# Patient Record
Sex: MALE | Race: WHITE | NOT HISPANIC OR LATINO
[De-identification: names, ages, dates, MRNs, and addresses within clinical notes are randomized per-mention and may not be internally consistent; named-entity substitution may affect disease eponyms.]

---

## 2017-07-24 PROBLEM — Z00.00 ENCOUNTER FOR PREVENTIVE HEALTH EXAMINATION: Noted: 2017-07-24

## 2017-08-08 ENCOUNTER — FORM ENCOUNTER (OUTPATIENT)
Age: 72
End: 2017-08-08

## 2017-08-09 ENCOUNTER — OUTPATIENT (OUTPATIENT)
Dept: OUTPATIENT SERVICES | Facility: HOSPITAL | Age: 72
LOS: 1 days | End: 2017-08-09
Payer: MEDICARE

## 2017-08-09 ENCOUNTER — APPOINTMENT (OUTPATIENT)
Dept: SPINE | Facility: CLINIC | Age: 72
End: 2017-08-09
Payer: MEDICARE

## 2017-08-09 VITALS
OXYGEN SATURATION: 94 % | WEIGHT: 270 LBS | DIASTOLIC BLOOD PRESSURE: 83 MMHG | HEART RATE: 85 BPM | HEIGHT: 74 IN | SYSTOLIC BLOOD PRESSURE: 150 MMHG | BODY MASS INDEX: 34.65 KG/M2

## 2017-08-09 DIAGNOSIS — Z78.9 OTHER SPECIFIED HEALTH STATUS: ICD-10-CM

## 2017-08-09 DIAGNOSIS — Z96.60 PRESENCE OF UNSPECIFIED ORTHOPEDIC JOINT IMPLANT: Chronic | ICD-10-CM

## 2017-08-09 DIAGNOSIS — Z96.653 PRESENCE OF ARTIFICIAL KNEE JOINT, BILATERAL: Chronic | ICD-10-CM

## 2017-08-09 PROCEDURE — 99204 OFFICE O/P NEW MOD 45 MIN: CPT

## 2017-08-09 PROCEDURE — 72081 X-RAY EXAM ENTIRE SPI 1 VW: CPT

## 2017-08-09 PROCEDURE — 72110 X-RAY EXAM L-2 SPINE 4/>VWS: CPT

## 2017-08-09 PROCEDURE — 72084 X-RAY EXAM ENTIRE SPI 6/> VW: CPT | Mod: 26

## 2017-08-11 PROBLEM — Z78.9 SOCIAL ALCOHOL USE: Status: ACTIVE | Noted: 2017-08-09

## 2017-08-11 PROBLEM — Z78.9 DOES NOT USE ILLICIT DRUGS: Status: ACTIVE | Noted: 2017-08-09

## 2017-09-14 ENCOUNTER — INPATIENT (INPATIENT)
Facility: HOSPITAL | Age: 72
LOS: 5 days | Discharge: NURSING, REHAB OR LONG-TERM CARE | DRG: 518 | End: 2017-09-20
Attending: NEUROLOGICAL SURGERY | Admitting: NEUROLOGICAL SURGERY
Payer: MEDICARE

## 2017-09-14 ENCOUNTER — APPOINTMENT (OUTPATIENT)
Dept: SPINE | Facility: HOSPITAL | Age: 72
End: 2017-09-14
Payer: MEDICARE

## 2017-09-14 VITALS
SYSTOLIC BLOOD PRESSURE: 145 MMHG | DIASTOLIC BLOOD PRESSURE: 79 MMHG | RESPIRATION RATE: 18 BRPM | WEIGHT: 281.09 LBS | TEMPERATURE: 97 F | OXYGEN SATURATION: 96 % | HEIGHT: 74 IN | HEART RATE: 79 BPM

## 2017-09-14 DIAGNOSIS — Z96.653 PRESENCE OF ARTIFICIAL KNEE JOINT, BILATERAL: Chronic | ICD-10-CM

## 2017-09-14 DIAGNOSIS — Z96.60 PRESENCE OF UNSPECIFIED ORTHOPEDIC JOINT IMPLANT: Chronic | ICD-10-CM

## 2017-09-14 DIAGNOSIS — G93.41 METABOLIC ENCEPHALOPATHY: ICD-10-CM

## 2017-09-14 DIAGNOSIS — Z98.890 OTHER SPECIFIED POSTPROCEDURAL STATES: Chronic | ICD-10-CM

## 2017-09-14 LAB
ANION GAP SERPL CALC-SCNC: 17 MMOL/L — SIGNIFICANT CHANGE UP (ref 5–17)
BASOPHILS NFR BLD AUTO: 0.1 % — SIGNIFICANT CHANGE UP (ref 0–2)
BUN SERPL-MCNC: 29 MG/DL — HIGH (ref 7–23)
CALCIUM SERPL-MCNC: 9 MG/DL — SIGNIFICANT CHANGE UP (ref 8.4–10.5)
CHLORIDE SERPL-SCNC: 106 MMOL/L — SIGNIFICANT CHANGE UP (ref 96–108)
CO2 SERPL-SCNC: 19 MMOL/L — LOW (ref 22–31)
CREAT SERPL-MCNC: 1.3 MG/DL — SIGNIFICANT CHANGE UP (ref 0.5–1.3)
EOSINOPHIL NFR BLD AUTO: 2.1 % — SIGNIFICANT CHANGE UP (ref 0–6)
GLUCOSE SERPL-MCNC: 168 MG/DL — HIGH (ref 70–99)
HCT VFR BLD CALC: 39.4 % — SIGNIFICANT CHANGE UP (ref 39–50)
HGB BLD-MCNC: 13.3 G/DL — SIGNIFICANT CHANGE UP (ref 13–17)
LYMPHOCYTES # BLD AUTO: 12.7 % — LOW (ref 13–44)
MCHC RBC-ENTMCNC: 31.3 PG — SIGNIFICANT CHANGE UP (ref 27–34)
MCHC RBC-ENTMCNC: 33.8 G/DL — SIGNIFICANT CHANGE UP (ref 32–36)
MCV RBC AUTO: 92.7 FL — SIGNIFICANT CHANGE UP (ref 80–100)
MONOCYTES NFR BLD AUTO: 2.4 % — SIGNIFICANT CHANGE UP (ref 2–14)
NEUTROPHILS NFR BLD AUTO: 82.7 % — HIGH (ref 43–77)
PLATELET # BLD AUTO: 178 K/UL — SIGNIFICANT CHANGE UP (ref 150–400)
POTASSIUM SERPL-MCNC: 4.3 MMOL/L — SIGNIFICANT CHANGE UP (ref 3.5–5.3)
POTASSIUM SERPL-SCNC: 4.3 MMOL/L — SIGNIFICANT CHANGE UP (ref 3.5–5.3)
RBC # BLD: 4.25 M/UL — SIGNIFICANT CHANGE UP (ref 4.2–5.8)
RBC # FLD: 14.3 % — SIGNIFICANT CHANGE UP (ref 10.3–16.9)
SODIUM SERPL-SCNC: 142 MMOL/L — SIGNIFICANT CHANGE UP (ref 135–145)
WBC # BLD: 8.8 K/UL — SIGNIFICANT CHANGE UP (ref 3.8–10.5)
WBC # FLD AUTO: 8.8 K/UL — SIGNIFICANT CHANGE UP (ref 3.8–10.5)

## 2017-09-14 PROCEDURE — 63048 LAM FACETEC &FORAMOT EA ADDL: CPT

## 2017-09-14 PROCEDURE — ZZZZZ: CPT

## 2017-09-14 PROCEDURE — 63047 LAM FACETEC & FORAMOT LUMBAR: CPT

## 2017-09-14 RX ORDER — SENNA PLUS 8.6 MG/1
2 TABLET ORAL ONCE
Qty: 0 | Refills: 0 | Status: COMPLETED | OUTPATIENT
Start: 2017-09-14 | End: 2017-09-14

## 2017-09-14 RX ORDER — BUPIVACAINE 13.3 MG/ML
20 INJECTION, SUSPENSION, LIPOSOMAL INFILTRATION ONCE
Qty: 0 | Refills: 0 | Status: DISCONTINUED | OUTPATIENT
Start: 2017-09-14 | End: 2017-09-20

## 2017-09-14 RX ORDER — LOSARTAN POTASSIUM 100 MG/1
25 TABLET, FILM COATED ORAL DAILY
Qty: 0 | Refills: 0 | Status: DISCONTINUED | OUTPATIENT
Start: 2017-09-14 | End: 2017-09-20

## 2017-09-14 RX ORDER — DIAZEPAM 5 MG
5 TABLET ORAL ONCE
Qty: 0 | Refills: 0 | Status: DISCONTINUED | OUTPATIENT
Start: 2017-09-14 | End: 2017-09-14

## 2017-09-14 RX ORDER — HYDROMORPHONE HYDROCHLORIDE 2 MG/ML
2 INJECTION INTRAMUSCULAR; INTRAVENOUS; SUBCUTANEOUS ONCE
Qty: 0 | Refills: 0 | Status: DISCONTINUED | OUTPATIENT
Start: 2017-09-14 | End: 2017-09-14

## 2017-09-14 RX ORDER — HYDROMORPHONE HYDROCHLORIDE 2 MG/ML
0.5 INJECTION INTRAMUSCULAR; INTRAVENOUS; SUBCUTANEOUS
Qty: 0 | Refills: 0 | Status: DISCONTINUED | OUTPATIENT
Start: 2017-09-14 | End: 2017-09-17

## 2017-09-14 RX ORDER — FAMOTIDINE 10 MG/ML
20 INJECTION INTRAVENOUS ONCE
Qty: 0 | Refills: 0 | Status: DISCONTINUED | OUTPATIENT
Start: 2017-09-14 | End: 2017-09-20

## 2017-09-14 RX ORDER — CEFAZOLIN SODIUM 1 G
2000 VIAL (EA) INJECTION EVERY 8 HOURS
Qty: 0 | Refills: 0 | Status: COMPLETED | OUTPATIENT
Start: 2017-09-14 | End: 2017-09-14

## 2017-09-14 RX ORDER — ONDANSETRON 8 MG/1
4 TABLET, FILM COATED ORAL ONCE
Qty: 0 | Refills: 0 | Status: DISCONTINUED | OUTPATIENT
Start: 2017-09-14 | End: 2017-09-20

## 2017-09-14 RX ORDER — ENOXAPARIN SODIUM 100 MG/ML
40 INJECTION SUBCUTANEOUS AT BEDTIME
Qty: 0 | Refills: 0 | Status: DISCONTINUED | OUTPATIENT
Start: 2017-09-15 | End: 2017-09-20

## 2017-09-14 RX ORDER — OXYCODONE AND ACETAMINOPHEN 5; 325 MG/1; MG/1
2 TABLET ORAL EVERY 6 HOURS
Qty: 0 | Refills: 0 | Status: DISCONTINUED | OUTPATIENT
Start: 2017-09-14 | End: 2017-09-17

## 2017-09-14 RX ORDER — CEFAZOLIN SODIUM 1 G
2000 VIAL (EA) INJECTION ONCE
Qty: 0 | Refills: 0 | Status: DISCONTINUED | OUTPATIENT
Start: 2017-09-14 | End: 2017-09-14

## 2017-09-14 RX ORDER — DEXTROSE MONOHYDRATE, SODIUM CHLORIDE, AND POTASSIUM CHLORIDE 50; .745; 4.5 G/1000ML; G/1000ML; G/1000ML
1000 INJECTION, SOLUTION INTRAVENOUS
Qty: 0 | Refills: 0 | Status: DISCONTINUED | OUTPATIENT
Start: 2017-09-14 | End: 2017-09-17

## 2017-09-14 RX ORDER — ACETAMINOPHEN 500 MG
650 TABLET ORAL ONCE
Qty: 0 | Refills: 0 | Status: COMPLETED | OUTPATIENT
Start: 2017-09-14 | End: 2017-09-16

## 2017-09-14 RX ORDER — CYCLOBENZAPRINE HYDROCHLORIDE 10 MG/1
5 TABLET, FILM COATED ORAL THREE TIMES A DAY
Qty: 0 | Refills: 0 | Status: DISCONTINUED | OUTPATIENT
Start: 2017-09-14 | End: 2017-09-15

## 2017-09-14 RX ORDER — ACETAMINOPHEN 500 MG
650 TABLET ORAL ONCE
Qty: 0 | Refills: 0 | Status: DISCONTINUED | OUTPATIENT
Start: 2017-09-14 | End: 2017-09-17

## 2017-09-14 RX ORDER — DOCUSATE SODIUM 100 MG
100 CAPSULE ORAL ONCE
Qty: 0 | Refills: 0 | Status: COMPLETED | OUTPATIENT
Start: 2017-09-14 | End: 2017-09-14

## 2017-09-14 RX ORDER — OXYCODONE AND ACETAMINOPHEN 5; 325 MG/1; MG/1
1 TABLET ORAL ONCE
Qty: 0 | Refills: 0 | Status: DISCONTINUED | OUTPATIENT
Start: 2017-09-14 | End: 2017-09-14

## 2017-09-14 RX ORDER — OXYCODONE AND ACETAMINOPHEN 5; 325 MG/1; MG/1
1 TABLET ORAL EVERY 4 HOURS
Qty: 0 | Refills: 0 | Status: DISCONTINUED | OUTPATIENT
Start: 2017-09-14 | End: 2017-09-17

## 2017-09-14 RX ORDER — ATORVASTATIN CALCIUM 80 MG/1
20 TABLET, FILM COATED ORAL AT BEDTIME
Qty: 0 | Refills: 0 | Status: DISCONTINUED | OUTPATIENT
Start: 2017-09-14 | End: 2017-09-20

## 2017-09-14 RX ORDER — ACETAMINOPHEN 500 MG
1000 TABLET ORAL ONCE
Qty: 0 | Refills: 0 | Status: COMPLETED | OUTPATIENT
Start: 2017-09-14 | End: 2017-09-17

## 2017-09-14 RX ADMIN — DEXTROSE MONOHYDRATE, SODIUM CHLORIDE, AND POTASSIUM CHLORIDE 85 MILLILITER(S): 50; .745; 4.5 INJECTION, SOLUTION INTRAVENOUS at 15:42

## 2017-09-14 RX ADMIN — HYDROMORPHONE HYDROCHLORIDE 0.5 MILLIGRAM(S): 2 INJECTION INTRAMUSCULAR; INTRAVENOUS; SUBCUTANEOUS at 15:01

## 2017-09-14 RX ADMIN — HYDROMORPHONE HYDROCHLORIDE 2 MILLIGRAM(S): 2 INJECTION INTRAMUSCULAR; INTRAVENOUS; SUBCUTANEOUS at 11:20

## 2017-09-14 RX ADMIN — Medication 100 MILLIGRAM(S): at 16:00

## 2017-09-14 RX ADMIN — Medication 100 MILLIGRAM(S): at 15:41

## 2017-09-14 RX ADMIN — HYDROMORPHONE HYDROCHLORIDE 0.5 MILLIGRAM(S): 2 INJECTION INTRAMUSCULAR; INTRAVENOUS; SUBCUTANEOUS at 15:16

## 2017-09-14 RX ADMIN — Medication 100 MILLIGRAM(S): at 23:38

## 2017-09-14 RX ADMIN — SENNA PLUS 2 TABLET(S): 8.6 TABLET ORAL at 15:41

## 2017-09-14 RX ADMIN — Medication 5 MILLIGRAM(S): at 11:37

## 2017-09-14 RX ADMIN — ATORVASTATIN CALCIUM 20 MILLIGRAM(S): 80 TABLET, FILM COATED ORAL at 21:23

## 2017-09-14 NOTE — H&P ADULT - ASSESSMENT
71y/o M with LBP, here for elective L4-5 laminectomy    PLAN  -medically clearances in chart  -consent in chart  -OR today for L4-5 laminectomy  -2 units PRBCs on hold  -type and screen  -post op PACU  -D/w Dr. Young

## 2017-09-14 NOTE — PACU DISCHARGE NOTE - COMMENTS
Report given to Saba VO.  VSS. Pt. in NAD at [facility]. Denies pain at present. IV site intact. transported to room on bed with o2 at 4l nc

## 2017-09-14 NOTE — CONSULT NOTE ADULT - SUBJECTIVE AND OBJECTIVE BOX
Pre-surgical Pain Inventory  HPI:  Obtained from chart. Pt presents for "pain primarily in the back. He is quite miserable because of it. The pain is severe and unremitting. He has had epidural steroids injections. He cannot walk. He has to stoop forward and rest. He has a cane. He is a  and it is hard for him to participate in his work responsibilities." Patient has failed conservative management and is here for elective L4-5 laminectomy. Pt denies fevers, chills, SOB, CP, urinary/bowel incontinence, saddle anesthesia, acute loss of sensation/weakness of extremities. (14 Sep 2017 11:44)      Surgery: L45 Laminectomy    Surgeon: Dr. Young    PAST MEDICAL & SURGICAL HISTORY:  Hypercholesterolemia  Hypertension  History of hernia repair  S/P hip replacement: right hip  S/P knee replacement, bilateral      Current Pre-op Pain Medications  Denies current use, states that he has had severe EPSI, without     Prescribed by:    Past Pain Medication:   [] Oxycodone  [] Hydrocodone  [] Methadone  [] Fentanyl  [] Dilaudid  [] Morphine  [] Tramadol  [] NSAIDs/Anti-inflammatories  [] Medrol/Decadron  [] Neuropathic Agents  [] Muscle Relaxants    [] Anxiolytics  [] Anti-depressants     Allergies    No Known Allergies    Intolerances        Vital Signs:  Vital Signs Last 24 Hrs  T(C): 36.2 (14 Sep 2017 10:39), Max: 36.3 (14 Sep 2017 07:06)  T(F): 97.1 (14 Sep 2017 10:39), Max: 97.3 (14 Sep 2017 07:06)  HR: 96 (14 Sep 2017 11:45) (79 - 96)  BP: 162/86 (14 Sep 2017 11:45) (133/75 - 163/83)  BP(mean): 98 (14 Sep 2017 11:15) (97 - 101)  RR: 24 (14 Sep 2017 11:45) (10 - 24)  SpO2: 95% (14 Sep 2017 11:45) (95% - 99%)    Labs:       REVIEW OF SYSTEMS:    CONSTITUTIONAL: No fever, weight loss, or fatigue  EYES: No eye pain, visual disturbances, or discharge  ENMT:  No difficulty hearing, tinnitus, vertigo; No sinus or throat pain  NECK: No pain or stiffness  BREASTS: No pain, masses, or nipple discharge  RESPIRATORY: No cough, wheezing, chills or hemoptysis; No shortness of breath  CARDIOVASCULAR: No chest pain, palpitations, dizziness, or leg swelling  GASTROINTESTINAL: No abdominal or epigastric pain. No nausea, vomiting, or hematemesis; No diarrhea or constipation. No melena or hematochezia.  GENITOURINARY: No dysuria, frequency, hematuria, or incontinence  NEUROLOGICAL: No headaches, memory loss, loss of strength, numbness, or tremors  SKIN: No itching, burning, rashes, or lesions   LYMPH NODES: No enlarged glands  ENDOCRINE: No heat or cold intolerance; No hair loss  MUSCULOSKELETAL: No joint pain or swelling; No muscle, back, or extremity pain  PSYCHIATRIC: No depression, anxiety, mood swings, or difficulty sleeping  HEME/LYMPH: No easy bruising, or bleeding gums  ALLERY AND IMMUNOLOGIC: No hives or eczema      FUNCTIONAL ASSESSMENT:  AVERAGE DAILY PAIN SCORE:        PAIN ASSESSMENT:    PHYSICAL EXAM  GENERAL: NAD   HEAD:  Atraumatic, Normocephalic  EYES: EOMI, PERRLA, conjunctiva and sclera clear  NECK: Supple, ___ collar  NERVOUS SYSTEM:    Alert & Oriented X3, Good concentration;   Cranial nerves grossly intact  Motor Strength 5/5 B/L upper and lower extremities;   Sensation intact to LT in UE/LE in 3 dermatomes    Cervical: No facet tenderness. Negative Spurlings sign. Negative Brasher's sign. Negative Brudzinski's sign. Negative Kernig's sign. Cervical ROM not assessed, s/p surgery and restricted turning.  ROM  Cervical flexion: 50  Right lateral flexion: 45  Left lateral flexion: 45  Extension: 60  Left rotation: 80  Right rotation: 80    Lumbar: No lumbar tenderness. Negative straight leg raise. Negative crossed straight leg raise. Negative Darrell's sign. Negative facet loading maneuvers. Lumbar ROM not assessed, s/p surgery and restricted turning.  ROM  Trunk extension: 25  Trunk flexion: 90  Right rotation: 25  Left rotation: 25    CHEST/LUNG: Clear to auscultation bilaterally; No rales, rhonchi, wheezing, or rubs  HEART: Regular rate and rhythm; No murmurs, rubs, or gallops  ABDOMEN: Soft, Nontender, Nondistended; Bowel sounds present  EXTREMITIES:  2+ Peripheral Pulses, No clubbing, cyanosis, or edema  SKIN: No rashes or lesions    Assessment:   [] yo [M/F] with c/o [] plan for []    Plan:   Immediate post-op plan  	PCA Settings:  	Opioid:  Initial Bolus:  	Initial Demand:  	Lockout:  	Continuous Rate:  	4 hour limit:	  Rescue plan  Tentative floor plan Pre-surgical Pain Inventory  HPI:  Obtained from chart. Pt presents for "pain primarily in the back. He is quite miserable because of it. The pain is severe and unremitting. He has had epidural steroids injections. He cannot walk. He has to stoop forward and rest. He has a cane. He is a  and it is hard for him to participate in his work responsibilities." Patient has failed conservative management and is here for elective L4-5 laminectomy. Pt denies fevers, chills, SOB, CP, urinary/bowel incontinence, saddle anesthesia, acute loss of sensation/weakness of extremities. (14 Sep 2017 11:44)      Surgery: L45 Laminectomy    Surgeon: Dr. Young    PAST MEDICAL & SURGICAL HISTORY:  Hypercholesterolemia  Hypertension  History of hernia repair  S/P hip replacement: right hip  S/P knee replacement, bilateral      Current Pre-op Pain Medications  Denies current use, states that he has had severe EPSI, without relief.     Past Pain Medication:   [X] Oxycodone  [] Hydrocodone  [] Methadone  [] Fentanyl  [X] Dilaudid  [] Morphine  [] Tramadol  [X] NSAIDs/Anti-inflammatories  - Ibuprofen/Tylenol  [X] Medrol/Decadron  [] Neuropathic Agents  [] Muscle Relaxants  [] Anxiolytics  [] Anti-depressants     Allergies    No Known Allergies    Intolerances        Vital Signs:  Vital Signs Last 24 Hrs  T(C): 36.2 (14 Sep 2017 10:39), Max: 36.3 (14 Sep 2017 07:06)  T(F): 97.1 (14 Sep 2017 10:39), Max: 97.3 (14 Sep 2017 07:06)  HR: 96 (14 Sep 2017 11:45) (79 - 96)  BP: 162/86 (14 Sep 2017 11:45) (133/75 - 163/83)  BP(mean): 98 (14 Sep 2017 11:15) (97 - 101)  RR: 24 (14 Sep 2017 11:45) (10 - 24)  SpO2: 95% (14 Sep 2017 11:45) (95% - 99%)    Assessment:   71yo M w/ c/o back pain and plan for L45 lami    Plan:   Immediate post-op plan  	PCA Settings:  	Opioid:  Initial Bolus:  	Initial Demand:  	Lockout:  	Continuous Rate:  	4 hour limit:	  Rescue plan  Tentative floor plan Pre-surgical Pain Inventory  HPI:  Obtained from chart. Pt presents for "pain primarily in the back. He is quite miserable because of it. The pain is severe and unremitting. He has had epidural steroids injections. He cannot walk. He has to stoop forward and rest. He has a cane. He is a  and it is hard for him to participate in his work responsibilities." Patient has failed conservative management and is here for elective L4-5 laminectomy. Pt denies fevers, chills, SOB, CP, urinary/bowel incontinence, saddle anesthesia, acute loss of sensation/weakness of extremities. (14 Sep 2017 11:44)      Surgery: L45 Laminectomy    Surgeon: Dr. Young    PAST MEDICAL & SURGICAL HISTORY:  Hypercholesterolemia  Hypertension  History of hernia repair  S/P hip replacement: right hip  S/P knee replacement, bilateral      Current Pre-op Pain Medications  Denies current use, states that he has had severe EPSI, without relief.     Past Pain Medication:   [X] Oxycodone  [] Hydrocodone  [] Methadone  [] Fentanyl  [X] Dilaudid  [] Morphine  [] Tramadol  [X] NSAIDs/Anti-inflammatories  - Ibuprofen/Tylenol  [X] Medrol/Decadron  [] Neuropathic Agents  [] Muscle Relaxants  [] Anxiolytics  [] Anti-depressants     Allergies    No Known Allergies    Intolerances        Vital Signs:  Vital Signs Last 24 Hrs  T(C): 36.2 (14 Sep 2017 10:39), Max: 36.3 (14 Sep 2017 07:06)  T(F): 97.1 (14 Sep 2017 10:39), Max: 97.3 (14 Sep 2017 07:06)  HR: 96 (14 Sep 2017 11:45) (79 - 96)  BP: 162/86 (14 Sep 2017 11:45) (133/75 - 163/83)  BP(mean): 98 (14 Sep 2017 11:15) (97 - 101)  RR: 24 (14 Sep 2017 11:45) (10 - 24)  SpO2: 95% (14 Sep 2017 11:45) (95% - 99%)    Assessment:   71yo M w/ c/o back pain and plan for L45 lami    Plan:   Immediate post-op plan  - Percocet 10mg q6h PRN for Severe Pain   - Percocet 5mg q4h PRN for Mod Pain  - Flexeril 5mg TID PRN for Spasms  - IV Tylenol PRN 1st line for breakthrough pain  - Dilaudid 0.5mg q2h, PRN 2nd line for breakthrough pain     Rescue plan  - Ofirmev, Dilaudid    Tentative floor plan  - Percocet, Flexeril

## 2017-09-14 NOTE — H&P ADULT - NSHPPHYSICALEXAM_GEN_ALL_CORE
Neurological: AAOx3, FC, speech coherent  CNII-XII: EOM intact, PERRL  Motor: MAEx4 5/5 UE and LE B/L  SILT throughout

## 2017-09-14 NOTE — H&P ADULT - HISTORY OF PRESENT ILLNESS
Obtained from chart. Pt presents for "pain primarily in the back. He is quite miserable because of it. The pain is severe and unremitting. He has had epidural steroids injections. He cannot walk. He has to stoop forward and rest. He has a cane. He is a  and it is hard for him to participate in his work responsibilities." Patient has failed conservative management and is here for elective L4-5 laminectomy. Pt denies fevers, chills, SOB, CP, urinary/bowel incontinence, saddle anesthesia, acute loss of sensation/weakness of extremities.

## 2017-09-14 NOTE — BRIEF OPERATIVE NOTE - PROCEDURE
<<-----Click on this checkbox to enter Procedure Lumbar spine surgery  09/14/2017    Active  LATRICIA

## 2017-09-14 NOTE — PROGRESS NOTE ADULT - SUBJECTIVE AND OBJECTIVE BOX
NEUROSURGERY POST OP NOTE:    POD# 0 S/P L4-5 laminectomy    S: Pt seen and examined at bedside, resting comfortably. Pt states mild incision site pain. Pt denies chills, CP, SOB, acute weakness/loss of sensation of extremities.    T(C): 36.2 (09-14-17 @ 10:39), Max: 36.3 (09-14-17 @ 07:06)  HR: 92 (09-14-17 @ 10:54) (79 - 94)  BP: 150/75 (09-14-17 @ 10:54) (133/75 - 159/86)  RR: 15 (09-14-17 @ 10:54) (12 - 18)  SpO2: 97% (09-14-17 @ 10:54) (96% - 99%)    BUpivacaine liposome 1.3% Injectable (no eMAR) 20 milliLiter(s) Local Injection Once  sodium chloride 0.9% with potassium chloride 20 mEq/L 1000 milliLiter(s) IV Continuous <Continuous>  oxyCODONE    5 mG/acetaminophen 325 mG 1 Tablet(s) Oral Once PRN  ceFAZolin   IVPB 2000 milliGRAM(s) IV Intermittent Once  losartan 25 milliGRAM(s) Oral daily  atorvastatin 20 milliGRAM(s) Oral at bedtime    Exam:  Neurological: AAOx3, FC, speech coherent  CNII-XII: EOM intact, PERRL  Motor: MAEx4 5/5 UE and LE B/L  SILT throughout  Back incision site C/D/I, dressing in place    WOUND/DRAINS: Hemovac x1, draining serosanguinous fluid     Assessment: 72yMale s/p L4-5 laminectomy    Plan:  -pain management  -monitor hemovac output  -DVT prophylaxis: SCDs for now  -IVF hydration, advance diet as tolerated  -Encourage incentive spirometer]  -Remove tavarez in AM  -F/u postop labs  -PT/OT/OOB  -D/w Dr. Young

## 2017-09-15 LAB
ANION GAP SERPL CALC-SCNC: 13 MMOL/L — SIGNIFICANT CHANGE UP (ref 5–17)
BUN SERPL-MCNC: 18 MG/DL — SIGNIFICANT CHANGE UP (ref 7–23)
CALCIUM SERPL-MCNC: 8.7 MG/DL — SIGNIFICANT CHANGE UP (ref 8.4–10.5)
CHLORIDE SERPL-SCNC: 104 MMOL/L — SIGNIFICANT CHANGE UP (ref 96–108)
CO2 SERPL-SCNC: 22 MMOL/L — SIGNIFICANT CHANGE UP (ref 22–31)
CREAT SERPL-MCNC: 0.97 MG/DL — SIGNIFICANT CHANGE UP (ref 0.5–1.3)
GLUCOSE SERPL-MCNC: 126 MG/DL — HIGH (ref 70–99)
HCT VFR BLD CALC: 36.7 % — LOW (ref 39–50)
HGB BLD-MCNC: 12.1 G/DL — LOW (ref 13–17)
MAGNESIUM SERPL-MCNC: 1.8 MG/DL — SIGNIFICANT CHANGE UP (ref 1.6–2.6)
MCHC RBC-ENTMCNC: 30.6 PG — SIGNIFICANT CHANGE UP (ref 27–34)
MCHC RBC-ENTMCNC: 33 G/DL — SIGNIFICANT CHANGE UP (ref 32–36)
MCV RBC AUTO: 92.7 FL — SIGNIFICANT CHANGE UP (ref 80–100)
PHOSPHATE SERPL-MCNC: 3.7 MG/DL — SIGNIFICANT CHANGE UP (ref 2.5–4.5)
PLATELET # BLD AUTO: 186 K/UL — SIGNIFICANT CHANGE UP (ref 150–400)
POTASSIUM SERPL-MCNC: 4.4 MMOL/L — SIGNIFICANT CHANGE UP (ref 3.5–5.3)
POTASSIUM SERPL-SCNC: 4.4 MMOL/L — SIGNIFICANT CHANGE UP (ref 3.5–5.3)
RBC # BLD: 3.96 M/UL — LOW (ref 4.2–5.8)
RBC # FLD: 14 % — SIGNIFICANT CHANGE UP (ref 10.3–16.9)
SODIUM SERPL-SCNC: 139 MMOL/L — SIGNIFICANT CHANGE UP (ref 135–145)
WBC # BLD: 13.1 K/UL — HIGH (ref 3.8–10.5)
WBC # FLD AUTO: 13.1 K/UL — HIGH (ref 3.8–10.5)

## 2017-09-15 RX ORDER — DOCUSATE SODIUM 100 MG
100 CAPSULE ORAL DAILY
Qty: 0 | Refills: 0 | Status: DISCONTINUED | OUTPATIENT
Start: 2017-09-15 | End: 2017-09-20

## 2017-09-15 RX ORDER — CYCLOBENZAPRINE HYDROCHLORIDE 10 MG/1
5 TABLET, FILM COATED ORAL THREE TIMES A DAY
Qty: 0 | Refills: 0 | Status: DISCONTINUED | OUTPATIENT
Start: 2017-09-15 | End: 2017-09-15

## 2017-09-15 RX ORDER — POLYETHYLENE GLYCOL 3350 17 G/17G
17 POWDER, FOR SOLUTION ORAL EVERY 12 HOURS
Qty: 0 | Refills: 0 | Status: DISCONTINUED | OUTPATIENT
Start: 2017-09-15 | End: 2017-09-20

## 2017-09-15 RX ORDER — CYCLOBENZAPRINE HYDROCHLORIDE 10 MG/1
10 TABLET, FILM COATED ORAL THREE TIMES A DAY
Qty: 0 | Refills: 0 | Status: DISCONTINUED | OUTPATIENT
Start: 2017-09-15 | End: 2017-09-18

## 2017-09-15 RX ORDER — SENNA PLUS 8.6 MG/1
2 TABLET ORAL ONCE
Qty: 0 | Refills: 0 | Status: COMPLETED | OUTPATIENT
Start: 2017-09-15 | End: 2017-09-15

## 2017-09-15 RX ADMIN — ATORVASTATIN CALCIUM 20 MILLIGRAM(S): 80 TABLET, FILM COATED ORAL at 21:33

## 2017-09-15 RX ADMIN — OXYCODONE AND ACETAMINOPHEN 2 TABLET(S): 5; 325 TABLET ORAL at 10:35

## 2017-09-15 RX ADMIN — OXYCODONE AND ACETAMINOPHEN 1 TABLET(S): 5; 325 TABLET ORAL at 04:47

## 2017-09-15 RX ADMIN — OXYCODONE AND ACETAMINOPHEN 2 TABLET(S): 5; 325 TABLET ORAL at 16:28

## 2017-09-15 RX ADMIN — LOSARTAN POTASSIUM 25 MILLIGRAM(S): 100 TABLET, FILM COATED ORAL at 04:47

## 2017-09-15 RX ADMIN — CYCLOBENZAPRINE HYDROCHLORIDE 10 MILLIGRAM(S): 10 TABLET, FILM COATED ORAL at 13:48

## 2017-09-15 RX ADMIN — ENOXAPARIN SODIUM 40 MILLIGRAM(S): 100 INJECTION SUBCUTANEOUS at 21:33

## 2017-09-15 RX ADMIN — Medication 100 MILLIGRAM(S): at 11:55

## 2017-09-15 RX ADMIN — OXYCODONE AND ACETAMINOPHEN 1 TABLET(S): 5; 325 TABLET ORAL at 05:31

## 2017-09-15 RX ADMIN — CYCLOBENZAPRINE HYDROCHLORIDE 10 MILLIGRAM(S): 10 TABLET, FILM COATED ORAL at 21:33

## 2017-09-15 RX ADMIN — OXYCODONE AND ACETAMINOPHEN 2 TABLET(S): 5; 325 TABLET ORAL at 09:35

## 2017-09-15 RX ADMIN — SENNA PLUS 2 TABLET(S): 8.6 TABLET ORAL at 11:55

## 2017-09-15 RX ADMIN — OXYCODONE AND ACETAMINOPHEN 2 TABLET(S): 5; 325 TABLET ORAL at 17:28

## 2017-09-15 NOTE — PHYSICAL THERAPY INITIAL EVALUATION ADULT - PERTINENT HX OF CURRENT PROBLEM, REHAB EVAL
Patient is a 73 y/o M with chief c/o chronic low back pain impacting functional mobility presenting for elective  L4-5 laminectomy.

## 2017-09-15 NOTE — PHYSICAL THERAPY INITIAL EVALUATION ADULT - ADDITIONAL COMMENTS
Patient reports living in home with no steps to enter. There is one flight inside to get to floor with bedroom however, patient has a chair lift (spouse with disability) that can be used. Patient reports independence prior to admission in ambulation/ADL's with occasional use of SC if necessary.

## 2017-09-15 NOTE — PHYSICAL THERAPY INITIAL EVALUATION ADULT - GENERAL OBSERVATIONS, REHAB EVAL
Patient encountered seated at EOB, NAD, CNA present, +JPx1, surgical site to lumbar spine C/D/I, +TAVO coker Rosalina cleared patient to ambulate.

## 2017-09-15 NOTE — PHYSICAL THERAPY INITIAL EVALUATION ADULT - RANGE OF MOTION EXAMINATION, REHAB EVAL
deficits as listed below/no ROM deficits were identified/trunk AROM limited secondary to spinal precautions

## 2017-09-15 NOTE — PROGRESS NOTE ADULT - SUBJECTIVE AND OBJECTIVE BOX
HPI:  Obtained from chart. Pt presents for "pain primarily in the back. He is quite miserable because of it. The pain is severe and unremitting. He has had epidural steroids injections. He cannot walk. He has to stoop forward and rest. He has a cane. He is a  and it is hard for him to participate in his work responsibilities." Patient has failed conservative management and is here for elective L4-5 laminectomy. Pt denies fevers, chills, SOB, CP, urinary/bowel incontinence, saddle anesthesia, acute loss of sensation/weakness of extremities. (14 Sep 2017 11:44)    OVERNIGHT EVENTS:  Vital Signs Last 24 Hrs  T(C): 36.5 (15 Sep 2017 04:43), Max: 36.6 (14 Sep 2017 20:59)  T(F): 97.7 (15 Sep 2017 04:43), Max: 97.9 (14 Sep 2017 20:59)  HR: 83 (15 Sep 2017 04:43) (83 - 97)  BP: 150/76 (15 Sep 2017 04:43) (133/75 - 163/83)  BP(mean): 98 (14 Sep 2017 11:15) (97 - 101)  RR: 16 (15 Sep 2017 04:43) (10 - 24)  SpO2: 97% (15 Sep 2017 04:43) (95% - 99%)    I&O's Summary    14 Sep 2017 07:01  -  15 Sep 2017 07:00  --------------------------------------------------------  IN: 1750 mL / OUT: 2835 mL / NET: -1085 mL        PHYSICAL EXAM:  Neurological: A&OX3 Cranial nerves intact  LACY 5/5  Lami dressingCDI with hemovac to self suction moderate amounts of bloody draiange      Cardiovascular:RRR  Respiratory:Lungs CTAB  Gastrointestinal:+BS  Genitourinary:Voiding without difficulty  Extremities: warm and dry        DIET:Regular      LABS:                        12.1   13.1  )-----------( 186      ( 15 Sep 2017 07:12 )             36.7     09-15    139  |  104  |  18  ----------------------------<  126<H>  4.4   |  22  |  0.97    Ca    8.7      15 Sep 2017 07:12  Phos  3.7     09-15  Mg     1.8     09-15        Allergies    No Known Allergies    Intolerances      MEDICATIONS:  Antibiotics:    Neuro:  acetaminophen   Tablet 650 milliGRAM(s) Oral Once PRN  acetaminophen   Tablet. 650 milliGRAM(s) Oral Once PRN  ondansetron Injectable 4 milliGRAM(s) IV Push Once PRN  oxyCODONE    5 mG/acetaminophen 325 mG 1 Tablet(s) Oral every 4 hours PRN  oxyCODONE    5 mG/acetaminophen 325 mG 2 Tablet(s) Oral every 6 hours PRN  HYDROmorphone  Injectable 0.5 milliGRAM(s) IV Push every 2 hours PRN  cyclobenzaprine 5 milliGRAM(s) Oral three times a day PRN  acetaminophen  IVPB. 1000 milliGRAM(s) IV Intermittent once PRN    Anticoagulation:  enoxaparin Injectable 40 milliGRAM(s) SubCutaneous at bedtime    OTHER:  BUpivacaine liposome 1.3% Injectable (no eMAR) 20 milliLiter(s) Local Injection Once  famotidine    Tablet 20 milliGRAM(s) Oral Once PRN  losartan 25 milliGRAM(s) Oral daily  atorvastatin 20 milliGRAM(s) Oral at bedtime    IVF:  sodium chloride 0.9% with potassium chloride 20 mEq/L 1000 milliLiter(s) IV Continuous <Continuous>        ASSESSMENT:  72y Male s/p POD#1 L4-5 lami with hemovac    PLAN:  NEURO:  Monitor neuro status  OT/PT  DC Ivory/IVF  Advance diet  OOB  Pain managment  Bowel Regime    Dispo: Discuss with attending

## 2017-09-15 NOTE — PHYSICAL THERAPY INITIAL EVALUATION ADULT - CRITERIA FOR SKILLED THERAPEUTIC INTERVENTIONS
impairments found/risk reduction/prevention/rehab potential/anticipated equipment needs at discharge/anticipated discharge recommendation/functional limitations in following categories

## 2017-09-15 NOTE — PHYSICAL THERAPY INITIAL EVALUATION ADULT - REHAB POTENTIAL, PT EVAL
patient demonstrating functional independence and is cleared from skilled therapy at this time./none

## 2017-09-15 NOTE — PROGRESS NOTE ADULT - SUBJECTIVE AND OBJECTIVE BOX
NEUROSURGERY PAIN MANAGEMENT PROGRESS NOTE    O/N  No acute overnight events, pt c/o sig. stiffness in back    PLAN  Will schedule relaxant for now, Flexeril PO 10mg TID, will change to PRN later on in day.    HPI:  Obtained from chart. Pt presents for "pain primarily in the back. He is quite miserable because of it. The pain is severe and unremitting. He has had epidural steroids injections. He cannot walk. He has to stoop forward and rest. He has a cane. He is a  and it is hard for him to participate in his work responsibilities." Patient has failed conservative management and is here for elective L4-5 laminectomy. Pt denies fevers, chills, SOB, CP, urinary/bowel incontinence, saddle anesthesia, acute loss of sensation/weakness of extremities. (14 Sep 2017 11:44)      PAST MEDICAL & SURGICAL HISTORY:  Hypercholesterolemia  Hypertension  History of hernia repair  S/P hip replacement: right hip  S/P knee replacement, bilateral    Allergies    No Known Allergies    Intolerances        PAIN MEDICATIONS:  acetaminophen   Tablet 650 milliGRAM(s) Oral Once PRN  acetaminophen   Tablet. 650 milliGRAM(s) Oral Once PRN  ondansetron Injectable 4 milliGRAM(s) IV Push Once PRN  oxyCODONE    5 mG/acetaminophen 325 mG 1 Tablet(s) Oral every 4 hours PRN  oxyCODONE    5 mG/acetaminophen 325 mG 2 Tablet(s) Oral every 6 hours PRN  HYDROmorphone  Injectable 0.5 milliGRAM(s) IV Push every 2 hours PRN  acetaminophen  IVPB. 1000 milliGRAM(s) IV Intermittent once PRN  cyclobenzaprine 10 milliGRAM(s) Oral three times a day    Heme:  enoxaparin Injectable 40 milliGRAM(s) SubCutaneous at bedtime    Antibiotics:    Cardiovascular:  losartan 25 milliGRAM(s) Oral daily    GI:  famotidine    Tablet 20 milliGRAM(s) Oral Once PRN    Endocrine:  atorvastatin 20 milliGRAM(s) Oral at bedtime    All Other Medications:  BUpivacaine liposome 1.3% Injectable (no eMAR) 20 milliLiter(s) Local Injection Once  sodium chloride 0.9% with potassium chloride 20 mEq/L 1000 milliLiter(s) IV Continuous <Continuous>      Vital Signs Last 24 Hrs  T(C): 36.3 (15 Sep 2017 08:56), Max: 36.6 (14 Sep 2017 20:59)  T(F): 97.4 (15 Sep 2017 08:56), Max: 97.9 (14 Sep 2017 20:59)  HR: 81 (15 Sep 2017 08:56) (81 - 97)  BP: 162/90 (15 Sep 2017 08:56) (133/75 - 163/83)  BP(mean): 98 (14 Sep 2017 11:15) (97 - 101)  RR: 16 (15 Sep 2017 08:56) (10 - 24)  SpO2: 98% (15 Sep 2017 08:56) (95% - 99%)    LABS:                        12.1   13.1  )-----------( 186      ( 15 Sep 2017 07:12 )             36.7     09-15    139  |  104  |  18  ----------------------------<  126<H>  4.4   |  22  |  0.97    Ca    8.7      15 Sep 2017 07:12  Phos  3.7     09-15  Mg     1.8     09-15            RADIOLOGY:    Drug Screen:        REVIEW OF SYSTEMS:  CONSTITUTIONAL: No fever or fatigue O/N.   EYES: No eye pain, visual disturbances  ENMT:  No difficulty hearing, tinnitus. No sinus or throat pain  NECK: No pain or stiffness  RESPIRATORY: No cough, wheezing, chills or hemoptysis; No shortness of breath  CARDIOVASCULAR: No chest pain, palpitations.   GASTROINTESTINAL: Pt reports passing gas. No bowel movements since tuesday.  No abdominal or epigastric pain. No nausea, vomiting.   NEUROLOGICAL: No headaches, memory loss, loss of strength, numbness, or tremors.   MUSCULOSKELETAL: Sig muscle stiffness, mild-mod Incisional back pain. No joint pain or swelling    FUNCTIONAL ASSESSMENT:  PAIN SCORE AT REST:    0/10    SCALE USED: (1-10 VNRS)  PAIN SCORE WITH ACTIVITY:  2-3/10        SCALE USED: (1-10 VNRS)  Comment:Pt states that back pain is primarily related to stiffness/not related to incisional pain    PAIN ASSESSMENT:  Pt c/o intermittent, positional, (when pressure placed on incision) posterior back pain r/t incision. Mild in severity and only with movement, alleviated with rest. Pt reporting sig. associated stiffness. Denies radic, denies numbness/tingling. Plan for scheduled muscle relaxant.     PHYSICAL EXAM  GENERAL: NAD, well-groomed, well-developed  EYES: EOMI, PERRLA, conjunctiva and sclera clear  NERVOUS SYSTEM:    Alert & Oriented X3, Good concentration;   Cranial nerves grossly intact  Motor Strength 5/5 B/L upper and lower extremities;   Sensation intact to LT in UE/LE in 3 dermatomes  Lumbar: Incisional mild lumbar tenderness. - SLR, - XSLR. Lumbar ROM not assessed, s/p surgery and restricted turning.  CHEST/LUNG: Clear to auscultation bilaterally; No rales, rhonchi, wheezing, or rubs  HEART: Regular rate and rhythm; No murmurs, rubs, or gallops  ABDOMEN: Softly distended, hypoactive BS++    ASSESSMENT:   71yo M w/ c/o back pain s/p L45 lami POD 1    PLAN:   1. Opioids  Since yesterday postop, pt has required: 1x 2mg Dilaudid IVP, 1x 0.5mg Dilaudid IVP, 1x 5mg Percocet, 1x 10mg Percocet    PLAN: No IVP medications for now. Pt on Percocet dosing, states that it is providing him mod relief.     2. Neuropathics  Pt currently denies neuropathic pain. No numbness/tingling/electric shock like sensation in LE/UE b.l.    PLAN: No indication for neuropathic agents.     3. Adjuvants  Pt complaining of muscle stiffness in low back as primary source of pain. Tylenol 650mg q6h PRN for Mild Pain. Pt on Percocet.     PLAN: Resume Flexeril 10mg TID scheduled today. Dc. with PRNs.     4. Prophylactic:   Bowel regimen: Docusate Senna; ++ Miralax BID PRN   Nausea PRN: Zofran PRN for Nausea, pt does not c/o current    5. Functional Goals:   Pt will get OOB with PT today. Pt will resume previous level of activity without impairment from surgery.     6. Additional Consults:   None recommended.     7. Additional Labs/Imaging:   None recommended.     8. Follow up, Discharge Planning:   Patient is set for discharge to: PT/OT eval, dc drain  Discharge is pending: PT/OT eval, dc drain  Pain Management follow up plan: F/u outpt. inpatient

## 2017-09-16 RX ORDER — MAGNESIUM HYDROXIDE 400 MG/1
30 TABLET, CHEWABLE ORAL DAILY
Qty: 0 | Refills: 0 | Status: DISCONTINUED | OUTPATIENT
Start: 2017-09-16 | End: 2017-09-20

## 2017-09-16 RX ADMIN — ENOXAPARIN SODIUM 40 MILLIGRAM(S): 100 INJECTION SUBCUTANEOUS at 21:36

## 2017-09-16 RX ADMIN — Medication 100 MILLIGRAM(S): at 12:00

## 2017-09-16 RX ADMIN — Medication 650 MILLIGRAM(S): at 14:19

## 2017-09-16 RX ADMIN — CYCLOBENZAPRINE HYDROCHLORIDE 10 MILLIGRAM(S): 10 TABLET, FILM COATED ORAL at 05:10

## 2017-09-16 RX ADMIN — CYCLOBENZAPRINE HYDROCHLORIDE 10 MILLIGRAM(S): 10 TABLET, FILM COATED ORAL at 15:47

## 2017-09-16 RX ADMIN — ATORVASTATIN CALCIUM 20 MILLIGRAM(S): 80 TABLET, FILM COATED ORAL at 21:36

## 2017-09-16 RX ADMIN — OXYCODONE AND ACETAMINOPHEN 2 TABLET(S): 5; 325 TABLET ORAL at 03:30

## 2017-09-16 RX ADMIN — OXYCODONE AND ACETAMINOPHEN 2 TABLET(S): 5; 325 TABLET ORAL at 02:41

## 2017-09-16 RX ADMIN — OXYCODONE AND ACETAMINOPHEN 2 TABLET(S): 5; 325 TABLET ORAL at 19:31

## 2017-09-16 RX ADMIN — POLYETHYLENE GLYCOL 3350 17 GRAM(S): 17 POWDER, FOR SOLUTION ORAL at 21:36

## 2017-09-16 RX ADMIN — OXYCODONE AND ACETAMINOPHEN 2 TABLET(S): 5; 325 TABLET ORAL at 13:00

## 2017-09-16 RX ADMIN — OXYCODONE AND ACETAMINOPHEN 2 TABLET(S): 5; 325 TABLET ORAL at 12:00

## 2017-09-16 RX ADMIN — CYCLOBENZAPRINE HYDROCHLORIDE 10 MILLIGRAM(S): 10 TABLET, FILM COATED ORAL at 21:36

## 2017-09-16 RX ADMIN — LOSARTAN POTASSIUM 25 MILLIGRAM(S): 100 TABLET, FILM COATED ORAL at 05:10

## 2017-09-17 LAB
ALBUMIN SERPL ELPH-MCNC: 3.5 G/DL — SIGNIFICANT CHANGE UP (ref 3.3–5)
ALP SERPL-CCNC: 47 U/L — SIGNIFICANT CHANGE UP (ref 40–120)
ALT FLD-CCNC: 11 U/L — SIGNIFICANT CHANGE UP (ref 10–45)
ANION GAP SERPL CALC-SCNC: 14 MMOL/L — SIGNIFICANT CHANGE UP (ref 5–17)
APPEARANCE UR: CLEAR — SIGNIFICANT CHANGE UP
AST SERPL-CCNC: 13 U/L — SIGNIFICANT CHANGE UP (ref 10–40)
BILIRUB SERPL-MCNC: 0.9 MG/DL — SIGNIFICANT CHANGE UP (ref 0.2–1.2)
BILIRUB UR-MCNC: NEGATIVE — SIGNIFICANT CHANGE UP
BUN SERPL-MCNC: 15 MG/DL — SIGNIFICANT CHANGE UP (ref 7–23)
CALCIUM SERPL-MCNC: 8.8 MG/DL — SIGNIFICANT CHANGE UP (ref 8.4–10.5)
CHLORIDE SERPL-SCNC: 97 MMOL/L — SIGNIFICANT CHANGE UP (ref 96–108)
CO2 SERPL-SCNC: 25 MMOL/L — SIGNIFICANT CHANGE UP (ref 22–31)
COLOR SPEC: YELLOW — SIGNIFICANT CHANGE UP
CREAT SERPL-MCNC: 1.04 MG/DL — SIGNIFICANT CHANGE UP (ref 0.5–1.3)
DIFF PNL FLD: (no result)
GLUCOSE SERPL-MCNC: 129 MG/DL — HIGH (ref 70–99)
GLUCOSE UR QL: NEGATIVE — SIGNIFICANT CHANGE UP
HCT VFR BLD CALC: 37.4 % — LOW (ref 39–50)
HGB BLD-MCNC: 12.1 G/DL — LOW (ref 13–17)
KETONES UR-MCNC: NEGATIVE — SIGNIFICANT CHANGE UP
LEUKOCYTE ESTERASE UR-ACNC: NEGATIVE — SIGNIFICANT CHANGE UP
MCHC RBC-ENTMCNC: 30.3 PG — SIGNIFICANT CHANGE UP (ref 27–34)
MCHC RBC-ENTMCNC: 32.4 G/DL — SIGNIFICANT CHANGE UP (ref 32–36)
MCV RBC AUTO: 93.7 FL — SIGNIFICANT CHANGE UP (ref 80–100)
NITRITE UR-MCNC: NEGATIVE — SIGNIFICANT CHANGE UP
PH UR: 6 — SIGNIFICANT CHANGE UP (ref 5–8)
PLATELET # BLD AUTO: 183 K/UL — SIGNIFICANT CHANGE UP (ref 150–400)
POTASSIUM SERPL-MCNC: 3.9 MMOL/L — SIGNIFICANT CHANGE UP (ref 3.5–5.3)
POTASSIUM SERPL-SCNC: 3.9 MMOL/L — SIGNIFICANT CHANGE UP (ref 3.5–5.3)
PROT SERPL-MCNC: 6.6 G/DL — SIGNIFICANT CHANGE UP (ref 6–8.3)
PROT UR-MCNC: 30 MG/DL
RBC # BLD: 3.99 M/UL — LOW (ref 4.2–5.8)
RBC # FLD: 14.1 % — SIGNIFICANT CHANGE UP (ref 10.3–16.9)
SODIUM SERPL-SCNC: 136 MMOL/L — SIGNIFICANT CHANGE UP (ref 135–145)
SP GR SPEC: 1.02 — SIGNIFICANT CHANGE UP (ref 1–1.03)
UROBILINOGEN FLD QL: 0.2 E.U./DL — SIGNIFICANT CHANGE UP
WBC # BLD: 11.7 K/UL — HIGH (ref 3.8–10.5)
WBC # FLD AUTO: 11.7 K/UL — HIGH (ref 3.8–10.5)

## 2017-09-17 PROCEDURE — 71010: CPT | Mod: 26

## 2017-09-17 RX ORDER — ACETAMINOPHEN 500 MG
650 TABLET ORAL EVERY 6 HOURS
Qty: 0 | Refills: 0 | Status: DISCONTINUED | OUTPATIENT
Start: 2017-09-17 | End: 2017-09-20

## 2017-09-17 RX ORDER — OXYCODONE HYDROCHLORIDE 5 MG/1
10 TABLET ORAL EVERY 12 HOURS
Qty: 0 | Refills: 0 | Status: DISCONTINUED | OUTPATIENT
Start: 2017-09-17 | End: 2017-09-18

## 2017-09-17 RX ORDER — SODIUM CHLORIDE 9 MG/ML
1000 INJECTION INTRAMUSCULAR; INTRAVENOUS; SUBCUTANEOUS
Qty: 0 | Refills: 0 | Status: DISCONTINUED | OUTPATIENT
Start: 2017-09-17 | End: 2017-09-18

## 2017-09-17 RX ORDER — OXYCODONE HYDROCHLORIDE 5 MG/1
5 TABLET ORAL EVERY 4 HOURS
Qty: 0 | Refills: 0 | Status: DISCONTINUED | OUTPATIENT
Start: 2017-09-17 | End: 2017-09-18

## 2017-09-17 RX ADMIN — ATORVASTATIN CALCIUM 20 MILLIGRAM(S): 80 TABLET, FILM COATED ORAL at 21:11

## 2017-09-17 RX ADMIN — MAGNESIUM HYDROXIDE 30 MILLILITER(S): 400 TABLET, CHEWABLE ORAL at 07:56

## 2017-09-17 RX ADMIN — OXYCODONE AND ACETAMINOPHEN 2 TABLET(S): 5; 325 TABLET ORAL at 17:00

## 2017-09-17 RX ADMIN — ENOXAPARIN SODIUM 40 MILLIGRAM(S): 100 INJECTION SUBCUTANEOUS at 22:20

## 2017-09-17 RX ADMIN — Medication 400 MILLIGRAM(S): at 15:01

## 2017-09-17 RX ADMIN — OXYCODONE AND ACETAMINOPHEN 2 TABLET(S): 5; 325 TABLET ORAL at 07:56

## 2017-09-17 RX ADMIN — OXYCODONE AND ACETAMINOPHEN 2 TABLET(S): 5; 325 TABLET ORAL at 16:00

## 2017-09-17 RX ADMIN — CYCLOBENZAPRINE HYDROCHLORIDE 10 MILLIGRAM(S): 10 TABLET, FILM COATED ORAL at 13:08

## 2017-09-17 RX ADMIN — OXYCODONE HYDROCHLORIDE 5 MILLIGRAM(S): 5 TABLET ORAL at 22:45

## 2017-09-17 RX ADMIN — LOSARTAN POTASSIUM 25 MILLIGRAM(S): 100 TABLET, FILM COATED ORAL at 05:34

## 2017-09-17 RX ADMIN — CYCLOBENZAPRINE HYDROCHLORIDE 10 MILLIGRAM(S): 10 TABLET, FILM COATED ORAL at 05:34

## 2017-09-17 RX ADMIN — OXYCODONE HYDROCHLORIDE 10 MILLIGRAM(S): 5 TABLET ORAL at 22:00

## 2017-09-17 RX ADMIN — Medication 100 MILLIGRAM(S): at 13:08

## 2017-09-17 RX ADMIN — Medication 10 MILLIGRAM(S): at 13:11

## 2017-09-17 RX ADMIN — Medication 1000 MILLIGRAM(S): at 15:28

## 2017-09-17 RX ADMIN — OXYCODONE AND ACETAMINOPHEN 2 TABLET(S): 5; 325 TABLET ORAL at 08:56

## 2017-09-17 RX ADMIN — Medication 650 MILLIGRAM(S): at 21:11

## 2017-09-17 RX ADMIN — OXYCODONE AND ACETAMINOPHEN 2 TABLET(S): 5; 325 TABLET ORAL at 01:50

## 2017-09-17 RX ADMIN — CYCLOBENZAPRINE HYDROCHLORIDE 10 MILLIGRAM(S): 10 TABLET, FILM COATED ORAL at 21:59

## 2017-09-17 RX ADMIN — OXYCODONE AND ACETAMINOPHEN 2 TABLET(S): 5; 325 TABLET ORAL at 02:20

## 2017-09-17 RX ADMIN — OXYCODONE HYDROCHLORIDE 10 MILLIGRAM(S): 5 TABLET ORAL at 21:11

## 2017-09-17 RX ADMIN — SODIUM CHLORIDE 60 MILLILITER(S): 9 INJECTION INTRAMUSCULAR; INTRAVENOUS; SUBCUTANEOUS at 21:11

## 2017-09-17 RX ADMIN — OXYCODONE HYDROCHLORIDE 5 MILLIGRAM(S): 5 TABLET ORAL at 21:59

## 2017-09-17 NOTE — PROGRESS NOTE ADULT - SUBJECTIVE AND OBJECTIVE BOX
Pt was examined at the bedside no acute events overnight, c/o stiffness in the back, pt denies sob, cp, n/v, acute numbness or weakness    ICU Vital Signs Last 24 Hrs  T(C): 37.8 (17 Sep 2017 05:38), Max: 38.1 (16 Sep 2017 14:17)  T(F): 100.1 (17 Sep 2017 05:38), Max: 100.5 (16 Sep 2017 14:17)  HR: 97 (17 Sep 2017 05:38) (64 - 108)  BP: 147/84 (17 Sep 2017 05:38) (108/65 - 149/82)  BP(mean): --  ABP: --  ABP(mean): --  RR: 16 (17 Sep 2017 05:38) (16 - 17)  SpO2: 95% (17 Sep 2017 05:38) (95% - 96%)    Exam:  AA&OX3, NAD,  CNs II-XII grossly intact  motor 5/5 x 4 extr,  sensation to LT grossly intact,  Back: incision c/d/i, no palpable collections    Plan:  PT eval = no needs,  SCDs, IS, Bowel Regimen, OOB  SQL for DVT prophylaxis  f/u Labs in AM, trend WBC, H/H  D/w Dr. Young

## 2017-09-18 DIAGNOSIS — R50.9 FEVER, UNSPECIFIED: ICD-10-CM

## 2017-09-18 DIAGNOSIS — I80.9 PHLEBITIS AND THROMBOPHLEBITIS OF UNSPECIFIED SITE: ICD-10-CM

## 2017-09-18 DIAGNOSIS — E78.00 PURE HYPERCHOLESTEROLEMIA, UNSPECIFIED: ICD-10-CM

## 2017-09-18 DIAGNOSIS — I10 ESSENTIAL (PRIMARY) HYPERTENSION: ICD-10-CM

## 2017-09-18 DIAGNOSIS — R33.9 RETENTION OF URINE, UNSPECIFIED: ICD-10-CM

## 2017-09-18 DIAGNOSIS — G47.33 OBSTRUCTIVE SLEEP APNEA (ADULT) (PEDIATRIC): ICD-10-CM

## 2017-09-18 LAB
ANION GAP SERPL CALC-SCNC: 15 MMOL/L — SIGNIFICANT CHANGE UP (ref 5–17)
APPEARANCE UR: CLEAR — SIGNIFICANT CHANGE UP
BILIRUB UR-MCNC: NEGATIVE — SIGNIFICANT CHANGE UP
BUN SERPL-MCNC: 21 MG/DL — SIGNIFICANT CHANGE UP (ref 7–23)
CALCIUM SERPL-MCNC: 9.1 MG/DL — SIGNIFICANT CHANGE UP (ref 8.4–10.5)
CHLORIDE SERPL-SCNC: 96 MMOL/L — SIGNIFICANT CHANGE UP (ref 96–108)
CO2 SERPL-SCNC: 25 MMOL/L — SIGNIFICANT CHANGE UP (ref 22–31)
COLOR SPEC: YELLOW — SIGNIFICANT CHANGE UP
CREAT SERPL-MCNC: 1.09 MG/DL — SIGNIFICANT CHANGE UP (ref 0.5–1.3)
DIFF PNL FLD: (no result)
GLUCOSE SERPL-MCNC: 140 MG/DL — HIGH (ref 70–99)
GLUCOSE UR QL: NEGATIVE — SIGNIFICANT CHANGE UP
HCT VFR BLD CALC: 37.3 % — LOW (ref 39–50)
HGB BLD-MCNC: 12.2 G/DL — LOW (ref 13–17)
KETONES UR-MCNC: NEGATIVE — SIGNIFICANT CHANGE UP
LEUKOCYTE ESTERASE UR-ACNC: NEGATIVE — SIGNIFICANT CHANGE UP
MAGNESIUM SERPL-MCNC: 2.1 MG/DL — SIGNIFICANT CHANGE UP (ref 1.6–2.6)
MCHC RBC-ENTMCNC: 30.5 PG — SIGNIFICANT CHANGE UP (ref 27–34)
MCHC RBC-ENTMCNC: 32.7 G/DL — SIGNIFICANT CHANGE UP (ref 32–36)
MCV RBC AUTO: 93.3 FL — SIGNIFICANT CHANGE UP (ref 80–100)
NITRITE UR-MCNC: NEGATIVE — SIGNIFICANT CHANGE UP
PH UR: 5.5 — SIGNIFICANT CHANGE UP (ref 5–8)
PHOSPHATE SERPL-MCNC: 2.7 MG/DL — SIGNIFICANT CHANGE UP (ref 2.5–4.5)
PLATELET # BLD AUTO: 225 K/UL — SIGNIFICANT CHANGE UP (ref 150–400)
POTASSIUM SERPL-MCNC: 4.1 MMOL/L — SIGNIFICANT CHANGE UP (ref 3.5–5.3)
POTASSIUM SERPL-SCNC: 4.1 MMOL/L — SIGNIFICANT CHANGE UP (ref 3.5–5.3)
PROT UR-MCNC: NEGATIVE MG/DL — SIGNIFICANT CHANGE UP
RBC # BLD: 4 M/UL — LOW (ref 4.2–5.8)
RBC # FLD: 14.1 % — SIGNIFICANT CHANGE UP (ref 10.3–16.9)
SODIUM SERPL-SCNC: 136 MMOL/L — SIGNIFICANT CHANGE UP (ref 135–145)
SP GR SPEC: 1.02 — SIGNIFICANT CHANGE UP (ref 1–1.03)
UROBILINOGEN FLD QL: 0.2 E.U./DL — SIGNIFICANT CHANGE UP
WBC # BLD: 12.4 K/UL — HIGH (ref 3.8–10.5)
WBC # FLD AUTO: 12.4 K/UL — HIGH (ref 3.8–10.5)

## 2017-09-18 PROCEDURE — 99223 1ST HOSP IP/OBS HIGH 75: CPT | Mod: GC

## 2017-09-18 RX ORDER — ACETAMINOPHEN 500 MG
1000 TABLET ORAL ONCE
Qty: 0 | Refills: 0 | Status: COMPLETED | OUTPATIENT
Start: 2017-09-18 | End: 2017-09-18

## 2017-09-18 RX ORDER — CEPHALEXIN 500 MG
500 CAPSULE ORAL
Qty: 0 | Refills: 0 | Status: DISCONTINUED | OUTPATIENT
Start: 2017-09-18 | End: 2017-09-20

## 2017-09-18 RX ORDER — TAMSULOSIN HYDROCHLORIDE 0.4 MG/1
0.4 CAPSULE ORAL AT BEDTIME
Qty: 0 | Refills: 0 | Status: DISCONTINUED | OUTPATIENT
Start: 2017-09-18 | End: 2017-09-18

## 2017-09-18 RX ORDER — HYDROMORPHONE HYDROCHLORIDE 2 MG/ML
0.5 INJECTION INTRAMUSCULAR; INTRAVENOUS; SUBCUTANEOUS ONCE
Qty: 0 | Refills: 0 | Status: DISCONTINUED | OUTPATIENT
Start: 2017-09-18 | End: 2017-09-18

## 2017-09-18 RX ORDER — TAMSULOSIN HYDROCHLORIDE 0.4 MG/1
0.4 CAPSULE ORAL DAILY
Qty: 0 | Refills: 0 | Status: DISCONTINUED | OUTPATIENT
Start: 2017-09-18 | End: 2017-09-20

## 2017-09-18 RX ORDER — METOPROLOL TARTRATE 50 MG
2.5 TABLET ORAL ONCE
Qty: 0 | Refills: 0 | Status: COMPLETED | OUTPATIENT
Start: 2017-09-18 | End: 2017-09-18

## 2017-09-18 RX ORDER — SIMETHICONE 80 MG/1
80 TABLET, CHEWABLE ORAL DAILY
Qty: 0 | Refills: 0 | Status: DISCONTINUED | OUTPATIENT
Start: 2017-09-18 | End: 2017-09-20

## 2017-09-18 RX ADMIN — OXYCODONE HYDROCHLORIDE 5 MILLIGRAM(S): 5 TABLET ORAL at 04:00

## 2017-09-18 RX ADMIN — HYDROMORPHONE HYDROCHLORIDE 0.5 MILLIGRAM(S): 2 INJECTION INTRAMUSCULAR; INTRAVENOUS; SUBCUTANEOUS at 14:02

## 2017-09-18 RX ADMIN — Medication 500 MILLIGRAM(S): at 23:00

## 2017-09-18 RX ADMIN — OXYCODONE HYDROCHLORIDE 10 MILLIGRAM(S): 5 TABLET ORAL at 10:09

## 2017-09-18 RX ADMIN — ENOXAPARIN SODIUM 40 MILLIGRAM(S): 100 INJECTION SUBCUTANEOUS at 23:00

## 2017-09-18 RX ADMIN — OXYCODONE HYDROCHLORIDE 5 MILLIGRAM(S): 5 TABLET ORAL at 12:31

## 2017-09-18 RX ADMIN — Medication 2.5 MILLIGRAM(S): at 17:06

## 2017-09-18 RX ADMIN — OXYCODONE HYDROCHLORIDE 5 MILLIGRAM(S): 5 TABLET ORAL at 07:28

## 2017-09-18 RX ADMIN — OXYCODONE HYDROCHLORIDE 5 MILLIGRAM(S): 5 TABLET ORAL at 08:10

## 2017-09-18 RX ADMIN — HYDROMORPHONE HYDROCHLORIDE 0.5 MILLIGRAM(S): 2 INJECTION INTRAMUSCULAR; INTRAVENOUS; SUBCUTANEOUS at 14:20

## 2017-09-18 RX ADMIN — OXYCODONE HYDROCHLORIDE 10 MILLIGRAM(S): 5 TABLET ORAL at 10:40

## 2017-09-18 RX ADMIN — OXYCODONE HYDROCHLORIDE 5 MILLIGRAM(S): 5 TABLET ORAL at 12:01

## 2017-09-18 RX ADMIN — LOSARTAN POTASSIUM 25 MILLIGRAM(S): 100 TABLET, FILM COATED ORAL at 05:17

## 2017-09-18 RX ADMIN — Medication 500 MILLIGRAM(S): at 10:00

## 2017-09-18 RX ADMIN — Medication 400 MILLIGRAM(S): at 18:13

## 2017-09-18 RX ADMIN — OXYCODONE HYDROCHLORIDE 5 MILLIGRAM(S): 5 TABLET ORAL at 03:24

## 2017-09-18 RX ADMIN — Medication 1000 MILLIGRAM(S): at 19:01

## 2017-09-18 RX ADMIN — TAMSULOSIN HYDROCHLORIDE 0.4 MILLIGRAM(S): 0.4 CAPSULE ORAL at 17:09

## 2017-09-18 RX ADMIN — SIMETHICONE 80 MILLIGRAM(S): 80 TABLET, CHEWABLE ORAL at 14:02

## 2017-09-18 RX ADMIN — ATORVASTATIN CALCIUM 20 MILLIGRAM(S): 80 TABLET, FILM COATED ORAL at 23:00

## 2017-09-18 RX ADMIN — CYCLOBENZAPRINE HYDROCHLORIDE 10 MILLIGRAM(S): 10 TABLET, FILM COATED ORAL at 05:17

## 2017-09-18 RX ADMIN — Medication 500 MILLIGRAM(S): at 18:13

## 2017-09-18 RX ADMIN — CYCLOBENZAPRINE HYDROCHLORIDE 10 MILLIGRAM(S): 10 TABLET, FILM COATED ORAL at 14:02

## 2017-09-18 NOTE — CONSULT NOTE ADULT - PROBLEM SELECTOR RECOMMENDATION 5
patient required Ivory.  he was started on Flomax Send urine culture to rule out infection.  Voiding trial tomorrow

## 2017-09-18 NOTE — CONSULT NOTE ADULT - PROBLEM SELECTOR RECOMMENDATION 2
patient has no diagnosis off obstructive sleep apnea. I observed the patient since then and he desaturates on oxygen while asleep down to 83%. I awoke the patient and after few deep breath is saturation increase to 100%.  I witnessed the patient having apneic episodes. I started the patient on CPAP and informed him and the family he would require sleep study as outpatient.  avoid oversedation

## 2017-09-18 NOTE — CONSULT NOTE ADULT - SUBJECTIVE AND OBJECTIVE BOX
Patient is a 72y old  Male who presents with a chief complaint of LBP (14 Sep 2017 11:44)      HPI:  Obtained from chart. Pt presents for "pain primarily in the back. He is quite miserable because of it. The pain is severe and unremitting. He has had epidural steroids injections. He cannot walk. He has to stoop forward and rest. He has a cane. He is a  and it is hard for him to participate in his work responsibilities." Patient has failed conservative management and is here for elective L4-5 laminectomy. Pt denies fevers, chills, SOB, CP, urinary/bowel incontinence, saddle anesthesia, acute loss of sensation/weakness of extremities. (14 Sep 2017 11:44)      PAST MEDICAL & SURGICAL HISTORY:  Hypercholesterolemia  Hypertension  History of hernia repair  S/P hip replacement: right hip  S/P knee replacement, bilateral      FAMILY HISTORY:      SOCIAL HISTORY:  Smoking Status: [ ] Current, [ ] Former, [ ] Never  Pack Years:    MEDICATIONS:  Pulmonary:    Antimicrobials:  cephalexin 500 milliGRAM(s) Oral four times a day    Anticoagulants:  enoxaparin Injectable 40 milliGRAM(s) SubCutaneous at bedtime    Onc:    GI/:  famotidine    Tablet 20 milliGRAM(s) Oral Once PRN  docusate sodium 100 milliGRAM(s) Oral daily  polyethylene glycol 3350 17 Gram(s) Oral every 12 hours PRN  magnesium hydroxide Suspension 30 milliLiter(s) Oral daily PRN  simethicone 80 milliGRAM(s) Chew daily PRN    Endocrine:  atorvastatin 20 milliGRAM(s) Oral at bedtime    Cardiac:  losartan 25 milliGRAM(s) Oral daily  tamsulosin 0.4 milliGRAM(s) Oral daily    Other Medications:  BUpivacaine liposome 1.3% Injectable (no eMAR) 20 milliLiter(s) Local Injection Once  ondansetron Injectable 4 milliGRAM(s) IV Push Once PRN  acetaminophen   Tablet 650 milliGRAM(s) Oral every 6 hours PRN      Allergies    No Known Allergies    Intolerances        Vital Signs Last 24 Hrs  T(C): 37.2 (18 Sep 2017 20:42), Max: 37.7 (18 Sep 2017 00:41)  T(F): 98.9 (18 Sep 2017 20:42), Max: 99.9 (18 Sep 2017 00:41)  HR: 94 (18 Sep 2017 20:42) (92 - 115)  BP: 109/58 (18 Sep 2017 20:42) (109/58 - 177/107)  BP(mean): 89 (18 Sep 2017 14:49) (89 - 89)  RR: 18 (18 Sep 2017 20:42) (16 - 18)  SpO2: 95% (18 Sep 2017 20:42) (94% - 97%)     @ 07:  -   @ 07:00  --------------------------------------------------------  IN: 420 mL / OUT: 300 mL / NET: 120 mL     @ 07:  -   @ 21:52  --------------------------------------------------------  IN: 280 mL / OUT: 1475 mL / NET: -1195 mL          LABS:      CBC Full  -  ( 18 Sep 2017 07:31 )  WBC Count : 12.4 K/uL  Hemoglobin : 12.2 g/dL  Hematocrit : 37.3 %  Platelet Count - Automated : 225 K/uL  Mean Cell Volume : 93.3 fL  Mean Cell Hemoglobin : 30.5 pg  Mean Cell Hemoglobin Concentration : 32.7 g/dL  Auto Neutrophil # : x  Auto Lymphocyte # : x  Auto Monocyte # : x  Auto Eosinophil # : x  Auto Basophil # : x  Auto Neutrophil % : x  Auto Lymphocyte % : x  Auto Monocyte % : x  Auto Eosinophil % : x  Auto Basophil % : x        136  |  96  |  21  ----------------------------<  140<H>  4.1   |  25  |  1.09    Ca    9.1      18 Sep 2017 07:31  Phos  2.7       Mg     2.1         TPro  6.6  /  Alb  3.5  /  TBili  0.9  /  DBili  x   /  AST  13  /  ALT  11  /  AlkPhos  47            Urinalysis Basic - ( 18 Sep 2017 16:52 )    Color: Yellow / Appearance: Clear / S.020 / pH: x  Gluc: x / Ketone: NEGATIVE  / Bili: NEGATIVE / Urobili: 0.2 E.U./dL   Blood: x / Protein: NEGATIVE mg/dL / Nitrite: NEGATIVE   Leuk Esterase: NEGATIVE / RBC: 5-10 /HPF / WBC < 5 /HPF   Sq Epi: x / Non Sq Epi: Rare /HPF / Bacteria: Present /HPF                  RADIOLOGY & ADDITIONAL STUDIES (The following images were personally reviewed):

## 2017-09-18 NOTE — CONSULT NOTE ADULT - PROBLEM SELECTOR RECOMMENDATION 9
is related to the left arm phlebitis.  no clinical evidence of underlying pneumonia. Send urine for culture.

## 2017-09-18 NOTE — PROGRESS NOTE ADULT - SUBJECTIVE AND OBJECTIVE BOX
HPI:  Obtained from chart. Pt presents for "pain primarily in the back. He is quite miserable because of it. The pain is severe and unremitting. He has had epidural steroids injections. He cannot walk. He has to stoop forward and rest. He has a cane. He is a  and it is hard for him to participate in his work responsibilities." Patient has failed conservative management and is here for elective L4-5 laminectomy. Pt denies fevers, chills, SOB, CP, urinary/bowel incontinence, saddle anesthesia, acute loss of sensation/weakness of extremities. (14 Sep 2017 11:44)    OVERNIGHT EVENTS:  Vital Signs Last 24 Hrs  T(C): 37.7 (18 Sep 2017 08:30), Max: 38.8 (17 Sep 2017 15:05)  T(F): 99.9 (18 Sep 2017 08:30), Max: 101.9 (17 Sep 2017 15:05)  HR: 110 (18 Sep 2017 08:30) (92 - 110)  BP: 139/72 (18 Sep 2017 08:30) (119/70 - 152/80)  BP(mean): --  RR: 16 (18 Sep 2017 08:30) (16 - 18)  SpO2: 95% (18 Sep 2017 08:30) (93% - 98%)    I&O's Summary    17 Sep 2017 07:01  -  18 Sep 2017 07:00  --------------------------------------------------------  IN: 420 mL / OUT: 300 mL / NET: 120 mL    18 Sep 2017 07:01  -  18 Sep 2017 10:16  --------------------------------------------------------  IN: 180 mL / OUT: 0 mL / NET: 180 mL        PHYSICAL EXAM:  Neurological: A&OX3 Cranial nerves intact  LACY  Lami incision CDI  R arm phlebitis    Cardiovascular:RRR  Respiratory:Lungs CTAB  Gastrointestinal: +BS  Genitourinary: Voidig without difficulty  Extremities: warm and dry    DIET: Regular      LABS:                        12.2   12.4  )-----------( 225      ( 18 Sep 2017 07:31 )             37.3     09-18    136  |  96  |  21  ----------------------------<  140<H>  4.1   |  25  |  1.09    Ca    9.1      18 Sep 2017 07:31    TPro  6.6  /  Alb  3.5  /  TBili  0.9  /  DBili  x   /  AST  13  /  ALT  11  /  AlkPhos  47        Urinalysis Basic - ( 17 Sep 2017 16:13 )    Color: Yellow / Appearance: Clear / S.020 / pH: x  Gluc: x / Ketone: NEGATIVE  / Bili: NEGATIVE / Urobili: 0.2 E.U./dL   Blood: x / Protein: 30 mg/dL / Nitrite: NEGATIVE   Leuk Esterase: NEGATIVE / RBC: < 5 /HPF / WBC < 5 /HPF   Sq Epi: x / Non Sq Epi: Few /HPF / Bacteria: Present /HPF      Drug Levels: [] N/A    CSF Analysis: [] N/A      Allergies    No Known Allergies    Intolerances      MEDICATIONS:  Antibiotics:  cephalexin 500 milliGRAM(s) Oral four times a day    Neuro:  ondansetron Injectable 4 milliGRAM(s) IV Push Once PRN  cyclobenzaprine 10 milliGRAM(s) Oral three times a day  acetaminophen   Tablet 650 milliGRAM(s) Oral every 6 hours PRN  oxyCODONE  ER Tablet 10 milliGRAM(s) Oral every 12 hours  oxyCODONE    IR 5 milliGRAM(s) Oral every 4 hours PRN    Anticoagulation:  enoxaparin Injectable 40 milliGRAM(s) SubCutaneous at bedtime    OTHER:  BUpivacaine liposome 1.3% Injectable (no eMAR) 20 milliLiter(s) Local Injection Once  famotidine    Tablet 20 milliGRAM(s) Oral Once PRN  losartan 25 milliGRAM(s) Oral daily  atorvastatin 20 milliGRAM(s) Oral at bedtime  docusate sodium 100 milliGRAM(s) Oral daily  polyethylene glycol 3350 17 Gram(s) Oral every 12 hours PRN  magnesium hydroxide Suspension 30 milliLiter(s) Oral daily PRN    IVF:  sodium chloride 0.9%. 1000 milliLiter(s) IV Continuous <Continuous>      ASSESSMENT:  72y Male s/p Lumbar lami  hospitalization post op fevers  +Phlebitis    PLAN:    NEURO:    Monitor neuro status  OT/PT  Pain management  F/U fever w/u  Keflex for phlebitis  Medical consult  Continue current medical regime    Dispo: Discussed with attending

## 2017-09-18 NOTE — PROGRESS NOTE ADULT - SUBJECTIVE AND OBJECTIVE BOX
NEUROSURGERY PAIN MANAGEMENT PROGRESS NOTE    PLAN:   - Hold pain medications for now, pt disoriented  - IV Tylenol as needed, pt spiking fever, but cx negative.     HPI:  Obtained from chart. Pt presents for "pain primarily in the back. He is quite miserable because of it. The pain is severe and unremitting. He has had epidural steroids injections. He cannot walk. He has to stoop forward and rest. He has a cane. He is a  and it is hard for him to participate in his work responsibilities." Patient has failed conservative management and is here for elective L4-5 laminectomy. Pt denies fevers, chills, SOB, CP, urinary/bowel incontinence, saddle anesthesia, acute loss of sensation/weakness of extremities. (14 Sep 2017 11:44)      PAST MEDICAL & SURGICAL HISTORY:  Hypercholesterolemia  Hypertension  History of hernia repair  S/P hip replacement: right hip  S/P knee replacement, bilateral      FAMILY HISTORY:      SOCIAL HISTORY:  [ ] Denies Smoking, Alcohol, or Drug Use    HOME MEDICATIONS:   Please refer to initial HNP    PAIN HOME MEDICATIONS:    Allergies    No Known Allergies    Intolerances        PAIN MEDICATIONS:  ondansetron Injectable 4 milliGRAM(s) IV Push Once PRN  acetaminophen   Tablet 650 milliGRAM(s) Oral every 6 hours PRN  oxyCODONE  ER Tablet 10 milliGRAM(s) Oral every 12 hours  oxyCODONE    IR 5 milliGRAM(s) Oral every 4 hours PRN    Heme:  enoxaparin Injectable 40 milliGRAM(s) SubCutaneous at bedtime    Antibiotics:  cephalexin 500 milliGRAM(s) Oral four times a day    Cardiovascular:  losartan 25 milliGRAM(s) Oral daily  metoprolol Injectable 2.5 milliGRAM(s) IV Push once    GI:  famotidine    Tablet 20 milliGRAM(s) Oral Once PRN  docusate sodium 100 milliGRAM(s) Oral daily  polyethylene glycol 3350 17 Gram(s) Oral every 12 hours PRN  magnesium hydroxide Suspension 30 milliLiter(s) Oral daily PRN  simethicone 80 milliGRAM(s) Chew daily PRN    Endocrine:  atorvastatin 20 milliGRAM(s) Oral at bedtime    All Other Medications:  BUpivacaine liposome 1.3% Injectable (no eMAR) 20 milliLiter(s) Local Injection Once  sodium chloride 0.9%. 1000 milliLiter(s) IV Continuous <Continuous>      Vital Signs Last 24 Hrs  T(C): 37.2 (18 Sep 2017 14:49), Max: 38.5 (17 Sep 2017 20:30)  T(F): 99 (18 Sep 2017 14:49), Max: 101.3 (17 Sep 2017 20:30)  HR: 111 (18 Sep 2017 14:49) (92 - 115)  BP: 156/- (18 Sep 2017 14:49) (119/70 - 177/107)  BP(mean): 89 (18 Sep 2017 14:49) (89 - 89)  RR: 18 (18 Sep 2017 14:49) (16 - 18)  SpO2: 94% (18 Sep 2017 12:08) (93% - 98%)    LABS:                        12.2   12.4  )-----------( 225      ( 18 Sep 2017 07:31 )             37.3     -18    136  |  96  |  21  ----------------------------<  140<H>  4.1   |  25  |  1.09    Ca    9.1      18 Sep 2017 07:31  Phos  2.7     18  Mg     2.1     -18    TPro  6.6  /  Alb  3.5  /  TBili  0.9  /  DBili  x   /  AST  13  /  ALT  11  /  AlkPhos  47  -17      Urinalysis Basic - ( 17 Sep 2017 16:13 )    Color: Yellow / Appearance: Clear / S.020 / pH: x  Gluc: x / Ketone: NEGATIVE  / Bili: NEGATIVE / Urobili: 0.2 E.U./dL   Blood: x / Protein: 30 mg/dL / Nitrite: NEGATIVE   Leuk Esterase: NEGATIVE / RBC: < 5 /HPF / WBC < 5 /HPF   Sq Epi: x / Non Sq Epi: Few /HPF / Bacteria: Present /HPF        REVIEW OF SYSTEMS:  CONSTITUTIONAL: Fatigued, pt not oriented, pt disoriented on exam--sleeping mid exam--snoring  EYES: No eye pain, visual disturbances  ENMT:  No difficulty hearing, tinnitus. No sinus or throat pain  NECK: No pain or stiffness  RESPIRATORY: SOB, no wheezing  CARDIOVASCULAR: No chest pain, palpitations.   GASTROINTESTINAL: Pt reports passing gas. Small BM yesterday.    : Retaining urine. 990cc. Pt feels sig. abdominal distension.  NEUROLOGICAL: Incisional back pain. No headaches, memory loss, loss of strength, numbness, or tremors.   MUSCULOSKELETAL: Denies muscle stiffness. No joint pain or swelling    FUNCTIONAL ASSESSMENT:  PAIN SCORE AT REST:    6/10    SCALE USED: (1-10 VNRS)  PAIN SCORE WITH ACTIVITY:  8/10        SCALE USED: (1-10 VNRS)  Comment: Pt reporting sig. back pain.     PAIN ASSESSMENT:  Pt c/o severe positional, (when pressure placed on incision) posterior back pain r/t incision. Moderate in severity and only with movement, difficult to find a comfortable position. Denies radic, denies numbness/tingling.    PHYSICAL EXAM  GENERAL: NAD, well-groomed, well-developed  NERVOUS SYSTEM:    Alert & Oriented X2--to time, place, person, not situation (does not know what surgery he had done)  Cranial nerves grossly intact  Motor Strength 5/5 B/L upper and lower extremities;   Sensation intact to LT in UE/LE in 3 dermatomes  Lumbar: Incisional mild lumbar tenderness. - SLR, - XSLR. Lumbar ROM not assessed, s/p surgery and restricted turning.  CHEST/LUNG: Clear to auscultation bilaterally; No rales, rhonchi, wheezing, or rubs  HEART: Regular rate and rhythm; No murmurs, rubs, or gallops  ABDOMEN: Softly distended, hypoactive BS++    ASSESSMENT:   73yo M w/ c/o back pain s/p L45 lami POD 1    PLAN:   1. Opioids  Pt disoriented, not requesting pain medications, but in sig. pain. Tachycardic, pending U/S workup.     PLAN: No IVP medications for now. No opioids for now. IV Tylenol. Can resume if pt reoriented. workup postive for etiology.    2. Neuropathics  Pt currently denies neuropathic pain. No numbness/tingling/electric shock like sensation in LE/UE b.l.    PLAN: No indication for neuropathic agents.     3. Adjuvants  Pt not c/o stiffness this Am, was on Flexeril.      PLAN: Dcd Flexeril, pt retaining urine, dry oral mucosa. Dc Flexeril for anticholinergic effects. Although pt was on Flexeril at home and instructed to refuse if given, would not recommend for now, given disorientation.     4. Prophylactic:   Bowel regimen: Docusate Senna; Miralax;   Nausea PRN: Zofran PRN for Nausea, pt does not c/o current    5. Functional Goals:   Pt will get OOB with PT today. Pt will resume previous level of activity without impairment from surgery.     6. Additional Consults:   Per primary team    7. Additional Labs/Imaging:   Dopplers UA.    8. Follow up, Discharge Planning:   Patient is set for discharge to: Disoriented, workup  Discharge is pending: Disoriented, workup  Pain Management follow up plan: F/u outpt. inpatient

## 2017-09-19 LAB
ANION GAP SERPL CALC-SCNC: 10 MMOL/L — SIGNIFICANT CHANGE UP (ref 5–17)
BUN SERPL-MCNC: 26 MG/DL — HIGH (ref 7–23)
CALCIUM SERPL-MCNC: 9 MG/DL — SIGNIFICANT CHANGE UP (ref 8.4–10.5)
CHLORIDE SERPL-SCNC: 96 MMOL/L — SIGNIFICANT CHANGE UP (ref 96–108)
CO2 SERPL-SCNC: 30 MMOL/L — SIGNIFICANT CHANGE UP (ref 22–31)
CREAT SERPL-MCNC: 1.24 MG/DL — SIGNIFICANT CHANGE UP (ref 0.5–1.3)
GLUCOSE SERPL-MCNC: 106 MG/DL — HIGH (ref 70–99)
HCT VFR BLD CALC: 33.4 % — LOW (ref 39–50)
HGB BLD-MCNC: 10.9 G/DL — LOW (ref 13–17)
MAGNESIUM SERPL-MCNC: 2.4 MG/DL — SIGNIFICANT CHANGE UP (ref 1.6–2.6)
MCHC RBC-ENTMCNC: 30.9 PG — SIGNIFICANT CHANGE UP (ref 27–34)
MCHC RBC-ENTMCNC: 32.6 G/DL — SIGNIFICANT CHANGE UP (ref 32–36)
MCV RBC AUTO: 94.6 FL — SIGNIFICANT CHANGE UP (ref 80–100)
PHOSPHATE SERPL-MCNC: 3 MG/DL — SIGNIFICANT CHANGE UP (ref 2.5–4.5)
PLATELET # BLD AUTO: 234 K/UL — SIGNIFICANT CHANGE UP (ref 150–400)
POTASSIUM SERPL-MCNC: 4.4 MMOL/L — SIGNIFICANT CHANGE UP (ref 3.5–5.3)
POTASSIUM SERPL-SCNC: 4.4 MMOL/L — SIGNIFICANT CHANGE UP (ref 3.5–5.3)
RBC # BLD: 3.53 M/UL — LOW (ref 4.2–5.8)
RBC # FLD: 14 % — SIGNIFICANT CHANGE UP (ref 10.3–16.9)
SODIUM SERPL-SCNC: 136 MMOL/L — SIGNIFICANT CHANGE UP (ref 135–145)
URATE SERPL-MCNC: 8.3 MG/DL — SIGNIFICANT CHANGE UP (ref 3.4–8.8)
WBC # BLD: 10.7 K/UL — HIGH (ref 3.8–10.5)
WBC # FLD AUTO: 10.7 K/UL — HIGH (ref 3.8–10.5)

## 2017-09-19 PROCEDURE — 99233 SBSQ HOSP IP/OBS HIGH 50: CPT | Mod: GC

## 2017-09-19 RX ORDER — ACETAMINOPHEN 500 MG
1000 TABLET ORAL ONCE
Qty: 0 | Refills: 0 | Status: DISCONTINUED | OUTPATIENT
Start: 2017-09-19 | End: 2017-09-20

## 2017-09-19 RX ORDER — ALLOPURINOL 300 MG
50 TABLET ORAL DAILY
Qty: 0 | Refills: 0 | Status: DISCONTINUED | OUTPATIENT
Start: 2017-09-19 | End: 2017-09-20

## 2017-09-19 RX ORDER — INDOMETHACIN 50 MG
15 CAPSULE ORAL EVERY 12 HOURS
Qty: 0 | Refills: 0 | Status: DISCONTINUED | OUTPATIENT
Start: 2017-09-19 | End: 2017-09-19

## 2017-09-19 RX ORDER — INDOMETHACIN 50 MG
50 CAPSULE ORAL EVERY 12 HOURS
Qty: 0 | Refills: 0 | Status: DISCONTINUED | OUTPATIENT
Start: 2017-09-19 | End: 2017-09-20

## 2017-09-19 RX ADMIN — Medication 100 MILLIGRAM(S): at 12:25

## 2017-09-19 RX ADMIN — ATORVASTATIN CALCIUM 20 MILLIGRAM(S): 80 TABLET, FILM COATED ORAL at 20:52

## 2017-09-19 RX ADMIN — Medication 50 MILLIGRAM(S): at 12:25

## 2017-09-19 RX ADMIN — Medication 650 MILLIGRAM(S): at 19:45

## 2017-09-19 RX ADMIN — Medication 50 MILLIGRAM(S): at 21:26

## 2017-09-19 RX ADMIN — Medication 500 MILLIGRAM(S): at 12:25

## 2017-09-19 RX ADMIN — LOSARTAN POTASSIUM 25 MILLIGRAM(S): 100 TABLET, FILM COATED ORAL at 05:31

## 2017-09-19 RX ADMIN — Medication 500 MILLIGRAM(S): at 20:52

## 2017-09-19 RX ADMIN — TAMSULOSIN HYDROCHLORIDE 0.4 MILLIGRAM(S): 0.4 CAPSULE ORAL at 12:25

## 2017-09-19 RX ADMIN — ENOXAPARIN SODIUM 40 MILLIGRAM(S): 100 INJECTION SUBCUTANEOUS at 20:52

## 2017-09-19 RX ADMIN — Medication 50 MILLIGRAM(S): at 20:52

## 2017-09-19 RX ADMIN — Medication 500 MILLIGRAM(S): at 17:35

## 2017-09-19 RX ADMIN — Medication 500 MILLIGRAM(S): at 05:31

## 2017-09-19 NOTE — DIETITIAN INITIAL EVALUATION ADULT. - OTHER INFO
72 y.o./male admitted 2/2 back pain. Reports fair appetite and intake PTA, less intake than usual 2/2 pain and exhaustion. In-house decreased appetite <50% intake at meals. No complaints of N/V/C/D or difficulty chewing or swallowing. BM+ 9/18. Pain being controlled. Skin intact pressure-wise; surgical incision on back.

## 2017-09-19 NOTE — PROGRESS NOTE ADULT - SUBJECTIVE AND OBJECTIVE BOX
NEUROSURGERY PAIN MANAGEMENT PROGRESS NOTE    PLAN:   - No opioids, flexeril  - IV Tylenol for breakthrough pain   - Uric acid, Allopurinol for now, Colchicine if elevated Uric acid    HPI:  Obtained from chart. Pt presents for "pain primarily in the back. He is quite miserable because of it. The pain is severe and unremitting. He has had epidural steroids injections. He cannot walk. He has to stoop forward and rest. He has a cane. He is a  and it is hard for him to participate in his work responsibilities." Patient has failed conservative management and is here for elective L4-5 laminectomy. Pt denies fevers, chills, SOB, CP, urinary/bowel incontinence, saddle anesthesia, acute loss of sensation/weakness of extremities. (14 Sep 2017 11:44)      PAST MEDICAL & SURGICAL HISTORY:  Hypercholesterolemia  Hypertension  History of hernia repair  S/P hip replacement: right hip  S/P knee replacement, bilateral      Allergies  No Known Allergies    Intolerances    PAIN MEDICATIONS:  ondansetron Injectable 4 milliGRAM(s) IV Push Once PRN  acetaminophen   Tablet 650 milliGRAM(s) Oral every 6 hours PRN    Heme:  enoxaparin Injectable 40 milliGRAM(s) SubCutaneous at bedtime    Antibiotics:  cephalexin 500 milliGRAM(s) Oral four times a day    Cardiovascular:  losartan 25 milliGRAM(s) Oral daily  tamsulosin 0.4 milliGRAM(s) Oral daily    GI:  famotidine    Tablet 20 milliGRAM(s) Oral Once PRN  docusate sodium 100 milliGRAM(s) Oral daily  polyethylene glycol 3350 17 Gram(s) Oral every 12 hours PRN  magnesium hydroxide Suspension 30 milliLiter(s) Oral daily PRN  simethicone 80 milliGRAM(s) Chew daily PRN    Endocrine:  atorvastatin 20 milliGRAM(s) Oral at bedtime  allopurinol 50 milliGRAM(s) Oral daily    All Other Medications:  BUpivacaine liposome 1.3% Injectable (no eMAR) 20 milliLiter(s) Local Injection Once      Vital Signs Last 24 Hrs  T(C): 38.1 (19 Sep 2017 08:20), Max: 38.1 (19 Sep 2017 04:01)  T(F): 100.6 (19 Sep 2017 08:20), Max: 100.6 (19 Sep 2017 08:20)  HR: 96 (19 Sep 2017 09:25) (90 - 111)  BP: 121/55 (19 Sep 2017 08:20) (109/58 - 156/-)  BP(mean): 89 (18 Sep 2017 14:49) (89 - 89)  RR: 16 (19 Sep 2017 09:25) (15 - 18)  SpO2: 94% (19 Sep 2017 09:25) (93% - 99%)    LABS:                        12.2   12.4  )-----------( 225      ( 18 Sep 2017 07:31 )             37.3     18    136  |  96  |  21  ----------------------------<  140<H>  4.1   |  25  |  1.09    Ca    9.1      18 Sep 2017 07:31  Phos  2.7       Mg     2.1     18        Urinalysis Basic - ( 18 Sep 2017 16:52 )    Color: Yellow / Appearance: Clear / S.020 / pH: x  Gluc: x / Ketone: NEGATIVE  / Bili: NEGATIVE / Urobili: 0.2 E.U./dL   Blood: x / Protein: NEGATIVE mg/dL / Nitrite: NEGATIVE   Leuk Esterase: NEGATIVE / RBC: 5-10 /HPF / WBC < 5 /HPF   Sq Epi: x / Non Sq Epi: Rare /HPF / Bacteria: Present /HPF      REVIEW OF SYSTEMS:  CONSTITUTIONAL: Fatigued, pt oriented/alert today.   EYES: No eye pain, visual disturbances  ENMT:  No difficulty hearing, tinnitus. No sinus or throat pain  NECK: No pain or stiffness  RESPIRATORY: CPAP, DIOGO--started on trial   CARDIOVASCULAR: No chest pain, palpitations.   NEUROLOGICAL: Incisional back pain--mild. No headaches, memory loss, loss of strength, numbness, or tremors.   MUSCULOSKELETAL: Denies muscle stiffness. No joint pain or swelling. R ankle joint swelling, occurs with gout attacks.    FUNCTIONAL ASSESSMENT:  PAIN SCORE AT REST:    2-3/10    SCALE USED: (1-10 VNRS)  PAIN SCORE WITH ACTIVITY:  8/10  --- R ankle      SCALE USED: (1-10 VNRS)  Comment: Pt reporting new ankle pain    PAIN ASSESSMENT:  Pt c/o severe positional, (when pressure placed on incision) posterior back pain r/t incision. Moderate in severity and only with movement, difficult to find a comfortable position. Denies radic, denies numbness/tingling.    PHYSICAL EXAM  GENERAL: NAD, well-groomed, well-developed  NERVOUS SYSTEM:    Alert & Oriented X4-today to -to time, place, person, situation (previously did not know what surgery he had done)  Cranial nerves grossly intact  Motor Strength 5/5 B/L upper and lower extremities;   Sensation intact to LT in UE/LE in 3 dermatomes  Lumbar: Incisional mild lumbar tenderness. - SLR, - XSLR. Lumbar ROM not assessed, s/p surgery and restricted turning.  CHEST/LUNG: Clear to auscultation bilaterally; No rales, rhonchi, wheezing, or rubs  HEART: Regular rate and rhythm; No murmurs, rubs, or gallops  ABDOMEN: Softly distended, hypoactive BS++    ASSESSMENT:   73yo M w/ c/o back pain s/p L45 lami POD 5 	    PLAN:   1. Opioids  Pt more oriented/aware today. To situation now. Not c/o severe LBP. Stating that it is mild. C/o new onset R foot soreness     PLAN: No IVP medications for now. No opioids for now. IV Tylenol. Can resume if pt reoriented. workup postive for etiology.    2. Neuropathics  Pt currently denies neuropathic pain. No numbness/tingling/electric shock like sensation in LE/UE b.l.    PLAN: No indication for neuropathic agents.     3. Adjuvants  Pt not c/o stiffness this Am, was on Flexeril.      PLAN: Dcd Flexeril. Hold for UR, hold for AMS.    4. Prophylactic:   Bowel regimen: As above.  Nausea PRN: Zofran PRN for Nausea, pt does not c/o current    5. Functional Goals:   Pt will get OOB with PT today. Pt will resume previous level of activity without impairment from surgery.     6. Additional Consults:   None    7. Additional Labs/Imaging:   Uric acid    8. Follow up, Discharge Planning:   Patient is set for discharge to: [facility]  Pending workup

## 2017-09-19 NOTE — PROGRESS NOTE ADULT - SUBJECTIVE AND OBJECTIVE BOX
Interval Events: reviewed  Patient seen and examined at bedside.    Patient is a 72y old  Male who presents with a chief complaint of LBP (14 Sep 2017 11:44)    he slept last night  few hours on the mask. He is feeling better today  PAST MEDICAL & SURGICAL HISTORY:  Hypercholesterolemia  Hypertension  History of hernia repair  S/P hip replacement: right hip  S/P knee replacement, bilateral      MEDICATIONS:  Pulmonary:    Antimicrobials:  cephalexin 500 milliGRAM(s) Oral four times a day    Anticoagulants:  enoxaparin Injectable 40 milliGRAM(s) SubCutaneous at bedtime    Cardiac:  losartan 25 milliGRAM(s) Oral daily  tamsulosin 0.4 milliGRAM(s) Oral daily      Allergies    No Known Allergies    Intolerances        Vital Signs Last 24 Hrs  T(C): 38.1 (19 Sep 2017 08:20), Max: 38.1 (19 Sep 2017 04:01)  T(F): 100.6 (19 Sep 2017 08:20), Max: 100.6 (19 Sep 2017 08:20)  HR: 106 (19 Sep 2017 08:20) (90 - 115)  BP: 121/55 (19 Sep 2017 08:20) (109/58 - 177/107)  BP(mean): 89 (18 Sep 2017 14:49) (89 - 89)  RR: 15 (19 Sep 2017 08:20) (15 - 18)  SpO2: 93% (19 Sep 2017 08:20) (93% - 99%)     @ 07:01  -   @ 07:00  --------------------------------------------------------  IN: 400 mL / OUT: 2325 mL / NET: -1925 mL          LABS:      CBC Full  -  ( 18 Sep 2017 07:31 )  WBC Count : 12.4 K/uL  Hemoglobin : 12.2 g/dL  Hematocrit : 37.3 %  Platelet Count - Automated : 225 K/uL  Mean Cell Volume : 93.3 fL  Mean Cell Hemoglobin : 30.5 pg  Mean Cell Hemoglobin Concentration : 32.7 g/dL  Auto Neutrophil # : x  Auto Lymphocyte # : x  Auto Monocyte # : x  Auto Eosinophil # : x  Auto Basophil # : x  Auto Neutrophil % : x  Auto Lymphocyte % : x  Auto Monocyte % : x  Auto Eosinophil % : x  Auto Basophil % : x        136  |  96  |  21  ----------------------------<  140<H>  4.1   |  25  |  1.09    Ca    9.1      18 Sep 2017 07:31  Phos  2.7       Mg     2.1                 Urinalysis Basic - ( 18 Sep 2017 16:52 )    Color: Yellow / Appearance: Clear / S.020 / pH: x  Gluc: x / Ketone: NEGATIVE  / Bili: NEGATIVE / Urobili: 0.2 E.U./dL   Blood: x / Protein: NEGATIVE mg/dL / Nitrite: NEGATIVE   Leuk Esterase: NEGATIVE / RBC: 5-10 /HPF / WBC < 5 /HPF   Sq Epi: x / Non Sq Epi: Rare /HPF / Bacteria: Present /HPF                  RADIOLOGY & ADDITIONAL STUDIES (The following images were personally reviewed):  Ivory:                            Yes          Urine output:               Yes         DVT prophylaxis:         Yes        Flattus:                          Yes          Bowel movement:       Yes

## 2017-09-19 NOTE — PROGRESS NOTE ADULT - SUBJECTIVE AND OBJECTIVE BOX
HPI:  Obtained from chart. Pt presents for "pain primarily in the back. He is quite miserable because of it. The pain is severe and unremitting. He has had epidural steroids injections. He cannot walk. He has to stoop forward and rest. He has a cane. He is a  and it is hard for him to participate in his work responsibilities." Patient has failed conservative management and is here for elective L4-5 laminectomy. Pt denies fevers, chills, SOB, CP, urinary/bowel incontinence, saddle anesthesia, acute loss of sensation/weakness of extremities. (14 Sep 2017 11:44)    OVERNIGHT EVENTS: No acute events overnight. Tavarez still in place. Pt states mild back incision site pain. Denies acute weakness/loss of sensation of extremities.    Vital Signs Last 24 Hrs  T(C): 38.1 (19 Sep 2017 08:20), Max: 38.1 (19 Sep 2017 04:01)  T(F): 100.6 (19 Sep 2017 08:20), Max: 100.6 (19 Sep 2017 08:20)  HR: 106 (19 Sep 2017 08:20) (90 - 115)  BP: 121/55 (19 Sep 2017 08:20) (109/58 - 177/107)  BP(mean): 89 (18 Sep 2017 14:49) (89 - 89)  RR: 15 (19 Sep 2017 08:20) (15 - 18)  SpO2: 93% (19 Sep 2017 08:20) (93% - 99%)    I&O's Summary    18 Sep 2017 07:01  -  19 Sep 2017 07:00  --------------------------------------------------------  IN: 400 mL / OUT: 2325 mL / NET: -1925 mL      PHYSICAL EXAM:  Neurological: AAOx3, FC, speech coherent  CNII-XII: grossly intact  Motor: MAEx4 5/5 UE and LE B/l  SILT throughout  Back incision site C/D/I  LUE forearm phlebitis resolving, nontender, mild erythema    TUBES/LINES:  [x] Tavarez  [] Lumbar Drain  [] Wound Drains  [] Others      DIET:  [] NPO  [x] Mechanical  [] Tube feeds    LABS:                        12.2   12.4  )-----------( 225      ( 18 Sep 2017 07:31 )             37.3     -18    136  |  96  |  21  ----------------------------<  140<H>  4.1   |  25  |  1.09    Ca    9.1      18 Sep 2017 07:31  Phos  2.7       Mg     2.1             Urinalysis Basic - ( 18 Sep 2017 16:52 )    Color: Yellow / Appearance: Clear / S.020 / pH: x  Gluc: x / Ketone: NEGATIVE  / Bili: NEGATIVE / Urobili: 0.2 E.U./dL   Blood: x / Protein: NEGATIVE mg/dL / Nitrite: NEGATIVE   Leuk Esterase: NEGATIVE / RBC: 5-10 /HPF / WBC < 5 /HPF   Sq Epi: x / Non Sq Epi: Rare /HPF / Bacteria: Present /HPF          CAPILLARY BLOOD GLUCOSE          Drug Levels: [] N/A    CSF Analysis: [] N/A      Allergies    No Known Allergies    Intolerances      MEDICATIONS:  Antibiotics:  cephalexin 500 milliGRAM(s) Oral four times a day    Neuro:  ondansetron Injectable 4 milliGRAM(s) IV Push Once PRN  acetaminophen   Tablet 650 milliGRAM(s) Oral every 6 hours PRN    Anticoagulation:  enoxaparin Injectable 40 milliGRAM(s) SubCutaneous at bedtime    OTHER:  BUpivacaine liposome 1.3% Injectable (no eMAR) 20 milliLiter(s) Local Injection Once  famotidine    Tablet 20 milliGRAM(s) Oral Once PRN  losartan 25 milliGRAM(s) Oral daily  atorvastatin 20 milliGRAM(s) Oral at bedtime  docusate sodium 100 milliGRAM(s) Oral daily  polyethylene glycol 3350 17 Gram(s) Oral every 12 hours PRN  magnesium hydroxide Suspension 30 milliLiter(s) Oral daily PRN  simethicone 80 milliGRAM(s) Chew daily PRN  tamsulosin 0.4 milliGRAM(s) Oral daily    IVF:    CULTURES:    RADIOLOGY & ADDITIONAL TESTS:      ASSESSMENT:  72y Male s/p L4-5 laminectomy    M43.10  No h/o HF  Family history of coronary artery disease  Handoff  MEWS Score  Hypercholesterolemia  Hypertension  Lumbar spinal stenosis  Spinal stenosis of lumbar region  Phlebitis  Urinary retention  Hypertension  Hypercholesterolemia  Obstructive sleep apnea  Fever  Lumbar spine surgery  History of hernia repair  S/P hip replacement  S/P knee replacement, bilateral      PLAN:  -pain control  -bowel regimen  urinary retention: remove tavarez catheter tomorrow, then TOV. Continue flomax  -LUE phlebitis: continue keflex  -medicine recs appreciated  -DIOGO: continue CPAP  -DVT prophylaxis: SCDs,  SQ lovenox  -PT/OT/OOB  -incentive spirometer  -hx of gout: start arropurinol  -D/w Dr. Young

## 2017-09-19 NOTE — DIETITIAN INITIAL EVALUATION ADULT. - NS AS NUTRI INTERV ED CONTENT
Purpose of the nutrition education/Nutrition relationship to health/disease/Recommended modifications

## 2017-09-20 ENCOUNTER — TRANSCRIPTION ENCOUNTER (OUTPATIENT)
Age: 72
End: 2017-09-20

## 2017-09-20 VITALS
HEART RATE: 84 BPM | DIASTOLIC BLOOD PRESSURE: 68 MMHG | TEMPERATURE: 98 F | OXYGEN SATURATION: 94 % | SYSTOLIC BLOOD PRESSURE: 124 MMHG | RESPIRATION RATE: 16 BRPM

## 2017-09-20 LAB
ANION GAP SERPL CALC-SCNC: 13 MMOL/L — SIGNIFICANT CHANGE UP (ref 5–17)
BUN SERPL-MCNC: 30 MG/DL — HIGH (ref 7–23)
CALCIUM SERPL-MCNC: 9 MG/DL — SIGNIFICANT CHANGE UP (ref 8.4–10.5)
CHLORIDE SERPL-SCNC: 99 MMOL/L — SIGNIFICANT CHANGE UP (ref 96–108)
CO2 SERPL-SCNC: 26 MMOL/L — SIGNIFICANT CHANGE UP (ref 22–31)
CREAT SERPL-MCNC: 1.12 MG/DL — SIGNIFICANT CHANGE UP (ref 0.5–1.3)
GLUCOSE SERPL-MCNC: 160 MG/DL — HIGH (ref 70–99)
HCT VFR BLD CALC: 33.1 % — LOW (ref 39–50)
HGB BLD-MCNC: 11.4 G/DL — LOW (ref 13–17)
MAGNESIUM SERPL-MCNC: 2.5 MG/DL — SIGNIFICANT CHANGE UP (ref 1.6–2.6)
MCHC RBC-ENTMCNC: 32.1 PG — SIGNIFICANT CHANGE UP (ref 27–34)
MCHC RBC-ENTMCNC: 34.4 G/DL — SIGNIFICANT CHANGE UP (ref 32–36)
MCV RBC AUTO: 93.2 FL — SIGNIFICANT CHANGE UP (ref 80–100)
PHOSPHATE SERPL-MCNC: 4.3 MG/DL — SIGNIFICANT CHANGE UP (ref 2.5–4.5)
PLATELET # BLD AUTO: 237 K/UL — SIGNIFICANT CHANGE UP (ref 150–400)
POTASSIUM SERPL-MCNC: 4 MMOL/L — SIGNIFICANT CHANGE UP (ref 3.5–5.3)
POTASSIUM SERPL-SCNC: 4 MMOL/L — SIGNIFICANT CHANGE UP (ref 3.5–5.3)
RBC # BLD: 3.55 M/UL — LOW (ref 4.2–5.8)
RBC # FLD: 13.8 % — SIGNIFICANT CHANGE UP (ref 10.3–16.9)
SODIUM SERPL-SCNC: 138 MMOL/L — SIGNIFICANT CHANGE UP (ref 135–145)
WBC # BLD: 8 K/UL — SIGNIFICANT CHANGE UP (ref 3.8–10.5)
WBC # FLD AUTO: 8 K/UL — SIGNIFICANT CHANGE UP (ref 3.8–10.5)

## 2017-09-20 PROCEDURE — 99233 SBSQ HOSP IP/OBS HIGH 50: CPT | Mod: GC

## 2017-09-20 RX ORDER — SIMETHICONE 80 MG/1
1 TABLET, CHEWABLE ORAL
Qty: 0 | Refills: 0 | COMMUNITY
Start: 2017-09-20

## 2017-09-20 RX ORDER — DOCUSATE SODIUM 100 MG
1 CAPSULE ORAL
Qty: 0 | Refills: 0 | COMMUNITY
Start: 2017-09-20

## 2017-09-20 RX ORDER — POLYETHYLENE GLYCOL 3350 17 G/17G
17 POWDER, FOR SOLUTION ORAL
Qty: 0 | Refills: 0 | COMMUNITY
Start: 2017-09-20

## 2017-09-20 RX ORDER — ATORVASTATIN CALCIUM 80 MG/1
1 TABLET, FILM COATED ORAL
Qty: 0 | Refills: 0 | COMMUNITY
Start: 2017-09-20

## 2017-09-20 RX ORDER — MAGNESIUM HYDROXIDE 400 MG/1
30 TABLET, CHEWABLE ORAL
Qty: 0 | Refills: 0 | COMMUNITY
Start: 2017-09-20

## 2017-09-20 RX ORDER — ONDANSETRON 8 MG/1
0 TABLET, FILM COATED ORAL
Qty: 0 | Refills: 0 | COMMUNITY
Start: 2017-09-20

## 2017-09-20 RX ORDER — INDOMETHACIN 50 MG
2 CAPSULE ORAL
Qty: 0 | Refills: 0 | COMMUNITY
Start: 2017-09-20

## 2017-09-20 RX ORDER — ACETAMINOPHEN 500 MG
2 TABLET ORAL
Qty: 0 | Refills: 0 | COMMUNITY
Start: 2017-09-20

## 2017-09-20 RX ORDER — HEPARIN SODIUM 5000 [USP'U]/ML
0 INJECTION INTRAVENOUS; SUBCUTANEOUS
Qty: 0 | Refills: 0 | COMMUNITY
Start: 2017-09-20

## 2017-09-20 RX ORDER — TAMSULOSIN HYDROCHLORIDE 0.4 MG/1
1 CAPSULE ORAL
Qty: 0 | Refills: 0 | COMMUNITY
Start: 2017-09-20

## 2017-09-20 RX ORDER — ALLOPURINOL 300 MG
50 TABLET ORAL
Qty: 0 | Refills: 0 | COMMUNITY
Start: 2017-09-20

## 2017-09-20 RX ORDER — HEPARIN SODIUM 5000 [USP'U]/ML
5000 INJECTION INTRAVENOUS; SUBCUTANEOUS EVERY 8 HOURS
Qty: 0 | Refills: 0 | Status: DISCONTINUED | OUTPATIENT
Start: 2017-09-20 | End: 2017-09-20

## 2017-09-20 RX ORDER — CEPHALEXIN 500 MG
1 CAPSULE ORAL
Qty: 0 | Refills: 0 | COMMUNITY
Start: 2017-09-20

## 2017-09-20 RX ORDER — ACETAMINOPHEN 500 MG
2 TABLET ORAL
Qty: 84 | Refills: 0 | OUTPATIENT
Start: 2017-09-20 | End: 2017-10-04

## 2017-09-20 RX ADMIN — Medication 50 MILLIGRAM(S): at 18:00

## 2017-09-20 RX ADMIN — Medication 50 MILLIGRAM(S): at 12:04

## 2017-09-20 RX ADMIN — LOSARTAN POTASSIUM 25 MILLIGRAM(S): 100 TABLET, FILM COATED ORAL at 05:54

## 2017-09-20 RX ADMIN — Medication 50 MILLIGRAM(S): at 17:29

## 2017-09-20 RX ADMIN — HEPARIN SODIUM 5000 UNIT(S): 5000 INJECTION INTRAVENOUS; SUBCUTANEOUS at 14:04

## 2017-09-20 RX ADMIN — Medication 500 MILLIGRAM(S): at 17:29

## 2017-09-20 RX ADMIN — Medication 50 MILLIGRAM(S): at 06:46

## 2017-09-20 RX ADMIN — Medication 100 MILLIGRAM(S): at 12:04

## 2017-09-20 RX ADMIN — Medication 500 MILLIGRAM(S): at 05:54

## 2017-09-20 RX ADMIN — Medication 500 MILLIGRAM(S): at 12:04

## 2017-09-20 RX ADMIN — TAMSULOSIN HYDROCHLORIDE 0.4 MILLIGRAM(S): 0.4 CAPSULE ORAL at 12:04

## 2017-09-20 RX ADMIN — Medication 50 MILLIGRAM(S): at 05:54

## 2017-09-20 NOTE — PROGRESS NOTE ADULT - SUBJECTIVE AND OBJECTIVE BOX
Interval Events: reviewed  Patient seen and examined at bedside.    Patient is a 72y old  Male who presents with a chief complaint of LBP (20 Sep 2017 10:22)    is better. He walked around. If did not tolerate the CPAP mask  PAST MEDICAL & SURGICAL HISTORY:  Hypercholesterolemia  Hypertension  History of hernia repair  S/P hip replacement: right hip  S/P knee replacement, bilateral      MEDICATIONS:  Pulmonary:    Antimicrobials:  cephalexin 500 milliGRAM(s) Oral four times a day    Anticoagulants:  heparin  Injectable 5000 Unit(s) SubCutaneous every 8 hours    Cardiac:  losartan 25 milliGRAM(s) Oral daily  tamsulosin 0.4 milliGRAM(s) Oral daily      Allergies    No Known Allergies    Intolerances        Vital Signs Last 24 Hrs  T(C): 36.4 (20 Sep 2017 15:41), Max: 36.4 (20 Sep 2017 08:54)  T(F): 97.5 (20 Sep 2017 15:41), Max: 97.5 (20 Sep 2017 08:54)  HR: 84 (20 Sep 2017 15:41) (76 - 84)  BP: 124/68 (20 Sep 2017 15:41) (119/75 - 143/84)  BP(mean): --  RR: 16 (20 Sep 2017 15:41) (14 - 16)  SpO2: 94% (20 Sep 2017 15:41) (94% - 99%)    09-19 @ 07:01 - 09-20 @ 07:00  --------------------------------------------------------  IN: 1570 mL / OUT: 1350 mL / NET: 220 mL    09-20 @ 07:01 - 09-20 @ 22:19  --------------------------------------------------------  IN: 1000 mL / OUT: 850 mL / NET: 150 mL          LABS:      CBC Full  -  ( 20 Sep 2017 09:29 )  WBC Count : 8.0 K/uL  Hemoglobin : 11.4 g/dL  Hematocrit : 33.1 %  Platelet Count - Automated : 237 K/uL  Mean Cell Volume : 93.2 fL  Mean Cell Hemoglobin : 32.1 pg  Mean Cell Hemoglobin Concentration : 34.4 g/dL  Auto Neutrophil # : x  Auto Lymphocyte # : x  Auto Monocyte # : x  Auto Eosinophil # : x  Auto Basophil # : x  Auto Neutrophil % : x  Auto Lymphocyte % : x  Auto Monocyte % : x  Auto Eosinophil % : x  Auto Basophil % : x    09-20    138  |  99  |  30<H>  ----------------------------<  160<H>  4.0   |  26  |  1.12    Ca    9.0      20 Sep 2017 09:29  Phos  4.3     09-20  Mg     2.5     09-20                          RADIOLOGY & ADDITIONAL STUDIES (The following images were personally reviewed):  Ivory:                                  No  Urine output:               Yes          DVT prophylaxis:         Yes          Flattus:                          Yes          Bowel movement:              No

## 2017-09-20 NOTE — PROGRESS NOTE ADULT - SUBJECTIVE AND OBJECTIVE BOX
NEUROSURGERY PAIN MANAGEMENT PROGRESS NOTE    incomplete note    O/N EVENTS:  - No acute overnight events, started on CPAP Monday  - Pt started c/o R ankle >L pain yesterday r/t Gout, takes Allopurinol at home, restarted, uric acid WNL    PLAN:      HPI:  Obtained from chart. Pt presents for "pain primarily in the back. He is quite miserable because of it. The pain is severe and unremitting. He has had epidural steroids injections. He cannot walk. He has to stoop forward and rest. He has a cane. He is a  and it is hard for him to participate in his work responsibilities." Patient has failed conservative management and is here for elective L4-5 laminectomy. Pt denies fevers, chills, SOB, CP, urinary/bowel incontinence, saddle anesthesia, acute loss of sensation/weakness of extremities. (14 Sep 2017 11:44)      PAST MEDICAL & SURGICAL HISTORY:  Hypercholesterolemia  Hypertension  History of hernia repair  S/P hip replacement: right hip  S/P knee replacement, bilateral      FAMILY HISTORY:    Allergies  No Known Allergies    Intolerances    PAIN MEDICATIONS:  ondansetron Injectable 4 milliGRAM(s) IV Push Once PRN  acetaminophen   Tablet 650 milliGRAM(s) Oral every 6 hours PRN  acetaminophen  IVPB. 1000 milliGRAM(s) IV Intermittent once PRN  indomethacin 50 milliGRAM(s) Oral every 12 hours    Heme:  enoxaparin Injectable 40 milliGRAM(s) SubCutaneous at bedtime    Antibiotics:  cephalexin 500 milliGRAM(s) Oral four times a day    Cardiovascular:  losartan 25 milliGRAM(s) Oral daily  tamsulosin 0.4 milliGRAM(s) Oral daily    GI:  famotidine    Tablet 20 milliGRAM(s) Oral Once PRN  docusate sodium 100 milliGRAM(s) Oral daily  polyethylene glycol 3350 17 Gram(s) Oral every 12 hours PRN  magnesium hydroxide Suspension 30 milliLiter(s) Oral daily PRN  simethicone 80 milliGRAM(s) Chew daily PRN    Endocrine:  atorvastatin 20 milliGRAM(s) Oral at bedtime  allopurinol 50 milliGRAM(s) Oral daily    All Other Medications:  BUpivacaine liposome 1.3% Injectable (no eMAR) 20 milliLiter(s) Local Injection Once      Vital Signs Last 24 Hrs  T(C): 35.8 (20 Sep 2017 04:19), Max: 38.2 (19 Sep 2017 14:00)  T(F): 96.5 (20 Sep 2017 04:19), Max: 100.8 (19 Sep 2017 14:00)  HR: 78 (20 Sep 2017 04:19) (78 - 106)  BP: 119/75 (20 Sep 2017 04:19) (108/55 - 148/68)  BP(mean): --  RR: 16 (20 Sep 2017 04:19) (14 - 17)  SpO2: 95% (20 Sep 2017 04:19) (93% - 99%)    LABS:                        10.9   10.7  )-----------( 234      ( 19 Sep 2017 12:33 )             33.4     19    136  |  96  |  26<H>  ----------------------------<  106<H>  4.4   |  30  |  1.24    Ca    9.0      19 Sep 2017 12:33  Phos  3.0       Mg     2.4             Urinalysis Basic - ( 18 Sep 2017 16:52 )    Color: Yellow / Appearance: Clear / S.020 / pH: x  Gluc: x / Ketone: NEGATIVE  / Bili: NEGATIVE / Urobili: 0.2 E.U./dL   Blood: x / Protein: NEGATIVE mg/dL / Nitrite: NEGATIVE   Leuk Esterase: NEGATIVE / RBC: 5-10 /HPF / WBC < 5 /HPF   Sq Epi: x / Non Sq Epi: Rare /HPF / Bacteria: Present /HPF    REVIEW OF SYSTEMS:  CONSTITUTIONAL: Fatigued, pt oriented/alert today.   EYES: No eye pain, visual disturbances  ENMT:  No difficulty hearing, tinnitus. No sinus or throat pain  NECK: No pain or stiffness  RESPIRATORY: CPAP, DIOGO--started on trial   CARDIOVASCULAR: No chest pain, palpitations.   NEUROLOGICAL: Incisional back pain--mild. No headaches, memory loss, loss of strength, numbness, or tremors.   MUSCULOSKELETAL: Denies muscle stiffness. No joint pain or swelling. R ankle joint swelling, occurs with gout attacks.    FUNCTIONAL ASSESSMENT:  PAIN SCORE AT REST:    2-3/10    SCALE USED: (1-10 VNRS)  PAIN SCORE WITH ACTIVITY:  8/10  --- R ankle      SCALE USED: (1-10 VNRS)  Comment: Pt reporting new ankle pain    PAIN ASSESSMENT:  Pt c/o severe positional, (when pressure placed on incision) posterior back pain r/t incision. Moderate in severity and only with movement, difficult to find a comfortable position. Denies radic, denies numbness/tingling.    PHYSICAL EXAM  GENERAL: NAD, well-groomed, well-developed  NERVOUS SYSTEM:    Alert & Oriented X4-today to -to time, place, person, situation (previously did not know what surgery he had done)  Cranial nerves grossly intact  Motor Strength 5/5 B/L upper and lower extremities;   Sensation intact to LT in UE/LE in 3 dermatomes  Lumbar: Incisional mild lumbar tenderness. - SLR, - XSLR. Lumbar ROM not assessed, s/p surgery and restricted turning.  CHEST/LUNG: Clear to auscultation bilaterally; No rales, rhonchi, wheezing, or rubs  HEART: Regular rate and rhythm; No murmurs, rubs, or gallops  ABDOMEN: Softly distended, hypoactive BS++    ASSESSMENT:   71yo M w/ c/o back pain s/p L45 lami POD 6  - Gout  - Lumbar stenosis	    PLAN:   1. Opioids  Pt more oriented/aware today. To situation now. Not c/o severe LBP. Stating that it is mild. C/o new onset R foot soreness     PLAN: No IVP medications for now. No opioids for now. IV Tylenol.     2. Neuropathics  Pt currently denies neuropathic pain. No numbness/tingling/electric shock like sensation in LE/UE b.l.    PLAN: No indication for neuropathic agents.     3. Adjuvants  Pt not c/o stiffness this AM, was on Flexeril.      PLAN: Dcd Flexeril. Hold for UR, hold for AMS.    4. Prophylactic:   Bowel regimen: As above.  Nausea PRN: Zofran PRN for Nausea, pt does not c/o current    5. Functional Goals:   Pt will get OOB with PT today. Pt will resume previous level of activity without impairment from surgery.     6. Additional Consults:   None    7. Additional Labs/Imaging:   Uric acid:   Uric Acid, Serum: 8.3 mg/dL (17 @ 12:33)    8. Follow up, Discharge Planning:   Patient is set for discharge to: [facility]  Pending workup NEUROSURGERY PAIN MANAGEMENT PROGRESS NOTE  O/N EVENTS:  - No acute overnight events, started on CPAP Monday  - Pt started c/o R ankle >L pain yesterday r/t Gout, takes Allopurinol at home, restarted, uric acid WNL, indomethacin 50mg BID started    PLAN:  - Continue with indomethacin until symptoms resolve  - IV Tylenol for breakthrough pain, XS Tylenol for Severe Pain    HPI:  Obtained from chart. Pt presents for "pain primarily in the back. He is quite miserable because of it. The pain is severe and unremitting. He has had epidural steroids injections. He cannot walk. He has to stoop forward and rest. He has a cane. He is a  and it is hard for him to participate in his work responsibilities." Patient has failed conservative management and is here for elective L4-5 laminectomy. Pt denies fevers, chills, SOB, CP, urinary/bowel incontinence, saddle anesthesia, acute loss of sensation/weakness of extremities. (14 Sep 2017 11:44)      PAST MEDICAL & SURGICAL HISTORY:  Hypercholesterolemia  Hypertension  History of hernia repair  S/P hip replacement: right hip  S/P knee replacement, bilateral      FAMILY HISTORY:    Allergies  No Known Allergies    Intolerances    PAIN MEDICATIONS:  ondansetron Injectable 4 milliGRAM(s) IV Push Once PRN  acetaminophen   Tablet 650 milliGRAM(s) Oral every 6 hours PRN  acetaminophen  IVPB. 1000 milliGRAM(s) IV Intermittent once PRN  indomethacin 50 milliGRAM(s) Oral every 12 hours    Heme:  enoxaparin Injectable 40 milliGRAM(s) SubCutaneous at bedtime    Antibiotics:  cephalexin 500 milliGRAM(s) Oral four times a day    Cardiovascular:  losartan 25 milliGRAM(s) Oral daily  tamsulosin 0.4 milliGRAM(s) Oral daily    GI:  famotidine    Tablet 20 milliGRAM(s) Oral Once PRN  docusate sodium 100 milliGRAM(s) Oral daily  polyethylene glycol 3350 17 Gram(s) Oral every 12 hours PRN  magnesium hydroxide Suspension 30 milliLiter(s) Oral daily PRN  simethicone 80 milliGRAM(s) Chew daily PRN    Endocrine:  atorvastatin 20 milliGRAM(s) Oral at bedtime  allopurinol 50 milliGRAM(s) Oral daily    All Other Medications:  BUpivacaine liposome 1.3% Injectable (no eMAR) 20 milliLiter(s) Local Injection Once      Vital Signs Last 24 Hrs  T(C): 35.8 (20 Sep 2017 04:19), Max: 38.2 (19 Sep 2017 14:00)  T(F): 96.5 (20 Sep 2017 04:19), Max: 100.8 (19 Sep 2017 14:00)  HR: 78 (20 Sep 2017 04:19) (78 - 106)  BP: 119/75 (20 Sep 2017 04:19) (108/55 - 148/68)  BP(mean): --  RR: 16 (20 Sep 2017 04:19) (14 - 17)  SpO2: 95% (20 Sep 2017 04:19) (93% - 99%)    LABS:                        10.9   10.7  )-----------( 234      ( 19 Sep 2017 12:33 )             33.4     19    136  |  96  |  26<H>  ----------------------------<  106<H>  4.4   |  30  |  1.24    Ca    9.0      19 Sep 2017 12:33  Phos  3.0       Mg     2.4             Urinalysis Basic - ( 18 Sep 2017 16:52 )    Color: Yellow / Appearance: Clear / S.020 / pH: x  Gluc: x / Ketone: NEGATIVE  / Bili: NEGATIVE / Urobili: 0.2 E.U./dL   Blood: x / Protein: NEGATIVE mg/dL / Nitrite: NEGATIVE   Leuk Esterase: NEGATIVE / RBC: 5-10 /HPF / WBC < 5 /HPF   Sq Epi: x / Non Sq Epi: Rare /HPF / Bacteria: Present /HPF    REVIEW OF SYSTEMS:  CONSTITUTIONAL: Awake, in good spirits, pt oriented/alert today.   EYES: No eye pain, visual disturbances  ENMT:  No difficulty hearing, tinnitus. No sinus or throat pain  NECK: No pain or stiffness  RESPIRATORY: CPAP, DIOGO--started on trial --felt like it was smothering him last night, requested dc today  CARDIOVASCULAR: No chest pain, palpitations.   NEUROLOGICAL: Incisional back pain--mild. No headaches, memory loss, loss of strength, numbness, or tremors.   MUSCULOSKELETAL: Denies muscle stiffness. No joint pain or swelling. Sig. improvement in ankle joint swelling, occurs with gout attacks, indomethacin started.    FUNCTIONAL ASSESSMENT:  PAIN SCORE AT REST:    2-3/10    SCALE USED: (1-10 VNRS)  PAIN SCORE WITH ACTIVITY:  Denies sig. increase.       SCALE USED: (1-10 VNRS)  Comment: Pt reporting new ankle pain has resolved.    PAIN ASSESSMENT:  Pt c/o mild positional, (when pressure placed on incision) posterior back pain r/t incision. Pt states that he is very comfortable this AM. Denies R ankle pain. Denies radic pain, stiffness.     PHYSICAL EXAM  GENERAL: NAD, elderly male resting in bed  NERVOUS SYSTEM:    Alert & Oriented X4-today to -to time, place, person, situation (previously did not know what surgery he had done)  Cranial nerves grossly intact  Motor Strength 5/5 B/L upper and lower extremities;   Sensation intact to LT in UE/LE in 3 dermatomes  Lumbar: Very mild lumbar tenderness. - SLR, - XSLR. Lumbar ROM not assessed, s/p surgery and restricted turning.  CHEST/LUNG: Clear to auscultation bilaterally; No rales, rhonchi, wheezing, or rubs  HEART: Regular rate and rhythm; No murmurs, rubs, or gallops  ABDOMEN: Softly distended, hypoactive BS++  EXTREMITIES: No inflammation of b/l ankles this AM. Nontender to touch, previously L ankle 8/10 pain.    ASSESSMENT:   73yo M w/ c/o back pain s/p L45 lami POD 6  - Gout  - Lumbar stenosis	    PLAN:   1. Opioids  Pt more oriented/aware today. To situation now. Not c/o severe LBP. Stating that it is mild. C/o new onset R foot soreness     PLAN: No IVP medications for now. No opioids for now. IV Tylenol. XS Tylenol for Pain on dc to [facility].    2. Neuropathics  Pt currently denies neuropathic pain. No numbness/tingling/electric shock like sensation in LE/UE b.l.    PLAN: No indication for neuropathic agents.     3. Adjuvants  Pt not c/o stiffness this AM, was on Flexeril.      PLAN: Dcd Flexeril. Hold for UR, hold for AMS. C/w Indomethacin until s/sx madina. Can likely dc tomorrow.     4. Prophylactic:   Bowel regimen: As above.  Nausea PRN: Zofran PRN for Nausea, pt does not c/o current    5. Functional Goals:   Pt will get OOB with PT today. Pt will resume previous level of activity without impairment from surgery.     6. Additional Consults:   None    7. Additional Labs/Imaging:   Uric acid:  Uric Acid, Serum: 8.3 mg/dL (17 @ 12:33)    8. Follow up, Discharge Planning:   Patient is set for discharge to: [facility]  Dc today   No req. for f/u outpatient, no opioids currently, PCP follow up for pain control

## 2017-09-20 NOTE — DISCHARGE NOTE ADULT - PATIENT PORTAL LINK FT
“You can access the [employer] Patient Portal, offered by [facility], by registering with the following website: [email or web address]”

## 2017-09-20 NOTE — DISCHARGE NOTE ADULT - PLAN OF CARE
Return to previous level of activity with improved mobility unlimited by back pain Wound care:  - Keep your wound clean and dry.   - Daily, ask someone to check your incision for signs of infection such as: redness, swelling, tenderness, drainage (excessive, increased more than before you were discharged)  - Only cover the wound if it is draining a little bit of red/pink blood during the first couple of days. If the drainage looks yellow or green, call your doctor immediately.    Activity:  - Avoid bending, twisting or heavy lifting.  - Do not sit for more than 20 minutes each time you sit. Do not wear pants that are tight on your incision.  - Use stairs as tolerated.  - Shower briefly, within 24 hours of getting home. No tub baths or swimming for 2 weeks    Medications:  - No NSAIDs (Ibuprofen, Alleve, Advil, Motrin, Naproxen, Celebrex, etc.), consult your doctor for when it is okay to start using them again.   - Take tylenol for pain control. You were on opioids during your hospital stay and were clouded and not completely oriented. We took you off of them and your state improved. For now we would not recommend restarting.  Call you doctor immediately if you have:  - New numbness, tingling, or weakness in your arms and legs.  - Worsening pain not helped with pain meds, Fever of 101° F or more.  - You must call discharge to make a follow- up appointment with your doctor, please call: [phone]. No desaturation episodes while inpatient Follow up with Dr. Moreno's office for sleep study, you were diagnosed with DIOGO, and we started you on a CPAP machine.

## 2017-09-20 NOTE — PROGRESS NOTE ADULT - SUBJECTIVE AND OBJECTIVE BOX
HPI:  Obtained from chart. Pt presents for "pain primarily in the back. He is quite miserable because of it. The pain is severe and unremitting. He has had epidural steroids injections. He cannot walk. He has to stoop forward and rest. He has a cane. He is a  and it is hard for him to participate in his work responsibilities." Patient has failed conservative management and is here for elective L4-5 laminectomy. Pt denies fevers, chills, SOB, CP, urinary/bowel incontinence, saddle anesthesia, acute loss of sensation/weakness of extremities. (14 Sep 2017 11:44)    OVERNIGHT EVENTS:  Pt c/o some LBP. Pt  Pt with tavarez in place. Denies leg pain. States gout is improving.     Vital Signs Last 24 Hrs  T(C): 36.4 (20 Sep 2017 08:54), Max: 38.2 (19 Sep 2017 14:00)  T(F): 97.5 (20 Sep 2017 08:54), Max: 100.8 (19 Sep 2017 14:00)  HR: 80 (20 Sep 2017 09:49) (76 - 104)  BP: 143/84 (20 Sep 2017 08:54) (108/55 - 148/68)  BP(mean): --  RR: 15 (20 Sep 2017 08:54) (14 - 17)  SpO2: 94% (20 Sep 2017 09:49) (93% - 99%)    I&O's Summary    19 Sep 2017 07:01  -  20 Sep 2017 07:00  --------------------------------------------------------  IN: 1570 mL / OUT: 1350 mL / NET: 220 mL        PHYSICAL EXAM:  Neurological:  LACY w/o focal deficit  sensory intact  phlebitis site improving  Incision/Wound:c/d/i staples in place    TUBES/LINES:  [x] Tavarez  [] Lumbar Drain  [] Wound Drains  [] Others      DIET:  [] NPO  [x] Mechanical  [] Tube feeds    LABS:                        11.4   8.0   )-----------( 237      ( 20 Sep 2017 09:29 )             33.1     09-20    138  |  99  |  30<H>  ----------------------------<  160<H>  4.0   |  26  |  1.12    Ca    9.0      20 Sep 2017 09:29  Phos  4.3       Mg     2.5             Urinalysis Basic - ( 18 Sep 2017 16:52 )    Color: Yellow / Appearance: Clear / S.020 / pH: x  Gluc: x / Ketone: NEGATIVE  / Bili: NEGATIVE / Urobili: 0.2 E.U./dL   Blood: x / Protein: NEGATIVE mg/dL / Nitrite: NEGATIVE   Leuk Esterase: NEGATIVE / RBC: 5-10 /HPF / WBC < 5 /HPF   Sq Epi: x / Non Sq Epi: Rare /HPF / Bacteria: Present /HPF          CAPILLARY BLOOD GLUCOSE      Drug Levels: [] N/A    CSF Analysis: [] N/A      Allergies    No Known Allergies    Intolerances      MEDICATIONS:  Antibiotics:  cephalexin 500 milliGRAM(s) Oral four times a day    Neuro:  ondansetron Injectable 4 milliGRAM(s) IV Push Once PRN  acetaminophen   Tablet 650 milliGRAM(s) Oral every 6 hours PRN  acetaminophen  IVPB. 1000 milliGRAM(s) IV Intermittent once PRN  indomethacin 50 milliGRAM(s) Oral every 12 hours    Anticoagulation:  heparin  Injectable 5000 Unit(s) SubCutaneous every 8 hours    OTHER:  BUpivacaine liposome 1.3% Injectable (no eMAR) 20 milliLiter(s) Local Injection Once  famotidine    Tablet 20 milliGRAM(s) Oral Once PRN  losartan 25 milliGRAM(s) Oral daily  atorvastatin 20 milliGRAM(s) Oral at bedtime  docusate sodium 100 milliGRAM(s) Oral daily  polyethylene glycol 3350 17 Gram(s) Oral every 12 hours PRN  magnesium hydroxide Suspension 30 milliLiter(s) Oral daily PRN  simethicone 80 milliGRAM(s) Chew daily PRN  tamsulosin 0.4 milliGRAM(s) Oral daily  allopurinol 50 milliGRAM(s) Oral daily    IVF:    CULTURES:  Culture - Blood (17 @ 16:06)    Specimen Source: .Blood Blood    Culture Results:   No growth at 2 days.    Culture - Blood (17 @ 16:06)    Specimen Source: .Blood Blood    Culture Results:   No growth at 2 days.      RADIOLOGY & ADDITIONAL TESTS:  < from: Xray Chest 1 View AP -PORTABLE-Routine (17 @ 16:36) >  Unremarkable    < end of copied text >      ASSESSMENT:  72y Male s/p L4-L5 laminectomy on  Post op course complicated by phlebitis, gout , urinary retention    M43.10  No h/o HF  Family history of coronary artery disease  Handoff  MEWS Score  Hypercholesterolemia  Hypertension  Lumbar spinal stenosis  Spinal stenosis of lumbar region  Lumbar spine surgery  Lumbar stenosis  Phlebitis  Urinary retention  Hypertension  Hypercholesterolemia  Obstructive sleep apnea  Fever  Lumbar spine surgery  History of hernia repair  S/P hip replacement  S/P knee replacement, bilateral  Obstructive sleep apnea      PLAN:  -void trail  -gout treatment. Allopurinol  -keflex for phlebitis. improving  -encourage IS and OOB  -PT  -SW for rehab placement  -D/W         DVT PROPHYLAXIS:  [x] Venodynes                                [x] Heparin/Lovenox    DISPOSITION:

## 2017-09-20 NOTE — PROGRESS NOTE ADULT - PROVIDER SPECIALTY LIST ADULT
Internal Medicine
Neurosurgery
Pain Medicine
Pain Medicine
Neurosurgery
Neurosurgery
Pain Medicine
Pain Medicine
Internal Medicine

## 2017-09-20 NOTE — DISCHARGE NOTE ADULT - CARE PLAN
Principal Discharge DX:	Lumbar stenosis  Goal:	Return to previous level of activity with improved mobility unlimited by back pain  Instructions for follow-up, activity and diet:	Wound care:  - Keep your wound clean and dry.   - Daily, ask someone to check your incision for signs of infection such as: redness, swelling, tenderness, drainage (excessive, increased more than before you were discharged)  - Only cover the wound if it is draining a little bit of red/pink blood during the first couple of days. If the drainage looks yellow or green, call your doctor immediately.    Activity:  - Avoid bending, twisting or heavy lifting.  - Do not sit for more than 20 minutes each time you sit. Do not wear pants that are tight on your incision.  - Use stairs as tolerated.  - Shower briefly, within 24 hours of getting home. No tub baths or swimming for 2 weeks    Medications:  - No NSAIDs (Ibuprofen, Alleve, Advil, Motrin, Naproxen, Celebrex, etc.), consult your doctor for when it is okay to start using them again.   - Take tylenol for pain control. You were on opioids during your hospital stay and were clouded and not completely oriented. We took you off of them and your state improved. For now we would not recommend restarting.  Call you doctor immediately if you have:  - New numbness, tingling, or weakness in your arms and legs.  - Worsening pain not helped with pain meds, Fever of 101° F or more.  - You must call discharge to make a follow- up appointment with your doctor, please call: [phone].  Secondary Diagnosis:	Obstructive sleep apnea  Goal:	No desaturation episodes while inpatient  Instructions for follow-up, activity and diet:	Follow up with Dr. Moreno's office for sleep study, you were diagnosed with DIOGO, and we started you on a CPAP machine.

## 2017-09-20 NOTE — DISCHARGE NOTE ADULT - CARE PROVIDER_API CALL
Cass Moreno), Critical Care Medicine; Pulmonary Disease  [address] [address]  Phone: [phone]  Fax: [phone]    Nic Young), Neurological Surgery  [address] [address]  Phone: [phone]  Fax: [phone]

## 2017-09-20 NOTE — PROGRESS NOTE ADULT - PROBLEM SELECTOR PLAN 2
he improved on the cpap and I discussed with him his condition and need for sleep apnea test.  Avoid oversedation
I indicated to the patient and need to follow a sleep study. He has symptoms and sign consistent with obstructive sleep apnea He refused the CPAP mask

## 2017-09-20 NOTE — DISCHARGE NOTE ADULT - HOSPITAL COURSE
73yo M w/ c/o severe LBP that was severe and unremitting. Pt had failed EPSI. Pt had difficulties ambulating on outpatient consultation with a stooped forward posture and need to rest after short periods of ambulation.Pt had failed conservative management and presented this admission for elective L4-5 laminectomy. He denied fevers, chills, SOB, CP, urinary/bowel incontinence, saddle anesthesia, acute loss of sensation/weakness of extremities. Pt course was complicated by urinary retention, fevers, apeneic episodes, delirium. Pt had R arm phlebitis, started on Keflex. Pt had apneic episodes witnessed on admission and was started on CPAP, with plan to follow up with sleep studies outpatient. Pts opioids were discontinued on 9/18 and orientation returned to baseline, previously did not recall the indication for his hospitalization and was perseverating. Pt is stable for discharge today to [facility]. Pt still has a Ivory from urinary retention. Pt should have a voiding trail later today. 71yo M w/ c/o severe LBP that was severe and unremitting. Pt had failed EPSI. Pt had difficulties ambulating on outpatient consultation with a stooped forward posture and need to rest after short periods of ambulation.Pt had failed conservative management and presented this admission for elective L4-5 laminectomy. He denied fevers, chills, SOB, CP, urinary/bowel incontinence, saddle anesthesia, acute loss of sensation/weakness of extremities. Pt course was complicated by urinary retention, fevers, apeneic episodes, delirium. Pt had R arm phlebitis, started on Keflex. Pt had apneic episodes witnessed on admission and was started on CPAP, with plan to follow up with sleep studies outpatient. Pts opioids were discontinued on 9/18 and orientation returned to baseline, previously did not recall the indication for his hospitalization and was perseverating. Pt is stable for discharge today to [facility]. Pt had Tavaerz removed today 9/20 in PM for urinary retention and trial of void. Pt still has not urinated on discharge. Confirmed with [facility] rehab that they will continue to manage and reinsert tavarez if necessary.

## 2017-09-20 NOTE — DISCHARGE NOTE ADULT - MEDICATION SUMMARY - MEDICATIONS TO CHANGE
I will SWITCH the dose or number of times a day I take the medications listed below when I get home from the hospital:    Crestor 5 mg oral tablet  --  by mouth

## 2017-09-20 NOTE — DISCHARGE NOTE ADULT - NS AS ACTIVITY OBS
No Heavy lifting/straining/Walking-Outdoors allowed/Do not drive or operate machinery/Walking-Indoors allowed/Showering allowed/Do not make important decisions

## 2017-09-20 NOTE — DISCHARGE NOTE ADULT - MEDICATION SUMMARY - MEDICATIONS TO TAKE
I will START or STAY ON the medications listed below when I get home from the hospital:    acetaminophen 325 mg oral tablet  -- 2 tab(s) by mouth every 6 hours, As needed, For Temp greater than 38.5 C (101.3 F)  -- Indication: For Fever    indomethacin 25 mg oral capsule  -- 2 cap(s) by mouth every 12 hours  -- Indication: For Gout flare, until symptoms resolve    losartan 25 mg oral tablet  --  by mouth   -- Indication: For Hypertension    magnesium hydroxide 8% oral suspension  -- 30 milliliter(s) by mouth once a day, As needed, Constipation  -- Indication: For Constipation    tamsulosin 0.4 mg oral capsule  -- 1 cap(s) by mouth once a day  -- Indication: For Urinary retention    heparin  -- Indication: For Blood clot prevention    ondansetron 2 mg/mL injectable solution  --  injectable   -- Indication: For Nausea    allopurinol  -- 50 milligram(s) by mouth once a day  -- Indication: For Gout, home dose    atorvastatin 20 mg oral tablet  -- 1 tab(s) by mouth once a day (at bedtime)  -- Indication: For Hypercholesterolemia    cephalexin 500 mg oral capsule  -- 1 cap(s) by mouth 4 times a day  -- Indication: For Phlebitis    polyethylene glycol 3350 oral powder for reconstitution  -- 17 gram(s) by mouth every 12 hours, As needed, Constipation  -- Indication: For Constipation    docusate sodium 100 mg oral capsule  -- 1 cap(s) by mouth once a day  -- Indication: For Constipation    simethicone 80 mg oral tablet, chewable  -- 1 tab(s) by mouth once a day, As needed, Gas  -- Indication: For Gas

## 2017-09-22 DIAGNOSIS — Z96.641 PRESENCE OF RIGHT ARTIFICIAL HIP JOINT: ICD-10-CM

## 2017-09-22 DIAGNOSIS — I10 ESSENTIAL (PRIMARY) HYPERTENSION: ICD-10-CM

## 2017-09-22 DIAGNOSIS — M48.06 SPINAL STENOSIS, LUMBAR REGION: ICD-10-CM

## 2017-09-22 DIAGNOSIS — E78.00 PURE HYPERCHOLESTEROLEMIA, UNSPECIFIED: ICD-10-CM

## 2017-09-22 DIAGNOSIS — Z96.653 PRESENCE OF ARTIFICIAL KNEE JOINT, BILATERAL: ICD-10-CM

## 2017-09-22 DIAGNOSIS — M54.9 DORSALGIA, UNSPECIFIED: ICD-10-CM

## 2017-09-22 LAB
CULTURE RESULTS: SIGNIFICANT CHANGE UP
CULTURE RESULTS: SIGNIFICANT CHANGE UP
SPECIMEN SOURCE: SIGNIFICANT CHANGE UP
SPECIMEN SOURCE: SIGNIFICANT CHANGE UP

## 2017-11-02 DIAGNOSIS — G89.18 OTHER ACUTE POSTPROCEDURAL PAIN: ICD-10-CM

## 2017-11-21 ENCOUNTER — APPOINTMENT (OUTPATIENT)
Dept: SPINE | Facility: CLINIC | Age: 72
End: 2017-11-21
Payer: MEDICARE

## 2017-11-28 ENCOUNTER — APPOINTMENT (OUTPATIENT)
Dept: SPINE | Facility: CLINIC | Age: 72
End: 2017-11-28
Payer: MEDICARE

## 2017-11-28 VITALS
HEIGHT: 74 IN | OXYGEN SATURATION: 95 % | BODY MASS INDEX: 34.65 KG/M2 | SYSTOLIC BLOOD PRESSURE: 153 MMHG | WEIGHT: 270 LBS | HEART RATE: 92 BPM | DIASTOLIC BLOOD PRESSURE: 87 MMHG

## 2017-11-28 PROCEDURE — 99024 POSTOP FOLLOW-UP VISIT: CPT

## 2018-01-29 ENCOUNTER — FORM ENCOUNTER (OUTPATIENT)
Age: 73
End: 2018-01-29

## 2018-01-30 ENCOUNTER — APPOINTMENT (OUTPATIENT)
Dept: SPINE | Facility: CLINIC | Age: 73
End: 2018-01-30
Payer: MEDICARE

## 2018-01-30 ENCOUNTER — OUTPATIENT (OUTPATIENT)
Dept: OUTPATIENT SERVICES | Facility: HOSPITAL | Age: 73
LOS: 1 days | End: 2018-01-30
Payer: MEDICARE

## 2018-01-30 VITALS
SYSTOLIC BLOOD PRESSURE: 137 MMHG | OXYGEN SATURATION: 91 % | HEART RATE: 87 BPM | DIASTOLIC BLOOD PRESSURE: 77 MMHG | HEIGHT: 74 IN | BODY MASS INDEX: 36.7 KG/M2 | WEIGHT: 286 LBS

## 2018-01-30 DIAGNOSIS — Z96.60 PRESENCE OF UNSPECIFIED ORTHOPEDIC JOINT IMPLANT: Chronic | ICD-10-CM

## 2018-01-30 DIAGNOSIS — Z98.890 OTHER SPECIFIED POSTPROCEDURAL STATES: Chronic | ICD-10-CM

## 2018-01-30 DIAGNOSIS — Z96.653 PRESENCE OF ARTIFICIAL KNEE JOINT, BILATERAL: Chronic | ICD-10-CM

## 2018-01-30 PROCEDURE — 72110 X-RAY EXAM L-2 SPINE 4/>VWS: CPT | Mod: 26

## 2018-01-30 PROCEDURE — 99024 POSTOP FOLLOW-UP VISIT: CPT

## 2018-01-30 PROCEDURE — 72110 X-RAY EXAM L-2 SPINE 4/>VWS: CPT

## 2018-04-17 ENCOUNTER — FORM ENCOUNTER (OUTPATIENT)
Age: 73
End: 2018-04-17

## 2018-04-18 ENCOUNTER — OUTPATIENT (OUTPATIENT)
Dept: OUTPATIENT SERVICES | Facility: HOSPITAL | Age: 73
LOS: 1 days | End: 2018-04-18
Payer: MEDICARE

## 2018-04-18 ENCOUNTER — APPOINTMENT (OUTPATIENT)
Dept: SPINE | Facility: CLINIC | Age: 73
End: 2018-04-18
Payer: MEDICARE

## 2018-04-18 VITALS
TEMPERATURE: 98 F | OXYGEN SATURATION: 95 % | BODY MASS INDEX: 37.22 KG/M2 | RESPIRATION RATE: 16 BRPM | WEIGHT: 290 LBS | DIASTOLIC BLOOD PRESSURE: 80 MMHG | HEIGHT: 74 IN | HEART RATE: 92 BPM | SYSTOLIC BLOOD PRESSURE: 157 MMHG

## 2018-04-18 DIAGNOSIS — Z98.890 OTHER SPECIFIED POSTPROCEDURAL STATES: Chronic | ICD-10-CM

## 2018-04-18 DIAGNOSIS — M54.5 LOW BACK PAIN: ICD-10-CM

## 2018-04-18 DIAGNOSIS — Z96.653 PRESENCE OF ARTIFICIAL KNEE JOINT, BILATERAL: Chronic | ICD-10-CM

## 2018-04-18 DIAGNOSIS — Z96.60 PRESENCE OF UNSPECIFIED ORTHOPEDIC JOINT IMPLANT: Chronic | ICD-10-CM

## 2018-04-18 PROCEDURE — 99214 OFFICE O/P EST MOD 30 MIN: CPT

## 2018-04-18 PROCEDURE — 72110 X-RAY EXAM L-2 SPINE 4/>VWS: CPT

## 2018-04-18 PROCEDURE — 72110 X-RAY EXAM L-2 SPINE 4/>VWS: CPT | Mod: 26

## 2018-05-22 ENCOUNTER — FORM ENCOUNTER (OUTPATIENT)
Age: 73
End: 2018-05-22

## 2018-05-23 ENCOUNTER — APPOINTMENT (OUTPATIENT)
Dept: SPINE | Facility: CLINIC | Age: 73
End: 2018-05-23
Payer: MEDICARE

## 2018-05-23 ENCOUNTER — OUTPATIENT (OUTPATIENT)
Dept: OUTPATIENT SERVICES | Facility: HOSPITAL | Age: 73
LOS: 1 days | End: 2018-05-23
Payer: MEDICARE

## 2018-05-23 VITALS
HEIGHT: 74 IN | BODY MASS INDEX: 37.99 KG/M2 | OXYGEN SATURATION: 97 % | SYSTOLIC BLOOD PRESSURE: 169 MMHG | TEMPERATURE: 97.4 F | WEIGHT: 296 LBS | RESPIRATION RATE: 18 BRPM | HEART RATE: 80 BPM | DIASTOLIC BLOOD PRESSURE: 89 MMHG

## 2018-05-23 DIAGNOSIS — Z96.653 PRESENCE OF ARTIFICIAL KNEE JOINT, BILATERAL: Chronic | ICD-10-CM

## 2018-05-23 DIAGNOSIS — Z98.890 OTHER SPECIFIED POSTPROCEDURAL STATES: Chronic | ICD-10-CM

## 2018-05-23 DIAGNOSIS — Z96.60 PRESENCE OF UNSPECIFIED ORTHOPEDIC JOINT IMPLANT: Chronic | ICD-10-CM

## 2018-05-23 DIAGNOSIS — M43.10 SPONDYLOLISTHESIS, SITE UNSPECIFIED: ICD-10-CM

## 2018-05-23 PROCEDURE — 72082 X-RAY EXAM ENTIRE SPI 2/3 VW: CPT

## 2018-05-23 PROCEDURE — 99214 OFFICE O/P EST MOD 30 MIN: CPT

## 2018-05-23 PROCEDURE — 72082 X-RAY EXAM ENTIRE SPI 2/3 VW: CPT | Mod: 26

## 2018-05-25 PROBLEM — M43.10 ANTEROLISTHESIS: Status: ACTIVE | Noted: 2017-08-09

## 2018-06-28 VITALS
OXYGEN SATURATION: 94 % | HEART RATE: 92 BPM | TEMPERATURE: 98 F | SYSTOLIC BLOOD PRESSURE: 146 MMHG | HEIGHT: 74 IN | RESPIRATION RATE: 18 BRPM | DIASTOLIC BLOOD PRESSURE: 77 MMHG | WEIGHT: 291.01 LBS

## 2018-06-28 NOTE — PRE-OP CHECKLIST - 1.
pt has ring on left wedding finger that can not be removed, pt refused to cut, anesthesia and supervisor aware

## 2018-06-28 NOTE — PATIENT PROFILE ADULT. - PMH
Hypercholesterolemia    Hypertension Hypercholesterolemia    Hypertension    PAD (peripheral artery disease)

## 2018-06-28 NOTE — PATIENT PROFILE ADULT. - PSH
History of hernia repair    S/P hip replacement  right hip  S/P knee replacement, bilateral H/O laminectomy    History of hernia repair    S/P hip replacement  right hip  S/P knee replacement, bilateral

## 2018-06-28 NOTE — PATIENT PROFILE ADULT. - REASON FOR ADMISSION
Sagittal Plane Imbalance/T10 pelvis fusion Sagittal Plane Imbalance/T10 pelvis fusion instrumentation

## 2018-06-29 ENCOUNTER — APPOINTMENT (OUTPATIENT)
Dept: SPINE | Facility: HOSPITAL | Age: 73
End: 2018-06-29
Payer: MEDICARE

## 2018-06-29 ENCOUNTER — INPATIENT (INPATIENT)
Facility: HOSPITAL | Age: 73
LOS: 11 days | Discharge: NURSING, REHAB OR LONG-TERM CARE | DRG: 457 | End: 2018-07-11
Attending: NEUROLOGICAL SURGERY | Admitting: NEUROLOGICAL SURGERY
Payer: MEDICARE

## 2018-06-29 DIAGNOSIS — Z98.890 OTHER SPECIFIED POSTPROCEDURAL STATES: Chronic | ICD-10-CM

## 2018-06-29 DIAGNOSIS — Z96.653 PRESENCE OF ARTIFICIAL KNEE JOINT, BILATERAL: Chronic | ICD-10-CM

## 2018-06-29 DIAGNOSIS — Z96.60 PRESENCE OF UNSPECIFIED ORTHOPEDIC JOINT IMPLANT: Chronic | ICD-10-CM

## 2018-06-29 LAB
ANION GAP SERPL CALC-SCNC: 15 MMOL/L — SIGNIFICANT CHANGE UP (ref 5–17)
BASE EXCESS BLDA CALC-SCNC: -2.4 MMOL/L — LOW (ref -2–3)
BASE EXCESS BLDA CALC-SCNC: -2.7 MMOL/L — LOW (ref -2–3)
BASE EXCESS BLDA CALC-SCNC: -3.2 MMOL/L — LOW (ref -2–3)
BUN SERPL-MCNC: 21 MG/DL — SIGNIFICANT CHANGE UP (ref 7–23)
CA-I BLDA-SCNC: 1.01 MMOL/L — LOW (ref 1.12–1.3)
CA-I BLDA-SCNC: 1.01 MMOL/L — LOW (ref 1.12–1.3)
CA-I BLDA-SCNC: 1.08 MMOL/L — LOW (ref 1.12–1.3)
CALCIUM SERPL-MCNC: 8 MG/DL — LOW (ref 8.4–10.5)
CHLORIDE SERPL-SCNC: 103 MMOL/L — SIGNIFICANT CHANGE UP (ref 96–108)
CO2 SERPL-SCNC: 22 MMOL/L — SIGNIFICANT CHANGE UP (ref 22–31)
COHGB MFR BLDA: 0.2 % — SIGNIFICANT CHANGE UP
COHGB MFR BLDA: 0.3 % — SIGNIFICANT CHANGE UP
COHGB MFR BLDA: 0.3 % — SIGNIFICANT CHANGE UP
CREAT SERPL-MCNC: 1.11 MG/DL — SIGNIFICANT CHANGE UP (ref 0.5–1.3)
GAS PNL BLDA: SIGNIFICANT CHANGE UP
GLUCOSE BLDC GLUCOMTR-MCNC: 159 MG/DL — HIGH (ref 70–99)
GLUCOSE BLDC GLUCOMTR-MCNC: 162 MG/DL — HIGH (ref 70–99)
GLUCOSE BLDC GLUCOMTR-MCNC: 186 MG/DL — HIGH (ref 70–99)
GLUCOSE BLDC GLUCOMTR-MCNC: 208 MG/DL — HIGH (ref 70–99)
GLUCOSE SERPL-MCNC: 203 MG/DL — HIGH (ref 70–99)
HCO3 BLDA-SCNC: 21 MMOL/L — SIGNIFICANT CHANGE UP (ref 21–28)
HCO3 BLDA-SCNC: 22 MMOL/L — SIGNIFICANT CHANGE UP (ref 21–28)
HCO3 BLDA-SCNC: 22 MMOL/L — SIGNIFICANT CHANGE UP (ref 21–28)
HCT VFR BLD CALC: 25 % — LOW (ref 39–50)
HGB BLD-MCNC: 8.5 G/DL — LOW (ref 13–17)
HGB BLDA-MCNC: 10.5 G/DL — LOW (ref 13–17)
HGB BLDA-MCNC: 11.4 G/DL — LOW (ref 13–17)
HGB BLDA-MCNC: 12.2 G/DL — LOW (ref 13–17)
LYMPHOCYTES # BLD AUTO: 5.5 % — LOW (ref 13–44)
MCHC RBC-ENTMCNC: 31.3 PG — SIGNIFICANT CHANGE UP (ref 27–34)
MCHC RBC-ENTMCNC: 34 G/DL — SIGNIFICANT CHANGE UP (ref 32–36)
MCV RBC AUTO: 91.9 FL — SIGNIFICANT CHANGE UP (ref 80–100)
METHGB MFR BLDA: 0 % — SIGNIFICANT CHANGE UP
METHGB MFR BLDA: 0.5 % — SIGNIFICANT CHANGE UP
METHGB MFR BLDA: 0.5 % — SIGNIFICANT CHANGE UP
MONOCYTES NFR BLD AUTO: 6.5 % — SIGNIFICANT CHANGE UP (ref 2–14)
NEUTROPHILS NFR BLD AUTO: 88 % — HIGH (ref 43–77)
O2 CT VFR BLDA CALC: 15.8 ML/DL — SIGNIFICANT CHANGE UP (ref 15–23)
O2 CT VFR BLDA CALC: 18.3 ML/DL — SIGNIFICANT CHANGE UP (ref 15–23)
O2 CT VFR BLDA CALC: SIGNIFICANT CHANGE UP (ref 15–23)
OXYHGB MFR BLDA: 98 % — SIGNIFICANT CHANGE UP (ref 94–100)
OXYHGB MFR BLDA: 99 % — SIGNIFICANT CHANGE UP (ref 94–100)
OXYHGB MFR BLDA: 99 % — SIGNIFICANT CHANGE UP (ref 94–100)
PCO2 BLDA: 35 MMHG — SIGNIFICANT CHANGE UP (ref 35–48)
PCO2 BLDA: 35 MMHG — SIGNIFICANT CHANGE UP (ref 35–48)
PCO2 BLDA: 36 MMHG — SIGNIFICANT CHANGE UP (ref 35–48)
PH BLDA: 7.39 — SIGNIFICANT CHANGE UP (ref 7.35–7.45)
PH BLDA: 7.4 — SIGNIFICANT CHANGE UP (ref 7.35–7.45)
PH BLDA: 7.41 — SIGNIFICANT CHANGE UP (ref 7.35–7.45)
PLATELET # BLD AUTO: 151 K/UL — SIGNIFICANT CHANGE UP (ref 150–400)
PO2 BLDA: 448 MMHG — HIGH (ref 83–108)
PO2 BLDA: 450 MMHG — HIGH (ref 83–108)
PO2 BLDA: 494 MMHG — HIGH (ref 83–108)
POTASSIUM BLDA-SCNC: 4.5 MMOL/L — SIGNIFICANT CHANGE UP (ref 3.5–4.9)
POTASSIUM BLDA-SCNC: 4.6 MMOL/L — SIGNIFICANT CHANGE UP (ref 3.5–4.9)
POTASSIUM BLDA-SCNC: 4.8 MMOL/L — SIGNIFICANT CHANGE UP (ref 3.5–4.9)
POTASSIUM SERPL-MCNC: 4.4 MMOL/L — SIGNIFICANT CHANGE UP (ref 3.5–5.3)
POTASSIUM SERPL-SCNC: 4.4 MMOL/L — SIGNIFICANT CHANGE UP (ref 3.5–5.3)
RBC # BLD: 2.72 M/UL — LOW (ref 4.2–5.8)
RBC # FLD: 13.1 % — SIGNIFICANT CHANGE UP (ref 10.3–16.9)
SAO2 % BLDA: 100 % — SIGNIFICANT CHANGE UP (ref 95–100)
SAO2 % BLDA: 99 % — SIGNIFICANT CHANGE UP (ref 95–100)
SAO2 % BLDA: 99 % — SIGNIFICANT CHANGE UP (ref 95–100)
SODIUM BLDA-SCNC: 137 MMOL/L — LOW (ref 138–146)
SODIUM BLDA-SCNC: 137 MMOL/L — LOW (ref 138–146)
SODIUM BLDA-SCNC: 138 MMOL/L — SIGNIFICANT CHANGE UP (ref 138–146)
SODIUM SERPL-SCNC: 140 MMOL/L — SIGNIFICANT CHANGE UP (ref 135–145)
WBC # BLD: 9.2 K/UL — SIGNIFICANT CHANGE UP (ref 3.8–10.5)
WBC # FLD AUTO: 9.2 K/UL — SIGNIFICANT CHANGE UP (ref 3.8–10.5)

## 2018-06-29 PROCEDURE — 85025 COMPLETE CBC W/AUTO DIFF WBC: CPT

## 2018-06-29 PROCEDURE — 86901 BLOOD TYPING SEROLOGIC RH(D): CPT

## 2018-06-29 PROCEDURE — 83735 ASSAY OF MAGNESIUM: CPT

## 2018-06-29 PROCEDURE — 22848 INSERT PELV FIXATION DEVICE: CPT | Mod: 80

## 2018-06-29 PROCEDURE — 76000 FLUOROSCOPY <1 HR PHYS/QHP: CPT

## 2018-06-29 PROCEDURE — 72070 X-RAY EXAM THORAC SPINE 2VWS: CPT | Mod: 26

## 2018-06-29 PROCEDURE — 86900 BLOOD TYPING SEROLOGIC ABO: CPT

## 2018-06-29 PROCEDURE — 22848 INSERT PELV FIXATION DEVICE: CPT

## 2018-06-29 PROCEDURE — C1889: CPT

## 2018-06-29 PROCEDURE — 61783 SCAN PROC SPINAL: CPT

## 2018-06-29 PROCEDURE — 22802 ARTHRD PST DFRM 7-12 VRT SGM: CPT | Mod: 62

## 2018-06-29 PROCEDURE — 84100 ASSAY OF PHOSPHORUS: CPT

## 2018-06-29 PROCEDURE — 71045 X-RAY EXAM CHEST 1 VIEW: CPT

## 2018-06-29 PROCEDURE — 22843 INSERT SPINE FIXATION DEVICE: CPT | Mod: 80

## 2018-06-29 PROCEDURE — 36415 COLL VENOUS BLD VENIPUNCTURE: CPT

## 2018-06-29 PROCEDURE — 97116 GAIT TRAINING THERAPY: CPT

## 2018-06-29 PROCEDURE — 87040 BLOOD CULTURE FOR BACTERIA: CPT

## 2018-06-29 PROCEDURE — 84550 ASSAY OF BLOOD/URIC ACID: CPT

## 2018-06-29 PROCEDURE — 80048 BASIC METABOLIC PNL TOTAL CA: CPT

## 2018-06-29 PROCEDURE — 80053 COMPREHEN METABOLIC PANEL: CPT

## 2018-06-29 PROCEDURE — 86850 RBC ANTIBODY SCREEN: CPT

## 2018-06-29 PROCEDURE — 81001 URINALYSIS AUTO W/SCOPE: CPT

## 2018-06-29 PROCEDURE — 94660 CPAP INITIATION&MGMT: CPT

## 2018-06-29 PROCEDURE — 97530 THERAPEUTIC ACTIVITIES: CPT

## 2018-06-29 PROCEDURE — 97164 PT RE-EVAL EST PLAN CARE: CPT

## 2018-06-29 PROCEDURE — 22843 INSERT SPINE FIXATION DEVICE: CPT

## 2018-06-29 PROCEDURE — 20939 BONE MARROW ASPIR BONE GRFG: CPT

## 2018-06-29 PROCEDURE — 97161 PT EVAL LOW COMPLEX 20 MIN: CPT

## 2018-06-29 PROCEDURE — 85027 COMPLETE CBC AUTOMATED: CPT

## 2018-06-29 PROCEDURE — 61783 SCAN PROC SPINAL: CPT | Mod: 80

## 2018-06-29 RX ORDER — DEXTROSE 50 % IN WATER 50 %
25 SYRINGE (ML) INTRAVENOUS ONCE
Qty: 0 | Refills: 0 | Status: DISCONTINUED | OUTPATIENT
Start: 2018-06-29 | End: 2018-07-11

## 2018-06-29 RX ORDER — DEXAMETHASONE 0.5 MG/5ML
4 ELIXIR ORAL EVERY 6 HOURS
Qty: 0 | Refills: 0 | Status: DISCONTINUED | OUTPATIENT
Start: 2018-06-29 | End: 2018-06-30

## 2018-06-29 RX ORDER — HYDROMORPHONE HYDROCHLORIDE 2 MG/ML
1 INJECTION INTRAMUSCULAR; INTRAVENOUS; SUBCUTANEOUS
Qty: 0 | Refills: 0 | Status: DISCONTINUED | OUTPATIENT
Start: 2018-06-29 | End: 2018-07-01

## 2018-06-29 RX ORDER — NOREPINEPHRINE BITARTRATE/D5W 8 MG/250ML
0.05 PLASTIC BAG, INJECTION (ML) INTRAVENOUS
Qty: 8 | Refills: 0 | Status: DISCONTINUED | OUTPATIENT
Start: 2018-06-29 | End: 2018-06-30

## 2018-06-29 RX ORDER — LOSARTAN POTASSIUM 100 MG/1
25 TABLET, FILM COATED ORAL DAILY
Qty: 0 | Refills: 0 | Status: DISCONTINUED | OUTPATIENT
Start: 2018-06-29 | End: 2018-06-30

## 2018-06-29 RX ORDER — SODIUM CHLORIDE 9 MG/ML
1000 INJECTION, SOLUTION INTRAVENOUS
Qty: 0 | Refills: 0 | Status: DISCONTINUED | OUTPATIENT
Start: 2018-06-29 | End: 2018-07-11

## 2018-06-29 RX ORDER — ATORVASTATIN CALCIUM 80 MG/1
80 TABLET, FILM COATED ORAL AT BEDTIME
Qty: 0 | Refills: 0 | Status: DISCONTINUED | OUTPATIENT
Start: 2018-06-29 | End: 2018-07-11

## 2018-06-29 RX ORDER — DEXTROSE 50 % IN WATER 50 %
12.5 SYRINGE (ML) INTRAVENOUS ONCE
Qty: 0 | Refills: 0 | Status: DISCONTINUED | OUTPATIENT
Start: 2018-06-29 | End: 2018-07-11

## 2018-06-29 RX ORDER — OXYCODONE AND ACETAMINOPHEN 5; 325 MG/1; MG/1
2 TABLET ORAL EVERY 6 HOURS
Qty: 0 | Refills: 0 | Status: DISCONTINUED | OUTPATIENT
Start: 2018-06-29 | End: 2018-07-06

## 2018-06-29 RX ORDER — ATORVASTATIN CALCIUM 80 MG/1
80 TABLET, FILM COATED ORAL AT BEDTIME
Qty: 0 | Refills: 0 | Status: DISCONTINUED | OUTPATIENT
Start: 2018-06-29 | End: 2018-06-29

## 2018-06-29 RX ORDER — BUPIVACAINE 13.3 MG/ML
20 INJECTION, SUSPENSION, LIPOSOMAL INFILTRATION ONCE
Qty: 0 | Refills: 0 | Status: DISCONTINUED | OUTPATIENT
Start: 2018-06-29 | End: 2018-07-11

## 2018-06-29 RX ORDER — INSULIN LISPRO 100/ML
VIAL (ML) SUBCUTANEOUS
Qty: 0 | Refills: 0 | Status: DISCONTINUED | OUTPATIENT
Start: 2018-06-29 | End: 2018-07-11

## 2018-06-29 RX ORDER — DIAZEPAM 5 MG
5 TABLET ORAL EVERY 8 HOURS
Qty: 0 | Refills: 0 | Status: DISCONTINUED | OUTPATIENT
Start: 2018-06-29 | End: 2018-06-30

## 2018-06-29 RX ORDER — OXYCODONE AND ACETAMINOPHEN 5; 325 MG/1; MG/1
1 TABLET ORAL EVERY 4 HOURS
Qty: 0 | Refills: 0 | Status: DISCONTINUED | OUTPATIENT
Start: 2018-06-29 | End: 2018-07-06

## 2018-06-29 RX ORDER — ONDANSETRON 8 MG/1
4 TABLET, FILM COATED ORAL EVERY 6 HOURS
Qty: 0 | Refills: 0 | Status: DISCONTINUED | OUTPATIENT
Start: 2018-06-29 | End: 2018-07-11

## 2018-06-29 RX ORDER — DOCUSATE SODIUM 100 MG
100 CAPSULE ORAL THREE TIMES A DAY
Qty: 0 | Refills: 0 | Status: DISCONTINUED | OUTPATIENT
Start: 2018-06-29 | End: 2018-07-11

## 2018-06-29 RX ORDER — SENNA PLUS 8.6 MG/1
2 TABLET ORAL AT BEDTIME
Qty: 0 | Refills: 0 | Status: DISCONTINUED | OUTPATIENT
Start: 2018-06-29 | End: 2018-07-11

## 2018-06-29 RX ORDER — GLUCAGON INJECTION, SOLUTION 0.5 MG/.1ML
1 INJECTION, SOLUTION SUBCUTANEOUS ONCE
Qty: 0 | Refills: 0 | Status: DISCONTINUED | OUTPATIENT
Start: 2018-06-29 | End: 2018-07-11

## 2018-06-29 RX ORDER — ACETAMINOPHEN 500 MG
650 TABLET ORAL EVERY 6 HOURS
Qty: 0 | Refills: 0 | Status: DISCONTINUED | OUTPATIENT
Start: 2018-06-29 | End: 2018-07-11

## 2018-06-29 RX ORDER — SODIUM CHLORIDE 9 MG/ML
1000 INJECTION INTRAMUSCULAR; INTRAVENOUS; SUBCUTANEOUS
Qty: 0 | Refills: 0 | Status: DISCONTINUED | OUTPATIENT
Start: 2018-06-29 | End: 2018-06-30

## 2018-06-29 RX ORDER — DEXTROSE 50 % IN WATER 50 %
15 SYRINGE (ML) INTRAVENOUS ONCE
Qty: 0 | Refills: 0 | Status: DISCONTINUED | OUTPATIENT
Start: 2018-06-29 | End: 2018-07-11

## 2018-06-29 RX ORDER — CEFAZOLIN SODIUM 1 G
2000 VIAL (EA) INJECTION EVERY 8 HOURS
Qty: 0 | Refills: 0 | Status: COMPLETED | OUTPATIENT
Start: 2018-06-29 | End: 2018-06-30

## 2018-06-29 RX ORDER — ALBUMIN HUMAN 25 %
1000 VIAL (ML) INTRAVENOUS ONCE
Qty: 0 | Refills: 0 | Status: DISCONTINUED | OUTPATIENT
Start: 2018-06-29 | End: 2018-06-30

## 2018-06-29 RX ORDER — ALBUMIN HUMAN 25 %
500 VIAL (ML) INTRAVENOUS ONCE
Qty: 0 | Refills: 0 | Status: DISCONTINUED | OUTPATIENT
Start: 2018-06-29 | End: 2018-06-30

## 2018-06-29 RX ORDER — LOSARTAN POTASSIUM 100 MG/1
25 TABLET, FILM COATED ORAL DAILY
Qty: 0 | Refills: 0 | Status: DISCONTINUED | OUTPATIENT
Start: 2018-06-29 | End: 2018-06-29

## 2018-06-29 RX ADMIN — Medication 4 MILLIGRAM(S): at 22:26

## 2018-06-29 RX ADMIN — Medication 100 MILLIGRAM(S): at 22:27

## 2018-06-29 RX ADMIN — HYDROMORPHONE HYDROCHLORIDE 1 MILLIGRAM(S): 2 INJECTION INTRAMUSCULAR; INTRAVENOUS; SUBCUTANEOUS at 23:34

## 2018-06-29 RX ADMIN — HYDROMORPHONE HYDROCHLORIDE 1 MILLIGRAM(S): 2 INJECTION INTRAMUSCULAR; INTRAVENOUS; SUBCUTANEOUS at 23:03

## 2018-06-29 RX ADMIN — HYDROMORPHONE HYDROCHLORIDE 1 MILLIGRAM(S): 2 INJECTION INTRAMUSCULAR; INTRAVENOUS; SUBCUTANEOUS at 22:34

## 2018-06-29 RX ADMIN — Medication 4: at 22:26

## 2018-06-29 NOTE — H&P ADULT - HISTORY OF PRESENT ILLNESS
73 y/o male pmhx HTN, HLD, spinal stenosis s/p lumbar laminectomy last year presents today for elective T10-pelvis fusin today. Patient reports chronic LBP and ambulates with cane assistance. He denies any falls but reports imbalance with ambulating. He denies any leg pain, weakness, numbness or tingling.

## 2018-06-29 NOTE — H&P ADULT - PSH
H/O laminectomy    History of hernia repair    S/P hip replacement  right hip  S/P knee replacement, bilateral

## 2018-06-29 NOTE — H&P ADULT - ASSESSMENT
71 y/o female presents for elective T10-pelvis fusion today  consent signed and witnessed  -ICU post op   -pain control  -neuro/spine checks post op  -ADAT  -post op ancef  -SCDs post op  -imaging per Dr. Young  -case d/w Dr. Young 71 y/o male presents for elective T10-pelvis fusion today  consent signed and witnessed  -ICU post op   -pain control  -neuro/spine checks post op  -ADAT  -post op ancef  -SCDs post op  -imaging per Dr. Young  -case d/w Dr. Young

## 2018-06-29 NOTE — PROGRESS NOTE ADULT - SUBJECTIVE AND OBJECTIVE BOX
NEUROSURGERY POST OP NOTE:    POD# 0 S/P     S: Pt seen and examined at bedside in PACU.       T(C): --  HR: --  BP: --  RR: --  SpO2: --        acetaminophen   Tablet 650 milliGRAM(s) Oral every 6 hours PRN  acetaminophen   Tablet. 650 milliGRAM(s) Oral every 6 hours PRN  albumin human  5% IVPB 500 milliLiter(s) IV Intermittent once  albumin human  5% IVPB 1000 milliLiter(s) IV Intermittent once  atorvastatin 80 milliGRAM(s) Oral at bedtime  BUpivacaine liposome 1.3% Injectable (no eMAR) 20 milliLiter(s) Local Injection once  ceFAZolin   IVPB 2000 milliGRAM(s) IV Intermittent every 8 hours  dexamethasone  Injectable 4 milliGRAM(s) IV Push every 6 hours  diazepam    Tablet 5 milliGRAM(s) Oral every 8 hours PRN  docusate sodium 100 milliGRAM(s) Oral three times a day  HYDROmorphone  Injectable 1 milliGRAM(s) IV Push every 3 hours PRN  losartan 25 milliGRAM(s) Oral daily  ondansetron Injectable 4 milliGRAM(s) IV Push every 6 hours PRN  oxyCODONE    5 mG/acetaminophen 325 mG 1 Tablet(s) Oral every 4 hours PRN  oxyCODONE    5 mG/acetaminophen 325 mG 2 Tablet(s) Oral every 6 hours PRN  senna 2 Tablet(s) Oral at bedtime  sodium chloride 0.9%. 1000 milliLiter(s) IV Continuous <Continuous>      RADIOLOGY:     Exam:  AOx3, NAD, fluent speech   PERRL, EOMI, visual fields intact   CN II-XII intact   Face symmetric   No pronator drift   Motor 5/5 throughout   SILT     WOUND/DRAINS:      Assessment: 72y Male s/p       Plan:  -   - D/w  NEUROSURGERY POST OP NOTE:    POD# 0 S/P T10 to pelvis instrumentation and fusion with plastics closure for sagittal plane imbalance.     S: Pt seen and examined at bedside in ICU. Intubated, sedated on propofol, on levophed for BP,        T(C): --  HR: --  BP: --  RR: --  SpO2: --        acetaminophen   Tablet 650 milliGRAM(s) Oral every 6 hours PRN  acetaminophen   Tablet. 650 milliGRAM(s) Oral every 6 hours PRN  albumin human  5% IVPB 500 milliLiter(s) IV Intermittent once  albumin human  5% IVPB 1000 milliLiter(s) IV Intermittent once  atorvastatin 80 milliGRAM(s) Oral at bedtime  BUpivacaine liposome 1.3% Injectable (no eMAR) 20 milliLiter(s) Local Injection once  ceFAZolin   IVPB 2000 milliGRAM(s) IV Intermittent every 8 hours  dexamethasone  Injectable 4 milliGRAM(s) IV Push every 6 hours  diazepam    Tablet 5 milliGRAM(s) Oral every 8 hours PRN  docusate sodium 100 milliGRAM(s) Oral three times a day  HYDROmorphone  Injectable 1 milliGRAM(s) IV Push every 3 hours PRN  losartan 25 milliGRAM(s) Oral daily  ondansetron Injectable 4 milliGRAM(s) IV Push every 6 hours PRN  oxyCODONE    5 mG/acetaminophen 325 mG 1 Tablet(s) Oral every 4 hours PRN  oxyCODONE    5 mG/acetaminophen 325 mG 2 Tablet(s) Oral every 6 hours PRN  senna 2 Tablet(s) Oral at bedtime  sodium chloride 0.9%. 1000 milliLiter(s) IV Continuous <Continuous>      RADIOLOGY:     Exam:  AOx3, NAD, fluent speech   PERRL, EOMI, visual fields intact   CN II-XII intact   Face symmetric   No pronator drift   Motor 5/5 throughout   SILT     WOUND/DRAINS:      Assessment: 72y Male s/p       Plan:  -   - D/w  NEUROSURGERY POST OP NOTE:    POD# 0 S/P T10 to pelvis instrumentation and fusion with plastics closure for sagittal plane imbalance.     S: Pt seen and examined at bedside in ICU. Intubated, sedated on propofol, on levophed for BP. OE, FC, LACY. Appears comfortable.       acetaminophen   Tablet 650 milliGRAM(s) Oral every 6 hours PRN  acetaminophen   Tablet. 650 milliGRAM(s) Oral every 6 hours PRN  albumin human  5% IVPB 500 milliLiter(s) IV Intermittent once  albumin human  5% IVPB 1000 milliLiter(s) IV Intermittent once  atorvastatin 80 milliGRAM(s) Oral at bedtime  BUpivacaine liposome 1.3% Injectable (no eMAR) 20 milliLiter(s) Local Injection once  ceFAZolin   IVPB 2000 milliGRAM(s) IV Intermittent every 8 hours  dexamethasone  Injectable 4 milliGRAM(s) IV Push every 6 hours  diazepam    Tablet 5 milliGRAM(s) Oral every 8 hours PRN  docusate sodium 100 milliGRAM(s) Oral three times a day  HYDROmorphone  Injectable 1 milliGRAM(s) IV Push every 3 hours PRN  losartan 25 milliGRAM(s) Oral daily  ondansetron Injectable 4 milliGRAM(s) IV Push every 6 hours PRN  oxyCODONE    5 mG/acetaminophen 325 mG 1 Tablet(s) Oral every 4 hours PRN  oxyCODONE    5 mG/acetaminophen 325 mG 2 Tablet(s) Oral every 6 hours PRN  senna 2 Tablet(s) Oral at bedtime  sodium chloride 0.9%. 1000 milliLiter(s) IV Continuous <Continuous>      RADIOLOGY:   N/A     Exam:  OE to voice, NAD, FC b/l   PERRL, EOMI  Face appears symmetric   Motor: MAEx4 strong  Small abrasions to anterior leg, groin and chest, likely 2/2 prone positioning intra-op     WOUND/DRAINS: 2 HMV and 2 KARON drains (PRS managing)   Incision with dressing in place: c/d/i, no oozing, non-tender to palpation      Assessment: 72y Male s/p T10 to pelvis instrumentation and fusion with plastics closure for sagittal plane imbalance, recovering well in SICU.      Plan:  - Pt to remain intubated overnight   - Sedated with propofol and fentanyl   - Low BP initially post-op, placed on levophed, will wean as tolerated   - BP    - Post-op ancef   - NS @100  - Decadron 4q6  - Cont. Atorvastatin, losartan  - Spoke with eICU, unable to discuss pt at this time, will return call when available NEUROSURGERY POST OP NOTE:    POD# 0 S/P T10 to pelvis instrumentation and fusion with plastics closure for sagittal plane imbalance.     S: Pt seen and examined at bedside in ICU. Intubated, sedated on propofol, on levophed for BP. OE, FC, LACY. Appears comfortable.       acetaminophen   Tablet 650 milliGRAM(s) Oral every 6 hours PRN  acetaminophen   Tablet. 650 milliGRAM(s) Oral every 6 hours PRN  albumin human  5% IVPB 500 milliLiter(s) IV Intermittent once  albumin human  5% IVPB 1000 milliLiter(s) IV Intermittent once  atorvastatin 80 milliGRAM(s) Oral at bedtime  BUpivacaine liposome 1.3% Injectable (no eMAR) 20 milliLiter(s) Local Injection once  ceFAZolin   IVPB 2000 milliGRAM(s) IV Intermittent every 8 hours  dexamethasone  Injectable 4 milliGRAM(s) IV Push every 6 hours  diazepam    Tablet 5 milliGRAM(s) Oral every 8 hours PRN  docusate sodium 100 milliGRAM(s) Oral three times a day  HYDROmorphone  Injectable 1 milliGRAM(s) IV Push every 3 hours PRN  losartan 25 milliGRAM(s) Oral daily  ondansetron Injectable 4 milliGRAM(s) IV Push every 6 hours PRN  oxyCODONE    5 mG/acetaminophen 325 mG 1 Tablet(s) Oral every 4 hours PRN  oxyCODONE    5 mG/acetaminophen 325 mG 2 Tablet(s) Oral every 6 hours PRN  senna 2 Tablet(s) Oral at bedtime  sodium chloride 0.9%. 1000 milliLiter(s) IV Continuous <Continuous>      RADIOLOGY:   N/A     Exam:  OE to voice, NAD, FC b/l   PERRL, EOMI  Face appears symmetric   Motor: MAEx4 strong  Small abrasions to anterior leg, groin and chest, likely 2/2 prone positioning intra-op     WOUND/DRAINS: 2 HMV and 2 KARON drains (PRS managing)   Incision with dressing in place: c/d/i, no oozing, non-tender to palpation      Assessment: 72y Male s/p T10 to pelvis instrumentation and fusion with plastics closure for sagittal plane imbalance, recovering well in SICU.      Plan:  - Pt to remain intubated overnight   - Sedated with propofol and fentanyl   - Low BP initially post-op, placed on levophed, will wean as tolerated   - BP    - Post-op ancef   - NS @100  - Decadron 4q6  - Cont. Atorvastatin, losartan  - Spoke with eICU, unable to discuss pt at this time, will return call when available     -Discussed case at a later time with Dr. Pastor: recommended 10mg Lasix, dc IVF

## 2018-06-29 NOTE — H&P ADULT - NSHPPHYSICALEXAM_GEN_ALL_CORE
A&O x 3, pleasant, comfortable in chair  EOMI, PERRL  respiraitons non labored  RRR  LACY x 4, neg drift, motor 5/5 throughout  sensation intact b/l

## 2018-06-30 LAB
ALBUMIN SERPL ELPH-MCNC: 3.9 G/DL — SIGNIFICANT CHANGE UP (ref 3.3–5)
ALP SERPL-CCNC: 28 U/L — LOW (ref 40–120)
ALT FLD-CCNC: 38 U/L — SIGNIFICANT CHANGE UP (ref 10–45)
ANION GAP SERPL CALC-SCNC: 13 MMOL/L — SIGNIFICANT CHANGE UP (ref 5–17)
AST SERPL-CCNC: 49 U/L — HIGH (ref 10–40)
BASE EXCESS BLDA CALC-SCNC: -0.5 MMOL/L — SIGNIFICANT CHANGE UP (ref -2–3)
BILIRUB DIRECT SERPL-MCNC: <0.2 MG/DL — SIGNIFICANT CHANGE UP (ref 0–0.2)
BILIRUB INDIRECT FLD-MCNC: >0.1 MG/DL — LOW (ref 0.2–1)
BILIRUB SERPL-MCNC: 0.3 MG/DL — SIGNIFICANT CHANGE UP (ref 0.2–1.2)
BUN SERPL-MCNC: 21 MG/DL — SIGNIFICANT CHANGE UP (ref 7–23)
CALCIUM SERPL-MCNC: 7.8 MG/DL — LOW (ref 8.4–10.5)
CHLORIDE SERPL-SCNC: 103 MMOL/L — SIGNIFICANT CHANGE UP (ref 96–108)
CO2 SERPL-SCNC: 23 MMOL/L — SIGNIFICANT CHANGE UP (ref 22–31)
CREAT SERPL-MCNC: 1.13 MG/DL — SIGNIFICANT CHANGE UP (ref 0.5–1.3)
GAS PNL BLDA: SIGNIFICANT CHANGE UP
GLUCOSE BLDC GLUCOMTR-MCNC: 167 MG/DL — HIGH (ref 70–99)
GLUCOSE BLDC GLUCOMTR-MCNC: 192 MG/DL — HIGH (ref 70–99)
GLUCOSE BLDC GLUCOMTR-MCNC: 194 MG/DL — HIGH (ref 70–99)
GLUCOSE BLDC GLUCOMTR-MCNC: 195 MG/DL — HIGH (ref 70–99)
GLUCOSE SERPL-MCNC: 181 MG/DL — HIGH (ref 70–99)
HBA1C BLD-MCNC: 5.3 % — SIGNIFICANT CHANGE UP (ref 4–5.6)
HCO3 BLDA-SCNC: 23 MMOL/L — SIGNIFICANT CHANGE UP (ref 21–28)
HCT VFR BLD CALC: 25.7 % — LOW (ref 39–50)
HGB BLD-MCNC: 8.6 G/DL — LOW (ref 13–17)
LYMPHOCYTES # BLD AUTO: 5.2 % — LOW (ref 13–44)
MAGNESIUM SERPL-MCNC: 1.6 MG/DL — SIGNIFICANT CHANGE UP (ref 1.6–2.6)
MCHC RBC-ENTMCNC: 30.8 PG — SIGNIFICANT CHANGE UP (ref 27–34)
MCHC RBC-ENTMCNC: 33.5 G/DL — SIGNIFICANT CHANGE UP (ref 32–36)
MCV RBC AUTO: 92.1 FL — SIGNIFICANT CHANGE UP (ref 80–100)
MONOCYTES NFR BLD AUTO: 7.9 % — SIGNIFICANT CHANGE UP (ref 2–14)
NEUTROPHILS NFR BLD AUTO: 86.9 % — HIGH (ref 43–77)
PCO2 BLDA: 35 MMHG — SIGNIFICANT CHANGE UP (ref 35–48)
PH BLDA: 7.44 — SIGNIFICANT CHANGE UP (ref 7.35–7.45)
PHOSPHATE SERPL-MCNC: 3.5 MG/DL — SIGNIFICANT CHANGE UP (ref 2.5–4.5)
PLATELET # BLD AUTO: 175 K/UL — SIGNIFICANT CHANGE UP (ref 150–400)
PO2 BLDA: 171 MMHG — HIGH (ref 83–108)
POTASSIUM SERPL-MCNC: 4.2 MMOL/L — SIGNIFICANT CHANGE UP (ref 3.5–5.3)
POTASSIUM SERPL-SCNC: 4.2 MMOL/L — SIGNIFICANT CHANGE UP (ref 3.5–5.3)
PROT SERPL-MCNC: 5.3 G/DL — LOW (ref 6–8.3)
RBC # BLD: 2.79 M/UL — LOW (ref 4.2–5.8)
RBC # FLD: 13.7 % — SIGNIFICANT CHANGE UP (ref 10.3–16.9)
SAO2 % BLDA: 99 % — SIGNIFICANT CHANGE UP (ref 95–100)
SODIUM SERPL-SCNC: 139 MMOL/L — SIGNIFICANT CHANGE UP (ref 135–145)
WBC # BLD: 12.4 K/UL — HIGH (ref 3.8–10.5)
WBC # FLD AUTO: 12.4 K/UL — HIGH (ref 3.8–10.5)

## 2018-06-30 PROCEDURE — G0508: CPT | Mod: GT

## 2018-06-30 PROCEDURE — 71045 X-RAY EXAM CHEST 1 VIEW: CPT | Mod: 26

## 2018-06-30 PROCEDURE — 99291 CRITICAL CARE FIRST HOUR: CPT | Mod: 24

## 2018-06-30 RX ORDER — PANTOPRAZOLE SODIUM 20 MG/1
40 TABLET, DELAYED RELEASE ORAL
Qty: 0 | Refills: 0 | Status: DISCONTINUED | OUTPATIENT
Start: 2018-06-30 | End: 2018-07-11

## 2018-06-30 RX ORDER — BACITRACIN ZINC 500 UNIT/G
1 OINTMENT IN PACKET (EA) TOPICAL
Qty: 0 | Refills: 0 | Status: DISCONTINUED | OUTPATIENT
Start: 2018-06-30 | End: 2018-06-30

## 2018-06-30 RX ORDER — DEXAMETHASONE 0.5 MG/5ML
4 ELIXIR ORAL EVERY 6 HOURS
Qty: 0 | Refills: 0 | Status: DISCONTINUED | OUTPATIENT
Start: 2018-06-30 | End: 2018-07-01

## 2018-06-30 RX ORDER — PANTOPRAZOLE SODIUM 20 MG/1
40 TABLET, DELAYED RELEASE ORAL DAILY
Qty: 0 | Refills: 0 | Status: DISCONTINUED | OUTPATIENT
Start: 2018-06-30 | End: 2018-06-30

## 2018-06-30 RX ORDER — FUROSEMIDE 40 MG
10 TABLET ORAL ONCE
Qty: 0 | Refills: 0 | Status: COMPLETED | OUTPATIENT
Start: 2018-06-30 | End: 2018-06-30

## 2018-06-30 RX ORDER — MAGNESIUM SULFATE 500 MG/ML
1 VIAL (ML) INJECTION ONCE
Qty: 0 | Refills: 0 | Status: COMPLETED | OUTPATIENT
Start: 2018-06-30 | End: 2018-06-30

## 2018-06-30 RX ADMIN — HYDROMORPHONE HYDROCHLORIDE 1 MILLIGRAM(S): 2 INJECTION INTRAMUSCULAR; INTRAVENOUS; SUBCUTANEOUS at 07:00

## 2018-06-30 RX ADMIN — Medication 2: at 22:23

## 2018-06-30 RX ADMIN — Medication 100 MILLIGRAM(S): at 14:01

## 2018-06-30 RX ADMIN — Medication 2: at 15:54

## 2018-06-30 RX ADMIN — HYDROMORPHONE HYDROCHLORIDE 1 MILLIGRAM(S): 2 INJECTION INTRAMUSCULAR; INTRAVENOUS; SUBCUTANEOUS at 06:19

## 2018-06-30 RX ADMIN — Medication 100 MILLIGRAM(S): at 22:23

## 2018-06-30 RX ADMIN — Medication 100 MILLIGRAM(S): at 14:02

## 2018-06-30 RX ADMIN — Medication 2: at 06:14

## 2018-06-30 RX ADMIN — Medication 4 MILLIGRAM(S): at 04:15

## 2018-06-30 RX ADMIN — Medication 2: at 11:56

## 2018-06-30 RX ADMIN — OXYCODONE AND ACETAMINOPHEN 2 TABLET(S): 5; 325 TABLET ORAL at 12:25

## 2018-06-30 RX ADMIN — Medication 100 GRAM(S): at 06:20

## 2018-06-30 RX ADMIN — Medication 100 MILLIGRAM(S): at 06:14

## 2018-06-30 RX ADMIN — Medication 4 MILLIGRAM(S): at 16:02

## 2018-06-30 RX ADMIN — PANTOPRAZOLE SODIUM 40 MILLIGRAM(S): 20 TABLET, DELAYED RELEASE ORAL at 11:50

## 2018-06-30 RX ADMIN — PANTOPRAZOLE SODIUM 40 MILLIGRAM(S): 20 TABLET, DELAYED RELEASE ORAL at 22:58

## 2018-06-30 RX ADMIN — SENNA PLUS 2 TABLET(S): 8.6 TABLET ORAL at 22:23

## 2018-06-30 RX ADMIN — Medication 10 MILLIGRAM(S): at 04:14

## 2018-06-30 RX ADMIN — Medication 4 MILLIGRAM(S): at 22:23

## 2018-06-30 RX ADMIN — OXYCODONE AND ACETAMINOPHEN 2 TABLET(S): 5; 325 TABLET ORAL at 13:30

## 2018-06-30 RX ADMIN — Medication 100 GRAM(S): at 09:30

## 2018-06-30 RX ADMIN — ATORVASTATIN CALCIUM 80 MILLIGRAM(S): 80 TABLET, FILM COATED ORAL at 22:23

## 2018-06-30 RX ADMIN — Medication 4 MILLIGRAM(S): at 09:30

## 2018-06-30 NOTE — PROGRESS NOTE ADULT - SUBJECTIVE AND OBJECTIVE BOX
SUMMARY:HPI:  73 y/o male pmhx HTN, HLD, PVD; spinal stenosis s/p lumbar laminectomy last year presents today for elective T10-pelvis fusin today. Patient reports chronic LBP and ambulates with cane assistance. He denies any falls but reports imbalance with ambulating. He denies any leg pain, weakness, numbness or tingling.  6/29- T10 to pelvis instrumentation and fusion w/ plastics closure          Allergies    No Known Allergies    Intolerances        REVIEW OF SYSTEMS:   [ X] All ROS addressed below are non-contributory, except:  Neuro: [ ] Headache [X ] Back pain [ ] Numbness [ ] Weakness [ ] Ataxia [ ] Dizziness [ ] Aphasia [ ] Dysarthria [ ] Visual disturbance  Resp: [ ] Shortness of breath/dyspnea, [ ] Orthopnea [ ] Cough  CV: [ ] Chest pain [ ] Palpitation [ ] Lightheadedness [ ] Syncope  Renal: [ ] Thirst [ ] Edema  GI: [ ] Nausea [ ] Emesis [ ] Abdominal pain [ ] Constipation [ ] Diarrhea  Hem: [ ] Hematemesis [ ] bright red blood per rectum  ID: [ ] Fever [ ] Chills [ ] Dysuria  ENT: [ ] Rhinorrhea      VITALS: [X ] Reviewed  Vital Signs Last 24 Hrs  T(C): 36.3 (30 Jun 2018 01:35), Max: 36.3 (30 Jun 2018 01:35)  T(F): 97.4 (30 Jun 2018 01:35), Max: 97.4 (30 Jun 2018 01:35)  HR: 112 (30 Jun 2018 02:00) (84 - 112)  BP: 147/86 (30 Jun 2018 02:00) (107/59 - 147/86)  BP(mean): 106 (30 Jun 2018 02:00) (72 - 106)  RR: 17 (30 Jun 2018 02:00) (12 - 18)  SpO2: 100% (30 Jun 2018 02:00) (100% - 100%)  CAPILLARY BLOOD GLUCOSE      POCT Blood Glucose.: 208 mg/dL (29 Jun 2018 21:53)  POCT Blood Glucose.: 186 mg/dL (29 Jun 2018 17:17)  POCT Blood Glucose.: 162 mg/dL (29 Jun 2018 14:37)  POCT Blood Glucose.: 159 mg/dL (29 Jun 2018 12:20)    Mode: AC/ CMV (Assist Control/ Continuous Mandatory Ventilation)  RR (machine): 12  TV (machine): 650  FiO2: 50  PEEP: 5  ITime: 1  MAP: 9.8  PIP: 19      LABS:    06-29    140  |  103  |  21  ----------------------------<  203<H>  4.4   |  22  |  1.11    Ca    8.0<L>      29 Jun 2018 21:56                            8.5    9.2   )-----------( 151      ( 29 Jun 2018 21:56 )             25.0       STROKE CORE MEASURES:      MEDICATION LEVELS:     IVF FLUIDS/MEDICATIONS: [X ] Reviewed  MEDICATIONS  (STANDING):  albumin human  5% IVPB 500 milliLiter(s) IV Intermittent once  albumin human  5% IVPB 1000 milliLiter(s) IV Intermittent once  atorvastatin 80 milliGRAM(s) Oral at bedtime  BUpivacaine liposome 1.3% Injectable (no eMAR) 20 milliLiter(s) Local Injection once  ceFAZolin   IVPB 2000 milliGRAM(s) IV Intermittent every 8 hours  dexamethasone  Injectable 4 milliGRAM(s) IV Push every 6 hours  dextrose 5%. 1000 milliLiter(s) (50 mL/Hr) IV Continuous <Continuous>  dextrose 50% Injectable 12.5 Gram(s) IV Push once  dextrose 50% Injectable 25 Gram(s) IV Push once  dextrose 50% Injectable 25 Gram(s) IV Push once  docusate sodium 100 milliGRAM(s) Oral three times a day  insulin lispro (HumaLOG) corrective regimen sliding scale   SubCutaneous Before meals and at bedtime  losartan 25 milliGRAM(s) Oral daily  norepinephrine Infusion 0.05 MICROgram(s)/kG/Min (12.375 mL/Hr) IV Continuous <Continuous>  senna 2 Tablet(s) Oral at bedtime  sodium chloride 0.9%. 1000 milliLiter(s) (100 mL/Hr) IV Continuous <Continuous>    MEDICATIONS  (PRN):  acetaminophen   Tablet 650 milliGRAM(s) Oral every 6 hours PRN For Temp greater than 38 C (100.4 F)  acetaminophen   Tablet. 650 milliGRAM(s) Oral every 6 hours PRN Mild Pain (1 - 3)  dextrose 40% Gel 15 Gram(s) Oral once PRN Blood Glucose LESS THAN 70 milliGRAM(s)/deciliter  diazepam    Tablet 5 milliGRAM(s) Oral every 8 hours PRN muscle spasm  glucagon  Injectable 1 milliGRAM(s) IntraMuscular once PRN Glucose LESS THAN 70 milligrams/deciliter  HYDROmorphone  Injectable 1 milliGRAM(s) IV Push every 3 hours PRN break through pain  ondansetron Injectable 4 milliGRAM(s) IV Push every 6 hours PRN Nausea  oxyCODONE    5 mG/acetaminophen 325 mG 1 Tablet(s) Oral every 4 hours PRN Moderate Pain  oxyCODONE    5 mG/acetaminophen 325 mG 2 Tablet(s) Oral every 6 hours PRN Severe Pain    I&O's Summary    29 Jun 2018 07:01  -  30 Jun 2018 02:35  --------------------------------------------------------  IN: 7150 mL / OUT: 2080 mL / NET: 5070 mL        EXAMINATION:  PHYSICAL EXAM:    Constitutional: No Acute Distress     Neurological: Awake, alert oriented to person, place and time, Following Commands, PERRL, EOMI, No Gaze Preference, Face Symmetrical, Speech Fluent, No dysmetria, No ataxia, No nystagmus     Motor exam:          Upper extremity                         Delt     Bicep     Tricep    HG                                                 R         5/5        5/5        5/5       5/5                                               L          5/5        5/5        5/5       5/5          Lower extremity                        HF         KF        KE       DF         PF                                                  R        5/5        5/5        5/5       5/5         5/5                                               L         5/5        5/5       5/5       5/5          5/5                                                 Sensation: [ ] intact to light touch  [ ] decreased:     Reflexes: Deep Tendon Reflexes Intact     Pulmonary: Clear to Auscultation, No rales, No rhonchi, No wheezes     Cardiovascular: S1, S2, Regular rate and rhythm     Gastrointestinal: Soft, Non-tender, Non-distended     Extremities: No calf tenderness     Incision: SUMMARY:HPI:  71 y/o male pmhx HTN, HLD, PVD; spinal stenosis s/p lumbar laminectomy last year presents today for elective T10-pelvis fusin today. Patient reports chronic LBP and ambulates with cane assistance. He denies any falls but reports imbalance with ambulating. He denies any leg pain, weakness, numbness or tingling.  6/29- T10 to pelvis instrumentation and fusion w/ plastics closure       Allergies    No Known Allergies    Intolerances    REVIEW OF SYSTEMS:   [ X] All ROS addressed below are non-contributory, except:  Neuro: [ ] Headache [X ] Back pain [ ] Numbness [ ] Weakness [ ] Ataxia [ ] Dizziness [ ] Aphasia [ ] Dysarthria [ ] Visual disturbance  Resp: [ ] Shortness of breath/dyspnea, [ ] Orthopnea [ ] Cough  CV: [ ] Chest pain [ ] Palpitation [ ] Lightheadedness [ ] Syncope  Renal: [ ] Thirst [ ] Edema  GI: [ ] Nausea [ ] Emesis [ ] Abdominal pain [ ] Constipation [ ] Diarrhea  Hem: [ ] Hematemesis [ ] bright red blood per rectum  ID: [ ] Fever [ ] Chills [ ] Dysuria  ENT: [ ] Rhinorrhea      VITALS: [X ] Reviewed  Vital Signs Last 24 Hrs  T(C): 36.3 (30 Jun 2018 01:35), Max: 36.3 (30 Jun 2018 01:35)  T(F): 97.4 (30 Jun 2018 01:35), Max: 97.4 (30 Jun 2018 01:35)  HR: 112 (30 Jun 2018 02:00) (84 - 112)  BP: 147/86 (30 Jun 2018 02:00) (107/59 - 147/86)  BP(mean): 106 (30 Jun 2018 02:00) (72 - 106)  RR: 17 (30 Jun 2018 02:00) (12 - 18)  SpO2: 100% (30 Jun 2018 02:00) (100% - 100%)  CAPILLARY BLOOD GLUCOSE      POCT Blood Glucose.: 208 mg/dL (29 Jun 2018 21:53)  POCT Blood Glucose.: 186 mg/dL (29 Jun 2018 17:17)  POCT Blood Glucose.: 162 mg/dL (29 Jun 2018 14:37)  POCT Blood Glucose.: 159 mg/dL (29 Jun 2018 12:20)    Mode: AC/ CMV (Assist Control/ Continuous Mandatory Ventilation)  RR (machine): 12  TV (machine): 650  FiO2: 50  PEEP: 5  ITime: 1  MAP: 9.8  PIP: 19      LABS:    06-29    140  |  103  |  21  ----------------------------<  203<H>  4.4   |  22  |  1.11    Ca    8.0<L>      29 Jun 2018 21:56                            8.5    9.2   )-----------( 151      ( 29 Jun 2018 21:56 )             25.0       STROKE CORE MEASURES:      MEDICATION LEVELS:     IVF FLUIDS/MEDICATIONS: [X ] Reviewed  MEDICATIONS  (STANDING):  albumin human  5% IVPB 500 milliLiter(s) IV Intermittent once  albumin human  5% IVPB 1000 milliLiter(s) IV Intermittent once  atorvastatin 80 milliGRAM(s) Oral at bedtime  BUpivacaine liposome 1.3% Injectable (no eMAR) 20 milliLiter(s) Local Injection once  ceFAZolin   IVPB 2000 milliGRAM(s) IV Intermittent every 8 hours  dexamethasone  Injectable 4 milliGRAM(s) IV Push every 6 hours  dextrose 5%. 1000 milliLiter(s) (50 mL/Hr) IV Continuous <Continuous>  dextrose 50% Injectable 12.5 Gram(s) IV Push once  dextrose 50% Injectable 25 Gram(s) IV Push once  dextrose 50% Injectable 25 Gram(s) IV Push once  docusate sodium 100 milliGRAM(s) Oral three times a day  insulin lispro (HumaLOG) corrective regimen sliding scale   SubCutaneous Before meals and at bedtime  losartan 25 milliGRAM(s) Oral daily  norepinephrine Infusion 0.05 MICROgram(s)/kG/Min (12.375 mL/Hr) IV Continuous <Continuous>  senna 2 Tablet(s) Oral at bedtime  sodium chloride 0.9%. 1000 milliLiter(s) (100 mL/Hr) IV Continuous <Continuous>    MEDICATIONS  (PRN):  acetaminophen   Tablet 650 milliGRAM(s) Oral every 6 hours PRN For Temp greater than 38 C (100.4 F)  acetaminophen   Tablet. 650 milliGRAM(s) Oral every 6 hours PRN Mild Pain (1 - 3)  dextrose 40% Gel 15 Gram(s) Oral once PRN Blood Glucose LESS THAN 70 milliGRAM(s)/deciliter  diazepam    Tablet 5 milliGRAM(s) Oral every 8 hours PRN muscle spasm  glucagon  Injectable 1 milliGRAM(s) IntraMuscular once PRN Glucose LESS THAN 70 milligrams/deciliter  HYDROmorphone  Injectable 1 milliGRAM(s) IV Push every 3 hours PRN break through pain  ondansetron Injectable 4 milliGRAM(s) IV Push every 6 hours PRN Nausea  oxyCODONE    5 mG/acetaminophen 325 mG 1 Tablet(s) Oral every 4 hours PRN Moderate Pain  oxyCODONE    5 mG/acetaminophen 325 mG 2 Tablet(s) Oral every 6 hours PRN Severe Pain    I&O's Summary    29 Jun 2018 07:01  -  30 Jun 2018 02:35  --------------------------------------------------------  IN: 7150 mL / OUT: 2080 mL / NET: 5070 mL        EXAMINATION:  PHYSICAL EXAM:    Constitutional: No Acute Distress; intubated     Neurological: Awake, alert , Following Commands, PERRL, EOMI, No Gaze Preference, Face Symmetrical, ,     Motor exam:          Upper extremity                         Delt     Bicep     Tricep    HG                                                 R         5/5        5/5        5/5       5/5                                               L          5/5        5/5        5/5       5/5          Lower extremity                        HF         KF        KE       DF         PF                                                  R        5/5        5/5        5/5       5/5         5/5                                               L         5/5        5/5       5/5       5/5          5/5                                                 Sensation: [X ] intact to light touch      Pulmonary: Clear to Auscultation, No rales, No rhonchi, No wheezes     Cardiovascular: S1, S2, Regular rate and rhythm     Gastrointestinal: Soft, Non-tender, Non-distended     Extremities: No calf tenderness

## 2018-06-30 NOTE — PHYSICAL THERAPY INITIAL EVALUATION ADULT - MANUAL MUSCLE TESTING RESULTS, REHAB EVAL
in left hand 4/5 due to hx of carpal tunnel syndrome. Right hand 5/5. Ankle DF/PF 5/5 bilaterally. All other motions >3/5 upon functional assessment.

## 2018-06-30 NOTE — PROGRESS NOTE ADULT - ASSESSMENT
POD #1- T10 to pelvis instrumentation and fusion w/ plastics closure for spinal stenosis    Plan-  Neuro checks q 1 hr  PT/OT eval   Maintain SBP 10-< 150  Monitor surg drain output q hr  CXR/ ABG in am and start weaning parameters for extubation in am  NPO  for now   Resume home BP meds and statin if RENAE or NGT in place   SCD for DVT prophylaxsis and lovenox today  Imaging of T/L spine if requested by NSG team   Hyperglycemia - Check HGBA1C   FS q 6 hrs with SSI  Monitor CBC post op

## 2018-06-30 NOTE — PROGRESS NOTE ADULT - SUBJECTIVE AND OBJECTIVE BOX
SUBJECTIVE:  Pt was seen & examined.  No overnight events.     OBJECTIVE:     ** VITAL SIGNS / I&O's **    Vital Signs Last 24 Hrs  T(C): 37.1 (30 Jun 2018 09:07), Max: 37.1 (30 Jun 2018 09:07)  T(F): 98.7 (30 Jun 2018 09:07), Max: 98.7 (30 Jun 2018 09:07)  HR: 112 (30 Jun 2018 11:00) (84 - 114)  BP: 113/67 (30 Jun 2018 11:00) (107/59 - 147/86)  BP(mean): 82 (30 Jun 2018 11:00) (72 - 106)  RR: 27 (30 Jun 2018 11:00) (12 - 27)  SpO2: 94% (30 Jun 2018 11:00) (94% - 100%)      29 Jun 2018 07:01  -  30 Jun 2018 07:00  --------------------------------------------------------  IN:    Albumin 5%  - 250 mL: 2000 mL    IV PiggyBack: 200 mL    sodium chloride 0.9%: 5200 mL  Total IN: 7400 mL    OUT:    Bulb: 5 mL    Bulb: 10 mL    Evacuated Tube System: 140 mL    Evacuated Tube System: 100 mL    Indwelling Catheter - Urethral: 2535 mL  Total OUT: 2790 mL    Total NET: 4610 mL      30 Jun 2018 07:01  -  30 Jun 2018 11:16  --------------------------------------------------------  IN:    IV PiggyBack: 100 mL  Total IN: 100 mL    OUT:    Bulb: 20 mL    Bulb: 10 mL    Evacuated Tube System: 80 mL    Evacuated Tube System: 120 mL    Indwelling Catheter - Urethral: 240 mL  Total OUT: 470 mL    Total NET: -370 mL          ** PHYSICAL EXAM **    -- CONSTITUTIONAL: sedated/intubated  -- No resp distress  -- Back: dressing intact, no collection, no signs of infection  -- HV/jps serosang      ** LABS **                          8.6    12.4  )-----------( 175      ( 30 Jun 2018 05:23 )             25.7     30 Jun 2018 05:23    139    |  103    |  21     ----------------------------<  181    4.2     |  23     |  1.13     Ca    7.8        30 Jun 2018 05:23  Phos  3.5       30 Jun 2018 05:23  Mg     1.6       30 Jun 2018 05:23        CAPILLARY BLOOD GLUCOSE      POCT Blood Glucose.: 195 mg/dL (30 Jun 2018 06:02)  POCT Blood Glucose.: 208 mg/dL (29 Jun 2018 21:53)  POCT Blood Glucose.: 186 mg/dL (29 Jun 2018 17:17)  POCT Blood Glucose.: 162 mg/dL (29 Jun 2018 14:37)  POCT Blood Glucose.: 159 mg/dL (29 Jun 2018 12:20)        A/P: 71 yo M  s/p T10 to pelvis fusion with plastics closure on 06/29/2018    - cont current dressing  - cont drains  - Pain control  - further care per primary team  - will follow SUBJECTIVE:  Pt was seen & examined.  No overnight events.     OBJECTIVE:     ** VITAL SIGNS / I&O's **    Vital Signs Last 24 Hrs  T(C): 37.1 (30 Jun 2018 09:07), Max: 37.1 (30 Jun 2018 09:07)  T(F): 98.7 (30 Jun 2018 09:07), Max: 98.7 (30 Jun 2018 09:07)  HR: 112 (30 Jun 2018 11:00) (84 - 114)  BP: 113/67 (30 Jun 2018 11:00) (107/59 - 147/86)  BP(mean): 82 (30 Jun 2018 11:00) (72 - 106)  RR: 27 (30 Jun 2018 11:00) (12 - 27)  SpO2: 94% (30 Jun 2018 11:00) (94% - 100%)      29 Jun 2018 07:01  -  30 Jun 2018 07:00  --------------------------------------------------------  IN:    Albumin 5%  - 250 mL: 2000 mL    IV PiggyBack: 200 mL    sodium chloride 0.9%: 5200 mL  Total IN: 7400 mL    OUT:    Bulb: 5 mL    Bulb: 10 mL    Evacuated Tube System: 140 mL    Evacuated Tube System: 100 mL    Indwelling Catheter - Urethral: 2535 mL  Total OUT: 2790 mL    Total NET: 4610 mL      30 Jun 2018 07:01  -  30 Jun 2018 11:16  --------------------------------------------------------  IN:    IV PiggyBack: 100 mL  Total IN: 100 mL    OUT:    Bulb: 20 mL    Bulb: 10 mL    Evacuated Tube System: 80 mL    Evacuated Tube System: 120 mL    Indwelling Catheter - Urethral: 240 mL  Total OUT: 470 mL    Total NET: -370 mL          ** PHYSICAL EXAM **    -- CONSTITUTIONAL:  Awake/Alert  -- No resp distress  -- Back: dressing intact, no collection, no signs of infection  -- HV/jps serosang      ** LABS **                          8.6    12.4  )-----------( 175      ( 30 Jun 2018 05:23 )             25.7     30 Jun 2018 05:23    139    |  103    |  21     ----------------------------<  181    4.2     |  23     |  1.13     Ca    7.8        30 Jun 2018 05:23  Phos  3.5       30 Jun 2018 05:23  Mg     1.6       30 Jun 2018 05:23        CAPILLARY BLOOD GLUCOSE      POCT Blood Glucose.: 195 mg/dL (30 Jun 2018 06:02)  POCT Blood Glucose.: 208 mg/dL (29 Jun 2018 21:53)  POCT Blood Glucose.: 186 mg/dL (29 Jun 2018 17:17)  POCT Blood Glucose.: 162 mg/dL (29 Jun 2018 14:37)  POCT Blood Glucose.: 159 mg/dL (29 Jun 2018 12:20)        A/P: 71 yo M  s/p T10 to pelvis fusion with plastics closure on 06/29/2018    - cont current dressing  - cont drains  - Pain control  - further care per primary team  - will follow

## 2018-06-30 NOTE — PROGRESS NOTE ADULT - ASSESSMENT
72M with  1.  degenerative joint disease of the lumbar spine, spinal stenosis, s/p lumbar laminectomy (2017), s/p T10 to pelvis fusion with plastics closure (06/29/2018, Dr. Young, Dr. Arredondo, Dr. Rodriguez)  2.  HDN dyslipidemia  3.  peripheral arterial disease    PLAN:   NEURO: spine checks q1h, VS q1h, PRN pain meds with Tylenol / percocet / d/c dilaudid  spinal stenosis s/p fusion:  steroid taper as per neurosurgery  drains:    Delirium precautions:  d/c diazepam, provide cognitive activities TID, sleep protocol to normalize sleep-wake cycles; early mob and ROM exercises; remove catheters / restraints; eyeglass / hearing aids; maintain hydration  REHAB:  physical therapy evaluation and management    EARLY MOB:  HOB up    PULM:  CPAP - trial of extubation this morning  CARDIO:  SBP goal 100-150mm Hg, continue losartan? on levophed?  ENDO:  Blood sugar goals 140-180 mg/dL, continue insulin sliding scale, high-dose statins, check CMP  GI:  PPI for GI prophylaxis while on steroids  DIET: NPO for extubation  RENAL:  keep IVF for now  HEM/ONC: anemia, Hb stable  VTE Prophylaxis: SCDs, start SQH tonight if ok with neurosurgery, baseline LE Doppler for DVT suspected on admission (spinal disease, decreased mobility)  ID: afebrile, no leukocytosis; periop ancef then d/c  Social: will update family    ATTENDING ATTESTATION:  I was physically present for the key portions of the evaluation and management (E/M) service provided.  I agree with the above history, physical and plan, which I have reviewed and edited where appropriate.    Patient at high risk for neurological deterioration or death due to:  ICU delirium, aspiration PNA, DVT / PE.  Critical care time, excluding procedures: 60 minutes spent on total encounter, more than 50% of the visit was spent counseling and/or coordinating care by the attending physician.     Plan discussed with RN, house staff. 72M with  1.  degenerative joint disease of the lumbar spine, spinal stenosis, s/p lumbar laminectomy (2017), s/p T10 to pelvis fusion with plastics closure (06/29/2018, Dr. Young, Dr. Arredondo, Dr. Rodriguez)  2.  HDN dyslipidemia  3.  peripheral arterial disease    PLAN:   NEURO: spine checks q1h, VS q1h, PRN pain meds with Tylenol / percocet / d/c dilaudid  spinal stenosis s/p fusion:  steroid taper as per neurosurgery  drains:    Delirium precautions:  d/c diazepam, provide cognitive activities TID, sleep protocol to normalize sleep-wake cycles; early mob and ROM exercises; remove catheters / restraints; eyeglass / hearing aids; maintain hydration  REHAB:  physical therapy evaluation and management    EARLY MOB:  HOB up    PULM:  CPAP - trial of extubation this morning  CARDIO:  SBP goal 100-150mm Hg, on levophed x 1 hour post-op, but off since last night, BP stable; d/c losartan for now  ENDO:  Blood sugar goals 140-180 mg/dL, continue insulin sliding scale, high-dose statins, check CMP  GI:  PPI for GI prophylaxis while on steroids  DIET: NPO for extubation  RENAL:  keep IVF for now  HEM/ONC: anemia, Hb stable  VTE Prophylaxis: SCDs, start SQH tonight if ok with neurosurgery, baseline LE Doppler for DVT suspected on admission (spinal disease, decreased mobility)  ID: afebrile, no leukocytosis; periop ancef then d/c  Social: will update family    ATTENDING ATTESTATION:  I was physically present for the key portions of the evaluation and management (E/M) service provided.  I agree with the above history, physical and plan, which I have reviewed and edited where appropriate.    Patient at high risk for neurological deterioration or death due to:  ICU delirium, aspiration PNA, DVT / PE.  Critical care time, excluding procedures: 60 minutes spent on total encounter, more than 50% of the visit was spent counseling and/or coordinating care by the attending physician.     Plan discussed with RN, house staff. 72M with  1.  degenerative joint disease of the lumbar spine, spinal stenosis, s/p lumbar laminectomy (2017), s/p T10 to pelvis fusion with plastics closure (06/29/2018, Dr. Young, Dr. Arredondo, Dr. Rodriguez)  2.  HDN dyslipidemia  3.  peripheral arterial disease    PLAN:   NEURO: spine checks q1h, VS q1h, PRN pain meds with Tylenol, dilaudid  spinal stenosis s/p fusion:  steroid taper as per neurosurgery  drains:  keep for now  Delirium precautions:  d/c diazepam, provide cognitive activities TID, sleep protocol to normalize sleep-wake cycles; early mob and ROM exercises; remove catheters / restraints; eyeglass / hearing aids; maintain hydration  REHAB:  physical therapy evaluation and management    EARLY MOB:  HOB up    PULM:  extubated, PRN O2 support to keep sats >/=92%; incentive spirometry  CARDIO:  SBP goal 100-150mm Hg, on levophed x 1 hour post-op, but off since last night, BP stable; d/c losartan for now  ENDO:  Blood sugar goals 140-180 mg/dL, continue insulin sliding scale, high-dose statins, check CMP  GI:  PPI for GI prophylaxis while on steroids  DIET: NPO for extubation  RENAL:  IVL for now, watch UO   HEM/ONC: anemia, Hb stable  VTE Prophylaxis: SCDs, no DVT chemoprophylaxis for now as patient is high risk for bleed (late case, fresh post-op); baseline LE Doppler for DVT suspected on admission (spinal disease, decreased mobility)  ID: afebrile, no leukocytosis; periop ancef then d/c  Social: will update family    ATTENDING ATTESTATION:  I was physically present for the key portions of the evaluation and management (E/M) service provided.  I agree with the above history, physical and plan, which I have reviewed and edited where appropriate.    Patient at high risk for neurological deterioration or death due to:  ICU delirium, aspiration PNA, DVT / PE.  Critical care time, excluding procedures: 60 minutes spent on total encounter, more than 50% of the visit was spent counseling and/or coordinating care by the attending physician.     Plan discussed with RN, house staff.

## 2018-06-30 NOTE — PHYSICAL THERAPY INITIAL EVALUATION ADULT - CRITERIA FOR SKILLED THERAPEUTIC INTERVENTIONS
impairments found/anticipated equipment needs at discharge/therapy frequency/rehab potential/anticipated discharge recommendation

## 2018-06-30 NOTE — PROGRESS NOTE ADULT - SUBJECTIVE AND OBJECTIVE BOX
=================================  NEUROCRITICAL CARE ATTENDING NOTE  =================================    NANDO HERNANDEZ   [id number]  Summary:  72M with Hypertension dyslipidemia spinal stenosis, s/p lumbar laminectomy ().  Admitted for elective T10-pelvis fusion .  Late case post-op, remained intubated overnight, transferred to NSICU.  Overnight Events: No significant events overnight, admitted to NSICU.    Past Medical History: PAD (peripheral artery disease) Hypercholesterolemia Hypertension   Allergies:  No Known Allergies  Home meds: losartan 25mg daily crestor 20mg HS asa 81mg daily     PHYSICAL EXAMINATION  T(C): 36.9 ( @ 05:32), Max: 36.9 ( @ 05:32) HR: 112 ( @ 08:00) (84 - 114) BP: 130/66 ( @ 08:00) (107/59 - 147/86) RR: 19 ( @ 08:00) (12 - 19) SpO2: 100% ( @ 08:00) (100% - 100%)  NEUROLOGIC EXAMINATION:  Patient is awake, alert, fully oriented, pupils 2-3mm equal and briskly reactive to light, EOMs intact, muscle strength 5/5 on all 4 extremities  GENERAL:  intubated, on CPAP 0/5, comfortable  EENT: anicteric  CARDIOVASC:  (+) S1 S2, tachycardic and regular rhythm  PULMONARY:  clear to auscultation bilaterally  ABDOMEN:  soft, nontender, with normoactive bowel sounds  EXTREMITIES:  no edema  SKIN:  no rash    LABS:  CAPILLARY BLOOD GLUCOSE 195 208 186 162 159               8.6    12.4  )-----------( 175      ( 2018 05:23 )             25.7     139  |  103  |  21  ----------------------------<  181<H>  4.2   |  23  |  1.13    Ca    7.8<L>      2018 05:23  Phos  3.5     06-30  Mg     1.6      @ 07:01  -   @ 07:00  IN: 7400 mL / OUT: 2790 mL / NET: 4610 mL    Bacteriology:  CSF studies:  EEG:  Neuroimagin/23 spine Xray- multilevel degenerative changes, apex at L2-3; R hip arthroplasty  Other imaging:    MEDICATIONS: atorvastatin 80mg HS bacitracin ointment BID ancef 2g IV q8h dexamethasone 4mg IV q6h docusate 100 TID mod ISS losartan 25mg daily pantoprazole 40 IV daily senna HS diazepam 5mg PO q8h PRN muscle spasm, hydromorphone 1mg IV q3h PRN percocet PRN   levophed?  albumin?    IV FLUIDS:  DRIPS:  DIET:  Lines:   Drains:   2 bulbs 5 and 10 over 24 hours, evacuated tube systems x2 140 and 100 cc over 24 hours; Iv[place]   Wounds:    CODE STATUS:  Full Code                       GOALS OF CARE:  aggressive                      DISPOSITION:  ICU =================================  NEUROCRITICAL CARE ATTENDING NOTE  =================================    NANDO HERNANDEZ   MRN-[id number]  Summary:  72M with Hypertension dyslipidemia spinal stenosis, s/p lumbar laminectomy ().  Admitted for elective T10-pelvis fusion .  Late case post-op, remained intubated overnight, transferred to NSICU.  Overnight Events: No significant events overnight, admitted to NSICU.    Past Medical History: PAD (peripheral artery disease) Hypercholesterolemia Hypertension   Allergies:  No Known Allergies  Home meds: losartan 25mg daily crestor 20mg HS asa 81mg daily     PHYSICAL EXAMINATION  T(C): 36.9 ( @ 05:32), Max: 36.9 ( @ 05:32) HR: 112 ( @ 08:00) (84 - 114) BP: 130/66 ( @ 08:00) (107/59 - 147/86) RR: 19 ( @ 08:00) (12 - 19) SpO2: 100% ( @ 08:00) (100% - 100%)  NEUROLOGIC EXAMINATION:  Patient is awake, alert, fully oriented, following commands, pupils 3mm equal and briskly reactive to light, EOMs intact, moves all 4 s with good strength  GENERAL:  intubated, on CPAP 0/5, comfortable  EENT: anicteric  CARDIOVASC:  (+) S1 S2, tachycardic and regular rhythm  PULMONARY:  clear to auscultation bilaterally  ABDOMEN:  soft, nontender, with normoactive bowel sounds  EXTREMITIES:  no edema  SKIN:  no rash    LABS:  CAPILLARY BLOOD GLUCOSE 195 208 186 162 159               8.6    12.4  )-----------( 175      ( 2018 05:23 )             25.7     139  |  103  |  21  ----------------------------<  181<H>  4.2   |  23  |  1.13    Ca    7.8<L>      2018 05:23  Phos  3.5     06-30  Mg     1.6      @ 07:01  -   @ 07:00  IN: 7400 mL / OUT: 2790 mL / NET: 4610 mL    Bacteriology:  CSF studies:  EEG:  Neuroimagin/23 spine Xray- multilevel degenerative changes, apex at L2-3; R hip arthroplasty  Other imaging:    MEDICATIONS: atorvastatin 80mg HS bacitracin ointment BID ancef 2g IV q8h dexamethasone 4mg IV q6h docusate 100 TID mod ISS losartan 25mg daily pantoprazole 40 IV daily senna HS diazepam 5mg PO q8h PRN muscle spasm, hydromorphone 1mg IV q3h PRN percocet PRN     IV FLUIDS:  DRIPS: levophed   DIET:  Lines:   Drains:   2 bulbs 5 and 10 over 24 hours, evacuated tube systems x2 140 and 100 cc over 24 hours; [place]   Wounds:    CODE STATUS:  Full Code                       GOALS OF CARE:  aggressive                      DISPOSITION:  ICU =================================  NEUROCRITICAL CARE ATTENDING NOTE  =================================    NANDO HERNANDEZ   MRN-[id number]  Summary:  72M with Hypertension dyslipidemia spinal stenosis, s/p lumbar laminectomy ().  Admitted for elective T10-pelvis fusion .  Late case post-op, remained intubated overnight, transferred to NSICU.  Overnight Events: No significant events overnight, admitted to NSICU; intraop received 6.5 L fluids, 1200 output, 800 EBL, given furosmide 20 x1 at 4 a.m.    Past Medical History: PAD (peripheral artery disease) Hypercholesterolemia Hypertension   Allergies:  No Known Allergies  Home meds: losartan 25mg daily crestor 20mg HS asa 81mg daily     PHYSICAL EXAMINATION  T(C): 36.9 ( @ 05:32), Max: 36.9 ( @ 05:32) HR: 112 ( @ 08:00) (84 - 114) BP: 130/66 ( @ 08:00) (107/59 - 147/86) RR: 19 ( @ 08:00) (12 - 19) SpO2: 100% ( @ 08:00) (100% - 100%)  NEUROLOGIC EXAMINATION:  Patient is awake, alert, fully oriented, following commands, pupils 3mm equal and briskly reactive to light, EOMs intact, moves all 4 s with good strength  GENERAL:  intubated, on CPAP 0/5, comfortable  EENT: anicteric  CARDIOVASC:  (+) S1 S2, tachycardic and regular rhythm  PULMONARY:  clear to auscultation bilaterally  ABDOMEN:  soft, nontender, with normoactive bowel sounds  EXTREMITIES:  no edema  SKIN:  no rash    LABS:  CAPILLARY BLOOD GLUCOSE 195 208 186 162 159               8.6    12.4  )-----------( 175      ( 2018 05:23 )             25.7     139  |  103  |  21  ----------------------------<  181<H>  4.2   |  23  |  1.13    Ca    7.8<L>      2018 05:23  Phos  3.5     06-30  Mg     1.6      @ 07: @ 07:00  IN: 7400 mL / OUT: 2790 mL / NET: 4610 mL    Bacteriology:  CSF studies:  EEG:  Neuroimagin/23 spine Xray- multilevel degenerative changes, apex at L2-3; R hip arthroplasty  Other imaging:    MEDICATIONS: atorvastatin 80mg HS bacitracin ointment BID ancef 2g IV q8h dexamethasone 4mg IV q6h docusate 100 TID mod ISS losartan 25mg daily pantoprazole 40 IV daily senna HS diazepam 5mg PO q8h PRN muscle spasm, hydromorphone 1mg IV q3h PRN percocet PRN     IV FLUIDS: IVL  DRIPS: levophed - off since last night  DIET: NPO  Lines: [place]  Drains:   2 bulbs 5 and 10 over 24 hours, evacuated tube systems x2 140 and 100 cc over 24 hours; [place]   Wounds:    CODE STATUS:  Full Code                       GOALS OF CARE:  aggressive                      DISPOSITION:  ICU

## 2018-06-30 NOTE — PHYSICAL THERAPY INITIAL EVALUATION ADULT - IMPAIRMENTS CONTRIBUTING TO GAIT DEVIATIONS, PT EVAL
pain/decreased strength/*distance limited by patient feeling mildly dizzy throughout session. BP orthostatic at end of session.

## 2018-06-30 NOTE — PROGRESS NOTE ADULT - SUBJECTIVE AND OBJECTIVE BOX
HPI:  71 y/o male pmhx HTN, HLD, spinal stenosis s/p lumbar laminectomy last year presents today for elective T10-pelvis fusin today. Patient reports chronic LBP and ambulates with cane assistance. He denies any falls but reports imbalance with ambulating. He denies any leg pain, weakness, numbness or tingling. (29 Jun 2018 07:19)    Hospital course:   POD 0: transferred to ICU intubated, kept on sedation  POD 1: wean to extubate, no acute events overnight, maintained comfort on propofol and fent. lasix 10 IVP x 1 overnight    OVERNIGHT EVENTS:  Vital Signs Last 24 Hrs  T(C): 36.9 (30 Jun 2018 05:32), Max: 36.9 (30 Jun 2018 05:32)  T(F): 98.4 (30 Jun 2018 05:32), Max: 98.4 (30 Jun 2018 05:32)  HR: 111 (30 Jun 2018 06:05) (84 - 114)  BP: 131/71 (30 Jun 2018 05:00) (107/59 - 147/86)  BP(mean): 88 (30 Jun 2018 05:00) (72 - 106)  RR: 14 (30 Jun 2018 05:00) (12 - 18)  SpO2: 100% (30 Jun 2018 06:05) (100% - 100%)    I&O's Detail    29 Jun 2018 07:01  -  30 Jun 2018 06:47  --------------------------------------------------------  IN:    Albumin 5%  - 250 mL: 2000 mL    IV PiggyBack: 50 mL    sodium chloride 0.9%: 5200 mL  Total IN: 7250 mL    OUT:    Bulb: 5 mL    Bulb: 10 mL    Evacuated Tube System: 140 mL    Evacuated Tube System: 100 mL    Indwelling Catheter - Urethral: 2310 mL  Total OUT: 2565 mL    Total NET: 4685 mL        I&O's Summary    29 Jun 2018 07:01  -  30 Jun 2018 06:47  --------------------------------------------------------  IN: 7250 mL / OUT: 2565 mL / NET: 4685 mL        PHYSICAL EXAM:  Neurological:  intubated comfortable  EOMI, PERRL  LACY x 4, symmetrically  follows commands  distal pulses intact  diffuse peripheral edema    TUBES/LINES:  [] CVC  [x] A-line  [] Lumbar Drain  [] Ventriculostomy  [] Other    DIET:  [x] NPO  [] Mechanical  [] Tube feeds    LABS:                        8.6    12.4  )-----------( 175      ( 30 Jun 2018 05:23 )             25.7     06-30    139  |  103  |  21  ----------------------------<  181<H>  4.2   |  23  |  1.13    Ca    7.8<L>      30 Jun 2018 05:23  Phos  3.5     06-30  Mg     1.6     06-30              CAPILLARY BLOOD GLUCOSE      POCT Blood Glucose.: 195 mg/dL (30 Jun 2018 06:02)  POCT Blood Glucose.: 208 mg/dL (29 Jun 2018 21:53)  POCT Blood Glucose.: 186 mg/dL (29 Jun 2018 17:17)  POCT Blood Glucose.: 162 mg/dL (29 Jun 2018 14:37)  POCT Blood Glucose.: 159 mg/dL (29 Jun 2018 12:20)      Drug Levels: [] N/A    CSF Analysis: [] N/A      Allergies    No Known Allergies    Intolerances      MEDICATIONS:  Antibiotics:  ceFAZolin   IVPB 2000 milliGRAM(s) IV Intermittent every 8 hours    Neuro:  acetaminophen   Tablet 650 milliGRAM(s) Oral every 6 hours PRN  acetaminophen   Tablet. 650 milliGRAM(s) Oral every 6 hours PRN  diazepam    Tablet 5 milliGRAM(s) Oral every 8 hours PRN  HYDROmorphone  Injectable 1 milliGRAM(s) IV Push every 3 hours PRN  ondansetron Injectable 4 milliGRAM(s) IV Push every 6 hours PRN  oxyCODONE    5 mG/acetaminophen 325 mG 1 Tablet(s) Oral every 4 hours PRN  oxyCODONE    5 mG/acetaminophen 325 mG 2 Tablet(s) Oral every 6 hours PRN    Anticoagulation:    OTHER:  atorvastatin 80 milliGRAM(s) Oral at bedtime  BACItracin   Ointment 1 Application(s) Topical two times a day  BUpivacaine liposome 1.3% Injectable (no eMAR) 20 milliLiter(s) Local Injection once  dexamethasone  Injectable 4 milliGRAM(s) IV Push every 6 hours  dextrose 40% Gel 15 Gram(s) Oral once PRN  dextrose 50% Injectable 12.5 Gram(s) IV Push once  dextrose 50% Injectable 25 Gram(s) IV Push once  dextrose 50% Injectable 25 Gram(s) IV Push once  docusate sodium 100 milliGRAM(s) Oral three times a day  glucagon  Injectable 1 milliGRAM(s) IntraMuscular once PRN  insulin lispro (HumaLOG) corrective regimen sliding scale   SubCutaneous Before meals and at bedtime  losartan 25 milliGRAM(s) Oral daily  norepinephrine Infusion 0.05 MICROgram(s)/kG/Min IV Continuous <Continuous>  senna 2 Tablet(s) Oral at bedtime    IVF:  albumin human  5% IVPB 500 milliLiter(s) IV Intermittent once  albumin human  5% IVPB 1000 milliLiter(s) IV Intermittent once  dextrose 5%. 1000 milliLiter(s) IV Continuous <Continuous>    CULTURES:    RADIOLOGY & ADDITIONAL TESTS:      ASSESSMENT:  72y Male s/p T10- pelvis POD 1 neuro intact    PLAN:  NEURO:  q1h neuro and spine checks  pain control  wean sedation to extubate  ct pelvis at some point  cont steroids    CARDIOVASCULAR:  RRR  a line for monitoring    PULMONARY:  extubate as tolerated  CPAP trial     RENAL:  HLIV for     GI:  NPO for extubation  PPI    HEME:  stable  trend drain outputs    ID:  afeb  finish post op abx ppx    ENDO:  ISS    DVT PROPHYLAXIS:  [x] Venodynes                                [] Heparin/Lovenox    DISPOSITION:   ICU status  full code  pt/ot pending  d/w Dr. Young and ICU house staff

## 2018-06-30 NOTE — PHYSICAL THERAPY INITIAL EVALUATION ADULT - ADDITIONAL COMMENTS
Patient reports that he has a ramp to access the building and a chair lift to the bed/bath upstairs. Has a cane at home. Wife has rolling walker. Active .

## 2018-06-30 NOTE — PHYSICAL THERAPY INITIAL EVALUATION ADULT - PERTINENT HX OF CURRENT PROBLEM, REHAB EVAL
72M with Hypertension dyslipidemia spinal stenosis, s/p lumbar laminectomy (2017).  Admitted for elective T10-pelvis fusion 06/29.

## 2018-07-01 LAB
ANION GAP SERPL CALC-SCNC: 10 MMOL/L — SIGNIFICANT CHANGE UP (ref 5–17)
BUN SERPL-MCNC: 29 MG/DL — HIGH (ref 7–23)
CALCIUM SERPL-MCNC: 8 MG/DL — LOW (ref 8.4–10.5)
CHLORIDE SERPL-SCNC: 104 MMOL/L — SIGNIFICANT CHANGE UP (ref 96–108)
CO2 SERPL-SCNC: 26 MMOL/L — SIGNIFICANT CHANGE UP (ref 22–31)
CREAT SERPL-MCNC: 1.2 MG/DL — SIGNIFICANT CHANGE UP (ref 0.5–1.3)
GLUCOSE BLDC GLUCOMTR-MCNC: 148 MG/DL — HIGH (ref 70–99)
GLUCOSE BLDC GLUCOMTR-MCNC: 157 MG/DL — HIGH (ref 70–99)
GLUCOSE BLDC GLUCOMTR-MCNC: 176 MG/DL — HIGH (ref 70–99)
GLUCOSE BLDC GLUCOMTR-MCNC: 200 MG/DL — HIGH (ref 70–99)
GLUCOSE BLDC GLUCOMTR-MCNC: 207 MG/DL — HIGH (ref 70–99)
GLUCOSE SERPL-MCNC: 160 MG/DL — HIGH (ref 70–99)
HCT VFR BLD CALC: 22.5 % — LOW (ref 39–50)
HCT VFR BLD CALC: 24.8 % — LOW (ref 39–50)
HGB BLD-MCNC: 7.2 G/DL — LOW (ref 13–17)
HGB BLD-MCNC: 8.2 G/DL — LOW (ref 13–17)
LYMPHOCYTES # BLD AUTO: 7.8 % — LOW (ref 13–44)
MAGNESIUM SERPL-MCNC: 2.4 MG/DL — SIGNIFICANT CHANGE UP (ref 1.6–2.6)
MCHC RBC-ENTMCNC: 30.4 PG — SIGNIFICANT CHANGE UP (ref 27–34)
MCHC RBC-ENTMCNC: 31.1 PG — SIGNIFICANT CHANGE UP (ref 27–34)
MCHC RBC-ENTMCNC: 32 G/DL — SIGNIFICANT CHANGE UP (ref 32–36)
MCHC RBC-ENTMCNC: 33.1 G/DL — SIGNIFICANT CHANGE UP (ref 32–36)
MCV RBC AUTO: 93.9 FL — SIGNIFICANT CHANGE UP (ref 80–100)
MCV RBC AUTO: 94.9 FL — SIGNIFICANT CHANGE UP (ref 80–100)
MONOCYTES NFR BLD AUTO: 11.2 % — SIGNIFICANT CHANGE UP (ref 2–14)
NEUTROPHILS NFR BLD AUTO: 81 % — HIGH (ref 43–77)
PHOSPHATE SERPL-MCNC: 2 MG/DL — LOW (ref 2.5–4.5)
PLATELET # BLD AUTO: 152 K/UL — SIGNIFICANT CHANGE UP (ref 150–400)
PLATELET # BLD AUTO: 156 K/UL — SIGNIFICANT CHANGE UP (ref 150–400)
POTASSIUM SERPL-MCNC: 4.4 MMOL/L — SIGNIFICANT CHANGE UP (ref 3.5–5.3)
POTASSIUM SERPL-SCNC: 4.4 MMOL/L — SIGNIFICANT CHANGE UP (ref 3.5–5.3)
RBC # BLD: 2.37 M/UL — LOW (ref 4.2–5.8)
RBC # BLD: 2.64 M/UL — LOW (ref 4.2–5.8)
RBC # FLD: 14.4 % — SIGNIFICANT CHANGE UP (ref 10.3–16.9)
RBC # FLD: 14.9 % — SIGNIFICANT CHANGE UP (ref 10.3–16.9)
SODIUM SERPL-SCNC: 140 MMOL/L — SIGNIFICANT CHANGE UP (ref 135–145)
WBC # BLD: 12.5 K/UL — HIGH (ref 3.8–10.5)
WBC # BLD: 14.6 K/UL — HIGH (ref 3.8–10.5)
WBC # FLD AUTO: 12.5 K/UL — HIGH (ref 3.8–10.5)
WBC # FLD AUTO: 14.6 K/UL — HIGH (ref 3.8–10.5)

## 2018-07-01 PROCEDURE — 99233 SBSQ HOSP IP/OBS HIGH 50: CPT | Mod: 24

## 2018-07-01 RX ORDER — DEXAMETHASONE 0.5 MG/5ML
2 ELIXIR ORAL EVERY 6 HOURS
Qty: 0 | Refills: 0 | Status: COMPLETED | OUTPATIENT
Start: 2018-07-01 | End: 2018-07-02

## 2018-07-01 RX ORDER — INSULIN GLARGINE 100 [IU]/ML
10 INJECTION, SOLUTION SUBCUTANEOUS AT BEDTIME
Qty: 0 | Refills: 0 | Status: DISCONTINUED | OUTPATIENT
Start: 2018-07-01 | End: 2018-07-11

## 2018-07-01 RX ORDER — DEXAMETHASONE 0.5 MG/5ML
2 ELIXIR ORAL EVERY 6 HOURS
Qty: 0 | Refills: 0 | Status: DISCONTINUED | OUTPATIENT
Start: 2018-07-01 | End: 2018-07-01

## 2018-07-01 RX ORDER — DEXAMETHASONE 0.5 MG/5ML
ELIXIR ORAL
Qty: 0 | Refills: 0 | Status: DISCONTINUED | OUTPATIENT
Start: 2018-07-01 | End: 2018-07-01

## 2018-07-01 RX ORDER — DEXAMETHASONE 0.5 MG/5ML
1 ELIXIR ORAL EVERY 6 HOURS
Qty: 0 | Refills: 0 | Status: COMPLETED | OUTPATIENT
Start: 2018-07-02 | End: 2018-07-03

## 2018-07-01 RX ORDER — DEXAMETHASONE 0.5 MG/5ML
ELIXIR ORAL
Qty: 0 | Refills: 0 | Status: COMPLETED | OUTPATIENT
Start: 2018-07-01 | End: 2018-07-04

## 2018-07-01 RX ORDER — ENOXAPARIN SODIUM 100 MG/ML
40 INJECTION SUBCUTANEOUS AT BEDTIME
Qty: 0 | Refills: 0 | Status: DISCONTINUED | OUTPATIENT
Start: 2018-07-01 | End: 2018-07-11

## 2018-07-01 RX ORDER — DEXAMETHASONE 0.5 MG/5ML
1 ELIXIR ORAL EVERY 12 HOURS
Qty: 0 | Refills: 0 | Status: COMPLETED | OUTPATIENT
Start: 2018-07-04 | End: 2018-07-04

## 2018-07-01 RX ADMIN — Medication 4 MILLIGRAM(S): at 06:04

## 2018-07-01 RX ADMIN — Medication 100 MILLIGRAM(S): at 18:17

## 2018-07-01 RX ADMIN — ENOXAPARIN SODIUM 40 MILLIGRAM(S): 100 INJECTION SUBCUTANEOUS at 21:19

## 2018-07-01 RX ADMIN — HYDROMORPHONE HYDROCHLORIDE 1 MILLIGRAM(S): 2 INJECTION INTRAMUSCULAR; INTRAVENOUS; SUBCUTANEOUS at 05:37

## 2018-07-01 RX ADMIN — Medication 2 MILLIGRAM(S): at 23:00

## 2018-07-01 RX ADMIN — OXYCODONE AND ACETAMINOPHEN 2 TABLET(S): 5; 325 TABLET ORAL at 21:30

## 2018-07-01 RX ADMIN — Medication 4: at 11:14

## 2018-07-01 RX ADMIN — OXYCODONE AND ACETAMINOPHEN 2 TABLET(S): 5; 325 TABLET ORAL at 20:28

## 2018-07-01 RX ADMIN — ATORVASTATIN CALCIUM 80 MILLIGRAM(S): 80 TABLET, FILM COATED ORAL at 21:19

## 2018-07-01 RX ADMIN — SENNA PLUS 2 TABLET(S): 8.6 TABLET ORAL at 21:19

## 2018-07-01 RX ADMIN — HYDROMORPHONE HYDROCHLORIDE 1 MILLIGRAM(S): 2 INJECTION INTRAMUSCULAR; INTRAVENOUS; SUBCUTANEOUS at 06:04

## 2018-07-01 RX ADMIN — Medication 2 MILLIGRAM(S): at 18:19

## 2018-07-01 RX ADMIN — Medication 2: at 06:06

## 2018-07-01 RX ADMIN — Medication 2: at 18:17

## 2018-07-01 RX ADMIN — Medication 100 MILLIGRAM(S): at 06:05

## 2018-07-01 RX ADMIN — Medication 85 MILLIMOLE(S): at 08:36

## 2018-07-01 RX ADMIN — Medication 100 MILLIGRAM(S): at 11:14

## 2018-07-01 RX ADMIN — OXYCODONE AND ACETAMINOPHEN 2 TABLET(S): 5; 325 TABLET ORAL at 09:30

## 2018-07-01 RX ADMIN — INSULIN GLARGINE 10 UNIT(S): 100 INJECTION, SOLUTION SUBCUTANEOUS at 22:59

## 2018-07-01 RX ADMIN — PANTOPRAZOLE SODIUM 40 MILLIGRAM(S): 20 TABLET, DELAYED RELEASE ORAL at 21:19

## 2018-07-01 RX ADMIN — OXYCODONE AND ACETAMINOPHEN 2 TABLET(S): 5; 325 TABLET ORAL at 08:35

## 2018-07-01 NOTE — PROGRESS NOTE ADULT - SUBJECTIVE AND OBJECTIVE BOX
=================================  NEUROCRITICAL CARE ATTENDING NOTE  =================================    NANDO HERNANDEZ   MRN-[id number]  Summary:  72M with Hypertension dyslipidemia spinal stenosis, s/p lumbar laminectomy ().  Admitted for elective T10-pelvis fusion .  Late case post-op, remained intubated overnight, transferred to NSICU.  Overnight Events: No significant events overnight    Past Medical History: PAD (peripheral artery disease) Hypercholesterolemia Hypertension   Allergies:  No Known Allergies  Home meds: losartan 25mg daily crestor 20mg HS asa 81mg daily     PHYSICAL EXAMINATION  T(C): 37.4 ( @ 05:49), Max: 37.4 ( @ 05:49) HR: 102 ( @ 07:00) (96 - 120) BP: 140/59 ( @ 07:00) (97/50 - 170/57) RR: 22 ( @ 07:00) (12 - 31) SpO2: 91% ( @ 07:00) (89% - 100%)   NEUROLOGIC EXAMINATION:  Patient is awake, alert, fully oriented, following commands, pupils 3mm equal and briskly reactive to light, EOMs intact, moves all 4 s with good strength  GENERAL:  intubated, on CPAP 0/5, comfortable  EENT: anicteric  CARDIOVASC:  (+) S1 S2, tachycardic and regular rhythm  PULMONARY:  clear to auscultation bilaterally  ABDOMEN:  soft, nontender, with normoactive bowel sounds  EXTREMITIES:  no edema  SKIN:  no rash    LABS:  CAPILLARY BLOOD GLUCOSE 157 194 167 192               7.2    12.5  )-----------( 152      ( 2018 05:40 )             22.5     140  |  104  |  29<H>  ----------------------------<  160<H>  4.4   |  26  |  1.20    Ca    8.0<L>      2018 05:40  Phos  2.0     07-  Mg     2.4     07-    TPro  5.3<L>  /  Alb  3.9  /  TBili  0.3  /  DBili  <0.2  /  AST  49<H>  /  ALT  38  /  AlkPhos  28<L>   @ 07:01  -   @ 07:00  IN: 950 mL / OUT: 1760 mL / NET: -810 mL    Bacteriology:  CSF studies:  EEG:  Neuroimagin/23 spine Xray- multilevel degenerative changes, apex at L2-3; R hip arthroplasty  Other imagin/30 CXR: R effusion    MEDICATIONS: atorvastatin 80mg HS dexamethasone 4mg PO q6h docusate 100 TID SQL 40 HS dilaudid PRN mod ISS percocet PRN pantoprazole 40 daily senna 2mg HS     IV FLUIDS: IVL  DRIPS:   DIET: NPO  Lines: [place]  Drains:   Bulb: 40 mL   Bulb: 160 mL   Evacuated Tube System: 205 mL   Evacuated Tube System: 210 mL  Wounds:    CODE STATUS:  Full Code                       GOALS OF CARE:  aggressive                      DISPOSITION:  ICU =================================  NEUROCRITICAL CARE ATTENDING NOTE  =================================    NANDO HERNANDEZ   MRN-[id number]  Summary:  72M with Hypertension dyslipidemia spinal stenosis, s/p lumbar laminectomy ().  Admitted for elective T10-pelvis fusion .  Late case post-op, remained intubated overnight, transferred to NSICU.  Overnight Events: Hb drop to 7.2, orthostatic yesterday, tachycardic overnight; started on 1 unit pRBC this morning  REVIEW OF SYSTEMS:  No headaches, no nausea or vomiting; 14 -point review of systems otherwise unremarkable.    Past Medical History: PAD (peripheral artery disease) Hypercholesterolemia Hypertension   Allergies:  No Known Allergies  Home meds: losartan 25mg daily crestor 20mg HS asa 81mg daily     PHYSICAL EXAMINATION  T(C): 37.4 ( @ 05:49), Max: 37.4 ( @ 05:49) HR: 102 ( @ 07:00) (96 - 120) BP: 140/59 ( @ 07:00) (97/50 - 170/57) RR: 22 ( @ 07:00) (12 - 31) SpO2: 91% ( @ 07:00) (89% - 100%)   NEUROLOGIC EXAMINATION:  Patient is awake, alert, fully oriented, following commands, pupils 3mm equal and briskly reactive to light, EOMs intact, moves all 4 s with good strength; R LE 4+/5  GENERAL:  room air   EENT: anicteric  CARDIOVASC:  (+) S1 S2, tachycardic and regular rhythm  PULMONARY:  clear to auscultation bilaterally  ABDOMEN:  soft, nontender, with normoactive bowel sounds  EXTREMITIES:  no edema  SKIN:  no rash    LABS:  CAPILLARY BLOOD GLUCOSE 157 194 167 192    (12.4)    7.2   (8.6)  12.5  )-----------( 152      ( 2018 05:40 )             22.5     140  |  104  |  29<H>  ----------------------------<  160<H>  4.4   |  26  |  1.20  (1.20)    Ca    8.0<L>      2018 05:40  Phos  2.0       Mg     2.4         TPro  5.3<L>  /  Alb  3.9  /  TBili  0.3  /  DBili  <0.2  /  AST  49<H>  /  ALT  38  /  AlkPhos  28<L>   @ 07:01  -   @ 07:00  IN: 950 mL / OUT: 1760 mL / NET: -810 mL    Bacteriology:  CSF studies:  EEG:  Neuroimagin/23 spine Xray- multilevel degenerative changes, apex at L2-3; R hip arthroplasty  Other imagin/30 CXR: R effusion    MEDICATIONS: atorvastatin 80mg HS dexamethasone 4mg PO q6h docusate 100 TID SQL 40 HS dilaudid PRN mod ISS percocet PRN pantoprazole 40 daily senna 2mg HS     IV FLUIDS: IVL  DRIPS:   DIET: regular  Lines:   Drains:   Bulb: 40 mL   Bulb: 160 mL   Evacuated Tube System: 205 mL   Evacuated Tube System: 210 mL  Wounds:    CODE STATUS:  Full Code                       GOALS OF CARE:  aggressive                      DISPOSITION:  ICU / [facility]

## 2018-07-01 NOTE — OCCUPATIONAL THERAPY INITIAL EVALUATION ADULT - ADDITIONAL COMMENTS
Per patient he was independent with ADL PTA ambulating with cane. Has chair lift and ramp (states his wife is disabled, he is primary caretaker).

## 2018-07-01 NOTE — PROGRESS NOTE ADULT - ASSESSMENT
72M with  1.  degenerative joint disease of the lumbar spine, spinal stenosis, s/p lumbar laminectomy (2017), s/p T10 to pelvis fusion with plastics closure (06/29/2018, Dr. Young, Dr. Arredondo, Dr. Rodriguez)  2.  HDN dyslipidemia  3.  peripheral arterial disease    PLAN:   NEURO: spine checks q1h, VS q1h, PRN pain meds with Tylenol, dilaudid  spinal stenosis s/p fusion:  steroid taper as per neurosurgery  drains:  keep for now  Delirium precautions:  d/c diazepam, provide cognitive activities TID, sleep protocol to normalize sleep-wake cycles; early mob and ROM exercises; remove catheters / restraints; eyeglass / hearing aids; maintain hydration  REHAB:  physical therapy evaluation and management    EARLY MOB:  HOB up    PULM:  extubated, PRN O2 support to keep sats >/=92%; incentive spirometry  CARDIO:  SBP goal 100-150mm Hg, on levophed x 1 hour post-op, but off since last night, BP stable; d/c losartan for now  ENDO:  Blood sugar goals 140-180 mg/dL, continue insulin sliding scale, high-dose statins, check CMP  GI:  PPI for GI prophylaxis while on steroids  DIET: NPO for extubation  RENAL:  IVL for now, watch UO   HEM/ONC: anemia, Hb stable  VTE Prophylaxis: SCDs, no DVT chemoprophylaxis for now as patient is high risk for bleed (late case, fresh post-op); baseline LE Doppler for DVT suspected on admission (spinal disease, decreased mobility)  ID: afebrile, no leukocytosis; periop ancef then d/c  Social: will update family    ATTENDING ATTESTATION:  I was physically present for the key portions of the evaluation and management (E/M) service provided.  I agree with the above history, physical and plan, which I have reviewed and edited where appropriate.    Patient at high risk for neurological deterioration or death due to:  ICU delirium, aspiration PNA, DVT / PE.  Critical care time, excluding procedures: 60 minutes spent on total encounter, more than 50% of the visit was spent counseling and/or coordinating care by the attending physician.     Plan discussed with RN, house staff. 72M with  1.  degenerative joint disease of the lumbar spine, spinal stenosis, s/p lumbar laminectomy (2017), s/p T10 to pelvis fusion with plastics closure (06/29/2018, Dr. Young, Dr. Arredondo, Dr. Rodriguez)  2.  HDN dyslipidemia  3.  peripheral arterial disease    PLAN:   NEURO: spine checks q4h, VS q1h, PRN pain meds with Tylenol, dilaudid  spinal stenosis s/p fusion:  steroid taper as per neurosurgery  drains:  as per plastics  Delirium precautions:  provide cognitive activities TID, sleep protocol to normalize sleep-wake cycles; early mob and ROM exercises; remove catheters / restraints; eyeglass / hearing aids; maintain hydration  REHAB:  physical therapy evaluation and management    EARLY MOB:  HOB up, OOB to chair ambulate     PULM: PRN O2 support to keep sats >/=92%; incentive spirometry   CARDIO:  SBP goal 100-150mm Hg, off losartan (home meds)  ENDO:  Blood sugar goals 140-180 mg/dL, continue insulin sliding scale, cont high dose statins  GI:  PPI for GI prophylaxis while on steroids  DIET: regular diet   RENAL:  IVL   HEM/ONC: anemia, Hb stable  VTE Prophylaxis: SCDs, SQL, f/u baseline LE Doppler   ID: afebrile, no leukocytosis  Social: will update family    ATTENDING ATTESTATION:  I was physically present for the key portions of the evaluation and management (E/M) service provided.  I agree with the above history, physical and plan which I have reviewed and edited where appropriate.     Patient not at high risk for neurologic deterioration / death.  Time spent on this noncritically ill patient: 45 minutes spent on total encounter, more than 50% of the visit was spent counseling and/or coordinating care by the attending physician.    Plan discussed with RN, house staff.

## 2018-07-01 NOTE — OCCUPATIONAL THERAPY INITIAL EVALUATION ADULT - GENERAL OBSERVATIONS, REHAB EVAL
Left hand dominant. Chart reviewed, patient cleared for OT eval by TAVO Moya. Received semi-supine, NAD, +tele, +heplock, +JPx2, +hemovac x2, denies pain at rest.

## 2018-07-01 NOTE — PROVIDER CONTACT NOTE (MEDICATION) - SITUATION
Pts f/s was 148. Pt ordered standing 10 units of Lantus. Contacted GRETTA Ritchie to question the standing order of insulin and if to hold or give it to pt.

## 2018-07-01 NOTE — OCCUPATIONAL THERAPY INITIAL EVALUATION ADULT - MD ORDER
Per chart, 73 y/o male pmhx HTN, HLD, spinal stenosis s/p lumbar laminectomy last year presents today for elective T10-pelvis fusion today. Patient reports chronic LBP and ambulates with cane assistance. He denies any falls but reports imbalance with ambulating. He denies any leg pain, weakness, numbness or tingling.

## 2018-07-01 NOTE — PROGRESS NOTE ADULT - SUBJECTIVE AND OBJECTIVE BOX
HPI:  73 y/o male pmhx HTN, HLD, spinal stenosis s/p lumbar laminectomy last year presents today for elective T10-pelvis fusin today. Patient reports chronic LBP and ambulates with cane assistance. He denies any falls but reports imbalance with ambulating. He denies any leg pain, weakness, numbness or tingling. (29 Jun 2018 07:19)    OVERNIGHT EVENTS: No issues overnight. Afebrile, neurologically stable.     Hospital course:   POD 0: transferred to ICU intubated, kept on sedation  POD 1: wean to extubate, no acute events overnight, maintained comfort on propofol and fent. lasix 10 IVP x 1 overnight  POD 2: No issues overnight. Afebrile, neuro stable. Hgb 7.2 this AM, plan for transfusion today before stepdown    Vital Signs Last 24 Hrs  T(C): 37.4 (01 Jul 2018 05:49), Max: 37.4 (01 Jul 2018 05:49)  T(F): 99.3 (01 Jul 2018 05:49), Max: 99.3 (01 Jul 2018 05:49)  HR: 102 (01 Jul 2018 07:00) (96 - 120)  BP: 140/59 (01 Jul 2018 07:00) (97/50 - 170/57)  BP(mean): 79 (01 Jul 2018 07:00) (60 - 91)  RR: 22 (01 Jul 2018 07:00) (12 - 31)  SpO2: 91% (01 Jul 2018 07:00) (89% - 100%)    I&O's Detail    30 Jun 2018 07:01  -  01 Jul 2018 07:00  --------------------------------------------------------  IN:    IV PiggyBack: 150 mL    Oral Fluid: 800 mL  Total IN: 950 mL    OUT:    Bulb: 40 mL    Bulb: 160 mL    Evacuated Tube System: 205 mL    Evacuated Tube System: 210 mL    Indwelling Catheter - Urethral: 295 mL    Voided: 850 mL  Total OUT: 1760 mL    Total NET: -810 mL        I&O's Summary    30 Jun 2018 07:01  -  01 Jul 2018 07:00  --------------------------------------------------------  IN: 950 mL / OUT: 1760 mL / NET: -810 mL        PHYSICAL EXAM:  Gen:   Neurological:  CN II-XII:   Motor exam:  Cardiovascular:  Respiratory:  Gastrointestinal:  Incision/Wound:    TUBES/LINES:  [] CVC  [] A-line  [] Lumbar Drain  [] Ventriculostomy  [] Other    DIET:  [] NPO  [x] Mechanical  [] Tube feeds    LABS:                        7.2    12.5  )-----------( 152      ( 01 Jul 2018 05:40 )             22.5     07-01    140  |  104  |  29<H>  ----------------------------<  160<H>  4.4   |  26  |  1.20    Ca    8.0<L>      01 Jul 2018 05:40  Phos  2.0     07-01  Mg     2.4     07-01    TPro  5.3<L>  /  Alb  3.9  /  TBili  0.3  /  DBili  <0.2  /  AST  49<H>  /  ALT  38  /  AlkPhos  28<L>  06-30            CAPILLARY BLOOD GLUCOSE      POCT Blood Glucose.: 157 mg/dL (01 Jul 2018 05:53)  POCT Blood Glucose.: 194 mg/dL (30 Jun 2018 21:28)  POCT Blood Glucose.: 167 mg/dL (30 Jun 2018 15:47)  POCT Blood Glucose.: 192 mg/dL (30 Jun 2018 11:51)      Drug Levels: [] N/A    CSF Analysis: [] N/A      Allergies    No Known Allergies    Intolerances      MEDICATIONS:  Antibiotics:    Neuro:  acetaminophen   Tablet 650 milliGRAM(s) Oral every 6 hours PRN  acetaminophen   Tablet. 650 milliGRAM(s) Oral every 6 hours PRN  HYDROmorphone  Injectable 1 milliGRAM(s) IV Push every 3 hours PRN  ondansetron Injectable 4 milliGRAM(s) IV Push every 6 hours PRN  oxyCODONE    5 mG/acetaminophen 325 mG 1 Tablet(s) Oral every 4 hours PRN  oxyCODONE    5 mG/acetaminophen 325 mG 2 Tablet(s) Oral every 6 hours PRN    Anticoagulation:  enoxaparin Injectable 40 milliGRAM(s) SubCutaneous at bedtime    OTHER:  atorvastatin 80 milliGRAM(s) Oral at bedtime  BUpivacaine liposome 1.3% Injectable (no eMAR) 20 milliLiter(s) Local Injection once  dexamethasone     Tablet 4 milliGRAM(s) Oral every 6 hours  dextrose 40% Gel 15 Gram(s) Oral once PRN  dextrose 50% Injectable 12.5 Gram(s) IV Push once  dextrose 50% Injectable 25 Gram(s) IV Push once  dextrose 50% Injectable 25 Gram(s) IV Push once  docusate sodium 100 milliGRAM(s) Oral three times a day  glucagon  Injectable 1 milliGRAM(s) IntraMuscular once PRN  insulin lispro (HumaLOG) corrective regimen sliding scale   SubCutaneous Before meals and at bedtime  pantoprazole    Tablet 40 milliGRAM(s) Oral before breakfast  senna 2 Tablet(s) Oral at bedtime    IVF:  dextrose 5%. 1000 milliLiter(s) IV Continuous <Continuous>  sodium phosphate IVPB 30 milliMole(s) IV Intermittent once    CULTURES:    RADIOLOGY & ADDITIONAL TESTS:      ASSESSMENT:  72y Male s/p T10- pelvis instrumentation with fusion, POD 2 neuro intact    PLAN:  NEURO:  - Neuro checks  - vitals checks  - pain control  - CT pelvis today  - CT lumbar spine today   - HMV x2, JPx2 in place, care as per plastics   - Needs TLSO brace, can be OOB without it until brace arrives     CARDIOVASCULAR:    PULMONARY:    RENAL:    GI:    HEME:    ID:    ENDO:    DVT PROPHYLAXIS: SCD's    DISPOSITION: HPI:  71 y/o male pmhx HTN, HLD, spinal stenosis s/p lumbar laminectomy last year presents today for elective T10-pelvis fusin today. Patient reports chronic LBP and ambulates with cane assistance. He denies any falls but reports imbalance with ambulating. He denies any leg pain, weakness, numbness or tingling. (29 Jun 2018 07:19)    OVERNIGHT EVENTS: No issues overnight. Afebrile, neurologically stable.     Hospital course:   POD 0: transferred to ICU intubated, kept on sedation  POD 1: wean to extubate, no acute events overnight, maintained comfort on propofol and fent. lasix 10 IVP x 1 overnight. orthostatic with PT.   POD 2: No issues overnight. Afebrile, neuro stable. Hgb 7.2 this AM, plan for transfusion today before stepdown    Vital Signs Last 24 Hrs  T(C): 37.4 (01 Jul 2018 05:49), Max: 37.4 (01 Jul 2018 05:49)  T(F): 99.3 (01 Jul 2018 05:49), Max: 99.3 (01 Jul 2018 05:49)  HR: 102 (01 Jul 2018 07:00) (96 - 120)  BP: 140/59 (01 Jul 2018 07:00) (97/50 - 170/57)  BP(mean): 79 (01 Jul 2018 07:00) (60 - 91)  RR: 22 (01 Jul 2018 07:00) (12 - 31)  SpO2: 91% (01 Jul 2018 07:00) (89% - 100%)    I&O's Detail    30 Jun 2018 07:01  -  01 Jul 2018 07:00  --------------------------------------------------------  IN:    IV PiggyBack: 150 mL    Oral Fluid: 800 mL  Total IN: 950 mL    OUT:    Bulb: 40 mL    Bulb: 160 mL    Evacuated Tube System: 205 mL    Evacuated Tube System: 210 mL    Indwelling Catheter - Urethral: 295 mL    Voided: 850 mL  Total OUT: 1760 mL    Total NET: -810 mL        I&O's Summary    30 Jun 2018 07:01  -  01 Jul 2018 07:00  --------------------------------------------------------  IN: 950 mL / OUT: 1760 mL / NET: -810 mL        PHYSICAL EXAM:  Gen: laying in hospital bed comfortably, NAD  Neurological: AA+Ox3, opens eyes spontaneously, FC  CN II-XII: grossly intact, PERRL, EOMI  Motor exam: MAEx4, RLE 4-/5, otherwise 5/5 strength throughout  Cardiovascular: regular rate and rhythm  Respiratory: clear to auscultation  Gastrointestinal: soft, nontender, nondistended  Incision/Wound: C/D/I    TUBES/LINES:  [] CVC  [] A-line  [] Lumbar Drain  [] Ventriculostomy  [] Other    DIET:  [] NPO  [x] Mechanical  [] Tube feeds    LABS:                        7.2    12.5  )-----------( 152      ( 01 Jul 2018 05:40 )             22.5     07-01    140  |  104  |  29<H>  ----------------------------<  160<H>  4.4   |  26  |  1.20    Ca    8.0<L>      01 Jul 2018 05:40  Phos  2.0     07-01  Mg     2.4     07-01    TPro  5.3<L>  /  Alb  3.9  /  TBili  0.3  /  DBili  <0.2  /  AST  49<H>  /  ALT  38  /  AlkPhos  28<L>  06-30            CAPILLARY BLOOD GLUCOSE      POCT Blood Glucose.: 157 mg/dL (01 Jul 2018 05:53)  POCT Blood Glucose.: 194 mg/dL (30 Jun 2018 21:28)  POCT Blood Glucose.: 167 mg/dL (30 Jun 2018 15:47)  POCT Blood Glucose.: 192 mg/dL (30 Jun 2018 11:51)      Drug Levels: [] N/A    CSF Analysis: [] N/A      Allergies    No Known Allergies    Intolerances      MEDICATIONS:  Antibiotics:    Neuro:  acetaminophen   Tablet 650 milliGRAM(s) Oral every 6 hours PRN  acetaminophen   Tablet. 650 milliGRAM(s) Oral every 6 hours PRN  HYDROmorphone  Injectable 1 milliGRAM(s) IV Push every 3 hours PRN  ondansetron Injectable 4 milliGRAM(s) IV Push every 6 hours PRN  oxyCODONE    5 mG/acetaminophen 325 mG 1 Tablet(s) Oral every 4 hours PRN  oxyCODONE    5 mG/acetaminophen 325 mG 2 Tablet(s) Oral every 6 hours PRN    Anticoagulation:  enoxaparin Injectable 40 milliGRAM(s) SubCutaneous at bedtime    OTHER:  atorvastatin 80 milliGRAM(s) Oral at bedtime  BUpivacaine liposome 1.3% Injectable (no eMAR) 20 milliLiter(s) Local Injection once  dexamethasone     Tablet 4 milliGRAM(s) Oral every 6 hours  dextrose 40% Gel 15 Gram(s) Oral once PRN  dextrose 50% Injectable 12.5 Gram(s) IV Push once  dextrose 50% Injectable 25 Gram(s) IV Push once  dextrose 50% Injectable 25 Gram(s) IV Push once  docusate sodium 100 milliGRAM(s) Oral three times a day  glucagon  Injectable 1 milliGRAM(s) IntraMuscular once PRN  insulin lispro (HumaLOG) corrective regimen sliding scale   SubCutaneous Before meals and at bedtime  pantoprazole    Tablet 40 milliGRAM(s) Oral before breakfast  senna 2 Tablet(s) Oral at bedtime    IVF:  dextrose 5%. 1000 milliLiter(s) IV Continuous <Continuous>  sodium phosphate IVPB 30 milliMole(s) IV Intermittent once    CULTURES:    RADIOLOGY & ADDITIONAL TESTS:      ASSESSMENT:  72y Male s/p T10- pelvis instrumentation with fusion, POD 2 neuro intact    PLAN:  NEURO:  - Neuro checks  - vitals checks  - pain control  - CT pelvis today  - CT lumbar spine today   - HMV x2, JPx2 in place, care as per plastics   - Needs TLSO brace, can be OOB without it until brace arrives     CARDIOVASCULAR:    PULMONARY:    RENAL:    GI:    HEME:    ID:    ENDO:    DVT PROPHYLAXIS: SCD's    DISPOSITION: HPI:  73 y/o male pmhx HTN, HLD, spinal stenosis s/p lumbar laminectomy last year presents today for elective T10-pelvis fusin today. Patient reports chronic LBP and ambulates with cane assistance. He denies any falls but reports imbalance with ambulating. He denies any leg pain, weakness, numbness or tingling. (29 Jun 2018 07:19)    OVERNIGHT EVENTS: No issues overnight. Afebrile, neurologically stable.     Hospital course:   POD 0: transferred to ICU intubated, kept on sedation  POD 1: wean to extubate, no acute events overnight, maintained comfort on propofol and fent. lasix 10 IVP x 1 overnight. orthostatic with PT.   POD 2: No issues overnight. Afebrile, neuro stable. Hgb 7.2 this AM, plan for transfusion today before stepdown    Vital Signs Last 24 Hrs  T(C): 37.4 (01 Jul 2018 05:49), Max: 37.4 (01 Jul 2018 05:49)  T(F): 99.3 (01 Jul 2018 05:49), Max: 99.3 (01 Jul 2018 05:49)  HR: 102 (01 Jul 2018 07:00) (96 - 120)  BP: 140/59 (01 Jul 2018 07:00) (97/50 - 170/57)  BP(mean): 79 (01 Jul 2018 07:00) (60 - 91)  RR: 22 (01 Jul 2018 07:00) (12 - 31)  SpO2: 91% (01 Jul 2018 07:00) (89% - 100%)    I&O's Detail    30 Jun 2018 07:01  -  01 Jul 2018 07:00  --------------------------------------------------------  IN:    IV PiggyBack: 150 mL    Oral Fluid: 800 mL  Total IN: 950 mL    OUT:    Bulb: 40 mL    Bulb: 160 mL    Evacuated Tube System: 205 mL    Evacuated Tube System: 210 mL    Indwelling Catheter - Urethral: 295 mL    Voided: 850 mL  Total OUT: 1760 mL    Total NET: -810 mL        I&O's Summary    30 Jun 2018 07:01  -  01 Jul 2018 07:00  --------------------------------------------------------  IN: 950 mL / OUT: 1760 mL / NET: -810 mL        PHYSICAL EXAM:  Gen: laying in hospital bed comfortably, NAD  Neurological: AA+Ox3, opens eyes spontaneously, FC  CN II-XII: grossly intact, PERRL, EOMI  Motor exam: MAEx4, RLE 4-/5, otherwise 5/5 strength throughout  Cardiovascular: regular rate and rhythm  Respiratory: clear to auscultation  Gastrointestinal: soft, nontender, nondistended  Incision/Wound: C/D/I    TUBES/LINES:  [] CVC  [] A-line  [] Lumbar Drain  [] Ventriculostomy  [] Other    DIET:  [] NPO  [x] Mechanical  [] Tube feeds    LABS:                        7.2    12.5  )-----------( 152      ( 01 Jul 2018 05:40 )             22.5     07-01    140  |  104  |  29<H>  ----------------------------<  160<H>  4.4   |  26  |  1.20    Ca    8.0<L>      01 Jul 2018 05:40  Phos  2.0     07-01  Mg     2.4     07-01    TPro  5.3<L>  /  Alb  3.9  /  TBili  0.3  /  DBili  <0.2  /  AST  49<H>  /  ALT  38  /  AlkPhos  28<L>  06-30            CAPILLARY BLOOD GLUCOSE      POCT Blood Glucose.: 157 mg/dL (01 Jul 2018 05:53)  POCT Blood Glucose.: 194 mg/dL (30 Jun 2018 21:28)  POCT Blood Glucose.: 167 mg/dL (30 Jun 2018 15:47)  POCT Blood Glucose.: 192 mg/dL (30 Jun 2018 11:51)      Drug Levels: [] N/A    CSF Analysis: [] N/A      Allergies    No Known Allergies    Intolerances      MEDICATIONS:  Antibiotics:    Neuro:  acetaminophen   Tablet 650 milliGRAM(s) Oral every 6 hours PRN  acetaminophen   Tablet. 650 milliGRAM(s) Oral every 6 hours PRN  HYDROmorphone  Injectable 1 milliGRAM(s) IV Push every 3 hours PRN  ondansetron Injectable 4 milliGRAM(s) IV Push every 6 hours PRN  oxyCODONE    5 mG/acetaminophen 325 mG 1 Tablet(s) Oral every 4 hours PRN  oxyCODONE    5 mG/acetaminophen 325 mG 2 Tablet(s) Oral every 6 hours PRN    Anticoagulation:  enoxaparin Injectable 40 milliGRAM(s) SubCutaneous at bedtime    OTHER:  atorvastatin 80 milliGRAM(s) Oral at bedtime  BUpivacaine liposome 1.3% Injectable (no eMAR) 20 milliLiter(s) Local Injection once  dexamethasone     Tablet 4 milliGRAM(s) Oral every 6 hours  dextrose 40% Gel 15 Gram(s) Oral once PRN  dextrose 50% Injectable 12.5 Gram(s) IV Push once  dextrose 50% Injectable 25 Gram(s) IV Push once  dextrose 50% Injectable 25 Gram(s) IV Push once  docusate sodium 100 milliGRAM(s) Oral three times a day  glucagon  Injectable 1 milliGRAM(s) IntraMuscular once PRN  insulin lispro (HumaLOG) corrective regimen sliding scale   SubCutaneous Before meals and at bedtime  pantoprazole    Tablet 40 milliGRAM(s) Oral before breakfast  senna 2 Tablet(s) Oral at bedtime    IVF:  dextrose 5%. 1000 milliLiter(s) IV Continuous <Continuous>  sodium phosphate IVPB 30 milliMole(s) IV Intermittent once    CULTURES:    RADIOLOGY & ADDITIONAL TESTS:      ASSESSMENT:  72y Male s/p T10- pelvis instrumentation with fusion, POD 2 neuro intact    PLAN:  NEURO:  - Neuro checks  - vitals checks  - pain control  - CT pelvis today  - CT lumbar spine today   - HMV x2, JPx2 in place, care as per plastics   - Needs TLSO brace, can be OOB without it until brace arrives     CARDIOVASCULAR:  - normotensive SBP goal     PULMONARY:  - On room air, no issues    RENAL:  - No issues    GI:  - regular diet  - bowel regimen: colace, senna    HEME:  - Hgb 7.2 this AM  - 1 unit PRBC  - follow up CBC    ID:    ENDO:    DVT PROPHYLAXIS: SCD's    DISPOSITION: HPI:  71 y/o male pmhx HTN, HLD, spinal stenosis s/p lumbar laminectomy last year presents today for elective T10-pelvis fusin today. Patient reports chronic LBP and ambulates with cane assistance. He denies any falls but reports imbalance with ambulating. He denies any leg pain, weakness, numbness or tingling. (29 Jun 2018 07:19)    OVERNIGHT EVENTS: No issues overnight. Afebrile, neurologically stable.     Hospital course:   POD 0: transferred to ICU intubated, kept on sedation  POD 1: wean to extubate, no acute events overnight, maintained comfort on propofol and fent. lasix 10 IVP x 1 overnight. orthostatic with PT.   POD 2: No issues overnight. Afebrile, neuro stable. Hgb 7.2 this AM, plan for transfusion today before stepdown    Vital Signs Last 24 Hrs  T(C): 37.4 (01 Jul 2018 05:49), Max: 37.4 (01 Jul 2018 05:49)  T(F): 99.3 (01 Jul 2018 05:49), Max: 99.3 (01 Jul 2018 05:49)  HR: 102 (01 Jul 2018 07:00) (96 - 120)  BP: 140/59 (01 Jul 2018 07:00) (97/50 - 170/57)  BP(mean): 79 (01 Jul 2018 07:00) (60 - 91)  RR: 22 (01 Jul 2018 07:00) (12 - 31)  SpO2: 91% (01 Jul 2018 07:00) (89% - 100%)    I&O's Detail    30 Jun 2018 07:01  -  01 Jul 2018 07:00  --------------------------------------------------------  IN:    IV PiggyBack: 150 mL    Oral Fluid: 800 mL  Total IN: 950 mL    OUT:    Bulb: 40 mL    Bulb: 160 mL    Evacuated Tube System: 205 mL    Evacuated Tube System: 210 mL    Indwelling Catheter - Urethral: 295 mL    Voided: 850 mL  Total OUT: 1760 mL    Total NET: -810 mL        I&O's Summary    30 Jun 2018 07:01  -  01 Jul 2018 07:00  --------------------------------------------------------  IN: 950 mL / OUT: 1760 mL / NET: -810 mL        PHYSICAL EXAM:  Gen: laying in hospital bed comfortably, NAD  Neurological: AA+Ox3, opens eyes spontaneously, FC  CN II-XII: grossly intact, PERRL, EOMI  Motor exam: MAEx4, RLE 4-/5, otherwise 5/5 strength throughout  Cardiovascular: regular rate and rhythm  Respiratory: clear to auscultation  Gastrointestinal: soft, nontender, nondistended  Incision/Wound: C/D/I    TUBES/LINES:  [] CVC  [] A-line  [] Lumbar Drain  [] Ventriculostomy  [] Other    DIET:  [] NPO  [x] Mechanical  [] Tube feeds    LABS:                        7.2    12.5  )-----------( 152      ( 01 Jul 2018 05:40 )             22.5     07-01    140  |  104  |  29<H>  ----------------------------<  160<H>  4.4   |  26  |  1.20    Ca    8.0<L>      01 Jul 2018 05:40  Phos  2.0     07-01  Mg     2.4     07-01    TPro  5.3<L>  /  Alb  3.9  /  TBili  0.3  /  DBili  <0.2  /  AST  49<H>  /  ALT  38  /  AlkPhos  28<L>  06-30            CAPILLARY BLOOD GLUCOSE      POCT Blood Glucose.: 157 mg/dL (01 Jul 2018 05:53)  POCT Blood Glucose.: 194 mg/dL (30 Jun 2018 21:28)  POCT Blood Glucose.: 167 mg/dL (30 Jun 2018 15:47)  POCT Blood Glucose.: 192 mg/dL (30 Jun 2018 11:51)      Drug Levels: [] N/A    CSF Analysis: [] N/A      Allergies    No Known Allergies    Intolerances      MEDICATIONS:  Antibiotics:    Neuro:  acetaminophen   Tablet 650 milliGRAM(s) Oral every 6 hours PRN  acetaminophen   Tablet. 650 milliGRAM(s) Oral every 6 hours PRN  HYDROmorphone  Injectable 1 milliGRAM(s) IV Push every 3 hours PRN  ondansetron Injectable 4 milliGRAM(s) IV Push every 6 hours PRN  oxyCODONE    5 mG/acetaminophen 325 mG 1 Tablet(s) Oral every 4 hours PRN  oxyCODONE    5 mG/acetaminophen 325 mG 2 Tablet(s) Oral every 6 hours PRN    Anticoagulation:  enoxaparin Injectable 40 milliGRAM(s) SubCutaneous at bedtime    OTHER:  atorvastatin 80 milliGRAM(s) Oral at bedtime  BUpivacaine liposome 1.3% Injectable (no eMAR) 20 milliLiter(s) Local Injection once  dexamethasone     Tablet 4 milliGRAM(s) Oral every 6 hours  dextrose 40% Gel 15 Gram(s) Oral once PRN  dextrose 50% Injectable 12.5 Gram(s) IV Push once  dextrose 50% Injectable 25 Gram(s) IV Push once  dextrose 50% Injectable 25 Gram(s) IV Push once  docusate sodium 100 milliGRAM(s) Oral three times a day  glucagon  Injectable 1 milliGRAM(s) IntraMuscular once PRN  insulin lispro (HumaLOG) corrective regimen sliding scale   SubCutaneous Before meals and at bedtime  pantoprazole    Tablet 40 milliGRAM(s) Oral before breakfast  senna 2 Tablet(s) Oral at bedtime    IVF:  dextrose 5%. 1000 milliLiter(s) IV Continuous <Continuous>  sodium phosphate IVPB 30 milliMole(s) IV Intermittent once    CULTURES:    RADIOLOGY & ADDITIONAL TESTS:      ASSESSMENT:  72y Male s/p T10- pelvis instrumentation with fusion, POD 2 neuro intact    PLAN:  NEURO:  - Neuro checks  - vitals checks  - pain control  - CT pelvis today  - CT lumbar spine today   - HMV x2, JPx2 in place, care as per plastics   - Needs TLSO brace, can be OOB without it until brace arrives   - Decadron taper    CARDIOVASCULAR:  - normotensive SBP goal     PULMONARY:  - On room air, no issues    RENAL:  - No issues    GI:  - regular diet  - bowel regimen: colace, senna    HEME:  - Hgb 7.2 this AM  - 1 unit PRBC  - follow up CBC    ID:  - Monitor WBC    ENDO:  - ISS    DVT PROPHYLAXIS: SCD's    DISPOSITION: stepdown today, Home PT vs [facility] when ready for discharge    d/w Dr. Young,  Sy

## 2018-07-02 DIAGNOSIS — I47.2 VENTRICULAR TACHYCARDIA: ICD-10-CM

## 2018-07-02 DIAGNOSIS — I73.9 PERIPHERAL VASCULAR DISEASE, UNSPECIFIED: ICD-10-CM

## 2018-07-02 DIAGNOSIS — E78.00 PURE HYPERCHOLESTEROLEMIA, UNSPECIFIED: ICD-10-CM

## 2018-07-02 DIAGNOSIS — I10 ESSENTIAL (PRIMARY) HYPERTENSION: ICD-10-CM

## 2018-07-02 DIAGNOSIS — E10.65 TYPE 1 DIABETES MELLITUS WITH HYPERGLYCEMIA: ICD-10-CM

## 2018-07-02 LAB
ANION GAP SERPL CALC-SCNC: 10 MMOL/L — SIGNIFICANT CHANGE UP (ref 5–17)
BASOPHILS NFR BLD AUTO: 0 % — SIGNIFICANT CHANGE UP (ref 0–2)
BUN SERPL-MCNC: 23 MG/DL — SIGNIFICANT CHANGE UP (ref 7–23)
CALCIUM SERPL-MCNC: 8.5 MG/DL — SIGNIFICANT CHANGE UP (ref 8.4–10.5)
CHLORIDE SERPL-SCNC: 104 MMOL/L — SIGNIFICANT CHANGE UP (ref 96–108)
CO2 SERPL-SCNC: 26 MMOL/L — SIGNIFICANT CHANGE UP (ref 22–31)
CREAT SERPL-MCNC: 0.9 MG/DL — SIGNIFICANT CHANGE UP (ref 0.5–1.3)
EOSINOPHIL NFR BLD AUTO: 0 % — SIGNIFICANT CHANGE UP (ref 0–6)
GLUCOSE BLDC GLUCOMTR-MCNC: 147 MG/DL — HIGH (ref 70–99)
GLUCOSE BLDC GLUCOMTR-MCNC: 150 MG/DL — HIGH (ref 70–99)
GLUCOSE BLDC GLUCOMTR-MCNC: 170 MG/DL — HIGH (ref 70–99)
GLUCOSE BLDC GLUCOMTR-MCNC: 177 MG/DL — HIGH (ref 70–99)
GLUCOSE BLDC GLUCOMTR-MCNC: 258 MG/DL — HIGH (ref 70–99)
GLUCOSE SERPL-MCNC: 150 MG/DL — HIGH (ref 70–99)
HCT VFR BLD CALC: 24.2 % — LOW (ref 39–50)
HGB BLD-MCNC: 7.9 G/DL — LOW (ref 13–17)
LYMPHOCYTES # BLD AUTO: 8.3 % — LOW (ref 13–44)
MAGNESIUM SERPL-MCNC: 2.2 MG/DL — SIGNIFICANT CHANGE UP (ref 1.6–2.6)
MCHC RBC-ENTMCNC: 30.6 PG — SIGNIFICANT CHANGE UP (ref 27–34)
MCHC RBC-ENTMCNC: 32.6 G/DL — SIGNIFICANT CHANGE UP (ref 32–36)
MCV RBC AUTO: 93.8 FL — SIGNIFICANT CHANGE UP (ref 80–100)
MONOCYTES NFR BLD AUTO: 9.1 % — SIGNIFICANT CHANGE UP (ref 2–14)
NEUTROPHILS NFR BLD AUTO: 82.6 % — HIGH (ref 43–77)
PHOSPHATE SERPL-MCNC: 2 MG/DL — LOW (ref 2.5–4.5)
PLATELET # BLD AUTO: 153 K/UL — SIGNIFICANT CHANGE UP (ref 150–400)
POTASSIUM SERPL-MCNC: 4.5 MMOL/L — SIGNIFICANT CHANGE UP (ref 3.5–5.3)
POTASSIUM SERPL-SCNC: 4.5 MMOL/L — SIGNIFICANT CHANGE UP (ref 3.5–5.3)
RBC # BLD: 2.58 M/UL — LOW (ref 4.2–5.8)
RBC # FLD: 15.3 % — SIGNIFICANT CHANGE UP (ref 10.3–16.9)
SODIUM SERPL-SCNC: 140 MMOL/L — SIGNIFICANT CHANGE UP (ref 135–145)
WBC # BLD: 11.7 K/UL — HIGH (ref 3.8–10.5)
WBC # FLD AUTO: 11.7 K/UL — HIGH (ref 3.8–10.5)

## 2018-07-02 PROCEDURE — 72192 CT PELVIS W/O DYE: CPT | Mod: 26

## 2018-07-02 PROCEDURE — 93970 EXTREMITY STUDY: CPT | Mod: 26

## 2018-07-02 PROCEDURE — 99222 1ST HOSP IP/OBS MODERATE 55: CPT

## 2018-07-02 PROCEDURE — 72131 CT LUMBAR SPINE W/O DYE: CPT | Mod: 26

## 2018-07-02 PROCEDURE — 99233 SBSQ HOSP IP/OBS HIGH 50: CPT | Mod: GC

## 2018-07-02 RX ORDER — SODIUM,POTASSIUM PHOSPHATES 278-250MG
1 POWDER IN PACKET (EA) ORAL ONCE
Qty: 0 | Refills: 0 | Status: COMPLETED | OUTPATIENT
Start: 2018-07-02 | End: 2018-07-02

## 2018-07-02 RX ORDER — METOPROLOL TARTRATE 50 MG
12.5 TABLET ORAL EVERY 12 HOURS
Qty: 0 | Refills: 0 | Status: DISCONTINUED | OUTPATIENT
Start: 2018-07-02 | End: 2018-07-03

## 2018-07-02 RX ADMIN — Medication 100 MILLIGRAM(S): at 06:18

## 2018-07-02 RX ADMIN — Medication 2: at 17:36

## 2018-07-02 RX ADMIN — SENNA PLUS 2 TABLET(S): 8.6 TABLET ORAL at 21:57

## 2018-07-02 RX ADMIN — ATORVASTATIN CALCIUM 80 MILLIGRAM(S): 80 TABLET, FILM COATED ORAL at 21:57

## 2018-07-02 RX ADMIN — OXYCODONE AND ACETAMINOPHEN 2 TABLET(S): 5; 325 TABLET ORAL at 15:38

## 2018-07-02 RX ADMIN — OXYCODONE AND ACETAMINOPHEN 2 TABLET(S): 5; 325 TABLET ORAL at 14:38

## 2018-07-02 RX ADMIN — Medication 1 MILLIGRAM(S): at 17:37

## 2018-07-02 RX ADMIN — INSULIN GLARGINE 10 UNIT(S): 100 INJECTION, SOLUTION SUBCUTANEOUS at 22:13

## 2018-07-02 RX ADMIN — OXYCODONE AND ACETAMINOPHEN 2 TABLET(S): 5; 325 TABLET ORAL at 08:49

## 2018-07-02 RX ADMIN — PANTOPRAZOLE SODIUM 40 MILLIGRAM(S): 20 TABLET, DELAYED RELEASE ORAL at 21:57

## 2018-07-02 RX ADMIN — Medication 6: at 11:58

## 2018-07-02 RX ADMIN — Medication 2 MILLIGRAM(S): at 11:58

## 2018-07-02 RX ADMIN — Medication 12.5 MILLIGRAM(S): at 22:13

## 2018-07-02 RX ADMIN — Medication 2 MILLIGRAM(S): at 06:18

## 2018-07-02 RX ADMIN — Medication 1 PACKET(S): at 19:38

## 2018-07-02 RX ADMIN — OXYCODONE AND ACETAMINOPHEN 2 TABLET(S): 5; 325 TABLET ORAL at 09:45

## 2018-07-02 RX ADMIN — Medication 100 MILLIGRAM(S): at 14:38

## 2018-07-02 RX ADMIN — ENOXAPARIN SODIUM 40 MILLIGRAM(S): 100 INJECTION SUBCUTANEOUS at 22:12

## 2018-07-02 RX ADMIN — Medication 100 MILLIGRAM(S): at 21:57

## 2018-07-02 NOTE — PROGRESS NOTE ADULT - SUBJECTIVE AND OBJECTIVE BOX
Interval Events: Reviewed  Patient seen and examined at bedside.    Patient is a 72y old  Male who presents with a chief complaint of elective spine surgery (29 Jun 2018 07:19)  Is doing well and feeling better. Had a bowel movement.    PAST MEDICAL & SURGICAL HISTORY:  PAD (peripheral artery disease)  Hypercholesterolemia  Hypertension  H/O laminectomy  History of hernia repair  S/P hip replacement: right hip  S/P knee replacement, bilateral      MEDICATIONS:  Pulmonary:    Antimicrobials:    Anticoagulants:  enoxaparin Injectable 40 milliGRAM(s) SubCutaneous at bedtime    Cardiac:  metoprolol tartrate 12.5 milliGRAM(s) Oral every 12 hours      Allergies    No Known Allergies    Intolerances        Vital Signs Last 24 Hrs  T(C): 37.4 (02 Jul 2018 21:53), Max: 37.4 (02 Jul 2018 21:53)  T(F): 99.3 (02 Jul 2018 21:53), Max: 99.3 (02 Jul 2018 21:53)  HR: 84 (02 Jul 2018 21:53) (84 - 100)  BP: 156/79 (02 Jul 2018 21:53) (126/68 - 156/79)  BP(mean): --  RR: 20 (02 Jul 2018 21:53) (16 - 20)  SpO2: 97% (02 Jul 2018 21:53) (93% - 98%)    07-01 @ 07:01  -  07-02 @ 07:00  --------------------------------------------------------  IN: 1225 mL / OUT: 2965 mL / NET: -1740 mL    07-02 @ 07:01  -  07-02 @ 22:48  --------------------------------------------------------  IN: 1370 mL / OUT: 615 mL / NET: 755 mL          LABS:      CBC Full  -  ( 02 Jul 2018 07:18 )  WBC Count : 11.7 K/uL  Hemoglobin : 7.9 g/dL  Hematocrit : 24.2 %  Platelet Count - Automated : 153 K/uL  Mean Cell Volume : 93.8 fL  Mean Cell Hemoglobin : 30.6 pg  Mean Cell Hemoglobin Concentration : 32.6 g/dL  Auto Neutrophil # : x  Auto Lymphocyte # : x  Auto Monocyte # : x  Auto Eosinophil # : x  Auto Basophil # : x  Auto Neutrophil % : 82.6 %  Auto Lymphocyte % : 8.3 %  Auto Monocyte % : 9.1 %  Auto Eosinophil % : 0.0 %  Auto Basophil % : 0.0 %    07-02    140  |  104  |  23  ----------------------------<  150<H>  4.5   |  26  |  0.90    Ca    8.5      02 Jul 2018 07:18  Phos  2.0     07-02  Mg     2.2     07-02                          RADIOLOGY & ADDITIONAL STUDIES (The following images were personally reviewed):  Ivory:                                     No  Urine output:                       adequate  DVT prophylaxis:                 Yes  Flattus:                                  Yes  Bowel movement:       yes

## 2018-07-02 NOTE — PROGRESS NOTE ADULT - SUBJECTIVE AND OBJECTIVE BOX
HPI:  71 y/o male pmhx HTN, HLD, spinal stenosis s/p lumbar laminectomy last year presents today for elective T10-pelvis fusin today. Patient reports chronic LBP and ambulates with cane assistance. He denies any falls but reports imbalance with ambulating. He denies any leg pain, weakness, numbness or tingling. (29 Jun 2018 07:19)    OVERNIGHT EVENTS:  Vital Signs Last 24 Hrs  T(C): 36.9 (02 Jul 2018 08:50), Max: 37.1 (01 Jul 2018 14:39)  T(F): 98.5 (02 Jul 2018 08:50), Max: 98.8 (01 Jul 2018 14:39)  HR: 100 (02 Jul 2018 08:50) (90 - 112)  BP: 151/70 (02 Jul 2018 08:50) (112/59 - 155/72)  BP(mean): 85 (01 Jul 2018 15:00) (73 - 105)  RR: 16 (02 Jul 2018 08:50) (15 - 29)  SpO2: 97% (02 Jul 2018 08:50) (85% - 97%)    I&O's Summary    01 Jul 2018 07:01  -  02 Jul 2018 07:00  --------------------------------------------------------  IN: 1225 mL / OUT: 2965 mL / NET: -1740 mL    02 Jul 2018 07:01  -  02 Jul 2018 09:07  --------------------------------------------------------  IN: 0 mL / OUT: 400 mL / NET: -400 mL    PHYSICAL EXAM:  Neurological: AA+Ox3, opens eyes spontaneously, FC  CN II-XII: grossly intact, PERRL, EOMI  Motor exam: MAEx4, RLE 4-/5, otherwise 5/5 strength throughout  Cardiovascular: regular rate and rhythm  Respiratory: clear to auscultation  Gastrointestinal: soft, nontender, nondistended  Incision/Wound: C/D/I with KARON and hemovac as per Plastic Surgery managment      DIET: Regular    LABS:                        7.9    11.7  )-----------( 153      ( 02 Jul 2018 07:18 )             24.2     07-02    140  |  104  |  23  ----------------------------<  150<H>  4.5   |  26  |  0.90    Ca    8.5      02 Jul 2018 07:18  Phos  2.0     07-02  Mg     2.2     07-02      CAPILLARY BLOOD GLUCOSE      POCT Blood Glucose.: 147 mg/dL (02 Jul 2018 07:04)  POCT Blood Glucose.: 177 mg/dL (02 Jul 2018 04:01)  POCT Blood Glucose.: 148 mg/dL (01 Jul 2018 22:16)  POCT Blood Glucose.: 176 mg/dL (01 Jul 2018 18:15)  POCT Blood Glucose.: 200 mg/dL (01 Jul 2018 15:22)  POCT Blood Glucose.: 207 mg/dL (01 Jul 2018 11:04)    Allergies    No Known Allergies    Intolerances      MEDICATIONS:  Antibiotics:    Neuro:  acetaminophen   Tablet 650 milliGRAM(s) Oral every 6 hours PRN  acetaminophen   Tablet. 650 milliGRAM(s) Oral every 6 hours PRN  HYDROmorphone  Injectable 1 milliGRAM(s) IV Push every 3 hours PRN  ondansetron Injectable 4 milliGRAM(s) IV Push every 6 hours PRN  oxyCODONE    5 mG/acetaminophen 325 mG 1 Tablet(s) Oral every 4 hours PRN  oxyCODONE    5 mG/acetaminophen 325 mG 2 Tablet(s) Oral every 6 hours PRN    Anticoagulation:  enoxaparin Injectable 40 milliGRAM(s) SubCutaneous at bedtime    OTHER:  atorvastatin 80 milliGRAM(s) Oral at bedtime  BUpivacaine liposome 1.3% Injectable (no eMAR) 20 milliLiter(s) Local Injection once  dexamethasone     Tablet 2 milliGRAM(s) Oral every 6 hours  dexamethasone     Tablet 1 milliGRAM(s) Oral every 6 hours  dexamethasone     Tablet   Oral   dextrose 40% Gel 15 Gram(s) Oral once PRN  dextrose 50% Injectable 12.5 Gram(s) IV Push once  dextrose 50% Injectable 25 Gram(s) IV Push once  dextrose 50% Injectable 25 Gram(s) IV Push once  docusate sodium 100 milliGRAM(s) Oral three times a day  glucagon  Injectable 1 milliGRAM(s) IntraMuscular once PRN  insulin glargine Injectable (LANTUS) 10 Unit(s) SubCutaneous at bedtime  insulin lispro (HumaLOG) corrective regimen sliding scale   SubCutaneous Before meals and at bedtime  pantoprazole    Tablet 40 milliGRAM(s) Oral before breakfast  senna 2 Tablet(s) Oral at bedtime    IVF:  dextrose 5%. 1000 milliLiter(s) IV Continuous <Continuous>        ASSESSMENT:  72y Male s/p T10- pelvis instrumentation with fusion, POD3 neuro intact    PLAN:  NEURO:  - Neuro checks  - vitals checks  - pain control  - CT pelvis today  - CT lumbar spine today   - HMV x2, JPx2 in place, care as per plastics   - Needs TLSO brace, can be OOB without it until brace arrives   - Decadron taper  -Pain Managment  -Bowel regime    Dispo: Discussed with attending

## 2018-07-02 NOTE — PROGRESS NOTE ADULT - CARDIOVASCULAR
negative Regular rate & rhythm, normal S1, S2; no murmurs, gallops or rubs; no S3, S4 clear to auscultation bilaterally

## 2018-07-02 NOTE — PROGRESS NOTE ADULT - SUBJECTIVE AND OBJECTIVE BOX
Doing well.     Vital Signs Last 24 Hrs  T(C): 36.1 (02 Jul 2018 14:40), Max: 36.9 (01 Jul 2018 20:29)  T(F): 97 (02 Jul 2018 14:40), Max: 98.5 (01 Jul 2018 20:29)  HR: 94 (02 Jul 2018 14:40) (94 - 109)  BP: 143/73 (02 Jul 2018 14:40) (126/68 - 155/72)  BP(mean): --  RR: 16 (02 Jul 2018 14:40) (15 - 16)  SpO2: 97% (02 Jul 2018 14:40) (93% - 98%)    Incision with dressing intact. No collections.   JPs serosang 30/40. HV serosang 45/200

## 2018-07-02 NOTE — CONSULT NOTE ADULT - SUBJECTIVE AND OBJECTIVE BOX
REASON FOR CONSULT:    HISTORY OF PRESENT ILLNESS:  71 y/o male pmhx HTN, HLD, spinal stenosis s/p lumbar laminectomy last year presents today for elective T10-pelvis fusin today. Patient reports chronic LBP and ambulates with cane assistance. He denies any falls but reports imbalance with ambulating. He denies any leg pain, weakness, numbness or tingling.      PAST MEDICAL & SURGICAL HISTORY:  PAD (peripheral artery disease)  Hypercholesterolemia  Hypertension  H/O laminectomy  History of hernia repair  S/P hip replacement: right hip  S/P knee replacement, bilateral      [ ] Diabetes   [ ] Hypertension  [ ] Hyperlipidemia  [ ] CAD  [ ] PCI  [ ] CABG    PREVIOUS DIAGNOSTIC TESTING:    [ ] Echocardiogram:  [ ]  Catheterization:  [ ] Stress Test:  	    MEDICATIONS:        acetaminophen   Tablet 650 milliGRAM(s) Oral every 6 hours PRN  acetaminophen   Tablet. 650 milliGRAM(s) Oral every 6 hours PRN  HYDROmorphone  Injectable 1 milliGRAM(s) IV Push every 3 hours PRN  ondansetron Injectable 4 milliGRAM(s) IV Push every 6 hours PRN  oxyCODONE    5 mG/acetaminophen 325 mG 1 Tablet(s) Oral every 4 hours PRN  oxyCODONE    5 mG/acetaminophen 325 mG 2 Tablet(s) Oral every 6 hours PRN    docusate sodium 100 milliGRAM(s) Oral three times a day  pantoprazole    Tablet 40 milliGRAM(s) Oral before breakfast  senna 2 Tablet(s) Oral at bedtime    atorvastatin 80 milliGRAM(s) Oral at bedtime  dexamethasone     Tablet 1 milliGRAM(s) Oral every 6 hours  dexamethasone     Tablet   Oral   dextrose 40% Gel 15 Gram(s) Oral once PRN  dextrose 50% Injectable 12.5 Gram(s) IV Push once  dextrose 50% Injectable 25 Gram(s) IV Push once  dextrose 50% Injectable 25 Gram(s) IV Push once  glucagon  Injectable 1 milliGRAM(s) IntraMuscular once PRN  insulin glargine Injectable (LANTUS) 10 Unit(s) SubCutaneous at bedtime  insulin lispro (HumaLOG) corrective regimen sliding scale   SubCutaneous Before meals and at bedtime    dextrose 5%. 1000 milliLiter(s) IV Continuous <Continuous>  enoxaparin Injectable 40 milliGRAM(s) SubCutaneous at bedtime      FAMILY HISTORY:  No pertinent family history in first degree relatives      SOCIAL HISTORY:    [ ] Non-smoker  [ ] Smoker  [ ] Alcohol    Allergies    No Known Allergies    Intolerances    	    REVIEW OF SYSTEMS:    [x] as per HPI  CONSTITUTIONAL: No fever, weight loss, or fatigue  ENT:  No difficulty hearing, tinnitus, vertigo; No sinus or throat pain  RESPIRATORY: No cough, wheezing, chills or hemoptysis; No Shortness of Breath  CARDIOVASCULAR: No chest pain, palpitations, dizziness, or leg swelling  GASTROINTESTINAL: No abdominal or epigastric pain. No nausea, vomiting, or hematemesis; No diarrhea or constipation. No melena or hematochezia.  GENITOURINARY: No dysuria, frequency, hematuria, or incontinence  NEUROLOGICAL: No headaches, memory loss, loss of strength, numbness, or tremors  MUSCULOSKELETAL: No joint pain or swelling; No muscle, back, or extremity pain  [x] All others negative	  [ ] Unable to obtain    PHYSICAL EXAM:  T(C): 36.1 (07-02-18 @ 14:40), Max: 36.9 (07-01-18 @ 20:29)  HR: 94 (07-02-18 @ 14:40) (94 - 109)  BP: 143/73 (07-02-18 @ 14:40) (126/68 - 155/72)  RR: 16 (07-02-18 @ 14:40) (15 - 16)  SpO2: 97% (07-02-18 @ 14:40) (93% - 98%)  Wt(kg): --  I&O's Summary    01 Jul 2018 07:01  -  02 Jul 2018 07:00  --------------------------------------------------------  IN: 1225 mL / OUT: 2965 mL / NET: -1740 mL    02 Jul 2018 07:01  -  02 Jul 2018 15:44  --------------------------------------------------------  IN: 900 mL / OUT: 565 mL / NET: 335 mL        Appearance: Normal	  HEENT:   Normal oral mucosa, PERRL, EOMI	  Lymphatic: No lymphadenopathy  Cardiovascular: Normal S1 S2, No JVD, No murmurs, No edema  Respiratory: Lungs clear to auscultation	  Psychiatry: A & O x 3, Mood & affect appropriate  Gastrointestinal:  Soft, Non-tender, + BS	  Skin: No rashes, No ecchymoses, No cyanosis	  Neurologic: Non-focal  Extremities: Normal range of motion, No clubbing, cyanosis or edema  Vascular: Peripheral pulses palpable 2+ bilaterally    TELEMETRY: 	  NSR 8b NSVT occational PVCs, APCs   ECG:  < from: 12 Lead ECG (06.01.14 @ 20:31) >  Diagnosis Line Sinus rhythm with Premature atrial complexes  Otherwise normal ECG    < end of copied text >    ECHO:  STRESS:  CATH:  	  RADIOLOGY:  CXR:< from: Xray Chest 1 View- PORTABLE-Routine (06.30.18 @ 03:50) >  Portable exam at chest demonstrates endotracheal tube with tip overlying   thoracic inlet. No acute infiltrates. Mild scoliosis thoracic spine.   Surgical hardware noted overlying lower thoracic and lumbosacral spine.   Right effusion.    Impression: Right effusion.      < end of copied text >    CT:   US:   	  	  LABS:	 	    CARDIAC MARKERS:                                  7.9    11.7  )-----------( 153      ( 02 Jul 2018 07:18 )             24.2     07-02    140  |  104  |  23  ----------------------------<  150<H>  4.5   |  26  |  0.90    Ca    8.5      02 Jul 2018 07:18  Phos  2.0     07-02  Mg     2.2     07-02      proBNP:   Lipid Profile:   HgA1c:   TSH:     ASSESSMENT/PLAN: 	    #NSVT - likely 2/2 electrolyte imbalance.  Recommend K>4 Mg?2 and check PO4  Recommend 2D echo to evaluate LVEF   no clinical s/s ACS or cardiac decompensation  can start lopressor 12.5 bid    #CAD - ekg non ischemic no cp sob     #HTN - pain control  can start lopressor 12.5bid  Goals SBP<130mmHg    #CV Prevention  q3mo Fasting Lipid Profile, Goal LDL<100, statin as tolerated  q6week TSH  q3mo 25-OH Vitamin D Level, Goal 50, supplement as tolerated

## 2018-07-02 NOTE — PROGRESS NOTE ADULT - ASSESSMENT
S/p paraspinous muscle closure for spinal wound recon.    Dressing to be changed today by PRS.   Continue JPs.

## 2018-07-03 LAB
ANION GAP SERPL CALC-SCNC: 12 MMOL/L — SIGNIFICANT CHANGE UP (ref 5–17)
BUN SERPL-MCNC: 22 MG/DL — SIGNIFICANT CHANGE UP (ref 7–23)
CALCIUM SERPL-MCNC: 8.8 MG/DL — SIGNIFICANT CHANGE UP (ref 8.4–10.5)
CHLORIDE SERPL-SCNC: 102 MMOL/L — SIGNIFICANT CHANGE UP (ref 96–108)
CO2 SERPL-SCNC: 27 MMOL/L — SIGNIFICANT CHANGE UP (ref 22–31)
CREAT SERPL-MCNC: 0.96 MG/DL — SIGNIFICANT CHANGE UP (ref 0.5–1.3)
GLUCOSE BLDC GLUCOMTR-MCNC: 132 MG/DL — HIGH (ref 70–99)
GLUCOSE BLDC GLUCOMTR-MCNC: 139 MG/DL — HIGH (ref 70–99)
GLUCOSE BLDC GLUCOMTR-MCNC: 185 MG/DL — HIGH (ref 70–99)
GLUCOSE BLDC GLUCOMTR-MCNC: 191 MG/DL — HIGH (ref 70–99)
GLUCOSE SERPL-MCNC: 137 MG/DL — HIGH (ref 70–99)
HCT VFR BLD CALC: 26.5 % — LOW (ref 39–50)
HGB BLD-MCNC: 8.5 G/DL — LOW (ref 13–17)
MAGNESIUM SERPL-MCNC: 2 MG/DL — SIGNIFICANT CHANGE UP (ref 1.6–2.6)
MCHC RBC-ENTMCNC: 30.4 PG — SIGNIFICANT CHANGE UP (ref 27–34)
MCHC RBC-ENTMCNC: 32.1 G/DL — SIGNIFICANT CHANGE UP (ref 32–36)
MCV RBC AUTO: 94.6 FL — SIGNIFICANT CHANGE UP (ref 80–100)
OB PNL STL: NEGATIVE — SIGNIFICANT CHANGE UP
PHOSPHATE SERPL-MCNC: 2.1 MG/DL — LOW (ref 2.5–4.5)
PLATELET # BLD AUTO: 223 K/UL — SIGNIFICANT CHANGE UP (ref 150–400)
POTASSIUM SERPL-MCNC: 4 MMOL/L — SIGNIFICANT CHANGE UP (ref 3.5–5.3)
POTASSIUM SERPL-SCNC: 4 MMOL/L — SIGNIFICANT CHANGE UP (ref 3.5–5.3)
RBC # BLD: 2.8 M/UL — LOW (ref 4.2–5.8)
RBC # FLD: 14.8 % — SIGNIFICANT CHANGE UP (ref 10.3–16.9)
SODIUM SERPL-SCNC: 141 MMOL/L — SIGNIFICANT CHANGE UP (ref 135–145)
WBC # BLD: 12.7 K/UL — HIGH (ref 3.8–10.5)
WBC # FLD AUTO: 12.7 K/UL — HIGH (ref 3.8–10.5)

## 2018-07-03 PROCEDURE — 99232 SBSQ HOSP IP/OBS MODERATE 35: CPT

## 2018-07-03 PROCEDURE — 99232 SBSQ HOSP IP/OBS MODERATE 35: CPT | Mod: GC

## 2018-07-03 PROCEDURE — 93306 TTE W/DOPPLER COMPLETE: CPT | Mod: 26

## 2018-07-03 RX ORDER — SODIUM,POTASSIUM PHOSPHATES 278-250MG
1 POWDER IN PACKET (EA) ORAL EVERY 4 HOURS
Qty: 0 | Refills: 0 | Status: COMPLETED | OUTPATIENT
Start: 2018-07-03 | End: 2018-07-04

## 2018-07-03 RX ORDER — METOPROLOL TARTRATE 50 MG
25 TABLET ORAL EVERY 12 HOURS
Qty: 0 | Refills: 0 | Status: DISCONTINUED | OUTPATIENT
Start: 2018-07-03 | End: 2018-07-11

## 2018-07-03 RX ADMIN — OXYCODONE AND ACETAMINOPHEN 1 TABLET(S): 5; 325 TABLET ORAL at 13:50

## 2018-07-03 RX ADMIN — SENNA PLUS 2 TABLET(S): 8.6 TABLET ORAL at 21:37

## 2018-07-03 RX ADMIN — Medication 1 PACKET(S): at 22:32

## 2018-07-03 RX ADMIN — Medication 1 MILLIGRAM(S): at 06:53

## 2018-07-03 RX ADMIN — Medication 1 MILLIGRAM(S): at 12:50

## 2018-07-03 RX ADMIN — OXYCODONE AND ACETAMINOPHEN 1 TABLET(S): 5; 325 TABLET ORAL at 07:55

## 2018-07-03 RX ADMIN — Medication 2: at 17:51

## 2018-07-03 RX ADMIN — Medication 100 MILLIGRAM(S): at 21:37

## 2018-07-03 RX ADMIN — Medication 12.5 MILLIGRAM(S): at 10:56

## 2018-07-03 RX ADMIN — ENOXAPARIN SODIUM 40 MILLIGRAM(S): 100 INJECTION SUBCUTANEOUS at 21:37

## 2018-07-03 RX ADMIN — OXYCODONE AND ACETAMINOPHEN 1 TABLET(S): 5; 325 TABLET ORAL at 06:55

## 2018-07-03 RX ADMIN — OXYCODONE AND ACETAMINOPHEN 1 TABLET(S): 5; 325 TABLET ORAL at 00:20

## 2018-07-03 RX ADMIN — Medication 1 MILLIGRAM(S): at 00:18

## 2018-07-03 RX ADMIN — OXYCODONE AND ACETAMINOPHEN 1 TABLET(S): 5; 325 TABLET ORAL at 12:50

## 2018-07-03 RX ADMIN — ATORVASTATIN CALCIUM 80 MILLIGRAM(S): 80 TABLET, FILM COATED ORAL at 21:37

## 2018-07-03 RX ADMIN — OXYCODONE AND ACETAMINOPHEN 1 TABLET(S): 5; 325 TABLET ORAL at 01:20

## 2018-07-03 RX ADMIN — INSULIN GLARGINE 10 UNIT(S): 100 INJECTION, SOLUTION SUBCUTANEOUS at 22:32

## 2018-07-03 RX ADMIN — OXYCODONE AND ACETAMINOPHEN 1 TABLET(S): 5; 325 TABLET ORAL at 22:25

## 2018-07-03 RX ADMIN — PANTOPRAZOLE SODIUM 40 MILLIGRAM(S): 20 TABLET, DELAYED RELEASE ORAL at 21:37

## 2018-07-03 RX ADMIN — Medication 2: at 22:14

## 2018-07-03 RX ADMIN — Medication 25 MILLIGRAM(S): at 21:38

## 2018-07-03 RX ADMIN — OXYCODONE AND ACETAMINOPHEN 1 TABLET(S): 5; 325 TABLET ORAL at 21:38

## 2018-07-03 NOTE — PROGRESS NOTE ADULT - ASSESSMENT
S/p paraspinous muscle closure for spinal wound recon.    Q 3 days dressing change per plastic surgery team. Dressing changed at bedside today 7/3/18 Mon.   Continue Afia.

## 2018-07-03 NOTE — PROGRESS NOTE ADULT - SUBJECTIVE AND OBJECTIVE BOX
No complaints. No overnight event. Doing well     Vital Signs Last 24 Hrs  T(C): 36.9 (03 Jul 2018 04:23), Max: 37.4 (02 Jul 2018 21:53)  T(F): 98.4 (03 Jul 2018 04:23), Max: 99.3 (02 Jul 2018 21:53)  HR: 83 (03 Jul 2018 04:23) (83 - 100)  BP: 136/68 (03 Jul 2018 04:23) (126/68 - 156/79)  BP(mean): --  RR: 20 (03 Jul 2018 04:23) (16 - 20)  SpO2: 99% (03 Jul 2018 04:23) (94% - 99%)    Incision with dressing intact. No collections.   JPs serosang 28/35 HV serosang 183/30

## 2018-07-03 NOTE — DIETITIAN INITIAL EVALUATION ADULT. - NS AS NUTRI INTERV ED CONTENT
Pt was educated on increased needs post-op. Discussed optimal nutrient dense foods high in protein. Pt was receptive and expressed understanding.

## 2018-07-03 NOTE — DIETITIAN INITIAL EVALUATION ADULT. - OTHER INFO
73yo M s/p T10 to pelvis instrumentation and fusion w/ plastics closure. Pt seen OOB in chair. Currently on a regular diet and tolerating PO. Endorses good appetite, consuming >75% meals. Denies N/V. Last BM was loose stool. Reports feeling bloated. Discussed increased needs post-op. NFKA or dietary restrictions. Wt stable. Skin: surgical incision; GI WDL per flowsheet.

## 2018-07-03 NOTE — PROGRESS NOTE ADULT - SUBJECTIVE AND OBJECTIVE BOX
HPI:  71 y/o male pmhx HTN, HLD, spinal stenosis s/p lumbar laminectomy last year presents today for elective T10-pelvis fusin today. Patient reports chronic LBP and ambulates with cane assistance. He denies any falls but reports imbalance with ambulating. He denies any leg pain, weakness, numbness or tingling. (29 Jun 2018 07:19)    OVERNIGHT EVENTS:  Vital Signs Last 24 Hrs  T(C): 36.9 (03 Jul 2018 04:23), Max: 37.4 (02 Jul 2018 21:53)  T(F): 98.4 (03 Jul 2018 04:23), Max: 99.3 (02 Jul 2018 21:53)  HR: 83 (03 Jul 2018 04:23) (83 - 96)  BP: 136/68 (03 Jul 2018 04:23) (126/68 - 156/79)  BP(mean): --  RR: 20 (03 Jul 2018 04:23) (16 - 20)  SpO2: 99% (03 Jul 2018 04:23) (94% - 99%)    I&O's Summary    02 Jul 2018 07:01  -  03 Jul 2018 07:00  --------------------------------------------------------  IN: 1370 mL / OUT: 675 mL / NET: 695 mL        Neurological:  PHYSICAL EXAM:  Neurological: AA+Ox3, opens eyes spontaneously, FC  CN II-XII: grossly intact, PERRL, EOMI  Motor exam: MAEx4, RLE 4-/5, otherwise 5/5 strength throughout  Cardiovascular: regular rate and rhythm  Respiratory: clear to auscultation  Gastrointestinal: soft, nontender, nondistended  Incision/Wound: C/D/I with KARON and hemovac as per Plastic Surgery managment    Cardiovascular: RRR  Respiratory: Lungs CTAB  Gastrointestinal: +BS  Genitourinary: voiding without difficulty  Extremities: wqrm and dry  Incision/Wound: CDI    DIET:  Regular      LABS:                        8.5    12.7  )-----------( 223      ( 03 Jul 2018 07:00 )             26.5     07-03    141  |  102  |  22  ----------------------------<  137<H>  4.0   |  27  |  0.96    Ca    8.8      03 Jul 2018 07:00  Phos  2.1     07-03  Mg     2.0     07-03      CAPILLARY BLOOD GLUCOSE      POCT Blood Glucose.: 139 mg/dL (03 Jul 2018 06:49)  POCT Blood Glucose.: 150 mg/dL (02 Jul 2018 22:09)  POCT Blood Glucose.: 170 mg/dL (02 Jul 2018 17:17)  POCT Blood Glucose.: 258 mg/dL (02 Jul 2018 11:49)      Allergies    No Known Allergies    Intolerances      MEDICATIONS:  Antibiotics:    Neuro:  acetaminophen   Tablet 650 milliGRAM(s) Oral every 6 hours PRN  acetaminophen   Tablet. 650 milliGRAM(s) Oral every 6 hours PRN  HYDROmorphone  Injectable 1 milliGRAM(s) IV Push every 3 hours PRN  ondansetron Injectable 4 milliGRAM(s) IV Push every 6 hours PRN  oxyCODONE    5 mG/acetaminophen 325 mG 1 Tablet(s) Oral every 4 hours PRN  oxyCODONE    5 mG/acetaminophen 325 mG 2 Tablet(s) Oral every 6 hours PRN    Anticoagulation:  enoxaparin Injectable 40 milliGRAM(s) SubCutaneous at bedtime    OTHER:  atorvastatin 80 milliGRAM(s) Oral at bedtime  BUpivacaine liposome 1.3% Injectable (no eMAR) 20 milliLiter(s) Local Injection once  dexamethasone     Tablet 1 milliGRAM(s) Oral every 6 hours  dexamethasone     Tablet   Oral   dextrose 40% Gel 15 Gram(s) Oral once PRN  dextrose 50% Injectable 12.5 Gram(s) IV Push once  dextrose 50% Injectable 25 Gram(s) IV Push once  dextrose 50% Injectable 25 Gram(s) IV Push once  docusate sodium 100 milliGRAM(s) Oral three times a day  glucagon  Injectable 1 milliGRAM(s) IntraMuscular once PRN  insulin glargine Injectable (LANTUS) 10 Unit(s) SubCutaneous at bedtime  insulin lispro (HumaLOG) corrective regimen sliding scale   SubCutaneous Before meals and at bedtime  metoprolol tartrate 12.5 milliGRAM(s) Oral every 12 hours  pantoprazole    Tablet 40 milliGRAM(s) Oral before breakfast  senna 2 Tablet(s) Oral at bedtime    IVF:  dextrose 5%. 1000 milliLiter(s) IV Continuous <Continuous>    ASSESSMENT:  72y Male s/p   · Operative Findings	T10 to pelvis instrumentation and fusion w/ plastics closure	      PLAN:    NEURO:    Monitor neuro status  OT/PT  F/U hemovac and KARON output  Pain Managment  Bowel regime  Continue current medical regime    Dispo: Discussed with attending

## 2018-07-03 NOTE — PROGRESS NOTE ADULT - SUBJECTIVE AND OBJECTIVE BOX
Chief Complaint/Reason for Consult: cv mgmt  INTERVAL HPI: tele reviewed, fromquent apcs, atach no afib no vt no palp no syncope  	  MEDICATIONS:  metoprolol tartrate 12.5 milliGRAM(s) Oral every 12 hours        acetaminophen   Tablet 650 milliGRAM(s) Oral every 6 hours PRN  acetaminophen   Tablet. 650 milliGRAM(s) Oral every 6 hours PRN  HYDROmorphone  Injectable 1 milliGRAM(s) IV Push every 3 hours PRN  ondansetron Injectable 4 milliGRAM(s) IV Push every 6 hours PRN  oxyCODONE    5 mG/acetaminophen 325 mG 1 Tablet(s) Oral every 4 hours PRN  oxyCODONE    5 mG/acetaminophen 325 mG 2 Tablet(s) Oral every 6 hours PRN    docusate sodium 100 milliGRAM(s) Oral three times a day  pantoprazole    Tablet 40 milliGRAM(s) Oral before breakfast  senna 2 Tablet(s) Oral at bedtime    atorvastatin 80 milliGRAM(s) Oral at bedtime  dexamethasone     Tablet 1 milliGRAM(s) Oral every 6 hours  dexamethasone     Tablet   Oral   dextrose 40% Gel 15 Gram(s) Oral once PRN  dextrose 50% Injectable 12.5 Gram(s) IV Push once  dextrose 50% Injectable 25 Gram(s) IV Push once  dextrose 50% Injectable 25 Gram(s) IV Push once  glucagon  Injectable 1 milliGRAM(s) IntraMuscular once PRN  insulin glargine Injectable (LANTUS) 10 Unit(s) SubCutaneous at bedtime  insulin lispro (HumaLOG) corrective regimen sliding scale   SubCutaneous Before meals and at bedtime    dextrose 5%. 1000 milliLiter(s) IV Continuous <Continuous>  enoxaparin Injectable 40 milliGRAM(s) SubCutaneous at bedtime      REVIEW OF SYSTEMS:  [x] As per HPI  CONSTITUTIONAL: No fever, weight loss, or fatigue  RESPIRATORY: No cough, wheezing, chills or hemoptysis; No Shortness of Breath  CARDIOVASCULAR: No chest pain, palpitations, dizziness, or leg swelling  GASTROINTESTINAL: No abdominal or epigastric pain. No nausea, vomiting, or hematemesis; No diarrhea or constipation. No melena or hematochezia.  MUSCULOSKELETAL: No joint pain or swelling; No muscle, back, or extremity pain  [x] All others negative	  [ ] Unable to obtain    PHYSICAL EXAM:  T(C): 36.9 (07-03-18 @ 04:23), Max: 37.4 (07-02-18 @ 21:53)  HR: 83 (07-03-18 @ 04:23) (83 - 100)  BP: 136/68 (07-03-18 @ 04:23) (126/68 - 156/79)  RR: 20 (07-03-18 @ 04:23) (16 - 20)  SpO2: 99% (07-03-18 @ 04:23) (94% - 99%)  Wt(kg): --  I&O's Summary    02 Jul 2018 07:01  -  03 Jul 2018 07:00  --------------------------------------------------------  IN: 1370 mL / OUT: 675 mL / NET: 695 mL          Appearance: Normal	  HEENT:   Normal oral mucosa  Cardiovascular: Normal S1 S2, No JVD, No murmurs, No edema  Respiratory: Lungs clear to auscultation	  Gastrointestinal:  Soft, Non-tender, + BS	  Extremities: Normal range of motion, No clubbing, cyanosis or edema  Vascular: Peripheral pulses palpable 2+ bilaterally    TELEMETRY: 	    ECG:   	  RADIOLOGY:   CXR:  CT:  US:    CARDIAC TESTING:  Echocardiogram:  Catheterization:  Stress Test:      LABS:	 	    CARDIAC MARKERS:                                  7.9    11.7  )-----------( 153      ( 02 Jul 2018 07:18 )             24.2     07-02    140  |  104  |  23  ----------------------------<  150<H>  4.5   |  26  |  0.90    Ca    8.5      02 Jul 2018 07:18  Phos  2.0     07-02  Mg     2.2     07-02      proBNP:   Lipid Profile:   HgA1c:   TSH:     ASSESSMENT/PLAN: 	    # Ectopy - recommend increase Lopressor to 25 BID and titrate as SBP tolerates to decrease ectopy  2D ech nabila evalaute iff ectopy has effected LVEF, thos clinically no s/s/ cardiac decompensation  replete electrolytes  anemia - HbG 7.9 - consider PRBC resuscitation      #CAD - ekg non ischemic no cp sob     #HTN - pain control  titrate BB as per above  Goals SBP<130mmHg    #CV Prevention -   q3mo Fasting Lipid Profile, Goal LDL<100, statin as tolerated.  q6week TSH check  q3mo 25-OHD Vitamin D Level, Goal 50, supplement as tolerated

## 2018-07-04 LAB
ANION GAP SERPL CALC-SCNC: 10 MMOL/L — SIGNIFICANT CHANGE UP (ref 5–17)
BUN SERPL-MCNC: 24 MG/DL — HIGH (ref 7–23)
CALCIUM SERPL-MCNC: 8.7 MG/DL — SIGNIFICANT CHANGE UP (ref 8.4–10.5)
CHLORIDE SERPL-SCNC: 102 MMOL/L — SIGNIFICANT CHANGE UP (ref 96–108)
CO2 SERPL-SCNC: 28 MMOL/L — SIGNIFICANT CHANGE UP (ref 22–31)
CREAT SERPL-MCNC: 1.07 MG/DL — SIGNIFICANT CHANGE UP (ref 0.5–1.3)
GLUCOSE BLDC GLUCOMTR-MCNC: 111 MG/DL — HIGH (ref 70–99)
GLUCOSE BLDC GLUCOMTR-MCNC: 112 MG/DL — HIGH (ref 70–99)
GLUCOSE BLDC GLUCOMTR-MCNC: 114 MG/DL — HIGH (ref 70–99)
GLUCOSE BLDC GLUCOMTR-MCNC: 184 MG/DL — HIGH (ref 70–99)
GLUCOSE SERPL-MCNC: 105 MG/DL — HIGH (ref 70–99)
HCT VFR BLD CALC: 24.5 % — LOW (ref 39–50)
HGB BLD-MCNC: 7.8 G/DL — LOW (ref 13–17)
MAGNESIUM SERPL-MCNC: 1.8 MG/DL — SIGNIFICANT CHANGE UP (ref 1.6–2.6)
MCHC RBC-ENTMCNC: 30.5 PG — SIGNIFICANT CHANGE UP (ref 27–34)
MCHC RBC-ENTMCNC: 31.8 G/DL — LOW (ref 32–36)
MCV RBC AUTO: 95.7 FL — SIGNIFICANT CHANGE UP (ref 80–100)
PHOSPHATE SERPL-MCNC: 2.9 MG/DL — SIGNIFICANT CHANGE UP (ref 2.5–4.5)
PLATELET # BLD AUTO: 205 K/UL — SIGNIFICANT CHANGE UP (ref 150–400)
POTASSIUM SERPL-MCNC: 4.3 MMOL/L — SIGNIFICANT CHANGE UP (ref 3.5–5.3)
POTASSIUM SERPL-SCNC: 4.3 MMOL/L — SIGNIFICANT CHANGE UP (ref 3.5–5.3)
RBC # BLD: 2.56 M/UL — LOW (ref 4.2–5.8)
RBC # FLD: 14.5 % — SIGNIFICANT CHANGE UP (ref 10.3–16.9)
SODIUM SERPL-SCNC: 140 MMOL/L — SIGNIFICANT CHANGE UP (ref 135–145)
WBC # BLD: 11.4 K/UL — HIGH (ref 3.8–10.5)
WBC # FLD AUTO: 11.4 K/UL — HIGH (ref 3.8–10.5)

## 2018-07-04 PROCEDURE — 99232 SBSQ HOSP IP/OBS MODERATE 35: CPT

## 2018-07-04 PROCEDURE — 99232 SBSQ HOSP IP/OBS MODERATE 35: CPT | Mod: GC

## 2018-07-04 RX ADMIN — INSULIN GLARGINE 10 UNIT(S): 100 INJECTION, SOLUTION SUBCUTANEOUS at 21:34

## 2018-07-04 RX ADMIN — ENOXAPARIN SODIUM 40 MILLIGRAM(S): 100 INJECTION SUBCUTANEOUS at 21:10

## 2018-07-04 RX ADMIN — Medication 25 MILLIGRAM(S): at 06:26

## 2018-07-04 RX ADMIN — OXYCODONE AND ACETAMINOPHEN 2 TABLET(S): 5; 325 TABLET ORAL at 10:00

## 2018-07-04 RX ADMIN — Medication 2: at 21:16

## 2018-07-04 RX ADMIN — OXYCODONE AND ACETAMINOPHEN 2 TABLET(S): 5; 325 TABLET ORAL at 09:04

## 2018-07-04 RX ADMIN — ATORVASTATIN CALCIUM 80 MILLIGRAM(S): 80 TABLET, FILM COATED ORAL at 21:10

## 2018-07-04 RX ADMIN — Medication 1 MILLIGRAM(S): at 17:36

## 2018-07-04 RX ADMIN — PANTOPRAZOLE SODIUM 40 MILLIGRAM(S): 20 TABLET, DELAYED RELEASE ORAL at 21:10

## 2018-07-04 RX ADMIN — OXYCODONE AND ACETAMINOPHEN 2 TABLET(S): 5; 325 TABLET ORAL at 03:02

## 2018-07-04 RX ADMIN — Medication 25 MILLIGRAM(S): at 17:36

## 2018-07-04 RX ADMIN — OXYCODONE AND ACETAMINOPHEN 2 TABLET(S): 5; 325 TABLET ORAL at 18:35

## 2018-07-04 RX ADMIN — OXYCODONE AND ACETAMINOPHEN 2 TABLET(S): 5; 325 TABLET ORAL at 04:00

## 2018-07-04 RX ADMIN — Medication 1 MILLIGRAM(S): at 06:26

## 2018-07-04 RX ADMIN — OXYCODONE AND ACETAMINOPHEN 2 TABLET(S): 5; 325 TABLET ORAL at 17:36

## 2018-07-04 RX ADMIN — Medication 1 PACKET(S): at 02:24

## 2018-07-04 RX ADMIN — OXYCODONE AND ACETAMINOPHEN 2 TABLET(S): 5; 325 TABLET ORAL at 23:38

## 2018-07-04 NOTE — PROGRESS NOTE ADULT - SUBJECTIVE AND OBJECTIVE BOX
HPI:  71 y/o male pmhx HTN, HLD, spinal stenosis s/p lumbar laminectomy last year presents today for elective T10-pelvis fusin today. Patient reports chronic LBP and ambulates with cane assistance. He denies any falls but reports imbalance with ambulating. He denies any leg pain, weakness, numbness or tingling. (29 Jun 2018 07:19)    OVERNIGHT EVENTS: POD# 5 T10 to pelvis instrumentation and fusion w/ plastics closure.  No significant event overnight. Patient is requesting the removal of drains.  He is getting OOB and walking the hallway with walker. Denies LE weakness.  Cardiology note appreciated. Surgical dressing changed yesterday by plastics, drains managed by plastics.   Vital Signs Last 24 Hrs  T(C): 37.4 (04 Jul 2018 08:40), Max: 37.4 (04 Jul 2018 08:40)  T(F): 99.4 (04 Jul 2018 08:40), Max: 99.4 (04 Jul 2018 08:40)  HR: 85 (04 Jul 2018 08:40) (77 - 92)  BP: 115/54 (04 Jul 2018 08:40) (115/54 - 144/70)  BP(mean): --  RR: 15 (04 Jul 2018 08:40) (15 - 18)  SpO2: 97% (04 Jul 2018 08:40) (95% - 98%)    I&O's Summary    03 Jul 2018 07:01  -  04 Jul 2018 07:00  --------------------------------------------------------  IN: 1300 mL / OUT: 205.5 mL / NET: 1094.5 mL        PHYSICAL EXAM:  Neurological: AA+Ox3, opens eyes spontaneously, FC  CN II-XII: grossly intact, PERRL, EOMI  Motor exam: MAEx4, RLE 4-/5, otherwise 5/5 strength throughout  Cardiovascular: regular rate and rhythm  Respiratory: clear to auscultation  Gastrointestinal: soft, nontender, nondistended  Incision/Wound: C/D/I with KARON and hemovac as per Plastic Surgery managment        [] Wound Drains: drains x 4. keep it as per plastics      DIET: regular  [] NPO  [] Mechanical  [] Tube feeds    LABS:                        7.8    11.4  )-----------( 205      ( 04 Jul 2018 07:05 )             24.5     07-04    140  |  102  |  24<H>  ----------------------------<  105<H>  4.3   |  28  |  1.07    Ca    8.7      04 Jul 2018 07:05  Phos  2.9     07-04  Mg     1.8     07-04              CAPILLARY BLOOD GLUCOSE      POCT Blood Glucose.: 112 mg/dL (04 Jul 2018 07:01)  POCT Blood Glucose.: 191 mg/dL (03 Jul 2018 21:41)  POCT Blood Glucose.: 185 mg/dL (03 Jul 2018 17:26)  POCT Blood Glucose.: 132 mg/dL (03 Jul 2018 11:51)      Drug Levels: [] N/A    CSF Analysis: [] N/A      Allergies    No Known Allergies    Intolerances      MEDICATIONS:  Antibiotics:    Neuro:  acetaminophen   Tablet 650 milliGRAM(s) Oral every 6 hours PRN  acetaminophen   Tablet. 650 milliGRAM(s) Oral every 6 hours PRN  HYDROmorphone  Injectable 1 milliGRAM(s) IV Push every 3 hours PRN  ondansetron Injectable 4 milliGRAM(s) IV Push every 6 hours PRN  oxyCODONE    5 mG/acetaminophen 325 mG 1 Tablet(s) Oral every 4 hours PRN  oxyCODONE    5 mG/acetaminophen 325 mG 2 Tablet(s) Oral every 6 hours PRN    Anticoagulation:  enoxaparin Injectable 40 milliGRAM(s) SubCutaneous at bedtime    OTHER:  atorvastatin 80 milliGRAM(s) Oral at bedtime  BUpivacaine liposome 1.3% Injectable (no eMAR) 20 milliLiter(s) Local Injection once  dexamethasone     Tablet 1 milliGRAM(s) Oral every 12 hours  dexamethasone     Tablet   Oral   dextrose 40% Gel 15 Gram(s) Oral once PRN  dextrose 50% Injectable 12.5 Gram(s) IV Push once  dextrose 50% Injectable 25 Gram(s) IV Push once  dextrose 50% Injectable 25 Gram(s) IV Push once  docusate sodium 100 milliGRAM(s) Oral three times a day  glucagon  Injectable 1 milliGRAM(s) IntraMuscular once PRN  insulin glargine Injectable (LANTUS) 10 Unit(s) SubCutaneous at bedtime  insulin lispro (HumaLOG) corrective regimen sliding scale   SubCutaneous Before meals and at bedtime  metoprolol tartrate 25 milliGRAM(s) Oral every 12 hours  pantoprazole    Tablet 40 milliGRAM(s) Oral before breakfast  senna 2 Tablet(s) Oral at bedtime    IVF:  dextrose 5%. 1000 milliLiter(s) IV Continuous <Continuous>    CULTURES:    RADIOLOGY & ADDITIONAL TESTS:      ASSESSMENT:  72y Male s/p day 5 T100 - pelvis fusion instrumentation.     PLAN:  Monitor neuro status  OT/PT - ambulatre with walker.  F/U hemovac and KARON output  Pain Managment  Bowel regime  Continue current medical regime    DVT PROPHYLAXIS:  [x] Venodynes                                [x] Heparin/Lovenox    DISPOSITION: pending PT.

## 2018-07-04 NOTE — PROGRESS NOTE ADULT - SUBJECTIVE AND OBJECTIVE BOX
No complaints. No overnight events, small drainage.    Vital Signs Last 24 Hrs  T(C): 36.9 (07-04-18 @ 05:21), Max: 37.3 (07-03-18 @ 21:01)  T(F): 98.4 (07-04-18 @ 05:21), Max: 99.1 (07-03-18 @ 21:01)  HR: 82 (07-04-18 @ 05:21) (77 - 92)  BP: 118/83 (07-04-18 @ 05:21) (118/83 - 144/70)  BP(mean): --  RR: 17 (07-04-18 @ 05:21) (16 - 18)  SpO2: 96% (07-04-18 @ 05:21) (95% - 98%)  I&O's Detail    03 Jul 2018 07:01  -  04 Jul 2018 07:00  --------------------------------------------------------  IN:    Oral Fluid: 1300 mL  Total IN: 1300 mL    OUT:    Bulb: 25 mL    Bulb: 33 mL    Evacuated Tube System: 130 mL    Evacuated Tube System: 17.5 mL  Total OUT: 205.5 mL    Total NET: 1094.5 mL      Incision with dressing intact. No collections.

## 2018-07-04 NOTE — PROGRESS NOTE ADULT - ASSESSMENT
S/p paraspinous muscle closure for spinal wound recon.    Q 3 days dressing change per plastic surgery team. Dressing changed at bedside today 7/3/18 Mon.   Continue Afia. S/p paraspinous muscle closure for spinal wound recon.    Q 3 days dressing change per plastic surgery team. Dressing changed at bedside today 7/3/18 Mon.   Continue JPs.   Aquacel may be replaced if rolling up

## 2018-07-04 NOTE — PROGRESS NOTE ADULT - SUBJECTIVE AND OBJECTIVE BOX
Chief Complaint/Reason for Consult: cv mgmt  INTERVAL HPI: tele reviewed, ectopy improving,  no palp no syncope  	  MEDICATIONS:  metoprolol tartrate 25 milliGRAM(s) Oral every 12 hours        acetaminophen   Tablet 650 milliGRAM(s) Oral every 6 hours PRN  acetaminophen   Tablet. 650 milliGRAM(s) Oral every 6 hours PRN  HYDROmorphone  Injectable 1 milliGRAM(s) IV Push every 3 hours PRN  ondansetron Injectable 4 milliGRAM(s) IV Push every 6 hours PRN  oxyCODONE    5 mG/acetaminophen 325 mG 1 Tablet(s) Oral every 4 hours PRN  oxyCODONE    5 mG/acetaminophen 325 mG 2 Tablet(s) Oral every 6 hours PRN    docusate sodium 100 milliGRAM(s) Oral three times a day  pantoprazole    Tablet 40 milliGRAM(s) Oral before breakfast  senna 2 Tablet(s) Oral at bedtime    atorvastatin 80 milliGRAM(s) Oral at bedtime  dexamethasone     Tablet 1 milliGRAM(s) Oral every 12 hours  dexamethasone     Tablet   Oral   dextrose 40% Gel 15 Gram(s) Oral once PRN  dextrose 50% Injectable 12.5 Gram(s) IV Push once  dextrose 50% Injectable 25 Gram(s) IV Push once  dextrose 50% Injectable 25 Gram(s) IV Push once  glucagon  Injectable 1 milliGRAM(s) IntraMuscular once PRN  insulin glargine Injectable (LANTUS) 10 Unit(s) SubCutaneous at bedtime  insulin lispro (HumaLOG) corrective regimen sliding scale   SubCutaneous Before meals and at bedtime    dextrose 5%. 1000 milliLiter(s) IV Continuous <Continuous>  enoxaparin Injectable 40 milliGRAM(s) SubCutaneous at bedtime      REVIEW OF SYSTEMS:  [x] As per HPI  CONSTITUTIONAL: No fever, weight loss, or fatigue  RESPIRATORY: No cough, wheezing, chills or hemoptysis; No Shortness of Breath  CARDIOVASCULAR: No chest pain, palpitations, dizziness, or leg swelling  GASTROINTESTINAL: No abdominal or epigastric pain. No nausea, vomiting, or hematemesis; No diarrhea or constipation. No melena or hematochezia.  MUSCULOSKELETAL: No joint pain or swelling; No muscle, back, or extremity pain  [x] All others negative	  [ ] Unable to obtain    PHYSICAL EXAM:  T(C): 37.4 (07-04-18 @ 08:40), Max: 37.4 (07-04-18 @ 08:40)  HR: 85 (07-04-18 @ 08:40) (77 - 87)  BP: 115/54 (07-04-18 @ 08:40) (115/54 - 138/70)  RR: 15 (07-04-18 @ 08:40) (15 - 17)  SpO2: 97% (07-04-18 @ 08:40) (95% - 98%)  Wt(kg): --  I&O's Summary    03 Jul 2018 07:01  -  04 Jul 2018 07:00  --------------------------------------------------------  IN: 1300 mL / OUT: 205.5 mL / NET: 1094.5 mL          Appearance: Normal	  HEENT:   Normal oral mucosa  Cardiovascular: Normal S1 S2, No JVD, No murmurs, No edema  Respiratory: Lungs clear to auscultation	  Gastrointestinal:  Soft, Non-tender, + BS	  Extremities: Normal range of motion, No clubbing, cyanosis or edema  Vascular: Peripheral pulses palpable 2+ bilaterally    TELEMETRY: 	    ECG:   	  RADIOLOGY:   CXR:  CT:  US:    CARDIAC TESTING:  Echocardiogram:< from: TTE Echo w/Cont Complete (07.03.18 @ 09:43) >  Normal left ventricular size and wall   thickness.The left ventricular wall motion is normal.The left ventricular   ejection fraction is estimated to be 60-65%The left atrial size is   normal.Right atrial size is normal.Structurally normal aortic valve.No   aortic   regurgitation noted.Structurally normal mitral valve.No mitral   regurgitation   noted.No aortic root dilatation.There is no pericardial effusion.    < end of copied text >    Catheterization:  Stress Test:      LABS:	 	    CARDIAC MARKERS:                                  7.8    11.4  )-----------( 205      ( 04 Jul 2018 07:05 )             24.5     07-04    140  |  102  |  24<H>  ----------------------------<  105<H>  4.3   |  28  |  1.07    Ca    8.7      04 Jul 2018 07:05  Phos  2.9     07-04  Mg     1.8     07-04      proBNP:   Lipid Profile:   HgA1c:   TSH:     ASSESSMENT/PLAN: 	    # Ectopy - improved, continuie  Lopressor to 25 BID  2D echo - normal LVEF no RWMA normal pressures  clinically no s/s/ cardiac decompensation  replete electrolytes    #CAD - ekg non ischemic no cp sob     #HTN - pain control  titrate BB as per above  Goals SBP<130mmHg    #CV Prevention -   q3mo Fasting Lipid Profile, Goal LDL<100, statin as tolerated.  q6week TSH check  q3mo 25-OHD Vitamin D Level, Goal 50, supplement as

## 2018-07-05 LAB
ALBUMIN SERPL ELPH-MCNC: 3.3 G/DL — SIGNIFICANT CHANGE UP (ref 3.3–5)
ALP SERPL-CCNC: 39 U/L — LOW (ref 40–120)
ALT FLD-CCNC: 25 U/L — SIGNIFICANT CHANGE UP (ref 10–45)
ANION GAP SERPL CALC-SCNC: 10 MMOL/L — SIGNIFICANT CHANGE UP (ref 5–17)
APPEARANCE UR: ABNORMAL
AST SERPL-CCNC: 23 U/L — SIGNIFICANT CHANGE UP (ref 10–40)
BACTERIA # UR AUTO: PRESENT /HPF
BILIRUB SERPL-MCNC: 0.5 MG/DL — SIGNIFICANT CHANGE UP (ref 0.2–1.2)
BILIRUB UR-MCNC: NEGATIVE — SIGNIFICANT CHANGE UP
BLD GP AB SCN SERPL QL: NEGATIVE — SIGNIFICANT CHANGE UP
BUN SERPL-MCNC: 19 MG/DL — SIGNIFICANT CHANGE UP (ref 7–23)
CALCIUM SERPL-MCNC: 8.8 MG/DL — SIGNIFICANT CHANGE UP (ref 8.4–10.5)
CHLORIDE SERPL-SCNC: 99 MMOL/L — SIGNIFICANT CHANGE UP (ref 96–108)
CO2 SERPL-SCNC: 27 MMOL/L — SIGNIFICANT CHANGE UP (ref 22–31)
COLOR SPEC: YELLOW — SIGNIFICANT CHANGE UP
COMMENT - URINE: SIGNIFICANT CHANGE UP
CREAT SERPL-MCNC: 0.91 MG/DL — SIGNIFICANT CHANGE UP (ref 0.5–1.3)
DIFF PNL FLD: ABNORMAL
EPI CELLS # UR: SIGNIFICANT CHANGE UP /HPF (ref 0–5)
GLUCOSE BLDC GLUCOMTR-MCNC: 112 MG/DL — HIGH (ref 70–99)
GLUCOSE BLDC GLUCOMTR-MCNC: 113 MG/DL — HIGH (ref 70–99)
GLUCOSE BLDC GLUCOMTR-MCNC: 127 MG/DL — HIGH (ref 70–99)
GLUCOSE BLDC GLUCOMTR-MCNC: 222 MG/DL — HIGH (ref 70–99)
GLUCOSE BLDC GLUCOMTR-MCNC: 251 MG/DL — HIGH (ref 70–99)
GLUCOSE SERPL-MCNC: 104 MG/DL — HIGH (ref 70–99)
GLUCOSE UR QL: NEGATIVE — SIGNIFICANT CHANGE UP
HCT VFR BLD CALC: 27.5 % — LOW (ref 39–50)
HCT VFR BLD CALC: 27.7 % — LOW (ref 39–50)
HGB BLD-MCNC: 8.8 G/DL — LOW (ref 13–17)
HGB BLD-MCNC: 8.9 G/DL — LOW (ref 13–17)
KETONES UR-MCNC: NEGATIVE — SIGNIFICANT CHANGE UP
LEUKOCYTE ESTERASE UR-ACNC: NEGATIVE — SIGNIFICANT CHANGE UP
MCHC RBC-ENTMCNC: 30.3 PG — SIGNIFICANT CHANGE UP (ref 27–34)
MCHC RBC-ENTMCNC: 30.4 PG — SIGNIFICANT CHANGE UP (ref 27–34)
MCHC RBC-ENTMCNC: 32 G/DL — SIGNIFICANT CHANGE UP (ref 32–36)
MCHC RBC-ENTMCNC: 32.1 G/DL — SIGNIFICANT CHANGE UP (ref 32–36)
MCV RBC AUTO: 94.5 FL — SIGNIFICANT CHANGE UP (ref 80–100)
MCV RBC AUTO: 94.8 FL — SIGNIFICANT CHANGE UP (ref 80–100)
NITRITE UR-MCNC: NEGATIVE — SIGNIFICANT CHANGE UP
PH UR: 7 — SIGNIFICANT CHANGE UP (ref 5–8)
PLATELET # BLD AUTO: 257 K/UL — SIGNIFICANT CHANGE UP (ref 150–400)
PLATELET # BLD AUTO: 262 K/UL — SIGNIFICANT CHANGE UP (ref 150–400)
POTASSIUM SERPL-MCNC: 4 MMOL/L — SIGNIFICANT CHANGE UP (ref 3.5–5.3)
POTASSIUM SERPL-SCNC: 4 MMOL/L — SIGNIFICANT CHANGE UP (ref 3.5–5.3)
PROT SERPL-MCNC: 5.7 G/DL — LOW (ref 6–8.3)
PROT UR-MCNC: NEGATIVE MG/DL — SIGNIFICANT CHANGE UP
RBC # BLD: 2.9 M/UL — LOW (ref 4.2–5.8)
RBC # BLD: 2.93 M/UL — LOW (ref 4.2–5.8)
RBC # FLD: 14.5 % — SIGNIFICANT CHANGE UP (ref 10.3–16.9)
RBC # FLD: 14.6 % — SIGNIFICANT CHANGE UP (ref 10.3–16.9)
RBC CASTS # UR COMP ASSIST: ABNORMAL /HPF
RH IG SCN BLD-IMP: POSITIVE — SIGNIFICANT CHANGE UP
SODIUM SERPL-SCNC: 136 MMOL/L — SIGNIFICANT CHANGE UP (ref 135–145)
SP GR SPEC: <=1.005 — SIGNIFICANT CHANGE UP (ref 1–1.03)
UROBILINOGEN FLD QL: 0.2 E.U./DL — SIGNIFICANT CHANGE UP
WBC # BLD: 13.7 K/UL — HIGH (ref 3.8–10.5)
WBC # BLD: 14.4 K/UL — HIGH (ref 3.8–10.5)
WBC # FLD AUTO: 13.7 K/UL — HIGH (ref 3.8–10.5)
WBC # FLD AUTO: 14.4 K/UL — HIGH (ref 3.8–10.5)
WBC UR QL: < 5 /HPF — SIGNIFICANT CHANGE UP

## 2018-07-05 PROCEDURE — 71045 X-RAY EXAM CHEST 1 VIEW: CPT | Mod: 26

## 2018-07-05 PROCEDURE — 99232 SBSQ HOSP IP/OBS MODERATE 35: CPT | Mod: GC

## 2018-07-05 RX ORDER — INSULIN LISPRO 100/ML
6 VIAL (ML) SUBCUTANEOUS
Qty: 0 | Refills: 0 | Status: DISCONTINUED | OUTPATIENT
Start: 2018-07-05 | End: 2018-07-05

## 2018-07-05 RX ORDER — SODIUM CHLORIDE 9 MG/ML
1000 INJECTION INTRAMUSCULAR; INTRAVENOUS; SUBCUTANEOUS
Qty: 0 | Refills: 0 | Status: DISCONTINUED | OUTPATIENT
Start: 2018-07-05 | End: 2018-07-05

## 2018-07-05 RX ORDER — OXYCODONE HYDROCHLORIDE 5 MG/1
10 TABLET ORAL ONCE
Qty: 0 | Refills: 0 | Status: DISCONTINUED | OUTPATIENT
Start: 2018-07-05 | End: 2018-07-05

## 2018-07-05 RX ORDER — SODIUM CHLORIDE 9 MG/ML
1000 INJECTION INTRAMUSCULAR; INTRAVENOUS; SUBCUTANEOUS
Qty: 0 | Refills: 0 | Status: DISCONTINUED | OUTPATIENT
Start: 2018-07-05 | End: 2018-07-06

## 2018-07-05 RX ORDER — OXYCODONE HYDROCHLORIDE 5 MG/1
10 TABLET ORAL EVERY 12 HOURS
Qty: 0 | Refills: 0 | Status: DISCONTINUED | OUTPATIENT
Start: 2018-07-05 | End: 2018-07-11

## 2018-07-05 RX ORDER — DEXAMETHASONE 0.5 MG/5ML
10 ELIXIR ORAL ONCE
Qty: 0 | Refills: 0 | Status: COMPLETED | OUTPATIENT
Start: 2018-07-05 | End: 2018-07-05

## 2018-07-05 RX ORDER — SODIUM CHLORIDE 9 MG/ML
500 INJECTION INTRAMUSCULAR; INTRAVENOUS; SUBCUTANEOUS ONCE
Qty: 0 | Refills: 0 | Status: COMPLETED | OUTPATIENT
Start: 2018-07-05 | End: 2018-07-05

## 2018-07-05 RX ORDER — INSULIN LISPRO 100/ML
4 VIAL (ML) SUBCUTANEOUS
Qty: 0 | Refills: 0 | Status: DISCONTINUED | OUTPATIENT
Start: 2018-07-05 | End: 2018-07-05

## 2018-07-05 RX ORDER — INSULIN LISPRO 100/ML
4 VIAL (ML) SUBCUTANEOUS
Qty: 0 | Refills: 0 | Status: DISCONTINUED | OUTPATIENT
Start: 2018-07-05 | End: 2018-07-11

## 2018-07-05 RX ORDER — DEXAMETHASONE 0.5 MG/5ML
4 ELIXIR ORAL EVERY 6 HOURS
Qty: 0 | Refills: 0 | Status: DISCONTINUED | OUTPATIENT
Start: 2018-07-05 | End: 2018-07-07

## 2018-07-05 RX ADMIN — ENOXAPARIN SODIUM 40 MILLIGRAM(S): 100 INJECTION SUBCUTANEOUS at 21:23

## 2018-07-05 RX ADMIN — OXYCODONE AND ACETAMINOPHEN 1 TABLET(S): 5; 325 TABLET ORAL at 17:03

## 2018-07-05 RX ADMIN — ATORVASTATIN CALCIUM 80 MILLIGRAM(S): 80 TABLET, FILM COATED ORAL at 21:23

## 2018-07-05 RX ADMIN — OXYCODONE AND ACETAMINOPHEN 1 TABLET(S): 5; 325 TABLET ORAL at 18:00

## 2018-07-05 RX ADMIN — OXYCODONE HYDROCHLORIDE 10 MILLIGRAM(S): 5 TABLET ORAL at 21:23

## 2018-07-05 RX ADMIN — OXYCODONE HYDROCHLORIDE 10 MILLIGRAM(S): 5 TABLET ORAL at 22:23

## 2018-07-05 RX ADMIN — SODIUM CHLORIDE 75 MILLILITER(S): 9 INJECTION INTRAMUSCULAR; INTRAVENOUS; SUBCUTANEOUS at 12:40

## 2018-07-05 RX ADMIN — Medication 6: at 21:24

## 2018-07-05 RX ADMIN — Medication 100 MILLIGRAM(S): at 21:23

## 2018-07-05 RX ADMIN — SODIUM CHLORIDE 50 MILLILITER(S): 9 INJECTION INTRAMUSCULAR; INTRAVENOUS; SUBCUTANEOUS at 09:31

## 2018-07-05 RX ADMIN — Medication 4 MILLIGRAM(S): at 18:54

## 2018-07-05 RX ADMIN — Medication 100 MILLIGRAM(S): at 13:01

## 2018-07-05 RX ADMIN — Medication 10 MILLIGRAM(S): at 13:00

## 2018-07-05 RX ADMIN — OXYCODONE AND ACETAMINOPHEN 1 TABLET(S): 5; 325 TABLET ORAL at 22:23

## 2018-07-05 RX ADMIN — OXYCODONE AND ACETAMINOPHEN 1 TABLET(S): 5; 325 TABLET ORAL at 23:23

## 2018-07-05 RX ADMIN — PANTOPRAZOLE SODIUM 40 MILLIGRAM(S): 20 TABLET, DELAYED RELEASE ORAL at 21:23

## 2018-07-05 RX ADMIN — Medication 25 MILLIGRAM(S): at 18:54

## 2018-07-05 RX ADMIN — OXYCODONE AND ACETAMINOPHEN 2 TABLET(S): 5; 325 TABLET ORAL at 06:42

## 2018-07-05 RX ADMIN — SODIUM CHLORIDE 1000 MILLILITER(S): 9 INJECTION INTRAMUSCULAR; INTRAVENOUS; SUBCUTANEOUS at 12:09

## 2018-07-05 RX ADMIN — INSULIN GLARGINE 10 UNIT(S): 100 INJECTION, SOLUTION SUBCUTANEOUS at 21:23

## 2018-07-05 RX ADMIN — SENNA PLUS 2 TABLET(S): 8.6 TABLET ORAL at 21:23

## 2018-07-05 RX ADMIN — OXYCODONE AND ACETAMINOPHEN 2 TABLET(S): 5; 325 TABLET ORAL at 00:30

## 2018-07-05 RX ADMIN — OXYCODONE AND ACETAMINOPHEN 2 TABLET(S): 5; 325 TABLET ORAL at 07:38

## 2018-07-05 RX ADMIN — Medication 25 MILLIGRAM(S): at 06:37

## 2018-07-05 NOTE — PROGRESS NOTE ADULT - SUBJECTIVE AND OBJECTIVE BOX
HPI:  71 y/o male pmhx HTN, HLD, spinal stenosis s/p lumbar laminectomy last year presents today for elective T10-pelvis fusin today. Patient reports chronic LBP and ambulates with cane assistance. He denies any falls but reports imbalance with ambulating. He denies any leg pain, weakness, numbness or tingling. (29 Jun 2018 07:19)    OVERNIGHT EVENTS:  Vital Signs Last 24 Hrs  T(C): 37.6 (05 Jul 2018 07:05), Max: 37.6 (05 Jul 2018 07:05)  T(F): 99.7 (05 Jul 2018 07:05), Max: 99.7 (05 Jul 2018 07:05)  HR: 77 (05 Jul 2018 05:06) (77 - 97)  BP: 139/63 (05 Jul 2018 05:06) (111/66 - 140/63)  BP(mean): --  RR: 17 (05 Jul 2018 05:06) (15 - 17)  SpO2: 97% (05 Jul 2018 05:06) (95% - 97%)    I&O's Summary    04 Jul 2018 07:01  -  05 Jul 2018 07:00  --------------------------------------------------------  IN: 1090 mL / OUT: 2075 mL / NET: -985 mL        PHYSICAL EXAM:  Neurological:  PHYSICAL EXAM:  Neurological: AA+Ox3, opens eyes spontaneously, FC  CN II-XII: grossly intact, PERRL, EOMI  Motor exam: MAEx4, RLE 4-/5, otherwise 5/5 strength throughout    Cardiovascular: regular rate and rhythm  Respiratory: clear to auscultation Respiratios regular and unlabored  Gastrointestinal: soft, nontender, nondistended +BS  Incision/Wound: C/D/I with KARON and hemovac as per Plastic Surgery managment      DIET: Regular      LABS:                        8.8    14.4  )-----------( 257      ( 05 Jul 2018 06:38 )             27.5     07-05    136  |  99  |  19  ----------------------------<  104<H>  4.0   |  27  |  0.91    Ca    8.8      05 Jul 2018 06:38  Phos  2.9     07-04  Mg     1.8     07-04    TPro  5.7<L>  /  Alb  3.3  /  TBili  0.5  /  DBili  x   /  AST  23  /  ALT  25  /  AlkPhos  39<L>  07-05            CAPILLARY BLOOD GLUCOSE      POCT Blood Glucose.: 112 mg/dL (05 Jul 2018 05:42)  POCT Blood Glucose.: 184 mg/dL (04 Jul 2018 21:13)  POCT Blood Glucose.: 111 mg/dL (04 Jul 2018 16:59)  POCT Blood Glucose.: 114 mg/dL (04 Jul 2018 11:57)      Drug Levels: [] N/A    CSF Analysis: [] N/A      Allergies    No Known Allergies    Intolerances      MEDICATIONS:  Antibiotics:    Neuro:  acetaminophen   Tablet 650 milliGRAM(s) Oral every 6 hours PRN  acetaminophen   Tablet. 650 milliGRAM(s) Oral every 6 hours PRN  HYDROmorphone  Injectable 1 milliGRAM(s) IV Push every 3 hours PRN  ondansetron Injectable 4 milliGRAM(s) IV Push every 6 hours PRN  oxyCODONE    5 mG/acetaminophen 325 mG 1 Tablet(s) Oral every 4 hours PRN  oxyCODONE    5 mG/acetaminophen 325 mG 2 Tablet(s) Oral every 6 hours PRN    Anticoagulation:  enoxaparin Injectable 40 milliGRAM(s) SubCutaneous at bedtime    OTHER:  atorvastatin 80 milliGRAM(s) Oral at bedtime  BUpivacaine liposome 1.3% Injectable (no eMAR) 20 milliLiter(s) Local Injection once  dextrose 40% Gel 15 Gram(s) Oral once PRN  dextrose 50% Injectable 12.5 Gram(s) IV Push once  dextrose 50% Injectable 25 Gram(s) IV Push once  dextrose 50% Injectable 25 Gram(s) IV Push once  docusate sodium 100 milliGRAM(s) Oral three times a day  glucagon  Injectable 1 milliGRAM(s) IntraMuscular once PRN  insulin glargine Injectable (LANTUS) 10 Unit(s) SubCutaneous at bedtime  insulin lispro (HumaLOG) corrective regimen sliding scale   SubCutaneous Before meals and at bedtime  metoprolol tartrate 25 milliGRAM(s) Oral every 12 hours  pantoprazole    Tablet 40 milliGRAM(s) Oral before breakfast  senna 2 Tablet(s) Oral at bedtime    IVF:  dextrose 5%. 1000 milliLiter(s) IV Continuous <Continuous>  sodium chloride 0.9%. 1000 milliLiter(s) IV Continuous <Continuous>      ASSESSMENT:  72y Male s/p  T10 to pelvis instrumentation and fusion w/ plastics closure	      PLAN:    NEURO:    Monitor neuro status  OT/PT  Pain Management  Bowel regime      CARDIOVASCULAR:  Monitor HR  Continue telemetry  F/u Cardiolgy  Start IVF  pt is pale and clammy    PULMONARY:  Incentive Spirometry  Encourage CDI      GI:  Bowel regime  Monitor I/OI    HEME:  F/U H/H  F/U Na    ID:  Monitor VS    Dispo: Will discuss witha tending

## 2018-07-05 NOTE — PROGRESS NOTE ADULT - SUBJECTIVE AND OBJECTIVE BOX
Interval Events: Reviewed  Patient seen and examined at bedside.    Patient is a 72y old  Male who presents with a chief complaint of elective spine surgery (29 Jun 2018 07:19)  Is complaining of soreness in the right side of the chest. Is eating okay and not short of breath    PAST MEDICAL & SURGICAL HISTORY:  PAD (peripheral artery disease)  Hypercholesterolemia  Hypertension  H/O laminectomy  History of hernia repair  S/P hip replacement: right hip  S/P knee replacement, bilateral      MEDICATIONS:  Pulmonary:    Antimicrobials:    Anticoagulants:  enoxaparin Injectable 40 milliGRAM(s) SubCutaneous at bedtime    Cardiac:  metoprolol tartrate 25 milliGRAM(s) Oral every 12 hours      Allergies    No Known Allergies    Intolerances        Vital Signs Last 24 Hrs  T(C): 37.1 (05 Jul 2018 20:34), Max: 37.6 (05 Jul 2018 07:05)  T(F): 98.7 (05 Jul 2018 20:34), Max: 99.7 (05 Jul 2018 07:05)  HR: 91 (05 Jul 2018 20:34) (74 - 94)  BP: 117/56 (05 Jul 2018 20:34) (116/55 - 153/65)  BP(mean): --  RR: 16 (05 Jul 2018 20:34) (15 - 17)  SpO2: 94% (05 Jul 2018 20:34) (92% - 97%)    07-04 @ 07:01  -  07-05 @ 07:00  --------------------------------------------------------  IN: 1090 mL / OUT: 2075 mL / NET: -985 mL    07-05 @ 07:01  -  07-05 @ 22:19  --------------------------------------------------------  IN: 1415 mL / OUT: 1450 mL / NET: -35 mL          LABS:      CBC Full  -  ( 05 Jul 2018 15:36 )  WBC Count : 13.7 K/uL  Hemoglobin : 8.9 g/dL  Hematocrit : 27.7 %  Platelet Count - Automated : 262 K/uL  Mean Cell Volume : 94.5 fL  Mean Cell Hemoglobin : 30.4 pg  Mean Cell Hemoglobin Concentration : 32.1 g/dL  Auto Neutrophil # : x  Auto Lymphocyte # : x  Auto Monocyte # : x  Auto Eosinophil # : x  Auto Basophil # : x  Auto Neutrophil % : x  Auto Lymphocyte % : x  Auto Monocyte % : x  Auto Eosinophil % : x  Auto Basophil % : x    07-05    136  |  99  |  19  ----------------------------<  104<H>  4.0   |  27  |  0.91    Ca    8.8      05 Jul 2018 06:38  Phos  2.9     07-04  Mg     1.8     07-04    TPro  5.7<L>  /  Alb  3.3  /  TBili  0.5  /  DBili  x   /  AST  23  /  ALT  25  /  AlkPhos  39<L>  07-05          Urinalysis Basic - ( 05 Jul 2018 16:28 )    Color: Yellow / Appearance: SL Cloudy / SG: <=1.005 / pH: x  Gluc: x / Ketone: NEGATIVE  / Bili: Negative / Urobili: 0.2 E.U./dL   Blood: x / Protein: NEGATIVE mg/dL / Nitrite: NEGATIVE   Leuk Esterase: NEGATIVE / RBC: Many /HPF / WBC < 5 /HPF   Sq Epi: x / Non Sq Epi: 0-5 /HPF / Bacteria: Present /HPF                  RADIOLOGY & ADDITIONAL STUDIES (The following images were personally reviewed):  Ivory:                                     No  Urine output:                       adequate  DVT prophylaxis:                 Yes  Flattus:                                  Yes  Bowel movement:            yes

## 2018-07-06 DIAGNOSIS — R07.1 CHEST PAIN ON BREATHING: ICD-10-CM

## 2018-07-06 LAB
ANION GAP SERPL CALC-SCNC: 13 MMOL/L — SIGNIFICANT CHANGE UP (ref 5–17)
BUN SERPL-MCNC: 24 MG/DL — HIGH (ref 7–23)
CALCIUM SERPL-MCNC: 8.6 MG/DL — SIGNIFICANT CHANGE UP (ref 8.4–10.5)
CHLORIDE SERPL-SCNC: 101 MMOL/L — SIGNIFICANT CHANGE UP (ref 96–108)
CO2 SERPL-SCNC: 25 MMOL/L — SIGNIFICANT CHANGE UP (ref 22–31)
CREAT SERPL-MCNC: 0.95 MG/DL — SIGNIFICANT CHANGE UP (ref 0.5–1.3)
GLUCOSE BLDC GLUCOMTR-MCNC: 129 MG/DL — HIGH (ref 70–99)
GLUCOSE BLDC GLUCOMTR-MCNC: 150 MG/DL — HIGH (ref 70–99)
GLUCOSE BLDC GLUCOMTR-MCNC: 161 MG/DL — HIGH (ref 70–99)
GLUCOSE BLDC GLUCOMTR-MCNC: 163 MG/DL — HIGH (ref 70–99)
GLUCOSE SERPL-MCNC: 159 MG/DL — HIGH (ref 70–99)
HCT VFR BLD CALC: 28.9 % — LOW (ref 39–50)
HGB BLD-MCNC: 9.3 G/DL — LOW (ref 13–17)
MAGNESIUM SERPL-MCNC: 2 MG/DL — SIGNIFICANT CHANGE UP (ref 1.6–2.6)
MCHC RBC-ENTMCNC: 30 PG — SIGNIFICANT CHANGE UP (ref 27–34)
MCHC RBC-ENTMCNC: 32.2 G/DL — SIGNIFICANT CHANGE UP (ref 32–36)
MCV RBC AUTO: 93.2 FL — SIGNIFICANT CHANGE UP (ref 80–100)
PHOSPHATE SERPL-MCNC: 3.6 MG/DL — SIGNIFICANT CHANGE UP (ref 2.5–4.5)
PLATELET # BLD AUTO: 287 K/UL — SIGNIFICANT CHANGE UP (ref 150–400)
POTASSIUM SERPL-MCNC: 4.8 MMOL/L — SIGNIFICANT CHANGE UP (ref 3.5–5.3)
POTASSIUM SERPL-SCNC: 4.8 MMOL/L — SIGNIFICANT CHANGE UP (ref 3.5–5.3)
RBC # BLD: 3.1 M/UL — LOW (ref 4.2–5.8)
RBC # FLD: 14.8 % — SIGNIFICANT CHANGE UP (ref 10.3–16.9)
SODIUM SERPL-SCNC: 139 MMOL/L — SIGNIFICANT CHANGE UP (ref 135–145)
WBC # BLD: 13.8 K/UL — HIGH (ref 3.8–10.5)
WBC # FLD AUTO: 13.8 K/UL — HIGH (ref 3.8–10.5)

## 2018-07-06 PROCEDURE — 99232 SBSQ HOSP IP/OBS MODERATE 35: CPT | Mod: GC

## 2018-07-06 PROCEDURE — 71045 X-RAY EXAM CHEST 1 VIEW: CPT | Mod: 26

## 2018-07-06 RX ORDER — OXYCODONE AND ACETAMINOPHEN 5; 325 MG/1; MG/1
2 TABLET ORAL EVERY 6 HOURS
Qty: 0 | Refills: 0 | Status: DISCONTINUED | OUTPATIENT
Start: 2018-07-06 | End: 2018-07-11

## 2018-07-06 RX ORDER — POLYETHYLENE GLYCOL 3350 17 G/17G
17 POWDER, FOR SOLUTION ORAL
Qty: 0 | Refills: 0 | Status: DISCONTINUED | OUTPATIENT
Start: 2018-07-06 | End: 2018-07-11

## 2018-07-06 RX ORDER — OXYCODONE AND ACETAMINOPHEN 5; 325 MG/1; MG/1
1 TABLET ORAL EVERY 4 HOURS
Qty: 0 | Refills: 0 | Status: DISCONTINUED | OUTPATIENT
Start: 2018-07-06 | End: 2018-07-11

## 2018-07-06 RX ADMIN — OXYCODONE HYDROCHLORIDE 10 MILLIGRAM(S): 5 TABLET ORAL at 21:25

## 2018-07-06 RX ADMIN — ENOXAPARIN SODIUM 40 MILLIGRAM(S): 100 INJECTION SUBCUTANEOUS at 21:25

## 2018-07-06 RX ADMIN — SODIUM CHLORIDE 75 MILLILITER(S): 9 INJECTION INTRAMUSCULAR; INTRAVENOUS; SUBCUTANEOUS at 09:14

## 2018-07-06 RX ADMIN — Medication 4 MILLIGRAM(S): at 01:07

## 2018-07-06 RX ADMIN — OXYCODONE HYDROCHLORIDE 10 MILLIGRAM(S): 5 TABLET ORAL at 09:14

## 2018-07-06 RX ADMIN — Medication 100 MILLIGRAM(S): at 14:45

## 2018-07-06 RX ADMIN — Medication 2: at 12:30

## 2018-07-06 RX ADMIN — INSULIN GLARGINE 10 UNIT(S): 100 INJECTION, SOLUTION SUBCUTANEOUS at 23:07

## 2018-07-06 RX ADMIN — Medication 4 UNIT(S): at 12:30

## 2018-07-06 RX ADMIN — Medication 4 UNIT(S): at 08:03

## 2018-07-06 RX ADMIN — POLYETHYLENE GLYCOL 3350 17 GRAM(S): 17 POWDER, FOR SOLUTION ORAL at 17:21

## 2018-07-06 RX ADMIN — PANTOPRAZOLE SODIUM 40 MILLIGRAM(S): 20 TABLET, DELAYED RELEASE ORAL at 21:25

## 2018-07-06 RX ADMIN — Medication 10 MILLIGRAM(S): at 18:08

## 2018-07-06 RX ADMIN — Medication 4 MILLIGRAM(S): at 12:29

## 2018-07-06 RX ADMIN — Medication 4 UNIT(S): at 17:22

## 2018-07-06 RX ADMIN — Medication 100 MILLIGRAM(S): at 06:43

## 2018-07-06 RX ADMIN — OXYCODONE HYDROCHLORIDE 10 MILLIGRAM(S): 5 TABLET ORAL at 22:25

## 2018-07-06 RX ADMIN — Medication 4 MILLIGRAM(S): at 06:43

## 2018-07-06 RX ADMIN — Medication 1 ENEMA: at 19:39

## 2018-07-06 RX ADMIN — SENNA PLUS 2 TABLET(S): 8.6 TABLET ORAL at 21:25

## 2018-07-06 RX ADMIN — Medication 100 MILLIGRAM(S): at 21:25

## 2018-07-06 RX ADMIN — Medication 2: at 17:22

## 2018-07-06 RX ADMIN — ATORVASTATIN CALCIUM 80 MILLIGRAM(S): 80 TABLET, FILM COATED ORAL at 21:25

## 2018-07-06 RX ADMIN — Medication 25 MILLIGRAM(S): at 06:43

## 2018-07-06 RX ADMIN — Medication 4 MILLIGRAM(S): at 19:39

## 2018-07-06 RX ADMIN — Medication 25 MILLIGRAM(S): at 17:21

## 2018-07-06 RX ADMIN — OXYCODONE HYDROCHLORIDE 10 MILLIGRAM(S): 5 TABLET ORAL at 10:00

## 2018-07-06 NOTE — PROGRESS NOTE ADULT - SUBJECTIVE AND OBJECTIVE BOX
HPI:  73 y/o male pmhx HTN, HLD, spinal stenosis s/p lumbar laminectomy last year presents today for elective T10-pelvis fusin today. Patient reports chronic LBP and ambulates with cane assistance. He denies any falls but reports imbalance with ambulating. He denies any leg pain, weakness, numbness or tingling. (29 Jun 2018 07:19)    OVERNIGHT EVENTS: POD#7 s/p T10-pelvis posterior fusion. Pt received 1 unit pRBCs yesterday for symptomatic anemia. He feels much better today - reports more energy, improved appetite, no dizziness or weakness. He was also given 10mg decadron yesterday and started on 4q6 standing. He is prepared to work with PT/OT today and plan for discharge. Afebrile overnight.     Vital Signs Last 24 Hrs  T(C): 36.4 (06 Jul 2018 08:46), Max: 37.1 (05 Jul 2018 14:27)  T(F): 97.5 (06 Jul 2018 08:46), Max: 98.7 (05 Jul 2018 14:27)  HR: 77 (06 Jul 2018 08:46) (74 - 97)  BP: 147/65 (06 Jul 2018 08:46) (102/60 - 153/65)  BP(mean): --  RR: 15 (06 Jul 2018 08:46) (15 - 17)  SpO2: 96% (06 Jul 2018 08:46) (92% - 96%)    I&O's Summary    05 Jul 2018 07:01  -  06 Jul 2018 07:00  --------------------------------------------------------  IN: 2330 mL / OUT: 1940 mL / NET: 390 mL    06 Jul 2018 07:01  -  06 Jul 2018 09:57  --------------------------------------------------------  IN: 225 mL / OUT: 0 mL / NET: 225 mL        PHYSICAL EXAM:  Gen: skin pink/warm/dry - paleness much improved s/p 1u pRBCs  Neurological: AOx3, NAD, FC, speech coherent   CN II-XII: EOMI, PERRL, face symmetric, tongue midline   Motor: MAEx4 4+/5 UE and LE B/L   SILT throughout  WWP throughout   No pronator drift   Scalp incision site: dressing C/D/I, no erythema, edema or purulent drainage, non-tender to palpation    Cardiovascular: regular rate   Respiratory: unlabored breathing on RA   Gastrointestinal: abd soft, NT, ND   Genitourinary: no tavarez in place   Extremities: no LE edema     TUBES/LINES:  [] Tavarez  [] Lumbar Drain  [x] Wound Drains - JPx1 and HMV x1 each with 20cc output last night - managed by PRS  [] Others       DIET:  [] NPO  [x] Mechanical  [] Tube feeds    LABS:                        9.3  (8.8 pre-transfusion)  13.8  )-----------( 287      ( 06 Jul 2018 06:54 )             28.9 (27.5 pre-transfusion)    07-06    139  |  101  |  24<H>  ----------------------------<  159<H>  4.8   |  25  |  0.95    Ca    8.6      06 Jul 2018 06:54  Phos  3.6     07-06  Mg     2.0     07-06    TPro  5.7<L>  /  Alb  3.3  /  TBili  0.5  /  DBili  x   /  AST  23  /  ALT  25  /  AlkPhos  39<L>  07-05      Urinalysis Basic - ( 05 Jul 2018 16:28 )    Color: Yellow / Appearance: SL Cloudy / SG: <=1.005 / pH: x  Gluc: x / Ketone: NEGATIVE  / Bili: Negative / Urobili: 0.2 E.U./dL   Blood: x / Protein: NEGATIVE mg/dL / Nitrite: NEGATIVE   Leuk Esterase: NEGATIVE / RBC: Many /HPF / WBC < 5 /HPF   Sq Epi: x / Non Sq Epi: 0-5 /HPF / Bacteria: Present /HPF          CAPILLARY BLOOD GLUCOSE      POCT Blood Glucose.: 150 mg/dL (06 Jul 2018 07:15)  POCT Blood Glucose.: 251 mg/dL (05 Jul 2018 21:01)  POCT Blood Glucose.: 222 mg/dL (05 Jul 2018 17:58)  POCT Blood Glucose.: 127 mg/dL (05 Jul 2018 17:55)  POCT Blood Glucose.: 113 mg/dL (05 Jul 2018 11:53)      Drug Levels: [] N/A    CSF Analysis: [] N/A      Allergies    No Known Allergies    Intolerances        Home Medications:  Aspir 81 oral delayed release tablet: 1 tab(s) orally once a day (29 Jun 2018 06:26)  Crestor 20 mg oral tablet: 1 tab(s) orally once a day (at bedtime) (29 Jun 2018 06:26)  losartan 25 mg oral tablet:  orally  (29 Jun 2018 06:26)      MEDICATIONS:  Antibiotics:    Neuro:  acetaminophen   Tablet 650 milliGRAM(s) Oral every 6 hours PRN  acetaminophen   Tablet. 650 milliGRAM(s) Oral every 6 hours PRN  HYDROmorphone  Injectable 1 milliGRAM(s) IV Push every 3 hours PRN  ondansetron Injectable 4 milliGRAM(s) IV Push every 6 hours PRN  oxyCODONE    5 mG/acetaminophen 325 mG 1 Tablet(s) Oral every 4 hours PRN  oxyCODONE    5 mG/acetaminophen 325 mG 2 Tablet(s) Oral every 6 hours PRN  oxyCODONE  ER Tablet 10 milliGRAM(s) Oral every 12 hours    Anticoagulation:  enoxaparin Injectable 40 milliGRAM(s) SubCutaneous at bedtime    OTHER:  atorvastatin 80 milliGRAM(s) Oral at bedtime  bisacodyl Suppository 10 milliGRAM(s) Rectal daily PRN  BUpivacaine liposome 1.3% Injectable (no eMAR) 20 milliLiter(s) Local Injection once  dexamethasone     Tablet 4 milliGRAM(s) Oral every 6 hours  dextrose 40% Gel 15 Gram(s) Oral once PRN  dextrose 50% Injectable 12.5 Gram(s) IV Push once  dextrose 50% Injectable 25 Gram(s) IV Push once  dextrose 50% Injectable 25 Gram(s) IV Push once  docusate sodium 100 milliGRAM(s) Oral three times a day  glucagon  Injectable 1 milliGRAM(s) IntraMuscular once PRN  insulin glargine Injectable (LANTUS) 10 Unit(s) SubCutaneous at bedtime  insulin lispro (HumaLOG) corrective regimen sliding scale   SubCutaneous Before meals and at bedtime  insulin lispro Injectable (HumaLOG) 4 Unit(s) SubCutaneous three times a day before meals  metoprolol tartrate 25 milliGRAM(s) Oral every 12 hours  pantoprazole    Tablet 40 milliGRAM(s) Oral before breakfast  polyethylene glycol 3350 17 Gram(s) Oral two times a day  senna 2 Tablet(s) Oral at bedtime    IVF:  dextrose 5%. 1000 milliLiter(s) IV Continuous <Continuous>  sodium chloride 0.9%. 1000 milliLiter(s) IV Continuous <Continuous>    CULTURES:    RADIOLOGY & ADDITIONAL TESTS:      ASSESSMENT:  72y Male POD #7 s/p T10-pelvis posterior fusion with plastics closure    M43.8X9  No pertinent family history in first degree relatives  Family history of coronary artery disease  Handoff  MEWS Score  PAD (peripheral artery disease)  Hypercholesterolemia  Hypertension  Sagittal plane imbalance  Sagittal plane imbalance  Costochondral chest pain  Ventricular tachycardia  DM I (diabetes mellitus, type I), uncontrolled  Hypertension  Hypercholesterolemia  PAD (peripheral artery disease)  Spinal fusion  H/O laminectomy  History of hernia repair  S/P hip replacement  S/P knee replacement, bilateral      PLAN:  NEURO:  - Q4 neuro checks   - Cont. decadron 4q6, no taper for now per Dr. Young  - Dr. Chavarria following for pain: oxycontin 10 q12, dilaudid prn, perc prn   - PT/OT   - PRS re: removal of drains    CARDIOVASCULAR:  - BP goal: normotension  - Cont home meds: metoprolol, atorvastatin     PULMONARY:  - IS     RENAL:  - I&Os     GI:  - Bowel regimen   - D/c IVF once eating sufficiently   - Reg diet   - Cont. protonix while on steroids     HEME:  - Trend H/H     ID:  - Monitor for fevers     ENDO:  - ISS   - Lispro 4 units TID   - Glargine 10 QHS     DVT PROPHYLAXIS:  [x] Venodynes                                [x] Lovenox    DISPOSITION:  - Floor status   - Full code   - Dispo: GAIL once drains out   - D/w Dr. Young and Dr. Moreno HPI:  73 y/o male pmhx HTN, HLD, spinal stenosis s/p lumbar laminectomy last year presents today for elective T10-pelvis fusin today. Patient reports chronic LBP and ambulates with cane assistance. He denies any falls but reports imbalance with ambulating. He denies any leg pain, weakness, numbness or tingling. (29 Jun 2018 07:19)    OVERNIGHT EVENTS: POD#7 s/p T10-pelvis posterior fusion. Pt received 1 unit pRBCs yesterday for symptomatic anemia. He feels much better today - reports more energy, improved appetite, no dizziness or weakness. He was also given 10mg decadron yesterday and started on 4q6 standing. He is prepared to work with PT/OT today and plan for discharge. Afebrile overnight.     Vital Signs Last 24 Hrs  T(C): 36.4 (06 Jul 2018 08:46), Max: 37.1 (05 Jul 2018 14:27)  T(F): 97.5 (06 Jul 2018 08:46), Max: 98.7 (05 Jul 2018 14:27)  HR: 77 (06 Jul 2018 08:46) (74 - 97)  BP: 147/65 (06 Jul 2018 08:46) (102/60 - 153/65)  BP(mean): --  RR: 15 (06 Jul 2018 08:46) (15 - 17)  SpO2: 96% (06 Jul 2018 08:46) (92% - 96%)    I&O's Summary    05 Jul 2018 07:01  -  06 Jul 2018 07:00  --------------------------------------------------------  IN: 2330 mL / OUT: 1940 mL / NET: 390 mL    06 Jul 2018 07:01  -  06 Jul 2018 09:57  --------------------------------------------------------  IN: 225 mL / OUT: 0 mL / NET: 225 mL        PHYSICAL EXAM:  Gen: skin pink/warm/dry - paleness much improved s/p 1u pRBCs  Neurological: AOx3, NAD, FC, speech coherent   CN II-XII: EOMI, PERRL, face symmetric, tongue midline   Motor: MAEx4 4+/5 UE and LE B/L   SILT throughout  WWP throughout   No pronator drift   Scalp incision site: dressing C/D/I, no erythema, edema or purulent drainage, non-tender to palpation    Cardiovascular: regular rate   Respiratory: unlabored breathing on RA   Gastrointestinal: abd soft, NT, ND   Genitourinary: no tavarez in place   Extremities: no LE edema     TUBES/LINES:  [] Tavarez  [] Lumbar Drain  [x] Wound Drains - JPx1 and HMV x1 each with 20cc output last night - managed by PRS  [] Others       DIET:  [] NPO  [x] Mechanical  [] Tube feeds    LABS:                        9.3  (8.8 pre-transfusion)  13.8  )-----------( 287      ( 06 Jul 2018 06:54 )             28.9 (27.5 pre-transfusion)    07-06    139  |  101  |  24<H>  ----------------------------<  159<H>  4.8   |  25  |  0.95    Ca    8.6      06 Jul 2018 06:54  Phos  3.6     07-06  Mg     2.0     07-06    TPro  5.7<L>  /  Alb  3.3  /  TBili  0.5  /  DBili  x   /  AST  23  /  ALT  25  /  AlkPhos  39<L>  07-05      Urinalysis Basic - ( 05 Jul 2018 16:28 )    Color: Yellow / Appearance: SL Cloudy / SG: <=1.005 / pH: x  Gluc: x / Ketone: NEGATIVE  / Bili: Negative / Urobili: 0.2 E.U./dL   Blood: x / Protein: NEGATIVE mg/dL / Nitrite: NEGATIVE   Leuk Esterase: NEGATIVE / RBC: Many /HPF / WBC < 5 /HPF   Sq Epi: x / Non Sq Epi: 0-5 /HPF / Bacteria: Present /HPF          CAPILLARY BLOOD GLUCOSE      POCT Blood Glucose.: 150 mg/dL (06 Jul 2018 07:15)  POCT Blood Glucose.: 251 mg/dL (05 Jul 2018 21:01)  POCT Blood Glucose.: 222 mg/dL (05 Jul 2018 17:58)  POCT Blood Glucose.: 127 mg/dL (05 Jul 2018 17:55)  POCT Blood Glucose.: 113 mg/dL (05 Jul 2018 11:53)      Drug Levels: [] N/A    CSF Analysis: [] N/A      Allergies    No Known Allergies    Intolerances        Home Medications:  Aspir 81 oral delayed release tablet: 1 tab(s) orally once a day (29 Jun 2018 06:26)  Crestor 20 mg oral tablet: 1 tab(s) orally once a day (at bedtime) (29 Jun 2018 06:26)  losartan 25 mg oral tablet:  orally  (29 Jun 2018 06:26)      MEDICATIONS:  Antibiotics:    Neuro:  acetaminophen   Tablet 650 milliGRAM(s) Oral every 6 hours PRN  acetaminophen   Tablet. 650 milliGRAM(s) Oral every 6 hours PRN  HYDROmorphone  Injectable 1 milliGRAM(s) IV Push every 3 hours PRN  ondansetron Injectable 4 milliGRAM(s) IV Push every 6 hours PRN  oxyCODONE    5 mG/acetaminophen 325 mG 1 Tablet(s) Oral every 4 hours PRN  oxyCODONE    5 mG/acetaminophen 325 mG 2 Tablet(s) Oral every 6 hours PRN  oxyCODONE  ER Tablet 10 milliGRAM(s) Oral every 12 hours    Anticoagulation:  enoxaparin Injectable 40 milliGRAM(s) SubCutaneous at bedtime    OTHER:  atorvastatin 80 milliGRAM(s) Oral at bedtime  bisacodyl Suppository 10 milliGRAM(s) Rectal daily PRN  BUpivacaine liposome 1.3% Injectable (no eMAR) 20 milliLiter(s) Local Injection once  dexamethasone     Tablet 4 milliGRAM(s) Oral every 6 hours  dextrose 40% Gel 15 Gram(s) Oral once PRN  dextrose 50% Injectable 12.5 Gram(s) IV Push once  dextrose 50% Injectable 25 Gram(s) IV Push once  dextrose 50% Injectable 25 Gram(s) IV Push once  docusate sodium 100 milliGRAM(s) Oral three times a day  glucagon  Injectable 1 milliGRAM(s) IntraMuscular once PRN  insulin glargine Injectable (LANTUS) 10 Unit(s) SubCutaneous at bedtime  insulin lispro (HumaLOG) corrective regimen sliding scale   SubCutaneous Before meals and at bedtime  insulin lispro Injectable (HumaLOG) 4 Unit(s) SubCutaneous three times a day before meals  metoprolol tartrate 25 milliGRAM(s) Oral every 12 hours  pantoprazole    Tablet 40 milliGRAM(s) Oral before breakfast  polyethylene glycol 3350 17 Gram(s) Oral two times a day  senna 2 Tablet(s) Oral at bedtime    IVF:  dextrose 5%. 1000 milliLiter(s) IV Continuous <Continuous>  sodium chloride 0.9%. 1000 milliLiter(s) IV Continuous <Continuous>    CULTURES:    RADIOLOGY & ADDITIONAL TESTS:      ASSESSMENT:  72y Male POD #7 s/p T10-pelvis posterior fusion with plastics closure    M43.8X9  No pertinent family history in first degree relatives  Family history of coronary artery disease  Handoff  MEWS Score  PAD (peripheral artery disease)  Hypercholesterolemia  Hypertension  Sagittal plane imbalance  Sagittal plane imbalance  Costochondral chest pain  Ventricular tachycardia  DM I (diabetes mellitus, type I), uncontrolled  Hypertension  Hypercholesterolemia  PAD (peripheral artery disease)  Spinal fusion  H/O laminectomy  History of hernia repair  S/P hip replacement  S/P knee replacement, bilateral      PLAN:  NEURO:  - Q4 neuro checks   - Cont. decadron 4q6, taper by cutting dose in half q2 days per Dr. Young, next dose: 2q6 on 7/7  - Dr. Chavarria following for pain: oxycontin 10 q12, dilaudid prn, perc prn   - PT/OT   - Consult PRS re: removal of drains given minimal output     CARDIOVASCULAR:  - BP goal: normotension  - Cont home meds: metoprolol, atorvastatin     PULMONARY:  - IS     RENAL:  - I&Os     GI:  - Bowel regimen   - D/c IVF once eating sufficiently   - Reg diet   - Cont. protonix while on steroids     HEME:  - Trend H/H     ID:  - Monitor for fevers     ENDO:  - ISS   - Lispro 4 units TID   - Glargine 10 QHS     DVT PROPHYLAXIS:  [x] Venodynes                                [x] Lovenox    DISPOSITION:  - Floor status   - Full code   - Dispo: GAIL once drains out   - D/w Dr. Young and Dr. Moreno HPI:  71 y/o male pmhx HTN, HLD, spinal stenosis s/p lumbar laminectomy last year presents today for elective T10-pelvis fusin today. Patient reports chronic LBP and ambulates with cane assistance. He denies any falls but reports imbalance with ambulating. He denies any leg pain, weakness, numbness or tingling. (29 Jun 2018 07:19)    OVERNIGHT EVENTS: POD#7 s/p T10-pelvis posterior fusion. Pt received 1 unit pRBCs yesterday for symptomatic anemia. He feels much better today - reports more energy, improved appetite, no dizziness or weakness. He was also given 10mg decadron yesterday and started on 4q6 standing. He is prepared to work with PT/OT today and plan for discharge. Afebrile overnight.     Vital Signs Last 24 Hrs  T(C): 36.4 (06 Jul 2018 08:46), Max: 37.1 (05 Jul 2018 14:27)  T(F): 97.5 (06 Jul 2018 08:46), Max: 98.7 (05 Jul 2018 14:27)  HR: 77 (06 Jul 2018 08:46) (74 - 97)  BP: 147/65 (06 Jul 2018 08:46) (102/60 - 153/65)  BP(mean): --  RR: 15 (06 Jul 2018 08:46) (15 - 17)  SpO2: 96% (06 Jul 2018 08:46) (92% - 96%)    I&O's Summary    05 Jul 2018 07:01  -  06 Jul 2018 07:00  --------------------------------------------------------  IN: 2330 mL / OUT: 1940 mL / NET: 390 mL    06 Jul 2018 07:01  -  06 Jul 2018 09:57  --------------------------------------------------------  IN: 225 mL / OUT: 0 mL / NET: 225 mL        PHYSICAL EXAM:  Gen: skin pink/warm/dry - paleness much improved s/p 1u pRBCs  Neurological: AOx3, NAD, FC, speech coherent   CN II-XII: EOMI, PERRL, face symmetric, tongue midline   Motor: MAEx4 4+/5 UE and LE B/L   SILT throughout  WWP throughout   No pronator drift   Incision site: dressing C/D/I, no erythema, edema or purulent drainage, non-tender to palpation    Cardiovascular: regular rate   Respiratory: unlabored breathing on RA   Gastrointestinal: abd soft, NT, ND   Genitourinary: no tavarez in place   Extremities: no LE edema     TUBES/LINES:  [] Tavarez  [] Lumbar Drain  [x] Wound Drains - JPx1 and HMV x1 each with 20cc output last night - managed by PRS  [] Others       DIET:  [] NPO  [x] Mechanical  [] Tube feeds    LABS:                        9.3  (8.8 pre-transfusion)  13.8  )-----------( 287      ( 06 Jul 2018 06:54 )             28.9 (27.5 pre-transfusion)    07-06    139  |  101  |  24<H>  ----------------------------<  159<H>  4.8   |  25  |  0.95    Ca    8.6      06 Jul 2018 06:54  Phos  3.6     07-06  Mg     2.0     07-06    TPro  5.7<L>  /  Alb  3.3  /  TBili  0.5  /  DBili  x   /  AST  23  /  ALT  25  /  AlkPhos  39<L>  07-05      Urinalysis Basic - ( 05 Jul 2018 16:28 )    Color: Yellow / Appearance: SL Cloudy / SG: <=1.005 / pH: x  Gluc: x / Ketone: NEGATIVE  / Bili: Negative / Urobili: 0.2 E.U./dL   Blood: x / Protein: NEGATIVE mg/dL / Nitrite: NEGATIVE   Leuk Esterase: NEGATIVE / RBC: Many /HPF / WBC < 5 /HPF   Sq Epi: x / Non Sq Epi: 0-5 /HPF / Bacteria: Present /HPF          CAPILLARY BLOOD GLUCOSE      POCT Blood Glucose.: 150 mg/dL (06 Jul 2018 07:15)  POCT Blood Glucose.: 251 mg/dL (05 Jul 2018 21:01)  POCT Blood Glucose.: 222 mg/dL (05 Jul 2018 17:58)  POCT Blood Glucose.: 127 mg/dL (05 Jul 2018 17:55)  POCT Blood Glucose.: 113 mg/dL (05 Jul 2018 11:53)      Drug Levels: [] N/A    CSF Analysis: [] N/A      Allergies    No Known Allergies    Intolerances        Home Medications:  Aspir 81 oral delayed release tablet: 1 tab(s) orally once a day (29 Jun 2018 06:26)  Crestor 20 mg oral tablet: 1 tab(s) orally once a day (at bedtime) (29 Jun 2018 06:26)  losartan 25 mg oral tablet:  orally  (29 Jun 2018 06:26)      MEDICATIONS:  Antibiotics:    Neuro:  acetaminophen   Tablet 650 milliGRAM(s) Oral every 6 hours PRN  acetaminophen   Tablet. 650 milliGRAM(s) Oral every 6 hours PRN  HYDROmorphone  Injectable 1 milliGRAM(s) IV Push every 3 hours PRN  ondansetron Injectable 4 milliGRAM(s) IV Push every 6 hours PRN  oxyCODONE    5 mG/acetaminophen 325 mG 1 Tablet(s) Oral every 4 hours PRN  oxyCODONE    5 mG/acetaminophen 325 mG 2 Tablet(s) Oral every 6 hours PRN  oxyCODONE  ER Tablet 10 milliGRAM(s) Oral every 12 hours    Anticoagulation:  enoxaparin Injectable 40 milliGRAM(s) SubCutaneous at bedtime    OTHER:  atorvastatin 80 milliGRAM(s) Oral at bedtime  bisacodyl Suppository 10 milliGRAM(s) Rectal daily PRN  BUpivacaine liposome 1.3% Injectable (no eMAR) 20 milliLiter(s) Local Injection once  dexamethasone     Tablet 4 milliGRAM(s) Oral every 6 hours  dextrose 40% Gel 15 Gram(s) Oral once PRN  dextrose 50% Injectable 12.5 Gram(s) IV Push once  dextrose 50% Injectable 25 Gram(s) IV Push once  dextrose 50% Injectable 25 Gram(s) IV Push once  docusate sodium 100 milliGRAM(s) Oral three times a day  glucagon  Injectable 1 milliGRAM(s) IntraMuscular once PRN  insulin glargine Injectable (LANTUS) 10 Unit(s) SubCutaneous at bedtime  insulin lispro (HumaLOG) corrective regimen sliding scale   SubCutaneous Before meals and at bedtime  insulin lispro Injectable (HumaLOG) 4 Unit(s) SubCutaneous three times a day before meals  metoprolol tartrate 25 milliGRAM(s) Oral every 12 hours  pantoprazole    Tablet 40 milliGRAM(s) Oral before breakfast  polyethylene glycol 3350 17 Gram(s) Oral two times a day  senna 2 Tablet(s) Oral at bedtime    IVF:  dextrose 5%. 1000 milliLiter(s) IV Continuous <Continuous>  sodium chloride 0.9%. 1000 milliLiter(s) IV Continuous <Continuous>    CULTURES:    RADIOLOGY & ADDITIONAL TESTS:      ASSESSMENT:  72y Male POD #7 s/p T10-pelvis posterior fusion with plastics closure    M43.8X9  No pertinent family history in first degree relatives  Family history of coronary artery disease  Handoff  MEWS Score  PAD (peripheral artery disease)  Hypercholesterolemia  Hypertension  Sagittal plane imbalance  Sagittal plane imbalance  Costochondral chest pain  Ventricular tachycardia  DM I (diabetes mellitus, type I), uncontrolled  Hypertension  Hypercholesterolemia  PAD (peripheral artery disease)  Spinal fusion  H/O laminectomy  History of hernia repair  S/P hip replacement  S/P knee replacement, bilateral      PLAN:  NEURO:  - Q4 neuro checks   - Cont. decadron 4q6, taper by cutting dose in half q2 days per Dr. Young, next dose: 2q6 on 7/7  - Dr. Chavarria following for pain: oxycontin 10 q12, dilaudid prn, perc prn   - PT/OT   - Consult PRS re: removal of drains given minimal output     CARDIOVASCULAR:  - BP goal: normotension  - Cont home meds: metoprolol, atorvastatin     PULMONARY:  - IS     RENAL:  - I&Os     GI:  - Bowel regimen   - D/c IVF once eating sufficiently   - Reg diet   - Cont. protonix while on steroids     HEME:  - Trend H/H     ID:  - Monitor for fevers     ENDO:  - ISS   - Lispro 4 units TID   - Glargine 10 QHS     DVT PROPHYLAXIS:  [x] Venodynes                                [x] Lovenox    DISPOSITION:  - Floor status   - Full code   - Dispo: GAIL once drains out   - D/w Dr. Young and Dr. Moreno

## 2018-07-06 NOTE — PROGRESS NOTE ADULT - SUBJECTIVE AND OBJECTIVE BOX
Yesterday patient wasn't feeling as well. PRS removed one KARON and one HV.  Vital Signs Last 24 Hrs  T(C): 36.8 (07-06-18 @ 04:57), Max: 37.6 (07-05-18 @ 07:05)  T(F): 98.3 (07-06-18 @ 04:57), Max: 99.7 (07-05-18 @ 07:05)  HR: 97 (07-06-18 @ 04:57) (74 - 97)  BP: 102/60 (07-06-18 @ 04:57) (102/60 - 153/65)  BP(mean): --  RR: 16 (07-06-18 @ 04:57) (15 - 17)  SpO2: 95% (07-06-18 @ 04:57) (92% - 97%)  I&O's Detail    04 Jul 2018 07:01  -  05 Jul 2018 07:00  --------------------------------------------------------  IN:    Oral Fluid: 1090 mL  Total IN: 1090 mL    OUT:    Bulb: 30 mL    Bulb: 10 mL    Evacuated Tube System: 20 mL    Evacuated Tube System: 115 mL    Voided: 1900 mL  Total OUT: 2075 mL    Total NET: -985 mL      05 Jul 2018 07:01  -  06 Jul 2018 06:38  --------------------------------------------------------  IN:    Oral Fluid: 480 mL    sodium chloride 0.9%: 150 mL    Sodium Chloride 0.9% IV Bolus: 500 mL    sodium chloride 0.9%.: 1200 mL  Total IN: 2330 mL    OUT:    Bulb: 40 mL    Evacuated Tube System: 50 mL    Voided: 1850 mL  Total OUT: 1940 mL    Total NET: 390 mL    no acute distress, AO  Incision with dressing intact. No collections.

## 2018-07-06 NOTE — PROGRESS NOTE ADULT - ASSESSMENT
S/p paraspinous muscle closure for spinal wound recon.    Q 3 days dressing change per plastic surgery team. Changed aquacel today  Continue KARON/HV  f/u WBC

## 2018-07-07 LAB
ANION GAP SERPL CALC-SCNC: 13 MMOL/L — SIGNIFICANT CHANGE UP (ref 5–17)
BUN SERPL-MCNC: 35 MG/DL — HIGH (ref 7–23)
CALCIUM SERPL-MCNC: 8.6 MG/DL — SIGNIFICANT CHANGE UP (ref 8.4–10.5)
CHLORIDE SERPL-SCNC: 102 MMOL/L — SIGNIFICANT CHANGE UP (ref 96–108)
CO2 SERPL-SCNC: 25 MMOL/L — SIGNIFICANT CHANGE UP (ref 22–31)
CREAT SERPL-MCNC: 1.15 MG/DL — SIGNIFICANT CHANGE UP (ref 0.5–1.3)
GLUCOSE BLDC GLUCOMTR-MCNC: 138 MG/DL — HIGH (ref 70–99)
GLUCOSE BLDC GLUCOMTR-MCNC: 194 MG/DL — HIGH (ref 70–99)
GLUCOSE BLDC GLUCOMTR-MCNC: 234 MG/DL — HIGH (ref 70–99)
GLUCOSE BLDC GLUCOMTR-MCNC: 284 MG/DL — HIGH (ref 70–99)
GLUCOSE SERPL-MCNC: 141 MG/DL — HIGH (ref 70–99)
HCT VFR BLD CALC: 28.1 % — LOW (ref 39–50)
HGB BLD-MCNC: 9 G/DL — LOW (ref 13–17)
MAGNESIUM SERPL-MCNC: 2 MG/DL — SIGNIFICANT CHANGE UP (ref 1.6–2.6)
MCHC RBC-ENTMCNC: 30 PG — SIGNIFICANT CHANGE UP (ref 27–34)
MCHC RBC-ENTMCNC: 32 G/DL — SIGNIFICANT CHANGE UP (ref 32–36)
MCV RBC AUTO: 93.7 FL — SIGNIFICANT CHANGE UP (ref 80–100)
PHOSPHATE SERPL-MCNC: 4.3 MG/DL — SIGNIFICANT CHANGE UP (ref 2.5–4.5)
PLATELET # BLD AUTO: 337 K/UL — SIGNIFICANT CHANGE UP (ref 150–400)
POTASSIUM SERPL-MCNC: 5 MMOL/L — SIGNIFICANT CHANGE UP (ref 3.5–5.3)
POTASSIUM SERPL-SCNC: 5 MMOL/L — SIGNIFICANT CHANGE UP (ref 3.5–5.3)
RBC # BLD: 3 M/UL — LOW (ref 4.2–5.8)
RBC # FLD: 14.9 % — SIGNIFICANT CHANGE UP (ref 10.3–16.9)
SODIUM SERPL-SCNC: 140 MMOL/L — SIGNIFICANT CHANGE UP (ref 135–145)
WBC # BLD: 15 K/UL — HIGH (ref 3.8–10.5)
WBC # FLD AUTO: 15 K/UL — HIGH (ref 3.8–10.5)

## 2018-07-07 RX ORDER — DEXAMETHASONE 0.5 MG/5ML
2 ELIXIR ORAL EVERY 6 HOURS
Qty: 0 | Refills: 0 | Status: DISCONTINUED | OUTPATIENT
Start: 2018-07-07 | End: 2018-07-09

## 2018-07-07 RX ADMIN — Medication 6: at 17:50

## 2018-07-07 RX ADMIN — Medication 100 MILLIGRAM(S): at 22:08

## 2018-07-07 RX ADMIN — ATORVASTATIN CALCIUM 80 MILLIGRAM(S): 80 TABLET, FILM COATED ORAL at 22:08

## 2018-07-07 RX ADMIN — ENOXAPARIN SODIUM 40 MILLIGRAM(S): 100 INJECTION SUBCUTANEOUS at 22:07

## 2018-07-07 RX ADMIN — Medication 4 MILLIGRAM(S): at 01:20

## 2018-07-07 RX ADMIN — SENNA PLUS 2 TABLET(S): 8.6 TABLET ORAL at 22:08

## 2018-07-07 RX ADMIN — PANTOPRAZOLE SODIUM 40 MILLIGRAM(S): 20 TABLET, DELAYED RELEASE ORAL at 22:08

## 2018-07-07 RX ADMIN — Medication 25 MILLIGRAM(S): at 06:19

## 2018-07-07 RX ADMIN — OXYCODONE HYDROCHLORIDE 10 MILLIGRAM(S): 5 TABLET ORAL at 11:19

## 2018-07-07 RX ADMIN — OXYCODONE HYDROCHLORIDE 10 MILLIGRAM(S): 5 TABLET ORAL at 22:08

## 2018-07-07 RX ADMIN — Medication 2 MILLIGRAM(S): at 11:18

## 2018-07-07 RX ADMIN — Medication 4 UNIT(S): at 07:47

## 2018-07-07 RX ADMIN — Medication 4: at 22:08

## 2018-07-07 RX ADMIN — Medication 4 UNIT(S): at 17:50

## 2018-07-07 RX ADMIN — Medication 4 UNIT(S): at 12:13

## 2018-07-07 RX ADMIN — INSULIN GLARGINE 10 UNIT(S): 100 INJECTION, SOLUTION SUBCUTANEOUS at 22:08

## 2018-07-07 RX ADMIN — Medication 2 MILLIGRAM(S): at 19:50

## 2018-07-07 RX ADMIN — OXYCODONE HYDROCHLORIDE 10 MILLIGRAM(S): 5 TABLET ORAL at 12:30

## 2018-07-07 RX ADMIN — Medication 2: at 12:12

## 2018-07-07 RX ADMIN — Medication 4 MILLIGRAM(S): at 06:19

## 2018-07-07 NOTE — PROGRESS NOTE ADULT - SUBJECTIVE AND OBJECTIVE BOX
HPI:  71 y/o male pmhx HTN, HLD, spinal stenosis s/p lumbar laminectomy last year presents today for elective T10-pelvis fusin today. Patient reports chronic LBP and ambulates with cane assistance. He denies any falls but reports imbalance with ambulating. He denies any leg pain, weakness, numbness or tingling. (29 Jun 2018 07:19)    OVERNIGHT EVENTS: POD#8 s/p T10-pelvis posterior fusion. Pt received 1 unit pRBCs on7/5 for symptomatic anemia. He feels much better today - reports more energy, improved appetite, no dizziness or weakness. He was also given 10mg decadron 7/5 and started on 4q6 to be tappers by 1/2 Q2 days. He is prepared to work with PT/OT today and plan for discharge. Afebrile overnight.   Plastic surgery note appreciated. 2 drains to be removed by them.    Vital Signs Last 24 Hrs  T(C): 36.6 (07 Jul 2018 04:43), Max: 37 (06 Jul 2018 21:26)  T(F): 97.8 (07 Jul 2018 04:43), Max: 98.6 (06 Jul 2018 21:26)  HR: 81 (07 Jul 2018 04:43) (76 - 81)  BP: 158/78 (07 Jul 2018 04:43) (116/60 - 158/78)  BP(mean): --  RR: 16 (07 Jul 2018 04:43) (16 - 18)  SpO2: 96% (07 Jul 2018 04:43) (95% - 98%)    I&O's Summary    06 Jul 2018 07:01  -  07 Jul 2018 07:00  --------------------------------------------------------  IN: 1075 mL / OUT: 2374.5 mL / NET: -1299.5 mL    07 Jul 2018 07:01  -  07 Jul 2018 12:17  --------------------------------------------------------  IN: 0 mL / OUT: 400 mL / NET: -400 mL        PHYSICAL EXAM:  Neurological:AOx3, NAD, FC, speech coherent   CN II-XII: EOMI, PERRL, face symmetric, tongue midline   Motor: MAEx4 4+/5 UE and LE B/L   No sensory deficit to touch  heart: SIS2 regular  Lung: clear lung sound        DIET: - Rgular  LABS:                        9.0    15.0  )-----------( 337      ( 07 Jul 2018 07:08 )             28.1     07-07    140  |  102  |  35<H>  ----------------------------<  141<H>  5.0   |  25  |  1.15    Ca    8.6      07 Jul 2018 07:08  Phos  4.3     07-07  Mg     2.0     07-07        Urinalysis Basic - ( 05 Jul 2018 16:28 )    Color: Yellow / Appearance: SL Cloudy / SG: <=1.005 / pH: x  Gluc: x / Ketone: NEGATIVE  / Bili: Negative / Urobili: 0.2 E.U./dL   Blood: x / Protein: NEGATIVE mg/dL / Nitrite: NEGATIVE   Leuk Esterase: NEGATIVE / RBC: Many /HPF / WBC < 5 /HPF   Sq Epi: x / Non Sq Epi: 0-5 /HPF / Bacteria: Present /HPF          CAPILLARY BLOOD GLUCOSE      POCT Blood Glucose.: 194 mg/dL (07 Jul 2018 11:39)  POCT Blood Glucose.: 138 mg/dL (07 Jul 2018 06:59)  POCT Blood Glucose.: 129 mg/dL (06 Jul 2018 22:14)  POCT Blood Glucose.: 163 mg/dL (06 Jul 2018 16:57)      Drug Levels: [] N/A    CSF Analysis: [] N/A      Allergies    No Known Allergies    Intolerances      MEDICATIONS:  Antibiotics:    Neuro:  acetaminophen   Tablet 650 milliGRAM(s) Oral every 6 hours PRN  acetaminophen   Tablet. 650 milliGRAM(s) Oral every 6 hours PRN  ondansetron Injectable 4 milliGRAM(s) IV Push every 6 hours PRN  oxyCODONE    5 mG/acetaminophen 325 mG 1 Tablet(s) Oral every 4 hours PRN  oxyCODONE    5 mG/acetaminophen 325 mG 2 Tablet(s) Oral every 6 hours PRN  oxyCODONE  ER Tablet 10 milliGRAM(s) Oral every 12 hours    Anticoagulation:  enoxaparin Injectable 40 milliGRAM(s) SubCutaneous at bedtime    OTHER:  atorvastatin 80 milliGRAM(s) Oral at bedtime  bisacodyl Suppository 10 milliGRAM(s) Rectal daily PRN  BUpivacaine liposome 1.3% Injectable (no eMAR) 20 milliLiter(s) Local Injection once  dexamethasone     Tablet 2 milliGRAM(s) Oral every 6 hours  dextrose 40% Gel 15 Gram(s) Oral once PRN  dextrose 50% Injectable 12.5 Gram(s) IV Push once  dextrose 50% Injectable 25 Gram(s) IV Push once  dextrose 50% Injectable 25 Gram(s) IV Push once  docusate sodium 100 milliGRAM(s) Oral three times a day  glucagon  Injectable 1 milliGRAM(s) IntraMuscular once PRN  insulin glargine Injectable (LANTUS) 10 Unit(s) SubCutaneous at bedtime  insulin lispro (HumaLOG) corrective regimen sliding scale   SubCutaneous Before meals and at bedtime  insulin lispro Injectable (HumaLOG) 4 Unit(s) SubCutaneous three times a day before meals  metoprolol tartrate 25 milliGRAM(s) Oral every 12 hours  pantoprazole    Tablet 40 milliGRAM(s) Oral before breakfast  polyethylene glycol 3350 17 Gram(s) Oral two times a day  senna 2 Tablet(s) Oral at bedtime    IVF:  dextrose 5%. 1000 milliLiter(s) IV Continuous <Continuous>    CULTURES:    RADIOLOGY & ADDITIONAL TESTS:      ASSESSMENT:72y Male POD #8 s/p T10-pelvis posterior fusion with plastics closure    PLAN: Monitor h/h transfuse for Hgb < 10 and patient is symptomatic  OOB, ambulate with brace.  PT/OT follow up  decadron heidi Chavarria of pain management to follow.    :    DVT PROPHYLAXIS:  [x] Venodynes                                [x] Heparin/Lovenox    DISPOSITION: GAIL

## 2018-07-07 NOTE — PROGRESS NOTE ADULT - ASSESSMENT
S/p paraspinous muscle closure of spinal incision.    - Continue KARON/HVAC. Will discuss with attending about d/c of drains prior to dispo home.   - PRS to follow.

## 2018-07-07 NOTE — PROGRESS NOTE ADULT - SUBJECTIVE AND OBJECTIVE BOX
Doing well.    Vital Signs Last 24 Hrs  T(C): 36.6 (07 Jul 2018 04:43), Max: 37 (06 Jul 2018 21:26)  T(F): 97.8 (07 Jul 2018 04:43), Max: 98.6 (06 Jul 2018 21:26)  HR: 81 (07 Jul 2018 04:43) (76 - 81)  BP: 158/78 (07 Jul 2018 04:43) (116/60 - 158/78)  BP(mean): --  RR: 16 (07 Jul 2018 04:43) (16 - 18)  SpO2: 96% (07 Jul 2018 04:43) (95% - 98%)    Incision clean, dry, intact. Aquacel dressing in place.	    I&O's Detail  KARON 44.5 mL  HVAC 330 mL (likely documentation error?)

## 2018-07-08 LAB
ANION GAP SERPL CALC-SCNC: 13 MMOL/L — SIGNIFICANT CHANGE UP (ref 5–17)
BUN SERPL-MCNC: 30 MG/DL — HIGH (ref 7–23)
CALCIUM SERPL-MCNC: 8.8 MG/DL — SIGNIFICANT CHANGE UP (ref 8.4–10.5)
CHLORIDE SERPL-SCNC: 99 MMOL/L — SIGNIFICANT CHANGE UP (ref 96–108)
CO2 SERPL-SCNC: 26 MMOL/L — SIGNIFICANT CHANGE UP (ref 22–31)
CREAT SERPL-MCNC: 1.03 MG/DL — SIGNIFICANT CHANGE UP (ref 0.5–1.3)
GLUCOSE BLDC GLUCOMTR-MCNC: 100 MG/DL — HIGH (ref 70–99)
GLUCOSE BLDC GLUCOMTR-MCNC: 123 MG/DL — HIGH (ref 70–99)
GLUCOSE BLDC GLUCOMTR-MCNC: 127 MG/DL — HIGH (ref 70–99)
GLUCOSE BLDC GLUCOMTR-MCNC: 152 MG/DL — HIGH (ref 70–99)
GLUCOSE SERPL-MCNC: 93 MG/DL — SIGNIFICANT CHANGE UP (ref 70–99)
HCT VFR BLD CALC: 30.7 % — LOW (ref 39–50)
HGB BLD-MCNC: 10.1 G/DL — LOW (ref 13–17)
MCHC RBC-ENTMCNC: 30.9 PG — SIGNIFICANT CHANGE UP (ref 27–34)
MCHC RBC-ENTMCNC: 32.9 G/DL — SIGNIFICANT CHANGE UP (ref 32–36)
MCV RBC AUTO: 93.9 FL — SIGNIFICANT CHANGE UP (ref 80–100)
PLATELET # BLD AUTO: 413 K/UL — HIGH (ref 150–400)
POTASSIUM SERPL-MCNC: 4.6 MMOL/L — SIGNIFICANT CHANGE UP (ref 3.5–5.3)
POTASSIUM SERPL-SCNC: 4.6 MMOL/L — SIGNIFICANT CHANGE UP (ref 3.5–5.3)
RBC # BLD: 3.27 M/UL — LOW (ref 4.2–5.8)
RBC # FLD: 14.8 % — SIGNIFICANT CHANGE UP (ref 10.3–16.9)
SODIUM SERPL-SCNC: 138 MMOL/L — SIGNIFICANT CHANGE UP (ref 135–145)
WBC # BLD: 16.7 K/UL — HIGH (ref 3.8–10.5)
WBC # FLD AUTO: 16.7 K/UL — HIGH (ref 3.8–10.5)

## 2018-07-08 PROCEDURE — 72100 X-RAY EXAM L-S SPINE 2/3 VWS: CPT | Mod: 26

## 2018-07-08 PROCEDURE — 72070 X-RAY EXAM THORAC SPINE 2VWS: CPT | Mod: 26

## 2018-07-08 RX ORDER — LACTULOSE 10 G/15ML
20 SOLUTION ORAL EVERY 4 HOURS
Qty: 0 | Refills: 0 | Status: DISCONTINUED | OUTPATIENT
Start: 2018-07-08 | End: 2018-07-08

## 2018-07-08 RX ADMIN — Medication 25 MILLIGRAM(S): at 06:03

## 2018-07-08 RX ADMIN — OXYCODONE HYDROCHLORIDE 10 MILLIGRAM(S): 5 TABLET ORAL at 11:45

## 2018-07-08 RX ADMIN — POLYETHYLENE GLYCOL 3350 17 GRAM(S): 17 POWDER, FOR SOLUTION ORAL at 06:03

## 2018-07-08 RX ADMIN — OXYCODONE HYDROCHLORIDE 10 MILLIGRAM(S): 5 TABLET ORAL at 21:55

## 2018-07-08 RX ADMIN — Medication 100 MILLIGRAM(S): at 06:03

## 2018-07-08 RX ADMIN — INSULIN GLARGINE 10 UNIT(S): 100 INJECTION, SOLUTION SUBCUTANEOUS at 21:53

## 2018-07-08 RX ADMIN — Medication 2 MILLIGRAM(S): at 18:10

## 2018-07-08 RX ADMIN — ENOXAPARIN SODIUM 40 MILLIGRAM(S): 100 INJECTION SUBCUTANEOUS at 21:54

## 2018-07-08 RX ADMIN — Medication 25 MILLIGRAM(S): at 18:10

## 2018-07-08 RX ADMIN — Medication 2: at 18:10

## 2018-07-08 RX ADMIN — OXYCODONE HYDROCHLORIDE 10 MILLIGRAM(S): 5 TABLET ORAL at 10:47

## 2018-07-08 RX ADMIN — ATORVASTATIN CALCIUM 80 MILLIGRAM(S): 80 TABLET, FILM COATED ORAL at 21:54

## 2018-07-08 RX ADMIN — PANTOPRAZOLE SODIUM 40 MILLIGRAM(S): 20 TABLET, DELAYED RELEASE ORAL at 21:54

## 2018-07-08 RX ADMIN — Medication 2 MILLIGRAM(S): at 14:06

## 2018-07-08 RX ADMIN — Medication 2 MILLIGRAM(S): at 00:14

## 2018-07-08 RX ADMIN — OXYCODONE HYDROCHLORIDE 10 MILLIGRAM(S): 5 TABLET ORAL at 22:53

## 2018-07-08 RX ADMIN — Medication 4 UNIT(S): at 08:03

## 2018-07-08 RX ADMIN — Medication 4 UNIT(S): at 18:13

## 2018-07-08 RX ADMIN — Medication 2 MILLIGRAM(S): at 06:03

## 2018-07-08 NOTE — PROGRESS NOTE ADULT - SUBJECTIVE AND OBJECTIVE BOX
HPI:  71 y/o male pmhx HTN, HLD, spinal stenosis s/p lumbar laminectomy last year presents today for elective T10-pelvis fusin today. Patient reports chronic LBP and ambulates with cane assistance. He denies any falls but reports imbalance with ambulating. He denies any leg pain, weakness, numbness or tingling. (29 Jun 2018 07:19)    OVERNIGHT EVENTS:  Pt c/o constipation. Pain controlled. OOB w brace and walker. Last 2 drains removed today by plastics.     Vital Signs Last 24 Hrs  T(C): 36.6 (08 Jul 2018 04:46), Max: 37.1 (07 Jul 2018 20:54)  T(F): 97.8 (08 Jul 2018 04:46), Max: 98.8 (07 Jul 2018 20:54)  HR: 78 (08 Jul 2018 04:46) (75 - 94)  BP: 146/77 (08 Jul 2018 04:46) (117/55 - 146/77)  BP(mean): --  RR: 16 (08 Jul 2018 04:46) (16 - 17)  SpO2: 96% (08 Jul 2018 04:46) (94% - 98%)    I&O's Summary    07 Jul 2018 07:01  -  08 Jul 2018 07:00  --------------------------------------------------------  IN: 0 mL / OUT: 703 mL / NET: -703 mL        PHYSICAL EXAM:  Neurological:  AxOx3  5/5 motor x4ext   sensory intact  Incision/Wound:C/D/I    TUBES/LINES:  [] Ivory  [] Lumbar Drain  [] Wound Drains  [] Others      DIET:  [] NPO  [x] Mechanical  [] Tube feeds    LABS:                        9.0    15.0  )-----------( 337      ( 07 Jul 2018 07:08 )             28.1     07-07    140  |  102  |  35<H>  ----------------------------<  141<H>  5.0   |  25  |  1.15    Ca    8.6      07 Jul 2018 07:08  Phos  4.3     07-07  Mg     2.0     07-07              CAPILLARY BLOOD GLUCOSE      POCT Blood Glucose.: 123 mg/dL (08 Jul 2018 07:19)  POCT Blood Glucose.: 234 mg/dL (07 Jul 2018 21:57)  POCT Blood Glucose.: 284 mg/dL (07 Jul 2018 17:01)  POCT Blood Glucose.: 194 mg/dL (07 Jul 2018 11:39)      Drug Levels: [] N/A    CSF Analysis: [] N/A      Allergies    No Known Allergies    Intolerances      MEDICATIONS:  Antibiotics:    Neuro:  acetaminophen   Tablet 650 milliGRAM(s) Oral every 6 hours PRN  acetaminophen   Tablet. 650 milliGRAM(s) Oral every 6 hours PRN  ondansetron Injectable 4 milliGRAM(s) IV Push every 6 hours PRN  oxyCODONE    5 mG/acetaminophen 325 mG 1 Tablet(s) Oral every 4 hours PRN  oxyCODONE    5 mG/acetaminophen 325 mG 2 Tablet(s) Oral every 6 hours PRN  oxyCODONE  ER Tablet 10 milliGRAM(s) Oral every 12 hours    Anticoagulation:  enoxaparin Injectable 40 milliGRAM(s) SubCutaneous at bedtime    OTHER:  atorvastatin 80 milliGRAM(s) Oral at bedtime  bisacodyl Suppository 10 milliGRAM(s) Rectal daily PRN  BUpivacaine liposome 1.3% Injectable (no eMAR) 20 milliLiter(s) Local Injection once  dexamethasone     Tablet 2 milliGRAM(s) Oral every 6 hours  dextrose 40% Gel 15 Gram(s) Oral once PRN  dextrose 50% Injectable 12.5 Gram(s) IV Push once  dextrose 50% Injectable 25 Gram(s) IV Push once  dextrose 50% Injectable 25 Gram(s) IV Push once  docusate sodium 100 milliGRAM(s) Oral three times a day  glucagon  Injectable 1 milliGRAM(s) IntraMuscular once PRN  insulin glargine Injectable (LANTUS) 10 Unit(s) SubCutaneous at bedtime  insulin lispro (HumaLOG) corrective regimen sliding scale   SubCutaneous Before meals and at bedtime  insulin lispro Injectable (HumaLOG) 4 Unit(s) SubCutaneous three times a day before meals  lactulose Syrup 20 Gram(s) Oral every 4 hours  metoprolol tartrate 25 milliGRAM(s) Oral every 12 hours  pantoprazole    Tablet 40 milliGRAM(s) Oral before breakfast  polyethylene glycol 3350 17 Gram(s) Oral two times a day  senna 2 Tablet(s) Oral at bedtime    IVF:  dextrose 5%. 1000 milliLiter(s) IV Continuous <Continuous>    CULTURES:    RADIOLOGY & ADDITIONAL TESTS:      ASSESSMENT:  72y Male s/p s/p T10-pelvis posterior fusion, chest tube 6/29    M43.8X9  No pertinent family history in first degree relatives  Family history of coronary artery disease  Handoff  MEWS Score  PAD (peripheral artery disease)  Hypercholesterolemia  Hypertension  Sagittal plane imbalance  Sagittal plane imbalance  Costochondral chest pain  Ventricular tachycardia  DM I (diabetes mellitus, type I), uncontrolled  Hypertension  Hypercholesterolemia  PAD (peripheral artery disease)  Spinal fusion  H/O laminectomy  History of hernia repair  S/P hip replacement  S/P knee replacement, bilateral      PLAN:  NEURO:  -decadron taper  -pain management  -constipation. Lactulose ordered  -f/u labs  -xray spine standing ordered  -encourage IS and OOB  -H/H stable. Pt asymptomatic  -awaiting rehab placemetn  -D/W     DVT PROPHYLAXIS:  [x] Venodynes                                [x] Lovenox    DISPOSITION:rehab HPI:  73 y/o male pmhx HTN, HLD, spinal stenosis s/p lumbar laminectomy last year presents today for elective T10-pelvis fusin today. Patient reports chronic LBP and ambulates with cane assistance. He denies any falls but reports imbalance with ambulating. He denies any leg pain, weakness, numbness or tingling. (29 Jun 2018 07:19)    OVERNIGHT EVENTS:  Pt c/o constipation. Pain controlled. OOB w brace and walker. Last 2 drains removed today by plastics.     Vital Signs Last 24 Hrs  T(C): 36.6 (08 Jul 2018 04:46), Max: 37.1 (07 Jul 2018 20:54)  T(F): 97.8 (08 Jul 2018 04:46), Max: 98.8 (07 Jul 2018 20:54)  HR: 78 (08 Jul 2018 04:46) (75 - 94)  BP: 146/77 (08 Jul 2018 04:46) (117/55 - 146/77)  BP(mean): --  RR: 16 (08 Jul 2018 04:46) (16 - 17)  SpO2: 96% (08 Jul 2018 04:46) (94% - 98%)    I&O's Summary    07 Jul 2018 07:01  -  08 Jul 2018 07:00  --------------------------------------------------------  IN: 0 mL / OUT: 703 mL / NET: -703 mL        PHYSICAL EXAM:  Neurological:  AxOx3  5/5 motor x4ext   sensory intact  Incision/Wound:C/D/I    TUBES/LINES:  [] Ivory  [] Lumbar Drain  [] Wound Drains  [] Others      DIET:  [] NPO  [x] Mechanical  [] Tube feeds    LABS:                        9.0    15.0  )-----------( 337      ( 07 Jul 2018 07:08 )             28.1     07-07    140  |  102  |  35<H>  ----------------------------<  141<H>  5.0   |  25  |  1.15    Ca    8.6      07 Jul 2018 07:08  Phos  4.3     07-07  Mg     2.0     07-07              CAPILLARY BLOOD GLUCOSE      POCT Blood Glucose.: 123 mg/dL (08 Jul 2018 07:19)  POCT Blood Glucose.: 234 mg/dL (07 Jul 2018 21:57)  POCT Blood Glucose.: 284 mg/dL (07 Jul 2018 17:01)  POCT Blood Glucose.: 194 mg/dL (07 Jul 2018 11:39)      Drug Levels: [] N/A    CSF Analysis: [] N/A      Allergies    No Known Allergies    Intolerances      MEDICATIONS:  Antibiotics:    Neuro:  acetaminophen   Tablet 650 milliGRAM(s) Oral every 6 hours PRN  acetaminophen   Tablet. 650 milliGRAM(s) Oral every 6 hours PRN  ondansetron Injectable 4 milliGRAM(s) IV Push every 6 hours PRN  oxyCODONE    5 mG/acetaminophen 325 mG 1 Tablet(s) Oral every 4 hours PRN  oxyCODONE    5 mG/acetaminophen 325 mG 2 Tablet(s) Oral every 6 hours PRN  oxyCODONE  ER Tablet 10 milliGRAM(s) Oral every 12 hours    Anticoagulation:  enoxaparin Injectable 40 milliGRAM(s) SubCutaneous at bedtime    OTHER:  atorvastatin 80 milliGRAM(s) Oral at bedtime  bisacodyl Suppository 10 milliGRAM(s) Rectal daily PRN  BUpivacaine liposome 1.3% Injectable (no eMAR) 20 milliLiter(s) Local Injection once  dexamethasone     Tablet 2 milliGRAM(s) Oral every 6 hours  dextrose 40% Gel 15 Gram(s) Oral once PRN  dextrose 50% Injectable 12.5 Gram(s) IV Push once  dextrose 50% Injectable 25 Gram(s) IV Push once  dextrose 50% Injectable 25 Gram(s) IV Push once  docusate sodium 100 milliGRAM(s) Oral three times a day  glucagon  Injectable 1 milliGRAM(s) IntraMuscular once PRN  insulin glargine Injectable (LANTUS) 10 Unit(s) SubCutaneous at bedtime  insulin lispro (HumaLOG) corrective regimen sliding scale   SubCutaneous Before meals and at bedtime  insulin lispro Injectable (HumaLOG) 4 Unit(s) SubCutaneous three times a day before meals  lactulose Syrup 20 Gram(s) Oral every 4 hours  metoprolol tartrate 25 milliGRAM(s) Oral every 12 hours  pantoprazole    Tablet 40 milliGRAM(s) Oral before breakfast  polyethylene glycol 3350 17 Gram(s) Oral two times a day  senna 2 Tablet(s) Oral at bedtime    IVF:  dextrose 5%. 1000 milliLiter(s) IV Continuous <Continuous>    CULTURES:    RADIOLOGY & ADDITIONAL TESTS:      ASSESSMENT:  72y Male s/p s/p T10-pelvis posterior fusion 6/29    M43.8X9  No pertinent family history in first degree relatives  Family history of coronary artery disease  Handoff  MEWS Score  PAD (peripheral artery disease)  Hypercholesterolemia  Hypertension  Sagittal plane imbalance  Sagittal plane imbalance  Costochondral chest pain  Ventricular tachycardia  DM I (diabetes mellitus, type I), uncontrolled  Hypertension  Hypercholesterolemia  PAD (peripheral artery disease)  Spinal fusion  H/O laminectomy  History of hernia repair  S/P hip replacement  S/P knee replacement, bilateral      PLAN:  NEURO:  -decadron taper  -pain management  -constipation. Lactulose ordered  -f/u labs  -xray spine standing ordered  -encourage IS and OOB  -H/H stable. Pt asymptomatic  -awaiting rehab placemetn  -D/W     DVT PROPHYLAXIS:  [x] Venodynes                                [x] Lovenox    DISPOSITION:rehab HPI:  71 y/o male pmhx HTN, HLD, spinal stenosis s/p lumbar laminectomy last year presents today for elective T10-pelvis fusin today. Patient reports chronic LBP and ambulates with cane assistance. He denies any falls but reports imbalance with ambulating. He denies any leg pain, weakness, numbness or tingling. (29 Jun 2018 07:19)    OVERNIGHT EVENTS:  Pt c/o constipation. Pain controlled. OOB w brace and walker. Last 2 drains removed today by plastics.     Vital Signs Last 24 Hrs  T(C): 36.6 (08 Jul 2018 04:46), Max: 37.1 (07 Jul 2018 20:54)  T(F): 97.8 (08 Jul 2018 04:46), Max: 98.8 (07 Jul 2018 20:54)  HR: 78 (08 Jul 2018 04:46) (75 - 94)  BP: 146/77 (08 Jul 2018 04:46) (117/55 - 146/77)  BP(mean): --  RR: 16 (08 Jul 2018 04:46) (16 - 17)  SpO2: 96% (08 Jul 2018 04:46) (94% - 98%)    I&O's Summary    07 Jul 2018 07:01  -  08 Jul 2018 07:00  --------------------------------------------------------  IN: 0 mL / OUT: 703 mL / NET: -703 mL        PHYSICAL EXAM:  Neurological:  AxOx3  5/5 motor x4ext   sensory intact  Incision/Wound:C/D/I    TUBES/LINES:  [] Ivory  [] Lumbar Drain  [] Wound Drains  [] Others      DIET:  [] NPO  [x] Mechanical  [] Tube feeds    LABS:                        9.0    15.0  )-----------( 337      ( 07 Jul 2018 07:08 )             28.1     07-07    140  |  102  |  35<H>  ----------------------------<  141<H>  5.0   |  25  |  1.15    Ca    8.6      07 Jul 2018 07:08  Phos  4.3     07-07  Mg     2.0     07-07              CAPILLARY BLOOD GLUCOSE      POCT Blood Glucose.: 123 mg/dL (08 Jul 2018 07:19)  POCT Blood Glucose.: 234 mg/dL (07 Jul 2018 21:57)  POCT Blood Glucose.: 284 mg/dL (07 Jul 2018 17:01)  POCT Blood Glucose.: 194 mg/dL (07 Jul 2018 11:39)      Drug Levels: [] N/A    CSF Analysis: [] N/A      Allergies    No Known Allergies    Intolerances      MEDICATIONS:  Antibiotics:    Neuro:  acetaminophen   Tablet 650 milliGRAM(s) Oral every 6 hours PRN  acetaminophen   Tablet. 650 milliGRAM(s) Oral every 6 hours PRN  ondansetron Injectable 4 milliGRAM(s) IV Push every 6 hours PRN  oxyCODONE    5 mG/acetaminophen 325 mG 1 Tablet(s) Oral every 4 hours PRN  oxyCODONE    5 mG/acetaminophen 325 mG 2 Tablet(s) Oral every 6 hours PRN  oxyCODONE  ER Tablet 10 milliGRAM(s) Oral every 12 hours    Anticoagulation:  enoxaparin Injectable 40 milliGRAM(s) SubCutaneous at bedtime    OTHER:  atorvastatin 80 milliGRAM(s) Oral at bedtime  bisacodyl Suppository 10 milliGRAM(s) Rectal daily PRN  BUpivacaine liposome 1.3% Injectable (no eMAR) 20 milliLiter(s) Local Injection once  dexamethasone     Tablet 2 milliGRAM(s) Oral every 6 hours  dextrose 40% Gel 15 Gram(s) Oral once PRN  dextrose 50% Injectable 12.5 Gram(s) IV Push once  dextrose 50% Injectable 25 Gram(s) IV Push once  dextrose 50% Injectable 25 Gram(s) IV Push once  docusate sodium 100 milliGRAM(s) Oral three times a day  glucagon  Injectable 1 milliGRAM(s) IntraMuscular once PRN  insulin glargine Injectable (LANTUS) 10 Unit(s) SubCutaneous at bedtime  insulin lispro (HumaLOG) corrective regimen sliding scale   SubCutaneous Before meals and at bedtime  insulin lispro Injectable (HumaLOG) 4 Unit(s) SubCutaneous three times a day before meals  lactulose Syrup 20 Gram(s) Oral every 4 hours  metoprolol tartrate 25 milliGRAM(s) Oral every 12 hours  pantoprazole    Tablet 40 milliGRAM(s) Oral before breakfast  polyethylene glycol 3350 17 Gram(s) Oral two times a day  senna 2 Tablet(s) Oral at bedtime    IVF:  dextrose 5%. 1000 milliLiter(s) IV Continuous <Continuous>    CULTURES:    RADIOLOGY & ADDITIONAL TESTS:      ASSESSMENT:  72y Male s/p s/p T10-pelvis posterior fusion 6/29    M43.8X9  No pertinent family history in first degree relatives  Family history of coronary artery disease  Handoff  MEWS Score  PAD (peripheral artery disease)  Hypercholesterolemia  Hypertension  Sagittal plane imbalance  Sagittal plane imbalance  Costochondral chest pain  Ventricular tachycardia  DM I (diabetes mellitus, type I), uncontrolled  Hypertension  Hypercholesterolemia  PAD (peripheral artery disease)  Spinal fusion  H/O laminectomy  History of hernia repair  S/P hip replacement  S/P knee replacement, bilateral      PLAN:  NEURO:  -decadron taper  -pain management  -constipation. Lactulose ordered  -f/u labs  -leukocytosis. Will start Bactrim prophylactically as per   -xray spine standing ordered  -encourage IS and OOB  -H/H stable. Pt asymptomatic  -awaiting rehab placement  -D/W     DVT PROPHYLAXIS:  [x] Venodynes                                [x] Lovenox    DISPOSITION:rehab

## 2018-07-09 DIAGNOSIS — D72.829 ELEVATED WHITE BLOOD CELL COUNT, UNSPECIFIED: ICD-10-CM

## 2018-07-09 LAB
GLUCOSE BLDC GLUCOMTR-MCNC: 112 MG/DL — HIGH (ref 70–99)
GLUCOSE BLDC GLUCOMTR-MCNC: 115 MG/DL — HIGH (ref 70–99)
GLUCOSE BLDC GLUCOMTR-MCNC: 126 MG/DL — HIGH (ref 70–99)

## 2018-07-09 PROCEDURE — 99232 SBSQ HOSP IP/OBS MODERATE 35: CPT | Mod: GC

## 2018-07-09 PROCEDURE — 99232 SBSQ HOSP IP/OBS MODERATE 35: CPT

## 2018-07-09 RX ORDER — DEXAMETHASONE 0.5 MG/5ML
2 ELIXIR ORAL EVERY 12 HOURS
Qty: 0 | Refills: 0 | Status: DISCONTINUED | OUTPATIENT
Start: 2018-07-09 | End: 2018-07-11

## 2018-07-09 RX ADMIN — Medication 1 TABLET(S): at 17:18

## 2018-07-09 RX ADMIN — Medication 2 MILLIGRAM(S): at 17:21

## 2018-07-09 RX ADMIN — OXYCODONE HYDROCHLORIDE 10 MILLIGRAM(S): 5 TABLET ORAL at 22:19

## 2018-07-09 RX ADMIN — Medication 4 UNIT(S): at 17:19

## 2018-07-09 RX ADMIN — Medication 25 MILLIGRAM(S): at 06:09

## 2018-07-09 RX ADMIN — PANTOPRAZOLE SODIUM 40 MILLIGRAM(S): 20 TABLET, DELAYED RELEASE ORAL at 22:20

## 2018-07-09 RX ADMIN — Medication 1 TABLET(S): at 06:09

## 2018-07-09 RX ADMIN — Medication 4 UNIT(S): at 08:26

## 2018-07-09 RX ADMIN — ENOXAPARIN SODIUM 40 MILLIGRAM(S): 100 INJECTION SUBCUTANEOUS at 22:19

## 2018-07-09 RX ADMIN — SENNA PLUS 2 TABLET(S): 8.6 TABLET ORAL at 22:20

## 2018-07-09 RX ADMIN — Medication 4 UNIT(S): at 12:45

## 2018-07-09 RX ADMIN — Medication 25 MILLIGRAM(S): at 17:19

## 2018-07-09 RX ADMIN — Medication 2 MILLIGRAM(S): at 00:03

## 2018-07-09 RX ADMIN — Medication 100 MILLIGRAM(S): at 22:20

## 2018-07-09 RX ADMIN — Medication 2 MILLIGRAM(S): at 06:08

## 2018-07-09 RX ADMIN — OXYCODONE HYDROCHLORIDE 10 MILLIGRAM(S): 5 TABLET ORAL at 09:00

## 2018-07-09 RX ADMIN — OXYCODONE HYDROCHLORIDE 10 MILLIGRAM(S): 5 TABLET ORAL at 10:00

## 2018-07-09 RX ADMIN — INSULIN GLARGINE 10 UNIT(S): 100 INJECTION, SOLUTION SUBCUTANEOUS at 22:20

## 2018-07-09 RX ADMIN — POLYETHYLENE GLYCOL 3350 17 GRAM(S): 17 POWDER, FOR SOLUTION ORAL at 17:18

## 2018-07-09 RX ADMIN — ATORVASTATIN CALCIUM 80 MILLIGRAM(S): 80 TABLET, FILM COATED ORAL at 22:20

## 2018-07-09 RX ADMIN — OXYCODONE HYDROCHLORIDE 10 MILLIGRAM(S): 5 TABLET ORAL at 23:00

## 2018-07-09 NOTE — CHART NOTE - NSCHARTNOTEFT_GEN_A_CORE
Admitting Diagnosis:   Patient is a 72y old  Male who presents with a chief complaint of elective spine surgery (29 Jun 2018 07:19)      PAST MEDICAL & SURGICAL HISTORY:  PAD (peripheral artery disease)  Hypercholesterolemia  Hypertension  H/O laminectomy  History of hernia repair  S/P hip replacement: right hip  S/P knee replacement, bilateral      Current Nutrition Order: Regular diet       PO Intake: Good (%) [ X  ]  Fair (50-75%) [   ] Poor (<25%) [   ]    GI Issues: Denies N/V/D/C at this time    Pain: Denies pain    Skin Integrity: surgical incision back    Labs:   07-08    138  |  99  |  30<H>  ----------------------------<  93  4.6   |  26  |  1.03    Ca    8.8      08 Jul 2018 12:46      CAPILLARY BLOOD GLUCOSE      POCT Blood Glucose.: 115 mg/dL (09 Jul 2018 07:12)  POCT Blood Glucose.: 127 mg/dL (08 Jul 2018 21:25)  POCT Blood Glucose.: 152 mg/dL (08 Jul 2018 17:14)  POCT Blood Glucose.: 100 mg/dL (08 Jul 2018 11:49)      Medications:  MEDICATIONS  (STANDING):  atorvastatin 80 milliGRAM(s) Oral at bedtime  BUpivacaine liposome 1.3% Injectable (no eMAR) 20 milliLiter(s) Local Injection once  dexamethasone     Tablet 2 milliGRAM(s) Oral every 6 hours  dextrose 5%. 1000 milliLiter(s) (50 mL/Hr) IV Continuous <Continuous>  dextrose 50% Injectable 12.5 Gram(s) IV Push once  dextrose 50% Injectable 25 Gram(s) IV Push once  dextrose 50% Injectable 25 Gram(s) IV Push once  docusate sodium 100 milliGRAM(s) Oral three times a day  enoxaparin Injectable 40 milliGRAM(s) SubCutaneous at bedtime  insulin glargine Injectable (LANTUS) 10 Unit(s) SubCutaneous at bedtime  insulin lispro (HumaLOG) corrective regimen sliding scale   SubCutaneous Before meals and at bedtime  insulin lispro Injectable (HumaLOG) 4 Unit(s) SubCutaneous three times a day before meals  metoprolol tartrate 25 milliGRAM(s) Oral every 12 hours  oxyCODONE  ER Tablet 10 milliGRAM(s) Oral every 12 hours  pantoprazole    Tablet 40 milliGRAM(s) Oral before breakfast  polyethylene glycol 3350 17 Gram(s) Oral two times a day  senna 2 Tablet(s) Oral at bedtime  trimethoprim  160 mG/sulfamethoxazole 800 mG 1 Tablet(s) Oral two times a day    MEDICATIONS  (PRN):  acetaminophen   Tablet 650 milliGRAM(s) Oral every 6 hours PRN For Temp greater than 38 C (100.4 F)  acetaminophen   Tablet. 650 milliGRAM(s) Oral every 6 hours PRN Mild Pain (1 - 3)  bisacodyl Suppository 10 milliGRAM(s) Rectal daily PRN Constipation  dextrose 40% Gel 15 Gram(s) Oral once PRN Blood Glucose LESS THAN 70 milliGRAM(s)/deciliter  glucagon  Injectable 1 milliGRAM(s) IntraMuscular once PRN Glucose LESS THAN 70 milligrams/deciliter  ondansetron Injectable 4 milliGRAM(s) IV Push every 6 hours PRN Nausea  oxyCODONE    5 mG/acetaminophen 325 mG 1 Tablet(s) Oral every 4 hours PRN Moderate Pain  oxyCODONE    5 mG/acetaminophen 325 mG 2 Tablet(s) Oral every 6 hours PRN Severe Pain      Weight: 132kg       Weight Change: No new weights since admission    Estimated energy needs: 20-25kcal/kg IBW (86.1kg)= 1722-2152kcal/day  Protein: 1.2-1.4g/kg= 103-120g/day  Fluid: 30-35mL/kg= 2583-3013mL/day    Subjective: 71 YO male seen for nutrition follow-up. PO intake % of meals with good tolerance. Regular diet remains appropriate at this time, expect patient to meet estimated energy needs via PO intake alone.    Previous Nutrition Diagnosis: Increased nutrient needs RT increased demand post-op AEB s/p spinal surgery    Active [ X  ]  Resolved [   ]    If resolved, new PES:     Goal: Meet >75% of EER via PO x 7 days    Recommendations: Continue current nutrition plan of care    Education: General healthful eating    Risk Level: High [   ] Moderate [  X ] Low [   ]

## 2018-07-09 NOTE — PROGRESS NOTE ADULT - SUBJECTIVE AND OBJECTIVE BOX
HPI:  71 y/o male pmhx HTN, HLD, spinal stenosis s/p lumbar laminectomy last year presents today for elective T10-pelvis fusin today. Patient reports chronic LBP and ambulates with cane assistance. He denies any falls but reports imbalance with ambulating. He denies any leg pain, weakness, numbness or tingling. (29 Jun 2018 07:19)    OVERNIGHT EVENTS: No acute events overnight.   Vital Signs Last 24 Hrs  T(C): 36.1 (09 Jul 2018 06:24), Max: 36.9 (08 Jul 2018 22:00)  T(F): 97 (09 Jul 2018 06:24), Max: 98.4 (08 Jul 2018 22:00)  HR: 71 (09 Jul 2018 06:24) (71 - 86)  BP: 150/68 (09 Jul 2018 06:24) (102/67 - 150/68)  BP(mean): --  RR: 16 (09 Jul 2018 06:24) (15 - 16)  SpO2: 96% (09 Jul 2018 06:24) (96% - 97%)    I&O's Summary    08 Jul 2018 07:01  -  09 Jul 2018 07:00  --------------------------------------------------------  IN: 0 mL / OUT: 750 mL / NET: -750 mL        PHYSICAL EXAM:  Neurological:    Motor exam:         [] Upper extremity              Bi(c5)  WE(c6)  EE(c7)   FF(c8)                                                R         5/5        5/5        5/5       5/5                                               L          5/5        5/5        5/5       5/5         [] Lower extremeity          HF(l2)   KE(l3)    TA(l4)   EHL(l5)  GS(s1)                                                 R        5/5        5/5        5/5       5/5         5/5                                               L         5/5        5/5       5/5       5/5          5/5                                                        [] warm well perfused; capillary refill <3 seconds     Sensation: [] intact to light touch  [] decreased:       Cardiovascular:  Respiratory:  Gastrointestinal:  Genitourinary:  Extremities:  Incision/Wound:    TUBES/LINES:  [] Ivory  [] Lumbar Drain  [] Wound Drains  [] Others      DIET:  [] NPO  [] Mechanical  [] Tube feeds    LABS:                        10.1   16.7  )-----------( 413      ( 08 Jul 2018 12:46 )             30.7     07-08    138  |  99  |  30<H>  ----------------------------<  93  4.6   |  26  |  1.03    Ca    8.8      08 Jul 2018 12:46              CAPILLARY BLOOD GLUCOSE      POCT Blood Glucose.: 115 mg/dL (09 Jul 2018 07:12)  POCT Blood Glucose.: 127 mg/dL (08 Jul 2018 21:25)  POCT Blood Glucose.: 152 mg/dL (08 Jul 2018 17:14)  POCT Blood Glucose.: 100 mg/dL (08 Jul 2018 11:49)      Drug Levels: [] N/A    CSF Analysis: [] N/A      Allergies    No Known Allergies    Intolerances      MEDICATIONS:  Antibiotics:  trimethoprim  160 mG/sulfamethoxazole 800 mG 1 Tablet(s) Oral two times a day    Neuro:  acetaminophen   Tablet 650 milliGRAM(s) Oral every 6 hours PRN  acetaminophen   Tablet. 650 milliGRAM(s) Oral every 6 hours PRN  ondansetron Injectable 4 milliGRAM(s) IV Push every 6 hours PRN  oxyCODONE    5 mG/acetaminophen 325 mG 1 Tablet(s) Oral every 4 hours PRN  oxyCODONE    5 mG/acetaminophen 325 mG 2 Tablet(s) Oral every 6 hours PRN  oxyCODONE  ER Tablet 10 milliGRAM(s) Oral every 12 hours    Anticoagulation:  enoxaparin Injectable 40 milliGRAM(s) SubCutaneous at bedtime    OTHER:  atorvastatin 80 milliGRAM(s) Oral at bedtime  bisacodyl Suppository 10 milliGRAM(s) Rectal daily PRN  BUpivacaine liposome 1.3% Injectable (no eMAR) 20 milliLiter(s) Local Injection once  dexamethasone     Tablet 2 milliGRAM(s) Oral every 6 hours  dextrose 40% Gel 15 Gram(s) Oral once PRN  dextrose 50% Injectable 12.5 Gram(s) IV Push once  dextrose 50% Injectable 25 Gram(s) IV Push once  dextrose 50% Injectable 25 Gram(s) IV Push once  docusate sodium 100 milliGRAM(s) Oral three times a day  glucagon  Injectable 1 milliGRAM(s) IntraMuscular once PRN  insulin glargine Injectable (LANTUS) 10 Unit(s) SubCutaneous at bedtime  insulin lispro (HumaLOG) corrective regimen sliding scale   SubCutaneous Before meals and at bedtime  insulin lispro Injectable (HumaLOG) 4 Unit(s) SubCutaneous three times a day before meals  metoprolol tartrate 25 milliGRAM(s) Oral every 12 hours  pantoprazole    Tablet 40 milliGRAM(s) Oral before breakfast  polyethylene glycol 3350 17 Gram(s) Oral two times a day  senna 2 Tablet(s) Oral at bedtime    IVF:  dextrose 5%. 1000 milliLiter(s) IV Continuous <Continuous>    CULTURES:    RADIOLOGY & ADDITIONAL TESTS:      ASSESSMENT:  72y Male s/p    M43.8X9  No pertinent family history in first degree relatives  Family history of coronary artery disease  Handoff  MEWS Score  PAD (peripheral artery disease)  Hypercholesterolemia  Hypertension  Sagittal plane imbalance  Sagittal plane imbalance  Costochondral chest pain  Ventricular tachycardia  DM I (diabetes mellitus, type I), uncontrolled  Hypertension  Hypercholesterolemia  PAD (peripheral artery disease)  Spinal fusion  H/O laminectomy  History of hernia repair  S/P hip replacement  S/P knee replacement, bilateral      PLAN:  NEURO:    CARDIOVASCULAR:    PULMONARY:    RENAL:    GI:    HEME:    ID:    ENDO:    DVT PROPHYLAXIS:  [] Venodynes                                [] Heparin/Lovenox    DISPOSITION: HPI:  73 y/o male pmhx HTN, HLD, spinal stenosis s/p lumbar laminectomy last year presents today for elective T10-pelvis fusin today. Patient reports chronic LBP and ambulates with cane assistance. He denies any falls but reports imbalance with ambulating. He denies any leg pain, weakness, numbness or tingling. (29 Jun 2018 07:19)    OVERNIGHT EVENTS: No acute events overnight.   Vital Signs Last 24 Hrs  T(C): 36.1 (09 Jul 2018 06:24), Max: 36.9 (08 Jul 2018 22:00)  T(F): 97 (09 Jul 2018 06:24), Max: 98.4 (08 Jul 2018 22:00)  HR: 71 (09 Jul 2018 06:24) (71 - 86)  BP: 150/68 (09 Jul 2018 06:24) (102/67 - 150/68)  BP(mean): --  RR: 16 (09 Jul 2018 06:24) (15 - 16)  SpO2: 96% (09 Jul 2018 06:24) (96% - 97%)    I&O's Summary    08 Jul 2018 07:01  -  09 Jul 2018 07:00  --------------------------------------------------------  IN: 0 mL / OUT: 750 mL / NET: -750 mL        PHYSICAL EXAM:  Neurological:  AxOx3  5/5 motor x4ext   sensory intact  Incision/Wound: incision C/D/I; Dressing by plastics in place,   KARON exit site dressing in place, saturated     TUBES/LINES:  [] Ivory  [] Lumbar Drain  [] Wound Drains  [] Others      DIET:  [] NPO  [x] Mechanical  [] Tube feeds    LABS:                        10.1   16.7  )-----------( 413      ( 08 Jul 2018 12:46 )             30.7     07-08    138  |  99  |  30<H>  ----------------------------<  93  4.6   |  26  |  1.03    Ca    8.8      08 Jul 2018 12:46    CAPILLARY BLOOD GLUCOSE  POCT Blood Glucose.: 115 mg/dL (09 Jul 2018 07:12)  POCT Blood Glucose.: 127 mg/dL (08 Jul 2018 21:25)  POCT Blood Glucose.: 152 mg/dL (08 Jul 2018 17:14)  POCT Blood Glucose.: 100 mg/dL (08 Jul 2018 11:49)    Drug Levels: [] N/A    CSF Analysis: [] N/A      Allergies    No Known Allergies    Intolerances      MEDICATIONS:  Antibiotics:  trimethoprim  160 mG/sulfamethoxazole 800 mG 1 Tablet(s) Oral two times a day    Neuro:  acetaminophen   Tablet 650 milliGRAM(s) Oral every 6 hours PRN  acetaminophen   Tablet. 650 milliGRAM(s) Oral every 6 hours PRN  ondansetron Injectable 4 milliGRAM(s) IV Push every 6 hours PRN  oxyCODONE    5 mG/acetaminophen 325 mG 1 Tablet(s) Oral every 4 hours PRN  oxyCODONE    5 mG/acetaminophen 325 mG 2 Tablet(s) Oral every 6 hours PRN  oxyCODONE  ER Tablet 10 milliGRAM(s) Oral every 12 hours    Anticoagulation:  enoxaparin Injectable 40 milliGRAM(s) SubCutaneous at bedtime    OTHER:  atorvastatin 80 milliGRAM(s) Oral at bedtime  bisacodyl Suppository 10 milliGRAM(s) Rectal daily PRN  BUpivacaine liposome 1.3% Injectable (no eMAR) 20 milliLiter(s) Local Injection once  dexamethasone     Tablet 2 milliGRAM(s) Oral every 6 hours  dextrose 40% Gel 15 Gram(s) Oral once PRN  dextrose 50% Injectable 12.5 Gram(s) IV Push once  dextrose 50% Injectable 25 Gram(s) IV Push once  dextrose 50% Injectable 25 Gram(s) IV Push once  docusate sodium 100 milliGRAM(s) Oral three times a day  glucagon  Injectable 1 milliGRAM(s) IntraMuscular once PRN  insulin glargine Injectable (LANTUS) 10 Unit(s) SubCutaneous at bedtime  insulin lispro (HumaLOG) corrective regimen sliding scale   SubCutaneous Before meals and at bedtime  insulin lispro Injectable (HumaLOG) 4 Unit(s) SubCutaneous three times a day before meals  metoprolol tartrate 25 milliGRAM(s) Oral every 12 hours  pantoprazole    Tablet 40 milliGRAM(s) Oral before breakfast  polyethylene glycol 3350 17 Gram(s) Oral two times a day  senna 2 Tablet(s) Oral at bedtime    IVF:  dextrose 5%. 1000 milliLiter(s) IV Continuous <Continuous>    CULTURES:    RADIOLOGY & ADDITIONAL TESTS:      ASSESSMENT:  72y Male s/p T10-pelvis posterior fusion 6/29    M43.8X9  No pertinent family history in first degree relatives  Family history of coronary artery disease  Handoff  MEWS Score  PAD (peripheral artery disease)  Hypercholesterolemia  Hypertension  Sagittal plane imbalance  Sagittal plane imbalance  Costochondral chest pain  Ventricular tachycardia  DM I (diabetes mellitus, type I), uncontrolled  Hypertension  Hypercholesterolemia  PAD (peripheral artery disease)  Spinal fusion  H/O laminectomy  History of hernia repair  S/P hip replacement  S/P knee replacement, bilateral      PLAN:  NEURO:  - neurocheck   - pain control prn   - decadron at 2 q 12 starting today  - (taper Plan: dose halved q 2 days until .5 qd x 2 days then dc )  - continue bowel regimen  -leukocytosis. Bactrim started per Dr. Moreno   -xray spine standing ordered  -encourage IS and OOB  - H&H stable   - patient is voiding   -awaiting rehab placement  -D/W   DVT PROPHYLAXIS:  [] Venodynes                                [] Heparin/Lovenox    DISPOSITION: HPI:  73 y/o male pmhx HTN, HLD, spinal stenosis s/p lumbar laminectomy last year presents today for elective T10-pelvis fusin today. Patient reports chronic LBP and ambulates with cane assistance. He denies any falls but reports imbalance with ambulating. He denies any leg pain, weakness, numbness or tingling. (29 Jun 2018 07:19)    OVERNIGHT EVENTS: No acute events overnight.   Vital Signs Last 24 Hrs  T(C): 36.1 (09 Jul 2018 06:24), Max: 36.9 (08 Jul 2018 22:00)  T(F): 97 (09 Jul 2018 06:24), Max: 98.4 (08 Jul 2018 22:00)  HR: 71 (09 Jul 2018 06:24) (71 - 86)  BP: 150/68 (09 Jul 2018 06:24) (102/67 - 150/68)  BP(mean): --  RR: 16 (09 Jul 2018 06:24) (15 - 16)  SpO2: 96% (09 Jul 2018 06:24) (96% - 97%)    I&O's Summary    08 Jul 2018 07:01  -  09 Jul 2018 07:00  --------------------------------------------------------  IN: 0 mL / OUT: 750 mL / NET: -750 mL        PHYSICAL EXAM:  Neurological:  AxOx3  5/5 motor x4ext   sensory intact  Incision/Wound: incision C/D/I; Dressing by plastics in place,   KARON exit site dressing in place, saturated     TUBES/LINES:  [] Ivory  [] Lumbar Drain  [] Wound Drains  [] Others      DIET:  [] NPO  [x] Mechanical  [] Tube feeds    LABS:                        10.1   16.7  )-----------( 413      ( 08 Jul 2018 12:46 )             30.7     07-08    138  |  99  |  30<H>  ----------------------------<  93  4.6   |  26  |  1.03    Ca    8.8      08 Jul 2018 12:46    CAPILLARY BLOOD GLUCOSE  POCT Blood Glucose.: 115 mg/dL (09 Jul 2018 07:12)  POCT Blood Glucose.: 127 mg/dL (08 Jul 2018 21:25)  POCT Blood Glucose.: 152 mg/dL (08 Jul 2018 17:14)  POCT Blood Glucose.: 100 mg/dL (08 Jul 2018 11:49)    Drug Levels: [] N/A    CSF Analysis: [] N/A      Allergies    No Known Allergies    Intolerances      MEDICATIONS:  Antibiotics:  trimethoprim  160 mG/sulfamethoxazole 800 mG 1 Tablet(s) Oral two times a day    Neuro:  acetaminophen   Tablet 650 milliGRAM(s) Oral every 6 hours PRN  acetaminophen   Tablet. 650 milliGRAM(s) Oral every 6 hours PRN  ondansetron Injectable 4 milliGRAM(s) IV Push every 6 hours PRN  oxyCODONE    5 mG/acetaminophen 325 mG 1 Tablet(s) Oral every 4 hours PRN  oxyCODONE    5 mG/acetaminophen 325 mG 2 Tablet(s) Oral every 6 hours PRN  oxyCODONE  ER Tablet 10 milliGRAM(s) Oral every 12 hours    Anticoagulation:  enoxaparin Injectable 40 milliGRAM(s) SubCutaneous at bedtime    OTHER:  atorvastatin 80 milliGRAM(s) Oral at bedtime  bisacodyl Suppository 10 milliGRAM(s) Rectal daily PRN  BUpivacaine liposome 1.3% Injectable (no eMAR) 20 milliLiter(s) Local Injection once  dexamethasone     Tablet 2 milliGRAM(s) Oral every 6 hours  dextrose 40% Gel 15 Gram(s) Oral once PRN  dextrose 50% Injectable 12.5 Gram(s) IV Push once  dextrose 50% Injectable 25 Gram(s) IV Push once  dextrose 50% Injectable 25 Gram(s) IV Push once  docusate sodium 100 milliGRAM(s) Oral three times a day  glucagon  Injectable 1 milliGRAM(s) IntraMuscular once PRN  insulin glargine Injectable (LANTUS) 10 Unit(s) SubCutaneous at bedtime  insulin lispro (HumaLOG) corrective regimen sliding scale   SubCutaneous Before meals and at bedtime  insulin lispro Injectable (HumaLOG) 4 Unit(s) SubCutaneous three times a day before meals  metoprolol tartrate 25 milliGRAM(s) Oral every 12 hours  pantoprazole    Tablet 40 milliGRAM(s) Oral before breakfast  polyethylene glycol 3350 17 Gram(s) Oral two times a day  senna 2 Tablet(s) Oral at bedtime    IVF:  dextrose 5%. 1000 milliLiter(s) IV Continuous <Continuous>    CULTURES:    RADIOLOGY & ADDITIONAL TESTS:      ASSESSMENT:  72y Male s/p T10-pelvis posterior fusion 6/29    M43.8X9  No pertinent family history in first degree relatives  Family history of coronary artery disease  Handoff  MEWS Score  PAD (peripheral artery disease)  Hypercholesterolemia  Hypertension  Sagittal plane imbalance  Sagittal plane imbalance  Costochondral chest pain  Ventricular tachycardia  DM I (diabetes mellitus, type I), uncontrolled  Hypertension  Hypercholesterolemia  PAD (peripheral artery disease)  Spinal fusion  H/O laminectomy  History of hernia repair  S/P hip replacement  S/P knee replacement, bilateral      PLAN:  NEURO:  - neurocheck   - pain control prn   - decadron at 2 q 12 starting today  - (taper Plan: dose halved q 2 days until .5 qd x 2 days then dc )  - continue bowel regimen  - leukocytosis. Bactrim started per Dr. Moreno   - xray spine standing Performed reviewed with Dr. Young  - encourage IS and OOB  - H&H stable   - patient is voiding   -awaiting rehab placement  -D/W   DVT PROPHYLAXIS:  [] Venodynes                                [] Heparin/Lovenox    DISPOSITION:

## 2018-07-09 NOTE — PROGRESS NOTE ADULT - SUBJECTIVE AND OBJECTIVE BOX
Chief Complaint/Reason for Consult: cv mgmt  INTERVAL HPI: tele reviewed, ectopy improving,  no palp no syncope  	  MEDICATIONS:  metoprolol tartrate 25 milliGRAM(s) Oral every 12 hours    trimethoprim  160 mG/sulfamethoxazole 800 mG 1 Tablet(s) Oral two times a day      acetaminophen   Tablet 650 milliGRAM(s) Oral every 6 hours PRN  acetaminophen   Tablet. 650 milliGRAM(s) Oral every 6 hours PRN  ondansetron Injectable 4 milliGRAM(s) IV Push every 6 hours PRN  oxyCODONE    5 mG/acetaminophen 325 mG 1 Tablet(s) Oral every 4 hours PRN  oxyCODONE    5 mG/acetaminophen 325 mG 2 Tablet(s) Oral every 6 hours PRN  oxyCODONE  ER Tablet 10 milliGRAM(s) Oral every 12 hours    bisacodyl Suppository 10 milliGRAM(s) Rectal daily PRN  docusate sodium 100 milliGRAM(s) Oral three times a day  pantoprazole    Tablet 40 milliGRAM(s) Oral before breakfast  polyethylene glycol 3350 17 Gram(s) Oral two times a day  senna 2 Tablet(s) Oral at bedtime  sodium biphosphate Rectal Enema 1 Enema Rectal once PRN    atorvastatin 80 milliGRAM(s) Oral at bedtime  dexamethasone     Tablet 2 milliGRAM(s) Oral every 12 hours  dextrose 40% Gel 15 Gram(s) Oral once PRN  dextrose 50% Injectable 12.5 Gram(s) IV Push once  dextrose 50% Injectable 25 Gram(s) IV Push once  dextrose 50% Injectable 25 Gram(s) IV Push once  glucagon  Injectable 1 milliGRAM(s) IntraMuscular once PRN  insulin glargine Injectable (LANTUS) 10 Unit(s) SubCutaneous at bedtime  insulin lispro (HumaLOG) corrective regimen sliding scale   SubCutaneous Before meals and at bedtime  insulin lispro Injectable (HumaLOG) 4 Unit(s) SubCutaneous three times a day before meals    dextrose 5%. 1000 milliLiter(s) IV Continuous <Continuous>  enoxaparin Injectable 40 milliGRAM(s) SubCutaneous at bedtime      REVIEW OF SYSTEMS:  [x] As per HPI  CONSTITUTIONAL: No fever, weight loss, or fatigue  RESPIRATORY: No cough, wheezing, chills or hemoptysis; No Shortness of Breath  CARDIOVASCULAR: No chest pain, palpitations, dizziness, or leg swelling  GASTROINTESTINAL: No abdominal or epigastric pain. No nausea, vomiting, or hematemesis; No diarrhea or constipation. No melena or hematochezia.  MUSCULOSKELETAL: No joint pain or swelling; No muscle, back, or extremity pain  [x] All others negative	  [ ] Unable to obtain    PHYSICAL EXAM:  T(C): 36.9 (07-09-18 @ 12:00), Max: 36.9 (07-08-18 @ 22:00)  HR: 91 (07-09-18 @ 12:00) (71 - 91)  BP: 109/69 (07-09-18 @ 12:00) (109/69 - 150/68)  RR: 15 (07-09-18 @ 12:00) (15 - 16)  SpO2: 98% (07-09-18 @ 12:00) (96% - 98%)  Wt(kg): --  I&O's Summary    08 Jul 2018 07:01  -  09 Jul 2018 07:00  --------------------------------------------------------  IN: 0 mL / OUT: 750 mL / NET: -750 mL          Appearance: Normal	  HEENT:   Normal oral mucosa  Cardiovascular: Normal S1 S2, No JVD, No murmurs, No edema  Respiratory: Lungs clear to auscultation	  Gastrointestinal:  Soft, Non-tender, + BS	  Extremities: Normal range of motion, No clubbing, cyanosis or edema  Vascular: Peripheral pulses palpable 2+ bilaterally    TELEMETRY: 	    ECG:   	  RADIOLOGY:   CXR:  CT:  US:    CARDIAC TESTING:  Echocardiogram:  Catheterization:  Stress Test:      LABS:	 	    CARDIAC MARKERS:                                  10.1   16.7  )-----------( 413      ( 08 Jul 2018 12:46 )             30.7     07-08    138  |  99  |  30<H>  ----------------------------<  93  4.6   |  26  |  1.03    Ca    8.8      08 Jul 2018 12:46      proBNP:   Lipid Profile:   HgA1c:   TSH:     ASSESSMENT/PLAN: 	    # Ectopy - improved, continue  Lopressor to 25 BID  2D echo - normal LVEF no RWMA normal pressures  clinically no s/s/ cardiac decompensation  replete electrolytes    #CAD - ekg non ischemic no cp sob     #HTN - pain control  titrate BB as per above  Goals SBP<130mmHg

## 2018-07-09 NOTE — PROGRESS NOTE ADULT - SUBJECTIVE AND OBJECTIVE BOX
Interval Events: Reviewed  Patient seen and examined at bedside.    Patient is a 72y old  Male who presents with a chief complaint of elective spine surgery (29 Jun 2018 07:19)    he is doing well  PAST MEDICAL & SURGICAL HISTORY:  PAD (peripheral artery disease)  Hypercholesterolemia  Hypertension  H/O laminectomy  History of hernia repair  S/P hip replacement: right hip  S/P knee replacement, bilateral      MEDICATIONS:  Pulmonary:    Antimicrobials:  trimethoprim  160 mG/sulfamethoxazole 800 mG 1 Tablet(s) Oral two times a day    Anticoagulants:  enoxaparin Injectable 40 milliGRAM(s) SubCutaneous at bedtime    Cardiac:  metoprolol tartrate 25 milliGRAM(s) Oral every 12 hours      Allergies    No Known Allergies    Intolerances        Vital Signs Last 24 Hrs  T(C): 36.8 (09 Jul 2018 20:30), Max: 36.9 (08 Jul 2018 22:00)  T(F): 98.3 (09 Jul 2018 20:30), Max: 98.5 (09 Jul 2018 18:00)  HR: 74 (09 Jul 2018 20:30) (71 - 91)  BP: 108/68 (09 Jul 2018 20:30) (100/64 - 150/68)  BP(mean): --  RR: 15 (09 Jul 2018 20:30) (15 - 16)  SpO2: 98% (09 Jul 2018 20:30) (96% - 98%)    07-08 @ 07:01  -  07-09 @ 07:00  --------------------------------------------------------  IN: 0 mL / OUT: 750 mL / NET: -750 mL          LABS:      CBC Full  -  ( 08 Jul 2018 12:46 )  WBC Count : 16.7 K/uL  Hemoglobin : 10.1 g/dL  Hematocrit : 30.7 %  Platelet Count - Automated : 413 K/uL  Mean Cell Volume : 93.9 fL  Mean Cell Hemoglobin : 30.9 pg  Mean Cell Hemoglobin Concentration : 32.9 g/dL  Auto Neutrophil # : x  Auto Lymphocyte # : x  Auto Monocyte # : x  Auto Eosinophil # : x  Auto Basophil # : x  Auto Neutrophil % : x  Auto Lymphocyte % : x  Auto Monocyte % : x  Auto Eosinophil % : x  Auto Basophil % : x    07-08    138  |  99  |  30<H>  ----------------------------<  93  4.6   |  26  |  1.03    Ca    8.8      08 Jul 2018 12:46                          RADIOLOGY & ADDITIONAL STUDIES (The following images were personally reviewed):  Ivory:                                     No  Urine output:                       adequate  DVT prophylaxis:                 Yes  Flattus:                                  Yes  Bowel movement:              No

## 2018-07-10 LAB
ANION GAP SERPL CALC-SCNC: 10 MMOL/L — SIGNIFICANT CHANGE UP (ref 5–17)
BUN SERPL-MCNC: 33 MG/DL — HIGH (ref 7–23)
CALCIUM SERPL-MCNC: 8.9 MG/DL — SIGNIFICANT CHANGE UP (ref 8.4–10.5)
CHLORIDE SERPL-SCNC: 99 MMOL/L — SIGNIFICANT CHANGE UP (ref 96–108)
CO2 SERPL-SCNC: 28 MMOL/L — SIGNIFICANT CHANGE UP (ref 22–31)
CREAT SERPL-MCNC: 1.22 MG/DL — SIGNIFICANT CHANGE UP (ref 0.5–1.3)
GLUCOSE BLDC GLUCOMTR-MCNC: 113 MG/DL — HIGH (ref 70–99)
GLUCOSE BLDC GLUCOMTR-MCNC: 117 MG/DL — HIGH (ref 70–99)
GLUCOSE BLDC GLUCOMTR-MCNC: 163 MG/DL — HIGH (ref 70–99)
GLUCOSE BLDC GLUCOMTR-MCNC: 171 MG/DL — HIGH (ref 70–99)
GLUCOSE SERPL-MCNC: 105 MG/DL — HIGH (ref 70–99)
HCT VFR BLD CALC: 29.7 % — LOW (ref 39–50)
HGB BLD-MCNC: 9.4 G/DL — LOW (ref 13–17)
MAGNESIUM SERPL-MCNC: 2 MG/DL — SIGNIFICANT CHANGE UP (ref 1.6–2.6)
MCHC RBC-ENTMCNC: 30.3 PG — SIGNIFICANT CHANGE UP (ref 27–34)
MCHC RBC-ENTMCNC: 31.6 G/DL — LOW (ref 32–36)
MCV RBC AUTO: 95.8 FL — SIGNIFICANT CHANGE UP (ref 80–100)
PHOSPHATE SERPL-MCNC: 3.8 MG/DL — SIGNIFICANT CHANGE UP (ref 2.5–4.5)
PLATELET # BLD AUTO: 355 K/UL — SIGNIFICANT CHANGE UP (ref 150–400)
POTASSIUM SERPL-MCNC: 4.8 MMOL/L — SIGNIFICANT CHANGE UP (ref 3.5–5.3)
POTASSIUM SERPL-SCNC: 4.8 MMOL/L — SIGNIFICANT CHANGE UP (ref 3.5–5.3)
RBC # BLD: 3.1 M/UL — LOW (ref 4.2–5.8)
RBC # FLD: 15.1 % — SIGNIFICANT CHANGE UP (ref 10.3–16.9)
SODIUM SERPL-SCNC: 137 MMOL/L — SIGNIFICANT CHANGE UP (ref 135–145)
WBC # BLD: 17.2 K/UL — HIGH (ref 3.8–10.5)
WBC # FLD AUTO: 17.2 K/UL — HIGH (ref 3.8–10.5)

## 2018-07-10 PROCEDURE — 99232 SBSQ HOSP IP/OBS MODERATE 35: CPT | Mod: GC

## 2018-07-10 PROCEDURE — 99232 SBSQ HOSP IP/OBS MODERATE 35: CPT

## 2018-07-10 RX ADMIN — INSULIN GLARGINE 10 UNIT(S): 100 INJECTION, SOLUTION SUBCUTANEOUS at 22:03

## 2018-07-10 RX ADMIN — ATORVASTATIN CALCIUM 80 MILLIGRAM(S): 80 TABLET, FILM COATED ORAL at 22:04

## 2018-07-10 RX ADMIN — OXYCODONE HYDROCHLORIDE 10 MILLIGRAM(S): 5 TABLET ORAL at 22:04

## 2018-07-10 RX ADMIN — POLYETHYLENE GLYCOL 3350 17 GRAM(S): 17 POWDER, FOR SOLUTION ORAL at 18:01

## 2018-07-10 RX ADMIN — Medication 100 MILLIGRAM(S): at 14:14

## 2018-07-10 RX ADMIN — ENOXAPARIN SODIUM 40 MILLIGRAM(S): 100 INJECTION SUBCUTANEOUS at 22:03

## 2018-07-10 RX ADMIN — Medication 2: at 22:03

## 2018-07-10 RX ADMIN — Medication 2 MILLIGRAM(S): at 07:26

## 2018-07-10 RX ADMIN — Medication 100 MILLIGRAM(S): at 07:25

## 2018-07-10 RX ADMIN — Medication 1 TABLET(S): at 07:26

## 2018-07-10 RX ADMIN — POLYETHYLENE GLYCOL 3350 17 GRAM(S): 17 POWDER, FOR SOLUTION ORAL at 07:25

## 2018-07-10 RX ADMIN — Medication 25 MILLIGRAM(S): at 18:01

## 2018-07-10 RX ADMIN — Medication 4 UNIT(S): at 18:02

## 2018-07-10 RX ADMIN — Medication 2 MILLIGRAM(S): at 18:01

## 2018-07-10 RX ADMIN — Medication 4 UNIT(S): at 12:38

## 2018-07-10 RX ADMIN — OXYCODONE HYDROCHLORIDE 10 MILLIGRAM(S): 5 TABLET ORAL at 10:39

## 2018-07-10 RX ADMIN — Medication 25 MILLIGRAM(S): at 07:26

## 2018-07-10 RX ADMIN — PANTOPRAZOLE SODIUM 40 MILLIGRAM(S): 20 TABLET, DELAYED RELEASE ORAL at 22:04

## 2018-07-10 RX ADMIN — Medication 4 UNIT(S): at 07:27

## 2018-07-10 RX ADMIN — OXYCODONE HYDROCHLORIDE 10 MILLIGRAM(S): 5 TABLET ORAL at 11:00

## 2018-07-10 RX ADMIN — SENNA PLUS 2 TABLET(S): 8.6 TABLET ORAL at 22:04

## 2018-07-10 RX ADMIN — Medication 100 MILLIGRAM(S): at 22:04

## 2018-07-10 RX ADMIN — OXYCODONE HYDROCHLORIDE 10 MILLIGRAM(S): 5 TABLET ORAL at 23:25

## 2018-07-10 NOTE — PROGRESS NOTE ADULT - SUBJECTIVE AND OBJECTIVE BOX
Chief Complaint/Reason for Consult: cv mgmt  INTERVAL HPI: tele reviewed, ectopy improving,  no palp no syncope  	  MEDICATIONS:  metoprolol tartrate 25 milliGRAM(s) Oral every 12 hours    trimethoprim  160 mG/sulfamethoxazole 800 mG 1 Tablet(s) Oral two times a day      acetaminophen   Tablet 650 milliGRAM(s) Oral every 6 hours PRN  acetaminophen   Tablet. 650 milliGRAM(s) Oral every 6 hours PRN  ondansetron Injectable 4 milliGRAM(s) IV Push every 6 hours PRN  oxyCODONE    5 mG/acetaminophen 325 mG 1 Tablet(s) Oral every 4 hours PRN  oxyCODONE    5 mG/acetaminophen 325 mG 2 Tablet(s) Oral every 6 hours PRN  oxyCODONE  ER Tablet 10 milliGRAM(s) Oral every 12 hours    bisacodyl Suppository 10 milliGRAM(s) Rectal daily PRN  docusate sodium 100 milliGRAM(s) Oral three times a day  pantoprazole    Tablet 40 milliGRAM(s) Oral before breakfast  polyethylene glycol 3350 17 Gram(s) Oral two times a day  senna 2 Tablet(s) Oral at bedtime  sodium biphosphate Rectal Enema 1 Enema Rectal once PRN    atorvastatin 80 milliGRAM(s) Oral at bedtime  dexamethasone     Tablet 2 milliGRAM(s) Oral every 12 hours  dextrose 40% Gel 15 Gram(s) Oral once PRN  dextrose 50% Injectable 12.5 Gram(s) IV Push once  dextrose 50% Injectable 25 Gram(s) IV Push once  dextrose 50% Injectable 25 Gram(s) IV Push once  glucagon  Injectable 1 milliGRAM(s) IntraMuscular once PRN  insulin glargine Injectable (LANTUS) 10 Unit(s) SubCutaneous at bedtime  insulin lispro (HumaLOG) corrective regimen sliding scale   SubCutaneous Before meals and at bedtime  insulin lispro Injectable (HumaLOG) 4 Unit(s) SubCutaneous three times a day before meals    dextrose 5%. 1000 milliLiter(s) IV Continuous <Continuous>  enoxaparin Injectable 40 milliGRAM(s) SubCutaneous at bedtime      REVIEW OF SYSTEMS:  [x] As per HPI  CONSTITUTIONAL: No fever, weight loss, or fatigue  RESPIRATORY: No cough, wheezing, chills or hemoptysis; No Shortness of Breath  CARDIOVASCULAR: No chest pain, palpitations, dizziness, or leg swelling  GASTROINTESTINAL: No abdominal or epigastric pain. No nausea, vomiting, or hematemesis; No diarrhea or constipation. No melena or hematochezia.  MUSCULOSKELETAL: No joint pain or swelling; No muscle, back, or extremity pain  [x] All others negative	  [ ] Unable to obtain    PHYSICAL EXAM:  T(C): 37 (07-10-18 @ 05:01), Max: 37 (07-10-18 @ 05:01)  HR: 74 (07-10-18 @ 05:01) (74 - 91)  BP: 139/75 (07-10-18 @ 05:01) (100/64 - 139/75)  RR: 16 (07-10-18 @ 05:01) (15 - 16)  SpO2: 97% (07-10-18 @ 05:01) (97% - 98%)  Wt(kg): --  I&O's Summary        Appearance: Normal	  HEENT:   Normal oral mucosa  Cardiovascular: Normal S1 S2, No JVD, No murmurs, No edema  Respiratory: Lungs clear to auscultation	  Gastrointestinal:  Soft, Non-tender, + BS	  Extremities: Normal range of motion, No clubbing, cyanosis or edema  Vascular: Peripheral pulses palpable 2+ bilaterally    TELEMETRY: 	    ECG:   	  RADIOLOGY:   CXR:  CT:  US:    CARDIAC TESTING:  Echocardiogram:  Catheterization:  Stress Test:      LABS:	 	    CARDIAC MARKERS:                                  9.4    17.2  )-----------( 355      ( 10 Jul 2018 07:02 )             29.7     07-10    137  |  99  |  33<H>  ----------------------------<  105<H>  4.8   |  28  |  1.22    Ca    8.9      10 Jul 2018 07:02  Phos  3.8     07-10  Mg     2.0     07-10      proBNP:   Lipid Profile:   HgA1c:   TSH:     ASSESSMENT/PLAN: 	    # Ectopy - improved, continue  Lopressor to 25 BID  2D echo - normal LVEF no RWMA normal pressures  clinically no s/s/ cardiac decompensation  replete electrolytes    #CAD - ekg non ischemic no cp sob     #HTN - pain control  titrate BB as per above  Goals SBP<130mmHg

## 2018-07-10 NOTE — PROGRESS NOTE ADULT - SUBJECTIVE AND OBJECTIVE BOX
HPI:  71 y/o male pmhx HTN, HLD, spinal stenosis s/p lumbar laminectomy last year presents today for elective T10-pelvis fusin today. Patient reports chronic LBP and ambulates with cane assistance. He denies any falls but reports imbalance with ambulating. He denies any leg pain, weakness, numbness or tingling. (29 Jun 2018 07:19)    OVERNIGHT EVENTS:  No acute events overnight. Started on Bactrim yesterday for leukocytosis. Trending WBCs. Standing Xrays done yesterday    Vital Signs Last 24 Hrs  T(C): 36.6 (10 Jul 2018 10:32), Max: 37 (10 Jul 2018 05:01)  T(F): 97.9 (10 Jul 2018 10:32), Max: 98.6 (10 Jul 2018 05:01)  HR: 79 (10 Jul 2018 10:32) (74 - 86)  BP: 105/69 (10 Jul 2018 10:32) (100/64 - 139/75)  BP(mean): --  RR: 15 (10 Jul 2018 10:32) (15 - 16)  SpO2: 97% (10 Jul 2018 10:32) (97% - 98%)    I&O's Summary    09 Jul 2018 07:01  -  10 Jul 2018 07:00  --------------------------------------------------------  IN: 0 mL / OUT: 600 mL / NET: -600 mL        PHYSICAL EXAM:  Neurological:  AAOx3, NAD, coherent speech, FC  CNII-XII grossly intact, PERRL, EOMI, face symmetric  MAEx4, strength 5/5 UE and LE b/l, no drift  SILT throughout  Incision/wound: back incision clean, dry and intact; dressing in place (per plastics)      TUBES/LINES:  [] Ivory  [] Lumbar Drain  [] Wound Drains  [] Others      DIET:  [] NPO  [x] Mechanical  [] Tube feeds    LABS:                        9.4    17.2  )-----------( 355      ( 10 Jul 2018 07:02 )             29.7     07-10    137  |  99  |  33<H>  ----------------------------<  105<H>  4.8   |  28  |  1.22    Ca    8.9      10 Jul 2018 07:02  Phos  3.8     07-10  Mg     2.0     07-10              CAPILLARY BLOOD GLUCOSE      POCT Blood Glucose.: 113 mg/dL (10 Jul 2018 11:57)  POCT Blood Glucose.: 117 mg/dL (10 Jul 2018 06:45)  POCT Blood Glucose.: 126 mg/dL (09 Jul 2018 22:02)  POCT Blood Glucose.: 112 mg/dL (09 Jul 2018 17:22)      Drug Levels: [] N/A    CSF Analysis: [] N/A      Allergies    No Known Allergies    Intolerances      MEDICATIONS:  Antibiotics:  trimethoprim  160 mG/sulfamethoxazole 800 mG 1 Tablet(s) Oral two times a day    Neuro:  acetaminophen   Tablet 650 milliGRAM(s) Oral every 6 hours PRN  acetaminophen   Tablet. 650 milliGRAM(s) Oral every 6 hours PRN  ondansetron Injectable 4 milliGRAM(s) IV Push every 6 hours PRN  oxyCODONE    5 mG/acetaminophen 325 mG 1 Tablet(s) Oral every 4 hours PRN  oxyCODONE    5 mG/acetaminophen 325 mG 2 Tablet(s) Oral every 6 hours PRN  oxyCODONE  ER Tablet 10 milliGRAM(s) Oral every 12 hours    Anticoagulation:  enoxaparin Injectable 40 milliGRAM(s) SubCutaneous at bedtime    OTHER:  atorvastatin 80 milliGRAM(s) Oral at bedtime  bisacodyl Suppository 10 milliGRAM(s) Rectal daily PRN  BUpivacaine liposome 1.3% Injectable (no eMAR) 20 milliLiter(s) Local Injection once  dexamethasone     Tablet 2 milliGRAM(s) Oral every 12 hours  dextrose 40% Gel 15 Gram(s) Oral once PRN  dextrose 50% Injectable 12.5 Gram(s) IV Push once  dextrose 50% Injectable 25 Gram(s) IV Push once  dextrose 50% Injectable 25 Gram(s) IV Push once  docusate sodium 100 milliGRAM(s) Oral three times a day  glucagon  Injectable 1 milliGRAM(s) IntraMuscular once PRN  insulin glargine Injectable (LANTUS) 10 Unit(s) SubCutaneous at bedtime  insulin lispro (HumaLOG) corrective regimen sliding scale   SubCutaneous Before meals and at bedtime  insulin lispro Injectable (HumaLOG) 4 Unit(s) SubCutaneous three times a day before meals  metoprolol tartrate 25 milliGRAM(s) Oral every 12 hours  pantoprazole    Tablet 40 milliGRAM(s) Oral before breakfast  polyethylene glycol 3350 17 Gram(s) Oral two times a day  senna 2 Tablet(s) Oral at bedtime  sodium biphosphate Rectal Enema 1 Enema Rectal once PRN    IVF:  dextrose 5%. 1000 milliLiter(s) IV Continuous <Continuous>    CULTURES:    RADIOLOGY & ADDITIONAL TESTS:      ASSESSMENT:  72y Male s/p T10-pelvis posterior fusion 6/29      M43.8X9  No pertinent family history in first degree relatives  Family history of coronary artery disease  Handoff  MEWS Score  PAD (peripheral artery disease)  Hypercholesterolemia  Hypertension  Sagittal plane imbalance  Sagittal plane imbalance  Leucocytosis  Costochondral chest pain  Ventricular tachycardia  DM I (diabetes mellitus, type I), uncontrolled  Hypertension  Hypercholesterolemia  PAD (peripheral artery disease)  Spinal fusion  H/O laminectomy  History of hernia repair  S/P hip replacement  S/P knee replacement, bilateral      PLAN:  -neuro/spine checks  -pain control  -taper decadron dose  -monitor wound/dressing  -brace at bedside  -standing xrays performed  -continue Bactrim- Dr. Moreno following- WBC increased today- no etiology  -cardiac diet  -bowel regimen  -GI/DVT ppx  -OOB/PT/OT  -dispo- GAIL when medically stable  -d/w Dr. Young

## 2018-07-10 NOTE — PROGRESS NOTE ADULT - SUBJECTIVE AND OBJECTIVE BOX
Interval Events: Reviewed  Patient seen and examined at bedside.    Patient is a 72y old  Male who presents with a chief complaint of elective spine surgery (29 Jun 2018 07:19)  Is doing okay. He had a bowel movement. The pain is controlled.  PAST MEDICAL & SURGICAL HISTORY:  PAD (peripheral artery disease)  Hypercholesterolemia  Hypertension  H/O laminectomy  History of hernia repair  S/P hip replacement: right hip  S/P knee replacement, bilateral      MEDICATIONS:  Pulmonary:    Antimicrobials:  trimethoprim  160 mG/sulfamethoxazole 800 mG 1 Tablet(s) Oral two times a day    Anticoagulants:  enoxaparin Injectable 40 milliGRAM(s) SubCutaneous at bedtime    Cardiac:  metoprolol tartrate 25 milliGRAM(s) Oral every 12 hours      Allergies    No Known Allergies    Intolerances        Vital Signs Last 24 Hrs  T(C): 36.6 (10 Jul 2018 10:32), Max: 37 (10 Jul 2018 05:01)  T(F): 97.9 (10 Jul 2018 10:32), Max: 98.6 (10 Jul 2018 05:01)  HR: 79 (10 Jul 2018 10:32) (74 - 86)  BP: 105/69 (10 Jul 2018 10:32) (100/64 - 139/75)  BP(mean): --  RR: 15 (10 Jul 2018 10:32) (15 - 16)  SpO2: 97% (10 Jul 2018 10:32) (97% - 98%)    07-09 @ 07:01  -  07-10 @ 07:00  --------------------------------------------------------  IN: 0 mL / OUT: 600 mL / NET: -600 mL          LABS:      CBC Full  -  ( 10 Jul 2018 07:02 )  WBC Count : 17.2 K/uL  Hemoglobin : 9.4 g/dL  Hematocrit : 29.7 %  Platelet Count - Automated : 355 K/uL  Mean Cell Volume : 95.8 fL  Mean Cell Hemoglobin : 30.3 pg  Mean Cell Hemoglobin Concentration : 31.6 g/dL  Auto Neutrophil # : x  Auto Lymphocyte # : x  Auto Monocyte # : x  Auto Eosinophil # : x  Auto Basophil # : x  Auto Neutrophil % : x  Auto Lymphocyte % : x  Auto Monocyte % : x  Auto Eosinophil % : x  Auto Basophil % : x    07-10    137  |  99  |  33<H>  ----------------------------<  105<H>  4.8   |  28  |  1.22    Ca    8.9      10 Jul 2018 07:02  Phos  3.8     07-10  Mg     2.0     07-10              < from: Xray Chest 1 View- PORTABLE-Urgent (07.06.18 @ 12:51) >  EXAM:  XR CHEST PORTABLE URGENT 1V                          PROCEDURE DATE:  07/06/2018          INTERPRETATION:  PORTABLE CHEST X-RAY     HISTORY: chest pain cough    PRIOR STUDIES: 7/5/2018    FINDINGS: The lungs are clear.  There are no pleural effusions.  The   cardiomediastinal silhouette is unremarkable. Partially visualized   thoracic/lumbar spinal hardware.    IMPRESSION:  The lungs are clear.    < end of copied text >              RADIOLOGY & ADDITIONAL STUDIES (The following images were personally reviewed):  Ivory:                                     No  Urine output:                       adequate  DVT prophylaxis:                 Yes  Flattus:                                  Yes  Bowel movement:              No

## 2018-07-10 NOTE — PROGRESS NOTE ADULT - ASSESSMENT
S/p paraspinous muscle closure for spinal surgery.    - Continue dressing care.  - Trend white count.  - Incision/surgical site does not appear to be inflamed or infected.

## 2018-07-10 NOTE — PROGRESS NOTE ADULT - SUBJECTIVE AND OBJECTIVE BOX
Doing well. No issues overnight. No complaint of incisional pain/tenderness. No urinary symptoms. No diarrhea. No SOB, chest pain, cough. Last WBC 16. Awaiting results for today.    Vital Signs Last 24 Hrs  T(C): 37 (10 Jul 2018 05:01), Max: 37 (10 Jul 2018 05:01)  T(F): 98.6 (10 Jul 2018 05:01), Max: 98.6 (10 Jul 2018 05:01)  HR: 74 (10 Jul 2018 05:01) (74 - 91)  BP: 139/75 (10 Jul 2018 05:01) (100/64 - 139/75)  BP(mean): --  RR: 16 (10 Jul 2018 05:01) (15 - 16)  SpO2: 97% (10 Jul 2018 05:01) (97% - 98%)    Back incision clean, dry, intact. No cellulitis or collections. No incisional breakdown or drainage.

## 2018-07-11 ENCOUNTER — TRANSCRIPTION ENCOUNTER (OUTPATIENT)
Age: 73
End: 2018-07-11

## 2018-07-11 VITALS
DIASTOLIC BLOOD PRESSURE: 70 MMHG | SYSTOLIC BLOOD PRESSURE: 120 MMHG | RESPIRATION RATE: 17 BRPM | HEART RATE: 70 BPM | TEMPERATURE: 98 F | OXYGEN SATURATION: 98 %

## 2018-07-11 LAB
ANION GAP SERPL CALC-SCNC: 14 MMOL/L — SIGNIFICANT CHANGE UP (ref 5–17)
BASOPHILS NFR BLD AUTO: 0.1 % — SIGNIFICANT CHANGE UP (ref 0–2)
BUN SERPL-MCNC: 30 MG/DL — HIGH (ref 7–23)
CALCIUM SERPL-MCNC: 9 MG/DL — SIGNIFICANT CHANGE UP (ref 8.4–10.5)
CHLORIDE SERPL-SCNC: 100 MMOL/L — SIGNIFICANT CHANGE UP (ref 96–108)
CO2 SERPL-SCNC: 24 MMOL/L — SIGNIFICANT CHANGE UP (ref 22–31)
CREAT SERPL-MCNC: 1.04 MG/DL — SIGNIFICANT CHANGE UP (ref 0.5–1.3)
EOSINOPHIL NFR BLD AUTO: 0.6 % — SIGNIFICANT CHANGE UP (ref 0–6)
GLUCOSE BLDC GLUCOMTR-MCNC: 115 MG/DL — HIGH (ref 70–99)
GLUCOSE BLDC GLUCOMTR-MCNC: 120 MG/DL — HIGH (ref 70–99)
GLUCOSE SERPL-MCNC: 107 MG/DL — HIGH (ref 70–99)
HCT VFR BLD CALC: 31.3 % — LOW (ref 39–50)
HGB BLD-MCNC: 9.9 G/DL — LOW (ref 13–17)
LYMPHOCYTES # BLD AUTO: 12.1 % — LOW (ref 13–44)
MAGNESIUM SERPL-MCNC: 2 MG/DL — SIGNIFICANT CHANGE UP (ref 1.6–2.6)
MCHC RBC-ENTMCNC: 30.1 PG — SIGNIFICANT CHANGE UP (ref 27–34)
MCHC RBC-ENTMCNC: 31.6 G/DL — LOW (ref 32–36)
MCV RBC AUTO: 95.1 FL — SIGNIFICANT CHANGE UP (ref 80–100)
MONOCYTES NFR BLD AUTO: 6 % — SIGNIFICANT CHANGE UP (ref 2–14)
NEUTROPHILS NFR BLD AUTO: 81.2 % — HIGH (ref 43–77)
PHOSPHATE SERPL-MCNC: 3.7 MG/DL — SIGNIFICANT CHANGE UP (ref 2.5–4.5)
PLATELET # BLD AUTO: 365 K/UL — SIGNIFICANT CHANGE UP (ref 150–400)
POTASSIUM SERPL-MCNC: 5 MMOL/L — SIGNIFICANT CHANGE UP (ref 3.5–5.3)
POTASSIUM SERPL-SCNC: 5 MMOL/L — SIGNIFICANT CHANGE UP (ref 3.5–5.3)
RBC # BLD: 3.29 M/UL — LOW (ref 4.2–5.8)
RBC # FLD: 14.9 % — SIGNIFICANT CHANGE UP (ref 10.3–16.9)
SODIUM SERPL-SCNC: 138 MMOL/L — SIGNIFICANT CHANGE UP (ref 135–145)
WBC # BLD: 17.5 K/UL — HIGH (ref 3.8–10.5)
WBC # FLD AUTO: 17.5 K/UL — HIGH (ref 3.8–10.5)

## 2018-07-11 PROCEDURE — 76000 FLUOROSCOPY <1 HR PHYS/QHP: CPT

## 2018-07-11 PROCEDURE — 72131 CT LUMBAR SPINE W/O DYE: CPT

## 2018-07-11 PROCEDURE — P9016: CPT

## 2018-07-11 PROCEDURE — P9045: CPT

## 2018-07-11 PROCEDURE — 85018 HEMOGLOBIN: CPT

## 2018-07-11 PROCEDURE — 99232 SBSQ HOSP IP/OBS MODERATE 35: CPT

## 2018-07-11 PROCEDURE — 93970 EXTREMITY STUDY: CPT

## 2018-07-11 PROCEDURE — 72070 X-RAY EXAM THORAC SPINE 2VWS: CPT

## 2018-07-11 PROCEDURE — 94002 VENT MGMT INPAT INIT DAY: CPT

## 2018-07-11 PROCEDURE — 85027 COMPLETE CBC AUTOMATED: CPT

## 2018-07-11 PROCEDURE — 80048 BASIC METABOLIC PNL TOTAL CA: CPT

## 2018-07-11 PROCEDURE — 36415 COLL VENOUS BLD VENIPUNCTURE: CPT

## 2018-07-11 PROCEDURE — C8929: CPT

## 2018-07-11 PROCEDURE — C1889: CPT

## 2018-07-11 PROCEDURE — C1713: CPT

## 2018-07-11 PROCEDURE — 72100 X-RAY EXAM L-S SPINE 2/3 VWS: CPT

## 2018-07-11 PROCEDURE — 97162 PT EVAL MOD COMPLEX 30 MIN: CPT

## 2018-07-11 PROCEDURE — 81001 URINALYSIS AUTO W/SCOPE: CPT

## 2018-07-11 PROCEDURE — 80053 COMPREHEN METABOLIC PANEL: CPT

## 2018-07-11 PROCEDURE — 82803 BLOOD GASES ANY COMBINATION: CPT

## 2018-07-11 PROCEDURE — 80076 HEPATIC FUNCTION PANEL: CPT

## 2018-07-11 PROCEDURE — 99232 SBSQ HOSP IP/OBS MODERATE 35: CPT | Mod: GC

## 2018-07-11 PROCEDURE — 83036 HEMOGLOBIN GLYCOSYLATED A1C: CPT

## 2018-07-11 PROCEDURE — 97161 PT EVAL LOW COMPLEX 20 MIN: CPT

## 2018-07-11 PROCEDURE — 71045 X-RAY EXAM CHEST 1 VIEW: CPT

## 2018-07-11 PROCEDURE — 36430 TRANSFUSION BLD/BLD COMPNT: CPT

## 2018-07-11 PROCEDURE — 86923 COMPATIBILITY TEST ELECTRIC: CPT

## 2018-07-11 PROCEDURE — 82962 GLUCOSE BLOOD TEST: CPT

## 2018-07-11 PROCEDURE — 85025 COMPLETE CBC W/AUTO DIFF WBC: CPT

## 2018-07-11 PROCEDURE — 86901 BLOOD TYPING SEROLOGIC RH(D): CPT

## 2018-07-11 PROCEDURE — 84295 ASSAY OF SERUM SODIUM: CPT

## 2018-07-11 PROCEDURE — 86850 RBC ANTIBODY SCREEN: CPT

## 2018-07-11 PROCEDURE — 84132 ASSAY OF SERUM POTASSIUM: CPT

## 2018-07-11 PROCEDURE — 86900 BLOOD TYPING SEROLOGIC ABO: CPT

## 2018-07-11 PROCEDURE — 83735 ASSAY OF MAGNESIUM: CPT

## 2018-07-11 PROCEDURE — 95940 IONM IN OPERATNG ROOM 15 MIN: CPT

## 2018-07-11 PROCEDURE — 82330 ASSAY OF CALCIUM: CPT

## 2018-07-11 PROCEDURE — 72192 CT PELVIS W/O DYE: CPT

## 2018-07-11 PROCEDURE — 97116 GAIT TRAINING THERAPY: CPT

## 2018-07-11 PROCEDURE — 84100 ASSAY OF PHOSPHORUS: CPT

## 2018-07-11 RX ORDER — INSULIN GLARGINE 100 [IU]/ML
0 INJECTION, SOLUTION SUBCUTANEOUS
Qty: 0 | Refills: 0 | COMMUNITY
Start: 2018-07-11

## 2018-07-11 RX ORDER — DOCUSATE SODIUM 100 MG
1 CAPSULE ORAL
Qty: 0 | Refills: 0 | COMMUNITY
Start: 2018-07-11

## 2018-07-11 RX ORDER — METOPROLOL TARTRATE 50 MG
1 TABLET ORAL
Qty: 0 | Refills: 0 | DISCHARGE
Start: 2018-07-11

## 2018-07-11 RX ORDER — OXYCODONE HYDROCHLORIDE 5 MG/1
1 TABLET ORAL
Qty: 0 | Refills: 0 | COMMUNITY
Start: 2018-07-11

## 2018-07-11 RX ORDER — SENNA PLUS 8.6 MG/1
2 TABLET ORAL
Qty: 0 | Refills: 0 | COMMUNITY
Start: 2018-07-11

## 2018-07-11 RX ORDER — POLYETHYLENE GLYCOL 3350 17 G/17G
17 POWDER, FOR SOLUTION ORAL
Qty: 0 | Refills: 0 | COMMUNITY
Start: 2018-07-11

## 2018-07-11 RX ORDER — PANTOPRAZOLE SODIUM 20 MG/1
1 TABLET, DELAYED RELEASE ORAL
Qty: 0 | Refills: 0 | COMMUNITY
Start: 2018-07-11

## 2018-07-11 RX ORDER — ENOXAPARIN SODIUM 100 MG/ML
0 INJECTION SUBCUTANEOUS
Qty: 0 | Refills: 0 | COMMUNITY
Start: 2018-07-11

## 2018-07-11 RX ORDER — DEXAMETHASONE 0.5 MG/5ML
1 ELIXIR ORAL
Qty: 0 | Refills: 0 | COMMUNITY
Start: 2018-07-11

## 2018-07-11 RX ADMIN — Medication 25 MILLIGRAM(S): at 05:30

## 2018-07-11 RX ADMIN — OXYCODONE HYDROCHLORIDE 10 MILLIGRAM(S): 5 TABLET ORAL at 12:19

## 2018-07-11 RX ADMIN — Medication 100 MILLIGRAM(S): at 05:30

## 2018-07-11 RX ADMIN — Medication 4 UNIT(S): at 12:19

## 2018-07-11 RX ADMIN — Medication 2 MILLIGRAM(S): at 05:30

## 2018-07-11 RX ADMIN — OXYCODONE HYDROCHLORIDE 10 MILLIGRAM(S): 5 TABLET ORAL at 12:55

## 2018-07-11 RX ADMIN — Medication 4 UNIT(S): at 06:57

## 2018-07-11 NOTE — PROGRESS NOTE ADULT - PROBLEM SELECTOR PLAN 7
started on bactrim and follow on wbc which I ordered
started on bactrim and Her white count is still elevated. There's no clinical evidence of infection at this point. Vision is empirically on Bactrim. The wound is stable. No phlebitis. No diarrhea. And chest x-rays is clear
started on bactrim and Her white count is still elevated. There's no clinical evidence of infection at this point. Vision is empirically on Bactrim. The wound is stable. No phlebitis. No diarrhea. And chest x-rays is clear.  The patient was seen by infectious disease and the recommendation is to taper to steroids and followup on the leukocytosis.  discontinue Bactum

## 2018-07-11 NOTE — PROGRESS NOTE ADULT - PROBLEM SELECTOR PROBLEM 1
PAD (peripheral artery disease)

## 2018-07-11 NOTE — PROGRESS NOTE ADULT - SUBJECTIVE AND OBJECTIVE BOX
Chief Complaint/Reason for Consult: cv mgmt  INTERVAL HPI: tele reviewed, ectopy improving,  no palp no syncope    	  MEDICATIONS:  metoprolol tartrate 25 milliGRAM(s) Oral every 12 hours        acetaminophen   Tablet 650 milliGRAM(s) Oral every 6 hours PRN  acetaminophen   Tablet. 650 milliGRAM(s) Oral every 6 hours PRN  ondansetron Injectable 4 milliGRAM(s) IV Push every 6 hours PRN  oxyCODONE    5 mG/acetaminophen 325 mG 1 Tablet(s) Oral every 4 hours PRN  oxyCODONE    5 mG/acetaminophen 325 mG 2 Tablet(s) Oral every 6 hours PRN  oxyCODONE  ER Tablet 10 milliGRAM(s) Oral every 12 hours    bisacodyl Suppository 10 milliGRAM(s) Rectal daily PRN  docusate sodium 100 milliGRAM(s) Oral three times a day  pantoprazole    Tablet 40 milliGRAM(s) Oral before breakfast  polyethylene glycol 3350 17 Gram(s) Oral two times a day  senna 2 Tablet(s) Oral at bedtime  sodium biphosphate Rectal Enema 1 Enema Rectal once PRN    atorvastatin 80 milliGRAM(s) Oral at bedtime  dexamethasone     Tablet 2 milliGRAM(s) Oral every 12 hours  dextrose 40% Gel 15 Gram(s) Oral once PRN  dextrose 50% Injectable 12.5 Gram(s) IV Push once  dextrose 50% Injectable 25 Gram(s) IV Push once  dextrose 50% Injectable 25 Gram(s) IV Push once  glucagon  Injectable 1 milliGRAM(s) IntraMuscular once PRN  insulin glargine Injectable (LANTUS) 10 Unit(s) SubCutaneous at bedtime  insulin lispro (HumaLOG) corrective regimen sliding scale   SubCutaneous Before meals and at bedtime  insulin lispro Injectable (HumaLOG) 4 Unit(s) SubCutaneous three times a day before meals    dextrose 5%. 1000 milliLiter(s) IV Continuous <Continuous>  enoxaparin Injectable 40 milliGRAM(s) SubCutaneous at bedtime      REVIEW OF SYSTEMS:  [x] As per HPI  CONSTITUTIONAL: No fever, weight loss, or fatigue  RESPIRATORY: No cough, wheezing, chills or hemoptysis; No Shortness of Breath  CARDIOVASCULAR: No chest pain, palpitations, dizziness, or leg swelling  GASTROINTESTINAL: No abdominal or epigastric pain. No nausea, vomiting, or hematemesis; No diarrhea or constipation. No melena or hematochezia.  MUSCULOSKELETAL: No joint pain or swelling; No muscle, back, or extremity pain  [x] All others negative	  [ ] Unable to obtain    PHYSICAL EXAM:  T(C): 36.4 (07-11-18 @ 08:44), Max: 36.4 (07-10-18 @ 18:04)  HR: 65 (07-11-18 @ 08:44) (65 - 85)  BP: 116/55 (07-11-18 @ 08:44) (102/69 - 132/70)  RR: 15 (07-11-18 @ 08:44) (15 - 20)  SpO2: 96% (07-11-18 @ 08:44) (96% - 98%)  Wt(kg): --  I&O's Summary    10 Jul 2018 07:01  -  11 Jul 2018 07:00  --------------------------------------------------------  IN: 0 mL / OUT: 700 mL / NET: -700 mL          Appearance: Normal	  HEENT:   Normal oral mucosa  Cardiovascular: Normal S1 S2, No JVD, No murmurs, No edema  Respiratory: Lungs clear to auscultation	  Gastrointestinal:  Soft, Non-tender, + BS	  Extremities: Normal range of motion, No clubbing, cyanosis or edema  Vascular: Peripheral pulses palpable 2+ bilaterally    TELEMETRY: 	    ECG:   	  RADIOLOGY:   CXR:  CT:  US:    CARDIAC TESTING:  Echocardiogram:  Catheterization:  Stress Test:      LABS:	 	    CARDIAC MARKERS:                                  9.9    17.5  )-----------( 365      ( 11 Jul 2018 07:29 )             31.3     07-11    138  |  100  |  30<H>  ----------------------------<  107<H>  5.0   |  24  |  1.04    Ca    9.0      11 Jul 2018 07:29  Phos  3.7     07-11  Mg     2.0     07-11      proBNP:   Lipid Profile:   HgA1c:   TSH:     ASSESSMENT/PLAN: 	    # Ectopy - improved, continue  Lopressor to 25 BID  2D echo - normal LVEF no RWMA normal pressures  clinically no s/s/ cardiac decompensation  replete electrolytes    #CAD - ekg non ischemic no cp sob     #HTN - pain control  titrate BB as per above  Goals SBP<130mmHg

## 2018-07-11 NOTE — DISCHARGE NOTE ADULT - NS AS ACTIVITY OBS
No Heavy lifting/straining/Do not drive or operate machinery/Walking-Indoors allowed/Showering allowed

## 2018-07-11 NOTE — PROGRESS NOTE ADULT - PROBLEM SELECTOR PLAN 1
Patient is stable and has no claudication. Is able to ambulate in the room.

## 2018-07-11 NOTE — PROGRESS NOTE ADULT - PROBLEM SELECTOR PLAN 4
The blood sugar is better controlled and continue Lantus and insulin sliding scale. Maintain blood sugar in the range 140/180 and not below 70.  continue current regimen
The blood sugar is better controlled and continue Lantus and insulin sliding scale. Maintain blood sugar in the range 140/180 and not below 70.  misty villanueva
The blood sugar is controlled and continue Lantus and insulin sliding scale. Minting blood sugar in the range 140/180 and not below 70
The blood sugar is better controlled and continue Lantus and insulin sliding scale. Maintain blood sugar in the range 140/180 and not below 70.  continue current regimen
The blood sugar is better controlled and continue Lantus and insulin sliding scale. Maintain blood sugar in the range 140/180 and not below 70.  continue current regimen
The blood sugar is controlled and continue Lantus and insulin sliding scale. Minting blood sugar in the range 140/180 and not below 70
The blood sugar is uncontrolled and continue Lantus and insulin sliding scale. Maintain blood sugar in the range 140/180 and not below 70.  I will increased the three meals Insulin

## 2018-07-11 NOTE — DISCHARGE NOTE ADULT - HOSPITAL COURSE
72M underwent elective T10-pelvis spinal fixation.  Extubated on POD#1 without issues.  Postoperative course complicated by acute blood loss anemia, requiring PRBC transfusion with good effect.  Also episodes of cardiac ectopic rhythms: cardiology consulted and started lopressor with good effect.  Medicine consultation obtained to assist with management of patient.  Was on decadron postoperatively, which resulted in persistently elevated blood sugars despite tapering dosages.  Started on insulin regimen (basal and nutritional).  Leukocytosis postop, which continued to trend upward until day of discharge.  Initially started on Bactrim empirically, ultimately discontinued due to lack of source.  Infectious disease consultation obtained, who agreed that there was no definitive source of infection and that leukocytosis likely due to steroid use.  Seen by physical therapy postoperatively, who recommended subacute rehab.  Wound closure performed by plastic surgery service, who recommends dressing changes every other day.

## 2018-07-11 NOTE — DISCHARGE NOTE ADULT - CARE PROVIDERS DIRECT ADDRESSES
,[email or web address]

## 2018-07-11 NOTE — PROGRESS NOTE ADULT - ATTENDING COMMENTS
Patient seen and examined with house-staff during bedside rounds.  Resident note read, including vitals, physical findings, laboratory data, and radiological reports.   Revisions included below.  Direct personal management at bed side and extensive interpretation of the data.  Plan was outlined and discussed in details with the housestaff.  Decision making of high complexity  Action taken for acute disease activity to reflect the level of care provided:  - medication reconciliation  - review laboratory data

## 2018-07-11 NOTE — PROGRESS NOTE ADULT - SUBJECTIVE AND OBJECTIVE BOX
SUBJECTIVE:  No overnight events.   Pt still has elevated white count.    OBJECTIVE:     ** VITAL SIGNS / I&O's **    Vital Signs Last 24 Hrs  T(C): 36.4 (11 Jul 2018 05:03), Max: 36.6 (10 Jul 2018 10:32)  T(F): 97.5 (11 Jul 2018 05:03), Max: 97.9 (10 Jul 2018 10:32)  HR: 66 (11 Jul 2018 05:03) (66 - 85)  BP: 132/70 (11 Jul 2018 05:03) (102/69 - 132/70)  BP(mean): --  RR: 17 (11 Jul 2018 05:03) (15 - 20)  SpO2: 98% (11 Jul 2018 05:03) (97% - 98%)      10 Jul 2018 07:01  -  11 Jul 2018 07:00  --------------------------------------------------------  IN:  Total IN: 0 mL    OUT:    Voided: 700 mL  Total OUT: 700 mL    Total NET: -700 mL          ** PHYSICAL EXAM **    -- CONSTITUTIONAL: Alert, Awake. NAD.   -- RESPIRATORY: unlabored breathing, no respiratory distress  -- BACK: dressing clean, dry, intact, no collections or surrounding swelling      ** LABS **                          9.4    17.2  )-----------( 355      ( 10 Jul 2018 07:02 )             29.7     10 Jul 2018 07:02    137    |  99     |  33     ----------------------------<  105    4.8     |  28     |  1.22     Ca    8.9        10 Jul 2018 07:02  Phos  3.8       10 Jul 2018 07:02  Mg     2.0       10 Jul 2018 07:02        CAPILLARY BLOOD GLUCOSE      POCT Blood Glucose.: 120 mg/dL (11 Jul 2018 05:57)  POCT Blood Glucose.: 163 mg/dL (10 Jul 2018 21:20)  POCT Blood Glucose.: 171 mg/dL (10 Jul 2018 17:00)  POCT Blood Glucose.: 113 mg/dL (10 Jul 2018 11:57)

## 2018-07-11 NOTE — PROGRESS NOTE ADULT - PROBLEM SELECTOR PROBLEM 2
Hypercholesterolemia

## 2018-07-11 NOTE — PROGRESS NOTE ADULT - PROVIDER SPECIALTY LIST ADULT
Cardiology
Cardiology
Internal Medicine
Internal Medicine
NSICU
NSICU
Neurosurgery
Plastic Surgery
Cardiology
Neurosurgery
Neurosurgery
NSICU
Cardiology
Cardiology
Internal Medicine

## 2018-07-11 NOTE — PROGRESS NOTE ADULT - ASSESSMENT
71 y/o male s/p spinal fusion and PRS closure  - consider chest x-ray  - dressing to be changed tomorrow  - care per primary team

## 2018-07-11 NOTE — CONSULT NOTE ADULT - SUBJECTIVE AND OBJECTIVE BOX
HPI:  71 y/o male pmhx HTN, HLD, spinal stenosis s/p lumbar laminectomy last year presenting for T10 pelvis spinal fusion.       PAST MEDICAL & SURGICAL HISTORY:  PAD (peripheral artery disease)  Hypercholesterolemia  Hypertension  H/O laminectomy  History of hernia repair  S/P hip replacement: right hip  S/P knee replacement, bilateral        REVIEW OF SYSTEMS:    CONSTITUTIONAL: No weakness, fevers or chills  EYES/ENT: No visual changes;  No vertigo or throat pain   NECK: No pain or stiffness  RESPIRATORY: No cough, wheezing, hemoptysis; No shortness of breath  CARDIOVASCULAR: No chest pain or palpitations  GASTROINTESTINAL: No abdominal or epigastric pain. No nausea, vomiting, or hematemesis; No diarrhea or constipation. No melena or hematochezia.  GENITOURINARY: No dysuria, frequency or hematuria  NEUROLOGICAL: No numbness or weakness  SKIN: No itching, burning, rashes, or lesions   MSK: no joint pain, no joint swelling  All other review of systems is negative unless indicated above.      MEDICATIONS  (STANDING):  atorvastatin 80 milliGRAM(s) Oral at bedtime  BUpivacaine liposome 1.3% Injectable (no eMAR) 20 milliLiter(s) Local Injection once  dexamethasone     Tablet 2 milliGRAM(s) Oral every 12 hours  dextrose 5%. 1000 milliLiter(s) (50 mL/Hr) IV Continuous <Continuous>  dextrose 50% Injectable 12.5 Gram(s) IV Push once  dextrose 50% Injectable 25 Gram(s) IV Push once  dextrose 50% Injectable 25 Gram(s) IV Push once  docusate sodium 100 milliGRAM(s) Oral three times a day  enoxaparin Injectable 40 milliGRAM(s) SubCutaneous at bedtime  insulin glargine Injectable (LANTUS) 10 Unit(s) SubCutaneous at bedtime  insulin lispro (HumaLOG) corrective regimen sliding scale   SubCutaneous Before meals and at bedtime  insulin lispro Injectable (HumaLOG) 4 Unit(s) SubCutaneous three times a day before meals  metoprolol tartrate 25 milliGRAM(s) Oral every 12 hours  oxyCODONE  ER Tablet 10 milliGRAM(s) Oral every 12 hours  pantoprazole    Tablet 40 milliGRAM(s) Oral before breakfast  polyethylene glycol 3350 17 Gram(s) Oral two times a day  senna 2 Tablet(s) Oral at bedtime    MEDICATIONS  (PRN):  acetaminophen   Tablet 650 milliGRAM(s) Oral every 6 hours PRN For Temp greater than 38 C (100.4 F)  acetaminophen   Tablet. 650 milliGRAM(s) Oral every 6 hours PRN Mild Pain (1 - 3)  bisacodyl Suppository 10 milliGRAM(s) Rectal daily PRN Constipation  dextrose 40% Gel 15 Gram(s) Oral once PRN Blood Glucose LESS THAN 70 milliGRAM(s)/deciliter  glucagon  Injectable 1 milliGRAM(s) IntraMuscular once PRN Glucose LESS THAN 70 milligrams/deciliter  ondansetron Injectable 4 milliGRAM(s) IV Push every 6 hours PRN Nausea  oxyCODONE    5 mG/acetaminophen 325 mG 1 Tablet(s) Oral every 4 hours PRN Moderate Pain  oxyCODONE    5 mG/acetaminophen 325 mG 2 Tablet(s) Oral every 6 hours PRN Severe Pain  sodium biphosphate Rectal Enema 1 Enema Rectal once PRN constipation      Allergies    No Known Allergies    Intolerances        SOCIAL HISTORY:    FAMILY HISTORY:  No pertinent family history in first degree relatives      Vital Signs Last 24 Hrs  T(C): 36.4 (11 Jul 2018 08:44), Max: 36.6 (10 Jul 2018 10:32)  T(F): 97.6 (11 Jul 2018 08:44), Max: 97.9 (10 Jul 2018 10:32)  HR: 65 (11 Jul 2018 08:44) (65 - 85)  BP: 116/55 (11 Jul 2018 08:44) (102/69 - 132/70)  BP(mean): --  RR: 15 (11 Jul 2018 08:44) (15 - 20)  SpO2: 96% (11 Jul 2018 08:44) (96% - 98%)      PHYSICAL EXAM:    Constitutional: WDWN resting comfortably in bed; NAD  Head: NC/AT  Eyes: PERRLA, EOMI, clear conjunctiva  ENT: no nasal discharge; no oropharyngeal erythema or exudates; dry oral mucosa  Neck: supple; no JVD or thyromegaly  Respiratory: CTA B/L; no W/R/R, no retractions  Cardiac: +S1/S2; RRR; no M/R/G; PMI non-displaced  Gastrointestinal: soft, NT/ND; no rebound or guarding; +BS, no hepatosplenomegaly  Extremities: WWP, no clubbing or cyanosis; no peripheral edema  Vascular: 2+ radial, DP/PT pulses B/L  Lymphatic: no submandibular or cervical LAD  Skin: no rash, no ulcers  Neurologic: AAOx3; answers questions appropriately, follows commands, moves all extremities, CNII-XII grossly intact; no focal deficits, motor 5/5 in UE and LE, Reflexes 2+ in UE and LE b/l      LABS:                        9.9    17.5  )-----------( 365      ( 11 Jul 2018 07:29 )             31.3     07-11    138  |  100  |  30<H>  ----------------------------<  107<H>  5.0   |  24  |  1.04    Ca    9.0      11 Jul 2018 07:29  Phos  3.7     07-11  Mg     2.0     07-11            RADIOLOGY & ADDITIONAL STUDIES: HPI:  71 y/o male pmhx HTN, HLD, spinal stenosis s/p lumbar laminectomy last year presenting for T10 pelvis spinal fusion.       PAST MEDICAL & SURGICAL HISTORY:  PAD (peripheral artery disease)  Hypercholesterolemia  Hypertension  H/O laminectomy  History of hernia repair  S/P hip replacement: right hip  S/P knee replacement, bilateral        REVIEW OF SYSTEMS:    CONSTITUTIONAL: No weakness, fevers or chills  EYES/ENT: No visual changes;  No vertigo or throat pain   NECK: No pain or stiffness  RESPIRATORY: No cough, wheezing, hemoptysis; No shortness of breath  CARDIOVASCULAR: No chest pain or palpitations  GASTROINTESTINAL: No abdominal or epigastric pain. No nausea, vomiting, or hematemesis; No diarrhea or constipation. No melena or hematochezia.  GENITOURINARY: No dysuria, frequency or hematuria  NEUROLOGICAL: No numbness or weakness  SKIN: No itching, burning, rashes, or lesions   MSK: no joint pain, no joint swelling  All other review of systems is negative unless indicated above.      MEDICATIONS  (STANDING):  atorvastatin 80 milliGRAM(s) Oral at bedtime  BUpivacaine liposome 1.3% Injectable (no eMAR) 20 milliLiter(s) Local Injection once  dexamethasone     Tablet 2 milliGRAM(s) Oral every 12 hours  dextrose 5%. 1000 milliLiter(s) (50 mL/Hr) IV Continuous <Continuous>  dextrose 50% Injectable 12.5 Gram(s) IV Push once  dextrose 50% Injectable 25 Gram(s) IV Push once  dextrose 50% Injectable 25 Gram(s) IV Push once  docusate sodium 100 milliGRAM(s) Oral three times a day  enoxaparin Injectable 40 milliGRAM(s) SubCutaneous at bedtime  insulin glargine Injectable (LANTUS) 10 Unit(s) SubCutaneous at bedtime  insulin lispro (HumaLOG) corrective regimen sliding scale   SubCutaneous Before meals and at bedtime  insulin lispro Injectable (HumaLOG) 4 Unit(s) SubCutaneous three times a day before meals  metoprolol tartrate 25 milliGRAM(s) Oral every 12 hours  oxyCODONE  ER Tablet 10 milliGRAM(s) Oral every 12 hours  pantoprazole    Tablet 40 milliGRAM(s) Oral before breakfast  polyethylene glycol 3350 17 Gram(s) Oral two times a day  senna 2 Tablet(s) Oral at bedtime    MEDICATIONS  (PRN):  acetaminophen   Tablet 650 milliGRAM(s) Oral every 6 hours PRN For Temp greater than 38 C (100.4 F)  acetaminophen   Tablet. 650 milliGRAM(s) Oral every 6 hours PRN Mild Pain (1 - 3)  bisacodyl Suppository 10 milliGRAM(s) Rectal daily PRN Constipation  dextrose 40% Gel 15 Gram(s) Oral once PRN Blood Glucose LESS THAN 70 milliGRAM(s)/deciliter  glucagon  Injectable 1 milliGRAM(s) IntraMuscular once PRN Glucose LESS THAN 70 milligrams/deciliter  ondansetron Injectable 4 milliGRAM(s) IV Push every 6 hours PRN Nausea  oxyCODONE    5 mG/acetaminophen 325 mG 1 Tablet(s) Oral every 4 hours PRN Moderate Pain  oxyCODONE    5 mG/acetaminophen 325 mG 2 Tablet(s) Oral every 6 hours PRN Severe Pain  sodium biphosphate Rectal Enema 1 Enema Rectal once PRN constipation      Allergies    No Known Allergies    Intolerances        SOCIAL HISTORY:    FAMILY HISTORY:  No pertinent family history in first degree relatives      Vital Signs Last 24 Hrs  T(C): 36.4 (11 Jul 2018 08:44), Max: 36.6 (10 Jul 2018 10:32)  T(F): 97.6 (11 Jul 2018 08:44), Max: 97.9 (10 Jul 2018 10:32)  HR: 65 (11 Jul 2018 08:44) (65 - 85)  BP: 116/55 (11 Jul 2018 08:44) (102/69 - 132/70)  BP(mean): --  RR: 15 (11 Jul 2018 08:44) (15 - 20)  SpO2: 96% (11 Jul 2018 08:44) (96% - 98%)      PHYSICAL EXAM:    Constitutional: WDWN male resting comfortably in bed; NAD  Head: NC/AT  Eyes: PERRLA, EOMI, clear conjunctiva  ENT: no nasal discharge; no oropharyngeal erythema or exudates; dry oral mucosa  Neck: supple; no JVD or thyromegaly  Respiratory: CTA B/L; no W/R/R, no retractions  Cardiac: +S1/S2; RRR; no M/R/G; PMI non-displaced  Gastrointestinal: soft, NT/ND; no rebound or guarding; +BS, no hepatosplenomegaly  Extremities: WWP, no clubbing or cyanosis; no peripheral edema  Back: Clean, dry, intact stapled incision.   Vascular: 2+ radial, DP/PT pulses B/L  Lymphatic: no submandibular or cervical LAD  Skin: no rash, no ulcers  Neurologic: AAOx3; answers questions appropriately, follows commands, moves all extremities, CNII-XII grossly intact; no focal deficits, motor 5/5 in UE and LE, Reflexes 2+ in UE and LE b/l      LABS:                        9.9    17.5  )-----------( 365      ( 11 Jul 2018 07:29 )             31.3     07-11    138  |  100  |  30<H>  ----------------------------<  107<H>  5.0   |  24  |  1.04    Ca    9.0      11 Jul 2018 07:29  Phos  3.7     07-11  Mg     2.0     07-11            RADIOLOGY & ADDITIONAL STUDIES:

## 2018-07-11 NOTE — DISCHARGE NOTE ADULT - CARE PLAN
Principal Discharge DX:	Spinal deformity  Goal:	increase mobility  Assessment and plan of treatment:	After leaving the hospital, please follow these instructions:  • Keep your wound clean and dry.  Dressings need to be changed every 2 days as per plastic surgery  • You may use stairs as tolerated.  o No tub baths or swimming for two weeks.  • Take pain medicine as prescribed.  o You may find it helpful to take it for morning stiffness or for soreness when trying to  sleep.  • You will need someone to drive you to your first post-operative office visit.  • Avoid bending, twisting or heavy lifting.  • Do not sit for more than 20 minutes each time you sit.  • Do not wear pants that are tight on your incision.  Call you doctor immediately if you have:  • Any new numbness, tingling, or weakness in your arms and legs.  • Pain that is getting worse, or not going away after taking pain medicine.  • Any signs of infection at the wound site.  • Fever of 101° F or more.  _________________________________________________________________________  You must call discharge to make a follow- up appointment with your doctor.  To make your appointment for any questions, please call: [phone]. Principal Discharge DX:	Spinal deformity  Goal:	increase mobility  Assessment and plan of treatment:	After leaving the hospital, please follow these instructions:  • Keep your wound clean and dry.  Dressings need to be changed every 2 days as per plastic surgery  • You may use stairs as tolerated.  o No tub baths or swimming for two weeks. May restart aspirin 2 weeks after surgery  • Take pain medicine as prescribed.  o You may find it helpful to take it for morning stiffness or for soreness when trying to  sleep.  • You will need someone to drive you to your first post-operative office visit.  • Avoid bending, twisting or heavy lifting.  • Do not sit for more than 20 minutes each time you sit.  • Do not wear pants that are tight on your incision.  Call you doctor immediately if you have:  • Any new numbness, tingling, or weakness in your arms and legs.  • Pain that is getting worse, or not going away after taking pain medicine.  • Any signs of infection at the wound site.  • Fever of 101° F or more.  _________________________________________________________________________  You must call discharge to make a follow- up appointment with your doctor.  To make your appointment for any questions, please call: [phone].

## 2018-07-11 NOTE — CONSULT NOTE ADULT - ASSESSMENT
71 y/o male pmhx HTN, HLD, spinal stenosis s/p lumbar laminectomy last year presenting for T10 pelvis spinal fusion now with persistent leukocytosis while on steroids.     #Persistent leukocytosis - final rec's pending  - Persistent leukocytosis since spinal fusion surgery on June 29. Now increasing to ~17.   - UA (-), CXR from 7/6 without consolidation or infiltrates, dopplers negative  - No clear source of infection and hard to determine etiology for leukocytosis while taking steroids.   - Taper steroids  - ID will sign off for now - reconsult as needed. 71 y/o male pmhx HTN, HLD, spinal stenosis s/p lumbar laminectomy last year presenting for T10 pelvis spinal fusion now with persistent leukocytosis while on steroids.     #Persistent leukocytosis  - Persistent leukocytosis since spinal fusion surgery on June 29. Now increasing to ~17.   - UA (-), CXR from 7/6 without consolidation or infiltrates, dopplers negative  - No clear source of infection and hard to determine etiology for leukocytosis while taking steroids.   - Taper steroids and repeat CBC once off steroids. If still elevated will need full infectious work up.   - ID will sign off for now - reconsult as needed.

## 2018-07-11 NOTE — PROGRESS NOTE ADULT - RESPIRATORY
Breath Sounds equal & clear to percussion & auscultation, no accessory muscle use
Breath Sounds equal & clear to percussion & auscultation, no accessory muscle use
detailed exam
Breath Sounds equal & clear to percussion & auscultation, no accessory muscle use
detailed exam

## 2018-07-11 NOTE — PROGRESS NOTE ADULT - PROBLEM SELECTOR PLAN 2
On Statin and  continue to follow
On Statin land continue to follow
On Statin and  continue to follow
On Statin land continue to follow
On Statin land continue to follow

## 2018-07-11 NOTE — PROGRESS NOTE ADULT - PROBLEM SELECTOR PROBLEM 4
DM I (diabetes mellitus, type I), uncontrolled

## 2018-07-11 NOTE — PROGRESS NOTE ADULT - SUBJECTIVE AND OBJECTIVE BOX
HPI:  71 y/o male pmhx HTN, HLD, spinal stenosis s/p lumbar laminectomy last year presents today for elective T10-pelvis fusin today. Patient reports chronic LBP and ambulates with cane assistance. He denies any falls but reports imbalance with ambulating. He denies any leg pain, weakness, numbness or tingling. (29 Jun 2018 07:19)    OVERNIGHT EVENTS: No major event overnight. Bactrim discontinued. Patient has no complaint. Afebrile.  Ambulates with walker. tolerates diet.  CBC with differential pending  Vital Signs Last 24 Hrs  T(C): 36.4 (11 Jul 2018 05:03), Max: 36.6 (10 Jul 2018 10:32)  T(F): 97.5 (11 Jul 2018 05:03), Max: 97.9 (10 Jul 2018 10:32)  HR: 66 (11 Jul 2018 05:03) (66 - 85)  BP: 132/70 (11 Jul 2018 05:03) (102/69 - 132/70)  BP(mean): --  RR: 17 (11 Jul 2018 05:03) (15 - 20)  SpO2: 98% (11 Jul 2018 05:03) (97% - 98%)    I&O's Summary    09 Jul 2018 07:01  -  10 Jul 2018 07:00  --------------------------------------------------------  IN: 0 mL / OUT: 600 mL / NET: -600 mL    10 Jul 2018 07:01  -  11 Jul 2018 06:24  --------------------------------------------------------  IN: 0 mL / OUT: 400 mL / NET: -400 mL        PHYSICAL EXAM:  Neurological: AOx3, NAD, coherent speech, FC  CNII-XII grossly intact, PERRL, EOMI, face symmetric  MAEx4, strength 5/5 UE and LE b/l, no drift  SILT throughout  Incision/wound: back incision clean, dry and intact; dressing in place (per plastics)        Motor exam:         [x] Upper extremity              Bi(c5)  WE(c6)  EE(c7)   FF(c8)                                                R         5/5        5/5        5/5       5/5                                               L          5/5        5/5        5/5       5/5         [x] Lower extremeity          HF(l2)   KE(l3)    TA(l4)   EHL(l5)  GS(s1)                                                 R        5/5        5/5        5/5       5/5         5/5                                               L         5/5        5/5       5/5       5/5          5/5                                                        [x] warm well perfused; capillary refill <3 seconds     Sensation: [x] intact to light touch  [] decreased:         DIET: Regular  LABS:                        9.4    17.2  )-----------( 355      ( 10 Jul 2018 07:02 )             29.7     07-10    137  |  99  |  33<H>  ----------------------------<  105<H>  4.8   |  28  |  1.22    Ca    8.9      10 Jul 2018 07:02  Phos  3.8     07-10  Mg     2.0     07-10              CAPILLARY BLOOD GLUCOSE      POCT Blood Glucose.: 120 mg/dL (11 Jul 2018 05:57)  POCT Blood Glucose.: 163 mg/dL (10 Jul 2018 21:20)  POCT Blood Glucose.: 171 mg/dL (10 Jul 2018 17:00)  POCT Blood Glucose.: 113 mg/dL (10 Jul 2018 11:57)  POCT Blood Glucose.: 117 mg/dL (10 Jul 2018 06:45)      Drug Levels: [] N/A    CSF Analysis: [] N/A      Allergies    No Known Allergies    Intolerances      MEDICATIONS:  Antibiotics:    Neuro:  acetaminophen   Tablet 650 milliGRAM(s) Oral every 6 hours PRN  acetaminophen   Tablet. 650 milliGRAM(s) Oral every 6 hours PRN  ondansetron Injectable 4 milliGRAM(s) IV Push every 6 hours PRN  oxyCODONE    5 mG/acetaminophen 325 mG 1 Tablet(s) Oral every 4 hours PRN  oxyCODONE    5 mG/acetaminophen 325 mG 2 Tablet(s) Oral every 6 hours PRN  oxyCODONE  ER Tablet 10 milliGRAM(s) Oral every 12 hours    Anticoagulation:  enoxaparin Injectable 40 milliGRAM(s) SubCutaneous at bedtime    OTHER:  atorvastatin 80 milliGRAM(s) Oral at bedtime  bisacodyl Suppository 10 milliGRAM(s) Rectal daily PRN  BUpivacaine liposome 1.3% Injectable (no eMAR) 20 milliLiter(s) Local Injection once  dexamethasone     Tablet 2 milliGRAM(s) Oral every 12 hours  dextrose 40% Gel 15 Gram(s) Oral once PRN  dextrose 50% Injectable 12.5 Gram(s) IV Push once  dextrose 50% Injectable 25 Gram(s) IV Push once  dextrose 50% Injectable 25 Gram(s) IV Push once  docusate sodium 100 milliGRAM(s) Oral three times a day  glucagon  Injectable 1 milliGRAM(s) IntraMuscular once PRN  insulin glargine Injectable (LANTUS) 10 Unit(s) SubCutaneous at bedtime  insulin lispro (HumaLOG) corrective regimen sliding scale   SubCutaneous Before meals and at bedtime  insulin lispro Injectable (HumaLOG) 4 Unit(s) SubCutaneous three times a day before meals  metoprolol tartrate 25 milliGRAM(s) Oral every 12 hours  pantoprazole    Tablet 40 milliGRAM(s) Oral before breakfast  polyethylene glycol 3350 17 Gram(s) Oral two times a day  senna 2 Tablet(s) Oral at bedtime  sodium biphosphate Rectal Enema 1 Enema Rectal once PRN    IVF:  dextrose 5%. 1000 milliLiter(s) IV Continuous <Continuous>    CULTURES:    RADIOLOGY & ADDITIONAL TESTS:      ASSESSMENT:  72y Male s/p day 12 T1 - pelvis fusion with leukocytosis of unknown origem.    PLAN:  Follow up CBC with diff.  Dr. Moreno follow up for leukocytosis  ID consult  Possible discharge  DVT PROPHYLAXIS:  [x] Venodynes                                [x] Heparin/Lovenox    DISPOSITION: SHADE

## 2018-07-11 NOTE — PROGRESS NOTE ADULT - SUBJECTIVE AND OBJECTIVE BOX
Interval Events: Reviewed  Patient seen and examined at bedside.    Patient is a 72y old  Male who presents with a chief complaint of back pain (11 Jul 2018 11:44)  Is doing about the same. He is out of bed. He wants to leave to go home    PAST MEDICAL & SURGICAL HISTORY:  PAD (peripheral artery disease)  Hypercholesterolemia  Hypertension  H/O laminectomy  History of hernia repair  S/P hip replacement: right hip  S/P knee replacement, bilateral      MEDICATIONS:  Pulmonary:    Antimicrobials:    Anticoagulants:  enoxaparin Injectable 40 milliGRAM(s) SubCutaneous at bedtime    Cardiac:  metoprolol tartrate 25 milliGRAM(s) Oral every 12 hours      Allergies    No Known Allergies    Intolerances        Vital Signs Last 24 Hrs  T(C): 36.7 (11 Jul 2018 14:18), Max: 36.7 (11 Jul 2018 14:18)  T(F): 98 (11 Jul 2018 14:18), Max: 98 (11 Jul 2018 14:18)  HR: 70 (11 Jul 2018 14:18) (65 - 82)  BP: 120/70 (11 Jul 2018 14:18) (102/69 - 132/70)  BP(mean): --  RR: 17 (11 Jul 2018 14:18) (15 - 20)  SpO2: 98% (11 Jul 2018 14:18) (96% - 98%)    07-10 @ 07:01 - 07-11 @ 07:00  --------------------------------------------------------  IN: 0 mL / OUT: 700 mL / NET: -700 mL    07-11 @ 07:01 - 07-11 @ 19:51  --------------------------------------------------------  IN: 480 mL / OUT: 850 mL / NET: -370 mL          LABS:      CBC Full  -  ( 11 Jul 2018 07:29 )  WBC Count : 17.5 K/uL  Hemoglobin : 9.9 g/dL  Hematocrit : 31.3 %  Platelet Count - Automated : 365 K/uL  Mean Cell Volume : 95.1 fL  Mean Cell Hemoglobin : 30.1 pg  Mean Cell Hemoglobin Concentration : 31.6 g/dL  Auto Neutrophil # : x  Auto Lymphocyte # : x  Auto Monocyte # : x  Auto Eosinophil # : x  Auto Basophil # : x  Auto Neutrophil % : 81.2 %  Auto Lymphocyte % : 12.1 %  Auto Monocyte % : 6.0 %  Auto Eosinophil % : 0.6 %  Auto Basophil % : 0.1 %    07-11    138  |  100  |  30<H>  ----------------------------<  107<H>  5.0   |  24  |  1.04    Ca    9.0      11 Jul 2018 07:29  Phos  3.7     07-11  Mg     2.0     07-11                          RADIOLOGY & ADDITIONAL STUDIES (The following images were personally reviewed):  Ivory:                                     No  Urine output:                       adequate  DVT prophylaxis:                 Yes  Flattus:                                  Yes  Bowel movement:              No

## 2018-07-11 NOTE — PROGRESS NOTE ADULT - PROBLEM SELECTOR PLAN 3
Hypertension is control and the patient is on beta blocker. The pain might be aggravating the blood pressure which will fluctuates.
Hypertension is control and the patient is on beta blocker. The pain might be aggravating the blood pressure which will fluctuates.
Hypertension is control and the patient is on beta blocker. The blood pressure is on the low normal side
Hypertension is control and the patient is on beta blocker. The pain might be aggravating the blood pressure which will fluctuates. Observe over the next 24 hours
Hypertension is control and the patient is on beta blocker. The blood pressure is on the low normal side
Hypertension is control and the patient is on beta blocker. The pain might be aggravating the blood pressure which will fluctuates.
Hypertension is control and the patient is on beta blocker. The pain might be aggravating the blood pressure which will fluctuates. Observe over the next 24 hours

## 2018-07-11 NOTE — DISCHARGE NOTE ADULT - PATIENT PORTAL LINK FT
You can access the [facility], offered by [facility], by registering with the following website: [email or web address]

## 2018-07-11 NOTE — DISCHARGE NOTE ADULT - PLAN OF CARE
increase mobility After leaving the hospital, please follow these instructions:  • Keep your wound clean and dry.  Dressings need to be changed every 2 days as per plastic surgery  • You may use stairs as tolerated.  o No tub baths or swimming for two weeks.  • Take pain medicine as prescribed.  o You may find it helpful to take it for morning stiffness or for soreness when trying to  sleep.  • You will need someone to drive you to your first post-operative office visit.  • Avoid bending, twisting or heavy lifting.  • Do not sit for more than 20 minutes each time you sit.  • Do not wear pants that are tight on your incision.  Call you doctor immediately if you have:  • Any new numbness, tingling, or weakness in your arms and legs.  • Pain that is getting worse, or not going away after taking pain medicine.  • Any signs of infection at the wound site.  • Fever of 101° F or more.  _________________________________________________________________________  You must call discharge to make a follow- up appointment with your doctor.  To make your appointment for any questions, please call: [phone]. After leaving the hospital, please follow these instructions:  • Keep your wound clean and dry.  Dressings need to be changed every 2 days as per plastic surgery  • You may use stairs as tolerated.  o No tub baths or swimming for two weeks. May restart aspirin 2 weeks after surgery  • Take pain medicine as prescribed.  o You may find it helpful to take it for morning stiffness or for soreness when trying to  sleep.  • You will need someone to drive you to your first post-operative office visit.  • Avoid bending, twisting or heavy lifting.  • Do not sit for more than 20 minutes each time you sit.  • Do not wear pants that are tight on your incision.  Call you doctor immediately if you have:  • Any new numbness, tingling, or weakness in your arms and legs.  • Pain that is getting worse, or not going away after taking pain medicine.  • Any signs of infection at the wound site.  • Fever of 101° F or more.  _________________________________________________________________________  You must call discharge to make a follow- up appointment with your doctor.  To make your appointment for any questions, please call: [phone].

## 2018-07-11 NOTE — DISCHARGE NOTE ADULT - MEDICATION SUMMARY - MEDICATIONS TO TAKE
I will START or STAY ON the medications listed below when I get home from the hospital:    dexamethasone 2 mg oral tablet  -- 1 tab(s) by mouth x 1 dose on 7/12, then d/c  -- Indication: For Lumbar fusion    Percocet 5/325 oral tablet  -- 1-2 tab(s) by mouth every 4 hours, As needed, Moderate Pain  -- Indication: For PAin    oxyCODONE 10 mg oral tablet, extended release  -- 1 tab(s) by mouth every 12 hours  -- Indication: For PAin    losartan 25 mg oral tablet  --  1 tab by mouth daily   -- Indication: For Hypertension    enoxaparin  -- 40mg subcutaneous daily  -- Indication: For DVT prophylaxis    insulin glargine  -- 10 units subcutaneously at bedtime  -- Indication: For DM I (diabetes mellitus, type I), uncontrolled    HumaLOG 100 units/mL injectable solution  -- 4 units subcutaneously three times a day with meals  -- Indication: For DM I (diabetes mellitus, type I), uncontrolled    Crestor 20 mg oral tablet  -- 1 tab(s) by mouth once a day (at bedtime)  -- Indication: For Hypercholesterolemia    metoprolol tartrate 25 mg oral tablet  -- 1 tab(s) by mouth every 12 hours  -- Indication: For Hypertension    bisacodyl 10 mg rectal suppository  -- 1 suppository(ies) rectally once a day, As needed, Constipation  -- Indication: For bowel regimen    docusate sodium 100 mg oral capsule  -- 1 cap(s) by mouth 3 times a day  -- Indication: For bowel regimen    polyethylene glycol 3350 oral powder for reconstitution  -- 17 gram(s) by mouth 2 times a day  -- Indication: For bowel regimen    senna oral tablet  -- 2 tab(s) by mouth once a day (at bedtime)  -- Indication: For bowel regimen    pantoprazole 40 mg oral delayed release tablet  -- 1 tab(s) by mouth once a day (before a meal)  -- Indication: For GERD I will START or STAY ON the medications listed below when I get home from the hospital:    dexamethasone 2 mg oral tablet  -- 1 tab(s) by mouth x 1 dose on 7/12  -- Indication: For Steroid taper    dexamethasone 0.5 mg oral tablet  -- 1 tab(s) by mouth daily x 2 days (on 7/13 and 7/14) then discontinue  -- Indication: For Steroid taper    Percocet 5/325 oral tablet  -- 1-2 tab(s) by mouth every 4 hours, As needed, Moderate Pain  -- Indication: For PAin    oxyCODONE 10 mg oral tablet, extended release  -- 1 tab(s) by mouth every 12 hours  -- Indication: For PAin    losartan 25 mg oral tablet  --  1 tab by mouth daily   -- Indication: For HTN    enoxaparin  -- 40mg subcutaneous daily  -- Indication: For Dvt prophylaxis    insulin glargine  -- 10 units subcutaneously at bedtime  -- Indication: For DM I (diabetes mellitus, type I), uncontrolled    HumaLOG 100 units/mL injectable solution  -- 4 units subcutaneously three times a day with meals  -- Indication: For DM I (diabetes mellitus, type I), uncontrolled    Crestor 20 mg oral tablet  -- 1 tab(s) by mouth once a day (at bedtime)  -- Indication: For HLD    metoprolol tartrate 25 mg oral tablet  -- 1 tab(s) by mouth every 12 hours  -- Indication: For HTN    bisacodyl 10 mg rectal suppository  -- 1 suppository(ies) rectally once a day, As needed, Constipation  -- Indication: For Constipation    docusate sodium 100 mg oral capsule  -- 1 cap(s) by mouth 3 times a day  -- Indication: For Constipation    polyethylene glycol 3350 oral powder for reconstitution  -- 17 gram(s) by mouth 2 times a day  -- Indication: For Constipation    senna oral tablet  -- 2 tab(s) by mouth once a day (at bedtime)  -- Indication: For Constipation    pantoprazole 40 mg oral delayed release tablet  -- 1 tab(s) by mouth once a day (before a meal)  -- Indication: For gi prophyaxis

## 2018-07-11 NOTE — DISCHARGE NOTE ADULT - CARE PROVIDER_API CALL
Nic Young), Neurological Surgery  [address] [address]  Phone: [phone]  Fax: [phone]    Cass Moreno), Critical Care Medicine; Pulmonary Disease  [address] [address]  Phone: [phone]  Fax: [phone]    Stevenson Arredondo), Orthopedics  [address] [address]  Phone: [phone]  Fax: [phone]

## 2018-07-11 NOTE — PROGRESS NOTE ADULT - PROBLEM SELECTOR PROBLEM 5
Ventricular tachycardia

## 2018-07-12 DIAGNOSIS — D72.828 OTHER ELEVATED WHITE BLOOD CELL COUNT: ICD-10-CM

## 2018-07-18 DIAGNOSIS — M48.061 SPINAL STENOSIS, LUMBAR REGION WITHOUT NEUROGENIC CLAUDICATION: ICD-10-CM

## 2018-07-18 DIAGNOSIS — D62 ACUTE POSTHEMORRHAGIC ANEMIA: ICD-10-CM

## 2018-07-18 DIAGNOSIS — M48.07 SPINAL STENOSIS, LUMBOSACRAL REGION: ICD-10-CM

## 2018-07-18 DIAGNOSIS — I73.9 PERIPHERAL VASCULAR DISEASE, UNSPECIFIED: ICD-10-CM

## 2018-07-18 DIAGNOSIS — Z96.653 PRESENCE OF ARTIFICIAL KNEE JOINT, BILATERAL: ICD-10-CM

## 2018-07-18 DIAGNOSIS — Z78.1 PHYSICAL RESTRAINT STATUS: ICD-10-CM

## 2018-07-18 DIAGNOSIS — Z96.641 PRESENCE OF RIGHT ARTIFICIAL HIP JOINT: ICD-10-CM

## 2018-07-18 DIAGNOSIS — T38.0X5A ADVERSE EFFECT OF GLUCOCORTICOIDS AND SYNTHETIC ANALOGUES, INITIAL ENCOUNTER: ICD-10-CM

## 2018-07-18 DIAGNOSIS — M54.5 LOW BACK PAIN: ICD-10-CM

## 2018-07-18 DIAGNOSIS — T38.7X5A ADVERSE EFFECT OF ANDROGENS AND ANABOLIC CONGENERS, INITIAL ENCOUNTER: ICD-10-CM

## 2018-07-18 DIAGNOSIS — R00.8 OTHER ABNORMALITIES OF HEART BEAT: ICD-10-CM

## 2018-07-18 DIAGNOSIS — E78.00 PURE HYPERCHOLESTEROLEMIA, UNSPECIFIED: ICD-10-CM

## 2018-07-18 DIAGNOSIS — M43.9 DEFORMING DORSOPATHY, UNSPECIFIED: ICD-10-CM

## 2018-07-18 DIAGNOSIS — D72.829 ELEVATED WHITE BLOOD CELL COUNT, UNSPECIFIED: ICD-10-CM

## 2018-07-18 DIAGNOSIS — I10 ESSENTIAL (PRIMARY) HYPERTENSION: ICD-10-CM

## 2018-07-18 DIAGNOSIS — I25.10 ATHEROSCLEROTIC HEART DISEASE OF NATIVE CORONARY ARTERY WITHOUT ANGINA PECTORIS: ICD-10-CM

## 2018-07-18 DIAGNOSIS — R07.1 CHEST PAIN ON BREATHING: ICD-10-CM

## 2018-07-18 DIAGNOSIS — E66.9 OBESITY, UNSPECIFIED: ICD-10-CM

## 2018-07-18 DIAGNOSIS — M40.56 LORDOSIS, UNSPECIFIED, LUMBAR REGION: ICD-10-CM

## 2018-07-18 DIAGNOSIS — I47.2 VENTRICULAR TACHYCARDIA: ICD-10-CM

## 2018-07-18 DIAGNOSIS — Z79.82 LONG TERM (CURRENT) USE OF ASPIRIN: ICD-10-CM

## 2018-07-18 DIAGNOSIS — E10.65 TYPE 1 DIABETES MELLITUS WITH HYPERGLYCEMIA: ICD-10-CM

## 2018-07-18 DIAGNOSIS — M48.05 SPINAL STENOSIS, THORACOLUMBAR REGION: ICD-10-CM

## 2018-08-06 ENCOUNTER — FORM ENCOUNTER (OUTPATIENT)
Age: 73
End: 2018-08-06

## 2018-08-07 ENCOUNTER — APPOINTMENT (OUTPATIENT)
Dept: SPINE | Facility: CLINIC | Age: 73
End: 2018-08-07
Payer: MEDICARE

## 2018-08-07 ENCOUNTER — OUTPATIENT (OUTPATIENT)
Dept: OUTPATIENT SERVICES | Facility: HOSPITAL | Age: 73
LOS: 1 days | End: 2018-08-07
Payer: MEDICARE

## 2018-08-07 VITALS
SYSTOLIC BLOOD PRESSURE: 155 MMHG | WEIGHT: 284 LBS | RESPIRATION RATE: 18 BRPM | OXYGEN SATURATION: 96 % | HEART RATE: 91 BPM | TEMPERATURE: 98.9 F | BODY MASS INDEX: 36.46 KG/M2 | DIASTOLIC BLOOD PRESSURE: 76 MMHG

## 2018-08-07 DIAGNOSIS — Z48.02 ENCOUNTER FOR REMOVAL OF SUTURES: ICD-10-CM

## 2018-08-07 DIAGNOSIS — Z96.653 PRESENCE OF ARTIFICIAL KNEE JOINT, BILATERAL: Chronic | ICD-10-CM

## 2018-08-07 DIAGNOSIS — Z98.890 OTHER SPECIFIED POSTPROCEDURAL STATES: Chronic | ICD-10-CM

## 2018-08-07 DIAGNOSIS — Z96.60 PRESENCE OF UNSPECIFIED ORTHOPEDIC JOINT IMPLANT: Chronic | ICD-10-CM

## 2018-08-07 DIAGNOSIS — Z09 ENCOUNTER FOR FOLLOW-UP EXAMINATION AFTER COMPLETED TREATMENT FOR CONDITIONS OTHER THAN MALIGNANT NEOPLASM: ICD-10-CM

## 2018-08-07 PROCEDURE — 72080 X-RAY EXAM THORACOLMB 2/> VW: CPT

## 2018-08-07 PROCEDURE — 99024 POSTOP FOLLOW-UP VISIT: CPT

## 2018-08-07 PROCEDURE — 72080 X-RAY EXAM THORACOLMB 2/> VW: CPT | Mod: 26

## 2018-08-07 RX ORDER — ROSUVASTATIN CALCIUM 10 MG/1
10 TABLET, FILM COATED ORAL
Qty: 90 | Refills: 0 | Status: DISCONTINUED | COMMUNITY
Start: 2018-03-05

## 2018-10-14 ENCOUNTER — EMERGENCY (EMERGENCY)
Facility: HOSPITAL | Age: 73
LOS: 1 days | Discharge: HOME | End: 2018-10-14
Attending: EMERGENCY MEDICINE | Admitting: EMERGENCY MEDICINE
Payer: MEDICARE

## 2018-10-14 VITALS
OXYGEN SATURATION: 100 % | SYSTOLIC BLOOD PRESSURE: 138 MMHG | TEMPERATURE: 99 F | DIASTOLIC BLOOD PRESSURE: 75 MMHG | RESPIRATION RATE: 16 BRPM | HEART RATE: 98 BPM

## 2018-10-14 DIAGNOSIS — Z98.890 OTHER SPECIFIED POSTPROCEDURAL STATES: Chronic | ICD-10-CM

## 2018-10-14 DIAGNOSIS — Z96.653 PRESENCE OF ARTIFICIAL KNEE JOINT, BILATERAL: Chronic | ICD-10-CM

## 2018-10-14 DIAGNOSIS — Z96.60 PRESENCE OF UNSPECIFIED ORTHOPEDIC JOINT IMPLANT: Chronic | ICD-10-CM

## 2018-10-14 LAB
ALBUMIN SERPL ELPH-MCNC: 3.8 G/DL — SIGNIFICANT CHANGE UP (ref 3.3–5)
ALP SERPL-CCNC: 100 U/L — SIGNIFICANT CHANGE UP (ref 40–120)
ALT FLD-CCNC: 17 U/L — SIGNIFICANT CHANGE UP (ref 4–41)
AST SERPL-CCNC: 62 U/L — HIGH (ref 4–40)
BASOPHILS # BLD AUTO: 0.04 K/UL — SIGNIFICANT CHANGE UP (ref 0–0.2)
BASOPHILS NFR BLD AUTO: 0.4 % — SIGNIFICANT CHANGE UP (ref 0–2)
BILIRUB SERPL-MCNC: 0.3 MG/DL — SIGNIFICANT CHANGE UP (ref 0.2–1.2)
BUN SERPL-MCNC: 17 MG/DL — SIGNIFICANT CHANGE UP (ref 7–23)
CALCIUM SERPL-MCNC: 8.9 MG/DL — SIGNIFICANT CHANGE UP (ref 8.4–10.5)
CHLORIDE SERPL-SCNC: 103 MMOL/L — SIGNIFICANT CHANGE UP (ref 98–107)
CO2 SERPL-SCNC: 19 MMOL/L — LOW (ref 22–31)
CREAT SERPL-MCNC: 0.78 MG/DL — SIGNIFICANT CHANGE UP (ref 0.5–1.3)
EOSINOPHIL # BLD AUTO: 0.22 K/UL — SIGNIFICANT CHANGE UP (ref 0–0.5)
EOSINOPHIL NFR BLD AUTO: 2.5 % — SIGNIFICANT CHANGE UP (ref 0–6)
ERYTHROCYTE [SEDIMENTATION RATE] IN BLOOD: 23 MM/HR — HIGH (ref 1–15)
ERYTHROCYTE [SEDIMENTATION RATE] IN BLOOD: SIGNIFICANT CHANGE UP MM/HR (ref 1–15)
GLUCOSE SERPL-MCNC: 146 MG/DL — HIGH (ref 70–99)
HCT VFR BLD CALC: 42.5 % — SIGNIFICANT CHANGE UP (ref 39–50)
HGB BLD-MCNC: 13.4 G/DL — SIGNIFICANT CHANGE UP (ref 13–17)
IMM GRANULOCYTES # BLD AUTO: 0.04 # — SIGNIFICANT CHANGE UP
IMM GRANULOCYTES NFR BLD AUTO: 0.4 % — SIGNIFICANT CHANGE UP (ref 0–1.5)
LYMPHOCYTES # BLD AUTO: 1.01 K/UL — SIGNIFICANT CHANGE UP (ref 1–3.3)
LYMPHOCYTES # BLD AUTO: 11.3 % — LOW (ref 13–44)
MCHC RBC-ENTMCNC: 27.3 PG — SIGNIFICANT CHANGE UP (ref 27–34)
MCHC RBC-ENTMCNC: 31.5 % — LOW (ref 32–36)
MCV RBC AUTO: 86.6 FL — SIGNIFICANT CHANGE UP (ref 80–100)
MONOCYTES # BLD AUTO: 0.78 K/UL — SIGNIFICANT CHANGE UP (ref 0–0.9)
MONOCYTES NFR BLD AUTO: 8.7 % — SIGNIFICANT CHANGE UP (ref 2–14)
NEUTROPHILS # BLD AUTO: 6.85 K/UL — SIGNIFICANT CHANGE UP (ref 1.8–7.4)
NEUTROPHILS NFR BLD AUTO: 76.7 % — SIGNIFICANT CHANGE UP (ref 43–77)
NRBC # FLD: 0.02 — SIGNIFICANT CHANGE UP
PLATELET # BLD AUTO: 260 K/UL — SIGNIFICANT CHANGE UP (ref 150–400)
PMV BLD: 10.9 FL — SIGNIFICANT CHANGE UP (ref 7–13)
POTASSIUM SERPL-MCNC: SIGNIFICANT CHANGE UP MMOL/L (ref 3.5–5.3)
POTASSIUM SERPL-SCNC: SIGNIFICANT CHANGE UP MMOL/L (ref 3.5–5.3)
PROT SERPL-MCNC: SIGNIFICANT CHANGE UP G/DL (ref 6–8.3)
RBC # BLD: 4.91 M/UL — SIGNIFICANT CHANGE UP (ref 4.2–5.8)
RBC # FLD: 15.7 % — HIGH (ref 10.3–14.5)
SODIUM SERPL-SCNC: 134 MMOL/L — LOW (ref 135–145)
WBC # BLD: 8.94 K/UL — SIGNIFICANT CHANGE UP (ref 3.8–10.5)
WBC # FLD AUTO: 8.94 K/UL — SIGNIFICANT CHANGE UP (ref 3.8–10.5)

## 2018-10-14 PROCEDURE — 73080 X-RAY EXAM OF ELBOW: CPT | Mod: 26,RT

## 2018-10-14 PROCEDURE — 99283 EMERGENCY DEPT VISIT LOW MDM: CPT

## 2018-10-14 RX ORDER — KETOROLAC TROMETHAMINE 30 MG/ML
30 SYRINGE (ML) INJECTION ONCE
Qty: 0 | Refills: 0 | Status: DISCONTINUED | OUTPATIENT
Start: 2018-10-14 | End: 2018-10-14

## 2018-10-14 RX ADMIN — Medication 30 MILLIGRAM(S): at 21:03

## 2018-10-14 NOTE — CONSULT NOTE ADULT - ASSESSMENT
73yMale with R triceps tendonitis vs olecranon bursitis     pain control, NSAIDS  WBAT, compression  No acute orthopaedic intervention  Please call if you have further question  Can follow up with Dr. Chapman vs [facility]onse in 1-2 weeks if no improvement     Ortho [id number]

## 2018-10-14 NOTE — ED ADULT NURSE NOTE - CHPI ED NUR SYMPTOMS NEG
no difficulty bearing weight/no tingling/no stiffness/no weakness/no deformity/no fever/no abrasion/no numbness/no bruising/no back pain

## 2018-10-14 NOTE — ED ADULT TRIAGE NOTE - CHIEF COMPLAINT QUOTE
pt recoeveruing from back surgery (3 months ago) c/o pain to right elbow x 1.5 days. believes he may have injured it wheeling his wife in a wheelchair. unable to get comfortable. no fall or trauma.

## 2018-10-14 NOTE — ED PROVIDER NOTE - PROGRESS NOTE DETAILS
Orthopedics has evaluated patient, feels patient has tendinitis or possibly bursitis, do not see drainable collection, recommend ice/compression/NSAIDs/fu outpatient with ortho. -SM

## 2018-10-14 NOTE — ED ADULT NURSE NOTE - NSIMPLEMENTINTERV_GEN_ALL_ED
Implemented All Universal Safety Interventions:  [place] to call system. Call bell, personal items and telephone within reach. Instruct patient to call for assistance. Room bathroom lighting operational. Non-slip footwear when patient is off stretcher. Physically safe environment: no spills, clutter or unnecessary equipment. Stretcher in lowest position, wheels locked, appropriate side rails in place.

## 2018-10-14 NOTE — ED PROVIDER NOTE - MEDICAL DECISION MAKING DETAILS
one day pf increasing pain to rt elbow with evidence of an olecrenon bursisit but also mild restriction to ROM. will order xrays, labs and reassess. Orthopedics to be consulted.

## 2018-10-14 NOTE — ED ADULT NURSE NOTE - OBJECTIVE STATEMENT
74 y/o male presents to ED with c/o right elbow pain.  Pt states that he has had progressively worsening swelling and pain to right elbow, unknown if there was trauma to area, pt states that he may have injured it wheeling his wife in wheelchair.  Right elbow with swelling, some increase in skin temperature, no rash, or breaks in skin noted, distal pulse easily palpable, Pt denies fever chills, no n/v/d.  IV established, labs drawn and sent, awaiting xray.

## 2018-10-14 NOTE — ED ADULT NURSE NOTE - LOCATION
No Heavy lifting/straining/Walking-Outdoors allowed/Walking-Indoors allowed/Bathing allowed/Showering allowed elbow

## 2018-10-14 NOTE — ED PROVIDER NOTE - OBJECTIVE STATEMENT
maurice: past day notes increasing pain to rt elbow. no hx trauma. no similar pain in past. no fevers or chills.  of note, pt has spinal reconstruction several months prior

## 2018-10-14 NOTE — ED PROVIDER NOTE - ATTENDING CONTRIBUTION TO CARE
maurice: past day notes increasing pain to rt elbow. no hx trauma. no similar pain in past. no fevers or chills.  of note, pt has spinal reconstruction several months prior.  exam rt elbow  indicates good flexion but extension limited to approx 10 degrees.  there is an area of redness and swelling to olec bursae region.   Concern for olecrenon bursitis or possiblly a deeper bursael infection as pt cant full extent elbow.  orthopedics consulted and pt signed over.  labs sent including esr and cbc.

## 2018-11-12 ENCOUNTER — FORM ENCOUNTER (OUTPATIENT)
Age: 73
End: 2018-11-12

## 2018-11-13 ENCOUNTER — APPOINTMENT (OUTPATIENT)
Dept: ORTHOPEDIC SURGERY | Facility: CLINIC | Age: 73
End: 2018-11-13
Payer: MEDICARE

## 2018-11-13 ENCOUNTER — APPOINTMENT (OUTPATIENT)
Dept: CT IMAGING | Facility: CLINIC | Age: 73
End: 2018-11-13
Payer: MEDICARE

## 2018-11-13 ENCOUNTER — OUTPATIENT (OUTPATIENT)
Dept: OUTPATIENT SERVICES | Facility: HOSPITAL | Age: 73
LOS: 1 days | End: 2018-11-13
Payer: MEDICARE

## 2018-11-13 ENCOUNTER — APPOINTMENT (OUTPATIENT)
Dept: MRI IMAGING | Facility: CLINIC | Age: 73
End: 2018-11-13
Payer: MEDICARE

## 2018-11-13 VITALS
DIASTOLIC BLOOD PRESSURE: 70 MMHG | SYSTOLIC BLOOD PRESSURE: 140 MMHG | OXYGEN SATURATION: 7 % | BODY MASS INDEX: 37.22 KG/M2 | HEART RATE: 70 BPM | HEIGHT: 74 IN | WEIGHT: 290 LBS

## 2018-11-13 DIAGNOSIS — Z98.890 OTHER SPECIFIED POSTPROCEDURAL STATES: Chronic | ICD-10-CM

## 2018-11-13 DIAGNOSIS — Z96.653 PRESENCE OF ARTIFICIAL KNEE JOINT, BILATERAL: Chronic | ICD-10-CM

## 2018-11-13 DIAGNOSIS — Z96.60 PRESENCE OF UNSPECIFIED ORTHOPEDIC JOINT IMPLANT: Chronic | ICD-10-CM

## 2018-11-13 DIAGNOSIS — Z98.1 ARTHRODESIS STATUS: ICD-10-CM

## 2018-11-13 PROCEDURE — 72128 CT CHEST SPINE W/O DYE: CPT | Mod: 26

## 2018-11-13 PROCEDURE — 72128 CT CHEST SPINE W/O DYE: CPT

## 2018-11-13 PROCEDURE — 72131 CT LUMBAR SPINE W/O DYE: CPT | Mod: 26

## 2018-11-13 PROCEDURE — 76376 3D RENDER W/INTRP POSTPROCES: CPT | Mod: 26

## 2018-11-13 PROCEDURE — 76376 3D RENDER W/INTRP POSTPROCES: CPT

## 2018-11-13 PROCEDURE — 99204 OFFICE O/P NEW MOD 45 MIN: CPT

## 2018-11-13 PROCEDURE — 99214 OFFICE O/P EST MOD 30 MIN: CPT

## 2018-11-13 PROCEDURE — 72082 X-RAY EXAM ENTIRE SPI 2/3 VW: CPT | Mod: 26

## 2018-11-13 PROCEDURE — 72148 MRI LUMBAR SPINE W/O DYE: CPT | Mod: 26

## 2018-11-13 PROCEDURE — 72148 MRI LUMBAR SPINE W/O DYE: CPT

## 2018-11-13 PROCEDURE — 72131 CT LUMBAR SPINE W/O DYE: CPT

## 2018-11-13 PROCEDURE — 72082 X-RAY EXAM ENTIRE SPI 2/3 VW: CPT

## 2018-11-14 RX ORDER — ASPIRIN/CALCIUM CARB/MAGNESIUM 324 MG
1 TABLET ORAL
Qty: 0 | Refills: 0 | COMMUNITY

## 2018-11-21 PROBLEM — I73.9 PERIPHERAL VASCULAR DISEASE, UNSPECIFIED: Chronic | Status: ACTIVE | Noted: 2018-06-29

## 2018-11-26 ENCOUNTER — APPOINTMENT (OUTPATIENT)
Dept: ORTHOPEDIC SURGERY | Facility: CLINIC | Age: 73
End: 2018-11-26
Payer: MEDICARE

## 2018-11-26 DIAGNOSIS — M43.8X9 OTHER SPECIFIED DEFORMING DORSOPATHIES, SITE UNSPECIFIED: ICD-10-CM

## 2018-11-26 DIAGNOSIS — Z01.818 ENCOUNTER FOR OTHER PREPROCEDURAL EXAMINATION: ICD-10-CM

## 2018-11-26 PROCEDURE — 99215 OFFICE O/P EST HI 40 MIN: CPT

## 2018-11-27 PROBLEM — M43.8X9 SAGITTAL PLANE IMBALANCE: Status: ACTIVE | Noted: 2018-04-19

## 2018-11-29 ENCOUNTER — OUTPATIENT (OUTPATIENT)
Dept: OUTPATIENT SERVICES | Facility: HOSPITAL | Age: 73
LOS: 1 days | End: 2018-11-29
Payer: MEDICARE

## 2018-11-29 DIAGNOSIS — Z98.890 OTHER SPECIFIED POSTPROCEDURAL STATES: Chronic | ICD-10-CM

## 2018-11-29 DIAGNOSIS — Z96.60 PRESENCE OF UNSPECIFIED ORTHOPEDIC JOINT IMPLANT: Chronic | ICD-10-CM

## 2018-11-29 DIAGNOSIS — Z22.321 CARRIER OR SUSPECTED CARRIER OF METHICILLIN SUSCEPTIBLE STAPHYLOCOCCUS AUREUS: ICD-10-CM

## 2018-11-29 DIAGNOSIS — Z96.653 PRESENCE OF ARTIFICIAL KNEE JOINT, BILATERAL: Chronic | ICD-10-CM

## 2018-11-29 PROCEDURE — 87641 MR-STAPH DNA AMP PROBE: CPT

## 2019-01-18 ENCOUNTER — OTHER (OUTPATIENT)
Age: 74
End: 2019-01-18

## 2019-01-25 ENCOUNTER — APPOINTMENT (OUTPATIENT)
Dept: VASCULAR SURGERY | Facility: CLINIC | Age: 74
End: 2019-01-25
Payer: MEDICARE

## 2019-01-25 PROCEDURE — 99204 OFFICE O/P NEW MOD 45 MIN: CPT

## 2019-01-28 NOTE — HISTORY OF PRESENT ILLNESS
[FreeTextEntry1] : pt here for preop evaluation of anterior exposure\par pt had back surgery several months ago and was doing well until the rods in his back broke\par he is now in crippling pain \par pt needs to have reop to fix his back\par he is slated to have anterior exposure of the spine\par

## 2019-01-30 NOTE — ASU PATIENT PROFILE, ADULT - PSH
H/O laminectomy    History of hernia repair    S/P hip replacement  right hip  S/P knee replacement, bilateral H/O laminectomy    History of hernia repair    History of lumbosacral spine surgery    S/P hip replacement  right hip  S/P knee replacement, bilateral

## 2019-01-30 NOTE — H&P ADULT - NSHPPHYSICALEXAM_GEN_ALL_CORE
MSK:  Decreased ROM of lumbar spine secondary to pain.  Remainder of PE as per medical clearance. MSK: No deformity or open wounds. No rashes or lesions. EHL/TA/GS/FHL 5/5 BLE. Sensation intact and equal BLE. Skin warm and well perfused. DP palpable BLE. Decreased ROM of lumbar spine secondary to pain.  Remainder of PE as per medical clearance.

## 2019-01-30 NOTE — H&P ADULT - HISTORY OF PRESENT ILLNESS
74yo male co lumbar spine pain x  .  Presents for elective anterior lumbar interbody fusion L4-Sl with interbody cages and neuromonitoring.  Revision posterior spinal fusion T10-pelvis with instrumentation and neuromonitoring. 74yo male presents with lumbar spine pain x  several years. The patient states he had spinal surgery in June 2018 but that the "rods broke." He denies preceding accident/injury or trauma to the back. He complains of pain to his low back and denies radiation of pain to his legs. He states that his legs are "sometimes tired." The patient states he has been ambulating with a cane for several years. Denies numbness/tingling down the lower extremities and any bowel/bladder incontinence. Denies hx of DVT. The patient feels well today and denies chest pain/shortness of breath/fever/chills.   Presents for elective anterior lumbar interbody fusion L4-Sl with interbody cages and neuromonitoring.  Revision posterior spinal fusion T10-pelvis with instrumentation and neuromonitoring. 72yo male presents with lumbar spine pain x  several years. The patient states he had spinal surgery in June 2018 but that the "rods broke." He denies preceding accident/injury or trauma to the back. He complains of pain to his low back and denies radiation of pain to his legs. He states that his legs are "sometimes tired." The patient states he has been ambulating with a cane for several years. The patient tried conservative therapies which failed to alleviate his symptoms. Denies numbness/tingling down the lower extremities and any bowel/bladder incontinence. Denies hx of DVT. The patient feels well today and denies chest pain/shortness of breath/fever/chills.   Presents for elective anterior lumbar interbody fusion L4-Sl with interbody cages and neuromonitoring.  Revision posterior spinal fusion T10-pelvis with instrumentation and neuromonitoring.

## 2019-01-30 NOTE — H&P ADULT - NSHPLABSRESULTS_GEN_ALL_CORE
Preop cbc, bmp, pt/inr, ua wnl   nasal swab negative  preop cxr wnl per clearance  preop ekg NSR wnl per clearance   Preop cardiac stress test wnl no signs of cardiac ischemia.

## 2019-01-30 NOTE — H&P ADULT - PROBLEM SELECTOR PLAN 1
Admit to orthopedics.  Presents for elective ALIF L4-S1 with interbody cages. Revision PSF T10-Pelvis with instrumentation.  Medically optimized and cleared for surgery by Dr. Hurtado

## 2019-01-31 ENCOUNTER — APPOINTMENT (OUTPATIENT)
Dept: ORTHOPEDIC SURGERY | Facility: HOSPITAL | Age: 74
End: 2019-01-31

## 2019-01-31 ENCOUNTER — INPATIENT (INPATIENT)
Facility: HOSPITAL | Age: 74
LOS: 5 days | Discharge: NURSING, REHAB OR LONG-TERM CARE | DRG: 454 | End: 2019-02-06
Attending: ORTHOPAEDIC SURGERY | Admitting: ORTHOPAEDIC SURGERY
Payer: MEDICARE

## 2019-01-31 ENCOUNTER — RESULT REVIEW (OUTPATIENT)
Age: 74
End: 2019-01-31

## 2019-01-31 VITALS
WEIGHT: 297.18 LBS | HEART RATE: 88 BPM | SYSTOLIC BLOOD PRESSURE: 124 MMHG | DIASTOLIC BLOOD PRESSURE: 59 MMHG | OXYGEN SATURATION: 96 % | TEMPERATURE: 98 F | RESPIRATION RATE: 16 BRPM | HEIGHT: 74 IN

## 2019-01-31 DIAGNOSIS — Z96.60 PRESENCE OF UNSPECIFIED ORTHOPEDIC JOINT IMPLANT: Chronic | ICD-10-CM

## 2019-01-31 DIAGNOSIS — E78.00 PURE HYPERCHOLESTEROLEMIA, UNSPECIFIED: ICD-10-CM

## 2019-01-31 DIAGNOSIS — Z98.890 OTHER SPECIFIED POSTPROCEDURAL STATES: Chronic | ICD-10-CM

## 2019-01-31 DIAGNOSIS — E10.9 TYPE 1 DIABETES MELLITUS WITHOUT COMPLICATIONS: ICD-10-CM

## 2019-01-31 DIAGNOSIS — I10 ESSENTIAL (PRIMARY) HYPERTENSION: ICD-10-CM

## 2019-01-31 DIAGNOSIS — M48.00 SPINAL STENOSIS, SITE UNSPECIFIED: ICD-10-CM

## 2019-01-31 DIAGNOSIS — Z96.653 PRESENCE OF ARTIFICIAL KNEE JOINT, BILATERAL: Chronic | ICD-10-CM

## 2019-01-31 DIAGNOSIS — I73.9 PERIPHERAL VASCULAR DISEASE, UNSPECIFIED: ICD-10-CM

## 2019-01-31 LAB
ANION GAP SERPL CALC-SCNC: 9 MMOL/L — SIGNIFICANT CHANGE UP (ref 5–17)
BASOPHILS NFR BLD AUTO: 0.1 % — SIGNIFICANT CHANGE UP (ref 0–2)
BUN SERPL-MCNC: 27 MG/DL — HIGH (ref 7–23)
CALCIUM SERPL-MCNC: 7.6 MG/DL — LOW (ref 8.4–10.5)
CHLORIDE SERPL-SCNC: 109 MMOL/L — HIGH (ref 96–108)
CO2 SERPL-SCNC: 20 MMOL/L — LOW (ref 22–31)
CREAT SERPL-MCNC: 1.03 MG/DL — SIGNIFICANT CHANGE UP (ref 0.5–1.3)
GLUCOSE SERPL-MCNC: 148 MG/DL — HIGH (ref 70–99)
HCT VFR BLD CALC: 32.3 % — LOW (ref 39–50)
HGB BLD-MCNC: 10.4 G/DL — LOW (ref 13–17)
LYMPHOCYTES # BLD AUTO: 5.7 % — LOW (ref 13–44)
MCHC RBC-ENTMCNC: 29.6 PG — SIGNIFICANT CHANGE UP (ref 27–34)
MCHC RBC-ENTMCNC: 32.2 G/DL — SIGNIFICANT CHANGE UP (ref 32–36)
MCV RBC AUTO: 92 FL — SIGNIFICANT CHANGE UP (ref 80–100)
MONOCYTES NFR BLD AUTO: 8.4 % — SIGNIFICANT CHANGE UP (ref 2–14)
NEUTROPHILS NFR BLD AUTO: 85.8 % — HIGH (ref 43–77)
PLATELET # BLD AUTO: 184 K/UL — SIGNIFICANT CHANGE UP (ref 150–400)
POTASSIUM SERPL-MCNC: 4.8 MMOL/L — SIGNIFICANT CHANGE UP (ref 3.5–5.3)
POTASSIUM SERPL-SCNC: 4.8 MMOL/L — SIGNIFICANT CHANGE UP (ref 3.5–5.3)
RBC # BLD: 3.51 M/UL — LOW (ref 4.2–5.8)
RBC # FLD: 16.4 % — SIGNIFICANT CHANGE UP (ref 10.3–16.9)
SODIUM SERPL-SCNC: 138 MMOL/L — SIGNIFICANT CHANGE UP (ref 135–145)
WBC # BLD: 9.8 K/UL — SIGNIFICANT CHANGE UP (ref 3.8–10.5)
WBC # FLD AUTO: 9.8 K/UL — SIGNIFICANT CHANGE UP (ref 3.8–10.5)

## 2019-01-31 PROCEDURE — 22610 ARTHRD PST TQ 1NTRSPC THRC: CPT | Mod: 62,59

## 2019-01-31 PROCEDURE — 22610 ARTHRD PST TQ 1NTRSPC THRC: CPT | Mod: 59,62

## 2019-01-31 PROCEDURE — 22614 ARTHRD PST TQ 1NTRSPC EA ADD: CPT | Mod: 62

## 2019-01-31 PROCEDURE — 22853 INSJ BIOMECHANICAL DEVICE: CPT | Mod: 59

## 2019-01-31 PROCEDURE — 22558 ARTHRD ANT NTRBD MIN DSC LUM: CPT | Mod: 80

## 2019-01-31 PROCEDURE — 22849 REINSERT SPINAL FIXATION: CPT | Mod: 80

## 2019-01-31 PROCEDURE — 22612 ARTHRD PST TQ 1NTRSPC LUMBAR: CPT | Mod: 62

## 2019-01-31 PROCEDURE — 22853 INSJ BIOMECHANICAL DEVICE: CPT | Mod: 80

## 2019-01-31 PROCEDURE — 22849 REINSERT SPINAL FIXATION: CPT

## 2019-01-31 PROCEDURE — 22558 ARTHRD ANT NTRBD MIN DSC LUM: CPT | Mod: GC,62

## 2019-01-31 PROCEDURE — 22558 ARTHRD ANT NTRBD MIN DSC LUM: CPT | Mod: 62

## 2019-01-31 RX ORDER — BUPIVACAINE 13.3 MG/ML
20 INJECTION, SUSPENSION, LIPOSOMAL INFILTRATION ONCE
Qty: 0 | Refills: 0 | Status: DISCONTINUED | OUTPATIENT
Start: 2019-01-31 | End: 2019-02-06

## 2019-01-31 RX ORDER — INSULIN LISPRO 100/ML
0 VIAL (ML) SUBCUTANEOUS
Qty: 0 | Refills: 0 | COMMUNITY

## 2019-01-31 RX ORDER — LOSARTAN POTASSIUM 100 MG/1
25 TABLET, FILM COATED ORAL DAILY
Qty: 0 | Refills: 0 | Status: DISCONTINUED | OUTPATIENT
Start: 2019-01-31 | End: 2019-02-06

## 2019-01-31 RX ORDER — DIAZEPAM 5 MG
5 TABLET ORAL EVERY 8 HOURS
Qty: 0 | Refills: 0 | Status: DISCONTINUED | OUTPATIENT
Start: 2019-01-31 | End: 2019-02-06

## 2019-01-31 RX ORDER — CEFAZOLIN SODIUM 1 G
3000 VIAL (EA) INJECTION EVERY 8 HOURS
Qty: 0 | Refills: 0 | Status: COMPLETED | OUTPATIENT
Start: 2019-01-31 | End: 2019-02-01

## 2019-01-31 RX ORDER — DEXAMETHASONE 0.5 MG/5ML
1 ELIXIR ORAL
Qty: 0 | Refills: 0 | COMMUNITY

## 2019-01-31 RX ORDER — ALLOPURINOL 300 MG
100 TABLET ORAL DAILY
Qty: 0 | Refills: 0 | Status: DISCONTINUED | OUTPATIENT
Start: 2019-01-31 | End: 2019-02-06

## 2019-01-31 RX ORDER — HYDROMORPHONE HYDROCHLORIDE 2 MG/ML
1 INJECTION INTRAMUSCULAR; INTRAVENOUS; SUBCUTANEOUS
Qty: 0 | Refills: 0 | Status: DISCONTINUED | OUTPATIENT
Start: 2019-01-31 | End: 2019-02-01

## 2019-01-31 RX ORDER — DOCUSATE SODIUM 100 MG
100 CAPSULE ORAL THREE TIMES A DAY
Qty: 0 | Refills: 0 | Status: DISCONTINUED | OUTPATIENT
Start: 2019-01-31 | End: 2019-02-06

## 2019-01-31 RX ORDER — SODIUM CHLORIDE 9 MG/ML
1000 INJECTION, SOLUTION INTRAVENOUS
Qty: 0 | Refills: 0 | Status: DISCONTINUED | OUTPATIENT
Start: 2019-01-31 | End: 2019-02-03

## 2019-01-31 RX ORDER — NALOXONE HYDROCHLORIDE 4 MG/.1ML
0.1 SPRAY NASAL ONCE
Qty: 0 | Refills: 0 | Status: DISCONTINUED | OUTPATIENT
Start: 2019-01-31 | End: 2019-02-06

## 2019-01-31 RX ORDER — METOPROLOL TARTRATE 50 MG
25 TABLET ORAL DAILY
Qty: 0 | Refills: 0 | Status: DISCONTINUED | OUTPATIENT
Start: 2019-01-31 | End: 2019-01-31

## 2019-01-31 RX ORDER — METOPROLOL TARTRATE 50 MG
25 TABLET ORAL DAILY
Qty: 0 | Refills: 0 | Status: DISCONTINUED | OUTPATIENT
Start: 2019-01-31 | End: 2019-02-06

## 2019-01-31 RX ORDER — ATORVASTATIN CALCIUM 80 MG/1
80 TABLET, FILM COATED ORAL AT BEDTIME
Qty: 0 | Refills: 0 | Status: DISCONTINUED | OUTPATIENT
Start: 2019-02-01 | End: 2019-02-06

## 2019-01-31 RX ORDER — HYDROMORPHONE HYDROCHLORIDE 2 MG/ML
30 INJECTION INTRAMUSCULAR; INTRAVENOUS; SUBCUTANEOUS ONCE
Qty: 0 | Refills: 0 | Status: DISCONTINUED | OUTPATIENT
Start: 2019-01-31 | End: 2019-01-31

## 2019-01-31 RX ORDER — ONDANSETRON 8 MG/1
4 TABLET, FILM COATED ORAL EVERY 6 HOURS
Qty: 0 | Refills: 0 | Status: DISCONTINUED | OUTPATIENT
Start: 2019-01-31 | End: 2019-02-06

## 2019-01-31 RX ORDER — SENNA PLUS 8.6 MG/1
2 TABLET ORAL AT BEDTIME
Qty: 0 | Refills: 0 | Status: DISCONTINUED | OUTPATIENT
Start: 2019-01-31 | End: 2019-02-06

## 2019-01-31 RX ADMIN — HYDROMORPHONE HYDROCHLORIDE 30 MILLILITER(S): 2 INJECTION INTRAMUSCULAR; INTRAVENOUS; SUBCUTANEOUS at 20:42

## 2019-01-31 NOTE — BRIEF OPERATIVE NOTE - PROCEDURE
<<-----Click on this checkbox to enter Procedure Anterior lumbar interbody fusion (ALIF)  01/31/2019  via anterior spine exposure  Active  GANAND

## 2019-01-31 NOTE — BRIEF OPERATIVE NOTE - OPERATION/FINDINGS
Left paramedian incision. Anterior spine exposure of L5-S1. Left iliac artery and vein and ureter identified and protected.     Orthopedic surgery performed insertion of cage and screws into L5-S1 disc level.     They then proceeded with posterior approach for lumbar fusion. See their note for rest of operative details.
broken right S1 screw, broken rods, left L5 periprostatic fracture about the pedicle screw

## 2019-01-31 NOTE — BRIEF OPERATIVE NOTE - PRE-OP DX
Degenerative disc disease at L5-S1 level  01/31/2019    Active  Red Null Degenerative disc disease at L5-S1 level  01/31/2019    Active  Red Null  Mechanical complication of internal fixation device such as nail, plate or krishna, subsequent encounter  02/01/2019    Active  Aicha Godinez  S/P fusion of thoracic spine  02/01/2019    Aicha Gonzalez  S/P lumbar fusion  02/01/2019    Active  Aicha Godinez

## 2019-01-31 NOTE — CONSULT NOTE ADULT - SUBJECTIVE AND OBJECTIVE BOX
Patient is a 73y old  Male who presents with a chief complaint of Lower back pain (30 Jan 2019 14:08)        HPI:  74yo male presents with lumbar spine pain x  several years. The patient states he had spinal surgery in June 2018 but that the "rods broke." He denies preceding accident/injury or trauma to the back. He complains of pain to his low back and denies radiation of pain to his legs. He states that his legs are "sometimes tired." The patient states he has been ambulating with a cane for several years. The patient tried conservative therapies which failed to alleviate his symptoms. Denies numbness/tingling down the lower extremities and any bowel/bladder incontinence. Denies hx of DVT. The patient feels well today and denies chest pain/shortness of breath/fever/chills.   Presents for elective anterior lumbar interbody fusion L4-Sl with interbody cages and neuromonitoring.  Revision posterior spinal fusion T10-pelvis with instrumentation and neuromonitoring. (30 Jan 2019 14:08)     Lumbar radicular symptoms - weakness in both LE      Allergies  No Known Allergies      Health Issues  Z98.1 M43.89  No pertinent family history in first degree relatives  Family history of coronary artery disease  Handoff  PAD (peripheral artery disease)  Hypercholesterolemia  Hypertension  Degenerative disc disease at L5-S1 level  Degenerative disc disease at L5-S1 level  DM type 1 (diabetes mellitus, type 1)  PAD (peripheral artery disease)  Hypercholesterolemia  Hypertension  Spinal stenosis  Posterior spinal fusion  Anterior lumbar interbody fusion (ALIF)  History of lumbosacral spine surgery  H/O laminectomy  History of hernia repair  S/P hip replacement  S/P knee replacement, bilateral        FAMILY HISTORY:  No pertinent family history in first degree relatives      MEDICATIONS  (STANDING):  BUpivacaine liposome 1.3% Injectable (no eMAR) 20 milliLiter(s) Local Injection Once    MEDICATIONS  (PRN):      PAST MEDICAL & SURGICAL HISTORY:  PAD (peripheral artery disease)  Hypercholesterolemia  Hypertension  History of lumbosacral spine surgery  H/O laminectomy  History of hernia repair  S/P hip replacement: right hip  S/P knee replacement, bilateral      Labs              Radiology:    Physical Exam    MENTAL STATUS  -Level of Consciousness- awake    Orientation- person, place time  Language- aphasia/ dysarthria- nl  Memory- recent and remote- nl      Cranial Nerve 1- 12  Pupils- equal and reactive  Eye movements- full  Facial - no asymmetry   Lower CN-nl    Gait and Station- unsteady    MOTOR  Upper-nl  Lower- no foot drop    Reflexes- decreased    Sensation- no sensory level    Cerebellar- no tremors    vascular - no bruits    Assessment- Lumbar radiculopathy    Plan  Surgery as oer Dr Arredondo- no myelopathy

## 2019-01-31 NOTE — PROGRESS NOTE ADULT - SUBJECTIVE AND OBJECTIVE BOX
Ortho Post Op Check    Procedure: ALIF L4-S1 and PSF T10-pelvis  Surgeon: Arnulfo    Pt comfortable without complaints, pain controlled  Denies CP, SOB, N/V, numbness/tingling     Vital Signs Last 24 Hrs  T(C): --  T(F): --  HR: 88 (01-31-19 @ 19:43) (88 - 90)  BP: 124/68 (01-31-19 @ 19:43) (97/55 - 135/70)  BP(mean): 78 (01-31-19 @ 19:43) (68 - 87)  RR: 15 (01-31-19 @ 19:43) (15 - 16)  SpO2: 95% (01-31-19 @ 19:43) (95% - 97%)    General: Pt Alert and oriented, NAD  Back DSG C/D/I  HVx2 and JPx2 intact holding good suction  Pulses: 2+ DP/PT  Sensation: s/s/sp/dp/t intact  Motor: EHL/FHL/TA/GS 5/5      Post-op X-Ray: intraop fluoro     A/P: 73yMale POD#0 s/p ALIF L4-S1 and PSF T10-pelvis  - Stable  - Pain Control  - DVT ppx: SCDs  - Post op abx: ancef periop  - PT, WBS: WBAT  - f/u AM labs  - cardio c/s  - f/u ECHO  - dispo pending     Ortho Pager [phone]

## 2019-02-01 ENCOUNTER — TRANSCRIPTION ENCOUNTER (OUTPATIENT)
Age: 74
End: 2019-02-01

## 2019-02-01 DIAGNOSIS — G47.33 OBSTRUCTIVE SLEEP APNEA (ADULT) (PEDIATRIC): ICD-10-CM

## 2019-02-01 LAB
ANION GAP SERPL CALC-SCNC: 9 MMOL/L — SIGNIFICANT CHANGE UP (ref 5–17)
BASOPHILS NFR BLD AUTO: 0.1 % — SIGNIFICANT CHANGE UP (ref 0–2)
BUN SERPL-MCNC: 25 MG/DL — HIGH (ref 7–23)
CALCIUM SERPL-MCNC: 7.7 MG/DL — LOW (ref 8.4–10.5)
CHLORIDE SERPL-SCNC: 110 MMOL/L — HIGH (ref 96–108)
CO2 SERPL-SCNC: 24 MMOL/L — SIGNIFICANT CHANGE UP (ref 22–31)
CREAT SERPL-MCNC: 1.03 MG/DL — SIGNIFICANT CHANGE UP (ref 0.5–1.3)
EOSINOPHIL NFR BLD AUTO: 0.6 % — SIGNIFICANT CHANGE UP (ref 0–6)
GLUCOSE SERPL-MCNC: 131 MG/DL — HIGH (ref 70–99)
HCT VFR BLD CALC: 27.4 % — LOW (ref 39–50)
HCT VFR BLD CALC: 31.3 % — LOW (ref 39–50)
HGB BLD-MCNC: 10 G/DL — LOW (ref 13–17)
HGB BLD-MCNC: 8.9 G/DL — LOW (ref 13–17)
LYMPHOCYTES # BLD AUTO: 11.3 % — LOW (ref 13–44)
MCHC RBC-ENTMCNC: 29.4 PG — SIGNIFICANT CHANGE UP (ref 27–34)
MCHC RBC-ENTMCNC: 30.4 PG — SIGNIFICANT CHANGE UP (ref 27–34)
MCHC RBC-ENTMCNC: 31.9 G/DL — LOW (ref 32–36)
MCHC RBC-ENTMCNC: 32.5 G/DL — SIGNIFICANT CHANGE UP (ref 32–36)
MCV RBC AUTO: 92.1 FL — SIGNIFICANT CHANGE UP (ref 80–100)
MCV RBC AUTO: 93.5 FL — SIGNIFICANT CHANGE UP (ref 80–100)
MONOCYTES NFR BLD AUTO: 11.8 % — SIGNIFICANT CHANGE UP (ref 2–14)
NEUTROPHILS NFR BLD AUTO: 76.2 % — SIGNIFICANT CHANGE UP (ref 43–77)
PLATELET # BLD AUTO: 168 K/UL — SIGNIFICANT CHANGE UP (ref 150–400)
PLATELET # BLD AUTO: 195 K/UL — SIGNIFICANT CHANGE UP (ref 150–400)
POTASSIUM SERPL-MCNC: 4.3 MMOL/L — SIGNIFICANT CHANGE UP (ref 3.5–5.3)
POTASSIUM SERPL-SCNC: 4.3 MMOL/L — SIGNIFICANT CHANGE UP (ref 3.5–5.3)
RBC # BLD: 2.93 M/UL — LOW (ref 4.2–5.8)
RBC # BLD: 3.4 M/UL — LOW (ref 4.2–5.8)
RBC # FLD: 16.7 % — SIGNIFICANT CHANGE UP (ref 10.3–16.9)
RBC # FLD: 16.8 % — SIGNIFICANT CHANGE UP (ref 10.3–16.9)
SODIUM SERPL-SCNC: 143 MMOL/L — SIGNIFICANT CHANGE UP (ref 135–145)
WBC # BLD: 11 K/UL — HIGH (ref 3.8–10.5)
WBC # BLD: 8.9 K/UL — SIGNIFICANT CHANGE UP (ref 3.8–10.5)
WBC # FLD AUTO: 11 K/UL — HIGH (ref 3.8–10.5)
WBC # FLD AUTO: 8.9 K/UL — SIGNIFICANT CHANGE UP (ref 3.8–10.5)

## 2019-02-01 PROCEDURE — 99233 SBSQ HOSP IP/OBS HIGH 50: CPT | Mod: GC

## 2019-02-01 PROCEDURE — 93306 TTE W/DOPPLER COMPLETE: CPT | Mod: 26

## 2019-02-01 PROCEDURE — 99222 1ST HOSP IP/OBS MODERATE 55: CPT

## 2019-02-01 RX ORDER — DEXTROSE 50 % IN WATER 50 %
15 SYRINGE (ML) INTRAVENOUS ONCE
Qty: 0 | Refills: 0 | Status: DISCONTINUED | OUTPATIENT
Start: 2019-02-01 | End: 2019-02-06

## 2019-02-01 RX ORDER — DEXTROSE 50 % IN WATER 50 %
12.5 SYRINGE (ML) INTRAVENOUS ONCE
Qty: 0 | Refills: 0 | Status: DISCONTINUED | OUTPATIENT
Start: 2019-02-01 | End: 2019-02-06

## 2019-02-01 RX ORDER — INSULIN LISPRO 100/ML
VIAL (ML) SUBCUTANEOUS
Qty: 0 | Refills: 0 | Status: DISCONTINUED | OUTPATIENT
Start: 2019-02-01 | End: 2019-02-06

## 2019-02-01 RX ORDER — ACETAMINOPHEN 500 MG
1000 TABLET ORAL ONCE
Qty: 0 | Refills: 0 | Status: COMPLETED | OUTPATIENT
Start: 2019-02-01 | End: 2019-02-01

## 2019-02-01 RX ORDER — DEXTROSE 50 % IN WATER 50 %
25 SYRINGE (ML) INTRAVENOUS ONCE
Qty: 0 | Refills: 0 | Status: DISCONTINUED | OUTPATIENT
Start: 2019-02-01 | End: 2019-02-06

## 2019-02-01 RX ORDER — SODIUM CHLORIDE 9 MG/ML
500 INJECTION INTRAMUSCULAR; INTRAVENOUS; SUBCUTANEOUS ONCE
Qty: 0 | Refills: 0 | Status: COMPLETED | OUTPATIENT
Start: 2019-02-01 | End: 2019-02-01

## 2019-02-01 RX ORDER — BENZOCAINE AND MENTHOL 5; 1 G/100ML; G/100ML
1 LIQUID ORAL THREE TIMES A DAY
Qty: 0 | Refills: 0 | Status: DISCONTINUED | OUTPATIENT
Start: 2019-02-01 | End: 2019-02-06

## 2019-02-01 RX ORDER — GLUCAGON INJECTION, SOLUTION 0.5 MG/.1ML
1 INJECTION, SOLUTION SUBCUTANEOUS ONCE
Qty: 0 | Refills: 0 | Status: DISCONTINUED | OUTPATIENT
Start: 2019-02-01 | End: 2019-02-06

## 2019-02-01 RX ORDER — HYDROMORPHONE HYDROCHLORIDE 2 MG/ML
30 INJECTION INTRAMUSCULAR; INTRAVENOUS; SUBCUTANEOUS ONCE
Qty: 0 | Refills: 0 | Status: DISCONTINUED | OUTPATIENT
Start: 2019-02-01 | End: 2019-02-02

## 2019-02-01 RX ORDER — SODIUM CHLORIDE 9 MG/ML
1000 INJECTION, SOLUTION INTRAVENOUS
Qty: 0 | Refills: 0 | Status: DISCONTINUED | OUTPATIENT
Start: 2019-02-01 | End: 2019-02-03

## 2019-02-01 RX ADMIN — LOSARTAN POTASSIUM 25 MILLIGRAM(S): 100 TABLET, FILM COATED ORAL at 06:48

## 2019-02-01 RX ADMIN — HYDROMORPHONE HYDROCHLORIDE 1 MILLIGRAM(S): 2 INJECTION INTRAMUSCULAR; INTRAVENOUS; SUBCUTANEOUS at 17:04

## 2019-02-01 RX ADMIN — Medication 100 MILLIGRAM(S): at 06:48

## 2019-02-01 RX ADMIN — Medication 200 MILLIGRAM(S): at 00:08

## 2019-02-01 RX ADMIN — SODIUM CHLORIDE 2000 MILLILITER(S): 9 INJECTION INTRAMUSCULAR; INTRAVENOUS; SUBCUTANEOUS at 23:15

## 2019-02-01 RX ADMIN — SODIUM CHLORIDE 100 MILLILITER(S): 9 INJECTION, SOLUTION INTRAVENOUS at 22:52

## 2019-02-01 RX ADMIN — BENZOCAINE AND MENTHOL 1 LOZENGE: 5; 1 LIQUID ORAL at 22:44

## 2019-02-01 RX ADMIN — ATORVASTATIN CALCIUM 80 MILLIGRAM(S): 80 TABLET, FILM COATED ORAL at 21:42

## 2019-02-01 RX ADMIN — Medication 100 MILLIGRAM(S): at 21:42

## 2019-02-01 RX ADMIN — SENNA PLUS 2 TABLET(S): 8.6 TABLET ORAL at 21:42

## 2019-02-01 RX ADMIN — Medication 400 MILLIGRAM(S): at 18:48

## 2019-02-01 RX ADMIN — Medication 100 MILLIGRAM(S): at 14:13

## 2019-02-01 RX ADMIN — Medication 200 MILLIGRAM(S): at 06:48

## 2019-02-01 RX ADMIN — Medication 25 MILLIGRAM(S): at 06:48

## 2019-02-01 RX ADMIN — HYDROMORPHONE HYDROCHLORIDE 1 MILLIGRAM(S): 2 INJECTION INTRAMUSCULAR; INTRAVENOUS; SUBCUTANEOUS at 19:31

## 2019-02-01 NOTE — PROGRESS NOTE ADULT - SUBJECTIVE AND OBJECTIVE BOX
Neurology Follow up note    Name  NANDO HERNANDEZ    HPI:  72yo male presents with lumbar spine pain x  several years. The patient states he had spinal surgery in June 2018 but that the "rods broke." He denies preceding accident/injury or trauma to the back. He complains of pain to his low back and denies radiation of pain to his legs. He states that his legs are "sometimes tired." The patient states he has been ambulating with a cane for several years. The patient tried conservative therapies which failed to alleviate his symptoms. Denies numbness/tingling down the lower extremities and any bowel/bladder incontinence. Denies hx of DVT. The patient feels well today and denies chest pain/shortness of breath/fever/chills.   Presents for elective anterior lumbar interbody fusion L4-Sl with interbody cages and neuromonitoring.  Revision posterior spinal fusion T10-pelvis with instrumentation and neuromonitoring. (30 Jan 2019 14:08)      Interval History - back pain - no new radicular symptoms in the LE   Numbness in the hands        REVIEW OF SYSTEMS    Vital Signs Last 24 Hrs  T(C): 36.2 (01 Feb 2019 05:02), Max: 36.6 (31 Jan 2019 23:37)  T(F): 97.2 (01 Feb 2019 05:02), Max: 97.8 (31 Jan 2019 23:37)  HR: 96 (01 Feb 2019 06:02) (82 - 96)  BP: 131/58 (01 Feb 2019 05:02) (97/55 - 135/70)  BP(mean): 69 (31 Jan 2019 23:37) (66 - 92)  RR: 16 (01 Feb 2019 06:02) (15 - 21)  SpO2: 99% (01 Feb 2019 06:02) (95% - 100%)    Physical Exam-     Mental Status-    Cranial Nerves- full EOM    Gait and station- n/a    Motor- no new focal weakness - reports slight swelling in the hands    Reflexes- decrease LE    Sensation- no sensory level    Coordination- no tremors    Vascular - no bruits    Medications  allopurinol 100 milliGRAM(s) Oral daily  atorvastatin 80 milliGRAM(s) Oral at bedtime  BUpivacaine liposome 1.3% Injectable (no eMAR) 20 milliLiter(s) Local Injection Once  diazepam    Tablet 5 milliGRAM(s) Oral every 8 hours PRN  docusate sodium 100 milliGRAM(s) Oral three times a day  HYDROmorphone PCA (1 mG/mL) Rescue Clinician Bolus 1 milliGRAM(s) IV Push every 2 hours PRN  lactated ringers. 1000 milliLiter(s) IV Continuous <Continuous>  losartan 25 milliGRAM(s) Oral daily  metoprolol succinate ER 25 milliGRAM(s) Oral daily  naloxone Injectable 0.1 milliGRAM(s) IV Push Once PRN  ondansetron Injectable 4 milliGRAM(s) IV Push every 6 hours PRN  senna 2 Tablet(s) Oral at bedtime      Lab      Radiology    Assessment- Lumbar radiculopathy      Plan Swelling in the hands positional - no w/o at present

## 2019-02-01 NOTE — PROGRESS NOTE ADULT - SUBJECTIVE AND OBJECTIVE BOX
Ortho Note    Post-operative day #1 s/p ALIF L5-S1, revision PSF T10-pelvis    Subjective:     Patient seen and examined. Patient comfortable without complaints, pain controlled. Patient states he was having some numbness/tingling in his right thumb earlier, resolved now.   Denies chest pain, SOB, N/V, numbness/tingling.    Objective:    Vital Signs Last 24 Hrs  T(C): 36.8 (02-01-19 @ 08:20), Max: 36.8 (02-01-19 @ 08:20)  T(F): 98.3 (02-01-19 @ 08:20), Max: 98.3 (02-01-19 @ 08:20)  HR: 108 (02-01-19 @ 11:38) (100 - 109)  BP: 101/54 (02-01-19 @ 11:38) (101/54 - 119/55)  BP(mean): 79 (02-01-19 @ 10:02) (79 - 79)  RR: 18 (02-01-19 @ 10:02) (16 - 18)  SpO2: 97% (02-01-19 @ 10:02) (95% - 97%)  AVSS    PE:  General: Patient alert and oriented, NAD  Dressing: C/D/I back HV x 2 and KARON x 2; dressing C/D/I abdomen   Pulses: DP palpable bilateral lower extremities   Sensation: intact and equal to bilateral upper extremities. Intact and equal to bilateral upper extremities   Motor: EHL/FHL/TA/GS 5/5 bilateral lower extremities                           10.0   8.9   )-----------( 195      ( 01 Feb 2019 07:41 )             31.3   01 Feb 2019 07:41    143    |  110    |  25     ----------------------------<  131    4.3     |  24     |  1.03     Ca    7.7        01 Feb 2019 07:41        A/P: 73yMale POD#1 s/p ALIF L5-S1, revision PSF T10-pelvis   1. Pain control as needed  2. DVT prophylaxis:SCDx  3. PT, WBS: WBAT  4. Advanced to clear liquid diet today  5. Needs to mobilize with PT today  6. TLSO brace ordered  7. f/u echo/cardiology consult  8. Dispo:    Ortho Pager [phone] Ortho Note    Post-operative day #1 s/p ALIF L5-S1, revision PSF T10-pelvis    Subjective:     Patient seen and examined. Patient comfortable without complaints, pain controlled. Patient states he was having some numbness/tingling in his right thumb earlier, resolved now.   Denies chest pain, SOB, N/V, numbness/tingling.    Objective:    Vital Signs Last 24 Hrs  T(C): 36.8 (02-01-19 @ 08:20), Max: 36.8 (02-01-19 @ 08:20)  T(F): 98.3 (02-01-19 @ 08:20), Max: 98.3 (02-01-19 @ 08:20)  HR: 108 (02-01-19 @ 11:38) (100 - 109)  BP: 101/54 (02-01-19 @ 11:38) (101/54 - 119/55)  BP(mean): 79 (02-01-19 @ 10:02) (79 - 79)  RR: 18 (02-01-19 @ 10:02) (16 - 18)  SpO2: 97% (02-01-19 @ 10:02) (95% - 97%)  AVSS    PE:  General: Patient alert and oriented, NAD  Dressing: C/D/I back HV x 2 and KARON x 2; dressing C/D/I abdomen   Pulses: DP palpable bilateral lower extremities   Sensation: intact and equal to bilateral upper extremities. Intact and equal to bilateral upper extremities   Motor: EHL/FHL/TA/GS 5/5 bilateral lower extremities                           10.0   8.9   )-----------( 195      ( 01 Feb 2019 07:41 )             31.3   01 Feb 2019 07:41    143    |  110    |  25     ----------------------------<  131    4.3     |  24     |  1.03     Ca    7.7        01 Feb 2019 07:41    BUN 25 - per Dr. White's recommendations (cardiology) can encourage PO versus give 1 L IVF. Cr stable.     A/P: 73yMale POD#1 s/p ALIF L5-S1, revision PSF T10-pelvis   1. Pain control as needed  2. DVT prophylaxis:SCDx  3. PT, WBS: WBAT  4. Advanced to clear liquid diet today  5. Needs to mobilize with PT today  6. TLSO brace ordered  7. Per Dr. White, echo showing preserved EG normal diastolic fxn  8. encourage PO fluid intake   8. Dispo: pending     Ortho Pager [phone]

## 2019-02-01 NOTE — PHYSICAL THERAPY INITIAL EVALUATION ADULT - ADDITIONAL COMMENTS
Pt would use a cane occasionally. Pt lives in an accessible home 2/2 wife being disabled. Pt typically takes care of his wife, no HHA.

## 2019-02-01 NOTE — DISCHARGE NOTE ADULT - MEDICATION SUMMARY - MEDICATIONS TO TAKE
I will START or STAY ON the medications listed below when I get home from the hospital:    oxyCODONE 5 mg oral tablet  -- 1 tab(s) by mouth every 4 hours, As needed, Moderate Pain (4 - 6)  -- Indication: For Moderate Pain    oxyCODONE 10 mg oral tablet  -- 1 tab(s) by mouth every 4 hours, As needed, Severe Pain (7 - 10)  -- Indication: For Severe Pain    losartan 25 mg oral tablet  --  1 tab by mouth daily   -- Indication: For Hypertension    enoxaparin  -- 40 milligram(s) subcutaneous once a day; DISCONTINUE WHEN PT DC FROM GAIL  -- Indication: For DVT prophylaxis    diazePAM 5 mg oral tablet  -- 1 tab(s) by mouth every 8 hours, As needed, muscle spasm  -- Indication: For History of lumbosacral spine surgery    allopurinol 100 mg oral tablet  -- 1 tab(s) by mouth once a day  -- Indication: For Gout    Crestor 20 mg oral tablet  -- 1 tab(s) by mouth once a day (at bedtime)  -- Indication: For Hypercholesterolemia    metoprolol tartrate 25 mg oral tablet  -- 1 tab(s) by mouth every 12 hours  -- Indication: For Hypertension    docusate sodium 100 mg oral capsule  -- 1 cap(s) by mouth 3 times a day  -- Indication: For Bowel Regimen    cyclobenzaprine 5 mg oral tablet  -- 1 tab(s) by mouth 3 times a day, As needed, Muscle Spasm  -- Indication: For History of lumbosacral spine surgery

## 2019-02-01 NOTE — PHYSICAL THERAPY INITIAL EVALUATION ADULT - ONSET DATE, REHAB EVAL
September 11, 2017    Melba Miles  [address]  [facility] [address]      Dear Melba,    I am glad to inform you that the results of your recent pap smear taken in the office were reported negative, and there was no evidence of cancer.    You are encouraged to make an appointment  in 12 months to see your provider for an annual exam.    Sincerely,          Viktoria Padilla / America Bunch,     [facility]  [facility]  [address], [address]  [place] [address]  T [phone]  F [phone]  [email or web address]       31-Jan-2019

## 2019-02-01 NOTE — CONSULT NOTE ADULT - SUBJECTIVE AND OBJECTIVE BOX
REASON FOR CONSULT:    HISTORY OF PRESENT ILLNESS:    72yo male presents with lumbar spine pain x  several years. The patient states he had spinal surgery in June 2018 but that the "rods broke." He denies preceding accident/injury or trauma to the back. He complains of pain to his low back and denies radiation of pain to his legs. He states that his legs are "sometimes tired." The patient states he has been ambulating with a cane for several years. The patient tried conservative therapies which failed to alleviate his symptoms. Denies numbness/tingling down the lower extremities and any bowel/bladder incontinence. Denies hx of DVT. The patient feels well today and denies chest pain/shortness of breath/fever/chills.   Presents for elective anterior lumbar interbody fusion L4-Sl with interbody cages and neuromonitoring.  Revision posterior spinal fusion T10-pelvis with instrumentation and neuromonitoring.       PAST MEDICAL & SURGICAL HISTORY:  PAD (peripheral artery disease)  Hypercholesterolemia  Hypertension  History of lumbosacral spine surgery  H/O laminectomy  History of hernia repair  S/P hip replacement: right hip  S/P knee replacement, bilateral      [ ] Diabetes   [ ] Hypertension  [ ] Hyperlipidemia  [ ] CAD  [ ] PCI  [ ] CABG    PREVIOUS DIAGNOSTIC TESTING:    [ ] Echocardiogram:  [ ]  Catheterization:  [ ] Stress Test:  	    MEDICATIONS:  losartan 25 milliGRAM(s) Oral daily  metoprolol succinate ER 25 milliGRAM(s) Oral daily        diazepam    Tablet 5 milliGRAM(s) Oral every 8 hours PRN  HYDROmorphone PCA (1 mG/mL) Rescue Clinician Bolus 1 milliGRAM(s) IV Push every 2 hours PRN  ondansetron Injectable 4 milliGRAM(s) IV Push every 6 hours PRN    docusate sodium 100 milliGRAM(s) Oral three times a day  senna 2 Tablet(s) Oral at bedtime    allopurinol 100 milliGRAM(s) Oral daily  atorvastatin 80 milliGRAM(s) Oral at bedtime    lactated ringers. 1000 milliLiter(s) IV Continuous <Continuous>      FAMILY HISTORY:  No pertinent family history in first degree relatives      SOCIAL HISTORY:    [ x] Non-smoker  [ ] Smoker  [ ] Alcohol    FAMILY HX: NC    Allergies    No Known Allergies    Intolerances    	    REVIEW OF SYSTEMS:    [x] as per HPI  CONSTITUTIONAL: No fever, weight loss, or fatigue  ENT:  No difficulty hearing, tinnitus, vertigo; No sinus or throat pain  RESPIRATORY: No cough, wheezing, chills or hemoptysis; No Shortness of Breath  CARDIOVASCULAR: No chest pain, palpitations, dizziness, or leg swelling  GASTROINTESTINAL: No abdominal or epigastric pain. No nausea, vomiting, or hematemesis; No diarrhea or constipation. No melena or hematochezia.  GENITOURINARY: No dysuria, frequency, hematuria, or incontinence  NEUROLOGICAL: No headaches, memory loss, loss of strength, numbness, or tremors  MUSCULOSKELETAL: No joint pain or swelling; No muscle, back, or extremity pain  [x] All others negative	  [ ] Unable to obtain    PHYSICAL EXAM:  T(C): 36.8 (02-01-19 @ 08:20), Max: 36.8 (02-01-19 @ 08:20)  HR: 108 (02-01-19 @ 11:38) (82 - 109)  BP: 101/54 (02-01-19 @ 11:38) (97/55 - 135/70)  RR: 18 (02-01-19 @ 10:02) (15 - 21)  SpO2: 97% (02-01-19 @ 10:02) (95% - 100%)  Wt(kg): --  I&O's Summary    31 Jan 2019 07:01  -  01 Feb 2019 07:00  --------------------------------------------------------  IN: 1200 mL / OUT: 1642.5 mL / NET: -442.5 mL    01 Feb 2019 07:01  -  01 Feb 2019 13:15  --------------------------------------------------------  IN: 0 mL / OUT: 575 mL / NET: -575 mL        Appearance: Normal	  HEENT:   Normal oral mucosa, PERRL, EOMI	  Lymphatic: No lymphadenopathy  Cardiovascular: Normal S1 S2, No JVD, No murmurs, No edema  Respiratory: Lungs clear to auscultation	  Psychiatry: A & O x 3, Mood & affect appropriate  Gastrointestinal:  Soft, Non-tender, + BS	  Skin: No rashes, No ecchymoses, No cyanosis	  Neurologic: Non-focal  Extremities: Normal range of motion, No clubbing, cyanosis or edema  Vascular: Peripheral pulses palpable 2+ bilaterally    TELEMETRY: 	    ECG:  < from: 12 Lead ECG (06.01.14 @ 20:31) >  Diagnosis Line Sinus rhythm with Premature atrial complexes  Otherwise normal ECG    < end of copied text >    ECHO: < from: TTE Echo w/Cont Complete (02.01.19 @ 11:46) >  Technically difficult study with poor endocardial delineation,   echocontrast   employed to better assess endocardium.   The left ventricular cavity   size is   normal, normal wall thickness and global systolic function. Calculated   LVEF   62% by Avila's biplane method. No regional wall motionabnormalities.   Diastolic indices are suggestive of normal diastolic function.The right   ventricle appears to be normal in size and systolic function.Normal   biatrial   size.All valves appears structrually and functionally normal. Trace   TR.Normal   IVC size with respirophasic variation.No pericardial effusion.Compared to   prior echocardiogram of 7/3/2018, overall similar findings.    < end of copied text >    STRESS:   CATH:   	  RADIOLOGY:  CXR: < from: Xray Chest 1 View- PORTABLE-Urgent (07.06.18 @ 12:51) >  IMPRESSION:  The lungs are clear.    < end of copied text >    CT:  US:   	  	  LABS:	 	    CARDIAC MARKERS:                                  10.0   8.9   )-----------( 195      ( 01 Feb 2019 07:41 )             31.3     02-01    143  |  110<H>  |  25<H>  ----------------------------<  131<H>  4.3   |  24  |  1.03    Ca    7.7<L>      01 Feb 2019 07:41      proBNP:   Lipid Profile:   HgA1c:   TSH:     ASSESSMENT/PLAN: 	    # post op s complications  mild tachycardia - anxious, pain, dehydration  BUN 25 - can encourage PO versus give 1 L IVF, Cr stable   Echo preserved EG normal diasolic fxn    #CAD - ekg non ischemic  echo normal EF normal pressures, unchanged  no cp sob palp      #HTN - at goal, pain control   losartan 25 milliGRAM(s) Oral daily  metoprolol succinate ER 25 milliGRAM(s) Oral daily

## 2019-02-01 NOTE — DISCHARGE NOTE ADULT - HOSPITAL COURSE
Admitted to orthopaedic service Dr. Arredondo   Surgery 1/31/19 Lower back pain/anterior lumbar interbody fusion L5-S1, revision posterior spinal fusion T10-pelvis  Justine-op Antibiotics  Pain control  DVT prophylaxis  OOB/Physical Therapy   Cardiology consult Dr. White, pain management consult Admitted to orthopaedic service Dr. Arredondo   Surgery 1/31/19 Lower back pain/anterior lumbar interbody fusion L5-S1, revision posterior spinal fusion T10-pelvis  Justine-op Antibiotics  Pain control  DVT prophylaxis  OOB/Physical Therapy   Cardiology consult Dr. White, pain management consult  Plastic surgery closure with Dr. Trevino

## 2019-02-01 NOTE — PROGRESS NOTE ADULT - ASSESSMENT
A/P: Pt doing well POD#1 after back closure   1. Deep drains (hemovacs) as per ortho/neurosurgery  2. Superficial drains to be kept in until <20mL/24 hours. At least one KARON should be kept in to be removed in office after discharge.

## 2019-02-01 NOTE — DISCHARGE NOTE ADULT - PATIENT PORTAL LINK FT
You can access the [facility] Portal, offered by [facility], by registering with the following website: [email or web address]

## 2019-02-01 NOTE — PACU DISCHARGE NOTE - COMMENTS
Patient is s/p Posterior spinal fusion  01/31/2019  revision, T10-pelvis    Anterior lumbar interbody fusion (ALIF)  01/31/2019  via anterior spine exposure, L5/S1    Patient is hemodynamically stable and meets PACU discharge criteria.  Report given to unit RN.  Patient transported via monitored bed to unit. Accompanied by RN and CNA.

## 2019-02-01 NOTE — DISCHARGE NOTE ADULT - CARE PROVIDER_API CALL
Stevenson Arredondo)  Orthopedics  [address] [address]  Phone: [phone]  Fax: [phone] Stevenson Arredondo)  Orthopedics  [address] [address]  Phone: [phone]  Fax: [phone]  Follow Up Time:     Brandon Trevino)  Plastic Surgery Surgery  [address] [place] [address]  Phone: [phone]  Fax: [phone]  Follow Up Time:

## 2019-02-01 NOTE — PROGRESS NOTE ADULT - SUBJECTIVE AND OBJECTIVE BOX
Ortho Note    Pt comfortable without complaints, pain controlled  Denies CP, SOB, N/V, numbness/tingling     Vital Signs Last 24 Hrs  T(C): 36.8 (02-01-19 @ 08:20), Max: 36.8 (02-01-19 @ 08:20)  T(F): 98.3 (02-01-19 @ 08:20), Max: 98.3 (02-01-19 @ 08:20)  HR: 100 (02-01-19 @ 09:11) (95 - 102)  BP: 110/53 (02-01-19 @ 08:20) (110/53 - 131/58)  BP(mean): --  RR: 17 (02-01-19 @ 09:11) (15 - 17)  SpO2: 96% (02-01-19 @ 09:11) (95% - 99%)    General: Pt Alert and oriented, NAD  Back DSG C/D/I  HVx2 and JPx2 intact holding good suction  Pulses: 2+ DP/PT  Sensation: s/s/sp/dp/t intact  Motor: EHL/FHL/TA/GS 5/5                          10.0   8.9   )-----------( 195      ( 01 Feb 2019 07:41 )             31.3   01 Feb 2019 07:41    143    |  110    |  25     ----------------------------<  131    4.3     |  24     |  1.03     Ca    7.7        01 Feb 2019 07:41    Drain output:    01-31-19 @ 07:01  -  02-01-19 @ 07:00  --------------------------------------------------------  OUT:    Accordian: 220 mL    Accordian: 290 mL    Bulb: 45 mL    Bulb: 2.5 mL  Total OUT: 557.5 mL      02-01-19 @ 07:01  -  02-01-19 @ 10:01  --------------------------------------------------------  OUT:    Accordian: 80 mL    Accordian: 80 mL    Bulb: 10 mL    Bulb: 5 mL  Total OUT: 175 mL    A/P: 73M s/p ALIF L4-S1 and PSF T10-pelvis  - Stable  - Pain Control  - DVT ppx: SCDs  - PT, WBS: WBAT  - f/u AM labs  - cardio c/s  - f/u ECHO  - dispo pending    Ortho Pager [phone]

## 2019-02-01 NOTE — PROGRESS NOTE ADULT - ASSESSMENT
73 M POD1 s/p ALIF L5-S1, revision PSF T10-pelvis    -Pt denies bm, denies flatus. Will monitor  -Discussed with chief and vascular will follow

## 2019-02-01 NOTE — PATIENT PROFILE ADULT - VISION (WITH CORRECTIVE LENSES IF THE PATIENT USUALLY WEARS THEM):
Partially impaired: cannot see medication labels or newsprint, but can see obstacles in path, and the surrounding layout; can count fingers at arm's length/wears glasses
Normal vision: sees adequately in most situations; can see medication labels, newsprint

## 2019-02-01 NOTE — DISCHARGE NOTE ADULT - PLAN OF CARE
improve function, decrease pain No strenuous activity (bending/twisting), heavy lifting, driving or returning to work until cleared by MD.  Change dressing daily with gauze/tape till post-op day #5, then leave incision open to air.  You may shower post-op day#5, keep incision clean and dry.   Try to have regular bowel movements, take stool softener or laxative if necessary.  May take pepcid or zantac for upset stomach.  May apply ice to affected areas to decrease swelling.  Call to schedule an appt with Dr. Arredondo for follow up, if you have staples or sutures they will be removed in office.  Contact your doctor if you experience: fever greater than 101.5, chills, chest pain, difficulty breathing, redness or excessive drainage around the incision, other concerns.  Follow up with your primary care provider. No strenuous activity (bending/twisting), heavy lifting, driving or returning to work until cleared by MD.  Change dressing daily with gauze/tape till post-op day #5, then leave incision open to air.  You may shower post-op day#5, keep incision clean and dry.   Try to have regular bowel movements, take stool softener or laxative if necessary.  May take pepcid or zantac for upset stomach.  May apply ice to affected areas to decrease swelling.  Call to schedule an appt with Dr. Arredondo for follow up, if you have staples or sutures they will be removed in office.  Contact your doctor if you experience: fever greater than 101.5, chills, chest pain, difficulty breathing, redness or excessive drainage around the incision, other concerns.  Follow up with your primary care provider.    Follow-up in Dr Trevino's office after discharge [phone] -call to make an appointment   Ok to shower 2 days after KARON drain has been removed - 2/8/19

## 2019-02-01 NOTE — DISCHARGE NOTE ADULT - CARE PLAN
Principal Discharge DX:	Spinal stenosis  Goal:	improve function, decrease pain  Assessment and plan of treatment:	No strenuous activity (bending/twisting), heavy lifting, driving or returning to work until cleared by MD.  Change dressing daily with gauze/tape till post-op day #5, then leave incision open to air.  You may shower post-op day#5, keep incision clean and dry.   Try to have regular bowel movements, take stool softener or laxative if necessary.  May take pepcid or zantac for upset stomach.  May apply ice to affected areas to decrease swelling.  Call to schedule an appt with Dr. Arredondo for follow up, if you have staples or sutures they will be removed in office.  Contact your doctor if you experience: fever greater than 101.5, chills, chest pain, difficulty breathing, redness or excessive drainage around the incision, other concerns.  Follow up with your primary care provider. Principal Discharge DX:	Spinal stenosis  Goal:	improve function, decrease pain  Assessment and plan of treatment:	No strenuous activity (bending/twisting), heavy lifting, driving or returning to work until cleared by MD.  Change dressing daily with gauze/tape till post-op day #5, then leave incision open to air.  You may shower post-op day#5, keep incision clean and dry.   Try to have regular bowel movements, take stool softener or laxative if necessary.  May take pepcid or zantac for upset stomach.  May apply ice to affected areas to decrease swelling.  Call to schedule an appt with Dr. Arredondo for follow up, if you have staples or sutures they will be removed in office.  Contact your doctor if you experience: fever greater than 101.5, chills, chest pain, difficulty breathing, redness or excessive drainage around the incision, other concerns.  Follow up with your primary care provider.    Follow-up in Dr Trevino's office after discharge [phone] -call to make an appointment   Ok to shower 2 days after KARON drain has been removed - 2/8/19

## 2019-02-01 NOTE — DISCHARGE NOTE ADULT - MEDICATION SUMMARY - MEDICATIONS TO STOP TAKING
I will STOP taking the medications listed below when I get home from the hospital:    Percocet 5/325 oral tablet  -- 1-2 tab(s) by mouth every 4 hours, As needed, Moderate Pain    oxyCODONE 10 mg oral tablet, extended release  -- 1 tab(s) by mouth every 12 hours

## 2019-02-01 NOTE — PROGRESS NOTE ADULT - PROBLEM SELECTOR PLAN 5
The patient had ventricular tachycardia and including bigeminy. Patient is a beta blocker and potassium and magnesium level is normal.. The patient had ventricular tachycardia and including bigeminy from previous admission.  ECHO unremarkable and seen by cardiology. Patient is a beta blocker and potassium and magnesium level is normal..

## 2019-02-01 NOTE — DISCHARGE NOTE ADULT - CARE PROVIDERS DIRECT ADDRESSES
[email or web address]

## 2019-02-01 NOTE — PROGRESS NOTE ADULT - SUBJECTIVE AND OBJECTIVE BOX
SUBJECTIVE: Patient seen and examined bedside by vascular surgery. Pt states he is feeling good. Pt denies flatus and denies bowel movements.  Denies n/v/f/sob.    losartan 25 milliGRAM(s) Oral daily  metoprolol succinate ER 25 milliGRAM(s) Oral daily      Vital Signs Last 24 Hrs  T(C): 37.2 (01 Feb 2019 14:05), Max: 37.2 (01 Feb 2019 14:05)  T(F): 98.9 (01 Feb 2019 14:05), Max: 98.9 (01 Feb 2019 14:05)  HR: 106 (01 Feb 2019 14:05) (82 - 109)  BP: 105/53 (01 Feb 2019 14:05) (97/55 - 135/70)  BP(mean): 79 (01 Feb 2019 10:02) (66 - 92)  RR: 16 (01 Feb 2019 14:05) (15 - 21)  SpO2: 94% (01 Feb 2019 14:05) (94% - 100%)  I&O's Detail    31 Jan 2019 07:01  -  01 Feb 2019 07:00  --------------------------------------------------------  IN:    IV PiggyBack: 100 mL    lactated ringers.: 1100 mL  Total IN: 1200 mL    OUT:    Accordian: 220 mL    Accordian: 290 mL    Bulb: 45 mL    Bulb: 2.5 mL    Indwelling Catheter - Urethral: 1085 mL  Total OUT: 1642.5 mL    Total NET: -442.5 mL      01 Feb 2019 07:01  -  01 Feb 2019 15:03  --------------------------------------------------------  IN:    lactated ringers.: 800 mL  Total IN: 800 mL    OUT:    Accordian: 150 mL    Accordian: 160 mL    Bulb: 30 mL    Bulb: 10 mL    Indwelling Catheter - Urethral: 500 mL  Total OUT: 850 mL    Total NET: -50 mL            Physical Exam  General: NAD, alert, interactive, appears comfortable  C/V: NSR  Pulm: Nonlabored breathing, no respiratory distress  Abd: softly distended, NT/ND.  Extrem:   Vasc: RLE palp fem, palp pop, palp PT, biphasic DP; LLE: palp fem, triphasic pop, biphasic PT, monophasic DP        LABS:                        10.0   8.9   )-----------( 195      ( 01 Feb 2019 07:41 )             31.3     02-01    143  |  110<H>  |  25<H>  ----------------------------<  131<H>  4.3   |  24  |  1.03    Ca    7.7<L>      01 Feb 2019 07:41      PTT - ( 31 Jan 2019 07:24 )  PTT:31.4 sec      RADIOLOGY & ADDITIONAL STUDIES: SUBJECTIVE: Patient seen and examined bedside by vascular surgery. Pt states he is feeling good. Pt denies flatus and denies bowel movements.  Denies n/v/f/sob.    losartan 25 milliGRAM(s) Oral daily  metoprolol succinate ER 25 milliGRAM(s) Oral daily      Vital Signs Last 24 Hrs  T(C): 37.2 (01 Feb 2019 14:05), Max: 37.2 (01 Feb 2019 14:05)  T(F): 98.9 (01 Feb 2019 14:05), Max: 98.9 (01 Feb 2019 14:05)  HR: 106 (01 Feb 2019 14:05) (82 - 109)  BP: 105/53 (01 Feb 2019 14:05) (97/55 - 135/70)  BP(mean): 79 (01 Feb 2019 10:02) (66 - 92)  RR: 16 (01 Feb 2019 14:05) (15 - 21)  SpO2: 94% (01 Feb 2019 14:05) (94% - 100%)  I&O's Detail    31 Jan 2019 07:01  -  01 Feb 2019 07:00  --------------------------------------------------------  IN:    IV PiggyBack: 100 mL    lactated ringers.: 1100 mL  Total IN: 1200 mL    OUT:    Accordian: 220 mL    Accordian: 290 mL    Bulb: 45 mL    Bulb: 2.5 mL    Indwelling Catheter - Urethral: 1085 mL  Total OUT: 1642.5 mL    Total NET: -442.5 mL      01 Feb 2019 07:01  -  01 Feb 2019 15:03  --------------------------------------------------------  IN:    lactated ringers.: 800 mL  Total IN: 800 mL    OUT:    Accordian: 150 mL    Accordian: 160 mL    Bulb: 30 mL    Bulb: 10 mL    Indwelling Catheter - Urethral: 500 mL  Total OUT: 850 mL    Total NET: -50 mL            Physical Exam  General: NAD, alert, interactive, appears comfortable  C/V: NSR  Pulm: Nonlabored breathing, no respiratory distress  Abd: softly distended, mild tenderness.  Extrem:   Vasc: RLE palp fem, palp pop, palp PT, biphasic DP; LLE: palp fem, triphasic pop, biphasic PT, monophasic DP        LABS:                        10.0   8.9   )-----------( 195      ( 01 Feb 2019 07:41 )             31.3     02-01    143  |  110<H>  |  25<H>  ----------------------------<  131<H>  4.3   |  24  |  1.03    Ca    7.7<L>      01 Feb 2019 07:41      PTT - ( 31 Jan 2019 07:24 )  PTT:31.4 sec      RADIOLOGY & ADDITIONAL STUDIES:

## 2019-02-01 NOTE — PATIENT PROFILE ADULT - SURGICAL SITE DRAINAGE DESCRIPTION
Left back superficial KARON, left back deep Hemovac, right back superficial KARON and right back deep Hemovac

## 2019-02-01 NOTE — PROGRESS NOTE ADULT - SUBJECTIVE AND OBJECTIVE BOX
Pt seen and examined.   No acute events.   Comfortable. PCA for pain.     Exam:  Back incision c/d/i. No infection or collection.  Drains dark blood.

## 2019-02-02 LAB
ANION GAP SERPL CALC-SCNC: 8 MMOL/L — SIGNIFICANT CHANGE UP (ref 5–17)
BASOPHILS NFR BLD AUTO: 0.1 % — SIGNIFICANT CHANGE UP (ref 0–2)
BUN SERPL-MCNC: 23 MG/DL — SIGNIFICANT CHANGE UP (ref 7–23)
CALCIUM SERPL-MCNC: 7.6 MG/DL — LOW (ref 8.4–10.5)
CHLORIDE SERPL-SCNC: 109 MMOL/L — HIGH (ref 96–108)
CO2 SERPL-SCNC: 23 MMOL/L — SIGNIFICANT CHANGE UP (ref 22–31)
CREAT SERPL-MCNC: 1.15 MG/DL — SIGNIFICANT CHANGE UP (ref 0.5–1.3)
EOSINOPHIL NFR BLD AUTO: 0.6 % — SIGNIFICANT CHANGE UP (ref 0–6)
GLUCOSE SERPL-MCNC: 139 MG/DL — HIGH (ref 70–99)
HBA1C BLD-MCNC: 5 % — SIGNIFICANT CHANGE UP (ref 4–5.6)
HCT VFR BLD CALC: 27.8 % — LOW (ref 39–50)
HGB BLD-MCNC: 9.1 G/DL — LOW (ref 13–17)
LYMPHOCYTES # BLD AUTO: 9.5 % — LOW (ref 13–44)
MCHC RBC-ENTMCNC: 30.2 PG — SIGNIFICANT CHANGE UP (ref 27–34)
MCHC RBC-ENTMCNC: 32.7 G/DL — SIGNIFICANT CHANGE UP (ref 32–36)
MCV RBC AUTO: 92.4 FL — SIGNIFICANT CHANGE UP (ref 80–100)
MONOCYTES NFR BLD AUTO: 9.5 % — SIGNIFICANT CHANGE UP (ref 2–14)
NEUTROPHILS NFR BLD AUTO: 80.3 % — HIGH (ref 43–77)
PLATELET # BLD AUTO: 181 K/UL — SIGNIFICANT CHANGE UP (ref 150–400)
POTASSIUM SERPL-MCNC: 4.5 MMOL/L — SIGNIFICANT CHANGE UP (ref 3.5–5.3)
POTASSIUM SERPL-SCNC: 4.5 MMOL/L — SIGNIFICANT CHANGE UP (ref 3.5–5.3)
RBC # BLD: 3.01 M/UL — LOW (ref 4.2–5.8)
RBC # FLD: 16.8 % — SIGNIFICANT CHANGE UP (ref 10.3–16.9)
SODIUM SERPL-SCNC: 140 MMOL/L — SIGNIFICANT CHANGE UP (ref 135–145)
WBC # BLD: 11.3 K/UL — HIGH (ref 3.8–10.5)
WBC # FLD AUTO: 11.3 K/UL — HIGH (ref 3.8–10.5)

## 2019-02-02 PROCEDURE — 99232 SBSQ HOSP IP/OBS MODERATE 35: CPT

## 2019-02-02 PROCEDURE — 71045 X-RAY EXAM CHEST 1 VIEW: CPT | Mod: 26

## 2019-02-02 RX ORDER — ACETAMINOPHEN 500 MG
1000 TABLET ORAL ONCE
Qty: 0 | Refills: 0 | Status: COMPLETED | OUTPATIENT
Start: 2019-02-02 | End: 2019-02-02

## 2019-02-02 RX ORDER — ACETAMINOPHEN 500 MG
650 TABLET ORAL ONCE
Qty: 0 | Refills: 0 | Status: COMPLETED | OUTPATIENT
Start: 2019-02-02 | End: 2019-02-02

## 2019-02-02 RX ADMIN — Medication 2: at 18:14

## 2019-02-02 RX ADMIN — Medication 2: at 12:29

## 2019-02-02 RX ADMIN — Medication 100 MILLIGRAM(S): at 12:28

## 2019-02-02 RX ADMIN — Medication 1000 MILLIGRAM(S): at 19:00

## 2019-02-02 RX ADMIN — Medication 650 MILLIGRAM(S): at 21:11

## 2019-02-02 RX ADMIN — Medication 650 MILLIGRAM(S): at 22:00

## 2019-02-02 RX ADMIN — ATORVASTATIN CALCIUM 80 MILLIGRAM(S): 80 TABLET, FILM COATED ORAL at 21:11

## 2019-02-02 RX ADMIN — Medication 400 MILLIGRAM(S): at 18:30

## 2019-02-02 RX ADMIN — Medication 100 MILLIGRAM(S): at 06:18

## 2019-02-02 RX ADMIN — Medication 100 MILLIGRAM(S): at 21:12

## 2019-02-02 RX ADMIN — SENNA PLUS 2 TABLET(S): 8.6 TABLET ORAL at 21:12

## 2019-02-02 NOTE — PROGRESS NOTE ADULT - ASSESSMENT
Pt doing well s/p back closure with paraspinal muscle flaps. POD#2.   1. Maintain JPs  2. HV drains per Spine service  3. Brace at bedside

## 2019-02-02 NOTE — PROGRESS NOTE ADULT - SUBJECTIVE AND OBJECTIVE BOX
Ortho Note    Pt comfortable without complaints, bolused 500cc for hypotension of 90s/50s. Responded appropriates, /60, pain controlled  Denies CP, SOB, N/V, numbness/tingling     Vital Signs Last 24 Hrs  T(C): 37.7 (02-02-19 @ 04:54), Max: 37.7 (02-02-19 @ 04:54)  T(F): 99.9 (02-02-19 @ 04:54), Max: 99.9 (02-02-19 @ 04:54)  HR: 106 (02-02-19 @ 09:10) (88 - 106)  BP: 125/60 (02-02-19 @ 06:38) (104/51 - 125/60)  BP(mean): --  RR: 16 (02-02-19 @ 09:10) (16 - 16)  SpO2: 96% (02-02-19 @ 09:10) (95% - 96%)    General: Pt Alert and oriented, NAD  Back DSG C/D/I  HVx2 and JPx2 intact holding good suction  Pulses: 2+ DP/PT  Sensation: s/s/sp/dp/t intact  Motor: EHL/FHL/TA/GS 5/5                          9.1    11.3  )-----------( 181      ( 02 Feb 2019 06:58 )             27.8   02 Feb 2019 06:58    140    |  109    |  23     ----------------------------<  139    4.5     |  23     |  1.15     Ca    7.6        02 Feb 2019 06:58  Drain output:    02-01-19 @ 07:01  -  02-02-19 @ 07:00  --------------------------------------------------------  OUT:    Accordian: 290 mL    Accordian: 255 mL    Bulb: 67.5 mL    Bulb: 25 mL  Total OUT: 637.5 mL  	    A/P: 73M s/p ALIF L4-S1 and PSF T10-pelvis  - Stable  - Pain Control  - DVT ppx: SCDs  - PT, WBS: WBAT, needs walker x 3 months  - xrays today  - dispo pending    Ortho Pager [phone]

## 2019-02-02 NOTE — PROGRESS NOTE ADULT - SUBJECTIVE AND OBJECTIVE BOX
DAILY PROGRESS NOTE:    S: tolerating CLD. No flatus or BMs    O:    Vital Signs Last 24 Hrs  T(C): 37.2 (02 Feb 2019 15:56), Max: 38.7 (01 Feb 2019 17:30)  T(F): 98.9 (02 Feb 2019 15:56), Max: 101.6 (01 Feb 2019 17:30)  HR: 112 (02 Feb 2019 15:56) (88 - 115)  BP: 106/52 (02 Feb 2019 15:56) (87/46 - 148/67)  BP(mean): --  RR: 17 (02 Feb 2019 15:56) (16 - 18)  SpO2: 99% (02 Feb 2019 15:56) (85% - 99%)    I&O's Detail    01 Feb 2019 07:01  -  02 Feb 2019 07:00  --------------------------------------------------------  IN:    lactated ringers.: 1200 mL  Total IN: 1200 mL    OUT:    Accordian: 290 mL    Accordian: 255 mL    Bulb: 67.5 mL    Bulb: 25 mL    Indwelling Catheter - Urethral: 1200 mL  Total OUT: 1837.5 mL    Total NET: -637.5 mL      02 Feb 2019 07:01  -  02 Feb 2019 17:01  --------------------------------------------------------  IN:    Oral Fluid: 300 mL  Total IN: 300 mL    OUT:    Accordian: 30 mL    Accordian: 50 mL    Bulb: 7.5 mL    Bulb: 2.5 mL    Indwelling Catheter - Urethral: 650 mL    Voided: 300 mL  Total OUT: 1040 mL    Total NET: -740 mL        Physical Exam  General: NAD, alert, interactive, appears comfortable  C/V: NSR  Pulm: Nonlabored breathing, no respiratory distress  Abd: softly distended, NT/ND.  Extrem:   Vasc: RLE palp fem, palp pop, palp PT, biphasic DP; LLE: palp fem, triphasic pop, biphasic PT, monophasic DP      LABS:                        9.1    11.3  )-----------( 181      ( 02 Feb 2019 06:58 )             27.8     02-02    140  |  109<H>  |  23  ----------------------------<  139<H>  4.5   |  23  |  1.15    Ca    7.6<L>      02 Feb 2019 06:58            RADIOLOGY & ADDITIONAL STUDIES:

## 2019-02-02 NOTE — PROGRESS NOTE ADULT - SUBJECTIVE AND OBJECTIVE BOX
Neurology Follow up note    Name  NANDO HERNANDEZ    HPI:  72yo male presents with lumbar spine pain x  several years. The patient states he had spinal surgery in June 2018 but that the "rods broke." He denies preceding accident/injury or trauma to the back. He complains of pain to his low back and denies radiation of pain to his legs. He states that his legs are "sometimes tired." The patient states he has been ambulating with a cane for several years. The patient tried conservative therapies which failed to alleviate his symptoms. Denies numbness/tingling down the lower extremities and any bowel/bladder incontinence. Denies hx of DVT. The patient feels well today and denies chest pain/shortness of breath/fever/chills.   Presents for elective anterior lumbar interbody fusion L4-Sl with interbody cages and neuromonitoring.  Revision posterior spinal fusion T10-pelvis with instrumentation and neuromonitoring. (30 Jan 2019 14:08)      Interval History - back pain - no new radicular symptoms in the LE        REVIEW OF SYSTEMS    Vital Signs Last 24 Hrs  T(C): 37.7 (02 Feb 2019 04:54), Max: 38.7 (01 Feb 2019 17:30)  T(F): 99.9 (02 Feb 2019 04:54), Max: 101.6 (01 Feb 2019 17:30)  HR: 115 (02 Feb 2019 09:42) (88 - 115)  BP: 129/58 (02 Feb 2019 09:42) (96/50 - 148/67)  BP(mean): --  RR: 16 (02 Feb 2019 09:42) (16 - 18)  SpO2: 95% (02 Feb 2019 09:42) (85% - 96%)    Physical Exam-     Mental Status- awake and follows commands    Cranial Nerves- full EOM    Gait and station- n/a    Motor- moves all 4 extremities    Reflexes- decreased    Sensation- no sensory level    Coordination- no tremors    Vascular - no bruits    Medications  allopurinol 100 milliGRAM(s) Oral daily  atorvastatin 80 milliGRAM(s) Oral at bedtime  benzocaine 15 mG/menthol 3.6 mG (Sugar-Free) Lozenge 1 Lozenge Oral three times a day PRN  BUpivacaine liposome 1.3% Injectable (no eMAR) 20 milliLiter(s) Local Injection Once  dextrose 40% Gel 15 Gram(s) Oral once PRN  dextrose 5%. 1000 milliLiter(s) IV Continuous <Continuous>  dextrose 50% Injectable 12.5 Gram(s) IV Push once  dextrose 50% Injectable 25 Gram(s) IV Push once  dextrose 50% Injectable 25 Gram(s) IV Push once  diazepam    Tablet 5 milliGRAM(s) Oral every 8 hours PRN  docusate sodium 100 milliGRAM(s) Oral three times a day  glucagon  Injectable 1 milliGRAM(s) IntraMuscular once PRN  HYDROmorphone PCA (1 mG/mL) 30 milliLiter(s) PCA Continuous Once  HYDROmorphone PCA (1 mG/mL) Rescue Clinician Bolus 1 milliGRAM(s) IV Push every 2 hours PRN  insulin lispro (HumaLOG) corrective regimen sliding scale   SubCutaneous three times a day before meals  lactated ringers. 1000 milliLiter(s) IV Continuous <Continuous>  losartan 25 milliGRAM(s) Oral daily  metoprolol succinate ER 25 milliGRAM(s) Oral daily  naloxone Injectable 0.1 milliGRAM(s) IV Push Once PRN  ondansetron Injectable 4 milliGRAM(s) IV Push every 6 hours PRN  senna 2 Tablet(s) Oral at bedtime      Lab      Radiology    Assessment- Lumbar radiculopathy    Plan no new deficits post op

## 2019-02-02 NOTE — PROGRESS NOTE ADULT - SUBJECTIVE AND OBJECTIVE BOX
Pt seen and examined. No acute events overnight.   Pain controlled.     Exam:  Back incision c/d/i. No collections.   No infection or separation.

## 2019-02-02 NOTE — PROGRESS NOTE ADULT - SUBJECTIVE AND OBJECTIVE BOX
Chief Complaint/Reason for Consult: periop cv mgmt  INTERVAL HPI: transient hypotension, resolving with IVF  	  MEDICATIONS:  losartan 25 milliGRAM(s) Oral daily  metoprolol succinate ER 25 milliGRAM(s) Oral daily        diazepam    Tablet 5 milliGRAM(s) Oral every 8 hours PRN  HYDROmorphone PCA (1 mG/mL) 30 milliLiter(s) PCA Continuous Once  HYDROmorphone PCA (1 mG/mL) Rescue Clinician Bolus 1 milliGRAM(s) IV Push every 2 hours PRN  ondansetron Injectable 4 milliGRAM(s) IV Push every 6 hours PRN    docusate sodium 100 milliGRAM(s) Oral three times a day  senna 2 Tablet(s) Oral at bedtime    allopurinol 100 milliGRAM(s) Oral daily  atorvastatin 80 milliGRAM(s) Oral at bedtime  dextrose 40% Gel 15 Gram(s) Oral once PRN  dextrose 50% Injectable 12.5 Gram(s) IV Push once  dextrose 50% Injectable 25 Gram(s) IV Push once  dextrose 50% Injectable 25 Gram(s) IV Push once  glucagon  Injectable 1 milliGRAM(s) IntraMuscular once PRN  insulin lispro (HumaLOG) corrective regimen sliding scale   SubCutaneous three times a day before meals    benzocaine 15 mG/menthol 3.6 mG (Sugar-Free) Lozenge 1 Lozenge Oral three times a day PRN  dextrose 5%. 1000 milliLiter(s) IV Continuous <Continuous>  lactated ringers. 1000 milliLiter(s) IV Continuous <Continuous>      REVIEW OF SYSTEMS:  [x] As per HPI  CONSTITUTIONAL: No fever, weight loss, or fatigue  RESPIRATORY: No cough, wheezing, chills or hemoptysis; No Shortness of Breath  CARDIOVASCULAR: No chest pain, palpitations, dizziness, or leg swelling  GASTROINTESTINAL: No abdominal or epigastric pain. No nausea, vomiting, or hematemesis; No diarrhea or constipation. No melena or hematochezia.  MUSCULOSKELETAL: No joint pain or swelling; No muscle, back, or extremity pain  [x] All others negative	  [ ] Unable to obtain    PHYSICAL EXAM:  T(C): 37.7 (02-02-19 @ 04:54), Max: 38.7 (02-01-19 @ 17:30)  HR: 115 (02-02-19 @ 09:42) (88 - 115)  BP: 129/58 (02-02-19 @ 09:42) (96/50 - 148/67)  RR: 16 (02-02-19 @ 09:42) (16 - 18)  SpO2: 95% (02-02-19 @ 09:42) (85% - 96%)  Wt(kg): --  I&O's Summary    01 Feb 2019 07:01  -  02 Feb 2019 07:00  --------------------------------------------------------  IN: 1200 mL / OUT: 1837.5 mL / NET: -637.5 mL    02 Feb 2019 07:01  -  02 Feb 2019 12:17  --------------------------------------------------------  IN: 300 mL / OUT: 650 mL / NET: -350 mL          Appearance: Normal	  HEENT:   Normal oral mucosa  Cardiovascular: Normal S1 S2, No JVD, No murmurs, No edema  Respiratory: Lungs clear to auscultation	  Gastrointestinal:  Soft, Non-tender, + BS	  Extremities: Normal range of motion, No clubbing, cyanosis or edema  Vascular: Peripheral pulses palpable 2+ bilaterally    TELEMETRY: 	    ECG:   	  RADIOLOGY:   CXR:  CT:  US:    CARDIAC TESTING:  Echocardiogram:  Catheterization:  Stress Test:      LABS:	 	    CARDIAC MARKERS:                                  9.1    11.3  )-----------( 181      ( 02 Feb 2019 06:58 )             27.8     02-02    140  |  109<H>  |  23  ----------------------------<  139<H>  4.5   |  23  |  1.15    Ca    7.6<L>      02 Feb 2019 06:58      proBNP:   Lipid Profile:   HgA1c: Hemoglobin A1C, Whole Blood: 5.0 % (02-02 @ 06:58)    TSH:     ASSESSMENT/PLAN: 	    # post op s complications  mild tachycardia - anxious, pain, dehydration  BUN 23 - can encourage PO versus give 1 L IVF, Cr stable   Echo preserved EG normal diasolic fxn    #CAD - ekg non ischemic  echo normal EF normal pressures, unchanged  no cp sob palp      #HTN - at goal, pain control   losartan 25 milliGRAM(s) Oral daily  metoprolol succinate ER 25 milliGRAM(s) Oral daily

## 2019-02-03 LAB
ANION GAP SERPL CALC-SCNC: 7 MMOL/L — SIGNIFICANT CHANGE UP (ref 5–17)
BASOPHILS NFR BLD AUTO: 0.1 % — SIGNIFICANT CHANGE UP (ref 0–2)
BUN SERPL-MCNC: 18 MG/DL — SIGNIFICANT CHANGE UP (ref 7–23)
CALCIUM SERPL-MCNC: 8 MG/DL — LOW (ref 8.4–10.5)
CHLORIDE SERPL-SCNC: 105 MMOL/L — SIGNIFICANT CHANGE UP (ref 96–108)
CO2 SERPL-SCNC: 26 MMOL/L — SIGNIFICANT CHANGE UP (ref 22–31)
CREAT SERPL-MCNC: 0.98 MG/DL — SIGNIFICANT CHANGE UP (ref 0.5–1.3)
EOSINOPHIL NFR BLD AUTO: 1.2 % — SIGNIFICANT CHANGE UP (ref 0–6)
GLUCOSE SERPL-MCNC: 132 MG/DL — HIGH (ref 70–99)
HCT VFR BLD CALC: 26.7 % — LOW (ref 39–50)
HGB BLD-MCNC: 8.4 G/DL — LOW (ref 13–17)
LYMPHOCYTES # BLD AUTO: 7.5 % — LOW (ref 13–44)
MCHC RBC-ENTMCNC: 29.1 PG — SIGNIFICANT CHANGE UP (ref 27–34)
MCHC RBC-ENTMCNC: 31.5 G/DL — LOW (ref 32–36)
MCV RBC AUTO: 92.4 FL — SIGNIFICANT CHANGE UP (ref 80–100)
MONOCYTES NFR BLD AUTO: 9.2 % — SIGNIFICANT CHANGE UP (ref 2–14)
NEUTROPHILS NFR BLD AUTO: 82 % — HIGH (ref 43–77)
PLATELET # BLD AUTO: 194 K/UL — SIGNIFICANT CHANGE UP (ref 150–400)
POTASSIUM SERPL-MCNC: 4.5 MMOL/L — SIGNIFICANT CHANGE UP (ref 3.5–5.3)
POTASSIUM SERPL-SCNC: 4.5 MMOL/L — SIGNIFICANT CHANGE UP (ref 3.5–5.3)
RBC # BLD: 2.89 M/UL — LOW (ref 4.2–5.8)
RBC # FLD: 16 % — SIGNIFICANT CHANGE UP (ref 10.3–16.9)
SODIUM SERPL-SCNC: 138 MMOL/L — SIGNIFICANT CHANGE UP (ref 135–145)
WBC # BLD: 11.2 K/UL — HIGH (ref 3.8–10.5)
WBC # FLD AUTO: 11.2 K/UL — HIGH (ref 3.8–10.5)

## 2019-02-03 PROCEDURE — 99232 SBSQ HOSP IP/OBS MODERATE 35: CPT

## 2019-02-03 PROCEDURE — 72082 X-RAY EXAM ENTIRE SPI 2/3 VW: CPT | Mod: 26

## 2019-02-03 RX ORDER — SODIUM CHLORIDE 9 MG/ML
1000 INJECTION, SOLUTION INTRAVENOUS
Qty: 0 | Refills: 0 | Status: DISCONTINUED | OUTPATIENT
Start: 2019-02-03 | End: 2019-02-05

## 2019-02-03 RX ADMIN — Medication 5 MILLIGRAM(S): at 21:54

## 2019-02-03 RX ADMIN — Medication 25 MILLIGRAM(S): at 06:24

## 2019-02-03 RX ADMIN — LOSARTAN POTASSIUM 25 MILLIGRAM(S): 100 TABLET, FILM COATED ORAL at 06:24

## 2019-02-03 RX ADMIN — Medication 100 MILLIGRAM(S): at 14:44

## 2019-02-03 RX ADMIN — SODIUM CHLORIDE 100 MILLILITER(S): 9 INJECTION, SOLUTION INTRAVENOUS at 05:20

## 2019-02-03 RX ADMIN — ATORVASTATIN CALCIUM 80 MILLIGRAM(S): 80 TABLET, FILM COATED ORAL at 21:53

## 2019-02-03 NOTE — PROGRESS NOTE ADULT - SUBJECTIVE AND OBJECTIVE BOX
Ortho Note    Pt remained tachy overnight, CXR done was negative, given fluids, tachy improving, pain controlled  Denies CP, SOB, N/V, numbness/tingling     Vital Signs Last 24 Hrs  T(C): 36.9 (02-03-19 @ 05:17), Max: 36.9 (02-03-19 @ 05:17)  T(F): 98.5 (02-03-19 @ 05:17), Max: 98.5 (02-03-19 @ 05:17)  HR: 100 (02-03-19 @ 06:35) (100 - 105)  BP: 128/58 (02-03-19 @ 05:17) (128/58 - 128/58)  BP(mean): --  RR: 18 (02-03-19 @ 06:35) (18 - 18)  SpO2: 95% (02-03-19 @ 06:35) (95% - 97%)    General: Pt Alert and oriented, NAD  Back DSG C/D/I  HVx2 and JPx2 intact holding good suction  Pulses: 2+ DP/PT  Sensation: s/s/sp/dp/t intact  Motor: EHL/FHL/TA/GS 5/5                          9.1    11.3  )-----------( 181      ( 02 Feb 2019 06:58 )             27.8   02 Feb 2019 06:58    140    |  109    |  23     ----------------------------<  139    4.5     |  23     |  1.15     Drain output:    02-02-19 @ 07:01  -  02-03-19 @ 07:00  --------------------------------------------------------  OUT:    Accordian: 110 mL    Accordian: 95 mL    Bulb: 37.5 mL    Bulb: 12.5 mL  Total OUT: 255 mL      A/P: 73M s/p ALIF L4-S1 and PSF T10-pelvis  - Stable  - Pain Control  - DVT ppx: SCDs  - PT, WBS: WBAT, needs walker x 3 months  - xrays today and full length tomorrow   - dispo pending    Ortho Pager [phone]

## 2019-02-03 NOTE — PROGRESS NOTE ADULT - SUBJECTIVE AND OBJECTIVE BOX
Chief Complaint/Reason for Consult: periop CV Eval  INTERVAL HPI: walking w PT with classic othrostatic response, now improving with IVF  	  MEDICATIONS:  losartan 25 milliGRAM(s) Oral daily  metoprolol succinate ER 25 milliGRAM(s) Oral daily        diazepam    Tablet 5 milliGRAM(s) Oral every 8 hours PRN  HYDROmorphone PCA (1 mG/mL) Rescue Clinician Bolus 1 milliGRAM(s) IV Push every 2 hours PRN  ondansetron Injectable 4 milliGRAM(s) IV Push every 6 hours PRN    docusate sodium 100 milliGRAM(s) Oral three times a day  senna 2 Tablet(s) Oral at bedtime    allopurinol 100 milliGRAM(s) Oral daily  atorvastatin 80 milliGRAM(s) Oral at bedtime  dextrose 40% Gel 15 Gram(s) Oral once PRN  dextrose 50% Injectable 12.5 Gram(s) IV Push once  dextrose 50% Injectable 25 Gram(s) IV Push once  dextrose 50% Injectable 25 Gram(s) IV Push once  glucagon  Injectable 1 milliGRAM(s) IntraMuscular once PRN  insulin lispro (HumaLOG) corrective regimen sliding scale   SubCutaneous Before meals and at bedtime    benzocaine 15 mG/menthol 3.6 mG (Sugar-Free) Lozenge 1 Lozenge Oral three times a day PRN  dextrose 5%. 1000 milliLiter(s) IV Continuous <Continuous>      REVIEW OF SYSTEMS:  [x] As per HPI  CONSTITUTIONAL: No fever, weight loss, or fatigue  RESPIRATORY: No cough, wheezing, chills or hemoptysis; No Shortness of Breath  CARDIOVASCULAR: No chest pain, palpitations, dizziness, or leg swelling  GASTROINTESTINAL: No abdominal or epigastric pain. No nausea, vomiting, or hematemesis; No diarrhea or constipation. No melena or hematochezia.  MUSCULOSKELETAL: No joint pain or swelling; No muscle, back, or extremity pain  [x] All others negative	  [ ] Unable to obtain    PHYSICAL EXAM:  T(C): 36.7 (02-03-19 @ 09:10), Max: 38.7 (02-02-19 @ 20:39)  HR: 106 (02-03-19 @ 09:10) (96 - 115)  BP: 116/58 (02-03-19 @ 09:10) (87/46 - 137/60)  RR: 19 (02-03-19 @ 09:10) (16 - 24)  SpO2: 96% (02-03-19 @ 09:10) (95% - 99%)  Wt(kg): --  I&O's Summary    02 Feb 2019 07:01  -  03 Feb 2019 07:00  --------------------------------------------------------  IN: 1500 mL / OUT: 1355 mL / NET: 145 mL    03 Feb 2019 07:01  -  03 Feb 2019 12:21  --------------------------------------------------------  IN: 0 mL / OUT: 100 mL / NET: -100 mL          Appearance: Normal	  HEENT:   Normal oral mucosa  Cardiovascular: Normal S1 S2, No JVD, No murmurs, No edema  Respiratory: Lungs clear to auscultation	  Gastrointestinal:  Soft, Non-tender, + BS	  Extremities: Normal range of motion, No clubbing, cyanosis or edema  Vascular: Peripheral pulses palpable 2+ bilaterally    TELEMETRY: 	    ECG:   	  RADIOLOGY:   CXR:  CT:  US:    CARDIAC TESTING:  Echocardiogram:  Catheterization:  Stress Test:      LABS:	 	    CARDIAC MARKERS:                                  8.4    11.2  )-----------( 194      ( 03 Feb 2019 07:27 )             26.7     02-03    138  |  105  |  18  ----------------------------<  132<H>  4.5   |  26  |  0.98    Ca    8.0<L>      03 Feb 2019 07:27      proBNP:   Lipid Profile:   HgA1c:   TSH:     ASSESSMENT/PLAN: 	    # IV hydration - check orthostatic, encourage PO  can dc IVF when orthostatics negative  Echo preserved EG normal diasolic fxn    #CAD - ekg non ischemic  echo normal EF normal pressures, unchanged  no cp sob palp      #HTN - at goal, pain control   losartan 25 milliGRAM(s) Oral daily  metoprolol succinate ER 25 milliGRAM(s) Oral daily

## 2019-02-03 NOTE — PROGRESS NOTE ADULT - SUBJECTIVE AND OBJECTIVE BOX
Subjective  Patient seen at bedside. Tolerating CLD. Multiple bowel movements today. No nausea or vomiting.      PMH:  PAD (peripheral artery disease)  Hypercholesterolemia  Hypertension      Medication:   losartan 25  metoprolol succinate ER 25        Vital Signs Last 24 Hrs  T(C): 36.1 (03 Feb 2019 16:50), Max: 38.7 (02 Feb 2019 20:39)  T(F): 97 (03 Feb 2019 16:50), Max: 101.6 (02 Feb 2019 20:39)  HR: 100 (03 Feb 2019 16:50) (91 - 115)  BP: 114/57 (03 Feb 2019 16:50) (114/53 - 137/73)  BP(mean): --  RR: 17 (03 Feb 2019 16:50) (15 - 24)  SpO2: 95% (03 Feb 2019 16:50) (94% - 99%)  I&O's Summary    02 Feb 2019 07:01  -  03 Feb 2019 07:00  --------------------------------------------------------  IN: 1500 mL / OUT: 1355 mL / NET: 145 mL    03 Feb 2019 07:01  -  03 Feb 2019 17:58  --------------------------------------------------------  IN: 0 mL / OUT: 100 mL / NET: -100 mL          Physical Exam  General: NAD, alert, interactive, appears comfortable  C/V: NSR  Pulm: Nonlabored breathing, no respiratory distress  Abd: softly distended, NT/ND.  Extrem:   Vasc: RLE palp fem, palp pop, palp PT, biphasic DP; LLE: palp fem, triphasic pop, biphasic PT, monophasic DP        LABS:                        8.4    11.2  )-----------( 194      ( 03 Feb 2019 07:27 )             26.7     02-03    138  |  105  |  18  ----------------------------<  132<H>  4.5   |  26  |  0.98    Ca    8.0<L>      03 Feb 2019 07:27          Radiology and Additional Studies:

## 2019-02-03 NOTE — PROVIDER CONTACT NOTE (OTHER) - ACTION/TREATMENT ORDERED:
Provider notified
Provider notified. CBC drawn and N.S Bolus 500 ml given as ordered. Will continue to monitor.
Provider notified. He came to see pt. Tylenol 650 mg PO and chest X-ray ordered by MD. Incentive spirometer and PO fluids encouraged. Pt kept on IV fluids as ordered. Will continue to monitor.

## 2019-02-03 NOTE — PROGRESS NOTE ADULT - SUBJECTIVE AND OBJECTIVE BOX
Neurology Follow up note    Name  NANDO HERNANDEZ    HPI:  72yo male presents with lumbar spine pain x  several years. The patient states he had spinal surgery in June 2018 but that the "rods broke." He denies preceding accident/injury or trauma to the back. He complains of pain to his low back and denies radiation of pain to his legs. He states that his legs are "sometimes tired." The patient states he has been ambulating with a cane for several years. The patient tried conservative therapies which failed to alleviate his symptoms. Denies numbness/tingling down the lower extremities and any bowel/bladder incontinence. Denies hx of DVT. The patient feels well today and denies chest pain/shortness of breath/fever/chills.   Presents for elective anterior lumbar interbody fusion L4-Sl with interbody cages and neuromonitoring.  Revision posterior spinal fusion T10-pelvis with instrumentation and neuromonitoring. (30 Jan 2019 14:08)      Interval History - back pain improved - weakness in his hands        REVIEW OF SYSTEMS    Vital Signs Last 24 Hrs  T(C): 36.4 (03 Feb 2019 14:11), Max: 38.7 (02 Feb 2019 20:39)  T(F): 97.5 (03 Feb 2019 14:11), Max: 101.6 (02 Feb 2019 20:39)  HR: 93 (03 Feb 2019 14:11) (91 - 115)  BP: 114/53 (03 Feb 2019 14:11) (87/46 - 137/73)  BP(mean): --  RR: 15 (03 Feb 2019 14:11) (15 - 24)  SpO2: 94% (03 Feb 2019 14:11) (94% - 99%)    Physical Exam-     Mental Status- awake     Cranial Nerves- full EOM    Gait and station- n/a    Motor- UE hand intrinsic weakness    Reflexes- decrease LE    Sensation- no sensory level    Coordination- no tremors    Vascular - no bruits    Medications  allopurinol 100 milliGRAM(s) Oral daily  atorvastatin 80 milliGRAM(s) Oral at bedtime  benzocaine 15 mG/menthol 3.6 mG (Sugar-Free) Lozenge 1 Lozenge Oral three times a day PRN  BUpivacaine liposome 1.3% Injectable (no eMAR) 20 milliLiter(s) Local Injection Once  dextrose 40% Gel 15 Gram(s) Oral once PRN  dextrose 5%. 1000 milliLiter(s) IV Continuous <Continuous>  dextrose 50% Injectable 12.5 Gram(s) IV Push once  dextrose 50% Injectable 25 Gram(s) IV Push once  dextrose 50% Injectable 25 Gram(s) IV Push once  diazepam    Tablet 5 milliGRAM(s) Oral every 8 hours PRN  docusate sodium 100 milliGRAM(s) Oral three times a day  glucagon  Injectable 1 milliGRAM(s) IntraMuscular once PRN  HYDROmorphone PCA (1 mG/mL) Rescue Clinician Bolus 1 milliGRAM(s) IV Push every 2 hours PRN  insulin lispro (HumaLOG) corrective regimen sliding scale   SubCutaneous Before meals and at bedtime  losartan 25 milliGRAM(s) Oral daily  metoprolol succinate ER 25 milliGRAM(s) Oral daily  naloxone Injectable 0.1 milliGRAM(s) IV Push Once PRN  ondansetron Injectable 4 milliGRAM(s) IV Push every 6 hours PRN  senna 2 Tablet(s) Oral at bedtime      Lab      Radiology    Assessment- Lumbar radiculopathy    Plan If UE symptoms persist - the MRI cervical spine

## 2019-02-03 NOTE — PROGRESS NOTE ADULT - ASSESSMENT
73 M s/p ALIF L5-S1, revision PSF T10-pelvis    - remains tachycardic; cardiology following  - doing well, multiple BMs today, tolerating clears  - pulse exam unchanged    Plan  - advance to diabetic diet / dash diet  - vascular will cont. to follow  -Discussed with chief

## 2019-02-04 DIAGNOSIS — D50.0 IRON DEFICIENCY ANEMIA SECONDARY TO BLOOD LOSS (CHRONIC): ICD-10-CM

## 2019-02-04 LAB
ANION GAP SERPL CALC-SCNC: 8 MMOL/L — SIGNIFICANT CHANGE UP (ref 5–17)
BUN SERPL-MCNC: 18 MG/DL — SIGNIFICANT CHANGE UP (ref 7–23)
CALCIUM SERPL-MCNC: 8.1 MG/DL — LOW (ref 8.4–10.5)
CHLORIDE SERPL-SCNC: 104 MMOL/L — SIGNIFICANT CHANGE UP (ref 96–108)
CO2 SERPL-SCNC: 26 MMOL/L — SIGNIFICANT CHANGE UP (ref 22–31)
CREAT SERPL-MCNC: 0.91 MG/DL — SIGNIFICANT CHANGE UP (ref 0.5–1.3)
GLUCOSE SERPL-MCNC: 120 MG/DL — HIGH (ref 70–99)
HCT VFR BLD CALC: 24.7 % — LOW (ref 39–50)
HGB BLD-MCNC: 8 G/DL — LOW (ref 13–17)
MCHC RBC-ENTMCNC: 30.4 PG — SIGNIFICANT CHANGE UP (ref 27–34)
MCHC RBC-ENTMCNC: 32.4 G/DL — SIGNIFICANT CHANGE UP (ref 32–36)
MCV RBC AUTO: 93.9 FL — SIGNIFICANT CHANGE UP (ref 80–100)
PLATELET # BLD AUTO: 204 K/UL — SIGNIFICANT CHANGE UP (ref 150–400)
POTASSIUM SERPL-MCNC: 3.6 MMOL/L — SIGNIFICANT CHANGE UP (ref 3.5–5.3)
POTASSIUM SERPL-SCNC: 3.6 MMOL/L — SIGNIFICANT CHANGE UP (ref 3.5–5.3)
RBC # BLD: 2.63 M/UL — LOW (ref 4.2–5.8)
RBC # FLD: 15.3 % — SIGNIFICANT CHANGE UP (ref 10.3–16.9)
SODIUM SERPL-SCNC: 138 MMOL/L — SIGNIFICANT CHANGE UP (ref 135–145)
WBC # BLD: 9.2 K/UL — SIGNIFICANT CHANGE UP (ref 3.8–10.5)
WBC # FLD AUTO: 9.2 K/UL — SIGNIFICANT CHANGE UP (ref 3.8–10.5)

## 2019-02-04 PROCEDURE — 99232 SBSQ HOSP IP/OBS MODERATE 35: CPT | Mod: GC

## 2019-02-04 RX ORDER — OXYCODONE HYDROCHLORIDE 5 MG/1
10 TABLET ORAL EVERY 4 HOURS
Qty: 0 | Refills: 0 | Status: DISCONTINUED | OUTPATIENT
Start: 2019-02-04 | End: 2019-02-06

## 2019-02-04 RX ORDER — OXYCODONE HYDROCHLORIDE 5 MG/1
5 TABLET ORAL EVERY 4 HOURS
Qty: 0 | Refills: 0 | Status: DISCONTINUED | OUTPATIENT
Start: 2019-02-04 | End: 2019-02-06

## 2019-02-04 RX ORDER — HYDROMORPHONE HYDROCHLORIDE 2 MG/ML
0.5 INJECTION INTRAMUSCULAR; INTRAVENOUS; SUBCUTANEOUS
Qty: 0 | Refills: 0 | Status: DISCONTINUED | OUTPATIENT
Start: 2019-02-04 | End: 2019-02-06

## 2019-02-04 RX ORDER — CYCLOBENZAPRINE HYDROCHLORIDE 10 MG/1
5 TABLET, FILM COATED ORAL THREE TIMES A DAY
Qty: 0 | Refills: 0 | Status: DISCONTINUED | OUTPATIENT
Start: 2019-02-04 | End: 2019-02-06

## 2019-02-04 RX ADMIN — OXYCODONE HYDROCHLORIDE 5 MILLIGRAM(S): 5 TABLET ORAL at 13:09

## 2019-02-04 RX ADMIN — OXYCODONE HYDROCHLORIDE 5 MILLIGRAM(S): 5 TABLET ORAL at 13:42

## 2019-02-04 RX ADMIN — OXYCODONE HYDROCHLORIDE 5 MILLIGRAM(S): 5 TABLET ORAL at 17:36

## 2019-02-04 RX ADMIN — Medication 5 MILLIGRAM(S): at 05:34

## 2019-02-04 RX ADMIN — ATORVASTATIN CALCIUM 80 MILLIGRAM(S): 80 TABLET, FILM COATED ORAL at 21:17

## 2019-02-04 RX ADMIN — LOSARTAN POTASSIUM 25 MILLIGRAM(S): 100 TABLET, FILM COATED ORAL at 05:33

## 2019-02-04 RX ADMIN — Medication 25 MILLIGRAM(S): at 05:34

## 2019-02-04 RX ADMIN — OXYCODONE HYDROCHLORIDE 5 MILLIGRAM(S): 5 TABLET ORAL at 18:39

## 2019-02-04 RX ADMIN — Medication 100 MILLIGRAM(S): at 13:01

## 2019-02-04 NOTE — PROGRESS NOTE ADULT - SUBJECTIVE AND OBJECTIVE BOX
Neurology Follow up note    Name  NANDO HERNANDEZ    HPI:  74yo male presents with lumbar spine pain x  several years. The patient states he had spinal surgery in June 2018 but that the "rods broke." He denies preceding accident/injury or trauma to the back. He complains of pain to his low back and denies radiation of pain to his legs. He states that his legs are "sometimes tired." The patient states he has been ambulating with a cane for several years. The patient tried conservative therapies which failed to alleviate his symptoms. Denies numbness/tingling down the lower extremities and any bowel/bladder incontinence. Denies hx of DVT. The patient feels well today and denies chest pain/shortness of breath/fever/chills.   Presents for elective anterior lumbar interbody fusion L4-Sl with interbody cages and neuromonitoring.  Revision posterior spinal fusion T10-pelvis with instrumentation and neuromonitoring. (30 Jan 2019 14:08)      Interval History - back pain improved - UE still swollen and difficulty making a fist        REVIEW OF SYSTEMS    Vital Signs Last 24 Hrs  T(C): 36.9 (04 Feb 2019 04:20), Max: 37.6 (03 Feb 2019 20:50)  T(F): 98.5 (04 Feb 2019 04:20), Max: 99.6 (03 Feb 2019 20:50)  HR: 97 (04 Feb 2019 04:20) (91 - 106)  BP: 134/63 (04 Feb 2019 04:20) (114/53 - 137/73)  BP(mean): --  RR: 18 (04 Feb 2019 04:20) (15 - 19)  SpO2: 98% (04 Feb 2019 04:20) (94% - 98%)    Physical Exam-     Mental Status- awake and alert    Cranial Nerves- full EOM    Gait and station- n/a    Motor- UE slight diffuse weakness    Reflexes- decreased     Sensation- no sensory level    Coordination- no tremors    Vascular - no bruits    Medications  allopurinol 100 milliGRAM(s) Oral daily  atorvastatin 80 milliGRAM(s) Oral at bedtime  benzocaine 15 mG/menthol 3.6 mG (Sugar-Free) Lozenge 1 Lozenge Oral three times a day PRN  BUpivacaine liposome 1.3% Injectable (no eMAR) 20 milliLiter(s) Local Injection Once  dextrose 40% Gel 15 Gram(s) Oral once PRN  dextrose 5%. 1000 milliLiter(s) IV Continuous <Continuous>  dextrose 50% Injectable 12.5 Gram(s) IV Push once  dextrose 50% Injectable 25 Gram(s) IV Push once  dextrose 50% Injectable 25 Gram(s) IV Push once  diazepam    Tablet 5 milliGRAM(s) Oral every 8 hours PRN  docusate sodium 100 milliGRAM(s) Oral three times a day  glucagon  Injectable 1 milliGRAM(s) IntraMuscular once PRN  insulin lispro (HumaLOG) corrective regimen sliding scale   SubCutaneous Before meals and at bedtime  losartan 25 milliGRAM(s) Oral daily  metoprolol succinate ER 25 milliGRAM(s) Oral daily  naloxone Injectable 0.1 milliGRAM(s) IV Push Once PRN  ondansetron Injectable 4 milliGRAM(s) IV Push every 6 hours PRN  senna 2 Tablet(s) Oral at bedtime      Lab      Radiology    Assessment- Lumbar radiculopathy- doing well   With UE Sx- obtain MRI cervical spine    Plan

## 2019-02-04 NOTE — PROGRESS NOTE ADULT - PROBLEM SELECTOR PLAN 1
Patient is stable and has no claudication. Is able to ambulate in the room.
Patient is stable and has no claudication. Is able to ambulate in the room.

## 2019-02-04 NOTE — PROGRESS NOTE ADULT - SUBJECTIVE AND OBJECTIVE BOX
Interval Events: Reviewed  Patient seen and examined at bedside.    Patient is a 73y old  Male who presents with a chief complaint of Lower back pain (04 Feb 2019 13:56)  he is doing well.  he is walking with PT.  Pain is controlled    PAST MEDICAL & SURGICAL HISTORY:  PAD (peripheral artery disease)  Hypercholesterolemia  Hypertension  History of lumbosacral spine surgery  H/O laminectomy  History of hernia repair  S/P hip replacement: right hip  S/P knee replacement, bilateral      MEDICATIONS:  Pulmonary:    Antimicrobials:    Anticoagulants:    Cardiac:  losartan 25 milliGRAM(s) Oral daily  metoprolol succinate ER 25 milliGRAM(s) Oral daily      Allergies    No Known Allergies    Intolerances        Vital Signs Last 24 Hrs  T(C): 37.2 (04 Feb 2019 20:49), Max: 37.2 (04 Feb 2019 20:49)  T(F): 99 (04 Feb 2019 20:49), Max: 99 (04 Feb 2019 20:49)  HR: 84 (04 Feb 2019 20:49) (84 - 101)  BP: 125/59 (04 Feb 2019 20:49) (114/57 - 163/67)  BP(mean): 86 (04 Feb 2019 14:51) (86 - 86)  RR: 17 (04 Feb 2019 20:49) (16 - 18)  SpO2: 97% (04 Feb 2019 20:49) (96% - 100%)    02-03 @ 07:01 - 02-04 @ 07:00  --------------------------------------------------------  IN: 1800 mL / OUT: 1365 mL / NET: 435 mL    02-04 @ 07:01  -  02-04 @ 21:20  --------------------------------------------------------  IN: 0 mL / OUT: 270 mL / NET: -270 mL          LABS:      CBC Full  -  ( 04 Feb 2019 06:15 )  WBC Count : 9.2 K/uL  Hemoglobin : 8.0 g/dL  Hematocrit : 24.7 %  Platelet Count - Automated : 204 K/uL  Mean Cell Volume : 93.9 fL  Mean Cell Hemoglobin : 30.4 pg  Mean Cell Hemoglobin Concentration : 32.4 g/dL  Auto Neutrophil # : x  Auto Lymphocyte # : x  Auto Monocyte # : x  Auto Eosinophil # : x  Auto Basophil # : x  Auto Neutrophil % : x  Auto Lymphocyte % : x  Auto Monocyte % : x  Auto Eosinophil % : x  Auto Basophil % : x    02-04    138  |  104  |  18  ----------------------------<  120<H>  3.6   |  26  |  0.91    Ca    8.1<L>      04 Feb 2019 06:15                          RADIOLOGY & ADDITIONAL STUDIES (The following images were personally reviewed):  Ivory:                                     No  Urine output:                       adequate  DVT prophylaxis:                 Yes  Flattus:                                  Yes  Bowel movement:            yes

## 2019-02-04 NOTE — PROGRESS NOTE ADULT - SUBJECTIVE AND OBJECTIVE BOX
24 hr events    Subjective: Patient seen at bedside. Pt states he was able to tolerate solid foods last night. Multiple bowel movements yesterday. No nausea or vomiting.      PMH:  PAD (peripheral artery disease)  Hypercholesterolemia  Hypertension      Medication:   losartan 25  metoprolol succinate ER 25    Pain:   Nausea: [ ] YES [- ] NO            Vomiting: [ ] YES [ -] NO  Abd Pain: [ ] YES [- ] NO  Diarrhea: [ ] YES [- ] NO         Constipation: [ ] YES [- ] NO     Chest Pain: [ ] YES [- ] NO    SOB:  [ ] YES [- ] NO  Flatus: [+ ] YES [ ] NO  BM: [+ ] YES [ ] NO  Voiding: [ +] YES [ ] NO  Weakness: [ ] YES [ -] NO  Numbness: [ ] YES [- ] NO  Extremity coldness: [ ] YES [- ] NO  Claudication: [ ] YES [ -] NO  Extremity Swelling: [ ] YES [- ] NO  Ulcers: [ ] YES [- ] NO        Vital Signs Last 24 Hrs  T(C): 36.9 (04 Feb 2019 04:20), Max: 37.6 (03 Feb 2019 20:50)  T(F): 98.5 (04 Feb 2019 04:20), Max: 99.6 (03 Feb 2019 20:50)  HR: 97 (04 Feb 2019 04:20) (91 - 106)  BP: 134/63 (04 Feb 2019 04:20) (114/53 - 137/73)  BP(mean): --  RR: 18 (04 Feb 2019 04:20) (15 - 19)  SpO2: 98% (04 Feb 2019 04:20) (94% - 98%)    I&O's Summary    03 Feb 2019 07:01  -  04 Feb 2019 07:00  --------------------------------------------------------  IN: 1800 mL / OUT: 1365 mL / NET: 435 mL      Physical Exam  General: NAD, alert, interactive, appears comfortable  C/V: NSR  Pulm: Nonlabored breathing, no respiratory distress  Abd: softly distended, NT/ND.  Extrem:   Vasc: RLE palp fem, palp pop, palp PT, biphasic DP; LLE: palp fem, triphasic pop, biphasic PT, monophasic DP        Lines/drains/tubes:    LABS:                        8.0    9.2   )-----------( 204      ( 04 Feb 2019 06:15 )             24.7     02-04    138  |  104  |  18  ----------------------------<  120<H>  3.6   |  26  |  0.91    Ca    8.1<L>      04 Feb 2019 06:15            CAPILLARY BLOOD GLUCOSE      POCT Blood Glucose.: 121 mg/dL (04 Feb 2019 06:56)  POCT Blood Glucose.: 150 mg/dL (03 Feb 2019 22:10)  POCT Blood Glucose.: 104 mg/dL (03 Feb 2019 18:11)  POCT Blood Glucose.: 122 mg/dL (03 Feb 2019 11:54)      RADIOLOGY & ADDITIONAL TESTS:

## 2019-02-04 NOTE — PROGRESS NOTE ADULT - SUBJECTIVE AND OBJECTIVE BOX
Orthopaedic Surgery Progress Note    Patient seen and examined. SILVINO. Patient endorses weakness of bilateral hands since post op day 0 and pain in the toes of both feet which he states is due to his stretcher/bed post operatively. Pain controlled. Denies CP, SOB, N/V, tactile fevers, calf pain. MRI Cervical spine non contrast is pending for eval of upper extremity weakness. Pt is eval'd for Acute Rehab per PT.      Vital Signs Last 24 Hrs  T(C): 36.6 (04 Feb 2019 09:29), Max: 37.6 (03 Feb 2019 20:50)  T(F): 97.9 (04 Feb 2019 09:29), Max: 99.6 (03 Feb 2019 20:50)  HR: 94 (04 Feb 2019 09:29) (91 - 103)  BP: 132/64 (04 Feb 2019 09:29) (114/53 - 137/73)  BP(mean): --  RR: 18 (04 Feb 2019 09:29) (15 - 19)  SpO2: 97% (04 Feb 2019 09:29) (94% - 98%)         CAPILLARY BLOOD GLUCOSE      POCT Blood Glucose.: 136 mg/dL (04 Feb 2019 11:54)  POCT Blood Glucose.: 121 mg/dL (04 Feb 2019 06:56)  POCT Blood Glucose.: 150 mg/dL (03 Feb 2019 22:10)  POCT Blood Glucose.: 104 mg/dL (03 Feb 2019 18:11)                  Physical Exam:  Pt laying comfortably in bed, NAD.  Skin warm and well perfused, no visible erythema/ecchymoses.  Dressing C/D/I; HV x 4  EHL/FHL/TA/GS 5/5 motor strength bilateral lower extremities   SLT in tact and equal to distal bilateral lower extremities   DP/PT pulses 2+   strength decreased bilateral lower extremities   AIN/PIN in tact however limited 2/2 weakness/pain  SLT in tact and equal to distal bilateral upper extremities; brisk capillary refill distally     LABS                        8.0    9.2   )-----------( 204      ( 04 Feb 2019 06:15 )             24.7                                02-04    138  |  104  |  18  ----------------------------<  120<H>  3.6   |  26  |  0.91    Ca    8.1<L>      04 Feb 2019 06:15        A/P: POD #4 s/p ALIF L5-S1, posterior fusion revision T10-Pelvis    CONTINUE:        1. PT/OT: WBAT   2. DVT prophylaxis - SCDs  3. Pain Control as needed  - PM recs appreciated   4. Dispo: Acute rehab  5. Drain management x 4   6. F/U MRI Cervical spine Orthopaedic Surgery Progress Note    Patient seen and examined. SILVINO. Patient endorses weakness of bilateral hands since post op day 0 and pain in the toes of both feet which he states is due to his stretcher/bed post operatively. Pt endorses having had bowel movements. Pain controlled. Denies CP, SOB, N/V, tactile fevers, calf pain. MRI Cervical spine non contrast is pending for eval of upper extremity weakness. Pt is eval'd for Acute Rehab per PT.      Vital Signs Last 24 Hrs  T(C): 36.6 (04 Feb 2019 09:29), Max: 37.6 (03 Feb 2019 20:50)  T(F): 97.9 (04 Feb 2019 09:29), Max: 99.6 (03 Feb 2019 20:50)  HR: 94 (04 Feb 2019 09:29) (91 - 103)  BP: 132/64 (04 Feb 2019 09:29) (114/53 - 137/73)  BP(mean): --  RR: 18 (04 Feb 2019 09:29) (15 - 19)  SpO2: 97% (04 Feb 2019 09:29) (94% - 98%)         CAPILLARY BLOOD GLUCOSE      POCT Blood Glucose.: 136 mg/dL (04 Feb 2019 11:54)  POCT Blood Glucose.: 121 mg/dL (04 Feb 2019 06:56)  POCT Blood Glucose.: 150 mg/dL (03 Feb 2019 22:10)  POCT Blood Glucose.: 104 mg/dL (03 Feb 2019 18:11)                  Physical Exam:  Pt laying comfortably in bed, NAD.  Skin warm and well perfused, no visible erythema/ecchymoses.  Dressing C/D/I; HV x 4  Abdomen tender but soft   EHL/FHL/TA/GS 5/5 motor strength bilateral lower extremities   SLT in tact and equal to distal bilateral lower extremities   DP/PT pulses 2+   strength decreased bilateral lower extremities   AIN/PIN in tact however limited 2/2 weakness/pain  SLT in tact and equal to distal bilateral upper extremities; brisk capillary refill distally     LABS                        8.0    9.2   )-----------( 204      ( 04 Feb 2019 06:15 )             24.7                                02-04    138  |  104  |  18  ----------------------------<  120<H>  3.6   |  26  |  0.91    Ca    8.1<L>      04 Feb 2019 06:15        A/P: POD #4 s/p ALIF L5-S1, posterior fusion revision T10-Pelvis    CONTINUE:        1. PT/OT: WBAT   2. DVT prophylaxis - SCDs  3. Pain Control as needed  - PM recs appreciated   4. Dispo: Acute rehab  5. Drain management x 4   6. F/U MRI Cervical spine

## 2019-02-04 NOTE — CONSULT NOTE ADULT - SUBJECTIVE AND OBJECTIVE BOX
Pain Management Consult Note - [facility] [phone]    Chief Complaint: Lower Back Pain    HPI: Patient seen and examined today, patient laying down in bed, in no apparent distress. Patient s/p ALIF L5-S1, PSF T10-Pelvis. Patient complains of achiness and soreness to his lower back pain and to the surgical site. Patient able to tolerate physical therapy with the walker. Reviewed pain medication regimen with patient. Patient verbalized understanding      Pain is ___ sharp __x__dull ___burning _x__achy ___ Intensity: ____ mild _x__mod _x__severe     Location __x__surgical site ____cervical __x___lumbar ____abd ____upper ext__x__R lower ext    Worse with _x___activity _x___movement _____physical therapy___ Rest    Improved with _x___medication _x___rest ____physical therapy      ROS: Const:  _-__febrile   Eyes:___ENT:___CV: __-_chest pain  Resp: __-__sob  GI:_-__nausea _-__vomiting _-__abd pain ___npo ___clears _x_full diet __bm  :___ Musk: _x__pain _x__spasm  Skin:___ Neuro:  _-__qiavavku_-__plsvfwbmv__-_ numbness _x__weakness _-__paresth  Psych:__anxiety  Endo:___ Heme:___Allergy:_________, _x__all others reviewed and negative      PAST MEDICAL & SURGICAL HISTORY:  PAD (peripheral artery disease)  Hypercholesterolemia  Hypertension  History of lumbosacral spine surgery  H/O laminectomy  History of hernia repair  S/P hip replacement: right hip  S/P knee replacement, bilateral  Z98.1 M43.89  No pertinent family history in first degree relatives  Family history of coronary artery disease  Handoff  MEWS Score  PAD (peripheral artery disease)  Hypercholesterolemia  Hypertension  S/P fusion of thoracic spine  S/P lumbar fusion  Mechanical complication of internal fixation device such as nail, plate or krishna, subsequent encounter  Degenerative disc disease at L5-S1 level  Mechanical complication of internal fixation device such as nail, plate or krishna, subsequent encounter  S/P fusion of thoracic spine  S/P lumbar fusion  Degenerative disc disease at L5-S1 level  Anterior lumbar interbody fusion (ALIF)  Spinal stenosis  DIOGO on CPAP  DM type 1 (diabetes mellitus, type 1)  PAD (peripheral artery disease)  Hypercholesterolemia  Hypertension  Spinal stenosis  Posterior spinal fusion  Anterior lumbar interbody fusion (ALIF)  History of lumbosacral spine surgery  H/O laminectomy  History of hernia repair  S/P hip replacement  S/P knee replacement, bilateral  Posterior spinal fusion      SH: _-__Tobacco   _-__Alcohol                          FH:FAMILY HISTORY:  No pertinent family history in first degree relatives      allopurinol 100 milliGRAM(s) Oral daily  atorvastatin 80 milliGRAM(s) Oral at bedtime  benzocaine 15 mG/menthol 3.6 mG (Sugar-Free) Lozenge 1 Lozenge Oral three times a day PRN  BUpivacaine liposome 1.3% Injectable (no eMAR) 20 milliLiter(s) Local Injection Once  cyclobenzaprine 5 milliGRAM(s) Oral three times a day PRN  dextrose 40% Gel 15 Gram(s) Oral once PRN  dextrose 5%. 1000 milliLiter(s) IV Continuous <Continuous>  dextrose 50% Injectable 12.5 Gram(s) IV Push once  dextrose 50% Injectable 25 Gram(s) IV Push once  dextrose 50% Injectable 25 Gram(s) IV Push once  diazepam    Tablet 5 milliGRAM(s) Oral every 8 hours PRN  docusate sodium 100 milliGRAM(s) Oral three times a day  glucagon  Injectable 1 milliGRAM(s) IntraMuscular once PRN  HYDROmorphone  Injectable 0.5 milliGRAM(s) IV Push every 2 hours PRN  insulin lispro (HumaLOG) corrective regimen sliding scale   SubCutaneous Before meals and at bedtime  losartan 25 milliGRAM(s) Oral daily  metoprolol succinate ER 25 milliGRAM(s) Oral daily  naloxone Injectable 0.1 milliGRAM(s) IV Push Once PRN  ondansetron Injectable 4 milliGRAM(s) IV Push every 6 hours PRN  oxyCODONE    IR 5 milliGRAM(s) Oral every 4 hours PRN  oxyCODONE    IR 10 milliGRAM(s) Oral every 4 hours PRN  senna 2 Tablet(s) Oral at bedtime      T(C): 36.6 (02-04-19 @ 09:29), Max: 37.6 (02-03-19 @ 20:50)  HR: 94 (02-04-19 @ 09:29) (93 - 103)  BP: 132/64 (02-04-19 @ 09:29) (114/53 - 134/63)  RR: 18 (02-04-19 @ 09:29) (15 - 19)  SpO2: 97% (02-04-19 @ 09:29) (94% - 98%)  Wt(kg): --    T(C): 36.6 (02-04-19 @ 09:29), Max: 37.6 (02-03-19 @ 20:50)  HR: 94 (02-04-19 @ 09:29) (93 - 103)  BP: 132/64 (02-04-19 @ 09:29) (114/53 - 134/63)  RR: 18 (02-04-19 @ 09:29) (15 - 19)  SpO2: 97% (02-04-19 @ 09:29) (94% - 98%)  Wt(kg): --    T(C): 36.6 (02-04-19 @ 09:29), Max: 37.6 (02-03-19 @ 20:50)  HR: 94 (02-04-19 @ 09:29) (93 - 103)  BP: 132/64 (02-04-19 @ 09:29) (114/53 - 134/63)  RR: 18 (02-04-19 @ 09:29) (15 - 19)  SpO2: 97% (02-04-19 @ 09:29) (94% - 98%)  Wt(kg): --    PHYSICAL EXAM:  Gen Appearance: x___no acute distress _x__appropriate        Neuro: _x__SILT feet____ EOM Intact Psych: AAOX3__, __x_mood/affect appropriate        Eyes: _x__conjunctiva WNL  ___x__ Pupils equal and round        ENT: __x_ears and nose atraumatic_x__ Hearing grossly intact        Neck: _x__trachea midline, no visible masses ___thyroid without palpable mass    Resp: _x__Nml WOB____No tactile fremitus ___clear to auscultation    Cardio: x___extremities free from edema __x__pedal pulses palpable    GI/Abdomen: _x__soft ___x__ Nontender___x___Nondistended_____HSM    Lymphatic: ___no palpable nodes in neck  ___no palpable nodes calves and feet    Skin/Wound: _x__Incision, ___Dressing c/d/i,   ____surrounding tissues soft,  ___drain/chest tube present____    Muscular: EHL _5__/5  Gastrocnemius_5__/5    ___absent clubbing/cyanosis        ASSESSMENT: This is a 73y old Male with a history of spinal stenosis, s/p ALIF L5-S1, PSF T10-Pelvis, POD #3.       Recommended Treatment PLAN:  1. Oxycodone 5-10mg Po Q4h prn moderate to severe pain  2. Dilaudid 0.5mg Q2h IVP prn breakthrough pain  3. Flexeril 5mg Po Q8h prn muscle spasms  Plan discussed with Santi Matthews

## 2019-02-04 NOTE — PROGRESS NOTE ADULT - PROBLEM SELECTOR PLAN 3
Hypertension is control and the patient is on beta blocker and Lisinopril. The pain might be aggravating the blood pressure which will fluctuates.
Hypertension is control and the patient is on beta blocker and Lisinopril. The pain might be aggravating the blood pressure which will fluctuates.

## 2019-02-04 NOTE — PROGRESS NOTE ADULT - ASSESSMENT
73 M s/p ALIF L5-S1, revision PSF T10-pelvis    - remains tachycardic; cardiology following  - doing well, multiple BMs yesterday, tolerating solids  - pulse exam unchanged    Plan  - vascular will cont. to follow  -Discussed with chief

## 2019-02-04 NOTE — PROGRESS NOTE ADULT - SUBJECTIVE AND OBJECTIVE BOX
Ortho Note    Pt comfortable without complaints, pain controlled  Denies CP, SOB, N/V, numbness/tingling     Vital Signs Last 24 Hrs  T(C): 36.6 (02-04-19 @ 09:29), Max: 36.6 (02-04-19 @ 09:29)  T(F): 97.9 (02-04-19 @ 09:29), Max: 97.9 (02-04-19 @ 09:29)  HR: 90 (02-04-19 @ 13:03) (90 - 94)  BP: 163/67 (02-04-19 @ 13:03) (132/64 - 163/67)  BP(mean): --  RR: 18 (02-04-19 @ 13:03) (18 - 18)  SpO2: 97% (02-04-19 @ 13:03) (97% - 97%)  AVSS    General: Pt Alert and oriented, NAD  Back DSG C/D/I  HVx2 and JPx2 intact holding good suction  Pulses: 2+ DP/PT  Sensation: s/s/sp/dp/t intact  Motor: EHL/FHL/TA/GS 5/5                          8.0    9.2   )-----------( 204      ( 04 Feb 2019 06:15 )             24.7   04 Feb 2019 06:15    138    |  104    |  18     ----------------------------<  120    3.6     |  26     |  0.91     Drain output:    02-03-19 @ 07:01  -  02-04-19 @ 07:00  --------------------------------------------------------  OUT:    Accordian: 80 mL    Accordian: 50 mL    Bulb: 40 mL    Bulb: 45 mL  Total OUT: 215 mL      02-04-19 @ 07:01  -  02-04-19 @ 13:57  --------------------------------------------------------  OUT:    Accordian: 10 mL    Accordian: 30 mL    Bulb: 5 mL    Bulb: 10 mL  Total OUT: 55 mL    A/P: 73M s/p ALIF L4-S1 and PSF T10-pelvis  - Stable  - Pain Control  - DVT ppx: SCDs  - cont drains  - MRI C spine noncon  - OT  - PT, WBS: WBAT, needs walker x 3 months   - dispo planing       Ortho Pager [phone]

## 2019-02-04 NOTE — OCCUPATIONAL THERAPY INITIAL EVALUATION ADULT - PLANNED THERAPY INTERVENTIONS, OT EVAL
bed mobility training/transfer training/IADL retraining/ADL retraining/balance training/strengthening

## 2019-02-04 NOTE — OCCUPATIONAL THERAPY INITIAL EVALUATION ADULT - ADDITIONAL COMMENTS
Pt was living in private home with stairs with spouse.  Pt uses chair lift to get up stairs.  Prior to admission pt was independent with adl/iadls and functional mobility.  Pt ambulates with cane.  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

## 2019-02-04 NOTE — OCCUPATIONAL THERAPY INITIAL EVALUATION ADULT - GENERAL OBSERVATIONS, REHAB EVAL
pt received semi supine in bed, +hemovacs, +shari drains, +IV hep lock, +tele, +wearing glasses, NAD, VSS.

## 2019-02-04 NOTE — OCCUPATIONAL THERAPY INITIAL EVALUATION ADULT - DIAGNOSIS, OT EVAL
Pt cleared for OT eval by TAVO Rodríguez.  Pt impaired for adl/iadls and functional mobility 2* to decreased balance and strength

## 2019-02-04 NOTE — OCCUPATIONAL THERAPY INITIAL EVALUATION ADULT - PERTINENT HX OF CURRENT PROBLEM, REHAB EVAL
72yo male presents with lumbar spine pain x  several years. The patient states he had spinal surgery in June 2018 but that the "rods broke." He denies preceding accident/injury or trauma to the back. He complains of pain to his lower back and denies radiation of pain to his legs. He states that his legs are "sometimes tired."  Pt s/p lumbar interbody fusion L4-Sl with interbody cages and neuromonitoring.  Revision posterior spinal fusion T10-pelvis. 1/31/19.

## 2019-02-04 NOTE — OCCUPATIONAL THERAPY INITIAL EVALUATION ADULT - GROSSLY INTACT, SENSORY
grossly intact; however, b/l hand light touch numbess and tingling on palms of hands and finger tips.  Inconsistent results with light touch testing.

## 2019-02-04 NOTE — PROGRESS NOTE ADULT - PROBLEM SELECTOR PLAN 4
The blood sugar is better controlled and continue on insulin sliding scale. Maintain blood sugar in the range 140/180 and not below 70.  continue current regimen
The blood sugar is better controlled and continue on insulin sliding scale. Maintain blood sugar in the range 140/180 and not below 70.  continue current regimen

## 2019-02-04 NOTE — PROGRESS NOTE ADULT - ATTENDING COMMENTS
Patient seen and examined with house-staff during bedside rounds.  Resident note read, including vitals, physical findings, laboratory data, and radiological reports.   Revisions included below.  Direct personal management at bed side and extensive interpretation of the data.  Plan was outlined and discussed in details with the housestaff.  Decision making of high complexity  Action taken for acute disease activity to reflect the level of care provided:  - medication reconciliation  - review laboratory data
Patient seen and examined with house-staff during bedside rounds.  Resident note read, including vitals, physical findings, laboratory data, and radiological reports.   Revisions included below.  Direct personal management at bed side and extensive interpretation of the data.  Plan was outlined and discussed in details with the housestaff.  Decision making of high complexity  Action taken for acute disease activity to reflect the level of care provided:  - medication reconciliation  - review laboratory data

## 2019-02-04 NOTE — PROGRESS NOTE ADULT - PROBLEM SELECTOR PLAN 5
The patient had ventricular tachycardia and including bigeminy from previous admission.  ECHO unremarkable and seen by cardiology. Patient is a beta blocker and potassium and magnesium level is normal..

## 2019-02-05 PROCEDURE — 99232 SBSQ HOSP IP/OBS MODERATE 35: CPT

## 2019-02-05 RX ORDER — ENOXAPARIN SODIUM 100 MG/ML
40 INJECTION SUBCUTANEOUS DAILY
Qty: 0 | Refills: 0 | Status: DISCONTINUED | OUTPATIENT
Start: 2019-02-05 | End: 2019-02-06

## 2019-02-05 RX ADMIN — SENNA PLUS 2 TABLET(S): 8.6 TABLET ORAL at 23:57

## 2019-02-05 RX ADMIN — Medication 100 MILLIGRAM(S): at 23:57

## 2019-02-05 RX ADMIN — LOSARTAN POTASSIUM 25 MILLIGRAM(S): 100 TABLET, FILM COATED ORAL at 05:50

## 2019-02-05 RX ADMIN — ATORVASTATIN CALCIUM 80 MILLIGRAM(S): 80 TABLET, FILM COATED ORAL at 23:57

## 2019-02-05 RX ADMIN — Medication 25 MILLIGRAM(S): at 05:50

## 2019-02-05 RX ADMIN — OXYCODONE HYDROCHLORIDE 5 MILLIGRAM(S): 5 TABLET ORAL at 13:25

## 2019-02-05 RX ADMIN — ENOXAPARIN SODIUM 40 MILLIGRAM(S): 100 INJECTION SUBCUTANEOUS at 12:25

## 2019-02-05 RX ADMIN — OXYCODONE HYDROCHLORIDE 5 MILLIGRAM(S): 5 TABLET ORAL at 12:26

## 2019-02-05 RX ADMIN — Medication 100 MILLIGRAM(S): at 12:25

## 2019-02-05 NOTE — PROGRESS NOTE ADULT - SUBJECTIVE AND OBJECTIVE BOX
Pain Management Progress Note - [facility] [phone]      HPI: Patient seen and examined today, patient laying down in bed, in no apparent distress. Patient s/p ALIF L5-S1, PSF T10-Pelvis, pod #4, pain controlled with current pain medication regimen.  Patient Axox3, denies n,v,c, no s/s of oversedation.     Pain is ___ sharp __x__dull ___burning _x__achy ___ Intensity: ____ mild _x__mod _x__severe     Location __x__surgical site ____cervical __x___lumbar ____abd ____upper ext__x__R lower ext    Worse with _x___activity _x___movement _____physical therapy___ Rest    Improved with _x___medication _x___rest ____physical therapy      atorvastatin  metoprolol tartrate  losartan  allopurinol  lactated ringers.  ondansetron Injectable  docusate sodium  senna  HYDROmorphone PCA (1 mG/mL)  HYDROmorphone PCA (1 mG/mL) Rescue Clinician Bolus  naloxone Injectable  diazepam    Tablet  ceFAZolin   IVPB  metoprolol succinate ER  acetaminophen  IVPB ..  HYDROmorphone PCA (1 mG/mL)  insulin lispro (HumaLOG) corrective regimen sliding scale  dextrose 5%.  dextrose 40% Gel  dextrose 50% Injectable  dextrose 50% Injectable  dextrose 50% Injectable  glucagon  Injectable  benzocaine 15 mG/menthol 3.6 mG (Sugar-Free) Lozenge  sodium chloride 0.9% Bolus  acetaminophen  IVPB ..  acetaminophen   Tablet ..  dextrose 5%.  oxyCODONE    IR  oxyCODONE    IR  HYDROmorphone  Injectable  cyclobenzaprine  enoxaparin Injectable      ROS: Const:  _-__febrile   Eyes:___ENT:___CV: __-_chest pain  Resp: __-__sob  GI:_-__nausea _-__vomiting _-__abd pain ___npo ___clears _x_full diet __bm  :___ Musk: _x__pain _x__spasm  Skin:___ Neuro:  _-__gbnwiehv_-__spvrbswpu__-_ numbness _x__weakness _-__paresth  Psych:__anxiety  Endo:___ Heme:___Allergy:_________, _x__all others reviewed and negative        PAST MEDICAL & SURGICAL HISTORY:  PAD (peripheral artery disease)  Hypercholesterolemia  Hypertension  History of lumbosacral spine surgery  H/O laminectomy  History of hernia repair  S/P hip replacement: right hip  S/P knee replacement, bilateral  Z98.1 M43.89  No pertinent family history in first degree relatives  Family history of coronary artery disease  Handoff  MEWS Score  PAD (peripheral artery disease)  Hypercholesterolemia  Hypertension  S/P fusion of thoracic spine  S/P lumbar fusion  Mechanical complication of internal fixation device such as nail, plate or krishna, subsequent encounter  Degenerative disc disease at L5-S1 level  Mechanical complication of internal fixation device such as nail, plate or krishna, subsequent encounter  S/P fusion of thoracic spine  S/P lumbar fusion  Degenerative disc disease at L5-S1 level  Anterior lumbar interbody fusion (ALIF)  Spinal stenosis  DIOGO on CPAP  DM type 1 (diabetes mellitus, type 1)  PAD (peripheral artery disease)  Hypercholesterolemia  Hypertension  Spinal stenosis  Posterior spinal fusion  Anterior lumbar interbody fusion (ALIF)  History of lumbosacral spine surgery  H/O laminectomy  History of hernia repair  S/P hip replacement  S/P knee replacement, bilateral  Posterior spinal fusion          Hemoglobin: 8.0 g/dL (02-04 @ 06:15)        T(C): 36.8 (02-05-19 @ 08:24), Max: 37.2 (02-04-19 @ 20:49)  HR: 86 (02-05-19 @ 08:24) (84 - 101)  BP: 131/62 (02-05-19 @ 08:24) (114/57 - 163/67)  RR: 17 (02-05-19 @ 08:24) (16 - 18)  SpO2: 97% (02-05-19 @ 08:24) (96% - 100%)  Wt(kg): --       PHYSICAL EXAM:  Gen Appearance: x___no acute distress _x__appropriate        Neuro: _x__SILT feet____ EOM Intact Psych: AAOX3__, __x_mood/affect appropriate        Eyes: _x__conjunctiva WNL  ___x__ Pupils equal and round        ENT: __x_ears and nose atraumatic_x__ Hearing grossly intact        Neck: _x__trachea midline, no visible masses ___thyroid without palpable mass    Resp: _x__Nml WOB____No tactile fremitus ___clear to auscultation    Cardio: x___extremities free from edema __x__pedal pulses palpable    GI/Abdomen: _x__soft ___x__ Nontender___x___Nondistended_____HSM    Lymphatic: ___no palpable nodes in neck  ___no palpable nodes calves and feet    Skin/Wound: _x__Incision, ___Dressing c/d/i,   ____surrounding tissues soft,  ___drain/chest tube present____    Muscular: EHL _5__/5  Gastrocnemius_5__/5    ___absent clubbing/cyanosis        ASSESSMENT: This is a 73y old Male with a history of spinal stenosis, s/p ALIF L5-S1, PSF T10-Pelvis, POD #4, pain controlled with current pain medication regimen.       Recommended Treatment PLAN:  1. Oxycodone 5-10mg Po Q4h prn moderate to severe pain  2. Dilaudid 0.5mg Q2h IVP prn breakthrough pain  3. Flexeril 5mg Po Q8h prn muscle spasms  Plan discussed with Dawson Ray

## 2019-02-05 NOTE — DIETITIAN INITIAL EVALUATION ADULT. - NS AS NUTRI INTERV ED CONTENT
discussed increasing protein and fiber intake to help promote wt loss. Discussed lean sources of protein as well as sources of fiber./Purpose of the nutrition education discussed increasing protein and fiber intake to help promote wt loss. Discussed lean sources of protein as well as sources of fiber, discussed portion control/Purpose of the nutrition education

## 2019-02-05 NOTE — PROGRESS NOTE ADULT - SUBJECTIVE AND OBJECTIVE BOX
Chief Complaint/Reason for Consult: periop CV Eval  INTERVAL HPI: no longer orthostatic, no syncope palp cp  	  MEDICATIONS:  losartan 25 milliGRAM(s) Oral daily  metoprolol succinate ER 25 milliGRAM(s) Oral daily        cyclobenzaprine 5 milliGRAM(s) Oral three times a day PRN  diazepam    Tablet 5 milliGRAM(s) Oral every 8 hours PRN  HYDROmorphone  Injectable 0.5 milliGRAM(s) IV Push every 2 hours PRN  ondansetron Injectable 4 milliGRAM(s) IV Push every 6 hours PRN  oxyCODONE    IR 5 milliGRAM(s) Oral every 4 hours PRN  oxyCODONE    IR 10 milliGRAM(s) Oral every 4 hours PRN    docusate sodium 100 milliGRAM(s) Oral three times a day  senna 2 Tablet(s) Oral at bedtime    allopurinol 100 milliGRAM(s) Oral daily  atorvastatin 80 milliGRAM(s) Oral at bedtime  dextrose 40% Gel 15 Gram(s) Oral once PRN  dextrose 50% Injectable 12.5 Gram(s) IV Push once  dextrose 50% Injectable 25 Gram(s) IV Push once  dextrose 50% Injectable 25 Gram(s) IV Push once  glucagon  Injectable 1 milliGRAM(s) IntraMuscular once PRN  insulin lispro (HumaLOG) corrective regimen sliding scale   SubCutaneous Before meals and at bedtime    benzocaine 15 mG/menthol 3.6 mG (Sugar-Free) Lozenge 1 Lozenge Oral three times a day PRN  enoxaparin Injectable 40 milliGRAM(s) SubCutaneous daily      REVIEW OF SYSTEMS:  [x] As per HPI  CONSTITUTIONAL: No fever, weight loss, or fatigue  RESPIRATORY: No cough, wheezing, chills or hemoptysis; No Shortness of Breath  CARDIOVASCULAR: No chest pain, palpitations, dizziness, or leg swelling  GASTROINTESTINAL: No abdominal or epigastric pain. No nausea, vomiting, or hematemesis; No diarrhea or constipation. No melena or hematochezia.  MUSCULOSKELETAL: No joint pain or swelling; No muscle, back, or extremity pain  [x] All others negative	  [ ] Unable to obtain    PHYSICAL EXAM:  T(C): 36.7 (02-05-19 @ 16:32), Max: 37.2 (02-04-19 @ 20:49)  HR: 93 (02-05-19 @ 16:32) (84 - 101)  BP: 119/64 (02-05-19 @ 16:32) (119/64 - 143/63)  RR: 16 (02-05-19 @ 16:32) (16 - 18)  SpO2: 98% (02-05-19 @ 16:32) (95% - 99%)  Wt(kg): --  I&O's Summary    04 Feb 2019 07:01  -  05 Feb 2019 07:00  --------------------------------------------------------  IN: 0 mL / OUT: 715 mL / NET: -715 mL    05 Feb 2019 07:01  -  05 Feb 2019 17:44  --------------------------------------------------------  IN: 0 mL / OUT: 200 mL / NET: -200 mL          Appearance: Normal	  HEENT:   Normal oral mucosa  Cardiovascular: Normal S1 S2, No JVD, No murmurs, No edema  Respiratory: Lungs clear to auscultation	  Gastrointestinal:  Soft, Non-tender, + BS	  Extremities: Normal range of motion, No clubbing, cyanosis or edema  Vascular: Peripheral pulses palpable 2+ bilaterally    TELEMETRY: 	    ECG:   	  RADIOLOGY:   CXR:  CT:  US:    CARDIAC TESTING:  Echocardiogram:  Catheterization:  Stress Test:      LABS:	 	    CARDIAC MARKERS:                                  8.0    9.2   )-----------( 204      ( 04 Feb 2019 06:15 )             24.7     02-04    138  |  104  |  18  ----------------------------<  120<H>  3.6   |  26  |  0.91    Ca    8.1<L>      04 Feb 2019 06:15      proBNP:   Lipid Profile:   HgA1c:   TSH:     ASSESSMENT/PLAN: 	    # post op s complications  orthostatic hypotension resolved  Echo preserved EF normal diasolic fxn    #CAD - ekg non ischemic  echo normal EF normal pressures, unchanged  no cp sob palp    #HTN - at goal, pain control   losartan 25 milliGRAM(s) Oral daily  metoprolol succinate ER 25 milliGRAM(s) Oral daily

## 2019-02-05 NOTE — DIETITIAN INITIAL EVALUATION ADULT. - ENERGY NEEDS
Ht (1/31): 188cm, Wt (2/5 per pt): 131.8kg, IBW: 190# +/-10%, %IBW: 153%, BMI: 37.2, Adjusted wt: 97.7kg   Using adjusted body weight as pt >120% IBW and BMI >30. Needs adjusted s/p surgery.

## 2019-02-05 NOTE — PROGRESS NOTE ADULT - SUBJECTIVE AND OBJECTIVE BOX
Ortho Note    Pt comfortable without complaints, pain controlled  Denies CP, SOB, N/V, numbness/tingling     Vital Signs Last 24 Hrs  T(C): 36.8 (02-05-19 @ 08:24), Max: 36.8 (02-05-19 @ 08:24)  T(F): 98.3 (02-05-19 @ 08:24), Max: 98.3 (02-05-19 @ 08:24)  HR: 86 (02-05-19 @ 08:24) (86 - 90)  BP: 131/62 (02-05-19 @ 08:24) (131/62 - 143/63)  BP(mean): --  RR: 17 (02-05-19 @ 08:24) (17 - 17)  SpO2: 97% (02-05-19 @ 08:24) (97% - 97%)    General: Pt Alert and oriented, NAD  Back DSG C/D/I  HVx2 and JPx2 intact holding good suction  Pulses: 2+ DP/PT  Sensation: s/s/sp/dp/t intact  Motor: EHL/FHL/TA/GS 5/5                          8.0    9.2   )-----------( 204      ( 04 Feb 2019 06:15 )             24.7   04 Feb 2019 06:15    138    |  104    |  18     ----------------------------<  120    3.6     |  26     |  0.91     Drain output:    02-04-19 @ 07:01  -  02-05-19 @ 07:00  --------------------------------------------------------  OUT:    Accordian: 22.5 mL    Accordian: 35 mL    Bulb: 45 mL    Bulb: 12.5 mL  Total OUT: 115 mL      A/P: 73M s/p ALIF L4-S1 and PSF T10-pelvis  - Stable  - Pain Control  - DVT ppx: SCDs  - will dc deep HV drains and KARON drain with minimal output   - MRI C spine noncon  - DC fluids  - OT  - PT, WBS: WBAT, needs walker x 3 months   - dispo GAIL    Ortho Pager [phone]

## 2019-02-05 NOTE — PROGRESS NOTE ADULT - SUBJECTIVE AND OBJECTIVE BOX
Orthopaedic Surgery Progress Note    Patient seen and examined. SILVINO. Patient without complaints. Pain controlled. Denies CP, SOB, N/V, tactile fevers, calf pain.  Deep HV x 2 and KARON x 1 d/c'd this morning. Pt states his bilateral hand weakness that he endorsed yesterday has significantly improved.       Vital Signs Last 24 Hrs  T(C): 36.8 (05 Feb 2019 08:24), Max: 37.2 (04 Feb 2019 20:49)  T(F): 98.3 (05 Feb 2019 08:24), Max: 99 (04 Feb 2019 20:49)  HR: 86 (05 Feb 2019 08:24) (84 - 101)  BP: 131/62 (05 Feb 2019 08:24) (114/57 - 143/63)  BP(mean): 86 (04 Feb 2019 14:51) (86 - 86)  RR: 17 (05 Feb 2019 08:24) (16 - 18)  SpO2: 97% (05 Feb 2019 08:24) (96% - 100%)         CAPILLARY BLOOD GLUCOSE      POCT Blood Glucose.: 113 mg/dL (05 Feb 2019 12:02)  POCT Blood Glucose.: 133 mg/dL (05 Feb 2019 07:03)  POCT Blood Glucose.: 126 mg/dL (04 Feb 2019 22:34)  POCT Blood Glucose.: 115 mg/dL (04 Feb 2019 17:23)                  Physical Exam:  Pt laying comfortably in bed, NAD.  Skin warm and well perfused, no visible erythema/ecchymoses.  Dressing C/D/I; HV x 1   EHL/FHL/TA/GS 5/5 motor strength bilateral lower extremities  SLT in tact and equal to distal bilateral lower extremities   DP/PT pulses 2+  Bilateral  strength improved; AIN/PIN in tact; SLT in tact to distal bilateral upper extremities     LABS                        8.0    9.2   )-----------( 204      ( 04 Feb 2019 06:15 )             24.7                                02-04    138  |  104  |  18  ----------------------------<  120<H>  3.6   |  26  |  0.91    Ca    8.1<L>      04 Feb 2019 06:15        A/P: 73M POD # 5 s/p ALIF L5-S1, Revision PSF T10-Pelvis     CONTINUE:        1. PT, WBAT  2. DVT prophylaxis: SCDS  3. Pain Control as needed   4. Dispo: Acute Rehab  5. Drain management

## 2019-02-05 NOTE — DIETITIAN INITIAL EVALUATION ADULT. - OTHER INFO
72 yo M with hx PAD, HLD, HTN, with lumbar spine pain for several years, now s/p ALIF L4-S1 and PSF T10-pelvis 1/31, POD 5 pt stable. Of note, documents state pt with DM1 but pt denies hx DM- recommend check A1c for clarification, BG levels controlled at this time. Pt reports good appetite PTA, limits salt in diet, pt reports decreased PO intake since admission 2/2 lack of desire to eat (6 days), pt reports consuming 25-50% of meals. GI: pt denies N/V, small loose brown BM 2/5 per flowsheet, pt reports normal/regular BM PTA. Pain controlled. Skin: surgical incision anterior abdomen, back. Recommend change diet to DASH/TLC as medically appropriate. Discussed increased protein needs with pt s/p surgery. Encouraged pt to increase intake of fiber rich foods for weight loss. Also discussed limiting salt, chol and saturated fat in diet. Pt receptive to diet recommendations. Will monitor and f/u per nutrition dept protocol.

## 2019-02-06 VITALS
DIASTOLIC BLOOD PRESSURE: 67 MMHG | RESPIRATION RATE: 18 BRPM | SYSTOLIC BLOOD PRESSURE: 126 MMHG | TEMPERATURE: 98 F | OXYGEN SATURATION: 96 % | HEART RATE: 91 BPM

## 2019-02-06 RX ORDER — CYCLOBENZAPRINE HYDROCHLORIDE 10 MG/1
1 TABLET, FILM COATED ORAL
Qty: 0 | Refills: 0 | DISCHARGE
Start: 2019-02-06

## 2019-02-06 RX ORDER — DIAZEPAM 5 MG
1 TABLET ORAL
Qty: 0 | Refills: 0 | DISCHARGE
Start: 2019-02-06

## 2019-02-06 RX ORDER — OXYCODONE HYDROCHLORIDE 5 MG/1
1 TABLET ORAL
Qty: 0 | Refills: 0 | DISCHARGE
Start: 2019-02-06

## 2019-02-06 RX ORDER — ENOXAPARIN SODIUM 100 MG/ML
40 INJECTION SUBCUTANEOUS
Qty: 0 | Refills: 0 | DISCHARGE
Start: 2019-02-06

## 2019-02-06 RX ORDER — DOCUSATE SODIUM 100 MG
1 CAPSULE ORAL
Qty: 0 | Refills: 0 | DISCHARGE
Start: 2019-02-06

## 2019-02-06 RX ADMIN — LOSARTAN POTASSIUM 25 MILLIGRAM(S): 100 TABLET, FILM COATED ORAL at 05:49

## 2019-02-06 RX ADMIN — Medication 100 MILLIGRAM(S): at 05:49

## 2019-02-06 RX ADMIN — Medication 25 MILLIGRAM(S): at 05:49

## 2019-02-06 RX ADMIN — Medication 100 MILLIGRAM(S): at 15:47

## 2019-02-06 RX ADMIN — ENOXAPARIN SODIUM 40 MILLIGRAM(S): 100 INJECTION SUBCUTANEOUS at 15:47

## 2019-02-06 NOTE — PROGRESS NOTE ADULT - SUBJECTIVE AND OBJECTIVE BOX
Pain Management Progress Note - [facility] [phone]      HPI: Patient seen and examined today, patient laying down in bed, in no apparent distress. Patient s/p ALIF L5-S1, PSF T10-Pelvis, pod #5, pain managed with current pain medication regimen.  Patient Axox3, denies n,v,c, no s/s of oversedation, patient tolerating PT.      Pain is ___ sharp __x__dull ___burning _x__achy ___ Intensity: ____ mild _x__mod _x__severe     Location __x__surgical site ____cervical __x___lumbar ____abd ____upper ext__x__R lower ext    Worse with _x___activity _x___movement _____physical therapy___ Rest    Improved with _x___medication _x___rest ____physical therapy      BUpivacaine liposome 1.3% Injectable (no eMAR)  atorvastatin  metoprolol tartrate  losartan  allopurinol  lactated ringers.  ondansetron Injectable  docusate sodium  senna  HYDROmorphone PCA (1 mG/mL)  HYDROmorphone PCA (1 mG/mL) Rescue Clinician Bolus  naloxone Injectable  diazepam    Tablet  acetaminophen  IVPB ..  HYDROmorphone PCA (1 mG/mL)  insulin lispro (HumaLOG) corrective regimen sliding scale  dextrose 5%.  dextrose 40% Gel  dextrose 50% Injectable  dextrose 50% Injectable  dextrose 50% Injectable  glucagon  Injectable  benzocaine 15 mG/menthol 3.6 mG (Sugar-Free) Lozenge  sodium chloride 0.9% Bolus  acetaminophen  IVPB ..  acetaminophen   Tablet ..  dextrose 5%.  oxyCODONE    IR  oxyCODONE    IR  HYDROmorphone  Injectable  cyclobenzaprine  enoxaparin Injectable      ROS: Const:  _-__febrile   Eyes:___ENT:___CV: __-_chest pain  Resp: __-__sob  GI:_-__nausea _-__vomiting _-__abd pain ___npo ___clears _x_full diet __bm  :___ Musk: _x__pain _x__spasm  Skin:___ Neuro:  _-__lozokeme_-__mqmaykeew__-_ numbness _x__weakness _-__paresth  Psych:__anxiety  Endo:___ Heme:___Allergy:_________, _x__all others reviewed and negative        PAST MEDICAL & SURGICAL HISTORY:  PAD (peripheral artery disease)  Hypercholesterolemia  Hypertension  History of lumbosacral spine surgery  H/O laminectomy  History of hernia repair  S/P hip replacement: right hip  S/P knee replacement, bilateral  Z98.1 M43.89  No pertinent family history in first degree relatives  Family history of coronary artery disease  Handoff  MEWS Score  PAD (peripheral artery disease)  Hypercholesterolemia  Hypertension  S/P fusion of thoracic spine  S/P lumbar fusion  Mechanical complication of internal fixation device such as nail, plate or krishna, subsequent encounter  Degenerative disc disease at L5-S1 level  Mechanical complication of internal fixation device such as nail, plate or krishna, subsequent encounter  S/P fusion of thoracic spine  S/P lumbar fusion  Degenerative disc disease at L5-S1 level  Anterior lumbar interbody fusion (ALIF)  Spinal stenosis  DIOGO on CPAP  DM type 1 (diabetes mellitus, type 1)  PAD (peripheral artery disease)  Hypercholesterolemia  Hypertension  Spinal stenosis  Posterior spinal fusion  Anterior lumbar interbody fusion (ALIF)  History of lumbosacral spine surgery  H/O laminectomy  History of hernia repair  S/P hip replacement  S/P knee replacement, bilateral  Posterior spinal fusion                T(C): 37.6 (02-06-19 @ 09:49), Max: 37.6 (02-06-19 @ 09:49)  HR: 94 (02-06-19 @ 09:49) (86 - 94)  BP: 146/73 (02-06-19 @ 09:49) (119/64 - 165/71)  RR: 18 (02-06-19 @ 09:49) (16 - 18)  SpO2: 97% (02-06-19 @ 09:49) (94% - 98%)  Wt(kg): --        PHYSICAL EXAM:  Gen Appearance: x___no acute distress _x__appropriate        Neuro: _x__SILT feet____ EOM Intact Psych: AAOX3__, __x_mood/affect appropriate        Eyes: _x__conjunctiva WNL  ___x__ Pupils equal and round        ENT: __x_ears and nose atraumatic_x__ Hearing grossly intact        Neck: _x__trachea midline, no visible masses ___thyroid without palpable mass    Resp: _x__Nml WOB____No tactile fremitus ___clear to auscultation    Cardio: x___extremities free from edema __x__pedal pulses palpable    GI/Abdomen: _x__soft ___x__ Nontender___x___Nondistended_____HSM    Lymphatic: ___no palpable nodes in neck  ___no palpable nodes calves and feet    Skin/Wound: _x__Incision, ___Dressing c/d/i,   ____surrounding tissues soft,  ___drain/chest tube present____    Muscular: EHL _5__/5  Gastrocnemius_5__/5    ___absent clubbing/cyanosis        ASSESSMENT: This is a 73y old Male with a history of spinal stenosis, s/p ALIF L5-S1, PSF T10-Pelvis, POD #5, pain controlled with current pain medication regimen.       Recommended Treatment PLAN:  1. Oxycodone 5-10mg Po Q4h prn moderate to severe pain  2. Dilaudid 0.5mg Q2h IVP prn breakthrough pain  3. Flexeril 5mg Po Q8h prn muscle spasms  Plan discussed with Dawson Ray

## 2019-02-06 NOTE — PROGRESS NOTE ADULT - SUBJECTIVE AND OBJECTIVE BOX
Orthopaedic Surgery Progress Note    Patient seen and examined. SILVINO. Patient without complaints. Pain controlled. Denies CP, SOB, N/V, tactile fevers, calf pain.      Vital Signs Last 24 Hrs  T(C): 37.6 (06 Feb 2019 09:49), Max: 37.6 (06 Feb 2019 09:49)  T(F): 99.7 (06 Feb 2019 09:49), Max: 99.7 (06 Feb 2019 09:49)  HR: 94 (06 Feb 2019 09:49) (86 - 94)  BP: 146/73 (06 Feb 2019 09:49) (119/64 - 165/71)  BP(mean): --  RR: 18 (06 Feb 2019 09:49) (16 - 18)  SpO2: 97% (06 Feb 2019 09:49) (94% - 98%)         CAPILLARY BLOOD GLUCOSE      POCT Blood Glucose.: 123 mg/dL (06 Feb 2019 06:51)  POCT Blood Glucose.: 148 mg/dL (05 Feb 2019 22:31)  POCT Blood Glucose.: 122 mg/dL (05 Feb 2019 17:25)                  Physical Exam:  Pt laying comfortably in bed, NAD.  Skin warm and well perfused, no visible erythema/ecchymoses.  Dressing C/D/I; KARON x 1   EHL/FHL/TA/GS 5/5 motor strength bilateral lower extremities   SLT in tact and equal to distal bilateral lower extremities  Abdomen soft  Calves nontender to palpation  DP/PT pulses 2+      A/P: 73M POD #6 s/p ALIF L5-S1, PSF T10-pelvis     CONTINUE:        1. PT - WBAT  2. DVT prophylaxis - SCDs  3. Pain Control as needed   4. Dispo: [facility], will d/c drain before transport

## 2019-02-06 NOTE — PROGRESS NOTE ADULT - REASON FOR ADMISSION
Lower back pain

## 2019-02-06 NOTE — PROGRESS NOTE ADULT - ASSESSMENT
Pt doing well POD#6 s/p paraspinal muscle flap closure after spine surgery.   1. Maintain KARON until less than 20mL/24 hours  2. follow-up in office after discharge [phone]  3. Ok to shower 2 days after KARON drain has been removed

## 2019-02-06 NOTE — PROGRESS NOTE ADULT - SUBJECTIVE AND OBJECTIVE BOX
Neurology Follow up note    Name  NANDO HERNANDEZ    HPI:  74yo male presents with lumbar spine pain x  several years. The patient states he had spinal surgery in June 2018 but that the "rods broke." He denies preceding accident/injury or trauma to the back. He complains of pain to his low back and denies radiation of pain to his legs. He states that his legs are "sometimes tired." The patient states he has been ambulating with a cane for several years. The patient tried conservative therapies which failed to alleviate his symptoms. Denies numbness/tingling down the lower extremities and any bowel/bladder incontinence. Denies hx of DVT. The patient feels well today and denies chest pain/shortness of breath/fever/chills.   Presents for elective anterior lumbar interbody fusion L4-Sl with interbody cages and neuromonitoring.  Revision posterior spinal fusion T10-pelvis with instrumentation and neuromonitoring. (30 Jan 2019 14:08)      Interval History - back pain and discomfort in the low back- hands slightly swollen        REVIEW OF SYSTEMS    Vital Signs Last 24 Hrs  T(C): 36.6 (06 Feb 2019 05:47), Max: 36.9 (06 Feb 2019 04:58)  T(F): 97.8 (06 Feb 2019 05:47), Max: 98.5 (06 Feb 2019 04:58)  HR: 92 (06 Feb 2019 05:47) (86 - 94)  BP: 144/73 (06 Feb 2019 05:47) (119/64 - 165/71)  BP(mean): --  RR: 17 (06 Feb 2019 05:47) (16 - 17)  SpO2: 94% (06 Feb 2019 05:47) (94% - 98%)    Physical Exam-     Mental Status- awake and alert    Cranial Nerves- full EOM    Gait and station- n/a    Motor- moves all 4 extremities    Reflexes- decreased    Sensation- no sensory loss    Coordination-    Vascular -    Medications  allopurinol 100 milliGRAM(s) Oral daily  atorvastatin 80 milliGRAM(s) Oral at bedtime  benzocaine 15 mG/menthol 3.6 mG (Sugar-Free) Lozenge 1 Lozenge Oral three times a day PRN  BUpivacaine liposome 1.3% Injectable (no eMAR) 20 milliLiter(s) Local Injection Once  cyclobenzaprine 5 milliGRAM(s) Oral three times a day PRN  dextrose 40% Gel 15 Gram(s) Oral once PRN  dextrose 50% Injectable 12.5 Gram(s) IV Push once  dextrose 50% Injectable 25 Gram(s) IV Push once  dextrose 50% Injectable 25 Gram(s) IV Push once  diazepam    Tablet 5 milliGRAM(s) Oral every 8 hours PRN  docusate sodium 100 milliGRAM(s) Oral three times a day  enoxaparin Injectable 40 milliGRAM(s) SubCutaneous daily  glucagon  Injectable 1 milliGRAM(s) IntraMuscular once PRN  HYDROmorphone  Injectable 0.5 milliGRAM(s) IV Push every 2 hours PRN  insulin lispro (HumaLOG) corrective regimen sliding scale   SubCutaneous Before meals and at bedtime  losartan 25 milliGRAM(s) Oral daily  metoprolol succinate ER 25 milliGRAM(s) Oral daily  naloxone Injectable 0.1 milliGRAM(s) IV Push Once PRN  ondansetron Injectable 4 milliGRAM(s) IV Push every 6 hours PRN  oxyCODONE    IR 5 milliGRAM(s) Oral every 4 hours PRN  oxyCODONE    IR 10 milliGRAM(s) Oral every 4 hours PRN  senna 2 Tablet(s) Oral at bedtime      Lab      Radiology    Assessment- Lumbar radiculopathy    Plan Rehab

## 2019-02-06 NOTE — PROGRESS NOTE ADULT - PROVIDER SPECIALTY LIST ADULT
Cardiology
Cardiology
Internal Medicine
Neurology
Orthopedics
Pain Medicine
Pain Medicine
Plastic Surgery
Vascular Surgery
Cardiology
Pulmonology

## 2019-02-06 NOTE — PROGRESS NOTE ADULT - SUBJECTIVE AND OBJECTIVE BOX
Ortho Note    Pt comfortable without complaints, pain controlled  Denies CP, SOB, N/V, numbness/tingling     Vital Signs Last 24 Hrs  T(C): 36.6 (02-06-19 @ 05:47), Max: 36.9 (02-06-19 @ 04:58)  T(F): 97.8 (02-06-19 @ 05:47), Max: 98.5 (02-06-19 @ 04:58)  HR: 92 (02-06-19 @ 05:47) (89 - 92)  BP: 144/73 (02-06-19 @ 05:47) (144/73 - 165/71)  BP(mean): --  RR: 17 (02-06-19 @ 05:47) (17 - 17)  SpO2: 94% (02-06-19 @ 05:47) (94% - 94%)    General: Pt Alert and oriented, NAD  Back DSG C/D/I  JPx1 intact holding good suction  Pulses: 2+ DP/PT  Sensation: s/s/sp/dp/t intact  Motor: EHL/FHL/TA/GS 5/5    Drain output:    02-05-19 @ 07:01  -  02-06-19 @ 07:00  --------------------------------------------------------  OUT:    Bulb: 35 mL  Total OUT: 35 mL      A/P:  73M s/p ALIF L4-S1 and PSF T10-pelvis  - Stable  - Pain Control  - DVT ppx: SCDs  - may dc KARON drain  - OT  - PT, WBS: WBAT, needs walker x 3 months   - dispo GALI    Ortho Pager [phone]

## 2019-02-06 NOTE — PROGRESS NOTE ADULT - SUBJECTIVE AND OBJECTIVE BOX
Pt seen and examined.   Comfortable. No acute events.     Exam:  AVSS. Drain thin.   Incision c/d/i. No infection, separation or collection.

## 2019-02-08 ENCOUNTER — MOBILE ON CALL (OUTPATIENT)
Age: 74
End: 2019-02-08

## 2019-02-11 DIAGNOSIS — E10.65 TYPE 1 DIABETES MELLITUS WITH HYPERGLYCEMIA: ICD-10-CM

## 2019-02-11 DIAGNOSIS — E66.9 OBESITY, UNSPECIFIED: ICD-10-CM
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
## 2019-02-11 DIAGNOSIS — E10.51 TYPE 1 DIABETES MELLITUS WITH DIABETIC PERIPHERAL ANGIOPATHY WITHOUT GANGRENE: ICD-10-CM

## 2019-02-11 DIAGNOSIS — I25.10 ATHEROSCLEROTIC HEART DISEASE OF NATIVE CORONARY ARTERY WITHOUT ANGINA PECTORIS: ICD-10-CM

## 2019-02-11 DIAGNOSIS — M96.0 PSEUDARTHROSIS AFTER FUSION OR ARTHRODESIS: ICD-10-CM

## 2019-02-11 DIAGNOSIS — D64.9 ANEMIA, UNSPECIFIED: ICD-10-CM

## 2019-02-11 DIAGNOSIS — I10 ESSENTIAL (PRIMARY) HYPERTENSION: ICD-10-CM

## 2019-02-11 DIAGNOSIS — M47.20 OTHER SPONDYLOSIS WITH RADICULOPATHY, SITE UNSPECIFIED: ICD-10-CM

## 2019-02-11 DIAGNOSIS — G47.33 OBSTRUCTIVE SLEEP APNEA (ADULT) (PEDIATRIC): ICD-10-CM

## 2019-02-11 DIAGNOSIS — I95.1 ORTHOSTATIC HYPOTENSION: ICD-10-CM

## 2019-02-11 DIAGNOSIS — E78.00 PURE HYPERCHOLESTEROLEMIA, UNSPECIFIED: ICD-10-CM

## 2019-02-11 DIAGNOSIS — T84.216A BREAKDOWN (MECHANICAL) OF INTERNAL FIXATION DEVICE OF VERTEBRAE, INITIAL ENCOUNTER: ICD-10-CM

## 2019-02-11 DIAGNOSIS — Z79.4 LONG TERM (CURRENT) USE OF INSULIN: ICD-10-CM

## 2019-02-11 DIAGNOSIS — I47.2 VENTRICULAR TACHYCARDIA: ICD-10-CM

## 2019-02-26 PROCEDURE — C8929: CPT

## 2019-02-26 PROCEDURE — C1889: CPT

## 2019-02-26 PROCEDURE — 82962 GLUCOSE BLOOD TEST: CPT

## 2019-02-26 PROCEDURE — 36415 COLL VENOUS BLD VENIPUNCTURE: CPT

## 2019-02-26 PROCEDURE — 88300 SURGICAL PATH GROSS: CPT

## 2019-02-26 PROCEDURE — 97530 THERAPEUTIC ACTIVITIES: CPT

## 2019-02-26 PROCEDURE — 82330 ASSAY OF CALCIUM: CPT

## 2019-02-26 PROCEDURE — C1713: CPT

## 2019-02-26 PROCEDURE — 86901 BLOOD TYPING SEROLOGIC RH(D): CPT

## 2019-02-26 PROCEDURE — 84295 ASSAY OF SERUM SODIUM: CPT

## 2019-02-26 PROCEDURE — 97116 GAIT TRAINING THERAPY: CPT

## 2019-02-26 PROCEDURE — 85018 HEMOGLOBIN: CPT

## 2019-02-26 PROCEDURE — 71045 X-RAY EXAM CHEST 1 VIEW: CPT

## 2019-02-26 PROCEDURE — 94660 CPAP INITIATION&MGMT: CPT

## 2019-02-26 PROCEDURE — 83036 HEMOGLOBIN GLYCOSYLATED A1C: CPT

## 2019-02-26 PROCEDURE — 85027 COMPLETE CBC AUTOMATED: CPT

## 2019-02-26 PROCEDURE — 85025 COMPLETE CBC W/AUTO DIFF WBC: CPT

## 2019-02-26 PROCEDURE — 86900 BLOOD TYPING SEROLOGIC ABO: CPT

## 2019-02-26 PROCEDURE — 97161 PT EVAL LOW COMPLEX 20 MIN: CPT

## 2019-02-26 PROCEDURE — 72082 X-RAY EXAM ENTIRE SPI 2/3 VW: CPT

## 2019-02-26 PROCEDURE — 84132 ASSAY OF SERUM POTASSIUM: CPT

## 2019-02-26 PROCEDURE — 80048 BASIC METABOLIC PNL TOTAL CA: CPT

## 2019-02-26 PROCEDURE — 97535 SELF CARE MNGMENT TRAINING: CPT

## 2019-02-26 PROCEDURE — 85730 THROMBOPLASTIN TIME PARTIAL: CPT

## 2019-02-26 PROCEDURE — 76000 FLUOROSCOPY <1 HR PHYS/QHP: CPT

## 2019-02-26 PROCEDURE — 86850 RBC ANTIBODY SCREEN: CPT

## 2019-02-27 ENCOUNTER — APPOINTMENT (OUTPATIENT)
Dept: ORTHOPEDIC SURGERY | Facility: CLINIC | Age: 74
End: 2019-02-27

## 2019-03-11 ENCOUNTER — APPOINTMENT (OUTPATIENT)
Dept: VASCULAR SURGERY | Facility: CLINIC | Age: 74
End: 2019-03-11
Payer: MEDICARE

## 2019-03-11 PROCEDURE — 99024 POSTOP FOLLOW-UP VISIT: CPT

## 2019-03-16 NOTE — ADDENDUM
[FreeTextEntry1] : Documented by Pablo Owusu acting as a scribe for Dr. David Corcoran on 03/11/2019

## 2019-03-16 NOTE — END OF VISIT
[FreeTextEntry3] : All medical record entries made by the Scribe were at my, Dr. Corcoran's direction and personally dictated by me on 03/11/2019 . I have reviewed the chart and agree that the record accurately reflects my personal performance of the history, physical exam, assessment and plan. I have also personally directed, reviewed, and agreed with the chart.

## 2019-03-16 NOTE — PHYSICAL EXAM
[Respiratory Effort] : normal respiratory effort [No Rash or Lesion] : No rash or lesion [Alert] : alert [Calm] : calm [JVD] : no jugular venous distention  [de-identified] : Well appearing, NAD [de-identified] : NCAT [FreeTextEntry1] : groin incisions healing well, no signs of infection. patient wearing spinal brace [de-identified] : F

## 2019-03-16 NOTE — HISTORY OF PRESENT ILLNESS
[FreeTextEntry1] : 72 y/o M s/p spinal reoperation with anterior exposure of the spine. He is doing well, back pain improved and able to walk and stand up straight. Wearing spinal brace. Incisions are healing well, no signs of infection.

## 2019-03-16 NOTE — ASSESSMENT
[FreeTextEntry1] : 72 y/o M s/p spine surgery with anterior approach. Doing well. incisions well healing, no signs of infection. C/w follow-up with Dr. Arredondo. Follow-up here as needed.

## 2019-03-18 ENCOUNTER — FORM ENCOUNTER (OUTPATIENT)
Age: 74
End: 2019-03-18

## 2019-03-19 ENCOUNTER — OUTPATIENT (OUTPATIENT)
Dept: OUTPATIENT SERVICES | Facility: HOSPITAL | Age: 74
LOS: 1 days | End: 2019-03-19
Payer: MEDICARE

## 2019-03-19 ENCOUNTER — APPOINTMENT (OUTPATIENT)
Dept: ORTHOPEDIC SURGERY | Facility: CLINIC | Age: 74
End: 2019-03-19
Payer: MEDICARE

## 2019-03-19 DIAGNOSIS — Z98.890 OTHER SPECIFIED POSTPROCEDURAL STATES: Chronic | ICD-10-CM

## 2019-03-19 DIAGNOSIS — Z96.60 PRESENCE OF UNSPECIFIED ORTHOPEDIC JOINT IMPLANT: Chronic | ICD-10-CM

## 2019-03-19 DIAGNOSIS — Z96.653 PRESENCE OF ARTIFICIAL KNEE JOINT, BILATERAL: Chronic | ICD-10-CM

## 2019-03-19 PROCEDURE — 72082 X-RAY EXAM ENTIRE SPI 2/3 VW: CPT | Mod: 26

## 2019-03-19 PROCEDURE — 99024 POSTOP FOLLOW-UP VISIT: CPT

## 2019-03-19 PROCEDURE — 72082 X-RAY EXAM ENTIRE SPI 2/3 VW: CPT

## 2019-03-19 NOTE — HISTORY OF PRESENT ILLNESS
[___ Weeks Post Op] : [unfilled] weeks post op [de-identified] : s/p ALIF L5/S1 and revsiion PSF with instrumentation T10-Pelvis for pseudoarthrosis and hardware failure.  Patient went to acute rehab.  now at home.  feels well.  feels significant difference in back pain from preop to post op.  significant difference in posture. [de-identified] : incision healing well\par 5/5 L2-S1 b/l\par SILT L2-S1 b/l\par wwp [de-identified] : 3/19/19 XR: T10-Pelvis Instrumentation and L5/S1 ALIF, hardware intact, alignment unchanged from post op film.  alignment much improved from preop. [de-identified] : 6 weeks post op revision doing well [de-identified] : continue TLSO and cane\par follow up in 6-8 weeks for new XR full spine\par all questions answered.

## 2019-04-15 ENCOUNTER — APPOINTMENT (OUTPATIENT)
Dept: CARDIOLOGY | Facility: CLINIC | Age: 74
End: 2019-04-15
Payer: MEDICARE

## 2019-04-15 ENCOUNTER — NON-APPOINTMENT (OUTPATIENT)
Age: 74
End: 2019-04-15

## 2019-04-15 VITALS
BODY MASS INDEX: 38.76 KG/M2 | TEMPERATURE: 98.7 F | OXYGEN SATURATION: 98 % | SYSTOLIC BLOOD PRESSURE: 159 MMHG | HEIGHT: 74 IN | HEART RATE: 92 BPM | DIASTOLIC BLOOD PRESSURE: 90 MMHG | WEIGHT: 302 LBS

## 2019-04-15 PROCEDURE — 99214 OFFICE O/P EST MOD 30 MIN: CPT

## 2019-04-15 PROCEDURE — 93000 ELECTROCARDIOGRAM COMPLETE: CPT

## 2019-04-22 LAB
BASOPHILS # BLD AUTO: 0.04 K/UL
BASOPHILS NFR BLD AUTO: 0.5 %
EOSINOPHIL # BLD AUTO: 0.24 K/UL
EOSINOPHIL NFR BLD AUTO: 3.2 %
HCT VFR BLD CALC: 39.5 %
HGB BLD-MCNC: 11.6 G/DL
IMM GRANULOCYTES NFR BLD AUTO: 0.5 %
LYMPHOCYTES # BLD AUTO: 1.5 K/UL
LYMPHOCYTES NFR BLD AUTO: 19.8 %
MAN DIFF?: NORMAL
MCHC RBC-ENTMCNC: 27.4 PG
MCHC RBC-ENTMCNC: 29.4 GM/DL
MCV RBC AUTO: 93.4 FL
MONOCYTES # BLD AUTO: 0.74 K/UL
MONOCYTES NFR BLD AUTO: 9.8 %
NEUTROPHILS # BLD AUTO: 5.01 K/UL
NEUTROPHILS NFR BLD AUTO: 66.2 %
PLATELET # BLD AUTO: 265 K/UL
RBC # BLD: 4.23 M/UL
RBC # FLD: 15 %
WBC # FLD AUTO: 7.57 K/UL

## 2019-04-23 LAB
25(OH)D3 SERPL-MCNC: 19.1 NG/ML
ALBUMIN SERPL ELPH-MCNC: 4.2 G/DL
ALP BLD-CCNC: 81 U/L
ALT SERPL-CCNC: 13 U/L
ANION GAP SERPL CALC-SCNC: 13 MMOL/L
APPEARANCE: CLEAR
AST SERPL-CCNC: 12 U/L
BACTERIA: NEGATIVE
BILIRUB DIRECT SERPL-MCNC: 0.1 MG/DL
BILIRUB INDIRECT SERPL-MCNC: 0.1 MG/DL
BILIRUB SERPL-MCNC: 0.2 MG/DL
BILIRUBIN URINE: NEGATIVE
BLOOD URINE: NEGATIVE
BUN SERPL-MCNC: 34 MG/DL
CALCIUM SERPL-MCNC: 9.2 MG/DL
CHLORIDE SERPL-SCNC: 109 MMOL/L
CHOLEST SERPL-MCNC: 146 MG/DL
CHOLEST/HDLC SERPL: 2.7 RATIO
CK SERPL-CCNC: 49 U/L
CO2 SERPL-SCNC: 22 MMOL/L
COLOR: NORMAL
CREAT SERPL-MCNC: 1.36 MG/DL
ESTIMATED AVERAGE GLUCOSE: 114 MG/DL
GLUCOSE QUALITATIVE U: NEGATIVE
GLUCOSE SERPL-MCNC: 122 MG/DL
HBA1C MFR BLD HPLC: 5.6 %
HDLC SERPL-MCNC: 54 MG/DL
HYALINE CASTS: 1 /LPF
KETONES URINE: NEGATIVE
LDLC SERPL CALC-MCNC: 68 MG/DL
LEUKOCYTE ESTERASE URINE: NEGATIVE
MICROSCOPIC-UA: NORMAL
NITRITE URINE: NEGATIVE
PH URINE: 6
POTASSIUM SERPL-SCNC: 5.7 MMOL/L
PROT SERPL-MCNC: 6.5 G/DL
PROTEIN URINE: NEGATIVE
PSA FREE FLD-MCNC: 37 %
PSA FREE SERPL-MCNC: 0.56 NG/ML
PSA SERPL-MCNC: 1.53 NG/ML
RED BLOOD CELLS URINE: 4 /HPF
SODIUM SERPL-SCNC: 144 MMOL/L
SPECIFIC GRAVITY URINE: 1.02
SQUAMOUS EPITHELIAL CELLS: 6 /HPF
T4 FREE SERPL-MCNC: 1 NG/DL
TRIGL SERPL-MCNC: 121 MG/DL
TSH SERPL-ACNC: 3.1 UIU/ML
UROBILINOGEN URINE: NORMAL
WHITE BLOOD CELLS URINE: 4 /HPF

## 2019-04-27 LAB
ANION GAP SERPL CALC-SCNC: 13 MMOL/L
BUN SERPL-MCNC: 30 MG/DL
CALCIUM SERPL-MCNC: 9.1 MG/DL
CHLORIDE SERPL-SCNC: 107 MMOL/L
CO2 SERPL-SCNC: 23 MMOL/L
CREAT SERPL-MCNC: 1.18 MG/DL
FERRITIN SERPL-MCNC: 27 NG/ML
FOLATE SERPL-MCNC: 13 NG/ML
GLUCOSE SERPL-MCNC: 95 MG/DL
IRON SATN MFR SERPL: 9 %
IRON SERPL-MCNC: 36 UG/DL
POTASSIUM SERPL-SCNC: 4.7 MMOL/L
SODIUM SERPL-SCNC: 143 MMOL/L
TIBC SERPL-MCNC: 390 UG/DL
UIBC SERPL-MCNC: 354 UG/DL
VIT B12 SERPL-MCNC: 354 PG/ML

## 2019-04-28 NOTE — HISTORY OF PRESENT ILLNESS
[FreeTextEntry1] : This is a 73 year old  gentlemen who presents today after complete back reconstruction and to establish care in the new office. He says he is not feeling himself, and wish he was feeling better. Patient is having difficulty getting up and is having a hard time walking. He states he is not in pain, however he is uncomfortable. He notices his ankles are swollen since he left rehab 1.5 months ago. The patient states that he is having difficulty breathing while walking, he states his therapist told him that he doesn't breath right.  Patient denies  palpitations, chest pain, nausea, vomiting, dizziness and lightheadedness.\par

## 2019-04-28 NOTE — PHYSICAL EXAM
[General Appearance - Well Developed] : well developed [General Appearance - Well Nourished] : well nourished [Well Groomed] : well groomed [Normal Appearance] : normal appearance [No Deformities] : no deformities [General Appearance - In No Acute Distress] : no acute distress [Normal Conjunctiva] : the conjunctiva exhibited no abnormalities [Eyelids - No Xanthelasma] : the eyelids demonstrated no xanthelasmas [Normal Oral Mucosa] : normal oral mucosa [No Oral Pallor] : no oral pallor [No Oral Cyanosis] : no oral cyanosis [Normal Jugular Venous V Waves Present] : normal jugular venous V waves present [No Jugular Venous Amado A Waves] : no jugular venous amado A waves [Normal Jugular Venous A Waves Present] : normal jugular venous A waves present [Respiration, Rhythm And Depth] : normal respiratory rhythm and effort [Exaggerated Use Of Accessory Muscles For Inspiration] : no accessory muscle use [Auscultation Breath Sounds / Voice Sounds] : lungs were clear to auscultation bilaterally [Heart Rate And Rhythm] : heart rate and rhythm were normal [Heart Sounds] : normal S1 and S2 [Murmurs] : no murmurs present [Abdomen Tenderness] : non-tender [Abdomen Soft] : soft [Abdomen Mass (___ Cm)] : no abdominal mass palpated [Abnormal Walk] : normal gait [Gait - Sufficient For Exercise Testing] : the gait was sufficient for exercise testing [Nail Clubbing] : no clubbing of the fingernails [Cyanosis, Localized] : no localized cyanosis [Petechial Hemorrhages (___cm)] : no petechial hemorrhages [Skin Color & Pigmentation] : normal skin color and pigmentation [] : no rash [No Venous Stasis] : no venous stasis [Skin Lesions] : no skin lesions [No Skin Ulcers] : no skin ulcer [No Xanthoma] : no  xanthoma was observed [Oriented To Time, Place, And Person] : oriented to person, place, and time [Affect] : the affect was normal [Mood] : the mood was normal [No Anxiety] : not feeling anxious [FreeTextEntry1] : bilateral leg edema +2

## 2019-05-13 DIAGNOSIS — E61.1 IRON DEFICIENCY: ICD-10-CM

## 2019-06-04 ENCOUNTER — APPOINTMENT (OUTPATIENT)
Dept: CARDIOLOGY | Facility: CLINIC | Age: 74
End: 2019-06-04
Payer: MEDICARE

## 2019-06-04 VITALS
SYSTOLIC BLOOD PRESSURE: 120 MMHG | WEIGHT: 294 LBS | BODY MASS INDEX: 37.73 KG/M2 | HEART RATE: 93 BPM | OXYGEN SATURATION: 96 % | HEIGHT: 74 IN | DIASTOLIC BLOOD PRESSURE: 80 MMHG | TEMPERATURE: 98.1 F

## 2019-06-04 PROCEDURE — 99213 OFFICE O/P EST LOW 20 MIN: CPT

## 2019-06-07 ENCOUNTER — APPOINTMENT (OUTPATIENT)
Dept: UROLOGY | Facility: CLINIC | Age: 74
End: 2019-06-07
Payer: MEDICARE

## 2019-06-07 VITALS
DIASTOLIC BLOOD PRESSURE: 55 MMHG | SYSTOLIC BLOOD PRESSURE: 104 MMHG | TEMPERATURE: 98.1 F | HEART RATE: 105 BPM | RESPIRATION RATE: 18 BRPM

## 2019-06-07 DIAGNOSIS — R31.29 OTHER MICROSCOPIC HEMATURIA: ICD-10-CM

## 2019-06-07 PROCEDURE — 99204 OFFICE O/P NEW MOD 45 MIN: CPT

## 2019-06-07 NOTE — REVIEW OF SYSTEMS
[Negative] : Heme/Lymph [Strong urge to urinate] : strong urge to urinate [Shortness Of Breath] : shortness of breath [Slow urine stream] : slow urine stream [Joint Pain] : joint pain

## 2019-06-09 ENCOUNTER — RESULT REVIEW (OUTPATIENT)
Age: 74
End: 2019-06-09

## 2019-06-09 LAB — TRANSFERRIN SERPL-MCNC: 269 MG/DL

## 2019-06-10 ENCOUNTER — FORM ENCOUNTER (OUTPATIENT)
Age: 74
End: 2019-06-10

## 2019-06-11 ENCOUNTER — APPOINTMENT (OUTPATIENT)
Dept: ORTHOPEDIC SURGERY | Facility: CLINIC | Age: 74
End: 2019-06-11
Payer: MEDICARE

## 2019-06-11 ENCOUNTER — OUTPATIENT (OUTPATIENT)
Dept: OUTPATIENT SERVICES | Facility: HOSPITAL | Age: 74
LOS: 1 days | End: 2019-06-11
Payer: MEDICARE

## 2019-06-11 DIAGNOSIS — Z98.890 OTHER SPECIFIED POSTPROCEDURAL STATES: Chronic | ICD-10-CM

## 2019-06-11 DIAGNOSIS — Z96.653 PRESENCE OF ARTIFICIAL KNEE JOINT, BILATERAL: Chronic | ICD-10-CM

## 2019-06-11 DIAGNOSIS — Z98.1 ARTHRODESIS STATUS: ICD-10-CM

## 2019-06-11 DIAGNOSIS — Z96.60 PRESENCE OF UNSPECIFIED ORTHOPEDIC JOINT IMPLANT: Chronic | ICD-10-CM

## 2019-06-11 LAB
ANION GAP SERPL CALC-SCNC: 12 MMOL/L
APPEARANCE: CLEAR
BACTERIA UR CULT: NORMAL
BACTERIA: NEGATIVE
BASOPHILS # BLD AUTO: 0.03 K/UL
BASOPHILS NFR BLD AUTO: 0.5 %
BILIRUBIN URINE: NEGATIVE
BLOOD URINE: NEGATIVE
BUN SERPL-MCNC: 23 MG/DL
CALCIUM SERPL-MCNC: 9.6 MG/DL
CHLORIDE SERPL-SCNC: 107 MMOL/L
CO2 SERPL-SCNC: 24 MMOL/L
COLOR: NORMAL
CREAT SERPL-MCNC: 1.25 MG/DL
EOSINOPHIL # BLD AUTO: 0.24 K/UL
EOSINOPHIL NFR BLD AUTO: 3.8 %
FERRITIN SERPL-MCNC: 46 NG/ML
FOLATE SERPL-MCNC: 11.1 NG/ML
GLUCOSE QUALITATIVE U: NEGATIVE
GLUCOSE SERPL-MCNC: 112 MG/DL
HCT VFR BLD CALC: 42.7 %
HGB BLD-MCNC: 13.2 G/DL
HYALINE CASTS: 3 /LPF
IMM GRANULOCYTES NFR BLD AUTO: 0.2 %
IRON SERPL-MCNC: 232 UG/DL
KETONES URINE: NEGATIVE
LEUKOCYTE ESTERASE URINE: NEGATIVE
LYMPHOCYTES # BLD AUTO: 1.41 K/UL
LYMPHOCYTES NFR BLD AUTO: 22.6 %
MAN DIFF?: NORMAL
MCHC RBC-ENTMCNC: 27.4 PG
MCHC RBC-ENTMCNC: 30.9 GM/DL
MCV RBC AUTO: 88.6 FL
MICROSCOPIC-UA: NORMAL
MONOCYTES # BLD AUTO: 0.73 K/UL
MONOCYTES NFR BLD AUTO: 11.7 %
NEUTROPHILS # BLD AUTO: 3.83 K/UL
NEUTROPHILS NFR BLD AUTO: 61.2 %
NITRITE URINE: NEGATIVE
PH URINE: 6
PLATELET # BLD AUTO: 238 K/UL
POTASSIUM SERPL-SCNC: 5.4 MMOL/L
PROTEIN URINE: NEGATIVE
RBC # BLD: 4.82 M/UL
RBC # FLD: 18.1 %
RED BLOOD CELLS URINE: 10 /HPF
SODIUM SERPL-SCNC: 143 MMOL/L
SPECIFIC GRAVITY URINE: 1.02
SQUAMOUS EPITHELIAL CELLS: 4 /HPF
UROBILINOGEN URINE: NORMAL
VIT B12 SERPL-MCNC: 351 PG/ML
WBC # FLD AUTO: 6.25 K/UL
WHITE BLOOD CELLS URINE: 2 /HPF

## 2019-06-11 PROCEDURE — 72100 X-RAY EXAM L-S SPINE 2/3 VWS: CPT

## 2019-06-11 PROCEDURE — 72083 X-RAY EXAM ENTIRE SPI 4/5 VW: CPT

## 2019-06-11 PROCEDURE — 72082 X-RAY EXAM ENTIRE SPI 2/3 VW: CPT

## 2019-06-11 PROCEDURE — 99213 OFFICE O/P EST LOW 20 MIN: CPT

## 2019-06-11 PROCEDURE — 72082 X-RAY EXAM ENTIRE SPI 2/3 VW: CPT | Mod: 26

## 2019-06-12 DIAGNOSIS — L23.7 ALLERGIC CONTACT DERMATITIS DUE TO PLANTS, EXCEPT FOOD: ICD-10-CM

## 2019-06-15 LAB
ANION GAP SERPL CALC-SCNC: 12 MMOL/L
BUN SERPL-MCNC: 33 MG/DL
CALCIUM SERPL-MCNC: 9.6 MG/DL
CHLORIDE SERPL-SCNC: 103 MMOL/L
CO2 SERPL-SCNC: 25 MMOL/L
CREAT SERPL-MCNC: 1.5 MG/DL
GLUCOSE SERPL-MCNC: 103 MG/DL
POTASSIUM SERPL-SCNC: 4.4 MMOL/L
SODIUM SERPL-SCNC: 140 MMOL/L

## 2019-06-16 NOTE — PHYSICAL EXAM
[No Acute Distress] : no acute distress [Well Developed] : well developed [Well Nourished] : well nourished [Well-Appearing] : well-appearing [EOMI] : extraocular movements intact [Normal Sclera/Conjunctiva] : normal sclera/conjunctiva [PERRL] : pupils equal round and reactive to light [Normal Outer Ear/Nose] : the outer ears and nose were normal in appearance [Normal Oropharynx] : the oropharynx was normal [Supple] : supple [No JVD] : no jugular venous distention [No Respiratory Distress] : no respiratory distress  [Thyroid Normal, No Nodules] : the thyroid was normal and there were no nodules present [No Lymphadenopathy] : no lymphadenopathy [No Accessory Muscle Use] : no accessory muscle use [Clear to Auscultation] : lungs were clear to auscultation bilaterally [Regular Rhythm] : with a regular rhythm [Normal Rate] : normal rate  [No Carotid Bruits] : no carotid bruits [Normal S1, S2] : normal S1 and S2 [No Murmur] : no murmur heard [No Abdominal Bruit] : a ~M bruit was not heard ~T in the abdomen [No Edema] : there was no peripheral edema [Pedal Pulses Present] : the pedal pulses are present [No Varicosities] : no varicosities [No Palpable Aorta] : no palpable aorta [No Extremity Clubbing/Cyanosis] : no extremity clubbing/cyanosis [Soft] : abdomen soft [Non Tender] : non-tender [Non-distended] : non-distended [No Masses] : no abdominal mass palpated [Normal Posterior Cervical Nodes] : no posterior cervical lymphadenopathy [No HSM] : no HSM [Normal Bowel Sounds] : normal bowel sounds [No CVA Tenderness] : no CVA  tenderness [Normal Anterior Cervical Nodes] : no anterior cervical lymphadenopathy [No Joint Swelling] : no joint swelling [No Spinal Tenderness] : no spinal tenderness [Normal Gait] : normal gait [Grossly Normal Strength/Tone] : grossly normal strength/tone [No Rash] : no rash [No Focal Deficits] : no focal deficits [Coordination Grossly Intact] : coordination grossly intact [Deep Tendon Reflexes (DTR)] : deep tendon reflexes were 2+ and symmetric [Normal Affect] : the affect was normal [Normal Insight/Judgement] : insight and judgment were intact [de-identified] : Anterior abdominal Scar intake- left side [de-identified] : Back Scar intake [de-identified] : Gait unsteady, patient with cane

## 2019-06-16 NOTE — HISTORY OF PRESENT ILLNESS
[de-identified] : This is a 73 year old gentlemen following up today after his recent visit on April 15, 2019. Patient states that he ran out of [employer] and would like to have it renewed. Patient was started on Ferrous Sulfate 325 mg daily and was told to follow up in 3 weeks for repeat blood work. Patient sates that he was also told that he needs repeat UA and UC. Patient still complaining of back pain after his surgery and states that he will see surgeon next week. Patient denies dyspnea, palpitations, chest pain, nausea, vomiting, dizziness and lightheadedness.\par

## 2019-06-17 DIAGNOSIS — Z98.1 ARTHRODESIS STATUS: ICD-10-CM

## 2019-06-17 DIAGNOSIS — M54.5 LOW BACK PAIN: ICD-10-CM

## 2019-06-18 NOTE — PHYSICAL EXAM
[General Appearance - Well Developed] : well developed [General Appearance - Well Nourished] : well nourished [Normal Appearance] : normal appearance [Well Groomed] : well groomed [General Appearance - In No Acute Distress] : no acute distress [Edema] : no peripheral edema [Respiration, Rhythm And Depth] : normal respiratory rhythm and effort [Exaggerated Use Of Accessory Muscles For Inspiration] : no accessory muscle use [Abdomen Soft] : soft [Abdomen Tenderness] : non-tender [Costovertebral Angle Tenderness] : no ~M costovertebral angle tenderness [Prostate Tenderness] : the prostate was not tender [No Prostate Nodules] : no prostate nodules [Prostate Size ___ gm] : prostate size [unfilled] gm [Normal Station and Gait] : the gait and station were normal for the patient's age [] : no rash [No Focal Deficits] : no focal deficits [Oriented To Time, Place, And Person] : oriented to person, place, and time

## 2019-06-18 NOTE — HISTORY OF PRESENT ILLNESS
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 Denies any obstructive voiding symptoms. He does not wears pads. He is naive to tx in the past. He c/o erectile dysfunction. Also naive to any ED treatments. \par \par PVR - 0 ml. \par

## 2019-06-18 NOTE — ASSESSMENT
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 \par \par 1. UA with micro, urine c/s. \par 2. Options for management of the patient's overactive bladder and incontinence discussed at length. These included medical therapy, behavioral modification, bladder retraining, and surgical options. Surgical options for third line therapies reviewed included injection of botulinum toxin versus sacral nerve stimulation therapy. The patient would like to start with behavioral modification, bladder retraining and medical therapy. Will try an anticholinergic, se discussed.\par

## 2019-07-07 PROBLEM — Z98.1 S/P LUMBAR FUSION: Status: ACTIVE | Noted: 2018-11-13

## 2019-07-07 PROBLEM — Z98.1 S/P FUSION OF THORACIC SPINE: Status: ACTIVE | Noted: 2018-11-13

## 2019-07-07 NOTE — HISTORY OF PRESENT ILLNESS
[___ Months Post Op] : [unfilled] months post op [de-identified] : s/p ALIF L5/S1 and revsiion PSF with instrumentation T10-Pelvis for pseudoarthrosis and hardware failure.  Patient went to acute rehab.  now at home.  feels well.  feels significant difference in back pain from preop to post op.  significant difference in posture [de-identified] : incision healing well\par 5/5 L2-S1 b/l\par SILT L2-S1 b/l\par wwp\par 15 degree hip flexion contractures [de-identified] : 3/19/19 XR: T10-Pelvis Instrumentation and L5/S1 ALIF, hardware intact, alignment unchanged from post op film.  alignment much improved from preop. [de-identified] : 4.5 months post op revision doing well [de-identified] : continue TLSO until 6 months post op\par work on hyperextension hips/pelvis for compensation\par follow up in 3 months for new XR full spine\par all questions answered.

## 2019-07-07 NOTE — HISTORY OF PRESENT ILLNESS
[___ Months Post Op] : [unfilled] months post op [de-identified] : s/p ALIF L5/S1 and revsiion PSF with instrumentation T10-Pelvis for pseudoarthrosis and hardware failure.  Patient went to acute rehab.  now at home.  feels well.  feels significant difference in back pain from preop to post op.  significant difference in posture [de-identified] : incision healing well\par 5/5 L2-S1 b/l\par SILT L2-S1 b/l\par wwp\par 15 degree hip flexion contractures [de-identified] : 3/19/19 XR: T10-Pelvis Instrumentation and L5/S1 ALIF, hardware intact, alignment unchanged from post op film.  alignment much improved from preop. [de-identified] : 4.5 months post op revision doing well [de-identified] : continue TLSO until 6 months post op\par work on hyperextension hips/pelvis for compensation\par follow up in 3 months for new XR full spine\par all questions answered.

## 2019-07-22 LAB
ANION GAP SERPL CALC-SCNC: 17 MMOL/L
BASOPHILS # BLD AUTO: 0.04 K/UL
BASOPHILS NFR BLD AUTO: 0.5 %
BUN SERPL-MCNC: 35 MG/DL
CALCIUM SERPL-MCNC: 9.4 MG/DL
CHLORIDE SERPL-SCNC: 106 MMOL/L
CO2 SERPL-SCNC: 21 MMOL/L
CREAT SERPL-MCNC: 1.33 MG/DL
EOSINOPHIL # BLD AUTO: 0.31 K/UL
EOSINOPHIL NFR BLD AUTO: 3.9 %
FERRITIN SERPL-MCNC: 53 NG/ML
FOLATE SERPL-MCNC: 15.1 NG/ML
GLUCOSE SERPL-MCNC: 99 MG/DL
HCT VFR BLD CALC: 44 %
HGB BLD-MCNC: 13.8 G/DL
IMM GRANULOCYTES NFR BLD AUTO: 0.3 %
IRON SERPL-MCNC: 102 UG/DL
LYMPHOCYTES # BLD AUTO: 1.71 K/UL
LYMPHOCYTES NFR BLD AUTO: 21.6 %
MAN DIFF?: NORMAL
MCHC RBC-ENTMCNC: 28.4 PG
MCHC RBC-ENTMCNC: 31.4 GM/DL
MCV RBC AUTO: 90.5 FL
MONOCYTES # BLD AUTO: 0.79 K/UL
MONOCYTES NFR BLD AUTO: 10 %
NEUTROPHILS # BLD AUTO: 5.05 K/UL
NEUTROPHILS NFR BLD AUTO: 63.7 %
PLATELET # BLD AUTO: 228 K/UL
POTASSIUM SERPL-SCNC: 4.3 MMOL/L
RBC # BLD: 4.86 M/UL
RBC # FLD: 18.6 %
SODIUM SERPL-SCNC: 144 MMOL/L
TRANSFERRIN SERPL-MCNC: 266 MG/DL
VIT B12 SERPL-MCNC: 358 PG/ML
WBC # FLD AUTO: 7.92 K/UL

## 2019-07-23 ENCOUNTER — APPOINTMENT (OUTPATIENT)
Dept: NEPHROLOGY | Facility: CLINIC | Age: 74
End: 2019-07-23
Payer: MEDICARE

## 2019-07-23 ENCOUNTER — APPOINTMENT (OUTPATIENT)
Dept: UROLOGY | Facility: CLINIC | Age: 74
End: 2019-07-23
Payer: MEDICARE

## 2019-07-23 VITALS
WEIGHT: 288 LBS | TEMPERATURE: 98 F | HEART RATE: 93 BPM | BODY MASS INDEX: 36.96 KG/M2 | HEIGHT: 74 IN | DIASTOLIC BLOOD PRESSURE: 74 MMHG | RESPIRATION RATE: 16 BRPM | SYSTOLIC BLOOD PRESSURE: 130 MMHG

## 2019-07-23 DIAGNOSIS — R35.1 NOCTURIA: ICD-10-CM

## 2019-07-23 PROCEDURE — 51798 US URINE CAPACITY MEASURE: CPT

## 2019-07-23 PROCEDURE — 99214 OFFICE O/P EST MOD 30 MIN: CPT | Mod: 25

## 2019-07-23 PROCEDURE — 99205 OFFICE O/P NEW HI 60 MIN: CPT

## 2019-07-23 RX ORDER — CAMPHOR 0.45 %
25 GEL (GRAM) TOPICAL
Refills: 0 | Status: DISCONTINUED | COMMUNITY
Start: 2019-06-12 | End: 2019-07-23

## 2019-07-23 RX ORDER — METHYLPREDNISOLONE 4 MG/1
4 TABLET ORAL
Qty: 1 | Refills: 0 | Status: COMPLETED | COMMUNITY
Start: 2019-06-12 | End: 2019-07-23

## 2019-07-23 RX ORDER — FERROUS SULFATE 325(65) MG
325 (65 FE) TABLET ORAL DAILY
Qty: 30 | Refills: 0 | Status: COMPLETED | COMMUNITY
Start: 2019-05-13 | End: 2019-07-23

## 2019-07-23 RX ORDER — FEXOFENADINE HYDROCHLORIDE 180 MG/1
180 TABLET ORAL DAILY
Qty: 30 | Refills: 0 | Status: DISCONTINUED | COMMUNITY
Start: 2019-06-12 | End: 2019-07-23

## 2019-07-23 NOTE — PHYSICAL EXAM
[General Appearance - Alert] : alert [General Appearance - In No Acute Distress] : in no acute distress [Sclera] : the sclera and conjunctiva were normal [PERRL With Normal Accommodation] : pupils were equal in size, round, and reactive to light [Extraocular Movements] : extraocular movements were intact [Outer Ear] : the ears and nose were normal in appearance [Oropharynx] : the oropharynx was normal [Neck Appearance] : the appearance of the neck was normal [Neck Cervical Mass (___cm)] : no neck mass was observed [Jugular Venous Distention Increased] : there was no jugular-venous distention [Thyroid Diffuse Enlargement] : the thyroid was not enlarged [Thyroid Nodule] : there were no palpable thyroid nodules [Auscultation Breath Sounds / Voice Sounds] : lungs were clear to auscultation bilaterally [Heart Rate And Rhythm] : heart rate was normal and rhythm regular [Heart Sounds] : normal S1 and S2 [Heart Sounds Gallop] : no gallops [Murmurs] : no murmurs [Heart Sounds Pericardial Friction Rub] : no pericardial rub [Full Pulse] : the pedal pulses are present [FreeTextEntry1] : B pitting ankle edema [Bowel Sounds] : normal bowel sounds [Abdomen Soft] : soft [Abdomen Tenderness] : non-tender [Abdomen Mass (___ Cm)] : no abdominal mass palpated [Cervical Lymph Nodes Enlarged Posterior Bilaterally] : posterior cervical [Cervical Lymph Nodes Enlarged Anterior Bilaterally] : anterior cervical [Supraclavicular Lymph Nodes Enlarged Bilaterally] : supraclavicular [No CVA Tenderness] : no ~M costovertebral angle tenderness [No Spinal Tenderness] : no spinal tenderness [Abnormal Walk] : normal gait [Nail Clubbing] : no clubbing  or cyanosis of the fingernails [Musculoskeletal - Swelling] : no joint swelling seen [Motor Tone] : muscle strength and tone were normal [Skin Color & Pigmentation] : normal skin color and pigmentation [Skin Turgor] : normal skin turgor [] : no rash [Deep Tendon Reflexes (DTR)] : deep tendon reflexes were 2+ and symmetric [Sensation] : the sensory exam was normal to light touch and pinprick [Oriented To Time, Place, And Person] : oriented to person, place, and time [No Focal Deficits] : no focal deficits [Affect] : the affect was normal [Impaired Insight] : insight and judgment were intact

## 2019-07-23 NOTE — HISTORY OF PRESENT ILLNESS
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 Voids q 2-3 h day time and nocturia X 2, with mild UUI mild.  Denies any obstructive voiding symptoms. He c/o erectile dysfunction. Also naive to any ED treatment.Noted elevated creatinine 1.33 from last week. We have a nephrologist in office today and he was referred to Dr Nicolle Braxton for same. He will continue on Oxybutynin ER 10  mg and does not want a increase in dosing. He will make fluids changes- cut down on Iced tea and coffee and increase water intake day time . He drinks 1 gallon at night- states he is not a diabetic . Iced teax 32  oz and 1 gallon of water at night and equivalent day time. \par Prostate at last exam was 25 gm.

## 2019-07-23 NOTE — ASSESSMENT
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 Voids q 2-3 h day time and nocturia X 2, with mild UUI mild.  Denies any obstructive voiding symptoms. He c/o erectile dysfunction. Also naive to any ED treatment.Noted elevated creatinine 1.33 from last week. We have a nephrologist in office today and he was referred to Dr Nicolle Braxton for same. He will continue on Oxybutynin ER 10  mg and does not want a increase in dosing. He will make fluids changes- cut down on Iced tea and coffee and increase water intake day time . He drinks 1 gallon at night- states he is not a diabetic . Iced tea x 32  oz and 1 gallon of water at night and equivalent day time. Prostate at last exam was 25 gm.\par 1.Life style behavior - fluids management- increase water intake and cut down on iced tea and refrain from drinking at night.\par 2.Continue Oxybutynin ER 10  mg po daily .\par 3.Refer to nephrology - seeing Dr Braxton today.\par 4.RTO in 4 months.\par

## 2019-07-23 NOTE — PHYSICAL EXAM
[General Appearance - Alert] : alert [General Appearance - In No Acute Distress] : in no acute distress [Sclera] : the sclera and conjunctiva were normal [PERRL With Normal Accommodation] : pupils were equal in size, round, and reactive to light [Extraocular Movements] : extraocular movements were intact [Outer Ear] : the ears and nose were normal in appearance [Oropharynx] : the oropharynx was normal [Neck Appearance] : the appearance of the neck was normal [Neck Cervical Mass (___cm)] : no neck mass was observed [Jugular Venous Distention Increased] : there was no jugular-venous distention [Thyroid Diffuse Enlargement] : the thyroid was not enlarged [Thyroid Nodule] : there were no palpable thyroid nodules [Auscultation Breath Sounds / Voice Sounds] : lungs were clear to auscultation bilaterally [Heart Rate And Rhythm] : heart rate was normal and rhythm regular [Heart Sounds] : normal S1 and S2 [Heart Sounds Gallop] : no gallops [Murmurs] : no murmurs [Heart Sounds Pericardial Friction Rub] : no pericardial rub [Full Pulse] : the pedal pulses are present [FreeTextEntry1] : B pitting ankle edema [Bowel Sounds] : normal bowel sounds [Abdomen Soft] : soft [Abdomen Tenderness] : non-tender [Abdomen Mass (___ Cm)] : no abdominal mass palpated [Cervical Lymph Nodes Enlarged Posterior Bilaterally] : posterior cervical [Cervical Lymph Nodes Enlarged Anterior Bilaterally] : anterior cervical [Supraclavicular Lymph Nodes Enlarged Bilaterally] : supraclavicular [No CVA Tenderness] : no ~M costovertebral angle tenderness [Abnormal Walk] : normal gait [No Spinal Tenderness] : no spinal tenderness [Nail Clubbing] : no clubbing  or cyanosis of the fingernails [Motor Tone] : muscle strength and tone were normal [Musculoskeletal - Swelling] : no joint swelling seen [] : no rash [Skin Color & Pigmentation] : normal skin color and pigmentation [Skin Turgor] : normal skin turgor [Deep Tendon Reflexes (DTR)] : deep tendon reflexes were 2+ and symmetric [Oriented To Time, Place, And Person] : oriented to person, place, and time [No Focal Deficits] : no focal deficits [Sensation] : the sensory exam was normal to light touch and pinprick [Affect] : the affect was normal [Impaired Insight] : insight and judgment were intact

## 2019-07-23 NOTE — REVIEW OF SYSTEMS
[see HPI] : see HPI [Incontinence] : incontinence [Nocturia] : nocturia [Difficulty Walking] : difficulty walking [Limb Weakness] : limb weakness [Erectile Dysfunction] : erectile dysfunction [Negative] : Heme/Lymph

## 2019-08-08 NOTE — PROGRESS NOTE ADULT - PSYCHIATRIC
Affect and characteristics of appearance, verbalizations, behaviors are appropriate negative 2 person assist/verbal cues

## 2019-08-12 ENCOUNTER — APPOINTMENT (OUTPATIENT)
Dept: CARDIOLOGY | Facility: CLINIC | Age: 74
End: 2019-08-12
Payer: MEDICARE

## 2019-08-12 VITALS
HEIGHT: 74 IN | WEIGHT: 293 LBS | TEMPERATURE: 98.1 F | HEART RATE: 85 BPM | BODY MASS INDEX: 37.6 KG/M2 | OXYGEN SATURATION: 96 % | SYSTOLIC BLOOD PRESSURE: 100 MMHG | DIASTOLIC BLOOD PRESSURE: 60 MMHG

## 2019-08-12 PROCEDURE — 99213 OFFICE O/P EST LOW 20 MIN: CPT

## 2019-08-19 LAB
ALBUMIN SERPL ELPH-MCNC: 4.8 G/DL
ALP BLD-CCNC: 69 U/L
ALT SERPL-CCNC: 16 U/L
ANION GAP SERPL CALC-SCNC: 14 MMOL/L
AST SERPL-CCNC: 12 U/L
BASOPHILS # BLD AUTO: 0.04 K/UL
BASOPHILS NFR BLD AUTO: 0.6 %
BILIRUB DIRECT SERPL-MCNC: 0.1 MG/DL
BILIRUB INDIRECT SERPL-MCNC: 0.2 MG/DL
BILIRUB SERPL-MCNC: 0.4 MG/DL
BUN SERPL-MCNC: 22 MG/DL
CALCIUM SERPL-MCNC: 9.6 MG/DL
CHLORIDE SERPL-SCNC: 103 MMOL/L
CHOLEST SERPL-MCNC: 183 MG/DL
CHOLEST/HDLC SERPL: 3.5 RATIO
CK SERPL-CCNC: 76 U/L
CO2 SERPL-SCNC: 27 MMOL/L
CREAT SERPL-MCNC: 1.28 MG/DL
EOSINOPHIL # BLD AUTO: 0.3 K/UL
EOSINOPHIL NFR BLD AUTO: 4.2 %
GLUCOSE SERPL-MCNC: 111 MG/DL
HCT VFR BLD CALC: 43.7 %
HDLC SERPL-MCNC: 53 MG/DL
HGB BLD-MCNC: 14 G/DL
IMM GRANULOCYTES NFR BLD AUTO: 0.3 %
LDLC SERPL CALC-MCNC: 105 MG/DL
LYMPHOCYTES # BLD AUTO: 1.45 K/UL
LYMPHOCYTES NFR BLD AUTO: 20.1 %
MAN DIFF?: NORMAL
MCHC RBC-ENTMCNC: 29.7 PG
MCHC RBC-ENTMCNC: 32 GM/DL
MCV RBC AUTO: 92.6 FL
MONOCYTES # BLD AUTO: 0.7 K/UL
MONOCYTES NFR BLD AUTO: 9.7 %
NEUTROPHILS # BLD AUTO: 4.71 K/UL
NEUTROPHILS NFR BLD AUTO: 65.1 %
PLATELET # BLD AUTO: 225 K/UL
POTASSIUM SERPL-SCNC: 4.9 MMOL/L
PROT SERPL-MCNC: 6.9 G/DL
RBC # BLD: 4.72 M/UL
RBC # FLD: 16.8 %
SODIUM SERPL-SCNC: 144 MMOL/L
TRIGL SERPL-MCNC: 126 MG/DL
URATE SERPL-MCNC: 8.5 MG/DL
WBC # FLD AUTO: 7.22 K/UL

## 2019-08-21 NOTE — HISTORY OF PRESENT ILLNESS
[FreeTextEntry1] : F/U Lower Extremity Edema  [de-identified] : This is a 74 year old gentlemen who presents today for followup of his lower extremity edema. Patient states that he has been takign his diuretic, and states that he is still having swelling. Patient denies dyspnea, palpitations, chest pain, nausea, vomiting, dizziness and lightheadedness.\par

## 2019-08-21 NOTE — PHYSICAL EXAM
[No Acute Distress] : no acute distress [Well Nourished] : well nourished [Well Developed] : well developed [Well-Appearing] : well-appearing [Normal Sclera/Conjunctiva] : normal sclera/conjunctiva [PERRL] : pupils equal round and reactive to light [EOMI] : extraocular movements intact [Normal Outer Ear/Nose] : the outer ears and nose were normal in appearance [Normal Oropharynx] : the oropharynx was normal [No JVD] : no jugular venous distention [No Lymphadenopathy] : no lymphadenopathy [Supple] : supple [Thyroid Normal, No Nodules] : the thyroid was normal and there were no nodules present [No Respiratory Distress] : no respiratory distress  [No Accessory Muscle Use] : no accessory muscle use [Clear to Auscultation] : lungs were clear to auscultation bilaterally [Normal Rate] : normal rate  [Regular Rhythm] : with a regular rhythm [Normal S1, S2] : normal S1 and S2 [No Murmur] : no murmur heard [No Carotid Bruits] : no carotid bruits [No Abdominal Bruit] : a ~M bruit was not heard ~T in the abdomen [No Varicosities] : no varicosities [Pedal Pulses Present] : the pedal pulses are present [No Palpable Aorta] : no palpable aorta [No Extremity Clubbing/Cyanosis] : no extremity clubbing/cyanosis [Soft] : abdomen soft [Non Tender] : non-tender [Non-distended] : non-distended [No Masses] : no abdominal mass palpated [No HSM] : no HSM [Normal Bowel Sounds] : normal bowel sounds [Normal Posterior Cervical Nodes] : no posterior cervical lymphadenopathy [Normal Anterior Cervical Nodes] : no anterior cervical lymphadenopathy [No CVA Tenderness] : no CVA  tenderness [No Spinal Tenderness] : no spinal tenderness [No Joint Swelling] : no joint swelling [Grossly Normal Strength/Tone] : grossly normal strength/tone [No Rash] : no rash [Coordination Grossly Intact] : coordination grossly intact [No Focal Deficits] : no focal deficits [Normal Gait] : normal gait [Deep Tendon Reflexes (DTR)] : deep tendon reflexes were 2+ and symmetric [Normal Affect] : the affect was normal [Normal Insight/Judgement] : insight and judgment were intact [de-identified] : +2 Peripheral Edema in the ankles

## 2019-09-15 LAB
ESTIMATED AVERAGE GLUCOSE: 128 MG/DL
HBA1C MFR BLD HPLC: 6.1 %

## 2019-09-17 ENCOUNTER — RESULT REVIEW (OUTPATIENT)
Age: 74
End: 2019-09-17

## 2019-11-08 ENCOUNTER — RX CHANGE (OUTPATIENT)
Age: 74
End: 2019-11-08

## 2019-12-03 ENCOUNTER — APPOINTMENT (OUTPATIENT)
Dept: UROLOGY | Facility: CLINIC | Age: 74
End: 2019-12-03
Payer: MEDICARE

## 2019-12-03 VITALS
SYSTOLIC BLOOD PRESSURE: 120 MMHG | TEMPERATURE: 98.1 F | HEART RATE: 88 BPM | RESPIRATION RATE: 16 BRPM | DIASTOLIC BLOOD PRESSURE: 76 MMHG

## 2019-12-03 DIAGNOSIS — N39.41 URGE INCONTINENCE: ICD-10-CM

## 2019-12-03 DIAGNOSIS — N32.81 OVERACTIVE BLADDER: ICD-10-CM

## 2019-12-03 DIAGNOSIS — N52.9 MALE ERECTILE DYSFUNCTION, UNSPECIFIED: ICD-10-CM

## 2019-12-03 PROCEDURE — 99214 OFFICE O/P EST MOD 30 MIN: CPT

## 2019-12-03 RX ORDER — OXYBUTYNIN CHLORIDE 10 MG/1
10 TABLET, EXTENDED RELEASE ORAL DAILY
Qty: 30 | Refills: 6 | Status: COMPLETED | COMMUNITY
Start: 2019-06-07 | End: 2019-12-03

## 2019-12-03 NOTE — ASSESSMENT
[FreeTextEntry1] : 75 yo gentleman PMH Lordosis s/p multiple back surgery, doing OK with Oxybutynin ER 10  MG- dry mouth. Voids q 1-2  h day time and nocturia X 2, no UI.  Denies any obstructive voiding symptoms. He c/o erectile dysfunction and also low libido , naive to any ED treatment. He will stop  Oxybutynin ER 10  mg  and do trial of Myrbetriq 25 mg po daily- side effects reviewed. He will make fluids changes- cut down on Iced tea and coffee and increase water intake day time . He drinks 1 gallon at night- states he is not a diabetic . Coffee  20 oz,occ  Iced tea x  oz and 1/2 gallon water.\par 1.Life style behavior modifications- fluids  management - cut down on coffee and iced tea.\par 2.Trial of Myrbetriq 25 mg po daily - side effects reviewed. \par 3.Refer to Dr Kim- for ED and low libido.\par 4.RTO in 4 months.

## 2019-12-03 NOTE — HISTORY OF PRESENT ILLNESS
[FreeTextEntry1] : \par 75 yo gentleman PMH Lordosis s/p multiple back surgery, doing OK with Oxybutynin ER 10  MG- dry mouth. Voids q 1-2  h day time and nocturia X 2, no UI.  Denies any obstructive voiding symptoms. He c/o erectile dysfunction and also low libido , naive to any ED treatment. He will stop  Oxybutynin ER 10  mg  and do trial of Myrbetriq 25 mg po daily- side effects reviewed. He will make fluids changes- cut down on Iced tea and coffee and increase water intake day time . He drinks 1 gallon at night- states he is not a diabetic . Coffee  20 oz,occ  Iced tea x  oz and 1/2 gallon water.\par

## 2019-12-18 ENCOUNTER — APPOINTMENT (OUTPATIENT)
Dept: UROLOGY | Facility: CLINIC | Age: 74
End: 2019-12-18
Payer: MEDICARE

## 2019-12-18 DIAGNOSIS — R68.82 DECREASED LIBIDO: ICD-10-CM

## 2019-12-18 DIAGNOSIS — N52.9 MALE ERECTILE DYSFUNCTION, UNSPECIFIED: ICD-10-CM

## 2019-12-18 PROCEDURE — 99214 OFFICE O/P EST MOD 30 MIN: CPT

## 2019-12-18 NOTE — HISTORY OF PRESENT ILLNESS
[FreeTextEntry1] : 74 year old  \par  50 years\par 3 children\par complaining of erectile dysfunction that he relates to hernia surgery 14 years.\par Wife currently disabled.\par 14 years have not had intercourse and essentially not sexually active\par complaining of fatigue\par "always wants to sleep"\par states that his libido is maintained\par masturbates occassionally to pornography with poor erection

## 2019-12-18 NOTE — PHYSICAL EXAM
[Abdomen Tenderness] : non-tender [Abdomen Soft] : soft [Abdomen Hernia] : no hernia was discovered [Abdomen Mass (___ Cm)] : no abdominal mass palpated [Penis Abnormality] : normal uncircumcised penis [Urethral Meatus] : meatus normal [Epididymis] : the epididymides were normal [Testes Tenderness] : no tenderness of the testes [] : no respiratory distress [Oriented To Time, Place, And Person] : oriented to person, place, and time [No Focal Deficits] : no focal deficits [FreeTextEntry1] : using cane

## 2019-12-18 NOTE — ASSESSMENT
[FreeTextEntry1] : 74 year old with chronic fatigue and sleepiness at all time raises issue of sleep apnea\par Risks factors for ED:\par 1. past tobacco consumption\par 2. obesity \par 3. vascular\par 4. situational\par 5. sleep apnea\par Recommend evaluation sleep evaluation\par Needs hormonal evaluation\par Discussed utilization of PD5 inhibitors.\par Discussed PD5-I possible side effects, onset of action, duration of action, and food interactions.  Discussed behavioral strategies to optimize efficacy of medication.  \par DIscussed the potential advantages and disadvantages as well as side effect profiles of each.\par Warned re priapism and need for intervention to prevent permanent penile injury,\par Will start Sildenafil 50 mg and progress to 100 mg as needed\par Discussed further evaluation if nonresponsive to PD5\par

## 2019-12-23 LAB
ALBUMIN SERPL ELPH-MCNC: 4.8 G/DL
ALP BLD-CCNC: 71 U/L
ALT SERPL-CCNC: 21 U/L
ANION GAP SERPL CALC-SCNC: 14 MMOL/L
AST SERPL-CCNC: 15 U/L
BASOPHILS # BLD AUTO: 0.05 K/UL
BASOPHILS NFR BLD AUTO: 0.5 %
BILIRUB SERPL-MCNC: 0.5 MG/DL
BUN SERPL-MCNC: 28 MG/DL
CALCIUM SERPL-MCNC: 9.8 MG/DL
CHLORIDE SERPL-SCNC: 100 MMOL/L
CO2 SERPL-SCNC: 25 MMOL/L
CREAT SERPL-MCNC: 1.26 MG/DL
EOSINOPHIL # BLD AUTO: 0.35 K/UL
EOSINOPHIL NFR BLD AUTO: 3.4 %
ESTRADIOL SERPL-MCNC: 40 PG/ML
GLUCOSE SERPL-MCNC: 112 MG/DL
HCT VFR BLD CALC: 45.7 %
HGB BLD-MCNC: 14.9 G/DL
IMM GRANULOCYTES NFR BLD AUTO: 0.5 %
LYMPHOCYTES # BLD AUTO: 1.64 K/UL
LYMPHOCYTES NFR BLD AUTO: 16 %
MAN DIFF?: NORMAL
MCHC RBC-ENTMCNC: 31.4 PG
MCHC RBC-ENTMCNC: 32.6 GM/DL
MCV RBC AUTO: 96.4 FL
MONOCYTES # BLD AUTO: 0.92 K/UL
MONOCYTES NFR BLD AUTO: 9 %
NEUTROPHILS # BLD AUTO: 7.22 K/UL
NEUTROPHILS NFR BLD AUTO: 70.6 %
PLATELET # BLD AUTO: 239 K/UL
POTASSIUM SERPL-SCNC: 4.6 MMOL/L
PROLACTIN SERPL-MCNC: 9.8 NG/ML
PROT SERPL-MCNC: 7.1 G/DL
RBC # BLD: 4.74 M/UL
RBC # FLD: 14.2 %
SODIUM SERPL-SCNC: 139 MMOL/L
TESTOST SERPL-MCNC: 222 NG/DL
TSH SERPL-ACNC: 2.8 UIU/ML
WBC # FLD AUTO: 10.23 K/UL

## 2019-12-23 NOTE — CONSULT NOTE ADULT - SUBJECTIVE AND OBJECTIVE BOX
The patient is a 72 year old female with a history of hypertension, GERD and anxiety who presented to the Er with complaints of headache and palpitations. According to the patient she is visiting her daughters from [place] for the past 4 months and ran out of her medications. Yesterday she had symptoms of headache, palpitations and epigastric discomfort. She checked her blood pressure and it was elevated therefore presented to the Er for evaluation. In the Er, BP of 194/101mmHg with a a HR of 104. Improved with IV lopressor 5mg x 1. Cardiac enzymes were negative, EKG with prolonged QT interval. Echocardiogram with normal LVSF and diastolic dysfunction. Blood pressure improved. Status post NST.        48 mins spent
73yMale w/PMHx gout(usually in large toe) T10-Pelvis fusion (6/29/18), BL TKA ([facility]), who presents with two days of right elbow pain with ROM, particullarly with extension, and reduced ROM. The patient denies fever/chills, recent trauma, changes in diet. He normally walks with a cane, but uses it in his left hand.      ROS: 10 point ROS otherwise negative    PMH:  PAD (peripheral artery disease)  Hypercholesterolemia  Hypertension    PSH:  H/O laminectomy  History of hernia repair  S/P hip replacement  S/P knee replacement, bilateral    AH:    Meds: See med rec    T(C): 37 (10-14-18 @ 15:34)  HR: 98 (10-14-18 @ 15:34)  BP: 138/75 (10-14-18 @ 15:34)  RR: 16 (10-14-18 @ 15:34)  SpO2: 100% (10-14-18 @ 15:34)  Wt(kg): --                          13.4   8.94  )-----------( 260      ( 14 Oct 2018 16:40 )             42.5   Sedimentation Rate, Erythrocyte (10.14.18 @ 18:49)    Sedimentation Rate, Erythrocyte: 23 mm/hr        Gen: NAD  Resp: Unlabored breathing  PE RUE:  Skin intact, pain with palpation of distal triceps/olecranon, small/minimal swelling/bursa palpated   SILT axillary/med/rad/ulnar  +Motor AIN/PIN/Ulnar/Radial/Musc/Median,   +painless Elbow ROM , pain with resisted extension, no pain with resisted flexion/supination/pronation  2+radial pulse, soft compartments.      Imaging:  < from: Xray Elbow AP + Lateral + Oblique, Right (10.14.18 @ 17:07) >  INTERPRETATION:  Noacute fracture or dislocation. No cortical erosions   or periosteal reactions to suggest osteomyelitis.    < end of copied text >

## 2020-02-13 NOTE — PROGRESS NOTE ADULT - EXTREMITIES
Addended by: NIECY HERNANDEZ on: 2/13/2020 09:21 AM     Modules accepted: Orders    
No cyanosis, clubbing or edema
No cyanosis, clubbing or edema

## 2020-02-17 ENCOUNTER — RX RENEWAL (OUTPATIENT)
Age: 75
End: 2020-02-17

## 2020-03-19 ENCOUNTER — NON-APPOINTMENT (OUTPATIENT)
Age: 75
End: 2020-03-19

## 2020-03-19 ENCOUNTER — APPOINTMENT (OUTPATIENT)
Dept: CARDIOLOGY | Facility: CLINIC | Age: 75
End: 2020-03-19
Payer: MEDICARE

## 2020-03-19 ENCOUNTER — MED ADMIN CHARGE (OUTPATIENT)
Age: 75
End: 2020-03-19

## 2020-03-19 VITALS
DIASTOLIC BLOOD PRESSURE: 70 MMHG | TEMPERATURE: 97.6 F | HEART RATE: 93 BPM | OXYGEN SATURATION: 97 % | SYSTOLIC BLOOD PRESSURE: 130 MMHG | HEIGHT: 74 IN

## 2020-03-19 DIAGNOSIS — R31.9 HEMATURIA, UNSPECIFIED: ICD-10-CM

## 2020-03-19 DIAGNOSIS — G56.00 CARPAL TUNNEL SYNDROME, UNSPECIFIED UPPER LIMB: ICD-10-CM

## 2020-03-19 LAB
CHOLEST SERPL-MCNC: 142 MG/DL
CHOLEST/HDLC SERPL: 2.9 RATIO
HDLC SERPL-MCNC: 49 MG/DL
LDLC SERPL CALC-MCNC: 65 MG/DL
TRIGL SERPL-MCNC: 141 MG/DL

## 2020-03-19 PROCEDURE — 99214 OFFICE O/P EST MOD 30 MIN: CPT

## 2020-03-19 PROCEDURE — 90670 PCV13 VACCINE IM: CPT

## 2020-03-19 PROCEDURE — 93000 ELECTROCARDIOGRAM COMPLETE: CPT

## 2020-03-19 PROCEDURE — G0009: CPT

## 2020-03-21 LAB
25(OH)D3 SERPL-MCNC: 17.8 NG/ML
ANION GAP SERPL CALC-SCNC: 13 MMOL/L
APPEARANCE: CLEAR
BACTERIA: NEGATIVE
BASOPHILS # BLD AUTO: 0.04 K/UL
BASOPHILS NFR BLD AUTO: 0.4 %
BILIRUBIN URINE: NEGATIVE
BLOOD URINE: NEGATIVE
BUN SERPL-MCNC: 39 MG/DL
CALCIUM SERPL-MCNC: 9.7 MG/DL
CHLORIDE SERPL-SCNC: 102 MMOL/L
CO2 SERPL-SCNC: 26 MMOL/L
COLOR: NORMAL
CREAT SERPL-MCNC: 1.43 MG/DL
EOSINOPHIL # BLD AUTO: 0.37 K/UL
EOSINOPHIL NFR BLD AUTO: 4.1 %
ESTIMATED AVERAGE GLUCOSE: 126 MG/DL
ESTIMATED AVERAGE GLUCOSE: 131 MG/DL
FERRITIN SERPL-MCNC: 109 NG/ML
FOLATE SERPL-MCNC: 12.5 NG/ML
GLUCOSE QUALITATIVE U: NEGATIVE
GLUCOSE SERPL-MCNC: 96 MG/DL
HBA1C MFR BLD HPLC: 6 %
HBA1C MFR BLD HPLC: 6.2 %
HCT VFR BLD CALC: 44.8 %
HGB BLD-MCNC: 14.7 G/DL
HYALINE CASTS: 0 /LPF
IMM GRANULOCYTES NFR BLD AUTO: 0.3 %
IRON SERPL-MCNC: 92 UG/DL
KETONES URINE: NEGATIVE
LEUKOCYTE ESTERASE URINE: NEGATIVE
LYMPHOCYTES # BLD AUTO: 1.85 K/UL
LYMPHOCYTES NFR BLD AUTO: 20.4 %
MAN DIFF?: NORMAL
MCHC RBC-ENTMCNC: 30.8 PG
MCHC RBC-ENTMCNC: 32.8 GM/DL
MCV RBC AUTO: 93.9 FL
MICROSCOPIC-UA: NORMAL
MONOCYTES # BLD AUTO: 0.94 K/UL
MONOCYTES NFR BLD AUTO: 10.3 %
NEUTROPHILS # BLD AUTO: 5.86 K/UL
NEUTROPHILS NFR BLD AUTO: 64.5 %
NITRITE URINE: NEGATIVE
PH URINE: 5.5
PLATELET # BLD AUTO: 247 K/UL
POTASSIUM SERPL-SCNC: 4.6 MMOL/L
PROTEIN URINE: NEGATIVE
RBC # BLD: 4.77 M/UL
RBC # FLD: 14.1 %
RED BLOOD CELLS URINE: 0 /HPF
SODIUM SERPL-SCNC: 141 MMOL/L
SPECIFIC GRAVITY URINE: 1.01
SQUAMOUS EPITHELIAL CELLS: 1 /HPF
T4 FREE SERPL-MCNC: 0.9 NG/DL
TESTOST BND SERPL-MCNC: 7.9 PG/ML
TESTOST SERPL-MCNC: 287.6 NG/DL
TSH SERPL-ACNC: 3.14 UIU/ML
URATE SERPL-MCNC: 8.7 MG/DL
URATE SERPL-MCNC: 9.3 MG/DL
UROBILINOGEN URINE: NORMAL
VIT B12 SERPL-MCNC: 387 PG/ML
WBC # FLD AUTO: 9.09 K/UL
WHITE BLOOD CELLS URINE: 2 /HPF

## 2020-03-22 LAB
ALBUMIN SERPL ELPH-MCNC: 4.7 G/DL
ALP BLD-CCNC: 67 U/L
ALT SERPL-CCNC: 30 U/L
AST SERPL-CCNC: 19 U/L
BILIRUB DIRECT SERPL-MCNC: 0.1 MG/DL
BILIRUB INDIRECT SERPL-MCNC: 0.2 MG/DL
BILIRUB SERPL-MCNC: 0.3 MG/DL
CHOLEST SERPL-MCNC: 165 MG/DL
CHOLEST/HDLC SERPL: 3.5 RATIO
HDLC SERPL-MCNC: 48 MG/DL
LDLC SERPL CALC-MCNC: 77 MG/DL
PROT SERPL-MCNC: 6.9 G/DL
TRIGL SERPL-MCNC: 204 MG/DL

## 2020-03-23 ENCOUNTER — OUTPATIENT (OUTPATIENT)
Dept: OUTPATIENT SERVICES | Facility: HOSPITAL | Age: 75
LOS: 1 days | End: 2020-03-23
Payer: MEDICARE

## 2020-03-23 ENCOUNTER — APPOINTMENT (OUTPATIENT)
Dept: CT IMAGING | Facility: IMAGING CENTER | Age: 75
End: 2020-03-23
Payer: MEDICARE

## 2020-03-23 DIAGNOSIS — Z98.890 OTHER SPECIFIED POSTPROCEDURAL STATES: Chronic | ICD-10-CM

## 2020-03-23 DIAGNOSIS — I77.810 THORACIC AORTIC ECTASIA: ICD-10-CM

## 2020-03-23 DIAGNOSIS — J18.9 PNEUMONIA, UNSPECIFIED ORGANISM: ICD-10-CM

## 2020-03-23 DIAGNOSIS — K43.9 VENTRAL HERNIA WITHOUT OBSTRUCTION OR GANGRENE: ICD-10-CM

## 2020-03-23 DIAGNOSIS — Z96.653 PRESENCE OF ARTIFICIAL KNEE JOINT, BILATERAL: Chronic | ICD-10-CM

## 2020-03-23 DIAGNOSIS — Z96.60 PRESENCE OF UNSPECIFIED ORTHOPEDIC JOINT IMPLANT: Chronic | ICD-10-CM

## 2020-03-23 PROCEDURE — 71250 CT THORAX DX C-: CPT

## 2020-03-23 PROCEDURE — 74176 CT ABD & PELVIS W/O CONTRAST: CPT

## 2020-03-23 PROCEDURE — 74176 CT ABD & PELVIS W/O CONTRAST: CPT | Mod: 26

## 2020-03-23 PROCEDURE — 71250 CT THORAX DX C-: CPT | Mod: 26

## 2020-03-23 RX ORDER — CEFDINIR 300 MG/1
300 CAPSULE ORAL TWICE DAILY
Qty: 20 | Refills: 0 | Status: COMPLETED | COMMUNITY
Start: 2020-03-23 | End: 2020-04-02

## 2020-04-01 ENCOUNTER — APPOINTMENT (OUTPATIENT)
Dept: CARDIOLOGY | Facility: CLINIC | Age: 75
End: 2020-04-01
Payer: MEDICARE

## 2020-04-01 PROCEDURE — 93015 CV STRESS TEST SUPVJ I&R: CPT

## 2020-04-01 PROCEDURE — 78452 HT MUSCLE IMAGE SPECT MULT: CPT

## 2020-04-01 PROCEDURE — 93306 TTE W/DOPPLER COMPLETE: CPT

## 2020-04-01 PROCEDURE — A9500: CPT

## 2020-05-10 LAB
SARS-COV-2 IGG SERPL IA-ACNC: <0.1 INDEX
SARS-COV-2 IGG SERPL QL IA: NEGATIVE

## 2020-05-11 ENCOUNTER — RESULT REVIEW (OUTPATIENT)
Age: 75
End: 2020-05-11

## 2020-05-11 ENCOUNTER — OUTPATIENT (OUTPATIENT)
Dept: OUTPATIENT SERVICES | Facility: HOSPITAL | Age: 75
LOS: 1 days | End: 2020-05-11
Payer: MEDICARE

## 2020-05-11 ENCOUNTER — APPOINTMENT (OUTPATIENT)
Dept: CT IMAGING | Facility: IMAGING CENTER | Age: 75
End: 2020-05-11
Payer: MEDICARE

## 2020-05-11 DIAGNOSIS — J18.9 PNEUMONIA, UNSPECIFIED ORGANISM: ICD-10-CM

## 2020-05-11 DIAGNOSIS — Z98.890 OTHER SPECIFIED POSTPROCEDURAL STATES: Chronic | ICD-10-CM

## 2020-05-11 DIAGNOSIS — Z96.653 PRESENCE OF ARTIFICIAL KNEE JOINT, BILATERAL: Chronic | ICD-10-CM

## 2020-05-11 DIAGNOSIS — Z96.60 PRESENCE OF UNSPECIFIED ORTHOPEDIC JOINT IMPLANT: Chronic | ICD-10-CM

## 2020-05-11 PROCEDURE — 71250 CT THORAX DX C-: CPT | Mod: 26

## 2020-05-11 PROCEDURE — 71250 CT THORAX DX C-: CPT

## 2020-09-02 NOTE — ASU PREOP CHECKLIST - ASSESSMENT, HISTORY & PHYSICAL COMPLETED AND ON MEDICAL RECORD
done Inflammation Suggestive Of Cancer Camouflage Histology Text: There was a dense lymphocytic infiltrate which prevented adequate histologic evaluation of adjacent structures.

## 2020-09-29 ENCOUNTER — LABORATORY RESULT (OUTPATIENT)
Age: 75
End: 2020-09-29

## 2020-09-29 ENCOUNTER — NON-APPOINTMENT (OUTPATIENT)
Age: 75
End: 2020-09-29

## 2020-09-29 ENCOUNTER — APPOINTMENT (OUTPATIENT)
Dept: CARDIOLOGY | Facility: CLINIC | Age: 75
End: 2020-09-29
Payer: MEDICARE

## 2020-09-29 VITALS
SYSTOLIC BLOOD PRESSURE: 142 MMHG | TEMPERATURE: 98.4 F | HEIGHT: 74 IN | BODY MASS INDEX: 40.43 KG/M2 | HEART RATE: 79 BPM | DIASTOLIC BLOOD PRESSURE: 76 MMHG | WEIGHT: 315 LBS | OXYGEN SATURATION: 97 %

## 2020-09-29 DIAGNOSIS — Z87.898 PERSONAL HISTORY OF OTHER SPECIFIED CONDITIONS: ICD-10-CM

## 2020-09-29 DIAGNOSIS — Z23 ENCOUNTER FOR IMMUNIZATION: ICD-10-CM

## 2020-09-29 DIAGNOSIS — Z71.89 OTHER SPECIFIED COUNSELING: ICD-10-CM

## 2020-09-29 DIAGNOSIS — Z13.228 ENCOUNTER FOR SCREENING FOR OTHER METABOLIC DISORDERS: ICD-10-CM

## 2020-09-29 PROCEDURE — 93000 ELECTROCARDIOGRAM COMPLETE: CPT

## 2020-09-29 PROCEDURE — G0008: CPT

## 2020-09-29 PROCEDURE — 90662 IIV NO PRSV INCREASED AG IM: CPT

## 2020-09-29 PROCEDURE — 99213 OFFICE O/P EST LOW 20 MIN: CPT

## 2020-09-29 RX ORDER — FUROSEMIDE 40 MG/1
40 TABLET ORAL
Qty: 90 | Refills: 3 | Status: DISCONTINUED | COMMUNITY
Start: 2019-06-04 | End: 2020-09-29

## 2020-09-29 NOTE — PHYSICAL EXAM
[General Appearance - Well Developed] : well developed [Normal Appearance] : normal appearance [Well Groomed] : well groomed [General Appearance - Well Nourished] : well nourished [No Deformities] : no deformities [General Appearance - In No Acute Distress] : no acute distress [Normal Conjunctiva] : the conjunctiva exhibited no abnormalities [Eyelids - No Xanthelasma] : the eyelids demonstrated no xanthelasmas [Normal Oral Mucosa] : normal oral mucosa [No Oral Pallor] : no oral pallor [No Oral Cyanosis] : no oral cyanosis [Normal Jugular Venous A Waves Present] : normal jugular venous A waves present [Normal Jugular Venous V Waves Present] : normal jugular venous V waves present [No Jugular Venous Amado A Waves] : no jugular venous amado A waves [Respiration, Rhythm And Depth] : normal respiratory rhythm and effort [Exaggerated Use Of Accessory Muscles For Inspiration] : no accessory muscle use [Auscultation Breath Sounds / Voice Sounds] : lungs were clear to auscultation bilaterally [Heart Rate And Rhythm] : heart rate and rhythm were normal [Heart Sounds] : normal S1 and S2 [Murmurs] : no murmurs present [Abdomen Soft] : soft [Abdomen Tenderness] : non-tender [Abdomen Mass (___ Cm)] : no abdominal mass palpated [Abnormal Walk] : normal gait [Gait - Sufficient For Exercise Testing] : the gait was sufficient for exercise testing [FreeTextEntry1] : 2+ edema bilaterally  [Skin Color & Pigmentation] : normal skin color and pigmentation [] : no rash [No Venous Stasis] : no venous stasis [Skin Lesions] : no skin lesions [No Skin Ulcers] : no skin ulcer [No Xanthoma] : no  xanthoma was observed [Oriented To Time, Place, And Person] : oriented to person, place, and time [Affect] : the affect was normal [Mood] : the mood was normal [No Anxiety] : not feeling anxious

## 2020-09-29 NOTE — HISTORY OF PRESENT ILLNESS
[FreeTextEntry1] : pt presents for f/u pt with increasing fatigue and leg swelling pt denies any chest  pain dizziness ,lightheadedness ,nausea vomiting diaphoresis\par

## 2020-10-08 ENCOUNTER — LABORATORY RESULT (OUTPATIENT)
Age: 75
End: 2020-10-08

## 2020-10-08 ENCOUNTER — APPOINTMENT (OUTPATIENT)
Dept: CARDIOLOGY | Facility: CLINIC | Age: 75
End: 2020-10-08
Payer: MEDICARE

## 2020-10-08 DIAGNOSIS — E34.9 ENDOCRINE DISORDER, UNSPECIFIED: ICD-10-CM

## 2020-10-08 PROCEDURE — 99213 OFFICE O/P EST LOW 20 MIN: CPT

## 2020-10-11 VITALS — HEART RATE: 92 BPM

## 2020-10-11 VITALS
BODY MASS INDEX: 39.4 KG/M2 | TEMPERATURE: 99.1 F | DIASTOLIC BLOOD PRESSURE: 70 MMHG | HEIGHT: 74 IN | SYSTOLIC BLOOD PRESSURE: 130 MMHG | WEIGHT: 307 LBS

## 2020-10-11 VITALS — OXYGEN SATURATION: 97 %

## 2020-10-11 PROBLEM — E34.9 HYPOTESTOSTERONEMIA: Status: ACTIVE | Noted: 2019-12-23

## 2020-10-11 NOTE — PHYSICAL EXAM
[General Appearance - Well Developed] : well developed [Normal Appearance] : normal appearance [Well Groomed] : well groomed [General Appearance - Well Nourished] : well nourished [No Deformities] : no deformities [General Appearance - In No Acute Distress] : no acute distress [Normal Conjunctiva] : the conjunctiva exhibited no abnormalities [Eyelids - No Xanthelasma] : the eyelids demonstrated no xanthelasmas [Normal Oral Mucosa] : normal oral mucosa [No Oral Pallor] : no oral pallor [No Oral Cyanosis] : no oral cyanosis [Normal Jugular Venous A Waves Present] : normal jugular venous A waves present [Normal Jugular Venous V Waves Present] : normal jugular venous V waves present [No Jugular Venous Amado A Waves] : no jugular venous amado A waves [Respiration, Rhythm And Depth] : normal respiratory rhythm and effort [Exaggerated Use Of Accessory Muscles For Inspiration] : no accessory muscle use [Auscultation Breath Sounds / Voice Sounds] : lungs were clear to auscultation bilaterally [Heart Rate And Rhythm] : heart rate and rhythm were normal [Heart Sounds] : normal S1 and S2 [Murmurs] : no murmurs present [Abdomen Soft] : soft [Abdomen Tenderness] : non-tender [Abdomen Mass (___ Cm)] : no abdominal mass palpated [Abnormal Walk] : normal gait [Gait - Sufficient For Exercise Testing] : the gait was sufficient for exercise testing [Skin Color & Pigmentation] : normal skin color and pigmentation [] : no rash [No Venous Stasis] : no venous stasis [Skin Lesions] : no skin lesions [No Skin Ulcers] : no skin ulcer [No Xanthoma] : no  xanthoma was observed [Oriented To Time, Place, And Person] : oriented to person, place, and time [Affect] : the affect was normal [Mood] : the mood was normal [No Anxiety] : not feeling anxious [FreeTextEntry1] : trace edema

## 2020-10-11 NOTE — HISTORY OF PRESENT ILLNESS
[FreeTextEntry1] : pt presents for f/u pt here for f/u as lasix was changed to torsemide last visit .pt with improved leg swelling .pt feels well pt however s/p labs low testosterone \par pt denies any chest  pain dizziness ,lightheadedness ,nausea vomiting diaphoresis\par

## 2020-11-09 NOTE — PATIENT PROFILE ADULT - LAST BOWEL MOVEMENT DATE
INTRAOPERATIVE FLUOROSCOPY



INDICATION / CLINICAL INFORMATION:

RT URETERAL STONE. 



TECHNIQUE:

Intraoperative spot images were obtained during the procedure.



FINDINGS:

Intraoperative fluoroscopy images  for RT URETEROSCOPY, RT URETERAL STONE SEEN. USED HOLIMUM LASER TO
 BREAK UP. RT RPG, RT STENT EXCHANGE, LT STENT REMOVAL



See operative/procedure note by performing physician for full details.



Fluoroscopy Time: 1 minute and 2 seconds. Fluoroscopy Images: 5.



Signer Name: Jose Garcia MD 

Signed: 11/9/2020 1:22 PM

Workstation Name: [id number]
30-Jan-2019

## 2020-12-02 ENCOUNTER — APPOINTMENT (OUTPATIENT)
Dept: ENDOCRINOLOGY | Facility: CLINIC | Age: 75
End: 2020-12-02
Payer: MEDICARE

## 2020-12-02 VITALS
RESPIRATION RATE: 16 BRPM | HEART RATE: 92 BPM | BODY MASS INDEX: 39.4 KG/M2 | WEIGHT: 307 LBS | OXYGEN SATURATION: 97 % | TEMPERATURE: 99.1 F | SYSTOLIC BLOOD PRESSURE: 130 MMHG | HEIGHT: 74 IN | DIASTOLIC BLOOD PRESSURE: 70 MMHG

## 2020-12-02 PROCEDURE — 99204 OFFICE O/P NEW MOD 45 MIN: CPT | Mod: 25

## 2020-12-02 PROCEDURE — 36415 COLL VENOUS BLD VENIPUNCTURE: CPT

## 2020-12-03 ENCOUNTER — APPOINTMENT (OUTPATIENT)
Dept: CARDIOLOGY | Facility: CLINIC | Age: 75
End: 2020-12-03
Payer: MEDICARE

## 2020-12-03 VITALS
BODY MASS INDEX: 39.4 KG/M2 | DIASTOLIC BLOOD PRESSURE: 78 MMHG | HEART RATE: 87 BPM | WEIGHT: 307 LBS | HEIGHT: 74 IN | OXYGEN SATURATION: 97 % | TEMPERATURE: 97.2 F | SYSTOLIC BLOOD PRESSURE: 120 MMHG | RESPIRATION RATE: 17 BRPM

## 2020-12-03 DIAGNOSIS — R79.89 OTHER SPECIFIED ABNORMAL FINDINGS OF BLOOD CHEMISTRY: ICD-10-CM

## 2020-12-03 DIAGNOSIS — R53.83 OTHER FATIGUE: ICD-10-CM

## 2020-12-03 PROCEDURE — 99213 OFFICE O/P EST LOW 20 MIN: CPT | Mod: 25

## 2020-12-03 PROCEDURE — 93970 EXTREMITY STUDY: CPT

## 2020-12-03 NOTE — HISTORY OF PRESENT ILLNESS
[FreeTextEntry1] : This is a 75 year old gentlemen who presents today for follow up after noticing recently that he has had increased leg swelling around his ankle area. Patient had Venous Doppler today, which was unremarkable. Patient taking Torsemide 20 mg daily. Patient states that he has been following with Dr. Hernadez closely re: low testosterone. Patient had blood work ordered this AM by Dr. Hameed and Dr. Hernadez. pt with daytime fatigue

## 2020-12-03 NOTE — REVIEW OF SYSTEMS
[Lower Ext Edema] : lower extremity edema [Negative] : Heme/Lymph [Feeling Fatigued] : feeling fatigued [Shortness Of Breath] : no shortness of breath [Dyspnea on exertion] : not dyspnea during exertion [Chest  Pressure] : no chest pressure [Chest Pain] : no chest pain [Leg Claudication] : no intermittent leg claudication [Palpitations] : no palpitations

## 2020-12-03 NOTE — PHYSICAL EXAM
[General Appearance - Well Developed] : well developed [Normal Appearance] : normal appearance [Well Groomed] : well groomed [General Appearance - Well Nourished] : well nourished [No Deformities] : no deformities [General Appearance - In No Acute Distress] : no acute distress [Normal Conjunctiva] : the conjunctiva exhibited no abnormalities [Eyelids - No Xanthelasma] : the eyelids demonstrated no xanthelasmas [Normal Oral Mucosa] : normal oral mucosa [No Oral Pallor] : no oral pallor [No Oral Cyanosis] : no oral cyanosis [Normal Jugular Venous A Waves Present] : normal jugular venous A waves present [Normal Jugular Venous V Waves Present] : normal jugular venous V waves present [No Jugular Venous Amado A Waves] : no jugular venous amado A waves [Respiration, Rhythm And Depth] : normal respiratory rhythm and effort [Exaggerated Use Of Accessory Muscles For Inspiration] : no accessory muscle use [Auscultation Breath Sounds / Voice Sounds] : lungs were clear to auscultation bilaterally [Heart Rate And Rhythm] : heart rate and rhythm were normal [Heart Sounds] : normal S1 and S2 [Murmurs] : no murmurs present [Abdomen Soft] : soft [Abdomen Tenderness] : non-tender [Abdomen Mass (___ Cm)] : no abdominal mass palpated [Abnormal Walk] : normal gait [Gait - Sufficient For Exercise Testing] : the gait was sufficient for exercise testing [Nail Clubbing] : no clubbing of the fingernails [Cyanosis, Localized] : no localized cyanosis [Petechial Hemorrhages (___cm)] : no petechial hemorrhages [Skin Color & Pigmentation] : normal skin color and pigmentation [] : no rash [No Venous Stasis] : no venous stasis [Skin Lesions] : no skin lesions [No Skin Ulcers] : no skin ulcer [No Xanthoma] : no  xanthoma was observed [Oriented To Time, Place, And Person] : oriented to person, place, and time [Affect] : the affect was normal [Mood] : the mood was normal [No Anxiety] : not feeling anxious [FreeTextEntry1] : bilateral leg edema +2

## 2020-12-11 ENCOUNTER — NON-APPOINTMENT (OUTPATIENT)
Age: 75
End: 2020-12-11

## 2020-12-21 NOTE — HISTORY OF PRESENT ILLNESS
[FreeTextEntry1] : Mr. HERNANDEZ  is a 75 year year old male  who presents for endocrine consultation. He presents via the kind courtesy of   He  presents with regard to a history of  fatigue/falling asleep very frequently\par Additional medical history includes that of gout, htn and hyperlipidemia along with Pre DM\par Recent A1c  at 5.9%\par Testost  229 in September \par The fatigue pre dates  the pandemic but too has had 4 major back operations in past 2 years\par Has 4 rods in place\par No signif improvement re pain\par Is taking allopurinol , metoprolol 25 mg-taken for several years and losartan along with Crestor.\par Vit d WAS 17.8 in March and b-12 was  at 380-not on any supplementation.\par

## 2021-01-05 ENCOUNTER — NON-APPOINTMENT (OUTPATIENT)
Age: 76
End: 2021-01-05

## 2021-01-06 ENCOUNTER — NON-APPOINTMENT (OUTPATIENT)
Age: 76
End: 2021-01-06

## 2021-01-07 ENCOUNTER — NON-APPOINTMENT (OUTPATIENT)
Age: 76
End: 2021-01-07

## 2021-01-10 LAB
ALBUMIN SERPL ELPH-MCNC: 4.5 G/DL
ALP BLD-CCNC: 57 U/L
ALT SERPL-CCNC: 39 U/L
ANION GAP SERPL CALC-SCNC: 12 MMOL/L
ANION GAP SERPL CALC-SCNC: 12 MMOL/L
APPEARANCE: CLEAR
AST SERPL-CCNC: 26 U/L
BACTERIA: NEGATIVE
BASOPHILS # BLD AUTO: 0.04 K/UL
BASOPHILS NFR BLD AUTO: 0.6 %
BILIRUB SERPL-MCNC: 0.5 MG/DL
BILIRUBIN URINE: NEGATIVE
BLOOD URINE: NEGATIVE
BUN SERPL-MCNC: 28 MG/DL
BUN SERPL-MCNC: 34 MG/DL
CALCIUM SERPL-MCNC: 9.7 MG/DL
CALCIUM SERPL-MCNC: 9.7 MG/DL
CHLORIDE SERPL-SCNC: 102 MMOL/L
CHLORIDE SERPL-SCNC: 104 MMOL/L
CHOLEST SERPL-MCNC: 142 MG/DL
CHOLEST/HDLC SERPL: 3.4 RATIO
CO2 SERPL-SCNC: 24 MMOL/L
CO2 SERPL-SCNC: 26 MMOL/L
COLOR: YELLOW
CREAT SERPL-MCNC: 1.28 MG/DL
CREAT SERPL-MCNC: 1.35 MG/DL
CREAT SPEC-SCNC: 107 MG/DL
EOSINOPHIL # BLD AUTO: 0.27 K/UL
EOSINOPHIL NFR BLD AUTO: 3.9 %
ESTIMATED AVERAGE GLUCOSE: 123 MG/DL
GLUCOSE QUALITATIVE U: NEGATIVE
GLUCOSE SERPL-MCNC: 129 MG/DL
GLUCOSE SERPL-MCNC: 98 MG/DL
HBA1C MFR BLD HPLC: 5.9 %
HCT VFR BLD CALC: 46.2 %
HDLC SERPL-MCNC: 42 MG/DL
HGB BLD-MCNC: 14.6 G/DL
HYALINE CASTS: 0 /LPF
IMM GRANULOCYTES NFR BLD AUTO: 0.3 %
KETONES URINE: NEGATIVE
LDLC SERPL CALC-MCNC: 73 MG/DL
LEUKOCYTE ESTERASE URINE: NEGATIVE
LYMPHOCYTES # BLD AUTO: 1.8 K/UL
LYMPHOCYTES NFR BLD AUTO: 26 %
MAGNESIUM SERPL-MCNC: 2.1 MG/DL
MAN DIFF?: NORMAL
MCHC RBC-ENTMCNC: 31.4 PG
MCHC RBC-ENTMCNC: 31.6 GM/DL
MCV RBC AUTO: 99.4 FL
MICROALBUMIN 24H UR DL<=1MG/L-MCNC: <1.2 MG/DL
MICROALBUMIN/CREAT 24H UR-RTO: NORMAL MG/G
MICROSCOPIC-UA: NORMAL
MONOCYTES # BLD AUTO: 0.67 K/UL
MONOCYTES NFR BLD AUTO: 9.7 %
NEUTROPHILS # BLD AUTO: 4.11 K/UL
NEUTROPHILS NFR BLD AUTO: 59.5 %
NITRITE URINE: NEGATIVE
NT-PROBNP SERPL-MCNC: 131 PG/ML
NT-PROBNP SERPL-MCNC: 91 PG/ML
PH URINE: 6
PHOSPHATE SERPL-MCNC: 3.4 MG/DL
PLATELET # BLD AUTO: 202 K/UL
POTASSIUM SERPL-SCNC: 4.3 MMOL/L
POTASSIUM SERPL-SCNC: 4.7 MMOL/L
PROT SERPL-MCNC: 7.1 G/DL
PROTEIN URINE: NEGATIVE
PSA SERPL-MCNC: 1.45 NG/ML
RBC # BLD: 4.65 M/UL
RBC # FLD: 14.5 %
RED BLOOD CELLS URINE: 1 /HPF
SARS-COV-2 IGG SERPL IA-ACNC: 0 INDEX
SARS-COV-2 IGG SERPL QL IA: NEGATIVE
SODIUM SERPL-SCNC: 140 MMOL/L
SODIUM SERPL-SCNC: 141 MMOL/L
SPECIFIC GRAVITY URINE: 1.02
SQUAMOUS EPITHELIAL CELLS: 2 /HPF
T3RU NFR SERPL: 1 TBI
T4 FREE SERPL-MCNC: 0.9 NG/DL
T4 SERPL-MCNC: 5.1 UG/DL
TESTOST BND SERPL-MCNC: 4 PG/ML
TESTOST SERPL-MCNC: 221.4 NG/DL
TRIGL SERPL-MCNC: 135 MG/DL
TSH SERPL-ACNC: 2.38 UIU/ML
URATE SERPL-MCNC: 7.1 MG/DL
UROBILINOGEN URINE: NORMAL
WBC # FLD AUTO: 6.91 K/UL
WHITE BLOOD CELLS URINE: 2 /HPF

## 2021-01-13 ENCOUNTER — NON-APPOINTMENT (OUTPATIENT)
Age: 76
End: 2021-01-13

## 2021-01-22 NOTE — OCCUPATIONAL THERAPY INITIAL EVALUATION ADULT - HOME MANAGEMENT SKILLS, PREVIOUS LEVEL OF FUNCTION, OT EVAL
<<----- Click to add NO pertinent Past Medical History needs device No pertinent past medical history

## 2021-02-07 ENCOUNTER — NON-APPOINTMENT (OUTPATIENT)
Age: 76
End: 2021-02-07

## 2021-02-21 LAB — SARS-COV-2 N GENE NPH QL NAA+PROBE: NOT DETECTED

## 2021-03-13 LAB
25(OH)D3 SERPL-MCNC: 27.4 NG/ML
FSH SERPL-MCNC: 5.4 IU/L
LH SERPL-ACNC: 5.3 IU/L
PROLACTIN SERPL-MCNC: 13.4 NG/ML
TESTOST BND SERPL-MCNC: 5.6 PG/ML
TESTOST SERPL-MCNC: 178.3 NG/DL
VIT B12 SERPL-MCNC: 469 PG/ML

## 2021-05-06 ENCOUNTER — RX RENEWAL (OUTPATIENT)
Age: 76
End: 2021-05-06

## 2021-05-26 ENCOUNTER — NON-APPOINTMENT (OUTPATIENT)
Age: 76
End: 2021-05-26

## 2021-05-26 ENCOUNTER — APPOINTMENT (OUTPATIENT)
Dept: CARDIOLOGY | Facility: CLINIC | Age: 76
End: 2021-05-26
Payer: MEDICARE

## 2021-05-26 VITALS
HEIGHT: 74 IN | OXYGEN SATURATION: 97 % | HEART RATE: 81 BPM | BODY MASS INDEX: 40.3 KG/M2 | WEIGHT: 314 LBS | DIASTOLIC BLOOD PRESSURE: 80 MMHG | SYSTOLIC BLOOD PRESSURE: 120 MMHG

## 2021-05-26 DIAGNOSIS — Z12.5 ENCOUNTER FOR SCREENING FOR MALIGNANT NEOPLASM OF PROSTATE: ICD-10-CM

## 2021-05-26 DIAGNOSIS — H61.20 IMPACTED CERUMEN, UNSPECIFIED EAR: ICD-10-CM

## 2021-05-26 DIAGNOSIS — L81.2 FRECKLES: ICD-10-CM

## 2021-05-26 PROCEDURE — 99214 OFFICE O/P EST MOD 30 MIN: CPT

## 2021-05-26 PROCEDURE — 93000 ELECTROCARDIOGRAM COMPLETE: CPT

## 2021-05-26 PROCEDURE — 90732 PPSV23 VACC 2 YRS+ SUBQ/IM: CPT

## 2021-05-26 PROCEDURE — G0009: CPT

## 2021-05-26 NOTE — REVIEW OF SYSTEMS
[Feeling Fatigued] : feeling fatigued [SOB] : shortness of breath [Dyspnea on exertion] : dyspnea during exertion [Lower Ext Edema] : lower extremity edema [Negative] : Heme/Lymph [Fever] : no fever [Headache] : no headache [Weight Gain (___ Lbs)] : no recent weight gain [Chills] : no chills [Weight Loss (___ Lbs)] : no recent weight loss [Chest Discomfort] : no chest discomfort [Leg Claudication] : no intermittent leg claudication [Palpitations] : no palpitations [Orthopnea] : no orthopnea [PND] : no PND [Syncope] : no syncope

## 2021-05-26 NOTE — PHYSICAL EXAM
[Well Developed] : well developed [Well Nourished] : well nourished [No Acute Distress] : no acute distress [Normal Conjunctiva] : normal conjunctiva [Normal Venous Pressure] : normal venous pressure [No Carotid Bruit] : no carotid bruit [Normal S1, S2] : normal S1, S2 [No Murmur] : no murmur [No Rub] : no rub [No Gallop] : no gallop [Clear Lung Fields] : clear lung fields [Good Air Entry] : good air entry [No Respiratory Distress] : no respiratory distress  [Soft] : abdomen soft [Non Tender] : non-tender [No Masses/organomegaly] : no masses/organomegaly [Normal Bowel Sounds] : normal bowel sounds [Normal Gait] : normal gait [No Cyanosis] : no cyanosis [No Clubbing] : no clubbing [No Varicosities] : no varicosities [No Rash] : no rash [No Skin Lesions] : no skin lesions [Moves all extremities] : moves all extremities [Normal Speech] : normal speech [No Focal Deficits] : no focal deficits [Alert and Oriented] : alert and oriented [Normal memory] : normal memory [de-identified] : cerumen impaction bilaterally  [de-identified] : bilateral leg swelling

## 2021-05-26 NOTE — HISTORY OF PRESENT ILLNESS
[FreeTextEntry1] : This is a 75 year old gentlemen with a PMH of HTN, HLD, Low testosterone, Gout, Vitamin D Deficiency, DIOGO, Dilated Aorta presents today for follow up. Patient states that the swelling has not resolved. Patient also reports fatigue and persistent shortness of breath. Patient denies palpitations, chest pain, nausea, vomiting, dizziness and lightheadedness.\par

## 2021-06-01 ENCOUNTER — APPOINTMENT (OUTPATIENT)
Dept: CARDIOLOGY | Facility: CLINIC | Age: 76
End: 2021-06-01
Payer: MEDICARE

## 2021-06-01 LAB
25(OH)D3 SERPL-MCNC: 75.3 NG/ML
ANION GAP SERPL CALC-SCNC: 12 MMOL/L
BUN SERPL-MCNC: 27 MG/DL
CALCIUM SERPL-MCNC: 9.8 MG/DL
CHLORIDE SERPL-SCNC: 100 MMOL/L
CO2 SERPL-SCNC: 28 MMOL/L
CREAT SERPL-MCNC: 1.27 MG/DL
ESTIMATED AVERAGE GLUCOSE: 128 MG/DL
FERRITIN SERPL-MCNC: 229 NG/ML
FOLATE SERPL-MCNC: 19.8 NG/ML
GLUCOSE SERPL-MCNC: 107 MG/DL
HBA1C MFR BLD HPLC: 6.1 %
IRON SATN MFR SERPL: 22 %
IRON SERPL-MCNC: 73 UG/DL
POTASSIUM SERPL-SCNC: 3.9 MMOL/L
PSA SERPL-MCNC: 1.52 NG/ML
SODIUM SERPL-SCNC: 140 MMOL/L
T4 FREE SERPL-MCNC: 1 NG/DL
TIBC SERPL-MCNC: 331 UG/DL
TSH SERPL-ACNC: 2.46 UIU/ML
UIBC SERPL-MCNC: 258 UG/DL
URATE SERPL-MCNC: 8.4 MG/DL
VIT B12 SERPL-MCNC: 661 PG/ML

## 2021-06-01 PROCEDURE — 93306 TTE W/DOPPLER COMPLETE: CPT

## 2021-06-22 ENCOUNTER — APPOINTMENT (OUTPATIENT)
Dept: CARDIOLOGY | Facility: CLINIC | Age: 76
End: 2021-06-22
Payer: MEDICARE

## 2021-06-22 VITALS
DIASTOLIC BLOOD PRESSURE: 62 MMHG | HEART RATE: 86 BPM | OXYGEN SATURATION: 96 % | HEIGHT: 74 IN | BODY MASS INDEX: 39.91 KG/M2 | WEIGHT: 311 LBS | SYSTOLIC BLOOD PRESSURE: 120 MMHG | TEMPERATURE: 98.1 F

## 2021-06-22 PROCEDURE — 99214 OFFICE O/P EST MOD 30 MIN: CPT

## 2021-06-22 NOTE — HISTORY OF PRESENT ILLNESS
[FreeTextEntry1] : This is a 75 year old gentlemen with a PMH of HTN, HLD, Low testosterone, Gout, Vitamin D Deficiency, DIOGO, Dilated Aorta presents today for follow up. Patient previously seen in office 1 month ago, ordered for echo and CT chest w/o contrast for dilated aorta and Torsemide was increased to 40mg daily with KCl 20meq daily for BLLE edema. Patient states that he has been compliant with taking 40mg daily but has not had any improvement in his edema, no further issues or concerns for today.

## 2021-06-23 ENCOUNTER — APPOINTMENT (OUTPATIENT)
Dept: CARDIOLOGY | Facility: CLINIC | Age: 76
End: 2021-06-23
Payer: MEDICARE

## 2021-06-23 PROCEDURE — 93970 EXTREMITY STUDY: CPT

## 2021-07-14 ENCOUNTER — APPOINTMENT (OUTPATIENT)
Dept: CARDIOLOGY | Facility: CLINIC | Age: 76
End: 2021-07-14
Payer: MEDICARE

## 2021-07-14 VITALS
OXYGEN SATURATION: 97 % | HEIGHT: 60 IN | WEIGHT: 311 LBS | SYSTOLIC BLOOD PRESSURE: 130 MMHG | DIASTOLIC BLOOD PRESSURE: 60 MMHG | HEART RATE: 90 BPM | BODY MASS INDEX: 61.06 KG/M2 | TEMPERATURE: 98.1 F

## 2021-07-14 DIAGNOSIS — R79.89 OTHER SPECIFIED ABNORMAL FINDINGS OF BLOOD CHEMISTRY: ICD-10-CM

## 2021-07-14 DIAGNOSIS — Z12.11 ENCOUNTER FOR SCREENING FOR MALIGNANT NEOPLASM OF COLON: ICD-10-CM

## 2021-07-14 PROCEDURE — 99214 OFFICE O/P EST MOD 30 MIN: CPT

## 2021-07-14 NOTE — HISTORY OF PRESENT ILLNESS
[FreeTextEntry1] : This is a 75 year old gentlemen with a PMH of HTN, HLD, Low testosterone, Gout, Vitamin D Deficiency, DIOGO, Dilated Aorta presents today for follow up. Patient was previously seen in office 3 weeks ago for BLLE edema f/u. Torsemide was increased to 40mg a.m and 20mg p.m. with 40meq of KCL daily. Venous dopplers completed and normal.  Patient also with hx of snoring .pt with improved dyspnea pt denies any chest  pain dizziness ,lightheadedness ,nausea vomiting diaphoresis\par

## 2021-07-14 NOTE — PHYSICAL EXAM

## 2021-07-20 ENCOUNTER — APPOINTMENT (OUTPATIENT)
Dept: CT IMAGING | Facility: IMAGING CENTER | Age: 76
End: 2021-07-20
Payer: MEDICARE

## 2021-07-20 ENCOUNTER — OUTPATIENT (OUTPATIENT)
Dept: OUTPATIENT SERVICES | Facility: HOSPITAL | Age: 76
LOS: 1 days | End: 2021-07-20
Payer: MEDICARE

## 2021-07-20 DIAGNOSIS — Z96.60 PRESENCE OF UNSPECIFIED ORTHOPEDIC JOINT IMPLANT: Chronic | ICD-10-CM

## 2021-07-20 DIAGNOSIS — Z96.653 PRESENCE OF ARTIFICIAL KNEE JOINT, BILATERAL: Chronic | ICD-10-CM

## 2021-07-20 DIAGNOSIS — Z98.890 OTHER SPECIFIED POSTPROCEDURAL STATES: Chronic | ICD-10-CM

## 2021-07-20 DIAGNOSIS — I77.810 THORACIC AORTIC ECTASIA: ICD-10-CM

## 2021-07-20 PROCEDURE — 71250 CT THORAX DX C-: CPT

## 2021-07-20 PROCEDURE — 71250 CT THORAX DX C-: CPT | Mod: 26,ME

## 2021-07-20 PROCEDURE — G1004: CPT

## 2021-07-22 ENCOUNTER — NON-APPOINTMENT (OUTPATIENT)
Age: 76
End: 2021-07-22

## 2021-07-22 DIAGNOSIS — R93.89 ABNORMAL FINDINGS ON DIAGNOSTIC IMAGING OF OTHER SPECIFIED BODY STRUCTURES: ICD-10-CM

## 2021-07-26 ENCOUNTER — NON-APPOINTMENT (OUTPATIENT)
Age: 76
End: 2021-07-26

## 2021-07-26 LAB
ALBUMIN SERPL ELPH-MCNC: 4.6 G/DL
ALP BLD-CCNC: 62 U/L
ALT SERPL-CCNC: 52 U/L
ANION GAP SERPL CALC-SCNC: 13 MMOL/L
ANION GAP SERPL CALC-SCNC: 15 MMOL/L
APPEARANCE: CLEAR
AST SERPL-CCNC: 34 U/L
BACTERIA: NEGATIVE
BASOPHILS # BLD AUTO: 0.04 K/UL
BASOPHILS NFR BLD AUTO: 0.5 %
BILIRUB DIRECT SERPL-MCNC: 0.2 MG/DL
BILIRUB INDIRECT SERPL-MCNC: 0.3 MG/DL
BILIRUB SERPL-MCNC: 0.4 MG/DL
BILIRUBIN URINE: NEGATIVE
BLOOD URINE: NEGATIVE
BUN SERPL-MCNC: 28 MG/DL
BUN SERPL-MCNC: 34 MG/DL
CALCIUM SERPL-MCNC: 10.2 MG/DL
CALCIUM SERPL-MCNC: 9.9 MG/DL
CHLORIDE SERPL-SCNC: 99 MMOL/L
CHLORIDE SERPL-SCNC: 99 MMOL/L
CHOLEST SERPL-MCNC: 167 MG/DL
CK SERPL-CCNC: 79 U/L
CO2 SERPL-SCNC: 26 MMOL/L
CO2 SERPL-SCNC: 27 MMOL/L
COLOR: NORMAL
CREAT SERPL-MCNC: 1.3 MG/DL
CREAT SERPL-MCNC: 1.32 MG/DL
EOSINOPHIL # BLD AUTO: 0.3 K/UL
EOSINOPHIL NFR BLD AUTO: 3.8 %
GLUCOSE QUALITATIVE U: NEGATIVE
GLUCOSE SERPL-MCNC: 100 MG/DL
GLUCOSE SERPL-MCNC: 92 MG/DL
HCT VFR BLD CALC: 46.9 %
HDLC SERPL-MCNC: 44 MG/DL
HGB BLD-MCNC: 15.3 G/DL
HYALINE CASTS: 0 /LPF
IMM GRANULOCYTES NFR BLD AUTO: 0.3 %
KETONES URINE: NEGATIVE
LDLC SERPL CALC-MCNC: 90 MG/DL
LDLC SERPL DIRECT ASSAY-MCNC: 96 MG/DL
LEUKOCYTE ESTERASE URINE: NEGATIVE
LYMPHOCYTES # BLD AUTO: 2.04 K/UL
LYMPHOCYTES NFR BLD AUTO: 25.7 %
MAGNESIUM SERPL-MCNC: 2.1 MG/DL
MAN DIFF?: NORMAL
MCHC RBC-ENTMCNC: 31.7 PG
MCHC RBC-ENTMCNC: 32.6 GM/DL
MCV RBC AUTO: 97.1 FL
MICROSCOPIC-UA: NORMAL
MONOCYTES # BLD AUTO: 0.81 K/UL
MONOCYTES NFR BLD AUTO: 10.2 %
NEUTROPHILS # BLD AUTO: 4.73 K/UL
NEUTROPHILS NFR BLD AUTO: 59.5 %
NITRITE URINE: NEGATIVE
NONHDLC SERPL-MCNC: 123 MG/DL
NT-PROBNP SERPL-MCNC: 116 PG/ML
PH URINE: 6
PLATELET # BLD AUTO: 207 K/UL
POTASSIUM SERPL-SCNC: 4.2 MMOL/L
POTASSIUM SERPL-SCNC: 4.3 MMOL/L
PROT SERPL-MCNC: 7.4 G/DL
PROTEIN URINE: NEGATIVE
RBC # BLD: 4.83 M/UL
RBC # FLD: 14.7 %
RED BLOOD CELLS URINE: 1 /HPF
SODIUM SERPL-SCNC: 138 MMOL/L
SODIUM SERPL-SCNC: 141 MMOL/L
SPECIFIC GRAVITY URINE: 1.01
SQUAMOUS EPITHELIAL CELLS: 1 /HPF
TRIGL SERPL-MCNC: 165 MG/DL
URATE SERPL-MCNC: 7.8 MG/DL
UROBILINOGEN URINE: NORMAL
WBC # FLD AUTO: 7.94 K/UL
WHITE BLOOD CELLS URINE: 2 /HPF

## 2021-08-03 ENCOUNTER — APPOINTMENT (OUTPATIENT)
Dept: MRI IMAGING | Facility: HOSPITAL | Age: 76
End: 2021-08-03
Payer: MEDICARE

## 2021-08-03 ENCOUNTER — OUTPATIENT (OUTPATIENT)
Dept: OUTPATIENT SERVICES | Facility: HOSPITAL | Age: 76
LOS: 1 days | End: 2021-08-03
Payer: MEDICARE

## 2021-08-03 DIAGNOSIS — R93.89 ABNORMAL FINDINGS ON DIAGNOSTIC IMAGING OF OTHER SPECIFIED BODY STRUCTURES: ICD-10-CM

## 2021-08-03 DIAGNOSIS — Z98.890 OTHER SPECIFIED POSTPROCEDURAL STATES: Chronic | ICD-10-CM

## 2021-08-03 DIAGNOSIS — Z96.653 PRESENCE OF ARTIFICIAL KNEE JOINT, BILATERAL: Chronic | ICD-10-CM

## 2021-08-03 DIAGNOSIS — Z96.60 PRESENCE OF UNSPECIFIED ORTHOPEDIC JOINT IMPLANT: Chronic | ICD-10-CM

## 2021-08-03 PROCEDURE — 74183 MRI ABD W/O CNTR FLWD CNTR: CPT | Mod: 26,MH

## 2021-08-03 PROCEDURE — 74183 MRI ABD W/O CNTR FLWD CNTR: CPT

## 2021-08-05 ENCOUNTER — APPOINTMENT (OUTPATIENT)
Dept: CARDIOLOGY | Facility: CLINIC | Age: 76
End: 2021-08-05
Payer: MEDICARE

## 2021-08-05 VITALS
WEIGHT: 311 LBS | TEMPERATURE: 98.5 F | DIASTOLIC BLOOD PRESSURE: 60 MMHG | SYSTOLIC BLOOD PRESSURE: 136 MMHG | OXYGEN SATURATION: 85 % | HEIGHT: 74 IN | BODY MASS INDEX: 39.91 KG/M2 | HEART RATE: 85 BPM

## 2021-08-05 DIAGNOSIS — G47.33 OBSTRUCTIVE SLEEP APNEA (ADULT) (PEDIATRIC): ICD-10-CM

## 2021-08-05 DIAGNOSIS — R93.5 ABNORMAL FINDINGS ON DIAGNOSTIC IMAGING OF OTHER ABDOMINAL REGIONS, INCLUDING RETROPERITONEUM: ICD-10-CM

## 2021-08-05 PROCEDURE — 99213 OFFICE O/P EST LOW 20 MIN: CPT

## 2021-08-05 NOTE — PHYSICAL EXAM

## 2021-08-05 NOTE — HISTORY OF PRESENT ILLNESS
[FreeTextEntry1] : This is a 76 year old gentlemen who presents today to discuss an abnormality found on a MRI Abdomen. Patient was found to have a 7 mm lesion in the left lobe of the liver concerning for liver metastasis. Patient had a Cologuard  which was NEGATIVE. Patient is currently taking increased dosage of torsemide 40 mg BID and states that there is minimal improvement. He has not yet been to the vascular doctor. Patient did not follow up with Pulmonary for DIOGO evaluation.

## 2021-08-09 ENCOUNTER — NON-APPOINTMENT (OUTPATIENT)
Age: 76
End: 2021-08-09

## 2021-08-09 ENCOUNTER — APPOINTMENT (OUTPATIENT)
Dept: CARDIOLOGY | Facility: CLINIC | Age: 76
End: 2021-08-09

## 2021-08-09 DIAGNOSIS — Z00.00 ENCOUNTER FOR GENERAL ADULT MEDICAL EXAMINATION W/OUT ABNORMAL FINDINGS: ICD-10-CM

## 2021-08-11 LAB
ALBUMIN SERPL ELPH-MCNC: 4.6 G/DL
ALP BLD-CCNC: 63 U/L
ALT SERPL-CCNC: 47 U/L
ANION GAP SERPL CALC-SCNC: 17 MMOL/L
ANION GAP SERPL CALC-SCNC: 18 MMOL/L
APTT BLD: 33.4 SEC
AST SERPL-CCNC: 27 U/L
BASOPHILS # BLD AUTO: 0.04 K/UL
BASOPHILS NFR BLD AUTO: 0.5 %
BILIRUB SERPL-MCNC: 0.5 MG/DL
BUN SERPL-MCNC: 32 MG/DL
BUN SERPL-MCNC: 33 MG/DL
CALCIUM SERPL-MCNC: 10 MG/DL
CALCIUM SERPL-MCNC: 10 MG/DL
CHLORIDE SERPL-SCNC: 99 MMOL/L
CHLORIDE SERPL-SCNC: 99 MMOL/L
CO2 SERPL-SCNC: 24 MMOL/L
CO2 SERPL-SCNC: 24 MMOL/L
CREAT SERPL-MCNC: 1.42 MG/DL
CREAT SERPL-MCNC: 1.44 MG/DL
EOSINOPHIL # BLD AUTO: 0.22 K/UL
EOSINOPHIL NFR BLD AUTO: 2.8 %
GLUCOSE SERPL-MCNC: 97 MG/DL
GLUCOSE SERPL-MCNC: 98 MG/DL
HCT VFR BLD CALC: 45.2 %
HGB BLD-MCNC: 14.9 G/DL
IMM GRANULOCYTES NFR BLD AUTO: 0.4 %
INR PPP: 0.99 RATIO
LYMPHOCYTES # BLD AUTO: 1.82 K/UL
LYMPHOCYTES NFR BLD AUTO: 23 %
MAN DIFF?: NORMAL
MCHC RBC-ENTMCNC: 31.5 PG
MCHC RBC-ENTMCNC: 33 GM/DL
MCV RBC AUTO: 95.6 FL
MONOCYTES # BLD AUTO: 0.92 K/UL
MONOCYTES NFR BLD AUTO: 11.6 %
NEUTROPHILS # BLD AUTO: 4.9 K/UL
NEUTROPHILS NFR BLD AUTO: 61.7 %
PLATELET # BLD AUTO: 205 K/UL
POTASSIUM SERPL-SCNC: 3.7 MMOL/L
POTASSIUM SERPL-SCNC: 3.7 MMOL/L
PROT SERPL-MCNC: 7.1 G/DL
PT BLD: 11.7 SEC
RBC # BLD: 4.73 M/UL
RBC # FLD: 14.8 %
SODIUM SERPL-SCNC: 140 MMOL/L
SODIUM SERPL-SCNC: 140 MMOL/L
WBC # FLD AUTO: 7.93 K/UL

## 2021-08-12 LAB — SARS-COV-2 N GENE NPH QL NAA+PROBE: NOT DETECTED

## 2021-08-13 ENCOUNTER — APPOINTMENT (OUTPATIENT)
Dept: NUCLEAR MEDICINE | Facility: IMAGING CENTER | Age: 76
End: 2021-08-13
Payer: MEDICARE

## 2021-08-13 ENCOUNTER — OUTPATIENT (OUTPATIENT)
Dept: OUTPATIENT SERVICES | Facility: HOSPITAL | Age: 76
LOS: 1 days | End: 2021-08-13
Payer: MEDICARE

## 2021-08-13 DIAGNOSIS — Z96.60 PRESENCE OF UNSPECIFIED ORTHOPEDIC JOINT IMPLANT: Chronic | ICD-10-CM

## 2021-08-13 DIAGNOSIS — Z96.653 PRESENCE OF ARTIFICIAL KNEE JOINT, BILATERAL: Chronic | ICD-10-CM

## 2021-08-13 DIAGNOSIS — Z98.890 OTHER SPECIFIED POSTPROCEDURAL STATES: Chronic | ICD-10-CM

## 2021-08-13 DIAGNOSIS — R93.5 ABNORMAL FINDINGS ON DIAGNOSTIC IMAGING OF OTHER ABDOMINAL REGIONS, INCLUDING RETROPERITONEUM: ICD-10-CM

## 2021-08-13 PROCEDURE — A9552: CPT

## 2021-08-13 PROCEDURE — 78815 PET IMAGE W/CT SKULL-THIGH: CPT | Mod: 26,PI,MH

## 2021-08-13 PROCEDURE — 78815 PET IMAGE W/CT SKULL-THIGH: CPT

## 2021-08-17 ENCOUNTER — RESULT REVIEW (OUTPATIENT)
Age: 76
End: 2021-08-17

## 2021-08-17 ENCOUNTER — OUTPATIENT (OUTPATIENT)
Dept: OUTPATIENT SERVICES | Facility: HOSPITAL | Age: 76
LOS: 1 days | End: 2021-08-17
Payer: MEDICARE

## 2021-08-17 ENCOUNTER — APPOINTMENT (OUTPATIENT)
Dept: ULTRASOUND IMAGING | Facility: IMAGING CENTER | Age: 76
End: 2021-08-17
Payer: MEDICARE

## 2021-08-17 DIAGNOSIS — Z96.60 PRESENCE OF UNSPECIFIED ORTHOPEDIC JOINT IMPLANT: Chronic | ICD-10-CM

## 2021-08-17 DIAGNOSIS — Z98.890 OTHER SPECIFIED POSTPROCEDURAL STATES: Chronic | ICD-10-CM

## 2021-08-17 DIAGNOSIS — Z96.653 PRESENCE OF ARTIFICIAL KNEE JOINT, BILATERAL: Chronic | ICD-10-CM

## 2021-08-17 DIAGNOSIS — R93.5 ABNORMAL FINDINGS ON DIAGNOSTIC IMAGING OF OTHER ABDOMINAL REGIONS, INCLUDING RETROPERITONEUM: ICD-10-CM

## 2021-08-17 PROCEDURE — 47000 NEEDLE BIOPSY OF LIVER PERQ: CPT

## 2021-08-17 PROCEDURE — 88341 IMHCHEM/IMCYTCHM EA ADD ANTB: CPT

## 2021-08-17 PROCEDURE — 76942 ECHO GUIDE FOR BIOPSY: CPT | Mod: 26

## 2021-08-17 PROCEDURE — 88341 IMHCHEM/IMCYTCHM EA ADD ANTB: CPT | Mod: 26,59

## 2021-08-17 PROCEDURE — 88307 TISSUE EXAM BY PATHOLOGIST: CPT | Mod: 26

## 2021-08-17 PROCEDURE — 88342 IMHCHEM/IMCYTCHM 1ST ANTB: CPT | Mod: 26,59

## 2021-08-17 PROCEDURE — 88360 TUMOR IMMUNOHISTOCHEM/MANUAL: CPT | Mod: 26

## 2021-08-17 PROCEDURE — 88342 IMHCHEM/IMCYTCHM 1ST ANTB: CPT

## 2021-08-17 PROCEDURE — 88360 TUMOR IMMUNOHISTOCHEM/MANUAL: CPT

## 2021-08-17 PROCEDURE — 88307 TISSUE EXAM BY PATHOLOGIST: CPT

## 2021-08-17 PROCEDURE — 76942 ECHO GUIDE FOR BIOPSY: CPT

## 2021-08-23 ENCOUNTER — NON-APPOINTMENT (OUTPATIENT)
Age: 76
End: 2021-08-23

## 2021-08-23 LAB — SURGICAL PATHOLOGY STUDY: SIGNIFICANT CHANGE UP

## 2021-08-25 ENCOUNTER — OUTPATIENT (OUTPATIENT)
Dept: OUTPATIENT SERVICES | Facility: HOSPITAL | Age: 76
LOS: 1 days | Discharge: HOME | End: 2021-08-25

## 2021-08-25 DIAGNOSIS — Z96.653 PRESENCE OF ARTIFICIAL KNEE JOINT, BILATERAL: Chronic | ICD-10-CM

## 2021-08-25 DIAGNOSIS — Z98.890 OTHER SPECIFIED POSTPROCEDURAL STATES: Chronic | ICD-10-CM

## 2021-08-25 DIAGNOSIS — Z96.60 PRESENCE OF UNSPECIFIED ORTHOPEDIC JOINT IMPLANT: Chronic | ICD-10-CM

## 2021-08-25 DIAGNOSIS — C7A.8 OTHER MALIGNANT NEUROENDOCRINE TUMORS: ICD-10-CM

## 2021-08-26 ENCOUNTER — APPOINTMENT (OUTPATIENT)
Dept: HEMATOLOGY ONCOLOGY | Facility: CLINIC | Age: 76
End: 2021-08-26
Payer: MEDICARE

## 2021-08-26 VITALS
DIASTOLIC BLOOD PRESSURE: 66 MMHG | OXYGEN SATURATION: 95 % | BODY MASS INDEX: 42.9 KG/M2 | HEART RATE: 90 BPM | HEIGHT: 70.87 IN | TEMPERATURE: 97.3 F | RESPIRATION RATE: 18 BRPM | SYSTOLIC BLOOD PRESSURE: 120 MMHG | WEIGHT: 306.44 LBS

## 2021-08-26 DIAGNOSIS — Z82.0 FAMILY HISTORY OF EPILEPSY AND OTHER DISEASES OF THE NERVOUS SYSTEM: ICD-10-CM

## 2021-08-26 DIAGNOSIS — Z78.9 OTHER SPECIFIED HEALTH STATUS: ICD-10-CM

## 2021-08-26 PROCEDURE — 99205 OFFICE O/P NEW HI 60 MIN: CPT

## 2021-08-26 RX ORDER — AZITHROMYCIN 250 MG/1
250 TABLET, FILM COATED ORAL
Qty: 1 | Refills: 0 | Status: DISCONTINUED | COMMUNITY
Start: 2020-03-23 | End: 2021-08-26

## 2021-08-26 NOTE — PHYSICAL EXAM
[Normal] : affect appropriate [Restricted in physically strenuous activity but ambulatory and able to carry out work of a light or sedentary nature] : Status 1- Restricted in physically strenuous activity but ambulatory and able to carry out work of a light or sedentary nature, e.g., light house work, office work [Obese] : obese [de-identified] : uses cane for balance and weakness [de-identified] : obese, left anterior incision for prior back surgery. Liver is not palpable. [de-identified] : 2+ bilateral ankle edema [de-identified] : bilateral anterior knee incisions from prior knee replacements [de-identified] : thoracic and lumbar spine incision from 4 prior back surgeries with hardware

## 2021-08-26 NOTE — REASON FOR VISIT
[Initial Consultation] : an initial consultation [Spouse] : spouse [FreeTextEntry2] : neuroendocrine CA

## 2021-08-26 NOTE — REVIEW OF SYSTEMS
[Negative] : Allergic/Immunologic [FreeTextEntry9] : weakness and pain in back due to prior surgeries. [de-identified] : ankle swelling

## 2021-08-26 NOTE — HISTORY OF PRESENT ILLNESS
[Disease: _____________________] : Disease: [unfilled] [de-identified] : Mrs. Espinal is a 76 year old female with HTN, HLD presenting to the office for an initial consultation of metastatic neuroendocrine CA.\par \par Patient underwent CT chest on 7/20/2021 for dilated aorta which revealed unchanged dilated 4.4 cm ascending aorta.\par Fatty liver and superimposed new nodules versus nodular sparing. Further evaluation with contrast-enhanced CT or MRI is recommended\par \par MRI abdomen on 8/3/2021: Numerous hyperenhancing liver metastases. Correlate clinically with history of neuroendocrine tumor or melanoma. A 7 mm lesion in the left lobe anteriorly is amenable to CT-guided biopsy for definitive tissue characterization. Results discussed with Dr. Hameed at time of interpretation.\par \par FDG-PET/CT 8/13/2021: No evidence of FDG-avid disease.\par Nonvisualization of hyperenhancing hepatic lesions seen on MRI dated 8/3/2021. Differential diagnosis includes metastatic well-differentiated neuroendocrine tumor which may not be FDG-avid. Histologic correlation is recommended. DOTATATE-PET/CT may be performed if there is biopsy confirmation of neuroendocrine tumor.\par \par US guided liver bx on 8/17/2021 revealed metastatic well differentiated metastatic neuroendocrine CA.\par Ki index ~ 20% [de-identified] : well differentiated neuroendocrine tumor [de-identified] : Internist/Cardiology: Agustin Hameed\par \par Yamilet - wife: [phone]\par Holly - patient : [phone]

## 2021-08-30 ENCOUNTER — RESULT REVIEW (OUTPATIENT)
Age: 76
End: 2021-08-30

## 2021-08-30 ENCOUNTER — APPOINTMENT (OUTPATIENT)
Dept: HEMATOLOGY ONCOLOGY | Facility: CLINIC | Age: 76
End: 2021-08-30

## 2021-08-30 LAB
25(OH)D3 SERPL-MCNC: 49.9 NG/ML
BASOPHILS # BLD AUTO: 0.04 K/UL — SIGNIFICANT CHANGE UP (ref 0–0.2)
BASOPHILS NFR BLD AUTO: 0.5 % — SIGNIFICANT CHANGE UP (ref 0–2)
EOSINOPHIL # BLD AUTO: 0.23 K/UL — SIGNIFICANT CHANGE UP (ref 0–0.5)
EOSINOPHIL NFR BLD AUTO: 3.1 % — SIGNIFICANT CHANGE UP (ref 0–6)
HCT VFR BLD CALC: 43.1 % — SIGNIFICANT CHANGE UP (ref 39–50)
HGB BLD-MCNC: 14.7 G/DL — SIGNIFICANT CHANGE UP (ref 13–17)
IMM GRANULOCYTES NFR BLD AUTO: 0.4 % — SIGNIFICANT CHANGE UP (ref 0–1.5)
LYMPHOCYTES # BLD AUTO: 1.57 K/UL — SIGNIFICANT CHANGE UP (ref 1–3.3)
LYMPHOCYTES # BLD AUTO: 21.5 % — SIGNIFICANT CHANGE UP (ref 13–44)
MCHC RBC-ENTMCNC: 32.2 PG — SIGNIFICANT CHANGE UP (ref 27–34)
MCHC RBC-ENTMCNC: 34.1 G/DL — SIGNIFICANT CHANGE UP (ref 32–36)
MCV RBC AUTO: 94.3 FL — SIGNIFICANT CHANGE UP (ref 80–100)
MONOCYTES # BLD AUTO: 0.7 K/UL — SIGNIFICANT CHANGE UP (ref 0–0.9)
MONOCYTES NFR BLD AUTO: 9.6 % — SIGNIFICANT CHANGE UP (ref 2–14)
NEUTROPHILS # BLD AUTO: 4.74 K/UL — SIGNIFICANT CHANGE UP (ref 1.8–7.4)
NEUTROPHILS NFR BLD AUTO: 64.9 % — SIGNIFICANT CHANGE UP (ref 43–77)
NRBC # BLD: 0 /100 WBCS — SIGNIFICANT CHANGE UP (ref 0–0)
PLATELET # BLD AUTO: 189 K/UL — SIGNIFICANT CHANGE UP (ref 150–400)
RBC # BLD: 4.57 M/UL — SIGNIFICANT CHANGE UP (ref 4.2–5.8)
RBC # FLD: 14.6 % — HIGH (ref 10.3–14.5)
WBC # BLD: 7.31 K/UL — SIGNIFICANT CHANGE UP (ref 3.8–10.5)
WBC # FLD AUTO: 7.31 K/UL — SIGNIFICANT CHANGE UP (ref 3.8–10.5)

## 2021-08-31 LAB
ALBUMIN SERPL ELPH-MCNC: 4.6 G/DL
ALP BLD-CCNC: 63 U/L
ALT SERPL-CCNC: 50 U/L
ANION GAP SERPL CALC-SCNC: 16 MMOL/L
AST SERPL-CCNC: 34 U/L
BILIRUB SERPL-MCNC: 0.4 MG/DL
BUN SERPL-MCNC: 28 MG/DL
CALCIUM SERPL-MCNC: 9.2 MG/DL
CHLORIDE SERPL-SCNC: 101 MMOL/L
CO2 SERPL-SCNC: 24 MMOL/L
CREAT SERPL-MCNC: 1.31 MG/DL
CRP SERPL-MCNC: <3 MG/L
GLUCOSE SERPL-MCNC: 117 MG/DL
HBV SURFACE AB SER QL: REACTIVE
HBV SURFACE AG SER QL: NONREACTIVE
HCV AB SER QL: NONREACTIVE
HCV S/CO RATIO: 0.1 S/CO
LDH SERPL-CCNC: 206 U/L
POTASSIUM SERPL-SCNC: 4.1 MMOL/L
PROT SERPL-MCNC: 6.8 G/DL
SODIUM SERPL-SCNC: 141 MMOL/L

## 2021-09-01 LAB — GASTRIN SERPL-MCNC: 26 PG/ML

## 2021-09-02 LAB
5OH-INDOLEACETATE 24H UR-MRATE: 3.5 MG/24 H
5OH-INDOLEACETATE UR-MCNC: 1.65 G/24 H
CGA SERPL-MCNC: 70.9 NG/ML
SEROTONIN SERUM: 49 NG/ML
SPECIMEN VOL 24H UR: 2900 ML

## 2021-09-03 ENCOUNTER — APPOINTMENT (OUTPATIENT)
Dept: NUCLEAR MEDICINE | Facility: IMAGING CENTER | Age: 76
End: 2021-09-03
Payer: MEDICARE

## 2021-09-03 ENCOUNTER — OUTPATIENT (OUTPATIENT)
Dept: OUTPATIENT SERVICES | Facility: HOSPITAL | Age: 76
LOS: 1 days | End: 2021-09-03
Payer: MEDICARE

## 2021-09-03 DIAGNOSIS — Z98.890 OTHER SPECIFIED POSTPROCEDURAL STATES: Chronic | ICD-10-CM

## 2021-09-03 DIAGNOSIS — Z96.653 PRESENCE OF ARTIFICIAL KNEE JOINT, BILATERAL: Chronic | ICD-10-CM

## 2021-09-03 DIAGNOSIS — C7A.8 OTHER MALIGNANT NEUROENDOCRINE TUMORS: ICD-10-CM

## 2021-09-03 DIAGNOSIS — Z96.60 PRESENCE OF UNSPECIFIED ORTHOPEDIC JOINT IMPLANT: Chronic | ICD-10-CM

## 2021-09-03 PROCEDURE — 78815 PET IMAGE W/CT SKULL-THIGH: CPT

## 2021-09-03 PROCEDURE — A9592: CPT

## 2021-09-03 PROCEDURE — 78815 PET IMAGE W/CT SKULL-THIGH: CPT | Mod: 26,PI,MH

## 2021-09-07 LAB
GLUCAGON SERPL-MCNC: 37 PG/ML
GLUCGNRR: NORMAL

## 2021-09-10 LAB
PANC POLYPEPT SERPL-MCNC: 2895 PG/ML
VIP SERPL-MCNC: <50 PG/ML

## 2021-09-15 ENCOUNTER — NON-APPOINTMENT (OUTPATIENT)
Age: 76
End: 2021-09-15

## 2021-09-17 ENCOUNTER — APPOINTMENT (OUTPATIENT)
Dept: WOUND CARE | Facility: CLINIC | Age: 76
End: 2021-09-17
Payer: MEDICARE

## 2021-09-17 VITALS
BODY MASS INDEX: 38.5 KG/M2 | HEIGHT: 74 IN | SYSTOLIC BLOOD PRESSURE: 97 MMHG | DIASTOLIC BLOOD PRESSURE: 60 MMHG | HEART RATE: 73 BPM | RESPIRATION RATE: 16 BRPM | WEIGHT: 300 LBS

## 2021-09-17 DIAGNOSIS — R26.9 UNSPECIFIED ABNORMALITIES OF GAIT AND MOBILITY: ICD-10-CM

## 2021-09-17 DIAGNOSIS — K43.9 VENTRAL HERNIA W/OUT OBSTRUCTION OR GANGRENE: ICD-10-CM

## 2021-09-17 DIAGNOSIS — Z82.49 FAMILY HISTORY OF ISCHEMIC HEART DISEASE AND OTHER DISEASES OF THE CIRCULATORY SYSTEM: ICD-10-CM

## 2021-09-17 DIAGNOSIS — Z98.890 OTHER SPECIFIED POSTPROCEDURAL STATES: ICD-10-CM

## 2021-09-17 DIAGNOSIS — B37.9 CANDIDIASIS, UNSPECIFIED: ICD-10-CM

## 2021-09-17 PROCEDURE — 99204 OFFICE O/P NEW MOD 45 MIN: CPT

## 2021-09-17 RX ORDER — CLOTRIMAZOLE 10 MG/G
1 CREAM TOPICAL
Qty: 30 | Refills: 0 | Status: DISCONTINUED | COMMUNITY
Start: 2021-09-15

## 2021-09-17 RX ORDER — CEPHALEXIN 500 MG/1
500 CAPSULE ORAL
Qty: 20 | Refills: 0 | Status: DISCONTINUED | COMMUNITY
Start: 2021-08-17

## 2021-09-17 RX ORDER — LOSARTAN POTASSIUM 50 MG/1
50 TABLET, FILM COATED ORAL
Qty: 90 | Refills: 3 | Status: DISCONTINUED | COMMUNITY
End: 2021-09-17

## 2021-09-17 RX ORDER — POTASSIUM CHLORIDE 1500 MG/1
20 TABLET, EXTENDED RELEASE ORAL
Qty: 90 | Refills: 0 | Status: DISCONTINUED | COMMUNITY
Start: 2021-05-26

## 2021-09-20 PROBLEM — K43.9 ABDOMINAL WALL HERNIA: Status: ACTIVE | Noted: 2020-03-19

## 2021-09-20 PROBLEM — Z82.49 FAMILY HISTORY OF MYOCARDIAL INFARCTION: Status: ACTIVE | Noted: 2021-08-26

## 2021-09-20 NOTE — HISTORY OF PRESENT ILLNESS
[FreeTextEntry1] : Mr. NANDO HERNANDEZ   presents to the office with a wound since 9/7/2021. \par The wound is located on  the sacrum.\par  The patient has complaints of pain and small amounts of drainage.  \par The patient has been dressing the wound with nystatin-triamcinolone cream. \par  The patient denies fevers or chills. The patient has localized pain to the wound upon dressing changes. The patient has no other complaints or associated symptoms.\par on keflex for right ankle swelling now has yeast infection\par

## 2021-09-20 NOTE — ASSESSMENT
[FreeTextEntry1] : 76 yr old male with lumbar radiculaopathy, candidiasis, limited mobility\par  s/p antibiotics for right ankle swelling cellulitis\par probiotics, nystatin ordered\par moisture barrier\par offload area\par discussed nutritional support\par  datacomplexity-mod- lab, xr, old rec, test resultsreview,, visualize imagecptreview previous\par risk-mod surgery\par

## 2021-09-20 NOTE — PHYSICAL EXAM
[Please See PDF for [employer]] : Please See PDF for [employer]. [Normal Breath Sounds] : Normal breath sounds [JVD] : no jugular venous distention  [Normal Rate and Rhythm] : normal rate and rhythm [2+] : left 2+ [Ankle Swelling (On Exam)] : not present [] : present [Abdomen Tenderness] : ~T ~M No abdominal tenderness [Skin Ulcer] : ulcer [Skin Induration] : induration [Alert] : alert [Oriented to Person] : oriented to person [Oriented to Place] : oriented to place [Oriented to Time] : oriented to time [de-identified] : nad [de-identified] : sabrina [de-identified] : scar lami well healed [de-identified] : 34 anle 49 left

## 2021-09-23 ENCOUNTER — APPOINTMENT (OUTPATIENT)
Dept: HEMATOLOGY ONCOLOGY | Facility: CLINIC | Age: 76
End: 2021-09-23
Payer: MEDICARE

## 2021-09-23 VITALS
DIASTOLIC BLOOD PRESSURE: 62 MMHG | HEART RATE: 78 BPM | OXYGEN SATURATION: 97 % | TEMPERATURE: 97.7 F | RESPIRATION RATE: 16 BRPM | WEIGHT: 303.36 LBS | SYSTOLIC BLOOD PRESSURE: 97 MMHG | BODY MASS INDEX: 38.95 KG/M2

## 2021-09-23 PROCEDURE — 99214 OFFICE O/P EST MOD 30 MIN: CPT

## 2021-09-23 NOTE — HISTORY OF PRESENT ILLNESS
[Disease: _____________________] : Disease: [unfilled] [de-identified] : Mrs. Espinal is a 76 year old female with HTN, HLD presenting to the office for an initial consultation of metastatic neuroendocrine CA.\par \par Patient underwent CT chest on 7/20/2021 for dilated aorta which revealed unchanged dilated 4.4 cm ascending aorta.\par Fatty liver and superimposed new nodules versus nodular sparing. Further evaluation with contrast-enhanced CT or MRI is recommended\par \par MRI abdomen on 8/3/2021: Numerous hyperenhancing liver metastases. Correlate clinically with history of neuroendocrine tumor or melanoma. A 7 mm lesion in the left lobe anteriorly is amenable to CT-guided biopsy for definitive tissue characterization. Results discussed with Dr. Hameed at time of interpretation.\par \par FDG-PET/CT 8/13/2021: No evidence of FDG-avid disease.\par Nonvisualization of hyperenhancing hepatic lesions seen on MRI dated 8/3/2021. Differential diagnosis includes metastatic well-differentiated neuroendocrine tumor which may not be FDG-avid. Histologic correlation is recommended. DOTATATE-PET/CT may be performed if there is biopsy confirmation of neuroendocrine tumor.\par \par US guided liver bx on 8/17/2021 revealed metastatic well differentiated metastatic neuroendocrine CA.\par Ki index ~ 20%\par \par 9/23/21\par PET DOTATATE reviewed. \par there is a pancreatic primary.\par Pancreatic polypeptide is elevated > 2000\par All other functional biochemicals are negative.\par He has no symtoms from the disease.\par We discussed and review patient information regarding lanreotide.\par He signed a consent.\par will arrange to start lanreotide 120 mg q 4 weeks [de-identified] : well differentiated neuroendocrine tumor [de-identified] : Internist/Cardiology: Agustin Hameed\par \par Yamilet - wife: [phone]\par Holly - patient : [phone]

## 2021-09-23 NOTE — PHYSICAL EXAM
[Restricted in physically strenuous activity but ambulatory and able to carry out work of a light or sedentary nature] : Status 1- Restricted in physically strenuous activity but ambulatory and able to carry out work of a light or sedentary nature, e.g., light house work, office work [Obese] : obese [Normal] : affect appropriate [de-identified] : uses cane for balance and weakness [de-identified] : obese, left anterior incision for prior back surgery. Liver is not palpable. [de-identified] : 2+ bilateral ankle edema [de-identified] : thoracic and lumbar spine incision from 4 prior back surgeries with hardware [de-identified] : bilateral anterior knee incisions from prior knee replacements

## 2021-09-23 NOTE — REVIEW OF SYSTEMS
[Negative] : Allergic/Immunologic [FreeTextEntry9] : weakness and pain in back due to prior surgeries. [de-identified] : ankle swelling

## 2021-09-24 ENCOUNTER — OUTPATIENT (OUTPATIENT)
Dept: OUTPATIENT SERVICES | Facility: HOSPITAL | Age: 76
LOS: 1 days | Discharge: HOME | End: 2021-09-24

## 2021-09-24 DIAGNOSIS — C7A.8 OTHER MALIGNANT NEUROENDOCRINE TUMORS: ICD-10-CM

## 2021-09-24 DIAGNOSIS — Z98.890 OTHER SPECIFIED POSTPROCEDURAL STATES: Chronic | ICD-10-CM

## 2021-09-24 DIAGNOSIS — Z96.653 PRESENCE OF ARTIFICIAL KNEE JOINT, BILATERAL: Chronic | ICD-10-CM

## 2021-09-24 DIAGNOSIS — Z96.60 PRESENCE OF UNSPECIFIED ORTHOPEDIC JOINT IMPLANT: Chronic | ICD-10-CM

## 2021-09-28 ENCOUNTER — APPOINTMENT (OUTPATIENT)
Dept: INFUSION THERAPY | Facility: HOSPITAL | Age: 76
End: 2021-09-28

## 2021-09-29 ENCOUNTER — NON-APPOINTMENT (OUTPATIENT)
Age: 76
End: 2021-09-29

## 2021-10-06 ENCOUNTER — NON-APPOINTMENT (OUTPATIENT)
Age: 76
End: 2021-10-06

## 2021-10-08 ENCOUNTER — NON-APPOINTMENT (OUTPATIENT)
Age: 76
End: 2021-10-08

## 2021-10-08 ENCOUNTER — INPATIENT (INPATIENT)
Facility: HOSPITAL | Age: 76
LOS: 3 days | Discharge: HOME | DRG: 683 | End: 2021-10-12
Attending: HOSPITALIST | Admitting: STUDENT IN AN ORGANIZED HEALTH CARE EDUCATION/TRAINING PROGRAM
Payer: MEDICARE

## 2021-10-08 VITALS
RESPIRATION RATE: 20 BRPM | SYSTOLIC BLOOD PRESSURE: 81 MMHG | TEMPERATURE: 98 F | DIASTOLIC BLOOD PRESSURE: 47 MMHG | OXYGEN SATURATION: 96 % | HEIGHT: 74 IN | WEIGHT: 300.05 LBS | HEART RATE: 88 BPM

## 2021-10-08 DIAGNOSIS — R77.8 OTHER SPECIFIED ABNORMALITIES OF PLASMA PROTEINS: ICD-10-CM

## 2021-10-08 DIAGNOSIS — Z96.653 PRESENCE OF ARTIFICIAL KNEE JOINT, BILATERAL: Chronic | ICD-10-CM

## 2021-10-08 DIAGNOSIS — Z98.890 OTHER SPECIFIED POSTPROCEDURAL STATES: Chronic | ICD-10-CM

## 2021-10-08 DIAGNOSIS — C7A.8 OTHER MALIGNANT NEUROENDOCRINE TUMORS: ICD-10-CM

## 2021-10-08 DIAGNOSIS — I95.9 HYPOTENSION, UNSPECIFIED: ICD-10-CM

## 2021-10-08 DIAGNOSIS — N17.9 ACUTE KIDNEY FAILURE, UNSPECIFIED: ICD-10-CM

## 2021-10-08 DIAGNOSIS — Z96.60 PRESENCE OF UNSPECIFIED ORTHOPEDIC JOINT IMPLANT: Chronic | ICD-10-CM

## 2021-10-08 DIAGNOSIS — Z29.9 ENCOUNTER FOR PROPHYLACTIC MEASURES, UNSPECIFIED: ICD-10-CM

## 2021-10-08 DIAGNOSIS — E87.2 ACIDOSIS: ICD-10-CM

## 2021-10-08 LAB
ALBUMIN SERPL ELPH-MCNC: 4.4 G/DL — SIGNIFICANT CHANGE UP (ref 3.3–5)
ALBUMIN SERPL ELPH-MCNC: 4.5 G/DL — SIGNIFICANT CHANGE UP (ref 3.3–5)
ALP SERPL-CCNC: 53 U/L — SIGNIFICANT CHANGE UP (ref 40–120)
ALP SERPL-CCNC: 56 U/L — SIGNIFICANT CHANGE UP (ref 40–120)
ALT FLD-CCNC: 22 U/L — SIGNIFICANT CHANGE UP (ref 10–45)
ALT FLD-CCNC: 23 U/L — SIGNIFICANT CHANGE UP (ref 10–45)
ANION GAP SERPL CALC-SCNC: 20 MMOL/L — HIGH (ref 5–17)
ANION GAP SERPL CALC-SCNC: 22 MMOL/L — HIGH (ref 5–17)
APPEARANCE UR: CLEAR — SIGNIFICANT CHANGE UP
APPEARANCE UR: CLEAR — SIGNIFICANT CHANGE UP
APTT BLD: 29 SEC — SIGNIFICANT CHANGE UP (ref 27.5–35.5)
AST SERPL-CCNC: 12 U/L — SIGNIFICANT CHANGE UP (ref 10–40)
AST SERPL-CCNC: 14 U/L — SIGNIFICANT CHANGE UP (ref 10–40)
B PERT DNA SPEC QL NAA+PROBE: SIGNIFICANT CHANGE UP
BACTERIA # UR AUTO: 0 — SIGNIFICANT CHANGE UP
BACTERIA # UR AUTO: NEGATIVE — SIGNIFICANT CHANGE UP
BASE EXCESS BLDV CALC-SCNC: -6.3 MMOL/L — LOW (ref -2–2)
BASE EXCESS BLDV CALC-SCNC: -9.6 MMOL/L — LOW (ref -2–2)
BASOPHILS # BLD AUTO: 0.03 K/UL — SIGNIFICANT CHANGE UP (ref 0–0.2)
BASOPHILS NFR BLD AUTO: 0.4 % — SIGNIFICANT CHANGE UP (ref 0–2)
BILIRUB SERPL-MCNC: 0.3 MG/DL — SIGNIFICANT CHANGE UP (ref 0.2–1.2)
BILIRUB SERPL-MCNC: 0.4 MG/DL — SIGNIFICANT CHANGE UP (ref 0.2–1.2)
BILIRUB UR-MCNC: NEGATIVE — SIGNIFICANT CHANGE UP
BILIRUB UR-MCNC: NEGATIVE — SIGNIFICANT CHANGE UP
BUN SERPL-MCNC: 125 MG/DL — HIGH (ref 7–23)
BUN SERPL-MCNC: 125 MG/DL — HIGH (ref 7–23)
C PNEUM DNA SPEC QL NAA+PROBE: SIGNIFICANT CHANGE UP
CA-I SERPL-SCNC: 1.2 MMOL/L — SIGNIFICANT CHANGE UP (ref 1.15–1.33)
CALCIUM SERPL-MCNC: 9.6 MG/DL — SIGNIFICANT CHANGE UP (ref 8.4–10.5)
CALCIUM SERPL-MCNC: 9.8 MG/DL — SIGNIFICANT CHANGE UP (ref 8.4–10.5)
CHLORIDE BLDV-SCNC: 101 MMOL/L — SIGNIFICANT CHANGE UP (ref 96–108)
CHLORIDE SERPL-SCNC: 103 MMOL/L — SIGNIFICANT CHANGE UP (ref 96–108)
CHLORIDE SERPL-SCNC: 99 MMOL/L — SIGNIFICANT CHANGE UP (ref 96–108)
CO2 BLDV-SCNC: 21 MMOL/L — LOW (ref 22–26)
CO2 BLDV-SCNC: 23 MMOL/L — SIGNIFICANT CHANGE UP (ref 22–26)
CO2 SERPL-SCNC: 16 MMOL/L — LOW (ref 22–31)
CO2 SERPL-SCNC: 17 MMOL/L — LOW (ref 22–31)
COLOR SPEC: COLORLESS — SIGNIFICANT CHANGE UP
COLOR SPEC: SIGNIFICANT CHANGE UP
CREAT ?TM UR-MCNC: 63 MG/DL — SIGNIFICANT CHANGE UP
CREAT SERPL-MCNC: 5.52 MG/DL — HIGH (ref 0.5–1.3)
CREAT SERPL-MCNC: 6.97 MG/DL — HIGH (ref 0.5–1.3)
DIFF PNL FLD: ABNORMAL
DIFF PNL FLD: ABNORMAL
EOSINOPHIL # BLD AUTO: 0.2 K/UL — SIGNIFICANT CHANGE UP (ref 0–0.5)
EOSINOPHIL NFR BLD AUTO: 2.7 % — SIGNIFICANT CHANGE UP (ref 0–6)
EPI CELLS # UR: 0 /HPF — SIGNIFICANT CHANGE UP
EPI CELLS # UR: 2 /HPF — SIGNIFICANT CHANGE UP
FLUAV H1 2009 PAND RNA SPEC QL NAA+PROBE: SIGNIFICANT CHANGE UP
FLUAV H1 RNA SPEC QL NAA+PROBE: SIGNIFICANT CHANGE UP
FLUAV H3 RNA SPEC QL NAA+PROBE: SIGNIFICANT CHANGE UP
FLUAV SUBTYP SPEC NAA+PROBE: SIGNIFICANT CHANGE UP
FLUBV RNA SPEC QL NAA+PROBE: SIGNIFICANT CHANGE UP
GAS PNL BLDV: 132 MMOL/L — LOW (ref 136–145)
GAS PNL BLDV: SIGNIFICANT CHANGE UP
GLUCOSE BLDV-MCNC: 130 MG/DL — HIGH (ref 70–99)
GLUCOSE SERPL-MCNC: 135 MG/DL — HIGH (ref 70–99)
GLUCOSE SERPL-MCNC: 99 MG/DL — SIGNIFICANT CHANGE UP (ref 70–99)
GLUCOSE UR QL: NEGATIVE — SIGNIFICANT CHANGE UP
GLUCOSE UR QL: NEGATIVE — SIGNIFICANT CHANGE UP
HADV DNA SPEC QL NAA+PROBE: SIGNIFICANT CHANGE UP
HCO3 BLDV-SCNC: 19 MMOL/L — LOW (ref 22–29)
HCO3 BLDV-SCNC: 21 MMOL/L — LOW (ref 22–29)
HCOV PNL SPEC NAA+PROBE: SIGNIFICANT CHANGE UP
HCT VFR BLD CALC: 39.1 % — SIGNIFICANT CHANGE UP (ref 39–50)
HCT VFR BLDA CALC: 42 % — SIGNIFICANT CHANGE UP (ref 39–51)
HGB BLD CALC-MCNC: 14.1 G/DL — SIGNIFICANT CHANGE UP (ref 12.6–17.4)
HGB BLD-MCNC: 12.6 G/DL — LOW (ref 13–17)
HMPV RNA SPEC QL NAA+PROBE: SIGNIFICANT CHANGE UP
HPIV1 RNA SPEC QL NAA+PROBE: SIGNIFICANT CHANGE UP
HPIV2 RNA SPEC QL NAA+PROBE: SIGNIFICANT CHANGE UP
HPIV3 RNA SPEC QL NAA+PROBE: SIGNIFICANT CHANGE UP
HPIV4 RNA SPEC QL NAA+PROBE: SIGNIFICANT CHANGE UP
HYALINE CASTS # UR AUTO: 2 /LPF — SIGNIFICANT CHANGE UP (ref 0–2)
HYALINE CASTS # UR AUTO: 3 /LPF — HIGH (ref 0–2)
IMM GRANULOCYTES NFR BLD AUTO: 0.3 % — SIGNIFICANT CHANGE UP (ref 0–1.5)
INR BLD: 0.94 RATIO — SIGNIFICANT CHANGE UP (ref 0.88–1.16)
KETONES UR-MCNC: NEGATIVE — SIGNIFICANT CHANGE UP
KETONES UR-MCNC: NEGATIVE — SIGNIFICANT CHANGE UP
LACTATE BLDV-MCNC: 1.2 MMOL/L — SIGNIFICANT CHANGE UP (ref 0.7–2)
LEUKOCYTE ESTERASE UR-ACNC: ABNORMAL
LEUKOCYTE ESTERASE UR-ACNC: NEGATIVE — SIGNIFICANT CHANGE UP
LYMPHOCYTES # BLD AUTO: 1.39 K/UL — SIGNIFICANT CHANGE UP (ref 1–3.3)
LYMPHOCYTES # BLD AUTO: 19 % — SIGNIFICANT CHANGE UP (ref 13–44)
MAGNESIUM SERPL-MCNC: 2.1 MG/DL — SIGNIFICANT CHANGE UP (ref 1.6–2.6)
MCHC RBC-ENTMCNC: 31.1 PG — SIGNIFICANT CHANGE UP (ref 27–34)
MCHC RBC-ENTMCNC: 32.2 GM/DL — SIGNIFICANT CHANGE UP (ref 32–36)
MCV RBC AUTO: 96.5 FL — SIGNIFICANT CHANGE UP (ref 80–100)
MONOCYTES # BLD AUTO: 0.84 K/UL — SIGNIFICANT CHANGE UP (ref 0–0.9)
MONOCYTES NFR BLD AUTO: 11.5 % — SIGNIFICANT CHANGE UP (ref 2–14)
NEUTROPHILS # BLD AUTO: 4.85 K/UL — SIGNIFICANT CHANGE UP (ref 1.8–7.4)
NEUTROPHILS NFR BLD AUTO: 66.1 % — SIGNIFICANT CHANGE UP (ref 43–77)
NITRITE UR-MCNC: NEGATIVE — SIGNIFICANT CHANGE UP
NITRITE UR-MCNC: NEGATIVE — SIGNIFICANT CHANGE UP
NRBC # BLD: 0 /100 WBCS — SIGNIFICANT CHANGE UP (ref 0–0)
OSMOLALITY UR: 376 MOS/KG — SIGNIFICANT CHANGE UP (ref 300–900)
PCO2 BLDV: 50 MMHG — SIGNIFICANT CHANGE UP (ref 42–55)
PCO2 BLDV: 51 MMHG — SIGNIFICANT CHANGE UP (ref 42–55)
PH BLDV: 7.18 — CRITICAL LOW (ref 7.32–7.43)
PH BLDV: 7.24 — LOW (ref 7.32–7.43)
PH UR: 5 — SIGNIFICANT CHANGE UP (ref 5–8)
PH UR: 5.5 — SIGNIFICANT CHANGE UP (ref 5–8)
PHOSPHATE SERPL-MCNC: 7.7 MG/DL — HIGH (ref 2.5–4.5)
PLATELET # BLD AUTO: 173 K/UL — SIGNIFICANT CHANGE UP (ref 150–400)
PO2 BLDV: 24 MMHG — LOW (ref 25–45)
PO2 BLDV: 31 MMHG — SIGNIFICANT CHANGE UP (ref 25–45)
POTASSIUM BLDV-SCNC: 5.1 MMOL/L — SIGNIFICANT CHANGE UP (ref 3.5–5.1)
POTASSIUM SERPL-MCNC: 4.7 MMOL/L — SIGNIFICANT CHANGE UP (ref 3.5–5.3)
POTASSIUM SERPL-MCNC: 5.2 MMOL/L — SIGNIFICANT CHANGE UP (ref 3.5–5.3)
POTASSIUM SERPL-SCNC: 4.7 MMOL/L — SIGNIFICANT CHANGE UP (ref 3.5–5.3)
POTASSIUM SERPL-SCNC: 5.2 MMOL/L — SIGNIFICANT CHANGE UP (ref 3.5–5.3)
PROT SERPL-MCNC: 7.2 G/DL — SIGNIFICANT CHANGE UP (ref 6–8.3)
PROT SERPL-MCNC: 7.3 G/DL — SIGNIFICANT CHANGE UP (ref 6–8.3)
PROT UR-MCNC: NEGATIVE — SIGNIFICANT CHANGE UP
PROT UR-MCNC: SIGNIFICANT CHANGE UP
PROTHROM AB SERPL-ACNC: 11.3 SEC — SIGNIFICANT CHANGE UP (ref 10.6–13.6)
RAPID RVP RESULT: SIGNIFICANT CHANGE UP
RBC # BLD: 4.05 M/UL — LOW (ref 4.2–5.8)
RBC # FLD: 14.5 % — SIGNIFICANT CHANGE UP (ref 10.3–14.5)
RBC CASTS # UR COMP ASSIST: 1 /HPF — SIGNIFICANT CHANGE UP (ref 0–4)
RBC CASTS # UR COMP ASSIST: 4 /HPF — SIGNIFICANT CHANGE UP (ref 0–4)
RSV RNA SPEC QL NAA+PROBE: SIGNIFICANT CHANGE UP
RV+EV RNA SPEC QL NAA+PROBE: SIGNIFICANT CHANGE UP
SAO2 % BLDV: 35.8 % — LOW (ref 67–88)
SAO2 % BLDV: 47.2 % — LOW (ref 67–88)
SARS-COV-2 RNA SPEC QL NAA+PROBE: SIGNIFICANT CHANGE UP
SODIUM SERPL-SCNC: 138 MMOL/L — SIGNIFICANT CHANGE UP (ref 135–145)
SODIUM SERPL-SCNC: 139 MMOL/L — SIGNIFICANT CHANGE UP (ref 135–145)
SP GR SPEC: 1.01 — LOW (ref 1.01–1.02)
SP GR SPEC: 1.01 — SIGNIFICANT CHANGE UP (ref 1.01–1.02)
TROPONIN T, HIGH SENSITIVITY RESULT: 127 NG/L — HIGH (ref 0–51)
TROPONIN T, HIGH SENSITIVITY RESULT: 136 NG/L — HIGH (ref 0–51)
UROBILINOGEN FLD QL: NEGATIVE — SIGNIFICANT CHANGE UP
UROBILINOGEN FLD QL: NEGATIVE — SIGNIFICANT CHANGE UP
WBC # BLD: 7.33 K/UL — SIGNIFICANT CHANGE UP (ref 3.8–10.5)
WBC # FLD AUTO: 7.33 K/UL — SIGNIFICANT CHANGE UP (ref 3.8–10.5)
WBC UR QL: 1 /HPF — SIGNIFICANT CHANGE UP (ref 0–5)
WBC UR QL: 6 /HPF — HIGH (ref 0–5)

## 2021-10-08 PROCEDURE — 93010 ELECTROCARDIOGRAM REPORT: CPT

## 2021-10-08 PROCEDURE — 99223 1ST HOSP IP/OBS HIGH 75: CPT | Mod: GC

## 2021-10-08 PROCEDURE — 71045 X-RAY EXAM CHEST 1 VIEW: CPT | Mod: 26

## 2021-10-08 PROCEDURE — 71250 CT THORAX DX C-: CPT | Mod: 26,MA

## 2021-10-08 PROCEDURE — 99223 1ST HOSP IP/OBS HIGH 75: CPT

## 2021-10-08 PROCEDURE — 99285 EMERGENCY DEPT VISIT HI MDM: CPT | Mod: GC

## 2021-10-08 PROCEDURE — 74176 CT ABD & PELVIS W/O CONTRAST: CPT | Mod: 26,MA

## 2021-10-08 RX ORDER — ALLOPURINOL 300 MG
1 TABLET ORAL
Qty: 0 | Refills: 0 | DISCHARGE

## 2021-10-08 RX ORDER — SODIUM CHLORIDE 9 MG/ML
1000 INJECTION, SOLUTION INTRAVENOUS
Refills: 0 | Status: DISCONTINUED | OUTPATIENT
Start: 2021-10-08 | End: 2021-10-08

## 2021-10-08 RX ORDER — METOPROLOL TARTRATE 50 MG
25 TABLET ORAL DAILY
Refills: 0 | Status: DISCONTINUED | OUTPATIENT
Start: 2021-10-08 | End: 2021-10-12

## 2021-10-08 RX ORDER — ACETAMINOPHEN 500 MG
650 TABLET ORAL EVERY 6 HOURS
Refills: 0 | Status: DISCONTINUED | OUTPATIENT
Start: 2021-10-08 | End: 2021-10-12

## 2021-10-08 RX ORDER — HEPARIN SODIUM 5000 [USP'U]/ML
5000 INJECTION INTRAVENOUS; SUBCUTANEOUS EVERY 8 HOURS
Refills: 0 | Status: DISCONTINUED | OUTPATIENT
Start: 2021-10-08 | End: 2021-10-12

## 2021-10-08 RX ORDER — ONDANSETRON 8 MG/1
4 TABLET, FILM COATED ORAL EVERY 8 HOURS
Refills: 0 | Status: DISCONTINUED | OUTPATIENT
Start: 2021-10-08 | End: 2021-10-12

## 2021-10-08 RX ORDER — SODIUM BICARBONATE 1 MEQ/ML
0.04 SYRINGE (ML) INTRAVENOUS
Qty: 75 | Refills: 0 | Status: DISCONTINUED | OUTPATIENT
Start: 2021-10-08 | End: 2021-10-09

## 2021-10-08 RX ORDER — LOSARTAN POTASSIUM 100 MG/1
0 TABLET, FILM COATED ORAL
Qty: 0 | Refills: 0 | DISCHARGE

## 2021-10-08 RX ORDER — SODIUM CHLORIDE 9 MG/ML
1000 INJECTION, SOLUTION INTRAVENOUS ONCE
Refills: 0 | Status: COMPLETED | OUTPATIENT
Start: 2021-10-08 | End: 2021-10-08

## 2021-10-08 RX ORDER — LANOLIN ALCOHOL/MO/W.PET/CERES
3 CREAM (GRAM) TOPICAL AT BEDTIME
Refills: 0 | Status: DISCONTINUED | OUTPATIENT
Start: 2021-10-08 | End: 2021-10-12

## 2021-10-08 RX ORDER — ATORVASTATIN CALCIUM 80 MG/1
80 TABLET, FILM COATED ORAL AT BEDTIME
Refills: 0 | Status: DISCONTINUED | OUTPATIENT
Start: 2021-10-08 | End: 2021-10-12

## 2021-10-08 RX ADMIN — Medication 75 MEQ/KG/HR: at 19:42

## 2021-10-08 RX ADMIN — ATORVASTATIN CALCIUM 80 MILLIGRAM(S): 80 TABLET, FILM COATED ORAL at 22:20

## 2021-10-08 RX ADMIN — HEPARIN SODIUM 5000 UNIT(S): 5000 INJECTION INTRAVENOUS; SUBCUTANEOUS at 22:20

## 2021-10-08 RX ADMIN — Medication 25 MILLIGRAM(S): at 22:20

## 2021-10-08 RX ADMIN — SODIUM CHLORIDE 1000 MILLILITER(S): 9 INJECTION, SOLUTION INTRAVENOUS at 12:07

## 2021-10-08 NOTE — ED PROVIDER NOTE - NS ED ROS FT
Constitutional: no fevers; no chills  HEENT: no visual changes, no sore throat, no rhinorrhea  CV: no cp; no palpitations  Resp: sob; no cough  GI: no abd pain, no nausea, no vomiting, diarrhea, no constipation  : no dysuria, no hematuria  MSK: no myalgias; no arthralgias  skin: no rashes  neuro: no HA, no numbness; no weakness, no tingling  ROS statement: all other ROS negative except as per HPI

## 2021-10-08 NOTE — H&P ADULT - PROBLEM SELECTOR PLAN 4
follows heme onc Dr Webster  CT w/ hepatic mets  - hold lanreotide, will eventually need f/u with onc re: other treatment options, possibly outpatient

## 2021-10-08 NOTE — H&P ADULT - NSICDXPASTMEDICALHX_GEN_ALL_CORE_FT
PAST MEDICAL HISTORY:  Hypercholesterolemia     Hypertension     Neuroendocrine carcinoma     PAD (peripheral artery disease)

## 2021-10-08 NOTE — H&P ADULT - NSHPPHYSICALEXAM_GEN_ALL_CORE
Vital Signs Last 24 Hrs  T(C): 36.9 (08 Oct 2021 12:11), Max: 36.9 (08 Oct 2021 12:11)  T(F): 98.4 (08 Oct 2021 12:11), Max: 98.4 (08 Oct 2021 12:11)  HR: 69 (08 Oct 2021 12:11) (69 - 88)  BP: 102/61 (08 Oct 2021 12:11) (81/47 - 102/61)  BP(mean): --  RR: 23 (08 Oct 2021 12:11) (20 - 23)  SpO2: 99% (08 Oct 2021 12:11) (96% - 99%)    CONSTITUTIONAL: Obese, well-groomed, in no apparent distress  EYES: No conjunctival or scleral injection, non-icteric; PERRLA and symmetric  ENMT: No external nasal lesions; nasal mucosa not inflamed; normal dentition; no pharyngeal injection or exudates, oral mucosa dry  RESPIRATORY: Breathing comfortably; lungs CTA without wheeze/rhonchi/rales  CARDIOVASCULAR: +S1S2, RRR, no M/G/R; pedal pulses full and symmetric; +LE edema 1+ b/l  GASTROINTESTINAL: No palpable masses or tenderness, +BS throughout, no rebound/guarding; no hernia palpated  MUSCULOSKELETAL: No digital clubbing; no paraspinal tenderness; no joint effusions of upper or lower extremities; normal strength and tone of extremities  SKIN: No rashes or ulcers noted; no subcutaneous nodules or induration palpable  NEUROLOGIC: sensation intact in LEs b/l to light touch  PSYCHIATRIC: A+O x 3; mood and affect appropriate; appropriate insight and judgment

## 2021-10-08 NOTE — H&P ADULT - NSHPSOCIALHISTORY_GEN_ALL_CORE
Denies smoking.  ETOH use disorder - drinks few glasses of wine daily, no hx of withdrawal in the past

## 2021-10-08 NOTE — H&P ADULT - PROBLEM SELECTOR PLAN 1
likely ATN 2/2 hypotension and pre-renal RAZIA in setting of volume depletion diarrhea d/t lanreotide side effect  - hold lanreotide  - IVF sodium bicarb as below  - bladder scan r/o retention  - f/u urine lytes  - nephrology consulted, f/u recs  - monitor uop closely, Cr  - keep MAP >65

## 2021-10-08 NOTE — H&P ADULT - NSHPLABSRESULTS_GEN_ALL_CORE
LABS:                         12.6   7.33  )-----------( 173      ( 08 Oct 2021 12:07 )             39.1     10    138  |  99  |  125<H>  ----------------------------<  135<H>  5.2   |  17<L>  |  6.97<H>    Ca    9.6      08 Oct 2021 12:07  Mg     2.1     10    TPro  7.2  /  Alb  4.5  /  TBili  0.3  /  DBili  x   /  AST  12  /  ALT  22  /  AlkPhos  53  10-08    PT/INR - ( 08 Oct 2021 12:12 )   PT: 11.3 sec;   INR: 0.94 ratio         PTT - ( 08 Oct 2021 12:12 )  PTT:29.0 sec  Urinalysis Basic - ( 08 Oct 2021 15:53 )    Color: Light Yellow / Appearance: Clear / S.011 / pH: x  Gluc: x / Ketone: Negative  / Bili: Negative / Urobili: Negative   Blood: x / Protein: Trace / Nitrite: Negative   Leuk Esterase: Small / RBC: 4 /hpf / WBC 6 /HPF   Sq Epi: x / Non Sq Epi: 2 /hpf / Bacteria: Negative        Serum Pro-Brain Natriuretic Peptide: 232 pg/mL (10-08 @ 12:07)      EKG personally reviewed nsr , no ST elev, depressions  CT  IMPRESSION:  *  No CT findings to suggest colitis.  *  Renal and bladder stones. No hydronephrosis.  *  Hepatic metastatic disease with background fatty liver.  *  No evidence of pleural or pericardial effusion, as clinically questioned. No evidence of metastatic disease chest.

## 2021-10-08 NOTE — CONSULT NOTE ADULT - SUBJECTIVE AND OBJECTIVE BOX
[facility] DIVISION OF KIDNEY DISEASES AND HYPERTENSION -- [phone]  -- INITIAL CONSULT NOTE  --------------------------------------------------------------------------------  HPI: 75 yo M PMHx HTN, HLD, DIOGO, previous spinal surgeries, recent dx of liver neuroendocrine tumor, started lanreotide last week, presents for worsening SOB. Normally states he can walk several blocks but now, SOB after 50 feet. Pt also has been having daily, non bloody watery diarrhea since receiving lanreotide 1 week ago. His leg swelling is the same. He denies abd pain, fevers, chills, dysuria, chest pain, dizziness.  Nephrology called for RAZIA. Upon review of [facility] pt's baseline SCr is 1.2-1.3 in 2020 up until 8/30/21. Today SCr is 6.97 with anion gap metabolic acidosis.           Patient seen & examined. Denies chest pain, fever, chills, increased frequency, dysuria, hematuria, pus in urine, frothy urine, SOB, leg edema, loss of appetite, pruritis, N/V.      PAST HISTORY  --------------------------------------------------------------------------------  PAST MEDICAL & SURGICAL HISTORY:  Hypertension    Hypercholesterolemia    PAD (peripheral artery disease)    S/P knee replacement, bilateral    S/P hip replacement  right hip    History of hernia repair    H/O laminectomy    History of lumbosacral spine surgery      FAMILY HISTORY:  No pertinent family history in first degree relatives      PAST SOCIAL HISTORY:    ALLERGIES & MEDICATIONS  --------------------------------------------------------------------------------  Allergies    No Known Allergies    Intolerances      Standing Inpatient Medications  heparin   Injectable 5000 Unit(s) SubCutaneous every 8 hours  sodium bicarbonate  Infusion 0.041 mEq/kG/Hr IV Continuous <Continuous>    PRN Inpatient Medications  acetaminophen   Tablet .. 650 milliGRAM(s) Oral every 6 hours PRN  aluminum hydroxide/magnesium hydroxide/simethicone Suspension 30 milliLiter(s) Oral every 4 hours PRN  melatonin 3 milliGRAM(s) Oral at bedtime PRN  ondansetron Injectable 4 milliGRAM(s) IV Push every 8 hours PRN      REVIEW OF SYSTEMS  --------------------------------------------------------------------------------  Gen: No  fevers/chills  Respiratory: No dyspnea, cough  CV: No chest pain  GI: No abdominal pain, diarrhea,  nausea, vomiting  : No increased frequency, dysuria, hematuria  MSK:  no edema  Neuro: No dizziness/lightheadedness    All other systems were reviewed and are negative, except as noted.    VITALS/PHYSICAL EXAM  --------------------------------------------------------------------------------  T(C): 36.9 (10-08-21 @ 12:11), Max: 36.9 (10-08-21 @ 12:11)  HR: 69 (10-08-21 @ 12:11) (69 - 88)  BP: 102/61 (10-08-21 @ 12:11) (81/47 - 102/61)  RR: 23 (10-08-21 @ 12:11) (20 - 23)  SpO2: 99% (10-08-21 @ 12:11) (96% - 99%)  Wt(kg): --  Height (cm): 188 (10-08-21 @ 10:53)  Weight (kg): 136.1 (10-08-21 @ 10:53)  BMI (kg/m2): 38.5 (10-08-21 @ 10:53)  BSA (m2): 2.58 (10-08-21 @ 10:53)      Physical Exam:  	Gen: NAD  	HEENT: MMM  	Pulm: CTA B/L  	CV: S1S2  	Abd: Soft, +BS   	Ext: No LE edema B/L, no asterixis  	Neuro: Awake, alert  	Skin: Warm and dry  	Vascular access:    LABS/STUDIES  --------------------------------------------------------------------------------              12.6   7.33  >-----------<  173      [10-08-21 @ 12:07]              39.1     138  |  99  |  125  ----------------------------<  135      [10-08-21 @ 12:07]  5.2   |  17  |  6.97        Ca     9.6     [10-08-21 @ 12:07]      Mg     2.1     [10-08-21 @ 12:07]    TPro  7.2  /  Alb  4.5  /  TBili  0.3  /  DBili  x   /  AST  12  /  ALT  22  /  AlkPhos  53  [10-08-21 @ 12:07]    PT/INR: PT 11.3 , INR 0.94       [10-08-21 @ 12:12]  PTT: 29.0       [10-08-21 @ 12:12]      Creatinine Trend:  SCr 6.97 [10-08 @ 12:07]    Urinalysis - [10-08-21 @ 15:53]      Color Light Yellow / Appearance Clear / SG 1.011 / pH 5.5      Gluc Negative / Ketone Negative  / Bili Negative / Urobili Negative       Blood Moderate / Protein Trace / Leuk Est Small / Nitrite Negative      RBC 4 / WBC 6 / Hyaline 3 / Gran  / Sq Epi  / Non Sq Epi 2 / Bacteria Negative      HbA1c 5.0      [02-02-19 @ 06:58]       [facility] DIVISION OF KIDNEY DISEASES AND HYPERTENSION -- [phone]  -- INITIAL CONSULT NOTE  --------------------------------------------------------------------------------  HPI: 75 yo M PMHx HTN, HLD, DIOGO, previous spinal surgeries, recent dx of liver neuroendocrine tumor, started lanreotide last week, presents for worsening SOB. Normally states he can walk several blocks but now, SOB after 50 feet. Pt also has been having daily, non bloody watery diarrhea since receiving lanreotide 1 week ago. His leg swelling is the same. He denies abd pain, fevers, chills, dysuria, chest pain, dizziness.  Nephrology called for RAZIA. Upon review of [facility] pt's baseline SCr is 1.2-1.3 in 2020 up until 8/30/21. Today SCr is 6.97 with anion gap metabolic acidosis. Pt reports taking losartan & lasix up until today. No history of NSAID use. Has been having ongoing watery diarrhea for the past week.           Patient seen & examined. Denies fever, chills, increased frequency, dysuria, hematuria, pus in urine, frothy urine, SOB, N/V.      PAST HISTORY  --------------------------------------------------------------------------------  PAST MEDICAL & SURGICAL HISTORY:  Hypertension    Hypercholesterolemia    PAD (peripheral artery disease)    S/P knee replacement, bilateral    S/P hip replacement  right hip    History of hernia repair    H/O laminectomy    History of lumbosacral spine surgery      FAMILY HISTORY:  No pertinent family history in first degree relatives      PAST SOCIAL HISTORY:    ALLERGIES & MEDICATIONS  --------------------------------------------------------------------------------  Allergies    No Known Allergies    Intolerances      Standing Inpatient Medications  heparin   Injectable 5000 Unit(s) SubCutaneous every 8 hours  sodium bicarbonate  Infusion 0.041 mEq/kG/Hr IV Continuous <Continuous>    PRN Inpatient Medications  acetaminophen   Tablet .. 650 milliGRAM(s) Oral every 6 hours PRN  aluminum hydroxide/magnesium hydroxide/simethicone Suspension 30 milliLiter(s) Oral every 4 hours PRN  melatonin 3 milliGRAM(s) Oral at bedtime PRN  ondansetron Injectable 4 milliGRAM(s) IV Push every 8 hours PRN      REVIEW OF SYSTEMS  --------------------------------------------------------------------------------  Gen: No  fevers/chills  Respiratory: No dyspnea, cough  CV: No chest pain  GI: No abdominal pain, diarrhea,  nausea, vomiting  : No increased frequency, dysuria, hematuria  MSK:  no edema  Neuro: No dizziness/lightheadedness    All other systems were reviewed and are negative, except as noted.    VITALS/PHYSICAL EXAM  --------------------------------------------------------------------------------  T(C): 36.9 (10-08-21 @ 12:11), Max: 36.9 (10-08-21 @ 12:11)  HR: 69 (10-08-21 @ 12:11) (69 - 88)  BP: 102/61 (10-08-21 @ 12:11) (81/47 - 102/61)  RR: 23 (10-08-21 @ 12:11) (20 - 23)  SpO2: 99% (10-08-21 @ 12:11) (96% - 99%)  Wt(kg): --  Height (cm): 188 (10-08-21 @ 10:53)  Weight (kg): 136.1 (10-08-21 @ 10:53)  BMI (kg/m2): 38.5 (10-08-21 @ 10:53)  BSA (m2): 2.58 (10-08-21 @ 10:53)      Physical Exam:  	Gen: NAD  	HEENT: MMM  	Pulm: CTA B/L  	CV: S1S2  	Abd: Soft, +BS   	Ext: No LE edema B/L, no asterixis  	Neuro: Awake, alert  	Skin: Warm and dry  	Vascular access:    LABS/STUDIES  --------------------------------------------------------------------------------              12.6   7.33  >-----------<  173      [10-08-21 @ 12:07]              39.1     138  |  99  |  125  ----------------------------<  135      [10-08-21 @ 12:07]  5.2   |  17  |  6.97        Ca     9.6     [10-08-21 @ 12:07]      Mg     2.1     [10-08-21 @ 12:07]    TPro  7.2  /  Alb  4.5  /  TBili  0.3  /  DBili  x   /  AST  12  /  ALT  22  /  AlkPhos  53  [10-08-21 @ 12:07]    PT/INR: PT 11.3 , INR 0.94       [10-08-21 @ 12:12]  PTT: 29.0       [10-08-21 @ 12:12]      Creatinine Trend:  SCr 6.97 [10-08 @ 12:07]    Urinalysis - [10-08-21 @ 15:53]      Color Light Yellow / Appearance Clear / SG 1.011 / pH 5.5      Gluc Negative / Ketone Negative  / Bili Negative / Urobili Negative       Blood Moderate / Protein Trace / Leuk Est Small / Nitrite Negative      RBC 4 / WBC 6 / Hyaline 3 / Gran  / Sq Epi  / Non Sq Epi 2 / Bacteria Negative      HbA1c 5.0      [02-02-19 @ 06:58]

## 2021-10-08 NOTE — ED ADULT NURSE REASSESSMENT NOTE - NS ED NURSE REASSESS COMMENT FT1
Pt resting comfortably, receiving fluids via IV. Daughter at bedside. Pt denies any complaints at present.

## 2021-10-08 NOTE — ED PROVIDER NOTE - OBJECTIVE STATEMENT
75 yo M PMHx HTN, HLD, DIOGO, previous spinal surgeries, recent dx of liver neuroendocrine tumor, started lanreotide last week, presents for worsening SOB. Normally states he can walk several blocks but now, SOB after 50 feet. Pt also has been having daily, non bloody watery diarrhea since receiving lanreotide. His leg swelling is the same. He denies abd pain, fevers, chills, dysuria, chest pain, dizziness.  PMD Dr. Agustin Cole/onc Dr. Stephon Webster

## 2021-10-08 NOTE — H&P ADULT - NSICDXPASTSURGICALHX_GEN_ALL_CORE_FT
PAST SURGICAL HISTORY:  H/O laminectomy     History of hernia repair     History of lumbosacral spine surgery     S/P hip replacement right hip    S/P knee replacement, bilateral

## 2021-10-08 NOTE — H&P ADULT - HISTORY OF PRESENT ILLNESS
77 yo M PMHx HTN, HLD, DIOGO, ETOH use disorder, previous spinal surgeries, recent dx of liver neuroendocrine tumor started lanreotide last week p/w weakness, dizziness, and SOB. Pt was in his USOH until 1 week prior when he received his first dose of lanreotide and began experiencing nonbloody watery diarrhea at least 2 episodes daily. He has had decreased PO intake, overall fatigue and been feeling dizzy. He takes torsemide daily for his SOB and LE edema, which has been unchanged. He reports normal urination and color, but feels that he is urinating more while on a diuretic. Denies dysuria, hematuria, foul smelling urine. He reports worsening body cramping throughout. No f/chills, cp, abd pain, n/v. No hx of diarrhea like this in the past.     In the ED, VS reviewed afebrile, hypotensive on arrival SBP 80s, improved to SBP 100s with 1 L LR

## 2021-10-08 NOTE — ED ADULT NURSE NOTE - NSIMPLEMENTINTERV_GEN_ALL_ED
Implemented All Universal Safety Interventions:  [place] to call system. Call bell, personal items and telephone within reach. Instruct patient to call for assistance. Room bathroom lighting operational. Non-slip footwear when patient is off stretcher. Physically safe environment: no spills, clutter or unnecessary equipment. Stretcher in lowest position, wheels locked, appropriate side rails in place.

## 2021-10-08 NOTE — ED ADULT NURSE NOTE - OBJECTIVE STATEMENT
complaining of shortness of breath and low bp x 3 days. Pt states, "Leigh had some difficulty breathing and low bp fdor a few days. I called my cardiologist and he said to come in and get evaluated in the ED." Pt endorses shortness of breath at present. Pt denies chest pain, HA, N/V/D, abdominal pain, fever/chills, urinary symptoms, hematuria, weakness, dizziness, numbness, tinging at present.

## 2021-10-08 NOTE — ED ADULT TRIAGE NOTE - PATIENT ON (OXYGEN DELIVERY METHOD)
Patient's daughter Sarah is returning call to office for her father. Regarding a medication. She stated he's been out of this mediation for 3 days   room air

## 2021-10-08 NOTE — ED PROVIDER NOTE - NSICDXPASTMEDICALHX_GEN_ALL_CORE_FT
PAST MEDICAL HISTORY:  Hypercholesterolemia     Hypertension     PAD (peripheral artery disease)

## 2021-10-08 NOTE — H&P ADULT - NSHPREVIEWOFSYSTEMS_GEN_ALL_CORE
Review of Systems:   CONSTITUTIONAL: No fever, weight loss  EYES: No eye pain, visual disturbances, or discharge  ENMT:  No difficulty hearing, tinnitus, vertigo; No sinus or throat pain  RESPIRATORY: +sob. No cough, wheezing, chills or hemoptysis  CARDIOVASCULAR: No chest pain, palpitations, dizziness. +LEG SWELLING  GASTROINTESTINAL: +diarrhea. No abdominal or epigastric pain. No nausea, vomiting, or hematemesis; No constipation. No melena or hematochezia.  GENITOURINARY: No dysuria, frequency, hematuria, or incontinence  NEUROLOGICAL: No headaches, memory loss, loss of strength, numbness, or tremors  SKIN: No itching, burning, rashes, or lesions   ENDOCRINE: No heat or cold intolerance; No hair loss  MUSCULOSKELETAL: No joint pain or swelling; +muscle cramping  PSYCHIATRIC: +difficulty sleeping  HEME/LYMPH: No easy bruising, or bleeding gums

## 2021-10-08 NOTE — H&P ADULT - ASSESSMENT
75 yo M PMHx HTN, HLD, DIOGO, ETOH use disorder, previous spinal surgeries, recent dx of liver neuroendocrine tumor started lanreotide last week p/w weakness, dizziness, and SOB adm with RAZIA/ATN and acute metabolic acidosis.

## 2021-10-08 NOTE — ED PROVIDER NOTE - PHYSICAL EXAMINATION
PHYSICAL EXAM:  GENERAL: non-toxic appearing; in no respiratory distress  HEAD Atraumatic, Normocephalic  NECK: No JVD; trachea midline  EYES: PERRL, EOMs intact b/l w/out deficits; normal conjunctiva  CHEST/LUNG: CTAB no wheezes/rhonchi/rales  HEART: RRR no murmur/gallops/rubs  ABDOMEN: +BS, soft, NT, ND  EXTREMITIES: No LE edema, +2 radial pulses b/l, +2 DP/PT pulses b/l  MUSCULOSKELETAL: FROM of all 4 extremities;  NERVOUS SYSTEM:  A&Ox3, No motor deficits or sensory deficits; CNII-XII intact; no focal neurologic deficits  SKIN:  Warm and dry as visualized

## 2021-10-08 NOTE — ED PROVIDER NOTE - PROGRESS NOTE DETAILS
Demarcus Pastor MD. pt urinated here. BP up to 100s systolic. pt hd stable. sefvere juan. will admit. paged heme/onc dr. dill, awaiting call back.

## 2021-10-08 NOTE — CONSULT NOTE ADULT - ASSESSMENT
75 yo M PMHx HTN, HLD, DIOGO, previous spinal surgeries, recent dx of liver neuroendocrine tumor, started lanreotide last week, presents for worsening SOB. Normally states he can walk several blocks but now, SOB after 50 feet. Pt also has been having daily, non bloody watery diarrhea since receiving lanreotide 1 week ago. His leg swelling is the same. He denies abd pain, fevers, chills, dysuria, chest pain, dizziness. Nephrology called for RAZIA.             RAZIA on CKD-  Razia likely in the setting of ATN from hypotension & ongoing GI losses. Upon review of [facility] pt's baseline SCr is 1.2-1.3 in 2020 up until 8/30/21. Today SCr is 6.97 with anion gap metabolic acidosis.  Send UA, urine electrolytes, spot urine TP/CR. Check bladder scan & Renal US. Recommend Ivory catheter placement if retaining. Agree with bicarb gtt for now. Will need to consider HD if renal failure continues to worsen. Monitor labs and urine output. Avoid NSAIDs, ACEI/ARBS, RCA and nephrotoxins. Dose medications as per eGFR.      Respiratory acidosis with AGMA-   Lactate wnl  Due to continued bicarb losses from diarrhea; agree with bicarb gtt for now.   f/u BMP          If you have any questions, please feel free to contact me  [employer]  Nephrology Fellow  Pager NS: [phone]/ [facility]: [phone]    (After 5 pm or on weekends please page the on-call fellow, can check [email or web address] for schedule. Login is salvatore ching, schedule under [facility] medicine, psych, derm)       75 yo M PMHx HTN, HLD, DIOGO, previous spinal surgeries, recent dx of liver neuroendocrine tumor, started lanreotide last week, presents for worsening SOB. Normally states he can walk several blocks but now, SOB after 50 feet. Pt also has been having daily, non bloody watery diarrhea since receiving lanreotide 1 week ago. His leg swelling is the same. He denies abd pain, fevers, chills, dysuria, chest pain, dizziness. Nephrology called for RAZIA.             RAZIA on CKD-  Razia likely in the setting of ATN from hypotension & ongoing GI losses. Upon review of [facility] pt's baseline SCr is 1.2-1.3 in 2020 up until 8/30/21. Today SCr is 6.97 with anion gap metabolic acidosis.  Send UA, urine electrolytes, spot urine TP/CR. Check bladder scan & Renal US. Recommend Ivory catheter placement if retaining. Agree with bicarb gtt for now. Will need to consider HD if renal failure continues to worsen. Monitor labs and urine output. Avoid NSAIDs, ACEI/ARBS, RCA and nephrotoxins. Dose medications as per eGFR.      Respiratory acidosis with AGMA-   Lactate wnl  Due to continued bicarb losses from diarrhea; agree with bicarb gtt for now.   f/u BMP  Respiratory acidosis- per primary team          If you have any questions, please feel free to contact me  [employer]  Nephrology Fellow  Pager NS: [phone]/ [facility]: [phone]    (After 5 pm or on weekends please page the on-call fellow, can check [email or web address] for schedule. Login is salvatore ching, schedule under [facility] medicine, psych, derm)

## 2021-10-08 NOTE — ED ADULT TRIAGE NOTE - CHIEF COMPLAINT QUOTE
SOB x 1 week, lower extremity swelling x1 month. Decreased PO intake x a few days, recently diagnosed with cancer. Pt denies fevers, cough. SOB x 1 week, lower extremity swelling x1 month. Decreased PO intake x a few days, recently diagnosed with cancer. BP 80's systolic at home by PT. Pt denies fevers, cough.

## 2021-10-08 NOTE — ED PROVIDER NOTE - CLINICAL SUMMARY MEDICAL DECISION MAKING FREE TEXT BOX
Demarcus Pastor MD. pt pmhx htn, hld, recent dx of neuroendocrine tumor of liver, got lanreotide last week, presents for 3-4 days of sob, worsening RAINES. also been having daily watery diarrhea, non bloody since getting lanreotide. pt denies fevers, abd pain, vomiting, or cp. pt's bp is soft, now currently 101/57. could be multiple components to his sob, could be pericardial/pleural effusion, or 2/2 dehydration. also concider colitis. will obtain labs, ekg, cxr, ct a/p, reassess Demarcus Pastor MD. pt pmhx htn, hld, recent dx of neuroendocrine tumor of liver, got lanreotide last week, presents for 3-4 days of sob, worsening RAINES. also been having daily watery diarrhea, non bloody since getting lanreotide. pt denies fevers, abd pain, vomiting, or cp. pt's bp is soft, now currently 101/57. could be multiple components to his sob, could be pericardial/pleural effusion, or 2/2 dehydration. also concider colitis. will obtain labs, ekg, cxr, ct a/p, reassess  ATTG: : short of breath concern for infectious / inflammatory, PE will check labs, check xray chest, consider CTA chest, and re eval for dispo

## 2021-10-08 NOTE — ED ADULT NURSE NOTE - CHIEF COMPLAINT QUOTE
SOB x 1 week, lower extremity swelling x1 month. Decreased PO intake x a few days, recently diagnosed with cancer. BP 80's systolic at home by PT. Pt denies fevers, cough.

## 2021-10-08 NOTE — ED PROVIDER NOTE - ATTENDING CONTRIBUTION TO CARE
75 y/o m with pmhx HTN, HLD, DIOGO, liver neuroendocrine tumor presents for feeling short of breath. He is usually able to walk several blocks but now can only walk approx 50 ft. Denies any heart palp or chest pain. no worsening leg swelling. no fever or chills no cough. no abd pain or back pain. Admits to having diarrhea recently described as non bloody and watery. no urinary complaints.   PMD Dr. Hameed  Gen.  no acute resp distress  HEENT:  perrl eomi. pharynx mmm  Lungs:  b/l bs  CVS: S1S2   Abd;  soft non tender no distention no guarding  Ext: b/l lower ext pitting edema no erythema  Neuro: aaox3  MSK: strength 5/5 b/l upper and lower ext.

## 2021-10-09 LAB
ALBUMIN SERPL ELPH-MCNC: 4.4 G/DL — SIGNIFICANT CHANGE UP (ref 3.3–5)
ALP SERPL-CCNC: 54 U/L — SIGNIFICANT CHANGE UP (ref 40–120)
ALT FLD-CCNC: 24 U/L — SIGNIFICANT CHANGE UP (ref 10–45)
ANION GAP SERPL CALC-SCNC: 19 MMOL/L — HIGH (ref 5–17)
ANION GAP SERPL CALC-SCNC: 19 MMOL/L — HIGH (ref 5–17)
AST SERPL-CCNC: 19 U/L — SIGNIFICANT CHANGE UP (ref 10–40)
BILIRUB SERPL-MCNC: 0.4 MG/DL — SIGNIFICANT CHANGE UP (ref 0.2–1.2)
BUN SERPL-MCNC: 109 MG/DL — HIGH (ref 7–23)
BUN SERPL-MCNC: 112 MG/DL — HIGH (ref 7–23)
CALCIUM SERPL-MCNC: 9.4 MG/DL — SIGNIFICANT CHANGE UP (ref 8.4–10.5)
CALCIUM SERPL-MCNC: 9.6 MG/DL — SIGNIFICANT CHANGE UP (ref 8.4–10.5)
CHLORIDE SERPL-SCNC: 102 MMOL/L — SIGNIFICANT CHANGE UP (ref 96–108)
CHLORIDE SERPL-SCNC: 103 MMOL/L — SIGNIFICANT CHANGE UP (ref 96–108)
CO2 SERPL-SCNC: 19 MMOL/L — LOW (ref 22–31)
CO2 SERPL-SCNC: 19 MMOL/L — LOW (ref 22–31)
COVID-19 SPIKE DOMAIN AB INTERP: POSITIVE
COVID-19 SPIKE DOMAIN ANTIBODY RESULT: >250 U/ML — HIGH
CREAT SERPL-MCNC: 4.31 MG/DL — HIGH (ref 0.5–1.3)
CREAT SERPL-MCNC: 4.33 MG/DL — HIGH (ref 0.5–1.3)
CULTURE RESULTS: SIGNIFICANT CHANGE UP
GLUCOSE SERPL-MCNC: 120 MG/DL — HIGH (ref 70–99)
GLUCOSE SERPL-MCNC: 120 MG/DL — HIGH (ref 70–99)
HCT VFR BLD CALC: 40.8 % — SIGNIFICANT CHANGE UP (ref 39–50)
HCV AB S/CO SERPL IA: 0.11 S/CO — SIGNIFICANT CHANGE UP (ref 0–0.99)
HCV AB SERPL-IMP: SIGNIFICANT CHANGE UP
HGB BLD-MCNC: 13.6 G/DL — SIGNIFICANT CHANGE UP (ref 13–17)
MAGNESIUM SERPL-MCNC: 2 MG/DL — SIGNIFICANT CHANGE UP (ref 1.6–2.6)
MAGNESIUM SERPL-MCNC: 2 MG/DL — SIGNIFICANT CHANGE UP (ref 1.6–2.6)
MCHC RBC-ENTMCNC: 31.1 PG — SIGNIFICANT CHANGE UP (ref 27–34)
MCHC RBC-ENTMCNC: 33.3 GM/DL — SIGNIFICANT CHANGE UP (ref 32–36)
MCV RBC AUTO: 93.2 FL — SIGNIFICANT CHANGE UP (ref 80–100)
NRBC # BLD: 0 /100 WBCS — SIGNIFICANT CHANGE UP (ref 0–0)
PHOSPHATE SERPL-MCNC: 6.1 MG/DL — HIGH (ref 2.5–4.5)
PHOSPHATE SERPL-MCNC: 6.2 MG/DL — HIGH (ref 2.5–4.5)
PLATELET # BLD AUTO: 179 K/UL — SIGNIFICANT CHANGE UP (ref 150–400)
POTASSIUM SERPL-MCNC: 4.2 MMOL/L — SIGNIFICANT CHANGE UP (ref 3.5–5.3)
POTASSIUM SERPL-MCNC: 4.3 MMOL/L — SIGNIFICANT CHANGE UP (ref 3.5–5.3)
POTASSIUM SERPL-SCNC: 4.2 MMOL/L — SIGNIFICANT CHANGE UP (ref 3.5–5.3)
POTASSIUM SERPL-SCNC: 4.3 MMOL/L — SIGNIFICANT CHANGE UP (ref 3.5–5.3)
PROT SERPL-MCNC: 7.1 G/DL — SIGNIFICANT CHANGE UP (ref 6–8.3)
RBC # BLD: 4.38 M/UL — SIGNIFICANT CHANGE UP (ref 4.2–5.8)
RBC # FLD: 14.1 % — SIGNIFICANT CHANGE UP (ref 10.3–14.5)
SARS-COV-2 IGG+IGM SERPL QL IA: >250 U/ML — HIGH
SARS-COV-2 IGG+IGM SERPL QL IA: POSITIVE
SODIUM SERPL-SCNC: 140 MMOL/L — SIGNIFICANT CHANGE UP (ref 135–145)
SODIUM SERPL-SCNC: 141 MMOL/L — SIGNIFICANT CHANGE UP (ref 135–145)
SPECIMEN SOURCE: SIGNIFICANT CHANGE UP
TROPONIN T, HIGH SENSITIVITY RESULT: 101 NG/L — HIGH (ref 0–51)
UUN UR-MCNC: 458 MG/DL — SIGNIFICANT CHANGE UP
WBC # BLD: 6.53 K/UL — SIGNIFICANT CHANGE UP (ref 3.8–10.5)
WBC # FLD AUTO: 6.53 K/UL — SIGNIFICANT CHANGE UP (ref 3.8–10.5)

## 2021-10-09 PROCEDURE — 76770 US EXAM ABDO BACK WALL COMP: CPT | Mod: 26

## 2021-10-09 PROCEDURE — 99232 SBSQ HOSP IP/OBS MODERATE 35: CPT | Mod: GC

## 2021-10-09 PROCEDURE — 99233 SBSQ HOSP IP/OBS HIGH 50: CPT

## 2021-10-09 PROCEDURE — 93970 EXTREMITY STUDY: CPT | Mod: 26

## 2021-10-09 RX ORDER — SODIUM BICARBONATE 1 MEQ/ML
0.06 SYRINGE (ML) INTRAVENOUS
Qty: 75 | Refills: 0 | Status: COMPLETED | OUTPATIENT
Start: 2021-10-09 | End: 2021-10-10

## 2021-10-09 RX ADMIN — HEPARIN SODIUM 5000 UNIT(S): 5000 INJECTION INTRAVENOUS; SUBCUTANEOUS at 06:49

## 2021-10-09 RX ADMIN — Medication 25 MILLIGRAM(S): at 21:47

## 2021-10-09 RX ADMIN — HEPARIN SODIUM 5000 UNIT(S): 5000 INJECTION INTRAVENOUS; SUBCUTANEOUS at 21:43

## 2021-10-09 RX ADMIN — ATORVASTATIN CALCIUM 80 MILLIGRAM(S): 80 TABLET, FILM COATED ORAL at 21:43

## 2021-10-09 RX ADMIN — HEPARIN SODIUM 5000 UNIT(S): 5000 INJECTION INTRAVENOUS; SUBCUTANEOUS at 13:45

## 2021-10-09 NOTE — PROGRESS NOTE ADULT - SUBJECTIVE AND OBJECTIVE BOX
INTERNAL MEDICINE PROGRESS NOTE    Dr. Doug Schwartz, DO  Hospitalist  Division of Hospital Medicine  [facility]  Available via [employer] Teams      SUBJECTIVE:  No acute complaints.     REVIEW OF SYSTEMS   10 point review of systems negative except for above.     PAST MEDICAL & SURGICAL HISTORY:  Hypertension    Hypercholesterolemia    PAD (peripheral artery disease)    Neuroendocrine carcinoma    S/P knee replacement, bilateral    S/P hip replacement  right hip    History of hernia repair    H/O laminectomy    History of lumbosacral spine surgery        MEDICATIONS  (STANDING):  atorvastatin 80 milliGRAM(s) Oral at bedtime  heparin   Injectable 5000 Unit(s) SubCutaneous every 8 hours  metoprolol succinate ER 25 milliGRAM(s) Oral daily  sodium bicarbonate  Infusion 0.041 mEq/kG/Hr (75 mL/Hr) IV Continuous <Continuous>    MEDICATIONS  (PRN):  acetaminophen   Tablet .. 650 milliGRAM(s) Oral every 6 hours PRN Temp greater or equal to 38C (100.4F), Mild Pain (1 - 3)  aluminum hydroxide/magnesium hydroxide/simethicone Suspension 30 milliLiter(s) Oral every 4 hours PRN Dyspepsia  LORazepam   Injectable 1 milliGRAM(s) IV Push every 2 hours PRN CIWA-Ar score increase by 2 points and a total score of 7 or less  melatonin 3 milliGRAM(s) Oral at bedtime PRN Insomnia  ondansetron Injectable 4 milliGRAM(s) IV Push every 8 hours PRN Nausea and/or Vomiting      Allergies    No Known Allergies    Intolerances        T(C): 36.6 (10-09-21 @ 12:08), Max: 36.8 (10-08-21 @ 20:10)  T(F): 97.8 (10-09-21 @ 12:08), Max: 98.2 (10-08-21 @ 20:10)  HR: 75 (10-09-21 @ 12:08) (75 - 90)  BP: 100/65 (10-09-21 @ 12:08) (99/64 - 143/80)  ABP: --  ABP(mean): --  RR: 18 (10-09-21 @ 12:08) (18 - 18)  SpO2: 97% (10-09-21 @ 12:08) (95% - 97%)      CONSTITUTIONAL: No acute distress.   HEENT:  Conjunctiva clear B/L.  Moist oral mucosa.   Cardiovascular: RRR with no murmurs. No JVD noted. No lower extremity edema B/L. Extremities are warm and well perfused. Radial pulses 2+ B/L. Dorsalis pedis pulses 1+ B/L.    Respiratory: Lungs CTAB. No wrr. No accessory muscle use.   Gastrointestinal:  Soft, nontender. Non-distended. Non-rigid. No CVA tenderness B/L.  MSK:  No joint swelling. No joint erythema B/L. No midline spinal tenderness.  Neurologic:  Alert and awake. Moving all extremities. Following commands. Making eye contact.    Skin: No clear rashes.   Psych:  Normal affect. Normal Mood.     LABS                        13.6   6.53  )-----------( 179      ( 09 Oct 2021 06:59 )             40.8     10    140  |  102  |  112<H>  ----------------------------<  120<H>  4.2   |  19<L>  |  4.33<H>    Ca    9.6      09 Oct 2021 06:59  Phos  6.2     10-  Mg     2.0     10-    TPro  7.1  /  Alb  4.4  /  TBili  0.4  /  DBili  x   /  AST  19  /  ALT  24  /  AlkPhos  54  10-09    PT/INR - ( 08 Oct 2021 12:12 )   PT: 11.3 sec;   INR: 0.94 ratio         PTT - ( 08 Oct 2021 12:12 )  PTT:29.0 sec  Urinalysis Basic - ( 08 Oct 2021 20:19 )    Color: Colorless / Appearance: Clear / S.009 / pH: x  Gluc: x / Ketone: Negative  / Bili: Negative / Urobili: Negative   Blood: x / Protein: Negative / Nitrite: Negative   Leuk Esterase: Negative / RBC: 1 /hpf / WBC 1 /HPF   Sq Epi: x / Non Sq Epi: 0 /hpf / Bacteria: 0.0        ALL RECENT STUDIES REVIEWED INCLUDING REPORTS AVAILABLE     CARE DISCUSSED WITH ALL CONSULTANTS

## 2021-10-09 NOTE — PROGRESS NOTE ADULT - PROBLEM SELECTOR PLAN 1
likely ATN 2/2 hypotension and pre-renal RAZIA in setting of volume depletion diarrhea d/t lanreotide side effect  - hold lanreotide  - C/w current bicarb drip; nephro in agreement.   - bladder scan for PVR and renal US pending.   - monitor uop and hemodynamics.

## 2021-10-09 NOTE — PROGRESS NOTE ADULT - SUBJECTIVE AND OBJECTIVE BOX
Patient is a 76y old  Male who presents with a chief complaint of RAZIA (09 Oct 2021 12:56)      INTERVAL HISTORY: feeling well but c/o left lower leg cramping    TELEMETRY Personally reviewed: SR with BBB    REVIEW OF SYSTEMS:   CONSTITUTIONAL: No weakness  EYES/ENT: No visual changes; No throat pain  Neck: No pain or stiffness  Respiratory: No cough, wheezing, No shortness of breath  CARDIOVASCULAR: no chest pain or palpitations  GASTROINTESTINAL: No abdominal pain, no nausea, vomiting or hematemesis  GENITOURINARY: No dysuria, frequency or hematuria  NEUROLOGICAL: No stroke like symptoms  SKIN: No rashes    	  MEDICATIONS:  metoprolol succinate ER 25 milliGRAM(s) Oral daily        PHYSICAL EXAM:  T(C): 36.6 (10-09-21 @ 12:08), Max: 36.8 (10-08-21 @ 20:10)  HR: 75 (10-09-21 @ 12:08) (75 - 90)  BP: 100/65 (10-09-21 @ 12:08) (99/64 - 143/80)  RR: 18 (10-09-21 @ 12:08) (18 - 18)  SpO2: 97% (10-09-21 @ 12:08) (95% - 97%)  Wt(kg): --  I&O's Summary    09 Oct 2021 07:01  -  09 Oct 2021 16:30  --------------------------------------------------------  IN: 0 mL / OUT: 1000 mL / NET: -1000 mL      Height (cm): 188 (10-09 @ 00:02)  Weight (kg): 136.3 (10-09 @ 00:02)  BMI (kg/m2): 38.6 (10-09 @ 00:02)  BSA (m2): 2.58 (10-09 @ 00:02)    Appearance: In no distress	  HEENT:    PERRL, EOMI	  Cardiovascular:  S1 S2, No JVD  Respiratory: Lungs clear to auscultation	  Gastrointestinal:  Soft, Non-tender, + BS	  Vascularature:  No edema of LE  Psychiatric: Appropriate affect   Neuro: no acute focal deficits                               13.6   6.53  )-----------( 179      ( 09 Oct 2021 06:59 )             40.8     10-09    140  |  102  |  112<H>  ----------------------------<  120<H>  4.2   |  19<L>  |  4.33<H>    Ca    9.6      09 Oct 2021 06:59  Phos  6.2     10-09  Mg     2.0     10-09    TPro  7.1  /  Alb  4.4  /  TBili  0.4  /  DBili  x   /  AST  19  /  ALT  24  /  AlkPhos  54  10-09        Labs personally reviewed      ASSESSMENT/PLAN: 	    75 yo M PMHx HTN, HLD, DIOGO, ETOH use disorder, previous spinal surgeries, recent dx of liver neuroendocrine tumor started lanreotide last week p/w weakness, dizziness, and SOB adm with RAZIA/ATN and acute metabolic acidosis.       Problem/Plan - 1:  ·  Problem: Elevated troponin.   ·  Plan: ekg without ischemic change. likely non-MI troponin elevation 2/2 RAZIA  trop peaked.       Problem/Plan - 2:  ·  Problem: Hypotension.   ·  Plan: d/t diuretic use & diarrhea causing hypotension  - hold torsemide  - ivf as above  - monitor bp.       Problem/Plan - 3:  ·  Problem: RAZIA (acute kidney injury).   ·  Plan: likely ATN 2/2 hypotension and pre-renal RAZIA in setting of volume depletion diarrhea d/t lanreotide side effect  - hold lanreotide  - C/w current bicarb drip; nephro in agreement.   - bladder scan for PVR and renal US pending.   - monitor uop and hemodynamics     Problem/Plan - 4:  ·  Problem: Prophylactic measure.   ·  Plan: dvt ppx: hsq.  -BLLE dopplers negative for DVT      Devi Blackmon MSN, FNP-BC, AGACNP-BC, RONNIE  Hank Hayes DO [facility]  Cardiovascular Medicine  [address], [address]  Office: [phone]  Cell: [phone] Patient is a 76y old  Male who presents with a chief complaint of RAZIA (09 Oct 2021 12:56)      INTERVAL HISTORY: feeling well but c/o left lower leg cramping    TELEMETRY Personally reviewed: SR with BBB    REVIEW OF SYSTEMS:   CONSTITUTIONAL: No weakness  EYES/ENT: No visual changes; No throat pain  Neck: No pain or stiffness  Respiratory: No cough, wheezing, No shortness of breath  CARDIOVASCULAR: no chest pain or palpitations  GASTROINTESTINAL: No abdominal pain, no nausea, vomiting or hematemesis  GENITOURINARY: No dysuria, frequency or hematuria  NEUROLOGICAL: No stroke like symptoms  SKIN: No rashes    	  MEDICATIONS:  metoprolol succinate ER 25 milliGRAM(s) Oral daily        PHYSICAL EXAM:  T(C): 36.6 (10-09-21 @ 12:08), Max: 36.8 (10-08-21 @ 20:10)  HR: 75 (10-09-21 @ 12:08) (75 - 90)  BP: 100/65 (10-09-21 @ 12:08) (99/64 - 143/80)  RR: 18 (10-09-21 @ 12:08) (18 - 18)  SpO2: 97% (10-09-21 @ 12:08) (95% - 97%)  Wt(kg): --  I&O's Summary    09 Oct 2021 07:01  -  09 Oct 2021 16:30  --------------------------------------------------------  IN: 0 mL / OUT: 1000 mL / NET: -1000 mL      Height (cm): 188 (10-09 @ 00:02)  Weight (kg): 136.3 (10-09 @ 00:02)  BMI (kg/m2): 38.6 (10-09 @ 00:02)  BSA (m2): 2.58 (10-09 @ 00:02)    Appearance: In no distress	  HEENT:    PERRL, EOMI	  Cardiovascular:  S1 S2, No JVD  Respiratory: Lungs clear to auscultation	  Gastrointestinal:  Soft, Non-tender, + BS	  Vascularature:  No edema of LE  Psychiatric: Appropriate affect   Neuro: no acute focal deficits                               13.6   6.53  )-----------( 179      ( 09 Oct 2021 06:59 )             40.8     10-09    140  |  102  |  112<H>  ----------------------------<  120<H>  4.2   |  19<L>  |  4.33<H>    Ca    9.6      09 Oct 2021 06:59  Phos  6.2     10-09  Mg     2.0     10-09    TPro  7.1  /  Alb  4.4  /  TBili  0.4  /  DBili  x   /  AST  19  /  ALT  24  /  AlkPhos  54  10-09        Labs personally reviewed      ASSESSMENT/PLAN: 	    75 yo M PMHx HTN, HLD, DIOGO, ETOH use disorder, previous spinal surgeries, recent dx of liver neuroendocrine tumor started lanreotide last week p/w weakness, dizziness, and SOB adm with RAZIA/ATN and acute metabolic acidosis.       Problem/Plan - 1:  ·  Problem: Elevated troponin.   ·  Plan: ekg without ischemic change. likely demand ischemia 2/2 hypotension and 2/2 RAZIA  trop peaked.  Echo       Problem/Plan - 2:  ·  Problem: Hypotension.   ·  Plan: d/t diuretic use & diarrhea causing hypotension  - hold torsemide  - ivf as above  - monitor bp.       Problem/Plan - 3:  ·  Problem: RAZIA (acute kidney injury).   ·  Plan: likely ATN 2/2 hypotension and pre-renal RAZIA in setting of volume depletion diarrhea d/t lanreotide side effect  - hold lanreotide  - C/w current bicarb drip; nephro in agreement.   - bladder scan for PVR and renal US pending.   - monitor uop and hemodynamics     Problem/Plan - 4:  ·  Problem: Prophylactic measure.   ·  Plan: dvt ppx: hsq.  -BLLE dopplers negative for DVT      Devi Blackmon MSN, FNP-BC, AGACNP-BC, RONNIE  Hank Hayes DO [facility]  Cardiovascular Medicine  [address], [address]  Office: [phone]  Cell: [phone] Patient is a 76y old  Male who presents with a chief complaint of RAZIA (09 Oct 2021 12:56)      INTERVAL HISTORY: feeling well but c/o left lower leg cramping    TELEMETRY Personally reviewed: SR with BBB    REVIEW OF SYSTEMS:   CONSTITUTIONAL: No weakness  EYES/ENT: No visual changes; No throat pain  Neck: No pain or stiffness  Respiratory: No cough, wheezing, No shortness of breath  CARDIOVASCULAR: no chest pain or palpitations  GASTROINTESTINAL: No abdominal pain, no nausea, vomiting or hematemesis  GENITOURINARY: No dysuria, frequency or hematuria  NEUROLOGICAL: No stroke like symptoms  SKIN: No rashes    	  MEDICATIONS:  metoprolol succinate ER 25 milliGRAM(s) Oral daily        PHYSICAL EXAM:  T(C): 36.6 (10-09-21 @ 12:08), Max: 36.8 (10-08-21 @ 20:10)  HR: 75 (10-09-21 @ 12:08) (75 - 90)  BP: 100/65 (10-09-21 @ 12:08) (99/64 - 143/80)  RR: 18 (10-09-21 @ 12:08) (18 - 18)  SpO2: 97% (10-09-21 @ 12:08) (95% - 97%)  Wt(kg): --  I&O's Summary    09 Oct 2021 07:01  -  09 Oct 2021 16:30  --------------------------------------------------------  IN: 0 mL / OUT: 1000 mL / NET: -1000 mL      Height (cm): 188 (10-09 @ 00:02)  Weight (kg): 136.3 (10-09 @ 00:02)  BMI (kg/m2): 38.6 (10-09 @ 00:02)  BSA (m2): 2.58 (10-09 @ 00:02)    Appearance: In no distress	  HEENT:    PERRL, EOMI	  Cardiovascular:  S1 S2, No JVD  Respiratory: Lungs clear to auscultation	  Gastrointestinal:  Soft, Non-tender, + BS	  Vascularature:  No edema of LE  Psychiatric: Appropriate affect   Neuro: no acute focal deficits                               13.6   6.53  )-----------( 179      ( 09 Oct 2021 06:59 )             40.8     10-09    140  |  102  |  112<H>  ----------------------------<  120<H>  4.2   |  19<L>  |  4.33<H>    Ca    9.6      09 Oct 2021 06:59  Phos  6.2     10-09  Mg     2.0     10-09    TPro  7.1  /  Alb  4.4  /  TBili  0.4  /  DBili  x   /  AST  19  /  ALT  24  /  AlkPhos  54  10-09        Labs personally reviewed      ASSESSMENT/PLAN: 	    75 yo M PMHx HTN, HLD, DIOGO, ETOH use disorder, previous spinal surgeries, recent dx of liver neuroendocrine tumor started lanreotide last week p/w weakness, dizziness, and SOB adm with RAZIA/ATN and acute metabolic acidosis.       Problem/Plan - 1:  ·  Problem: Elevated troponin.   ·  Plan: ekg without ischemic change. likely demand ischemia 2/2 hypotension and 2/2 RAZIA  trop peaked.  Echo       Problem/Plan - 2:  ·  Problem: Hypotension.   ·  Plan: d/t diuretic use & diarrhea causing hypotension  - hold torsemide  - ivf as above  - monitor bp.       Problem/Plan - 3:  ·  Problem: RAZIA (acute kidney injury).   ·  Plan: likely ATN 2/2 hypotension and pre-renal RAZIA in setting of volume depletion diarrhea d/t lanreotide side effect  - hold lanreotide  - C/w current bicarb drip; nephro in agreement.   - bladder scan for PVR and renal US pending.   - monitor uop and hemodynamics     Problem/Plan - 4:  ·  Problem: Prophylactic measure.   ·  Plan: dvt ppx: hsq.  -BLLE dopplers negative for DVT      Advanced care planning/advanced directives discussed with patient/family. DNR status including forceful chest compressions to attempt to restart the heart, ventilator support/artificial breathing, electric shock, artificial nutrition, health care proxy, Molst form all discussed with pt. Pt wishes to consider.  More than fifteen minutes spent on discussing advanced directives. OMT on six regions for acute somatic dysfunctions done at the bedside     Devi Blackmon MSN, FNP-BC, AGACNP-BC, RONNIE  Hank Hayes, DO [facility]  Cardiovascular Medicine  [address], [address]  Office: [phone]  Cell: [phone]

## 2021-10-09 NOTE — PROGRESS NOTE ADULT - SUBJECTIVE AND OBJECTIVE BOX
[facility] DIVISION OF KIDNEY DISEASES AND HYPERTENSION -- FOLLOW UP NOTE  --------------------------------------------------------------------------------  Chief Complaint:    24 hour events/subjective:    seen and examined this morning   no acute issues at this time   reports feeling well     PAST HISTORY  --------------------------------------------------------------------------------  No significant changes to PMH, PSH, FHx, SHx, unless otherwise noted    ALLERGIES & MEDICATIONS  --------------------------------------------------------------------------------  Allergies    No Known Allergies    Intolerances      Standing Inpatient Medications  atorvastatin 80 milliGRAM(s) Oral at bedtime  heparin   Injectable 5000 Unit(s) SubCutaneous every 8 hours  metoprolol succinate ER 25 milliGRAM(s) Oral daily  sodium bicarbonate  Infusion 0.041 mEq/kG/Hr IV Continuous <Continuous>    PRN Inpatient Medications  acetaminophen   Tablet .. 650 milliGRAM(s) Oral every 6 hours PRN  aluminum hydroxide/magnesium hydroxide/simethicone Suspension 30 milliLiter(s) Oral every 4 hours PRN  LORazepam   Injectable 1 milliGRAM(s) IV Push every 2 hours PRN  melatonin 3 milliGRAM(s) Oral at bedtime PRN  ondansetron Injectable 4 milliGRAM(s) IV Push every 8 hours PRN      REVIEW OF SYSTEMS    All other systems were reviewed and are negative, except as noted.    VITALS/PHYSICAL EXAM  --------------------------------------------------------------------------------  T(C): 36.4 (10-09-21 @ 06:49), Max: 36.9 (10-08-21 @ 12:11)  HR: 76 (10-09-21 @ 06:49) (69 - 90)  BP: 99/64 (10-09-21 @ 06:49) (99/64 - 143/80)  RR: 18 (10-09-21 @ 06:49) (18 - 23)  SpO2: 95% (10-09-21 @ 06:49) (95% - 99%)  Wt(kg): --  Height (cm): 188 (10-09-21 @ 00:02)  Weight (kg): 136.3 (10-09-21 @ 00:02)  BMI (kg/m2): 38.6 (10-09-21 @ 00:02)  BSA (m2): 2.58 (10-09-21 @ 00:02)      10-09-21 @ 07:01  -  10-09-21 @ 11:34  --------------------------------------------------------  IN: 0 mL / OUT: 1000 mL / NET: -1000 mL        Physical Exam:  	Gen: NAD  	HEENT: MMM  	Pulm: CTA B/L  	CV: S1S2  	Abd: Soft, +BS   	Ext: No LE edema B/L  	Neuro: Awake  	Skin: Warm and dry  	Vascular access: N/A      LABS/STUDIES  --------------------------------------------------------------------------------              13.6   6.53  >-----------<  179      [10-09-21 @ 06:59]              40.8     140  |  102  |  112  ----------------------------<  120      [10-09-21 @ 06:59]  4.2   |  19  |  4.33        Ca     9.6     [10-09-21 @ 06:59]      Mg     2.0     [10-09-21 @ 06:59]      Phos  6.2     [10-09-21 @ 06:59]    TPro  7.1  /  Alb  4.4  /  TBili  0.4  /  DBili  x   /  AST  19  /  ALT  24  /  AlkPhos  54  [10-09-21 @ 06:59]    PT/INR: PT 11.3 , INR 0.94       [10-08-21 @ 12:12]  PTT: 29.0       [10-08-21 @ 12:12]      Creatinine Trend:  SCr 4.33 [10-09 @ 06:59]  SCr 5.52 [10-08 @ 21:05]  SCr 6.97 [10-08 @ 12:07]    Urinalysis - [10-08-21 @ 20:19]      Color Colorless / Appearance Clear / SG 1.009 / pH 5.0      Gluc Negative / Ketone Negative  / Bili Negative / Urobili Negative       Blood Trace / Protein Negative / Leuk Est Negative / Nitrite Negative      RBC 1 / WBC 1 / Hyaline 2 / Gran  / Sq Epi  / Non Sq Epi 0 / Bacteria 0.0    Urine Creatinine 63      [10-08-21 @ 20:19]  Urine Urea Nitrogen 458      [10-09-21 @ 01:02]  Urine Osmolality 376      [10-08-21 @ 20:19]    HbA1c 5.0      [02-02-19 @ 06:58]

## 2021-10-09 NOTE — PROGRESS NOTE ADULT - ASSESSMENT
75 yo M PMHx HTN, HLD, DIOGO, previous spinal surgeries, recent dx of liver neuroendocrine tumor, started lanreotide last week, presents for worsening SOB. Normally states he can walk several blocks but now, SOB after 50 feet. Pt also has been having daily, non bloody watery diarrhea since receiving lanreotide 1 week ago. His leg swelling is the same. He denies abd pain, fevers, chills, dysuria, chest pain, dizziness. Nephrology called for RAZIA.     RAZIA on CKD-  Razia likely in the setting of ATN from hypotension & ongoing GI losses.   Upon review of [facility] pt's baseline SCr is 1.2-1.3 in 2020 up until 8/30/21.   --- Upon presentation SCr is 6.97 with anion gap metabolic acidosis, which has since improved with IVF   - continue with bicarb infusion at this time    please check spot urine TP/CR.   Renal US  --- no evidence of urinary retention; monitor UOP  no need for emergent HD at this time   Avoid NSAIDs, ACEI/ARBS, RCA and nephrotoxins. Dose medications as per eGFR.    Respiratory acidosis with AGMA-   Lactate wnl  Due to continued bicarb losses from diarrhea; agree with bicarb gtt for now.   f/u BMP    If any questions, please feel free to contact me     Orville Peralta  Nephrology Fellow  [facility] Pager: [phone]   77 yo M PMHx HTN, HLD, DIOGO, previous spinal surgeries, recent dx of liver neuroendocrine tumor, started lanreotide last week, presents for worsening SOB. Normally states he can walk several blocks but now, SOB after 50 feet. Pt also has been having daily, non bloody watery diarrhea since receiving lanreotide 1 week ago. His leg swelling is the same. He denies abd pain, fevers, chills, dysuria, chest pain, dizziness. Nephrology called for RAZIA.     RAZIA on CKD-  Razia likely in the setting of ATN from hypotension & ongoing GI losses.   Upon review of [facility] pt's baseline SCr is 1.2-1.3 in 2020 up until 8/30/21.   --- Upon presentation SCr is 6.97 with anion gap metabolic acidosis, which has since improved with IVF   - continue with 1/2 NS with 75 meq of bicarb > increase rate to 100 cc/hour   please check spot urine TP/CR.   Renal US  --- no evidence of urinary retention; monitor UOP  no need for emergent HD at this time   Avoid NSAIDs, ACEI/ARBS, RCA and nephrotoxins. Dose medications as per eGFR.    Respiratory acidosis with AGMA-   Lactate wnl  Due to continued bicarb losses from diarrhea; agree with bicarb gtt for now.   f/u BMP    If any questions, please feel free to contact me     Orville Peralta  Nephrology Fellow  [facility] Pager: [phone]

## 2021-10-10 LAB
ANION GAP SERPL CALC-SCNC: 17 MMOL/L — SIGNIFICANT CHANGE UP (ref 5–17)
BUN SERPL-MCNC: 93 MG/DL — HIGH (ref 7–23)
CALCIUM SERPL-MCNC: 9.8 MG/DL — SIGNIFICANT CHANGE UP (ref 8.4–10.5)
CHLORIDE SERPL-SCNC: 103 MMOL/L — SIGNIFICANT CHANGE UP (ref 96–108)
CO2 SERPL-SCNC: 21 MMOL/L — LOW (ref 22–31)
CREAT SERPL-MCNC: 2.45 MG/DL — HIGH (ref 0.5–1.3)
GLUCOSE SERPL-MCNC: 122 MG/DL — HIGH (ref 70–99)
POTASSIUM SERPL-MCNC: 4.2 MMOL/L — SIGNIFICANT CHANGE UP (ref 3.5–5.3)
POTASSIUM SERPL-SCNC: 4.2 MMOL/L — SIGNIFICANT CHANGE UP (ref 3.5–5.3)
SODIUM SERPL-SCNC: 141 MMOL/L — SIGNIFICANT CHANGE UP (ref 135–145)

## 2021-10-10 PROCEDURE — 99233 SBSQ HOSP IP/OBS HIGH 50: CPT

## 2021-10-10 PROCEDURE — 99232 SBSQ HOSP IP/OBS MODERATE 35: CPT | Mod: GC

## 2021-10-10 RX ORDER — SODIUM BICARBONATE 1 MEQ/ML
0.06 SYRINGE (ML) INTRAVENOUS
Qty: 75 | Refills: 0 | Status: COMPLETED | OUTPATIENT
Start: 2021-10-10 | End: 2021-10-11

## 2021-10-10 RX ADMIN — HEPARIN SODIUM 5000 UNIT(S): 5000 INJECTION INTRAVENOUS; SUBCUTANEOUS at 05:08

## 2021-10-10 RX ADMIN — HEPARIN SODIUM 5000 UNIT(S): 5000 INJECTION INTRAVENOUS; SUBCUTANEOUS at 21:41

## 2021-10-10 RX ADMIN — HEPARIN SODIUM 5000 UNIT(S): 5000 INJECTION INTRAVENOUS; SUBCUTANEOUS at 13:01

## 2021-10-10 RX ADMIN — Medication 25 MILLIGRAM(S): at 21:41

## 2021-10-10 RX ADMIN — Medication 100 MEQ/KG/HR: at 20:08

## 2021-10-10 RX ADMIN — ATORVASTATIN CALCIUM 80 MILLIGRAM(S): 80 TABLET, FILM COATED ORAL at 21:41

## 2021-10-10 RX ADMIN — Medication 100 MEQ/KG/HR: at 05:20

## 2021-10-10 NOTE — PROGRESS NOTE ADULT - PROBLEM SELECTOR PLAN 1
likely ATN 2/2 hypotension and pre-renal RAZIA in setting of volume depletion diarrhea d/t lanreotide side effect  - hold lanreotide  - C/w current bicarb drip; nephro in agreement.   - Renal us noted.   - monitor uop and hemodynamics.

## 2021-10-10 NOTE — PROGRESS NOTE ADULT - SUBJECTIVE AND OBJECTIVE BOX
INTERNAL MEDICINE PROGRESS NOTE    Dr. Doug Schwartz, DO  Hospitalist  Division of Hospital Medicine  [facility]  Available via [employer] Teams      SUBJECTIVE:  No acute complaints.     REVIEW OF SYSTEMS   12 point review of systems negative except for above.     PAST MEDICAL & SURGICAL HISTORY:  Hypertension    Hypercholesterolemia    PAD (peripheral artery disease)    Neuroendocrine carcinoma    S/P knee replacement, bilateral    S/P hip replacement  right hip    History of hernia repair    H/O laminectomy    History of lumbosacral spine surgery        MEDICATIONS  (STANDING):  atorvastatin 80 milliGRAM(s) Oral at bedtime  heparin   Injectable 5000 Unit(s) SubCutaneous every 8 hours  metoprolol succinate ER 25 milliGRAM(s) Oral daily  sodium bicarbonate  Infusion 0.055 mEq/kG/Hr (100 mL/Hr) IV Continuous <Continuous>    MEDICATIONS  (PRN):  acetaminophen   Tablet .. 650 milliGRAM(s) Oral every 6 hours PRN Temp greater or equal to 38C (100.4F), Mild Pain (1 - 3)  aluminum hydroxide/magnesium hydroxide/simethicone Suspension 30 milliLiter(s) Oral every 4 hours PRN Dyspepsia  LORazepam   Injectable 1 milliGRAM(s) IV Push every 2 hours PRN CIWA-Ar score increase by 2 points and a total score of 7 or less  melatonin 3 milliGRAM(s) Oral at bedtime PRN Insomnia  ondansetron Injectable 4 milliGRAM(s) IV Push every 8 hours PRN Nausea and/or Vomiting      Allergies    No Known Allergies    Intolerances        T(C): 36.7 (10-09-21 @ 23:05), Max: 36.7 (10-09-21 @ 23:05)  T(F): 98.1 (10-09-21 @ 23:05), Max: 98.1 (10-09-21 @ 23:05)  HR: 74 (10-09-21 @ 23:05) (74 - 80)  BP: 126/68 (10-09-21 @ 23:05) (100/65 - 126/68)  ABP: --  ABP(mean): --  RR: 18 (10-09-21 @ 23:05) (18 - 18)  SpO2: 97% (10-09-21 @ 23:05) (96% - 97%)      CONSTITUTIONAL: No acute distress.   HEENT:  Conjunctiva clear B/L.  Moist oral mucosa.   Cardiovascular: RRR with no murmurs. No JVD noted. No lower extremity edema B/L. Extremities are warm and well perfused. Radial pulses 2+ B/L. Dorsalis pedis pulses 1+ B/L.    Respiratory: Lungs CTAB. No wrr. No accessory muscle use.   Gastrointestinal:  Soft, nontender. Non-distended. Non-rigid. No CVA tenderness B/L.  MSK:  No joint swelling. No joint erythema B/L. No midline spinal tenderness.  Neurologic:  Alert and awake. Moving all extremities. Following commands. Making eye contact.    Skin: No clear rashes.   Psych:  Normal affect. Normal Mood.     LABS                        13.6   6.53  )-----------( 179      ( 09 Oct 2021 06:59 )             40.8     10-10    141  |  103  |  93<H>  ----------------------------<  122<H>  4.2   |  21<L>  |  2.45<H>    Ca    9.8      10 Oct 2021 06:55  Phos  6.2     10-  Mg     2.0     10-09    TPro  7.1  /  Alb  4.4  /  TBili  0.4  /  DBili  x   /  AST  19  /  ALT  24  /  AlkPhos  54  10-09    PT/INR - ( 08 Oct 2021 12:12 )   PT: 11.3 sec;   INR: 0.94 ratio         PTT - ( 08 Oct 2021 12:12 )  PTT:29.0 sec  Urinalysis Basic - ( 08 Oct 2021 20:19 )    Color: Colorless / Appearance: Clear / S.009 / pH: x  Gluc: x / Ketone: Negative  / Bili: Negative / Urobili: Negative   Blood: x / Protein: Negative / Nitrite: Negative   Leuk Esterase: Negative / RBC: 1 /hpf / WBC 1 /HPF   Sq Epi: x / Non Sq Epi: 0 /hpf / Bacteria: 0.0        ALL RECENT STUDIES REVIEWED INCLUDING REPORTS AVAILABLE   LE venous doppler  Renal US    CARE DISCUSSED WITH ALL CONSULTANTS  Nephro

## 2021-10-10 NOTE — PROGRESS NOTE ADULT - ASSESSMENT
75 yo M PMHx HTN, HLD, DIOGO, previous spinal surgeries, recent dx of liver neuroendocrine tumor, started lanreotide last week, presents for worsening SOB. Normally states he can walk several blocks but now, SOB after 50 feet. Pt also has been having daily, non bloody watery diarrhea since receiving lanreotide 1 week ago. His leg swelling is the same. He denies abd pain, fevers, chills, dysuria, chest pain, dizziness. Nephrology called for RAZIA.     RAZIA on CKD-  Razia likely in the setting of ATN from hypotension & ongoing GI losses.   Upon review of [facility] pt's baseline SCr is 1.2-1.3 in 2020 up until 8/30/21.   --- Upon presentation SCr is 6.97 with anion gap metabolic acidosis, which has since significantly with IVF  - continue with 1/2 NS with 75 meq of bicarb at current rate for today  Renal US  --- no evidence of urinary retention; monitor UOP  no need for emergent HD at this time   Avoid NSAIDs, ACEI/ARBS, RCA and nephrotoxins. Dose medications as per eGFR.    Respiratory acidosis with AGMA-   Due to intitial bicarb losses from diarrhea; agree with bicarb gtt for now.   f/u BMP    ***   77 yo M PMHx HTN, HLD, DIOGO, previous spinal surgeries, recent dx of liver neuroendocrine tumor, started lanreotide last week, presents for worsening SOB. Normally states he can walk several blocks but now, SOB after 50 feet. Pt also has been having daily, non bloody watery diarrhea since receiving lanreotide 1 week ago. His leg swelling is the same. He denies abd pain, fevers, chills, dysuria, chest pain, dizziness. Nephrology called for RAZIA.     RZAIA on CKD-  Razia likely in the setting of ATN from hypotension & ongoing GI losses.   Upon review of [facility] pt's baseline SCr is 1.2-1.3 in 2020 up until 8/30/21.   --- Upon presentation SCr is 6.97 with anion gap metabolic acidosis, which has since significantly with IVF  - continue with 1/2 NS with 75 meq of bicarb at current rate for today  --- repeat labs tomorrow in the AM   Renal US  --- no evidence of urinary retention; monitor UOP  no need for emergent HD at this time   Avoid NSAIDs, ACEI/ARBS, RCA and nephrotoxins. Dose medications as per eGFR.    Respiratory acidosis with AGMA- improving  Due to intitial bicarb losses from diarrhea  f/u BMP    If any questions, please feel free to contact me     Orville Peralta  Nephrology Fellow  [facility] Pager: [phone]

## 2021-10-10 NOTE — PROGRESS NOTE ADULT - SUBJECTIVE AND OBJECTIVE BOX
Patient is a 76y old  Male who presents with a chief complaint of RAZIA (10 Oct 2021 11:35)      INTERVAL HISTORY: pt feeling better       REVIEW OF SYSTEMS:   CONSTITUTIONAL: No weakness  EYES/ENT: No visual changes; No throat pain  Neck: No pain or stiffness  Respiratory: No cough, wheezing, No shortness of breath  CARDIOVASCULAR: no chest pain or palpitations  GASTROINTESTINAL: No abdominal pain, no nausea, vomiting or hematemesis  GENITOURINARY: No dysuria, frequency or hematuria  NEUROLOGICAL: No stroke like symptoms  SKIN: No rashes    	  MEDICATIONS:  metoprolol succinate ER 25 milliGRAM(s) Oral daily        PHYSICAL EXAM:  T(C): 36.8 (10-10-21 @ 10:13), Max: 36.8 (10-10-21 @ 10:13)  HR: 67 (10-10-21 @ 10:13) (67 - 80)  BP: 122/65 (10-10-21 @ 10:13) (111/64 - 126/68)  RR: 18 (10-10-21 @ 10:13) (18 - 18)  SpO2: 96% (10-10-21 @ 10:13) (96% - 97%)  Wt(kg): --  I&O's Summary    09 Oct 2021 07:01  -  10 Oct 2021 07:00  --------------------------------------------------------  IN: 2380 mL / OUT: 2650 mL / NET: -270 mL          Appearance: In no distress	  HEENT:    PERRL, EOMI	  Cardiovascular:  S1 S2, No JVD  Respiratory: Lungs clear to auscultation	  Gastrointestinal:  Soft, Non-tender, + BS	  Vascularature:  trace BLLE edema  Psychiatric: Appropriate affect   Neuro: no acute focal deficits                               13.6   6.53  )-----------( 179      ( 09 Oct 2021 06:59 )             40.8     10-10    141  |  103  |  93<H>  ----------------------------<  122<H>  4.2   |  21<L>  |  2.45<H>    Ca    9.8      10 Oct 2021 06:55  Phos  6.2     10-09  Mg     2.0     10-09    TPro  7.1  /  Alb  4.4  /  TBili  0.4  /  DBili  x   /  AST  19  /  ALT  24  /  AlkPhos  54  10-09        Labs personally reviewed      ASSESSMENT/PLAN: 	  75 yo M PMHx HTN, HLD, DIOGO, ETOH use disorder, previous spinal surgeries, recent dx of liver neuroendocrine tumor started lanreotide last week p/w weakness, dizziness, and SOB adm with RAZIA/ATN and acute metabolic acidosis.       Problem/Plan - 1:  ·  Problem: Elevated troponin.   ·  Plan: ekg without ischemic change. likely non-MI troponin elevation 2/2 RAZIA  trop peaked.       Problem/Plan - 2:  ·  Problem: Hypotension- resolved  ·  Plan: d/t diuretic use & diarrhea causing hypotension  - hold torsemide- resume when creatinine drops under 2  - daily weights  -i's & o's  - ivf as above  - monitor bp.       Problem/Plan - 3:  ·  Problem: RAZIA (acute kidney injury).   ·  Plan: likely ATN 2/2 hypotension and pre-renal RAZIA in setting of volume depletion diarrhea d/t lanreotide side effect  - hold lanreotide  - C/w current bicarb drip; nephro in agreement.   - bladder scan for PVR and renal US pending.   - monitor uop and hemodynamics     Problem/Plan - 4:  ·  Problem: Prophylactic measure.   ·  Plan: dvt ppx: hsq.  -BLLE dopplers negative for DVT        Devi Blackmon MSN, FNP-BC, AGACNP-BC, RONNIE  Hank Hayes DO [facility]  Cardiovascular Medicine  [address] [place] [address], [address]  Office: [phone]  Cell: [phone]

## 2021-10-10 NOTE — PROGRESS NOTE ADULT - SUBJECTIVE AND OBJECTIVE BOX
[facility] DIVISION OF KIDNEY DISEASES AND HYPERTENSION -- FOLLOW UP NOTE  --------------------------------------------------------------------------------  Chief Complaint:    24 hour events/subjective:    seen and examined this morning   reports feeling well  no acute issues overnight   denied any episodes of diarrhea    PAST HISTORY  --------------------------------------------------------------------------------  No significant changes to PMH, PSH, FHx, SHx, unless otherwise noted    ALLERGIES & MEDICATIONS  --------------------------------------------------------------------------------  Allergies    No Known Allergies    Intolerances      Standing Inpatient Medications  atorvastatin 80 milliGRAM(s) Oral at bedtime  heparin   Injectable 5000 Unit(s) SubCutaneous every 8 hours  metoprolol succinate ER 25 milliGRAM(s) Oral daily  sodium bicarbonate  Infusion 0.055 mEq/kG/Hr IV Continuous <Continuous>    PRN Inpatient Medications  acetaminophen   Tablet .. 650 milliGRAM(s) Oral every 6 hours PRN  aluminum hydroxide/magnesium hydroxide/simethicone Suspension 30 milliLiter(s) Oral every 4 hours PRN  LORazepam   Injectable 1 milliGRAM(s) IV Push every 2 hours PRN  melatonin 3 milliGRAM(s) Oral at bedtime PRN  ondansetron Injectable 4 milliGRAM(s) IV Push every 8 hours PRN      REVIEW OF SYSTEMS      All other systems were reviewed and are negative, except as noted.    VITALS/PHYSICAL EXAM  --------------------------------------------------------------------------------  T(C): 36.7 (10-09-21 @ 23:05), Max: 36.7 (10-09-21 @ 23:05)  HR: 74 (10-09-21 @ 23:05) (74 - 80)  BP: 126/68 (10-09-21 @ 23:05) (100/65 - 126/68)  RR: 18 (10-09-21 @ 23:05) (18 - 18)  SpO2: 97% (10-09-21 @ 23:05) (96% - 97%)  Wt(kg): --  Height (cm): 188 (10-09-21 @ 00:02)  Weight (kg): 136.3 (10-09-21 @ 00:02)  BMI (kg/m2): 38.6 (10-09-21 @ 00:02)  BSA (m2): 2.58 (10-09-21 @ 00:02)      10-09-21 @ 07:01  -  10-10-21 @ 07:00  --------------------------------------------------------  IN: 2380 mL / OUT: 2650 mL / NET: -270 mL        Physical Exam:  	Gen: NAD  	HEENT: MMM  	Pulm: CTA B/L  	CV: S1S2  	Abd: Soft, +BS   	Ext: No LE edema B/L  	Neuro: Awake  	Skin: Warm and dry  	Vascular access: N/A      LABS/STUDIES  --------------------------------------------------------------------------------              13.6   6.53  >-----------<  179      [10-09-21 @ 06:59]              40.8     141  |  103  |  93  ----------------------------<  122      [10-10-21 @ 06:55]  4.2   |  21  |  2.45        Ca     9.8     [10-10-21 @ 06:55]      Mg     2.0     [10-09-21 @ 06:59]      Phos  6.2     [10-09-21 @ 06:59]    TPro  7.1  /  Alb  4.4  /  TBili  0.4  /  DBili  x   /  AST  19  /  ALT  24  /  AlkPhos  54  [10-09-21 @ 06:59]    PT/INR: PT 11.3 , INR 0.94       [10-08-21 @ 12:12]  PTT: 29.0       [10-08-21 @ 12:12]      Creatinine Trend:  SCr 2.45 [10-10 @ 06:55]  SCr 4.33 [10-09 @ 06:59]  SCr 5.52 [10-08 @ 21:05]  SCr 6.97 [10-08 @ 12:07]    Urinalysis - [10-08-21 @ 20:19]      Color Colorless / Appearance Clear / SG 1.009 / pH 5.0      Gluc Negative / Ketone Negative  / Bili Negative / Urobili Negative       Blood Trace / Protein Negative / Leuk Est Negative / Nitrite Negative      RBC 1 / WBC 1 / Hyaline 2 / Gran  / Sq Epi  / Non Sq Epi 0 / Bacteria 0.0    Urine Creatinine 63      [10-08-21 @ 20:19]  Urine Urea Nitrogen 458      [10-09-21 @ 01:02]  Urine Osmolality 376      [10-08-21 @ 20:19]    HbA1c 5.0      [02-02-19 @ 06:58]    HCV 0.11, Nonreact      [10-09-21 @ 09:59]

## 2021-10-10 NOTE — PROGRESS NOTE ADULT - ASSESSMENT
77 yo M PMHx HTN, HLD, DIOGO, ETOH use disorder, previous spinal surgeries, recent dx of liver neuroendocrine tumor started lanreotide last week p/w weakness, dizziness, and SOB adm with RAZIA/ATN and acute metabolic acidosis.

## 2021-10-11 ENCOUNTER — NON-APPOINTMENT (OUTPATIENT)
Age: 76
End: 2021-10-11

## 2021-10-11 DIAGNOSIS — R19.7 DIARRHEA, UNSPECIFIED: ICD-10-CM

## 2021-10-11 DIAGNOSIS — R21 RASH AND OTHER NONSPECIFIC SKIN ERUPTION: ICD-10-CM

## 2021-10-11 DIAGNOSIS — M79.605 PAIN IN LEFT LEG: ICD-10-CM

## 2021-10-11 LAB
ANION GAP SERPL CALC-SCNC: 14 MMOL/L — SIGNIFICANT CHANGE UP (ref 5–17)
BUN SERPL-MCNC: 73 MG/DL — HIGH (ref 7–23)
CALCIUM SERPL-MCNC: 9.6 MG/DL — SIGNIFICANT CHANGE UP (ref 8.4–10.5)
CHLORIDE SERPL-SCNC: 104 MMOL/L — SIGNIFICANT CHANGE UP (ref 96–108)
CO2 SERPL-SCNC: 22 MMOL/L — SIGNIFICANT CHANGE UP (ref 22–31)
CREAT SERPL-MCNC: 2 MG/DL — HIGH (ref 0.5–1.3)
GLUCOSE SERPL-MCNC: 113 MG/DL — HIGH (ref 70–99)
POTASSIUM SERPL-MCNC: 4.5 MMOL/L — SIGNIFICANT CHANGE UP (ref 3.5–5.3)
POTASSIUM SERPL-SCNC: 4.5 MMOL/L — SIGNIFICANT CHANGE UP (ref 3.5–5.3)
SODIUM SERPL-SCNC: 140 MMOL/L — SIGNIFICANT CHANGE UP (ref 135–145)

## 2021-10-11 PROCEDURE — 99232 SBSQ HOSP IP/OBS MODERATE 35: CPT | Mod: GC

## 2021-10-11 PROCEDURE — 99233 SBSQ HOSP IP/OBS HIGH 50: CPT

## 2021-10-11 RX ORDER — CLOTRIMAZOLE AND BETAMETHASONE DIPROPIONATE 10; .5 MG/G; MG/G
1 CREAM TOPICAL
Refills: 0 | Status: DISCONTINUED | OUTPATIENT
Start: 2021-10-11 | End: 2021-10-12

## 2021-10-11 RX ORDER — ALLOPURINOL 300 MG
200 TABLET ORAL
Refills: 0 | Status: DISCONTINUED | OUTPATIENT
Start: 2021-10-11 | End: 2021-10-12

## 2021-10-11 RX ADMIN — Medication 100 MEQ/KG/HR: at 15:25

## 2021-10-11 RX ADMIN — Medication 200 MILLIGRAM(S): at 17:17

## 2021-10-11 RX ADMIN — HEPARIN SODIUM 5000 UNIT(S): 5000 INJECTION INTRAVENOUS; SUBCUTANEOUS at 05:02

## 2021-10-11 RX ADMIN — Medication 100 MEQ/KG/HR: at 21:17

## 2021-10-11 RX ADMIN — ATORVASTATIN CALCIUM 80 MILLIGRAM(S): 80 TABLET, FILM COATED ORAL at 21:16

## 2021-10-11 RX ADMIN — HEPARIN SODIUM 5000 UNIT(S): 5000 INJECTION INTRAVENOUS; SUBCUTANEOUS at 21:17

## 2021-10-11 RX ADMIN — Medication 100 MEQ/KG/HR: at 05:01

## 2021-10-11 RX ADMIN — Medication 25 MILLIGRAM(S): at 21:16

## 2021-10-11 RX ADMIN — CLOTRIMAZOLE AND BETAMETHASONE DIPROPIONATE 1 APPLICATION(S): 10; .5 CREAM TOPICAL at 17:17

## 2021-10-11 RX ADMIN — HEPARIN SODIUM 5000 UNIT(S): 5000 INJECTION INTRAVENOUS; SUBCUTANEOUS at 15:25

## 2021-10-11 NOTE — PROGRESS NOTE ADULT - SUBJECTIVE AND OBJECTIVE BOX
DATE OF SERVICE: 10-11-21    Patient is a 76y old  Male who presents with a chief complaint of RAZIA (11 Oct 2021 16:46)      INTERVAL HISTORY: feels ok  	  MEDICATIONS:  metoprolol succinate ER 25 milliGRAM(s) Oral daily        PHYSICAL EXAM:  T(C): 36.4 (10-11-21 @ 21:03), Max: 36.7 (10-11-21 @ 16:06)  HR: 74 (10-11-21 @ 21:03) (71 - 82)  BP: 112/70 (10-11-21 @ 21:03) (103/63 - 138/73)  RR: 16 (10-11-21 @ 21:03) (16 - 18)  SpO2: 97% (10-11-21 @ 21:03) (96% - 99%)  Wt(kg): --  I&O's Summary    10 Oct 2021 07:01  -  11 Oct 2021 07:00  --------------------------------------------------------  IN: 1740 mL / OUT: 1050 mL / NET: 690 mL    11 Oct 2021 07:01  -  11 Oct 2021 22:23  --------------------------------------------------------  IN: 1110 mL / OUT: 0 mL / NET: 1110 mL          Appearance: In no distress	  HEENT:    PERRL, EOMI	  Cardiovascular:  S1 S2, No JVD  Respiratory: Lungs clear to auscultation	  Gastrointestinal:  Soft, Non-tender, + BS	  Vascularature:  No edema of LE  Psychiatric: Appropriate affect   Neuro: no acute focal deficits           10-11    140  |  104  |  73<H>  ----------------------------<  113<H>  4.5   |  22  |  2.00<H>    Ca    9.6      11 Oct 2021 07:42          Labs personally reviewed      ASSESSMENT/PLAN: 	  77 yo M PMHx HTN, HLD, DIOGO, ETOH use disorder, previous spinal surgeries, recent dx of liver neuroendocrine tumor started lanreotide last week p/w weakness, dizziness, and SOB adm with RAZIA/ATN and acute metabolic acidosis.       Problem/Plan - 1:  ·  Problem: Elevated troponin.   ·  Plan: ekg without ischemic change. likely non-MI troponin elevation 2/2 RAZIA  trop peaked.       Problem/Plan - 2:  ·  Problem: Hypotension- resolved  ·  Plan: d/t diuretic use & diarrhea causing hypotension  - hold torsemide- resume when creatinine drops under 2  - daily weights  -i's & o's  - ivf as above  - monitor bp.       Problem/Plan - 3:  ·  Problem: RAZIA (acute kidney injury).   ·  Plan: likely ATN 2/2 hypotension and pre-renal RAZIA in setting of volume depletion diarrhea d/t lanreotide side effect  - hold lanreotide  - C/w current bicarb drip; nephro in agreement.   - bladder scan for PVR and renal US pending.   - monitor uop and hemodynamics     Problem/Plan - 4:  ·  Problem: Prophylactic measure.   ·  Plan: dvt ppx: hsq.  -BLLE dopplers negative for DVT        Hank Hayes DO [facility]  Cardiovascular Medicine  [address], [address]  Office: [phone]  Cell: [phone]

## 2021-10-11 NOTE — PROGRESS NOTE ADULT - PROBLEM SELECTOR PLAN 1
likely ATN 2/2 hypotension and pre-renal RAZIA in setting of volume depletion diarrhea d/t lanreotide side effect  - hold lanreotide  - C/w current bicarb drip per ggt  - Renal US:  8 mm left intrarenal stone.  No evidence of hydronephrosis.  - cr improving 2.0, but not at baseline yet  - monitor bmp likely ATN 2/2 hypotension and pre-renal RAZIA in setting of volume depletion diarrhea d/t lanreotide side effect  - hold lanreotide  - C/w current bicarb drip per nephro  - Renal US:  8 mm left intrarenal stone.  No evidence of hydronephrosis.  - cr improving 2.0, but not at baseline yet  - monitor bmp

## 2021-10-11 NOTE — PROGRESS NOTE ADULT - SUBJECTIVE AND OBJECTIVE BOX
Patient is a 76y old  Male who presents with a chief complaint of RAZIA (11 Oct 2021 08:22)      SUBJECTIVE / OVERNIGHT EVENTS: No ON events. Reports 1 episode of wattery in past 12-24 hours, seem to be improving. Complains of chronic b/ LE pain. complains of buttock rash that is itchy. Denies f/c, abd pain, n/v.        ADDITIONAL REVIEW OF SYSTEMS: Negative except for above    MEDICATIONS  (STANDING):  allopurinol 200 milliGRAM(s) Oral two times a day  atorvastatin 80 milliGRAM(s) Oral at bedtime  clotrimazole/betamethasone Cream 1 Application(s) Topical two times a day  heparin   Injectable 5000 Unit(s) SubCutaneous every 8 hours  metoprolol succinate ER 25 milliGRAM(s) Oral daily  sodium bicarbonate  Infusion 0.055 mEq/kG/Hr (100 mL/Hr) IV Continuous <Continuous>    MEDICATIONS  (PRN):  acetaminophen   Tablet .. 650 milliGRAM(s) Oral every 6 hours PRN Temp greater or equal to 38C (100.4F), Mild Pain (1 - 3)  aluminum hydroxide/magnesium hydroxide/simethicone Suspension 30 milliLiter(s) Oral every 4 hours PRN Dyspepsia  LORazepam   Injectable 1 milliGRAM(s) IV Push every 2 hours PRN CIWA-Ar score increase by 2 points and a total score of 7 or less  melatonin 3 milliGRAM(s) Oral at bedtime PRN Insomnia  ondansetron Injectable 4 milliGRAM(s) IV Push every 8 hours PRN Nausea and/or Vomiting      CAPILLARY BLOOD GLUCOSE        I&O's Summary    10 Oct 2021 07:01  -  11 Oct 2021 07:00  --------------------------------------------------------  IN: 1740 mL / OUT: 1050 mL / NET: 690 mL    11 Oct 2021 07:01  -  11 Oct 2021 16:48  --------------------------------------------------------  IN: 810 mL / OUT: 0 mL / NET: 810 mL        PHYSICAL EXAM:  Vital Signs Last 24 Hrs  T(C): 36.7 (11 Oct 2021 16:06), Max: 36.7 (11 Oct 2021 16:06)  T(F): 98 (11 Oct 2021 16:06), Max: 98 (11 Oct 2021 16:06)  HR: 73 (11 Oct 2021 16:06) (71 - 82)  BP: 118/74 (11 Oct 2021 16:06) (100/65 - 148/62)  BP(mean): --  RR: 16 (11 Oct 2021 16:06) (16 - 18)  SpO2: 96% (11 Oct 2021 16:06) (96% - 99%)    PHYSICAL EXAM:  GENERAL: NAD, well-developed   HEAD:  Atraumatic, Normocephalic  NECK: Supple, No JVD  CHEST/LUNG: Clear to auscultation bilaterally; No wheeze  HEART: Regular rate and rhythm;   ABDOMEN: Soft, Nontender, Nondistended; Bowel sounds present  EXTREMITIES:  2+ Peripheral Pulses, No clubbing, cyanosis, or edema  PSYCH: AAOx3  NEUROLOGY: non-focal  SKin: fungal rash in buttock crease      LABS:    10-11    140  |  104  |  73<H>  ----------------------------<  113<H>  4.5   |  22  |  2.00<H>    Ca    9.6      11 Oct 2021 07:42                Culture - Urine (collected 08 Oct 2021 18:25)  Source: Clean Catch Clean Catch (Midstream)  Final Report (09 Oct 2021 14:38):    <10,000 CFU/mL Normal Urogenital [place]        RADIOLOGY & ADDITIONAL TESTS:    Imaging Personally Reviewed:    Electrocardiogram Personally Reviewed:    COORDINATION OF CARE:  Care Discussed with Consultants/Other Providers [Y/N]:  Prior or Outpatient Records Reviewed [Y/N]:

## 2021-10-11 NOTE — PROGRESS NOTE ADULT - ASSESSMENT
75 yo M PMHx HTN, HLD, DIOGO, previous spinal surgeries, recent dx of liver neuroendocrine tumor, started lanreotide last week, presents for worsening SOB. Normally states he can walk several blocks but now, SOB after 50 feet. Pt also has been having daily, non bloody watery diarrhea since receiving lanreotide 1 week ago. His leg swelling is the same. He denies abd pain, fevers, chills, dysuria, chest pain, dizziness. Nephrology called for RAZIA.             RAZIA on CKD-  Razia likely in the setting of ATN from hypotension & ongoing GI losses. Upon review of [facility] pt's baseline SCr is 1.2-1.3 in 2020 up until 8/30/21. On admission SCr is 6.97 with anion gap metabolic acidosis. SCr improved to 2.4 with IVF. Continue with 1/2 NS with 75 meq of bicarb at current rate for today. Send UA, urine electrolytes, spot urine TP/CR. No evidence of urinary retention; monitor UOP. Check Renal US. Recommend Ivory catheter placement if retaining. Will need to consider HD if renal failure continues to worsen. Monitor labs and urine output. Avoid NSAIDs, ACEI/ARBS, RCA and nephrotoxins. Dose medications as per eGFR.      Primary Respiratory acidosis with secondary metabolic acidosis- improving  Lactate wnl  Due to continued bicarb losses from diarrhea; agree with bicarb gtt for now.   f/u BMP  Respiratory acidosis- per primary team                If you have any questions, please feel free to contact me  [employer]  Nephrology Fellow  Pager NS: [phone]/ [facility]: [phone]    (After 5 pm or on weekends please page the on-call fellow, can check [email or web address] for schedule. Login is salvatore ching, schedule under [facility] medicine, psych, derm)     77 yo M PMHx HTN, HLD, DIOGO, previous spinal surgeries, recent dx of liver neuroendocrine tumor, started lanreotide last week, presents for worsening SOB. Normally states he can walk several blocks but now, SOB after 50 feet. Pt also has been having daily, non bloody watery diarrhea since receiving lanreotide 1 week ago. His leg swelling is the same. He denies abd pain, fevers, chills, dysuria, chest pain, dizziness. Nephrology called for RAZIA.             RAZIA on CKD-  Razia likely in the setting of ATN from hypotension & ongoing GI losses. Upon review of [facility] pt's baseline SCr is 1.2-1.3 in 2020 up until 8/30/21. On admission SCr is 6.97 with anion gap metabolic acidosis. SCr improved to 2.4 with IVF. Continue with 1/2 NS with 75 meq of bicarb at current rate for today. Send UA, urine electrolytes, spot urine TP/CR. No evidence of urinary retention; monitor UOP. Check Renal US. Recommend Ivory catheter placement if retaining. Will need to consider HD if renal failure continues to worsen. Monitor labs and urine output. Avoid NSAIDs, ACEI/ARBS, RCA and nephrotoxins. Dose medications as per eGFR.      Primary Respiratory acidosis with secondary metabolic acidosis (AGMA + NAGMA)- improving  Lactate wnl  Due to continued bicarb losses from diarrhea; agree with bicarb gtt for now.   f/u BMP  Respiratory acidosis- per primary team                If you have any questions, please feel free to contact me  [employer]  Nephrology Fellow  Pager NS: [phone]/ [facility]: [phone]    (After 5 pm or on weekends please page the on-call fellow, can check [email or web address] for schedule. Login is salvatore ching, schedule under [facility] medicine, psych, derm)

## 2021-10-11 NOTE — PROGRESS NOTE ADULT - SUBJECTIVE AND OBJECTIVE BOX
[facility] Division of Kidney Diseases & Hypertension  FOLLOW UP NOTE  [phone]--------------------------------------------------------------------------------  Chief Complaint:Acute renal failure        24 hour events/subjective: Patient seen & examined. Labs & vitals reviewed. Admits to diarrhea this am but non bloody.  UO in the last 24 hours 1L        PAST HISTORY  --------------------------------------------------------------------------------  No significant changes to PMH, PSH, FHx, SHx, unless otherwise noted    ALLERGIES & MEDICATIONS  --------------------------------------------------------------------------------  Allergies    No Known Allergies    Intolerances      Standing Inpatient Medications  atorvastatin 80 milliGRAM(s) Oral at bedtime  heparin   Injectable 5000 Unit(s) SubCutaneous every 8 hours  metoprolol succinate ER 25 milliGRAM(s) Oral daily  sodium bicarbonate  Infusion 0.055 mEq/kG/Hr IV Continuous <Continuous>    PRN Inpatient Medications  acetaminophen   Tablet .. 650 milliGRAM(s) Oral every 6 hours PRN  aluminum hydroxide/magnesium hydroxide/simethicone Suspension 30 milliLiter(s) Oral every 4 hours PRN  LORazepam   Injectable 1 milliGRAM(s) IV Push every 2 hours PRN  melatonin 3 milliGRAM(s) Oral at bedtime PRN  ondansetron Injectable 4 milliGRAM(s) IV Push every 8 hours PRN      REVIEW OF SYSTEMS  --------------------------------------------------------------------------------  Gen: No  fevers/chills  Respiratory: No dyspnea, cough  CV: No chest pain  GI: No abdominal pain, +diarrhea,  no nausea, vomiting  : No increased frequency, dysuria, hematuria  MSK:  no edema  Neuro: No dizziness/lightheadedness      All other systems were reviewed and are negative, except as noted.    VITALS/PHYSICAL EXAM  --------------------------------------------------------------------------------  T(C): 36.6 (10-11-21 @ 07:55), Max: 36.8 (10-10-21 @ 10:13)  HR: 82 (10-11-21 @ 07:55) (67 - 82)  BP: 138/73 (10-11-21 @ 07:55) (100/65 - 148/62)  RR: 17 (10-11-21 @ 07:55) (17 - 18)  SpO2: 98% (10-11-21 @ 07:55) (96% - 99%)  Wt(kg): --        10-10-21 @ 07:01  -  10-11-21 @ 07:00  --------------------------------------------------------  IN: 1740 mL / OUT: 1050 mL / NET: 690 mL      Physical Exam:  	Gen: NAD  	HEENT: MMM  	Pulm: CTA B/L  	CV: S1S2  	Abd: Soft, +BS   	Ext: ++ LE edema B/L  	Neuro: Awake  	Skin: Warm and dry  	Vascular access: N/A    LABS/STUDIES  --------------------------------------------------------------------------------    141  |  103  |  93  ----------------------------<  122      [10-10-21 @ 06:55]  4.2   |  21  |  2.45        Ca     9.8     [10-10-21 @ 06:55]            Creatinine Trend:  SCr 2.45 [10-10 @ 06:55]  SCr 4.33 [10-09 @ 06:59]  SCr 5.52 [10-08 @ 21:05]  SCr 6.97 [10-08 @ 12:07]    Urinalysis - [10-08-21 @ 20:19]      Color Colorless / Appearance Clear / SG 1.009 / pH 5.0      Gluc Negative / Ketone Negative  / Bili Negative / Urobili Negative       Blood Trace / Protein Negative / Leuk Est Negative / Nitrite Negative      RBC 1 / WBC 1 / Hyaline 2 / Gran  / Sq Epi  / Non Sq Epi 0 / Bacteria 0.0    Urine Creatinine 63      [10-08-21 @ 20:19]  Urine Urea Nitrogen 458      [10-09-21 @ 01:02]  Urine Osmolality 376      [10-08-21 @ 20:19]      HCV 0.11, Nonreact      [10-09-21 @ 09:59]

## 2021-10-11 NOTE — PROVIDER CONTACT NOTE (CHANGE IN STATUS NOTIFICATION) - ASSESSMENT
pt c/o ankle pain 6/10  noted to have trace of swelling in right ankle   pt stating uncertainty of pain being caused from missing doses of allopurinol

## 2021-10-12 ENCOUNTER — TRANSCRIPTION ENCOUNTER (OUTPATIENT)
Age: 76
End: 2021-10-12

## 2021-10-12 VITALS
SYSTOLIC BLOOD PRESSURE: 125 MMHG | HEART RATE: 87 BPM | TEMPERATURE: 98 F | RESPIRATION RATE: 18 BRPM | DIASTOLIC BLOOD PRESSURE: 72 MMHG | OXYGEN SATURATION: 97 %

## 2021-10-12 LAB
ANION GAP SERPL CALC-SCNC: 13 MMOL/L — SIGNIFICANT CHANGE UP (ref 5–17)
BUN SERPL-MCNC: 50 MG/DL — HIGH (ref 7–23)
CALCIUM SERPL-MCNC: 9.5 MG/DL — SIGNIFICANT CHANGE UP (ref 8.4–10.5)
CHLORIDE SERPL-SCNC: 104 MMOL/L — SIGNIFICANT CHANGE UP (ref 96–108)
CO2 SERPL-SCNC: 23 MMOL/L — SIGNIFICANT CHANGE UP (ref 22–31)
CREAT SERPL-MCNC: 1.65 MG/DL — HIGH (ref 0.5–1.3)
GLUCOSE SERPL-MCNC: 117 MG/DL — HIGH (ref 70–99)
POTASSIUM SERPL-MCNC: 4.4 MMOL/L — SIGNIFICANT CHANGE UP (ref 3.5–5.3)
POTASSIUM SERPL-SCNC: 4.4 MMOL/L — SIGNIFICANT CHANGE UP (ref 3.5–5.3)
SODIUM SERPL-SCNC: 140 MMOL/L — SIGNIFICANT CHANGE UP (ref 135–145)
URATE SERPL-MCNC: 6.8 MG/DL — SIGNIFICANT CHANGE UP (ref 3.4–8.8)

## 2021-10-12 PROCEDURE — 99239 HOSP IP/OBS DSCHRG MGMT >30: CPT

## 2021-10-12 PROCEDURE — 99232 SBSQ HOSP IP/OBS MODERATE 35: CPT

## 2021-10-12 PROCEDURE — 99222 1ST HOSP IP/OBS MODERATE 55: CPT | Mod: GC

## 2021-10-12 PROCEDURE — 99222 1ST HOSP IP/OBS MODERATE 55: CPT

## 2021-10-12 PROCEDURE — 93925 LOWER EXTREMITY STUDY: CPT | Mod: 26

## 2021-10-12 RX ORDER — LANREOTIDE ACETATE 60 MG/.2ML
0 INJECTION SUBCUTANEOUS
Qty: 0 | Refills: 0 | DISCHARGE

## 2021-10-12 RX ORDER — CLOTRIMAZOLE AND BETAMETHASONE DIPROPIONATE 10; .5 MG/G; MG/G
1 CREAM TOPICAL
Qty: 1 | Refills: 0
Start: 2021-10-12 | End: 2021-11-10

## 2021-10-12 RX ORDER — LANOLIN ALCOHOL/MO/W.PET/CERES
1 CREAM (GRAM) TOPICAL
Qty: 30 | Refills: 0
Start: 2021-10-12 | End: 2021-11-10

## 2021-10-12 RX ADMIN — CLOTRIMAZOLE AND BETAMETHASONE DIPROPIONATE 1 APPLICATION(S): 10; .5 CREAM TOPICAL at 17:57

## 2021-10-12 RX ADMIN — Medication 200 MILLIGRAM(S): at 17:56

## 2021-10-12 RX ADMIN — HEPARIN SODIUM 5000 UNIT(S): 5000 INJECTION INTRAVENOUS; SUBCUTANEOUS at 13:01

## 2021-10-12 RX ADMIN — HEPARIN SODIUM 5000 UNIT(S): 5000 INJECTION INTRAVENOUS; SUBCUTANEOUS at 05:12

## 2021-10-12 RX ADMIN — Medication 200 MILLIGRAM(S): at 05:14

## 2021-10-12 RX ADMIN — CLOTRIMAZOLE AND BETAMETHASONE DIPROPIONATE 1 APPLICATION(S): 10; .5 CREAM TOPICAL at 05:13

## 2021-10-12 NOTE — CONSULT NOTE ADULT - SUBJECTIVE AND OBJECTIVE BOX
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 Given patient's history of well-differentiated neuroendocrine tumor on treatment, Oncology was consulted for further evaluation. Pt now w/o N/V/D, palp/ sob/dyspnea/ cp, or F/C/S.   Wound consult requested to assist w/ management of buttock wound.  Pt c/o pruritis /pain, but no drainage, odor, color change in between buttock cheeks.  Pt had been Increasingly sedentary 2/2 to hospitalization, feeling warm.  Pt wearing non cotton pants.  Pt denies being incontinent of urine & stool.  PMD initiated clotrimazole/betamethasone Cream with noted relief.  Offloading and pericare noted.  Appetite good w/o weight loss.     Current Diet: Diet, Renal Restrictions:   For patients receiving Renal Replacement - No Protein Restr, No Conc K, No Conc Phos, Low Sodium (10-08-21 @ 15:53)      PAST MEDICAL & SURGICAL HISTORY:  Hypertension    Hypercholesterolemia    PAD (peripheral artery disease)    Neuroendocrine carcinoma    S/P knee replacement, bilateral    S/P Rt hip replacement    s/p of hernia repair    s/p laminectomy/ lumbosacral spine surgery      REVIEW OF SYSTEMS: General/ Skin: see HPI  All other systems negative    MEDICATIONS  (STANDING):  allopurinol 200 milliGRAM(s) Oral two times a day  atorvastatin 80 milliGRAM(s) Oral at bedtime  clotrimazole/betamethasone Cream 1 Application(s) Topical two times a day  heparin   Injectable 5000 Unit(s) SubCutaneous every 8 hours  metoprolol succinate ER 25 milliGRAM(s) Oral daily    MEDICATIONS  (PRN):  acetaminophen   Tablet .. 650 milliGRAM(s) Oral every 6 hours PRN Temp greater or equal to 38C (100.4F), Mild Pain (1 - 3)  aluminum hydroxide/magnesium hydroxide/simethicone Suspension 30 milliLiter(s) Oral every 4 hours PRN Dyspepsia  LORazepam   Injectable 1 milliGRAM(s) IV Push every 2 hours PRN CIWA-Ar score increase by 2 points and a total score of 7 or less  melatonin 3 milliGRAM(s) Oral at bedtime PRN Insomnia  ondansetron Injectable 4 milliGRAM(s) IV Push every 8 hours PRN Nausea and/or Vomiting    No Known Allergies    SOCIAL HISTORY: ; Former smoker, Denies smoking, ETOH, drugs    FAMILY HISTORY: No pertinent family history in first degree relatives      PHYSICAL EXAM:  Vital Signs Last 24 Hrs  T(C): 36.9 (12 Oct 2021 16:21), Max: 36.9 (11 Oct 2021 23:52)  T(F): 98.4 (12 Oct 2021 16:21), Max: 98.4 (11 Oct 2021 23:52)  HR: 87 (12 Oct 2021 16:21) (72 - 87)  BP: 125/72 (12 Oct 2021 16:21) (112/70 - 126/74)  BP(mean): --  RR: 18 (12 Oct 2021 16:21) (16 - 18)  SpO2: 97% (12 Oct 2021 16:21) (95% - 98%)    NAD / gaurded but stable,  A&Ox3/ Alert/ Confused  cachectic/ MO/ Obese/ frail  WD/ WN/ WG/ Disheveled  Total Care Sport/ Versa Care P500 bed/ Envella     HEENT:  NC/AT, PERRL, EOMI, mucosa moist, throat clear, trachea midline, neck supple    Cardiovascular: RRR (+)m    Respiratory: CTA    Gastrointestinal soft NT/ND (+)BS  (+)PEG (+)ostomy    Neurology  weakened strength & sensation grossly intact/ paraesthesia  nonverbal, no follow commands/ paraplegic    Musculoskeletal:  limited/ FROM, no deformities/ contractures    Vascular: BLE equally warm/ cool,  no cyanosis, clubbing, edema  >LE //BLE edema equal  DP/PT pulses palpable  no acute ischemia noted  hemosiderin staining    Skin:  moist w/ good turgor  frail,  ecchymosis w/o hematoma  serosanguinous drainage  No odor, erythema, increased warmth, tenderness, induration, fluctuance    LABS/ CULTURES/ RADIOLOGY:      140  |  104  |  50  ----------------------------<  117      [10-12-21 @ 06:36]  4.4   |  23  |  1.65        Ca     9.5     [10-12-21 @ 06:36]      Phos  6.2     [10-09-21 @ 06:59]           Wound SURGERY CONSULT NOTE    HPI:  77 y/o M w/ a PMHx of HTN, HLD, DIOGO, ETOH use disorder, previous spinal surgeries, and 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 Given patient's history of well-differentiated neuroendocrine tumor on treatment, Oncology was consulted for further evaluation. Pt now w/o N/V/D, palp/ sob/dyspnea/ cp, or F/C/S.   Wound consult requested to assist w/ management of buttock wound.  Pt c/o pruritis /pain, but no drainage, odor, color change in between buttock cheeks.  Pt had been Increasingly sedentary 2/2 to hospitalization, feeling warm.  Pt wearing non cotton pants.  Pt denies being incontinent of urine & stool.  PMD initiated clotrimazole/betamethasone Cream with noted relief.  Offloading and pericare noted.  Appetite good w/o weight loss.     Current Diet: Diet, Renal Restrictions:   For patients receiving Renal Replacement - No Protein Restr, No Conc K, No Conc Phos, Low Sodium (10-08-21 @ 15:53)      PAST MEDICAL & SURGICAL HISTORY:  Hypertension    Hypercholesterolemia    PAD (peripheral artery disease)    Neuroendocrine carcinoma    S/P knee replacement, bilateral    S/P Rt hip replacement    s/p of hernia repair    s/p laminectomy/ lumbosacral spine surgery      REVIEW OF SYSTEMS: General/ Skin: see HPI  All other systems negative    MEDICATIONS  (STANDING):  allopurinol 200 milliGRAM(s) Oral two times a day  atorvastatin 80 milliGRAM(s) Oral at bedtime  clotrimazole/betamethasone Cream 1 Application(s) Topical two times a day  heparin   Injectable 5000 Unit(s) SubCutaneous every 8 hours  metoprolol succinate ER 25 milliGRAM(s) Oral daily    MEDICATIONS  (PRN):  acetaminophen   Tablet .. 650 milliGRAM(s) Oral every 6 hours PRN Temp greater or equal to 38C (100.4F), Mild Pain (1 - 3)  aluminum hydroxide/magnesium hydroxide/simethicone Suspension 30 milliLiter(s) Oral every 4 hours PRN Dyspepsia  LORazepam   Injectable 1 milliGRAM(s) IV Push every 2 hours PRN CIWA-Ar score increase by 2 points and a total score of 7 or less  melatonin 3 milliGRAM(s) Oral at bedtime PRN Insomnia  ondansetron Injectable 4 milliGRAM(s) IV Push every 8 hours PRN Nausea and/or Vomiting    No Known Allergies    SOCIAL HISTORY: ; Denies smoking, ETOH, drugs    FAMILY HISTORY: No pertinent family history in first degree relatives      PHYSICAL EXAM:  Vital Signs Last 24 Hrs  T(C): 36.9 (12 Oct 2021 16:21), Max: 36.9 (11 Oct 2021 23:52)  T(F): 98.4 (12 Oct 2021 16:21), Max: 98.4 (11 Oct 2021 23:52)  HR: 87 (12 Oct 2021 16:21) (72 - 87)  BP: 125/72 (12 Oct 2021 16:21) (112/70 - 126/74)  BP(mean): --  RR: 18 (12 Oct 2021 16:21) (16 - 18)  SpO2: 97% (12 Oct 2021 16:21) (95% - 98%)    NAD / gaurded but stable,  A&Ox3/ Alert/ Confused  cachectic/ MO/ Obese/ frail  WD/ WN/ WG/ Disheveled  Total Care Sport/ Versa Care P500 bed/ Envella     HEENT:  NC/AT, PERRL, EOMI, mucosa moist, throat clear, trachea midline, neck supple    Cardiovascular: RRR (+)m    Respiratory: CTA    Gastrointestinal soft NT/ND (+)BS  (+)PEG (+)ostomy    Neurology  weakened strength & sensation grossly intact/ paraesthesia  nonverbal, no follow commands/ paraplegic    Musculoskeletal:  limited/ FROM, no deformities/ contractures    Vascular: BLE equally warm/ cool,  no cyanosis, clubbing, edema  >LE //BLE edema equal  DP/PT pulses palpable  no acute ischemia noted  hemosiderin staining    Skin:  moist w/ good turgor  frail,  ecchymosis w/o hematoma  serosanguinous drainage  No odor, erythema, increased warmth, tenderness, induration, fluctuance    LABS/ CULTURES/ RADIOLOGY:      140  |  104  |  50  ----------------------------<  117      [10-12-21 @ 06:36]  4.4   |  23  |  1.65        Ca     9.5     [10-12-21 @ 06:36]      Phos  6.2     [10-09-21 @ 06:59]

## 2021-10-12 NOTE — PROGRESS NOTE ADULT - PROBLEM SELECTOR PLAN 9
dvt ppx: hsq    discussed with ESTEFANI Brown
dvt ppx: hsq    discussed with ESTEFANI Boyce and DR Hameed    Dispo: d/c home today after vascular surgery eval, outpatient pcp, vascular, wound care, and heme onc followup, outpatient bmp within 5 days    spent 45 min on d/c time

## 2021-10-12 NOTE — PROGRESS NOTE ADULT - PROBLEM SELECTOR PLAN 7
ekg without ischemic change. likely non-MI troponin elevation 2/2 RAZIA  trop peaked.  no anginal equivalents on exam.  per cards stable for d/c
ekg without ischemic change. likely non-MI troponin elevation 2/2 RAZIA  trop peaked.  no anginal equivalents on exam.

## 2021-10-12 NOTE — PROGRESS NOTE ADULT - PROBLEM SELECTOR PLAN 5
nonspecific chronic pain in b/l LE: likely multifactorial  -not consistent with gout attack, c/w home allopurinol, uric acid wnl  -restart diuretic as above  -LE duplex negative for DVt  -arterial duplex with isolated Occlusion of the distal left posterior tibial artery  -no s/s of ischemia on physical exam  -discussed with DR Hameed who rec vascular consult who will see patient shortly
d/t diuretic use & diarrhea causing hypotension  - hold torsemide  - ivf as above  - monitor bp
nonspecific chronic pain in b/l LE   LE duplex negative for DVt  -arterial duplex pending although no sign of ischemic on exam  -?gout attack, restart home allopurinol, send uric acid level
d/t diuretic use & diarrhea causing hypotension  - hold torsemide  - ivf as above  - monitor bp

## 2021-10-12 NOTE — CONSULT NOTE ADULT - SUBJECTIVE AND OBJECTIVE BOX
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 Given patient's history of well-differentiated neuroendocrine tumor on treatment, Oncology was consulted for further evaluation.        Oncologic History:  Patient underwent a CT Chest in 7/2021 which was notable for fatty liver and superimposed new nodules versus nodular sparing. This prompted an MRI Abdomen which showed numerous hyperenhancing liver metastases. He then had a PET/CT which showed no evidence of FDG-avid disease. A liver biopsy was then performed which revealed metastatic well differentiated metastatic neuroendocrine CA (Ki index ~ 20%). In 9/2021, patient underwent a PET/Dotatate scan which showed numerous DOTATATE-avid, non FDG-avid, bilobar hepatic metastases. It also showed DOTATATE-avid, non FDG-avid, lymph node adjacent to dorsal pancreatic body which was compatible with metastasis. A primary lesion was not identified. Pancreatic polypeptide was found to be elevated (> 2000). All other functional biochemicals are negative. Patient was started on Lanreotide and he received his first injection on. Patient follows with Dr. Stephon Webster as an outpatient.     PAST MEDICAL & SURGICAL HISTORY:  Hypertension    Hypercholesterolemia    PAD (peripheral artery disease)    Neuroendocrine carcinoma    S/P knee replacement, bilateral    S/P hip replacement  right hip    History of hernia repair    H/O laminectomy    History of lumbosacral spine surgery        Review of Systems:   CONSTITUTIONAL: No fever or chills  EYES: No eye pain or discharge  ENMT:  No sinus or throat pain  NECK: No pain or stiffness  RESPIRATORY: No shortness of breath or cough  CARDIOVASCULAR: No chest pain or palpitations  GASTROINTESTINAL: No vomiting or abdominal pain  GENITOURINARY: No dysuria or hematuria  NEUROLOGICAL: No headaches or confusion  SKIN: No itching or rash  MUSCULOSKELETAL: No joint pain or back pain    Allergies    No Known Allergies    Intolerances    Social History: Drinks a few glasses of wine daily, No smoking or illicit drug use    FAMILY HISTORY:  No pertinent family history in first degree relatives        MEDICATIONS  (STANDING):  allopurinol 200 milliGRAM(s) Oral two times a day  atorvastatin 80 milliGRAM(s) Oral at bedtime  clotrimazole/betamethasone Cream 1 Application(s) Topical two times a day  heparin   Injectable 5000 Unit(s) SubCutaneous every 8 hours  metoprolol succinate ER 25 milliGRAM(s) Oral daily    MEDICATIONS  (PRN):  acetaminophen   Tablet .. 650 milliGRAM(s) Oral every 6 hours PRN Temp greater or equal to 38C (100.4F), Mild Pain (1 - 3)  aluminum hydroxide/magnesium hydroxide/simethicone Suspension 30 milliLiter(s) Oral every 4 hours PRN Dyspepsia  LORazepam   Injectable 1 milliGRAM(s) IV Push every 2 hours PRN CIWA-Ar score increase by 2 points and a total score of 7 or less  melatonin 3 milliGRAM(s) Oral at bedtime PRN Insomnia  ondansetron Injectable 4 milliGRAM(s) IV Push every 8 hours PRN Nausea and/or Vomiting      CAPILLARY BLOOD GLUCOSE        I&O's Summary    11 Oct 2021 07:01  -  12 Oct 2021 07:00  --------------------------------------------------------  IN: 1110 mL / OUT: 500 mL / NET: 610 mL    12 Oct 2021 07:01  -  12 Oct 2021 10:29  --------------------------------------------------------  IN: 180 mL / OUT: 0 mL / NET: 180 mL    Vital Signs Last 24 Hrs  T(C): 36.4 (12 Oct 2021 08:31), Max: 36.9 (11 Oct 2021 23:52)  T(F): 97.6 (12 Oct 2021 08:31), Max: 98.4 (11 Oct 2021 23:52)  HR: 79 (12 Oct 2021 08:31) (72 - 79)  BP: 117/74 (12 Oct 2021 08:31) (112/70 - 124/74)  BP(mean): --  RR: 18 (12 Oct 2021 08:31) (16 - 18)  SpO2: 95% (12 Oct 2021 08:31) (95% - 98%)    PHYSICAL EXAM:  GENERAL: NAD  HEENT: NC/AT, MMM  NECK: Supple  CHEST/LUNG: Respirations grossly nonlabored  HEART: RRR; +S1/S2  ABDOMEN: +BS, Soft, NT  EXTREMITIES: No LE edema  NEUROLOGY: Awake and alert, Answering questions and following commands appropriately  SKIN: Warm and dry  PSYCH: Calm and cooperative    LABS:    10-12    140  |  104  |  50<H>  ----------------------------<  117<H>  4.4   |  23  |  1.65<H>    Ca    9.5      12 Oct 2021 06:36    RADIOLOGY & ADDITIONAL TESTS:  Studies reviewed.   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 Given patient's history of well-differentiated neuroendocrine tumor on treatment, Oncology was consulted for further evaluation.        Oncologic History:  Patient underwent a CT Chest in 7/2021 which was notable for fatty liver and superimposed new nodules versus nodular sparing. This prompted an MRI Abdomen which showed numerous hyperenhancing liver metastases. He then had a PET/CT which showed no evidence of FDG-avid disease. A liver biopsy was then performed which revealed metastatic well differentiated metastatic neuroendocrine CA (Ki index ~ 20%). In 9/2021, patient underwent a PET/Dotatate scan which showed numerous DOTATATE-avid, non FDG-avid, bilobar hepatic metastases. It also showed DOTATATE-avid, non FDG-avid, lymph node adjacent to dorsal pancreatic body which was compatible with metastasis. A primary lesion was not identified. Pancreatic polypeptide was found to be elevated (> 2000). All other functional biochemicals are negative. Patient was started on Lanreotide and he received his first injection on 9/28. Patient follows with Dr. Stephon Webster as an outpatient.     PAST MEDICAL & SURGICAL HISTORY:  Hypertension    Hypercholesterolemia    PAD (peripheral artery disease)    Neuroendocrine carcinoma    S/P knee replacement, bilateral    S/P hip replacement  right hip    History of hernia repair    H/O laminectomy    History of lumbosacral spine surgery        Review of Systems:   CONSTITUTIONAL: No fever or chills  EYES: No eye pain or discharge  ENMT:  No sinus or throat pain  NECK: No pain or stiffness  RESPIRATORY: No shortness of breath or cough  CARDIOVASCULAR: No chest pain or palpitations  GASTROINTESTINAL: No vomiting or abdominal pain  GENITOURINARY: No dysuria or hematuria  NEUROLOGICAL: No headaches or confusion  SKIN: No itching or rash  MUSCULOSKELETAL: No joint pain or back pain    Allergies    No Known Allergies    Intolerances    Social History: Drinks a few glasses of wine daily, No smoking or illicit drug use    FAMILY HISTORY:  No pertinent family history in first degree relatives        MEDICATIONS  (STANDING):  allopurinol 200 milliGRAM(s) Oral two times a day  atorvastatin 80 milliGRAM(s) Oral at bedtime  clotrimazole/betamethasone Cream 1 Application(s) Topical two times a day  heparin   Injectable 5000 Unit(s) SubCutaneous every 8 hours  metoprolol succinate ER 25 milliGRAM(s) Oral daily    MEDICATIONS  (PRN):  acetaminophen   Tablet .. 650 milliGRAM(s) Oral every 6 hours PRN Temp greater or equal to 38C (100.4F), Mild Pain (1 - 3)  aluminum hydroxide/magnesium hydroxide/simethicone Suspension 30 milliLiter(s) Oral every 4 hours PRN Dyspepsia  LORazepam   Injectable 1 milliGRAM(s) IV Push every 2 hours PRN CIWA-Ar score increase by 2 points and a total score of 7 or less  melatonin 3 milliGRAM(s) Oral at bedtime PRN Insomnia  ondansetron Injectable 4 milliGRAM(s) IV Push every 8 hours PRN Nausea and/or Vomiting      CAPILLARY BLOOD GLUCOSE        I&O's Summary    11 Oct 2021 07:01  -  12 Oct 2021 07:00  --------------------------------------------------------  IN: 1110 mL / OUT: 500 mL / NET: 610 mL    12 Oct 2021 07:01  -  12 Oct 2021 10:29  --------------------------------------------------------  IN: 180 mL / OUT: 0 mL / NET: 180 mL    Vital Signs Last 24 Hrs  T(C): 36.4 (12 Oct 2021 08:31), Max: 36.9 (11 Oct 2021 23:52)  T(F): 97.6 (12 Oct 2021 08:31), Max: 98.4 (11 Oct 2021 23:52)  HR: 79 (12 Oct 2021 08:31) (72 - 79)  BP: 117/74 (12 Oct 2021 08:31) (112/70 - 124/74)  BP(mean): --  RR: 18 (12 Oct 2021 08:31) (16 - 18)  SpO2: 95% (12 Oct 2021 08:31) (95% - 98%)    PHYSICAL EXAM:  GENERAL: NAD  HEENT: NC/AT, MMM  NECK: Supple  CHEST/LUNG: Respirations grossly nonlabored  HEART: RRR; +S1/S2  ABDOMEN: +BS, Soft, NT  EXTREMITIES: No LE edema  NEUROLOGY: Awake and alert, Answering questions and following commands appropriately  SKIN: Warm and dry  PSYCH: Calm and cooperative    LABS:    10-12    140  |  104  |  50<H>  ----------------------------<  117<H>  4.4   |  23  |  1.65<H>    Ca    9.5      12 Oct 2021 06:36    RADIOLOGY & ADDITIONAL TESTS:  Studies reviewed.   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 Given patient's history of well-differentiated neuroendocrine tumor on treatment, Oncology was consulted for further evaluation.    Patient seen this afternoon. He reports that his diarrhea is markedly improved currently. At time of visit, he reported that he had 1 BM yesterday and none today. He denies any complaints of chest pain, shortness of breath, or abdominal pain. Patient was afebrile overnight.    Oncologic History:  Patient underwent a CT Chest in 7/2021 which was notable for fatty liver and superimposed new nodules versus nodular sparing. This prompted an MRI Abdomen which showed numerous hyperenhancing liver metastases. He then had a PET/CT which showed no evidence of FDG-avid disease. A liver biopsy was then performed which revealed metastatic well differentiated metastatic neuroendocrine CA (Ki index ~ 20%). In 9/2021, patient underwent a PET/Dotatate scan which showed numerous DOTATATE-avid, non FDG-avid, bilobar hepatic metastases. It also showed DOTATATE-avid, non FDG-avid, lymph node adjacent to dorsal pancreatic body which was compatible with metastasis. A primary lesion was not identified. Pancreatic polypeptide was found to be elevated (> 2000). All other functional biochemicals are negative. Patient was started on Lanreotide and he received his first injection on 9/28. Patient follows with Dr. Stephon Webster as an outpatient.     PAST MEDICAL & SURGICAL HISTORY:  Hypertension    Hypercholesterolemia    PAD (peripheral artery disease)    Neuroendocrine carcinoma    S/P knee replacement, bilateral    S/P hip replacement  right hip    History of hernia repair    H/O laminectomy    History of lumbosacral spine surgery    Review of Systems:   CONSTITUTIONAL: + Generalized weakness (improved), No fever or chills  EYES: No eye pain or discharge  ENMT: No sinus or throat pain  NECK: No pain or stiffness  RESPIRATORY: + Shortness of breath (improved), No cough  CARDIOVASCULAR: No chest pain or palpitations  GASTROINTESTINAL: + Diarrhea (improved), No vomiting or abdominal pain  GENITOURINARY: No dysuria or hematuria  NEUROLOGICAL: + Dizziness (improved), No headaches  SKIN: No itching or rash  MUSCULOSKELETAL: No joint pain or back pain    Allergies    No Known Allergies    Intolerances    Social History: Drinks a few glasses of wine daily, No smoking or illicit drug use    FAMILY HISTORY:  No pertinent family history in first degree relatives        MEDICATIONS  (STANDING):  allopurinol 200 milliGRAM(s) Oral two times a day  atorvastatin 80 milliGRAM(s) Oral at bedtime  clotrimazole/betamethasone Cream 1 Application(s) Topical two times a day  heparin   Injectable 5000 Unit(s) SubCutaneous every 8 hours  metoprolol succinate ER 25 milliGRAM(s) Oral daily    MEDICATIONS  (PRN):  acetaminophen   Tablet .. 650 milliGRAM(s) Oral every 6 hours PRN Temp greater or equal to 38C (100.4F), Mild Pain (1 - 3)  aluminum hydroxide/magnesium hydroxide/simethicone Suspension 30 milliLiter(s) Oral every 4 hours PRN Dyspepsia  LORazepam   Injectable 1 milliGRAM(s) IV Push every 2 hours PRN CIWA-Ar score increase by 2 points and a total score of 7 or less  melatonin 3 milliGRAM(s) Oral at bedtime PRN Insomnia  ondansetron Injectable 4 milliGRAM(s) IV Push every 8 hours PRN Nausea and/or Vomiting      CAPILLARY BLOOD GLUCOSE        I&O's Summary    11 Oct 2021 07:01  -  12 Oct 2021 07:00  --------------------------------------------------------  IN: 1110 mL / OUT: 500 mL / NET: 610 mL    12 Oct 2021 07:01  -  12 Oct 2021 10:29  --------------------------------------------------------  IN: 180 mL / OUT: 0 mL / NET: 180 mL    Vital Signs Last 24 Hrs  T(C): 36.4 (12 Oct 2021 08:31), Max: 36.9 (11 Oct 2021 23:52)  T(F): 97.6 (12 Oct 2021 08:31), Max: 98.4 (11 Oct 2021 23:52)  HR: 79 (12 Oct 2021 08:31) (72 - 79)  BP: 117/74 (12 Oct 2021 08:31) (112/70 - 124/74)  BP(mean): --  RR: 18 (12 Oct 2021 08:31) (16 - 18)  SpO2: 95% (12 Oct 2021 08:31) (95% - 98%)    PHYSICAL EXAM:  GENERAL: NAD, Sitting in bed  HEENT: NC/AT, Sclera anicteric  NECK: Supple  CHEST/LUNG: Respirations grossly nonlabored  HEART: RRR; +S1/S2  ABDOMEN: +BS, Soft, NT  EXTREMITIES: No LE edema  NEUROLOGY: Awake and alert, Answering questions and following commands appropriately  SKIN: Warm and dry  PSYCH: Calm and cooperative    LABS:    10-12    140  |  104  |  50<H>  ----------------------------<  117<H>  4.4   |  23  |  1.65<H>    Ca    9.5      12 Oct 2021 06:36    RADIOLOGY & ADDITIONAL TESTS:  Studies reviewed.

## 2021-10-12 NOTE — PHYSICAL THERAPY INITIAL EVALUATION ADULT - ADDITIONAL COMMENTS
Pt lives with spouse in private home with 2 steps to enter and FOS with +chair lift inside. PTA, pt independent in all functional mobility with use of SAC.

## 2021-10-12 NOTE — DISCHARGE NOTE PROVIDER - NSDCMRMEDTOKEN_GEN_ALL_CORE_FT
allopurinol 100 mg oral tablet: 2 tab(s) orally 2 times a day  Crestor 20 mg oral tablet: 1 tab(s) orally once a day (at bedtime)  metoprolol succinate 25 mg oral tablet, extended release: 1 tab(s) orally once a day  potassium chloride 20 mEq oral tablet, extended release: 1 tab(s) orally 2 times a day  torsemide 20 mg oral tablet: 2 tab(s) orally 2 times a day  Vitamin D3 125 mcg (5000 intl units) oral capsule: 1 cap(s) orally once a day   allopurinol 100 mg oral tablet: 2 tab(s) orally 2 times a day  clotrimazole-betamethasone dipropionate 1%-0.05% topical cream: 1 application topically 2 times a day  Crestor 20 mg oral tablet: 1 tab(s) orally once a day (at bedtime)  melatonin 3 mg oral tablet: 1 tab(s) orally once a day (at bedtime), As needed, Insomnia  metoprolol succinate 25 mg oral tablet, extended release: 1 tab(s) orally once a day  potassium chloride 20 mEq oral tablet, extended release: 1 tab(s) orally 2 times a day  torsemide 20 mg oral tablet: 2 tab(s) orally 2 times a day  Vitamin D3 125 mcg (5000 intl units) oral capsule: 1 cap(s) orally once a day

## 2021-10-12 NOTE — PROGRESS NOTE ADULT - PROBLEM SELECTOR PLAN 1
likely ATN 2/2 hypotension and pre-renal RAZIA in setting of volume depletion from diarrhea d/t lanreotide side effect  - hold lanreotide on discharge per heme onc  - cr 1.6 approaching baseline, much improved  - repeat bmp within 5 days as PCP or oncologist office  - per cards, restart home torsemide 20mg bid with potassium as cr <2 and has some LE edema  - Renal US:  8 mm left intrarenal stone.  No evidence of hydronephrosis.  - renal cleared patient for discharge

## 2021-10-12 NOTE — PHYSICAL THERAPY INITIAL EVALUATION ADULT - PRECAUTIONS/LIMITATIONS, REHAB EVAL
IMAGING: US KIDNEY: 8 mm left intrarenal stone.  No evidence of hydronephrosis. UUS DOPPLER (-) for DVT CXR (-)

## 2021-10-12 NOTE — CONSULT NOTE ADULT - ASSESSMENT
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     # Diarrhea - Improved  - Suspect this is 2/2 starting Lanreotide recently  - Pt states that his diarrhea is markedly improved currently  - Continue supportive care as per primary team  - Monitor stool count    # RAZIA - Improving  - Appreciate Renal evaluation. RAZIA likely 2/2 ATN from hypotension and previous GI losses  - Cr steadily downtrending  - Monitor BMP    # Well-differentiated neuroendocrine tumor  - Dx 8/2021 (Ki index ~ 20%)  - PET/Dotatate scan (9/3/21): showed numerous DOTATATE-avid, non FDG-avid, bilobar hepatic metastases. It also showed DOTATATE-avid, non FDG-avid, lymph node adjacent to dorsal pancreatic body which was compatible with metastasis. A primary lesion was not identified.   - Pancreatic polypeptide was found to be elevated (> 2000). All other functional biochemicals are negative.   - Patient was started on Lanreotide and he received his first injection on 9/28.  - There are no plans for systemic therapy while inpatient  - Further management as noted above  - Primary Oncologist: Dr. Stephon Webster. Continue outpatient follow-up    Clayton Slaughter, PGY6  Hematology-Oncology Fellow  Pager: [phone] / [phone]

## 2021-10-12 NOTE — PROGRESS NOTE ADULT - PROBLEM SELECTOR PLAN 6
dvt ppx: hsq
unresolved fungal rash in buttock crease despite home nystatin cream and ?po fluconazole  -c/w clotrimazole and betamethasone cream bid on discharge  -wound care outpatient f/u Dr Ferrer
dvt ppx: hsq
unresolved fungal rash in buttock crease despite home nystatin cream and ?po fluconazole  -start clotrimazole and betametasone cream bid  -wound care eval

## 2021-10-12 NOTE — PROGRESS NOTE ADULT - PROBLEM SELECTOR PROBLEM 1
RAZIA (acute kidney injury)

## 2021-10-12 NOTE — PROGRESS NOTE ADULT - ATTENDING COMMENTS
Pt care and plan discussed and reviewed with NP. Plan as outlined above edited by me to reflect our discussion.
Pt care and plan discussed and reviewed with NP. Plan as outlined above edited by me to reflect our discussion.
[facility]  nephrology attending   Cell# [phone]   [facility] [phone]
Pt care and plan discussed and reviewed with NP. Plan as outlined above edited by me to reflect our discussion.
acute kidney injury - likely pre-renal azotemia in the setting of diarrhea+ Lasix use and Losartan. BP and kidney function is improving. continue fluids as above     [facility]  nephrology attending   Cell# [phone]   Office- [phone]
[facility]  nephrology attending   Cell# [phone]   [facility] [phone]
[facility]  nephrology attending   Cell# [phone]   [facility] [phone]

## 2021-10-12 NOTE — CONSULT NOTE ADULT - ASSESSMENT
A/P:  77 yo M PMHx HTN, HLD, DIOGO, ETOH use disorder, previous spinal surgeries, recent dx of liver neuroendocrine tumor started lanreotide last week p/w weakness, dizziness, and SOB adm with RAZIA/ATN and acute metabolic acidosis.    Wound Consult requested to assist w/ management of Fungal moisture dermatitis  Prophylaxis measure    Buttocks- continue clotrimazole/betamethasone can add IntraDry Ag   Moisturize intact skin w/ SWEEN cream BID  Nutritional optimization - consider MVI & Vit C to promote wound healing        encourage high quality protein  Continue turning and positioning w/ offloading assistive devices as per protocol  Continue w/ Pericare as per protocol  Waffle Cushion to chair when oob to chair  Continue w/ low air loss bed surface   Care as per medicine, will follow w/ you  Upon discharge f/u as outpatient at [facility] [address] [place], [place] [phone]  Seen w/ attng and D/w team  Thank you for this consult  Rhina Young PA-C [id number]  I spent 50minutes face to face w/ this pt of which more than 50% of the time was spent counseling & coordinating care of this pt.

## 2021-10-12 NOTE — PROGRESS NOTE ADULT - PROBLEM SELECTOR PLAN 8
resolved due to above
d/t diuretic use & diarrhea causing hypotension  - hold torsemide  - ivf as above  - monitor bp

## 2021-10-12 NOTE — DISCHARGE NOTE PROVIDER - CARE PROVIDERS DIRECT ADDRESSES
[email or web address]

## 2021-10-12 NOTE — CONSULT NOTE ADULT - ATTENDING COMMENTS
acute kidney injury - likely pre-renal azotemia in the setting of diarrhea+ Lasix use and Losartan. He is hypotensive as well. agree with volume expansion- can give LR bolus followed by sodium bicarbonate drip at 100cc/hr. strict I/Os. no dialysis need.     [facility]  nephrology attending   Cell# [phone]   Office- [phone]
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 Advised to make appointment with Dr Webster's office tomorrow AM and he will follow-up as an out-patient for further care.   Herson Low MD PhD  Oncology Attending

## 2021-10-12 NOTE — DISCHARGE NOTE PROVIDER - NSDCFUSCHEDAPPT_GEN_ALL_CORE_FT
NANDO HERNANDEZ ; 10/13/2021 ; [facility] [address] ; 10/20/2021 ; [facility] [address] ; 10/20/2021 ; [facility] [address] ; 10/26/2021 ; [facility]  NANDO HERNANDEZ ; 11/23/2021 ; [facility]  NANDO HERNANDEZ ; 12/21/2021 ; [facility] NANDO HERNANDEZ ; 10/14/2021 ; [address] [address]  NANDO HERNANDEZ ; 10/20/2021 ; [facility] [address] ; 10/20/2021 ; [facility] [address] ; 10/26/2021 ; [facility]  NANDO HERNANDEZ ; 11/23/2021 ; [facility]  NANDO HERNANDEZ ; 12/21/2021 ; [facility]

## 2021-10-12 NOTE — PROGRESS NOTE ADULT - PROVIDER SPECIALTY LIST ADULT
Cardiology
Nephrology
Cardiology
Hospitalist

## 2021-10-12 NOTE — DISCHARGE NOTE NURSING/CASE MANAGEMENT/SOCIAL WORK - PATIENT PORTAL LINK FT
You can access the FollowMyHealth Patient Portal offered by [facility] by registering at the following website: [email or web address]. By joining [employer]’s FollowMyHealth portal, you will also be able to view your health information using other applications (apps) compatible with our system.

## 2021-10-12 NOTE — PROGRESS NOTE ADULT - SUBJECTIVE AND OBJECTIVE BOX
Patient is a 76y old  Male who presents with a chief complaint of RAZIA (12 Oct 2021 13:23)      SUBJECTIVE / OVERNIGHT EVENTS: No ON events. Feels well, wants to go home. No further diarrhea. Had well formed BM this morning. Denies cp, sob, dizziness        ADDITIONAL REVIEW OF SYSTEMS: Negative except for above    MEDICATIONS  (STANDING):  allopurinol 200 milliGRAM(s) Oral two times a day  atorvastatin 80 milliGRAM(s) Oral at bedtime  clotrimazole/betamethasone Cream 1 Application(s) Topical two times a day  heparin   Injectable 5000 Unit(s) SubCutaneous every 8 hours  metoprolol succinate ER 25 milliGRAM(s) Oral daily    MEDICATIONS  (PRN):  acetaminophen   Tablet .. 650 milliGRAM(s) Oral every 6 hours PRN Temp greater or equal to 38C (100.4F), Mild Pain (1 - 3)  aluminum hydroxide/magnesium hydroxide/simethicone Suspension 30 milliLiter(s) Oral every 4 hours PRN Dyspepsia  LORazepam   Injectable 1 milliGRAM(s) IV Push every 2 hours PRN CIWA-Ar score increase by 2 points and a total score of 7 or less  melatonin 3 milliGRAM(s) Oral at bedtime PRN Insomnia  ondansetron Injectable 4 milliGRAM(s) IV Push every 8 hours PRN Nausea and/or Vomiting      CAPILLARY BLOOD GLUCOSE        I&O's Summary    11 Oct 2021 07:01  -  12 Oct 2021 07:00  --------------------------------------------------------  IN: 1110 mL / OUT: 500 mL / NET: 610 mL    12 Oct 2021 07:01  -  12 Oct 2021 17:12  --------------------------------------------------------  IN: 180 mL / OUT: 0 mL / NET: 180 mL        PHYSICAL EXAM:  Vital Signs Last 24 Hrs  T(C): 36.9 (12 Oct 2021 16:21), Max: 36.9 (11 Oct 2021 23:52)  T(F): 98.4 (12 Oct 2021 16:21), Max: 98.4 (11 Oct 2021 23:52)  HR: 87 (12 Oct 2021 16:21) (72 - 87)  BP: 125/72 (12 Oct 2021 16:21) (112/70 - 126/74)  BP(mean): --  RR: 18 (12 Oct 2021 16:21) (16 - 18)  SpO2: 97% (12 Oct 2021 16:21) (95% - 98%)      PHYSICAL EXAM:  GENERAL: NAD, well-developed   HEAD:  Atraumatic, Normocephalic  NECK: Supple, No JVD  CHEST/LUNG: Clear to auscultation bilaterally; No wheeze  HEART: Regular rate and rhythm;   ABDOMEN: Soft, Nontender, Nondistended; Bowel sounds present  EXTREMITIES:  2+ Peripheral Pulses, trace Le edema b/l   PSYCH: AAOx3  NEUROLOGY: non-focal  SKin: fungal rash in buttock crease improving    LABS:    10-12    140  |  104  |  50<H>  ----------------------------<  117<H>  4.4   |  23  |  1.65<H>    Ca    9.5      12 Oct 2021 06:36                  RADIOLOGY & ADDITIONAL TESTS:    Imaging Personally Reviewed:    Electrocardiogram Personally Reviewed:    COORDINATION OF CARE:  Care Discussed with Consultants/Other Providers [Y/N]:  Prior or Outpatient Records Reviewed [Y/N]:

## 2021-10-12 NOTE — PHYSICAL THERAPY INITIAL EVALUATION ADULT - PERTINENT HX OF CURRENT PROBLEM, REHAB EVAL
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

## 2021-10-12 NOTE — CONSULT NOTE ADULT - SUBJECTIVE AND OBJECTIVE BOX
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 Pt had b/l venous DVT US and b/l arterial duplex of the lower extremities noting distal L PT occlusion for which vascular surgery was consulted. Pt reports 1 year of b/l ankle pain associated with edema, improved with leg elevation and improved since he began taking diuretics. Also reports intermittent b/l calf pain, not associated with ambulation/exertion, worse when leg swelling is worse. Reports hx of gout affecting the right ankle as well, on allopurinol. Denies any foot pain, weakness, or numbness. Denies any acute worsening of pain, but reports ankle swelling has been worse this hospital stay since being taken off of his home diuretics.       PMHx: Hypertension    Hypercholesterolemia    PAD (peripheral artery disease)    Neuroendocrine carcinoma      PSHx: S/P knee replacement, bilateral    S/P hip replacement    History of hernia repair    H/O laminectomy    History of lumbosacral spine surgery      Medications (inpatient): allopurinol 200 milliGRAM(s) Oral two times a day  atorvastatin 80 milliGRAM(s) Oral at bedtime  clotrimazole/betamethasone Cream 1 Application(s) Topical two times a day  heparin   Injectable 5000 Unit(s) SubCutaneous every 8 hours  metoprolol succinate ER 25 milliGRAM(s) Oral daily    Medications (PRN):acetaminophen   Tablet .. 650 milliGRAM(s) Oral every 6 hours PRN  aluminum hydroxide/magnesium hydroxide/simethicone Suspension 30 milliLiter(s) Oral every 4 hours PRN  LORazepam   Injectable 1 milliGRAM(s) IV Push every 2 hours PRN  melatonin 3 milliGRAM(s) Oral at bedtime PRN  ondansetron Injectable 4 milliGRAM(s) IV Push every 8 hours PRN    Allergies: No Known Allergies  (Intolerances: )  Social Hx:   Family Hx: No pertinent family history in first degree relatives        T(C): 36.9  HR: 87 (72 - 87)  BP: 125/72 (112/70 - 126/74)  RR: 18 (16 - 18)  SpO2: 97% (95% - 98%)  Tmax: T(C): , Max: 36.9 (10-11-21 @ 23:52)    10-11-21  -  10-12-21  --------------------------------------------------------  IN:    Oral Fluid: 1110 mL  Total IN: 1110 mL    OUT:    Voided (mL): 500 mL  Total OUT: 500 mL    Total NET: 610 mL      10-12-21  -  10-12-21  --------------------------------------------------------  IN:    Oral Fluid: 180 mL  Total IN: 180 mL    OUT:  Total OUT: 0 mL    Total NET: 180 mL          Physical Exam:  General: Well-developed, in no acute distress   Neurologic: Awake, alert, GCS 15, No focal Deficits   Respiratory: Normal respiratory effort  CVS: RRR, perfusing adequately  Abdomen: Abdomen soft, NT/ND, no rebound or guarding   Ext: Grossly symmetric, Moving all extremities, motor and sensory function grossly intact, no wounds.  Vascular: 2+ b/l femoral and popliteal pulses, distal pulses nonpalpable with triphasic b/l DP/PT doppler signals. Both feet warm to touch.   Skin: Warm, dry, intact, no erythema     Labs:      10-12    140  |  104  |  50<H>  ----------------------------<  117<H>  4.4   |  23  |  1.65<H>    Ca    9.5      12 Oct 2021 06:36              Imaging and other studies:  < from: VA Duplex Lower Extrem Arterial, Bilat (10.12.21 @ 10:58) >    EXAM:  ART DUPLEX LOWER EXT BILATERAL                            PROCEDURE DATE:  10/12/2021            INTERPRETATION:  Clinical information: Lower extremity pain and tightness    Technique: Grayscale, color and spectral Doppler imaging was performed at the arteries of bilateral lower extremities.    Findings:    The right ankle-brachial index equals 1.06; the left ankle-brachial index equals 1.06    Mild atheromatous intimal thickening and irregularity is present along the course of both the arteries supplying the right and left lower extremities.    The peak systolic velocities of the Right lower extremity are as follows:  Distal external iliac artery: 109 cm/s  Common femoral artery: 93 cm/s  Deep femoral artery: 49 cm/s  Superficial femoral artery: 95 cm/s proximal,  92 cm/s mid, 90 cm/s distal  Popliteal artery: 65 cm/s  Tibioperoneal trunk: 79 cm/s  Posterior tibial artery: 73 cm/s proximal,  83 cm/s mid, 69 cm/s distal  Peroneal artery: 74 cm/s proximal,  89 cm/s mid, 92 cm/s distal  Anterior tibial artery: 58 cm/s proximal,  69 cm/s mid, 33 cm/s distal  Dorsalis pedis artery: 67 cm/s    The peak systolic velocities of the Left lower extremity are as follows:  Distal external iliac artery: 78 cm/s  Common femoral artery: 78 cm/s  Deep femoral artery: 49 cm/s  Superficial femoral artery: 95 cm/s proximal,  81 cm/s mid, 85 cm/s distal  Popliteal artery: 59 cm/s  Tibioperoneal trunk: 55 cm/s  Posterior tibial artery: 68 cm/s proximal,  35 cm/s mid, OCCLUDED distally.  Peroneal artery: 90 cm/s proximal,  97 cm/s mid, 102 cm/s distal  Anterior tibial artery: 85 cm/s proximal,  98 cm/s mid, 52 cm/s distal  Dorsalis pedis artery: 56 cm/s      Impression:    Unremarkable right lower extremity.    Occlusion of the distal leftposterior tibial artery.    --- End of Report ---              ROBY NICOLAS MD; Resident Interventional Radiology  This document has been electronically signed.  CLAYTON SILVERIO MD; Attending Radiologist  This document has been electronically signed. Oct 12 2021  2:11PM    < end of copied text >      < from: US Duplex Venous Lower Ext Complete, Bilateral (07.02.18 @ 13:36) >    EXAM:  US DPLX LWR EXT VEINS COMPL BI                          PROCEDURE DATE:  07/02/2018          INTERPRETATION:  VENOUS DUPLEX DOPPLER OF BOTH LOWER EXTREMITIES dated   7/2/2018 1:36 PM    INDICATION: Prolonged bedrest status post surgery. Concern for deep   venous thrombosis.    TECHNIQUE: Duplex Doppler evaluation including gray-scale ultrasound   imaging, color flow Doppler imaging, and Doppler spectral analysis of the   veins of both lower extremities was performed.     COMPARISON: None    FINDINGS:    Thigh veins: The common femoral, femoral, popliteal, proximal greater   saphenous, and proximal deep femoral veins are patent and free of   thrombus bilaterally. The veins are normally compressible and have normal   phasic flow and augmentation response.    Calf veins: The paired peroneal and posterior tibial calf veins are   patent bilaterally.    IMPRESSION:  No deep vein thrombosis seen.              "Thank you for the opportunity to participate in the care of this   patient."    BARRIE WEEKS M.D., RADIOLOGY RESIDENT  This document has been electronically signed.  JENNY JONAS M.D., ATTENDING RADIOLOGIST  This document has been electronically signed. Jul 2 2018  4:32PM                  < end of copied text >

## 2021-10-12 NOTE — DISCHARGE NOTE PROVIDER - PROVIDER TOKENS
PROVIDER:[TOKEN:[[id number]],FOLLOWUP:[1 week],ESTABLISHEDPATIENT:[T]],PROVIDER:[TOKEN:[[id number]],FOLLOWUP:[1 week],ESTABLISHEDPATIENT:[T]],PROVIDER:[TOKEN:[[id number]],FOLLOWUP:[1 week],ESTABLISHEDPATIENT:[T]] PROVIDER:[TOKEN:[[id number]],FOLLOWUP:[1 week],ESTABLISHEDPATIENT:[T]],PROVIDER:[TOKEN:[[id number]],FOLLOWUP:[1 week],ESTABLISHEDPATIENT:[T]],PROVIDER:[TOKEN:[[id number]],FOLLOWUP:[1 week],ESTABLISHEDPATIENT:[T]],PROVIDER:[TOKEN:[[id number]],FOLLOWUP:[1 week]]

## 2021-10-12 NOTE — DISCHARGE NOTE PROVIDER - NSDCCPCAREPLAN_GEN_ALL_CORE_FT
PRINCIPAL DISCHARGE DIAGNOSIS  Diagnosis: RAZIA (acute kidney injury)  Assessment and Plan of Treatment: Creat improved Follow up with  office in 5 days to repeat BMP      SECONDARY DISCHARGE DIAGNOSES  Diagnosis: Rash  Assessment and Plan of Treatment: Cont cream as ordered and Follow up with Wound care center    Diagnosis: Metabolic acidosis  Assessment and Plan of Treatment: Likely related to Diarrhea resovled now    Diagnosis: Elevated troponin  Assessment and Plan of Treatment: ekg without Ischemic changes     PRINCIPAL DISCHARGE DIAGNOSIS  Diagnosis: RAZIA (acute kidney injury)  Assessment and Plan of Treatment: Creat improved Follow up with  office in 5 days to repeat BMP      SECONDARY DISCHARGE DIAGNOSES  Diagnosis: Elevated troponin  Assessment and Plan of Treatment: ekg without Ischemic changes    Diagnosis: Metabolic acidosis  Assessment and Plan of Treatment: Likely related to Diarrhea resovled now    Diagnosis: Rash  Assessment and Plan of Treatment: Cont cream as ordered and Follow up with Wound care center    Diagnosis: Arterial occlusion  Assessment and Plan of Treatment: you had a blockage of a disatl artery in your leg  you need outpatient Vascular followup with DR Champagne would like you to get CARLOS/PVR + Venous Reflex Study done outpatient within 1 week , then follow up with Dr. Urban

## 2021-10-12 NOTE — DISCHARGE NOTE PROVIDER - CARE PROVIDER_API CALL
Agustin Hameed)  Cardiology; Internal Medicine  [address] [facility] [address]  [place] [address]  Phone: [phone]  Fax: [phone]  Established Patient  Follow Up Time: 1 week    Chantelle Ferrer)  Surgery; Vascular Surgery  [address] [facility], [address]  [place] [address]  Phone: [phone]  Fax: [phone]  Established Patient  Follow Up Time: 1 week    Stephon Webster)  Hematology; Internal Medicine; Medical Oncology  [address] [place] [address]  Phone: [phone]  Fax: [phone]  Established Patient  Follow Up Time: 1 week   Agustin Hameed)  Cardiology; Internal Medicine  [address] [facility] [address]  [place] [address]  Phone: [phone]  Fax: [phone]  Established Patient  Follow Up Time: 1 week    Chantelle Ferrer)  Surgery; Vascular Surgery  [address] [facility], [address]  [place] [address]  Phone: [phone]  Fax: [phone]  Established Patient  Follow Up Time: 1 week    Stephon Webster)  Hematology; Internal Medicine; Medical Oncology  [address] [place] [address]  Phone: [phone]  Fax: [phone]  Established Patient  Follow Up Time: 1 week    Silvia Urban)  Surgery  [address] [facility], [address]  [place] [address]  Phone: [phone]  Fax: [phone]  Follow Up Time: 1 week

## 2021-10-12 NOTE — PROGRESS NOTE ADULT - PROBLEM SELECTOR PLAN 4
-CT w/ hepatic mets  - hold lanreotide per Dr Webster, outpatient heme
follows heme onc Dr Webster  CT w/ hepatic mets  - hold lanreotide, will eventually need f/u with onc re: other treatment options, possibly outpatient
-CT w/ hepatic mets  - hold lanreotide per Dr Webster, outpatient heme
follows heme onc Dr Webster  CT w/ hepatic mets  - hold lanreotide, will eventually need f/u with onc re: other treatment options, possibly outpatient

## 2021-10-12 NOTE — PROGRESS NOTE ADULT - PROBLEM SELECTOR PROBLEM 5
Hypotension
Chronic pain of lower extremity, bilateral
Hypotension
Chronic pain of lower extremity, bilateral

## 2021-10-12 NOTE — CONSULT NOTE ADULT - ASSESSMENT
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     PLAN:  No signs of limb ischemia, no wounds. Pt not amenable to staying in the hospital for further studies or even for examination by vascular fellow/attending. Insists on dc with outpatient followup  Recommend outpatient followup with Dr. Urban for further evaluation and diagnostic imaging studies. Please call [phone] to schedule appointment  Discussed with vascular fellow and attending    Mehran Jameson, PGY 4  Vascular Surgery [phone]

## 2021-10-12 NOTE — PROGRESS NOTE ADULT - PROBLEM SELECTOR PLAN 2
2/2 renal failure  improving  C/w current bicarb drip per renal
2/2 renal failure  improving  C/w current bicarb drip; nephro in agreement
2/2 renal failure  improving  C/w current bicarb drip; nephro in agreement
resolved  d/c bicarb ggt

## 2021-10-12 NOTE — DISCHARGE NOTE PROVIDER - NSFOLLOWUPCLINICS_GEN_ALL_ED_FT
[facility]  Wound Care  [address] [place] [address]  Phone: [phone]  Fax: [phone]  Follow Up Time: 2 weeks

## 2021-10-12 NOTE — PROGRESS NOTE ADULT - SUBJECTIVE AND OBJECTIVE BOX
[facility] Division of Kidney Diseases & Hypertension  FOLLOW UP NOTE  [phone]--------------------------------------------------------------------------------  Chief Complaint:Acute renal failure        24 hour events/subjective: Patient seen & examined. Labs & vitals reviewed. Feels okay & wants to go home. UO in the last 24 hours 500cc (not accurate as some not saved)        PAST HISTORY  --------------------------------------------------------------------------------  No significant changes to PMH, PSH, FHx, SHx, unless otherwise noted    ALLERGIES & MEDICATIONS  --------------------------------------------------------------------------------  Allergies    No Known Allergies    Intolerances      Standing Inpatient Medications  allopurinol 200 milliGRAM(s) Oral two times a day  atorvastatin 80 milliGRAM(s) Oral at bedtime  clotrimazole/betamethasone Cream 1 Application(s) Topical two times a day  heparin   Injectable 5000 Unit(s) SubCutaneous every 8 hours  metoprolol succinate ER 25 milliGRAM(s) Oral daily    PRN Inpatient Medications  acetaminophen   Tablet .. 650 milliGRAM(s) Oral every 6 hours PRN  aluminum hydroxide/magnesium hydroxide/simethicone Suspension 30 milliLiter(s) Oral every 4 hours PRN  LORazepam   Injectable 1 milliGRAM(s) IV Push every 2 hours PRN  melatonin 3 milliGRAM(s) Oral at bedtime PRN  ondansetron Injectable 4 milliGRAM(s) IV Push every 8 hours PRN      REVIEW OF SYSTEMS  --------------------------------------------------------------------------------  Gen: No  fevers/chills  Respiratory: No dyspnea, cough  CV: No chest pain  GI: No abdominal pain, diarrhea,  nausea, vomiting  : No increased frequency, dysuria, hematuria  MSK:  no edema  Neuro: No dizziness/lightheadedness      All other systems were reviewed and are negative, except as noted.    VITALS/PHYSICAL EXAM  --------------------------------------------------------------------------------  T(C): 36.9 (10-11-21 @ 23:52), Max: 36.9 (10-11-21 @ 23:52)  HR: 72 (10-11-21 @ 23:52) (72 - 74)  BP: 124/74 (10-11-21 @ 23:52) (112/70 - 124/74)  RR: 16 (10-11-21 @ 23:52) (16 - 16)  SpO2: 98% (10-11-21 @ 23:52) (96% - 98%)  Wt(kg): --        10-11-21 @ 07:01  -  10-12-21 @ 07:00  --------------------------------------------------------  IN: 1110 mL / OUT: 500 mL / NET: 610 mL      Physical Exam:  	Gen: NAD  	HEENT: MMM  	Pulm: CTA B/L  	CV: S1S2  	Abd: Soft, +BS   	Ext: + LE edema B/L  	Neuro: Awake  	Skin: Warm and dry  	Vascular access: N/A      LABS/STUDIES  --------------------------------------------------------------------------------    140  |  104  |  50  ----------------------------<  117      [10-12-21 @ 06:36]  4.4   |  23  |  1.65        Ca     9.5     [10-12-21 @ 06:36]          Uric acid 6.8      [10-12-21 @ 06:36]    Creatinine Trend:  SCr 1.65 [10-12 @ 06:36]  SCr 2.00 [10-11 @ 07:42]  SCr 2.45 [10-10 @ 06:55]  SCr 4.33 [10-09 @ 06:59]  SCr 5.52 [10-08 @ 21:05]    Urinalysis - [10-08-21 @ 20:19]      Color Colorless / Appearance Clear / SG 1.009 / pH 5.0      Gluc Negative / Ketone Negative  / Bili Negative / Urobili Negative       Blood Trace / Protein Negative / Leuk Est Negative / Nitrite Negative      RBC 1 / WBC 1 / Hyaline 2 / Gran  / Sq Epi  / Non Sq Epi 0 / Bacteria 0.0    Urine Creatinine 63      [10-08-21 @ 20:19]  Urine Urea Nitrogen 458      [10-09-21 @ 01:02]  Urine Osmolality 376      [10-08-21 @ 20:19]      HCV 0.11, Nonreact      [10-09-21 @ 09:59]

## 2021-10-12 NOTE — PROGRESS NOTE ADULT - SUBJECTIVE AND OBJECTIVE BOX
Date of Service   10-12-21 @ 13:24    Patient is a 76y old  Male who presents with a chief complaint of RAZIA (12 Oct 2021 10:28)      INTERVAL HISTORY: pt feels ok     REVIEW OF SYSTEMS:   CONSTITUTIONAL: No weakness  EYES/ENT: No visual changes; No throat pain  Neck: No pain or stiffness  Respiratory: No cough, wheezing, No shortness of breath  CARDIOVASCULAR: no chest pain or palpitations  GASTROINTESTINAL: No abdominal pain, no nausea, vomiting or hematemesis  GENITOURINARY: No dysuria, frequency or hematuria  NEUROLOGICAL: No stroke like symptoms  SKIN: No rashes    	  MEDICATIONS:  metoprolol succinate ER 25 milliGRAM(s) Oral daily        PHYSICAL EXAM:  T(C): 36.4 (10-12-21 @ 08:31), Max: 36.9 (10-11-21 @ 23:52)  HR: 85 (10-12-21 @ 13:00) (72 - 85)  BP: 126/74 (10-12-21 @ 13:00) (112/70 - 126/74)  RR: 18 (10-12-21 @ 13:00) (16 - 18)  SpO2: 95% (10-12-21 @ 13:00) (95% - 98%)  Wt(kg): --  I&O's Summary    11 Oct 2021 07:01  -  12 Oct 2021 07:00  --------------------------------------------------------  IN: 1110 mL / OUT: 500 mL / NET: 610 mL    12 Oct 2021 07:01  -  12 Oct 2021 13:24  --------------------------------------------------------  IN: 180 mL / OUT: 0 mL / NET: 180 mL          Appearance: In no distress	  HEENT:    PERRL, EOMI	  Cardiovascular:  S1 S2, No JVD  Respiratory: Lungs clear to auscultation	  Gastrointestinal:  Soft, Non-tender, + BS	  Vascularature:  No edema of LE  Psychiatric: Appropriate affect   Neuro: no acute focal deficits           10-12    140  |  104  |  50<H>  ----------------------------<  117<H>  4.4   |  23  |  1.65<H>    Ca    9.5      12 Oct 2021 06:36          Labs personally reviewed  < from: [facility] Duplex Lower Ext Vein Scan, Bilat (10.09.21 @ 11:09) >  IMPRESSION:  No evidence of deep venous thrombosis in either lower extremity.      < end of copied text >  < from: US Kidney and Bladder (10.09.21 @ 10:54) >    8 mm left intrarenal stone.  No evidence of hydronephrosis.    < end of copied text >      ASSESSMENT/PLAN: 	  75 yo M PMHx HTN, HLD, DIOGO, ETOH use disorder, previous spinal surgeries, recent dx of liver neuroendocrine tumor started lanreotide last week p/w weakness, dizziness, and SOB adm with RAZIA/ATN and acute metabolic acidosis.       Problem/Plan - 1:  ·  Problem: Elevated troponin.   - ekg without ischemic change.  - likely non-MI troponin elevation 2/2 RAZIA  trop peaked.     Problem/Plan - 2:  ·  Problem: Hypotension- resolved  ·  Plan: d/t diuretic use & diarrhea causing hypotension  - hold torsemide  - resume when creatinine drops under 2  - daily weights  -i's & o's  - ivf as above  - monitor bp.     Problem/Plan - 3:  ·  Problem: RAZIA (acute kidney injury).   ·  Plan: likely ATN 2/2 hypotension and pre-renal RAZIA in setting of volume depletion diarrhea d/t lanreotide side effect  - hold lanreotide  - C/w current bicarb drip; nephro in agreement.   - bladder scan for PVR and renal US pending.   - monitor uop and hemodynamics     Problem/Plan - 4:  ·  Problem: Prophylactic measure.   ·  Plan: dvt ppx: hsq.  -BLLE dopplers negative for DVT        Jorge PHAM-NAOMY Hayes DO [facility]  Cardiovascular Medicine  [address], [address]  Office: [phone]  Cell: [phone] Date of Service   10-12-21 @ 13:24    Patient is a 76y old  Male who presents with a chief complaint of RAZIA (12 Oct 2021 10:28)      INTERVAL HISTORY: pt feels ok     REVIEW OF SYSTEMS:   CONSTITUTIONAL: No weakness  EYES/ENT: No visual changes; No throat pain  Neck: No pain or stiffness  Respiratory: No cough, wheezing, No shortness of breath  CARDIOVASCULAR: no chest pain or palpitations  GASTROINTESTINAL: No abdominal pain, no nausea, vomiting or hematemesis  GENITOURINARY: No dysuria, frequency or hematuria  NEUROLOGICAL: No stroke like symptoms  SKIN: No rashes    	  MEDICATIONS:  metoprolol succinate ER 25 milliGRAM(s) Oral daily        PHYSICAL EXAM:  T(C): 36.4 (10-12-21 @ 08:31), Max: 36.9 (10-11-21 @ 23:52)  HR: 85 (10-12-21 @ 13:00) (72 - 85)  BP: 126/74 (10-12-21 @ 13:00) (112/70 - 126/74)  RR: 18 (10-12-21 @ 13:00) (16 - 18)  SpO2: 95% (10-12-21 @ 13:00) (95% - 98%)  Wt(kg): --  I&O's Summary    11 Oct 2021 07:01  -  12 Oct 2021 07:00  --------------------------------------------------------  IN: 1110 mL / OUT: 500 mL / NET: 610 mL    12 Oct 2021 07:01  -  12 Oct 2021 13:24  --------------------------------------------------------  IN: 180 mL / OUT: 0 mL / NET: 180 mL          Appearance: In no distress	  HEENT:    PERRL, EOMI	  Cardiovascular:  S1 S2, No JVD  Respiratory: Lungs clear to auscultation	  Gastrointestinal:  Soft, Non-tender, + BS	  Vascularature: + edema of LE  Psychiatric: Appropriate affect   Neuro: no acute focal deficits           10-12    140  |  104  |  50<H>  ----------------------------<  117<H>  4.4   |  23  |  1.65<H>    Ca    9.5      12 Oct 2021 06:36          Labs personally reviewed  < from: [facility] Duplex Lower Ext Vein Scan, Bilat (10.09.21 @ 11:09) >  IMPRESSION:  No evidence of deep venous thrombosis in either lower extremity.      < end of copied text >  < from: US Kidney and Bladder (10.09.21 @ 10:54) >    8 mm left intrarenal stone.  No evidence of hydronephrosis.    < end of copied text >      ASSESSMENT/PLAN: 	  75 yo M PMHx HTN, HLD, DIOGO, ETOH use disorder, previous spinal surgeries, recent dx of liver neuroendocrine tumor started lanreotide last week p/w weakness, dizziness, and SOB adm with RAZIA/ATN and acute metabolic acidosis.       Problem/Plan - 1:  ·  Problem: Elevated troponin.   - ekg without ischemic change.  - likely non-MI troponin elevation 2/2 RAZIA  trop peaked.     Problem/Plan - 2:  ·  Problem: Hypotension- resolved  ·  Plan: d/t diuretic use & diarrhea causing hypotension  - hold torsemide  - resume when creatinine drops under 2  - daily weights  -i's & o's  - ivf as above  - monitor bp.     Problem/Plan - 3:  ·  Problem: RAZIA (acute kidney injury).   ·  Plan: likely ATN 2/2 hypotension and pre-renal RAZIA in setting of volume depletion diarrhea d/t lanreotide side effect  - hold lanreotide  - C/w current bicarb drip; nephro in agreement.   - bladder scan for PVR and renal US pending.   - monitor uop and hemodynamics     Problem/Plan - 4:  ·  Problem: Prophylactic measure.   ·  Plan: dvt ppx: hsq.  -BLLE dopplers negative for DVT        Jorge PHAM-NAOMY Hayes DO [facility]  Cardiovascular Medicine  [address], [address]  Office: [phone]  Cell: [phone]

## 2021-10-12 NOTE — GOALS OF CARE CONVERSATION - ADVANCED CARE PLANNING - CONVERSATION DETAILS
ICU PROGRESS NOTE              Subjective  Remains on supplemental O2.  Desaturates with activity per nursing staff.:      ALLERGIES:  Patient has no known allergies.    Medications    Current Facility-Administered Medications   Medication Dose Route Frequency Provider Last Rate Last Admin   • famotidine (PEPCID) tablet 20 mg  20 mg Oral 2 times per day Willis Guerra MD       • cefdinir (OMNICEF) capsule 300 mg  300 mg Oral 2 times per day Mary Jiang       • azithromycin (ZITHROMAX) tablet 500 mg  500 mg Oral Daily Mary Jiang       • acetaminophen (TYLENOL) tablet 650 mg  650 mg Oral Q4H PRN Monae Park MD   650 mg at 12/16/20 0251   • enoxaparin (LOVENOX) injection 90 mg  1 mg/kg Subcutaneous Q12H Willis Guerra MD   90 mg at 12/20/20 0828   • sodium chloride 0.9 % flush bag 25 mL  25 mL Intravenous PRN Ky Stephen, DO       • sodium chloride (PF) 0.9 % injection 2 mL  2 mL Intracatheter 2 times per day Ky Stephen, DO   2 mL at 12/20/20 0828   • albuterol inhaler 2 puff  2 puff Inhalation Q4H Resp PRN Kydea Valerioila, DO       • guaiFENesin-DM) (ROBITUSSIN DM) 100-10 MG/5ML syrup 10 mL  10 mL Oral Q4H PRN Ky Valerioila, DO   10 mL at 12/17/20 2219             Last Recorded Vitals  Vitals with min/max:      Vital Last Value 24 Hour Range   Temperature 97.7 °F (36.5 °C) (12/20/20 1146) Temp  Min: 97.5 °F (36.4 °C)  Max: 98.2 °F (36.8 °C)   Pulse (!) 120 (12/20/20 1123) Pulse  Min: 81  Max: 124   Respiratory (!) 29 (12/20/20 1123) Resp  Min: 18  Max: 35   Non-Invasive  Blood Pressure 135/72 (12/20/20 1122) BP  Min: 90/75  Max: 135/72   Pulse Oximetry (!) 81 % (12/20/20 1123) SpO2  Min: 81 %  Max: 97 %   Arterial   Blood Pressure   No data recorded      I/O last 3 completed shifts:  In: 850 [P.O.:600; IV Piggyback:250]  Out: 2850 [Urine:2850]  No intake/output data recorded.          Physical Exam   physical examination deferred due to Covid action plan.  Bilateral  air entry per nursing staff.   Labs   CBC  Recent Labs   Lab 12/20/20  0657 12/19/20  0633 12/18/20  0605 12/16/20  0613 12/15/20  1913  11/30/20  0509   WBC 11.8* 12.3* 12.9* 9.2 16.4*   < > 15.6*   RBC 4.28* 4.12* 3.64* 3.49* 4.17*   < > 4.82   HGB 12.0* 11.2* 10.2* 9.8* 11.5*   < > 13.3   HCT 37.0* 36.0* 31.8* 30.5* 36.5*   < > 41.6   MCV 86.4 87.4 87.4 87.4 87.5   < > 86.3    285 239 177 199   < > 200   LYMPH  --   --   --   --   --   --  14   Lymphocytes, Percent  --   --   --  7 10  --   --     < > = values in this interval not displayed.     CMP  Recent Labs   Lab 12/20/20  0650 12/19/20  1112 12/19/20  0633 12/18/20  0605   SODIUM 137  --  139 139   POTASSIUM 4.6 3.6 5.6* 4.3   CHLORIDE 102  --  104 105   CO2 25  --  30 25   BUN 26*  --  26* 26*   CREATININE 1.20*  --  1.01 0.96   GLUCOSE 87  --  101* 115*   CALCIUM 8.0*  --  9.0 8.7     Recent Labs   Lab 12/16/20  0614 12/15/20  1913   AST 34 44*  43*   GPT 39 45  45   ALKPT 115 144*  141*   BILIRUBIN 0.4 0.9  0.9   ALBUMIN 1.9* 2.4*  2.3*     Recent Labs   Lab 12/15/20  1913   PT 12.1*   PTT 40*   INR 1.2     UA  Lab Results   Component Value Date    UWBC Negative 12/15/2020    UWBC NEGATIVE 08/28/2019    URBC Negative 12/15/2020    URBC NEGATIVE 08/28/2019        Lab Results   Component Value Date    NTPROB 179 (H) 12/16/2020       Lab Results   Component Value Date    PCT 0.28 (H) 12/18/2020        No results found    Lab Results   Component Value Date    DDIMER 0.92 (H) 12/18/2020    DDIMER 1.76 (H) 12/15/2020    DDIMER 1.36 (H) 12/02/2020       Lab Results   Component Value Date    RAPH 7.41 12/16/2020    RAPCO2 36 12/16/2020    RAPO2 127 (H) 12/16/2020    RAHCO3 23 12/16/2020    RASAT 99 12/16/2020    RFIO2 60 12/16/2020       Microbiology  Microbiology Results     None             Imaging  CT chest reviewed showing no PE.  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  Continue antibiotics for total of 8 days.  Prior to discharge can be switched to p.o. like linezolid.  Patient came with fevers and leukocytosis and both of them have resolved with the current antibiotic regimen and no reason to suspect atypical pneumonia .    DVT Prophylaxis  Current Active Medications for DVT Prophylaxis (From admission, onward)         Stop     enoxaparin (LOVENOX) injection 90 mg  1 mg/kg,   Subcutaneous,   EVERY 12 HOURS      --                  Code Status    Code Status: Full Resuscitation      Ganesh Flores MD  12/20/2020 3:26 PM    Pt was seen for goals of care conversation. Pt was provided the video code NAOMIE and MOLST form for completion.  pt was very receptive and was happy that this service was provided.

## 2021-10-12 NOTE — DISCHARGE NOTE PROVIDER - NSDCFUADDAPPT_GEN_ALL_CORE_FT
get bloodwork to check your kidney function (BMP) within 3-5 days with your primary care doctor or oncologist  get blood work to check your kidney function (BMP) within 3-5 days with your primary care doctor or oncologist     Vascular MD would like you to get CARLOS/PVR + Venous Reflex Study done outpatient within 1 week , then follow up with Dr. Urban     Follow up with wound care center in 2weeks  get blood work to check your kidney function (BMP) within 3-5 days with your primary care doctor or oncologist     Vascular MD DR Cahmpagne would like you to get CARLOS/PVR + Venous Reflex Study done outpatient within 1 week , then follow up with Dr. Urban     Follow up with wound care center in 2weeks

## 2021-10-12 NOTE — DISCHARGE NOTE PROVIDER - HOSPITAL COURSE
75 yo M PMHx HTN, HLD, DIOGO, ETOH use disorder, previous spinal surgeries, recent dx of liver neuroendocrine tumor started lanreotide last week p/w weakness, dizziness, and SOB adm with RAZIA/ATN and acute metabolic acidosis.     Problem/Plan - 1:  ·  Problem: RAZIA (acute kidney injury).   ·  Plan: likely ATN 2/2 hypotension and pre-renal RAZIA in setting of volume depletion from diarrhea d/t lanreotide side effect  - hold lanreotide on discharge per heme onc  - cr 1.6 approaching baseline, much improved  - repeat bmp within 5 days as PCP or oncologist office  - per cards, restart home torsemide 20mg bid with potassium as cr <2 and has some LE edema  - Renal US:  8 mm left intrarenal stone.  No evidence of hydronephrosis.  - renal cleared patient for discharge.     Problem/Plan - 2:  ·  Problem: Metabolic acidosis.   ·  Plan: resolved  d/c bicarb ggt.     Problem/Plan - 3:  ·  Problem: Diarrhea.   ·  Plan: per Dr Webster,  diarrhea likely from lanreotide, hold on discharge  -no further diarrhea  -heme consult appreciated: d/c home today with outpatient f/u.     Problem/Plan - 4:  ·  Problem: Neuroendocrine carcinoma.   ·  Plan: -CT w/ hepatic mets  - hold lanreotide per Dr Webster, outpatient heme.     Problem/Plan - 5:  ·  Problem: Chronic pain of lower extremity, bilateral.   ·  Plan: nonspecific chronic pain in b/l LE: likely multifactorial  -not consistent with gout attack, c/w home allopurinol, uric acid wnl  -restart diuretic as above  -LE duplex negative for DVt  -arterial duplex with isolated Occlusion of the distal left posterior tibial artery  -no s/s of ischemia on physical exam  -discussed with DR Hameed who rec vascular consult who will see patient shortly.     Problem/Plan - 6:  ·  Problem: Rash.   ·  Plan: unresolved fungal rash in buttock crease despite home nystatin cream and ?po fluconazole  -c/w clotrimazole and betamethasone cream bid on discharge  -wound care outpatient f/u Dr Ferrer.     Problem/Plan - 7:  ·  Problem: Elevated troponin.   ·  Plan: ekg without ischemic change. likely non-MI troponin elevation 2/2 RAZIA  trop peaked.  no anginal equivalents on exam.  per cards stable for d/c.     Problem/Plan - 8:  ·  Problem: Hypotension.   ·  Plan: resolved due to above.     Problem/Plan - 9:  ·  Problem: Prophylactic measure.   ·  Plan: dvt ppx: hsq    discussed with ESTEFANI Boyce and DR Hameed    Dispo: d/c home today after vascular surgery eval, outpatient pcp, vascular, wound care, and heme onc followup, outpatient bmp within 5 days    spent 45 min on d/c time.      Electronic Signatures:   75 yo M PMHx HTN, HLD, DIOGO, ETOH use disorder, previous spinal surgeries, recent dx of liver neuroendocrine tumor started lanreotide last week p/w weakness, dizziness, and SOB adm with RAZIA/ATN and acute metabolic acidosis.     Problem/Plan - 1:  ·  Problem: RAZIA (acute kidney injury).   ·  Plan: likely ATN 2/2 hypotension and pre-renal RAZIA in setting of volume depletion from diarrhea d/t lanreotide side effect  - hold lanreotide on discharge per heme onc  - cr 1.6 approaching baseline, much improved  - repeat bmp within 5 days as PCP or oncologist office  - per cards, restart home torsemide 20mg bid with potassium as cr <2 and has some LE edema  - Renal US:  8 mm left intrarenal stone.  No evidence of hydronephrosis.  - renal cleared patient for discharge.     Problem/Plan - 2:  ·  Problem: Metabolic acidosis.   ·  Plan: resolved  d/c bicarb ggt.     Problem/Plan - 3:  ·  Problem: Diarrhea.   ·  Plan: per Dr Webster,  diarrhea likely from lanreotide, hold on discharge  -no further diarrhea  -heme consult appreciated: d/c home today with outpatient f/u.     Problem/Plan - 4:  ·  Problem: Neuroendocrine carcinoma.   ·  Plan: -CT w/ hepatic mets  - hold lanreotide per Dr Webster, outpatient heme.     Problem/Plan - 5:  ·  Problem: Chronic pain of lower extremity, bilateral.   ·  Plan: nonspecific chronic pain in b/l LE: likely multifactorial  -not consistent with gout attack, c/w home allopurinol, uric acid wnl  -restart diuretic as above  -LE duplex negative for DVt  -arterial duplex with isolated Occlusion of the distal left posterior tibial artery  -no s/s of ischemia on physical exam  -discussed with DR Hameed who rec vascular consult who will see patient shortly.  -vascular saw patient and rec outpatent f/u with Dr Champagne for CARLOS/PVR and venous reflex studies     Problem/Plan - 6:  ·  Problem: Rash.   ·  Plan: unresolved fungal rash in buttock crease despite home nystatin cream and ?po fluconazole  -c/w clotrimazole and betamethasone cream bid on discharge  -wound care outpatient f/u Dr Ferrer.     Problem/Plan - 7:  ·  Problem: Elevated troponin.   ·  Plan: ekg without ischemic change. likely non-MI troponin elevation 2/2 RAZIA  trop peaked.  no anginal equivalents on exam.  per cards stable for d/c.     Problem/Plan - 8:  ·  Problem: Hypotension.   ·  Plan: resolved due to above.     Problem/Plan - 9:  ·  Problem: Prophylactic measure.   ·  Plan: dvt ppx: hsq    discussed with ESTEFANI Boyce and DR Hameed    Dispo: d/c home today after vascular surgery eval, outpatient pcp, vascular, wound care, and heme onc followup, outpatient bmp within 5 days    spent 45 min on d/c time.

## 2021-10-12 NOTE — PROGRESS NOTE ADULT - PROBLEM SELECTOR PLAN 3
ekg without ischemic change. likely non-MI troponin elevation 2/2 RAZIA  trop peaked.  no anginal equivalents on exam.
ekg without ischemic change. likely non-MI troponin elevation 2/2 RAZIA  trop peaked.  no anginal equivalents on exam.
per Dr Webster,  diarrhea likely from lanreotide, hold on discharge  -no further diarrhea  -heme consult appreciated: d/c home today with outpatient f/u
per Dr Webster,  diarrhea likely from lanreotide, continue to hold  -only had 1 episode of diarrhea overnight  -per heme, switch to ocretotide if diarrhea continues  -heme consult

## 2021-10-12 NOTE — PROGRESS NOTE ADULT - ASSESSMENT
77 yo M PMHx HTN, HLD, DIOGO, previous spinal surgeries, recent dx of liver neuroendocrine tumor, started lanreotide last week, presents for worsening SOB. Normally states he can walk several blocks but now, SOB after 50 feet. Pt also has been having daily, non bloody watery diarrhea since receiving lanreotide 1 week ago. His leg swelling is the same. He denies abd pain, fevers, chills, dysuria, chest pain, dizziness. Nephrology called for RAZIA.             RAZIA on CKD-  Razia likely in the setting of ATN from hypotension & ongoing GI losses. Upon review of [facility] pt's baseline SCr is 1.2-1.3 in 2020 up until 8/30/21. On admission SCr is 6.97 with anion gap metabolic acidosis. SCr improved to 1.65 with IVF. D/c bicarb gtt as bicarb is 23.  No evidence of urinary retention; monitor UOP.   Renal US: Right kidney: 12.3 cm. No renal mass, hydronephrosis or calculi. Mid pole cyst measuring 2.0 cm.Left kidney: 10.8 cm. No renal mass, hydronephrosis. Mid pole nonobstructing stone measuring 8 mm in greatest dimension.Urinary bladder: Within normal limits.  Monitor labs and urine output. Avoid NSAIDs, ACEI/ARBS, RCA and nephrotoxins. Dose medications as per eGFR.      Primary Respiratory acidosis with secondary metabolic acidosis (AGMA + NAGMA)- improving  Lactate wnl  D/c bicarb gtt as bicarb is 23.    f/u BMP  Respiratory acidosis- per primary team                If you have any questions, please feel free to contact [employer]  Nephrology Fellow  Pager NS: [phone]/ [facility]: [phone]    (After 5 pm or on weekends please page the on-call fellow, can check [email or web address] for schedule. Login is salvatore ching, schedule under [facility] medicine, psych, derm)       75 yo M PMHx HTN, HLD, DIOGO, previous spinal surgeries, recent dx of liver neuroendocrine tumor, started lanreotide last week, presents for worsening SOB. Normally states he can walk several blocks but now, SOB after 50 feet. Pt also has been having daily, non bloody watery diarrhea since receiving lanreotide 1 week ago. His leg swelling is the same. He denies abd pain, fevers, chills, dysuria, chest pain, dizziness. Nephrology called for RAZIA.             RAZIA on CKD-  Razia likely in the setting of ATN from hypotension & ongoing GI losses. Upon review of [facility] pt's baseline SCr is 1.2-1.3 in 2020 up until 8/30/21. On admission SCr is 6.97 with anion gap metabolic acidosis. SCr improved to 1.65 with IVF. D/c bicarb gtt as bicarb is 23.  No evidence of urinary retention; monitor UOP.   Renal US: Right kidney: 12.3 cm. No renal mass, hydronephrosis or calculi. Mid pole cyst measuring 2.0 cm.Left kidney: 10.8 cm. No renal mass, hydronephrosis. Mid pole nonobstructing stone measuring 8 mm in greatest dimension.Urinary bladder: Within normal limits.  Monitor labs and urine output. Avoid NSAIDs, ACEI/ARBS, RCA and nephrotoxins. Dose medications as per eGFR.      Primary Respiratory acidosis with secondary metabolic acidosis (AGMA + NAGMA)- improving  Lactate wnl  D/c bicarb gtt as bicarb is 23.    f/u BMP  Respiratory acidosis- per primary team      Will sign off          If you have any questions, please feel free to contact me  [employer]  Nephrology Fellow  Pager NS: [phone]/ [facility]: [phone]    (After 5 pm or on weekends please page the on-call fellow, can check [email or web address] for schedule. Login is salvatore ching, schedule under [facility] medicine, psych, derm)

## 2021-10-12 NOTE — DISCHARGE NOTE NURSING/CASE MANAGEMENT/SOCIAL WORK - NSDCPEFALRISK_GEN_ALL_CORE
For information on Fall & injury Prevention, visit [email or web address]

## 2021-10-13 ENCOUNTER — APPOINTMENT (OUTPATIENT)
Dept: WOUND CARE | Facility: CLINIC | Age: 76
End: 2021-10-13

## 2021-10-13 ENCOUNTER — NON-APPOINTMENT (OUTPATIENT)
Age: 76
End: 2021-10-13

## 2021-10-13 PROBLEM — C7A.8 OTHER MALIGNANT NEUROENDOCRINE TUMORS: Chronic | Status: ACTIVE | Noted: 2021-10-08

## 2021-10-14 ENCOUNTER — NON-APPOINTMENT (OUTPATIENT)
Age: 76
End: 2021-10-14

## 2021-10-14 ENCOUNTER — APPOINTMENT (OUTPATIENT)
Dept: CARDIOLOGY | Facility: CLINIC | Age: 76
End: 2021-10-14
Payer: MEDICARE

## 2021-10-14 VITALS
HEART RATE: 90 BPM | DIASTOLIC BLOOD PRESSURE: 60 MMHG | HEIGHT: 74 IN | SYSTOLIC BLOOD PRESSURE: 118 MMHG | OXYGEN SATURATION: 96 % | TEMPERATURE: 98.3 F | WEIGHT: 300 LBS | BODY MASS INDEX: 38.5 KG/M2

## 2021-10-14 DIAGNOSIS — I77.1 STRICTURE OF ARTERY: ICD-10-CM

## 2021-10-14 DIAGNOSIS — N17.9 ACUTE KIDNEY FAILURE, UNSPECIFIED: ICD-10-CM

## 2021-10-14 PROCEDURE — 99214 OFFICE O/P EST MOD 30 MIN: CPT

## 2021-10-14 PROCEDURE — 93000 ELECTROCARDIOGRAM COMPLETE: CPT

## 2021-10-14 NOTE — HISTORY OF PRESENT ILLNESS
[FreeTextEntry1] : This is a 76 year old gentlemen who presents today for hospital follow up. Patient was recently diagnosed with Neuroendocrine cancer with Hepatic Mets and is being treated by Oncology (Dr. Webster). Patient was admitted to [facility] with Generalized Weakness, hypotension,  and Diarrhea. Patient was found to have elevated Troponin, however, according to cardiology team patient was OK to be discharged as they did not believe the elevated Troponin was cardiac induced. Patient was found with arterial occlusion, to see vascular outpatient. Patient with rash to the center of the buttock. He is using a cream prescribed by the hospital, that he states is helping with improvement. Patient also found to be in RAZIA. Patient reports residual SOB since hospital discharge. Patient denies dyspnea, palpitations, nausea, vomiting, dizziness and lightheadedness.\par

## 2021-10-14 NOTE — PHYSICAL EXAM
[Well Developed] : well developed [Well Nourished] : well nourished [No Acute Distress] : no acute distress [Normal Conjunctiva] : normal conjunctiva [Normal Venous Pressure] : normal venous pressure [No Carotid Bruit] : no carotid bruit [Normal S1, S2] : normal S1, S2 [No Murmur] : no murmur [No Rub] : no rub [No Gallop] : no gallop [Clear Lung Fields] : clear lung fields [Good Air Entry] : good air entry [No Respiratory Distress] : no respiratory distress  [Soft] : abdomen soft [Non Tender] : non-tender [No Masses/organomegaly] : no masses/organomegaly [Normal Bowel Sounds] : normal bowel sounds [No Cyanosis] : no cyanosis [No Clubbing] : no clubbing [No Varicosities] : no varicosities [No Rash] : no rash [No Skin Lesions] : no skin lesions [Moves all extremities] : moves all extremities [No Focal Deficits] : no focal deficits [Normal Speech] : normal speech [Alert and Oriented] : alert and oriented [Normal memory] : normal memory [de-identified] : Using cane  [de-identified] : Bilateral leg edema ankle pain right greater then left

## 2021-10-14 NOTE — REVIEW OF SYSTEMS
[SOB] : shortness of breath [Dyspnea on exertion] : dyspnea during exertion [Negative] : Heme/Lymph [Chest Discomfort] : no chest discomfort [Lower Ext Edema] : no extremity edema [Leg Claudication] : no intermittent leg claudication [Palpitations] : no palpitations [Orthopnea] : no orthopnea [PND] : no PND [Syncope] : no syncope [Joint Pain] : joint pain

## 2021-10-16 LAB
ALBUMIN SERPL ELPH-MCNC: 4.3 G/DL
ALP BLD-CCNC: 53 U/L
ALT SERPL-CCNC: 50 U/L
ANION GAP SERPL CALC-SCNC: 14 MMOL/L
AST SERPL-CCNC: 31 U/L
BASOPHILS # BLD AUTO: 0.04 K/UL
BASOPHILS NFR BLD AUTO: 0.4 %
BILIRUB SERPL-MCNC: 0.4 MG/DL
BUN SERPL-MCNC: 59 MG/DL
CALCIUM SERPL-MCNC: 9.8 MG/DL
CHLORIDE SERPL-SCNC: 101 MMOL/L
CHOLEST SERPL-MCNC: 121 MG/DL
CO2 SERPL-SCNC: 23 MMOL/L
CREAT SERPL-MCNC: 2.17 MG/DL
EOSINOPHIL # BLD AUTO: 0.29 K/UL
EOSINOPHIL NFR BLD AUTO: 3.3 %
ESTIMATED AVERAGE GLUCOSE: 140 MG/DL
GLUCOSE SERPL-MCNC: 120 MG/DL
HBA1C MFR BLD HPLC: 6.5 %
HCT VFR BLD CALC: 40.2 %
HDLC SERPL-MCNC: 42 MG/DL
HGB BLD-MCNC: 12.7 G/DL
IMM GRANULOCYTES NFR BLD AUTO: 0.6 %
LDLC SERPL CALC-MCNC: 63 MG/DL
LYMPHOCYTES # BLD AUTO: 1.67 K/UL
LYMPHOCYTES NFR BLD AUTO: 18.7 %
MAN DIFF?: NORMAL
MCHC RBC-ENTMCNC: 31.6 GM/DL
MCHC RBC-ENTMCNC: 31.8 PG
MCV RBC AUTO: 100.8 FL
MONOCYTES # BLD AUTO: 0.91 K/UL
MONOCYTES NFR BLD AUTO: 10.2 %
NEUTROPHILS # BLD AUTO: 5.95 K/UL
NEUTROPHILS NFR BLD AUTO: 66.8 %
NONHDLC SERPL-MCNC: 80 MG/DL
NT-PROBNP SERPL-MCNC: 222 PG/ML
PLATELET # BLD AUTO: 186 K/UL
POTASSIUM SERPL-SCNC: 4.6 MMOL/L
PROT SERPL-MCNC: 6.9 G/DL
RBC # BLD: 3.99 M/UL
RBC # FLD: 14.3 %
SODIUM SERPL-SCNC: 138 MMOL/L
TRIGL SERPL-MCNC: 87 MG/DL
URATE SERPL-MCNC: 6.9 MG/DL
WBC # FLD AUTO: 8.91 K/UL

## 2021-10-19 ENCOUNTER — APPOINTMENT (OUTPATIENT)
Dept: CARDIOLOGY | Facility: CLINIC | Age: 76
End: 2021-10-19
Payer: MEDICARE

## 2021-10-19 PROCEDURE — A9500: CPT

## 2021-10-19 PROCEDURE — 93015 CV STRESS TEST SUPVJ I&R: CPT

## 2021-10-19 PROCEDURE — 78452 HT MUSCLE IMAGE SPECT MULT: CPT

## 2021-10-19 RX ORDER — REGADENOSON 0.08 MG/ML
0.4 INJECTION, SOLUTION INTRAVENOUS
Qty: 1 | Refills: 0 | Status: COMPLETED | OUTPATIENT
Start: 2021-10-19

## 2021-10-19 RX ADMIN — REGADENOSON 5 MG/5ML: 0.08 INJECTION, SOLUTION INTRAVENOUS at 00:00

## 2021-10-20 ENCOUNTER — APPOINTMENT (OUTPATIENT)
Dept: WOUND CARE | Facility: CLINIC | Age: 76
End: 2021-10-20

## 2021-10-20 ENCOUNTER — APPOINTMENT (OUTPATIENT)
Dept: HEMATOLOGY ONCOLOGY | Facility: CLINIC | Age: 76
End: 2021-10-20
Payer: MEDICARE

## 2021-10-20 ENCOUNTER — NON-APPOINTMENT (OUTPATIENT)
Age: 76
End: 2021-10-20

## 2021-10-20 VITALS
TEMPERATURE: 97.7 F | HEIGHT: 70.67 IN | DIASTOLIC BLOOD PRESSURE: 70 MMHG | RESPIRATION RATE: 16 BRPM | OXYGEN SATURATION: 99 % | WEIGHT: 297.62 LBS | HEART RATE: 89 BPM | BODY MASS INDEX: 41.67 KG/M2 | SYSTOLIC BLOOD PRESSURE: 118 MMHG

## 2021-10-20 PROCEDURE — 99214 OFFICE O/P EST MOD 30 MIN: CPT

## 2021-10-20 NOTE — PHYSICAL EXAM
[Restricted in physically strenuous activity but ambulatory and able to carry out work of a light or sedentary nature] : Status 1- Restricted in physically strenuous activity but ambulatory and able to carry out work of a light or sedentary nature, e.g., light house work, office work [Obese] : obese [Normal] : affect appropriate [de-identified] : uses cane for balance and weakness [de-identified] : obese, left anterior incision for prior back surgery. Liver is not palpable. [de-identified] : 2+ bilateral ankle edema [de-identified] : thoracic and lumbar spine incision from 4 prior back surgeries with hardware [de-identified] : bilateral anterior knee incisions from prior knee replacements

## 2021-10-20 NOTE — HISTORY OF PRESENT ILLNESS
[Disease: _____________________] : Disease: [unfilled] [de-identified] : Mrs. Espinal is a 76 year old female with HTN, HLD presenting to the office for an initial consultation of metastatic neuroendocrine CA.\par \par Patient underwent CT chest on 7/20/2021 for dilated aorta which revealed unchanged dilated 4.4 cm ascending aorta.\par Fatty liver and superimposed new nodules versus nodular sparing. Further evaluation with contrast-enhanced CT or MRI is recommended\par \par MRI abdomen on 8/3/2021: Numerous hyperenhancing liver metastases. Correlate clinically with history of neuroendocrine tumor or melanoma. A 7 mm lesion in the left lobe anteriorly is amenable to CT-guided biopsy for definitive tissue characterization. Results discussed with Dr. Hameed at time of interpretation.\par \par FDG-PET/CT 8/13/2021: No evidence of FDG-avid disease.\par Nonvisualization of hyperenhancing hepatic lesions seen on MRI dated 8/3/2021. Differential diagnosis includes metastatic well-differentiated neuroendocrine tumor which may not be FDG-avid. Histologic correlation is recommended. DOTATATE-PET/CT may be performed if there is biopsy confirmation of neuroendocrine tumor.\par \par US guided liver bx on 8/17/2021 revealed metastatic well differentiated metastatic neuroendocrine CA.\par Ki index ~ 20%\par \par 9/23/21\par PET DOTATATE reviewed. \par there is a pancreatic primary.\par Pancreatic polypeptide is elevated > 2000\par All other functional biochemicals are negative.\par He has no symtoms from the disease.\par We discussed and review patient information regarding lanreotide.\par He signed a consent.\par will arrange to start lanreotide 120 mg q 4 weeks\par \par 10/20/2021:\par Patient is here for follow up s/p discharge from [facility] from 10/8-10/12.\par He was admitted for generalized Weakness, hypotension, and Diarrhea. \par Patient is here for follow up s/p discharge from [facility] from 10/8-10/12.\par He was admitted for generalized Weakness, hypotension, and Diarrhea. 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 Mojica (Vascular) on 10/29/2021. \par Patient with rash to the center of the buttock. He is using a cream prescribed by the hospital, that he states is helping with improvement. \par Patient also found to be in MINNIE.  Renal was consulted and patient's Minnie was thought to be due to ATN from hypotension (patient's BP was 81/47 on arrival) and recent GI losses. Patient received IVF and a sodium bicarbonate infusion with improvement in his kidney function.\par Patient is now back to baseline.\par Patient was also noted to have elevated Troponin however cardiology does not think its cardiac related.\par He is feeling stronger and denies diarrhea.  He is having one bowel movement every other day.  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 Mojica (Vascular) on 10/29/2021. \par Patient with rash to the center of the buttock. He is using a cream prescribed by the hospital, that he states is helping with improvement. \par Patient also found to be in MINNIE.  Renal was consulted and patient's Minnie was thought to be due to ATN from hypotension (patient's BP was 81/47 on arrival) and recent GI losses. Patient received IVF and a sodium bicarbonate infusion with improvement in his kidney function.\par Patient is now back to baseline.\par He is feeling stronger and denies diarrhea.  He is having one bowel movement every other day.  The stool is formed.\par His energy level is excellent.\par  [de-identified] : well differentiated neuroendocrine tumor [de-identified] : Internist/Cardiology: Agustin Hameed\par \par Yamilet - wife: [phone]\par Holly - patient : [phone]

## 2021-10-20 NOTE — REVIEW OF SYSTEMS
[Negative] : Allergic/Immunologic [FreeTextEntry9] : weakness and pain in back due to prior surgeries. [de-identified] : ankle swelling

## 2021-10-21 NOTE — BRIEF OPERATIVE NOTE - DISPOSITION
Patient states that she received a call from our office. There is no record in her chart of us trying to reach her.  I informed pt that I would send a message to Dr. Flaca Moody and the nurse ICU

## 2021-10-26 ENCOUNTER — APPOINTMENT (OUTPATIENT)
Dept: INFUSION THERAPY | Facility: HOSPITAL | Age: 76
End: 2021-10-26

## 2021-10-26 PROCEDURE — 86769 SARS-COV-2 COVID-19 ANTIBODY: CPT

## 2021-10-26 PROCEDURE — 84132 ASSAY OF SERUM POTASSIUM: CPT

## 2021-10-26 PROCEDURE — 86803 HEPATITIS C AB TEST: CPT

## 2021-10-26 PROCEDURE — 82947 ASSAY GLUCOSE BLOOD QUANT: CPT

## 2021-10-26 PROCEDURE — 81001 URINALYSIS AUTO W/SCOPE: CPT

## 2021-10-26 PROCEDURE — 85018 HEMOGLOBIN: CPT

## 2021-10-26 PROCEDURE — 93005 ELECTROCARDIOGRAM TRACING: CPT

## 2021-10-26 PROCEDURE — 84550 ASSAY OF BLOOD/URIC ACID: CPT

## 2021-10-26 PROCEDURE — 84100 ASSAY OF PHOSPHORUS: CPT

## 2021-10-26 PROCEDURE — 71045 X-RAY EXAM CHEST 1 VIEW: CPT

## 2021-10-26 PROCEDURE — 36415 COLL VENOUS BLD VENIPUNCTURE: CPT

## 2021-10-26 PROCEDURE — 83880 ASSAY OF NATRIURETIC PEPTIDE: CPT

## 2021-10-26 PROCEDURE — 85610 PROTHROMBIN TIME: CPT

## 2021-10-26 PROCEDURE — 74176 CT ABD & PELVIS W/O CONTRAST: CPT | Mod: MA

## 2021-10-26 PROCEDURE — 71250 CT THORAX DX C-: CPT | Mod: MA

## 2021-10-26 PROCEDURE — 0225U NFCT DS DNA&RNA 21 SARSCOV2: CPT

## 2021-10-26 PROCEDURE — 82435 ASSAY OF BLOOD CHLORIDE: CPT

## 2021-10-26 PROCEDURE — 93925 LOWER EXTREMITY STUDY: CPT

## 2021-10-26 PROCEDURE — 87086 URINE CULTURE/COLONY COUNT: CPT

## 2021-10-26 PROCEDURE — 83735 ASSAY OF MAGNESIUM: CPT

## 2021-10-26 PROCEDURE — 85025 COMPLETE CBC W/AUTO DIFF WBC: CPT

## 2021-10-26 PROCEDURE — 80053 COMPREHEN METABOLIC PANEL: CPT

## 2021-10-26 PROCEDURE — 85014 HEMATOCRIT: CPT

## 2021-10-26 PROCEDURE — 82330 ASSAY OF CALCIUM: CPT

## 2021-10-26 PROCEDURE — 85027 COMPLETE CBC AUTOMATED: CPT

## 2021-10-26 PROCEDURE — 76770 US EXAM ABDO BACK WALL COMP: CPT

## 2021-10-26 PROCEDURE — 99285 EMERGENCY DEPT VISIT HI MDM: CPT

## 2021-10-26 PROCEDURE — 97161 PT EVAL LOW COMPLEX 20 MIN: CPT

## 2021-10-26 PROCEDURE — 83935 ASSAY OF URINE OSMOLALITY: CPT

## 2021-10-26 PROCEDURE — 84295 ASSAY OF SERUM SODIUM: CPT

## 2021-10-26 PROCEDURE — 83605 ASSAY OF LACTIC ACID: CPT

## 2021-10-26 PROCEDURE — 80048 BASIC METABOLIC PNL TOTAL CA: CPT

## 2021-10-26 PROCEDURE — 82803 BLOOD GASES ANY COMBINATION: CPT

## 2021-10-26 PROCEDURE — 84540 ASSAY OF URINE/UREA-N: CPT

## 2021-10-26 PROCEDURE — 85730 THROMBOPLASTIN TIME PARTIAL: CPT

## 2021-10-26 PROCEDURE — 82570 ASSAY OF URINE CREATININE: CPT

## 2021-10-26 PROCEDURE — 93970 EXTREMITY STUDY: CPT

## 2021-10-26 PROCEDURE — 84484 ASSAY OF TROPONIN QUANT: CPT

## 2021-10-28 ENCOUNTER — OUTPATIENT (OUTPATIENT)
Dept: OUTPATIENT SERVICES | Facility: HOSPITAL | Age: 76
LOS: 1 days | Discharge: HOME | End: 2021-10-28

## 2021-10-28 DIAGNOSIS — Z96.60 PRESENCE OF UNSPECIFIED ORTHOPEDIC JOINT IMPLANT: Chronic | ICD-10-CM

## 2021-10-28 DIAGNOSIS — Z98.890 OTHER SPECIFIED POSTPROCEDURAL STATES: Chronic | ICD-10-CM

## 2021-10-28 DIAGNOSIS — C7A.8 OTHER MALIGNANT NEUROENDOCRINE TUMORS: ICD-10-CM

## 2021-10-28 DIAGNOSIS — Z96.653 PRESENCE OF ARTIFICIAL KNEE JOINT, BILATERAL: Chronic | ICD-10-CM

## 2021-10-29 ENCOUNTER — APPOINTMENT (OUTPATIENT)
Dept: VASCULAR SURGERY | Facility: CLINIC | Age: 76
End: 2021-10-29

## 2021-11-01 ENCOUNTER — APPOINTMENT (OUTPATIENT)
Dept: INFUSION THERAPY | Facility: HOSPITAL | Age: 76
End: 2021-11-01

## 2021-11-03 ENCOUNTER — APPOINTMENT (OUTPATIENT)
Dept: CARDIOLOGY | Facility: CLINIC | Age: 76
End: 2021-11-03
Payer: MEDICARE

## 2021-11-03 VITALS
BODY MASS INDEX: 42.23 KG/M2 | SYSTOLIC BLOOD PRESSURE: 118 MMHG | WEIGHT: 295 LBS | HEIGHT: 70 IN | TEMPERATURE: 98 F | HEART RATE: 103 BPM | DIASTOLIC BLOOD PRESSURE: 71 MMHG | OXYGEN SATURATION: 96 %

## 2021-11-03 DIAGNOSIS — Z71.85 ENCOUNTER FOR IMMUNIZATION SAFETY COUNSELING: ICD-10-CM

## 2021-11-03 PROCEDURE — 99213 OFFICE O/P EST LOW 20 MIN: CPT

## 2021-11-03 RX ORDER — COLCHICINE 0.6 MG/1
0.6 CAPSULE ORAL
Qty: 6 | Refills: 0 | Status: DISCONTINUED | COMMUNITY
Start: 2021-10-14 | End: 2021-11-03

## 2021-11-03 NOTE — HISTORY OF PRESENT ILLNESS
[FreeTextEntry1] : This is a 76 year old gentlemen who presents today for follow up. Recently seen in office on 10/14/21, referred to Dr. 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 Also started in Colchicine and Medrol dose pack for right toe pain. Patient states his pain has now resolved. Patient also followed up with Oncology regarding Neuroendocrine cancer, he was advised to start on Octreotide injections however reports this is costly for him and pharmacy is finding an alternative. He has no additional issues or concerns for today

## 2021-11-03 NOTE — PHYSICAL EXAM

## 2021-11-15 DIAGNOSIS — R79.9 ABNORMAL FINDING OF BLOOD CHEMISTRY, UNSPECIFIED: ICD-10-CM

## 2021-11-23 ENCOUNTER — APPOINTMENT (OUTPATIENT)
Dept: INFUSION THERAPY | Facility: HOSPITAL | Age: 76
End: 2021-11-23

## 2021-12-02 ENCOUNTER — APPOINTMENT (OUTPATIENT)
Dept: VASCULAR SURGERY | Facility: CLINIC | Age: 76
End: 2021-12-02
Payer: MEDICARE

## 2021-12-02 VITALS
BODY MASS INDEX: 41.52 KG/M2 | DIASTOLIC BLOOD PRESSURE: 72 MMHG | SYSTOLIC BLOOD PRESSURE: 111 MMHG | WEIGHT: 290 LBS | HEIGHT: 70 IN | HEART RATE: 79 BPM | TEMPERATURE: 97.9 F

## 2021-12-02 PROCEDURE — 93970 EXTREMITY STUDY: CPT

## 2021-12-02 PROCEDURE — 99204 OFFICE O/P NEW MOD 45 MIN: CPT

## 2021-12-03 ENCOUNTER — OUTPATIENT (OUTPATIENT)
Dept: OUTPATIENT SERVICES | Facility: HOSPITAL | Age: 76
LOS: 1 days | Discharge: HOME | End: 2021-12-03

## 2021-12-03 DIAGNOSIS — Z96.653 PRESENCE OF ARTIFICIAL KNEE JOINT, BILATERAL: Chronic | ICD-10-CM

## 2021-12-03 DIAGNOSIS — Z98.890 OTHER SPECIFIED POSTPROCEDURAL STATES: Chronic | ICD-10-CM

## 2021-12-03 DIAGNOSIS — Z96.60 PRESENCE OF UNSPECIFIED ORTHOPEDIC JOINT IMPLANT: Chronic | ICD-10-CM

## 2021-12-03 DIAGNOSIS — C7A.8 OTHER MALIGNANT NEUROENDOCRINE TUMORS: ICD-10-CM

## 2021-12-06 ENCOUNTER — APPOINTMENT (OUTPATIENT)
Dept: HEMATOLOGY ONCOLOGY | Facility: CLINIC | Age: 76
End: 2021-12-06
Payer: MEDICARE

## 2021-12-06 PROCEDURE — 99213 OFFICE O/P EST LOW 20 MIN: CPT | Mod: 95

## 2021-12-07 ENCOUNTER — RESULT REVIEW (OUTPATIENT)
Age: 76
End: 2021-12-07

## 2021-12-07 ENCOUNTER — APPOINTMENT (OUTPATIENT)
Dept: HEMATOLOGY ONCOLOGY | Facility: CLINIC | Age: 76
End: 2021-12-07

## 2021-12-07 LAB
BASOPHILS # BLD AUTO: 0.03 K/UL — SIGNIFICANT CHANGE UP (ref 0–0.2)
BASOPHILS NFR BLD AUTO: 0.4 % — SIGNIFICANT CHANGE UP (ref 0–2)
EOSINOPHIL # BLD AUTO: 0.26 K/UL — SIGNIFICANT CHANGE UP (ref 0–0.5)
EOSINOPHIL NFR BLD AUTO: 3.2 % — SIGNIFICANT CHANGE UP (ref 0–6)
HCT VFR BLD CALC: 39.7 % — SIGNIFICANT CHANGE UP (ref 39–50)
HGB BLD-MCNC: 13.3 G/DL — SIGNIFICANT CHANGE UP (ref 13–17)
IMM GRANULOCYTES NFR BLD AUTO: 0.2 % — SIGNIFICANT CHANGE UP (ref 0–1.5)
LYMPHOCYTES # BLD AUTO: 1.74 K/UL — SIGNIFICANT CHANGE UP (ref 1–3.3)
LYMPHOCYTES # BLD AUTO: 21.6 % — SIGNIFICANT CHANGE UP (ref 13–44)
MCHC RBC-ENTMCNC: 31.8 PG — SIGNIFICANT CHANGE UP (ref 27–34)
MCHC RBC-ENTMCNC: 33.5 G/DL — SIGNIFICANT CHANGE UP (ref 32–36)
MCV RBC AUTO: 95 FL — SIGNIFICANT CHANGE UP (ref 80–100)
MONOCYTES # BLD AUTO: 0.74 K/UL — SIGNIFICANT CHANGE UP (ref 0–0.9)
MONOCYTES NFR BLD AUTO: 9.2 % — SIGNIFICANT CHANGE UP (ref 2–14)
NEUTROPHILS # BLD AUTO: 5.25 K/UL — SIGNIFICANT CHANGE UP (ref 1.8–7.4)
NEUTROPHILS NFR BLD AUTO: 65.4 % — SIGNIFICANT CHANGE UP (ref 43–77)
NRBC # BLD: 0 /100 WBCS — SIGNIFICANT CHANGE UP (ref 0–0)
PLATELET # BLD AUTO: 199 K/UL — SIGNIFICANT CHANGE UP (ref 150–400)
RBC # BLD: 4.18 M/UL — LOW (ref 4.2–5.8)
RBC # FLD: 14.6 % — HIGH (ref 10.3–14.5)
WBC # BLD: 8.04 K/UL — SIGNIFICANT CHANGE UP (ref 3.8–10.5)
WBC # FLD AUTO: 8.04 K/UL — SIGNIFICANT CHANGE UP (ref 3.8–10.5)

## 2021-12-08 LAB
ALBUMIN SERPL ELPH-MCNC: 4.5 G/DL
ALP BLD-CCNC: 59 U/L
ALT SERPL-CCNC: 30 U/L
ANION GAP SERPL CALC-SCNC: 8 MMOL/L
AST SERPL-CCNC: 22 U/L
BILIRUB SERPL-MCNC: 0.5 MG/DL
BUN SERPL-MCNC: 23 MG/DL
CALCIUM SERPL-MCNC: 9.6 MG/DL
CHLORIDE SERPL-SCNC: 100 MMOL/L
CO2 SERPL-SCNC: 32 MMOL/L
CREAT SERPL-MCNC: 1.3 MG/DL
GLUCOSE SERPL-MCNC: 139 MG/DL
POTASSIUM SERPL-SCNC: 4 MMOL/L
PROT SERPL-MCNC: 6.5 G/DL
SODIUM SERPL-SCNC: 140 MMOL/L

## 2021-12-09 ENCOUNTER — APPOINTMENT (OUTPATIENT)
Dept: VASCULAR SURGERY | Facility: CLINIC | Age: 76
End: 2021-12-09

## 2021-12-14 ENCOUNTER — NON-APPOINTMENT (OUTPATIENT)
Age: 76
End: 2021-12-14

## 2021-12-14 ENCOUNTER — APPOINTMENT (OUTPATIENT)
Dept: INFUSION THERAPY | Facility: HOSPITAL | Age: 76
End: 2021-12-14

## 2021-12-21 ENCOUNTER — APPOINTMENT (OUTPATIENT)
Dept: INFUSION THERAPY | Facility: HOSPITAL | Age: 76
End: 2021-12-21

## 2021-12-27 ENCOUNTER — APPOINTMENT (OUTPATIENT)
Dept: CARDIOLOGY | Facility: CLINIC | Age: 76
End: 2021-12-27
Payer: MEDICARE

## 2021-12-27 VITALS
WEIGHT: 288 LBS | SYSTOLIC BLOOD PRESSURE: 110 MMHG | HEIGHT: 70 IN | DIASTOLIC BLOOD PRESSURE: 64 MMHG | BODY MASS INDEX: 41.23 KG/M2 | HEART RATE: 90 BPM | OXYGEN SATURATION: 98 % | TEMPERATURE: 98.4 F

## 2021-12-27 DIAGNOSIS — Z20.822 CONTACT WITH AND (SUSPECTED) EXPOSURE TO COVID-19: ICD-10-CM

## 2021-12-27 PROCEDURE — 99214 OFFICE O/P EST MOD 30 MIN: CPT | Mod: CS

## 2021-12-27 NOTE — PHYSICAL EXAM

## 2021-12-27 NOTE — HISTORY OF PRESENT ILLNESS
[FreeTextEntry1] : Holly is a 76 y.o male with a PMhx of Neuroendocrine Ca, PAD, CKD, HLD, HTN, who presents for follow up.\par \par Reports exposure to family members who tested positive for COVID19 yesterday. Did receive COVID19 Pfizer booster.\par \par Hx of neuroendocrine ca, sees Dr. Webster. Did start Octreotide injections.\par \par At last visit restarted Lasix for b/l le edema, saw vascular Denis, for 2 cauterization procedures in 2/2022.\par \par 10/19/2021 STP performed for RAINES. Normal MPI, no evidence of infarct or inducible ischemia noted post JESUS. Patient repeats no worsening of symptoms.\par \par Pending 1/2022 PT for walking difficultly. \par \par Denies chest pain, palpitations, diaphoresis, vision changes, HA, dizziness, syncope, cough, wheezing, SOB/RAINES, fever, chills, infection.

## 2021-12-29 ENCOUNTER — APPOINTMENT (OUTPATIENT)
Dept: CARDIOLOGY | Facility: CLINIC | Age: 76
End: 2021-12-29

## 2021-12-30 LAB
ALBUMIN SERPL ELPH-MCNC: 4.5 G/DL
ALBUMIN SERPL ELPH-MCNC: 4.5 G/DL
ALP BLD-CCNC: 60 U/L
ALP BLD-CCNC: 65 U/L
ALT SERPL-CCNC: 27 U/L
ALT SERPL-CCNC: 30 U/L
ANION GAP SERPL CALC-SCNC: 10 MMOL/L
ANION GAP SERPL CALC-SCNC: 12 MMOL/L
ANION GAP SERPL CALC-SCNC: 13 MMOL/L
ANION GAP SERPL CALC-SCNC: 16 MMOL/L
AST SERPL-CCNC: 22 U/L
AST SERPL-CCNC: 23 U/L
BASOPHILS # BLD AUTO: 0.04 K/UL
BASOPHILS # BLD AUTO: 0.04 K/UL
BASOPHILS # BLD AUTO: 0.06 K/UL
BASOPHILS NFR BLD AUTO: 0.4 %
BASOPHILS NFR BLD AUTO: 0.4 %
BASOPHILS NFR BLD AUTO: 0.7 %
BILIRUB DIRECT SERPL-MCNC: 0.1 MG/DL
BILIRUB INDIRECT SERPL-MCNC: 0.2 MG/DL
BILIRUB SERPL-MCNC: 0.4 MG/DL
BILIRUB SERPL-MCNC: 0.5 MG/DL
BUN SERPL-MCNC: 28 MG/DL
BUN SERPL-MCNC: 35 MG/DL
BUN SERPL-MCNC: 35 MG/DL
BUN SERPL-MCNC: 42 MG/DL
CALCIUM SERPL-MCNC: 9.4 MG/DL
CALCIUM SERPL-MCNC: 9.4 MG/DL
CALCIUM SERPL-MCNC: 9.5 MG/DL
CALCIUM SERPL-MCNC: 9.6 MG/DL
CHLORIDE SERPL-SCNC: 102 MMOL/L
CHLORIDE SERPL-SCNC: 103 MMOL/L
CHLORIDE SERPL-SCNC: 106 MMOL/L
CHLORIDE SERPL-SCNC: 108 MMOL/L
CHOLEST SERPL-MCNC: 138 MG/DL
CK SERPL-CCNC: 74 U/L
CO2 SERPL-SCNC: 22 MMOL/L
CO2 SERPL-SCNC: 22 MMOL/L
CO2 SERPL-SCNC: 25 MMOL/L
CO2 SERPL-SCNC: 27 MMOL/L
COVID-19 SPIKE DOMAIN ANTIBODY INTERPRETATION: POSITIVE
CREAT SERPL-MCNC: 1.3 MG/DL
CREAT SERPL-MCNC: 1.37 MG/DL
CREAT SERPL-MCNC: 1.44 MG/DL
CREAT SERPL-MCNC: 1.45 MG/DL
EOSINOPHIL # BLD AUTO: 0.11 K/UL
EOSINOPHIL # BLD AUTO: 0.2 K/UL
EOSINOPHIL # BLD AUTO: 0.25 K/UL
EOSINOPHIL NFR BLD AUTO: 1.2 %
EOSINOPHIL NFR BLD AUTO: 2.2 %
EOSINOPHIL NFR BLD AUTO: 2.7 %
FOLATE SERPL-MCNC: 8.9 NG/ML
GLUCOSE SERPL-MCNC: 107 MG/DL
GLUCOSE SERPL-MCNC: 114 MG/DL
GLUCOSE SERPL-MCNC: 121 MG/DL
GLUCOSE SERPL-MCNC: 153 MG/DL
HCT VFR BLD CALC: 39.3 %
HCT VFR BLD CALC: 42.3 %
HCT VFR BLD CALC: 45 %
HDLC SERPL-MCNC: 50 MG/DL
HGB BLD-MCNC: 12.9 G/DL
HGB BLD-MCNC: 14.4 G/DL
HGB BLD-MCNC: 14.7 G/DL
IMM GRANULOCYTES NFR BLD AUTO: 0.3 %
IMM GRANULOCYTES NFR BLD AUTO: 0.3 %
IMM GRANULOCYTES NFR BLD AUTO: 0.8 %
IRON SATN MFR SERPL: 22 %
IRON SERPL-MCNC: 58 UG/DL
LDLC SERPL CALC-MCNC: 56 MG/DL
LDLC SERPL DIRECT ASSAY-MCNC: 62 MG/DL
LYMPHOCYTES # BLD AUTO: 1.59 K/UL
LYMPHOCYTES # BLD AUTO: 2.02 K/UL
LYMPHOCYTES # BLD AUTO: 2.25 K/UL
LYMPHOCYTES NFR BLD AUTO: 17.9 %
LYMPHOCYTES NFR BLD AUTO: 22.2 %
LYMPHOCYTES NFR BLD AUTO: 24.4 %
MAN DIFF?: NORMAL
MCHC RBC-ENTMCNC: 31.7 PG
MCHC RBC-ENTMCNC: 32.4 PG
MCHC RBC-ENTMCNC: 32.4 PG
MCHC RBC-ENTMCNC: 32.7 GM/DL
MCHC RBC-ENTMCNC: 32.8 GM/DL
MCHC RBC-ENTMCNC: 34 GM/DL
MCV RBC AUTO: 95.1 FL
MCV RBC AUTO: 97 FL
MCV RBC AUTO: 98.7 FL
MONOCYTES # BLD AUTO: 0.8 K/UL
MONOCYTES # BLD AUTO: 0.8 K/UL
MONOCYTES # BLD AUTO: 0.89 K/UL
MONOCYTES NFR BLD AUTO: 8.8 %
MONOCYTES NFR BLD AUTO: 9 %
MONOCYTES NFR BLD AUTO: 9.7 %
NEUTROPHILS # BLD AUTO: 5.73 K/UL
NEUTROPHILS # BLD AUTO: 5.99 K/UL
NEUTROPHILS # BLD AUTO: 6.28 K/UL
NEUTROPHILS NFR BLD AUTO: 62.2 %
NEUTROPHILS NFR BLD AUTO: 66.1 %
NEUTROPHILS NFR BLD AUTO: 70.7 %
NONHDLC SERPL-MCNC: 88 MG/DL
PLATELET # BLD AUTO: 204 K/UL
PLATELET # BLD AUTO: 215 K/UL
PLATELET # BLD AUTO: 216 K/UL
POTASSIUM SERPL-SCNC: 3.5 MMOL/L
POTASSIUM SERPL-SCNC: 3.9 MMOL/L
POTASSIUM SERPL-SCNC: 5.3 MMOL/L
POTASSIUM SERPL-SCNC: 5.3 MMOL/L
PROT SERPL-MCNC: 6.7 G/DL
PROT SERPL-MCNC: 7.3 G/DL
RAPID RVP RESULT: NOT DETECTED
RBC # BLD: 3.98 M/UL
RBC # BLD: 4.45 M/UL
RBC # BLD: 4.64 M/UL
RBC # FLD: 14.5 %
RBC # FLD: 14.6 %
RBC # FLD: 14.6 %
SARS-COV-2 AB SERPL IA-ACNC: >250 U/ML
SARS-COV-2 RNA PNL RESP NAA+PROBE: NOT DETECTED
SODIUM SERPL-SCNC: 140 MMOL/L
SODIUM SERPL-SCNC: 141 MMOL/L
SODIUM SERPL-SCNC: 142 MMOL/L
SODIUM SERPL-SCNC: 143 MMOL/L
TIBC SERPL-MCNC: 261 UG/DL
TRIGL SERPL-MCNC: 156 MG/DL
UIBC SERPL-MCNC: 202 UG/DL
URATE SERPL-MCNC: 8.9 MG/DL
URATE SERPL-MCNC: 9.3 MG/DL
VIT B12 SERPL-MCNC: 483 PG/ML
WBC # FLD AUTO: 8.89 K/UL
WBC # FLD AUTO: 9.08 K/UL
WBC # FLD AUTO: 9.21 K/UL

## 2022-01-07 ENCOUNTER — OUTPATIENT (OUTPATIENT)
Dept: OUTPATIENT SERVICES | Facility: HOSPITAL | Age: 77
LOS: 1 days | Discharge: HOME | End: 2022-01-07

## 2022-01-07 DIAGNOSIS — Z96.60 PRESENCE OF UNSPECIFIED ORTHOPEDIC JOINT IMPLANT: Chronic | ICD-10-CM

## 2022-01-07 DIAGNOSIS — Z98.890 OTHER SPECIFIED POSTPROCEDURAL STATES: Chronic | ICD-10-CM

## 2022-01-07 DIAGNOSIS — C7A.8 OTHER MALIGNANT NEUROENDOCRINE TUMORS: ICD-10-CM

## 2022-01-07 DIAGNOSIS — Z96.653 PRESENCE OF ARTIFICIAL KNEE JOINT, BILATERAL: Chronic | ICD-10-CM

## 2022-01-09 NOTE — REVIEW OF SYSTEMS
[Negative] : Allergic/Immunologic [FreeTextEntry7] : diarrhea has subsided. [FreeTextEntry9] : weakness and pain in back due to prior surgeries. [de-identified] : ankle swelling

## 2022-01-09 NOTE — HISTORY OF PRESENT ILLNESS
[Home] : at home, [unfilled] , at the time of the visit. [Verbal consent obtained from patient] : the patient, [unfilled] [Disease: _____________________] : Disease: [unfilled] [de-identified] : Mrs. Espinal is a 76 year old female with HTN, HLD presenting to the office for an initial consultation of metastatic neuroendocrine CA.\par \par Patient underwent CT chest on 7/20/2021 for dilated aorta which revealed unchanged dilated 4.4 cm ascending aorta.\par Fatty liver and superimposed new nodules versus nodular sparing. Further evaluation with contrast-enhanced CT or MRI is recommended\par \par MRI abdomen on 8/3/2021: Numerous hyperenhancing liver metastases. Correlate clinically with history of neuroendocrine tumor or melanoma. A 7 mm lesion in the left lobe anteriorly is amenable to CT-guided biopsy for definitive tissue characterization. Results discussed with Dr. Hameed at time of interpretation.\par \par FDG-PET/CT 8/13/2021: No evidence of FDG-avid disease.\par Nonvisualization of hyperenhancing hepatic lesions seen on MRI dated 8/3/2021. Differential diagnosis includes metastatic well-differentiated neuroendocrine tumor which may not be FDG-avid. Histologic correlation is recommended. DOTATATE-PET/CT may be performed if there is biopsy confirmation of neuroendocrine tumor.\par \par US guided liver bx on 8/17/2021 revealed metastatic well differentiated metastatic neuroendocrine CA.\par Ki index ~ 20%\par \par 9/23/21\par PET DOTATATE reviewed. \par there is a pancreatic primary.\par Pancreatic polypeptide is elevated > 2000\par All other functional biochemicals are negative.\par He has no symtoms from the disease.\par We discussed and review patient information regarding lanreotide.\par He signed a consent.\par will arrange to start lanreotide 120 mg q 4 weeks\par \par 10/20/2021:\par Patient is here for follow up s/p discharge from [facility] from 10/8-10/12.\par He was admitted for generalized Weakness, hypotension, and Diarrhea. \par Patient is here for follow up s/p discharge from [facility] from 10/8-10/12.\par He was admitted for generalized Weakness, hypotension, and Diarrhea. 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 Mojica (Vascular) on 10/29/2021. \par Patient with rash to the center of the buttock. He is using a cream prescribed by the hospital, that he states is helping with improvement. \par Patient also found to be in MINNIE.  Renal was consulted and patient's Minnie was thought to be due to ATN from hypotension (patient's BP was 81/47 on arrival) and recent GI losses. Patient received IVF and a sodium bicarbonate infusion with improvement in his kidney function.\par Patient is now back to baseline.\par Patient was also noted to have elevated Troponin however cardiology does not think its cardiac related.\par He is feeling stronger and denies diarrhea.  He is having one bowel movement every other day.  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 Mojica (Vascular) on 10/29/2021. \par Patient with rash to the center of the buttock. He is using a cream prescribed by the hospital, that he states is helping with improvement. \par Patient also found to be in MINNIE.  Renal was consulted and patient's Minnie was thought to be due to ATN from hypotension (patient's BP was 81/47 on arrival) and recent GI losses. Patient received IVF and a sodium bicarbonate infusion with improvement in his kidney function.\par Patient is now back to baseline.\par He is feeling stronger and denies diarrhea.  He is having one bowel movement every other day.  The stool is formed.\par His energy level is excellent.\par \par 12/6/2021: TEB\par 1) Neuroendocrine: \par Patient is tolerating daily octreotide injections well however does not prefer s/q injections.\par Was on lanreotide but was admitted for diarrhea.\par Patient is having 2 bowel movements/day which is semi-formed, not watery.\par Patient is doing well today and voices no new complaints.\par His energy level and appetite remain stable.\par His spouse is giving him daily Octreotide injections but is having difficulty.\par Will transition patient over to acting octreotide (Sandostatin LAR) every 4 weeks.\par We reviewed most common adverse events.\par Patient will come tomorrow (12/7) for blood work (pancreatic polypeptide) [de-identified] : well differentiated neuroendocrine tumor [de-identified] : Internist/Cardiology: Agustin Hameed\par \par Yamilet - wife: [phone]\par Holly - patient : [phone]

## 2022-01-09 NOTE — PHYSICAL EXAM
[Restricted in physically strenuous activity but ambulatory and able to carry out work of a light or sedentary nature] : Status 1- Restricted in physically strenuous activity but ambulatory and able to carry out work of a light or sedentary nature, e.g., light house work, office work [Obese] : obese [Normal] : PERRL, EOMI, no conjunctival infection, anicteric [de-identified] : uses cane for balance and weakness [de-identified] : obese, left anterior incision for prior back surgery. Liver is not palpable. [de-identified] : 2+ bilateral ankle edema [de-identified] : thoracic and lumbar spine incision from 4 prior back surgeries with hardware [de-identified] : bilateral anterior knee incisions from prior knee replacements

## 2022-01-10 ENCOUNTER — RESULT REVIEW (OUTPATIENT)
Age: 77
End: 2022-01-10

## 2022-01-10 ENCOUNTER — APPOINTMENT (OUTPATIENT)
Dept: INFUSION THERAPY | Facility: HOSPITAL | Age: 77
End: 2022-01-10

## 2022-01-10 ENCOUNTER — APPOINTMENT (OUTPATIENT)
Dept: HEMATOLOGY ONCOLOGY | Facility: CLINIC | Age: 77
End: 2022-01-10
Payer: MEDICARE

## 2022-01-10 VITALS
RESPIRATION RATE: 18 BRPM | TEMPERATURE: 97.2 F | HEART RATE: 78 BPM | OXYGEN SATURATION: 99 % | HEIGHT: 70 IN | DIASTOLIC BLOOD PRESSURE: 67 MMHG | BODY MASS INDEX: 42.08 KG/M2 | WEIGHT: 293.94 LBS | SYSTOLIC BLOOD PRESSURE: 122 MMHG

## 2022-01-10 LAB
BASOPHILS # BLD AUTO: 0.04 K/UL — SIGNIFICANT CHANGE UP (ref 0–0.2)
BASOPHILS NFR BLD AUTO: 0.5 % — SIGNIFICANT CHANGE UP (ref 0–2)
EOSINOPHIL # BLD AUTO: 0.23 K/UL — SIGNIFICANT CHANGE UP (ref 0–0.5)
EOSINOPHIL NFR BLD AUTO: 2.9 % — SIGNIFICANT CHANGE UP (ref 0–6)
HCT VFR BLD CALC: 40.3 % — SIGNIFICANT CHANGE UP (ref 39–50)
HGB BLD-MCNC: 13.4 G/DL — SIGNIFICANT CHANGE UP (ref 13–17)
IMM GRANULOCYTES NFR BLD AUTO: 0.5 % — SIGNIFICANT CHANGE UP (ref 0–1.5)
LYMPHOCYTES # BLD AUTO: 1.69 K/UL — SIGNIFICANT CHANGE UP (ref 1–3.3)
LYMPHOCYTES # BLD AUTO: 20.9 % — SIGNIFICANT CHANGE UP (ref 13–44)
MCHC RBC-ENTMCNC: 32.1 PG — SIGNIFICANT CHANGE UP (ref 27–34)
MCHC RBC-ENTMCNC: 33.3 G/DL — SIGNIFICANT CHANGE UP (ref 32–36)
MCV RBC AUTO: 96.4 FL — SIGNIFICANT CHANGE UP (ref 80–100)
MONOCYTES # BLD AUTO: 0.8 K/UL — SIGNIFICANT CHANGE UP (ref 0–0.9)
MONOCYTES NFR BLD AUTO: 9.9 % — SIGNIFICANT CHANGE UP (ref 2–14)
NEUTROPHILS # BLD AUTO: 5.27 K/UL — SIGNIFICANT CHANGE UP (ref 1.8–7.4)
NEUTROPHILS NFR BLD AUTO: 65.3 % — SIGNIFICANT CHANGE UP (ref 43–77)
NRBC # BLD: 0 /100 WBCS — SIGNIFICANT CHANGE UP (ref 0–0)
PLATELET # BLD AUTO: 179 K/UL — SIGNIFICANT CHANGE UP (ref 150–400)
RBC # BLD: 4.18 M/UL — LOW (ref 4.2–5.8)
RBC # FLD: 14.7 % — HIGH (ref 10.3–14.5)
WBC # BLD: 8.07 K/UL — SIGNIFICANT CHANGE UP (ref 3.8–10.5)
WBC # FLD AUTO: 8.07 K/UL — SIGNIFICANT CHANGE UP (ref 3.8–10.5)

## 2022-01-10 PROCEDURE — 99213 OFFICE O/P EST LOW 20 MIN: CPT

## 2022-01-10 NOTE — HISTORY OF PRESENT ILLNESS
[Disease: _____________________] : Disease: [unfilled] [Home] : at home, [unfilled] , at the time of the visit. [Verbal consent obtained from patient] : the patient, [unfilled] [de-identified] : Mrs. Espinal is a 76 year old female with HTN, HLD presenting to the office for an initial consultation of metastatic neuroendocrine CA.\par \par Patient underwent CT chest on 7/20/2021 for dilated aorta which revealed unchanged dilated 4.4 cm ascending aorta.\par Fatty liver and superimposed new nodules versus nodular sparing. Further evaluation with contrast-enhanced CT or MRI is recommended\par \par MRI abdomen on 8/3/2021: Numerous hyperenhancing liver metastases. Correlate clinically with history of neuroendocrine tumor or melanoma. A 7 mm lesion in the left lobe anteriorly is amenable to CT-guided biopsy for definitive tissue characterization. Results discussed with Dr. Hameed at time of interpretation.\par \par FDG-PET/CT 8/13/2021: No evidence of FDG-avid disease.\par Nonvisualization of hyperenhancing hepatic lesions seen on MRI dated 8/3/2021. Differential diagnosis includes metastatic well-differentiated neuroendocrine tumor which may not be FDG-avid. Histologic correlation is recommended. DOTATATE-PET/CT may be performed if there is biopsy confirmation of neuroendocrine tumor.\par \par US guided liver bx on 8/17/2021 revealed metastatic well differentiated metastatic neuroendocrine CA.\par Ki index ~ 20%\par \par 9/23/21\par PET DOTATATE reviewed. \par there is a pancreatic primary.\par Pancreatic polypeptide is elevated > 2000\par All other functional biochemicals are negative.\par He has no symtoms from the disease.\par We discussed and review patient information regarding lanreotide.\par He signed a consent.\par will arrange to start lanreotide 120 mg q 4 weeks\par \par 10/20/2021:\par Patient is here for follow up s/p discharge from [facility] from 10/8-10/12.\par He was admitted for generalized Weakness, hypotension, and Diarrhea. \par Patient is here for follow up s/p discharge from [facility] from 10/8-10/12.\par He was admitted for generalized Weakness, hypotension, and Diarrhea. 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 Mojica (Vascular) on 10/29/2021. \par Patient with rash to the center of the buttock. He is using a cream prescribed by the hospital, that he states is helping with improvement. \par Patient also found to be in MINNIE.  Renal was consulted and patient's Minnie was thought to be due to ATN from hypotension (patient's BP was 81/47 on arrival) and recent GI losses. Patient received IVF and a sodium bicarbonate infusion with improvement in his kidney function.\par Patient is now back to baseline.\par Patient was also noted to have elevated Troponin however cardiology does not think its cardiac related.\par He is feeling stronger and denies diarrhea.  He is having one bowel movement every other day.  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 Mojica (Vascular) on 10/29/2021. \par Patient with rash to the center of the buttock. He is using a cream prescribed by the hospital, that he states is helping with improvement. \par Patient also found to be in MINNIE.  Renal was consulted and patient's Minnie was thought to be due to ATN from hypotension (patient's BP was 81/47 on arrival) and recent GI losses. Patient received IVF and a sodium bicarbonate infusion with improvement in his kidney function.\par Patient is now back to baseline.\par He is feeling stronger and denies diarrhea.  He is having one bowel movement every other day.  The stool is formed.\par His energy level is excellent.\par \par 12/6/2021: TEB\par 1) Neuroendocrine: \par Patient is tolerating daily octreotide injections well however does not prefer s/q injections.\par Was on lanreotide but was admitted for diarrhea.\par Patient is having 2 bowel movements/day which is semi-formed, not watery.\par Patient is doing well today and voices no new complaints.\par His energy level and appetite remain stable.\par His spouse is giving him daily Octreotide injections but is having difficulty.\par Will transition patient over to acting octreotide (Sandostatin LAR) every 4 weeks.\par We reviewed most common adverse events.\par Patient will come tomorrow (12/7) for blood work (pancreatic polypeptide)\par \par 1/10/2022:\par Patient is here for C2 Octreotide.\par He is tolerating treatment relatively well.\par Admits to intermittent diarrhea.  At most has 2 bowel movements/day which is not bothersome.\par Denies abdominal pain or diarrhea today.\par He underwent lab work with his internist, Dr. Hameed.\par Labs are unremarkable.\par His pancreatic polypeptide levels were elevated at baseline, will redraw today.\par No other complaints.\par  [de-identified] : well differentiated neuroendocrine tumor [de-identified] : Internist/Cardiology: Agustin Hameed\par \par Yamilet - wife: [phone]\par Holly - patient : [phone]

## 2022-01-10 NOTE — PHYSICAL EXAM
[Restricted in physically strenuous activity but ambulatory and able to carry out work of a light or sedentary nature] : Status 1- Restricted in physically strenuous activity but ambulatory and able to carry out work of a light or sedentary nature, e.g., light house work, office work [Obese] : obese [Normal] : affect appropriate [de-identified] : uses cane for balance and weakness [de-identified] : obese, left anterior incision for prior back surgery. Liver is not palpable. [de-identified] : 2+ bilateral ankle edema [de-identified] : thoracic and lumbar spine incision from 4 prior back surgeries with hardware [de-identified] : bilateral anterior knee incisions from prior knee replacements

## 2022-01-10 NOTE — REVIEW OF SYSTEMS
[Negative] : Allergic/Immunologic [FreeTextEntry7] : diarrhea has subsided. [FreeTextEntry9] : weakness and pain in back due to prior surgeries. [de-identified] : ankle swelling

## 2022-01-19 LAB — PANC POLYPEPT SERPL-MCNC: 1040 PG/ML

## 2022-01-20 LAB — PANC POLYPEPT SERPL-MCNC: 1160 PG/ML

## 2022-02-03 ENCOUNTER — OUTPATIENT (OUTPATIENT)
Dept: OUTPATIENT SERVICES | Facility: HOSPITAL | Age: 77
LOS: 1 days | Discharge: HOME | End: 2022-02-03

## 2022-02-03 DIAGNOSIS — Z98.890 OTHER SPECIFIED POSTPROCEDURAL STATES: Chronic | ICD-10-CM

## 2022-02-03 DIAGNOSIS — C7A.8 OTHER MALIGNANT NEUROENDOCRINE TUMORS: ICD-10-CM

## 2022-02-03 DIAGNOSIS — Z96.653 PRESENCE OF ARTIFICIAL KNEE JOINT, BILATERAL: Chronic | ICD-10-CM

## 2022-02-03 DIAGNOSIS — Z96.60 PRESENCE OF UNSPECIFIED ORTHOPEDIC JOINT IMPLANT: Chronic | ICD-10-CM

## 2022-02-07 ENCOUNTER — APPOINTMENT (OUTPATIENT)
Dept: INFUSION THERAPY | Facility: HOSPITAL | Age: 77
End: 2022-02-07

## 2022-02-07 RX ORDER — LIDOCAINE HYDROCHLORIDE 10 MG/ML
1 INJECTION, SOLUTION INFILTRATION; PERINEURAL
Qty: 50 | Refills: 0 | Status: COMPLETED | COMMUNITY
Start: 2022-02-07 | End: 2022-02-18

## 2022-02-07 RX ORDER — SODIUM BICARBONATE 84 MG/ML
8.4 INJECTION, SOLUTION INTRAVENOUS
Qty: 5 | Refills: 0 | Status: COMPLETED | COMMUNITY
Start: 2022-02-07 | End: 2022-02-18

## 2022-02-15 ENCOUNTER — APPOINTMENT (OUTPATIENT)
Dept: VASCULAR SURGERY | Facility: CLINIC | Age: 77
End: 2022-02-15

## 2022-02-16 LAB — SARS-COV-2 N GENE NPH QL NAA+PROBE: NOT DETECTED

## 2022-02-18 ENCOUNTER — APPOINTMENT (OUTPATIENT)
Dept: VASCULAR SURGERY | Facility: CLINIC | Age: 77
End: 2022-02-18
Payer: MEDICARE

## 2022-02-18 PROCEDURE — 36475 ENDOVENOUS RF 1ST VEIN: CPT | Mod: RT

## 2022-02-18 NOTE — PROCEDURE
[FreeTextEntry1] : right GSV RFA [FreeTextEntry3] : Procedural safety checklist and time out completed:\par Confirmed patient identification (Patient Name, , and/or medical record number including when possible affirmation by patient or parent/family/other).\par Confirmed procedure with the patient. Consent present, accurate and signed. \par Confirmed special equipment and supplies are present.\par Sterility confirmed. Position verified. \par Site/ side is marked and visible and confirmed. \par Procedure confirmed by consent. Accurate consent including side and site.\par Review of medical records, including venous ultrasound, noting correct procedure including site and side.\par MD/PA verifies presence and review of imaging studies and or written report of imaging studies.\par Agreement on the procedure to be performed\par Time out completed.\par All of the above has been confirmed by the team.\par All patient-specific concerns have been addressed. \par \par Indication: right  lower extremity varicose veins with inflammation, leg pain, leg swelling, and leg cramping.  Venous insufficiency/ reflux.\par \par Procedure: radiofrequency ablation of the right great saphenous vein. \par 	\par Mr. NANDO HERNANDEZ is a 76 year old M with a history of right lower extremity varicose veins previously seen in the office.  Ultrasound examination demonstrated venous insufficiency. A trial of compression stockings, exercise, elevation, and pain medication was attempted without relief and definitive treatment with radiofrequency ablation was offered. \par \par The patient has come for radiofrequency ablation treatment of the right great saphenous vein.\par I have discussed the risks of the procedure at length with the patient. The risks discussed were inclusive of but not limited to infection, irritation at the site of infiltration of local anesthesia, and also rare risk of deep venous thrombosis and pulmonary emboli. The patient agrees to proceed with the procedure. \par The patient was escorted into the procedure room and a time out called.\par The entire limb was prepped and draped in sterile fashion. The RF fiber was placed on the sterile field and connected by a sterile cable. Actuation, temperature, and impedance testing were performed to ensure that all components were connected and operating properly. \par The patient was placed on the procedure table and local anesthesia was instilled in the skin overlying the access site. Under ultrasound guidance, the vein was punctured with a micropuncture needle, using the anterior wall technique. A guide wire was now introduced through the needle, and the needle was then exchanged over the guide wire for a 7F sheath. The guide wire was removed and the RF probe was then placed into the right great saphenous vein through the sheath and position confirmed using ultrasound guidance. After the RF probe position was verified by ultrasound, tumescent anesthesia consisting of normal saline, 1% lidocaine with 8.4% sodium bicarbonate was infiltrated, under ultrasound guidance, precisely into the perivenous compartment along the entire length of the vein until a “halo” of fluid was noted around the vein. After RF probe position was again confirmed with ultrasound imaging, RF energy was applied. The probe was gradually and carefully withdrawn at a rate of 6.5cm/20seconds. \par \par 9 cycles of RF performed using the 7 cm probe\par Total treatment time was _180 seconds.\par The total volume injected was 475 cc\par Treatment length was 52 cm and \par The probe is 3.6 cm from the SFJ.\par \par Estimated Blood Loss: minimal\par Repeat ultrasound of the treated vein was performed confirming successful treatment. The catheter and sheath were withdrawn and hemostasis established with direct pressure. After assuring hemostasis, a sterile 4x4 was placed on the access site and an ACE compression wrap was applied. Patient tolerated procedure well. Patient was given post-procedure instructions and follow up appointment was scheduled.\par \par \par

## 2022-02-21 NOTE — H&P ADULT - DOES THIS PATIENT HAVE A HISTORY OF OR HAS BEEN DX WITH HEART FAILURE?
Restorative Technician Note      Patient Name: Cindy Lewis     Restorative Tech Visit Date: 02/21/22  Note Type: Mobility  Patient Position Upon Consult: Supine  Activity Performed: Ambulated  Assistive Device: Roller walker  Patient Position at End of Consult: Supine;  All needs within reach; Bed/Chair alarm activated no

## 2022-02-22 RX ORDER — ALPRAZOLAM 0.5 MG/1
0.5 TABLET ORAL
Qty: 1 | Refills: 0 | Status: COMPLETED | COMMUNITY
Start: 2022-02-07 | End: 2022-02-28

## 2022-02-22 RX ORDER — SODIUM BICARBONATE 84 MG/ML
8.4 INJECTION, SOLUTION INTRAVENOUS
Qty: 5 | Refills: 0 | Status: COMPLETED | COMMUNITY
Start: 2022-02-22 | End: 2022-02-28

## 2022-02-22 RX ORDER — LIDOCAINE HYDROCHLORIDE 10 MG/ML
1 INJECTION, SOLUTION INFILTRATION; PERINEURAL
Qty: 50 | Refills: 0 | Status: COMPLETED | COMMUNITY
Start: 2022-02-22 | End: 2022-02-28

## 2022-02-24 DIAGNOSIS — Z20.822 ENCOUNTER FOR PREPROCEDURAL LABORATORY EXAMINATION: ICD-10-CM

## 2022-02-24 DIAGNOSIS — Z01.812 ENCOUNTER FOR PREPROCEDURAL LABORATORY EXAMINATION: ICD-10-CM

## 2022-02-25 ENCOUNTER — APPOINTMENT (OUTPATIENT)
Dept: VASCULAR SURGERY | Facility: CLINIC | Age: 77
End: 2022-02-25
Payer: MEDICARE

## 2022-02-25 PROCEDURE — 93971 EXTREMITY STUDY: CPT

## 2022-02-27 LAB — SARS-COV-2 N GENE NPH QL NAA+PROBE: NOT DETECTED

## 2022-02-28 ENCOUNTER — APPOINTMENT (OUTPATIENT)
Dept: VASCULAR SURGERY | Facility: CLINIC | Age: 77
End: 2022-02-28
Payer: MEDICARE

## 2022-02-28 DIAGNOSIS — I83.893 VARICOSE VEINS OF BILATERAL LOWER EXTREMITIES WITH OTHER COMPLICATIONS: ICD-10-CM

## 2022-02-28 PROCEDURE — 36475 ENDOVENOUS RF 1ST VEIN: CPT | Mod: LT

## 2022-02-28 NOTE — PROCEDURE
[FreeTextEntry1] : left GSV RFA [FreeTextEntry3] : Procedural safety checklist and time out completed:\par Confirmed patient identification (Patient Name, , and/or medical record number including when possible affirmation by patient or parent/family/other).\par Confirmed procedure with the patient. Consent present, accurate and signed. \par Confirmed special equipment and supplies are present.\par Sterility confirmed. Position verified. \par Site/ side is marked and visible and confirmed. \par Procedure confirmed by consent. Accurate consent including side and site.\par Review of medical records, including venous ultrasound, noting correct procedure including site and side.\par MD/PA verifies presence and review of imaging studies and or written report of imaging studies.\par Agreement on the procedure to be performed\par Time out completed.\par All of the above has been confirmed by the team.\par All patient-specific concerns have been addressed. \par \par Indication: left lower extremity varicose veins with inflammation, leg pain, leg swelling, and leg cramping.  Venous insufficiency/ reflux.\par \par Procedure: radiofrequency ablation of the left great saphenous vein. \par 	\par Mr. NANDO HERNANDEZ is a 76 year old M with a history of left lower extremity varicose veins previously seen in the office.  Ultrasound examination demonstrated venous insufficiency. A trial of compression stockings, exercise, elevation, and pain medication was attempted without relief and definitive treatment with radiofrequency ablation was offered. \par \par The patient has come for radiofrequency ablation treatment of the left great saphenous vein.\par I have discussed the risks of the procedure at length with the patient. The risks discussed were inclusive of but not limited to infection, irritation at the site of infiltration of local anesthesia, and also rare risk of deep venous thrombosis and pulmonary emboli. The patient agrees to proceed with the procedure. \par The patient was escorted into the procedure room and a time out called.\par The entire limb was prepped and draped in sterile fashion. The RF fiber was placed on the sterile field and connected by a sterile cable. Actuation, temperature, and impedance testing were performed to ensure that all components were connected and operating properly. \par The patient was placed on the procedure table and local anesthesia was instilled in the skin overlying the access site. Under ultrasound guidance, the vein was punctured with a micropuncture needle, using the anterior wall technique. A guide wire was now introduced through the needle, and the needle was then exchanged over the guide wire for a 7F sheath. The guide wire was removed and the RF probe was then placed into the left great saphenous vein through the sheath and position confirmed using ultrasound guidance. After the RF probe position was verified by ultrasound, tumescent anesthesia consisting of normal saline, 1% lidocaine with 8.4% sodium bicarbonate was infiltrated, under ultrasound guidance, precisely into the perivenous compartment along the entire length of the vein until a “halo” of fluid was noted around the vein. After RF probe position was again confirmed with ultrasound imaging, RF energy was applied. The probe was gradually and carefully withdrawn at a rate of 6.5cm/20seconds. \par \par 4 cycles of RF performed using the _7 cm probe\par Total treatment time was 80 seconds.\par The total volume injected was 425  cc\par Treatment length was 20 cm and \par The probe is 3.6 cm from the SFJ.\par \par Estimated Blood Loss: minimal\par Repeat ultrasound of the treated vein was performed confirming successful treatment. The catheter and sheath were withdrawn and hemostasis established with direct pressure. After assuring hemostasis, a sterile 4x4 was placed on the access site and an ACE compression wrap was applied. Patient tolerated procedure well. Patient was given post-procedure instructions and follow up appointment was scheduled.\par \par \par

## 2022-03-03 ENCOUNTER — APPOINTMENT (OUTPATIENT)
Dept: VASCULAR SURGERY | Facility: CLINIC | Age: 77
End: 2022-03-03
Payer: MEDICARE

## 2022-03-03 ENCOUNTER — OUTPATIENT (OUTPATIENT)
Dept: OUTPATIENT SERVICES | Facility: HOSPITAL | Age: 77
LOS: 1 days | Discharge: HOME | End: 2022-03-03

## 2022-03-03 DIAGNOSIS — Z98.890 OTHER SPECIFIED POSTPROCEDURAL STATES: Chronic | ICD-10-CM

## 2022-03-03 DIAGNOSIS — Z96.60 PRESENCE OF UNSPECIFIED ORTHOPEDIC JOINT IMPLANT: Chronic | ICD-10-CM

## 2022-03-03 DIAGNOSIS — Z96.653 PRESENCE OF ARTIFICIAL KNEE JOINT, BILATERAL: Chronic | ICD-10-CM

## 2022-03-03 DIAGNOSIS — C7A.8 OTHER MALIGNANT NEUROENDOCRINE TUMORS: ICD-10-CM

## 2022-03-03 PROCEDURE — 93971 EXTREMITY STUDY: CPT

## 2022-03-07 ENCOUNTER — RESULT REVIEW (OUTPATIENT)
Age: 77
End: 2022-03-07

## 2022-03-07 ENCOUNTER — APPOINTMENT (OUTPATIENT)
Dept: INFUSION THERAPY | Facility: HOSPITAL | Age: 77
End: 2022-03-07

## 2022-03-07 ENCOUNTER — APPOINTMENT (OUTPATIENT)
Dept: HEMATOLOGY ONCOLOGY | Facility: CLINIC | Age: 77
End: 2022-03-07
Payer: MEDICARE

## 2022-03-07 VITALS
WEIGHT: 289.89 LBS | SYSTOLIC BLOOD PRESSURE: 124 MMHG | HEART RATE: 80 BPM | BODY MASS INDEX: 41.6 KG/M2 | TEMPERATURE: 97.3 F | DIASTOLIC BLOOD PRESSURE: 75 MMHG | OXYGEN SATURATION: 98 % | RESPIRATION RATE: 18 BRPM

## 2022-03-07 LAB
BASOPHILS # BLD AUTO: 0.04 K/UL — SIGNIFICANT CHANGE UP (ref 0–0.2)
BASOPHILS NFR BLD AUTO: 0.5 % — SIGNIFICANT CHANGE UP (ref 0–2)
EOSINOPHIL # BLD AUTO: 0.22 K/UL — SIGNIFICANT CHANGE UP (ref 0–0.5)
EOSINOPHIL NFR BLD AUTO: 2.8 % — SIGNIFICANT CHANGE UP (ref 0–6)
HCT VFR BLD CALC: 41.9 % — SIGNIFICANT CHANGE UP (ref 39–50)
HGB BLD-MCNC: 14.2 G/DL — SIGNIFICANT CHANGE UP (ref 13–17)
IMM GRANULOCYTES NFR BLD AUTO: 0.3 % — SIGNIFICANT CHANGE UP (ref 0–1.5)
LYMPHOCYTES # BLD AUTO: 1.89 K/UL — SIGNIFICANT CHANGE UP (ref 1–3.3)
LYMPHOCYTES # BLD AUTO: 23.7 % — SIGNIFICANT CHANGE UP (ref 13–44)
MCHC RBC-ENTMCNC: 31.2 PG — SIGNIFICANT CHANGE UP (ref 27–34)
MCHC RBC-ENTMCNC: 33.9 G/DL — SIGNIFICANT CHANGE UP (ref 32–36)
MCV RBC AUTO: 92.1 FL — SIGNIFICANT CHANGE UP (ref 80–100)
MONOCYTES # BLD AUTO: 0.77 K/UL — SIGNIFICANT CHANGE UP (ref 0–0.9)
MONOCYTES NFR BLD AUTO: 9.7 % — SIGNIFICANT CHANGE UP (ref 2–14)
NEUTROPHILS # BLD AUTO: 5.02 K/UL — SIGNIFICANT CHANGE UP (ref 1.8–7.4)
NEUTROPHILS NFR BLD AUTO: 63 % — SIGNIFICANT CHANGE UP (ref 43–77)
NRBC # BLD: 0 /100 WBCS — SIGNIFICANT CHANGE UP (ref 0–0)
PLATELET # BLD AUTO: 186 K/UL — SIGNIFICANT CHANGE UP (ref 150–400)
RBC # BLD: 4.55 M/UL — SIGNIFICANT CHANGE UP (ref 4.2–5.8)
RBC # FLD: 14.6 % — HIGH (ref 10.3–14.5)
WBC # BLD: 7.96 K/UL — SIGNIFICANT CHANGE UP (ref 3.8–10.5)
WBC # FLD AUTO: 7.96 K/UL — SIGNIFICANT CHANGE UP (ref 3.8–10.5)

## 2022-03-07 PROCEDURE — 99213 OFFICE O/P EST LOW 20 MIN: CPT

## 2022-03-07 NOTE — HISTORY OF PRESENT ILLNESS
[Disease: _____________________] : Disease: [unfilled] [Home] : at home, [unfilled] , at the time of the visit. [Verbal consent obtained from patient] : the patient, [unfilled] [de-identified] : Mrs. Espinal is a 76 year old female with HTN, HLD presenting to the office for an initial consultation of metastatic neuroendocrine CA.\par \par Patient underwent CT chest on 7/20/2021 for dilated aorta which revealed unchanged dilated 4.4 cm ascending aorta.\par Fatty liver and superimposed new nodules versus nodular sparing. Further evaluation with contrast-enhanced CT or MRI is recommended\par \par MRI abdomen on 8/3/2021: Numerous hyperenhancing liver metastases. Correlate clinically with history of neuroendocrine tumor or melanoma. A 7 mm lesion in the left lobe anteriorly is amenable to CT-guided biopsy for definitive tissue characterization. Results discussed with Dr. Hameed at time of interpretation.\par \par FDG-PET/CT 8/13/2021: No evidence of FDG-avid disease.\par Nonvisualization of hyperenhancing hepatic lesions seen on MRI dated 8/3/2021. Differential diagnosis includes metastatic well-differentiated neuroendocrine tumor which may not be FDG-avid. Histologic correlation is recommended. DOTATATE-PET/CT may be performed if there is biopsy confirmation of neuroendocrine tumor.\par \par US guided liver bx on 8/17/2021 revealed metastatic well differentiated metastatic neuroendocrine CA.\par Ki index ~ 20%\par \par 9/23/21\par PET DOTATATE reviewed. \par there is a pancreatic primary.\par Pancreatic polypeptide is elevated > 2000\par All other functional biochemicals are negative.\par He has no symtoms from the disease.\par We discussed and review patient information regarding lanreotide.\par He signed a consent.\par will arrange to start lanreotide 120 mg q 4 weeks\par \par 10/20/2021:\par Patient is here for follow up s/p discharge from [facility] from 10/8-10/12.\par He was admitted for generalized Weakness, hypotension, and Diarrhea. \par Patient is here for follow up s/p discharge from [facility] from 10/8-10/12.\par He was admitted for generalized Weakness, hypotension, and Diarrhea. 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 Mojica (Vascular) on 10/29/2021. \par Patient with rash to the center of the buttock. He is using a cream prescribed by the hospital, that he states is helping with improvement. \par Patient also found to be in MINNIE.  Renal was consulted and patient's Minnie was thought to be due to ATN from hypotension (patient's BP was 81/47 on arrival) and recent GI losses. Patient received IVF and a sodium bicarbonate infusion with improvement in his kidney function.\par Patient is now back to baseline.\par Patient was also noted to have elevated Troponin however cardiology does not think its cardiac related.\par He is feeling stronger and denies diarrhea.  He is having one bowel movement every other day.  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 Mojica (Vascular) on 10/29/2021. \par Patient with rash to the center of the buttock. He is using a cream prescribed by the hospital, that he states is helping with improvement. \par Patient also found to be in MINNIE.  Renal was consulted and patient's Minnie was thought to be due to ATN from hypotension (patient's BP was 81/47 on arrival) and recent GI losses. Patient received IVF and a sodium bicarbonate infusion with improvement in his kidney function.\par Patient is now back to baseline.\par He is feeling stronger and denies diarrhea.  He is having one bowel movement every other day.  The stool is formed.\par His energy level is excellent.\par \par 12/6/2021: TEB\par 1) Neuroendocrine: \par Patient is tolerating daily octreotide injections well however does not prefer s/q injections.\par Was on lanreotide but was admitted for diarrhea.\par Patient is having 2 bowel movements/day which is semi-formed, not watery.\par Patient is doing well today and voices no new complaints.\par His energy level and appetite remain stable.\par His spouse is giving him daily Octreotide injections but is having difficulty.\par Will transition patient over to acting octreotide (Sandostatin LAR) every 4 weeks.\par We reviewed most common adverse events.\par Patient will come tomorrow (12/7) for blood work (pancreatic polypeptide)\par \par 1/10/2022:\par Patient is here for C2 Octreotide.\par He is tolerating treatment relatively well.\par Admits to intermittent diarrhea.  At most has 2 bowel movements/day which is not bothersome.\par Denies abdominal pain or diarrhea today.\par He underwent lab work with his internist, Dr. Hameed.\par Labs are unremarkable.\par His pancreatic polypeptide levels were elevated at baseline, will redraw today.\par No other complaints.\par \par 3/7/2022:\par C4 Octreotide 20 mg today.\par He is tolerating injection well and voices no major adverse events.\par Admits to diarrhea on day 1 of his cycle which subsides afterwards.\par On average has 2 formed bowel movements/day.\par Denies abdominal pain/cramping or fatigue.\par No restrictions with his ADLs.\par \par  [de-identified] : well differentiated neuroendocrine tumor [de-identified] : Internist/Cardiology: Agustin Hameed\par \par Yamilet - wife: [phone]\par Holly - patient : [phone]

## 2022-03-07 NOTE — REVIEW OF SYSTEMS
[Negative] : Allergic/Immunologic [FreeTextEntry7] : diarrhea has subsided. [FreeTextEntry9] : weakness and pain in back due to prior surgeries. [de-identified] : ankle swelling

## 2022-03-07 NOTE — PHYSICAL EXAM
[Restricted in physically strenuous activity but ambulatory and able to carry out work of a light or sedentary nature] : Status 1- Restricted in physically strenuous activity but ambulatory and able to carry out work of a light or sedentary nature, e.g., light house work, office work [Obese] : obese [Normal] : affect appropriate [de-identified] : uses cane for balance and weakness [de-identified] : obese, left anterior incision for prior back surgery. Liver is not palpable. [de-identified] : 2+ bilateral ankle edema [de-identified] : thoracic and lumbar spine incision from 4 prior back surgeries with hardware [de-identified] : bilateral anterior knee incisions from prior knee replacements

## 2022-03-08 LAB
ALBUMIN SERPL ELPH-MCNC: 4.8 G/DL
ALP BLD-CCNC: 64 U/L
ALT SERPL-CCNC: 37 U/L
ANION GAP SERPL CALC-SCNC: 15 MMOL/L
AST SERPL-CCNC: 25 U/L
BILIRUB SERPL-MCNC: 0.4 MG/DL
BUN SERPL-MCNC: 32 MG/DL
CALCIUM SERPL-MCNC: 9.3 MG/DL
CHLORIDE SERPL-SCNC: 104 MMOL/L
CO2 SERPL-SCNC: 26 MMOL/L
CREAT SERPL-MCNC: 1.3 MG/DL
EGFR: 57 ML/MIN/1.73M2
GLUCOSE SERPL-MCNC: 129 MG/DL
POTASSIUM SERPL-SCNC: 3.9 MMOL/L
PROT SERPL-MCNC: 6.9 G/DL
SODIUM SERPL-SCNC: 145 MMOL/L

## 2022-03-11 LAB — PANC POLYPEPT SERPL-MCNC: 1267 PG/ML

## 2022-03-15 ENCOUNTER — RESULT REVIEW (OUTPATIENT)
Age: 77
End: 2022-03-15

## 2022-03-25 ENCOUNTER — APPOINTMENT (OUTPATIENT)
Dept: VASCULAR SURGERY | Facility: CLINIC | Age: 77
End: 2022-03-25
Payer: MEDICARE

## 2022-03-28 ENCOUNTER — APPOINTMENT (OUTPATIENT)
Dept: CT IMAGING | Facility: IMAGING CENTER | Age: 77
End: 2022-03-28
Payer: MEDICARE

## 2022-03-28 ENCOUNTER — OUTPATIENT (OUTPATIENT)
Dept: OUTPATIENT SERVICES | Facility: HOSPITAL | Age: 77
LOS: 1 days | End: 2022-03-28
Payer: MEDICARE

## 2022-03-28 DIAGNOSIS — Z98.890 OTHER SPECIFIED POSTPROCEDURAL STATES: Chronic | ICD-10-CM

## 2022-03-28 DIAGNOSIS — C7A.8 OTHER MALIGNANT NEUROENDOCRINE TUMORS: ICD-10-CM

## 2022-03-28 DIAGNOSIS — Z96.653 PRESENCE OF ARTIFICIAL KNEE JOINT, BILATERAL: Chronic | ICD-10-CM

## 2022-03-28 DIAGNOSIS — Z96.60 PRESENCE OF UNSPECIFIED ORTHOPEDIC JOINT IMPLANT: Chronic | ICD-10-CM

## 2022-03-28 PROCEDURE — 71260 CT THORAX DX C+: CPT | Mod: 26,MG

## 2022-03-28 PROCEDURE — G1004: CPT

## 2022-03-28 PROCEDURE — 74177 CT ABD & PELVIS W/CONTRAST: CPT | Mod: MG

## 2022-03-28 PROCEDURE — 74177 CT ABD & PELVIS W/CONTRAST: CPT | Mod: 26,MG

## 2022-03-28 PROCEDURE — 71260 CT THORAX DX C+: CPT | Mod: MG

## 2022-04-01 ENCOUNTER — APPOINTMENT (OUTPATIENT)
Dept: VASCULAR SURGERY | Facility: CLINIC | Age: 77
End: 2022-04-01
Payer: MEDICARE

## 2022-04-01 VITALS
BODY MASS INDEX: 41.52 KG/M2 | TEMPERATURE: 96 F | HEIGHT: 70 IN | SYSTOLIC BLOOD PRESSURE: 126 MMHG | DIASTOLIC BLOOD PRESSURE: 79 MMHG | WEIGHT: 290 LBS | HEART RATE: 69 BPM

## 2022-04-01 VITALS — HEART RATE: 67 BPM | SYSTOLIC BLOOD PRESSURE: 129 MMHG | DIASTOLIC BLOOD PRESSURE: 77 MMHG

## 2022-04-01 DIAGNOSIS — M79.674 PAIN IN RIGHT TOE(S): ICD-10-CM

## 2022-04-01 PROCEDURE — 99212 OFFICE O/P EST SF 10 MIN: CPT

## 2022-04-02 ENCOUNTER — OUTPATIENT (OUTPATIENT)
Dept: OUTPATIENT SERVICES | Facility: HOSPITAL | Age: 77
LOS: 1 days | Discharge: HOME | End: 2022-04-02

## 2022-04-02 DIAGNOSIS — Z96.653 PRESENCE OF ARTIFICIAL KNEE JOINT, BILATERAL: Chronic | ICD-10-CM

## 2022-04-02 DIAGNOSIS — Z98.890 OTHER SPECIFIED POSTPROCEDURAL STATES: Chronic | ICD-10-CM

## 2022-04-02 DIAGNOSIS — C7A.8 OTHER MALIGNANT NEUROENDOCRINE TUMORS: ICD-10-CM

## 2022-04-02 DIAGNOSIS — Z96.60 PRESENCE OF UNSPECIFIED ORTHOPEDIC JOINT IMPLANT: Chronic | ICD-10-CM

## 2022-04-02 NOTE — PHYSICAL EXAM
[Calm] : calm [de-identified] : NAD [FreeTextEntry1] : Legs warm with brisk capillary refill\par Bilateral ankle and calf swelling with gaiter hyperpigmentation\par RFA access sites well healed\par Dry skin, attenuated appearing in the shin areas\par R shin with subcentimeter superficial skin tear, pink base, no s/s of infection [de-identified] : unlabored

## 2022-04-02 NOTE — HISTORY OF PRESENT ILLNESS
[FreeTextEntry1] : NANDO Fletcher is a 76 year old male who presents today for post-op evaluation. He has a history of venous insufficiency and is s/p bilateral GSV RFA. Post-procedure ultrasounds demonstrated successful ablation of bilateral GSV and no evidence of DVT. \par \par Today the patient reports minimally improved symptoms in the legs. States that the swelling seems unchanged but overall discomfort is somewhat better. He is on diuretics. He does not elevate his legs. He has tried compression stockings in the past and states that he cannot tolerate them. \par \par PMH: HTN, HLD, metastatic neuroendocrine tumor on chemotherapy, former smoker\par \par Pre-Op VLE reviewed. No DVT/SVT or deep reflux.

## 2022-04-02 NOTE — ASSESSMENT
[FreeTextEntry1] : A/P:\par \par Patient with venous insufficiency now s/p bilateral GSV RFA. Post-procedure ultrasound demonstrated successful closure of the veins and were negative for DVT. No evidence of DVT on Pre-Op VLE from December 2021. \par \par Recommend conservative measures including leg elevation and compression therapy. He is also on diuretics per cardio. We discussed compression stockings versus lymphedema therapy ([facility], [facility]). Patient stated that he is not interested in compression therapy because it is "not a cure." I spoke with the patient at length about the nature of chronic swelling and the need for daily compression. Although these are not curative measures, daily compression is necessary to ameliorate swelling and leg discomfort and more importantly prevent further skin damage. After a thorough discussion, patient declined referral for lymphedema/compression therapy. I advised him to elevate his leg whenever possible and moisturize skin liberally. \par \par If continued and/or worsening swelling, patient should follow up for repeat venous reflux study. Otherwise, follow up as needed.

## 2022-04-07 ENCOUNTER — APPOINTMENT (OUTPATIENT)
Dept: INFUSION THERAPY | Facility: HOSPITAL | Age: 77
End: 2022-04-07

## 2022-05-02 ENCOUNTER — OUTPATIENT (OUTPATIENT)
Dept: OUTPATIENT SERVICES | Facility: HOSPITAL | Age: 77
LOS: 1 days | Discharge: HOME | End: 2022-05-02

## 2022-05-02 DIAGNOSIS — Z98.890 OTHER SPECIFIED POSTPROCEDURAL STATES: Chronic | ICD-10-CM

## 2022-05-02 DIAGNOSIS — Z96.60 PRESENCE OF UNSPECIFIED ORTHOPEDIC JOINT IMPLANT: Chronic | ICD-10-CM

## 2022-05-02 DIAGNOSIS — C7A.8 OTHER MALIGNANT NEUROENDOCRINE TUMORS: ICD-10-CM

## 2022-05-02 DIAGNOSIS — Z96.653 PRESENCE OF ARTIFICIAL KNEE JOINT, BILATERAL: Chronic | ICD-10-CM

## 2022-05-04 ENCOUNTER — APPOINTMENT (OUTPATIENT)
Dept: INFUSION THERAPY | Facility: HOSPITAL | Age: 77
End: 2022-05-04

## 2022-05-08 ENCOUNTER — RX RENEWAL (OUTPATIENT)
Age: 77
End: 2022-05-08

## 2022-05-17 ENCOUNTER — APPOINTMENT (OUTPATIENT)
Dept: CARDIOLOGY | Facility: CLINIC | Age: 77
End: 2022-05-17
Payer: MEDICARE

## 2022-05-17 ENCOUNTER — NON-APPOINTMENT (OUTPATIENT)
Age: 77
End: 2022-05-17

## 2022-05-17 VITALS
DIASTOLIC BLOOD PRESSURE: 80 MMHG | TEMPERATURE: 97.8 F | HEIGHT: 70 IN | SYSTOLIC BLOOD PRESSURE: 118 MMHG | HEART RATE: 65 BPM | OXYGEN SATURATION: 98 % | BODY MASS INDEX: 40.66 KG/M2 | WEIGHT: 284 LBS

## 2022-05-17 PROCEDURE — 99214 OFFICE O/P EST MOD 30 MIN: CPT

## 2022-05-17 PROCEDURE — 93000 ELECTROCARDIOGRAM COMPLETE: CPT

## 2022-05-17 RX ORDER — FLUCONAZOLE 100 MG/1
100 TABLET ORAL DAILY
Qty: 3 | Refills: 1 | Status: DISCONTINUED | COMMUNITY
Start: 2021-09-17 | End: 2022-05-17

## 2022-05-17 RX ORDER — METHYLPREDNISOLONE 4 MG/1
4 TABLET ORAL
Qty: 1 | Refills: 0 | Status: DISCONTINUED | COMMUNITY
Start: 2021-10-14 | End: 2022-05-17

## 2022-05-17 NOTE — HISTORY OF PRESENT ILLNESS
[FreeTextEntry1] : Holly is a 77yo male with a PMhx of Neuroendocrine Ca, PAD, CKD, HLD, HTN, who presents for follow up. Patient states that he is doing well, continues to have dyspnea on exertion but is stable at this time. Patient denies chest pain, palpitations, dizziness, vision changes, n/v, abdominal pain, changes in bowel/bladder habits,  or appetite.

## 2022-05-24 ENCOUNTER — APPOINTMENT (OUTPATIENT)
Dept: CARDIOLOGY | Facility: CLINIC | Age: 77
End: 2022-05-24
Payer: MEDICARE

## 2022-05-24 PROCEDURE — 93306 TTE W/DOPPLER COMPLETE: CPT

## 2022-05-31 ENCOUNTER — NON-APPOINTMENT (OUTPATIENT)
Age: 77
End: 2022-05-31

## 2022-06-01 ENCOUNTER — APPOINTMENT (OUTPATIENT)
Dept: INFUSION THERAPY | Facility: HOSPITAL | Age: 77
End: 2022-06-01

## 2022-06-01 ENCOUNTER — APPOINTMENT (OUTPATIENT)
Dept: CARDIOLOGY | Facility: CLINIC | Age: 77
End: 2022-06-01
Payer: MEDICARE

## 2022-06-01 VITALS
TEMPERATURE: 98.4 F | SYSTOLIC BLOOD PRESSURE: 132 MMHG | DIASTOLIC BLOOD PRESSURE: 60 MMHG | HEART RATE: 100 BPM | HEIGHT: 70 IN | OXYGEN SATURATION: 97 % | WEIGHT: 290 LBS | BODY MASS INDEX: 41.52 KG/M2

## 2022-06-01 PROCEDURE — 99214 OFFICE O/P EST MOD 30 MIN: CPT

## 2022-06-01 NOTE — HISTORY OF PRESENT ILLNESS
[FreeTextEntry1] : This is a 77yo male with a PMhx of Neuroendocrine Ca, PAD, CKD, HLD, HTN, who presents to the office for on ing LE edema with pain to the ankles \par \par pt reports on ing LE edema with some pain to the ankles. \par Pt denies any chest pain or SOB

## 2022-06-01 NOTE — PHYSICAL EXAM
[Well Developed] : well developed [Well Nourished] : well nourished [No Acute Distress] : no acute distress [Normal Conjunctiva] : normal conjunctiva [Normal Venous Pressure] : normal venous pressure [No Carotid Bruit] : no carotid bruit [Normal S1, S2] : normal S1, S2 [No Murmur] : no murmur [No Rub] : no rub [No Gallop] : no gallop [Clear Lung Fields] : clear lung fields [Good Air Entry] : good air entry [No Respiratory Distress] : no respiratory distress  [Soft] : abdomen soft [Non Tender] : non-tender [No Masses/organomegaly] : no masses/organomegaly [Normal Bowel Sounds] : normal bowel sounds [Normal Gait] : normal gait [No Edema] : no edema [No Cyanosis] : no cyanosis [No Clubbing] : no clubbing [No Varicosities] : no varicosities [No Rash] : no rash [No Skin Lesions] : no skin lesions [Moves all extremities] : moves all extremities [No Focal Deficits] : no focal deficits [Normal Speech] : normal speech [Alert and Oriented] : alert and oriented [Normal memory] : normal memory [de-identified] : 2+ Le edema with signs of venois stasis dermatitis  open blister seen to lower anterior shin of thr right leg  and pain bilateral ankles

## 2022-06-06 ENCOUNTER — RX RENEWAL (OUTPATIENT)
Age: 77
End: 2022-06-06

## 2022-06-21 ENCOUNTER — OUTPATIENT (OUTPATIENT)
Dept: OUTPATIENT SERVICES | Facility: HOSPITAL | Age: 77
LOS: 1 days | Discharge: HOME | End: 2022-06-21

## 2022-06-21 DIAGNOSIS — Z98.890 OTHER SPECIFIED POSTPROCEDURAL STATES: Chronic | ICD-10-CM

## 2022-06-21 DIAGNOSIS — C7A.8 OTHER MALIGNANT NEUROENDOCRINE TUMORS: ICD-10-CM

## 2022-06-21 DIAGNOSIS — Z96.60 PRESENCE OF UNSPECIFIED ORTHOPEDIC JOINT IMPLANT: Chronic | ICD-10-CM

## 2022-06-21 DIAGNOSIS — Z96.653 PRESENCE OF ARTIFICIAL KNEE JOINT, BILATERAL: Chronic | ICD-10-CM

## 2022-07-01 ENCOUNTER — APPOINTMENT (OUTPATIENT)
Dept: INFUSION THERAPY | Facility: HOSPITAL | Age: 77
End: 2022-07-01

## 2022-07-01 ENCOUNTER — APPOINTMENT (OUTPATIENT)
Dept: CARDIOLOGY | Facility: CLINIC | Age: 77
End: 2022-07-01

## 2022-07-01 VITALS
OXYGEN SATURATION: 98 % | BODY MASS INDEX: 42.23 KG/M2 | TEMPERATURE: 98.2 F | WEIGHT: 295 LBS | HEIGHT: 70 IN | HEART RATE: 97 BPM | DIASTOLIC BLOOD PRESSURE: 80 MMHG | SYSTOLIC BLOOD PRESSURE: 130 MMHG

## 2022-07-01 PROCEDURE — 99214 OFFICE O/P EST MOD 30 MIN: CPT

## 2022-07-01 RX ORDER — COLCHICINE 0.6 MG/1
0.6 TABLET ORAL
Qty: 9 | Refills: 0 | Status: DISCONTINUED | COMMUNITY
Start: 2022-06-01 | End: 2022-07-01

## 2022-07-01 RX ORDER — METHYLPREDNISOLONE 4 MG/1
4 TABLET ORAL
Qty: 1 | Refills: 0 | Status: DISCONTINUED | COMMUNITY
Start: 2022-06-01 | End: 2022-07-01

## 2022-07-01 RX ORDER — ZOSTER VACCINE RECOMBINANT, ADJUVANTED 50 MCG/0.5
50 KIT INTRAMUSCULAR
Qty: 2 | Refills: 0 | Status: DISCONTINUED | COMMUNITY
Start: 2021-05-26 | End: 2022-07-01

## 2022-07-01 NOTE — HISTORY OF PRESENT ILLNESS
[FreeTextEntry1] : This is a 77yo male with a PMhx of Neuroendocrine Ca, PAD, CKD, HLD, HTN, who presents to the office for follow-up.  Patient recently seen in office earlier this month for BLLE edema, Torsemide was increased to 40mg in the a.m and 20mg qHS with Potassium 40meq a.m and 20meq qHS. Patient also with elevated uric acid at this time and prescribed medrol dose pack and Colchicine. Stil with edema and discoloration of B/L LE. Pt denies pain or cramping in the legs.

## 2022-07-01 NOTE — PHYSICAL EXAM

## 2022-08-01 ENCOUNTER — APPOINTMENT (OUTPATIENT)
Dept: INFUSION THERAPY | Facility: HOSPITAL | Age: 77
End: 2022-08-01

## 2022-08-02 DIAGNOSIS — D3A.8 OTHER BENIGN NEUROENDOCRINE TUMORS: ICD-10-CM

## 2022-08-07 ENCOUNTER — RX RENEWAL (OUTPATIENT)
Age: 77
End: 2022-08-07

## 2022-08-17 ENCOUNTER — NON-APPOINTMENT (OUTPATIENT)
Age: 77
End: 2022-08-17

## 2022-08-17 DIAGNOSIS — U07.1 COVID-19: ICD-10-CM

## 2022-08-29 ENCOUNTER — OUTPATIENT (OUTPATIENT)
Dept: OUTPATIENT SERVICES | Facility: HOSPITAL | Age: 77
LOS: 1 days | Discharge: HOME | End: 2022-08-29

## 2022-08-29 DIAGNOSIS — Z98.890 OTHER SPECIFIED POSTPROCEDURAL STATES: Chronic | ICD-10-CM

## 2022-08-29 DIAGNOSIS — Z96.653 PRESENCE OF ARTIFICIAL KNEE JOINT, BILATERAL: Chronic | ICD-10-CM

## 2022-08-29 DIAGNOSIS — Z96.60 PRESENCE OF UNSPECIFIED ORTHOPEDIC JOINT IMPLANT: Chronic | ICD-10-CM

## 2022-08-29 DIAGNOSIS — C7A.8 OTHER MALIGNANT NEUROENDOCRINE TUMORS: ICD-10-CM

## 2022-09-01 ENCOUNTER — APPOINTMENT (OUTPATIENT)
Dept: CARDIOLOGY | Facility: CLINIC | Age: 77
End: 2022-09-01

## 2022-09-01 ENCOUNTER — APPOINTMENT (OUTPATIENT)
Dept: INFUSION THERAPY | Facility: HOSPITAL | Age: 77
End: 2022-09-01

## 2022-09-01 VITALS
BODY MASS INDEX: 41.37 KG/M2 | SYSTOLIC BLOOD PRESSURE: 112 MMHG | HEIGHT: 70 IN | WEIGHT: 289 LBS | DIASTOLIC BLOOD PRESSURE: 62 MMHG | TEMPERATURE: 98.5 F | OXYGEN SATURATION: 97 % | HEART RATE: 78 BPM

## 2022-09-01 DIAGNOSIS — H00.019 HORDEOLUM EXTERNUM UNSPECIFIED EYE, UNSPECIFIED EYELID: ICD-10-CM

## 2022-09-01 PROCEDURE — 99213 OFFICE O/P EST LOW 20 MIN: CPT

## 2022-09-01 NOTE — HISTORY OF PRESENT ILLNESS
[FreeTextEntry1] : This is a 76 y/o male with a pmhx of PAD, CKD, HLD, HTN, who presents to the office for follow-up for edema. Pt was last seen on 7/1/22 with continued edema of B/L feet and torsemide was increase to 40 mg BID. Pt still with some edema, but improved, however continues with discoloration. Otherwise Pt feels well and has no complaints.

## 2022-09-28 ENCOUNTER — APPOINTMENT (OUTPATIENT)
Dept: INFUSION THERAPY | Facility: HOSPITAL | Age: 77
End: 2022-09-28

## 2022-09-29 DIAGNOSIS — D3A.8 OTHER BENIGN NEUROENDOCRINE TUMORS: ICD-10-CM

## 2022-10-28 ENCOUNTER — APPOINTMENT (OUTPATIENT)
Dept: INFUSION THERAPY | Facility: HOSPITAL | Age: 77
End: 2022-10-28

## 2022-11-22 ENCOUNTER — OUTPATIENT (OUTPATIENT)
Dept: OUTPATIENT SERVICES | Facility: HOSPITAL | Age: 77
LOS: 1 days | Discharge: HOME | End: 2022-11-22

## 2022-11-22 DIAGNOSIS — Z98.890 OTHER SPECIFIED POSTPROCEDURAL STATES: Chronic | ICD-10-CM

## 2022-11-22 DIAGNOSIS — Z96.653 PRESENCE OF ARTIFICIAL KNEE JOINT, BILATERAL: Chronic | ICD-10-CM

## 2022-11-22 DIAGNOSIS — Z96.60 PRESENCE OF UNSPECIFIED ORTHOPEDIC JOINT IMPLANT: Chronic | ICD-10-CM

## 2022-11-22 DIAGNOSIS — C7A.8 OTHER MALIGNANT NEUROENDOCRINE TUMORS: ICD-10-CM

## 2022-11-28 ENCOUNTER — APPOINTMENT (OUTPATIENT)
Dept: INFUSION THERAPY | Facility: HOSPITAL | Age: 77
End: 2022-11-28

## 2022-12-28 ENCOUNTER — APPOINTMENT (OUTPATIENT)
Dept: INFUSION THERAPY | Facility: HOSPITAL | Age: 77
End: 2022-12-28

## 2023-01-04 ENCOUNTER — APPOINTMENT (OUTPATIENT)
Dept: INTERNAL MEDICINE | Facility: CLINIC | Age: 78
End: 2023-01-04
Payer: MEDICARE

## 2023-01-04 ENCOUNTER — APPOINTMENT (OUTPATIENT)
Dept: CARDIOLOGY | Facility: CLINIC | Age: 78
End: 2023-01-04
Payer: MEDICARE

## 2023-01-04 VITALS
SYSTOLIC BLOOD PRESSURE: 100 MMHG | BODY MASS INDEX: 41.52 KG/M2 | HEIGHT: 70 IN | DIASTOLIC BLOOD PRESSURE: 60 MMHG | TEMPERATURE: 98.1 F | WEIGHT: 290 LBS | OXYGEN SATURATION: 96 % | HEART RATE: 87 BPM

## 2023-01-04 VITALS — DIASTOLIC BLOOD PRESSURE: 70 MMHG | SYSTOLIC BLOOD PRESSURE: 118 MMHG

## 2023-01-04 DIAGNOSIS — M79.89 OTHER SPECIFIED SOFT TISSUE DISORDERS: ICD-10-CM

## 2023-01-04 LAB
ALBUMIN SERPL ELPH-MCNC: 4.3 G/DL
ALP BLD-CCNC: 80 U/L
ALT SERPL-CCNC: 19 U/L
ANION GAP SERPL CALC-SCNC: 15 MMOL/L
AST SERPL-CCNC: 26 U/L
BASOPHILS # BLD AUTO: 0.06 K/UL
BASOPHILS NFR BLD AUTO: 0.7 %
BILIRUB SERPL-MCNC: 0.6 MG/DL
BUN SERPL-MCNC: 35 MG/DL
CALCIUM SERPL-MCNC: 9.9 MG/DL
CHLORIDE SERPL-SCNC: 99 MMOL/L
CO2 SERPL-SCNC: 25 MMOL/L
CREAT SERPL-MCNC: 1.5 MG/DL
CRP SERPL-MCNC: 48 MG/L
EGFR: 48 ML/MIN/1.73M2
EOSINOPHIL # BLD AUTO: 0.28 K/UL
EOSINOPHIL NFR BLD AUTO: 3.3 %
ERYTHROCYTE [SEDIMENTATION RATE] IN BLOOD BY WESTERGREN METHOD: 31 MM/HR
GLUCOSE SERPL-MCNC: 140 MG/DL
HCT VFR BLD CALC: 42.4 %
HGB BLD-MCNC: 14 G/DL
IMM GRANULOCYTES NFR BLD AUTO: 0.4 %
LYMPHOCYTES # BLD AUTO: 1.36 K/UL
LYMPHOCYTES NFR BLD AUTO: 16 %
MAGNESIUM SERPL-MCNC: 2 MG/DL
MAN DIFF?: NORMAL
MCHC RBC-ENTMCNC: 31.3 PG
MCHC RBC-ENTMCNC: 33 GM/DL
MCV RBC AUTO: 94.9 FL
MONOCYTES # BLD AUTO: 0.92 K/UL
MONOCYTES NFR BLD AUTO: 10.8 %
NEUTROPHILS # BLD AUTO: 5.87 K/UL
NEUTROPHILS NFR BLD AUTO: 68.8 %
NT-PROBNP SERPL-MCNC: 192 PG/ML
PLATELET # BLD AUTO: 215 K/UL
POTASSIUM SERPL-SCNC: 3.9 MMOL/L
PROCALCITONIN SERPL-MCNC: 0.3 NG/ML
PROT SERPL-MCNC: 7.5 G/DL
RBC # BLD: 4.47 M/UL
RBC # FLD: 13.9 %
SODIUM SERPL-SCNC: 139 MMOL/L
WBC # FLD AUTO: 8.52 K/UL

## 2023-01-04 PROCEDURE — 99214 OFFICE O/P EST MOD 30 MIN: CPT

## 2023-01-04 PROCEDURE — 93970 EXTREMITY STUDY: CPT

## 2023-01-04 PROCEDURE — G0444 DEPRESSION SCREEN ANNUAL: CPT

## 2023-01-04 RX ORDER — NIRMATRELVIR AND RITONAVIR 150-100 MG
10 X 150 MG & KIT ORAL
Qty: 5 | Refills: 0 | Status: DISCONTINUED | COMMUNITY
Start: 2022-08-17 | End: 2023-01-04

## 2023-01-04 RX ORDER — OCTREOTIDE ACETATE 100 UG/ML
100 INJECTION, SOLUTION INTRAVENOUS; SUBCUTANEOUS
Qty: 2 | Refills: 1 | Status: DISCONTINUED | COMMUNITY
Start: 2021-10-27 | End: 2023-01-04

## 2023-01-09 LAB — BACTERIA BLD CULT: NORMAL

## 2023-01-11 ENCOUNTER — APPOINTMENT (OUTPATIENT)
Dept: CARDIOLOGY | Facility: CLINIC | Age: 78
End: 2023-01-11

## 2023-01-11 ENCOUNTER — APPOINTMENT (OUTPATIENT)
Dept: INTERNAL MEDICINE | Facility: CLINIC | Age: 78
End: 2023-01-11
Payer: MEDICARE

## 2023-01-11 ENCOUNTER — NON-APPOINTMENT (OUTPATIENT)
Age: 78
End: 2023-01-11

## 2023-01-11 VITALS
DIASTOLIC BLOOD PRESSURE: 60 MMHG | HEART RATE: 80 BPM | OXYGEN SATURATION: 98 % | SYSTOLIC BLOOD PRESSURE: 100 MMHG | RESPIRATION RATE: 14 BRPM

## 2023-01-11 DIAGNOSIS — M79.89 OTHER SPECIFIED SOFT TISSUE DISORDERS: ICD-10-CM

## 2023-01-11 LAB
ANION GAP SERPL CALC-SCNC: 13 MMOL/L
BUN SERPL-MCNC: 48 MG/DL
CALCIUM SERPL-MCNC: 10.1 MG/DL
CHLORIDE SERPL-SCNC: 96 MMOL/L
CO2 SERPL-SCNC: 28 MMOL/L
CREAT SERPL-MCNC: 1.52 MG/DL
EGFR: 47 ML/MIN/1.73M2
GLUCOSE SERPL-MCNC: 149 MG/DL
MAGNESIUM SERPL-MCNC: 1.9 MG/DL
POTASSIUM SERPL-SCNC: 4.4 MMOL/L
SODIUM SERPL-SCNC: 137 MMOL/L

## 2023-01-11 PROCEDURE — 99214 OFFICE O/P EST MOD 30 MIN: CPT

## 2023-01-11 RX ORDER — CEPHALEXIN 500 MG/1
500 TABLET ORAL 4 TIMES DAILY
Qty: 8 | Refills: 0 | Status: DISCONTINUED | COMMUNITY
Start: 2023-01-04 | End: 2023-01-11

## 2023-01-13 ENCOUNTER — APPOINTMENT (OUTPATIENT)
Dept: RADIOLOGY | Facility: IMAGING CENTER | Age: 78
End: 2023-01-13

## 2023-01-16 PROBLEM — M79.89 SWELLING OF RIGHT LOWER EXTREMITY: Status: ACTIVE | Noted: 2023-01-04

## 2023-01-16 PROBLEM — M79.89 SWELLING OF LEFT LOWER EXTREMITY: Status: ACTIVE | Noted: 2023-01-04

## 2023-01-16 NOTE — HISTORY OF PRESENT ILLNESS
[FreeTextEntry1] : followup regarding LE cellulitis and gout [de-identified] : NANDO HERNANDEZ is a 77 year old male with PMH of  PAD, CKD, HLD, HTN, gout presented to the office today for followup after being started on medrol dose pack and antibiotic for RLE cellulitis and gout last week. Pt says hes doing a lot better, less redness. Denies pain  or swelling. Also taking his diuretic annd kcl twice a week so less swelling of legs.\par \par Reports new R shoulder pain when touches it or moves it. Denies falls.\par  Denies chest pain, sob, christine, dizziness, diaphoresis, palpitations, LE swelling, orthopnea, syncope, n/v, headache.\par

## 2023-01-16 NOTE — HEALTH RISK ASSESSMENT
[0] : 2) Feeling down, depressed, or hopeless: Not at all (0) [PHQ-2 Negative - No further assessment needed] : PHQ-2 Negative - No further assessment needed [[id number]] : 0

## 2023-01-16 NOTE — PHYSICAL EXAM
[de-identified] : improved pitting edema b/ l LE, now trace [de-identified] : R shoulder tenderness, no swelling, redness, cannnot raise more then 110 degrees [de-identified] : resolved R erythema, pain, swelling,

## 2023-01-16 NOTE — PHYSICAL EXAM
[No Acute Distress] : no acute distress [Well Nourished] : well nourished [Well Developed] : well developed [Well-Appearing] : well-appearing [Normal Sclera/Conjunctiva] : normal sclera/conjunctiva [PERRL] : pupils equal round and reactive to light [Normal Outer Ear/Nose] : the outer ears and nose were normal in appearance [Normal Oropharynx] : the oropharynx was normal [No JVD] : no jugular venous distention [No Lymphadenopathy] : no lymphadenopathy [Supple] : supple [Thyroid Normal, No Nodules] : the thyroid was normal and there were no nodules present [No Respiratory Distress] : no respiratory distress  [No Accessory Muscle Use] : no accessory muscle use [Clear to Auscultation] : lungs were clear to auscultation bilaterally [Normal Rate] : normal rate  [Regular Rhythm] : with a regular rhythm [Normal S1, S2] : normal S1 and S2 [No Murmur] : no murmur heard [No Carotid Bruits] : no carotid bruits [No Varicosities] : no varicosities [Pedal Pulses Present] : the pedal pulses are present [No Extremity Clubbing/Cyanosis] : no extremity clubbing/cyanosis [Soft] : abdomen soft [Non Tender] : non-tender [Non-distended] : non-distended [Normal Bowel Sounds] : normal bowel sounds [No CVA Tenderness] : no CVA  tenderness [No Spinal Tenderness] : no spinal tenderness [No Joint Swelling] : no joint swelling [Grossly Normal Strength/Tone] : grossly normal strength/tone [Coordination Grossly Intact] : coordination grossly intact [No Focal Deficits] : no focal deficits [Normal Gait] : normal gait [Normal Affect] : the affect was normal [Alert and Oriented x3] : oriented to person, place, and time [de-identified] : b/l 1+ pitting edema b/ l LE [de-identified] : R foot erythema, pain, swelling,

## 2023-01-16 NOTE — HISTORY OF PRESENT ILLNESS
[FreeTextEntry1] : r foot swelling [de-identified] : R foot swelling swelling, redness, pain, \par NANDO HERNANDEZ is a 77 year old male with PMH of  PAD, CKD, HLD, HTN presented to the office today for R foot swelling, redness, pain for the past  1 week or so. He was itching it before. no f/c\par went to Urgent care saturday, was given keflex 500mg TID,  but minimal improvement thus far. Think he has a gout attack as well.\par Denies chest pain, sob, christine, dizziness, diaphoresis, palpitations, LE swelling, orthopnea, syncope, n/v, headache.\par

## 2023-01-20 ENCOUNTER — OUTPATIENT (OUTPATIENT)
Dept: OUTPATIENT SERVICES | Facility: HOSPITAL | Age: 78
LOS: 1 days | Discharge: HOME | End: 2023-01-20

## 2023-01-20 DIAGNOSIS — Z98.890 OTHER SPECIFIED POSTPROCEDURAL STATES: Chronic | ICD-10-CM

## 2023-01-20 DIAGNOSIS — C7A.8 OTHER MALIGNANT NEUROENDOCRINE TUMORS: ICD-10-CM

## 2023-01-20 DIAGNOSIS — Z96.653 PRESENCE OF ARTIFICIAL KNEE JOINT, BILATERAL: Chronic | ICD-10-CM

## 2023-01-20 DIAGNOSIS — Z96.60 PRESENCE OF UNSPECIFIED ORTHOPEDIC JOINT IMPLANT: Chronic | ICD-10-CM

## 2023-01-26 ENCOUNTER — NON-APPOINTMENT (OUTPATIENT)
Age: 78
End: 2023-01-26

## 2023-01-26 ENCOUNTER — APPOINTMENT (OUTPATIENT)
Dept: CARDIOLOGY | Facility: CLINIC | Age: 78
End: 2023-01-26
Payer: MEDICARE

## 2023-01-26 ENCOUNTER — APPOINTMENT (OUTPATIENT)
Dept: INFUSION THERAPY | Facility: HOSPITAL | Age: 78
End: 2023-01-26

## 2023-01-26 VITALS
SYSTOLIC BLOOD PRESSURE: 103 MMHG | DIASTOLIC BLOOD PRESSURE: 70 MMHG | HEART RATE: 84 BPM | WEIGHT: 286 LBS | TEMPERATURE: 98.3 F | BODY MASS INDEX: 40.94 KG/M2 | HEIGHT: 70 IN | OXYGEN SATURATION: 97 %

## 2023-01-26 DIAGNOSIS — M25.511 PAIN IN RIGHT SHOULDER: ICD-10-CM

## 2023-01-26 DIAGNOSIS — M54.2 CERVICALGIA: ICD-10-CM

## 2023-01-26 PROCEDURE — 99214 OFFICE O/P EST MOD 30 MIN: CPT

## 2023-01-26 PROCEDURE — 93000 ELECTROCARDIOGRAM COMPLETE: CPT

## 2023-01-26 NOTE — REVIEW OF SYSTEMS
[Negative] : Heme/Lymph [Headache] : headache [Lower Ext Edema] : lower extremity edema [Fever] : no fever [Weight Gain (___ Lbs)] : no recent weight gain [Chills] : no chills [Feeling Fatigued] : not feeling fatigued [Weight Loss (___ Lbs)] : no recent weight loss [SOB] : no shortness of breath [Dyspnea on exertion] : not dyspnea during exertion [Chest Discomfort] : no chest discomfort [Leg Claudication] : no intermittent leg claudication [Palpitations] : no palpitations [Orthopnea] : no orthopnea [PND] : no PND [Syncope] : no syncope [FreeTextEntry9] : neck pain

## 2023-01-26 NOTE — HISTORY OF PRESENT ILLNESS
[FreeTextEntry1] : This is a 78 y/o male with a pmhx of PAD, CKD, HLD, HTN, who presents to the office for follow-up. Patient still with edema of B/L LE, worse of RLE. Erythema and wound on RLE are improving. Pt reports stiffness of B/L shoulder. He also reports headaches over left ear radiating to back of head.

## 2023-01-26 NOTE — PHYSICAL EXAM

## 2023-02-03 ENCOUNTER — NON-APPOINTMENT (OUTPATIENT)
Age: 78
End: 2023-02-03

## 2023-02-24 ENCOUNTER — APPOINTMENT (OUTPATIENT)
Dept: INFUSION THERAPY | Facility: HOSPITAL | Age: 78
End: 2023-02-24

## 2023-02-27 DIAGNOSIS — D3A.8 OTHER BENIGN NEUROENDOCRINE TUMORS: ICD-10-CM

## 2023-03-05 ENCOUNTER — RX RENEWAL (OUTPATIENT)
Age: 78
End: 2023-03-05

## 2023-03-18 ENCOUNTER — OUTPATIENT (OUTPATIENT)
Dept: OUTPATIENT SERVICES | Facility: HOSPITAL | Age: 78
LOS: 1 days | Discharge: HOME | End: 2023-03-18

## 2023-03-18 DIAGNOSIS — Z98.890 OTHER SPECIFIED POSTPROCEDURAL STATES: Chronic | ICD-10-CM

## 2023-03-18 DIAGNOSIS — Z96.653 PRESENCE OF ARTIFICIAL KNEE JOINT, BILATERAL: Chronic | ICD-10-CM

## 2023-03-18 DIAGNOSIS — Z96.60 PRESENCE OF UNSPECIFIED ORTHOPEDIC JOINT IMPLANT: Chronic | ICD-10-CM

## 2023-03-18 DIAGNOSIS — C7A.8 OTHER MALIGNANT NEUROENDOCRINE TUMORS: ICD-10-CM

## 2023-03-23 ENCOUNTER — APPOINTMENT (OUTPATIENT)
Dept: INTERNAL MEDICINE | Facility: CLINIC | Age: 78
End: 2023-03-23
Payer: MEDICARE

## 2023-03-23 ENCOUNTER — APPOINTMENT (OUTPATIENT)
Dept: INFUSION THERAPY | Facility: HOSPITAL | Age: 78
End: 2023-03-23

## 2023-03-23 ENCOUNTER — NON-APPOINTMENT (OUTPATIENT)
Age: 78
End: 2023-03-23

## 2023-03-23 VITALS
SYSTOLIC BLOOD PRESSURE: 110 MMHG | BODY MASS INDEX: 41.66 KG/M2 | DIASTOLIC BLOOD PRESSURE: 66 MMHG | HEART RATE: 91 BPM | OXYGEN SATURATION: 96 % | HEIGHT: 70 IN | WEIGHT: 291 LBS

## 2023-03-23 VITALS — TEMPERATURE: 99.4 F

## 2023-03-23 PROCEDURE — 93000 ELECTROCARDIOGRAM COMPLETE: CPT

## 2023-03-23 PROCEDURE — 99214 OFFICE O/P EST MOD 30 MIN: CPT | Mod: 25

## 2023-03-23 RX ORDER — DOXYCYCLINE 100 MG/1
100 CAPSULE ORAL TWICE DAILY
Qty: 20 | Refills: 0 | Status: DISCONTINUED | COMMUNITY
Start: 2023-01-04 | End: 2023-03-23

## 2023-03-23 RX ORDER — METHYLPREDNISOLONE 4 MG/1
4 TABLET ORAL
Qty: 1 | Refills: 0 | Status: DISCONTINUED | COMMUNITY
Start: 2023-01-04 | End: 2023-03-23

## 2023-03-23 RX ORDER — METHYLPREDNISOLONE 4 MG/1
4 TABLET ORAL
Qty: 1 | Refills: 0 | Status: DISCONTINUED | COMMUNITY
Start: 2023-01-26 | End: 2023-03-23

## 2023-03-23 NOTE — PHYSICAL EXAM
[No Acute Distress] : no acute distress [Well Nourished] : well nourished [Well Developed] : well developed [Well-Appearing] : well-appearing [Normal Sclera/Conjunctiva] : normal sclera/conjunctiva [Normal Outer Ear/Nose] : the outer ears and nose were normal in appearance [Normal Oropharynx] : the oropharynx was normal [No JVD] : no jugular venous distention [Supple] : supple [No Respiratory Distress] : no respiratory distress  [No Accessory Muscle Use] : no accessory muscle use [Clear to Auscultation] : lungs were clear to auscultation bilaterally [Normal Rate] : normal rate  [Regular Rhythm] : with a regular rhythm [Normal S1, S2] : normal S1 and S2 [No Murmur] : no murmur heard [No Carotid Bruits] : no carotid bruits [Pedal Pulses Present] : the pedal pulses are present [No Extremity Clubbing/Cyanosis] : no extremity clubbing/cyanosis [Soft] : abdomen soft [Non Tender] : non-tender [Non-distended] : non-distended [Normal Bowel Sounds] : normal bowel sounds [Normal Anterior Cervical Nodes] : no anterior cervical lymphadenopathy [No CVA Tenderness] : no CVA  tenderness [No Spinal Tenderness] : no spinal tenderness [Grossly Normal Strength/Tone] : grossly normal strength/tone [Coordination Grossly Intact] : coordination grossly intact [No Focal Deficits] : no focal deficits [Normal Gait] : normal gait [Normal Affect] : the affect was normal [Alert and Oriented x3] : oriented to person, place, and time [de-identified] : 2+ pitting edema b/l LE 2/3 way up tibia, R>L [de-identified] : mild tenderness and warmth over R big toe, no redness  [de-identified] : mild erythena, warmth and tenderness in R anterior tibia area.

## 2023-03-23 NOTE — REVIEW OF SYSTEMS
[Lower Ext Edema] : lower extremity edema [Negative] : Heme/Lymph [Dyspnea on Exertion] : dyspnea on exertion [Chest Pain] : no chest pain [Palpitations] : no palpitations [Shortness Of Breath] : no shortness of breath [Wheezing] : no wheezing [Cough] : no cough

## 2023-03-23 NOTE — HISTORY OF PRESENT ILLNESS
[FreeTextEntry8] : CC: Leg Swelling\par \par NANDO HERNANDEZ is a 77 year old male, non-smoker with PMH of PAD, CKD, HLD, HTN, gout presented to the office today for c/o leg swelling, b/l  R leg > L leg which he states is typical more swollen at baseline. The swelling got worse in the past 5 days. Pt did not take Lasix today because he had to go to course. Also had pain in R leg and maybe some redness. Pt is having increased pain in the right foot near big toe he states he also has gout, usually takes Medrol dose pack. Pt states he is having RAINES as well for past few days. Also reports chills but no fevers. \par Denies chest pain, sob, dizziness, diaphoresis, palpitations, orthopnea, syncope, n/v, headache.\par

## 2023-03-24 DIAGNOSIS — D3A.8 OTHER BENIGN NEUROENDOCRINE TUMORS: ICD-10-CM

## 2023-03-24 LAB
ALBUMIN SERPL ELPH-MCNC: 4 G/DL
ALP BLD-CCNC: 80 U/L
ALT SERPL-CCNC: 25 U/L
ANION GAP SERPL CALC-SCNC: 13 MMOL/L
AST SERPL-CCNC: 27 U/L
BASOPHILS # BLD AUTO: 0.03 K/UL
BASOPHILS NFR BLD AUTO: 0.3 %
BILIRUB SERPL-MCNC: 0.5 MG/DL
BUN SERPL-MCNC: 20 MG/DL
CALCIUM SERPL-MCNC: 9.5 MG/DL
CHLORIDE SERPL-SCNC: 101 MMOL/L
CO2 SERPL-SCNC: 25 MMOL/L
CREAT SERPL-MCNC: 1.13 MG/DL
EGFR: 67 ML/MIN/1.73M2
EOSINOPHIL # BLD AUTO: 0.16 K/UL
EOSINOPHIL NFR BLD AUTO: 1.6 %
GLUCOSE SERPL-MCNC: 135 MG/DL
HCT VFR BLD CALC: 41.7 %
HGB BLD-MCNC: 13.2 G/DL
IMM GRANULOCYTES NFR BLD AUTO: 0.3 %
LYMPHOCYTES # BLD AUTO: 1.25 K/UL
LYMPHOCYTES NFR BLD AUTO: 12.1 %
MAN DIFF?: NORMAL
MCHC RBC-ENTMCNC: 31.2 PG
MCHC RBC-ENTMCNC: 31.7 GM/DL
MCV RBC AUTO: 98.6 FL
MONOCYTES # BLD AUTO: 0.89 K/UL
MONOCYTES NFR BLD AUTO: 8.6 %
NEUTROPHILS # BLD AUTO: 7.94 K/UL
NEUTROPHILS NFR BLD AUTO: 77.1 %
NT-PROBNP SERPL-MCNC: 217 PG/ML
PLATELET # BLD AUTO: 162 K/UL
POTASSIUM SERPL-SCNC: 3.7 MMOL/L
PROT SERPL-MCNC: 6.9 G/DL
RBC # BLD: 4.23 M/UL
RBC # FLD: 15.1 %
SODIUM SERPL-SCNC: 139 MMOL/L
WBC # FLD AUTO: 10.3 K/UL

## 2023-03-29 ENCOUNTER — APPOINTMENT (OUTPATIENT)
Dept: INTERNAL MEDICINE | Facility: CLINIC | Age: 78
End: 2023-03-29
Payer: MEDICARE

## 2023-03-29 VITALS
SYSTOLIC BLOOD PRESSURE: 120 MMHG | DIASTOLIC BLOOD PRESSURE: 60 MMHG | WEIGHT: 286 LBS | OXYGEN SATURATION: 99 % | HEIGHT: 70 IN | TEMPERATURE: 97.6 F | HEART RATE: 78 BPM | BODY MASS INDEX: 40.94 KG/M2

## 2023-03-29 PROCEDURE — 99214 OFFICE O/P EST MOD 30 MIN: CPT

## 2023-03-30 LAB
ANION GAP SERPL CALC-SCNC: 14 MMOL/L
BASOPHILS # BLD AUTO: 0.04 K/UL
BASOPHILS NFR BLD AUTO: 0.4 %
BUN SERPL-MCNC: 36 MG/DL
CALCIUM SERPL-MCNC: 9.8 MG/DL
CHLORIDE SERPL-SCNC: 99 MMOL/L
CO2 SERPL-SCNC: 28 MMOL/L
CREAT SERPL-MCNC: 1.38 MG/DL
EGFR: 53 ML/MIN/1.73M2
EOSINOPHIL # BLD AUTO: 0.19 K/UL
EOSINOPHIL NFR BLD AUTO: 1.8 %
GLUCOSE SERPL-MCNC: 152 MG/DL
HCT VFR BLD CALC: 43.8 %
HGB BLD-MCNC: 14 G/DL
IMM GRANULOCYTES NFR BLD AUTO: 0.3 %
LYMPHOCYTES # BLD AUTO: 2.29 K/UL
LYMPHOCYTES NFR BLD AUTO: 22.2 %
MAN DIFF?: NORMAL
MCHC RBC-ENTMCNC: 30.4 PG
MCHC RBC-ENTMCNC: 32 GM/DL
MCV RBC AUTO: 95 FL
MONOCYTES # BLD AUTO: 0.88 K/UL
MONOCYTES NFR BLD AUTO: 8.5 %
NEUTROPHILS # BLD AUTO: 6.87 K/UL
NEUTROPHILS NFR BLD AUTO: 66.8 %
PLATELET # BLD AUTO: 213 K/UL
POTASSIUM SERPL-SCNC: 4 MMOL/L
RBC # BLD: 4.61 M/UL
RBC # FLD: 14.9 %
SODIUM SERPL-SCNC: 141 MMOL/L
WBC # FLD AUTO: 10.3 K/UL

## 2023-03-31 NOTE — PHYSICAL EXAM
[No Acute Distress] : no acute distress [Well Nourished] : well nourished [Well Developed] : well developed [Well-Appearing] : well-appearing [Normal Sclera/Conjunctiva] : normal sclera/conjunctiva [Normal Outer Ear/Nose] : the outer ears and nose were normal in appearance [Normal Oropharynx] : the oropharynx was normal [No JVD] : no jugular venous distention [No Lymphadenopathy] : no lymphadenopathy [Supple] : supple [No Respiratory Distress] : no respiratory distress  [No Accessory Muscle Use] : no accessory muscle use [Clear to Auscultation] : lungs were clear to auscultation bilaterally [Normal Rate] : normal rate  [Regular Rhythm] : with a regular rhythm [Normal S1, S2] : normal S1 and S2 [No Murmur] : no murmur heard [No Carotid Bruits] : no carotid bruits [No Varicosities] : no varicosities [Pedal Pulses Present] : the pedal pulses are present [No Extremity Clubbing/Cyanosis] : no extremity clubbing/cyanosis [Soft] : abdomen soft [Non Tender] : non-tender [Non-distended] : non-distended [Normal Bowel Sounds] : normal bowel sounds [Normal Anterior Cervical Nodes] : no anterior cervical lymphadenopathy [No CVA Tenderness] : no CVA  tenderness [No Spinal Tenderness] : no spinal tenderness [No Joint Swelling] : no joint swelling [Grossly Normal Strength/Tone] : grossly normal strength/tone [Coordination Grossly Intact] : coordination grossly intact [No Focal Deficits] : no focal deficits [Normal Gait] : normal gait [Normal Affect] : the affect was normal [Alert and Oriented x3] : oriented to person, place, and time [de-identified] : Trace edema b/l lower extremities improved from last week but not resolved [de-identified] : erythema of RLE improved, no tender or warm, has overlying dry skin

## 2023-03-31 NOTE — HISTORY OF PRESENT ILLNESS
[FreeTextEntry1] : Leg Swelling Follow up  [de-identified] : NANDO HERNANDEZ is a 77 year old male, non-smoker with PMH of PAD, CKD, HLD, HTN, gout presented to the office today for leg swelling follow up. Pt finished antibiotic course and says redness, pain and swelling all improving. Also finished medrol dose back and gout pain resolved. Is continuing torsemide 40mg bid and has urinated a lot more and leg swelling improved. . Pt states that he has itching on his legs, dry in appearance. \par Patient feels well. No complaints. Denies chest pain, sob, christine, dizziness, diaphoresis, palpitations, orthopnea, syncope, n/v, headache.\par

## 2023-04-06 ENCOUNTER — NON-APPOINTMENT (OUTPATIENT)
Age: 78
End: 2023-04-06

## 2023-04-07 ENCOUNTER — APPOINTMENT (OUTPATIENT)
Dept: INTERNAL MEDICINE | Facility: CLINIC | Age: 78
End: 2023-04-07
Payer: MEDICARE

## 2023-04-07 VITALS
BODY MASS INDEX: 40.66 KG/M2 | OXYGEN SATURATION: 97 % | HEIGHT: 70 IN | DIASTOLIC BLOOD PRESSURE: 60 MMHG | WEIGHT: 284 LBS | HEART RATE: 68 BPM | SYSTOLIC BLOOD PRESSURE: 118 MMHG

## 2023-04-07 DIAGNOSIS — E87.5 HYPERKALEMIA: ICD-10-CM

## 2023-04-07 DIAGNOSIS — I73.9 PERIPHERAL VASCULAR DISEASE, UNSPECIFIED: ICD-10-CM

## 2023-04-07 DIAGNOSIS — E79.0 HYPERURICEMIA W/OUT SIGNS OF INFLAMMATORY ARTHRITIS AND TOPHACEOUS DISEASE: ICD-10-CM

## 2023-04-07 PROCEDURE — 99204 OFFICE O/P NEW MOD 45 MIN: CPT

## 2023-04-07 RX ORDER — AMOXICILLIN AND CLAVULANATE POTASSIUM 875; 125 MG/1; MG/1
875-125 TABLET, COATED ORAL
Qty: 20 | Refills: 0 | Status: DISCONTINUED | COMMUNITY
Start: 2023-03-23 | End: 2023-04-07

## 2023-04-07 NOTE — PLAN
[FreeTextEntry1] : Edema of both feet, right > left- c/w torsemide, will change antibiotics to keflex to cover for cellulitis\par HTN - controlled\par Dilated aortic root - f/u with cardiology\par Gout/HLD/pre-dm - c/w same\par \par f/u in 1 week

## 2023-04-07 NOTE — PHYSICAL EXAM
[No Acute Distress] : no acute distress [Normal Sclera/Conjunctiva] : normal sclera/conjunctiva [Normal Outer Ear/Nose] : the outer ears and nose were normal in appearance [No JVD] : no jugular venous distention [No Respiratory Distress] : no respiratory distress  [No Accessory Muscle Use] : no accessory muscle use [Clear to Auscultation] : lungs were clear to auscultation bilaterally [Normal Rate] : normal rate  [Regular Rhythm] : with a regular rhythm [Normal S1, S2] : normal S1 and S2 [No Edema] : there was no peripheral edema [Soft] : abdomen soft [No CVA Tenderness] : no CVA  tenderness [No Spinal Tenderness] : no spinal tenderness [de-identified] : no calf tenderness or erythema [de-identified] : venous stasis of RLE with ulcers and erythema

## 2023-04-07 NOTE — HISTORY OF PRESENT ILLNESS
[FreeTextEntry8] : 77 year old male with PMHx of PAD, PVD, CKD, HTN, HLD who presents with swelling/erythema of B/L LE, right greater than left.  He finished 1 week of augmentin but reports it did not help.

## 2023-04-07 NOTE — REVIEW OF SYSTEMS
[Chest Pain] : no chest pain [Palpitations] : no palpitations [Shortness Of Breath] : no shortness of breath [Wheezing] : no wheezing [Abdominal Pain] : no abdominal pain [Vomiting] : no vomiting [Dysuria] : no dysuria [Hematuria] : no hematuria [Joint Pain] : no joint pain [Back Pain] : no back pain [Itching] : no itching [Skin Rash] : no skin rash

## 2023-04-08 NOTE — PATIENT PROFILE ADULT. - NS PRO ABUSE SCREEN AFRAID ANYONE YN
Call primary care provider for follow up after discharge/Activities as tolerated/Low salt diet/Monitor weight daily/Report signs and symptoms to primary care provider no

## 2023-04-20 ENCOUNTER — APPOINTMENT (OUTPATIENT)
Dept: INFUSION THERAPY | Facility: HOSPITAL | Age: 78
End: 2023-04-20

## 2023-04-28 ENCOUNTER — APPOINTMENT (OUTPATIENT)
Dept: INTERNAL MEDICINE | Facility: CLINIC | Age: 78
End: 2023-04-28
Payer: MEDICARE

## 2023-04-28 VITALS — SYSTOLIC BLOOD PRESSURE: 102 MMHG | DIASTOLIC BLOOD PRESSURE: 70 MMHG

## 2023-04-28 VITALS — HEART RATE: 76 BPM | OXYGEN SATURATION: 97 %

## 2023-04-28 PROCEDURE — 99214 OFFICE O/P EST MOD 30 MIN: CPT

## 2023-04-29 LAB
ALBUMIN SERPL ELPH-MCNC: 4.2 G/DL
ALP BLD-CCNC: 93 U/L
ALT SERPL-CCNC: 28 U/L
ANION GAP SERPL CALC-SCNC: 16 MMOL/L
AST SERPL-CCNC: 27 U/L
BASOPHILS # BLD AUTO: 0.04 K/UL
BASOPHILS NFR BLD AUTO: 0.5 %
BILIRUB SERPL-MCNC: 0.4 MG/DL
BUN SERPL-MCNC: 29 MG/DL
CALCIUM SERPL-MCNC: 9.7 MG/DL
CHLORIDE SERPL-SCNC: 102 MMOL/L
CO2 SERPL-SCNC: 24 MMOL/L
CREAT SERPL-MCNC: 1.33 MG/DL
EGFR: 55 ML/MIN/1.73M2
EOSINOPHIL # BLD AUTO: 0.23 K/UL
EOSINOPHIL NFR BLD AUTO: 2.6 %
GLUCOSE SERPL-MCNC: 145 MG/DL
HCT VFR BLD CALC: 43.1 %
HGB BLD-MCNC: 13.6 G/DL
IMM GRANULOCYTES NFR BLD AUTO: 0.3 %
LYMPHOCYTES # BLD AUTO: 1.69 K/UL
LYMPHOCYTES NFR BLD AUTO: 19.1 %
MAN DIFF?: NORMAL
MCHC RBC-ENTMCNC: 30.5 PG
MCHC RBC-ENTMCNC: 31.6 GM/DL
MCV RBC AUTO: 96.6 FL
MONOCYTES # BLD AUTO: 0.92 K/UL
MONOCYTES NFR BLD AUTO: 10.4 %
NEUTROPHILS # BLD AUTO: 5.96 K/UL
NEUTROPHILS NFR BLD AUTO: 67.1 %
NT-PROBNP SERPL-MCNC: 275 PG/ML
PLATELET # BLD AUTO: 214 K/UL
POTASSIUM SERPL-SCNC: 4.1 MMOL/L
PROT SERPL-MCNC: 7.3 G/DL
RBC # BLD: 4.46 M/UL
RBC # FLD: 14.5 %
SODIUM SERPL-SCNC: 143 MMOL/L
WBC # FLD AUTO: 8.87 K/UL

## 2023-04-30 NOTE — HISTORY OF PRESENT ILLNESS
[FreeTextEntry8] : CC: Cellulitis\par \par NANDO HERNANDEZ is a 77 year old male, non-smoker with PMH of PAD, CKD, HLD, HTN, gout presented to the office today for c/o redness and swelling to the right lower extremity. Pt was treated with keflex and steroids by Dr Roberts starting in week of April 7th and says hes R leg redness, swelling, pain all improved but now is starting to worsen again as he finished antibitoic a few days ago. Prior to this I treated him with augmentin which he says helped his redness and pain\par Pt says now leg is red, hot, painful again and he wants antibiotics and steroid again\par Pt says he is compliant with diuretic twice a day. Says he urinattes a fair amount after taking it.\par Denies f/c, \par Has not seen wound care. \par . Denies chest pain, sob, christine, dizziness, diaphoresis, palpitations, LE swelling, orthopnea, syncope, n/v, headache.\par \par

## 2023-04-30 NOTE — PHYSICAL EXAM
[Normal] : no carotid or abdominal bruits heard, no varicosities, pedal pulses are present, no peripheral edema, no extremity clubbing or cyanosis and no palpable aorta [de-identified] : 1+ pitting edema b/l [de-identified] : R anteior tibia with mild redness, tenderness, and warmth and swelling

## 2023-05-02 ENCOUNTER — APPOINTMENT (OUTPATIENT)
Dept: WOUND CARE | Facility: CLINIC | Age: 78
End: 2023-05-02
Payer: MEDICARE

## 2023-05-02 VITALS
SYSTOLIC BLOOD PRESSURE: 115 MMHG | DIASTOLIC BLOOD PRESSURE: 79 MMHG | OXYGEN SATURATION: 96 % | HEART RATE: 75 BPM | TEMPERATURE: 98.1 F

## 2023-05-02 DIAGNOSIS — I89.0 LYMPHEDEMA, NOT ELSEWHERE CLASSIFIED: ICD-10-CM

## 2023-05-02 DIAGNOSIS — I89.8 OTHER SPECIFIED NONINFECTIVE DISORDERS OF LYMPHATIC VESSELS AND LYMPH NODES: ICD-10-CM

## 2023-05-02 DIAGNOSIS — I87.2 VENOUS INSUFFICIENCY (CHRONIC) (PERIPHERAL): ICD-10-CM

## 2023-05-02 PROCEDURE — 99214 OFFICE O/P EST MOD 30 MIN: CPT

## 2023-05-02 NOTE — REVIEW OF SYSTEMS
[Feeling Tired] : feeling tired [SOB on Exertion] : shortness of breath during exertion [Limb Swelling] : limb swelling [Skin Wound] : skin wound [Negative] : Neurological

## 2023-05-03 ENCOUNTER — APPOINTMENT (OUTPATIENT)
Dept: ULTRASOUND IMAGING | Facility: CLINIC | Age: 78
End: 2023-05-03
Payer: MEDICARE

## 2023-05-03 ENCOUNTER — OUTPATIENT (OUTPATIENT)
Dept: OUTPATIENT SERVICES | Facility: HOSPITAL | Age: 78
LOS: 1 days | End: 2023-05-03
Payer: MEDICARE

## 2023-05-03 ENCOUNTER — APPOINTMENT (OUTPATIENT)
Dept: INTERNAL MEDICINE | Facility: CLINIC | Age: 78
End: 2023-05-03

## 2023-05-03 DIAGNOSIS — Z98.890 OTHER SPECIFIED POSTPROCEDURAL STATES: Chronic | ICD-10-CM

## 2023-05-03 DIAGNOSIS — R73.09 OTHER ABNORMAL GLUCOSE: ICD-10-CM

## 2023-05-03 DIAGNOSIS — I10 ESSENTIAL (PRIMARY) HYPERTENSION: ICD-10-CM

## 2023-05-03 DIAGNOSIS — L03.115 CELLULITIS OF RIGHT LOWER LIMB: ICD-10-CM

## 2023-05-03 DIAGNOSIS — M79.89 OTHER SPECIFIED SOFT TISSUE DISORDERS: ICD-10-CM

## 2023-05-03 DIAGNOSIS — Z96.60 PRESENCE OF UNSPECIFIED ORTHOPEDIC JOINT IMPLANT: Chronic | ICD-10-CM

## 2023-05-03 DIAGNOSIS — Z96.653 PRESENCE OF ARTIFICIAL KNEE JOINT, BILATERAL: Chronic | ICD-10-CM

## 2023-05-03 PROCEDURE — 93970 EXTREMITY STUDY: CPT

## 2023-05-03 PROCEDURE — 93970 EXTREMITY STUDY: CPT | Mod: 26

## 2023-05-04 PROBLEM — I89.0 LYMPHEDEMA: Status: ACTIVE | Noted: 2023-05-04

## 2023-05-04 PROBLEM — I89.8 LYMPHORRHEA: Status: ACTIVE | Noted: 2023-05-04

## 2023-05-04 NOTE — HISTORY OF PRESENT ILLNESS
[FreeTextEntry1] : 5/2/23\par Presents today with complaints of pain in legs, swelling, s/p RFA bilateral legs\par Does not wear compression garments - does not like how it feels\par does not have a lymphedema pump\par right calf with edema, open superficial area\par lymphorrea\par chronic lymphedema\par

## 2023-05-04 NOTE — ASSESSMENT
[FreeTextEntry1] : 5/2/23\par 76 y/o history of venous insufficiency and s/p bilateral RFA\par Reports he does NOT wear compression "i do not like how it feels"\par does not have a lymphedema pump\par discussed that present situation of leg swelling with superficial openings is due to edema hence causing wounds\par stressed compression is lifelong despite procedures\par compression wrap shown as an option to socks\par chronic lymphedema

## 2023-05-04 NOTE — PHYSICAL EXAM
[1+] : left 1+ [Ankle Swelling Bilaterally] : bilaterally  [Varicose Veins Of Lower Extremities] : bilaterally [Ankle Swelling On The Left] : moderate [Skin Ulcer] : ulcer [Alert] : alert [Oriented to Person] : oriented to person [Oriented to Place] : oriented to place [Oriented to Time] : oriented to time [Calm] : calm [Please See PDF for [employer]] : Please See PDF for [employer]. [de-identified] : NAD [de-identified] : equal/strong

## 2023-05-04 NOTE — PLAN
[FreeTextEntry1] : 5/2/23\par Plan - discussed that despite having procedures done on legs - needs to wear compression , that is life long\par showed compression wrap as an option to wearing a traditional sock\par measured for compression wrap and ordered through our vendor - pt. given copy and told to follow up with vendor to purchase\par order lymphedema pump\par follow up 3 - 4 weeks

## 2023-05-05 ENCOUNTER — NON-APPOINTMENT (OUTPATIENT)
Age: 78
End: 2023-05-05

## 2023-05-08 ENCOUNTER — OUTPATIENT (OUTPATIENT)
Dept: OUTPATIENT SERVICES | Facility: HOSPITAL | Age: 78
LOS: 1 days | Discharge: HOME | End: 2023-05-08

## 2023-05-08 DIAGNOSIS — Z96.653 PRESENCE OF ARTIFICIAL KNEE JOINT, BILATERAL: Chronic | ICD-10-CM

## 2023-05-08 DIAGNOSIS — C7A.8 OTHER MALIGNANT NEUROENDOCRINE TUMORS: ICD-10-CM

## 2023-05-08 DIAGNOSIS — Z98.890 OTHER SPECIFIED POSTPROCEDURAL STATES: Chronic | ICD-10-CM

## 2023-05-08 DIAGNOSIS — Z96.60 PRESENCE OF UNSPECIFIED ORTHOPEDIC JOINT IMPLANT: Chronic | ICD-10-CM

## 2023-05-19 ENCOUNTER — APPOINTMENT (OUTPATIENT)
Dept: HEMATOLOGY ONCOLOGY | Facility: CLINIC | Age: 78
End: 2023-05-19

## 2023-05-19 ENCOUNTER — APPOINTMENT (OUTPATIENT)
Dept: INFUSION THERAPY | Facility: HOSPITAL | Age: 78
End: 2023-05-19

## 2023-05-22 DIAGNOSIS — D3A.8 OTHER BENIGN NEUROENDOCRINE TUMORS: ICD-10-CM

## 2023-05-30 ENCOUNTER — NON-APPOINTMENT (OUTPATIENT)
Age: 78
End: 2023-05-30

## 2023-05-30 ENCOUNTER — APPOINTMENT (OUTPATIENT)
Dept: CARDIOLOGY | Facility: CLINIC | Age: 78
End: 2023-05-30
Payer: MEDICARE

## 2023-05-30 VITALS
WEIGHT: 284 LBS | HEIGHT: 70 IN | OXYGEN SATURATION: 97 % | BODY MASS INDEX: 40.66 KG/M2 | SYSTOLIC BLOOD PRESSURE: 110 MMHG | HEART RATE: 89 BPM | DIASTOLIC BLOOD PRESSURE: 56 MMHG | TEMPERATURE: 98.1 F

## 2023-05-30 DIAGNOSIS — I49.3 VENTRICULAR PREMATURE DEPOLARIZATION: ICD-10-CM

## 2023-05-30 DIAGNOSIS — E55.9 VITAMIN D DEFICIENCY, UNSPECIFIED: ICD-10-CM

## 2023-05-30 DIAGNOSIS — Z12.5 ENCOUNTER FOR SCREENING FOR MALIGNANT NEOPLASM OF PROSTATE: ICD-10-CM

## 2023-05-30 DIAGNOSIS — R79.89 OTHER SPECIFIED ABNORMAL FINDINGS OF BLOOD CHEMISTRY: ICD-10-CM

## 2023-05-30 PROCEDURE — 99214 OFFICE O/P EST MOD 30 MIN: CPT

## 2023-05-30 PROCEDURE — 93000 ELECTROCARDIOGRAM COMPLETE: CPT

## 2023-05-30 NOTE — HISTORY OF PRESENT ILLNESS
[FreeTextEntry1] : This is a 76 y/o male with a pmhx of PAD, CKD, HLD, HTN, who presents to the office for follow-up. Pt continues to reports significant LE edema. He was seen by wound care and prescribed antibiotic ointment. Pt reports he will be fitting today for Lymphedema pump. Pt reports dyspnea on exertion. Patient denies chest pain, palpitations, dizziness, syncope, changes in bowel/bladder habits or appetite. \par

## 2023-05-30 NOTE — REVIEW OF SYSTEMS
[Negative] : Heme/Lymph [Dyspnea on exertion] : dyspnea during exertion [Lower Ext Edema] : lower extremity edema [SOB] : no shortness of breath [Chest Discomfort] : no chest discomfort [Leg Claudication] : no intermittent leg claudication [Palpitations] : no palpitations [Orthopnea] : no orthopnea [PND] : no PND [Syncope] : no syncope

## 2023-05-30 NOTE — PHYSICAL EXAM
[Well Developed] : well developed [Well Nourished] : well nourished [No Acute Distress] : no acute distress [Normal Conjunctiva] : normal conjunctiva [Normal Venous Pressure] : normal venous pressure [No Carotid Bruit] : no carotid bruit [Normal S1, S2] : normal S1, S2 [No Murmur] : no murmur [No Rub] : no rub [No Gallop] : no gallop [Clear Lung Fields] : clear lung fields [Good Air Entry] : good air entry [No Respiratory Distress] : no respiratory distress  [Soft] : abdomen soft [Non Tender] : non-tender [No Masses/organomegaly] : no masses/organomegaly [Normal Bowel Sounds] : normal bowel sounds [Normal Gait] : normal gait [No Cyanosis] : no cyanosis [No Clubbing] : no clubbing [No Varicosities] : no varicosities [No Skin Lesions] : no skin lesions [Moves all extremities] : moves all extremities [No Focal Deficits] : no focal deficits [Normal Speech] : normal speech [Alert and Oriented] : alert and oriented [Normal memory] : normal memory [Edema ___] : edema [unfilled] [Venous stasis] : venous stasis [de-identified] : erythema of RLE

## 2023-06-01 ENCOUNTER — NON-APPOINTMENT (OUTPATIENT)
Age: 78
End: 2023-06-01

## 2023-06-07 ENCOUNTER — APPOINTMENT (OUTPATIENT)
Dept: INTERNAL MEDICINE | Facility: CLINIC | Age: 78
End: 2023-06-07

## 2023-06-08 ENCOUNTER — RX RENEWAL (OUTPATIENT)
Age: 78
End: 2023-06-08

## 2023-06-09 ENCOUNTER — APPOINTMENT (OUTPATIENT)
Dept: CARDIOLOGY | Facility: CLINIC | Age: 78
End: 2023-06-09
Payer: MEDICARE

## 2023-06-09 VITALS
HEART RATE: 85 BPM | SYSTOLIC BLOOD PRESSURE: 110 MMHG | OXYGEN SATURATION: 98 % | TEMPERATURE: 97.7 F | WEIGHT: 279 LBS | DIASTOLIC BLOOD PRESSURE: 70 MMHG | HEIGHT: 70 IN | BODY MASS INDEX: 39.94 KG/M2

## 2023-06-09 PROCEDURE — 93306 TTE W/DOPPLER COMPLETE: CPT

## 2023-06-09 PROCEDURE — 99214 OFFICE O/P EST MOD 30 MIN: CPT

## 2023-06-09 NOTE — HISTORY OF PRESENT ILLNESS
[FreeTextEntry1] : This is a 76 y/o male with a pmhx of of PAD, CKD, HLD, HTN, who presents to the office for follow-up. Patient was last seen in the office on 5/30/23, with edema and redness of RLE s/p course of doxy and keflex, started on cipro and advised to follow up with wound care. Patient reports he has been using lymphedema pump with some relief. He reports edema and erythema have improved.  Patient continues to reports dyspnea on exertion.\par Patient denies dyspnea, palpitations, dizziness, syncope, changes in bowel/bladder habits or appetite.

## 2023-06-09 NOTE — PHYSICAL EXAM
[Well Developed] : well developed [Well Nourished] : well nourished [No Acute Distress] : no acute distress [Normal Conjunctiva] : normal conjunctiva [Normal Venous Pressure] : normal venous pressure [No Carotid Bruit] : no carotid bruit [Normal S1, S2] : normal S1, S2 [No Murmur] : no murmur [No Rub] : no rub [No Gallop] : no gallop [Clear Lung Fields] : clear lung fields [Good Air Entry] : good air entry [No Respiratory Distress] : no respiratory distress  [Soft] : abdomen soft [Non Tender] : non-tender [No Masses/organomegaly] : no masses/organomegaly [Normal Bowel Sounds] : normal bowel sounds [Normal Gait] : normal gait [No Cyanosis] : no cyanosis [No Clubbing] : no clubbing [No Varicosities] : no varicosities [Edema ___] : edema [unfilled] [Venous stasis] : venous stasis [No Skin Lesions] : no skin lesions [Moves all extremities] : moves all extremities [No Focal Deficits] : no focal deficits [Normal Speech] : normal speech [Alert and Oriented] : alert and oriented [Normal memory] : normal memory

## 2023-06-13 ENCOUNTER — APPOINTMENT (OUTPATIENT)
Dept: WOUND CARE | Facility: CLINIC | Age: 78
End: 2023-06-13

## 2023-06-13 LAB
25(OH)D3 SERPL-MCNC: 43.9 NG/ML
25(OH)D3 SERPL-MCNC: 44.1 NG/ML
ALBUMIN SERPL ELPH-MCNC: 4.1 G/DL
ALBUMIN SERPL ELPH-MCNC: 4.2 G/DL
ALBUMIN SERPL ELPH-MCNC: 4.4 G/DL
ALBUMIN SERPL ELPH-MCNC: 4.7 G/DL
ALBUMIN SERPL ELPH-MCNC: 4.8 G/DL
ALP BLD-CCNC: 63 U/L
ALP BLD-CCNC: 70 U/L
ALP BLD-CCNC: 74 U/L
ALP BLD-CCNC: 85 U/L
ALP BLD-CCNC: 98 U/L
ALT SERPL-CCNC: 28 U/L
ALT SERPL-CCNC: 33 U/L
ALT SERPL-CCNC: 47 U/L
ALT SERPL-CCNC: 57 U/L
ALT SERPL-CCNC: 61 U/L
ANA SER IF-ACNC: NEGATIVE
ANION GAP SERPL CALC-SCNC: 12 MMOL/L
ANION GAP SERPL CALC-SCNC: 14 MMOL/L
ANION GAP SERPL CALC-SCNC: 14 MMOL/L
ANION GAP SERPL CALC-SCNC: 15 MMOL/L
ANION GAP SERPL CALC-SCNC: 16 MMOL/L
ANION GAP SERPL CALC-SCNC: 16 MMOL/L
APPEARANCE: CLEAR
AST SERPL-CCNC: 26 U/L
AST SERPL-CCNC: 33 U/L
AST SERPL-CCNC: 33 U/L
AST SERPL-CCNC: 38 U/L
AST SERPL-CCNC: 48 U/L
BACTERIA: NEGATIVE /HPF
BASOPHILS # BLD AUTO: 0.03 K/UL
BASOPHILS # BLD AUTO: 0.04 K/UL
BASOPHILS # BLD AUTO: 0.05 K/UL
BASOPHILS NFR BLD AUTO: 0.4 %
BASOPHILS NFR BLD AUTO: 0.4 %
BASOPHILS NFR BLD AUTO: 0.6 %
BILIRUB DIRECT SERPL-MCNC: 0.1 MG/DL
BILIRUB DIRECT SERPL-MCNC: 0.2 MG/DL
BILIRUB DIRECT SERPL-MCNC: 0.2 MG/DL
BILIRUB INDIRECT SERPL-MCNC: 0.2 MG/DL
BILIRUB INDIRECT SERPL-MCNC: 0.2 MG/DL
BILIRUB INDIRECT SERPL-MCNC: 0.3 MG/DL
BILIRUB SERPL-MCNC: 0.3 MG/DL
BILIRUB SERPL-MCNC: 0.3 MG/DL
BILIRUB SERPL-MCNC: 0.4 MG/DL
BILIRUB SERPL-MCNC: 0.4 MG/DL
BILIRUB SERPL-MCNC: 0.5 MG/DL
BILIRUBIN URINE: NEGATIVE
BLOOD URINE: NEGATIVE
BUN SERPL-MCNC: 33 MG/DL
BUN SERPL-MCNC: 36 MG/DL
BUN SERPL-MCNC: 37 MG/DL
BUN SERPL-MCNC: 38 MG/DL
BUN SERPL-MCNC: 39 MG/DL
BUN SERPL-MCNC: 40 MG/DL
CALCIUM SERPL-MCNC: 9.3 MG/DL
CALCIUM SERPL-MCNC: 9.5 MG/DL
CALCIUM SERPL-MCNC: 9.6 MG/DL
CALCIUM SERPL-MCNC: 9.7 MG/DL
CALCIUM SERPL-MCNC: 9.8 MG/DL
CALCIUM SERPL-MCNC: 9.9 MG/DL
CAST: 1 /LPF
CCP AB SER IA-ACNC: <8 UNITS
CHLORIDE SERPL-SCNC: 100 MMOL/L
CHLORIDE SERPL-SCNC: 100 MMOL/L
CHLORIDE SERPL-SCNC: 102 MMOL/L
CHLORIDE SERPL-SCNC: 104 MMOL/L
CHLORIDE SERPL-SCNC: 105 MMOL/L
CHLORIDE SERPL-SCNC: 105 MMOL/L
CHOLEST SERPL-MCNC: 123 MG/DL
CHOLEST SERPL-MCNC: 131 MG/DL
CHOLEST SERPL-MCNC: 142 MG/DL
CHOLEST SERPL-MCNC: 156 MG/DL
CK SERPL-CCNC: 120 U/L
CK SERPL-CCNC: 72 U/L
CK SERPL-CCNC: 83 U/L
CK SERPL-CCNC: 93 U/L
CO2 SERPL-SCNC: 24 MMOL/L
CO2 SERPL-SCNC: 24 MMOL/L
CO2 SERPL-SCNC: 25 MMOL/L
CO2 SERPL-SCNC: 26 MMOL/L
COLOR: YELLOW
CREAT SERPL-MCNC: 1.26 MG/DL
CREAT SERPL-MCNC: 1.28 MG/DL
CREAT SERPL-MCNC: 1.34 MG/DL
CREAT SERPL-MCNC: 1.41 MG/DL
CREAT SERPL-MCNC: 1.44 MG/DL
CREAT SERPL-MCNC: 1.44 MG/DL
CRP SERPL-MCNC: 16 MG/L
DSDNA AB SER-ACNC: <12 IU/ML
EGFR: 50 ML/MIN/1.73M2
EGFR: 50 ML/MIN/1.73M2
EGFR: 51 ML/MIN/1.73M2
EGFR: 55 ML/MIN/1.73M2
EGFR: 58 ML/MIN/1.73M2
EGFR: 59 ML/MIN/1.73M2
EOSINOPHIL # BLD AUTO: 0.18 K/UL
EOSINOPHIL # BLD AUTO: 0.33 K/UL
EOSINOPHIL # BLD AUTO: 0.5 K/UL
EOSINOPHIL NFR BLD AUTO: 2.3 %
EOSINOPHIL NFR BLD AUTO: 3.5 %
EOSINOPHIL NFR BLD AUTO: 6.1 %
EPITHELIAL CELLS: 0 /HPF
ERYTHROCYTE [SEDIMENTATION RATE] IN BLOOD BY WESTERGREN METHOD: 22 MM/HR
ESTIMATED AVERAGE GLUCOSE: 143 MG/DL
ESTIMATED AVERAGE GLUCOSE: 148 MG/DL
ESTIMATED AVERAGE GLUCOSE: 154 MG/DL
ESTIMATED AVERAGE GLUCOSE: 169 MG/DL
GLUCOSE QUALITATIVE U: NEGATIVE MG/DL
GLUCOSE SERPL-MCNC: 110 MG/DL
GLUCOSE SERPL-MCNC: 113 MG/DL
GLUCOSE SERPL-MCNC: 132 MG/DL
GLUCOSE SERPL-MCNC: 133 MG/DL
GLUCOSE SERPL-MCNC: 144 MG/DL
GLUCOSE SERPL-MCNC: 154 MG/DL
HBA1C MFR BLD HPLC: 6.6 %
HBA1C MFR BLD HPLC: 6.8 %
HBA1C MFR BLD HPLC: 7 %
HBA1C MFR BLD HPLC: 7.5 %
HCT VFR BLD CALC: 44.8 %
HCT VFR BLD CALC: 45.3 %
HCT VFR BLD CALC: 46 %
HDLC SERPL-MCNC: 38 MG/DL
HDLC SERPL-MCNC: 44 MG/DL
HDLC SERPL-MCNC: 52 MG/DL
HDLC SERPL-MCNC: 52 MG/DL
HGB BLD-MCNC: 14.3 G/DL
HGB BLD-MCNC: 14.3 G/DL
HGB BLD-MCNC: 14.8 G/DL
IMM GRANULOCYTES NFR BLD AUTO: 0.2 %
IMM GRANULOCYTES NFR BLD AUTO: 0.3 %
IMM GRANULOCYTES NFR BLD AUTO: 0.3 %
KETONES URINE: NEGATIVE MG/DL
LDLC SERPL CALC-MCNC: 54 MG/DL
LDLC SERPL CALC-MCNC: 63 MG/DL
LDLC SERPL CALC-MCNC: 69 MG/DL
LDLC SERPL CALC-MCNC: 76 MG/DL
LDLC SERPL DIRECT ASSAY-MCNC: 58 MG/DL
LEUKOCYTE ESTERASE URINE: NEGATIVE
LYMPHOCYTES # BLD AUTO: 1.42 K/UL
LYMPHOCYTES # BLD AUTO: 1.78 K/UL
LYMPHOCYTES # BLD AUTO: 2.31 K/UL
LYMPHOCYTES NFR BLD AUTO: 17.3 %
LYMPHOCYTES NFR BLD AUTO: 22.7 %
LYMPHOCYTES NFR BLD AUTO: 24.4 %
MAN DIFF?: NORMAL
MCHC RBC-ENTMCNC: 30.2 PG
MCHC RBC-ENTMCNC: 30.4 PG
MCHC RBC-ENTMCNC: 31.1 GM/DL
MCHC RBC-ENTMCNC: 31.9 GM/DL
MCHC RBC-ENTMCNC: 31.9 PG
MCHC RBC-ENTMCNC: 32.7 GM/DL
MCV RBC AUTO: 94.7 FL
MCV RBC AUTO: 97.6 FL
MCV RBC AUTO: 97.9 FL
MICROSCOPIC-UA: NORMAL
MONOCYTES # BLD AUTO: 0.84 K/UL
MONOCYTES # BLD AUTO: 0.86 K/UL
MONOCYTES # BLD AUTO: 0.86 K/UL
MONOCYTES NFR BLD AUTO: 10.5 %
MONOCYTES NFR BLD AUTO: 10.7 %
MONOCYTES NFR BLD AUTO: 9.1 %
NEUTROPHILS # BLD AUTO: 4.98 K/UL
NEUTROPHILS # BLD AUTO: 5.36 K/UL
NEUTROPHILS # BLD AUTO: 5.9 K/UL
NEUTROPHILS NFR BLD AUTO: 62.3 %
NEUTROPHILS NFR BLD AUTO: 63.6 %
NEUTROPHILS NFR BLD AUTO: 65.3 %
NITRITE URINE: NEGATIVE
NONHDLC SERPL-MCNC: 118 MG/DL
NONHDLC SERPL-MCNC: 79 MG/DL
NONHDLC SERPL-MCNC: 79 MG/DL
NONHDLC SERPL-MCNC: 91 MG/DL
NT-PROBNP SERPL-MCNC: 145 PG/ML
NT-PROBNP SERPL-MCNC: 416 PG/ML
PH URINE: 5.5
PLATELET # BLD AUTO: 212 K/UL
PLATELET # BLD AUTO: 221 K/UL
PLATELET # BLD AUTO: 228 K/UL
POTASSIUM SERPL-SCNC: 3.4 MMOL/L
POTASSIUM SERPL-SCNC: 3.6 MMOL/L
POTASSIUM SERPL-SCNC: 3.8 MMOL/L
POTASSIUM SERPL-SCNC: 4 MMOL/L
POTASSIUM SERPL-SCNC: 4.1 MMOL/L
POTASSIUM SERPL-SCNC: 4.8 MMOL/L
PROT SERPL-MCNC: 6.6 G/DL
PROT SERPL-MCNC: 6.9 G/DL
PROT SERPL-MCNC: 7.2 G/DL
PROT SERPL-MCNC: 7.3 G/DL
PROT SERPL-MCNC: 7.6 G/DL
PROTEIN URINE: NEGATIVE MG/DL
PSA SERPL-MCNC: 1.81 NG/ML
RBC # BLD: 4.64 M/UL
RBC # BLD: 4.7 M/UL
RBC # BLD: 4.73 M/UL
RBC # FLD: 14.6 %
RBC # FLD: 14.7 %
RBC # FLD: 14.9 %
RED BLOOD CELLS URINE: 0 /HPF
RF+CCP IGG SER-IMP: NEGATIVE
RHEUMATOID FACT SER QL: <10 IU/ML
SODIUM SERPL-SCNC: 140 MMOL/L
SODIUM SERPL-SCNC: 141 MMOL/L
SODIUM SERPL-SCNC: 142 MMOL/L
SODIUM SERPL-SCNC: 142 MMOL/L
SODIUM SERPL-SCNC: 143 MMOL/L
SODIUM SERPL-SCNC: 143 MMOL/L
SPECIFIC GRAVITY URINE: 1.01
T4 FREE SERPL-MCNC: 1 NG/DL
T4 FREE SERPL-MCNC: 1 NG/DL
TRIGL SERPL-MCNC: 110 MG/DL
TRIGL SERPL-MCNC: 125 MG/DL
TRIGL SERPL-MCNC: 206 MG/DL
TRIGL SERPL-MCNC: 79 MG/DL
TSH SERPL-ACNC: 1.84 UIU/ML
TSH SERPL-ACNC: 1.94 UIU/ML
URATE SERPL-MCNC: 10 MG/DL
URATE SERPL-MCNC: 8.1 MG/DL
URATE SERPL-MCNC: 8.3 MG/DL
UROBILINOGEN URINE: 0.2 MG/DL
WBC # FLD AUTO: 7.83 K/UL
WBC # FLD AUTO: 8.21 K/UL
WBC # FLD AUTO: 9.47 K/UL
WHITE BLOOD CELLS URINE: 0 /HPF

## 2023-06-16 ENCOUNTER — APPOINTMENT (OUTPATIENT)
Dept: HEMATOLOGY ONCOLOGY | Facility: CLINIC | Age: 78
End: 2023-06-16
Payer: MEDICARE

## 2023-06-16 ENCOUNTER — APPOINTMENT (OUTPATIENT)
Dept: INFUSION THERAPY | Facility: HOSPITAL | Age: 78
End: 2023-06-16

## 2023-06-16 VITALS
TEMPERATURE: 97.2 F | SYSTOLIC BLOOD PRESSURE: 115 MMHG | WEIGHT: 282.19 LBS | HEART RATE: 86 BPM | BODY MASS INDEX: 40.49 KG/M2 | RESPIRATION RATE: 14 BRPM | DIASTOLIC BLOOD PRESSURE: 75 MMHG | OXYGEN SATURATION: 99 %

## 2023-06-16 PROCEDURE — 99214 OFFICE O/P EST MOD 30 MIN: CPT

## 2023-06-16 NOTE — REVIEW OF SYSTEMS
[Negative] : Allergic/Immunologic [FreeTextEntry7] : diarrhea has subsided. [FreeTextEntry9] : weakness and pain in back due to prior surgeries. [de-identified] : ankle swelling

## 2023-06-16 NOTE — HISTORY OF PRESENT ILLNESS
[Disease: _____________________] : Disease: [unfilled] [Home] : at home, [unfilled] , at the time of the visit. [Verbal consent obtained from patient] : the patient, [unfilled] [de-identified] : Mrs. Espinal is a 76 year old female with HTN, HLD presenting to the office for an initial consultation of metastatic neuroendocrine CA.\par \par Patient underwent CT chest on 7/20/2021 for dilated aorta which revealed unchanged dilated 4.4 cm ascending aorta.\par Fatty liver and superimposed new nodules versus nodular sparing. Further evaluation with contrast-enhanced CT or MRI is recommended\par \par MRI abdomen on 8/3/2021: Numerous hyperenhancing liver metastases. Correlate clinically with history of neuroendocrine tumor or melanoma. A 7 mm lesion in the left lobe anteriorly is amenable to CT-guided biopsy for definitive tissue characterization. Results discussed with Dr. Hameed at time of interpretation.\par \par FDG-PET/CT 8/13/2021: No evidence of FDG-avid disease.\par Nonvisualization of hyperenhancing hepatic lesions seen on MRI dated 8/3/2021. Differential diagnosis includes metastatic well-differentiated neuroendocrine tumor which may not be FDG-avid. Histologic correlation is recommended. DOTATATE-PET/CT may be performed if there is biopsy confirmation of neuroendocrine tumor.\par \par US guided liver bx on 8/17/2021 revealed metastatic well differentiated metastatic neuroendocrine CA.\par Ki index ~ 20%\par \par 9/23/21\par PET DOTATATE reviewed. \par there is a pancreatic primary.\par Pancreatic polypeptide is elevated > 2000\par All other functional biochemicals are negative.\par He has no symtoms from the disease.\par We discussed and review patient information regarding lanreotide.\par He signed a consent.\par will arrange to start lanreotide 120 mg q 4 weeks\par \par 10/20/2021:\par Patient is here for follow up s/p discharge from [facility] from 10/8-10/12.\par He was admitted for generalized Weakness, hypotension, and Diarrhea. \par Patient is here for follow up s/p discharge from [facility] from 10/8-10/12.\par He was admitted for generalized Weakness, hypotension, and Diarrhea. 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 Mojica (Vascular) on 10/29/2021. \par Patient with rash to the center of the buttock. He is using a cream prescribed by the hospital, that he states is helping with improvement. \par Patient also found to be in MINNIE.  Renal was consulted and patient's Minnie was thought to be due to ATN from hypotension (patient's BP was 81/47 on arrival) and recent GI losses. Patient received IVF and a sodium bicarbonate infusion with improvement in his kidney function.\par Patient is now back to baseline.\par Patient was also noted to have elevated Troponin however cardiology does not think its cardiac related.\par He is feeling stronger and denies diarrhea.  He is having one bowel movement every other day.  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 Mojica (Vascular) on 10/29/2021. \par Patient with rash to the center of the buttock. He is using a cream prescribed by the hospital, that he states is helping with improvement. \par Patient also found to be in MINNIE.  Renal was consulted and patient's Minnie was thought to be due to ATN from hypotension (patient's BP was 81/47 on arrival) and recent GI losses. Patient received IVF and a sodium bicarbonate infusion with improvement in his kidney function.\par Patient is now back to baseline.\par He is feeling stronger and denies diarrhea.  He is having one bowel movement every other day.  The stool is formed.\par His energy level is excellent.\par \par 12/6/2021: TEB\par 1) Neuroendocrine: \par Patient is tolerating daily octreotide injections well however does not prefer s/q injections.\par Was on lanreotide but was admitted for diarrhea.\par Patient is having 2 bowel movements/day which is semi-formed, not watery.\par Patient is doing well today and voices no new complaints.\par His energy level and appetite remain stable.\par His spouse is giving him daily Octreotide injections but is having difficulty.\par Will transition patient over to acting octreotide (Sandostatin LAR) every 4 weeks.\par We reviewed most common adverse events.\par Patient will come tomorrow (12/7) for blood work (pancreatic polypeptide)\par \par 1/10/2022:\par Patient is here for C2 Octreotide.\par He is tolerating treatment relatively well.\par Admits to intermittent diarrhea.  At most has 2 bowel movements/day which is not bothersome.\par Denies abdominal pain or diarrhea today.\par He underwent lab work with his internist, Dr. Hameed.\par Labs are unremarkable.\par His pancreatic polypeptide levels were elevated at baseline, will redraw today.\par No other complaints.\par \par 3/7/2022:\par C4 Octreotide 20 mg today.\par He is tolerating injection well and voices no major adverse events.\par Admits to diarrhea on day 1 of his cycle which subsides afterwards.\par On average has 2 formed bowel movements/day.\par Denies abdominal pain/cramping or fatigue.\par No restrictions with his ADLs.\par \par \par 06/16/2023\par C7 Octreotide 40 mg IM today.\par Tolerating injection well.\par Reports no pain from the injection site.\par No other side effects from the Octreotide.\par He developed right lower extremity cellulitis and was treated with Augmentin.\par He completed his abx with improvement.\par Patient continues to have bilateral LE edema and will go to [facility] for lymphedema therapy.\par He is currently on Torsemide 40 mg PO BID.\par US doppler negative on 05/2023.\par Labs today- pancreatic polypeptide\par \par \par \par  [de-identified] : well differentiated neuroendocrine tumor [de-identified] : Internist/Cardiology: Agustin Hameed\par \par Yamilet - wife: [phone]\par Holly - patient : [phone]

## 2023-06-16 NOTE — PHYSICAL EXAM
[Restricted in physically strenuous activity but ambulatory and able to carry out work of a light or sedentary nature] : Status 1- Restricted in physically strenuous activity but ambulatory and able to carry out work of a light or sedentary nature, e.g., light house work, office work [Obese] : obese [Normal] : affect appropriate [de-identified] : uses cane for balance and weakness [de-identified] : obese, left anterior incision for prior back surgery. Liver is not palpable. [de-identified] : 2+ bilateral ankle edema [de-identified] : thoracic and lumbar spine incision from 4 prior back surgeries with hardware [de-identified] : bilateral anterior knee incisions from prior knee replacements

## 2023-06-19 ENCOUNTER — APPOINTMENT (OUTPATIENT)
Dept: CARDIOLOGY | Facility: CLINIC | Age: 78
End: 2023-06-19
Payer: MEDICARE

## 2023-06-19 ENCOUNTER — NON-APPOINTMENT (OUTPATIENT)
Age: 78
End: 2023-06-19

## 2023-06-19 VITALS
DIASTOLIC BLOOD PRESSURE: 60 MMHG | OXYGEN SATURATION: 99 % | SYSTOLIC BLOOD PRESSURE: 90 MMHG | WEIGHT: 282 LBS | HEIGHT: 70 IN | BODY MASS INDEX: 40.37 KG/M2 | HEART RATE: 94 BPM | TEMPERATURE: 97.5 F

## 2023-06-19 DIAGNOSIS — E66.9 OBESITY, UNSPECIFIED: ICD-10-CM

## 2023-06-19 DIAGNOSIS — L03.115 CELLULITIS OF RIGHT LOWER LIMB: ICD-10-CM

## 2023-06-19 LAB
ALBUMIN SERPL ELPH-MCNC: 4.1 G/DL
ALP BLD-CCNC: 85 U/L
ALT SERPL-CCNC: 23 U/L
ANION GAP SERPL CALC-SCNC: 12 MMOL/L
AST SERPL-CCNC: 24 U/L
BILIRUB SERPL-MCNC: 0.4 MG/DL
BUN SERPL-MCNC: 23 MG/DL
CALCIUM SERPL-MCNC: 9.3 MG/DL
CHLORIDE SERPL-SCNC: 106 MMOL/L
CO2 SERPL-SCNC: 24 MMOL/L
CREAT SERPL-MCNC: 1.13 MG/DL
EGFR: 67 ML/MIN/1.73M2
GLUCOSE SERPL-MCNC: 155 MG/DL
LDH SERPL-CCNC: 186 U/L
POTASSIUM SERPL-SCNC: 4.8 MMOL/L
PROT SERPL-MCNC: 6.2 G/DL
SODIUM SERPL-SCNC: 143 MMOL/L

## 2023-06-19 PROCEDURE — 93000 ELECTROCARDIOGRAM COMPLETE: CPT

## 2023-06-19 PROCEDURE — 99214 OFFICE O/P EST MOD 30 MIN: CPT

## 2023-06-19 NOTE — HISTORY OF PRESENT ILLNESS
[FreeTextEntry1] : Holly is a 77 y.o male w/MHx of Neuroendocrine Ca of liver, Dilated Aorta, PVD, HLD, HTN, DM who presents for follow up. Did c/o raines at last visit. Wife did express increased labored breathing, irritability. 6/9/2023 TTE reviewed. Was advised MPI stress test however was not performed. Too c/o b/l le edema. w/ recent cellulitis of rle, s/p abx, sees wound care, using lymphedema pump, performing STARS. Hx PVD, was advised to see Dr. Barrios. pt continues to reports RAINES. Denies any chest pain \par \par 5/30/2023 bnp [id number]

## 2023-06-21 ENCOUNTER — RX RENEWAL (OUTPATIENT)
Age: 78
End: 2023-06-21

## 2023-06-22 ENCOUNTER — NON-APPOINTMENT (OUTPATIENT)
Age: 78
End: 2023-06-22

## 2023-06-23 ENCOUNTER — OUTPATIENT (OUTPATIENT)
Dept: OUTPATIENT SERVICES | Facility: HOSPITAL | Age: 78
LOS: 1 days | End: 2023-06-23
Payer: MEDICARE

## 2023-06-23 ENCOUNTER — TRANSCRIPTION ENCOUNTER (OUTPATIENT)
Age: 78
End: 2023-06-23

## 2023-06-23 VITALS
RESPIRATION RATE: 16 BRPM | OXYGEN SATURATION: 97 % | HEART RATE: 77 BPM | SYSTOLIC BLOOD PRESSURE: 118 MMHG | DIASTOLIC BLOOD PRESSURE: 61 MMHG

## 2023-06-23 VITALS
WEIGHT: 274.92 LBS | DIASTOLIC BLOOD PRESSURE: 62 MMHG | TEMPERATURE: 98 F | SYSTOLIC BLOOD PRESSURE: 127 MMHG | RESPIRATION RATE: 16 BRPM | HEIGHT: 72 IN | HEART RATE: 74 BPM | OXYGEN SATURATION: 98 %

## 2023-06-23 DIAGNOSIS — R06.02 SHORTNESS OF BREATH: ICD-10-CM

## 2023-06-23 DIAGNOSIS — Z96.653 PRESENCE OF ARTIFICIAL KNEE JOINT, BILATERAL: Chronic | ICD-10-CM

## 2023-06-23 DIAGNOSIS — Z96.60 PRESENCE OF UNSPECIFIED ORTHOPEDIC JOINT IMPLANT: Chronic | ICD-10-CM

## 2023-06-23 DIAGNOSIS — Z98.890 OTHER SPECIFIED POSTPROCEDURAL STATES: Chronic | ICD-10-CM

## 2023-06-23 LAB
ALBUMIN SERPL ELPH-MCNC: 4 G/DL — SIGNIFICANT CHANGE UP (ref 3.3–5)
ALP SERPL-CCNC: 85 U/L — SIGNIFICANT CHANGE UP (ref 40–120)
ALT FLD-CCNC: 29 U/L — SIGNIFICANT CHANGE UP (ref 10–45)
ANION GAP SERPL CALC-SCNC: 18 MMOL/L — HIGH (ref 5–17)
AST SERPL-CCNC: 30 U/L — SIGNIFICANT CHANGE UP (ref 10–40)
BILIRUB SERPL-MCNC: 0.2 MG/DL — SIGNIFICANT CHANGE UP (ref 0.2–1.2)
BUN SERPL-MCNC: 36 MG/DL — HIGH (ref 7–23)
CALCIUM SERPL-MCNC: 9.7 MG/DL — SIGNIFICANT CHANGE UP (ref 8.4–10.5)
CHLORIDE SERPL-SCNC: 105 MMOL/L — SIGNIFICANT CHANGE UP (ref 96–108)
CO2 SERPL-SCNC: 22 MMOL/L — SIGNIFICANT CHANGE UP (ref 22–31)
CREAT SERPL-MCNC: 1.3 MG/DL — SIGNIFICANT CHANGE UP (ref 0.5–1.3)
EGFR: 57 ML/MIN/1.73M2 — LOW
GLUCOSE BLDC GLUCOMTR-MCNC: 147 MG/DL — HIGH (ref 70–99)
GLUCOSE SERPL-MCNC: 151 MG/DL — HIGH (ref 70–99)
HCT VFR BLD CALC: 40.3 % — SIGNIFICANT CHANGE UP (ref 39–50)
HGB BLD-MCNC: 13 G/DL — SIGNIFICANT CHANGE UP (ref 13–17)
MCHC RBC-ENTMCNC: 30.6 PG — SIGNIFICANT CHANGE UP (ref 27–34)
MCHC RBC-ENTMCNC: 32.3 GM/DL — SIGNIFICANT CHANGE UP (ref 32–36)
MCV RBC AUTO: 94.8 FL — SIGNIFICANT CHANGE UP (ref 80–100)
NRBC # BLD: 0 /100 WBCS — SIGNIFICANT CHANGE UP (ref 0–0)
PANC POLYPEPT SERPL-MCNC: 2121 PG/ML
PLATELET # BLD AUTO: 190 K/UL — SIGNIFICANT CHANGE UP (ref 150–400)
POTASSIUM SERPL-MCNC: 4.3 MMOL/L — SIGNIFICANT CHANGE UP (ref 3.5–5.3)
POTASSIUM SERPL-SCNC: 4.3 MMOL/L — SIGNIFICANT CHANGE UP (ref 3.5–5.3)
PROT SERPL-MCNC: 6.6 G/DL — SIGNIFICANT CHANGE UP (ref 6–8.3)
RBC # BLD: 4.25 M/UL — SIGNIFICANT CHANGE UP (ref 4.2–5.8)
RBC # FLD: 15 % — HIGH (ref 10.3–14.5)
SODIUM SERPL-SCNC: 145 MMOL/L — SIGNIFICANT CHANGE UP (ref 135–145)
WBC # BLD: 7.52 K/UL — SIGNIFICANT CHANGE UP (ref 3.8–10.5)
WBC # FLD AUTO: 7.52 K/UL — SIGNIFICANT CHANGE UP (ref 3.8–10.5)

## 2023-06-23 PROCEDURE — 92928 PRQ TCAT PLMT NTRAC ST 1 LES: CPT | Mod: LD

## 2023-06-23 PROCEDURE — 93456 R HRT CORONARY ARTERY ANGIO: CPT | Mod: 26,59

## 2023-06-23 PROCEDURE — 93005 ELECTROCARDIOGRAM TRACING: CPT

## 2023-06-23 PROCEDURE — C1725: CPT

## 2023-06-23 PROCEDURE — 80053 COMPREHEN METABOLIC PANEL: CPT

## 2023-06-23 PROCEDURE — 93010 ELECTROCARDIOGRAM REPORT: CPT

## 2023-06-23 PROCEDURE — C1760: CPT

## 2023-06-23 PROCEDURE — C1874: CPT

## 2023-06-23 PROCEDURE — 82803 BLOOD GASES ANY COMBINATION: CPT

## 2023-06-23 PROCEDURE — 85027 COMPLETE CBC AUTOMATED: CPT

## 2023-06-23 PROCEDURE — C1887: CPT

## 2023-06-23 PROCEDURE — C9600: CPT | Mod: LD

## 2023-06-23 PROCEDURE — 82962 GLUCOSE BLOOD TEST: CPT

## 2023-06-23 PROCEDURE — 93456 R HRT CORONARY ARTERY ANGIO: CPT | Mod: 59

## 2023-06-23 PROCEDURE — C1769: CPT

## 2023-06-23 PROCEDURE — C1894: CPT

## 2023-06-23 PROCEDURE — 93010 ELECTROCARDIOGRAM REPORT: CPT | Mod: 77

## 2023-06-23 PROCEDURE — 99152 MOD SED SAME PHYS/QHP 5/>YRS: CPT

## 2023-06-23 PROCEDURE — C1889: CPT

## 2023-06-23 RX ORDER — ASPIRIN/CALCIUM CARB/MAGNESIUM 324 MG
1 TABLET ORAL
Refills: 0 | DISCHARGE

## 2023-06-23 RX ORDER — SODIUM CHLORIDE 9 MG/ML
250 INJECTION INTRAMUSCULAR; INTRAVENOUS; SUBCUTANEOUS ONCE
Refills: 0 | Status: COMPLETED | OUTPATIENT
Start: 2023-06-23 | End: 2023-06-23

## 2023-06-23 RX ORDER — CLOPIDOGREL BISULFATE 75 MG/1
1 TABLET, FILM COATED ORAL
Qty: 90 | Refills: 3
Start: 2023-06-23 | End: 2024-06-16

## 2023-06-23 RX ORDER — SODIUM CHLORIDE 9 MG/ML
1000 INJECTION INTRAMUSCULAR; INTRAVENOUS; SUBCUTANEOUS
Refills: 0 | Status: DISCONTINUED | OUTPATIENT
Start: 2023-06-23 | End: 2023-07-07

## 2023-06-23 RX ORDER — ASPIRIN/CALCIUM CARB/MAGNESIUM 324 MG
1 TABLET ORAL
Qty: 90 | Refills: 3
Start: 2023-06-23 | End: 2024-06-16

## 2023-06-23 RX ADMIN — SODIUM CHLORIDE 750 MILLILITER(S): 9 INJECTION INTRAMUSCULAR; INTRAVENOUS; SUBCUTANEOUS at 09:50

## 2023-06-23 RX ADMIN — SODIUM CHLORIDE 100 MILLILITER(S): 9 INJECTION INTRAMUSCULAR; INTRAVENOUS; SUBCUTANEOUS at 12:56

## 2023-06-23 RX ADMIN — SODIUM CHLORIDE 75 MILLILITER(S): 9 INJECTION INTRAMUSCULAR; INTRAVENOUS; SUBCUTANEOUS at 09:50

## 2023-06-23 NOTE — ASU DISCHARGE PLAN (ADULT/PEDIATRIC) - ASU DC SPECIAL INSTRUCTIONSFT
Wound Care:   the day AFTER your procedure remove bandage GENTLY, and clean using  mild soap and gentle warm, water stream, pat dry. leave OPEN to air. YOU MAY SHOWER   DO NOT apply lotions, creams, ointments, powder, perfumes to your incision site  DO NOT SOAK your site for 1 week ( no baths, no pools, no tubs, etc...)  Check  your groin and /or wrist daily. A small amount of bruising, and soreness are normal    ACTIVITY: for 24 hours   - DO NOT DRIVE  - DO NOT make any important decisions or sign legal documents   - DO NOT operate heavy machineries   - you may resume sexual activity in 48 hours, unless otherwise instructed by your cardiologist     Your procedure was done through the [facility]: for the NEXT 5 DAYS  - Limit climbing stairs, DO NOT soak in bathtub or pool  - no strenuous activities, pushing, pulling, straining  - Do not lift anything 10lbs or heavier     MEDICATION:   take your medications as explained ( see discharge paperwork)   If you received a STENT, you will be taking antiplatelet medications to KEEP YOUR STENT OPEN ( eg: Aspirin, Plavix, Brilinta, Effient, etc).  Take as prescribed DO NOT STOP taking them without consulting with your cardiologist first.     Follow heart healthy diet recommended by your doctor, if you smoke STOP SMOKING ( may call [phone] for center of tobacco control if you need assistance)     CALL your doctor to make appointment in 2 WEEKS     ***CALL YOUR DOCTOR***  if you experience: fever, chills, body aches, or severe pain, swelling, redness, heat or yellow discharge at incision site  If you experience bleeding or excruciating pain at the procedural site, swelling ( golf ball size) at your procedural site  If you experience CHEST PAIN  If you experience extremity numbness, tingling, temperature change ( of your procedural site)   If you are unable to reach your doctor, you may contact:   -Cardiology Office at [facility] at [phone] or   - [facility] [phone]  - [facility] [phone]

## 2023-06-23 NOTE — H&P CARDIOLOGY - HISTORY OF PRESENT ILLNESS
76 yo M PMHx HTN, HLD, DIOGO, ETOH use disorder, Spinal surgeries,  Neuroendocrine Ca of liver, Dilated Aorta, PVD, DMT2 who presents with christine.  6/9/2023 TTE. Was advised MPI stress test however was not performed. Pt reports b/l le edema with recent cellulitis of rle, s/p abx, sees wound care, using lymphedema pump, performing STARS.    6/9/23 TTE LVEF Normal.    Pt was referred for Cardiac Cath by Dr Hameed.       76 yo M PMHx HTN, HLD, DIOGO, Spinal surgeries,  Neuroendocrine Ca of liver, Dilated Aorta, PVD, DMT2 (Hgba1c 7.5 6/13/23, pt follows [facility]) who presents with christine. Was advised MPI stress test however was not performed. TTE 6/9/23 Normal LVEF. Pt reports b/l le edema with recent cellulitis of rle, completed abx, sees wound Clinic @ [place], using lymphedema pump, performing STARS.    6/9/23 TTE LVEF Normal.    Pt was referred for Cardiac Cath by Dr Hameed.

## 2023-06-27 LAB
HGB BLDA-MCNC: 13 G/DL — SIGNIFICANT CHANGE UP (ref 12.6–17.4)
HGB FLD-MCNC: 12.6 G/DL — SIGNIFICANT CHANGE UP (ref 12.6–17.4)
OXYHGB MFR BLDA: 96.3 % — HIGH (ref 90–95)
OXYHGB MFR BLDMV: 69.5 % — LOW (ref 90–95)
SAO2 % BLD: 70.8 % — SIGNIFICANT CHANGE UP (ref 60–90)
SAO2 % BLDA: 98.1 % — HIGH (ref 94–98)

## 2023-06-29 ENCOUNTER — NON-APPOINTMENT (OUTPATIENT)
Age: 78
End: 2023-06-29

## 2023-06-29 ENCOUNTER — APPOINTMENT (OUTPATIENT)
Dept: CARDIOLOGY | Facility: CLINIC | Age: 78
End: 2023-06-29
Payer: MEDICARE

## 2023-06-29 VITALS
TEMPERATURE: 97.4 F | HEART RATE: 91 BPM | WEIGHT: 278 LBS | OXYGEN SATURATION: 99 % | SYSTOLIC BLOOD PRESSURE: 90 MMHG | HEIGHT: 70 IN | DIASTOLIC BLOOD PRESSURE: 60 MMHG | BODY MASS INDEX: 39.8 KG/M2

## 2023-06-29 DIAGNOSIS — Z95.820 PERIPHERAL VASCULAR ANGIOPLASTY STATUS WITH IMPLANTS AND GRAFTS: ICD-10-CM

## 2023-06-29 DIAGNOSIS — I71.20 THORACIC AORTIC ANEURYSM, WITHOUT RUPTURE, UNSPECIFIED: ICD-10-CM

## 2023-06-29 PROCEDURE — 93000 ELECTROCARDIOGRAM COMPLETE: CPT

## 2023-06-29 PROCEDURE — 99214 OFFICE O/P EST MOD 30 MIN: CPT

## 2023-06-29 NOTE — PHYSICAL EXAM
[Well Developed] : well developed [Well Nourished] : well nourished [No Acute Distress] : no acute distress [Normal Conjunctiva] : normal conjunctiva [Normal Venous Pressure] : normal venous pressure [No Carotid Bruit] : no carotid bruit [Normal S1, S2] : normal S1, S2 [No Murmur] : no murmur [No Rub] : no rub [No Gallop] : no gallop [Clear Lung Fields] : clear lung fields [Good Air Entry] : good air entry [Soft] : abdomen soft [No Respiratory Distress] : no respiratory distress  [Non Tender] : non-tender [No Masses/organomegaly] : no masses/organomegaly [Normal Bowel Sounds] : normal bowel sounds [Normal Gait] : normal gait [No Edema] : no edema [No Cyanosis] : no cyanosis [No Clubbing] : no clubbing [No Varicosities] : no varicosities [No Rash] : no rash [No Skin Lesions] : no skin lesions [Moves all extremities] : moves all extremities [No Focal Deficits] : no focal deficits [Normal Speech] : normal speech [Alert and Oriented] : alert and oriented [Normal memory] : normal memory [de-identified] : venous stasis changes

## 2023-06-29 NOTE — HISTORY OF PRESENT ILLNESS
[FreeTextEntry1] : Holly is a 77 y.o male w/MHx of Neuroendocrine Ca of liver, Dilated Aorta, PVD, HLD, HTN, DM who presents for follow up. Did c/o christine at last visit. Wife did express increased labored breathing, irritability. 6/9/2023 TTE reviewed.\par Patient s/p right and left heart catheterization on 6/23/23 with one stent placement to LAD .On Plavix and aspirin.Reports improvements in SOB.Does report chest tightness .Also c/o feeling tired.Bilateral lower extremity is swollen.

## 2023-07-06 ENCOUNTER — APPOINTMENT (OUTPATIENT)
Dept: DERMATOLOGY | Facility: CLINIC | Age: 78
End: 2023-07-06

## 2023-07-06 ENCOUNTER — OUTPATIENT (OUTPATIENT)
Dept: OUTPATIENT SERVICES | Facility: HOSPITAL | Age: 78
LOS: 1 days | Discharge: HOME | End: 2023-07-06

## 2023-07-06 DIAGNOSIS — Z96.60 PRESENCE OF UNSPECIFIED ORTHOPEDIC JOINT IMPLANT: Chronic | ICD-10-CM

## 2023-07-06 DIAGNOSIS — Z96.653 PRESENCE OF ARTIFICIAL KNEE JOINT, BILATERAL: Chronic | ICD-10-CM

## 2023-07-06 DIAGNOSIS — Z98.890 OTHER SPECIFIED POSTPROCEDURAL STATES: Chronic | ICD-10-CM

## 2023-07-06 DIAGNOSIS — C7A.8 OTHER MALIGNANT NEUROENDOCRINE TUMORS: ICD-10-CM

## 2023-07-14 ENCOUNTER — APPOINTMENT (OUTPATIENT)
Dept: INFUSION THERAPY | Facility: HOSPITAL | Age: 78
End: 2023-07-14

## 2023-07-14 ENCOUNTER — APPOINTMENT (OUTPATIENT)
Dept: HEMATOLOGY ONCOLOGY | Facility: CLINIC | Age: 78
End: 2023-07-14

## 2023-07-17 ENCOUNTER — APPOINTMENT (OUTPATIENT)
Dept: CARDIOLOGY | Facility: CLINIC | Age: 78
End: 2023-07-17
Payer: MEDICARE

## 2023-07-17 ENCOUNTER — LABORATORY RESULT (OUTPATIENT)
Age: 78
End: 2023-07-17

## 2023-07-17 VITALS
HEIGHT: 70 IN | DIASTOLIC BLOOD PRESSURE: 70 MMHG | TEMPERATURE: 98.1 F | BODY MASS INDEX: 39.51 KG/M2 | SYSTOLIC BLOOD PRESSURE: 110 MMHG | OXYGEN SATURATION: 97 % | WEIGHT: 276 LBS | HEART RATE: 86 BPM

## 2023-07-17 DIAGNOSIS — L98.9 DISORDER OF THE SKIN AND SUBCUTANEOUS TISSUE, UNSPECIFIED: ICD-10-CM

## 2023-07-17 PROCEDURE — 99214 OFFICE O/P EST MOD 30 MIN: CPT

## 2023-07-17 PROCEDURE — 93000 ELECTROCARDIOGRAM COMPLETE: CPT

## 2023-07-17 NOTE — HISTORY OF PRESENT ILLNESS
[FreeTextEntry1] : This is a 78 y/o male with a pmhx of Neuroendocrine Ca of liver, Dilated Aorta, PVD, HLD, HTN, DM, CAD s/p [facility] 6/2023 with stent to LAD who presents for follow up. He was last seen in the office on 6/29/23 post cath and was reporting chest pain and started on protonix. Patient continues to report mid sternal chest tightness, worse with inhaling. He reports dyspnea is improving post cath. Patient also reports marks on skin. He reports edema has improved, still with some non healing wounds. \par Patient denies palpitations, dizziness, syncope, changes in bowel/bladder habits or appetite.

## 2023-08-11 ENCOUNTER — APPOINTMENT (OUTPATIENT)
Dept: INFUSION THERAPY | Facility: HOSPITAL | Age: 78
End: 2023-08-11

## 2023-08-11 ENCOUNTER — APPOINTMENT (OUTPATIENT)
Dept: HEMATOLOGY ONCOLOGY | Facility: CLINIC | Age: 78
End: 2023-08-11
Payer: MEDICARE

## 2023-08-11 VITALS
RESPIRATION RATE: 14 BRPM | BODY MASS INDEX: 39.65 KG/M2 | HEART RATE: 69 BPM | WEIGHT: 277 LBS | HEIGHT: 70 IN | OXYGEN SATURATION: 96 % | DIASTOLIC BLOOD PRESSURE: 70 MMHG | TEMPERATURE: 97.2 F | SYSTOLIC BLOOD PRESSURE: 122 MMHG

## 2023-08-11 PROCEDURE — 99214 OFFICE O/P EST MOD 30 MIN: CPT

## 2023-08-11 NOTE — PHYSICAL EXAM
[Restricted in physically strenuous activity but ambulatory and able to carry out work of a light or sedentary nature] : Status 1- Restricted in physically strenuous activity but ambulatory and able to carry out work of a light or sedentary nature, e.g., light house work, office work [Obese] : obese [Normal] : affect appropriate [de-identified] : uses cane for balance and weakness [de-identified] : obese, left anterior incision for prior back surgery. Liver is not palpable. [de-identified] : 2+ bilateral ankle edema [de-identified] : thoracic and lumbar spine incision from 4 prior back surgeries with hardware [de-identified] : bilateral anterior knee incisions from prior knee replacements

## 2023-08-11 NOTE — HISTORY OF PRESENT ILLNESS
[Disease: _____________________] : Disease: [unfilled] [Home] : at home, [unfilled] , at the time of the visit. [Verbal consent obtained from patient] : the patient, [unfilled] [de-identified] : Mrs. Espinal is a 76 year old female with HTN, HLD presenting to the office for an initial consultation of metastatic neuroendocrine CA.  Patient underwent CT chest on 7/20/2021 for dilated aorta which revealed unchanged dilated 4.4 cm ascending aorta. Fatty liver and superimposed new nodules versus nodular sparing. Further evaluation with contrast-enhanced CT or MRI is recommended  MRI abdomen on 8/3/2021: Numerous hyperenhancing liver metastases. Correlate clinically with history of neuroendocrine tumor or melanoma. A 7 mm lesion in the left lobe anteriorly is amenable to CT-guided biopsy for definitive tissue characterization. Results discussed with Dr. Hameed at time of interpretation.  FDG-PET/CT 8/13/2021: No evidence of FDG-avid disease. Nonvisualization of hyperenhancing hepatic lesions seen on MRI dated 8/3/2021. Differential diagnosis includes metastatic well-differentiated neuroendocrine tumor which may not be FDG-avid. Histologic correlation is recommended. DOTATATE-PET/CT may be performed if there is biopsy confirmation of neuroendocrine tumor.  US guided liver bx on 8/17/2021 revealed metastatic well differentiated metastatic neuroendocrine CA. Ki index ~ 20%  9/23/21 PET DOTATATE reviewed.  there is a pancreatic primary. Pancreatic polypeptide is elevated > 2000 All other functional biochemicals are negative. He has no symtoms from the disease. We discussed and review patient information regarding lanreotide. He signed a consent. will arrange to start lanreotide 120 mg q 4 weeks  10/20/2021: Patient is here for follow up s/p discharge from [facility] from 10/8-10/12. He was admitted for generalized Weakness, hypotension, and Diarrhea.  Patient is here for follow up s/p discharge from [facility] from 10/8-10/12. He was admitted for generalized Weakness, hypotension, and Diarrhea.  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 Mojica (Vascular) on 10/29/2021.  Patient with rash to the center of the buttock. He is using a cream prescribed by the hospital, that he states is helping with improvement.  Patient also found to be in MINNIE.  Renal was consulted and patient's Minnie was thought to be due to ATN from hypotension (patient's BP was 81/47 on arrival) and recent GI losses. Patient received IVF and a sodium bicarbonate infusion with improvement in his kidney function. Patient is now back to baseline. Patient was also noted to have elevated Troponin however cardiology does not think its cardiac related. He is feeling stronger and denies diarrhea.  He is having one bowel movement every other day.  The stool is formed. His energy level is excellent. 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 Mojica (Vascular) on 10/29/2021.  Patient with rash to the center of the buttock. He is using a cream prescribed by the hospital, that he states is helping with improvement.  Patient also found to be in MINNIE.  Renal was consulted and patient's Minnie was thought to be due to ATN from hypotension (patient's BP was 81/47 on arrival) and recent GI losses. Patient received IVF and a sodium bicarbonate infusion with improvement in his kidney function. Patient is now back to baseline. He is feeling stronger and denies diarrhea.  He is having one bowel movement every other day.  The stool is formed. His energy level is excellent.  12/6/2021: TEB 1) Neuroendocrine:  Patient is tolerating daily octreotide injections well however does not prefer s/q injections. Was on lanreotide but was admitted for diarrhea. Patient is having 2 bowel movements/day which is semi-formed, not watery. Patient is doing well today and voices no new complaints. His energy level and appetite remain stable. His spouse is giving him daily Octreotide injections but is having difficulty. Will transition patient over to acting octreotide (Sandostatin LAR) every 4 weeks. We reviewed most common adverse events. Patient will come tomorrow (12/7) for blood work (pancreatic polypeptide)  1/10/2022: Patient is here for C2 Octreotide. He is tolerating treatment relatively well. Admits to intermittent diarrhea.  At most has 2 bowel movements/day which is not bothersome. Denies abdominal pain or diarrhea today. He underwent lab work with his internist, Dr. Hameed. Labs are unremarkable. His pancreatic polypeptide levels were elevated at baseline, will redraw today. No other complaints.  3/7/2022: C4 Octreotide 20 mg today. He is tolerating injection well and voices no major adverse events. Admits to diarrhea on day 1 of his cycle which subsides afterwards. On average has 2 formed bowel movements/day. Denies abdominal pain/cramping or fatigue. No restrictions with his ADLs.   06/16/2023 C7 Octreotide 40 mg IM today. Tolerating injection well. Reports no pain from the injection site. No other side effects from the Octreotide. He developed right lower extremity cellulitis and was treated with Augmentin. He completed his abx with improvement. Patient continues to have bilateral LE edema and will go to [facility] for lymphedema therapy. He is currently on Torsemide 40 mg PO BID. US doppler negative on 05/2023. Labs today- pancreatic polypeptide  08/11/2023 C9 Octreotide 40 mg IM today. Tolerating injection well. Reports no pain from the injection site. No other side effects from the Octreotide. Admits to bilateral lower extremity and is using Furosemide BID with K+. He underwent cardiac catheterization due to history of CAD. [de-identified] : well differentiated neuroendocrine tumor [de-identified] : Internist/Cardiology: Agustin Hameed\par  \par  Yamilet - wife: [phone]\par  Holly - patient : [phone]

## 2023-08-11 NOTE — REVIEW OF SYSTEMS
[Negative] : Allergic/Immunologic [FreeTextEntry7] : diarrhea has subsided. [FreeTextEntry9] : weakness and pain in back due to prior surgeries. [de-identified] : ankle swelling

## 2023-08-11 NOTE — HISTORY OF PRESENT ILLNESS
[Disease: _____________________] : Disease: [unfilled] [Home] : at home, [unfilled] , at the time of the visit. [Verbal consent obtained from patient] : the patient, [unfilled] [de-identified] : Mrs. Espinal is a 76 year old female with HTN, HLD presenting to the office for an initial consultation of metastatic neuroendocrine CA.  Patient underwent CT chest on 7/20/2021 for dilated aorta which revealed unchanged dilated 4.4 cm ascending aorta. Fatty liver and superimposed new nodules versus nodular sparing. Further evaluation with contrast-enhanced CT or MRI is recommended  MRI abdomen on 8/3/2021: Numerous hyperenhancing liver metastases. Correlate clinically with history of neuroendocrine tumor or melanoma. A 7 mm lesion in the left lobe anteriorly is amenable to CT-guided biopsy for definitive tissue characterization. Results discussed with Dr. Hameed at time of interpretation.  FDG-PET/CT 8/13/2021: No evidence of FDG-avid disease. Nonvisualization of hyperenhancing hepatic lesions seen on MRI dated 8/3/2021. Differential diagnosis includes metastatic well-differentiated neuroendocrine tumor which may not be FDG-avid. Histologic correlation is recommended. DOTATATE-PET/CT may be performed if there is biopsy confirmation of neuroendocrine tumor.  US guided liver bx on 8/17/2021 revealed metastatic well differentiated metastatic neuroendocrine CA. Ki index ~ 20%  9/23/21 PET DOTATATE reviewed.  there is a pancreatic primary. Pancreatic polypeptide is elevated > 2000 All other functional biochemicals are negative. He has no symtoms from the disease. We discussed and review patient information regarding lanreotide. He signed a consent. will arrange to start lanreotide 120 mg q 4 weeks  10/20/2021: Patient is here for follow up s/p discharge from [facility] from 10/8-10/12. He was admitted for generalized Weakness, hypotension, and Diarrhea.  Patient is here for follow up s/p discharge from [facility] from 10/8-10/12. He was admitted for generalized Weakness, hypotension, and Diarrhea.  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 Mojica (Vascular) on 10/29/2021.  Patient with rash to the center of the buttock. He is using a cream prescribed by the hospital, that he states is helping with improvement.  Patient also found to be in MINNIE.  Renal was consulted and patient's Minnie was thought to be due to ATN from hypotension (patient's BP was 81/47 on arrival) and recent GI losses. Patient received IVF and a sodium bicarbonate infusion with improvement in his kidney function. Patient is now back to baseline. Patient was also noted to have elevated Troponin however cardiology does not think its cardiac related. He is feeling stronger and denies diarrhea.  He is having one bowel movement every other day.  The stool is formed. His energy level is excellent. 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 Mojica (Vascular) on 10/29/2021.  Patient with rash to the center of the buttock. He is using a cream prescribed by the hospital, that he states is helping with improvement.  Patient also found to be in MINNIE.  Renal was consulted and patient's Minnie was thought to be due to ATN from hypotension (patient's BP was 81/47 on arrival) and recent GI losses. Patient received IVF and a sodium bicarbonate infusion with improvement in his kidney function. Patient is now back to baseline. He is feeling stronger and denies diarrhea.  He is having one bowel movement every other day.  The stool is formed. His energy level is excellent.  12/6/2021: TEB 1) Neuroendocrine:  Patient is tolerating daily octreotide injections well however does not prefer s/q injections. Was on lanreotide but was admitted for diarrhea. Patient is having 2 bowel movements/day which is semi-formed, not watery. Patient is doing well today and voices no new complaints. His energy level and appetite remain stable. His spouse is giving him daily Octreotide injections but is having difficulty. Will transition patient over to acting octreotide (Sandostatin LAR) every 4 weeks. We reviewed most common adverse events. Patient will come tomorrow (12/7) for blood work (pancreatic polypeptide)  1/10/2022: Patient is here for C2 Octreotide. He is tolerating treatment relatively well. Admits to intermittent diarrhea.  At most has 2 bowel movements/day which is not bothersome. Denies abdominal pain or diarrhea today. He underwent lab work with his internist, Dr. Hameed. Labs are unremarkable. His pancreatic polypeptide levels were elevated at baseline, will redraw today. No other complaints.  3/7/2022: C4 Octreotide 20 mg today. He is tolerating injection well and voices no major adverse events. Admits to diarrhea on day 1 of his cycle which subsides afterwards. On average has 2 formed bowel movements/day. Denies abdominal pain/cramping or fatigue. No restrictions with his ADLs.   06/16/2023 C7 Octreotide 40 mg IM today. Tolerating injection well. Reports no pain from the injection site. No other side effects from the Octreotide. He developed right lower extremity cellulitis and was treated with Augmentin. He completed his abx with improvement. Patient continues to have bilateral LE edema and will go to [facility] for lymphedema therapy. He is currently on Torsemide 40 mg PO BID. US doppler negative on 05/2023. Labs today- pancreatic polypeptide  08/11/2023 C9 Octreotide 40 mg IM today. Tolerating injection well. Reports no pain from the injection site. No other side effects from the Octreotide. Admits to bilateral lower extremity and is using Furosemide BID with K+. He underwent cardiac catheterization due to history of CAD. [de-identified] : well differentiated neuroendocrine tumor [de-identified] : Internist/Cardiology: Agustin Hameed\par  \par  Yamilet - wife: [phone]\par  Holly - patient : [phone]

## 2023-08-11 NOTE — PHYSICAL EXAM
[Restricted in physically strenuous activity but ambulatory and able to carry out work of a light or sedentary nature] : Status 1- Restricted in physically strenuous activity but ambulatory and able to carry out work of a light or sedentary nature, e.g., light house work, office work [Obese] : obese [Normal] : affect appropriate [de-identified] : uses cane for balance and weakness [de-identified] : obese, left anterior incision for prior back surgery. Liver is not palpable. [de-identified] : 2+ bilateral ankle edema [de-identified] : thoracic and lumbar spine incision from 4 prior back surgeries with hardware [de-identified] : bilateral anterior knee incisions from prior knee replacements

## 2023-08-14 DIAGNOSIS — Z51.89 ENCOUNTER FOR OTHER SPECIFIED AFTERCARE: ICD-10-CM

## 2023-08-14 DIAGNOSIS — D3A.8 OTHER BENIGN NEUROENDOCRINE TUMORS: ICD-10-CM

## 2023-08-15 ENCOUNTER — RESULT REVIEW (OUTPATIENT)
Age: 78
End: 2023-08-15

## 2023-08-16 ENCOUNTER — OUTPATIENT (OUTPATIENT)
Dept: OUTPATIENT SERVICES | Facility: HOSPITAL | Age: 78
LOS: 1 days | End: 2023-08-16
Payer: MEDICARE

## 2023-08-16 ENCOUNTER — APPOINTMENT (OUTPATIENT)
Dept: CT IMAGING | Facility: IMAGING CENTER | Age: 78
End: 2023-08-16
Payer: MEDICARE

## 2023-08-16 DIAGNOSIS — Z96.653 PRESENCE OF ARTIFICIAL KNEE JOINT, BILATERAL: Chronic | ICD-10-CM

## 2023-08-16 DIAGNOSIS — Z96.60 PRESENCE OF UNSPECIFIED ORTHOPEDIC JOINT IMPLANT: Chronic | ICD-10-CM

## 2023-08-16 DIAGNOSIS — Z98.890 OTHER SPECIFIED POSTPROCEDURAL STATES: Chronic | ICD-10-CM

## 2023-08-16 DIAGNOSIS — C7A.8 OTHER MALIGNANT NEUROENDOCRINE TUMORS: ICD-10-CM

## 2023-08-16 PROCEDURE — 74177 CT ABD & PELVIS W/CONTRAST: CPT

## 2023-08-16 PROCEDURE — 74177 CT ABD & PELVIS W/CONTRAST: CPT | Mod: 26,MH

## 2023-08-16 PROCEDURE — 71260 CT THORAX DX C+: CPT

## 2023-08-16 PROCEDURE — 71260 CT THORAX DX C+: CPT | Mod: 26,MH

## 2023-08-28 ENCOUNTER — OUTPATIENT (OUTPATIENT)
Dept: OUTPATIENT SERVICES | Facility: HOSPITAL | Age: 78
LOS: 1 days | End: 2023-08-28
Payer: MEDICARE

## 2023-08-28 ENCOUNTER — TRANSCRIPTION ENCOUNTER (OUTPATIENT)
Age: 78
End: 2023-08-28

## 2023-08-28 ENCOUNTER — APPOINTMENT (OUTPATIENT)
Dept: MRI IMAGING | Facility: CLINIC | Age: 78
End: 2023-08-28
Payer: MEDICARE

## 2023-08-28 ENCOUNTER — NON-APPOINTMENT (OUTPATIENT)
Age: 78
End: 2023-08-28

## 2023-08-28 DIAGNOSIS — Z98.890 OTHER SPECIFIED POSTPROCEDURAL STATES: Chronic | ICD-10-CM

## 2023-08-28 DIAGNOSIS — Z96.653 PRESENCE OF ARTIFICIAL KNEE JOINT, BILATERAL: Chronic | ICD-10-CM

## 2023-08-28 DIAGNOSIS — Z96.60 PRESENCE OF UNSPECIFIED ORTHOPEDIC JOINT IMPLANT: Chronic | ICD-10-CM

## 2023-08-28 DIAGNOSIS — C7A.8 OTHER MALIGNANT NEUROENDOCRINE TUMORS: ICD-10-CM

## 2023-08-28 PROCEDURE — 74183 MRI ABD W/O CNTR FLWD CNTR: CPT | Mod: 26,MH

## 2023-08-28 PROCEDURE — A9585: CPT

## 2023-08-28 PROCEDURE — 74183 MRI ABD W/O CNTR FLWD CNTR: CPT

## 2023-08-31 ENCOUNTER — OUTPATIENT (OUTPATIENT)
Dept: OUTPATIENT SERVICES | Facility: HOSPITAL | Age: 78
LOS: 1 days | Discharge: HOME | End: 2023-08-31

## 2023-08-31 DIAGNOSIS — Z96.60 PRESENCE OF UNSPECIFIED ORTHOPEDIC JOINT IMPLANT: Chronic | ICD-10-CM

## 2023-08-31 DIAGNOSIS — Z98.890 OTHER SPECIFIED POSTPROCEDURAL STATES: Chronic | ICD-10-CM

## 2023-08-31 DIAGNOSIS — Z96.653 PRESENCE OF ARTIFICIAL KNEE JOINT, BILATERAL: Chronic | ICD-10-CM

## 2023-08-31 DIAGNOSIS — C7A.8 OTHER MALIGNANT NEUROENDOCRINE TUMORS: ICD-10-CM

## 2023-09-03 ENCOUNTER — RX RENEWAL (OUTPATIENT)
Age: 78
End: 2023-09-03

## 2023-09-05 ENCOUNTER — NON-APPOINTMENT (OUTPATIENT)
Age: 78
End: 2023-09-05

## 2023-09-07 ENCOUNTER — RESULT REVIEW (OUTPATIENT)
Age: 78
End: 2023-09-07

## 2023-09-08 ENCOUNTER — RESULT REVIEW (OUTPATIENT)
Age: 78
End: 2023-09-08

## 2023-09-08 ENCOUNTER — APPOINTMENT (OUTPATIENT)
Dept: HEMATOLOGY ONCOLOGY | Facility: CLINIC | Age: 78
End: 2023-09-08
Payer: MEDICARE

## 2023-09-08 ENCOUNTER — APPOINTMENT (OUTPATIENT)
Dept: INFUSION THERAPY | Facility: HOSPITAL | Age: 78
End: 2023-09-08

## 2023-09-08 VITALS
RESPIRATION RATE: 14 BRPM | BODY MASS INDEX: 39.45 KG/M2 | DIASTOLIC BLOOD PRESSURE: 71 MMHG | SYSTOLIC BLOOD PRESSURE: 110 MMHG | WEIGHT: 274.91 LBS | TEMPERATURE: 97.1 F | HEART RATE: 78 BPM | OXYGEN SATURATION: 99 %

## 2023-09-08 LAB
BASOPHILS # BLD AUTO: 0.04 K/UL — SIGNIFICANT CHANGE UP (ref 0–0.2)
BASOPHILS NFR BLD AUTO: 0.6 % — SIGNIFICANT CHANGE UP (ref 0–2)
EOSINOPHIL # BLD AUTO: 0.19 K/UL — SIGNIFICANT CHANGE UP (ref 0–0.5)
EOSINOPHIL NFR BLD AUTO: 2.6 % — SIGNIFICANT CHANGE UP (ref 0–6)
HCT VFR BLD CALC: 41 % — SIGNIFICANT CHANGE UP (ref 39–50)
HGB BLD-MCNC: 13.1 G/DL — SIGNIFICANT CHANGE UP (ref 13–17)
IMM GRANULOCYTES NFR BLD AUTO: 0.4 % — SIGNIFICANT CHANGE UP (ref 0–0.9)
LYMPHOCYTES # BLD AUTO: 1.37 K/UL — SIGNIFICANT CHANGE UP (ref 1–3.3)
LYMPHOCYTES # BLD AUTO: 19 % — SIGNIFICANT CHANGE UP (ref 13–44)
MCHC RBC-ENTMCNC: 29.8 PG — SIGNIFICANT CHANGE UP (ref 27–34)
MCHC RBC-ENTMCNC: 32 G/DL — SIGNIFICANT CHANGE UP (ref 32–36)
MCV RBC AUTO: 93.2 FL — SIGNIFICANT CHANGE UP (ref 80–100)
MONOCYTES # BLD AUTO: 0.72 K/UL — SIGNIFICANT CHANGE UP (ref 0–0.9)
MONOCYTES NFR BLD AUTO: 10 % — SIGNIFICANT CHANGE UP (ref 2–14)
NEUTROPHILS # BLD AUTO: 4.85 K/UL — SIGNIFICANT CHANGE UP (ref 1.8–7.4)
NEUTROPHILS NFR BLD AUTO: 67.4 % — SIGNIFICANT CHANGE UP (ref 43–77)
NRBC # BLD: 0 /100 WBCS — SIGNIFICANT CHANGE UP (ref 0–0)
PLATELET # BLD AUTO: 203 K/UL — SIGNIFICANT CHANGE UP (ref 150–400)
RBC # BLD: 4.4 M/UL — SIGNIFICANT CHANGE UP (ref 4.2–5.8)
RBC # FLD: 14.6 % — HIGH (ref 10.3–14.5)
WBC # BLD: 7.2 K/UL — SIGNIFICANT CHANGE UP (ref 3.8–10.5)
WBC # FLD AUTO: 7.2 K/UL — SIGNIFICANT CHANGE UP (ref 3.8–10.5)

## 2023-09-08 PROCEDURE — 99215 OFFICE O/P EST HI 40 MIN: CPT

## 2023-09-08 NOTE — HISTORY OF PRESENT ILLNESS
[Disease: _____________________] : Disease: [unfilled] [Home] : at home, [unfilled] , at the time of the visit. [Verbal consent obtained from patient] : the patient, [unfilled] [de-identified] : Mrs. Espinal is a 76 year old female with HTN, HLD presenting to the office for an initial consultation of metastatic neuroendocrine CA.  Patient underwent CT chest on 7/20/2021 for dilated aorta which revealed unchanged dilated 4.4 cm ascending aorta. Fatty liver and superimposed new nodules versus nodular sparing. Further evaluation with contrast-enhanced CT or MRI is recommended  MRI abdomen on 8/3/2021: Numerous hyperenhancing liver metastases. Correlate clinically with history of neuroendocrine tumor or melanoma. A 7 mm lesion in the left lobe anteriorly is amenable to CT-guided biopsy for definitive tissue characterization. Results discussed with Dr. Hameed at time of interpretation.  FDG-PET/CT 8/13/2021: No evidence of FDG-avid disease. Nonvisualization of hyperenhancing hepatic lesions seen on MRI dated 8/3/2021. Differential diagnosis includes metastatic well-differentiated neuroendocrine tumor which may not be FDG-avid. Histologic correlation is recommended. DOTATATE-PET/CT may be performed if there is biopsy confirmation of neuroendocrine tumor.  US guided liver bx on 8/17/2021 revealed metastatic well differentiated metastatic neuroendocrine CA. Ki index ~ 20%  9/23/21 PET DOTATATE reviewed.  there is a pancreatic primary. Pancreatic polypeptide is elevated > 2000 All other functional biochemicals are negative. He has no symtoms from the disease. We discussed and review patient information regarding lanreotide. He signed a consent. will arrange to start lanreotide 120 mg q 4 weeks  10/20/2021: Patient is here for follow up s/p discharge from [facility] from 10/8-10/12. He was admitted for generalized Weakness, hypotension, and Diarrhea.  Patient is here for follow up s/p discharge from [facility] from 10/8-10/12. He was admitted for generalized Weakness, hypotension, and Diarrhea.  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 Mojica (Vascular) on 10/29/2021.  Patient with rash to the center of the buttock. He is using a cream prescribed by the hospital, that he states is helping with improvement.  Patient also found to be in MINNIE.  Renal was consulted and patient's Minnie was thought to be due to ATN from hypotension (patient's BP was 81/47 on arrival) and recent GI losses. Patient received IVF and a sodium bicarbonate infusion with improvement in his kidney function. Patient is now back to baseline. Patient was also noted to have elevated Troponin however cardiology does not think its cardiac related. He is feeling stronger and denies diarrhea.  He is having one bowel movement every other day.  The stool is formed. His energy level is excellent. 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 Mojica (Vascular) on 10/29/2021.  Patient with rash to the center of the buttock. He is using a cream prescribed by the hospital, that he states is helping with improvement.  Patient also found to be in MINNIE.  Renal was consulted and patient's Minnie was thought to be due to ATN from hypotension (patient's BP was 81/47 on arrival) and recent GI losses. Patient received IVF and a sodium bicarbonate infusion with improvement in his kidney function. Patient is now back to baseline. He is feeling stronger and denies diarrhea.  He is having one bowel movement every other day.  The stool is formed. His energy level is excellent.  12/6/2021: TEB 1) Neuroendocrine:  Patient is tolerating daily octreotide injections well however does not prefer s/q injections. Was on lanreotide but was admitted for diarrhea. Patient is having 2 bowel movements/day which is semi-formed, not watery. Patient is doing well today and voices no new complaints. His energy level and appetite remain stable. His spouse is giving him daily Octreotide injections but is having difficulty. Will transition patient over to acting octreotide (Sandostatin LAR) every 4 weeks. We reviewed most common adverse events. Patient will come tomorrow (12/7) for blood work (pancreatic polypeptide)  1/10/2022: Patient is here for C2 Octreotide. He is tolerating treatment relatively well. Admits to intermittent diarrhea.  At most has 2 bowel movements/day which is not bothersome. Denies abdominal pain or diarrhea today. He underwent lab work with his internist, Dr. Hameed. Labs are unremarkable. His pancreatic polypeptide levels were elevated at baseline, will redraw today. No other complaints.  3/7/2022: C4 Octreotide 20 mg today. He is tolerating injection well and voices no major adverse events. Admits to diarrhea on day 1 of his cycle which subsides afterwards. On average has 2 formed bowel movements/day. Denies abdominal pain/cramping or fatigue. No restrictions with his ADLs.   06/16/2023 C7 Octreotide 40 mg IM today. Tolerating injection well. Reports no pain from the injection site. No other side effects from the Octreotide. He developed right lower extremity cellulitis and was treated with Augmentin. He completed his abx with improvement. Patient continues to have bilateral LE edema and will go to [facility] for lymphedema therapy. He is currently on Torsemide 40 mg PO BID. US doppler negative on 05/2023. Labs today- pancreatic polypeptide  08/11/2023 C9 Octreotide 40 mg IM today. Tolerating injection well. Reports no pain from the injection site. No other side effects from the Octreotide. Admits to bilateral lower extremity and is using Furosemide BID with K+. He underwent cardiac catheterization due to history of CAD.  9/8/23 patient is accompanied by 2 daughters today they are here to support his treatment and planning going forward communication and appt with me have been limited with him; and he has not been receiving my voicemails, and often calls go to voicemail daughters are willing to receive calls to support him going foward CT scan, MRI and labs reveiwed It is clear that his disease is entirely in the liver per these scans, and there is progression. whether there is transformation is unclear. PET dotatate has been ordered and will be scheduled today I have been in contact with nuc med for consideration of PRRT, as well as with IR for consideration of SIRT Also, we reviewed consideration of other oral therapy, such as CAPETEM, sutent and afinitor sutent may not be a good idea at present, since he had a cardiac stent placed in distal LAD 2 months ago. [de-identified] : well differentiated neuroendocrine tumor [de-identified] : Internist/Cardiology: Agustin Woodard - wife: [phone] Holly - patient : [phone] Lisa  [phone] Pia  [phone]

## 2023-09-08 NOTE — DISCUSSION/SUMMARY
[FreeTextEntry1] : I called and left another message for patient. We need the MRI done, so I and others can review. At that point I can review with Nuclear Med and IR and decide if we will proceed with PRRT, embolization/SIRT, oral TKIs, or CAPETEM.

## 2023-09-08 NOTE — PHYSICAL EXAM
[Restricted in physically strenuous activity but ambulatory and able to carry out work of a light or sedentary nature] : Status 1- Restricted in physically strenuous activity but ambulatory and able to carry out work of a light or sedentary nature, e.g., light house work, office work [Obese] : obese [Normal] : affect appropriate [de-identified] : obese, left anterior incision for prior back surgery. Liver is not palpable. [de-identified] : uses cane for balance and weakness [de-identified] : 2+ bilateral ankle edema [de-identified] : thoracic and lumbar spine incision from 4 prior back surgeries with hardware [de-identified] : bilateral anterior knee incisions from prior knee replacements

## 2023-09-08 NOTE — REVIEW OF SYSTEMS
[Negative] : Allergic/Immunologic [FreeTextEntry7] : diarrhea has subsided. [FreeTextEntry9] : weakness and pain in back due to prior surgeries. [de-identified] : ankle swelling

## 2023-09-10 LAB
AFP-TM SERPL-MCNC: 1.9 NG/ML
ALBUMIN SERPL ELPH-MCNC: 4.2 G/DL
ALP BLD-CCNC: 124 U/L
ALT SERPL-CCNC: 25 U/L
ANION GAP SERPL CALC-SCNC: 14 MMOL/L
APTT BLD: 33.4 SEC
AST SERPL-CCNC: 30 U/L
BASOPHILS # BLD AUTO: 0.04 K/UL
BASOPHILS NFR BLD AUTO: 0.6 %
BILIRUB SERPL-MCNC: 0.3 MG/DL
BUN SERPL-MCNC: 29 MG/DL
CALCIUM SERPL-MCNC: 9.6 MG/DL
CANCER AG19-9 SERPL-ACNC: 51 U/ML
CHLORIDE SERPL-SCNC: 102 MMOL/L
CO2 SERPL-SCNC: 26 MMOL/L
CREAT SERPL-MCNC: 1.41 MG/DL
CRP SERPL-MCNC: 14 MG/L
EGFR: 51 ML/MIN/1.73M2
EOSINOPHIL # BLD AUTO: 0.17 K/UL
EOSINOPHIL NFR BLD AUTO: 2.3 %
GLUCOSE SERPL-MCNC: 131 MG/DL
HCT VFR BLD CALC: 42.7 %
HEPB DNA PCR INT: NOT DETECTED
HEPB DNA PCR LOG: NOT DETECTED LOGIU/ML
HGB BLD-MCNC: 13 G/DL
IMM GRANULOCYTES NFR BLD AUTO: 0.3 %
INR PPP: 0.95 RATIO
LDH SERPL-CCNC: 186 U/L
LYMPHOCYTES # BLD AUTO: 1.33 K/UL
LYMPHOCYTES NFR BLD AUTO: 18.4 %
MAN DIFF?: NORMAL
MCHC RBC-ENTMCNC: 29.6 PG
MCHC RBC-ENTMCNC: 30.4 GM/DL
MCV RBC AUTO: 97.3 FL
MONOCYTES # BLD AUTO: 0.64 K/UL
MONOCYTES NFR BLD AUTO: 8.8 %
NEUTROPHILS # BLD AUTO: 5.04 K/UL
NEUTROPHILS NFR BLD AUTO: 69.6 %
PLATELET # BLD AUTO: 203 K/UL
POTASSIUM SERPL-SCNC: 4.6 MMOL/L
PROT SERPL-MCNC: 6.8 G/DL
PT BLD: 10.8 SEC
RBC # BLD: 4.39 M/UL
RBC # FLD: 15.5 %
SODIUM SERPL-SCNC: 142 MMOL/L
URATE SERPL-MCNC: 7.4 MG/DL
WBC # FLD AUTO: 7.24 K/UL

## 2023-09-12 ENCOUNTER — APPOINTMENT (OUTPATIENT)
Dept: NUCLEAR MEDICINE | Facility: IMAGING CENTER | Age: 78
End: 2023-09-12
Payer: MEDICARE

## 2023-09-12 ENCOUNTER — OUTPATIENT (OUTPATIENT)
Dept: OUTPATIENT SERVICES | Facility: HOSPITAL | Age: 78
LOS: 1 days | End: 2023-09-12
Payer: MEDICARE

## 2023-09-12 DIAGNOSIS — Z98.890 OTHER SPECIFIED POSTPROCEDURAL STATES: Chronic | ICD-10-CM

## 2023-09-12 DIAGNOSIS — Z96.653 PRESENCE OF ARTIFICIAL KNEE JOINT, BILATERAL: Chronic | ICD-10-CM

## 2023-09-12 DIAGNOSIS — C7A.8 OTHER MALIGNANT NEUROENDOCRINE TUMORS: ICD-10-CM

## 2023-09-12 DIAGNOSIS — Z96.60 PRESENCE OF UNSPECIFIED ORTHOPEDIC JOINT IMPLANT: Chronic | ICD-10-CM

## 2023-09-12 PROCEDURE — 78815 PET IMAGE W/CT SKULL-THIGH: CPT | Mod: 26,PS,MH

## 2023-09-12 PROCEDURE — 78815 PET IMAGE W/CT SKULL-THIGH: CPT

## 2023-09-12 PROCEDURE — A9592: CPT

## 2023-09-13 LAB — CGA SERPL-MCNC: 188.9 NG/ML

## 2023-09-14 ENCOUNTER — APPOINTMENT (OUTPATIENT)
Dept: NUCLEAR MEDICINE | Facility: HOSPITAL | Age: 78
End: 2023-09-14

## 2023-09-14 VITALS
SYSTOLIC BLOOD PRESSURE: 99 MMHG | TEMPERATURE: 97.6 F | HEART RATE: 76 BPM | DIASTOLIC BLOOD PRESSURE: 67 MMHG | RESPIRATION RATE: 16 BRPM | OXYGEN SATURATION: 97 %

## 2023-09-14 DIAGNOSIS — D3A.8 OTHER BENIGN NEUROENDOCRINE TUMORS: ICD-10-CM

## 2023-09-15 PROBLEM — D3A.8 NEUROENDOCRINE TUMOR: Status: ACTIVE | Noted: 2023-09-15

## 2023-09-18 LAB — SEROTONIN SERUM: 61 NG/ML

## 2023-09-20 ENCOUNTER — APPOINTMENT (OUTPATIENT)
Dept: INTERVENTIONAL RADIOLOGY/VASCULAR | Facility: CLINIC | Age: 78
End: 2023-09-20

## 2023-09-21 ENCOUNTER — NON-APPOINTMENT (OUTPATIENT)
Age: 78
End: 2023-09-21

## 2023-09-21 LAB — PANC POLYPEPT SERPL-MCNC: 2257 PG/ML

## 2023-09-28 ENCOUNTER — APPOINTMENT (OUTPATIENT)
Dept: CARDIOLOGY | Facility: CLINIC | Age: 78
End: 2023-09-28
Payer: MEDICARE

## 2023-09-28 VITALS
WEIGHT: 272 LBS | SYSTOLIC BLOOD PRESSURE: 110 MMHG | RESPIRATION RATE: 15 BRPM | TEMPERATURE: 97.8 F | BODY MASS INDEX: 38.94 KG/M2 | HEART RATE: 81 BPM | OXYGEN SATURATION: 99 % | DIASTOLIC BLOOD PRESSURE: 70 MMHG | HEIGHT: 70 IN

## 2023-09-28 DIAGNOSIS — R21 RASH AND OTHER NONSPECIFIC SKIN ERUPTION: ICD-10-CM

## 2023-09-28 PROCEDURE — 93000 ELECTROCARDIOGRAM COMPLETE: CPT

## 2023-09-28 PROCEDURE — 99214 OFFICE O/P EST MOD 30 MIN: CPT

## 2023-10-04 ENCOUNTER — APPOINTMENT (OUTPATIENT)
Dept: UROLOGY | Facility: CLINIC | Age: 78
End: 2023-10-04
Payer: MEDICARE

## 2023-10-04 VITALS
RESPIRATION RATE: 16 BRPM | HEIGHT: 74 IN | SYSTOLIC BLOOD PRESSURE: 102 MMHG | DIASTOLIC BLOOD PRESSURE: 62 MMHG | TEMPERATURE: 98.8 F | BODY MASS INDEX: 22.07 KG/M2 | HEART RATE: 98 BPM | WEIGHT: 172 LBS

## 2023-10-04 DIAGNOSIS — N21.0 CALCULUS IN BLADDER: ICD-10-CM

## 2023-10-04 PROCEDURE — 99203 OFFICE O/P NEW LOW 30 MIN: CPT

## 2023-10-04 PROCEDURE — 51798 US URINE CAPACITY MEASURE: CPT

## 2023-10-05 LAB
APPEARANCE: CLEAR
BILIRUBIN URINE: NEGATIVE
BLOOD URINE: NEGATIVE
COLOR: YELLOW
GLUCOSE QUALITATIVE U: NEGATIVE MG/DL
KETONES URINE: NEGATIVE MG/DL
LEUKOCYTE ESTERASE URINE: NEGATIVE
NITRITE URINE: NEGATIVE
PH URINE: 6.5
PROTEIN URINE: NEGATIVE MG/DL
SPECIFIC GRAVITY URINE: 1.01
UROBILINOGEN URINE: 0.2 MG/DL

## 2023-10-06 LAB — BACTERIA UR CULT: NORMAL

## 2023-10-09 ENCOUNTER — NON-APPOINTMENT (OUTPATIENT)
Age: 78
End: 2023-10-09

## 2023-10-09 ENCOUNTER — APPOINTMENT (OUTPATIENT)
Dept: WOUND CARE | Facility: HOSPITAL | Age: 78
End: 2023-10-09
Payer: MEDICARE

## 2023-10-09 VITALS
SYSTOLIC BLOOD PRESSURE: 109 MMHG | TEMPERATURE: 97.9 F | OXYGEN SATURATION: 97 % | DIASTOLIC BLOOD PRESSURE: 73 MMHG | HEART RATE: 81 BPM

## 2023-10-09 DIAGNOSIS — T14.90XA INJURY, UNSPECIFIED, INITIAL ENCOUNTER: ICD-10-CM

## 2023-10-09 DIAGNOSIS — R70.0 ELEVATED ERYTHROCYTE SEDIMENTATION RATE: ICD-10-CM

## 2023-10-09 PROCEDURE — 99215 OFFICE O/P EST HI 40 MIN: CPT

## 2023-10-21 ENCOUNTER — RX RENEWAL (OUTPATIENT)
Age: 78
End: 2023-10-21

## 2023-10-23 ENCOUNTER — INPATIENT (INPATIENT)
Facility: HOSPITAL | Age: 78
LOS: 11 days | Discharge: HOME HEALTH | DRG: 435 | End: 2023-11-04
Attending: STUDENT IN AN ORGANIZED HEALTH CARE EDUCATION/TRAINING PROGRAM | Admitting: HOSPITALIST
Payer: MEDICARE

## 2023-10-23 VITALS
DIASTOLIC BLOOD PRESSURE: 70 MMHG | SYSTOLIC BLOOD PRESSURE: 110 MMHG | HEART RATE: 87 BPM | OXYGEN SATURATION: 97 % | WEIGHT: 265 LBS | RESPIRATION RATE: 14 BRPM | TEMPERATURE: 101 F | HEIGHT: 72 IN

## 2023-10-23 DIAGNOSIS — Z98.890 OTHER SPECIFIED POSTPROCEDURAL STATES: Chronic | ICD-10-CM

## 2023-10-23 DIAGNOSIS — R50.9 FEVER, UNSPECIFIED: ICD-10-CM

## 2023-10-23 DIAGNOSIS — Z96.60 PRESENCE OF UNSPECIFIED ORTHOPEDIC JOINT IMPLANT: Chronic | ICD-10-CM

## 2023-10-23 DIAGNOSIS — Z96.653 PRESENCE OF ARTIFICIAL KNEE JOINT, BILATERAL: Chronic | ICD-10-CM

## 2023-10-23 LAB
ALBUMIN SERPL ELPH-MCNC: 3.6 G/DL — SIGNIFICANT CHANGE UP (ref 3.3–5)
ALBUMIN SERPL ELPH-MCNC: 3.6 G/DL — SIGNIFICANT CHANGE UP (ref 3.3–5)
ALP SERPL-CCNC: 155 U/L — HIGH (ref 40–120)
ALP SERPL-CCNC: 155 U/L — HIGH (ref 40–120)
ALT FLD-CCNC: 80 U/L — HIGH (ref 10–45)
ALT FLD-CCNC: 80 U/L — HIGH (ref 10–45)
ANION GAP SERPL CALC-SCNC: 16 MMOL/L — SIGNIFICANT CHANGE UP (ref 5–17)
ANION GAP SERPL CALC-SCNC: 16 MMOL/L — SIGNIFICANT CHANGE UP (ref 5–17)
APPEARANCE UR: CLEAR — SIGNIFICANT CHANGE UP
APPEARANCE UR: CLEAR — SIGNIFICANT CHANGE UP
APTT BLD: 29 SEC — SIGNIFICANT CHANGE UP (ref 24.5–35.6)
APTT BLD: 29 SEC — SIGNIFICANT CHANGE UP (ref 24.5–35.6)
AST SERPL-CCNC: 145 U/L — HIGH (ref 10–40)
AST SERPL-CCNC: 145 U/L — HIGH (ref 10–40)
BACTERIA # UR AUTO: NEGATIVE — SIGNIFICANT CHANGE UP
BACTERIA # UR AUTO: NEGATIVE — SIGNIFICANT CHANGE UP
BASE EXCESS BLDV CALC-SCNC: -3.3 MMOL/L — LOW (ref -2–3)
BASE EXCESS BLDV CALC-SCNC: -3.3 MMOL/L — LOW (ref -2–3)
BASE EXCESS BLDV CALC-SCNC: -6.3 MMOL/L — LOW (ref -2–3)
BASE EXCESS BLDV CALC-SCNC: -6.3 MMOL/L — LOW (ref -2–3)
BASOPHILS # BLD AUTO: 0 K/UL — SIGNIFICANT CHANGE UP (ref 0–0.2)
BASOPHILS # BLD AUTO: 0 K/UL — SIGNIFICANT CHANGE UP (ref 0–0.2)
BASOPHILS NFR BLD AUTO: 0 % — SIGNIFICANT CHANGE UP (ref 0–2)
BASOPHILS NFR BLD AUTO: 0 % — SIGNIFICANT CHANGE UP (ref 0–2)
BILIRUB SERPL-MCNC: 0.6 MG/DL — SIGNIFICANT CHANGE UP (ref 0.2–1.2)
BILIRUB SERPL-MCNC: 0.6 MG/DL — SIGNIFICANT CHANGE UP (ref 0.2–1.2)
BILIRUB UR-MCNC: NEGATIVE — SIGNIFICANT CHANGE UP
BILIRUB UR-MCNC: NEGATIVE — SIGNIFICANT CHANGE UP
BUN SERPL-MCNC: 36 MG/DL — HIGH (ref 7–23)
BUN SERPL-MCNC: 36 MG/DL — HIGH (ref 7–23)
CA-I SERPL-SCNC: 1.21 MMOL/L — SIGNIFICANT CHANGE UP (ref 1.15–1.33)
CALCIUM SERPL-MCNC: 9.8 MG/DL — SIGNIFICANT CHANGE UP (ref 8.4–10.5)
CALCIUM SERPL-MCNC: 9.8 MG/DL — SIGNIFICANT CHANGE UP (ref 8.4–10.5)
CHLORIDE BLDV-SCNC: 105 MMOL/L — SIGNIFICANT CHANGE UP (ref 96–108)
CHLORIDE SERPL-SCNC: 103 MMOL/L — SIGNIFICANT CHANGE UP (ref 96–108)
CHLORIDE SERPL-SCNC: 103 MMOL/L — SIGNIFICANT CHANGE UP (ref 96–108)
CO2 BLDV-SCNC: 20 MMOL/L — LOW (ref 22–26)
CO2 BLDV-SCNC: 20 MMOL/L — LOW (ref 22–26)
CO2 BLDV-SCNC: 23 MMOL/L — SIGNIFICANT CHANGE UP (ref 22–26)
CO2 BLDV-SCNC: 23 MMOL/L — SIGNIFICANT CHANGE UP (ref 22–26)
CO2 SERPL-SCNC: 18 MMOL/L — LOW (ref 22–31)
CO2 SERPL-SCNC: 18 MMOL/L — LOW (ref 22–31)
COLOR SPEC: SIGNIFICANT CHANGE UP
COLOR SPEC: SIGNIFICANT CHANGE UP
CREAT SERPL-MCNC: 1.25 MG/DL — SIGNIFICANT CHANGE UP (ref 0.5–1.3)
CREAT SERPL-MCNC: 1.25 MG/DL — SIGNIFICANT CHANGE UP (ref 0.5–1.3)
DIFF PNL FLD: NEGATIVE — SIGNIFICANT CHANGE UP
DIFF PNL FLD: NEGATIVE — SIGNIFICANT CHANGE UP
EGFR: 59 ML/MIN/1.73M2 — LOW
EGFR: 59 ML/MIN/1.73M2 — LOW
EOSINOPHIL # BLD AUTO: 0 K/UL — SIGNIFICANT CHANGE UP (ref 0–0.5)
EOSINOPHIL # BLD AUTO: 0 K/UL — SIGNIFICANT CHANGE UP (ref 0–0.5)
EOSINOPHIL NFR BLD AUTO: 0 % — SIGNIFICANT CHANGE UP (ref 0–6)
EOSINOPHIL NFR BLD AUTO: 0 % — SIGNIFICANT CHANGE UP (ref 0–6)
EPI CELLS # UR: 1 /HPF — SIGNIFICANT CHANGE UP
EPI CELLS # UR: 1 /HPF — SIGNIFICANT CHANGE UP
FLUAV AG NPH QL: SIGNIFICANT CHANGE UP
FLUAV AG NPH QL: SIGNIFICANT CHANGE UP
FLUBV AG NPH QL: SIGNIFICANT CHANGE UP
FLUBV AG NPH QL: SIGNIFICANT CHANGE UP
GAS PNL BLDV: 133 MMOL/L — LOW (ref 136–145)
GAS PNL BLDV: SIGNIFICANT CHANGE UP
GLUCOSE BLDV-MCNC: 180 MG/DL — HIGH (ref 70–99)
GLUCOSE BLDV-MCNC: 180 MG/DL — HIGH (ref 70–99)
GLUCOSE BLDV-MCNC: 189 MG/DL — HIGH (ref 70–99)
GLUCOSE BLDV-MCNC: 189 MG/DL — HIGH (ref 70–99)
GLUCOSE SERPL-MCNC: 178 MG/DL — HIGH (ref 70–99)
GLUCOSE SERPL-MCNC: 178 MG/DL — HIGH (ref 70–99)
GLUCOSE UR QL: NEGATIVE — SIGNIFICANT CHANGE UP
GLUCOSE UR QL: NEGATIVE — SIGNIFICANT CHANGE UP
HCO3 BLDV-SCNC: 19 MMOL/L — LOW (ref 22–29)
HCO3 BLDV-SCNC: 19 MMOL/L — LOW (ref 22–29)
HCO3 BLDV-SCNC: 22 MMOL/L — SIGNIFICANT CHANGE UP (ref 22–29)
HCO3 BLDV-SCNC: 22 MMOL/L — SIGNIFICANT CHANGE UP (ref 22–29)
HCT VFR BLD CALC: 39.4 % — SIGNIFICANT CHANGE UP (ref 39–50)
HCT VFR BLD CALC: 39.4 % — SIGNIFICANT CHANGE UP (ref 39–50)
HCT VFR BLDA CALC: 36 % — LOW (ref 39–51)
HCT VFR BLDA CALC: 36 % — LOW (ref 39–51)
HCT VFR BLDA CALC: 40 % — SIGNIFICANT CHANGE UP (ref 39–51)
HCT VFR BLDA CALC: 40 % — SIGNIFICANT CHANGE UP (ref 39–51)
HGB BLD CALC-MCNC: 11.9 G/DL — LOW (ref 12.6–17.4)
HGB BLD CALC-MCNC: 11.9 G/DL — LOW (ref 12.6–17.4)
HGB BLD CALC-MCNC: 13.4 G/DL — SIGNIFICANT CHANGE UP (ref 12.6–17.4)
HGB BLD CALC-MCNC: 13.4 G/DL — SIGNIFICANT CHANGE UP (ref 12.6–17.4)
HGB BLD-MCNC: 12.8 G/DL — LOW (ref 13–17)
HGB BLD-MCNC: 12.8 G/DL — LOW (ref 13–17)
HYALINE CASTS # UR AUTO: 6 /LPF — HIGH (ref 0–2)
HYALINE CASTS # UR AUTO: 6 /LPF — HIGH (ref 0–2)
INR BLD: 1.22 RATIO — HIGH (ref 0.85–1.18)
INR BLD: 1.22 RATIO — HIGH (ref 0.85–1.18)
KETONES UR-MCNC: NEGATIVE — SIGNIFICANT CHANGE UP
KETONES UR-MCNC: NEGATIVE — SIGNIFICANT CHANGE UP
LACTATE BLDV-MCNC: 1.3 MMOL/L — SIGNIFICANT CHANGE UP (ref 0.5–2)
LACTATE BLDV-MCNC: 1.3 MMOL/L — SIGNIFICANT CHANGE UP (ref 0.5–2)
LACTATE BLDV-MCNC: 1.8 MMOL/L — SIGNIFICANT CHANGE UP (ref 0.5–2)
LACTATE BLDV-MCNC: 1.8 MMOL/L — SIGNIFICANT CHANGE UP (ref 0.5–2)
LEUKOCYTE ESTERASE UR-ACNC: NEGATIVE — SIGNIFICANT CHANGE UP
LEUKOCYTE ESTERASE UR-ACNC: NEGATIVE — SIGNIFICANT CHANGE UP
LIDOCAIN IGE QN: 119 U/L — HIGH (ref 7–60)
LIDOCAIN IGE QN: 119 U/L — HIGH (ref 7–60)
LYMPHOCYTES # BLD AUTO: 0.48 K/UL — LOW (ref 1–3.3)
LYMPHOCYTES # BLD AUTO: 0.48 K/UL — LOW (ref 1–3.3)
LYMPHOCYTES # BLD AUTO: 3.5 % — LOW (ref 13–44)
LYMPHOCYTES # BLD AUTO: 3.5 % — LOW (ref 13–44)
MANUAL SMEAR VERIFICATION: SIGNIFICANT CHANGE UP
MANUAL SMEAR VERIFICATION: SIGNIFICANT CHANGE UP
MCHC RBC-ENTMCNC: 30 PG — SIGNIFICANT CHANGE UP (ref 27–34)
MCHC RBC-ENTMCNC: 30 PG — SIGNIFICANT CHANGE UP (ref 27–34)
MCHC RBC-ENTMCNC: 32.5 GM/DL — SIGNIFICANT CHANGE UP (ref 32–36)
MCHC RBC-ENTMCNC: 32.5 GM/DL — SIGNIFICANT CHANGE UP (ref 32–36)
MCV RBC AUTO: 92.5 FL — SIGNIFICANT CHANGE UP (ref 80–100)
MCV RBC AUTO: 92.5 FL — SIGNIFICANT CHANGE UP (ref 80–100)
MONOCYTES # BLD AUTO: 1.42 K/UL — HIGH (ref 0–0.9)
MONOCYTES # BLD AUTO: 1.42 K/UL — HIGH (ref 0–0.9)
MONOCYTES NFR BLD AUTO: 10.4 % — SIGNIFICANT CHANGE UP (ref 2–14)
MONOCYTES NFR BLD AUTO: 10.4 % — SIGNIFICANT CHANGE UP (ref 2–14)
NEUTROPHILS # BLD AUTO: 11.73 K/UL — HIGH (ref 1.8–7.4)
NEUTROPHILS # BLD AUTO: 11.73 K/UL — HIGH (ref 1.8–7.4)
NEUTROPHILS NFR BLD AUTO: 86.1 % — HIGH (ref 43–77)
NEUTROPHILS NFR BLD AUTO: 86.1 % — HIGH (ref 43–77)
NITRITE UR-MCNC: NEGATIVE — SIGNIFICANT CHANGE UP
NITRITE UR-MCNC: NEGATIVE — SIGNIFICANT CHANGE UP
PCO2 BLDV: 36 MMHG — LOW (ref 42–55)
PCO2 BLDV: 36 MMHG — LOW (ref 42–55)
PCO2 BLDV: 40 MMHG — LOW (ref 42–55)
PCO2 BLDV: 40 MMHG — LOW (ref 42–55)
PH BLDV: 7.33 — SIGNIFICANT CHANGE UP (ref 7.32–7.43)
PH BLDV: 7.33 — SIGNIFICANT CHANGE UP (ref 7.32–7.43)
PH BLDV: 7.35 — SIGNIFICANT CHANGE UP (ref 7.32–7.43)
PH BLDV: 7.35 — SIGNIFICANT CHANGE UP (ref 7.32–7.43)
PH UR: 5 — SIGNIFICANT CHANGE UP (ref 5–8)
PH UR: 5 — SIGNIFICANT CHANGE UP (ref 5–8)
PLAT MORPH BLD: NORMAL — SIGNIFICANT CHANGE UP
PLAT MORPH BLD: NORMAL — SIGNIFICANT CHANGE UP
PLATELET # BLD AUTO: 302 K/UL — SIGNIFICANT CHANGE UP (ref 150–400)
PLATELET # BLD AUTO: 302 K/UL — SIGNIFICANT CHANGE UP (ref 150–400)
PO2 BLDV: 18 MMHG — LOW (ref 25–45)
PO2 BLDV: 18 MMHG — LOW (ref 25–45)
PO2 BLDV: 45 MMHG — SIGNIFICANT CHANGE UP (ref 25–45)
PO2 BLDV: 45 MMHG — SIGNIFICANT CHANGE UP (ref 25–45)
POTASSIUM BLDV-SCNC: 4.5 MMOL/L — SIGNIFICANT CHANGE UP (ref 3.5–5.1)
POTASSIUM BLDV-SCNC: 4.5 MMOL/L — SIGNIFICANT CHANGE UP (ref 3.5–5.1)
POTASSIUM BLDV-SCNC: 5 MMOL/L — SIGNIFICANT CHANGE UP (ref 3.5–5.1)
POTASSIUM BLDV-SCNC: 5 MMOL/L — SIGNIFICANT CHANGE UP (ref 3.5–5.1)
POTASSIUM SERPL-MCNC: 4.9 MMOL/L — SIGNIFICANT CHANGE UP (ref 3.5–5.3)
POTASSIUM SERPL-MCNC: 4.9 MMOL/L — SIGNIFICANT CHANGE UP (ref 3.5–5.3)
POTASSIUM SERPL-SCNC: 4.9 MMOL/L — SIGNIFICANT CHANGE UP (ref 3.5–5.3)
POTASSIUM SERPL-SCNC: 4.9 MMOL/L — SIGNIFICANT CHANGE UP (ref 3.5–5.3)
PROT SERPL-MCNC: 7.1 G/DL — SIGNIFICANT CHANGE UP (ref 6–8.3)
PROT SERPL-MCNC: 7.1 G/DL — SIGNIFICANT CHANGE UP (ref 6–8.3)
PROT UR-MCNC: SIGNIFICANT CHANGE UP
PROT UR-MCNC: SIGNIFICANT CHANGE UP
PROTHROM AB SERPL-ACNC: 13.3 SEC — HIGH (ref 9.5–13)
PROTHROM AB SERPL-ACNC: 13.3 SEC — HIGH (ref 9.5–13)
RBC # BLD: 4.26 M/UL — SIGNIFICANT CHANGE UP (ref 4.2–5.8)
RBC # BLD: 4.26 M/UL — SIGNIFICANT CHANGE UP (ref 4.2–5.8)
RBC # FLD: 14.6 % — HIGH (ref 10.3–14.5)
RBC # FLD: 14.6 % — HIGH (ref 10.3–14.5)
RBC BLD AUTO: SIGNIFICANT CHANGE UP
RBC BLD AUTO: SIGNIFICANT CHANGE UP
RBC CASTS # UR COMP ASSIST: 2 /HPF — SIGNIFICANT CHANGE UP (ref 0–4)
RBC CASTS # UR COMP ASSIST: 2 /HPF — SIGNIFICANT CHANGE UP (ref 0–4)
RSV RNA NPH QL NAA+NON-PROBE: SIGNIFICANT CHANGE UP
RSV RNA NPH QL NAA+NON-PROBE: SIGNIFICANT CHANGE UP
SAO2 % BLDV: 20.5 % — LOW (ref 67–88)
SAO2 % BLDV: 20.5 % — LOW (ref 67–88)
SAO2 % BLDV: 74.1 % — SIGNIFICANT CHANGE UP (ref 67–88)
SAO2 % BLDV: 74.1 % — SIGNIFICANT CHANGE UP (ref 67–88)
SARS-COV-2 RNA SPEC QL NAA+PROBE: SIGNIFICANT CHANGE UP
SARS-COV-2 RNA SPEC QL NAA+PROBE: SIGNIFICANT CHANGE UP
SODIUM SERPL-SCNC: 137 MMOL/L — SIGNIFICANT CHANGE UP (ref 135–145)
SODIUM SERPL-SCNC: 137 MMOL/L — SIGNIFICANT CHANGE UP (ref 135–145)
SP GR SPEC: 1.02 — SIGNIFICANT CHANGE UP (ref 1.01–1.02)
SP GR SPEC: 1.02 — SIGNIFICANT CHANGE UP (ref 1.01–1.02)
TROPONIN T, HIGH SENSITIVITY RESULT: 73 NG/L — HIGH (ref 0–51)
TROPONIN T, HIGH SENSITIVITY RESULT: 73 NG/L — HIGH (ref 0–51)
TROPONIN T, HIGH SENSITIVITY RESULT: 82 NG/L — HIGH (ref 0–51)
TROPONIN T, HIGH SENSITIVITY RESULT: 82 NG/L — HIGH (ref 0–51)
UROBILINOGEN FLD QL: NEGATIVE — SIGNIFICANT CHANGE UP
UROBILINOGEN FLD QL: NEGATIVE — SIGNIFICANT CHANGE UP
WBC # BLD: 13.62 K/UL — HIGH (ref 3.8–10.5)
WBC # BLD: 13.62 K/UL — HIGH (ref 3.8–10.5)
WBC # FLD AUTO: 13.62 K/UL — HIGH (ref 3.8–10.5)
WBC # FLD AUTO: 13.62 K/UL — HIGH (ref 3.8–10.5)
WBC UR QL: 2 /HPF — SIGNIFICANT CHANGE UP (ref 0–5)
WBC UR QL: 2 /HPF — SIGNIFICANT CHANGE UP (ref 0–5)

## 2023-10-23 PROCEDURE — 99285 EMERGENCY DEPT VISIT HI MDM: CPT

## 2023-10-23 PROCEDURE — 74177 CT ABD & PELVIS W/CONTRAST: CPT | Mod: 26,MA

## 2023-10-23 PROCEDURE — 71275 CT ANGIOGRAPHY CHEST: CPT | Mod: 26,MA

## 2023-10-23 PROCEDURE — 99223 1ST HOSP IP/OBS HIGH 75: CPT

## 2023-10-23 PROCEDURE — 71045 X-RAY EXAM CHEST 1 VIEW: CPT | Mod: 26

## 2023-10-23 RX ORDER — ACETAMINOPHEN 500 MG
1000 TABLET ORAL ONCE
Refills: 0 | Status: COMPLETED | OUTPATIENT
Start: 2023-10-23 | End: 2023-10-23

## 2023-10-23 RX ORDER — CEFEPIME 1 G/1
1000 INJECTION, POWDER, FOR SOLUTION INTRAMUSCULAR; INTRAVENOUS ONCE
Refills: 0 | Status: COMPLETED | OUTPATIENT
Start: 2023-10-23 | End: 2023-10-23

## 2023-10-23 RX ORDER — SODIUM CHLORIDE 9 MG/ML
500 INJECTION INTRAMUSCULAR; INTRAVENOUS; SUBCUTANEOUS ONCE
Refills: 0 | Status: COMPLETED | OUTPATIENT
Start: 2023-10-23 | End: 2023-10-23

## 2023-10-23 RX ORDER — ONDANSETRON 8 MG/1
4 TABLET, FILM COATED ORAL ONCE
Refills: 0 | Status: COMPLETED | OUTPATIENT
Start: 2023-10-23 | End: 2023-10-23

## 2023-10-23 RX ADMIN — CEFEPIME 1000 MILLIGRAM(S): 1 INJECTION, POWDER, FOR SOLUTION INTRAMUSCULAR; INTRAVENOUS at 20:41

## 2023-10-23 RX ADMIN — Medication 400 MILLIGRAM(S): at 17:51

## 2023-10-23 RX ADMIN — Medication 1000 MILLIGRAM(S): at 20:41

## 2023-10-23 RX ADMIN — ONDANSETRON 4 MILLIGRAM(S): 8 TABLET, FILM COATED ORAL at 19:57

## 2023-10-23 RX ADMIN — SODIUM CHLORIDE 500 MILLILITER(S): 9 INJECTION INTRAMUSCULAR; INTRAVENOUS; SUBCUTANEOUS at 19:18

## 2023-10-23 RX ADMIN — SODIUM CHLORIDE 500 MILLILITER(S): 9 INJECTION INTRAMUSCULAR; INTRAVENOUS; SUBCUTANEOUS at 20:41

## 2023-10-23 RX ADMIN — CEFEPIME 100 MILLIGRAM(S): 1 INJECTION, POWDER, FOR SOLUTION INTRAMUSCULAR; INTRAVENOUS at 18:09

## 2023-10-23 NOTE — H&P ADULT - PROBLEM SELECTOR PLAN 1
Met SIRS criteria. Febrile to 100.7, tachycardic, leukocytosis. No clear source of infection at this time.  - Labs reviewed, lactate normal 1.3, leukocytosis with neutrophil predominance  - S/p cefepime in ED  - Blood and urine cultures pending  - UA unremarkable, negative for UTI  - CT imaging reviewed - without obvious source of infection and no PE, but did show worsening liver mets  - Unclear source of infection, if any. However given recent initiation of cancer treatment agent, will treat empirically with ceftriaxone for now.  - Monitor fever curve, can broaden to cefepime if continues to spike fevers and/or worsening leukocytosis.

## 2023-10-23 NOTE — H&P ADULT - HISTORY OF PRESENT ILLNESS
78 M with hx of CAD s/p NURIA to distal LAD in 6/2023, HTN, EtOH disorder, T2DM, spinal surgeries, neuroendocrine tumor on radiation treatment, presenting with chest pain.  78 M with hx of CAD s/p NURIA to distal LAD in 6/2023, HTN, HLD, EtOH disorder, T2DM, spinal surgeries, neuroendocrine tumor on Lutathera (first treatment on Friday 10/20 at [facility] presenting with fatigue and chest pain since Friday. Patient reports severe fatigue starting Friday after his first treatment with Lutathera. Pt also reported mid-left lower chest pain described as pressure. No radiation, no aggravating or alleviating factors. Pt has never felt this type of chest pain before. Denies palpitations, diaphoresis, dyspnea, nausea, vomiting. Pt has diffuse abdominal pain. Also with constipation; last BM several days ago. No other acute complaints. In ED, patient developed fever with Tmax 100.7. CT imaging negative for PE but showed interval worsening of metastatic hepatic lesions. UA unremarkable. Troponin 73 --> 82, EKG showing sinus tachycardia with PACs. Pt given cefepime in ED.

## 2023-10-23 NOTE — H&P ADULT - PROBLEM SELECTOR PLAN 3
On Metformin 1000mg BID. A1c 7.5 in 6/2023  - Start low insulin sliding scale  - FS before meals and at bedtime.   - CC diet

## 2023-10-23 NOTE — H&P ADULT - PROBLEM SELECTOR PLAN 5
On treatment with Lutathera s/p first treatment on 10/20. Pt to get treatment every 2 months for total of 4 doses.   - CT with worsening liver mets.  - Noted to have elevated LFTs on CMP. May be due to Lutathera which has been shown to cause hepatoxicity.  - Manage symptoms supportively for now.

## 2023-10-23 NOTE — H&P ADULT - PROBLEM SELECTOR PLAN 2
Hx of chronic stable angina Chest pain atypical but patient with know cardiac risk factors and has prior history of CAD with stent placement. Current pain is different from prior chest pains.   - Troponin 73 --> 82. Will repeat another troponin level in AM. Pt still with chest discomfort.  - EKG personally reviewed showing tachycardia HR to 102, occasional PACs, qtc 427. TWI in III and avf.   - Symptoms started after treatment with Lutathera, raising suspicion for medication-related adverse effect?  - Tylenol prn for pain control  - Monitor n telemetry

## 2023-10-23 NOTE — H&P ADULT - PROBLEM SELECTOR PLAN 4
LHC 6/23/23: 90% stenosis in distal LAD, DESx1 to distal LAD  TTE 6/2023: EF 50-55% with grade 2 diastolic dysfunction.   - Continue ASA  - Continue Plavix  - Continue Crestor  - Continue metoprolol succinate

## 2023-10-23 NOTE — ED ADULT NURSE NOTE - OBJECTIVE STATEMENT
Pt is 78y M with PMH neuroendocrine liver carcinoma on PRRT, first treatment Friday, HTN, PAD, PSH stents complaining of chest pain, SOB, fatigue. Pt reports onset of dull, constant sternal chest pain with associated SOB and fatigue since Friday after radiation treatment. Denies fevers, lightheadedness, n/v. Upon assessment, A&Ox4, endorses 5/10 chest pain and fatigue, SpO2 100% on RA, chest pain non-radiating, cardiac monitor HR 100s NSR with PVCs. Family at bedside.

## 2023-10-23 NOTE — ED ADULT TRIAGE NOTE - NS ED NURSE BANDS TYPE
O[facility] 45 Transitions Initial Follow Up Call    Call within 2 business days of discharge: Yes    Patient: Rudi Navas Patient : 1940   MRN: <[id number]>  Reason for Admission: There are no discharge diagnoses documented for the most recent discharge. Discharge Date: 18 RARS: [facility] Risk Score: 6.25         Facility: [address] [place] [address],[address] Transitions 24 Hour Call    Care Transitions Interventions       Attempted to contact Pt for transitions call attempt #2. Contact information left to  requesting call back at the earliest convenience.     Riley Cortés RN BSN   Care Transitions Coordinator  [phone]       Follow Up  Future Appointments  Date Time Provider Carlyle Finchi   2018 8:00 AM MD BAK[facility]I [facility]       Riley Cortés RN
[facility] Transitions Follow Up Call    2018    Patient: Alexandrea Gibson  Patient : 1940   MRN: <[id number]>  Reason for Admission: There are no discharge diagnoses documented for the most recent discharge. Discharge Date: 18 RARS: Risk Score: 6.25         Care Transitions Subsequent and Final Call    Subsequent and Final Calls  Do you have any ongoing symptoms?:  No  Have your medications changed?:  Yes  Patient Reports:  stop bisoprolol 10 mg tabs and start Lopressor 25 mg bid  Do you have any questions related to your medications?:  Yes  Patient Reports:  Pt concerned about side effects listed for new medication Lopressor  Do you currently have any active services?:  No  Do you have any needs or concerns that I can assist you with?:  Yes  Patient-reported Needs or Concerns:  concerned about new medication side effects. Advised her to take as ordered and f/u with PCP and to contact PCP with any side effect from new med  Identified Barriers:  None  Care Transitions Interventions  Other Interventions:          Received VM from Izabel Villalobos stating that she did start new BP medication Lopressor 25 MG per order and last night @ 10 pm she woke up and had projectile vomiting after taking medication. Pt thinks it may be attributed to Lopressor and is wondering what to do. CTC called pt back and left VM advising her to contact PCP to advise. Will continue to follow for transitions.     Virgil Rangel RN BSN   Care Transitions Coordinator  [phone]     Follow Up  Future Appointments  Date Time Provider Carlyle Parmar   2018 11:30 AM Tera Del Rosario MD [facility]       Virgil Rangel RN
Name band;

## 2023-10-23 NOTE — ED PROVIDER NOTE - PROGRESS NOTE DETAILS
Trey Gomez MD, PGY2  Pt signed out to me. CT showing worsening metastatic disease. No obvious source of infection. On radiation but no chemo. TBA for further infectious workup. Endorsed to hospitalist. Pt in agreement with plan.

## 2023-10-23 NOTE — H&P ADULT - NSHPPHYSICALEXAM_GEN_ALL_CORE
Vital Signs Last 24 Hrs  T(C): 36.6 (23 Oct 2023 22:51), Max: 38.2 (23 Oct 2023 16:06)  T(F): 97.9 (23 Oct 2023 22:51), Max: 100.7 (23 Oct 2023 16:06)  HR: 100 (23 Oct 2023 22:51) (87 - 118)  BP: 113/66 (23 Oct 2023 22:51) (110/70 - 124/63)  BP(mean): --  RR: 18 (23 Oct 2023 22:51) (14 - 19)  SpO2: 98% (23 Oct 2023 22:51) (97% - 98%)    Parameters below as of 23 Oct 2023 22:51  Patient On (Oxygen Delivery Method): room air        CONSTITUTIONAL: Well-groomed, in no apparent distress  EYES: No conjunctival or scleral injection, non-icteric; PERRLA and symmetric  ENMT: No external nasal lesions; nasal mucosa not inflamed; normal dentition; no pharyngeal injection or exudates, oral mucosa with moist membranes  RESPIRATORY: Breathing comfortably; lungs CTA without wheeze/rhonchi/rales  CARDIOVASCULAR: +S1S2, RRR, no M/G/R; no carotid bruits; pedal pulses full and symmetric; no lower extremity edema  GASTROINTESTINAL: No palpable masses or tenderness, +BS throughout, no rebound/guarding; no hepatosplenomegaly; no hernia palpated  GENITOURINARY:  LYMPHATIC: No cervical LAD or tenderness; no axillary LAD or tenderness; no inguinal LAD or tenderness  MUSCULOSKELETAL: Normal gait and station; no digital clubbing or cyanosis; no paraspinal tenderness; no malalignment of extremities  SKIN: No rashes or ulcers noted; no subcutaneous nodules or induration palpable  NEUROLOGIC: CN grossly intact; sensation intact in LEs b/l to light touch; normal strength and tone  PSYCHIATRIC: A+O x 3; mood and affect appropriate; appropriate insight and judgment Vital Signs Last 24 Hrs  T(C): 36.6 (23 Oct 2023 22:51), Max: 38.2 (23 Oct 2023 16:06)  T(F): 97.9 (23 Oct 2023 22:51), Max: 100.7 (23 Oct 2023 16:06)  HR: 100 (23 Oct 2023 22:51) (87 - 118)  BP: 113/66 (23 Oct 2023 22:51) (110/70 - 124/63)  BP(mean): --  RR: 18 (23 Oct 2023 22:51) (14 - 19)  SpO2: 98% (23 Oct 2023 22:51) (97% - 98%)    Parameters below as of 23 Oct 2023 22:51  Patient On (Oxygen Delivery Method): room air        CONSTITUTIONAL: Well-groomed, in no apparent distress  EYES: No conjunctival or scleral injection, non-icteric; PERRLA and symmetric  ENMT: No external nasal lesions; nasal mucosa not inflamed; no pharyngeal injection or exudates, oral mucosa with moist membranes  RESPIRATORY: Breathing comfortably; lungs CTA without wheeze/rhonchi/rales  CARDIOVASCULAR: Tachycardic, irregular rhythm, +S1S2, no M/G/R; pedal pulses full and symmetric; trace lower extremity edema  GASTROINTESTINAL: Mild diffuse tenderness, No palpable masses, +BS throughout, no rebound/guarding; no hepatosplenomegaly; no hernia palpated  LYMPHATIC: No cervical LAD or tenderness  MUSCULOSKELETAL: No malalignment of extremities; no joint swelling or tenderness  SKIN: No rashes or ulcers noted; no subcutaneous nodules or induration palpable  NEUROLOGIC: CN grossly intact; sensation intact in LEs b/l to light touch; normal strength and tone  PSYCHIATRIC: A+O x 3; mood and affect appropriate; appropriate insight and judgment Vital Signs Last 24 Hrs  T(C): 36.6 (23 Oct 2023 22:51), Max: 38.2 (23 Oct 2023 16:06)  T(F): 97.9 (23 Oct 2023 22:51), Max: 100.7 (23 Oct 2023 16:06)  HR: 100 (23 Oct 2023 22:51) (87 - 118)  BP: 113/66 (23 Oct 2023 22:51) (110/70 - 124/63)  BP(mean): --  RR: 18 (23 Oct 2023 22:51) (14 - 19)  SpO2: 98% (23 Oct 2023 22:51) (97% - 98%)    Parameters below as of 23 Oct 2023 22:51  Patient On (Oxygen Delivery Method): room air        CONSTITUTIONAL: Well-groomed, in no apparent distress  EYES: No conjunctival or scleral injection, non-icteric; PERRLA and symmetric  ENMT: No external nasal lesions; nasal mucosa not inflamed; no pharyngeal injection or exudates, oral mucosa with moist membranes  RESPIRATORY: Breathing comfortably; lungs CTA without wheeze/rhonchi/rales  CARDIOVASCULAR: Tachycardic, irregular rhythm, +S1S2, no M/G/R; pedal pulses full and symmetric; trace lower extremity edema; lower chest pain reproducible with palpation.  GASTROINTESTINAL: Mild diffuse tenderness, No palpable masses, +BS throughout, no rebound/guarding; no hepatosplenomegaly; no hernia palpated  LYMPHATIC: No cervical LAD or tenderness  MUSCULOSKELETAL: No malalignment of extremities; no joint swelling or tenderness  SKIN: No rashes or ulcers noted; no subcutaneous nodules or induration palpable  NEUROLOGIC: CN grossly intact; sensation intact in LEs b/l to light touch; normal strength and tone  PSYCHIATRIC: A+O x 3; mood and affect appropriate; appropriate insight and judgment

## 2023-10-23 NOTE — H&P ADULT - ASSESSMENT
78 M with hx of CAD s/p NURIA to distal LAD in 6/2023, HTN, HLD, EtOH disorder, T2DM, spinal surgeries, neuroendocrine tumor on Lutathera (first treatment on Friday 10/20 at [facility]), presenting with fatigue and chest pain. Pt also met SIRS criteria in ED. Pt admitted for further management.

## 2023-10-23 NOTE — PATIENT PROFILE ADULT - FALL HARM RISK - HARM RISK INTERVENTIONS

## 2023-10-23 NOTE — ED PROVIDER NOTE - CLINICAL SUMMARY MEDICAL DECISION MAKING FREE TEXT BOX
HPI:  78-year-old male with past medical history hypertension, hyperlipidemia, EtOH use disorder, multiple spinal surgeries, neuroendocrine tumor, presenting with chest pain has been going on since Friday.  Patient describes chest pain as tightness mid sternal.  With associated dyspnea.  Denies nausea, vomiting, lightheadedness.  Denies any pleurisy to the pain.    ROS:  Negative except as noted in HPI    Physical exam:  Const: not in acute distress  Eyes: no conjunctival injection  HEENT: Head NCAT, Moist MM.  Neck: Trachea midline.   CVS: +S1/S2, Peripheral pulses 2+ and equal in all extremities.  RESP: Unlabored respiratory effort. Clear to auscultation bilaterally.  GI: Diffusely Tender/Nondistended, No CVA tenderness b/l.   MSK: Normocephalic/Atraumatic, No Lower Extremities edema b/l.   Skin: Intact.   Neuro: Motor & Sensation grossly intact.  Psych: Awake, Alert, & Cooperative    MDM:  78-year-old male with past medical history hypertension, hyperlipidemia, EtOH use disorder, multiple spinal surgeries, neuroendocrine tumor, presenting with chest pain has been going on since Friday.  Hemodynamically stable. Physical exam heart irregular rate, lungs clear to auscultation, abdomen soft, diffusely tender. The differential diagnosis includes but is not limited to ACS, PE, pancreatitis, metastatic disease. Will obtain labs, trop, bnp, CXR, CT chest/AP.

## 2023-10-23 NOTE — ED PROVIDER NOTE - ATTENDING CONTRIBUTION TO CARE
Attending MD GILMA Singleton I performed a history and physical exam of the patient and discussed their management with the resident. I reviewed the resident's note and agree with the documented findings and plan of care, except as noted. My medical decision making and observations are as follows:    78 M with PMH HTN, HLD, PAD, neuroendocrine cancer of liver, alcohol abuse, multiple spinal surgeries presenting with intermittent chest tightness since Friday with associated shortness of breath.  Fever, cough, abdominal pain, GI symptoms, dysuria.  On exam, patient no acute distress, mucous membranes moist, heart and lungs clear to auscultation, abdomen soft diffusely tender, no pedal edema.  Incidentally febrile in ED, otherwise VSS. Will obtain labs, imaging to evaluate for chest pain, dyspnea, abdominal tenderness, and fever including ACS, PE, pancreatitis, cholecystitis.  EKG shows sinus arrhythmia, rate 102.  Normal axis, no ischemic changes.

## 2023-10-23 NOTE — ED PROVIDER NOTE - RAPID ASSESSMENT
78-year-old gentleman CAD with stent placed in June being treated for neuroendocrine cancer of the liver with PRRT treatment on Friday presents to the emergency department for the evaluation of severe fatigue and dyspnea intermittently since Friday.  Also having epigastric discomfort.  No fever at home however temperature in triage 100.7 no cough or congestion no sick contacts.

## 2023-10-23 NOTE — H&P ADULT - NSHPREVIEWOFSYSTEMS_GEN_ALL_CORE
Review of Systems:   CONSTITUTIONAL: No fever, weight loss  EYES: No eye pain, visual disturbances, or discharge  ENMT:  No difficulty hearing, tinnitus, vertigo; No sinus or throat pain  RESPIRATORY: No SOB. No cough, wheezing, chills or hemoptysis  CARDIOVASCULAR: No chest pain, palpitations, dizziness, or leg swelling  GASTROINTESTINAL: No abdominal or epigastric pain. No nausea, vomiting, or hematemesis; No diarrhea or constipation. No melena or hematochezia.  GENITOURINARY: No dysuria, frequency, hematuria, or incontinence  NEUROLOGICAL: No headaches, memory loss, loss of strength, numbness, or tremors  SKIN: No itching, burning, rashes, or lesions   LYMPH NODES: No enlarged glands  ENDOCRINE: No heat or cold intolerance; No hair loss  MUSCULOSKELETAL: No joint pain or swelling; No muscle, back pain  PSYCHIATRIC: No depression, anxiety, mood swings, or difficulty sleeping  HEME/LYMPH: No easy bruising, or bleeding gums Review of Systems:   CONSTITUTIONAL: No fever, weight loss, + fatigue  EYES: No eye pain, visual disturbances, or discharge  ENMT:  No difficulty hearing, tinnitus, vertigo; No sinus or throat pain  RESPIRATORY: No SOB. No cough, wheezing, chills or hemoptysis  CARDIOVASCULAR: +chest pain, No palpitations or dizziness, + leg swelling  GASTROINTESTINAL: +abdominal pain. No nausea, vomiting, or hematemesis; No diarrhea. + constipation. No melena or hematochezia.  GENITOURINARY: No dysuria, frequency, hematuria, or incontinence  NEUROLOGICAL: No headaches, memory loss, loss of strength, numbness, or tremors  SKIN: No itching, burning, rashes, or lesions   MUSCULOSKELETAL: No joint pain or swelling; No muscle, back pain, + LE pain  HEME/LYMPH: No easy bruising, or bleeding gums

## 2023-10-23 NOTE — H&P ADULT - NSHPLABSRESULTS_GEN_ALL_CORE
10-23    137  |  103  |  36<H>  ----------------------------<  178<H>  4.9   |  18<L>  |  1.25    Ca    9.8      23 Oct 2023 16:47    TPro  7.1  /  Alb  3.6  /  TBili  0.6  /  DBili  x   /  AST  145<H>  /  ALT  80<H>  /  AlkPhos  155<H>  10-23          PT/INR - ( 23 Oct 2023 16:47 )   PT: 13.3 sec;   INR: 1.22 ratio         PTT - ( 23 Oct 2023 16:47 )  PTT:29.0 sec              Urinalysis Basic - ( 23 Oct 2023 20:12 )    Color: Light Yellow / Appearance: Clear / S.024 / pH: x  Gluc: x / Ketone: Negative  / Bili: Negative / Urobili: Negative   Blood: x / Protein: Trace / Nitrite: Negative   Leuk Esterase: Negative / RBC: 2 /hpf / WBC 2 /HPF   Sq Epi: x / Non Sq Epi: x / Bacteria: Negative                              12.8   13.62 )-----------( 302      ( 23 Oct 2023 16:47 )             39.4     CAPILLARY BLOOD GLUCOSE 10-23    137  |  103  |  36<H>  ----------------------------<  178<H>  4.9   |  18<L>  |  1.25    Ca    9.8      23 Oct 2023 16:47    TPro  7.1  /  Alb  3.6  /  TBili  0.6  /  DBili  x   /  AST  145<H>  /  ALT  80<H>  /  AlkPhos  155<H>  10-23      PT/INR - ( 23 Oct 2023 16:47 )   PT: 13.3 sec;   INR: 1.22 ratio         PTT - ( 23 Oct 2023 16:47 )  PTT:29.0 sec              Urinalysis Basic - ( 23 Oct 2023 20:12 )    Color: Light Yellow / Appearance: Clear / S.024 / pH: x  Gluc: x / Ketone: Negative  / Bili: Negative / Urobili: Negative   Blood: x / Protein: Trace / Nitrite: Negative   Leuk Esterase: Negative / RBC: 2 /hpf / WBC 2 /HPF   Sq Epi: x / Non Sq Epi: x / Bacteria: Negative                          12.8   13.62 )-----------( 302      ( 23 Oct 2023 16:47 )             39.4     IMAGING    < from: CT Abdomen and Pelvis w/ IV Cont (10.23.23 @ 18:58) >    FINDINGS:    Pulmonary angiogram: No pulmonary embolism to the level of the proximal   segmental arteries. Evaluation of the distal branches is limited due to   suboptimal arterial opacification and respiratory motion artifact.  LUNGS AND AIRWAYS: Patent central airways.  Bibasilar dependent   atelectasis.  PLEURA: No pleural effusion.  MEDIASTINUM AND LIV: No lymphadenopathy.  VESSELS: Aortic and coronary artery calcifications. The ascending   thoracic aorta measures 4.3 cm at the level of the main pulmonary artery.  HEART: Heart size is normal. No pericardial effusion.  CHEST WALL AND LOWER NECK: Within normal limits.  VISUALIZED UPPER ABDOMEN: Within normal limits.  BONES: Within normal limits.    ABDOMEN AND PELVIS:  Streak artifact from spinal hardware limits evaluation of the surrounding   structures.    LIVER: Nodular contour. Multiple hypodense lesions, which appear to have   increased in size and number since 2023. For example, a right   hepatic lobe lesion measures 2.8 x 2.5 cm, which was not evident on prior   study.  BILE DUCTS: Normal caliber.  GALLBLADDER: Within normal limits.  SPLEEN: Within normal limits.  PANCREAS: Within normal limits.  ADRENALS: Within normal limits.  KIDNEYS/URETERS: No hydronephrosis. Right renal cysts and additional   bilateral subcentimeter hypodense foci, too small to characterize. 7 mm   nonobstructing left renal calculus and punctate nonobstructing right   renal calculi.    BLADDER: Redemonstrated two large calculi measuring up to 3.3 cm.  REPRODUCTIVE ORGANS: Prostate is enlarged.    BOWEL: No bowel obstruction. Appendix is not visualized.  PERITONEUM: No ascites.  VESSELS: Atherosclerotic changes.  RETROPERITONEUM/LYMPH NODES: No lymphadenopathy.  ABDOMINAL WALL: Postsurgical changes. Small left fat-containing inguinal   hernia.  BONES: Thoracolumbosacral spinal fusion and right total hip arthroplasty.   Multilevel laminectomies.    IMPRESSION:  No pulmonary embolism. Evaluation of the segmental/subsegmental arteries   is limited due to suboptimal arterial opacification and respiratory   motion artifact.    Multiple metastatic hepatic lesions, increased in size and number since   2023.    < end of copied text >

## 2023-10-24 DIAGNOSIS — R65.10 SYSTEMIC INFLAMMATORY RESPONSE SYNDROME (SIRS) OF NON-INFECTIOUS ORIGIN WITHOUT ACUTE ORGAN DYSFUNCTION: ICD-10-CM

## 2023-10-24 DIAGNOSIS — C7A.8 OTHER MALIGNANT NEUROENDOCRINE TUMORS: ICD-10-CM

## 2023-10-24 DIAGNOSIS — T45.1X5A ADVERSE EFFECT OF ANTINEOPLASTIC AND IMMUNOSUPPRESSIVE DRUGS, INITIAL ENCOUNTER: ICD-10-CM

## 2023-10-24 DIAGNOSIS — E11.9 TYPE 2 DIABETES MELLITUS WITHOUT COMPLICATIONS: ICD-10-CM

## 2023-10-24 DIAGNOSIS — Z29.9 ENCOUNTER FOR PROPHYLACTIC MEASURES, UNSPECIFIED: ICD-10-CM

## 2023-10-24 DIAGNOSIS — I25.10 ATHEROSCLEROTIC HEART DISEASE OF NATIVE CORONARY ARTERY WITHOUT ANGINA PECTORIS: ICD-10-CM

## 2023-10-24 DIAGNOSIS — I20.89 OTHER FORMS OF ANGINA PECTORIS: ICD-10-CM

## 2023-10-24 LAB
ALBUMIN SERPL ELPH-MCNC: 3.2 G/DL — LOW (ref 3.3–5)
ALBUMIN SERPL ELPH-MCNC: 3.2 G/DL — LOW (ref 3.3–5)
ALP SERPL-CCNC: 137 U/L — HIGH (ref 40–120)
ALP SERPL-CCNC: 137 U/L — HIGH (ref 40–120)
ALT FLD-CCNC: 68 U/L — HIGH (ref 10–45)
ALT FLD-CCNC: 68 U/L — HIGH (ref 10–45)
ANION GAP SERPL CALC-SCNC: 14 MMOL/L — SIGNIFICANT CHANGE UP (ref 5–17)
ANION GAP SERPL CALC-SCNC: 14 MMOL/L — SIGNIFICANT CHANGE UP (ref 5–17)
AST SERPL-CCNC: 133 U/L — HIGH (ref 10–40)
AST SERPL-CCNC: 133 U/L — HIGH (ref 10–40)
BILIRUB SERPL-MCNC: 0.6 MG/DL — SIGNIFICANT CHANGE UP (ref 0.2–1.2)
BILIRUB SERPL-MCNC: 0.6 MG/DL — SIGNIFICANT CHANGE UP (ref 0.2–1.2)
BUN SERPL-MCNC: 29 MG/DL — HIGH (ref 7–23)
BUN SERPL-MCNC: 29 MG/DL — HIGH (ref 7–23)
CALCIUM SERPL-MCNC: 9.3 MG/DL — SIGNIFICANT CHANGE UP (ref 8.4–10.5)
CALCIUM SERPL-MCNC: 9.3 MG/DL — SIGNIFICANT CHANGE UP (ref 8.4–10.5)
CHLORIDE SERPL-SCNC: 104 MMOL/L — SIGNIFICANT CHANGE UP (ref 96–108)
CHLORIDE SERPL-SCNC: 104 MMOL/L — SIGNIFICANT CHANGE UP (ref 96–108)
CK MB BLD-MCNC: 0.4 % — SIGNIFICANT CHANGE UP (ref 0–3.5)
CK MB BLD-MCNC: 0.4 % — SIGNIFICANT CHANGE UP (ref 0–3.5)
CK MB CFR SERPL CALC: 1.9 NG/ML — SIGNIFICANT CHANGE UP (ref 0–6.7)
CK MB CFR SERPL CALC: 1.9 NG/ML — SIGNIFICANT CHANGE UP (ref 0–6.7)
CK SERPL-CCNC: 465 U/L — HIGH (ref 30–200)
CK SERPL-CCNC: 465 U/L — HIGH (ref 30–200)
CO2 SERPL-SCNC: 20 MMOL/L — LOW (ref 22–31)
CO2 SERPL-SCNC: 20 MMOL/L — LOW (ref 22–31)
CREAT SERPL-MCNC: 1.15 MG/DL — SIGNIFICANT CHANGE UP (ref 0.5–1.3)
CREAT SERPL-MCNC: 1.15 MG/DL — SIGNIFICANT CHANGE UP (ref 0.5–1.3)
EGFR: 65 ML/MIN/1.73M2 — SIGNIFICANT CHANGE UP
EGFR: 65 ML/MIN/1.73M2 — SIGNIFICANT CHANGE UP
GLUCOSE BLDC GLUCOMTR-MCNC: 133 MG/DL — HIGH (ref 70–99)
GLUCOSE BLDC GLUCOMTR-MCNC: 133 MG/DL — HIGH (ref 70–99)
GLUCOSE BLDC GLUCOMTR-MCNC: 139 MG/DL — HIGH (ref 70–99)
GLUCOSE BLDC GLUCOMTR-MCNC: 139 MG/DL — HIGH (ref 70–99)
GLUCOSE BLDC GLUCOMTR-MCNC: 145 MG/DL — HIGH (ref 70–99)
GLUCOSE BLDC GLUCOMTR-MCNC: 145 MG/DL — HIGH (ref 70–99)
GLUCOSE BLDC GLUCOMTR-MCNC: 165 MG/DL — HIGH (ref 70–99)
GLUCOSE BLDC GLUCOMTR-MCNC: 165 MG/DL — HIGH (ref 70–99)
GLUCOSE SERPL-MCNC: 164 MG/DL — HIGH (ref 70–99)
GLUCOSE SERPL-MCNC: 164 MG/DL — HIGH (ref 70–99)
HCT VFR BLD CALC: 37.8 % — LOW (ref 39–50)
HCT VFR BLD CALC: 37.8 % — LOW (ref 39–50)
HGB BLD-MCNC: 12.2 G/DL — LOW (ref 13–17)
HGB BLD-MCNC: 12.2 G/DL — LOW (ref 13–17)
LIDOCAIN IGE QN: 107 U/L — HIGH (ref 7–60)
LIDOCAIN IGE QN: 107 U/L — HIGH (ref 7–60)
MCHC RBC-ENTMCNC: 29.8 PG — SIGNIFICANT CHANGE UP (ref 27–34)
MCHC RBC-ENTMCNC: 29.8 PG — SIGNIFICANT CHANGE UP (ref 27–34)
MCHC RBC-ENTMCNC: 32.3 GM/DL — SIGNIFICANT CHANGE UP (ref 32–36)
MCHC RBC-ENTMCNC: 32.3 GM/DL — SIGNIFICANT CHANGE UP (ref 32–36)
MCV RBC AUTO: 92.2 FL — SIGNIFICANT CHANGE UP (ref 80–100)
MCV RBC AUTO: 92.2 FL — SIGNIFICANT CHANGE UP (ref 80–100)
NRBC # BLD: 0 /100 WBCS — SIGNIFICANT CHANGE UP (ref 0–0)
NRBC # BLD: 0 /100 WBCS — SIGNIFICANT CHANGE UP (ref 0–0)
PLATELET # BLD AUTO: 240 K/UL — SIGNIFICANT CHANGE UP (ref 150–400)
PLATELET # BLD AUTO: 240 K/UL — SIGNIFICANT CHANGE UP (ref 150–400)
POTASSIUM SERPL-MCNC: 4.6 MMOL/L — SIGNIFICANT CHANGE UP (ref 3.5–5.3)
POTASSIUM SERPL-MCNC: 4.6 MMOL/L — SIGNIFICANT CHANGE UP (ref 3.5–5.3)
POTASSIUM SERPL-SCNC: 4.6 MMOL/L — SIGNIFICANT CHANGE UP (ref 3.5–5.3)
POTASSIUM SERPL-SCNC: 4.6 MMOL/L — SIGNIFICANT CHANGE UP (ref 3.5–5.3)
PROT SERPL-MCNC: 6.6 G/DL — SIGNIFICANT CHANGE UP (ref 6–8.3)
PROT SERPL-MCNC: 6.6 G/DL — SIGNIFICANT CHANGE UP (ref 6–8.3)
RBC # BLD: 4.1 M/UL — LOW (ref 4.2–5.8)
RBC # BLD: 4.1 M/UL — LOW (ref 4.2–5.8)
RBC # FLD: 14.8 % — HIGH (ref 10.3–14.5)
RBC # FLD: 14.8 % — HIGH (ref 10.3–14.5)
SODIUM SERPL-SCNC: 138 MMOL/L — SIGNIFICANT CHANGE UP (ref 135–145)
SODIUM SERPL-SCNC: 138 MMOL/L — SIGNIFICANT CHANGE UP (ref 135–145)
TROPONIN T, HIGH SENSITIVITY RESULT: 81 NG/L — HIGH (ref 0–51)
TROPONIN T, HIGH SENSITIVITY RESULT: 81 NG/L — HIGH (ref 0–51)
WBC # BLD: 13.37 K/UL — HIGH (ref 3.8–10.5)
WBC # BLD: 13.37 K/UL — HIGH (ref 3.8–10.5)
WBC # FLD AUTO: 13.37 K/UL — HIGH (ref 3.8–10.5)
WBC # FLD AUTO: 13.37 K/UL — HIGH (ref 3.8–10.5)

## 2023-10-24 PROCEDURE — 99233 SBSQ HOSP IP/OBS HIGH 50: CPT

## 2023-10-24 RX ORDER — PANTOPRAZOLE SODIUM 20 MG/1
40 TABLET, DELAYED RELEASE ORAL
Refills: 0 | Status: DISCONTINUED | OUTPATIENT
Start: 2023-10-24 | End: 2023-11-04

## 2023-10-24 RX ORDER — ACETAMINOPHEN 500 MG
975 TABLET ORAL EVERY 6 HOURS
Refills: 0 | Status: DISCONTINUED | OUTPATIENT
Start: 2023-10-24 | End: 2023-11-04

## 2023-10-24 RX ORDER — SENNA PLUS 8.6 MG/1
2 TABLET ORAL AT BEDTIME
Refills: 0 | Status: DISCONTINUED | OUTPATIENT
Start: 2023-10-24 | End: 2023-11-04

## 2023-10-24 RX ORDER — DEXTROSE 50 % IN WATER 50 %
25 SYRINGE (ML) INTRAVENOUS ONCE
Refills: 0 | Status: DISCONTINUED | OUTPATIENT
Start: 2023-10-24 | End: 2023-11-04

## 2023-10-24 RX ORDER — SODIUM CHLORIDE 9 MG/ML
1000 INJECTION, SOLUTION INTRAVENOUS
Refills: 0 | Status: DISCONTINUED | OUTPATIENT
Start: 2023-10-24 | End: 2023-11-04

## 2023-10-24 RX ORDER — ENOXAPARIN SODIUM 100 MG/ML
40 INJECTION SUBCUTANEOUS EVERY 24 HOURS
Refills: 0 | Status: DISCONTINUED | OUTPATIENT
Start: 2023-10-24 | End: 2023-10-27

## 2023-10-24 RX ORDER — POLYETHYLENE GLYCOL 3350 17 G/17G
17 POWDER, FOR SOLUTION ORAL DAILY
Refills: 0 | Status: DISCONTINUED | OUTPATIENT
Start: 2023-10-24 | End: 2023-11-04

## 2023-10-24 RX ORDER — ATORVASTATIN CALCIUM 80 MG/1
80 TABLET, FILM COATED ORAL AT BEDTIME
Refills: 0 | Status: DISCONTINUED | OUTPATIENT
Start: 2023-10-24 | End: 2023-11-04

## 2023-10-24 RX ORDER — INSULIN LISPRO 100/ML
VIAL (ML) SUBCUTANEOUS
Refills: 0 | Status: DISCONTINUED | OUTPATIENT
Start: 2023-10-24 | End: 2023-11-04

## 2023-10-24 RX ORDER — METFORMIN HYDROCHLORIDE 850 MG/1
1 TABLET ORAL
Qty: 0 | Refills: 0 | DISCHARGE

## 2023-10-24 RX ORDER — DEXTROSE 50 % IN WATER 50 %
12.5 SYRINGE (ML) INTRAVENOUS ONCE
Refills: 0 | Status: DISCONTINUED | OUTPATIENT
Start: 2023-10-24 | End: 2023-11-04

## 2023-10-24 RX ORDER — INSULIN LISPRO 100/ML
VIAL (ML) SUBCUTANEOUS AT BEDTIME
Refills: 0 | Status: DISCONTINUED | OUTPATIENT
Start: 2023-10-24 | End: 2023-11-04

## 2023-10-24 RX ORDER — CLOPIDOGREL BISULFATE 75 MG/1
75 TABLET, FILM COATED ORAL DAILY
Refills: 0 | Status: DISCONTINUED | OUTPATIENT
Start: 2023-10-24 | End: 2023-11-04

## 2023-10-24 RX ORDER — CEFTRIAXONE 500 MG/1
1000 INJECTION, POWDER, FOR SOLUTION INTRAMUSCULAR; INTRAVENOUS EVERY 24 HOURS
Refills: 0 | Status: DISCONTINUED | OUTPATIENT
Start: 2023-10-24 | End: 2023-10-25

## 2023-10-24 RX ORDER — ASPIRIN/CALCIUM CARB/MAGNESIUM 324 MG
81 TABLET ORAL DAILY
Refills: 0 | Status: DISCONTINUED | OUTPATIENT
Start: 2023-10-24 | End: 2023-10-27

## 2023-10-24 RX ORDER — OXYCODONE HYDROCHLORIDE 5 MG/1
5 TABLET ORAL EVERY 6 HOURS
Refills: 0 | Status: DISCONTINUED | OUTPATIENT
Start: 2023-10-24 | End: 2023-10-24

## 2023-10-24 RX ORDER — METOPROLOL TARTRATE 50 MG
25 TABLET ORAL DAILY
Refills: 0 | Status: DISCONTINUED | OUTPATIENT
Start: 2023-10-24 | End: 2023-10-27

## 2023-10-24 RX ORDER — GLUCAGON INJECTION, SOLUTION 0.5 MG/.1ML
1 INJECTION, SOLUTION SUBCUTANEOUS ONCE
Refills: 0 | Status: DISCONTINUED | OUTPATIENT
Start: 2023-10-24 | End: 2023-11-04

## 2023-10-24 RX ORDER — ALLOPURINOL 300 MG
200 TABLET ORAL DAILY
Refills: 0 | Status: DISCONTINUED | OUTPATIENT
Start: 2023-10-24 | End: 2023-11-04

## 2023-10-24 RX ORDER — DEXTROSE 50 % IN WATER 50 %
15 SYRINGE (ML) INTRAVENOUS ONCE
Refills: 0 | Status: DISCONTINUED | OUTPATIENT
Start: 2023-10-24 | End: 2023-11-04

## 2023-10-24 RX ADMIN — CEFTRIAXONE 100 MILLIGRAM(S): 500 INJECTION, POWDER, FOR SOLUTION INTRAMUSCULAR; INTRAVENOUS at 05:01

## 2023-10-24 RX ADMIN — Medication 200 MILLIGRAM(S): at 12:16

## 2023-10-24 RX ADMIN — Medication 25 MILLIGRAM(S): at 05:02

## 2023-10-24 RX ADMIN — SENNA PLUS 2 TABLET(S): 8.6 TABLET ORAL at 21:26

## 2023-10-24 RX ADMIN — PANTOPRAZOLE SODIUM 40 MILLIGRAM(S): 20 TABLET, DELAYED RELEASE ORAL at 05:02

## 2023-10-24 RX ADMIN — Medication 1: at 12:14

## 2023-10-24 RX ADMIN — ENOXAPARIN SODIUM 40 MILLIGRAM(S): 100 INJECTION SUBCUTANEOUS at 05:01

## 2023-10-24 RX ADMIN — ATORVASTATIN CALCIUM 80 MILLIGRAM(S): 80 TABLET, FILM COATED ORAL at 21:26

## 2023-10-24 RX ADMIN — CLOPIDOGREL BISULFATE 75 MILLIGRAM(S): 75 TABLET, FILM COATED ORAL at 12:16

## 2023-10-24 RX ADMIN — POLYETHYLENE GLYCOL 3350 17 GRAM(S): 17 POWDER, FOR SOLUTION ORAL at 12:17

## 2023-10-24 RX ADMIN — Medication 81 MILLIGRAM(S): at 12:16

## 2023-10-24 NOTE — PROGRESS NOTE ADULT - SUBJECTIVE AND OBJECTIVE BOX
[facility] Division of Hospital Medicine  Stevenson Wilson MD  Contact M-F, 8A-5P through [employer] Teams  Other times, contact Hospitalist on call    Patient is a 78y old  Male who presents with a chief complaint of Chest pain (23 Oct 2023 23:34)      SUBJECTIVE / OVERNIGHT EVENTS: still w epigastric pain worsened with breaths  ADDITIONAL REVIEW OF SYSTEMS:    MEDICATIONS  (STANDING):  allopurinol 200 milliGRAM(s) Oral daily  aspirin enteric coated 81 milliGRAM(s) Oral daily  atorvastatin 80 milliGRAM(s) Oral at bedtime  cefTRIAXone   IVPB 1000 milliGRAM(s) IV Intermittent every 24 hours  clopidogrel Tablet 75 milliGRAM(s) Oral daily  dextrose 5%. 1000 milliLiter(s) (100 mL/Hr) IV Continuous <Continuous>  dextrose 5%. 1000 milliLiter(s) (50 mL/Hr) IV Continuous <Continuous>  dextrose 50% Injectable 25 Gram(s) IV Push once  dextrose 50% Injectable 12.5 Gram(s) IV Push once  dextrose 50% Injectable 25 Gram(s) IV Push once  enoxaparin Injectable 40 milliGRAM(s) SubCutaneous every 24 hours  glucagon  Injectable 1 milliGRAM(s) IntraMuscular once  insulin lispro (ADMELOG) corrective regimen sliding scale   SubCutaneous at bedtime  insulin lispro (ADMELOG) corrective regimen sliding scale   SubCutaneous three times a day before meals  metoprolol succinate ER 25 milliGRAM(s) Oral daily  pantoprazole    Tablet 40 milliGRAM(s) Oral before breakfast  polyethylene glycol 3350 17 Gram(s) Oral daily  senna 2 Tablet(s) Oral at bedtime    MEDICATIONS  (PRN):  acetaminophen     Tablet .. 975 milliGRAM(s) Oral every 6 hours PRN Temp greater or equal to 38C (100.4F), Moderate Pain (4 - 6)  dextrose Oral Gel 15 Gram(s) Oral once PRN Blood Glucose LESS THAN 70 milliGRAM(s)/deciliter  oxyCODONE    IR 5 milliGRAM(s) Oral every 6 hours PRN Severe Pain (7 - 10)      CAPILLARY BLOOD GLUCOSE      POCT Blood Glucose.: 165 mg/dL (24 Oct 2023 11:47)  POCT Blood Glucose.: 145 mg/dL (24 Oct 2023 07:35)    I&O's Summary    23 Oct 2023 07:01  -  24 Oct 2023 07:00  --------------------------------------------------------  IN: 0 mL / OUT: 500 mL / NET: -500 mL    24 Oct 2023 07:01  -  24 Oct 2023 14:35  --------------------------------------------------------  IN: 0 mL / OUT: 100 mL / NET: -100 mL        PHYSICAL EXAM:  Vital Signs Last 24 Hrs  T(C): 36.8 (24 Oct 2023 04:27), Max: 38.2 (23 Oct 2023 16:06)  T(F): 98.2 (24 Oct 2023 04:27), Max: 100.7 (23 Oct 2023 16:06)  HR: 113 (24 Oct 2023 04:27) (87 - 118)  BP: 120/62 (24 Oct 2023 04:27) (110/70 - 124/63)  BP(mean): --  RR: 18 (24 Oct 2023 04:27) (14 - 19)  SpO2: 96% (24 Oct 2023 04:27) (96% - 98%)    Parameters below as of 24 Oct 2023 04:27  Patient On (Oxygen Delivery Method): room air    CONSTITUTIONAL: Well-groomed, in no apparent distress  EYES: No conjunctival or scleral injection, non-icteric; PERRLA and symmetric  ENMT: No external nasal lesions; nasal mucosa not inflamed; no pharyngeal injection or exudates, oral mucosa with moist membranes  RESPIRATORY: Breathing comfortably; lungs CTA without wheeze/rhonchi/rales  CARDIOVASCULAR: Tachycardic, irregular rhythm, +S1S2, no M/G/R; pedal pulses full and symmetric; trace lower extremity edema; lower chest pain reproducible with palpation.  GASTROINTESTINAL: moderate tenderness primarily epigastric and RUQ with voluntary guarding, No palpable masses, +BS throughout, no rebound no hepatosplenomegaly; no hernia palpated  LYMPHATIC: No cervical LAD or tenderness  MUSCULOSKELETAL: No malalignment of extremities; no joint swelling or tenderness  SKIN: No rashes or ulcers noted; no subcutaneous nodules or induration palpable  NEUROLOGIC: CN grossly intact; sensation intact in LEs b/l to light touch; normal strength and tone  PSYCHIATRIC: A+O x 3; mood and affect appropriate; appropriate insight and judgment    LABS:                        12.2   13.37 )-----------( 240      ( 24 Oct 2023 06:44 )             37.8     10-24    138  |  104  |  29<H>  ----------------------------<  164<H>  4.6   |  20<L>  |  1.15    Ca    9.3      24 Oct 2023 06:44    TPro  6.6  /  Alb  3.2<L>  /  TBili  0.6  /  DBili  x   /  AST  133<H>  /  ALT  68<H>  /  AlkPhos  137<H>  10-24    PT/INR - ( 23 Oct 2023 16:47 )   PT: 13.3 sec;   INR: 1.22 ratio         PTT - ( 23 Oct 2023 16:47 )  PTT:29.0 sec  CARDIAC MARKERS ( 24 Oct 2023 01:37 )  x     / x     / 465 U/L / x     / 1.9 ng/mL      Urinalysis Basic - ( 24 Oct 2023 06:44 )    Color: x / Appearance: x / SG: x / pH: x  Gluc: 164 mg/dL / Ketone: x  / Bili: x / Urobili: x   Blood: x / Protein: x / Nitrite: x   Leuk Esterase: x / RBC: x / WBC x   Sq Epi: x / Non Sq Epi: x / Bacteria: x            RADIOLOGY & ADDITIONAL TESTS:  Results Reviewed:   Imaging Personally Reviewed:  Electrocardiogram Personally Reviewed:    COORDINATION OF CARE:  Care Discussed with Consultants/Other Providers [Y/N]: cards Dr Hayes and onc NP Christen tavares plan of care  Prior or Outpatient Records Reviewed [Y/N]:

## 2023-10-24 NOTE — CONSULT NOTE ADULT - ASSESSMENT
The patient is a 78y Male complaining of chest pain.    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     leukocytosis   --2/2 steroids vs infection, pt given dexamethasone 20mg IV prior to treatment w/ Lutathera on 10/20  --on IV abx  --would monitor off abx   --cultures pending    anemia  --2/2 malignancy and treatment  --will monitor CBC    will follow    Christen Rosales NP  Hematology/ Oncology  [facility]  [phone] (office)  [phone] (alt office)  Evenings and weekends please call MD on call or office   The patient is a 78y Male complaining of chest pain.    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     leukocytosis   --2/2 steroids vs infection, pt given dexamethasone 20mg IV prior to treatment w/ Lutathera on 10/20  --on IV abx  --would monitor off abx   --cultures pending    anemia  --2/2 malignancy and treatment  --will monitor CBC    will follow and coordinate care w/ MSJEREMY Rosales NP  Hematology/ Oncology  [facility]  [phone] (office)  [phone] (alt office)  Evenings and weekends please call MD on call or office

## 2023-10-24 NOTE — CONSULT NOTE ADULT - SUBJECTIVE AND OBJECTIVE BOX
DATE OF SERVICE: 10-24-23 @ 20:23    CHIEF COMPLAINT:Patient is a 78y old  Male who presents with a chief complaint of Chest pain (24 Oct 2023 14:36)      HISTORY OF PRESENT ILLNESS:  78 M with hx of CAD s/p NURIA to distal LAD in 6/2023, HTN, HLD, EtOH disorder, T2DM, spinal surgeries, neuroendocrine tumor on Lutathera (first treatment on Friday 10/20 at [facility] [place]), presenting with fatigue and chest pain since Friday. Patient reports severe fatigue starting Friday after his first treatment with Lutathera. Pt also reported mid-left lower chest pain described as pressure. No radiation, no aggravating or alleviating factors. Pt has never felt this type of chest pain before. Denies palpitations, diaphoresis, dyspnea, nausea, vomiting. Pt has diffuse abdominal pain. Also with constipation; last BM several days ago. No other acute complaints. In ED, patient developed fever with Tmax 100.7. CT imaging negative for PE but showed interval worsening of metastatic hepatic lesions. UA unremarkable. Troponin 73 --> 82, EKG showing sinus tachycardia with PACs. Pt given cefepime in ED.  (23 Oct 2023 23:34)      PAST MEDICAL & SURGICAL HISTORY:  Hypertension      Hypercholesterolemia      PAD (peripheral artery disease)      Neuroendocrine carcinoma      S/P knee replacement, bilateral      S/P hip replacement  right hip      History of hernia repair      H/O laminectomy      History of lumbosacral spine surgery              MEDICATIONS:  aspirin enteric coated 81 milliGRAM(s) Oral daily  clopidogrel Tablet 75 milliGRAM(s) Oral daily  enoxaparin Injectable 40 milliGRAM(s) SubCutaneous every 24 hours  metoprolol succinate ER 25 milliGRAM(s) Oral daily    cefTRIAXone   IVPB 1000 milliGRAM(s) IV Intermittent every 24 hours      acetaminophen     Tablet .. 975 milliGRAM(s) Oral every 6 hours PRN  oxyCODONE    IR 5 milliGRAM(s) Oral every 6 hours PRN    pantoprazole    Tablet 40 milliGRAM(s) Oral before breakfast  polyethylene glycol 3350 17 Gram(s) Oral daily  senna 2 Tablet(s) Oral at bedtime    allopurinol 200 milliGRAM(s) Oral daily  atorvastatin 80 milliGRAM(s) Oral at bedtime  dextrose 50% Injectable 25 Gram(s) IV Push once  dextrose 50% Injectable 12.5 Gram(s) IV Push once  dextrose 50% Injectable 25 Gram(s) IV Push once  dextrose Oral Gel 15 Gram(s) Oral once PRN  glucagon  Injectable 1 milliGRAM(s) IntraMuscular once  insulin lispro (ADMELOG) corrective regimen sliding scale   SubCutaneous three times a day before meals  insulin lispro (ADMELOG) corrective regimen sliding scale   SubCutaneous at bedtime    dextrose 5%. 1000 milliLiter(s) IV Continuous <Continuous>  dextrose 5%. 1000 milliLiter(s) IV Continuous <Continuous>      FAMILY HISTORY:      Non-contributory    SOCIAL HISTORY:    Not an active smoker    Allergies    No Known Allergies    Intolerances    	    REVIEW OF SYSTEMS:  CONSTITUTIONAL: No fever  EYES: No eye pain, visual disturbances, or discharge  ENMT:  No difficulty hearing, tinnitus  NECK: No pain or stiffness  RESPIRATORY: No cough, wheezing, + SOB  CARDIOVASCULAR: + chest pain, palpitations, passing out, dizziness, or leg swelling  GASTROINTESTINAL:  No nausea, vomiting, diarrhea or constipation. No melena.  GENITOURINARY: No dysuria, hematuria  NEUROLOGICAL: No stroke like symptoms  SKIN: No burning or lesions   ENDOCRINE: No heat or cold intolerance  MUSCULOSKELETAL: No joint pain or swelling  PSYCHIATRIC: No  anxiety, mood swings  HEME/LYMPH: No bleeding gums  ALLERGY AND IMMUNOLOGIC: No hives or eczema	    All other ROS negative    PHYSICAL EXAM:  T(C): 37.2 (10-24-23 @ 17:34), Max: 37.2 (10-24-23 @ 17:34)  HR: 97 (10-24-23 @ 17:34) (97 - 113)  BP: 100/63 (10-24-23 @ 17:34) (100/63 - 120/62)  RR: 18 (10-24-23 @ 17:34) (18 - 19)  SpO2: 97% (10-24-23 @ 17:34) (96% - 98%)  Wt(kg): --  I&O's Summary    23 Oct 2023 07:01  -  24 Oct 2023 07:00  --------------------------------------------------------  IN: 0 mL / OUT: 500 mL / NET: -500 mL    24 Oct 2023 07:01  -  24 Oct 2023 20:23  --------------------------------------------------------  IN: 200 mL / OUT: 500 mL / NET: -300 mL        Appearance: Normal	  HEENT:   Normal oral mucosa, EOMI	  Cardiovascular:  S1 S2, No JVD,    Respiratory: Lungs clear to auscultation	  Psychiatry: Alert  Gastrointestinal:  Soft, Non-tender, + BS	  Skin: No rashes   Neurologic: Non-focal  Extremities:  No edema  Vascular: Peripheral pulses palpable    	    	  	  CARDIAC MARKERS:  Labs personally reviewed by me                                  12.2   13.37 )-----------( 240      ( 24 Oct 2023 06:44 )             37.8     10-24    138  |  104  |  29<H>  ----------------------------<  164<H>  4.6   |  20<L>  |  1.15    Ca    9.3      24 Oct 2023 06:44    TPro  6.6  /  Alb  3.2<L>  /  TBili  0.6  /  DBili  x   /  AST  133<H>  /  ALT  68<H>  /  AlkPhos  137<H>  10-24          EKG: Personally reviewed by me - No ECG noted in chart or EMR  Radiology: Personally reviewed by me -     < from: Xray Chest 1 View AP/PA (10.23.23 @ 18:39) >    IMPRESSION:  Clear lungs. No pneumoperitoneum.    < end of copied text >  < from: CT Abdomen and Pelvis w/ IV Cont (10.23.23 @ 18:58) >  No pulmonary embolism. Evaluation of the segmental/subsegmental arteries   is limited due to suboptimal arterial opacification and respiratory   motion artifact.    Multiple metastatic hepatic lesions, increased in size and number since   8/16/2023.    < end of copied text >  < from: Cardiac Catheterization (06.23.23 @ 11:01) >  Successful PCI with NURIA to the distal LAD.  DAPT for 6 mths     < end of copied text >    Assessment /Plan:     78 M with hx of CAD s/p NURIA to distal LAD in 6/2023, HTN, HLD, EtOH disorder, T2DM, spinal surgeries, neuroendocrine tumor on Lutathera (first treatment on Friday 10/20 at [facility] [place]), presenting with fatigue and chest pain since Friday. Patient reports severe fatigue starting Friday after his first treatment with Lutathera. Pt also reported mid-left lower chest pain described as pressure. No radiation, no aggravating or alleviating factors. Pt has never felt this type of chest pain before. Denies palpitations, diaphoresis, dyspnea, nausea, vomiting. Pt has diffuse abdominal pain. Also with constipation; last BM several days ago. No other acute complaints. In ED, patient developed fever with Tmax 100.7. CT imaging negative for PE but showed interval worsening of metastatic hepatic lesions. UA unremarkable. Troponin 73 --> 82, EKG showing sinus tachycardia with PACs. Pt given cefepime in ED.     Problem/Plan - 1:  ·  Problem: Chest Pain.   ·  Plan: Location described as LUQ abdomen with pain elicited with palpation .  - Will review EKG  - Trop 72-->83--> 81/ CKMB 1.9  - Mildly elevated LFTS and Lipase noted  - Hx of recent PCI  - Repeat TTE to assess LVEF (previously normal in June 2023) and r/o WMA       Problem/Plan - 2:  ·  Problem: Hypotension- resolved  ·  Plan: d/t diuretic use & diarrhea causing hypotension  - hold torsemide- resume when creatinine drops under 2  - daily weights  -i's & o's  - ivf as above  - monitor bp.       Problem/Plan - 3:  ·  Problem: RAZIA (acute kidney injury).   ·  Plan: likely ATN 2/2 hypotension and pre-renal RAZIA in setting of volume depletion diarrhea d/t lanreotide side effect  - hold lanreotide  - C/w current bicarb drip; nephro in agreement.   - bladder scan for PVR and renal US pending.   - monitor uop and hemodynamics     Problem/Plan - 4:  ·  Problem: Prophylactic measure.   ·  Plan: dvt ppx: hsq.  -BLLE dopplers negative for DVT  Differential diagnosis and plan of care discussed with patient after the evaluation. Counseling on diet, nutritional counseling, weight management, exercise and medication compliance was done.   Advanced care planning/advanced directives discussed with patient/family. DNR status including forceful chest compressions to attempt to restart the heart, ventilator support/artificial breathing, electric shock, artificial nutrition, health care proxy, Molst form all discussed with pt. Pt wishes to consider. More than fifteen minutes spent on discussing advanced directives.       Hank Hayes DO [facility]  Cardiovascular Medicine  [address] [place] [address] Dr, [address]  Available for call or text via [employer]  Office [phone]   DATE OF SERVICE: 10-24-23 @ 20:23    CHIEF COMPLAINT:Patient is a 78y old  Male who presents with a chief complaint of Chest pain (24 Oct 2023 14:36)      HISTORY OF PRESENT ILLNESS:  78 M with hx of CAD s/p NURIA to distal LAD in 6/2023, HTN, HLD, EtOH disorder, T2DM, spinal surgeries, neuroendocrine tumor on Lutathera (first treatment on Friday 10/20 at [facility] [place]), presenting with fatigue and chest pain since Friday. Patient reports severe fatigue starting Friday after his first treatment with Lutathera. Pt also reported mid-left lower chest pain described as pressure. No radiation, no aggravating or alleviating factors. Pt has never felt this type of chest pain before. Denies palpitations, diaphoresis, dyspnea, nausea, vomiting. Pt has diffuse abdominal pain. Also with constipation; last BM several days ago. No other acute complaints. In ED, patient developed fever with Tmax 100.7. CT imaging negative for PE but showed interval worsening of metastatic hepatic lesions. UA unremarkable. Troponin 73 --> 82, EKG showing sinus tachycardia with PACs. Pt given cefepime in ED.  (23 Oct 2023 23:34)      PAST MEDICAL & SURGICAL HISTORY:  Hypertension      Hypercholesterolemia      PAD (peripheral artery disease)      Neuroendocrine carcinoma      S/P knee replacement, bilateral      S/P hip replacement  right hip      History of hernia repair      H/O laminectomy      History of lumbosacral spine surgery              MEDICATIONS:  aspirin enteric coated 81 milliGRAM(s) Oral daily  clopidogrel Tablet 75 milliGRAM(s) Oral daily  enoxaparin Injectable 40 milliGRAM(s) SubCutaneous every 24 hours  metoprolol succinate ER 25 milliGRAM(s) Oral daily    cefTRIAXone   IVPB 1000 milliGRAM(s) IV Intermittent every 24 hours      acetaminophen     Tablet .. 975 milliGRAM(s) Oral every 6 hours PRN  oxyCODONE    IR 5 milliGRAM(s) Oral every 6 hours PRN    pantoprazole    Tablet 40 milliGRAM(s) Oral before breakfast  polyethylene glycol 3350 17 Gram(s) Oral daily  senna 2 Tablet(s) Oral at bedtime    allopurinol 200 milliGRAM(s) Oral daily  atorvastatin 80 milliGRAM(s) Oral at bedtime  dextrose 50% Injectable 25 Gram(s) IV Push once  dextrose 50% Injectable 12.5 Gram(s) IV Push once  dextrose 50% Injectable 25 Gram(s) IV Push once  dextrose Oral Gel 15 Gram(s) Oral once PRN  glucagon  Injectable 1 milliGRAM(s) IntraMuscular once  insulin lispro (ADMELOG) corrective regimen sliding scale   SubCutaneous three times a day before meals  insulin lispro (ADMELOG) corrective regimen sliding scale   SubCutaneous at bedtime    dextrose 5%. 1000 milliLiter(s) IV Continuous <Continuous>  dextrose 5%. 1000 milliLiter(s) IV Continuous <Continuous>      FAMILY HISTORY:      Non-contributory    SOCIAL HISTORY:    Not an active smoker    Allergies    No Known Allergies    Intolerances    	    REVIEW OF SYSTEMS:  CONSTITUTIONAL: No fever  EYES: No eye pain, visual disturbances, or discharge  ENMT:  No difficulty hearing, tinnitus  NECK: No pain or stiffness  RESPIRATORY: No cough, wheezing, + SOB  CARDIOVASCULAR: + chest pain, palpitations, passing out, dizziness, or leg swelling  GASTROINTESTINAL:  No nausea, vomiting, diarrhea or constipation. No melena.  GENITOURINARY: No dysuria, hematuria  NEUROLOGICAL: No stroke like symptoms  SKIN: No burning or lesions   ENDOCRINE: No heat or cold intolerance  MUSCULOSKELETAL: No joint pain or swelling  PSYCHIATRIC: No  anxiety, mood swings  HEME/LYMPH: No bleeding gums  ALLERGY AND IMMUNOLOGIC: No hives or eczema	    All other ROS negative    PHYSICAL EXAM:  T(C): 37.2 (10-24-23 @ 17:34), Max: 37.2 (10-24-23 @ 17:34)  HR: 97 (10-24-23 @ 17:34) (97 - 113)  BP: 100/63 (10-24-23 @ 17:34) (100/63 - 120/62)  RR: 18 (10-24-23 @ 17:34) (18 - 19)  SpO2: 97% (10-24-23 @ 17:34) (96% - 98%)  Wt(kg): --  I&O's Summary    23 Oct 2023 07:01  -  24 Oct 2023 07:00  --------------------------------------------------------  IN: 0 mL / OUT: 500 mL / NET: -500 mL    24 Oct 2023 07:01  -  24 Oct 2023 20:23  --------------------------------------------------------  IN: 200 mL / OUT: 500 mL / NET: -300 mL        Appearance: Normal	  HEENT:   Normal oral mucosa, EOMI	  Cardiovascular:  S1 S2, No JVD,    Respiratory: Lungs clear to auscultation	  Psychiatry: Alert  Gastrointestinal:  Soft, Non-tender, + BS	  Skin: No rashes   Neurologic: Non-focal  Extremities:  No edema  Vascular: Peripheral pulses palpable    	    	  	  CARDIAC MARKERS:  Labs personally reviewed by me                                  12.2   13.37 )-----------( 240      ( 24 Oct 2023 06:44 )             37.8     10-24    138  |  104  |  29<H>  ----------------------------<  164<H>  4.6   |  20<L>  |  1.15    Ca    9.3      24 Oct 2023 06:44    TPro  6.6  /  Alb  3.2<L>  /  TBili  0.6  /  DBili  x   /  AST  133<H>  /  ALT  68<H>  /  AlkPhos  137<H>  10-24          EKG: Personally reviewed by me - No ECG noted in chart or EMR  Radiology: Personally reviewed by me -     < from: Xray Chest 1 View AP/PA (10.23.23 @ 18:39) >    IMPRESSION:  Clear lungs. No pneumoperitoneum.    < end of copied text >  < from: CT Abdomen and Pelvis w/ IV Cont (10.23.23 @ 18:58) >  No pulmonary embolism. Evaluation of the segmental/subsegmental arteries   is limited due to suboptimal arterial opacification and respiratory   motion artifact.    Multiple metastatic hepatic lesions, increased in size and number since   8/16/2023.    < end of copied text >  < from: Cardiac Catheterization (06.23.23 @ 11:01) >  Successful PCI with NURIA to the distal LAD.  DAPT for 6 mths     < end of copied text >    Assessment /Plan:     78 M with hx of CAD s/p NURIA to distal LAD in 6/2023, HTN, HLD, EtOH disorder, T2DM, spinal surgeries, neuroendocrine tumor on Lutathera (first treatment on Friday 10/20 at [facility] [place]), presenting with fatigue and chest pain since Friday. Patient reports severe fatigue starting Friday after his first treatment with Lutathera. Pt also reported mid-left lower chest pain described as pressure. No radiation, no aggravating or alleviating factors. Pt has never felt this type of chest pain before. Denies palpitations, diaphoresis, dyspnea, nausea, vomiting. Pt has diffuse abdominal pain. Also with constipation; last BM several days ago. No other acute complaints. In ED, patient developed fever with Tmax 100.7. CT imaging negative for PE but showed interval worsening of metastatic hepatic lesions. UA unremarkable. Troponin 73 --> 82, EKG showing sinus tachycardia with PACs. Pt given cefepime in ED.     Problem/Plan - 1:  ·  Problem: Chest Pain.   ·  Plan: Location described as LUQ abdomen with pain elicited with palpation .  - Will review EKG  - Trop 72-->83--> 81/ CKMB 1.9  - Mildly elevated LFTS and Lipase noted  - Hx of recent PCI  - Repeat TTE to assess LVEF (previously normal in June 2023) and r/o WMA  - CP possibly 2/2 lutathera with known S/E of abdominal discomfort       Problem/Plan - 2:  ·  Problem: Shortness of Breath  ·  Plan: Will review ECG and TTE.  - BNP 2572  - CT neg for PE     Problem/Plan - 3:  ·  Problem: CAD.   ·  Plan: Recent PCI to pLAD in June 2023  - Will review ECG  - Trop/CKMB as noted above  - c/w ASA, Plavix and statin  - TTE pending     Problem/Plan - 4:  ·  Problem: Prophylactic measure.   ·  Plan: dvt ppx: Lovenox.    Differential diagnosis and plan of care discussed with patient after the evaluation. Counseling on diet, nutritional counseling, weight management, exercise and medication compliance was done.   Advanced care planning/advanced directives discussed with patient/family. DNR status including forceful chest compressions to attempt to restart the heart, ventilator support/artificial breathing, electric shock, artificial nutrition, health care proxy, Molst form all discussed with pt. Pt wishes to consider. More than fifteen minutes spent on discussing advanced directives.     Sulma WILSON-BC  Hank Hayes DO [facility]  Cardiovascular Medicine  [address], [address]  Available for call or text via [employer]  Office [phone]   DATE OF SERVICE: 10-24-23 @ 20:23    CHIEF COMPLAINT:Patient is a 78y old  Male who presents with a chief complaint of Chest pain (24 Oct 2023 14:36)      HISTORY OF PRESENT ILLNESS:  78 M with hx of CAD s/p NURIA to distal LAD in 6/2023, HTN, HLD, EtOH disorder, T2DM, spinal surgeries, neuroendocrine [place] on Lutathera [place] [facility] [place] with fatigue and chest pain since Friday. Patient reports severe fatigue starting Friday after his first treatment with Lutathera. Pt also reported mid-left lower chest pain described as pressure. No radiation, no aggravating or alleviating factors. Pt has never felt this type of chest pain before. Denies palpitations, diaphoresis, dyspnea, nausea, vomiting. Pt has diffuse abdominal pain. Also with constipation; last BM several days ago. No other acute complaints. In ED, patient developed fever with Tmax 100.7. CT imaging negative for PE but showed interval worsening of metastatic hepatic lesions. UA unremarkable. Troponin 73 --> 82, EKG showing sinus tachycardia with PACs. Pt given cefepime in ED.  (23 Oct 2023 23:34)      PAST MEDICAL & SURGICAL HISTORY:  Hypertension      Hypercholesterolemia      PAD (peripheral artery disease)      Neuroendocrine carcinoma      S/P knee replacement, bilateral      S/P hip replacement  right hip      History of hernia repair      H/O laminectomy      History of lumbosacral spine surgery              MEDICATIONS:  aspirin enteric coated 81 milliGRAM(s) Oral daily  clopidogrel Tablet 75 milliGRAM(s) Oral daily  enoxaparin Injectable 40 milliGRAM(s) SubCutaneous every 24 hours  metoprolol succinate ER 25 milliGRAM(s) Oral daily    cefTRIAXone   IVPB 1000 milliGRAM(s) IV Intermittent every 24 hours      acetaminophen     Tablet .. 975 milliGRAM(s) Oral every 6 hours PRN  oxyCODONE    IR 5 milliGRAM(s) Oral every 6 hours PRN    pantoprazole    Tablet 40 milliGRAM(s) Oral before breakfast  polyethylene glycol 3350 17 Gram(s) Oral daily  senna 2 Tablet(s) Oral at bedtime    allopurinol 200 milliGRAM(s) Oral daily  atorvastatin 80 milliGRAM(s) Oral at bedtime  dextrose 50% Injectable 25 Gram(s) IV Push once  dextrose 50% Injectable 12.5 Gram(s) IV Push once  dextrose 50% Injectable 25 Gram(s) IV Push once  dextrose Oral Gel 15 Gram(s) Oral once PRN  glucagon  Injectable 1 milliGRAM(s) IntraMuscular once  insulin lispro (ADMELOG) corrective regimen sliding scale   SubCutaneous three times a day before meals  insulin lispro (ADMELOG) corrective regimen sliding scale   SubCutaneous at bedtime    dextrose 5%. 1000 milliLiter(s) IV Continuous <Continuous>  dextrose 5%. 1000 milliLiter(s) IV Continuous <Continuous>      FAMILY HISTORY:      Non-contributory    SOCIAL HISTORY:    Not an active smoker    Allergies    No Known Allergies    Intolerances    	    REVIEW OF SYSTEMS:  CONSTITUTIONAL: No fever  EYES: No eye pain, visual disturbances, or discharge  ENMT:  No difficulty hearing, tinnitus  NECK: No pain or stiffness  RESPIRATORY: No cough, wheezing, + SOB  CARDIOVASCULAR: + chest pain, palpitations, passing out, dizziness, or leg swelling  GASTROINTESTINAL:  No nausea, vomiting, diarrhea or constipation. No melena.  GENITOURINARY: No dysuria, hematuria  NEUROLOGICAL: No stroke like symptoms  SKIN: No burning or lesions   ENDOCRINE: No heat or cold intolerance  MUSCULOSKELETAL: No joint pain or swelling  PSYCHIATRIC: No  anxiety, mood swings  HEME/LYMPH: No bleeding gums  ALLERGY AND IMMUNOLOGIC: No hives or eczema	    All other ROS negative    PHYSICAL EXAM:  T(C): 37.2 (10-24-23 @ 17:34), Max: 37.2 (10-24-23 @ 17:34)  HR: 97 (10-24-23 @ 17:34) (97 - 113)  BP: 100/63 (10-24-23 @ 17:34) (100/63 - 120/62)  RR: 18 (10-24-23 @ 17:34) (18 - 19)  SpO2: 97% (10-24-23 @ 17:34) (96% - 98%)  Wt(kg): --  I&O's Summary    23 Oct 2023 07:01  -  24 Oct 2023 07:00  --------------------------------------------------------  IN: 0 mL / OUT: 500 mL / NET: -500 mL    24 Oct 2023 07:01  -  24 Oct 2023 20:23  --------------------------------------------------------  IN: 200 mL / OUT: 500 mL / NET: -300 mL        Appearance: Normal	  HEENT:   Normal oral mucosa, EOMI	  Cardiovascular:  S1 S2, No JVD,    Respiratory: Lungs clear to auscultation	  Psychiatry: Alert  Gastrointestinal:  Soft, Non-tender, + BS	  Skin: No rashes   Neurologic: Non-focal  Extremities:  No edema  Vascular: Peripheral pulses palpable    	    	  	  CARDIAC MARKERS:  Labs personally reviewed by me                                  12.2   13.37 )-----------( 240      ( 24 Oct 2023 06:44 )             37.8     10-24    138  |  104  |  29<H>  ----------------------------<  164<H>  4.6   |  20<L>  |  1.15    Ca    9.3      24 Oct 2023 06:44    TPro  6.6  /  Alb  3.2<L>  /  TBili  0.6  /  DBili  x   /  AST  133<H>  /  ALT  68<H>  /  AlkPhos  137<H>  10-24          EKG: Personally reviewed by me - No ECG noted in chart or EMR  Radiology: Personally reviewed by me -     < from: Xray Chest 1 View AP/PA (10.23.23 @ 18:39) >    IMPRESSION:  Clear lungs. No pneumoperitoneum.    < end of copied text >  < from: CT Abdomen and Pelvis w/ IV Cont (10.23.23 @ 18:58) >  No pulmonary embolism. Evaluation of the segmental/subsegmental arteries   is limited due to suboptimal arterial opacification and respiratory   motion artifact.    Multiple metastatic hepatic lesions, increased in size and number since   8/16/2023.    < end of copied text >  < from: Cardiac Catheterization (06.23.23 @ 11:01) >  Successful PCI with NURIA to the distal LAD.  DAPT for 6 mths     < end of copied text >    Assessment /Plan:     78 M with hx of CAD s/p NURIA to distal LAD in 6/2023, HTN, HLD, EtOH disorder, T2DM, spinal surgeries, neuroendocrine tumor on Lutathera [place] [facility] [place] presenting with fatigue and chest pain since Friday. Patient reports severe fatigue starting Friday after his first treatment with Lutathera. Pt also reported mid-left lower chest pain described as pressure. No radiation, no aggravating or alleviating factors. Pt has never felt this type of chest pain before. Denies palpitations, diaphoresis, dyspnea, nausea, vomiting. Pt has diffuse abdominal pain. Also with constipation; last BM several days ago. No other acute complaints. In ED, patient developed fever with Tmax 100.7. CT imaging negative for PE but showed interval worsening of metastatic hepatic lesions. UA unremarkable. Troponin 73 --> 82, EKG showing sinus tachycardia with PACs. Pt given cefepime in ED.     Problem/Plan - 1:  ·  Problem: Chest Pain.   ·  Plan: Location described as LUQ abdomen with pain elicited with palpation, appears noncardiac   - Will review EKG  - Trop 72-->83--> 81/ CKMB 1.9  - Mildly elevated LFTS and Lipase noted  - Hx of recent PCI  - Repeat TTE to assess LVEF (previously normal in June 2023) and r/o WMA  - CP possibly 2/2 lutathera with known S/E of abdominal discomfort       Problem/Plan - 2:  ·  Problem: Shortness of Breath  ·  Plan: Will review ECG and TTE.  - BNP 2572  - CT neg for PE     Problem/Plan - 3:  ·  Problem: CAD.   ·  Plan: Recent PCI to pLAD in June 2023  - Will review ECG  - Trop/CKMB as noted above  - c/w ASA, Plavix and statin  - TTE pending     Problem/Plan - 4:  ·  Problem: Prophylactic measure.   ·  Plan: dvt ppx: Lovenox.          Differential diagnosis and plan of care discussed with patient after the evaluation. Counseling on diet, nutritional counseling, weight management, exercise and medication compliance was done.   Advanced care planning/advanced directives discussed with patient/family. DNR status including forceful chest compressions to attempt to restart the heart, ventilator support/artificial breathing, electric shock, artificial nutrition, health care proxy, Molst form all discussed with pt. Pt wishes to consider. More than fifteen minutes spent on discussing advanced directives.     Sulma SEGURA-BC  Hank Hayes DO [facility]  Cardiovascular Medicine  [address] [place] [address] Dr, [address]  Available for call or text via [employer] TEAM[employer]  Office [phone]

## 2023-10-24 NOTE — CONSULT NOTE ADULT - SUBJECTIVE AND OBJECTIVE BOX
Reason for consult: nonfunctional metastatic pancreatic neuroendocrine tumor    HPI:  78 M with hx of CAD s/p NURIA to distal LAD in 6/2023, HTN, HLD, EtOH disorder, T2DM, spinal surgeries, neuroendocrine tumor on Lutathera (first treatment on Friday 10/20 at [facility] [place]), presenting with fatigue and chest pain since Friday. Patient reports severe fatigue starting Friday after his first treatment with Lutathera. Pt also reported mid-left lower chest pain described as pressure. No radiation, no aggravating or alleviating factors. Pt has never felt this type of chest pain before. Denies palpitations, diaphoresis, dyspnea, nausea, vomiting. Pt has diffuse abdominal pain. Also with constipation; last BM several days ago. No other acute complaints. In ED, patient developed fever with Tmax 100.7. CT imaging negative for PE but showed interval worsening of metastatic hepatic lesions. UA unremarkable. Troponin 73 --> 82, EKG showing sinus tachycardia with PACs. Pt given cefepime in ED.  (23 Oct 2023 23:34)    Hematology/Oncology called to see patient who follows with [facility] for the treatment of neuroendocrine tumor.    PAST MEDICAL & SURGICAL HISTORY:  Hypertension      Hypercholesterolemia      PAD (peripheral artery disease)      Neuroendocrine carcinoma      S/P knee replacement, bilateral      S/P hip replacement  right hip      History of hernia repair      H/O laminectomy      History of lumbosacral spine surgery          FAMILY HISTORY:      Alochol: Denied  Smoking: Nonsmoker  Drug Use: Denied  Marital Status:         Allergies    No Known Allergies    Intolerances        MEDICATIONS  (STANDING):  allopurinol 200 milliGRAM(s) Oral daily  aspirin enteric coated 81 milliGRAM(s) Oral daily  atorvastatin 80 milliGRAM(s) Oral at bedtime  cefTRIAXone   IVPB 1000 milliGRAM(s) IV Intermittent every 24 hours  clopidogrel Tablet 75 milliGRAM(s) Oral daily  dextrose 5%. 1000 milliLiter(s) (100 mL/Hr) IV Continuous <Continuous>  dextrose 5%. 1000 milliLiter(s) (50 mL/Hr) IV Continuous <Continuous>  dextrose 50% Injectable 25 Gram(s) IV Push once  dextrose 50% Injectable 25 Gram(s) IV Push once  dextrose 50% Injectable 12.5 Gram(s) IV Push once  enoxaparin Injectable 40 milliGRAM(s) SubCutaneous every 24 hours  glucagon  Injectable 1 milliGRAM(s) IntraMuscular once  insulin lispro (ADMELOG) corrective regimen sliding scale   SubCutaneous at bedtime  insulin lispro (ADMELOG) corrective regimen sliding scale   SubCutaneous three times a day before meals  metoprolol succinate ER 25 milliGRAM(s) Oral daily  pantoprazole    Tablet 40 milliGRAM(s) Oral before breakfast  polyethylene glycol 3350 17 Gram(s) Oral daily  senna 2 Tablet(s) Oral at bedtime    MEDICATIONS  (PRN):  acetaminophen     Tablet .. 975 milliGRAM(s) Oral every 6 hours PRN Temp greater or equal to 38C (100.4F), Moderate Pain (4 - 6)  dextrose Oral Gel 15 Gram(s) Oral once PRN Blood Glucose LESS THAN 70 milliGRAM(s)/deciliter  oxyCODONE    IR 5 milliGRAM(s) Oral every 6 hours PRN Severe Pain (7 - 10)      T(C): 36.8 (10-24-23 @ 04:27), Max: 38.2 (10-23-23 @ 16:06)  HR: 113 (10-24-23 @ 04:27) (87 - 118)  BP: 120/62 (10-24-23 @ 04:27) (110/70 - 124/63)  RR: 18 (10-24-23 @ 04:27) (14 - 19)  SpO2: 96% (10-24-23 @ 04:27) (96% - 98%)  Wt(kg): --    PE  Awake, alert  Anicteric, MMM  RRR  CTAB  Abd soft, NT, ND  No c/c/e  No rash grossly  FROM                          12.2   13.37 )-----------( 240      ( 24 Oct 2023 06:44 )             37.8       10-24    138  |  104  |  29<H>  ----------------------------<  164<H>  4.6   |  20<L>  |  1.15    Ca    9.3      24 Oct 2023 06:44    TPro  6.6  /  Alb  3.2<L>  /  TBili  0.6  /  DBili  x   /  AST  133<H>  /  ALT  68<H>  /  AlkPhos  137<H>  10-24    ACC: [id number] EXAM:  CT ABDOMEN AND PELVIS IC   ORDERED BY: AZALEA PÉREZ     ACC: [id number] EXAM:  CT ANGIO CHEST PULM ART WAWIC   ORDERED BY:  VIKTORIA BISHOP     PROCEDURE DATE:  10/23/2023          INTERPRETATION:  CLINICAL INFORMATION: Chest pain and shortness of   breath. History of metastatic neuroendocrine cancer.    COMPARISON: CT chest 8/16/2023    CONTRAST/COMPLICATIONS:  IV Contrast: Omnipaque 350  90 cc administered   10 cc discarded  Oral Contrast: NONE  Complications: None reported at time of study completion    PROCEDURE:  CT Angiography of the Chest was performed followed by portal venous phase   imaging of the Abdomen and Pelvis.  Sagittal and coronal reformats were performed as well as 3D (MIP)   reconstructions.    FINDINGS:    Pulmonary angiogram: No pulmonary embolism to the level of the proximal   segmental arteries. Evaluation of the distal branches is limited due to   suboptimal arterial opacification and respiratory motion artifact.  LUNGS AND AIRWAYS: Patent central airways.  Bibasilar dependent   atelectasis.  PLEURA: No pleural effusion.  MEDIASTINUM AND LIV: No lymphadenopathy.  VESSELS: Aortic and coronary artery calcifications. The ascending   thoracic aorta measures 4.3 cm at the level of the main pulmonary artery.  HEART: Heart size is normal. No pericardial effusion.  CHEST WALL AND LOWER NECK: Within normal limits.  VISUALIZED UPPER ABDOMEN: Within normal limits.  BONES: Within normal limits.    ABDOMEN AND PELVIS:  Streak artifact from spinal hardware limits evaluation of the surrounding   structures.    LIVER: Nodular contour. Multiple hypodense lesions, which appear to have   increased in size and number since 8/16/2023. For example, a right   hepatic lobe lesion measures 2.8 x 2.5 cm, which was not evident on prior   study.  BILE DUCTS: Normal caliber.  GALLBLADDER: Within normal limits.  SPLEEN: Within normal limits.  PANCREAS: Within normal limits.  ADRENALS: Within normal limits.  KIDNEYS/URETERS: No hydronephrosis. Right renal cysts and additional   bilateral subcentimeter hypodense foci, too small to characterize. 7 mm   nonobstructing left renal calculus and punctate nonobstructing right   renal calculi.    BLADDER: Redemonstrated two large calculi measuring up to 3.3 cm.  REPRODUCTIVE ORGANS: Prostate is enlarged.    BOWEL: No bowel obstruction. Appendix is not visualized.  PERITONEUM: No ascites.  VESSELS: Atherosclerotic changes.  RETROPERITONEUM/LYMPH NODES: No lymphadenopathy.  ABDOMINAL WALL: Postsurgical changes. Small left fat-containing inguinal   hernia.  BONES: Thoracolumbosacral spinal fusion and right total hip arthroplasty.   Multilevel laminectomies.    IMPRESSION:  No pulmonary embolism. Evaluation of the segmental/subsegmental arteries   is limited due to suboptimal arterial opacification and respiratory   motion artifact.    Multiple metastatic hepatic lesions, increased in size and number since   8/16/2023.    --- End of Report ---

## 2023-10-24 NOTE — PROGRESS NOTE ADULT - ASSESSMENT
78 M with hx of CAD s/p NURIA to distal LAD in 6/2023, HTN, HLD, EtOH disorder, T2DM, spinal surgeries, neuroendocrine tumor on Lutathera (first treatment on Friday 10/20 at [facility] [place] presenting with fatigue and chest pain, fever + SIRS criteria. Concern for reaction to Lutathera vs progression of disease given start of symptoms

## 2023-10-24 NOTE — PROGRESS NOTE ADULT - PROBLEM SELECTOR PLAN 1
concern for treatment reaction vs progression of disease, +/- infectious cause, +/- cardiac less likely  fever present  infectious w/u and empiric abx  onc eval  pain control

## 2023-10-24 NOTE — PROGRESS NOTE ADULT - PROBLEM SELECTOR PLAN 2
Hx of chronic stable angina Chest pain atypical but patient with know cardiac risk factors and has prior history of CAD with stent placement. Current pain is different from prior chest pains.   elevated troponin  cards appreciated - doubt cardiac chest pain, think more likely from liver mets or treatment reaction as above

## 2023-10-25 LAB
A1C WITH ESTIMATED AVERAGE GLUCOSE RESULT: 6.7 % — HIGH (ref 4–5.6)
A1C WITH ESTIMATED AVERAGE GLUCOSE RESULT: 6.7 % — HIGH (ref 4–5.6)
ALBUMIN SERPL ELPH-MCNC: 2.7 G/DL — LOW (ref 3.3–5)
ALBUMIN SERPL ELPH-MCNC: 2.7 G/DL — LOW (ref 3.3–5)
ALP SERPL-CCNC: 119 U/L — SIGNIFICANT CHANGE UP (ref 40–120)
ALP SERPL-CCNC: 119 U/L — SIGNIFICANT CHANGE UP (ref 40–120)
ALT FLD-CCNC: 48 U/L — HIGH (ref 10–45)
ALT FLD-CCNC: 48 U/L — HIGH (ref 10–45)
ANION GAP SERPL CALC-SCNC: 15 MMOL/L — SIGNIFICANT CHANGE UP (ref 5–17)
ANION GAP SERPL CALC-SCNC: 15 MMOL/L — SIGNIFICANT CHANGE UP (ref 5–17)
AST SERPL-CCNC: 63 U/L — HIGH (ref 10–40)
AST SERPL-CCNC: 63 U/L — HIGH (ref 10–40)
BASOPHILS # BLD AUTO: 0.01 K/UL — SIGNIFICANT CHANGE UP (ref 0–0.2)
BASOPHILS # BLD AUTO: 0.01 K/UL — SIGNIFICANT CHANGE UP (ref 0–0.2)
BASOPHILS NFR BLD AUTO: 0.1 % — SIGNIFICANT CHANGE UP (ref 0–2)
BASOPHILS NFR BLD AUTO: 0.1 % — SIGNIFICANT CHANGE UP (ref 0–2)
BILIRUB SERPL-MCNC: 0.6 MG/DL — SIGNIFICANT CHANGE UP (ref 0.2–1.2)
BILIRUB SERPL-MCNC: 0.6 MG/DL — SIGNIFICANT CHANGE UP (ref 0.2–1.2)
BUN SERPL-MCNC: 28 MG/DL — HIGH (ref 7–23)
BUN SERPL-MCNC: 28 MG/DL — HIGH (ref 7–23)
CALCIUM SERPL-MCNC: 9 MG/DL — SIGNIFICANT CHANGE UP (ref 8.4–10.5)
CALCIUM SERPL-MCNC: 9 MG/DL — SIGNIFICANT CHANGE UP (ref 8.4–10.5)
CHLORIDE SERPL-SCNC: 102 MMOL/L — SIGNIFICANT CHANGE UP (ref 96–108)
CHLORIDE SERPL-SCNC: 102 MMOL/L — SIGNIFICANT CHANGE UP (ref 96–108)
CO2 SERPL-SCNC: 19 MMOL/L — LOW (ref 22–31)
CO2 SERPL-SCNC: 19 MMOL/L — LOW (ref 22–31)
CREAT SERPL-MCNC: 1.21 MG/DL — SIGNIFICANT CHANGE UP (ref 0.5–1.3)
CREAT SERPL-MCNC: 1.21 MG/DL — SIGNIFICANT CHANGE UP (ref 0.5–1.3)
CULTURE RESULTS: SIGNIFICANT CHANGE UP
CULTURE RESULTS: SIGNIFICANT CHANGE UP
EGFR: 61 ML/MIN/1.73M2 — SIGNIFICANT CHANGE UP
EGFR: 61 ML/MIN/1.73M2 — SIGNIFICANT CHANGE UP
EOSINOPHIL # BLD AUTO: 0.02 K/UL — SIGNIFICANT CHANGE UP (ref 0–0.5)
EOSINOPHIL # BLD AUTO: 0.02 K/UL — SIGNIFICANT CHANGE UP (ref 0–0.5)
EOSINOPHIL NFR BLD AUTO: 0.2 % — SIGNIFICANT CHANGE UP (ref 0–6)
EOSINOPHIL NFR BLD AUTO: 0.2 % — SIGNIFICANT CHANGE UP (ref 0–6)
ESTIMATED AVERAGE GLUCOSE: 146 MG/DL — HIGH (ref 68–114)
ESTIMATED AVERAGE GLUCOSE: 146 MG/DL — HIGH (ref 68–114)
GLUCOSE BLDC GLUCOMTR-MCNC: 142 MG/DL — HIGH (ref 70–99)
GLUCOSE BLDC GLUCOMTR-MCNC: 146 MG/DL — HIGH (ref 70–99)
GLUCOSE BLDC GLUCOMTR-MCNC: 146 MG/DL — HIGH (ref 70–99)
GLUCOSE BLDC GLUCOMTR-MCNC: 154 MG/DL — HIGH (ref 70–99)
GLUCOSE BLDC GLUCOMTR-MCNC: 154 MG/DL — HIGH (ref 70–99)
GLUCOSE BLDC GLUCOMTR-MCNC: 159 MG/DL — HIGH (ref 70–99)
GLUCOSE BLDC GLUCOMTR-MCNC: 159 MG/DL — HIGH (ref 70–99)
GLUCOSE SERPL-MCNC: 140 MG/DL — HIGH (ref 70–99)
GLUCOSE SERPL-MCNC: 140 MG/DL — HIGH (ref 70–99)
HCT VFR BLD CALC: 35.7 % — LOW (ref 39–50)
HCT VFR BLD CALC: 35.7 % — LOW (ref 39–50)
HGB BLD-MCNC: 11.5 G/DL — LOW (ref 13–17)
HGB BLD-MCNC: 11.5 G/DL — LOW (ref 13–17)
IMM GRANULOCYTES NFR BLD AUTO: 0.6 % — SIGNIFICANT CHANGE UP (ref 0–0.9)
IMM GRANULOCYTES NFR BLD AUTO: 0.6 % — SIGNIFICANT CHANGE UP (ref 0–0.9)
LYMPHOCYTES # BLD AUTO: 0.53 K/UL — LOW (ref 1–3.3)
LYMPHOCYTES # BLD AUTO: 0.53 K/UL — LOW (ref 1–3.3)
LYMPHOCYTES # BLD AUTO: 4.2 % — LOW (ref 13–44)
LYMPHOCYTES # BLD AUTO: 4.2 % — LOW (ref 13–44)
MAGNESIUM SERPL-MCNC: 1.8 MG/DL — SIGNIFICANT CHANGE UP (ref 1.6–2.6)
MAGNESIUM SERPL-MCNC: 1.8 MG/DL — SIGNIFICANT CHANGE UP (ref 1.6–2.6)
MCHC RBC-ENTMCNC: 29.7 PG — SIGNIFICANT CHANGE UP (ref 27–34)
MCHC RBC-ENTMCNC: 29.7 PG — SIGNIFICANT CHANGE UP (ref 27–34)
MCHC RBC-ENTMCNC: 32.2 GM/DL — SIGNIFICANT CHANGE UP (ref 32–36)
MCHC RBC-ENTMCNC: 32.2 GM/DL — SIGNIFICANT CHANGE UP (ref 32–36)
MCV RBC AUTO: 92.2 FL — SIGNIFICANT CHANGE UP (ref 80–100)
MCV RBC AUTO: 92.2 FL — SIGNIFICANT CHANGE UP (ref 80–100)
MONOCYTES # BLD AUTO: 1.23 K/UL — HIGH (ref 0–0.9)
MONOCYTES # BLD AUTO: 1.23 K/UL — HIGH (ref 0–0.9)
MONOCYTES NFR BLD AUTO: 9.8 % — SIGNIFICANT CHANGE UP (ref 2–14)
MONOCYTES NFR BLD AUTO: 9.8 % — SIGNIFICANT CHANGE UP (ref 2–14)
NEUTROPHILS # BLD AUTO: 10.73 K/UL — HIGH (ref 1.8–7.4)
NEUTROPHILS # BLD AUTO: 10.73 K/UL — HIGH (ref 1.8–7.4)
NEUTROPHILS NFR BLD AUTO: 85.1 % — HIGH (ref 43–77)
NEUTROPHILS NFR BLD AUTO: 85.1 % — HIGH (ref 43–77)
NRBC # BLD: 0 /100 WBCS — SIGNIFICANT CHANGE UP (ref 0–0)
NRBC # BLD: 0 /100 WBCS — SIGNIFICANT CHANGE UP (ref 0–0)
PHOSPHATE SERPL-MCNC: 2.7 MG/DL — SIGNIFICANT CHANGE UP (ref 2.5–4.5)
PHOSPHATE SERPL-MCNC: 2.7 MG/DL — SIGNIFICANT CHANGE UP (ref 2.5–4.5)
PLATELET # BLD AUTO: 230 K/UL — SIGNIFICANT CHANGE UP (ref 150–400)
PLATELET # BLD AUTO: 230 K/UL — SIGNIFICANT CHANGE UP (ref 150–400)
POTASSIUM SERPL-MCNC: 4.3 MMOL/L — SIGNIFICANT CHANGE UP (ref 3.5–5.3)
POTASSIUM SERPL-MCNC: 4.3 MMOL/L — SIGNIFICANT CHANGE UP (ref 3.5–5.3)
POTASSIUM SERPL-SCNC: 4.3 MMOL/L — SIGNIFICANT CHANGE UP (ref 3.5–5.3)
POTASSIUM SERPL-SCNC: 4.3 MMOL/L — SIGNIFICANT CHANGE UP (ref 3.5–5.3)
PROT SERPL-MCNC: 6.4 G/DL — SIGNIFICANT CHANGE UP (ref 6–8.3)
PROT SERPL-MCNC: 6.4 G/DL — SIGNIFICANT CHANGE UP (ref 6–8.3)
RBC # BLD: 3.87 M/UL — LOW (ref 4.2–5.8)
RBC # BLD: 3.87 M/UL — LOW (ref 4.2–5.8)
RBC # FLD: 14.6 % — HIGH (ref 10.3–14.5)
RBC # FLD: 14.6 % — HIGH (ref 10.3–14.5)
SODIUM SERPL-SCNC: 136 MMOL/L — SIGNIFICANT CHANGE UP (ref 135–145)
SODIUM SERPL-SCNC: 136 MMOL/L — SIGNIFICANT CHANGE UP (ref 135–145)
SPECIMEN SOURCE: SIGNIFICANT CHANGE UP
SPECIMEN SOURCE: SIGNIFICANT CHANGE UP
TROPONIN T, HIGH SENSITIVITY RESULT: 110 NG/L — HIGH (ref 0–51)
TROPONIN T, HIGH SENSITIVITY RESULT: 110 NG/L — HIGH (ref 0–51)
WBC # BLD: 12.59 K/UL — HIGH (ref 3.8–10.5)
WBC # BLD: 12.59 K/UL — HIGH (ref 3.8–10.5)
WBC # FLD AUTO: 12.59 K/UL — HIGH (ref 3.8–10.5)
WBC # FLD AUTO: 12.59 K/UL — HIGH (ref 3.8–10.5)

## 2023-10-25 PROCEDURE — 93010 ELECTROCARDIOGRAM REPORT: CPT

## 2023-10-25 PROCEDURE — 99233 SBSQ HOSP IP/OBS HIGH 50: CPT

## 2023-10-25 PROCEDURE — 93306 TTE W/DOPPLER COMPLETE: CPT | Mod: 26

## 2023-10-25 RX ORDER — CHLORHEXIDINE GLUCONATE 213 G/1000ML
1 SOLUTION TOPICAL DAILY
Refills: 0 | Status: DISCONTINUED | OUTPATIENT
Start: 2023-10-25 | End: 2023-11-04

## 2023-10-25 RX ADMIN — Medication 81 MILLIGRAM(S): at 12:46

## 2023-10-25 RX ADMIN — Medication 25 MILLIGRAM(S): at 04:54

## 2023-10-25 RX ADMIN — ATORVASTATIN CALCIUM 80 MILLIGRAM(S): 80 TABLET, FILM COATED ORAL at 21:13

## 2023-10-25 RX ADMIN — PANTOPRAZOLE SODIUM 40 MILLIGRAM(S): 20 TABLET, DELAYED RELEASE ORAL at 04:54

## 2023-10-25 RX ADMIN — CLOPIDOGREL BISULFATE 75 MILLIGRAM(S): 75 TABLET, FILM COATED ORAL at 12:47

## 2023-10-25 RX ADMIN — Medication 200 MILLIGRAM(S): at 12:46

## 2023-10-25 RX ADMIN — POLYETHYLENE GLYCOL 3350 17 GRAM(S): 17 POWDER, FOR SOLUTION ORAL at 12:47

## 2023-10-25 RX ADMIN — ENOXAPARIN SODIUM 40 MILLIGRAM(S): 100 INJECTION SUBCUTANEOUS at 04:55

## 2023-10-25 RX ADMIN — Medication 1: at 16:39

## 2023-10-25 RX ADMIN — CHLORHEXIDINE GLUCONATE 1 APPLICATION(S): 213 SOLUTION TOPICAL at 16:04

## 2023-10-25 RX ADMIN — CEFTRIAXONE 100 MILLIGRAM(S): 500 INJECTION, POWDER, FOR SOLUTION INTRAMUSCULAR; INTRAVENOUS at 04:54

## 2023-10-25 NOTE — PROGRESS NOTE ADULT - SUBJECTIVE AND OBJECTIVE BOX
[facility] Division of Hospital Medicine  Stevenson Wilson MD  Contact M-F, 8A-5P through [employer] Teams  Other times, contact Hospitalist on call    Patient is a 78y old  Male who presents with a chief complaint of Chest pain (25 Oct 2023 11:56)    SUBJECTIVE / OVERNIGHT EVENTS: still has pain.   ADDITIONAL REVIEW OF SYSTEMS:    MEDICATIONS  (STANDING):  allopurinol 200 milliGRAM(s) Oral daily  aspirin enteric coated 81 milliGRAM(s) Oral daily  atorvastatin 80 milliGRAM(s) Oral at bedtime  chlorhexidine 2% Cloths 1 Application(s) Topical daily  clopidogrel Tablet 75 milliGRAM(s) Oral daily  dextrose 5%. 1000 milliLiter(s) (100 mL/Hr) IV Continuous <Continuous>  dextrose 5%. 1000 milliLiter(s) (50 mL/Hr) IV Continuous <Continuous>  dextrose 50% Injectable 25 Gram(s) IV Push once  dextrose 50% Injectable 12.5 Gram(s) IV Push once  dextrose 50% Injectable 25 Gram(s) IV Push once  enoxaparin Injectable 40 milliGRAM(s) SubCutaneous every 24 hours  glucagon  Injectable 1 milliGRAM(s) IntraMuscular once  insulin lispro (ADMELOG) corrective regimen sliding scale   SubCutaneous at bedtime  insulin lispro (ADMELOG) corrective regimen sliding scale   SubCutaneous three times a day before meals  metoprolol succinate ER 25 milliGRAM(s) Oral daily  pantoprazole    Tablet 40 milliGRAM(s) Oral before breakfast  polyethylene glycol 3350 17 Gram(s) Oral daily  senna 2 Tablet(s) Oral at bedtime    MEDICATIONS  (PRN):  acetaminophen     Tablet .. 975 milliGRAM(s) Oral every 6 hours PRN Temp greater or equal to 38C (100.4F), Moderate Pain (4 - 6)  dextrose Oral Gel 15 Gram(s) Oral once PRN Blood Glucose LESS THAN 70 milliGRAM(s)/deciliter  oxyCODONE    IR 5 milliGRAM(s) Oral every 6 hours PRN Severe Pain (7 - 10)      CAPILLARY BLOOD GLUCOSE      POCT Blood Glucose.: 142 mg/dL (25 Oct 2023 11:53)  POCT Blood Glucose.: 146 mg/dL (25 Oct 2023 09:24)  POCT Blood Glucose.: 154 mg/dL (25 Oct 2023 07:36)  POCT Blood Glucose.: 133 mg/dL (24 Oct 2023 21:38)  POCT Blood Glucose.: 139 mg/dL (24 Oct 2023 16:38)    I&O's Summary    24 Oct 2023 07:01  -  25 Oct 2023 07:00  --------------------------------------------------------  IN: 200 mL / OUT: 1100 mL / NET: -900 mL        PHYSICAL EXAM:  Vital Signs Last 24 Hrs  T(C): 36.7 (25 Oct 2023 05:25), Max: 37.2 (24 Oct 2023 17:34)  T(F): 98 (25 Oct 2023 05:25), Max: 99 (25 Oct 2023 00:23)  HR: 98 (25 Oct 2023 05:25) (82 - 98)  BP: 144/65 (25 Oct 2023 05:25) (100/63 - 144/65)  BP(mean): --  RR: 18 (25 Oct 2023 05:25) (18 - 18)  SpO2: 95% (25 Oct 2023 05:25) (95% - 97%)    Parameters below as of 25 Oct 2023 05:25  Patient On (Oxygen Delivery Method): room air    CONSTITUTIONAL: Well-groomed, in no apparent distress  EYES: No conjunctival or scleral injection, non-icteric; PERRLA and symmetric  ENMT: No external nasal lesions; nasal mucosa not inflamed; no pharyngeal injection or exudates, oral mucosa with moist membranes  RESPIRATORY: Breathing comfortably; lungs CTA without wheeze/rhonchi/rales  CARDIOVASCULAR: Tachycardic, irregular rhythm, +S1S2, no M/G/R; pedal pulses full and symmetric; trace lower extremity edema; lower chest pain reproducible with palpation.  GASTROINTESTINAL: moderate tenderness primarily epigastric and RUQ with voluntary guarding, No palpable masses, +BS throughout, no rebound no hepatosplenomegaly; no hernia palpated  LYMPHATIC: No cervical LAD or tenderness  MUSCULOSKELETAL: No malalignment of extremities; no joint swelling or tenderness  SKIN: No rashes or ulcers noted; no subcutaneous nodules or induration palpable  NEUROLOGIC: CN grossly intact; sensation intact in LEs b/l to light touch; normal strength and tone  PSYCHIATRIC: A+O x 3; mood and affect appropriate; appropriate insight and judgment    LABS:                        11.5   12.59 )-----------( 230      ( 25 Oct 2023 06:57 )             35.7     10-25    136  |  102  |  28<H>  ----------------------------<  140<H>  4.3   |  19<L>  |  1.21    Ca    9.0      25 Oct 2023 06:57  Phos  2.7     10-25  Mg     1.8     10-25    TPro  6.4  /  Alb  2.7<L>  /  TBili  0.6  /  DBili  x   /  AST  63<H>  /  ALT  48<H>  /  AlkPhos  119  10-25    PT/INR - ( 23 Oct 2023 16:47 )   PT: 13.3 sec;   INR: 1.22 ratio         PTT - ( 23 Oct 2023 16:47 )  PTT:29.0 sec  CARDIAC MARKERS ( 24 Oct 2023 01:37 )  x     / x     / 465 U/L / x     / 1.9 ng/mL      Urinalysis Basic - ( 25 Oct 2023 06:57 )    Color: x / Appearance: x / SG: x / pH: x  Gluc: 140 mg/dL / Ketone: x  / Bili: x / Urobili: x   Blood: x / Protein: x / Nitrite: x   Leuk Esterase: x / RBC: x / WBC x   Sq Epi: x / Non Sq Epi: x / Bacteria: x        Culture - Urine (collected 23 Oct 2023 20:12)  Source: Clean Catch Clean Catch (Midstream)  Final Report (25 Oct 2023 09:38):    <10,000 CFU/mL Normal Urogenital [place]    Culture - Blood (collected 23 Oct 2023 17:45)  Source: .Blood Blood-Peripheral  Preliminary Report (24 Oct 2023 22:02):    No growth at 24 hours    Culture - Blood (collected 23 Oct 2023 17:30)  Source: .Blood Blood-Peripheral  Preliminary Report (24 Oct 2023 22:02):    No growth at 24 hours          RADIOLOGY & ADDITIONAL TESTS:  Results Reviewed:   Imaging Personally Reviewed: < from: TTE W or WO Ultrasound Enhancing Agent (10.25.23 @ 08:30) >   1. Left ventricular cavity is normal.Left ventricular wall thickness is mildly increased. Left ventricular systolic function is normal with an ejection fraction of 64 % by Avila's method of disks. There are no regional wall motion abnormalities seen.   2. Normal left ventricular diastolic function, with normal filling pressure.   3. Normal right ventricular cavity size, wall thickness, and systolic function.   4. The left atrium is moderately dilated in size.   5. No significant valvular disease.   6. No pericardial effusion seen.   7. No prior echocardiogram is available for comparison.    < end of copied text >    Electrocardiogram Personally Reviewed:    COORDINATION OF CARE:  Care Discussed with Consultants/Other Providers [Y/N]:  Prior or Outpatient Records Reviewed [Y/N]:

## 2023-10-25 NOTE — PROGRESS NOTE ADULT - SUBJECTIVE AND OBJECTIVE BOX
DATE OF SERVICE: 10-25-23 @ 11:57    Patient is a 78y old  Male who presents with a chief complaint of Chest pain (25 Oct 2023 10:57)      INTERVAL HISTORY: Feels ok.     REVIEW OF SYSTEMS:  CONSTITUTIONAL: No weakness  EYES/ENT: No visual changes;  No throat pain   NECK: No pain or stiffness  RESPIRATORY: No cough, wheezing; No shortness of breath  CARDIOVASCULAR: No chest pain or palpitations  GASTROINTESTINAL: No abdominal  pain. No nausea, vomiting, or hematemesis  GENITOURINARY: No dysuria, frequency or hematuria  NEUROLOGICAL: No stroke like symptoms  SKIN: No rashes    TELEMETRY Personally reviewed: SR  PAC/PVCs  	  MEDICATIONS:  metoprolol succinate ER 25 milliGRAM(s) Oral daily        PHYSICAL EXAM:  T(C): 36.7 (10-25-23 @ 05:25), Max: 37.2 (10-24-23 @ 17:34)  HR: 98 (10-25-23 @ 05:25) (82 - 98)  BP: 144/65 (10-25-23 @ 05:25) (100/63 - 144/65)  RR: 18 (10-25-23 @ 05:25) (18 - 18)  SpO2: 95% (10-25-23 @ 05:25) (95% - 97%)  Wt(kg): --  I&O's Summary    24 Oct 2023 07:01  -  25 Oct 2023 07:00  --------------------------------------------------------  IN: 200 mL / OUT: 1100 mL / NET: -900 mL          Appearance: In no distress	  HEENT:    PERRL, EOMI	  Cardiovascular:  S1 S2, No JVD  Respiratory: Lungs clear to auscultation	  Gastrointestinal:  Soft, Non-tender, + BS	  Vascularature:  No edema of LE  Psychiatric: Appropriate affect   Neuro: no acute focal deficits                               11.5   12.59 )-----------( 230      ( 25 Oct 2023 06:57 )             35.7     10-25    136  |  102  |  28<H>  ----------------------------<  140<H>  4.3   |  19<L>  |  1.21    Ca    9.0      25 Oct 2023 06:57  Phos  2.7     10-25  Mg     1.8     10-25    TPro  6.4  /  Alb  2.7<L>  /  TBili  0.6  /  DBili  x   /  AST  63<H>  /  ALT  48<H>  /  AlkPhos  119  10-25        Labs personally reviewed      ASSESSMENT/PLAN: 	      78 M with hx of CAD s/p NURIA to distal LAD in 6/2023, HTN, HLD, EtOH disorder, T2DM, spinal surgeries, neuroendocrine tumor on Lutathera (first [place] [facility] [place] presenting with fatigue and chest pain since Friday. Patient reports severe fatigue starting Friday after his first treatment with Lutathera. Pt also reported mid-left lower chest pain described as pressure. No radiation, no aggravating or alleviating factors. Pt has never felt this type of chest pain before. Denies palpitations, diaphoresis, dyspnea, nausea, vomiting. Pt has diffuse abdominal pain. Also with constipation; last BM several days ago. No other acute complaints. In ED, patient developed fever with Tmax 100.7. CT imaging negative for PE but showed interval worsening of metastatic hepatic lesions. UA unremarkable. Troponin 73 --> 82, EKG showing sinus tachycardia with PACs. Pt given cefepime in ED.     Problem/Plan - 1:  ·  Problem: Chest Pain.   ·  Plan: Location described as LUQ abdomen with pain elicited with palpation, appears noncardiac   - EKG SR with PACs, no ischemic characteristics  - Trop 72-->83--> 81/ CKMB 1.9  - Mildly elevated LFTS and Lipase noted  - Hx of recent PCI  - Repeat TTE to assess LVEF (previously normal in June 2023) and r/o WMA  - CP possibly 2/2 lutathera with known S/E of abdominal discomfort       Problem/Plan - 2:  ·  Problem: Shortness of Breath  ·  Plan: ECG non-ischemic  - TTE pending.  - BNP 2572  - CT neg for PE, no pulm edema noted     Problem/Plan - 3:  ·  Problem: CAD.   ·  Plan: Recent PCI to pLAD in June 2023  - Will review ECG  - Trop/CKMB as noted above  - c/w ASA, Plavix and statin  - TTE pending     Problem/Plan - 4:  ·  Problem: Prophylactic measure.   ·  Plan: dvt ppx: Lovenox.        ROSIO Latham-ESTEFANI Hayes DO [facility]  Cardiovascular Medicine  [address] [place] [address], [address]  Available through call or text on [employer]  Office: [phone]   DATE OF SERVICE: 10-25-23 @ 11:57    Patient is a 78y old  Male who presents with a chief complaint of Chest pain (25 Oct 2023 10:57)      INTERVAL HISTORY: Feels ok.     REVIEW OF SYSTEMS:  CONSTITUTIONAL: No weakness  EYES/ENT: No visual changes;  No throat pain   NECK: No pain or stiffness  RESPIRATORY: No cough, wheezing; No shortness of breath  CARDIOVASCULAR: No chest pain or palpitations  GASTROINTESTINAL: No abdominal  pain. No nausea, vomiting, or hematemesis  GENITOURINARY: No dysuria, frequency or hematuria  NEUROLOGICAL: No stroke like symptoms  SKIN: No rashes    TELEMETRY Personally reviewed: SR  PAC/PVCs  	  MEDICATIONS:  metoprolol succinate ER 25 milliGRAM(s) Oral daily        PHYSICAL EXAM:  T(C): 36.7 (10-25-23 @ 05:25), Max: 37.2 (10-24-23 @ 17:34)  HR: 98 (10-25-23 @ 05:25) (82 - 98)  BP: 144/65 (10-25-23 @ 05:25) (100/63 - 144/65)  RR: 18 (10-25-23 @ 05:25) (18 - 18)  SpO2: 95% (10-25-23 @ 05:25) (95% - 97%)  Wt(kg): --  I&O's Summary    24 Oct 2023 07:01  -  25 Oct 2023 07:00  --------------------------------------------------------  IN: 200 mL / OUT: 1100 mL / NET: -900 mL          Appearance: In no distress	  HEENT:    PERRL, EOMI	  Cardiovascular:  S1 S2, No JVD  Respiratory: Lungs clear to auscultation	  Gastrointestinal:  Soft, Non-tender, + BS	  Vascularature:  No edema of LE  Psychiatric: Appropriate affect   Neuro: no acute focal deficits                               11.5   12.59 )-----------( 230      ( 25 Oct 2023 06:57 )             35.7     10-25    136  |  102  |  28<H>  ----------------------------<  140<H>  4.3   |  19<L>  |  1.21    Ca    9.0      25 Oct 2023 06:57  Phos  2.7     10-25  Mg     1.8     10-25    TPro  6.4  /  Alb  2.7<L>  /  TBili  0.6  /  DBili  x   /  AST  63<H>  /  ALT  48<H>  /  AlkPhos  119  10-25        Labs personally reviewed      ASSESSMENT/PLAN: 	  78 M with hx of CAD s/p NURIA to distal LAD in 6/2023, HTN, HLD, EtOH disorder, T2DM, spinal surgeries, neuroendocrine tumor on Lutathera (first treatment on Friday 10/20 at [facility] [place]), presenting with fatigue and chest pain since Friday. Patient reports severe fatigue starting Friday after his first treatment with Lutathera. Pt also reported mid-left lower chest pain described as pressure. No radiation, no aggravating or alleviating factors. Pt has never felt this type of chest pain before. Denies palpitations, diaphoresis, dyspnea, nausea, vomiting. Pt has diffuse abdominal pain. Also with constipation; last BM several days ago. No other acute complaints. In ED, patient developed fever with Tmax 100.7. CT imaging negative for PE but showed interval worsening of metastatic hepatic lesions. UA unremarkable. Troponin 73 --> 82, EKG showing sinus tachycardia with PACs. Pt given cefepime in ED.     Problem/Plan - 1:  ·  Problem: Chest Pain.   ·  Plan: Location described as LUQ abdomen with pain elicited with palpation, appears noncardiac   - EKG SR with PACs, no ischemic characteristics  - Trop 72-->83--> 81/ CKMB 1.9  - Mildly elevated LFTS and Lipase noted  - Hx of recent PCI  - Repeat TTE unchanged   - CP possibly 2/2 lutathera with known S/E of abdominal discomfort       Problem/Plan - 2:  ·  Problem: Shortness of Breath  ·  Plan: ECG non-ischemic  - TTE pending.  - BNP 2572  - CT neg for PE, no pulm edema noted     Problem/Plan - 3:  ·  Problem: CAD.   ·  Plan: Recent PCI to pLAD in June 2023  - Will review ECG  - Trop/CKMB as noted above  - c/w ASA, Plavix and statin  - TTE unchanged     Problem/Plan - 4:  ·  Problem: Prophylactic measure.   ·  Plan: dvt ppx: Lovenox.        KIRSTIE Latham,  [facility]  Cardiovascular Medicine  [address] [place] [address], [address]  Available through call or text on [employer]  Office: [phone]

## 2023-10-25 NOTE — PROGRESS NOTE ADULT - PROBLEM SELECTOR PLAN 1
concern for treatment reaction vs progression of disease,   fever present since resolved  less likely but infectious w/u pending, monitor off abx  onc eval appreciated  pain control- palliative eval, d/w pt he agrees

## 2023-10-25 NOTE — PROGRESS NOTE ADULT - SUBJECTIVE AND OBJECTIVE BOX
Patient is a 78y old  Male who presents with a chief complaint of Chest pain (24 Oct 2023 20:22)      MEDICATIONS  (STANDING):  allopurinol 200 milliGRAM(s) Oral daily  aspirin enteric coated 81 milliGRAM(s) Oral daily  atorvastatin 80 milliGRAM(s) Oral at bedtime  clopidogrel Tablet 75 milliGRAM(s) Oral daily  dextrose 5%. 1000 milliLiter(s) (100 mL/Hr) IV Continuous <Continuous>  dextrose 5%. 1000 milliLiter(s) (50 mL/Hr) IV Continuous <Continuous>  dextrose 50% Injectable 25 Gram(s) IV Push once  dextrose 50% Injectable 12.5 Gram(s) IV Push once  dextrose 50% Injectable 25 Gram(s) IV Push once  enoxaparin Injectable 40 milliGRAM(s) SubCutaneous every 24 hours  glucagon  Injectable 1 milliGRAM(s) IntraMuscular once  insulin lispro (ADMELOG) corrective regimen sliding scale   SubCutaneous at bedtime  insulin lispro (ADMELOG) corrective regimen sliding scale   SubCutaneous three times a day before meals  metoprolol succinate ER 25 milliGRAM(s) Oral daily  pantoprazole    Tablet 40 milliGRAM(s) Oral before breakfast  polyethylene glycol 3350 17 Gram(s) Oral daily  senna 2 Tablet(s) Oral at bedtime    MEDICATIONS  (PRN):  acetaminophen     Tablet .. 975 milliGRAM(s) Oral every 6 hours PRN Temp greater or equal to 38C (100.4F), Moderate Pain (4 - 6)  dextrose Oral Gel 15 Gram(s) Oral once PRN Blood Glucose LESS THAN 70 milliGRAM(s)/deciliter  oxyCODONE    IR 5 milliGRAM(s) Oral every 6 hours PRN Severe Pain (7 - 10)      Vital Signs Last 24 Hrs  T(C): 36.7 (25 Oct 2023 05:25), Max: 37.2 (24 Oct 2023 17:34)  T(F): 98 (25 Oct 2023 05:25), Max: 99 (25 Oct 2023 00:23)  HR: 98 (25 Oct 2023 05:25) (82 - 98)  BP: 144/65 (25 Oct 2023 05:25) (100/63 - 144/65)  BP(mean): --  RR: 18 (25 Oct 2023 05:25) (18 - 18)  SpO2: 95% (25 Oct 2023 05:25) (95% - 97%)    Parameters below as of 25 Oct 2023 05:25  Patient On (Oxygen Delivery Method): room air    PE  NAD  Awake, alert  Anicteric, MMM  RRR  CTAB  Abd soft, NT, ND  No c/c/e  No rash grossly  FROM                          11.5   12.59 )-----------( 230      ( 25 Oct 2023 06:57 )             35.7       10-25    136  |  102  |  28<H>  ----------------------------<  140<H>  4.3   |  19<L>  |  1.21    Ca    9.0      25 Oct 2023 06:57  Phos  2.7     10-25  Mg     1.8     10-25    TPro  6.4  /  Alb  2.7<L>  /  TBili  0.6  /  DBili  x   /  AST  63<H>  /  ALT  48<H>  /  AlkPhos  119  10-25

## 2023-10-25 NOTE — PROGRESS NOTE ADULT - PROBLEM SELECTOR PLAN 2
Hx of chronic stable angina Chest pain atypical but patient with know cardiac risk factors and has prior history of CAD with stent placement. Current pain is different from prior chest pains.   elevated troponin  cards appreciated - doubt cardiac chest pain, think more likely from liver mets or treatment reaction as above  TTE reviewed no WMA

## 2023-10-25 NOTE — PROGRESS NOTE ADULT - ASSESSMENT
The patient is a 78y Male complaining of chest pain.    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   --cardiology consulted, plan for TTE on 10/25    leukocytosis   --2/2 steroids vs infection, pt given dexamethasone 20mg IV prior to treatment w/ Lutathera on 10/20  --on IV abx  --would monitor off abx   --cultures pending    anemia  --2/2 malignancy and treatment  --will monitor CBC    will follow and coordinate care w/ KARI Rosales NP  Hematology/ Oncology  [facility]  [phone] (office)  [phone] (alt office)  Evenings and weekends please call MD on call or office

## 2023-10-25 NOTE — CHART NOTE - NSCHARTNOTEFT_GEN_A_CORE
MEDICINE NP    NANDO HERNANDEZ  78y Male    Patient is a 78y old  Male who presents with a chief complaint of Chest pain (25 Oct 2023 15:38)         > Event Summary:   Notified by RN, Patient with         -Vital Signs Last 24 Hrs  T(C): 37.3 (25 Oct 2023 22:47), Max: 37.7 (25 Oct 2023 15:55)  T(F): 99.2 (25 Oct 2023 22:47), Max: 99.8 (25 Oct 2023 15:55)  HR: 107 (25 Oct 2023 22:47) (82 - 107)  BP: 108/55 (25 Oct 2023 22:47) (108/55 - 144/65)  BP(mean): --  RR: 18 (25 Oct 2023 22:47) (18 - 18)  SpO2: 95% (25 Oct 2023 22:47) (95% - 96%)    Parameters below as of 25 Oct 2023 22:47  Patient On (Oxygen Delivery Method): room air        > Assess & Plan:               ANKIT Mukherjee-BC  Medicine Department MEDICINE NP    NANDO HERNANDEZ  78y Male    78 M with hx of CAD s/p NURIA to distal LAD in 6/2023, HTN, HLD, EtOH disorder, T2DM, spinal surgeries, neuroendocrine tumor on Lutathera (first treatment on Friday 10/20 at [facility] [place]), presenting with fatigue and chest pain, fever + SIRS criteria. Concern for reaction to Lutathera vs progression of disease given start of symptoms  (25 Oct 2023 15:38)         > Event Summary:   Notified by RN, Patient with PSVT 187 x 17secs on Tele, asymptomatic.      Patient seen at bedside, AOX4, reports intermittent  palpitations and recently tonight prior.   Patient also endorses intermittent left sub sternal chest pain that is chronic,  pain is localized and patient able to point to location.  Denies chest pain at present.    -ECG obtained ST  110 bpm w/ PAC  -Telemetry strip reviewed ?PAT vs PSVT   -C/w Toprol xl 25mg   -Trend BMP / Lytes  -Cardiology following,  CP possibly 2/2 lutathera with known S/E of abdominal discomfort    -Vital Signs Last 24 Hrs  T(C): 37.3 (25 Oct 2023 22:47), Max: 37.7 (25 Oct 2023 15:55)  T(F): 99.2 (25 Oct 2023 22:47), Max: 99.8 (25 Oct 2023 15:55)  HR: 107 (25 Oct 2023 22:47) (82 - 107)  BP: 108/55 (25 Oct 2023 22:47) (108/55 - 144/65)  RR: 18 (25 Oct 2023 22:47) (18 - 18)  SpO2: 95% (25 Oct 2023 22:47) (95% - 96%)    Parameters below as of 25 Oct 2023 22:47  Patient On (Oxygen Delivery Method): room air      >>>ADDENDUM:  (10-26-23 @ 06:39)  Patient noted with -120's overnight, and frequent PAC's  D/w Tele Tech, patient had recurrent PSVT 190's x 22 secs overnight  Patient seen at bedside asleep and easily awakened, denies chest pain, sob, palpitations, dizziness.  Patient noted feverish to touch - Rectal Temp obtained T-102.5  -Tylenol IV and Cooling measures  -BCX x2 ordered  -        -Vital Signs Last 24 Hrs  T(C): 39.2 (10-26-23 @ 06:39), Max: 39.2 (10-26-23 @ 06:39)  T(F): 102.5 (10-26-23 @ 06:39), Max: 102.5 (10-26-23 @ 06:39)  HR: 105 (10-26-23 @ 05:31) (84 - 107)  BP: 117/70 (10-26-23 @ 05:31) (108/55 - 117/70)  RR: 18 (10-26-23 @ 05:31) (18 - 18)  SpO2: 95% (10-26-23 @ 05:31) (95% - 96%)              ANKIT Mukherjee-BC  Medicine Department MEDICINE NP    NANDO HERNANDEZ  78y Male    78 M with hx of CAD s/p NURIA to distal LAD in 6/2023, HTN, HLD, EtOH disorder, T2DM, spinal surgeries, neuroendocrine tumor on Lutathera (first treatment on Friday 10/20 at [facility] [place]), presenting with fatigue and chest pain, fever + SIRS criteria. Concern for reaction to Lutathera vs progression of disease given start of symptoms  (25 Oct 2023 15:38)         > Event Summary:   Notified by RN, Patient with PSVT 187 x 17secs on Tele, asymptomatic.      Patient seen at bedside, AOX4, reports intermittent  palpitations and recently tonight prior.   Patient also endorses intermittent left sub sternal chest pain that is chronic,  pain is localized and patient able to point to location.  Denies chest pain at present.    -ECG obtained ST  110 bpm w/ PAC  -Telemetry strip reviewed ?PAT vs PSVT   -C/w Toprol xl 25mg   -Trend BMP / Lytes  -Cardiology following,  CP possibly 2/2 Lutathera with known S/E of abdominal discomfort.   F/u in AM.   -Will endorse to Day Provider in AM and Attending to follow      -Vital Signs Last 24 Hrs  T(C): 37.3 (25 Oct 2023 22:47), Max: 37.7 (25 Oct 2023 15:55)  T(F): 99.2 (25 Oct 2023 22:47), Max: 99.8 (25 Oct 2023 15:55)  HR: 107 (25 Oct 2023 22:47) (82 - 107)  BP: 108/55 (25 Oct 2023 22:47) (108/55 - 144/65)  RR: 18 (25 Oct 2023 22:47) (18 - 18)  SpO2: 95% (25 Oct 2023 22:47) (95% - 96%)    Parameters below as of 25 Oct 2023 22:47  Patient On (Oxygen Delivery Method): room air      >>>ADDENDUM:  (10-26-23 @ 06:39)  Patient noted with -128's overnight, and frequent PAC's  D/w Tele Tech, patient had recurrent PSVT 190's x 22 secs overnight  Patient seen at bedside asleep and easily awakened, denies chest pain, sob, palpitations, dizziness at present.  Exam:  AOX3, +S1,S2,  +Tachycardia 118, Lungs CTA b/l,  Abd w/ +BS soft and non-tender.  Skin w/o rash.   Patient noted feverish to touch - Rectal Temp obtained T-102.5  -Tylenol IV and Cooling measures  -BCX x2 ordered  -Culture - Blood x2 (10.23.23 @ 17:45) -NGTD x 48hrs   -UA, CXR  -Magnesium 1g IVP   -Trend WBC and fever curve  -Trend BMP  /Lytes   -F/u Heme-Onc in AM.  Holding off ABX for concern of reaction to Lutathera vs progression of disease given start of symptoms  -Can consider ID c/s in AM   -D/w  HICS, Dr. Chu, reviewed above, and also recommends RVP panel, CXR, and continue to hold ABX until f/u with day team.  -Endorsed to day provider      -Vital Signs Last 24 Hrs  T(C): 39.2 (10-26-23 @ 06:39), Max: 39.2 (10-26-23 @ 06:39)  T(F): 102.5 (10-26-23 @ 06:39), Max: 102.5 (10-26-23 @ 06:39)  HR: 105 (10-26-23 @ 05:31) (84 - 107)  BP: 117/70 (10-26-23 @ 05:31) (108/55 - 117/70)  RR: 18 (10-26-23 @ 05:31) (18 - 18)  SpO2: 95% (10-26-23 @ 05:31) (95% - 96%)        ANKIT Mukherjee-BC  Medicine Department  #[id number]

## 2023-10-25 NOTE — PROGRESS NOTE ADULT - ASSESSMENT
78 M with hx of CAD s/p NURIA to distal LAD in 6/2023, HTN, HLD, EtOH disorder, T2DM, spinal surgeries, neuroendocrine tumor on Lutathera (first treatment on Friday 10/20 at [facility]), presenting with fatigue and chest pain, fever + SIRS criteria. Concern for reaction to Lutathera vs progression of disease given start of symptoms

## 2023-10-26 ENCOUNTER — APPOINTMENT (OUTPATIENT)
Dept: CARDIOLOGY | Facility: CLINIC | Age: 78
End: 2023-10-26

## 2023-10-26 DIAGNOSIS — G89.3 NEOPLASM RELATED PAIN (ACUTE) (CHRONIC): ICD-10-CM

## 2023-10-26 DIAGNOSIS — Z51.5 ENCOUNTER FOR PALLIATIVE CARE: ICD-10-CM

## 2023-10-26 LAB
ALBUMIN SERPL ELPH-MCNC: 2.8 G/DL — LOW (ref 3.3–5)
ALBUMIN SERPL ELPH-MCNC: 2.8 G/DL — LOW (ref 3.3–5)
ALP SERPL-CCNC: 134 U/L — HIGH (ref 40–120)
ALP SERPL-CCNC: 134 U/L — HIGH (ref 40–120)
ALT FLD-CCNC: 49 U/L — HIGH (ref 10–45)
ALT FLD-CCNC: 49 U/L — HIGH (ref 10–45)
ANION GAP SERPL CALC-SCNC: 15 MMOL/L — SIGNIFICANT CHANGE UP (ref 5–17)
ANION GAP SERPL CALC-SCNC: 15 MMOL/L — SIGNIFICANT CHANGE UP (ref 5–17)
APPEARANCE UR: ABNORMAL
APPEARANCE UR: ABNORMAL
AST SERPL-CCNC: 72 U/L — HIGH (ref 10–40)
AST SERPL-CCNC: 72 U/L — HIGH (ref 10–40)
BILIRUB SERPL-MCNC: 0.7 MG/DL — SIGNIFICANT CHANGE UP (ref 0.2–1.2)
BILIRUB SERPL-MCNC: 0.7 MG/DL — SIGNIFICANT CHANGE UP (ref 0.2–1.2)
BILIRUB UR-MCNC: NEGATIVE — SIGNIFICANT CHANGE UP
BILIRUB UR-MCNC: NEGATIVE — SIGNIFICANT CHANGE UP
BUN SERPL-MCNC: 33 MG/DL — HIGH (ref 7–23)
BUN SERPL-MCNC: 33 MG/DL — HIGH (ref 7–23)
CALCIUM SERPL-MCNC: 9.3 MG/DL — SIGNIFICANT CHANGE UP (ref 8.4–10.5)
CALCIUM SERPL-MCNC: 9.3 MG/DL — SIGNIFICANT CHANGE UP (ref 8.4–10.5)
CHLORIDE SERPL-SCNC: 102 MMOL/L — SIGNIFICANT CHANGE UP (ref 96–108)
CHLORIDE SERPL-SCNC: 102 MMOL/L — SIGNIFICANT CHANGE UP (ref 96–108)
CK MB BLD-MCNC: 0.5 % — SIGNIFICANT CHANGE UP (ref 0–3.5)
CK MB BLD-MCNC: 0.5 % — SIGNIFICANT CHANGE UP (ref 0–3.5)
CK MB CFR SERPL CALC: 1.7 NG/ML — SIGNIFICANT CHANGE UP (ref 0–6.7)
CK MB CFR SERPL CALC: 1.7 NG/ML — SIGNIFICANT CHANGE UP (ref 0–6.7)
CK SERPL-CCNC: 345 U/L — HIGH (ref 30–200)
CK SERPL-CCNC: 345 U/L — HIGH (ref 30–200)
CO2 SERPL-SCNC: 17 MMOL/L — LOW (ref 22–31)
CO2 SERPL-SCNC: 17 MMOL/L — LOW (ref 22–31)
COLOR SPEC: YELLOW — SIGNIFICANT CHANGE UP
COLOR SPEC: YELLOW — SIGNIFICANT CHANGE UP
CREAT SERPL-MCNC: 1.45 MG/DL — HIGH (ref 0.5–1.3)
CREAT SERPL-MCNC: 1.45 MG/DL — HIGH (ref 0.5–1.3)
DIFF PNL FLD: NEGATIVE — SIGNIFICANT CHANGE UP
DIFF PNL FLD: NEGATIVE — SIGNIFICANT CHANGE UP
EGFR: 49 ML/MIN/1.73M2 — LOW
EGFR: 49 ML/MIN/1.73M2 — LOW
GLUCOSE BLDC GLUCOMTR-MCNC: 116 MG/DL — HIGH (ref 70–99)
GLUCOSE BLDC GLUCOMTR-MCNC: 116 MG/DL — HIGH (ref 70–99)
GLUCOSE BLDC GLUCOMTR-MCNC: 140 MG/DL — HIGH (ref 70–99)
GLUCOSE BLDC GLUCOMTR-MCNC: 149 MG/DL — HIGH (ref 70–99)
GLUCOSE BLDC GLUCOMTR-MCNC: 149 MG/DL — HIGH (ref 70–99)
GLUCOSE SERPL-MCNC: 152 MG/DL — HIGH (ref 70–99)
GLUCOSE SERPL-MCNC: 152 MG/DL — HIGH (ref 70–99)
GLUCOSE UR QL: NEGATIVE — SIGNIFICANT CHANGE UP
GLUCOSE UR QL: NEGATIVE — SIGNIFICANT CHANGE UP
HCT VFR BLD CALC: 33.6 % — LOW (ref 39–50)
HCT VFR BLD CALC: 33.6 % — LOW (ref 39–50)
HGB BLD-MCNC: 11.1 G/DL — LOW (ref 13–17)
HGB BLD-MCNC: 11.1 G/DL — LOW (ref 13–17)
KETONES UR-MCNC: NEGATIVE — SIGNIFICANT CHANGE UP
KETONES UR-MCNC: NEGATIVE — SIGNIFICANT CHANGE UP
LEUKOCYTE ESTERASE UR-ACNC: NEGATIVE — SIGNIFICANT CHANGE UP
LEUKOCYTE ESTERASE UR-ACNC: NEGATIVE — SIGNIFICANT CHANGE UP
MAGNESIUM SERPL-MCNC: 1.9 MG/DL — SIGNIFICANT CHANGE UP (ref 1.6–2.6)
MAGNESIUM SERPL-MCNC: 1.9 MG/DL — SIGNIFICANT CHANGE UP (ref 1.6–2.6)
MCHC RBC-ENTMCNC: 30.1 PG — SIGNIFICANT CHANGE UP (ref 27–34)
MCHC RBC-ENTMCNC: 30.1 PG — SIGNIFICANT CHANGE UP (ref 27–34)
MCHC RBC-ENTMCNC: 33 GM/DL — SIGNIFICANT CHANGE UP (ref 32–36)
MCHC RBC-ENTMCNC: 33 GM/DL — SIGNIFICANT CHANGE UP (ref 32–36)
MCV RBC AUTO: 91.1 FL — SIGNIFICANT CHANGE UP (ref 80–100)
MCV RBC AUTO: 91.1 FL — SIGNIFICANT CHANGE UP (ref 80–100)
NITRITE UR-MCNC: NEGATIVE — SIGNIFICANT CHANGE UP
NITRITE UR-MCNC: NEGATIVE — SIGNIFICANT CHANGE UP
NRBC # BLD: 0 /100 WBCS — SIGNIFICANT CHANGE UP (ref 0–0)
NRBC # BLD: 0 /100 WBCS — SIGNIFICANT CHANGE UP (ref 0–0)
NT-PROBNP SERPL-SCNC: 4171 PG/ML — HIGH (ref 0–300)
NT-PROBNP SERPL-SCNC: 4171 PG/ML — HIGH (ref 0–300)
PH UR: 5.5 — SIGNIFICANT CHANGE UP (ref 5–8)
PH UR: 5.5 — SIGNIFICANT CHANGE UP (ref 5–8)
PHOSPHATE SERPL-MCNC: 3.7 MG/DL — SIGNIFICANT CHANGE UP (ref 2.5–4.5)
PHOSPHATE SERPL-MCNC: 3.7 MG/DL — SIGNIFICANT CHANGE UP (ref 2.5–4.5)
PLATELET # BLD AUTO: 250 K/UL — SIGNIFICANT CHANGE UP (ref 150–400)
PLATELET # BLD AUTO: 250 K/UL — SIGNIFICANT CHANGE UP (ref 150–400)
POTASSIUM SERPL-MCNC: 4.3 MMOL/L — SIGNIFICANT CHANGE UP (ref 3.5–5.3)
POTASSIUM SERPL-MCNC: 4.3 MMOL/L — SIGNIFICANT CHANGE UP (ref 3.5–5.3)
POTASSIUM SERPL-SCNC: 4.3 MMOL/L — SIGNIFICANT CHANGE UP (ref 3.5–5.3)
POTASSIUM SERPL-SCNC: 4.3 MMOL/L — SIGNIFICANT CHANGE UP (ref 3.5–5.3)
PROT SERPL-MCNC: 6.5 G/DL — SIGNIFICANT CHANGE UP (ref 6–8.3)
PROT SERPL-MCNC: 6.5 G/DL — SIGNIFICANT CHANGE UP (ref 6–8.3)
PROT UR-MCNC: ABNORMAL
PROT UR-MCNC: ABNORMAL
RAPID RVP RESULT: SIGNIFICANT CHANGE UP
RAPID RVP RESULT: SIGNIFICANT CHANGE UP
RBC # BLD: 3.69 M/UL — LOW (ref 4.2–5.8)
RBC # BLD: 3.69 M/UL — LOW (ref 4.2–5.8)
RBC # FLD: 14.5 % — SIGNIFICANT CHANGE UP (ref 10.3–14.5)
RBC # FLD: 14.5 % — SIGNIFICANT CHANGE UP (ref 10.3–14.5)
SARS-COV-2 RNA SPEC QL NAA+PROBE: SIGNIFICANT CHANGE UP
SARS-COV-2 RNA SPEC QL NAA+PROBE: SIGNIFICANT CHANGE UP
SODIUM SERPL-SCNC: 134 MMOL/L — LOW (ref 135–145)
SODIUM SERPL-SCNC: 134 MMOL/L — LOW (ref 135–145)
SP GR SPEC: 1.02 — SIGNIFICANT CHANGE UP (ref 1.01–1.02)
SP GR SPEC: 1.02 — SIGNIFICANT CHANGE UP (ref 1.01–1.02)
TROPONIN T, HIGH SENSITIVITY RESULT: 122 NG/L — HIGH (ref 0–51)
TROPONIN T, HIGH SENSITIVITY RESULT: 122 NG/L — HIGH (ref 0–51)
TROPONIN T, HIGH SENSITIVITY RESULT: 133 NG/L — HIGH (ref 0–51)
TROPONIN T, HIGH SENSITIVITY RESULT: 133 NG/L — HIGH (ref 0–51)
UROBILINOGEN FLD QL: NEGATIVE — SIGNIFICANT CHANGE UP
UROBILINOGEN FLD QL: NEGATIVE — SIGNIFICANT CHANGE UP
WBC # BLD: 12.58 K/UL — HIGH (ref 3.8–10.5)
WBC # BLD: 12.58 K/UL — HIGH (ref 3.8–10.5)
WBC # FLD AUTO: 12.58 K/UL — HIGH (ref 3.8–10.5)
WBC # FLD AUTO: 12.58 K/UL — HIGH (ref 3.8–10.5)

## 2023-10-26 PROCEDURE — 99233 SBSQ HOSP IP/OBS HIGH 50: CPT

## 2023-10-26 PROCEDURE — 99222 1ST HOSP IP/OBS MODERATE 55: CPT | Mod: GC

## 2023-10-26 PROCEDURE — 71045 X-RAY EXAM CHEST 1 VIEW: CPT | Mod: 26

## 2023-10-26 PROCEDURE — 93970 EXTREMITY STUDY: CPT | Mod: 26

## 2023-10-26 RX ORDER — CEFEPIME 1 G/1
1000 INJECTION, POWDER, FOR SOLUTION INTRAMUSCULAR; INTRAVENOUS ONCE
Refills: 0 | Status: COMPLETED | OUTPATIENT
Start: 2023-10-26 | End: 2023-10-26

## 2023-10-26 RX ORDER — CEFEPIME 1 G/1
INJECTION, POWDER, FOR SOLUTION INTRAMUSCULAR; INTRAVENOUS
Refills: 0 | Status: DISCONTINUED | OUTPATIENT
Start: 2023-10-26 | End: 2023-10-26

## 2023-10-26 RX ORDER — SODIUM CHLORIDE 9 MG/ML
1000 INJECTION, SOLUTION INTRAVENOUS
Refills: 0 | Status: DISCONTINUED | OUTPATIENT
Start: 2023-10-26 | End: 2023-10-30

## 2023-10-26 RX ORDER — CEFEPIME 1 G/1
1000 INJECTION, POWDER, FOR SOLUTION INTRAMUSCULAR; INTRAVENOUS EVERY 12 HOURS
Refills: 0 | Status: DISCONTINUED | OUTPATIENT
Start: 2023-10-26 | End: 2023-10-26

## 2023-10-26 RX ORDER — ACETAMINOPHEN 500 MG
1000 TABLET ORAL ONCE
Refills: 0 | Status: COMPLETED | OUTPATIENT
Start: 2023-10-26 | End: 2023-10-26

## 2023-10-26 RX ORDER — MAGNESIUM SULFATE 500 MG/ML
1 VIAL (ML) INJECTION ONCE
Refills: 0 | Status: COMPLETED | OUTPATIENT
Start: 2023-10-26 | End: 2023-10-26

## 2023-10-26 RX ADMIN — ATORVASTATIN CALCIUM 80 MILLIGRAM(S): 80 TABLET, FILM COATED ORAL at 21:32

## 2023-10-26 RX ADMIN — SENNA PLUS 2 TABLET(S): 8.6 TABLET ORAL at 21:32

## 2023-10-26 RX ADMIN — Medication 400 MILLIGRAM(S): at 06:59

## 2023-10-26 RX ADMIN — CEFEPIME 100 MILLIGRAM(S): 1 INJECTION, POWDER, FOR SOLUTION INTRAMUSCULAR; INTRAVENOUS at 12:38

## 2023-10-26 RX ADMIN — Medication 81 MILLIGRAM(S): at 12:41

## 2023-10-26 RX ADMIN — CHLORHEXIDINE GLUCONATE 1 APPLICATION(S): 213 SOLUTION TOPICAL at 12:43

## 2023-10-26 RX ADMIN — PANTOPRAZOLE SODIUM 40 MILLIGRAM(S): 20 TABLET, DELAYED RELEASE ORAL at 06:33

## 2023-10-26 RX ADMIN — Medication 25 MILLIGRAM(S): at 06:33

## 2023-10-26 RX ADMIN — Medication 200 MILLIGRAM(S): at 12:41

## 2023-10-26 RX ADMIN — CLOPIDOGREL BISULFATE 75 MILLIGRAM(S): 75 TABLET, FILM COATED ORAL at 12:41

## 2023-10-26 RX ADMIN — Medication 100 GRAM(S): at 07:42

## 2023-10-26 RX ADMIN — POLYETHYLENE GLYCOL 3350 17 GRAM(S): 17 POWDER, FOR SOLUTION ORAL at 12:42

## 2023-10-26 RX ADMIN — ENOXAPARIN SODIUM 40 MILLIGRAM(S): 100 INJECTION SUBCUTANEOUS at 06:00

## 2023-10-26 RX ADMIN — Medication 1000 MILLIGRAM(S): at 08:59

## 2023-10-26 NOTE — PROGRESS NOTE ADULT - PROBLEM SELECTOR PLAN 2
Hx of chronic stable angina Chest pain atypical but patient with know cardiac risk factors and has prior history of CAD with stent placement. Current pain is different from prior chest pains.   elevated troponin  cards appreciated - doubt cardiac chest pain, think more likely from liver mets or treatment reaction as above  TTE reviewed no WMA Hx of chronic stable angina Chest pain atypical but patient with know cardiac risk factors and has prior history of CAD with stent placement. Current pain is different from prior chest pains.   elevated troponin which continues to increase  cards appreciated - doubt cardiac chest pain, think more likely from liver mets or treatment reaction as above  TTE reviewed no WMA

## 2023-10-26 NOTE — PROGRESS NOTE ADULT - ASSESSMENT
The patient is a 78y Male complaining of chest pain.    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   --Cardiology following, deferring diuresis for now  --TTE w/ Left ventricular systolic function is normal with an ejection fraction of 64%  --F/u palliative recommendations    leukocytosis, fevers  --2/2 steroids vs infection, pt given dexamethasone 20mg IV prior to treatment w/ Lutathera on 10/20  --ID consulted, f/u repeat blood cultures  --Started on cefepime  --F/u LE Duplex    anemia  --2/2 malignancy and treatment  --will monitor CBC    will follow and coordinate care w/ [facility]    Hugo Topete PA-C  Hematology/Oncology  [facility]  [phone] (office)

## 2023-10-26 NOTE — PROGRESS NOTE ADULT - PROBLEM SELECTOR PLAN 1
concern for treatment reaction vs progression of disease,     less likely but infectious w/u pending, monitor off abx  onc eval appreciated  pain control- palliative eval, d/w pt he agrees    still with fever - after abx were stopped - restart empiric pending ID recs  f/u cultures  check doppler r/o DVT

## 2023-10-26 NOTE — CONSULT NOTE ADULT - ATTENDING COMMENTS
Palliative consulted for pain management in this 77yo with neuroendocrine tumor. Started on tylenol and PO oxy PRN by primary team. Patient is not interested in utilizing opiates for pain management and states he is comfortable with just using tylenol. However, he understands it is available for him to use in the event he has severe pain.     Remain available, and agree with rest of note as documented above.  [phone]

## 2023-10-26 NOTE — PROGRESS NOTE ADULT - SUBJECTIVE AND OBJECTIVE BOX
[facility] Division of Hospital Medicine  Stevenson Wilson MD  Contact M-F, 8A-5P through [employer] Teams  Other times, contact Hospitalist on call    Patient is a 78y old  Male who presents with a chief complaint of Chest pain (26 Oct 2023 12:22)    SUBJECTIVE / OVERNIGHT EVENTS: another fever this am. clinically pt states he feels better than ever with improving ab pain  ADDITIONAL REVIEW OF SYSTEMS:    MEDICATIONS  (STANDING):  allopurinol 200 milliGRAM(s) Oral daily  aspirin enteric coated 81 milliGRAM(s) Oral daily  atorvastatin 80 milliGRAM(s) Oral at bedtime  cefepime   IVPB 1000 milliGRAM(s) IV Intermittent every 12 hours  cefepime   IVPB      chlorhexidine 2% Cloths 1 Application(s) Topical daily  clopidogrel Tablet 75 milliGRAM(s) Oral daily  dextrose 5%. 1000 milliLiter(s) (50 mL/Hr) IV Continuous <Continuous>  dextrose 5%. 1000 milliLiter(s) (100 mL/Hr) IV Continuous <Continuous>  dextrose 50% Injectable 25 Gram(s) IV Push once  dextrose 50% Injectable 12.5 Gram(s) IV Push once  dextrose 50% Injectable 25 Gram(s) IV Push once  enoxaparin Injectable 40 milliGRAM(s) SubCutaneous every 24 hours  glucagon  Injectable 1 milliGRAM(s) IntraMuscular once  insulin lispro (ADMELOG) corrective regimen sliding scale   SubCutaneous three times a day before meals  insulin lispro (ADMELOG) corrective regimen sliding scale   SubCutaneous at bedtime  lactated ringers. 1000 milliLiter(s) (500 mL/Hr) IV Continuous <Continuous>  metoprolol succinate ER 25 milliGRAM(s) Oral daily  pantoprazole    Tablet 40 milliGRAM(s) Oral before breakfast  polyethylene glycol 3350 17 Gram(s) Oral daily  senna 2 Tablet(s) Oral at bedtime    MEDICATIONS  (PRN):  acetaminophen     Tablet .. 975 milliGRAM(s) Oral every 6 hours PRN Temp greater or equal to 38C (100.4F), Moderate Pain (4 - 6)  dextrose Oral Gel 15 Gram(s) Oral once PRN Blood Glucose LESS THAN 70 milliGRAM(s)/deciliter  oxyCODONE    IR 5 milliGRAM(s) Oral every 6 hours PRN Severe Pain (7 - 10)      CAPILLARY BLOOD GLUCOSE      POCT Blood Glucose.: 149 mg/dL (26 Oct 2023 11:45)  POCT Blood Glucose.: 140 mg/dL (26 Oct 2023 07:46)  POCT Blood Glucose.: 142 mg/dL (25 Oct 2023 21:06)  POCT Blood Glucose.: 159 mg/dL (25 Oct 2023 16:33)    I&O's Summary    25 Oct 2023 07:01  -  26 Oct 2023 07:00  --------------------------------------------------------  IN: 200 mL / OUT: 300 mL / NET: -100 mL        PHYSICAL EXAM:  Vital Signs Last 24 Hrs  T(C): 37.8 (26 Oct 2023 08:47), Max: 39.2 (26 Oct 2023 06:39)  T(F): 100.1 (26 Oct 2023 08:47), Max: 102.5 (26 Oct 2023 06:39)  HR: 93 (26 Oct 2023 08:47) (84 - 107)  BP: 94/59 (26 Oct 2023 08:47) (94/59 - 117/70)  BP(mean): --  RR: 18 (26 Oct 2023 08:47) (18 - 18)  SpO2: 97% (26 Oct 2023 08:47) (95% - 97%)    Parameters below as of 26 Oct 2023 08:47  Patient On (Oxygen Delivery Method): room air    CONSTITUTIONAL: Well-groomed, in no apparent distress  EYES: No conjunctival or scleral injection, non-icteric; PERRLA and symmetric  ENMT: No external nasal lesions; nasal mucosa not inflamed; no pharyngeal injection or exudates, oral mucosa with moist membranes  RESPIRATORY: Breathing comfortably; lungs CTA without wheeze/rhonchi/rales  CARDIOVASCULAR: RRR +S1S2, no M/G/R; pedal pulses full and symmetric; trace lower extremity edema; lower chest pain reproducible with palpation but improved from yesterdaay  GASTROINTESTINAL: interval improvement of moderate tenderness primarily epigastric and RUQ with voluntary guarding, No palpable masses, +BS throughout, no rebound no hepatosplenomegaly; no hernia palpated  LYMPHATIC: No cervical LAD or tenderness  MUSCULOSKELETAL: No malalignment of extremities; no joint swelling or tenderness  SKIN: No rashes or ulcers noted; no subcutaneous nodules or induration palpable  NEUROLOGIC: CN grossly intact; sensation intact in LEs b/l to light touch; normal strength and tone  PSYCHIATRIC: A+O x 3; mood and affect appropriate; appropriate insight and judgment  LABS:                        11.1   12.58 )-----------( 250      ( 26 Oct 2023 07:04 )             33.6     10-26    134<L>  |  102  |  33<H>  ----------------------------<  152<H>  4.3   |  17<L>  |  1.45<H>    Ca    9.3      26 Oct 2023 06:59  Phos  3.7     10-26  Mg     1.9     10-26    TPro  6.5  /  Alb  2.8<L>  /  TBili  0.7  /  DBili  x   /  AST  72<H>  /  ALT  49<H>  /  AlkPhos  134<H>  10-26      CARDIAC MARKERS ( 26 Oct 2023 11:16 )  x     / x     / 345 U/L / x     / 1.7 ng/mL      Urinalysis Basic - ( 26 Oct 2023 06:59 )    Color: x / Appearance: x / SG: x / pH: x  Gluc: 152 mg/dL / Ketone: x  / Bili: x / Urobili: x   Blood: x / Protein: x / Nitrite: x   Leuk Esterase: x / RBC: x / WBC x   Sq Epi: x / Non Sq Epi: x / Bacteria: x        Culture - Urine (collected 23 Oct 2023 20:12)  Source: Clean Catch Clean Catch (Midstream)  Final Report (25 Oct 2023 09:38):    <10,000 CFU/mL Normal Urogenital [place]    Culture - Blood (collected 23 Oct 2023 17:45)  Source: .Blood Blood-Peripheral  Preliminary Report (25 Oct 2023 22:01):    No growth at 48 Hours    Culture - Blood (collected 23 Oct 2023 17:30)  Source: .Blood Blood-Peripheral  Preliminary Report (25 Oct 2023 22:01):    No growth at 48 Hours      SARS-CoV-2: NotDetec (26 Oct 2023 08:45)      RADIOLOGY & ADDITIONAL TESTS:  Results Reviewed:   Imaging Personally Reviewed:  Electrocardiogram Personally Reviewed:    COORDINATION OF CARE:  Care Discussed with Consultants/Other Providers [Y/N]: ID & palliative consulted  Prior or Outpatient Records Reviewed [Y/N]:

## 2023-10-26 NOTE — CONSULT NOTE ADULT - SUBJECTIVE AND OBJECTIVE BOX
HPI:  78 M with hx of CAD s/p NURIA to distal LAD in 6/2023, HTN, HLD, EtOH disorder, T2DM, spinal surgeries, neuroendocrine tumor on Lutathera (first treatment on Friday 10/20 at [facility] [place]), presenting with fatigue and chest pain since Friday. Patient reports severe fatigue starting Friday after his first treatment with Lutathera. Pt also reported mid-left lower chest pain described as pressure. No radiation, no aggravating or alleviating factors. Pt has never felt this type of chest pain before. Denies palpitations, diaphoresis, dyspnea, nausea, vomiting. Pt has diffuse abdominal pain. Also with constipation; last BM several days ago. No other acute complaints. In ED, patient developed fever with Tmax 100.7. CT imaging negative for PE but showed interval worsening of metastatic hepatic lesions. UA unremarkable. Troponin 73 --> 82, EKG showing sinus tachycardia with PACs. Pt given cefepime in ED.  (23 Oct 2023 23:34)    PERTINENT PM/SXH:   Hypertension    Hypercholesterolemia    PAD (peripheral artery disease)    Neuroendocrine carcinoma      S/P knee replacement, bilateral    S/P hip replacement    History of hernia repair    H/O laminectomy    History of lumbosacral spine surgery      FAMILY HISTORY:    Family Hx substance abuse [ ]yes [ ]no  ITEMS NOT CHECKED ARE NOT PRESENT    SOCIAL HISTORY:   Significant other/partner[ ]  Children[ ]  Orthodox/Spirituality:  Substance hx:  [ ]   Tobacco hx:  [ ]   Alcohol hx: [ ]   Home Opioid hx:  [ ] I-Stop Reference No:  Living Situation: [ ]Home  [ ]Long term care  [ ]Rehab [ ]Other    ADVANCE DIRECTIVES:    DNR/MOLST  [ ]  Living Will  [ ]   DECISION MAKER(s):  [ ] Health Care Proxy(s)  [ ] Surrogate(s)  [ ] Guardian           Name(s): Phone Number(s):    BASELINE (I)ADL(s) (prior to admission):  [place]: [ ]Total  [ ] Moderate [ ]Dependent    Allergies    No Known Allergies    Intolerances    MEDICATIONS  (STANDING):  allopurinol 200 milliGRAM(s) Oral daily  aspirin enteric coated 81 milliGRAM(s) Oral daily  atorvastatin 80 milliGRAM(s) Oral at bedtime  cefepime   IVPB 1000 milliGRAM(s) IV Intermittent every 12 hours  cefepime   IVPB 1000 milliGRAM(s) IV Intermittent once  cefepime   IVPB      chlorhexidine 2% Cloths 1 Application(s) Topical daily  clopidogrel Tablet 75 milliGRAM(s) Oral daily  dextrose 5%. 1000 milliLiter(s) (100 mL/Hr) IV Continuous <Continuous>  dextrose 5%. 1000 milliLiter(s) (50 mL/Hr) IV Continuous <Continuous>  dextrose 50% Injectable 25 Gram(s) IV Push once  dextrose 50% Injectable 12.5 Gram(s) IV Push once  dextrose 50% Injectable 25 Gram(s) IV Push once  enoxaparin Injectable 40 milliGRAM(s) SubCutaneous every 24 hours  glucagon  Injectable 1 milliGRAM(s) IntraMuscular once  insulin lispro (ADMELOG) corrective regimen sliding scale   SubCutaneous three times a day before meals  insulin lispro (ADMELOG) corrective regimen sliding scale   SubCutaneous at bedtime  lactated ringers. 1000 milliLiter(s) (500 mL/Hr) IV Continuous <Continuous>  metoprolol succinate ER 25 milliGRAM(s) Oral daily  pantoprazole    Tablet 40 milliGRAM(s) Oral before breakfast  polyethylene glycol 3350 17 Gram(s) Oral daily  senna 2 Tablet(s) Oral at bedtime    MEDICATIONS  (PRN):  acetaminophen     Tablet .. 975 milliGRAM(s) Oral every 6 hours PRN Temp greater or equal to 38C (100.4F), Moderate Pain (4 - 6)  dextrose Oral Gel 15 Gram(s) Oral once PRN Blood Glucose LESS THAN 70 milliGRAM(s)/deciliter  oxyCODONE    IR 5 milliGRAM(s) Oral every 6 hours PRN Severe Pain (7 - 10)    PRESENT SYMPTOMS: [ ]Unable to self-report  [ ] CPOT [ ] PAINADs [ ] RDOS  Source if other than patient:  [ ]Family   [ ]Team     Pain: [ ]yes [ ]no  QOL impact -   Location -                    Aggravating factors -  Quality -  Radiation -  Timing-  Severity (0-10 scale):  Minimal acceptable level (0-10 scale):     CPOT:    https://www.sccm.org/getattachment/wrp94k48-7b4z-[email or web address]-8454w1216q2y/Critical-Care-Pain-Observation-Tool-(CPOT)    PAIN AD Score:   http://geriatrictoolkit.[email or web address]/cog/painad.pdf (press ctrl +  left click to view)    Dyspnea:                           [ ]Mild [ ]Moderate [ ]Severe      RDOS:  0 to 2  minimal or no respiratory distress   3  mild distress  4 to 6 moderate distress  >7 severe distress  https://[email or web address]/handouts/hen/Respiratory_Distress_Observation_Scale.pdf (Ctrl +  left click to view)     Anxiety:                             [ ]Mild [ ]Moderate [ ]Severe  Fatigue:                             [ ]Mild [ ]Moderate [ ]Severe  Nausea:                             [ ]Mild [ ]Moderate [ ]Severe  Loss of appetite:              [ ]Mild [ ]Moderate [ ]Severe  Constipation:                    [ ]Mild [ ]Moderate [ ]Severe    PCSSQ[Palliative Care Spiritual Screening Question]   Severity (0-10):  Score of 4 or > indicate consideration of Chaplaincy referral.  Chaplaincy Referral: [ ] yes [ ] refused [ ] following [ ] Deferred     Caregiver [place]? : [ ] yes [ ] no [ ] Deferred [ ] Declined             Social work referral [ ] Patient & Family Centered Care Referral [ ]     Anticipatory Grief present?:  [ ] yes [ ] no  [ ] Deferred                  Social work referral [ ] Chaplaincy Referral[ ]      Other Symptoms:  [ ]All other review of systems negative     Palliative Performance Status Version 2:         %    http://npcrc.org/files/news/palliative_performance_scale_ppsv2.pdf  PHYSICAL EXAM:  Vital Signs Last 24 Hrs  T(C): 37.8 (26 Oct 2023 08:47), Max: 39.2 (26 Oct 2023 06:39)  T(F): 100.1 (26 Oct 2023 08:47), Max: 102.5 (26 Oct 2023 06:39)  HR: 93 (26 Oct 2023 08:47) (84 - 107)  BP: 94/59 (26 Oct 2023 08:47) (94/59 - 117/70)  BP(mean): --  RR: 18 (26 Oct 2023 08:47) (18 - 18)  SpO2: 97% (26 Oct 2023 08:47) (95% - 97%)    Parameters below as of 26 Oct 2023 08:47  Patient On (Oxygen Delivery Method): room air     I&O's Summary    25 Oct 2023 07:01  -  26 Oct 2023 07:00  --------------------------------------------------------  IN: 200 mL / OUT: 300 mL / NET: -100 mL      GENERAL: [ ]Cachexia    [ ]Alert  [ ]Oriented x   [ ]Lethargic  [ ]Unarousable  [ ]Verbal  [ ]Non-Verbal  Behavioral:   [ ] Anxiety  [ ] Delirium [ ] Agitation [ ] Other  HEENT:  [ ]Normal   [ ]Dry mouth   [ ]ET Tube/Trach  [ ]Oral lesions  PULMONARY:   [ ]Clear [ ]Tachypnea  [ ]Audible excessive secretions   [ ]Rhonchi        [ ]Right [ ]Left [ ]Bilateral  [ ]Crackles        [ ]Right [ ]Left [ ]Bilateral  [ ]Wheezing     [ ]Right [ ]Left [ ]Bilateral  [ ]Diminished breath sounds [ ]right [ ]left [ ]bilateral  CARDIOVASCULAR:    [ ]Regular [ ]Irregular [ ]Tachy  [ ]Lavelle [ ]Murmur [ ]Other  GASTROINTESTINAL:  [ ]Soft  [ ]Distended   [ ]+BS  [ ]Non tender [ ]Tender  [ ]Other [ ]PEG [ ]OGT/ NGT  Last BM:  GENITOURINARY:  [ ]Normal [ ] Incontinent   [ ]Oliguria/Anuria   [ ]Ivory  MUSCULOSKELETAL:   [ ]Normal   [ ]Weakness  [ ]Bed/Wheelchair bound [ ]Edema  NEUROLOGIC:   [ ]No focal deficits  [ ]Cognitive impairment  [ ]Dysphagia [ ]Dysarthria [ ]Paresis [ ]Other   SKIN:   [ ]Normal  [ ]Rash  [ ]Other  [ ]Pressure ulcer(s)       Present on admission [ ]y [ ]n    CRITICAL CARE:  [ ] Shock Present  [ ]Septic [ ]Cardiogenic [ ]Neurologic [ ]Hypovolemic  [ ]  Vasopressors [ ]  Inotropes   [ ]Respiratory failure present [ ]Mechanical ventilation [ ]Non-invasive ventilatory support [ ]High flow    [ ]Acute  [ ]Chronic [ ]Hypoxic  [ ]Hypercarbic [ ]Other  [ ]Other organ failure     LABS:                        11.1   12.58 )-----------( 250      ( 26 Oct 2023 07:04 )             33.6   10-26    134<L>  |  102  |  33<H>  ----------------------------<  152<H>  4.3   |  17<L>  |  1.45<H>    Ca    9.3      26 Oct 2023 06:59  Phos  3.7     10-26  Mg     1.9     10-26    TPro  6.5  /  Alb  2.8<L>  /  TBili  0.7  /  DBili  x   /  AST  72<H>  /  ALT  49<H>  /  AlkPhos  134<H>  10-26      Urinalysis Basic - ( 26 Oct 2023 06:59 )    Color: x / Appearance: x / SG: x / pH: x  Gluc: 152 mg/dL / Ketone: x  / Bili: x / Urobili: x   Blood: x / Protein: x / Nitrite: x   Leuk Esterase: x / RBC: x / WBC x   Sq Epi: x / Non Sq Epi: x / Bacteria: x      RADIOLOGY & ADDITIONAL STUDIES:    PROTEIN CALORIE MALNUTRITION PRESENT: [ ]mild [ ]moderate [ ]severe [ ]underweight [ ]morbid obesity  https://www.andeal.org/vault/2440/web/files/ONC/Table_Clinical%20Characteristics%20to%20Document%20Malnutrition-White%20JV%20et%20al%202012.pdf    Height (cm): 182.9 (10-23-23 @ 16:06), 182.9 (06-23-23 @ 08:24)  Weight (kg): 120.2 (10-23-23 @ 16:06), 124.697 (09-08-23 @ 13:00), 124.693 (06-23-23 @ 08:24)  BMI (kg/m2): 35.9 (10-23-23 @ 16:06), 37.3 (09-08-23 @ 13:00), 37.3 (06-23-23 @ 08:24)    [ ]PPSV2 < or = to 30% [ ]significant weight loss  [ ]poor nutritional intake  [ ]anasarca[ ]Artificial Nutrition      Other REFERRALS:  [ ]Hospice  [ ]Child Life  [ ]Social Work  [ ]Case management [ ]Holistic Therapy     Goals of Care Document: EMMA Oliver (10-12-21 @ 13:00)  Goals of Care Conversation:   Participants:  · Participants  Patient    Advance Directives:  · Does patient have Advance Directive  No  · Do you want to complete the HCP and name a [facility] Care Agent  Yes  pending    Conversation Discussion:  · Conversation  MOLST Discussed  · Conversation Details  Pt was seen for goals of care conversation. Pt was provided the video code BHJN and MOLST form for completion.  pt was very receptive and was happy that this service was provided.    What Matters Most To Patient and Family:  · What matters most to patient and family  Remain full code, wants to return to his home and personal life.    Personal Advance Directives Treatment Guidelines:   Treatment Guidelines:  · Decision Maker  Patient  · Treatment Guideline Comments  full code    Location of Discussion:   Duration of Advanced Care Planning Meeting:  · Time spent (in minutes)  6    Location of Discussion:  · Location of discussion  Face to face      Electronic Signatures:  La Oliver (RN)  (Signed 12-Oct-2021 18:17)  	Authored: Goals of Care Conversation, Personal Advance Directives Treatment Guidelines, Location of Discussion      Last Updated: 12-Oct-2021 18:17 by La Oliver (RN)     HPI:  Patient is a 78 year old male with a past medical history of coronary artery disease s/p stent (06/2023), hypertension, hyperlipidemia, alcohol disorder, type 2 diabetes mellitus, spinal surgeries, and neuroendocrine tumor on Lutathera (first treatment on 10/20/2023 at [facility] [place] who presented with fatigue and chest pain. Patient's symptoms began following his first treatment of Lutathera. Patient's chest pain is located in the mid-left lower chest pain, described as pressure/pulling, and rated a 6/10 in severity Patient denies radiation and is unable to identify alleviating or aggravating factors at this time.  Patient has not had this type of pain in the past and has been managing his pain conservatively without the use of analgesic medications. Patient also endorses bilateral lower extremity pain from his knees to his feet associated with his lower extremity edema. Patient reports his is bilateral lower extremity pain began a year ago, is described as achy, and currently a 6/10 in severity. Patient denies radiation and is unable to identify any aggravating factors at this time. He endorses that his compression stockings and compression boots which he uses daily at home help alleviate his lower extremity pain. He also manages this pain conservatively and does not take analgesic medications. This admission patient is ordered for acetaminophen 975 mg q6 hours PRN and oxycodone IR 5 mg q6 hours PRN for pain however he has not requested any analgesic medications to date. Goals of care also discussed with patient and he expects to be discharged home soon so he can resume care for his wife.        Of note, CT imaging negative for PE but showed interval worsening of metastatic hepatic lesions. TTE (10/25/2023) unrevealing for a cardiac source of pain.     PERTINENT PM/SXH:   Hypertension    Hypercholesterolemia    PAD (peripheral artery disease)    Neuroendocrine carcinoma      S/P knee replacement, bilateral    S/P hip replacement    History of hernia repair    H/O laminectomy    History of lumbosacral spine surgery      FAMILY HISTORY:    Family Hx substance abuse [ ]yes [ ]no  ITEMS NOT CHECKED ARE NOT PRESENT    SOCIAL HISTORY:   Significant other/partner[ ]  Children[ ]  [place]/Spirituality:  Substance hx:  [ ]   Tobacco hx:  [ ]   Alcohol hx: [x]   Home Opioid hx:  [ ] I-Stop Reference No:  Living Situation: [x]Home  [ ]Long term care  [ ]Rehab [ ]Other    ADVANCE DIRECTIVES:    DNR/MOLST  [ ]  Living Will  [ ]   DECISION MAKER(s):  [ ] Health Care Proxy(s)  [ ] Surrogate(s)  [ ] Guardian           Name(s): Phone Number(s):    BASELINE (I)ADL(s) (prior to admission):  [place]: [x]Total  [ ] Moderate [ ]Dependent    Allergies    No Known Allergies    Intolerances    MEDICATIONS  (STANDING):  allopurinol 200 milliGRAM(s) Oral daily  aspirin enteric coated 81 milliGRAM(s) Oral daily  atorvastatin 80 milliGRAM(s) Oral at bedtime  cefepime   IVPB 1000 milliGRAM(s) IV Intermittent every 12 hours  cefepime   IVPB 1000 milliGRAM(s) IV Intermittent once  cefepime   IVPB      chlorhexidine 2% Cloths 1 Application(s) Topical daily  clopidogrel Tablet 75 milliGRAM(s) Oral daily  dextrose 5%. 1000 milliLiter(s) (100 mL/Hr) IV Continuous <Continuous>  dextrose 5%. 1000 milliLiter(s) (50 mL/Hr) IV Continuous <Continuous>  dextrose 50% Injectable 25 Gram(s) IV Push once  dextrose 50% Injectable 12.5 Gram(s) IV Push once  dextrose 50% Injectable 25 Gram(s) IV Push once  enoxaparin Injectable 40 milliGRAM(s) SubCutaneous every 24 hours  glucagon  Injectable 1 milliGRAM(s) IntraMuscular once  insulin lispro (ADMELOG) corrective regimen sliding scale   SubCutaneous three times a day before meals  insulin lispro (ADMELOG) corrective regimen sliding scale   SubCutaneous at bedtime  lactated ringers. 1000 milliLiter(s) (500 mL/Hr) IV Continuous <Continuous>  metoprolol succinate ER 25 milliGRAM(s) Oral daily  pantoprazole    Tablet 40 milliGRAM(s) Oral before breakfast  polyethylene glycol 3350 17 Gram(s) Oral daily  senna 2 Tablet(s) Oral at bedtime    MEDICATIONS  (PRN):  acetaminophen     Tablet .. 975 milliGRAM(s) Oral every 6 hours PRN Temp greater or equal to 38C (100.4F), Moderate Pain (4 - 6)  dextrose Oral Gel 15 Gram(s) Oral once PRN Blood Glucose LESS THAN 70 milliGRAM(s)/deciliter  oxyCODONE    IR 5 milliGRAM(s) Oral every 6 hours PRN Severe Pain (7 - 10)    PRESENT SYMPTOMS: [ ]Unable to self-report  [ ] CPOT [ ] PAINADs [ ] RDOS  Source if other than patient:  [ ]Family   [ ]Team     Pain: [x]yes [ ]no  QOL impact - patient is able to complete his ADLs and care for his wife despite the pain   Location - Mid left lower chest                   Aggravating factors - none  Quality - Pressure/pulling  Radiation - None  Timing - Constant  Severity (0-10 scale): 6/10  Minimal acceptable level (0-10 scale): 2/10     QOL impact - patient is able to complete his ADLs and care for his wife despite the pain   Location - Bilateral Left Lower extremities from knees to feet                    Aggravating factors - none  Quality - Achy  Radiation - None  Timing - Constant  Severity (0-10 scale): 6/10  Minimal acceptable level (0-10 scale): 2/10     CPOT:    https://www.scc.org/getattachment/[email or web address]-5892y0241w2r/Critical-Care-Pain-Observation-Tool-(CPOT)    PAIN AD Score:   http://geriatrictoolkit.[email or web address]/cog/painad.pdf (press ctrl +  left click to view)    Dyspnea:                           [ ]Mild [ ]Moderate [ ]Severe      RDOS: 0  0 to 2  minimal or no respiratory distress   3  mild distress  4 to 6 moderate distress  >7 severe distress  https://[email or web address]/handouts/hen/Respiratory_Distress_Observation_Scale.pdf (Ctrl +  left click to view)     Anxiety:                             [ ]Mild [ ]Moderate [ ]Severe  Fatigue:                             [ ]Mild [ ]Moderate [ ]Severe  Nausea:                             [ ]Mild [ ]Moderate [ ]Severe  Loss of appetite:              [ ]Mild [ ]Moderate [ ]Severe  Constipation:                    [ ]Mild [ ]Moderate [ ]Severe    PCSSQ[Palliative Care Spiritual Screening Question]   Severity (0-10):  Score of 4 or > indicate consideration of Chaplaincy referral.  Chaplaincy Referral: [ ] yes [x] refused [ ] following [ ] Deferred     Caregiver [place]? : [ ] yes [ ] no [ ] Deferred [ ] Declined             Social work referral [ ] Patient & Family Centered Care Referral [ ]     Anticipatory Grief present?:  [ ] yes [x] no  [ ] Deferred                  Social work referral [ ] Chaplaincy Referral[ ]      Other Symptoms:  [x]All other review of systems negative     Palliative Performance Status Version 2:         %    http://npcrc.org/files/news/palliative_performance_scale_ppsv2.pdf  PHYSICAL EXAM:    Vital Signs Last 24 Hrs  T(C): 37.8 (26 Oct 2023 08:47), Max: 39.2 (26 Oct 2023 06:39)  T(F): 100.1 (26 Oct 2023 08:47), Max: 102.5 (26 Oct 2023 06:39)  HR: 93 (26 Oct 2023 08:47) (84 - 107)  BP: 94/59 (26 Oct 2023 08:47) (94/59 - 117/70)  BP(mean): --  RR: 18 (26 Oct 2023 08:47) (18 - 18)  SpO2: 97% (26 Oct 2023 08:47) (95% - 97%)    Parameters below as of 26 Oct 2023 08:47  Patient On (Oxygen Delivery Method): room air     I&O's Summary    25 Oct 2023 07:01  -  26 Oct 2023 07:00  --------------------------------------------------------  IN: 200 mL / OUT: 300 mL / NET: -100 mL      GENERAL: [ ]Cachexia    [x]Alert  [x]Oriented x   [ ]Lethargic  [ ]Unarousable  [x]Verbal  [ ]Non-Verbal  Behavioral:   [ ] Anxiety  [ ] Delirium [ ] Agitation [ ] Other  HEENT:  [x]Normal   [ ]Dry mouth   [ ]ET Tube/Trach  [ ]Oral lesions  PULMONARY:   [ ]Clear [ ]Tachypnea  [ ]Audible excessive secretions   [ ]Rhonchi        [ ]Right [ ]Left [ ]Bilateral  [ ]Crackles        [ ]Right [ ]Left [ ]Bilateral  [ ]Wheezing     [ ]Right [ ]Left [ ]Bilateral  [ ]Diminished breath sounds [ ]right [ ]left [ ]bilateral  CARDIOVASCULAR:    [ ]Regular [ ]Irregular [ ]Tachy  [ ]Lavelle [ ]Murmur [ ]Other  GASTROINTESTINAL:  [ ]Soft  [ ]Distended   [ ]+BS  [ ]Non tender [ ]Tender  [ ]Other [ ]PEG [ ]OGT/ NGT  Last BM:  GENITOURINARY:  [ ]Normal [ ] Incontinent   [ ]Oliguria/Anuria   [ ]Ivory  MUSCULOSKELETAL:   [ ]Normal   [ ]Weakness  [ ]Bed/Wheelchair bound [x]Edema  NEUROLOGIC:   [x]No focal deficits  [ ]Cognitive impairment  [ ]Dysphagia [ ]Dysarthria [ ]Paresis [ ]Other   SKIN:   [x]Normal  [ ]Rash  [ ]Other  [ ]Pressure ulcer(s)       Present on admission [ ]y [ ]n    CRITICAL CARE:  [ ] Shock Present  [ ]Septic [ ]Cardiogenic [ ]Neurologic [ ]Hypovolemic  [ ]  Vasopressors [ ]  Inotropes   [ ]Respiratory failure present [ ]Mechanical ventilation [ ]Non-invasive ventilatory support [ ]High flow    [ ]Acute  [ ]Chronic [ ]Hypoxic  [ ]Hypercarbic [ ]Other  [ ]Other organ failure     LABS:                        11.1   12.58 )-----------( 250      ( 26 Oct 2023 07:04 )             33.6   10-26    134<L>  |  102  |  33<H>  ----------------------------<  152<H>  4.3   |  17<L>  |  1.45<H>    Ca    9.3      26 Oct 2023 06:59  Phos  3.7     10-26  Mg     1.9     10-26    TPro  6.5  /  Alb  2.8<L>  /  TBili  0.7  /  DBili  x   /  AST  72<H>  /  ALT  49<H>  /  AlkPhos  134<H>  10-26      Urinalysis Basic - ( 26 Oct 2023 06:59 )    Color: x / Appearance: x / SG: x / pH: x  Gluc: 152 mg/dL / Ketone: x  / Bili: x / Urobili: x   Blood: x / Protein: x / Nitrite: x   Leuk Esterase: x / RBC: x / WBC x   Sq Epi: x / Non Sq Epi: x / Bacteria: x      RADIOLOGY & ADDITIONAL STUDIES:    CT Angio Chest/CT Abdomen-Pelvis (10/23/2023)    IMPRESSION:  No pulmonary embolism. Evaluation of the segmental/subsegmental arteries   is limited due to suboptimal arterial opacification and respiratory   motion artifact.    Multiple metastatic hepatic lesions, increased in size and number since   8/16/2023.    Chest X-ray (10/26/2023)      FINDINGS:    Clear lungs.  No pneumothorax or large pleural effusion.  Heart size cannot be accurately assessed in this projection.    IMPRESSION:    Clear lungs.    TTE (10/25/2023)     CONCLUSIONS:      1. Left ventricular cavity is normal. Left ventricular wall thickness is mildly increased. Left ventricular systolic function is normal with an ejection fraction of 64 % by Avila's method of disks. There are no regional wall motion abnormalities seen.   2. Normal left ventricular diastolic function, with normal filling pressure.   3. Normal right ventricular cavity size, wall thickness, and systolic function.   4. The left atrium is moderately dilated in size.   5. No significant valvular disease.   6. No pericardial effusion seen.   7. No prior echocardiogram is available for comparison.      PROTEIN CALORIE MALNUTRITION PRESENT: [ ]mild [ ]moderate [ ]severe [ ]underweight [ ]morbid obesity  https://www.andeal.org/vault/2440/web/files/ONC/Table_Clinical%20Characteristics%20to%20Document%20Malnutrition-White%20JV%20et%20al%461606.pdf    Height (cm): 182.9 (10-23-23 @ 16:06), 182.9 (06-23-23 @ 08:24)  Weight (kg): 120.2 (10-23-23 @ 16:06), 124.697 (09-08-23 @ 13:00), 124.693 (06-23-23 @ 08:24)  BMI (kg/m2): 35.9 (10-23-23 @ 16:06), 37.3 (09-08-23 @ 13:00), 37.3 (06-23-23 @ 08:24)    [ ]PPSV2 < or = to 30% [ ]significant weight loss  [ ]poor nutritional intake  [ ]anasarca[ ]Artificial Nutrition      Other REFERRALS:  [ ]Hospice  [ ]Child Life  [ ]Social Work  [ ]Case management [ ]Holistic Therapy     Goals of Care Document: EMMA Oliver (10-12-21 @ 13:00)  Goals of Care Conversation:   Participants:  · Participants  Patient    Advance Directives:  · Does patient have Advance Directive  No  · Do you want to complete the HCP and name a Magnus Care Agent  Yes  pending    Conversation Discussion:  · Conversation  MOLST Discussed  · Conversation Details  Pt was seen for goals of care conversation. Pt was provided the video code BHJN and MOLST form for completion.  pt was very receptive and was happy that this service was provided.    What Matters Most To Patient and Family:  · What matters most to patient and family  Remain full code, wants to return to his home and personal life.    Personal Advance Directives Treatment Guidelines:   Treatment Guidelines:  · Decision Maker  Patient  · Treatment Guideline Comments  full code    Location of Discussion:   Duration of Advanced Care Planning Meeting:  · Time spent (in minutes)  6    Location of Discussion:  · Location of discussion  Face to face      Electronic Signatures:  La Oliver (TAVO)  (Signed 12-Oct-2021 18:17)  	Authored: Goals of Care Conversation, Personal Advance Directives Treatment Guidelines, Location of Discussion      Last Updated: 12-Oct-2021 18:17 by La Oliver (RN)         HPI:  Patient is a 78 year old male with a past medical history of coronary artery disease s/p stent (06/2023), hypertension, hyperlipidemia, alcohol disorder, type 2 diabetes mellitus, spinal surgeries, and neuroendocrine tumor on Lutathera (first treatment on 10/20/2023 at [facility] [place] who presented with fatigue and chest pain. Patient's symptoms began following his first treatment of Lutathera. Patient's chest pain is located in the mid-left lower chest pain, described as pressure/pulling, and rated a 6/10 in severity Patient denies radiation and is unable to identify alleviating or aggravating factors at this time.  Patient has not had this type of pain in the past and has been managing his pain conservatively without the use of analgesic medications. Patient also endorses bilateral lower extremity pain from his knees to his feet associated with his lower extremity edema. Patient reports his is bilateral lower extremity pain began a year ago, is described as achy, and currently a 6/10 in severity. Patient denies radiation and is unable to identify any aggravating factors at this time. He endorses that his compression stockings and compression boots which he uses daily at home help alleviate his lower extremity pain. He also manages this pain conservatively and does not take analgesic medications. This admission patient is ordered for acetaminophen 975 mg q6 hours PRN and oxycodone IR 5 mg q6 hours PRN for pain however he has not requested any analgesic medications to date. Goals of care also discussed with patient and he expects to be discharged home soon so he can resume care for his wife.        Of note, CT imaging negative for PE but showed interval worsening of metastatic hepatic lesions. TTE (10/25/2023) unrevealing for a cardiac source of pain.     PERTINENT PM/SXH:   Hypertension    Hypercholesterolemia    PAD (peripheral artery disease)    Neuroendocrine carcinoma      S/P knee replacement, bilateral    S/P hip replacement    History of hernia repair    H/O laminectomy    History of lumbosacral spine surgery      FAMILY HISTORY: reviewed and felt to be non contributory    Family Hx substance abuse [ ]yes [ x]no  ITEMS NOT CHECKED ARE NOT PRESENT    SOCIAL HISTORY:   Significant other/partner[x ]  Children[ ]  Mandaeism/Spirituality:  Substance hx:  [ ]   Tobacco hx:  [ ]   Alcohol hx: [x]   Home Opioid hx:  [x ] I-Stop Reference No:  Living Situation: [x]Home  [ ]Long term care  [ ]Rehab [ ]Other    ADVANCE DIRECTIVES:    DNR/MOLST  [ ]  Living Will  [ ]   DECISION MAKER(s):  [ ] Health Care Proxy(s)  [x ] Surrogate(s)  [ ] Guardian           Name(s): Phone Number(s): Yamilet (spouse), number per EMR    BASELINE (I)ADL(s) (prior to admission):  [place]: [x]Total  [x ] Moderate [ ]Dependent    Allergies    No Known Allergies    Intolerances    MEDICATIONS  (STANDING):  allopurinol 200 milliGRAM(s) Oral daily  aspirin enteric coated 81 milliGRAM(s) Oral daily  atorvastatin 80 milliGRAM(s) Oral at bedtime  cefepime   IVPB 1000 milliGRAM(s) IV Intermittent every 12 hours  cefepime   IVPB 1000 milliGRAM(s) IV Intermittent once  cefepime   IVPB      chlorhexidine 2% Cloths 1 Application(s) Topical daily  clopidogrel Tablet 75 milliGRAM(s) Oral daily  dextrose 5%. 1000 milliLiter(s) (100 mL/Hr) IV Continuous <Continuous>  dextrose 5%. 1000 milliLiter(s) (50 mL/Hr) IV Continuous <Continuous>  dextrose 50% Injectable 25 Gram(s) IV Push once  dextrose 50% Injectable 12.5 Gram(s) IV Push once  dextrose 50% Injectable 25 Gram(s) IV Push once  enoxaparin Injectable 40 milliGRAM(s) SubCutaneous every 24 hours  glucagon  Injectable 1 milliGRAM(s) IntraMuscular once  insulin lispro (ADMELOG) corrective regimen sliding scale   SubCutaneous three times a day before meals  insulin lispro (ADMELOG) corrective regimen sliding scale   SubCutaneous at bedtime  lactated ringers. 1000 milliLiter(s) (500 mL/Hr) IV Continuous <Continuous>  metoprolol succinate ER 25 milliGRAM(s) Oral daily  pantoprazole    Tablet 40 milliGRAM(s) Oral before breakfast  polyethylene glycol 3350 17 Gram(s) Oral daily  senna 2 Tablet(s) Oral at bedtime    MEDICATIONS  (PRN):  acetaminophen     Tablet .. 975 milliGRAM(s) Oral every 6 hours PRN Temp greater or equal to 38C (100.4F), Moderate Pain (4 - 6)  dextrose Oral Gel 15 Gram(s) Oral once PRN Blood Glucose LESS THAN 70 milliGRAM(s)/deciliter  oxyCODONE    IR 5 milliGRAM(s) Oral every 6 hours PRN Severe Pain (7 - 10)    PRESENT SYMPTOMS: [ ]Unable to self-report  [ ] CPOT [ ] PAINADs [ ] RDOS  Source if other than patient:  [ ]Family   [ ]Team     Pain: [x]yes [ ]no  QOL impact - patient is able to complete his ADLs and care for his wife despite the pain   Location - Mid left lower chest                   Aggravating factors - none  Quality - Pressure/pulling  Radiation - None  Timing - Constant  Severity (0-10 scale): 6/10  Minimal acceptable level (0-10 scale): 2/10     QOL impact - patient is able to complete his ADLs and care for his wife despite the pain   Location - Bilateral Left Lower extremities from knees to feet                    Aggravating factors - none  Quality - Achy  Radiation - None  Timing - Constant  Severity (0-10 scale): 6/10  Minimal acceptable level (0-10 scale): 2/10     CPOT:    https://www.[email or web address]/getattachment/[email or web address]/Critical-Care-Pain-Observation-Tool-(CPOT)    PAIN AD Score:   http://geriatrictoolkit.[facility]/cog/painad.pdf (press ctrl +  left click to view)    Dyspnea:                           [ ]Mild [ ]Moderate [ ]Severe      RDOS: 0  0 to 2  minimal or no respiratory distress   3  mild distress  4 to 6 moderate distress  >7 severe distress  https://homecareinformation.net/handouts/hen/Respiratory_Distress_Observation_Scale.pdf (Ctrl +  left click to view)     Anxiety:                             [ ]Mild [ ]Moderate [ ]Severe  Fatigue:                             [ ]Mild [ ]Moderate [ ]Severe  Nausea:                             [ ]Mild [ ]Moderate [ ]Severe  Loss of appetite:              [ ]Mild [ ]Moderate [ ]Severe  Constipation:                    [ ]Mild [ ]Moderate [ ]Severe    PCSSQ[Palliative Care Spiritual Screening Question]   Severity (0-10):  Score of 4 or > indicate consideration of Chaplaincy referral.  Chaplaincy Referral: [ ] yes [x] refused [ ] following [ ] Deferred     Caregiver Amity? : [ ] yes [ ] no [ ] Deferred [ ] Declined             Social work referral [ ] Patient & Family Centered Care Referral [ ]     Anticipatory Grief present?:  [ ] yes [x] no  [ ] Deferred                  Social work referral [ ] Chaplaincy Referral[ ]      Other Symptoms:  [x]All other review of systems negative     Palliative Performance Status Version 2:       60  %    http://[facility].org/files/news/palliative_performance_scale_ppsv2.pdf  PHYSICAL EXAM:    Vital Signs Last 24 Hrs  T(C): 37.8 (26 Oct 2023 08:47), Max: 39.2 (26 Oct 2023 06:39)  T(F): 100.1 (26 Oct 2023 08:47), Max: 102.5 (26 Oct 2023 06:39)  HR: 93 (26 Oct 2023 08:47) (84 - 107)  BP: 94/59 (26 Oct 2023 08:47) (94/59 - 117/70)  BP(mean): --  RR: 18 (26 Oct 2023 08:47) (18 - 18)  SpO2: 97% (26 Oct 2023 08:47) (95% - 97%)    Parameters below as of 26 Oct 2023 08:47  Patient On (Oxygen Delivery Method): room air     I&O's Summary    25 Oct 2023 07:01  -  26 Oct 2023 07:00  --------------------------------------------------------  IN: 200 mL / OUT: 300 mL / NET: -100 mL      GENERAL: [ ]Cachexia    [x]Alert  [x]Oriented x   [ ]Lethargic  [ ]Unarousable  [x]Verbal  [ ]Non-Verbal  Behavioral:   [ ] Anxiety  [ ] Delirium [ ] Agitation [ ] Other  HEENT:  [x]Normal   [ ]Dry mouth   [ ]ET Tube/Trach  [ ]Oral lesions  PULMONARY:   [ X]Clear [ ]Tachypnea  [ ]Audible excessive secretions   [ ]Rhonchi        [ ]Right [ ]Left [ ]Bilateral  [ ]Crackles        [ ]Right [ ]Left [ ]Bilateral  [ ]Wheezing     [ ]Right [ ]Left [ ]Bilateral  [ ]Diminished breath sounds [ ]right [ ]left [ ]bilateral  CARDIOVASCULAR:    [X ]Regular [ ]Irregular [ ]Tachy  [ ]Lavelle [ ]Murmur [ ]Other  GASTROINTESTINAL:  [ X]Soft  [ ]Distended   [ ]+BS  [ X]Non tender [ ]Tender  [ ]Other [ ]PEG [ ]OGT/ NGT  Last BM:  GENITOURINARY:  [ X]Normal [ ] Incontinent   [ ]Oliguria/Anuria   [ ]Ivory  MUSCULOSKELETAL:   [ ]Normal   [X ]Weakness  [ ]Bed/Wheelchair bound [x]Edema  NEUROLOGIC:   [x]No focal deficits  [ ]Cognitive impairment  [ ]Dysphagia [ ]Dysarthria [ ]Paresis [ ]Other   SKIN:   [x]Normal  [ ]Rash  [ ]Other  [ ]Pressure ulcer(s)       Present on admission [ ]y [ ]n    CRITICAL CARE:  [ ] Shock Present  [ ]Septic [ ]Cardiogenic [ ]Neurologic [ ]Hypovolemic  [ ]  Vasopressors [ ]  Inotropes   [ ]Respiratory failure present [ ]Mechanical ventilation [ ]Non-invasive ventilatory support [ ]High flow    [ ]Acute  [ ]Chronic [ ]Hypoxic  [ ]Hypercarbic [ ]Other  [ ]Other organ failure     LABS:                        11.1   12.58 )-----------( 250      ( 26 Oct 2023 07:04 )             33.6   10-26    134<L>  |  102  |  33<H>  ----------------------------<  152<H>  4.3   |  17<L>  |  1.45<H>    Ca    9.3      26 Oct 2023 06:59  Phos  3.7     10-26  Mg     1.9     10-26    TPro  6.5  /  Alb  2.8<L>  /  TBili  0.7  /  DBili  x   /  AST  72<H>  /  ALT  49<H>  /  AlkPhos  134<H>  10-26      Urinalysis Basic - ( 26 Oct 2023 06:59 )    Color: x / Appearance: x / SG: x / pH: x  Gluc: 152 mg/dL / Ketone: x  / Bili: x / Urobili: x   Blood: x / Protein: x / Nitrite: x   Leuk Esterase: x / RBC: x / WBC x   Sq Epi: x / Non Sq Epi: x / Bacteria: x      RADIOLOGY & ADDITIONAL STUDIES:    CT Angio Chest/CT Abdomen-Pelvis (10/23/2023)    IMPRESSION:  No pulmonary embolism. Evaluation of the segmental/subsegmental arteries   is limited due to suboptimal arterial opacification and respiratory   motion artifact.    Multiple metastatic hepatic lesions, increased in size and number since   8/16/2023.    Chest X-ray (10/26/2023)      FINDINGS:    Clear lungs.  No pneumothorax or large pleural effusion.  Heart size cannot be accurately assessed in this projection.    IMPRESSION:    Clear lungs.    TTE (10/25/2023)     CONCLUSIONS:      1. Left ventricular cavity is normal. Left ventricular wall thickness is mildly increased. Left ventricular systolic function is normal with an ejection fraction of 64 % by Avila's method of disks. There are no regional wall motion abnormalities seen.   2. Normal left ventricular diastolic function, with normal filling pressure.   3. Normal right ventricular cavity size, wall thickness, and systolic function.   4. The left atrium is moderately dilated in size.   5. No significant valvular disease.   6. No pericardial effusion seen.   7. No prior echocardiogram is available for comparison.      PROTEIN CALORIE MALNUTRITION PRESENT: [ ]mild [ ]moderate [ ]severe [ ]underweight [ ]morbid obesity  https://www.andeal.org/vault/2440/web/files/ONC/Table_Clinical%20Characteristics%20to%20Document%20Malnutrition-White%20JV%20et%20al%202012.pdf    Height (cm): 182.9 (10-23-23 @ 16:06), 182.9 (06-23-23 @ 08:24)  Weight (kg): 120.2 (10-23-23 @ 16:06), 124.697 (09-08-23 @ 13:00), 124.693 (06-23-23 @ 08:24)  BMI (kg/m2): 35.9 (10-23-23 @ 16:06), 37.3 (09-08-23 @ 13:00), 37.3 (06-23-23 @ 08:24)    [ ]PPSV2 < or = to 30% [ ]significant weight loss  [ ]poor nutritional intake  [ ]anasarca[ ]Artificial Nutrition      Other REFERRALS:  [ ]Hospice  [ ]Child Life  [ ]Social Work  [ X]Case management [ ]Holistic Therapy     Goals of Care Document: EMMA Oliver (10-12-21 @ 13:00)  Goals of Care Conversation:   Participants:  · Participants  Patient    Advance Directives:  · Does patient have Advance Directive  No  · Do you want to complete the HCP and name a [facility] Care Agent  Yes  pending    Conversation Discussion:  · Conversation  MOLST Discussed  · Conversation Details  Pt was seen for goals of care conversation. Pt was provided the video code BHJN and MOLST form for completion.  pt was very receptive and was happy that this service was provided.    What Matters Most To Patient and Family:  · What matters most to patient and family  Remain full code, wants to return to his home and personal life.    Personal Advance Directives Treatment Guidelines:   Treatment Guidelines:  · Decision Maker  Patient  · Treatment Guideline Comments  full code    Location of Discussion:   Duration of Advanced Care Planning Meeting:  · Time spent (in minutes)  6    Location of Discussion:  · Location of discussion  Face to face      Electronic Signatures:  La Oliver (TAVO)  (Signed 12-Oct-2021 18:17)  	Authored: Goals of Care Conversation, Personal Advance Directives Treatment Guidelines, Location of Discussion      Last Updated: 12-Oct-2021 18:17 by La Oliver)

## 2023-10-26 NOTE — CONSULT NOTE ADULT - ASSESSMENT
Mr. Holly Espinal is a 78 year old male with a history of CAD (s/p stent 06/2023), HTN, HLD, EtOH disorder, T2DM, spinal surgeries, and neuroendocrine tumor on Lutathera (first treatment on 10/20/2023 at [facility] [place]) who presented with fatigue and non-radiating mid left lower chest pain following his first Lutathera treatment. Imaging thus far has been unrevealing in terms of a cardiac source of pain but do demonstrate interval worsening in the size and number of his liver metastases which may be contributing to his pain. At home, patient has managed his pain without the use of medications and has not requested any PRN pain medications this admission.     Plan:    Chest pain  - Continue with PRN Tylenol 975 mg q6h PRN and oxycodone IR 5mg q6h PRN  - Patient aware these medications are available to him should his pain exacerbate but would like to remain off of pain medications at this time  - Can discuss alternative oral analgesics if patient is amenable at a future visit    Goals of care  - Patient would like to remain full code and wants to return to his home and personal life to resume care for his wife  - Declined  services at this time    Please see attending attestation for final recommendations Mr. Holly Espinal is a 78 year old male with a history of CAD (s/p stent 06/2023), HTN, HLD, EtOH disorder, T2DM, spinal surgeries, and neuroendocrine tumor on Lutathera (first treatment on 10/20/2023 at [facility] [place]) who presented with fatigue and non-radiating mid left lower chest pain following his first Lutathera treatment. Imaging thus far has been unrevealing in terms of a cardiac source of pain but do demonstrate interval worsening in the size and number of his liver metastases which may be contributing to his pain. At home, patient has managed his pain without the use of medications and has not requested any PRN pain medications this admission.

## 2023-10-26 NOTE — CONSULT NOTE ADULT - PROBLEM SELECTOR RECOMMENDATION 9
- Continue with PRN Tylenol 975 mg q6h PRN and oxycodone IR 5mg q6h PRN  - Patient aware these medications are available to him should his pain exacerbate but would like to remain off of opiate pain medications at this time  - Can discuss alternative oral analgesics if patient is amenable at a future visit

## 2023-10-26 NOTE — PROVIDER CONTACT NOTE (CRITICAL VALUE NOTIFICATION) - ASSESSMENT
Pt. A&Ox4, VSS. Pt. has no complaints of chest pain. Pt. on tele, no events on tele.
Pt has no signs of acute distress, pt on telemetry

## 2023-10-26 NOTE — CONSULT NOTE ADULT - PROBLEM SELECTOR RECOMMENDATION 3
- Patient would like to remain full code and wants to return to his home and personal life to resume care for his wife  - Declined  services at this time    please page in the event of uncontrolled symptoms  [phone]

## 2023-10-26 NOTE — PROGRESS NOTE ADULT - ASSESSMENT
78 M with hx of CAD s/p NURIA to distal LAD in 6/2023, HTN, HLD, EtOH disorder, T2DM, spinal surgeries, neuroendocrine tumor on [place]utathera [place] [facility] [place] presenting with fatigue and chest pain, fever + SIRS criteria. Concern for reaction to Lutathera vs progression of disease given start of symptoms, still with fever

## 2023-10-26 NOTE — PROVIDER CONTACT NOTE (CRITICAL VALUE NOTIFICATION) - BACKGROUND
Pt dx fever due to unspecified condition, neuroendocrine carcinoma
Pt. admitted with fever, chest pain and elevated troponin. PMHx of neuroendocrine carcinoma, PAD, hypertension

## 2023-10-26 NOTE — PROGRESS NOTE ADULT - SUBJECTIVE AND OBJECTIVE BOX
Patient is a 78y old  Male who presents with a chief complaint of Chest pain (26 Oct 2023 13:00)    Pt seen and examined at bedside. Reports he has been feeling a bit better, not out of bed due to weakness. Febrile overnight.    MEDICATIONS  (STANDING):  allopurinol 200 milliGRAM(s) Oral daily  aspirin enteric coated 81 milliGRAM(s) Oral daily  atorvastatin 80 milliGRAM(s) Oral at bedtime  cefepime   IVPB 1000 milliGRAM(s) IV Intermittent every 12 hours  cefepime   IVPB      chlorhexidine 2% Cloths 1 Application(s) Topical daily  clopidogrel Tablet 75 milliGRAM(s) Oral daily  dextrose 5%. 1000 milliLiter(s) (100 mL/Hr) IV Continuous <Continuous>  dextrose 5%. 1000 milliLiter(s) (50 mL/Hr) IV Continuous <Continuous>  dextrose 50% Injectable 25 Gram(s) IV Push once  dextrose 50% Injectable 25 Gram(s) IV Push once  dextrose 50% Injectable 12.5 Gram(s) IV Push once  enoxaparin Injectable 40 milliGRAM(s) SubCutaneous every 24 hours  glucagon  Injectable 1 milliGRAM(s) IntraMuscular once  insulin lispro (ADMELOG) corrective regimen sliding scale   SubCutaneous at bedtime  insulin lispro (ADMELOG) corrective regimen sliding scale   SubCutaneous three times a day before meals  lactated ringers. 1000 milliLiter(s) (500 mL/Hr) IV Continuous <Continuous>  metoprolol succinate ER 25 milliGRAM(s) Oral daily  pantoprazole    Tablet 40 milliGRAM(s) Oral before breakfast  polyethylene glycol 3350 17 Gram(s) Oral daily  senna 2 Tablet(s) Oral at bedtime    MEDICATIONS  (PRN):  acetaminophen     Tablet .. 975 milliGRAM(s) Oral every 6 hours PRN Temp greater or equal to 38C (100.4F), Moderate Pain (4 - 6)  dextrose Oral Gel 15 Gram(s) Oral once PRN Blood Glucose LESS THAN 70 milliGRAM(s)/deciliter  oxyCODONE    IR 5 milliGRAM(s) Oral every 6 hours PRN Severe Pain (7 - 10)    Vital Signs Last 24 Hrs  T(C): 37.8 (26 Oct 2023 08:47), Max: 39.2 (26 Oct 2023 06:39)  T(F): 100.1 (26 Oct 2023 08:47), Max: 102.5 (26 Oct 2023 06:39)  HR: 93 (26 Oct 2023 08:47) (84 - 107)  BP: 94/59 (26 Oct 2023 08:47) (94/59 - 117/70)  BP(mean): --  RR: 18 (26 Oct 2023 08:47) (18 - 18)  SpO2: 97% (26 Oct 2023 08:47) (95% - 97%)    Parameters below as of 26 Oct 2023 08:47  Patient On (Oxygen Delivery Method): room air        PE  NAD  Awake, alert  Anicteric, MMM  No c/c/e  No rash grossly  FROM                          11.1   12.58 )-----------( 250      ( 26 Oct 2023 07:04 )             33.6       10-26    134<L>  |  102  |  33<H>  ----------------------------<  152<H>  4.3   |  17<L>  |  1.45<H>    Ca    9.3      26 Oct 2023 06:59  Phos  3.7     10-26  Mg     1.9     10-26    TPro  6.5  /  Alb  2.8<L>  /  TBili  0.7  /  DBili  x   /  AST  72<H>  /  ALT  49<H>  /  AlkPhos  134<H>  10-26

## 2023-10-26 NOTE — CONSULT NOTE ADULT - ASSESSMENT
Impression:  78 M with hx of CAD s/p NURIA to distal LAD in 6/2023, HTN, HLD, EtOH disorder, T2DM, spinal surgeries, neuroendocrine tumor on Lutathera [place] [facility] [place] presenting with fatigue and chest pain since Friday. Patient reports severe fatigue starting Friday after his first treatment with Lutathera. Patient found to have WBC of 13.62 now downtrended to 12.58 along with persistent fevers with Tmax of 102.5 despite a dose of cefepime 1x and Ceftriaxone 2x. CT imaging with multiple hepatic metastatic lesions increased in size from previous imaging.     Antimicrobials:  Cefepime 10/23  Ceftriaxone: 10/24 - 25    Assessment:  *SIRS as evidenced by pyrexia (tmax 102.5), leukocytosis and tachycardia without source of infection, patient with active malignancy with metastatic disease which could be the main factor in his presentation, but since patient is on chemotherapy and could be considered immunocompromised infection is still in the differential  *Neuroendocrine tumor on Lutathera (first [place] [facility] with metastatic disease to the liver   *EtOH disorder    Recommendations: PLEASE DEFER ALL CHANGES IN PLAN UNTIL SIGNED BY ATTENDING. All recommendations are tentative pending Attending Attestation.  - obtain repeat blood culture x2  - obtain RVP  - trend fever and WBC curve   - defer antibiotics at this time no obvious source of infection and WBC trending down, low threshold for starting antibiotics if patient develops worsening WBC or symptoms    Shalom Lua DO, PGY-4   Infectious Disease Fellow  [employer] Teams Preferred  After 5pm/weekends call [phone]  Impression:  78 M with hx of CAD s/p NURIA to distal LAD in 6/2023, HTN, HLD, EtOH disorder, T2DM, spinal surgeries, neuroendocrine tumor on Lutathera (first treatment on Friday 10[place]20 [place] [facility] [place] presenting with fatigue and chest pain since Friday. Patient reports severe fatigue starting Friday after his first treatment with Lutathera. Patient found to have WBC of 13.62 now downtrended to 12.58 along with persistent fevers with Tmax of 102.5 despite a dose of cefepime 1x and Ceftriaxone 2x. CT imaging with multiple hepatic metastatic lesions increased in size from previous imaging.     Antimicrobials:  Cefepime 10/23  Ceftriaxone: 10/24 - 25    Assessment:  *SIRS as evidenced by pyrexia (tmax 102.5), leukocytosis and tachycardia without source of infection, patient with active malignancy with metastatic disease which could be the main factor in his presentation, but since patient is on chemotherapy and could be considered immunocompromised infection is still in the differential  *Neuroendocrine tumor on Lutathera (first treatment 10/20 at [facility] [place]) with metastatic disease to the liver   *EtOH disorder    Recommendations:   - obtain repeat blood culture x2  - obtain RVP  - trend fever and WBC curve   - defer antibiotics at this time no obvious source of infection and WBC trending down, low threshold for starting antibiotics if patient develops worsening WBC or symptoms    Shalom Lua DO, PGY-4   Infectious Disease Fellow  [facility] Teams Preferred  After 5pm/weekends call [phone]

## 2023-10-26 NOTE — PROGRESS NOTE ADULT - PROBLEM SELECTOR PROBLEM 1
Attempted to call mother  Voicemail came on however interrupted as I was speaking and stated "Nothing has been recorded please leave a message or hang up"    If mother returns call to office, inform her that she may get the Cosimo Crigler and flavor with chocolate or strawberry syrup at home in the meantime until available  We can also attempt to substitute for an alternative supplement if patient will tolerate the change  Chemotherapy adverse reaction

## 2023-10-26 NOTE — CONSULT NOTE ADULT - SUBJECTIVE AND OBJECTIVE BOX
Patient is a 78y old  Male who presents with a chief complaint of Chest pain     HPI:  78 M with hx of CAD s/p NURIA to distal LAD in 6/2023, HTN, HLD, EtOH disorder, T2DM, spinal surgeries, [place]ocrine [place] Lutathera [place] [facility] [place] with fatigue and chest pain since Friday. Patient reports severe fatigue starting Friday after his first treatment with Lutathera. Pt also reported mid-left lower chest pain described as pressure. No radiation, no aggravating or alleviating factors. Pt has never felt this type of chest pain before. Denies palpitations, diaphoresis, dyspnea, nausea, vomiting. Pt has diffuse abdominal pain. Also with constipation; last BM several days ago. No other acute complaints. In ED, patient developed fever with Tmax 100.7. CT imaging negative for PE but showed interval worsening of metastatic hepatic lesions. UA unremarkable. Troponin 73 --> 82, EKG showing sinus tachycardia with PACs. Upon work up patient was found to have WBC 13.62 which now trended down to 12.58. Patient also continuing to have fevers with Tmax of 102.5. Imaging with CTA of chest and abdomen showed no pulmonary process and shows multiple hepatic metastatic lesions that have increased in size since previous exam.       prior hospital charts reviewed [  ]  primary team notes reviewed [  ]  other consultant notes reviewed [  ]    PAST MEDICAL & SURGICAL HISTORY:  Hypertension      Hypercholesterolemia      PAD (peripheral artery disease)      Neuroendocrine carcinoma      S/P knee replacement, bilateral      S/P hip replacement  right hip      History of hernia repair      H/O laminectomy      History of lumbosacral spine surgery          Allergies  No Known Allergies    ANTIMICROBIALS (past 90 days)  MEDICATIONS  (STANDING):  cefepime   IVPB   100 mL/Hr IV Intermittent (10-23-23 @ 18:09)    cefTRIAXone   IVPB   100 mL/Hr IV Intermittent (10-25-23 @ 04:54)   100 mL/Hr IV Intermittent (10-24-23 @ 05:01)          MEDICATIONS  (STANDING):  acetaminophen     Tablet .. 975 every 6 hours PRN  allopurinol 200 daily  aspirin enteric coated 81 daily  atorvastatin 80 at bedtime  clopidogrel Tablet 75 daily  dextrose 50% Injectable 12.5 once  dextrose 50% Injectable 25 once  dextrose 50% Injectable 25 once  dextrose Oral Gel 15 once PRN  enoxaparin Injectable 40 every 24 hours  glucagon  Injectable 1 once  insulin lispro (ADMELOG) corrective regimen sliding scale  three times a day before meals  insulin lispro (ADMELOG) corrective regimen sliding scale  at bedtime  metoprolol succinate ER 25 daily  oxyCODONE    IR 5 every 6 hours PRN  pantoprazole    Tablet 40 before breakfast  polyethylene glycol 3350 17 daily  senna 2 at bedtime    SOCIAL HISTORY:       FAMILY HISTORY:    ROS:  Pending full examination    Vital Signs Last 24 Hrs  T(F): 100.1 (10-26-23 @ 08:47), Max: 102.5 (10-26-23 @ 06:39)  Vital Signs Last 24 Hrs  HR: 93 (10-26-23 @ 08:47) (84 - 107)  BP: 94/59 (10-26-23 @ 08:47) (94/59 - 117/70)  RR: 18 (10-26-23 @ 08:47)  SpO2: 97% (10-26-23 @ 08:47) (95% - 97%)  Wt(kg): --    Physical Exam:  Pending full examination                          11.1   12.58 )-----------( 250      ( 26 Oct 2023 07:04 )             33.6   10-26    134<L>  |  102  |  33<H>  ----------------------------<  152<H>  4.3   |  17<L>  |  1.45<H>    Ca    9.3      26 Oct 2023 06:59  Phos  3.7     10-26  Mg     1.9     10-26    TPro  6.5  /  Alb  2.8<L>  /  TBili  0.7  /  DBili  x   /  AST  72<H>  /  ALT  49<H>  /  AlkPhos  134<H>  10-26    Urinalysis Basic - ( 26 Oct 2023 06:59 )    Color: x / Appearance: x / SG: x / pH: x  Gluc: 152 mg/dL / Ketone: x  / Bili: x / Urobili: x   Blood: x / Protein: x / Nitrite: x   Leuk Esterase: x / RBC: x / WBC x   Sq Epi: x / Non Sq Epi: x / Bacteria: x    MICROBIOLOGY:  Culture - Urine (collected 23 Oct 2023 20:12)  Source: Clean Catch Clean Catch (Midstream)  Final Report (25 Oct 2023 09:38):    <10,000 CFU/mL Normal Urogenital [place]    Culture - Blood (collected 23 Oct 2023 17:45)  Source: .Blood Blood-Peripheral  Preliminary Report (25 Oct 2023 22:01):    No growth at 48 Hours    Culture - Blood (collected 23 Oct 2023 17:30)  Source: .Blood Blood-Peripheral  Preliminary Report (25 Oct 2023 22:01):    No growth at 48 Hours        RADIOLOGY:  < from: CT Angio Chest PE Protocol w/ IV Cont (10.23.23 @ 18:58) >  IMPRESSION:  No pulmonary embolism. Evaluation of the segmental/subsegmental arteries   is limited due to suboptimal arterial opacification and respiratory   motion artifact.    Multiple metastatic hepatic lesions, increased in size and number since   8/16/2023.       Patient is a 78y old  Male who presents with a chief complaint of Chest pain     HPI:  78 M with hx of CAD s/p NURIA to distal LAD in 6/2023, HTN, HLD, EtOH disorder, T2DM, spinal surgeries, neuroendocrine tumor on Lutathera (first treatment on Friday 10[place]20 at [facility] [place] presenting with fatigue and chest pain since Friday. Patient reports severe fatigue starting Friday after his first treatment with Lutathera. Pt also reported mid-left lower chest pain described as pressure. No radiation, no aggravating or alleviating factors. Pt has never felt this type of chest pain before. Denies palpitations, diaphoresis, dyspnea, nausea, vomiting. Pt has diffuse abdominal pain. Also with constipation; last BM several days ago. No other acute complaints. In ED, patient developed fever with Tmax 100.7. CT imaging negative for PE but showed interval worsening of metastatic hepatic lesions. UA unremarkable. Troponin 73 --> 82, EKG showing sinus tachycardia with PACs. Upon work up patient was found to have WBC 13.62 which now trended down to 12.58. Patient also continuing to have fevers with Tmax of 102.5. Imaging with CTA of chest and abdomen showed no pulmonary process and shows multiple hepatic metastatic lesions that have increased in size since previous exam. Patient states that he didn't feel any of his fevers. His main complaint is that he is constipated along with fatigue. He also admits to some LE edema.       prior hospital charts reviewed [  ]  primary team notes reviewed [  ]  other consultant notes reviewed [  ]    PAST MEDICAL & SURGICAL HISTORY:  Hypertension      Hypercholesterolemia      PAD (peripheral artery disease)      Neuroendocrine carcinoma      S/P knee replacement, bilateral      S/P hip replacement  right hip      History of hernia repair      H/O laminectomy      History of lumbosacral spine surgery          Allergies  No Known Allergies    ANTIMICROBIALS (past 90 days)  MEDICATIONS  (STANDING):  cefepime   IVPB   100 mL/Hr IV Intermittent (10-23-23 @ 18:09)    cefTRIAXone   IVPB   100 mL/Hr IV Intermittent (10-25-23 @ 04:54)   100 mL/Hr IV Intermittent (10-24-23 @ 05:01)          MEDICATIONS  (STANDING):  acetaminophen     Tablet .. 975 every 6 hours PRN  allopurinol 200 daily  aspirin enteric coated 81 daily  atorvastatin 80 at bedtime  clopidogrel Tablet 75 daily  dextrose 50% Injectable 12.5 once  dextrose 50% Injectable 25 once  dextrose 50% Injectable 25 once  dextrose Oral Gel 15 once PRN  enoxaparin Injectable 40 every 24 hours  glucagon  Injectable 1 once  insulin lispro (ADMELOG) corrective regimen sliding scale  three times a day before meals  insulin lispro (ADMELOG) corrective regimen sliding scale  at bedtime  metoprolol succinate ER 25 daily  oxyCODONE    IR 5 every 6 hours PRN  pantoprazole    Tablet 40 before breakfast  polyethylene glycol 3350 17 daily  senna 2 at bedtime    SOCIAL HISTORY:       FAMILY HISTORY:    ROS:  Constitutional: No fevers, chills, weight loss, Positive fatigue   Skin: No rash, no phlebitis	  Eyes: No discharge	  ENMT: No sore throat, oral thrush, ulcers or exudate  Respiratory: No cough, no SOB  Cardiovascular:  No chest pain, palpitations, Positive LE edema   Gastrointestinal: No pain, nausea, vomiting, diarrhea, Positive constipation	  Genitourinary: No dysuria, discharge or flank pain  MSK: No arthralgias or back pain   Neurological: No HA, no weakness, no seizures, no AMS     Vital Signs Last 24 Hrs  T(F): 100.1 (10-26-23 @ 08:47), Max: 102.5 (10-26-23 @ 06:39)  Vital Signs Last 24 Hrs  HR: 93 (10-26-23 @ 08:47) (84 - 107)  BP: 94/59 (10-26-23 @ 08:47) (94/59 - 117/70)  RR: 18 (10-26-23 @ 08:47)  SpO2: 97% (10-26-23 @ 08:47) (95% - 97%)  Wt(kg): --    Physical Exam:  General: Patient appears comfortable, no acute distress, slightly diaphoretic  HEENT: NCAT, PERRL, anicteric sclera, mucous membranes moist and intact  Neck: Supple, No lymphadenopathy  CV: +S1/S2, RRR, no M/R/G  Lungs: No respiratory distress, CTA b/l, no wheezing, rales or rhonchi  Abd:  BS4+, Soft, slight comfort in the RUQ, no guarding  : No suprapubic tenderness  Neuro: AAOx3. No focal deficits noted.   Ext: No cyanosis, slight edema in b/l LE with venous stasis changes, tenderness to palpation b/l  Msk: freely moving upper and lower extremities  Skin: No rash, no phlebitis, erythema or edema                           11.1   12.58 )-----------( 250      ( 26 Oct 2023 07:04 )             33.6   10-26    134<L>  |  102  |  33<H>  ----------------------------<  152<H>  4.3   |  17<L>  |  1.45<H>    Ca    9.3      26 Oct 2023 06:59  Phos  3.7     10-26  Mg     1.9     10-26    TPro  6.5  /  Alb  2.8<L>  /  TBili  0.7  /  DBili  x   /  AST  72<H>  /  ALT  49<H>  /  AlkPhos  134<H>  10-26    Urinalysis Basic - ( 26 Oct 2023 06:59 )    Color: x / Appearance: x / SG: x / pH: x  Gluc: 152 mg/dL / Ketone: x  / Bili: x / Urobili: x   Blood: x / Protein: x / Nitrite: x   Leuk Esterase: x / RBC: x / WBC x   Sq Epi: x / Non Sq Epi: x / Bacteria: x    MICROBIOLOGY:  Culture - Urine (collected 23 Oct 2023 20:12)  Source: Clean Catch Clean Catch (Midstream)  Final Report (25 Oct 2023 09:38):    <10,000 CFU/mL Normal Urogenital [place]    Culture - Blood (collected 23 Oct 2023 17:45)  Source: .Blood Blood-Peripheral  Preliminary Report (25 Oct 2023 22:01):    No growth at 48 Hours    Culture - Blood (collected 23 Oct 2023 17:30)  Source: .Blood Blood-Peripheral  Preliminary Report (25 Oct 2023 22:01):    No growth at 48 Hours        RADIOLOGY:  < from: CT Angio Chest PE Protocol w/ IV Cont (10.23.23 @ 18:58) >  IMPRESSION:  No pulmonary embolism. Evaluation of the segmental/subsegmental arteries   is limited due to suboptimal arterial opacification and respiratory   motion artifact.    Multiple metastatic hepatic lesions, increased in size and number since   8/16/2023.       Patient is a 78y old  Male who presents with a chief complaint of Chest pain     HPI: 78 M with hx of CAD s/p NURIA to distal LAD in 6/2023, HTN, HLD, EtOH disorder, T2DM, spinal surgeries, neuroendocrine tumor on Lutathera (first treatment on Friday 10[place]20 at [facility] [place] presenting with fatigue and chest pain since Friday. Patient reports severe fatigue starting Friday after his first treatment with Lutathera. Pt also reported mid-left lower chest pain described as pressure. No radiation, no aggravating or alleviating factors. Pt has never felt this type of chest pain before. Denies palpitations, diaphoresis, dyspnea, nausea, vomiting. Pt has diffuse abdominal pain. Also with constipation; last BM several days ago. No other acute complaints. In ED, patient developed fever with Tmax 100.7. CT imaging negative for PE but showed interval worsening of metastatic hepatic lesions. UA unremarkable. Troponin 73 --> 82, EKG showing sinus tachycardia with PACs. Upon work up patient was found to have WBC 13.62 which now trended down to 12.58. Patient also continuing to have fevers with Tmax of 102.5. Imaging with CTA of chest and abdomen showed no pulmonary process and shows multiple hepatic metastatic lesions that have increased in size since previous exam. Patient states that he didn't feel any of his fevers. His main complaint is that he is constipated along with fatigue. He also admits to some LE edema.     prior hospital charts reviewed [  ]  primary team notes reviewed [  ]  other consultant notes reviewed [  ]    PAST MEDICAL & SURGICAL HISTORY:  Hypertension  Hypercholesterolemia  PAD (peripheral artery disease)  Neuroendocrine carcinoma  S/P knee replacement, bilateral  S/P hip replacement right hip  History of hernia repair  H/O laminectomy  History of lumbosacral spine surgery    Allergies  No Known Allergies    ANTIMICROBIALS:  cefTRIAXone   IVPB (10/24-10/25)  cefepime   IVPB 1000 every 12 hours (10/23, 10/26-)    MEDICATIONS  (STANDING):  allopurinol 200 daily  aspirin enteric coated 81 daily  atorvastatin 80 at bedtime  clopidogrel Tablet 75 daily  enoxaparin Injectable 40 every 24 hours  insulin lispro (ADMELOG) corrective regimen sliding scale  at bedtime  insulin lispro (ADMELOG) corrective regimen sliding scale  three times a day before meals  metoprolol succinate ER 25 daily  pantoprazole    Tablet 40 before breakfast  polyethylene glycol 3350 17 daily  senna 2 at bedtime    SOCIAL HISTORY:   no tobacco    FAMILY HISTORY:  n/c    ROS:  Constitutional: No fevers, chills, weight loss, Positive fatigue   Skin: No rash, no phlebitis	  Eyes: No discharge	  ENMT: No sore throat, oral thrush, ulcers or exudate  Respiratory: No cough, no SOB  Cardiovascular:  No chest pain, palpitations, Positive LE edema   Gastrointestinal: No pain, nausea, vomiting, diarrhea, Positive constipation	  Genitourinary: No dysuria, discharge or flank pain  MSK: No arthralgias or back pain   Neurological: No HA, no weakness, no seizures, no AMS     Vital Signs Last 24 Hrs  T(F): 100.1 (10-26-23 @ 08:47), Max: 102.5 (10-26-23 @ 06:39)  Vital Signs Last 24 Hrs  HR: 93 (10-26-23 @ 08:47) (84 - 107)  BP: 94/59 (10-26-23 @ 08:47) (94/59 - 117/70)  RR: 18 (10-26-23 @ 08:47)  SpO2: 97% (10-26-23 @ 08:47) (95% - 97%)  Wt(kg): --    Physical Exam:  General: Patient appears comfortable, no acute distress, slightly diaphoretic  HEENT:  anicteric sclera  Neck: Supple, No lymphadenopathy  CV: +S1/S2, RRR, no M/R/G  Lungs: No respiratory distress, CTA b/l, no wheezing, rales or rhonchi  Abd:  BS4+, Soft, slight comfort in the RUQ, no guarding  : No suprapubic tenderness  Neuro: AAOx3. No focal deficits noted.   Ext: No cyanosis, slight edema in b/l LE with venous stasis changes, tenderness to palpation b/l  Msk: freely moving upper and lower extremities  Skin: No rash, no phlebitis, erythema or edema                         11.1   12.58 )-----------( 250      ( 26 Oct 2023 07:04 )             33.6     134<L>  |  102  |  33<H>  ----------------------------<  152<H>  4.3   |  17<L>  |  1.45<H>    Ca    9.3      26 Oct 2023 06:59  Phos  3.7     10-26  Mg     1.9     10-26    TPro  6.5  /  Alb  2.8<L>  /  TBili  0.7  /  DBili  x   /  AST  72<H>  /  ALT  49<H>  /  AlkPhos  134<H>  10-26    Bilirubin Total: 0.7 (10-26)  Bilirubin Total: 0.6 (10-25)  Bilirubin Total: 0.6 (10-24)  Bilirubin Total: 0.6 (10-23)    Alkaline Phosphatase: 134 (10-26)  Alkaline Phosphatase: 119 (10-25)  Alkaline Phosphatase: 137 (10-24)  Alkaline Phosphatase: 155 (10-23)    Alanine Aminotransferase (ALT/SGPT): 49 (10-26)  Alanine Aminotransferase (ALT/SGPT): 48 (10-25)  Alanine Aminotransferase (ALT/SGPT): 68 (10-24)  Alanine Aminotransferase (ALT/SGPT): 80 (10-23)    Aspartate Aminotransferase (AST/SGOT): 72 (10-26)  Aspartate Aminotransferase (AST/SGOT): 63 (10-25)  Aspartate Aminotransferase (AST/SGOT): 133 (10-24)  Aspartate Aminotransferase (AST/SGOT): 145 (10-23)    Urinalysis + Microscopic Examination (10.23.23 @ 20:12)   pH Urine: 5.0  Urine Appearance: Clear  Color: Light Yellow  Specific Gravity: 1.024  Protein, Urine: Trace  Glucose Qualitative, Urine: Negative  Ketone - Urine: Negative  Blood, Urine: Negative  Bilirubin: Negative  Urobilinogen: Negative  Leukocyte Esterase Concentration: Negative  Nitrite: Negative  White Blood Cell - Urine: 2 /HPF  Red Blood Cell - Urine: 2 /hpf  Bacteria: Negative  Hyaline Casts: 6 /lpf  Squamous Epithelial Cells: 1 /hpf    MICROBIOLOGY:  10/26 BC pending    Culture - Urine (collected 23 Oct 2023 20:12)  Source: Clean Catch Clean Catch (Midstream)  Final Report (25 Oct 2023 09:38):    <10,000 CFU/mL Normal Urogenital [place]    Culture - Blood (collected 23 Oct 2023 17:45)  Source: .Blood Blood-Peripheral  Preliminary Report (25 Oct 2023 22:01):    No growth at 48 Hours    Culture - Blood (collected 23 Oct 2023 17:30)  Source: .Blood Blood-Peripheral  Preliminary Report (25 Oct 2023 22:01):    No growth at 48 Hours    RADIOLOGY:  CT Angio Chest PE Protocol w/ IV Cont (10.23.23 @ 18:58) >  IMPRESSION:  No pulmonary embolism. Evaluation of the segmental/subsegmental arteries is limited due to suboptimal arterial opacification and respiratory motion artifact.  Multiple metastatic hepatic lesions, increased in size and number since 8/16/2023.

## 2023-10-26 NOTE — PROGRESS NOTE ADULT - SUBJECTIVE AND OBJECTIVE BOX
DATE OF SERVICE: 10-26-23 @ 10:06    Patient is a 78y old  Male who presents with a chief complaint of Chest pain (25 Oct 2023 15:38)      INTERVAL HISTORY: Feeling slightly better this am. Had chest discomfort overnight.     REVIEW OF SYSTEMS:  CONSTITUTIONAL: No weakness  EYES/ENT: No visual changes;  No throat pain   NECK: No pain or stiffness  RESPIRATORY: No cough, wheezing; No shortness of breath  CARDIOVASCULAR: No chest pain or palpitations  GASTROINTESTINAL: No abdominal  pain. No nausea, vomiting, or hematemesis  GENITOURINARY: No dysuria, frequency or hematuria  NEUROLOGICAL: No stroke like symptoms  SKIN: No rashes    TELEMETRY Personally reviewed: SR 80-90 PVC, MAT 130s   	  MEDICATIONS:  metoprolol succinate ER 25 milliGRAM(s) Oral daily        PHYSICAL EXAM:  T(C): 37.8 (10-26-23 @ 08:47), Max: 39.2 (10-26-23 @ 06:39)  HR: 93 (10-26-23 @ 08:47) (84 - 107)  BP: 94/59 (10-26-23 @ 08:47) (94/59 - 117/70)  RR: 18 (10-26-23 @ 08:47) (18 - 18)  SpO2: 97% (10-26-23 @ 08:47) (95% - 97%)  Wt(kg): --  I&O's Summary    25 Oct 2023 07:01  -  26 Oct 2023 07:00  --------------------------------------------------------  IN: 200 mL / OUT: 300 mL / NET: -100 mL          Appearance: In no distress	  HEENT:    PERRL, EOMI	  Cardiovascular:  S1 S2, No JVD  Respiratory: Lungs clear to auscultation	  Gastrointestinal:  Soft, Non-tender, + BS	  Vascularature:  + edema of LE  Psychiatric: Appropriate affect   Neuro: no acute focal deficits                               11.1   12.58 )-----------( 250      ( 26 Oct 2023 07:04 )             33.6     10-26    134<L>  |  102  |  33<H>  ----------------------------<  152<H>  4.3   |  17<L>  |  1.45<H>    Ca    9.3      26 Oct 2023 06:59  Phos  3.7     10-26  Mg     1.9     10-26    TPro  6.5  /  Alb  2.8<L>  /  TBili  0.7  /  DBili  x   /  AST  72<H>  /  ALT  49<H>  /  AlkPhos  134<H>  10-26        Labs personally reviewed      ASSESSMENT/PLAN: 	    78 M with hx of CAD s/p NURIA to distal LAD in 6/2023, HTN, HLD, EtOH disorder, T2DM, spinal surgeries, neuroendocrine tumor on Lutathera (first treatment on Friday 10/20 at [facility] [place]), presenting with fatigue and chest pain since Friday. Patient reports severe fatigue starting Friday after his first treatment with Lutathera. Pt also reported mid-left lower chest pain described as pressure. No radiation, no aggravating or alleviating factors. Pt has never felt this type of chest pain before. Denies palpitations, diaphoresis, dyspnea, nausea, vomiting. Pt has diffuse abdominal pain. Also with constipation; last BM several days ago. No other acute complaints.      Problem/Plan - 1:  ·  Problem: Chest Pain.   ·  Plan: Location described as LUQ abdomen with pain elicited with palpation, appears noncardiac   - EKG SR with PACs, no ischemic characteristics  - Trop 72-->83--> 81/ CKMB 1.9; 10/26 reports CP, trop increased to 110 likely 2/2 demand i/s/o ST and fever.  - Mildly elevated LFTS and Lipase noted  - Hx of recent PCI  - Repeat TTE unchanged   - CP possibly 2/2 lutathera with known S/E of abdominal discomfort       Problem/Plan - 2:  ·  Problem: Shortness of Breath  ·  Plan: ECG non-ischemic  - TTE wnl.  - BNP 2572. Repeat 4171.  - CT neg for PE, no pulm edema noted  - Will defer diuresis for now for patient with soft BP and infectious workup in progress.   - Low threshold for PRN Lasix if patient become hypoxic or short of breath.      Problem/Plan - 3:  ·  Problem: CAD.   ·  Plan: Recent PCI to pLAD in June 2023  - ECG non-ischemic  - Trop/CKMB as noted above  - c/w ASA, Plavix and statin  - TTE unchanged     Problem/Plan - 4:  ·  Problem: Prophylactic measure.   ·  Plan: dvt ppx: Lovenox.          KIRSTIE Latham, [facility]  Cardiovascular Medicine  [address] [place] [address], [address]  Available through call or text on [employer]  Office: [phone]   DATE OF SERVICE: 10-26-23 @ 10:06    Patient is a 78y old  Male who presents with a chief complaint of Chest pain (25 Oct 2023 15:38)      INTERVAL HISTORY: Feeling slightly better this am. Had chest discomfort overnight.     REVIEW OF SYSTEMS:  CONSTITUTIONAL: No weakness  EYES/ENT: No visual changes;  No throat pain   NECK: No pain or stiffness  RESPIRATORY: No cough, wheezing; No shortness of breath  CARDIOVASCULAR: No chest pain or palpitations  GASTROINTESTINAL: No abdominal  pain. No nausea, vomiting, or hematemesis  GENITOURINARY: No dysuria, frequency or hematuria  NEUROLOGICAL: No stroke like symptoms  SKIN: No rashes    TELEMETRY Personally reviewed: SR 80-90 PVC, MAT 130s   	  MEDICATIONS:  metoprolol succinate ER 25 milliGRAM(s) Oral daily        PHYSICAL EXAM:  T(C): 37.8 (10-26-23 @ 08:47), Max: 39.2 (10-26-23 @ 06:39)  HR: 93 (10-26-23 @ 08:47) (84 - 107)  BP: 94/59 (10-26-23 @ 08:47) (94/59 - 117/70)  RR: 18 (10-26-23 @ 08:47) (18 - 18)  SpO2: 97% (10-26-23 @ 08:47) (95% - 97%)  Wt(kg): --  I&O's Summary    25 Oct 2023 07:01  -  26 Oct 2023 07:00  --------------------------------------------------------  IN: 200 mL / OUT: 300 mL / NET: -100 mL          Appearance: In no distress	  HEENT:    PERRL, EOMI	  Cardiovascular:  S1 S2, No JVD  Respiratory: Lungs clear to auscultation	  Gastrointestinal:  Soft, Non-tender, + BS	  Vascularature:  + edema of LE  Psychiatric: Appropriate affect   Neuro: no acute focal deficits                               11.1   12.58 )-----------( 250      ( 26 Oct 2023 07:04 )             33.6     10-26    134<L>  |  102  |  33<H>  ----------------------------<  152<H>  4.3   |  17<L>  |  1.45<H>    Ca    9.3      26 Oct 2023 06:59  Phos  3.7     10-26  Mg     1.9     10-26    TPro  6.5  /  Alb  2.8<L>  /  TBili  0.7  /  DBili  x   /  AST  72<H>  /  ALT  49<H>  /  AlkPhos  134<H>  10-26        Labs personally reviewed      ASSESSMENT/PLAN: 	    78 M with hx of CAD s/p NURIA to distal LAD in 6/2023, HTN, HLD, EtOH disorder, T2DM, spinal surgeries, neuroendocrine tumor on Lutathera (first treatment on Friday 10/20 at [facility] [place]), presenting with fatigue and chest pain since Friday. Patient reports severe fatigue starting Friday after his first treatment with Lutathera. Pt also reported mid-left lower chest pain described as pressure. No radiation, no aggravating or alleviating factors. Pt has never felt this type of chest pain before. Denies palpitations, diaphoresis, dyspnea, nausea, vomiting. Pt has diffuse abdominal pain. Also with constipation; last BM several days ago. No other acute complaints.      Problem/Plan - 1:  ·  Problem: Chest Pain.   ·  Plan: Location described as LUQ abdomen with pain elicited with palpation, appears noncardiac   - EKG SR with PACs, no ischemic characteristics  - Trop 72-->83--> 81/ CKMB 1.9  - 10/26 reports CP, trop increased to 110 --> 133 likely 2/2 demand i/s/o ST and fever. Trend to peak.   - Mildly elevated LFTS and Lipase noted  - Hx of recent PCI  - Repeat TTE unchanged   - CP possibly 2/2 lutathera with known S/E of abdominal discomfort       Problem/Plan - 2:  ·  Problem: Shortness of Breath  ·  Plan: ECG non-ischemic  - TTE wnl.  - BNP 2572. Repeat 4171.  - CT neg for PE, no pulm edema noted  - Will restart home torsemide 40mg PO BID with hold parameters. Will cont to hold aldactone and resume as BP and Cr tolerate.     Problem/Plan - 3:  ·  Problem: CAD.   ·  Plan: Recent PCI to pLAD in June 2023  - ECG non-ischemic  - Trop/CKMB as noted above  - c/w ASA, Plavix and statin  - TTE unchanged     Problem/Plan - 4:  ·  Problem: Prophylactic measure.   ·  Plan: dvt ppx: Lovenox.          KIRSTIE Latham [facility]  Cardiovascular Medicine  [address] [place] [address], [address]  Available through call or text on [employer] TE[employer]  Office: [phone]   DATE OF SERVICE: 10-26-23 @ 10:06    Patient is a 78y old  Male who presents with a chief complaint of Chest pain (25 Oct 2023 15:38)      INTERVAL HISTORY: Feeling slightly better this am. Had chest discomfort overnight.     REVIEW OF SYSTEMS:  CONSTITUTIONAL: No weakness  EYES/ENT: No visual changes;  No throat pain   NECK: No pain or stiffness  RESPIRATORY: No cough, wheezing; No shortness of breath  CARDIOVASCULAR: No chest pain or palpitations  GASTROINTESTINAL: No abdominal  pain. No nausea, vomiting, or hematemesis  GENITOURINARY: No dysuria, frequency or hematuria  NEUROLOGICAL: No stroke like symptoms  SKIN: No rashes    TELEMETRY Personally reviewed: SR 80-90 PVC, MAT 130s   	  MEDICATIONS:  metoprolol succinate ER 25 milliGRAM(s) Oral daily        PHYSICAL EXAM:  T(C): 37.8 (10-26-23 @ 08:47), Max: 39.2 (10-26-23 @ 06:39)  HR: 93 (10-26-23 @ 08:47) (84 - 107)  BP: 94/59 (10-26-23 @ 08:47) (94/59 - 117/70)  RR: 18 (10-26-23 @ 08:47) (18 - 18)  SpO2: 97% (10-26-23 @ 08:47) (95% - 97%)  Wt(kg): --  I&O's Summary    25 Oct 2023 07:01  -  26 Oct 2023 07:00  --------------------------------------------------------  IN: 200 mL / OUT: 300 mL / NET: -100 mL          Appearance: In no distress	  HEENT:    PERRL, EOMI	  Cardiovascular:  S1 S2, No JVD  Respiratory: Lungs clear to auscultation	  Gastrointestinal:  Soft, Non-tender, + BS	  Vascularature:  + edema of LE  Psychiatric: Appropriate affect   Neuro: no acute focal deficits                               11.1   12.58 )-----------( 250      ( 26 Oct 2023 07:04 )             33.6     10-26    134<L>  |  102  |  33<H>  ----------------------------<  152<H>  4.3   |  17<L>  |  1.45<H>    Ca    9.3      26 Oct 2023 06:59  Phos  3.7     10-26  Mg     1.9     10-26    TPro  6.5  /  Alb  2.8<L>  /  TBili  0.7  /  DBili  x   /  AST  72<H>  /  ALT  49<H>  /  AlkPhos  134<H>  10-26        Labs personally reviewed      ASSESSMENT/PLAN: 	    78 M with hx of CAD s/p NURIA to distal LAD in 6/2023, HTN, HLD, EtOH disorder, T2DM, spinal surgeries, neuroendocrine tumor on Lutathera (first treatment on Friday 10/20 at [facility] [place] presenting with fatigue and chest pain since Friday. Patient reports severe fatigue starting Friday after his first treatment with Lutathera. Pt also reported mid-left lower chest pain described as pressure. No radiation, no aggravating or alleviating factors. Pt has never felt this type of chest pain before. Denies palpitations, diaphoresis, dyspnea, nausea, vomiting. Pt has diffuse abdominal pain. Also with constipation; last BM several days ago. No other acute complaints.      Problem/Plan - 1:  ·  Problem: Chest Pain.   ·  Plan: Location described as LUQ abdomen with pain elicited with palpation, appears noncardiac   - EKG SR with PACs, no ischemic characteristics  - Trop 72-->83--> 81/ CKMB 1.9  - 10/26 reports CP, trop increased to 110 --> 133 likely 2/2 demand i/s/o ST and fever. Trend to peak.   - Mildly elevated LFTS and Lipase noted  - Hx of recent PCI  - Repeat TTE unchanged   - CP possibly 2/2 lutathera with known S/E of abdominal discomfort       Problem/Plan - 2:  ·  Problem: Shortness of Breath  ·  Plan: ECG non-ischemic  - TTE wnl.  - BNP 2572. Repeat 4171.  - CT neg for PE, no pulm edema noted  - Will restart home torsemide 40mg PO BID with hold parameters. Will cont to hold aldactone and resume as BP and Cr tolerate.     Problem/Plan - 3:  ·  Problem: CAD.   ·  Plan: Recent PCI to pLAD in June 2023  - ECG non-ischemic  - Trop/CKMB as noted above  - c/w ASA, Plavix and statin  - TTE unchanged     Problem/Plan - 4:  ·  Problem: Prophylactic measure.   ·  Plan: dvt ppx: Lovenox.          KIRSTIE Latham [facility]  Cardiovascular Medicine  [address] [place] [address], [address]  Available through call or text on [employer]  Office: [phone]

## 2023-10-26 NOTE — CONSULT NOTE ADULT - PROBLEM SELECTOR RECOMMENDATION 2
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

## 2023-10-26 NOTE — CONSULT NOTE ADULT - ATTENDING COMMENTS
78M with CAD s/p NURIA to distal LAD in 6/2023, HTN, HLD, EtOH disorder, T2DM, spinal surgeries, neuroendocrine tumor on Lutathera (first treatment on Friday 10/20 at [facility] [place] Admitted 10/23/2023 c/o fatigue since Lutahera dose and chest pain since Friday.  Also complains of constipation.  Noted to have mild leukocytosis and fever.  All cultures have been negative.  Elevated LFT.  CT with increased liver metastatic lesions.      Fever  - no clear infection  - would monitor off antibiotics  - f/u repeat BC  - obtain Duplex r/o DVT b/l legs 78M with CAD s/p NURIA to distal LAD in 6/2023, HTN, HLD, EtOH disorder, T2DM, spinal surgeries, neuroendocrine tumor on Lutathera (first [place] [facility] Admitted 10/23/2023 c/o fatigue since Lutahera dose and chest pain since Friday.  Also complains of constipation.  Noted to have mild leukocytosis and fever.  All cultures have been negative.  Elevated LFT.  CT with increased liver metastatic lesions.      Fever  - no clear infection  - would monitor off antibiotics  - f/u repeat BC  - obtain Duplex r/o DVT b/l legs    I have discussed plan of care as detailed above with [place] [id number]

## 2023-10-27 DIAGNOSIS — N17.9 ACUTE KIDNEY FAILURE, UNSPECIFIED: ICD-10-CM

## 2023-10-27 DIAGNOSIS — I48.91 UNSPECIFIED ATRIAL FIBRILLATION: ICD-10-CM

## 2023-10-27 LAB
ALBUMIN SERPL ELPH-MCNC: 2.6 G/DL — LOW (ref 3.3–5)
ALBUMIN SERPL ELPH-MCNC: 2.6 G/DL — LOW (ref 3.3–5)
ALP SERPL-CCNC: 153 U/L — HIGH (ref 40–120)
ALP SERPL-CCNC: 153 U/L — HIGH (ref 40–120)
ALT FLD-CCNC: 43 U/L — SIGNIFICANT CHANGE UP (ref 10–45)
ALT FLD-CCNC: 43 U/L — SIGNIFICANT CHANGE UP (ref 10–45)
ANION GAP SERPL CALC-SCNC: 16 MMOL/L — SIGNIFICANT CHANGE UP (ref 5–17)
ANION GAP SERPL CALC-SCNC: 16 MMOL/L — SIGNIFICANT CHANGE UP (ref 5–17)
AST SERPL-CCNC: 58 U/L — HIGH (ref 10–40)
AST SERPL-CCNC: 58 U/L — HIGH (ref 10–40)
BILIRUB SERPL-MCNC: 0.6 MG/DL — SIGNIFICANT CHANGE UP (ref 0.2–1.2)
BILIRUB SERPL-MCNC: 0.6 MG/DL — SIGNIFICANT CHANGE UP (ref 0.2–1.2)
BUN SERPL-MCNC: 42 MG/DL — HIGH (ref 7–23)
BUN SERPL-MCNC: 42 MG/DL — HIGH (ref 7–23)
CALCIUM SERPL-MCNC: 9.4 MG/DL — SIGNIFICANT CHANGE UP (ref 8.4–10.5)
CALCIUM SERPL-MCNC: 9.4 MG/DL — SIGNIFICANT CHANGE UP (ref 8.4–10.5)
CHLORIDE SERPL-SCNC: 104 MMOL/L — SIGNIFICANT CHANGE UP (ref 96–108)
CHLORIDE SERPL-SCNC: 104 MMOL/L — SIGNIFICANT CHANGE UP (ref 96–108)
CO2 SERPL-SCNC: 16 MMOL/L — LOW (ref 22–31)
CO2 SERPL-SCNC: 16 MMOL/L — LOW (ref 22–31)
CREAT ?TM UR-MCNC: 42 MG/DL — SIGNIFICANT CHANGE UP
CREAT SERPL-MCNC: 1.74 MG/DL — HIGH (ref 0.5–1.3)
CREAT SERPL-MCNC: 1.74 MG/DL — HIGH (ref 0.5–1.3)
EGFR: 40 ML/MIN/1.73M2 — LOW
EGFR: 40 ML/MIN/1.73M2 — LOW
GLUCOSE BLDC GLUCOMTR-MCNC: 117 MG/DL — HIGH (ref 70–99)
GLUCOSE BLDC GLUCOMTR-MCNC: 117 MG/DL — HIGH (ref 70–99)
GLUCOSE BLDC GLUCOMTR-MCNC: 134 MG/DL — HIGH (ref 70–99)
GLUCOSE BLDC GLUCOMTR-MCNC: 134 MG/DL — HIGH (ref 70–99)
GLUCOSE BLDC GLUCOMTR-MCNC: 144 MG/DL — HIGH (ref 70–99)
GLUCOSE BLDC GLUCOMTR-MCNC: 144 MG/DL — HIGH (ref 70–99)
GLUCOSE BLDC GLUCOMTR-MCNC: 149 MG/DL — HIGH (ref 70–99)
GLUCOSE BLDC GLUCOMTR-MCNC: 149 MG/DL — HIGH (ref 70–99)
GLUCOSE SERPL-MCNC: 118 MG/DL — HIGH (ref 70–99)
GLUCOSE SERPL-MCNC: 118 MG/DL — HIGH (ref 70–99)
HCT VFR BLD CALC: 33.9 % — LOW (ref 39–50)
HCT VFR BLD CALC: 33.9 % — LOW (ref 39–50)
HGB BLD-MCNC: 11.3 G/DL — LOW (ref 13–17)
HGB BLD-MCNC: 11.3 G/DL — LOW (ref 13–17)
MCHC RBC-ENTMCNC: 30.1 PG — SIGNIFICANT CHANGE UP (ref 27–34)
MCHC RBC-ENTMCNC: 30.1 PG — SIGNIFICANT CHANGE UP (ref 27–34)
MCHC RBC-ENTMCNC: 33.3 GM/DL — SIGNIFICANT CHANGE UP (ref 32–36)
MCHC RBC-ENTMCNC: 33.3 GM/DL — SIGNIFICANT CHANGE UP (ref 32–36)
MCV RBC AUTO: 90.2 FL — SIGNIFICANT CHANGE UP (ref 80–100)
MCV RBC AUTO: 90.2 FL — SIGNIFICANT CHANGE UP (ref 80–100)
NRBC # BLD: 0 /100 WBCS — SIGNIFICANT CHANGE UP (ref 0–0)
NRBC # BLD: 0 /100 WBCS — SIGNIFICANT CHANGE UP (ref 0–0)
PLATELET # BLD AUTO: 274 K/UL — SIGNIFICANT CHANGE UP (ref 150–400)
PLATELET # BLD AUTO: 274 K/UL — SIGNIFICANT CHANGE UP (ref 150–400)
POTASSIUM SERPL-MCNC: 4.6 MMOL/L — SIGNIFICANT CHANGE UP (ref 3.5–5.3)
POTASSIUM SERPL-MCNC: 4.6 MMOL/L — SIGNIFICANT CHANGE UP (ref 3.5–5.3)
POTASSIUM SERPL-SCNC: 4.6 MMOL/L — SIGNIFICANT CHANGE UP (ref 3.5–5.3)
POTASSIUM SERPL-SCNC: 4.6 MMOL/L — SIGNIFICANT CHANGE UP (ref 3.5–5.3)
PROT ?TM UR-MCNC: 16 MG/DL — HIGH (ref 0–12)
PROT ?TM UR-MCNC: 16 MG/DL — HIGH (ref 0–12)
PROT SERPL-MCNC: 6.4 G/DL — SIGNIFICANT CHANGE UP (ref 6–8.3)
PROT SERPL-MCNC: 6.4 G/DL — SIGNIFICANT CHANGE UP (ref 6–8.3)
PROT/CREAT UR-RTO: 0.4 RATIO — HIGH (ref 0–0.2)
PROT/CREAT UR-RTO: 0.4 RATIO — HIGH (ref 0–0.2)
RBC # BLD: 3.76 M/UL — LOW (ref 4.2–5.8)
RBC # BLD: 3.76 M/UL — LOW (ref 4.2–5.8)
RBC # FLD: 14.6 % — HIGH (ref 10.3–14.5)
RBC # FLD: 14.6 % — HIGH (ref 10.3–14.5)
SODIUM SERPL-SCNC: 136 MMOL/L — SIGNIFICANT CHANGE UP (ref 135–145)
SODIUM SERPL-SCNC: 136 MMOL/L — SIGNIFICANT CHANGE UP (ref 135–145)
SODIUM UR-SCNC: 70 MMOL/L — SIGNIFICANT CHANGE UP
SODIUM UR-SCNC: 70 MMOL/L — SIGNIFICANT CHANGE UP
WBC # BLD: 10.96 K/UL — HIGH (ref 3.8–10.5)
WBC # BLD: 10.96 K/UL — HIGH (ref 3.8–10.5)
WBC # FLD AUTO: 10.96 K/UL — HIGH (ref 3.8–10.5)
WBC # FLD AUTO: 10.96 K/UL — HIGH (ref 3.8–10.5)

## 2023-10-27 PROCEDURE — 99233 SBSQ HOSP IP/OBS HIGH 50: CPT

## 2023-10-27 PROCEDURE — 99232 SBSQ HOSP IP/OBS MODERATE 35: CPT

## 2023-10-27 PROCEDURE — 99231 SBSQ HOSP IP/OBS SF/LOW 25: CPT | Mod: GC

## 2023-10-27 RX ORDER — APIXABAN 2.5 MG/1
5 TABLET, FILM COATED ORAL
Refills: 0 | Status: DISCONTINUED | OUTPATIENT
Start: 2023-10-27 | End: 2023-11-04

## 2023-10-27 RX ORDER — METOPROLOL TARTRATE 50 MG
25 TABLET ORAL THREE TIMES A DAY
Refills: 0 | Status: DISCONTINUED | OUTPATIENT
Start: 2023-10-27 | End: 2023-11-01

## 2023-10-27 RX ORDER — METOPROLOL TARTRATE 50 MG
25 TABLET ORAL
Refills: 0 | Status: DISCONTINUED | OUTPATIENT
Start: 2023-10-27 | End: 2023-10-27

## 2023-10-27 RX ORDER — FUROSEMIDE 40 MG
20 TABLET ORAL ONCE
Refills: 0 | Status: COMPLETED | OUTPATIENT
Start: 2023-10-27 | End: 2023-10-27

## 2023-10-27 RX ORDER — DIGOXIN 250 MCG
500 TABLET ORAL ONCE
Refills: 0 | Status: COMPLETED | OUTPATIENT
Start: 2023-10-27 | End: 2023-10-27

## 2023-10-27 RX ORDER — SODIUM CHLORIDE 9 MG/ML
500 INJECTION INTRAMUSCULAR; INTRAVENOUS; SUBCUTANEOUS ONCE
Refills: 0 | Status: COMPLETED | OUTPATIENT
Start: 2023-10-27 | End: 2023-10-27

## 2023-10-27 RX ADMIN — Medication 40 MILLIGRAM(S): at 06:01

## 2023-10-27 RX ADMIN — Medication 81 MILLIGRAM(S): at 12:10

## 2023-10-27 RX ADMIN — Medication 25 MILLIGRAM(S): at 13:04

## 2023-10-27 RX ADMIN — CLOPIDOGREL BISULFATE 75 MILLIGRAM(S): 75 TABLET, FILM COATED ORAL at 12:10

## 2023-10-27 RX ADMIN — SENNA PLUS 2 TABLET(S): 8.6 TABLET ORAL at 21:37

## 2023-10-27 RX ADMIN — ENOXAPARIN SODIUM 40 MILLIGRAM(S): 100 INJECTION SUBCUTANEOUS at 06:01

## 2023-10-27 RX ADMIN — Medication 20 MILLIGRAM(S): at 13:04

## 2023-10-27 RX ADMIN — Medication 25 MILLIGRAM(S): at 22:49

## 2023-10-27 RX ADMIN — CHLORHEXIDINE GLUCONATE 1 APPLICATION(S): 213 SOLUTION TOPICAL at 12:12

## 2023-10-27 RX ADMIN — SODIUM CHLORIDE 500 MILLILITER(S): 9 INJECTION INTRAMUSCULAR; INTRAVENOUS; SUBCUTANEOUS at 15:36

## 2023-10-27 RX ADMIN — Medication 500 MICROGRAM(S): at 21:37

## 2023-10-27 RX ADMIN — ATORVASTATIN CALCIUM 80 MILLIGRAM(S): 80 TABLET, FILM COATED ORAL at 21:37

## 2023-10-27 RX ADMIN — Medication 200 MILLIGRAM(S): at 12:10

## 2023-10-27 RX ADMIN — Medication 25 MILLIGRAM(S): at 06:01

## 2023-10-27 RX ADMIN — APIXABAN 5 MILLIGRAM(S): 2.5 TABLET, FILM COATED ORAL at 17:13

## 2023-10-27 RX ADMIN — PANTOPRAZOLE SODIUM 40 MILLIGRAM(S): 20 TABLET, DELAYED RELEASE ORAL at 06:03

## 2023-10-27 NOTE — PROGRESS NOTE ADULT - SUBJECTIVE AND OBJECTIVE BOX
DATE OF SERVICE: 10-27-23 @ 12:16    Patient is a 78y old  Male who presents with a chief complaint of Chest pain (27 Oct 2023 10:47)      INTERVAL HISTORY: Feels ok. No longer having CP.     REVIEW OF SYSTEMS:  CONSTITUTIONAL: No weakness  EYES/ENT: No visual changes;  No throat pain   NECK: No pain or stiffness  RESPIRATORY: No cough, wheezing; No shortness of breath  CARDIOVASCULAR: No chest pain or palpitations  GASTROINTESTINAL: No abdominal  pain. No nausea, vomiting, or hematemesis  GENITOURINARY: No dysuria, frequency or hematuria  NEUROLOGICAL: No stroke like symptoms  SKIN: No rashes    TELEMETRY Personally reviewed: -140  	  MEDICATIONS:  metoprolol succinate ER 25 milliGRAM(s) Oral daily  torsemide 40 milliGRAM(s) Oral two times a day        PHYSICAL EXAM:  T(C): 36.7 (10-27-23 @ 10:08), Max: 36.7 (10-26-23 @ 16:09)  HR: 116 (10-27-23 @ 10:08) (105 - 138)  BP: 110/68 (10-27-23 @ 10:08) (105/67 - 118/66)  RR: 18 (10-27-23 @ 10:08) (18 - 18)  SpO2: 97% (10-27-23 @ 10:08) (94% - 97%)  Wt(kg): --  I&O's Summary    26 Oct 2023 07:01  -  27 Oct 2023 07:00  --------------------------------------------------------  IN: 0 mL / OUT: 1000 mL / NET: -1000 mL          Appearance: In no distress	  HEENT:    PERRL, EOMI	  Cardiovascular:  S1 S2, No JVD  Respiratory: Lungs clear to auscultation	  Gastrointestinal:  Soft, Non-tender, + BS	  Vascularature:  + edema of LE  Psychiatric: Appropriate affect   Neuro: no acute focal deficits                               11.3   10.96 )-----------( 274      ( 27 Oct 2023 06:57 )             33.9     10-27    136  |  104  |  42<H>  ----------------------------<  118<H>  4.6   |  16<L>  |  1.74<H>    Ca    9.4      27 Oct 2023 06:57  Phos  3.7     10-26  Mg     1.9     10-26    TPro  6.4  /  Alb  2.6<L>  /  TBili  0.6  /  DBili  x   /  AST  58<H>  /  ALT  43  /  AlkPhos  153<H>  10-27        Labs personally reviewed      ASSESSMENT/PLAN: 	    78 M with hx of CAD s/p NURIA to distal LAD in 6/2023, HTN, HLD, EtOH disorder, T2DM, spinal surgeries, neuroendocrine tumor on Lutathera (first treatment on Friday 10/20 at [facility]), presenting with fatigue and chest pain since Friday. Patient reports severe fatigue starting Friday after his first treatment with Lutathera. Pt also reported mid-left lower chest pain described as pressure. No radiation, no aggravating or alleviating factors. Pt has never felt this type of chest pain before. Denies palpitations, diaphoresis, dyspnea, nausea, vomiting. Pt has diffuse abdominal pain. Also with constipation; last BM several days ago. No other acute complaints.      Problem/Plan - 1:  ·  Problem: Chest Pain.   ·  Plan: Location described as LUQ abdomen with pain elicited with palpation, appears noncardiac   - EKG SR with PACs, no ischemic characteristics  - Trop 72-->83--> 81/ CKMB 1.9  - 10/26 reports CP, trop increased to 110 --> 133 likely 2/2 demand i/s/o ST and fever. Trend to peak.   - Mildly elevated LFTS and Lipase noted  - Hx of recent PCI  - Repeat TTE unchanged   - CP possibly 2/2 lutathera with known S/E of abdominal discomfort  - 10/27 CP improved with re-implementation of PO diuretics     Problem/Plan - 2:  ·  Problem: Shortness of Breath  ·  Plan: ECG non-ischemic  - TTE wnl.  - BNP 2572. Repeat 4171.  - CT neg for PE, no pulm edema noted  -Cont torsemide 40mg PO BID with hold parameters. Will cont to hold aldactone and resume as BP and Cr tolerate.     Problem/Plan - 3:  ·  Problem: CAD.   ·  Plan: Recent PCI to pLAD in June 2023  - ECG non-ischemic  - Trop/CKMB as noted above  - c/w ASA, Plavix and statin  - TTE unchanged     Problem/Plan - 4:  ·  Problem: Prophylactic measure.   ·  Plan: dvt ppx: Lovenox.     Problem/Plan - 5:  ·  Problem: New Onset Atrial Fibrillation  - As per tele, converted to rapid AF rates 120-140bpm  - Check TSH  - Will increase Toprol to 25mg PO BID  - [id number] of 2. Recommend AC.  - Lovenox increased to 50mg SQ BID.         Sulma Espinosa, ROSIO-NP   Hank Hayes DO [facility]  Cardiovascular Medicine  [address] [place] [address], [address]  Available through call or text on [employer] TEAM[employer]  Office: [phone]   DATE OF SERVICE: 10-27-23 @ 12:16    Patient is a 78y old  Male who presents with a chief complaint of Chest pain (27 Oct 2023 10:47)      INTERVAL HISTORY: Feels ok. No longer having CP.     REVIEW OF SYSTEMS:  CONSTITUTIONAL: No weakness  EYES/ENT: No visual changes;  No throat pain   NECK: No pain or stiffness  RESPIRATORY: No cough, wheezing; No shortness of breath  CARDIOVASCULAR: No chest pain or palpitations  GASTROINTESTINAL: No abdominal  pain. No nausea, vomiting, or hematemesis  GENITOURINARY: No dysuria, frequency or hematuria  NEUROLOGICAL: No stroke like symptoms  SKIN: No rashes    TELEMETRY Personally reviewed: -140  	  MEDICATIONS:  metoprolol succinate ER 25 milliGRAM(s) Oral daily  torsemide 40 milliGRAM(s) Oral two times a day        PHYSICAL EXAM:  T(C): 36.7 (10-27-23 @ 10:08), Max: 36.7 (10-26-23 @ 16:09)  HR: 116 (10-27-23 @ 10:08) (105 - 138)  BP: 110/68 (10-27-23 @ 10:08) (105/67 - 118/66)  RR: 18 (10-27-23 @ 10:08) (18 - 18)  SpO2: 97% (10-27-23 @ 10:08) (94% - 97%)  Wt(kg): --  I&O's Summary    26 Oct 2023 07:01  -  27 Oct 2023 07:00  --------------------------------------------------------  IN: 0 mL / OUT: 1000 mL / NET: -1000 mL          Appearance: In no distress	  HEENT:    PERRL, EOMI	  Cardiovascular:  S1 S2, No JVD  Respiratory: Lungs clear to auscultation	  Gastrointestinal:  Soft, Non-tender, + BS	  Vascularature:  + edema of LE  Psychiatric: Appropriate affect   Neuro: no acute focal deficits                               11.3   10.96 )-----------( 274      ( 27 Oct 2023 06:57 )             33.9     10-27    136  |  104  |  42<H>  ----------------------------<  118<H>  4.6   |  16<L>  |  1.74<H>    Ca    9.4      27 Oct 2023 06:57  Phos  3.7     10-26  Mg     1.9     10-26    TPro  6.4  /  Alb  2.6<L>  /  TBili  0.6  /  DBili  x   /  AST  58<H>  /  ALT  43  /  AlkPhos  153<H>  10-27        Labs personally reviewed      ASSESSMENT/PLAN: 	    78 M with hx of CAD s/p NURIA to distal LAD in 6/2023, HTN, HLD, EtOH disorder, T2DM, spinal surgeries, neuroendocrine tumor on Lutathera (first treatment on Friday 10/20 at [facility] [place]), presenting with fatigue and chest pain since Friday. Patient reports severe fatigue starting Friday after his first treatment with Lutathera. Pt also reported mid-left lower chest pain described as pressure. No radiation, no aggravating or alleviating factors. Pt has never felt this type of chest pain before. Denies palpitations, diaphoresis, dyspnea, nausea, vomiting. Pt has diffuse abdominal pain. Also with constipation; last BM several days ago. No other acute complaints.      Problem/Plan - 1:  ·  Problem: Chest Pain.   ·  Plan: Location described as LUQ abdomen with pain elicited with palpation, appears noncardiac   - EKG SR with PACs, no ischemic characteristics  - Trop 72-->83--> 81/ CKMB 1.9  - 10/26 reports CP, trop increased to 110 --> 133 likely 2/2 demand i/s/o ST and fever. Trend to peak.   - Mildly elevated LFTS and Lipase noted  - Hx of recent PCI  - Repeat TTE unchanged   - CP possibly 2/2 lutathera with known S/E of abdominal discomfort  - 10/27 CP improved with re-implementation of PO diuretics     Problem/Plan - 2:  ·  Problem: Shortness of Breath  ·  Plan: ECG non-ischemic  - TTE wnl.  - BNP 2572. Repeat 4171.  - CT neg for PE, no pulm edema noted  -Cont torsemide 40mg PO BID with hold parameters. Will cont to hold aldactone and resume as BP and Cr tolerate.  - Will give additional dose of lasix 20mg IVP once.      Problem/Plan - 3:  ·  Problem: CAD.   ·  Plan: Recent PCI to pLAD in June 2023  - ECG non-ischemic  - Trop/CKMB as noted above  - c/w ASA, Plavix and statin  - TTE unchanged     Problem/Plan - 4:  ·  Problem: Prophylactic measure.   ·  Plan: dvt ppx: Lovenox.     Problem/Plan - 5:  ·  Problem: New Onset Atrial Fibrillation  - As per tele, converted to rapid AF rates 120-140bpm  - Check TSH  - Will increase Toprol to 25mg PO BID  - [id number] of 2. Recommend AC.  - Increase Lovenox to therapeutic dose BID        ROSIO Latham-NP   Hank Hayes DO [facility]  Cardiovascular Medicine  [address] [place] [address], [address]  Available through call or text on [employer] TEAM[employer]  Office: [phone]   DATE OF SERVICE: 10-27-23 @ 12:16    Patient is a 78y old  Male who presents with a chief complaint of Chest pain (27 Oct 2023 10:47)      INTERVAL HISTORY: Feels ok. No longer having CP.     REVIEW OF SYSTEMS:  CONSTITUTIONAL: No weakness  EYES/ENT: No visual changes;  No throat pain   NECK: No pain or stiffness  RESPIRATORY: No cough, wheezing; No shortness of breath  CARDIOVASCULAR: No chest pain or palpitations  GASTROINTESTINAL: No abdominal  pain. No nausea, vomiting, or hematemesis  GENITOURINARY: No dysuria, frequency or hematuria  NEUROLOGICAL: No stroke like symptoms  SKIN: No rashes    TELEMETRY Personally reviewed: -140  	  MEDICATIONS:  metoprolol succinate ER 25 milliGRAM(s) Oral daily  torsemide 40 milliGRAM(s) Oral two times a day        PHYSICAL EXAM:  T(C): 36.7 (10-27-23 @ 10:08), Max: 36.7 (10-26-23 @ 16:09)  HR: 116 (10-27-23 @ 10:08) (105 - 138)  BP: 110/68 (10-27-23 @ 10:08) (105/67 - 118/66)  RR: 18 (10-27-23 @ 10:08) (18 - 18)  SpO2: 97% (10-27-23 @ 10:08) (94% - 97%)  Wt(kg): --  I&O's Summary    26 Oct 2023 07:01  -  27 Oct 2023 07:00  --------------------------------------------------------  IN: 0 mL / OUT: 1000 mL / NET: -1000 mL          Appearance: In no distress	  HEENT:    PERRL, EOMI	  Cardiovascular:  S1 S2, No JVD  Respiratory: Lungs clear to auscultation	  Gastrointestinal:  Soft, Non-tender, + BS	  Vascularature:  + edema of LE  Psychiatric: Appropriate affect   Neuro: no acute focal deficits                               11.3   10.96 )-----------( 274      ( 27 Oct 2023 06:57 )             33.9     10-27    136  |  104  |  42<H>  ----------------------------<  118<H>  4.6   |  16<L>  |  1.74<H>    Ca    9.4      27 Oct 2023 06:57  Phos  3.7     10-26  Mg     1.9     10-26    TPro  6.4  /  Alb  2.6<L>  /  TBili  0.6  /  DBili  x   /  AST  58<H>  /  ALT  43  /  AlkPhos  153<H>  10-27        Labs personally reviewed      ASSESSMENT/PLAN: 	    78 M with hx of CAD s/p NURIA to distal LAD in 6/2023, HTN, HLD, EtOH disorder, T2DM, spinal surgeries, neuroendocrine tumor on Lutathera (first treatment on Friday 10/20 at [facility] [place]), presenting with fatigue and chest pain since Friday. Patient reports severe fatigue starting Friday after his first treatment with Lutathera. Pt also reported mid-left lower chest pain described as pressure. No radiation, no aggravating or alleviating factors. Pt has never felt this type of chest pain before. Denies palpitations, diaphoresis, dyspnea, nausea, vomiting. Pt has diffuse abdominal pain. Also with constipation; last BM several days ago. No other acute complaints.      Problem/Plan - 1:  ·  Problem: Chest Pain.   ·  Plan: Location described as LUQ abdomen with pain elicited with palpation, appears noncardiac   - EKG SR with PACs, no ischemic characteristics  - Trop 72-->83--> 81/ CKMB 1.9  - 10/26 reports CP, trop increased to 110 --> 133 likely 2/2 demand i/s/o ST and fever. Trend to peak.   - Mildly elevated LFTS and Lipase noted  - Hx of recent PCI  - Repeat TTE unchanged   - CP possibly 2/2 lutathera with known S/E of abdominal discomfort  - 10/27 CP improved with re-implementation of PO diuretics     Problem/Plan - 2:  ·  Problem: Shortness of Breath  ·  Plan: ECG non-ischemic  - TTE wnl.  - BNP 2572. Repeat 4171.  - CT neg for PE, no pulm edema noted  - s/p Lasix 20 IVP once. Will hold further diuretics given RAZIA.      Problem/Plan - 3:  ·  Problem: CAD.   ·  Plan: Recent PCI to pLAD in June 2023  - ECG non-ischemic  - Trop/CKMB as noted above  - c/w ASA, Plavix and statin  - TTE unchanged     Problem/Plan - 4:  ·  Problem: Prophylactic measure.   ·  Plan: dvt ppx: Lovenox.     Problem/Plan - 5:  ·  Problem: New Onset Atrial Fibrillation  - As per tele, converted to rapid AF rates 120-140bpm  - Check TSH  - Will increase Toprol to 25mg PO BID  - [id number] of 2. Recommend AC.  - Increase Lovenox to therapeutic dose BID        ROSIO Latham-ESTEFANI Hayes DO [facility]  Cardiovascular Medicine  [address] [place] [address], [address]  Available through call or text on [employer] TEAM[employer]  Office: [phone]   DATE OF SERVICE: 10-27-23 @ 12:16    Patient is a 78y old  Male who presents with a chief complaint of Chest pain (27 Oct 2023 10:47)      INTERVAL HISTORY: Feels ok. No longer having CP.     REVIEW OF SYSTEMS:  CONSTITUTIONAL: No weakness  EYES/ENT: No visual changes;  No throat pain   NECK: No pain or stiffness  RESPIRATORY: No cough, wheezing; No shortness of breath  CARDIOVASCULAR: No chest pain or palpitations  GASTROINTESTINAL: No abdominal  pain. No nausea, vomiting, or hematemesis  GENITOURINARY: No dysuria, frequency or hematuria  NEUROLOGICAL: No stroke like symptoms  SKIN: No rashes    TELEMETRY Personally reviewed: -140  	  MEDICATIONS:  metoprolol succinate ER 25 milliGRAM(s) Oral daily  torsemide 40 milliGRAM(s) Oral two times a day        PHYSICAL EXAM:  T(C): 36.7 (10-27-23 @ 10:08), Max: 36.7 (10-26-23 @ 16:09)  HR: 116 (10-27-23 @ 10:08) (105 - 138)  BP: 110/68 (10-27-23 @ 10:08) (105/67 - 118/66)  RR: 18 (10-27-23 @ 10:08) (18 - 18)  SpO2: 97% (10-27-23 @ 10:08) (94% - 97%)  Wt(kg): --  I&O's Summary    26 Oct 2023 07:01  -  27 Oct 2023 07:00  --------------------------------------------------------  IN: 0 mL / OUT: 1000 mL / NET: -1000 mL          Appearance: In no distress	  HEENT:    PERRL, EOMI	  Cardiovascular:  S1 S2, No JVD  Respiratory: Lungs clear to auscultation	  Gastrointestinal:  Soft, Non-tender, + BS	  Vascularature:  + edema of LE  Psychiatric: Appropriate affect   Neuro: no acute focal deficits                               11.3   10.96 )-----------( 274      ( 27 Oct 2023 06:57 )             33.9     10-27    136  |  104  |  42<H>  ----------------------------<  118<H>  4.6   |  16<L>  |  1.74<H>    Ca    9.4      27 Oct 2023 06:57  Phos  3.7     10-26  Mg     1.9     10-26    TPro  6.4  /  Alb  2.6<L>  /  TBili  0.6  /  DBili  x   /  AST  58<H>  /  ALT  43  /  AlkPhos  153<H>  10-27        Labs personally reviewed      ASSESSMENT/PLAN: 	  78 M with hx of CAD s/p NURIA to distal LAD in 6/2023, HTN, HLD, EtOH disorder, T2DM, spinal surgeries, neuroendocrine tumor on Lutathera (first treatment on Friday 10/20 at [facility] [place]), presenting with fatigue and chest pain since Friday. Patient reports severe fatigue starting Friday after his first treatment with Lutathera. Pt also reported mid-left lower chest pain described as pressure. No radiation, no aggravating or alleviating factors. Pt has never felt this type of chest pain before. Denies palpitations, diaphoresis, dyspnea, nausea, vomiting. Pt has diffuse abdominal pain. Also with constipation; last BM several days ago. No other acute complaints.      Problem/Plan - 1:  ·  Problem: Chest Pain.   ·  Plan: Location described as LUQ abdomen with pain elicited with palpation, appears noncardiac   - EKG SR with PACs, no ischemic characteristics  - Trop 72-->83--> 81/ CKMB 1.9  - 10/26 reports CP, trop increased to 110 --> 133 likely 2/2 demand i/s/o ST and fever. Trend to peak.   - Mildly elevated LFTS and Lipase noted  - Hx of recent PCI  - Repeat TTE unchanged   - CP possibly 2/2 lutathera with known S/E of abdominal discomfort  - 10/27 CP improved with re-implementation of PO diuretics     Problem/Plan - 2:  ·  Problem: Shortness of Breath  ·  Plan: ECG non-ischemic  - TTE wnl.  - BNP 2572. Repeat 4171.  - CT neg for PE, no pulm edema noted  - s/p Lasix 20 IVP once. Will hold further diuretics given RAZIA.      Problem/Plan - 3:  ·  Problem: CAD.   ·  Plan: Recent PCI to pLAD in June 2023  - ECG non-ischemic  - Trop/CKMB as noted above  - c/w Plavix and statin, d/c ASA given starting Eliquis  - TTE unchanged     Problem/Plan - 4:  ·  Problem: Prophylactic measure.   ·  Plan: dvt ppx: Lovenox.     Problem/Plan - 5:  ·  Problem: New Onset Atrial Fibrillation  - As per tele, converted to rapid AF rates 120-140bpm  - Check TSH  - Will increase Toprol to 25mg PO BID  - [id number] of 2. Recommend AC.  - Eliquis 5mg BID    Discussed with primary team and OP cards Dr Yoseph Espinosa, AG-NP   Hank Hayes DO [facility]  Cardiovascular Medicine  [address] [place] [address], [address]  Available through call or text on [employer] TE[employer]  Office: [phone]

## 2023-10-27 NOTE — PROGRESS NOTE ADULT - ATTENDING COMMENTS
Palliative consulted for pain- however, patient states pain controlled with non opiate medications. has tylenol available and prn oxy if needed.  reviewed with patient on subsequent visit.    No role identified further for our team. signing off.    [phone]

## 2023-10-27 NOTE — PROGRESS NOTE ADULT - SUBJECTIVE AND OBJECTIVE BOX
Patient is a 78y old  Male who presents with a chief complaint of Chest pain (26 Oct 2023 13:19)    Pt seen and examined at bedside, no acute events.    MEDICATIONS  (STANDING):  allopurinol 200 milliGRAM(s) Oral daily  aspirin enteric coated 81 milliGRAM(s) Oral daily  atorvastatin 80 milliGRAM(s) Oral at bedtime  chlorhexidine 2% Cloths 1 Application(s) Topical daily  clopidogrel Tablet 75 milliGRAM(s) Oral daily  dextrose 5%. 1000 milliLiter(s) (100 mL/Hr) IV Continuous <Continuous>  dextrose 5%. 1000 milliLiter(s) (50 mL/Hr) IV Continuous <Continuous>  dextrose 50% Injectable 25 Gram(s) IV Push once  dextrose 50% Injectable 12.5 Gram(s) IV Push once  dextrose 50% Injectable 25 Gram(s) IV Push once  enoxaparin Injectable 40 milliGRAM(s) SubCutaneous every 24 hours  glucagon  Injectable 1 milliGRAM(s) IntraMuscular once  insulin lispro (ADMELOG) corrective regimen sliding scale   SubCutaneous at bedtime  insulin lispro (ADMELOG) corrective regimen sliding scale   SubCutaneous three times a day before meals  lactated ringers. 1000 milliLiter(s) (500 mL/Hr) IV Continuous <Continuous>  metoprolol succinate ER 25 milliGRAM(s) Oral daily  pantoprazole    Tablet 40 milliGRAM(s) Oral before breakfast  polyethylene glycol 3350 17 Gram(s) Oral daily  senna 2 Tablet(s) Oral at bedtime  torsemide 40 milliGRAM(s) Oral two times a day    MEDICATIONS  (PRN):  acetaminophen     Tablet .. 975 milliGRAM(s) Oral every 6 hours PRN Temp greater or equal to 38C (100.4F), Moderate Pain (4 - 6)  dextrose Oral Gel 15 Gram(s) Oral once PRN Blood Glucose LESS THAN 70 milliGRAM(s)/deciliter  oxyCODONE    IR 5 milliGRAM(s) Oral every 6 hours PRN Severe Pain (7 - 10)    Vital Signs Last 24 Hrs  T(C): 36.7 (27 Oct 2023 10:08), Max: 36.7 (26 Oct 2023 16:09)  T(F): 98 (27 Oct 2023 10:08), Max: 98.1 (27 Oct 2023 05:00)  HR: 116 (27 Oct 2023 10:08) (105 - 138)  BP: 110/68 (27 Oct 2023 10:08) (105/67 - 118/66)  BP(mean): --  RR: 18 (27 Oct 2023 10:08) (18 - 18)  SpO2: 97% (27 Oct 2023 10:08) (94% - 97%)    Parameters below as of 27 Oct 2023 10:08  Patient On (Oxygen Delivery Method): room air        PE  NAD  Awake, alert  Anicteric, MMM  Abd soft, NT, ND  LE edema, pain  No rash grossly  FROM                          11.3   10.96 )-----------( 274      ( 27 Oct 2023 06:57 )             33.9       10-27    136  |  104  |  42<H>  ----------------------------<  118<H>  4.6   |  16<L>  |  1.74<H>    Ca    9.4      27 Oct 2023 06:57  Phos  3.7     10-26  Mg     1.9     10-26    TPro  6.4  /  Alb  2.6<L>  /  TBili  0.6  /  DBili  x   /  AST  58<H>  /  ALT  43  /  AlkPhos  153<H>  10-27

## 2023-10-27 NOTE — PROGRESS NOTE ADULT - SUBJECTIVE AND OBJECTIVE BOX
Indication for Geriatrics and Palliative Care Services/INTERVAL HPI:  SUBJECTIVE AND OBJECTIVE:    OVERNIGHT EVENTS:    DNR on chart:  Allergies    No Known Allergies    Intolerances    MEDICATIONS  (STANDING):  allopurinol 200 milliGRAM(s) Oral daily  aspirin enteric coated 81 milliGRAM(s) Oral daily  atorvastatin 80 milliGRAM(s) Oral at bedtime  chlorhexidine 2% Cloths 1 Application(s) Topical daily  clopidogrel Tablet 75 milliGRAM(s) Oral daily  dextrose 5%. 1000 milliLiter(s) (100 mL/Hr) IV Continuous <Continuous>  dextrose 5%. 1000 milliLiter(s) (50 mL/Hr) IV Continuous <Continuous>  dextrose 50% Injectable 25 Gram(s) IV Push once  dextrose 50% Injectable 12.5 Gram(s) IV Push once  dextrose 50% Injectable 25 Gram(s) IV Push once  enoxaparin Injectable 40 milliGRAM(s) SubCutaneous every 24 hours  glucagon  Injectable 1 milliGRAM(s) IntraMuscular once  insulin lispro (ADMELOG) corrective regimen sliding scale   SubCutaneous three times a day before meals  insulin lispro (ADMELOG) corrective regimen sliding scale   SubCutaneous at bedtime  lactated ringers. 1000 milliLiter(s) (500 mL/Hr) IV Continuous <Continuous>  metoprolol succinate ER 25 milliGRAM(s) Oral daily  pantoprazole    Tablet 40 milliGRAM(s) Oral before breakfast  polyethylene glycol 3350 17 Gram(s) Oral daily  senna 2 Tablet(s) Oral at bedtime  torsemide 40 milliGRAM(s) Oral two times a day    MEDICATIONS  (PRN):  acetaminophen     Tablet .. 975 milliGRAM(s) Oral every 6 hours PRN Temp greater or equal to 38C (100.4F), Moderate Pain (4 - 6)  dextrose Oral Gel 15 Gram(s) Oral once PRN Blood Glucose LESS THAN 70 milliGRAM(s)/deciliter  oxyCODONE    IR 5 milliGRAM(s) Oral every 6 hours PRN Severe Pain (7 - 10)      ITEMS UNCHECKED ARE NOT PRESENT    PRESENT SYMPTOMS: [ ]Unable to self-report - see [ ] CPOT [ ] PAINADS [ ] RDOS  Source if other than patient:  [ ]Family   [ ]Team     Pain:  [ ]yes [ ]no  QOL impact -   Location -                    Aggravating factors -  Quality -  Radiation -  Timing-  Severity (0-10 scale):  Minimal acceptable level (0-10 scale):     CPOT:    https://www.[facility].org/getattachment/[email or web address]/Critical-Care-Pain-Observation-Tool-(CPOT)    Dyspnea:                           [ ]Mild [ ]Moderate [ ]Severe  Anxiety:                             [ ]Mild [ ]Moderate [ ]Severe  Fatigue:                             [ ]Mild [ ]Moderate [ ]Severe  Nausea:                             [ ]Mild [ ]Moderate [ ]Severe  Loss of appetite:              [ ]Mild [ ]Moderate [ ]Severe  Constipation:                    [ ]Mild [ ]Moderate [ ]Severe    PCSSQ[Palliative Care Spiritual Screening Question]   Severity (0-10):  Score of 4 or > indicate consideration of Chaplaincy referral.  Chaplaincy Referral: [ ] yes [ ] refused [ ] following [ ] Deferred     Caregiver [place]? : [ ] yes [ ] no [ ] Deferred [ ] Declined             Social work referral [ ] Patient & Family Centered Care Referral [ ]     Anticipatory Grief present?:  [ ] yes [ ] no  [ ] Deferred                  Social work referral [ ] Chaplaincy Referral[ ]      Other Symptoms:  [ ]All other review of systems negative   [ ]Unable to obtain due to poor mentation    Palliative Performance Status Version 2:         %      http://npcrc.org/files/news/palliative_performance_scale_ppsv2.pdf  PHYSICAL EXAM:  Vital Signs Last 24 Hrs  T(C): 36.7 (27 Oct 2023 10:08), Max: 36.7 (26 Oct 2023 16:09)  T(F): 98 (27 Oct 2023 10:08), Max: 98.1 (27 Oct 2023 05:00)  HR: 116 (27 Oct 2023 10:08) (105 - 138)  BP: 110/68 (27 Oct 2023 10:08) (105/67 - 118/66)  BP(mean): --  RR: 18 (27 Oct 2023 10:08) (18 - 18)  SpO2: 97% (27 Oct 2023 10:08) (94% - 97%)    Parameters below as of 27 Oct 2023 10:08  Patient On (Oxygen Delivery Method): room air     I&O's Summary    26 Oct 2023 07:01  -  27 Oct 2023 07:00  --------------------------------------------------------  IN: 0 mL / OUT: 1000 mL / NET: -1000 mL       GENERAL: [ ]Cachexia    [ ]Alert  [ ]Oriented x   [ ]Lethargic  [ ]Unarousable  [ ]Verbal  [ ]Non-Verbal  Behavioral:   [ ]Anxiety  [ ]Delirium [ ]Agitation [ ]Other  HEENT:  [ ]Normal   [ ]Dry mouth   [ ]ET Tube/Trach  [ ]Oral lesions  PULMONARY:   [ ]Clear [ ]Tachypnea  [ ]Audible excessive secretions   [ ]Rhonchi        [ ]Right [ ]Left [ ]Bilateral  [ ]Crackles        [ ]Right [ ]Left [ ]Bilateral  [ ]Wheezing     [ ]Right [ ]Left [ ]Bilateral  [ ]Diminished BS [ ] Right [ ]Left [ ]Bilateral  CARDIOVASCULAR:    [ ]Regular [ ]Irregular [ ]Tachy  [ ]Lavelle [ ]Murmur [ ]Other  GASTROINTESTINAL:  [ ]Soft  [ ]Distended   [ ]+BS  [ ]Non tender [ ]Tender  [ ]Other [ ]PEG [ ]OGT/ NGT   Last BM:   GENITOURINARY:  [ ]Normal [ ]Incontinent   [ ]Oliguria/Anuria   [ ]Ivory  MUSCULOSKELETAL:   [ ]Normal   [ ]Weakness  [ ]Bed/Wheelchair bound [ ]Edema  NEUROLOGIC:   [ ]No focal deficits  [ ] Cognitive impairment  [ ] Dysphagia [ ]Dysarthria [ ] Paresis [ ]Other   SKIN:   [ ]Normal  [ ]Rash  [ ]Other  [ ]Pressure ulcer(s) [ ]y [ ]n present on admission    CRITICAL CARE:  [ ]Shock Present  [ ]Septic [ ]Cardiogenic [ ]Neurologic [ ]Hypovolemic  [ ]Vasopressors [ ]Inotropes  [ ]Respiratory failure present [ ]Mechanical Ventilation [ ]Non-invasive ventilatory support [ ]High-Flow   [ ]Acute  [ ]Chronic [ ]Hypoxic  [ ]Hypercarbic [ ]Other  [ ]Other organ failure     LABS:                        11.3   10.96 )-----------( 274      ( 27 Oct 2023 06:57 )             33.9   10-27    136  |  104  |  42<H>  ----------------------------<  118<H>  4.6   |  16<L>  |  1.74<H>    Ca    9.4      27 Oct 2023 06:57  Phos  3.7     10-26  Mg     1.9     10-26    TPro  6.4  /  Alb  2.6<L>  /  TBili  0.6  /  DBili  x   /  AST  58<H>  /  ALT  43  /  AlkPhos  153<H>  10-27      Urinalysis Basic - ( 27 Oct 2023 06:57 )    Color: x / Appearance: x / SG: x / pH: x  Gluc: 118 mg/dL / Ketone: x  / Bili: x / Urobili: x   Blood: x / Protein: x / Nitrite: x   Leuk Esterase: x / RBC: x / WBC x   Sq Epi: x / Non Sq Epi: x / Bacteria: x      RADIOLOGY & ADDITIONAL STUDIES:    Protein Calorie Malnutrition Present: [ ]mild [ ]moderate [ ]severe [ ]underweight [ ]morbid obesity  https://www.andeal.org/vault/2440/web/files/ONC/Table_Clinical%20Characteristics%20to%20Document%20Malnutrition-White%20JV%20et%20al%202012.pdf    Height (cm): 182.9 (10-23-23 @ 16:06), 182.9 (06-23-23 @ 08:24)  Weight (kg): 120.2 (10-23-23 @ 16:06), 124.697 (09-08-23 @ 13:00), 124.693 (06-23-23 @ 08:24)  BMI (kg/m2): 35.9 (10-23-23 @ 16:06), 37.3 (09-08-23 @ 13:00), 37.3 (06-23-23 @ 08:24)    [ ]PPSV2 < or = 30%  [ ]significant weight loss [ ]poor nutritional intake [ ]anasarca[ ]Artificial Nutrition    Other REFERRALS:  [ ]Hospice  [ ]Child Life  [ ]Social Work  [ ]Case management [ ]Holistic Therapy     Goals of Care Document:EMMA Oliver (10-12-21 @ 13:00)  Goals of Care Conversation:   Participants:  · Participants  Patient    Advance Directives:  · Does patient have Advance Directive  No  · Do you want to complete the HCP and name a [facility] Care Agent  Yes  pending    Conversation Discussion:  · Conversation  MOLST Discussed  · Conversation Details  Pt was seen for goals of care conversation. Pt was provided the video code BHJN and MOLST form for completion.  pt was very receptive and was happy that this service was provided.    What Matters Most To Patient and Family:  · What matters most to patient and family  Remain full code, wants to return to his home and personal life.    Personal Advance Directives Treatment Guidelines:   Treatment Guidelines:  · Decision Maker  Patient  · Treatment Guideline Comments  full code    Location of Discussion:   Duration of Advanced Care Planning Meeting:  · Time spent (in minutes)  6    Location of Discussion:  · Location of discussion  Face to face      Electronic Signatures:  La Oliver (RN)  (Signed 12-Oct-2021 18:17)  	Authored: Goals of Care Conversation, Personal Advance Directives Treatment Guidelines, Location of Discussion      Last Updated: 12-Oct-2021 18:17 by La Oliver (RN)     Indication for Geriatrics and Palliative Care Services/INTERVAL HPI:  SUBJECTIVE AND OBJECTIVE: Patient is a 78 year old male with a history of CAD (s/p stent 06/2023), HTN, HLD, EtOH disorder, T2DM, spinal surgeries, and neuroendocrine tumor on Lutathera (first treatment on 10/[facility]) who presented with fatigue and non-radiating mid left lower chest pain following his first Lutathera treatment.    OVERNIGHT EVENTS: Patient's creatinine is uptrending. Feels his pain is improving and endorses that he does not need analgesic medications or alterations to his medication regimen at this time.     DNR on chart:  Allergies    No Known Allergies    Intolerances    MEDICATIONS  (STANDING):  allopurinol 200 milliGRAM(s) Oral daily  aspirin enteric coated 81 milliGRAM(s) Oral daily  atorvastatin 80 milliGRAM(s) Oral at bedtime  chlorhexidine 2% Cloths 1 Application(s) Topical daily  clopidogrel Tablet 75 milliGRAM(s) Oral daily  dextrose 5%. 1000 milliLiter(s) (100 mL/Hr) IV Continuous <Continuous>  dextrose 5%. 1000 milliLiter(s) (50 mL/Hr) IV Continuous <Continuous>  dextrose 50% Injectable 25 Gram(s) IV Push once  dextrose 50% Injectable 12.5 Gram(s) IV Push once  dextrose 50% Injectable 25 Gram(s) IV Push once  enoxaparin Injectable 40 milliGRAM(s) SubCutaneous every 24 hours  glucagon  Injectable 1 milliGRAM(s) IntraMuscular once  insulin lispro (ADMELOG) corrective regimen sliding scale   SubCutaneous three times a day before meals  insulin lispro (ADMELOG) corrective regimen sliding scale   SubCutaneous at bedtime  lactated ringers. 1000 milliLiter(s) (500 mL/Hr) IV Continuous <Continuous>  metoprolol succinate ER 25 milliGRAM(s) Oral daily  pantoprazole    Tablet 40 milliGRAM(s) Oral before breakfast  polyethylene glycol 3350 17 Gram(s) Oral daily  senna 2 Tablet(s) Oral at bedtime  torsemide 40 milliGRAM(s) Oral two times a day    MEDICATIONS  (PRN):  acetaminophen     Tablet .. 975 milliGRAM(s) Oral every 6 hours PRN Temp greater or equal to 38C (100.4F), Moderate Pain (4 - 6)  dextrose Oral Gel 15 Gram(s) Oral once PRN Blood Glucose LESS THAN 70 milliGRAM(s)/deciliter  oxyCODONE    IR 5 milliGRAM(s) Oral every 6 hours PRN Severe Pain (7 - 10)      ITEMS UNCHECKED ARE NOT PRESENT    PRESENT SYMPTOMS: [ ]Unable to self-report - see [ ] CPOT [ ] PAINADS [ ] RDOS  Source if other than patient:  [ ]Family   [ ]Team     Pain:  [x]yes [ ]no  QOL impact - minmal   Location - left lower chest, bilateral lower extremities                   Aggravating factors -  Quality - pulling (chest); achy (BLE)  Radiation - none  Timing- constant  Severity (0-10 scale): 6/10 for both  Minimal acceptable level (0-10 scale): 2/10 for both     CPOT:    https://www.[facility].org/getattachment/[email or web address]/Critical-Care-Pain-Observation-Tool-(CPOT)    Dyspnea:                           [ ]Mild [ ]Moderate [ ]Severe  Anxiety:                             [ ]Mild [ ]Moderate [ ]Severe  Fatigue:                             [ ]Mild [ ]Moderate [ ]Severe  Nausea:                             [ ]Mild [ ]Moderate [ ]Severe  Loss of appetite:              [ ]Mild [ ]Moderate [ ]Severe  Constipation:                    [ ]Mild [ ]Moderate [ ]Severe    PCSSQ[Palliative Care Spiritual Screening Question]   Severity (0-10):  Score of 4 or > indicate consideration of Chaplaincy referral.  Chaplaincy Referral: [ ] yes [x] refused [ ] following [ ] Deferred     Caregiver [place]? : [ ] yes [ ] no [ ] Deferred [ ] Declined             Social work referral [ ] Patient & Family Centered Care Referral [ ]     Anticipatory Grief present?:  [ ] yes [ ] no  [ ] Deferred                  Social work referral [ ] Chaplaincy Referral[ ]      Other Symptoms:  [ ]All other review of systems negative   [ ]Unable to obtain due to poor mentation    Palliative Performance Status Version 2:         %      http://npcrc.org/files/news/palliative_performance_scale_ppsv2.pdf  PHYSICAL EXAM:  Vital Signs Last 24 Hrs  T(C): 36.7 (27 Oct 2023 10:08), Max: 36.7 (26 Oct 2023 16:09)  T(F): 98 (27 Oct 2023 10:08), Max: 98.1 (27 Oct 2023 05:00)  HR: 116 (27 Oct 2023 10:08) (105 - 138)  BP: 110/68 (27 Oct 2023 10:08) (105/67 - 118/66)  BP(mean): --  RR: 18 (27 Oct 2023 10:08) (18 - 18)  SpO2: 97% (27 Oct 2023 10:08) (94% - 97%)    Parameters below as of 27 Oct 2023 10:08  Patient On (Oxygen Delivery Method): room air     I&O's Summary    26 Oct 2023 07:01  -  27 Oct 2023 07:00  --------------------------------------------------------  IN: 0 mL / OUT: 1000 mL / NET: -1000 mL       GENERAL: [ ]Cachexia    [x]Alert  [x]Oriented    [ ]Lethargic  [ ]Unarousable  [x]Verbal  [ ]Non-Verbal  Behavioral:   [ ]Anxiety  [ ]Delirium [ ]Agitation [ ]Other  HEENT:  [x]Normal   [ ]Dry mouth   [ ]ET Tube/Trach  [ ]Oral lesions  PULMONARY:   [ ]Clear [ ]Tachypnea  [ ]Audible excessive secretions   [ ]Rhonchi        [ ]Right [ ]Left [ ]Bilateral  [ ]Crackles        [ ]Right [ ]Left [ ]Bilateral  [ ]Wheezing     [ ]Right [ ]Left [ ]Bilateral  [ ]Diminished BS [ ] Right [ ]Left [ ]Bilateral  CARDIOVASCULAR:    [ ]Regular [ ]Irregular [ ]Tachy  [ ]Lavelle [ ]Murmur [ ]Other  GASTROINTESTINAL:  [x]Soft  [ ]Distended   [ ]+BS  [ ]Non tender [ ]Tender  [ ]Other [ ]PEG [ ]OGT/ NGT   Last BM:   GENITOURINARY:  [x]Normal [ ]Incontinent   [ ]Oliguria/Anuria   [ ]Ivory  MUSCULOSKELETAL:   [ ]Normal   [ ]Weakness  [ ]Bed/Wheelchair bound [x]Edema  NEUROLOGIC:   [x]No focal deficits  [ ] Cognitive impairment  [ ] Dysphagia [ ]Dysarthria [ ] Paresis [ ]Other   SKIN:   [ ]Normal  [ ]Rash  [ ]Other  [ ]Pressure ulcer(s) [ ]y [ ]n present on admission    CRITICAL CARE:  [ ]Shock Present  [ ]Septic [ ]Cardiogenic [ ]Neurologic [ ]Hypovolemic  [ ]Vasopressors [ ]Inotropes  [ ]Respiratory failure present [ ]Mechanical Ventilation [ ]Non-invasive ventilatory support [ ]High-Flow   [ ]Acute  [ ]Chronic [ ]Hypoxic  [ ]Hypercarbic [ ]Other  [ ]Other organ failure     LABS:                        11.3   10.96 )-----------( 274      ( 27 Oct 2023 06:57 )             33.9   10-27    136  |  104  |  42<H>  ----------------------------<  118<H>  4.6   |  16<L>  |  1.74<H>    Ca    9.4      27 Oct 2023 06:57  Phos  3.7     10-26  Mg     1.9     10-26    TPro  6.4  /  Alb  2.6<L>  /  TBili  0.6  /  DBili  x   /  AST  58<H>  /  ALT  43  /  AlkPhos  153<H>  10-27      Urinalysis Basic - ( 27 Oct 2023 06:57 )    Color: x / Appearance: x / SG: x / pH: x  Gluc: 118 mg/dL / Ketone: x  / Bili: x / Urobili: x   Blood: x / Protein: x / Nitrite: x   Leuk Esterase: x / RBC: x / WBC x   Sq Epi: x / Non Sq Epi: x / Bacteria: x      RADIOLOGY & ADDITIONAL STUDIES:    Protein Calorie Malnutrition Present: [ ]mild [ ]moderate [ ]severe [ ]underweight [ ]morbid obesity  https://www.andeal.org/vault/2440/web/files/ONC/Table_Clinical%20Characteristics%20to%20Document%20Malnutrition-White%20JV%20et%20al%202012.pdf    Height (cm): 182.9 (10-23-23 @ 16:06), 182.9 (06-23-23 @ 08:24)  Weight (kg): 120.2 (10-23-23 @ 16:06), 124.697 (09-08-23 @ 13:00), 124.693 (06-23-23 @ 08:24)  BMI (kg/m2): 35.9 (10-23-23 @ 16:06), 37.3 (09-08-23 @ 13:00), 37.3 (06-23-23 @ 08:24)    [ ]PPSV2 < or = 30%  [ ]significant weight loss [ ]poor nutritional intake [ ]anasarca[ ]Artificial Nutrition    Other REFERRALS:  [ ]Hospice  [ ]Child Life  [ ]Social Work  [ ]Case management [ ]Holistic Therapy     Goals of Care Document:EMMA Oliver (10-12-21 @ 13:00)  Goals of Care Conversation:   Participants:  · Participants  Patient    Advance Directives:  · Does patient have Advance Directive  No  · Do you want to complete the HCP and name a Magn[facility] Care Agent  Yes  pending    Conversation Discussion:  · Conversation  MOLST Discussed  · Conversation Details  Pt was seen for goals of care conversation. Pt was provided the video code BHJN and MOLST form for completion.  pt was very receptive and was happy that this service was provided.    What Matters Most To Patient and Family:  · What matters most to patient and family  Remain full code, wants to return to his home and personal life.    Personal Advance Directives Treatment Guidelines:   Treatment Guidelines:  · Decision Maker  Patient  · Treatment Guideline Comments  full code    Location of Discussion:   Duration of Advanced Care Planning Meeting:  · Time spent (in minutes)  6    Location of Discussion:  · Location of discussion  Face to face      Electronic Signatures:  La Oliver (TAVO)  (Signed 12-Oct-2021 18:17)  	Authored: Goals of Care Conversation, Personal Advance Directives Treatment Guidelines, Location of Discussion      Last Updated: 12-Oct-2021 18:17 by La Oliver)     Indication for Geriatrics and Palliative Care Services/INTERVAL HPI:  SUBJECTIVE AND OBJECTIVE: Patient is a 78 year old male with a history of CAD (s/p stent 06/2023), HTN, HLD, EtOH disorder, T2DM, spinal surgeries, and neuroendocrine tumor on Lutathera (first treatment on 10/20/2023 at [facility]) who presented with fatigue and non-radiating mid left lower chest pain following his first Lutathera treatment.    OVERNIGHT EVENTS: Patient's creatinine is uptrending. Feels his pain is improving and endorses that he does not need analgesic medications or alterations to his medication regimen at this time.     DNR on chart:  Allergies    No Known Allergies    Intolerances    MEDICATIONS  (STANDING):  allopurinol 200 milliGRAM(s) Oral daily  aspirin enteric coated 81 milliGRAM(s) Oral daily  atorvastatin 80 milliGRAM(s) Oral at bedtime  chlorhexidine 2% Cloths 1 Application(s) Topical daily  clopidogrel Tablet 75 milliGRAM(s) Oral daily  dextrose 5%. 1000 milliLiter(s) (100 mL/Hr) IV Continuous <Continuous>  dextrose 5%. 1000 milliLiter(s) (50 mL/Hr) IV Continuous <Continuous>  dextrose 50% Injectable 25 Gram(s) IV Push once  dextrose 50% Injectable 12.5 Gram(s) IV Push once  dextrose 50% Injectable 25 Gram(s) IV Push once  enoxaparin Injectable 40 milliGRAM(s) SubCutaneous every 24 hours  glucagon  Injectable 1 milliGRAM(s) IntraMuscular once  insulin lispro (ADMELOG) corrective regimen sliding scale   SubCutaneous three times a day before meals  insulin lispro (ADMELOG) corrective regimen sliding scale   SubCutaneous at bedtime  lactated ringers. 1000 milliLiter(s) (500 mL/Hr) IV Continuous <Continuous>  metoprolol succinate ER 25 milliGRAM(s) Oral daily  pantoprazole    Tablet 40 milliGRAM(s) Oral before breakfast  polyethylene glycol 3350 17 Gram(s) Oral daily  senna 2 Tablet(s) Oral at bedtime  torsemide 40 milliGRAM(s) Oral two times a day    MEDICATIONS  (PRN):  acetaminophen     Tablet .. 975 milliGRAM(s) Oral every 6 hours PRN Temp greater or equal to 38C (100.4F), Moderate Pain (4 - 6)  dextrose Oral Gel 15 Gram(s) Oral once PRN Blood Glucose LESS THAN 70 milliGRAM(s)/deciliter  oxyCODONE    IR 5 milliGRAM(s) Oral every 6 hours PRN Severe Pain (7 - 10)      ITEMS UNCHECKED ARE NOT PRESENT    PRESENT SYMPTOMS: [ ]Unable to self-report - see [ ] CPOT [ ] PAINADS [ ] RDOS  Source if other than patient:  [ ]Family   [ ]Team     Pain:  [x]yes [ ]no  QOL impact - minmal   Location - left lower chest, bilateral lower extremities                   Aggravating factors -  Quality - pulling (chest); achy (BLE)  Radiation - none  Timing- constant  Severity (0-10 scale): 6/10 for both  Minimal acceptable level (0-10 scale): 2/10 for both     CPOT:    https://www.[facility].org/getattachment/[email or web address]/Critical-Care-Pain-Observation-Tool-(CPOT)    Dyspnea:                           [ ]Mild [ ]Moderate [ ]Severe  Anxiety:                             [ ]Mild [ ]Moderate [ ]Severe  Fatigue:                             [ ]Mild [ ]Moderate [ ]Severe  Nausea:                             [ ]Mild [ ]Moderate [ ]Severe  Loss of appetite:              [ ]Mild [ ]Moderate [ ]Severe  Constipation:                    [ ]Mild [ ]Moderate [ ]Severe    PCSSQ[Palliative Care Spiritual Screening Question]   Severity (0-10):  Score of 4 or > indicate consideration of Chaplaincy referral.  Chaplaincy Referral: [ ] yes [x] refused [ ] following [ ] Deferred     Caregiver [place]? : [ ] yes [ ] no [ ] Deferred [ ] Declined             Social work referral [ ] Patient & Family Centered Care Referral [ ]     Anticipatory Grief present?:  [ ] yes [ ] no  [ ] Deferred                  Social work referral [ ] Chaplaincy Referral[ ]      Other Symptoms:  [x ]All other review of systems negative   [ ]Unable to obtain due to poor mentation    Palliative Performance Status Version 2:        60 %      http://npcrc.org/files/news/palliative_performance_scale_ppsv2.pdf  PHYSICAL EXAM:  Vital Signs Last 24 Hrs  T(C): 36.7 (27 Oct 2023 10:08), Max: 36.7 (26 Oct 2023 16:09)  T(F): 98 (27 Oct 2023 10:08), Max: 98.1 (27 Oct 2023 05:00)  HR: 116 (27 Oct 2023 10:08) (105 - 138)  BP: 110/68 (27 Oct 2023 10:08) (105/67 - 118/66)  BP(mean): --  RR: 18 (27 Oct 2023 10:08) (18 - 18)  SpO2: 97% (27 Oct 2023 10:08) (94% - 97%)    Parameters below as of 27 Oct 2023 10:08  Patient On (Oxygen Delivery Method): room air     I&O's Summary    26 Oct 2023 07:01  -  27 Oct 2023 07:00  --------------------------------------------------------  IN: 0 mL / OUT: 1000 mL / NET: -1000 mL       GENERAL: [ ]Cachexia    [x]Alert  [x]Oriented    [ ]Lethargic  [ ]Unarousable  [x]Verbal  [ ]Non-Verbal  Behavioral:   [ ]Anxiety  [ ]Delirium [ ]Agitation [ ]Other  HEENT:  [x]Normal   [ ]Dry mouth   [ ]ET Tube/Trach  [ ]Oral lesions  PULMONARY:   [ ]Clear [ ]Tachypnea  [ ]Audible excessive secretions   [ ]Rhonchi        [ ]Right [ ]Left [ ]Bilateral  [ ]Crackles        [ ]Right [ ]Left [ ]Bilateral  [ ]Wheezing     [ ]Right [ ]Left [ ]Bilateral  [ ]Diminished BS [ ] Right [ ]Left [ ]Bilateral  CARDIOVASCULAR:    [ ]Regular [ ]Irregular [ ]Tachy  [ ]Lavelle [ ]Murmur [ ]Other  GASTROINTESTINAL:  [x]Soft  [ ]Distended   [ ]+BS  [ ]Non tender [ ]Tender  [ ]Other [ ]PEG [ ]OGT/ NGT   Last BM:   GENITOURINARY:  [x]Normal [ ]Incontinent   [ ]Oliguria/Anuria   [ ]Ivory  MUSCULOSKELETAL:   [ ]Normal   [ ]Weakness  [ ]Bed/Wheelchair bound [x]Edema  NEUROLOGIC:   [x]No focal deficits  [ ] Cognitive impairment  [ ] Dysphagia [ ]Dysarthria [ ] Paresis [ ]Other   SKIN:   [ ]Normal  [ ]Rash  [ ]Other  [ ]Pressure ulcer(s) [ ]y [ ]n present on admission    CRITICAL CARE:  [ ]Shock Present  [ ]Septic [ ]Cardiogenic [ ]Neurologic [ ]Hypovolemic  [ ]Vasopressors [ ]Inotropes  [ ]Respiratory failure present [ ]Mechanical Ventilation [ ]Non-invasive ventilatory support [ ]High-Flow   [ ]Acute  [ ]Chronic [ ]Hypoxic  [ ]Hypercarbic [ ]Other  [ ]Other organ failure     LABS:                        11.3   10.96 )-----------( 274      ( 27 Oct 2023 06:57 )             33.9   10-27    136  |  104  |  42<H>  ----------------------------<  118<H>  4.6   |  16<L>  |  1.74<H>    Ca    9.4      27 Oct 2023 06:57  Phos  3.7     10-26  Mg     1.9     10-26    TPro  6.4  /  Alb  2.6<L>  /  TBili  0.6  /  DBili  x   /  AST  58<H>  /  ALT  43  /  AlkPhos  153<H>  10-27      Urinalysis Basic - ( 27 Oct 2023 06:57 )    Color: x / Appearance: x / SG: x / pH: x  Gluc: 118 mg/dL / Ketone: x  / Bili: x / Urobili: x   Blood: x / Protein: x / Nitrite: x   Leuk Esterase: x / RBC: x / WBC x   Sq Epi: x / Non Sq Epi: x / Bacteria: x      RADIOLOGY & ADDITIONAL STUDIES: recent imaging reviewed    Protein Calorie Malnutrition Present: [ ]mild [ ]moderate [ ]severe [ ]underweight [ ]morbid obesity  https://www.andeal.org/vault/2440/web/files/ONC/Table_Clinical%20Characteristics%20to%20Document%20Malnutrition-White%20JV%20et%20al%202012.pdf    Height (cm): 182.9 (10-23-23 @ 16:06), 182.9 (06-23-23 @ 08:24)  Weight (kg): 120.2 (10-23-23 @ 16:06), 124.697 (09-08-23 @ 13:00), 124.693 (06-23-23 @ 08:24)  BMI (kg/m2): 35.9 (10-23-23 @ 16:06), 37.3 (09-08-23 @ 13:00), 37.3 (06-23-23 @ 08:24)    [ ]PPSV2 < or = 30%  [ ]significant weight loss [ ]poor nutritional intake [ ]anasarca[ ]Artificial Nutrition    Other REFERRALS:  [ ]Hospice  [ ]Child Life  [ ]Social Work  [x ]Case management [ ]Holistic Therapy

## 2023-10-27 NOTE — PROGRESS NOTE ADULT - ASSESSMENT
The patient is a 78y Male complaining of chest pain.    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   --Cardiology following, deferring diuresis for now  --TTE w/ Left ventricular systolic function is normal with an ejection fraction of 64%  --F/u palliative recommendations    leukocytosis, fevers  --2/2 steroids vs infection, pt given dexamethasone 20mg IV prior to treatment w/ Lutathera on 10/20  --ID following, f/u repeat blood cultures  --Now off abx per ID  --Negative LE duplex    anemia  --2/2 malignancy and treatment  --will monitor CBC    will follow and coordinate care w/ [facility]    Hugo Topete PA-C  Hematology/Oncology  [facility]  [phone] (office)

## 2023-10-27 NOTE — PROGRESS NOTE ADULT - ASSESSMENT
Mr. Espinal is a 78 year old male with a history of CAD (s/p stent 06/2023), HTN, HLD, EtOH disorder, T2DM, spinal surgeries, and neuroendocrine tumor on Lutathera (first treatment on 10/20/2023 at [facility] [place]) who presented with fatigue and non-radiating mid left lower chest pain following his first Lutathera treatment.

## 2023-10-27 NOTE — PROGRESS NOTE ADULT - ASSESSMENT
78 M with hx of CAD s/p NURIA to distal LAD in 6/2023, HTN, HLD, EtOH disorder, T2DM, spinal surgeries, neuroendocrine tumor on Lutathera (first treatment on Friday 10/20 at [facility] [place]), presenting with fatigue and chest pain, fever + SIRS criteria. Concern for reaction to Lutathera vs progression of disease given start of symptoms, still with fever

## 2023-10-27 NOTE — PROGRESS NOTE ADULT - PROBLEM SELECTOR PLAN 2
concern for treatment reaction vs progression of disease,     less likely but infectious w/u pending, monitor off abx  onc eval appreciated  pain control- palliative eval, d/w pt he agrees but declined pain meds after discussion    still with fever - f/u w/u  no DVT on doppler

## 2023-10-27 NOTE — PROGRESS NOTE ADULT - PROBLEM SELECTOR PLAN 1
- Discussion held with patient regarding opioid stigma. 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

## 2023-10-27 NOTE — PROGRESS NOTE ADULT - SUBJECTIVE AND OBJECTIVE BOX
[facility] Division of Hospital Medicine  Stevenson Wilson MD  Contact M-F, 8A-5P through [employer] Teams  Other times, contact Hospitalist on call    Patient is a 78y old  Male who presents with a chief complaint of Chest pain (27 Oct 2023 12:23)    SUBJECTIVE / OVERNIGHT EVENTS: no new complaints. ab pain is tolerable, not worsening. noted new AF on monitor  ADDITIONAL REVIEW OF SYSTEMS:    MEDICATIONS  (STANDING):  allopurinol 200 milliGRAM(s) Oral daily  aspirin enteric coated 81 milliGRAM(s) Oral daily  atorvastatin 80 milliGRAM(s) Oral at bedtime  chlorhexidine 2% Cloths 1 Application(s) Topical daily  clopidogrel Tablet 75 milliGRAM(s) Oral daily  dextrose 5%. 1000 milliLiter(s) (50 mL/Hr) IV Continuous <Continuous>  dextrose 5%. 1000 milliLiter(s) (100 mL/Hr) IV Continuous <Continuous>  dextrose 50% Injectable 25 Gram(s) IV Push once  dextrose 50% Injectable 25 Gram(s) IV Push once  dextrose 50% Injectable 12.5 Gram(s) IV Push once  glucagon  Injectable 1 milliGRAM(s) IntraMuscular once  insulin lispro (ADMELOG) corrective regimen sliding scale   SubCutaneous three times a day before meals  insulin lispro (ADMELOG) corrective regimen sliding scale   SubCutaneous at bedtime  lactated ringers. 1000 milliLiter(s) (500 mL/Hr) IV Continuous <Continuous>  metoprolol succinate ER 25 milliGRAM(s) Oral two times a day  pantoprazole    Tablet 40 milliGRAM(s) Oral before breakfast  polyethylene glycol 3350 17 Gram(s) Oral daily  senna 2 Tablet(s) Oral at bedtime    MEDICATIONS  (PRN):  acetaminophen     Tablet .. 975 milliGRAM(s) Oral every 6 hours PRN Temp greater or equal to 38C (100.4F), Moderate Pain (4 - 6)  dextrose Oral Gel 15 Gram(s) Oral once PRN Blood Glucose LESS THAN 70 milliGRAM(s)/deciliter  oxyCODONE    IR 5 milliGRAM(s) Oral every 6 hours PRN Severe Pain (7 - 10)      CAPILLARY BLOOD GLUCOSE      POCT Blood Glucose.: 149 mg/dL (27 Oct 2023 12:00)  POCT Blood Glucose.: 134 mg/dL (27 Oct 2023 07:52)  POCT Blood Glucose.: 116 mg/dL (26 Oct 2023 21:14)  POCT Blood Glucose.: 140 mg/dL (26 Oct 2023 16:25)    I&O's Summary    26 Oct 2023 07:01  -  27 Oct 2023 07:00  --------------------------------------------------------  IN: 0 mL / OUT: 1000 mL / NET: -1000 mL        PHYSICAL EXAM:  Vital Signs Last 24 Hrs  T(C): 36.7 (27 Oct 2023 10:08), Max: 36.7 (26 Oct 2023 16:09)  T(F): 98 (27 Oct 2023 10:08), Max: 98.1 (27 Oct 2023 05:00)  HR: 116 (27 Oct 2023 10:08) (105 - 138)  BP: 110/68 (27 Oct 2023 10:08) (105/67 - 118/66)  BP(mean): --  RR: 18 (27 Oct 2023 10:08) (18 - 18)  SpO2: 97% (27 Oct 2023 10:08) (94% - 97%)    Parameters below as of 27 Oct 2023 10:08  Patient On (Oxygen Delivery Method): room air      CONSTITUTIONAL: Well-groomed, in no apparent distress  EYES: No conjunctival or scleral injection, non-icteric; PERRLA and symmetric  ENMT: No external nasal lesions; nasal mucosa not inflamed; no pharyngeal injection or exudates, oral mucosa with moist membranes  RESPIRATORY: Breathing comfortably; lungs CTA without wheeze/rhonchi/rales  CARDIOVASCULAR: RRR +S1S2, no M/G/R; pedal pulses full and symmetric; trace lower extremity edema; lower chest pain reproducible with palpation but improved from yesterdaay  GASTROINTESTINAL: interval improvement of moderate tenderness primarily epigastric and RUQ with voluntary guarding, No palpable masses, +BS throughout, no rebound no hepatosplenomegaly; no hernia palpated  LYMPHATIC: No cervical LAD or tenderness  MUSCULOSKELETAL: No malalignment of extremities; no joint swelling or tenderness  SKIN: No rashes or ulcers noted; no subcutaneous nodules or induration palpable  NEUROLOGIC: CN grossly intact; sensation intact in LEs b/l to light touch; normal strength and tone  PSYCHIATRIC: A+O x 3; mood and affect appropriate; appropriate insight and judgment    LABS:                        11.3   10.96 )-----------( 274      ( 27 Oct 2023 06:57 )             33.9     10-27    136  |  104  |  42<H>  ----------------------------<  118<H>  4.6   |  16<L>  |  1.74<H>    Ca    9.4      27 Oct 2023 06:57  Phos  3.7     10-26  Mg     1.9     10-26    TPro  6.4  /  Alb  2.6<L>  /  TBili  0.6  /  DBili  x   /  AST  58<H>  /  ALT  43  /  AlkPhos  153<H>  10-27      CARDIAC MARKERS ( 26 Oct 2023 11:16 )  x     / x     / 345 U/L / x     / 1.7 ng/mL      Urinalysis Basic - ( 27 Oct 2023 06:57 )    Color: x / Appearance: x / SG: x / pH: x  Gluc: 118 mg/dL / Ketone: x  / Bili: x / Urobili: x   Blood: x / Protein: x / Nitrite: x   Leuk Esterase: x / RBC: x / WBC x   Sq Epi: x / Non Sq Epi: x / Bacteria: x    SARS-CoV-2: NotDetec (26 Oct 2023 08:45)      RADIOLOGY & ADDITIONAL TESTS:  Results Reviewed:   Imaging Personally Reviewed: doppler no dvt  Electrocardiogram Personally Reviewed:    COORDINATION OF CARE:  Care Discussed with Consultants/Other Providers [Y/N]: cards Dr Hayes re AF and volume mgmt. Palliative re plan of care  Prior or Outpatient Records Reviewed [Y/N]: ID/onc reviewed

## 2023-10-27 NOTE — PROGRESS NOTE ADULT - ASSESSMENT
78M with CAD s/p NURIA to distal LAD in 6/2023, HTN, HLD, EtOH disorder, T2DM, spinal surgeries, neuroendocrine tumor on Lutathera (first treatment on Friday 10/20 at [facility] [place]). Admitted 10/23/2023 c/o fatigue since Lutahera dose and chest pain since Friday.  Also complains of constipation.  Noted to have mild leukocytosis and fever.  All cultures have been negative.  Elevated LFT.  CT with increased liver metastatic lesions.      Fever  - no clear infection  - BC remains negative  - duplex negative  - would monitor off antibiotics    Constipation  - would start more aggressive bowel regimen    I have discussed plan of care as detailed above with GRETTA Aldridge [id number]    Please call Infectious Diseases if there is a change in status.  Thank you.  [phone].

## 2023-10-27 NOTE — PROGRESS NOTE ADULT - PROBLEM SELECTOR PLAN 1
w/ RVR    d/w cardiology  increasing metoprolol  eliquis 5 BID for a/c - monitor cr  stop ASA and continue plavix to avoid triple therapy - d/w cards in detail

## 2023-10-27 NOTE — PROGRESS NOTE ADULT - PROBLEM SELECTOR PLAN 4
was already increasing but worsened after torsemide started  now d/c'ed by cards  FENa checked 2% but in setting of diuresis?  volume status a bit unclear - checking pocus and then consider IVF  monitor BMP  low threshold for renal eval if does not improve was already increasing but worsened after torsemide started  now d/c'ed by cards  FENa checked 2% but in setting of diuresis?  volume status a bit unclear due to pt's SOB after AF started- likely volume depleted so will give  cc bolus  monitor BMP  low threshold for renal eval if does not improve

## 2023-10-27 NOTE — PROGRESS NOTE ADULT - SUBJECTIVE AND OBJECTIVE BOX
Patient is a 78y old  Male who presents with a chief complaint of Chest pain     f/u fever    Interval History/ROS:  no fevers.  duplex negative for DVT.  Still tired but overall less so and generally feeling better.  constipated.  no dysuria.  Remainder of ROS otherwise negative.    PAST MEDICAL & SURGICAL HISTORY:  Hypertension  Hypercholesterolemia  PAD (peripheral artery disease)  Neuroendocrine carcinoma  S/P knee replacement, bilateral  S/P hip replacement right hip  History of hernia repair  H/O laminectomy  History of lumbosacral spine surgery    Allergies  No Known Allergies    ANTIMICROBIALS:  cefTRIAXone   IVPB (10/24-10/25)  cefepime   IVPB 1000 every 12 hours (10/23, 10/26)    MEDICATIONS  (STANDING):  allopurinol 200 daily  aspirin enteric coated 81 daily  atorvastatin 80 at bedtime  clopidogrel Tablet 75 daily  enoxaparin Injectable 40 every 24 hours  insulin lispro (ADMELOG) corrective regimen sliding scale  at bedtime  insulin lispro (ADMELOG) corrective regimen sliding scale  three times a day before meals  metoprolol succinate ER 25 daily  pantoprazole    Tablet 40 before breakfast  polyethylene glycol 3350 17 daily  senna 2 at bedtime  torsemide 40 two times a day    Vital Signs Last 24 Hrs  T(F): 98 (10-27-23 @ 10:08), Max: 98.1 (10-27-23 @ 05:00)  HR: 116 (10-27-23 @ 10:08)  BP: 110/68 (10-27-23 @ 10:08)  RR: 18 (10-27-23 @ 10:08)  SpO2: 97% (10-27-23 @ 10:08) (94% - 97%)    PHYSICAL EXAM:  Constitutional: non-toxic  HEAD/EYES: anicteric  ENT:  supple  Cardiovascular:   normal S1, S2  Respiratory:  clear BS bilaterally  GI:  soft, non-tender, normal bowel sounds  :  no tavarez  Musculoskeletal:  no synovitis  Neurologic: awake and alert, normal strength, no focal findings  Skin:  no rash  Psychiatric:  awake, alert, appropriate mood                        11.3   10.96 )-----------( 274      ( 27 Oct 2023 06:57 )             33.9 10-27    136  |  104  |  42  ----------------------------<  118  4.6   |  16  |  1.74  Ca    9.4      27 Oct 2023 06:57Phos  3.7     10-26Mg     1.9     10-26  TPro  6.4  /  Alb  2.6  /  TBili  0.6  /  DBili  x   /  AST  58  /  ALT  43  /  AlkPhos  153  10-27    Urinalysis + Microscopic Examination (10.23.23 @ 20:12)   pH Urine: 5.0  Urine Appearance: Clear  Color: Light Yellow  Specific Gravity: 1.024  Protein, Urine: Trace  Glucose Qualitative, Urine: Negative  Ketone - Urine: Negative  Blood, Urine: Negative  Bilirubin: Negative  Urobilinogen: Negative  Leukocyte Esterase Concentration: Negative  Nitrite: Negative  White Blood Cell - Urine: 2 /HPF  Red Blood Cell - Urine: 2 /hpf  Bacteria: Negative  Hyaline Casts: 6 /lpf  Squamous Epithelial Cells: 1 /hpf    MICROBIOLOGY:  Culture - Urine (collected 10-23-23 @ 20:12)  Source: Clean Catch Clean Catch (Midstream)  Final Report (10-25-23 @ 09:38):    <10,000 CFU/mL Normal Urogenital [place]    Culture - Blood (collected 10-23-23 @ 17:45)  Source: .Blood Blood-Peripheral  Preliminary Report (10-26-23 @ 22:00):    No growth at 72 Hours    Culture - Blood (collected 10-23-23 @ 17:30)  Source: .Blood Blood-Peripheral  Preliminary Report (10-26-23 @ 22:00):    No growth at 72 Hours    RADIOLOGY:  VA Duplex Lower Ext Vein Scan, Bilat (10.26.23 @ 22:35) >  IMPRESSION:  No obvious deep vein thrombosis in either lower extremity.    CT Angio Chest PE Protocol w/ IV Cont (10.23.23 @ 18:58) >  IMPRESSION:  No pulmonary embolism. Evaluation of the segmental/subsegmental arteries is limited due to suboptimal arterial opacification and respiratory motion artifact.  Multiple metastatic hepatic lesions, increased in size and number since 8/16/2023.

## 2023-10-28 LAB
ALBUMIN SERPL ELPH-MCNC: 2.6 G/DL — LOW (ref 3.3–5)
ALBUMIN SERPL ELPH-MCNC: 2.6 G/DL — LOW (ref 3.3–5)
ALP SERPL-CCNC: 203 U/L — HIGH (ref 40–120)
ALP SERPL-CCNC: 203 U/L — HIGH (ref 40–120)
ALT FLD-CCNC: 67 U/L — HIGH (ref 10–45)
ALT FLD-CCNC: 67 U/L — HIGH (ref 10–45)
ANION GAP SERPL CALC-SCNC: 16 MMOL/L — SIGNIFICANT CHANGE UP (ref 5–17)
ANION GAP SERPL CALC-SCNC: 16 MMOL/L — SIGNIFICANT CHANGE UP (ref 5–17)
AST SERPL-CCNC: 109 U/L — HIGH (ref 10–40)
AST SERPL-CCNC: 109 U/L — HIGH (ref 10–40)
BASOPHILS # BLD AUTO: 0.03 K/UL — SIGNIFICANT CHANGE UP (ref 0–0.2)
BASOPHILS # BLD AUTO: 0.03 K/UL — SIGNIFICANT CHANGE UP (ref 0–0.2)
BASOPHILS NFR BLD AUTO: 0.3 % — SIGNIFICANT CHANGE UP (ref 0–2)
BASOPHILS NFR BLD AUTO: 0.3 % — SIGNIFICANT CHANGE UP (ref 0–2)
BILIRUB SERPL-MCNC: 0.5 MG/DL — SIGNIFICANT CHANGE UP (ref 0.2–1.2)
BILIRUB SERPL-MCNC: 0.5 MG/DL — SIGNIFICANT CHANGE UP (ref 0.2–1.2)
BUN SERPL-MCNC: 56 MG/DL — HIGH (ref 7–23)
BUN SERPL-MCNC: 56 MG/DL — HIGH (ref 7–23)
CALCIUM SERPL-MCNC: 8.7 MG/DL — SIGNIFICANT CHANGE UP (ref 8.4–10.5)
CALCIUM SERPL-MCNC: 8.7 MG/DL — SIGNIFICANT CHANGE UP (ref 8.4–10.5)
CHLORIDE SERPL-SCNC: 102 MMOL/L — SIGNIFICANT CHANGE UP (ref 96–108)
CHLORIDE SERPL-SCNC: 102 MMOL/L — SIGNIFICANT CHANGE UP (ref 96–108)
CO2 SERPL-SCNC: 17 MMOL/L — LOW (ref 22–31)
CO2 SERPL-SCNC: 17 MMOL/L — LOW (ref 22–31)
CREAT ?TM UR-MCNC: 104 MG/DL — SIGNIFICANT CHANGE UP
CREAT ?TM UR-MCNC: 104 MG/DL — SIGNIFICANT CHANGE UP
CREAT SERPL-MCNC: 2.04 MG/DL — HIGH (ref 0.5–1.3)
CREAT SERPL-MCNC: 2.04 MG/DL — HIGH (ref 0.5–1.3)
CULTURE RESULTS: SIGNIFICANT CHANGE UP
EGFR: 33 ML/MIN/1.73M2 — LOW
EGFR: 33 ML/MIN/1.73M2 — LOW
EOSINOPHIL # BLD AUTO: 0.08 K/UL — SIGNIFICANT CHANGE UP (ref 0–0.5)
EOSINOPHIL # BLD AUTO: 0.08 K/UL — SIGNIFICANT CHANGE UP (ref 0–0.5)
EOSINOPHIL NFR BLD AUTO: 0.8 % — SIGNIFICANT CHANGE UP (ref 0–6)
EOSINOPHIL NFR BLD AUTO: 0.8 % — SIGNIFICANT CHANGE UP (ref 0–6)
GLUCOSE BLDC GLUCOMTR-MCNC: 116 MG/DL — HIGH (ref 70–99)
GLUCOSE BLDC GLUCOMTR-MCNC: 116 MG/DL — HIGH (ref 70–99)
GLUCOSE BLDC GLUCOMTR-MCNC: 122 MG/DL — HIGH (ref 70–99)
GLUCOSE BLDC GLUCOMTR-MCNC: 134 MG/DL — HIGH (ref 70–99)
GLUCOSE BLDC GLUCOMTR-MCNC: 134 MG/DL — HIGH (ref 70–99)
GLUCOSE BLDC GLUCOMTR-MCNC: 157 MG/DL — HIGH (ref 70–99)
GLUCOSE BLDC GLUCOMTR-MCNC: 157 MG/DL — HIGH (ref 70–99)
GLUCOSE SERPL-MCNC: 124 MG/DL — HIGH (ref 70–99)
GLUCOSE SERPL-MCNC: 124 MG/DL — HIGH (ref 70–99)
HCT VFR BLD CALC: 33.2 % — LOW (ref 39–50)
HCT VFR BLD CALC: 33.2 % — LOW (ref 39–50)
HGB BLD-MCNC: 11 G/DL — LOW (ref 13–17)
HGB BLD-MCNC: 11 G/DL — LOW (ref 13–17)
IMM GRANULOCYTES NFR BLD AUTO: 1 % — HIGH (ref 0–0.9)
IMM GRANULOCYTES NFR BLD AUTO: 1 % — HIGH (ref 0–0.9)
LYMPHOCYTES # BLD AUTO: 0.66 K/UL — LOW (ref 1–3.3)
LYMPHOCYTES # BLD AUTO: 0.66 K/UL — LOW (ref 1–3.3)
LYMPHOCYTES # BLD AUTO: 6.7 % — LOW (ref 13–44)
LYMPHOCYTES # BLD AUTO: 6.7 % — LOW (ref 13–44)
MCHC RBC-ENTMCNC: 30.1 PG — SIGNIFICANT CHANGE UP (ref 27–34)
MCHC RBC-ENTMCNC: 30.1 PG — SIGNIFICANT CHANGE UP (ref 27–34)
MCHC RBC-ENTMCNC: 33.1 GM/DL — SIGNIFICANT CHANGE UP (ref 32–36)
MCHC RBC-ENTMCNC: 33.1 GM/DL — SIGNIFICANT CHANGE UP (ref 32–36)
MCV RBC AUTO: 91 FL — SIGNIFICANT CHANGE UP (ref 80–100)
MCV RBC AUTO: 91 FL — SIGNIFICANT CHANGE UP (ref 80–100)
MONOCYTES # BLD AUTO: 1.02 K/UL — HIGH (ref 0–0.9)
MONOCYTES # BLD AUTO: 1.02 K/UL — HIGH (ref 0–0.9)
MONOCYTES NFR BLD AUTO: 10.4 % — SIGNIFICANT CHANGE UP (ref 2–14)
MONOCYTES NFR BLD AUTO: 10.4 % — SIGNIFICANT CHANGE UP (ref 2–14)
NEUTROPHILS # BLD AUTO: 7.94 K/UL — HIGH (ref 1.8–7.4)
NEUTROPHILS # BLD AUTO: 7.94 K/UL — HIGH (ref 1.8–7.4)
NEUTROPHILS NFR BLD AUTO: 80.8 % — HIGH (ref 43–77)
NEUTROPHILS NFR BLD AUTO: 80.8 % — HIGH (ref 43–77)
NRBC # BLD: 0 /100 WBCS — SIGNIFICANT CHANGE UP (ref 0–0)
NRBC # BLD: 0 /100 WBCS — SIGNIFICANT CHANGE UP (ref 0–0)
PLATELET # BLD AUTO: 303 K/UL — SIGNIFICANT CHANGE UP (ref 150–400)
PLATELET # BLD AUTO: 303 K/UL — SIGNIFICANT CHANGE UP (ref 150–400)
POTASSIUM SERPL-MCNC: 4.2 MMOL/L — SIGNIFICANT CHANGE UP (ref 3.5–5.3)
POTASSIUM SERPL-MCNC: 4.2 MMOL/L — SIGNIFICANT CHANGE UP (ref 3.5–5.3)
POTASSIUM SERPL-SCNC: 4.2 MMOL/L — SIGNIFICANT CHANGE UP (ref 3.5–5.3)
POTASSIUM SERPL-SCNC: 4.2 MMOL/L — SIGNIFICANT CHANGE UP (ref 3.5–5.3)
PROT SERPL-MCNC: 6.5 G/DL — SIGNIFICANT CHANGE UP (ref 6–8.3)
PROT SERPL-MCNC: 6.5 G/DL — SIGNIFICANT CHANGE UP (ref 6–8.3)
RBC # BLD: 3.65 M/UL — LOW (ref 4.2–5.8)
RBC # BLD: 3.65 M/UL — LOW (ref 4.2–5.8)
RBC # FLD: 14.4 % — SIGNIFICANT CHANGE UP (ref 10.3–14.5)
RBC # FLD: 14.4 % — SIGNIFICANT CHANGE UP (ref 10.3–14.5)
SODIUM SERPL-SCNC: 135 MMOL/L — SIGNIFICANT CHANGE UP (ref 135–145)
SODIUM SERPL-SCNC: 135 MMOL/L — SIGNIFICANT CHANGE UP (ref 135–145)
SPECIMEN SOURCE: SIGNIFICANT CHANGE UP
UUN UR-MCNC: 870 MG/DL — SIGNIFICANT CHANGE UP
UUN UR-MCNC: 870 MG/DL — SIGNIFICANT CHANGE UP
WBC # BLD: 9.83 K/UL — SIGNIFICANT CHANGE UP (ref 3.8–10.5)
WBC # BLD: 9.83 K/UL — SIGNIFICANT CHANGE UP (ref 3.8–10.5)
WBC # FLD AUTO: 9.83 K/UL — SIGNIFICANT CHANGE UP (ref 3.8–10.5)
WBC # FLD AUTO: 9.83 K/UL — SIGNIFICANT CHANGE UP (ref 3.8–10.5)

## 2023-10-28 PROCEDURE — 99233 SBSQ HOSP IP/OBS HIGH 50: CPT

## 2023-10-28 RX ORDER — SODIUM CHLORIDE 9 MG/ML
1000 INJECTION, SOLUTION INTRAVENOUS
Refills: 0 | Status: DISCONTINUED | OUTPATIENT
Start: 2023-10-28 | End: 2023-10-30

## 2023-10-28 RX ADMIN — SENNA PLUS 2 TABLET(S): 8.6 TABLET ORAL at 21:07

## 2023-10-28 RX ADMIN — Medication 200 MILLIGRAM(S): at 11:45

## 2023-10-28 RX ADMIN — APIXABAN 5 MILLIGRAM(S): 2.5 TABLET, FILM COATED ORAL at 17:24

## 2023-10-28 RX ADMIN — Medication 25 MILLIGRAM(S): at 21:07

## 2023-10-28 RX ADMIN — CLOPIDOGREL BISULFATE 75 MILLIGRAM(S): 75 TABLET, FILM COATED ORAL at 11:45

## 2023-10-28 RX ADMIN — APIXABAN 5 MILLIGRAM(S): 2.5 TABLET, FILM COATED ORAL at 05:45

## 2023-10-28 RX ADMIN — Medication 25 MILLIGRAM(S): at 05:45

## 2023-10-28 RX ADMIN — CHLORHEXIDINE GLUCONATE 1 APPLICATION(S): 213 SOLUTION TOPICAL at 11:45

## 2023-10-28 RX ADMIN — SODIUM CHLORIDE 75 MILLILITER(S): 9 INJECTION, SOLUTION INTRAVENOUS at 10:22

## 2023-10-28 RX ADMIN — ATORVASTATIN CALCIUM 80 MILLIGRAM(S): 80 TABLET, FILM COATED ORAL at 21:07

## 2023-10-28 RX ADMIN — PANTOPRAZOLE SODIUM 40 MILLIGRAM(S): 20 TABLET, DELAYED RELEASE ORAL at 05:45

## 2023-10-28 RX ADMIN — POLYETHYLENE GLYCOL 3350 17 GRAM(S): 17 POWDER, FOR SOLUTION ORAL at 11:45

## 2023-10-28 NOTE — PROGRESS NOTE ADULT - SUBJECTIVE AND OBJECTIVE BOX
CHIEF COMPLAINT  Chest Pain    HISTORY OF PRESENT ILLNESS  NANDO HERNANDEZ is a 78y Male who presents with a chief complaint of chest pain    No acute events. No complaints.    REVIEW OF SYSTEMS  A complete review of systems was performed; negative except per HPI    PHYSICAL EXAM  T(C): 36.3 (10-28-23 @ 04:23), Max: 37.1 (10-27-23 @ 21:15)  HR: 114 (10-28-23 @ 04:23) (103 - 126)  BP: 105/64 (10-28-23 @ 04:23) (102/66 - 125/70)  RR: 18 (10-28-23 @ 04:23) (18 - 18)  SpO2: 93% (10-28-23 @ 04:23) (93% - 97%)  Constitutional: alert, awake, in no acute distress  Eyes: PERRL, EOMI  HEENT: normocephalic, atraumatic  Neck: supple, non-tender  Cardiovascular: normal perfusion, tachycardic  Respiratory: normal respiratory efforts; no increased use of accessory muscles  Gastrointestinal: soft, non-tender  Musculoskeletal: normal range of motion, no deformities noted  Neurological: alert, CN II to XI grossly intact  Skin: warm, dry    LABORATORY DATA                        11.0   9.83  )-----------( 303      ( 28 Oct 2023 07:13 )             33.2     10-28    135  |  102  |  56<H>  ----------------------------<  124<H>  4.2   |  17<L>  |  2.04<H>    Ca    8.7      28 Oct 2023 07:13    TPro  6.5  /  Alb  2.6<L>  /  TBili  0.5  /  DBili  x   /  AST  109<H>  /  ALT  67<H>  /  AlkPhos  203<H>  10-28

## 2023-10-28 NOTE — PROGRESS NOTE ADULT - SUBJECTIVE AND OBJECTIVE BOX
DATE OF SERVICE: 10-28-23 @ 22:28    Patient is a 78y old  Male who presents with a chief complaint of Chest pain (28 Oct 2023 13:36)      INTERVAL HISTORY:     REVIEW OF SYSTEMS:  CONSTITUTIONAL: No fever, weight loss, + fatigue  EYES: No eye pain, visual disturbances, or discharge  ENMT:  No difficulty hearing, tinnitus, vertigo; No sinus or throat pain  RESPIRATORY: No SOB. No cough, wheezing, chills or hemoptysis  CARDIOVASCULAR: +chest pain, No palpitations or dizziness, + leg swelling  GASTROINTESTINAL: +abdominal pain. No nausea, vomiting, or hematemesis; No diarrhea. + constipation. No melena or hematochezia.  GENITOURINARY: No dysuria, frequency, hematuria, or incontinence  NEUROLOGICAL: No headaches, memory loss, loss of strength, numbness, or tremors  SKIN: No itching, burning, rashes, or lesions   MUSCULOSKELETAL: No joint pain or swelling; No muscle, back pain, + LE pain  HEME/LYMPH: No easy bruising, or bleeding gums      TELEMETRY Personally reviewed:  	  MEDICATIONS:  metoprolol tartrate 25 milliGRAM(s) Oral three times a day        Vital Signs   T(C): 36.7 (10-28-23 @ 17:12), Max: 36.8 (10-28-23 @ 00:05)  HR: 116 (10-28-23 @ 21:00) (105 - 120)  BP: 133/72 (10-28-23 @ 21:00) (100/61 - 133/72)  RR: 18 (10-28-23 @ 17:12) (18 - 18)  SpO2: 95% (10-28-23 @ 17:12) (93% - 96%)  Wt(kg): --  I&O's Summary    27 Oct 2023 07:01  -  28 Oct 2023 07:00  --------------------------------------------------------  IN: 320 mL / OUT: 900 mL / NET: -580 mL        PHYSICAL EXAM:  CONSTITUTIONAL: Well-groomed, in no apparent distress  EYES: No conjunctival or scleral injection, non-icteric; PERRLA and symmetric  ENMT: No external nasal lesions; nasal mucosa not inflamed; no pharyngeal injection or exudates, oral mucosa with moist membranes  RESPIRATORY: Breathing comfortably; lungs CTA without wheeze/rhonchi/rales  CARDIOVASCULAR: Tachycardic, irregular rhythm, +S1S2, no M/G/R; pedal pulses full and symmetric; trace lower extremity edema; lower chest pain reproducible with palpation.  GASTROINTESTINAL: Mild diffuse tenderness, No palpable masses, +BS throughout, no rebound/guarding; no hepatosplenomegaly; no hernia palpated                              11.0   9.83  )-----------( 303      ( 28 Oct 2023 07:13 )             33.2     10-28    135  |  102  |  56<H>  ----------------------------<  124<H>  4.2   |  17<L>  |  2.04<H>    Ca    8.7      28 Oct 2023 07:13    TPro  6.5  /  Alb  2.6<L>  /  TBili  0.5  /  DBili  x   /  AST  109<H>  /  ALT  67<H>  /  AlkPhos  203<H>  10-28        Labs personally reviewed      ASSESSMENT/PLAN: 	            ESTEFANI Velez DO [facility]  Cardiovascular Medicine  [address] [place] [address], [address]  Available through call or text on [employer]  Office: [phone]   DATE OF SERVICE: 10-28-23 @ 22:28    Patient is a 78y old  Male who presents with a chief complaint of Chest pain (28 Oct 2023 13:36)      INTERVAL HISTORY: feels ok    REVIEW OF SYSTEMS:  CONSTITUTIONAL: No fever, weight loss, + fatigue  EYES: No eye pain, visual disturbances, or discharge  ENMT:  No difficulty hearing, tinnitus, vertigo; No sinus or throat pain  RESPIRATORY: No SOB. No cough, wheezing, chills or hemoptysis  CARDIOVASCULAR: +chest pain, No palpitations or dizziness, + leg swelling  GASTROINTESTINAL: +abdominal pain. No nausea, vomiting, or hematemesis; No diarrhea. + constipation. No melena or hematochezia.  GENITOURINARY: No dysuria, frequency, hematuria, or incontinence  NEUROLOGICAL: No headaches, memory loss, loss of strength, numbness, or tremors  SKIN: No itching, burning, rashes, or lesions   MUSCULOSKELETAL: No joint pain or swelling; No muscle, back pain, + LE pain  HEME/LYMPH: No easy bruising, or bleeding gums      TELEMETRY Personally reviewed:  	  MEDICATIONS:  metoprolol tartrate 25 milliGRAM(s) Oral three times a day        Vital Signs   T(C): 36.7 (10-28-23 @ 17:12), Max: 36.8 (10-28-23 @ 00:05)  HR: 116 (10-28-23 @ 21:00) (105 - 120)  BP: 133/72 (10-28-23 @ 21:00) (100/61 - 133/72)  RR: 18 (10-28-23 @ 17:12) (18 - 18)  SpO2: 95% (10-28-23 @ 17:12) (93% - 96%)  Wt(kg): --  I&O's Summary    27 Oct 2023 07:01  -  28 Oct 2023 07:00  --------------------------------------------------------  IN: 320 mL / OUT: 900 mL / NET: -580 mL        PHYSICAL EXAM:  CONSTITUTIONAL: Well-groomed, in no apparent distress  EYES: No conjunctival or scleral injection, non-icteric; PERRLA and symmetric  ENMT: No external nasal lesions; nasal mucosa not inflamed; no pharyngeal injection or exudates, oral mucosa with moist membranes  RESPIRATORY: Breathing comfortably; lungs CTA without wheeze/rhonchi/rales  CARDIOVASCULAR: Tachycardic, irregular rhythm, +S1S2, no M/G/R; pedal pulses full and symmetric; trace lower extremity edema; lower chest pain reproducible with palpation.  GASTROINTESTINAL: Mild diffuse tenderness, No palpable masses, +BS throughout, no rebound/guarding; no hepatosplenomegaly; no hernia palpated                              11.0   9.83  )-----------( 303      ( 28 Oct 2023 07:13 )             33.2     10-28    135  |  102  |  56<H>  ----------------------------<  124<H>  4.2   |  17<L>  |  2.04<H>    Ca    8.7      28 Oct 2023 07:13    TPro  6.5  /  Alb  2.6<L>  /  TBili  0.5  /  DBili  x   /  AST  109<H>  /  ALT  67<H>  /  AlkPhos  203<H>  10-28        Labs personally reviewed      ASSESSMENT/PLAN: 	  78 M with hx of CAD s/p NURIA to distal LAD in 6/2023, HTN, HLD, EtOH disorder, T2DM, spinal surgeries, neuroendocrine tumor on Lutathera (first treatment on Friday 10/20 at [facility] [place]), presenting with fatigue and chest pain since Friday. Patient reports severe fatigue starting Friday after his first treatment with Lutathera. Pt also reported mid-left lower chest pain described as pressure. No radiation, no aggravating or alleviating factors. Pt has never felt this type of chest pain before. Denies palpitations, diaphoresis, dyspnea, nausea, vomiting. Pt has diffuse abdominal pain. Also with constipation; last BM several days ago. No other acute complaints.      Problem/Plan - 1:  ·  Problem: Chest Pain.   ·  Plan: Location described as LUQ abdomen with pain elicited with palpation, appears noncardiac   - EKG SR with PACs, no ischemic characteristics  - Trop 72-->83--> 81/ CKMB 1.9  - 10/26 reports CP, trop increased to 110 --> 133 likely 2/2 demand i/s/o ST and fever. Trend to peak.   - Mildly elevated LFTS and Lipase noted  - Hx of recent PCI  - Repeat TTE unchanged   - CP possibly 2/2 lutathera with known S/E of abdominal discomfort  - 10/27 CP improved      Problem/Plan - 2:  ·  Problem: Shortness of Breath  ·  Plan: ECG non-ischemic  - TTE wnl.  - BNP 2572. Repeat 4171.  - CT neg for PE, no pulm edema noted  - s/p Lasix 20 IVP once. Will hold further diuretics given RAZIA.      Problem/Plan - 3:  ·  Problem: CAD.   ·  Plan: Recent PCI to pLAD in June 2023  - ECG non-ischemic  - Trop/CKMB as noted above  - c/w Plavix and statin, d/c ASA given starting Eliquis  - TTE unchanged     Problem/Plan - 4:  ·  Problem: RAZIA  ·  Plan: Likely dry, will gentle hydrate      Problem/Plan - 5:  ·  Problem: New Onset Atrial Fibrillation  - As per tele, converted to rapid AF rates 120-140bpm  - Check TSH  - Will increase Toprol to 25mg PO TID, uptitrate to 50mg BID if BP tolerates  - Will consider DCCV if remains in AF RVR  - FAFRr4AODH of 2. Recommend AC.  - Eliquis 5mg BID             ESTEFANI Velez DO [facility]  Cardiovascular Medicine  [address] [place] [address], [address]  Available through call or text on [employer] TE[employer]  Office: [phone]

## 2023-10-28 NOTE — PROGRESS NOTE ADULT - ASSESSMENT
NANDO HERNANDEZ is a 78y Male who presents with a chief complaint of chest pain    Metastatic Pancreatic Neuroendocrine Tumor  ·	Patient follows with Dr. Sophia Prasad, [facility].  ·	Recent progression of disease; last received Lutetium Edith-[id number] LUTATHERA on October 20th.  ·	No systemic therapy while inpatient or during rehabilitation    Anemia  ·	In the setting of inflammation, active malignancy  ·	Monitor and maintain HGB > 7    Chest Pain  Dyspnea  ·	Possibly due to abdominal pain which can occur with LUTATHERA  ·	Echocardiogram with no obvious abnormalities. EKG non-ischemic  ·	Diuretics on hold due to kidney injury.  ·	Follow-up cardiology recommendations    Acute Kidney Injury  ·	In the setting of recent diuresis.  ·	Creatinine worsened to 2.04.  ·	Continue to monitor; consider nephrology evaluation.    Atrial Fibrillation  ·	Patient is now on apixaban and clopidogrel.  ·	Also on rate control with metoprolol  ·	Follow-up cardiology recommendations    Will continue to follow.    Sedrick Tejada MD  Hematology/Oncology  O: [phone]

## 2023-10-28 NOTE — PROGRESS NOTE ADULT - PROBLEM SELECTOR PLAN 1
w/ RVR    d/w cardiology  increasing metoprolol to 25mg TID, required dig x 1 yesterday evening  eliquis 5 BID for a/c - monitor cr  stop ASA and continue plavix to avoid triple therapy - d/w cards in detail

## 2023-10-28 NOTE — PROGRESS NOTE ADULT - PROBLEM SELECTOR PLAN 4
was already increasing but worsened after torsemide started  now d/c'ed by cards  Urine lytes resent to calculuate FeUrea given lasix given 10/27  Labs evaluated with worsening Cr and transaminases concerning for low volume state, discussed with Dr. Hayes and will start gentle hydration with LR @75 for 10 hours   monitor BMP  If Cr does not improve, would consult renal tomorrow.

## 2023-10-28 NOTE — PROGRESS NOTE ADULT - ASSESSMENT
78 M with hx of CAD s/p NURIA to distal LAD in 6/2023, HTN, HLD, EtOH disorder, T2DM, spinal surgeries, neuroendocrine tumor on Lutathera (first treatment on Friday 10/20 at [facility]), presenting with fatigue and chest pain, fever + SIRS criteria. Concern for reaction to Lutathera vs progression of disease given start of symptoms, still with fever

## 2023-10-28 NOTE — PROGRESS NOTE ADULT - SUBJECTIVE AND OBJECTIVE BOX
Tasia Cardona MD  Division of Hospital Medicine  Reachable on MS Teams    PROGRESS NOTE:     Patient is a 78y old  Male who presents with a chief complaint of Chest pain (28 Oct 2023 08:44)      SUBJECTIVE / OVERNIGHT EVENTS: Afib RVR yesterday evening requiring dig, seen and evaluated this AM. Resting in bed comfortably, all questions answered. Encouraged PO intake, discussed adding glucerna supplementation given he is eating less food.     ADDITIONAL REVIEW OF SYSTEMS:    MEDICATIONS  (STANDING):  allopurinol 200 milliGRAM(s) Oral daily  apixaban 5 milliGRAM(s) Oral two times a day  atorvastatin 80 milliGRAM(s) Oral at bedtime  chlorhexidine 2% Cloths 1 Application(s) Topical daily  clopidogrel Tablet 75 milliGRAM(s) Oral daily  dextrose 5%. 1000 milliLiter(s) (100 mL/Hr) IV Continuous <Continuous>  dextrose 5%. 1000 milliLiter(s) (50 mL/Hr) IV Continuous <Continuous>  dextrose 50% Injectable 25 Gram(s) IV Push once  dextrose 50% Injectable 25 Gram(s) IV Push once  dextrose 50% Injectable 12.5 Gram(s) IV Push once  glucagon  Injectable 1 milliGRAM(s) IntraMuscular once  insulin lispro (ADMELOG) corrective regimen sliding scale   SubCutaneous at bedtime  insulin lispro (ADMELOG) corrective regimen sliding scale   SubCutaneous three times a day before meals  lactated ringers. 1000 milliLiter(s) (500 mL/Hr) IV Continuous <Continuous>  lactated ringers. 1000 milliLiter(s) (75 mL/Hr) IV Continuous <Continuous>  metoprolol tartrate 25 milliGRAM(s) Oral three times a day  pantoprazole    Tablet 40 milliGRAM(s) Oral before breakfast  polyethylene glycol 3350 17 Gram(s) Oral daily  senna 2 Tablet(s) Oral at bedtime    MEDICATIONS  (PRN):  acetaminophen     Tablet .. 975 milliGRAM(s) Oral every 6 hours PRN Temp greater or equal to 38C (100.4F), Moderate Pain (4 - 6)  dextrose Oral Gel 15 Gram(s) Oral once PRN Blood Glucose LESS THAN 70 milliGRAM(s)/deciliter  oxyCODONE    IR 5 milliGRAM(s) Oral every 6 hours PRN Severe Pain (7 - 10)      CAPILLARY BLOOD GLUCOSE      POCT Blood Glucose.: 122 mg/dL (28 Oct 2023 12:10)  POCT Blood Glucose.: 116 mg/dL (28 Oct 2023 07:49)  POCT Blood Glucose.: 144 mg/dL (27 Oct 2023 21:07)  POCT Blood Glucose.: 157 mg/dL (27 Oct 2023 17:30)  POCT Blood Glucose.: 117 mg/dL (27 Oct 2023 16:39)    I&O's Summary    27 Oct 2023 07:01  -  28 Oct 2023 07:00  --------------------------------------------------------  IN: 320 mL / OUT: 900 mL / NET: -580 mL        PHYSICAL EXAM:  Vital Signs Last 24 Hrs  T(C): 36.3 (28 Oct 2023 12:37), Max: 37.1 (27 Oct 2023 21:15)  T(F): 97.4 (28 Oct 2023 12:37), Max: 98.7 (27 Oct 2023 21:15)  HR: 112 (28 Oct 2023 12:37) (103 - 126)  BP: 111/75 (28 Oct 2023 12:37) (100/61 - 125/70)  BP(mean): --  RR: 18 (28 Oct 2023 12:37) (18 - 18)  SpO2: 96% (28 Oct 2023 12:37) (93% - 96%)    Parameters below as of 28 Oct 2023 12:37  Patient On (Oxygen Delivery Method): room air        CONSTITUTIONAL: NAD, well-developed  RESPIRATORY: Normal respiratory effort; lungs are clear to auscultation bilaterally  CARDIOVASCULAR: irregularly irregular, normal S1 and S2, no murmur/rub/gallop; trace extremity edema; Peripheral pulses are 2+ bilaterally  ABDOMEN: Nontender to palpation, normoactive bowel sounds, no rebound/guarding; No hepatosplenomegaly  MUSCLOSKELETAL: no clubbing or cyanosis of digits; no joint swelling or tenderness to palpation  PSYCH: A+O to person, place, and time; affect appropriate    LABS:                        11.0   9.83  )-----------( 303      ( 28 Oct 2023 07:13 )             33.2     10-28    135  |  102  |  56<H>  ----------------------------<  124<H>  4.2   |  17<L>  |  2.04<H>    Ca    8.7      28 Oct 2023 07:13    TPro  6.5  /  Alb  2.6<L>  /  TBili  0.5  /  DBili  x   /  AST  109<H>  /  ALT  67<H>  /  AlkPhos  203<H>  10-28          Urinalysis Basic - ( 28 Oct 2023 07:13 )    Color: x / Appearance: x / SG: x / pH: x  Gluc: 124 mg/dL / Ketone: x  / Bili: x / Urobili: x   Blood: x / Protein: x / Nitrite: x   Leuk Esterase: x / RBC: x / WBC x   Sq Epi: x / Non Sq Epi: x / Bacteria: x        Culture - Blood (collected 26 Oct 2023 10:47)  Source: .Blood Blood-Peripheral  Preliminary Report (27 Oct 2023 15:02):    No growth at 24 hours    Culture - Blood (collected 26 Oct 2023 10:43)  Source: .Blood Blood-Peripheral  Preliminary Report (27 Oct 2023 15:02):    No growth at 24 hours        RADIOLOGY & ADDITIONAL TESTS:  Results Reviewed:   Imaging Personally Reviewed:  Electrocardiogram Personally Reviewed:    COORDINATION OF CARE:  Care Discussed with Consultants/Other Providers [Y/N]: Cards  Prior or Outpatient Records Reviewed [Y/N]:

## 2023-10-29 LAB
ALBUMIN SERPL ELPH-MCNC: 2.6 G/DL — LOW (ref 3.3–5)
ALBUMIN SERPL ELPH-MCNC: 2.6 G/DL — LOW (ref 3.3–5)
ALP SERPL-CCNC: 241 U/L — HIGH (ref 40–120)
ALP SERPL-CCNC: 241 U/L — HIGH (ref 40–120)
ALT FLD-CCNC: 83 U/L — HIGH (ref 10–45)
ALT FLD-CCNC: 83 U/L — HIGH (ref 10–45)
ANION GAP SERPL CALC-SCNC: 13 MMOL/L — SIGNIFICANT CHANGE UP (ref 5–17)
ANION GAP SERPL CALC-SCNC: 13 MMOL/L — SIGNIFICANT CHANGE UP (ref 5–17)
AST SERPL-CCNC: 107 U/L — HIGH (ref 10–40)
AST SERPL-CCNC: 107 U/L — HIGH (ref 10–40)
BILIRUB SERPL-MCNC: 0.5 MG/DL — SIGNIFICANT CHANGE UP (ref 0.2–1.2)
BILIRUB SERPL-MCNC: 0.5 MG/DL — SIGNIFICANT CHANGE UP (ref 0.2–1.2)
BUN SERPL-MCNC: 60 MG/DL — HIGH (ref 7–23)
BUN SERPL-MCNC: 60 MG/DL — HIGH (ref 7–23)
CALCIUM SERPL-MCNC: 9 MG/DL — SIGNIFICANT CHANGE UP (ref 8.4–10.5)
CALCIUM SERPL-MCNC: 9 MG/DL — SIGNIFICANT CHANGE UP (ref 8.4–10.5)
CHLORIDE SERPL-SCNC: 104 MMOL/L — SIGNIFICANT CHANGE UP (ref 96–108)
CHLORIDE SERPL-SCNC: 104 MMOL/L — SIGNIFICANT CHANGE UP (ref 96–108)
CO2 SERPL-SCNC: 21 MMOL/L — LOW (ref 22–31)
CO2 SERPL-SCNC: 21 MMOL/L — LOW (ref 22–31)
CREAT SERPL-MCNC: 1.61 MG/DL — HIGH (ref 0.5–1.3)
CREAT SERPL-MCNC: 1.61 MG/DL — HIGH (ref 0.5–1.3)
EGFR: 44 ML/MIN/1.73M2 — LOW
EGFR: 44 ML/MIN/1.73M2 — LOW
GLUCOSE BLDC GLUCOMTR-MCNC: 133 MG/DL — HIGH (ref 70–99)
GLUCOSE BLDC GLUCOMTR-MCNC: 133 MG/DL — HIGH (ref 70–99)
GLUCOSE BLDC GLUCOMTR-MCNC: 136 MG/DL — HIGH (ref 70–99)
GLUCOSE BLDC GLUCOMTR-MCNC: 136 MG/DL — HIGH (ref 70–99)
GLUCOSE BLDC GLUCOMTR-MCNC: 141 MG/DL — HIGH (ref 70–99)
GLUCOSE BLDC GLUCOMTR-MCNC: 141 MG/DL — HIGH (ref 70–99)
GLUCOSE BLDC GLUCOMTR-MCNC: 170 MG/DL — HIGH (ref 70–99)
GLUCOSE BLDC GLUCOMTR-MCNC: 170 MG/DL — HIGH (ref 70–99)
GLUCOSE SERPL-MCNC: 129 MG/DL — HIGH (ref 70–99)
GLUCOSE SERPL-MCNC: 129 MG/DL — HIGH (ref 70–99)
HCT VFR BLD CALC: 34.3 % — LOW (ref 39–50)
HCT VFR BLD CALC: 34.3 % — LOW (ref 39–50)
HGB BLD-MCNC: 11.2 G/DL — LOW (ref 13–17)
HGB BLD-MCNC: 11.2 G/DL — LOW (ref 13–17)
MCHC RBC-ENTMCNC: 29.9 PG — SIGNIFICANT CHANGE UP (ref 27–34)
MCHC RBC-ENTMCNC: 29.9 PG — SIGNIFICANT CHANGE UP (ref 27–34)
MCHC RBC-ENTMCNC: 32.7 GM/DL — SIGNIFICANT CHANGE UP (ref 32–36)
MCHC RBC-ENTMCNC: 32.7 GM/DL — SIGNIFICANT CHANGE UP (ref 32–36)
MCV RBC AUTO: 91.7 FL — SIGNIFICANT CHANGE UP (ref 80–100)
MCV RBC AUTO: 91.7 FL — SIGNIFICANT CHANGE UP (ref 80–100)
NRBC # BLD: 0 /100 WBCS — SIGNIFICANT CHANGE UP (ref 0–0)
NRBC # BLD: 0 /100 WBCS — SIGNIFICANT CHANGE UP (ref 0–0)
PLATELET # BLD AUTO: 332 K/UL — SIGNIFICANT CHANGE UP (ref 150–400)
PLATELET # BLD AUTO: 332 K/UL — SIGNIFICANT CHANGE UP (ref 150–400)
POTASSIUM SERPL-MCNC: 4 MMOL/L — SIGNIFICANT CHANGE UP (ref 3.5–5.3)
POTASSIUM SERPL-MCNC: 4 MMOL/L — SIGNIFICANT CHANGE UP (ref 3.5–5.3)
POTASSIUM SERPL-SCNC: 4 MMOL/L — SIGNIFICANT CHANGE UP (ref 3.5–5.3)
POTASSIUM SERPL-SCNC: 4 MMOL/L — SIGNIFICANT CHANGE UP (ref 3.5–5.3)
PROT SERPL-MCNC: 6.5 G/DL — SIGNIFICANT CHANGE UP (ref 6–8.3)
PROT SERPL-MCNC: 6.5 G/DL — SIGNIFICANT CHANGE UP (ref 6–8.3)
RBC # BLD: 3.74 M/UL — LOW (ref 4.2–5.8)
RBC # BLD: 3.74 M/UL — LOW (ref 4.2–5.8)
RBC # FLD: 14.5 % — SIGNIFICANT CHANGE UP (ref 10.3–14.5)
RBC # FLD: 14.5 % — SIGNIFICANT CHANGE UP (ref 10.3–14.5)
SODIUM SERPL-SCNC: 138 MMOL/L — SIGNIFICANT CHANGE UP (ref 135–145)
SODIUM SERPL-SCNC: 138 MMOL/L — SIGNIFICANT CHANGE UP (ref 135–145)
WBC # BLD: 9.66 K/UL — SIGNIFICANT CHANGE UP (ref 3.8–10.5)
WBC # BLD: 9.66 K/UL — SIGNIFICANT CHANGE UP (ref 3.8–10.5)
WBC # FLD AUTO: 9.66 K/UL — SIGNIFICANT CHANGE UP (ref 3.8–10.5)
WBC # FLD AUTO: 9.66 K/UL — SIGNIFICANT CHANGE UP (ref 3.8–10.5)

## 2023-10-29 PROCEDURE — 99233 SBSQ HOSP IP/OBS HIGH 50: CPT

## 2023-10-29 RX ADMIN — ATORVASTATIN CALCIUM 80 MILLIGRAM(S): 80 TABLET, FILM COATED ORAL at 21:27

## 2023-10-29 RX ADMIN — CHLORHEXIDINE GLUCONATE 1 APPLICATION(S): 213 SOLUTION TOPICAL at 11:38

## 2023-10-29 RX ADMIN — Medication 1: at 12:32

## 2023-10-29 RX ADMIN — PANTOPRAZOLE SODIUM 40 MILLIGRAM(S): 20 TABLET, DELAYED RELEASE ORAL at 04:58

## 2023-10-29 RX ADMIN — Medication 200 MILLIGRAM(S): at 11:37

## 2023-10-29 RX ADMIN — APIXABAN 5 MILLIGRAM(S): 2.5 TABLET, FILM COATED ORAL at 04:58

## 2023-10-29 RX ADMIN — Medication 25 MILLIGRAM(S): at 04:58

## 2023-10-29 RX ADMIN — Medication 25 MILLIGRAM(S): at 21:27

## 2023-10-29 RX ADMIN — SENNA PLUS 2 TABLET(S): 8.6 TABLET ORAL at 21:27

## 2023-10-29 RX ADMIN — APIXABAN 5 MILLIGRAM(S): 2.5 TABLET, FILM COATED ORAL at 17:30

## 2023-10-29 RX ADMIN — CLOPIDOGREL BISULFATE 75 MILLIGRAM(S): 75 TABLET, FILM COATED ORAL at 11:37

## 2023-10-29 NOTE — PROGRESS NOTE ADULT - SUBJECTIVE AND OBJECTIVE BOX
****************************************  Brooklynn Carvajal MD  Available on [employer] Teams  ****************************************  SUBJECTIVE    Patient seen and examined at bedside. No acute events overnight  Reported  Denied HA, CP, SOB, n/v/d/c , fevers, chills    OBJECTIVE     Vital Signs Last 24 Hrs  T(C): 36.9 (29 Oct 2023 00:32), Max: 36.9 (29 Oct 2023 00:32)  T(F): 98.4 (29 Oct 2023 00:32), Max: 98.4 (29 Oct 2023 00:32)  HR: 101 (29 Oct 2023 04:37) (92 - 116)  BP: 110/64 (29 Oct 2023 04:37) (91/55 - 133/72)  BP(mean): --  RR: 18 (29 Oct 2023 04:37) (18 - 18)  SpO2: 95% (29 Oct 2023 04:37) (95% - 96%)    Parameters below as of 29 Oct 2023 04:37  Patient On (Oxygen Delivery Method): room air        10-28-23 @ 07:01  -  10-29-23 @ 07:00  --------------------------------------------------------  IN: 300 mL / OUT: 300 mL / NET: 0 mL      PHYSICAL EXAM:  Constitutional: non-toxic, no distress  HEAD/EYES: anicteric, no conjunctival injection  ENT:  supple, no thrush  Cardiovascular:   normal S1, S2, no murmur, no edema  Respiratory:  clear BS bilaterally, no wheezes, no rales  GI:  soft, non-tender, normal bowel sounds  :  no tavarez, no CVA tenderness  Musculoskeletal:  no synovitis, normal ROM  Neurologic: awake and alert, normal strength, no focal findings  Skin:  no rash, no erythema, no phlebitis  Heme/Onc: no lymphadenopathy   Psychiatric:  awake, alert, appropriate mood          HOSPITAL MEDICATIONS  apixaban 5 milliGRAM(s) Oral two times a day  clopidogrel Tablet 75 milliGRAM(s) Oral daily      metoprolol tartrate 25 milliGRAM(s) Oral three times a day    allopurinol 200 milliGRAM(s) Oral daily  atorvastatin 80 milliGRAM(s) Oral at bedtime  dextrose 50% Injectable 25 Gram(s) IV Push once  dextrose 50% Injectable 12.5 Gram(s) IV Push once  dextrose 50% Injectable 25 Gram(s) IV Push once  dextrose Oral Gel 15 Gram(s) Oral once PRN  glucagon  Injectable 1 milliGRAM(s) IntraMuscular once  insulin lispro (ADMELOG) corrective regimen sliding scale   SubCutaneous at bedtime  insulin lispro (ADMELOG) corrective regimen sliding scale   SubCutaneous three times a day before meals      acetaminophen     Tablet .. 975 milliGRAM(s) Oral every 6 hours PRN  oxyCODONE    IR 5 milliGRAM(s) Oral every 6 hours PRN    pantoprazole    Tablet 40 milliGRAM(s) Oral before breakfast  polyethylene glycol 3350 17 Gram(s) Oral daily  senna 2 Tablet(s) Oral at bedtime        dextrose 5%. 1000 milliLiter(s) IV Continuous <Continuous>  dextrose 5%. 1000 milliLiter(s) IV Continuous <Continuous>  lactated ringers. 1000 milliLiter(s) IV Continuous <Continuous>  lactated ringers. 1000 milliLiter(s) IV Continuous <Continuous>      chlorhexidine 2% Cloths 1 Application(s) Topical daily        LABS                        11.2   9.66  )-----------( 332      ( 29 Oct 2023 07:06 )             34.3       10-29    138  |  104  |  60<H>  ----------------------------<  129<H>  4.0   |  21<L>  |  1.61<H>    Ca    9.0      29 Oct 2023 07:05    TPro  6.5  /  Alb  2.6<L>  /  TBili  0.5  /  DBili  x   /  AST  107<H>  /  ALT  83<H>  /  AlkPhos  241<H>  10-29      Urinalysis Basic - ( 29 Oct 2023 07:05 )    Color: x / Appearance: x / SG: x / pH: x  Gluc: 129 mg/dL / Ketone: x  / Bili: x / Urobili: x   Blood: x / Protein: x / Nitrite: x   Leuk Esterase: x / RBC: x / WBC x   Sq Epi: x / Non Sq Epi: x / Bacteria: x        Follow Up:      RADIOLOGY: ****************************************  Brooklynn Carvajal MD  Available on [employer] Teams  ****************************************  SUBJECTIVE    Patient seen and examined at bedside. No acute events overnight  Denied HA, CP, SOB, n/v/d/c , fevers, chills    OBJECTIVE   Vital Signs Last 24 Hrs  T(C): 36.9 (29 Oct 2023 00:32), Max: 36.9 (29 Oct 2023 00:32)  T(F): 98.4 (29 Oct 2023 00:32), Max: 98.4 (29 Oct 2023 00:32)  HR: 101 (29 Oct 2023 04:37) (92 - 116)  BP: 110/64 (29 Oct 2023 04:37) (91/55 - 133/72)  BP(mean): --  RR: 18 (29 Oct 2023 04:37) (18 - 18)  SpO2: 95% (29 Oct 2023 04:37) (95% - 96%)    Parameters below as of 29 Oct 2023 04:37  Patient On (Oxygen Delivery Method): room air        10-28-23 @ 07:01  -  10-29-23 @ 07:00  --------------------------------------------------------  IN: 300 mL / OUT: 300 mL / NET: 0 mL      PHYSICAL EXAM:  CONSTITUTIONAL: NAD, well-developed  RESPIRATORY: Normal respiratory effort; lungs are clear to auscultation bilaterally  CARDIOVASCULAR: irregularly irregular, normal S1 and S2, no murmur/rub/gallop; trace extremity edema; Peripheral pulses are 2+ bilaterally  ABDOMEN: Nontender to palpation, normoactive bowel sounds, no rebound/guarding; No hepatosplenomegaly  MUSCLOSKELETAL: no clubbing or cyanosis of digits; no joint swelling or tenderness to palpation  PSYCH: A+O to person, place, and time; affect appropriate          HOSPITAL MEDICATIONS  apixaban 5 milliGRAM(s) Oral two times a day  clopidogrel Tablet 75 milliGRAM(s) Oral daily  metoprolol tartrate 25 milliGRAM(s) Oral three times a day  allopurinol 200 milliGRAM(s) Oral daily  atorvastatin 80 milliGRAM(s) Oral at bedtime  dextrose 50% Injectable 25 Gram(s) IV Push once  dextrose 50% Injectable 12.5 Gram(s) IV Push once  dextrose 50% Injectable 25 Gram(s) IV Push once  dextrose Oral Gel 15 Gram(s) Oral once PRN  glucagon  Injectable 1 milliGRAM(s) IntraMuscular once  insulin lispro (ADMELOG) corrective regimen sliding scale   SubCutaneous at bedtime  insulin lispro (ADMELOG) corrective regimen sliding scale   SubCutaneous three times a day before meals      acetaminophen     Tablet .. 975 milliGRAM(s) Oral every 6 hours PRN  oxyCODONE    IR 5 milliGRAM(s) Oral every 6 hours PRN  pantoprazole    Tablet 40 milliGRAM(s) Oral before breakfast  polyethylene glycol 3350 17 Gram(s) Oral daily  senna 2 Tablet(s) Oral at bedtime  dextrose 5%. 1000 milliLiter(s) IV Continuous <Continuous>  dextrose 5%. 1000 milliLiter(s) IV Continuous <Continuous>  lactated ringers. 1000 milliLiter(s) IV Continuous <Continuous>  lactated ringers. 1000 milliLiter(s) IV Continuous <Continuous>      chlorhexidine 2% Cloths 1 Application(s) Topical daily        LABS                        11.2   9.66  )-----------( 332      ( 29 Oct 2023 07:06 )             34.3       10-29    138  |  104  |  60<H>  ----------------------------<  129<H>  4.0   |  21<L>  |  1.61<H>    Ca    9.0      29 Oct 2023 07:05    TPro  6.5  /  Alb  2.6<L>  /  TBili  0.5  /  DBili  x   /  AST  107<H>  /  ALT  83<H>  /  AlkPhos  241<H>  10-29      Urinalysis Basic - ( 29 Oct 2023 07:05 )    Color: x / Appearance: x / SG: x / pH: x  Gluc: 129 mg/dL / Ketone: x  / Bili: x / Urobili: x   Blood: x / Protein: x / Nitrite: x   Leuk Esterase: x / RBC: x / WBC x   Sq Epi: x / Non Sq Epi: x / Bacteria: x        Follow Up:      RADIOLOGY:

## 2023-10-29 NOTE — PROGRESS NOTE ADULT - PROBLEM SELECTOR PLAN 4
was already increasing but worsened after torsemide started  now d/c'ed by cards  Urine lytes resent to calculuate FeUrea given lasix given 10/27  Labs evaluated with worsening Cr and transaminases concerning for low volume state, discussed with Dr. Hayes and s/p start gentle hydration with LR @75 for 10 hours on 10/28/23 with improvement in creatinine   monitor BMP was already increasing but worsened after torsemide started  now d/c'ed by cards  Urine lytes resent to calculuate FeUrea given lasix given 10/27  Labs evaluated with worsening Cr and transaminases concerning for low volume state, discussed with Dr. Hayes and s/p gentle hydration with LR @75 for 10 hours on 10/28/23 with improvement in creatinine; appears relatively euvoloemic today. Assess fluid status, creatinine and need for further fluids tomorrow   monitor BMP

## 2023-10-29 NOTE — PROGRESS NOTE ADULT - PROBLEM SELECTOR PLAN 1
w/ RVR    d/w cardiology  c/w  metoprolol to 25mg TID, required dig x 1 10/27/23  eliquis 5 BID for a/c - monitor cr  stop ASA and continue plavix to avoid triple therapy - d/w cards in detail w/ RVR    d/w cardiology  c/w  metoprolol to 25mg TID, required dig x 1 10/27/23  eliquis 5 BID for a/c - monitor cr  stopped ASA and continued plavix to avoid triple therapy - d/w cards in detail

## 2023-10-29 NOTE — PROGRESS NOTE ADULT - SUBJECTIVE AND OBJECTIVE BOX
CHIEF COMPLAINT  Chest Pain    HISTORY OF PRESENT ILLNESS  NANDO HERNANDEZ is a 78y Male who presents with a chief complaint of chest pain    No acute events. No complaints.    REVIEW OF SYSTEMS  A complete review of systems was performed; negative except per HPI    PHYSICAL EXAM  Vital Signs Last 24 Hrs  T(C): 36.9 (29 Oct 2023 00:32), Max: 36.9 (29 Oct 2023 00:32)  T(F): 98.4 (29 Oct 2023 00:32), Max: 98.4 (29 Oct 2023 00:32)  HR: 101 (29 Oct 2023 04:37) (92 - 116)  BP: 110/64 (29 Oct 2023 04:37) (91/55 - 133/72)  BP(mean): --  RR: 18 (29 Oct 2023 04:37) (18 - 18)  SpO2: 95% (29 Oct 2023 04:37) (95% - 96%)    Parameters below as of 29 Oct 2023 04:37  Patient On (Oxygen Delivery Method): room air    Constitutional: alert, awake, in no acute distress  Eyes: PERRL, EOMI  HEENT: normocephalic, atraumatic  Neck: supple, non-tender  Cardiovascular: normal perfusion, tachycardic  Respiratory: normal respiratory efforts; no increased use of accessory muscles  Gastrointestinal: soft, non-tender  Musculoskeletal: normal range of motion, no deformities noted  Neurological: alert, CN II to XI grossly intact  Skin: warm, dry    LABORATORY DATA                                11.2   9.66  )-----------( 332      ( 29 Oct 2023 07:06 )             34.3     10-29    138  |  104  |  60<H>  ----------------------------<  129<H>  4.0   |  21<L>  |  1.61<H>    Ca    9.0      29 Oct 2023 07:05    TPro  6.5  /  Alb  2.6<L>  /  TBili  0.5  /  DBili  x   /  AST  107<H>  /  ALT  83<H>  /  AlkPhos  241<H>  10-29

## 2023-10-29 NOTE — PROVIDER CONTACT NOTE (MEDICATION) - SITUATION
Pt has a BP of 104/64 and metoprolol held for Systolic BP less than 110. When VS were input into HER, MEWS score of 7 generated.

## 2023-10-29 NOTE — PROGRESS NOTE ADULT - SUBJECTIVE AND OBJECTIVE BOX
DATE OF SERVICE: 10-29-23 @ 15:05    Patient is a 78y old  Male who presents with a chief complaint of Chest pain (29 Oct 2023 10:29)      INTERVAL HISTORY:     REVIEW OF SYSTEMS:  CONSTITUTIONAL: No fever, weight loss, + fatigue  EYES: No eye pain, visual disturbances, or discharge  ENMT:  No difficulty hearing, tinnitus, vertigo; No sinus or throat pain  RESPIRATORY: No SOB. No cough, wheezing, chills or hemoptysis  CARDIOVASCULAR: +chest pain, No palpitations or dizziness, + leg swelling  GASTROINTESTINAL: +abdominal pain. No nausea, vomiting, or hematemesis; No diarrhea. + constipation. No melena or hematochezia.  GENITOURINARY: No dysuria, frequency, hematuria, or incontinence  NEUROLOGICAL: No headaches, memory loss, loss of strength, numbness, or tremors  SKIN: No itching, burning, rashes, or lesions   MUSCULOSKELETAL: No joint pain or swelling; No muscle, back pain, + LE pain  HEME/LYMPH: No easy bruising, or bleeding gums      	  MEDICATIONS:  metoprolol tartrate 25 milliGRAM(s) Oral three times a day        PHYSICAL EXAM:  T(C): 36.3 (10-29-23 @ 12:57), Max: 36.9 (10-29-23 @ 00:32)  HR: 111 (10-29-23 @ 14:26) (92 - 116)  BP: 104/64 (10-29-23 @ 14:26) (91/55 - 133/72)  RR: 18 (10-29-23 @ 12:57) (18 - 18)  SpO2: 96% (10-29-23 @ 12:57) (95% - 96%)  Wt(kg): --  I&O's Summary    28 Oct 2023 07:01  -  29 Oct 2023 07:00  --------------------------------------------------------  IN: 300 mL / OUT: 300 mL / NET: 0 mL          CONSTITUTIONAL: Well-groomed, in no apparent distress  EYES: No conjunctival or scleral injection, non-icteric; PERRLA and symmetric  ENMT: No external nasal lesions; nasal mucosa not inflamed; no pharyngeal injection or exudates, oral mucosa with moist membranes  RESPIRATORY: Breathing comfortably; lungs CTA without wheeze/rhonchi/rales  CARDIOVASCULAR: Tachycardic, irregular rhythm, +S1S2, no M/G/R; pedal pulses full and symmetric; trace lower extremity edema; lower chest pain reproducible with palpation.  GASTROINTESTINAL: Mild diffuse tenderness, No palpable masses, +BS throughout, no rebound/guarding; no hepatosplenomegaly; no hernia palpated                              11.2   9.66  )-----------( 332      ( 29 Oct 2023 07:06 )             34.3     10-29    138  |  104  |  60<H>  ----------------------------<  129<H>  4.0   |  21<L>  |  1.61<H>    Ca    9.0      29 Oct 2023 07:05    TPro  6.5  /  Alb  2.6<L>  /  TBili  0.5  /  DBili  x   /  AST  107<H>  /  ALT  83<H>  /  AlkPhos  241<H>  10-29        Labs personally reviewed      ASSESSMENT/PLAN: 	    78 M with hx of CAD s/p NURIA to distal LAD in 6/2023, HTN, HLD, EtOH disorder, T2DM, spinal surgeries, neuroendocrine tumor on Lutathera (first treatment on Friday 10/20 at [facility] [place] presenting with fatigue and chest pain since Friday. Patient reports severe fatigue starting Friday after his first treatment with Lutathera. Pt also reported mid-left lower chest pain described as pressure. No radiation, no aggravating or alleviating factors. Pt has never felt this type of chest pain before. Denies palpitations, diaphoresis, dyspnea, nausea, vomiting. Pt has diffuse abdominal pain. Also with constipation; last BM several days ago. No other acute complaints.      Problem/Plan - 1:  ·  Problem: Chest Pain.   ·  Plan: Location described as LUQ abdomen with pain elicited with palpation, appears noncardiac   - EKG SR with PACs, no ischemic characteristics  - Trop 72-->83--> 81/ CKMB 1.9  - 10/26 reports CP, trop increased to 110 --> 133 likely 2/2 demand i/s/o ST and fever. Trend to peak.   - Mildly elevated LFTS and Lipase noted  - Hx of recent PCI  - Repeat TTE unchanged   - CP possibly 2/2 lutathera with known S/E of abdominal discomfort  - 10/27 CP improved      Problem/Plan - 2:  ·  Problem: Shortness of Breath  ·  Plan: ECG non-ischemic  - TTE wnl.  - BNP 2572. Repeat 4171.  - CT neg for PE, no pulm edema noted  - s/p Lasix 20 IVP once. cont to hold further diuretics given RAZIA.   -RAZIA improved previous 2.04 now 1.61      Problem/Plan - 3:  ·  Problem: CAD.   ·  Plan: Recent PCI to pLAD in June 2023  - ECG non-ischemic  - Trop/CKMB as noted above  - c/w Plavix and statin, d/c ASA given starting Eliquis  - TTE unchanged     Problem/Plan - 4:  ·  Problem: RAZIA  ·  Plan: Likely dry, will gentle hydrate      Problem/Plan - 5:  ·  Problem: New Onset Atrial Fibrillation  - As per tele, converted to rapid AF rates 120-140bpm  - Check TSH  - Will increase Toprol to 25mg PO TID, uptitrate to 50mg BID if BP tolerates  - Will consider DCCV if remains in AF RVR  - [id number] of 2. Recommend AC.  - Eliquis 5mg BID       ESTEFANI Velez DO [facility]  Cardiovascular Medicine  [address] [place] [address], [address]  Available through call or text on [employer] TEAMs  Office: [phone]   DATE OF SERVICE: 10-29-23 @ 15:05    Patient is a 78y old  Male who presents with a chief complaint of Chest pain (29 Oct 2023 10:29)      INTERVAL HISTORY:     REVIEW OF SYSTEMS:  CONSTITUTIONAL: No fever, weight loss, + fatigue  EYES: No eye pain, visual disturbances, or discharge  ENMT:  No difficulty hearing, tinnitus, vertigo; No sinus or throat pain  RESPIRATORY: No SOB. No cough, wheezing, chills or hemoptysis  CARDIOVASCULAR: +chest pain, No palpitations or dizziness, + leg swelling  GASTROINTESTINAL: +abdominal pain. No nausea, vomiting, or hematemesis; No diarrhea. + constipation. No melena or hematochezia.  GENITOURINARY: No dysuria, frequency, hematuria, or incontinence  NEUROLOGICAL: No headaches, memory loss, loss of strength, numbness, or tremors  SKIN: No itching, burning, rashes, or lesions   MUSCULOSKELETAL: No joint pain or swelling; No muscle, back pain, + LE pain  HEME/LYMPH: No easy bruising, or bleeding gums    Tele reviewed by me: AFIB HR 99  	  MEDICATIONS:  metoprolol tartrate 25 milliGRAM(s) Oral three times a day        PHYSICAL EXAM:  T(C): 36.3 (10-29-23 @ 12:57), Max: 36.9 (10-29-23 @ 00:32)  HR: 111 (10-29-23 @ 14:26) (92 - 116)  BP: 104/64 (10-29-23 @ 14:26) (91/55 - 133/72)  RR: 18 (10-29-23 @ 12:57) (18 - 18)  SpO2: 96% (10-29-23 @ 12:57) (95% - 96%)  Wt(kg): --  I&O's Summary    28 Oct 2023 07:01  -  29 Oct 2023 07:00  --------------------------------------------------------  IN: 300 mL / OUT: 300 mL / NET: 0 mL          CONSTITUTIONAL: Well-groomed, in no apparent distress  EYES: No conjunctival or scleral injection, non-icteric; PERRLA and symmetric  ENMT: No external nasal lesions; nasal mucosa not inflamed; no pharyngeal injection or exudates, oral mucosa with moist membranes  RESPIRATORY: Breathing comfortably; lungs CTA without wheeze/rhonchi/rales  CARDIOVASCULAR: Tachycardic, irregular rhythm, +S1S2, no M/G/R; pedal pulses full and symmetric; trace lower extremity edema; lower chest pain reproducible with palpation.  GASTROINTESTINAL: Mild diffuse tenderness, No palpable masses, +BS throughout, no rebound/guarding; no hepatosplenomegaly; no hernia palpated                              11.2   9.66  )-----------( 332      ( 29 Oct 2023 07:06 )             34.3     10-29    138  |  104  |  60<H>  ----------------------------<  129<H>  4.0   |  21<L>  |  1.61<H>    Ca    9.0      29 Oct 2023 07:05    TPro  6.5  /  Alb  2.6<L>  /  TBili  0.5  /  DBili  x   /  AST  107<H>  /  ALT  83<H>  /  AlkPhos  241<H>  10-29        Labs personally reviewed      ASSESSMENT/PLAN: 	    78 M with hx of CAD s/p NURIA to distal LAD in 6/2023, HTN, HLD, EtOH disorder, T2DM, spinal surgeries, neuroendocrine tumor on Lutathera (first treatment on Friday 10/20 at [facility] [place]), presenting with fatigue and chest pain since Friday. Patient reports severe fatigue starting Friday after his first treatment with Lutathera. Pt also reported mid-left lower chest pain described as pressure. No radiation, no aggravating or alleviating factors. Pt has never felt this type of chest pain before. Denies palpitations, diaphoresis, dyspnea, nausea, vomiting. Pt has diffuse abdominal pain. Also with constipation; last BM several days ago. No other acute complaints.      Problem/Plan - 1:  ·  Problem: Chest Pain.   ·  Plan: Location described as LUQ abdomen with pain elicited with palpation, appears noncardiac   - EKG SR with PACs, no ischemic characteristics  - Trop 72-->83--> 81/ CKMB 1.9  - 10/26 reports CP, trop increased to 110 --> 133 likely 2/2 demand i/s/o ST and fever. Trend to peak.   - Mildly elevated LFTS and Lipase noted  - Hx of recent PCI  - Repeat TTE unchanged   - CP possibly 2/2 lutathera with known S/E of abdominal discomfort  - 10/27 CP improved      Problem/Plan - 2:  ·  Problem: Shortness of Breath  ·  Plan: ECG non-ischemic  - TTE wnl.  - BNP 2572. Repeat 4171.  - CT neg for PE, no pulm edema noted  - s/p Lasix 20 IVP once. cont to hold further diuretics given RAZIA.   -RAZIA improved previous 2.04 now 1.61      Problem/Plan - 3:  ·  Problem: CAD.   ·  Plan: Recent PCI to pLAD in June 2023  - ECG non-ischemic  - Trop/CKMB as noted above  - c/w Plavix and statin, d/c ASA given starting Eliquis  - TTE unchanged     Problem/Plan - 4:  ·  Problem: RAZIA  ·  Plan: Likely dry, will gentle hydrate      Problem/Plan - 5:  ·  Problem: New Onset Atrial Fibrillation  - As per tele, converted to rapid AF rates 120-140bpm  - Check TSH  - Will increase Toprol to 25mg PO TID, uptitrate to 50mg BID if BP tolerates  - Will consider DCCV if remains in AF RVR  - [id number] of 2. Recommend AC.  - Eliquis 5mg BID  -Patient agrees for Cardio Version risk vs benefits explained       ESTEFANI Velez,  [facility]  Cardiovascular Medicine  [address] [place] [address], [address]  Available through call or text on [employer] TEAMs  Office: [phone]   DATE OF SERVICE: 10-29-23 @ 15:05    Patient is a 78y old  Male who presents with a chief complaint of Chest pain (29 Oct 2023 10:29)      INTERVAL HISTORY:     REVIEW OF SYSTEMS:  CONSTITUTIONAL: No fever, weight loss, + fatigue  EYES: No eye pain, visual disturbances, or discharge  ENMT:  No difficulty hearing, tinnitus, vertigo; No sinus or throat pain  RESPIRATORY: No SOB. No cough, wheezing, chills or hemoptysis  CARDIOVASCULAR: +chest pain, No palpitations or dizziness, + leg swelling  GASTROINTESTINAL: +abdominal pain. No nausea, vomiting, or hematemesis; No diarrhea. + constipation. No melena or hematochezia.  GENITOURINARY: No dysuria, frequency, hematuria, or incontinence  NEUROLOGICAL: No headaches, memory loss, loss of strength, numbness, or tremors  SKIN: No itching, burning, rashes, or lesions   MUSCULOSKELETAL: No joint pain ; No muscle, back pain, + LE pain and swelling  HEME/LYMPH: No easy bruising, or bleeding gums    Tele reviewed by me: AFIB HR 99  	  MEDICATIONS:  metoprolol tartrate 25 milliGRAM(s) Oral three times a day        PHYSICAL EXAM:  T(C): 36.3 (10-29-23 @ 12:57), Max: 36.9 (10-29-23 @ 00:32)  HR: 111 (10-29-23 @ 14:26) (92 - 116)  BP: 104/64 (10-29-23 @ 14:26) (91/55 - 133/72)  RR: 18 (10-29-23 @ 12:57) (18 - 18)  SpO2: 96% (10-29-23 @ 12:57) (95% - 96%)  Wt(kg): --  I&O's Summary    28 Oct 2023 07:01  -  29 Oct 2023 07:00  --------------------------------------------------------  IN: 300 mL / OUT: 300 mL / NET: 0 mL          CONSTITUTIONAL: Well-groomed, in no apparent distress  EYES: No conjunctival or scleral injection, non-icteric; PERRLA and symmetric  ENMT: No external nasal lesions; nasal mucosa not inflamed; no pharyngeal injection or exudates, oral mucosa with moist membranes  RESPIRATORY: Breathing comfortably; lungs CTA without wheeze/rhonchi/rales  CARDIOVASCULAR: Tachycardic, irregular rhythm, +S1S2, no M/G/R; pedal pulses full and symmetric; trace lower extremity edema; lower chest pain reproducible with palpation.  GASTROINTESTINAL: Mild diffuse tenderness, No palpable masses, +BS throughout, no rebound/guarding; no hepatosplenomegaly; no hernia palpated                              11.2   9.66  )-----------( 332      ( 29 Oct 2023 07:06 )             34.3     10-29    138  |  104  |  60<H>  ----------------------------<  129<H>  4.0   |  21<L>  |  1.61<H>    Ca    9.0      29 Oct 2023 07:05    TPro  6.5  /  Alb  2.6<L>  /  TBili  0.5  /  DBili  x   /  AST  107<H>  /  ALT  83<H>  /  AlkPhos  241<H>  10-29        Labs personally reviewed      ASSESSMENT/PLAN: 	    78 M with hx of CAD s/p NURIA to distal LAD in 6/2023, HTN, HLD, EtOH disorder, T2DM, spinal surgeries, neuroendocrine tumor on Lutathera (first treatment on Friday 10/20 at [facility] [place] presenting with fatigue and chest pain since Friday. Patient reports severe fatigue starting Friday after his first treatment with Lutathera. Pt also reported mid-left lower chest pain described as pressure. No radiation, no aggravating or alleviating factors. Pt has never felt this type of chest pain before. Denies palpitations, diaphoresis, dyspnea, nausea, vomiting. Pt has diffuse abdominal pain. Also with constipation; last BM several days ago. No other acute complaints.      Problem/Plan - 1:  ·  Problem: Chest Pain.   ·  Plan: Location described as LUQ abdomen with pain elicited with palpation, appears noncardiac   - EKG SR with PACs, no ischemic characteristics  - Trop 72-->83--> 81/ CKMB 1.9  - 10/26 reports CP, trop increased to 110 --> 133 likely 2/2 demand i/s/o ST and fever. Trend to peak.   - Mildly elevated LFTS and Lipase noted  - Hx of recent PCI  - Repeat TTE unchanged   - CP possibly 2/2 lutathera with known S/E of abdominal discomfort  - 10/27 CP improved      Problem/Plan - 2:  ·  Problem: Shortness of Breath  ·  Plan: ECG non-ischemic  - TTE wnl.  - BNP 2572. Repeat 4171.  - CT neg for PE, no pulm edema noted  - s/p Lasix 20 IVP once. cont to hold further diuretics given RAZIA.   -RAZIA improved previous 2.04 now 1.61      Problem/Plan - 3:  ·  Problem: CAD.   ·  Plan: Recent PCI to pLAD in June 2023  - ECG non-ischemic  - Trop/CKMB as noted above  - c/w Plavix and statin, d/c ASA given starting Eliquis  - TTE unchanged     Problem/Plan - 4:  ·  Problem: RAZIA  ·  Plan: Likely dry, will gentle hydrate      Problem/Plan - 5:  ·  Problem: New Onset Atrial Fibrillation  - As per tele, converted to rapid AF rates 120-140bpm  - Check TSH  - Will increase Toprol to 25mg PO TID, uptitrate to 50mg BID if BP tolerates  - Will consider DCCV if remains in AF RVR  - [id number] of 2. Recommend AC.  - Eliquis 5mg BID  -Patient agrees for Cardio Version risk vs benefits explained       ESTEFANI Velez,  [facility]  Cardiovascular Medicine  [address] [place] [address], [address]  Available through call or text on [employer] TEAMs  Office: [phone]   DATE OF SERVICE: 10-29-23 @ 15:05    Patient is a 78y old  Male who presents with a chief complaint of Chest pain (29 Oct 2023 10:29)      INTERVAL HISTORY:  Tele with AF     REVIEW OF SYSTEMS:  CONSTITUTIONAL: No fever, weight loss, + fatigue  EYES: No eye pain, visual disturbances, or discharge  ENMT:  No difficulty hearing, tinnitus, vertigo; No sinus or throat pain  RESPIRATORY: No SOB. No cough, wheezing, chills or hemoptysis  CARDIOVASCULAR: +chest pain, No palpitations or dizziness, + leg swelling  GASTROINTESTINAL: +abdominal pain. No nausea, vomiting, or hematemesis; No diarrhea. + constipation. No melena or hematochezia.  GENITOURINARY: No dysuria, frequency, hematuria, or incontinence  NEUROLOGICAL: No headaches, memory loss, loss of strength, numbness, or tremors  SKIN: No itching, burning, rashes, or lesions   MUSCULOSKELETAL: No joint pain ; No muscle, back pain, + LE pain and swelling  HEME/LYMPH: No easy bruising, or bleeding gums    Tele reviewed by me: RABIAIB HR 99  	  MEDICATIONS:  metoprolol tartrate 25 milliGRAM(s) Oral three times a day        PHYSICAL EXAM:  T(C): 36.3 (10-29-23 @ 12:57), Max: 36.9 (10-29-23 @ 00:32)  HR: 111 (10-29-23 @ 14:26) (92 - 116)  BP: 104/64 (10-29-23 @ 14:26) (91/55 - 133/72)  RR: 18 (10-29-23 @ 12:57) (18 - 18)  SpO2: 96% (10-29-23 @ 12:57) (95% - 96%)  Wt(kg): --  I&O's Summary    28 Oct 2023 07:01  -  29 Oct 2023 07:00  --------------------------------------------------------  IN: 300 mL / OUT: 300 mL / NET: 0 mL          CONSTITUTIONAL: Well-groomed, in no apparent distress  EYES: No conjunctival or scleral injection, non-icteric; PERRLA and symmetric  ENMT: No external nasal lesions; nasal mucosa not inflamed; no pharyngeal injection or exudates, oral mucosa with moist membranes  RESPIRATORY: Breathing comfortably; lungs CTA without wheeze/rhonchi/rales  CARDIOVASCULAR: Tachycardic, irregular rhythm, +S1S2, no M/G/R; pedal pulses full and symmetric; trace lower extremity edema; lower chest pain reproducible with palpation.  GASTROINTESTINAL: Mild diffuse tenderness, No palpable masses, +BS throughout, no rebound/guarding; no hepatosplenomegaly; no hernia palpated                              11.2   9.66  )-----------( 332      ( 29 Oct 2023 07:06 )             34.3     10-29    138  |  104  |  60<H>  ----------------------------<  129<H>  4.0   |  21<L>  |  1.61<H>    Ca    9.0      29 Oct 2023 07:05    TPro  6.5  /  Alb  2.6<L>  /  TBili  0.5  /  DBili  x   /  AST  107<H>  /  ALT  83<H>  /  AlkPhos  241<H>  10-29        Labs personally reviewed      ASSESSMENT/PLAN: 	    78 M with hx of CAD s/p NURIA to distal LAD in 6/2023, HTN, HLD, EtOH disorder, T2DM, spinal surgeries, neuroendocrine tumor on Lutathera (first treatment on Friday 10/20 at [facility] [place] presenting with fatigue and chest pain since Friday. Patient reports severe fatigue starting Friday after his first treatment with Lutathera. Pt also reported mid-left lower chest pain described as pressure. No radiation, no aggravating or alleviating factors. Pt has never felt this type of chest pain before. Denies palpitations, diaphoresis, dyspnea, nausea, vomiting. Pt has diffuse abdominal pain. Also with constipation; last BM several days ago. No other acute complaints.      Problem/Plan - 1:  ·  Problem: Chest Pain.   ·  Plan: Location described as LUQ abdomen with pain elicited with palpation, appears noncardiac   - EKG SR with PACs, no ischemic characteristics  - Trop 72-->83--> 81/ CKMB 1.9  - 10/26 reports CP, trop increased to 110 --> 133 likely 2/2 demand i/s/o ST and fever. Trend to peak.   - Mildly elevated LFTS and Lipase noted  - Hx of recent PCI  - Repeat TTE unchanged   - CP possibly 2/2 lutathera with known S/E of abdominal discomfort  - 10/27 CP improved      Problem/Plan - 2:  ·  Problem: Shortness of Breath  ·  Plan: ECG non-ischemic  - TTE wnl.  - BNP 2572. Repeat 4171.  - CT neg for PE, no pulm edema noted  - s/p Lasix 20 IVP once. cont to hold further diuretics given RAZIA.   -RAZIA improved previous 2.04 now 1.61      Problem/Plan - 3:  ·  Problem: CAD.   ·  Plan: Recent PCI to pLAD in June 2023  - ECG non-ischemic  - Trop/CKMB as noted above  - c/w Plavix and statin, d/c ASA given starting Eliquis  - TTE unchanged     Problem/Plan - 4:  ·  Problem: RAZIA  ·  Plan: Likely dry, will gentle hydrate      Problem/Plan - 5:  ·  Problem: New Onset Atrial Fibrillation  - As per tele, converted to rapid AF rates 120-140bpm  - Check TSH  - Will increase Toprol to 25mg PO TID, uptitrate to 50mg BID if BP tolerates  - Will consider DCCV if remains in AF RVR  - [id number] of 2. Recommend AC.  - Eliquis 5mg BID  -Will consult EP for DCCV, Patient agreeable        ESTEFANI Velez DO [facility]  Cardiovascular Medicine  [address] [place] [address], [address]  Available through call or text on [employer] TEAMs  Office: [phone]

## 2023-10-29 NOTE — PROGRESS NOTE ADULT - ASSESSMENT
NANDO HERNANDEZ is a 78y Male who presents with a chief complaint of chest pain    Metastatic Pancreatic Neuroendocrine Tumor  ·	Patient follows with Dr. Sophia Prasad, [facility].  ·	Recent progression of disease; last received Lutetium Edith-177 LUTATHERA on October 20th.  ·	No systemic therapy while inpatient or during rehabilitation    Anemia  ·	In the setting of inflammation, active malignancy  ·	Hemoglobin has been stable.  ·	Monitor and maintain HGB > 7    Chest Pain  Dyspnea  ·	Possibly due to abdominal pain which can occur with LUTATHERA  ·	Echocardiogram with no obvious abnormalities. EKG non-ischemic  ·	Diuretics on hold due to kidney injury.  ·	Follow-up cardiology recommendations    Acute Kidney Injury  ·	In the setting of recent diuresis.  ·	Creatinine improved to 1.61    Atrial Fibrillation  ·	Patient is now on apixaban and clopidogrel.  ·	Also on rate control with metoprolol  ·	Follow-up cardiology recommendations; consideration of DCCV.    Will continue to follow.    Sedrick Tejada MD  Hematology/Oncology  O: [phone]

## 2023-10-29 NOTE — PROGRESS NOTE ADULT - PROBLEM SELECTOR PLAN 2
concern for treatment reaction vs progression of disease,     less likely but infectious w/u pending, monitor off abx  onc eval appreciated  pain control- palliative eval, d/w pt he agrees but declined pain meds after discussion    fevers resolved   no DVT on doppler

## 2023-10-29 NOTE — PROVIDER CONTACT NOTE (MEDICATION) - BACKGROUND
Pt has CAD s/p NURIA to distal LAD, HTN, HLD, DM, A .fib who came in with fevers and SIRS due to unknown etiology.

## 2023-10-30 ENCOUNTER — TRANSCRIPTION ENCOUNTER (OUTPATIENT)
Age: 78
End: 2023-10-30

## 2023-10-30 LAB
ALBUMIN SERPL ELPH-MCNC: 2.4 G/DL — LOW (ref 3.3–5)
ALBUMIN SERPL ELPH-MCNC: 2.4 G/DL — LOW (ref 3.3–5)
ALP SERPL-CCNC: 237 U/L — HIGH (ref 40–120)
ALP SERPL-CCNC: 237 U/L — HIGH (ref 40–120)
ALT FLD-CCNC: 89 U/L — HIGH (ref 10–45)
ALT FLD-CCNC: 89 U/L — HIGH (ref 10–45)
ANION GAP SERPL CALC-SCNC: 13 MMOL/L — SIGNIFICANT CHANGE UP (ref 5–17)
ANION GAP SERPL CALC-SCNC: 13 MMOL/L — SIGNIFICANT CHANGE UP (ref 5–17)
AST SERPL-CCNC: 109 U/L — HIGH (ref 10–40)
AST SERPL-CCNC: 109 U/L — HIGH (ref 10–40)
BASOPHILS # BLD AUTO: 0.03 K/UL — SIGNIFICANT CHANGE UP (ref 0–0.2)
BASOPHILS # BLD AUTO: 0.03 K/UL — SIGNIFICANT CHANGE UP (ref 0–0.2)
BASOPHILS NFR BLD AUTO: 0.3 % — SIGNIFICANT CHANGE UP (ref 0–2)
BASOPHILS NFR BLD AUTO: 0.3 % — SIGNIFICANT CHANGE UP (ref 0–2)
BILIRUB SERPL-MCNC: 0.5 MG/DL — SIGNIFICANT CHANGE UP (ref 0.2–1.2)
BILIRUB SERPL-MCNC: 0.5 MG/DL — SIGNIFICANT CHANGE UP (ref 0.2–1.2)
BUN SERPL-MCNC: 52 MG/DL — HIGH (ref 7–23)
BUN SERPL-MCNC: 52 MG/DL — HIGH (ref 7–23)
CALCIUM SERPL-MCNC: 9 MG/DL — SIGNIFICANT CHANGE UP (ref 8.4–10.5)
CALCIUM SERPL-MCNC: 9 MG/DL — SIGNIFICANT CHANGE UP (ref 8.4–10.5)
CHLORIDE SERPL-SCNC: 105 MMOL/L — SIGNIFICANT CHANGE UP (ref 96–108)
CHLORIDE SERPL-SCNC: 105 MMOL/L — SIGNIFICANT CHANGE UP (ref 96–108)
CO2 SERPL-SCNC: 20 MMOL/L — LOW (ref 22–31)
CO2 SERPL-SCNC: 20 MMOL/L — LOW (ref 22–31)
CREAT SERPL-MCNC: 1.4 MG/DL — HIGH (ref 0.5–1.3)
CREAT SERPL-MCNC: 1.4 MG/DL — HIGH (ref 0.5–1.3)
EGFR: 51 ML/MIN/1.73M2 — LOW
EGFR: 51 ML/MIN/1.73M2 — LOW
EOSINOPHIL # BLD AUTO: 0.19 K/UL — SIGNIFICANT CHANGE UP (ref 0–0.5)
EOSINOPHIL # BLD AUTO: 0.19 K/UL — SIGNIFICANT CHANGE UP (ref 0–0.5)
EOSINOPHIL NFR BLD AUTO: 1.7 % — SIGNIFICANT CHANGE UP (ref 0–6)
EOSINOPHIL NFR BLD AUTO: 1.7 % — SIGNIFICANT CHANGE UP (ref 0–6)
GLUCOSE BLDC GLUCOMTR-MCNC: 108 MG/DL — HIGH (ref 70–99)
GLUCOSE BLDC GLUCOMTR-MCNC: 108 MG/DL — HIGH (ref 70–99)
GLUCOSE BLDC GLUCOMTR-MCNC: 127 MG/DL — HIGH (ref 70–99)
GLUCOSE BLDC GLUCOMTR-MCNC: 127 MG/DL — HIGH (ref 70–99)
GLUCOSE BLDC GLUCOMTR-MCNC: 135 MG/DL — HIGH (ref 70–99)
GLUCOSE BLDC GLUCOMTR-MCNC: 135 MG/DL — HIGH (ref 70–99)
GLUCOSE BLDC GLUCOMTR-MCNC: 155 MG/DL — HIGH (ref 70–99)
GLUCOSE BLDC GLUCOMTR-MCNC: 155 MG/DL — HIGH (ref 70–99)
GLUCOSE SERPL-MCNC: 121 MG/DL — HIGH (ref 70–99)
GLUCOSE SERPL-MCNC: 121 MG/DL — HIGH (ref 70–99)
HCT VFR BLD CALC: 31.9 % — LOW (ref 39–50)
HCT VFR BLD CALC: 31.9 % — LOW (ref 39–50)
HGB BLD-MCNC: 10.4 G/DL — LOW (ref 13–17)
HGB BLD-MCNC: 10.4 G/DL — LOW (ref 13–17)
IMM GRANULOCYTES NFR BLD AUTO: 1.5 % — HIGH (ref 0–0.9)
IMM GRANULOCYTES NFR BLD AUTO: 1.5 % — HIGH (ref 0–0.9)
LYMPHOCYTES # BLD AUTO: 0.73 K/UL — LOW (ref 1–3.3)
LYMPHOCYTES # BLD AUTO: 0.73 K/UL — LOW (ref 1–3.3)
LYMPHOCYTES # BLD AUTO: 6.5 % — LOW (ref 13–44)
LYMPHOCYTES # BLD AUTO: 6.5 % — LOW (ref 13–44)
MCHC RBC-ENTMCNC: 29.7 PG — SIGNIFICANT CHANGE UP (ref 27–34)
MCHC RBC-ENTMCNC: 29.7 PG — SIGNIFICANT CHANGE UP (ref 27–34)
MCHC RBC-ENTMCNC: 32.6 GM/DL — SIGNIFICANT CHANGE UP (ref 32–36)
MCHC RBC-ENTMCNC: 32.6 GM/DL — SIGNIFICANT CHANGE UP (ref 32–36)
MCV RBC AUTO: 91.1 FL — SIGNIFICANT CHANGE UP (ref 80–100)
MCV RBC AUTO: 91.1 FL — SIGNIFICANT CHANGE UP (ref 80–100)
MONOCYTES # BLD AUTO: 1.02 K/UL — HIGH (ref 0–0.9)
MONOCYTES # BLD AUTO: 1.02 K/UL — HIGH (ref 0–0.9)
MONOCYTES NFR BLD AUTO: 9 % — SIGNIFICANT CHANGE UP (ref 2–14)
MONOCYTES NFR BLD AUTO: 9 % — SIGNIFICANT CHANGE UP (ref 2–14)
NEUTROPHILS # BLD AUTO: 9.15 K/UL — HIGH (ref 1.8–7.4)
NEUTROPHILS # BLD AUTO: 9.15 K/UL — HIGH (ref 1.8–7.4)
NEUTROPHILS NFR BLD AUTO: 81 % — HIGH (ref 43–77)
NEUTROPHILS NFR BLD AUTO: 81 % — HIGH (ref 43–77)
NRBC # BLD: 0 /100 WBCS — SIGNIFICANT CHANGE UP (ref 0–0)
NRBC # BLD: 0 /100 WBCS — SIGNIFICANT CHANGE UP (ref 0–0)
PLATELET # BLD AUTO: 354 K/UL — SIGNIFICANT CHANGE UP (ref 150–400)
PLATELET # BLD AUTO: 354 K/UL — SIGNIFICANT CHANGE UP (ref 150–400)
POTASSIUM SERPL-MCNC: 3.9 MMOL/L — SIGNIFICANT CHANGE UP (ref 3.5–5.3)
POTASSIUM SERPL-MCNC: 3.9 MMOL/L — SIGNIFICANT CHANGE UP (ref 3.5–5.3)
POTASSIUM SERPL-SCNC: 3.9 MMOL/L — SIGNIFICANT CHANGE UP (ref 3.5–5.3)
POTASSIUM SERPL-SCNC: 3.9 MMOL/L — SIGNIFICANT CHANGE UP (ref 3.5–5.3)
PROT SERPL-MCNC: 6.1 G/DL — SIGNIFICANT CHANGE UP (ref 6–8.3)
PROT SERPL-MCNC: 6.1 G/DL — SIGNIFICANT CHANGE UP (ref 6–8.3)
RBC # BLD: 3.5 M/UL — LOW (ref 4.2–5.8)
RBC # BLD: 3.5 M/UL — LOW (ref 4.2–5.8)
RBC # FLD: 14.6 % — HIGH (ref 10.3–14.5)
RBC # FLD: 14.6 % — HIGH (ref 10.3–14.5)
SODIUM SERPL-SCNC: 138 MMOL/L — SIGNIFICANT CHANGE UP (ref 135–145)
SODIUM SERPL-SCNC: 138 MMOL/L — SIGNIFICANT CHANGE UP (ref 135–145)
WBC # BLD: 11.29 K/UL — HIGH (ref 3.8–10.5)
WBC # BLD: 11.29 K/UL — HIGH (ref 3.8–10.5)
WBC # FLD AUTO: 11.29 K/UL — HIGH (ref 3.8–10.5)
WBC # FLD AUTO: 11.29 K/UL — HIGH (ref 3.8–10.5)

## 2023-10-30 PROCEDURE — 99223 1ST HOSP IP/OBS HIGH 75: CPT | Mod: FS

## 2023-10-30 PROCEDURE — 99232 SBSQ HOSP IP/OBS MODERATE 35: CPT

## 2023-10-30 RX ADMIN — POLYETHYLENE GLYCOL 3350 17 GRAM(S): 17 POWDER, FOR SOLUTION ORAL at 11:55

## 2023-10-30 RX ADMIN — CHLORHEXIDINE GLUCONATE 1 APPLICATION(S): 213 SOLUTION TOPICAL at 11:57

## 2023-10-30 RX ADMIN — Medication 1: at 11:54

## 2023-10-30 RX ADMIN — CLOPIDOGREL BISULFATE 75 MILLIGRAM(S): 75 TABLET, FILM COATED ORAL at 11:55

## 2023-10-30 RX ADMIN — Medication 25 MILLIGRAM(S): at 13:40

## 2023-10-30 RX ADMIN — Medication 25 MILLIGRAM(S): at 21:13

## 2023-10-30 RX ADMIN — PANTOPRAZOLE SODIUM 40 MILLIGRAM(S): 20 TABLET, DELAYED RELEASE ORAL at 05:26

## 2023-10-30 RX ADMIN — APIXABAN 5 MILLIGRAM(S): 2.5 TABLET, FILM COATED ORAL at 05:26

## 2023-10-30 RX ADMIN — Medication 200 MILLIGRAM(S): at 11:55

## 2023-10-30 RX ADMIN — ATORVASTATIN CALCIUM 80 MILLIGRAM(S): 80 TABLET, FILM COATED ORAL at 21:12

## 2023-10-30 RX ADMIN — SENNA PLUS 2 TABLET(S): 8.6 TABLET ORAL at 21:13

## 2023-10-30 RX ADMIN — APIXABAN 5 MILLIGRAM(S): 2.5 TABLET, FILM COATED ORAL at 17:28

## 2023-10-30 NOTE — PROGRESS NOTE ADULT - SUBJECTIVE AND OBJECTIVE BOX
Patient is a 78y old  Male who presents with a chief complaint of Chest pain (29 Oct 2023 15:05)    Pt seen and examined, no acute events.    MEDICATIONS  (STANDING):  allopurinol 200 milliGRAM(s) Oral daily  apixaban 5 milliGRAM(s) Oral two times a day  atorvastatin 80 milliGRAM(s) Oral at bedtime  chlorhexidine 2% Cloths 1 Application(s) Topical daily  clopidogrel Tablet 75 milliGRAM(s) Oral daily  dextrose 5%. 1000 milliLiter(s) (100 mL/Hr) IV Continuous <Continuous>  dextrose 5%. 1000 milliLiter(s) (50 mL/Hr) IV Continuous <Continuous>  dextrose 50% Injectable 25 Gram(s) IV Push once  dextrose 50% Injectable 12.5 Gram(s) IV Push once  dextrose 50% Injectable 25 Gram(s) IV Push once  glucagon  Injectable 1 milliGRAM(s) IntraMuscular once  insulin lispro (ADMELOG) corrective regimen sliding scale   SubCutaneous at bedtime  insulin lispro (ADMELOG) corrective regimen sliding scale   SubCutaneous three times a day before meals  lactated ringers. 1000 milliLiter(s) (500 mL/Hr) IV Continuous <Continuous>  lactated ringers. 1000 milliLiter(s) (75 mL/Hr) IV Continuous <Continuous>  metoprolol tartrate 25 milliGRAM(s) Oral three times a day  pantoprazole    Tablet 40 milliGRAM(s) Oral before breakfast  polyethylene glycol 3350 17 Gram(s) Oral daily  senna 2 Tablet(s) Oral at bedtime    MEDICATIONS  (PRN):  acetaminophen     Tablet .. 975 milliGRAM(s) Oral every 6 hours PRN Temp greater or equal to 38C (100.4F), Moderate Pain (4 - 6)  dextrose Oral Gel 15 Gram(s) Oral once PRN Blood Glucose LESS THAN 70 milliGRAM(s)/deciliter  oxyCODONE    IR 5 milliGRAM(s) Oral every 6 hours PRN Severe Pain (7 - 10)    Vital Signs Last 24 Hrs  T(C): 36.4 (30 Oct 2023 09:25), Max: 36.6 (29 Oct 2023 19:51)  T(F): 97.5 (30 Oct 2023 09:25), Max: 97.9 (29 Oct 2023 19:51)  HR: 103 (30 Oct 2023 09:25) (95 - 111)  BP: 110/64 (30 Oct 2023 09:25) (103/67 - 135/67)  BP(mean): --  RR: 18 (30 Oct 2023 09:25) (18 - 18)  SpO2: 98% (30 Oct 2023 09:25) (95% - 98%)    Parameters below as of 30 Oct 2023 09:25  Patient On (Oxygen Delivery Method): room air        PE  NAD  Awake, alert  Anicteric, MMM  No c/c/e  No rash grossly  FROM                          10.4   11.29 )-----------( 354      ( 30 Oct 2023 07:12 )             31.9       10-30    138  |  105  |  52<H>  ----------------------------<  121<H>  3.9   |  20<L>  |  1.40<H>    Ca    9.0      30 Oct 2023 07:11    TPro  6.1  /  Alb  2.4<L>  /  TBili  0.5  /  DBili  x   /  AST  109<H>  /  ALT  89<H>  /  AlkPhos  237<H>  10-30

## 2023-10-30 NOTE — PROGRESS NOTE ADULT - SUBJECTIVE AND OBJECTIVE BOX
DATE OF SERVICE: 10-30-23 @ 15:25    Patient is a 78y old  Male who presents with a chief complaint of Chest pain (30 Oct 2023 14:00)      INTERVAL HISTORY: Denies sob, chest pain and palpitations    REVIEW OF SYSTEMS:  CONSTITUTIONAL: No weakness  EYES/ENT: No visual changes;  No throat pain   NECK: No pain or stiffness  RESPIRATORY: No cough, wheezing; No shortness of breath  CARDIOVASCULAR: No chest pain or palpitations  GASTROINTESTINAL: No abdominal  pain. No nausea, vomiting, or hematemesis  GENITOURINARY: No dysuria, frequency or hematuria  NEUROLOGICAL: No stroke like symptoms  SKIN: No rashes    TELEMETRY Personally reviewed: AFIB   	  MEDICATIONS:  metoprolol tartrate 25 milliGRAM(s) Oral three times a day        PHYSICAL EXAM:  T(C): 36.4 (10-30-23 @ 09:25), Max: 36.6 (10-29-23 @ 19:51)  HR: 103 (10-30-23 @ 09:25) (95 - 104)  BP: 104/65 (10-30-23 @ 13:41) (103/67 - 135/67)  RR: 18 (10-30-23 @ 09:25) (18 - 18)  SpO2: 98% (10-30-23 @ 09:25) (95% - 98%)  Wt(kg): --  I&O's Summary    29 Oct 2023 07:01  -  30 Oct 2023 07:00  --------------------------------------------------------  IN: 500 mL / OUT: 900 mL / NET: -400 mL    30 Oct 2023 07:01  -  30 Oct 2023 15:25  --------------------------------------------------------  IN: 240 mL / OUT: 600 mL / NET: -360 mL          Appearance: In no distress	  HEENT:    PERRL, EOMI	  Cardiovascular:  S1 S2, No JVD  Respiratory: Lungs clear to auscultation	  Gastrointestinal:  Soft, Non-tender, + BS	  Vascularature:  No edema of LE  Psychiatric: Appropriate affect   Neuro: no acute focal deficits                               10.4   11.29 )-----------( 354      ( 30 Oct 2023 07:12 )             31.9     10-30    138  |  105  |  52<H>  ----------------------------<  121<H>  3.9   |  20<L>  |  1.40<H>    Ca    9.0      30 Oct 2023 07:11    TPro  6.1  /  Alb  2.4<L>  /  TBili  0.5  /  DBili  x   /  AST  109<H>  /  ALT  89<H>  /  AlkPhos  237<H>  10-30        Labs personally reviewed      ASSESSMENT/PLAN: 	  78 M with hx of CAD s/p NURIA to distal LAD in 6/2023, HTN, HLD, EtOH disorder, T2DM, spinal surgeries, neuroendocrine tumor on Lutathera (first treatment on Friday 10/20 at [facility] [place]), presenting with fatigue and chest pain since Friday. Patient reports severe fatigue starting Friday after his first treatment with Lutathera. Pt also reported mid-left lower chest pain described as pressure. No radiation, no aggravating or alleviating factors. Pt has never felt this type of chest pain before. Denies palpitations, diaphoresis, dyspnea, nausea, vomiting. Pt has diffuse abdominal pain. Also with constipation; last BM several days ago. No other acute complaints.      Problem/Plan - 1:  ·  Problem: Chest Pain.   ·  Plan: Location described as LUQ abdomen with pain elicited with palpation, appears noncardiac   - EKG SR with PACs, no ischemic characteristics  - Trop 72-->83--> 81/ CKMB 1.9  - 10/26 reports CP, trop increased to 110 --> 133 likely 2/2 demand i/s/o ST and fever. Trend to peak.   - Mildly elevated LFTS and Lipase noted  - Hx of recent PCI  - Repeat TTE unchanged   - CP possibly 2/2 lutathera with known S/E of abdominal discomfort  - 10/27 CP improved      Problem/Plan - 2:  ·  Problem: Shortness of Breath  ·  Plan: ECG non-ischemic  - TTE wnl.  - BNP 2572. Repeat 4171.  - CT neg for PE, no pulm edema noted  - s/p Lasix 20 IVP once. Will hold further diuretics given RAZIA.      Problem/Plan - 3:  ·  Problem: CAD.   ·  Plan: Recent PCI to pLAD in June 2023  - ECG non-ischemic  - Trop/CKMB as noted above  - c/w Plavix and statin, d/c ASA given starting Eliquis  - TTE unchanged     Problem/Plan - 4:  ·  Problem: RAZIA  ·  Plan: Likely dry, will gentle hydrate      Problem/Plan - 5:  ·  Problem: New Onset Atrial Fibrillation  - As per tele, converted to rapid AF rates 120-140bpm  - Check TSH  - Will increase Toprol to 25mg PO TID, uptitrate to 50mg BID if BP tolerates  - Will consider DCCV if remains in AF RVR  - [id number] of 2. Recommend AC.  - Eliquis 5mg BID             ISAMAR Foster DO [facility]  Cardiovascular Medicine  [address] [place] [address], [address]  Available via call or text on [employer]  Office: [phone]   DATE OF SERVICE: 10-30-23 @ 15:25    Patient is a 78y old  Male who presents with a chief complaint of Chest pain (30 Oct 2023 14:00)      INTERVAL HISTORY: Denies sob, chest pain and palpitations    REVIEW OF SYSTEMS:  CONSTITUTIONAL: No weakness  EYES/ENT: No visual changes;  No throat pain   NECK: No pain or stiffness  RESPIRATORY: No cough, wheezing; No shortness of breath  CARDIOVASCULAR: No chest pain or palpitations  GASTROINTESTINAL: No abdominal  pain. No nausea, vomiting, or hematemesis  GENITOURINARY: No dysuria, frequency or hematuria  NEUROLOGICAL: No stroke like symptoms  SKIN: No rashes    TELEMETRY Personally reviewed: AFIB   	  MEDICATIONS:  metoprolol tartrate 25 milliGRAM(s) Oral three times a day        PHYSICAL EXAM:  T(C): 36.4 (10-30-23 @ 09:25), Max: 36.6 (10-29-23 @ 19:51)  HR: 103 (10-30-23 @ 09:25) (95 - 104)  BP: 104/65 (10-30-23 @ 13:41) (103/67 - 135/67)  RR: 18 (10-30-23 @ 09:25) (18 - 18)  SpO2: 98% (10-30-23 @ 09:25) (95% - 98%)  Wt(kg): --  I&O's Summary    29 Oct 2023 07:01  -  30 Oct 2023 07:00  --------------------------------------------------------  IN: 500 mL / OUT: 900 mL / NET: -400 mL    30 Oct 2023 07:01  -  30 Oct 2023 15:25  --------------------------------------------------------  IN: 240 mL / OUT: 600 mL / NET: -360 mL          Appearance: In no distress	  HEENT:    PERRL, EOMI	  Cardiovascular:  S1 S2, No JVD  Respiratory: Lungs clear to auscultation	  Gastrointestinal:  Soft, Non-tender, + BS	  Vascularature:  No edema of LE  Psychiatric: Appropriate affect   Neuro: no acute focal deficits                               10.4   11.29 )-----------( 354      ( 30 Oct 2023 07:12 )             31.9     10-30    138  |  105  |  52<H>  ----------------------------<  121<H>  3.9   |  20<L>  |  1.40<H>    Ca    9.0      30 Oct 2023 07:11    TPro  6.1  /  Alb  2.4<L>  /  TBili  0.5  /  DBili  x   /  AST  109<H>  /  ALT  89<H>  /  AlkPhos  237<H>  10-30        Labs personally reviewed      ASSESSMENT/PLAN: 	  78 M with hx of CAD s/p NURIA to distal LAD in 6/2023, HTN, HLD, EtOH disorder, T2DM, spinal surgeries, neuroendocrine tumor on Lutathera (first treatment on Friday 10/20 at [facility] [place]), presenting with fatigue and chest pain since Friday. Patient reports severe fatigue starting Friday after his first treatment with Lutathera. Pt also reported mid-left lower chest pain described as pressure. No radiation, no aggravating or alleviating factors. Pt has never felt this type of chest pain before. Denies palpitations, diaphoresis, dyspnea, nausea, vomiting. Pt has diffuse abdominal pain. Also with constipation; last BM several days ago. No other acute complaints.      Problem/Plan - 1:  ·  Problem: Chest Pain.   ·  Plan: Location described as LUQ abdomen with pain elicited with palpation, appears noncardiac   - EKG SR with PACs, no ischemic characteristics  - Trop 72-->83--> 81/ CKMB 1.9  - 10/26 reports CP, trop increased to 110 --> 133 likely 2/2 demand i/s/o ST and fever. Trend to peak.   - Mildly elevated LFTS and Lipase noted  - Hx of recent PCI  - Repeat TTE unchanged   - CP possibly 2/2 lutathera with known S/E of abdominal discomfort  - 10/27 CP improved      Problem/Plan - 2:  ·  Problem: Shortness of Breath  ·  Plan: ECG non-ischemic  - TTE wnl.  - BNP 2572. Repeat 4171.  - CT neg for PE, no pulm edema noted  - s/p Lasix 20 IVP once. Will hold further diuretics given RAZIA.      Problem/Plan - 3:  ·  Problem: CAD.   ·  Plan: Recent PCI to pLAD in June 2023  - ECG non-ischemic  - Trop/CKMB as noted above  - c/w Plavix and statin, d/c ASA given starting Eliquis  - TTE unchanged     Problem/Plan - 4:  ·  Problem: RAZIA  ·  Plan: Likely dry, will gentle hydrate      Problem/Plan - 5:  ·  Problem: New Onset Atrial Fibrillation  - As per tele, converted to rapid AF rates 120-140bpm  - Check TSH  - Will increase Toprol to 25mg PO TID, uptitrate to 50mg BID if BP tolerates  - Plan for DCCV in AM  - [id number] of 2. Recommend AC.  - Eliquis 5mg BID             ISAMAR Foster DO [facility]  Cardiovascular Medicine  [address] [place] [address], [address]  Available via call or text on [employer] TE[employer]  Office: [phone]

## 2023-10-30 NOTE — CONSULT NOTE ADULT - SUBJECTIVE AND OBJECTIVE BOX
CHIEF COMPLAINT: "I get SOB some times with exertion"     HISTORY OF PRESENT ILLNESS:   78 year old Male with hx of CAD s/p NURIA to distal LAD in 6/2023, HTN, HLD, prior EtOH disorder (denies current use), T2DM, Scoliosis, spinal surgeries, Metastatic Pancreatic neuroendocrine tumor on Lutathera (first treatment on Friday 10/20 at [facility] [place]), presenting with fatigue and chest pain since Friday. Patient reports severe fatigue starting Friday after his first treatment with Lutathera. Pt also reported mid-left lower chest pain described as pressure. No radiation, no aggravating or alleviating factors. Pt has never felt this type of chest pain before. Denies palpitations, diaphoresis, dyspnea, nausea, vomiting. Pt has diffuse abdominal pain. Also with constipation; last BM several days ago. No other acute complaints.  EP consulted for  new onset AFib started on 10/26/23 in-patient with RVR.        Allergies    No Known Allergies    Intolerances    	    MEDICATIONS:  apixaban 5 milliGRAM(s) Oral two times a day  clopidogrel Tablet 75 milliGRAM(s) Oral daily  metoprolol tartrate 25 milliGRAM(s) Oral three times a day        acetaminophen     Tablet .. 975 milliGRAM(s) Oral every 6 hours PRN  oxyCODONE    IR 5 milliGRAM(s) Oral every 6 hours PRN    pantoprazole    Tablet 40 milliGRAM(s) Oral before breakfast  polyethylene glycol 3350 17 Gram(s) Oral daily  senna 2 Tablet(s) Oral at bedtime    allopurinol 200 milliGRAM(s) Oral daily  atorvastatin 80 milliGRAM(s) Oral at bedtime  dextrose 50% Injectable 25 Gram(s) IV Push once  dextrose 50% Injectable 12.5 Gram(s) IV Push once  dextrose 50% Injectable 25 Gram(s) IV Push once  dextrose Oral Gel 15 Gram(s) Oral once PRN  glucagon  Injectable 1 milliGRAM(s) IntraMuscular once  insulin lispro (ADMELOG) corrective regimen sliding scale   SubCutaneous at bedtime  insulin lispro (ADMELOG) corrective regimen sliding scale   SubCutaneous three times a day before meals    chlorhexidine 2% Cloths 1 Application(s) Topical daily  dextrose 5%. 1000 milliLiter(s) IV Continuous <Continuous>  dextrose 5%. 1000 milliLiter(s) IV Continuous <Continuous>  lactated ringers. 1000 milliLiter(s) IV Continuous <Continuous>  lactated ringers. 1000 milliLiter(s) IV Continuous <Continuous>      PAST MEDICAL & SURGICAL HISTORY:  Hypertension      Hypercholesterolemia      PAD (peripheral artery disease)      Neuroendocrine carcinoma      S/P knee replacement, bilateral      S/P hip replacement  right hip      History of hernia repair      H/O laminectomy      History of lumbosacral spine surgery          FAMILY HISTORY:      SOCIAL HISTORY:    [ ] Non-smoker  [ ] Smoker  [ ] Alcohol      REVIEW OF SYSTEMS:  See HPI. Otherwise, 10 point ROS done and otherwise negative.    PHYSICAL EXAM:  T(C): 36.4 (10-30-23 @ 09:25), Max: 36.6 (10-29-23 @ 19:51)  HR: 103 (10-30-23 @ 09:25) (95 - 111)  BP: 104/65 (10-30-23 @ 13:41) (103/67 - 135/67)  RR: 18 (10-30-23 @ 09:25) (18 - 18)  SpO2: 98% (10-30-23 @ 09:25) (95% - 98%)  Wt(kg): --  I&O's Summary    29 Oct 2023 07:01  -  30 Oct 2023 07:00  --------------------------------------------------------  IN: 500 mL / OUT: 900 mL / NET: -400 mL    30 Oct 2023 07:01  -  30 Oct 2023 13:57  --------------------------------------------------------  IN: 240 mL / OUT: 600 mL / NET: -360 mL        Appearance: Normal	  HEENT:   Normal oral mucosa, PERRL, EOMI	  Lymphatic: No lymphadenopathy  Cardiovascular: Normal S1 S2, No JVD, No murmurs, No edema  Respiratory: Lungs clear to auscultation	  Psychiatry: A & O x 3, Mood & affect appropriate  Gastrointestinal:  Soft, Non-tender, + BS	  Skin: No rashes, No ecchymoses, No cyanosis	  Neurologic: Non-focal  Extremities: Normal range of motion, No clubbing, cyanosis or edema  Vascular: Peripheral pulses palpable 2+ bilaterally        LABS:	 	    CBC Full  -  ( 30 Oct 2023 07:12 )  WBC Count : 11.29 K/uL  Hemoglobin : 10.4 g/dL  Hematocrit : 31.9 %  Platelet Count - Automated : 354 K/uL  Mean Cell Volume : 91.1 fl  Mean Cell Hemoglobin : 29.7 pg  Mean Cell Hemoglobin Concentration : 32.6 gm/dL  Auto Neutrophil # : 9.15 K/uL  Auto Lymphocyte # : 0.73 K/uL  Auto Monocyte # : 1.02 K/uL  Auto Eosinophil # : 0.19 K/uL  Auto Basophil # : 0.03 K/uL  Auto Neutrophil % : 81.0 %  Auto Lymphocyte % : 6.5 %  Auto Monocyte % : 9.0 %  Auto Eosinophil % : 1.7 %  Auto Basophil % : 0.3 %    10-30    138  |  105  |  52<H>  ----------------------------<  121<H>  3.9   |  20<L>  |  1.40<H>  10-29    138  |  104  |  60<H>  ----------------------------<  129<H>  4.0   |  21<L>  |  1.61<H>    Ca    9.0      30 Oct 2023 07:11  Ca    9.0      29 Oct 2023 07:05    TPro  6.1  /  Alb  2.4<L>  /  TBili  0.5  /  DBili  x   /  AST  109<H>  /  ALT  89<H>  /  AlkPhos  237<H>  10-30  TPro  6.5  /  Alb  2.6<L>  /  TBili  0.5  /  DBili  x   /  AST  107<H>  /  ALT  83<H>  /  AlkPhos  241<H>  10-29       TELEMETRY: 	  AFib 100-120bpm     TTE:      TRANSTHORACIC ECHOCARDIOGRAM REPORT     Pt. Name:       NANDO HERNANDEZ Study Date:    10/25/2023  MRN:            [id number]        YOB: 1945  Accession #:    [id number]         Age:           78 years  Account#:       [id number]      Gender:        M  Heart Rate:                       Height:        72.00 in (182.88 cm)  Rhythm:      Weight:        165.00 lb (74.84 kg)  Blood Pressure: 123/82 mmHg       BSA/BMI:       1.96 m² / 22.38 kg/m²  ________________________________________________________________________________________  Referring Physician:    [id number] Stevenson Wilson  Interpreting Physician: Jason Camarena M.D.  Primary Sonographer:    Gerard Lazo RDCS    CPT:                ECHO TTE WITH CON COMP W DOPP - .m;DEFINITY ECHO                      CONTRAST PER ML - .m;DEFINITY ECHO CONTRAST PER ML                WASTED - .m  Indication(s):      Abnormal electrocardiogram ECG/EKG - R94.31  Procedure:          Transthoracic echocardiogram with 2-D, M-mode and complete                      spectral and color flow Doppler.  Ordering Location:[facility]  Admission Status:   Inpatient  Contrast Injection: Verbal consent was obtained for injection of Ultrasonic                      Enhancing Agent following a discussion of risks and                      benefits.                      Endocardial visualization enhanced with 2 ml of Definity                      Ultrasound enhancing agent (Lot#:[id number] Exp.Date:Sep24                      Discarded Dose:8ml).  UEA Reaction:       Patient had no adverse reaction after injection of    Ultrasound Enhancing Agent.  Study Information:  Image quality for this study is less than ideal.    _______________________________________________________________________________________     CONCLUSIONS:      1. Left ventricular cavity is normal.Left ventricular wall thickness is mildly increased. Left ventricular systolic function is normal with an ejection fraction of 64 % by Avila's method of disks. There are no regional wall motion abnormalities seen.   2. Normal left ventricular diastolic function, with normal filling pressure.   3. Normal right ventricular cavity size, wall thickness, and systolic function.   4. The left atrium is moderately dilated in size.   5. No significant valvular disease.   6. No pericardial effusion seen.   7. No prior echocardiogram is available for comparison.    ________________________________________________________________________________________  FINDINGS:     Left Ventricle:  The left ventricular cavity is normal. Left ventricular wall thickness is mildly increased. Left ventricular systolic function is normal with a calculated ejection fraction of 64 % by the Avila's biplane method of disks. There are no regional wall motion abnormalities seen. There is normal left ventricular diastolicfunction, with normal filling pressure. There is no evidence of a left ventricular thrombus.     Right Ventricle:  The right ventricular cavity is normal in size, normal wall thickness and normal systolic function.     Left Atrium:  The left atrium is moderately dilated in size with an indexed volume of 51.00 ml/m².     Right Atrium:  The right atrium is normal in size.     Interatrial Septum:  The interatrial septum appears intact.     Aortic Valve:  The aortic valve appears trileaflet. There isfibrocalcific aortic valve sclerosis without stenosis. There is no evidence of aortic regurgitation.     Mitral Valve:  Structurally normal mitral valve with normal leaflet excursion. There is no mitral valve stenosis. There is trace mitral regurgitation.     Tricuspid Valve:  Structurally normal tricuspid valve with normal leaflet excursion. There is trace tricuspid regurgitation.     Pulmonic Valve:  Structurally normal pulmonic valve with normal leaflet excursion. There is trace pulmonic regurgitation.     Aorta:  The aortic annulus and aortic root appear normal in size.     Pericardium:  No pericardial effusion seen.     Systemic Veins:  The inferior vena cava is normal in size (normal <2.1cm) with normal inspiratory collapse (normal >50%) consistent with normal right atrial pressure (~3, range 0-5mmHg).  ____________________________________________________________________  Quantitative Data:  Left Ventricle Measurements: (Indexed to BSA)     IVSd (2D):   1.3 cm  LVPWd (2D):  1.5 cm  LVIDd (2D):  4.9 cm  LVIDs (2D):  3.5 cm  LV Mass:     278 g  141.7 g/m²  BiPlane LV EF%: 64 %     MV E Vmax:    0.70 m/s  MV A Vmax:    0.53 m/s  MV E/A:       1.31  e' lateral:   10.10 cm/s  e' medial:    4.87 cm/s  E/e' lateral: 6.90  E/e' medial:14.30  E/e' Average: 9.30  MV DT:        169 msec    Aorta Measurements: (normal range) (Indexed to BSA)     Sinuses of Valsalva: 3.60 cm (3.1 - 3.7 cm)       Left Atrium Measurements: (Indexed to BSA)  LA Diam 2D: 3.40 cm       LVOT / RVOT/ Qp/Qs Data: (Indexed to BSA)  LVOT Diameter: 2.10 cm  LVOT Vmax:     1.08 m/s    Aortic Valve Measurements:  AV Vmax:          2.1 m/s  AV Peak Gradient: 17.0 mmHg    Mitral Valve Measurements:     MV E Vmax: 0.7 m/s  MV A Vmax: 0.5 m/s  MV E/A:    1.3       Tricuspid Valve Measurements:     RA Pressure: 3 mmHg    ________________________________________________________________________________________  Electronically signed on 10/25/2023 at 1:44:39 PM by Jason Camarena M.D.         *** Final ***

## 2023-10-30 NOTE — CONSULT NOTE ADULT - NS ATTEND AMEND GEN_ALL_CORE FT
Patient care and plan discussed and reviewed with Advanced Care Provider. Plan as outlined above edited by me to reflect our discussion.
Patient seen at bedside. Metastatic cancer with liver mets. Severe biatrial enlargement. Plan for TRACY/DCCV (as relationship between AF onset and initiation of a/c is unclear). Discussed black box warnings regarding Amiodarone use with the patient. Would like to first confirm with Oncology that his treatment regiment for his malignancy will not likely result in thrombocytopenia to the extent that his oral anticoagulation would need to be interrupted in the first 30 days following his cardioversion. Keep NPO after midnight.     JESSI Albrecht

## 2023-10-30 NOTE — PROGRESS NOTE ADULT - ASSESSMENT
78 M with hx of CAD s/p NURIA to distal LAD in 6/2023, HTN, HLD, EtOH disorder, T2DM, spinal surgeries, neuroendocrine tumor on Lutathera (first treatment on Friday 10/20 at [facility] [place]), presenting with fatigue and chest pain, fever + SIRS criteria. Concern for reaction to Lutathera vs progression of disease given start of symptoms

## 2023-10-30 NOTE — PROGRESS NOTE ADULT - PROBLEM SELECTOR PLAN 1
w/ RVR  d/w cardiology  c/w  metoprolol to 25mg TID, required dig x 1 10/27/23  eliquis 5 BID for a/c - monitor cr  stopped ASA and continued plavix to avoid triple therapy  EP evaluating for DCCV

## 2023-10-30 NOTE — PROGRESS NOTE ADULT - PROBLEM SELECTOR PLAN 4
was already increasing but worsened after torsemide started  now d/c'ed by cards  Labs evaluated with worsening Cr and transaminases concerning for low volume state, discussed with Dr. Hayes and s/p gentle hydration with LR @75 for 10 hours on 10/28/23 with improvement in creatinine; appears relatively euvoloemic today. Assess fluid status, creatinine and need for further fluids daily  monitor BMP

## 2023-10-30 NOTE — CONSULT NOTE ADULT - ASSESSMENT
78 year old Male with hx of CAD s/p NURIA to distal LAD in 6/2023, HTN, HLD, prior EtOH disorder (denies current use), T2DM, Scoliosis, spinal surgeries, Metastatic Pancreatic neuroendocrine tumor on Lutathera ([place] [facility] [place] with fatigue and chest pain since Friday. Patient reports severe fatigue starting Friday after his first treatment with Lutathera. Pt also reported mid-left lower chest pain described as pressure. No radiation, no aggravating or alleviating factors. Pt has never felt this type of chest pain before. Denies palpitations, diaphoresis, dyspnea, nausea, vomiting. Pt has diffuse abdominal pain. Also with constipation; last BM several days ago. No other acute complaints.  EP consulted for  new onset AFib started on 10/26/23 in-patient with RVR.      1. New Onset AFib with RVR  2. Metastatic Pancreatic neuroendocrine tumor  3. Elevated LFT's       - Per tele review, patient has been in AFib since 10/26.  On 10/24 patient had PAF up to 7.5 seconds. Eliquis started on 10/27 at 1713 first dose.        HASEEB Jha [facility]  [phone]    78 year old Male with hx of CAD s/p NURIA to distal LAD in 6/2023, HTN, HLD, prior EtOH disorder (denies current use), T2DM, Scoliosis, spinal surgeries, Metastatic Pancreatic neuroendocrine tumor on Lutathera (first treatment on Friday 10/20 at [facility] [place] presenting with fatigue and chest pain since Friday. Patient reports severe fatigue starting Friday after his first treatment with Lutathera. Pt also reported mid-left lower chest pain described as pressure. No radiation, no aggravating or alleviating factors. Pt has never felt this type of chest pain before. Denies palpitations, diaphoresis, dyspnea, nausea, vomiting. Pt has diffuse abdominal pain. Also with constipation; last BM several days ago. No other acute complaints.  EP consulted for  new onset AFib started on 10/26/23 in-patient with RVR.      1. New Onset AFib with RVR  2. Metastatic Pancreatic neuroendocrine tumor  3. Elevated LFT's       - Per tele review, patient has been in AFib since 10/26.  Since admission patient has been having PAF.   - Eliquis started on 10/27 at 1713 first dose.  Continue Eliquis 5mg  PO BID without interruption  - Plan for TRACY/DCCV on 10/31 likely start Amiodarone post   - Repeat LFT's in am, today , ALT 89  - NPO after midnight jorge Jha [facility]  [phone]    78 year old Male with hx of CAD s/p NURIA to distal LAD in 6/2023, HTN, HLD, prior EtOH disorder (denies current use), T2DM, Scoliosis, spinal surgeries, Metastatic Pancreatic neuroendocrine tumor on [place]utathera [place] [facility] [place] with fatigue and chest pain since Friday. Patient reports severe fatigue starting Friday after his first treatment with Lutathera. Pt also reported mid-left lower chest pain described as pressure. No radiation, no aggravating or alleviating factors. Pt has never felt this type of chest pain before. Denies palpitations, diaphoresis, dyspnea, nausea, vomiting. Pt has diffuse abdominal pain. Also with constipation; last BM several days ago. No other acute complaints.  EP consulted for  new onset AFib started on 10/26/23 in-patient with RVR.      1. New Onset AFib with RVR  2. Metastatic Pancreatic neuroendocrine tumor  3. Elevated LFT's       - Per tele review, patient has been in AFib since 10/26.  Since admission patient has been having PAF.   - Eliquis started on 10/27 at 1713 first dose.  Continue Eliquis 5mg  PO BID without interruption  Please clarify if patient has had prior thrombocytopenia on Lutathera before we proceed with Cardioversion and commit patient to anticoagulation.  Message left with Heme Onc office.   - Plan for TRACY/DCCV on 10/31 likely start Amiodarone post   - Repeat LFT's in am, today , ALT 89  - NPO after midnight tonight   - Check TSH in AM- ordered       HASEEB Jha [facility]  [phone]

## 2023-10-30 NOTE — PROGRESS NOTE ADULT - SUBJECTIVE AND OBJECTIVE BOX
[facility] Division of Hospital Medicine  Armando Ann  MS Teams      SUBJECTIVE / OVERNIGHT EVENTS:  No events overnight  Denies cp/sob/palpitations  Remains Afib 110s-120s on tele    ADDITIONAL REVIEW OF SYSTEMS:    MEDICATIONS  (STANDING):  allopurinol 200 milliGRAM(s) Oral daily  apixaban 5 milliGRAM(s) Oral two times a day  atorvastatin 80 milliGRAM(s) Oral at bedtime  chlorhexidine 2% Cloths 1 Application(s) Topical daily  clopidogrel Tablet 75 milliGRAM(s) Oral daily  dextrose 5%. 1000 milliLiter(s) (100 mL/Hr) IV Continuous <Continuous>  dextrose 5%. 1000 milliLiter(s) (50 mL/Hr) IV Continuous <Continuous>  dextrose 50% Injectable 25 Gram(s) IV Push once  dextrose 50% Injectable 12.5 Gram(s) IV Push once  dextrose 50% Injectable 25 Gram(s) IV Push once  glucagon  Injectable 1 milliGRAM(s) IntraMuscular once  insulin lispro (ADMELOG) corrective regimen sliding scale   SubCutaneous at bedtime  insulin lispro (ADMELOG) corrective regimen sliding scale   SubCutaneous three times a day before meals  lactated ringers. 1000 milliLiter(s) (500 mL/Hr) IV Continuous <Continuous>  lactated ringers. 1000 milliLiter(s) (75 mL/Hr) IV Continuous <Continuous>  metoprolol tartrate 25 milliGRAM(s) Oral three times a day  pantoprazole    Tablet 40 milliGRAM(s) Oral before breakfast  polyethylene glycol 3350 17 Gram(s) Oral daily  senna 2 Tablet(s) Oral at bedtime    MEDICATIONS  (PRN):  acetaminophen     Tablet .. 975 milliGRAM(s) Oral every 6 hours PRN Temp greater or equal to 38C (100.4F), Moderate Pain (4 - 6)  dextrose Oral Gel 15 Gram(s) Oral once PRN Blood Glucose LESS THAN 70 milliGRAM(s)/deciliter  oxyCODONE    IR 5 milliGRAM(s) Oral every 6 hours PRN Severe Pain (7 - 10)      I&O's Summary    29 Oct 2023 07:01  -  30 Oct 2023 07:00  --------------------------------------------------------  IN: 500 mL / OUT: 900 mL / NET: -400 mL    30 Oct 2023 07:01  -  30 Oct 2023 14:01  --------------------------------------------------------  IN: 240 mL / OUT: 600 mL / NET: -360 mL        PHYSICAL EXAM:  Vital Signs Last 24 Hrs  T(C): 36.4 (30 Oct 2023 09:25), Max: 36.6 (29 Oct 2023 19:51)  T(F): 97.5 (30 Oct 2023 09:25), Max: 97.9 (29 Oct 2023 19:51)  HR: 103 (30 Oct 2023 09:25) (95 - 111)  BP: 104/65 (30 Oct 2023 13:41) (103/67 - 135/67)  BP(mean): --  RR: 18 (30 Oct 2023 09:25) (18 - 18)  SpO2: 98% (30 Oct 2023 09:25) (95% - 98%)    Parameters below as of 30 Oct 2023 09:25  Patient On (Oxygen Delivery Method): room air      CONSTITUTIONAL: NAD, well-developed, well-groomed  EYES: PERRLA; conjunctiva and sclera clear  NECK: Supple, no palpable masses  RESPIRATORY: Normal respiratory effort; lungs are clear to auscultation bilaterally  CARDIOVASCULAR: irreg rate and rhythm, normal S1 and S2, no murmur: 1+ LE edema; Peripheral pulses are 2+ bilaterally  ABDOMEN: Nontender to palpation, normoactive bowel sounds, no rebound/guarding  MUSCULOSKELETAL:  no clubbing or cyanosis of digits; no joint swelling or tenderness to palpation  PSYCH: A+O to person, place, and time; affect appropriate  NEUROLOGY: CN 2-12 are intact and symmetric; no gross sensory deficits   SKIN: No rashes; no palpable lesions    LABS:                        10.4   11.29 )-----------( 354      ( 30 Oct 2023 07:12 )             31.9     10-30    138  |  105  |  52<H>  ----------------------------<  121<H>  3.9   |  20<L>  |  1.40<H>    Ca    9.0      30 Oct 2023 07:11    TPro  6.1  /  Alb  2.4<L>  /  TBili  0.5  /  DBili  x   /  AST  109<H>  /  ALT  89<H>  /  AlkPhos  237<H>  10-30          Urinalysis Basic - ( 30 Oct 2023 07:11 )    Color: x / Appearance: x / SG: x / pH: x  Gluc: 121 mg/dL / Ketone: x  / Bili: x / Urobili: x   Blood: x / Protein: x / Nitrite: x   Leuk Esterase: x / RBC: x / WBC x   Sq Epi: x / Non Sq Epi: x / Bacteria: x

## 2023-10-30 NOTE — PROGRESS NOTE ADULT - ASSESSMENT
NANDO HERNANDEZ is a 78y Male who presents with a chief complaint of chest pain    Metastatic Pancreatic Neuroendocrine Tumor  ·	Patient follows with Dr. Sophia Prasad, [facility].  ·	Recent progression of disease; last received Lutetium Edith-177 LUTATHERA on October 20th.  ·	No systemic therapy while inpatient or during rehabilitation    Anemia  ·	In the setting of inflammation, active malignancy  ·	Hemoglobin has been stable.  ·	Monitor and maintain HGB > 7    Chest Pain  Dyspnea  ·	Possibly due to abdominal pain which can occur with LUTATHERA  ·	Echocardiogram with no obvious abnormalities. EKG non-ischemic  ·	Diuretics on hold due to kidney injury.  ·	Follow-up cardiology recommendations    Acute Kidney Injury  ·	In the setting of recent diuresis. Continues IV fluids.  ·	Creatinine 1.4, lower today    Atrial Fibrillation  ·	Patient is now on apixaban and clopidogrel.  ·	Also on rate control with metoprolol  ·	Follow-up cardiology recommendations; Plan for EP evaluation for DCCV.    Will continue to follow.    Hugo Topete PA-C  Hematology/Oncology  [facility]  [phone] (office)

## 2023-10-31 LAB
ALBUMIN SERPL ELPH-MCNC: 2.6 G/DL — LOW (ref 3.3–5)
ALBUMIN SERPL ELPH-MCNC: 2.6 G/DL — LOW (ref 3.3–5)
ALP SERPL-CCNC: 263 U/L — HIGH (ref 40–120)
ALP SERPL-CCNC: 263 U/L — HIGH (ref 40–120)
ALT FLD-CCNC: 110 U/L — HIGH (ref 10–45)
ALT FLD-CCNC: 110 U/L — HIGH (ref 10–45)
ANION GAP SERPL CALC-SCNC: 14 MMOL/L — SIGNIFICANT CHANGE UP (ref 5–17)
ANION GAP SERPL CALC-SCNC: 14 MMOL/L — SIGNIFICANT CHANGE UP (ref 5–17)
AST SERPL-CCNC: 127 U/L — HIGH (ref 10–40)
AST SERPL-CCNC: 127 U/L — HIGH (ref 10–40)
BASOPHILS # BLD AUTO: 0.03 K/UL — SIGNIFICANT CHANGE UP (ref 0–0.2)
BASOPHILS # BLD AUTO: 0.03 K/UL — SIGNIFICANT CHANGE UP (ref 0–0.2)
BASOPHILS NFR BLD AUTO: 0.3 % — SIGNIFICANT CHANGE UP (ref 0–2)
BASOPHILS NFR BLD AUTO: 0.3 % — SIGNIFICANT CHANGE UP (ref 0–2)
BILIRUB SERPL-MCNC: 0.6 MG/DL — SIGNIFICANT CHANGE UP (ref 0.2–1.2)
BILIRUB SERPL-MCNC: 0.6 MG/DL — SIGNIFICANT CHANGE UP (ref 0.2–1.2)
BUN SERPL-MCNC: 47 MG/DL — HIGH (ref 7–23)
BUN SERPL-MCNC: 47 MG/DL — HIGH (ref 7–23)
CALCIUM SERPL-MCNC: 8.2 MG/DL — LOW (ref 8.4–10.5)
CALCIUM SERPL-MCNC: 8.2 MG/DL — LOW (ref 8.4–10.5)
CHLORIDE SERPL-SCNC: 107 MMOL/L — SIGNIFICANT CHANGE UP (ref 96–108)
CHLORIDE SERPL-SCNC: 107 MMOL/L — SIGNIFICANT CHANGE UP (ref 96–108)
CO2 SERPL-SCNC: 18 MMOL/L — LOW (ref 22–31)
CO2 SERPL-SCNC: 18 MMOL/L — LOW (ref 22–31)
CREAT SERPL-MCNC: 1.16 MG/DL — SIGNIFICANT CHANGE UP (ref 0.5–1.3)
CREAT SERPL-MCNC: 1.16 MG/DL — SIGNIFICANT CHANGE UP (ref 0.5–1.3)
CULTURE RESULTS: SIGNIFICANT CHANGE UP
EGFR: 64 ML/MIN/1.73M2 — SIGNIFICANT CHANGE UP
EGFR: 64 ML/MIN/1.73M2 — SIGNIFICANT CHANGE UP
EOSINOPHIL # BLD AUTO: 0.13 K/UL — SIGNIFICANT CHANGE UP (ref 0–0.5)
EOSINOPHIL # BLD AUTO: 0.13 K/UL — SIGNIFICANT CHANGE UP (ref 0–0.5)
EOSINOPHIL NFR BLD AUTO: 1.2 % — SIGNIFICANT CHANGE UP (ref 0–6)
EOSINOPHIL NFR BLD AUTO: 1.2 % — SIGNIFICANT CHANGE UP (ref 0–6)
GLUCOSE BLDC GLUCOMTR-MCNC: 109 MG/DL — HIGH (ref 70–99)
GLUCOSE BLDC GLUCOMTR-MCNC: 109 MG/DL — HIGH (ref 70–99)
GLUCOSE BLDC GLUCOMTR-MCNC: 116 MG/DL — HIGH (ref 70–99)
GLUCOSE BLDC GLUCOMTR-MCNC: 116 MG/DL — HIGH (ref 70–99)
GLUCOSE BLDC GLUCOMTR-MCNC: 119 MG/DL — HIGH (ref 70–99)
GLUCOSE BLDC GLUCOMTR-MCNC: 119 MG/DL — HIGH (ref 70–99)
GLUCOSE BLDC GLUCOMTR-MCNC: 184 MG/DL — HIGH (ref 70–99)
GLUCOSE BLDC GLUCOMTR-MCNC: 184 MG/DL — HIGH (ref 70–99)
GLUCOSE SERPL-MCNC: 122 MG/DL — HIGH (ref 70–99)
GLUCOSE SERPL-MCNC: 122 MG/DL — HIGH (ref 70–99)
HCT VFR BLD CALC: 34.7 % — LOW (ref 39–50)
HCT VFR BLD CALC: 34.7 % — LOW (ref 39–50)
HGB BLD-MCNC: 11.1 G/DL — LOW (ref 13–17)
HGB BLD-MCNC: 11.1 G/DL — LOW (ref 13–17)
IMM GRANULOCYTES NFR BLD AUTO: 1.3 % — HIGH (ref 0–0.9)
IMM GRANULOCYTES NFR BLD AUTO: 1.3 % — HIGH (ref 0–0.9)
LYMPHOCYTES # BLD AUTO: 0.71 K/UL — LOW (ref 1–3.3)
LYMPHOCYTES # BLD AUTO: 0.71 K/UL — LOW (ref 1–3.3)
LYMPHOCYTES # BLD AUTO: 6.7 % — LOW (ref 13–44)
LYMPHOCYTES # BLD AUTO: 6.7 % — LOW (ref 13–44)
MCHC RBC-ENTMCNC: 29.8 PG — SIGNIFICANT CHANGE UP (ref 27–34)
MCHC RBC-ENTMCNC: 29.8 PG — SIGNIFICANT CHANGE UP (ref 27–34)
MCHC RBC-ENTMCNC: 32 GM/DL — SIGNIFICANT CHANGE UP (ref 32–36)
MCHC RBC-ENTMCNC: 32 GM/DL — SIGNIFICANT CHANGE UP (ref 32–36)
MCV RBC AUTO: 93 FL — SIGNIFICANT CHANGE UP (ref 80–100)
MCV RBC AUTO: 93 FL — SIGNIFICANT CHANGE UP (ref 80–100)
MONOCYTES # BLD AUTO: 0.95 K/UL — HIGH (ref 0–0.9)
MONOCYTES # BLD AUTO: 0.95 K/UL — HIGH (ref 0–0.9)
MONOCYTES NFR BLD AUTO: 9 % — SIGNIFICANT CHANGE UP (ref 2–14)
MONOCYTES NFR BLD AUTO: 9 % — SIGNIFICANT CHANGE UP (ref 2–14)
NEUTROPHILS # BLD AUTO: 8.64 K/UL — HIGH (ref 1.8–7.4)
NEUTROPHILS # BLD AUTO: 8.64 K/UL — HIGH (ref 1.8–7.4)
NEUTROPHILS NFR BLD AUTO: 81.5 % — HIGH (ref 43–77)
NEUTROPHILS NFR BLD AUTO: 81.5 % — HIGH (ref 43–77)
NRBC # BLD: 0 /100 WBCS — SIGNIFICANT CHANGE UP (ref 0–0)
NRBC # BLD: 0 /100 WBCS — SIGNIFICANT CHANGE UP (ref 0–0)
PLATELET # BLD AUTO: 417 K/UL — HIGH (ref 150–400)
PLATELET # BLD AUTO: 417 K/UL — HIGH (ref 150–400)
POTASSIUM SERPL-MCNC: 4.5 MMOL/L — SIGNIFICANT CHANGE UP (ref 3.5–5.3)
POTASSIUM SERPL-MCNC: 4.5 MMOL/L — SIGNIFICANT CHANGE UP (ref 3.5–5.3)
POTASSIUM SERPL-SCNC: 4.5 MMOL/L — SIGNIFICANT CHANGE UP (ref 3.5–5.3)
POTASSIUM SERPL-SCNC: 4.5 MMOL/L — SIGNIFICANT CHANGE UP (ref 3.5–5.3)
PROT SERPL-MCNC: 5 G/DL — LOW (ref 6–8.3)
PROT SERPL-MCNC: 5 G/DL — LOW (ref 6–8.3)
RBC # BLD: 3.73 M/UL — LOW (ref 4.2–5.8)
RBC # BLD: 3.73 M/UL — LOW (ref 4.2–5.8)
RBC # FLD: 14.6 % — HIGH (ref 10.3–14.5)
RBC # FLD: 14.6 % — HIGH (ref 10.3–14.5)
SODIUM SERPL-SCNC: 139 MMOL/L — SIGNIFICANT CHANGE UP (ref 135–145)
SODIUM SERPL-SCNC: 139 MMOL/L — SIGNIFICANT CHANGE UP (ref 135–145)
SPECIMEN SOURCE: SIGNIFICANT CHANGE UP
TSH SERPL-MCNC: 3.74 UIU/ML — SIGNIFICANT CHANGE UP (ref 0.27–4.2)
TSH SERPL-MCNC: 3.74 UIU/ML — SIGNIFICANT CHANGE UP (ref 0.27–4.2)
WBC # BLD: 10.6 K/UL — HIGH (ref 3.8–10.5)
WBC # BLD: 10.6 K/UL — HIGH (ref 3.8–10.5)
WBC # FLD AUTO: 10.6 K/UL — HIGH (ref 3.8–10.5)
WBC # FLD AUTO: 10.6 K/UL — HIGH (ref 3.8–10.5)

## 2023-10-31 PROCEDURE — 92960 CARDIOVERSION ELECTRIC EXT: CPT

## 2023-10-31 PROCEDURE — 93010 ELECTROCARDIOGRAM REPORT: CPT

## 2023-10-31 PROCEDURE — 93325 DOPPLER ECHO COLOR FLOW MAPG: CPT | Mod: 26

## 2023-10-31 PROCEDURE — 99232 SBSQ HOSP IP/OBS MODERATE 35: CPT

## 2023-10-31 PROCEDURE — 93320 DOPPLER ECHO COMPLETE: CPT | Mod: 26

## 2023-10-31 PROCEDURE — 93312 ECHO TRANSESOPHAGEAL: CPT | Mod: 26

## 2023-10-31 PROCEDURE — 76377 3D RENDER W/INTRP POSTPROCES: CPT | Mod: 26

## 2023-10-31 RX ORDER — AMIODARONE HYDROCHLORIDE 400 MG/1
TABLET ORAL
Refills: 0 | Status: DISCONTINUED | OUTPATIENT
Start: 2023-10-31 | End: 2023-11-01

## 2023-10-31 RX ORDER — AMIODARONE HYDROCHLORIDE 400 MG/1
400 TABLET ORAL
Refills: 0 | Status: DISCONTINUED | OUTPATIENT
Start: 2023-10-31 | End: 2023-11-01

## 2023-10-31 RX ADMIN — CLOPIDOGREL BISULFATE 75 MILLIGRAM(S): 75 TABLET, FILM COATED ORAL at 12:44

## 2023-10-31 RX ADMIN — APIXABAN 5 MILLIGRAM(S): 2.5 TABLET, FILM COATED ORAL at 17:18

## 2023-10-31 RX ADMIN — CHLORHEXIDINE GLUCONATE 1 APPLICATION(S): 213 SOLUTION TOPICAL at 13:14

## 2023-10-31 RX ADMIN — Medication 200 MILLIGRAM(S): at 12:44

## 2023-10-31 RX ADMIN — PANTOPRAZOLE SODIUM 40 MILLIGRAM(S): 20 TABLET, DELAYED RELEASE ORAL at 05:39

## 2023-10-31 RX ADMIN — Medication 25 MILLIGRAM(S): at 05:39

## 2023-10-31 RX ADMIN — Medication 25 MILLIGRAM(S): at 13:24

## 2023-10-31 RX ADMIN — ATORVASTATIN CALCIUM 80 MILLIGRAM(S): 80 TABLET, FILM COATED ORAL at 22:08

## 2023-10-31 RX ADMIN — SENNA PLUS 2 TABLET(S): 8.6 TABLET ORAL at 22:09

## 2023-10-31 RX ADMIN — APIXABAN 5 MILLIGRAM(S): 2.5 TABLET, FILM COATED ORAL at 05:39

## 2023-10-31 RX ADMIN — AMIODARONE HYDROCHLORIDE 400 MILLIGRAM(S): 400 TABLET ORAL at 17:17

## 2023-10-31 RX ADMIN — AMIODARONE HYDROCHLORIDE 400 MILLIGRAM(S): 400 TABLET ORAL at 13:24

## 2023-10-31 NOTE — PROGRESS NOTE ADULT - SUBJECTIVE AND OBJECTIVE BOX
DATE OF SERVICE: 10-31-23 @ 15:11    Patient is a 78y old  Male who presents with a chief complaint of Chest pain (31 Oct 2023 13:56)      INTERVAL HISTORY:     REVIEW OF SYSTEMS:  CONSTITUTIONAL: No weakness  EYES/ENT: No visual changes;  No throat pain   NECK: No pain or stiffness  RESPIRATORY: No cough, wheezing; No shortness of breath  CARDIOVASCULAR: No chest pain or palpitations  GASTROINTESTINAL: No abdominal  pain. No nausea, vomiting, or hematemesis  GENITOURINARY: No dysuria, frequency or hematuria  NEUROLOGICAL: No stroke like symptoms  SKIN: No rashes    TELEMETRY Personally reviewed:  	  MEDICATIONS:  aMIOdarone    Tablet   Oral   aMIOdarone    Tablet 400 milliGRAM(s) Oral two times a day  metoprolol tartrate 25 milliGRAM(s) Oral three times a day        PHYSICAL EXAM:  T(C): 36.1 (10-31-23 @ 10:20), Max: 36.7 (10-31-23 @ 00:00)  HR: 88 (10-31-23 @ 12:20) (83 - 116)  BP: 118/68 (10-31-23 @ 12:20) (106/68 - 124/64)  RR: 16 (10-31-23 @ 12:20) (16 - 18)  SpO2: 96% (10-31-23 @ 12:20) (96% - 99%)  Wt(kg): --  I&O's Summary    30 Oct 2023 07:01  -  31 Oct 2023 07:00  --------------------------------------------------------  IN: 680 mL / OUT: 1400 mL / NET: -720 mL      Height (cm): 182.9 (10-31 @ 09:30)  Weight (kg): 120.2 (10-31 @ 09:30)  BMI (kg/m2): 35.9 (10-31 @ 09:30)  BSA (m2): 2.4 (10-31 @ 09:30)    Appearance: In no distress	  HEENT:    PERRL, EOMI	  Cardiovascular:  S1 S2, No JVD  Respiratory: Lungs clear to auscultation	  Gastrointestinal:  Soft, Non-tender, + BS	  Vascularature:  No edema of LE  Psychiatric: Appropriate affect   Neuro: no acute focal deficits                               11.1   10.60 )-----------( 417      ( 31 Oct 2023 07:08 )             34.7     10-31    139  |  107  |  47<H>  ----------------------------<  122<H>  4.5   |  18<L>  |  1.16    Ca    8.2<L>      31 Oct 2023 07:06    TPro  5.0<L>  /  Alb  2.6<L>  /  TBili  0.6  /  DBili  x   /  AST  127<H>  /  ALT  110<H>  /  AlkPhos  263<H>  10-31        Labs personally reviewed      ASSESSMENT/PLAN: 	  78 M with hx of CAD s/p NURIA to distal LAD in 6/2023, HTN, HLD, EtOH disorder, T2DM, spinal surgeries, neuroendocrine tumor on Lutathera (first treatment on Friday 10/20 at [facility] [place] presenting with fatigue and chest pain since Friday. Patient reports severe fatigue starting Friday after his first treatment with Lutathera. Pt also reported mid-left lower chest pain described as pressure. No radiation, no aggravating or alleviating factors. Pt has never felt this type of chest pain before. Denies palpitations, diaphoresis, dyspnea, nausea, vomiting. Pt has diffuse abdominal pain. Also with constipation; last BM several days ago. No other acute complaints.      Problem/Plan - 1:  ·  Problem: Chest Pain.   ·  Plan: Location described as LUQ abdomen with pain elicited with palpation, appears noncardiac   - EKG SR with PACs, no ischemic characteristics  - Trop 72-->83--> 81/ CKMB 1.9  - 10/26 reports CP, trop increased to 110 --> 133 likely 2/2 demand i/s/o ST and fever. Trend to peak.   - Mildly elevated LFTS and Lipase noted  - Hx of recent PCI  - Repeat TTE unchanged   - CP possibly 2/2 lutathera with known S/E of abdominal discomfort  - 10/27 CP improved      Problem/Plan - 2:  ·  Problem: Shortness of Breath  ·  Plan: ECG non-ischemic  - TTE wnl.  - BNP 2572. Repeat 4171.  - CT neg for PE, no pulm edema noted  - s/p Lasix 20 IVP once. Will hold further diuretics given RAZIA.      Problem/Plan - 3:  ·  Problem: CAD.   ·  Plan: Recent PCI to pLAD in June 2023  - ECG non-ischemic  - Trop/CKMB as noted above  - c/w Plavix and statin, d/c ASA given starting Eliquis  - TTE unchanged     Problem/Plan - 4:  ·  Problem: RAZIA  ·  Plan: Likely dry, will gentle hydrate   - Creatinine improved, back to baseline     Problem/Plan - 5:  ·  Problem: New Onset Atrial Fibrillation  - As per tele, converted to rapid AF rates 120-140bpm  - Check TSH  - Will increase Toprol to 25mg PO TID, uptitrate to 50mg BID if BP tolerates  - s/p TRACY without thrombus, s/p DCCV with return of sinus  - [id number] of 2. Recommend AC.  - Eliquis 5mg BID             ISAMAR Foster DO [facility]  Cardiovascular Medicine  [address] [place] [address], [address]  Available via call or text on [employer]  Office: [phone]

## 2023-10-31 NOTE — PROGRESS NOTE ADULT - ASSESSMENT
78 M with hx of CAD s/p NURIA to distal LAD in 6/2023, HTN, HLD, EtOH disorder, T2DM, spinal surgeries, neuroendocrine tumor on Lutathera (first treatment on Friday 10/20 at [facility] [place] presenting with fatigue and chest pain, fever + SIRS criteria. Concern for reaction to Lutathera vs progression of disease given start of symptoms

## 2023-10-31 NOTE — PROGRESS NOTE ADULT - SUBJECTIVE AND OBJECTIVE BOX
[facility] Division of Hospital Medicine  Armando Ann  MS Teams      SUBJECTIVE / OVERNIGHT EVENTS:  No events overnight  remained in Afib overnight  denies cp/sob/palpitations  s/p TRACY without thrombus, s/p DCCV with return of sinus    ADDITIONAL REVIEW OF SYSTEMS:    MEDICATIONS  (STANDING):  allopurinol 200 milliGRAM(s) Oral daily  aMIOdarone    Tablet   Oral   aMIOdarone    Tablet 400 milliGRAM(s) Oral two times a day  apixaban 5 milliGRAM(s) Oral two times a day  atorvastatin 80 milliGRAM(s) Oral at bedtime  chlorhexidine 2% Cloths 1 Application(s) Topical daily  clopidogrel Tablet 75 milliGRAM(s) Oral daily  dextrose 5%. 1000 milliLiter(s) (100 mL/Hr) IV Continuous <Continuous>  dextrose 5%. 1000 milliLiter(s) (50 mL/Hr) IV Continuous <Continuous>  dextrose 50% Injectable 25 Gram(s) IV Push once  dextrose 50% Injectable 25 Gram(s) IV Push once  dextrose 50% Injectable 12.5 Gram(s) IV Push once  glucagon  Injectable 1 milliGRAM(s) IntraMuscular once  insulin lispro (ADMELOG) corrective regimen sliding scale   SubCutaneous at bedtime  insulin lispro (ADMELOG) corrective regimen sliding scale   SubCutaneous three times a day before meals  metoprolol tartrate 25 milliGRAM(s) Oral three times a day  pantoprazole    Tablet 40 milliGRAM(s) Oral before breakfast  polyethylene glycol 3350 17 Gram(s) Oral daily  senna 2 Tablet(s) Oral at bedtime    MEDICATIONS  (PRN):  acetaminophen     Tablet .. 975 milliGRAM(s) Oral every 6 hours PRN Temp greater or equal to 38C (100.4F), Moderate Pain (4 - 6)  dextrose Oral Gel 15 Gram(s) Oral once PRN Blood Glucose LESS THAN 70 milliGRAM(s)/deciliter  oxyCODONE    IR 5 milliGRAM(s) Oral every 6 hours PRN Severe Pain (7 - 10)      I&O's Summary    30 Oct 2023 07:01  -  31 Oct 2023 07:00  --------------------------------------------------------  IN: 680 mL / OUT: 1400 mL / NET: -720 mL        PHYSICAL EXAM:  Vital Signs Last 24 Hrs  T(C): 36.1 (31 Oct 2023 10:20), Max: 36.7 (31 Oct 2023 00:00)  T(F): 97 (31 Oct 2023 10:20), Max: 98 (31 Oct 2023 00:00)  HR: 88 (31 Oct 2023 12:20) (83 - 116)  BP: 118/68 (31 Oct 2023 12:20) (106/68 - 124/64)  BP(mean): --  RR: 16 (31 Oct 2023 12:20) (16 - 18)  SpO2: 96% (31 Oct 2023 12:20) (96% - 99%)    Parameters below as of 31 Oct 2023 12:20  Patient On (Oxygen Delivery Method): room air        CONSTITUTIONAL: NAD, well-developed, well-groomed  EYES: PERRLA; conjunctiva and sclera clear  NECK: Supple, no palpable masses  RESPIRATORY: Normal respiratory effort; lungs are clear to auscultation bilaterally  CARDIOVASCULAR: irreg rate and rhythm, normal S1 and S2, no murmur: no LE edema; Peripheral pulses are 2+ bilaterally  ABDOMEN: Nontender to palpation, normoactive bowel sounds, no rebound/guarding  MUSCULOSKELETAL:  no clubbing or cyanosis of digits; no joint swelling or tenderness to palpation  PSYCH: A+O to person, place, and time; affect appropriate  NEUROLOGY: CN 2-12 are intact and symmetric; no gross sensory deficits   SKIN: No rashes; no palpable lesions    LABS:                        11.1   10.60 )-----------( 417      ( 31 Oct 2023 07:08 )             34.7     10-31    139  |  107  |  47<H>  ----------------------------<  122<H>  4.5   |  18<L>  |  1.16    Ca    8.2<L>      31 Oct 2023 07:06    TPro  5.0<L>  /  Alb  2.6<L>  /  TBili  0.6  /  DBili  x   /  AST  127<H>  /  ALT  110<H>  /  AlkPhos  263<H>  10-31          Urinalysis Basic - ( 31 Oct 2023 07:06 )    Color: x / Appearance: x / SG: x / pH: x  Gluc: 122 mg/dL / Ketone: x  / Bili: x / Urobili: x   Blood: x / Protein: x / Nitrite: x   Leuk Esterase: x / RBC: x / WBC x   Sq Epi: x / Non Sq Epi: x / Bacteria: x

## 2023-10-31 NOTE — PROVIDER CONTACT NOTE (OTHER) - REASON
12 beats wide complex on remote tele
6 beats of wide complexes on tele.
17 seconds of SVT
Received notification from Tele statting pt had 4 wide complex beats.
Second episode of 6 beats of wide complexes on tele.

## 2023-10-31 NOTE — PROVIDER CONTACT NOTE (OTHER) - ACTION/TREATMENT ORDERED:
No action or treatment ordered at this time
EKG ordered.
NP Reyesmonegro aware. No interventions at this time . Continue to monitor .
NP Reyesmonegro notified. As pt asymptomatic , no interventions at this time . Continue monitoring on telemetry .
Provider aware

## 2023-10-31 NOTE — PROGRESS NOTE ADULT - SUBJECTIVE AND OBJECTIVE BOX
24H hour events: No over night events.  Denies c/o CP, palpitations or SOB.     MEDICATIONS:  apixaban 5 milliGRAM(s) Oral two times a day  clopidogrel Tablet 75 milliGRAM(s) Oral daily  metoprolol tartrate 25 milliGRAM(s) Oral three times a day    acetaminophen     Tablet .. 975 milliGRAM(s) Oral every 6 hours PRN  oxyCODONE    IR 5 milliGRAM(s) Oral every 6 hours PRN    pantoprazole    Tablet 40 milliGRAM(s) Oral before breakfast  polyethylene glycol 3350 17 Gram(s) Oral daily  senna 2 Tablet(s) Oral at bedtime    allopurinol 200 milliGRAM(s) Oral daily  atorvastatin 80 milliGRAM(s) Oral at bedtime  dextrose 50% Injectable 25 Gram(s) IV Push once  dextrose 50% Injectable 25 Gram(s) IV Push once  dextrose 50% Injectable 12.5 Gram(s) IV Push once  dextrose Oral Gel 15 Gram(s) Oral once PRN  glucagon  Injectable 1 milliGRAM(s) IntraMuscular once  insulin lispro (ADMELOG) corrective regimen sliding scale   SubCutaneous at bedtime  insulin lispro (ADMELOG) corrective regimen sliding scale   SubCutaneous three times a day before meals    chlorhexidine 2% Cloths 1 Application(s) Topical daily  dextrose 5%. 1000 milliLiter(s) IV Continuous <Continuous>  dextrose 5%. 1000 milliLiter(s) IV Continuous <Continuous>      REVIEW OF SYSTEMS:  Complete 12point ROS negative.    PHYSICAL EXAM:  T(C): 36.2 (10-31-23 @ 08:11), Max: 36.7 (10-31-23 @ 00:00)  HR: 83 (10-31-23 @ 08:11) (83 - 100)  BP: 106/68 (10-31-23 @ 08:11) (104/65 - 124/64)  RR: 18 (10-31-23 @ 08:11) (18 - 18)  SpO2: 98% (10-31-23 @ 08:11) (97% - 99%)    30 Oct 2023 07:01  -  31 Oct 2023 07:00  --------------------------------------------------------  IN: 680 mL / OUT: 1400 mL / NET: -720 mL    Appearance: Normal	  HEENT:   Normal oral mucosa, PERRL, EOMI	  Cardiovascular: Normal S1 S2, irregular. No JVD, No murmurs, No edema  Respiratory: Lungs clear to auscultation	  Psychiatry: A & O x 3, Mood & affect appropriate  Gastrointestinal:  Soft, Non-tender, + BS	  Skin: No rashes, No ecchymoses, No cyanosis	  Extremities: Normal range of motion, No clubbing, cyanosis.  + 1BL/LE edema.   Vascular: Peripheral pulses palpable 2+ bilaterally    LABS:	 	    CBC Full  -  ( 31 Oct 2023 07:08 )  WBC Count : 10.60 K/uL  Hemoglobin : 11.1 g/dL  Hematocrit : 34.7 %  Platelet Count - Automated : 417 K/uL  Mean Cell Volume : 93.0 fl  Mean Cell Hemoglobin : 29.8 pg  Mean Cell Hemoglobin Concentration : 32.0 gm/dL  Auto Neutrophil # : 8.64 K/uL  Auto Lymphocyte # : 0.71 K/uL  Auto Monocyte # : 0.95 K/uL  Auto Eosinophil # : 0.13 K/uL  Auto Basophil # : 0.03 K/uL  Auto Neutrophil % : 81.5 %  Auto Lymphocyte % : 6.7 %  Auto Monocyte % : 9.0 %  Auto Eosinophil % : 1.2 %  Auto Basophil % : 0.3 %    10-31    139  |  107  |  47<H>  ----------------------------<  122<H>  4.5   |  18<L>  |  1.16  10-30    138  |  105  |  52<H>  ----------------------------<  121<H>  3.9   |  20<L>  |  1.40<H>    Ca    8.2<L>      31 Oct 2023 07:06  Ca    9.0      30 Oct 2023 07:11    TPro  5.0<L>  /  Alb  2.6<L>  /  TBili  0.6  /  DBili  x   /  AST  127<H>  /  ALT  110<H>  /  AlkPhos  263<H>  10-31  TPro  6.1  /  Alb  2.4<L>  /  TBili  0.5  /  DBili  x   /  AST  109<H>  /  ALT  89<H>  /  AlkPhos  237<H>  10-30      TELEMETRY: 	  AFib 's

## 2023-10-31 NOTE — PROGRESS NOTE ADULT - PROBLEM SELECTOR PLAN 1
w/ RVR  d/w cardiology  c/w  metoprolol to 25mg TID, required dig x 1 10/27/23  eliquis 5 BID for a/c - monitor cr  stopped ASA and continued plavix to avoid triple therapy  s/p TRACY with DCCV with return of sinus  amio 400mg BID *7 days, then 400mg daily for 7 days, followed by 200mg daily  will decrease metop as needed

## 2023-10-31 NOTE — PROVIDER CONTACT NOTE (OTHER) - BACKGROUND
a-fib, CHF, fever unspecified
Pt admitted for fever,  fatigue , epigastric discomfort after radiation therapy. Neuroendocrine ca of liver.
Pt admitted with fever, fatigue , chest pain after radiation therapy. trop t elevated on admission. h/o neuroendocrine ca of liver.
Pt. Admitted with fever due to unspecified condition, PMHX of neuroendocrine carcinoma, new onset Afib, CAD. Pt. s/p cardioversion today.
Pt has CAD s/p NURIA to distal LAD, HTN, and A. Fib, and PAD who was admitted with fever and SIRS of unclear etiology

## 2023-10-31 NOTE — PROGRESS NOTE ADULT - ASSESSMENT
NANDO HERNANDEZ is a 78y Male who presents with a chief complaint of chest pain    Metastatic Pancreatic Neuroendocrine Tumor  ·	Patient follows with Dr. Sophia Prasad, [facility].  ·	Recent progression of disease; last received Lutetium Edith-177 LUTATHERA on October 20th.  ·	No systemic therapy while inpatient or during rehabilitation    Anemia  ·	In the setting of inflammation, active malignancy  ·	Hemoglobin has been stable.  ·	Monitor and maintain HGB > 7    Chest Pain  Dyspnea  ·	Possibly due to abdominal pain which can occur with LUTATHERA  ·	Echocardiogram with no obvious abnormalities. EKG non-ischemic  ·	Diuretics on hold due to kidney injury.  ·	Follow-up cardiology    Acute Kidney Injury  ·	In the setting of recent diuresis. Received IV fluids.  ·	Creatinine better    Atrial Fibrillation  ·	Patient is now on apixaban and clopidogrel.  ·	Also on rate control with metoprolol  ·	S/p TRACY/DCCV today  ·	Platelets unlikely to decrease from Lutathera, and have been normal on 10/3 and 10/20. May continue eliquis.    Will continue to follow.    Hugo Topete PA-C  Hematology/Oncology  [facility]  [phone] (office) NANDO HERNANDEZ is a 78y Male who presents with a chief complaint of chest pain    Metastatic Pancreatic Neuroendocrine Tumor  ·	Patient follows with Dr. Sophia Prasad, [facility].  ·	Recent progression of disease; last received Lutetium Edith-177 LUTATHERA on October 20th.  ·	No systemic therapy while inpatient or during rehabilitation    Anemia  ·	In the setting of inflammation, active malignancy  ·	Hemoglobin has been stable.  ·	Monitor and maintain HGB > 7    Chest Pain  Dyspnea  ·	Possibly due to abdominal pain which can occur with LUTATHERA  ·	Echocardiogram with no obvious abnormalities. EKG non-ischemic  ·	Diuretics on hold due to kidney injury.  ·	Follow-up cardiology    Acute Kidney Injury  ·	In the setting of recent diuresis. Received IV fluids.  ·	Creatinine better    Atrial Fibrillation  ·	Patient is now on apixaban and clopidogrel.  ·	Also on rate control with metoprolol  ·	S/p TRACY/DCCV today  ·	Grade 3 and 4 thrombocytopenia is rare with Lutathera (1%), and have been normal on 10/3 and 10/20. May continue eliquis.  ·	There is 5 % risk of Afib with Luthatera, spoke to daughter about this and further rechallenge TBD at [facility],    Will continue to follow.    Hugo Topete PA-C  Hematology/Oncology  [facility]  [phone] (office)

## 2023-10-31 NOTE — PROGRESS NOTE ADULT - PROBLEM SELECTOR PLAN 2
concern for treatment reaction vs progression of disease,     infectious etiology unlikely  onc eval appreciated  pain control- palliative eval, d/w pt he agrees but declined pain meds after discussion    fevers resolved   no DVT on doppler

## 2023-10-31 NOTE — PROVIDER CONTACT NOTE (OTHER) - ASSESSMENT
See vs flowsheet, pt asymptomatic.
Pt. A&Ox4, VSS. pt. has no complaints of chest pain
see vs flowsheet. Pt asymptomatic.
Pt is asymptomatic and denies chest pain
patient denies chest pain

## 2023-10-31 NOTE — PRE-ANESTHESIA EVALUATION ADULT - NSANTHPMHFT_GEN_ALL_CORE
79 yo M w/ PMHx of CAD s/p NURIA to distal LAD in 6/2023, HTN, HLD, prior EtOH disorder (denies current use), T2DM, Scoliosis, spinal surgeries, Metastatic Pancreatic neuroendocrine tumor on Lutathera (first treatment on Friday 10/20 at [facility] [place] presenting with fatigue and chest pain since Friday. Patient reports severe fatigue starting Friday after his first treatment with Lutathera. Pt also reported mid-left lower chest pain described as pressure. No radiation, no aggravating or alleviating factors. Pt has never felt this type of chest pain before. Denies palpitations, diaphoresis, dyspnea, nausea, vomiting. Pt has diffuse abdominal pain. Also with constipation; last BM several days ago. No other acute complaints.  EP consulted for  new onset AFib started on 10/26/23 in-patient with RVR.  Presents now for TRACY, poss DCCV.    Denies active CP/SOB/Orthopnea

## 2023-10-31 NOTE — PROGRESS NOTE ADULT - SUBJECTIVE AND OBJECTIVE BOX
Patient is a 78y old  Male who presents with a chief complaint of Chest pain (31 Oct 2023 09:32)    Pt seen and examined at bedside.     MEDICATIONS  (STANDING):  allopurinol 200 milliGRAM(s) Oral daily  aMIOdarone    Tablet   Oral   aMIOdarone    Tablet 400 milliGRAM(s) Oral two times a day  apixaban 5 milliGRAM(s) Oral two times a day  atorvastatin 80 milliGRAM(s) Oral at bedtime  chlorhexidine 2% Cloths 1 Application(s) Topical daily  clopidogrel Tablet 75 milliGRAM(s) Oral daily  dextrose 5%. 1000 milliLiter(s) (100 mL/Hr) IV Continuous <Continuous>  dextrose 5%. 1000 milliLiter(s) (50 mL/Hr) IV Continuous <Continuous>  dextrose 50% Injectable 25 Gram(s) IV Push once  dextrose 50% Injectable 12.5 Gram(s) IV Push once  dextrose 50% Injectable 25 Gram(s) IV Push once  glucagon  Injectable 1 milliGRAM(s) IntraMuscular once  insulin lispro (ADMELOG) corrective regimen sliding scale   SubCutaneous at bedtime  insulin lispro (ADMELOG) corrective regimen sliding scale   SubCutaneous three times a day before meals  metoprolol tartrate 25 milliGRAM(s) Oral three times a day  pantoprazole    Tablet 40 milliGRAM(s) Oral before breakfast  polyethylene glycol 3350 17 Gram(s) Oral daily  senna 2 Tablet(s) Oral at bedtime    MEDICATIONS  (PRN):  acetaminophen     Tablet .. 975 milliGRAM(s) Oral every 6 hours PRN Temp greater or equal to 38C (100.4F), Moderate Pain (4 - 6)  dextrose Oral Gel 15 Gram(s) Oral once PRN Blood Glucose LESS THAN 70 milliGRAM(s)/deciliter  oxyCODONE    IR 5 milliGRAM(s) Oral every 6 hours PRN Severe Pain (7 - 10)        Vital Signs Last 24 Hrs  T(C): 36.1 (31 Oct 2023 10:20), Max: 36.7 (31 Oct 2023 00:00)  T(F): 97 (31 Oct 2023 10:20), Max: 98 (31 Oct 2023 00:00)  HR: 88 (31 Oct 2023 12:20) (83 - 116)  BP: 118/68 (31 Oct 2023 12:20) (104/65 - 124/64)  BP(mean): --  RR: 16 (31 Oct 2023 12:20) (16 - 18)  SpO2: 96% (31 Oct 2023 12:20) (96% - 99%)    Parameters below as of 31 Oct 2023 12:20  Patient On (Oxygen Delivery Method): room air        PE  NAD  Awake, alert  Anicteric, MMM  No c/c/e  No rash grossly  FROM                          11.1   10.60 )-----------( 417      ( 31 Oct 2023 07:08 )             34.7       10-31    139  |  107  |  47<H>  ----------------------------<  122<H>  4.5   |  18<L>  |  1.16    Ca    8.2<L>      31 Oct 2023 07:06    TPro  5.0<L>  /  Alb  2.6<L>  /  TBili  0.6  /  DBili  x   /  AST  127<H>  /  ALT  110<H>  /  AlkPhos  263<H>  10-31

## 2023-10-31 NOTE — PRE-ANESTHESIA EVALUATION ADULT - BMI (KG/M2)
[FreeTextEntry1] :  Documented by Kb Soriano acting as a scribe for Dr. Kofi Leyva on 12/19/2019. 
35.9

## 2023-10-31 NOTE — PROGRESS NOTE ADULT - PROBLEM SELECTOR PLAN 4
was already increasing but worsened after torsemide started  now d/c'ed by cards  Labs evaluated with worsening Cr and transaminases concerning for low volume state, discussed with Dr. Hayes and s/p gentle hydration with LR @75 for 10 hours on 10/28/23 with improvement in creatinine; appears relatively euvolemic today. Assess fluid status, creatinine and need for further fluids daily

## 2023-10-31 NOTE — PROVIDER CONTACT NOTE (OTHER) - SITUATION
12 beats wide complex on remote tele
Pt is on Tele monitoring for A. Fib
Second episode of 6 beats of wide complexes on tele.
6 beats of wide complexes on tele. hr in 100 - 110/ m
tele monitor informed writer patient has 17 sec of SVT

## 2023-10-31 NOTE — PROGRESS NOTE ADULT - ASSESSMENT
78 year old Male with hx of CAD s/p NURIA to distal LAD in 6/2023, HTN, HLD, prior EtOH disorder (denies current use), T2DM, Scoliosis, spinal surgeries, Metastatic Pancreatic neuroendocrine tumor on Lutathera (first treatment on Friday 10/20 at [facility] [place]), presenting with fatigue and chest pain since Friday. Patient reports severe fatigue starting Friday after his first treatment with Lutathera. Denies CP, palpitations, diaphoresis, dyspnea, nausea, vomiting.  EP consulted for new onset AFib started on 10/26/23 in-patient with RVR.      1. New Onset AFib with RVR  2. Metastatic Pancreatic neuroendocrine tumor  3. Elevated LFT's     - Per tele review, patient has been in AFib since 10/26.  Since admission patient has been having PAF, PAC's.   - Continue Eliquis 5mg PO BID without interruption  Discussed with Heme/ Onc team, no prior thrombocytopenia on Lutathera    - Plan for TRACY/DCCV today, likely start antiarrythmic post   - Repeat LFT's today ,    - Check TSH this AM- in progress       HASEEB Jha [facility]  [phone]   78 year old Male with hx of CAD s/p NURIA to distal LAD in 6/2023, HTN, HLD, prior EtOH disorder (denies current use), T2DM, Scoliosis, spinal surgeries, Metastatic Pancreatic neuroendocrine tumor on Lutathera (first treatment on Friday 10/20 at [facility] [place] presenting with fatigue and chest pain since Friday. Patient reports severe fatigue starting Friday after his first treatment with Lutathera. Denies CP, palpitations, diaphoresis, dyspnea, nausea, vomiting.  EP consulted for new onset AFib started on 10/26/23 in-patient with RVR.      1. New Onset AFib with RVR  2. Metastatic Pancreatic neuroendocrine tumor  3. Elevated LFT's     - Per tele review, patient has been in AFib since 10/26.  Since admission patient has been having PAF, PAC's.   - Continue Eliquis 5mg PO BID without interruption  Discussed with Heme/ Onc team, no prior thrombocytopenia on Lutathera    - Plan for TRACY/DCCV today, likely start antiarrythmic post   - Repeat LFT's today ,    - TSH WNL      L. Abhijeet [facility]  [phone]   78 year old Male with hx of CAD s/p NURIA to distal LAD in 6/2023, HTN, HLD, prior EtOH disorder (denies current use), T2DM, Scoliosis, spinal surgeries, Metastatic Pancreatic neuroendocrine tumor on Lutathera ([place] [facility] [place] presenting with fatigue and chest pain since Friday. Patient reports severe fatigue starting Friday after his first treatment with Lutathera. Denies CP, palpitations, diaphoresis, dyspnea, nausea, vomiting.  EP consulted for new onset AFib started on 10/26/23 in-patient with RVR.      1. New Onset AFib with RVR  2. Metastatic Pancreatic neuroendocrine tumor  3. Elevated LFT's     - Per tele review, patient has been in AFib since 10/26.  Since admission patient has been having PAF, PAC's.   - Continue Eliquis 5mg PO BID without interruption  Discussed with Heme/ Onc team, no prior thrombocytopenia on Lutathera    - Plan for TRACY/DCCV today, likely start antiarrythmic post   - Repeat LFT's today ,    - TSH WNL      L. Fuadsarah [facility]  [phone]      Addendum- patient is s/p successful TRACY/DCCV today back to NSR 70's with APC's.  Will recommend starting antiarrythmic Amiodarone to maintain NSR.  Start Amiodarone 40mmg PO BID for 1 week, then 400mg PO daily for one week, then 200mg PO daily thereafter.  While on Amiodarone will need monitoring of TSH, LFT's in patient, will need outpatient monitoring of TSH, LFT's, Opthalmology evaluation and Pulmonary function tests. Above recommendations reviewed with Medicine ACP.  ,  today and TSH WNL.     [phone]

## 2023-11-01 ENCOUNTER — NON-APPOINTMENT (OUTPATIENT)
Age: 78
End: 2023-11-01

## 2023-11-01 LAB
ALBUMIN SERPL ELPH-MCNC: 2.6 G/DL — LOW (ref 3.3–5)
ALBUMIN SERPL ELPH-MCNC: 2.6 G/DL — LOW (ref 3.3–5)
ALP SERPL-CCNC: 236 U/L — HIGH (ref 40–120)
ALP SERPL-CCNC: 236 U/L — HIGH (ref 40–120)
ALT FLD-CCNC: 115 U/L — HIGH (ref 10–45)
ALT FLD-CCNC: 115 U/L — HIGH (ref 10–45)
ANION GAP SERPL CALC-SCNC: 15 MMOL/L — SIGNIFICANT CHANGE UP (ref 5–17)
ANION GAP SERPL CALC-SCNC: 15 MMOL/L — SIGNIFICANT CHANGE UP (ref 5–17)
AST SERPL-CCNC: 131 U/L — HIGH (ref 10–40)
AST SERPL-CCNC: 131 U/L — HIGH (ref 10–40)
BILIRUB SERPL-MCNC: 0.5 MG/DL — SIGNIFICANT CHANGE UP (ref 0.2–1.2)
BILIRUB SERPL-MCNC: 0.5 MG/DL — SIGNIFICANT CHANGE UP (ref 0.2–1.2)
BUN SERPL-MCNC: 41 MG/DL — HIGH (ref 7–23)
BUN SERPL-MCNC: 41 MG/DL — HIGH (ref 7–23)
CALCIUM SERPL-MCNC: 9.1 MG/DL — SIGNIFICANT CHANGE UP (ref 8.4–10.5)
CALCIUM SERPL-MCNC: 9.1 MG/DL — SIGNIFICANT CHANGE UP (ref 8.4–10.5)
CHLORIDE SERPL-SCNC: 106 MMOL/L — SIGNIFICANT CHANGE UP (ref 96–108)
CHLORIDE SERPL-SCNC: 106 MMOL/L — SIGNIFICANT CHANGE UP (ref 96–108)
CO2 SERPL-SCNC: 18 MMOL/L — LOW (ref 22–31)
CO2 SERPL-SCNC: 18 MMOL/L — LOW (ref 22–31)
CREAT SERPL-MCNC: 1.03 MG/DL — SIGNIFICANT CHANGE UP (ref 0.5–1.3)
CREAT SERPL-MCNC: 1.03 MG/DL — SIGNIFICANT CHANGE UP (ref 0.5–1.3)
EGFR: 74 ML/MIN/1.73M2 — SIGNIFICANT CHANGE UP
EGFR: 74 ML/MIN/1.73M2 — SIGNIFICANT CHANGE UP
GLUCOSE BLDC GLUCOMTR-MCNC: 108 MG/DL — HIGH (ref 70–99)
GLUCOSE BLDC GLUCOMTR-MCNC: 108 MG/DL — HIGH (ref 70–99)
GLUCOSE BLDC GLUCOMTR-MCNC: 119 MG/DL — HIGH (ref 70–99)
GLUCOSE BLDC GLUCOMTR-MCNC: 119 MG/DL — HIGH (ref 70–99)
GLUCOSE BLDC GLUCOMTR-MCNC: 123 MG/DL — HIGH (ref 70–99)
GLUCOSE BLDC GLUCOMTR-MCNC: 123 MG/DL — HIGH (ref 70–99)
GLUCOSE BLDC GLUCOMTR-MCNC: 129 MG/DL — HIGH (ref 70–99)
GLUCOSE BLDC GLUCOMTR-MCNC: 129 MG/DL — HIGH (ref 70–99)
GLUCOSE SERPL-MCNC: 120 MG/DL — HIGH (ref 70–99)
GLUCOSE SERPL-MCNC: 120 MG/DL — HIGH (ref 70–99)
MAGNESIUM SERPL-MCNC: 1.9 MG/DL — SIGNIFICANT CHANGE UP (ref 1.6–2.6)
MAGNESIUM SERPL-MCNC: 1.9 MG/DL — SIGNIFICANT CHANGE UP (ref 1.6–2.6)
PHOSPHATE SERPL-MCNC: 3.1 MG/DL — SIGNIFICANT CHANGE UP (ref 2.5–4.5)
PHOSPHATE SERPL-MCNC: 3.1 MG/DL — SIGNIFICANT CHANGE UP (ref 2.5–4.5)
POTASSIUM SERPL-MCNC: 4.1 MMOL/L — SIGNIFICANT CHANGE UP (ref 3.5–5.3)
POTASSIUM SERPL-MCNC: 4.1 MMOL/L — SIGNIFICANT CHANGE UP (ref 3.5–5.3)
POTASSIUM SERPL-SCNC: 4.1 MMOL/L — SIGNIFICANT CHANGE UP (ref 3.5–5.3)
POTASSIUM SERPL-SCNC: 4.1 MMOL/L — SIGNIFICANT CHANGE UP (ref 3.5–5.3)
PROT SERPL-MCNC: 6.1 G/DL — SIGNIFICANT CHANGE UP (ref 6–8.3)
PROT SERPL-MCNC: 6.1 G/DL — SIGNIFICANT CHANGE UP (ref 6–8.3)
SODIUM SERPL-SCNC: 139 MMOL/L — SIGNIFICANT CHANGE UP (ref 135–145)
SODIUM SERPL-SCNC: 139 MMOL/L — SIGNIFICANT CHANGE UP (ref 135–145)

## 2023-11-01 PROCEDURE — 99232 SBSQ HOSP IP/OBS MODERATE 35: CPT

## 2023-11-01 PROCEDURE — 93010 ELECTROCARDIOGRAM REPORT: CPT | Mod: 76

## 2023-11-01 PROCEDURE — 99233 SBSQ HOSP IP/OBS HIGH 50: CPT | Mod: FS

## 2023-11-01 RX ORDER — SOTALOL HCL 120 MG
80 TABLET ORAL
Refills: 0 | Status: DISCONTINUED | OUTPATIENT
Start: 2023-11-01 | End: 2023-11-03

## 2023-11-01 RX ORDER — METOPROLOL TARTRATE 50 MG
25 TABLET ORAL DAILY
Refills: 0 | Status: DISCONTINUED | OUTPATIENT
Start: 2023-11-02 | End: 2023-11-04

## 2023-11-01 RX ADMIN — Medication 80 MILLIGRAM(S): at 14:28

## 2023-11-01 RX ADMIN — SENNA PLUS 2 TABLET(S): 8.6 TABLET ORAL at 21:45

## 2023-11-01 RX ADMIN — CLOPIDOGREL BISULFATE 75 MILLIGRAM(S): 75 TABLET, FILM COATED ORAL at 11:11

## 2023-11-01 RX ADMIN — APIXABAN 5 MILLIGRAM(S): 2.5 TABLET, FILM COATED ORAL at 17:37

## 2023-11-01 RX ADMIN — PANTOPRAZOLE SODIUM 40 MILLIGRAM(S): 20 TABLET, DELAYED RELEASE ORAL at 05:50

## 2023-11-01 RX ADMIN — Medication 25 MILLIGRAM(S): at 05:50

## 2023-11-01 RX ADMIN — CHLORHEXIDINE GLUCONATE 1 APPLICATION(S): 213 SOLUTION TOPICAL at 11:47

## 2023-11-01 RX ADMIN — Medication 200 MILLIGRAM(S): at 11:11

## 2023-11-01 RX ADMIN — AMIODARONE HYDROCHLORIDE 400 MILLIGRAM(S): 400 TABLET ORAL at 05:50

## 2023-11-01 RX ADMIN — APIXABAN 5 MILLIGRAM(S): 2.5 TABLET, FILM COATED ORAL at 05:50

## 2023-11-01 RX ADMIN — ATORVASTATIN CALCIUM 80 MILLIGRAM(S): 80 TABLET, FILM COATED ORAL at 21:45

## 2023-11-01 NOTE — PHYSICAL THERAPY INITIAL EVALUATION ADULT - NSPTDISCHREC_GEN_A_CORE
pt requires no skilled PT at this time. pt is functionally independent, ALDAIR Yeung aware./No skilled PT needs

## 2023-11-01 NOTE — PROGRESS NOTE ADULT - PROBLEM SELECTOR PLAN 2
concern for treatment reaction vs progression of disease,     infectious etiology unlikely  onc eval appreciated  pain control- palliative eval, d/w pt he agrees but declined pain meds after discussion  no DVT on doppler    resolved

## 2023-11-01 NOTE — PHYSICAL THERAPY INITIAL EVALUATION ADULT - ACTIVE RANGE OF MOTION EXAMINATION, REHAB EVAL
shaylee. upper extremity Active ROM was WNL (within normal limits)/bilateral lower extremity Active ROM was WNL (within normal limits)

## 2023-11-01 NOTE — PROGRESS NOTE ADULT - ASSESSMENT
78 M with hx of CAD s/p NURIA to distal LAD in 6/2023, HTN, HLD, EtOH disorder, T2DM, spinal surgeries, neuroendocrine tumor on Lutathera (first treatment on Friday 10/20 at [facility] [place]), presenting with fatigue and chest pain, fever + SIRS criteria. Concern for reaction to Lutathera vs progression of disease given start of symptoms. C/B afib with RVR, s/p DCCV. Trialing sotalol.

## 2023-11-01 NOTE — PROGRESS NOTE ADULT - ASSESSMENT
78 year old Male with hx of CAD s/p NURIA to distal LAD in 6/2023, HTN, HLD, prior EtOH disorder (denies current use), T2DM, Scoliosis, spinal surgeries, Metastatic Pancreatic neuroendocrine tumor on Lutathera (first treatment on Friday 10/20 at [facility] [place] presenting with fatigue and chest pain since Friday. Patient reports severe fatigue starting Friday after his first treatment with Lutathera. Denies CP, palpitations, diaphoresis, dyspnea, nausea, vomiting.  EP consulted for new onset AFib started on 10/26/23 in-patient with RVR.      1. New Onset AFib with RVR  2. Metastatic Pancreatic neuroendocrine tumor  3. Elevated LFT's     - Per tele review, patient has been in AFib since 10/26.  Since admission patient has been having PAF, PAC's.   - Continue Eliquis 5mg PO BID without interruption  Discussed with Heme/ Onc team, no prior thrombocytopenia on Lutathera    - Plan for TRACY/DCCV today, likely start antiarrythmic post   - Repeat LFT's today ,    - TSH WNL    Addendum- patient is s/p successful TRACY/DCCV today back to NSR 70's with APC's.  Will recommend starting antiarrythmic Amiodarone to maintain NSR.  Start Amiodarone 40mmg PO BID for 1 week, then 400mg PO daily for one week, then 200mg PO daily thereafter.  While on Amiodarone will need monitoring of TSH, LFT's in patient, will need outpatient monitoring of TSH, LFT's, Opthalmology evaluation and Pulmonary function tests. Above recommendations reviewed with Medicine ACP.  ,  today and TSH WNL.     s/p DCCV, start Amio, monitor LFTs.    78 year old Male with hx of CAD s/p NURIA to distal LAD in 6/2023, HTN, HLD, prior EtOH disorder (denies current use), T2DM, Scoliosis, spinal surgeries, Metastatic Pancreatic neuroendocrine tumor on Lutathera (first treatment on 10/20/23 at [facility] [place] presenting with fatigue and chest pain. EP consulted for new onset AFib started on 10/26/23 in-patient with RVR. Now s/p successful TRACY/DCCV on 10/31/23 to restore sinus rhythm and started on amiodarone post DCCV.    1. New Onset AFib with RVR  2. Metastatic Pancreatic neuroendocrine tumor  3. Elevated LFT's    - Maintaining SR post DCCV   - Continue amiodarone load as ordered (400mg BID x 7 days, 400mg QD x 7 days, then 200mg QD thereafter)  - I discussed with patient plans for short term amiodarone including risks/side effects. Discussed need for outpatient monitoring of PFT/TFT/LFT/opthalmology monitoring while on amiodarone.  - Monitor LFTs (,  today)  - TSH WNL 3.74  - Continue Eliquis 5mg PO BID without interruption  - Discussed with Heme/ Onc team, no prior thrombocytopenia on Lutathera      ALISTAIR Holloway, NP-C  [id number]             78 year old Male with hx of CAD s/p NURIA to distal LAD in 6/2023, HTN, HLD, prior EtOH disorder (denies current use), T2DM, Scoliosis, spinal surgeries, Metastatic Pancreatic neuroendocrine tumor on Lutathera (first treatment on 10/20/23 at [facility] [place]), presenting with fatigue and chest pain. EP consulted for new onset AFib started on 10/26/23 in-patient with RVR. Now s/p successful TRACY/DCCV on 10/31/23 to restore sinus rhythm and started on amiodarone post DCCV.    1. New Onset AFib with RVR  2. Metastatic Pancreatic neuroendocrine tumor  3. Elevated LFT's    - Maintaining SR post DCCV   - Continue amiodarone load as ordered (400mg BID x 7 days, 400mg QD x 7 days, then 200mg QD thereafter)  - I discussed with patient plans for short term amiodarone including risks/side effects. Discussed need for outpatient monitoring of PFT/TFT/LFT/opthalmology monitoring while on amiodarone.  - Monitor LFTs (,  today)  - TSH WNL 3.74  - Continue Eliquis 5mg PO BID without interruption  - Discussed with Heme/ Onc team, no prior thrombocytopenia on Lutathera      Addendum: Discussed with pt's outpatient cardiologist (Dr. Hameed) length, he prefers other class III AAD over amiodarone given transaminitis. Will stop amiodarone and start sotalol 80mg Q12hr. Hold sotalol for QTc> 500ms (baseline QTc 449ms). Obtain ECG 2 hours after each dose of sotalol for initial 5 doses. Will change metoprolol to metoprolol succinate 25mg QD. Patient is aware that he needs to be monitored for 3 days for sotalol. There is no interaction between sotalol and Lutathera. Supplement to ensure K>4.0, Mg>2.0. Patient would need to be transferred to regular telemetry floor. Plan discussed with Medicine ACP.       ALISTAIR Holloway NP-C  [id number]             78 year old Male with hx of CAD s/p NURIA to distal LAD in 6/2023, HTN, HLD, prior EtOH disorder (denies current use), T2DM, Scoliosis, spinal surgeries, Metastatic Pancreatic neuroendocrine tumor on Lutathera (first treatment on 10/20/23 at [facility]), presenting with fatigue and chest pain. EP consulted for new onset AFib started on 10/26/23 in-patient with RVR. Now s/p successful TRACY/DCCV on 10/31/23 to restore sinus rhythm and started on amiodarone post DCCV.    1. New Onset AFib with RVR  2. Metastatic Pancreatic neuroendocrine tumor  3. Elevated LFT's    - Maintaining SR post DCCV   - TSH WNL 3.74  - Continue Eliquis 5mg PO BID without interruption  - Discussed with Heme/ Onc team, no prior thrombocytopenia on Lutathera      Addendum: Discussed with pt's outpatient cardiologist (Dr. Hameed) length, he prefers other class III AAD over amiodarone given transaminitis. Will stop amiodarone and start sotalol 80mg Q12hr. Hold sotalol for QTc> 500ms (baseline QTc 449ms). Obtain ECG 2 hours after each dose of sotalol for initial 5 doses. Will change metoprolol to metoprolol succinate 25mg QD. Patient is aware that he needs to be monitored for 3 days for sotalol. There is no interaction between sotalol and Lutathera. Supplement to ensure K>4.0, Mg>2.0. Patient would need to be transferred to regular telemetry floor. Plan discussed with Medicine ACP.       ALISTAIR Holloway NP-C  [id number]

## 2023-11-01 NOTE — PROGRESS NOTE ADULT - PROBLEM SELECTOR PLAN 8
DVT ppx: eliquis   Diet: DASH, CC, added glucerna  Dispo: pending
DVT ppx: eliquis   Diet: DASH, CC, added glucerna  Dispo: likely tomorrow pending sustained sinus rhythm
DVT ppx: eliquis   Diet: DASH, CC,  Dispo: pending sotalol initiation
DVT ppx: eliquis   Diet: DASH, CC, added glucerna  Dispo: pending
DVT ppx: eliquis   Diet: DASH, CC, added glucerna  Dispo: pending
DVT ppx: lovenox subq  Diet: DASH, CC  Dispo: pending

## 2023-11-01 NOTE — PROGRESS NOTE ADULT - SUBJECTIVE AND OBJECTIVE BOX
DATE OF SERVICE: 11-01-23 @ 17:45    Patient is a 78y old  Male who presents with a chief complaint of Chest pain (01 Nov 2023 12:10)      INTERVAL HISTORY: Feels ok.     REVIEW OF SYSTEMS:  CONSTITUTIONAL: No weakness  EYES/ENT: No visual changes;  No throat pain   NECK: No pain or stiffness  RESPIRATORY: No cough, wheezing; No shortness of breath  CARDIOVASCULAR: No chest pain or palpitations  GASTROINTESTINAL: No abdominal  pain. No nausea, vomiting, or hematemesis  GENITOURINARY: No dysuria, frequency or hematuria  NEUROLOGICAL: No stroke like symptoms  SKIN: No rashes    TELEMETRY Personally reviewed: SR 80-90; 8 sec of AF, 7 beats of WCT  	  MEDICATIONS:  sotalol 80 milliGRAM(s) Oral <User Schedule>        PHYSICAL EXAM:  T(C): 36.4 (11-01-23 @ 13:01), Max: 37.2 (11-01-23 @ 04:37)  HR: 82 (11-01-23 @ 14:24) (80 - 86)  BP: 113/64 (11-01-23 @ 14:24) (108/60 - 156/81)  RR: 18 (11-01-23 @ 13:01) (18 - 18)  SpO2: 98% (11-01-23 @ 14:24) (96% - 100%)  Wt(kg): --  I&O's Summary    31 Oct 2023 07:01  -  01 Nov 2023 07:00  --------------------------------------------------------  IN: 240 mL / OUT: 1000 mL / NET: -760 mL          Appearance: In no distress	  HEENT:    PERRL, EOMI	  Cardiovascular:  S1 S2, No JVD  Respiratory: Lungs clear to auscultation	  Gastrointestinal:  Soft, Non-tender, + BS	  Vascularature:  No edema of LE  Psychiatric: Appropriate affect   Neuro: no acute focal deficits                               11.1   10.60 )-----------( 417      ( 31 Oct 2023 07:08 )             34.7     11-01    139  |  106  |  41<H>  ----------------------------<  120<H>  4.1   |  18<L>  |  1.03    Ca    9.1      01 Nov 2023 07:16  Phos  3.1     11-01  Mg     1.9     11-01    TPro  6.1  /  Alb  2.6<L>  /  TBili  0.5  /  DBili  x   /  AST  131<H>  /  ALT  115<H>  /  AlkPhos  236<H>  11-01        Labs personally reviewed      ASSESSMENT/PLAN: 	    78 M with hx of CAD s/p NURIA to distal LAD in 6/2023, HTN, HLD, EtOH disorder, T2DM, spinal surgeries, neuroendocrine tumor on Lutathera (first [place] [facility] [place] presenting with fatigue and chest pain since Friday. Patient reports severe fatigue starting Friday after his first treatment with Lutathera. Pt also reported mid-left lower chest pain described as pressure. No radiation, no aggravating or alleviating factors. Pt has never felt this type of chest pain before. Denies palpitations, diaphoresis, dyspnea, nausea, vomiting. Pt has diffuse abdominal pain. Also with constipation; last BM several days ago. No other acute complaints.      Problem/Plan - 1:  ·  Problem: Chest Pain.   ·  Plan: Location described as LUQ abdomen with pain elicited with palpation, appears noncardiac   - EKG SR with PACs, no ischemic characteristics  - Trop 72-->83--> 81/ CKMB 1.9  - 10/26 reports CP, trop increased to 110 --> 133 likely 2/2 demand i/s/o ST and fever. Trend to peak.   - Mildly elevated LFTS and Lipase noted  - Hx of recent PCI  - Repeat TTE unchanged   - CP possibly 2/2 lutathera with known S/E of abdominal discomfort  - 10/27 CP improved      Problem/Plan - 2:  ·  Problem: Shortness of Breath  ·  Plan: ECG non-ischemic  - TTE wnl.  - BNP 2572. Repeat 4171.  - CT neg for PE, no pulm edema noted  - s/p Lasix 20 IVP once. Will hold further diuretics given RAZIA.   - Cr now wnl. Will slowly re-implement home diuretics.      Problem/Plan - 3:  ·  Problem: CAD.   ·  Plan: Recent PCI to pLAD in June 2023  - ECG non-ischemic  - Trop/CKMB as noted above  - c/w Plavix and statin, d/c ASA given starting Eliquis  - TTE unchanged     Problem/Plan - 4:  ·  Problem: RAZIA  ·  Plan: Likely dry, will gentle hydrate   - Creatinine improved, back to baseline     Problem/Plan - 5:  ·  Problem: New Onset Atrial Fibrillation  - As per tele, converted to rapid AF rates 120-140bpm  - TSH wnl  - s/p TRACY without thrombus, s/p DCCV with return of sinus  - [id number] of 2. Cont Eliquis 5mg BID  - Cont Toprol 25mg PO daily  - Initiation of Sotalol as per EP--> monitor qTC             Sulma Espinosa, ROSIO-NP   Hank Hayes DO [facility]  Cardiovascular Medicine  [address] [place] [address], [address]  Available through call or text on [employer]  Office: [phone]

## 2023-11-01 NOTE — PROGRESS NOTE ADULT - ASSESSMENT
NANDO HERNANDEZ is a 78y Male who presents with a chief complaint of chest pain.    Metastatic Pancreatic Neuroendocrine Tumor  ·	Patient follows with Dr. Sophia Prasad, [facility].  ·	Recent progression of disease; last received Lutetium Edith-177 LUTATHERA on October 20th.  ·	No systemic therapy while inpatient or during rehabilitation    Anemia  ·	In the setting of inflammation, active malignancy  ·	Hemoglobin has been stable.  ·	Monitor and maintain HGB > 7    Chest Pain  Dyspnea  ·	Possibly due to abdominal pain which can occur with LUTATHERA  ·	Echocardiogram with no obvious abnormalities. EKG non-ischemic  ·	Diuretics on hold due to kidney injury.    Acute Kidney Injury  ·	In the setting of recent diuresis. Received IV fluids.  ·	Creatinine better    Atrial Fibrillation  ·	Patient is now on apixaban and clopidogrel.  ·	Also on rate control with metoprolol  ·	S/p TRACY/DCCV, maintaining SR. EP following.  ·	Grade 3 and 4 thrombocytopenia is rare with Lutathera (1%), and have been normal on 10/3 and 10/20. May continue eliquis.  ·	There is 5% risk of Afib with Luthatera, spoke to daughter about this and further rechallenge TBD at [facility],    Will continue to follow.    Hugo Topete PA-C  Hematology/Oncology  [facility]  [phone] (office)

## 2023-11-01 NOTE — PHYSICAL THERAPY INITIAL EVALUATION ADULT - ADDITIONAL COMMENTS
pt lives in a private home with spouse. pt lives in a private home with spouse. pt has no steps to enter and once inside has a chair lift to 2nd level. pt reports independent with all ADLs and ambulates with a straight cane. pt has a walk in shower with shower chair and grab bars. pt still drives.

## 2023-11-01 NOTE — PROGRESS NOTE ADULT - PROBLEM SELECTOR PLAN 1
w/ RVR  started on eliquis 5mg BID  s/p dccv with return of sinus  started on amiodarone load but after discussion with outpatient Cards, plan for Sotalol, starting 11/1  metop 25mg XL daily  EP following

## 2023-11-01 NOTE — PROGRESS NOTE ADULT - SUBJECTIVE AND OBJECTIVE BOX
[facility] Division of Hospital Medicine  Armando Ann  MS Teams      SUBJECTIVE / OVERNIGHT EVENTS:  s/p DCCV yesterday  Denies cp/palpitations  Tele reviewed: SR, brief episode PAT, and 1 episode NSVT    ADDITIONAL REVIEW OF SYSTEMS:    MEDICATIONS  (STANDING):  allopurinol 200 milliGRAM(s) Oral daily  apixaban 5 milliGRAM(s) Oral two times a day  atorvastatin 80 milliGRAM(s) Oral at bedtime  chlorhexidine 2% Cloths 1 Application(s) Topical daily  clopidogrel Tablet 75 milliGRAM(s) Oral daily  dextrose 5%. 1000 milliLiter(s) (100 mL/Hr) IV Continuous <Continuous>  dextrose 5%. 1000 milliLiter(s) (50 mL/Hr) IV Continuous <Continuous>  dextrose 50% Injectable 25 Gram(s) IV Push once  dextrose 50% Injectable 12.5 Gram(s) IV Push once  dextrose 50% Injectable 25 Gram(s) IV Push once  glucagon  Injectable 1 milliGRAM(s) IntraMuscular once  insulin lispro (ADMELOG) corrective regimen sliding scale   SubCutaneous three times a day before meals  insulin lispro (ADMELOG) corrective regimen sliding scale   SubCutaneous at bedtime  pantoprazole    Tablet 40 milliGRAM(s) Oral before breakfast  polyethylene glycol 3350 17 Gram(s) Oral daily  senna 2 Tablet(s) Oral at bedtime  sotalol 80 milliGRAM(s) Oral <User Schedule>    MEDICATIONS  (PRN):  acetaminophen     Tablet .. 975 milliGRAM(s) Oral every 6 hours PRN Temp greater or equal to 38C (100.4F), Moderate Pain (4 - 6)  dextrose Oral Gel 15 Gram(s) Oral once PRN Blood Glucose LESS THAN 70 milliGRAM(s)/deciliter      I&O's Summary    31 Oct 2023 07:01  -  01 Nov 2023 07:00  --------------------------------------------------------  IN: 240 mL / OUT: 1000 mL / NET: -760 mL        PHYSICAL EXAM:  Vital Signs Last 24 Hrs  T(C): 36.6 (01 Nov 2023 08:44), Max: 37.2 (01 Nov 2023 04:37)  T(F): 97.9 (01 Nov 2023 08:44), Max: 98.9 (01 Nov 2023 04:37)  HR: 85 (01 Nov 2023 08:44) (80 - 110)  BP: 108/60 (01 Nov 2023 08:44) (108/60 - 118/68)  BP(mean): --  RR: 18 (01 Nov 2023 08:44) (16 - 18)  SpO2: 100% (01 Nov 2023 08:44) (96% - 100%)    Parameters below as of 01 Nov 2023 08:44  Patient On (Oxygen Delivery Method): room air      CONSTITUTIONAL: NAD, well-developed, well-groomed  EYES: PERRLA; conjunctiva and sclera clear  NECK: Supple, no palpable masses  RESPIRATORY: Normal respiratory effort; lungs are clear to auscultation bilaterally  CARDIOVASCULAR: rrr, normal S1 and S2, no murmur: trace LE edema; Peripheral pulses are 2+ bilaterally  ABDOMEN: Nontender to palpation, normoactive bowel sounds, no rebound/guarding  MUSCULOSKELETAL:  no clubbing or cyanosis of digits; no joint swelling or tenderness to palpation  PSYCH: A+O to person, place, and time; affect appropriate  NEUROLOGY: CN 2-12 are intact and symmetric; no gross sensory deficits   SKIN: No rashes; no palpable lesions    LABS:                        11.1   10.60 )-----------( 417      ( 31 Oct 2023 07:08 )             34.7     11-01    139  |  106  |  41<H>  ----------------------------<  120<H>  4.1   |  18<L>  |  1.03    Ca    9.1      01 Nov 2023 07:16  Phos  3.1     11-01  Mg     1.9     11-01    TPro  6.1  /  Alb  2.6<L>  /  TBili  0.5  /  DBili  x   /  AST  131<H>  /  ALT  115<H>  /  AlkPhos  236<H>  11-01          Urinalysis Basic - ( 01 Nov 2023 07:16 )    Color: x / Appearance: x / SG: x / pH: x  Gluc: 120 mg/dL / Ketone: x  / Bili: x / Urobili: x   Blood: x / Protein: x / Nitrite: x   Leuk Esterase: x / RBC: x / WBC x   Sq Epi: x / Non Sq Epi: x / Bacteria: x

## 2023-11-01 NOTE — PROGRESS NOTE ADULT - SUBJECTIVE AND OBJECTIVE BOX
Patient is a 78y old  Male who presents with a chief complaint of Chest pain (01 Nov 2023 09:14)    Patient seen and examined at bedside, feeling well.    MEDICATIONS  (STANDING):  allopurinol 200 milliGRAM(s) Oral daily  apixaban 5 milliGRAM(s) Oral two times a day  atorvastatin 80 milliGRAM(s) Oral at bedtime  chlorhexidine 2% Cloths 1 Application(s) Topical daily  clopidogrel Tablet 75 milliGRAM(s) Oral daily  dextrose 5%. 1000 milliLiter(s) (50 mL/Hr) IV Continuous <Continuous>  dextrose 5%. 1000 milliLiter(s) (100 mL/Hr) IV Continuous <Continuous>  dextrose 50% Injectable 12.5 Gram(s) IV Push once  dextrose 50% Injectable 25 Gram(s) IV Push once  dextrose 50% Injectable 25 Gram(s) IV Push once  glucagon  Injectable 1 milliGRAM(s) IntraMuscular once  insulin lispro (ADMELOG) corrective regimen sliding scale   SubCutaneous at bedtime  insulin lispro (ADMELOG) corrective regimen sliding scale   SubCutaneous three times a day before meals  metoprolol tartrate 25 milliGRAM(s) Oral three times a day  pantoprazole    Tablet 40 milliGRAM(s) Oral before breakfast  polyethylene glycol 3350 17 Gram(s) Oral daily  senna 2 Tablet(s) Oral at bedtime    MEDICATIONS  (PRN):  acetaminophen     Tablet .. 975 milliGRAM(s) Oral every 6 hours PRN Temp greater or equal to 38C (100.4F), Moderate Pain (4 - 6)  dextrose Oral Gel 15 Gram(s) Oral once PRN Blood Glucose LESS THAN 70 milliGRAM(s)/deciliter      Vital Signs Last 24 Hrs  T(C): 36.6 (01 Nov 2023 08:44), Max: 37.2 (01 Nov 2023 04:37)  T(F): 97.9 (01 Nov 2023 08:44), Max: 98.9 (01 Nov 2023 04:37)  HR: 85 (01 Nov 2023 08:44) (80 - 110)  BP: 108/60 (01 Nov 2023 08:44) (108/60 - 123/63)  BP(mean): --  RR: 18 (01 Nov 2023 08:44) (16 - 18)  SpO2: 100% (01 Nov 2023 08:44) (96% - 100%)    Parameters below as of 01 Nov 2023 08:44  Patient On (Oxygen Delivery Method): room air        PE  NAD  Awake, alert  Anicteric, MMM  No c/c/e  No rash grossly  FROM                          11.1   10.60 )-----------( 417      ( 31 Oct 2023 07:08 )             34.7       11-01    139  |  106  |  41<H>  ----------------------------<  120<H>  4.1   |  18<L>  |  1.03    Ca    9.1      01 Nov 2023 07:16  Phos  3.1     11-01  Mg     1.9     11-01    TPro  6.1  /  Alb  2.6<L>  /  TBili  0.5  /  DBili  x   /  AST  131<H>  /  ALT  115<H>  /  AlkPhos  236<H>  11-01

## 2023-11-01 NOTE — PHYSICAL THERAPY INITIAL EVALUATION ADULT - PERTINENT HX OF CURRENT PROBLEM, REHAB EVAL
pt is a 78 M with hx of CAD s/p NURIA to distal LAD in 6/2023, HTN, HLD, EtOH disorder, T2DM, spinal surgeries, neuroendocrine tumor on Lutathera (first treatment on Friday 10/20 at [facility]), presenting with fatigue and chest pain since Friday. Patient reports severe fatigue starting Friday after his first treatment with Lutathera. Pt also reported mid-left lower chest pain described as pressure. No radiation, no aggravating or alleviating factors. Pt has never felt this type of chest pain before.  CXR: Clear lungs. No pneumoperitoneum.  CT Angio Chest: No pulmonary embolism. Evaluation of the segmental/subsegmental arteries is limited due to suboptimal arterial opacification and respiratory   motion artifact.  VA Duplex: No obvious deep vein thrombosis in either lower extremity.

## 2023-11-02 LAB
ANION GAP SERPL CALC-SCNC: 10 MMOL/L — SIGNIFICANT CHANGE UP (ref 5–17)
ANION GAP SERPL CALC-SCNC: 10 MMOL/L — SIGNIFICANT CHANGE UP (ref 5–17)
BUN SERPL-MCNC: 35 MG/DL — HIGH (ref 7–23)
BUN SERPL-MCNC: 35 MG/DL — HIGH (ref 7–23)
CALCIUM SERPL-MCNC: 9.1 MG/DL — SIGNIFICANT CHANGE UP (ref 8.4–10.5)
CALCIUM SERPL-MCNC: 9.1 MG/DL — SIGNIFICANT CHANGE UP (ref 8.4–10.5)
CHLORIDE SERPL-SCNC: 109 MMOL/L — HIGH (ref 96–108)
CHLORIDE SERPL-SCNC: 109 MMOL/L — HIGH (ref 96–108)
CO2 SERPL-SCNC: 21 MMOL/L — LOW (ref 22–31)
CO2 SERPL-SCNC: 21 MMOL/L — LOW (ref 22–31)
CREAT SERPL-MCNC: 1.05 MG/DL — SIGNIFICANT CHANGE UP (ref 0.5–1.3)
CREAT SERPL-MCNC: 1.05 MG/DL — SIGNIFICANT CHANGE UP (ref 0.5–1.3)
EGFR: 73 ML/MIN/1.73M2 — SIGNIFICANT CHANGE UP
EGFR: 73 ML/MIN/1.73M2 — SIGNIFICANT CHANGE UP
GLUCOSE BLDC GLUCOMTR-MCNC: 111 MG/DL — HIGH (ref 70–99)
GLUCOSE BLDC GLUCOMTR-MCNC: 111 MG/DL — HIGH (ref 70–99)
GLUCOSE BLDC GLUCOMTR-MCNC: 123 MG/DL — HIGH (ref 70–99)
GLUCOSE BLDC GLUCOMTR-MCNC: 123 MG/DL — HIGH (ref 70–99)
GLUCOSE BLDC GLUCOMTR-MCNC: 136 MG/DL — HIGH (ref 70–99)
GLUCOSE BLDC GLUCOMTR-MCNC: 136 MG/DL — HIGH (ref 70–99)
GLUCOSE BLDC GLUCOMTR-MCNC: 189 MG/DL — HIGH (ref 70–99)
GLUCOSE BLDC GLUCOMTR-MCNC: 189 MG/DL — HIGH (ref 70–99)
GLUCOSE SERPL-MCNC: 117 MG/DL — HIGH (ref 70–99)
GLUCOSE SERPL-MCNC: 117 MG/DL — HIGH (ref 70–99)
HCT VFR BLD CALC: 31 % — LOW (ref 39–50)
HCT VFR BLD CALC: 31 % — LOW (ref 39–50)
HGB BLD-MCNC: 9.8 G/DL — LOW (ref 13–17)
HGB BLD-MCNC: 9.8 G/DL — LOW (ref 13–17)
MCHC RBC-ENTMCNC: 29.3 PG — SIGNIFICANT CHANGE UP (ref 27–34)
MCHC RBC-ENTMCNC: 29.3 PG — SIGNIFICANT CHANGE UP (ref 27–34)
MCHC RBC-ENTMCNC: 31.6 GM/DL — LOW (ref 32–36)
MCHC RBC-ENTMCNC: 31.6 GM/DL — LOW (ref 32–36)
MCV RBC AUTO: 92.5 FL — SIGNIFICANT CHANGE UP (ref 80–100)
MCV RBC AUTO: 92.5 FL — SIGNIFICANT CHANGE UP (ref 80–100)
NRBC # BLD: 0 /100 WBCS — SIGNIFICANT CHANGE UP (ref 0–0)
NRBC # BLD: 0 /100 WBCS — SIGNIFICANT CHANGE UP (ref 0–0)
PLATELET # BLD AUTO: 416 K/UL — HIGH (ref 150–400)
PLATELET # BLD AUTO: 416 K/UL — HIGH (ref 150–400)
POTASSIUM SERPL-MCNC: 4.6 MMOL/L — SIGNIFICANT CHANGE UP (ref 3.5–5.3)
POTASSIUM SERPL-MCNC: 4.6 MMOL/L — SIGNIFICANT CHANGE UP (ref 3.5–5.3)
POTASSIUM SERPL-SCNC: 4.6 MMOL/L — SIGNIFICANT CHANGE UP (ref 3.5–5.3)
POTASSIUM SERPL-SCNC: 4.6 MMOL/L — SIGNIFICANT CHANGE UP (ref 3.5–5.3)
RBC # BLD: 3.35 M/UL — LOW (ref 4.2–5.8)
RBC # BLD: 3.35 M/UL — LOW (ref 4.2–5.8)
RBC # FLD: 14.9 % — HIGH (ref 10.3–14.5)
RBC # FLD: 14.9 % — HIGH (ref 10.3–14.5)
SODIUM SERPL-SCNC: 140 MMOL/L — SIGNIFICANT CHANGE UP (ref 135–145)
SODIUM SERPL-SCNC: 140 MMOL/L — SIGNIFICANT CHANGE UP (ref 135–145)
WBC # BLD: 9.21 K/UL — SIGNIFICANT CHANGE UP (ref 3.8–10.5)
WBC # BLD: 9.21 K/UL — SIGNIFICANT CHANGE UP (ref 3.8–10.5)
WBC # FLD AUTO: 9.21 K/UL — SIGNIFICANT CHANGE UP (ref 3.8–10.5)
WBC # FLD AUTO: 9.21 K/UL — SIGNIFICANT CHANGE UP (ref 3.8–10.5)

## 2023-11-02 PROCEDURE — 93010 ELECTROCARDIOGRAM REPORT: CPT

## 2023-11-02 PROCEDURE — 99232 SBSQ HOSP IP/OBS MODERATE 35: CPT

## 2023-11-02 PROCEDURE — 99232 SBSQ HOSP IP/OBS MODERATE 35: CPT | Mod: FS

## 2023-11-02 RX ORDER — HYDRALAZINE HCL 50 MG
10 TABLET ORAL THREE TIMES A DAY
Refills: 0 | Status: DISCONTINUED | OUTPATIENT
Start: 2023-11-02 | End: 2023-11-04

## 2023-11-02 RX ADMIN — Medication 200 MILLIGRAM(S): at 16:18

## 2023-11-02 RX ADMIN — Medication 25 MILLIGRAM(S): at 05:38

## 2023-11-02 RX ADMIN — Medication 80 MILLIGRAM(S): at 03:10

## 2023-11-02 RX ADMIN — SENNA PLUS 2 TABLET(S): 8.6 TABLET ORAL at 22:19

## 2023-11-02 RX ADMIN — CHLORHEXIDINE GLUCONATE 1 APPLICATION(S): 213 SOLUTION TOPICAL at 10:00

## 2023-11-02 RX ADMIN — ATORVASTATIN CALCIUM 80 MILLIGRAM(S): 80 TABLET, FILM COATED ORAL at 22:18

## 2023-11-02 RX ADMIN — Medication 80 MILLIGRAM(S): at 16:18

## 2023-11-02 RX ADMIN — APIXABAN 5 MILLIGRAM(S): 2.5 TABLET, FILM COATED ORAL at 17:27

## 2023-11-02 RX ADMIN — Medication 10 MILLIGRAM(S): at 22:19

## 2023-11-02 RX ADMIN — APIXABAN 5 MILLIGRAM(S): 2.5 TABLET, FILM COATED ORAL at 05:37

## 2023-11-02 RX ADMIN — Medication 1: at 17:28

## 2023-11-02 RX ADMIN — CLOPIDOGREL BISULFATE 75 MILLIGRAM(S): 75 TABLET, FILM COATED ORAL at 16:18

## 2023-11-02 RX ADMIN — PANTOPRAZOLE SODIUM 40 MILLIGRAM(S): 20 TABLET, DELAYED RELEASE ORAL at 05:37

## 2023-11-02 NOTE — DIETITIAN INITIAL EVALUATION ADULT - ETIOLOGY
PO intake not meeting EER vs. acuteness of illness and ? progression of disease increased demand for nutrients

## 2023-11-02 NOTE — PROGRESS NOTE ADULT - PROBLEM SELECTOR PLAN 1
w/ RVR  started on eliquis 5mg BID  s/p dccv with return of sinus  started on amiodarone load but after discussion with outpatient Cards, plan for Sotalol, initiating 11/1-11/3 per protocol  metop 25mg XL daily  EP following

## 2023-11-02 NOTE — PROGRESS NOTE ADULT - ASSESSMENT
NANDO HERNANDEZ is a 78y Male who presents with a chief complaint of chest pain.    Metastatic Pancreatic Neuroendocrine Tumor  ·	Patient follows with Dr. Sophia Prasad, [facility].  ·	Recent progression of disease; last received Lutetium Edith-177 LUTATHERA on October 20th.  ·	No systemic therapy while inpatient or during rehabilitation    Anemia  ·	In the setting of inflammation, active malignancy  ·	Hemoglobin has been stable.  ·	Monitor and maintain HGB > 7    Chest Pain  Dyspnea  ·	Possibly due to abdominal pain which can occur with LUTATHERA  ·	Echocardiogram with no obvious abnormalities. EKG non-ischemic  ·	Diuretics on hold due to kidney injury.    Acute Kidney Injury  ·	In the setting of recent diuresis. Received IV fluids.  ·	Creatinine normalized    Atrial Fibrillation  ·	Patient is now on apixaban and clopidogrel.  ·	Also on rate control with metoprolol  ·	S/p TRACY/DCCV, maintaining SR. EP following.  ·	Grade 3 and 4 thrombocytopenia is rare with Lutathera (1%), and have been normal on 10/3 and 10/20. May continue eliquis.  ·	There is 5% risk of Afib with Luthatera, spoke to daughter about this and further [place] [facility]    Will continue to follow.    Christen Rosales NP  Hematology/ Oncology  [facility]  [phone] (office)  [phone] (alt office)  Evenings and weekends please call MD on call or office

## 2023-11-02 NOTE — DIETITIAN INITIAL EVALUATION ADULT - PERTINENT LABORATORY DATA
11-02    140  |  109<H>  |  35<H>  ----------------------------<  117<H>  4.6   |  21<L>  |  1.05    Ca    9.1      02 Nov 2023 06:24  Phos  3.1     11-01  Mg     1.9     11-01    TPro  6.1  /  Alb  2.6<L>  /  TBili  0.5  /  DBili  x   /  AST  131<H>  /  ALT  115<H>  /  AlkPhos  236<H>  11-01  POCT Blood Glucose.: 111 mg/dL (11-02-23 @ 08:02)  A1C with Estimated Average Glucose Result: 6.7 % (10-25-23 @ 06:57)   11-02    140  |  109<H>  |  35<H>  ----------------------------<  117<H>  4.6   |  21<L>  |  1.05    Ca    9.1      02 Nov 2023 06:24  Phos  3.1     11-01  Mg     1.9     11-01    TPro  6.1  /  Alb  2.6<L>  /  TBili  0.5  /  DBili  x   /  AST  131<H>  /  ALT  115<H>  /  AlkPhos  236<H>  11-01  POCT Blood Glucose.: 111 mg/dL (11-02-23 @ 08:02)  A1C with Estimated Average Glucose Result: 6.7 % (10-25-23 @ 06:57)

## 2023-11-02 NOTE — ADVANCED PRACTICE NURSE CONSULT - RECOMMEDATIONS
Recommendations  1) Sacral- cleanse w/ No Rinse Cleanser, apply Nara Moisture Barrier Cream 2x/d to seal & protect skin. If patient does not want to use cream may use No Sting barrier Film e.g. cavilon daily instead.  2) Waffle seat cushion to off load & limit sitting to 2 hour intervals.  3) Sween 24 to dry intact skin 2x/d to hydrate.  4) Continue to turn & position, monitor skin.  5) For red area on back- No sting barrier film e.g. Cavilon daily to seal & protect.   Discussed w/ Staff TAVO Wharton @ bedside. Discussed plan of care w/ pt including off loading, preventative measures, etc. Pt w/ good teach back.  Remain available as requested by staff Recommendations  1) Sacral- cleanse w/ No Rinse Cleanser, apply Nara Moisture Barrier Cream 2x/d to seal & protect skin. If patient does not want to use cream may use No Sting barrier Film e.g. cavilon daily instead.  2) Waffle seat cushion to off load & limit sitting to 2 hour intervals.  3) Sween 24 to dry intact skin 2x/d to hydrate.  4) Continue to turn & position, monitor skin.  5) For red area on back- No sting barrier film e.g. Cavilon daily to seal & protect.   6) Nutrition consult.  Discussed w/ Staff TAVO Wharton @ bedside. Discussed plan of care w/ pt including off loading, preventative measures, etc. Pt w/ good teach back.  Remain available as requested by staff

## 2023-11-02 NOTE — DIETITIAN INITIAL EVALUATION ADULT - PHYSCIAL ASSESSMENT
Pt reports  pounds PTA and stable. reports weight loss since admission.  weight today 254.4 pounds  This indicates a 17.6 pound / 6.5% weight loss since admission.   RD will continue to monitor trends. Edema noted below may be skewing further loss.

## 2023-11-02 NOTE — PROGRESS NOTE ADULT - SUBJECTIVE AND OBJECTIVE BOX
DATE OF SERVICE: 11-02-23 @ 15:12    Patient is a 78y old  Male who presents with a chief complaint of Chest pain (02 Nov 2023 12:47)      INTERVAL HISTORY:     REVIEW OF SYSTEMS:  CONSTITUTIONAL: No weakness  EYES/ENT: No visual changes;  No throat pain   NECK: No pain or stiffness  RESPIRATORY: No cough, wheezing; No shortness of breath  CARDIOVASCULAR: No chest pain or palpitations  GASTROINTESTINAL: No abdominal  pain. No nausea, vomiting, or hematemesis  GENITOURINARY: No dysuria, frequency or hematuria  NEUROLOGICAL: No stroke like symptoms  SKIN: No rashes    TELEMETRY Personally reviewed:  	  MEDICATIONS:  metoprolol succinate ER 25 milliGRAM(s) Oral daily  sotalol 80 milliGRAM(s) Oral <User Schedule>        PHYSICAL EXAM:  T(C): 36.5 (11-02-23 @ 11:19), Max: 36.6 (11-01-23 @ 19:19)  HR: 66 (11-02-23 @ 11:19) (66 - 81)  BP: 117/65 (11-02-23 @ 11:19) (103/65 - 119/68)  RR: 18 (11-02-23 @ 11:19) (18 - 18)  SpO2: 98% (11-02-23 @ 11:19) (96% - 98%)  Wt(kg): --  I&O's Summary    01 Nov 2023 07:01  -  02 Nov 2023 07:00  --------------------------------------------------------  IN: 650 mL / OUT: 1200 mL / NET: -550 mL    02 Nov 2023 07:01  -  02 Nov 2023 15:12  --------------------------------------------------------  IN: 420 mL / OUT: 100 mL / NET: 320 mL          Appearance: In no distress	  HEENT:    PERRL, EOMI	  Cardiovascular:  S1 S2, No JVD  Respiratory: Lungs clear to auscultation	  Gastrointestinal:  Soft, Non-tender, + BS	  Vascularature:  No edema of LE  Psychiatric: Appropriate affect   Neuro: no acute focal deficits                               9.8    9.21  )-----------( 416      ( 02 Nov 2023 06:24 )             31.0     11-02    140  |  109<H>  |  35<H>  ----------------------------<  117<H>  4.6   |  21<L>  |  1.05    Ca    9.1      02 Nov 2023 06:24  Phos  3.1     11-01  Mg     1.9     11-01    TPro  6.1  /  Alb  2.6<L>  /  TBili  0.5  /  DBili  x   /  AST  131<H>  /  ALT  115<H>  /  AlkPhos  236<H>  11-01        Labs personally reviewed      ASSESSMENT/PLAN: 	  78 M with hx of CAD s/p NURIA to distal LAD in 6/2023, HTN, HLD, EtOH disorder, T2DM, spinal surgeries, neuroendocrine tumor on Lutathera (first treatment on Friday 10/20 at [facility]), presenting with fatigue and chest pain since Friday. Patient reports severe fatigue starting Friday after his first treatment with Lutathera. Pt also reported mid-left lower chest pain described as pressure. No radiation, no aggravating or alleviating factors. Pt has never felt this type of chest pain before. Denies palpitations, diaphoresis, dyspnea, nausea, vomiting. Pt has diffuse abdominal pain. Also with constipation; last BM several days ago. No other acute complaints.      Problem/Plan - 1:  ·  Problem: Chest Pain improving   ·  Plan: Location described as LUQ abdomen with pain elicited with palpation, appears noncardiac   - EKG SR with PACs, no ischemic characteristics  - Trop 72-->83--> 81/ CKMB 1.9  - 10/26 reports CP, trop increased to 110 --> 133 likely 2/2 demand i/s/o ST and fever. Trend to peak.   - Mildly elevated LFTS and Lipase noted  - Hx of recent PCI  - Repeat TTE unchanged   - CP possibly 2/2 lutathera with known S/E of abdominal discomfort     Problem/Plan - 2:  ·  Problem: Shortness of Breath  ·  Plan: ECG non-ischemic  - TTE wnl.  - BNP 2572. Repeat 4171.  - CT neg for PE, no pulm edema noted  - s/p Lasix 20 IVP once. Will hold further diuretics given RAZIA.   - Cr now wnl. Will slowly re-implement home diuretics.      Problem/Plan - 3:  ·  Problem: CAD.   ·  Plan: Recent PCI to pLAD in June 2023  - ECG non-ischemic  - Trop/CKMB as noted above  - c/w Plavix and statin, d/c ASA given starting Eliquis  - TTE unchanged     Problem/Plan - 4:  ·  Problem: RAZIA - resolved Cr wnl   ·  Plan: c/w hydration and monitor kidney functions      Problem/Plan - 5:  ·  Problem: New Onset Atrial Fibrillation  - As per tele, converted to rapid AF rates 120-140bpm  - TSH wnl  - s/p TRACY without thrombus, s/p DCCV with return of sinus  - [id number] of 2. Cont Eliquis 5mg BID  - Cont Toprol 25mg PO daily  - C/w of Sotalol as per EP--> monitor qTC             ESTEFANI Velez DO [facility]  Cardiovascular Medicine  [address] [place] [address], [address]  Available through call or text on [employer] TEAM[employer]  Office: [phone]   DATE OF SERVICE: 11-02-23 @ 15:12    Patient is a 78y old  Male who presents with a chief complaint of Chest pain (02 Nov 2023 12:47)      INTERVAL HISTORY: feels ok    REVIEW OF SYSTEMS:  CONSTITUTIONAL: No weakness  EYES/ENT: No visual changes;  No throat pain   NECK: No pain or stiffness  RESPIRATORY: No cough, wheezing; No shortness of breath  CARDIOVASCULAR: No chest pain or palpitations  GASTROINTESTINAL: No abdominal  pain. No nausea, vomiting, or hematemesis  GENITOURINARY: No dysuria, frequency or hematuria  NEUROLOGICAL: No stroke like symptoms  SKIN: No rashes    TELEMETRY Personally reviewed:  	  MEDICATIONS:  metoprolol succinate ER 25 milliGRAM(s) Oral daily  sotalol 80 milliGRAM(s) Oral <User Schedule>        PHYSICAL EXAM:  T(C): 36.5 (11-02-23 @ 11:19), Max: 36.6 (11-01-23 @ 19:19)  HR: 66 (11-02-23 @ 11:19) (66 - 81)  BP: 117/65 (11-02-23 @ 11:19) (103/65 - 119/68)  RR: 18 (11-02-23 @ 11:19) (18 - 18)  SpO2: 98% (11-02-23 @ 11:19) (96% - 98%)  Wt(kg): --  I&O's Summary    01 Nov 2023 07:01  -  02 Nov 2023 07:00  --------------------------------------------------------  IN: 650 mL / OUT: 1200 mL / NET: -550 mL    02 Nov 2023 07:01  -  02 Nov 2023 15:12  --------------------------------------------------------  IN: 420 mL / OUT: 100 mL / NET: 320 mL          Appearance: In no distress	  HEENT:    PERRL, EOMI	  Cardiovascular:  S1 S2, No JVD  Respiratory: Lungs clear to auscultation	  Gastrointestinal:  Soft, Non-tender, + BS	  Vascularature:  No edema of LE  Psychiatric: Appropriate affect   Neuro: no acute focal deficits                               9.8    9.21  )-----------( 416      ( 02 Nov 2023 06:24 )             31.0     11-02    140  |  109<H>  |  35<H>  ----------------------------<  117<H>  4.6   |  21<L>  |  1.05    Ca    9.1      02 Nov 2023 06:24  Phos  3.1     11-01  Mg     1.9     11-01    TPro  6.1  /  Alb  2.6<L>  /  TBili  0.5  /  DBili  x   /  AST  131<H>  /  ALT  115<H>  /  AlkPhos  236<H>  11-01        Labs personally reviewed      ASSESSMENT/PLAN: 	  78 M with hx of CAD s/p NURIA to distal LAD in 6/2023, HTN, HLD, EtOH disorder, T2DM, spinal surgeries, neuroendocrine tumor on Lutathera (first treatment on Friday 10/20 at [facility] [place] presenting with fatigue and chest pain since Friday. Patient reports severe fatigue starting Friday after his first treatment with Lutathera. Pt also reported mid-left lower chest pain described as pressure. No radiation, no aggravating or alleviating factors. Pt has never felt this type of chest pain before. Denies palpitations, diaphoresis, dyspnea, nausea, vomiting. Pt has diffuse abdominal pain. Also with constipation; last BM several days ago. No other acute complaints.      Problem/Plan - 1:  ·  Problem: Chest Pain improving   ·  Plan: Location described as LUQ abdomen with pain elicited with palpation, appears noncardiac   - EKG SR with PACs, no ischemic characteristics  - Trop 72-->83--> 81/ CKMB 1.9  - 10/26 reports CP, trop increased to 110 --> 133 likely 2/2 demand i/s/o ST and fever. Trend to peak.   - Mildly elevated LFTS and Lipase noted  - Hx of recent PCI  - Repeat TTE unchanged   - CP possibly 2/2 lutathera with known S/E of abdominal discomfort     Problem/Plan - 2:  ·  Problem: Shortness of Breath  ·  Plan: ECG non-ischemic  - TTE wnl.  - BNP 2572. Repeat 4171.  - CT neg for PE, no pulm edema noted  - s/p Lasix 20 IVP once. Will hold further diuretics given RAZIA.   - Cr now wnl. Will slowly re-implement home diuretics.      Problem/Plan - 3:  ·  Problem: CAD.   ·  Plan: Recent PCI to pLAD in June 2023  - ECG non-ischemic  - Trop/CKMB as noted above  - c/w Plavix and statin, d/c ASA given starting Eliquis  - TTE unchanged     Problem/Plan - 4:  ·  Problem: RAZIA - resolved Cr wnl   ·  Plan: c/w hydration and monitor kidney functions      Problem/Plan - 5:  ·  Problem: New Onset Atrial Fibrillation  - As per tele, converted to rapid AF rates 120-140bpm  - TSH wnl  - s/p TRACY without thrombus, s/p DCCV with return of sinus  - [id number] of 2. Cont Eliquis 5mg BID  - Cont Toprol 25mg PO daily  - C/w of Sotalol as per EP--> monitor qTC             ESTEFANI Velez DO [facility]  Cardiovascular Medicine  [address] [place] [address], [address]  Available through call or text on [employer] TEAMs  Office: [phone]

## 2023-11-02 NOTE — DIETITIAN INITIAL EVALUATION ADULT - PERTINENT MEDS FT
MEDICATIONS  (STANDING):  allopurinol 200 milliGRAM(s) Oral daily  apixaban 5 milliGRAM(s) Oral two times a day  atorvastatin 80 milliGRAM(s) Oral at bedtime  chlorhexidine 2% Cloths 1 Application(s) Topical daily  clopidogrel Tablet 75 milliGRAM(s) Oral daily  dextrose 5%. 1000 milliLiter(s) (50 mL/Hr) IV Continuous <Continuous>  dextrose 5%. 1000 milliLiter(s) (100 mL/Hr) IV Continuous <Continuous>  dextrose 50% Injectable 25 Gram(s) IV Push once  dextrose 50% Injectable 12.5 Gram(s) IV Push once  dextrose 50% Injectable 25 Gram(s) IV Push once  glucagon  Injectable 1 milliGRAM(s) IntraMuscular once  insulin lispro (ADMELOG) corrective regimen sliding scale   SubCutaneous at bedtime  insulin lispro (ADMELOG) corrective regimen sliding scale   SubCutaneous three times a day before meals  metoprolol succinate ER 25 milliGRAM(s) Oral daily  pantoprazole    Tablet 40 milliGRAM(s) Oral before breakfast  polyethylene glycol 3350 17 Gram(s) Oral daily  senna 2 Tablet(s) Oral at bedtime  sotalol 80 milliGRAM(s) Oral <User Schedule>    MEDICATIONS  (PRN):  acetaminophen     Tablet .. 975 milliGRAM(s) Oral every 6 hours PRN Temp greater or equal to 38C (100.4F), Moderate Pain (4 - 6)  dextrose Oral Gel 15 Gram(s) Oral once PRN Blood Glucose LESS THAN 70 milliGRAM(s)/deciliter

## 2023-11-02 NOTE — DIETITIAN INITIAL EVALUATION ADULT - ADD RECOMMEND
1) Will continue to monitor PO intake, weight, labs, skin, GI status and diet 2) nutrition risk placed in chart

## 2023-11-02 NOTE — DIETITIAN INITIAL EVALUATION ADULT - REASON FOR ADMISSION
78 M with hx of CAD s/p NURIA to distal LAD in 6/2023, HTN, HLD, EtOH disorder, T2DM, spinal surgeries, neuroendocrine [place]utathe[place] [facility] [place] with fatigue and chest pain, fever + SIRS criteria. Concern for reaction to Lutathera vs progression of disease given start of symptoms. C/B afib with RVR, s/p DCCV. Trialing sotalol.

## 2023-11-02 NOTE — PROGRESS NOTE ADULT - ASSESSMENT
78 year old Male with hx of CAD s/p NURIA to distal LAD in 6/2023, HTN, HLD, prior EtOH disorder (denies current use), T2DM, Scoliosis, spinal surgeries, Metastatic Pancreatic neuroendocrine tumor on Lutathera (first treatment on 10/20/23 at [facility] [place] presenting with fatigue and chest pain. EP consulted for new onset AFib started on 10/26/23 in-patient with RVR. Now s/p successful TRACY/DCCV on 10/31/23 to restore sinus rhythm. The patient's LFTs were reportedly normal in September. He has had mild transaminitis this admission. Therefore AAD changed from amiodarone to sotalol given mild transaminitis.     1. New Onset AFib with RVR  2. Metastatic Pancreatic neuroendocrine tumor  3. Mild transaminitis     - Maintaining SR post DCCV   - TSH WNL 3.74  - Continue Eliquis 5mg PO BID without interruption  - Discussed with Heme/ Onc team, no prior thrombocytopenia on Lutathera    - There is no interaction between sotalol and Lutathera  - Continue metoprolol succinate 25mg QD  - Continue sotalol 80mg Q12hr, needs QTc monitoring for every dose for the first 5 doses (QTc stable at 440ms after the 2nd dose)   - Supplement to ensure K>4.0, Mg >2.0  - Continue tele monitor   - EP will continue to follow     ALISTAIR Holloway NP-C  [phone]

## 2023-11-02 NOTE — DIETITIAN INITIAL EVALUATION ADULT - ORAL INTAKE PTA/DIET HISTORY
visited pt at bedside this morning. reports good PO intake PTA, no decreases in appetite since chemo started. PMHx of DM2 noted. Current Hgb A1c 6.7%. metformin noted in outpatient medications. reports blood glucose usually controlled under 150mg/dL PTA.

## 2023-11-02 NOTE — DIETITIAN INITIAL EVALUATION ADULT - REASON INDICATOR FOR ASSESSMENT
seen for length of stay. information obtained from pt, electronic medical record, team during interdisciplinary rounds

## 2023-11-02 NOTE — PROGRESS NOTE ADULT - SUBJECTIVE AND OBJECTIVE BOX
24H hour events: Pt with complaint, no acute events overnight, Tele: SR with HR at 60-70's, occasional APCs, atrial bigeminy, and VPCs,     MEDICATIONS:  apixaban 5 milliGRAM(s) Oral two times a day  clopidogrel Tablet 75 milliGRAM(s) Oral daily  metoprolol succinate ER 25 milliGRAM(s) Oral daily  sotalol 80 milliGRAM(s) Oral <User Schedule>  acetaminophen     Tablet .. 975 milliGRAM(s) Oral every 6 hours PRN  pantoprazole    Tablet 40 milliGRAM(s) Oral before breakfast  polyethylene glycol 3350 17 Gram(s) Oral daily  senna 2 Tablet(s) Oral at bedtime  allopurinol 200 milliGRAM(s) Oral daily  atorvastatin 80 milliGRAM(s) Oral at bedtime  dextrose 50% Injectable 25 Gram(s) IV Push once  dextrose 50% Injectable 25 Gram(s) IV Push once  dextrose 50% Injectable 12.5 Gram(s) IV Push once  dextrose Oral Gel 15 Gram(s) Oral once PRN  glucagon  Injectable 1 milliGRAM(s) IntraMuscular once  insulin lispro (ADMELOG) corrective regimen sliding scale   SubCutaneous three times a day before meals  insulin lispro (ADMELOG) corrective regimen sliding scale   SubCutaneous at bedtime  chlorhexidine 2% Cloths 1 Application(s) Topical daily  dextrose 5%. 1000 milliLiter(s) IV Continuous <Continuous>  dextrose 5%. 1000 milliLiter(s) IV Continuous <Continuous>      REVIEW OF SYSTEMS:  Complete 12 point ROS negative.    PHYSICAL EXAM:  T(C): 36.6 (11-02-23 @ 04:32), Max: 36.6 (11-01-23 @ 19:19)  HR: 68 (11-02-23 @ 04:32) (68 - 82)  BP: 103/65 (11-02-23 @ 04:32) (103/65 - 156/81)  RR: 18 (11-02-23 @ 04:32) (18 - 18)  SpO2: 96% (11-02-23 @ 04:32) (96% - 98%)  Wt(kg): --  I&O's Summary    01 Nov 2023 07:01  -  02 Nov 2023 07:00  --------------------------------------------------------  IN: 650 mL / OUT: 1200 mL / NET: -550 mL        Appearance: Normal	  HEENT: PERRL, EOMI	  Cardiovascular: Normal S1 S2, No JVD, No murmurs  Respiratory: Lungs clear to auscultation	  Psychiatry: A & O x 3, Mood & affect appropriate  Gastrointestinal: Soft, Non-tender, + BS	  Skin: No rashes, No ecchymoses, No cyanosis	  Neurologic: Grossly intact  Extremities: No clubbing or cyanosis. B/L LE trace edema  Vascular: Peripheral pulses palpable 2+ bilaterally        LABS:	 	    CBC Full  -  ( 02 Nov 2023 06:24 )  WBC Count : 9.21 K/uL  Hemoglobin : 9.8 g/dL  Hematocrit : 31.0 %  Platelet Count - Automated : 416 K/uL  Mean Cell Volume : 92.5 fl  Mean Cell Hemoglobin : 29.3 pg  Mean Cell Hemoglobin Concentration : 31.6 gm/dL  Auto Neutrophil # : x  Auto Lymphocyte # : x  Auto Monocyte # : x  Auto Eosinophil # : x  Auto Basophil # : x  Auto Neutrophil % : x  Auto Lymphocyte % : x  Auto Monocyte % : x  Auto Eosinophil % : x  Auto Basophil % : x    11-02    140  |  109<H>  |  35<H>  ----------------------------<  117<H>  4.6   |  21<L>  |  1.05  11-01    139  |  106  |  41<H>  ----------------------------<  120<H>  4.1   |  18<L>  |  1.03    Ca    9.1      02 Nov 2023 06:24  Ca    9.1      01 Nov 2023 07:16  Phos  3.1     11-01  Mg     1.9     11-01    TPro  6.1  /  Alb  2.6<L>  /  TBili  0.5  /  DBili  x   /  AST  131<H>  /  ALT  115<H>  /  AlkPhos  236<H>  11-01    Thyroid Stimulating Hormone, Serum in AM (10.31.23 @ 07:06)    Thyroid Stimulating Hormone, Serum: 3.74 uIU/mL        TELEMETRY: SR with HR at 60-70's, occasional APCs, atrial bigeminy, and VPCs  	    ECG (11/2/23 at 5:02am): SR with atrial bigeminy, HR 69 bpm, QT 410ms, QTc 440ms    < from: TRACY W or WO Ultrasound Enhancing Agent (10.31.23 @ 09:34) >  CONCLUSIONS:     1. Left ventricular systolic function is normal with an ejection fraction visually estimated at 60 to 65 %.   2. Normal right ventricular cavity size, wall thickness, and systolic function.   3. No evidence of left atrial or left atrial appendage thrombus. The left atrial appendage emptying velocity is low at 24 cm/s.   4. Minimal (grade 1) spontaneous echo contrast located in the left atrial apendage.   5. Mild mitral regurgitation. The mitral regurgitant jet is centrally directed. Mechanism of mitral regurgitation: The mechanism of mitral regurgitation: Gabbie Type I (normal leaflet motion with dilated annulus). Blunting of the pulmonary venous flow consistent with elevated left atrial pressure.   6. No pericardial effusion seen.   7. Compared to the transthoracic echocardiogram performed on 10/25/2023 there have been no significant interval changes.    ____________________________________________________________________  TRACY Procedure:  After discussion of the risks and benefits ofthe TRACY, an informed consent was obtained. Study was performed with sedation - see anesthesia record. Images were obtained with the patient in a left lateral decubitus position. Image quality was good. The patient's vital signs; including heart rate,blood pressure, and oxygen saturation; remained stable throughout the procedure. The patient tolerated the procedure well and without complications.  ________________________________________________________________________________________  Cardioversion Procedure:  Written, informed consent to proceed with cardioversion was obtained prior to administering conscious sedation. After the transesophageal echocardiogram demonstrated no left atrial appendage thrombus, it was decided to proceed with cardioversion. The patient was preoxygenated with oxygen by face mask. The patient was monitored throughout the procedure by a nurse. The patient was shocked at 200 J on a biphasic defibrillator. The patient was successfully cardioverted to sinus rhythm.Cardiac rhythm was confirmed with a 12-lead electrocardiogram. There were no complications from the cardioversion procedure. The attending physician was present for the entire procedure.  ________________________________________________________________________________________  FINDINGS:     Left Ventricle:  Left ventricular systolic function is normal with an ejection fraction visually estimated at 60 to 65%. Unable to assess left ventricular diastolic function due to insufficient data.     RightVentricle:  The right ventricular cavity is normal in size, normal wall thickness and normal systolic function.     Left Atrium:  There is minimal (grade 1) spontaneous echo contrast located in the left atrial apendage. There is no evidence of left atrial or left atrial appendage thrombus. The left atrial appendage emptying velocity is low at 24 cm/s (normal >40cm/s).     Right Atrium:  There is no evidence of right atrial or right atrial appendage thrombus.     Interatrial Septum:  There is no evidence of a patent foramen ovale by color flow Doppler.     Aortic Valve:  The aortic valve appears trileaflet. There is fibrocalcific aortic valve sclerosis without stenosis. There is mild aortic regurgitation.     Mitral Valve:  Structurally normalmitral valve with normal leaflet excursion. There is mild mitral regurgitation. The mitral regurgitant jet is centrally directed. Mechanism of mitral regurgitation: The mechanism of mitral regurgitation: Gabbie Type I (normal leaflet motion with dilated annulus). There is blunting of the pulmonary venous flow consistent with elevated left atrial pressure.     Tricuspid Valve:  Structurally normal tricuspid valve with normal leaflet excursion. There is moderate tricuspid regurgitation. Mild pulmonary hypertension.     Pulmonic Valve:  Structurally normal pulmonic valve with normal leaflet excursion. There is trace pulmonic regurgitation.     Aorta:  The aortic root at the sinuses of Valsalva is normal in size. There is mild non-mobile atheroma in the visualized portions of the descending aorta.     Pericardium:  No pericardial effusion seen.  ____________________________________________________________________  Quantitative Data:  Left Ventricle Measurements: (Indexed to BSA)     Visualized LV EF%: 60 to 65%    Aorta Measurements: (normal range) (Indexed to BSA)     Sinuses of Valsalva: 3.20 cm (3.1 - 3.7 cm)       Tricuspid Valve Measurements:     TR Vmax:          3.3 m/s  TR Peak Gradient: 43.0 mmHg       --------------------------------------------------------------------------------  TomTec:  LV Analysis:  EF: 62 %    < end of copied text >

## 2023-11-02 NOTE — CHART NOTE - NSCHARTNOTEFT_GEN_A_CORE
EKG checked, QTc 2 hrs  after today's AM second dose of sotalol was 452.        Tammy Stevens NP, #[id number]

## 2023-11-02 NOTE — PROGRESS NOTE ADULT - SUBJECTIVE AND OBJECTIVE BOX
[facility] Division of Hospital Medicine  Armando Seth  MS Teams      SUBJECTIVE / OVERNIGHT EVENTS:  No events overnight  denies cp/sob/palpitations  Tele reviewed: sinus with pvcs and couplets  ADDITIONAL REVIEW OF SYSTEMS:    MEDICATIONS  (STANDING):  allopurinol 200 milliGRAM(s) Oral daily  apixaban 5 milliGRAM(s) Oral two times a day  atorvastatin 80 milliGRAM(s) Oral at bedtime  chlorhexidine 2% Cloths 1 Application(s) Topical daily  clopidogrel Tablet 75 milliGRAM(s) Oral daily  dextrose 5%. 1000 milliLiter(s) (50 mL/Hr) IV Continuous <Continuous>  dextrose 5%. 1000 milliLiter(s) (100 mL/Hr) IV Continuous <Continuous>  dextrose 50% Injectable 12.5 Gram(s) IV Push once  dextrose 50% Injectable 25 Gram(s) IV Push once  dextrose 50% Injectable 25 Gram(s) IV Push once  glucagon  Injectable 1 milliGRAM(s) IntraMuscular once  insulin lispro (ADMELOG) corrective regimen sliding scale   SubCutaneous at bedtime  insulin lispro (ADMELOG) corrective regimen sliding scale   SubCutaneous three times a day before meals  metoprolol succinate ER 25 milliGRAM(s) Oral daily  pantoprazole    Tablet 40 milliGRAM(s) Oral before breakfast  polyethylene glycol 3350 17 Gram(s) Oral daily  senna 2 Tablet(s) Oral at bedtime  sotalol 80 milliGRAM(s) Oral <User Schedule>    MEDICATIONS  (PRN):  acetaminophen     Tablet .. 975 milliGRAM(s) Oral every 6 hours PRN Temp greater or equal to 38C (100.4F), Moderate Pain (4 - 6)  dextrose Oral Gel 15 Gram(s) Oral once PRN Blood Glucose LESS THAN 70 milliGRAM(s)/deciliter      I&O's Summary    01 Nov 2023 07:01  -  02 Nov 2023 07:00  --------------------------------------------------------  IN: 650 mL / OUT: 1200 mL / NET: -550 mL    02 Nov 2023 07:01  -  02 Nov 2023 12:11  --------------------------------------------------------  IN: 240 mL / OUT: 100 mL / NET: 140 mL        PHYSICAL EXAM:  Vital Signs Last 24 Hrs  T(C): 36.5 (02 Nov 2023 11:19), Max: 36.6 (01 Nov 2023 19:19)  T(F): 97.7 (02 Nov 2023 11:19), Max: 97.9 (01 Nov 2023 19:19)  HR: 66 (02 Nov 2023 11:19) (66 - 82)  BP: 117/65 (02 Nov 2023 11:19) (103/65 - 156/81)  BP(mean): 85 (02 Nov 2023 03:10) (85 - 85)  RR: 18 (02 Nov 2023 11:19) (18 - 18)  SpO2: 98% (02 Nov 2023 11:19) (96% - 98%)    Parameters below as of 02 Nov 2023 11:19  Patient On (Oxygen Delivery Method): room air      CONSTITUTIONAL: NAD, well-developed, well-groomed  EYES: PERRLA; conjunctiva and sclera clear  ENMT: Moist oral mucosa, no pharyngeal injection or exudates; normal dentition  NECK: Supple, no palpable masses; no thyromegaly  RESPIRATORY: Normal respiratory effort; lungs are clear to auscultation bilaterally  CARDIOVASCULAR: Regular rate and rhythm, normal S1 and S2, no murmur/rub/gallop; No lower extremity edema; Peripheral pulses are 2+ bilaterally  ABDOMEN: Nontender to palpation, normoactive bowel sounds, no rebound/guarding  MUSCULOSKELETAL:  Normal gait; no clubbing or cyanosis of digits; no joint swelling or tenderness to palpation  PSYCH: A+O to person, place, and time; affect appropriate  NEUROLOGY: CN 2-12 are intact and symmetric; no gross sensory deficits   SKIN: No rashes; no palpable lesions    LABS:                        9.8    9.21  )-----------( 416      ( 02 Nov 2023 06:24 )             31.0     11-02    140  |  109<H>  |  35<H>  ----------------------------<  117<H>  4.6   |  21<L>  |  1.05    Ca    9.1      02 Nov 2023 06:24  Phos  3.1     11-01  Mg     1.9     11-01    TPro  6.1  /  Alb  2.6<L>  /  TBili  0.5  /  DBili  x   /  AST  131<H>  /  ALT  115<H>  /  AlkPhos  236<H>  11-01          Urinalysis Basic - ( 02 Nov 2023 06:24 )    Color: x / Appearance: x / SG: x / pH: x  Gluc: 117 mg/dL / Ketone: x  / Bili: x / Urobili: x   Blood: x / Protein: x / Nitrite: x   Leuk Esterase: x / RBC: x / WBC x   Sq Epi: x / Non Sq Epi: x / Bacteria: x          RADIOLOGY & ADDITIONAL TESTS:  Results Reviewed:   Imaging Personally Reviewed:  Electrocardiogram Personally Reviewed:   [facility] Division of Hospital Medicine  Armando Seth  MS Teams      SUBJECTIVE / OVERNIGHT EVENTS:  No events overnight  denies cp/sob/palpitations  Tele reviewed: sinus with pvcs and couplets  ADDITIONAL REVIEW OF SYSTEMS:    MEDICATIONS  (STANDING):  allopurinol 200 milliGRAM(s) Oral daily  apixaban 5 milliGRAM(s) Oral two times a day  atorvastatin 80 milliGRAM(s) Oral at bedtime  chlorhexidine 2% Cloths 1 Application(s) Topical daily  clopidogrel Tablet 75 milliGRAM(s) Oral daily  dextrose 5%. 1000 milliLiter(s) (50 mL/Hr) IV Continuous <Continuous>  dextrose 5%. 1000 milliLiter(s) (100 mL/Hr) IV Continuous <Continuous>  dextrose 50% Injectable 12.5 Gram(s) IV Push once  dextrose 50% Injectable 25 Gram(s) IV Push once  dextrose 50% Injectable 25 Gram(s) IV Push once  glucagon  Injectable 1 milliGRAM(s) IntraMuscular once  insulin lispro (ADMELOG) corrective regimen sliding scale   SubCutaneous at bedtime  insulin lispro (ADMELOG) corrective regimen sliding scale   SubCutaneous three times a day before meals  metoprolol succinate ER 25 milliGRAM(s) Oral daily  pantoprazole    Tablet 40 milliGRAM(s) Oral before breakfast  polyethylene glycol 3350 17 Gram(s) Oral daily  senna 2 Tablet(s) Oral at bedtime  sotalol 80 milliGRAM(s) Oral <User Schedule>    MEDICATIONS  (PRN):  acetaminophen     Tablet .. 975 milliGRAM(s) Oral every 6 hours PRN Temp greater or equal to 38C (100.4F), Moderate Pain (4 - 6)  dextrose Oral Gel 15 Gram(s) Oral once PRN Blood Glucose LESS THAN 70 milliGRAM(s)/deciliter      I&O's Summary    01 Nov 2023 07:01  -  02 Nov 2023 07:00  --------------------------------------------------------  IN: 650 mL / OUT: 1200 mL / NET: -550 mL    02 Nov 2023 07:01  -  02 Nov 2023 12:11  --------------------------------------------------------  IN: 240 mL / OUT: 100 mL / NET: 140 mL        PHYSICAL EXAM:  Vital Signs Last 24 Hrs  T(C): 36.5 (02 Nov 2023 11:19), Max: 36.6 (01 Nov 2023 19:19)  T(F): 97.7 (02 Nov 2023 11:19), Max: 97.9 (01 Nov 2023 19:19)  HR: 66 (02 Nov 2023 11:19) (66 - 82)  BP: 117/65 (02 Nov 2023 11:19) (103/65 - 156/81)  BP(mean): 85 (02 Nov 2023 03:10) (85 - 85)  RR: 18 (02 Nov 2023 11:19) (18 - 18)  SpO2: 98% (02 Nov 2023 11:19) (96% - 98%)    Parameters below as of 02 Nov 2023 11:19  Patient On (Oxygen Delivery Method): room air      CONSTITUTIONAL: NAD, well-developed, well-groomed  EYES: PERRLA; conjunctiva and sclera clear  NECK: Supple, no palpable masses  RESPIRATORY: Normal respiratory effort; lungs are clear to auscultation bilaterally  CARDIOVASCULAR: rrr, normal S1 and S2, no murmur: trace LE edema; Peripheral pulses are 2+ bilaterally  ABDOMEN: Nontender to palpation, normoactive bowel sounds, no rebound/guarding  MUSCULOSKELETAL:  no clubbing or cyanosis of digits; no joint swelling or tenderness to palpation  PSYCH: A+O to person, place, and time; affect appropriate  NEUROLOGY: CN 2-12 are intact and symmetric; no gross sensory deficits   SKIN: chronic venous stasis changes    LABS:                        9.8    9.21  )-----------( 416      ( 02 Nov 2023 06:24 )             31.0     11-02    140  |  109<H>  |  35<H>  ----------------------------<  117<H>  4.6   |  21<L>  |  1.05    Ca    9.1      02 Nov 2023 06:24  Phos  3.1     11-01  Mg     1.9     11-01    TPro  6.1  /  Alb  2.6<L>  /  TBili  0.5  /  DBili  x   /  AST  131<H>  /  ALT  115<H>  /  AlkPhos  236<H>  11-01          Urinalysis Basic - ( 02 Nov 2023 06:24 )    Color: x / Appearance: x / SG: x / pH: x  Gluc: 117 mg/dL / Ketone: x  / Bili: x / Urobili: x   Blood: x / Protein: x / Nitrite: x   Leuk Esterase: x / RBC: x / WBC x   Sq Epi: x / Non Sq Epi: x / Bacteria: x

## 2023-11-02 NOTE — ADVANCED PRACTICE NURSE CONSULT - REASON FOR CONSULT
Requested by staff RN to evaluate for suspected Deep Tissue Injury.. PMH is noted:  Patient is a 78y old  Male who presents with a chief complaint of Chest pain (02 Nov 2023 12:08)  78 M with hx of CAD s/p NURIA to distal LAD in 6/2023, HTN, HLD, EtOH disorder, T2DM, spinal surgeries, neuroendocrine tumor on Lutathera (first treatment on Friday 10/20 at [facility] [place]), presenting with fatigue and chest pain, fever + SIRS criteria. Concern for reaction to Lutathera vs progression of disease given start of symptoms. C/B afib with RVR, s/p DCCV. Trialing sotalol.

## 2023-11-02 NOTE — DIETITIAN INITIAL EVALUATION ADULT - PERSON TAUGHT/METHOD
PO intake encouraged. further education deferred at this time as pt malnourished and PO intake desired./verbal instruction/patient instructed

## 2023-11-02 NOTE — DIETITIAN INITIAL EVALUATION ADULT - WEIGHT FOR BMI (LBS)
Group Topic:  Group OT    Date: 10/25/2023  Start Time: 1500  End Time: 1545  Facilitators: Azeb Johnson OTA    Focus: Positive thought provoking questions.  Number in attendance: 8    Pt engage in reading off thought provoking question on beach ball and have insightful and positive discussions on each question and each individual answer's.Purpose is to provoke positive thinking to help increase motivation and to build social interaction skills.         Method: Group  Attendance: not present for group.    Pt was recruited for group but did not attend, efforts to encourage participation in programming on unit will continue.     254.4

## 2023-11-02 NOTE — PROGRESS NOTE ADULT - ASSESSMENT
78 M with hx of CAD s/p NURIA to distal LAD in 6/2023, HTN, HLD, EtOH disorder, T2DM, spinal surgeries, neuroendocrine tumor on Lutathera (first treatment on Friday 10/20 at [facility]), presenting with fatigue and chest pain, fever + SIRS criteria. Concern for reaction to Lutathera vs progression of disease given start of symptoms. C/B afib with RVR, s/p DCCV. Trialing sotalol.

## 2023-11-02 NOTE — DIETITIAN NUTRITION RISK NOTIFICATION - TREATMENT: THE FOLLOWING DIET HAS BEEN RECOMMENDED
Diet, Regular:   Consistent Carbohydrate {No Snacks} (CSTCHO)  DASH/TLC {Sodium & Cholesterol Restricted} (DASH)  Supplement Feeding Modality:  Oral  Glucerna Shake Cans or Servings Per Day:  2       Frequency:  Daily (10-28-23 @ 13:37) [Active]

## 2023-11-02 NOTE — ADVANCED PRACTICE NURSE CONSULT - ASSESSMENT
Pt is awake & alert, OOB to chair. Pt is ambulatory with a cane. When in bed pt is on a low air loss & pressure redistribution surface & is turned & positioned as per nursing documentation. His skin turgor is fair & he has dry skin noted on extremities. Pt is continent of bladder & bowels.  The following was noted:  Pt c/o discomfort on left upper back but area is not discolored or edematous. There is a red thin line noted above this area which pt states that it does not bother him. Pt was cardioverted previously.  Left buttock area with Deep tissue Injury noted. Area measures 5cm(l) x 5cm(w) x 0cm (d). Area is deep red in color w/ dry skin. Pt reports that he has had this area for several months & it has improved. He states that he sits for long periods of time next to his bedbound spouse. He reports that he puts "some cream on it but I do not know name".

## 2023-11-03 ENCOUNTER — TRANSCRIPTION ENCOUNTER (OUTPATIENT)
Age: 78
End: 2023-11-03

## 2023-11-03 LAB
ANION GAP SERPL CALC-SCNC: 13 MMOL/L — SIGNIFICANT CHANGE UP (ref 5–17)
ANION GAP SERPL CALC-SCNC: 13 MMOL/L — SIGNIFICANT CHANGE UP (ref 5–17)
BUN SERPL-MCNC: 36 MG/DL — HIGH (ref 7–23)
BUN SERPL-MCNC: 36 MG/DL — HIGH (ref 7–23)
CALCIUM SERPL-MCNC: 8.8 MG/DL — SIGNIFICANT CHANGE UP (ref 8.4–10.5)
CALCIUM SERPL-MCNC: 8.8 MG/DL — SIGNIFICANT CHANGE UP (ref 8.4–10.5)
CHLORIDE SERPL-SCNC: 107 MMOL/L — SIGNIFICANT CHANGE UP (ref 96–108)
CHLORIDE SERPL-SCNC: 107 MMOL/L — SIGNIFICANT CHANGE UP (ref 96–108)
CO2 SERPL-SCNC: 18 MMOL/L — LOW (ref 22–31)
CO2 SERPL-SCNC: 18 MMOL/L — LOW (ref 22–31)
CREAT SERPL-MCNC: 1.03 MG/DL — SIGNIFICANT CHANGE UP (ref 0.5–1.3)
CREAT SERPL-MCNC: 1.03 MG/DL — SIGNIFICANT CHANGE UP (ref 0.5–1.3)
EGFR: 74 ML/MIN/1.73M2 — SIGNIFICANT CHANGE UP
EGFR: 74 ML/MIN/1.73M2 — SIGNIFICANT CHANGE UP
GLUCOSE BLDC GLUCOMTR-MCNC: 107 MG/DL — HIGH (ref 70–99)
GLUCOSE BLDC GLUCOMTR-MCNC: 107 MG/DL — HIGH (ref 70–99)
GLUCOSE BLDC GLUCOMTR-MCNC: 122 MG/DL — HIGH (ref 70–99)
GLUCOSE BLDC GLUCOMTR-MCNC: 122 MG/DL — HIGH (ref 70–99)
GLUCOSE BLDC GLUCOMTR-MCNC: 131 MG/DL — HIGH (ref 70–99)
GLUCOSE BLDC GLUCOMTR-MCNC: 131 MG/DL — HIGH (ref 70–99)
GLUCOSE BLDC GLUCOMTR-MCNC: 139 MG/DL — HIGH (ref 70–99)
GLUCOSE BLDC GLUCOMTR-MCNC: 139 MG/DL — HIGH (ref 70–99)
GLUCOSE SERPL-MCNC: 112 MG/DL — HIGH (ref 70–99)
GLUCOSE SERPL-MCNC: 112 MG/DL — HIGH (ref 70–99)
HCT VFR BLD CALC: 31.3 % — LOW (ref 39–50)
HCT VFR BLD CALC: 31.3 % — LOW (ref 39–50)
HGB BLD-MCNC: 9.9 G/DL — LOW (ref 13–17)
HGB BLD-MCNC: 9.9 G/DL — LOW (ref 13–17)
MAGNESIUM SERPL-MCNC: 1.9 MG/DL — SIGNIFICANT CHANGE UP (ref 1.6–2.6)
MAGNESIUM SERPL-MCNC: 1.9 MG/DL — SIGNIFICANT CHANGE UP (ref 1.6–2.6)
MCHC RBC-ENTMCNC: 29.5 PG — SIGNIFICANT CHANGE UP (ref 27–34)
MCHC RBC-ENTMCNC: 29.5 PG — SIGNIFICANT CHANGE UP (ref 27–34)
MCHC RBC-ENTMCNC: 31.6 GM/DL — LOW (ref 32–36)
MCHC RBC-ENTMCNC: 31.6 GM/DL — LOW (ref 32–36)
MCV RBC AUTO: 93.2 FL — SIGNIFICANT CHANGE UP (ref 80–100)
MCV RBC AUTO: 93.2 FL — SIGNIFICANT CHANGE UP (ref 80–100)
NRBC # BLD: 0 /100 WBCS — SIGNIFICANT CHANGE UP (ref 0–0)
NRBC # BLD: 0 /100 WBCS — SIGNIFICANT CHANGE UP (ref 0–0)
PLATELET # BLD AUTO: 404 K/UL — HIGH (ref 150–400)
PLATELET # BLD AUTO: 404 K/UL — HIGH (ref 150–400)
POTASSIUM SERPL-MCNC: 4 MMOL/L — SIGNIFICANT CHANGE UP (ref 3.5–5.3)
POTASSIUM SERPL-MCNC: 4 MMOL/L — SIGNIFICANT CHANGE UP (ref 3.5–5.3)
POTASSIUM SERPL-SCNC: 4 MMOL/L — SIGNIFICANT CHANGE UP (ref 3.5–5.3)
POTASSIUM SERPL-SCNC: 4 MMOL/L — SIGNIFICANT CHANGE UP (ref 3.5–5.3)
RBC # BLD: 3.36 M/UL — LOW (ref 4.2–5.8)
RBC # BLD: 3.36 M/UL — LOW (ref 4.2–5.8)
RBC # FLD: 14.8 % — HIGH (ref 10.3–14.5)
RBC # FLD: 14.8 % — HIGH (ref 10.3–14.5)
SODIUM SERPL-SCNC: 138 MMOL/L — SIGNIFICANT CHANGE UP (ref 135–145)
SODIUM SERPL-SCNC: 138 MMOL/L — SIGNIFICANT CHANGE UP (ref 135–145)
WBC # BLD: 8.58 K/UL — SIGNIFICANT CHANGE UP (ref 3.8–10.5)
WBC # BLD: 8.58 K/UL — SIGNIFICANT CHANGE UP (ref 3.8–10.5)
WBC # FLD AUTO: 8.58 K/UL — SIGNIFICANT CHANGE UP (ref 3.8–10.5)
WBC # FLD AUTO: 8.58 K/UL — SIGNIFICANT CHANGE UP (ref 3.8–10.5)

## 2023-11-03 PROCEDURE — 99232 SBSQ HOSP IP/OBS MODERATE 35: CPT

## 2023-11-03 PROCEDURE — 99233 SBSQ HOSP IP/OBS HIGH 50: CPT | Mod: FS,GC

## 2023-11-03 PROCEDURE — 93010 ELECTROCARDIOGRAM REPORT: CPT | Mod: 77

## 2023-11-03 RX ORDER — SOTALOL HCL 120 MG
80 TABLET ORAL EVERY 12 HOURS
Refills: 0 | Status: DISCONTINUED | OUTPATIENT
Start: 2023-11-03 | End: 2023-11-04

## 2023-11-03 RX ADMIN — APIXABAN 5 MILLIGRAM(S): 2.5 TABLET, FILM COATED ORAL at 06:17

## 2023-11-03 RX ADMIN — Medication 25 MILLIGRAM(S): at 06:17

## 2023-11-03 RX ADMIN — Medication 10 MILLIGRAM(S): at 06:16

## 2023-11-03 RX ADMIN — Medication 10 MILLIGRAM(S): at 21:59

## 2023-11-03 RX ADMIN — Medication 200 MILLIGRAM(S): at 11:53

## 2023-11-03 RX ADMIN — APIXABAN 5 MILLIGRAM(S): 2.5 TABLET, FILM COATED ORAL at 18:27

## 2023-11-03 RX ADMIN — PANTOPRAZOLE SODIUM 40 MILLIGRAM(S): 20 TABLET, DELAYED RELEASE ORAL at 06:16

## 2023-11-03 RX ADMIN — CLOPIDOGREL BISULFATE 75 MILLIGRAM(S): 75 TABLET, FILM COATED ORAL at 11:51

## 2023-11-03 RX ADMIN — Medication 80 MILLIGRAM(S): at 06:16

## 2023-11-03 RX ADMIN — ATORVASTATIN CALCIUM 80 MILLIGRAM(S): 80 TABLET, FILM COATED ORAL at 21:59

## 2023-11-03 RX ADMIN — Medication 80 MILLIGRAM(S): at 18:28

## 2023-11-03 RX ADMIN — CHLORHEXIDINE GLUCONATE 1 APPLICATION(S): 213 SOLUTION TOPICAL at 10:00

## 2023-11-03 NOTE — PROGRESS NOTE ADULT - PROBLEM SELECTOR PROBLEM 3
Chronic stable angina
Chronic stable angina
T2DM (type 2 diabetes mellitus)
Chronic stable angina
Palliative care encounter
RAZIA (acute kidney injury)
T2DM (type 2 diabetes mellitus)
RAZIA (acute kidney injury)
T2DM (type 2 diabetes mellitus)
Chronic stable angina

## 2023-11-03 NOTE — PROGRESS NOTE ADULT - TIME BILLING
reviewing emr, labs, telemetry, discussion with patient, documentation
time spent reviewing prior charts, meds, discussing plan with patient and consultants= 52 minutes
reviewing emr, labs, telemetry, discussion with patient, documentation
reviewing emr, labs, telemetry, discussion with patient, documentation
interview physical coordination with specialists medical decision making and documentation
reviewing emr, labs, telemetry, discussion with patient, documentation
reviewing emr, labs, telemetry, discussion with patient, documentation

## 2023-11-03 NOTE — PROGRESS NOTE ADULT - PROBLEM SELECTOR PLAN 5
On Metformin 1000mg BID. A1c 7.5 in 6/2023  - Start low insulin sliding scale  - FS before meals and at bedtime.   - CC diet
On treatment with Lutathera s/p first treatment on 10/20. Pt to get treatment every 2 months for total of 4 doses.   - CT with worsening liver mets.  - Noted to have elevated LFTs on CMP. May be due to Lutathera which has been shown to cause hepatoxicity.  - Manage symptoms supportively for now.    as above f/u onc
On Metformin 1000mg BID. A1c 7.5 in 6/2023  - ISS  - FS before meals and at bedtime.   - CC diet
On Metformin 1000mg BID. A1c 7.5 in 6/2023  - Start low insulin sliding scale  - FS before meals and at bedtime.   - CC diet
On Metformin 1000mg BID. A1c 7.5 in 6/2023  - ISS  - FS before meals and at bedtime.   - CC diet
On treatment with Lutathera s/p first treatment on 10/20. Pt to get treatment every 2 months for total of 4 doses.   - CT with worsening liver mets.  - Noted to have elevated LFTs on CMP. May be due to Lutathera which has been shown to cause hepatoxicity.  - Manage symptoms supportively for now.    as above f/u onc
On Metformin 1000mg BID. A1c 7.5 in 6/2023  - Start low insulin sliding scale  - FS before meals and at bedtime.   - CC diet
C 6/23/23: 90% stenosis in distal LAD, DESx1 to distal LAD  TTE 6/2023: EF 50-55% with grade 2 diastolic dysfunction.   - Continue Plavix  - Continue Crestor  - Continue metoprolol XL
C 6/23/23: 90% stenosis in distal LAD, DESx1 to distal LAD  TTE 6/2023: EF 50-55% with grade 2 diastolic dysfunction.   - Continue Plavix  - Continue Crestor  - Continue metoprolol XL
On treatment with Lutathera s/p first treatment on 10/20. Pt to get treatment every 2 months for total of 4 doses.   - CT with worsening liver mets.  - Noted to have elevated LFTs on CMP. May be due to Lutathera which has been shown to cause hepatoxicity.  - Manage symptoms supportively for now.    as above f/u onc
On Metformin 1000mg BID. A1c 7.5 in 6/2023  - Start low insulin sliding scale  - FS before meals and at bedtime.   - CC diet

## 2023-11-03 NOTE — DISCHARGE NOTE NURSING/CASE MANAGEMENT/SOCIAL WORK - PATIENT PORTAL LINK FT
You can access the FollowMyHealth Patient Portal offered by [facility] by registering at the following website: [email or web address]. By joining [email or web address]’s FollowMyHealth portal, you will also be able to view your health information using other applications (apps) compatible with our system.

## 2023-11-03 NOTE — PROGRESS NOTE ADULT - PROBLEM SELECTOR PROBLEM 5
Neuroendocrine cancer
T2DM (type 2 diabetes mellitus)
CAD (coronary artery disease)
T2DM (type 2 diabetes mellitus)
Neuroendocrine cancer
T2DM (type 2 diabetes mellitus)
T2DM (type 2 diabetes mellitus)
CAD (coronary artery disease)
T2DM (type 2 diabetes mellitus)
Neuroendocrine cancer
T2DM (type 2 diabetes mellitus)

## 2023-11-03 NOTE — CHART NOTE - NSCHARTNOTEFT_GEN_A_CORE
ECG reviewed s/p 5th dose sotatol. QTC: 462 ms. Plan to continue sotatol as prescribed.    Malou Chavez PA-C  Department of Medicine  [id number]

## 2023-11-03 NOTE — PROGRESS NOTE ADULT - SUBJECTIVE AND OBJECTIVE BOX
24H hour events: SR  60's, 6 bts WCT overnight, brief AIVR overnight   S/p Sotalol dose #4 this AM    MEDICATIONS:  apixaban 5 milliGRAM(s) Oral two times a day  clopidogrel Tablet 75 milliGRAM(s) Oral daily  hydrALAZINE 10 milliGRAM(s) Oral three times a day  metoprolol succinate ER 25 milliGRAM(s) Oral daily  sotalol 80 milliGRAM(s) Oral <User Schedule>        acetaminophen     Tablet .. 975 milliGRAM(s) Oral every 6 hours PRN    pantoprazole    Tablet 40 milliGRAM(s) Oral before breakfast  polyethylene glycol 3350 17 Gram(s) Oral daily  senna 2 Tablet(s) Oral at bedtime    allopurinol 200 milliGRAM(s) Oral daily  atorvastatin 80 milliGRAM(s) Oral at bedtime  dextrose 50% Injectable 12.5 Gram(s) IV Push once  dextrose 50% Injectable 25 Gram(s) IV Push once  dextrose 50% Injectable 25 Gram(s) IV Push once  dextrose Oral Gel 15 Gram(s) Oral once PRN  glucagon  Injectable 1 milliGRAM(s) IntraMuscular once  insulin lispro (ADMELOG) corrective regimen sliding scale   SubCutaneous three times a day before meals  insulin lispro (ADMELOG) corrective regimen sliding scale   SubCutaneous at bedtime    chlorhexidine 2% Cloths 1 Application(s) Topical daily  dextrose 5%. 1000 milliLiter(s) IV Continuous <Continuous>  dextrose 5%. 1000 milliLiter(s) IV Continuous <Continuous>      REVIEW OF SYSTEMS:  See HPI, otherwise ROS negative.    PHYSICAL EXAM:  T(C): 36.4 (11-03-23 @ 05:38), Max: 36.7 (11-02-23 @ 19:58)  HR: 65 (11-03-23 @ 05:38) (65 - 66)  BP: 121/71 (11-03-23 @ 05:38) (101/62 - 121/71)  RR: 18 (11-03-23 @ 05:38) (18 - 18)  SpO2: 96% (11-03-23 @ 05:38) (96% - 98%)  Wt(kg): --  I&O's Summary    02 Nov 2023 07:01  -  03 Nov 2023 07:00  --------------------------------------------------------  IN: 660 mL / OUT: 1400 mL / NET: -740 mL    03 Nov 2023 07:01  -  03 Nov 2023 10:40  --------------------------------------------------------  IN: 200 mL / OUT: 0 mL / NET: 200 mL        Appearance: Alert. NAD	  Cardiovascular: +S1S2 RRR no m/g/r  Respiratory: CTA B/L	  Psychiatry: A & O x 3, Mood & affect appropriate  Gastrointestinal:  Soft, NT. ND. +BS	  Skin: No rashes	masses or lesions  Neurologic: Non-focal  Extremities: No edema BLE  Vascular: Peripheral pulses palpable 2+ bilaterally      LABS:	 	    CBC Full  -  ( 03 Nov 2023 06:38 )  WBC Count : 8.58 K/uL  Hemoglobin : 9.9 g/dL  Hematocrit : 31.3 %  Platelet Count - Automated : 404 K/uL  Mean Cell Volume : 93.2 fl  Mean Cell Hemoglobin : 29.5 pg  Mean Cell Hemoglobin Concentration : 31.6 gm/dL  Auto Neutrophil # : x  Auto Lymphocyte # : x  Auto Monocyte # : x  Auto Eosinophil # : x  Auto Basophil # : x  Auto Neutrophil % : x  Auto Lymphocyte % : x  Auto Monocyte % : x  Auto Eosinophil % : x  Auto Basophil % : x    11-03    138  |  107  |  36<H>  ----------------------------<  112<H>  4.0   |  18<L>  |  1.03  11-02    140  |  109<H>  |  35<H>  ----------------------------<  117<H>  4.6   |  21<L>  |  1.05    Ca    8.8      03 Nov 2023 06:39  Ca    9.1      02 Nov 2023 06:24  Mg     1.9     11-03        proBNP: Pro-Brain Natriuretic Peptide: 4171 pg/mL (10.26.23 @ 06:59)      TSH: Thyroid Stimulating Hormone, Serum in AM (10.31.23 @ 07:06)    Thyroid Stimulating Hormone, Serum: 3.74 uIU/mL      TELEMETRY: SR 60's   	    ECG:  	S/p Sotalol 80mg dose #3 QTC 451ms   S/p Sotalol 80mg dose #4: SR w/ PAC's 70, QTC 518ms, QRS 90ms     TRACY: < from: TRACY W or WO Ultrasound Enhancing Agent (10.31.23 @ 09:34) >  _______________________________________________________________________________________     CONCLUSIONS:     1. Left ventricular systolic function is normal with an ejection fraction visually estimated at 60 to 65 %.   2. Normal right ventricular cavity size, wall thickness, and systolic function.   3. No evidence of left atrial or left atrial appendage thrombus. The left atrial appendage emptying velocity is low at 24 cm/s.   4. Minimal (grade 1) spontaneous echo contrast located in the left atrial apendage.   5. Mild mitral regurgitation. The mitral regurgitant jet is centrally directed. Mechanism of mitral regurgitation: The mechanism of mitral regurgitation: Gabbie Type I (normal leaflet motion with dilated annulus). Blunting of the pulmonary venous flow consistent with elevated left atrial pressure.   6. No pericardial effusion seen.   7. Compared to the transthoracic echocardiogram performed on 10/25/2023 there have been no significant interval changes.    ____________________________________________________________________  TRACY Procedure:  After discussion of the risks and benefits ofthe TRACY, an informed consent was obtained. Study was performed with sedation - see anesthesia record. Images were obtained with the patient in a left lateral decubitus position. Image quality was good. The patient's vital signs; including heart rate,blood pressure, and oxygen saturation; remained stable throughout the procedure. The patient tolerated the procedure well and without complications.  ________________________________________________________________________________________  Cardioversion Procedure:  Written, informed consent to proceed with cardioversion was obtained prior to administering conscious sedation. After the transesophageal echocardiogram demonstrated no left atrial appendage thrombus, it was decided to proceed with cardioversion. The patient was preoxygenated with oxygen by face mask. The patient was monitored throughout the procedure by a nurse. The patient was shocked at 200 J on a biphasic defibrillator. The patient was successfully cardioverted to sinus rhythm.Cardiac rhythm was confirmed with a 12-lead electrocardiogram. There were no complications from the cardioversion procedure. The attending physician was present for the entire procedure.  ________________________________________________________________________________________  FINDINGS:     Left Ventricle:  Left ventricular systolic function is normal with an ejection fraction visually estimated at 60 to 65%. Unable to assess left ventricular diastolic function due to insufficient data.     RightVentricle:  The right ventricular cavity is normal in size, normal wall thickness and normal systolic function.     Left Atrium:  There is minimal (grade 1) spontaneous echo contrast located in the left atrial apendage. There is no evidence of left atrial or left atrial appendage thrombus. The left atrial appendage emptying velocity is low at 24 cm/s (normal >40cm/s).     Right Atrium:  There is no evidence of right atrial or right atrial appendage thrombus.     Interatrial Septum:  There is no evidence of a patent foramen ovale by color flow Doppler.     Aortic Valve:  The aortic valve appears trileaflet. There is fibrocalcific aortic valve sclerosis without stenosis. There is mild aortic regurgitation.     Mitral Valve:  Structurally normalmitral valve with normal leaflet excursion. There is mild mitral regurgitation. The mitral regurgitant jet is centrally directed. Mechanism of mitral regurgitation: The mechanism of mitral regurgitation: Gabbei Type I (normal leaflet motion with dilated annulus). There is blunting of the pulmonary venous flow consistent with elevated left atrial pressure.     Tricuspid Valve:  Structurally normal tricuspid valve with normal leaflet excursion. There is moderate tricuspid regurgitation. Mild pulmonary hypertension.     Pulmonic Valve:  Structurally normal pulmonic valve with normal leaflet excursion. There is trace pulmonic regurgitation.     Aorta:  The aortic root at the sinuses of Valsalva is normal in size. There is mild non-mobile atheroma in the visualized portions of the descending aorta.     Pericardium:  No pericardial effusion seen.  ____________________________________________________________________  Quantitative Data:  Left Ventricle Measurements: (Indexed to BSA)     Visualized LV EF%: 60 to 65%    Aorta Measurements: (normal range) (Indexed to BSA)     Sinuses of Valsalva: 3.20 cm (3.1 - 3.7 cm)       Tricuspid Valve Measurements:     TR Vmax:          3.3 m/s  TR Peak Gradient: 43.0 mmHg       --------------------------------------------------------------------------------  TomTec:  LV Analysis:  EF: 62 %       ________________________________________________________________________________________  Electronically signed on 10/31/2023 at 11:12:05 AM by Jason Carter    < end of copied text >     24H hour events: SR  60's, 6 bts WCT overnight, brief AIVR overnight   S/p Sotalol dose #4 this AM    MEDICATIONS:  apixaban 5 milliGRAM(s) Oral two times a day  clopidogrel Tablet 75 milliGRAM(s) Oral daily  hydrALAZINE 10 milliGRAM(s) Oral three times a day  metoprolol succinate ER 25 milliGRAM(s) Oral daily  sotalol 80 milliGRAM(s) Oral <User Schedule>        acetaminophen     Tablet .. 975 milliGRAM(s) Oral every 6 hours PRN    pantoprazole    Tablet 40 milliGRAM(s) Oral before breakfast  polyethylene glycol 3350 17 Gram(s) Oral daily  senna 2 Tablet(s) Oral at bedtime    allopurinol 200 milliGRAM(s) Oral daily  atorvastatin 80 milliGRAM(s) Oral at bedtime  dextrose 50% Injectable 12.5 Gram(s) IV Push once  dextrose 50% Injectable 25 Gram(s) IV Push once  dextrose 50% Injectable 25 Gram(s) IV Push once  dextrose Oral Gel 15 Gram(s) Oral once PRN  glucagon  Injectable 1 milliGRAM(s) IntraMuscular once  insulin lispro (ADMELOG) corrective regimen sliding scale   SubCutaneous three times a day before meals  insulin lispro (ADMELOG) corrective regimen sliding scale   SubCutaneous at bedtime    chlorhexidine 2% Cloths 1 Application(s) Topical daily  dextrose 5%. 1000 milliLiter(s) IV Continuous <Continuous>  dextrose 5%. 1000 milliLiter(s) IV Continuous <Continuous>      REVIEW OF SYSTEMS:  See HPI, otherwise ROS negative.    PHYSICAL EXAM:  T(C): 36.4 (11-03-23 @ 05:38), Max: 36.7 (11-02-23 @ 19:58)  HR: 65 (11-03-23 @ 05:38) (65 - 66)  BP: 121/71 (11-03-23 @ 05:38) (101/62 - 121/71)  RR: 18 (11-03-23 @ 05:38) (18 - 18)  SpO2: 96% (11-03-23 @ 05:38) (96% - 98%)  Wt(kg): --  I&O's Summary    02 Nov 2023 07:01  -  03 Nov 2023 07:00  --------------------------------------------------------  IN: 660 mL / OUT: 1400 mL / NET: -740 mL    03 Nov 2023 07:01  -  03 Nov 2023 10:40  --------------------------------------------------------  IN: 200 mL / OUT: 0 mL / NET: 200 mL        Appearance: Alert. NAD	  Cardiovascular: +S1S2 RRR no m/g/r  Respiratory: CTA B/L	  Psychiatry: A & O x 3, Mood & affect appropriate  Gastrointestinal:  Soft, NT. ND. +BS	  Skin: No rashes	masses or lesions  Neurologic: Non-focal  Extremities: No edema BLE  Vascular: Peripheral pulses palpable 2+ bilaterally      LABS:	 	    CBC Full  -  ( 03 Nov 2023 06:38 )  WBC Count : 8.58 K/uL  Hemoglobin : 9.9 g/dL  Hematocrit : 31.3 %  Platelet Count - Automated : 404 K/uL  Mean Cell Volume : 93.2 fl  Mean Cell Hemoglobin : 29.5 pg  Mean Cell Hemoglobin Concentration : 31.6 gm/dL  Auto Neutrophil # : x  Auto Lymphocyte # : x  Auto Monocyte # : x  Auto Eosinophil # : x  Auto Basophil # : x  Auto Neutrophil % : x  Auto Lymphocyte % : x  Auto Monocyte % : x  Auto Eosinophil % : x  Auto Basophil % : x    11-03    138  |  107  |  36<H>  ----------------------------<  112<H>  4.0   |  18<L>  |  1.03  11-02    140  |  109<H>  |  35<H>  ----------------------------<  117<H>  4.6   |  21<L>  |  1.05    Ca    8.8      03 Nov 2023 06:39  Ca    9.1      02 Nov 2023 06:24  Mg     1.9     11-03        proBNP: Pro-Brain Natriuretic Peptide: 4171 pg/mL (10.26.23 @ 06:59)      TSH: Thyroid Stimulating Hormone, Serum in AM (10.31.23 @ 07:06)    Thyroid Stimulating Hormone, Serum: 3.74 uIU/mL      TELEMETRY: SR 60's   	    ECG:  	S/p Sotalol 80mg dose #3 QTC 451ms   S/p Sotalol 80mg dose #4: SR w/ PAC's 70,  (when calculated manually @ HR 60) QRS 90ms     TRACY: < from: TRACY W or WO Ultrasound Enhancing Agent (10.31.23 @ 09:34) >  _______________________________________________________________________________________     CONCLUSIONS:     1. Left ventricular systolic function is normal with an ejection fraction visually estimated at 60 to 65 %.   2. Normal right ventricular cavity size, wall thickness, and systolic function.   3. No evidence of left atrial or left atrial appendage thrombus. The left atrial appendage emptying velocity is low at 24 cm/s.   4. Minimal (grade 1) spontaneous echo contrast located in the left atrial apendage.   5. Mild mitral regurgitation. The mitral regurgitant jet is centrally directed. Mechanism of mitral regurgitation: The mechanism of mitral regurgitation: Gabbie Type I (normal leaflet motion with dilated annulus). Blunting of the pulmonary venous flow consistent with elevated left atrial pressure.   6. No pericardial effusion seen.   7. Compared to the transthoracic echocardiogram performed on 10/25/2023 there have been no significant interval changes.    ____________________________________________________________________  TRACY Procedure:  After discussion of the risks and benefits ofthe TRACY, an informed consent was obtained. Study was performed with sedation - see anesthesia record. Images were obtained with the patient in a left lateral decubitus position. Image quality was good. The patient's vital signs; including heart rate,blood pressure, and oxygen saturation; remained stable throughout the procedure. The patient tolerated the procedure well and without complications.  ________________________________________________________________________________________  Cardioversion Procedure:  Written, informed consent to proceed with cardioversion was obtained prior to administering conscious sedation. After the transesophageal echocardiogram demonstrated no left atrial appendage thrombus, it was decided to proceed with cardioversion. The patient was preoxygenated with oxygen by face mask. The patient was monitored throughout the procedure by a nurse. The patient was shocked at 200 J on a biphasic defibrillator. The patient was successfully cardioverted to sinus rhythm.Cardiac rhythm was confirmed with a 12-lead electrocardiogram. There were no complications from the cardioversion procedure. The attending physician was present for the entire procedure.  ________________________________________________________________________________________  FINDINGS:     Left Ventricle:  Left ventricular systolic function is normal with an ejection fraction visually estimated at 60 to 65%. Unable to assess left ventricular diastolic function due to insufficient data.     RightVentricle:  The right ventricular cavity is normal in size, normal wall thickness and normal systolic function.     Left Atrium:  There is minimal (grade 1) spontaneous echo contrast located in the left atrial apendage. There is no evidence of left atrial or left atrial appendage thrombus. The left atrial appendage emptying velocity is low at 24 cm/s (normal >40cm/s).     Right Atrium:  There is no evidence of right atrial or right atrial appendage thrombus.     Interatrial Septum:  There is no evidence of a patent foramen ovale by color flow Doppler.     Aortic Valve:  The aortic valve appears trileaflet. There is fibrocalcific aortic valve sclerosis without stenosis. There is mild aortic regurgitation.     Mitral Valve:  Structurally normalmitral valve with normal leaflet excursion. There is mild mitral regurgitation. The mitral regurgitant jet is centrally directed. Mechanism of mitral regurgitation: The mechanism of mitral regurgitation: Gabbie Type I (normal leaflet motion with dilated annulus). There is blunting of the pulmonary venous flow consistent with elevated left atrial pressure.     Tricuspid Valve:  Structurally normal tricuspid valve with normal leaflet excursion. There is moderate tricuspid regurgitation. Mild pulmonary hypertension.     Pulmonic Valve:  Structurally normal pulmonic valve with normal leaflet excursion. There is trace pulmonic regurgitation.     Aorta:  The aortic root at the sinuses of Valsalva is normal in size. There is mild non-mobile atheroma in the visualized portions of the descending aorta.     Pericardium:  No pericardial effusion seen.  ____________________________________________________________________  Quantitative Data:  Left Ventricle Measurements: (Indexed to BSA)     Visualized LV EF%: 60 to 65%    Aorta Measurements: (normal range) (Indexed to BSA)     Sinuses of Valsalva: 3.20 cm (3.1 - 3.7 cm)       Tricuspid Valve Measurements:     TR Vmax:          3.3 m/s  TR Peak Gradient: 43.0 mmHg       --------------------------------------------------------------------------------  TomTec:  LV Analysis:  EF: 62 %       ________________________________________________________________________________________  Electronically signed on 10/31/2023 at 11:12:05 AM by Jason [email or web address]    < end of copied text >

## 2023-11-03 NOTE — PROGRESS NOTE ADULT - PROBLEM SELECTOR PLAN 6
LHC 6/23/23: 90% stenosis in distal LAD, DESx1 to distal LAD  TTE 6/2023: EF 50-55% with grade 2 diastolic dysfunction.   - Continue Plavix  - Continue Crestor  - Continue metoprolol succinate    changes as above re AF
LHC 6/23/23: 90% stenosis in distal LAD, DESx1 to distal LAD  TTE 6/2023: EF 50-55% with grade 2 diastolic dysfunction.   - Continue Plavix  - Continue Crestor  - Continue metoprolol tartrate    changes as above re AF
DVT ppx: lovenox subq  Diet: DASH, CC  Dispo: pending
DVT ppx: lovenox subq  Diet: DASH, CC  Dispo: pending
LHC 6/23/23: 90% stenosis in distal LAD, DESx1 to distal LAD  TTE 6/2023: EF 50-55% with grade 2 diastolic dysfunction.   - Continue Plavix  - Continue Crestor  - Continue metoprolol XL    changes as above re AF
LHC 6/23/23: 90% stenosis in distal LAD, DESx1 to distal LAD  TTE 6/2023: EF 50-55% with grade 2 diastolic dysfunction.   - Continue Plavix  - Continue Crestor  - Continue metoprolol tartrate    changes as above re AF
LHC 6/23/23: 90% stenosis in distal LAD, DESx1 to distal LAD  TTE 6/2023: EF 50-55% with grade 2 diastolic dysfunction.   - Continue Plavix  - Continue Crestor  - Continue metoprolol tartrate    changes as above re AF
DVT ppx: lovenox subq  Diet: DASH, CC  Dispo: pending
On treatment with Lutathera s/p first treatment on 10/20. Pt to get treatment every 2 months for total of 4 doses.   - CT with worsening liver mets.  - Manage symptoms supportively for now.
On treatment with Lutathera s/p first treatment on 10/20. Pt to get treatment every 2 months for total of 4 doses.   - CT with worsening liver mets.  - Manage symptoms supportively for now.
LHC 6/23/23: 90% stenosis in distal LAD, DESx1 to distal LAD  TTE 6/2023: EF 50-55% with grade 2 diastolic dysfunction.   - Continue Plavix  - Continue Crestor  - Continue metoprolol succinate    changes as above re AF

## 2023-11-03 NOTE — PROGRESS NOTE ADULT - ASSESSMENT
NANDO HERNANDEZ is a 78y Male who presents with a chief complaint of chest pain.    Metastatic Pancreatic Neuroendocrine Tumor  ·	Patient follows with Dr. Sophia Prasad, [facility].  ·	Recent progression of disease; last received Lutetium [id number] LUTATHERA on October 20th.  ·	No systemic therapy while inpatient or during rehabilitation    Anemia  ·	In the setting of inflammation, active malignancy  ·	Hemoglobin has been stable.  ·	Monitor and maintain HGB > 7    Chest Pain  Dyspnea  ·	Possibly due to abdominal pain which can occur with LUTATHERA  ·	Echocardiogram with no obvious abnormalities. EKG non-ischemic  ·	Diuretics on hold due to kidney injury.  ·	now resolved    Acute Kidney Injury  ·	In the setting of recent diuresis. Received IV fluids.  ·	Creatinine normalized    Atrial Fibrillation  ·	Patient is now on apixaban and clopidogrel.  ·	Also on rate control with metoprolol  ·	S/p TRACY/DCCV, maintaining SR. EP following.  ·	Grade 3 and 4 thrombocytopenia is rare with Lutathera (1%), and have been normal on 10/3 and 10/20. May continue eliquis.  ·	There is 5% risk of Afib with Lut[place]era, spoke to daughter about this and [place] [facility] [place]    [place]tic[place]ated discharge 11/4    Christen Rosales NP  Hematology/ Oncology  [facility]  [phone] (office)  [phone] (alt office)  Evenings and weekends please call MD on call or office

## 2023-11-03 NOTE — PROGRESS NOTE ADULT - SUBJECTIVE AND OBJECTIVE BOX
Patient is a 78y old  Male who presents with a chief complaint of Chest pain (03 Nov 2023 13:06)    Patient seen and examined.  Noted walking with PT.  anticipated discharge tomorrow, denies further episodes of chest pain     MEDICATIONS  (STANDING):  allopurinol 200 milliGRAM(s) Oral daily  apixaban 5 milliGRAM(s) Oral two times a day  atorvastatin 80 milliGRAM(s) Oral at bedtime  chlorhexidine 2% Cloths 1 Application(s) Topical daily  clopidogrel Tablet 75 milliGRAM(s) Oral daily  dextrose 5%. 1000 milliLiter(s) (100 mL/Hr) IV Continuous <Continuous>  dextrose 5%. 1000 milliLiter(s) (50 mL/Hr) IV Continuous <Continuous>  dextrose 50% Injectable 25 Gram(s) IV Push once  dextrose 50% Injectable 12.5 Gram(s) IV Push once  dextrose 50% Injectable 25 Gram(s) IV Push once  glucagon  Injectable 1 milliGRAM(s) IntraMuscular once  hydrALAZINE 10 milliGRAM(s) Oral three times a day  insulin lispro (ADMELOG) corrective regimen sliding scale   SubCutaneous at bedtime  insulin lispro (ADMELOG) corrective regimen sliding scale   SubCutaneous three times a day before meals  metoprolol succinate ER 25 milliGRAM(s) Oral daily  pantoprazole    Tablet 40 milliGRAM(s) Oral before breakfast  polyethylene glycol 3350 17 Gram(s) Oral daily  senna 2 Tablet(s) Oral at bedtime    MEDICATIONS  (PRN):  acetaminophen     Tablet .. 975 milliGRAM(s) Oral every 6 hours PRN Temp greater or equal to 38C (100.4F), Moderate Pain (4 - 6)  dextrose Oral Gel 15 Gram(s) Oral once PRN Blood Glucose LESS THAN 70 milliGRAM(s)/deciliter      Vital Signs Last 24 Hrs  T(C): 36.4 (03 Nov 2023 11:55), Max: 36.7 (02 Nov 2023 19:58)  T(F): 97.6 (03 Nov 2023 11:55), Max: 98.1 (02 Nov 2023 19:58)  HR: 64 (03 Nov 2023 11:55) (64 - 66)  BP: 105/64 (03 Nov 2023 11:55) (101/62 - 121/71)  BP(mean): --  RR: 18 (03 Nov 2023 11:55) (18 - 18)  SpO2: 98% (03 Nov 2023 11:55) (96% - 98%)    Parameters below as of 03 Nov 2023 11:55  Patient On (Oxygen Delivery Method): room air      PE  Awake, alert  Anicteric, MMM  on tele  CTAB  Abd soft, NT, ND  No c/c/e  No rash grossly  FROM                          9.9    8.58  )-----------( 404      ( 03 Nov 2023 06:38 )             31.3       11-03    138  |  107  |  36<H>  ----------------------------<  112<H>  4.0   |  18<L>  |  1.03    Ca    8.8      03 Nov 2023 06:39  Mg     1.9     11-03

## 2023-11-03 NOTE — PROGRESS NOTE ADULT - PROBLEM SELECTOR PROBLEM 7
Neuroendocrine cancer
Prophylactic measure
Prophylactic measure

## 2023-11-03 NOTE — PROGRESS NOTE ADULT - PROBLEM SELECTOR PROBLEM 6
CAD (coronary artery disease)
Prophylactic measure
CAD (coronary artery disease)
Neuroendocrine cancer
CAD (coronary artery disease)
Neuroendocrine cancer
Prophylactic measure
Prophylactic measure
CAD (coronary artery disease)

## 2023-11-03 NOTE — PROGRESS NOTE ADULT - PROBLEM SELECTOR PROBLEM 4
CAD (coronary artery disease)
CAD (coronary artery disease)
T2DM (type 2 diabetes mellitus)
RAZIA (acute kidney injury)
RAZIA (acute kidney injury)
T2DM (type 2 diabetes mellitus)
RAZIA (acute kidney injury)
CAD (coronary artery disease)
RAZIA (acute kidney injury)

## 2023-11-03 NOTE — PROGRESS NOTE ADULT - PROBLEM SELECTOR PLAN 1
w/ RVR  started on eliquis 5mg BID  s/p dccv with return of sinus  started on amiodarone load but after discussion with outpatient Cards, plan for Sotalol, initiating 11/1-11/3 per protocol, after 4ht dose 11/3 am qtc>500. sotalol stopped. Pending EP decision regarding alternative antiarrhythmic   metop 25mg XL daily  EP following

## 2023-11-03 NOTE — PROGRESS NOTE ADULT - SUBJECTIVE AND OBJECTIVE BOX
[facility] Division of Hospital Medicine  Armando Ann  MS Teams      SUBJECTIVE / OVERNIGHT EVENTS:  No events overnight  denies cp/sob/papitations  Mild ectopy on tele  s/p 4th dose of sotalol this am    ADDITIONAL REVIEW OF SYSTEMS:    MEDICATIONS  (STANDING):  allopurinol 200 milliGRAM(s) Oral daily  apixaban 5 milliGRAM(s) Oral two times a day  atorvastatin 80 milliGRAM(s) Oral at bedtime  chlorhexidine 2% Cloths 1 Application(s) Topical daily  clopidogrel Tablet 75 milliGRAM(s) Oral daily  dextrose 5%. 1000 milliLiter(s) (50 mL/Hr) IV Continuous <Continuous>  dextrose 5%. 1000 milliLiter(s) (100 mL/Hr) IV Continuous <Continuous>  dextrose 50% Injectable 25 Gram(s) IV Push once  dextrose 50% Injectable 12.5 Gram(s) IV Push once  dextrose 50% Injectable 25 Gram(s) IV Push once  glucagon  Injectable 1 milliGRAM(s) IntraMuscular once  hydrALAZINE 10 milliGRAM(s) Oral three times a day  insulin lispro (ADMELOG) corrective regimen sliding scale   SubCutaneous at bedtime  insulin lispro (ADMELOG) corrective regimen sliding scale   SubCutaneous three times a day before meals  metoprolol succinate ER 25 milliGRAM(s) Oral daily  pantoprazole    Tablet 40 milliGRAM(s) Oral before breakfast  polyethylene glycol 3350 17 Gram(s) Oral daily  senna 2 Tablet(s) Oral at bedtime    MEDICATIONS  (PRN):  acetaminophen     Tablet .. 975 milliGRAM(s) Oral every 6 hours PRN Temp greater or equal to 38C (100.4F), Moderate Pain (4 - 6)  dextrose Oral Gel 15 Gram(s) Oral once PRN Blood Glucose LESS THAN 70 milliGRAM(s)/deciliter      I&O's Summary    02 Nov 2023 07:01  -  03 Nov 2023 07:00  --------------------------------------------------------  IN: 660 mL / OUT: 1400 mL / NET: -740 mL    03 Nov 2023 07:01  -  03 Nov 2023 13:07  --------------------------------------------------------  IN: 380 mL / OUT: 0 mL / NET: 380 mL        PHYSICAL EXAM:  Vital Signs Last 24 Hrs  T(C): 36.4 (03 Nov 2023 11:55), Max: 36.7 (02 Nov 2023 19:58)  T(F): 97.6 (03 Nov 2023 11:55), Max: 98.1 (02 Nov 2023 19:58)  HR: 64 (03 Nov 2023 11:55) (64 - 66)  BP: 105/64 (03 Nov 2023 11:55) (101/62 - 121/71)  BP(mean): --  RR: 18 (03 Nov 2023 11:55) (18 - 18)  SpO2: 98% (03 Nov 2023 11:55) (96% - 98%)    Parameters below as of 03 Nov 2023 11:55  Patient On (Oxygen Delivery Method): room air      CONSTITUTIONAL: NAD, well-developed, well-groomed  EYES: PERRLA; conjunctiva and sclera clear  NECK: Supple, no palpable masses  RESPIRATORY: Normal respiratory effort; lungs are clear to auscultation bilaterally  CARDIOVASCULAR: rrr, normal S1 and S2, no murmur: 1+ LE edema  ABDOMEN: Nontender to palpation, normoactive bowel sounds, no rebound/guarding  MUSCULOSKELETAL:  no clubbing or cyanosis of digits; no joint swelling or tenderness to palpation  PSYCH: A+O to person, place, and time; affect appropriate  NEUROLOGY: CN 2-12 are intact and symmetric; no gross sensory deficits   SKIN: chronic venous stasis changes      LABS:                        9.9    8.58  )-----------( 404      ( 03 Nov 2023 06:38 )             31.3     11-03    138  |  107  |  36<H>  ----------------------------<  112<H>  4.0   |  18<L>  |  1.03    Ca    8.8      03 Nov 2023 06:39  Mg     1.9     11-03            Urinalysis Basic - ( 03 Nov 2023 06:39 )    Color: x / Appearance: x / SG: x / pH: x  Gluc: 112 mg/dL / Ketone: x  / Bili: x / Urobili: x   Blood: x / Protein: x / Nitrite: x   Leuk Esterase: x / RBC: x / WBC x   Sq Epi: x / Non Sq Epi: x / Bacteria: x        EKG QTC >500

## 2023-11-03 NOTE — CHART NOTE - NSCHARTNOTEFT_GEN_A_CORE
Nutrition Follow Up Note  Patient seen for: consult for sacral suspected deep tissue injury     Source: [x] Patient       [x] Medical Record        [] RN        [] Family at bedside       [x] Other: team during interdisciplinary rounds    -If unable to interview patient: [] Trach/Vent/BiPAP  [] Disoriented/confused/inappropriate to interview    Diet Order:   Diet, Regular:   Consistent Carbohydrate {No Snacks} (CSTCHO)  DASH/TLC {Sodium & Cholesterol Restricted} (DASH)  Supplement Feeding Modality:  Oral  Glucerna Shake Cans or Servings Per Day:  2       Frequency:  Daily (10-28-23)    - Is current order appropriate/adequate? [x] Yes  []  No:     - PO intake :   [x] >75%  Adequate    [] 50-75%  Fair       [] <50%  Poor    Nutrition-related concerns:  -on insulin regimen to maintain glycemic control. Hgb A1c 6.7%.     GI:  Last BM .  Bowel Regimen? [x] Yes   [] No    Weights:    pounds  Dosing weight 120.2kg  Daily Weight in k.4 (), Weight in k.9 (), Weight in k ()    Nutritionally Pertinent MEDICATIONS  (STANDING):  allopurinol  atorvastatin  dextrose 5%.  dextrose 5%.  dextrose 50% Injectable  dextrose 50% Injectable  dextrose 50% Injectable  glucagon  Injectable  hydrALAZINE  insulin lispro (ADMELOG) corrective regimen sliding scale  insulin lispro (ADMELOG) corrective regimen sliding scale  metoprolol succinate ER  pantoprazole    Tablet  polyethylene glycol 3350  senna  sotalol    Pertinent Labs:  @ 06:39: Na 138, BUN 36<H>, Cr 1.03, <H>, K+ 4.0, Phos --, Mg 1.9, Alk Phos --, ALT/SGPT --, AST/SGOT --, HbA1c --    A1C with Estimated Average Glucose Result: 6.7 % (10-25-23 @ 06:57)    Finger Sticks:  POCT Blood Glucose.: 107 mg/dL ( @ 07:46)  POCT Blood Glucose.: 136 mg/dL ( @ 21:45)  POCT Blood Glucose.: 189 mg/dL ( @ 16:41)  POCT Blood Glucose.: 123 mg/dL ( @ 11:10)    Skin per wound care note yesterday: Left buttock area with Deep tissue Injury noted.   Edema per nursing documentation: 1+ shaylee ankle 2+ shaylee leg    Estimated Needs:   [x] no change since previous assessment  [] recalculated:     Previous Nutrition Diagnosis: severe acute malnutrition, Increased Nutrient Needs  Nutrition Diagnosis is: [x] ongoing  [] resolved [] not applicable     Nutrition Care Plan:  [x] In Progress  [] Achieved  [] Not applicable    New Nutrition Diagnosis: [x] Not applicable    Nutrition Interventions:     Education Provided   [] Yes:  [] No:     Recommendations:          Monitoring and Evaluation:   Continue to monitor nutritional intake, tolerance to diet prescription, weights, labs, skin integrity    RD remains available upon request and will follow up per protocol  Kadi Negro MS, RD, CDN Nutrition Follow Up Note  Patient seen for: consult for sacral suspected deep tissue injury     Source: [x] Patient       [x] Medical Record        [] RN        [] Family at bedside       [x] Other: team during interdisciplinary rounds    -If unable to interview patient: [] Trach/Vent/BiPAP  [] Disoriented/confused/inappropriate to interview    Diet Order:   Diet, Regular:   Consistent Carbohydrate {No Snacks} (CSTCHO)  DASH/TLC {Sodium & Cholesterol Restricted} (DASH)  Supplement Feeding Modality:  Oral  Glucerna Shake Cans or Servings Per Day:  2       Frequency:  Daily (10-28-23)    - Is current order appropriate/adequate? [x] Yes  []  No:     - PO intake :   [x] >75%  Adequate    [] 50-75%  Fair       [] <50%  Poor    Nutrition-related concerns:  -on insulin regimen to maintain glycemic control. Hgb A1c 6.7%.     GI:  Last BM .  Bowel Regimen? [x] Yes   [] No    Weights:    pounds  Dosing weight 120.2kg  Daily Weight in k.4 (), Weight in k.9 (), Weight in k ()    Nutritionally Pertinent MEDICATIONS  (STANDING):  allopurinol  atorvastatin  dextrose 5%.  dextrose 5%.  dextrose 50% Injectable  dextrose 50% Injectable  dextrose 50% Injectable  glucagon  Injectable  hydrALAZINE  insulin lispro (ADMELOG) corrective regimen sliding scale  insulin lispro (ADMELOG) corrective regimen sliding scale  metoprolol succinate ER  pantoprazole    Tablet  polyethylene glycol 3350  senna  sotalol    Pertinent Labs:  @ 06:39: Na 138, BUN 36<H>, Cr 1.03, <H>, K+ 4.0, Phos --, Mg 1.9, Alk Phos --, ALT/SGPT --, AST/SGOT --, HbA1c --    A1C with Estimated Average Glucose Result: 6.7 % (10-25-23 @ 06:57)    Finger Sticks:  POCT Blood Glucose.: 107 mg/dL ( @ 07:46)  POCT Blood Glucose.: 136 mg/dL ( @ 21:45)  POCT Blood Glucose.: 189 mg/dL ( @ 16:41)  POCT Blood Glucose.: 123 mg/dL ( @ 11:10)    Skin per wound care note yesterday: Left buttock area with Deep tissue Injury noted.   Edema per nursing documentation: 1+ shaylee ankle 2+ shaylee leg    Estimated Needs:   [x] no change since previous assessment  [] recalculated:     Previous Nutrition Diagnosis: severe acute malnutrition, Increased Nutrient Needs  Nutrition Diagnosis is: [x] ongoing  [] resolved [] not applicable     Nutrition Care Plan:  [x] In Progress  [] Achieved  [] Not applicable    New Nutrition Diagnosis: [x] Not applicable    Nutrition Interventions:  PO intake encouraged at meals     Recommendations:      -Continue current diet  -Continue oral supplements to aid in healing   -Consider addition of Multivitamin if no contraindications to aid in wound healing     Monitoring and Evaluation:   Continue to monitor nutritional intake, tolerance to diet prescription, weights, labs, skin integrity    RD remains available upon request and will follow up per protocol  Kadi Negro, MS, RD, CDN / teams

## 2023-11-03 NOTE — PROGRESS NOTE ADULT - PROBLEM SELECTOR PLAN 7
DVT ppx: eliquis   Diet: DASH, CC,  Dispo: pending antiarrhythmic stabilization
On treatment with Lutathera s/p first treatment on 10/20. Pt to get treatment every 2 months for total of 4 doses.   - CT with worsening liver mets.  - Noted to have elevated LFTs on CMP. May be due to Lutathera which has been shown to cause hepatoxicity. Uptrending today  - Manage symptoms supportively for now.    as above f/u onc
On treatment with Lutathera s/p first treatment on 10/20. Pt to get treatment every 2 months for total of 4 doses.   - CT with worsening liver mets.  - Manage symptoms supportively for now.    as above f/u onc
On treatment with Lutathera s/p first treatment on 10/20. Pt to get treatment every 2 months for total of 4 doses.   - CT with worsening liver mets.  - Manage symptoms supportively for now.    as above f/u onc
On treatment with Lutathera s/p first treatment on 10/20. Pt to get treatment every 2 months for total of 4 doses.   - CT with worsening liver mets.  - Noted to have elevated LFTs on CMP. May be due to Lutathera which has been shown to cause hepatoxicity.  - Manage symptoms supportively for now.    as above f/u onc
DVT ppx: eliquis   Diet: DASH, CC,  Dispo: pending sotalol initiation
On treatment with Lutathera s/p first treatment on 10/20. Pt to get treatment every 2 months for total of 4 doses.   - CT with worsening liver mets.  - Manage symptoms supportively for now.    as above f/u onc
On treatment with Lutathera s/p first treatment on 10/20. Pt to get treatment every 2 months for total of 4 doses.   - CT with worsening liver mets.  - Noted to have elevated LFTs on CMP. May be due to Lutathera which has been shown to cause hepatoxicity. Uptrending today  - Manage symptoms supportively for now.    as above f/u onc

## 2023-11-03 NOTE — PROGRESS NOTE ADULT - ASSESSMENT
79 y/o M with hx of CAD s/p NURIA to distal LAD in 6/2023, HTN, HLD, prior EtOH disorder (denies current use), T2DM, Scoliosis, spinal surgeries, Metastatic Pancreatic neuroendocrine tumor on [place]utathera [place] [facility] [place] with fatigue and chest pain. EP consulted for new onset AFib started on 10/26/23 in-patient with RVR. Now s/p successful TRACY/DCCV on 10/31/23 to restore sinus rhythm. The patient's LFTs were reportedly normal in September. He has had mild transaminitis this admission. Therefore AAD changed from Amiodarone to Sotalol given mild transaminitis.     1. new onset AF w/ RVR, now s/p TRACY DCCV 10/31   2. Metastatic pancreatic neuroendocrine tumor  3. Mild transaminitis     - Maintaining SR post DCCV, started on Sotalol 80mg bid. This AM, after Sotalol dose QTC prolonged to 518ms  (from 451ms after 2nd dose). Will discontinue and discuss with attending re: AAD   - Continue Eliquis 5mg PO BID without interruption  - Continue Metoprolol succinate 25mg QD  - Supplement to ensure K>4.0, Mg >2.0  - Continue tele monitoring  - EP will continue to follow        77 y/o M with hx of CAD s/p NURIA to distal LAD in 6/2023, HTN, HLD, prior EtOH disorder (denies current use), T2DM, Scoliosis, spinal surgeries, Metastatic Pancreatic neuroendocrine tumor on Lutathera (first treatment on 10/20/23 at [facility] [place] presenting with fatigue and chest pain. EP consulted for new onset AFib started on 10/26/23 in-patient with RVR. Now s/p successful TRACY/DCCV on 10/31/23 to restore sinus rhythm. The patient's LFTs were reportedly normal in September. He has had mild transaminitis this admission. Therefore AAD changed from Amiodarone to Sotalol given mild transaminitis.     1. new onset AF w/ RVR, now s/p TRACY DCCV 10/31   2. Metastatic pancreatic neuroendocrine tumor  3. Mild transaminitis     - Maintaining SR post DCCV, started on Sotalol 80mg bid. This AM, after Sotalol dose QTC 480ms (manually calculated by ECG at HR 60bpm, ECG revealing atrial bigem)  (from 451ms after 2nd dose). Continue  - Continue Eliquis 5mg PO BID without interruption  - Continue Metoprolol succinate 25mg QD  - Supplement to ensure K>4.0, Mg >2.0  - Continue tele monitoring  - EP will continue to follow        77 y/o M with hx of CAD s/p NURIA to distal LAD in 6/2023, HTN, HLD, prior EtOH disorder (denies current use), T2DM, Scoliosis, spinal surgeries, Metastatic Pancreatic neuroendocrine tumor on [place]utathera [place] [facility] [place] with fatigue and chest pain. EP consulted for new onset AFib started on 10/26/23 in-patient with RVR. Now s/p successful TRACY/DCCV on 10/31/23 to restore sinus rhythm. The patient's LFTs were reportedly normal in September. He has had mild transaminitis this admission. Therefore AAD changed from Amiodarone to Sotalol given mild transaminitis.     1. new onset AF w/ RVR, now s/p TRACY DCCV 10/31   2. Metastatic pancreatic neuroendocrine tumor  3. Mild transaminitis     - Maintaining SR post DCCV, started on Sotalol 80mg bid. This AM, after Sotalol dose QTC 480ms (manually calculated by ECG at HR 60bpm, ECG revealing atrial bigem). Continue at current dosing, hold for QTC >500  - Continue Eliquis 5mg PO BID without interruption  - Continue Metoprolol succinate 25mg QD  - Supplement to ensure K>4.0, Mg >2.0  - Continue tele monitoring  - EP will continue to follow

## 2023-11-03 NOTE — PROGRESS NOTE ADULT - SUBJECTIVE AND OBJECTIVE BOX
DATE OF SERVICE: 11-03-23 @ 20:36    Patient is a 78y old  Male who presents with a chief complaint of Chest pain (03 Nov 2023 13:37)      INTERVAL HISTORY: feels ok    TELEMETRY Personally reviewed: no events  	  MEDICATIONS:  hydrALAZINE 10 milliGRAM(s) Oral three times a day  metoprolol succinate ER 25 milliGRAM(s) Oral daily  sotalol 80 milliGRAM(s) Oral every 12 hours        PHYSICAL EXAM:  T(C): 36.6 (11-03-23 @ 19:00), Max: 36.6 (11-03-23 @ 19:00)  HR: 70 (11-03-23 @ 19:00) (64 - 70)  BP: 118/69 (11-03-23 @ 19:00) (105/64 - 121/71)  RR: 18 (11-03-23 @ 19:00) (18 - 18)  SpO2: 97% (11-03-23 @ 19:00) (96% - 98%)  Wt(kg): --  I&O's Summary    02 Nov 2023 07:01  -  03 Nov 2023 07:00  --------------------------------------------------------  IN: 660 mL / OUT: 1400 mL / NET: -740 mL    03 Nov 2023 07:01  -  03 Nov 2023 20:36  --------------------------------------------------------  IN: 380 mL / OUT: 100 mL / NET: 280 mL          Appearance: In no distress	  HEENT:    PERRL, EOMI	  Cardiovascular:  S1 S2, No JVD  Respiratory: Lungs clear to auscultation	  Gastrointestinal:  Soft, Non-tender, + BS	  Vascularature:  No edema of LE  Psychiatric: Appropriate affect   Neuro: no acute focal deficits                               9.9    8.58  )-----------( 404      ( 03 Nov 2023 06:38 )             31.3     11-03    138  |  107  |  36<H>  ----------------------------<  112<H>  4.0   |  18<L>  |  1.03    Ca    8.8      03 Nov 2023 06:39  Mg     1.9     11-03          Labs personally reviewed      ASSESSMENT/PLAN: 	  78 M with hx of CAD s/p NURIA to distal LAD in 6/2023, HTN, HLD, EtOH disorder, T2DM, spinal surgeries, neuroendocrine tumor on Lutathera (first treatment on Friday 10/20 at [facility] [place]), presenting with fatigue and chest pain since Friday. Patient reports severe fatigue starting Friday after his first treatment with Lutathera. Pt also reported mid-left lower chest pain described as pressure. No radiation, no aggravating or alleviating factors. Pt has never felt this type of chest pain before. Denies palpitations, diaphoresis, dyspnea, nausea, vomiting. Pt has diffuse abdominal pain. Also with constipation; last BM several days ago. No other acute complaints.      Problem/Plan - 1:  ·  Problem: Chest Pain improving   ·  Plan: Location described as LUQ abdomen with pain elicited with palpation, appears noncardiac   - EKG SR with PACs, no ischemic characteristics  - Trop 72-->83--> 81/ CKMB 1.9  - 10/26 reports CP, trop increased to 110 --> 133 likely 2/2 demand i/s/o ST and fever. Trend to peak.   - Mildly elevated LFTS and Lipase noted  - Hx of recent PCI  - Repeat TTE unchanged   - CP possibly 2/2 lutathera with known S/E of abdominal discomfort     Problem/Plan - 2:  ·  Problem: Shortness of Breath  ·  Plan: ECG non-ischemic  - TTE wnl.  - BNP 2572. Repeat 4171.  - CT neg for PE, no pulm edema noted  - s/p Lasix 20 IVP once. Will hold further diuretics given RAZIA.   - Cr now wnl. Will slowly re-implement home diuretics.      Problem/Plan - 3:  ·  Problem: CAD.   ·  Plan: Recent PCI to pLAD in June 2023  - ECG non-ischemic  - Trop/CKMB as noted above  - c/w Plavix and statin, d/c ASA given starting Eliquis  - TTE unchanged     Problem/Plan - 4:  ·  Problem: RAZIA - resolved Cr wnl   ·  Plan: c/w hydration and monitor kidney functions      Problem/Plan - 5:  ·  Problem: New Onset Atrial Fibrillation  - As per tele, converted to rapid AF rates 120-140bpm  - TSH wnl  - s/p TRACY without thrombus, s/p DCCV with return of sinus  - [id number] of 2. Cont Eliquis 5mg BID  - Cont Toprol 25mg PO daily  - C/w of Sotalol as per EP--> monitor qTC           Hank Hayes DO [facility]  Cardiovascular Medicine  [address] [place] [address], [address]  Office: [phone]  Available via Text/call on [employer] Teams

## 2023-11-03 NOTE — PROGRESS NOTE ADULT - ASSESSMENT
78 M with hx of CAD s/p NURIA to distal LAD in 6/2023, HTN, HLD, EtOH disorder, T2DM, spinal surgeries, neuroendocrine tumor on Lutathera (first [place] [facility] [place] presenting with fatigue and chest pain, fever + SIRS criteria. Concern for reaction to Lutathera vs progression of disease given start of symptoms. C/B afib with RVR, s/p DCCV. Initially on amiodarone but switched to sotalol given transaminitis.

## 2023-11-03 NOTE — PROGRESS NOTE ADULT - PROBLEM SELECTOR PROBLEM 2
Chronic stable angina
Chemotherapy adverse reaction
Chronic stable angina
Chemotherapy adverse reaction
Chemotherapy adverse reaction
Neuroendocrine cancer
Chemotherapy adverse reaction
Chronic stable angina
Chemotherapy adverse reaction

## 2023-11-03 NOTE — PROGRESS NOTE ADULT - PROBLEM SELECTOR PLAN 3
Hx of chronic stable angina Chest pain atypical but patient with know cardiac risk factors and has prior history of CAD with stent placement. Current pain is different from prior chest pains.   elevated troponin which continues to increase  cards appreciated - doubt cardiac chest pain, think more likely from liver mets or treatment reaction as above  TTE reviewed no WMA
Hx of chronic stable angina Chest pain atypical but patient with know cardiac risk factors and has prior history of CAD with stent placement. Current pain is different from prior chest pains.   elevated troponin which continues to increase  cards appreciated - doubt cardiac chest pain, think more likely from liver mets or treatment reaction as above  TTE reviewed no WMA  resolved
- Patient is aware of both opioid and non opioid options available to treat his pain if needed however given that patient requests to manage his pain without medications at this time, the Palliative Care team will sign off for now  - Happy to revisit patient if he would like to discuss pain management options this admission    [phone]
On Metformin 1000mg BID. A1c 7.5 in 6/2023  - Start low insulin sliding scale  - FS before meals and at bedtime.   - CC diet
was already increasing but worsened after torsemide started  now d/c'ed by cards  Labs evaluated with worsening Cr and transaminases concerning for low volume state, discussed with Dr. Hayes and s/p gentle hydration with LR @75 for 10 hours on 10/28/23 with improvement in creatinine; appears relatively euvolemic today. will restart diuretics slowly with cards assistance
On Metformin 1000mg BID. A1c 7.5 in 6/2023  - Start low insulin sliding scale  - FS before meals and at bedtime.   - CC diet
was already increasing but worsened after torsemide started  now d/c'ed by cards  Labs evaluated with worsening Cr and transaminases concerning for low volume state, discussed with Dr. Hayes and s/p gentle hydration with LR @75 for 10 hours on 10/28/23 with improvement in creatinine; appears relatively euvolemic today. will restart diuretics slowly with cards assistance
On Metformin 1000mg BID. A1c 7.5 in 6/2023  - Start low insulin sliding scale  - FS before meals and at bedtime.   - CC diet
Hx of chronic stable angina Chest pain atypical but patient with know cardiac risk factors and has prior history of CAD with stent placement. Current pain is different from prior chest pains.   elevated troponin which continues to increase  cards appreciated - doubt cardiac chest pain, think more likely from liver mets or treatment reaction as above  TTE reviewed no WMA  resolved
Hx of chronic stable angina Chest pain atypical but patient with know cardiac risk factors and has prior history of CAD with stent placement. Current pain is different from prior chest pains.   elevated troponin which continues to increase  cards appreciated - doubt cardiac chest pain, think more likely from liver mets or treatment reaction as above  TTE reviewed no WMA

## 2023-11-04 ENCOUNTER — TRANSCRIPTION ENCOUNTER (OUTPATIENT)
Age: 78
End: 2023-11-04

## 2023-11-04 VITALS
RESPIRATION RATE: 18 BRPM | SYSTOLIC BLOOD PRESSURE: 103 MMHG | TEMPERATURE: 98 F | DIASTOLIC BLOOD PRESSURE: 60 MMHG | OXYGEN SATURATION: 98 % | HEART RATE: 64 BPM

## 2023-11-04 LAB
ALBUMIN SERPL ELPH-MCNC: 2.6 G/DL — LOW (ref 3.3–5)
ALBUMIN SERPL ELPH-MCNC: 2.6 G/DL — LOW (ref 3.3–5)
ALP SERPL-CCNC: 226 U/L — HIGH (ref 40–120)
ALP SERPL-CCNC: 226 U/L — HIGH (ref 40–120)
ALT FLD-CCNC: 75 U/L — HIGH (ref 10–45)
ALT FLD-CCNC: 75 U/L — HIGH (ref 10–45)
ANION GAP SERPL CALC-SCNC: 12 MMOL/L — SIGNIFICANT CHANGE UP (ref 5–17)
ANION GAP SERPL CALC-SCNC: 12 MMOL/L — SIGNIFICANT CHANGE UP (ref 5–17)
AST SERPL-CCNC: 66 U/L — HIGH (ref 10–40)
AST SERPL-CCNC: 66 U/L — HIGH (ref 10–40)
BILIRUB SERPL-MCNC: 0.3 MG/DL — SIGNIFICANT CHANGE UP (ref 0.2–1.2)
BILIRUB SERPL-MCNC: 0.3 MG/DL — SIGNIFICANT CHANGE UP (ref 0.2–1.2)
BUN SERPL-MCNC: 34 MG/DL — HIGH (ref 7–23)
BUN SERPL-MCNC: 34 MG/DL — HIGH (ref 7–23)
CALCIUM SERPL-MCNC: 8.9 MG/DL — SIGNIFICANT CHANGE UP (ref 8.4–10.5)
CALCIUM SERPL-MCNC: 8.9 MG/DL — SIGNIFICANT CHANGE UP (ref 8.4–10.5)
CHLORIDE SERPL-SCNC: 108 MMOL/L — SIGNIFICANT CHANGE UP (ref 96–108)
CHLORIDE SERPL-SCNC: 108 MMOL/L — SIGNIFICANT CHANGE UP (ref 96–108)
CO2 SERPL-SCNC: 20 MMOL/L — LOW (ref 22–31)
CO2 SERPL-SCNC: 20 MMOL/L — LOW (ref 22–31)
CREAT SERPL-MCNC: 1.13 MG/DL — SIGNIFICANT CHANGE UP (ref 0.5–1.3)
CREAT SERPL-MCNC: 1.13 MG/DL — SIGNIFICANT CHANGE UP (ref 0.5–1.3)
EGFR: 67 ML/MIN/1.73M2 — SIGNIFICANT CHANGE UP
EGFR: 67 ML/MIN/1.73M2 — SIGNIFICANT CHANGE UP
GLUCOSE BLDC GLUCOMTR-MCNC: 125 MG/DL — HIGH (ref 70–99)
GLUCOSE BLDC GLUCOMTR-MCNC: 125 MG/DL — HIGH (ref 70–99)
GLUCOSE BLDC GLUCOMTR-MCNC: 95 MG/DL — SIGNIFICANT CHANGE UP (ref 70–99)
GLUCOSE BLDC GLUCOMTR-MCNC: 95 MG/DL — SIGNIFICANT CHANGE UP (ref 70–99)
GLUCOSE SERPL-MCNC: 101 MG/DL — HIGH (ref 70–99)
GLUCOSE SERPL-MCNC: 101 MG/DL — HIGH (ref 70–99)
HCT VFR BLD CALC: 30.9 % — LOW (ref 39–50)
HCT VFR BLD CALC: 30.9 % — LOW (ref 39–50)
HGB BLD-MCNC: 9.7 G/DL — LOW (ref 13–17)
HGB BLD-MCNC: 9.7 G/DL — LOW (ref 13–17)
MAGNESIUM SERPL-MCNC: 1.7 MG/DL — SIGNIFICANT CHANGE UP (ref 1.6–2.6)
MAGNESIUM SERPL-MCNC: 1.7 MG/DL — SIGNIFICANT CHANGE UP (ref 1.6–2.6)
MCHC RBC-ENTMCNC: 29.7 PG — SIGNIFICANT CHANGE UP (ref 27–34)
MCHC RBC-ENTMCNC: 29.7 PG — SIGNIFICANT CHANGE UP (ref 27–34)
MCHC RBC-ENTMCNC: 31.4 GM/DL — LOW (ref 32–36)
MCHC RBC-ENTMCNC: 31.4 GM/DL — LOW (ref 32–36)
MCV RBC AUTO: 94.5 FL — SIGNIFICANT CHANGE UP (ref 80–100)
MCV RBC AUTO: 94.5 FL — SIGNIFICANT CHANGE UP (ref 80–100)
NRBC # BLD: 0 /100 WBCS — SIGNIFICANT CHANGE UP (ref 0–0)
NRBC # BLD: 0 /100 WBCS — SIGNIFICANT CHANGE UP (ref 0–0)
PLATELET # BLD AUTO: 401 K/UL — HIGH (ref 150–400)
PLATELET # BLD AUTO: 401 K/UL — HIGH (ref 150–400)
POTASSIUM SERPL-MCNC: 4.2 MMOL/L — SIGNIFICANT CHANGE UP (ref 3.5–5.3)
POTASSIUM SERPL-MCNC: 4.2 MMOL/L — SIGNIFICANT CHANGE UP (ref 3.5–5.3)
POTASSIUM SERPL-SCNC: 4.2 MMOL/L — SIGNIFICANT CHANGE UP (ref 3.5–5.3)
POTASSIUM SERPL-SCNC: 4.2 MMOL/L — SIGNIFICANT CHANGE UP (ref 3.5–5.3)
PROT SERPL-MCNC: 5.7 G/DL — LOW (ref 6–8.3)
PROT SERPL-MCNC: 5.7 G/DL — LOW (ref 6–8.3)
RBC # BLD: 3.27 M/UL — LOW (ref 4.2–5.8)
RBC # BLD: 3.27 M/UL — LOW (ref 4.2–5.8)
RBC # FLD: 14.7 % — HIGH (ref 10.3–14.5)
RBC # FLD: 14.7 % — HIGH (ref 10.3–14.5)
SODIUM SERPL-SCNC: 140 MMOL/L — SIGNIFICANT CHANGE UP (ref 135–145)
SODIUM SERPL-SCNC: 140 MMOL/L — SIGNIFICANT CHANGE UP (ref 135–145)
WBC # BLD: 9.06 K/UL — SIGNIFICANT CHANGE UP (ref 3.8–10.5)
WBC # BLD: 9.06 K/UL — SIGNIFICANT CHANGE UP (ref 3.8–10.5)
WBC # FLD AUTO: 9.06 K/UL — SIGNIFICANT CHANGE UP (ref 3.8–10.5)
WBC # FLD AUTO: 9.06 K/UL — SIGNIFICANT CHANGE UP (ref 3.8–10.5)

## 2023-11-04 PROCEDURE — 84443 ASSAY THYROID STIM HORMONE: CPT

## 2023-11-04 PROCEDURE — 96365 THER/PROPH/DIAG IV INF INIT: CPT

## 2023-11-04 PROCEDURE — 85018 HEMOGLOBIN: CPT

## 2023-11-04 PROCEDURE — 96361 HYDRATE IV INFUSION ADD-ON: CPT

## 2023-11-04 PROCEDURE — 85027 COMPLETE CBC AUTOMATED: CPT

## 2023-11-04 PROCEDURE — 85610 PROTHROMBIN TIME: CPT

## 2023-11-04 PROCEDURE — 82550 ASSAY OF CK (CPK): CPT

## 2023-11-04 PROCEDURE — 82947 ASSAY GLUCOSE BLOOD QUANT: CPT

## 2023-11-04 PROCEDURE — 96366 THER/PROPH/DIAG IV INF ADDON: CPT

## 2023-11-04 PROCEDURE — 84100 ASSAY OF PHOSPHORUS: CPT

## 2023-11-04 PROCEDURE — 80053 COMPREHEN METABOLIC PANEL: CPT

## 2023-11-04 PROCEDURE — 99285 EMERGENCY DEPT VISIT HI MDM: CPT | Mod: 25

## 2023-11-04 PROCEDURE — C8929: CPT

## 2023-11-04 PROCEDURE — 0225U NFCT DS DNA&RNA 21 SARSCOV2: CPT

## 2023-11-04 PROCEDURE — 82330 ASSAY OF CALCIUM: CPT

## 2023-11-04 PROCEDURE — 83880 ASSAY OF NATRIURETIC PEPTIDE: CPT

## 2023-11-04 PROCEDURE — 93970 EXTREMITY STUDY: CPT

## 2023-11-04 PROCEDURE — 82803 BLOOD GASES ANY COMBINATION: CPT

## 2023-11-04 PROCEDURE — 84295 ASSAY OF SERUM SODIUM: CPT

## 2023-11-04 PROCEDURE — 82435 ASSAY OF BLOOD CHLORIDE: CPT

## 2023-11-04 PROCEDURE — 84156 ASSAY OF PROTEIN URINE: CPT

## 2023-11-04 PROCEDURE — 74177 CT ABD & PELVIS W/CONTRAST: CPT | Mod: MA

## 2023-11-04 PROCEDURE — 99233 SBSQ HOSP IP/OBS HIGH 50: CPT | Mod: FS

## 2023-11-04 PROCEDURE — 87637 SARSCOV2&INF A&B&RSV AMP PRB: CPT

## 2023-11-04 PROCEDURE — 83036 HEMOGLOBIN GLYCOSYLATED A1C: CPT

## 2023-11-04 PROCEDURE — 82962 GLUCOSE BLOOD TEST: CPT

## 2023-11-04 PROCEDURE — 80048 BASIC METABOLIC PNL TOTAL CA: CPT

## 2023-11-04 PROCEDURE — 93312 ECHO TRANSESOPHAGEAL: CPT

## 2023-11-04 PROCEDURE — 87040 BLOOD CULTURE FOR BACTERIA: CPT

## 2023-11-04 PROCEDURE — 99239 HOSP IP/OBS DSCHRG MGMT >30: CPT

## 2023-11-04 PROCEDURE — 71275 CT ANGIOGRAPHY CHEST: CPT | Mod: MA

## 2023-11-04 PROCEDURE — 82553 CREATINE MB FRACTION: CPT

## 2023-11-04 PROCEDURE — 84300 ASSAY OF URINE SODIUM: CPT

## 2023-11-04 PROCEDURE — 97161 PT EVAL LOW COMPLEX 20 MIN: CPT

## 2023-11-04 PROCEDURE — 87086 URINE CULTURE/COLONY COUNT: CPT

## 2023-11-04 PROCEDURE — 81001 URINALYSIS AUTO W/SCOPE: CPT

## 2023-11-04 PROCEDURE — 93005 ELECTROCARDIOGRAM TRACING: CPT

## 2023-11-04 PROCEDURE — 85014 HEMATOCRIT: CPT

## 2023-11-04 PROCEDURE — 82570 ASSAY OF URINE CREATININE: CPT

## 2023-11-04 PROCEDURE — 84132 ASSAY OF SERUM POTASSIUM: CPT

## 2023-11-04 PROCEDURE — 85025 COMPLETE CBC W/AUTO DIFF WBC: CPT

## 2023-11-04 PROCEDURE — 96375 TX/PRO/DX INJ NEW DRUG ADDON: CPT

## 2023-11-04 PROCEDURE — 84540 ASSAY OF URINE/UREA-N: CPT

## 2023-11-04 PROCEDURE — 92960 CARDIOVERSION ELECTRIC EXT: CPT

## 2023-11-04 PROCEDURE — 93010 ELECTROCARDIOGRAM REPORT: CPT

## 2023-11-04 PROCEDURE — 84484 ASSAY OF TROPONIN QUANT: CPT

## 2023-11-04 PROCEDURE — 71045 X-RAY EXAM CHEST 1 VIEW: CPT

## 2023-11-04 PROCEDURE — 83735 ASSAY OF MAGNESIUM: CPT

## 2023-11-04 PROCEDURE — 85730 THROMBOPLASTIN TIME PARTIAL: CPT

## 2023-11-04 PROCEDURE — 76377 3D RENDER W/INTRP POSTPROCES: CPT

## 2023-11-04 PROCEDURE — 83690 ASSAY OF LIPASE: CPT

## 2023-11-04 PROCEDURE — 93325 DOPPLER ECHO COLOR FLOW MAPG: CPT

## 2023-11-04 PROCEDURE — 93320 DOPPLER ECHO COMPLETE: CPT

## 2023-11-04 PROCEDURE — 83605 ASSAY OF LACTIC ACID: CPT

## 2023-11-04 PROCEDURE — 96367 TX/PROPH/DG ADDL SEQ IV INF: CPT

## 2023-11-04 PROCEDURE — 36415 COLL VENOUS BLD VENIPUNCTURE: CPT

## 2023-11-04 RX ORDER — ROSUVASTATIN CALCIUM 5 MG/1
1 TABLET ORAL
Qty: 0 | Refills: 0 | DISCHARGE

## 2023-11-04 RX ORDER — CLOPIDOGREL BISULFATE 75 MG/1
1 TABLET, FILM COATED ORAL
Qty: 30 | Refills: 0
Start: 2023-11-04 | End: 2023-12-03

## 2023-11-04 RX ORDER — PANTOPRAZOLE SODIUM 20 MG/1
1 TABLET, DELAYED RELEASE ORAL
Refills: 0 | DISCHARGE

## 2023-11-04 RX ORDER — ALLOPURINOL 300 MG
2 TABLET ORAL
Qty: 60 | Refills: 0
Start: 2023-11-04 | End: 2023-12-03

## 2023-11-04 RX ORDER — POTASSIUM CHLORIDE 20 MEQ
1 PACKET (EA) ORAL
Qty: 0 | Refills: 0 | DISCHARGE

## 2023-11-04 RX ORDER — METOPROLOL TARTRATE 50 MG
1 TABLET ORAL
Qty: 30 | Refills: 0
Start: 2023-11-04 | End: 2023-12-03

## 2023-11-04 RX ORDER — METFORMIN HYDROCHLORIDE 850 MG/1
1 TABLET ORAL
Qty: 60 | Refills: 0
Start: 2023-11-04 | End: 2023-12-03

## 2023-11-04 RX ORDER — METFORMIN HYDROCHLORIDE 850 MG/1
1 TABLET ORAL
Refills: 0 | DISCHARGE

## 2023-11-04 RX ORDER — SPIRONOLACTONE 25 MG/1
1 TABLET, FILM COATED ORAL
Refills: 0 | DISCHARGE

## 2023-11-04 RX ORDER — APIXABAN 2.5 MG/1
1 TABLET, FILM COATED ORAL
Qty: 60 | Refills: 0
Start: 2023-11-04 | End: 2023-12-03

## 2023-11-04 RX ORDER — ROSUVASTATIN CALCIUM 5 MG/1
1 TABLET ORAL
Qty: 30 | Refills: 0
Start: 2023-11-04 | End: 2023-12-03

## 2023-11-04 RX ORDER — PANTOPRAZOLE SODIUM 20 MG/1
1 TABLET, DELAYED RELEASE ORAL
Qty: 30 | Refills: 0
Start: 2023-11-04 | End: 2023-12-03

## 2023-11-04 RX ORDER — ASPIRIN/CALCIUM CARB/MAGNESIUM 324 MG
1 TABLET ORAL
Qty: 30 | Refills: 0
Start: 2023-11-04 | End: 2023-12-03

## 2023-11-04 RX ORDER — SOTALOL HCL 120 MG
1 TABLET ORAL
Qty: 60 | Refills: 0
Start: 2023-11-04 | End: 2023-12-03

## 2023-11-04 RX ORDER — METOPROLOL TARTRATE 50 MG
1 TABLET ORAL
Qty: 0 | Refills: 0 | DISCHARGE

## 2023-11-04 RX ORDER — ALLOPURINOL 300 MG
2 TABLET ORAL
Refills: 0 | DISCHARGE

## 2023-11-04 RX ORDER — SPIRONOLACTONE 25 MG/1
1 TABLET, FILM COATED ORAL
Qty: 60 | Refills: 0
Start: 2023-11-04 | End: 2023-12-03

## 2023-11-04 RX ADMIN — PANTOPRAZOLE SODIUM 40 MILLIGRAM(S): 20 TABLET, DELAYED RELEASE ORAL at 05:45

## 2023-11-04 RX ADMIN — APIXABAN 5 MILLIGRAM(S): 2.5 TABLET, FILM COATED ORAL at 05:46

## 2023-11-04 RX ADMIN — Medication 25 MILLIGRAM(S): at 05:45

## 2023-11-04 RX ADMIN — CLOPIDOGREL BISULFATE 75 MILLIGRAM(S): 75 TABLET, FILM COATED ORAL at 11:37

## 2023-11-04 RX ADMIN — Medication 80 MILLIGRAM(S): at 05:46

## 2023-11-04 RX ADMIN — Medication 10 MILLIGRAM(S): at 05:45

## 2023-11-04 RX ADMIN — Medication 200 MILLIGRAM(S): at 11:37

## 2023-11-04 NOTE — PROGRESS NOTE ADULT - SUBJECTIVE AND OBJECTIVE BOX
DATE OF SERVICE: 11-04-23 @ 08:30    Patient is a 78y old  Male who presents with a chief complaint of Chest pain (03 Nov 2023 17:35)      INTERVAL HISTORY:     REVIEW OF SYSTEMS:  CONSTITUTIONAL: No weakness  EYES/ENT: No visual changes;  No throat pain   NECK: No pain or stiffness  RESPIRATORY: No cough, wheezing; No shortness of breath  CARDIOVASCULAR: No chest pain or palpitations  GASTROINTESTINAL: No abdominal  pain. No nausea, vomiting, or hematemesis  GENITOURINARY: No dysuria, frequency or hematuria  NEUROLOGICAL: No stroke like symptoms  SKIN: No rashes    TELEMETRY Personally reviewed:  	  MEDICATIONS:  hydrALAZINE 10 milliGRAM(s) Oral three times a day  metoprolol succinate ER 25 milliGRAM(s) Oral daily  sotalol 80 milliGRAM(s) Oral every 12 hours        PHYSICAL EXAM:  T(C): 36.6 (11-04-23 @ 04:40), Max: 36.6 (11-03-23 @ 19:00)  HR: 69 (11-04-23 @ 04:40) (64 - 70)  BP: 122/67 (11-04-23 @ 04:40) (105/64 - 122/67)  RR: 18 (11-04-23 @ 04:40) (18 - 18)  SpO2: 97% (11-04-23 @ 04:40) (97% - 98%)  Wt(kg): --  I&O's Summary    03 Nov 2023 07:01  -  04 Nov 2023 07:00  --------------------------------------------------------  IN: 380 mL / OUT: 950 mL / NET: -570 mL          Appearance: In no distress	  HEENT:    PERRL, EOMI	  Cardiovascular:  S1 S2, No JVD  Respiratory: Lungs clear to auscultation	  Gastrointestinal:  Soft, Non-tender, + BS	  Vascularature:  No edema of LE  Psychiatric: Appropriate affect   Neuro: no acute focal deficits                               9.7    9.06  )-----------( 401      ( 04 Nov 2023 06:17 )             30.9     11-04    140  |  108  |  34<H>  ----------------------------<  101<H>  4.2   |  20<L>  |  1.13    Ca    8.9      04 Nov 2023 06:18  Mg     1.7     11-04    TPro  5.7<L>  /  Alb  2.6<L>  /  TBili  0.3  /  DBili  x   /  AST  66<H>  /  ALT  75<H>  /  AlkPhos  226<H>  11-04    ECG 11/2/23  NORMAL SINUS RHYTHM  LOW VOLTAGE QRS  SEPTAL INFARCT (CITED ON OR BEFORE 01-NOV-2023)  ABNORMAL ECG  WHEN COMPARED WITH ECG OF 02-NOV-2023 05:02, (UNCONFIRMED)  NO SIGNIFICANT CHANGE WAS FOUND    Labs personally reviewed      ASSESSMENT/PLAN: 	  78 M with hx of CAD s/p NURIA to distal LAD in 6/2023, HTN, HLD, EtOH disorder, T2DM, spinal surgeries, neuroendocrine tumor on Lutathera (first treatment on Friday 10/20 at [facility] [place] presenting with fatigue and chest pain since Friday.        Problem/Plan - 1:  ·  Problem: Chest Pain improving   ·  Plan: Location described as LUQ abdomen with pain elicited with palpation, appears noncardiac   - EKG SR with PACs, no ischemic characteristics  - Trop 72-->83--> 81/ CKMB 1.9  - 10/26 reports CP, trop increased to 110 --> 133 likely 2/2 demand i/s/o ST and fever. Trend to peak.   - Mildly elevated LFTS and Lipase noted  - Hx of recent PCI  - Repeat TTE unchanged   - CP possibly 2/2 lutathera with known S/E of abdominal discomfort     Problem/Plan - 2:  ·  Problem: Shortness of Breath  ·  Plan: ECG non-ischemic  - TTE wnl.  - BNP 2572. Repeat 4171.  - CT neg for PE, no pulm edema noted  - s/p Lasix 20 IVP once. Will hold further diuretics given RAZIA.   - Cr now wnl. Consider slowly re-implement home diuretics.      Problem/Plan - 3:  ·  Problem: CAD.   ·  Plan: Recent PCI to pLAD in June 2023  - ECG non-ischemic  - Trop/CKMB as noted above  - c/w Plavix and statin,   -ASA discontinued   - C/W Eliquis   - TTE unchanged     Problem/Plan - 4:  ·  Problem: RAZIA - resolved Cr wnl   ·  Plan: c/w hydration and monitor kidney functions (11/4/23 cr 1.13)     Problem/Plan - 5:  ·  Problem: New Onset Atrial Fibrillation  - As per tele, converted to rapid AF rates 120-140bpm  - TSH wnl  - s/p TRACY without thrombus, s/p DCCV with return of sinus  - [id number] of 2. Cont Eliquis 5mg BID  - Cont Toprol 25mg PO daily  - C/w of Sotalol as per EP--> monitor qTC  -11/2 ECG unchanged from previous         ESTEFANI Velez, DO [facility]  Cardiovascular Medicine  [address] [place] [address], [address]  Available through call or text on [employer] TEAMs  Office: [phone]   DATE OF SERVICE: 11-04-23 @ 08:30    Patient is a 78y old  Male who presents with a chief complaint of Chest pain (03 Nov 2023 17:35)      INTERVAL HISTORY:     REVIEW OF SYSTEMS:  CONSTITUTIONAL: No weakness  EYES/ENT: No visual changes;  No throat pain   NECK: No pain or stiffness  RESPIRATORY: No cough, wheezing; No shortness of breath  CARDIOVASCULAR: No chest pain or palpitations  GASTROINTESTINAL: No abdominal  pain. No nausea, vomiting, or hematemesis  GENITOURINARY: No dysuria, frequency or hematuria  NEUROLOGICAL: No stroke like symptoms  SKIN: No rashes    TELEMETRY Personally reviewed: no events   	  MEDICATIONS:  hydrALAZINE 10 milliGRAM(s) Oral three times a day  metoprolol succinate ER 25 milliGRAM(s) Oral daily  sotalol 80 milliGRAM(s) Oral every 12 hours        PHYSICAL EXAM:  T(C): 36.6 (11-04-23 @ 04:40), Max: 36.6 (11-03-23 @ 19:00)  HR: 69 (11-04-23 @ 04:40) (64 - 70)  BP: 122/67 (11-04-23 @ 04:40) (105/64 - 122/67)  RR: 18 (11-04-23 @ 04:40) (18 - 18)  SpO2: 97% (11-04-23 @ 04:40) (97% - 98%)  Wt(kg): --  I&O's Summary    03 Nov 2023 07:01  -  04 Nov 2023 07:00  --------------------------------------------------------  IN: 380 mL / OUT: 950 mL / NET: -570 mL          Appearance: In no distress	  HEENT:    PERRL, EOMI	  Cardiovascular:  S1 S2, No JVD  Respiratory: Lungs clear to auscultation	  Gastrointestinal:  Soft, Non-tender, + BS	  Vascularature:  No edema of LE  Psychiatric: Appropriate affect   Neuro: no acute focal deficits                               9.7    9.06  )-----------( 401      ( 04 Nov 2023 06:17 )             30.9     11-04    140  |  108  |  34<H>  ----------------------------<  101<H>  4.2   |  20<L>  |  1.13    Ca    8.9      04 Nov 2023 06:18  Mg     1.7     11-04    TPro  5.7<L>  /  Alb  2.6<L>  /  TBili  0.3  /  DBili  x   /  AST  66<H>  /  ALT  75<H>  /  AlkPhos  226<H>  11-04    ECG 11/2/23  NORMAL SINUS RHYTHM  LOW VOLTAGE QRS  SEPTAL INFARCT (CITED ON OR BEFORE 01-NOV-2023)  ABNORMAL ECG  WHEN COMPARED WITH ECG OF 02-NOV-2023 05:02, (UNCONFIRMED)  NO SIGNIFICANT CHANGE WAS FOUND    Labs personally reviewed      ASSESSMENT/PLAN: 	  78 M with hx of CAD s/p NURIA to distal LAD in 6/2023, HTN, HLD, EtOH disorder, T2DM, spinal surgeries, neuroendocrine tumor on Lutathera (first treatment on Friday 10/20 at [facility] [place]), presenting with fatigue and chest pain since Friday.        Problem/Plan - 1:  ·  Problem: Chest Pain improving   ·  Plan: Location described as LUQ abdomen with pain elicited with palpation, appears noncardiac   - EKG SR with PACs, no ischemic characteristics  - Trop 72-->83--> 81/ CKMB 1.9  - 10/26 reports CP, trop increased to 110 --> 133 likely 2/2 demand i/s/o ST and fever. Trend to peak.   - Mildly elevated LFTS and Lipase noted  - Hx of recent PCI  - Repeat TTE unchanged   - CP possibly 2/2 lutathera with known S/E of abdominal discomfort     Problem/Plan - 2:  ·  Problem: Shortness of Breath  ·  Plan: ECG non-ischemic  - TTE wnl.  - BNP 2572. Repeat 4171.  - CT neg for PE, no pulm edema noted  - s/p Lasix 20 IVP once. Will hold further diuretics given RAZIA.   - Cr now wnl. Consider slowly re-implement home diuretics.      Problem/Plan - 3:  ·  Problem: CAD.   ·  Plan: Recent PCI to pLAD in June 2023  - ECG non-ischemic  - Trop/CKMB as noted above  - c/w Plavix and statin,   -ASA discontinued   - C/W Eliquis   - TTE unchanged     Problem/Plan - 4:  ·  Problem: RAZIA - resolved Cr wnl   ·  Plan: c/w hydration and monitor kidney functions (11/4/23 cr 1.13)     Problem/Plan - 5:  ·  Problem: New Onset Atrial Fibrillation  - As per tele, converted to rapid AF rates 120-140bpm  - TSH wnl  - s/p TRACY without thrombus, s/p DCCV with return of sinus  - [id number] of 2. Cont Eliquis 5mg BID  - Cont Toprol 25mg PO daily  - C/w of Sotalol as per EP--> monitor qTC  -11/2 ECG unchanged from previous         ESTEFANI Velez,  [facility]  Cardiovascular Medicine  [address] [place] [address], [address]  Available through call or text on [employer] TEAM[employer]  Office: [phone]   DATE OF SERVICE: 11-04-23 @ 08:30    Patient is a 78y old  Male who presents with a chief complaint of Chest pain (03 Nov 2023 17:35)      INTERVAL HISTORY:     REVIEW OF SYSTEMS:  CONSTITUTIONAL: No weakness  EYES/ENT: No visual changes;  No throat pain   NECK: No pain or stiffness  RESPIRATORY: No cough, wheezing; No shortness of breath  CARDIOVASCULAR: No chest pain or palpitations  GASTROINTESTINAL: No abdominal  pain. No nausea, vomiting, or hematemesis  GENITOURINARY: No dysuria, frequency or hematuria  NEUROLOGICAL: No stroke like symptoms  SKIN: No rashes    TELEMETRY Personally reviewed: no events   	  MEDICATIONS:  hydrALAZINE 10 milliGRAM(s) Oral three times a day  metoprolol succinate ER 25 milliGRAM(s) Oral daily  sotalol 80 milliGRAM(s) Oral every 12 hours        PHYSICAL EXAM:  T(C): 36.6 (11-04-23 @ 04:40), Max: 36.6 (11-03-23 @ 19:00)  HR: 69 (11-04-23 @ 04:40) (64 - 70)  BP: 122/67 (11-04-23 @ 04:40) (105/64 - 122/67)  RR: 18 (11-04-23 @ 04:40) (18 - 18)  SpO2: 97% (11-04-23 @ 04:40) (97% - 98%)  Wt(kg): --  I&O's Summary    03 Nov 2023 07:01  -  04 Nov 2023 07:00  --------------------------------------------------------  IN: 380 mL / OUT: 950 mL / NET: -570 mL          Appearance: In no distress	  HEENT:    PERRL, EOMI	  Cardiovascular:  S1 S2, No JVD  Respiratory: Lungs clear to auscultation	  Gastrointestinal:  Soft, Non-tender, + BS	  Vascularature:  No edema of LE  Psychiatric: Appropriate affect   Neuro: no acute focal deficits                               9.7    9.06  )-----------( 401      ( 04 Nov 2023 06:17 )             30.9     11-04    140  |  108  |  34<H>  ----------------------------<  101<H>  4.2   |  20<L>  |  1.13    Ca    8.9      04 Nov 2023 06:18  Mg     1.7     11-04    TPro  5.7<L>  /  Alb  2.6<L>  /  TBili  0.3  /  DBili  x   /  AST  66<H>  /  ALT  75<H>  /  AlkPhos  226<H>  11-04    ECG 11/2/23  NORMAL SINUS RHYTHM  LOW VOLTAGE QRS  SEPTAL INFARCT (CITED ON OR BEFORE 01-NOV-2023)  ABNORMAL ECG  WHEN COMPARED WITH ECG OF 02-NOV-2023 05:02, (UNCONFIRMED)  NO SIGNIFICANT CHANGE WAS FOUND    Labs personally reviewed      ASSESSMENT/PLAN: 	  78 M with hx of CAD s/p NURIA to distal LAD in 6/2023, HTN, HLD, EtOH disorder, T2DM, spinal surgeries, neuroendocrine tumor on Lutathera (first treatment on Friday 10/20 at [facility] [place]), presenting with fatigue and chest pain since Friday.        Problem/Plan - 1:  ·  Problem: Chest Pain improving   ·  Plan: Location described as LUQ abdomen with pain elicited with palpation, appears noncardiac   - EKG SR with PACs, no ischemic characteristics  - Trop 72-->83--> 81/ CKMB 1.9  - 10/26 reports CP, trop increased to 110 --> 133 likely 2/2 demand i/s/o ST and fever. Trend to peak.   - Mildly elevated LFTS and Lipase noted  - Hx of recent PCI  - Repeat TTE unchanged   - CP possibly 2/2 lutathera with known S/E of abdominal discomfort     Problem/Plan - 2:  ·  Problem: Shortness of Breath  ·  Plan: ECG non-ischemic  - TTE wnl.  - BNP 2572. Repeat 4171.  - CT neg for PE, no pulm edema noted  - s/p Lasix 20 IVP once. Will hold further diuretics given RAZIA.   - Cr now wnl. Consider slowly re-implement home diuretics.      Problem/Plan - 3:  ·  Problem: CAD.   ·  Plan: Recent PCI to pLAD in June 2023  - ECG non-ischemic  - Trop/CKMB as noted above  - c/w Plavix and statin,   -ASA discontinued   - C/W Eliquis   - TTE unchanged     Problem/Plan - 4:  ·  Problem: RAZIA - resolved Cr wnl   ·  Plan: c/w hydration and monitor kidney functions (11/4/23 cr 1.13)     Problem/Plan - 5:  ·  Problem: New Onset Atrial Fibrillation  - As per tele, converted to rapid AF rates 120-140bpm  - TSH wnl  - s/p TRACY without thrombus, s/p DCCV with return of sinus  - [id number] of 2. Cont Eliquis 5mg BID  - Cont Toprol 25mg PO daily  - C/w of Sotalol as per EP--> monitor qTC  -11/2 ECG unchanged from previous         ESTEFANI Velez,  [facility]  Cardiovascular Medicine  [address] [place] [address], [address]  Available through call or text on [employer] TEAM[employer]  Office: [phone]

## 2023-11-04 NOTE — PROGRESS NOTE ADULT - NS ATTEND AMEND GEN_ALL_CORE FT
Patient care and plan discussed and reviewed with Advanced Care Provider. Plan as outlined above edited by me to reflect our discussion.
Patient care and plan discussed and reviewed with Advanced Care Provider. Plan as outlined above edited by me to reflect our discussion.
As above.    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 No further complaints. Electrophysiology now planning on TRACY/DCCV tomorrow and amiodarone after.
He is s/p cardioversion on 10/31, now started on sotolol.    On eliquis and plavix per cardiology.    Agree with afib may be related to the Lutathera, [place] [facility] [place]  Discharge planning for tomorrow.
Patient care and plan discussed and reviewed with Advanced Care Provider. Plan as outlined above edited by me to reflect our discussion.
Patient care and plan discussed and reviewed with Advanced Care Provider. Plan as outlined above edited by me to reflect our discussion.
not having chest pain. now with afib. on ac. will cont to [place] [facility] [place]
Agree with above
Patient care and plan discussed and reviewed with Advanced Care Provider. Plan as outlined above edited by me to reflect our discussion.
Patient seen at bedside. ECG reviewed. Continue with Sotalol and a/c. No further workup from an EP perspective. Remains on Lipitor 80 with transaminitis - defer to General Cardiology.    JESSI Albrecht
metastatic pancreatic neuroendocrine tumor  was treated with lutathera  admitted for chest pain, fever  afebrile today, says chest pain largely resolved   developed new onset afib, started on eliquis, cardiology following
metastatic pancreatic neuroendocrine tumor  was treated with lutathera  admitted for chest pain, fever  chest pain reportedly improved  pending LE doppler to r/o DVT
Patient care and plan discussed and reviewed with Advanced Care Provider. Plan as outlined above edited by me to reflect our discussion.
s/p DCCV, start Amio, monitor LFTs.     JESSI Albrecht
He is s/p cardioversion on 10/31, now started on sotolol.    On eliquis and plavix per cardiology.    Agree with afib may be related to the Lutathera, to readdress about continuing treatment at [facility] [place].
I personally reviewed his ECG post 2nd dose of Sotalol - QTc: 440ms. Maintaining sinus. c/w a/c and Sotalol. ECG 2hrs post each dose of Sotalol for the first 5 doses. EP will follow.    JESSI Albrecht
Patient with chest pain, found with Afib  Needing cardiac intervention  Currently needing apixaban for at least a month following cardioversion,  According to [employer] there is very low risk of Grade 3 and 4 thrombocytopenia with Lutathera (1%),  May continue eliquis  There is 5 % risk of Afib with this med,  spoke to daughter about this and further [place] [facility] [place]  [place] follow
Patient seen at bedside. He is maintaining sinus post DCCV. I discussed this case with the patient's outpatient Cardiologist (Dr. JESSI Hameed). The patient's LFTs were reportedly normal in September. He has had mild transaminitis this admission. He is on Lipitor 80. It is unclear if he needs this high of a dose. Given mild transaminitis will start Sotalol - needs QTc monitoring for every dose for the first 5 doses. I spoke with the patient about the pro-arrhythmic risk of this medication. EP will follow.    JESSI Albrecht
Patient care and plan discussed and reviewed with Advanced Care Provider. Plan as outlined above edited by me to reflect our discussion.
Patient seen at bedside. ECG s/p 4th dose of Sotalol reviewed - atrial bigem - QTc: 480ms, continue Sotalol. Needs ECG monitoring 2 hours after each dose of Sotalol for the first 5 doses. EP will follow.    JESSI Albrecht

## 2023-11-04 NOTE — PROGRESS NOTE ADULT - REASON FOR ADMISSION
Chest pain

## 2023-11-04 NOTE — DISCHARGE NOTE PROVIDER - HOSPITAL COURSE
HPI:  78 M with hx of CAD s/p NURIA to distal LAD in 6/2023, HTN, HLD, EtOH disorder, T2DM, spinal surgeries, neuroendocrine tumor on Lutathera (first treatment on Friday 10/20 at [facility] [place]), presenting with fatigue and chest pain since Friday. Patient reports severe fatigue starting Friday after his first treatment with Lutathera. Pt also reported mid-left lower chest pain described as pressure. No radiation, no aggravating or alleviating factors. Pt has never felt this type of chest pain before. Denies palpitations, diaphoresis, dyspnea, nausea, vomiting. Pt has diffuse abdominal pain. Also with constipation; last BM several days ago. No other acute complaints. In ED, patient developed fever with Tmax 100.7. CT imaging negative for PE but showed interval worsening of metastatic hepatic lesions. UA unremarkable. Troponin 73 --> 82, EKG showing sinus tachycardia with PACs. Pt given cefepime in ED.  (23 Oct 2023 23:34)    Hospital Course:  - SIRS of unclear etiology- s/p empiric Ceftriaxone given history of chemotherapy and completed course; bld cxs NGTD  - Afib: EP s/p TRACY and DCCV on 10/31 now in NSR; no thrombus was started on Amiodarone load but after discussion with outpatient Cards, Amiodarone changed to sotalol  - Transaminitis ?2/2 chemo (Lutathera)- CT A/P w/ metastatic hepatic lesions, oncology on board no inptn treatment   - Chronic stable angina/CAD (HsT 73>82)- monitor on tele; c/w ASA/plavix/statin  - Hx Neuroendocrine cancer - CT with worsening liver mets; no systemic therapy while in patient      Important Medication Changes and Reason:  Amiodarone changed to Sotalol    Active or Pending Issues Requiring Follow-up:  Follow up with EP    Advanced Directives:   [ x] Full code  [ ] DNR  [ ] Hospice    Discharge Diagnoses:         HPI:  78 M with hx of CAD s/p NURIA to distal LAD in 6/2023, HTN, HLD, EtOH disorder, T2DM, spinal surgeries, neuroendocrine tumor on Lutathera (first treatment on Friday 10/20 at [facility] [place]), presenting with fatigue and chest pain since Friday. Patient reports severe fatigue starting Friday after his first treatment with Lutathera. Pt also reported mid-left lower chest pain described as pressure. No radiation, no aggravating or alleviating factors. Pt has never felt this type of chest pain before. Denies palpitations, diaphoresis, dyspnea, nausea, vomiting. Pt has diffuse abdominal pain. Also with constipation; last BM several days ago. No other acute complaints. In ED, patient developed fever with Tmax 100.7. CT imaging negative for PE but showed interval worsening of metastatic hepatic lesions. UA unremarkable. Troponin 73 --> 82, EKG showing sinus tachycardia with PACs. Pt given cefepime in ED.  (23 Oct 2023 23:34)    Hospital Course:  - SIRS of unclear etiology- s/p empiric Ceftriaxone given history of chemotherapy and completed course; bld cxs NGTD  - Afib: EP s/p TRACY and DCCV on 10/31 now in NSR; no thrombus was started on Amiodarone load but after discussion with outpatient Cards, Amiodarone changed to sotalol  - Transaminitis ?2/2 chemo (Lutathera)- CT A/P w/ metastatic hepatic lesions, oncology on board no inptn treatment   - Chronic stable angina/CAD (HsT 73>82)- monitor on tele; c/w ASA/plavix/statin  - Hx Neuroendocrine cancer - CT with worsening liver mets; no systemic therapy while in patient      Important Medication Changes and Reason:  Amiodarone changed to Sotalol  Started eliquis and stopped aspirin    Active or Pending Issues Requiring Follow-up:  Follow up with EP    Advanced Directives:   [ x] Full code  [ ] DNR  [ ] Hospice    Discharge Diagnoses:         HPI:  78 M with hx of CAD s/p NURIA to distal LAD in 6/2023, HTN, HLD, EtOH disorder, T2DM, spinal surgeries, neuroendocrine tumor on Lutathera (first treatment on Friday 10/20 at [facility] [place] presenting with fatigue and chest pain since Friday. Patient reports severe fatigue starting Friday after his first treatment with Lutathera. Pt also reported mid-left lower chest pain described as pressure. No radiation, no aggravating or alleviating factors. Pt has never felt this type of chest pain before. Denies palpitations, diaphoresis, dyspnea, nausea, vomiting. Pt has diffuse abdominal pain. Also with constipation; last BM several days ago. No other acute complaints. In ED, patient developed fever with Tmax 100.7. CT imaging negative for PE but showed interval worsening of metastatic hepatic lesions. UA unremarkable. Troponin 73 --> 82, EKG showing sinus tachycardia with PACs. Pt given cefepime in ED.  (23 Oct 2023 23:34)    Hospital Course:  - SIRS of unclear etiology- s/p empiric Ceftriaxone given history of chemotherapy and completed course; bld cxs NGTD  - Afib: EP s/p TRACY and DCCV on 10/31 now in NSR; no thrombus was started on Amiodarone load but after discussion with outpatient Cards, Amiodarone changed to sotalol  - Transaminitis ?2/2 chemo (Lutathera)- CT A/P w/ metastatic hepatic lesions, oncology on board no inptn treatment   - Chronic stable angina/CAD (HsT 73>82)- monitor on tele; c/w plavix/statin, ASA stopped   - Hx Neuroendocrine cancer - CT with worsening liver mets; no systemic therapy while in patient      Important Medication Changes and Reason:  Amiodarone changed to Sotalol  Started eliquis and stopped aspirin    Active or Pending Issues Requiring Follow-up:  Follow up with EP    Advanced Directives:   [ x] Full code  [ ] DNR  [ ] Hospice    Discharge Diagnoses:

## 2023-11-04 NOTE — PROGRESS NOTE ADULT - ASSESSMENT
79 y/o M with hx of CAD s/p NURIA to distal LAD in 6/2023, HTN, HLD, prior EtOH disorder (denies current use), T2DM, Scoliosis, spinal surgeries, Metastatic Pancreatic neuroendocrine tumor on Lutathera (first treatment on 10/20/23 at [facility] [place] presenting with fatigue and chest pain. EP consulted for new onset AFib started on 10/26/23 in-patient with RVR. Now s/p successful TRACY/DCCV on 10/31/23 to restore sinus rhythm. The patient's LFTs were reportedly normal in September. He has had mild transaminitis this admission. Therefore AAD changed from Amiodarone to Sotalol given mild transaminitis.     1. new onset AF w/ RVR, now s/p TRACY DCCV 10/31   2. Metastatic pancreatic neuroendocrine tumor  3. Mild transaminitis     - Maintaining SR post DCCV, started on Sotalol 80mg bid. After Sotalol 5th dose, QTC 472ms. Continue at current dosing, hold for QTC >500  - Continue Eliquis 5mg PO BID without interruption  - Continue Metoprolol succinate 25mg QD  - Supplement to ensure K>4.0, Mg >2.0  - Continue tele monitoring while inpatient   - Given patient has completed his 5 doses of Sotalol with QTC monitoring while inpatient, no further EP intervention. Stable for d/c from EP perspective on Sotalol and Eliquis.    D/w attending, EP will sign off

## 2023-11-04 NOTE — DISCHARGE NOTE PROVIDER - ATTENDING DISCHARGE PHYSICAL EXAMINATION:
Patient feels well. No chest pain, palpitations. Review of telemetry shows that he is in sinus rhythm with HRs in the 60s to 70s. Feels ready to go home.    CONSTITUTIONAL: Well-groomed, in no apparent distress  EYES: No conjunctival or scleral injection, non-icteric; PERRLA and symmetric  ENMT: No external nasal lesions; no pharyngeal injection or exudates, oral mucosa with moist membranes  NECK: Trachea midline without palpable neck mass; thyroid not enlarged and non-tender  RESPIRATORY: Breathing comfortably; lungs CTA without wheeze/rhonchi/rales  CARDIOVASCULAR: +S1S2, RRR, no M/G/R; pedal pulses full and symmetric; no lower extremity edema  GASTROINTESTINAL: No palpable masses or tenderness, +BS throughout, no rebound/guarding; no hepatosplenomegaly; no hernia palpated  MUSCULOSKELETAL: no digital clubbing or cyanosis; no paraspinal tenderness; normal strength and tone of extremities  SKIN: No rashes or ulcers noted; no subcutaneous nodules or induration palpable  NEUROLOGIC: CN II-XII intact; sensation intact in LEs b/l to light touch  PSYCHIATRIC: A+O x 3; mood and affect appropriate; appropriate insight and judgment

## 2023-11-04 NOTE — DISCHARGE NOTE PROVIDER - DETAILS OF MALNUTRITION DIAGNOSIS/DIAGNOSES
This patient has been assessed with a concern for Malnutrition and was treated during this hospitalization for the following Nutrition diagnosis/diagnoses:     -  11/02/2023: Severe protein-calorie malnutrition

## 2023-11-04 NOTE — DISCHARGE NOTE PROVIDER - NSDCMRMEDTOKEN_GEN_ALL_CORE_FT
allopurinol 100 mg oral tablet: 2 tab(s) orally once a day  aspirin 81 mg oral delayed release tablet: 1 tab(s) orally once a day  Crestor 20 mg oral tablet: 1 tab(s) orally once a day (at bedtime)  metFORMIN 1000 mg oral tablet: 1 tab(s) orally 2 times a day  metoprolol succinate 25 mg oral tablet, extended release: 1 tab(s) orally once a day  pantoprazole 40 mg oral delayed release tablet: 1 tab(s) orally once a day  Plavix 75 mg oral tablet: 1 tab(s) orally once a day  potassium chloride 20 mEq oral tablet, extended release: 1 tab(s) orally 2 times a day  Rolling Walker: Weakness  spironolactone 25 mg oral tablet: 1 tab(s) orally 2 times a day  torsemide 20 mg oral tablet: 2 tab(s) orally 2 times a day  Vitamin D3 125 mcg (5000 intl units) oral capsule: 1 cap(s) orally once a day   allopurinol 100 mg oral tablet: 2 tab(s) orally once a day  apixaban 5 mg oral tablet: 1 tab(s) orally 2 times a day  Crestor 20 mg oral tablet: 1 tab(s) orally once a day (at bedtime)  metFORMIN 1000 mg oral tablet: 1 tab(s) orally 2 times a day  Metoprolol Succinate ER 25 mg oral tablet, extended release: 1 tab(s) orally once a day  pantoprazole 40 mg oral delayed release tablet: 1 tab(s) orally once a day (before a meal)  Plavix 75 mg oral tablet: 1 tab(s) orally once a day  sotalol 80 mg oral tablet: 1 tab(s) orally every 12 hours  spironolactone 25 mg oral tablet: 1 tab(s) orally 2 times a day  Vitamin D3 125 mcg (5000 intl units) oral capsule: 1 cap(s) orally once a day

## 2023-11-04 NOTE — DISCHARGE NOTE PROVIDER - NSDCCPCAREPLAN_GEN_ALL_CORE_FT
PRINCIPAL DISCHARGE DIAGNOSIS  Diagnosis: New onset atrial fibrillation  Assessment and Plan of Treatment: You had a Cardioversion.  You were started on Sotalol.  Continue your medications and follow up [facility] electrophysiology.     PRINCIPAL DISCHARGE DIAGNOSIS  Diagnosis: New onset atrial fibrillation  Assessment and Plan of Treatment: You had a Cardioversion.  You were started on Sotalol.  Continue your medications and follow up [facility] electrophysiology. Follow up with your cardiologist about when you should resume your torsemide (a diuretic, also known as a "water pill"). STOP taking aspirin.

## 2023-11-04 NOTE — PROGRESS NOTE ADULT - PROVIDER SPECIALTY LIST ADULT
Cardiology
Heme/Onc
Heme/Onc
Infectious Disease
Cardiology
Electrophysiology
Heme/Onc
Cardiology
Electrophysiology
Heme/Onc
Palliative Care
Electrophysiology
Heme/Onc
Heme/Onc
Internal Medicine
Hospitalist
Internal Medicine
Hospitalist
Internal Medicine
Hospitalist

## 2023-11-04 NOTE — PROGRESS NOTE ADULT - NS ATTEND OPT1 GEN_ALL_CORE

## 2023-11-04 NOTE — DISCHARGE NOTE PROVIDER - NSDCFUSCHEDAPPT_GEN_ALL_CORE_FT
Talia Eduardo  [facility]  RHEUM [address]  Scheduled Appointment: 11/28/2023    Agustin Hameed  [facility]  CARDIOLOGY [address]   Scheduled Appointment: 01/05/2024    Agustin Oliver  [facility]  OPHTHALM [address]  Scheduled Appointment: 01/12/2024

## 2023-11-04 NOTE — DISCHARGE NOTE PROVIDER - PROVIDER TOKENS
FREE:[LAST:[Follow up],PHONE:[(   )    -],FAX:[(   )    -],ADDRESS:[with Dr. Albrecht at the location provided.],FOLLOWUP:[1 week]],PROVIDER:[TOKEN:[[id number]],FOLLOWUP:[1 week]]

## 2023-11-04 NOTE — PROGRESS NOTE ADULT - SUBJECTIVE AND OBJECTIVE BOX
24H hour events: No events overnight, SR 60-70's with 4bts WCT     MEDICATIONS:  apixaban 5 milliGRAM(s) Oral two times a day  clopidogrel Tablet 75 milliGRAM(s) Oral daily  hydrALAZINE 10 milliGRAM(s) Oral three times a day  metoprolol succinate ER 25 milliGRAM(s) Oral daily  sotalol 80 milliGRAM(s) Oral every 12 hours        acetaminophen     Tablet .. 975 milliGRAM(s) Oral every 6 hours PRN    pantoprazole    Tablet 40 milliGRAM(s) Oral before breakfast  polyethylene glycol 3350 17 Gram(s) Oral daily  senna 2 Tablet(s) Oral at bedtime    allopurinol 200 milliGRAM(s) Oral daily  atorvastatin 80 milliGRAM(s) Oral at bedtime  dextrose 50% Injectable 25 Gram(s) IV Push once  dextrose 50% Injectable 12.5 Gram(s) IV Push once  dextrose 50% Injectable 25 Gram(s) IV Push once  dextrose Oral Gel 15 Gram(s) Oral once PRN  glucagon  Injectable 1 milliGRAM(s) IntraMuscular once  insulin lispro (ADMELOG) corrective regimen sliding scale   SubCutaneous three times a day before meals  insulin lispro (ADMELOG) corrective regimen sliding scale   SubCutaneous at bedtime    chlorhexidine 2% Cloths 1 Application(s) Topical daily  dextrose 5%. 1000 milliLiter(s) IV Continuous <Continuous>  dextrose 5%. 1000 milliLiter(s) IV Continuous <Continuous>      REVIEW OF SYSTEMS:  See HPI, otherwise ROS negative.    PHYSICAL EXAM:  T(C): 36.6 (11-04-23 @ 04:40), Max: 36.6 (11-03-23 @ 19:00)  HR: 69 (11-04-23 @ 04:40) (64 - 70)  BP: 122/67 (11-04-23 @ 04:40) (105/64 - 122/67)  RR: 18 (11-04-23 @ 04:40) (18 - 18)  SpO2: 97% (11-04-23 @ 04:40) (97% - 98%)  Wt(kg): --  I&O's Summary    03 Nov 2023 07:01  -  04 Nov 2023 07:00  --------------------------------------------------------  IN: 380 mL / OUT: 950 mL / NET: -570 mL    04 Nov 2023 08:01  -  04 Nov 2023 10:12  --------------------------------------------------------  IN: 0 mL / OUT: 250 mL / NET: -250 mL        Appearance: Alert. NAD	  Cardiovascular: +S1S2 RRR no m/g/r  Respiratory: CTA B/L	  Psychiatry: A & O x 3, Mood & affect appropriate  Gastrointestinal:  Soft, NT. ND. +BS	  Skin: No rashes	masses or lesions  Neurologic: Non-focal  Extremities: No edema BLE  Vascular: Peripheral pulses palpable 2+ bilaterally      LABS:	 	    CBC Full  -  ( 04 Nov 2023 06:17 )  WBC Count : 9.06 K/uL  Hemoglobin : 9.7 g/dL  Hematocrit : 30.9 %  Platelet Count - Automated : 401 K/uL  Mean Cell Volume : 94.5 fl  Mean Cell Hemoglobin : 29.7 pg  Mean Cell Hemoglobin Concentration : 31.4 gm/dL  Auto Neutrophil # : x  Auto Lymphocyte # : x  Auto Monocyte # : x  Auto Eosinophil # : x  Auto Basophil # : x  Auto Neutrophil % : x  Auto Lymphocyte % : x  Auto Monocyte % : x  Auto Eosinophil % : x  Auto Basophil % : x    11-04    140  |  108  |  34<H>  ----------------------------<  101<H>  4.2   |  20<L>  |  1.13  11-03    138  |  107  |  36<H>  ----------------------------<  112<H>  4.0   |  18<L>  |  1.03    Ca    8.9      04 Nov 2023 06:18  Ca    8.8      03 Nov 2023 06:39  Mg     1.7     11-04  Mg     1.9     11-03    TPro  5.7<L>  /  Alb  2.6<L>  /  TBili  0.3  /  DBili  x   /  AST  66<H>  /  ALT  75<H>  /  AlkPhos  226<H>  11-04      TSH: Thyroid Stimulating Hormone, Serum: 3.74 uIU/mL (10.31.23 @ 07:06)    TELEMETRY: SR 60-70's   	    ECG:  	ECG reviewed s/p 5th dose Sotalol 11/3 PM: SR 69bpm, QTC manually calculated 472ms, QRS 92ms        24H hour events: No events overnight, SR 60-70's with 4bts WCT     MEDICATIONS:  apixaban 5 milliGRAM(s) Oral two times a day  clopidogrel Tablet 75 milliGRAM(s) Oral daily  hydrALAZINE 10 milliGRAM(s) Oral three times a day  metoprolol succinate ER 25 milliGRAM(s) Oral daily  sotalol 80 milliGRAM(s) Oral every 12 hours  acetaminophen     Tablet .. 975 milliGRAM(s) Oral every 6 hours PRN  pantoprazole    Tablet 40 milliGRAM(s) Oral before breakfast  polyethylene glycol 3350 17 Gram(s) Oral daily  senna 2 Tablet(s) Oral at bedtime  allopurinol 200 milliGRAM(s) Oral daily  atorvastatin 80 milliGRAM(s) Oral at bedtime  insulin lispro (ADMELOG) corrective regimen sliding scale   SubCutaneous three times a day before meals  insulin lispro (ADMELOG) corrective regimen sliding scale   SubCutaneous at bedtime  chlorhexidine 2% Cloths 1 Application(s) Topical daily    REVIEW OF SYSTEMS:  See HPI, otherwise ROS negative.    PHYSICAL EXAM:  T(C): 36.6 (11-04-23 @ 04:40), Max: 36.6 (11-03-23 @ 19:00)  HR: 69 (11-04-23 @ 04:40) (64 - 70)  BP: 122/67 (11-04-23 @ 04:40) (105/64 - 122/67)  RR: 18 (11-04-23 @ 04:40) (18 - 18)  SpO2: 97% (11-04-23 @ 04:40) (97% - 98%)    I&O's Summary    03 Nov 2023 07:01  -  04 Nov 2023 07:00  --------------------------------------------------------  IN: 380 mL / OUT: 950 mL / NET: -570 mL    04 Nov 2023 08:01  -  04 Nov 2023 10:12  --------------------------------------------------------  IN: 0 mL / OUT: 250 mL / NET: -250 mL    Appearance: Alert. NAD	  Cardiovascular: +S1S2 RRR no m/g/r  Respiratory: CTA B/L	  Psychiatry: A & O x 3, Mood & affect appropriate  Gastrointestinal:  Soft, NT. ND. +BS	  Skin: No rashes	masses or lesions  Neurologic: Non-focal  Extremities: No edema BLE  Vascular: Peripheral pulses palpable 2+ bilaterally    LABS:	 	    CBC Full  -  ( 04 Nov 2023 06:17 )  WBC Count : 9.06 K/uL  Hemoglobin : 9.7 g/dL  Hematocrit : 30.9 %  Platelet Count - Automated : 401 K/uL  Mean Cell Volume : 94.5 fl  Mean Cell Hemoglobin : 29.7 pg  Mean Cell Hemoglobin Concentration : 31.4 gm/dL    11-04    140  |  108  |  34<H>  ----------------------------<  101<H>  4.2   |  20<L>  |  1.13  11-03    138  |  107  |  36<H>  ----------------------------<  112<H>  4.0   |  18<L>  |  1.03    Ca    8.9      04 Nov 2023 06:18  Ca    8.8      03 Nov 2023 06:39  Mg     1.7     11-04  Mg     1.9     11-03    TPro  5.7<L>  /  Alb  2.6<L>  /  TBili  0.3  /  DBili  x   /  AST  66<H>  /  ALT  75<H>  /  AlkPhos  226<H>  11-04    TSH: Thyroid Stimulating Hormone, Serum: 3.74 uIU/mL (10.31.23 @ 07:06)    TELEMETRY: SR 60-70's   	    ECG reviewed s/p 5th dose Sotalol 11/3 PM: SR 69bpm, QTC manually calculated 472ms, QRS 92ms

## 2023-11-04 NOTE — PROGRESS NOTE ADULT - NUTRITIONAL ASSESSMENT
This patient has been assessed with a concern for Malnutrition and has been determined to have a diagnosis/diagnoses of Severe protein-calorie malnutrition.    This patient is being managed with:   Diet Regular-  Consistent Carbohydrate {No Snacks} (CSTCHO)  DASH/TLC {Sodium & Cholesterol Restricted} (DASH)  Supplement Feeding Modality:  Oral  Glucerna Shake Cans or Servings Per Day:  2       Frequency:  Daily  Entered: Oct 28 2023  1:38PM  

## 2023-11-04 NOTE — DISCHARGE NOTE PROVIDER - DISCHARGE DATE
TC to [facility]  Spoke with Teofilo Nolan   Confirmed that patient is on service and is receiving SN and PT services  04-Nov-2023

## 2023-11-04 NOTE — DISCHARGE NOTE PROVIDER - CARE PROVIDER_API CALL
Follow up,   with Dr. Albrecht at the location provided.  Phone: (   )    -  Fax: (   )    -  Follow Up Time: 1 week    Agustin Hameed  Cardiology  [address] [facility] [address]  [place] [address]  Phone: [phone]  Fax: [phone]  Follow Up Time: 1 week

## 2023-11-10 ENCOUNTER — NON-APPOINTMENT (OUTPATIENT)
Age: 78
End: 2023-11-10

## 2023-11-10 ENCOUNTER — APPOINTMENT (OUTPATIENT)
Dept: INTERNAL MEDICINE | Facility: CLINIC | Age: 78
End: 2023-11-10
Payer: MEDICARE

## 2023-11-10 VITALS
TEMPERATURE: 97.7 F | SYSTOLIC BLOOD PRESSURE: 100 MMHG | HEIGHT: 74 IN | WEIGHT: 254 LBS | BODY MASS INDEX: 32.6 KG/M2 | OXYGEN SATURATION: 99 % | HEART RATE: 69 BPM | DIASTOLIC BLOOD PRESSURE: 60 MMHG

## 2023-11-10 DIAGNOSIS — N40.1 BENIGN PROSTATIC HYPERPLASIA WITH LOWER URINARY TRACT SYMPMS: ICD-10-CM

## 2023-11-10 DIAGNOSIS — I48.91 UNSPECIFIED ATRIAL FIBRILLATION: ICD-10-CM

## 2023-11-10 DIAGNOSIS — N13.8 BENIGN PROSTATIC HYPERPLASIA WITH LOWER URINARY TRACT SYMPMS: ICD-10-CM

## 2023-11-10 DIAGNOSIS — N20.0 CALCULUS OF KIDNEY: ICD-10-CM

## 2023-11-10 PROCEDURE — 99496 TRANSJ CARE MGMT HIGH F2F 7D: CPT

## 2023-11-10 PROCEDURE — 93000 ELECTROCARDIOGRAM COMPLETE: CPT

## 2023-11-10 RX ORDER — CEPHALEXIN 500 MG/1
500 CAPSULE ORAL 4 TIMES DAILY
Qty: 28 | Refills: 0 | Status: DISCONTINUED | COMMUNITY
Start: 2023-04-07 | End: 2023-11-10

## 2023-11-10 RX ORDER — ASPIRIN 81 MG/1
81 TABLET ORAL
Refills: 0 | Status: DISCONTINUED | COMMUNITY
Start: 2021-10-14 | End: 2023-11-10

## 2023-11-10 RX ORDER — NYSTATIN/TRIAMCINOLONE ACET 100000-0.1
100000-0.1 CREAM (GRAM) TOPICAL
Refills: 0 | Status: DISCONTINUED | COMMUNITY
End: 2023-11-10

## 2023-11-10 RX ORDER — CIPROFLOXACIN HYDROCHLORIDE 500 MG/1
500 TABLET, FILM COATED ORAL
Qty: 14 | Refills: 1 | Status: DISCONTINUED | COMMUNITY
Start: 2023-05-30 | End: 2023-11-10

## 2023-11-10 RX ORDER — NYSTATIN 100000 [USP'U]/ML
100000 SUSPENSION ORAL 3 TIMES DAILY
Qty: 105 | Refills: 1 | Status: DISCONTINUED | COMMUNITY
Start: 2021-09-17 | End: 2023-11-10

## 2023-11-10 RX ORDER — POTASSIUM CHLORIDE 1500 MG/1
20 TABLET, FILM COATED, EXTENDED RELEASE ORAL TWICE DAILY
Qty: 180 | Refills: 0 | Status: DISCONTINUED | COMMUNITY
Start: 2021-05-26 | End: 2023-11-10

## 2023-11-10 RX ORDER — DOXYCYCLINE 100 MG/1
100 CAPSULE ORAL TWICE DAILY
Qty: 20 | Refills: 0 | Status: DISCONTINUED | COMMUNITY
Start: 2023-04-28 | End: 2023-11-10

## 2023-11-10 RX ORDER — ERYTHROMYCIN 5 MG/G
5 OINTMENT OPHTHALMIC
Qty: 15 | Refills: 0 | Status: DISCONTINUED | COMMUNITY
Start: 2022-07-01 | End: 2023-11-10

## 2023-11-10 RX ORDER — METHYLPREDNISOLONE 4 MG/1
4 TABLET ORAL
Qty: 1 | Refills: 0 | Status: DISCONTINUED | COMMUNITY
Start: 2023-03-23 | End: 2023-11-10

## 2023-11-12 PROBLEM — N40.1 BPH WITH URINARY OBSTRUCTION: Status: ACTIVE | Noted: 2023-10-05

## 2023-11-12 PROBLEM — I48.91 ATRIAL FIBRILLATION: Status: ACTIVE | Noted: 2023-11-10

## 2023-11-12 PROBLEM — N20.0 NEPHROLITHIASIS: Status: ACTIVE | Noted: 2023-10-05

## 2023-11-13 LAB
ALBUMIN SERPL ELPH-MCNC: 3.2 G/DL
ALP BLD-CCNC: 252 U/L
ALT SERPL-CCNC: 45 U/L
ANION GAP SERPL CALC-SCNC: 11 MMOL/L
AST SERPL-CCNC: 39 U/L
BASOPHILS # BLD AUTO: 0.04 K/UL
BASOPHILS NFR BLD AUTO: 0.6 %
BILIRUB SERPL-MCNC: 0.2 MG/DL
BUN SERPL-MCNC: 20 MG/DL
CALCIUM SERPL-MCNC: 9 MG/DL
CHLORIDE SERPL-SCNC: 108 MMOL/L
CO2 SERPL-SCNC: 22 MMOL/L
CREAT SERPL-MCNC: 1 MG/DL
EGFR: 77 ML/MIN/1.73M2
EOSINOPHIL # BLD AUTO: 0.15 K/UL
EOSINOPHIL NFR BLD AUTO: 2.3 %
FERRITIN SERPL-MCNC: 1059 NG/ML
FOLATE SERPL-MCNC: 4.2 NG/ML
GLUCOSE SERPL-MCNC: 106 MG/DL
HCT VFR BLD CALC: 33 %
HGB BLD-MCNC: 10.1 G/DL
IMM GRANULOCYTES NFR BLD AUTO: 0.3 %
IRON SATN MFR SERPL: 33 %
IRON SERPL-MCNC: 57 UG/DL
LYMPHOCYTES # BLD AUTO: 0.78 K/UL
LYMPHOCYTES NFR BLD AUTO: 12.2 %
MAN DIFF?: NORMAL
MCHC RBC-ENTMCNC: 29.3 PG
MCHC RBC-ENTMCNC: 30.6 GM/DL
MCV RBC AUTO: 95.7 FL
MONOCYTES # BLD AUTO: 0.67 K/UL
MONOCYTES NFR BLD AUTO: 10.5 %
NEUTROPHILS # BLD AUTO: 4.74 K/UL
NEUTROPHILS NFR BLD AUTO: 74.1 %
NT-PROBNP SERPL-MCNC: 1788 PG/ML
PLATELET # BLD AUTO: 267 K/UL
POTASSIUM SERPL-SCNC: 4.6 MMOL/L
PROT SERPL-MCNC: 5.6 G/DL
RBC # BLD: 3.45 M/UL
RBC # FLD: 15.3 %
SODIUM SERPL-SCNC: 140 MMOL/L
T4 FREE SERPL-MCNC: 1.4 NG/DL
TIBC SERPL-MCNC: 172 UG/DL
TSH SERPL-ACNC: 5.69 UIU/ML
UIBC SERPL-MCNC: 114 UG/DL
VIT B12 SERPL-MCNC: 760 PG/ML
WBC # FLD AUTO: 6.4 K/UL

## 2023-11-14 ENCOUNTER — APPOINTMENT (OUTPATIENT)
Dept: CARDIOLOGY | Facility: CLINIC | Age: 78
End: 2023-11-14

## 2023-11-15 ENCOUNTER — TRANSCRIPTION ENCOUNTER (OUTPATIENT)
Age: 78
End: 2023-11-15

## 2023-11-16 ENCOUNTER — APPOINTMENT (OUTPATIENT)
Dept: CARDIOLOGY | Facility: CLINIC | Age: 78
End: 2023-11-16
Payer: MEDICARE

## 2023-11-16 ENCOUNTER — INPATIENT (INPATIENT)
Facility: HOSPITAL | Age: 78
LOS: 5 days | Discharge: HOME | DRG: 321 | End: 2023-11-22
Attending: HOSPITALIST | Admitting: STUDENT IN AN ORGANIZED HEALTH CARE EDUCATION/TRAINING PROGRAM
Payer: MEDICARE

## 2023-11-16 VITALS — HEIGHT: 72 IN

## 2023-11-16 DIAGNOSIS — Z96.653 PRESENCE OF ARTIFICIAL KNEE JOINT, BILATERAL: Chronic | ICD-10-CM

## 2023-11-16 DIAGNOSIS — Z98.890 OTHER SPECIFIED POSTPROCEDURAL STATES: Chronic | ICD-10-CM

## 2023-11-16 DIAGNOSIS — R60.0 LOCALIZED EDEMA: ICD-10-CM

## 2023-11-16 DIAGNOSIS — I95.9 HYPOTENSION, UNSPECIFIED: ICD-10-CM

## 2023-11-16 DIAGNOSIS — E11.9 TYPE 2 DIABETES MELLITUS WITHOUT COMPLICATIONS: ICD-10-CM

## 2023-11-16 DIAGNOSIS — I95.89 OTHER HYPOTENSION: ICD-10-CM

## 2023-11-16 DIAGNOSIS — Z96.60 PRESENCE OF UNSPECIFIED ORTHOPEDIC JOINT IMPLANT: Chronic | ICD-10-CM

## 2023-11-16 DIAGNOSIS — I48.92 UNSPECIFIED ATRIAL FLUTTER: ICD-10-CM

## 2023-11-16 DIAGNOSIS — C22.0 LIVER CELL CARCINOMA: ICD-10-CM

## 2023-11-16 LAB
ALBUMIN SERPL ELPH-MCNC: 2.9 G/DL — LOW (ref 3.3–5)
ALBUMIN SERPL ELPH-MCNC: 2.9 G/DL — LOW (ref 3.3–5)
ALBUMIN SERPL ELPH-MCNC: 3.1 G/DL — LOW (ref 3.3–5)
ALBUMIN SERPL ELPH-MCNC: 3.1 G/DL — LOW (ref 3.3–5)
ALP SERPL-CCNC: 169 U/L — HIGH (ref 40–120)
ALP SERPL-CCNC: 169 U/L — HIGH (ref 40–120)
ALP SERPL-CCNC: 176 U/L — HIGH (ref 40–120)
ALP SERPL-CCNC: 176 U/L — HIGH (ref 40–120)
ALT FLD-CCNC: 22 U/L — SIGNIFICANT CHANGE UP (ref 10–45)
ALT FLD-CCNC: 22 U/L — SIGNIFICANT CHANGE UP (ref 10–45)
ALT FLD-CCNC: 41 U/L — SIGNIFICANT CHANGE UP (ref 10–45)
ALT FLD-CCNC: 41 U/L — SIGNIFICANT CHANGE UP (ref 10–45)
ANION GAP SERPL CALC-SCNC: 13 MMOL/L — SIGNIFICANT CHANGE UP (ref 5–17)
ANION GAP SERPL CALC-SCNC: 13 MMOL/L — SIGNIFICANT CHANGE UP (ref 5–17)
ANION GAP SERPL CALC-SCNC: 15 MMOL/L — SIGNIFICANT CHANGE UP (ref 5–17)
ANION GAP SERPL CALC-SCNC: 15 MMOL/L — SIGNIFICANT CHANGE UP (ref 5–17)
APPEARANCE UR: CLEAR — SIGNIFICANT CHANGE UP
APPEARANCE UR: CLEAR — SIGNIFICANT CHANGE UP
APTT BLD: 33.8 SEC — SIGNIFICANT CHANGE UP (ref 24.5–35.6)
APTT BLD: 33.8 SEC — SIGNIFICANT CHANGE UP (ref 24.5–35.6)
AST SERPL-CCNC: 35 U/L — SIGNIFICANT CHANGE UP (ref 10–40)
AST SERPL-CCNC: 35 U/L — SIGNIFICANT CHANGE UP (ref 10–40)
AST SERPL-CCNC: 85 U/L — HIGH (ref 10–40)
AST SERPL-CCNC: 85 U/L — HIGH (ref 10–40)
BASE EXCESS BLDV CALC-SCNC: 1.3 MMOL/L — SIGNIFICANT CHANGE UP (ref -2–3)
BASE EXCESS BLDV CALC-SCNC: 1.3 MMOL/L — SIGNIFICANT CHANGE UP (ref -2–3)
BASOPHILS # BLD AUTO: 0 K/UL — SIGNIFICANT CHANGE UP (ref 0–0.2)
BASOPHILS # BLD AUTO: 0 K/UL — SIGNIFICANT CHANGE UP (ref 0–0.2)
BASOPHILS NFR BLD AUTO: 0 % — SIGNIFICANT CHANGE UP (ref 0–2)
BASOPHILS NFR BLD AUTO: 0 % — SIGNIFICANT CHANGE UP (ref 0–2)
BILIRUB SERPL-MCNC: 0.5 MG/DL — SIGNIFICANT CHANGE UP (ref 0.2–1.2)
BILIRUB UR-MCNC: NEGATIVE — SIGNIFICANT CHANGE UP
BILIRUB UR-MCNC: NEGATIVE — SIGNIFICANT CHANGE UP
BUN SERPL-MCNC: 36 MG/DL — HIGH (ref 7–23)
BUN SERPL-MCNC: 36 MG/DL — HIGH (ref 7–23)
BUN SERPL-MCNC: 37 MG/DL — HIGH (ref 7–23)
BUN SERPL-MCNC: 37 MG/DL — HIGH (ref 7–23)
CA-I SERPL-SCNC: 1.1 MMOL/L — LOW (ref 1.15–1.33)
CA-I SERPL-SCNC: 1.1 MMOL/L — LOW (ref 1.15–1.33)
CALCIUM SERPL-MCNC: 8.6 MG/DL — SIGNIFICANT CHANGE UP (ref 8.4–10.5)
CHLORIDE BLDV-SCNC: 100 MMOL/L — SIGNIFICANT CHANGE UP (ref 96–108)
CHLORIDE BLDV-SCNC: 100 MMOL/L — SIGNIFICANT CHANGE UP (ref 96–108)
CHLORIDE SERPL-SCNC: 100 MMOL/L — SIGNIFICANT CHANGE UP (ref 96–108)
CHLORIDE SERPL-SCNC: 100 MMOL/L — SIGNIFICANT CHANGE UP (ref 96–108)
CHLORIDE SERPL-SCNC: 98 MMOL/L — SIGNIFICANT CHANGE UP (ref 96–108)
CHLORIDE SERPL-SCNC: 98 MMOL/L — SIGNIFICANT CHANGE UP (ref 96–108)
CO2 BLDV-SCNC: 28 MMOL/L — HIGH (ref 22–26)
CO2 BLDV-SCNC: 28 MMOL/L — HIGH (ref 22–26)
CO2 SERPL-SCNC: 23 MMOL/L — SIGNIFICANT CHANGE UP (ref 22–31)
CO2 SERPL-SCNC: 23 MMOL/L — SIGNIFICANT CHANGE UP (ref 22–31)
CO2 SERPL-SCNC: 25 MMOL/L — SIGNIFICANT CHANGE UP (ref 22–31)
CO2 SERPL-SCNC: 25 MMOL/L — SIGNIFICANT CHANGE UP (ref 22–31)
COLOR SPEC: YELLOW — SIGNIFICANT CHANGE UP
COLOR SPEC: YELLOW — SIGNIFICANT CHANGE UP
CREAT SERPL-MCNC: 1.38 MG/DL — HIGH (ref 0.5–1.3)
CREAT SERPL-MCNC: 1.38 MG/DL — HIGH (ref 0.5–1.3)
CREAT SERPL-MCNC: 1.45 MG/DL — HIGH (ref 0.5–1.3)
CREAT SERPL-MCNC: 1.45 MG/DL — HIGH (ref 0.5–1.3)
DIFF PNL FLD: NEGATIVE — SIGNIFICANT CHANGE UP
DIFF PNL FLD: NEGATIVE — SIGNIFICANT CHANGE UP
EGFR: 49 ML/MIN/1.73M2 — LOW
EGFR: 49 ML/MIN/1.73M2 — LOW
EGFR: 52 ML/MIN/1.73M2 — LOW
EGFR: 52 ML/MIN/1.73M2 — LOW
EOSINOPHIL # BLD AUTO: 0 K/UL — SIGNIFICANT CHANGE UP (ref 0–0.5)
EOSINOPHIL # BLD AUTO: 0 K/UL — SIGNIFICANT CHANGE UP (ref 0–0.5)
EOSINOPHIL NFR BLD AUTO: 0 % — SIGNIFICANT CHANGE UP (ref 0–6)
EOSINOPHIL NFR BLD AUTO: 0 % — SIGNIFICANT CHANGE UP (ref 0–6)
FLUAV AG NPH QL: SIGNIFICANT CHANGE UP
FLUAV AG NPH QL: SIGNIFICANT CHANGE UP
FLUBV AG NPH QL: SIGNIFICANT CHANGE UP
FLUBV AG NPH QL: SIGNIFICANT CHANGE UP
GAS PNL BLDV: 134 MMOL/L — LOW (ref 136–145)
GAS PNL BLDV: 134 MMOL/L — LOW (ref 136–145)
GAS PNL BLDV: SIGNIFICANT CHANGE UP
GAS PNL BLDV: SIGNIFICANT CHANGE UP
GIANT PLATELETS BLD QL SMEAR: PRESENT — SIGNIFICANT CHANGE UP
GIANT PLATELETS BLD QL SMEAR: PRESENT — SIGNIFICANT CHANGE UP
GLUCOSE BLDC GLUCOMTR-MCNC: 130 MG/DL — HIGH (ref 70–99)
GLUCOSE BLDC GLUCOMTR-MCNC: 130 MG/DL — HIGH (ref 70–99)
GLUCOSE BLDV-MCNC: 127 MG/DL — HIGH (ref 70–99)
GLUCOSE BLDV-MCNC: 127 MG/DL — HIGH (ref 70–99)
GLUCOSE SERPL-MCNC: 113 MG/DL — HIGH (ref 70–99)
GLUCOSE SERPL-MCNC: 113 MG/DL — HIGH (ref 70–99)
GLUCOSE SERPL-MCNC: 122 MG/DL — HIGH (ref 70–99)
GLUCOSE SERPL-MCNC: 122 MG/DL — HIGH (ref 70–99)
GLUCOSE UR QL: NEGATIVE MG/DL — SIGNIFICANT CHANGE UP
GLUCOSE UR QL: NEGATIVE MG/DL — SIGNIFICANT CHANGE UP
HCO3 BLDV-SCNC: 27 MMOL/L — SIGNIFICANT CHANGE UP (ref 22–29)
HCO3 BLDV-SCNC: 27 MMOL/L — SIGNIFICANT CHANGE UP (ref 22–29)
HCT VFR BLD CALC: 34.2 % — LOW (ref 39–50)
HCT VFR BLD CALC: 34.2 % — LOW (ref 39–50)
HCT VFR BLDA CALC: 38 % — LOW (ref 39–51)
HCT VFR BLDA CALC: 38 % — LOW (ref 39–51)
HGB BLD CALC-MCNC: 12.8 G/DL — SIGNIFICANT CHANGE UP (ref 12.6–17.4)
HGB BLD CALC-MCNC: 12.8 G/DL — SIGNIFICANT CHANGE UP (ref 12.6–17.4)
HGB BLD-MCNC: 11.1 G/DL — LOW (ref 13–17)
HGB BLD-MCNC: 11.1 G/DL — LOW (ref 13–17)
INR BLD: 1.81 RATIO — HIGH (ref 0.85–1.18)
INR BLD: 1.81 RATIO — HIGH (ref 0.85–1.18)
KETONES UR-MCNC: ABNORMAL MG/DL
KETONES UR-MCNC: ABNORMAL MG/DL
LACTATE BLDV-MCNC: 1.5 MMOL/L — SIGNIFICANT CHANGE UP (ref 0.5–2)
LACTATE BLDV-MCNC: 1.5 MMOL/L — SIGNIFICANT CHANGE UP (ref 0.5–2)
LEUKOCYTE ESTERASE UR-ACNC: NEGATIVE — SIGNIFICANT CHANGE UP
LEUKOCYTE ESTERASE UR-ACNC: NEGATIVE — SIGNIFICANT CHANGE UP
LYMPHOCYTES # BLD AUTO: 0.43 K/UL — LOW (ref 1–3.3)
LYMPHOCYTES # BLD AUTO: 0.43 K/UL — LOW (ref 1–3.3)
LYMPHOCYTES # BLD AUTO: 4.3 % — LOW (ref 13–44)
LYMPHOCYTES # BLD AUTO: 4.3 % — LOW (ref 13–44)
MANUAL SMEAR VERIFICATION: SIGNIFICANT CHANGE UP
MANUAL SMEAR VERIFICATION: SIGNIFICANT CHANGE UP
MCHC RBC-ENTMCNC: 30.2 PG — SIGNIFICANT CHANGE UP (ref 27–34)
MCHC RBC-ENTMCNC: 30.2 PG — SIGNIFICANT CHANGE UP (ref 27–34)
MCHC RBC-ENTMCNC: 32.5 GM/DL — SIGNIFICANT CHANGE UP (ref 32–36)
MCHC RBC-ENTMCNC: 32.5 GM/DL — SIGNIFICANT CHANGE UP (ref 32–36)
MCV RBC AUTO: 93.2 FL — SIGNIFICANT CHANGE UP (ref 80–100)
MCV RBC AUTO: 93.2 FL — SIGNIFICANT CHANGE UP (ref 80–100)
MONOCYTES # BLD AUTO: 1.23 K/UL — HIGH (ref 0–0.9)
MONOCYTES # BLD AUTO: 1.23 K/UL — HIGH (ref 0–0.9)
MONOCYTES NFR BLD AUTO: 12.2 % — SIGNIFICANT CHANGE UP (ref 2–14)
MONOCYTES NFR BLD AUTO: 12.2 % — SIGNIFICANT CHANGE UP (ref 2–14)
NEUTROPHILS # BLD AUTO: 8.43 K/UL — HIGH (ref 1.8–7.4)
NEUTROPHILS # BLD AUTO: 8.43 K/UL — HIGH (ref 1.8–7.4)
NEUTROPHILS NFR BLD AUTO: 83.5 % — HIGH (ref 43–77)
NEUTROPHILS NFR BLD AUTO: 83.5 % — HIGH (ref 43–77)
NITRITE UR-MCNC: NEGATIVE — SIGNIFICANT CHANGE UP
NITRITE UR-MCNC: NEGATIVE — SIGNIFICANT CHANGE UP
PCO2 BLDV: 44 MMHG — SIGNIFICANT CHANGE UP (ref 42–55)
PCO2 BLDV: 44 MMHG — SIGNIFICANT CHANGE UP (ref 42–55)
PH BLDV: 7.39 — SIGNIFICANT CHANGE UP (ref 7.32–7.43)
PH BLDV: 7.39 — SIGNIFICANT CHANGE UP (ref 7.32–7.43)
PH UR: 5.5 — SIGNIFICANT CHANGE UP (ref 5–8)
PH UR: 5.5 — SIGNIFICANT CHANGE UP (ref 5–8)
PLAT MORPH BLD: NORMAL — SIGNIFICANT CHANGE UP
PLAT MORPH BLD: NORMAL — SIGNIFICANT CHANGE UP
PLATELET # BLD AUTO: 201 K/UL — SIGNIFICANT CHANGE UP (ref 150–400)
PLATELET # BLD AUTO: 201 K/UL — SIGNIFICANT CHANGE UP (ref 150–400)
PO2 BLDV: 24 MMHG — LOW (ref 25–45)
PO2 BLDV: 24 MMHG — LOW (ref 25–45)
POTASSIUM BLDV-SCNC: 4.3 MMOL/L — SIGNIFICANT CHANGE UP (ref 3.5–5.1)
POTASSIUM BLDV-SCNC: 4.3 MMOL/L — SIGNIFICANT CHANGE UP (ref 3.5–5.1)
POTASSIUM SERPL-MCNC: 4.2 MMOL/L — SIGNIFICANT CHANGE UP (ref 3.5–5.3)
POTASSIUM SERPL-MCNC: 4.2 MMOL/L — SIGNIFICANT CHANGE UP (ref 3.5–5.3)
POTASSIUM SERPL-MCNC: 7 MMOL/L — CRITICAL HIGH (ref 3.5–5.3)
POTASSIUM SERPL-MCNC: 7 MMOL/L — CRITICAL HIGH (ref 3.5–5.3)
POTASSIUM SERPL-SCNC: 4.2 MMOL/L — SIGNIFICANT CHANGE UP (ref 3.5–5.3)
POTASSIUM SERPL-SCNC: 4.2 MMOL/L — SIGNIFICANT CHANGE UP (ref 3.5–5.3)
POTASSIUM SERPL-SCNC: 7 MMOL/L — CRITICAL HIGH (ref 3.5–5.3)
POTASSIUM SERPL-SCNC: 7 MMOL/L — CRITICAL HIGH (ref 3.5–5.3)
PROT SERPL-MCNC: 6.1 G/DL — SIGNIFICANT CHANGE UP (ref 6–8.3)
PROT SERPL-MCNC: 6.1 G/DL — SIGNIFICANT CHANGE UP (ref 6–8.3)
PROT SERPL-MCNC: 7 G/DL — SIGNIFICANT CHANGE UP (ref 6–8.3)
PROT SERPL-MCNC: 7 G/DL — SIGNIFICANT CHANGE UP (ref 6–8.3)
PROT UR-MCNC: NEGATIVE MG/DL — SIGNIFICANT CHANGE UP
PROT UR-MCNC: NEGATIVE MG/DL — SIGNIFICANT CHANGE UP
PROTHROM AB SERPL-ACNC: 18.7 SEC — HIGH (ref 9.5–13)
PROTHROM AB SERPL-ACNC: 18.7 SEC — HIGH (ref 9.5–13)
RBC # BLD: 3.67 M/UL — LOW (ref 4.2–5.8)
RBC # BLD: 3.67 M/UL — LOW (ref 4.2–5.8)
RBC # FLD: 15.5 % — HIGH (ref 10.3–14.5)
RBC # FLD: 15.5 % — HIGH (ref 10.3–14.5)
RBC BLD AUTO: SIGNIFICANT CHANGE UP
RBC BLD AUTO: SIGNIFICANT CHANGE UP
RSV RNA NPH QL NAA+NON-PROBE: SIGNIFICANT CHANGE UP
RSV RNA NPH QL NAA+NON-PROBE: SIGNIFICANT CHANGE UP
SAO2 % BLDV: 25.4 % — LOW (ref 67–88)
SAO2 % BLDV: 25.4 % — LOW (ref 67–88)
SARS-COV-2 RNA SPEC QL NAA+PROBE: SIGNIFICANT CHANGE UP
SARS-COV-2 RNA SPEC QL NAA+PROBE: SIGNIFICANT CHANGE UP
SODIUM SERPL-SCNC: 134 MMOL/L — LOW (ref 135–145)
SODIUM SERPL-SCNC: 134 MMOL/L — LOW (ref 135–145)
SODIUM SERPL-SCNC: 140 MMOL/L — SIGNIFICANT CHANGE UP (ref 135–145)
SODIUM SERPL-SCNC: 140 MMOL/L — SIGNIFICANT CHANGE UP (ref 135–145)
SP GR SPEC: 1.01 — SIGNIFICANT CHANGE UP (ref 1–1.03)
SP GR SPEC: 1.01 — SIGNIFICANT CHANGE UP (ref 1–1.03)
TROPONIN T, HIGH SENSITIVITY RESULT: 101 NG/L — HIGH (ref 0–51)
TROPONIN T, HIGH SENSITIVITY RESULT: 101 NG/L — HIGH (ref 0–51)
TROPONIN T, HIGH SENSITIVITY RESULT: 91 NG/L — HIGH (ref 0–51)
TROPONIN T, HIGH SENSITIVITY RESULT: 91 NG/L — HIGH (ref 0–51)
UROBILINOGEN FLD QL: 0.2 MG/DL — SIGNIFICANT CHANGE UP (ref 0.2–1)
UROBILINOGEN FLD QL: 0.2 MG/DL — SIGNIFICANT CHANGE UP (ref 0.2–1)
WBC # BLD: 10.09 K/UL — SIGNIFICANT CHANGE UP (ref 3.8–10.5)
WBC # BLD: 10.09 K/UL — SIGNIFICANT CHANGE UP (ref 3.8–10.5)
WBC # FLD AUTO: 10.09 K/UL — SIGNIFICANT CHANGE UP (ref 3.8–10.5)
WBC # FLD AUTO: 10.09 K/UL — SIGNIFICANT CHANGE UP (ref 3.8–10.5)

## 2023-11-16 PROCEDURE — 99285 EMERGENCY DEPT VISIT HI MDM: CPT

## 2023-11-16 PROCEDURE — 93015 CV STRESS TEST SUPVJ I&R: CPT | Mod: PD

## 2023-11-16 PROCEDURE — 78452 HT MUSCLE IMAGE SPECT MULT: CPT | Mod: PD

## 2023-11-16 PROCEDURE — A9500: CPT | Mod: PD

## 2023-11-16 PROCEDURE — 99223 1ST HOSP IP/OBS HIGH 75: CPT | Mod: GC

## 2023-11-16 PROCEDURE — 93010 ELECTROCARDIOGRAM REPORT: CPT | Mod: 76

## 2023-11-16 PROCEDURE — 93308 TTE F-UP OR LMTD: CPT | Mod: 26

## 2023-11-16 PROCEDURE — 71045 X-RAY EXAM CHEST 1 VIEW: CPT | Mod: 26

## 2023-11-16 RX ORDER — APIXABAN 2.5 MG/1
5 TABLET, FILM COATED ORAL
Refills: 0 | Status: DISCONTINUED | OUTPATIENT
Start: 2023-11-16 | End: 2023-11-20

## 2023-11-16 RX ORDER — DEXTROSE 50 % IN WATER 50 %
12.5 SYRINGE (ML) INTRAVENOUS ONCE
Refills: 0 | Status: DISCONTINUED | OUTPATIENT
Start: 2023-11-16 | End: 2023-11-22

## 2023-11-16 RX ORDER — ALLOPURINOL 300 MG
100 TABLET ORAL DAILY
Refills: 0 | Status: DISCONTINUED | OUTPATIENT
Start: 2023-11-16 | End: 2023-11-22

## 2023-11-16 RX ORDER — METOPROLOL TARTRATE 50 MG
25 TABLET ORAL DAILY
Refills: 0 | Status: DISCONTINUED | OUTPATIENT
Start: 2023-11-16 | End: 2023-11-19

## 2023-11-16 RX ORDER — DEXTROSE 50 % IN WATER 50 %
15 SYRINGE (ML) INTRAVENOUS ONCE
Refills: 0 | Status: DISCONTINUED | OUTPATIENT
Start: 2023-11-16 | End: 2023-11-22

## 2023-11-16 RX ORDER — PANTOPRAZOLE SODIUM 20 MG/1
40 TABLET, DELAYED RELEASE ORAL
Refills: 0 | Status: DISCONTINUED | OUTPATIENT
Start: 2023-11-16 | End: 2023-11-22

## 2023-11-16 RX ORDER — CLOPIDOGREL BISULFATE 75 MG/1
75 TABLET, FILM COATED ORAL DAILY
Refills: 0 | Status: DISCONTINUED | OUTPATIENT
Start: 2023-11-16 | End: 2023-11-22

## 2023-11-16 RX ORDER — CEFTRIAXONE 500 MG/1
1000 INJECTION, POWDER, FOR SOLUTION INTRAMUSCULAR; INTRAVENOUS ONCE
Refills: 0 | Status: COMPLETED | OUTPATIENT
Start: 2023-11-16 | End: 2023-11-16

## 2023-11-16 RX ORDER — REGADENOSON 0.08 MG/ML
0.4 INJECTION, SOLUTION INTRAVENOUS
Qty: 1 | Refills: 0 | Status: COMPLETED | OUTPATIENT
Start: 2023-11-16

## 2023-11-16 RX ORDER — SODIUM CHLORIDE 9 MG/ML
1000 INJECTION, SOLUTION INTRAVENOUS
Refills: 0 | Status: DISCONTINUED | OUTPATIENT
Start: 2023-11-16 | End: 2023-11-22

## 2023-11-16 RX ORDER — CHOLECALCIFEROL (VITAMIN D3) 125 MCG
1 CAPSULE ORAL
Qty: 0 | Refills: 0 | DISCHARGE

## 2023-11-16 RX ORDER — SODIUM CHLORIDE 9 MG/ML
700 INJECTION, SOLUTION INTRAVENOUS ONCE
Refills: 0 | Status: COMPLETED | OUTPATIENT
Start: 2023-11-16 | End: 2023-11-16

## 2023-11-16 RX ORDER — GLUCAGON INJECTION, SOLUTION 0.5 MG/.1ML
1 INJECTION, SOLUTION SUBCUTANEOUS ONCE
Refills: 0 | Status: DISCONTINUED | OUTPATIENT
Start: 2023-11-16 | End: 2023-11-22

## 2023-11-16 RX ORDER — ONDANSETRON 8 MG/1
4 TABLET, FILM COATED ORAL EVERY 8 HOURS
Refills: 0 | Status: DISCONTINUED | OUTPATIENT
Start: 2023-11-16 | End: 2023-11-22

## 2023-11-16 RX ORDER — INSULIN LISPRO 100/ML
VIAL (ML) SUBCUTANEOUS
Refills: 0 | Status: DISCONTINUED | OUTPATIENT
Start: 2023-11-16 | End: 2023-11-22

## 2023-11-16 RX ORDER — DEXTROSE 50 % IN WATER 50 %
25 SYRINGE (ML) INTRAVENOUS ONCE
Refills: 0 | Status: DISCONTINUED | OUTPATIENT
Start: 2023-11-16 | End: 2023-11-22

## 2023-11-16 RX ORDER — ATORVASTATIN CALCIUM 80 MG/1
80 TABLET, FILM COATED ORAL AT BEDTIME
Refills: 0 | Status: DISCONTINUED | OUTPATIENT
Start: 2023-11-16 | End: 2023-11-22

## 2023-11-16 RX ORDER — LANOLIN ALCOHOL/MO/W.PET/CERES
3 CREAM (GRAM) TOPICAL AT BEDTIME
Refills: 0 | Status: DISCONTINUED | OUTPATIENT
Start: 2023-11-16 | End: 2023-11-22

## 2023-11-16 RX ORDER — ACETAMINOPHEN 500 MG
650 TABLET ORAL EVERY 6 HOURS
Refills: 0 | Status: DISCONTINUED | OUTPATIENT
Start: 2023-11-16 | End: 2023-11-22

## 2023-11-16 RX ORDER — SOTALOL HCL 120 MG
80 TABLET ORAL EVERY 12 HOURS
Refills: 0 | Status: DISCONTINUED | OUTPATIENT
Start: 2023-11-16 | End: 2023-11-22

## 2023-11-16 RX ADMIN — CEFTRIAXONE 100 MILLIGRAM(S): 500 INJECTION, POWDER, FOR SOLUTION INTRAMUSCULAR; INTRAVENOUS at 16:47

## 2023-11-16 RX ADMIN — APIXABAN 5 MILLIGRAM(S): 2.5 TABLET, FILM COATED ORAL at 19:41

## 2023-11-16 RX ADMIN — SODIUM CHLORIDE 700 MILLILITER(S): 9 INJECTION, SOLUTION INTRAVENOUS at 14:36

## 2023-11-16 RX ADMIN — Medication 80 MILLIGRAM(S): at 19:41

## 2023-11-16 RX ADMIN — REGADENOSON 0 MG/5ML: 0.08 INJECTION, SOLUTION INTRAVENOUS at 00:00

## 2023-11-16 RX ADMIN — ATORVASTATIN CALCIUM 80 MILLIGRAM(S): 80 TABLET, FILM COATED ORAL at 21:58

## 2023-11-16 NOTE — ED ADULT NURSE NOTE - OBJECTIVE STATEMENT
78 year old 78 year old male patient BIBA from doctors office for hypotension. Patient had a stress test today and was found to be hypotensive to 90s systolic afterward. Patient received 1300cc saline prior to transfer with some improvement with BP. Patient denies current dizziness, HA, CP, SOB, abd pain, n/v/d, fever, chills. Patient aware of plan of care for monitoring and likely admission.

## 2023-11-16 NOTE — H&P ADULT - PROBLEM SELECTOR PLAN 1
Unclear what patient's pressures were vijay-stress test, but he reports feelings of fatigue even prior to the test. Required 1.5L fluids reportedly after the study. Stress test itself reportedly negative  - doubt arrythmogenic or vagally mediated  - history suggest hypovolemia as cause for presentation- likely in setting of worsening appetite and concurrent diuretic use  - BP soft but acceptable at present, continue monitoring  - check orthostatics  - hold diuretics for now  - nutrition consult  - encouraged patient to eat regular meals as if they were medicine- he agrees    2. Type II MI vs. demand ischemia given elevated troponin  - suspect stress/dehydration mediated- low suspicion for ACS given negative stress test today vs. HF as pt has no prior history and last TTE 10/23 was WNL  - cardiology following- f/u recs  - can observe on tele x 24 hrs, then dc  - f/u repeat EKG as none in chart Unclear what patient's pressures were vijay-stress test, but he reports feelings of fatigue even prior to the test. Required 1.5L fluids reportedly after the study. Stress test itself reportedly negative  - doubt arrythmogenic or vagally mediated  - history suggest hypovolemia as cause for presentation- likely in setting of worsening appetite and concurrent diuretic use  - BP soft but acceptable at present, continue monitoring  - check orthostatics  - hold diuretics for now  - nutrition consult  - encouraged patient to eat regular meals as if they were medicine- he agrees    #. Type II MI vs. demand ischemia given elevated troponin  - suspect stress/dehydration mediated- low suspicion for ACS given negative stress test today vs. HF as pt has no prior history and last TTE 10/23 was WNL  - cardiology following- f/u recs  - can observe on tele x 24 hrs, then dc  - f/u repeat EKG as none in chart    # Probable CKD- fluctuates between Cr 1.1-1.4 as OP- CKD II/III. No sudhakar RAZIA at present  - cont monitoring, renally dose meds  - hold diuretics for now

## 2023-11-16 NOTE — ED PROVIDER NOTE - OBJECTIVE STATEMENT
78 M with hx of CAD s/p NURIA to distal LAD in 6/2023, HTN, HLD, EtOH disorder, T2DM, spinal surgeries, neuroendocrine tumor on Lutathera (first treatment on Friday 10/20 at [facility] [place] sent in by Cardiologist Dr. Hameed for hypotension after stress test today. Pt was noted to be systolic 60s after stress testing, received 1300ccs NS IV by EMS en route to ED w/improvement in BP to systolic 100s. Pt reports  generalized weakness, fatigue, poor p.o. intake for 1 week.   On Eliquis and Plavix.  Compliant with medications at home, no medication changes recently.  Denies recent fever/chills, HA, SOB, CP, ABD pain, N/V/D/C, numbness/tingling, dysuria/hematuria, hematochezia/melena, focal weakness.

## 2023-11-16 NOTE — H&P ADULT - NSHPADDITIONALINFOADULT_GEN_ALL_CORE
The necessity of the time spent during the encounter on this date of service was due to:   - Ordering, reviewing, and interpreting labs, testing, and imaging.  - Independently obtaining a review of systems and performing a physical exam  - Reviewing prior hospitalization and where necessary, outpatient records.  - Counselling and educating patient and family regarding interpretation of aforementioned items and plan of care.    Time-based billing (NON-critical care). Total minutes spent: 78

## 2023-11-16 NOTE — PATIENT PROFILE ADULT - FALL HARM RISK - HARM RISK INTERVENTIONS

## 2023-11-16 NOTE — H&P ADULT - PROBLEM SELECTOR PLAN 3
Pt states he had some sort of radio/embolization procedure recently after which his appetite diminished  - we discussed possible appetite stimulants including megace and marinol/dronabinol- including risks such as VTE and behavioral changes; pt opting to forego pharmacologic appetite stimulation  - plans to eat meals on scheduled basis as though they are medications  - nutrition consult

## 2023-11-16 NOTE — ED PROVIDER NOTE - PROGRESS NOTE DETAILS
trop elev 912 in settin gof mild juan cr 1.3  ekg nonischemic will call hardeep tavares nuc stress - delta trop sent pt no active cp -keep on tele, K elev but sever hemolysis will repeat cmp bp markedly improved after 2 liters - Soo Bhatt DO (PGY3): Patient signed out to me by day team.   EKG without ischemic changes.  Patient troponin 90 > 101.  Spoke with Dr. Hameed, would like admission for further cardiac work-up,  cardiology aware and has evaluated patient in the emergency department.   Spoke with hospitalist will admit to their service.

## 2023-11-16 NOTE — CONSULT NOTE ADULT - SUBJECTIVE AND OBJECTIVE BOX
DATE OF SERVICE: 11-16-23 @ 15:47    CHIEF COMPLAINT:Patient is a 78y old  Male who presents with a chief complaint of hypotension s/p NST.    HISTORY OF PRESENT ILLNESS: 78-year-old male multiple medical problems history of hepatocellular carcinoma status post radiation therapy, AF s/p DCCV on Eliquis who was found to be hypotensive following NST. Patient has reported general weakness, fatigue, lightheadedness since being discharged from hospital. Reports mild chest discomfort in midsternal region. Reports dyspnea with minimal exertion. Also reports significant lack of appetite and poor PO intake. No dark or bloody stool, nausea or vomiting.     PAST MEDICAL & SURGICAL HISTORY:  Hypertension      Hypercholesterolemia      PAD (peripheral artery disease)      Neuroendocrine carcinoma      S/P knee replacement, bilateral      S/P hip replacement  right hip      History of hernia repair      H/O laminectomy      History of lumbosacral spine surgery              MEDICATIONS:    cefTRIAXone   IVPB 1000 milliGRAM(s) IV Intermittent once                FAMILY HISTORY:      Non-contributory    SOCIAL HISTORY:    [ -] Tobacco  [ -] Drugs  [ -] Alcohol    Allergies    No Known Allergies    Intolerances    	    REVIEW OF SYSTEMS:  CONSTITUTIONAL: No fever, generalized weakness  EYES: No eye pain, visual disturbances, or discharge  ENMT:  No difficulty hearing, tinnitus  NECK: No pain or stiffness  RESPIRATORY: No cough, wheezing, + SOB  CARDIOVASCULAR: No chest pain, palpitations, passing out, dizziness, or leg swelling  GASTROINTESTINAL:  No nausea, vomiting, diarrhea or constipation. No melena.  GENITOURINARY: No dysuria, hematuria  NEUROLOGICAL: No stroke like symptoms  SKIN: No burning or lesions   ENDOCRINE: No heat or cold intolerance  MUSCULOSKELETAL: No joint pain or swelling  PSYCHIATRIC: No  anxiety, mood swings  HEME/LYMPH: No bleeding gums  ALLERGY AND IMMUNOLOGIC: No hives or eczema	    All other ROS negative    PHYSICAL EXAM:  T(C): 36.9 (11-16-23 @ 14:00), Max: 36.9 (11-16-23 @ 14:00)  HR: 82 (11-16-23 @ 15:15) (82 - 89)  BP: 111/43 (11-16-23 @ 15:15) (107/92 - 111/43)  RR: 22 (11-16-23 @ 15:15) (17 - 22)  SpO2: 100% (11-16-23 @ 15:15) (100% - 100%)  Wt(kg): --  I&O's Summary      Appearance: Normal	  HEENT:   Normal oral mucosa, EOMI	  Cardiovascular:  S1 S2, No JVD,    Respiratory: Lungs clear to auscultation	  Psychiatry: Alert  Gastrointestinal:  Soft, Non-tender, + BS	  Skin: No rashes   Neurologic: Non-focal  Extremities:  No edema  Vascular: Peripheral pulses palpable    	    	  	  CARDIAC MARKERS:  Labs personally reviewed by me                                  11.1   10.09 )-----------( 201      ( 16 Nov 2023 14:16 )             34.2     11-16    134<L>  |  98  |  37<H>  ----------------------------<  122<H>  7.0<HH>   |  23  |  1.38<H>    Ca    8.6      16 Nov 2023 14:16    TPro  7.0  /  Alb  2.9<L>  /  TBili  0.5  /  DBili  x   /  AST  85<H>  /  ALT  41  /  AlkPhos  176<H>  11-16          EKG: Personally reviewed by me -   Radiology: Personally reviewed by me -     < from: POCUS ED TTE 2D F/U, Limited w/o Cont. (11.16.23 @ 15:44) >  INTERPRETATION:  A focused transthoracic cardiac ultrasound examination   was performed.  No pericardial effusion was present.  No global wall motion abnormality was identified  NO RV strain  IVC was non plethoric  juan predominance  No obvious ascites  Stones noted within bladder .    IMPRESSION:  No Pericardial Effusion.    < end of copied text >  < from: TTE W or WO Ultrasound Enhancing Agent (10.25.23 @ 08:30) >      1. Left ventricular cavity is normal.Left ventricular wall thickness is mildly increased. Left ventricular systolic function is normal with an ejection fraction of 64 % by Avila's method of disks. There are no regional wall motion abnormalities seen.   2. Normal left ventricular diastolic function, with normal filling pressure.   3. Normal right ventricular cavity size, wall thickness, and systolic function.   4. The left atrium is moderately dilated in size.   5. No significant valvular disease.   6. No pericardial effusion seen.   7. No prior echocardiogram is available for comparison.    < end of copied text >  < from: Cardiac Catheterization (06.23.23 @ 11:01) >  Conclusions:   Successful PCI with NURIA to the distal LAD.  DAPT for 6 mths     < end of copied text >      Assessment /Plan:     78-year-old male multiple medical problems history of hepatocellular carcinoma status post radiation therapy, AF s/p DCCV on Eliquis who was found to be hypotensive following NST. Patient has reported general weakness, fatigue, lightheadedness since being discharged from hospital. Reports mild chest discomfort in midsternal region. Reports dyspnea with minimal exertion. Also reports significant lack of appetite and poor PO intake. No dark or bloody stool, nausea or vomiting.       Problem/Plan - 1:  ·  Problem: Hypotension  - Occurred s/p NST (no exercise component conducted)--> NST normal study as per Dr. Hameed  - Trop 91 --> lower than previous admission   - BUN/Cr elevated  - BP now recovering with fluid resuscitation. Patient reports significantly poor appetite and decreased PO intake since hospital discharge.  - Hypotension likely 2/2 hypovolemia.  - Follow up urine and blood cultures.     Problem/Plan - 2:  ·  Problem: Chest Pain  ·  Plan: Reported as mild intensity, midsternal, non-exertional and self resolves on its own.  - Trop 91 (similar to prior admission)  - Hx of recent PCI  - TTE from 10/25 shows preserved EF and no WMA  - NST 11/16 wnl  - CP unlikely cardiac in etiology     Problem/Plan - 3:  ·  Problem: Shortness of Breath  ·  Plan: ECG non-ischemic  - Prior TTE wnl.   - CXR shows clear lungs  - Patient was not discharged on his usual Torsemide following previous hospital admission d/t RAZIA.   - Check BNP     Problem/Plan - 4:  ·  Problem: CAD.   ·  Plan: Recent PCI to pLAD in June 2023  - ECG non-ischemic  - Trop as noted above  - c/w Plavix and statin     Problem/Plan - 5:  ·  Problem: RAZIA   ·  Plan: RAZIA possibly pre-renal d/t poor intake.   - Cont to hold spironolactone also i/s/o hyperkalemia  - s/p IVF  - Avoid nephrotoxic agents     Problem/Plan - 5:  ·  Problem: Atrial Fibrillation  - s/p succesful DCCV 10/31  - Cont Eliquis 5mg BID  - Cont Toprol 25mg PO daily  - C/w of Sotalol 80mg PO BID --> will check qTC    Differential diagnosis and plan of care discussed with patient after the evaluation. Counseling on diet, nutritional counseling, weight management, exercise and medication compliance was done.   Advanced care planning/advanced directives discussed with patient/family. DNR status including forceful chest compressions to attempt to restart the heart, ventilator support/artificial breathing, electric shock, artificial nutrition, health care proxy, Molst form all discussed with pt. Pt wishes to consider. More than fifteen minutes spent on discussing advanced directives.     Sulma Espinosa [facility]  Hank Hayes DO [facility]  Cardiovascular Medicine  [address] [place] [address] Dr, [address]  Available for call or text via [employer]  Office [phone]   DATE OF SERVICE: 11-16-23 @ 15:47    CHIEF COMPLAINT:Patient is a 78y old  Male who presents with a chief complaint of hypotension s/p NST.    HISTORY OF PRESENT ILLNESS: 78-year-old male multiple medical problems history of hepatocellular carcinoma status post radiation therapy, AF s/p DCCV on Eliquis who was found to be hypotensive following NST. Patient has reported general weakness, fatigue, lightheadedness since being discharged from hospital. Reports mild chest discomfort in midsternal region. Reports dyspnea with minimal exertion. Also reports significant lack of appetite and poor PO intake. No dark or bloody stool, nausea or vomiting.     PAST MEDICAL & SURGICAL HISTORY:  Hypertension      Hypercholesterolemia      PAD (peripheral artery disease)      Neuroendocrine carcinoma      S/P knee replacement, bilateral      S/P hip replacement  right hip      History of hernia repair      H/O laminectomy      History of lumbosacral spine surgery              MEDICATIONS:    cefTRIAXone   IVPB 1000 milliGRAM(s) IV Intermittent once                FAMILY HISTORY:      Non-contributory    SOCIAL HISTORY:    [ -] Tobacco  [ -] Drugs  [ -] Alcohol    Allergies    No Known Allergies    Intolerances    	    REVIEW OF SYSTEMS:  CONSTITUTIONAL: No fever, generalized weakness  EYES: No eye pain, visual disturbances, or discharge  ENMT:  No difficulty hearing, tinnitus  NECK: No pain or stiffness  RESPIRATORY: No cough, wheezing, + SOB  CARDIOVASCULAR: No chest pain, palpitations, passing out, dizziness, or leg swelling  GASTROINTESTINAL:  No nausea, vomiting, diarrhea or constipation. No melena.  GENITOURINARY: No dysuria, hematuria  NEUROLOGICAL: No stroke like symptoms  SKIN: No burning or lesions   ENDOCRINE: No heat or cold intolerance  MUSCULOSKELETAL: No joint pain or swelling  PSYCHIATRIC: No  anxiety, mood swings  HEME/LYMPH: No bleeding gums  ALLERGY AND IMMUNOLOGIC: No hives or eczema	    All other ROS negative    PHYSICAL EXAM:  T(C): 36.9 (11-16-23 @ 14:00), Max: 36.9 (11-16-23 @ 14:00)  HR: 82 (11-16-23 @ 15:15) (82 - 89)  BP: 111/43 (11-16-23 @ 15:15) (107/92 - 111/43)  RR: 22 (11-16-23 @ 15:15) (17 - 22)  SpO2: 100% (11-16-23 @ 15:15) (100% - 100%)  Wt(kg): --  I&O's Summary      Appearance: Normal	  HEENT:   Normal oral mucosa, EOMI	  Cardiovascular:  S1 S2, No JVD,    Respiratory: Lungs clear to auscultation	  Psychiatry: Alert  Gastrointestinal:  Soft, Non-tender, + BS	  Skin: No rashes   Neurologic: Non-focal  Extremities:  No edema  Vascular: Peripheral pulses palpable    	    	  	  CARDIAC MARKERS:  Labs personally reviewed by me                                  11.1   10.09 )-----------( 201      ( 16 Nov 2023 14:16 )             34.2     11-16    134<L>  |  98  |  37<H>  ----------------------------<  122<H>  7.0<HH>   |  23  |  1.38<H>    Ca    8.6      16 Nov 2023 14:16    TPro  7.0  /  Alb  2.9<L>  /  TBili  0.5  /  DBili  x   /  AST  85<H>  /  ALT  41  /  AlkPhos  176<H>  11-16          EKG: Personally reviewed by me -   Radiology: Personally reviewed by me -     < from: POCUS ED TTE 2D F/U, Limited w/o Cont. (11.16.23 @ 15:44) >  INTERPRETATION:  A focused transthoracic cardiac ultrasound examination   was performed.  No pericardial effusion was present.  No global wall motion abnormality was identified  NO RV strain  IVC was non plethoric  juan predominance  No obvious ascites  Stones noted within bladder .    IMPRESSION:  No Pericardial Effusion.    < end of copied text >  < from: TTE W or WO Ultrasound Enhancing Agent (10.25.23 @ 08:30) >      1. Left ventricular cavity is normal.Left ventricular wall thickness is mildly increased. Left ventricular systolic function is normal with an ejection fraction of 64 % by Avila's method of disks. There are no regional wall motion abnormalities seen.   2. Normal left ventricular diastolic function, with normal filling pressure.   3. Normal right ventricular cavity size, wall thickness, and systolic function.   4. The left atrium is moderately dilated in size.   5. No significant valvular disease.   6. No pericardial effusion seen.   7. No prior echocardiogram is available for comparison.    < end of copied text >  < from: Cardiac Catheterization (06.23.23 @ 11:01) >  Conclusions:   Successful PCI with NURIA to the distal LAD.  DAPT for 6 mths     < end of copied text >      Assessment /Plan:     78-year-old male multiple medical problems history of hepatocellular carcinoma status post radiation therapy, AF s/p DCCV on Eliquis who was found to be hypotensive following NST. Patient has reported general weakness, fatigue, lightheadedness since being discharged from hospital. Reports mild chest discomfort in midsternal region. Reports dyspnea with minimal exertion. Also reports significant lack of appetite and poor PO intake. No dark or bloody stool, nausea or vomiting.       Problem/Plan - 1:  ·  Problem: Hypotension  - Occurred s/p NST (no exercise component conducted)--> NST normal study as per Dr. Hameed  - Trop 91 --> lower than previous admission   - BUN/Cr elevated  - BP now recovering with fluid resuscitation. Patient reports significantly poor appetite and decreased PO intake since hospital discharge.  - Hypotension likely 2/2 hypovolemia.  - Follow up urine and blood cultures.     Problem/Plan - 2:  ·  Problem: Chest Pain  ·  Plan: Reported as mild intensity, midsternal, non-exertional and self resolves on its own.  - Trop 91 (similar to prior admission)  - Hx of recent PCI  - TTE from 10/25 shows preserved EF and no WMA  - NST 11/16 wnl  - CP unlikely cardiac in etiology     Problem/Plan - 3:  ·  Problem: Shortness of Breath  ·  Plan: ECG non-ischemic  - Prior TTE wnl.   - CXR shows clear lungs  - Patient was not discharged on his usual Torsemide following previous hospital admission d/t RAZIA.   - Check BNP     Problem/Plan - 4:  ·  Problem: CAD.   ·  Plan: Recent PCI to pLAD in June 2023  - ECG non-ischemic  - Trop as noted above  - c/w Plavix and statin     Problem/Plan - 5:  ·  Problem: RAZIA   ·  Plan: RAZIA possibly pre-renal d/t poor intake.   - Cont to hold spironolactone also i/s/o hyperkalemia  - s/p IVF  - Avoid nephrotoxic agents     Problem/Plan - 6:  ·  Problem: Atrial Fibrillation  - s/p succesful DCCV 10/31  - Cont Eliquis 5mg BID  - Cont Toprol 25mg PO daily  - C/w of Sotalol 80mg PO BID --> will check qTC     Problem/Plan - 7:  ·  Problem: Hyperkalemia  - Repeat labs to confirm   - hold spironolactone    Differential diagnosis and plan of care discussed with patient after the evaluation. Counseling on diet, nutritional counseling, weight management, exercise and medication compliance was done.   Advanced care planning/advanced directives discussed with patient/family. DNR status including forceful chest compressions to attempt to restart the heart, ventilator support/artificial breathing, electric shock, artificial nutrition, health care proxy, Molst form all discussed with pt. Pt wishes to consider. More than fifteen minutes spent on discussing advanced directives.     Sulma Espinosa [facility]  Hank Hayes DO [facility]  Cardiovascular Medicine  [address] [place] [address] Dr, [address]  Available for call or text via [employer]  Office [phone]   DATE OF SERVICE: 11-16-23 @ 15:47    CHIEF COMPLAINT:Patient is a 78y old  Male who presents with a chief complaint of hypotension s/p NST.    HISTORY OF PRESENT ILLNESS: 78-year-old male multiple medical problems history of hepatocellular carcinoma status post radiation therapy, AF s/p DCCV on Eliquis who was found to be hypotensive following NST. Patient has reported general weakness, fatigue, lightheadedness since being discharged from hospital. Reports mild chest discomfort in midsternal region. Reports dyspnea with minimal exertion. Also reports significant lack of appetite and poor PO intake. No dark or bloody stool, nausea or vomiting.     PAST MEDICAL & SURGICAL HISTORY:  Hypertension      Hypercholesterolemia      PAD (peripheral artery disease)      Neuroendocrine carcinoma      S/P knee replacement, bilateral      S/P hip replacement  right hip      History of hernia repair      H/O laminectomy      History of lumbosacral spine surgery              MEDICATIONS:    cefTRIAXone   IVPB 1000 milliGRAM(s) IV Intermittent once                FAMILY HISTORY:      Non-contributory    SOCIAL HISTORY:    [ -] Tobacco  [ -] Drugs  [ -] Alcohol    Allergies    No Known Allergies    Intolerances    	    REVIEW OF SYSTEMS:  CONSTITUTIONAL: No fever, generalized weakness  EYES: No eye pain, visual disturbances, or discharge  ENMT:  No difficulty hearing, tinnitus  NECK: No pain or stiffness  RESPIRATORY: No cough, wheezing, + SOB  CARDIOVASCULAR: No chest pain, palpitations, passing out, dizziness, or leg swelling  GASTROINTESTINAL:  No nausea, vomiting, diarrhea or constipation. No melena.  GENITOURINARY: No dysuria, hematuria  NEUROLOGICAL: No stroke like symptoms  SKIN: No burning or lesions   ENDOCRINE: No heat or cold intolerance  MUSCULOSKELETAL: No joint pain or swelling  PSYCHIATRIC: No  anxiety, mood swings  HEME/LYMPH: No bleeding gums  ALLERGY AND IMMUNOLOGIC: No hives or eczema	    All other ROS negative    PHYSICAL EXAM:  T(C): 36.9 (11-16-23 @ 14:00), Max: 36.9 (11-16-23 @ 14:00)  HR: 82 (11-16-23 @ 15:15) (82 - 89)  BP: 111/43 (11-16-23 @ 15:15) (107/92 - 111/43)  RR: 22 (11-16-23 @ 15:15) (17 - 22)  SpO2: 100% (11-16-23 @ 15:15) (100% - 100%)  Wt(kg): --  I&O's Summary      Appearance: Normal	  HEENT:   Normal oral mucosa, EOMI	  Cardiovascular:  S1 S2, No JVD,    Respiratory: Lungs clear to auscultation	  Psychiatry: Alert  Gastrointestinal:  Soft, Non-tender, + BS	  Skin: No rashes   Neurologic: Non-focal  Extremities:  No edema  Vascular: Peripheral pulses palpable    	    	  	  CARDIAC MARKERS:  Labs personally reviewed by me                                  11.1   10.09 )-----------( 201      ( 16 Nov 2023 14:16 )             34.2     11-16    134<L>  |  98  |  37<H>  ----------------------------<  122<H>  7.0<HH>   |  23  |  1.38<H>    Ca    8.6      16 Nov 2023 14:16    TPro  7.0  /  Alb  2.9<L>  /  TBili  0.5  /  DBili  x   /  AST  85<H>  /  ALT  41  /  AlkPhos  176<H>  11-16          EKG: Personally reviewed by me - SR with PACs  Radiology: Personally reviewed by me -     < from: POCUS ED TTE 2D F/U, Limited w/o Cont. (11.16.23 @ 15:44) >  INTERPRETATION:  A focused transthoracic cardiac ultrasound examination   was performed.  No pericardial effusion was present.  No global wall motion abnormality was identified  NO RV strain  IVC was non plethoric  juan predominance  No obvious ascites  Stones noted within bladder .    IMPRESSION:  No Pericardial Effusion.    < end of copied text >  < from: TTE W or WO Ultrasound Enhancing Agent (10.25.23 @ 08:30) >      1. Left ventricular cavity is normal.Left ventricular wall thickness is mildly increased. Left ventricular systolic function is normal with an ejection fraction of 64 % by Avila's method of disks. There are no regional wall motion abnormalities seen.   2. Normal left ventricular diastolic function, with normal filling pressure.   3. Normal right ventricular cavity size, wall thickness, and systolic function.   4. The left atrium is moderately dilated in size.   5. No significant valvular disease.   6. No pericardial effusion seen.   7. No prior echocardiogram is available for comparison.    < end of copied text >  < from: Cardiac Catheterization (06.23.23 @ 11:01) >  Conclusions:   Successful PCI with NURIA to the distal LAD.  DAPT for 6 mths     < end of copied text >      Assessment /Plan:     78-year-old male multiple medical problems history of hepatocellular carcinoma status post radiation therapy, AF s/p DCCV on Eliquis who was found to be hypotensive following NST. Patient has reported general weakness, fatigue, lightheadedness since being discharged from hospital. Reports mild chest discomfort in midsternal region. Reports dyspnea with minimal exertion. Also reports significant lack of appetite and poor PO intake. No dark or bloody stool, nausea or vomiting.       Problem/Plan - 1:  ·  Problem: Hypotension  - Occurred s/p NST (no exercise component conducted)--> NST normal study as per Dr. Hameed  - Trop 91 --> lower than previous admission   - BUN/Cr elevated  - BP now recovering with fluid resuscitation. Patient reports significantly poor appetite and decreased PO intake since hospital discharge.  - Hypotension likely 2/2 hypovolemia.  - Follow up urine and blood cultures.     Problem/Plan - 2:  ·  Problem: Chest Pain  ·  Plan: Reported as mild intensity, midsternal, non-exertional and self resolves on its own.  - Trop 91 (similar to prior admission)  - Hx of recent PCI  - TTE from 10/25 shows preserved EF and no WMA  - NST 11/16 wnl  - CP unlikely cardiac in etiology     Problem/Plan - 3:  ·  Problem: Shortness of Breath  ·  Plan: ECG non-ischemic  - Prior TTE wnl.   - CXR shows clear lungs  - Patient was not discharged on his usual Torsemide following previous hospital admission d/t RAZIA.   - Check BNP     Problem/Plan - 4:  ·  Problem: CAD.   ·  Plan: Recent PCI to pLAD in June 2023  - ECG non-ischemic  - Trop as noted above  - c/w Plavix and statin     Problem/Plan - 5:  ·  Problem: RAZIA   ·  Plan: RAZIA possibly pre-renal d/t poor intake.   - Cont to hold spironolactone also i/s/o hyperkalemia  - Was restarted on Torsemide 40mg PO BID as OP -- cont to hold  - s/p IVF  - Avoid nephrotoxic agents     Problem/Plan - 6:  ·  Problem: Atrial Fibrillation  - s/p succesful DCCV 10/31  - Cont Eliquis 5mg BID  - Cont Toprol 25mg PO daily  - C/w of Sotalol 80mg PO BID --> will check qTC     Problem/Plan - 7:  ·  Problem: Hyperkalemia  - Repeat labs to confirm   - hold spironolactone    Differential diagnosis and plan of care discussed with patient after the evaluation. Counseling on diet, nutritional counseling, weight management, exercise and medication compliance was done.   Advanced care planning/advanced directives discussed with patient/family. DNR status including forceful chest compressions to attempt to restart the heart, ventilator support/artificial breathing, electric shock, artificial nutrition, health care proxy, Molst form all discussed with pt. Pt wishes to consider. More than fifteen minutes spent on discussing advanced directives.     Sulma Espinosa [facility]  Hank Hayes DO [facility]  Cardiovascular Medicine  [address] [place] [address] Dr, [address]  Available for call or text via [employer]  Office [phone]   DATE OF SERVICE: 11-16-23 @ 15:47    CHIEF COMPLAINT:Patient is a 78y old  Male who presents with a chief complaint of hypotension s/p NST.    HISTORY OF PRESENT ILLNESS: 78-year-old male multiple medical problems history of hepatocellular carcinoma status post radiation therapy, AF s/p DCCV on Eliquis who was found to be hypotensive following NST. Patient has reported general weakness, fatigue, lightheadedness since being discharged from hospital. Reports mild chest discomfort in midsternal region. Reports dyspnea with minimal exertion. Also reports significant lack of appetite and poor PO intake. No dark or bloody stool, nausea or vomiting.     PAST MEDICAL & SURGICAL HISTORY:  Hypertension      Hypercholesterolemia      PAD (peripheral artery disease)      Neuroendocrine carcinoma      S/P knee replacement, bilateral      S/P hip replacement  right hip      History of hernia repair      H/O laminectomy      History of lumbosacral spine surgery              MEDICATIONS:    cefTRIAXone   IVPB 1000 milliGRAM(s) IV Intermittent once                FAMILY HISTORY:      Non-contributory    SOCIAL HISTORY:    [ -] Tobacco  [ -] Drugs  [ -] Alcohol    Allergies    No Known Allergies    Intolerances    	    REVIEW OF SYSTEMS:  CONSTITUTIONAL: No fever, generalized weakness  EYES: No eye pain, visual disturbances, or discharge  ENMT:  No difficulty hearing, tinnitus  NECK: No pain or stiffness  RESPIRATORY: No cough, wheezing, + SOB  CARDIOVASCULAR: No chest pain, palpitations, passing out, dizziness, or leg swelling  GASTROINTESTINAL:  No nausea, vomiting, diarrhea or constipation. No melena.  GENITOURINARY: No dysuria, hematuria  NEUROLOGICAL: No stroke like symptoms  SKIN: No burning or lesions   ENDOCRINE: No heat or cold intolerance  MUSCULOSKELETAL: No joint pain or swelling  PSYCHIATRIC: No  anxiety, mood swings  HEME/LYMPH: No bleeding gums  ALLERGY AND IMMUNOLOGIC: No hives or eczema	    All other ROS negative    PHYSICAL EXAM:  T(C): 36.9 (11-16-23 @ 14:00), Max: 36.9 (11-16-23 @ 14:00)  HR: 82 (11-16-23 @ 15:15) (82 - 89)  BP: 111/43 (11-16-23 @ 15:15) (107/92 - 111/43)  RR: 22 (11-16-23 @ 15:15) (17 - 22)  SpO2: 100% (11-16-23 @ 15:15) (100% - 100%)  Wt(kg): --  I&O's Summary      Appearance: Normal	  HEENT:   Normal oral mucosa, EOMI	  Cardiovascular:  S1 S2, No JVD,    Respiratory: Lungs clear to auscultation	  Psychiatry: Alert  Gastrointestinal:  Soft, Non-tender, + BS	  Skin: No rashes   Neurologic: Non-focal  Extremities:  No edema  Vascular: Peripheral pulses palpable    	    	  	  CARDIAC MARKERS:  Labs personally reviewed by me                                  11.1   10.09 )-----------( 201      ( 16 Nov 2023 14:16 )             34.2     11-16    134<L>  |  98  |  37<H>  ----------------------------<  122<H>  7.0<HH>   |  23  |  1.38<H>    Ca    8.6      16 Nov 2023 14:16    TPro  7.0  /  Alb  2.9<L>  /  TBili  0.5  /  DBili  x   /  AST  85<H>  /  ALT  41  /  AlkPhos  176<H>  11-16          EKG: Personally reviewed by me - SR with PACs  Radiology: Personally reviewed by me -     < from: POCUS ED TTE 2D F/U, Limited w/o Cont. (11.16.23 @ 15:44) >  INTERPRETATION:  A focused transthoracic cardiac ultrasound examination   was performed.  No pericardial effusion was present.  No global wall motion abnormality was identified  NO RV strain  IVC was non plethoric  juan predominance  No obvious ascites  Stones noted within bladder .    IMPRESSION:  No Pericardial Effusion.    < end of copied text >  < from: TTE W or WO Ultrasound Enhancing Agent (10.25.23 @ 08:30) >      1. Left ventricular cavity is normal.Left ventricular wall thickness is mildly increased. Left ventricular systolic function is normal with an ejection fraction of 64 % by Avila's method of disks. There are no regional wall motion abnormalities seen.   2. Normal left ventricular diastolic function, with normal filling pressure.   3. Normal right ventricular cavity size, wall thickness, and systolic function.   4. The left atrium is moderately dilated in size.   5. No significant valvular disease.   6. No pericardial effusion seen.   7. No prior echocardiogram is available for comparison.    < end of copied text >  < from: Cardiac Catheterization (06.23.23 @ 11:01) >  Conclusions:   Successful PCI with NURIA to the distal LAD.  DAPT for 6 mths     < end of copied text >      Assessment /Plan:     78-year-old male multiple medical problems history of hepatocellular carcinoma status post radiation therapy, AF s/p DCCV on Eliquis who was found to be hypotensive following NST. Patient has reported general weakness, fatigue, lightheadedness since being discharged from hospital. Reports mild chest discomfort in midsternal region. Reports dyspnea with minimal exertion. Also reports significant lack of appetite and poor PO intake. No dark or bloody stool, nausea or vomiting.       Problem/Plan - 1:  ·  Problem: Hypotension  - Occurred s/p NST (no exercise component conducted)--> NST normal study as per Dr. Hameed  - Trop 91 --> lower than previous admission   - BUN/Cr elevated  - BP now recovering with fluid resuscitation. Patient reports significantly poor appetite and decreased PO intake since hospital discharge.  - Hypotension likely 2/2 hypovolemia.  - Follow up urine and blood cultures.     Problem/Plan - 2:  ·  Problem: Chest Pain  ·  Plan: Reported as mild intensity, midsternal, non-exertional and self resolves on its own.  - Trop 91 (similar to prior admission)  - Hx of recent PCI  - TTE from 10/25 shows preserved EF and no WMA  - NST 11/16 wnl  - CP unlikely cardiac in etiology     Problem/Plan - 3:  ·  Problem: Shortness of Breath  ·  Plan: ECG non-ischemic  - Prior TTE wnl.   - CXR shows clear lungs  - Patient was not discharged on his usual Torsemide following previous hospital admission d/t RAZIA.   - Check BNP     Problem/Plan - 4:  ·  Problem: CAD.   ·  Plan: Recent PCI to pLAD in June 2023  - ECG non-ischemic  - Trop as noted above  - c/w Plavix and statin     Problem/Plan - 5:  ·  Problem: RAZIA   ·  Plan: RAZIA possibly pre-renal d/t poor intake.   - Cont to hold spironolactone also i/s/o hyperkalemia  - Was restarted on Torsemide 40mg PO BID as OP -- cont to hold  - s/p IVF     Problem/Plan - 6:  ·  Problem: Atrial Fibrillation  - s/p succesful DCCV 10/31  - Cont Eliquis 5mg BID  - Cont Toprol 25mg PO daily  - C/w of Sotalol 80mg PO BID --> will check qTC             Differential diagnosis and plan of care discussed with patient after the evaluation. Counseling on diet, nutritional counseling, weight management, exercise and medication compliance was done.   Advanced care planning/advanced directives discussed with patient/family. DNR status including forceful chest compressions to attempt to restart the heart, ventilator support/artificial breathing, electric shock, artificial nutrition, health care proxy, Molst form all discussed with pt. Pt wishes to consider. More than fifteen minutes spent on discussing advanced directives.     Sulma Espinosa [facility]  Hank Hayes DO [facility]  Cardiovascular Medicine  [address] [place] [address] Dr, [address]  Available for call or text via [employer] TE[employer]  Office [phone]

## 2023-11-16 NOTE — H&P ADULT - PROBLEM SELECTOR PLAN 5
On torsemide which was recently stopped, as well as aldactone. Currently has trace to no edema B/L  - no HF hx- will hold diuretics for now  - pt reports recent LE duplex which was negative in the last month, low suspicion for DVT On torsemide which was recently stopped, as well as aldactone. Currently has trace to no edema B/L  - no HF hx- will hold diuretics for now  - pt reports recent LE duplex which was negative in the last month, low suspicion for DVT      dispo: likely home tomorrow pending overnight observation, orthostatics, cards clearance  d/w and agreed on by pt and son at bedside.   d/w floor acp

## 2023-11-16 NOTE — ED PROVIDER NOTE - PHYSICAL EXAMINATION
Gen: AAOx3, non-toxic elderly male lying in bed in NAD  Head: NCAT  HEENT: EOMI, oral mucosa dry, normal conjunctiva  Lung: no respiratory distress, +crackles b/l bases   CV: RRR, no murmurs, rubs or gallops  Abd: soft, NTND, no guarding, no CVA tenderness  MSK: no visible deformities  Neuro: No focal sensory or motor deficits elicited  Skin: Warm, well perfused, no rash, 1+ pitting edema b/l LEs at baseline per pt  Psych: normal affect.

## 2023-11-16 NOTE — ED ADULT NURSE NOTE - NSFALLUNIVINTERV_ED_ALL_ED
Bed/Stretcher in lowest position, wheels locked, appropriate side rails in place/Call bell, personal items and telephone in reach/Instruct patient to call for assistance before getting out of bed/chair/stretcher/Non-slip footwear applied when patient is off stretcher/[place] to call system/Physically safe environment - no spills, clutter or unnecessary equipment/Purposeful proactive rounding/Room/bathroom lighting operational, light cord in reach

## 2023-11-16 NOTE — PATIENT PROFILE ADULT - DO YOU FEEL LIKE HURTING YOURSELF OR OTHERS?
Add 06269 Cpt? (Important Note: In 2017 The Use Of 15431 Is Being Tracked By Cms To Determine Future Global Period Reimbursement For Global Periods): yes no Detail Level: Detailed

## 2023-11-16 NOTE — ED ADULT NURSE REASSESSMENT NOTE - NS ED NURSE REASSESS COMMENT FT1
Patient made aware of need for urine sample. Declining rectal temp and straight cath at this time. Patient requesting to attempt to urinate on his own. Ceftriaxone pending urine collection.

## 2023-11-16 NOTE — ED PROVIDER NOTE - CLINICAL SUMMARY MEDICAL DECISION MAKING FREE TEXT BOX
Renan 78-year-old male multiple medical problems history of hepatocellular carcinoma status post radiation therapy status post ablation for A-fib on anticoagulation was scheduled for follow-up nuclear stress today which she had at the office but found to be hypotensive during exam 60 palp patient has reported general weakness fatigue lightheadedness x7 to 10 days no chest pain no shortness of breath no dark or bloody stool no nausea vomiting just generalized decreased appetite and poor p.o. intake no cough no urinary frequency or dysuria but known to have bladder stones previously patient brought by EMS on arrival blood pressure 100/60 after 1.3 L of fluid sats 100% not tachycardic no focal findings on physical exam lungs clear abdomen soft nontender no flank no signs of fluid overload no ascitic fluid wave on belly exam POCUS with a flat IVC no B-lines mild decrease in EF no RV strain no ascites seen on ultrasound making SBP much less likely will need to evaluate for metabolic and infectious etiologies including respiratory swab patient will likely need inpatient work-up we will discuss with Dr. Hameed regarding nuclear med results

## 2023-11-16 NOTE — PATIENT PROFILE ADULT - NSPROPTRIGHTBILLOFRIGHTS_GEN_A_NUR
patient
Detail Level: Detailed
Quality 265: Biopsy Follow-Up: Biopsy results reviewed, communicated, tracked, and documented
Quality 226: Preventive Care And Screening: Tobacco Use: Screening And Cessation Intervention: Patient screened for tobacco use and is an ex/non-smoker
Quality 130: Documentation Of Current Medications In The Medical Record: Current Medications Documented
Quality 431: Preventive Care And Screening: Unhealthy Alcohol Use - Screening: Patient not identified as an unhealthy alcohol user when screened for unhealthy alcohol use using a systematic screening method

## 2023-11-16 NOTE — H&P ADULT - PROBLEM SELECTOR PLAN 2
s/p DCCV on eliquis  - f/u repeat EKG- not in chart, muse porgram not loading  - cont sotalol- check QTC on EKG

## 2023-11-16 NOTE — H&P ADULT - ASSESSMENT
78m hx HCC s/p recent ?embolization vs. radiotherapy, HTN, LE, aflutter s/p DCCV on eliquis, LE edema on diuretics presenting with reported hypotension and feelings of fatigue before and after OP stress test

## 2023-11-16 NOTE — H&P ADULT - HISTORY OF PRESENT ILLNESS
78m hx HCC s/p recent ?embolization vs. radiotherapy, HTN, LE, aflutter s/p DCCV on eliquis, LE edema on diuretics presenting with reported hypotension and feelings of fatigue before and after OP stress test. Notes decreased appetite ever since his HCC treatment as food no longer has any taste. Denies any localizing infectious symptoms- no urinary/bowel symptoms, no localized aches/pains, no CP/SOB, no lightheadedness/presyncope, no falls/head trauma. States he received 1.5L IV fluids in Dr. Hameed's office, after which his fatigue abated.     In ED; give ceftriaxone and 700cc fluid bolus

## 2023-11-17 LAB
A1C WITH ESTIMATED AVERAGE GLUCOSE RESULT: 6.5 % — HIGH (ref 4–5.6)
A1C WITH ESTIMATED AVERAGE GLUCOSE RESULT: 6.5 % — HIGH (ref 4–5.6)
ALBUMIN SERPL ELPH-MCNC: 3.1 G/DL — LOW (ref 3.3–5)
ALBUMIN SERPL ELPH-MCNC: 3.1 G/DL — LOW (ref 3.3–5)
ALP SERPL-CCNC: 161 U/L — HIGH (ref 40–120)
ALP SERPL-CCNC: 161 U/L — HIGH (ref 40–120)
ALT FLD-CCNC: 26 U/L — SIGNIFICANT CHANGE UP (ref 10–45)
ALT FLD-CCNC: 26 U/L — SIGNIFICANT CHANGE UP (ref 10–45)
AST SERPL-CCNC: 33 U/L — SIGNIFICANT CHANGE UP (ref 10–40)
AST SERPL-CCNC: 33 U/L — SIGNIFICANT CHANGE UP (ref 10–40)
BILIRUB SERPL-MCNC: 0.5 MG/DL — SIGNIFICANT CHANGE UP (ref 0.2–1.2)
BILIRUB SERPL-MCNC: 0.5 MG/DL — SIGNIFICANT CHANGE UP (ref 0.2–1.2)
BUN SERPL-MCNC: 30 MG/DL — HIGH (ref 7–23)
BUN SERPL-MCNC: 30 MG/DL — HIGH (ref 7–23)
CALCIUM SERPL-MCNC: 8.9 MG/DL — SIGNIFICANT CHANGE UP (ref 8.4–10.5)
CALCIUM SERPL-MCNC: 8.9 MG/DL — SIGNIFICANT CHANGE UP (ref 8.4–10.5)
CHLORIDE SERPL-SCNC: 102 MMOL/L — SIGNIFICANT CHANGE UP (ref 96–108)
CHLORIDE SERPL-SCNC: 102 MMOL/L — SIGNIFICANT CHANGE UP (ref 96–108)
CO2 SERPL-SCNC: 25 MMOL/L — SIGNIFICANT CHANGE UP (ref 22–31)
CO2 SERPL-SCNC: 25 MMOL/L — SIGNIFICANT CHANGE UP (ref 22–31)
CREAT SERPL-MCNC: 1.1 MG/DL — SIGNIFICANT CHANGE UP (ref 0.5–1.3)
CREAT SERPL-MCNC: 1.1 MG/DL — SIGNIFICANT CHANGE UP (ref 0.5–1.3)
EGFR: 69 ML/MIN/1.73M2 — SIGNIFICANT CHANGE UP
EGFR: 69 ML/MIN/1.73M2 — SIGNIFICANT CHANGE UP
ESTIMATED AVERAGE GLUCOSE: 140 MG/DL — HIGH (ref 68–114)
ESTIMATED AVERAGE GLUCOSE: 140 MG/DL — HIGH (ref 68–114)
GLUCOSE BLDC GLUCOMTR-MCNC: 106 MG/DL — HIGH (ref 70–99)
GLUCOSE BLDC GLUCOMTR-MCNC: 106 MG/DL — HIGH (ref 70–99)
GLUCOSE BLDC GLUCOMTR-MCNC: 125 MG/DL — HIGH (ref 70–99)
GLUCOSE BLDC GLUCOMTR-MCNC: 125 MG/DL — HIGH (ref 70–99)
GLUCOSE BLDC GLUCOMTR-MCNC: 142 MG/DL — HIGH (ref 70–99)
GLUCOSE BLDC GLUCOMTR-MCNC: 142 MG/DL — HIGH (ref 70–99)
GLUCOSE BLDC GLUCOMTR-MCNC: 148 MG/DL — HIGH (ref 70–99)
GLUCOSE BLDC GLUCOMTR-MCNC: 148 MG/DL — HIGH (ref 70–99)
GLUCOSE SERPL-MCNC: 146 MG/DL — HIGH (ref 70–99)
GLUCOSE SERPL-MCNC: 146 MG/DL — HIGH (ref 70–99)
HCT VFR BLD CALC: 30.9 % — LOW (ref 39–50)
HCT VFR BLD CALC: 30.9 % — LOW (ref 39–50)
HGB BLD-MCNC: 10.3 G/DL — LOW (ref 13–17)
HGB BLD-MCNC: 10.3 G/DL — LOW (ref 13–17)
MCHC RBC-ENTMCNC: 30.2 PG — SIGNIFICANT CHANGE UP (ref 27–34)
MCHC RBC-ENTMCNC: 30.2 PG — SIGNIFICANT CHANGE UP (ref 27–34)
MCHC RBC-ENTMCNC: 33.3 GM/DL — SIGNIFICANT CHANGE UP (ref 32–36)
MCHC RBC-ENTMCNC: 33.3 GM/DL — SIGNIFICANT CHANGE UP (ref 32–36)
MCV RBC AUTO: 90.6 FL — SIGNIFICANT CHANGE UP (ref 80–100)
MCV RBC AUTO: 90.6 FL — SIGNIFICANT CHANGE UP (ref 80–100)
NRBC # BLD: 0 /100 WBCS — SIGNIFICANT CHANGE UP (ref 0–0)
NRBC # BLD: 0 /100 WBCS — SIGNIFICANT CHANGE UP (ref 0–0)
NT-PROBNP SERPL-SCNC: 2442 PG/ML — HIGH (ref 0–300)
NT-PROBNP SERPL-SCNC: 2442 PG/ML — HIGH (ref 0–300)
PLATELET # BLD AUTO: 190 K/UL — SIGNIFICANT CHANGE UP (ref 150–400)
PLATELET # BLD AUTO: 190 K/UL — SIGNIFICANT CHANGE UP (ref 150–400)
POTASSIUM SERPL-MCNC: 4.3 MMOL/L — SIGNIFICANT CHANGE UP (ref 3.5–5.3)
POTASSIUM SERPL-MCNC: 4.3 MMOL/L — SIGNIFICANT CHANGE UP (ref 3.5–5.3)
POTASSIUM SERPL-SCNC: 4.3 MMOL/L — SIGNIFICANT CHANGE UP (ref 3.5–5.3)
POTASSIUM SERPL-SCNC: 4.3 MMOL/L — SIGNIFICANT CHANGE UP (ref 3.5–5.3)
PROT SERPL-MCNC: 6.1 G/DL — SIGNIFICANT CHANGE UP (ref 6–8.3)
PROT SERPL-MCNC: 6.1 G/DL — SIGNIFICANT CHANGE UP (ref 6–8.3)
RBC # BLD: 3.41 M/UL — LOW (ref 4.2–5.8)
RBC # BLD: 3.41 M/UL — LOW (ref 4.2–5.8)
RBC # FLD: 15.2 % — HIGH (ref 10.3–14.5)
RBC # FLD: 15.2 % — HIGH (ref 10.3–14.5)
SODIUM SERPL-SCNC: 137 MMOL/L — SIGNIFICANT CHANGE UP (ref 135–145)
SODIUM SERPL-SCNC: 137 MMOL/L — SIGNIFICANT CHANGE UP (ref 135–145)
WBC # BLD: 8.08 K/UL — SIGNIFICANT CHANGE UP (ref 3.8–10.5)
WBC # BLD: 8.08 K/UL — SIGNIFICANT CHANGE UP (ref 3.8–10.5)
WBC # FLD AUTO: 8.08 K/UL — SIGNIFICANT CHANGE UP (ref 3.8–10.5)
WBC # FLD AUTO: 8.08 K/UL — SIGNIFICANT CHANGE UP (ref 3.8–10.5)

## 2023-11-17 PROCEDURE — 99233 SBSQ HOSP IP/OBS HIGH 50: CPT

## 2023-11-17 PROCEDURE — 93010 ELECTROCARDIOGRAM REPORT: CPT

## 2023-11-17 RX ORDER — CHLORHEXIDINE GLUCONATE 213 G/1000ML
1 SOLUTION TOPICAL
Refills: 0 | Status: DISCONTINUED | OUTPATIENT
Start: 2023-11-17 | End: 2023-11-22

## 2023-11-17 RX ADMIN — Medication 25 MILLIGRAM(S): at 09:03

## 2023-11-17 RX ADMIN — Medication 80 MILLIGRAM(S): at 09:03

## 2023-11-17 RX ADMIN — ATORVASTATIN CALCIUM 80 MILLIGRAM(S): 80 TABLET, FILM COATED ORAL at 21:47

## 2023-11-17 RX ADMIN — PANTOPRAZOLE SODIUM 40 MILLIGRAM(S): 20 TABLET, DELAYED RELEASE ORAL at 05:16

## 2023-11-17 RX ADMIN — APIXABAN 5 MILLIGRAM(S): 2.5 TABLET, FILM COATED ORAL at 17:41

## 2023-11-17 RX ADMIN — Medication 80 MILLIGRAM(S): at 17:41

## 2023-11-17 RX ADMIN — Medication 100 MILLIGRAM(S): at 12:03

## 2023-11-17 RX ADMIN — APIXABAN 5 MILLIGRAM(S): 2.5 TABLET, FILM COATED ORAL at 05:16

## 2023-11-17 RX ADMIN — CLOPIDOGREL BISULFATE 75 MILLIGRAM(S): 75 TABLET, FILM COATED ORAL at 12:03

## 2023-11-17 RX ADMIN — CHLORHEXIDINE GLUCONATE 1 APPLICATION(S): 213 SOLUTION TOPICAL at 12:02

## 2023-11-17 NOTE — DIETITIAN INITIAL EVALUATION ADULT - PROBLEM SELECTOR PLAN 1
Unclear what patient's pressures were vijay-stress test, but he reports feelings of fatigue even prior to the test. Required 1.5L fluids reportedly after the study. Stress test itself reportedly negative  - doubt arrythmogenic or vagally mediated  - history suggest hypovolemia as cause for presentation- likely in setting of worsening appetite and concurrent diuretic use  - BP soft but acceptable at present, continue monitoring  - check orthostatics  - hold diuretics for now  - nutrition consult  - encouraged patient to eat regular meals as if they were medicine- he agrees    #. Type II MI vs. demand ischemia given elevated troponin  - suspect stress/dehydration mediated- low suspicion for ACS given negative stress test today vs. HF as pt has no prior history and last TTE 10/23 was WNL  - cardiology following- f/u recs  - can observe on tele x 24 hrs, then dc  - f/u repeat EKG as none in chart    # Probable CKD- fluctuates between Cr 1.1-1.4 as OP- CKD II/III. No sudhakar RAZIA at present  - cont monitoring, renally dose meds  - hold diuretics for now

## 2023-11-17 NOTE — DIETITIAN INITIAL EVALUATION ADULT - ENERGY INTAKE
Fair (50-75%) -- Per flow sheets, noted with fair PO intakes (50-75%)  -- Copy of menu was provided to patient by RD and reviewed menu ordering procedure with patient

## 2023-11-17 NOTE — PROGRESS NOTE ADULT - SUBJECTIVE AND OBJECTIVE BOX
DATE OF SERVICE: 11-17-23 @ 10:31    Patient is a 78y old  Male who presents with a chief complaint of hypotension (16 Nov 2023 17:21)      INTERVAL HISTORY: Feels much better.     REVIEW OF SYSTEMS:  CONSTITUTIONAL: No weakness  EYES/ENT: No visual changes;  No throat pain   NECK: No pain or stiffness  RESPIRATORY: No cough, wheezing; No shortness of breath  CARDIOVASCULAR: No chest pain or palpitations  GASTROINTESTINAL: No abdominal  pain. No nausea, vomiting, or hematemesis  GENITOURINARY: No dysuria, frequency or hematuria  NEUROLOGICAL: No stroke like symptoms  SKIN: No rashes    TELEMETRY Personally reviewed: SR 80-90  	  MEDICATIONS:  metoprolol succinate ER 25 milliGRAM(s) Oral daily  sotalol. 80 milliGRAM(s) Oral every 12 hours        PHYSICAL EXAM:  T(C): 36.7 (11-17-23 @ 04:13), Max: 36.9 (11-16-23 @ 14:00)  HR: 80 (11-17-23 @ 08:59) (80 - 95)  BP: 100/62 (11-17-23 @ 08:59) (96/53 - 111/43)  RR: 18 (11-17-23 @ 04:13) (17 - 22)  SpO2: 97% (11-17-23 @ 04:13) (97% - 100%)  Wt(kg): --  I&O's Summary    16 Nov 2023 07:01  -  17 Nov 2023 07:00  --------------------------------------------------------  IN: 0 mL / OUT: 300 mL / NET: -300 mL    17 Nov 2023 07:01  -  17 Nov 2023 10:31  --------------------------------------------------------  IN: 300 mL / OUT: 0 mL / NET: 300 mL      Height (cm): 182.9 (11-16 @ 13:18)    Appearance: In no distress	  HEENT:    PERRL, EOMI	  Cardiovascular:  S1 S2, No JVD  Respiratory: Lungs clear to auscultation	  Gastrointestinal:  Soft, Non-tender, + BS	  Vascularature:  + edema of LE  Psychiatric: Appropriate affect   Neuro: no acute focal deficits                               10.3   8.08  )-----------( 190      ( 17 Nov 2023 10:00 )             30.9     11-16    140  |  100  |  36<H>  ----------------------------<  113<H>  4.2   |  25  |  1.45<H>    Ca    8.6      16 Nov 2023 15:53    TPro  6.1  /  Alb  3.1<L>  /  TBili  0.5  /  DBili  x   /  AST  35  /  ALT  22  /  AlkPhos  169<H>  11-16        Labs personally reviewed      ASSESSMENT/PLAN: 	    78-year-old male multiple medical problems history of hepatocellular carcinoma status post radiation therapy, AF s/p DCCV on Eliquis who was found to be hypotensive following NST. Patient has reported general weakness, fatigue, lightheadedness since being discharged from hospital. Reports mild chest discomfort in midsternal region. Reports dyspnea with minimal exertion. Also reports significant lack of appetite and poor PO intake. No dark or bloody stool, nausea or vomiting.       Problem/Plan - 1:  ·  Problem: Hypotension  - Occurred s/p NST (no exercise component conducted)--> NST normal study as per Dr. Hameed  - Trop 91 --> lower than previous admission   - BUN/Cr elevated  - BP now recovering with fluid resuscitation. Patient reports significantly poor appetite and decreased PO intake since hospital discharge.  - Hypotension likely 2/2 hypovolemia.  - Follow up urine and blood cultures.     Problem/Plan - 2:  ·  Problem: Chest Pain  ·  Plan: Reported as mild intensity, midsternal, non-exertional and self resolves on its own.  - Trop 91 (similar to prior admission)  - Hx of recent PCI  - TTE from 10/25 shows preserved EF and no WMA  - NST 11/16 wnl  - CP unlikely cardiac in etiology.   - Chest pain now resolved.      Problem/Plan - 3:  ·  Problem: Shortness of Breath  ·  Plan: ECG non-ischemic  - Prior TTE wnl.   - CXR shows clear lungs  - Patient was not discharged on his usual Torsemide following previous hospital admission d/t RAZIA.   - BNP 2442 (lower than previous DC)  - Will likely need to resume Torsemide but at lower dose.      Problem/Plan - 4:  ·  Problem: CAD.   ·  Plan: Recent PCI to pLAD in June 2023  - ECG non-ischemic  - Trop as noted above  - c/w Plavix and statin     Problem/Plan - 5:  ·  Problem: RAZIA   ·  Plan: RAZIA possibly pre-renal d/t poor intake.   - Cont to hold spironolactone also i/s/o hyperkalemia  - Was restarted on Torsemide 40mg PO BID as OP -- cont to hold  - s/p IVF     Problem/Plan - 6:  ·  Problem: Atrial Fibrillation  - s/p succesful DCCV 10/31  - Cont Eliquis 5mg BID  - Cont Toprol 25mg PO daily  - C/w of Sotalol 80mg PO BID              Sulma Espinosa, ROSIO-NP   Hank Hayes DO [facility]  Cardiovascular Medicine  [address] [place] [address], [address]  Available through call or text on [employer]  Office: [phone]   DATE OF SERVICE: 11-17-23 @ 10:31    Patient is a 78y old  Male who presents with a chief complaint of hypotension (16 Nov 2023 17:21)      INTERVAL HISTORY: Feels much better.     REVIEW OF SYSTEMS:  CONSTITUTIONAL: No weakness  EYES/ENT: No visual changes;  No throat pain   NECK: No pain or stiffness  RESPIRATORY: No cough, wheezing; No shortness of breath  CARDIOVASCULAR: No chest pain or palpitations  GASTROINTESTINAL: No abdominal  pain. No nausea, vomiting, or hematemesis  GENITOURINARY: No dysuria, frequency or hematuria  NEUROLOGICAL: No stroke like symptoms  SKIN: No rashes    TELEMETRY Personally reviewed: SR 80-90  	  MEDICATIONS:  metoprolol succinate ER 25 milliGRAM(s) Oral daily  sotalol. 80 milliGRAM(s) Oral every 12 hours        PHYSICAL EXAM:  T(C): 36.7 (11-17-23 @ 04:13), Max: 36.9 (11-16-23 @ 14:00)  HR: 80 (11-17-23 @ 08:59) (80 - 95)  BP: 100/62 (11-17-23 @ 08:59) (96/53 - 111/43)  RR: 18 (11-17-23 @ 04:13) (17 - 22)  SpO2: 97% (11-17-23 @ 04:13) (97% - 100%)  Wt(kg): --  I&O's Summary    16 Nov 2023 07:01  -  17 Nov 2023 07:00  --------------------------------------------------------  IN: 0 mL / OUT: 300 mL / NET: -300 mL    17 Nov 2023 07:01  -  17 Nov 2023 10:31  --------------------------------------------------------  IN: 300 mL / OUT: 0 mL / NET: 300 mL      Height (cm): 182.9 (11-16 @ 13:18)    Appearance: In no distress	  HEENT:    PERRL, EOMI	  Cardiovascular:  S1 S2, No JVD  Respiratory: Lungs clear to auscultation	  Gastrointestinal:  Soft, Non-tender, + BS	  Vascularature:  + edema of LE  Psychiatric: Appropriate affect   Neuro: no acute focal deficits                               10.3   8.08  )-----------( 190      ( 17 Nov 2023 10:00 )             30.9     11-16    140  |  100  |  36<H>  ----------------------------<  113<H>  4.2   |  25  |  1.45<H>    Ca    8.6      16 Nov 2023 15:53    TPro  6.1  /  Alb  3.1<L>  /  TBili  0.5  /  DBili  x   /  AST  35  /  ALT  22  /  AlkPhos  169<H>  11-16        Labs personally reviewed      ASSESSMENT/PLAN: 	    78-year-old male multiple medical problems history of hepatocellular carcinoma status post radiation therapy, AF s/p DCCV on Eliquis who was found to be hypotensive following NST. Patient has reported general weakness, fatigue, lightheadedness since being discharged from hospital. Reports mild chest discomfort in midsternal region. Reports dyspnea with minimal exertion. Also reports significant lack of appetite and poor PO intake. No dark or bloody stool, nausea or vomiting.       Problem/Plan - 1:  ·  Problem: Hypotension  - Occurred s/p NST (no exercise component conducted)--> NST normal study as per Dr. Hameed  - Trop 91 --> lower than previous admission   - BUN/Cr elevated  - BP now recovering with fluid resuscitation. Patient reports significantly poor appetite and decreased PO intake since hospital discharge.  - Hypotension likely 2/2 hypovolemia.  - Follow up urine and blood cultures.  - rec adrenal insuffiencey work up      Problem/Plan - 2:  ·  Problem: Chest Pain  ·  Plan: Reported as mild intensity, midsternal, non-exertional and self resolves on its own.  - Trop 91 (similar to prior admission)  - Hx of recent PCI  - TTE from 10/25 shows preserved EF and no WMA  - NST 11/16 wnl  - CP unlikely cardiac in etiology.   - Chest pain now resolved.      Problem/Plan - 3:  ·  Problem: Shortness of Breath  ·  Plan: ECG non-ischemic  - Prior TTE wnl.   - CXR shows clear lungs  - Patient was not discharged on his usual Torsemide following previous hospital admission d/t RAZIA.   - BNP 2442 (lower than previous DC)  - Will likely need to resume Torsemide but at lower dose.      Problem/Plan - 4:  ·  Problem: CAD.   ·  Plan: Recent PCI to pLAD in June 2023  - ECG non-ischemic  - Trop as noted above  - c/w Plavix and statin     Problem/Plan - 5:  ·  Problem: RAZIA   ·  Plan: RAZIA possibly pre-renal d/t poor intake.   - Cont to hold spironolactone also i/s/o hyperkalemia  - Was restarted on Torsemide 40mg PO BID as OP -- cont to hold  - s/p IVF     Problem/Plan - 6:  ·  Problem: Atrial Fibrillation  - s/p succesful DCCV 10/31  - Cont Eliquis 5mg BID  - Cont Toprol 25mg PO daily  - C/w of Sotalol 80mg PO BID              Sulma Espinosa, AG-NP   Hank Hayes DO [facility]  Cardiovascular Medicine  [address] [place] [address], [address]  Available through call or text on [employer]  Office: [phone]

## 2023-11-17 NOTE — DIETITIAN INITIAL EVALUATION ADULT - PERTINENT MEDS FT
MEDICATIONS  (STANDING):  allopurinol 100 milliGRAM(s) Oral daily  apixaban 5 milliGRAM(s) Oral two times a day  atorvastatin 80 milliGRAM(s) Oral at bedtime  clopidogrel Tablet 75 milliGRAM(s) Oral daily  dextrose 5%. 1000 milliLiter(s) (50 mL/Hr) IV Continuous <Continuous>  dextrose 5%. 1000 milliLiter(s) (100 mL/Hr) IV Continuous <Continuous>  dextrose 50% Injectable 25 Gram(s) IV Push once  dextrose 50% Injectable 12.5 Gram(s) IV Push once  dextrose 50% Injectable 25 Gram(s) IV Push once  glucagon  Injectable 1 milliGRAM(s) IntraMuscular once  insulin lispro (ADMELOG) corrective regimen sliding scale   SubCutaneous Before meals and at bedtime  metoprolol succinate ER 25 milliGRAM(s) Oral daily  pantoprazole    Tablet 40 milliGRAM(s) Oral before breakfast  sotalol. 80 milliGRAM(s) Oral every 12 hours    MEDICATIONS  (PRN):  acetaminophen     Tablet .. 650 milliGRAM(s) Oral every 6 hours PRN Temp greater or equal to 38C (100.4F), Mild Pain (1 - 3)  aluminum hydroxide/magnesium hydroxide/simethicone Suspension 30 milliLiter(s) Oral every 4 hours PRN Dyspepsia  dextrose Oral Gel 15 Gram(s) Oral once PRN Blood Glucose LESS THAN 70 milliGRAM(s)/deciliter  melatonin 3 milliGRAM(s) Oral at bedtime PRN Insomnia  ondansetron Injectable 4 milliGRAM(s) IV Push every 8 hours PRN Nausea and/or Vomiting

## 2023-11-17 NOTE — DIETITIAN INITIAL EVALUATION ADULT - PERTINENT LABORATORY DATA
11-17    137  |  102  |  30<H>  ----------------------------<  146<H>  4.3   |  25  |  1.10    Ca    8.9      17 Nov 2023 10:00    TPro  6.1  /  Alb  3.1<L>  /  TBili  0.5  /  DBili  x   /  AST  33  /  ALT  26  /  AlkPhos  161<H>  11-17    POCT Blood Glucose.: 106 mg/dL (11-17-23 @ 07:40)  POCT Blood Glucose.: 130 mg/dL (11-16-23 @ 21:43)    A1C with Estimated Average Glucose Result: 6.5 % (11-17-23 @ 06:38)  A1C with Estimated Average Glucose Result: 6.7 % (10-25-23 @ 06:57)

## 2023-11-17 NOTE — PROGRESS NOTE ADULT - SUBJECTIVE AND OBJECTIVE BOX
PROGRESS NOTE:     Patient is a 78y old  Male who presents with a chief complaint of hypotension (17 Nov 2023 13:06)      SUBJECTIVE / OVERNIGHT EVENTS:   Patient seen and evaluated at bedside. Denies lightheadedness, denies dizziness, denies chest pain, denies SOB, reports ambulated to the bathroom this AM with no difficulty.     ADDITIONAL REVIEW OF SYSTEMS:    MEDICATIONS  (STANDING):  allopurinol 100 milliGRAM(s) Oral daily  apixaban 5 milliGRAM(s) Oral two times a day  atorvastatin 80 milliGRAM(s) Oral at bedtime  chlorhexidine 2% Cloths 1 Application(s) Topical <User Schedule>  clopidogrel Tablet 75 milliGRAM(s) Oral daily  dextrose 5%. 1000 milliLiter(s) (50 mL/Hr) IV Continuous <Continuous>  dextrose 5%. 1000 milliLiter(s) (100 mL/Hr) IV Continuous <Continuous>  dextrose 50% Injectable 25 Gram(s) IV Push once  dextrose 50% Injectable 12.5 Gram(s) IV Push once  dextrose 50% Injectable 25 Gram(s) IV Push once  glucagon  Injectable 1 milliGRAM(s) IntraMuscular once  insulin lispro (ADMELOG) corrective regimen sliding scale   SubCutaneous Before meals and at bedtime  metoprolol succinate ER 25 milliGRAM(s) Oral daily  pantoprazole    Tablet 40 milliGRAM(s) Oral before breakfast  sotalol. 80 milliGRAM(s) Oral every 12 hours    MEDICATIONS  (PRN):  acetaminophen     Tablet .. 650 milliGRAM(s) Oral every 6 hours PRN Temp greater or equal to 38C (100.4F), Mild Pain (1 - 3)  aluminum hydroxide/magnesium hydroxide/simethicone Suspension 30 milliLiter(s) Oral every 4 hours PRN Dyspepsia  dextrose Oral Gel 15 Gram(s) Oral once PRN Blood Glucose LESS THAN 70 milliGRAM(s)/deciliter  melatonin 3 milliGRAM(s) Oral at bedtime PRN Insomnia  ondansetron Injectable 4 milliGRAM(s) IV Push every 8 hours PRN Nausea and/or Vomiting      CAPILLARY BLOOD GLUCOSE      POCT Blood Glucose.: 142 mg/dL (17 Nov 2023 11:49)  POCT Blood Glucose.: 106 mg/dL (17 Nov 2023 07:40)  POCT Blood Glucose.: 130 mg/dL (16 Nov 2023 21:43)    I&O's Summary    16 Nov 2023 07:01  -  17 Nov 2023 07:00  --------------------------------------------------------  IN: 0 mL / OUT: 300 mL / NET: -300 mL    17 Nov 2023 07:01  -  17 Nov 2023 14:31  --------------------------------------------------------  IN: 600 mL / OUT: 250 mL / NET: 350 mL        PHYSICAL EXAM:  Vital Signs Last 24 Hrs  T(C): 36.9 (17 Nov 2023 11:25), Max: 36.9 (16 Nov 2023 17:21)  T(F): 98.4 (17 Nov 2023 11:25), Max: 98.4 (16 Nov 2023 17:21)  HR: 76 (17 Nov 2023 11:25) (76 - 95)  BP: 107/69 (17 Nov 2023 11:25) (96/53 - 111/43)  BP(mean): 65 (16 Nov 2023 17:21) (62 - 65)  RR: 18 (17 Nov 2023 11:25) (18 - 22)  SpO2: 98% (17 Nov 2023 11:25) (97% - 100%)    Parameters below as of 17 Nov 2023 11:25  Patient On (Oxygen Delivery Method): room air        CONSTITUTIONAL: NAD  RESPIRATORY: Normal respiratory effort; lungs are clear to auscultation bilaterally  CARDIOVASCULAR: Regular rate and rhythm, normal S1 and S2, no murmur/rub/gallop; trace bilateral lower extremity edema   ABDOMEN: Nontender to palpation, normoactive bowel sounds, no rebound/guarding  PSYCH: A+O to person, place, and time; affect appropriate    LABS:                        10.3   8.08  )-----------( 190      ( 17 Nov 2023 10:00 )             30.9     11-17    137  |  102  |  30<H>  ----------------------------<  146<H>  4.3   |  25  |  1.10    Ca    8.9      17 Nov 2023 10:00    TPro  6.1  /  Alb  3.1<L>  /  TBili  0.5  /  DBili  x   /  AST  33  /  ALT  26  /  AlkPhos  161<H>  11-17    PT/INR - ( 16 Nov 2023 14:16 )   PT: 18.7 sec;   INR: 1.81 ratio         PTT - ( 16 Nov 2023 14:16 )  PTT:33.8 sec      Urinalysis Basic - ( 17 Nov 2023 10:00 )    Color: x / Appearance: x / SG: x / pH: x  Gluc: 146 mg/dL / Ketone: x  / Bili: x / Urobili: x   Blood: x / Protein: x / Nitrite: x   Leuk Esterase: x / RBC: x / WBC x   Sq Epi: x / Non Sq Epi: x / Bacteria: x          RADIOLOGY & ADDITIONAL TESTS:  Personally reviewed ECG from 11/17: normal sinus rhythm, QTc 457,  no ST elevation, no ST depression  COORDINATION OF CARE:  Care Discussed with Consultants/Other Providers [Y/N]:  Prior or Outpatient Records Reviewed [Y/N]:

## 2023-11-17 NOTE — DIETITIAN INITIAL EVALUATION ADULT - PROBLEM SELECTOR PLAN 5
On torsemide which was recently stopped, as well as aldactone. Currently has trace to no edema B/L  - no HF hx- will hold diuretics for now  - pt reports recent LE duplex which was negative in the last month, low suspicion for DVT      dispo: likely home tomorrow pending overnight observation, orthostatics, cards clearance  d/w and agreed on by pt and son at bedside.   d/w floor acp

## 2023-11-17 NOTE — DIETITIAN INITIAL EVALUATION ADULT - PHYSCIAL ASSESSMENT
Weights:  - Pt reports his UBW is about ~ 270 pounds, pt unable to report when he was last at this  weight.     Current Admission Weights:  - Dosing weight: 120.2kg/ 265 pounds  (10/31/23)  - Bed Scale weight obtained during RD visit: 119.5 kg/263.5 pounds (11/17/23)      Weight History per [facility]:  - 120.2 kg/ 265.1 pounds (11/16/23), 128.8 kg/ 284 pounds (5/31/22)    Weight Change:  -20.5 pounds/7.2% Weight loss noted X 6 months; weight loss is not clinically significant for 6 month time frame.   - Pt noted with edema. Weight fluctuations in setting of fluid shifts anticipated. Will continue to monitor weight trends as available/able.     IBW: 178 pounds   %IBW: 148%

## 2023-11-17 NOTE — DIETITIAN INITIAL EVALUATION ADULT - NSFNSADHERENCEPTAFT_GEN_A_CORE
-- Hx of Diabetes, most recent A1c= 6.5 % (11-17-23), indicates good glycemic control and slight improvement from previous level, A1C: 6.7 % (10-25-23)  -- Continues on SSI

## 2023-11-17 NOTE — CONSULT NOTE ADULT - NS ATTEND AMEND GEN_ALL_CORE FT
metastatic pancreatic NET, admitted for hypotension  suspected hypovolemia, improved w/IVF  CBC stable

## 2023-11-17 NOTE — CONSULT NOTE ADULT - SUBJECTIVE AND OBJECTIVE BOX
Reason for consult: neuroendocrine tumor    HPI:  78m hx HCC s/p recent ?embolization vs. radiotherapy, HTN, LE, aflutter s/p DCCV on eliquis, LE edema on diuretics presenting with reported hypotension and feelings of fatigue before and after OP stress test. Notes decreased appetite ever since his HCC treatment as food no longer has any taste. Denies any localizing infectious symptoms- no urinary/bowel symptoms, no localized aches/pains, no CP/SOB, no lightheadedness/presyncope, no falls/head trauma. States he received 1.5L IV fluids in Dr. Hameed's office, after which his fatigue abated.     In ED; give ceftriaxone and 700cc fluid bolus (16 Nov 2023 17:21)    Hematology/Oncology called to see patient who follows with Dr. Sophia Prasad of [facility] for the treatment of neuroendocrine tumor    PAST MEDICAL & SURGICAL HISTORY:  Hypertension      Hypercholesterolemia      PAD (peripheral artery disease)      Neuroendocrine carcinoma      S/P knee replacement, bilateral      S/P hip replacement  right hip      History of hernia repair      H/O laminectomy      History of lumbosacral spine surgery          FAMILY HISTORY:      Alochol: Denied  Smoking: Nonsmoker  Drug Use: Denied  Marital Status:         Allergies    No Known Allergies    Intolerances        MEDICATIONS  (STANDING):  allopurinol 100 milliGRAM(s) Oral daily  apixaban 5 milliGRAM(s) Oral two times a day  atorvastatin 80 milliGRAM(s) Oral at bedtime  chlorhexidine 2% Cloths 1 Application(s) Topical <User Schedule>  clopidogrel Tablet 75 milliGRAM(s) Oral daily  dextrose 5%. 1000 milliLiter(s) (50 mL/Hr) IV Continuous <Continuous>  dextrose 5%. 1000 milliLiter(s) (100 mL/Hr) IV Continuous <Continuous>  dextrose 50% Injectable 25 Gram(s) IV Push once  dextrose 50% Injectable 12.5 Gram(s) IV Push once  dextrose 50% Injectable 25 Gram(s) IV Push once  glucagon  Injectable 1 milliGRAM(s) IntraMuscular once  insulin lispro (ADMELOG) corrective regimen sliding scale   SubCutaneous Before meals and at bedtime  metoprolol succinate ER 25 milliGRAM(s) Oral daily  pantoprazole    Tablet 40 milliGRAM(s) Oral before breakfast  sotalol. 80 milliGRAM(s) Oral every 12 hours    MEDICATIONS  (PRN):  acetaminophen     Tablet .. 650 milliGRAM(s) Oral every 6 hours PRN Temp greater or equal to 38C (100.4F), Mild Pain (1 - 3)  aluminum hydroxide/magnesium hydroxide/simethicone Suspension 30 milliLiter(s) Oral every 4 hours PRN Dyspepsia  dextrose Oral Gel 15 Gram(s) Oral once PRN Blood Glucose LESS THAN 70 milliGRAM(s)/deciliter  melatonin 3 milliGRAM(s) Oral at bedtime PRN Insomnia  ondansetron Injectable 4 milliGRAM(s) IV Push every 8 hours PRN Nausea and/or Vomiting      ROS  No fever, sweats, chills  No epistaxis, HA, sore throat  No CP, SOB, cough, sputum  No n/v/d, abd pain, melena, hematochezia  No edema  No rash  No anxiety  No back pain, joint pain  No bleeding, bruising  No dysuria, hematuria    T(C): 36.9 (11-17-23 @ 11:25), Max: 36.9 (11-16-23 @ 14:00)  HR: 76 (11-17-23 @ 11:25) (76 - 95)  BP: 107/69 (11-17-23 @ 11:25) (96/53 - 111/43)  RR: 18 (11-17-23 @ 11:25) (17 - 22)  SpO2: 98% (11-17-23 @ 11:25) (97% - 100%)  Wt(kg): --    PE  NAD  Awake, alert  Anicteric, MMM  RRR  CTAB  Abd soft, NT, ND  No c/c/e  No rash grossly  FROM                          10.3   8.08  )-----------( 190      ( 17 Nov 2023 10:00 )             30.9       11-17    137  |  102  |  30<H>  ----------------------------<  146<H>  4.3   |  25  |  1.10    Ca    8.9      17 Nov 2023 10:00    TPro  6.1  /  Alb  3.1<L>  /  TBili  0.5  /  DBili  x   /  AST  33  /  ALT  26  /  AlkPhos  161<H>  11-17

## 2023-11-17 NOTE — DIETITIAN INITIAL EVALUATION ADULT - ORAL INTAKE PTA/DIET HISTORY
Visited pt at bedside this morning. reports good PO intake PTA. Pt denies nausea, vomiting, diarrhea, or constipation. Denies difficulty chewing/swallowing. Did not take any over-the-counter oral nutritional supplement or vitamins/minerals PTA.

## 2023-11-17 NOTE — DIETITIAN INITIAL EVALUATION ADULT - ADD RECOMMEND
1. Continue no therapeutic dietary restrictions to optimize PO intakes.   2. Encourage adequate PO intakes. Honor food preferences as appropriate and available. Encourage pt to order meals and utilize menu preferences.   3. RD to trial mighty shakes 2x/day to augment PO intakes.   4. Monitor PO intake, GI tolerance, skin integrity and labs. RD remains available if needed, pt is aware.

## 2023-11-17 NOTE — CONSULT NOTE ADULT - ASSESSMENT
NANDO HERNANDEZ is a 78y Male who presents with a chief complaint of hypotension.    Metastatic Pancreatic Neuroendocrine Tumor  ·	Patient follows with Dr. Sophia Prasad, [facility].  ·	Recent progression of disease; last received Lutetium Edith-177 LUTATHERA on October 20th.  ·	No systemic therapy while inpatient or during rehabilitation  ·	ongoing care after discharge    Anemia  ·	at baseline  ·	In the setting of inflammation, active malignancy  ·	Hemoglobin has been stable.  ·	Monitor and maintain HGB > 7    Hypotension  ·	cardiology consulted  ·	BP now recovering with fluid resuscitation. Patient reports significantly poor appetite and decreased PO intake since hospital discharge.  ·	Hypotension likely 2/2 hypovolemia.  ·	Follow up urine and blood cultures.     Chest Pain  ·	now resolved  ·	Reported as mild intensity, midsternal, non-exertional and self resolves on its own.  ·	TTE from 10/25 shows preserved EF and no WMA    will follow,    Christen Rsoales NP  Hematology/ Oncology  [facility]  [phone] (office)  [phone] (alt office)  Evenings and weekends please call MD on call or office

## 2023-11-17 NOTE — DIETITIAN INITIAL EVALUATION ADULT - REASON INDICATOR FOR ASSESSMENT
Consult-Nutrition Support Team  Information obtained from: Review of pt's current medical record, interview with pt in his assigned room on [facility]

## 2023-11-17 NOTE — DIETITIAN INITIAL EVALUATION ADULT - REASON FOR ADMISSION
Chart Reviewed, Events Noted  "Patient is a 78y old  Male who presents with a chief complaint of hypotension."

## 2023-11-18 ENCOUNTER — TRANSCRIPTION ENCOUNTER (OUTPATIENT)
Age: 78
End: 2023-11-18

## 2023-11-18 LAB
ALBUMIN SERPL ELPH-MCNC: 2.9 G/DL — LOW (ref 3.3–5)
ALBUMIN SERPL ELPH-MCNC: 2.9 G/DL — LOW (ref 3.3–5)
ALP SERPL-CCNC: 160 U/L — HIGH (ref 40–120)
ALP SERPL-CCNC: 160 U/L — HIGH (ref 40–120)
ALT FLD-CCNC: 34 U/L — SIGNIFICANT CHANGE UP (ref 10–45)
ALT FLD-CCNC: 34 U/L — SIGNIFICANT CHANGE UP (ref 10–45)
ANION GAP SERPL CALC-SCNC: 12 MMOL/L — SIGNIFICANT CHANGE UP (ref 5–17)
ANION GAP SERPL CALC-SCNC: 12 MMOL/L — SIGNIFICANT CHANGE UP (ref 5–17)
AST SERPL-CCNC: 38 U/L — SIGNIFICANT CHANGE UP (ref 10–40)
AST SERPL-CCNC: 38 U/L — SIGNIFICANT CHANGE UP (ref 10–40)
BILIRUB SERPL-MCNC: 0.5 MG/DL — SIGNIFICANT CHANGE UP (ref 0.2–1.2)
BILIRUB SERPL-MCNC: 0.5 MG/DL — SIGNIFICANT CHANGE UP (ref 0.2–1.2)
BUN SERPL-MCNC: 22 MG/DL — SIGNIFICANT CHANGE UP (ref 7–23)
BUN SERPL-MCNC: 22 MG/DL — SIGNIFICANT CHANGE UP (ref 7–23)
CALCIUM SERPL-MCNC: 8.8 MG/DL — SIGNIFICANT CHANGE UP (ref 8.4–10.5)
CALCIUM SERPL-MCNC: 8.8 MG/DL — SIGNIFICANT CHANGE UP (ref 8.4–10.5)
CHLORIDE SERPL-SCNC: 101 MMOL/L — SIGNIFICANT CHANGE UP (ref 96–108)
CHLORIDE SERPL-SCNC: 101 MMOL/L — SIGNIFICANT CHANGE UP (ref 96–108)
CO2 SERPL-SCNC: 26 MMOL/L — SIGNIFICANT CHANGE UP (ref 22–31)
CO2 SERPL-SCNC: 26 MMOL/L — SIGNIFICANT CHANGE UP (ref 22–31)
CORTIS AM PEAK SERPL-MCNC: 18.3 UG/DL — SIGNIFICANT CHANGE UP (ref 6–18.4)
CORTIS AM PEAK SERPL-MCNC: 18.3 UG/DL — SIGNIFICANT CHANGE UP (ref 6–18.4)
CREAT SERPL-MCNC: 0.97 MG/DL — SIGNIFICANT CHANGE UP (ref 0.5–1.3)
CREAT SERPL-MCNC: 0.97 MG/DL — SIGNIFICANT CHANGE UP (ref 0.5–1.3)
CULTURE RESULTS: SIGNIFICANT CHANGE UP
CULTURE RESULTS: SIGNIFICANT CHANGE UP
EGFR: 80 ML/MIN/1.73M2 — SIGNIFICANT CHANGE UP
EGFR: 80 ML/MIN/1.73M2 — SIGNIFICANT CHANGE UP
GLUCOSE BLDC GLUCOMTR-MCNC: 104 MG/DL — HIGH (ref 70–99)
GLUCOSE BLDC GLUCOMTR-MCNC: 104 MG/DL — HIGH (ref 70–99)
GLUCOSE BLDC GLUCOMTR-MCNC: 123 MG/DL — HIGH (ref 70–99)
GLUCOSE BLDC GLUCOMTR-MCNC: 123 MG/DL — HIGH (ref 70–99)
GLUCOSE BLDC GLUCOMTR-MCNC: 144 MG/DL — HIGH (ref 70–99)
GLUCOSE BLDC GLUCOMTR-MCNC: 144 MG/DL — HIGH (ref 70–99)
GLUCOSE BLDC GLUCOMTR-MCNC: 171 MG/DL — HIGH (ref 70–99)
GLUCOSE BLDC GLUCOMTR-MCNC: 171 MG/DL — HIGH (ref 70–99)
GLUCOSE SERPL-MCNC: 119 MG/DL — HIGH (ref 70–99)
GLUCOSE SERPL-MCNC: 119 MG/DL — HIGH (ref 70–99)
HCT VFR BLD CALC: 30.5 % — LOW (ref 39–50)
HCT VFR BLD CALC: 30.5 % — LOW (ref 39–50)
HGB BLD-MCNC: 10 G/DL — LOW (ref 13–17)
HGB BLD-MCNC: 10 G/DL — LOW (ref 13–17)
MAGNESIUM SERPL-MCNC: 1.4 MG/DL — LOW (ref 1.6–2.6)
MAGNESIUM SERPL-MCNC: 1.4 MG/DL — LOW (ref 1.6–2.6)
MCHC RBC-ENTMCNC: 30.3 PG — SIGNIFICANT CHANGE UP (ref 27–34)
MCHC RBC-ENTMCNC: 30.3 PG — SIGNIFICANT CHANGE UP (ref 27–34)
MCHC RBC-ENTMCNC: 32.8 GM/DL — SIGNIFICANT CHANGE UP (ref 32–36)
MCHC RBC-ENTMCNC: 32.8 GM/DL — SIGNIFICANT CHANGE UP (ref 32–36)
MCV RBC AUTO: 92.4 FL — SIGNIFICANT CHANGE UP (ref 80–100)
MCV RBC AUTO: 92.4 FL — SIGNIFICANT CHANGE UP (ref 80–100)
MRSA PCR RESULT.: SIGNIFICANT CHANGE UP
MRSA PCR RESULT.: SIGNIFICANT CHANGE UP
NRBC # BLD: 0 /100 WBCS — SIGNIFICANT CHANGE UP (ref 0–0)
NRBC # BLD: 0 /100 WBCS — SIGNIFICANT CHANGE UP (ref 0–0)
PLATELET # BLD AUTO: 202 K/UL — SIGNIFICANT CHANGE UP (ref 150–400)
PLATELET # BLD AUTO: 202 K/UL — SIGNIFICANT CHANGE UP (ref 150–400)
POTASSIUM SERPL-MCNC: 3.6 MMOL/L — SIGNIFICANT CHANGE UP (ref 3.5–5.3)
POTASSIUM SERPL-MCNC: 3.6 MMOL/L — SIGNIFICANT CHANGE UP (ref 3.5–5.3)
POTASSIUM SERPL-SCNC: 3.6 MMOL/L — SIGNIFICANT CHANGE UP (ref 3.5–5.3)
POTASSIUM SERPL-SCNC: 3.6 MMOL/L — SIGNIFICANT CHANGE UP (ref 3.5–5.3)
PROT SERPL-MCNC: 6 G/DL — SIGNIFICANT CHANGE UP (ref 6–8.3)
PROT SERPL-MCNC: 6 G/DL — SIGNIFICANT CHANGE UP (ref 6–8.3)
RBC # BLD: 3.3 M/UL — LOW (ref 4.2–5.8)
RBC # BLD: 3.3 M/UL — LOW (ref 4.2–5.8)
RBC # FLD: 15.3 % — HIGH (ref 10.3–14.5)
RBC # FLD: 15.3 % — HIGH (ref 10.3–14.5)
S AUREUS DNA NOSE QL NAA+PROBE: DETECTED
S AUREUS DNA NOSE QL NAA+PROBE: DETECTED
SODIUM SERPL-SCNC: 138 MMOL/L — SIGNIFICANT CHANGE UP (ref 135–145)
SODIUM SERPL-SCNC: 138 MMOL/L — SIGNIFICANT CHANGE UP (ref 135–145)
SPECIMEN SOURCE: SIGNIFICANT CHANGE UP
SPECIMEN SOURCE: SIGNIFICANT CHANGE UP
WBC # BLD: 7.68 K/UL — SIGNIFICANT CHANGE UP (ref 3.8–10.5)
WBC # BLD: 7.68 K/UL — SIGNIFICANT CHANGE UP (ref 3.8–10.5)
WBC # FLD AUTO: 7.68 K/UL — SIGNIFICANT CHANGE UP (ref 3.8–10.5)
WBC # FLD AUTO: 7.68 K/UL — SIGNIFICANT CHANGE UP (ref 3.8–10.5)

## 2023-11-18 PROCEDURE — 99233 SBSQ HOSP IP/OBS HIGH 50: CPT

## 2023-11-18 RX ORDER — MAGNESIUM SULFATE 500 MG/ML
2 VIAL (ML) INJECTION ONCE
Refills: 0 | Status: COMPLETED | OUTPATIENT
Start: 2023-11-18 | End: 2023-11-18

## 2023-11-18 RX ORDER — POTASSIUM CHLORIDE 20 MEQ
20 PACKET (EA) ORAL ONCE
Refills: 0 | Status: COMPLETED | OUTPATIENT
Start: 2023-11-18 | End: 2023-11-18

## 2023-11-18 RX ADMIN — Medication 80 MILLIGRAM(S): at 05:33

## 2023-11-18 RX ADMIN — CHLORHEXIDINE GLUCONATE 1 APPLICATION(S): 213 SOLUTION TOPICAL at 05:32

## 2023-11-18 RX ADMIN — ATORVASTATIN CALCIUM 80 MILLIGRAM(S): 80 TABLET, FILM COATED ORAL at 22:49

## 2023-11-18 RX ADMIN — Medication 100 MILLIGRAM(S): at 11:37

## 2023-11-18 RX ADMIN — Medication 80 MILLIGRAM(S): at 17:18

## 2023-11-18 RX ADMIN — Medication 25 MILLIGRAM(S): at 05:33

## 2023-11-18 RX ADMIN — APIXABAN 5 MILLIGRAM(S): 2.5 TABLET, FILM COATED ORAL at 05:32

## 2023-11-18 RX ADMIN — CLOPIDOGREL BISULFATE 75 MILLIGRAM(S): 75 TABLET, FILM COATED ORAL at 11:37

## 2023-11-18 RX ADMIN — APIXABAN 5 MILLIGRAM(S): 2.5 TABLET, FILM COATED ORAL at 17:18

## 2023-11-18 RX ADMIN — PANTOPRAZOLE SODIUM 40 MILLIGRAM(S): 20 TABLET, DELAYED RELEASE ORAL at 05:32

## 2023-11-18 RX ADMIN — Medication 1: at 11:37

## 2023-11-18 RX ADMIN — Medication 20 MILLIEQUIVALENT(S): at 17:17

## 2023-11-18 RX ADMIN — Medication 25 GRAM(S): at 17:17

## 2023-11-18 NOTE — DISCHARGE NOTE PROVIDER - NSDCFUSCHEDAPPT_GEN_ALL_CORE_FT
Talia Eduardo  [facility]  RHEUM [address]  Scheduled Appointment: 11/28/2023    Ivet Vogel  [facility]  INTMED [address]  Scheduled Appointment: 12/12/2023    Agustin Hameed  [facility]  CARDIOLOGY [address]   Scheduled Appointment: 01/05/2024    Agustin Oliver  [facility]  OPHTHALM [address]  Scheduled Appointment: 01/12/2024     Agustin Hameed  [facility]  CARDIOLOGY [address]   Scheduled Appointment: 11/24/2023    Talia Eduardo  [facility]  RHEUM [address]  Scheduled Appointment: 11/28/2023    Ivet Vogel  [facility]  INTMED [address]  Scheduled Appointment: 12/12/2023    Agustin Hameed  [facility]  CARDIOLOGY [address]   Scheduled Appointment: 01/05/2024    Agustin Oliver  [facility]  OPHTHALM [address]  Scheduled Appointment: 01/12/2024

## 2023-11-18 NOTE — DISCHARGE NOTE PROVIDER - NSDCCPCAREPLAN_GEN_ALL_CORE_FT
PRINCIPAL DISCHARGE DIAGNOSIS  Diagnosis: Hypotension  Assessment and Plan of Treatment: Hypotension resolved   take meds as directed   follow up with Dr Hamede     PRINCIPAL DISCHARGE DIAGNOSIS  Diagnosis: Hypotension  Assessment and Plan of Treatment: Hypotension resolved.  Take meds as directed.   Follow up with Dr Hameed      SECONDARY DISCHARGE DIAGNOSES  Diagnosis: Atrial flutter  Assessment and Plan of Treatment: Patient had 24 beats of WCT on 11/19/2023.   Cardiology evaluated patient.   S/P PCI to LAD on 11/21/2023. Plan to resume Eliquis on 11/22/2023.   Follow-up outpatient with cardiology.    Diagnosis: Bilateral ankle pain  Assessment and Plan of Treatment: Patient reported bilateral ankle pain.   X-ray negative for fracture.   Bilateral ankles wrapped and pt seen by physical therapy.   Patient treated for gout.     PRINCIPAL DISCHARGE DIAGNOSIS  Diagnosis: Hypotension  Assessment and Plan of Treatment: Hypotension resolved.  Take meds as directed.   Follow up with Dr Hameed      SECONDARY DISCHARGE DIAGNOSES  Diagnosis: Atrial flutter  Assessment and Plan of Treatment: Patient had 24 beats of WCT on 11/19/2023.   Cardiology evaluated patient.   S/P PCI to LAD on 11/21/2023. Plan to resume Eliquis on 11/22/2023.   Follow-up outpatient with cardiology.    Diagnosis: Bilateral ankle pain  Assessment and Plan of Treatment: Patient reported bilateral ankle pain.   X-ray negative for fracture.   Bilateral ankles wrapped and pt seen by physical therapy.   Patient treated for gout.  Patient to take colchicine for one week, in medication reconcilation and sent to pharmacy for gout treatment.     PRINCIPAL DISCHARGE DIAGNOSIS  Diagnosis: CAD (coronary artery disease)  Assessment and Plan of Treatment: You have a diagnosis of coronary artery disease and underwent a cardiac catheterization where you received a stents to your pLAD. You have been started on Aspirin 81mg daily, Plavix 75mg daily.  You should stop your aspirin after 1 week  Please avoid any heavy lifting (no more than 3 to 5 lbs), strenuous activity, bending, straining, or unnecessary stair climbing for 2 weeks. No driving for 2 days. You may shower 24 hours following the procedure but avoid baths/swimming for 1 week. If you develop any swelling, bleeding, hardening of the skin (hematoma formation), acute pain, numbness/tingling in your arm, or have any questions/concerns regarding your procedure, please call [facility] [phone]  NEVER miss a dose of Aspirin and Plavix to ensure your stent does not close. DO NOT STOP THESE MEDICATIONS FOR ANY REASON UNLESS OTHERWISE INDICATED BY YOUR CARDIOLOGIST BECAUSE THIS WILL PUT YOU AT RISK OF YOUR STENT CLOSING AND HAVING A HEART ATTACK OR SUDDEN SEVERE LEG PAIN DUE TO BLOCKAGE OF BLOOD FLOW TO LEG.  You have been given a Stent Card to carry with you in your wallet.  Make Photocopies or take a picture of card so you have a backup copy.  This card has important information for any possible future Radiology/MRI studies.    Please make a follow up appointment with your cardiologist within 1-2 weeks of your discharge. All of your prescriptions have been sent electronically to your pharmacy.      SECONDARY DISCHARGE DIAGNOSES  Diagnosis: Atrial flutter  Assessment and Plan of Treatment: Patient had 24 beats of WCT on 11/19/2023.   Cardiology evaluated patient.   S/P PCI to LAD on 11/21/2023. Plan to resume Eliquis on 11/22/2023.   Follow-up outpatient with cardiology.    Diagnosis: Hypotension  Assessment and Plan of Treatment: Hypotension resolved.  Take meds as directed.   Follow up with Dr Hameed    Diagnosis: Atrial flutter  Assessment and Plan of Treatment: You did have some irregular heart rhythm while in the hospital.  It was 24 beats of WCT you were seen by an EP doctor who recommended to increase your metoprolol ER to 50 mg.   Please also continue your Eliquis as before to decrease your risk of having a stroke.    Diagnosis: RAZIA (acute kidney injury)  Assessment and Plan of Treatment: You had an acute kidney injury due to dehydration. Please continue to hold your water pills (torsemide and spironalactone.    Diagnosis: Bilateral ankle pain  Assessment and Plan of Treatment: Patient reported bilateral ankle pain.   X-ray negative for fracture.   Bilateral ankles wrapped and pt seen by physical therapy.   Patient treated for gout.  Patient to take colchicine for one week, in medication reconcilation and sent to pharmacy for gout treatment.

## 2023-11-18 NOTE — PROGRESS NOTE ADULT - SUBJECTIVE AND OBJECTIVE BOX
Patient is a 78y old  Male who presents with a chief complaint of hypotension (18 Nov 2023 12:38)    Patient seen and examined at bedside.  Pt noted resting comfortably in bed, no new complaints reported.      MEDICATIONS  (STANDING):  allopurinol 100 milliGRAM(s) Oral daily  apixaban 5 milliGRAM(s) Oral two times a day  atorvastatin 80 milliGRAM(s) Oral at bedtime  chlorhexidine 2% Cloths 1 Application(s) Topical <User Schedule>  clopidogrel Tablet 75 milliGRAM(s) Oral daily  dextrose 5%. 1000 milliLiter(s) (50 mL/Hr) IV Continuous <Continuous>  dextrose 5%. 1000 milliLiter(s) (100 mL/Hr) IV Continuous <Continuous>  dextrose 50% Injectable 25 Gram(s) IV Push once  dextrose 50% Injectable 12.5 Gram(s) IV Push once  dextrose 50% Injectable 25 Gram(s) IV Push once  glucagon  Injectable 1 milliGRAM(s) IntraMuscular once  insulin lispro (ADMELOG) corrective regimen sliding scale   SubCutaneous Before meals and at bedtime  metoprolol succinate ER 25 milliGRAM(s) Oral daily  pantoprazole    Tablet 40 milliGRAM(s) Oral before breakfast  sotalol. 80 milliGRAM(s) Oral every 12 hours    MEDICATIONS  (PRN):  acetaminophen     Tablet .. 650 milliGRAM(s) Oral every 6 hours PRN Temp greater or equal to 38C (100.4F), Mild Pain (1 - 3)  aluminum hydroxide/magnesium hydroxide/simethicone Suspension 30 milliLiter(s) Oral every 4 hours PRN Dyspepsia  dextrose Oral Gel 15 Gram(s) Oral once PRN Blood Glucose LESS THAN 70 milliGRAM(s)/deciliter  melatonin 3 milliGRAM(s) Oral at bedtime PRN Insomnia  ondansetron Injectable 4 milliGRAM(s) IV Push every 8 hours PRN Nausea and/or Vomiting      Vital Signs Last 24 Hrs  T(C): 36.9 (18 Nov 2023 11:36), Max: 37.7 (17 Nov 2023 20:17)  T(F): 98.4 (18 Nov 2023 11:36), Max: 99.9 (17 Nov 2023 20:17)  HR: 73 (18 Nov 2023 11:36) (69 - 79)  BP: 107/62 (18 Nov 2023 11:36) (97/58 - 117/69)  BP(mean): --  RR: 18 (18 Nov 2023 11:36) (18 - 18)  SpO2: 96% (18 Nov 2023 11:36) (95% - 96%)    Parameters below as of 18 Nov 2023 11:36  Patient On (Oxygen Delivery Method): room air        PE  Awake, alert  Anicteric, MMM  RRR  CTAB  Abd soft, NT, ND  No c/c/e  No rash grossly  FROM                          10.0   7.68  )-----------( 202      ( 18 Nov 2023 07:24 )             30.5       11-18    138  |  101  |  22  ----------------------------<  119<H>  3.6   |  26  |  0.97    Ca    8.8      18 Nov 2023 07:25    TPro  6.0  /  Alb  2.9<L>  /  TBili  0.5  /  DBili  x   /  AST  38  /  ALT  34  /  AlkPhos  160<H>  11-18

## 2023-11-18 NOTE — DISCHARGE NOTE PROVIDER - NSDCFUADDINST_GEN_ALL_CORE_FT
Important Medication Changes and Reason:  Patient will get triple therapy of Aspirin, Plavix and Eliquis. Stop aspirin after one week, stop date 11/29/2023.  Do not resume Torsemide.   Metoprolol dose is now Metoprolol ER 50mg daily.   Crestor dose is now 40mg daily.   Begin colchicine 0.6mg daily for ankle pain/gout.     Active or Pending Issues Requiring Follow-up:   Follow-up with Dr Hameed.

## 2023-11-18 NOTE — DISCHARGE NOTE PROVIDER - ATTENDING DISCHARGE PHYSICAL EXAMINATION:
T(C): 36.8 (11-22 @ 16:01), Max: 36.8 (11-21 @ 21:27)   HR: 81   BP: 110/69   RR: 18   SpO2: 99%    PHYSICAL EXAM:    CONSTITUTIONAL: NAD, well-developed, well-groomed  EYES: PERRLA; conjunctiva and sclera clear  ENMT: Moist oral mucosa, no pharyngeal injection or exudates; normal dentition  NECK: Supple, no palpable masses; no thyromegaly  RESPIRATORY: Normal respiratory effort; lungs are clear to auscultation bilaterally  CARDIOVASCULAR: Regular rate and rhythm, normal S1 and S2, no murmur/rub/gallop; No lower extremity edema; Peripheral pulses are 2+ bilaterally  ABDOMEN: Nontender to palpation, normoactive bowel sounds, no rebound/guarding; No hepatosplenomegaly  MUSCULOSKELETAL:  no clubbing or cyanosis of digits; no joint swelling or tenderness to palpation; improved ankle pain with improved range of motion  PSYCH: A+O to person, place, and time; affect appropriate  NEUROLOGY: CN 2-12 are intact and symmetric; no gross sensory deficits   SKIN: No rashes; no palpable lesions

## 2023-11-18 NOTE — DISCHARGE NOTE PROVIDER - HOSPITAL COURSE
78m hx HCC s/p recent ?embolization vs. radiotherapy, HTN, LE, aflutter s/p DCCV on eliquis, LE edema on diuretics presenting with reported hypotension and feelings of fatigue before and after OP stress test. Notes decreased appetite ever since his HCC treatment as food no longer has any taste. Denies any localizing infectious symptoms- no urinary/bowel symptoms, no localized aches/pains, no CP/SOB, no lightheadedness/presyncope, no falls/head trauma. States he received 1.5L IV fluids in Dr. Hameed's office, after which his fatigue abated.     Hospital Course:  78m hx HCC s/p recent ?embolization vs. radiotherapy, HTN, LE, aflutter s/p DCCV on eliquis, LE edema on diuretics presenting with reported hypotension and feelings of fatigue before and after OP stress test  hypotension  Required 1.5L fluids reportedly after the study.  Stress test negative  Cards consulted   history suggest hypovolemia as cause for presentation- likely in setting of worsening appetite and concurrent diuretic use  orthostatics-- negative     Important Medication Changes and Reason:    Active or Pending Issues Requiring Follow-up: Dr Hameed     Advanced Directives:   [x ] Full code  [ ] DNR  [ ] Hospice    Discharge Diagnoses:  Hypotension likely 2/2 hypovolemia.           78m hx HCC s/p recent ?embolization vs. radiotherapy, HTN, LE, aflutter s/p DCCV on eliquis, LE edema on diuretics presenting with reported hypotension and feelings of fatigue before and after OP stress test. Notes decreased appetite ever since his HCC treatment as food no longer has any taste. Patient received 1.5L of IVF in Dr. Hameed's office and reported improved fatigue after.     Hospital Course:  78m hx HCC s/p recent ?embolization vs. radiotherapy, HTN, LE, aflutter s/p DCCV on eliquis, LE edema on diuretics presenting with reported hypotension and feelings of fatigue before and after OP stress test.   On 11/19/2023 patient had 24 beats of WCT, asymptomatic. Cardiology followed patient and recommended cardiac cath. Cardiac cath done on 11/21/2023, s/p PCI of LAD.   Patient also reported bilateral ankle pain, x-ray negative for fracture. Bilateral ankles wrapped with ACE bandage and patient evaluated by PT. Patient treated for gout with colchicine.     Important Medication Changes and Reason:  Do not resume Torsemide.     Active or Pending Issues Requiring Follow-up:   Follow-up with Dr Hameed.  Follow-up with cardiologist.     Advanced Directives:   [x ] Full code  [ ] DNR  [ ] Hospice    Discharge Diagnoses:  Hypotension  Atrial flutter   Ankle pain     78m hx HCC s/p recent ?embolization vs. radiotherapy, HTN, LE, aflutter s/p DCCV on eliquis, LE edema on diuretics presenting with reported hypotension and feelings of fatigue before and after OP stress test. Notes decreased appetite ever since his HCC treatment as food no longer has any taste. Patient received 1.5L of IVF in Dr. Hameed's office and reported improved fatigue after.     Hospital Course:  78m hx HCC s/p recent ?embolization vs. radiotherapy, HTN, LE, aflutter s/p DCCV on eliquis, LE edema on diuretics presenting with reported hypotension and feelings of fatigue before and after OP stress test.   On 11/19/2023 patient had 24 beats of WCT, asymptomatic. Cardiology followed patient and recommended cardiac cath. Cardiac cath done on 11/21/2023, s/p PCI of LAD.   Patient also reported bilateral ankle pain, x-ray negative for fracture. Bilateral ankles wrapped with ACE bandage and patient evaluated by PT. Patient treated for gout with colchicine.     Important Medication Changes and Reason:  Do not resume Torsemide.     Active or Pending Issues Requiring Follow-up:   Follow-up with Dr Hameed.      Advanced Directives:   [x ] Full code  [ ] DNR  [ ] Hospice    Discharge Diagnoses:  Hypotension  Atrial flutter   Ankle pain     78m hx HCC s/p recent ?embolization vs. radiotherapy, HTN, LE, aflutter s/p DCCV on eliquis, LE edema on diuretics presenting with reported hypotension and feelings of fatigue before and after OP stress test. Notes decreased appetite ever since his HCC treatment as food no longer has any taste. Patient received 1.5L of IVF in Dr. Hameed's office and reported improved fatigue after.     Hospital Course:  78m hx HCC s/p recent ?embolization vs. radiotherapy, HTN, LE, aflutter s/p DCCV on eliquis, LE edema on diuretics presenting with reported hypotension and feelings of fatigue before and after OP stress test.   On 11/19/2023 patient had 24 beats of WCT, asymptomatic. Cardiology followed patient and recommended cardiac cath. Cardiac cath done on 11/21/2023, s/p PCI of LAD.   Patient also reported bilateral ankle pain, x-ray negative for fracture. Bilateral ankles wrapped with ACE bandage and patient evaluated by PT. Patient treated for gout with colchicine.     Important Medication Changes and Reason:  Patient will get triple therapy of Aspirin, Plavix and Eliquis. Stop aspirin after one week, date 11/29/2023.  Do not resume Torsemide.   Metoprolol dose is now Metoprolol ER 50mg daily.   Crestor dose is now 40mg daily.   Begin colchicine 0.6mg daily for ankle pain/gout.     Active or Pending Issues Requiring Follow-up:   Follow-up with Dr Hameed.    Advanced Directives:   [x ] Full code  [ ] DNR  [ ] Hospice    Discharge Diagnoses:  Hypotension  Atrial flutter   Ankle pain     78m hx HCC s/p recent ?embolization vs. radiotherapy, HTN, LE, aflutter s/p DCCV on eliquis, LE edema on diuretics presenting with reported hypotension and feelings of fatigue before and after OP stress test. Notes decreased appetite ever since his HCC treatment as food no longer has any taste. Patient received 1.5L of IVF in Dr. Hameed's office and reported improved fatigue after.     Hospital Course:  78m hx HCC s/p recent ?embolization vs. radiotherapy, HTN, LE, aflutter s/p DCCV on eliquis, LE edema on diuretics presenting with reported hypotension and feelings of fatigue before and after OP stress test.   On 11/19/2023 patient had 24 beats of WCT, asymptomatic, EP consulted the recommended to increase metoprolol ER to 50 mg. Cardiology followed patient and recommended cardiac cath. Cardiac cath done on 11/21/2023, s/p PCI of LAD. Patient also reported bilateral ankle pain, x-ray negative for fracture. Bilateral ankles wrapped with ACE bandage and patient evaluated by PT. Patient treated for gout with colchicine with symptomatic improvement. He also had an RAZIA and hypotension which was thought to be from over diuresis so his diuretics were stopped with improvement of his Cr and improvement of his BP. He was on the diuretics for leg edema.    Important Medication Changes and Reason:  Patient will get triple therapy of Aspirin, Plavix and Eliquis. Stop aspirin after one week, date 11/29/2023.  Do not resume Torsemide.   Metoprolol dose is now Metoprolol ER 50mg daily.   Crestor dose is now 40mg daily.   Begin colchicine 0.6mg daily for ankle pain/gout.     Active or Pending Issues Requiring Follow-up:   Follow-up with Dr Hameed.    Advanced Directives:   [x ] Full code  [ ] DNR  [ ] Hospice    Discharge Diagnoses:  Coronary artery disease  RAZIA  Atrial flutter   Acute gout flare

## 2023-11-18 NOTE — DISCHARGE NOTE PROVIDER - NSDCFUADDAPPT_GEN_ALL_CORE_FT
APPTS ARE READY TO BE MADE: [ x] YES    Best Family or Patient Contact (if needed):    Additional Information about above appointments (if needed):    1:   2:   3:     Other comments or requests:    APPTS ARE READY TO BE MADE: [ x] YES    Best Family or Patient Contact (if needed):    Additional Information about above appointments (if needed):    1:   2:   3:     Other comments or requests:   Patient was scheduled for an appointment on 11/24 12:15p at [address] with Dr. Hameed. Patient/Caregiver was advised of appointment details.    Patient/Caregiver was provided with follow up request details and prefers to call the providers office on their own to schedule.    APPTS ARE READY TO BE MADE: [X] YES    Best Family or Patient Contact (if needed):    Additional Information about above appointments (if needed):    1: Follow-up with Dr. Hameed.  2:   3:     Other comments or requests:   Patient was scheduled for an appointment on 11/24 12:15p at [address] [facility] with Dr. Hameed. Patient/Caregiver was advised of appointment details.    Patient/Caregiver was provided with follow up request details and prefers to call the providers office on their own to schedule.    APPTS ARE READY TO BE MADE: [X] YES    Best Family or Patient Contact (if needed):    Additional Information about above appointments (if needed):    1: Follow-up with Dr. Hameed.  2: Follow-up with oncologist.   3:     Other comments or requests:   Patient was scheduled for an appointment on 11/24 12:15p at [address] [facility] with Dr. Hameed. Patient/Caregiver was advised of appointment details.    Patient/Caregiver was provided with follow up request details and prefers to call the providers office on their own to schedule.

## 2023-11-18 NOTE — PROGRESS NOTE ADULT - SUBJECTIVE AND OBJECTIVE BOX
PROGRESS NOTE:     Patient is a 78y old  Male who presents with a chief complaint of hypotension (18 Nov 2023 12:55)      SUBJECTIVE / OVERNIGHT EVENTS:  Patient seen and evaluated at bedside. Denies lightheadedness, denies dizziness, denies chest pain, denies SOB.     ADDITIONAL REVIEW OF SYSTEMS:    MEDICATIONS  (STANDING):  allopurinol 100 milliGRAM(s) Oral daily  apixaban 5 milliGRAM(s) Oral two times a day  atorvastatin 80 milliGRAM(s) Oral at bedtime  chlorhexidine 2% Cloths 1 Application(s) Topical <User Schedule>  clopidogrel Tablet 75 milliGRAM(s) Oral daily  dextrose 5%. 1000 milliLiter(s) (50 mL/Hr) IV Continuous <Continuous>  dextrose 5%. 1000 milliLiter(s) (100 mL/Hr) IV Continuous <Continuous>  dextrose 50% Injectable 25 Gram(s) IV Push once  dextrose 50% Injectable 12.5 Gram(s) IV Push once  dextrose 50% Injectable 25 Gram(s) IV Push once  glucagon  Injectable 1 milliGRAM(s) IntraMuscular once  insulin lispro (ADMELOG) corrective regimen sliding scale   SubCutaneous Before meals and at bedtime  metoprolol succinate ER 25 milliGRAM(s) Oral daily  pantoprazole    Tablet 40 milliGRAM(s) Oral before breakfast  potassium chloride    Tablet ER 20 milliEquivalent(s) Oral once  sotalol. 80 milliGRAM(s) Oral every 12 hours    MEDICATIONS  (PRN):  acetaminophen     Tablet .. 650 milliGRAM(s) Oral every 6 hours PRN Temp greater or equal to 38C (100.4F), Mild Pain (1 - 3)  aluminum hydroxide/magnesium hydroxide/simethicone Suspension 30 milliLiter(s) Oral every 4 hours PRN Dyspepsia  dextrose Oral Gel 15 Gram(s) Oral once PRN Blood Glucose LESS THAN 70 milliGRAM(s)/deciliter  melatonin 3 milliGRAM(s) Oral at bedtime PRN Insomnia  ondansetron Injectable 4 milliGRAM(s) IV Push every 8 hours PRN Nausea and/or Vomiting      CAPILLARY BLOOD GLUCOSE      POCT Blood Glucose.: 171 mg/dL (18 Nov 2023 11:27)  POCT Blood Glucose.: 123 mg/dL (18 Nov 2023 07:26)  POCT Blood Glucose.: 125 mg/dL (17 Nov 2023 21:18)  POCT Blood Glucose.: 148 mg/dL (17 Nov 2023 17:17)    I&O's Summary    17 Nov 2023 07:01  -  18 Nov 2023 07:00  --------------------------------------------------------  IN: 840 mL / OUT: 730 mL / NET: 110 mL    18 Nov 2023 07:01  -  18 Nov 2023 14:37  --------------------------------------------------------  IN: 480 mL / OUT: 0 mL / NET: 480 mL        PHYSICAL EXAM:  Vital Signs Last 24 Hrs  T(C): 36.9 (18 Nov 2023 11:36), Max: 37.7 (17 Nov 2023 20:17)  T(F): 98.4 (18 Nov 2023 11:36), Max: 99.9 (17 Nov 2023 20:17)  HR: 73 (18 Nov 2023 11:36) (69 - 79)  BP: 107/62 (18 Nov 2023 11:36) (97/58 - 117/69)  BP(mean): --  RR: 18 (18 Nov 2023 11:36) (18 - 18)  SpO2: 96% (18 Nov 2023 11:36) (95% - 96%)    Parameters below as of 18 Nov 2023 11:36  Patient On (Oxygen Delivery Method): room air        CONSTITUTIONAL: NAD  RESPIRATORY: Normal respiratory effort; lungs are clear to auscultation bilaterally, no wheezes/crackles   CARDIOVASCULAR: Regular rate and rhythm, normal S1 and S2, no murmur; No lower extremity edema.  ABDOMEN: Nontender to palpation, normoactive bowel sounds, no rebound/guarding.  PSYCH: A+O to person, place, and time; affect appropriate    LABS:                        10.0   7.68  )-----------( 202      ( 18 Nov 2023 07:24 )             30.5     11-18    138  |  101  |  22  ----------------------------<  119<H>  3.6   |  26  |  0.97    Ca    8.8      18 Nov 2023 07:25    TPro  6.0  /  Alb  2.9<L>  /  TBili  0.5  /  DBili  x   /  AST  38  /  ALT  34  /  AlkPhos  160<H>  11-18          Urinalysis Basic - ( 18 Nov 2023 07:25 )    Color: x / Appearance: x / SG: x / pH: x  Gluc: 119 mg/dL / Ketone: x  / Bili: x / Urobili: x   Blood: x / Protein: x / Nitrite: x   Leuk Esterase: x / RBC: x / WBC x   Sq Epi: x / Non Sq Epi: x / Bacteria: x        Culture - Urine (collected 16 Nov 2023 16:26)  Source: Clean Catch Clean Catch (Midstream)  Final Report (18 Nov 2023 12:20):    <10,000 CFU/mL Normal Urogenital [place]    Culture - Blood (collected 16 Nov 2023 13:30)  Source: .Blood Blood-Peripheral  Preliminary Report (17 Nov 2023 18:02):    No growth at 24 hours    Culture - Blood (collected 16 Nov 2023 13:28)  Source: .Blood Blood-Peripheral  Preliminary Report (17 Nov 2023 18:02):    No growth at 24 hours        RADIOLOGY & ADDITIONAL TESTS:  Results Reviewed:   Imaging Personally Reviewed:  Electrocardiogram Personally Reviewed:    COORDINATION OF CARE:  Care Discussed with Consultants/Other Providers [Y/N]:  Prior or Outpatient Records Reviewed [Y/N]:

## 2023-11-18 NOTE — DISCHARGE NOTE PROVIDER - CARE PROVIDER_API CALL
Agustin Hameed  Cardiology  [address] [facility] [address], [address]  [place] [address]  Phone: [phone]  Fax: [phone]  Follow Up Time:     YUSRA ORO, Phys,    Phone: ()-  Fax: ()-  Follow Up Time:

## 2023-11-18 NOTE — PROGRESS NOTE ADULT - ASSESSMENT
NANDO HERNANDEZ is a 78y Male who presents with a chief complaint of hypotension.    Metastatic Pancreatic Neuroendocrine Tumor  ·	Patient follows with Dr. Sophia Prasad, [facility].  ·	Recent progression of disease; last received Lutetium Edith-177 LUTATHERA on October 20th.  ·	No systemic therapy while inpatient or during rehabilitation  ·	ongoing care after discharge    Anemia  ·	at baseline  ·	In the setting of inflammation, active malignancy  ·	Monitor and maintain HGB > 7    Hypotension  ·	cardiology consulted  ·	BP now recovering with fluid resuscitation. Patient reports significantly poor appetite and decreased PO intake since hospital discharge.  ·	Hypotension likely 2/2 hypovolemia.  ·	Follow up urine and blood cultures.     Chest Pain  ·	now resolved  ·	Reported as mild intensity, midsternal, non-exertional and self resolves on its own.  ·	TTE from 10/25 shows preserved EF and no WMA    will follow,    Christen Rosales NP  Hematology/ Oncology  [facility]  [phone] (office)  [phone] (alt office)  Evenings and weekends please call MD on call or office

## 2023-11-18 NOTE — PHYSICAL THERAPY INITIAL EVALUATION ADULT - ADDITIONAL COMMENTS
Pt lives in a private house with spouse with one flight of steps inside but has a chair lift. Pt was Ind with all ADLs and amb with quad cane. Pt also owns RW

## 2023-11-18 NOTE — PROGRESS NOTE ADULT - SUBJECTIVE AND OBJECTIVE BOX
DATE OF SERVICE: 11-18-23 @ 12:39    Patient is a 78y old  Male who presents with a chief complaint of hypotension (17 Nov 2023 14:30)      INTERVAL HISTORY: no complaints     REVIEW OF SYSTEMS:  CONSTITUTIONAL: No weakness  EYES/ENT: No visual changes;  No throat pain   NECK: No pain or stiffness  RESPIRATORY: No cough, wheezing; No shortness of breath  CARDIOVASCULAR: No chest pain or palpitations  GASTROINTESTINAL: No abdominal  pain. No nausea, vomiting, or hematemesis  GENITOURINARY: No dysuria, frequency or hematuria  NEUROLOGICAL: No stroke like symptoms  SKIN: No rashes    	  MEDICATIONS:  metoprolol succinate ER 25 milliGRAM(s) Oral daily  sotalol. 80 milliGRAM(s) Oral every 12 hours        PHYSICAL EXAM:  T(C): 36.9 (11-18-23 @ 11:36), Max: 37.7 (11-17-23 @ 20:17)  HR: 73 (11-18-23 @ 11:36) (69 - 79)  BP: 107/62 (11-18-23 @ 11:36) (97/58 - 117/69)  RR: 18 (11-18-23 @ 11:36) (18 - 18)  SpO2: 96% (11-18-23 @ 11:36) (95% - 96%)  Wt(kg): --  I&O's Summary    17 Nov 2023 07:01  -  18 Nov 2023 07:00  --------------------------------------------------------  IN: 840 mL / OUT: 730 mL / NET: 110 mL    18 Nov 2023 07:01  -  18 Nov 2023 12:39  --------------------------------------------------------  IN: 240 mL / OUT: 0 mL / NET: 240 mL          Appearance: In no distress	  HEENT:    PERRL, EOMI	  Cardiovascular:  S1 S2, No JVD  Respiratory: Lungs clear to auscultation	  Gastrointestinal:  Soft, Non-tender, + BS	  Vascularature:  No edema of LE  Psychiatric: Appropriate affect   Neuro: no acute focal deficits                               10.0   7.68  )-----------( 202      ( 18 Nov 2023 07:24 )             30.5     11-18    138  |  101  |  22  ----------------------------<  119<H>  3.6   |  26  |  0.97    Ca    8.8      18 Nov 2023 07:25    TPro  6.0  /  Alb  2.9<L>  /  TBili  0.5  /  DBili  x   /  AST  38  /  ALT  34  /  AlkPhos  160<H>  11-18        Labs personally reviewed      ASSESSMENT/PLAN: 	      78-year-old male multiple medical problems history of hepatocellular carcinoma status post radiation therapy, AF s/p DCCV on Eliquis who was found to be hypotensive following NST. Patient has reported general weakness, fatigue, lightheadedness since being discharged from hospital. Reports mild chest discomfort in midsternal region. Reports dyspnea with minimal exertion. Also reports significant lack of appetite and poor PO intake. No dark or bloody stool, nausea or vomiting.       Problem/Plan - 1:  ·  Problem: Hypotension  - Occurred s/p NST (no exercise component conducted)--> NST normal study as per Dr. Hameed  - Trop 91 --> lower than previous admission   - BUN/Cr elevated  - BP now recovering with fluid resuscitation. Patient reports significantly poor appetite and decreased PO intake since hospital discharge.  - Hypotension likely 2/2 hypovolemia.  - Follow up urine and blood cultures.  - rec adrenal insuffiencey work up      Problem/Plan - 2:  ·  Problem: Chest Pain  ·  Plan: Reported as mild intensity, midsternal, non-exertional and self resolves on its own.  - Trop 91 (similar to prior admission)  - Hx of recent PCI  - TTE from 10/25 shows preserved EF and no WMA  - NST 11/16 wnl  - CP unlikely cardiac in etiology.   - Chest pain now resolved.      Problem/Plan - 3:  ·  Problem: Shortness of Breath  ·  Plan: ECG non-ischemic  - Prior TTE wnl.   - CXR shows clear lungs  - Patient was not discharged on his usual Torsemide following previous hospital admission d/t RAZIA.   - BNP 2442 (lower than previous DC)  - Will likely need to resume Torsemide but at lower dose.      Problem/Plan - 4:  ·  Problem: CAD.   ·  Plan: Recent PCI to pLAD in June 2023  - ECG non-ischemic  - Trop as noted above  - c/w Plavix and statin     Problem/Plan - 5:  ·  Problem: RAZIA   ·  Plan: RAZIA possibly pre-renal d/t poor intake.   - Cont to hold spironolactone also i/s/o hyperkalemia  - Was restarted on Torsemide 40mg PO BID as OP -- cont to hold  - s/p IVF     Problem/Plan - 6:  ·  Problem: Atrial Fibrillation  - s/p succesful DCCV 10/31  - Cont Eliquis 5mg BID  - Cont Toprol 25mg PO daily  - C/w of Sotalol 80mg PO BID                MARY Feliciano DO [facility]  Cardiovascular Medicine  [address] [place] [address], Suite [facility]  Office: [phone]  Available via call/text on [employer] Teams  DATE OF SERVICE: 11-18-23 @ 12:39    Patient is a 78y old  Male who presents with a chief complaint of hypotension (17 Nov 2023 14:30)      INTERVAL HISTORY: no complaints     REVIEW OF SYSTEMS:  CONSTITUTIONAL: No weakness  EYES/ENT: No visual changes;  No throat pain   NECK: No pain or stiffness  RESPIRATORY: No cough, wheezing; No shortness of breath  CARDIOVASCULAR: No chest pain or palpitations  GASTROINTESTINAL: No abdominal  pain. No nausea, vomiting, or hematemesis  GENITOURINARY: No dysuria, frequency or hematuria  NEUROLOGICAL: No stroke like symptoms  SKIN: No rashes    	  MEDICATIONS:  metoprolol succinate ER 25 milliGRAM(s) Oral daily  sotalol. 80 milliGRAM(s) Oral every 12 hours        PHYSICAL EXAM:  T(C): 36.9 (11-18-23 @ 11:36), Max: 37.7 (11-17-23 @ 20:17)  HR: 73 (11-18-23 @ 11:36) (69 - 79)  BP: 107/62 (11-18-23 @ 11:36) (97/58 - 117/69)  RR: 18 (11-18-23 @ 11:36) (18 - 18)  SpO2: 96% (11-18-23 @ 11:36) (95% - 96%)  Wt(kg): --  I&O's Summary    17 Nov 2023 07:01  -  18 Nov 2023 07:00  --------------------------------------------------------  IN: 840 mL / OUT: 730 mL / NET: 110 mL    18 Nov 2023 07:01  -  18 Nov 2023 12:39  --------------------------------------------------------  IN: 240 mL / OUT: 0 mL / NET: 240 mL          Appearance: In no distress	  HEENT:    PERRL, EOMI	  Cardiovascular:  S1 S2, No JVD  Respiratory: Lungs clear to auscultation	  Gastrointestinal:  Soft, Non-tender, + BS	  Vascularature:  No edema of LE  Psychiatric: Appropriate affect   Neuro: no acute focal deficits                               10.0   7.68  )-----------( 202      ( 18 Nov 2023 07:24 )             30.5     11-18    138  |  101  |  22  ----------------------------<  119<H>  3.6   |  26  |  0.97    Ca    8.8      18 Nov 2023 07:25    TPro  6.0  /  Alb  2.9<L>  /  TBili  0.5  /  DBili  x   /  AST  38  /  ALT  34  /  AlkPhos  160<H>  11-18        Labs personally reviewed      ASSESSMENT/PLAN: 	      78-year-old male multiple medical problems history of hepatocellular carcinoma status post radiation therapy, AF s/p DCCV on Eliquis who was found to be hypotensive following NST. Patient has reported general weakness, fatigue, lightheadedness since being discharged from hospital. Reports mild chest discomfort in midsternal region. Reports dyspnea with minimal exertion. Also reports significant lack of appetite and poor PO intake. No dark or bloody stool, nausea or vomiting.       Problem/Plan - 1:  ·  Problem: Hypotension  - Occurred s/p NST (no exercise component conducted)--> NST normal study as per Dr. Hameed  - Trop 91 --> lower than previous admission   - BUN/Cr elevated  - BP now recovering with fluid resuscitation. Patient reports significantly poor appetite and decreased PO intake since hospital discharge.  - Hypotension likely 2/2 hypovolemia.  - Follow up urine and blood cultures.  - rec adrenal insuffiencey work up      Problem/Plan - 2:  ·  Problem: Chest Pain  ·  Plan: Reported as mild intensity, midsternal, non-exertional and self resolves on its own.  - Trop 91 (similar to prior admission)  - Hx of recent PCI  - TTE from 10/25 shows preserved EF and no WMA  - NST 11/16 wnl  - CP unlikely cardiac in etiology.   - Chest pain now resolved.      Problem/Plan - 3:  ·  Problem: Shortness of Breath  ·  Plan: ECG non-ischemic  - Prior TTE wnl.   - CXR shows clear lungs  - Patient was not discharged on his usual Torsemide following previous hospital admission d/t RAZIA.   - BNP 2442 (lower than previous DC)  - Maintain off Torsemide and consider lower dose as OP if need be     Problem/Plan - 4:  ·  Problem: CAD.   ·  Plan: Recent PCI to pLAD in June 2023  - ECG non-ischemic  - Trop as noted above  - c/w Plavix and statin     Problem/Plan - 5:  ·  Problem: RAZIA   ·  Plan: RAZIA possibly pre-renal d/t poor intake.   - Cont to hold spironolactone also i/s/o hyperkalemia  - Was restarted on Torsemide 40mg PO BID as OP -- discontinue  - s/p IVF     Problem/Plan - 6:  ·  Problem: Atrial Fibrillation  - s/p succesful DCCV 10/31  - Cont Eliquis 5mg BID  - Cont Toprol 25mg PO daily  - C/w of Sotalol 80mg PO BID                MARY Feliciano DO [facility]  Cardiovascular Medicine  [address] [place] [address], Suite [facility]  Office: [phone]  Available via call/text on [employer] Teams

## 2023-11-18 NOTE — PHYSICAL THERAPY INITIAL EVALUATION ADULT - PERTINENT HX OF CURRENT PROBLEM, REHAB EVAL
Pt is a 77 y/o male admitted to [facility] on 11/16/23  pmh HCC s/p recent ?embolization vs. radiotherapy, HTN, LE, aflutter s/p DCCV on eliquis, LE edema on diuretics presenting with reported hypotension and feelings of fatigue before and after OP stress test. Notes decreased appetite ever since his HCC treatment as food no longer has any taste.   States he received 1.5L IV fluids in Dr. Hameed's office, after which his fatigue abated.   TTE: No Pericardial Effusion.

## 2023-11-18 NOTE — DISCHARGE NOTE PROVIDER - NSDCMRMEDTOKEN_GEN_ALL_CORE_FT
Aldactone 25 mg oral tablet: 1 tab(s) orally once a day  allopurinol 100 mg oral tablet: 2 tab(s) orally once a day  apixaban 5 mg oral tablet: 1 tab(s) orally 2 times a day  Crestor 20 mg oral tablet: 1 tab(s) orally once a day (at bedtime)  metFORMIN 1000 mg oral tablet: 1 tab(s) orally 2 times a day  Metoprolol Succinate ER 25 mg oral tablet, extended release: 1 tab(s) orally once a day  pantoprazole 40 mg oral delayed release tablet: 1 tab(s) orally once a day (before a meal)  Plavix 75 mg oral tablet: 1 tab(s) orally once a day  sotalol 80 mg oral tablet: 1 tab(s) orally every 12 hours   allopurinol 100 mg oral tablet: 2 tab(s) orally once a day  apixaban 5 mg oral tablet: 1 tab(s) orally 2 times a day  aspirin 81 mg oral tablet, chewable: 1 tab(s) orally once a day  colchicine 0.6 mg oral tablet: 1 tab(s) orally every 24 hours  Crestor 20 mg oral tablet: 1 tab(s) orally once a day (at bedtime)  metFORMIN 1000 mg oral tablet: 1 tab(s) orally 2 times a day  metoprolol succinate 50 mg oral tablet, extended release: 1 tab(s) orally once a day  pantoprazole 40 mg oral delayed release tablet: 1 tab(s) orally once a day (before a meal)  Plavix 75 mg oral tablet: 1 tab(s) orally once a day  sotalol 80 mg oral tablet: 1 tab(s) orally every 12 hours   allopurinol 100 mg oral tablet: 2 tab(s) orally once a day  apixaban 5 mg oral tablet: 1 tab(s) orally 2 times a day  aspirin 81 mg oral tablet, chewable: 1 tab(s) orally once a day  colchicine 0.6 mg oral tablet: 1 tab(s) orally every 24 hours  colchicine 0.6 mg oral tablet: 1 tab(s) orally every 24 hours  Crestor 20 mg oral tablet: 2 tab(s) orally once a day (at bedtime)  metFORMIN 1000 mg oral tablet: 1 tab(s) orally 2 times a day  metoprolol succinate 50 mg oral tablet, extended release: 1 tab(s) orally once a day  pantoprazole 40 mg oral delayed release tablet: 1 tab(s) orally once a day (before a meal)  Plavix 75 mg oral tablet: 1 tab(s) orally once a day  sotalol 80 mg oral tablet: 1 tab(s) orally every 12 hours   allopurinol 100 mg oral tablet: 2 tab(s) orally once a day  apixaban 5 mg oral tablet: 1 tab(s) orally 2 times a day  aspirin 81 mg oral tablet, chewable: 1 tab(s) orally once a day  colchicine 0.6 mg oral tablet: 1 tab(s) orally every 24 hours  Crestor 20 mg oral tablet: 2 tab(s) orally once a day (at bedtime)  metFORMIN 1000 mg oral tablet: 1 tab(s) orally 2 times a day  metoprolol succinate 50 mg oral tablet, extended release: 1 tab(s) orally once a day  pantoprazole 40 mg oral delayed release tablet: 1 tab(s) orally once a day (before a meal)  Plavix 75 mg oral tablet: 1 tab(s) orally once a day  sotalol 80 mg oral tablet: 1 tab(s) orally every 12 hours

## 2023-11-18 NOTE — PHYSICAL THERAPY INITIAL EVALUATION ADULT - GAIT DEVIATIONS NOTED, PT EVAL
decreased rodo/decreased velocity of limb motion/decreased step length/decreased weight-shifting ability

## 2023-11-19 DIAGNOSIS — M79.2 NEURALGIA AND NEURITIS, UNSPECIFIED: ICD-10-CM

## 2023-11-19 DIAGNOSIS — M25.572 PAIN IN LEFT ANKLE AND JOINTS OF LEFT FOOT: ICD-10-CM

## 2023-11-19 LAB
ANION GAP SERPL CALC-SCNC: 10 MMOL/L — SIGNIFICANT CHANGE UP (ref 5–17)
ANION GAP SERPL CALC-SCNC: 10 MMOL/L — SIGNIFICANT CHANGE UP (ref 5–17)
BUN SERPL-MCNC: 22 MG/DL — SIGNIFICANT CHANGE UP (ref 7–23)
BUN SERPL-MCNC: 22 MG/DL — SIGNIFICANT CHANGE UP (ref 7–23)
CALCIUM SERPL-MCNC: 9 MG/DL — SIGNIFICANT CHANGE UP (ref 8.4–10.5)
CALCIUM SERPL-MCNC: 9 MG/DL — SIGNIFICANT CHANGE UP (ref 8.4–10.5)
CHLORIDE SERPL-SCNC: 105 MMOL/L — SIGNIFICANT CHANGE UP (ref 96–108)
CHLORIDE SERPL-SCNC: 105 MMOL/L — SIGNIFICANT CHANGE UP (ref 96–108)
CO2 SERPL-SCNC: 26 MMOL/L — SIGNIFICANT CHANGE UP (ref 22–31)
CO2 SERPL-SCNC: 26 MMOL/L — SIGNIFICANT CHANGE UP (ref 22–31)
CREAT SERPL-MCNC: 0.92 MG/DL — SIGNIFICANT CHANGE UP (ref 0.5–1.3)
CREAT SERPL-MCNC: 0.92 MG/DL — SIGNIFICANT CHANGE UP (ref 0.5–1.3)
CRP SERPL-MCNC: 133 MG/L — HIGH (ref 0–4)
CRP SERPL-MCNC: 133 MG/L — HIGH (ref 0–4)
EGFR: 85 ML/MIN/1.73M2 — SIGNIFICANT CHANGE UP
EGFR: 85 ML/MIN/1.73M2 — SIGNIFICANT CHANGE UP
GLUCOSE BLDC GLUCOMTR-MCNC: 116 MG/DL — HIGH (ref 70–99)
GLUCOSE BLDC GLUCOMTR-MCNC: 116 MG/DL — HIGH (ref 70–99)
GLUCOSE BLDC GLUCOMTR-MCNC: 120 MG/DL — HIGH (ref 70–99)
GLUCOSE BLDC GLUCOMTR-MCNC: 120 MG/DL — HIGH (ref 70–99)
GLUCOSE BLDC GLUCOMTR-MCNC: 200 MG/DL — HIGH (ref 70–99)
GLUCOSE BLDC GLUCOMTR-MCNC: 200 MG/DL — HIGH (ref 70–99)
GLUCOSE BLDC GLUCOMTR-MCNC: 279 MG/DL — HIGH (ref 70–99)
GLUCOSE BLDC GLUCOMTR-MCNC: 279 MG/DL — HIGH (ref 70–99)
GLUCOSE SERPL-MCNC: 117 MG/DL — HIGH (ref 70–99)
GLUCOSE SERPL-MCNC: 117 MG/DL — HIGH (ref 70–99)
MAGNESIUM SERPL-MCNC: 1.8 MG/DL — SIGNIFICANT CHANGE UP (ref 1.6–2.6)
MAGNESIUM SERPL-MCNC: 1.8 MG/DL — SIGNIFICANT CHANGE UP (ref 1.6–2.6)
POTASSIUM SERPL-MCNC: 4.1 MMOL/L — SIGNIFICANT CHANGE UP (ref 3.5–5.3)
POTASSIUM SERPL-MCNC: 4.1 MMOL/L — SIGNIFICANT CHANGE UP (ref 3.5–5.3)
POTASSIUM SERPL-SCNC: 4.1 MMOL/L — SIGNIFICANT CHANGE UP (ref 3.5–5.3)
POTASSIUM SERPL-SCNC: 4.1 MMOL/L — SIGNIFICANT CHANGE UP (ref 3.5–5.3)
SODIUM SERPL-SCNC: 141 MMOL/L — SIGNIFICANT CHANGE UP (ref 135–145)
SODIUM SERPL-SCNC: 141 MMOL/L — SIGNIFICANT CHANGE UP (ref 135–145)
URATE SERPL-MCNC: 5.7 MG/DL — SIGNIFICANT CHANGE UP (ref 3.4–8.8)
URATE SERPL-MCNC: 5.7 MG/DL — SIGNIFICANT CHANGE UP (ref 3.4–8.8)

## 2023-11-19 PROCEDURE — 93010 ELECTROCARDIOGRAM REPORT: CPT

## 2023-11-19 PROCEDURE — 99233 SBSQ HOSP IP/OBS HIGH 50: CPT

## 2023-11-19 PROCEDURE — 73610 X-RAY EXAM OF ANKLE: CPT | Mod: 26,50

## 2023-11-19 RX ORDER — METOPROLOL TARTRATE 50 MG
50 TABLET ORAL DAILY
Refills: 0 | Status: DISCONTINUED | OUTPATIENT
Start: 2023-11-19 | End: 2023-11-22

## 2023-11-19 RX ORDER — LIDOCAINE 4 G/100G
1 CREAM TOPICAL DAILY
Refills: 0 | Status: DISCONTINUED | OUTPATIENT
Start: 2023-11-19 | End: 2023-11-22

## 2023-11-19 RX ORDER — METOPROLOL TARTRATE 50 MG
25 TABLET ORAL ONCE
Refills: 0 | Status: COMPLETED | OUTPATIENT
Start: 2023-11-19 | End: 2023-11-19

## 2023-11-19 RX ORDER — MAGNESIUM SULFATE 500 MG/ML
1 VIAL (ML) INJECTION ONCE
Refills: 0 | Status: COMPLETED | OUTPATIENT
Start: 2023-11-19 | End: 2023-11-19

## 2023-11-19 RX ADMIN — APIXABAN 5 MILLIGRAM(S): 2.5 TABLET, FILM COATED ORAL at 17:27

## 2023-11-19 RX ADMIN — Medication 100 GRAM(S): at 09:44

## 2023-11-19 RX ADMIN — LIDOCAINE 1 PATCH: 4 CREAM TOPICAL at 17:35

## 2023-11-19 RX ADMIN — Medication 80 MILLIGRAM(S): at 17:27

## 2023-11-19 RX ADMIN — CLOPIDOGREL BISULFATE 75 MILLIGRAM(S): 75 TABLET, FILM COATED ORAL at 11:11

## 2023-11-19 RX ADMIN — APIXABAN 5 MILLIGRAM(S): 2.5 TABLET, FILM COATED ORAL at 06:03

## 2023-11-19 RX ADMIN — CHLORHEXIDINE GLUCONATE 1 APPLICATION(S): 213 SOLUTION TOPICAL at 05:53

## 2023-11-19 RX ADMIN — PANTOPRAZOLE SODIUM 40 MILLIGRAM(S): 20 TABLET, DELAYED RELEASE ORAL at 06:03

## 2023-11-19 RX ADMIN — Medication 25 MILLIGRAM(S): at 06:03

## 2023-11-19 RX ADMIN — Medication 25 MILLIGRAM(S): at 17:28

## 2023-11-19 RX ADMIN — Medication 100 MILLIGRAM(S): at 11:11

## 2023-11-19 RX ADMIN — ATORVASTATIN CALCIUM 80 MILLIGRAM(S): 80 TABLET, FILM COATED ORAL at 21:03

## 2023-11-19 RX ADMIN — Medication 3: at 11:37

## 2023-11-19 RX ADMIN — LIDOCAINE 1 PATCH: 4 CREAM TOPICAL at 19:00

## 2023-11-19 RX ADMIN — Medication 80 MILLIGRAM(S): at 06:03

## 2023-11-19 RX ADMIN — Medication 1: at 17:28

## 2023-11-19 NOTE — PROGRESS NOTE ADULT - SUBJECTIVE AND OBJECTIVE BOX
DATE OF SERVICE: 11-19-23 @ 11:10    Patient is a 78y old  Male who presents with a chief complaint of hypotension (18 Nov 2023 14:37)      INTERVAL HISTORY: no complaints     REVIEW OF SYSTEMS:  CONSTITUTIONAL: No weakness  EYES/ENT: No visual changes;  No throat pain   NECK: No pain or stiffness  RESPIRATORY: No cough, wheezing; No shortness of breath  CARDIOVASCULAR: No chest pain or palpitations  GASTROINTESTINAL: No abdominal  pain. No nausea, vomiting, or hematemesis  GENITOURINARY: No dysuria, frequency or hematuria  NEUROLOGICAL: No stroke like symptoms  SKIN: No rashes    	  MEDICATIONS:  metoprolol succinate ER 25 milliGRAM(s) Oral daily  sotalol. 80 milliGRAM(s) Oral every 12 hours        PHYSICAL EXAM:  T(C): 36.5 (11-19-23 @ 04:27), Max: 36.9 (11-18-23 @ 11:36)  HR: 78 (11-19-23 @ 04:27) (73 - 78)  BP: 110/62 (11-19-23 @ 04:27) (101/58 - 110/62)  RR: 17 (11-19-23 @ 04:27) (17 - 18)  SpO2: 95% (11-19-23 @ 04:27) (95% - 96%)  Wt(kg): --  I&O's Summary    18 Nov 2023 07:01  -  19 Nov 2023 07:00  --------------------------------------------------------  IN: 480 mL / OUT: 0 mL / NET: 480 mL    19 Nov 2023 07:01  -  19 Nov 2023 11:10  --------------------------------------------------------  IN: 300 mL / OUT: 250 mL / NET: 50 mL          Appearance: In no distress	  HEENT:    PERRL, EOMI	  Cardiovascular:  S1 S2, No JVD  Respiratory: Lungs clear to auscultation	  Gastrointestinal:  Soft, Non-tender, + BS	  Vascularature:  No edema of LE  Psychiatric: Appropriate affect   Neuro: no acute focal deficits                               10.0   7.68  )-----------( 202      ( 18 Nov 2023 07:24 )             30.5     11-19    141  |  105  |  22  ----------------------------<  117<H>  4.1   |  26  |  0.92    Ca    9.0      19 Nov 2023 06:52  Mg     1.8     11-19    TPro  6.0  /  Alb  2.9<L>  /  TBili  0.5  /  DBili  x   /  AST  38  /  ALT  34  /  AlkPhos  160<H>  11-18        Labs personally reviewed      ASSESSMENT/PLAN: 	    78-year-old male multiple medical problems history of hepatocellular carcinoma status post radiation therapy, AF s/p DCCV on Eliquis who was found to be hypotensive following NST. Patient has reported general weakness, fatigue, lightheadedness since being discharged from hospital. Reports mild chest discomfort in midsternal region. Reports dyspnea with minimal exertion. Also reports significant lack of appetite and poor PO intake. No dark or bloody stool, nausea or vomiting.       Problem/Plan - 1:  ·  Problem: Hypotension  - Occurred s/p NST (no exercise component conducted)--> NST normal study as per Dr. Hameed  - Trop 91 --> lower than previous admission   - BUN/Cr elevated  - BP now recovering with fluid resuscitation. Patient reports significantly poor appetite and decreased PO intake since hospital discharge.  - Hypotension likely 2/2 hypovolemia.  - Follow up urine and blood cultures.  - rec adrenal insuffiencey work up      Problem/Plan - 2:  ·  Problem: Chest Pain  ·  Plan: Reported as mild intensity, midsternal, non-exertional and self resolves on its own.  - Trop 91 (similar to prior admission)  - Hx of recent PCI  - TTE from 10/25 shows preserved EF and no WMA  - NST 11/16 wnl  - CP unlikely cardiac in etiology.   - Chest pain now resolved.      Problem/Plan - 3:  ·  Problem: Shortness of Breath  ·  Plan: ECG non-ischemic  - Prior TTE wnl.   - CXR shows clear lungs  - Patient was not discharged on his usual Torsemide following previous hospital admission d/t RAZIA.   - BNP 2442 (lower than previous DC)  - Maintain off Torsemide and consider lower dose as OP if need be     Problem/Plan - 4:  ·  Problem: CAD.   ·  Plan: Recent PCI to pLAD in June 2023  - ECG non-ischemic  - Trop as noted above  - c/w Plavix and statin     Problem/Plan - 5:  ·  Problem: ARZIA   ·  Plan: RAZIA possibly pre-renal d/t poor intake.   - Cont to hold spironolactone also i/s/o hyperkalemia  - Was restarted on Torsemide 40mg PO BID as OP -- discontinue  - s/p IVF     Problem/Plan - 6:  ·  Problem: Atrial Fibrillation  - s/p succesful DCCV 10/31  - Cont Eliquis 5mg BID  - Cont Toprol 25mg PO daily  - C/w of Sotalol 80mg PO BID   - 11/19 25 beats WCT overnight. Given repeat episodes, increase Toprol to 50mg PO daily. CTM on tele              MARY Feliciano DO Located within [facility]  Cardiovascular Medicine  [address] [place] [address], [address]  Office: [phone]  Available via call/text on [employer] Teams  DATE OF SERVICE: 11-19-23 @ 11:10    Patient is a 78y old  Male who presents with a chief complaint of hypotension (18 Nov 2023 14:37)      INTERVAL HISTORY: no complaints     REVIEW OF SYSTEMS:  CONSTITUTIONAL: No weakness  EYES/ENT: No visual changes;  No throat pain   NECK: No pain or stiffness  RESPIRATORY: No cough, wheezing; No shortness of breath  CARDIOVASCULAR: No chest pain or palpitations  GASTROINTESTINAL: No abdominal  pain. No nausea, vomiting, or hematemesis  GENITOURINARY: No dysuria, frequency or hematuria  NEUROLOGICAL: No stroke like symptoms  SKIN: No rashes    	  MEDICATIONS:  metoprolol succinate ER 25 milliGRAM(s) Oral daily  sotalol. 80 milliGRAM(s) Oral every 12 hours        PHYSICAL EXAM:  T(C): 36.5 (11-19-23 @ 04:27), Max: 36.9 (11-18-23 @ 11:36)  HR: 78 (11-19-23 @ 04:27) (73 - 78)  BP: 110/62 (11-19-23 @ 04:27) (101/58 - 110/62)  RR: 17 (11-19-23 @ 04:27) (17 - 18)  SpO2: 95% (11-19-23 @ 04:27) (95% - 96%)  Wt(kg): --  I&O's Summary    18 Nov 2023 07:01  -  19 Nov 2023 07:00  --------------------------------------------------------  IN: 480 mL / OUT: 0 mL / NET: 480 mL    19 Nov 2023 07:01  -  19 Nov 2023 11:10  --------------------------------------------------------  IN: 300 mL / OUT: 250 mL / NET: 50 mL          Appearance: In no distress	  HEENT:    PERRL, EOMI	  Cardiovascular:  S1 S2, No JVD  Respiratory: Lungs clear to auscultation	  Gastrointestinal:  Soft, Non-tender, + BS	  Vascularature:  No edema of LE  Psychiatric: Appropriate affect   Neuro: no acute focal deficits                               10.0   7.68  )-----------( 202      ( 18 Nov 2023 07:24 )             30.5     11-19    141  |  105  |  22  ----------------------------<  117<H>  4.1   |  26  |  0.92    Ca    9.0      19 Nov 2023 06:52  Mg     1.8     11-19    TPro  6.0  /  Alb  2.9<L>  /  TBili  0.5  /  DBili  x   /  AST  38  /  ALT  34  /  AlkPhos  160<H>  11-18        Labs personally reviewed      ASSESSMENT/PLAN: 	    78-year-old male multiple medical problems history of hepatocellular carcinoma status post radiation therapy, AF s/p DCCV on Eliquis who was found to be hypotensive following NST. Patient has reported general weakness, fatigue, lightheadedness since being discharged from hospital. Reports mild chest discomfort in midsternal region. Reports dyspnea with minimal exertion. Also reports significant lack of appetite and poor PO intake. No dark or bloody stool, nausea or vomiting.       Problem/Plan - 1:  ·  Problem: Hypotension  - Occurred s/p NST (no exercise component conducted)--> NST normal study as per Dr. Hameed  - Trop 91 --> lower than previous admission   - BUN/Cr elevated  - BP now recovering with fluid resuscitation. Patient reports significantly poor appetite and decreased PO intake since hospital discharge.  - Hypotension likely 2/2 hypovolemia.  - Follow up urine and blood cultures.  - rec adrenal insuffiencey work up      Problem/Plan - 2:  ·  Problem: Chest Pain  ·  Plan: Reported as mild intensity, midsternal, non-exertional and self resolves on its own.  - Trop 91 (similar to prior admission)  - Hx of recent PCI  - TTE from 10/25 shows preserved EF and no WMA  - NST 11/16 wnl  - CP unlikely cardiac in etiology.   - Chest pain now resolved.      Problem/Plan - 3:  ·  Problem: Shortness of Breath  ·  Plan: ECG non-ischemic  - Prior TTE wnl.   - CXR shows clear lungs  - Patient was not discharged on his usual Torsemide following previous hospital admission d/t RAZIA.   - BNP 2442 (lower than previous DC)  - Maintain off Torsemide and consider lower dose as OP if need be     Problem/Plan - 4:  ·  Problem: CAD.   ·  Plan: Recent PCI to pLAD in June 2023  - ECG non-ischemic  - Trop as noted above  - c/w Plavix and statin     Problem/Plan - 5:  ·  Problem: RAZIA   ·  Plan: RAZIA possibly pre-renal d/t poor intake.   - Cont to hold spironolactone also i/s/o hyperkalemia  - Was restarted on Torsemide 40mg PO BID as OP -- discontinue  - s/p IVF     Problem/Plan - 6:  ·  Problem: Atrial Fibrillation  - s/p succesful DCCV 10/31  - Cont Eliquis 5mg BID  - C/w of Sotalol 80mg PO BID   - 11/19 25 beats WCT overnight. Given repeat episodes, increase Toprol to 50mg PO daily. CTM on tele   - EP consult             MARY Feliciano DO [facility]  Cardiovascular Medicine  [address] [place] [address], [address]  Office: [phone]  Available via call/text on [employer] Teams

## 2023-11-19 NOTE — PROGRESS NOTE ADULT - SUBJECTIVE AND OBJECTIVE BOX
PROGRESS NOTE:     Patient is a 78y old  Male who presents with a chief complaint of hypotension (19 Nov 2023 13:16)      SUBJECTIVE / OVERNIGHT EVENTS: Overnight telemetry noted 24 beats wide complex tachycardia.    Patient seen and evaluated at bedside. Reports L ankle pain, difficulty with weight bearing and ambulation. Otherwise denies chest pain/SOB/abdominal pain.     ADDITIONAL REVIEW OF SYSTEMS:    MEDICATIONS  (STANDING):  allopurinol 100 milliGRAM(s) Oral daily  apixaban 5 milliGRAM(s) Oral two times a day  atorvastatin 80 milliGRAM(s) Oral at bedtime  chlorhexidine 2% Cloths 1 Application(s) Topical <User Schedule>  clopidogrel Tablet 75 milliGRAM(s) Oral daily  dextrose 5%. 1000 milliLiter(s) (50 mL/Hr) IV Continuous <Continuous>  dextrose 5%. 1000 milliLiter(s) (100 mL/Hr) IV Continuous <Continuous>  dextrose 50% Injectable 25 Gram(s) IV Push once  dextrose 50% Injectable 12.5 Gram(s) IV Push once  dextrose 50% Injectable 25 Gram(s) IV Push once  glucagon  Injectable 1 milliGRAM(s) IntraMuscular once  insulin lispro (ADMELOG) corrective regimen sliding scale   SubCutaneous Before meals and at bedtime  metoprolol succinate ER 50 milliGRAM(s) Oral daily  metoprolol succinate ER 25 milliGRAM(s) Oral once  pantoprazole    Tablet 40 milliGRAM(s) Oral before breakfast  sotalol. 80 milliGRAM(s) Oral every 12 hours    MEDICATIONS  (PRN):  acetaminophen     Tablet .. 650 milliGRAM(s) Oral every 6 hours PRN Temp greater or equal to 38C (100.4F), Mild Pain (1 - 3)  aluminum hydroxide/magnesium hydroxide/simethicone Suspension 30 milliLiter(s) Oral every 4 hours PRN Dyspepsia  dextrose Oral Gel 15 Gram(s) Oral once PRN Blood Glucose LESS THAN 70 milliGRAM(s)/deciliter  melatonin 3 milliGRAM(s) Oral at bedtime PRN Insomnia  ondansetron Injectable 4 milliGRAM(s) IV Push every 8 hours PRN Nausea and/or Vomiting      CAPILLARY BLOOD GLUCOSE      POCT Blood Glucose.: 279 mg/dL (19 Nov 2023 11:32)  POCT Blood Glucose.: 116 mg/dL (19 Nov 2023 07:28)  POCT Blood Glucose.: 144 mg/dL (18 Nov 2023 21:29)  POCT Blood Glucose.: 104 mg/dL (18 Nov 2023 17:15)    I&O's Summary    18 Nov 2023 07:01  -  19 Nov 2023 07:00  --------------------------------------------------------  IN: 480 mL / OUT: 0 mL / NET: 480 mL    19 Nov 2023 07:01  -  19 Nov 2023 13:33  --------------------------------------------------------  IN: 600 mL / OUT: 450 mL / NET: 150 mL        PHYSICAL EXAM:  Vital Signs Last 24 Hrs  T(C): 36.8 (19 Nov 2023 11:56), Max: 36.9 (18 Nov 2023 16:52)  T(F): 98.2 (19 Nov 2023 11:56), Max: 98.4 (18 Nov 2023 16:52)  HR: 77 (19 Nov 2023 11:56) (74 - 78)  BP: 107/66 (19 Nov 2023 11:56) (101/58 - 110/62)  BP(mean): --  RR: 18 (19 Nov 2023 11:56) (17 - 18)  SpO2: 96% (19 Nov 2023 11:56) (95% - 96%)    Parameters below as of 19 Nov 2023 11:56  Patient On (Oxygen Delivery Method): room air        CONSTITUTIONAL: NAD  RESPIRATORY: Normal respiratory effort; lungs are clear to auscultation bilaterally  CARDIOVASCULAR: Regular rate and rhythm, normal S1 and S2, no murmur; No lower extremity edema;   ABDOMEN: Nontender to palpation, normoactive bowel sounds, no rebound/guarding  MUSCLOSKELETAL: L ankle tender on palpation, + mild swelling, no erythema, no warmth, no open wound, pain limited active range of motion   PSYCH: A+O to person, place, and time; affect appropriate    LABS:                        10.0   7.68  )-----------( 202      ( 18 Nov 2023 07:24 )             30.5     11-19    141  |  105  |  22  ----------------------------<  117<H>  4.1   |  26  |  0.92    Ca    9.0      19 Nov 2023 06:52  Mg     1.8     11-19    TPro  6.0  /  Alb  2.9<L>  /  TBili  0.5  /  DBili  x   /  AST  38  /  ALT  34  /  AlkPhos  160<H>  11-18          Urinalysis Basic - ( 19 Nov 2023 06:52 )    Color: x / Appearance: x / SG: x / pH: x  Gluc: 117 mg/dL / Ketone: x  / Bili: x / Urobili: x   Blood: x / Protein: x / Nitrite: x   Leuk Esterase: x / RBC: x / WBC x   Sq Epi: x / Non Sq Epi: x / Bacteria: x        Culture - Urine (collected 16 Nov 2023 16:26)  Source: Clean Catch Clean Catch (Midstream)  Final Report (18 Nov 2023 12:20):    <10,000 CFU/mL Normal Urogenital [place]        RADIOLOGY & ADDITIONAL TESTS:  Results Reviewed:   Imaging Personally Reviewed:  Electrocardiogram Personally Reviewed:    COORDINATION OF CARE:  Care Discussed with Consultants/Other Providers [Y/N]:  Prior or Outpatient Records Reviewed [Y/N]:

## 2023-11-19 NOTE — CONSULT NOTE ADULT - SUBJECTIVE AND OBJECTIVE BOX
Eliu Matamoros MD  Cardiology Fellow  [phone]  All Cardiology service information can be found 24/7 on [email or web address], password: [email or web address]    Patient seen and evaluated at bedside    HPI:  78m hx HCC s/p recent ?embolization vs. radiotherapy, HTN, LE, aflutter s/p DCCV on eliquis, LE edema on diuretics presenting with fatigue, noted to be hypotensive during an outpatient stress test. Of note, patient was just recently initiated on sotalol 10/30 for afib after TRACY/DCCV. While inpatient, patient has recieved fluid boluses with improvement in symptoms and blood pressure. However, noted to have 4 episodes of NSVT on telemetry, the longest being 25 beats. Patient previously taking sotalol 80 BID and toprol 25mg daily. Toprol was increased to 50mg daily due to the WCTs. No chest pain, NST was normal on 11/16. TTE with preserved EF.     PMHx:   Hypertension    Hypercholesterolemia    PAD (peripheral artery disease)    Neuroendocrine carcinoma        PSHx:   S/P knee replacement, bilateral    S/P hip replacement    History of hernia repair    H/O laminectomy    History of lumbosacral spine surgery        Allergies:  No Known Allergies      Current Medications:   acetaminophen     Tablet .. 650 milliGRAM(s) Oral every 6 hours PRN  allopurinol 100 milliGRAM(s) Oral daily  aluminum hydroxide/magnesium hydroxide/simethicone Suspension 30 milliLiter(s) Oral every 4 hours PRN  apixaban 5 milliGRAM(s) Oral two times a day  atorvastatin 80 milliGRAM(s) Oral at bedtime  chlorhexidine 2% Cloths 1 Application(s) Topical <User Schedule>  clopidogrel Tablet 75 milliGRAM(s) Oral daily  dextrose 5%. 1000 milliLiter(s) IV Continuous <Continuous>  dextrose 5%. 1000 milliLiter(s) IV Continuous <Continuous>  dextrose 50% Injectable 25 Gram(s) IV Push once  dextrose 50% Injectable 12.5 Gram(s) IV Push once  dextrose 50% Injectable 25 Gram(s) IV Push once  dextrose Oral Gel 15 Gram(s) Oral once PRN  glucagon  Injectable 1 milliGRAM(s) IntraMuscular once  insulin lispro (ADMELOG) corrective regimen sliding scale   SubCutaneous Before meals and at bedtime  lidocaine   4% Patch 1 Patch Transdermal daily  melatonin 3 milliGRAM(s) Oral at bedtime PRN  metoprolol succinate ER 50 milliGRAM(s) Oral daily  metoprolol succinate ER 25 milliGRAM(s) Oral once  ondansetron Injectable 4 milliGRAM(s) IV Push every 8 hours PRN  pantoprazole    Tablet 40 milliGRAM(s) Oral before breakfast  sotalol. 80 milliGRAM(s) Oral every 12 hours        REVIEW OF SYSTEMS:  CONSTITUTIONAL: No weakness, fevers or chills  EYES/ENT: No visual changes;  No dysphagia  NECK: No pain or stiffness  RESPIRATORY: No cough, wheezing, hemoptysis; No shortness of breath  CARDIOVASCULAR: No chest pain or palpitations; No lower extremity edema  GASTROINTESTINAL: No abdominal or epigastric pain. No nausea, vomiting, or hematemesis; No diarrhea or constipation. No melena or hematochezia.  BACK: No back pain  GENITOURINARY: No dysuria, frequency or hematuria  NEUROLOGICAL: No numbness or weakness  SKIN: No itching, burning, rashes, or lesions   All other review of systems is negative unless indicated above.    Physical Exam:  T(F): 98.2 (11-19), Max: 98.4 (11-18)  HR: 77 (11-19) (74 - 78)  BP: 107/66 (11-19) (101/58 - 110/62)  RR: 18 (11-19)  SpO2: 96% (11-19)  GEN: NAD  HEENT: EOMI, clear sclera  PULM: CTA b/l, no wheeze  CV: RRR S1 S2, no murmur, no JVD  ABD: S, NT, ND  EXT: WWP, trace edema  PSYCH: normal affect  SKIN: No rash    CXR: Personally reviewed    Labs: Personally reviewed                        10.0   7.68  )-----------( 202      ( 18 Nov 2023 07:24 )             30.5     11-19    141  |  105  |  22  ----------------------------<  117<H>  4.1   |  26  |  0.92    Ca    9.0      19 Nov 2023 06:52  Mg     1.8     11-19    TPro  6.0  /  Alb  2.9<L>  /  TBili  0.5  /  DBili  x   /  AST  38  /  ALT  34  /  AlkPhos  160<H>  11-18        CARDIAC MARKERS ( 16 Nov 2023 15:53 )  101 ng/L / x     / x     / x     / x     / x      CARDIAC MARKERS ( 16 Nov 2023 14:16 )  91 ng/L / x     / x     / x     / x     / x

## 2023-11-19 NOTE — PROGRESS NOTE ADULT - ASSESSMENT
NANDO HERNANDEZ is a 78y Male who presents with a chief complaint of hypotension.    Metastatic Pancreatic Neuroendocrine Tumor  ·	Patient follows with Dr. Sophia Prasad, [facility].  ·	Recent progression of disease; last received Lutetium [id number] LUTATHERA on October 20th.  ·	No systemic therapy while inpatient or during rehabilitation  ·	ongoing care after discharge    Anemia  ·	at baseline  ·	In the setting of inflammation, active malignancy  ·	Monitor and maintain HGB > 7    Hypotension  ·	cardiology consulted  ·	BP now recovering with fluid resuscitation. Patient reports significantly poor appetite and decreased PO intake since hospital discharge.  ·	Hypotension likely 2/2 hypovolemia.  ·	blood and urine cultures negative     Chest Pain  ·	now resolved  ·	Reported as mild intensity, midsternal, non-exertional and self resolves on its own.  ·	TTE from 10/25 shows preserved EF and no WMA    will follow,    Christen Rosales NP  Hematology/ Oncology  [facility]  [phone] (office)  [phone] (alt office)  Evenings and weekends please call MD on call or office

## 2023-11-19 NOTE — PROGRESS NOTE ADULT - SUBJECTIVE AND OBJECTIVE BOX
Patient is a 78y old  Male who presents with a chief complaint of hypotension (19 Nov 2023 11:10)    Patient seen and examined. Appeared comfortable in bed, no complaints reported.    MEDICATIONS  (STANDING):  allopurinol 100 milliGRAM(s) Oral daily  apixaban 5 milliGRAM(s) Oral two times a day  atorvastatin 80 milliGRAM(s) Oral at bedtime  chlorhexidine 2% Cloths 1 Application(s) Topical <User Schedule>  clopidogrel Tablet 75 milliGRAM(s) Oral daily  dextrose 5%. 1000 milliLiter(s) (50 mL/Hr) IV Continuous <Continuous>  dextrose 5%. 1000 milliLiter(s) (100 mL/Hr) IV Continuous <Continuous>  dextrose 50% Injectable 25 Gram(s) IV Push once  dextrose 50% Injectable 12.5 Gram(s) IV Push once  dextrose 50% Injectable 25 Gram(s) IV Push once  glucagon  Injectable 1 milliGRAM(s) IntraMuscular once  insulin lispro (ADMELOG) corrective regimen sliding scale   SubCutaneous Before meals and at bedtime  metoprolol succinate ER 50 milliGRAM(s) Oral daily  metoprolol succinate ER 25 milliGRAM(s) Oral once  pantoprazole    Tablet 40 milliGRAM(s) Oral before breakfast  sotalol. 80 milliGRAM(s) Oral every 12 hours    MEDICATIONS  (PRN):  acetaminophen     Tablet .. 650 milliGRAM(s) Oral every 6 hours PRN Temp greater or equal to 38C (100.4F), Mild Pain (1 - 3)  aluminum hydroxide/magnesium hydroxide/simethicone Suspension 30 milliLiter(s) Oral every 4 hours PRN Dyspepsia  dextrose Oral Gel 15 Gram(s) Oral once PRN Blood Glucose LESS THAN 70 milliGRAM(s)/deciliter  melatonin 3 milliGRAM(s) Oral at bedtime PRN Insomnia  ondansetron Injectable 4 milliGRAM(s) IV Push every 8 hours PRN Nausea and/or Vomiting      Vital Signs Last 24 Hrs  T(C): 36.8 (19 Nov 2023 11:56), Max: 36.9 (18 Nov 2023 16:52)  T(F): 98.2 (19 Nov 2023 11:56), Max: 98.4 (18 Nov 2023 16:52)  HR: 77 (19 Nov 2023 11:56) (74 - 78)  BP: 107/66 (19 Nov 2023 11:56) (101/58 - 110/62)  BP(mean): --  RR: 18 (19 Nov 2023 11:56) (17 - 18)  SpO2: 96% (19 Nov 2023 11:56) (95% - 96%)    Parameters below as of 19 Nov 2023 11:56  Patient On (Oxygen Delivery Method): room air        PE  Awake, alert  Anicteric, MMM  RRR  CTAB  Abd soft, NT, ND  No c/c/e                            10.0   7.68  )-----------( 202      ( 18 Nov 2023 07:24 )             30.5       11-19    141  |  105  |  22  ----------------------------<  117<H>  4.1   |  26  |  0.92    Ca    9.0      19 Nov 2023 06:52  Mg     1.8     11-19    TPro  6.0  /  Alb  2.9<L>  /  TBili  0.5  /  DBili  x   /  AST  38  /  ALT  34  /  AlkPhos  160<H>  11-18

## 2023-11-19 NOTE — CONSULT NOTE ADULT - ASSESSMENT
78m hx HCC s/p recent ?embolization vs. radiotherapy, HTN, LE, aflutter s/p DCCV on eliquis, LE edema on diuretics presenting with hypovolemia which is now resolving. EP consulted for WCTs on tele, which are asymptomatic. Morphology appears to be NSVT, rather than rate related aberrancy. QTc not prolonged, reassuringly (447 on 11/19).     Recs:  -c/w sotalol 80 BID, toprol 50 daily  -c/w eliquis  -monitor on telemetry for now

## 2023-11-20 DIAGNOSIS — N17.9 ACUTE KIDNEY FAILURE, UNSPECIFIED: ICD-10-CM

## 2023-11-20 LAB
BUN SERPL-MCNC: 22 MG/DL — SIGNIFICANT CHANGE UP (ref 7–23)
BUN SERPL-MCNC: 22 MG/DL — SIGNIFICANT CHANGE UP (ref 7–23)
CALCIUM SERPL-MCNC: 9.1 MG/DL — SIGNIFICANT CHANGE UP (ref 8.4–10.5)
CALCIUM SERPL-MCNC: 9.1 MG/DL — SIGNIFICANT CHANGE UP (ref 8.4–10.5)
CHLORIDE SERPL-SCNC: 104 MMOL/L — SIGNIFICANT CHANGE UP (ref 96–108)
CHLORIDE SERPL-SCNC: 104 MMOL/L — SIGNIFICANT CHANGE UP (ref 96–108)
CO2 SERPL-SCNC: 24 MMOL/L — SIGNIFICANT CHANGE UP (ref 22–31)
CO2 SERPL-SCNC: 24 MMOL/L — SIGNIFICANT CHANGE UP (ref 22–31)
CREAT SERPL-MCNC: 1.17 MG/DL — SIGNIFICANT CHANGE UP (ref 0.5–1.3)
CREAT SERPL-MCNC: 1.17 MG/DL — SIGNIFICANT CHANGE UP (ref 0.5–1.3)
CRP SERPL-MCNC: 107 MG/L — HIGH (ref 0–4)
CRP SERPL-MCNC: 107 MG/L — HIGH (ref 0–4)
EGFR: 64 ML/MIN/1.73M2 — SIGNIFICANT CHANGE UP
EGFR: 64 ML/MIN/1.73M2 — SIGNIFICANT CHANGE UP
GLUCOSE BLDC GLUCOMTR-MCNC: 116 MG/DL — HIGH (ref 70–99)
GLUCOSE BLDC GLUCOMTR-MCNC: 116 MG/DL — HIGH (ref 70–99)
GLUCOSE BLDC GLUCOMTR-MCNC: 120 MG/DL — HIGH (ref 70–99)
GLUCOSE BLDC GLUCOMTR-MCNC: 120 MG/DL — HIGH (ref 70–99)
GLUCOSE BLDC GLUCOMTR-MCNC: 127 MG/DL — HIGH (ref 70–99)
GLUCOSE BLDC GLUCOMTR-MCNC: 127 MG/DL — HIGH (ref 70–99)
GLUCOSE BLDC GLUCOMTR-MCNC: 131 MG/DL — HIGH (ref 70–99)
GLUCOSE BLDC GLUCOMTR-MCNC: 131 MG/DL — HIGH (ref 70–99)
GLUCOSE SERPL-MCNC: 116 MG/DL — HIGH (ref 70–99)
GLUCOSE SERPL-MCNC: 116 MG/DL — HIGH (ref 70–99)
HCT VFR BLD CALC: 31.5 % — LOW (ref 39–50)
HCT VFR BLD CALC: 31.5 % — LOW (ref 39–50)
HGB BLD-MCNC: 10.1 G/DL — LOW (ref 13–17)
HGB BLD-MCNC: 10.1 G/DL — LOW (ref 13–17)
MAGNESIUM SERPL-MCNC: 1.8 MG/DL — SIGNIFICANT CHANGE UP (ref 1.6–2.6)
MAGNESIUM SERPL-MCNC: 1.8 MG/DL — SIGNIFICANT CHANGE UP (ref 1.6–2.6)
MCHC RBC-ENTMCNC: 30 PG — SIGNIFICANT CHANGE UP (ref 27–34)
MCHC RBC-ENTMCNC: 30 PG — SIGNIFICANT CHANGE UP (ref 27–34)
MCHC RBC-ENTMCNC: 32.1 GM/DL — SIGNIFICANT CHANGE UP (ref 32–36)
MCHC RBC-ENTMCNC: 32.1 GM/DL — SIGNIFICANT CHANGE UP (ref 32–36)
MCV RBC AUTO: 93.5 FL — SIGNIFICANT CHANGE UP (ref 80–100)
MCV RBC AUTO: 93.5 FL — SIGNIFICANT CHANGE UP (ref 80–100)
NRBC # BLD: 0 /100 WBCS — SIGNIFICANT CHANGE UP (ref 0–0)
NRBC # BLD: 0 /100 WBCS — SIGNIFICANT CHANGE UP (ref 0–0)
PHOSPHATE SERPL-MCNC: 2.6 MG/DL — SIGNIFICANT CHANGE UP (ref 2.5–4.5)
PHOSPHATE SERPL-MCNC: 2.6 MG/DL — SIGNIFICANT CHANGE UP (ref 2.5–4.5)
PLATELET # BLD AUTO: 216 K/UL — SIGNIFICANT CHANGE UP (ref 150–400)
PLATELET # BLD AUTO: 216 K/UL — SIGNIFICANT CHANGE UP (ref 150–400)
POTASSIUM SERPL-MCNC: 4.5 MMOL/L — SIGNIFICANT CHANGE UP (ref 3.5–5.3)
POTASSIUM SERPL-MCNC: 4.5 MMOL/L — SIGNIFICANT CHANGE UP (ref 3.5–5.3)
POTASSIUM SERPL-SCNC: 4.5 MMOL/L — SIGNIFICANT CHANGE UP (ref 3.5–5.3)
POTASSIUM SERPL-SCNC: 4.5 MMOL/L — SIGNIFICANT CHANGE UP (ref 3.5–5.3)
RBC # BLD: 3.37 M/UL — LOW (ref 4.2–5.8)
RBC # BLD: 3.37 M/UL — LOW (ref 4.2–5.8)
RBC # FLD: 15.2 % — HIGH (ref 10.3–14.5)
RBC # FLD: 15.2 % — HIGH (ref 10.3–14.5)
SODIUM SERPL-SCNC: 139 MMOL/L — SIGNIFICANT CHANGE UP (ref 135–145)
SODIUM SERPL-SCNC: 139 MMOL/L — SIGNIFICANT CHANGE UP (ref 135–145)
URATE SERPL-MCNC: 5.2 MG/DL — SIGNIFICANT CHANGE UP (ref 3.4–8.8)
URATE SERPL-MCNC: 5.2 MG/DL — SIGNIFICANT CHANGE UP (ref 3.4–8.8)
WBC # BLD: 6.63 K/UL — SIGNIFICANT CHANGE UP (ref 3.8–10.5)
WBC # BLD: 6.63 K/UL — SIGNIFICANT CHANGE UP (ref 3.8–10.5)
WBC # FLD AUTO: 6.63 K/UL — SIGNIFICANT CHANGE UP (ref 3.8–10.5)
WBC # FLD AUTO: 6.63 K/UL — SIGNIFICANT CHANGE UP (ref 3.8–10.5)

## 2023-11-20 PROCEDURE — 93010 ELECTROCARDIOGRAM REPORT: CPT

## 2023-11-20 PROCEDURE — 99233 SBSQ HOSP IP/OBS HIGH 50: CPT

## 2023-11-20 RX ORDER — COLCHICINE 0.6 MG
0.6 TABLET ORAL EVERY 24 HOURS
Refills: 0 | Status: DISCONTINUED | OUTPATIENT
Start: 2023-11-20 | End: 2023-11-22

## 2023-11-20 RX ORDER — COLCHICINE 0.6 MG
1.2 TABLET ORAL ONCE
Refills: 0 | Status: COMPLETED | OUTPATIENT
Start: 2023-11-20 | End: 2023-11-20

## 2023-11-20 RX ADMIN — PANTOPRAZOLE SODIUM 40 MILLIGRAM(S): 20 TABLET, DELAYED RELEASE ORAL at 05:02

## 2023-11-20 RX ADMIN — ATORVASTATIN CALCIUM 80 MILLIGRAM(S): 80 TABLET, FILM COATED ORAL at 21:49

## 2023-11-20 RX ADMIN — APIXABAN 5 MILLIGRAM(S): 2.5 TABLET, FILM COATED ORAL at 05:01

## 2023-11-20 RX ADMIN — Medication 100 MILLIGRAM(S): at 11:28

## 2023-11-20 RX ADMIN — Medication 0.6 MILLIGRAM(S): at 17:32

## 2023-11-20 RX ADMIN — CHLORHEXIDINE GLUCONATE 1 APPLICATION(S): 213 SOLUTION TOPICAL at 05:01

## 2023-11-20 RX ADMIN — Medication 80 MILLIGRAM(S): at 05:01

## 2023-11-20 RX ADMIN — Medication 50 MILLIGRAM(S): at 05:03

## 2023-11-20 RX ADMIN — Medication 1.2 MILLIGRAM(S): at 14:07

## 2023-11-20 RX ADMIN — CLOPIDOGREL BISULFATE 75 MILLIGRAM(S): 75 TABLET, FILM COATED ORAL at 11:29

## 2023-11-20 RX ADMIN — LIDOCAINE 1 PATCH: 4 CREAM TOPICAL at 04:55

## 2023-11-20 RX ADMIN — Medication 80 MILLIGRAM(S): at 17:32

## 2023-11-20 NOTE — PROGRESS NOTE ADULT - SUBJECTIVE AND OBJECTIVE BOX
24H hour events: SR with bursts of PAT overnight/this AM, brief NSVT. Longest run 24bts 11/19 AM     MEDICATIONS:  clopidogrel Tablet 75 milliGRAM(s) Oral daily  metoprolol succinate ER 50 milliGRAM(s) Oral daily  sotalol. 80 milliGRAM(s) Oral every 12 hours        acetaminophen     Tablet .. 650 milliGRAM(s) Oral every 6 hours PRN  melatonin 3 milliGRAM(s) Oral at bedtime PRN  ondansetron Injectable 4 milliGRAM(s) IV Push every 8 hours PRN    aluminum hydroxide/magnesium hydroxide/simethicone Suspension 30 milliLiter(s) Oral every 4 hours PRN  pantoprazole    Tablet 40 milliGRAM(s) Oral before breakfast    allopurinol 100 milliGRAM(s) Oral daily  atorvastatin 80 milliGRAM(s) Oral at bedtime  colchicine 0.6 milliGRAM(s) Oral every 24 hours  colchicine 1.2 milliGRAM(s) Oral once  dextrose 50% Injectable 25 Gram(s) IV Push once  dextrose 50% Injectable 12.5 Gram(s) IV Push once  dextrose 50% Injectable 25 Gram(s) IV Push once  dextrose Oral Gel 15 Gram(s) Oral once PRN  glucagon  Injectable 1 milliGRAM(s) IntraMuscular once  insulin lispro (ADMELOG) corrective regimen sliding scale   SubCutaneous Before meals and at bedtime    chlorhexidine 2% Cloths 1 Application(s) Topical <User Schedule>  dextrose 5%. 1000 milliLiter(s) IV Continuous <Continuous>  dextrose 5%. 1000 milliLiter(s) IV Continuous <Continuous>  lidocaine   4% Patch 1 Patch Transdermal daily      REVIEW OF SYSTEMS:  See HPI, otherwise ROS negative.    PHYSICAL EXAM:  T(C): 36.4 (11-20-23 @ 11:05), Max: 36.9 (11-19-23 @ 20:41)  HR: 78 (11-20-23 @ 11:05) (76 - 88)  BP: 115/66 (11-20-23 @ 11:05) (105/64 - 115/66)  RR: 18 (11-20-23 @ 11:05) (18 - 18)  SpO2: 98% (11-20-23 @ 11:05) (94% - 98%)  Wt(kg): --  I&O's Summary    19 Nov 2023 07:01  -  20 Nov 2023 07:00  --------------------------------------------------------  IN: 1040 mL / OUT: 850 mL / NET: 190 mL    20 Nov 2023 07:01  -  20 Nov 2023 13:48  --------------------------------------------------------  IN: 480 mL / OUT: 700 mL / NET: -220 mL        Appearance: Alert. NAD	  Cardiovascular: +S1S2 RRR no m/g/r  Respiratory: CTA B/L	  Psychiatry: A & O x 3, Mood & affect appropriate  Gastrointestinal:  Soft, NT. ND. +BS	  Skin: No rashes, masses or lesions  Neurologic: Non-focal  Extremities: No edema BLE  Vascular: Peripheral pulses palpable 2+ bilaterally      LABS:	 	    CBC Full  -  ( 20 Nov 2023 07:34 )  WBC Count : 6.63 K/uL  Hemoglobin : 10.1 g/dL  Hematocrit : 31.5 %  Platelet Count - Automated : 216 K/uL  Mean Cell Volume : 93.5 fl  Mean Cell Hemoglobin : 30.0 pg  Mean Cell Hemoglobin Concentration : 32.1 gm/dL  Auto Neutrophil # : x  Auto Lymphocyte # : x  Auto Monocyte # : x  Auto Eosinophil # : x  Auto Basophil # : x  Auto Neutrophil % : x  Auto Lymphocyte % : x  Auto Monocyte % : x  Auto Eosinophil % : x  Auto Basophil % : x    11-20    139  |  104  |  22  ----------------------------<  116<H>  4.5   |  24  |  1.17  11-19    141  |  105  |  22  ----------------------------<  117<H>  4.1   |  26  |  0.92    Ca    9.1      20 Nov 2023 07:33  Ca    9.0      19 Nov 2023 06:52  Phos  2.6     11-20  Mg     1.8     11-20  Mg     1.8     11-19      TSH: Thyroid Stimulating Hormone, Serum: 3.74 uIU/mL (10.31.23 @ 07:06)    TELEMETRY: SR with brief runs of pAT and NSVT up to 24 bts yesterday  	    ECG:  	    RADIOLOGY:    	  ASSESSMENT/PLAN: 	     24H hour events: SR with bursts of PAT overnight/this AM, brief NSVT. Longest run 24bts 11/19 AM     MEDICATIONS:  clopidogrel Tablet 75 milliGRAM(s) Oral daily  metoprolol succinate ER 50 milliGRAM(s) Oral daily  sotalol. 80 milliGRAM(s) Oral every 12 hours        acetaminophen     Tablet .. 650 milliGRAM(s) Oral every 6 hours PRN  melatonin 3 milliGRAM(s) Oral at bedtime PRN  ondansetron Injectable 4 milliGRAM(s) IV Push every 8 hours PRN    aluminum hydroxide/magnesium hydroxide/simethicone Suspension 30 milliLiter(s) Oral every 4 hours PRN  pantoprazole    Tablet 40 milliGRAM(s) Oral before breakfast    allopurinol 100 milliGRAM(s) Oral daily  atorvastatin 80 milliGRAM(s) Oral at bedtime  colchicine 0.6 milliGRAM(s) Oral every 24 hours  colchicine 1.2 milliGRAM(s) Oral once  dextrose 50% Injectable 25 Gram(s) IV Push once  dextrose 50% Injectable 12.5 Gram(s) IV Push once  dextrose 50% Injectable 25 Gram(s) IV Push once  dextrose Oral Gel 15 Gram(s) Oral once PRN  glucagon  Injectable 1 milliGRAM(s) IntraMuscular once  insulin lispro (ADMELOG) corrective regimen sliding scale   SubCutaneous Before meals and at bedtime    chlorhexidine 2% Cloths 1 Application(s) Topical <User Schedule>  dextrose 5%. 1000 milliLiter(s) IV Continuous <Continuous>  dextrose 5%. 1000 milliLiter(s) IV Continuous <Continuous>  lidocaine   4% Patch 1 Patch Transdermal daily      REVIEW OF SYSTEMS:  See HPI, otherwise ROS negative.    PHYSICAL EXAM:  T(C): 36.4 (11-20-23 @ 11:05), Max: 36.9 (11-19-23 @ 20:41)  HR: 78 (11-20-23 @ 11:05) (76 - 88)  BP: 115/66 (11-20-23 @ 11:05) (105/64 - 115/66)  RR: 18 (11-20-23 @ 11:05) (18 - 18)  SpO2: 98% (11-20-23 @ 11:05) (94% - 98%)  Wt(kg): --  I&O's Summary    19 Nov 2023 07:01  -  20 Nov 2023 07:00  --------------------------------------------------------  IN: 1040 mL / OUT: 850 mL / NET: 190 mL    20 Nov 2023 07:01  -  20 Nov 2023 13:48  --------------------------------------------------------  IN: 480 mL / OUT: 700 mL / NET: -220 mL        Appearance: Alert. NAD	  Cardiovascular: +S1S2 RRR no m/g/r  Respiratory: CTA B/L	  Psychiatry: A & O x 3, Mood & affect appropriate  Gastrointestinal:  Soft, NT. ND. +BS	  Skin: No rashes, masses or lesions  Neurologic: Non-focal  Extremities: No edema BLE  Vascular: Peripheral pulses palpable 2+ bilaterally      LABS:	 	    CBC Full  -  ( 20 Nov 2023 07:34 )  WBC Count : 6.63 K/uL  Hemoglobin : 10.1 g/dL  Hematocrit : 31.5 %  Platelet Count - Automated : 216 K/uL  Mean Cell Volume : 93.5 fl  Mean Cell Hemoglobin : 30.0 pg  Mean Cell Hemoglobin Concentration : 32.1 gm/dL  Auto Neutrophil # : x  Auto Lymphocyte # : x  Auto Monocyte # : x  Auto Eosinophil # : x  Auto Basophil # : x  Auto Neutrophil % : x  Auto Lymphocyte % : x  Auto Monocyte % : x  Auto Eosinophil % : x  Auto Basophil % : x    11-20    139  |  104  |  22  ----------------------------<  116<H>  4.5   |  24  |  1.17  11-19    141  |  105  |  22  ----------------------------<  117<H>  4.1   |  26  |  0.92    Ca    9.1      20 Nov 2023 07:33  Ca    9.0      19 Nov 2023 06:52  Phos  2.6     11-20  Mg     1.8     11-20  Mg     1.8     11-19      TSH: Thyroid Stimulating Hormone, Serum: 3.74 uIU/mL (10.31.23 @ 07:06)    TELEMETRY: SR with brief runs of pAT and NSVT up to 24 bts yesterday  	    ECG:  	SR 74bpm QTC 444ms (calculated by me) QRS 86ms CHERYL 174ms    TTE: < from: TTE W or WO Ultrasound Enhancing Agent (10.25.23 @ 08:30) >  _______________________________________________________________________________________     CONCLUSIONS:      1. Left ventricular cavity is normal.Left ventricular wall thickness is mildly increased. Left ventricular systolic function is normal with an ejection fraction of 64 % by Avila's method of disks. There are no regional wall motion abnormalities seen.   2. Normal left ventricular diastolic function, with normal filling pressure.   3. Normal right ventricular cavity size, wall thickness, and systolic function.   4. The left atrium is moderately dilated in size.   5. No significant valvular disease.   6. No pericardial effusion seen.   7. No prior echocardiogram is available for comparison.    ________________________________________________________________________________________  FINDINGS:     Left Ventricle:  The left ventricular cavity is normal. Left ventricular wall thickness is mildly increased. Left ventricular systolic function is normal with a calculated ejection fraction of 64 % by the Avila's biplane method of disks. There are no regional wall motion abnormalities seen. There is normal left ventricular diastolicfunction, with normal filling pressure. There is no evidence of a left ventricular thrombus.     Right Ventricle:  The right ventricular cavity is normal in size, normal wall thickness and normal systolic function.     Left Atrium:  The left atrium is moderately dilated in size with an indexed volume of 51.00 ml/m².     Right Atrium:  The right atrium is normal in size.     Interatrial Septum:  The interatrial septum appears intact.     Aortic Valve:  The aortic valve appears trileaflet. There isfibrocalcific aortic valve sclerosis without stenosis. There is no evidence of aortic regurgitation.     Mitral Valve:  Structurally normal mitral valve with normal leaflet excursion. There is no mitral valve stenosis. There is trace mitral regurgitation.     Tricuspid Valve:  Structurally normal tricuspid valve with normal leaflet excursion. There is trace tricuspid regurgitation.     Pulmonic Valve:  Structurally normal pulmonic valve with normal leaflet excursion. There is trace pulmonic regurgitation.     Aorta:  The aortic annulus and aortic root appear normal in size.     Pericardium:  No pericardial effusion seen.     Systemic Veins:  The inferior vena cava is normal in size (normal <2.1cm) with normal inspiratory collapse (normal >50%) consistent with normal right atrial pressure (~3, range 0-5mmHg).    < end of copied text >

## 2023-11-20 NOTE — PROGRESS NOTE ADULT - ASSESSMENT
NANDO HERNANDEZ is a 78y Male who presents with a chief complaint of hypotension.    Metastatic Pancreatic Neuroendocrine Tumor  ·	Patient follows with Dr. Sophia Prasad, [facility].  ·	Recent progression of disease; last received Lutetium [id number] LUTATHERA on October 20th.  ·	No systemic therapy while inpatient or during rehabilitation  ·	ongoing care after discharge    Anemia  ·	at baseline  ·	In the setting of inflammation, active malignancy  ·	Monitor and maintain HGB > 7    Hypotension  ·	cardiology consulted  ·	BP now recovering with fluid resuscitation. Patient reports significantly poor appetite and decreased PO intake since hospital discharge.  ·	Hypotension likely 2/2 hypovolemia.  ·	blood and urine cultures negative     Chest Pain  ·	now resolved  ·	Reported as mild intensity, midsternal, non-exertional and self resolves on its own.  ·	TTE from 10/25 shows preserved EF and no WMA  ·	EP consulted for WCTs  ·	plan for possible angio 11/22    will follow,    Christen Rosales NP  Hematology/ Oncology  [facility]  [phone] (office)  [phone] (alt office)  Evenings and weekends please call MD on call or office

## 2023-11-20 NOTE — PROGRESS NOTE ADULT - SUBJECTIVE AND OBJECTIVE BOX
Andre Reyes, M.D.  Pager: [phone]  Office: [phone]    Patient is a 78y old  Male who presents with a chief complaint of hypotension (19 Nov 2023 14:05)          SUBJECTIVE / OVERNIGHT EVENTS:    No acute overnight events.    ROS: ( - ) Fever, ( - )Chills,  ( - )Nausea/Vomiting, ( - ) Cough, ( - )Shortness of breath, ( - )Chest Pain    MEDICATIONS  (STANDING):  allopurinol 100 milliGRAM(s) Oral daily  atorvastatin 80 milliGRAM(s) Oral at bedtime  chlorhexidine 2% Cloths 1 Application(s) Topical <User Schedule>  clopidogrel Tablet 75 milliGRAM(s) Oral daily  dextrose 5%. 1000 milliLiter(s) (50 mL/Hr) IV Continuous <Continuous>  dextrose 5%. 1000 milliLiter(s) (100 mL/Hr) IV Continuous <Continuous>  dextrose 50% Injectable 12.5 Gram(s) IV Push once  dextrose 50% Injectable 25 Gram(s) IV Push once  dextrose 50% Injectable 25 Gram(s) IV Push once  glucagon  Injectable 1 milliGRAM(s) IntraMuscular once  insulin lispro (ADMELOG) corrective regimen sliding scale   SubCutaneous Before meals and at bedtime  lidocaine   4% Patch 1 Patch Transdermal daily  metoprolol succinate ER 50 milliGRAM(s) Oral daily  pantoprazole    Tablet 40 milliGRAM(s) Oral before breakfast  sotalol. 80 milliGRAM(s) Oral every 12 hours    MEDICATIONS  (PRN):  acetaminophen     Tablet .. 650 milliGRAM(s) Oral every 6 hours PRN Temp greater or equal to 38C (100.4F), Mild Pain (1 - 3)  aluminum hydroxide/magnesium hydroxide/simethicone Suspension 30 milliLiter(s) Oral every 4 hours PRN Dyspepsia  dextrose Oral Gel 15 Gram(s) Oral once PRN Blood Glucose LESS THAN 70 milliGRAM(s)/deciliter  melatonin 3 milliGRAM(s) Oral at bedtime PRN Insomnia  ondansetron Injectable 4 milliGRAM(s) IV Push every 8 hours PRN Nausea and/or Vomiting          T(C): 36.9 (11-20 @ 04:50), Max: 36.9 (11-19 @ 20:41)   HR: 76   BP: 106/65   RR: 18   SpO2: 94%    PHYSICAL EXAM:    CONSTITUTIONAL: NAD, well-developed, well-groomed  EYES: PERRLA; conjunctiva and sclera clear  ENMT: Moist oral mucosa, no pharyngeal injection or exudates; normal dentition  NECK: Supple, no palpable masses; no thyromegaly  RESPIRATORY: Normal respiratory effort; lungs are clear to auscultation bilaterally  CARDIOVASCULAR: Regular rate and rhythm, normal S1 and S2, no murmur/rub/gallop; No lower extremity edema; Peripheral pulses are 2+ bilaterally  ABDOMEN: Nontender to palpation, normoactive bowel sounds, no rebound/guarding; No hepatosplenomegaly  MUSCULOSKELETAL:  no clubbing or cyanosis of digits; no joint swelling or tenderness to palpation  PSYCH: A+O to person, place, and time; affect appropriate  NEUROLOGY: CN 2-12 are intact and symmetric; no gross sensory deficits   SKIN: No rashes; no palpable lesions      LABS:                        10.1   6.63  )-----------( 216      ( 20 Nov 2023 07:34 )             31.5      11-20    139  |  104  |  22  ----------------------------<  116<H>  4.5   |  24  |  1.17    Ca    9.1      20 Nov 2023 07:33  Phos  2.6     11-20  Mg     1.8     11-20         CAPILLARY BLOOD GLUCOSE      POCT Blood Glucose.: 116 mg/dL (20 Nov 2023 07:31)  POCT Blood Glucose.: 120 mg/dL (19 Nov 2023 21:07)  POCT Blood Glucose.: 200 mg/dL (19 Nov 2023 17:22)  POCT Blood Glucose.: 279 mg/dL (19 Nov 2023 11:32)      RADIOLOGY & ADDITIONAL TESTS:    Imaging Personally Reviewed:  Consultant(s) Notes Reviewed:    Care Discussed with Consultants/Other Providers:   Andre Reyes, M.D.  Pager: [phone]  Office: [phone]    Patient is a 78y old  Male who presents with a chief complaint of hypotension (19 Nov 2023 14:05)          SUBJECTIVE / OVERNIGHT EVENTS:    No acute overnight events.  having severe left ankle pain with movement.    ROS: ( - ) Fever, ( - )Chills,  ( - )Nausea/Vomiting, ( - ) Cough, ( - )Shortness of breath, ( - )Chest Pain    MEDICATIONS  (STANDING):  allopurinol 100 milliGRAM(s) Oral daily  atorvastatin 80 milliGRAM(s) Oral at bedtime  chlorhexidine 2% Cloths 1 Application(s) Topical <User Schedule>  clopidogrel Tablet 75 milliGRAM(s) Oral daily  dextrose 5%. 1000 milliLiter(s) (50 mL/Hr) IV Continuous <Continuous>  dextrose 5%. 1000 milliLiter(s) (100 mL/Hr) IV Continuous <Continuous>  dextrose 50% Injectable 12.5 Gram(s) IV Push once  dextrose 50% Injectable 25 Gram(s) IV Push once  dextrose 50% Injectable 25 Gram(s) IV Push once  glucagon  Injectable 1 milliGRAM(s) IntraMuscular once  insulin lispro (ADMELOG) corrective regimen sliding scale   SubCutaneous Before meals and at bedtime  lidocaine   4% Patch 1 Patch Transdermal daily  metoprolol succinate ER 50 milliGRAM(s) Oral daily  pantoprazole    Tablet 40 milliGRAM(s) Oral before breakfast  sotalol. 80 milliGRAM(s) Oral every 12 hours    MEDICATIONS  (PRN):  acetaminophen     Tablet .. 650 milliGRAM(s) Oral every 6 hours PRN Temp greater or equal to 38C (100.4F), Mild Pain (1 - 3)  aluminum hydroxide/magnesium hydroxide/simethicone Suspension 30 milliLiter(s) Oral every 4 hours PRN Dyspepsia  dextrose Oral Gel 15 Gram(s) Oral once PRN Blood Glucose LESS THAN 70 milliGRAM(s)/deciliter  melatonin 3 milliGRAM(s) Oral at bedtime PRN Insomnia  ondansetron Injectable 4 milliGRAM(s) IV Push every 8 hours PRN Nausea and/or Vomiting          T(C): 36.9 (11-20 @ 04:50), Max: 36.9 (11-19 @ 20:41)   HR: 76   BP: 106/65   RR: 18   SpO2: 94%    PHYSICAL EXAM:    CONSTITUTIONAL: NAD, well-developed, well-groomed  EYES: PERRLA; conjunctiva and sclera clear  ENMT: Moist oral mucosa, no pharyngeal injection or exudates; normal dentition  NECK: Supple, no palpable masses; no thyromegaly  RESPIRATORY: Normal respiratory effort; lungs are clear to auscultation bilaterally  CARDIOVASCULAR: Regular rate and rhythm, normal S1 and S2, no murmur/rub/gallop; No lower extremity edema; Peripheral pulses are 2+ bilaterally  ABDOMEN: Nontender to palpation, normoactive bowel sounds, no rebound/guarding; No hepatosplenomegaly  MUSCULOSKELETAL:  no clubbing or cyanosis of digits; no joint swelling; left ankle tenderness with movement, no ankle warmth  PSYCH: A+O to person, place, and time; affect appropriate  NEUROLOGY: CN 2-12 are intact and symmetric; no gross sensory deficits   SKIN: No rashes; no palpable lesions      LABS:                        10.1   6.63  )-----------( 216      ( 20 Nov 2023 07:34 )             31.5      11-20    139  |  104  |  22  ----------------------------<  116<H>  4.5   |  24  |  1.17    Ca    9.1      20 Nov 2023 07:33  Phos  2.6     11-20  Mg     1.8     11-20         CAPILLARY BLOOD GLUCOSE      POCT Blood Glucose.: 116 mg/dL (20 Nov 2023 07:31)  POCT Blood Glucose.: 120 mg/dL (19 Nov 2023 21:07)  POCT Blood Glucose.: 200 mg/dL (19 Nov 2023 17:22)  POCT Blood Glucose.: 279 mg/dL (19 Nov 2023 11:32)      RADIOLOGY & ADDITIONAL TESTS:    Imaging Personally Reviewed:  Consultant(s) Notes Reviewed:    Care Discussed with Consultants/Other Providers:

## 2023-11-20 NOTE — PROGRESS NOTE ADULT - SUBJECTIVE AND OBJECTIVE BOX
DATE OF SERVICE: 11-20-23 @ 13:34    Patient is a 78y old  Male who presents with a chief complaint of hypotension (20 Nov 2023 10:40)      INTERVAL HISTORY: Feels well, denies chest pain and SOB    REVIEW OF SYSTEMS:  CONSTITUTIONAL: No weakness  EYES/ENT: No visual changes;  No throat pain   NECK: No pain or stiffness  RESPIRATORY: No cough, wheezing; No shortness of breath  CARDIOVASCULAR: No chest pain or palpitations  GASTROINTESTINAL: No abdominal  pain. No nausea, vomiting, or hematemesis  GENITOURINARY: No dysuria, frequency or hematuria  NEUROLOGICAL: No stroke like symptoms  SKIN: No rashes    TELEMETRY Personally reviewed: SR 60-80 PVCs 24 beats of AT  	  MEDICATIONS:  metoprolol succinate ER 50 milliGRAM(s) Oral daily  sotalol. 80 milliGRAM(s) Oral every 12 hours        PHYSICAL EXAM:  T(C): 36.4 (11-20-23 @ 11:05), Max: 36.9 (11-19-23 @ 20:41)  HR: 78 (11-20-23 @ 11:05) (76 - 88)  BP: 115/66 (11-20-23 @ 11:05) (105/64 - 115/66)  RR: 18 (11-20-23 @ 11:05) (18 - 18)  SpO2: 98% (11-20-23 @ 11:05) (94% - 98%)  Wt(kg): --  I&O's Summary    19 Nov 2023 07:01  -  20 Nov 2023 07:00  --------------------------------------------------------  IN: 1040 mL / OUT: 850 mL / NET: 190 mL    20 Nov 2023 07:01  -  20 Nov 2023 13:34  --------------------------------------------------------  IN: 480 mL / OUT: 700 mL / NET: -220 mL          Appearance: In no distress	  HEENT:    PERRL, EOMI	  Cardiovascular:  S1 S2, No JVD  Respiratory: Lungs clear to auscultation	  Gastrointestinal:  Soft, Non-tender, + BS	  Vascularature:  No edema of LE  Psychiatric: Appropriate affect   Neuro: no acute focal deficits                               10.1   6.63  )-----------( 216      ( 20 Nov 2023 07:34 )             31.5     11-20    139  |  104  |  22  ----------------------------<  116<H>  4.5   |  24  |  1.17    Ca    9.1      20 Nov 2023 07:33  Phos  2.6     11-20  Mg     1.8     11-20          Labs personally reviewed      ASSESSMENT/PLAN: 	  78-year-old male multiple medical problems history of hepatocellular carcinoma status post radiation therapy, AF s/p DCCV on Eliquis who was found to be hypotensive following NST. Patient has reported general weakness, fatigue, lightheadedness since being discharged from hospital. Reports mild chest discomfort in midsternal region. Reports dyspnea with minimal exertion. Also reports significant lack of appetite and poor PO intake. No dark or bloody stool, nausea or vomiting.       Problem/Plan - 1:  ·  Problem: Hypotension  - Occurred s/p NST (no exercise component conducted)--> NST normal study as per Dr. Hameed  - Trop 91 --> lower than previous admission   - BUN/Cr elevated  - BP now recovering with fluid resuscitation. Patient reports significantly poor appetite and decreased PO intake since hospital discharge.  - Hypotension likely 2/2 hypovolemia.  - Follow up urine and blood cultures.  - rec adrenal insuffiencey work up      Problem/Plan - 2:  ·  Problem: Chest Pain  ·  Plan: Reported as mild intensity, midsternal, non-exertional and self resolves on its own.  - Trop 91 (similar to prior admission)  - Hx of recent PCI  - TTE from 10/25 shows preserved EF and no WMA  - NST 11/16 wnl  - CP unlikely cardiac in etiology.   - Chest pain now resolved.      Problem/Plan - 3:  ·  Problem: Shortness of Breath  ·  Plan: ECG non-ischemic  - Prior TTE wnl.   - CXR shows clear lungs  - Patient was not discharged on his usual Torsemide following previous hospital admission d/t RAZIA.   - BNP 2442 (lower than previous DC)  - Maintain off Torsemide and consider lower dose as OP if need be     Problem/Plan - 4:  ·  Problem: CAD.   ·  Plan: Recent PCI to pLAD in June 2023  - ECG non-ischemic  - Trop as noted above  - c/w Plavix and statin     Problem/Plan - 5:  ·  Problem: RAZIA   ·  Plan: RAZIA possibly pre-renal d/t poor intake.   - Cont to hold spironolactone also i/s/o hyperkalemia  - Was restarted on Torsemide 40mg PO BID as OP -- discontinue  - s/p IVF     Problem/Plan - 6:  ·  Problem: Atrial Fibrillation  - s/p succesful DCCV 10/31  - Cont Eliquis 5mg BID  - C/w of Sotalol 80mg PO BID   - 11/19 24 beats of AT overnight  - EP consult               ISAMAR Foster DO [facility]  Cardiovascular Medicine  [address] [place] [address], [address]  Available via call or text on [employer]  Office: [phone]

## 2023-11-20 NOTE — PROGRESS NOTE ADULT - SUBJECTIVE AND OBJECTIVE BOX
Patient is a 78y old  Male who presents with a chief complaint of hypotension (20 Nov 2023 10:16)    Patient seen and examined. Patient resting in bed, no new complaints offered.    MEDICATIONS  (STANDING):  allopurinol 100 milliGRAM(s) Oral daily  atorvastatin 80 milliGRAM(s) Oral at bedtime  chlorhexidine 2% Cloths 1 Application(s) Topical <User Schedule>  clopidogrel Tablet 75 milliGRAM(s) Oral daily  dextrose 5%. 1000 milliLiter(s) (50 mL/Hr) IV Continuous <Continuous>  dextrose 5%. 1000 milliLiter(s) (100 mL/Hr) IV Continuous <Continuous>  dextrose 50% Injectable 25 Gram(s) IV Push once  dextrose 50% Injectable 12.5 Gram(s) IV Push once  dextrose 50% Injectable 25 Gram(s) IV Push once  glucagon  Injectable 1 milliGRAM(s) IntraMuscular once  insulin lispro (ADMELOG) corrective regimen sliding scale   SubCutaneous Before meals and at bedtime  lidocaine   4% Patch 1 Patch Transdermal daily  metoprolol succinate ER 50 milliGRAM(s) Oral daily  pantoprazole    Tablet 40 milliGRAM(s) Oral before breakfast  sotalol. 80 milliGRAM(s) Oral every 12 hours    MEDICATIONS  (PRN):  acetaminophen     Tablet .. 650 milliGRAM(s) Oral every 6 hours PRN Temp greater or equal to 38C (100.4F), Mild Pain (1 - 3)  aluminum hydroxide/magnesium hydroxide/simethicone Suspension 30 milliLiter(s) Oral every 4 hours PRN Dyspepsia  dextrose Oral Gel 15 Gram(s) Oral once PRN Blood Glucose LESS THAN 70 milliGRAM(s)/deciliter  melatonin 3 milliGRAM(s) Oral at bedtime PRN Insomnia  ondansetron Injectable 4 milliGRAM(s) IV Push every 8 hours PRN Nausea and/or Vomiting      Vital Signs Last 24 Hrs  T(C): 36.9 (20 Nov 2023 04:50), Max: 36.9 (19 Nov 2023 20:41)  T(F): 98.5 (20 Nov 2023 04:50), Max: 98.5 (20 Nov 2023 04:50)  HR: 76 (20 Nov 2023 04:50) (76 - 88)  BP: 106/65 (20 Nov 2023 04:50) (105/64 - 114/65)  BP(mean): --  RR: 18 (20 Nov 2023 04:50) (18 - 18)  SpO2: 94% (20 Nov 2023 04:50) (94% - 97%)    Parameters below as of 20 Nov 2023 04:50  Patient On (Oxygen Delivery Method): room air        PE  NAD  Awake, alert  Anicteric, MMM  RRR  CTAB  Abd soft, NT, ND  No c/c/e  No rash grossly  FROM                          10.1   6.63  )-----------( 216      ( 20 Nov 2023 07:34 )             31.5       11-20    139  |  104  |  22  ----------------------------<  116<H>  4.5   |  24  |  1.17    Ca    9.1      20 Nov 2023 07:33  Phos  2.6     11-20  Mg     1.8     11-20

## 2023-11-20 NOTE — PROGRESS NOTE ADULT - ASSESSMENT
79 y/o M with hx of CAD s/p NURIA to distal LAD in 6/2023, HTN, HLD, prior EtOH disorder (denies current use), T2DM, Scoliosis, spinal surgeries, Metastatic Pancreatic neuroendocrine tumor on Lutathera (first treatment on 10/20/23 at [facility]), presenting with fatigue and chest pain.Recent admission for new onset AF s/p successful TRACY/DCCV on 10/31/23 with Sotalol started post DCCV. He now is admitted s/p reported hypotension and feelings of fatigue before and after OP stress test. EP consulted for MM NSVT up to 24 beats on telemetry.     1) history of pAF s/p TRACY DCCV, on Sotalol and Eliquis   2) History of CAD s/p NURIA   3) NSVT on tele   4) metastatic pancreatic neuroendocrine tumor     - Continue Sotalol 80mg bid, Metoprolol and Eliquis. QTC calculated by me today 444ms   - For cardiac cath today as per Cardiology. Patient with MM NSVT, unlikely to be ischemic   - Resume Eliquis when feasible   - Continue tele monitoring   - Replete lytes for K>4 and Mg>2

## 2023-11-20 NOTE — PROGRESS NOTE ADULT - PROBLEM SELECTOR PLAN 7
On torsemide which was recently stopped, as well as aldactone. Currently has trace to no edema B/L  - no HF hx- will hold diuretics for now  - pt reports recent LE duplex which was negative in the last month, low suspicion for DVT

## 2023-11-21 LAB
ANION GAP SERPL CALC-SCNC: 11 MMOL/L — SIGNIFICANT CHANGE UP (ref 5–17)
ANION GAP SERPL CALC-SCNC: 11 MMOL/L — SIGNIFICANT CHANGE UP (ref 5–17)
BUN SERPL-MCNC: 22 MG/DL — SIGNIFICANT CHANGE UP (ref 7–23)
BUN SERPL-MCNC: 22 MG/DL — SIGNIFICANT CHANGE UP (ref 7–23)
CALCIUM SERPL-MCNC: 9.5 MG/DL — SIGNIFICANT CHANGE UP (ref 8.4–10.5)
CALCIUM SERPL-MCNC: 9.5 MG/DL — SIGNIFICANT CHANGE UP (ref 8.4–10.5)
CHLORIDE SERPL-SCNC: 105 MMOL/L — SIGNIFICANT CHANGE UP (ref 96–108)
CHLORIDE SERPL-SCNC: 105 MMOL/L — SIGNIFICANT CHANGE UP (ref 96–108)
CO2 SERPL-SCNC: 22 MMOL/L — SIGNIFICANT CHANGE UP (ref 22–31)
CO2 SERPL-SCNC: 22 MMOL/L — SIGNIFICANT CHANGE UP (ref 22–31)
CREAT SERPL-MCNC: 1 MG/DL — SIGNIFICANT CHANGE UP (ref 0.5–1.3)
CREAT SERPL-MCNC: 1 MG/DL — SIGNIFICANT CHANGE UP (ref 0.5–1.3)
CULTURE RESULTS: SIGNIFICANT CHANGE UP
EGFR: 77 ML/MIN/1.73M2 — SIGNIFICANT CHANGE UP
EGFR: 77 ML/MIN/1.73M2 — SIGNIFICANT CHANGE UP
GLUCOSE BLDC GLUCOMTR-MCNC: 121 MG/DL — HIGH (ref 70–99)
GLUCOSE BLDC GLUCOMTR-MCNC: 121 MG/DL — HIGH (ref 70–99)
GLUCOSE BLDC GLUCOMTR-MCNC: 132 MG/DL — HIGH (ref 70–99)
GLUCOSE BLDC GLUCOMTR-MCNC: 132 MG/DL — HIGH (ref 70–99)
GLUCOSE BLDC GLUCOMTR-MCNC: 149 MG/DL — HIGH (ref 70–99)
GLUCOSE BLDC GLUCOMTR-MCNC: 149 MG/DL — HIGH (ref 70–99)
GLUCOSE BLDC GLUCOMTR-MCNC: 194 MG/DL — HIGH (ref 70–99)
GLUCOSE BLDC GLUCOMTR-MCNC: 194 MG/DL — HIGH (ref 70–99)
GLUCOSE SERPL-MCNC: 145 MG/DL — HIGH (ref 70–99)
GLUCOSE SERPL-MCNC: 145 MG/DL — HIGH (ref 70–99)
HCT VFR BLD CALC: 33.1 % — LOW (ref 39–50)
HCT VFR BLD CALC: 33.1 % — LOW (ref 39–50)
HGB BLD-MCNC: 10.3 G/DL — LOW (ref 13–17)
HGB BLD-MCNC: 10.3 G/DL — LOW (ref 13–17)
MAGNESIUM SERPL-MCNC: 1.8 MG/DL — SIGNIFICANT CHANGE UP (ref 1.6–2.6)
MAGNESIUM SERPL-MCNC: 1.8 MG/DL — SIGNIFICANT CHANGE UP (ref 1.6–2.6)
MCHC RBC-ENTMCNC: 28.9 PG — SIGNIFICANT CHANGE UP (ref 27–34)
MCHC RBC-ENTMCNC: 28.9 PG — SIGNIFICANT CHANGE UP (ref 27–34)
MCHC RBC-ENTMCNC: 31.1 GM/DL — LOW (ref 32–36)
MCHC RBC-ENTMCNC: 31.1 GM/DL — LOW (ref 32–36)
MCV RBC AUTO: 93 FL — SIGNIFICANT CHANGE UP (ref 80–100)
MCV RBC AUTO: 93 FL — SIGNIFICANT CHANGE UP (ref 80–100)
NRBC # BLD: 0 /100 WBCS — SIGNIFICANT CHANGE UP (ref 0–0)
NRBC # BLD: 0 /100 WBCS — SIGNIFICANT CHANGE UP (ref 0–0)
PHOSPHATE SERPL-MCNC: 2.9 MG/DL — SIGNIFICANT CHANGE UP (ref 2.5–4.5)
PHOSPHATE SERPL-MCNC: 2.9 MG/DL — SIGNIFICANT CHANGE UP (ref 2.5–4.5)
PLATELET # BLD AUTO: 243 K/UL — SIGNIFICANT CHANGE UP (ref 150–400)
PLATELET # BLD AUTO: 243 K/UL — SIGNIFICANT CHANGE UP (ref 150–400)
POTASSIUM SERPL-MCNC: 4.2 MMOL/L — SIGNIFICANT CHANGE UP (ref 3.5–5.3)
POTASSIUM SERPL-MCNC: 4.2 MMOL/L — SIGNIFICANT CHANGE UP (ref 3.5–5.3)
POTASSIUM SERPL-SCNC: 4.2 MMOL/L — SIGNIFICANT CHANGE UP (ref 3.5–5.3)
POTASSIUM SERPL-SCNC: 4.2 MMOL/L — SIGNIFICANT CHANGE UP (ref 3.5–5.3)
RBC # BLD: 3.56 M/UL — LOW (ref 4.2–5.8)
RBC # BLD: 3.56 M/UL — LOW (ref 4.2–5.8)
RBC # FLD: 15.1 % — HIGH (ref 10.3–14.5)
RBC # FLD: 15.1 % — HIGH (ref 10.3–14.5)
SODIUM SERPL-SCNC: 138 MMOL/L — SIGNIFICANT CHANGE UP (ref 135–145)
SODIUM SERPL-SCNC: 138 MMOL/L — SIGNIFICANT CHANGE UP (ref 135–145)
SPECIMEN SOURCE: SIGNIFICANT CHANGE UP
WBC # BLD: 6.58 K/UL — SIGNIFICANT CHANGE UP (ref 3.8–10.5)
WBC # BLD: 6.58 K/UL — SIGNIFICANT CHANGE UP (ref 3.8–10.5)
WBC # FLD AUTO: 6.58 K/UL — SIGNIFICANT CHANGE UP (ref 3.8–10.5)
WBC # FLD AUTO: 6.58 K/UL — SIGNIFICANT CHANGE UP (ref 3.8–10.5)

## 2023-11-21 PROCEDURE — 99233 SBSQ HOSP IP/OBS HIGH 50: CPT

## 2023-11-21 PROCEDURE — 93454 CORONARY ARTERY ANGIO S&I: CPT | Mod: 26,59

## 2023-11-21 PROCEDURE — 93010 ELECTROCARDIOGRAM REPORT: CPT

## 2023-11-21 PROCEDURE — 92928 PRQ TCAT PLMT NTRAC ST 1 LES: CPT | Mod: LD

## 2023-11-21 PROCEDURE — 99152 MOD SED SAME PHYS/QHP 5/>YRS: CPT

## 2023-11-21 RX ORDER — MAGNESIUM SULFATE 500 MG/ML
1 VIAL (ML) INJECTION ONCE
Refills: 0 | Status: COMPLETED | OUTPATIENT
Start: 2023-11-21 | End: 2023-11-21

## 2023-11-21 RX ORDER — SODIUM CHLORIDE 9 MG/ML
1000 INJECTION INTRAMUSCULAR; INTRAVENOUS; SUBCUTANEOUS
Refills: 0 | Status: DISCONTINUED | OUTPATIENT
Start: 2023-11-21 | End: 2023-11-22

## 2023-11-21 RX ORDER — ASPIRIN/CALCIUM CARB/MAGNESIUM 324 MG
81 TABLET ORAL DAILY
Refills: 0 | Status: DISCONTINUED | OUTPATIENT
Start: 2023-11-21 | End: 2023-11-22

## 2023-11-21 RX ORDER — SODIUM CHLORIDE 9 MG/ML
250 INJECTION INTRAMUSCULAR; INTRAVENOUS; SUBCUTANEOUS ONCE
Refills: 0 | Status: DISCONTINUED | OUTPATIENT
Start: 2023-11-21 | End: 2023-11-22

## 2023-11-21 RX ORDER — SODIUM CHLORIDE 9 MG/ML
1000 INJECTION INTRAMUSCULAR; INTRAVENOUS; SUBCUTANEOUS
Refills: 0 | Status: DISCONTINUED | OUTPATIENT
Start: 2023-11-21 | End: 2023-11-21

## 2023-11-21 RX ADMIN — Medication 80 MILLIGRAM(S): at 21:02

## 2023-11-21 RX ADMIN — Medication 80 MILLIGRAM(S): at 05:17

## 2023-11-21 RX ADMIN — Medication 0.6 MILLIGRAM(S): at 21:02

## 2023-11-21 RX ADMIN — LIDOCAINE 1 PATCH: 4 CREAM TOPICAL at 11:07

## 2023-11-21 RX ADMIN — Medication 100 MILLIGRAM(S): at 11:07

## 2023-11-21 RX ADMIN — Medication 100 GRAM(S): at 11:56

## 2023-11-21 RX ADMIN — ATORVASTATIN CALCIUM 80 MILLIGRAM(S): 80 TABLET, FILM COATED ORAL at 21:02

## 2023-11-21 RX ADMIN — CLOPIDOGREL BISULFATE 75 MILLIGRAM(S): 75 TABLET, FILM COATED ORAL at 11:06

## 2023-11-21 RX ADMIN — LIDOCAINE 1 PATCH: 4 CREAM TOPICAL at 21:11

## 2023-11-21 RX ADMIN — Medication 1: at 11:43

## 2023-11-21 RX ADMIN — CHLORHEXIDINE GLUCONATE 1 APPLICATION(S): 213 SOLUTION TOPICAL at 05:14

## 2023-11-21 RX ADMIN — LIDOCAINE 1 PATCH: 4 CREAM TOPICAL at 23:43

## 2023-11-21 RX ADMIN — Medication 50 MILLIGRAM(S): at 05:14

## 2023-11-21 RX ADMIN — SODIUM CHLORIDE 75 MILLILITER(S): 9 INJECTION INTRAMUSCULAR; INTRAVENOUS; SUBCUTANEOUS at 17:56

## 2023-11-21 RX ADMIN — PANTOPRAZOLE SODIUM 40 MILLIGRAM(S): 20 TABLET, DELAYED RELEASE ORAL at 05:14

## 2023-11-21 NOTE — PROGRESS NOTE ADULT - PROBLEM SELECTOR PLAN 3
Acute onset left ankle pain, + tenderness on palpation, + mild swelling, no erythema, no warmth, no open wound, pain limited active range of motion   patient reports history of gout, though usually involves big toe.  Exam not of L ankle not entirely consistent with gout, as no erythema, no warmth , only mild swelling , will f/u ESR, CRP, uric acid  patient declines CXR currently   pain regimen
Pt states he had some sort of radio/embolization procedure recently after which his appetite diminished  - we discussed possible appetite stimulants including megace and marinol/dronabinol- including risks such as VTE and behavioral changes; pt opting to forego pharmacologic appetite stimulation  - plans to eat meals on scheduled basis as though they are medications  - nutrition consult
Pt states he had some sort of radio/embolization procedure recently after which his appetite diminished  - we discussed possible appetite stimulants including megace and marinol/dronabinol- including risks such as VTE and behavioral changes; pt opting to forego pharmacologic appetite stimulation  - plans to eat meals on scheduled basis as though they are medications  - nutrition consult
s/p DCCV on eliquis  - reviewed ECG 11/17: NSR, QTc 457, no ST elevation, no ST depression.   - 24 beats wide complex tachycardia EP consulted, f/u rec; increase metoprolol ER to 50 mg daily, replete Mg -->  cont sotalol
s/p DCCV on eliquis  - reviewed ECG 11/17: NSR, QTc 457, no ST elevation, no ST depression.   - 24 beats wide complex tachycardia EP consulted, f/u rec; increase metoprolol ER to 50 mg daily, replete Mg -->  cont sotalol

## 2023-11-21 NOTE — PROGRESS NOTE ADULT - SUBJECTIVE AND OBJECTIVE BOX
[facility] PA[facility] MANAGEMENT FOLLOW UP VISIT      PCP: CHENCHO Rivera    Chief Complaint   Patient presents with   • Pain     Right knee     INTERVAL:  Vlad Hernandez presents for follow-up of chronic right knee pain status post right genicular RFL reporting 60% for approximately 5 weeks. He noticed significant pain relief and increased range of motion during this time. His right knee pain has started to return thereafter, however, he is experiencing 20% sustained relief. His right knee pain is constant, sharp, stabbing, 4/10 VNAS on average and 7/10 VNAS at its worst. His pain is exacerbated by sitting, bending, and alleviated by standing and walking. He presents to discuss dorsal root ganglion stimulation.     He discontinued Norco 7.5 mg q 8 hrs PRN and Elavil 10 mg due to GI upset and sedation. He reports that he continues an at-home PT regimen.     PE    HPI (from initial visit):   Vlad Hernandez is a 52 year old male with chronic right knee pain as described below.  Referred by Ananth Ricks DO for consultation and management.    Pain Score (0-10): Current 3/10; Worst 6/10;  Least 3/10;  Average 3/10;  Acceptable 0/10  Duration: 5 years   Context of pain: Pain started following a workplace injury in  where he was standing on a conveyer belt and took a misstep off. He fell from the conveyer belt and hyperextended his right knee. His pain worsened following right total knee arthroplasty. He has since undergone multiple revision operations and injections as detailed below  Location: Right knee  Radiation: None  Quality: burning, sharp, stabbing  Exacerbates:? Flexion of knee, walking, standing, climbing/descending stairs   Improves: Ice, stretching    Numbness/Tingling: Right knee  Weakness: None  Sleep: 4-6 hours  Mood: depressed   ?  Bowel and bladder - Denies new onset incontinence, or saddle anesthesia.     INTERVENTIONS:  1. Injections:    · Right knee intra-articular steroid injection x 3    · Right knee synovisc   · 01/17/27 genicular nerve injection trial with Dr. Desai   · 01/27/17 genicular nerve RFA with Dr. Desai   · 06/19/19 Genicular nerve block, right  · 07/03/19 Radiofrequency Ablation Genicular, Right  2.  Physical Therapy: Currently in a structured PT program. Completed 8 weeks prior to this visit.    3. Surgeries (Spine, Joint, related to pain):   · 06/09/15 right total knee arthroplasty with Dr. Verduzco  · 09/03/15 right knee closed manipulation with Dr. Verduzco  · 11/09/15 right knee arthroscopy, partial synovectomy    · 08/25/16 arthroplasty revision total knee with Dr. Ovalles   · 03/27/19 Knee revision, right with Dr. Ricks   · 05/01/19 Knee manipulation with Dr. Ricks  4. Medications (pain/mood/depression): (with doses, duration of use, side effects, etc...)  Current  · Advil   Previous  · Gabapentin   · Warfarin 5 mg   · Elavil  · Hydrocodone   · Tramadol   5. Other:    Patient Active Problem List   Diagnosis   • Preop general physical exam   • History of DVT (deep vein thrombosis)   • Chronic knee pain after total replacement of right knee joint   • Long term (current) use of anticoagulants [Z79.01]   • Chronic pain of right knee       Past Medical History:   Diagnosis Date   • Degenerative joint disease    • DVT (deep venous thrombosis) (CMS/[facility]) 2014    right lower leg following knee surgery   • Hematuria        Current Outpatient Medications   Medication   • DULoxetine (CYMBALTA) 30 MG capsule   • [START ON 9/10/2019] DULoxetine (CYMBALTA) 60 MG capsule     No current facility-administered medications for this visit.        ALLERGIES:  No Known Allergies    Social History     Socioeconomic History   • Marital status: /Civil Union     Spouse name: Not on file   • Number of children: Not on file   • Years of education: Not on file   • Highest education level: Not on file   Occupational History   • Not on file   Social Needs   • Financial resource strain: Not on  file   • Food insecurity:     Worry: Not on file     Inability: Not on file   • Transportation needs:     Medical: Not on file     Non-medical: Not on file   Tobacco Use   • Smoking status: Former Smoker     Years: 10.00     Start date:      Last attempt to quit: 11/10/2002     Years since quittin.7   • Smokeless tobacco: Never Used   Substance and Sexual Activity   • Alcohol use: Yes     Alcohol/week: 4.2 standard drinks     Types: 5 Standard drinks or equivalent per week     Frequency: Monthly or less     Drinks per session: 1 or 2     Binge frequency: Never     Comment: Socially   • Drug use: No   • Sexual activity: Not on file   Lifestyle   • Physical activity:     Days per week: Not on file     Minutes per session: Not on file   • Stress: Not on file   Relationships   • Social connections:     Talks on phone: Not on file     Gets together: Not on file     Attends Faith service: Not on file     Active member of club or organization: Not on file     Attends meetings of clubs or organizations: Not on file     Relationship status: Not on file   • Intimate partner violence:     Fear of current or ex partner: Not on file     Emotionally abused: Not on file     Physically abused: Not on file     Forced sexual activity: Not on file   Other Topics Concern   • Not on file   Social History Narrative   • Not on file       Family History   Problem Relation Age of Onset   • Diabetes Mother    • Sleep Apnea Father    • Stroke Father    • Diabetes Sister    • Diabetes Brother    • Patient is unaware of any medical problems Daughter    • Patient is unaware of any medical problems Son    • Patient is unaware of any medical problems Sister    • Patient is unaware of any medical problems Sister    • Patient is unaware of any medical problems Brother    • Patient is unaware of any medical problems Brother    • Patient is unaware of any medical problems Daughter    • Patient is unaware of any medical problems Son    •    Overnight Events: NAEO    Review Of Systems: No chest pain, shortness of breath, or palpitations            Current Meds:  acetaminophen     Tablet .. 650 milliGRAM(s) Oral every 6 hours PRN  allopurinol 100 milliGRAM(s) Oral daily  aluminum hydroxide/magnesium hydroxide/simethicone Suspension 30 milliLiter(s) Oral every 4 hours PRN  atorvastatin 80 milliGRAM(s) Oral at bedtime  chlorhexidine 2% Cloths 1 Application(s) Topical <User Schedule>  clopidogrel Tablet 75 milliGRAM(s) Oral daily  colchicine 0.6 milliGRAM(s) Oral every 24 hours  dextrose 5%. 1000 milliLiter(s) IV Continuous <Continuous>  dextrose 5%. 1000 milliLiter(s) IV Continuous <Continuous>  dextrose 50% Injectable 25 Gram(s) IV Push once  dextrose 50% Injectable 12.5 Gram(s) IV Push once  dextrose 50% Injectable 25 Gram(s) IV Push once  dextrose Oral Gel 15 Gram(s) Oral once PRN  glucagon  Injectable 1 milliGRAM(s) IntraMuscular once  insulin lispro (ADMELOG) corrective regimen sliding scale   SubCutaneous Before meals and at bedtime  lidocaine   4% Patch 1 Patch Transdermal daily  melatonin 3 milliGRAM(s) Oral at bedtime PRN  metoprolol succinate ER 50 milliGRAM(s) Oral daily  ondansetron Injectable 4 milliGRAM(s) IV Push every 8 hours PRN  pantoprazole    Tablet 40 milliGRAM(s) Oral before breakfast  sotalol. 80 milliGRAM(s) Oral every 12 hours      Vitals:  T(F): 98.1 (11-21), Max: 98.4 (11-20)  HR: 75 (11-21) (75 - 79)  BP: 120/70 (11-21) (108/64 - 122/68)  RR: 18 (11-21)  SpO2: 96% (11-21)  I&O's Summary    20 Nov 2023 07:01  -  21 Nov 2023 07:00  --------------------------------------------------------  IN: 960 mL / OUT: 1225 mL / NET: -265 mL        Physical Exam:  GEN: comfortable appearing, lying in bed in NAD  HEENT: NCAT, MMM  CV: Regular S1, S2, no m/r/g  RESP: CTAB  ABD: Soft, NTND, +BS  EXT: No LE edema, WWP, pulses palpable throughout  NEURO: No focal deficits, AOx3  SKIN:  No rashes                          10.3   6.58  )-----------( 243      ( 21 Nov 2023 06:30 )             33.1     11-21    138  |  105  |  22  ----------------------------<  145<H>  4.2   |  22  |  1.00    Ca    9.5      21 Nov 2023 06:30  Phos  2.9     11-21  Mg     1.8     11-21        CARDIAC MARKERS ( 16 Nov 2023 15:53 )  101 ng/L / x     / x     / x     / x     / x      CARDIAC MARKERS ( 16 Nov 2023 14:16 )  91 ng/L / x     / x     / x     / x     / x                       Patient is unaware of any medical problems Maternal Grandmother    • Patient is unaware of any medical problems Maternal Grandfather    • Patient is unaware of any medical problems Paternal Grandmother    • Patient is unaware of any medical problems Paternal Grandfather      REVIEW OF SYSTEMS (bold is positive):   ??GENERAL: Denies fatigue, fever, loss of appetite, unexplained weight loss.  SKIN: Denies rash, itching, and jaundice.  ýHEENT: Denies blurred vision, glaucoma, hoarseness.  ýýCV: Denies palpitations, leg swellingý.  ýPULM: Denies Shortness of breath, cough.  ýGI: Denies heartburn, loss of bowel control, constipation.  ýGU: Denies loss of bladder control, urinary retention. ý  ýýHEM/LYMPH: Denies unusual bleeding, easy bruising.  MSK: As stated in HPI.   ýNEURO: As stated in HPI.   ýPSYCH: Denies anxiety, insomnia, panic attacks.    PHYSICAL EXAMINATION:  Constitutional: NAD  Psychiatric: Alert, awake, and oriented.  Ears, nose, mouth, and throat: Atraumatic, mucous membranes moist.  Resp: Normal respiratory excursion.  Cardiovascular: Palpable peripheral pulses.  Extremities: No c/c/e.  Skin: Warm, dry, intact. no rashes.  ýMSK: Full ROM in all major joints.  ý  Hip  Gait- antalgic  ý  Knees:  Right- Well-healed midline incision. Mild tenderness to palpation along inferior-medial joint line. No effusion, no erythema. Warm to touch     Left-  nontender to palpation along medial and lateral joint line, no effusion, no erythema      IMAGING STUDIES:   Results for orders placed in visit on 06/23/15   XR KNEE 3 VW RIGHT    Narrative 3 plain radiographs AP, lateral and patellofemoral views of the right knee   ordered, interpreted, and reviewed today by myself the patient. Today's   x-rays show stable alignment of the total knee arthroplasty components as   compared to previous postsurgical radiographs. There are no signs of   fracture, loosening or dislocation.        ASSESSMENT AND PLAN:  Vlad Hernandez is a  52 year old male with chronic right knee pain following post total knee arthroplasty pain.  His past medical history is significant for multiple right knee injections and surgeries as detailed above. He has failed conservative treatment including PT and injections. He reports that he had adequate pain relief following right genicular radiofrequency lesioning for five weeks. Thereafter, his right knee pain has returned (20% sustained relief).      He reports he will follow up with Dr. Ricks to discuss potential surgical interventions. We discussed the benefits of incorporating neuromodulation into this treatment plan should he not require revision operation with Dr. Ricks. We also discussed the risks including but not limited to: lead fracture, lead migration, bleeding, infection, and nerve damage. I discussed with Donny that the likely generator of his pain is neuropathic. We will continue to target this pain with medications and interventions as detailed below.     DIAGNOSIS:  1. Chronic pain of right knee    2. Chronic knee pain after total replacement of right knee joint      PLAN:  1. Medications:  · Start Cymbalta 30 mg daily x 4 weeks. If tolerated, increase to Cymbalta 60 mg daily after 4 weeks.   · In future, consider Lyrica, Gabapentin.   2. Diagnostics: None indicated at this time.  3. Interventions: None indicated at this time.   4. Devices: None indicated at this time. In the future, consider right L3, L4 dorsal root ganglion stimulator trial.   5. Consults: In the future, consider behavioral health clearance for spinal cord stimulator trial.   6. Physical Therapy: Recommend ongoing activity as tolerated.  7. Opioid Risk Assessment:   · Pain psych assessment:  · Urine tox screen N/A  · Prescription Drug Monitoring Program verified this visit  · Opioid contract N/A  · Discussed with patient the risks/benefits of opioids  8. Follow-up in 4-6 weeks CHENCHO Hughes, or CHENCHO Vance.       Tom Rodriguez MD, NANO. NADYA  Interventional Pain Medicine  [place], [place]    Future Appointments   Date Time Provider Department Center   8/22/2019  2:15 PM [facility]O [facility]   8/22/2019  3:00 PM [facility] MRI [id number]   8/26/2019  4:00 PM Ananth Brown PA-C [id number]   9/26/2019  7:15 AM CHENCHO Vance [facility]IN [facility]   10/4/2019  8:00 AM Ananth Ricks DO [facility]         Justification for interventional therapy:  · Patient with average pain > 6/10.  · Patient has exhausted conservative therapy.  · Patient continuing home exercise program.  · Previous injection provided >50% relief x > 2 weeks.    The risks, consequences, alternatives, and benefits of various treatment options were discussed with the patient in great detail, including conservative management, injections and procedures.    On 8/13/2019, Jaycee SOLOMON scribed the services personally performed by Tom Rodriguez MD     The documentation recorded by the scribe accurately and completely reflects the service(s) I personally performed and the decisions made by me.

## 2023-11-21 NOTE — CHART NOTE - NSCHARTNOTEFT_GEN_A_CORE
Left message [phone], unable to leave message [phone]. Did not answer hospital phone at [facility]

## 2023-11-21 NOTE — PROGRESS NOTE ADULT - SUBJECTIVE AND OBJECTIVE BOX
Patient is a 78y old  Male who presents with a chief complaint of hypotension (21 Nov 2023 10:38)    Patient seen and examined at bedside this morning.  Patient resting comfortably in bed, no new complaints reported.    MEDICATIONS  (STANDING):  allopurinol 100 milliGRAM(s) Oral daily  atorvastatin 80 milliGRAM(s) Oral at bedtime  chlorhexidine 2% Cloths 1 Application(s) Topical <User Schedule>  clopidogrel Tablet 75 milliGRAM(s) Oral daily  colchicine 0.6 milliGRAM(s) Oral every 24 hours  dextrose 5%. 1000 milliLiter(s) (100 mL/Hr) IV Continuous <Continuous>  dextrose 5%. 1000 milliLiter(s) (50 mL/Hr) IV Continuous <Continuous>  dextrose 50% Injectable 25 Gram(s) IV Push once  dextrose 50% Injectable 12.5 Gram(s) IV Push once  dextrose 50% Injectable 25 Gram(s) IV Push once  glucagon  Injectable 1 milliGRAM(s) IntraMuscular once  insulin lispro (ADMELOG) corrective regimen sliding scale   SubCutaneous Before meals and at bedtime  lidocaine   4% Patch 1 Patch Transdermal daily  metoprolol succinate ER 50 milliGRAM(s) Oral daily  pantoprazole    Tablet 40 milliGRAM(s) Oral before breakfast  sotalol. 80 milliGRAM(s) Oral every 12 hours    MEDICATIONS  (PRN):  acetaminophen     Tablet .. 650 milliGRAM(s) Oral every 6 hours PRN Temp greater or equal to 38C (100.4F), Mild Pain (1 - 3)  aluminum hydroxide/magnesium hydroxide/simethicone Suspension 30 milliLiter(s) Oral every 4 hours PRN Dyspepsia  dextrose Oral Gel 15 Gram(s) Oral once PRN Blood Glucose LESS THAN 70 milliGRAM(s)/deciliter  melatonin 3 milliGRAM(s) Oral at bedtime PRN Insomnia  ondansetron Injectable 4 milliGRAM(s) IV Push every 8 hours PRN Nausea and/or Vomiting    Vital Signs Last 24 Hrs  T(C): 36.6 (21 Nov 2023 10:51), Max: 36.9 (20 Nov 2023 20:09)  T(F): 97.9 (21 Nov 2023 10:51), Max: 98.4 (20 Nov 2023 20:09)  HR: 75 (21 Nov 2023 10:51) (75 - 79)  BP: 116/57 (21 Nov 2023 10:51) (108/64 - 122/68)  BP(mean): --  RR: 18 (21 Nov 2023 10:51) (18 - 18)  SpO2: 98% (21 Nov 2023 10:51) (95% - 98%)    Parameters below as of 21 Nov 2023 10:51  Patient On (Oxygen Delivery Method): room air        PE  NAD  Awake, alert  Anicteric, MMM  RRR  CTAB  Abd soft, NT, ND  No c/c/e  No rash grossly  FROM                          10.3   6.58  )-----------( 243      ( 21 Nov 2023 06:30 )             33.1       11-21    138  |  105  |  22  ----------------------------<  145<H>  4.2   |  22  |  1.00    Ca    9.5      21 Nov 2023 06:30  Phos  2.9     11-21  Mg     1.8     11-21

## 2023-11-21 NOTE — PROGRESS NOTE ADULT - PROBLEM SELECTOR PLAN 6
hold metfomin inpatient- cont LDSS and fingerstic monitoring  A1c 6.5
hold metfomin inpatient- cont LDSS and fingerstic monitoring  A1c 6.5
On torsemide which was recently stopped, as well as aldactone. Currently has trace to no edema B/L  - no HF hx- will hold diuretics for now  - pt reports recent LE duplex which was negative in the last month, low suspicion for DVT

## 2023-11-21 NOTE — PROGRESS NOTE ADULT - PROBLEM SELECTOR PROBLEM 6
[FreeTextEntry1] : Ms. 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  She is currently on Keytruda. \par \par She is presenting today for evaluation of right low back pain.  She reports this pain has been present for at least 3 months.  Possible correlation with Keytruda usage.  She describes a radiating pain down to her right anterior thigh.  Pain is worse with lying down.  Pain is better with fentanyl and oxycodone.  She also finds it difficult to ambulate.  She also has weakness with right hip flexion.  She denies any bowel bladder problems.
Diabetes
Diabetes
Leg edema

## 2023-11-21 NOTE — PROVIDER CONTACT NOTE (OTHER) - DATE AND TIME:
21-Nov-2023 06:55
21-Nov-2023 15:50
19-Nov-2023 08:09
18-Nov-2023 13:15
21-Nov-2023 08:02
20-Nov-2023 04:47

## 2023-11-21 NOTE — PROGRESS NOTE ADULT - ASSESSMENT
NANDO HERNANDEZ is a 78y Male who presents with a chief complaint of hypotension.    Metastatic Pancreatic Neuroendocrine Tumor  ·	Patient follows with Dr. Sophia Prasad, [facility].  ·	Recent progression of disease; last received Lutetium Edith-177 LUTATHERA on October 20th.  ·	No systemic therapy while inpatient or during rehabilitation  ·	ongoing care after discharge    Anemia  ·	at baseline  ·	In the setting of inflammation, active malignancy  ·	Monitor and maintain HGB > 7    Hypotension  ·	cardiology consulted  ·	BP now recovering with fluid resuscitation. Patient reports significantly poor appetite and decreased PO intake since hospital discharge.  ·	Hypotension likely 2/2 hypovolemia.  ·	blood and urine cultures negative     Chest Pain  ·	now resolved  ·	Reported as mild intensity, midsternal, non-exertional and self resolves on its own.  ·	TTE from 10/25 shows preserved EF and no WMA  ·	EP consulted for WCTs  ·	plan for cardiac cath today as per Cardiology    will follow,    Christen Rosales NP  Hematology/ Oncology  [facility]  [phone] (office)  [phone] (alt office)  Evenings and weekends please call MD on call or office

## 2023-11-21 NOTE — PROGRESS NOTE ADULT - PROBLEM SELECTOR PLAN 4
patient reports history of gout, though usually involves big toe.   Xray reviewed - no fractures.   Uric acid is less than 6.  c/w colchicine for possible Gout, he is symptomatically improving
patient reports history of gout, though usually involves big toe.   Xray reviewed - no fractures.   Uric acid is less than 6.  Will start trial of colchicine for possible Gout
hold metfomin inpatient- cont LDSS and fingerstic monitoring  A1c 6.5
hold metfomin inpatient- cont LDSS and fingerstic monitoring  f/u A1C
Pt states he had some sort of radio/embolization procedure recently after which his appetite diminished  - we discussed possible appetite stimulants including megace and marinol/dronabinol- including risks such as VTE and behavioral changes; pt opting to forego pharmacologic appetite stimulation  - plans to eat meals on scheduled basis as though they are medications  - nutrition consult

## 2023-11-21 NOTE — PROVIDER CONTACT NOTE (OTHER) - BACKGROUND
Hypotension s/p stress test outpt likely 2/2 hypovolemia: received 1.5L fluids at outpatient cards office, hold diuretics for now. Improving w/ fluid resuscitation.
Pt admitted with hypotension s/p stress test
78 pavela britany male admitted for hypotension. PMH neuroendocrine carcinoma, HTN, hypercholesteremia, PAD, RAZIA, diabetes.
Pt admitted with dx. Hypotension s/p outpt stress test, RAZIA on CKD. PMH includes Aflutter s/p DCCV & CAD with PCI. Had 24 beats WCT 11/19, pending cath.
Admitted with hypotension s/p stress test
Pt admitted with dx. Hypotension s/p outpt stress test, RAZIA on CKD. PMH includes Aflutter s/p DCCV & CAD with PCI. WCT & PATs during this admission, pending poss cath.

## 2023-11-21 NOTE — PROVIDER CONTACT NOTE (OTHER) - ASSESSMENT
Patient asymptomatic
Pt A&Ox4, able to make needs known. Pt asymptomatic. VSS. No s/s of bleeding. Pt denies cp, denies SOB, denies pain.
Pt was asleep at time of the episode, denies CP/palpitation/SOB/pain, VSS.
Pt asymtptomatic. VSS: /71, HR 79, RR 18 SpO2 95%, temp 98
Patient asymptomatic of event
Pt was having BM at time of the episode, denies CP/SOB/pain/palpitation, VSS.

## 2023-11-21 NOTE — PROGRESS NOTE ADULT - SUBJECTIVE AND OBJECTIVE BOX
Andre Reyes, M.D.  Pager: [phone]  Office: [phone]    Patient is a 78y old  Male who presents with a chief complaint of hypotension (21 Nov 2023 12:04)          SUBJECTIVE / OVERNIGHT EVENTS:    No acute overnight events.    ROS: ( - ) Fever, ( - )Chills,  ( - )Nausea/Vomiting, ( - ) Cough, ( - )Shortness of breath, ( - )Chest Pain    MEDICATIONS  (STANDING):  allopurinol 100 milliGRAM(s) Oral daily  atorvastatin 80 milliGRAM(s) Oral at bedtime  chlorhexidine 2% Cloths 1 Application(s) Topical <User Schedule>  clopidogrel Tablet 75 milliGRAM(s) Oral daily  colchicine 0.6 milliGRAM(s) Oral every 24 hours  dextrose 5%. 1000 milliLiter(s) (100 mL/Hr) IV Continuous <Continuous>  dextrose 5%. 1000 milliLiter(s) (50 mL/Hr) IV Continuous <Continuous>  dextrose 50% Injectable 25 Gram(s) IV Push once  dextrose 50% Injectable 12.5 Gram(s) IV Push once  dextrose 50% Injectable 25 Gram(s) IV Push once  glucagon  Injectable 1 milliGRAM(s) IntraMuscular once  insulin lispro (ADMELOG) corrective regimen sliding scale   SubCutaneous Before meals and at bedtime  lidocaine   4% Patch 1 Patch Transdermal daily  metoprolol succinate ER 50 milliGRAM(s) Oral daily  pantoprazole    Tablet 40 milliGRAM(s) Oral before breakfast  sotalol. 80 milliGRAM(s) Oral every 12 hours    MEDICATIONS  (PRN):  acetaminophen     Tablet .. 650 milliGRAM(s) Oral every 6 hours PRN Temp greater or equal to 38C (100.4F), Mild Pain (1 - 3)  aluminum hydroxide/magnesium hydroxide/simethicone Suspension 30 milliLiter(s) Oral every 4 hours PRN Dyspepsia  dextrose Oral Gel 15 Gram(s) Oral once PRN Blood Glucose LESS THAN 70 milliGRAM(s)/deciliter  melatonin 3 milliGRAM(s) Oral at bedtime PRN Insomnia  ondansetron Injectable 4 milliGRAM(s) IV Push every 8 hours PRN Nausea and/or Vomiting          T(C): 36.6 (11-21 @ 10:51), Max: 36.9 (11-20 @ 20:09)   HR: 75   BP: 116/57   RR: 18   SpO2: 98%    PHYSICAL EXAM:    CONSTITUTIONAL: NAD, well-developed, well-groomed  EYES: PERRLA; conjunctiva and sclera clear  ENMT: Moist oral mucosa, no pharyngeal injection or exudates; normal dentition  NECK: Supple, no palpable masses; no thyromegaly  RESPIRATORY: Normal respiratory effort; lungs are clear to auscultation bilaterally  CARDIOVASCULAR: Regular rate and rhythm, normal S1 and S2, no murmur/rub/gallop; No lower extremity edema; Peripheral pulses are 2+ bilaterally  ABDOMEN: Nontender to palpation, normoactive bowel sounds, no rebound/guarding; No hepatosplenomegaly  MUSCULOSKELETAL:  no clubbing or cyanosis of digits; no joint swelling or tenderness to palpation  PSYCH: A+O to person, place, and time; affect appropriate  NEUROLOGY: CN 2-12 are intact and symmetric; no gross sensory deficits   SKIN: No rashes; no palpable lesions      LABS:                        10.3   6.58  )-----------( 243      ( 21 Nov 2023 06:30 )             33.1      11-21    138  |  105  |  22  ----------------------------<  145<H>  4.2   |  22  |  1.00    Ca    9.5      21 Nov 2023 06:30  Phos  2.9     11-21  Mg     1.8     11-21         CAPILLARY BLOOD GLUCOSE      POCT Blood Glucose.: 194 mg/dL (21 Nov 2023 11:29)  POCT Blood Glucose.: 149 mg/dL (21 Nov 2023 07:29)  POCT Blood Glucose.: 131 mg/dL (20 Nov 2023 21:15)  POCT Blood Glucose.: 120 mg/dL (20 Nov 2023 17:43)      RADIOLOGY & ADDITIONAL TESTS:    Imaging Personally Reviewed:  Consultant(s) Notes Reviewed:    Care Discussed with Consultants/Other Providers:   Andre Reyes, M.D.  Pager: [phone]  Office: [phone]    Patient is a 78y old  Male who presents with a chief complaint of hypotension (21 Nov 2023 12:04)          SUBJECTIVE / OVERNIGHT EVENTS:    No acute overnight events.  left ankle pain improving      ROS: ( - ) Fever, ( - )Chills,  ( - )Nausea/Vomiting, ( - ) Cough, ( - )Shortness of breath, ( - )Chest Pain    MEDICATIONS  (STANDING):  allopurinol 100 milliGRAM(s) Oral daily  atorvastatin 80 milliGRAM(s) Oral at bedtime  chlorhexidine 2% Cloths 1 Application(s) Topical <User Schedule>  clopidogrel Tablet 75 milliGRAM(s) Oral daily  colchicine 0.6 milliGRAM(s) Oral every 24 hours  dextrose 5%. 1000 milliLiter(s) (100 mL/Hr) IV Continuous <Continuous>  dextrose 5%. 1000 milliLiter(s) (50 mL/Hr) IV Continuous <Continuous>  dextrose 50% Injectable 25 Gram(s) IV Push once  dextrose 50% Injectable 12.5 Gram(s) IV Push once  dextrose 50% Injectable 25 Gram(s) IV Push once  glucagon  Injectable 1 milliGRAM(s) IntraMuscular once  insulin lispro (ADMELOG) corrective regimen sliding scale   SubCutaneous Before meals and at bedtime  lidocaine   4% Patch 1 Patch Transdermal daily  metoprolol succinate ER 50 milliGRAM(s) Oral daily  pantoprazole    Tablet 40 milliGRAM(s) Oral before breakfast  sotalol. 80 milliGRAM(s) Oral every 12 hours    MEDICATIONS  (PRN):  acetaminophen     Tablet .. 650 milliGRAM(s) Oral every 6 hours PRN Temp greater or equal to 38C (100.4F), Mild Pain (1 - 3)  aluminum hydroxide/magnesium hydroxide/simethicone Suspension 30 milliLiter(s) Oral every 4 hours PRN Dyspepsia  dextrose Oral Gel 15 Gram(s) Oral once PRN Blood Glucose LESS THAN 70 milliGRAM(s)/deciliter  melatonin 3 milliGRAM(s) Oral at bedtime PRN Insomnia  ondansetron Injectable 4 milliGRAM(s) IV Push every 8 hours PRN Nausea and/or Vomiting          T(C): 36.6 (11-21 @ 10:51), Max: 36.9 (11-20 @ 20:09)   HR: 75   BP: 116/57   RR: 18   SpO2: 98%    PHYSICAL EXAM:    CONSTITUTIONAL: NAD, well-developed, well-groomed  EYES: PERRLA; conjunctiva and sclera clear  ENMT: Moist oral mucosa, no pharyngeal injection or exudates; normal dentition  NECK: Supple, no palpable masses; no thyromegaly  RESPIRATORY: Normal respiratory effort; lungs are clear to auscultation bilaterally  CARDIOVASCULAR: Regular rate and rhythm, normal S1 and S2, no murmur/rub/gallop; No lower extremity edema; Peripheral pulses are 2+ bilaterally  ABDOMEN: Nontender to palpation, normoactive bowel sounds, no rebound/guarding; No hepatosplenomegaly  MUSCULOSKELETAL:  no clubbing or cyanosis of digits; no joint swelling or tenderness to palpation; left ankle tenderness improved, increased range of motion  PSYCH: A+O to person, place, and time; affect appropriate  NEUROLOGY: CN 2-12 are intact and symmetric; no gross sensory deficits   SKIN: No rashes; no palpable lesions      LABS:                        10.3   6.58  )-----------( 243      ( 21 Nov 2023 06:30 )             33.1      11-21    138  |  105  |  22  ----------------------------<  145<H>  4.2   |  22  |  1.00    Ca    9.5      21 Nov 2023 06:30  Phos  2.9     11-21  Mg     1.8     11-21         CAPILLARY BLOOD GLUCOSE      POCT Blood Glucose.: 194 mg/dL (21 Nov 2023 11:29)  POCT Blood Glucose.: 149 mg/dL (21 Nov 2023 07:29)  POCT Blood Glucose.: 131 mg/dL (20 Nov 2023 21:15)  POCT Blood Glucose.: 120 mg/dL (20 Nov 2023 17:43)      RADIOLOGY & ADDITIONAL TESTS:    Imaging Personally Reviewed:  Consultant(s) Notes Reviewed:    Care Discussed with Consultants/Other Providers:

## 2023-11-21 NOTE — PROGRESS NOTE ADULT - PROBLEM SELECTOR PLAN 2
s/p DCCV on eliquis  - reviewed ECG 11/17: NSR, QTc 457, no ST elevation, no ST depression.   - cont sotalol
RAZIA resolved  Creatinine Trend: 1.17<--, 0.92<--, 0.97<--, 1.10<--, 1.45<--, 1.38<--
s/p DCCV on eliquis  - reviewed ECG 11/17: NSR, QTc 457, no ST elevation, no ST depression.   - cont sotalol  - 24 beats wide complex tachycardia overnight--- EP consulted, f/u rec; increase metoprolol ER to 50 mg daily, replete Mg
RAZIA resolved  Creatinine Trend: 1.00<--, 1.17<--, 0.92<--, 0.97<--, 1.10<--, 1.45<--
s/p DCCV on eliquis  - reviewed ECG 11/17: NSR, QTc 457, no ST elevation, no ST depression.   - cont sotalol  - 10 beats wide complex tachycardia overnight, continue monitoring, f/u cards rec

## 2023-11-21 NOTE — PROGRESS NOTE ADULT - ASSESSMENT
New ECG(s): Personally reviewed    Echo:  10/31/23  1. Left ventricular systolic function is normal with an ejection fraction visually estimated at 60 to 65 %.   2. Normal right ventricular cavity size, wall thickness, and systolic function.   3. No evidence of left atrial or left atrial appendage thrombus. The left atrial appendage emptying velocity is low at 24 cm/s.   4. Minimal (grade 1) spontaneous echo contrast located in the left atrial apendage.   5. Mild mitral regurgitation. The mitral regurgitant jet is centrally directed. Mechanism of mitral regurgitation: The mechanism of mitral regurgitation: Gabbie Type I (normal leaflet motion with dilated annulus). Blunting of the pulmonary venous flow consistent with elevated left atrial pressure.   6. No pericardial effusion seen.   7. Compared to the transthoracic echocardiogram performed on 10/25/2023 there have been no significant interval changes.    Cath:  6/23/23  Coronary Angiography   The coronary circulation is right dominant.      LM   Left main artery: Angiography shows no disease.      LAD   Proximal left anterior descending: There is a 40 % stenosis. Distal  left anterior descending: There is a 90 % stenosis.    CX   Circumflex: Angiography shows no disease.      RCA   Distal right coronary artery: There is a 40 % stenosis. Right  posterior descending artery: There is a 60 % stenosis.    Interpretation of Telemetry: sinus 70s, last NSVT event on 11/19    77 y/o M with hx of CAD s/p NURIA to distal LAD in 6/2023, HTN, HLD, prior EtOH disorder (denies current use), T2DM, Scoliosis, spinal surgeries, Metastatic Pancreatic neuroendocrine tumor on Lutathera ([place] [facility] [place] with fatigue and chest pain. Recent admission for new onset AF s/p successful TRACY/DCCV on 10/31/23 with Sotalol started post DCCV. He now is admitted s/p reported hypotension and feelings of fatigue before and after OP stress test. EP consulted for MM NSVT up to 24 beats on telemetry.     1) history of pAF s/p TRACY DCCV, on Sotalol and Eliquis   2) History of CAD s/p NURIA   3) NSVT on tele   4) metastatic pancreatic neuroendocrine tumor     - Continue Sotalol 80mg bid, Metoprolol and Eliquis  - plan for cardiac cath today as per Cardiology. Patient with MM NSVT, unlikely to be ischemic   - Resume Eliquis when feasible   - Continue tele monitoring   - Replete lytes for K>4 and Mg>2     All recommendations pending attending attestation. We will continue to follow with you.     Jennifer Cantu MD  PGY-4, Cardiology  Available on TEAMS    For all new consults  [email or web address]  Login: [email or web address]

## 2023-11-21 NOTE — PROGRESS NOTE ADULT - PROBLEM SELECTOR PLAN 5
On torsemide which was recently stopped, as well as aldactone. Currently has trace to no edema B/L  - no HF hx- will hold diuretics for now  - pt reports recent LE duplex which was negative in the last month, low suspicion for DVT
h/o Metastatic Pancreatic Neuroendocrine Tumor  Patient follows with Dr. Sophia Prasad, [facility].  Recent progression of disease; last received Lutetium Edith-177 LUTATHERA on October 20th.  No systemic therapy while inpatient or during rehabilitation
On torsemide which was recently stopped, as well as aldactone. Currently has trace to no edema B/L  - no HF hx- will hold diuretics for now  - pt reports recent LE duplex which was negative in the last month, low suspicion for DVT
h/o Metastatic Pancreatic Neuroendocrine Tumor  Patient follows with Dr. Sophia Prasad, [facility].  Recent progression of disease; last received Lutetium Edith-177 LUTATHERA on October 20th.  No systemic therapy while inpatient or during rehabilitation
hold metfomin inpatient- cont LDSS and fingerstic monitoring  A1c 6.5

## 2023-11-21 NOTE — PROVIDER CONTACT NOTE (OTHER) - ACTION/TREATMENT ORDERED:
NP notified.
NP made aware.
Continue to monitor on telemetry
monitoring in progress.
provider notified, no interventions at this time, morning labs sent, care ongoing
IV magnesium to be administered

## 2023-11-21 NOTE — PROGRESS NOTE ADULT - PROBLEM SELECTOR PROBLEM 2
Atrial flutter
RAZIA (acute kidney injury)
RAZIA (acute kidney injury)
Atrial flutter
Atrial flutter

## 2023-11-21 NOTE — PROVIDER CONTACT NOTE (OTHER) - REASON
24 beats WCT
Pt had two events of PAT for 2.2 seconds and 4.4 seconds
pt had 7.1 sec PAT HR up to 140
Patient had 10 beats WCT
12 beats WCT
PAT x 3.6 sec with HR in 140s

## 2023-11-21 NOTE — PROVIDER CONTACT NOTE (OTHER) - SITUATION
PAT x 3.6 sec with HR in 140s
12 beats WCT
24 beats WCT
pt had 7.1 sec PAT HR up to 140
Patient had 10 beats WCT
Pt had two events of PAT for 2.2 seconds  and 4.4 seconds -133

## 2023-11-21 NOTE — PROGRESS NOTE ADULT - SUBJECTIVE AND OBJECTIVE BOX
DATE OF SERVICE: 11-21-23 @ 16:14    Patient is a 78y old  Male who presents with a chief complaint of hypotension (21 Nov 2023 12:12)      INTERVAL HISTORY: Feels ok.     REVIEW OF SYSTEMS:  CONSTITUTIONAL: No weakness  EYES/ENT: No visual changes;  No throat pain   NECK: No pain or stiffness  RESPIRATORY: No cough, wheezing; No shortness of breath  CARDIOVASCULAR: No chest pain or palpitations  GASTROINTESTINAL: No abdominal  pain. No nausea, vomiting, or hematemesis  GENITOURINARY: No dysuria, frequency or hematuria  NEUROLOGICAL: No stroke like symptoms  SKIN: No rashes    TELEMETRY Personally reviewed:  70-90 PACs, PAT 3.8 sec up to 150s, 3.6sec up to 140s  	  MEDICATIONS:  metoprolol succinate ER 50 milliGRAM(s) Oral daily  sotalol. 80 milliGRAM(s) Oral every 12 hours        PHYSICAL EXAM:  T(C): 36.7 (11-21-23 @ 15:49), Max: 36.9 (11-20-23 @ 20:09)  HR: 83 (11-21-23 @ 15:49) (75 - 83)  BP: 107/63 (11-21-23 @ 15:49) (107/63 - 122/68)  RR: 18 (11-21-23 @ 15:49) (18 - 18)  SpO2: 96% (11-21-23 @ 15:49) (95% - 98%)  Wt(kg): --  I&O's Summary    20 Nov 2023 07:01  -  21 Nov 2023 07:00  --------------------------------------------------------  IN: 960 mL / OUT: 1225 mL / NET: -265 mL    21 Nov 2023 07:01  -  21 Nov 2023 16:14  --------------------------------------------------------  IN: 480 mL / OUT: 850 mL / NET: -370 mL          Appearance: In no distress	  HEENT:    PERRL, EOMI	  Cardiovascular:  S1 S2, No JVD  Respiratory: Lungs clear to auscultation	  Gastrointestinal:  Soft, Non-tender, + BS	  Vascularature:  No edema of LE  Psychiatric: Appropriate affect   Neuro: no acute focal deficits                               10.3   6.58  )-----------( 243      ( 21 Nov 2023 06:30 )             33.1     11-21    138  |  105  |  22  ----------------------------<  145<H>  4.2   |  22  |  1.00    Ca    9.5      21 Nov 2023 06:30  Phos  2.9     11-21  Mg     1.8     11-21          Labs personally reviewed      ASSESSMENT/PLAN: 	    78-year-old male multiple medical problems history of hepatocellular carcinoma status post radiation therapy, AF s/p DCCV on Eliquis who was found to be hypotensive following NST. Patient has reported general weakness, fatigue, lightheadedness since being discharged from hospital. Reports mild chest discomfort in midsternal region. Reports dyspnea with minimal exertion. Also reports significant lack of appetite and poor PO intake. No dark or bloody stool, nausea or vomiting.       Problem/Plan - 1:  ·  Problem: Hypotension  - Occurred s/p NST (no exercise component conducted)--> NST normal study as per Dr. Hameed  - Trop 91 --> lower than previous admission   - BUN/Cr elevated  - BP now recovering with fluid resuscitation. Patient reports significantly poor appetite and decreased PO intake since hospital discharge.  - Hypotension likely 2/2 hypovolemia.  - Cultures NGTD  - rec adrenal insuffiencey work up      Problem/Plan - 2:  ·  Problem: Chest Pain  ·  Plan: Reported as mild intensity, midsternal, non-exertional and self resolves on its own.  - Trop 91 (similar to prior admission)  - Hx of recent PCI  - TTE from 10/25 shows preserved EF and no WMA  - NST 11/16 wnl  - CP unlikely cardiac in etiology.   - Patient now with significant ectopy (WCT, PAT, PVC, PACs). Plan for cardiac cath today vs tomorrow pending cardiac cath lab availability.     Problem/Plan - 3:  ·  Problem: Shortness of Breath  ·  Plan: ECG non-ischemic  - Prior TTE wnl.   - CXR shows clear lungs  - Patient was not discharged on his usual Torsemide following previous hospital admission d/t RAZIA.   - BNP 2442 (lower than previous DC)  - Maintain off Torsemide and consider lower dose as OP if need be     Problem/Plan - 4:  ·  Problem: CAD.   ·  Plan: Recent PCI to pLAD in June 2023  - ECG non-ischemic  - Trop as noted above  - c/w Plavix and statin  - Plan for cath as noted above     Problem/Plan - 5:  ·  Problem: RAZIA   ·  Plan: RAZIA possibly pre-renal d/t poor intake.   - Cont to hold spironolactone also i/s/o hyperkalemia  - Was restarted on Torsemide 40mg PO BID as OP -- discontinue  - Resolved     Problem/Plan - 6:  ·  Problem: Atrial Fibrillation  - s/p succesful DCCV 10/31  - Cont Eliquis 5mg BID  - C/w of Sotalol 80mg PO BID   - 11/19 24 beats of AT overnight  - EP consult appreciated: further plan pending ischemic eval       Sulma Espinosa, ROSIO-NP   Hank Hayes DO [facility]  Cardiovascular Medicine  [address] [place] [address], [address]  Available through call or text on [employer]  Office: [phone]   DATE OF SERVICE: 11-21-23 @ 16:14    Patient is a 78y old  Male who presents with a chief complaint of hypotension (21 Nov 2023 12:12)      INTERVAL HISTORY: Feels ok.     REVIEW OF SYSTEMS:  CONSTITUTIONAL: No weakness  EYES/ENT: No visual changes;  No throat pain   NECK: No pain or stiffness  RESPIRATORY: No cough, wheezing; No shortness of breath  CARDIOVASCULAR: No chest pain or palpitations  GASTROINTESTINAL: No abdominal  pain. No nausea, vomiting, or hematemesis  GENITOURINARY: No dysuria, frequency or hematuria  NEUROLOGICAL: No stroke like symptoms  SKIN: No rashes    TELEMETRY Personally reviewed:  70-90 PACs, PAT 3.8 sec up to 150s, 3.6sec up to 140s  	  MEDICATIONS:  metoprolol succinate ER 50 milliGRAM(s) Oral daily  sotalol. 80 milliGRAM(s) Oral every 12 hours        PHYSICAL EXAM:  T(C): 36.7 (11-21-23 @ 15:49), Max: 36.9 (11-20-23 @ 20:09)  HR: 83 (11-21-23 @ 15:49) (75 - 83)  BP: 107/63 (11-21-23 @ 15:49) (107/63 - 122/68)  RR: 18 (11-21-23 @ 15:49) (18 - 18)  SpO2: 96% (11-21-23 @ 15:49) (95% - 98%)  Wt(kg): --  I&O's Summary    20 Nov 2023 07:01  -  21 Nov 2023 07:00  --------------------------------------------------------  IN: 960 mL / OUT: 1225 mL / NET: -265 mL    21 Nov 2023 07:01  -  21 Nov 2023 16:14  --------------------------------------------------------  IN: 480 mL / OUT: 850 mL / NET: -370 mL          Appearance: In no distress	  HEENT:    PERRL, EOMI	  Cardiovascular:  S1 S2, No JVD  Respiratory: Lungs clear to auscultation	  Gastrointestinal:  Soft, Non-tender, + BS	  Vascularature:  No edema of LE  Psychiatric: Appropriate affect   Neuro: no acute focal deficits                               10.3   6.58  )-----------( 243      ( 21 Nov 2023 06:30 )             33.1     11-21    138  |  105  |  22  ----------------------------<  145<H>  4.2   |  22  |  1.00    Ca    9.5      21 Nov 2023 06:30  Phos  2.9     11-21  Mg     1.8     11-21          Labs personally reviewed      ASSESSMENT/PLAN: 	    78-year-old male multiple medical problems history of hepatocellular carcinoma status post radiation therapy, AF s/p DCCV on Eliquis who was found to be hypotensive following NST. Patient has reported general weakness, fatigue, lightheadedness since being discharged from hospital. Reports mild chest discomfort in midsternal region. Reports dyspnea with minimal exertion. Also reports significant lack of appetite and poor PO intake. No dark or bloody stool, nausea or vomiting.       Problem/Plan - 1:  ·  Problem: Hypotension  - Occurred s/p NST (no exercise component conducted)--> NST normal study as per Dr. Hameed  - Trop 91 --> lower than previous admission   - BUN/Cr elevated  - BP now recovering with fluid resuscitation. Patient reports significantly poor appetite and decreased PO intake since hospital discharge.  - Hypotension likely 2/2 hypovolemia.  - Cultures NGTD  - rec adrenal insuffiencey work up      Problem/Plan - 2:  ·  Problem: Chest Pain  ·  Plan: Reported as mild intensity, midsternal, non-exertional and self resolves on its own.  - Trop 91 (similar to prior admission)  - Hx of recent PCI  - TTE from 10/25 shows preserved EF and no WMA  - NST 11/16 wnl  - CP unlikely cardiac in etiology.   - Patient now with significant ectopy (WCT, PAT, PVC, PACs). Plan for cardiac cath today     Problem/Plan - 3:  ·  Problem: Shortness of Breath  ·  Plan: ECG non-ischemic  - Prior TTE wnl.   - CXR shows clear lungs  - Patient was not discharged on his usual Torsemide following previous hospital admission d/t RAZIA.   - BNP 2442 (lower than previous DC)  - Maintain off Torsemide and consider lower dose as OP if need be     Problem/Plan - 4:  ·  Problem: CAD.   ·  Plan: Recent PCI to pLAD in June 2023  - ECG non-ischemic  - Trop as noted above  - c/w Plavix and statin  - Plan for cath as noted above     Problem/Plan - 5:  ·  Problem: RAZIA   ·  Plan: RAZIA possibly pre-renal d/t poor intake.   - Cont to hold spironolactone also i/s/o hyperkalemia  - Was restarted on Torsemide 40mg PO BID as OP -- discontinue  - Resolved     Problem/Plan - 6:  ·  Problem: Atrial Fibrillation  - s/p succesful DCCV 10/31  - Cont Eliquis 5mg BID  - C/w of Sotalol 80mg PO BID   - 11/19 24 beats of AT overnight  - EP consult appreciated: further plan pending ischemic eval       Sulma Espinosa, ROSIO-NP   Hank Hayes DO [facility]  Cardiovascular Medicine  [address] [place] [address], [address]  Available through call or text on [employer]  Office: [phone]

## 2023-11-21 NOTE — PROGRESS NOTE ADULT - PROBLEM SELECTOR PLAN 1
Unclear what patient's pressures were vijay-stress test, but he reports feelings of fatigue even prior to the test. Required 1.5L fluids reportedly after the study. Stress test itself reportedly negative  - doubt arrythmogenic or vagally mediated  - history suggest hypovolemia as cause for presentation- likely in setting of worsening appetite and concurrent diuretic use  - check orthostatics-- negative   - hold diuretics for now  - per discussion with patient's PCP, considering patient's history of neuroendocrine tumor, will check AM cortisol level  - f/u BCx, UCx   - nutrition consult    #. Type II MI vs. demand ischemia given elevated troponin  - suspect stress/dehydration mediated- low suspicion for ACS given negative stress test vs. HF as pt has no prior history and last TTE 10/23 was WNL  - discuss with cardiology team Dr. Hayes, low suspicion of ACS   - reviewed ECG 11/17: NSR, QTc 457, no ST elevation, no ST depression.     # Probable CKD- fluctuates between Cr 1.1-1.4 as OP- CKD II/III. No sudhakar RAZIA at present  - cont monitoring, renally dose meds  - hold diuretics for now
Unclear what patient's pressures were vijay-stress test, but he reports feelings of fatigue even prior to the test. Required 1.5L fluids reportedly after the study. Stress test itself reportedly negative  - doubt arrythmogenic or vagally mediated  - history suggest hypovolemia as cause for presentation- likely in setting of worsening appetite and concurrent diuretic use  - check orthostatics-- negative   - hold diuretics for now  - per discussion with patient's PCP, considering patient's history of neuroendocrine tumor, will check AM cortisol level-- AM cortisol level 18.3 > 18, unlikely adrenal insufficiency   - f/u BCx prelim NGTD, UCx NG  - nutrition consult    #. Type II MI vs. demand ischemia given elevated troponin  - suspect stress/dehydration mediated- low suspicion for ACS given negative stress test vs. HF as pt has no prior history and last TTE 10/23 was WNL  - discuss with cardiology team Dr. Hayes, low suspicion of ACS   - reviewed ECG 11/17: NSR, QTc 457, no ST elevation, no ST depression.     # Probable CKD- fluctuates between Cr 1.1-1.4 as OP- CKD II/III. No sudhakar RAZIA at present  - cont monitoring, renally dose meds  - hold diuretics for now
Unclear what patient's pressures were vijay-stress test, but he reports feelings of fatigue even prior to the test. Required 1.5L fluids reportedly after the study. Stress test itself reportedly negative  - doubt arrythmogenic or vagally mediated  - history suggest hypovolemia as cause for presentation- likely in setting of worsening appetite and concurrent diuretic use  - check orthostatics-- negative   - hold diuretics for now  - AM cortisol level 18.3 > 18, unlikely adrenal insufficiency   - f/u BCx: NGTD, UCx: NGTD  - nutrition consult    #. Type II MI vs. demand ischemia given elevated troponin  - suspect stress/dehydration mediated- low suspicion for ACS given negative stress test vs. HF as pt has no prior history and last TTE 10/23 was WNL
Unclear what patient's pressures were vijay-stress test, but he reports feelings of fatigue even prior to the test. Required 1.5L fluids reportedly after the study. Stress test itself reportedly negative  - doubt arrythmogenic or vagally mediated  - history suggest hypovolemia as cause for presentation- likely in setting of worsening appetite and concurrent diuretic use  - check orthostatics-- negative   - hold diuretics for now  - per discussion with patient's PCP, considering patient's history of neuroendocrine tumor, will check AM cortisol level-- AM cortisol level 18.3 > 18, unlikely adrenal insufficiency   - f/u BCx prelim NGTD, UCx NG  - nutrition consult    #. Type II MI vs. demand ischemia given elevated troponin  - suspect stress/dehydration mediated- low suspicion for ACS given negative stress test vs. HF as pt has no prior history and last TTE 10/23 was WNL  - discuss with cardiology team Dr. Hayes, low suspicion of ACS   - reviewed ECG 11/17: NSR, QTc 457, no ST elevation, no ST depression.     # Probable CKD- fluctuates between Cr 1.1-1.4 as OP- CKD II/III. No sudhakar RAZIA at present  - cont monitoring, renally dose meds  - hold diuretics for now
Unclear what patient's pressures were vijay-stress test, but he reports feelings of fatigue even prior to the test. Required 1.5L fluids reportedly after the study. Stress test itself reportedly negative  - doubt arrythmogenic or vagally mediated  - history suggest hypovolemia as cause for presentation- likely in setting of worsening appetite and concurrent diuretic use  - check orthostatics-- negative   - hold diuretics for now  - AM cortisol level 18.3 > 18, unlikely adrenal insufficiency   - f/u BCx: NGTD, UCx: NGTD  - nutrition consult    #. Type II MI vs. demand ischemia given elevated troponin  - suspect stress/dehydration mediated- low suspicion for ACS given negative stress test vs. HF as pt has no prior history and last TTE 10/23 was WNL  - C planned for today

## 2023-11-22 ENCOUNTER — TRANSCRIPTION ENCOUNTER (OUTPATIENT)
Age: 78
End: 2023-11-22

## 2023-11-22 VITALS
OXYGEN SATURATION: 99 % | TEMPERATURE: 98 F | DIASTOLIC BLOOD PRESSURE: 69 MMHG | SYSTOLIC BLOOD PRESSURE: 110 MMHG | HEART RATE: 81 BPM | RESPIRATION RATE: 18 BRPM

## 2023-11-22 LAB
ANION GAP SERPL CALC-SCNC: 12 MMOL/L — SIGNIFICANT CHANGE UP (ref 5–17)
ANION GAP SERPL CALC-SCNC: 12 MMOL/L — SIGNIFICANT CHANGE UP (ref 5–17)
BUN SERPL-MCNC: 19 MG/DL — SIGNIFICANT CHANGE UP (ref 7–23)
BUN SERPL-MCNC: 19 MG/DL — SIGNIFICANT CHANGE UP (ref 7–23)
CALCIUM SERPL-MCNC: 8.6 MG/DL — SIGNIFICANT CHANGE UP (ref 8.4–10.5)
CALCIUM SERPL-MCNC: 8.6 MG/DL — SIGNIFICANT CHANGE UP (ref 8.4–10.5)
CHLORIDE SERPL-SCNC: 108 MMOL/L — SIGNIFICANT CHANGE UP (ref 96–108)
CHLORIDE SERPL-SCNC: 108 MMOL/L — SIGNIFICANT CHANGE UP (ref 96–108)
CO2 SERPL-SCNC: 21 MMOL/L — LOW (ref 22–31)
CO2 SERPL-SCNC: 21 MMOL/L — LOW (ref 22–31)
CREAT SERPL-MCNC: 0.79 MG/DL — SIGNIFICANT CHANGE UP (ref 0.5–1.3)
CREAT SERPL-MCNC: 0.79 MG/DL — SIGNIFICANT CHANGE UP (ref 0.5–1.3)
EGFR: 91 ML/MIN/1.73M2 — SIGNIFICANT CHANGE UP
EGFR: 91 ML/MIN/1.73M2 — SIGNIFICANT CHANGE UP
GLUCOSE BLDC GLUCOMTR-MCNC: 109 MG/DL — HIGH (ref 70–99)
GLUCOSE BLDC GLUCOMTR-MCNC: 109 MG/DL — HIGH (ref 70–99)
GLUCOSE BLDC GLUCOMTR-MCNC: 118 MG/DL — HIGH (ref 70–99)
GLUCOSE BLDC GLUCOMTR-MCNC: 118 MG/DL — HIGH (ref 70–99)
GLUCOSE SERPL-MCNC: 109 MG/DL — HIGH (ref 70–99)
GLUCOSE SERPL-MCNC: 109 MG/DL — HIGH (ref 70–99)
HCT VFR BLD CALC: 30 % — LOW (ref 39–50)
HCT VFR BLD CALC: 30 % — LOW (ref 39–50)
HGB BLD-MCNC: 9.8 G/DL — LOW (ref 13–17)
HGB BLD-MCNC: 9.8 G/DL — LOW (ref 13–17)
MAGNESIUM SERPL-MCNC: 1.9 MG/DL — SIGNIFICANT CHANGE UP (ref 1.6–2.6)
MAGNESIUM SERPL-MCNC: 1.9 MG/DL — SIGNIFICANT CHANGE UP (ref 1.6–2.6)
MCHC RBC-ENTMCNC: 30.2 PG — SIGNIFICANT CHANGE UP (ref 27–34)
MCHC RBC-ENTMCNC: 30.2 PG — SIGNIFICANT CHANGE UP (ref 27–34)
MCHC RBC-ENTMCNC: 32.7 GM/DL — SIGNIFICANT CHANGE UP (ref 32–36)
MCHC RBC-ENTMCNC: 32.7 GM/DL — SIGNIFICANT CHANGE UP (ref 32–36)
MCV RBC AUTO: 92.3 FL — SIGNIFICANT CHANGE UP (ref 80–100)
MCV RBC AUTO: 92.3 FL — SIGNIFICANT CHANGE UP (ref 80–100)
NRBC # BLD: 0 /100 WBCS — SIGNIFICANT CHANGE UP (ref 0–0)
NRBC # BLD: 0 /100 WBCS — SIGNIFICANT CHANGE UP (ref 0–0)
PHOSPHATE SERPL-MCNC: 2.4 MG/DL — LOW (ref 2.5–4.5)
PHOSPHATE SERPL-MCNC: 2.4 MG/DL — LOW (ref 2.5–4.5)
PLATELET # BLD AUTO: 223 K/UL — SIGNIFICANT CHANGE UP (ref 150–400)
PLATELET # BLD AUTO: 223 K/UL — SIGNIFICANT CHANGE UP (ref 150–400)
POTASSIUM SERPL-MCNC: 3.8 MMOL/L — SIGNIFICANT CHANGE UP (ref 3.5–5.3)
POTASSIUM SERPL-MCNC: 3.8 MMOL/L — SIGNIFICANT CHANGE UP (ref 3.5–5.3)
POTASSIUM SERPL-SCNC: 3.8 MMOL/L — SIGNIFICANT CHANGE UP (ref 3.5–5.3)
POTASSIUM SERPL-SCNC: 3.8 MMOL/L — SIGNIFICANT CHANGE UP (ref 3.5–5.3)
RBC # BLD: 3.25 M/UL — LOW (ref 4.2–5.8)
RBC # BLD: 3.25 M/UL — LOW (ref 4.2–5.8)
RBC # FLD: 15 % — HIGH (ref 10.3–14.5)
RBC # FLD: 15 % — HIGH (ref 10.3–14.5)
SODIUM SERPL-SCNC: 141 MMOL/L — SIGNIFICANT CHANGE UP (ref 135–145)
SODIUM SERPL-SCNC: 141 MMOL/L — SIGNIFICANT CHANGE UP (ref 135–145)
WBC # BLD: 7.04 K/UL — SIGNIFICANT CHANGE UP (ref 3.8–10.5)
WBC # BLD: 7.04 K/UL — SIGNIFICANT CHANGE UP (ref 3.8–10.5)
WBC # FLD AUTO: 7.04 K/UL — SIGNIFICANT CHANGE UP (ref 3.8–10.5)
WBC # FLD AUTO: 7.04 K/UL — SIGNIFICANT CHANGE UP (ref 3.8–10.5)

## 2023-11-22 PROCEDURE — 36415 COLL VENOUS BLD VENIPUNCTURE: CPT

## 2023-11-22 PROCEDURE — C9600: CPT | Mod: LD

## 2023-11-22 PROCEDURE — 82962 GLUCOSE BLOOD TEST: CPT

## 2023-11-22 PROCEDURE — 84550 ASSAY OF BLOOD/URIC ACID: CPT

## 2023-11-22 PROCEDURE — 84484 ASSAY OF TROPONIN QUANT: CPT

## 2023-11-22 PROCEDURE — 84132 ASSAY OF SERUM POTASSIUM: CPT

## 2023-11-22 PROCEDURE — C1887: CPT

## 2023-11-22 PROCEDURE — 99285 EMERGENCY DEPT VISIT HI MDM: CPT | Mod: 25

## 2023-11-22 PROCEDURE — 82330 ASSAY OF CALCIUM: CPT

## 2023-11-22 PROCEDURE — 93454 CORONARY ARTERY ANGIO S&I: CPT | Mod: 59

## 2023-11-22 PROCEDURE — C1874: CPT

## 2023-11-22 PROCEDURE — 85027 COMPLETE CBC AUTOMATED: CPT

## 2023-11-22 PROCEDURE — 73610 X-RAY EXAM OF ANKLE: CPT

## 2023-11-22 PROCEDURE — 85025 COMPLETE CBC W/AUTO DIFF WBC: CPT

## 2023-11-22 PROCEDURE — 86140 C-REACTIVE PROTEIN: CPT

## 2023-11-22 PROCEDURE — 83735 ASSAY OF MAGNESIUM: CPT

## 2023-11-22 PROCEDURE — 87641 MR-STAPH DNA AMP PROBE: CPT

## 2023-11-22 PROCEDURE — 83605 ASSAY OF LACTIC ACID: CPT

## 2023-11-22 PROCEDURE — C1769: CPT

## 2023-11-22 PROCEDURE — 85730 THROMBOPLASTIN TIME PARTIAL: CPT

## 2023-11-22 PROCEDURE — 87637 SARSCOV2&INF A&B&RSV AMP PRB: CPT

## 2023-11-22 PROCEDURE — 96374 THER/PROPH/DIAG INJ IV PUSH: CPT

## 2023-11-22 PROCEDURE — 83880 ASSAY OF NATRIURETIC PEPTIDE: CPT

## 2023-11-22 PROCEDURE — C1894: CPT

## 2023-11-22 PROCEDURE — 87086 URINE CULTURE/COLONY COUNT: CPT

## 2023-11-22 PROCEDURE — 99233 SBSQ HOSP IP/OBS HIGH 50: CPT

## 2023-11-22 PROCEDURE — 87640 STAPH A DNA AMP PROBE: CPT

## 2023-11-22 PROCEDURE — 85610 PROTHROMBIN TIME: CPT

## 2023-11-22 PROCEDURE — 97116 GAIT TRAINING THERAPY: CPT

## 2023-11-22 PROCEDURE — 93005 ELECTROCARDIOGRAM TRACING: CPT

## 2023-11-22 PROCEDURE — 97530 THERAPEUTIC ACTIVITIES: CPT

## 2023-11-22 PROCEDURE — 97161 PT EVAL LOW COMPLEX 20 MIN: CPT

## 2023-11-22 PROCEDURE — 82435 ASSAY OF BLOOD CHLORIDE: CPT

## 2023-11-22 PROCEDURE — 81003 URINALYSIS AUTO W/O SCOPE: CPT

## 2023-11-22 PROCEDURE — 82947 ASSAY GLUCOSE BLOOD QUANT: CPT

## 2023-11-22 PROCEDURE — 99239 HOSP IP/OBS DSCHRG MGMT >30: CPT

## 2023-11-22 PROCEDURE — 87040 BLOOD CULTURE FOR BACTERIA: CPT

## 2023-11-22 PROCEDURE — 83036 HEMOGLOBIN GLYCOSYLATED A1C: CPT

## 2023-11-22 PROCEDURE — 93308 TTE F-UP OR LMTD: CPT

## 2023-11-22 PROCEDURE — 82803 BLOOD GASES ANY COMBINATION: CPT

## 2023-11-22 PROCEDURE — 80053 COMPREHEN METABOLIC PANEL: CPT

## 2023-11-22 PROCEDURE — 84295 ASSAY OF SERUM SODIUM: CPT

## 2023-11-22 PROCEDURE — 84100 ASSAY OF PHOSPHORUS: CPT

## 2023-11-22 PROCEDURE — 85014 HEMATOCRIT: CPT

## 2023-11-22 PROCEDURE — 80048 BASIC METABOLIC PNL TOTAL CA: CPT

## 2023-11-22 PROCEDURE — 85018 HEMOGLOBIN: CPT

## 2023-11-22 PROCEDURE — 71045 X-RAY EXAM CHEST 1 VIEW: CPT

## 2023-11-22 PROCEDURE — 82533 TOTAL CORTISOL: CPT

## 2023-11-22 RX ORDER — ASPIRIN/CALCIUM CARB/MAGNESIUM 324 MG
1 TABLET ORAL
Qty: 0 | Refills: 0 | DISCHARGE
Start: 2023-11-22

## 2023-11-22 RX ORDER — SPIRONOLACTONE 25 MG/1
1 TABLET, FILM COATED ORAL
Refills: 0 | DISCHARGE

## 2023-11-22 RX ORDER — COLCHICINE 0.6 MG
1 TABLET ORAL
Qty: 7 | Refills: 0
Start: 2023-11-22 | End: 2023-11-28

## 2023-11-22 RX ORDER — APIXABAN 2.5 MG/1
5 TABLET, FILM COATED ORAL ONCE
Refills: 0 | Status: COMPLETED | OUTPATIENT
Start: 2023-11-22 | End: 2023-11-22

## 2023-11-22 RX ORDER — COLCHICINE 0.6 MG
1 TABLET ORAL
Qty: 0 | Refills: 0 | DISCHARGE
Start: 2023-11-22

## 2023-11-22 RX ORDER — METOPROLOL TARTRATE 50 MG
1 TABLET ORAL
Qty: 30 | Refills: 0
Start: 2023-11-22 | End: 2023-12-21

## 2023-11-22 RX ORDER — ROSUVASTATIN CALCIUM 5 MG/1
2 TABLET ORAL
Qty: 60 | Refills: 0
Start: 2023-11-22 | End: 2023-12-21

## 2023-11-22 RX ORDER — METOPROLOL TARTRATE 50 MG
1 TABLET ORAL
Qty: 0 | Refills: 0 | DISCHARGE
Start: 2023-11-22

## 2023-11-22 RX ADMIN — Medication 80 MILLIGRAM(S): at 05:52

## 2023-11-22 RX ADMIN — PANTOPRAZOLE SODIUM 40 MILLIGRAM(S): 20 TABLET, DELAYED RELEASE ORAL at 05:52

## 2023-11-22 RX ADMIN — APIXABAN 5 MILLIGRAM(S): 2.5 TABLET, FILM COATED ORAL at 14:14

## 2023-11-22 RX ADMIN — Medication 100 MILLIGRAM(S): at 11:18

## 2023-11-22 RX ADMIN — Medication 50 MILLIGRAM(S): at 05:52

## 2023-11-22 RX ADMIN — CLOPIDOGREL BISULFATE 75 MILLIGRAM(S): 75 TABLET, FILM COATED ORAL at 11:18

## 2023-11-22 RX ADMIN — Medication 81 MILLIGRAM(S): at 11:18

## 2023-11-22 RX ADMIN — CHLORHEXIDINE GLUCONATE 1 APPLICATION(S): 213 SOLUTION TOPICAL at 05:49

## 2023-11-22 NOTE — PROGRESS NOTE ADULT - SUBJECTIVE AND OBJECTIVE BOX
Patient is a 78y old  Male who presents with a chief complaint of hypotension (22 Nov 2023 10:11)    Patient seen and examined.  Patient resting comfortably, looking forward to discharge.    MEDICATIONS  (STANDING):  allopurinol 100 milliGRAM(s) Oral daily  aspirin  chewable 81 milliGRAM(s) Oral daily  atorvastatin 80 milliGRAM(s) Oral at bedtime  chlorhexidine 2% Cloths 1 Application(s) Topical <User Schedule>  clopidogrel Tablet 75 milliGRAM(s) Oral daily  colchicine 0.6 milliGRAM(s) Oral every 24 hours  dextrose 5%. 1000 milliLiter(s) (50 mL/Hr) IV Continuous <Continuous>  dextrose 5%. 1000 milliLiter(s) (100 mL/Hr) IV Continuous <Continuous>  dextrose 50% Injectable 25 Gram(s) IV Push once  dextrose 50% Injectable 12.5 Gram(s) IV Push once  dextrose 50% Injectable 25 Gram(s) IV Push once  glucagon  Injectable 1 milliGRAM(s) IntraMuscular once  insulin lispro (ADMELOG) corrective regimen sliding scale   SubCutaneous Before meals and at bedtime  lidocaine   4% Patch 1 Patch Transdermal daily  metoprolol succinate ER 50 milliGRAM(s) Oral daily  pantoprazole    Tablet 40 milliGRAM(s) Oral before breakfast  sodium chloride 0.9% Bolus 250 milliLiter(s) IV Bolus once  sodium chloride 0.9%. 1000 milliLiter(s) (75 mL/Hr) IV Continuous <Continuous>  sodium chloride 0.9%. 1000 milliLiter(s) (75 mL/Hr) IV Continuous <Continuous>  sotalol. 80 milliGRAM(s) Oral every 12 hours    MEDICATIONS  (PRN):  acetaminophen     Tablet .. 650 milliGRAM(s) Oral every 6 hours PRN Temp greater or equal to 38C (100.4F), Mild Pain (1 - 3)  aluminum hydroxide/magnesium hydroxide/simethicone Suspension 30 milliLiter(s) Oral every 4 hours PRN Dyspepsia  dextrose Oral Gel 15 Gram(s) Oral once PRN Blood Glucose LESS THAN 70 milliGRAM(s)/deciliter  melatonin 3 milliGRAM(s) Oral at bedtime PRN Insomnia  ondansetron Injectable 4 milliGRAM(s) IV Push every 8 hours PRN Nausea and/or Vomiting        Vital Signs Last 24 Hrs  T(C): 36.6 (22 Nov 2023 08:39), Max: 36.8 (21 Nov 2023 21:27)  T(F): 97.9 (22 Nov 2023 08:39), Max: 98.2 (21 Nov 2023 21:27)  HR: 69 (22 Nov 2023 08:39) (69 - 86)  BP: 108/67 (22 Nov 2023 08:39) (100/58 - 131/66)  BP(mean): --  RR: 18 (22 Nov 2023 08:39) (16 - 18)  SpO2: 98% (22 Nov 2023 08:39) (93% - 99%)    Parameters below as of 22 Nov 2023 08:39  Patient On (Oxygen Delivery Method): room air    PE  Awake, alert  Anicteric, MMM  RRR  CTAB  Abd soft, NT, ND  No c/c/e  No rash grossly  FROM                          9.8    7.04  )-----------( 223      ( 22 Nov 2023 07:33 )             30.0       11-22    141  |  108  |  19  ----------------------------<  109<H>  3.8   |  21<L>  |  0.79    Ca    8.6      22 Nov 2023 07:32  Phos  2.4     11-22  Mg     1.9     11-22

## 2023-11-22 NOTE — DISCHARGE NOTE NURSING/CASE MANAGEMENT/SOCIAL WORK - NSDCFUADDAPPT_GEN_ALL_CORE_FT
APPTS ARE READY TO BE MADE: [X] YES    Best Family or Patient Contact (if needed):    Additional Information about above appointments (if needed):    1: Follow-up with Dr. Hameed.  2: Follow-up with oncologist.   3:     Other comments or requests:   Patient was scheduled for an appointment on 11/24 12:15p at [facility] with Dr. Hameed. Patient/Caregiver was advised of appointment details.    Patient/Caregiver was provided with follow up request details and prefers to call the providers office on their own to schedule.

## 2023-11-22 NOTE — PROGRESS NOTE ADULT - SUBJECTIVE AND OBJECTIVE BOX
24H hour events: Patient is resting comfortably in bed without complaints post stent yesterday 11/21. Denies CP, palpitations, dizziness or SOB.     MEDICATIONS:  aspirin  chewable 81 milliGRAM(s) Oral daily  clopidogrel Tablet 75 milliGRAM(s) Oral daily  metoprolol succinate ER 50 milliGRAM(s) Oral daily  sotalol. 80 milliGRAM(s) Oral every 12 hours    acetaminophen     Tablet .. 650 milliGRAM(s) Oral every 6 hours PRN  melatonin 3 milliGRAM(s) Oral at bedtime PRN  ondansetron Injectable 4 milliGRAM(s) IV Push every 8 hours PRN    aluminum hydroxide/magnesium hydroxide/simethicone Suspension 30 milliLiter(s) Oral every 4 hours PRN  pantoprazole    Tablet 40 milliGRAM(s) Oral before breakfast    allopurinol 100 milliGRAM(s) Oral daily  atorvastatin 80 milliGRAM(s) Oral at bedtime  colchicine 0.6 milliGRAM(s) Oral every 24 hours  dextrose 50% Injectable 25 Gram(s) IV Push once  dextrose 50% Injectable 12.5 Gram(s) IV Push once  dextrose 50% Injectable 25 Gram(s) IV Push once  dextrose Oral Gel 15 Gram(s) Oral once PRN  glucagon  Injectable 1 milliGRAM(s) IntraMuscular once  insulin lispro (ADMELOG) corrective regimen sliding scale   SubCutaneous Before meals and at bedtime    chlorhexidine 2% Cloths 1 Application(s) Topical <User Schedule>  dextrose 5%. 1000 milliLiter(s) IV Continuous <Continuous>  dextrose 5%. 1000 milliLiter(s) IV Continuous <Continuous>  lidocaine   4% Patch 1 Patch Transdermal daily  sodium chloride 0.9% Bolus 250 milliLiter(s) IV Bolus once  sodium chloride 0.9%. 1000 milliLiter(s) IV Continuous <Continuous>  sodium chloride 0.9%. 1000 milliLiter(s) IV Continuous <Continuous>      REVIEW OF SYSTEMS:  Complete 12point ROS negative.    PHYSICAL EXAM:  T(C): 36.7 (11-22-23 @ 12:22), Max: 36.8 (11-21-23 @ 21:27)  HR: 69 (11-22-23 @ 12:22) (69 - 86)  BP: 103/65 (11-22-23 @ 12:22) (100/58 - 131/66)  RR: 18 (11-22-23 @ 12:22) (16 - 18)  SpO2: 97% (11-22-23 @ 12:22) (93% - 99%)    21 Nov 2023 07:01  -  22 Nov 2023 07:00  --------------------------------------------------------  IN: 480 mL / OUT: 1450 mL / NET: -970 mL    22 Nov 2023 07:01  -  22 Nov 2023 12:23  --------------------------------------------------------  IN: 400 mL / OUT: 0 mL / NET: 400 mL      Appearance: Normal	  HEENT:   Normal oral mucosa, PERRL, EOMI	  Cardiovascular: Normal S1 S2, regular. No JVD, No murmurs, No edema  Respiratory: Lungs clear to auscultation	  Psychiatry: A & O x 3, Mood & affect appropriate  Gastrointestinal:  Soft, Non-tender, + BS	  Skin: Right radial site open to air without bleeding or swelling.   Extremities: Normal range of motion, No clubbing, cyanosis or edema  Vascular: Peripheral pulses palpable 2+ bilaterally      LABS:	 	    CBC Full  -  ( 22 Nov 2023 07:33 )  WBC Count : 7.04 K/uL  Hemoglobin : 9.8 g/dL  Hematocrit : 30.0 %  Platelet Count - Automated : 223 K/uL  Mean Cell Volume : 92.3 fl  Mean Cell Hemoglobin : 30.2 pg  Mean Cell Hemoglobin Concentration : 32.7 gm/dL  Auto Neutrophil # : x  Auto Lymphocyte # : x  Auto Monocyte # : x  Auto Eosinophil # : x  Auto Basophil # : x  Auto Neutrophil % : x  Auto Lymphocyte % : x  Auto Monocyte % : x  Auto Eosinophil % : x  Auto Basophil % : x    11-22    141  |  108  |  19  ----------------------------<  109<H>  3.8   |  21<L>  |  0.79  11-21    138  |  105  |  22  ----------------------------<  145<H>  4.2   |  22  |  1.00    Ca    8.6      22 Nov 2023 07:32  Ca    9.5      21 Nov 2023 06:30  Phos  2.4     11-22  Phos  2.9     11-21  Mg     1.9     11-22  Mg     1.8     11-21      TELEMETRY: 	  NSR 60's- 90's        24H hour events: Patient is resting comfortably in bed without complaints post stent yesterday 11/21. Denies CP, palpitations, dizziness or SOB.     MEDICATIONS:  aspirin  chewable 81 milliGRAM(s) Oral daily  clopidogrel Tablet 75 milliGRAM(s) Oral daily  metoprolol succinate ER 50 milliGRAM(s) Oral daily  sotalol. 80 milliGRAM(s) Oral every 12 hours    acetaminophen     Tablet .. 650 milliGRAM(s) Oral every 6 hours PRN  melatonin 3 milliGRAM(s) Oral at bedtime PRN  ondansetron Injectable 4 milliGRAM(s) IV Push every 8 hours PRN    aluminum hydroxide/magnesium hydroxide/simethicone Suspension 30 milliLiter(s) Oral every 4 hours PRN  pantoprazole    Tablet 40 milliGRAM(s) Oral before breakfast    allopurinol 100 milliGRAM(s) Oral daily  atorvastatin 80 milliGRAM(s) Oral at bedtime  colchicine 0.6 milliGRAM(s) Oral every 24 hours  dextrose 50% Injectable 25 Gram(s) IV Push once  dextrose 50% Injectable 12.5 Gram(s) IV Push once  dextrose 50% Injectable 25 Gram(s) IV Push once  dextrose Oral Gel 15 Gram(s) Oral once PRN  glucagon  Injectable 1 milliGRAM(s) IntraMuscular once  insulin lispro (ADMELOG) corrective regimen sliding scale   SubCutaneous Before meals and at bedtime    chlorhexidine 2% Cloths 1 Application(s) Topical <User Schedule>  dextrose 5%. 1000 milliLiter(s) IV Continuous <Continuous>  dextrose 5%. 1000 milliLiter(s) IV Continuous <Continuous>  lidocaine   4% Patch 1 Patch Transdermal daily  sodium chloride 0.9% Bolus 250 milliLiter(s) IV Bolus once  sodium chloride 0.9%. 1000 milliLiter(s) IV Continuous <Continuous>  sodium chloride 0.9%. 1000 milliLiter(s) IV Continuous <Continuous>      REVIEW OF SYSTEMS:  Complete 12point ROS negative.    PHYSICAL EXAM:  T(C): 36.7 (11-22-23 @ 12:22), Max: 36.8 (11-21-23 @ 21:27)  HR: 69 (11-22-23 @ 12:22) (69 - 86)  BP: 103/65 (11-22-23 @ 12:22) (100/58 - 131/66)  RR: 18 (11-22-23 @ 12:22) (16 - 18)  SpO2: 97% (11-22-23 @ 12:22) (93% - 99%)    21 Nov 2023 07:01  -  22 Nov 2023 07:00  --------------------------------------------------------  IN: 480 mL / OUT: 1450 mL / NET: -970 mL    22 Nov 2023 07:01  -  22 Nov 2023 12:23  --------------------------------------------------------  IN: 400 mL / OUT: 0 mL / NET: 400 mL      Appearance: Normal	  HEENT:   Normal oral mucosa, PERRL, EOMI	  Cardiovascular: Normal S1 S2, regular. No JVD, No murmurs, No edema  Respiratory: Lungs clear to auscultation	  Psychiatry: A & O x 3, Mood & affect appropriate  Gastrointestinal:  Soft, Non-tender, + BS	  Skin: Right radial site open to air without bleeding or swelling.   Extremities: Normal range of motion, No clubbing, cyanosis or edema  Vascular: Peripheral pulses palpable 2+ bilaterally      LABS:	 	    CBC Full  -  ( 22 Nov 2023 07:33 )  WBC Count : 7.04 K/uL  Hemoglobin : 9.8 g/dL  Hematocrit : 30.0 %  Platelet Count - Automated : 223 K/uL  Mean Cell Volume : 92.3 fl  Mean Cell Hemoglobin : 30.2 pg  Mean Cell Hemoglobin Concentration : 32.7 gm/dL  Auto Neutrophil # : x  Auto Lymphocyte # : x  Auto Monocyte # : x  Auto Eosinophil # : x  Auto Basophil # : x  Auto Neutrophil % : x  Auto Lymphocyte % : x  Auto Monocyte % : x  Auto Eosinophil % : x  Auto Basophil % : x    11-22    141  |  108  |  19  ----------------------------<  109<H>  3.8   |  21<L>  |  0.79  11-21    138  |  105  |  22  ----------------------------<  145<H>  4.2   |  22  |  1.00    Ca    8.6      22 Nov 2023 07:32  Ca    9.5      21 Nov 2023 06:30  Phos  2.4     11-22  Phos  2.9     11-21  Mg     1.9     11-22  Mg     1.8     11-21      TELEMETRY: 	  NSR 60's- 90's. No further NSVT noted.

## 2023-11-22 NOTE — DISCHARGE NOTE NURSING/CASE MANAGEMENT/SOCIAL WORK - PATIENT PORTAL LINK FT
You can access the FollowMyHealth Patient Portal offered by [facility] by registering at the following website: [email or web address]. By joining [employer]’s FollowMyHealth portal, you will also be able to view your health information using other applications (apps) compatible with our system.

## 2023-11-22 NOTE — PROGRESS NOTE ADULT - REASON FOR ADMISSION
hypotension

## 2023-11-22 NOTE — PROGRESS NOTE ADULT - ASSESSMENT
NANDO HERNANDEZ is a 78y Male who presents with a chief complaint of hypotension.    Metastatic Pancreatic Neuroendocrine Tumor  ·	Patient follows with Dr. Sophia Prasad, [facility].  ·	Recent progression of disease; last received Lutetium Edith-177 LUTATHERA on October 20th.  ·	No systemic therapy while inpatient or during rehabilitation  ·	ongoing care after discharge    Anemia  ·	at baseline  ·	In the setting of inflammation, active malignancy  ·	Monitor and maintain HGB > 7    Hypotension  ·	cardiology consulted  ·	BP now recovering with fluid resuscitation. Patient reports significantly poor appetite and decreased PO intake since hospital discharge.  ·	Hypotension likely 2/2 hypovolemia.  ·	blood and urine cultures negative     Chest Pain  ·	now resolved  ·	Reported as mild intensity, midsternal, non-exertional and self resolves on its own.  ·	TTE from 10/25 shows preserved EF and no WMA  ·	EP consulted for WCTs noted on tele  ·	s/p Cath with drug eluting stents to left anterior descending artery on 11/21    will follow,    Christen Rosales NP  Hematology/ Oncology  [facility]  [phone] (office)  [phone] (alt office)  Evenings and weekends please call MD on call or office

## 2023-11-22 NOTE — PROGRESS NOTE ADULT - SUBJECTIVE AND OBJECTIVE BOX
24H hour events: No acute overnight events     MEDICATIONS:  aspirin  chewable 81 milliGRAM(s) Oral daily  clopidogrel Tablet 75 milliGRAM(s) Oral daily  metoprolol succinate ER 50 milliGRAM(s) Oral daily  sotalol. 80 milliGRAM(s) Oral every 12 hours        acetaminophen     Tablet .. 650 milliGRAM(s) Oral every 6 hours PRN  melatonin 3 milliGRAM(s) Oral at bedtime PRN  ondansetron Injectable 4 milliGRAM(s) IV Push every 8 hours PRN    aluminum hydroxide/magnesium hydroxide/simethicone Suspension 30 milliLiter(s) Oral every 4 hours PRN  pantoprazole    Tablet 40 milliGRAM(s) Oral before breakfast    allopurinol 100 milliGRAM(s) Oral daily  atorvastatin 80 milliGRAM(s) Oral at bedtime  colchicine 0.6 milliGRAM(s) Oral every 24 hours  dextrose 50% Injectable 25 Gram(s) IV Push once  dextrose 50% Injectable 12.5 Gram(s) IV Push once  dextrose 50% Injectable 25 Gram(s) IV Push once  dextrose Oral Gel 15 Gram(s) Oral once PRN  glucagon  Injectable 1 milliGRAM(s) IntraMuscular once  insulin lispro (ADMELOG) corrective regimen sliding scale   SubCutaneous Before meals and at bedtime    chlorhexidine 2% Cloths 1 Application(s) Topical <User Schedule>  dextrose 5%. 1000 milliLiter(s) IV Continuous <Continuous>  dextrose 5%. 1000 milliLiter(s) IV Continuous <Continuous>  lidocaine   4% Patch 1 Patch Transdermal daily  sodium chloride 0.9% Bolus 250 milliLiter(s) IV Bolus once  sodium chloride 0.9%. 1000 milliLiter(s) IV Continuous <Continuous>  sodium chloride 0.9%. 1000 milliLiter(s) IV Continuous <Continuous>      REVIEW OF SYSTEMS:  Denies chest pain/dyspnea    PHYSICAL EXAM:  T(C): 36.7 (11-22-23 @ 12:22), Max: 36.8 (11-21-23 @ 21:27)  HR: 69 (11-22-23 @ 12:22) (69 - 86)  BP: 103/65 (11-22-23 @ 12:22) (100/58 - 131/66)  RR: 18 (11-22-23 @ 12:22) (16 - 18)  SpO2: 97% (11-22-23 @ 12:22) (93% - 99%)  Wt(kg): --  I&O's Summary    21 Nov 2023 07:01  -  22 Nov 2023 07:00  --------------------------------------------------------  IN: 480 mL / OUT: 1450 mL / NET: -970 mL    22 Nov 2023 07:01  -  22 Nov 2023 14:19  --------------------------------------------------------  IN: 400 mL / OUT: 0 mL / NET: 400 mL        Appearance: Normal	  HEENT:   Normal oral mucosa, PERRL, EOMI	  Lymphatic: No lymphadenopathy  Cardiovascular: Normal S1 S2, No JVD, No murmurs, No edema  Respiratory: Lungs clear to auscultation	  Psychiatry: A & O x 3, Mood & affect appropriate  Gastrointestinal:  Soft, Non-tender, + BS	  Skin: No rashes, No ecchymoses, No cyanosis	  Neurologic: Non-focal  Extremities: Normal range of motion, No clubbing, cyanosis or edema  Vascular: Peripheral pulses palpable 2+ bilaterally        LABS:	 	    CBC Full  -  ( 22 Nov 2023 07:33 )  WBC Count : 7.04 K/uL  Hemoglobin : 9.8 g/dL  Hematocrit : 30.0 %  Platelet Count - Automated : 223 K/uL  Mean Cell Volume : 92.3 fl  Mean Cell Hemoglobin : 30.2 pg  Mean Cell Hemoglobin Concentration : 32.7 gm/dL  Auto Neutrophil # : x  Auto Lymphocyte # : x  Auto Monocyte # : x  Auto Eosinophil # : x  Auto Basophil # : x  Auto Neutrophil % : x  Auto Lymphocyte % : x  Auto Monocyte % : x  Auto Eosinophil % : x  Auto Basophil % : x    11-22    141  |  108  |  19  ----------------------------<  109<H>  3.8   |  21<L>  |  0.79  11-21    138  |  105  |  22  ----------------------------<  145<H>  4.2   |  22  |  1.00    Ca    8.6      22 Nov 2023 07:32  Ca    9.5      21 Nov 2023 06:30  Phos  2.4     11-22  Phos  2.9     11-21  Mg     1.9     11-22  Mg     1.8     11-21      TELEMETRY: SR with PAC's	      	  	  ASSESSMENT/PLAN: 	    78m hx HCC s/p recent ?embolization vs. radiotherapy, HTN, LE, aflutter s/p DCCV on eliquis, LE edema on diuretics presenting with reported hypotension and feelings of fatigue before and after OP stress test. Notes decreased appetite ever since his HCC treatment as food no longer has any taste. Denies any localizing infectious symptoms- no urinary/bowel symptoms, no localized aches/pains, no CP/SOB, no lightheadedness/presyncope, no falls/head trauma. States he received 1.5L IV fluids in Dr. Hameed's office, after which his fatigue abated.     In ED; give ceftriaxone and 700cc fluid bolus     11/21  s/p PCI NURIA x 1 LAD via RRA access.     CAD s/p PCI   -Triple therapy with A/P/Apixaban x 1 week then d/c ASA and continue Apixaban/Plavix.    - Eliquis may resume 11/22 in PM   - continue Atorvastatin 80mg  -f/u with o/p Cardiology within one week of discharge  -all other management per primary/cardiology team     Alyssa Burnett NP  [phone]

## 2023-11-22 NOTE — PROGRESS NOTE ADULT - SUBJECTIVE AND OBJECTIVE BOX
DATE OF SERVICE: 11-22-23 @ 09:02    Patient is a 78y old  Male who presents with a chief complaint of hypotension (21 Nov 2023 16:13)      INTERVAL HISTORY: Feels well.     REVIEW OF SYSTEMS:  CONSTITUTIONAL: No weakness  EYES/ENT: No visual changes;  No throat pain   NECK: No pain or stiffness  RESPIRATORY: No cough, wheezing; No shortness of breath  CARDIOVASCULAR: No chest pain or palpitations  GASTROINTESTINAL: No abdominal  pain. No nausea, vomiting, or hematemesis  GENITOURINARY: No dysuria, frequency or hematuria  NEUROLOGICAL: No stroke like symptoms  SKIN: No rashes    TELEMETRY Personally reviewed: SR 60-90, trigeminy, bigeminy, couplets, PAT 2 sec 140s  	  MEDICATIONS:  metoprolol succinate ER 50 milliGRAM(s) Oral daily  sotalol. 80 milliGRAM(s) Oral every 12 hours        PHYSICAL EXAM:  T(C): 36.6 (11-22-23 @ 08:39), Max: 36.8 (11-21-23 @ 21:27)  HR: 69 (11-22-23 @ 08:39) (69 - 86)  BP: 108/67 (11-22-23 @ 08:39) (100/58 - 131/66)  RR: 18 (11-22-23 @ 08:39) (16 - 18)  SpO2: 98% (11-22-23 @ 08:39) (93% - 99%)  Wt(kg): --  I&O's Summary    21 Nov 2023 07:01  -  22 Nov 2023 07:00  --------------------------------------------------------  IN: 480 mL / OUT: 1450 mL / NET: -970 mL          Appearance: In no distress	  HEENT:    PERRL, EOMI	  Cardiovascular:  S1 S2, No JVD  Respiratory: Lungs clear to auscultation	  Gastrointestinal:  Soft, Non-tender, + BS	  Vascularature:  No edema of LE  Psychiatric: Appropriate affect   Neuro: no acute focal deficits                               9.8    7.04  )-----------( 223      ( 22 Nov 2023 07:33 )             30.0     11-22    141  |  108  |  19  ----------------------------<  109<H>  3.8   |  21<L>  |  0.79    Ca    8.6      22 Nov 2023 07:32  Phos  2.4     11-22  Mg     1.9     11-22          Labs personally reviewed      ASSESSMENT/PLAN: 	      78-year-old male multiple medical problems history of hepatocellular carcinoma status post radiation therapy, AF s/p DCCV on Eliquis who was found to be hypotensive following NST. Patient has reported general weakness, fatigue, lightheadedness since being discharged from hospital. Reports mild chest discomfort in midsternal region. Reports dyspnea with minimal exertion. Also reports significant lack of appetite and poor PO intake. No dark or bloody stool, nausea or vomiting.       Problem/Plan - 1:  ·  Problem: Hypotension  - Occurred s/p NST (no exercise component conducted)--> NST normal study as per Dr. Hameed  - Trop 91 --> lower than previous admission   - BUN/Cr elevated  - BP now recovering with fluid resuscitation. Patient reports significantly poor appetite and decreased PO intake since hospital discharge.  - Hypotension likely 2/2 hypovolemia.  - Cultures NGTD     Problem/Plan - 2:  ·  Problem: Chest Pain  ·  Plan: Reported as mild intensity, midsternal, non-exertional and self resolves on its own.  - Trop 91 (similar to prior admission)  - Hx of recent PCI  - TTE from 10/25 shows preserved EF and no WMA  - NST 11/16 wnl  - s/p Cath with NURIA to pLAD.  - c/w triple therapy for 1 week (ASA, Plavix adn Eliquis) then DC ASA.      Problem/Plan - 3:  ·  Problem: Shortness of Breath  ·  Plan: ECG non-ischemic  - Prior TTE wnl.   - CXR shows clear lungs  - Patient was not discharged on his usual Torsemide following previous hospital admission d/t RAZIA.   - BNP 2442 (lower than previous DC)  - Maintain off Torsemide and consider lower dose as OP if need be     Problem/Plan - 4:  ·  Problem: CAD.   ·  Plan: Recent PCI to pLAD in June 2023  - ECG non-ischemic  - Trop as noted above  - c/w ASA, Plavix and statin  -s/p cath as noted above     Problem/Plan - 5:  ·  Problem: RAZIA   ·  Plan: RAZIA possibly pre-renal d/t poor intake.   - Cont to hold spironolactone also i/s/o hyperkalemia  - Was restarted on Torsemide 40mg PO BID as OP -- discontinue  - Resolved     Problem/Plan - 6:  ·  Problem: Atrial Fibrillation  - s/p succesful DCCV 10/31  - Cont Eliquis 5mg BID  - C/w of Sotalol 80mg PO BID   - 11/19 24 beats of AT overnight  - EP consult appreciated      Sulma Espinosa, AG-NP   Hank Hayes DO [facility]  Cardiovascular Medicine  [address] [place] [address], [address]  Available through call or text on [employer]  Office: [phone]

## 2023-11-22 NOTE — PROGRESS NOTE ADULT - SUBJECTIVE AND OBJECTIVE BOX
Andre Reyes, M.D.  Pager: [phone]  Office: [phone]    Patient is a 78y old  Male who presents with a chief complaint of hypotension (22 Nov 2023 09:02)          SUBJECTIVE / OVERNIGHT EVENTS:    No acute overnight events.    ROS: ( - ) Fever, ( - )Chills,  ( - )Nausea/Vomiting, ( - ) Cough, ( - )Shortness of breath, ( - )Chest Pain    MEDICATIONS  (STANDING):  allopurinol 100 milliGRAM(s) Oral daily  aspirin  chewable 81 milliGRAM(s) Oral daily  atorvastatin 80 milliGRAM(s) Oral at bedtime  chlorhexidine 2% Cloths 1 Application(s) Topical <User Schedule>  clopidogrel Tablet 75 milliGRAM(s) Oral daily  colchicine 0.6 milliGRAM(s) Oral every 24 hours  dextrose 5%. 1000 milliLiter(s) (50 mL/Hr) IV Continuous <Continuous>  dextrose 5%. 1000 milliLiter(s) (100 mL/Hr) IV Continuous <Continuous>  dextrose 50% Injectable 25 Gram(s) IV Push once  dextrose 50% Injectable 12.5 Gram(s) IV Push once  dextrose 50% Injectable 25 Gram(s) IV Push once  glucagon  Injectable 1 milliGRAM(s) IntraMuscular once  insulin lispro (ADMELOG) corrective regimen sliding scale   SubCutaneous Before meals and at bedtime  lidocaine   4% Patch 1 Patch Transdermal daily  metoprolol succinate ER 50 milliGRAM(s) Oral daily  pantoprazole    Tablet 40 milliGRAM(s) Oral before breakfast  sodium chloride 0.9% Bolus 250 milliLiter(s) IV Bolus once  sodium chloride 0.9%. 1000 milliLiter(s) (75 mL/Hr) IV Continuous <Continuous>  sodium chloride 0.9%. 1000 milliLiter(s) (75 mL/Hr) IV Continuous <Continuous>  sotalol. 80 milliGRAM(s) Oral every 12 hours    MEDICATIONS  (PRN):  acetaminophen     Tablet .. 650 milliGRAM(s) Oral every 6 hours PRN Temp greater or equal to 38C (100.4F), Mild Pain (1 - 3)  aluminum hydroxide/magnesium hydroxide/simethicone Suspension 30 milliLiter(s) Oral every 4 hours PRN Dyspepsia  dextrose Oral Gel 15 Gram(s) Oral once PRN Blood Glucose LESS THAN 70 milliGRAM(s)/deciliter  melatonin 3 milliGRAM(s) Oral at bedtime PRN Insomnia  ondansetron Injectable 4 milliGRAM(s) IV Push every 8 hours PRN Nausea and/or Vomiting          T(C): 36.6 (11-22 @ 08:39), Max: 36.8 (11-21 @ 21:27)   HR: 69   BP: 108/67   RR: 18   SpO2: 98%    PHYSICAL EXAM:    CONSTITUTIONAL: NAD, well-developed, well-groomed  EYES: PERRLA; conjunctiva and sclera clear  ENMT: Moist oral mucosa, no pharyngeal injection or exudates; normal dentition  NECK: Supple, no palpable masses; no thyromegaly  RESPIRATORY: Normal respiratory effort; lungs are clear to auscultation bilaterally  CARDIOVASCULAR: Regular rate and rhythm, normal S1 and S2, no murmur/rub/gallop; No lower extremity edema; Peripheral pulses are 2+ bilaterally  ABDOMEN: Nontender to palpation, normoactive bowel sounds, no rebound/guarding; No hepatosplenomegaly  MUSCULOSKELETAL:  no clubbing or cyanosis of digits; no joint swelling or tenderness to palpation  PSYCH: A+O to person, place, and time; affect appropriate  NEUROLOGY: CN 2-12 are intact and symmetric; no gross sensory deficits   SKIN: No rashes; no palpable lesions      LABS:                        9.8    7.04  )-----------( 223      ( 22 Nov 2023 07:33 )             30.0      11-22    141  |  108  |  19  ----------------------------<  109<H>  3.8   |  21<L>  |  0.79    Ca    8.6      22 Nov 2023 07:32  Phos  2.4     11-22  Mg     1.9     11-22         CAPILLARY BLOOD GLUCOSE      POCT Blood Glucose.: 118 mg/dL (22 Nov 2023 07:55)  POCT Blood Glucose.: 132 mg/dL (21 Nov 2023 21:10)  POCT Blood Glucose.: 121 mg/dL (21 Nov 2023 16:04)  POCT Blood Glucose.: 194 mg/dL (21 Nov 2023 11:29)      RADIOLOGY & ADDITIONAL TESTS:    Imaging Personally Reviewed:  Consultant(s) Notes Reviewed:    Care Discussed with Consultants/Other Providers:

## 2023-11-22 NOTE — PROGRESS NOTE ADULT - NS ATTEND AMEND GEN_ALL_CORE FT
Patient care and plan discussed and reviewed with Advanced Care Provider. Plan as outlined above edited by me to reflect our discussion.
Patient care and plan discussed and reviewed with Advanced Care Provider. Plan as outlined above edited by me to reflect our discussion.
As above. 79 y/o male with metastatic neuroendocrine tumor admitted with hypotension    - No systemic therapy inpatient or during rehabilitation  - Physical therapy/nutrition  - Ensure adequate volume status.   - Follow-up EP recommendations; possible catheterization
Patient care and plan discussed and reviewed with Advanced Care Provider. Plan as outlined above edited by me to reflect our discussion.
Patient care and plan discussed and reviewed with Advanced Care Provider. Plan as outlined above edited by me to reflect our discussion.
metastatic pancreatic NET, admitted for hypotension  suspected hypovolemia, improved w/IVF  CBC stable
Patient care and plan discussed and reviewed with Advanced Care Provider. Plan as outlined above edited by me to reflect our discussion.
metastatic pancreatic NET, admitted for hypotension  suspected hypovolemia, improved w/IVF  CBC stable  wide complex tachycardia O/N, EP consulted
patient with pancreatic NET Lastly on Lutetium Edith-[id number] LUTATHERA   Admitted now for chest pain and Hypotension  cardiology are following, pending cardiac cath  will follow
seen and agree
Patient care and plan discussed and reviewed with Advanced Care Provider. Plan as outlined above edited by me to reflect our discussion.
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 follow up with [facility] after discharge
seen and agree

## 2023-11-22 NOTE — PROGRESS NOTE ADULT - ASSESSMENT
79 y/o M with hx of CAD s/p NURIA to distal LAD in 6/2023, HTN, HLD, prior EtOH disorder (denies current use), T2DM, Scoliosis, spinal surgeries, Metastatic Pancreatic neuroendocrine tumor on Lutathera (first treatment on 10/20/23 at [facility] [place]), presenting with fatigue and chest pain. Recent admission for new onset AF s/p successful TRACY/DCCV on 10/31/23 with Sotalol started post DCCV. He now is admitted s/p reported hypotension and feelings of fatigue before and after OP stress test. EP consulted for MM NSVT up to 24 beats on telemetry.     1) history of pAF s/p TRACY DCCV, on Sotalol and Eliquis   2) History of CAD s/p NURIA   3) NSVT on tele last NSVT event on 11/19  4) metastatic pancreatic neuroendocrine tumor     - Continue Sotalol 80mg bid, Metoprolol and Eliquis  - plan for cardiac cath today as per Cardiology. Patient with MM NSVT, unlikely to be ischemic   - Resume Eliquis when feasible   - Continue tele monitoring   - Replete lytes for K>4 and Mg>2  77 y/o M with hx of CAD s/p NURIA to distal LAD in 6/2023, HTN, HLD, prior EtOH disorder (denies current use), T2DM, Scoliosis, spinal surgeries, Metastatic Pancreatic neuroendocrine tumor on Lutathera ([place] [facility] [place] with fatigue and chest pain. Recent admission for new onset AF s/p successful TRACY/DCCV on 10/31/23 with Sotalol started post DCCV. He now is admitted s/p reported hypotension and feelings of fatigue before and after OP stress test. EP consulted for MM NSVT up to 24 beats on telemetry.     1) history of pAF s/p TRACY DCCV, has been on Sotalol and Eliquis   2) History of CAD s/p NURIA 11/21   3) NSVT on tele, last NSVT event on 11/19  4) metastatic pancreatic neuroendocrine tumor     - Continue Sotalol 80mg bid, Metoprolol and Eliquis  - Resume Eliquis when feasible post Cath  - Cleared by EP for discharge planning home  - Replete lytes for K>4 and Mg>2   - Outpatient Cardiology follow up     HASEEB Jha [facility]  [phone]

## 2023-11-24 ENCOUNTER — APPOINTMENT (OUTPATIENT)
Dept: CARDIOLOGY | Facility: CLINIC | Age: 78
End: 2023-11-24
Payer: MEDICARE

## 2023-11-24 VITALS
HEIGHT: 74 IN | SYSTOLIC BLOOD PRESSURE: 90 MMHG | RESPIRATION RATE: 18 BRPM | TEMPERATURE: 97 F | BODY MASS INDEX: 31.53 KG/M2 | WEIGHT: 245.7 LBS | HEART RATE: 92 BPM | OXYGEN SATURATION: 99 % | DIASTOLIC BLOOD PRESSURE: 60 MMHG

## 2023-11-24 DIAGNOSIS — H61.23 IMPACTED CERUMEN, BILATERAL: ICD-10-CM

## 2023-11-24 PROCEDURE — 99214 OFFICE O/P EST MOD 30 MIN: CPT

## 2023-11-24 PROCEDURE — 93000 ELECTROCARDIOGRAM COMPLETE: CPT

## 2023-11-24 RX ORDER — ROSUVASTATIN CALCIUM 40 MG/1
40 TABLET, FILM COATED ORAL
Qty: 90 | Refills: 3 | Status: ACTIVE | COMMUNITY
Start: 1900-01-01 | End: 1900-01-01

## 2023-11-24 RX ORDER — SPIRONOLACTONE 25 MG/1
25 TABLET ORAL TWICE DAILY
Qty: 180 | Refills: 1 | Status: DISCONTINUED | COMMUNITY
Start: 2023-10-09 | End: 2023-11-24

## 2023-11-24 RX ORDER — TORSEMIDE 20 MG/1
20 TABLET ORAL
Qty: 360 | Refills: 1 | Status: DISCONTINUED | COMMUNITY
Start: 2020-09-29 | End: 2023-11-24

## 2023-11-25 ENCOUNTER — TRANSCRIPTION ENCOUNTER (OUTPATIENT)
Age: 78
End: 2023-11-25

## 2023-11-28 ENCOUNTER — APPOINTMENT (OUTPATIENT)
Dept: RHEUMATOLOGY | Facility: CLINIC | Age: 78
End: 2023-11-28

## 2023-11-29 ENCOUNTER — APPOINTMENT (OUTPATIENT)
Dept: CARDIOLOGY | Facility: CLINIC | Age: 78
End: 2023-11-29
Payer: MEDICARE

## 2023-11-29 DIAGNOSIS — R94.30 ABNORMAL RESULT OF CARDIOVASCULAR FUNCTION STUDY, UNSPECIFIED: ICD-10-CM

## 2023-11-29 PROCEDURE — 93306 TTE W/DOPPLER COMPLETE: CPT

## 2023-11-30 LAB
ALBUMIN SERPL ELPH-MCNC: 3.4 G/DL
ALP BLD-CCNC: 188 U/L
ALT SERPL-CCNC: 31 U/L
ANION GAP SERPL CALC-SCNC: 12 MMOL/L
AST SERPL-CCNC: 32 U/L
BASOPHILS # BLD AUTO: 0.04 K/UL
BASOPHILS NFR BLD AUTO: 0.7 %
BILIRUB SERPL-MCNC: 0.3 MG/DL
BUN SERPL-MCNC: 24 MG/DL
CALCIUM SERPL-MCNC: 10.1 MG/DL
CHLORIDE SERPL-SCNC: 108 MMOL/L
CO2 SERPL-SCNC: 21 MMOL/L
CREAT SERPL-MCNC: 0.94 MG/DL
EGFR: 83 ML/MIN/1.73M2
EOSINOPHIL # BLD AUTO: 0.22 K/UL
EOSINOPHIL NFR BLD AUTO: 3.7 %
GLUCOSE SERPL-MCNC: 95 MG/DL
HCT VFR BLD CALC: 34 %
HGB BLD-MCNC: 10.5 G/DL
IMM GRANULOCYTES NFR BLD AUTO: 0.7 %
LYMPHOCYTES # BLD AUTO: 1.24 K/UL
LYMPHOCYTES NFR BLD AUTO: 20.6 %
MAN DIFF?: NORMAL
MCHC RBC-ENTMCNC: 29.7 PG
MCHC RBC-ENTMCNC: 30.9 GM/DL
MCV RBC AUTO: 96.3 FL
MONOCYTES # BLD AUTO: 0.72 K/UL
MONOCYTES NFR BLD AUTO: 12 %
NEUTROPHILS # BLD AUTO: 3.75 K/UL
NEUTROPHILS NFR BLD AUTO: 62.3 %
NT-PROBNP SERPL-MCNC: 2087 PG/ML
PLATELET # BLD AUTO: 283 K/UL
POTASSIUM SERPL-SCNC: 4.5 MMOL/L
PROT SERPL-MCNC: 6.5 G/DL
RBC # BLD: 3.53 M/UL
RBC # FLD: 16.3 %
SODIUM SERPL-SCNC: 142 MMOL/L
WBC # FLD AUTO: 6.01 K/UL

## 2023-12-01 ENCOUNTER — APPOINTMENT (OUTPATIENT)
Dept: INTERNAL MEDICINE | Facility: CLINIC | Age: 78
End: 2023-12-01

## 2023-12-01 LAB
ALBUMIN SERPL ELPH-MCNC: 3.3 G/DL
ALP BLD-CCNC: 210 U/L
ALT SERPL-CCNC: 67 U/L
AMYLASE/CREAT SERPL: 45 U/L
ANION GAP SERPL CALC-SCNC: 15 MMOL/L
AST SERPL-CCNC: 56 U/L
BASOPHILS # BLD AUTO: 0.05 K/UL
BASOPHILS NFR BLD AUTO: 0.8 %
BILIRUB SERPL-MCNC: 0.2 MG/DL
BUN SERPL-MCNC: 35 MG/DL
CALCIUM SERPL-MCNC: 9.7 MG/DL
CDIFF BY PCR: NOT DETECTED
CHLORIDE SERPL-SCNC: 107 MMOL/L
CO2 SERPL-SCNC: 20 MMOL/L
CREAT SERPL-MCNC: 1.16 MG/DL
DEPRECATED O AND P PREP STL: NORMAL
EGFR: 64 ML/MIN/1.73M2
EOSINOPHIL # BLD AUTO: 0.19 K/UL
EOSINOPHIL NFR BLD AUTO: 2.9 %
FERRITIN SERPL-MCNC: 1061 NG/ML
FOLATE SERPL-MCNC: 7.9 NG/ML
GI PCR PANEL: NOT DETECTED
GLUCOSE SERPL-MCNC: 95 MG/DL
HCT VFR BLD CALC: 35 %
HGB BLD-MCNC: 10.6 G/DL
IMM GRANULOCYTES NFR BLD AUTO: 0.5 %
IRON SATN MFR SERPL: 31 %
IRON SERPL-MCNC: 57 UG/DL
LDH SERPL-CCNC: 171 U/L
LPL SERPL-CCNC: 50 U/L
LYMPHOCYTES # BLD AUTO: 0.87 K/UL
LYMPHOCYTES NFR BLD AUTO: 13.4 %
MAGNESIUM SERPL-MCNC: 1.6 MG/DL
MAN DIFF?: NORMAL
MCHC RBC-ENTMCNC: 29 PG
MCHC RBC-ENTMCNC: 30.3 GM/DL
MCV RBC AUTO: 95.6 FL
MONOCYTES # BLD AUTO: 0.75 K/UL
MONOCYTES NFR BLD AUTO: 11.5 %
NEUTROPHILS # BLD AUTO: 4.61 K/UL
NEUTROPHILS NFR BLD AUTO: 70.9 %
NT-PROBNP SERPL-MCNC: 2319 PG/ML
PLATELET # BLD AUTO: 302 K/UL
POTASSIUM SERPL-SCNC: 4.6 MMOL/L
PROT SERPL-MCNC: 6.9 G/DL
RBC # BLD: 3.66 M/UL
RBC # FLD: 15.4 %
SODIUM SERPL-SCNC: 142 MMOL/L
T4 FREE SERPL-MCNC: 1.4 NG/DL
TIBC SERPL-MCNC: 183 UG/DL
TRANSFERRIN SERPL-MCNC: 143 MG/DL
TSH SERPL-ACNC: 4.12 UIU/ML
UIBC SERPL-MCNC: 126 UG/DL
URATE SERPL-MCNC: 5.7 MG/DL
VIT B12 SERPL-MCNC: 591 PG/ML
WBC # FLD AUTO: 6.5 K/UL

## 2023-12-05 ENCOUNTER — APPOINTMENT (OUTPATIENT)
Dept: CARDIOLOGY | Facility: CLINIC | Age: 78
End: 2023-12-05
Payer: MEDICARE

## 2023-12-05 VITALS
HEART RATE: 75 BPM | OXYGEN SATURATION: 99 % | DIASTOLIC BLOOD PRESSURE: 60 MMHG | BODY MASS INDEX: 30.29 KG/M2 | WEIGHT: 236 LBS | TEMPERATURE: 98.7 F | HEIGHT: 74 IN | SYSTOLIC BLOOD PRESSURE: 90 MMHG

## 2023-12-05 PROCEDURE — 93000 ELECTROCARDIOGRAM COMPLETE: CPT

## 2023-12-05 PROCEDURE — 99214 OFFICE O/P EST MOD 30 MIN: CPT

## 2023-12-05 RX ORDER — ASPIRIN ENTERIC COATED TABLETS 81 MG 81 MG/1
81 TABLET, DELAYED RELEASE ORAL
Refills: 0 | Status: DISCONTINUED | COMMUNITY
Start: 2023-11-24 | End: 2023-12-05

## 2023-12-06 ENCOUNTER — NON-APPOINTMENT (OUTPATIENT)
Age: 78
End: 2023-12-06

## 2023-12-06 DIAGNOSIS — R74.8 ABNORMAL LEVELS OF OTHER SERUM ENZYMES: ICD-10-CM

## 2023-12-06 LAB
ALBUMIN SERPL ELPH-MCNC: 3.9 G/DL
ALP BLD-CCNC: 162 U/L
ALT SERPL-CCNC: 20 U/L
ANION GAP SERPL CALC-SCNC: 15 MMOL/L
AST SERPL-CCNC: 31 U/L
BASOPHILS # BLD AUTO: 0.04 K/UL
BASOPHILS NFR BLD AUTO: 0.6 %
BILIRUB DIRECT SERPL-MCNC: 0.2 MG/DL
BILIRUB INDIRECT SERPL-MCNC: 0.2 MG/DL
BILIRUB SERPL-MCNC: 0.4 MG/DL
BUN SERPL-MCNC: 28 MG/DL
CALCIUM SERPL-MCNC: 9.9 MG/DL
CHLORIDE SERPL-SCNC: 106 MMOL/L
CHOLEST SERPL-MCNC: 122 MG/DL
CK SERPL-CCNC: 50 U/L
CO2 SERPL-SCNC: 22 MMOL/L
CREAT SERPL-MCNC: 1.22 MG/DL
EGFR: 61 ML/MIN/1.73M2
EOSINOPHIL # BLD AUTO: 0.23 K/UL
EOSINOPHIL NFR BLD AUTO: 3.6 %
ESTIMATED AVERAGE GLUCOSE: 128 MG/DL
GLUCOSE SERPL-MCNC: 108 MG/DL
HBA1C MFR BLD HPLC: 6.1 %
HCT VFR BLD CALC: 35.5 %
HDLC SERPL-MCNC: 42 MG/DL
HGB BLD-MCNC: 11.1 G/DL
IMM GRANULOCYTES NFR BLD AUTO: 0.2 %
LDLC SERPL CALC-MCNC: 58 MG/DL
LDLC SERPL DIRECT ASSAY-MCNC: 59 MG/DL
LYMPHOCYTES # BLD AUTO: 1.24 K/UL
LYMPHOCYTES NFR BLD AUTO: 19.5 %
MAN DIFF?: NORMAL
MCHC RBC-ENTMCNC: 29.9 PG
MCHC RBC-ENTMCNC: 31.3 GM/DL
MCV RBC AUTO: 95.7 FL
MONOCYTES # BLD AUTO: 0.91 K/UL
MONOCYTES NFR BLD AUTO: 14.3 %
NEUTROPHILS # BLD AUTO: 3.94 K/UL
NEUTROPHILS NFR BLD AUTO: 61.8 %
NONHDLC SERPL-MCNC: 80 MG/DL
NT-PROBNP SERPL-MCNC: 1360 PG/ML
PLATELET # BLD AUTO: 240 K/UL
POTASSIUM SERPL-SCNC: 4.2 MMOL/L
PROT SERPL-MCNC: 6.7 G/DL
RBC # BLD: 3.71 M/UL
RBC # FLD: 17.1 %
SODIUM SERPL-SCNC: 143 MMOL/L
TRIGL SERPL-MCNC: 126 MG/DL
WBC # FLD AUTO: 6.37 K/UL

## 2023-12-12 ENCOUNTER — APPOINTMENT (OUTPATIENT)
Dept: INTERNAL MEDICINE | Facility: CLINIC | Age: 78
End: 2023-12-12

## 2023-12-17 ENCOUNTER — NON-APPOINTMENT (OUTPATIENT)
Age: 78
End: 2023-12-17

## 2023-12-19 ENCOUNTER — APPOINTMENT (OUTPATIENT)
Dept: CARDIOLOGY | Facility: CLINIC | Age: 78
End: 2023-12-19
Payer: MEDICARE

## 2023-12-19 VITALS
RESPIRATION RATE: 17 BRPM | BODY MASS INDEX: 30.03 KG/M2 | TEMPERATURE: 98 F | HEIGHT: 74 IN | OXYGEN SATURATION: 98 % | WEIGHT: 234 LBS | DIASTOLIC BLOOD PRESSURE: 69 MMHG | SYSTOLIC BLOOD PRESSURE: 95 MMHG | HEART RATE: 71 BPM

## 2023-12-19 PROCEDURE — 99214 OFFICE O/P EST MOD 30 MIN: CPT

## 2023-12-19 PROCEDURE — 93000 ELECTROCARDIOGRAM COMPLETE: CPT

## 2023-12-19 NOTE — PHYSICAL EXAM
[Well Developed] : well developed [Well Nourished] : well nourished [No Acute Distress] : no acute distress [Normal Conjunctiva] : normal conjunctiva [Normal Venous Pressure] : normal venous pressure [No Carotid Bruit] : no carotid bruit [Normal S1, S2] : normal S1, S2 [No Murmur] : no murmur [No Rub] : no rub [No Gallop] : no gallop [Clear Lung Fields] : clear lung fields [Good Air Entry] : good air entry [No Respiratory Distress] : no respiratory distress  [Soft] : abdomen soft [Non Tender] : non-tender [No Masses/organomegaly] : no masses/organomegaly [Normal Bowel Sounds] : normal bowel sounds [Normal Gait] : normal gait [No Edema] : no edema [No Cyanosis] : no cyanosis [No Clubbing] : no clubbing [No Varicosities] : no varicosities [No Rash] : no rash [No Skin Lesions] : no skin lesions [Moves all extremities] : moves all extremities [No Focal Deficits] : no focal deficits [Normal Speech] : normal speech [Alert and Oriented] : alert and oriented [Normal memory] : normal memory [de-identified] : left ankle pain on palpation

## 2023-12-19 NOTE — REVIEW OF SYSTEMS
[Negative] : Heme/Lymph [Dyspnea on exertion] : dyspnea during exertion [Dizziness] : dizziness [FreeTextEntry9] : left ankle pain  [de-identified] : mild excoriation  right leg

## 2023-12-19 NOTE — HISTORY OF PRESENT ILLNESS
[FreeTextEntry1] : This is a 78 year y/o male with a PMHx of Neuroendocrine Ca of liver, Dilated Aorta, PVD, HLD, HTN, DM, CAD presents today s/p cardiac cath 11/21/23 with Dr Raymundo with successful PCI with NURIA to proximal LAD. prior mid LAD stent patent. He is also s/p limited echo 11/29/23 with improved EF to 60-65%.   Patient still with shortness of breath with exertion and feels like leg gets weak. Patient still has b/l LE edema and pain in left foot. Patient does report of occasional lightheadedness when sitting.   The patient denies fever, chills, weight loss, malaise, rash, recent travel, insect bites, alteration bowel habits, headaches, weakness, abdominal pain, bloating, changes in urination, visual disturbances, shortness of breath, chest pain, dizziness, palpitations.

## 2023-12-28 ENCOUNTER — RX RENEWAL (OUTPATIENT)
Age: 78
End: 2023-12-28

## 2024-01-01 ENCOUNTER — INPATIENT (INPATIENT)
Facility: HOSPITAL | Age: 79
LOS: 7 days | Discharge: HOME | DRG: 313 | End: 2024-06-29
Attending: STUDENT IN AN ORGANIZED HEALTH CARE EDUCATION/TRAINING PROGRAM | Admitting: HOSPITALIST
Payer: MEDICARE

## 2024-01-01 ENCOUNTER — INPATIENT (INPATIENT)
Facility: HOSPITAL | Age: 79
LOS: 49 days | DRG: 684 | End: 2024-08-21
Attending: HOSPITALIST | Admitting: STUDENT IN AN ORGANIZED HEALTH CARE EDUCATION/TRAINING PROGRAM
Payer: MEDICARE

## 2024-01-01 VITALS
RESPIRATION RATE: 18 BRPM | DIASTOLIC BLOOD PRESSURE: 67 MMHG | OXYGEN SATURATION: 98 % | SYSTOLIC BLOOD PRESSURE: 111 MMHG | HEART RATE: 74 BPM | TEMPERATURE: 98 F

## 2024-01-01 VITALS
SYSTOLIC BLOOD PRESSURE: 75 MMHG | OXYGEN SATURATION: 98 % | TEMPERATURE: 99 F | DIASTOLIC BLOOD PRESSURE: 46 MMHG | HEART RATE: 60 BPM | RESPIRATION RATE: 18 BRPM

## 2024-01-01 VITALS
SYSTOLIC BLOOD PRESSURE: 102 MMHG | HEIGHT: 74 IN | HEART RATE: 88 BPM | TEMPERATURE: 97 F | RESPIRATION RATE: 16 BRPM | OXYGEN SATURATION: 99 % | DIASTOLIC BLOOD PRESSURE: 63 MMHG | WEIGHT: 225.97 LBS

## 2024-01-01 VITALS
OXYGEN SATURATION: 99 % | RESPIRATION RATE: 16 BRPM | TEMPERATURE: 99 F | DIASTOLIC BLOOD PRESSURE: 64 MMHG | SYSTOLIC BLOOD PRESSURE: 92 MMHG | HEART RATE: 79 BPM | HEIGHT: 74 IN

## 2024-01-01 DIAGNOSIS — E11.9 TYPE 2 DIABETES MELLITUS WITHOUT COMPLICATIONS: ICD-10-CM

## 2024-01-01 DIAGNOSIS — A41.9 SEPSIS, UNSPECIFIED ORGANISM: ICD-10-CM

## 2024-01-01 DIAGNOSIS — R06.09 OTHER FORMS OF DYSPNEA: ICD-10-CM

## 2024-01-01 DIAGNOSIS — E87.0 HYPEROSMOLALITY AND HYPERNATREMIA: ICD-10-CM

## 2024-01-01 DIAGNOSIS — J96.01 ACUTE RESPIRATORY FAILURE WITH HYPOXIA: ICD-10-CM

## 2024-01-01 DIAGNOSIS — N17.9 ACUTE KIDNEY FAILURE, UNSPECIFIED: ICD-10-CM

## 2024-01-01 DIAGNOSIS — I73.9 PERIPHERAL VASCULAR DISEASE, UNSPECIFIED: ICD-10-CM

## 2024-01-01 DIAGNOSIS — I48.20 CHRONIC ATRIAL FIBRILLATION, UNSPECIFIED: ICD-10-CM

## 2024-01-01 DIAGNOSIS — R74.01 ELEVATION OF LEVELS OF LIVER TRANSAMINASE LEVELS: ICD-10-CM

## 2024-01-01 DIAGNOSIS — R19.7 DIARRHEA, UNSPECIFIED: ICD-10-CM

## 2024-01-01 DIAGNOSIS — I95.9 HYPOTENSION, UNSPECIFIED: ICD-10-CM

## 2024-01-01 DIAGNOSIS — Z92.89 PERSONAL HISTORY OF OTHER MEDICAL TREATMENT: Chronic | ICD-10-CM

## 2024-01-01 DIAGNOSIS — D64.9 ANEMIA, UNSPECIFIED: ICD-10-CM

## 2024-01-01 DIAGNOSIS — R05.9 COUGH, UNSPECIFIED: ICD-10-CM

## 2024-01-01 DIAGNOSIS — I25.10 ATHEROSCLEROTIC HEART DISEASE OF NATIVE CORONARY ARTERY WITHOUT ANGINA PECTORIS: ICD-10-CM

## 2024-01-01 DIAGNOSIS — R60.0 LOCALIZED EDEMA: ICD-10-CM

## 2024-01-01 DIAGNOSIS — K92.2 GASTROINTESTINAL HEMORRHAGE, UNSPECIFIED: ICD-10-CM

## 2024-01-01 DIAGNOSIS — Z71.89 OTHER SPECIFIED COUNSELING: ICD-10-CM

## 2024-01-01 DIAGNOSIS — I31.9 DISEASE OF PERICARDIUM, UNSPECIFIED: ICD-10-CM

## 2024-01-01 DIAGNOSIS — Z96.60 PRESENCE OF UNSPECIFIED ORTHOPEDIC JOINT IMPLANT: Chronic | ICD-10-CM

## 2024-01-01 DIAGNOSIS — Z51.5 ENCOUNTER FOR PALLIATIVE CARE: ICD-10-CM

## 2024-01-01 DIAGNOSIS — Z98.890 OTHER SPECIFIED POSTPROCEDURAL STATES: Chronic | ICD-10-CM

## 2024-01-01 DIAGNOSIS — T17.908A UNSPECIFIED FOREIGN BODY IN RESPIRATORY TRACT, PART UNSPECIFIED CAUSING OTHER INJURY, INITIAL ENCOUNTER: ICD-10-CM

## 2024-01-01 DIAGNOSIS — I47.29 OTHER VENTRICULAR TACHYCARDIA: ICD-10-CM

## 2024-01-01 DIAGNOSIS — M10.9 GOUT, UNSPECIFIED: ICD-10-CM

## 2024-01-01 DIAGNOSIS — Z96.653 PRESENCE OF ARTIFICIAL KNEE JOINT, BILATERAL: Chronic | ICD-10-CM

## 2024-01-01 DIAGNOSIS — I10 ESSENTIAL (PRIMARY) HYPERTENSION: ICD-10-CM

## 2024-01-01 DIAGNOSIS — Z29.9 ENCOUNTER FOR PROPHYLACTIC MEASURES, UNSPECIFIED: ICD-10-CM

## 2024-01-01 DIAGNOSIS — C7A.8 OTHER MALIGNANT NEUROENDOCRINE TUMORS: ICD-10-CM

## 2024-01-01 DIAGNOSIS — D3A.8 OTHER BENIGN NEUROENDOCRINE TUMORS: ICD-10-CM

## 2024-01-01 DIAGNOSIS — R07.9 CHEST PAIN, UNSPECIFIED: ICD-10-CM

## 2024-01-01 DIAGNOSIS — J38.7 OTHER DISEASES OF LARYNX: ICD-10-CM

## 2024-01-01 DIAGNOSIS — R53.2 FUNCTIONAL QUADRIPLEGIA: ICD-10-CM

## 2024-01-01 DIAGNOSIS — K92.1 MELENA: ICD-10-CM

## 2024-01-01 DIAGNOSIS — J93.9 PNEUMOTHORAX, UNSPECIFIED: ICD-10-CM

## 2024-01-01 DIAGNOSIS — R62.7 ADULT FAILURE TO THRIVE: ICD-10-CM

## 2024-01-01 DIAGNOSIS — A04.72 ENTEROCOLITIS DUE TO CLOSTRIDIUM DIFFICILE, NOT SPECIFIED AS RECURRENT: ICD-10-CM

## 2024-01-01 DIAGNOSIS — R57.8 OTHER SHOCK: ICD-10-CM

## 2024-01-01 LAB
-  AMOXICILLIN/CLAVULANIC ACID: SIGNIFICANT CHANGE UP
-  AMOXICILLIN/CLAVULANIC ACID: SIGNIFICANT CHANGE UP
-  AMPICILLIN/SULBACTAM: SIGNIFICANT CHANGE UP
-  AMPICILLIN/SULBACTAM: SIGNIFICANT CHANGE UP
-  AMPICILLIN: SIGNIFICANT CHANGE UP
-  AMPICILLIN: SIGNIFICANT CHANGE UP
-  AZTREONAM: SIGNIFICANT CHANGE UP
-  CEFAZOLIN: SIGNIFICANT CHANGE UP
-  CEFAZOLIN: SIGNIFICANT CHANGE UP
-  CEFEPIME: SIGNIFICANT CHANGE UP
-  CEFOXITIN: SIGNIFICANT CHANGE UP
-  CEFOXITIN: SIGNIFICANT CHANGE UP
-  CEFTAZIDIME/AVIBACTAM: SIGNIFICANT CHANGE UP
-  CEFTAZIDIME/AVIBACTAM: SIGNIFICANT CHANGE UP
-  CEFTAZIDIME: SIGNIFICANT CHANGE UP
-  CEFTAZIDIME: SIGNIFICANT CHANGE UP
-  CEFTOLOZANE/TAZOBACTAM: SIGNIFICANT CHANGE UP
-  CEFTOLOZANE/TAZOBACTAM: SIGNIFICANT CHANGE UP
-  CEFTRIAXONE: SIGNIFICANT CHANGE UP
-  CEFTRIAXONE: SIGNIFICANT CHANGE UP
-  CIPROFLOXACIN: SIGNIFICANT CHANGE UP
-  ERTAPENEM: SIGNIFICANT CHANGE UP
-  ERTAPENEM: SIGNIFICANT CHANGE UP
-  GENTAMICIN: SIGNIFICANT CHANGE UP
-  GENTAMICIN: SIGNIFICANT CHANGE UP
-  IMIPENEM: SIGNIFICANT CHANGE UP
-  LEVOFLOXACIN: SIGNIFICANT CHANGE UP
-  MEROPENEM: SIGNIFICANT CHANGE UP
-  NITROFURANTOIN: SIGNIFICANT CHANGE UP
-  PIPERACILLIN/TAZOBACTAM: SIGNIFICANT CHANGE UP
-  TOBRAMYCIN: SIGNIFICANT CHANGE UP
-  TOBRAMYCIN: SIGNIFICANT CHANGE UP
-  TRIMETHOPRIM/SULFAMETHOXAZOLE: SIGNIFICANT CHANGE UP
-  TRIMETHOPRIM/SULFAMETHOXAZOLE: SIGNIFICANT CHANGE UP
A1C WITH ESTIMATED AVERAGE GLUCOSE RESULT: 6.4 % — HIGH (ref 4–5.6)
ACTH SER-ACNC: 6.8 PG/ML — LOW (ref 7.2–63.3)
ADD ON TEST-SPECIMEN IN LAB: SIGNIFICANT CHANGE UP
ALBUMIN SERPL ELPH-MCNC: 1.9 G/DL — LOW (ref 3.3–5)
ALBUMIN SERPL ELPH-MCNC: 2 G/DL — LOW (ref 3.3–5)
ALBUMIN SERPL ELPH-MCNC: 2.1 G/DL — LOW (ref 3.3–5)
ALBUMIN SERPL ELPH-MCNC: 2.2 G/DL — LOW (ref 3.3–5)
ALBUMIN SERPL ELPH-MCNC: 2.3 G/DL — LOW (ref 3.3–5)
ALBUMIN SERPL ELPH-MCNC: 2.4 G/DL — LOW (ref 3.3–5)
ALBUMIN SERPL ELPH-MCNC: 2.5 G/DL — LOW (ref 3.3–5)
ALBUMIN SERPL ELPH-MCNC: 2.6 G/DL — LOW (ref 3.3–5)
ALBUMIN SERPL ELPH-MCNC: 2.8 G/DL — LOW (ref 3.3–5)
ALBUMIN SERPL ELPH-MCNC: 3.1 G/DL — LOW (ref 3.3–5)
ALBUMIN SERPL ELPH-MCNC: 3.2 G/DL — LOW (ref 3.3–5)
ALBUMIN SERPL ELPH-MCNC: 3.6 G/DL — SIGNIFICANT CHANGE UP (ref 3.3–5)
ALP SERPL-CCNC: 102 U/L — SIGNIFICANT CHANGE UP (ref 40–120)
ALP SERPL-CCNC: 113 U/L — SIGNIFICANT CHANGE UP (ref 40–120)
ALP SERPL-CCNC: 117 U/L — SIGNIFICANT CHANGE UP (ref 40–120)
ALP SERPL-CCNC: 138 U/L — HIGH (ref 40–120)
ALP SERPL-CCNC: 144 U/L — HIGH (ref 40–120)
ALP SERPL-CCNC: 156 U/L — HIGH (ref 40–120)
ALP SERPL-CCNC: 171 U/L — HIGH (ref 40–120)
ALP SERPL-CCNC: 174 U/L — HIGH (ref 40–120)
ALP SERPL-CCNC: 175 U/L — HIGH (ref 40–120)
ALP SERPL-CCNC: 177 U/L — HIGH (ref 40–120)
ALP SERPL-CCNC: 179 U/L — HIGH (ref 40–120)
ALP SERPL-CCNC: 184 U/L — HIGH (ref 40–120)
ALP SERPL-CCNC: 185 U/L — HIGH (ref 40–120)
ALP SERPL-CCNC: 189 U/L — HIGH (ref 40–120)
ALP SERPL-CCNC: 191 U/L — HIGH (ref 40–120)
ALP SERPL-CCNC: 192 U/L — HIGH (ref 40–120)
ALP SERPL-CCNC: 197 U/L — HIGH (ref 40–120)
ALP SERPL-CCNC: 197 U/L — HIGH (ref 40–120)
ALP SERPL-CCNC: 201 U/L — HIGH (ref 40–120)
ALP SERPL-CCNC: 203 U/L — HIGH (ref 40–120)
ALP SERPL-CCNC: 204 U/L — HIGH (ref 40–120)
ALP SERPL-CCNC: 204 U/L — HIGH (ref 40–120)
ALP SERPL-CCNC: 205 U/L — HIGH (ref 40–120)
ALP SERPL-CCNC: 205 U/L — HIGH (ref 40–120)
ALP SERPL-CCNC: 207 U/L — HIGH (ref 40–120)
ALP SERPL-CCNC: 209 U/L — HIGH (ref 40–120)
ALP SERPL-CCNC: 211 U/L — HIGH (ref 40–120)
ALP SERPL-CCNC: 211 U/L — HIGH (ref 40–120)
ALP SERPL-CCNC: 213 U/L — HIGH (ref 40–120)
ALP SERPL-CCNC: 216 U/L — HIGH (ref 40–120)
ALP SERPL-CCNC: 219 U/L — HIGH (ref 40–120)
ALP SERPL-CCNC: 220 U/L — HIGH (ref 40–120)
ALP SERPL-CCNC: 222 U/L — HIGH (ref 40–120)
ALP SERPL-CCNC: 222 U/L — HIGH (ref 40–120)
ALP SERPL-CCNC: 225 U/L — HIGH (ref 40–120)
ALP SERPL-CCNC: 227 U/L — HIGH (ref 40–120)
ALP SERPL-CCNC: 227 U/L — HIGH (ref 40–120)
ALP SERPL-CCNC: 229 U/L — HIGH (ref 40–120)
ALP SERPL-CCNC: 231 U/L — HIGH (ref 40–120)
ALP SERPL-CCNC: 240 U/L — HIGH (ref 40–120)
ALP SERPL-CCNC: 241 U/L — HIGH (ref 40–120)
ALP SERPL-CCNC: 248 U/L — HIGH (ref 40–120)
ALP SERPL-CCNC: 259 U/L — HIGH (ref 40–120)
ALP SERPL-CCNC: 260 U/L — HIGH (ref 40–120)
ALP SERPL-CCNC: 266 U/L — HIGH (ref 40–120)
ALP SERPL-CCNC: 269 U/L — HIGH (ref 40–120)
ALP SERPL-CCNC: 270 U/L — HIGH (ref 40–120)
ALP SERPL-CCNC: 284 U/L — HIGH (ref 40–120)
ALP SERPL-CCNC: 307 U/L — HIGH (ref 40–120)
ALP SERPL-CCNC: 309 U/L — HIGH (ref 40–120)
ALP SERPL-CCNC: 359 U/L — HIGH (ref 40–120)
ALP SERPL-CCNC: 383 U/L — HIGH (ref 40–120)
ALP SERPL-CCNC: 440 U/L — HIGH (ref 40–120)
ALP SERPL-CCNC: 484 U/L — HIGH (ref 40–120)
ALT FLD-CCNC: 1093 U/L — HIGH (ref 10–45)
ALT FLD-CCNC: 116 U/L — HIGH (ref 10–45)
ALT FLD-CCNC: 117 U/L — HIGH (ref 10–45)
ALT FLD-CCNC: 125 U/L — HIGH (ref 10–45)
ALT FLD-CCNC: 1311 U/L — HIGH (ref 10–45)
ALT FLD-CCNC: 133 U/L — HIGH (ref 10–45)
ALT FLD-CCNC: 138 U/L — HIGH (ref 10–45)
ALT FLD-CCNC: 1405 U/L — HIGH (ref 10–45)
ALT FLD-CCNC: 15 U/L — SIGNIFICANT CHANGE UP (ref 10–45)
ALT FLD-CCNC: 155 U/L — HIGH (ref 10–45)
ALT FLD-CCNC: 16 U/L — SIGNIFICANT CHANGE UP (ref 10–45)
ALT FLD-CCNC: 16 U/L — SIGNIFICANT CHANGE UP (ref 10–45)
ALT FLD-CCNC: 17 U/L — SIGNIFICANT CHANGE UP (ref 10–45)
ALT FLD-CCNC: 17 U/L — SIGNIFICANT CHANGE UP (ref 10–45)
ALT FLD-CCNC: 176 U/L — HIGH (ref 10–45)
ALT FLD-CCNC: 18 U/L — SIGNIFICANT CHANGE UP (ref 10–45)
ALT FLD-CCNC: 188 U/L — HIGH (ref 10–45)
ALT FLD-CCNC: 198 U/L — HIGH (ref 10–45)
ALT FLD-CCNC: 20 U/L — SIGNIFICANT CHANGE UP (ref 10–45)
ALT FLD-CCNC: 22 U/L — SIGNIFICANT CHANGE UP (ref 10–45)
ALT FLD-CCNC: 232 U/L — HIGH (ref 10–45)
ALT FLD-CCNC: 26 U/L — SIGNIFICANT CHANGE UP (ref 10–45)
ALT FLD-CCNC: 265 U/L — HIGH (ref 10–45)
ALT FLD-CCNC: 28 U/L — SIGNIFICANT CHANGE UP (ref 10–45)
ALT FLD-CCNC: 29 U/L — SIGNIFICANT CHANGE UP (ref 10–45)
ALT FLD-CCNC: 30 U/L — SIGNIFICANT CHANGE UP (ref 10–45)
ALT FLD-CCNC: 31 U/L — SIGNIFICANT CHANGE UP (ref 10–45)
ALT FLD-CCNC: 34 U/L — SIGNIFICANT CHANGE UP (ref 10–45)
ALT FLD-CCNC: 345 U/L — HIGH (ref 10–45)
ALT FLD-CCNC: 35 U/L — SIGNIFICANT CHANGE UP (ref 10–45)
ALT FLD-CCNC: 36 U/L — SIGNIFICANT CHANGE UP (ref 10–45)
ALT FLD-CCNC: 37 U/L — SIGNIFICANT CHANGE UP (ref 10–45)
ALT FLD-CCNC: 37 U/L — SIGNIFICANT CHANGE UP (ref 10–45)
ALT FLD-CCNC: 38 U/L — SIGNIFICANT CHANGE UP (ref 10–45)
ALT FLD-CCNC: 39 U/L — SIGNIFICANT CHANGE UP (ref 10–45)
ALT FLD-CCNC: 41 U/L — SIGNIFICANT CHANGE UP (ref 10–45)
ALT FLD-CCNC: 42 U/L — SIGNIFICANT CHANGE UP (ref 10–45)
ALT FLD-CCNC: 45 U/L — SIGNIFICANT CHANGE UP (ref 10–45)
ALT FLD-CCNC: 50 U/L — HIGH (ref 10–45)
ALT FLD-CCNC: 54 U/L — HIGH (ref 10–45)
ALT FLD-CCNC: 544 U/L — HIGH (ref 10–45)
ALT FLD-CCNC: 55 U/L — HIGH (ref 10–45)
ALT FLD-CCNC: 622 U/L — HIGH (ref 10–45)
ALT FLD-CCNC: 67 U/L — HIGH (ref 10–45)
ALT FLD-CCNC: 68 U/L — HIGH (ref 10–45)
ALT FLD-CCNC: 699 U/L — HIGH (ref 10–45)
ALT FLD-CCNC: 72 U/L — HIGH (ref 10–45)
ALT FLD-CCNC: 72 U/L — HIGH (ref 10–45)
ALT FLD-CCNC: 81 U/L — HIGH (ref 10–45)
ALT FLD-CCNC: 941 U/L — HIGH (ref 10–45)
ALT FLD-CCNC: 96 U/L — HIGH (ref 10–45)
AMMONIA BLD-MCNC: 11 UMOL/L — SIGNIFICANT CHANGE UP (ref 11–55)
AMORPH SED URNS QL MICRO: PRESENT
ANION GAP SERPL CALC-SCNC: 10 MMOL/L — SIGNIFICANT CHANGE UP (ref 5–17)
ANION GAP SERPL CALC-SCNC: 11 MMOL/L — SIGNIFICANT CHANGE UP (ref 5–17)
ANION GAP SERPL CALC-SCNC: 11 MMOL/L — SIGNIFICANT CHANGE UP (ref 5–17)
ANION GAP SERPL CALC-SCNC: 12 MMOL/L — SIGNIFICANT CHANGE UP (ref 5–17)
ANION GAP SERPL CALC-SCNC: 13 MMOL/L — SIGNIFICANT CHANGE UP (ref 5–17)
ANION GAP SERPL CALC-SCNC: 14 MMOL/L — SIGNIFICANT CHANGE UP (ref 5–17)
ANION GAP SERPL CALC-SCNC: 15 MMOL/L — SIGNIFICANT CHANGE UP (ref 5–17)
ANION GAP SERPL CALC-SCNC: 16 MMOL/L — SIGNIFICANT CHANGE UP (ref 5–17)
ANION GAP SERPL CALC-SCNC: 17 MMOL/L — SIGNIFICANT CHANGE UP (ref 5–17)
ANION GAP SERPL CALC-SCNC: 18 MMOL/L — HIGH (ref 5–17)
ANION GAP SERPL CALC-SCNC: 19 MMOL/L — HIGH (ref 5–17)
ANION GAP SERPL CALC-SCNC: 20 MMOL/L — HIGH (ref 5–17)
ANION GAP SERPL CALC-SCNC: 22 MMOL/L — HIGH (ref 5–17)
ANION GAP SERPL CALC-SCNC: 22 MMOL/L — HIGH (ref 5–17)
ANION GAP SERPL CALC-SCNC: 9 MMOL/L — SIGNIFICANT CHANGE UP (ref 5–17)
ANISOCYTOSIS BLD QL: SIGNIFICANT CHANGE UP
ANISOCYTOSIS BLD QL: SLIGHT — SIGNIFICANT CHANGE UP
APPEARANCE UR: ABNORMAL
APTT BLD: 24.3 SEC — LOW (ref 24.5–35.6)
APTT BLD: 24.5 SEC — SIGNIFICANT CHANGE UP (ref 24.5–35.6)
APTT BLD: 24.5 SEC — SIGNIFICANT CHANGE UP (ref 24.5–35.6)
APTT BLD: 24.9 SEC — SIGNIFICANT CHANGE UP (ref 24.5–35.6)
APTT BLD: 25.1 SEC — SIGNIFICANT CHANGE UP (ref 24.5–35.6)
APTT BLD: 25.1 SEC — SIGNIFICANT CHANGE UP (ref 24.5–35.6)
APTT BLD: 25.2 SEC — SIGNIFICANT CHANGE UP (ref 24.5–35.6)
APTT BLD: 25.3 SEC — SIGNIFICANT CHANGE UP (ref 24.5–35.6)
APTT BLD: 26 SEC — SIGNIFICANT CHANGE UP (ref 24.5–35.6)
APTT BLD: 26.6 SEC — SIGNIFICANT CHANGE UP (ref 24.5–35.6)
APTT BLD: 26.9 SEC — SIGNIFICANT CHANGE UP (ref 24.5–35.6)
APTT BLD: 26.9 SEC — SIGNIFICANT CHANGE UP (ref 24.5–35.6)
APTT BLD: 27.1 SEC — SIGNIFICANT CHANGE UP (ref 24.5–35.6)
APTT BLD: 27.4 SEC — SIGNIFICANT CHANGE UP (ref 24.5–35.6)
APTT BLD: 28 SEC — SIGNIFICANT CHANGE UP (ref 24.5–35.6)
APTT BLD: 28.6 SEC — SIGNIFICANT CHANGE UP (ref 24.5–35.6)
APTT BLD: 29.1 SEC — SIGNIFICANT CHANGE UP (ref 24.5–35.6)
APTT BLD: 29.4 SEC — SIGNIFICANT CHANGE UP (ref 24.5–35.6)
APTT BLD: 29.6 SEC — SIGNIFICANT CHANGE UP (ref 24.5–35.6)
APTT BLD: 29.7 SEC — SIGNIFICANT CHANGE UP (ref 24.5–35.6)
APTT BLD: 30.1 SEC — SIGNIFICANT CHANGE UP (ref 24.5–35.6)
APTT BLD: 30.3 SEC — SIGNIFICANT CHANGE UP (ref 24.5–35.6)
APTT BLD: 30.7 SEC — SIGNIFICANT CHANGE UP (ref 24.5–35.6)
APTT BLD: 30.8 SEC — SIGNIFICANT CHANGE UP (ref 24.5–35.6)
APTT BLD: 31.3 SEC — SIGNIFICANT CHANGE UP (ref 24.5–35.6)
APTT BLD: 31.3 SEC — SIGNIFICANT CHANGE UP (ref 24.5–35.6)
APTT BLD: 31.4 SEC — SIGNIFICANT CHANGE UP (ref 24.5–35.6)
APTT BLD: 31.5 SEC — SIGNIFICANT CHANGE UP (ref 24.5–35.6)
APTT BLD: 31.7 SEC — SIGNIFICANT CHANGE UP (ref 24.5–35.6)
APTT BLD: 32.2 SEC — SIGNIFICANT CHANGE UP (ref 24.5–35.6)
APTT BLD: 32.9 SEC — SIGNIFICANT CHANGE UP (ref 24.5–35.6)
APTT BLD: 33.1 SEC — SIGNIFICANT CHANGE UP (ref 24.5–35.6)
APTT BLD: 33.3 SEC — SIGNIFICANT CHANGE UP (ref 24.5–35.6)
APTT BLD: 33.4 SEC — SIGNIFICANT CHANGE UP (ref 24.5–35.6)
APTT BLD: 33.5 SEC — SIGNIFICANT CHANGE UP (ref 24.5–35.6)
APTT BLD: 33.8 SEC — SIGNIFICANT CHANGE UP (ref 24.5–35.6)
APTT BLD: 34 SEC — SIGNIFICANT CHANGE UP (ref 24.5–35.6)
APTT BLD: 49.3 SEC — HIGH (ref 24.5–35.6)
APTT BLD: 61.6 SEC — HIGH (ref 24.5–35.6)
APTT BLD: 67.9 SEC — HIGH (ref 24.5–35.6)
APTT BLD: 68.4 SEC — HIGH (ref 24.5–35.6)
APTT BLD: 71 SEC — HIGH (ref 24.5–35.6)
APTT BLD: 83.6 SEC — HIGH (ref 24.5–35.6)
AST SERPL-CCNC: 102 U/L — HIGH (ref 10–40)
AST SERPL-CCNC: 103 U/L — HIGH (ref 10–40)
AST SERPL-CCNC: 106 U/L — HIGH (ref 10–40)
AST SERPL-CCNC: 106 U/L — HIGH (ref 10–40)
AST SERPL-CCNC: 117 U/L — HIGH (ref 10–40)
AST SERPL-CCNC: 1236 U/L — HIGH (ref 10–40)
AST SERPL-CCNC: 124 U/L — HIGH (ref 10–40)
AST SERPL-CCNC: 129 U/L — HIGH (ref 10–40)
AST SERPL-CCNC: 145 U/L — HIGH (ref 10–40)
AST SERPL-CCNC: 173 U/L — HIGH (ref 10–40)
AST SERPL-CCNC: 1742 U/L — HIGH (ref 10–40)
AST SERPL-CCNC: 182 U/L — HIGH (ref 10–40)
AST SERPL-CCNC: 1936 U/L — HIGH (ref 10–40)
AST SERPL-CCNC: 212 U/L — HIGH (ref 10–40)
AST SERPL-CCNC: 222 U/L — HIGH (ref 10–40)
AST SERPL-CCNC: 24 U/L — SIGNIFICANT CHANGE UP (ref 10–40)
AST SERPL-CCNC: 2532 U/L — HIGH (ref 10–40)
AST SERPL-CCNC: 27 U/L — SIGNIFICANT CHANGE UP (ref 10–40)
AST SERPL-CCNC: 281 U/L — HIGH (ref 10–40)
AST SERPL-CCNC: 2867 U/L — HIGH (ref 10–40)
AST SERPL-CCNC: 29 U/L — SIGNIFICANT CHANGE UP (ref 10–40)
AST SERPL-CCNC: 30 U/L — SIGNIFICANT CHANGE UP (ref 10–40)
AST SERPL-CCNC: 30 U/L — SIGNIFICANT CHANGE UP (ref 10–40)
AST SERPL-CCNC: 31 U/L — SIGNIFICANT CHANGE UP (ref 10–40)
AST SERPL-CCNC: 35 U/L — SIGNIFICANT CHANGE UP (ref 10–40)
AST SERPL-CCNC: 3507 U/L — HIGH (ref 10–40)
AST SERPL-CCNC: 3794 U/L — HIGH (ref 10–40)
AST SERPL-CCNC: 39 U/L — SIGNIFICANT CHANGE UP (ref 10–40)
AST SERPL-CCNC: 398 U/L — HIGH (ref 10–40)
AST SERPL-CCNC: 415 U/L — HIGH (ref 10–40)
AST SERPL-CCNC: 42 U/L — HIGH (ref 10–40)
AST SERPL-CCNC: 42 U/L — HIGH (ref 10–40)
AST SERPL-CCNC: 43 U/L — HIGH (ref 10–40)
AST SERPL-CCNC: 43 U/L — HIGH (ref 10–40)
AST SERPL-CCNC: 47 U/L — HIGH (ref 10–40)
AST SERPL-CCNC: 48 U/L — HIGH (ref 10–40)
AST SERPL-CCNC: 50 U/L — HIGH (ref 10–40)
AST SERPL-CCNC: 502 U/L — HIGH (ref 10–40)
AST SERPL-CCNC: 53 U/L — HIGH (ref 10–40)
AST SERPL-CCNC: 58 U/L — HIGH (ref 10–40)
AST SERPL-CCNC: 62 U/L — HIGH (ref 10–40)
AST SERPL-CCNC: 65 U/L — HIGH (ref 10–40)
AST SERPL-CCNC: 66 U/L — HIGH (ref 10–40)
AST SERPL-CCNC: 67 U/L — HIGH (ref 10–40)
AST SERPL-CCNC: 687 U/L — HIGH (ref 10–40)
AST SERPL-CCNC: 73 U/L — HIGH (ref 10–40)
AST SERPL-CCNC: 74 U/L — HIGH (ref 10–40)
AST SERPL-CCNC: 75 U/L — HIGH (ref 10–40)
AST SERPL-CCNC: 75 U/L — HIGH (ref 10–40)
AST SERPL-CCNC: 76 U/L — HIGH (ref 10–40)
AST SERPL-CCNC: 77 U/L — HIGH (ref 10–40)
AST SERPL-CCNC: 79 U/L — HIGH (ref 10–40)
AST SERPL-CCNC: 82 U/L — HIGH (ref 10–40)
AST SERPL-CCNC: 87 U/L — HIGH (ref 10–40)
AUER BODIES BLD QL SMEAR: PRESENT — SIGNIFICANT CHANGE UP
B-OH-BUTYR SERPL-SCNC: 0 MMOL/L — SIGNIFICANT CHANGE UP
B-OH-BUTYR SERPL-SCNC: 0.4 MMOL/L — SIGNIFICANT CHANGE UP
BACTERIA # UR AUTO: ABNORMAL /HPF
BACTERIA # UR AUTO: ABNORMAL /HPF
BACTERIA # UR AUTO: NEGATIVE /HPF — SIGNIFICANT CHANGE UP
BASE EXCESS BLDV CALC-SCNC: -1.4 MMOL/L — SIGNIFICANT CHANGE UP (ref -2–3)
BASE EXCESS BLDV CALC-SCNC: -3 MMOL/L — LOW (ref -2–3)
BASE EXCESS BLDV CALC-SCNC: -4.6 MMOL/L — LOW (ref -2–3)
BASE EXCESS BLDV CALC-SCNC: -5.4 MMOL/L — LOW (ref -2–3)
BASE EXCESS BLDV CALC-SCNC: -5.5 MMOL/L — LOW (ref -2–3)
BASE EXCESS BLDV CALC-SCNC: -6.7 MMOL/L — LOW (ref -2–3)
BASE EXCESS BLDV CALC-SCNC: -7 MMOL/L — LOW (ref -2–3)
BASE EXCESS BLDV CALC-SCNC: -7.1 MMOL/L — LOW (ref -2–3)
BASE EXCESS BLDV CALC-SCNC: -7.4 MMOL/L — LOW (ref -2–3)
BASE EXCESS BLDV CALC-SCNC: -7.6 MMOL/L — LOW (ref -2–3)
BASE EXCESS BLDV CALC-SCNC: -8.3 MMOL/L — LOW (ref -2–3)
BASE EXCESS BLDV CALC-SCNC: -8.4 MMOL/L — LOW (ref -2–3)
BASE EXCESS BLDV CALC-SCNC: -9.2 MMOL/L — LOW (ref -2–3)
BASE EXCESS BLDV CALC-SCNC: 0.1 MMOL/L — SIGNIFICANT CHANGE UP (ref -2–3)
BASE EXCESS BLDV CALC-SCNC: 2.5 MMOL/L — SIGNIFICANT CHANGE UP (ref -2–3)
BASOPHILS # BLD AUTO: 0 K/UL — SIGNIFICANT CHANGE UP (ref 0–0.2)
BASOPHILS # BLD AUTO: 0.01 K/UL — SIGNIFICANT CHANGE UP (ref 0–0.2)
BASOPHILS # BLD AUTO: 0.02 K/UL — SIGNIFICANT CHANGE UP (ref 0–0.2)
BASOPHILS # BLD AUTO: 0.03 K/UL — SIGNIFICANT CHANGE UP (ref 0–0.2)
BASOPHILS # BLD AUTO: 0.04 K/UL — SIGNIFICANT CHANGE UP (ref 0–0.2)
BASOPHILS # BLD AUTO: 0.05 K/UL — SIGNIFICANT CHANGE UP (ref 0–0.2)
BASOPHILS # BLD AUTO: 0.06 K/UL — SIGNIFICANT CHANGE UP (ref 0–0.2)
BASOPHILS # BLD AUTO: 0.07 K/UL — SIGNIFICANT CHANGE UP (ref 0–0.2)
BASOPHILS # BLD AUTO: 0.08 K/UL — SIGNIFICANT CHANGE UP (ref 0–0.2)
BASOPHILS # BLD AUTO: 0.09 K/UL — SIGNIFICANT CHANGE UP (ref 0–0.2)
BASOPHILS # BLD AUTO: 0.09 K/UL — SIGNIFICANT CHANGE UP (ref 0–0.2)
BASOPHILS # BLD AUTO: 0.1 K/UL — SIGNIFICANT CHANGE UP (ref 0–0.2)
BASOPHILS # BLD AUTO: 0.11 K/UL — SIGNIFICANT CHANGE UP (ref 0–0.2)
BASOPHILS NFR BLD AUTO: 0 % — SIGNIFICANT CHANGE UP (ref 0–2)
BASOPHILS NFR BLD AUTO: 0.1 % — SIGNIFICANT CHANGE UP (ref 0–2)
BASOPHILS NFR BLD AUTO: 0.2 % — SIGNIFICANT CHANGE UP (ref 0–2)
BASOPHILS NFR BLD AUTO: 0.3 % — SIGNIFICANT CHANGE UP (ref 0–2)
BASOPHILS NFR BLD AUTO: 0.4 % — SIGNIFICANT CHANGE UP (ref 0–2)
BASOPHILS NFR BLD AUTO: 0.4 % — SIGNIFICANT CHANGE UP (ref 0–2)
BASOPHILS NFR BLD AUTO: 0.5 % — SIGNIFICANT CHANGE UP (ref 0–2)
BASOPHILS NFR BLD AUTO: 0.6 % — SIGNIFICANT CHANGE UP (ref 0–2)
BASOPHILS NFR BLD AUTO: 0.7 % — SIGNIFICANT CHANGE UP (ref 0–2)
BASOPHILS NFR BLD AUTO: 0.7 % — SIGNIFICANT CHANGE UP (ref 0–2)
BASOPHILS NFR BLD AUTO: 0.8 % — SIGNIFICANT CHANGE UP (ref 0–2)
BASOPHILS NFR BLD AUTO: 0.9 % — SIGNIFICANT CHANGE UP (ref 0–2)
BILIRUB DIRECT SERPL-MCNC: 0.3 MG/DL — SIGNIFICANT CHANGE UP (ref 0–0.3)
BILIRUB DIRECT SERPL-MCNC: 0.9 MG/DL — HIGH (ref 0–0.3)
BILIRUB INDIRECT FLD-MCNC: 0.3 MG/DL — SIGNIFICANT CHANGE UP (ref 0.2–1)
BILIRUB INDIRECT FLD-MCNC: 0.5 MG/DL — SIGNIFICANT CHANGE UP (ref 0.2–1)
BILIRUB SERPL-MCNC: 0.3 MG/DL — SIGNIFICANT CHANGE UP (ref 0.2–1.2)
BILIRUB SERPL-MCNC: 0.5 MG/DL — SIGNIFICANT CHANGE UP (ref 0.2–1.2)
BILIRUB SERPL-MCNC: 0.6 MG/DL — SIGNIFICANT CHANGE UP (ref 0.2–1.2)
BILIRUB SERPL-MCNC: 0.7 MG/DL — SIGNIFICANT CHANGE UP (ref 0.2–1.2)
BILIRUB SERPL-MCNC: 0.8 MG/DL — SIGNIFICANT CHANGE UP (ref 0.2–1.2)
BILIRUB SERPL-MCNC: 0.9 MG/DL — SIGNIFICANT CHANGE UP (ref 0.2–1.2)
BILIRUB SERPL-MCNC: 1 MG/DL — SIGNIFICANT CHANGE UP (ref 0.2–1.2)
BILIRUB SERPL-MCNC: 1.1 MG/DL — SIGNIFICANT CHANGE UP (ref 0.2–1.2)
BILIRUB SERPL-MCNC: 1.2 MG/DL — SIGNIFICANT CHANGE UP (ref 0.2–1.2)
BILIRUB SERPL-MCNC: 1.3 MG/DL — HIGH (ref 0.2–1.2)
BILIRUB SERPL-MCNC: 1.4 MG/DL — HIGH (ref 0.2–1.2)
BILIRUB UR-MCNC: NEGATIVE — SIGNIFICANT CHANGE UP
BLANDM BLD POS QL PROBE: SIGNIFICANT CHANGE UP
BLD GP AB SCN SERPL QL: NEGATIVE — SIGNIFICANT CHANGE UP
BUN SERPL-MCNC: 19 MG/DL — SIGNIFICANT CHANGE UP (ref 7–23)
BUN SERPL-MCNC: 21 MG/DL — SIGNIFICANT CHANGE UP (ref 7–23)
BUN SERPL-MCNC: 21 MG/DL — SIGNIFICANT CHANGE UP (ref 7–23)
BUN SERPL-MCNC: 22 MG/DL — SIGNIFICANT CHANGE UP (ref 7–23)
BUN SERPL-MCNC: 23 MG/DL — SIGNIFICANT CHANGE UP (ref 7–23)
BUN SERPL-MCNC: 24 MG/DL — HIGH (ref 7–23)
BUN SERPL-MCNC: 25 MG/DL — HIGH (ref 7–23)
BUN SERPL-MCNC: 26 MG/DL — HIGH (ref 7–23)
BUN SERPL-MCNC: 27 MG/DL — HIGH (ref 7–23)
BUN SERPL-MCNC: 27 MG/DL — HIGH (ref 7–23)
BUN SERPL-MCNC: 28 MG/DL — HIGH (ref 7–23)
BUN SERPL-MCNC: 29 MG/DL — HIGH (ref 7–23)
BUN SERPL-MCNC: 30 MG/DL — HIGH (ref 7–23)
BUN SERPL-MCNC: 31 MG/DL — HIGH (ref 7–23)
BUN SERPL-MCNC: 31 MG/DL — HIGH (ref 7–23)
BUN SERPL-MCNC: 32 MG/DL — HIGH (ref 7–23)
BUN SERPL-MCNC: 33 MG/DL — HIGH (ref 7–23)
BUN SERPL-MCNC: 34 MG/DL — HIGH (ref 7–23)
BUN SERPL-MCNC: 35 MG/DL — HIGH (ref 7–23)
BUN SERPL-MCNC: 37 MG/DL — HIGH (ref 7–23)
BUN SERPL-MCNC: 38 MG/DL — HIGH (ref 7–23)
BUN SERPL-MCNC: 40 MG/DL — HIGH (ref 7–23)
BUN SERPL-MCNC: 41 MG/DL — HIGH (ref 7–23)
BUN SERPL-MCNC: 41 MG/DL — HIGH (ref 7–23)
BUN SERPL-MCNC: 42 MG/DL — HIGH (ref 7–23)
BUN SERPL-MCNC: 43 MG/DL — HIGH (ref 7–23)
BUN SERPL-MCNC: 43 MG/DL — HIGH (ref 7–23)
BUN SERPL-MCNC: 45 MG/DL — HIGH (ref 7–23)
BUN SERPL-MCNC: 45 MG/DL — HIGH (ref 7–23)
BUN SERPL-MCNC: 46 MG/DL — HIGH (ref 7–23)
BUN SERPL-MCNC: 46 MG/DL — HIGH (ref 7–23)
BUN SERPL-MCNC: 47 MG/DL — HIGH (ref 7–23)
BUN SERPL-MCNC: 48 MG/DL — HIGH (ref 7–23)
BUN SERPL-MCNC: 50 MG/DL — HIGH (ref 7–23)
BUN SERPL-MCNC: 50 MG/DL — HIGH (ref 7–23)
BUN SERPL-MCNC: 51 MG/DL — HIGH (ref 7–23)
BUN SERPL-MCNC: 52 MG/DL — HIGH (ref 7–23)
BUN SERPL-MCNC: 54 MG/DL — HIGH (ref 7–23)
BUN SERPL-MCNC: 55 MG/DL — HIGH (ref 7–23)
BUN SERPL-MCNC: 56 MG/DL — HIGH (ref 7–23)
BUN SERPL-MCNC: 58 MG/DL — HIGH (ref 7–23)
BUN SERPL-MCNC: 59 MG/DL — HIGH (ref 7–23)
BUN SERPL-MCNC: 63 MG/DL — HIGH (ref 7–23)
BUN SERPL-MCNC: 64 MG/DL — HIGH (ref 7–23)
BUN SERPL-MCNC: 65 MG/DL — HIGH (ref 7–23)
BUN SERPL-MCNC: 66 MG/DL — HIGH (ref 7–23)
BUN SERPL-MCNC: 67 MG/DL — HIGH (ref 7–23)
BUN SERPL-MCNC: 69 MG/DL — HIGH (ref 7–23)
BUN SERPL-MCNC: 70 MG/DL — HIGH (ref 7–23)
BUN SERPL-MCNC: 71 MG/DL — HIGH (ref 7–23)
BUN SERPL-MCNC: 72 MG/DL — HIGH (ref 7–23)
BUN SERPL-MCNC: 72 MG/DL — HIGH (ref 7–23)
BUN SERPL-MCNC: 74 MG/DL — HIGH (ref 7–23)
BUN SERPL-MCNC: 77 MG/DL — HIGH (ref 7–23)
BUN SERPL-MCNC: 78 MG/DL — HIGH (ref 7–23)
BUN SERPL-MCNC: 79 MG/DL — HIGH (ref 7–23)
BUN SERPL-MCNC: 82 MG/DL — HIGH (ref 7–23)
BUN SERPL-MCNC: 84 MG/DL — HIGH (ref 7–23)
BUN SERPL-MCNC: 85 MG/DL — HIGH (ref 7–23)
BUN SERPL-MCNC: 86 MG/DL — HIGH (ref 7–23)
BUN SERPL-MCNC: 90 MG/DL — HIGH (ref 7–23)
BUN SERPL-MCNC: 93 MG/DL — HIGH (ref 7–23)
BUN SERPL-MCNC: 96 MG/DL — HIGH (ref 7–23)
BUN SERPL-MCNC: 99 MG/DL — HIGH (ref 7–23)
BURR CELLS BLD QL SMEAR: PRESENT — SIGNIFICANT CHANGE UP
BURR CELLS BLD QL SMEAR: PRESENT — SIGNIFICANT CHANGE UP
BURR CELLS BLD QL SMEAR: SLIGHT — SIGNIFICANT CHANGE UP
C DIFF GDH STL QL: POSITIVE — SIGNIFICANT CHANGE UP
C DIFF GDH STL QL: SIGNIFICANT CHANGE UP
CA-I SERPL-SCNC: 1.02 MMOL/L — LOW (ref 1.15–1.33)
CA-I SERPL-SCNC: 1.04 MMOL/L — LOW (ref 1.15–1.33)
CA-I SERPL-SCNC: 1.04 MMOL/L — LOW (ref 1.15–1.33)
CA-I SERPL-SCNC: 1.07 MMOL/L — LOW (ref 1.15–1.33)
CA-I SERPL-SCNC: 1.1 MMOL/L — LOW (ref 1.15–1.33)
CA-I SERPL-SCNC: 1.13 MMOL/L — LOW (ref 1.15–1.33)
CA-I SERPL-SCNC: 1.13 MMOL/L — LOW (ref 1.15–1.33)
CA-I SERPL-SCNC: 1.15 MMOL/L — SIGNIFICANT CHANGE UP (ref 1.15–1.33)
CA-I SERPL-SCNC: 1.15 MMOL/L — SIGNIFICANT CHANGE UP (ref 1.15–1.33)
CA-I SERPL-SCNC: 1.16 MMOL/L — SIGNIFICANT CHANGE UP (ref 1.15–1.33)
CA-I SERPL-SCNC: 1.17 MMOL/L — SIGNIFICANT CHANGE UP (ref 1.15–1.33)
CA-I SERPL-SCNC: 1.2 MMOL/L — SIGNIFICANT CHANGE UP (ref 1.15–1.33)
CA-I SERPL-SCNC: 1.25 MMOL/L — SIGNIFICANT CHANGE UP (ref 1.15–1.33)
CA-I SERPL-SCNC: 1.3 MMOL/L — SIGNIFICANT CHANGE UP (ref 1.15–1.33)
CA-I SERPL-SCNC: 1.3 MMOL/L — SIGNIFICANT CHANGE UP (ref 1.15–1.33)
CALCIUM SERPL-MCNC: 10 MG/DL — SIGNIFICANT CHANGE UP (ref 8.4–10.5)
CALCIUM SERPL-MCNC: 10 MG/DL — SIGNIFICANT CHANGE UP (ref 8.4–10.5)
CALCIUM SERPL-MCNC: 6.9 MG/DL — LOW (ref 8.4–10.5)
CALCIUM SERPL-MCNC: 7 MG/DL — LOW (ref 8.4–10.5)
CALCIUM SERPL-MCNC: 7.2 MG/DL — LOW (ref 8.4–10.5)
CALCIUM SERPL-MCNC: 7.3 MG/DL — LOW (ref 8.4–10.5)
CALCIUM SERPL-MCNC: 7.4 MG/DL — LOW (ref 8.4–10.5)
CALCIUM SERPL-MCNC: 7.5 MG/DL — LOW (ref 8.4–10.5)
CALCIUM SERPL-MCNC: 7.6 MG/DL — LOW (ref 8.4–10.5)
CALCIUM SERPL-MCNC: 7.7 MG/DL — LOW (ref 8.4–10.5)
CALCIUM SERPL-MCNC: 7.8 MG/DL — LOW (ref 8.4–10.5)
CALCIUM SERPL-MCNC: 7.9 MG/DL — LOW (ref 8.4–10.5)
CALCIUM SERPL-MCNC: 8 MG/DL — LOW (ref 8.4–10.5)
CALCIUM SERPL-MCNC: 8.1 MG/DL — LOW (ref 8.4–10.5)
CALCIUM SERPL-MCNC: 8.2 MG/DL — LOW (ref 8.4–10.5)
CALCIUM SERPL-MCNC: 8.3 MG/DL — LOW (ref 8.4–10.5)
CALCIUM SERPL-MCNC: 8.4 MG/DL — SIGNIFICANT CHANGE UP (ref 8.4–10.5)
CALCIUM SERPL-MCNC: 8.6 MG/DL — SIGNIFICANT CHANGE UP (ref 8.4–10.5)
CALCIUM SERPL-MCNC: 8.7 MG/DL — SIGNIFICANT CHANGE UP (ref 8.4–10.5)
CALCIUM SERPL-MCNC: 8.9 MG/DL — SIGNIFICANT CHANGE UP (ref 8.4–10.5)
CALCIUM SERPL-MCNC: 9.1 MG/DL — SIGNIFICANT CHANGE UP (ref 8.4–10.5)
CALCIUM SERPL-MCNC: 9.2 MG/DL — SIGNIFICANT CHANGE UP (ref 8.4–10.5)
CALCIUM SERPL-MCNC: 9.2 MG/DL — SIGNIFICANT CHANGE UP (ref 8.4–10.5)
CALCIUM SERPL-MCNC: 9.3 MG/DL — SIGNIFICANT CHANGE UP (ref 8.4–10.5)
CALCIUM SERPL-MCNC: 9.4 MG/DL — SIGNIFICANT CHANGE UP (ref 8.4–10.5)
CALCIUM SERPL-MCNC: 9.5 MG/DL — SIGNIFICANT CHANGE UP (ref 8.4–10.5)
CALCIUM SERPL-MCNC: 9.5 MG/DL — SIGNIFICANT CHANGE UP (ref 8.4–10.5)
CALCIUM SERPL-MCNC: 9.6 MG/DL — SIGNIFICANT CHANGE UP (ref 8.4–10.5)
CALCIUM SERPL-MCNC: 9.6 MG/DL — SIGNIFICANT CHANGE UP (ref 8.4–10.5)
CALCIUM SERPL-MCNC: 9.7 MG/DL — SIGNIFICANT CHANGE UP (ref 8.4–10.5)
CALCIUM SERPL-MCNC: 9.8 MG/DL — SIGNIFICANT CHANGE UP (ref 8.4–10.5)
CALCIUM SERPL-MCNC: 9.8 MG/DL — SIGNIFICANT CHANGE UP (ref 8.4–10.5)
CALCIUM SERPL-MCNC: 9.9 MG/DL — SIGNIFICANT CHANGE UP (ref 8.4–10.5)
CAST: 12 /LPF — HIGH (ref 0–4)
CAST: 13 /LPF — HIGH (ref 0–4)
CAST: 13 /LPF — HIGH (ref 0–4)
CAST: 19 /LPF — HIGH (ref 0–4)
CAST: 4 /LPF — SIGNIFICANT CHANGE UP (ref 0–4)
CGA FLD-MCNC: 2058 NG/ML — HIGH (ref 0–101.8)
CHLORIDE BLDV-SCNC: 108 MMOL/L — SIGNIFICANT CHANGE UP (ref 96–108)
CHLORIDE BLDV-SCNC: 108 MMOL/L — SIGNIFICANT CHANGE UP (ref 96–108)
CHLORIDE BLDV-SCNC: 109 MMOL/L — HIGH (ref 96–108)
CHLORIDE BLDV-SCNC: 109 MMOL/L — HIGH (ref 96–108)
CHLORIDE BLDV-SCNC: 110 MMOL/L — HIGH (ref 96–108)
CHLORIDE BLDV-SCNC: 110 MMOL/L — HIGH (ref 96–108)
CHLORIDE BLDV-SCNC: 111 MMOL/L — HIGH (ref 96–108)
CHLORIDE BLDV-SCNC: 111 MMOL/L — HIGH (ref 96–108)
CHLORIDE BLDV-SCNC: 112 MMOL/L — HIGH (ref 96–108)
CHLORIDE BLDV-SCNC: 113 MMOL/L — HIGH (ref 96–108)
CHLORIDE BLDV-SCNC: 115 MMOL/L — HIGH (ref 96–108)
CHLORIDE BLDV-SCNC: 116 MMOL/L — HIGH (ref 96–108)
CHLORIDE BLDV-SCNC: 121 MMOL/L — HIGH (ref 96–108)
CHLORIDE SERPL-SCNC: 104 MMOL/L — SIGNIFICANT CHANGE UP (ref 96–108)
CHLORIDE SERPL-SCNC: 105 MMOL/L — SIGNIFICANT CHANGE UP (ref 96–108)
CHLORIDE SERPL-SCNC: 106 MMOL/L — SIGNIFICANT CHANGE UP (ref 96–108)
CHLORIDE SERPL-SCNC: 107 MMOL/L — SIGNIFICANT CHANGE UP (ref 96–108)
CHLORIDE SERPL-SCNC: 108 MMOL/L — SIGNIFICANT CHANGE UP (ref 96–108)
CHLORIDE SERPL-SCNC: 109 MMOL/L — HIGH (ref 96–108)
CHLORIDE SERPL-SCNC: 110 MMOL/L — HIGH (ref 96–108)
CHLORIDE SERPL-SCNC: 111 MMOL/L — HIGH (ref 96–108)
CHLORIDE SERPL-SCNC: 112 MMOL/L — HIGH (ref 96–108)
CHLORIDE SERPL-SCNC: 113 MMOL/L — HIGH (ref 96–108)
CHLORIDE SERPL-SCNC: 114 MMOL/L — HIGH (ref 96–108)
CHLORIDE SERPL-SCNC: 115 MMOL/L — HIGH (ref 96–108)
CHLORIDE SERPL-SCNC: 116 MMOL/L — HIGH (ref 96–108)
CHLORIDE SERPL-SCNC: 117 MMOL/L — HIGH (ref 96–108)
CHLORIDE SERPL-SCNC: 117 MMOL/L — HIGH (ref 96–108)
CHLORIDE SERPL-SCNC: 118 MMOL/L — HIGH (ref 96–108)
CHLORIDE SERPL-SCNC: 119 MMOL/L — HIGH (ref 96–108)
CHLORIDE SERPL-SCNC: 119 MMOL/L — HIGH (ref 96–108)
CHLORIDE SERPL-SCNC: 120 MMOL/L — HIGH (ref 96–108)
CHLORIDE SERPL-SCNC: 122 MMOL/L — HIGH (ref 96–108)
CHLORIDE SERPL-SCNC: 123 MMOL/L — HIGH (ref 96–108)
CK MB BLD-MCNC: 0.1 % — SIGNIFICANT CHANGE UP (ref 0–3.5)
CK MB BLD-MCNC: 0.2 % — SIGNIFICANT CHANGE UP (ref 0–3.5)
CK MB BLD-MCNC: 0.2 % — SIGNIFICANT CHANGE UP (ref 0–3.5)
CK MB BLD-MCNC: 2 % — SIGNIFICANT CHANGE UP (ref 0–3.5)
CK MB CFR SERPL CALC: 1.2 NG/ML — SIGNIFICANT CHANGE UP (ref 0–6.7)
CK MB CFR SERPL CALC: 2 NG/ML — SIGNIFICANT CHANGE UP (ref 0–6.7)
CK MB CFR SERPL CALC: 2.3 NG/ML — SIGNIFICANT CHANGE UP (ref 0–6.7)
CK MB CFR SERPL CALC: 3 NG/ML — SIGNIFICANT CHANGE UP (ref 0–6.7)
CK MB CFR SERPL CALC: 3.2 NG/ML — SIGNIFICANT CHANGE UP (ref 0–6.7)
CK MB CFR SERPL CALC: 3.3 NG/ML — SIGNIFICANT CHANGE UP (ref 0–6.7)
CK MB CFR SERPL CALC: 3.3 NG/ML — SIGNIFICANT CHANGE UP (ref 0–6.7)
CK MB CFR SERPL CALC: 3.5 NG/ML — SIGNIFICANT CHANGE UP (ref 0–6.7)
CK MB CFR SERPL CALC: 3.6 NG/ML — SIGNIFICANT CHANGE UP (ref 0–6.7)
CK MB CFR SERPL CALC: 5 NG/ML — SIGNIFICANT CHANGE UP (ref 0–6.7)
CK SERPL-CCNC: 1035 U/L — HIGH (ref 30–200)
CK SERPL-CCNC: 1149 U/L — HIGH (ref 30–200)
CK SERPL-CCNC: 1328 U/L — HIGH (ref 30–200)
CK SERPL-CCNC: 137 U/L — SIGNIFICANT CHANGE UP (ref 30–200)
CK SERPL-CCNC: 152 U/L — SIGNIFICANT CHANGE UP (ref 30–200)
CK SERPL-CCNC: 155 U/L — SIGNIFICANT CHANGE UP (ref 30–200)
CK SERPL-CCNC: 159 U/L — SIGNIFICANT CHANGE UP (ref 30–200)
CK SERPL-CCNC: 1668 U/L — HIGH (ref 30–200)
CK SERPL-CCNC: 2102 U/L — HIGH (ref 30–200)
CK SERPL-CCNC: 2122 U/L — HIGH (ref 30–200)
CK SERPL-CCNC: 221 U/L — HIGH (ref 30–200)
CK SERPL-CCNC: 228 U/L — HIGH (ref 30–200)
CK SERPL-CCNC: 2285 U/L — HIGH (ref 30–200)
CK SERPL-CCNC: 2544 U/L — HIGH (ref 30–200)
CK SERPL-CCNC: 2829 U/L — HIGH (ref 30–200)
CK SERPL-CCNC: 3042 U/L — HIGH (ref 30–200)
CK SERPL-CCNC: 3311 U/L — HIGH (ref 30–200)
CK SERPL-CCNC: 3633 U/L — HIGH (ref 30–200)
CK SERPL-CCNC: 558 U/L — HIGH (ref 30–200)
CK SERPL-CCNC: 854 U/L — HIGH (ref 30–200)
CO2 BLDV-SCNC: 17 MMOL/L — LOW (ref 22–26)
CO2 BLDV-SCNC: 18 MMOL/L — LOW (ref 22–26)
CO2 BLDV-SCNC: 18 MMOL/L — LOW (ref 22–26)
CO2 BLDV-SCNC: 19 MMOL/L — LOW (ref 22–26)
CO2 BLDV-SCNC: 19 MMOL/L — LOW (ref 22–26)
CO2 BLDV-SCNC: 20 MMOL/L — LOW (ref 22–26)
CO2 BLDV-SCNC: 23 MMOL/L — SIGNIFICANT CHANGE UP (ref 22–26)
CO2 BLDV-SCNC: 27 MMOL/L — HIGH (ref 22–26)
CO2 BLDV-SCNC: 28 MMOL/L — HIGH (ref 22–26)
CO2 BLDV-SCNC: 28 MMOL/L — HIGH (ref 22–26)
CO2 SERPL-SCNC: 12 MMOL/L — LOW (ref 22–31)
CO2 SERPL-SCNC: 13 MMOL/L — LOW (ref 22–31)
CO2 SERPL-SCNC: 14 MMOL/L — LOW (ref 22–31)
CO2 SERPL-SCNC: 15 MMOL/L — LOW (ref 22–31)
CO2 SERPL-SCNC: 16 MMOL/L — LOW (ref 22–31)
CO2 SERPL-SCNC: 17 MMOL/L — LOW (ref 22–31)
CO2 SERPL-SCNC: 18 MMOL/L — LOW (ref 22–31)
CO2 SERPL-SCNC: 19 MMOL/L — LOW (ref 22–31)
CO2 SERPL-SCNC: 20 MMOL/L — LOW (ref 22–31)
CO2 SERPL-SCNC: 21 MMOL/L — LOW (ref 22–31)
CO2 SERPL-SCNC: 22 MMOL/L — SIGNIFICANT CHANGE UP (ref 22–31)
CO2 SERPL-SCNC: 23 MMOL/L — SIGNIFICANT CHANGE UP (ref 22–31)
CO2 SERPL-SCNC: 24 MMOL/L — SIGNIFICANT CHANGE UP (ref 22–31)
CO2 SERPL-SCNC: 25 MMOL/L — SIGNIFICANT CHANGE UP (ref 22–31)
CO2 SERPL-SCNC: 26 MMOL/L — SIGNIFICANT CHANGE UP (ref 22–31)
CO2 SERPL-SCNC: 26 MMOL/L — SIGNIFICANT CHANGE UP (ref 22–31)
CO2 SERPL-SCNC: 27 MMOL/L — SIGNIFICANT CHANGE UP (ref 22–31)
COARSE GRAN CASTS #/AREA URNS AUTO: PRESENT
COLOR SPEC: SIGNIFICANT CHANGE UP
COLOR SPEC: SIGNIFICANT CHANGE UP
COLOR SPEC: YELLOW — SIGNIFICANT CHANGE UP
CORTICOSTEROID BINDING GLOBULIN RESULT: 1.8 MG/DL — SIGNIFICANT CHANGE UP
CORTIS AM PEAK SERPL-MCNC: 17.7 UG/DL — SIGNIFICANT CHANGE UP (ref 6–18.4)
CORTIS AM PEAK SERPL-MCNC: 17.8 UG/DL — SIGNIFICANT CHANGE UP (ref 6–18.4)
CORTIS AM PEAK SERPL-MCNC: 37.1 UG/DL — HIGH (ref 6–18.4)
CORTIS F/TOTAL MFR SERPL: 72 % — SIGNIFICANT CHANGE UP
CORTIS SERPL-MCNC: 44 UG/DL — HIGH
CORTISOL, FREE RESULT: 32 UG/DL — HIGH
CREAT ?TM UR-MCNC: 108 MG/DL — SIGNIFICANT CHANGE UP
CREAT SERPL-MCNC: 0.7 MG/DL — SIGNIFICANT CHANGE UP (ref 0.5–1.3)
CREAT SERPL-MCNC: 0.72 MG/DL — SIGNIFICANT CHANGE UP (ref 0.5–1.3)
CREAT SERPL-MCNC: 0.73 MG/DL — SIGNIFICANT CHANGE UP (ref 0.5–1.3)
CREAT SERPL-MCNC: 0.75 MG/DL — SIGNIFICANT CHANGE UP (ref 0.5–1.3)
CREAT SERPL-MCNC: 0.75 MG/DL — SIGNIFICANT CHANGE UP (ref 0.5–1.3)
CREAT SERPL-MCNC: 0.76 MG/DL — SIGNIFICANT CHANGE UP (ref 0.5–1.3)
CREAT SERPL-MCNC: 0.78 MG/DL — SIGNIFICANT CHANGE UP (ref 0.5–1.3)
CREAT SERPL-MCNC: 0.79 MG/DL — SIGNIFICANT CHANGE UP (ref 0.5–1.3)
CREAT SERPL-MCNC: 0.82 MG/DL — SIGNIFICANT CHANGE UP (ref 0.5–1.3)
CREAT SERPL-MCNC: 0.83 MG/DL — SIGNIFICANT CHANGE UP (ref 0.5–1.3)
CREAT SERPL-MCNC: 0.83 MG/DL — SIGNIFICANT CHANGE UP (ref 0.5–1.3)
CREAT SERPL-MCNC: 0.84 MG/DL — SIGNIFICANT CHANGE UP (ref 0.5–1.3)
CREAT SERPL-MCNC: 0.85 MG/DL — SIGNIFICANT CHANGE UP (ref 0.5–1.3)
CREAT SERPL-MCNC: 0.85 MG/DL — SIGNIFICANT CHANGE UP (ref 0.5–1.3)
CREAT SERPL-MCNC: 0.87 MG/DL — SIGNIFICANT CHANGE UP (ref 0.5–1.3)
CREAT SERPL-MCNC: 0.89 MG/DL — SIGNIFICANT CHANGE UP (ref 0.5–1.3)
CREAT SERPL-MCNC: 0.9 MG/DL — SIGNIFICANT CHANGE UP (ref 0.5–1.3)
CREAT SERPL-MCNC: 0.91 MG/DL — SIGNIFICANT CHANGE UP (ref 0.5–1.3)
CREAT SERPL-MCNC: 0.95 MG/DL — SIGNIFICANT CHANGE UP (ref 0.5–1.3)
CREAT SERPL-MCNC: 0.96 MG/DL — SIGNIFICANT CHANGE UP (ref 0.5–1.3)
CREAT SERPL-MCNC: 0.99 MG/DL — SIGNIFICANT CHANGE UP (ref 0.5–1.3)
CREAT SERPL-MCNC: 1.03 MG/DL — SIGNIFICANT CHANGE UP (ref 0.5–1.3)
CREAT SERPL-MCNC: 1.06 MG/DL — SIGNIFICANT CHANGE UP (ref 0.5–1.3)
CREAT SERPL-MCNC: 1.06 MG/DL — SIGNIFICANT CHANGE UP (ref 0.5–1.3)
CREAT SERPL-MCNC: 1.07 MG/DL — SIGNIFICANT CHANGE UP (ref 0.5–1.3)
CREAT SERPL-MCNC: 1.07 MG/DL — SIGNIFICANT CHANGE UP (ref 0.5–1.3)
CREAT SERPL-MCNC: 1.08 MG/DL — SIGNIFICANT CHANGE UP (ref 0.5–1.3)
CREAT SERPL-MCNC: 1.11 MG/DL — SIGNIFICANT CHANGE UP (ref 0.5–1.3)
CREAT SERPL-MCNC: 1.13 MG/DL — SIGNIFICANT CHANGE UP (ref 0.5–1.3)
CREAT SERPL-MCNC: 1.15 MG/DL — SIGNIFICANT CHANGE UP (ref 0.5–1.3)
CREAT SERPL-MCNC: 1.16 MG/DL — SIGNIFICANT CHANGE UP (ref 0.5–1.3)
CREAT SERPL-MCNC: 1.17 MG/DL — SIGNIFICANT CHANGE UP (ref 0.5–1.3)
CREAT SERPL-MCNC: 1.18 MG/DL — SIGNIFICANT CHANGE UP (ref 0.5–1.3)
CREAT SERPL-MCNC: 1.21 MG/DL — SIGNIFICANT CHANGE UP (ref 0.5–1.3)
CREAT SERPL-MCNC: 1.24 MG/DL — SIGNIFICANT CHANGE UP (ref 0.5–1.3)
CREAT SERPL-MCNC: 1.25 MG/DL — SIGNIFICANT CHANGE UP (ref 0.5–1.3)
CREAT SERPL-MCNC: 1.27 MG/DL — SIGNIFICANT CHANGE UP (ref 0.5–1.3)
CREAT SERPL-MCNC: 1.3 MG/DL — SIGNIFICANT CHANGE UP (ref 0.5–1.3)
CREAT SERPL-MCNC: 1.31 MG/DL — HIGH (ref 0.5–1.3)
CREAT SERPL-MCNC: 1.33 MG/DL — HIGH (ref 0.5–1.3)
CREAT SERPL-MCNC: 1.34 MG/DL — HIGH (ref 0.5–1.3)
CREAT SERPL-MCNC: 1.35 MG/DL — HIGH (ref 0.5–1.3)
CREAT SERPL-MCNC: 1.38 MG/DL — HIGH (ref 0.5–1.3)
CREAT SERPL-MCNC: 1.38 MG/DL — HIGH (ref 0.5–1.3)
CREAT SERPL-MCNC: 1.4 MG/DL — HIGH (ref 0.5–1.3)
CREAT SERPL-MCNC: 1.41 MG/DL — HIGH (ref 0.5–1.3)
CREAT SERPL-MCNC: 1.43 MG/DL — HIGH (ref 0.5–1.3)
CREAT SERPL-MCNC: 1.44 MG/DL — HIGH (ref 0.5–1.3)
CREAT SERPL-MCNC: 1.49 MG/DL — HIGH (ref 0.5–1.3)
CREAT SERPL-MCNC: 1.51 MG/DL — HIGH (ref 0.5–1.3)
CREAT SERPL-MCNC: 1.53 MG/DL — HIGH (ref 0.5–1.3)
CREAT SERPL-MCNC: 1.54 MG/DL — HIGH (ref 0.5–1.3)
CREAT SERPL-MCNC: 1.54 MG/DL — HIGH (ref 0.5–1.3)
CREAT SERPL-MCNC: 1.59 MG/DL — HIGH (ref 0.5–1.3)
CREAT SERPL-MCNC: 1.6 MG/DL — HIGH (ref 0.5–1.3)
CREAT SERPL-MCNC: 1.61 MG/DL — HIGH (ref 0.5–1.3)
CREAT SERPL-MCNC: 1.65 MG/DL — HIGH (ref 0.5–1.3)
CREAT SERPL-MCNC: 1.66 MG/DL — HIGH (ref 0.5–1.3)
CREAT SERPL-MCNC: 1.7 MG/DL — HIGH (ref 0.5–1.3)
CREAT SERPL-MCNC: 1.74 MG/DL — HIGH (ref 0.5–1.3)
CREAT SERPL-MCNC: 1.74 MG/DL — HIGH (ref 0.5–1.3)
CREAT SERPL-MCNC: 1.86 MG/DL — HIGH (ref 0.5–1.3)
CREAT SERPL-MCNC: 1.94 MG/DL — HIGH (ref 0.5–1.3)
CREAT SERPL-MCNC: 2.01 MG/DL — HIGH (ref 0.5–1.3)
CREAT SERPL-MCNC: 2.07 MG/DL — HIGH (ref 0.5–1.3)
CREAT SERPL-MCNC: 2.2 MG/DL — HIGH (ref 0.5–1.3)
CREAT SERPL-MCNC: 2.5 MG/DL — HIGH (ref 0.5–1.3)
CREAT SERPL-MCNC: 2.75 MG/DL — HIGH (ref 0.5–1.3)
CREAT SERPL-MCNC: 2.87 MG/DL — HIGH (ref 0.5–1.3)
CREAT SERPL-MCNC: 2.89 MG/DL — HIGH (ref 0.5–1.3)
CREAT SERPL-MCNC: 3.12 MG/DL — HIGH (ref 0.5–1.3)
CREAT SERPL-MCNC: 3.21 MG/DL — HIGH (ref 0.5–1.3)
CREAT SERPL-MCNC: 3.38 MG/DL — HIGH (ref 0.5–1.3)
CRP SERPL-MCNC: 186 MG/L — HIGH (ref 0–4)
CULTURE RESULTS: ABNORMAL
CULTURE RESULTS: SIGNIFICANT CHANGE UP
D DIMER BLD IA.RAPID-MCNC: 379 NG/ML DDU — HIGH
DACRYOCYTES BLD QL SMEAR: SLIGHT — SIGNIFICANT CHANGE UP
DIFF PNL FLD: ABNORMAL
DIFF PNL FLD: NEGATIVE — SIGNIFICANT CHANGE UP
DIFF PNL FLD: NEGATIVE — SIGNIFICANT CHANGE UP
DIGOXIN SERPL-MCNC: 0.6 NG/ML — LOW (ref 0.8–2)
DIGOXIN SERPL-MCNC: 0.7 NG/ML — LOW (ref 0.8–2)
DIGOXIN SERPL-MCNC: 0.7 NG/ML — LOW (ref 0.8–2)
DIGOXIN SERPL-MCNC: 0.8 NG/ML — SIGNIFICANT CHANGE UP (ref 0.8–2)
DIGOXIN SERPL-MCNC: 0.9 NG/ML — SIGNIFICANT CHANGE UP (ref 0.8–2)
DIGOXIN SERPL-MCNC: 1 NG/ML — SIGNIFICANT CHANGE UP (ref 0.8–2)
DIGOXIN SERPL-MCNC: 1.3 NG/ML — SIGNIFICANT CHANGE UP (ref 0.8–2)
DIGOXIN SERPL-MCNC: 1.3 NG/ML — SIGNIFICANT CHANGE UP (ref 0.8–2)
EGFR: 18 ML/MIN/1.73M2 — LOW
EGFR: 19 ML/MIN/1.73M2 — LOW
EGFR: 20 ML/MIN/1.73M2 — LOW
EGFR: 22 ML/MIN/1.73M2 — LOW
EGFR: 22 ML/MIN/1.73M2 — LOW
EGFR: 23 ML/MIN/1.73M2 — LOW
EGFR: 26 ML/MIN/1.73M2 — LOW
EGFR: 30 ML/MIN/1.73M2 — LOW
EGFR: 32 ML/MIN/1.73M2 — LOW
EGFR: 33 ML/MIN/1.73M2 — LOW
EGFR: 35 ML/MIN/1.73M2 — LOW
EGFR: 37 ML/MIN/1.73M2 — LOW
EGFR: 40 ML/MIN/1.73M2 — LOW
EGFR: 40 ML/MIN/1.73M2 — LOW
EGFR: 41 ML/MIN/1.73M2 — LOW
EGFR: 41 ML/MIN/1.73M2 — LOW
EGFR: 42 ML/MIN/1.73M2 — LOW
EGFR: 42 ML/MIN/1.73M2 — LOW
EGFR: 44 ML/MIN/1.73M2 — LOW
EGFR: 46 ML/MIN/1.73M2 — LOW
EGFR: 47 ML/MIN/1.73M2 — LOW
EGFR: 48 ML/MIN/1.73M2 — LOW
EGFR: 48 ML/MIN/1.73M2 — LOW
EGFR: 50 ML/MIN/1.73M2 — LOW
EGFR: 50 ML/MIN/1.73M2 — LOW
EGFR: 51 ML/MIN/1.73M2 — LOW
EGFR: 52 ML/MIN/1.73M2 — LOW
EGFR: 53 ML/MIN/1.73M2 — LOW
EGFR: 54 ML/MIN/1.73M2 — LOW
EGFR: 55 ML/MIN/1.73M2 — LOW
EGFR: 55 ML/MIN/1.73M2 — LOW
EGFR: 56 ML/MIN/1.73M2 — LOW
EGFR: 58 ML/MIN/1.73M2 — LOW
EGFR: 59 ML/MIN/1.73M2 — LOW
EGFR: 60 ML/MIN/1.73M2 — SIGNIFICANT CHANGE UP
EGFR: 61 ML/MIN/1.73M2 — SIGNIFICANT CHANGE UP
EGFR: 63 ML/MIN/1.73M2 — SIGNIFICANT CHANGE UP
EGFR: 64 ML/MIN/1.73M2 — SIGNIFICANT CHANGE UP
EGFR: 65 ML/MIN/1.73M2 — SIGNIFICANT CHANGE UP
EGFR: 67 ML/MIN/1.73M2 — SIGNIFICANT CHANGE UP
EGFR: 68 ML/MIN/1.73M2 — SIGNIFICANT CHANGE UP
EGFR: 70 ML/MIN/1.73M2 — SIGNIFICANT CHANGE UP
EGFR: 71 ML/MIN/1.73M2 — SIGNIFICANT CHANGE UP
EGFR: 72 ML/MIN/1.73M2 — SIGNIFICANT CHANGE UP
EGFR: 74 ML/MIN/1.73M2 — SIGNIFICANT CHANGE UP
EGFR: 78 ML/MIN/1.73M2 — SIGNIFICANT CHANGE UP
EGFR: 81 ML/MIN/1.73M2 — SIGNIFICANT CHANGE UP
EGFR: 81 ML/MIN/1.73M2 — SIGNIFICANT CHANGE UP
EGFR: 86 ML/MIN/1.73M2 — SIGNIFICANT CHANGE UP
EGFR: 87 ML/MIN/1.73M2 — SIGNIFICANT CHANGE UP
EGFR: 88 ML/MIN/1.73M2 — SIGNIFICANT CHANGE UP
EGFR: 89 ML/MIN/1.73M2 — SIGNIFICANT CHANGE UP
EGFR: 90 ML/MIN/1.73M2 — SIGNIFICANT CHANGE UP
EGFR: 91 ML/MIN/1.73M2 — SIGNIFICANT CHANGE UP
EGFR: 92 ML/MIN/1.73M2 — SIGNIFICANT CHANGE UP
EGFR: 92 ML/MIN/1.73M2 — SIGNIFICANT CHANGE UP
EGFR: 93 ML/MIN/1.73M2 — SIGNIFICANT CHANGE UP
EGFR: 93 ML/MIN/1.73M2 — SIGNIFICANT CHANGE UP
EGFR: 94 ML/MIN/1.73M2 — SIGNIFICANT CHANGE UP
ELLIPTOCYTES BLD QL SMEAR: SLIGHT — SIGNIFICANT CHANGE UP
EOSINOPHIL # BLD AUTO: 0 K/UL — SIGNIFICANT CHANGE UP (ref 0–0.5)
EOSINOPHIL # BLD AUTO: 0.01 K/UL — SIGNIFICANT CHANGE UP (ref 0–0.5)
EOSINOPHIL # BLD AUTO: 0.02 K/UL — SIGNIFICANT CHANGE UP (ref 0–0.5)
EOSINOPHIL # BLD AUTO: 0.03 K/UL — SIGNIFICANT CHANGE UP (ref 0–0.5)
EOSINOPHIL # BLD AUTO: 0.04 K/UL — SIGNIFICANT CHANGE UP (ref 0–0.5)
EOSINOPHIL # BLD AUTO: 0.05 K/UL — SIGNIFICANT CHANGE UP (ref 0–0.5)
EOSINOPHIL # BLD AUTO: 0.06 K/UL — SIGNIFICANT CHANGE UP (ref 0–0.5)
EOSINOPHIL # BLD AUTO: 0.07 K/UL — SIGNIFICANT CHANGE UP (ref 0–0.5)
EOSINOPHIL # BLD AUTO: 0.08 K/UL — SIGNIFICANT CHANGE UP (ref 0–0.5)
EOSINOPHIL # BLD AUTO: 0.09 K/UL — SIGNIFICANT CHANGE UP (ref 0–0.5)
EOSINOPHIL # BLD AUTO: 0.09 K/UL — SIGNIFICANT CHANGE UP (ref 0–0.5)
EOSINOPHIL # BLD AUTO: 0.1 K/UL — SIGNIFICANT CHANGE UP (ref 0–0.5)
EOSINOPHIL # BLD AUTO: 0.1 K/UL — SIGNIFICANT CHANGE UP (ref 0–0.5)
EOSINOPHIL # BLD AUTO: 0.11 K/UL — SIGNIFICANT CHANGE UP (ref 0–0.5)
EOSINOPHIL # BLD AUTO: 0.12 K/UL — SIGNIFICANT CHANGE UP (ref 0–0.5)
EOSINOPHIL # BLD AUTO: 0.12 K/UL — SIGNIFICANT CHANGE UP (ref 0–0.5)
EOSINOPHIL NFR BLD AUTO: 0 % — SIGNIFICANT CHANGE UP (ref 0–6)
EOSINOPHIL NFR BLD AUTO: 0.1 % — SIGNIFICANT CHANGE UP (ref 0–6)
EOSINOPHIL NFR BLD AUTO: 0.2 % — SIGNIFICANT CHANGE UP (ref 0–6)
EOSINOPHIL NFR BLD AUTO: 0.3 % — SIGNIFICANT CHANGE UP (ref 0–6)
EOSINOPHIL NFR BLD AUTO: 0.4 % — SIGNIFICANT CHANGE UP (ref 0–6)
EOSINOPHIL NFR BLD AUTO: 0.5 % — SIGNIFICANT CHANGE UP (ref 0–6)
EOSINOPHIL NFR BLD AUTO: 0.6 % — SIGNIFICANT CHANGE UP (ref 0–6)
EOSINOPHIL NFR BLD AUTO: 0.7 % — SIGNIFICANT CHANGE UP (ref 0–6)
EOSINOPHIL NFR BLD AUTO: 0.7 % — SIGNIFICANT CHANGE UP (ref 0–6)
EOSINOPHIL NFR BLD AUTO: 0.8 % — SIGNIFICANT CHANGE UP (ref 0–6)
EOSINOPHIL NFR BLD AUTO: 0.9 % — SIGNIFICANT CHANGE UP (ref 0–6)
EOSINOPHIL NFR BLD AUTO: 0.9 % — SIGNIFICANT CHANGE UP (ref 0–6)
EOSINOPHIL NFR BLD AUTO: 1.2 % — SIGNIFICANT CHANGE UP (ref 0–6)
EOSINOPHIL NFR BLD AUTO: 1.2 % — SIGNIFICANT CHANGE UP (ref 0–6)
EPITH CASTS # UR COMP ASSIST: PRESENT
ERYTHROCYTE [SEDIMENTATION RATE] IN BLOOD: 89 MM/HR — HIGH (ref 0–20)
ESTIMATED AVERAGE GLUCOSE: 137 MG/DL — HIGH (ref 68–114)
FACT V ACT/NOR PPP: 122 % — SIGNIFICANT CHANGE UP (ref 50–150)
FACT VIII ACT/NOR PPP: 145 % — HIGH (ref 60–125)
FACT X ACT/NOR PPP: 82 % — SIGNIFICANT CHANGE UP (ref 70–170)
FERRITIN SERPL-MCNC: 998 NG/ML — HIGH (ref 30–400)
FIBRINOGEN AG PPP IA-MCNC: 286 MG/DL — SIGNIFICANT CHANGE UP (ref 233–496)
FIBRINOGEN AG PPP IA-MCNC: 666 MG/DL — HIGH (ref 233–496)
FIBRINOGEN PPP-MCNC: 303 MG/DL — SIGNIFICANT CHANGE UP (ref 200–445)
FINE GRAN CASTS #/AREA URNS AUTO: PRESENT
FLUAV AG NPH QL: SIGNIFICANT CHANGE UP
FLUAV AG NPH QL: SIGNIFICANT CHANGE UP
FLUBV AG NPH QL: SIGNIFICANT CHANGE UP
FLUBV AG NPH QL: SIGNIFICANT CHANGE UP
FOLATE SERPL-MCNC: 14.7 NG/ML — SIGNIFICANT CHANGE UP
FUNGITELL: 37 PG/ML — SIGNIFICANT CHANGE UP
GAS PNL BLDA: SIGNIFICANT CHANGE UP
GAS PNL BLDV: 135 MMOL/L — LOW (ref 136–145)
GAS PNL BLDV: 137 MMOL/L — SIGNIFICANT CHANGE UP (ref 136–145)
GAS PNL BLDV: 137 MMOL/L — SIGNIFICANT CHANGE UP (ref 136–145)
GAS PNL BLDV: 138 MMOL/L — SIGNIFICANT CHANGE UP (ref 136–145)
GAS PNL BLDV: 138 MMOL/L — SIGNIFICANT CHANGE UP (ref 136–145)
GAS PNL BLDV: 139 MMOL/L — SIGNIFICANT CHANGE UP (ref 136–145)
GAS PNL BLDV: 139 MMOL/L — SIGNIFICANT CHANGE UP (ref 136–145)
GAS PNL BLDV: 141 MMOL/L — SIGNIFICANT CHANGE UP (ref 136–145)
GAS PNL BLDV: 142 MMOL/L — SIGNIFICANT CHANGE UP (ref 136–145)
GAS PNL BLDV: 142 MMOL/L — SIGNIFICANT CHANGE UP (ref 136–145)
GAS PNL BLDV: 143 MMOL/L — SIGNIFICANT CHANGE UP (ref 136–145)
GAS PNL BLDV: 144 MMOL/L — SIGNIFICANT CHANGE UP (ref 136–145)
GAS PNL BLDV: 148 MMOL/L — HIGH (ref 136–145)
GAS PNL BLDV: SIGNIFICANT CHANGE UP
GASTRIN SERPL-MCNC: 5509 PG/ML — HIGH (ref 0–115)
GI PCR PANEL: ABNORMAL
GI PCR PANEL: DETECTED
GI PCR PANEL: SIGNIFICANT CHANGE UP
GIANT PLATELETS BLD QL SMEAR: PRESENT — SIGNIFICANT CHANGE UP
GLUCOSE BLDC GLUCOMTR-MCNC: 106 MG/DL — HIGH (ref 70–99)
GLUCOSE BLDC GLUCOMTR-MCNC: 107 MG/DL — HIGH (ref 70–99)
GLUCOSE BLDC GLUCOMTR-MCNC: 110 MG/DL — HIGH (ref 70–99)
GLUCOSE BLDC GLUCOMTR-MCNC: 111 MG/DL — HIGH (ref 70–99)
GLUCOSE BLDC GLUCOMTR-MCNC: 113 MG/DL — HIGH (ref 70–99)
GLUCOSE BLDC GLUCOMTR-MCNC: 114 MG/DL — HIGH (ref 70–99)
GLUCOSE BLDC GLUCOMTR-MCNC: 114 MG/DL — HIGH (ref 70–99)
GLUCOSE BLDC GLUCOMTR-MCNC: 117 MG/DL — HIGH (ref 70–99)
GLUCOSE BLDC GLUCOMTR-MCNC: 117 MG/DL — HIGH (ref 70–99)
GLUCOSE BLDC GLUCOMTR-MCNC: 118 MG/DL — HIGH (ref 70–99)
GLUCOSE BLDC GLUCOMTR-MCNC: 118 MG/DL — HIGH (ref 70–99)
GLUCOSE BLDC GLUCOMTR-MCNC: 119 MG/DL — HIGH (ref 70–99)
GLUCOSE BLDC GLUCOMTR-MCNC: 119 MG/DL — HIGH (ref 70–99)
GLUCOSE BLDC GLUCOMTR-MCNC: 120 MG/DL — HIGH (ref 70–99)
GLUCOSE BLDC GLUCOMTR-MCNC: 120 MG/DL — HIGH (ref 70–99)
GLUCOSE BLDC GLUCOMTR-MCNC: 121 MG/DL — HIGH (ref 70–99)
GLUCOSE BLDC GLUCOMTR-MCNC: 122 MG/DL — HIGH (ref 70–99)
GLUCOSE BLDC GLUCOMTR-MCNC: 123 MG/DL — HIGH (ref 70–99)
GLUCOSE BLDC GLUCOMTR-MCNC: 124 MG/DL — HIGH (ref 70–99)
GLUCOSE BLDC GLUCOMTR-MCNC: 125 MG/DL — HIGH (ref 70–99)
GLUCOSE BLDC GLUCOMTR-MCNC: 126 MG/DL — HIGH (ref 70–99)
GLUCOSE BLDC GLUCOMTR-MCNC: 128 MG/DL — HIGH (ref 70–99)
GLUCOSE BLDC GLUCOMTR-MCNC: 128 MG/DL — HIGH (ref 70–99)
GLUCOSE BLDC GLUCOMTR-MCNC: 129 MG/DL — HIGH (ref 70–99)
GLUCOSE BLDC GLUCOMTR-MCNC: 130 MG/DL — HIGH (ref 70–99)
GLUCOSE BLDC GLUCOMTR-MCNC: 131 MG/DL — HIGH (ref 70–99)
GLUCOSE BLDC GLUCOMTR-MCNC: 131 MG/DL — HIGH (ref 70–99)
GLUCOSE BLDC GLUCOMTR-MCNC: 132 MG/DL — HIGH (ref 70–99)
GLUCOSE BLDC GLUCOMTR-MCNC: 133 MG/DL — HIGH (ref 70–99)
GLUCOSE BLDC GLUCOMTR-MCNC: 134 MG/DL — HIGH (ref 70–99)
GLUCOSE BLDC GLUCOMTR-MCNC: 135 MG/DL — HIGH (ref 70–99)
GLUCOSE BLDC GLUCOMTR-MCNC: 135 MG/DL — HIGH (ref 70–99)
GLUCOSE BLDC GLUCOMTR-MCNC: 136 MG/DL — HIGH (ref 70–99)
GLUCOSE BLDC GLUCOMTR-MCNC: 137 MG/DL — HIGH (ref 70–99)
GLUCOSE BLDC GLUCOMTR-MCNC: 138 MG/DL — HIGH (ref 70–99)
GLUCOSE BLDC GLUCOMTR-MCNC: 139 MG/DL — HIGH (ref 70–99)
GLUCOSE BLDC GLUCOMTR-MCNC: 140 MG/DL — HIGH (ref 70–99)
GLUCOSE BLDC GLUCOMTR-MCNC: 141 MG/DL — HIGH (ref 70–99)
GLUCOSE BLDC GLUCOMTR-MCNC: 142 MG/DL — HIGH (ref 70–99)
GLUCOSE BLDC GLUCOMTR-MCNC: 143 MG/DL — HIGH (ref 70–99)
GLUCOSE BLDC GLUCOMTR-MCNC: 145 MG/DL — HIGH (ref 70–99)
GLUCOSE BLDC GLUCOMTR-MCNC: 145 MG/DL — HIGH (ref 70–99)
GLUCOSE BLDC GLUCOMTR-MCNC: 146 MG/DL — HIGH (ref 70–99)
GLUCOSE BLDC GLUCOMTR-MCNC: 147 MG/DL — HIGH (ref 70–99)
GLUCOSE BLDC GLUCOMTR-MCNC: 148 MG/DL — HIGH (ref 70–99)
GLUCOSE BLDC GLUCOMTR-MCNC: 148 MG/DL — HIGH (ref 70–99)
GLUCOSE BLDC GLUCOMTR-MCNC: 149 MG/DL — HIGH (ref 70–99)
GLUCOSE BLDC GLUCOMTR-MCNC: 151 MG/DL — HIGH (ref 70–99)
GLUCOSE BLDC GLUCOMTR-MCNC: 152 MG/DL — HIGH (ref 70–99)
GLUCOSE BLDC GLUCOMTR-MCNC: 154 MG/DL — HIGH (ref 70–99)
GLUCOSE BLDC GLUCOMTR-MCNC: 155 MG/DL — HIGH (ref 70–99)
GLUCOSE BLDC GLUCOMTR-MCNC: 157 MG/DL — HIGH (ref 70–99)
GLUCOSE BLDC GLUCOMTR-MCNC: 159 MG/DL — HIGH (ref 70–99)
GLUCOSE BLDC GLUCOMTR-MCNC: 159 MG/DL — HIGH (ref 70–99)
GLUCOSE BLDC GLUCOMTR-MCNC: 160 MG/DL — HIGH (ref 70–99)
GLUCOSE BLDC GLUCOMTR-MCNC: 163 MG/DL — HIGH (ref 70–99)
GLUCOSE BLDC GLUCOMTR-MCNC: 164 MG/DL — HIGH (ref 70–99)
GLUCOSE BLDC GLUCOMTR-MCNC: 165 MG/DL — HIGH (ref 70–99)
GLUCOSE BLDC GLUCOMTR-MCNC: 165 MG/DL — HIGH (ref 70–99)
GLUCOSE BLDC GLUCOMTR-MCNC: 166 MG/DL — HIGH (ref 70–99)
GLUCOSE BLDC GLUCOMTR-MCNC: 167 MG/DL — HIGH (ref 70–99)
GLUCOSE BLDC GLUCOMTR-MCNC: 167 MG/DL — HIGH (ref 70–99)
GLUCOSE BLDC GLUCOMTR-MCNC: 171 MG/DL — HIGH (ref 70–99)
GLUCOSE BLDC GLUCOMTR-MCNC: 174 MG/DL — HIGH (ref 70–99)
GLUCOSE BLDC GLUCOMTR-MCNC: 175 MG/DL — HIGH (ref 70–99)
GLUCOSE BLDC GLUCOMTR-MCNC: 175 MG/DL — HIGH (ref 70–99)
GLUCOSE BLDC GLUCOMTR-MCNC: 176 MG/DL — HIGH (ref 70–99)
GLUCOSE BLDC GLUCOMTR-MCNC: 177 MG/DL — HIGH (ref 70–99)
GLUCOSE BLDC GLUCOMTR-MCNC: 177 MG/DL — HIGH (ref 70–99)
GLUCOSE BLDC GLUCOMTR-MCNC: 178 MG/DL — HIGH (ref 70–99)
GLUCOSE BLDC GLUCOMTR-MCNC: 181 MG/DL — HIGH (ref 70–99)
GLUCOSE BLDC GLUCOMTR-MCNC: 182 MG/DL — HIGH (ref 70–99)
GLUCOSE BLDC GLUCOMTR-MCNC: 184 MG/DL — HIGH (ref 70–99)
GLUCOSE BLDC GLUCOMTR-MCNC: 184 MG/DL — HIGH (ref 70–99)
GLUCOSE BLDC GLUCOMTR-MCNC: 185 MG/DL — HIGH (ref 70–99)
GLUCOSE BLDC GLUCOMTR-MCNC: 186 MG/DL — HIGH (ref 70–99)
GLUCOSE BLDC GLUCOMTR-MCNC: 188 MG/DL — HIGH (ref 70–99)
GLUCOSE BLDC GLUCOMTR-MCNC: 188 MG/DL — HIGH (ref 70–99)
GLUCOSE BLDC GLUCOMTR-MCNC: 189 MG/DL — HIGH (ref 70–99)
GLUCOSE BLDC GLUCOMTR-MCNC: 195 MG/DL — HIGH (ref 70–99)
GLUCOSE BLDC GLUCOMTR-MCNC: 195 MG/DL — HIGH (ref 70–99)
GLUCOSE BLDC GLUCOMTR-MCNC: 196 MG/DL — HIGH (ref 70–99)
GLUCOSE BLDC GLUCOMTR-MCNC: 198 MG/DL — HIGH (ref 70–99)
GLUCOSE BLDC GLUCOMTR-MCNC: 200 MG/DL — HIGH (ref 70–99)
GLUCOSE BLDC GLUCOMTR-MCNC: 211 MG/DL — HIGH (ref 70–99)
GLUCOSE BLDC GLUCOMTR-MCNC: 223 MG/DL — HIGH (ref 70–99)
GLUCOSE BLDC GLUCOMTR-MCNC: 246 MG/DL — HIGH (ref 70–99)
GLUCOSE BLDC GLUCOMTR-MCNC: 283 MG/DL — HIGH (ref 70–99)
GLUCOSE BLDC GLUCOMTR-MCNC: 317 MG/DL — HIGH (ref 70–99)
GLUCOSE BLDC GLUCOMTR-MCNC: 348 MG/DL — HIGH (ref 70–99)
GLUCOSE BLDV-MCNC: 103 MG/DL — HIGH (ref 70–99)
GLUCOSE BLDV-MCNC: 115 MG/DL — HIGH (ref 70–99)
GLUCOSE BLDV-MCNC: 134 MG/DL — HIGH (ref 70–99)
GLUCOSE BLDV-MCNC: 138 MG/DL — HIGH (ref 70–99)
GLUCOSE BLDV-MCNC: 146 MG/DL — HIGH (ref 70–99)
GLUCOSE BLDV-MCNC: 156 MG/DL — HIGH (ref 70–99)
GLUCOSE BLDV-MCNC: 161 MG/DL — HIGH (ref 70–99)
GLUCOSE BLDV-MCNC: 162 MG/DL — HIGH (ref 70–99)
GLUCOSE BLDV-MCNC: 175 MG/DL — HIGH (ref 70–99)
GLUCOSE BLDV-MCNC: 179 MG/DL — HIGH (ref 70–99)
GLUCOSE BLDV-MCNC: 183 MG/DL — HIGH (ref 70–99)
GLUCOSE BLDV-MCNC: 186 MG/DL — HIGH (ref 70–99)
GLUCOSE BLDV-MCNC: 200 MG/DL — HIGH (ref 70–99)
GLUCOSE BLDV-MCNC: 278 MG/DL — HIGH (ref 70–99)
GLUCOSE BLDV-MCNC: 97 MG/DL — SIGNIFICANT CHANGE UP (ref 70–99)
GLUCOSE SERPL-MCNC: 106 MG/DL — HIGH (ref 70–99)
GLUCOSE SERPL-MCNC: 107 MG/DL — HIGH (ref 70–99)
GLUCOSE SERPL-MCNC: 109 MG/DL — HIGH (ref 70–99)
GLUCOSE SERPL-MCNC: 109 MG/DL — HIGH (ref 70–99)
GLUCOSE SERPL-MCNC: 110 MG/DL — HIGH (ref 70–99)
GLUCOSE SERPL-MCNC: 112 MG/DL — HIGH (ref 70–99)
GLUCOSE SERPL-MCNC: 113 MG/DL — HIGH (ref 70–99)
GLUCOSE SERPL-MCNC: 116 MG/DL — HIGH (ref 70–99)
GLUCOSE SERPL-MCNC: 117 MG/DL — HIGH (ref 70–99)
GLUCOSE SERPL-MCNC: 118 MG/DL — HIGH (ref 70–99)
GLUCOSE SERPL-MCNC: 118 MG/DL — HIGH (ref 70–99)
GLUCOSE SERPL-MCNC: 119 MG/DL — HIGH (ref 70–99)
GLUCOSE SERPL-MCNC: 120 MG/DL — HIGH (ref 70–99)
GLUCOSE SERPL-MCNC: 121 MG/DL — HIGH (ref 70–99)
GLUCOSE SERPL-MCNC: 122 MG/DL — HIGH (ref 70–99)
GLUCOSE SERPL-MCNC: 122 MG/DL — HIGH (ref 70–99)
GLUCOSE SERPL-MCNC: 124 MG/DL — HIGH (ref 70–99)
GLUCOSE SERPL-MCNC: 124 MG/DL — HIGH (ref 70–99)
GLUCOSE SERPL-MCNC: 125 MG/DL — HIGH (ref 70–99)
GLUCOSE SERPL-MCNC: 126 MG/DL — HIGH (ref 70–99)
GLUCOSE SERPL-MCNC: 126 MG/DL — HIGH (ref 70–99)
GLUCOSE SERPL-MCNC: 128 MG/DL — HIGH (ref 70–99)
GLUCOSE SERPL-MCNC: 129 MG/DL — HIGH (ref 70–99)
GLUCOSE SERPL-MCNC: 133 MG/DL — HIGH (ref 70–99)
GLUCOSE SERPL-MCNC: 134 MG/DL — HIGH (ref 70–99)
GLUCOSE SERPL-MCNC: 134 MG/DL — HIGH (ref 70–99)
GLUCOSE SERPL-MCNC: 136 MG/DL — HIGH (ref 70–99)
GLUCOSE SERPL-MCNC: 136 MG/DL — HIGH (ref 70–99)
GLUCOSE SERPL-MCNC: 137 MG/DL — HIGH (ref 70–99)
GLUCOSE SERPL-MCNC: 138 MG/DL — HIGH (ref 70–99)
GLUCOSE SERPL-MCNC: 140 MG/DL — HIGH (ref 70–99)
GLUCOSE SERPL-MCNC: 140 MG/DL — HIGH (ref 70–99)
GLUCOSE SERPL-MCNC: 143 MG/DL — HIGH (ref 70–99)
GLUCOSE SERPL-MCNC: 146 MG/DL — HIGH (ref 70–99)
GLUCOSE SERPL-MCNC: 147 MG/DL — HIGH (ref 70–99)
GLUCOSE SERPL-MCNC: 148 MG/DL — HIGH (ref 70–99)
GLUCOSE SERPL-MCNC: 151 MG/DL — HIGH (ref 70–99)
GLUCOSE SERPL-MCNC: 152 MG/DL — HIGH (ref 70–99)
GLUCOSE SERPL-MCNC: 155 MG/DL — HIGH (ref 70–99)
GLUCOSE SERPL-MCNC: 158 MG/DL — HIGH (ref 70–99)
GLUCOSE SERPL-MCNC: 160 MG/DL — HIGH (ref 70–99)
GLUCOSE SERPL-MCNC: 163 MG/DL — HIGH (ref 70–99)
GLUCOSE SERPL-MCNC: 163 MG/DL — HIGH (ref 70–99)
GLUCOSE SERPL-MCNC: 165 MG/DL — HIGH (ref 70–99)
GLUCOSE SERPL-MCNC: 166 MG/DL — HIGH (ref 70–99)
GLUCOSE SERPL-MCNC: 167 MG/DL — HIGH (ref 70–99)
GLUCOSE SERPL-MCNC: 168 MG/DL — HIGH (ref 70–99)
GLUCOSE SERPL-MCNC: 169 MG/DL — HIGH (ref 70–99)
GLUCOSE SERPL-MCNC: 169 MG/DL — HIGH (ref 70–99)
GLUCOSE SERPL-MCNC: 172 MG/DL — HIGH (ref 70–99)
GLUCOSE SERPL-MCNC: 173 MG/DL — HIGH (ref 70–99)
GLUCOSE SERPL-MCNC: 176 MG/DL — HIGH (ref 70–99)
GLUCOSE SERPL-MCNC: 177 MG/DL — HIGH (ref 70–99)
GLUCOSE SERPL-MCNC: 179 MG/DL — HIGH (ref 70–99)
GLUCOSE SERPL-MCNC: 179 MG/DL — HIGH (ref 70–99)
GLUCOSE SERPL-MCNC: 180 MG/DL — HIGH (ref 70–99)
GLUCOSE SERPL-MCNC: 181 MG/DL — HIGH (ref 70–99)
GLUCOSE SERPL-MCNC: 182 MG/DL — HIGH (ref 70–99)
GLUCOSE SERPL-MCNC: 184 MG/DL — HIGH (ref 70–99)
GLUCOSE SERPL-MCNC: 185 MG/DL — HIGH (ref 70–99)
GLUCOSE SERPL-MCNC: 186 MG/DL — HIGH (ref 70–99)
GLUCOSE SERPL-MCNC: 187 MG/DL — HIGH (ref 70–99)
GLUCOSE SERPL-MCNC: 187 MG/DL — HIGH (ref 70–99)
GLUCOSE SERPL-MCNC: 190 MG/DL — HIGH (ref 70–99)
GLUCOSE SERPL-MCNC: 191 MG/DL — HIGH (ref 70–99)
GLUCOSE SERPL-MCNC: 196 MG/DL — HIGH (ref 70–99)
GLUCOSE SERPL-MCNC: 198 MG/DL — HIGH (ref 70–99)
GLUCOSE SERPL-MCNC: 199 MG/DL — HIGH (ref 70–99)
GLUCOSE SERPL-MCNC: 200 MG/DL — HIGH (ref 70–99)
GLUCOSE SERPL-MCNC: 208 MG/DL — HIGH (ref 70–99)
GLUCOSE SERPL-MCNC: 229 MG/DL — HIGH (ref 70–99)
GLUCOSE SERPL-MCNC: 230 MG/DL — HIGH (ref 70–99)
GLUCOSE SERPL-MCNC: 235 MG/DL — HIGH (ref 70–99)
GLUCOSE SERPL-MCNC: 237 MG/DL — HIGH (ref 70–99)
GLUCOSE SERPL-MCNC: 267 MG/DL — HIGH (ref 70–99)
GLUCOSE SERPL-MCNC: 267 MG/DL — HIGH (ref 70–99)
GLUCOSE SERPL-MCNC: 268 MG/DL — HIGH (ref 70–99)
GLUCOSE SERPL-MCNC: 273 MG/DL — HIGH (ref 70–99)
GLUCOSE SERPL-MCNC: 301 MG/DL — HIGH (ref 70–99)
GLUCOSE SERPL-MCNC: 311 MG/DL — HIGH (ref 70–99)
GLUCOSE SERPL-MCNC: 80 MG/DL — SIGNIFICANT CHANGE UP (ref 70–99)
GLUCOSE UR QL: >=1000 MG/DL
GLUCOSE UR QL: >=1000 MG/DL
GLUCOSE UR QL: NEGATIVE MG/DL — SIGNIFICANT CHANGE UP
GRAM STN FLD: ABNORMAL
H PYLORI AG STL QL: NEGATIVE — SIGNIFICANT CHANGE UP
HAPTOGLOB SERPL-MCNC: 143 MG/DL — SIGNIFICANT CHANGE UP (ref 34–200)
HAPTOGLOB SERPL-MCNC: 207 MG/DL — HIGH (ref 34–200)
HAPTOGLOB SERPL-MCNC: 45 MG/DL — SIGNIFICANT CHANGE UP (ref 34–200)
HAPTOGLOB SERPL-MCNC: 63 MG/DL — SIGNIFICANT CHANGE UP (ref 34–200)
HCO3 BLDV-SCNC: 16 MMOL/L — LOW (ref 22–29)
HCO3 BLDV-SCNC: 17 MMOL/L — LOW (ref 22–29)
HCO3 BLDV-SCNC: 17 MMOL/L — LOW (ref 22–29)
HCO3 BLDV-SCNC: 18 MMOL/L — LOW (ref 22–29)
HCO3 BLDV-SCNC: 18 MMOL/L — LOW (ref 22–29)
HCO3 BLDV-SCNC: 19 MMOL/L — LOW (ref 22–29)
HCO3 BLDV-SCNC: 22 MMOL/L — SIGNIFICANT CHANGE UP (ref 22–29)
HCO3 BLDV-SCNC: 25 MMOL/L — SIGNIFICANT CHANGE UP (ref 22–29)
HCO3 BLDV-SCNC: 26 MMOL/L — SIGNIFICANT CHANGE UP (ref 22–29)
HCO3 BLDV-SCNC: 27 MMOL/L — SIGNIFICANT CHANGE UP (ref 22–29)
HCT VFR BLD CALC: 19.2 % — CRITICAL LOW (ref 39–50)
HCT VFR BLD CALC: 20.2 % — CRITICAL LOW (ref 39–50)
HCT VFR BLD CALC: 20.4 % — CRITICAL LOW (ref 39–50)
HCT VFR BLD CALC: 21.2 % — LOW (ref 39–50)
HCT VFR BLD CALC: 22 % — LOW (ref 39–50)
HCT VFR BLD CALC: 22.8 % — LOW (ref 39–50)
HCT VFR BLD CALC: 23 % — LOW (ref 39–50)
HCT VFR BLD CALC: 23.2 % — LOW (ref 39–50)
HCT VFR BLD CALC: 23.3 % — LOW (ref 39–50)
HCT VFR BLD CALC: 23.3 % — LOW (ref 39–50)
HCT VFR BLD CALC: 23.5 % — LOW (ref 39–50)
HCT VFR BLD CALC: 23.7 % — LOW (ref 39–50)
HCT VFR BLD CALC: 23.7 % — LOW (ref 39–50)
HCT VFR BLD CALC: 23.8 % — LOW (ref 39–50)
HCT VFR BLD CALC: 23.9 % — LOW (ref 39–50)
HCT VFR BLD CALC: 23.9 % — LOW (ref 39–50)
HCT VFR BLD CALC: 24 % — LOW (ref 39–50)
HCT VFR BLD CALC: 24.1 % — LOW (ref 39–50)
HCT VFR BLD CALC: 24.3 % — LOW (ref 39–50)
HCT VFR BLD CALC: 24.3 % — LOW (ref 39–50)
HCT VFR BLD CALC: 24.4 % — LOW (ref 39–50)
HCT VFR BLD CALC: 24.8 % — LOW (ref 39–50)
HCT VFR BLD CALC: 24.9 % — LOW (ref 39–50)
HCT VFR BLD CALC: 25.2 % — LOW (ref 39–50)
HCT VFR BLD CALC: 25.4 % — LOW (ref 39–50)
HCT VFR BLD CALC: 25.7 % — LOW (ref 39–50)
HCT VFR BLD CALC: 25.8 % — LOW (ref 39–50)
HCT VFR BLD CALC: 25.9 % — LOW (ref 39–50)
HCT VFR BLD CALC: 26 % — LOW (ref 39–50)
HCT VFR BLD CALC: 26.1 % — LOW (ref 39–50)
HCT VFR BLD CALC: 26.3 % — LOW (ref 39–50)
HCT VFR BLD CALC: 26.3 % — LOW (ref 39–50)
HCT VFR BLD CALC: 26.4 % — LOW (ref 39–50)
HCT VFR BLD CALC: 26.6 % — LOW (ref 39–50)
HCT VFR BLD CALC: 26.6 % — LOW (ref 39–50)
HCT VFR BLD CALC: 26.7 % — LOW (ref 39–50)
HCT VFR BLD CALC: 26.8 % — LOW (ref 39–50)
HCT VFR BLD CALC: 26.9 % — LOW (ref 39–50)
HCT VFR BLD CALC: 27 % — LOW (ref 39–50)
HCT VFR BLD CALC: 27.2 % — LOW (ref 39–50)
HCT VFR BLD CALC: 27.3 % — LOW (ref 39–50)
HCT VFR BLD CALC: 27.4 % — LOW (ref 39–50)
HCT VFR BLD CALC: 27.5 % — LOW (ref 39–50)
HCT VFR BLD CALC: 27.5 % — LOW (ref 39–50)
HCT VFR BLD CALC: 27.6 % — LOW (ref 39–50)
HCT VFR BLD CALC: 28 % — LOW (ref 39–50)
HCT VFR BLD CALC: 28.1 % — LOW (ref 39–50)
HCT VFR BLD CALC: 28.1 % — LOW (ref 39–50)
HCT VFR BLD CALC: 28.3 % — LOW (ref 39–50)
HCT VFR BLD CALC: 28.4 % — LOW (ref 39–50)
HCT VFR BLD CALC: 28.4 % — LOW (ref 39–50)
HCT VFR BLD CALC: 28.7 % — LOW (ref 39–50)
HCT VFR BLD CALC: 28.8 % — LOW (ref 39–50)
HCT VFR BLD CALC: 28.9 % — LOW (ref 39–50)
HCT VFR BLD CALC: 28.9 % — LOW (ref 39–50)
HCT VFR BLD CALC: 29.1 % — LOW (ref 39–50)
HCT VFR BLD CALC: 29.2 % — LOW (ref 39–50)
HCT VFR BLD CALC: 29.5 % — LOW (ref 39–50)
HCT VFR BLD CALC: 29.5 % — LOW (ref 39–50)
HCT VFR BLD CALC: 29.6 % — LOW (ref 39–50)
HCT VFR BLD CALC: 29.8 % — LOW (ref 39–50)
HCT VFR BLD CALC: 29.9 % — LOW (ref 39–50)
HCT VFR BLD CALC: 30 % — LOW (ref 39–50)
HCT VFR BLD CALC: 30.2 % — LOW (ref 39–50)
HCT VFR BLD CALC: 30.6 % — LOW (ref 39–50)
HCT VFR BLD CALC: 30.7 % — LOW (ref 39–50)
HCT VFR BLD CALC: 30.8 % — LOW (ref 39–50)
HCT VFR BLD CALC: 30.8 % — LOW (ref 39–50)
HCT VFR BLD CALC: 31.3 % — LOW (ref 39–50)
HCT VFR BLD CALC: 31.3 % — LOW (ref 39–50)
HCT VFR BLD CALC: 31.4 % — LOW (ref 39–50)
HCT VFR BLD CALC: 31.5 % — LOW (ref 39–50)
HCT VFR BLD CALC: 31.8 % — LOW (ref 39–50)
HCT VFR BLD CALC: 32.1 % — LOW (ref 39–50)
HCT VFR BLD CALC: 32.1 % — LOW (ref 39–50)
HCT VFR BLD CALC: 32.5 % — LOW (ref 39–50)
HCT VFR BLD CALC: 33 % — LOW (ref 39–50)
HCT VFR BLD CALC: 33.1 % — LOW (ref 39–50)
HCT VFR BLD CALC: 33.2 % — LOW (ref 39–50)
HCT VFR BLD CALC: 33.5 % — LOW (ref 39–50)
HCT VFR BLD CALC: 33.7 % — LOW (ref 39–50)
HCT VFR BLD CALC: 33.8 % — LOW (ref 39–50)
HCT VFR BLD CALC: 33.8 % — LOW (ref 39–50)
HCT VFR BLD CALC: 34 % — LOW (ref 39–50)
HCT VFR BLD CALC: 34.1 % — LOW (ref 39–50)
HCT VFR BLD CALC: 34.3 % — LOW (ref 39–50)
HCT VFR BLD CALC: 34.4 % — LOW (ref 39–50)
HCT VFR BLD CALC: 35.2 % — LOW (ref 39–50)
HCT VFR BLD CALC: 35.3 % — LOW (ref 39–50)
HCT VFR BLD CALC: 35.6 % — LOW (ref 39–50)
HCT VFR BLD CALC: 36.3 % — LOW (ref 39–50)
HCT VFR BLD CALC: 37 % — LOW (ref 39–50)
HCT VFR BLD CALC: 38.4 % — LOW (ref 39–50)
HCT VFR BLD CALC: 39.7 % — SIGNIFICANT CHANGE UP (ref 39–50)
HCT VFR BLD CALC: 40.7 % — SIGNIFICANT CHANGE UP (ref 39–50)
HCT VFR BLD CALC: 41.1 % — SIGNIFICANT CHANGE UP (ref 39–50)
HCT VFR BLD CALC: 41.8 % — SIGNIFICANT CHANGE UP (ref 39–50)
HCT VFR BLD CALC: 43.3 % — SIGNIFICANT CHANGE UP (ref 39–50)
HCT VFR BLDA CALC: 17 % — CRITICAL LOW (ref 39–51)
HCT VFR BLDA CALC: 22 % — LOW (ref 39–51)
HCT VFR BLDA CALC: 24 % — LOW (ref 39–51)
HCT VFR BLDA CALC: 24 % — LOW (ref 39–51)
HCT VFR BLDA CALC: 26 % — LOW (ref 39–51)
HCT VFR BLDA CALC: 27 % — LOW (ref 39–51)
HCT VFR BLDA CALC: 28 % — LOW (ref 39–51)
HCT VFR BLDA CALC: 28 % — LOW (ref 39–51)
HCT VFR BLDA CALC: 29 % — LOW (ref 39–51)
HCT VFR BLDA CALC: 30 % — LOW (ref 39–51)
HCT VFR BLDA CALC: 31 % — LOW (ref 39–51)
HCT VFR BLDA CALC: 32 % — LOW (ref 39–51)
HCT VFR BLDA CALC: 34 % — LOW (ref 39–51)
HCT VFR BLDA CALC: 35 % — LOW (ref 39–51)
HCT VFR BLDA CALC: 43 % — SIGNIFICANT CHANGE UP (ref 39–51)
HEPARINASE TEG R TIME: 10.3 MIN — HIGH (ref 4.3–8.3)
HGB BLD CALC-MCNC: 10 G/DL — LOW (ref 12.6–17.4)
HGB BLD CALC-MCNC: 10.3 G/DL — LOW (ref 12.6–17.4)
HGB BLD CALC-MCNC: 10.6 G/DL — LOW (ref 12.6–17.4)
HGB BLD CALC-MCNC: 11.2 G/DL — LOW (ref 12.6–17.4)
HGB BLD CALC-MCNC: 11.7 G/DL — LOW (ref 12.6–17.4)
HGB BLD CALC-MCNC: 14.4 G/DL — SIGNIFICANT CHANGE UP (ref 12.6–17.4)
HGB BLD CALC-MCNC: 5.5 G/DL — CRITICAL LOW (ref 12.6–17.4)
HGB BLD CALC-MCNC: 7.2 G/DL — LOW (ref 12.6–17.4)
HGB BLD CALC-MCNC: 8 G/DL — LOW (ref 12.6–17.4)
HGB BLD CALC-MCNC: 8 G/DL — LOW (ref 12.6–17.4)
HGB BLD CALC-MCNC: 8.6 G/DL — LOW (ref 12.6–17.4)
HGB BLD CALC-MCNC: 8.9 G/DL — LOW (ref 12.6–17.4)
HGB BLD CALC-MCNC: 9.4 G/DL — LOW (ref 12.6–17.4)
HGB BLD CALC-MCNC: 9.4 G/DL — LOW (ref 12.6–17.4)
HGB BLD CALC-MCNC: 9.6 G/DL — LOW (ref 12.6–17.4)
HGB BLD-MCNC: 10 G/DL — LOW (ref 13–17)
HGB BLD-MCNC: 10 G/DL — LOW (ref 13–17)
HGB BLD-MCNC: 10.1 G/DL — LOW (ref 13–17)
HGB BLD-MCNC: 10.2 G/DL — LOW (ref 13–17)
HGB BLD-MCNC: 10.3 G/DL — LOW (ref 13–17)
HGB BLD-MCNC: 10.4 G/DL — LOW (ref 13–17)
HGB BLD-MCNC: 10.5 G/DL — LOW (ref 13–17)
HGB BLD-MCNC: 10.6 G/DL — LOW (ref 13–17)
HGB BLD-MCNC: 10.8 G/DL — LOW (ref 13–17)
HGB BLD-MCNC: 10.8 G/DL — LOW (ref 13–17)
HGB BLD-MCNC: 10.9 G/DL — LOW (ref 13–17)
HGB BLD-MCNC: 11 G/DL — LOW (ref 13–17)
HGB BLD-MCNC: 11.1 G/DL — LOW (ref 13–17)
HGB BLD-MCNC: 11.1 G/DL — LOW (ref 13–17)
HGB BLD-MCNC: 11.2 G/DL — LOW (ref 13–17)
HGB BLD-MCNC: 11.4 G/DL — LOW (ref 13–17)
HGB BLD-MCNC: 11.5 G/DL — LOW (ref 13–17)
HGB BLD-MCNC: 11.7 G/DL — LOW (ref 13–17)
HGB BLD-MCNC: 11.8 G/DL — LOW (ref 13–17)
HGB BLD-MCNC: 11.8 G/DL — LOW (ref 13–17)
HGB BLD-MCNC: 11.9 G/DL — LOW (ref 13–17)
HGB BLD-MCNC: 12 G/DL — LOW (ref 13–17)
HGB BLD-MCNC: 12.5 G/DL — LOW (ref 13–17)
HGB BLD-MCNC: 12.5 G/DL — LOW (ref 13–17)
HGB BLD-MCNC: 13 G/DL — SIGNIFICANT CHANGE UP (ref 13–17)
HGB BLD-MCNC: 13 G/DL — SIGNIFICANT CHANGE UP (ref 13–17)
HGB BLD-MCNC: 13.3 G/DL — SIGNIFICANT CHANGE UP (ref 13–17)
HGB BLD-MCNC: 13.6 G/DL — SIGNIFICANT CHANGE UP (ref 13–17)
HGB BLD-MCNC: 13.9 G/DL — SIGNIFICANT CHANGE UP (ref 13–17)
HGB BLD-MCNC: 14.4 G/DL — SIGNIFICANT CHANGE UP (ref 13–17)
HGB BLD-MCNC: 14.5 G/DL — SIGNIFICANT CHANGE UP (ref 13–17)
HGB BLD-MCNC: 6.3 G/DL — CRITICAL LOW (ref 13–17)
HGB BLD-MCNC: 6.5 G/DL — CRITICAL LOW (ref 13–17)
HGB BLD-MCNC: 6.6 G/DL — CRITICAL LOW (ref 13–17)
HGB BLD-MCNC: 6.9 G/DL — CRITICAL LOW (ref 13–17)
HGB BLD-MCNC: 7.1 G/DL — LOW (ref 13–17)
HGB BLD-MCNC: 7.3 G/DL — LOW (ref 13–17)
HGB BLD-MCNC: 7.4 G/DL — LOW (ref 13–17)
HGB BLD-MCNC: 7.4 G/DL — LOW (ref 13–17)
HGB BLD-MCNC: 7.6 G/DL — LOW (ref 13–17)
HGB BLD-MCNC: 7.7 G/DL — LOW (ref 13–17)
HGB BLD-MCNC: 7.8 G/DL — LOW (ref 13–17)
HGB BLD-MCNC: 7.9 G/DL — LOW (ref 13–17)
HGB BLD-MCNC: 8 G/DL — LOW (ref 13–17)
HGB BLD-MCNC: 8.1 G/DL — LOW (ref 13–17)
HGB BLD-MCNC: 8.1 G/DL — LOW (ref 13–17)
HGB BLD-MCNC: 8.2 G/DL — LOW (ref 13–17)
HGB BLD-MCNC: 8.2 G/DL — LOW (ref 13–17)
HGB BLD-MCNC: 8.3 G/DL — LOW (ref 13–17)
HGB BLD-MCNC: 8.3 G/DL — LOW (ref 13–17)
HGB BLD-MCNC: 8.4 G/DL — LOW (ref 13–17)
HGB BLD-MCNC: 8.5 G/DL — LOW (ref 13–17)
HGB BLD-MCNC: 8.5 G/DL — LOW (ref 13–17)
HGB BLD-MCNC: 8.6 G/DL — LOW (ref 13–17)
HGB BLD-MCNC: 8.7 G/DL — LOW (ref 13–17)
HGB BLD-MCNC: 8.9 G/DL — LOW (ref 13–17)
HGB BLD-MCNC: 9 G/DL — LOW (ref 13–17)
HGB BLD-MCNC: 9.1 G/DL — LOW (ref 13–17)
HGB BLD-MCNC: 9.2 G/DL — LOW (ref 13–17)
HGB BLD-MCNC: 9.3 G/DL — LOW (ref 13–17)
HGB BLD-MCNC: 9.4 G/DL — LOW (ref 13–17)
HGB BLD-MCNC: 9.5 G/DL — LOW (ref 13–17)
HGB BLD-MCNC: 9.5 G/DL — LOW (ref 13–17)
HOROWITZ INDEX BLDV+IHG-RTO: 100 — SIGNIFICANT CHANGE UP
HOROWITZ INDEX BLDV+IHG-RTO: 21 — SIGNIFICANT CHANGE UP
HOROWITZ INDEX BLDV+IHG-RTO: 28 — SIGNIFICANT CHANGE UP
HOROWITZ INDEX BLDV+IHG-RTO: 28 — SIGNIFICANT CHANGE UP
HYALINE CASTS # UR AUTO: PRESENT
IMM GRANULOCYTES NFR BLD AUTO: 0.4 % — SIGNIFICANT CHANGE UP (ref 0–0.9)
IMM GRANULOCYTES NFR BLD AUTO: 0.5 % — SIGNIFICANT CHANGE UP (ref 0–0.9)
IMM GRANULOCYTES NFR BLD AUTO: 0.6 % — SIGNIFICANT CHANGE UP (ref 0–0.9)
IMM GRANULOCYTES NFR BLD AUTO: 0.7 % — SIGNIFICANT CHANGE UP (ref 0–0.9)
IMM GRANULOCYTES NFR BLD AUTO: 0.8 % — SIGNIFICANT CHANGE UP (ref 0–0.9)
IMM GRANULOCYTES NFR BLD AUTO: 0.9 % — SIGNIFICANT CHANGE UP (ref 0–0.9)
IMM GRANULOCYTES NFR BLD AUTO: 1 % — HIGH (ref 0–0.9)
IMM GRANULOCYTES NFR BLD AUTO: 1.1 % — HIGH (ref 0–0.9)
IMM GRANULOCYTES NFR BLD AUTO: 1.2 % — HIGH (ref 0–0.9)
IMM GRANULOCYTES NFR BLD AUTO: 1.2 % — HIGH (ref 0–0.9)
IMM GRANULOCYTES NFR BLD AUTO: 1.3 % — HIGH (ref 0–0.9)
IMM GRANULOCYTES NFR BLD AUTO: 1.4 % — HIGH (ref 0–0.9)
IMM GRANULOCYTES NFR BLD AUTO: 1.4 % — HIGH (ref 0–0.9)
IMM GRANULOCYTES NFR BLD AUTO: 1.5 % — HIGH (ref 0–0.9)
IMM GRANULOCYTES NFR BLD AUTO: 1.6 % — HIGH (ref 0–0.9)
IMM GRANULOCYTES NFR BLD AUTO: 1.7 % — HIGH (ref 0–0.9)
IMM GRANULOCYTES NFR BLD AUTO: 1.7 % — HIGH (ref 0–0.9)
IMM GRANULOCYTES NFR BLD AUTO: 1.8 % — HIGH (ref 0–0.9)
IMM GRANULOCYTES NFR BLD AUTO: 1.9 % — HIGH (ref 0–0.9)
IMM GRANULOCYTES NFR BLD AUTO: 2 % — HIGH (ref 0–0.9)
IMM GRANULOCYTES NFR BLD AUTO: 2.1 % — HIGH (ref 0–0.9)
IMM GRANULOCYTES NFR BLD AUTO: 2.4 % — HIGH (ref 0–0.9)
IMM GRANULOCYTES NFR BLD AUTO: 2.7 % — HIGH (ref 0–0.9)
INR BLD: 0.98 RATIO — SIGNIFICANT CHANGE UP (ref 0.85–1.18)
INR BLD: 0.98 RATIO — SIGNIFICANT CHANGE UP (ref 0.85–1.18)
INR BLD: 1.01 RATIO — SIGNIFICANT CHANGE UP (ref 0.85–1.18)
INR BLD: 1.03 RATIO — SIGNIFICANT CHANGE UP (ref 0.85–1.18)
INR BLD: 1.03 RATIO — SIGNIFICANT CHANGE UP (ref 0.85–1.18)
INR BLD: 1.04 RATIO — SIGNIFICANT CHANGE UP (ref 0.85–1.18)
INR BLD: 1.06 RATIO — SIGNIFICANT CHANGE UP (ref 0.85–1.18)
INR BLD: 1.07 RATIO — SIGNIFICANT CHANGE UP (ref 0.85–1.18)
INR BLD: 1.09 RATIO — SIGNIFICANT CHANGE UP (ref 0.85–1.18)
INR BLD: 1.1 RATIO — SIGNIFICANT CHANGE UP (ref 0.85–1.18)
INR BLD: 1.1 RATIO — SIGNIFICANT CHANGE UP (ref 0.85–1.18)
INR BLD: 1.11 RATIO — SIGNIFICANT CHANGE UP (ref 0.85–1.18)
INR BLD: 1.12 RATIO — SIGNIFICANT CHANGE UP (ref 0.85–1.18)
INR BLD: 1.13 RATIO — SIGNIFICANT CHANGE UP (ref 0.85–1.18)
INR BLD: 1.14 RATIO — SIGNIFICANT CHANGE UP (ref 0.85–1.18)
INR BLD: 1.14 RATIO — SIGNIFICANT CHANGE UP (ref 0.85–1.18)
INR BLD: 1.15 RATIO — SIGNIFICANT CHANGE UP (ref 0.85–1.18)
INR BLD: 1.16 RATIO — SIGNIFICANT CHANGE UP (ref 0.85–1.18)
INR BLD: 1.17 RATIO — SIGNIFICANT CHANGE UP (ref 0.85–1.18)
INR BLD: 1.19 RATIO — HIGH (ref 0.85–1.18)
INR BLD: 1.22 RATIO — HIGH (ref 0.85–1.18)
INR BLD: 1.23 RATIO — HIGH (ref 0.85–1.18)
INR BLD: 1.29 RATIO — HIGH (ref 0.85–1.18)
INR BLD: 1.33 RATIO — HIGH (ref 0.85–1.18)
INR BLD: 1.34 RATIO — HIGH (ref 0.85–1.18)
INR BLD: 1.37 RATIO — HIGH (ref 0.85–1.18)
INR BLD: 1.54 RATIO — HIGH (ref 0.85–1.18)
INR BLD: 1.57 RATIO — HIGH (ref 0.85–1.18)
INR BLD: 1.57 RATIO — HIGH (ref 0.85–1.18)
INR BLD: 1.64 RATIO — HIGH (ref 0.85–1.18)
INR BLD: 2.12 RATIO — HIGH (ref 0.85–1.18)
IRON SATN MFR SERPL: 65 % — HIGH (ref 16–55)
IRON SATN MFR SERPL: 89 UG/DL — SIGNIFICANT CHANGE UP (ref 45–165)
KETONES UR-MCNC: ABNORMAL MG/DL
KETONES UR-MCNC: ABNORMAL MG/DL
KETONES UR-MCNC: NEGATIVE MG/DL — SIGNIFICANT CHANGE UP
LACTATE BLDV-MCNC: 0.8 MMOL/L — SIGNIFICANT CHANGE UP (ref 0.5–2)
LACTATE BLDV-MCNC: 0.8 MMOL/L — SIGNIFICANT CHANGE UP (ref 0.5–2)
LACTATE BLDV-MCNC: 1 MMOL/L — SIGNIFICANT CHANGE UP (ref 0.5–2)
LACTATE BLDV-MCNC: 1.1 MMOL/L — SIGNIFICANT CHANGE UP (ref 0.5–2)
LACTATE BLDV-MCNC: 1.2 MMOL/L — SIGNIFICANT CHANGE UP (ref 0.5–2)
LACTATE BLDV-MCNC: 1.3 MMOL/L — SIGNIFICANT CHANGE UP (ref 0.5–2)
LACTATE BLDV-MCNC: 1.4 MMOL/L — SIGNIFICANT CHANGE UP (ref 0.5–2)
LACTATE BLDV-MCNC: 1.7 MMOL/L — SIGNIFICANT CHANGE UP (ref 0.5–2)
LACTATE BLDV-MCNC: 1.9 MMOL/L — SIGNIFICANT CHANGE UP (ref 0.5–2)
LACTATE BLDV-MCNC: 1.9 MMOL/L — SIGNIFICANT CHANGE UP (ref 0.5–2)
LACTATE BLDV-MCNC: 2 MMOL/L — SIGNIFICANT CHANGE UP (ref 0.5–2)
LACTATE BLDV-MCNC: 2.1 MMOL/L — HIGH (ref 0.5–2)
LACTATE BLDV-MCNC: 2.4 MMOL/L — HIGH (ref 0.5–2)
LACTATE BLDV-MCNC: 3.1 MMOL/L — HIGH (ref 0.5–2)
LACTATE BLDV-MCNC: 3.2 MMOL/L — HIGH (ref 0.5–2)
LACTATE SERPL-SCNC: 1 MMOL/L — SIGNIFICANT CHANGE UP (ref 0.5–2)
LDH SERPL L TO P-CCNC: 271 U/L — HIGH (ref 50–242)
LDH SERPL L TO P-CCNC: 321 U/L — HIGH (ref 50–242)
LDH SERPL L TO P-CCNC: 755 U/L — HIGH (ref 50–242)
LEUKOCYTE ESTERASE UR-ACNC: ABNORMAL
LEUKOCYTE ESTERASE UR-ACNC: ABNORMAL
LEUKOCYTE ESTERASE UR-ACNC: NEGATIVE — SIGNIFICANT CHANGE UP
LYMPHOCYTES # BLD AUTO: 0 % — LOW (ref 13–44)
LYMPHOCYTES # BLD AUTO: 0 K/UL — LOW (ref 1–3.3)
LYMPHOCYTES # BLD AUTO: 0.06 K/UL — LOW (ref 1–3.3)
LYMPHOCYTES # BLD AUTO: 0.07 K/UL — LOW (ref 1–3.3)
LYMPHOCYTES # BLD AUTO: 0.16 K/UL — LOW (ref 1–3.3)
LYMPHOCYTES # BLD AUTO: 0.28 K/UL — LOW (ref 1–3.3)
LYMPHOCYTES # BLD AUTO: 0.29 K/UL — LOW (ref 1–3.3)
LYMPHOCYTES # BLD AUTO: 0.34 K/UL — LOW (ref 1–3.3)
LYMPHOCYTES # BLD AUTO: 0.34 K/UL — LOW (ref 1–3.3)
LYMPHOCYTES # BLD AUTO: 0.35 K/UL — LOW (ref 1–3.3)
LYMPHOCYTES # BLD AUTO: 0.35 K/UL — LOW (ref 1–3.3)
LYMPHOCYTES # BLD AUTO: 0.36 K/UL — LOW (ref 1–3.3)
LYMPHOCYTES # BLD AUTO: 0.37 K/UL — LOW (ref 1–3.3)
LYMPHOCYTES # BLD AUTO: 0.38 K/UL — LOW (ref 1–3.3)
LYMPHOCYTES # BLD AUTO: 0.39 K/UL — LOW (ref 1–3.3)
LYMPHOCYTES # BLD AUTO: 0.39 K/UL — LOW (ref 1–3.3)
LYMPHOCYTES # BLD AUTO: 0.4 K/UL — LOW (ref 1–3.3)
LYMPHOCYTES # BLD AUTO: 0.4 K/UL — LOW (ref 1–3.3)
LYMPHOCYTES # BLD AUTO: 0.41 K/UL — LOW (ref 1–3.3)
LYMPHOCYTES # BLD AUTO: 0.43 K/UL — LOW (ref 1–3.3)
LYMPHOCYTES # BLD AUTO: 0.43 K/UL — LOW (ref 1–3.3)
LYMPHOCYTES # BLD AUTO: 0.44 K/UL — LOW (ref 1–3.3)
LYMPHOCYTES # BLD AUTO: 0.45 K/UL — LOW (ref 1–3.3)
LYMPHOCYTES # BLD AUTO: 0.46 K/UL — LOW (ref 1–3.3)
LYMPHOCYTES # BLD AUTO: 0.47 K/UL — LOW (ref 1–3.3)
LYMPHOCYTES # BLD AUTO: 0.48 K/UL — LOW (ref 1–3.3)
LYMPHOCYTES # BLD AUTO: 0.5 K/UL — LOW (ref 1–3.3)
LYMPHOCYTES # BLD AUTO: 0.5 K/UL — LOW (ref 1–3.3)
LYMPHOCYTES # BLD AUTO: 0.52 K/UL — LOW (ref 1–3.3)
LYMPHOCYTES # BLD AUTO: 0.54 K/UL — LOW (ref 1–3.3)
LYMPHOCYTES # BLD AUTO: 0.55 K/UL — LOW (ref 1–3.3)
LYMPHOCYTES # BLD AUTO: 0.56 K/UL — LOW (ref 1–3.3)
LYMPHOCYTES # BLD AUTO: 0.56 K/UL — LOW (ref 1–3.3)
LYMPHOCYTES # BLD AUTO: 0.57 K/UL — LOW (ref 1–3.3)
LYMPHOCYTES # BLD AUTO: 0.57 K/UL — LOW (ref 1–3.3)
LYMPHOCYTES # BLD AUTO: 0.58 K/UL — LOW (ref 1–3.3)
LYMPHOCYTES # BLD AUTO: 0.6 K/UL — LOW (ref 1–3.3)
LYMPHOCYTES # BLD AUTO: 0.6 K/UL — LOW (ref 1–3.3)
LYMPHOCYTES # BLD AUTO: 0.62 K/UL — LOW (ref 1–3.3)
LYMPHOCYTES # BLD AUTO: 0.63 K/UL — LOW (ref 1–3.3)
LYMPHOCYTES # BLD AUTO: 0.63 K/UL — LOW (ref 1–3.3)
LYMPHOCYTES # BLD AUTO: 0.64 K/UL — LOW (ref 1–3.3)
LYMPHOCYTES # BLD AUTO: 0.64 K/UL — LOW (ref 1–3.3)
LYMPHOCYTES # BLD AUTO: 0.65 K/UL — LOW (ref 1–3.3)
LYMPHOCYTES # BLD AUTO: 0.66 K/UL — LOW (ref 1–3.3)
LYMPHOCYTES # BLD AUTO: 0.71 K/UL — LOW (ref 1–3.3)
LYMPHOCYTES # BLD AUTO: 0.72 K/UL — LOW (ref 1–3.3)
LYMPHOCYTES # BLD AUTO: 0.73 K/UL — LOW (ref 1–3.3)
LYMPHOCYTES # BLD AUTO: 0.75 K/UL — LOW (ref 1–3.3)
LYMPHOCYTES # BLD AUTO: 0.75 K/UL — LOW (ref 1–3.3)
LYMPHOCYTES # BLD AUTO: 0.77 K/UL — LOW (ref 1–3.3)
LYMPHOCYTES # BLD AUTO: 0.78 K/UL — LOW (ref 1–3.3)
LYMPHOCYTES # BLD AUTO: 0.78 K/UL — LOW (ref 1–3.3)
LYMPHOCYTES # BLD AUTO: 0.8 K/UL — LOW (ref 1–3.3)
LYMPHOCYTES # BLD AUTO: 0.83 K/UL — LOW (ref 1–3.3)
LYMPHOCYTES # BLD AUTO: 0.83 K/UL — LOW (ref 1–3.3)
LYMPHOCYTES # BLD AUTO: 0.85 K/UL — LOW (ref 1–3.3)
LYMPHOCYTES # BLD AUTO: 0.87 K/UL — LOW (ref 1–3.3)
LYMPHOCYTES # BLD AUTO: 0.87 K/UL — LOW (ref 1–3.3)
LYMPHOCYTES # BLD AUTO: 0.88 K/UL — LOW (ref 1–3.3)
LYMPHOCYTES # BLD AUTO: 0.9 % — LOW (ref 13–44)
LYMPHOCYTES # BLD AUTO: 0.9 % — LOW (ref 13–44)
LYMPHOCYTES # BLD AUTO: 0.91 K/UL — LOW (ref 1–3.3)
LYMPHOCYTES # BLD AUTO: 0.94 K/UL — LOW (ref 1–3.3)
LYMPHOCYTES # BLD AUTO: 0.95 K/UL — LOW (ref 1–3.3)
LYMPHOCYTES # BLD AUTO: 0.95 K/UL — LOW (ref 1–3.3)
LYMPHOCYTES # BLD AUTO: 0.96 K/UL — LOW (ref 1–3.3)
LYMPHOCYTES # BLD AUTO: 0.96 K/UL — LOW (ref 1–3.3)
LYMPHOCYTES # BLD AUTO: 0.98 K/UL — LOW (ref 1–3.3)
LYMPHOCYTES # BLD AUTO: 0.99 K/UL — LOW (ref 1–3.3)
LYMPHOCYTES # BLD AUTO: 1 K/UL — SIGNIFICANT CHANGE UP (ref 1–3.3)
LYMPHOCYTES # BLD AUTO: 1.04 K/UL — SIGNIFICANT CHANGE UP (ref 1–3.3)
LYMPHOCYTES # BLD AUTO: 1.11 K/UL — SIGNIFICANT CHANGE UP (ref 1–3.3)
LYMPHOCYTES # BLD AUTO: 1.42 K/UL — SIGNIFICANT CHANGE UP (ref 1–3.3)
LYMPHOCYTES # BLD AUTO: 1.8 % — LOW (ref 13–44)
LYMPHOCYTES # BLD AUTO: 10.8 % — LOW (ref 13–44)
LYMPHOCYTES # BLD AUTO: 11.3 % — LOW (ref 13–44)
LYMPHOCYTES # BLD AUTO: 12.6 % — LOW (ref 13–44)
LYMPHOCYTES # BLD AUTO: 2.8 % — LOW (ref 13–44)
LYMPHOCYTES # BLD AUTO: 3 % — LOW (ref 13–44)
LYMPHOCYTES # BLD AUTO: 3.1 % — LOW (ref 13–44)
LYMPHOCYTES # BLD AUTO: 3.3 % — LOW (ref 13–44)
LYMPHOCYTES # BLD AUTO: 3.3 % — LOW (ref 13–44)
LYMPHOCYTES # BLD AUTO: 3.5 % — LOW (ref 13–44)
LYMPHOCYTES # BLD AUTO: 3.6 % — LOW (ref 13–44)
LYMPHOCYTES # BLD AUTO: 3.7 % — LOW (ref 13–44)
LYMPHOCYTES # BLD AUTO: 3.8 % — LOW (ref 13–44)
LYMPHOCYTES # BLD AUTO: 3.8 % — LOW (ref 13–44)
LYMPHOCYTES # BLD AUTO: 3.9 % — LOW (ref 13–44)
LYMPHOCYTES # BLD AUTO: 3.9 % — LOW (ref 13–44)
LYMPHOCYTES # BLD AUTO: 4.1 % — LOW (ref 13–44)
LYMPHOCYTES # BLD AUTO: 4.2 % — LOW (ref 13–44)
LYMPHOCYTES # BLD AUTO: 4.3 % — LOW (ref 13–44)
LYMPHOCYTES # BLD AUTO: 4.4 % — LOW (ref 13–44)
LYMPHOCYTES # BLD AUTO: 4.5 % — LOW (ref 13–44)
LYMPHOCYTES # BLD AUTO: 4.6 % — LOW (ref 13–44)
LYMPHOCYTES # BLD AUTO: 4.6 % — LOW (ref 13–44)
LYMPHOCYTES # BLD AUTO: 4.7 % — LOW (ref 13–44)
LYMPHOCYTES # BLD AUTO: 4.7 % — LOW (ref 13–44)
LYMPHOCYTES # BLD AUTO: 4.8 % — LOW (ref 13–44)
LYMPHOCYTES # BLD AUTO: 4.9 % — LOW (ref 13–44)
LYMPHOCYTES # BLD AUTO: 5 % — LOW (ref 13–44)
LYMPHOCYTES # BLD AUTO: 5.1 % — LOW (ref 13–44)
LYMPHOCYTES # BLD AUTO: 5.1 % — LOW (ref 13–44)
LYMPHOCYTES # BLD AUTO: 5.2 % — LOW (ref 13–44)
LYMPHOCYTES # BLD AUTO: 5.3 % — LOW (ref 13–44)
LYMPHOCYTES # BLD AUTO: 5.4 % — LOW (ref 13–44)
LYMPHOCYTES # BLD AUTO: 5.4 % — LOW (ref 13–44)
LYMPHOCYTES # BLD AUTO: 5.5 % — LOW (ref 13–44)
LYMPHOCYTES # BLD AUTO: 5.6 % — LOW (ref 13–44)
LYMPHOCYTES # BLD AUTO: 5.7 % — LOW (ref 13–44)
LYMPHOCYTES # BLD AUTO: 5.8 % — LOW (ref 13–44)
LYMPHOCYTES # BLD AUTO: 5.9 % — LOW (ref 13–44)
LYMPHOCYTES # BLD AUTO: 6 % — LOW (ref 13–44)
LYMPHOCYTES # BLD AUTO: 6.2 % — LOW (ref 13–44)
LYMPHOCYTES # BLD AUTO: 6.3 % — LOW (ref 13–44)
LYMPHOCYTES # BLD AUTO: 6.4 % — LOW (ref 13–44)
LYMPHOCYTES # BLD AUTO: 6.4 % — LOW (ref 13–44)
LYMPHOCYTES # BLD AUTO: 6.5 % — LOW (ref 13–44)
LYMPHOCYTES # BLD AUTO: 6.7 % — LOW (ref 13–44)
LYMPHOCYTES # BLD AUTO: 6.7 % — LOW (ref 13–44)
LYMPHOCYTES # BLD AUTO: 6.8 % — LOW (ref 13–44)
LYMPHOCYTES # BLD AUTO: 7 % — LOW (ref 13–44)
LYMPHOCYTES # BLD AUTO: 7.1 % — LOW (ref 13–44)
LYMPHOCYTES # BLD AUTO: 7.1 % — LOW (ref 13–44)
LYMPHOCYTES # BLD AUTO: 7.3 % — LOW (ref 13–44)
LYMPHOCYTES # BLD AUTO: 8.2 % — LOW (ref 13–44)
LYMPHOCYTES # BLD AUTO: 8.2 % — LOW (ref 13–44)
LYMPHOCYTES # BLD AUTO: 8.4 % — LOW (ref 13–44)
LYMPHOCYTES # BLD AUTO: 8.7 % — LOW (ref 13–44)
LYMPHOCYTES # BLD AUTO: 8.8 % — LOW (ref 13–44)
LYMPHOCYTES # BLD AUTO: 9 % — LOW (ref 13–44)
LYMPHOCYTES # BLD AUTO: 9.9 % — LOW (ref 13–44)
MACROCYTES BLD QL: SIGNIFICANT CHANGE UP
MACROCYTES BLD QL: SLIGHT — SIGNIFICANT CHANGE UP
MAGNESIUM SERPL-MCNC: 1.5 MG/DL — LOW (ref 1.6–2.6)
MAGNESIUM SERPL-MCNC: 1.6 MG/DL — SIGNIFICANT CHANGE UP (ref 1.6–2.6)
MAGNESIUM SERPL-MCNC: 1.7 MG/DL — SIGNIFICANT CHANGE UP (ref 1.6–2.6)
MAGNESIUM SERPL-MCNC: 1.8 MG/DL — SIGNIFICANT CHANGE UP (ref 1.6–2.6)
MAGNESIUM SERPL-MCNC: 1.9 MG/DL — SIGNIFICANT CHANGE UP (ref 1.6–2.6)
MAGNESIUM SERPL-MCNC: 2 MG/DL — SIGNIFICANT CHANGE UP (ref 1.6–2.6)
MAGNESIUM SERPL-MCNC: 2.1 MG/DL — SIGNIFICANT CHANGE UP (ref 1.6–2.6)
MAGNESIUM SERPL-MCNC: 2.2 MG/DL — SIGNIFICANT CHANGE UP (ref 1.6–2.6)
MANUAL SMEAR VERIFICATION: SIGNIFICANT CHANGE UP
MCHC RBC-ENTMCNC: 29.3 PG — SIGNIFICANT CHANGE UP (ref 27–34)
MCHC RBC-ENTMCNC: 29.4 PG — SIGNIFICANT CHANGE UP (ref 27–34)
MCHC RBC-ENTMCNC: 29.5 GM/DL — LOW (ref 32–36)
MCHC RBC-ENTMCNC: 29.5 PG — SIGNIFICANT CHANGE UP (ref 27–34)
MCHC RBC-ENTMCNC: 29.5 PG — SIGNIFICANT CHANGE UP (ref 27–34)
MCHC RBC-ENTMCNC: 29.6 PG — SIGNIFICANT CHANGE UP (ref 27–34)
MCHC RBC-ENTMCNC: 29.7 PG — SIGNIFICANT CHANGE UP (ref 27–34)
MCHC RBC-ENTMCNC: 29.8 PG — SIGNIFICANT CHANGE UP (ref 27–34)
MCHC RBC-ENTMCNC: 29.9 PG — SIGNIFICANT CHANGE UP (ref 27–34)
MCHC RBC-ENTMCNC: 30 PG — SIGNIFICANT CHANGE UP (ref 27–34)
MCHC RBC-ENTMCNC: 30.1 GM/DL — LOW (ref 32–36)
MCHC RBC-ENTMCNC: 30.1 PG — SIGNIFICANT CHANGE UP (ref 27–34)
MCHC RBC-ENTMCNC: 30.2 GM/DL — LOW (ref 32–36)
MCHC RBC-ENTMCNC: 30.2 PG — SIGNIFICANT CHANGE UP (ref 27–34)
MCHC RBC-ENTMCNC: 30.3 PG — SIGNIFICANT CHANGE UP (ref 27–34)
MCHC RBC-ENTMCNC: 30.4 GM/DL — LOW (ref 32–36)
MCHC RBC-ENTMCNC: 30.4 GM/DL — LOW (ref 32–36)
MCHC RBC-ENTMCNC: 30.4 PG — SIGNIFICANT CHANGE UP (ref 27–34)
MCHC RBC-ENTMCNC: 30.5 GM/DL — LOW (ref 32–36)
MCHC RBC-ENTMCNC: 30.5 PG — SIGNIFICANT CHANGE UP (ref 27–34)
MCHC RBC-ENTMCNC: 30.6 GM/DL — LOW (ref 32–36)
MCHC RBC-ENTMCNC: 30.6 PG — SIGNIFICANT CHANGE UP (ref 27–34)
MCHC RBC-ENTMCNC: 30.7 GM/DL — LOW (ref 32–36)
MCHC RBC-ENTMCNC: 30.7 PG — SIGNIFICANT CHANGE UP (ref 27–34)
MCHC RBC-ENTMCNC: 30.8 GM/DL — LOW (ref 32–36)
MCHC RBC-ENTMCNC: 30.8 PG — SIGNIFICANT CHANGE UP (ref 27–34)
MCHC RBC-ENTMCNC: 30.9 GM/DL — LOW (ref 32–36)
MCHC RBC-ENTMCNC: 30.9 PG — SIGNIFICANT CHANGE UP (ref 27–34)
MCHC RBC-ENTMCNC: 31 GM/DL — LOW (ref 32–36)
MCHC RBC-ENTMCNC: 31 GM/DL — LOW (ref 32–36)
MCHC RBC-ENTMCNC: 31 PG — SIGNIFICANT CHANGE UP (ref 27–34)
MCHC RBC-ENTMCNC: 31.1 GM/DL — LOW (ref 32–36)
MCHC RBC-ENTMCNC: 31.1 PG — SIGNIFICANT CHANGE UP (ref 27–34)
MCHC RBC-ENTMCNC: 31.2 GM/DL — LOW (ref 32–36)
MCHC RBC-ENTMCNC: 31.2 GM/DL — LOW (ref 32–36)
MCHC RBC-ENTMCNC: 31.2 PG — SIGNIFICANT CHANGE UP (ref 27–34)
MCHC RBC-ENTMCNC: 31.3 GM/DL — LOW (ref 32–36)
MCHC RBC-ENTMCNC: 31.3 PG — SIGNIFICANT CHANGE UP (ref 27–34)
MCHC RBC-ENTMCNC: 31.4 PG — SIGNIFICANT CHANGE UP (ref 27–34)
MCHC RBC-ENTMCNC: 31.5 GM/DL — LOW (ref 32–36)
MCHC RBC-ENTMCNC: 31.5 GM/DL — LOW (ref 32–36)
MCHC RBC-ENTMCNC: 31.5 PG — SIGNIFICANT CHANGE UP (ref 27–34)
MCHC RBC-ENTMCNC: 31.5 PG — SIGNIFICANT CHANGE UP (ref 27–34)
MCHC RBC-ENTMCNC: 31.6 PG — SIGNIFICANT CHANGE UP (ref 27–34)
MCHC RBC-ENTMCNC: 31.7 GM/DL — LOW (ref 32–36)
MCHC RBC-ENTMCNC: 31.8 GM/DL — LOW (ref 32–36)
MCHC RBC-ENTMCNC: 31.8 PG — SIGNIFICANT CHANGE UP (ref 27–34)
MCHC RBC-ENTMCNC: 31.9 GM/DL — LOW (ref 32–36)
MCHC RBC-ENTMCNC: 32 GM/DL — SIGNIFICANT CHANGE UP (ref 32–36)
MCHC RBC-ENTMCNC: 32 PG — SIGNIFICANT CHANGE UP (ref 27–34)
MCHC RBC-ENTMCNC: 32.1 GM/DL — SIGNIFICANT CHANGE UP (ref 32–36)
MCHC RBC-ENTMCNC: 32.1 GM/DL — SIGNIFICANT CHANGE UP (ref 32–36)
MCHC RBC-ENTMCNC: 32.2 GM/DL — SIGNIFICANT CHANGE UP (ref 32–36)
MCHC RBC-ENTMCNC: 32.2 GM/DL — SIGNIFICANT CHANGE UP (ref 32–36)
MCHC RBC-ENTMCNC: 32.3 GM/DL — SIGNIFICANT CHANGE UP (ref 32–36)
MCHC RBC-ENTMCNC: 32.4 GM/DL — SIGNIFICANT CHANGE UP (ref 32–36)
MCHC RBC-ENTMCNC: 32.5 GM/DL — SIGNIFICANT CHANGE UP (ref 32–36)
MCHC RBC-ENTMCNC: 32.6 GM/DL — SIGNIFICANT CHANGE UP (ref 32–36)
MCHC RBC-ENTMCNC: 32.7 GM/DL — SIGNIFICANT CHANGE UP (ref 32–36)
MCHC RBC-ENTMCNC: 32.8 GM/DL — SIGNIFICANT CHANGE UP (ref 32–36)
MCHC RBC-ENTMCNC: 32.9 GM/DL — SIGNIFICANT CHANGE UP (ref 32–36)
MCHC RBC-ENTMCNC: 33 GM/DL — SIGNIFICANT CHANGE UP (ref 32–36)
MCHC RBC-ENTMCNC: 33.1 GM/DL — SIGNIFICANT CHANGE UP (ref 32–36)
MCHC RBC-ENTMCNC: 33.2 GM/DL — SIGNIFICANT CHANGE UP (ref 32–36)
MCHC RBC-ENTMCNC: 33.2 GM/DL — SIGNIFICANT CHANGE UP (ref 32–36)
MCHC RBC-ENTMCNC: 33.3 GM/DL — SIGNIFICANT CHANGE UP (ref 32–36)
MCHC RBC-ENTMCNC: 33.4 GM/DL — SIGNIFICANT CHANGE UP (ref 32–36)
MCHC RBC-ENTMCNC: 33.5 GM/DL — SIGNIFICANT CHANGE UP (ref 32–36)
MCHC RBC-ENTMCNC: 33.6 GM/DL — SIGNIFICANT CHANGE UP (ref 32–36)
MCHC RBC-ENTMCNC: 33.7 GM/DL — SIGNIFICANT CHANGE UP (ref 32–36)
MCHC RBC-ENTMCNC: 33.8 GM/DL — SIGNIFICANT CHANGE UP (ref 32–36)
MCHC RBC-ENTMCNC: 33.8 GM/DL — SIGNIFICANT CHANGE UP (ref 32–36)
MCHC RBC-ENTMCNC: 33.9 GM/DL — SIGNIFICANT CHANGE UP (ref 32–36)
MCHC RBC-ENTMCNC: 34 GM/DL — SIGNIFICANT CHANGE UP (ref 32–36)
MCHC RBC-ENTMCNC: 34.1 GM/DL — SIGNIFICANT CHANGE UP (ref 32–36)
MCHC RBC-ENTMCNC: 34.1 GM/DL — SIGNIFICANT CHANGE UP (ref 32–36)
MCHC RBC-ENTMCNC: 34.2 GM/DL — SIGNIFICANT CHANGE UP (ref 32–36)
MCHC RBC-ENTMCNC: 34.3 GM/DL — SIGNIFICANT CHANGE UP (ref 32–36)
MCHC RBC-ENTMCNC: 34.4 GM/DL — SIGNIFICANT CHANGE UP (ref 32–36)
MCHC RBC-ENTMCNC: 34.5 GM/DL — SIGNIFICANT CHANGE UP (ref 32–36)
MCHC RBC-ENTMCNC: 34.6 GM/DL — SIGNIFICANT CHANGE UP (ref 32–36)
MCHC RBC-ENTMCNC: 34.9 GM/DL — SIGNIFICANT CHANGE UP (ref 32–36)
MCHC RBC-ENTMCNC: 35.1 GM/DL — SIGNIFICANT CHANGE UP (ref 32–36)
MCHC RBC-ENTMCNC: 35.2 GM/DL — SIGNIFICANT CHANGE UP (ref 32–36)
MCHC RBC-ENTMCNC: 35.5 GM/DL — SIGNIFICANT CHANGE UP (ref 32–36)
MCHC RBC-ENTMCNC: 35.5 GM/DL — SIGNIFICANT CHANGE UP (ref 32–36)
MCV RBC AUTO: 100 FL — SIGNIFICANT CHANGE UP (ref 80–100)
MCV RBC AUTO: 100 FL — SIGNIFICANT CHANGE UP (ref 80–100)
MCV RBC AUTO: 100.4 FL — HIGH (ref 80–100)
MCV RBC AUTO: 100.7 FL — HIGH (ref 80–100)
MCV RBC AUTO: 101.1 FL — HIGH (ref 80–100)
MCV RBC AUTO: 101.3 FL — HIGH (ref 80–100)
MCV RBC AUTO: 106.8 FL — HIGH (ref 80–100)
MCV RBC AUTO: 84.5 FL — SIGNIFICANT CHANGE UP (ref 80–100)
MCV RBC AUTO: 84.7 FL — SIGNIFICANT CHANGE UP (ref 80–100)
MCV RBC AUTO: 85 FL — SIGNIFICANT CHANGE UP (ref 80–100)
MCV RBC AUTO: 85.8 FL — SIGNIFICANT CHANGE UP (ref 80–100)
MCV RBC AUTO: 86 FL — SIGNIFICANT CHANGE UP (ref 80–100)
MCV RBC AUTO: 86.1 FL — SIGNIFICANT CHANGE UP (ref 80–100)
MCV RBC AUTO: 86.2 FL — SIGNIFICANT CHANGE UP (ref 80–100)
MCV RBC AUTO: 86.6 FL — SIGNIFICANT CHANGE UP (ref 80–100)
MCV RBC AUTO: 86.7 FL — SIGNIFICANT CHANGE UP (ref 80–100)
MCV RBC AUTO: 86.8 FL — SIGNIFICANT CHANGE UP (ref 80–100)
MCV RBC AUTO: 86.9 FL — SIGNIFICANT CHANGE UP (ref 80–100)
MCV RBC AUTO: 87.1 FL — SIGNIFICANT CHANGE UP (ref 80–100)
MCV RBC AUTO: 87.2 FL — SIGNIFICANT CHANGE UP (ref 80–100)
MCV RBC AUTO: 87.6 FL — SIGNIFICANT CHANGE UP (ref 80–100)
MCV RBC AUTO: 88 FL — SIGNIFICANT CHANGE UP (ref 80–100)
MCV RBC AUTO: 88.3 FL — SIGNIFICANT CHANGE UP (ref 80–100)
MCV RBC AUTO: 88.4 FL — SIGNIFICANT CHANGE UP (ref 80–100)
MCV RBC AUTO: 88.6 FL — SIGNIFICANT CHANGE UP (ref 80–100)
MCV RBC AUTO: 89.1 FL — SIGNIFICANT CHANGE UP (ref 80–100)
MCV RBC AUTO: 89.2 FL — SIGNIFICANT CHANGE UP (ref 80–100)
MCV RBC AUTO: 89.3 FL — SIGNIFICANT CHANGE UP (ref 80–100)
MCV RBC AUTO: 89.3 FL — SIGNIFICANT CHANGE UP (ref 80–100)
MCV RBC AUTO: 89.5 FL — SIGNIFICANT CHANGE UP (ref 80–100)
MCV RBC AUTO: 90.2 FL — SIGNIFICANT CHANGE UP (ref 80–100)
MCV RBC AUTO: 90.3 FL — SIGNIFICANT CHANGE UP (ref 80–100)
MCV RBC AUTO: 90.3 FL — SIGNIFICANT CHANGE UP (ref 80–100)
MCV RBC AUTO: 90.5 FL — SIGNIFICANT CHANGE UP (ref 80–100)
MCV RBC AUTO: 90.6 FL — SIGNIFICANT CHANGE UP (ref 80–100)
MCV RBC AUTO: 90.8 FL — SIGNIFICANT CHANGE UP (ref 80–100)
MCV RBC AUTO: 91 FL — SIGNIFICANT CHANGE UP (ref 80–100)
MCV RBC AUTO: 91.2 FL — SIGNIFICANT CHANGE UP (ref 80–100)
MCV RBC AUTO: 91.3 FL — SIGNIFICANT CHANGE UP (ref 80–100)
MCV RBC AUTO: 91.3 FL — SIGNIFICANT CHANGE UP (ref 80–100)
MCV RBC AUTO: 91.4 FL — SIGNIFICANT CHANGE UP (ref 80–100)
MCV RBC AUTO: 91.5 FL — SIGNIFICANT CHANGE UP (ref 80–100)
MCV RBC AUTO: 91.9 FL — SIGNIFICANT CHANGE UP (ref 80–100)
MCV RBC AUTO: 92.1 FL — SIGNIFICANT CHANGE UP (ref 80–100)
MCV RBC AUTO: 92.2 FL — SIGNIFICANT CHANGE UP (ref 80–100)
MCV RBC AUTO: 92.3 FL — SIGNIFICANT CHANGE UP (ref 80–100)
MCV RBC AUTO: 92.3 FL — SIGNIFICANT CHANGE UP (ref 80–100)
MCV RBC AUTO: 92.4 FL — SIGNIFICANT CHANGE UP (ref 80–100)
MCV RBC AUTO: 92.5 FL — SIGNIFICANT CHANGE UP (ref 80–100)
MCV RBC AUTO: 92.6 FL — SIGNIFICANT CHANGE UP (ref 80–100)
MCV RBC AUTO: 92.6 FL — SIGNIFICANT CHANGE UP (ref 80–100)
MCV RBC AUTO: 92.7 FL — SIGNIFICANT CHANGE UP (ref 80–100)
MCV RBC AUTO: 92.8 FL — SIGNIFICANT CHANGE UP (ref 80–100)
MCV RBC AUTO: 93.2 FL — SIGNIFICANT CHANGE UP (ref 80–100)
MCV RBC AUTO: 93.3 FL — SIGNIFICANT CHANGE UP (ref 80–100)
MCV RBC AUTO: 93.4 FL — SIGNIFICANT CHANGE UP (ref 80–100)
MCV RBC AUTO: 93.5 FL — SIGNIFICANT CHANGE UP (ref 80–100)
MCV RBC AUTO: 93.6 FL — SIGNIFICANT CHANGE UP (ref 80–100)
MCV RBC AUTO: 93.7 FL — SIGNIFICANT CHANGE UP (ref 80–100)
MCV RBC AUTO: 93.8 FL — SIGNIFICANT CHANGE UP (ref 80–100)
MCV RBC AUTO: 94 FL — SIGNIFICANT CHANGE UP (ref 80–100)
MCV RBC AUTO: 94.1 FL — SIGNIFICANT CHANGE UP (ref 80–100)
MCV RBC AUTO: 94.4 FL — SIGNIFICANT CHANGE UP (ref 80–100)
MCV RBC AUTO: 94.6 FL — SIGNIFICANT CHANGE UP (ref 80–100)
MCV RBC AUTO: 94.6 FL — SIGNIFICANT CHANGE UP (ref 80–100)
MCV RBC AUTO: 94.7 FL — SIGNIFICANT CHANGE UP (ref 80–100)
MCV RBC AUTO: 94.9 FL — SIGNIFICANT CHANGE UP (ref 80–100)
MCV RBC AUTO: 95.2 FL — SIGNIFICANT CHANGE UP (ref 80–100)
MCV RBC AUTO: 96 FL — SIGNIFICANT CHANGE UP (ref 80–100)
MCV RBC AUTO: 96.1 FL — SIGNIFICANT CHANGE UP (ref 80–100)
MCV RBC AUTO: 96.6 FL — SIGNIFICANT CHANGE UP (ref 80–100)
MCV RBC AUTO: 96.7 FL — SIGNIFICANT CHANGE UP (ref 80–100)
MCV RBC AUTO: 96.8 FL — SIGNIFICANT CHANGE UP (ref 80–100)
MCV RBC AUTO: 97 FL — SIGNIFICANT CHANGE UP (ref 80–100)
MCV RBC AUTO: 97 FL — SIGNIFICANT CHANGE UP (ref 80–100)
MCV RBC AUTO: 97.5 FL — SIGNIFICANT CHANGE UP (ref 80–100)
MCV RBC AUTO: 97.6 FL — SIGNIFICANT CHANGE UP (ref 80–100)
MCV RBC AUTO: 97.6 FL — SIGNIFICANT CHANGE UP (ref 80–100)
MCV RBC AUTO: 97.7 FL — SIGNIFICANT CHANGE UP (ref 80–100)
MCV RBC AUTO: 98 FL — SIGNIFICANT CHANGE UP (ref 80–100)
MCV RBC AUTO: 98.5 FL — SIGNIFICANT CHANGE UP (ref 80–100)
MCV RBC AUTO: 98.6 FL — SIGNIFICANT CHANGE UP (ref 80–100)
MCV RBC AUTO: 98.8 FL — SIGNIFICANT CHANGE UP (ref 80–100)
MCV RBC AUTO: 99 FL — SIGNIFICANT CHANGE UP (ref 80–100)
MCV RBC AUTO: 99.3 FL — SIGNIFICANT CHANGE UP (ref 80–100)
MCV RBC AUTO: 99.3 FL — SIGNIFICANT CHANGE UP (ref 80–100)
MCV RBC AUTO: 99.6 FL — SIGNIFICANT CHANGE UP (ref 80–100)
MCV RBC AUTO: 99.7 FL — SIGNIFICANT CHANGE UP (ref 80–100)
METAMYELOCYTES # FLD: 2.6 % — HIGH (ref 0–0)
METAMYELOCYTES # FLD: 3.5 % — HIGH (ref 0–0)
METHOD TYPE: SIGNIFICANT CHANGE UP
MONOCYTES # BLD AUTO: 0 K/UL — SIGNIFICANT CHANGE UP (ref 0–0.9)
MONOCYTES # BLD AUTO: 0.08 K/UL — SIGNIFICANT CHANGE UP (ref 0–0.9)
MONOCYTES # BLD AUTO: 0.12 K/UL — SIGNIFICANT CHANGE UP (ref 0–0.9)
MONOCYTES # BLD AUTO: 0.13 K/UL — SIGNIFICANT CHANGE UP (ref 0–0.9)
MONOCYTES # BLD AUTO: 0.18 K/UL — SIGNIFICANT CHANGE UP (ref 0–0.9)
MONOCYTES # BLD AUTO: 0.19 K/UL — SIGNIFICANT CHANGE UP (ref 0–0.9)
MONOCYTES # BLD AUTO: 0.2 K/UL — SIGNIFICANT CHANGE UP (ref 0–0.9)
MONOCYTES # BLD AUTO: 0.21 K/UL — SIGNIFICANT CHANGE UP (ref 0–0.9)
MONOCYTES # BLD AUTO: 0.22 K/UL — SIGNIFICANT CHANGE UP (ref 0–0.9)
MONOCYTES # BLD AUTO: 0.24 K/UL — SIGNIFICANT CHANGE UP (ref 0–0.9)
MONOCYTES # BLD AUTO: 0.25 K/UL — SIGNIFICANT CHANGE UP (ref 0–0.9)
MONOCYTES # BLD AUTO: 0.25 K/UL — SIGNIFICANT CHANGE UP (ref 0–0.9)
MONOCYTES # BLD AUTO: 0.26 K/UL — SIGNIFICANT CHANGE UP (ref 0–0.9)
MONOCYTES # BLD AUTO: 0.26 K/UL — SIGNIFICANT CHANGE UP (ref 0–0.9)
MONOCYTES # BLD AUTO: 0.28 K/UL — SIGNIFICANT CHANGE UP (ref 0–0.9)
MONOCYTES # BLD AUTO: 0.29 K/UL — SIGNIFICANT CHANGE UP (ref 0–0.9)
MONOCYTES # BLD AUTO: 0.3 K/UL — SIGNIFICANT CHANGE UP (ref 0–0.9)
MONOCYTES # BLD AUTO: 0.31 K/UL — SIGNIFICANT CHANGE UP (ref 0–0.9)
MONOCYTES # BLD AUTO: 0.31 K/UL — SIGNIFICANT CHANGE UP (ref 0–0.9)
MONOCYTES # BLD AUTO: 0.32 K/UL — SIGNIFICANT CHANGE UP (ref 0–0.9)
MONOCYTES # BLD AUTO: 0.33 K/UL — SIGNIFICANT CHANGE UP (ref 0–0.9)
MONOCYTES # BLD AUTO: 0.33 K/UL — SIGNIFICANT CHANGE UP (ref 0–0.9)
MONOCYTES # BLD AUTO: 0.34 K/UL — SIGNIFICANT CHANGE UP (ref 0–0.9)
MONOCYTES # BLD AUTO: 0.35 K/UL — SIGNIFICANT CHANGE UP (ref 0–0.9)
MONOCYTES # BLD AUTO: 0.36 K/UL — SIGNIFICANT CHANGE UP (ref 0–0.9)
MONOCYTES # BLD AUTO: 0.37 K/UL — SIGNIFICANT CHANGE UP (ref 0–0.9)
MONOCYTES # BLD AUTO: 0.38 K/UL — SIGNIFICANT CHANGE UP (ref 0–0.9)
MONOCYTES # BLD AUTO: 0.39 K/UL — SIGNIFICANT CHANGE UP (ref 0–0.9)
MONOCYTES # BLD AUTO: 0.39 K/UL — SIGNIFICANT CHANGE UP (ref 0–0.9)
MONOCYTES # BLD AUTO: 0.4 K/UL — SIGNIFICANT CHANGE UP (ref 0–0.9)
MONOCYTES # BLD AUTO: 0.41 K/UL — SIGNIFICANT CHANGE UP (ref 0–0.9)
MONOCYTES # BLD AUTO: 0.41 K/UL — SIGNIFICANT CHANGE UP (ref 0–0.9)
MONOCYTES # BLD AUTO: 0.42 K/UL — SIGNIFICANT CHANGE UP (ref 0–0.9)
MONOCYTES # BLD AUTO: 0.43 K/UL — SIGNIFICANT CHANGE UP (ref 0–0.9)
MONOCYTES # BLD AUTO: 0.44 K/UL — SIGNIFICANT CHANGE UP (ref 0–0.9)
MONOCYTES # BLD AUTO: 0.44 K/UL — SIGNIFICANT CHANGE UP (ref 0–0.9)
MONOCYTES # BLD AUTO: 0.45 K/UL — SIGNIFICANT CHANGE UP (ref 0–0.9)
MONOCYTES # BLD AUTO: 0.46 K/UL — SIGNIFICANT CHANGE UP (ref 0–0.9)
MONOCYTES # BLD AUTO: 0.46 K/UL — SIGNIFICANT CHANGE UP (ref 0–0.9)
MONOCYTES # BLD AUTO: 0.47 K/UL — SIGNIFICANT CHANGE UP (ref 0–0.9)
MONOCYTES # BLD AUTO: 0.5 K/UL — SIGNIFICANT CHANGE UP (ref 0–0.9)
MONOCYTES # BLD AUTO: 0.51 K/UL — SIGNIFICANT CHANGE UP (ref 0–0.9)
MONOCYTES # BLD AUTO: 0.51 K/UL — SIGNIFICANT CHANGE UP (ref 0–0.9)
MONOCYTES # BLD AUTO: 0.53 K/UL — SIGNIFICANT CHANGE UP (ref 0–0.9)
MONOCYTES # BLD AUTO: 0.54 K/UL — SIGNIFICANT CHANGE UP (ref 0–0.9)
MONOCYTES # BLD AUTO: 0.56 K/UL — SIGNIFICANT CHANGE UP (ref 0–0.9)
MONOCYTES # BLD AUTO: 0.56 K/UL — SIGNIFICANT CHANGE UP (ref 0–0.9)
MONOCYTES # BLD AUTO: 0.57 K/UL — SIGNIFICANT CHANGE UP (ref 0–0.9)
MONOCYTES # BLD AUTO: 0.65 K/UL — SIGNIFICANT CHANGE UP (ref 0–0.9)
MONOCYTES # BLD AUTO: 0.69 K/UL — SIGNIFICANT CHANGE UP (ref 0–0.9)
MONOCYTES # BLD AUTO: 0.7 K/UL — SIGNIFICANT CHANGE UP (ref 0–0.9)
MONOCYTES # BLD AUTO: 0.72 K/UL — SIGNIFICANT CHANGE UP (ref 0–0.9)
MONOCYTES # BLD AUTO: 0.73 K/UL — SIGNIFICANT CHANGE UP (ref 0–0.9)
MONOCYTES # BLD AUTO: 0.74 K/UL — SIGNIFICANT CHANGE UP (ref 0–0.9)
MONOCYTES # BLD AUTO: 0.75 K/UL — SIGNIFICANT CHANGE UP (ref 0–0.9)
MONOCYTES # BLD AUTO: 0.82 K/UL — SIGNIFICANT CHANGE UP (ref 0–0.9)
MONOCYTES # BLD AUTO: 0.84 K/UL — SIGNIFICANT CHANGE UP (ref 0–0.9)
MONOCYTES # BLD AUTO: 0.89 K/UL — SIGNIFICANT CHANGE UP (ref 0–0.9)
MONOCYTES # BLD AUTO: 1.06 K/UL — HIGH (ref 0–0.9)
MONOCYTES # BLD AUTO: 1.31 K/UL — HIGH (ref 0–0.9)
MONOCYTES # BLD AUTO: 1.34 K/UL — HIGH (ref 0–0.9)
MONOCYTES # BLD AUTO: 1.36 K/UL — HIGH (ref 0–0.9)
MONOCYTES NFR BLD AUTO: 0 % — LOW (ref 2–14)
MONOCYTES NFR BLD AUTO: 0.9 % — LOW (ref 2–14)
MONOCYTES NFR BLD AUTO: 0.9 % — LOW (ref 2–14)
MONOCYTES NFR BLD AUTO: 1.1 % — LOW (ref 2–14)
MONOCYTES NFR BLD AUTO: 1.5 % — LOW (ref 2–14)
MONOCYTES NFR BLD AUTO: 1.7 % — LOW (ref 2–14)
MONOCYTES NFR BLD AUTO: 1.7 % — LOW (ref 2–14)
MONOCYTES NFR BLD AUTO: 1.8 % — LOW (ref 2–14)
MONOCYTES NFR BLD AUTO: 12.2 % — SIGNIFICANT CHANGE UP (ref 2–14)
MONOCYTES NFR BLD AUTO: 12.6 % — SIGNIFICANT CHANGE UP (ref 2–14)
MONOCYTES NFR BLD AUTO: 13.4 % — SIGNIFICANT CHANGE UP (ref 2–14)
MONOCYTES NFR BLD AUTO: 14 % — SIGNIFICANT CHANGE UP (ref 2–14)
MONOCYTES NFR BLD AUTO: 2 % — SIGNIFICANT CHANGE UP (ref 2–14)
MONOCYTES NFR BLD AUTO: 2.2 % — SIGNIFICANT CHANGE UP (ref 2–14)
MONOCYTES NFR BLD AUTO: 2.3 % — SIGNIFICANT CHANGE UP (ref 2–14)
MONOCYTES NFR BLD AUTO: 2.4 % — SIGNIFICANT CHANGE UP (ref 2–14)
MONOCYTES NFR BLD AUTO: 2.5 % — SIGNIFICANT CHANGE UP (ref 2–14)
MONOCYTES NFR BLD AUTO: 2.5 % — SIGNIFICANT CHANGE UP (ref 2–14)
MONOCYTES NFR BLD AUTO: 2.6 % — SIGNIFICANT CHANGE UP (ref 2–14)
MONOCYTES NFR BLD AUTO: 2.7 % — SIGNIFICANT CHANGE UP (ref 2–14)
MONOCYTES NFR BLD AUTO: 2.8 % — SIGNIFICANT CHANGE UP (ref 2–14)
MONOCYTES NFR BLD AUTO: 2.9 % — SIGNIFICANT CHANGE UP (ref 2–14)
MONOCYTES NFR BLD AUTO: 3 % — SIGNIFICANT CHANGE UP (ref 2–14)
MONOCYTES NFR BLD AUTO: 3 % — SIGNIFICANT CHANGE UP (ref 2–14)
MONOCYTES NFR BLD AUTO: 3.1 % — SIGNIFICANT CHANGE UP (ref 2–14)
MONOCYTES NFR BLD AUTO: 3.2 % — SIGNIFICANT CHANGE UP (ref 2–14)
MONOCYTES NFR BLD AUTO: 3.3 % — SIGNIFICANT CHANGE UP (ref 2–14)
MONOCYTES NFR BLD AUTO: 3.4 % — SIGNIFICANT CHANGE UP (ref 2–14)
MONOCYTES NFR BLD AUTO: 3.4 % — SIGNIFICANT CHANGE UP (ref 2–14)
MONOCYTES NFR BLD AUTO: 3.5 % — SIGNIFICANT CHANGE UP (ref 2–14)
MONOCYTES NFR BLD AUTO: 3.6 % — SIGNIFICANT CHANGE UP (ref 2–14)
MONOCYTES NFR BLD AUTO: 3.7 % — SIGNIFICANT CHANGE UP (ref 2–14)
MONOCYTES NFR BLD AUTO: 3.8 % — SIGNIFICANT CHANGE UP (ref 2–14)
MONOCYTES NFR BLD AUTO: 3.8 % — SIGNIFICANT CHANGE UP (ref 2–14)
MONOCYTES NFR BLD AUTO: 4 % — SIGNIFICANT CHANGE UP (ref 2–14)
MONOCYTES NFR BLD AUTO: 4.1 % — SIGNIFICANT CHANGE UP (ref 2–14)
MONOCYTES NFR BLD AUTO: 4.1 % — SIGNIFICANT CHANGE UP (ref 2–14)
MONOCYTES NFR BLD AUTO: 4.2 % — SIGNIFICANT CHANGE UP (ref 2–14)
MONOCYTES NFR BLD AUTO: 4.4 % — SIGNIFICANT CHANGE UP (ref 2–14)
MONOCYTES NFR BLD AUTO: 4.5 % — SIGNIFICANT CHANGE UP (ref 2–14)
MONOCYTES NFR BLD AUTO: 4.6 % — SIGNIFICANT CHANGE UP (ref 2–14)
MONOCYTES NFR BLD AUTO: 4.8 % — SIGNIFICANT CHANGE UP (ref 2–14)
MONOCYTES NFR BLD AUTO: 4.9 % — SIGNIFICANT CHANGE UP (ref 2–14)
MONOCYTES NFR BLD AUTO: 5.1 % — SIGNIFICANT CHANGE UP (ref 2–14)
MONOCYTES NFR BLD AUTO: 5.2 % — SIGNIFICANT CHANGE UP (ref 2–14)
MONOCYTES NFR BLD AUTO: 5.2 % — SIGNIFICANT CHANGE UP (ref 2–14)
MONOCYTES NFR BLD AUTO: 5.3 % — SIGNIFICANT CHANGE UP (ref 2–14)
MONOCYTES NFR BLD AUTO: 5.4 % — SIGNIFICANT CHANGE UP (ref 2–14)
MONOCYTES NFR BLD AUTO: 5.5 % — SIGNIFICANT CHANGE UP (ref 2–14)
MONOCYTES NFR BLD AUTO: 5.8 % — SIGNIFICANT CHANGE UP (ref 2–14)
MONOCYTES NFR BLD AUTO: 6.2 % — SIGNIFICANT CHANGE UP (ref 2–14)
MONOCYTES NFR BLD AUTO: 6.2 % — SIGNIFICANT CHANGE UP (ref 2–14)
MONOCYTES NFR BLD AUTO: 6.5 % — SIGNIFICANT CHANGE UP (ref 2–14)
MONOCYTES NFR BLD AUTO: 6.6 % — SIGNIFICANT CHANGE UP (ref 2–14)
MONOCYTES NFR BLD AUTO: 7.1 % — SIGNIFICANT CHANGE UP (ref 2–14)
MONOCYTES NFR BLD AUTO: 7.5 % — SIGNIFICANT CHANGE UP (ref 2–14)
MONOCYTES NFR BLD AUTO: 7.8 % — SIGNIFICANT CHANGE UP (ref 2–14)
MONOCYTES NFR BLD AUTO: 7.9 % — SIGNIFICANT CHANGE UP (ref 2–14)
MRSA PCR RESULT.: SIGNIFICANT CHANGE UP
MYELOCYTES NFR BLD: 1.8 % — HIGH (ref 0–0)
NEUTROPHILS # BLD AUTO: 10.08 K/UL — HIGH (ref 1.8–7.4)
NEUTROPHILS # BLD AUTO: 10.21 K/UL — HIGH (ref 1.8–7.4)
NEUTROPHILS # BLD AUTO: 10.34 K/UL — HIGH (ref 1.8–7.4)
NEUTROPHILS # BLD AUTO: 10.62 K/UL — HIGH (ref 1.8–7.4)
NEUTROPHILS # BLD AUTO: 10.66 K/UL — HIGH (ref 1.8–7.4)
NEUTROPHILS # BLD AUTO: 10.87 K/UL — HIGH (ref 1.8–7.4)
NEUTROPHILS # BLD AUTO: 10.87 K/UL — HIGH (ref 1.8–7.4)
NEUTROPHILS # BLD AUTO: 10.92 K/UL — HIGH (ref 1.8–7.4)
NEUTROPHILS # BLD AUTO: 10.93 K/UL — HIGH (ref 1.8–7.4)
NEUTROPHILS # BLD AUTO: 10.94 K/UL — HIGH (ref 1.8–7.4)
NEUTROPHILS # BLD AUTO: 11 K/UL — HIGH (ref 1.8–7.4)
NEUTROPHILS # BLD AUTO: 11.11 K/UL — HIGH (ref 1.8–7.4)
NEUTROPHILS # BLD AUTO: 11.25 K/UL — HIGH (ref 1.8–7.4)
NEUTROPHILS # BLD AUTO: 11.98 K/UL — HIGH (ref 1.8–7.4)
NEUTROPHILS # BLD AUTO: 12.11 K/UL — HIGH (ref 1.8–7.4)
NEUTROPHILS # BLD AUTO: 12.32 K/UL — HIGH (ref 1.8–7.4)
NEUTROPHILS # BLD AUTO: 12.35 K/UL — HIGH (ref 1.8–7.4)
NEUTROPHILS # BLD AUTO: 12.45 K/UL — HIGH (ref 1.8–7.4)
NEUTROPHILS # BLD AUTO: 12.53 K/UL — HIGH (ref 1.8–7.4)
NEUTROPHILS # BLD AUTO: 12.59 K/UL — HIGH (ref 1.8–7.4)
NEUTROPHILS # BLD AUTO: 13.01 K/UL — HIGH (ref 1.8–7.4)
NEUTROPHILS # BLD AUTO: 13.07 K/UL — HIGH (ref 1.8–7.4)
NEUTROPHILS # BLD AUTO: 13.08 K/UL — HIGH (ref 1.8–7.4)
NEUTROPHILS # BLD AUTO: 13.5 K/UL — HIGH (ref 1.8–7.4)
NEUTROPHILS # BLD AUTO: 13.78 K/UL — HIGH (ref 1.8–7.4)
NEUTROPHILS # BLD AUTO: 14.28 K/UL — HIGH (ref 1.8–7.4)
NEUTROPHILS # BLD AUTO: 14.82 K/UL — HIGH (ref 1.8–7.4)
NEUTROPHILS # BLD AUTO: 14.85 K/UL — HIGH (ref 1.8–7.4)
NEUTROPHILS # BLD AUTO: 15.14 K/UL — HIGH (ref 1.8–7.4)
NEUTROPHILS # BLD AUTO: 15.22 K/UL — HIGH (ref 1.8–7.4)
NEUTROPHILS # BLD AUTO: 15.74 K/UL — HIGH (ref 1.8–7.4)
NEUTROPHILS # BLD AUTO: 16.69 K/UL — HIGH (ref 1.8–7.4)
NEUTROPHILS # BLD AUTO: 17.48 K/UL — HIGH (ref 1.8–7.4)
NEUTROPHILS # BLD AUTO: 17.59 K/UL — HIGH (ref 1.8–7.4)
NEUTROPHILS # BLD AUTO: 5.19 K/UL — SIGNIFICANT CHANGE UP (ref 1.8–7.4)
NEUTROPHILS # BLD AUTO: 5.67 K/UL — SIGNIFICANT CHANGE UP (ref 1.8–7.4)
NEUTROPHILS # BLD AUTO: 5.97 K/UL — SIGNIFICANT CHANGE UP (ref 1.8–7.4)
NEUTROPHILS # BLD AUTO: 6.04 K/UL — SIGNIFICANT CHANGE UP (ref 1.8–7.4)
NEUTROPHILS # BLD AUTO: 6.31 K/UL — SIGNIFICANT CHANGE UP (ref 1.8–7.4)
NEUTROPHILS # BLD AUTO: 6.31 K/UL — SIGNIFICANT CHANGE UP (ref 1.8–7.4)
NEUTROPHILS # BLD AUTO: 6.71 K/UL — SIGNIFICANT CHANGE UP (ref 1.8–7.4)
NEUTROPHILS # BLD AUTO: 6.74 K/UL — SIGNIFICANT CHANGE UP (ref 1.8–7.4)
NEUTROPHILS # BLD AUTO: 6.86 K/UL — SIGNIFICANT CHANGE UP (ref 1.8–7.4)
NEUTROPHILS # BLD AUTO: 7.27 K/UL — SIGNIFICANT CHANGE UP (ref 1.8–7.4)
NEUTROPHILS # BLD AUTO: 7.28 K/UL — SIGNIFICANT CHANGE UP (ref 1.8–7.4)
NEUTROPHILS # BLD AUTO: 7.33 K/UL — SIGNIFICANT CHANGE UP (ref 1.8–7.4)
NEUTROPHILS # BLD AUTO: 7.5 K/UL — HIGH (ref 1.8–7.4)
NEUTROPHILS # BLD AUTO: 7.63 K/UL — HIGH (ref 1.8–7.4)
NEUTROPHILS # BLD AUTO: 7.64 K/UL — HIGH (ref 1.8–7.4)
NEUTROPHILS # BLD AUTO: 7.66 K/UL — HIGH (ref 1.8–7.4)
NEUTROPHILS # BLD AUTO: 7.68 K/UL — HIGH (ref 1.8–7.4)
NEUTROPHILS # BLD AUTO: 7.77 K/UL — HIGH (ref 1.8–7.4)
NEUTROPHILS # BLD AUTO: 7.86 K/UL — HIGH (ref 1.8–7.4)
NEUTROPHILS # BLD AUTO: 7.88 K/UL — HIGH (ref 1.8–7.4)
NEUTROPHILS # BLD AUTO: 7.92 K/UL — HIGH (ref 1.8–7.4)
NEUTROPHILS # BLD AUTO: 7.92 K/UL — HIGH (ref 1.8–7.4)
NEUTROPHILS # BLD AUTO: 7.93 K/UL — HIGH (ref 1.8–7.4)
NEUTROPHILS # BLD AUTO: 7.93 K/UL — HIGH (ref 1.8–7.4)
NEUTROPHILS # BLD AUTO: 7.96 K/UL — HIGH (ref 1.8–7.4)
NEUTROPHILS # BLD AUTO: 8.1 K/UL — HIGH (ref 1.8–7.4)
NEUTROPHILS # BLD AUTO: 8.26 K/UL — HIGH (ref 1.8–7.4)
NEUTROPHILS # BLD AUTO: 8.29 K/UL — HIGH (ref 1.8–7.4)
NEUTROPHILS # BLD AUTO: 8.45 K/UL — HIGH (ref 1.8–7.4)
NEUTROPHILS # BLD AUTO: 8.46 K/UL — HIGH (ref 1.8–7.4)
NEUTROPHILS # BLD AUTO: 8.57 K/UL — HIGH (ref 1.8–7.4)
NEUTROPHILS # BLD AUTO: 8.58 K/UL — HIGH (ref 1.8–7.4)
NEUTROPHILS # BLD AUTO: 8.59 K/UL — HIGH (ref 1.8–7.4)
NEUTROPHILS # BLD AUTO: 8.77 K/UL — HIGH (ref 1.8–7.4)
NEUTROPHILS # BLD AUTO: 8.77 K/UL — HIGH (ref 1.8–7.4)
NEUTROPHILS # BLD AUTO: 8.93 K/UL — HIGH (ref 1.8–7.4)
NEUTROPHILS # BLD AUTO: 8.93 K/UL — HIGH (ref 1.8–7.4)
NEUTROPHILS # BLD AUTO: 8.99 K/UL — HIGH (ref 1.8–7.4)
NEUTROPHILS # BLD AUTO: 9.01 K/UL — HIGH (ref 1.8–7.4)
NEUTROPHILS # BLD AUTO: 9.15 K/UL — HIGH (ref 1.8–7.4)
NEUTROPHILS # BLD AUTO: 9.22 K/UL — HIGH (ref 1.8–7.4)
NEUTROPHILS # BLD AUTO: 9.23 K/UL — HIGH (ref 1.8–7.4)
NEUTROPHILS # BLD AUTO: 9.26 K/UL — HIGH (ref 1.8–7.4)
NEUTROPHILS # BLD AUTO: 9.38 K/UL — HIGH (ref 1.8–7.4)
NEUTROPHILS # BLD AUTO: 9.53 K/UL — HIGH (ref 1.8–7.4)
NEUTROPHILS # BLD AUTO: 9.58 K/UL — HIGH (ref 1.8–7.4)
NEUTROPHILS # BLD AUTO: 9.84 K/UL — HIGH (ref 1.8–7.4)
NEUTROPHILS NFR BLD AUTO: 75.3 % — SIGNIFICANT CHANGE UP (ref 43–77)
NEUTROPHILS NFR BLD AUTO: 76.3 % — SIGNIFICANT CHANGE UP (ref 43–77)
NEUTROPHILS NFR BLD AUTO: 77.9 % — HIGH (ref 43–77)
NEUTROPHILS NFR BLD AUTO: 77.9 % — HIGH (ref 43–77)
NEUTROPHILS NFR BLD AUTO: 78.3 % — HIGH (ref 43–77)
NEUTROPHILS NFR BLD AUTO: 78.8 % — HIGH (ref 43–77)
NEUTROPHILS NFR BLD AUTO: 79.8 % — HIGH (ref 43–77)
NEUTROPHILS NFR BLD AUTO: 80.2 % — HIGH (ref 43–77)
NEUTROPHILS NFR BLD AUTO: 83.9 % — HIGH (ref 43–77)
NEUTROPHILS NFR BLD AUTO: 84.1 % — HIGH (ref 43–77)
NEUTROPHILS NFR BLD AUTO: 84.2 % — HIGH (ref 43–77)
NEUTROPHILS NFR BLD AUTO: 85.2 % — HIGH (ref 43–77)
NEUTROPHILS NFR BLD AUTO: 85.6 % — HIGH (ref 43–77)
NEUTROPHILS NFR BLD AUTO: 85.6 % — HIGH (ref 43–77)
NEUTROPHILS NFR BLD AUTO: 85.7 % — HIGH (ref 43–77)
NEUTROPHILS NFR BLD AUTO: 86 % — HIGH (ref 43–77)
NEUTROPHILS NFR BLD AUTO: 86.3 % — HIGH (ref 43–77)
NEUTROPHILS NFR BLD AUTO: 86.5 % — HIGH (ref 43–77)
NEUTROPHILS NFR BLD AUTO: 86.5 % — HIGH (ref 43–77)
NEUTROPHILS NFR BLD AUTO: 87 % — HIGH (ref 43–77)
NEUTROPHILS NFR BLD AUTO: 87.2 % — HIGH (ref 43–77)
NEUTROPHILS NFR BLD AUTO: 87.3 % — HIGH (ref 43–77)
NEUTROPHILS NFR BLD AUTO: 87.3 % — HIGH (ref 43–77)
NEUTROPHILS NFR BLD AUTO: 87.4 % — HIGH (ref 43–77)
NEUTROPHILS NFR BLD AUTO: 87.5 % — HIGH (ref 43–77)
NEUTROPHILS NFR BLD AUTO: 87.7 % — HIGH (ref 43–77)
NEUTROPHILS NFR BLD AUTO: 87.8 % — HIGH (ref 43–77)
NEUTROPHILS NFR BLD AUTO: 88.1 % — HIGH (ref 43–77)
NEUTROPHILS NFR BLD AUTO: 88.1 % — HIGH (ref 43–77)
NEUTROPHILS NFR BLD AUTO: 88.3 % — HIGH (ref 43–77)
NEUTROPHILS NFR BLD AUTO: 88.4 % — HIGH (ref 43–77)
NEUTROPHILS NFR BLD AUTO: 88.7 % — HIGH (ref 43–77)
NEUTROPHILS NFR BLD AUTO: 88.8 % — HIGH (ref 43–77)
NEUTROPHILS NFR BLD AUTO: 89.1 % — HIGH (ref 43–77)
NEUTROPHILS NFR BLD AUTO: 89.3 % — HIGH (ref 43–77)
NEUTROPHILS NFR BLD AUTO: 89.4 % — HIGH (ref 43–77)
NEUTROPHILS NFR BLD AUTO: 89.5 % — HIGH (ref 43–77)
NEUTROPHILS NFR BLD AUTO: 89.5 % — HIGH (ref 43–77)
NEUTROPHILS NFR BLD AUTO: 89.8 % — HIGH (ref 43–77)
NEUTROPHILS NFR BLD AUTO: 89.9 % — HIGH (ref 43–77)
NEUTROPHILS NFR BLD AUTO: 89.9 % — HIGH (ref 43–77)
NEUTROPHILS NFR BLD AUTO: 90 % — HIGH (ref 43–77)
NEUTROPHILS NFR BLD AUTO: 90 % — HIGH (ref 43–77)
NEUTROPHILS NFR BLD AUTO: 90.1 % — HIGH (ref 43–77)
NEUTROPHILS NFR BLD AUTO: 90.2 % — HIGH (ref 43–77)
NEUTROPHILS NFR BLD AUTO: 90.4 % — HIGH (ref 43–77)
NEUTROPHILS NFR BLD AUTO: 90.4 % — HIGH (ref 43–77)
NEUTROPHILS NFR BLD AUTO: 90.5 % — HIGH (ref 43–77)
NEUTROPHILS NFR BLD AUTO: 90.6 % — HIGH (ref 43–77)
NEUTROPHILS NFR BLD AUTO: 90.7 % — HIGH (ref 43–77)
NEUTROPHILS NFR BLD AUTO: 90.7 % — HIGH (ref 43–77)
NEUTROPHILS NFR BLD AUTO: 91 % — HIGH (ref 43–77)
NEUTROPHILS NFR BLD AUTO: 91.3 % — HIGH (ref 43–77)
NEUTROPHILS NFR BLD AUTO: 91.5 % — HIGH (ref 43–77)
NEUTROPHILS NFR BLD AUTO: 91.6 % — HIGH (ref 43–77)
NEUTROPHILS NFR BLD AUTO: 91.7 % — HIGH (ref 43–77)
NEUTROPHILS NFR BLD AUTO: 91.8 % — HIGH (ref 43–77)
NEUTROPHILS NFR BLD AUTO: 91.9 % — HIGH (ref 43–77)
NEUTROPHILS NFR BLD AUTO: 92 % — HIGH (ref 43–77)
NEUTROPHILS NFR BLD AUTO: 92 % — HIGH (ref 43–77)
NEUTROPHILS NFR BLD AUTO: 92.1 % — HIGH (ref 43–77)
NEUTROPHILS NFR BLD AUTO: 92.1 % — HIGH (ref 43–77)
NEUTROPHILS NFR BLD AUTO: 92.9 % — HIGH (ref 43–77)
NEUTROPHILS NFR BLD AUTO: 93 % — HIGH (ref 43–77)
NEUTROPHILS NFR BLD AUTO: 93.2 % — HIGH (ref 43–77)
NEUTROPHILS NFR BLD AUTO: 93.9 % — HIGH (ref 43–77)
NEUTROPHILS NFR BLD AUTO: 94.3 % — HIGH (ref 43–77)
NEUTROPHILS NFR BLD AUTO: 94.7 % — HIGH (ref 43–77)
NEUTROPHILS NFR BLD AUTO: 97.3 % — HIGH (ref 43–77)
NEUTS BAND # BLD: 0.9 % — SIGNIFICANT CHANGE UP (ref 0–8)
NEUTS BAND # BLD: 0.9 % — SIGNIFICANT CHANGE UP (ref 0–8)
NEUTS BAND # BLD: 1.7 % — SIGNIFICANT CHANGE UP (ref 0–8)
NEUTS BAND # BLD: 1.7 % — SIGNIFICANT CHANGE UP (ref 0–8)
NEUTS BAND # BLD: 14.1 % — HIGH (ref 0–8)
NEUTS BAND # BLD: 2.6 % — SIGNIFICANT CHANGE UP (ref 0–8)
NEUTS BAND # BLD: 2.6 % — SIGNIFICANT CHANGE UP (ref 0–8)
NEUTS BAND # BLD: 2.7 % — SIGNIFICANT CHANGE UP (ref 0–8)
NEUTS BAND # BLD: 6.1 % — SIGNIFICANT CHANGE UP (ref 0–8)
NEUTS BAND # BLD: 8.7 % — HIGH (ref 0–8)
NITRITE UR-MCNC: NEGATIVE — SIGNIFICANT CHANGE UP
NOROVIRUS GI+II RNA STL QL NAA+NON-PROBE: ABNORMAL
NOROVIRUS GI+II RNA STL QL NAA+NON-PROBE: ABNORMAL
NRBC # BLD: 0 /100 WBCS — SIGNIFICANT CHANGE UP (ref 0–0)
NRBC # BLD: 1 /100 WBCS — HIGH (ref 0–0)
NRBC # BLD: 2 /100 WBCS — HIGH (ref 0–0)
NRBC # BLD: 3 /100 WBCS — HIGH (ref 0–0)
NRBC BLD-RTO: 0 /100 WBCS — SIGNIFICANT CHANGE UP (ref 0–0)
NT-PROBNP SERPL-SCNC: 3583 PG/ML — HIGH (ref 0–300)
NT-PROBNP SERPL-SCNC: HIGH PG/ML (ref 0–300)
OB PNL STL: POSITIVE
ORGANISM # SPEC MICROSCOPIC CNT: ABNORMAL
OTHER CELLS CSF MANUAL: 5.7 ML/DL — LOW (ref 18–22)
OTHER CELLS CSF MANUAL: 7.3 ML/DL — LOW (ref 18–22)
OVALOCYTES BLD QL SMEAR: SLIGHT — SIGNIFICANT CHANGE UP
PCO2 BLDV: 30 MMHG — LOW (ref 42–55)
PCO2 BLDV: 32 MMHG — LOW (ref 42–55)
PCO2 BLDV: 33 MMHG — LOW (ref 42–55)
PCO2 BLDV: 33 MMHG — LOW (ref 42–55)
PCO2 BLDV: 34 MMHG — LOW (ref 42–55)
PCO2 BLDV: 35 MMHG — LOW (ref 42–55)
PCO2 BLDV: 35 MMHG — LOW (ref 42–55)
PCO2 BLDV: 37 MMHG — LOW (ref 42–55)
PCO2 BLDV: 37 MMHG — LOW (ref 42–55)
PCO2 BLDV: 38 MMHG — LOW (ref 42–55)
PCO2 BLDV: 38 MMHG — LOW (ref 42–55)
PCO2 BLDV: 39 MMHG — LOW (ref 42–55)
PCO2 BLDV: 42 MMHG — SIGNIFICANT CHANGE UP (ref 42–55)
PCO2 BLDV: 44 MMHG — SIGNIFICANT CHANGE UP (ref 42–55)
PCO2 BLDV: 55 MMHG — SIGNIFICANT CHANGE UP (ref 42–55)
PH BLDV: 7.28 — LOW (ref 7.32–7.43)
PH BLDV: 7.3 — LOW (ref 7.32–7.43)
PH BLDV: 7.31 — LOW (ref 7.32–7.43)
PH BLDV: 7.32 — SIGNIFICANT CHANGE UP (ref 7.32–7.43)
PH BLDV: 7.37 — SIGNIFICANT CHANGE UP (ref 7.32–7.43)
PH BLDV: 7.41 — SIGNIFICANT CHANGE UP (ref 7.32–7.43)
PH BLDV: 7.42 — SIGNIFICANT CHANGE UP (ref 7.32–7.43)
PH UR: 5 — SIGNIFICANT CHANGE UP (ref 5–8)
PH UR: 5.5 — SIGNIFICANT CHANGE UP (ref 5–8)
PHOSPHATE SERPL-MCNC: 1.8 MG/DL — LOW (ref 2.5–4.5)
PHOSPHATE SERPL-MCNC: 2.2 MG/DL — LOW (ref 2.5–4.5)
PHOSPHATE SERPL-MCNC: 2.3 MG/DL — LOW (ref 2.5–4.5)
PHOSPHATE SERPL-MCNC: 2.4 MG/DL — LOW (ref 2.5–4.5)
PHOSPHATE SERPL-MCNC: 2.5 MG/DL — SIGNIFICANT CHANGE UP (ref 2.5–4.5)
PHOSPHATE SERPL-MCNC: 2.5 MG/DL — SIGNIFICANT CHANGE UP (ref 2.5–4.5)
PHOSPHATE SERPL-MCNC: 2.6 MG/DL — SIGNIFICANT CHANGE UP (ref 2.5–4.5)
PHOSPHATE SERPL-MCNC: 2.7 MG/DL — SIGNIFICANT CHANGE UP (ref 2.5–4.5)
PHOSPHATE SERPL-MCNC: 2.8 MG/DL — SIGNIFICANT CHANGE UP (ref 2.5–4.5)
PHOSPHATE SERPL-MCNC: 2.9 MG/DL — SIGNIFICANT CHANGE UP (ref 2.5–4.5)
PHOSPHATE SERPL-MCNC: 3 MG/DL — SIGNIFICANT CHANGE UP (ref 2.5–4.5)
PHOSPHATE SERPL-MCNC: 3.1 MG/DL — SIGNIFICANT CHANGE UP (ref 2.5–4.5)
PHOSPHATE SERPL-MCNC: 3.2 MG/DL — SIGNIFICANT CHANGE UP (ref 2.5–4.5)
PHOSPHATE SERPL-MCNC: 3.3 MG/DL — SIGNIFICANT CHANGE UP (ref 2.5–4.5)
PHOSPHATE SERPL-MCNC: 3.4 MG/DL — SIGNIFICANT CHANGE UP (ref 2.5–4.5)
PHOSPHATE SERPL-MCNC: 3.5 MG/DL — SIGNIFICANT CHANGE UP (ref 2.5–4.5)
PHOSPHATE SERPL-MCNC: 3.6 MG/DL — SIGNIFICANT CHANGE UP (ref 2.5–4.5)
PHOSPHATE SERPL-MCNC: 3.7 MG/DL — SIGNIFICANT CHANGE UP (ref 2.5–4.5)
PHOSPHATE SERPL-MCNC: 3.8 MG/DL — SIGNIFICANT CHANGE UP (ref 2.5–4.5)
PHOSPHATE SERPL-MCNC: 3.9 MG/DL — SIGNIFICANT CHANGE UP (ref 2.5–4.5)
PHOSPHATE SERPL-MCNC: 4 MG/DL — SIGNIFICANT CHANGE UP (ref 2.5–4.5)
PHOSPHATE SERPL-MCNC: 4.2 MG/DL — SIGNIFICANT CHANGE UP (ref 2.5–4.5)
PHOSPHATE SERPL-MCNC: 4.3 MG/DL — SIGNIFICANT CHANGE UP (ref 2.5–4.5)
PHOSPHATE SERPL-MCNC: 4.3 MG/DL — SIGNIFICANT CHANGE UP (ref 2.5–4.5)
PHOSPHATE SERPL-MCNC: 4.4 MG/DL — SIGNIFICANT CHANGE UP (ref 2.5–4.5)
PHOSPHATE SERPL-MCNC: 4.6 MG/DL — HIGH (ref 2.5–4.5)
PHOSPHATE SERPL-MCNC: 5 MG/DL — HIGH (ref 2.5–4.5)
PHOSPHATE SERPL-MCNC: 5.2 MG/DL — HIGH (ref 2.5–4.5)
PHOSPHATE SERPL-MCNC: 5.3 MG/DL — HIGH (ref 2.5–4.5)
PHOSPHATE SERPL-MCNC: 5.6 MG/DL — HIGH (ref 2.5–4.5)
PHOSPHATE SERPL-MCNC: 5.9 MG/DL — HIGH (ref 2.5–4.5)
PHOSPHATE SERPL-MCNC: 6.2 MG/DL — HIGH (ref 2.5–4.5)
PHOSPHATE SERPL-MCNC: 6.4 MG/DL — HIGH (ref 2.5–4.5)
PHOSPHATE SERPL-MCNC: 7 MG/DL — HIGH (ref 2.5–4.5)
PHOSPHATE SERPL-MCNC: 7.1 MG/DL — HIGH (ref 2.5–4.5)
PLAT MORPH BLD: ABNORMAL
PLAT MORPH BLD: NORMAL — SIGNIFICANT CHANGE UP
PLATELET # BLD AUTO: 100 K/UL — LOW (ref 150–400)
PLATELET # BLD AUTO: 101 K/UL — LOW (ref 150–400)
PLATELET # BLD AUTO: 102 K/UL — LOW (ref 150–400)
PLATELET # BLD AUTO: 103 K/UL — LOW (ref 150–400)
PLATELET # BLD AUTO: 104 K/UL — LOW (ref 150–400)
PLATELET # BLD AUTO: 105 K/UL — LOW (ref 150–400)
PLATELET # BLD AUTO: 106 K/UL — LOW (ref 150–400)
PLATELET # BLD AUTO: 107 K/UL — LOW (ref 150–400)
PLATELET # BLD AUTO: 108 K/UL — LOW (ref 150–400)
PLATELET # BLD AUTO: 109 K/UL — LOW (ref 150–400)
PLATELET # BLD AUTO: 109 K/UL — LOW (ref 150–400)
PLATELET # BLD AUTO: 110 K/UL — LOW (ref 150–400)
PLATELET # BLD AUTO: 111 K/UL — LOW (ref 150–400)
PLATELET # BLD AUTO: 112 K/UL — LOW (ref 150–400)
PLATELET # BLD AUTO: 112 K/UL — LOW (ref 150–400)
PLATELET # BLD AUTO: 113 K/UL — LOW (ref 150–400)
PLATELET # BLD AUTO: 114 K/UL — LOW (ref 150–400)
PLATELET # BLD AUTO: 114 K/UL — LOW (ref 150–400)
PLATELET # BLD AUTO: 115 K/UL — LOW (ref 150–400)
PLATELET # BLD AUTO: 116 K/UL — LOW (ref 150–400)
PLATELET # BLD AUTO: 116 K/UL — LOW (ref 150–400)
PLATELET # BLD AUTO: 117 K/UL — LOW (ref 150–400)
PLATELET # BLD AUTO: 121 K/UL — LOW (ref 150–400)
PLATELET # BLD AUTO: 124 K/UL — LOW (ref 150–400)
PLATELET # BLD AUTO: 124 K/UL — LOW (ref 150–400)
PLATELET # BLD AUTO: 127 K/UL — LOW (ref 150–400)
PLATELET # BLD AUTO: 127 K/UL — LOW (ref 150–400)
PLATELET # BLD AUTO: 128 K/UL — LOW (ref 150–400)
PLATELET # BLD AUTO: 129 K/UL — LOW (ref 150–400)
PLATELET # BLD AUTO: 130 K/UL — LOW (ref 150–400)
PLATELET # BLD AUTO: 131 K/UL — LOW (ref 150–400)
PLATELET # BLD AUTO: 131 K/UL — LOW (ref 150–400)
PLATELET # BLD AUTO: 134 K/UL — LOW (ref 150–400)
PLATELET # BLD AUTO: 135 K/UL — LOW (ref 150–400)
PLATELET # BLD AUTO: 138 K/UL — LOW (ref 150–400)
PLATELET # BLD AUTO: 141 K/UL — LOW (ref 150–400)
PLATELET # BLD AUTO: 160 K/UL — SIGNIFICANT CHANGE UP (ref 150–400)
PLATELET # BLD AUTO: 161 K/UL — SIGNIFICANT CHANGE UP (ref 150–400)
PLATELET # BLD AUTO: 175 K/UL — SIGNIFICANT CHANGE UP (ref 150–400)
PLATELET # BLD AUTO: 179 K/UL — SIGNIFICANT CHANGE UP (ref 150–400)
PLATELET # BLD AUTO: 183 K/UL — SIGNIFICANT CHANGE UP (ref 150–400)
PLATELET # BLD AUTO: 193 K/UL — SIGNIFICANT CHANGE UP (ref 150–400)
PLATELET # BLD AUTO: 222 K/UL — SIGNIFICANT CHANGE UP (ref 150–400)
PLATELET # BLD AUTO: 224 K/UL — SIGNIFICANT CHANGE UP (ref 150–400)
PLATELET # BLD AUTO: 227 K/UL — SIGNIFICANT CHANGE UP (ref 150–400)
PLATELET # BLD AUTO: 232 K/UL — SIGNIFICANT CHANGE UP (ref 150–400)
PLATELET # BLD AUTO: 234 K/UL — SIGNIFICANT CHANGE UP (ref 150–400)
PLATELET # BLD AUTO: 245 K/UL — SIGNIFICANT CHANGE UP (ref 150–400)
PLATELET # BLD AUTO: 248 K/UL — SIGNIFICANT CHANGE UP (ref 150–400)
PLATELET # BLD AUTO: 257 K/UL — SIGNIFICANT CHANGE UP (ref 150–400)
PLATELET # BLD AUTO: 271 K/UL — SIGNIFICANT CHANGE UP (ref 150–400)
PLATELET # BLD AUTO: 272 K/UL — SIGNIFICANT CHANGE UP (ref 150–400)
PLATELET # BLD AUTO: 292 K/UL — SIGNIFICANT CHANGE UP (ref 150–400)
PLATELET # BLD AUTO: 313 K/UL — SIGNIFICANT CHANGE UP (ref 150–400)
PLATELET # BLD AUTO: 314 K/UL — SIGNIFICANT CHANGE UP (ref 150–400)
PLATELET # BLD AUTO: 330 K/UL — SIGNIFICANT CHANGE UP (ref 150–400)
PLATELET # BLD AUTO: 346 K/UL — SIGNIFICANT CHANGE UP (ref 150–400)
PLATELET # BLD AUTO: 354 K/UL — SIGNIFICANT CHANGE UP (ref 150–400)
PLATELET # BLD AUTO: 356 K/UL — SIGNIFICANT CHANGE UP (ref 150–400)
PLATELET # BLD AUTO: 361 K/UL — SIGNIFICANT CHANGE UP (ref 150–400)
PLATELET # BLD AUTO: 45 K/UL — LOW (ref 150–400)
PLATELET # BLD AUTO: 77 K/UL — LOW (ref 150–400)
PLATELET # BLD AUTO: 80 K/UL — LOW (ref 150–400)
PLATELET # BLD AUTO: 81 K/UL — LOW (ref 150–400)
PLATELET # BLD AUTO: 82 K/UL — LOW (ref 150–400)
PLATELET # BLD AUTO: 84 K/UL — LOW (ref 150–400)
PLATELET # BLD AUTO: 85 K/UL — LOW (ref 150–400)
PLATELET # BLD AUTO: 86 K/UL — LOW (ref 150–400)
PLATELET # BLD AUTO: 88 K/UL — LOW (ref 150–400)
PLATELET # BLD AUTO: 88 K/UL — LOW (ref 150–400)
PLATELET # BLD AUTO: 89 K/UL — LOW (ref 150–400)
PLATELET # BLD AUTO: 90 K/UL — LOW (ref 150–400)
PLATELET # BLD AUTO: 91 K/UL — LOW (ref 150–400)
PLATELET # BLD AUTO: 91 K/UL — LOW (ref 150–400)
PLATELET # BLD AUTO: 92 K/UL — LOW (ref 150–400)
PLATELET # BLD AUTO: 93 K/UL — LOW (ref 150–400)
PLATELET # BLD AUTO: 94 K/UL — LOW (ref 150–400)
PLATELET # BLD AUTO: 96 K/UL — LOW (ref 150–400)
PLATELET # BLD AUTO: 97 K/UL — LOW (ref 150–400)
PLATELET # BLD AUTO: 97 K/UL — LOW (ref 150–400)
PLATELET # BLD AUTO: 98 K/UL — LOW (ref 150–400)
PLATELET # BLD AUTO: 98 K/UL — LOW (ref 150–400)
PLATELET # BLD AUTO: 99 K/UL — LOW (ref 150–400)
PLATELET # BLD AUTO: 99 K/UL — LOW (ref 150–400)
PLATELET # BLD AUTO: SIGNIFICANT CHANGE UP (ref 150–400)
PLATELET # BLD AUTO: SIGNIFICANT CHANGE UP (ref 150–400)
PO2 BLDV: 21 MMHG — LOW (ref 25–45)
PO2 BLDV: 25 MMHG — SIGNIFICANT CHANGE UP (ref 25–45)
PO2 BLDV: 35 MMHG — SIGNIFICANT CHANGE UP (ref 25–45)
PO2 BLDV: 37 MMHG — SIGNIFICANT CHANGE UP (ref 25–45)
PO2 BLDV: 37 MMHG — SIGNIFICANT CHANGE UP (ref 25–45)
PO2 BLDV: 39 MMHG — SIGNIFICANT CHANGE UP (ref 25–45)
PO2 BLDV: 40 MMHG — SIGNIFICANT CHANGE UP (ref 25–45)
PO2 BLDV: 41 MMHG — SIGNIFICANT CHANGE UP (ref 25–45)
PO2 BLDV: 41 MMHG — SIGNIFICANT CHANGE UP (ref 25–45)
PO2 BLDV: 43 MMHG — SIGNIFICANT CHANGE UP (ref 25–45)
PO2 BLDV: 44 MMHG — SIGNIFICANT CHANGE UP (ref 25–45)
PO2 BLDV: 57 MMHG — HIGH (ref 25–45)
POIKILOCYTOSIS BLD QL AUTO: SIGNIFICANT CHANGE UP
POIKILOCYTOSIS BLD QL AUTO: SLIGHT — SIGNIFICANT CHANGE UP
POLYCHROMASIA BLD QL SMEAR: SLIGHT — SIGNIFICANT CHANGE UP
POTASSIUM BLDV-SCNC: 3.1 MMOL/L — LOW (ref 3.5–5.1)
POTASSIUM BLDV-SCNC: 3.3 MMOL/L — LOW (ref 3.5–5.1)
POTASSIUM BLDV-SCNC: 3.4 MMOL/L — LOW (ref 3.5–5.1)
POTASSIUM BLDV-SCNC: 3.4 MMOL/L — LOW (ref 3.5–5.1)
POTASSIUM BLDV-SCNC: 3.6 MMOL/L — SIGNIFICANT CHANGE UP (ref 3.5–5.1)
POTASSIUM BLDV-SCNC: 3.7 MMOL/L — SIGNIFICANT CHANGE UP (ref 3.5–5.1)
POTASSIUM BLDV-SCNC: 3.7 MMOL/L — SIGNIFICANT CHANGE UP (ref 3.5–5.1)
POTASSIUM BLDV-SCNC: 3.8 MMOL/L — SIGNIFICANT CHANGE UP (ref 3.5–5.1)
POTASSIUM BLDV-SCNC: 4.5 MMOL/L — SIGNIFICANT CHANGE UP (ref 3.5–5.1)
POTASSIUM BLDV-SCNC: 4.6 MMOL/L — SIGNIFICANT CHANGE UP (ref 3.5–5.1)
POTASSIUM BLDV-SCNC: 4.6 MMOL/L — SIGNIFICANT CHANGE UP (ref 3.5–5.1)
POTASSIUM BLDV-SCNC: 5 MMOL/L — SIGNIFICANT CHANGE UP (ref 3.5–5.1)
POTASSIUM BLDV-SCNC: 5.2 MMOL/L — HIGH (ref 3.5–5.1)
POTASSIUM SERPL-MCNC: 2.7 MMOL/L — CRITICAL LOW (ref 3.5–5.3)
POTASSIUM SERPL-MCNC: 2.8 MMOL/L — CRITICAL LOW (ref 3.5–5.3)
POTASSIUM SERPL-MCNC: 3 MMOL/L — LOW (ref 3.5–5.3)
POTASSIUM SERPL-MCNC: 3 MMOL/L — LOW (ref 3.5–5.3)
POTASSIUM SERPL-MCNC: 3.1 MMOL/L — LOW (ref 3.5–5.3)
POTASSIUM SERPL-MCNC: 3.2 MMOL/L — LOW (ref 3.5–5.3)
POTASSIUM SERPL-MCNC: 3.3 MMOL/L — LOW (ref 3.5–5.3)
POTASSIUM SERPL-MCNC: 3.4 MMOL/L — LOW (ref 3.5–5.3)
POTASSIUM SERPL-MCNC: 3.5 MMOL/L — SIGNIFICANT CHANGE UP (ref 3.5–5.3)
POTASSIUM SERPL-MCNC: 3.6 MMOL/L — SIGNIFICANT CHANGE UP (ref 3.5–5.3)
POTASSIUM SERPL-MCNC: 3.7 MMOL/L — SIGNIFICANT CHANGE UP (ref 3.5–5.3)
POTASSIUM SERPL-MCNC: 3.8 MMOL/L — SIGNIFICANT CHANGE UP (ref 3.5–5.3)
POTASSIUM SERPL-MCNC: 3.9 MMOL/L — SIGNIFICANT CHANGE UP (ref 3.5–5.3)
POTASSIUM SERPL-MCNC: 4 MMOL/L — SIGNIFICANT CHANGE UP (ref 3.5–5.3)
POTASSIUM SERPL-MCNC: 4.1 MMOL/L — SIGNIFICANT CHANGE UP (ref 3.5–5.3)
POTASSIUM SERPL-MCNC: 4.2 MMOL/L — SIGNIFICANT CHANGE UP (ref 3.5–5.3)
POTASSIUM SERPL-MCNC: 4.3 MMOL/L — SIGNIFICANT CHANGE UP (ref 3.5–5.3)
POTASSIUM SERPL-MCNC: 4.4 MMOL/L — SIGNIFICANT CHANGE UP (ref 3.5–5.3)
POTASSIUM SERPL-MCNC: 4.5 MMOL/L — SIGNIFICANT CHANGE UP (ref 3.5–5.3)
POTASSIUM SERPL-MCNC: 4.9 MMOL/L — SIGNIFICANT CHANGE UP (ref 3.5–5.3)
POTASSIUM SERPL-MCNC: 4.9 MMOL/L — SIGNIFICANT CHANGE UP (ref 3.5–5.3)
POTASSIUM SERPL-MCNC: 5 MMOL/L — SIGNIFICANT CHANGE UP (ref 3.5–5.3)
POTASSIUM SERPL-MCNC: 5.4 MMOL/L — HIGH (ref 3.5–5.3)
POTASSIUM SERPL-SCNC: 2.7 MMOL/L — CRITICAL LOW (ref 3.5–5.3)
POTASSIUM SERPL-SCNC: 2.8 MMOL/L — CRITICAL LOW (ref 3.5–5.3)
POTASSIUM SERPL-SCNC: 3 MMOL/L — LOW (ref 3.5–5.3)
POTASSIUM SERPL-SCNC: 3 MMOL/L — LOW (ref 3.5–5.3)
POTASSIUM SERPL-SCNC: 3.1 MMOL/L — LOW (ref 3.5–5.3)
POTASSIUM SERPL-SCNC: 3.2 MMOL/L — LOW (ref 3.5–5.3)
POTASSIUM SERPL-SCNC: 3.3 MMOL/L — LOW (ref 3.5–5.3)
POTASSIUM SERPL-SCNC: 3.4 MMOL/L — LOW (ref 3.5–5.3)
POTASSIUM SERPL-SCNC: 3.5 MMOL/L — SIGNIFICANT CHANGE UP (ref 3.5–5.3)
POTASSIUM SERPL-SCNC: 3.6 MMOL/L — SIGNIFICANT CHANGE UP (ref 3.5–5.3)
POTASSIUM SERPL-SCNC: 3.7 MMOL/L — SIGNIFICANT CHANGE UP (ref 3.5–5.3)
POTASSIUM SERPL-SCNC: 3.8 MMOL/L — SIGNIFICANT CHANGE UP (ref 3.5–5.3)
POTASSIUM SERPL-SCNC: 3.9 MMOL/L — SIGNIFICANT CHANGE UP (ref 3.5–5.3)
POTASSIUM SERPL-SCNC: 4 MMOL/L — SIGNIFICANT CHANGE UP (ref 3.5–5.3)
POTASSIUM SERPL-SCNC: 4.1 MMOL/L — SIGNIFICANT CHANGE UP (ref 3.5–5.3)
POTASSIUM SERPL-SCNC: 4.2 MMOL/L — SIGNIFICANT CHANGE UP (ref 3.5–5.3)
POTASSIUM SERPL-SCNC: 4.3 MMOL/L — SIGNIFICANT CHANGE UP (ref 3.5–5.3)
POTASSIUM SERPL-SCNC: 4.4 MMOL/L — SIGNIFICANT CHANGE UP (ref 3.5–5.3)
POTASSIUM SERPL-SCNC: 4.5 MMOL/L — SIGNIFICANT CHANGE UP (ref 3.5–5.3)
POTASSIUM SERPL-SCNC: 4.9 MMOL/L — SIGNIFICANT CHANGE UP (ref 3.5–5.3)
POTASSIUM SERPL-SCNC: 4.9 MMOL/L — SIGNIFICANT CHANGE UP (ref 3.5–5.3)
POTASSIUM SERPL-SCNC: 5 MMOL/L — SIGNIFICANT CHANGE UP (ref 3.5–5.3)
POTASSIUM SERPL-SCNC: 5.4 MMOL/L — HIGH (ref 3.5–5.3)
PROT SERPL-MCNC: 4.1 G/DL — LOW (ref 6–8.3)
PROT SERPL-MCNC: 4.2 G/DL — LOW (ref 6–8.3)
PROT SERPL-MCNC: 4.3 G/DL — LOW (ref 6–8.3)
PROT SERPL-MCNC: 4.4 G/DL — LOW (ref 6–8.3)
PROT SERPL-MCNC: 4.4 G/DL — LOW (ref 6–8.3)
PROT SERPL-MCNC: 4.5 G/DL — LOW (ref 6–8.3)
PROT SERPL-MCNC: 4.6 G/DL — LOW (ref 6–8.3)
PROT SERPL-MCNC: 4.7 G/DL — LOW (ref 6–8.3)
PROT SERPL-MCNC: 4.8 G/DL — LOW (ref 6–8.3)
PROT SERPL-MCNC: 4.9 G/DL — LOW (ref 6–8.3)
PROT SERPL-MCNC: 4.9 G/DL — LOW (ref 6–8.3)
PROT SERPL-MCNC: 5 G/DL — LOW (ref 6–8.3)
PROT SERPL-MCNC: 5.1 G/DL — LOW (ref 6–8.3)
PROT SERPL-MCNC: 5.2 G/DL — LOW (ref 6–8.3)
PROT SERPL-MCNC: 5.3 G/DL — LOW (ref 6–8.3)
PROT SERPL-MCNC: 5.5 G/DL — LOW (ref 6–8.3)
PROT SERPL-MCNC: 6.5 G/DL — SIGNIFICANT CHANGE UP (ref 6–8.3)
PROT SERPL-MCNC: 6.6 G/DL — SIGNIFICANT CHANGE UP (ref 6–8.3)
PROT SERPL-MCNC: 7 G/DL — SIGNIFICANT CHANGE UP (ref 6–8.3)
PROT UR-MCNC: 100 MG/DL
PROT UR-MCNC: 30 MG/DL
PROTHROM AB SERPL-ACNC: 10.6 SEC — SIGNIFICANT CHANGE UP (ref 9.5–13)
PROTHROM AB SERPL-ACNC: 10.8 SEC — SIGNIFICANT CHANGE UP (ref 9.5–13)
PROTHROM AB SERPL-ACNC: 10.8 SEC — SIGNIFICANT CHANGE UP (ref 9.5–13)
PROTHROM AB SERPL-ACNC: 10.9 SEC — SIGNIFICANT CHANGE UP (ref 9.5–13)
PROTHROM AB SERPL-ACNC: 11.1 SEC — SIGNIFICANT CHANGE UP (ref 9.5–13)
PROTHROM AB SERPL-ACNC: 11.3 SEC — SIGNIFICANT CHANGE UP (ref 9.5–13)
PROTHROM AB SERPL-ACNC: 11.3 SEC — SIGNIFICANT CHANGE UP (ref 9.5–13)
PROTHROM AB SERPL-ACNC: 11.5 SEC — SIGNIFICANT CHANGE UP (ref 9.5–13)
PROTHROM AB SERPL-ACNC: 11.8 SEC — SIGNIFICANT CHANGE UP (ref 9.5–13)
PROTHROM AB SERPL-ACNC: 11.8 SEC — SIGNIFICANT CHANGE UP (ref 9.5–13)
PROTHROM AB SERPL-ACNC: 11.9 SEC — SIGNIFICANT CHANGE UP (ref 9.5–13)
PROTHROM AB SERPL-ACNC: 12 SEC — SIGNIFICANT CHANGE UP (ref 9.5–13)
PROTHROM AB SERPL-ACNC: 12.1 SEC — SIGNIFICANT CHANGE UP (ref 9.5–13)
PROTHROM AB SERPL-ACNC: 12.1 SEC — SIGNIFICANT CHANGE UP (ref 9.5–13)
PROTHROM AB SERPL-ACNC: 12.2 SEC — SIGNIFICANT CHANGE UP (ref 9.5–13)
PROTHROM AB SERPL-ACNC: 12.2 SEC — SIGNIFICANT CHANGE UP (ref 9.5–13)
PROTHROM AB SERPL-ACNC: 12.3 SEC — SIGNIFICANT CHANGE UP (ref 9.5–13)
PROTHROM AB SERPL-ACNC: 12.4 SEC — SIGNIFICANT CHANGE UP (ref 9.5–13)
PROTHROM AB SERPL-ACNC: 12.4 SEC — SIGNIFICANT CHANGE UP (ref 9.5–13)
PROTHROM AB SERPL-ACNC: 12.5 SEC — SIGNIFICANT CHANGE UP (ref 9.5–13)
PROTHROM AB SERPL-ACNC: 12.6 SEC — SIGNIFICANT CHANGE UP (ref 9.5–13)
PROTHROM AB SERPL-ACNC: 12.6 SEC — SIGNIFICANT CHANGE UP (ref 9.5–13)
PROTHROM AB SERPL-ACNC: 12.8 SEC — SIGNIFICANT CHANGE UP (ref 9.5–13)
PROTHROM AB SERPL-ACNC: 13 SEC — SIGNIFICANT CHANGE UP (ref 9.5–13)
PROTHROM AB SERPL-ACNC: 13.3 SEC — HIGH (ref 9.5–13)
PROTHROM AB SERPL-ACNC: 13.4 SEC — HIGH (ref 9.5–13)
PROTHROM AB SERPL-ACNC: 14 SEC — HIGH (ref 9.5–13)
PROTHROM AB SERPL-ACNC: 14.5 SEC — HIGH (ref 9.5–13)
PROTHROM AB SERPL-ACNC: 14.9 SEC — HIGH (ref 9.5–13)
PROTHROM AB SERPL-ACNC: 16 SEC — HIGH (ref 9.5–13)
PROTHROM AB SERPL-ACNC: 16.3 SEC — HIGH (ref 9.5–13)
PROTHROM AB SERPL-ACNC: 17 SEC — HIGH (ref 9.5–13)
PROTHROM AB SERPL-ACNC: 17 SEC — HIGH (ref 9.5–13)
PROTHROM AB SERPL-ACNC: 22.8 SEC — HIGH (ref 9.5–13)
RAPID RVP RESULT: SIGNIFICANT CHANGE UP
RAPIDTEG MAXIMUM AMPLITUDE: 70.9 MM — HIGH (ref 52–70)
RBC # BLD: 2.08 M/UL — LOW (ref 4.2–5.8)
RBC # BLD: 2.15 M/UL — LOW (ref 4.2–5.8)
RBC # BLD: 2.19 M/UL — LOW (ref 4.2–5.8)
RBC # BLD: 2.24 M/UL — LOW (ref 4.2–5.8)
RBC # BLD: 2.37 M/UL — LOW (ref 4.2–5.8)
RBC # BLD: 2.38 M/UL — LOW (ref 4.2–5.8)
RBC # BLD: 2.4 M/UL — LOW (ref 4.2–5.8)
RBC # BLD: 2.41 M/UL — LOW (ref 4.2–5.8)
RBC # BLD: 2.43 M/UL — LOW (ref 4.2–5.8)
RBC # BLD: 2.48 M/UL — LOW (ref 4.2–5.8)
RBC # BLD: 2.49 M/UL — LOW (ref 4.2–5.8)
RBC # BLD: 2.49 M/UL — LOW (ref 4.2–5.8)
RBC # BLD: 2.51 M/UL — LOW (ref 4.2–5.8)
RBC # BLD: 2.52 M/UL — LOW (ref 4.2–5.8)
RBC # BLD: 2.53 M/UL — LOW (ref 4.2–5.8)
RBC # BLD: 2.54 M/UL — LOW (ref 4.2–5.8)
RBC # BLD: 2.55 M/UL — LOW (ref 4.2–5.8)
RBC # BLD: 2.55 M/UL — LOW (ref 4.2–5.8)
RBC # BLD: 2.56 M/UL — LOW (ref 4.2–5.8)
RBC # BLD: 2.56 M/UL — LOW (ref 4.2–5.8)
RBC # BLD: 2.57 M/UL — LOW (ref 4.2–5.8)
RBC # BLD: 2.58 M/UL — LOW (ref 4.2–5.8)
RBC # BLD: 2.6 M/UL — LOW (ref 4.2–5.8)
RBC # BLD: 2.61 M/UL — LOW (ref 4.2–5.8)
RBC # BLD: 2.63 M/UL — LOW (ref 4.2–5.8)
RBC # BLD: 2.63 M/UL — LOW (ref 4.2–5.8)
RBC # BLD: 2.64 M/UL — LOW (ref 4.2–5.8)
RBC # BLD: 2.66 M/UL — LOW (ref 4.2–5.8)
RBC # BLD: 2.67 M/UL — LOW (ref 4.2–5.8)
RBC # BLD: 2.68 M/UL — LOW (ref 4.2–5.8)
RBC # BLD: 2.68 M/UL — LOW (ref 4.2–5.8)
RBC # BLD: 2.69 M/UL — LOW (ref 4.2–5.8)
RBC # BLD: 2.7 M/UL — LOW (ref 4.2–5.8)
RBC # BLD: 2.71 M/UL — LOW (ref 4.2–5.8)
RBC # BLD: 2.71 M/UL — LOW (ref 4.2–5.8)
RBC # BLD: 2.72 M/UL — LOW (ref 4.2–5.8)
RBC # BLD: 2.75 M/UL — LOW (ref 4.2–5.8)
RBC # BLD: 2.77 M/UL — LOW (ref 4.2–5.8)
RBC # BLD: 2.78 M/UL — LOW (ref 4.2–5.8)
RBC # BLD: 2.79 M/UL — LOW (ref 4.2–5.8)
RBC # BLD: 2.79 M/UL — LOW (ref 4.2–5.8)
RBC # BLD: 2.8 M/UL — LOW (ref 4.2–5.8)
RBC # BLD: 2.83 M/UL — LOW (ref 4.2–5.8)
RBC # BLD: 2.84 M/UL — LOW (ref 4.2–5.8)
RBC # BLD: 2.9 M/UL — LOW (ref 4.2–5.8)
RBC # BLD: 2.9 M/UL — LOW (ref 4.2–5.8)
RBC # BLD: 2.91 M/UL — LOW (ref 4.2–5.8)
RBC # BLD: 2.91 M/UL — LOW (ref 4.2–5.8)
RBC # BLD: 2.94 M/UL — LOW (ref 4.2–5.8)
RBC # BLD: 2.94 M/UL — LOW (ref 4.2–5.8)
RBC # BLD: 2.95 M/UL — LOW (ref 4.2–5.8)
RBC # BLD: 2.96 M/UL — LOW (ref 4.2–5.8)
RBC # BLD: 2.97 M/UL — LOW (ref 4.2–5.8)
RBC # BLD: 2.98 M/UL — LOW (ref 4.2–5.8)
RBC # BLD: 3 M/UL — LOW (ref 4.2–5.8)
RBC # BLD: 3.01 M/UL — LOW (ref 4.2–5.8)
RBC # BLD: 3.02 M/UL — LOW (ref 4.2–5.8)
RBC # BLD: 3.03 M/UL — LOW (ref 4.2–5.8)
RBC # BLD: 3.04 M/UL — LOW (ref 4.2–5.8)
RBC # BLD: 3.04 M/UL — LOW (ref 4.2–5.8)
RBC # BLD: 3.05 M/UL — LOW (ref 4.2–5.8)
RBC # BLD: 3.06 M/UL — LOW (ref 4.2–5.8)
RBC # BLD: 3.09 M/UL — LOW (ref 4.2–5.8)
RBC # BLD: 3.13 M/UL — LOW (ref 4.2–5.8)
RBC # BLD: 3.19 M/UL — LOW (ref 4.2–5.8)
RBC # BLD: 3.2 M/UL — LOW (ref 4.2–5.8)
RBC # BLD: 3.21 M/UL — LOW (ref 4.2–5.8)
RBC # BLD: 3.28 M/UL — LOW (ref 4.2–5.8)
RBC # BLD: 3.31 M/UL — LOW (ref 4.2–5.8)
RBC # BLD: 3.36 M/UL — LOW (ref 4.2–5.8)
RBC # BLD: 3.39 M/UL — LOW (ref 4.2–5.8)
RBC # BLD: 3.4 M/UL — LOW (ref 4.2–5.8)
RBC # BLD: 3.44 M/UL — LOW (ref 4.2–5.8)
RBC # BLD: 3.44 M/UL — LOW (ref 4.2–5.8)
RBC # BLD: 3.45 M/UL — LOW (ref 4.2–5.8)
RBC # BLD: 3.45 M/UL — LOW (ref 4.2–5.8)
RBC # BLD: 3.47 M/UL — LOW (ref 4.2–5.8)
RBC # BLD: 3.48 M/UL — LOW (ref 4.2–5.8)
RBC # BLD: 3.52 M/UL — LOW (ref 4.2–5.8)
RBC # BLD: 3.54 M/UL — LOW (ref 4.2–5.8)
RBC # BLD: 3.59 M/UL — LOW (ref 4.2–5.8)
RBC # BLD: 3.64 M/UL — LOW (ref 4.2–5.8)
RBC # BLD: 3.66 M/UL — LOW (ref 4.2–5.8)
RBC # BLD: 3.67 M/UL — LOW (ref 4.2–5.8)
RBC # BLD: 3.72 M/UL — LOW (ref 4.2–5.8)
RBC # BLD: 3.79 M/UL — LOW (ref 4.2–5.8)
RBC # BLD: 3.79 M/UL — LOW (ref 4.2–5.8)
RBC # BLD: 3.81 M/UL — LOW (ref 4.2–5.8)
RBC # BLD: 3.81 M/UL — LOW (ref 4.2–5.8)
RBC # BLD: 3.85 M/UL — LOW (ref 4.2–5.8)
RBC # BLD: 3.94 M/UL — LOW (ref 4.2–5.8)
RBC # BLD: 3.95 M/UL — LOW (ref 4.2–5.8)
RBC # BLD: 4 M/UL — LOW (ref 4.2–5.8)
RBC # BLD: 4.02 M/UL — LOW (ref 4.2–5.8)
RBC # BLD: 4.07 M/UL — LOW (ref 4.2–5.8)
RBC # BLD: 4.19 M/UL — LOW (ref 4.2–5.8)
RBC # BLD: 4.22 M/UL — SIGNIFICANT CHANGE UP (ref 4.2–5.8)
RBC # BLD: 4.35 M/UL — SIGNIFICANT CHANGE UP (ref 4.2–5.8)
RBC # BLD: 4.37 M/UL — SIGNIFICANT CHANGE UP (ref 4.2–5.8)
RBC # BLD: 4.45 M/UL — SIGNIFICANT CHANGE UP (ref 4.2–5.8)
RBC # BLD: 4.61 M/UL — SIGNIFICANT CHANGE UP (ref 4.2–5.8)
RBC # BLD: 4.67 M/UL — SIGNIFICANT CHANGE UP (ref 4.2–5.8)
RBC # BLD: 4.82 M/UL — SIGNIFICANT CHANGE UP (ref 4.2–5.8)
RBC # BLD: 4.86 M/UL — SIGNIFICANT CHANGE UP (ref 4.2–5.8)
RBC # FLD: 14 % — SIGNIFICANT CHANGE UP (ref 10.3–14.5)
RBC # FLD: 14.1 % — SIGNIFICANT CHANGE UP (ref 10.3–14.5)
RBC # FLD: 14.2 % — SIGNIFICANT CHANGE UP (ref 10.3–14.5)
RBC # FLD: 14.3 % — SIGNIFICANT CHANGE UP (ref 10.3–14.5)
RBC # FLD: 14.4 % — SIGNIFICANT CHANGE UP (ref 10.3–14.5)
RBC # FLD: 14.4 % — SIGNIFICANT CHANGE UP (ref 10.3–14.5)
RBC # FLD: 14.5 % — SIGNIFICANT CHANGE UP (ref 10.3–14.5)
RBC # FLD: 14.5 % — SIGNIFICANT CHANGE UP (ref 10.3–14.5)
RBC # FLD: 14.6 % — HIGH (ref 10.3–14.5)
RBC # FLD: 14.7 % — HIGH (ref 10.3–14.5)
RBC # FLD: 14.8 % — HIGH (ref 10.3–14.5)
RBC # FLD: 15.3 % — HIGH (ref 10.3–14.5)
RBC # FLD: 15.5 % — HIGH (ref 10.3–14.5)
RBC # FLD: 15.6 % — HIGH (ref 10.3–14.5)
RBC # FLD: 15.7 % — HIGH (ref 10.3–14.5)
RBC # FLD: 15.8 % — HIGH (ref 10.3–14.5)
RBC # FLD: 15.8 % — HIGH (ref 10.3–14.5)
RBC # FLD: 15.9 % — HIGH (ref 10.3–14.5)
RBC # FLD: 16 % — HIGH (ref 10.3–14.5)
RBC # FLD: 16.1 % — HIGH (ref 10.3–14.5)
RBC # FLD: 16.1 % — HIGH (ref 10.3–14.5)
RBC # FLD: 16.3 % — HIGH (ref 10.3–14.5)
RBC # FLD: 16.4 % — HIGH (ref 10.3–14.5)
RBC # FLD: 16.5 % — HIGH (ref 10.3–14.5)
RBC # FLD: 16.9 % — HIGH (ref 10.3–14.5)
RBC # FLD: 17.3 % — HIGH (ref 10.3–14.5)
RBC # FLD: 17.4 % — HIGH (ref 10.3–14.5)
RBC # FLD: 17.6 % — HIGH (ref 10.3–14.5)
RBC # FLD: 17.6 % — HIGH (ref 10.3–14.5)
RBC # FLD: 17.7 % — HIGH (ref 10.3–14.5)
RBC # FLD: 17.8 % — HIGH (ref 10.3–14.5)
RBC # FLD: 17.8 % — HIGH (ref 10.3–14.5)
RBC # FLD: 17.9 % — HIGH (ref 10.3–14.5)
RBC # FLD: 18 % — HIGH (ref 10.3–14.5)
RBC # FLD: 18.1 % — HIGH (ref 10.3–14.5)
RBC # FLD: 18.2 % — HIGH (ref 10.3–14.5)
RBC # FLD: 18.4 % — HIGH (ref 10.3–14.5)
RBC # FLD: 18.4 % — HIGH (ref 10.3–14.5)
RBC # FLD: 18.6 % — HIGH (ref 10.3–14.5)
RBC # FLD: 18.8 % — HIGH (ref 10.3–14.5)
RBC # FLD: 18.9 % — HIGH (ref 10.3–14.5)
RBC # FLD: 18.9 % — HIGH (ref 10.3–14.5)
RBC # FLD: 19.1 % — HIGH (ref 10.3–14.5)
RBC # FLD: 19.2 % — HIGH (ref 10.3–14.5)
RBC # FLD: 19.3 % — HIGH (ref 10.3–14.5)
RBC # FLD: 19.4 % — HIGH (ref 10.3–14.5)
RBC # FLD: 19.5 % — HIGH (ref 10.3–14.5)
RBC # FLD: 19.5 % — HIGH (ref 10.3–14.5)
RBC # FLD: 19.6 % — HIGH (ref 10.3–14.5)
RBC # FLD: 19.6 % — HIGH (ref 10.3–14.5)
RBC # FLD: 19.7 % — HIGH (ref 10.3–14.5)
RBC # FLD: 19.8 % — HIGH (ref 10.3–14.5)
RBC # FLD: 19.9 % — HIGH (ref 10.3–14.5)
RBC # FLD: 20 % — HIGH (ref 10.3–14.5)
RBC # FLD: 20 % — HIGH (ref 10.3–14.5)
RBC # FLD: 20.1 % — HIGH (ref 10.3–14.5)
RBC # FLD: 20.1 % — HIGH (ref 10.3–14.5)
RBC # FLD: 20.2 % — HIGH (ref 10.3–14.5)
RBC # FLD: 20.3 % — HIGH (ref 10.3–14.5)
RBC # FLD: 20.4 % — HIGH (ref 10.3–14.5)
RBC # FLD: 20.5 % — HIGH (ref 10.3–14.5)
RBC # FLD: 20.6 % — HIGH (ref 10.3–14.5)
RBC # FLD: 20.7 % — HIGH (ref 10.3–14.5)
RBC # FLD: 20.7 % — HIGH (ref 10.3–14.5)
RBC # FLD: 20.8 % — HIGH (ref 10.3–14.5)
RBC # FLD: 20.8 % — HIGH (ref 10.3–14.5)
RBC # FLD: 21.2 % — HIGH (ref 10.3–14.5)
RBC # FLD: 21.3 % — HIGH (ref 10.3–14.5)
RBC BLD AUTO: ABNORMAL
RBC BLD AUTO: SIGNIFICANT CHANGE UP
RBC CASTS # UR COMP ASSIST: 0 /HPF — SIGNIFICANT CHANGE UP (ref 0–4)
RBC CASTS # UR COMP ASSIST: 1 /HPF — SIGNIFICANT CHANGE UP (ref 0–4)
RBC CASTS # UR COMP ASSIST: 31 /HPF — HIGH (ref 0–4)
RBC CASTS # UR COMP ASSIST: 4 /HPF — SIGNIFICANT CHANGE UP (ref 0–4)
RBC CASTS # UR COMP ASSIST: 6 /HPF — HIGH (ref 0–4)
RETICS #: 65.9 K/UL — SIGNIFICANT CHANGE UP (ref 25–125)
RETICS #: 67.6 K/UL — SIGNIFICANT CHANGE UP (ref 25–125)
RETICS/RBC NFR: 1.7 % — SIGNIFICANT CHANGE UP (ref 0.5–2.5)
RETICS/RBC NFR: 2.5 % — SIGNIFICANT CHANGE UP (ref 0.5–2.5)
REVIEW: SIGNIFICANT CHANGE UP
RH IG SCN BLD-IMP: POSITIVE — SIGNIFICANT CHANGE UP
RSV RNA NPH QL NAA+NON-PROBE: SIGNIFICANT CHANGE UP
RSV RNA NPH QL NAA+NON-PROBE: SIGNIFICANT CHANGE UP
S AUREUS DNA NOSE QL NAA+PROBE: DETECTED
SAO2 % BLDV: 20.1 % — LOW (ref 67–88)
SAO2 % BLDV: 29.9 % — LOW (ref 67–88)
SAO2 % BLDV: 56.5 % — LOW (ref 67–88)
SAO2 % BLDV: 61.5 % — LOW (ref 67–88)
SAO2 % BLDV: 61.9 % — LOW (ref 67–88)
SAO2 % BLDV: 62.1 % — LOW (ref 67–88)
SAO2 % BLDV: 62.7 % — LOW (ref 67–88)
SAO2 % BLDV: 65.5 % — LOW (ref 67–88)
SAO2 % BLDV: 65.7 % — LOW (ref 67–88)
SAO2 % BLDV: 67 % — SIGNIFICANT CHANGE UP (ref 67–88)
SAO2 % BLDV: 67.7 % — SIGNIFICANT CHANGE UP (ref 67–88)
SAO2 % BLDV: 68 % — SIGNIFICANT CHANGE UP (ref 67–88)
SAO2 % BLDV: 68.3 % — SIGNIFICANT CHANGE UP (ref 67–88)
SAO2 % BLDV: 72.6 % — SIGNIFICANT CHANGE UP (ref 67–88)
SAO2 % BLDV: 89.8 % — HIGH (ref 67–88)
SARS-COV-2 RNA SPEC QL NAA+PROBE: SIGNIFICANT CHANGE UP
SODIUM SERPL-SCNC: 136 MMOL/L — SIGNIFICANT CHANGE UP (ref 135–145)
SODIUM SERPL-SCNC: 136 MMOL/L — SIGNIFICANT CHANGE UP (ref 135–145)
SODIUM SERPL-SCNC: 137 MMOL/L — SIGNIFICANT CHANGE UP (ref 135–145)
SODIUM SERPL-SCNC: 137 MMOL/L — SIGNIFICANT CHANGE UP (ref 135–145)
SODIUM SERPL-SCNC: 138 MMOL/L — SIGNIFICANT CHANGE UP (ref 135–145)
SODIUM SERPL-SCNC: 139 MMOL/L — SIGNIFICANT CHANGE UP (ref 135–145)
SODIUM SERPL-SCNC: 140 MMOL/L — SIGNIFICANT CHANGE UP (ref 135–145)
SODIUM SERPL-SCNC: 141 MMOL/L — SIGNIFICANT CHANGE UP (ref 135–145)
SODIUM SERPL-SCNC: 142 MMOL/L — SIGNIFICANT CHANGE UP (ref 135–145)
SODIUM SERPL-SCNC: 143 MMOL/L — SIGNIFICANT CHANGE UP (ref 135–145)
SODIUM SERPL-SCNC: 144 MMOL/L — SIGNIFICANT CHANGE UP (ref 135–145)
SODIUM SERPL-SCNC: 145 MMOL/L — SIGNIFICANT CHANGE UP (ref 135–145)
SODIUM SERPL-SCNC: 146 MMOL/L — HIGH (ref 135–145)
SODIUM SERPL-SCNC: 147 MMOL/L — HIGH (ref 135–145)
SODIUM SERPL-SCNC: 148 MMOL/L — HIGH (ref 135–145)
SODIUM SERPL-SCNC: 149 MMOL/L — HIGH (ref 135–145)
SODIUM SERPL-SCNC: 150 MMOL/L — HIGH (ref 135–145)
SODIUM SERPL-SCNC: 151 MMOL/L — HIGH (ref 135–145)
SODIUM SERPL-SCNC: 152 MMOL/L — HIGH (ref 135–145)
SODIUM UR-SCNC: 17 MMOL/L — SIGNIFICANT CHANGE UP
SP GR SPEC: 1.02 — SIGNIFICANT CHANGE UP (ref 1–1.03)
SP GR SPEC: 1.03 — SIGNIFICANT CHANGE UP (ref 1–1.03)
SP GR SPEC: >1.03 — HIGH (ref 1–1.03)
SPECIMEN SOURCE: SIGNIFICANT CHANGE UP
SQUAMOUS # UR AUTO: 4 /HPF — SIGNIFICANT CHANGE UP (ref 0–5)
SQUAMOUS # UR AUTO: 5 /HPF — SIGNIFICANT CHANGE UP (ref 0–5)
SQUAMOUS # UR AUTO: 7 /HPF — HIGH (ref 0–5)
SQUAMOUS # UR AUTO: 7 /HPF — HIGH (ref 0–5)
SQUAMOUS # UR AUTO: 9 /HPF — HIGH (ref 0–5)
T4 FREE SERPL-MCNC: 1.6 NG/DL — SIGNIFICANT CHANGE UP (ref 0.9–1.8)
TARGETS BLD QL SMEAR: SLIGHT — SIGNIFICANT CHANGE UP
TEG FUNCTIONAL FIBRINOGEN: 51.1 MM — HIGH (ref 15–32)
TEG MAXIMUM AMPLITUDE: 70.2 MM — HIGH (ref 52–69)
TEG REACTION TIME: 11.4 MIN — HIGH (ref 4.6–9.1)
TIBC SERPL-MCNC: 138 UG/DL — LOW (ref 220–430)
TROPONIN T, HIGH SENSITIVITY RESULT: 100 NG/L — HIGH (ref 0–51)
TROPONIN T, HIGH SENSITIVITY RESULT: 118 NG/L — HIGH (ref 0–51)
TROPONIN T, HIGH SENSITIVITY RESULT: 122 NG/L — HIGH (ref 0–51)
TROPONIN T, HIGH SENSITIVITY RESULT: 129 NG/L — HIGH (ref 0–51)
TROPONIN T, HIGH SENSITIVITY RESULT: 132 NG/L — HIGH (ref 0–51)
TROPONIN T, HIGH SENSITIVITY RESULT: 186 NG/L — HIGH (ref 0–51)
TROPONIN T, HIGH SENSITIVITY RESULT: 198 NG/L — HIGH (ref 0–51)
TROPONIN T, HIGH SENSITIVITY RESULT: 221 NG/L — HIGH (ref 0–51)
TROPONIN T, HIGH SENSITIVITY RESULT: 225 NG/L — HIGH (ref 0–51)
TROPONIN T, HIGH SENSITIVITY RESULT: 263 NG/L — HIGH (ref 0–51)
TROPONIN T, HIGH SENSITIVITY RESULT: 294 NG/L — HIGH (ref 0–51)
TROPONIN T, HIGH SENSITIVITY RESULT: 342 NG/L — HIGH (ref 0–51)
TROPONIN T, HIGH SENSITIVITY RESULT: 354 NG/L — HIGH (ref 0–51)
TROPONIN T, HIGH SENSITIVITY RESULT: 370 NG/L — HIGH (ref 0–51)
TROPONIN T, HIGH SENSITIVITY RESULT: 79 NG/L — HIGH (ref 0–51)
TROPONIN T, HIGH SENSITIVITY RESULT: 86 NG/L — HIGH (ref 0–51)
TSH SERPL-MCNC: 2.47 UIU/ML — SIGNIFICANT CHANGE UP (ref 0.27–4.2)
UIBC SERPL-MCNC: 48 UG/DL — LOW (ref 110–370)
UROBILINOGEN FLD QL: 0.2 MG/DL — SIGNIFICANT CHANGE UP (ref 0.2–1)
UROBILINOGEN FLD QL: 1 MG/DL — SIGNIFICANT CHANGE UP (ref 0.2–1)
UROBILINOGEN FLD QL: 1 MG/DL — SIGNIFICANT CHANGE UP (ref 0.2–1)
UUN UR-MCNC: 730 MG/DL — SIGNIFICANT CHANGE UP
VIT B12 SERPL-MCNC: 996 PG/ML — SIGNIFICANT CHANGE UP (ref 232–1245)
WBC # BLD: 10.03 K/UL — SIGNIFICANT CHANGE UP (ref 3.8–10.5)
WBC # BLD: 10.08 K/UL — SIGNIFICANT CHANGE UP (ref 3.8–10.5)
WBC # BLD: 10.16 K/UL — SIGNIFICANT CHANGE UP (ref 3.8–10.5)
WBC # BLD: 10.41 K/UL — SIGNIFICANT CHANGE UP (ref 3.8–10.5)
WBC # BLD: 10.61 K/UL — HIGH (ref 3.8–10.5)
WBC # BLD: 10.74 K/UL — HIGH (ref 3.8–10.5)
WBC # BLD: 10.81 K/UL — HIGH (ref 3.8–10.5)
WBC # BLD: 10.83 K/UL — HIGH (ref 3.8–10.5)
WBC # BLD: 10.84 K/UL — HIGH (ref 3.8–10.5)
WBC # BLD: 10.86 K/UL — HIGH (ref 3.8–10.5)
WBC # BLD: 11.26 K/UL — HIGH (ref 3.8–10.5)
WBC # BLD: 11.31 K/UL — HIGH (ref 3.8–10.5)
WBC # BLD: 11.38 K/UL — HIGH (ref 3.8–10.5)
WBC # BLD: 11.43 K/UL — HIGH (ref 3.8–10.5)
WBC # BLD: 11.45 K/UL — HIGH (ref 3.8–10.5)
WBC # BLD: 11.64 K/UL — HIGH (ref 3.8–10.5)
WBC # BLD: 11.66 K/UL — HIGH (ref 3.8–10.5)
WBC # BLD: 11.84 K/UL — HIGH (ref 3.8–10.5)
WBC # BLD: 12.08 K/UL — HIGH (ref 3.8–10.5)
WBC # BLD: 12.1 K/UL — HIGH (ref 3.8–10.5)
WBC # BLD: 12.21 K/UL — HIGH (ref 3.8–10.5)
WBC # BLD: 12.31 K/UL — HIGH (ref 3.8–10.5)
WBC # BLD: 12.56 K/UL — HIGH (ref 3.8–10.5)
WBC # BLD: 12.67 K/UL — HIGH (ref 3.8–10.5)
WBC # BLD: 12.77 K/UL — HIGH (ref 3.8–10.5)
WBC # BLD: 12.89 K/UL — HIGH (ref 3.8–10.5)
WBC # BLD: 13.41 K/UL — HIGH (ref 3.8–10.5)
WBC # BLD: 13.58 K/UL — HIGH (ref 3.8–10.5)
WBC # BLD: 13.97 K/UL — HIGH (ref 3.8–10.5)
WBC # BLD: 14 K/UL — HIGH (ref 3.8–10.5)
WBC # BLD: 14.13 K/UL — HIGH (ref 3.8–10.5)
WBC # BLD: 14.19 K/UL — HIGH (ref 3.8–10.5)
WBC # BLD: 14.23 K/UL — HIGH (ref 3.8–10.5)
WBC # BLD: 14.65 K/UL — HIGH (ref 3.8–10.5)
WBC # BLD: 14.94 K/UL — HIGH (ref 3.8–10.5)
WBC # BLD: 15.02 K/UL — HIGH (ref 3.8–10.5)
WBC # BLD: 15.07 K/UL — HIGH (ref 3.8–10.5)
WBC # BLD: 15.8 K/UL — HIGH (ref 3.8–10.5)
WBC # BLD: 16.03 K/UL — HIGH (ref 3.8–10.5)
WBC # BLD: 16.14 K/UL — HIGH (ref 3.8–10.5)
WBC # BLD: 16.25 K/UL — HIGH (ref 3.8–10.5)
WBC # BLD: 16.38 K/UL — HIGH (ref 3.8–10.5)
WBC # BLD: 16.53 K/UL — HIGH (ref 3.8–10.5)
WBC # BLD: 16.64 K/UL — HIGH (ref 3.8–10.5)
WBC # BLD: 17.06 K/UL — HIGH (ref 3.8–10.5)
WBC # BLD: 17.51 K/UL — HIGH (ref 3.8–10.5)
WBC # BLD: 17.7 K/UL — HIGH (ref 3.8–10.5)
WBC # BLD: 19.26 K/UL — HIGH (ref 3.8–10.5)
WBC # BLD: 19.4 K/UL — HIGH (ref 3.8–10.5)
WBC # BLD: 3.42 K/UL — LOW (ref 3.8–10.5)
WBC # BLD: 4.65 K/UL — SIGNIFICANT CHANGE UP (ref 3.8–10.5)
WBC # BLD: 5.26 K/UL — SIGNIFICANT CHANGE UP (ref 3.8–10.5)
WBC # BLD: 5.71 K/UL — SIGNIFICANT CHANGE UP (ref 3.8–10.5)
WBC # BLD: 5.87 K/UL — SIGNIFICANT CHANGE UP (ref 3.8–10.5)
WBC # BLD: 6.36 K/UL — SIGNIFICANT CHANGE UP (ref 3.8–10.5)
WBC # BLD: 6.42 K/UL — SIGNIFICANT CHANGE UP (ref 3.8–10.5)
WBC # BLD: 6.59 K/UL — SIGNIFICANT CHANGE UP (ref 3.8–10.5)
WBC # BLD: 6.69 K/UL — SIGNIFICANT CHANGE UP (ref 3.8–10.5)
WBC # BLD: 6.72 K/UL — SIGNIFICANT CHANGE UP (ref 3.8–10.5)
WBC # BLD: 6.81 K/UL — SIGNIFICANT CHANGE UP (ref 3.8–10.5)
WBC # BLD: 6.85 K/UL — SIGNIFICANT CHANGE UP (ref 3.8–10.5)
WBC # BLD: 7 K/UL — SIGNIFICANT CHANGE UP (ref 3.8–10.5)
WBC # BLD: 7.02 K/UL — SIGNIFICANT CHANGE UP (ref 3.8–10.5)
WBC # BLD: 7.27 K/UL — SIGNIFICANT CHANGE UP (ref 3.8–10.5)
WBC # BLD: 7.31 K/UL — SIGNIFICANT CHANGE UP (ref 3.8–10.5)
WBC # BLD: 7.46 K/UL — SIGNIFICANT CHANGE UP (ref 3.8–10.5)
WBC # BLD: 7.75 K/UL — SIGNIFICANT CHANGE UP (ref 3.8–10.5)
WBC # BLD: 7.8 K/UL — SIGNIFICANT CHANGE UP (ref 3.8–10.5)
WBC # BLD: 7.82 K/UL — SIGNIFICANT CHANGE UP (ref 3.8–10.5)
WBC # BLD: 7.86 K/UL — SIGNIFICANT CHANGE UP (ref 3.8–10.5)
WBC # BLD: 8 K/UL — SIGNIFICANT CHANGE UP (ref 3.8–10.5)
WBC # BLD: 8.03 K/UL — SIGNIFICANT CHANGE UP (ref 3.8–10.5)
WBC # BLD: 8.07 K/UL — SIGNIFICANT CHANGE UP (ref 3.8–10.5)
WBC # BLD: 8.19 K/UL — SIGNIFICANT CHANGE UP (ref 3.8–10.5)
WBC # BLD: 8.2 K/UL — SIGNIFICANT CHANGE UP (ref 3.8–10.5)
WBC # BLD: 8.27 K/UL — SIGNIFICANT CHANGE UP (ref 3.8–10.5)
WBC # BLD: 8.29 K/UL — SIGNIFICANT CHANGE UP (ref 3.8–10.5)
WBC # BLD: 8.36 K/UL — SIGNIFICANT CHANGE UP (ref 3.8–10.5)
WBC # BLD: 8.49 K/UL — SIGNIFICANT CHANGE UP (ref 3.8–10.5)
WBC # BLD: 8.49 K/UL — SIGNIFICANT CHANGE UP (ref 3.8–10.5)
WBC # BLD: 8.66 K/UL — SIGNIFICANT CHANGE UP (ref 3.8–10.5)
WBC # BLD: 8.7 K/UL — SIGNIFICANT CHANGE UP (ref 3.8–10.5)
WBC # BLD: 8.72 K/UL — SIGNIFICANT CHANGE UP (ref 3.8–10.5)
WBC # BLD: 8.74 K/UL — SIGNIFICANT CHANGE UP (ref 3.8–10.5)
WBC # BLD: 8.74 K/UL — SIGNIFICANT CHANGE UP (ref 3.8–10.5)
WBC # BLD: 8.75 K/UL — SIGNIFICANT CHANGE UP (ref 3.8–10.5)
WBC # BLD: 8.77 K/UL — SIGNIFICANT CHANGE UP (ref 3.8–10.5)
WBC # BLD: 8.77 K/UL — SIGNIFICANT CHANGE UP (ref 3.8–10.5)
WBC # BLD: 8.79 K/UL — SIGNIFICANT CHANGE UP (ref 3.8–10.5)
WBC # BLD: 8.84 K/UL — SIGNIFICANT CHANGE UP (ref 3.8–10.5)
WBC # BLD: 8.86 K/UL — SIGNIFICANT CHANGE UP (ref 3.8–10.5)
WBC # BLD: 8.98 K/UL — SIGNIFICANT CHANGE UP (ref 3.8–10.5)
WBC # BLD: 8.99 K/UL — SIGNIFICANT CHANGE UP (ref 3.8–10.5)
WBC # BLD: 9.05 K/UL — SIGNIFICANT CHANGE UP (ref 3.8–10.5)
WBC # BLD: 9.06 K/UL — SIGNIFICANT CHANGE UP (ref 3.8–10.5)
WBC # BLD: 9.06 K/UL — SIGNIFICANT CHANGE UP (ref 3.8–10.5)
WBC # BLD: 9.07 K/UL — SIGNIFICANT CHANGE UP (ref 3.8–10.5)
WBC # BLD: 9.13 K/UL — SIGNIFICANT CHANGE UP (ref 3.8–10.5)
WBC # BLD: 9.15 K/UL — SIGNIFICANT CHANGE UP (ref 3.8–10.5)
WBC # BLD: 9.22 K/UL — SIGNIFICANT CHANGE UP (ref 3.8–10.5)
WBC # BLD: 9.42 K/UL — SIGNIFICANT CHANGE UP (ref 3.8–10.5)
WBC # BLD: 9.46 K/UL — SIGNIFICANT CHANGE UP (ref 3.8–10.5)
WBC # BLD: 9.55 K/UL — SIGNIFICANT CHANGE UP (ref 3.8–10.5)
WBC # BLD: 9.55 K/UL — SIGNIFICANT CHANGE UP (ref 3.8–10.5)
WBC # BLD: 9.59 K/UL — SIGNIFICANT CHANGE UP (ref 3.8–10.5)
WBC # BLD: 9.66 K/UL — SIGNIFICANT CHANGE UP (ref 3.8–10.5)
WBC # BLD: 9.73 K/UL — SIGNIFICANT CHANGE UP (ref 3.8–10.5)
WBC # BLD: 9.74 K/UL — SIGNIFICANT CHANGE UP (ref 3.8–10.5)
WBC # BLD: 9.8 K/UL — SIGNIFICANT CHANGE UP (ref 3.8–10.5)
WBC # BLD: 9.83 K/UL — SIGNIFICANT CHANGE UP (ref 3.8–10.5)
WBC # BLD: 9.85 K/UL — SIGNIFICANT CHANGE UP (ref 3.8–10.5)
WBC # BLD: 9.86 K/UL — SIGNIFICANT CHANGE UP (ref 3.8–10.5)
WBC # BLD: 9.87 K/UL — SIGNIFICANT CHANGE UP (ref 3.8–10.5)
WBC # BLD: 9.95 K/UL — SIGNIFICANT CHANGE UP (ref 3.8–10.5)
WBC # BLD: 9.96 K/UL — SIGNIFICANT CHANGE UP (ref 3.8–10.5)
WBC # FLD AUTO: 10.03 K/UL — SIGNIFICANT CHANGE UP (ref 3.8–10.5)
WBC # FLD AUTO: 10.08 K/UL — SIGNIFICANT CHANGE UP (ref 3.8–10.5)
WBC # FLD AUTO: 10.16 K/UL — SIGNIFICANT CHANGE UP (ref 3.8–10.5)
WBC # FLD AUTO: 10.41 K/UL — SIGNIFICANT CHANGE UP (ref 3.8–10.5)
WBC # FLD AUTO: 10.61 K/UL — HIGH (ref 3.8–10.5)
WBC # FLD AUTO: 10.74 K/UL — HIGH (ref 3.8–10.5)
WBC # FLD AUTO: 10.81 K/UL — HIGH (ref 3.8–10.5)
WBC # FLD AUTO: 10.83 K/UL — HIGH (ref 3.8–10.5)
WBC # FLD AUTO: 10.84 K/UL — HIGH (ref 3.8–10.5)
WBC # FLD AUTO: 10.86 K/UL — HIGH (ref 3.8–10.5)
WBC # FLD AUTO: 11.26 K/UL — HIGH (ref 3.8–10.5)
WBC # FLD AUTO: 11.31 K/UL — HIGH (ref 3.8–10.5)
WBC # FLD AUTO: 11.38 K/UL — HIGH (ref 3.8–10.5)
WBC # FLD AUTO: 11.43 K/UL — HIGH (ref 3.8–10.5)
WBC # FLD AUTO: 11.45 K/UL — HIGH (ref 3.8–10.5)
WBC # FLD AUTO: 11.64 K/UL — HIGH (ref 3.8–10.5)
WBC # FLD AUTO: 11.66 K/UL — HIGH (ref 3.8–10.5)
WBC # FLD AUTO: 11.84 K/UL — HIGH (ref 3.8–10.5)
WBC # FLD AUTO: 12.08 K/UL — HIGH (ref 3.8–10.5)
WBC # FLD AUTO: 12.1 K/UL — HIGH (ref 3.8–10.5)
WBC # FLD AUTO: 12.21 K/UL — HIGH (ref 3.8–10.5)
WBC # FLD AUTO: 12.31 K/UL — HIGH (ref 3.8–10.5)
WBC # FLD AUTO: 12.56 K/UL — HIGH (ref 3.8–10.5)
WBC # FLD AUTO: 12.67 K/UL — HIGH (ref 3.8–10.5)
WBC # FLD AUTO: 12.77 K/UL — HIGH (ref 3.8–10.5)
WBC # FLD AUTO: 12.89 K/UL — HIGH (ref 3.8–10.5)
WBC # FLD AUTO: 13.41 K/UL — HIGH (ref 3.8–10.5)
WBC # FLD AUTO: 13.58 K/UL — HIGH (ref 3.8–10.5)
WBC # FLD AUTO: 13.97 K/UL — HIGH (ref 3.8–10.5)
WBC # FLD AUTO: 14 K/UL — HIGH (ref 3.8–10.5)
WBC # FLD AUTO: 14.13 K/UL — HIGH (ref 3.8–10.5)
WBC # FLD AUTO: 14.19 K/UL — HIGH (ref 3.8–10.5)
WBC # FLD AUTO: 14.23 K/UL — HIGH (ref 3.8–10.5)
WBC # FLD AUTO: 14.65 K/UL — HIGH (ref 3.8–10.5)
WBC # FLD AUTO: 14.94 K/UL — HIGH (ref 3.8–10.5)
WBC # FLD AUTO: 15.02 K/UL — HIGH (ref 3.8–10.5)
WBC # FLD AUTO: 15.07 K/UL — HIGH (ref 3.8–10.5)
WBC # FLD AUTO: 15.8 K/UL — HIGH (ref 3.8–10.5)
WBC # FLD AUTO: 16.03 K/UL — HIGH (ref 3.8–10.5)
WBC # FLD AUTO: 16.14 K/UL — HIGH (ref 3.8–10.5)
WBC # FLD AUTO: 16.25 K/UL — HIGH (ref 3.8–10.5)
WBC # FLD AUTO: 16.38 K/UL — HIGH (ref 3.8–10.5)
WBC # FLD AUTO: 16.53 K/UL — HIGH (ref 3.8–10.5)
WBC # FLD AUTO: 16.64 K/UL — HIGH (ref 3.8–10.5)
WBC # FLD AUTO: 17.06 K/UL — HIGH (ref 3.8–10.5)
WBC # FLD AUTO: 17.51 K/UL — HIGH (ref 3.8–10.5)
WBC # FLD AUTO: 17.7 K/UL — HIGH (ref 3.8–10.5)
WBC # FLD AUTO: 19.26 K/UL — HIGH (ref 3.8–10.5)
WBC # FLD AUTO: 19.4 K/UL — HIGH (ref 3.8–10.5)
WBC # FLD AUTO: 3.42 K/UL — LOW (ref 3.8–10.5)
WBC # FLD AUTO: 4.65 K/UL — SIGNIFICANT CHANGE UP (ref 3.8–10.5)
WBC # FLD AUTO: 5.26 K/UL — SIGNIFICANT CHANGE UP (ref 3.8–10.5)
WBC # FLD AUTO: 5.71 K/UL — SIGNIFICANT CHANGE UP (ref 3.8–10.5)
WBC # FLD AUTO: 5.87 K/UL — SIGNIFICANT CHANGE UP (ref 3.8–10.5)
WBC # FLD AUTO: 6.36 K/UL — SIGNIFICANT CHANGE UP (ref 3.8–10.5)
WBC # FLD AUTO: 6.42 K/UL — SIGNIFICANT CHANGE UP (ref 3.8–10.5)
WBC # FLD AUTO: 6.59 K/UL — SIGNIFICANT CHANGE UP (ref 3.8–10.5)
WBC # FLD AUTO: 6.69 K/UL — SIGNIFICANT CHANGE UP (ref 3.8–10.5)
WBC # FLD AUTO: 6.72 K/UL — SIGNIFICANT CHANGE UP (ref 3.8–10.5)
WBC # FLD AUTO: 6.81 K/UL — SIGNIFICANT CHANGE UP (ref 3.8–10.5)
WBC # FLD AUTO: 6.85 K/UL — SIGNIFICANT CHANGE UP (ref 3.8–10.5)
WBC # FLD AUTO: 7 K/UL — SIGNIFICANT CHANGE UP (ref 3.8–10.5)
WBC # FLD AUTO: 7.02 K/UL — SIGNIFICANT CHANGE UP (ref 3.8–10.5)
WBC # FLD AUTO: 7.27 K/UL — SIGNIFICANT CHANGE UP (ref 3.8–10.5)
WBC # FLD AUTO: 7.31 K/UL — SIGNIFICANT CHANGE UP (ref 3.8–10.5)
WBC # FLD AUTO: 7.46 K/UL — SIGNIFICANT CHANGE UP (ref 3.8–10.5)
WBC # FLD AUTO: 7.75 K/UL — SIGNIFICANT CHANGE UP (ref 3.8–10.5)
WBC # FLD AUTO: 7.8 K/UL — SIGNIFICANT CHANGE UP (ref 3.8–10.5)
WBC # FLD AUTO: 7.82 K/UL — SIGNIFICANT CHANGE UP (ref 3.8–10.5)
WBC # FLD AUTO: 7.86 K/UL — SIGNIFICANT CHANGE UP (ref 3.8–10.5)
WBC # FLD AUTO: 8 K/UL — SIGNIFICANT CHANGE UP (ref 3.8–10.5)
WBC # FLD AUTO: 8.03 K/UL — SIGNIFICANT CHANGE UP (ref 3.8–10.5)
WBC # FLD AUTO: 8.07 K/UL — SIGNIFICANT CHANGE UP (ref 3.8–10.5)
WBC # FLD AUTO: 8.19 K/UL — SIGNIFICANT CHANGE UP (ref 3.8–10.5)
WBC # FLD AUTO: 8.2 K/UL — SIGNIFICANT CHANGE UP (ref 3.8–10.5)
WBC # FLD AUTO: 8.27 K/UL — SIGNIFICANT CHANGE UP (ref 3.8–10.5)
WBC # FLD AUTO: 8.29 K/UL — SIGNIFICANT CHANGE UP (ref 3.8–10.5)
WBC # FLD AUTO: 8.36 K/UL — SIGNIFICANT CHANGE UP (ref 3.8–10.5)
WBC # FLD AUTO: 8.49 K/UL — SIGNIFICANT CHANGE UP (ref 3.8–10.5)
WBC # FLD AUTO: 8.49 K/UL — SIGNIFICANT CHANGE UP (ref 3.8–10.5)
WBC # FLD AUTO: 8.66 K/UL — SIGNIFICANT CHANGE UP (ref 3.8–10.5)
WBC # FLD AUTO: 8.7 K/UL — SIGNIFICANT CHANGE UP (ref 3.8–10.5)
WBC # FLD AUTO: 8.72 K/UL — SIGNIFICANT CHANGE UP (ref 3.8–10.5)
WBC # FLD AUTO: 8.74 K/UL — SIGNIFICANT CHANGE UP (ref 3.8–10.5)
WBC # FLD AUTO: 8.74 K/UL — SIGNIFICANT CHANGE UP (ref 3.8–10.5)
WBC # FLD AUTO: 8.75 K/UL — SIGNIFICANT CHANGE UP (ref 3.8–10.5)
WBC # FLD AUTO: 8.77 K/UL — SIGNIFICANT CHANGE UP (ref 3.8–10.5)
WBC # FLD AUTO: 8.77 K/UL — SIGNIFICANT CHANGE UP (ref 3.8–10.5)
WBC # FLD AUTO: 8.79 K/UL — SIGNIFICANT CHANGE UP (ref 3.8–10.5)
WBC # FLD AUTO: 8.84 K/UL — SIGNIFICANT CHANGE UP (ref 3.8–10.5)
WBC # FLD AUTO: 8.86 K/UL — SIGNIFICANT CHANGE UP (ref 3.8–10.5)
WBC # FLD AUTO: 8.98 K/UL — SIGNIFICANT CHANGE UP (ref 3.8–10.5)
WBC # FLD AUTO: 8.99 K/UL — SIGNIFICANT CHANGE UP (ref 3.8–10.5)
WBC # FLD AUTO: 9.05 K/UL — SIGNIFICANT CHANGE UP (ref 3.8–10.5)
WBC # FLD AUTO: 9.06 K/UL — SIGNIFICANT CHANGE UP (ref 3.8–10.5)
WBC # FLD AUTO: 9.06 K/UL — SIGNIFICANT CHANGE UP (ref 3.8–10.5)
WBC # FLD AUTO: 9.07 K/UL — SIGNIFICANT CHANGE UP (ref 3.8–10.5)
WBC # FLD AUTO: 9.13 K/UL — SIGNIFICANT CHANGE UP (ref 3.8–10.5)
WBC # FLD AUTO: 9.15 K/UL — SIGNIFICANT CHANGE UP (ref 3.8–10.5)
WBC # FLD AUTO: 9.22 K/UL — SIGNIFICANT CHANGE UP (ref 3.8–10.5)
WBC # FLD AUTO: 9.42 K/UL — SIGNIFICANT CHANGE UP (ref 3.8–10.5)
WBC # FLD AUTO: 9.46 K/UL — SIGNIFICANT CHANGE UP (ref 3.8–10.5)
WBC # FLD AUTO: 9.55 K/UL — SIGNIFICANT CHANGE UP (ref 3.8–10.5)
WBC # FLD AUTO: 9.55 K/UL — SIGNIFICANT CHANGE UP (ref 3.8–10.5)
WBC # FLD AUTO: 9.59 K/UL — SIGNIFICANT CHANGE UP (ref 3.8–10.5)
WBC # FLD AUTO: 9.66 K/UL — SIGNIFICANT CHANGE UP (ref 3.8–10.5)
WBC # FLD AUTO: 9.73 K/UL — SIGNIFICANT CHANGE UP (ref 3.8–10.5)
WBC # FLD AUTO: 9.74 K/UL — SIGNIFICANT CHANGE UP (ref 3.8–10.5)
WBC # FLD AUTO: 9.8 K/UL — SIGNIFICANT CHANGE UP (ref 3.8–10.5)
WBC # FLD AUTO: 9.83 K/UL — SIGNIFICANT CHANGE UP (ref 3.8–10.5)
WBC # FLD AUTO: 9.85 K/UL — SIGNIFICANT CHANGE UP (ref 3.8–10.5)
WBC # FLD AUTO: 9.86 K/UL — SIGNIFICANT CHANGE UP (ref 3.8–10.5)
WBC # FLD AUTO: 9.87 K/UL — SIGNIFICANT CHANGE UP (ref 3.8–10.5)
WBC # FLD AUTO: 9.95 K/UL — SIGNIFICANT CHANGE UP (ref 3.8–10.5)
WBC # FLD AUTO: 9.96 K/UL — SIGNIFICANT CHANGE UP (ref 3.8–10.5)
WBC UR QL: 2 /HPF — SIGNIFICANT CHANGE UP (ref 0–5)
WBC UR QL: 2 /HPF — SIGNIFICANT CHANGE UP (ref 0–5)
WBC UR QL: 4 /HPF — SIGNIFICANT CHANGE UP (ref 0–5)
WBC UR QL: 8 /HPF — HIGH (ref 0–5)
WBC UR QL: 85 /HPF — HIGH (ref 0–5)
YEAST-LIKE CELLS: PRESENT
YEAST-LIKE CELLS: PRESENT

## 2024-01-01 PROCEDURE — 99239 HOSP IP/OBS DSCHRG MGMT >30: CPT

## 2024-01-01 PROCEDURE — 93458 L HRT ARTERY/VENTRICLE ANGIO: CPT

## 2024-01-01 PROCEDURE — C1887: CPT

## 2024-01-01 PROCEDURE — 71045 X-RAY EXAM CHEST 1 VIEW: CPT

## 2024-01-01 PROCEDURE — 99233 SBSQ HOSP IP/OBS HIGH 50: CPT

## 2024-01-01 PROCEDURE — 99233 SBSQ HOSP IP/OBS HIGH 50: CPT | Mod: GC

## 2024-01-01 PROCEDURE — 74177 CT ABD & PELVIS W/CONTRAST: CPT | Mod: 26

## 2024-01-01 PROCEDURE — 99291 CRITICAL CARE FIRST HOUR: CPT | Mod: 25

## 2024-01-01 PROCEDURE — 99232 SBSQ HOSP IP/OBS MODERATE 35: CPT | Mod: GC

## 2024-01-01 PROCEDURE — 99233 SBSQ HOSP IP/OBS HIGH 50: CPT | Mod: 25

## 2024-01-01 PROCEDURE — 93308 TTE F-UP OR LMTD: CPT | Mod: 26,GC

## 2024-01-01 PROCEDURE — 71045 X-RAY EXAM CHEST 1 VIEW: CPT | Mod: 26

## 2024-01-01 PROCEDURE — 76604 US EXAM CHEST: CPT | Mod: 26,GC

## 2024-01-01 PROCEDURE — 43753 TX GASTRO INTUB W/ASP: CPT | Mod: GC,59

## 2024-01-01 PROCEDURE — 93010 ELECTROCARDIOGRAM REPORT: CPT

## 2024-01-01 PROCEDURE — 87637 SARSCOV2&INF A&B&RSV AMP PRB: CPT

## 2024-01-01 PROCEDURE — 37243 VASC EMBOLIZE/OCCLUDE ORGAN: CPT

## 2024-01-01 PROCEDURE — 93308 TTE F-UP OR LMTD: CPT

## 2024-01-01 PROCEDURE — 82803 BLOOD GASES ANY COMBINATION: CPT

## 2024-01-01 PROCEDURE — 99232 SBSQ HOSP IP/OBS MODERATE 35: CPT

## 2024-01-01 PROCEDURE — 74174 CTA ABD&PLVS W/CONTRAST: CPT | Mod: 26

## 2024-01-01 PROCEDURE — 76604 US EXAM CHEST: CPT | Mod: 26

## 2024-01-01 PROCEDURE — 80048 BASIC METABOLIC PNL TOTAL CA: CPT

## 2024-01-01 PROCEDURE — 82435 ASSAY OF BLOOD CHLORIDE: CPT

## 2024-01-01 PROCEDURE — 99223 1ST HOSP IP/OBS HIGH 75: CPT

## 2024-01-01 PROCEDURE — 99291 CRITICAL CARE FIRST HOUR: CPT

## 2024-01-01 PROCEDURE — 31575 DIAGNOSTIC LARYNGOSCOPY: CPT

## 2024-01-01 PROCEDURE — 76937 US GUIDE VASCULAR ACCESS: CPT | Mod: 26

## 2024-01-01 PROCEDURE — 93970 EXTREMITY STUDY: CPT | Mod: 26

## 2024-01-01 PROCEDURE — 85652 RBC SED RATE AUTOMATED: CPT

## 2024-01-01 PROCEDURE — 82947 ASSAY GLUCOSE BLOOD QUANT: CPT

## 2024-01-01 PROCEDURE — 76700 US EXAM ABDOM COMPLETE: CPT | Mod: 26

## 2024-01-01 PROCEDURE — 97530 THERAPEUTIC ACTIVITIES: CPT

## 2024-01-01 PROCEDURE — 71045 X-RAY EXAM CHEST 1 VIEW: CPT | Mod: 26,77

## 2024-01-01 PROCEDURE — 99223 1ST HOSP IP/OBS HIGH 75: CPT | Mod: GC,25

## 2024-01-01 PROCEDURE — 82330 ASSAY OF CALCIUM: CPT

## 2024-01-01 PROCEDURE — 99222 1ST HOSP IP/OBS MODERATE 55: CPT | Mod: FS,25

## 2024-01-01 PROCEDURE — 85018 HEMOGLOBIN: CPT

## 2024-01-01 PROCEDURE — 84295 ASSAY OF SERUM SODIUM: CPT

## 2024-01-01 PROCEDURE — 76376 3D RENDER W/INTRP POSTPROCES: CPT | Mod: 26

## 2024-01-01 PROCEDURE — 99291 CRITICAL CARE FIRST HOUR: CPT | Mod: FS

## 2024-01-01 PROCEDURE — 36556 INSERT NON-TUNNEL CV CATH: CPT | Mod: GC

## 2024-01-01 PROCEDURE — 93306 TTE W/DOPPLER COMPLETE: CPT | Mod: 26

## 2024-01-01 PROCEDURE — 99498 ADVNCD CARE PLAN ADDL 30 MIN: CPT | Mod: 25

## 2024-01-01 PROCEDURE — 93356 MYOCRD STRAIN IMG SPCKL TRCK: CPT

## 2024-01-01 PROCEDURE — 83605 ASSAY OF LACTIC ACID: CPT

## 2024-01-01 PROCEDURE — 99497 ADVNCD CARE PLAN 30 MIN: CPT | Mod: 25

## 2024-01-01 PROCEDURE — 99222 1ST HOSP IP/OBS MODERATE 55: CPT

## 2024-01-01 PROCEDURE — 43255 EGD CONTROL BLEEDING ANY: CPT | Mod: GC

## 2024-01-01 PROCEDURE — 85014 HEMATOCRIT: CPT

## 2024-01-01 PROCEDURE — 84484 ASSAY OF TROPONIN QUANT: CPT

## 2024-01-01 PROCEDURE — 87641 MR-STAPH DNA AMP PROBE: CPT

## 2024-01-01 PROCEDURE — 37244 VASC EMBOLIZE/OCCLUDE BLEED: CPT

## 2024-01-01 PROCEDURE — 83880 ASSAY OF NATRIURETIC PEPTIDE: CPT

## 2024-01-01 PROCEDURE — 99221 1ST HOSP IP/OBS SF/LOW 40: CPT | Mod: GC

## 2024-01-01 PROCEDURE — 31500 INSERT EMERGENCY AIRWAY: CPT | Mod: GC

## 2024-01-01 PROCEDURE — 83735 ASSAY OF MAGNESIUM: CPT

## 2024-01-01 PROCEDURE — 36620 INSERTION CATHETER ARTERY: CPT

## 2024-01-01 PROCEDURE — 85610 PROTHROMBIN TIME: CPT

## 2024-01-01 PROCEDURE — 97161 PT EVAL LOW COMPLEX 20 MIN: CPT

## 2024-01-01 PROCEDURE — 85025 COMPLETE CBC W/AUTO DIFF WBC: CPT

## 2024-01-01 PROCEDURE — 99222 1ST HOSP IP/OBS MODERATE 55: CPT | Mod: GC

## 2024-01-01 PROCEDURE — 74230 X-RAY XM SWLNG FUNCJ C+: CPT | Mod: 26

## 2024-01-01 PROCEDURE — 85027 COMPLETE CBC AUTOMATED: CPT

## 2024-01-01 PROCEDURE — 99221 1ST HOSP IP/OBS SF/LOW 40: CPT

## 2024-01-01 PROCEDURE — 86140 C-REACTIVE PROTEIN: CPT

## 2024-01-01 PROCEDURE — 74018 RADEX ABDOMEN 1 VIEW: CPT | Mod: 26

## 2024-01-01 PROCEDURE — 84132 ASSAY OF SERUM POTASSIUM: CPT

## 2024-01-01 PROCEDURE — 99231 SBSQ HOSP IP/OBS SF/LOW 25: CPT

## 2024-01-01 PROCEDURE — 99285 EMERGENCY DEPT VISIT HI MDM: CPT | Mod: FS,GC

## 2024-01-01 PROCEDURE — 80053 COMPREHEN METABOLIC PANEL: CPT

## 2024-01-01 PROCEDURE — 87640 STAPH A DNA AMP PROBE: CPT

## 2024-01-01 PROCEDURE — 51703 INSERT BLADDER CATH COMPLEX: CPT

## 2024-01-01 PROCEDURE — 82962 GLUCOSE BLOOD TEST: CPT

## 2024-01-01 PROCEDURE — 76705 ECHO EXAM OF ABDOMEN: CPT | Mod: 26

## 2024-01-01 PROCEDURE — 81001 URINALYSIS AUTO W/SCOPE: CPT

## 2024-01-01 PROCEDURE — 99285 EMERGENCY DEPT VISIT HI MDM: CPT | Mod: GC

## 2024-01-01 PROCEDURE — 99291 CRITICAL CARE FIRST HOUR: CPT | Mod: FS,25

## 2024-01-01 PROCEDURE — 43255 EGD CONTROL BLEEDING ANY: CPT

## 2024-01-01 PROCEDURE — 36415 COLL VENOUS BLD VENIPUNCTURE: CPT

## 2024-01-01 PROCEDURE — 99285 EMERGENCY DEPT VISIT HI MDM: CPT | Mod: 25

## 2024-01-01 PROCEDURE — 93005 ELECTROCARDIOGRAM TRACING: CPT

## 2024-01-01 PROCEDURE — C1769: CPT

## 2024-01-01 PROCEDURE — 85730 THROMBOPLASTIN TIME PARTIAL: CPT

## 2024-01-01 PROCEDURE — 93458 L HRT ARTERY/VENTRICLE ANGIO: CPT | Mod: 26

## 2024-01-01 PROCEDURE — 99152 MOD SED SAME PHYS/QHP 5/>YRS: CPT

## 2024-01-01 PROCEDURE — 93308 TTE F-UP OR LMTD: CPT | Mod: 26

## 2024-01-01 PROCEDURE — 84100 ASSAY OF PHOSPHORUS: CPT

## 2024-01-01 PROCEDURE — 36247 INS CATH ABD/L-EXT ART 3RD: CPT | Mod: RT

## 2024-01-01 PROCEDURE — 99292 CRITICAL CARE ADDL 30 MIN: CPT | Mod: 25

## 2024-01-01 DEVICE — GEL PURASTAT 3ML: Type: IMPLANTABLE DEVICE | Status: FUNCTIONAL

## 2024-01-01 DEVICE — RETRIEVAL FOOD BOLUS ROTHNET: Type: IMPLANTABLE DEVICE | Status: FUNCTIONAL

## 2024-01-01 DEVICE — CLIP RESOLUTION 360 ULTRA 235CM 20/BX: Type: IMPLANTABLE DEVICE | Status: FUNCTIONAL

## 2024-01-01 RX ORDER — SODIUM BICARBONATE 84 MG/ML
0.3 INJECTION, SOLUTION INTRAVENOUS
Qty: 150 | Refills: 0 | Status: DISCONTINUED | OUTPATIENT
Start: 2024-01-01 | End: 2024-01-01

## 2024-01-01 RX ORDER — MIDODRINE HYDROCHLORIDE 5 MG/1
20 TABLET ORAL EVERY 8 HOURS
Refills: 0 | Status: DISCONTINUED | OUTPATIENT
Start: 2024-01-01 | End: 2024-01-01

## 2024-01-01 RX ORDER — CALCIUM GLUCONATE 61(648) MG
2 TABLET ORAL ONCE
Refills: 0 | Status: COMPLETED | OUTPATIENT
Start: 2024-01-01 | End: 2024-01-01

## 2024-01-01 RX ORDER — ALBUMIN (HUMAN) 5 G/20ML
250 SOLUTION INTRAVENOUS ONCE
Refills: 0 | Status: DISCONTINUED | OUTPATIENT
Start: 2024-01-01 | End: 2024-01-01

## 2024-01-01 RX ORDER — POTASSIUM CHLORIDE 10 MEQ
20 TABLET, EXT RELEASE, PARTICLES/CRYSTALS ORAL ONCE
Refills: 0 | Status: COMPLETED | OUTPATIENT
Start: 2024-01-01 | End: 2024-01-01

## 2024-01-01 RX ORDER — BUMETANIDE 2 MG/1
2 TABLET ORAL ONCE
Refills: 0 | Status: COMPLETED | OUTPATIENT
Start: 2024-01-01 | End: 2024-01-01

## 2024-01-01 RX ORDER — FOLIC ACID 1 MG/1
1 TABLET ORAL
Refills: 0 | DISCHARGE

## 2024-01-01 RX ORDER — BUMETANIDE 2 MG/1
2 TABLET ORAL
Refills: 0 | Status: DISCONTINUED | OUTPATIENT
Start: 2024-01-01 | End: 2024-01-01

## 2024-01-01 RX ORDER — POTASSIUM CHLORIDE 10 MEQ
20 TABLET, EXT RELEASE, PARTICLES/CRYSTALS ORAL
Refills: 0 | Status: COMPLETED | OUTPATIENT
Start: 2024-01-01 | End: 2024-01-01

## 2024-01-01 RX ORDER — DOXAZOSIN MESYLATE 1 MG
2 TABLET ORAL AT BEDTIME
Refills: 0 | Status: DISCONTINUED | OUTPATIENT
Start: 2024-01-01 | End: 2024-01-01

## 2024-01-01 RX ORDER — ONDANSETRON 2 MG/ML
4 INJECTION, SOLUTION INTRAMUSCULAR; INTRAVENOUS ONCE
Refills: 0 | Status: DISCONTINUED | OUTPATIENT
Start: 2024-01-01 | End: 2024-01-01

## 2024-01-01 RX ORDER — ROSUVASTATIN CALCIUM 20 MG/1
1 TABLET, FILM COATED ORAL
Refills: 0 | DISCHARGE

## 2024-01-01 RX ORDER — SOTALOL HYDROCHLORIDE 120 MG/1
40 TABLET ORAL EVERY 24 HOURS
Refills: 0 | Status: DISCONTINUED | OUTPATIENT
Start: 2024-01-01 | End: 2024-01-01

## 2024-01-01 RX ORDER — DROXIDOPA 300 UG/1
300 CAPSULE ORAL EVERY 8 HOURS
Refills: 0 | Status: DISCONTINUED | OUTPATIENT
Start: 2024-01-01 | End: 2024-01-01

## 2024-01-01 RX ORDER — POTASSIUM CHLORIDE 10 MEQ
40 TABLET, EXT RELEASE, PARTICLES/CRYSTALS ORAL ONCE
Refills: 0 | Status: COMPLETED | OUTPATIENT
Start: 2024-01-01 | End: 2024-01-01

## 2024-01-01 RX ORDER — DIGOXIN 0.12 MG/1
125 TABLET ORAL DAILY
Refills: 0 | Status: DISCONTINUED | OUTPATIENT
Start: 2024-01-01 | End: 2024-01-01

## 2024-01-01 RX ORDER — VASOPRESSIN 20 [USP'U]/ML
0.04 INJECTION INTRAVENOUS
Qty: 40 | Refills: 0 | Status: DISCONTINUED | OUTPATIENT
Start: 2024-01-01 | End: 2024-01-01

## 2024-01-01 RX ORDER — POTASSIUM CHLORIDE 10 MEQ
10 TABLET, EXT RELEASE, PARTICLES/CRYSTALS ORAL
Refills: 0 | Status: COMPLETED | OUTPATIENT
Start: 2024-01-01 | End: 2024-01-01

## 2024-01-01 RX ORDER — OXYCODONE HYDROCHLORIDE 15 MG/1
0.5 TABLET ORAL
Qty: 450 | Refills: 0 | Status: DISCONTINUED | OUTPATIENT
Start: 2024-01-01 | End: 2024-01-01

## 2024-01-01 RX ORDER — FOLIC ACID 1 MG
1 TABLET ORAL DAILY
Refills: 0 | Status: DISCONTINUED | OUTPATIENT
Start: 2024-01-01 | End: 2024-01-01

## 2024-01-01 RX ORDER — MIDODRINE HYDROCHLORIDE 5 MG/1
10 TABLET ORAL EVERY 8 HOURS
Refills: 0 | Status: DISCONTINUED | OUTPATIENT
Start: 2024-01-01 | End: 2024-01-01

## 2024-01-01 RX ORDER — ALBUMIN (HUMAN) 5 G/20ML
250 SOLUTION INTRAVENOUS ONCE
Refills: 0 | Status: COMPLETED | OUTPATIENT
Start: 2024-01-01 | End: 2024-01-01

## 2024-01-01 RX ORDER — DEXMEDETOMIDINE HYDROCHLORIDE IN 0.9% SODIUM CHLORIDE 4 UG/ML
4 INJECTION INTRAVENOUS
Qty: 200 | Refills: 0 | Status: DISCONTINUED | OUTPATIENT
Start: 2024-01-01 | End: 2024-01-01

## 2024-01-01 RX ORDER — DEXMEDETOMIDINE HYDROCHLORIDE IN 0.9% SODIUM CHLORIDE 4 UG/ML
0.1 INJECTION INTRAVENOUS
Qty: 200 | Refills: 0 | Status: DISCONTINUED | OUTPATIENT
Start: 2024-01-01 | End: 2024-01-01

## 2024-01-01 RX ORDER — CHLOROTHIAZIDE 500 MG
500 TABLET ORAL ONCE
Refills: 0 | Status: COMPLETED | OUTPATIENT
Start: 2024-01-01 | End: 2024-01-01

## 2024-01-01 RX ORDER — ALBUMIN (HUMAN) 5 G/20ML
100 SOLUTION INTRAVENOUS ONCE
Refills: 0 | Status: COMPLETED | OUTPATIENT
Start: 2024-01-01 | End: 2024-01-01

## 2024-01-01 RX ORDER — METOPROLOL TARTRATE 100 MG/1
25 TABLET ORAL
Refills: 0 | Status: DISCONTINUED | OUTPATIENT
Start: 2024-01-01 | End: 2024-01-01

## 2024-01-01 RX ORDER — FUROSEMIDE 40 MG
40 TABLET ORAL ONCE
Refills: 0 | Status: COMPLETED | OUTPATIENT
Start: 2024-01-01 | End: 2024-01-01

## 2024-01-01 RX ORDER — ROPIVACAINE IN 0.9% SOD CHL/PF 0.1 %
0.05 PLASTIC BAG, INJECTION (ML) EPIDURAL
Qty: 8 | Refills: 0 | Status: DISCONTINUED | OUTPATIENT
Start: 2024-01-01 | End: 2024-01-01

## 2024-01-01 RX ORDER — PANTOPRAZOLE SODIUM 40 MG
40 TABLET, DELAYED RELEASE (ENTERIC COATED) ORAL EVERY 12 HOURS
Refills: 0 | Status: DISCONTINUED | OUTPATIENT
Start: 2024-01-01 | End: 2024-01-01

## 2024-01-01 RX ORDER — PIPERACILLIN SODIUM AND TAZOBACTAM SODIUM 3; .375 G/15ML; G/15ML
3.38 INJECTION, POWDER, FOR SOLUTION INTRAVENOUS ONCE
Refills: 0 | Status: COMPLETED | OUTPATIENT
Start: 2024-01-01 | End: 2024-01-01

## 2024-01-01 RX ORDER — CHLORHEXIDINE GLUCONATE 40 MG/ML
1 SOLUTION TOPICAL
Refills: 0 | Status: DISCONTINUED | OUTPATIENT
Start: 2024-01-01 | End: 2024-01-01

## 2024-01-01 RX ORDER — CANGRELOR 50 MG/1
0.75 INJECTION, POWDER, LYOPHILIZED, FOR SOLUTION INTRAVENOUS
Qty: 50 | Refills: 0 | Status: DISCONTINUED | OUTPATIENT
Start: 2024-01-01 | End: 2024-01-01

## 2024-01-01 RX ORDER — HYDROMORPHONE HYDROCHLORIDE 2 MG/1
0.5 TABLET ORAL ONCE
Refills: 0 | Status: DISCONTINUED | OUTPATIENT
Start: 2024-01-01 | End: 2024-01-01

## 2024-01-01 RX ORDER — AMOXICILLIN AND CLAVULANATE POTASSIUM 500; 125 MG/1; MG/1
1 TABLET, FILM COATED ORAL EVERY 12 HOURS
Refills: 0 | Status: COMPLETED | OUTPATIENT
Start: 2024-01-01 | End: 2024-01-01

## 2024-01-01 RX ORDER — COLCHICINE 0.6 MG
0.6 TABLET ORAL DAILY
Refills: 0 | Status: DISCONTINUED | OUTPATIENT
Start: 2024-01-01 | End: 2024-01-01

## 2024-01-01 RX ORDER — CEFEPIME 2 G/1
INJECTION, POWDER, FOR SOLUTION INTRAVENOUS
Refills: 0 | Status: DISCONTINUED | OUTPATIENT
Start: 2024-01-01 | End: 2024-01-01

## 2024-01-01 RX ORDER — KETOROLAC TROMETHAMINE 30 MG/ML
15 INJECTION, SOLUTION INTRAMUSCULAR ONCE
Refills: 0 | Status: DISCONTINUED | OUTPATIENT
Start: 2024-01-01 | End: 2024-01-01

## 2024-01-01 RX ORDER — OCTREOTIDE ACETATE 100 UG/ML
250 INJECTION, SOLUTION INTRAVENOUS; SUBCUTANEOUS THREE TIMES A DAY
Refills: 0 | Status: DISCONTINUED | OUTPATIENT
Start: 2024-01-01 | End: 2024-01-01

## 2024-01-01 RX ORDER — SODIUM PHOSPHATE, DIBASIC, ANHYDROUS, POTASSIUM PHOSPHATE, MONOBASIC, AND SODIUM PHOSPHATE, MONOBASIC, MONOHYDRATE 852; 155; 130 MG/1; MG/1; MG/1
1 TABLET, COATED ORAL ONCE
Refills: 0 | Status: COMPLETED | OUTPATIENT
Start: 2024-01-01 | End: 2024-01-01

## 2024-01-01 RX ORDER — POTASSIUM CHLORIDE 10 MEQ
10 TABLET, EXT RELEASE, PARTICLES/CRYSTALS ORAL ONCE
Refills: 0 | Status: COMPLETED | OUTPATIENT
Start: 2024-01-01 | End: 2024-01-01

## 2024-01-01 RX ORDER — CALCIUM CARBONATE 500(1250)
1 TABLET ORAL ONCE
Refills: 0 | Status: COMPLETED | OUTPATIENT
Start: 2024-01-01 | End: 2024-01-01

## 2024-01-01 RX ORDER — POTASSIUM CHLORIDE 10 MEQ
10 TABLET, EXT RELEASE, PARTICLES/CRYSTALS ORAL
Refills: 0 | Status: DISCONTINUED | OUTPATIENT
Start: 2024-01-01 | End: 2024-01-01

## 2024-01-01 RX ORDER — SODIUM BICARBONATE 84 MG/ML
1300 INJECTION, SOLUTION INTRAVENOUS THREE TIMES A DAY
Refills: 0 | Status: DISCONTINUED | OUTPATIENT
Start: 2024-01-01 | End: 2024-01-01

## 2024-01-01 RX ORDER — PANTOPRAZOLE SODIUM 40 MG
8 TABLET, DELAYED RELEASE (ENTERIC COATED) ORAL
Qty: 80 | Refills: 0 | Status: DISCONTINUED | OUTPATIENT
Start: 2024-01-01 | End: 2024-01-01

## 2024-01-01 RX ORDER — HEPARIN SODIUM 1000 [USP'U]/ML
4000 INJECTION INTRAVENOUS; SUBCUTANEOUS EVERY 6 HOURS
Refills: 0 | Status: DISCONTINUED | OUTPATIENT
Start: 2024-01-01 | End: 2024-01-01

## 2024-01-01 RX ORDER — ALBUMIN (HUMAN) 5 G/20ML
500 SOLUTION INTRAVENOUS ONCE
Refills: 0 | Status: COMPLETED | OUTPATIENT
Start: 2024-01-01 | End: 2024-01-01

## 2024-01-01 RX ORDER — GUAIFENESIN 100 MG/5ML
1200 LIQUID ORAL EVERY 12 HOURS
Refills: 0 | Status: DISCONTINUED | OUTPATIENT
Start: 2024-01-01 | End: 2024-01-01

## 2024-01-01 RX ORDER — DIGOXIN 0.12 MG/1
250 TABLET ORAL EVERY 6 HOURS
Refills: 0 | Status: COMPLETED | OUTPATIENT
Start: 2024-01-01 | End: 2024-01-01

## 2024-01-01 RX ORDER — OXYCODONE HYDROCHLORIDE 15 MG/1
150 TABLET ORAL ONCE
Refills: 0 | Status: COMPLETED | OUTPATIENT
Start: 2024-01-01 | End: 2024-01-01

## 2024-01-01 RX ORDER — THIAMINE HCL 250 MG
100 TABLET ORAL DAILY
Refills: 0 | Status: DISCONTINUED | OUTPATIENT
Start: 2024-01-01 | End: 2024-01-01

## 2024-01-01 RX ORDER — EZETIMIBE 10 MG/1
1 TABLET ORAL
Refills: 0 | DISCHARGE

## 2024-01-01 RX ORDER — POTASSIUM PHOSPHATE 236; 224 MG/ML; MG/ML
30 INJECTION, SOLUTION INTRAVENOUS ONCE
Refills: 0 | Status: DISCONTINUED | OUTPATIENT
Start: 2024-01-01 | End: 2024-01-01

## 2024-01-01 RX ORDER — CEFEPIME 2 G/1
2000 INJECTION, POWDER, FOR SOLUTION INTRAVENOUS ONCE
Refills: 0 | Status: COMPLETED | OUTPATIENT
Start: 2024-01-01 | End: 2024-01-01

## 2024-01-01 RX ORDER — POTASSIUM CHLORIDE 10 MEQ
40 TABLET, EXT RELEASE, PARTICLES/CRYSTALS ORAL EVERY 4 HOURS
Refills: 0 | Status: DISCONTINUED | OUTPATIENT
Start: 2024-01-01 | End: 2024-01-01

## 2024-01-01 RX ORDER — BUMETANIDE 2 MG/1
2 TABLET ORAL
Refills: 0 | Status: COMPLETED | OUTPATIENT
Start: 2024-01-01 | End: 2024-01-01

## 2024-01-01 RX ORDER — SOTALOL HYDROCHLORIDE 120 MG/1
0.5 TABLET ORAL
Qty: 15 | Refills: 0
Start: 2024-01-01 | End: 2024-01-01

## 2024-01-01 RX ORDER — POTASSIUM CHLORIDE 10 MEQ
40 TABLET, EXT RELEASE, PARTICLES/CRYSTALS ORAL
Refills: 0 | Status: COMPLETED | OUTPATIENT
Start: 2024-01-01 | End: 2024-01-01

## 2024-01-01 RX ORDER — CLOPIDOGREL BISULFATE 75 MG/1
1 TABLET, FILM COATED ORAL
Refills: 0 | DISCHARGE

## 2024-01-01 RX ORDER — CHLORHEXIDINE GLUCONATE 40 MG/ML
15 SOLUTION TOPICAL EVERY 12 HOURS
Refills: 0 | Status: DISCONTINUED | OUTPATIENT
Start: 2024-01-01 | End: 2024-01-01

## 2024-01-01 RX ORDER — PIPERACILLIN SODIUM AND TAZOBACTAM SODIUM 3; .375 G/15ML; G/15ML
3.38 INJECTION, POWDER, FOR SOLUTION INTRAVENOUS ONCE
Refills: 0 | Status: DISCONTINUED | OUTPATIENT
Start: 2024-01-01 | End: 2024-01-01

## 2024-01-01 RX ORDER — ROPIVACAINE IN 0.9% SOD CHL/PF 0.1 %
0.05 PLASTIC BAG, INJECTION (ML) EPIDURAL
Qty: 16 | Refills: 0 | Status: DISCONTINUED | OUTPATIENT
Start: 2024-01-01 | End: 2024-01-01

## 2024-01-01 RX ORDER — ALBUMIN (HUMAN) 5 G/20ML
50 SOLUTION INTRAVENOUS EVERY 8 HOURS
Refills: 0 | Status: COMPLETED | OUTPATIENT
Start: 2024-01-01 | End: 2024-01-01

## 2024-01-01 RX ORDER — CEFEPIME 2 G/1
2000 INJECTION, POWDER, FOR SOLUTION INTRAVENOUS EVERY 12 HOURS
Refills: 0 | Status: DISCONTINUED | OUTPATIENT
Start: 2024-01-01 | End: 2024-01-01

## 2024-01-01 RX ORDER — METOPROLOL TARTRATE 100 MG/1
12.5 TABLET ORAL EVERY 12 HOURS
Refills: 0 | Status: DISCONTINUED | OUTPATIENT
Start: 2024-01-01 | End: 2024-01-01

## 2024-01-01 RX ORDER — DEXTROSE 15 G/33 G
25 GEL IN PACKET (GRAM) ORAL ONCE
Refills: 0 | Status: DISCONTINUED | OUTPATIENT
Start: 2024-01-01 | End: 2024-01-01

## 2024-01-01 RX ORDER — MIDAZOLAM HYDROCHLORIDE 5 MG/ML
4 INJECTION, SOLUTION INTRAMUSCULAR; INTRAVENOUS ONCE
Refills: 0 | Status: DISCONTINUED | OUTPATIENT
Start: 2024-01-01 | End: 2024-01-01

## 2024-01-01 RX ORDER — METOCLOPRAMIDE HCL 5 MG
10 TABLET ORAL ONCE
Refills: 0 | Status: COMPLETED | OUTPATIENT
Start: 2024-01-01 | End: 2024-01-01

## 2024-01-01 RX ORDER — MIDODRINE HYDROCHLORIDE 5 MG/1
30 TABLET ORAL EVERY 8 HOURS
Refills: 0 | Status: DISCONTINUED | OUTPATIENT
Start: 2024-01-01 | End: 2024-01-01

## 2024-01-01 RX ORDER — DEXTROSE 15 G/33 G
12.5 GEL IN PACKET (GRAM) ORAL ONCE
Refills: 0 | Status: DISCONTINUED | OUTPATIENT
Start: 2024-01-01 | End: 2024-01-01

## 2024-01-01 RX ORDER — LORAZEPAM 4 MG/ML
1 INJECTION INTRAMUSCULAR; INTRAVENOUS ONCE
Refills: 0 | Status: DISCONTINUED | OUTPATIENT
Start: 2024-01-01 | End: 2024-01-01

## 2024-01-01 RX ORDER — POTASSIUM CHLORIDE 10 MEQ
40 TABLET, EXT RELEASE, PARTICLES/CRYSTALS ORAL ONCE
Refills: 0 | Status: DISCONTINUED | OUTPATIENT
Start: 2024-01-01 | End: 2024-01-01

## 2024-01-01 RX ORDER — VANCOMYCIN/0.9 % SOD CHLORIDE 1.75G/25
1000 PLASTIC BAG, INJECTION (ML) INTRAVENOUS ONCE
Refills: 0 | Status: COMPLETED | OUTPATIENT
Start: 2024-01-01 | End: 2024-01-01

## 2024-01-01 RX ORDER — POTASSIUM CHLORIDE 10 MEQ
40 TABLET, EXT RELEASE, PARTICLES/CRYSTALS ORAL EVERY 4 HOURS
Refills: 0 | Status: COMPLETED | OUTPATIENT
Start: 2024-01-01 | End: 2024-01-01

## 2024-01-01 RX ORDER — BUMETANIDE 2 MG/1
1 TABLET ORAL
Refills: 0 | Status: DISCONTINUED | OUTPATIENT
Start: 2024-01-01 | End: 2024-01-01

## 2024-01-01 RX ORDER — POTASSIUM CHLORIDE 10 MEQ
20 TABLET, EXT RELEASE, PARTICLES/CRYSTALS ORAL EVERY 4 HOURS
Refills: 0 | Status: DISCONTINUED | OUTPATIENT
Start: 2024-01-01 | End: 2024-01-01

## 2024-01-01 RX ORDER — SOTALOL HYDROCHLORIDE 120 MG/1
40 TABLET ORAL
Refills: 0 | Status: DISCONTINUED | OUTPATIENT
Start: 2024-01-01 | End: 2024-01-01

## 2024-01-01 RX ORDER — FUROSEMIDE 40 MG
20 TABLET ORAL DAILY
Refills: 0 | Status: DISCONTINUED | OUTPATIENT
Start: 2024-01-01 | End: 2024-01-01

## 2024-01-01 RX ORDER — SODIUM CHLORIDE 9 MG/ML
1000 INJECTION INTRAMUSCULAR; INTRAVENOUS; SUBCUTANEOUS
Refills: 0 | Status: DISCONTINUED | OUTPATIENT
Start: 2024-01-01 | End: 2024-01-01

## 2024-01-01 RX ORDER — MIDODRINE HYDROCHLORIDE 5 MG/1
1 TABLET ORAL
Refills: 0 | DISCHARGE

## 2024-01-01 RX ORDER — SODIUM BICARBONATE 84 MG/ML
650 INJECTION, SOLUTION INTRAVENOUS THREE TIMES A DAY
Refills: 0 | Status: DISCONTINUED | OUTPATIENT
Start: 2024-01-01 | End: 2024-01-01

## 2024-01-01 RX ORDER — SODIUM BICARBONATE 84 MG/ML
1950 INJECTION, SOLUTION INTRAVENOUS THREE TIMES A DAY
Refills: 0 | Status: DISCONTINUED | OUTPATIENT
Start: 2024-01-01 | End: 2024-01-01

## 2024-01-01 RX ORDER — DEXMEDETOMIDINE HYDROCHLORIDE IN 0.9% SODIUM CHLORIDE 4 UG/ML
0.2 INJECTION INTRAVENOUS
Qty: 200 | Refills: 0 | Status: DISCONTINUED | OUTPATIENT
Start: 2024-01-01 | End: 2024-01-01

## 2024-01-01 RX ORDER — ACETAMINOPHEN 325 MG/1
1000 TABLET ORAL ONCE
Refills: 0 | Status: COMPLETED | OUTPATIENT
Start: 2024-01-01 | End: 2024-01-01

## 2024-01-01 RX ORDER — PANTOPRAZOLE SODIUM 40 MG
40 TABLET, DELAYED RELEASE (ENTERIC COATED) ORAL DAILY
Refills: 0 | Status: DISCONTINUED | OUTPATIENT
Start: 2024-01-01 | End: 2024-01-01

## 2024-01-01 RX ORDER — FENTANYL CITRATE 50 UG/ML
50 INJECTION INTRAMUSCULAR; INTRAVENOUS EVERY 4 HOURS
Refills: 0 | Status: DISCONTINUED | OUTPATIENT
Start: 2024-01-01 | End: 2024-01-01

## 2024-01-01 RX ORDER — OXYCODONE HYDROCHLORIDE 15 MG/1
400 TABLET ORAL EVERY 8 HOURS
Refills: 0 | Status: COMPLETED | OUTPATIENT
Start: 2024-01-01 | End: 2024-01-01

## 2024-01-01 RX ORDER — METOPROLOL TARTRATE 100 MG/1
12.5 TABLET ORAL
Refills: 0 | Status: DISCONTINUED | OUTPATIENT
Start: 2024-01-01 | End: 2024-01-01

## 2024-01-01 RX ORDER — PHENYLEPHRINE HYDROCHLORIDE 10 MG/ML
0.25 INJECTION INTRAVENOUS
Qty: 40 | Refills: 0 | Status: DISCONTINUED | OUTPATIENT
Start: 2024-01-01 | End: 2024-01-01

## 2024-01-01 RX ORDER — METOPROLOL TARTRATE 100 MG/1
5 TABLET ORAL EVERY 6 HOURS
Refills: 0 | Status: DISCONTINUED | OUTPATIENT
Start: 2024-01-01 | End: 2024-01-01

## 2024-01-01 RX ORDER — SOTALOL HYDROCHLORIDE 120 MG/1
40 TABLET ORAL EVERY 12 HOURS
Refills: 0 | Status: DISCONTINUED | OUTPATIENT
Start: 2024-01-01 | End: 2024-01-01

## 2024-01-01 RX ORDER — DIGOXIN 0.12 MG/1
500 TABLET ORAL ONCE
Refills: 0 | Status: COMPLETED | OUTPATIENT
Start: 2024-01-01 | End: 2024-01-01

## 2024-01-01 RX ORDER — PETROLATUM 865 MG/G
1 OINTMENT TOPICAL
Refills: 0 | Status: DISCONTINUED | OUTPATIENT
Start: 2024-01-01 | End: 2024-01-01

## 2024-01-01 RX ORDER — DIGOXIN 0.12 MG/1
62.5 TABLET ORAL DAILY
Refills: 0 | Status: COMPLETED | OUTPATIENT
Start: 2024-01-01 | End: 2024-01-01

## 2024-01-01 RX ORDER — PANTOPRAZOLE SODIUM 40 MG
40 TABLET, DELAYED RELEASE (ENTERIC COATED) ORAL EVERY 12 HOURS
Refills: 0 | Status: ACTIVE | OUTPATIENT
Start: 2024-01-01 | End: 2025-06-24

## 2024-01-01 RX ORDER — FENTANYL CITRATE 50 UG/ML
25 INJECTION INTRAMUSCULAR; INTRAVENOUS ONCE
Refills: 0 | Status: DISCONTINUED | OUTPATIENT
Start: 2024-01-01 | End: 2024-01-01

## 2024-01-01 RX ORDER — ONDANSETRON 2 MG/ML
4 INJECTION, SOLUTION INTRAMUSCULAR; INTRAVENOUS EVERY 8 HOURS
Refills: 0 | Status: DISCONTINUED | OUTPATIENT
Start: 2024-01-01 | End: 2024-01-01

## 2024-01-01 RX ORDER — DEXMEDETOMIDINE HYDROCHLORIDE IN 0.9% SODIUM CHLORIDE 4 UG/ML
3 INJECTION INTRAVENOUS
Qty: 200 | Refills: 0 | Status: DISCONTINUED | OUTPATIENT
Start: 2024-01-01 | End: 2024-01-01

## 2024-01-01 RX ORDER — PHENYLEPHRINE HYDROCHLORIDE 10 MG/ML
1 INJECTION INTRAVENOUS
Qty: 40 | Refills: 0 | Status: DISCONTINUED | OUTPATIENT
Start: 2024-01-01 | End: 2024-01-01

## 2024-01-01 RX ORDER — METOPROLOL TARTRATE 100 MG/1
25 TABLET ORAL THREE TIMES A DAY
Refills: 0 | Status: DISCONTINUED | OUTPATIENT
Start: 2024-01-01 | End: 2024-01-01

## 2024-01-01 RX ORDER — LIDOCAINE/BENZALKONIUM/ALCOHOL
1 SOLUTION, NON-ORAL TOPICAL ONCE
Refills: 0 | Status: COMPLETED | OUTPATIENT
Start: 2024-01-01 | End: 2024-01-01

## 2024-01-01 RX ORDER — MUPIROCIN 2 %
1 OINTMENT (GRAM) TOPICAL
Refills: 0 | Status: COMPLETED | OUTPATIENT
Start: 2024-01-01 | End: 2024-01-01

## 2024-01-01 RX ORDER — HEPARIN SODIUM 1000 [USP'U]/ML
8500 INJECTION INTRAVENOUS; SUBCUTANEOUS EVERY 6 HOURS
Refills: 0 | Status: DISCONTINUED | OUTPATIENT
Start: 2024-01-01 | End: 2024-01-01

## 2024-01-01 RX ORDER — LIDOCAINE HYDROCHLORIDE 20 MG/ML
1 JELLY TOPICAL DAILY
Refills: 0 | Status: DISCONTINUED | OUTPATIENT
Start: 2024-01-01 | End: 2024-01-01

## 2024-01-01 RX ORDER — CEFEPIME 2 G/1
2000 INJECTION, POWDER, FOR SOLUTION INTRAVENOUS ONCE
Refills: 0 | Status: DISCONTINUED | OUTPATIENT
Start: 2024-01-01 | End: 2024-01-01

## 2024-01-01 RX ORDER — POTASSIUM PHOSPHATE 236; 224 MG/ML; MG/ML
30 INJECTION, SOLUTION INTRAVENOUS ONCE
Refills: 0 | Status: COMPLETED | OUTPATIENT
Start: 2024-01-01 | End: 2024-01-01

## 2024-01-01 RX ORDER — ACETAMINOPHEN 325 MG/1
1000 TABLET ORAL ONCE
Refills: 0 | Status: DISCONTINUED | OUTPATIENT
Start: 2024-01-01 | End: 2024-01-01

## 2024-01-01 RX ORDER — PANTOPRAZOLE SODIUM 40 MG
40 TABLET, DELAYED RELEASE (ENTERIC COATED) ORAL
Refills: 0 | Status: DISCONTINUED | OUTPATIENT
Start: 2024-01-01 | End: 2024-01-01

## 2024-01-01 RX ORDER — DROXIDOPA 300 UG/1
100 CAPSULE ORAL THREE TIMES A DAY
Refills: 0 | Status: DISCONTINUED | OUTPATIENT
Start: 2024-01-01 | End: 2024-01-01

## 2024-01-01 RX ORDER — OXYCODONE HYDROCHLORIDE 15 MG/1
1 TABLET ORAL
Qty: 450 | Refills: 0 | Status: DISCONTINUED | OUTPATIENT
Start: 2024-01-01 | End: 2024-01-01

## 2024-01-01 RX ORDER — DEXMEDETOMIDINE HYDROCHLORIDE IN 0.9% SODIUM CHLORIDE 4 UG/ML
0.3 INJECTION INTRAVENOUS
Qty: 200 | Refills: 0 | Status: DISCONTINUED | OUTPATIENT
Start: 2024-01-01 | End: 2024-01-01

## 2024-01-01 RX ORDER — B1/B2/B3/B5/B6/B12/VIT C/FOLIC 500-0.5 MG
1 TABLET ORAL
Qty: 0 | Refills: 0 | DISCHARGE
Start: 2024-01-01

## 2024-01-01 RX ORDER — OXYCODONE HYDROCHLORIDE 15 MG/1
200 TABLET ORAL DAILY
Refills: 0 | Status: DISCONTINUED | OUTPATIENT
Start: 2024-01-01 | End: 2024-01-01

## 2024-01-01 RX ORDER — PROPOFOL 10 MG/ML
20 INJECTION, EMULSION INTRAVENOUS
Qty: 1000 | Refills: 0 | Status: DISCONTINUED | OUTPATIENT
Start: 2024-01-01 | End: 2024-01-01

## 2024-01-01 RX ORDER — MAGNESIUM SULFATE 500 MG/ML
1 SYRINGE (ML) INJECTION ONCE
Refills: 0 | Status: COMPLETED | OUTPATIENT
Start: 2024-01-01 | End: 2024-01-01

## 2024-01-01 RX ORDER — PHENYLEPHRINE HYDROCHLORIDE 10 MG/ML
0.1 INJECTION INTRAVENOUS
Qty: 160 | Refills: 0 | Status: DISCONTINUED | OUTPATIENT
Start: 2024-01-01 | End: 2024-01-01

## 2024-01-01 RX ORDER — MEROPENEM 500 MG/10ML
1000 INJECTION, POWDER, FOR SOLUTION INTRAVENOUS EVERY 8 HOURS
Refills: 0 | Status: COMPLETED | OUTPATIENT
Start: 2024-01-01 | End: 2024-01-01

## 2024-01-01 RX ORDER — SOTALOL HYDROCHLORIDE 120 MG/1
80 TABLET ORAL EVERY 12 HOURS
Refills: 0 | Status: DISCONTINUED | OUTPATIENT
Start: 2024-01-01 | End: 2024-01-01

## 2024-01-01 RX ORDER — SODIUM BICARBONATE 84 MG/ML
750 INJECTION, SOLUTION INTRAVENOUS THREE TIMES A DAY
Refills: 0 | Status: DISCONTINUED | OUTPATIENT
Start: 2024-01-01 | End: 2024-01-01

## 2024-01-01 RX ORDER — COLCHICINE 0.6 MG/1
0.6 TABLET, FILM COATED ORAL DAILY
Refills: 0 | Status: DISCONTINUED | OUTPATIENT
Start: 2024-01-01 | End: 2024-01-01

## 2024-01-01 RX ORDER — DIGOXIN 0.12 MG/1
125 TABLET ORAL EVERY OTHER DAY
Refills: 0 | Status: DISCONTINUED | OUTPATIENT
Start: 2024-01-01 | End: 2024-01-01

## 2024-01-01 RX ORDER — MIDODRINE HYDROCHLORIDE 5 MG/1
30 TABLET ORAL THREE TIMES A DAY
Refills: 0 | Status: DISCONTINUED | OUTPATIENT
Start: 2024-01-01 | End: 2024-01-01

## 2024-01-01 RX ORDER — VASOPRESSIN 20 [USP'U]/ML
0.02 INJECTION INTRAVENOUS
Qty: 40 | Refills: 0 | Status: DISCONTINUED | OUTPATIENT
Start: 2024-01-01 | End: 2024-01-01

## 2024-01-01 RX ORDER — CEFEPIME 2 G/1
INJECTION, POWDER, FOR SOLUTION INTRAVENOUS
Refills: 0 | Status: COMPLETED | OUTPATIENT
Start: 2024-01-01 | End: 2024-01-01

## 2024-01-01 RX ORDER — APIXABAN 2.5 MG/1
5 TABLET, FILM COATED ORAL EVERY 12 HOURS
Refills: 0 | Status: DISCONTINUED | OUTPATIENT
Start: 2024-01-01 | End: 2024-01-01

## 2024-01-01 RX ORDER — CANGRELOR 50 MG/1
4 INJECTION, POWDER, LYOPHILIZED, FOR SOLUTION INTRAVENOUS
Qty: 50 | Refills: 0 | Status: DISCONTINUED | OUTPATIENT
Start: 2024-01-01 | End: 2024-01-01

## 2024-01-01 RX ORDER — GUAIFENESIN 100 MG/5ML
300 LIQUID ORAL EVERY 6 HOURS
Refills: 0 | Status: DISCONTINUED | OUTPATIENT
Start: 2024-01-01 | End: 2024-01-01

## 2024-01-01 RX ORDER — ACETAMINOPHEN 325 MG/1
650 TABLET ORAL ONCE
Refills: 0 | Status: COMPLETED | OUTPATIENT
Start: 2024-01-01 | End: 2024-01-01

## 2024-01-01 RX ORDER — MIDODRINE HYDROCHLORIDE 5 MG/1
15 TABLET ORAL THREE TIMES A DAY
Refills: 0 | Status: DISCONTINUED | OUTPATIENT
Start: 2024-01-01 | End: 2024-01-01

## 2024-01-01 RX ORDER — ACETAMINOPHEN 325 MG/1
500 TABLET ORAL ONCE
Refills: 0 | Status: COMPLETED | OUTPATIENT
Start: 2024-01-01 | End: 2024-01-01

## 2024-01-01 RX ORDER — BUMETANIDE 2 MG/1
2 TABLET ORAL DAILY
Refills: 0 | Status: DISCONTINUED | OUTPATIENT
Start: 2024-01-01 | End: 2024-01-01

## 2024-01-01 RX ORDER — HYDROCORTISONE 10 MG/1
50 TABLET ORAL EVERY 6 HOURS
Refills: 0 | Status: DISCONTINUED | OUTPATIENT
Start: 2024-01-01 | End: 2024-01-01

## 2024-01-01 RX ORDER — LIDOCAINE/BENZALKONIUM/ALCOHOL
1 SOLUTION, NON-ORAL TOPICAL DAILY
Refills: 0 | Status: DISCONTINUED | OUTPATIENT
Start: 2024-01-01 | End: 2024-01-01

## 2024-01-01 RX ORDER — TORSEMIDE 20 MG/1
1 TABLET ORAL
Refills: 0 | DISCHARGE

## 2024-01-01 RX ORDER — METOPROLOL TARTRATE 100 MG/1
5 TABLET ORAL ONCE
Refills: 0 | Status: COMPLETED | OUTPATIENT
Start: 2024-01-01 | End: 2024-01-01

## 2024-01-01 RX ORDER — HYDROMORPHONE HYDROCHLORIDE 2 MG/1
0.3 TABLET ORAL
Refills: 0 | Status: DISCONTINUED | OUTPATIENT
Start: 2024-01-01 | End: 2024-01-01

## 2024-01-01 RX ORDER — GUAIFENESIN 100 MG/5ML
200 LIQUID ORAL EVERY 6 HOURS
Refills: 0 | Status: DISCONTINUED | OUTPATIENT
Start: 2024-01-01 | End: 2024-01-01

## 2024-01-01 RX ORDER — EMPAGLIFLOZIN 25 MG/1
1 TABLET, FILM COATED ORAL
Refills: 0 | DISCHARGE

## 2024-01-01 RX ORDER — ACETAMINOPHEN 500 MG/5ML
1000 LIQUID (ML) ORAL ONCE
Refills: 0 | Status: COMPLETED | OUTPATIENT
Start: 2024-01-01 | End: 2024-01-01

## 2024-01-01 RX ORDER — HYDROCORTISONE 10 MG/1
50 TABLET ORAL DAILY
Refills: 0 | Status: DISCONTINUED | OUTPATIENT
Start: 2024-01-01 | End: 2024-01-01

## 2024-01-01 RX ORDER — MIDODRINE HYDROCHLORIDE 5 MG/1
20 TABLET ORAL THREE TIMES A DAY
Refills: 0 | Status: DISCONTINUED | OUTPATIENT
Start: 2024-01-01 | End: 2024-01-01

## 2024-01-01 RX ORDER — HYDROMORPHONE HYDROCHLORIDE 2 MG/1
0.5 TABLET ORAL
Refills: 0 | Status: DISCONTINUED | OUTPATIENT
Start: 2024-01-01 | End: 2024-01-01

## 2024-01-01 RX ORDER — PIPERACILLIN SODIUM AND TAZOBACTAM SODIUM 3; .375 G/15ML; G/15ML
3.38 INJECTION, POWDER, FOR SOLUTION INTRAVENOUS EVERY 12 HOURS
Refills: 0 | Status: COMPLETED | OUTPATIENT
Start: 2024-01-01 | End: 2024-01-01

## 2024-01-01 RX ORDER — B1/B2/B3/B5/B6/B12/VIT C/FOLIC 500-0.5 MG
1 TABLET ORAL DAILY
Refills: 0 | Status: DISCONTINUED | OUTPATIENT
Start: 2024-01-01 | End: 2024-01-01

## 2024-01-01 RX ORDER — FUROSEMIDE 10 MG/ML
20 INJECTION INTRAMUSCULAR; INTRAVENOUS
Refills: 0 | Status: DISCONTINUED | OUTPATIENT
Start: 2024-01-01 | End: 2024-01-01

## 2024-01-01 RX ORDER — CEFEPIME 2 G/1
2000 INJECTION, POWDER, FOR SOLUTION INTRAVENOUS EVERY 8 HOURS
Refills: 0 | Status: COMPLETED | OUTPATIENT
Start: 2024-01-01 | End: 2024-01-01

## 2024-01-01 RX ORDER — OXYCODONE HYDROCHLORIDE 15 MG/1
TABLET ORAL
Refills: 0 | Status: DISCONTINUED | OUTPATIENT
Start: 2024-01-01 | End: 2024-01-01

## 2024-01-01 RX ORDER — DROXIDOPA 300 UG/1
200 CAPSULE ORAL EVERY 8 HOURS
Refills: 0 | Status: DISCONTINUED | OUTPATIENT
Start: 2024-01-01 | End: 2024-01-01

## 2024-01-01 RX ORDER — DIGOXIN 0.12 MG/1
250 TABLET ORAL ONCE
Refills: 0 | Status: COMPLETED | OUTPATIENT
Start: 2024-01-01 | End: 2024-01-01

## 2024-01-01 RX ORDER — HEPARIN SODIUM 1000 [USP'U]/ML
INJECTION INTRAVENOUS; SUBCUTANEOUS
Qty: 25000 | Refills: 0 | Status: DISCONTINUED | OUTPATIENT
Start: 2024-01-01 | End: 2024-01-01

## 2024-01-01 RX ORDER — HYDROCORTISONE 10 MG/1
50 TABLET ORAL EVERY 12 HOURS
Refills: 0 | Status: DISCONTINUED | OUTPATIENT
Start: 2024-01-01 | End: 2024-01-01

## 2024-01-01 RX ORDER — POTASSIUM CHLORIDE 10 MEQ
20 TABLET, EXT RELEASE, PARTICLES/CRYSTALS ORAL
Refills: 0 | Status: DISCONTINUED | OUTPATIENT
Start: 2024-01-01 | End: 2024-01-01

## 2024-01-01 RX ORDER — LORAZEPAM 4 MG/ML
0.25 INJECTION INTRAMUSCULAR; INTRAVENOUS
Refills: 0 | Status: DISCONTINUED | OUTPATIENT
Start: 2024-01-01 | End: 2024-01-01

## 2024-01-01 RX ORDER — COLCHICINE 0.6 MG/1
1 TABLET, FILM COATED ORAL
Qty: 14 | Refills: 0
Start: 2024-01-01 | End: 2024-01-01

## 2024-01-01 RX ORDER — VANCOMYCIN/0.9 % SOD CHLORIDE 1.75G/25
125 PLASTIC BAG, INJECTION (ML) INTRAVENOUS EVERY 6 HOURS
Refills: 0 | Status: DISCONTINUED | OUTPATIENT
Start: 2024-01-01 | End: 2024-01-01

## 2024-01-01 RX ORDER — SODIUM CHLORIDE 9 MG/ML
10 INJECTION INTRAMUSCULAR; INTRAVENOUS; SUBCUTANEOUS
Refills: 0 | Status: DISCONTINUED | OUTPATIENT
Start: 2024-01-01 | End: 2024-01-01

## 2024-01-01 RX ORDER — SOTALOL HYDROCHLORIDE 120 MG/1
0.5 TABLET ORAL
Refills: 0 | DISCHARGE

## 2024-01-01 RX ORDER — ALLOPURINOL 300 MG
100 TABLET ORAL
Refills: 0 | Status: DISCONTINUED | OUTPATIENT
Start: 2024-01-01 | End: 2024-01-01

## 2024-01-01 RX ORDER — ACETAMINOPHEN 325 MG/1
650 TABLET ORAL EVERY 6 HOURS
Refills: 0 | Status: DISCONTINUED | OUTPATIENT
Start: 2024-01-01 | End: 2024-01-01

## 2024-01-01 RX ORDER — FENTANYL CITRATE 50 UG/ML
100 INJECTION INTRAMUSCULAR; INTRAVENOUS ONCE
Refills: 0 | Status: DISCONTINUED | OUTPATIENT
Start: 2024-01-01 | End: 2024-01-01

## 2024-01-01 RX ORDER — VANCOMYCIN/0.9 % SOD CHLORIDE 1.75G/25
125 PLASTIC BAG, INJECTION (ML) INTRAVENOUS EVERY 6 HOURS
Refills: 0 | Status: COMPLETED | OUTPATIENT
Start: 2024-01-01 | End: 2024-01-01

## 2024-01-01 RX ORDER — FOLIC ACID 1 MG/1
1 TABLET ORAL DAILY
Refills: 0 | Status: DISCONTINUED | OUTPATIENT
Start: 2024-01-01 | End: 2024-01-01

## 2024-01-01 RX ORDER — GUAIFENESIN 100 MG/5ML
400 LIQUID ORAL EVERY 6 HOURS
Refills: 0 | Status: DISCONTINUED | OUTPATIENT
Start: 2024-01-01 | End: 2024-01-01

## 2024-01-01 RX ORDER — ALBUMIN (HUMAN) 5 G/20ML
50 SOLUTION INTRAVENOUS EVERY 8 HOURS
Refills: 0 | Status: DISCONTINUED | OUTPATIENT
Start: 2024-01-01 | End: 2024-01-01

## 2024-01-01 RX ORDER — DIGOXIN 0.12 MG/1
250 TABLET ORAL EVERY 6 HOURS
Refills: 0 | Status: DISCONTINUED | OUTPATIENT
Start: 2024-01-01 | End: 2024-01-01

## 2024-01-01 RX ORDER — COLCHICINE 0.6 MG/1
0.6 TABLET, FILM COATED ORAL ONCE
Refills: 0 | Status: COMPLETED | OUTPATIENT
Start: 2024-01-01 | End: 2024-01-01

## 2024-01-01 RX ORDER — EZETIMIBE 10 MG/1
10 TABLET ORAL AT BEDTIME
Refills: 0 | Status: DISCONTINUED | OUTPATIENT
Start: 2024-01-01 | End: 2024-01-01

## 2024-01-01 RX ORDER — FENTANYL CITRATE 50 UG/ML
50 INJECTION INTRAMUSCULAR; INTRAVENOUS ONCE
Refills: 0 | Status: DISCONTINUED | OUTPATIENT
Start: 2024-01-01 | End: 2024-01-01

## 2024-01-01 RX ORDER — DEXMEDETOMIDINE HYDROCHLORIDE IN 0.9% SODIUM CHLORIDE 4 UG/ML
0.5 INJECTION INTRAVENOUS
Qty: 200 | Refills: 0 | Status: DISCONTINUED | OUTPATIENT
Start: 2024-01-01 | End: 2024-01-01

## 2024-01-01 RX ORDER — APIXABAN 5 MG/1
5 TABLET, FILM COATED ORAL EVERY 12 HOURS
Refills: 0 | Status: DISCONTINUED | OUTPATIENT
Start: 2024-01-01 | End: 2024-01-01

## 2024-01-01 RX ORDER — POTASSIUM CHLORIDE 10 MEQ
20 TABLET, EXT RELEASE, PARTICLES/CRYSTALS ORAL ONCE
Refills: 0 | Status: DISCONTINUED | OUTPATIENT
Start: 2024-01-01 | End: 2024-01-01

## 2024-01-01 RX ORDER — TORSEMIDE 20 MG/1
20 TABLET ORAL
Refills: 0 | Status: DISCONTINUED | OUTPATIENT
Start: 2024-01-01 | End: 2024-01-01

## 2024-01-01 RX ORDER — MIRTAZAPINE 30 MG
7.5 TABLET ORAL AT BEDTIME
Refills: 0 | Status: DISCONTINUED | OUTPATIENT
Start: 2024-01-01 | End: 2024-01-01

## 2024-01-01 RX ORDER — DROXIDOPA 300 UG/1
1 CAPSULE ORAL
Qty: 90 | Refills: 0
Start: 2024-01-01 | End: 2024-01-01

## 2024-01-01 RX ORDER — SODIUM BICARBONATE 84 MG/ML
0.15 INJECTION, SOLUTION INTRAVENOUS
Qty: 150 | Refills: 0 | Status: DISCONTINUED | OUTPATIENT
Start: 2024-01-01 | End: 2024-01-01

## 2024-01-01 RX ORDER — ATORVASTATIN CALCIUM 80 MG/1
80 TABLET, FILM COATED ORAL AT BEDTIME
Refills: 0 | Status: DISCONTINUED | OUTPATIENT
Start: 2024-01-01 | End: 2024-01-01

## 2024-01-01 RX ORDER — DEXTROSE 15 G/33 G
15 GEL IN PACKET (GRAM) ORAL ONCE
Refills: 0 | Status: DISCONTINUED | OUTPATIENT
Start: 2024-01-01 | End: 2024-01-01

## 2024-01-01 RX ORDER — AMOXICILLIN AND CLAVULANATE POTASSIUM 500; 125 MG/1; MG/1
1 TABLET, FILM COATED ORAL
Qty: 0 | Refills: 0 | DISCHARGE

## 2024-01-01 RX ORDER — PROPOFOL 10 MG/ML
10 INJECTION, EMULSION INTRAVENOUS
Qty: 1000 | Refills: 0 | Status: DISCONTINUED | OUTPATIENT
Start: 2024-01-01 | End: 2024-01-01

## 2024-01-01 RX ORDER — SODIUM CHLORIDE 9 MG/ML
4 INJECTION INTRAMUSCULAR; INTRAVENOUS; SUBCUTANEOUS EVERY 12 HOURS
Refills: 0 | Status: DISCONTINUED | OUTPATIENT
Start: 2024-01-01 | End: 2024-01-01

## 2024-01-01 RX ORDER — VANCOMYCIN/0.9 % SOD CHLORIDE 1.75G/25
1750 PLASTIC BAG, INJECTION (ML) INTRAVENOUS EVERY 12 HOURS
Refills: 0 | Status: DISCONTINUED | OUTPATIENT
Start: 2024-01-01 | End: 2024-01-01

## 2024-01-01 RX ORDER — PHYTONADIONE (VIT K1) 1 MG/0.5ML
10 AMPUL (ML) INJECTION ONCE
Refills: 0 | Status: COMPLETED | OUTPATIENT
Start: 2024-01-01 | End: 2024-01-01

## 2024-01-01 RX ORDER — PIPERACILLIN SODIUM AND TAZOBACTAM SODIUM 3; .375 G/15ML; G/15ML
3.38 INJECTION, POWDER, FOR SOLUTION INTRAVENOUS EVERY 8 HOURS
Refills: 0 | Status: DISCONTINUED | OUTPATIENT
Start: 2024-01-01 | End: 2024-01-01

## 2024-01-01 RX ORDER — MUPIROCIN 2 %
1 OINTMENT (GRAM) TOPICAL
Refills: 0 | Status: DISCONTINUED | OUTPATIENT
Start: 2024-01-01 | End: 2024-01-01

## 2024-01-01 RX ORDER — METOPROLOL TARTRATE 100 MG/1
2.5 TABLET ORAL ONCE
Refills: 0 | Status: COMPLETED | OUTPATIENT
Start: 2024-01-01 | End: 2024-01-01

## 2024-01-01 RX ORDER — PHENYLEPHRINE HYDROCHLORIDE 10 MG/ML
0.8 INJECTION INTRAVENOUS
Qty: 40 | Refills: 0 | Status: DISCONTINUED | OUTPATIENT
Start: 2024-01-01 | End: 2024-01-01

## 2024-01-01 RX ORDER — OXYMETAZOLINE HYDROCHLORIDE 0.05 G/100ML
1 SPRAY, METERED NASAL ONCE
Refills: 0 | Status: COMPLETED | OUTPATIENT
Start: 2024-01-01 | End: 2024-01-01

## 2024-01-01 RX ORDER — HYDROCORTISONE 10 MG/1
25 TABLET ORAL DAILY
Refills: 0 | Status: COMPLETED | OUTPATIENT
Start: 2024-01-01 | End: 2024-01-01

## 2024-01-01 RX ORDER — GLYCOPYRROLATE 0.2 MG/ML
0.4 INJECTION INTRAMUSCULAR; INTRAVENOUS EVERY 6 HOURS
Refills: 0 | Status: DISCONTINUED | OUTPATIENT
Start: 2024-01-01 | End: 2024-01-01

## 2024-01-01 RX ORDER — IPRATROPIUM BROMIDE AND ALBUTEROL SULFATE .5; 3 MG/3ML; MG/3ML
3 SOLUTION RESPIRATORY (INHALATION) EVERY 6 HOURS
Refills: 0 | Status: DISCONTINUED | OUTPATIENT
Start: 2024-01-01 | End: 2024-01-01

## 2024-01-01 RX ORDER — SODIUM PHOSPHATE, DIBASIC, ANHYDROUS, POTASSIUM PHOSPHATE, MONOBASIC, AND SODIUM PHOSPHATE, MONOBASIC, MONOHYDRATE 852; 155; 130 MG/1; MG/1; MG/1
2 TABLET, COATED ORAL ONCE
Refills: 0 | Status: COMPLETED | OUTPATIENT
Start: 2024-01-01 | End: 2024-01-01

## 2024-01-01 RX ORDER — VANCOMYCIN/0.9 % SOD CHLORIDE 1.75G/25
125 PLASTIC BAG, INJECTION (ML) INTRAVENOUS EVERY 12 HOURS
Refills: 0 | Status: COMPLETED | OUTPATIENT
Start: 2024-01-01 | End: 2024-01-01

## 2024-01-01 RX ORDER — CALCIUM GLUCONATE 61(648) MG
1 TABLET ORAL ONCE
Refills: 0 | Status: COMPLETED | OUTPATIENT
Start: 2024-01-01 | End: 2024-01-01

## 2024-01-01 RX ORDER — MEROPENEM 500 MG/10ML
INJECTION, POWDER, FOR SOLUTION INTRAVENOUS
Refills: 0 | Status: COMPLETED | OUTPATIENT
Start: 2024-01-01 | End: 2024-01-01

## 2024-01-01 RX ORDER — MEROPENEM 500 MG/10ML
1000 INJECTION, POWDER, FOR SOLUTION INTRAVENOUS ONCE
Refills: 0 | Status: COMPLETED | OUTPATIENT
Start: 2024-01-01 | End: 2024-01-01

## 2024-01-01 RX ORDER — APIXABAN 2.5 MG/1
1 TABLET, FILM COATED ORAL
Refills: 0 | DISCHARGE

## 2024-01-01 RX ORDER — ASPIRIN 325 MG
81 TABLET ORAL DAILY
Refills: 0 | Status: DISCONTINUED | OUTPATIENT
Start: 2024-01-01 | End: 2024-01-01

## 2024-01-01 RX ORDER — GLUCAGON INJECTION, SOLUTION 1 MG/.2ML
1 INJECTION, SOLUTION SUBCUTANEOUS ONCE
Refills: 0 | Status: DISCONTINUED | OUTPATIENT
Start: 2024-01-01 | End: 2024-01-01

## 2024-01-01 RX ORDER — POTASSIUM CHLORIDE 10 MEQ
20 TABLET, EXT RELEASE, PARTICLES/CRYSTALS ORAL EVERY 4 HOURS
Refills: 0 | Status: COMPLETED | OUTPATIENT
Start: 2024-01-01 | End: 2024-01-01

## 2024-01-01 RX ORDER — ASCORBIC ACID/ASCORBATE SODIUM 500 MG
500 TABLET,CHEWABLE ORAL DAILY
Refills: 0 | Status: DISCONTINUED | OUTPATIENT
Start: 2024-01-01 | End: 2024-01-01

## 2024-01-01 RX ORDER — ASPIRIN 81 MG
81 TABLET, DELAYED RELEASE (ENTERIC COATED) ORAL DAILY
Refills: 0 | Status: DISCONTINUED | OUTPATIENT
Start: 2024-01-01 | End: 2024-01-01

## 2024-01-01 RX ORDER — ALBUMIN (HUMAN) 5 G/20ML
50 SOLUTION INTRAVENOUS ONCE
Refills: 0 | Status: COMPLETED | OUTPATIENT
Start: 2024-01-01 | End: 2024-01-01

## 2024-01-01 RX ORDER — LORAZEPAM 4 MG/ML
2 INJECTION INTRAMUSCULAR; INTRAVENOUS EVERY 4 HOURS
Refills: 0 | Status: DISCONTINUED | OUTPATIENT
Start: 2024-01-01 | End: 2024-01-01

## 2024-01-01 RX ORDER — MAGNESIUM SULFATE 500 MG/ML
2 SYRINGE (ML) INJECTION ONCE
Refills: 0 | Status: COMPLETED | OUTPATIENT
Start: 2024-01-01 | End: 2024-01-01

## 2024-01-01 RX ORDER — CLOPIDOGREL BISULFATE 75 MG/1
75 TABLET, FILM COATED ORAL DAILY
Refills: 0 | Status: DISCONTINUED | OUTPATIENT
Start: 2024-01-01 | End: 2024-01-01

## 2024-01-01 RX ORDER — FUROSEMIDE 40 MG
40 TABLET ORAL ONCE
Refills: 0 | Status: DISCONTINUED | OUTPATIENT
Start: 2024-01-01 | End: 2024-01-01

## 2024-01-01 RX ADMIN — Medication 250 MILLILITER(S): at 18:53

## 2024-01-01 RX ADMIN — Medication 40 MILLIEQUIVALENT(S): at 02:44

## 2024-01-01 RX ADMIN — Medication 40 MILLIGRAM(S): at 18:01

## 2024-01-01 RX ADMIN — ACETAMINOPHEN 1000 MILLIGRAM(S): 325 TABLET ORAL at 22:30

## 2024-01-01 RX ADMIN — Medication 125 MILLIGRAM(S): at 23:34

## 2024-01-01 RX ADMIN — Medication 40 MILLIGRAM(S): at 15:35

## 2024-01-01 RX ADMIN — Medication 100 MILLIGRAM(S): at 05:05

## 2024-01-01 RX ADMIN — HYDROCORTISONE 50 MILLIGRAM(S): 10 TABLET ORAL at 06:08

## 2024-01-01 RX ADMIN — PHENYLEPHRINE HYDROCHLORIDE 29.6 MICROGRAM(S)/KG/MIN: 10 INJECTION INTRAVENOUS at 20:14

## 2024-01-01 RX ADMIN — MIDODRINE HYDROCHLORIDE 15 MILLIGRAM(S): 5 TABLET ORAL at 05:19

## 2024-01-01 RX ADMIN — DROXIDOPA 200 MILLIGRAM(S): 300 CAPSULE ORAL at 13:37

## 2024-01-01 RX ADMIN — Medication 0.6 MILLIGRAM(S): at 11:08

## 2024-01-01 RX ADMIN — OCTREOTIDE ACETATE 250 MICROGRAM(S): 100 INJECTION, SOLUTION INTRAVENOUS; SUBCUTANEOUS at 22:51

## 2024-01-01 RX ADMIN — Medication 100 MILLIGRAM(S): at 11:33

## 2024-01-01 RX ADMIN — OCTREOTIDE ACETATE 250 MICROGRAM(S): 100 INJECTION, SOLUTION INTRAVENOUS; SUBCUTANEOUS at 13:34

## 2024-01-01 RX ADMIN — SODIUM CHLORIDE 4 MILLILITER(S): 9 INJECTION INTRAMUSCULAR; INTRAVENOUS; SUBCUTANEOUS at 05:10

## 2024-01-01 RX ADMIN — METOPROLOL TARTRATE 25 MILLIGRAM(S): 100 TABLET ORAL at 06:34

## 2024-01-01 RX ADMIN — Medication 100 MILLIGRAM(S): at 17:53

## 2024-01-01 RX ADMIN — Medication 10 MILLIGRAM(S): at 11:42

## 2024-01-01 RX ADMIN — Medication 100 MILLIGRAM(S): at 11:56

## 2024-01-01 RX ADMIN — Medication 125 MILLIGRAM(S): at 03:36

## 2024-01-01 RX ADMIN — Medication 100 MILLIGRAM(S): at 18:05

## 2024-01-01 RX ADMIN — LORAZEPAM 1 MILLIGRAM(S): 4 INJECTION INTRAMUSCULAR; INTRAVENOUS at 13:05

## 2024-01-01 RX ADMIN — PHENYLEPHRINE HYDROCHLORIDE 9.25 MICROGRAM(S)/KG/MIN: 10 INJECTION INTRAVENOUS at 23:30

## 2024-01-01 RX ADMIN — Medication 100 MILLIEQUIVALENT(S): at 02:00

## 2024-01-01 RX ADMIN — Medication 81 MILLIGRAM(S): at 12:25

## 2024-01-01 RX ADMIN — Medication 100 MILLIGRAM(S): at 12:33

## 2024-01-01 RX ADMIN — SODIUM CHLORIDE 4 MILLILITER(S): 9 INJECTION INTRAMUSCULAR; INTRAVENOUS; SUBCUTANEOUS at 05:34

## 2024-01-01 RX ADMIN — Medication 100 MILLIEQUIVALENT(S): at 00:59

## 2024-01-01 RX ADMIN — Medication 100 MILLIGRAM(S): at 17:09

## 2024-01-01 RX ADMIN — Medication 25 GRAM(S): at 08:51

## 2024-01-01 RX ADMIN — ACETAMINOPHEN 400 MILLIGRAM(S): 325 TABLET ORAL at 22:15

## 2024-01-01 RX ADMIN — HYDROCORTISONE 25 MILLIGRAM(S): 10 TABLET ORAL at 05:48

## 2024-01-01 RX ADMIN — SOTALOL HYDROCHLORIDE 40 MILLIGRAM(S): 120 TABLET ORAL at 12:50

## 2024-01-01 RX ADMIN — Medication 1: at 00:15

## 2024-01-01 RX ADMIN — Medication 20 MILLIEQUIVALENT(S): at 18:12

## 2024-01-01 RX ADMIN — MIDODRINE HYDROCHLORIDE 10 MILLIGRAM(S): 5 TABLET ORAL at 22:06

## 2024-01-01 RX ADMIN — Medication 10 MG/HR: at 05:39

## 2024-01-01 RX ADMIN — HEPARIN SODIUM 1800 UNIT(S)/HR: 1000 INJECTION INTRAVENOUS; SUBCUTANEOUS at 19:18

## 2024-01-01 RX ADMIN — CHLORHEXIDINE GLUCONATE 1 APPLICATION(S): 40 SOLUTION TOPICAL at 04:00

## 2024-01-01 RX ADMIN — APIXABAN 5 MILLIGRAM(S): 2.5 TABLET, FILM COATED ORAL at 16:04

## 2024-01-01 RX ADMIN — PETROLATUM 1 APPLICATION(S): 865 OINTMENT TOPICAL at 18:42

## 2024-01-01 RX ADMIN — CHLORHEXIDINE GLUCONATE 1 APPLICATION(S): 40 SOLUTION TOPICAL at 06:10

## 2024-01-01 RX ADMIN — Medication 25 GRAM(S): at 07:10

## 2024-01-01 RX ADMIN — MIDODRINE HYDROCHLORIDE 15 MILLIGRAM(S): 5 TABLET ORAL at 17:55

## 2024-01-01 RX ADMIN — Medication 100 MILLIGRAM(S): at 12:10

## 2024-01-01 RX ADMIN — SODIUM BICARBONATE 1950 MILLIGRAM(S): 84 INJECTION, SOLUTION INTRAVENOUS at 05:00

## 2024-01-01 RX ADMIN — COLCHICINE 0.6 MILLIGRAM(S): 0.6 TABLET, FILM COATED ORAL at 11:49

## 2024-01-01 RX ADMIN — ATORVASTATIN CALCIUM 80 MILLIGRAM(S): 80 TABLET, FILM COATED ORAL at 21:19

## 2024-01-01 RX ADMIN — CEFEPIME 100 MILLIGRAM(S): 2 INJECTION, POWDER, FOR SOLUTION INTRAVENOUS at 05:11

## 2024-01-01 RX ADMIN — Medication 100 MILLIGRAM(S): at 13:05

## 2024-01-01 RX ADMIN — VASOPRESSIN 6 UNIT(S)/MIN: 20 INJECTION INTRAVENOUS at 12:10

## 2024-01-01 RX ADMIN — Medication 2 MILLIGRAM(S): at 21:16

## 2024-01-01 RX ADMIN — Medication 40 MILLIEQUIVALENT(S): at 03:36

## 2024-01-01 RX ADMIN — AMOXICILLIN AND CLAVULANATE POTASSIUM 1 TABLET(S): 500; 125 TABLET, FILM COATED ORAL at 17:53

## 2024-01-01 RX ADMIN — OCTREOTIDE ACETATE 250 MICROGRAM(S): 100 INJECTION, SOLUTION INTRAVENOUS; SUBCUTANEOUS at 05:48

## 2024-01-01 RX ADMIN — Medication 50 MILLIEQUIVALENT(S): at 18:38

## 2024-01-01 RX ADMIN — MIDODRINE HYDROCHLORIDE 15 MILLIGRAM(S): 5 TABLET ORAL at 17:11

## 2024-01-01 RX ADMIN — Medication 25 GRAM(S): at 13:41

## 2024-01-01 RX ADMIN — Medication 75 MILLIGRAM(S): at 12:06

## 2024-01-01 RX ADMIN — Medication 10 MG/HR: at 21:55

## 2024-01-01 RX ADMIN — Medication 1: at 05:13

## 2024-01-01 RX ADMIN — FENTANYL CITRATE 100 MICROGRAM(S): 50 INJECTION INTRAMUSCULAR; INTRAVENOUS at 12:28

## 2024-01-01 RX ADMIN — POTASSIUM PHOSPHATE 83.33 MILLIMOLE(S): 236; 224 INJECTION, SOLUTION INTRAVENOUS at 10:21

## 2024-01-01 RX ADMIN — Medication 75 MILLIGRAM(S): at 06:18

## 2024-01-01 RX ADMIN — APIXABAN 5 MILLIGRAM(S): 5 TABLET, FILM COATED ORAL at 05:56

## 2024-01-01 RX ADMIN — Medication 10 MG/HR: at 09:54

## 2024-01-01 RX ADMIN — DEXMEDETOMIDINE HYDROCHLORIDE IN 0.9% SODIUM CHLORIDE 15.7 MICROGRAM(S)/KG/HR: 4 INJECTION INTRAVENOUS at 12:45

## 2024-01-01 RX ADMIN — MIDODRINE HYDROCHLORIDE 10 MILLIGRAM(S): 5 TABLET ORAL at 06:18

## 2024-01-01 RX ADMIN — METOPROLOL TARTRATE 25 MILLIGRAM(S): 100 TABLET ORAL at 05:36

## 2024-01-01 RX ADMIN — OCTREOTIDE ACETATE 250 MICROGRAM(S): 100 INJECTION, SOLUTION INTRAVENOUS; SUBCUTANEOUS at 05:10

## 2024-01-01 RX ADMIN — MEROPENEM 100 MILLIGRAM(S): 500 INJECTION, POWDER, FOR SOLUTION INTRAVENOUS at 14:31

## 2024-01-01 RX ADMIN — APIXABAN 5 MILLIGRAM(S): 2.5 TABLET, FILM COATED ORAL at 05:08

## 2024-01-01 RX ADMIN — Medication 75 MILLILITER(S): at 19:16

## 2024-01-01 RX ADMIN — HYDROCORTISONE 50 MILLIGRAM(S): 10 TABLET ORAL at 23:13

## 2024-01-01 RX ADMIN — Medication 40 MILLIGRAM(S): at 05:02

## 2024-01-01 RX ADMIN — Medication 1: at 17:30

## 2024-01-01 RX ADMIN — CHLORHEXIDINE GLUCONATE 1 APPLICATION(S): 40 SOLUTION TOPICAL at 05:34

## 2024-01-01 RX ADMIN — MIDODRINE HYDROCHLORIDE 10 MILLIGRAM(S): 5 TABLET ORAL at 21:29

## 2024-01-01 RX ADMIN — DROXIDOPA 100 MILLIGRAM(S): 300 CAPSULE ORAL at 13:03

## 2024-01-01 RX ADMIN — Medication 100 MILLIGRAM(S): at 11:25

## 2024-01-01 RX ADMIN — Medication 10 MG/HR: at 19:23

## 2024-01-01 RX ADMIN — Medication 1 MILLIGRAM(S): at 12:55

## 2024-01-01 RX ADMIN — PROPOFOL 5.92 MICROGRAM(S)/KG/MIN: 10 INJECTION, EMULSION INTRAVENOUS at 09:54

## 2024-01-01 RX ADMIN — CHLORHEXIDINE GLUCONATE 1 APPLICATION(S): 40 SOLUTION TOPICAL at 06:12

## 2024-01-01 RX ADMIN — Medication 100 MILLIGRAM(S): at 11:09

## 2024-01-01 RX ADMIN — Medication 75 MILLIGRAM(S): at 11:48

## 2024-01-01 RX ADMIN — FENTANYL CITRATE 50 MICROGRAM(S): 50 INJECTION INTRAMUSCULAR; INTRAVENOUS at 17:55

## 2024-01-01 RX ADMIN — MIDODRINE HYDROCHLORIDE 15 MILLIGRAM(S): 5 TABLET ORAL at 05:51

## 2024-01-01 RX ADMIN — Medication 81 MILLIGRAM(S): at 23:33

## 2024-01-01 RX ADMIN — METOPROLOL TARTRATE 12.5 MILLIGRAM(S): 100 TABLET ORAL at 05:34

## 2024-01-01 RX ADMIN — CHLORHEXIDINE GLUCONATE 15 MILLILITER(S): 40 SOLUTION TOPICAL at 17:07

## 2024-01-01 RX ADMIN — Medication 1 APPLICATION(S): at 17:20

## 2024-01-01 RX ADMIN — PHENYLEPHRINE HYDROCHLORIDE 9.25 MICROGRAM(S)/KG/MIN: 10 INJECTION INTRAVENOUS at 20:51

## 2024-01-01 RX ADMIN — Medication 2 MILLIGRAM(S): at 21:22

## 2024-01-01 RX ADMIN — PETROLATUM 1 APPLICATION(S): 865 OINTMENT TOPICAL at 17:05

## 2024-01-01 RX ADMIN — PHENYLEPHRINE HYDROCHLORIDE 1.85 MICROGRAM(S)/KG/MIN: 10 INJECTION INTRAVENOUS at 05:39

## 2024-01-01 RX ADMIN — Medication 500 MILLILITER(S): at 21:56

## 2024-01-01 RX ADMIN — CEFEPIME 100 MILLIGRAM(S): 2 INJECTION, POWDER, FOR SOLUTION INTRAVENOUS at 14:55

## 2024-01-01 RX ADMIN — Medication 40 MILLIGRAM(S): at 05:16

## 2024-01-01 RX ADMIN — Medication 1 APPLICATION(S): at 11:22

## 2024-01-01 RX ADMIN — Medication 80 MILLIGRAM(S): at 22:01

## 2024-01-01 RX ADMIN — MIDAZOLAM HYDROCHLORIDE 4 MILLIGRAM(S): 5 INJECTION, SOLUTION INTRAMUSCULAR; INTRAVENOUS at 12:28

## 2024-01-01 RX ADMIN — Medication 1000 MILLIGRAM(S): at 12:52

## 2024-01-01 RX ADMIN — Medication 1 MILLIGRAM(S): at 13:04

## 2024-01-01 RX ADMIN — ACETAMINOPHEN 650 MILLIGRAM(S): 325 TABLET ORAL at 21:00

## 2024-01-01 RX ADMIN — HEPARIN SODIUM 1800 UNIT(S)/HR: 1000 INJECTION INTRAVENOUS; SUBCUTANEOUS at 19:35

## 2024-01-01 RX ADMIN — Medication 80 MILLIGRAM(S): at 23:05

## 2024-01-01 RX ADMIN — PETROLATUM 1 APPLICATION(S): 865 OINTMENT TOPICAL at 18:02

## 2024-01-01 RX ADMIN — Medication 100 MILLIEQUIVALENT(S): at 05:37

## 2024-01-01 RX ADMIN — METOPROLOL TARTRATE 12.5 MILLIGRAM(S): 100 TABLET ORAL at 06:18

## 2024-01-01 RX ADMIN — Medication 100 MILLIEQUIVALENT(S): at 05:14

## 2024-01-01 RX ADMIN — Medication 50 MILLIEQUIVALENT(S): at 13:26

## 2024-01-01 RX ADMIN — Medication 1 MILLIGRAM(S): at 11:07

## 2024-01-01 RX ADMIN — Medication 1000 MILLILITER(S): at 17:44

## 2024-01-01 RX ADMIN — Medication 40 MILLIGRAM(S): at 22:59

## 2024-01-01 RX ADMIN — SODIUM CHLORIDE 4 MILLILITER(S): 9 INJECTION INTRAMUSCULAR; INTRAVENOUS; SUBCUTANEOUS at 07:35

## 2024-01-01 RX ADMIN — MIDODRINE HYDROCHLORIDE 30 MILLIGRAM(S): 5 TABLET ORAL at 06:18

## 2024-01-01 RX ADMIN — SODIUM BICARBONATE 650 MILLIGRAM(S): 84 INJECTION, SOLUTION INTRAVENOUS at 06:11

## 2024-01-01 RX ADMIN — FENTANYL CITRATE 100 MICROGRAM(S): 50 INJECTION INTRAMUSCULAR; INTRAVENOUS at 12:33

## 2024-01-01 RX ADMIN — Medication 100 MILLIGRAM(S): at 12:18

## 2024-01-01 RX ADMIN — Medication 2 MILLIGRAM(S): at 22:00

## 2024-01-01 RX ADMIN — PETROLATUM 1 APPLICATION(S): 865 OINTMENT TOPICAL at 05:15

## 2024-01-01 RX ADMIN — PETROLATUM 1 APPLICATION(S): 865 OINTMENT TOPICAL at 07:38

## 2024-01-01 RX ADMIN — MIDODRINE HYDROCHLORIDE 30 MILLIGRAM(S): 5 TABLET ORAL at 05:58

## 2024-01-01 RX ADMIN — VASOPRESSIN 6 UNIT(S)/MIN: 20 INJECTION INTRAVENOUS at 09:50

## 2024-01-01 RX ADMIN — CHLORHEXIDINE GLUCONATE 1 APPLICATION(S): 40 SOLUTION TOPICAL at 06:32

## 2024-01-01 RX ADMIN — BUMETANIDE 2 MILLIGRAM(S): 2 TABLET ORAL at 15:00

## 2024-01-01 RX ADMIN — SODIUM CHLORIDE 4 MILLILITER(S): 9 INJECTION INTRAMUSCULAR; INTRAVENOUS; SUBCUTANEOUS at 06:34

## 2024-01-01 RX ADMIN — Medication 50 MILLIEQUIVALENT(S): at 15:10

## 2024-01-01 RX ADMIN — Medication 2: at 12:00

## 2024-01-01 RX ADMIN — MIDODRINE HYDROCHLORIDE 20 MILLIGRAM(S): 5 TABLET ORAL at 17:04

## 2024-01-01 RX ADMIN — OCTREOTIDE ACETATE 250 MICROGRAM(S): 100 INJECTION, SOLUTION INTRAVENOUS; SUBCUTANEOUS at 05:08

## 2024-01-01 RX ADMIN — Medication 1 MILLIGRAM(S): at 11:14

## 2024-01-01 RX ADMIN — MIDODRINE HYDROCHLORIDE 30 MILLIGRAM(S): 5 TABLET ORAL at 13:32

## 2024-01-01 RX ADMIN — Medication 100 MILLIGRAM(S): at 11:00

## 2024-01-01 RX ADMIN — Medication 1: at 23:52

## 2024-01-01 RX ADMIN — Medication 1 MILLIGRAM(S): at 12:31

## 2024-01-01 RX ADMIN — Medication 1 MILLIGRAM(S): at 11:54

## 2024-01-01 RX ADMIN — CHLORHEXIDINE GLUCONATE 1 APPLICATION(S): 40 SOLUTION TOPICAL at 06:11

## 2024-01-01 RX ADMIN — PIPERACILLIN SODIUM AND TAZOBACTAM SODIUM 200 GRAM(S): 3; .375 INJECTION, POWDER, FOR SOLUTION INTRAVENOUS at 00:01

## 2024-01-01 RX ADMIN — CHLORHEXIDINE GLUCONATE 1 APPLICATION(S): 40 SOLUTION TOPICAL at 06:09

## 2024-01-01 RX ADMIN — POTASSIUM PHOSPHATE 83.33 MILLIMOLE(S): 236; 224 INJECTION, SOLUTION INTRAVENOUS at 15:39

## 2024-01-01 RX ADMIN — HYDROCORTISONE 50 MILLIGRAM(S): 10 TABLET ORAL at 05:37

## 2024-01-01 RX ADMIN — SODIUM CHLORIDE 4 MILLILITER(S): 9 INJECTION INTRAMUSCULAR; INTRAVENOUS; SUBCUTANEOUS at 05:09

## 2024-01-01 RX ADMIN — Medication 200 GRAM(S): at 01:34

## 2024-01-01 RX ADMIN — MIDODRINE HYDROCHLORIDE 30 MILLIGRAM(S): 5 TABLET ORAL at 06:07

## 2024-01-01 RX ADMIN — Medication 125 MILLIGRAM(S): at 06:31

## 2024-01-01 RX ADMIN — PROPOFOL 11.8 MICROGRAM(S)/KG/MIN: 10 INJECTION, EMULSION INTRAVENOUS at 05:39

## 2024-01-01 RX ADMIN — PIPERACILLIN SODIUM AND TAZOBACTAM SODIUM 25 GRAM(S): 3; .375 INJECTION, POWDER, FOR SOLUTION INTRAVENOUS at 11:31

## 2024-01-01 RX ADMIN — Medication 100 MILLIGRAM(S): at 11:28

## 2024-01-01 RX ADMIN — VASOPRESSIN 6 UNIT(S)/MIN: 20 INJECTION INTRAVENOUS at 20:19

## 2024-01-01 RX ADMIN — MIDODRINE HYDROCHLORIDE 15 MILLIGRAM(S): 5 TABLET ORAL at 05:32

## 2024-01-01 RX ADMIN — MIDODRINE HYDROCHLORIDE 30 MILLIGRAM(S): 5 TABLET ORAL at 21:51

## 2024-01-01 RX ADMIN — Medication 80 MILLIGRAM(S): at 21:59

## 2024-01-01 RX ADMIN — SOTALOL HYDROCHLORIDE 40 MILLIGRAM(S): 120 TABLET ORAL at 05:36

## 2024-01-01 RX ADMIN — PROPOFOL 11.8 MICROGRAM(S)/KG/MIN: 10 INJECTION, EMULSION INTRAVENOUS at 15:51

## 2024-01-01 RX ADMIN — MIDODRINE HYDROCHLORIDE 10 MILLIGRAM(S): 5 TABLET ORAL at 05:34

## 2024-01-01 RX ADMIN — Medication 500 MILLIGRAM(S): at 11:30

## 2024-01-01 RX ADMIN — Medication 40 MILLIGRAM(S): at 05:37

## 2024-01-01 RX ADMIN — Medication 1 MILLIGRAM(S): at 12:06

## 2024-01-01 RX ADMIN — SODIUM CHLORIDE 4 MILLILITER(S): 9 INJECTION INTRAMUSCULAR; INTRAVENOUS; SUBCUTANEOUS at 17:06

## 2024-01-01 RX ADMIN — SOTALOL HYDROCHLORIDE 40 MILLIGRAM(S): 120 TABLET ORAL at 05:19

## 2024-01-01 RX ADMIN — CHLORHEXIDINE GLUCONATE 15 MILLILITER(S): 40 SOLUTION TOPICAL at 05:30

## 2024-01-01 RX ADMIN — Medication 40 MILLIGRAM(S): at 09:14

## 2024-01-01 RX ADMIN — DROXIDOPA 200 MILLIGRAM(S): 300 CAPSULE ORAL at 22:57

## 2024-01-01 RX ADMIN — MIDODRINE HYDROCHLORIDE 10 MILLIGRAM(S): 5 TABLET ORAL at 13:58

## 2024-01-01 RX ADMIN — Medication 100 MILLIEQUIVALENT(S): at 03:33

## 2024-01-01 RX ADMIN — Medication 40 MILLIEQUIVALENT(S): at 02:26

## 2024-01-01 RX ADMIN — CHLORHEXIDINE GLUCONATE 1 APPLICATION(S): 40 SOLUTION TOPICAL at 05:28

## 2024-01-01 RX ADMIN — OCTREOTIDE ACETATE 250 MICROGRAM(S): 100 INJECTION, SOLUTION INTRAVENOUS; SUBCUTANEOUS at 05:39

## 2024-01-01 RX ADMIN — DROXIDOPA 200 MILLIGRAM(S): 300 CAPSULE ORAL at 21:03

## 2024-01-01 RX ADMIN — HEPARIN SODIUM 1800 UNIT(S)/HR: 1000 INJECTION INTRAVENOUS; SUBCUTANEOUS at 08:43

## 2024-01-01 RX ADMIN — Medication 1 TABLET(S): at 12:05

## 2024-01-01 RX ADMIN — Medication 100 MILLIEQUIVALENT(S): at 12:01

## 2024-01-01 RX ADMIN — CHLORHEXIDINE GLUCONATE 1 APPLICATION(S): 40 SOLUTION TOPICAL at 06:22

## 2024-01-01 RX ADMIN — Medication 40 MILLIGRAM(S): at 06:11

## 2024-01-01 RX ADMIN — Medication 1: at 07:45

## 2024-01-01 RX ADMIN — MIDODRINE HYDROCHLORIDE 15 MILLIGRAM(S): 5 TABLET ORAL at 11:06

## 2024-01-01 RX ADMIN — OCTREOTIDE ACETATE 250 MICROGRAM(S): 100 INJECTION, SOLUTION INTRAVENOUS; SUBCUTANEOUS at 14:55

## 2024-01-01 RX ADMIN — Medication 4: at 01:36

## 2024-01-01 RX ADMIN — Medication 100 GRAM(S): at 12:38

## 2024-01-01 RX ADMIN — Medication 1: at 23:10

## 2024-01-01 RX ADMIN — Medication 125 MILLIGRAM(S): at 17:30

## 2024-01-01 RX ADMIN — PETROLATUM 1 APPLICATION(S): 865 OINTMENT TOPICAL at 18:18

## 2024-01-01 RX ADMIN — PETROLATUM 1 APPLICATION(S): 865 OINTMENT TOPICAL at 05:11

## 2024-01-01 RX ADMIN — IPRATROPIUM BROMIDE AND ALBUTEROL SULFATE 3 MILLILITER(S): .5; 3 SOLUTION RESPIRATORY (INHALATION) at 06:30

## 2024-01-01 RX ADMIN — Medication 125 MILLIGRAM(S): at 05:31

## 2024-01-01 RX ADMIN — OCTREOTIDE ACETATE 250 MICROGRAM(S): 100 INJECTION, SOLUTION INTRAVENOUS; SUBCUTANEOUS at 21:01

## 2024-01-01 RX ADMIN — Medication 1 MILLIGRAM(S): at 11:16

## 2024-01-01 RX ADMIN — MIDODRINE HYDROCHLORIDE 20 MILLIGRAM(S): 5 TABLET ORAL at 05:52

## 2024-01-01 RX ADMIN — PIPERACILLIN SODIUM AND TAZOBACTAM SODIUM 25 GRAM(S): 3; .375 INJECTION, POWDER, FOR SOLUTION INTRAVENOUS at 17:06

## 2024-01-01 RX ADMIN — Medication 1 MILLIGRAM(S): at 12:22

## 2024-01-01 RX ADMIN — Medication 1 TABLET(S): at 11:53

## 2024-01-01 RX ADMIN — Medication 80 MILLIGRAM(S): at 21:41

## 2024-01-01 RX ADMIN — Medication 125 MILLIGRAM(S): at 12:09

## 2024-01-01 RX ADMIN — IPRATROPIUM BROMIDE AND ALBUTEROL SULFATE 3 MILLILITER(S): .5; 3 SOLUTION RESPIRATORY (INHALATION) at 12:39

## 2024-01-01 RX ADMIN — METOPROLOL TARTRATE 2.5 MILLIGRAM(S): 100 TABLET ORAL at 05:59

## 2024-01-01 RX ADMIN — PHENYLEPHRINE HYDROCHLORIDE 9.25 MICROGRAM(S)/KG/MIN: 10 INJECTION INTRAVENOUS at 05:31

## 2024-01-01 RX ADMIN — ALBUMIN (HUMAN) 50 MILLILITER(S): 5 SOLUTION INTRAVENOUS at 20:14

## 2024-01-01 RX ADMIN — Medication 80 MILLIGRAM(S): at 21:15

## 2024-01-01 RX ADMIN — Medication 125 MILLIGRAM(S): at 17:01

## 2024-01-01 RX ADMIN — Medication 40 MILLIGRAM(S): at 18:58

## 2024-01-01 RX ADMIN — PIPERACILLIN SODIUM AND TAZOBACTAM SODIUM 25 GRAM(S): 3; .375 INJECTION, POWDER, FOR SOLUTION INTRAVENOUS at 21:56

## 2024-01-01 RX ADMIN — Medication 1: at 06:50

## 2024-01-01 RX ADMIN — MIDODRINE HYDROCHLORIDE 30 MILLIGRAM(S): 5 TABLET ORAL at 05:38

## 2024-01-01 RX ADMIN — CHLORHEXIDINE GLUCONATE 1 APPLICATION(S): 40 SOLUTION TOPICAL at 06:56

## 2024-01-01 RX ADMIN — Medication 100 MILLIEQUIVALENT(S): at 05:22

## 2024-01-01 RX ADMIN — CANGRELOR 22.2 MICROGRAM(S)/KG/MIN: 50 INJECTION, POWDER, LYOPHILIZED, FOR SOLUTION INTRAVENOUS at 15:12

## 2024-01-01 RX ADMIN — MIDODRINE HYDROCHLORIDE 30 MILLIGRAM(S): 5 TABLET ORAL at 13:48

## 2024-01-01 RX ADMIN — Medication 50 MILLIEQUIVALENT(S): at 16:00

## 2024-01-01 RX ADMIN — PHENYLEPHRINE HYDROCHLORIDE 1.85 MICROGRAM(S)/KG/MIN: 10 INJECTION INTRAVENOUS at 05:52

## 2024-01-01 RX ADMIN — Medication 100 MILLIGRAM(S): at 11:11

## 2024-01-01 RX ADMIN — ALBUMIN (HUMAN) 50 MILLILITER(S): 5 SOLUTION INTRAVENOUS at 12:42

## 2024-01-01 RX ADMIN — HYDROCORTISONE 50 MILLIGRAM(S): 10 TABLET ORAL at 11:08

## 2024-01-01 RX ADMIN — IPRATROPIUM BROMIDE AND ALBUTEROL SULFATE 3 MILLILITER(S): .5; 3 SOLUTION RESPIRATORY (INHALATION) at 01:25

## 2024-01-01 RX ADMIN — Medication 1 APPLICATION(S): at 17:28

## 2024-01-01 RX ADMIN — DIGOXIN 208 MICROGRAM(S): 0.12 TABLET ORAL at 11:07

## 2024-01-01 RX ADMIN — DIGOXIN 62.5 MICROGRAM(S): 0.12 TABLET ORAL at 12:28

## 2024-01-01 RX ADMIN — Medication 100 MILLIGRAM(S): at 12:09

## 2024-01-01 RX ADMIN — OCTREOTIDE ACETATE 250 MICROGRAM(S): 100 INJECTION, SOLUTION INTRAVENOUS; SUBCUTANEOUS at 21:59

## 2024-01-01 RX ADMIN — MEROPENEM 100 MILLIGRAM(S): 500 INJECTION, POWDER, FOR SOLUTION INTRAVENOUS at 05:02

## 2024-01-01 RX ADMIN — SODIUM CHLORIDE 4 MILLILITER(S): 9 INJECTION INTRAMUSCULAR; INTRAVENOUS; SUBCUTANEOUS at 05:02

## 2024-01-01 RX ADMIN — Medication 1 APPLICATION(S): at 11:35

## 2024-01-01 RX ADMIN — Medication 1 MILLIGRAM(S): at 11:29

## 2024-01-01 RX ADMIN — Medication 125 MILLIGRAM(S): at 00:22

## 2024-01-01 RX ADMIN — Medication 1000 MILLILITER(S): at 12:53

## 2024-01-01 RX ADMIN — METOPROLOL TARTRATE 25 MILLIGRAM(S): 100 TABLET ORAL at 22:51

## 2024-01-01 RX ADMIN — Medication 1 TABLET(S): at 11:26

## 2024-01-01 RX ADMIN — OCTREOTIDE ACETATE 250 MICROGRAM(S): 100 INJECTION, SOLUTION INTRAVENOUS; SUBCUTANEOUS at 06:41

## 2024-01-01 RX ADMIN — IPRATROPIUM BROMIDE AND ALBUTEROL SULFATE 3 MILLILITER(S): .5; 3 SOLUTION RESPIRATORY (INHALATION) at 05:33

## 2024-01-01 RX ADMIN — Medication 1: at 05:27

## 2024-01-01 RX ADMIN — Medication 100 MILLIEQUIVALENT(S): at 04:21

## 2024-01-01 RX ADMIN — DEXMEDETOMIDINE HYDROCHLORIDE IN 0.9% SODIUM CHLORIDE 7.4 MICROGRAM(S)/KG/HR: 4 INJECTION INTRAVENOUS at 00:22

## 2024-01-01 RX ADMIN — ALBUMIN (HUMAN) 125 MILLILITER(S): 5 SOLUTION INTRAVENOUS at 23:54

## 2024-01-01 RX ADMIN — SOTALOL HYDROCHLORIDE 40 MILLIGRAM(S): 120 TABLET ORAL at 05:01

## 2024-01-01 RX ADMIN — Medication 7.5 MILLIGRAM(S): at 22:51

## 2024-01-01 RX ADMIN — SODIUM CHLORIDE 4 MILLILITER(S): 9 INJECTION INTRAMUSCULAR; INTRAVENOUS; SUBCUTANEOUS at 06:18

## 2024-01-01 RX ADMIN — Medication 75 MILLIGRAM(S): at 05:16

## 2024-01-01 RX ADMIN — ACETAMINOPHEN 650 MILLIGRAM(S): 325 TABLET ORAL at 22:33

## 2024-01-01 RX ADMIN — MIDODRINE HYDROCHLORIDE 10 MILLIGRAM(S): 5 TABLET ORAL at 05:32

## 2024-01-01 RX ADMIN — Medication 40 MILLIGRAM(S): at 05:31

## 2024-01-01 RX ADMIN — Medication 1 MILLIGRAM(S): at 13:25

## 2024-01-01 RX ADMIN — Medication 25 GRAM(S): at 05:37

## 2024-01-01 RX ADMIN — MEROPENEM 100 MILLIGRAM(S): 500 INJECTION, POWDER, FOR SOLUTION INTRAVENOUS at 21:23

## 2024-01-01 RX ADMIN — OCTREOTIDE ACETATE 250 MICROGRAM(S): 100 INJECTION, SOLUTION INTRAVENOUS; SUBCUTANEOUS at 13:29

## 2024-01-01 RX ADMIN — SODIUM CHLORIDE 4 MILLILITER(S): 9 INJECTION INTRAMUSCULAR; INTRAVENOUS; SUBCUTANEOUS at 16:55

## 2024-01-01 RX ADMIN — ACETAMINOPHEN 400 MILLIGRAM(S): 325 TABLET ORAL at 06:10

## 2024-01-01 RX ADMIN — OCTREOTIDE ACETATE 250 MICROGRAM(S): 100 INJECTION, SOLUTION INTRAVENOUS; SUBCUTANEOUS at 13:45

## 2024-01-01 RX ADMIN — Medication 40 MILLIEQUIVALENT(S): at 09:57

## 2024-01-01 RX ADMIN — PETROLATUM 1 APPLICATION(S): 865 OINTMENT TOPICAL at 16:40

## 2024-01-01 RX ADMIN — MIDODRINE HYDROCHLORIDE 20 MILLIGRAM(S): 5 TABLET ORAL at 05:16

## 2024-01-01 RX ADMIN — Medication 1 TABLET(S): at 11:48

## 2024-01-01 RX ADMIN — Medication 500 MILLIGRAM(S): at 12:25

## 2024-01-01 RX ADMIN — DIGOXIN 250 MICROGRAM(S): 0.12 TABLET ORAL at 21:43

## 2024-01-01 RX ADMIN — DIGOXIN 125 MICROGRAM(S): 0.12 TABLET ORAL at 05:09

## 2024-01-01 RX ADMIN — CHLORHEXIDINE GLUCONATE 1 APPLICATION(S): 40 SOLUTION TOPICAL at 05:51

## 2024-01-01 RX ADMIN — PIPERACILLIN SODIUM AND TAZOBACTAM SODIUM 25 GRAM(S): 3; .375 INJECTION, POWDER, FOR SOLUTION INTRAVENOUS at 17:49

## 2024-01-01 RX ADMIN — Medication 1: at 11:50

## 2024-01-01 RX ADMIN — PROPOFOL 11.8 MICROGRAM(S)/KG/MIN: 10 INJECTION, EMULSION INTRAVENOUS at 21:44

## 2024-01-01 RX ADMIN — Medication 40 MILLIGRAM(S): at 17:29

## 2024-01-01 RX ADMIN — Medication 40 MILLIGRAM(S): at 21:59

## 2024-01-01 RX ADMIN — BUMETANIDE 1 MILLIGRAM(S): 2 TABLET ORAL at 05:37

## 2024-01-01 RX ADMIN — Medication 125 MILLIGRAM(S): at 17:04

## 2024-01-01 RX ADMIN — ATORVASTATIN CALCIUM 80 MILLIGRAM(S): 80 TABLET, FILM COATED ORAL at 21:17

## 2024-01-01 RX ADMIN — METOPROLOL TARTRATE 12.5 MILLIGRAM(S): 100 TABLET ORAL at 18:58

## 2024-01-01 RX ADMIN — OCTREOTIDE ACETATE 250 MICROGRAM(S): 100 INJECTION, SOLUTION INTRAVENOUS; SUBCUTANEOUS at 21:35

## 2024-01-01 RX ADMIN — OCTREOTIDE ACETATE 250 MICROGRAM(S): 100 INJECTION, SOLUTION INTRAVENOUS; SUBCUTANEOUS at 21:14

## 2024-01-01 RX ADMIN — PIPERACILLIN SODIUM AND TAZOBACTAM SODIUM 25 GRAM(S): 3; .375 INJECTION, POWDER, FOR SOLUTION INTRAVENOUS at 06:19

## 2024-01-01 RX ADMIN — Medication 1 TABLET(S): at 18:10

## 2024-01-01 RX ADMIN — MIDODRINE HYDROCHLORIDE 15 MILLIGRAM(S): 5 TABLET ORAL at 05:11

## 2024-01-01 RX ADMIN — CHLORHEXIDINE GLUCONATE 15 MILLILITER(S): 40 SOLUTION TOPICAL at 17:20

## 2024-01-01 RX ADMIN — METOPROLOL TARTRATE 12.5 MILLIGRAM(S): 100 TABLET ORAL at 18:36

## 2024-01-01 RX ADMIN — ALBUMIN (HUMAN) 50 MILLILITER(S): 5 SOLUTION INTRAVENOUS at 17:21

## 2024-01-01 RX ADMIN — Medication 1: at 01:07

## 2024-01-01 RX ADMIN — Medication 100 MILLIGRAM(S): at 17:25

## 2024-01-01 RX ADMIN — Medication 2 MILLIGRAM(S): at 23:06

## 2024-01-01 RX ADMIN — METOPROLOL TARTRATE 12.5 MILLIGRAM(S): 100 TABLET ORAL at 05:08

## 2024-01-01 RX ADMIN — HYDROCORTISONE 50 MILLIGRAM(S): 10 TABLET ORAL at 11:33

## 2024-01-01 RX ADMIN — Medication 40 MILLIGRAM(S): at 05:08

## 2024-01-01 RX ADMIN — Medication 80 MILLIGRAM(S): at 21:50

## 2024-01-01 RX ADMIN — Medication 1 MILLIGRAM(S): at 13:16

## 2024-01-01 RX ADMIN — Medication 2 MILLIGRAM(S): at 21:55

## 2024-01-01 RX ADMIN — ACETAMINOPHEN 650 MILLIGRAM(S): 325 TABLET ORAL at 16:40

## 2024-01-01 RX ADMIN — Medication 100 MILLIGRAM(S): at 10:31

## 2024-01-01 RX ADMIN — METOPROLOL TARTRATE 25 MILLIGRAM(S): 100 TABLET ORAL at 14:28

## 2024-01-01 RX ADMIN — Medication 100 MILLIEQUIVALENT(S): at 04:25

## 2024-01-01 RX ADMIN — Medication 100 MILLIGRAM(S): at 05:53

## 2024-01-01 RX ADMIN — OCTREOTIDE ACETATE 250 MICROGRAM(S): 100 INJECTION, SOLUTION INTRAVENOUS; SUBCUTANEOUS at 17:27

## 2024-01-01 RX ADMIN — Medication 100 MILLIEQUIVALENT(S): at 22:27

## 2024-01-01 RX ADMIN — APIXABAN 5 MILLIGRAM(S): 5 TABLET, FILM COATED ORAL at 17:58

## 2024-01-01 RX ADMIN — PHENYLEPHRINE HYDROCHLORIDE 47.1 MICROGRAM(S)/KG/MIN: 10 INJECTION INTRAVENOUS at 23:02

## 2024-01-01 RX ADMIN — OCTREOTIDE ACETATE 250 MICROGRAM(S): 100 INJECTION, SOLUTION INTRAVENOUS; SUBCUTANEOUS at 06:05

## 2024-01-01 RX ADMIN — Medication 100 MILLIGRAM(S): at 22:44

## 2024-01-01 RX ADMIN — GUAIFENESIN 300 MILLIGRAM(S): 100 LIQUID ORAL at 11:09

## 2024-01-01 RX ADMIN — VASOPRESSIN 6 UNIT(S)/MIN: 20 INJECTION INTRAVENOUS at 05:58

## 2024-01-01 RX ADMIN — Medication 100 MILLIGRAM(S): at 12:29

## 2024-01-01 RX ADMIN — Medication 100 MILLIGRAM(S): at 12:24

## 2024-01-01 RX ADMIN — ATORVASTATIN CALCIUM 80 MILLIGRAM(S): 80 TABLET, FILM COATED ORAL at 21:26

## 2024-01-01 RX ADMIN — Medication 100 GRAM(S): at 11:52

## 2024-01-01 RX ADMIN — Medication 1 MILLIGRAM(S): at 12:52

## 2024-01-01 RX ADMIN — Medication 10 MG/HR: at 19:30

## 2024-01-01 RX ADMIN — SODIUM CHLORIDE 4 MILLILITER(S): 9 INJECTION INTRAMUSCULAR; INTRAVENOUS; SUBCUTANEOUS at 17:09

## 2024-01-01 RX ADMIN — Medication 100 MILLIGRAM(S): at 11:30

## 2024-01-01 RX ADMIN — Medication 1 MILLIGRAM(S): at 11:39

## 2024-01-01 RX ADMIN — PHENYLEPHRINE HYDROCHLORIDE 9.25 MICROGRAM(S)/KG/MIN: 10 INJECTION INTRAVENOUS at 19:23

## 2024-01-01 RX ADMIN — OXYCODONE HYDROCHLORIDE 400 MILLIGRAM(S): 15 TABLET ORAL at 21:41

## 2024-01-01 RX ADMIN — EZETIMIBE 10 MILLIGRAM(S): 10 TABLET ORAL at 21:19

## 2024-01-01 RX ADMIN — PIPERACILLIN SODIUM AND TAZOBACTAM SODIUM 25 GRAM(S): 3; .375 INJECTION, POWDER, FOR SOLUTION INTRAVENOUS at 03:21

## 2024-01-01 RX ADMIN — MIDODRINE HYDROCHLORIDE 15 MILLIGRAM(S): 5 TABLET ORAL at 19:15

## 2024-01-01 RX ADMIN — Medication 40 MILLIGRAM(S): at 11:24

## 2024-01-01 RX ADMIN — OCTREOTIDE ACETATE 250 MICROGRAM(S): 100 INJECTION, SOLUTION INTRAVENOUS; SUBCUTANEOUS at 14:20

## 2024-01-01 RX ADMIN — MIDODRINE HYDROCHLORIDE 30 MILLIGRAM(S): 5 TABLET ORAL at 21:23

## 2024-01-01 RX ADMIN — Medication 80 MILLIGRAM(S): at 21:23

## 2024-01-01 RX ADMIN — Medication 100 GRAM(S): at 20:23

## 2024-01-01 RX ADMIN — SODIUM BICARBONATE 1300 MILLIGRAM(S): 84 INJECTION, SOLUTION INTRAVENOUS at 21:42

## 2024-01-01 RX ADMIN — PROPOFOL 11.8 MICROGRAM(S)/KG/MIN: 10 INJECTION, EMULSION INTRAVENOUS at 07:56

## 2024-01-01 RX ADMIN — AMOXICILLIN AND CLAVULANATE POTASSIUM 1 TABLET(S): 500; 125 TABLET, FILM COATED ORAL at 17:09

## 2024-01-01 RX ADMIN — ACETAMINOPHEN 400 MILLIGRAM(S): 325 TABLET ORAL at 06:42

## 2024-01-01 RX ADMIN — ONDANSETRON 4 MILLIGRAM(S): 2 INJECTION, SOLUTION INTRAMUSCULAR; INTRAVENOUS at 14:04

## 2024-01-01 RX ADMIN — Medication 81 MILLIGRAM(S): at 11:21

## 2024-01-01 RX ADMIN — Medication 1: at 18:06

## 2024-01-01 RX ADMIN — PETROLATUM 1 APPLICATION(S): 865 OINTMENT TOPICAL at 16:50

## 2024-01-01 RX ADMIN — OXYCODONE HYDROCHLORIDE 200 MILLIGRAM(S): 15 TABLET ORAL at 05:01

## 2024-01-01 RX ADMIN — BUMETANIDE 2 MILLIGRAM(S): 2 TABLET ORAL at 20:12

## 2024-01-01 RX ADMIN — Medication 40 MILLIGRAM(S): at 17:10

## 2024-01-01 RX ADMIN — DROXIDOPA 100 MILLIGRAM(S): 300 CAPSULE ORAL at 05:19

## 2024-01-01 RX ADMIN — COLCHICINE 0.6 MILLIGRAM(S): 0.6 TABLET, FILM COATED ORAL at 10:57

## 2024-01-01 RX ADMIN — ACETAMINOPHEN 400 MILLIGRAM(S): 325 TABLET ORAL at 12:59

## 2024-01-01 RX ADMIN — Medication 500 MILLIGRAM(S): at 11:53

## 2024-01-01 RX ADMIN — DEXMEDETOMIDINE HYDROCHLORIDE IN 0.9% SODIUM CHLORIDE 2.47 MICROGRAM(S)/KG/HR: 4 INJECTION INTRAVENOUS at 11:15

## 2024-01-01 RX ADMIN — Medication 50 MILLIEQUIVALENT(S): at 12:44

## 2024-01-01 RX ADMIN — PETROLATUM 1 APPLICATION(S): 865 OINTMENT TOPICAL at 05:35

## 2024-01-01 RX ADMIN — Medication 0: at 17:50

## 2024-01-01 RX ADMIN — Medication 40 MILLIGRAM(S): at 18:14

## 2024-01-01 RX ADMIN — Medication 1: at 12:25

## 2024-01-01 RX ADMIN — CHLORHEXIDINE GLUCONATE 1 APPLICATION(S): 40 SOLUTION TOPICAL at 05:13

## 2024-01-01 RX ADMIN — MIDODRINE HYDROCHLORIDE 10 MILLIGRAM(S): 5 TABLET ORAL at 05:36

## 2024-01-01 RX ADMIN — Medication 100 MILLIGRAM(S): at 17:33

## 2024-01-01 RX ADMIN — Medication 10 MG/HR: at 12:11

## 2024-01-01 RX ADMIN — OCTREOTIDE ACETATE 250 MICROGRAM(S): 100 INJECTION, SOLUTION INTRAVENOUS; SUBCUTANEOUS at 22:06

## 2024-01-01 RX ADMIN — Medication 1 MILLIGRAM(S): at 11:12

## 2024-01-01 RX ADMIN — CHLORHEXIDINE GLUCONATE 15 MILLILITER(S): 40 SOLUTION TOPICAL at 05:53

## 2024-01-01 RX ADMIN — Medication 100 MILLIGRAM(S): at 12:19

## 2024-01-01 RX ADMIN — PHENYLEPHRINE HYDROCHLORIDE 1.85 MICROGRAM(S)/KG/MIN: 10 INJECTION INTRAVENOUS at 21:55

## 2024-01-01 RX ADMIN — Medication 1: at 17:09

## 2024-01-01 RX ADMIN — IPRATROPIUM BROMIDE AND ALBUTEROL SULFATE 3 MILLILITER(S): .5; 3 SOLUTION RESPIRATORY (INHALATION) at 05:16

## 2024-01-01 RX ADMIN — Medication 125 MILLIGRAM(S): at 00:26

## 2024-01-01 RX ADMIN — Medication 100 MILLIEQUIVALENT(S): at 18:31

## 2024-01-01 RX ADMIN — PIPERACILLIN SODIUM AND TAZOBACTAM SODIUM 25 GRAM(S): 3; .375 INJECTION, POWDER, FOR SOLUTION INTRAVENOUS at 17:42

## 2024-01-01 RX ADMIN — ACETAMINOPHEN 400 MILLIGRAM(S): 325 TABLET ORAL at 20:15

## 2024-01-01 RX ADMIN — POTASSIUM PHOSPHATE 83.33 MILLIMOLE(S): 236; 224 INJECTION, SOLUTION INTRAVENOUS at 06:05

## 2024-01-01 RX ADMIN — HYDROCORTISONE 50 MILLIGRAM(S): 10 TABLET ORAL at 05:36

## 2024-01-01 RX ADMIN — Medication 125 MILLIGRAM(S): at 11:33

## 2024-01-01 RX ADMIN — HEPARIN SODIUM 1800 UNIT(S)/HR: 1000 INJECTION INTRAVENOUS; SUBCUTANEOUS at 18:32

## 2024-01-01 RX ADMIN — Medication 1 MILLIGRAM(S): at 12:35

## 2024-01-01 RX ADMIN — CHLORHEXIDINE GLUCONATE 1 APPLICATION(S): 40 SOLUTION TOPICAL at 05:41

## 2024-01-01 RX ADMIN — PHENYLEPHRINE HYDROCHLORIDE 47.1 MICROGRAM(S)/KG/MIN: 10 INJECTION INTRAVENOUS at 01:03

## 2024-01-01 RX ADMIN — MIDODRINE HYDROCHLORIDE 10 MILLIGRAM(S): 5 TABLET ORAL at 06:28

## 2024-01-01 RX ADMIN — METOPROLOL TARTRATE 12.5 MILLIGRAM(S): 100 TABLET ORAL at 19:00

## 2024-01-01 RX ADMIN — BUMETANIDE 2 MILLIGRAM(S): 2 TABLET ORAL at 10:30

## 2024-01-01 RX ADMIN — HYDROCORTISONE 50 MILLIGRAM(S): 10 TABLET ORAL at 17:21

## 2024-01-01 RX ADMIN — METOPROLOL TARTRATE 25 MILLIGRAM(S): 100 TABLET ORAL at 21:14

## 2024-01-01 RX ADMIN — Medication 1 TABLET(S): at 17:09

## 2024-01-01 RX ADMIN — Medication 125 MILLIGRAM(S): at 11:07

## 2024-01-01 RX ADMIN — MEROPENEM 100 MILLIGRAM(S): 500 INJECTION, POWDER, FOR SOLUTION INTRAVENOUS at 06:31

## 2024-01-01 RX ADMIN — POTASSIUM PHOSPHATE 83.33 MILLIMOLE(S): 236; 224 INJECTION, SOLUTION INTRAVENOUS at 14:10

## 2024-01-01 RX ADMIN — FENTANYL CITRATE 50 MICROGRAM(S): 50 INJECTION INTRAMUSCULAR; INTRAVENOUS at 11:47

## 2024-01-01 RX ADMIN — CHLORHEXIDINE GLUCONATE 1 APPLICATION(S): 40 SOLUTION TOPICAL at 06:17

## 2024-01-01 RX ADMIN — HEPARIN SODIUM 1800 UNIT(S)/HR: 1000 INJECTION INTRAVENOUS; SUBCUTANEOUS at 07:20

## 2024-01-01 RX ADMIN — Medication 1 APPLICATION(S): at 12:26

## 2024-01-01 RX ADMIN — CHLORHEXIDINE GLUCONATE 1 APPLICATION(S): 40 SOLUTION TOPICAL at 05:31

## 2024-01-01 RX ADMIN — Medication 1: at 12:14

## 2024-01-01 RX ADMIN — MEROPENEM 100 MILLIGRAM(S): 500 INJECTION, POWDER, FOR SOLUTION INTRAVENOUS at 05:36

## 2024-01-01 RX ADMIN — ALBUMIN (HUMAN) 125 MILLILITER(S): 5 SOLUTION INTRAVENOUS at 00:26

## 2024-01-01 RX ADMIN — OCTREOTIDE ACETATE 250 MICROGRAM(S): 100 INJECTION, SOLUTION INTRAVENOUS; SUBCUTANEOUS at 06:02

## 2024-01-01 RX ADMIN — METOPROLOL TARTRATE 12.5 MILLIGRAM(S): 100 TABLET ORAL at 06:11

## 2024-01-01 RX ADMIN — Medication 100 MILLIEQUIVALENT(S): at 04:40

## 2024-01-01 RX ADMIN — Medication 100 MILLIGRAM(S): at 05:18

## 2024-01-01 RX ADMIN — Medication 40 MILLIEQUIVALENT(S): at 05:37

## 2024-01-01 RX ADMIN — CHLORHEXIDINE GLUCONATE 15 MILLILITER(S): 40 SOLUTION TOPICAL at 06:31

## 2024-01-01 RX ADMIN — Medication 1 MILLIGRAM(S): at 13:05

## 2024-01-01 RX ADMIN — FENTANYL CITRATE 100 MICROGRAM(S): 50 INJECTION INTRAMUSCULAR; INTRAVENOUS at 18:15

## 2024-01-01 RX ADMIN — MIDODRINE HYDROCHLORIDE 30 MILLIGRAM(S): 5 TABLET ORAL at 21:54

## 2024-01-01 RX ADMIN — APIXABAN 5 MILLIGRAM(S): 2.5 TABLET, FILM COATED ORAL at 17:55

## 2024-01-01 RX ADMIN — PIPERACILLIN SODIUM AND TAZOBACTAM SODIUM 25 GRAM(S): 3; .375 INJECTION, POWDER, FOR SOLUTION INTRAVENOUS at 05:30

## 2024-01-01 RX ADMIN — OCTREOTIDE ACETATE 250 MICROGRAM(S): 100 INJECTION, SOLUTION INTRAVENOUS; SUBCUTANEOUS at 21:56

## 2024-01-01 RX ADMIN — CEFEPIME 100 MILLIGRAM(S): 2 INJECTION, POWDER, FOR SOLUTION INTRAVENOUS at 02:20

## 2024-01-01 RX ADMIN — Medication 1 MILLIGRAM(S): at 13:55

## 2024-01-01 RX ADMIN — PIPERACILLIN SODIUM AND TAZOBACTAM SODIUM 25 GRAM(S): 3; .375 INJECTION, POWDER, FOR SOLUTION INTRAVENOUS at 22:56

## 2024-01-01 RX ADMIN — METOPROLOL TARTRATE 5 MILLIGRAM(S): 100 TABLET ORAL at 10:31

## 2024-01-01 RX ADMIN — DIGOXIN 125 MICROGRAM(S): 0.12 TABLET ORAL at 11:58

## 2024-01-01 RX ADMIN — MIDODRINE HYDROCHLORIDE 10 MILLIGRAM(S): 5 TABLET ORAL at 13:20

## 2024-01-01 RX ADMIN — ACETAMINOPHEN 200 MILLIGRAM(S): 325 TABLET ORAL at 22:50

## 2024-01-01 RX ADMIN — Medication 1: at 00:25

## 2024-01-01 RX ADMIN — SODIUM CHLORIDE 4 MILLILITER(S): 9 INJECTION INTRAMUSCULAR; INTRAVENOUS; SUBCUTANEOUS at 18:10

## 2024-01-01 RX ADMIN — POTASSIUM PHOSPHATE 83.33 MILLIMOLE(S): 236; 224 INJECTION, SOLUTION INTRAVENOUS at 12:42

## 2024-01-01 RX ADMIN — METOPROLOL TARTRATE 25 MILLIGRAM(S): 100 TABLET ORAL at 07:29

## 2024-01-01 RX ADMIN — MIDODRINE HYDROCHLORIDE 10 MILLIGRAM(S): 5 TABLET ORAL at 13:33

## 2024-01-01 RX ADMIN — MIDODRINE HYDROCHLORIDE 10 MILLIGRAM(S): 5 TABLET ORAL at 05:06

## 2024-01-01 RX ADMIN — DEXMEDETOMIDINE HYDROCHLORIDE IN 0.9% SODIUM CHLORIDE 2.47 MICROGRAM(S)/KG/HR: 4 INJECTION INTRAVENOUS at 21:44

## 2024-01-01 RX ADMIN — METOPROLOL TARTRATE 5 MILLIGRAM(S): 100 TABLET ORAL at 11:31

## 2024-01-01 RX ADMIN — Medication 500 MILLIGRAM(S): at 17:09

## 2024-01-01 RX ADMIN — OCTREOTIDE ACETATE 250 MICROGRAM(S): 100 INJECTION, SOLUTION INTRAVENOUS; SUBCUTANEOUS at 13:25

## 2024-01-01 RX ADMIN — Medication 100 MILLIEQUIVALENT(S): at 03:40

## 2024-01-01 RX ADMIN — AMOXICILLIN AND CLAVULANATE POTASSIUM 1 TABLET(S): 500; 125 TABLET, FILM COATED ORAL at 05:01

## 2024-01-01 RX ADMIN — Medication 1: at 05:31

## 2024-01-01 RX ADMIN — Medication 25 GRAM(S): at 11:31

## 2024-01-01 RX ADMIN — PETROLATUM 1 APPLICATION(S): 865 OINTMENT TOPICAL at 18:20

## 2024-01-01 RX ADMIN — Medication 100 MILLIEQUIVALENT(S): at 05:46

## 2024-01-01 RX ADMIN — Medication 100 MILLIEQUIVALENT(S): at 02:01

## 2024-01-01 RX ADMIN — CLOPIDOGREL BISULFATE 75 MILLIGRAM(S): 75 TABLET, FILM COATED ORAL at 11:53

## 2024-01-01 RX ADMIN — SODIUM CHLORIDE 4 MILLILITER(S): 9 INJECTION INTRAMUSCULAR; INTRAVENOUS; SUBCUTANEOUS at 05:21

## 2024-01-01 RX ADMIN — SODIUM BICARBONATE 1950 MILLIGRAM(S): 84 INJECTION, SOLUTION INTRAVENOUS at 05:33

## 2024-01-01 RX ADMIN — HYDROCORTISONE 50 MILLIGRAM(S): 10 TABLET ORAL at 05:23

## 2024-01-01 RX ADMIN — OCTREOTIDE ACETATE 250 MICROGRAM(S): 100 INJECTION, SOLUTION INTRAVENOUS; SUBCUTANEOUS at 05:05

## 2024-01-01 RX ADMIN — SOTALOL HYDROCHLORIDE 40 MILLIGRAM(S): 120 TABLET ORAL at 04:46

## 2024-01-01 RX ADMIN — Medication 40 MILLIGRAM(S): at 18:38

## 2024-01-01 RX ADMIN — SODIUM PHOSPHATE, DIBASIC, ANHYDROUS, POTASSIUM PHOSPHATE, MONOBASIC, AND SODIUM PHOSPHATE, MONOBASIC, MONOHYDRATE 2 PACKET(S): 852; 155; 130 TABLET, COATED ORAL at 10:18

## 2024-01-01 RX ADMIN — Medication 80 MILLIGRAM(S): at 23:33

## 2024-01-01 RX ADMIN — DROXIDOPA 300 MILLIGRAM(S): 300 CAPSULE ORAL at 05:37

## 2024-01-01 RX ADMIN — Medication 1 APPLICATION(S): at 17:11

## 2024-01-01 RX ADMIN — Medication 1 APPLICATION(S): at 13:11

## 2024-01-01 RX ADMIN — SODIUM BICARBONATE 1300 MILLIGRAM(S): 84 INJECTION, SOLUTION INTRAVENOUS at 05:56

## 2024-01-01 RX ADMIN — OCTREOTIDE ACETATE 250 MICROGRAM(S): 100 INJECTION, SOLUTION INTRAVENOUS; SUBCUTANEOUS at 07:00

## 2024-01-01 RX ADMIN — DROXIDOPA 100 MILLIGRAM(S): 300 CAPSULE ORAL at 13:22

## 2024-01-01 RX ADMIN — MEROPENEM 100 MILLIGRAM(S): 500 INJECTION, POWDER, FOR SOLUTION INTRAVENOUS at 15:58

## 2024-01-01 RX ADMIN — METOPROLOL TARTRATE 25 MILLIGRAM(S): 100 TABLET ORAL at 17:39

## 2024-01-01 RX ADMIN — Medication 125 MILLIGRAM(S): at 17:05

## 2024-01-01 RX ADMIN — CANGRELOR 22.2 MICROGRAM(S)/KG/MIN: 50 INJECTION, POWDER, LYOPHILIZED, FOR SOLUTION INTRAVENOUS at 20:49

## 2024-01-01 RX ADMIN — PETROLATUM 1 APPLICATION(S): 865 OINTMENT TOPICAL at 05:40

## 2024-01-01 RX ADMIN — HYDROCORTISONE 50 MILLIGRAM(S): 10 TABLET ORAL at 17:27

## 2024-01-01 RX ADMIN — CHLORHEXIDINE GLUCONATE 15 MILLILITER(S): 40 SOLUTION TOPICAL at 05:11

## 2024-01-01 RX ADMIN — Medication 40 MILLIGRAM(S): at 05:09

## 2024-01-01 RX ADMIN — Medication 100 MILLIEQUIVALENT(S): at 07:39

## 2024-01-01 RX ADMIN — HYDROCORTISONE 50 MILLIGRAM(S): 10 TABLET ORAL at 05:03

## 2024-01-01 RX ADMIN — Medication 100 MILLIGRAM(S): at 17:56

## 2024-01-01 RX ADMIN — CEFEPIME 100 MILLIGRAM(S): 2 INJECTION, POWDER, FOR SOLUTION INTRAVENOUS at 21:45

## 2024-01-01 RX ADMIN — HYDROCORTISONE 50 MILLIGRAM(S): 10 TABLET ORAL at 11:57

## 2024-01-01 RX ADMIN — MIDODRINE HYDROCHLORIDE 30 MILLIGRAM(S): 5 TABLET ORAL at 21:03

## 2024-01-01 RX ADMIN — Medication 75 MILLIGRAM(S): at 06:52

## 2024-01-01 RX ADMIN — Medication 40 MILLIEQUIVALENT(S): at 20:24

## 2024-01-01 RX ADMIN — BUMETANIDE 2 MILLIGRAM(S): 2 TABLET ORAL at 05:03

## 2024-01-01 RX ADMIN — Medication 500 MILLIGRAM(S): at 11:48

## 2024-01-01 RX ADMIN — Medication 100 MILLIEQUIVALENT(S): at 17:40

## 2024-01-01 RX ADMIN — MIDODRINE HYDROCHLORIDE 10 MILLIGRAM(S): 5 TABLET ORAL at 22:13

## 2024-01-01 RX ADMIN — ACETAMINOPHEN 400 MILLIGRAM(S): 325 TABLET ORAL at 20:39

## 2024-01-01 RX ADMIN — METOPROLOL TARTRATE 12.5 MILLIGRAM(S): 100 TABLET ORAL at 17:27

## 2024-01-01 RX ADMIN — DROXIDOPA 200 MILLIGRAM(S): 300 CAPSULE ORAL at 13:07

## 2024-01-01 RX ADMIN — SODIUM CHLORIDE 4 MILLILITER(S): 9 INJECTION INTRAMUSCULAR; INTRAVENOUS; SUBCUTANEOUS at 05:33

## 2024-01-01 RX ADMIN — SODIUM BICARBONATE 1300 MILLIGRAM(S): 84 INJECTION, SOLUTION INTRAVENOUS at 13:37

## 2024-01-01 RX ADMIN — CHLORHEXIDINE GLUCONATE 15 MILLILITER(S): 40 SOLUTION TOPICAL at 05:38

## 2024-01-01 RX ADMIN — Medication 50 MILLILITER(S): at 10:34

## 2024-01-01 RX ADMIN — Medication 1: at 12:09

## 2024-01-01 RX ADMIN — CEFEPIME 100 MILLIGRAM(S): 2 INJECTION, POWDER, FOR SOLUTION INTRAVENOUS at 05:12

## 2024-01-01 RX ADMIN — Medication 1 MILLIGRAM(S): at 12:07

## 2024-01-01 RX ADMIN — SODIUM CHLORIDE 4 MILLILITER(S): 9 INJECTION INTRAMUSCULAR; INTRAVENOUS; SUBCUTANEOUS at 17:43

## 2024-01-01 RX ADMIN — MIDODRINE HYDROCHLORIDE 15 MILLIGRAM(S): 5 TABLET ORAL at 11:49

## 2024-01-01 RX ADMIN — AMOXICILLIN AND CLAVULANATE POTASSIUM 1 TABLET(S): 500; 125 TABLET, FILM COATED ORAL at 06:30

## 2024-01-01 RX ADMIN — Medication 1000 MILLILITER(S): at 05:30

## 2024-01-01 RX ADMIN — Medication 250 MILLIGRAM(S): at 00:34

## 2024-01-01 RX ADMIN — Medication 100 MILLIEQUIVALENT(S): at 16:42

## 2024-01-01 RX ADMIN — Medication 125 MILLIGRAM(S): at 00:31

## 2024-01-01 RX ADMIN — Medication 100 MILLIEQUIVALENT(S): at 06:58

## 2024-01-01 RX ADMIN — Medication 9.25 MICROGRAM(S)/KG/MIN: at 05:58

## 2024-01-01 RX ADMIN — CEFEPIME 100 MILLIGRAM(S): 2 INJECTION, POWDER, FOR SOLUTION INTRAVENOUS at 07:04

## 2024-01-01 RX ADMIN — SODIUM CHLORIDE 4 MILLILITER(S): 9 INJECTION INTRAMUSCULAR; INTRAVENOUS; SUBCUTANEOUS at 18:53

## 2024-01-01 RX ADMIN — PETROLATUM 1 APPLICATION(S): 865 OINTMENT TOPICAL at 17:38

## 2024-01-01 RX ADMIN — MEROPENEM 100 MILLIGRAM(S): 500 INJECTION, POWDER, FOR SOLUTION INTRAVENOUS at 21:42

## 2024-01-01 RX ADMIN — OCTREOTIDE ACETATE 250 MICROGRAM(S): 100 INJECTION, SOLUTION INTRAVENOUS; SUBCUTANEOUS at 13:54

## 2024-01-01 RX ADMIN — OCTREOTIDE ACETATE 250 MICROGRAM(S): 100 INJECTION, SOLUTION INTRAVENOUS; SUBCUTANEOUS at 05:12

## 2024-01-01 RX ADMIN — MEROPENEM 100 MILLIGRAM(S): 500 INJECTION, POWDER, FOR SOLUTION INTRAVENOUS at 13:21

## 2024-01-01 RX ADMIN — Medication 100 MILLILITER(S): at 19:00

## 2024-01-01 RX ADMIN — Medication 25 GRAM(S): at 12:44

## 2024-01-01 RX ADMIN — DIGOXIN 250 MICROGRAM(S): 0.12 TABLET ORAL at 06:41

## 2024-01-01 RX ADMIN — Medication 100 MILLIEQUIVALENT(S): at 21:08

## 2024-01-01 RX ADMIN — PIPERACILLIN SODIUM AND TAZOBACTAM SODIUM 25 GRAM(S): 3; .375 INJECTION, POWDER, FOR SOLUTION INTRAVENOUS at 13:16

## 2024-01-01 RX ADMIN — Medication 100 MILLIEQUIVALENT(S): at 00:40

## 2024-01-01 RX ADMIN — Medication 7.5 MILLIGRAM(S): at 22:05

## 2024-01-01 RX ADMIN — Medication 1 MILLIGRAM(S): at 12:20

## 2024-01-01 RX ADMIN — BUMETANIDE 1 MILLIGRAM(S): 2 TABLET ORAL at 05:13

## 2024-01-01 RX ADMIN — METOPROLOL TARTRATE 5 MILLIGRAM(S): 100 TABLET ORAL at 08:15

## 2024-01-01 RX ADMIN — OCTREOTIDE ACETATE 250 MICROGRAM(S): 100 INJECTION, SOLUTION INTRAVENOUS; SUBCUTANEOUS at 23:03

## 2024-01-01 RX ADMIN — AMOXICILLIN AND CLAVULANATE POTASSIUM 1 TABLET(S): 500; 125 TABLET, FILM COATED ORAL at 17:33

## 2024-01-01 RX ADMIN — Medication 1 TABLET(S): at 12:06

## 2024-01-01 RX ADMIN — Medication 40 MILLIGRAM(S): at 05:04

## 2024-01-01 RX ADMIN — ALBUMIN (HUMAN) 250 MILLILITER(S): 5 SOLUTION INTRAVENOUS at 05:16

## 2024-01-01 RX ADMIN — Medication 80 MILLIGRAM(S): at 21:51

## 2024-01-01 RX ADMIN — MEROPENEM 100 MILLIGRAM(S): 500 INJECTION, POWDER, FOR SOLUTION INTRAVENOUS at 05:37

## 2024-01-01 RX ADMIN — OCTREOTIDE ACETATE 250 MICROGRAM(S): 100 INJECTION, SOLUTION INTRAVENOUS; SUBCUTANEOUS at 05:09

## 2024-01-01 RX ADMIN — BUMETANIDE 1 MILLIGRAM(S): 2 TABLET ORAL at 11:56

## 2024-01-01 RX ADMIN — Medication 1 MILLIGRAM(S): at 17:27

## 2024-01-01 RX ADMIN — CHLORHEXIDINE GLUCONATE 1 APPLICATION(S): 40 SOLUTION TOPICAL at 05:39

## 2024-01-01 RX ADMIN — OCTREOTIDE ACETATE 250 MICROGRAM(S): 100 INJECTION, SOLUTION INTRAVENOUS; SUBCUTANEOUS at 05:03

## 2024-01-01 RX ADMIN — OXYCODONE HYDROCHLORIDE 400 MILLIGRAM(S): 15 TABLET ORAL at 05:38

## 2024-01-01 RX ADMIN — CHLORHEXIDINE GLUCONATE 15 MILLILITER(S): 40 SOLUTION TOPICAL at 17:01

## 2024-01-01 RX ADMIN — MIDODRINE HYDROCHLORIDE 30 MILLIGRAM(S): 5 TABLET ORAL at 05:34

## 2024-01-01 RX ADMIN — OCTREOTIDE ACETATE 250 MICROGRAM(S): 100 INJECTION, SOLUTION INTRAVENOUS; SUBCUTANEOUS at 13:19

## 2024-01-01 RX ADMIN — OCTREOTIDE ACETATE 250 MICROGRAM(S): 100 INJECTION, SOLUTION INTRAVENOUS; SUBCUTANEOUS at 15:48

## 2024-01-01 RX ADMIN — MEROPENEM 100 MILLIGRAM(S): 500 INJECTION, POWDER, FOR SOLUTION INTRAVENOUS at 05:53

## 2024-01-01 RX ADMIN — HEPARIN SODIUM 1800 UNIT(S)/HR: 1000 INJECTION INTRAVENOUS; SUBCUTANEOUS at 07:27

## 2024-01-01 RX ADMIN — Medication 100 MILLIEQUIVALENT(S): at 14:07

## 2024-01-01 RX ADMIN — Medication 40 MILLIGRAM(S): at 05:13

## 2024-01-01 RX ADMIN — OCTREOTIDE ACETATE 250 MICROGRAM(S): 100 INJECTION, SOLUTION INTRAVENOUS; SUBCUTANEOUS at 21:57

## 2024-01-01 RX ADMIN — BUMETANIDE 2 MILLIGRAM(S): 2 TABLET ORAL at 05:31

## 2024-01-01 RX ADMIN — FENTANYL CITRATE 50 MICROGRAM(S): 50 INJECTION INTRAMUSCULAR; INTRAVENOUS at 22:05

## 2024-01-01 RX ADMIN — PHENYLEPHRINE HYDROCHLORIDE 1.85 MICROGRAM(S)/KG/MIN: 10 INJECTION INTRAVENOUS at 16:58

## 2024-01-01 RX ADMIN — MIDODRINE HYDROCHLORIDE 10 MILLIGRAM(S): 5 TABLET ORAL at 14:28

## 2024-01-01 RX ADMIN — OCTREOTIDE ACETATE 250 MICROGRAM(S): 100 INJECTION, SOLUTION INTRAVENOUS; SUBCUTANEOUS at 21:28

## 2024-01-01 RX ADMIN — ALBUMIN (HUMAN) 125 MILLILITER(S): 5 SOLUTION INTRAVENOUS at 08:13

## 2024-01-01 RX ADMIN — Medication 1 APPLICATION(S): at 17:07

## 2024-01-01 RX ADMIN — Medication 1 TABLET(S): at 11:06

## 2024-01-01 RX ADMIN — OXYCODONE HYDROCHLORIDE 400 MILLIGRAM(S): 15 TABLET ORAL at 21:36

## 2024-01-01 RX ADMIN — METOPROLOL TARTRATE 5 MILLIGRAM(S): 100 TABLET ORAL at 23:56

## 2024-01-01 RX ADMIN — MEROPENEM 100 MILLIGRAM(S): 500 INJECTION, POWDER, FOR SOLUTION INTRAVENOUS at 21:54

## 2024-01-01 RX ADMIN — ALBUMIN (HUMAN) 50 MILLILITER(S): 5 SOLUTION INTRAVENOUS at 05:40

## 2024-01-01 RX ADMIN — LIDOCAINE HYDROCHLORIDE 1 PATCH: 20 JELLY TOPICAL at 20:50

## 2024-01-01 RX ADMIN — Medication 125 MILLIGRAM(S): at 05:52

## 2024-01-01 RX ADMIN — Medication 1 PATCH: at 12:20

## 2024-01-01 RX ADMIN — SODIUM BICARBONATE 650 MILLIGRAM(S): 84 INJECTION, SOLUTION INTRAVENOUS at 05:26

## 2024-01-01 RX ADMIN — BUMETANIDE 1 MILLIGRAM(S): 2 TABLET ORAL at 05:25

## 2024-01-01 RX ADMIN — BUMETANIDE 1 MILLIGRAM(S): 2 TABLET ORAL at 05:22

## 2024-01-01 RX ADMIN — OXYCODONE HYDROCHLORIDE 33.3 MG/MIN: 15 TABLET ORAL at 15:46

## 2024-01-01 RX ADMIN — Medication 100 MILLIEQUIVALENT(S): at 01:56

## 2024-01-01 RX ADMIN — OCTREOTIDE ACETATE 250 MICROGRAM(S): 100 INJECTION, SOLUTION INTRAVENOUS; SUBCUTANEOUS at 13:21

## 2024-01-01 RX ADMIN — Medication 100 MILLIEQUIVALENT(S): at 13:16

## 2024-01-01 RX ADMIN — SODIUM CHLORIDE 4 MILLILITER(S): 9 INJECTION INTRAMUSCULAR; INTRAVENOUS; SUBCUTANEOUS at 05:17

## 2024-01-01 RX ADMIN — Medication 100 MILLIGRAM(S): at 05:56

## 2024-01-01 RX ADMIN — DROXIDOPA 300 MILLIGRAM(S): 300 CAPSULE ORAL at 13:48

## 2024-01-01 RX ADMIN — SODIUM BICARBONATE 650 MILLIGRAM(S): 84 INJECTION, SOLUTION INTRAVENOUS at 13:48

## 2024-01-01 RX ADMIN — DROXIDOPA 300 MILLIGRAM(S): 300 CAPSULE ORAL at 06:53

## 2024-01-01 RX ADMIN — PIPERACILLIN SODIUM AND TAZOBACTAM SODIUM 25 GRAM(S): 3; .375 INJECTION, POWDER, FOR SOLUTION INTRAVENOUS at 07:31

## 2024-01-01 RX ADMIN — MIDODRINE HYDROCHLORIDE 10 MILLIGRAM(S): 5 TABLET ORAL at 21:40

## 2024-01-01 RX ADMIN — POTASSIUM PHOSPHATE 83.33 MILLIMOLE(S): 236; 224 INJECTION, SOLUTION INTRAVENOUS at 11:08

## 2024-01-01 RX ADMIN — HYDROCORTISONE 50 MILLIGRAM(S): 10 TABLET ORAL at 12:24

## 2024-01-01 RX ADMIN — CHLORHEXIDINE GLUCONATE 15 MILLILITER(S): 40 SOLUTION TOPICAL at 17:49

## 2024-01-01 RX ADMIN — Medication 125 MILLIGRAM(S): at 05:36

## 2024-01-01 RX ADMIN — DROXIDOPA 300 MILLIGRAM(S): 300 CAPSULE ORAL at 05:32

## 2024-01-01 RX ADMIN — Medication 9.25 MICROGRAM(S)/KG/MIN: at 11:35

## 2024-01-01 RX ADMIN — DIGOXIN 125 MICROGRAM(S): 0.12 TABLET ORAL at 05:01

## 2024-01-01 RX ADMIN — CHLORHEXIDINE GLUCONATE 15 MILLILITER(S): 40 SOLUTION TOPICAL at 06:23

## 2024-01-01 RX ADMIN — Medication 81 MILLIGRAM(S): at 11:30

## 2024-01-01 RX ADMIN — MIDODRINE HYDROCHLORIDE 15 MILLIGRAM(S): 5 TABLET ORAL at 17:25

## 2024-01-01 RX ADMIN — TORSEMIDE 20 MILLIGRAM(S): 20 TABLET ORAL at 05:12

## 2024-01-01 RX ADMIN — PIPERACILLIN SODIUM AND TAZOBACTAM SODIUM 25 GRAM(S): 3; .375 INJECTION, POWDER, FOR SOLUTION INTRAVENOUS at 13:04

## 2024-01-01 RX ADMIN — HYDROMORPHONE HYDROCHLORIDE 0.5 MILLIGRAM(S): 2 TABLET ORAL at 02:50

## 2024-01-01 RX ADMIN — Medication 40 MILLIGRAM(S): at 05:18

## 2024-01-01 RX ADMIN — Medication 2 MILLIGRAM(S): at 21:51

## 2024-01-01 RX ADMIN — Medication 100 MILLIEQUIVALENT(S): at 21:50

## 2024-01-01 RX ADMIN — Medication 40 MILLIGRAM(S): at 17:27

## 2024-01-01 RX ADMIN — PHENYLEPHRINE HYDROCHLORIDE 1.85 MICROGRAM(S)/KG/MIN: 10 INJECTION INTRAVENOUS at 20:10

## 2024-01-01 RX ADMIN — SODIUM BICARBONATE 1300 MILLIGRAM(S): 84 INJECTION, SOLUTION INTRAVENOUS at 05:58

## 2024-01-01 RX ADMIN — Medication 1 MILLIGRAM(S): at 13:02

## 2024-01-01 RX ADMIN — OXYCODONE HYDROCHLORIDE 400 MILLIGRAM(S): 15 TABLET ORAL at 06:30

## 2024-01-01 RX ADMIN — BUMETANIDE 1 MILLIGRAM(S): 2 TABLET ORAL at 06:11

## 2024-01-01 RX ADMIN — Medication 1 MILLIGRAM(S): at 11:42

## 2024-01-01 RX ADMIN — ACETAMINOPHEN 1000 MILLIGRAM(S): 325 TABLET ORAL at 11:50

## 2024-01-01 RX ADMIN — PHENYLEPHRINE HYDROCHLORIDE 9.25 MICROGRAM(S)/KG/MIN: 10 INJECTION INTRAVENOUS at 05:58

## 2024-01-01 RX ADMIN — MIDODRINE HYDROCHLORIDE 30 MILLIGRAM(S): 5 TABLET ORAL at 13:05

## 2024-01-01 RX ADMIN — Medication 40 MILLIEQUIVALENT(S): at 11:00

## 2024-01-01 RX ADMIN — CHLORHEXIDINE GLUCONATE 1 APPLICATION(S): 40 SOLUTION TOPICAL at 06:30

## 2024-01-01 RX ADMIN — MIDODRINE HYDROCHLORIDE 30 MILLIGRAM(S): 5 TABLET ORAL at 12:00

## 2024-01-01 RX ADMIN — Medication 1: at 09:08

## 2024-01-01 RX ADMIN — Medication 75 MILLIGRAM(S): at 11:07

## 2024-01-01 RX ADMIN — Medication 40 MILLIEQUIVALENT(S): at 17:38

## 2024-01-01 RX ADMIN — Medication 40 MILLIGRAM(S): at 17:05

## 2024-01-01 RX ADMIN — Medication 100 MILLIEQUIVALENT(S): at 23:27

## 2024-01-01 RX ADMIN — HEPARIN SODIUM 1800 UNIT(S)/HR: 1000 INJECTION INTRAVENOUS; SUBCUTANEOUS at 05:06

## 2024-01-01 RX ADMIN — Medication 50 MILLIEQUIVALENT(S): at 21:41

## 2024-01-01 RX ADMIN — PIPERACILLIN SODIUM AND TAZOBACTAM SODIUM 25 GRAM(S): 3; .375 INJECTION, POWDER, FOR SOLUTION INTRAVENOUS at 13:32

## 2024-01-01 RX ADMIN — SODIUM BICARBONATE 650 MILLIGRAM(S): 84 INJECTION, SOLUTION INTRAVENOUS at 22:00

## 2024-01-01 RX ADMIN — Medication 100 MILLIGRAM(S): at 17:59

## 2024-01-01 RX ADMIN — Medication 125 MILLIGRAM(S): at 05:37

## 2024-01-01 RX ADMIN — Medication 125 MILLIGRAM(S): at 17:06

## 2024-01-01 RX ADMIN — Medication 25 GRAM(S): at 18:53

## 2024-01-01 RX ADMIN — Medication 100 MILLIGRAM(S): at 11:31

## 2024-01-01 RX ADMIN — Medication 40 MILLIGRAM(S): at 05:11

## 2024-01-01 RX ADMIN — Medication 125 MILLIGRAM(S): at 17:20

## 2024-01-01 RX ADMIN — Medication 1 TABLET(S): at 13:48

## 2024-01-01 RX ADMIN — Medication 100 MILLIGRAM(S): at 11:07

## 2024-01-01 RX ADMIN — PROPOFOL 5.92 MICROGRAM(S)/KG/MIN: 10 INJECTION, EMULSION INTRAVENOUS at 19:22

## 2024-01-01 RX ADMIN — MIDODRINE HYDROCHLORIDE 20 MILLIGRAM(S): 5 TABLET ORAL at 17:58

## 2024-01-01 RX ADMIN — OCTREOTIDE ACETATE 250 MICROGRAM(S): 100 INJECTION, SOLUTION INTRAVENOUS; SUBCUTANEOUS at 14:25

## 2024-01-01 RX ADMIN — ACETAMINOPHEN 650 MILLIGRAM(S): 325 TABLET ORAL at 20:48

## 2024-01-01 RX ADMIN — MIDODRINE HYDROCHLORIDE 30 MILLIGRAM(S): 5 TABLET ORAL at 06:30

## 2024-01-01 RX ADMIN — Medication 2 MILLIGRAM(S): at 21:02

## 2024-01-01 RX ADMIN — Medication 125 MILLIGRAM(S): at 06:18

## 2024-01-01 RX ADMIN — SODIUM BICARBONATE 1950 MILLIGRAM(S): 84 INJECTION, SOLUTION INTRAVENOUS at 23:34

## 2024-01-01 RX ADMIN — Medication 40 MILLIGRAM(S): at 10:49

## 2024-01-01 RX ADMIN — GUAIFENESIN 300 MILLIGRAM(S): 100 LIQUID ORAL at 12:54

## 2024-01-01 RX ADMIN — TORSEMIDE 20 MILLIGRAM(S): 20 TABLET ORAL at 05:09

## 2024-01-01 RX ADMIN — MIDODRINE HYDROCHLORIDE 10 MILLIGRAM(S): 5 TABLET ORAL at 22:00

## 2024-01-01 RX ADMIN — Medication 100 MILLIGRAM(S): at 05:32

## 2024-01-01 RX ADMIN — Medication 100 MILLIGRAM(S): at 17:07

## 2024-01-01 RX ADMIN — Medication 1 MILLIGRAM(S): at 12:29

## 2024-01-01 RX ADMIN — HEPARIN SODIUM 1800 UNIT(S)/HR: 1000 INJECTION INTRAVENOUS; SUBCUTANEOUS at 22:53

## 2024-01-01 RX ADMIN — HYDROCORTISONE 50 MILLIGRAM(S): 10 TABLET ORAL at 22:36

## 2024-01-01 RX ADMIN — CEFEPIME 100 MILLIGRAM(S): 2 INJECTION, POWDER, FOR SOLUTION INTRAVENOUS at 14:25

## 2024-01-01 RX ADMIN — HEPARIN SODIUM 1800 UNIT(S)/HR: 1000 INJECTION INTRAVENOUS; SUBCUTANEOUS at 01:28

## 2024-01-01 RX ADMIN — CHLORHEXIDINE GLUCONATE 15 MILLILITER(S): 40 SOLUTION TOPICAL at 17:25

## 2024-01-01 RX ADMIN — Medication 75 MILLILITER(S): at 14:17

## 2024-01-01 RX ADMIN — Medication 2 MILLIGRAM(S): at 23:33

## 2024-01-01 RX ADMIN — MIDODRINE HYDROCHLORIDE 20 MILLIGRAM(S): 5 TABLET ORAL at 12:03

## 2024-01-01 RX ADMIN — DIGOXIN 125 MICROGRAM(S): 0.12 TABLET ORAL at 12:24

## 2024-01-01 RX ADMIN — EZETIMIBE 10 MILLIGRAM(S): 10 TABLET ORAL at 21:17

## 2024-01-01 RX ADMIN — DROXIDOPA 200 MILLIGRAM(S): 300 CAPSULE ORAL at 22:00

## 2024-01-01 RX ADMIN — OXYCODONE HYDROCHLORIDE 600 MILLIGRAM(S): 15 TABLET ORAL at 15:46

## 2024-01-01 RX ADMIN — OCTREOTIDE ACETATE 250 MICROGRAM(S): 100 INJECTION, SOLUTION INTRAVENOUS; SUBCUTANEOUS at 13:14

## 2024-01-01 RX ADMIN — SODIUM CHLORIDE 4 MILLILITER(S): 9 INJECTION INTRAMUSCULAR; INTRAVENOUS; SUBCUTANEOUS at 06:08

## 2024-01-01 RX ADMIN — Medication 40 MILLIGRAM(S): at 11:08

## 2024-01-01 RX ADMIN — Medication 1 PATCH: at 00:18

## 2024-01-01 RX ADMIN — PIPERACILLIN SODIUM AND TAZOBACTAM SODIUM 25 GRAM(S): 3; .375 INJECTION, POWDER, FOR SOLUTION INTRAVENOUS at 05:17

## 2024-01-01 RX ADMIN — Medication 100 MILLIGRAM(S): at 11:35

## 2024-01-01 RX ADMIN — Medication 100 MILLIGRAM(S): at 17:31

## 2024-01-01 RX ADMIN — Medication 100 GRAM(S): at 13:33

## 2024-01-01 RX ADMIN — Medication 40 MILLIGRAM(S): at 18:23

## 2024-01-01 RX ADMIN — SOTALOL HYDROCHLORIDE 40 MILLIGRAM(S): 120 TABLET ORAL at 05:53

## 2024-01-01 RX ADMIN — Medication 100 MILLIGRAM(S): at 12:03

## 2024-01-01 RX ADMIN — AMOXICILLIN AND CLAVULANATE POTASSIUM 1 TABLET(S): 500; 125 TABLET, FILM COATED ORAL at 17:30

## 2024-01-01 RX ADMIN — Medication 100 MILLIGRAM(S): at 17:58

## 2024-01-01 RX ADMIN — Medication 10 MILLIGRAM(S): at 20:40

## 2024-01-01 RX ADMIN — OXYCODONE HYDROCHLORIDE 400 MILLIGRAM(S): 15 TABLET ORAL at 13:32

## 2024-01-01 RX ADMIN — MIDODRINE HYDROCHLORIDE 10 MILLIGRAM(S): 5 TABLET ORAL at 21:53

## 2024-01-01 RX ADMIN — Medication 1 MILLIGRAM(S): at 11:31

## 2024-01-01 RX ADMIN — Medication 80 MILLIGRAM(S): at 21:12

## 2024-01-01 RX ADMIN — SODIUM BICARBONATE 650 MILLIGRAM(S): 84 INJECTION, SOLUTION INTRAVENOUS at 13:03

## 2024-01-01 RX ADMIN — Medication 0.6 MILLIGRAM(S): at 11:47

## 2024-01-01 RX ADMIN — EZETIMIBE 10 MILLIGRAM(S): 10 TABLET ORAL at 20:50

## 2024-01-01 RX ADMIN — PIPERACILLIN SODIUM AND TAZOBACTAM SODIUM 25 GRAM(S): 3; .375 INJECTION, POWDER, FOR SOLUTION INTRAVENOUS at 05:16

## 2024-01-01 RX ADMIN — VASOPRESSIN 6 UNIT(S)/MIN: 20 INJECTION INTRAVENOUS at 05:52

## 2024-01-01 RX ADMIN — OXYCODONE HYDROCHLORIDE 16.7 MG/MIN: 15 TABLET ORAL at 21:54

## 2024-01-01 RX ADMIN — METOPROLOL TARTRATE 12.5 MILLIGRAM(S): 100 TABLET ORAL at 05:36

## 2024-01-01 RX ADMIN — FOLIC ACID 1 MILLIGRAM(S): 1 TABLET ORAL at 11:31

## 2024-01-01 RX ADMIN — Medication 125 MILLIGRAM(S): at 23:55

## 2024-01-01 RX ADMIN — Medication 10 MG/HR: at 21:43

## 2024-01-01 RX ADMIN — OXYCODONE HYDROCHLORIDE 400 MILLIGRAM(S): 15 TABLET ORAL at 05:53

## 2024-01-01 RX ADMIN — DEXMEDETOMIDINE HYDROCHLORIDE IN 0.9% SODIUM CHLORIDE 2.47 MICROGRAM(S)/KG/HR: 4 INJECTION INTRAVENOUS at 12:11

## 2024-01-01 RX ADMIN — SODIUM CHLORIDE 50 MILLILITER(S): 9 INJECTION INTRAMUSCULAR; INTRAVENOUS; SUBCUTANEOUS at 11:04

## 2024-01-01 RX ADMIN — METOPROLOL TARTRATE 12.5 MILLIGRAM(S): 100 TABLET ORAL at 18:18

## 2024-01-01 RX ADMIN — Medication 100 MILLIEQUIVALENT(S): at 10:42

## 2024-01-01 RX ADMIN — FENTANYL CITRATE 50 MICROGRAM(S): 50 INJECTION INTRAMUSCULAR; INTRAVENOUS at 17:46

## 2024-01-01 RX ADMIN — Medication 40 MILLIGRAM(S): at 18:45

## 2024-01-01 RX ADMIN — FENTANYL CITRATE 100 MICROGRAM(S): 50 INJECTION INTRAMUSCULAR; INTRAVENOUS at 18:30

## 2024-01-01 RX ADMIN — MIDODRINE HYDROCHLORIDE 20 MILLIGRAM(S): 5 TABLET ORAL at 12:06

## 2024-01-01 RX ADMIN — DROXIDOPA 200 MILLIGRAM(S): 300 CAPSULE ORAL at 06:56

## 2024-01-01 RX ADMIN — Medication 100 MILLIEQUIVALENT(S): at 20:23

## 2024-01-01 RX ADMIN — PHENYLEPHRINE HYDROCHLORIDE 1.85 MICROGRAM(S)/KG/MIN: 10 INJECTION INTRAVENOUS at 10:00

## 2024-01-01 RX ADMIN — METOPROLOL TARTRATE 12.5 MILLIGRAM(S): 100 TABLET ORAL at 05:06

## 2024-01-01 RX ADMIN — Medication 40 MILLIGRAM(S): at 05:47

## 2024-01-01 RX ADMIN — FOLIC ACID 1 MILLIGRAM(S): 1 TABLET ORAL at 11:30

## 2024-01-01 RX ADMIN — HEPARIN SODIUM 1800 UNIT(S)/HR: 1000 INJECTION INTRAVENOUS; SUBCUTANEOUS at 07:04

## 2024-01-01 RX ADMIN — Medication 40 MILLIGRAM(S): at 23:40

## 2024-01-01 RX ADMIN — Medication 80 MILLIGRAM(S): at 21:07

## 2024-01-01 RX ADMIN — METOPROLOL TARTRATE 25 MILLIGRAM(S): 100 TABLET ORAL at 17:15

## 2024-01-01 RX ADMIN — METOPROLOL TARTRATE 12.5 MILLIGRAM(S): 100 TABLET ORAL at 17:34

## 2024-01-01 RX ADMIN — Medication 40 MILLIEQUIVALENT(S): at 11:08

## 2024-01-01 RX ADMIN — PHENYLEPHRINE HYDROCHLORIDE 9.25 MICROGRAM(S)/KG/MIN: 10 INJECTION INTRAVENOUS at 20:20

## 2024-01-01 RX ADMIN — Medication 100 GRAM(S): at 12:24

## 2024-01-01 RX ADMIN — FENTANYL CITRATE 50 MICROGRAM(S): 50 INJECTION INTRAMUSCULAR; INTRAVENOUS at 05:00

## 2024-01-01 RX ADMIN — Medication 2 MILLIGRAM(S): at 21:30

## 2024-01-01 RX ADMIN — Medication 500 MILLIGRAM(S): at 12:06

## 2024-01-01 RX ADMIN — DROXIDOPA 100 MILLIGRAM(S): 300 CAPSULE ORAL at 21:07

## 2024-01-01 RX ADMIN — OCTREOTIDE ACETATE 250 MICROGRAM(S): 100 INJECTION, SOLUTION INTRAVENOUS; SUBCUTANEOUS at 06:17

## 2024-01-01 RX ADMIN — Medication 1000 MILLILITER(S): at 16:44

## 2024-01-01 RX ADMIN — Medication 75 MILLILITER(S): at 09:51

## 2024-01-01 RX ADMIN — CHLORHEXIDINE GLUCONATE 1 APPLICATION(S): 40 SOLUTION TOPICAL at 05:35

## 2024-01-01 RX ADMIN — MIDODRINE HYDROCHLORIDE 15 MILLIGRAM(S): 5 TABLET ORAL at 11:21

## 2024-01-01 RX ADMIN — Medication 80 MILLIGRAM(S): at 21:37

## 2024-01-01 RX ADMIN — CHLORHEXIDINE GLUCONATE 1 APPLICATION(S): 40 SOLUTION TOPICAL at 05:15

## 2024-01-01 RX ADMIN — VASOPRESSIN 6 UNIT(S)/MIN: 20 INJECTION INTRAVENOUS at 19:50

## 2024-01-01 RX ADMIN — MIDODRINE HYDROCHLORIDE 20 MILLIGRAM(S): 5 TABLET ORAL at 12:19

## 2024-01-01 RX ADMIN — CHLORHEXIDINE GLUCONATE 1 APPLICATION(S): 40 SOLUTION TOPICAL at 05:02

## 2024-01-01 RX ADMIN — SODIUM BICARBONATE 650 MILLIGRAM(S): 84 INJECTION, SOLUTION INTRAVENOUS at 14:40

## 2024-01-01 RX ADMIN — OCTREOTIDE ACETATE 250 MICROGRAM(S): 100 INJECTION, SOLUTION INTRAVENOUS; SUBCUTANEOUS at 22:15

## 2024-01-01 RX ADMIN — PIPERACILLIN SODIUM AND TAZOBACTAM SODIUM 25 GRAM(S): 3; .375 INJECTION, POWDER, FOR SOLUTION INTRAVENOUS at 17:21

## 2024-01-01 RX ADMIN — Medication 100 MILLIGRAM(S): at 12:20

## 2024-01-01 RX ADMIN — Medication 125 MILLIGRAM(S): at 12:29

## 2024-01-01 RX ADMIN — FENTANYL CITRATE 50 MICROGRAM(S): 50 INJECTION INTRAMUSCULAR; INTRAVENOUS at 21:50

## 2024-01-01 RX ADMIN — OCTREOTIDE ACETATE 250 MICROGRAM(S): 100 INJECTION, SOLUTION INTRAVENOUS; SUBCUTANEOUS at 17:04

## 2024-01-01 RX ADMIN — CHLORHEXIDINE GLUCONATE 1 APPLICATION(S): 40 SOLUTION TOPICAL at 05:50

## 2024-01-01 RX ADMIN — Medication 80 MILLIGRAM(S): at 21:30

## 2024-01-01 RX ADMIN — Medication 40 MILLIGRAM(S): at 17:43

## 2024-01-01 RX ADMIN — PIPERACILLIN SODIUM AND TAZOBACTAM SODIUM 25 GRAM(S): 3; .375 INJECTION, POWDER, FOR SOLUTION INTRAVENOUS at 05:37

## 2024-01-01 RX ADMIN — Medication 10 MG/HR: at 15:53

## 2024-01-01 RX ADMIN — Medication 100 MILLIEQUIVALENT(S): at 23:30

## 2024-01-01 RX ADMIN — GUAIFENESIN 300 MILLIGRAM(S): 100 LIQUID ORAL at 18:17

## 2024-01-01 RX ADMIN — MIDODRINE HYDROCHLORIDE 30 MILLIGRAM(S): 5 TABLET ORAL at 15:49

## 2024-01-01 RX ADMIN — Medication 40 MILLIEQUIVALENT(S): at 01:34

## 2024-01-01 RX ADMIN — PIPERACILLIN SODIUM AND TAZOBACTAM SODIUM 25 GRAM(S): 3; .375 INJECTION, POWDER, FOR SOLUTION INTRAVENOUS at 21:59

## 2024-01-01 RX ADMIN — SODIUM BICARBONATE 1950 MILLIGRAM(S): 84 INJECTION, SOLUTION INTRAVENOUS at 14:31

## 2024-01-01 RX ADMIN — Medication 100 MILLIEQUIVALENT(S): at 16:16

## 2024-01-01 RX ADMIN — Medication 1: at 12:19

## 2024-01-01 RX ADMIN — Medication 100 MILLIEQUIVALENT(S): at 03:34

## 2024-01-01 RX ADMIN — Medication 40 MILLIGRAM(S): at 17:07

## 2024-01-01 RX ADMIN — Medication 100 MILLIEQUIVALENT(S): at 03:23

## 2024-01-01 RX ADMIN — DIGOXIN 125 MICROGRAM(S): 0.12 TABLET ORAL at 11:14

## 2024-01-01 RX ADMIN — ACETAMINOPHEN 1000 MILLIGRAM(S): 325 TABLET ORAL at 20:54

## 2024-01-01 RX ADMIN — HEPARIN SODIUM 1800 UNIT(S)/HR: 1000 INJECTION INTRAVENOUS; SUBCUTANEOUS at 01:35

## 2024-01-01 RX ADMIN — MIDODRINE HYDROCHLORIDE 30 MILLIGRAM(S): 5 TABLET ORAL at 13:37

## 2024-01-01 RX ADMIN — MIDODRINE HYDROCHLORIDE 10 MILLIGRAM(S): 5 TABLET ORAL at 22:45

## 2024-01-01 RX ADMIN — APIXABAN 5 MILLIGRAM(S): 5 TABLET, FILM COATED ORAL at 17:44

## 2024-01-01 RX ADMIN — Medication 1 TABLET(S): at 11:46

## 2024-01-01 RX ADMIN — VASOPRESSIN 6 UNIT(S)/MIN: 20 INJECTION INTRAVENOUS at 10:40

## 2024-01-01 RX ADMIN — MIDODRINE HYDROCHLORIDE 15 MILLIGRAM(S): 5 TABLET ORAL at 17:33

## 2024-01-01 RX ADMIN — ALBUMIN (HUMAN) 50 MILLILITER(S): 5 SOLUTION INTRAVENOUS at 13:25

## 2024-01-01 RX ADMIN — CLOPIDOGREL BISULFATE 75 MILLIGRAM(S): 75 TABLET, FILM COATED ORAL at 10:56

## 2024-01-01 RX ADMIN — Medication 40 MILLIGRAM(S): at 06:59

## 2024-01-01 RX ADMIN — METOPROLOL TARTRATE 12.5 MILLIGRAM(S): 100 TABLET ORAL at 18:53

## 2024-01-01 RX ADMIN — OCTREOTIDE ACETATE 250 MICROGRAM(S): 100 INJECTION, SOLUTION INTRAVENOUS; SUBCUTANEOUS at 22:10

## 2024-01-01 RX ADMIN — SODIUM BICARBONATE 650 MILLIGRAM(S): 84 INJECTION, SOLUTION INTRAVENOUS at 21:51

## 2024-01-01 RX ADMIN — OCTREOTIDE ACETATE 250 MICROGRAM(S): 100 INJECTION, SOLUTION INTRAVENOUS; SUBCUTANEOUS at 05:32

## 2024-01-01 RX ADMIN — Medication 25 GRAM(S): at 08:48

## 2024-01-01 RX ADMIN — APIXABAN 5 MILLIGRAM(S): 2.5 TABLET, FILM COATED ORAL at 05:18

## 2024-01-01 RX ADMIN — Medication 100 MILLIGRAM(S): at 13:10

## 2024-01-01 RX ADMIN — Medication 80 MILLIGRAM(S): at 21:54

## 2024-01-01 RX ADMIN — Medication 1 MILLIGRAM(S): at 14:11

## 2024-01-01 RX ADMIN — PHENYLEPHRINE HYDROCHLORIDE 1.85 MICROGRAM(S)/KG/MIN: 10 INJECTION INTRAVENOUS at 05:58

## 2024-01-01 RX ADMIN — Medication 1: at 17:29

## 2024-01-01 RX ADMIN — IPRATROPIUM BROMIDE AND ALBUTEROL SULFATE 3 MILLILITER(S): .5; 3 SOLUTION RESPIRATORY (INHALATION) at 18:14

## 2024-01-01 RX ADMIN — Medication 100 MILLIEQUIVALENT(S): at 15:53

## 2024-01-01 RX ADMIN — Medication 100 MILLIGRAM(S): at 05:51

## 2024-01-01 RX ADMIN — CHLORHEXIDINE GLUCONATE 1 APPLICATION(S): 40 SOLUTION TOPICAL at 07:34

## 2024-01-01 RX ADMIN — MIDODRINE HYDROCHLORIDE 30 MILLIGRAM(S): 5 TABLET ORAL at 05:57

## 2024-01-01 RX ADMIN — Medication 40 MILLIGRAM(S): at 05:14

## 2024-01-01 RX ADMIN — Medication 1 MILLIGRAM(S): at 13:49

## 2024-01-01 RX ADMIN — Medication 40 MILLIEQUIVALENT(S): at 04:06

## 2024-01-01 RX ADMIN — LIDOCAINE HYDROCHLORIDE 1 PATCH: 20 JELLY TOPICAL at 17:13

## 2024-01-01 RX ADMIN — ACETAMINOPHEN 1000 MILLIGRAM(S): 325 TABLET ORAL at 06:30

## 2024-01-01 RX ADMIN — Medication 40 MILLIGRAM(S): at 05:52

## 2024-01-01 RX ADMIN — APIXABAN 5 MILLIGRAM(S): 2.5 TABLET, FILM COATED ORAL at 17:11

## 2024-01-01 RX ADMIN — CLOPIDOGREL BISULFATE 75 MILLIGRAM(S): 75 TABLET, FILM COATED ORAL at 11:27

## 2024-01-01 RX ADMIN — MIDODRINE HYDROCHLORIDE 10 MILLIGRAM(S): 5 TABLET ORAL at 05:09

## 2024-01-01 RX ADMIN — Medication 40 MILLIGRAM(S): at 21:36

## 2024-01-01 RX ADMIN — METOPROLOL TARTRATE 5 MILLIGRAM(S): 100 TABLET ORAL at 17:46

## 2024-01-01 RX ADMIN — Medication 75 MILLIGRAM(S): at 12:05

## 2024-01-01 RX ADMIN — Medication 125 MILLIGRAM(S): at 00:11

## 2024-01-01 RX ADMIN — Medication 125 MILLIGRAM(S): at 12:24

## 2024-01-01 RX ADMIN — GUAIFENESIN 300 MILLIGRAM(S): 100 LIQUID ORAL at 13:09

## 2024-01-01 RX ADMIN — HYDROCORTISONE 50 MILLIGRAM(S): 10 TABLET ORAL at 17:05

## 2024-01-01 RX ADMIN — Medication 1: at 17:31

## 2024-01-01 RX ADMIN — FENTANYL CITRATE 50 MICROGRAM(S): 50 INJECTION INTRAMUSCULAR; INTRAVENOUS at 18:00

## 2024-01-01 RX ADMIN — Medication 100 MILLIEQUIVALENT(S): at 02:14

## 2024-01-01 RX ADMIN — CEFEPIME 100 MILLIGRAM(S): 2 INJECTION, POWDER, FOR SOLUTION INTRAVENOUS at 17:45

## 2024-01-01 RX ADMIN — Medication 500 MILLIGRAM(S): at 10:56

## 2024-01-01 RX ADMIN — GUAIFENESIN 300 MILLIGRAM(S): 100 LIQUID ORAL at 05:02

## 2024-01-01 RX ADMIN — Medication 40 MILLIGRAM(S): at 17:20

## 2024-01-01 RX ADMIN — Medication 1 APPLICATION(S): at 11:30

## 2024-01-01 RX ADMIN — OCTREOTIDE ACETATE 250 MICROGRAM(S): 100 INJECTION, SOLUTION INTRAVENOUS; SUBCUTANEOUS at 06:22

## 2024-01-01 RX ADMIN — APIXABAN 5 MILLIGRAM(S): 5 TABLET, FILM COATED ORAL at 17:04

## 2024-01-01 RX ADMIN — ACETAMINOPHEN 400 MILLIGRAM(S): 325 TABLET ORAL at 18:16

## 2024-01-01 RX ADMIN — Medication 1: at 12:13

## 2024-01-01 RX ADMIN — GUAIFENESIN 300 MILLIGRAM(S): 100 LIQUID ORAL at 17:39

## 2024-01-01 RX ADMIN — OCTREOTIDE ACETATE 250 MICROGRAM(S): 100 INJECTION, SOLUTION INTRAVENOUS; SUBCUTANEOUS at 05:31

## 2024-01-01 RX ADMIN — Medication 1: at 00:55

## 2024-01-01 RX ADMIN — MIDODRINE HYDROCHLORIDE 30 MILLIGRAM(S): 5 TABLET ORAL at 22:00

## 2024-01-01 RX ADMIN — DIGOXIN 125 MICROGRAM(S): 0.12 TABLET ORAL at 15:29

## 2024-01-01 RX ADMIN — HYDROCORTISONE 50 MILLIGRAM(S): 10 TABLET ORAL at 18:31

## 2024-01-01 RX ADMIN — ALBUMIN (HUMAN) 50 MILLILITER(S): 5 SOLUTION INTRAVENOUS at 22:20

## 2024-01-01 RX ADMIN — Medication 1: at 05:01

## 2024-01-01 RX ADMIN — MIDODRINE HYDROCHLORIDE 10 MILLIGRAM(S): 5 TABLET ORAL at 15:29

## 2024-01-01 RX ADMIN — ACETAMINOPHEN 400 MILLIGRAM(S): 325 TABLET ORAL at 21:07

## 2024-01-01 RX ADMIN — DIGOXIN 125 MICROGRAM(S): 0.12 TABLET ORAL at 12:54

## 2024-01-01 RX ADMIN — Medication 1 MILLIGRAM(S): at 12:18

## 2024-01-01 RX ADMIN — Medication 40 MILLIGRAM(S): at 10:05

## 2024-01-01 RX ADMIN — MIDODRINE HYDROCHLORIDE 10 MILLIGRAM(S): 5 TABLET ORAL at 05:31

## 2024-01-01 RX ADMIN — PROPOFOL 11.8 MICROGRAM(S)/KG/MIN: 10 INJECTION, EMULSION INTRAVENOUS at 22:36

## 2024-01-01 RX ADMIN — ONDANSETRON 4 MILLIGRAM(S): 2 INJECTION, SOLUTION INTRAMUSCULAR; INTRAVENOUS at 22:02

## 2024-01-01 RX ADMIN — HYDROCORTISONE 50 MILLIGRAM(S): 10 TABLET ORAL at 17:19

## 2024-01-01 RX ADMIN — Medication 100 MILLIGRAM(S): at 11:15

## 2024-01-01 RX ADMIN — METOPROLOL TARTRATE 12.5 MILLIGRAM(S): 100 TABLET ORAL at 17:49

## 2024-01-01 RX ADMIN — CEFEPIME 100 MILLIGRAM(S): 2 INJECTION, POWDER, FOR SOLUTION INTRAVENOUS at 14:43

## 2024-01-01 RX ADMIN — HYDROCORTISONE 25 MILLIGRAM(S): 10 TABLET ORAL at 07:01

## 2024-01-01 RX ADMIN — FOLIC ACID 1 MILLIGRAM(S): 1 TABLET ORAL at 10:57

## 2024-01-01 RX ADMIN — Medication 1: at 17:13

## 2024-01-01 RX ADMIN — Medication 1 MILLIGRAM(S): at 14:49

## 2024-01-01 RX ADMIN — OXYCODONE HYDROCHLORIDE 200 MILLIGRAM(S): 15 TABLET ORAL at 05:36

## 2024-01-01 RX ADMIN — Medication 100 MILLIGRAM(S): at 05:12

## 2024-01-01 RX ADMIN — MIDODRINE HYDROCHLORIDE 15 MILLIGRAM(S): 5 TABLET ORAL at 17:09

## 2024-01-01 RX ADMIN — HYDROCORTISONE 50 MILLIGRAM(S): 10 TABLET ORAL at 17:31

## 2024-01-01 RX ADMIN — MIDODRINE HYDROCHLORIDE 10 MILLIGRAM(S): 5 TABLET ORAL at 21:22

## 2024-01-01 RX ADMIN — MIDODRINE HYDROCHLORIDE 15 MILLIGRAM(S): 5 TABLET ORAL at 05:08

## 2024-01-01 RX ADMIN — FOLIC ACID 1 MILLIGRAM(S): 1 TABLET ORAL at 11:49

## 2024-01-01 RX ADMIN — SODIUM CHLORIDE 4 MILLILITER(S): 9 INJECTION INTRAMUSCULAR; INTRAVENOUS; SUBCUTANEOUS at 18:55

## 2024-01-01 RX ADMIN — IPRATROPIUM BROMIDE AND ALBUTEROL SULFATE 3 MILLILITER(S): .5; 3 SOLUTION RESPIRATORY (INHALATION) at 12:55

## 2024-01-01 RX ADMIN — Medication 1: at 06:14

## 2024-01-01 RX ADMIN — OCTREOTIDE ACETATE 250 MICROGRAM(S): 100 INJECTION, SOLUTION INTRAVENOUS; SUBCUTANEOUS at 21:23

## 2024-01-01 RX ADMIN — Medication 25 GRAM(S): at 22:26

## 2024-01-01 RX ADMIN — OCTREOTIDE ACETATE 250 MICROGRAM(S): 100 INJECTION, SOLUTION INTRAVENOUS; SUBCUTANEOUS at 15:00

## 2024-01-01 RX ADMIN — HYDROCORTISONE 50 MILLIGRAM(S): 10 TABLET ORAL at 05:00

## 2024-01-01 RX ADMIN — Medication 100 MILLIGRAM(S): at 05:08

## 2024-01-01 RX ADMIN — OCTREOTIDE ACETATE 250 MICROGRAM(S): 100 INJECTION, SOLUTION INTRAVENOUS; SUBCUTANEOUS at 14:30

## 2024-01-01 RX ADMIN — SODIUM CHLORIDE 4 MILLILITER(S): 9 INJECTION INTRAMUSCULAR; INTRAVENOUS; SUBCUTANEOUS at 18:58

## 2024-01-01 RX ADMIN — APIXABAN 5 MILLIGRAM(S): 2.5 TABLET, FILM COATED ORAL at 17:12

## 2024-01-01 RX ADMIN — Medication 40 MILLIGRAM(S): at 00:04

## 2024-01-01 RX ADMIN — Medication 40 MILLIGRAM(S): at 05:32

## 2024-01-01 RX ADMIN — Medication 125 MILLIGRAM(S): at 12:12

## 2024-01-01 RX ADMIN — Medication 1 APPLICATION(S): at 06:31

## 2024-01-01 RX ADMIN — ALBUMIN (HUMAN) 250 MILLILITER(S): 5 SOLUTION INTRAVENOUS at 11:45

## 2024-01-01 RX ADMIN — OCTREOTIDE ACETATE 250 MICROGRAM(S): 100 INJECTION, SOLUTION INTRAVENOUS; SUBCUTANEOUS at 14:14

## 2024-01-01 RX ADMIN — Medication 40 MILLIGRAM(S): at 05:22

## 2024-01-01 RX ADMIN — Medication 100 MILLIEQUIVALENT(S): at 02:25

## 2024-01-01 RX ADMIN — GUAIFENESIN 1200 MILLIGRAM(S): 100 LIQUID ORAL at 21:59

## 2024-01-01 RX ADMIN — PROPOFOL 11.8 MICROGRAM(S)/KG/MIN: 10 INJECTION, EMULSION INTRAVENOUS at 10:30

## 2024-01-01 RX ADMIN — Medication 100 MILLIEQUIVALENT(S): at 22:26

## 2024-01-01 RX ADMIN — SODIUM CHLORIDE 4 MILLILITER(S): 9 INJECTION INTRAMUSCULAR; INTRAVENOUS; SUBCUTANEOUS at 17:49

## 2024-01-01 RX ADMIN — Medication 1: at 12:03

## 2024-01-01 RX ADMIN — PROPOFOL 5.92 MICROGRAM(S)/KG/MIN: 10 INJECTION, EMULSION INTRAVENOUS at 05:32

## 2024-01-01 RX ADMIN — CHLORHEXIDINE GLUCONATE 1 APPLICATION(S): 40 SOLUTION TOPICAL at 06:03

## 2024-01-01 RX ADMIN — PHENYLEPHRINE HYDROCHLORIDE 9.25 MICROGRAM(S)/KG/MIN: 10 INJECTION INTRAVENOUS at 10:05

## 2024-01-01 RX ADMIN — Medication 20 MILLIEQUIVALENT(S): at 12:42

## 2024-01-01 RX ADMIN — Medication 125 MILLIGRAM(S): at 11:09

## 2024-01-01 RX ADMIN — POTASSIUM PHOSPHATE 83.33 MILLIMOLE(S): 236; 224 INJECTION, SOLUTION INTRAVENOUS at 17:28

## 2024-01-01 RX ADMIN — POTASSIUM PHOSPHATE 83.33 MILLIMOLE(S): 236; 224 INJECTION, SOLUTION INTRAVENOUS at 18:17

## 2024-01-01 RX ADMIN — CEFEPIME 100 MILLIGRAM(S): 2 INJECTION, POWDER, FOR SOLUTION INTRAVENOUS at 21:34

## 2024-01-01 RX ADMIN — CEFEPIME 100 MILLIGRAM(S): 2 INJECTION, POWDER, FOR SOLUTION INTRAVENOUS at 13:28

## 2024-01-01 RX ADMIN — OXYCODONE HYDROCHLORIDE 400 MILLIGRAM(S): 15 TABLET ORAL at 13:37

## 2024-01-01 RX ADMIN — METOPROLOL TARTRATE 25 MILLIGRAM(S): 100 TABLET ORAL at 18:42

## 2024-01-01 RX ADMIN — Medication 25 GRAM(S): at 01:56

## 2024-01-01 RX ADMIN — Medication 1 TABLET(S): at 11:29

## 2024-01-01 RX ADMIN — Medication 1: at 12:18

## 2024-01-01 RX ADMIN — Medication 500 MILLIGRAM(S): at 11:21

## 2024-01-01 RX ADMIN — PETROLATUM 1 APPLICATION(S): 865 OINTMENT TOPICAL at 05:26

## 2024-01-01 RX ADMIN — SODIUM BICARBONATE 1300 MILLIGRAM(S): 84 INJECTION, SOLUTION INTRAVENOUS at 21:36

## 2024-01-01 RX ADMIN — MIDODRINE HYDROCHLORIDE 15 MILLIGRAM(S): 5 TABLET ORAL at 22:43

## 2024-01-01 RX ADMIN — CHLORHEXIDINE GLUCONATE 1 APPLICATION(S): 40 SOLUTION TOPICAL at 05:26

## 2024-01-01 RX ADMIN — Medication 1 PATCH: at 19:01

## 2024-01-01 RX ADMIN — MIDODRINE HYDROCHLORIDE 10 MILLIGRAM(S): 5 TABLET ORAL at 21:11

## 2024-01-01 RX ADMIN — MIDODRINE HYDROCHLORIDE 15 MILLIGRAM(S): 5 TABLET ORAL at 17:48

## 2024-01-01 RX ADMIN — GUAIFENESIN 200 MILLIGRAM(S): 100 LIQUID ORAL at 23:52

## 2024-01-01 RX ADMIN — FENTANYL CITRATE 25 MICROGRAM(S): 50 INJECTION INTRAMUSCULAR; INTRAVENOUS at 23:19

## 2024-01-01 RX ADMIN — DEXMEDETOMIDINE HYDROCHLORIDE IN 0.9% SODIUM CHLORIDE 12.3 MICROGRAM(S)/KG/HR: 4 INJECTION INTRAVENOUS at 19:30

## 2024-01-01 RX ADMIN — Medication 1 MILLIGRAM(S): at 12:09

## 2024-01-01 RX ADMIN — MIDODRINE HYDROCHLORIDE 15 MILLIGRAM(S): 5 TABLET ORAL at 05:29

## 2024-01-01 RX ADMIN — Medication 2: at 06:16

## 2024-01-01 RX ADMIN — Medication 9.25 MICROGRAM(S)/KG/MIN: at 16:56

## 2024-01-01 RX ADMIN — SODIUM CHLORIDE 4 MILLILITER(S): 9 INJECTION INTRAMUSCULAR; INTRAVENOUS; SUBCUTANEOUS at 05:44

## 2024-01-01 RX ADMIN — Medication 25 GRAM(S): at 02:44

## 2024-01-01 RX ADMIN — Medication 75 MILLIGRAM(S): at 11:29

## 2024-01-01 RX ADMIN — HYDROCORTISONE 50 MILLIGRAM(S): 10 TABLET ORAL at 23:52

## 2024-01-01 RX ADMIN — EZETIMIBE 10 MILLIGRAM(S): 10 TABLET ORAL at 21:26

## 2024-01-01 RX ADMIN — Medication 10 MG/HR: at 15:50

## 2024-01-01 RX ADMIN — Medication 10 MG/HR: at 05:58

## 2024-01-01 RX ADMIN — Medication 40 MILLIEQUIVALENT(S): at 08:48

## 2024-01-01 RX ADMIN — Medication 500 MILLIGRAM(S): at 11:28

## 2024-01-01 RX ADMIN — MIDODRINE HYDROCHLORIDE 30 MILLIGRAM(S): 5 TABLET ORAL at 13:22

## 2024-01-01 RX ADMIN — SODIUM CHLORIDE 75 MILLILITER(S): 9 INJECTION INTRAMUSCULAR; INTRAVENOUS; SUBCUTANEOUS at 01:08

## 2024-01-01 RX ADMIN — OXYCODONE HYDROCHLORIDE 600 MILLIGRAM(S): 15 TABLET ORAL at 03:49

## 2024-01-01 RX ADMIN — DROXIDOPA 200 MILLIGRAM(S): 300 CAPSULE ORAL at 06:30

## 2024-01-01 RX ADMIN — BUMETANIDE 2 MILLIGRAM(S): 2 TABLET ORAL at 06:33

## 2024-01-01 RX ADMIN — PHENYLEPHRINE HYDROCHLORIDE 47.1 MICROGRAM(S)/KG/MIN: 10 INJECTION INTRAVENOUS at 05:41

## 2024-01-01 RX ADMIN — ATORVASTATIN CALCIUM 80 MILLIGRAM(S): 80 TABLET, FILM COATED ORAL at 21:24

## 2024-01-01 RX ADMIN — OCTREOTIDE ACETATE 250 MICROGRAM(S): 100 INJECTION, SOLUTION INTRAVENOUS; SUBCUTANEOUS at 22:03

## 2024-01-01 RX ADMIN — Medication 100 MILLIGRAM(S): at 12:06

## 2024-01-01 RX ADMIN — Medication 100 MILLIEQUIVALENT(S): at 20:21

## 2024-01-01 RX ADMIN — PROPOFOL 11.8 MICROGRAM(S)/KG/MIN: 10 INJECTION, EMULSION INTRAVENOUS at 05:52

## 2024-01-01 RX ADMIN — ACETAMINOPHEN 1000 MILLIGRAM(S): 325 TABLET ORAL at 06:58

## 2024-01-01 RX ADMIN — FOLIC ACID 1 MILLIGRAM(S): 1 TABLET ORAL at 11:27

## 2024-01-01 RX ADMIN — OXYCODONE HYDROCHLORIDE 200 MILLIGRAM(S): 15 TABLET ORAL at 05:37

## 2024-01-01 RX ADMIN — DIGOXIN 125 MICROGRAM(S): 0.12 TABLET ORAL at 06:08

## 2024-01-01 RX ADMIN — METOPROLOL TARTRATE 25 MILLIGRAM(S): 100 TABLET ORAL at 05:09

## 2024-01-01 RX ADMIN — Medication 80 MILLIGRAM(S): at 21:46

## 2024-01-01 RX ADMIN — Medication 100 MILLIEQUIVALENT(S): at 04:02

## 2024-01-01 RX ADMIN — Medication 40 MILLIGRAM(S): at 05:33

## 2024-01-01 RX ADMIN — METOPROLOL TARTRATE 12.5 MILLIGRAM(S): 100 TABLET ORAL at 17:40

## 2024-01-01 RX ADMIN — MEROPENEM 100 MILLIGRAM(S): 500 INJECTION, POWDER, FOR SOLUTION INTRAVENOUS at 05:03

## 2024-01-01 RX ADMIN — Medication 40 MILLIGRAM(S): at 18:30

## 2024-01-01 RX ADMIN — Medication 250 MILLILITER(S): at 08:46

## 2024-01-01 RX ADMIN — Medication 40 MILLIGRAM(S): at 17:48

## 2024-01-01 RX ADMIN — Medication 100 GRAM(S): at 09:59

## 2024-01-01 RX ADMIN — FOLIC ACID 1 MILLIGRAM(S): 1 TABLET ORAL at 11:22

## 2024-01-01 RX ADMIN — HEPARIN SODIUM 2000 UNIT(S)/HR: 1000 INJECTION INTRAVENOUS; SUBCUTANEOUS at 05:46

## 2024-01-01 RX ADMIN — APIXABAN 5 MILLIGRAM(S): 5 TABLET, FILM COATED ORAL at 17:33

## 2024-01-01 RX ADMIN — HYDROCORTISONE 50 MILLIGRAM(S): 10 TABLET ORAL at 23:34

## 2024-01-01 RX ADMIN — Medication 25 GRAM(S): at 06:40

## 2024-01-01 RX ADMIN — CEFEPIME 100 MILLIGRAM(S): 2 INJECTION, POWDER, FOR SOLUTION INTRAVENOUS at 05:10

## 2024-01-01 RX ADMIN — PHENYLEPHRINE HYDROCHLORIDE 47.1 MICROGRAM(S)/KG/MIN: 10 INJECTION INTRAVENOUS at 09:50

## 2024-01-01 RX ADMIN — Medication 500 MILLILITER(S): at 14:18

## 2024-01-01 RX ADMIN — CANGRELOR 22.2 MICROGRAM(S)/KG/MIN: 50 INJECTION, POWDER, LYOPHILIZED, FOR SOLUTION INTRAVENOUS at 19:31

## 2024-01-01 RX ADMIN — Medication 40 MILLIEQUIVALENT(S): at 18:32

## 2024-01-01 RX ADMIN — MIDODRINE HYDROCHLORIDE 15 MILLIGRAM(S): 5 TABLET ORAL at 12:25

## 2024-01-01 RX ADMIN — DEXMEDETOMIDINE HYDROCHLORIDE IN 0.9% SODIUM CHLORIDE 2.47 MICROGRAM(S)/KG/HR: 4 INJECTION INTRAVENOUS at 12:28

## 2024-01-01 RX ADMIN — MIDODRINE HYDROCHLORIDE 30 MILLIGRAM(S): 5 TABLET ORAL at 13:03

## 2024-01-01 RX ADMIN — CEFEPIME 100 MILLIGRAM(S): 2 INJECTION, POWDER, FOR SOLUTION INTRAVENOUS at 22:03

## 2024-01-01 RX ADMIN — VASOPRESSIN 6 UNIT(S)/MIN: 20 INJECTION INTRAVENOUS at 12:28

## 2024-01-01 RX ADMIN — MIDODRINE HYDROCHLORIDE 30 MILLIGRAM(S): 5 TABLET ORAL at 23:34

## 2024-01-01 RX ADMIN — Medication 1: at 17:50

## 2024-01-01 RX ADMIN — MIDODRINE HYDROCHLORIDE 30 MILLIGRAM(S): 5 TABLET ORAL at 21:31

## 2024-01-01 RX ADMIN — MIDODRINE HYDROCHLORIDE 10 MILLIGRAM(S): 5 TABLET ORAL at 12:57

## 2024-01-01 RX ADMIN — Medication 40 MILLIEQUIVALENT(S): at 05:10

## 2024-01-01 RX ADMIN — Medication 25 GRAM(S): at 02:00

## 2024-01-01 RX ADMIN — ACETAMINOPHEN 650 MILLIGRAM(S): 325 TABLET ORAL at 22:31

## 2024-01-01 RX ADMIN — Medication 1 TABLET(S): at 11:21

## 2024-01-01 RX ADMIN — Medication 50 GRAM(S): at 14:39

## 2024-01-01 RX ADMIN — Medication 125 MILLIGRAM(S): at 17:39

## 2024-01-01 RX ADMIN — OXYMETAZOLINE HYDROCHLORIDE 1 SPRAY(S): 0.05 SPRAY, METERED NASAL at 13:10

## 2024-01-01 RX ADMIN — Medication 100 MILLIEQUIVALENT(S): at 03:27

## 2024-01-01 RX ADMIN — VASOPRESSIN 6 UNIT(S)/MIN: 20 INJECTION INTRAVENOUS at 00:59

## 2024-01-01 RX ADMIN — Medication 40 MILLIGRAM(S): at 05:20

## 2024-01-01 RX ADMIN — Medication 125 MILLIGRAM(S): at 05:16

## 2024-01-01 RX ADMIN — Medication 75 MILLILITER(S): at 13:00

## 2024-01-01 RX ADMIN — PROPOFOL 5.92 MICROGRAM(S)/KG/MIN: 10 INJECTION, EMULSION INTRAVENOUS at 06:24

## 2024-01-01 RX ADMIN — Medication 50 MILLIEQUIVALENT(S): at 19:24

## 2024-01-01 RX ADMIN — Medication 100 MILLIEQUIVALENT(S): at 15:10

## 2024-01-01 RX ADMIN — MIDODRINE HYDROCHLORIDE 10 MILLIGRAM(S): 5 TABLET ORAL at 13:22

## 2024-01-01 RX ADMIN — MIDODRINE HYDROCHLORIDE 15 MILLIGRAM(S): 5 TABLET ORAL at 05:52

## 2024-01-01 RX ADMIN — Medication 1 TABLET(S): at 12:20

## 2024-01-01 RX ADMIN — PROPOFOL 5.92 MICROGRAM(S)/KG/MIN: 10 INJECTION, EMULSION INTRAVENOUS at 19:30

## 2024-01-01 RX ADMIN — MIDODRINE HYDROCHLORIDE 20 MILLIGRAM(S): 5 TABLET ORAL at 05:36

## 2024-01-01 RX ADMIN — VASOPRESSIN 6 UNIT(S)/MIN: 20 INJECTION INTRAVENOUS at 09:00

## 2024-01-01 RX ADMIN — ALBUMIN (HUMAN) 50 MILLILITER(S): 5 SOLUTION INTRAVENOUS at 20:04

## 2024-01-01 RX ADMIN — Medication 1 MILLIGRAM(S): at 11:47

## 2024-01-01 RX ADMIN — Medication 100 MILLIGRAM(S): at 11:41

## 2024-01-01 RX ADMIN — EZETIMIBE 10 MILLIGRAM(S): 10 TABLET ORAL at 21:15

## 2024-01-01 RX ADMIN — Medication 1: at 17:00

## 2024-01-01 RX ADMIN — MIDODRINE HYDROCHLORIDE 10 MILLIGRAM(S): 5 TABLET ORAL at 13:25

## 2024-01-01 RX ADMIN — Medication 50 MILLIEQUIVALENT(S): at 13:56

## 2024-01-01 RX ADMIN — PIPERACILLIN SODIUM AND TAZOBACTAM SODIUM 25 GRAM(S): 3; .375 INJECTION, POWDER, FOR SOLUTION INTRAVENOUS at 13:37

## 2024-01-01 RX ADMIN — Medication 40 MILLIGRAM(S): at 18:42

## 2024-01-01 RX ADMIN — DROXIDOPA 100 MILLIGRAM(S): 300 CAPSULE ORAL at 18:10

## 2024-01-01 RX ADMIN — SODIUM BICARBONATE 650 MILLIGRAM(S): 84 INJECTION, SOLUTION INTRAVENOUS at 21:07

## 2024-01-01 RX ADMIN — LORAZEPAM 1 MILLIGRAM(S): 4 INJECTION INTRAMUSCULAR; INTRAVENOUS at 13:10

## 2024-01-01 RX ADMIN — Medication 1 APPLICATION(S): at 05:38

## 2024-01-01 RX ADMIN — APIXABAN 5 MILLIGRAM(S): 5 TABLET, FILM COATED ORAL at 05:52

## 2024-01-01 RX ADMIN — FOLIC ACID 1 MILLIGRAM(S): 1 TABLET ORAL at 11:53

## 2024-01-01 RX ADMIN — BUMETANIDE 1 MILLIGRAM(S): 2 TABLET ORAL at 17:19

## 2024-01-01 RX ADMIN — Medication 1 PATCH: at 00:00

## 2024-01-01 RX ADMIN — METOPROLOL TARTRATE 12.5 MILLIGRAM(S): 100 TABLET ORAL at 05:31

## 2024-01-01 RX ADMIN — Medication 75 MILLIGRAM(S): at 05:58

## 2024-01-01 RX ADMIN — MIDODRINE HYDROCHLORIDE 30 MILLIGRAM(S): 5 TABLET ORAL at 22:59

## 2024-01-01 RX ADMIN — PETROLATUM 1 APPLICATION(S): 865 OINTMENT TOPICAL at 18:35

## 2024-01-01 RX ADMIN — Medication 125 MILLIGRAM(S): at 00:13

## 2024-01-01 RX ADMIN — Medication 75 MILLILITER(S): at 21:22

## 2024-01-01 RX ADMIN — Medication 1 APPLICATION(S): at 17:01

## 2024-01-01 RX ADMIN — APIXABAN 5 MILLIGRAM(S): 5 TABLET, FILM COATED ORAL at 05:32

## 2024-01-01 RX ADMIN — Medication 40 MILLIGRAM(S): at 05:12

## 2024-01-01 RX ADMIN — Medication 40 MILLIEQUIVALENT(S): at 11:59

## 2024-01-01 RX ADMIN — MIDODRINE HYDROCHLORIDE 10 MILLIGRAM(S): 5 TABLET ORAL at 14:25

## 2024-01-01 RX ADMIN — OXYCODONE HYDROCHLORIDE 400 MILLIGRAM(S): 15 TABLET ORAL at 13:05

## 2024-01-01 RX ADMIN — Medication 100 MILLILITER(S): at 09:26

## 2024-01-01 RX ADMIN — SODIUM CHLORIDE 4 MILLILITER(S): 9 INJECTION INTRAMUSCULAR; INTRAVENOUS; SUBCUTANEOUS at 17:38

## 2024-01-01 RX ADMIN — Medication 1 APPLICATION(S): at 11:28

## 2024-01-01 RX ADMIN — Medication 50 MILLIEQUIVALENT(S): at 14:40

## 2024-01-01 RX ADMIN — Medication 40 MILLIGRAM(S): at 17:50

## 2024-01-01 RX ADMIN — VASOPRESSIN 6 UNIT(S)/MIN: 20 INJECTION INTRAVENOUS at 20:22

## 2024-01-01 RX ADMIN — METOPROLOL TARTRATE 12.5 MILLIGRAM(S): 100 TABLET ORAL at 05:32

## 2024-01-01 RX ADMIN — Medication 0.6 MILLIGRAM(S): at 11:29

## 2024-01-01 RX ADMIN — SODIUM CHLORIDE 4 MILLILITER(S): 9 INJECTION INTRAMUSCULAR; INTRAVENOUS; SUBCUTANEOUS at 18:14

## 2024-01-01 RX ADMIN — OXYCODONE HYDROCHLORIDE 400 MILLIGRAM(S): 15 TABLET ORAL at 21:31

## 2024-01-01 RX ADMIN — Medication 1 TABLET(S): at 11:30

## 2024-01-01 RX ADMIN — Medication 1 APPLICATION(S): at 11:51

## 2024-01-01 RX ADMIN — PHENYLEPHRINE HYDROCHLORIDE 1.85 MICROGRAM(S)/KG/MIN: 10 INJECTION INTRAVENOUS at 10:41

## 2024-01-01 RX ADMIN — SODIUM CHLORIDE 4 MILLILITER(S): 9 INJECTION INTRAMUSCULAR; INTRAVENOUS; SUBCUTANEOUS at 05:37

## 2024-01-01 RX ADMIN — Medication 100 MILLIGRAM(S): at 11:43

## 2024-01-01 RX ADMIN — OXYCODONE HYDROCHLORIDE 600 MILLIGRAM(S): 15 TABLET ORAL at 03:30

## 2024-01-01 RX ADMIN — Medication 81 MILLIGRAM(S): at 11:31

## 2024-01-01 RX ADMIN — CHLORHEXIDINE GLUCONATE 15 MILLILITER(S): 40 SOLUTION TOPICAL at 17:43

## 2024-01-01 RX ADMIN — Medication 40 MILLILITER(S): at 16:42

## 2024-01-01 RX ADMIN — Medication 1: at 17:24

## 2024-01-01 RX ADMIN — SODIUM BICARBONATE 1300 MILLIGRAM(S): 84 INJECTION, SOLUTION INTRAVENOUS at 15:49

## 2024-01-01 RX ADMIN — SODIUM BICARBONATE 1300 MILLIGRAM(S): 84 INJECTION, SOLUTION INTRAVENOUS at 22:59

## 2024-01-01 RX ADMIN — CHLORHEXIDINE GLUCONATE 15 MILLILITER(S): 40 SOLUTION TOPICAL at 05:42

## 2024-01-01 RX ADMIN — Medication 40 MILLIEQUIVALENT(S): at 14:47

## 2024-01-01 RX ADMIN — Medication 1: at 11:58

## 2024-01-01 RX ADMIN — Medication 80 MILLIGRAM(S): at 21:22

## 2024-01-01 RX ADMIN — MIDODRINE HYDROCHLORIDE 15 MILLIGRAM(S): 5 TABLET ORAL at 06:31

## 2024-01-01 RX ADMIN — Medication 1 PATCH: at 12:48

## 2024-01-01 RX ADMIN — DIGOXIN 204 MICROGRAM(S): 0.12 TABLET ORAL at 16:55

## 2024-01-01 RX ADMIN — Medication 1: at 11:30

## 2024-01-01 RX ADMIN — CLOPIDOGREL BISULFATE 75 MILLIGRAM(S): 75 TABLET, FILM COATED ORAL at 11:29

## 2024-01-01 RX ADMIN — CHLORHEXIDINE GLUCONATE 15 MILLILITER(S): 40 SOLUTION TOPICAL at 17:05

## 2024-01-01 RX ADMIN — CEFEPIME 100 MILLIGRAM(S): 2 INJECTION, POWDER, FOR SOLUTION INTRAVENOUS at 21:30

## 2024-01-01 RX ADMIN — DROXIDOPA 100 MILLIGRAM(S): 300 CAPSULE ORAL at 05:02

## 2024-01-01 RX ADMIN — CHLORHEXIDINE GLUCONATE 1 APPLICATION(S): 40 SOLUTION TOPICAL at 05:16

## 2024-01-01 RX ADMIN — OXYCODONE HYDROCHLORIDE 33.3 MG/MIN: 15 TABLET ORAL at 10:00

## 2024-01-01 RX ADMIN — MEROPENEM 100 MILLIGRAM(S): 500 INJECTION, POWDER, FOR SOLUTION INTRAVENOUS at 23:00

## 2024-01-01 RX ADMIN — GUAIFENESIN 1200 MILLIGRAM(S): 100 LIQUID ORAL at 05:02

## 2024-01-01 RX ADMIN — SODIUM BICARBONATE 650 MILLIGRAM(S): 84 INJECTION, SOLUTION INTRAVENOUS at 05:02

## 2024-01-01 RX ADMIN — Medication 25 GRAM(S): at 12:47

## 2024-01-01 RX ADMIN — Medication 100 MILLIGRAM(S): at 12:00

## 2024-01-01 RX ADMIN — CHLORHEXIDINE GLUCONATE 1 APPLICATION(S): 40 SOLUTION TOPICAL at 05:45

## 2024-01-01 RX ADMIN — Medication 100 MILLIGRAM(S): at 12:08

## 2024-01-01 RX ADMIN — BUMETANIDE 1 MILLIGRAM(S): 2 TABLET ORAL at 13:49

## 2024-01-01 RX ADMIN — PROPOFOL 11.8 MICROGRAM(S)/KG/MIN: 10 INJECTION, EMULSION INTRAVENOUS at 21:55

## 2024-01-01 RX ADMIN — ACETAMINOPHEN 500 MILLIGRAM(S): 325 TABLET ORAL at 23:05

## 2024-01-01 RX ADMIN — SODIUM BICARBONATE 650 MILLIGRAM(S): 84 INJECTION, SOLUTION INTRAVENOUS at 06:18

## 2024-01-01 RX ADMIN — DROXIDOPA 200 MILLIGRAM(S): 300 CAPSULE ORAL at 23:06

## 2024-01-01 RX ADMIN — SODIUM BICARBONATE 1300 MILLIGRAM(S): 84 INJECTION, SOLUTION INTRAVENOUS at 05:38

## 2024-01-01 RX ADMIN — Medication 75 MILLIGRAM(S): at 12:39

## 2024-01-01 RX ADMIN — LORAZEPAM 2 MILLIGRAM(S): 4 INJECTION INTRAMUSCULAR; INTRAVENOUS at 01:47

## 2024-01-01 RX ADMIN — Medication 40 MILLIGRAM(S): at 22:56

## 2024-01-01 RX ADMIN — DEXMEDETOMIDINE HYDROCHLORIDE IN 0.9% SODIUM CHLORIDE 6.28 MICROGRAM(S)/KG/HR: 4 INJECTION INTRAVENOUS at 22:08

## 2024-01-01 RX ADMIN — Medication 80 MILLIGRAM(S): at 21:19

## 2024-01-01 RX ADMIN — METOPROLOL TARTRATE 12.5 MILLIGRAM(S): 100 TABLET ORAL at 18:24

## 2024-01-01 RX ADMIN — DROXIDOPA 300 MILLIGRAM(S): 300 CAPSULE ORAL at 21:36

## 2024-01-01 RX ADMIN — Medication 80 MILLIGRAM(S): at 21:01

## 2024-01-01 RX ADMIN — MEROPENEM 100 MILLIGRAM(S): 500 INJECTION, POWDER, FOR SOLUTION INTRAVENOUS at 13:04

## 2024-01-01 RX ADMIN — ACETAMINOPHEN 1000 MILLIGRAM(S): 325 TABLET ORAL at 13:30

## 2024-01-01 RX ADMIN — Medication 50 MILLIEQUIVALENT(S): at 17:05

## 2024-01-01 RX ADMIN — Medication 100 MILLIGRAM(S): at 17:10

## 2024-01-01 RX ADMIN — METOPROLOL TARTRATE 12.5 MILLIGRAM(S): 100 TABLET ORAL at 05:02

## 2024-01-01 RX ADMIN — IPRATROPIUM BROMIDE AND ALBUTEROL SULFATE 3 MILLILITER(S): .5; 3 SOLUTION RESPIRATORY (INHALATION) at 18:43

## 2024-01-01 RX ADMIN — HYDROCORTISONE 50 MILLIGRAM(S): 10 TABLET ORAL at 18:01

## 2024-01-01 RX ADMIN — CEFEPIME 100 MILLIGRAM(S): 2 INJECTION, POWDER, FOR SOLUTION INTRAVENOUS at 05:08

## 2024-01-01 RX ADMIN — VASOPRESSIN 6 UNIT(S)/MIN: 20 INJECTION INTRAVENOUS at 19:22

## 2024-01-01 RX ADMIN — Medication 50 MILLIEQUIVALENT(S): at 17:28

## 2024-01-01 RX ADMIN — GUAIFENESIN 300 MILLIGRAM(S): 100 LIQUID ORAL at 22:29

## 2024-01-01 RX ADMIN — MIDODRINE HYDROCHLORIDE 30 MILLIGRAM(S): 5 TABLET ORAL at 13:04

## 2024-01-01 RX ADMIN — SODIUM CHLORIDE 4 MILLILITER(S): 9 INJECTION INTRAMUSCULAR; INTRAVENOUS; SUBCUTANEOUS at 06:30

## 2024-01-01 RX ADMIN — ACETAMINOPHEN 650 MILLIGRAM(S): 325 TABLET ORAL at 00:00

## 2024-01-01 RX ADMIN — Medication 1000 MILLILITER(S): at 21:14

## 2024-01-01 RX ADMIN — MIDODRINE HYDROCHLORIDE 30 MILLIGRAM(S): 5 TABLET ORAL at 14:31

## 2024-01-01 RX ADMIN — BUMETANIDE 2 MILLIGRAM(S): 2 TABLET ORAL at 10:42

## 2024-01-01 RX ADMIN — ATORVASTATIN CALCIUM 80 MILLIGRAM(S): 80 TABLET, FILM COATED ORAL at 20:50

## 2024-01-01 RX ADMIN — CLOPIDOGREL BISULFATE 75 MILLIGRAM(S): 75 TABLET, FILM COATED ORAL at 11:31

## 2024-01-01 RX ADMIN — SODIUM BICARBONATE 1300 MILLIGRAM(S): 84 INJECTION, SOLUTION INTRAVENOUS at 22:03

## 2024-01-01 RX ADMIN — TORSEMIDE 20 MILLIGRAM(S): 20 TABLET ORAL at 05:06

## 2024-01-01 RX ADMIN — ACETAMINOPHEN 400 MILLIGRAM(S): 325 TABLET ORAL at 17:06

## 2024-01-01 RX ADMIN — ALBUMIN (HUMAN) 125 MILLILITER(S): 5 SOLUTION INTRAVENOUS at 07:45

## 2024-01-01 RX ADMIN — Medication 40 MILLIGRAM(S): at 06:30

## 2024-01-01 RX ADMIN — OCTREOTIDE ACETATE 250 MICROGRAM(S): 100 INJECTION, SOLUTION INTRAVENOUS; SUBCUTANEOUS at 21:31

## 2024-01-01 RX ADMIN — PHENYLEPHRINE HYDROCHLORIDE 1.85 MICROGRAM(S)/KG/MIN: 10 INJECTION INTRAVENOUS at 21:31

## 2024-01-01 RX ADMIN — MIDODRINE HYDROCHLORIDE 10 MILLIGRAM(S): 5 TABLET ORAL at 21:12

## 2024-01-01 RX ADMIN — CEFEPIME 100 MILLIGRAM(S): 2 INJECTION, POWDER, FOR SOLUTION INTRAVENOUS at 15:47

## 2024-01-01 RX ADMIN — Medication 4: at 05:59

## 2024-01-01 RX ADMIN — BUMETANIDE 1 MILLIGRAM(S): 2 TABLET ORAL at 17:49

## 2024-01-01 RX ADMIN — MIDODRINE HYDROCHLORIDE 15 MILLIGRAM(S): 5 TABLET ORAL at 11:30

## 2024-01-01 RX ADMIN — PIPERACILLIN SODIUM AND TAZOBACTAM SODIUM 200 GRAM(S): 3; .375 INJECTION, POWDER, FOR SOLUTION INTRAVENOUS at 04:39

## 2024-01-01 RX ADMIN — MIDODRINE HYDROCHLORIDE 15 MILLIGRAM(S): 5 TABLET ORAL at 05:02

## 2024-01-01 RX ADMIN — Medication 40 MILLIGRAM(S): at 06:23

## 2024-01-01 RX ADMIN — DIGOXIN 62.5 MICROGRAM(S): 0.12 TABLET ORAL at 18:38

## 2024-01-01 RX ADMIN — Medication 80 MILLIGRAM(S): at 00:15

## 2024-01-01 RX ADMIN — CHLORHEXIDINE GLUCONATE 1 APPLICATION(S): 40 SOLUTION TOPICAL at 05:12

## 2024-01-01 RX ADMIN — ACETAMINOPHEN 650 MILLIGRAM(S): 325 TABLET ORAL at 15:32

## 2024-01-01 RX ADMIN — Medication 100 MILLILITER(S): at 23:30

## 2024-01-01 RX ADMIN — MIDODRINE HYDROCHLORIDE 10 MILLIGRAM(S): 5 TABLET ORAL at 13:17

## 2024-01-01 RX ADMIN — Medication 100 MILLIEQUIVALENT(S): at 02:44

## 2024-01-01 RX ADMIN — Medication 1: at 12:30

## 2024-01-01 RX ADMIN — Medication 1 TABLET(S): at 10:57

## 2024-01-01 RX ADMIN — CLOPIDOGREL BISULFATE 75 MILLIGRAM(S): 75 TABLET, FILM COATED ORAL at 11:48

## 2024-01-01 RX ADMIN — CEFEPIME 100 MILLIGRAM(S): 2 INJECTION, POWDER, FOR SOLUTION INTRAVENOUS at 13:55

## 2024-01-01 RX ADMIN — Medication 100 MILLIGRAM(S): at 12:55

## 2024-01-01 RX ADMIN — MIDODRINE HYDROCHLORIDE 15 MILLIGRAM(S): 5 TABLET ORAL at 05:05

## 2024-01-01 RX ADMIN — METOPROLOL TARTRATE 25 MILLIGRAM(S): 100 TABLET ORAL at 05:59

## 2024-01-01 RX ADMIN — HEPARIN SODIUM 1800 UNIT(S)/HR: 1000 INJECTION INTRAVENOUS; SUBCUTANEOUS at 18:54

## 2024-01-01 RX ADMIN — OCTREOTIDE ACETATE 250 MICROGRAM(S): 100 INJECTION, SOLUTION INTRAVENOUS; SUBCUTANEOUS at 05:18

## 2024-01-01 RX ADMIN — MEROPENEM 100 MILLIGRAM(S): 500 INJECTION, POWDER, FOR SOLUTION INTRAVENOUS at 23:35

## 2024-01-01 RX ADMIN — PHENYLEPHRINE HYDROCHLORIDE 9.25 MICROGRAM(S)/KG/MIN: 10 INJECTION INTRAVENOUS at 19:13

## 2024-01-01 RX ADMIN — OXYCODONE HYDROCHLORIDE 200 MILLIGRAM(S): 15 TABLET ORAL at 06:06

## 2024-01-01 RX ADMIN — ONDANSETRON 4 MILLIGRAM(S): 2 INJECTION, SOLUTION INTRAMUSCULAR; INTRAVENOUS at 05:59

## 2024-01-01 RX ADMIN — Medication 40 MILLIEQUIVALENT(S): at 15:11

## 2024-01-01 RX ADMIN — Medication 75 MILLIGRAM(S): at 12:19

## 2024-01-01 RX ADMIN — Medication 25 GRAM(S): at 04:24

## 2024-01-01 RX ADMIN — OXYCODONE HYDROCHLORIDE 16.7 MG/MIN: 15 TABLET ORAL at 05:57

## 2024-01-01 RX ADMIN — PIPERACILLIN SODIUM AND TAZOBACTAM SODIUM 25 GRAM(S): 3; .375 INJECTION, POWDER, FOR SOLUTION INTRAVENOUS at 05:57

## 2024-01-01 RX ADMIN — OXYCODONE HYDROCHLORIDE 200 MILLIGRAM(S): 15 TABLET ORAL at 05:27

## 2024-01-01 RX ADMIN — Medication 100 MILLIGRAM(S): at 12:52

## 2024-01-01 RX ADMIN — Medication 25 GRAM(S): at 14:48

## 2024-01-01 RX ADMIN — Medication 40 MILLIEQUIVALENT(S): at 10:09

## 2024-01-01 RX ADMIN — Medication 40 MILLIGRAM(S): at 05:06

## 2024-01-01 RX ADMIN — PETROLATUM 1 APPLICATION(S): 865 OINTMENT TOPICAL at 06:05

## 2024-01-01 RX ADMIN — OXYCODONE HYDROCHLORIDE 600 MILLIGRAM(S): 15 TABLET ORAL at 21:31

## 2024-01-01 RX ADMIN — SOTALOL HYDROCHLORIDE 40 MILLIGRAM(S): 120 TABLET ORAL at 02:14

## 2024-01-01 RX ADMIN — Medication 40 MILLIEQUIVALENT(S): at 05:30

## 2024-01-01 RX ADMIN — DROXIDOPA 300 MILLIGRAM(S): 300 CAPSULE ORAL at 21:51

## 2024-01-01 RX ADMIN — MIDODRINE HYDROCHLORIDE 30 MILLIGRAM(S): 5 TABLET ORAL at 05:02

## 2024-01-01 RX ADMIN — Medication 100 MILLILITER(S): at 14:04

## 2024-01-01 RX ADMIN — SOTALOL HYDROCHLORIDE 40 MILLIGRAM(S): 120 TABLET ORAL at 05:12

## 2024-01-01 RX ADMIN — Medication 500 MILLILITER(S): at 05:16

## 2024-01-01 RX ADMIN — METOPROLOL TARTRATE 12.5 MILLIGRAM(S): 100 TABLET ORAL at 17:10

## 2024-01-01 RX ADMIN — Medication 1: at 11:01

## 2024-01-01 RX ADMIN — OCTREOTIDE ACETATE 250 MICROGRAM(S): 100 INJECTION, SOLUTION INTRAVENOUS; SUBCUTANEOUS at 21:49

## 2024-01-01 RX ADMIN — Medication 125 MILLIGRAM(S): at 05:57

## 2024-01-01 RX ADMIN — Medication 9.25 MICROGRAM(S)/KG/MIN: at 21:31

## 2024-01-01 RX ADMIN — ALBUMIN (HUMAN) 50 MILLILITER(S): 5 SOLUTION INTRAVENOUS at 12:58

## 2024-01-01 RX ADMIN — Medication 100 MILLIGRAM(S): at 17:44

## 2024-01-01 RX ADMIN — Medication 1 MILLIGRAM(S): at 13:09

## 2024-01-01 RX ADMIN — Medication 100 GRAM(S): at 10:18

## 2024-01-01 RX ADMIN — MIDODRINE HYDROCHLORIDE 10 MILLIGRAM(S): 5 TABLET ORAL at 05:08

## 2024-01-01 RX ADMIN — PROPOFOL 5.92 MICROGRAM(S)/KG/MIN: 10 INJECTION, EMULSION INTRAVENOUS at 20:51

## 2024-01-01 RX ADMIN — SODIUM CHLORIDE 4 MILLILITER(S): 9 INJECTION INTRAMUSCULAR; INTRAVENOUS; SUBCUTANEOUS at 18:16

## 2024-01-01 RX ADMIN — ACETAMINOPHEN 1000 MILLIGRAM(S): 325 TABLET ORAL at 01:12

## 2024-01-01 RX ADMIN — Medication 2: at 06:03

## 2024-01-01 RX ADMIN — Medication 40 MILLIGRAM(S): at 12:24

## 2024-01-01 RX ADMIN — OCTREOTIDE ACETATE 250 MICROGRAM(S): 100 INJECTION, SOLUTION INTRAVENOUS; SUBCUTANEOUS at 14:21

## 2024-01-01 RX ADMIN — ACETAMINOPHEN 400 MILLIGRAM(S): 325 TABLET ORAL at 23:43

## 2024-01-01 RX ADMIN — PETROLATUM 1 APPLICATION(S): 865 OINTMENT TOPICAL at 17:42

## 2024-01-01 RX ADMIN — Medication 25 GRAM(S): at 05:46

## 2024-01-01 RX ADMIN — MEROPENEM 100 MILLIGRAM(S): 500 INJECTION, POWDER, FOR SOLUTION INTRAVENOUS at 15:50

## 2024-01-01 RX ADMIN — Medication 125 MILLIGRAM(S): at 05:01

## 2024-01-01 RX ADMIN — MIDODRINE HYDROCHLORIDE 15 MILLIGRAM(S): 5 TABLET ORAL at 17:31

## 2024-01-01 RX ADMIN — OXYCODONE HYDROCHLORIDE 600 MILLIGRAM(S): 15 TABLET ORAL at 02:42

## 2024-01-01 RX ADMIN — AMOXICILLIN AND CLAVULANATE POTASSIUM 1 TABLET(S): 500; 125 TABLET, FILM COATED ORAL at 05:12

## 2024-01-01 RX ADMIN — DROXIDOPA 200 MILLIGRAM(S): 300 CAPSULE ORAL at 05:58

## 2024-01-01 RX ADMIN — ACETAMINOPHEN 400 MILLIGRAM(S): 325 TABLET ORAL at 10:29

## 2024-01-01 RX ADMIN — EZETIMIBE 10 MILLIGRAM(S): 10 TABLET ORAL at 21:24

## 2024-01-01 RX ADMIN — SODIUM CHLORIDE 4 MILLILITER(S): 9 INJECTION INTRAMUSCULAR; INTRAVENOUS; SUBCUTANEOUS at 17:13

## 2024-01-01 RX ADMIN — Medication 100 MILLIEQUIVALENT(S): at 08:43

## 2024-01-01 RX ADMIN — Medication 500 MILLIGRAM(S): at 11:29

## 2024-01-01 RX ADMIN — SODIUM BICARBONATE 1950 MILLIGRAM(S): 84 INJECTION, SOLUTION INTRAVENOUS at 05:37

## 2024-01-01 RX ADMIN — METOPROLOL TARTRATE 5 MILLIGRAM(S): 100 TABLET ORAL at 03:26

## 2024-01-01 RX ADMIN — OCTREOTIDE ACETATE 250 MICROGRAM(S): 100 INJECTION, SOLUTION INTRAVENOUS; SUBCUTANEOUS at 05:33

## 2024-01-01 RX ADMIN — OXYCODONE HYDROCHLORIDE 400 MILLIGRAM(S): 15 TABLET ORAL at 15:49

## 2024-01-01 RX ADMIN — MIDODRINE HYDROCHLORIDE 30 MILLIGRAM(S): 5 TABLET ORAL at 21:40

## 2024-01-01 RX ADMIN — CLOPIDOGREL BISULFATE 75 MILLIGRAM(S): 75 TABLET, FILM COATED ORAL at 12:25

## 2024-01-01 RX ADMIN — FENTANYL CITRATE 25 MICROGRAM(S): 50 INJECTION INTRAMUSCULAR; INTRAVENOUS at 22:50

## 2024-01-01 RX ADMIN — PETROLATUM 1 APPLICATION(S): 865 OINTMENT TOPICAL at 06:19

## 2024-01-01 RX ADMIN — Medication 40 MILLIEQUIVALENT(S): at 20:21

## 2024-01-01 RX ADMIN — ACETAMINOPHEN 1000 MILLIGRAM(S): 325 TABLET ORAL at 21:30

## 2024-01-01 RX ADMIN — Medication 100 MILLIGRAM(S): at 13:25

## 2024-01-01 RX ADMIN — CHLORHEXIDINE GLUCONATE 15 MILLILITER(S): 40 SOLUTION TOPICAL at 05:16

## 2024-01-01 RX ADMIN — SODIUM BICARBONATE 1300 MILLIGRAM(S): 84 INJECTION, SOLUTION INTRAVENOUS at 13:06

## 2024-01-01 RX ADMIN — Medication 1 MILLIGRAM(S): at 11:28

## 2024-01-01 RX ADMIN — PROPOFOL 5.92 MICROGRAM(S)/KG/MIN: 10 INJECTION, EMULSION INTRAVENOUS at 15:31

## 2024-01-01 RX ADMIN — PHENYLEPHRINE HYDROCHLORIDE 9.25 MICROGRAM(S)/KG/MIN: 10 INJECTION INTRAVENOUS at 12:28

## 2024-01-01 RX ADMIN — Medication 100 MILLIGRAM(S): at 17:28

## 2024-01-01 RX ADMIN — MIDODRINE HYDROCHLORIDE 20 MILLIGRAM(S): 5 TABLET ORAL at 17:33

## 2024-01-01 RX ADMIN — Medication 1 PATCH: at 19:00

## 2024-01-01 RX ADMIN — Medication 100 MILLIGRAM(S): at 12:45

## 2024-01-01 RX ADMIN — Medication 2 MILLIGRAM(S): at 21:36

## 2024-01-01 RX ADMIN — Medication 40 MILLIGRAM(S): at 04:20

## 2024-01-01 RX ADMIN — POTASSIUM PHOSPHATE 85 MILLIMOLE(S): 236; 224 INJECTION, SOLUTION INTRAVENOUS at 05:46

## 2024-01-01 RX ADMIN — OCTREOTIDE ACETATE 250 MICROGRAM(S): 100 INJECTION, SOLUTION INTRAVENOUS; SUBCUTANEOUS at 05:13

## 2024-01-01 RX ADMIN — Medication 2: at 01:40

## 2024-01-01 RX ADMIN — SOTALOL HYDROCHLORIDE 40 MILLIGRAM(S): 120 TABLET ORAL at 06:31

## 2024-01-01 RX ADMIN — HYDROCORTISONE 50 MILLIGRAM(S): 10 TABLET ORAL at 10:46

## 2024-01-01 RX ADMIN — Medication 100 MILLIEQUIVALENT(S): at 02:03

## 2024-01-01 RX ADMIN — AMOXICILLIN AND CLAVULANATE POTASSIUM 1 TABLET(S): 500; 125 TABLET, FILM COATED ORAL at 05:05

## 2024-01-01 RX ADMIN — SODIUM CHLORIDE 4 MILLILITER(S): 9 INJECTION INTRAMUSCULAR; INTRAVENOUS; SUBCUTANEOUS at 06:06

## 2024-01-01 RX ADMIN — Medication 40 MILLIEQUIVALENT(S): at 15:30

## 2024-01-01 RX ADMIN — SODIUM BICARBONATE 1300 MILLIGRAM(S): 84 INJECTION, SOLUTION INTRAVENOUS at 05:53

## 2024-01-01 RX ADMIN — SOTALOL HYDROCHLORIDE 40 MILLIGRAM(S): 120 TABLET ORAL at 05:51

## 2024-01-01 RX ADMIN — METOPROLOL TARTRATE 12.5 MILLIGRAM(S): 100 TABLET ORAL at 05:09

## 2024-01-01 RX ADMIN — FENTANYL CITRATE 50 MICROGRAM(S): 50 INJECTION INTRAMUSCULAR; INTRAVENOUS at 18:10

## 2024-01-01 RX ADMIN — SODIUM BICARBONATE 200 MEQ/KG/HR: 84 INJECTION, SOLUTION INTRAVENOUS at 10:51

## 2024-01-01 RX ADMIN — SODIUM BICARBONATE 650 MILLIGRAM(S): 84 INJECTION, SOLUTION INTRAVENOUS at 13:22

## 2024-01-01 RX ADMIN — CHLORHEXIDINE GLUCONATE 15 MILLILITER(S): 40 SOLUTION TOPICAL at 17:06

## 2024-01-01 RX ADMIN — CHLORHEXIDINE GLUCONATE 1 APPLICATION(S): 40 SOLUTION TOPICAL at 05:21

## 2024-01-01 RX ADMIN — SODIUM CHLORIDE 4 MILLILITER(S): 9 INJECTION INTRAMUSCULAR; INTRAVENOUS; SUBCUTANEOUS at 17:27

## 2024-01-01 RX ADMIN — GUAIFENESIN 300 MILLIGRAM(S): 100 LIQUID ORAL at 05:37

## 2024-01-01 RX ADMIN — CEFEPIME 100 MILLIGRAM(S): 2 INJECTION, POWDER, FOR SOLUTION INTRAVENOUS at 21:56

## 2024-01-01 RX ADMIN — ALBUMIN (HUMAN) 50 MILLILITER(S): 5 SOLUTION INTRAVENOUS at 13:33

## 2024-01-01 RX ADMIN — Medication 100 MILLIEQUIVALENT(S): at 13:37

## 2024-01-01 RX ADMIN — CHLORHEXIDINE GLUCONATE 1 APPLICATION(S): 40 SOLUTION TOPICAL at 05:17

## 2024-01-01 RX ADMIN — SODIUM BICARBONATE 1300 MILLIGRAM(S): 84 INJECTION, SOLUTION INTRAVENOUS at 06:31

## 2024-01-01 RX ADMIN — MEROPENEM 100 MILLIGRAM(S): 500 INJECTION, POWDER, FOR SOLUTION INTRAVENOUS at 13:03

## 2024-01-01 RX ADMIN — HYDROMORPHONE HYDROCHLORIDE 0.5 MILLIGRAM(S): 2 TABLET ORAL at 03:36

## 2024-01-01 RX ADMIN — METOPROLOL TARTRATE 12.5 MILLIGRAM(S): 100 TABLET ORAL at 06:28

## 2024-01-01 RX ADMIN — Medication 2 MILLIGRAM(S): at 21:07

## 2024-01-01 RX ADMIN — MIDODRINE HYDROCHLORIDE 30 MILLIGRAM(S): 5 TABLET ORAL at 21:36

## 2024-01-01 RX ADMIN — Medication 40 MILLIEQUIVALENT(S): at 14:12

## 2024-01-01 RX ADMIN — MIDODRINE HYDROCHLORIDE 30 MILLIGRAM(S): 5 TABLET ORAL at 21:07

## 2024-01-01 RX ADMIN — Medication 500 MILLILITER(S): at 11:20

## 2024-01-01 RX ADMIN — ACETAMINOPHEN 650 MILLIGRAM(S): 325 TABLET ORAL at 15:40

## 2024-01-01 RX ADMIN — LORAZEPAM 2 MILLIGRAM(S): 4 INJECTION INTRAMUSCULAR; INTRAVENOUS at 12:41

## 2024-01-01 RX ADMIN — DIGOXIN 125 MICROGRAM(S): 0.12 TABLET ORAL at 06:34

## 2024-01-01 RX ADMIN — Medication 40 MILLIGRAM(S): at 05:10

## 2024-01-01 RX ADMIN — Medication 75 MILLILITER(S): at 05:41

## 2024-01-01 RX ADMIN — MIDODRINE HYDROCHLORIDE 15 MILLIGRAM(S): 5 TABLET ORAL at 11:29

## 2024-01-01 RX ADMIN — ACETAMINOPHEN 650 MILLIGRAM(S): 325 TABLET ORAL at 16:30

## 2024-01-01 RX ADMIN — Medication 100 MILLIEQUIVALENT(S): at 00:48

## 2024-01-01 RX ADMIN — Medication 125 MILLIGRAM(S): at 11:00

## 2024-01-01 RX ADMIN — Medication 100 MILLIEQUIVALENT(S): at 09:33

## 2024-01-01 RX ADMIN — APIXABAN 5 MILLIGRAM(S): 5 TABLET, FILM COATED ORAL at 05:35

## 2024-01-01 RX ADMIN — DIGOXIN 125 MICROGRAM(S): 0.12 TABLET ORAL at 05:26

## 2024-01-01 RX ADMIN — SODIUM BICARBONATE 1300 MILLIGRAM(S): 84 INJECTION, SOLUTION INTRAVENOUS at 21:30

## 2024-01-01 RX ADMIN — HEPARIN SODIUM 1800 UNIT(S)/HR: 1000 INJECTION INTRAVENOUS; SUBCUTANEOUS at 07:26

## 2024-01-01 RX ADMIN — Medication 100 MILLIGRAM(S): at 17:12

## 2024-01-01 RX ADMIN — CEFEPIME 100 MILLIGRAM(S): 2 INJECTION, POWDER, FOR SOLUTION INTRAVENOUS at 10:30

## 2024-01-01 RX ADMIN — PROPOFOL 11.8 MICROGRAM(S)/KG/MIN: 10 INJECTION, EMULSION INTRAVENOUS at 21:32

## 2024-01-01 RX ADMIN — PHENYLEPHRINE HYDROCHLORIDE 9.25 MICROGRAM(S)/KG/MIN: 10 INJECTION INTRAVENOUS at 05:33

## 2024-01-01 RX ADMIN — OCTREOTIDE ACETATE 250 MICROGRAM(S): 100 INJECTION, SOLUTION INTRAVENOUS; SUBCUTANEOUS at 06:34

## 2024-01-01 RX ADMIN — MIDODRINE HYDROCHLORIDE 10 MILLIGRAM(S): 5 TABLET ORAL at 21:35

## 2024-01-01 RX ADMIN — Medication 100 MILLIGRAM(S): at 12:12

## 2024-01-01 RX ADMIN — Medication 9.25 MICROGRAM(S)/KG/MIN: at 05:39

## 2024-01-01 RX ADMIN — ACETAMINOPHEN 650 MILLIGRAM(S): 325 TABLET ORAL at 20:20

## 2024-01-01 RX ADMIN — CLOPIDOGREL BISULFATE 75 MILLIGRAM(S): 75 TABLET, FILM COATED ORAL at 22:44

## 2024-01-01 RX ADMIN — Medication 1: at 00:58

## 2024-01-01 RX ADMIN — PHENYLEPHRINE HYDROCHLORIDE 9.25 MICROGRAM(S)/KG/MIN: 10 INJECTION INTRAVENOUS at 21:12

## 2024-01-01 RX ADMIN — CEFEPIME 100 MILLIGRAM(S): 2 INJECTION, POWDER, FOR SOLUTION INTRAVENOUS at 06:19

## 2024-01-01 RX ADMIN — ALBUMIN (HUMAN) 50 MILLILITER(S): 5 SOLUTION INTRAVENOUS at 04:15

## 2024-01-01 RX ADMIN — ALBUMIN (HUMAN) 50 MILLILITER(S): 5 SOLUTION INTRAVENOUS at 14:25

## 2024-01-01 RX ADMIN — Medication 100 MILLIEQUIVALENT(S): at 03:48

## 2024-01-01 RX ADMIN — APIXABAN 5 MILLIGRAM(S): 5 TABLET, FILM COATED ORAL at 05:29

## 2024-01-01 RX ADMIN — Medication 1 APPLICATION(S): at 11:53

## 2024-01-01 RX ADMIN — HEPARIN SODIUM 2000 UNIT(S)/HR: 1000 INJECTION INTRAVENOUS; SUBCUTANEOUS at 07:00

## 2024-01-01 RX ADMIN — Medication 1: at 08:23

## 2024-01-01 RX ADMIN — SODIUM BICARBONATE 1950 MILLIGRAM(S): 84 INJECTION, SOLUTION INTRAVENOUS at 21:54

## 2024-01-01 RX ADMIN — Medication 1: at 11:17

## 2024-01-01 RX ADMIN — Medication 4.63 MICROGRAM(S)/KG/MIN: at 19:23

## 2024-01-01 RX ADMIN — ALBUMIN (HUMAN) 250 MILLILITER(S): 5 SOLUTION INTRAVENOUS at 23:39

## 2024-01-01 RX ADMIN — ATORVASTATIN CALCIUM 80 MILLIGRAM(S): 80 TABLET, FILM COATED ORAL at 21:05

## 2024-01-01 RX ADMIN — DIGOXIN 125 MICROGRAM(S): 0.12 TABLET ORAL at 12:51

## 2024-01-01 RX ADMIN — Medication 1 APPLICATION(S): at 05:28

## 2024-01-01 RX ADMIN — Medication 1000 MILLILITER(S): at 13:14

## 2024-01-01 RX ADMIN — Medication 25 GRAM(S): at 11:08

## 2024-01-01 RX ADMIN — ALBUMIN (HUMAN) 125 MILLILITER(S): 5 SOLUTION INTRAVENOUS at 22:56

## 2024-01-01 RX ADMIN — OXYCODONE HYDROCHLORIDE 600 MILLIGRAM(S): 15 TABLET ORAL at 09:49

## 2024-01-01 RX ADMIN — Medication 1 MILLIGRAM(S): at 12:12

## 2024-01-01 RX ADMIN — Medication 125 MILLIGRAM(S): at 23:57

## 2024-01-01 RX ADMIN — Medication 102 MILLIGRAM(S): at 06:15

## 2024-01-01 RX ADMIN — Medication 25 GRAM(S): at 01:36

## 2024-01-01 RX ADMIN — DROXIDOPA 300 MILLIGRAM(S): 300 CAPSULE ORAL at 13:33

## 2024-01-01 RX ADMIN — Medication 81 MILLIGRAM(S): at 22:48

## 2024-01-01 RX ADMIN — GUAIFENESIN 300 MILLIGRAM(S): 100 LIQUID ORAL at 01:04

## 2024-01-01 RX ADMIN — Medication 75 MILLILITER(S): at 05:14

## 2024-01-01 RX ADMIN — Medication 1 MILLIGRAM(S): at 11:09

## 2024-01-01 RX ADMIN — PETROLATUM 1 APPLICATION(S): 865 OINTMENT TOPICAL at 06:10

## 2024-01-01 RX ADMIN — MEROPENEM 100 MILLIGRAM(S): 500 INJECTION, POWDER, FOR SOLUTION INTRAVENOUS at 05:58

## 2024-01-01 RX ADMIN — PIPERACILLIN SODIUM AND TAZOBACTAM SODIUM 25 GRAM(S): 3; .375 INJECTION, POWDER, FOR SOLUTION INTRAVENOUS at 21:36

## 2024-01-01 RX ADMIN — VASOPRESSIN 6 UNIT(S)/MIN: 20 INJECTION INTRAVENOUS at 05:33

## 2024-01-01 RX ADMIN — MIDODRINE HYDROCHLORIDE 30 MILLIGRAM(S): 5 TABLET ORAL at 13:36

## 2024-01-01 RX ADMIN — MIDODRINE HYDROCHLORIDE 10 MILLIGRAM(S): 5 TABLET ORAL at 13:45

## 2024-01-01 RX ADMIN — SODIUM PHOSPHATE, DIBASIC, ANHYDROUS, POTASSIUM PHOSPHATE, MONOBASIC, AND SODIUM PHOSPHATE, MONOBASIC, MONOHYDRATE 1 PACKET(S): 852; 155; 130 TABLET, COATED ORAL at 13:32

## 2024-01-01 RX ADMIN — SODIUM BICARBONATE 1950 MILLIGRAM(S): 84 INJECTION, SOLUTION INTRAVENOUS at 21:22

## 2024-01-01 RX ADMIN — MEROPENEM 100 MILLIGRAM(S): 500 INJECTION, POWDER, FOR SOLUTION INTRAVENOUS at 13:37

## 2024-01-01 RX ADMIN — MIDODRINE HYDROCHLORIDE 15 MILLIGRAM(S): 5 TABLET ORAL at 10:57

## 2024-01-01 RX ADMIN — Medication 1 APPLICATION(S): at 05:53

## 2024-01-01 RX ADMIN — PHENYLEPHRINE HYDROCHLORIDE 1.85 MICROGRAM(S)/KG/MIN: 10 INJECTION INTRAVENOUS at 21:43

## 2024-01-01 RX ADMIN — VASOPRESSIN 6 UNIT(S)/MIN: 20 INJECTION INTRAVENOUS at 23:31

## 2024-01-01 RX ADMIN — Medication 100 MILLIEQUIVALENT(S): at 14:39

## 2024-01-01 RX ADMIN — Medication 500 MILLIGRAM(S): at 11:07

## 2024-01-01 RX ADMIN — EZETIMIBE 10 MILLIGRAM(S): 10 TABLET ORAL at 21:05

## 2024-01-01 RX ADMIN — ALBUMIN (HUMAN) 125 MILLILITER(S): 5 SOLUTION INTRAVENOUS at 10:04

## 2024-01-01 RX ADMIN — MIDODRINE HYDROCHLORIDE 15 MILLIGRAM(S): 5 TABLET ORAL at 18:05

## 2024-01-01 RX ADMIN — SOTALOL HYDROCHLORIDE 40 MILLIGRAM(S): 120 TABLET ORAL at 05:56

## 2024-01-01 RX ADMIN — SODIUM BICARBONATE 650 MILLIGRAM(S): 84 INJECTION, SOLUTION INTRAVENOUS at 05:58

## 2024-01-01 RX ADMIN — VASOPRESSIN 6 UNIT(S)/MIN: 20 INJECTION INTRAVENOUS at 04:37

## 2024-01-01 RX ADMIN — SOTALOL HYDROCHLORIDE 40 MILLIGRAM(S): 120 TABLET ORAL at 05:29

## 2024-01-01 RX ADMIN — Medication 1 MILLIGRAM(S): at 12:24

## 2024-01-01 RX ADMIN — CHLORHEXIDINE GLUCONATE 1 APPLICATION(S): 40 SOLUTION TOPICAL at 05:20

## 2024-01-01 RX ADMIN — MIDODRINE HYDROCHLORIDE 15 MILLIGRAM(S): 5 TABLET ORAL at 17:53

## 2024-01-01 RX ADMIN — MIDODRINE HYDROCHLORIDE 10 MILLIGRAM(S): 5 TABLET ORAL at 22:04

## 2024-01-01 RX ADMIN — Medication 1: at 18:17

## 2024-01-01 RX ADMIN — Medication 100 MILLIGRAM(S): at 12:23

## 2024-01-01 RX ADMIN — Medication 100 MILLIEQUIVALENT(S): at 12:24

## 2024-01-01 RX ADMIN — SODIUM CHLORIDE 4 MILLILITER(S): 9 INJECTION INTRAMUSCULAR; INTRAVENOUS; SUBCUTANEOUS at 05:29

## 2024-01-01 RX ADMIN — METOPROLOL TARTRATE 12.5 MILLIGRAM(S): 100 TABLET ORAL at 18:55

## 2024-01-01 RX ADMIN — CHLORHEXIDINE GLUCONATE 15 MILLILITER(S): 40 SOLUTION TOPICAL at 05:57

## 2024-01-01 RX ADMIN — MIDODRINE HYDROCHLORIDE 20 MILLIGRAM(S): 5 TABLET ORAL at 05:56

## 2024-01-01 RX ADMIN — Medication 200 GRAM(S): at 07:10

## 2024-01-01 RX ADMIN — MIDODRINE HYDROCHLORIDE 30 MILLIGRAM(S): 5 TABLET ORAL at 23:06

## 2024-01-01 RX ADMIN — PIPERACILLIN SODIUM AND TAZOBACTAM SODIUM 25 GRAM(S): 3; .375 INJECTION, POWDER, FOR SOLUTION INTRAVENOUS at 05:11

## 2024-01-01 RX ADMIN — CLOPIDOGREL BISULFATE 75 MILLIGRAM(S): 75 TABLET, FILM COATED ORAL at 11:20

## 2024-01-01 RX ADMIN — ALBUMIN (HUMAN) 50 MILLILITER(S): 5 SOLUTION INTRAVENOUS at 04:21

## 2024-01-01 RX ADMIN — SODIUM BICARBONATE 650 MILLIGRAM(S): 84 INJECTION, SOLUTION INTRAVENOUS at 13:37

## 2024-01-01 RX ADMIN — MIDODRINE HYDROCHLORIDE 10 MILLIGRAM(S): 5 TABLET ORAL at 13:55

## 2024-01-01 RX ADMIN — BUMETANIDE 1 MILLIGRAM(S): 2 TABLET ORAL at 17:22

## 2024-01-01 RX ADMIN — Medication 100 MILLIEQUIVALENT(S): at 02:26

## 2024-01-01 RX ADMIN — Medication 40 MILLIEQUIVALENT(S): at 14:24

## 2024-01-01 RX ADMIN — MIDODRINE HYDROCHLORIDE 30 MILLIGRAM(S): 5 TABLET ORAL at 05:53

## 2024-01-01 RX ADMIN — MIDODRINE HYDROCHLORIDE 10 MILLIGRAM(S): 5 TABLET ORAL at 13:26

## 2024-01-01 RX ADMIN — PROPOFOL 11.8 MICROGRAM(S)/KG/MIN: 10 INJECTION, EMULSION INTRAVENOUS at 03:05

## 2024-01-01 RX ADMIN — SODIUM CHLORIDE 4 MILLILITER(S): 9 INJECTION INTRAMUSCULAR; INTRAVENOUS; SUBCUTANEOUS at 05:41

## 2024-01-01 RX ADMIN — Medication 40 MILLIEQUIVALENT(S): at 15:06

## 2024-01-01 RX ADMIN — CHLORHEXIDINE GLUCONATE 1 APPLICATION(S): 40 SOLUTION TOPICAL at 05:11

## 2024-01-01 RX ADMIN — AMOXICILLIN AND CLAVULANATE POTASSIUM 1 TABLET(S): 500; 125 TABLET, FILM COATED ORAL at 22:43

## 2024-01-01 RX ADMIN — Medication 400 MILLIGRAM(S): at 12:22

## 2024-01-01 RX ADMIN — Medication 125 MILLIGRAM(S): at 17:48

## 2024-01-01 RX ADMIN — Medication 100 MILLIGRAM(S): at 15:21

## 2024-01-01 RX ADMIN — CANGRELOR 22.2 MICROGRAM(S)/KG/MIN: 50 INJECTION, POWDER, LYOPHILIZED, FOR SOLUTION INTRAVENOUS at 05:31

## 2024-01-01 RX ADMIN — METOPROLOL TARTRATE 12.5 MILLIGRAM(S): 100 TABLET ORAL at 05:16

## 2024-01-01 RX ADMIN — Medication 1 APPLICATION(S): at 05:37

## 2024-01-01 RX ADMIN — Medication 100 MILLIGRAM(S): at 05:10

## 2024-01-01 RX ADMIN — IPRATROPIUM BROMIDE AND ALBUTEROL SULFATE 3 MILLILITER(S): .5; 3 SOLUTION RESPIRATORY (INHALATION) at 00:50

## 2024-01-01 RX ADMIN — Medication 9.25 MICROGRAM(S)/KG/MIN: at 05:52

## 2024-01-01 RX ADMIN — MIDODRINE HYDROCHLORIDE 10 MILLIGRAM(S): 5 TABLET ORAL at 21:15

## 2024-01-01 RX ADMIN — OXYCODONE HYDROCHLORIDE 400 MILLIGRAM(S): 15 TABLET ORAL at 05:57

## 2024-01-01 RX ADMIN — HYDROCORTISONE 50 MILLIGRAM(S): 10 TABLET ORAL at 05:26

## 2024-01-01 RX ADMIN — Medication 100 MILLIGRAM(S): at 05:02

## 2024-01-01 RX ADMIN — OCTREOTIDE ACETATE 250 MICROGRAM(S): 100 INJECTION, SOLUTION INTRAVENOUS; SUBCUTANEOUS at 13:26

## 2024-01-01 RX ADMIN — Medication 100 MILLIGRAM(S): at 05:28

## 2024-01-01 RX ADMIN — Medication 75 MILLILITER(S): at 17:55

## 2024-01-01 RX ADMIN — SODIUM BICARBONATE 1950 MILLIGRAM(S): 84 INJECTION, SOLUTION INTRAVENOUS at 13:04

## 2024-01-01 RX ADMIN — ACETAMINOPHEN 1000 MILLIGRAM(S): 325 TABLET ORAL at 19:16

## 2024-01-01 RX ADMIN — METOPROLOL TARTRATE 5 MILLIGRAM(S): 100 TABLET ORAL at 17:15

## 2024-01-01 RX ADMIN — GUAIFENESIN 200 MILLIGRAM(S): 100 LIQUID ORAL at 05:57

## 2024-01-01 RX ADMIN — CEFEPIME 100 MILLIGRAM(S): 2 INJECTION, POWDER, FOR SOLUTION INTRAVENOUS at 05:33

## 2024-01-01 RX ADMIN — OXYCODONE HYDROCHLORIDE 200 MILLIGRAM(S): 15 TABLET ORAL at 05:00

## 2024-01-01 RX ADMIN — HYDROCORTISONE 50 MILLIGRAM(S): 10 TABLET ORAL at 03:36

## 2024-01-01 RX ADMIN — Medication 40 MILLIEQUIVALENT(S): at 08:51

## 2024-01-01 RX ADMIN — Medication 1: at 17:05

## 2024-01-01 RX ADMIN — SODIUM CHLORIDE 4 MILLILITER(S): 9 INJECTION INTRAMUSCULAR; INTRAVENOUS; SUBCUTANEOUS at 18:44

## 2024-01-01 RX ADMIN — MIDODRINE HYDROCHLORIDE 15 MILLIGRAM(S): 5 TABLET ORAL at 11:27

## 2024-01-01 RX ADMIN — Medication 0.6 MILLIGRAM(S): at 12:06

## 2024-01-01 RX ADMIN — Medication 200 GRAM(S): at 13:16

## 2024-01-01 RX ADMIN — MEROPENEM 100 MILLIGRAM(S): 500 INJECTION, POWDER, FOR SOLUTION INTRAVENOUS at 15:09

## 2024-01-01 RX ADMIN — MIDODRINE HYDROCHLORIDE 30 MILLIGRAM(S): 5 TABLET ORAL at 05:01

## 2024-01-01 RX ADMIN — BUMETANIDE 2 MILLIGRAM(S): 2 TABLET ORAL at 14:34

## 2024-01-01 RX ADMIN — ACETAMINOPHEN 400 MILLIGRAM(S): 325 TABLET ORAL at 11:35

## 2024-01-01 RX ADMIN — Medication 100 MILLIGRAM(S): at 12:17

## 2024-01-01 RX ADMIN — MIDODRINE HYDROCHLORIDE 10 MILLIGRAM(S): 5 TABLET ORAL at 21:54

## 2024-01-01 RX ADMIN — GUAIFENESIN 300 MILLIGRAM(S): 100 LIQUID ORAL at 12:06

## 2024-01-01 RX ADMIN — SODIUM CHLORIDE 4 MILLILITER(S): 9 INJECTION INTRAMUSCULAR; INTRAVENOUS; SUBCUTANEOUS at 18:18

## 2024-01-01 RX ADMIN — MIDODRINE HYDROCHLORIDE 10 MILLIGRAM(S): 5 TABLET ORAL at 05:16

## 2024-01-01 RX ADMIN — Medication 1: at 11:34

## 2024-01-01 RX ADMIN — MIDODRINE HYDROCHLORIDE 20 MILLIGRAM(S): 5 TABLET ORAL at 12:09

## 2024-01-01 RX ADMIN — Medication 40 MILLIGRAM(S): at 17:31

## 2024-01-01 RX ADMIN — DEXMEDETOMIDINE HYDROCHLORIDE IN 0.9% SODIUM CHLORIDE 7.4 MICROGRAM(S)/KG/HR: 4 INJECTION INTRAVENOUS at 20:51

## 2024-01-01 RX ADMIN — PHENYLEPHRINE HYDROCHLORIDE 9.25 MICROGRAM(S)/KG/MIN: 10 INJECTION INTRAVENOUS at 09:55

## 2024-01-01 RX ADMIN — Medication 40 MILLIGRAM(S): at 05:26

## 2024-01-01 RX ADMIN — Medication 100 MILLIEQUIVALENT(S): at 22:02

## 2024-01-01 RX ADMIN — Medication 100 GRAM(S): at 15:06

## 2024-01-01 RX ADMIN — METOPROLOL TARTRATE 5 MILLIGRAM(S): 100 TABLET ORAL at 05:17

## 2024-01-01 RX ADMIN — PETROLATUM 1 APPLICATION(S): 865 OINTMENT TOPICAL at 08:09

## 2024-01-01 RX ADMIN — PROPOFOL 11.8 MICROGRAM(S)/KG/MIN: 10 INJECTION, EMULSION INTRAVENOUS at 12:27

## 2024-01-01 RX ADMIN — BUMETANIDE 2 MILLIGRAM(S): 2 TABLET ORAL at 11:12

## 2024-01-01 RX ADMIN — Medication 1 APPLICATION(S): at 10:57

## 2024-01-01 RX ADMIN — Medication 125 MILLIGRAM(S): at 12:00

## 2024-01-01 RX ADMIN — Medication 40 MILLIEQUIVALENT(S): at 21:11

## 2024-01-01 RX ADMIN — Medication 100 MILLIGRAM(S): at 05:36

## 2024-01-01 RX ADMIN — Medication 100 MILLIEQUIVALENT(S): at 11:00

## 2024-01-01 RX ADMIN — Medication 1 MILLIGRAM(S): at 12:16

## 2024-01-01 RX ADMIN — OCTREOTIDE ACETATE 250 MICROGRAM(S): 100 INJECTION, SOLUTION INTRAVENOUS; SUBCUTANEOUS at 22:33

## 2024-01-01 RX ADMIN — HYDROCORTISONE 50 MILLIGRAM(S): 10 TABLET ORAL at 23:09

## 2024-01-01 RX ADMIN — Medication 40 MILLIGRAM(S): at 17:21

## 2024-01-01 RX ADMIN — MIDODRINE HYDROCHLORIDE 15 MILLIGRAM(S): 5 TABLET ORAL at 11:53

## 2024-01-01 RX ADMIN — Medication 20 MILLIEQUIVALENT(S): at 00:57

## 2024-01-01 RX ADMIN — MIDODRINE HYDROCHLORIDE 15 MILLIGRAM(S): 5 TABLET ORAL at 11:31

## 2024-01-01 RX ADMIN — MIDODRINE HYDROCHLORIDE 20 MILLIGRAM(S): 5 TABLET ORAL at 21:50

## 2024-01-01 RX ADMIN — IPRATROPIUM BROMIDE AND ALBUTEROL SULFATE 3 MILLILITER(S): .5; 3 SOLUTION RESPIRATORY (INHALATION) at 12:33

## 2024-01-01 RX ADMIN — CEFEPIME 100 MILLIGRAM(S): 2 INJECTION, POWDER, FOR SOLUTION INTRAVENOUS at 13:58

## 2024-01-01 RX ADMIN — Medication 100 MILLIEQUIVALENT(S): at 14:36

## 2024-01-01 RX ADMIN — DIGOXIN 62.5 MICROGRAM(S): 0.12 TABLET ORAL at 12:22

## 2024-01-01 RX ADMIN — MIDODRINE HYDROCHLORIDE 20 MILLIGRAM(S): 5 TABLET ORAL at 13:00

## 2024-01-01 RX ADMIN — ACETAMINOPHEN 1000 MILLIGRAM(S): 325 TABLET ORAL at 17:21

## 2024-01-01 RX ADMIN — Medication 100 MILLIGRAM(S): at 11:54

## 2024-01-01 RX ADMIN — DIGOXIN 62.5 MICROGRAM(S): 0.12 TABLET ORAL at 12:57

## 2024-01-01 RX ADMIN — Medication 3: at 12:19

## 2024-01-01 RX ADMIN — OCTREOTIDE ACETATE 250 MICROGRAM(S): 100 INJECTION, SOLUTION INTRAVENOUS; SUBCUTANEOUS at 05:34

## 2024-01-01 RX ADMIN — PIPERACILLIN SODIUM AND TAZOBACTAM SODIUM 25 GRAM(S): 3; .375 INJECTION, POWDER, FOR SOLUTION INTRAVENOUS at 05:33

## 2024-01-01 RX ADMIN — Medication 100 MILLIGRAM(S): at 06:30

## 2024-01-01 RX ADMIN — Medication 500 MILLIGRAM(S): at 12:20

## 2024-01-01 RX ADMIN — ACETAMINOPHEN 1000 MILLIGRAM(S): 325 TABLET ORAL at 11:00

## 2024-01-01 RX ADMIN — IPRATROPIUM BROMIDE AND ALBUTEROL SULFATE 3 MILLILITER(S): .5; 3 SOLUTION RESPIRATORY (INHALATION) at 18:58

## 2024-01-01 RX ADMIN — CHLORHEXIDINE GLUCONATE 1 APPLICATION(S): 40 SOLUTION TOPICAL at 05:01

## 2024-01-01 RX ADMIN — ATORVASTATIN CALCIUM 80 MILLIGRAM(S): 80 TABLET, FILM COATED ORAL at 21:15

## 2024-01-01 RX ADMIN — BUMETANIDE 2 MILLIGRAM(S): 2 TABLET ORAL at 17:32

## 2024-01-01 RX ADMIN — HYDROCORTISONE 50 MILLIGRAM(S): 10 TABLET ORAL at 05:13

## 2024-01-01 RX ADMIN — OXYCODONE HYDROCHLORIDE 16.7 MG/MIN: 15 TABLET ORAL at 16:00

## 2024-01-01 RX ADMIN — APIXABAN 5 MILLIGRAM(S): 2.5 TABLET, FILM COATED ORAL at 17:25

## 2024-01-01 RX ADMIN — OCTREOTIDE ACETATE 250 MICROGRAM(S): 100 INJECTION, SOLUTION INTRAVENOUS; SUBCUTANEOUS at 21:53

## 2024-01-01 RX ADMIN — Medication 125 MILLIGRAM(S): at 00:55

## 2024-01-01 RX ADMIN — HYDROCORTISONE 50 MILLIGRAM(S): 10 TABLET ORAL at 15:22

## 2024-01-01 RX ADMIN — Medication 9.25 MICROGRAM(S)/KG/MIN: at 19:30

## 2024-01-01 RX ADMIN — OCTREOTIDE ACETATE 250 MICROGRAM(S): 100 INJECTION, SOLUTION INTRAVENOUS; SUBCUTANEOUS at 21:16

## 2024-01-01 RX ADMIN — Medication 125 MILLIGRAM(S): at 05:58

## 2024-01-01 RX ADMIN — APIXABAN 5 MILLIGRAM(S): 2.5 TABLET, FILM COATED ORAL at 05:51

## 2024-01-01 RX ADMIN — Medication 100 GRAM(S): at 11:31

## 2024-01-01 RX ADMIN — PETROLATUM 1 APPLICATION(S): 865 OINTMENT TOPICAL at 17:11

## 2024-01-01 RX ADMIN — Medication 500 MILLILITER(S): at 11:26

## 2024-01-01 RX ADMIN — Medication 100 MILLIEQUIVALENT(S): at 12:51

## 2024-01-01 RX ADMIN — MEROPENEM 100 MILLIGRAM(S): 500 INJECTION, POWDER, FOR SOLUTION INTRAVENOUS at 21:31

## 2024-01-01 RX ADMIN — Medication 1: at 17:28

## 2024-01-01 RX ADMIN — Medication 1000 MILLILITER(S): at 09:36

## 2024-01-01 RX ADMIN — EZETIMIBE 10 MILLIGRAM(S): 10 TABLET ORAL at 22:44

## 2024-01-01 RX ADMIN — OXYCODONE HYDROCHLORIDE 400 MILLIGRAM(S): 15 TABLET ORAL at 22:59

## 2024-01-01 RX ADMIN — Medication 500 MILLILITER(S): at 12:48

## 2024-01-01 RX ADMIN — SODIUM BICARBONATE 1300 MILLIGRAM(S): 84 INJECTION, SOLUTION INTRAVENOUS at 13:32

## 2024-01-01 RX ADMIN — Medication 40 MILLIEQUIVALENT(S): at 01:41

## 2024-01-01 RX ADMIN — PETROLATUM 1 APPLICATION(S): 865 OINTMENT TOPICAL at 05:37

## 2024-01-01 RX ADMIN — Medication 1 TABLET(S): at 12:25

## 2024-01-01 RX ADMIN — FOLIC ACID 1 MILLIGRAM(S): 1 TABLET ORAL at 12:25

## 2024-01-01 RX ADMIN — APIXABAN 5 MILLIGRAM(S): 5 TABLET, FILM COATED ORAL at 18:03

## 2024-01-01 RX ADMIN — MIDODRINE HYDROCHLORIDE 30 MILLIGRAM(S): 5 TABLET ORAL at 05:26

## 2024-01-01 RX ADMIN — Medication 1 MILLIGRAM(S): at 10:35

## 2024-01-01 RX ADMIN — SODIUM CHLORIDE 4 MILLILITER(S): 9 INJECTION INTRAMUSCULAR; INTRAVENOUS; SUBCUTANEOUS at 17:34

## 2024-01-01 RX ADMIN — VASOPRESSIN 6 UNIT(S)/MIN: 20 INJECTION INTRAVENOUS at 21:30

## 2024-01-01 RX ADMIN — SOTALOL HYDROCHLORIDE 40 MILLIGRAM(S): 120 TABLET ORAL at 06:29

## 2024-01-01 RX ADMIN — CEFEPIME 100 MILLIGRAM(S): 2 INJECTION, POWDER, FOR SOLUTION INTRAVENOUS at 22:48

## 2024-01-01 RX ADMIN — IPRATROPIUM BROMIDE AND ALBUTEROL SULFATE 3 MILLILITER(S): .5; 3 SOLUTION RESPIRATORY (INHALATION) at 11:15

## 2024-01-01 RX ADMIN — Medication 1 APPLICATION(S): at 17:05

## 2024-01-01 RX ADMIN — BUMETANIDE 2 MILLIGRAM(S): 2 TABLET ORAL at 11:45

## 2024-01-01 RX ADMIN — MEROPENEM 100 MILLIGRAM(S): 500 INJECTION, POWDER, FOR SOLUTION INTRAVENOUS at 21:07

## 2024-01-01 RX ADMIN — ALBUMIN (HUMAN) 250 MILLILITER(S): 5 SOLUTION INTRAVENOUS at 01:41

## 2024-01-01 RX ADMIN — GUAIFENESIN 300 MILLIGRAM(S): 100 LIQUID ORAL at 23:37

## 2024-01-01 RX ADMIN — Medication 2 MILLIGRAM(S): at 22:58

## 2024-01-01 RX ADMIN — Medication 100 MILLIGRAM(S): at 17:04

## 2024-01-01 RX ADMIN — Medication 100 MILLIGRAM(S): at 11:18

## 2024-01-01 RX ADMIN — Medication 25 GRAM(S): at 02:14

## 2024-01-01 RX ADMIN — Medication 1 TABLET(S): at 12:34

## 2024-01-01 RX ADMIN — Medication 1 APPLICATION(S): at 05:57

## 2024-01-01 RX ADMIN — Medication 40 MILLIGRAM(S): at 05:01

## 2024-01-01 RX ADMIN — CEFEPIME 100 MILLIGRAM(S): 2 INJECTION, POWDER, FOR SOLUTION INTRAVENOUS at 22:20

## 2024-01-01 RX ADMIN — OCTREOTIDE ACETATE 250 MICROGRAM(S): 100 INJECTION, SOLUTION INTRAVENOUS; SUBCUTANEOUS at 05:36

## 2024-01-01 RX ADMIN — OCTREOTIDE ACETATE 250 MICROGRAM(S): 100 INJECTION, SOLUTION INTRAVENOUS; SUBCUTANEOUS at 22:00

## 2024-01-01 RX ADMIN — GUAIFENESIN 300 MILLIGRAM(S): 100 LIQUID ORAL at 06:33

## 2024-01-01 RX ADMIN — CHLORHEXIDINE GLUCONATE 15 MILLILITER(S): 40 SOLUTION TOPICAL at 05:34

## 2024-01-01 RX ADMIN — Medication 125 MILLIGRAM(S): at 17:08

## 2024-01-01 RX ADMIN — SODIUM BICARBONATE 650 MILLIGRAM(S): 84 INJECTION, SOLUTION INTRAVENOUS at 21:01

## 2024-01-03 ENCOUNTER — LABORATORY RESULT (OUTPATIENT)
Age: 79
End: 2024-01-03

## 2024-01-04 ENCOUNTER — LABORATORY RESULT (OUTPATIENT)
Age: 79
End: 2024-01-04

## 2024-01-08 ENCOUNTER — APPOINTMENT (OUTPATIENT)
Dept: CARDIOLOGY | Facility: CLINIC | Age: 79
End: 2024-01-08
Payer: MEDICARE

## 2024-01-08 VITALS
SYSTOLIC BLOOD PRESSURE: 102 MMHG | OXYGEN SATURATION: 99 % | RESPIRATION RATE: 18 BRPM | BODY MASS INDEX: 30.93 KG/M2 | WEIGHT: 241 LBS | DIASTOLIC BLOOD PRESSURE: 74 MMHG | HEART RATE: 98 BPM | TEMPERATURE: 98.2 F | HEIGHT: 74 IN

## 2024-01-08 DIAGNOSIS — R26.2 DIFFICULTY IN WALKING, NOT ELSEWHERE CLASSIFIED: ICD-10-CM

## 2024-01-08 PROCEDURE — 93000 ELECTROCARDIOGRAM COMPLETE: CPT

## 2024-01-08 PROCEDURE — 99214 OFFICE O/P EST MOD 30 MIN: CPT

## 2024-01-09 LAB
BASOPHILS # BLD AUTO: 0.04 K/UL
BASOPHILS NFR BLD AUTO: 0.7 %
EOSINOPHIL # BLD AUTO: 0.23 K/UL
EOSINOPHIL NFR BLD AUTO: 4.1 %
HCT VFR BLD CALC: 35.6 %
HGB BLD-MCNC: 11.5 G/DL
IMM GRANULOCYTES NFR BLD AUTO: 0.4 %
LYMPHOCYTES # BLD AUTO: 1.6 K/UL
LYMPHOCYTES NFR BLD AUTO: 28.3 %
MAN DIFF?: NORMAL
MCHC RBC-ENTMCNC: 31.8 PG
MCHC RBC-ENTMCNC: 32.3 GM/DL
MCV RBC AUTO: 98.3 FL
MONOCYTES # BLD AUTO: 0.78 K/UL
MONOCYTES NFR BLD AUTO: 13.8 %
NEUTROPHILS # BLD AUTO: 2.98 K/UL
NEUTROPHILS NFR BLD AUTO: 52.7 %
PLATELET # BLD AUTO: 213 K/UL
RBC # BLD: 3.62 M/UL
RBC # FLD: 17.4 %
WBC # FLD AUTO: 5.65 K/UL

## 2024-01-29 ENCOUNTER — APPOINTMENT (OUTPATIENT)
Dept: CARDIOLOGY | Facility: CLINIC | Age: 79
End: 2024-01-29

## 2024-01-29 ENCOUNTER — APPOINTMENT (OUTPATIENT)
Dept: CARDIOLOGY | Facility: CLINIC | Age: 79
End: 2024-01-29
Payer: MEDICARE

## 2024-01-29 ENCOUNTER — RX RENEWAL (OUTPATIENT)
Age: 79
End: 2024-01-29

## 2024-01-29 VITALS
WEIGHT: 246 LBS | HEIGHT: 74 IN | SYSTOLIC BLOOD PRESSURE: 110 MMHG | DIASTOLIC BLOOD PRESSURE: 72 MMHG | HEART RATE: 71 BPM | BODY MASS INDEX: 31.57 KG/M2 | RESPIRATION RATE: 18 BRPM | TEMPERATURE: 98.2 F | OXYGEN SATURATION: 97 %

## 2024-01-29 DIAGNOSIS — R60.0 LOCALIZED EDEMA: ICD-10-CM

## 2024-01-29 PROCEDURE — 99214 OFFICE O/P EST MOD 30 MIN: CPT

## 2024-01-29 PROCEDURE — 93000 ELECTROCARDIOGRAM COMPLETE: CPT

## 2024-01-29 NOTE — PHYSICAL EXAM
[Well Developed] : well developed [Well Nourished] : well nourished [No Acute Distress] : no acute distress [Obese] : obese [Normal Conjunctiva] : normal conjunctiva [Normal Venous Pressure] : normal venous pressure [No Carotid Bruit] : no carotid bruit [Normal S1, S2] : normal S1, S2 [No Murmur] : no murmur [No Rub] : no rub [No Gallop] : no gallop [Clear Lung Fields] : clear lung fields [Good Air Entry] : good air entry [No Respiratory Distress] : no respiratory distress  [Soft] : abdomen soft [Non Tender] : non-tender [No Masses/organomegaly] : no masses/organomegaly [Normal Bowel Sounds] : normal bowel sounds [Normal Gait] : normal gait [No Cyanosis] : no cyanosis [No Clubbing] : no clubbing [No Varicosities] : no varicosities [No Rash] : no rash [No Skin Lesions] : no skin lesions [Moves all extremities] : moves all extremities [No Focal Deficits] : no focal deficits [Normal Speech] : normal speech [Alert and Oriented] : alert and oriented [Normal memory] : normal memory [Edema ___] : edema [unfilled]

## 2024-01-30 NOTE — HISTORY OF PRESENT ILLNESS
[FreeTextEntry1] : NANDO is a 78 year M w/MHx of Neuroendocrine Ca of Liver, CAD s/p NURIA, PVD, ED, Afib, Dilated aorta, CKD, HLD, HTN, HoTN, T2DM, Gout, Hypogonadism, Obesity who presents for follow up. At last visit Midodrine was increased to 10 mg i tab BID. Denies ADR. States swelling stable. Denies chest pain, palpitations, diaphoresis, vision changes, HA, dizziness, syncope, cough, wheezing, SOB/RAINES, fever, chills, infection. pt with persistent fatigue

## 2024-02-02 ENCOUNTER — APPOINTMENT (OUTPATIENT)
Dept: OPHTHALMOLOGY | Facility: CLINIC | Age: 79
End: 2024-02-02

## 2024-02-11 ENCOUNTER — RX RENEWAL (OUTPATIENT)
Age: 79
End: 2024-02-11

## 2024-02-18 ENCOUNTER — RX RENEWAL (OUTPATIENT)
Age: 79
End: 2024-02-18

## 2024-02-20 ENCOUNTER — RX RENEWAL (OUTPATIENT)
Age: 79
End: 2024-02-20

## 2024-03-11 ENCOUNTER — APPOINTMENT (OUTPATIENT)
Dept: CARDIOLOGY | Facility: CLINIC | Age: 79
End: 2024-03-11
Payer: MEDICARE

## 2024-03-11 VITALS
TEMPERATURE: 97.9 F | DIASTOLIC BLOOD PRESSURE: 60 MMHG | HEART RATE: 75 BPM | BODY MASS INDEX: 31.44 KG/M2 | WEIGHT: 245 LBS | HEIGHT: 74 IN | SYSTOLIC BLOOD PRESSURE: 98 MMHG | OXYGEN SATURATION: 99 %

## 2024-03-11 DIAGNOSIS — R89.9 UNSPECIFIED ABNORMAL FINDING IN SPECIMENS FROM OTHER ORGANS, SYSTEMS AND TISSUES: ICD-10-CM

## 2024-03-11 PROCEDURE — 99214 OFFICE O/P EST MOD 30 MIN: CPT

## 2024-03-11 PROCEDURE — G2211 COMPLEX E/M VISIT ADD ON: CPT

## 2024-03-11 PROCEDURE — 93000 ELECTROCARDIOGRAM COMPLETE: CPT

## 2024-03-11 RX ORDER — MIDODRINE HYDROCHLORIDE 5 MG/1
5 TABLET ORAL
Qty: 90 | Refills: 1 | Status: DISCONTINUED | COMMUNITY
Start: 2024-02-11 | End: 2024-03-11

## 2024-03-11 NOTE — HISTORY OF PRESENT ILLNESS
[FreeTextEntry1] : This is a 79 y/o male with a pmhx of Neuroendocrine Ca of Liver, CAD s/p NURIA, PVD, ED, Afib, Dilated aorta, CKD, HLD, HTN, HoTN, T2DM, Gout, Hypogonadism, Obesity who presents for follow up. Patient was last seen 1/29/24 and midodrine was increased to 10 mg po TID. He reports he is taking 10 q am q pm but taking 5 mg in the afternoon. He continues to reports dyspnea. He reports his LE edema is improved.  Patient denies chest pain,  palpitations, dizziness, syncope, changes in bowel/bladder habits or appetite.

## 2024-03-11 NOTE — PHYSICAL EXAM
[Well Nourished] : well nourished [Well Developed] : well developed [No Acute Distress] : no acute distress [Normal Conjunctiva] : normal conjunctiva [Normal Venous Pressure] : normal venous pressure [No Carotid Bruit] : no carotid bruit [Normal S1, S2] : normal S1, S2 [No Murmur] : no murmur [No Gallop] : no gallop [No Rub] : no rub [Clear Lung Fields] : clear lung fields [Good Air Entry] : good air entry [No Respiratory Distress] : no respiratory distress  [Non Tender] : non-tender [Soft] : abdomen soft [No Masses/organomegaly] : no masses/organomegaly [Normal Gait] : normal gait [Normal Bowel Sounds] : normal bowel sounds [No Edema] : no edema [No Cyanosis] : no cyanosis [No Clubbing] : no clubbing [No Varicosities] : no varicosities [No Rash] : no rash [No Skin Lesions] : no skin lesions [Moves all extremities] : moves all extremities [No Focal Deficits] : no focal deficits [Normal Speech] : normal speech [Alert and Oriented] : alert and oriented [Normal memory] : normal memory

## 2024-03-14 PROBLEM — R89.9 ABNORMAL LABORATORY TEST: Status: ACTIVE | Noted: 2021-06-01

## 2024-03-14 LAB
ALBUMIN SERPL ELPH-MCNC: 4.3 G/DL
ALP BLD-CCNC: 125 U/L
ALT SERPL-CCNC: 36 U/L
ANION GAP SERPL CALC-SCNC: 13 MMOL/L
APPEARANCE: CLEAR
AST SERPL-CCNC: 42 U/L
BACTERIA: NEGATIVE /HPF
BASOPHILS # BLD AUTO: 0.03 K/UL
BASOPHILS NFR BLD AUTO: 0.4 %
BILIRUB DIRECT SERPL-MCNC: 0.2 MG/DL
BILIRUB INDIRECT SERPL-MCNC: 0.2 MG/DL
BILIRUB SERPL-MCNC: 0.4 MG/DL
BILIRUBIN URINE: NEGATIVE
BLOOD URINE: NEGATIVE
BUN SERPL-MCNC: 29 MG/DL
CALCIUM SERPL-MCNC: 9.9 MG/DL
CAST: 0 /LPF
CHLORIDE SERPL-SCNC: 103 MMOL/L
CHOLEST SERPL-MCNC: 137 MG/DL
CK SERPL-CCNC: 75 U/L
CO2 SERPL-SCNC: 26 MMOL/L
COLOR: YELLOW
CREAT SERPL-MCNC: 1.4 MG/DL
EGFR: 51 ML/MIN/1.73M2
EOSINOPHIL # BLD AUTO: 0.25 K/UL
EOSINOPHIL NFR BLD AUTO: 3.4 %
EPITHELIAL CELLS: 1 /HPF
ESTIMATED AVERAGE GLUCOSE: 126 MG/DL
FERRITIN SERPL-MCNC: 268 NG/ML
FOLATE SERPL-MCNC: >20 NG/ML
GLUCOSE QUALITATIVE U: NEGATIVE MG/DL
GLUCOSE SERPL-MCNC: 100 MG/DL
HBA1C MFR BLD HPLC: 6 %
HCT VFR BLD CALC: 39.9 %
HDLC SERPL-MCNC: 46 MG/DL
HGB BLD-MCNC: 13 G/DL
IMM GRANULOCYTES NFR BLD AUTO: 0.3 %
IRON SATN MFR SERPL: 25 %
IRON SERPL-MCNC: 77 UG/DL
KETONES URINE: NEGATIVE MG/DL
LDLC SERPL CALC-MCNC: 73 MG/DL
LEUKOCYTE ESTERASE URINE: NEGATIVE
LYMPHOCYTES # BLD AUTO: 1.52 K/UL
LYMPHOCYTES NFR BLD AUTO: 20.6 %
MAGNESIUM SERPL-MCNC: 2.2 MG/DL
MAN DIFF?: NORMAL
MCHC RBC-ENTMCNC: 32.2 PG
MCHC RBC-ENTMCNC: 32.6 GM/DL
MCV RBC AUTO: 98.8 FL
MICROSCOPIC-UA: NORMAL
MONOCYTES # BLD AUTO: 0.86 K/UL
MONOCYTES NFR BLD AUTO: 11.7 %
NEUTROPHILS # BLD AUTO: 4.69 K/UL
NEUTROPHILS NFR BLD AUTO: 63.6 %
NITRITE URINE: NEGATIVE
NONHDLC SERPL-MCNC: 91 MG/DL
NT-PROBNP SERPL-MCNC: 3109 PG/ML
PH URINE: 6.5
PLATELET # BLD AUTO: 220 K/UL
POTASSIUM SERPL-SCNC: 4.2 MMOL/L
PROT SERPL-MCNC: 7.2 G/DL
PROTEIN URINE: NORMAL MG/DL
RBC # BLD: 4.04 M/UL
RBC # FLD: 15.2 %
RED BLOOD CELLS URINE: 0 /HPF
SODIUM SERPL-SCNC: 141 MMOL/L
SPECIFIC GRAVITY URINE: 1.01
T4 FREE SERPL-MCNC: 1.3 NG/DL
TIBC SERPL-MCNC: 306 UG/DL
TRIGL SERPL-MCNC: 93 MG/DL
TSH SERPL-ACNC: 2.58 UIU/ML
UIBC SERPL-MCNC: 229 UG/DL
UROBILINOGEN URINE: 0.2 MG/DL
VIT B12 SERPL-MCNC: 731 PG/ML
WBC # FLD AUTO: 7.37 K/UL
WHITE BLOOD CELLS URINE: 1 /HPF

## 2024-03-18 LAB
ALBUMIN SERPL ELPH-MCNC: 3.8 G/DL
ALBUMIN SERPL ELPH-MCNC: 4 G/DL
ALP BLD-CCNC: 111 U/L
ALP BLD-CCNC: 127 U/L
ALT SERPL-CCNC: 11 U/L
ALT SERPL-CCNC: 23 U/L
ANION GAP SERPL CALC-SCNC: 13 MMOL/L
ANION GAP SERPL CALC-SCNC: 13 MMOL/L
ANION GAP SERPL CALC-SCNC: 14 MMOL/L
AST SERPL-CCNC: 27 U/L
AST SERPL-CCNC: 28 U/L
BILIRUB DIRECT SERPL-MCNC: 0.1 MG/DL
BILIRUB INDIRECT SERPL-MCNC: 0.2 MG/DL
BILIRUB SERPL-MCNC: 0.3 MG/DL
BILIRUB SERPL-MCNC: 0.3 MG/DL
BUN SERPL-MCNC: 19 MG/DL
BUN SERPL-MCNC: 24 MG/DL
BUN SERPL-MCNC: 26 MG/DL
BUN SERPL-MCNC: 28 MG/DL
BUN SERPL-MCNC: 29 MG/DL
BUN SERPL-MCNC: 31 MG/DL
BUN SERPL-MCNC: 33 MG/DL
CALCIUM SERPL-MCNC: 9.3 MG/DL
CALCIUM SERPL-MCNC: 9.5 MG/DL
CALCIUM SERPL-MCNC: 9.5 MG/DL
CALCIUM SERPL-MCNC: 9.8 MG/DL
CALCIUM SERPL-MCNC: 9.9 MG/DL
CHLORIDE SERPL-SCNC: 101 MMOL/L
CHLORIDE SERPL-SCNC: 102 MMOL/L
CHLORIDE SERPL-SCNC: 102 MMOL/L
CHLORIDE SERPL-SCNC: 103 MMOL/L
CHLORIDE SERPL-SCNC: 106 MMOL/L
CHOLEST SERPL-MCNC: 112 MG/DL
CK SERPL-CCNC: 70 U/L
CO2 SERPL-SCNC: 23 MMOL/L
CO2 SERPL-SCNC: 24 MMOL/L
CO2 SERPL-SCNC: 25 MMOL/L
CO2 SERPL-SCNC: 26 MMOL/L
CORTICOSTEROID BIND GLOBULIN: 2.5 MG/DL
CORTIS SERPL-MCNC: 10 UG/DL
CORTIS SERPL-MCNC: 9.2 UG/DL
CORTISOL, FREE: 0.45 UG/DL
CREAT SERPL-MCNC: 1.08 MG/DL
CREAT SERPL-MCNC: 1.18 MG/DL
CREAT SERPL-MCNC: 1.22 MG/DL
CREAT SERPL-MCNC: 1.31 MG/DL
CREAT SERPL-MCNC: 1.38 MG/DL
CREAT SERPL-MCNC: 1.45 MG/DL
CREAT SERPL-MCNC: 1.54 MG/DL
CRP SERPL-MCNC: 28 MG/L
CRP SERPL-MCNC: 80 MG/L
DEPRECATED D DIMER PPP IA-ACNC: <150 NG/ML DDU
EGFR: 46 ML/MIN/1.73M2
EGFR: 50 ML/MIN/1.73M2
EGFR: 52 ML/MIN/1.73M2
EGFR: 56 ML/MIN/1.73M2
EGFR: 61 ML/MIN/1.73M2
EGFR: 63 ML/MIN/1.73M2
EGFR: 70 ML/MIN/1.73M2
ERYTHROCYTE [SEDIMENTATION RATE] IN BLOOD BY WESTERGREN METHOD: 39 MM/HR
ERYTHROCYTE [SEDIMENTATION RATE] IN BLOOD BY WESTERGREN METHOD: 68 MM/HR
ESTIMATED AVERAGE GLUCOSE: 157 MG/DL
FERRITIN SERPL-MCNC: 498 NG/ML
GLUCOSE SERPL-MCNC: 110 MG/DL
GLUCOSE SERPL-MCNC: 112 MG/DL
GLUCOSE SERPL-MCNC: 116 MG/DL
GLUCOSE SERPL-MCNC: 120 MG/DL
GLUCOSE SERPL-MCNC: 121 MG/DL
HAV IGM SER QL: NONREACTIVE
HBA1C MFR BLD HPLC: 7.1 %
HBV CORE IGG+IGM SER QL: REACTIVE
HBV CORE IGM SER QL: NONREACTIVE
HBV SURFACE AB SER QL: REACTIVE
HBV SURFACE AG SER QL: NONREACTIVE
HCT VFR BLD CALC: 37 %
HCT VFR BLD CALC: 40.8 %
HCV AB SER QL: NONREACTIVE
HCV S/CO RATIO: 0.08 S/CO
HDLC SERPL-MCNC: 38 MG/DL
HGB BLD-MCNC: 11.5 G/DL
HGB BLD-MCNC: 12.9 G/DL
IRON SATN MFR SERPL: 20 %
IRON SERPL-MCNC: 44 UG/DL
LDLC SERPL CALC-MCNC: 56 MG/DL
MAGNESIUM SERPL-MCNC: 1.6 MG/DL
MAGNESIUM SERPL-MCNC: 1.7 MG/DL
MCHC RBC-ENTMCNC: 29.7 PG
MCHC RBC-ENTMCNC: 30.4 PG
MCHC RBC-ENTMCNC: 31.1 GM/DL
MCHC RBC-ENTMCNC: 31.6 GM/DL
MCV RBC AUTO: 93.8 FL
MCV RBC AUTO: 97.9 FL
NONHDLC SERPL-MCNC: 74 MG/DL
NT-PROBNP SERPL-MCNC: 2110 PG/ML
NT-PROBNP SERPL-MCNC: 2385 PG/ML
NT-PROBNP SERPL-MCNC: 310 PG/ML
NT-PROBNP SERPL-MCNC: 326 PG/ML
NT-PROBNP SERPL-MCNC: 711 PG/ML
NT-PROBNP SERPL-MCNC: 743 PG/ML
PFCX: 4.5 %
PLATELET # BLD AUTO: 217 K/UL
PLATELET # BLD AUTO: 222 K/UL
POTASSIUM SERPL-SCNC: 4 MMOL/L
POTASSIUM SERPL-SCNC: 4 MMOL/L
POTASSIUM SERPL-SCNC: 4.1 MMOL/L
POTASSIUM SERPL-SCNC: 4.2 MMOL/L
POTASSIUM SERPL-SCNC: 4.2 MMOL/L
POTASSIUM SERPL-SCNC: 4.3 MMOL/L
POTASSIUM SERPL-SCNC: 4.6 MMOL/L
PROT SERPL-MCNC: 6.7 G/DL
PROT SERPL-MCNC: 7.1 G/DL
RBC # BLD: 3.78 M/UL
RBC # BLD: 4.35 M/UL
RBC # FLD: 15.1 %
RBC # FLD: 17.2 %
SODIUM SERPL-SCNC: 140 MMOL/L
SODIUM SERPL-SCNC: 140 MMOL/L
SODIUM SERPL-SCNC: 141 MMOL/L
SODIUM SERPL-SCNC: 144 MMOL/L
T4 FREE SERPL-MCNC: 1.2 NG/DL
TIBC SERPL-MCNC: 220 UG/DL
TRIGL SERPL-MCNC: 88 MG/DL
TSH SERPL-ACNC: 3.18 UIU/ML
UIBC SERPL-MCNC: 176 UG/DL
URATE SERPL-MCNC: 4.6 MG/DL
WBC # FLD AUTO: 6.63 K/UL
WBC # FLD AUTO: 7.83 K/UL

## 2024-03-21 RX ORDER — APIXABAN 5 MG/1
5 TABLET, FILM COATED ORAL
Qty: 180 | Refills: 1 | Status: ACTIVE | COMMUNITY
Start: 2023-11-10 | End: 1900-01-01

## 2024-03-28 ENCOUNTER — NON-APPOINTMENT (OUTPATIENT)
Age: 79
End: 2024-03-28

## 2024-03-29 LAB
ANION GAP SERPL CALC-SCNC: 14 MMOL/L
BUN SERPL-MCNC: 36 MG/DL
CALCIUM SERPL-MCNC: 9.9 MG/DL
CHLORIDE SERPL-SCNC: 103 MMOL/L
CO2 SERPL-SCNC: 24 MMOL/L
CREAT SERPL-MCNC: 1.51 MG/DL
EGFR: 47 ML/MIN/1.73M2
GLUCOSE SERPL-MCNC: 133 MG/DL
NT-PROBNP SERPL-MCNC: 2037 PG/ML
POTASSIUM SERPL-SCNC: 5.2 MMOL/L
SODIUM SERPL-SCNC: 142 MMOL/L

## 2024-04-08 ENCOUNTER — APPOINTMENT (OUTPATIENT)
Dept: CARDIOLOGY | Facility: CLINIC | Age: 79
End: 2024-04-08
Payer: MEDICARE

## 2024-04-08 VITALS
HEART RATE: 72 BPM | WEIGHT: 237 LBS | SYSTOLIC BLOOD PRESSURE: 98 MMHG | TEMPERATURE: 97 F | DIASTOLIC BLOOD PRESSURE: 60 MMHG | BODY MASS INDEX: 30.42 KG/M2 | OXYGEN SATURATION: 99 % | HEIGHT: 74 IN

## 2024-04-08 DIAGNOSIS — M10.9 GOUT, UNSPECIFIED: ICD-10-CM

## 2024-04-08 PROCEDURE — G2211 COMPLEX E/M VISIT ADD ON: CPT

## 2024-04-08 PROCEDURE — 99214 OFFICE O/P EST MOD 30 MIN: CPT

## 2024-04-08 RX ORDER — CLOPIDOGREL BISULFATE 75 MG/1
75 TABLET, FILM COATED ORAL
Qty: 90 | Refills: 2 | Status: ACTIVE | COMMUNITY
Start: 2023-06-29 | End: 1900-01-01

## 2024-04-08 RX ORDER — MIDODRINE HYDROCHLORIDE 10 MG/1
10 TABLET ORAL 3 TIMES DAILY
Qty: 90 | Refills: 1 | Status: COMPLETED | COMMUNITY
Start: 2023-12-19 | End: 2024-04-08

## 2024-04-08 RX ORDER — METOPROLOL SUCCINATE 25 MG/1
25 TABLET, EXTENDED RELEASE ORAL
Qty: 90 | Refills: 3 | Status: DISCONTINUED | COMMUNITY
Start: 2019-04-15 | End: 2024-04-08

## 2024-04-08 NOTE — HISTORY OF PRESENT ILLNESS
[FreeTextEntry1] : Neuroendocrine Ca of Liver, CAD s/p NURIA, PVD, ED, Afib, Dilated aorta, CKD, HLD, HTN, HoTN, T2DM, Gout, Hypogonadism, Obesity who presents for follow up. Patient was last seen in the office 3/11/24 and patient reported he was taking midodrine 25 mg po qd and he was advised to take midodrine 10 mg po TID and metoprolol decreased to 25 mg po qhs. He feels well. Patient admits to fatigue. He continues to report dyspnea on exertion.  He denies chest discomfort, palpitations, dizziness, syncope.

## 2024-04-09 LAB
ANION GAP SERPL CALC-SCNC: 16 MMOL/L
BASOPHILS # BLD AUTO: 0.03 K/UL
BASOPHILS NFR BLD AUTO: 0.5 %
BUN SERPL-MCNC: 34 MG/DL
CALCIUM SERPL-MCNC: 9.4 MG/DL
CHLORIDE SERPL-SCNC: 104 MMOL/L
CO2 SERPL-SCNC: 23 MMOL/L
CREAT SERPL-MCNC: 1.46 MG/DL
EGFR: 49 ML/MIN/1.73M2
EOSINOPHIL # BLD AUTO: 0.2 K/UL
EOSINOPHIL NFR BLD AUTO: 3.3 %
GLUCOSE SERPL-MCNC: 108 MG/DL
HCT VFR BLD CALC: 40.2 %
HGB BLD-MCNC: 12.8 G/DL
IMM GRANULOCYTES NFR BLD AUTO: 0.2 %
LYMPHOCYTES # BLD AUTO: 0.85 K/UL
LYMPHOCYTES NFR BLD AUTO: 14 %
MAN DIFF?: NORMAL
MCHC RBC-ENTMCNC: 31.7 PG
MCHC RBC-ENTMCNC: 31.8 GM/DL
MCV RBC AUTO: 99.5 FL
MONOCYTES # BLD AUTO: 0.8 K/UL
MONOCYTES NFR BLD AUTO: 13.2 %
NEUTROPHILS # BLD AUTO: 4.16 K/UL
NEUTROPHILS NFR BLD AUTO: 68.8 %
NT-PROBNP SERPL-MCNC: 2376 PG/ML
PLATELET # BLD AUTO: 215 K/UL
POTASSIUM SERPL-SCNC: 4.6 MMOL/L
RBC # BLD: 4.04 M/UL
RBC # FLD: 15 %
SODIUM SERPL-SCNC: 142 MMOL/L
WBC # FLD AUTO: 6.05 K/UL

## 2024-04-19 ENCOUNTER — APPOINTMENT (OUTPATIENT)
Dept: CARDIOLOGY | Facility: CLINIC | Age: 79
End: 2024-04-19
Payer: MEDICARE

## 2024-04-19 PROCEDURE — 93306 TTE W/DOPPLER COMPLETE: CPT

## 2024-05-10 ENCOUNTER — APPOINTMENT (OUTPATIENT)
Dept: CARDIOLOGY | Facility: CLINIC | Age: 79
End: 2024-05-10
Payer: MEDICARE

## 2024-05-10 VITALS
BODY MASS INDEX: 30.17 KG/M2 | SYSTOLIC BLOOD PRESSURE: 100 MMHG | HEART RATE: 70 BPM | DIASTOLIC BLOOD PRESSURE: 54 MMHG | HEIGHT: 74 IN | WEIGHT: 235.06 LBS | OXYGEN SATURATION: 99 % | RESPIRATION RATE: 18 BRPM | TEMPERATURE: 97.5 F

## 2024-05-10 DIAGNOSIS — R73.03 PREDIABETES.: ICD-10-CM

## 2024-05-10 DIAGNOSIS — D64.9 ANEMIA, UNSPECIFIED: ICD-10-CM

## 2024-05-10 PROCEDURE — 99214 OFFICE O/P EST MOD 30 MIN: CPT

## 2024-05-10 PROCEDURE — 93000 ELECTROCARDIOGRAM COMPLETE: CPT

## 2024-05-10 PROCEDURE — G2211 COMPLEX E/M VISIT ADD ON: CPT

## 2024-05-10 RX ORDER — POTASSIUM CHLORIDE 750 MG/1
10 TABLET, FILM COATED, EXTENDED RELEASE ORAL
Qty: 90 | Refills: 0 | Status: ACTIVE | COMMUNITY
Start: 2023-11-30 | End: 1900-01-01

## 2024-05-10 RX ORDER — METFORMIN HYDROCHLORIDE 1000 MG/1
1000 TABLET, FILM COATED, EXTENDED RELEASE ORAL
Qty: 180 | Refills: 0 | Status: DISCONTINUED | COMMUNITY
Start: 2023-06-09 | End: 2024-05-10

## 2024-05-10 NOTE — HISTORY OF PRESENT ILLNESS
[FreeTextEntry1] : This is a 77 yo male with a PMhx of Neuroendocrine Ca of Liver, CAD s/p NURIA, PVD, ED, Afib, Dilated aorta, CKD, HLD, HTN, HoTN, T2DM, Gout, Hypogonadism, Obesity who presents for follow up.  pt reoprts BP at home still low SBP 90s reports some dizziness and worsening SPB He denies chest discomfort, palpitations,, syncope. LE swelling resolved

## 2024-05-12 ENCOUNTER — RX RENEWAL (OUTPATIENT)
Age: 79
End: 2024-05-12

## 2024-05-13 ENCOUNTER — NON-APPOINTMENT (OUTPATIENT)
Age: 79
End: 2024-05-13

## 2024-05-13 LAB
ALBUMIN SERPL ELPH-MCNC: 4 G/DL
ALP BLD-CCNC: 163 U/L
ALT SERPL-CCNC: 29 U/L
ANION GAP SERPL CALC-SCNC: 16 MMOL/L
AST SERPL-CCNC: 34 U/L
BASOPHILS # BLD AUTO: 0.05 K/UL
BASOPHILS NFR BLD AUTO: 0.7 %
BILIRUB DIRECT SERPL-MCNC: 0.2 MG/DL
BILIRUB INDIRECT SERPL-MCNC: 0.3 MG/DL
BILIRUB SERPL-MCNC: 0.5 MG/DL
BUN SERPL-MCNC: 36 MG/DL
CALCIUM SERPL-MCNC: 9.7 MG/DL
CHLORIDE SERPL-SCNC: 100 MMOL/L
CO2 SERPL-SCNC: 23 MMOL/L
CREAT SERPL-MCNC: 1.52 MG/DL
EGFR: 47 ML/MIN/1.73M2
EOSINOPHIL # BLD AUTO: 0.16 K/UL
EOSINOPHIL NFR BLD AUTO: 2.4 %
FERRITIN SERPL-MCNC: 449 NG/ML
FOLATE SERPL-MCNC: >20 NG/ML
GLUCOSE SERPL-MCNC: 115 MG/DL
HCT VFR BLD CALC: 37.3 %
HGB BLD-MCNC: 11.9 G/DL
IMM GRANULOCYTES NFR BLD AUTO: 0.3 %
IRON SATN MFR SERPL: 33 %
IRON SERPL-MCNC: 79 UG/DL
LYMPHOCYTES # BLD AUTO: 1.08 K/UL
LYMPHOCYTES NFR BLD AUTO: 16.1 %
MAGNESIUM SERPL-MCNC: 2.3 MG/DL
MAN DIFF?: NORMAL
MCHC RBC-ENTMCNC: 31 PG
MCHC RBC-ENTMCNC: 31.9 GM/DL
MCV RBC AUTO: 97.1 FL
MONOCYTES # BLD AUTO: 0.74 K/UL
MONOCYTES NFR BLD AUTO: 11 %
NEUTROPHILS # BLD AUTO: 4.65 K/UL
NEUTROPHILS NFR BLD AUTO: 69.5 %
NT-PROBNP SERPL-MCNC: 2725 PG/ML
PLATELET # BLD AUTO: 282 K/UL
POTASSIUM SERPL-SCNC: 4.5 MMOL/L
PROT SERPL-MCNC: 6.5 G/DL
RBC # BLD: 3.84 M/UL
RBC # FLD: 14.7 %
SODIUM SERPL-SCNC: 139 MMOL/L
T4 FREE SERPL-MCNC: 1.3 NG/DL
TIBC SERPL-MCNC: 242 UG/DL
TRANSFERRIN SERPL-MCNC: 177 MG/DL
TSH SERPL-ACNC: 2.64 UIU/ML
UIBC SERPL-MCNC: 163 UG/DL
VIT B12 SERPL-MCNC: 917 PG/ML
WBC # FLD AUTO: 6.7 K/UL

## 2024-05-16 RX ORDER — COLCHICINE 0.6 MG/1
0.6 TABLET ORAL
Qty: 90 | Refills: 1 | Status: DISCONTINUED | COMMUNITY
Start: 2023-12-19 | End: 2024-05-16

## 2024-05-17 ENCOUNTER — TRANSCRIPTION ENCOUNTER (OUTPATIENT)
Age: 79
End: 2024-05-17

## 2024-05-20 RX ORDER — FOLIC ACID 1 MG/1
1 TABLET ORAL DAILY
Qty: 90 | Refills: 1 | Status: ACTIVE | COMMUNITY
Start: 2023-11-13 | End: 1900-01-01

## 2024-05-21 ENCOUNTER — RX RENEWAL (OUTPATIENT)
Age: 79
End: 2024-05-21

## 2024-05-24 ENCOUNTER — APPOINTMENT (OUTPATIENT)
Dept: INTERNAL MEDICINE | Facility: CLINIC | Age: 79
End: 2024-05-24
Payer: MEDICARE

## 2024-05-24 ENCOUNTER — APPOINTMENT (OUTPATIENT)
Dept: CT IMAGING | Facility: IMAGING CENTER | Age: 79
End: 2024-05-24
Payer: MEDICARE

## 2024-05-24 ENCOUNTER — OUTPATIENT (OUTPATIENT)
Dept: OUTPATIENT SERVICES | Facility: HOSPITAL | Age: 79
LOS: 1 days | End: 2024-05-24
Payer: MEDICARE

## 2024-05-24 ENCOUNTER — NON-APPOINTMENT (OUTPATIENT)
Age: 79
End: 2024-05-24

## 2024-05-24 VITALS — SYSTOLIC BLOOD PRESSURE: 95 MMHG | DIASTOLIC BLOOD PRESSURE: 65 MMHG

## 2024-05-24 VITALS
DIASTOLIC BLOOD PRESSURE: 50 MMHG | HEIGHT: 74 IN | OXYGEN SATURATION: 98 % | WEIGHT: 230 LBS | HEART RATE: 71 BPM | TEMPERATURE: 97.8 F | SYSTOLIC BLOOD PRESSURE: 102 MMHG | BODY MASS INDEX: 29.52 KG/M2

## 2024-05-24 DIAGNOSIS — Z96.653 PRESENCE OF ARTIFICIAL KNEE JOINT, BILATERAL: Chronic | ICD-10-CM

## 2024-05-24 DIAGNOSIS — Z98.890 OTHER SPECIFIED POSTPROCEDURAL STATES: Chronic | ICD-10-CM

## 2024-05-24 DIAGNOSIS — R06.09 OTHER FORMS OF DYSPNEA: ICD-10-CM

## 2024-05-24 DIAGNOSIS — Z96.60 PRESENCE OF UNSPECIFIED ORTHOPEDIC JOINT IMPLANT: Chronic | ICD-10-CM

## 2024-05-24 PROCEDURE — 93000 ELECTROCARDIOGRAM COMPLETE: CPT

## 2024-05-24 PROCEDURE — 71275 CT ANGIOGRAPHY CHEST: CPT

## 2024-05-24 PROCEDURE — 99214 OFFICE O/P EST MOD 30 MIN: CPT

## 2024-05-24 PROCEDURE — 71275 CT ANGIOGRAPHY CHEST: CPT | Mod: 26,MH

## 2024-05-24 PROCEDURE — G2211 COMPLEX E/M VISIT ADD ON: CPT

## 2024-05-24 RX ORDER — MIDODRINE HYDROCHLORIDE 10 MG/1
10 TABLET ORAL 3 TIMES DAILY
Qty: 270 | Refills: 0 | Status: ACTIVE | COMMUNITY
Start: 2024-05-24 | End: 1900-01-01

## 2024-05-24 RX ORDER — COLD-HOT PACK
125 MCG EACH MISCELLANEOUS DAILY
Refills: 0 | Status: DISCONTINUED | COMMUNITY
Start: 2023-11-10 | End: 2024-05-24

## 2024-05-24 RX ORDER — EMPAGLIFLOZIN 10 MG/1
10 TABLET, FILM COATED ORAL DAILY
Qty: 90 | Refills: 1 | Status: ACTIVE | COMMUNITY
Start: 2024-05-13 | End: 1900-01-01

## 2024-05-24 RX ORDER — MIDODRINE HYDROCHLORIDE 5 MG/1
5 TABLET ORAL
Qty: 270 | Refills: 0 | Status: ACTIVE | COMMUNITY
Start: 2024-05-24 | End: 1900-01-01

## 2024-05-24 RX ORDER — MULTIVIT-MIN/IRON/FOLIC ACID/K 18-600-40
50 MCG CAPSULE ORAL
Qty: 90 | Refills: 0 | Status: ACTIVE | COMMUNITY
Start: 2024-05-24 | End: 1900-01-01

## 2024-05-24 RX ORDER — METFORMIN ER 500 MG 500 MG/1
500 TABLET ORAL
Qty: 180 | Refills: 0 | Status: DISCONTINUED | COMMUNITY
Start: 2024-02-20 | End: 2024-05-24

## 2024-05-24 RX ORDER — MIDODRINE HYDROCHLORIDE 10 MG/1
10 TABLET ORAL 3 TIMES DAILY
Qty: 270 | Refills: 1 | Status: DISCONTINUED | COMMUNITY
Start: 2024-04-08 | End: 2024-05-24

## 2024-05-26 NOTE — HISTORY OF PRESENT ILLNESS
[Spouse] : spouse [FreeTextEntry1] : fatigue christine [de-identified] : 77 yo male with a PMhx of Neuroendocrine Ca of Liver, CAD s/p NURIA, PVD, ED, Afib, Dilated aorta, CKD, HLD, HTN, HoTN, T2DM, Gout, Hypogonadism, Obesity who presents for increasing fatigue and christine despite recent medication changes. Pt c/o christine with minimal walking. Also reports generalized weakness which has not improved despite lowering sotalol to 40mg BiD due to low bp, along with torsemide decreased to 20mg QOD. Labs 5/23/24 show probnp 2700 (unchanged), Cr improved to 1.2 from 1.5, ALK Phos up trending to 194, and vit D very high at 87. Pending PET scan on Tuesday at [facility] [place]. Denies chest pain, dizziness, diaphoresis, palpitations, LE swelling, orthopnea, syncope, n/v, headache.

## 2024-05-26 NOTE — REVIEW OF SYSTEMS
[Fatigue] : fatigue [Shortness Of Breath] : shortness of breath [Muscle Weakness] : muscle weakness [FreeTextEntry5] : chest tightness per hpi [Chest Pain] : no chest pain [Negative] : Cardiovascular

## 2024-05-26 NOTE — ADDENDUM
[FreeTextEntry1] : This note was written by Carrie Goode on 05/24/2024 acting as medical scribe for Dr. Ivet Vogel. I, Dr. Ivet Vogel, have read and attest that all the information, medical decision making and discharge instructions within are true and accurate.

## 2024-05-26 NOTE — HEALTH RISK ASSESSMENT
[0] : 2) Feeling down, depressed, or hopeless: Not at all (0) [PHQ-2 Negative - No further assessment needed] : PHQ-2 Negative - No further assessment needed [Never] : Never [[id number]] : 0

## 2024-05-26 NOTE — PHYSICAL EXAM
[No Acute Distress] : no acute distress [Well Nourished] : well nourished [Well Developed] : well developed [Well-Appearing] : well-appearing [Normal Sclera/Conjunctiva] : normal sclera/conjunctiva [PERRL] : pupils equal round and reactive to light [EOMI] : extraocular movements intact [Normal Outer Ear/Nose] : the outer ears and nose were normal in appearance [Normal Oropharynx] : the oropharynx was normal [No JVD] : no jugular venous distention [No Lymphadenopathy] : no lymphadenopathy [Supple] : supple [Thyroid Normal, No Nodules] : the thyroid was normal and there were no nodules present [No Respiratory Distress] : no respiratory distress  [No Accessory Muscle Use] : no accessory muscle use [Clear to Auscultation] : lungs were clear to auscultation bilaterally [Normal Rate] : normal rate  [Regular Rhythm] : with a regular rhythm [Normal S1, S2] : normal S1 and S2 [No Murmur] : no murmur heard [No Carotid Bruits] : no carotid bruits [No Abdominal Bruit] : a ~M bruit was not heard ~T in the abdomen [No Varicosities] : no varicosities [Pedal Pulses Present] : the pedal pulses are present [No Edema] : there was no peripheral edema [No Palpable Aorta] : no palpable aorta [No Extremity Clubbing/Cyanosis] : no extremity clubbing/cyanosis [Soft] : abdomen soft [Non Tender] : non-tender [Non-distended] : non-distended [No Masses] : no abdominal mass palpated [No HSM] : no HSM [Normal Bowel Sounds] : normal bowel sounds [Normal Posterior Cervical Nodes] : no posterior cervical lymphadenopathy [Normal Anterior Cervical Nodes] : no anterior cervical lymphadenopathy [No CVA Tenderness] : no CVA  tenderness [No Spinal Tenderness] : no spinal tenderness [No Joint Swelling] : no joint swelling [Grossly Normal Strength/Tone] : grossly normal strength/tone [No Rash] : no rash [Coordination Grossly Intact] : coordination grossly intact [No Focal Deficits] : no focal deficits [Normal Gait] : normal gait [Normal Affect] : the affect was normal [Normal Insight/Judgement] : insight and judgment were intact [Alert and Oriented x3] : oriented to person, place, and time [de-identified] : 1+ pitting edema b/l 2/3 way up

## 2024-05-29 ENCOUNTER — APPOINTMENT (OUTPATIENT)
Dept: INTERNAL MEDICINE | Facility: CLINIC | Age: 79
End: 2024-05-29
Payer: MEDICARE

## 2024-05-29 VITALS
DIASTOLIC BLOOD PRESSURE: 68 MMHG | WEIGHT: 235 LBS | HEART RATE: 83 BPM | TEMPERATURE: 97.9 F | OXYGEN SATURATION: 98 % | SYSTOLIC BLOOD PRESSURE: 102 MMHG | BODY MASS INDEX: 30.16 KG/M2 | HEIGHT: 74 IN

## 2024-05-29 VITALS — SYSTOLIC BLOOD PRESSURE: 106 MMHG | DIASTOLIC BLOOD PRESSURE: 58 MMHG

## 2024-05-29 VITALS — DIASTOLIC BLOOD PRESSURE: 58 MMHG | SYSTOLIC BLOOD PRESSURE: 100 MMHG

## 2024-05-29 DIAGNOSIS — R79.89 OTHER SPECIFIED ABNORMAL FINDINGS OF BLOOD CHEMISTRY: ICD-10-CM

## 2024-05-29 PROCEDURE — G2211 COMPLEX E/M VISIT ADD ON: CPT

## 2024-05-29 PROCEDURE — 99213 OFFICE O/P EST LOW 20 MIN: CPT

## 2024-05-29 NOTE — HISTORY OF PRESENT ILLNESS
[FreeTextEntry1] : bp check [de-identified] : 79 yo male with a PMhx of Neuroendocrine Ca of Liver, CAD s/p NURIA, PVD, ED, Afib, Dilated aorta, CKD, HLD, HTN, HoTN, T2DM, Gout, Hypogonadis here for bp check Pt seen last week borderline low BP. Midodrine was increased to 15 TID from 10 mg TID, denies dizziness/lightheadedness. Dyspnea on exertion unchanged.  CTA chest neg for PE or acute findings last week. PT had pet scan yesterday at [facility] [place] pending results today.

## 2024-05-29 NOTE — HEALTH RISK ASSESSMENT
[0] : 2) Feeling down, depressed, or hopeless: Not at all (0) [PHQ-2 Negative - No further assessment needed] : PHQ-2 Negative - No further assessment needed [[id number]] : 0 [Never] : Never

## 2024-05-29 NOTE — PHYSICAL EXAM
[No Acute Distress] : no acute distress [Well Nourished] : well nourished [Well Developed] : well developed [Well-Appearing] : well-appearing [Normal Sclera/Conjunctiva] : normal sclera/conjunctiva [PERRL] : pupils equal round and reactive to light [EOMI] : extraocular movements intact [Normal Outer Ear/Nose] : the outer ears and nose were normal in appearance [Normal Oropharynx] : the oropharynx was normal [No JVD] : no jugular venous distention [No Lymphadenopathy] : no lymphadenopathy [Supple] : supple [Thyroid Normal, No Nodules] : the thyroid was normal and there were no nodules present [No Respiratory Distress] : no respiratory distress  [No Accessory Muscle Use] : no accessory muscle use [Clear to Auscultation] : lungs were clear to auscultation bilaterally [Normal Rate] : normal rate  [Regular Rhythm] : with a regular rhythm [Normal S1, S2] : normal S1 and S2 [No Murmur] : no murmur heard [No Carotid Bruits] : no carotid bruits [No Abdominal Bruit] : a ~M bruit was not heard ~T in the abdomen [Pedal Pulses Present] : the pedal pulses are present [No Palpable Aorta] : no palpable aorta [No Extremity Clubbing/Cyanosis] : no extremity clubbing/cyanosis [Soft] : abdomen soft [Non Tender] : non-tender [Non-distended] : non-distended [Normal Bowel Sounds] : normal bowel sounds [Normal Posterior Cervical Nodes] : no posterior cervical lymphadenopathy [Normal Anterior Cervical Nodes] : no anterior cervical lymphadenopathy [No CVA Tenderness] : no CVA  tenderness [No Spinal Tenderness] : no spinal tenderness [No Joint Swelling] : no joint swelling [Grossly Normal Strength/Tone] : grossly normal strength/tone [No Rash] : no rash [Coordination Grossly Intact] : coordination grossly intact [No Focal Deficits] : no focal deficits [Normal Gait] : normal gait [Normal Affect] : the affect was normal [Alert and Oriented x3] : oriented to person, place, and time [Normal Insight/Judgement] : insight and judgment were intact [de-identified] : 1+ pitting edema b/l1/3 way up improved

## 2024-06-09 LAB
25(OH)D3 SERPL-MCNC: 87.6 NG/ML
ALP BLD-CCNC: 194 U/L
ANION GAP SERPL CALC-SCNC: 14 MMOL/L
APPEARANCE: CLEAR
BACTERIA UR CULT: NORMAL
BACTERIA: NEGATIVE /HPF
BILIRUBIN URINE: NEGATIVE
BLOOD URINE: NEGATIVE
BUN SERPL-MCNC: 25 MG/DL
CALCIUM SERPL-MCNC: 9.5 MG/DL
CAST: 3 /LPF
CHLORIDE SERPL-SCNC: 108 MMOL/L
CO2 SERPL-SCNC: 20 MMOL/L
COLOR: YELLOW
CREAT SERPL-MCNC: 1.21 MG/DL
EGFR: 61 ML/MIN/1.73M2
EPITHELIAL CELLS: 2 /HPF
GGT SERPL-CCNC: 193 U/L
GLUCOSE QUALITATIVE U: 500 MG/DL
GLUCOSE SERPL-MCNC: 126 MG/DL
KETONES URINE: NEGATIVE MG/DL
LEUKOCYTE ESTERASE URINE: NEGATIVE
MICROSCOPIC-UA: NORMAL
NITRITE URINE: NEGATIVE
NT-PROBNP SERPL-MCNC: 2782 PG/ML
PH URINE: 6
POTASSIUM SERPL-SCNC: 4.7 MMOL/L
PROTEIN URINE: NEGATIVE MG/DL
RED BLOOD CELLS URINE: 0 /HPF
SODIUM SERPL-SCNC: 142 MMOL/L
SPECIFIC GRAVITY URINE: 1.01
UROBILINOGEN URINE: 0.2 MG/DL
WHITE BLOOD CELLS URINE: 0 /HPF

## 2024-06-10 ENCOUNTER — APPOINTMENT (OUTPATIENT)
Dept: CARDIOLOGY | Facility: CLINIC | Age: 79
End: 2024-06-10
Payer: MEDICARE

## 2024-06-10 VITALS
HEIGHT: 74 IN | DIASTOLIC BLOOD PRESSURE: 60 MMHG | TEMPERATURE: 97.4 F | BODY MASS INDEX: 29.13 KG/M2 | HEART RATE: 78 BPM | OXYGEN SATURATION: 99 % | SYSTOLIC BLOOD PRESSURE: 100 MMHG | WEIGHT: 227 LBS

## 2024-06-10 DIAGNOSIS — D64.9 ANEMIA, UNSPECIFIED: ICD-10-CM

## 2024-06-10 DIAGNOSIS — E11.649 TYPE 2 DIABETES MELLITUS WITH HYPOGLYCEMIA W/OUT COMA: ICD-10-CM

## 2024-06-10 DIAGNOSIS — M48.061 SPINAL STENOSIS, LUMBAR REGION WITHOUT NEUROGENIC CLAUDICATION: ICD-10-CM

## 2024-06-10 DIAGNOSIS — R60.0 LOCALIZED EDEMA: ICD-10-CM

## 2024-06-10 DIAGNOSIS — R79.89 OTHER SPECIFIED ABNORMAL FINDINGS OF BLOOD CHEMISTRY: ICD-10-CM

## 2024-06-10 DIAGNOSIS — I73.9 PERIPHERAL VASCULAR DISEASE, UNSPECIFIED: ICD-10-CM

## 2024-06-10 DIAGNOSIS — R06.09 OTHER FORMS OF DYSPNEA: ICD-10-CM

## 2024-06-10 DIAGNOSIS — R82.90 UNSPECIFIED ABNORMAL FINDINGS IN URINE: ICD-10-CM

## 2024-06-10 DIAGNOSIS — R19.7 DIARRHEA, UNSPECIFIED: ICD-10-CM

## 2024-06-10 DIAGNOSIS — Z13.228 ENCOUNTER FOR SCREENING FOR OTHER METABOLIC DISORDERS: ICD-10-CM

## 2024-06-10 DIAGNOSIS — E78.5 HYPERLIPIDEMIA, UNSPECIFIED: ICD-10-CM

## 2024-06-10 PROCEDURE — G2211 COMPLEX E/M VISIT ADD ON: CPT

## 2024-06-10 PROCEDURE — 93000 ELECTROCARDIOGRAM COMPLETE: CPT

## 2024-06-10 PROCEDURE — 99214 OFFICE O/P EST MOD 30 MIN: CPT

## 2024-06-10 NOTE — REVIEW OF SYSTEMS
[Negative] : Heme/Lymph [Feeling Fatigued] : feeling fatigued [Dyspnea on exertion] : dyspnea during exertion [Chest Discomfort] : chest discomfort [Diarrhea] : diarrhea [FreeTextEntry9] : back pain  [FreeTextEntry1] : knee and hipm pain

## 2024-06-11 ENCOUNTER — NON-APPOINTMENT (OUTPATIENT)
Age: 79
End: 2024-06-11

## 2024-06-12 ENCOUNTER — TRANSCRIPTION ENCOUNTER (OUTPATIENT)
Age: 79
End: 2024-06-12

## 2024-06-12 ENCOUNTER — OUTPATIENT (OUTPATIENT)
Dept: OUTPATIENT SERVICES | Facility: HOSPITAL | Age: 79
LOS: 1 days | End: 2024-06-12
Payer: MEDICARE

## 2024-06-12 VITALS
HEART RATE: 78 BPM | DIASTOLIC BLOOD PRESSURE: 57 MMHG | SYSTOLIC BLOOD PRESSURE: 110 MMHG | RESPIRATION RATE: 16 BRPM | OXYGEN SATURATION: 99 % | TEMPERATURE: 99 F

## 2024-06-12 VITALS — WEIGHT: 225.09 LBS | HEIGHT: 74 IN

## 2024-06-12 DIAGNOSIS — Z92.89 PERSONAL HISTORY OF OTHER MEDICAL TREATMENT: Chronic | ICD-10-CM

## 2024-06-12 DIAGNOSIS — Z96.653 PRESENCE OF ARTIFICIAL KNEE JOINT, BILATERAL: Chronic | ICD-10-CM

## 2024-06-12 DIAGNOSIS — Z98.890 OTHER SPECIFIED POSTPROCEDURAL STATES: Chronic | ICD-10-CM

## 2024-06-12 DIAGNOSIS — Z96.60 PRESENCE OF UNSPECIFIED ORTHOPEDIC JOINT IMPLANT: Chronic | ICD-10-CM

## 2024-06-12 DIAGNOSIS — R06.00 DYSPNEA, UNSPECIFIED: ICD-10-CM

## 2024-06-12 LAB
ALBUMIN SERPL ELPH-MCNC: 4 G/DL
ALP BLD-CCNC: 213 U/L
ALT SERPL-CCNC: 43 U/L
ANION GAP SERPL CALC-SCNC: 16 MMOL/L
ANION GAP SERPL CALC-SCNC: 16 MMOL/L — SIGNIFICANT CHANGE UP (ref 5–17)
APPEARANCE: CLEAR
AST SERPL-CCNC: 46 U/L
BACTERIA: NEGATIVE /HPF
BASOPHILS # BLD AUTO: 0.05 K/UL
BASOPHILS NFR BLD AUTO: 0.7 %
BILIRUB DIRECT SERPL-MCNC: 0.2 MG/DL
BILIRUB INDIRECT SERPL-MCNC: 0.2 MG/DL
BILIRUB SERPL-MCNC: 0.4 MG/DL
BILIRUBIN URINE: NEGATIVE
BLOOD URINE: NEGATIVE
BUN SERPL-MCNC: 27 MG/DL
BUN SERPL-MCNC: 29 MG/DL — HIGH (ref 7–23)
CALCIUM SERPL-MCNC: 10 MG/DL — SIGNIFICANT CHANGE UP (ref 8.4–10.5)
CALCIUM SERPL-MCNC: 9.6 MG/DL
CAST: 20 /LPF
CHLORIDE SERPL-SCNC: 107 MMOL/L
CHLORIDE SERPL-SCNC: 109 MMOL/L — HIGH (ref 96–108)
CHOLEST SERPL-MCNC: 171 MG/DL
CK SERPL-CCNC: 98 U/L
CO2 SERPL-SCNC: 15 MMOL/L — LOW (ref 22–31)
CO2 SERPL-SCNC: 18 MMOL/L
COLOR: YELLOW
CREAT SERPL-MCNC: 1.35 MG/DL — HIGH (ref 0.5–1.3)
CREAT SERPL-MCNC: 1.51 MG/DL
EGFR: 47 ML/MIN/1.73M2
EGFR: 54 ML/MIN/1.73M2 — LOW
EOSINOPHIL # BLD AUTO: 0.15 K/UL
EOSINOPHIL NFR BLD AUTO: 2 %
EPITHELIAL CELLS: 8 /HPF
ESTIMATED AVERAGE GLUCOSE: 137 MG/DL
FERRITIN SERPL-MCNC: 573 NG/ML
FOLATE SERPL-MCNC: >20 NG/ML
GLUCOSE QUALITATIVE U: >=1000 MG/DL
GLUCOSE SERPL-MCNC: 101 MG/DL — HIGH (ref 70–99)
GLUCOSE SERPL-MCNC: 85 MG/DL
HBA1C MFR BLD HPLC: 6.4 %
HCT VFR BLD CALC: 35.8 % — LOW (ref 39–50)
HCT VFR BLD CALC: 39.9 %
HDLC SERPL-MCNC: 34 MG/DL
HGB BLD-MCNC: 11.6 G/DL — LOW (ref 13–17)
HGB BLD-MCNC: 11.8 G/DL
HYALINE CASTS: PRESENT
IMM GRANULOCYTES NFR BLD AUTO: 0.1 %
IRON SATN MFR SERPL: 28 %
IRON SERPL-MCNC: 65 UG/DL
KETONES URINE: NEGATIVE MG/DL
LDLC SERPL CALC-MCNC: 105 MG/DL
LEUKOCYTE ESTERASE URINE: ABNORMAL
LYMPHOCYTES # BLD AUTO: 1.5 K/UL
LYMPHOCYTES NFR BLD AUTO: 19.6 %
MAGNESIUM SERPL-MCNC: 2.4 MG/DL
MAN DIFF?: NORMAL
MCHC RBC-ENTMCNC: 29.6 GM/DL
MCHC RBC-ENTMCNC: 30.2 PG
MCHC RBC-ENTMCNC: 31.1 PG — SIGNIFICANT CHANGE UP (ref 27–34)
MCHC RBC-ENTMCNC: 32.4 GM/DL — SIGNIFICANT CHANGE UP (ref 32–36)
MCV RBC AUTO: 102 FL
MCV RBC AUTO: 96 FL — SIGNIFICANT CHANGE UP (ref 80–100)
MICROSCOPIC-UA: NORMAL
MONOCYTES # BLD AUTO: 0.94 K/UL
MONOCYTES NFR BLD AUTO: 12.3 %
NEUTROPHILS # BLD AUTO: 5.01 K/UL
NEUTROPHILS NFR BLD AUTO: 65.3 %
NITRITE URINE: NEGATIVE
NONHDLC SERPL-MCNC: 137 MG/DL
NRBC # BLD: 0 /100 WBCS — SIGNIFICANT CHANGE UP (ref 0–0)
NT-PROBNP SERPL-MCNC: 3319 PG/ML
PH URINE: 5.5
PLATELET # BLD AUTO: 240 K/UL — SIGNIFICANT CHANGE UP (ref 150–400)
PLATELET # BLD AUTO: 284 K/UL
POTASSIUM SERPL-MCNC: 4.3 MMOL/L — SIGNIFICANT CHANGE UP (ref 3.5–5.3)
POTASSIUM SERPL-SCNC: 4.1 MMOL/L
POTASSIUM SERPL-SCNC: 4.3 MMOL/L — SIGNIFICANT CHANGE UP (ref 3.5–5.3)
PROT SERPL-MCNC: 6.6 G/DL
PROTEIN URINE: 30 MG/DL
RBC # BLD: 3.73 M/UL — LOW (ref 4.2–5.8)
RBC # BLD: 3.91 M/UL
RBC # FLD: 14.7 % — HIGH (ref 10.3–14.5)
RBC # FLD: 15.7 %
RED BLOOD CELLS URINE: 1 /HPF
REVIEW: NORMAL
SODIUM SERPL-SCNC: 140 MMOL/L
SODIUM SERPL-SCNC: 140 MMOL/L — SIGNIFICANT CHANGE UP (ref 135–145)
SPECIFIC GRAVITY URINE: 1.02
T3FREE SERPL-MCNC: 2.16 PG/ML
T4 FREE SERPL-MCNC: 1.3 NG/DL
TIBC SERPL-MCNC: 233 UG/DL
TRIGL SERPL-MCNC: 184 MG/DL
TSH SERPL-ACNC: 3.16 UIU/ML
UIBC SERPL-MCNC: 167 UG/DL
UROBILINOGEN URINE: 0.2 MG/DL
VIT B12 SERPL-MCNC: 950 PG/ML
WBC # BLD: 8.08 K/UL — SIGNIFICANT CHANGE UP (ref 3.8–10.5)
WBC # FLD AUTO: 7.66 K/UL
WBC # FLD AUTO: 8.08 K/UL — SIGNIFICANT CHANGE UP (ref 3.8–10.5)
WHITE BLOOD CELLS URINE: 4 /HPF

## 2024-06-12 PROCEDURE — 92928 PRQ TCAT PLMT NTRAC ST 1 LES: CPT | Mod: RC

## 2024-06-12 PROCEDURE — 93454 CORONARY ARTERY ANGIO S&I: CPT | Mod: 26,59

## 2024-06-12 PROCEDURE — 93010 ELECTROCARDIOGRAM REPORT: CPT

## 2024-06-12 PROCEDURE — 99152 MOD SED SAME PHYS/QHP 5/>YRS: CPT

## 2024-06-12 RX ORDER — METOPROLOL TARTRATE 50 MG
50 TABLET ORAL DAILY
Refills: 0 | Status: ACTIVE | OUTPATIENT
Start: 2024-06-12 | End: 2025-05-11

## 2024-06-12 RX ORDER — SOTALOL HCL 120 MG
80 TABLET ORAL EVERY 12 HOURS
Refills: 0 | Status: ACTIVE | OUTPATIENT
Start: 2024-06-12 | End: 2025-05-11

## 2024-06-12 RX ORDER — SODIUM CHLORIDE 9 MG/ML
1000 INJECTION INTRAMUSCULAR; INTRAVENOUS; SUBCUTANEOUS
Refills: 0 | Status: ACTIVE | OUTPATIENT
Start: 2024-06-12 | End: 2024-06-12

## 2024-06-12 RX ORDER — ASPIRIN/CALCIUM CARB/MAGNESIUM 324 MG
81 TABLET ORAL DAILY
Refills: 0 | Status: ACTIVE | OUTPATIENT
Start: 2024-06-12 | End: 2025-05-11

## 2024-06-12 RX ORDER — SODIUM CHLORIDE 9 MG/ML
250 INJECTION INTRAMUSCULAR; INTRAVENOUS; SUBCUTANEOUS ONCE
Refills: 0 | Status: COMPLETED | OUTPATIENT
Start: 2024-06-12 | End: 2024-06-12

## 2024-06-12 RX ORDER — CLOPIDOGREL BISULFATE 75 MG/1
75 TABLET, FILM COATED ORAL DAILY
Refills: 0 | Status: ACTIVE | OUTPATIENT
Start: 2024-06-12 | End: 2025-05-11

## 2024-06-12 RX ORDER — APIXABAN 2.5 MG/1
1 TABLET, FILM COATED ORAL
Qty: 60 | Refills: 0
Start: 2024-06-12 | End: 2024-07-11

## 2024-06-12 RX ORDER — ASPIRIN/CALCIUM CARB/MAGNESIUM 324 MG
1 TABLET ORAL
Qty: 7 | Refills: 0
Start: 2024-06-12 | End: 2024-06-18

## 2024-06-12 RX ORDER — CLOPIDOGREL BISULFATE 75 MG/1
1 TABLET, FILM COATED ORAL
Qty: 90 | Refills: 3
Start: 2024-06-12 | End: 2025-06-06

## 2024-06-12 RX ADMIN — SODIUM CHLORIDE 75 MILLILITER(S): 9 INJECTION INTRAMUSCULAR; INTRAVENOUS; SUBCUTANEOUS at 12:26

## 2024-06-12 RX ADMIN — Medication 80 MILLIGRAM(S): at 17:22

## 2024-06-12 RX ADMIN — SODIUM CHLORIDE 75 MILLILITER(S): 9 INJECTION INTRAMUSCULAR; INTRAVENOUS; SUBCUTANEOUS at 16:26

## 2024-06-12 RX ADMIN — SODIUM CHLORIDE 750 MILLILITER(S): 9 INJECTION INTRAMUSCULAR; INTRAVENOUS; SUBCUTANEOUS at 12:27

## 2024-06-12 RX ADMIN — Medication 50 MILLIGRAM(S): at 16:11

## 2024-06-12 NOTE — H&P CARDIOLOGY - NSICDXPASTSURGICALHX_GEN_ALL_CORE_FT
PAST SURGICAL HISTORY:  H/O laminectomy     History of hernia repair     History of lumbosacral spine surgery     S/P hip replacement right hip    S/P knee replacement, bilateral      PAST SURGICAL HISTORY:  H/O laminectomy     History of cardioversion     History of hernia repair     History of lumbosacral spine surgery     S/P hip replacement right hip    S/P knee replacement, bilateral

## 2024-06-12 NOTE — ASU DISCHARGE PLAN (ADULT/PEDIATRIC) - CARE PROVIDER_API CALL
Agustin Hameed  Cardiology  [address], [address]  [place] [address]  Phone: [phone]  Fax: [phone]  Established Patient  Follow Up Time: 2 weeks

## 2024-06-12 NOTE — ASU DISCHARGE PLAN (ADULT/PEDIATRIC) - NS MD DC FALL RISK RISK
For information on Fall & Injury Prevention, visit: [email or web address] OR  [email or web address] OR  https://www.cdc.gov/steadi/patient.html

## 2024-06-12 NOTE — H&P CARDIOLOGY - HISTORY OF PRESENT ILLNESS
78m hx HCC, s/p radiotherapy, HTN, aflutter s/p DCCV on Eliquis, LE edema on diuretics presented today for [facility], referred by Dr. Hameed.   with reported hypotension and feelings of fatigue before and after OP stress test. Notes decreased appetite ever since his HCC treatment as food no longer has any taste. Denies any localizing infectious symptoms- no urinary/bowel symptoms, no localized aches/pains, no CP/SOB, no lightheadedness/presyncope, no falls/head trauma. States he received 1.5L IV fluids in Dr. Hameed's office, after which his fatigue abated.     TTE from 2024: CONCLUSIONS:  1.Left ventricular cavity is normal in size. Left ventricular wall thickness is moderately increased. Left   ventricular systolic function is normal with an ejection fraction of 67 % by Avila's method of disks.  2.There is moderate (grade 2) left ventricular diastolic dysfunction.  3.The left atrium is moderately dilated.  4.Mild mitral regurgitation.  5.Mild tricuspid regurgitation.  6.Ascending aorta diameter is dilated, measuring 4.50 cm (indexed 1.93 cm/m    7.Estimated pulmonary artery systolic pressure is 43 mmHg.  8.Lipomatous interatrial septal hypertrophy present.  9.Mild mitral valve leaflet calcification.  10.Mild pulmonic regurgitation.  11.Moderate left ventricular hypertrophy.  12.Pericardial fat pad is seen anterior to the right ventricle cannot rule out trivial effusion.  13.The inferior vena cava is dilated measuring 2.35 cm in diameter, (dilated >2.1cm) with normal   inspiratory collapse (normal >50%) consistent with mildly elevated right atrial pressure (~8, range 5 -10mmHg).  14.There is mild calcification of the mitral valve annulus.  15.There is normal LV mass and concentric remodeling.  16.Thickened mitral valve leaflets.  17.Trace aortic regurgitation.  18.Trileaflet aortic valve with normal systolic excursion. There is calcification of the aortic valve leaflets.   There is minimal thickening of the aortic valve leaflets. Fibrocalcific aortic valve sclerosis without    78 year old Male with hx of CAD s/p NURIA to distal LAD in 6/2023, AF on Eliquis, s/p DCCV on Oct 2023, HTN, HLD, prior EtOH disorder (denies current use), T2DM, Scoliosis, spinal surgeries, Metastatic Pancreatic neuroendocrine tumor on Lutathera (first treatment on Friday 10/20 at [facility]), presented to Dr. Hameed  with fatigue and chest pain. Patient states he has ongoing dyspnea and fatigue which is worse for the past several days, which is limiting his activities. Pt is referred for [facility]. Denies any cardiac monitoring device.    TTE from 2024: CONCLUSIONS:  1.Left ventricular cavity is normal in size. Left ventricular wall thickness is moderately increased. Left   ventricular systolic function is normal with an ejection fraction of 67 % by Avila's method of disks.  2.There is moderate (grade 2) left ventricular diastolic dysfunction.  3.The left atrium is moderately dilated.  4.Mild mitral regurgitation.  5.Mild tricuspid regurgitation.  6.Ascending aorta diameter is dilated, measuring 4.50 cm (indexed 1.93 cm/m    7.Estimated pulmonary artery systolic pressure is 43 mmHg.  8.Lipomatous interatrial septal hypertrophy present.  9.Mild mitral valve leaflet calcification.  10.Mild pulmonic regurgitation.  11.Moderate left ventricular hypertrophy.  12.Pericardial fat pad is seen anterior to the right ventricle cannot rule out trivial effusion.  13.The inferior vena cava is dilated measuring 2.35 cm in diameter, (dilated >2.1cm) with normal   inspiratory collapse (normal >50%) consistent with mildly elevated right atrial pressure (~8, range 5 -10mmHg).  14.There is mild calcification of the mitral valve annulus.  15.There is normal LV mass and concentric remodeling.  16.Thickened mitral valve leaflets.  17.Trace aortic regurgitation.  18.Trileaflet aortic valve with normal systolic excursion. There is calcification of the aortic valve leaflets.   There is minimal thickening of the aortic valve leaflets. Fibrocalcific aortic valve sclerosis without

## 2024-06-12 NOTE — ASU DISCHARGE PLAN (ADULT/PEDIATRIC) - ASU DC SPECIAL INSTRUCTIONSFT
See preprinted instructions provided     We have provided you with a prescription for cardiac rehab which is medically supervised exercise program for your heart and has been shown to improve the quantity and quality of life of people with heart disease like yours. You should attend cardiac rehab 3 times per week for 12 weeks. We have provided you with a list of nearby facilities. Please call your insurance carrier to determine which of these facilities are covered under your plan. Please bring this prescription with you to your follow up appointment with your cardiologist who can then further assist you to enroll into a cardiac rehab program. See preprinted instructions provided     We have provided you with a prescription for cardiac rehab which is medically supervised exercise program for your heart and has been shown to improve the quantity and quality of life of people with heart disease like yours. You should attend cardiac rehab 3 times per week for 12 weeks. We have provided you with a list of nearby facilities. Please call your insurance carrier to determine which of these facilities are covered under your plan. Please bring this prescription with you to your follow up appointment with your cardiologist who can then further assist you to enroll into a cardiac rehab program.      ASPIRIN/PLAVIX/ELIQUIS for 7 days then stop ASPIRIN. STOP ASPIRIN ON 6/19/2024

## 2024-06-12 NOTE — PROVIDER CONTACT NOTE (OTHER) - ASSESSMENT
A&0x4, vitals as charted. denies chest pain, palpitations, & SOB. pt was sleeping a the time of the event. pt is on 2 beta blockers but did not take them today.

## 2024-06-12 NOTE — H&P CARDIOLOGY - NSICDXPASTMEDICALHX_GEN_ALL_CORE_FT
PAST MEDICAL HISTORY:  Hepatic carcinoma     Hypercholesterolemia     Hypertension     Neuroendocrine carcinoma     PAD (peripheral artery disease)      PAST MEDICAL HISTORY:  Atrial fibrillation and flutter     Hepatic carcinoma     Hypercholesterolemia     Hypertension     Neuroendocrine carcinoma     PAD (peripheral artery disease)

## 2024-06-14 PROBLEM — R79.89 ELEVATED FERRITIN LEVEL: Status: ACTIVE | Noted: 2024-06-14

## 2024-06-14 PROBLEM — R82.90 ABNORMAL URINALYSIS: Status: ACTIVE | Noted: 2024-06-14

## 2024-06-14 PROBLEM — R82.90 ABNORMAL URINALYSIS: Status: RESOLVED | Noted: 2019-04-23 | Resolved: 2024-06-14

## 2024-06-14 RX ORDER — EZETIMIBE 10 MG/1
10 TABLET ORAL
Qty: 90 | Refills: 0 | Status: ACTIVE | COMMUNITY
Start: 2024-06-14 | End: 1900-01-01

## 2024-06-18 ENCOUNTER — RESULT REVIEW (OUTPATIENT)
Age: 79
End: 2024-06-18

## 2024-06-18 PROBLEM — I25.10 ATHEROSCLEROTIC HEART DISEASE OF NATIVE CORONARY ARTERY WITHOUT ANGINA PECTORIS: Chronic | Status: ACTIVE | Noted: 2024-06-12

## 2024-06-18 PROBLEM — C22.0 LIVER CELL CARCINOMA: Chronic | Status: ACTIVE | Noted: 2024-06-12

## 2024-06-18 PROBLEM — I48.91 UNSPECIFIED ATRIAL FIBRILLATION: Chronic | Status: ACTIVE | Noted: 2024-06-12

## 2024-06-18 LAB
ANION GAP SERPL CALC-SCNC: 15 MMOL/L
APPEARANCE: CLEAR
BACTERIA UR CULT: ABNORMAL
BACTERIA: NEGATIVE /HPF
BILIRUBIN URINE: NEGATIVE
BLOOD URINE: NEGATIVE
BUN SERPL-MCNC: 25 MG/DL
CALCIUM SERPL-MCNC: 10 MG/DL
CAST: 4 /LPF
CHLORIDE SERPL-SCNC: 107 MMOL/L
CO2 SERPL-SCNC: 16 MMOL/L
COLOR: YELLOW
CREAT SERPL-MCNC: 1.4 MG/DL
CREAT SPEC-SCNC: 16 MG/DL
EGFR: 51 ML/MIN/1.73M2
EPITHELIAL CELLS: 2 /HPF
FERRITIN SERPL-MCNC: 628 NG/ML
GLUCOSE QUALITATIVE U: >=1000 MG/DL
GLUCOSE SERPL-MCNC: 120 MG/DL
KETONES URINE: NEGATIVE MG/DL
LEUKOCYTE ESTERASE URINE: NEGATIVE
MICROALBUMIN 24H UR DL<=1MG/L-MCNC: <1.2 MG/DL
MICROALBUMIN/CREAT 24H UR-RTO: NORMAL MG/G
MICROSCOPIC-UA: NORMAL
NITRITE URINE: NEGATIVE
NT-PROBNP SERPL-MCNC: 4076 PG/ML
PH URINE: 6
POTASSIUM SERPL-SCNC: 4.1 MMOL/L
PROTEIN URINE: NEGATIVE MG/DL
RED BLOOD CELLS URINE: 0 /HPF
SODIUM SERPL-SCNC: 139 MMOL/L
SPECIFIC GRAVITY URINE: 1.01
UROBILINOGEN URINE: 0.2 MG/DL
WHITE BLOOD CELLS URINE: 1 /HPF

## 2024-06-19 ENCOUNTER — NON-APPOINTMENT (OUTPATIENT)
Age: 79
End: 2024-06-19

## 2024-06-19 ENCOUNTER — APPOINTMENT (OUTPATIENT)
Dept: INTERNAL MEDICINE | Facility: CLINIC | Age: 79
End: 2024-06-19
Payer: MEDICARE

## 2024-06-19 VITALS
WEIGHT: 222 LBS | DIASTOLIC BLOOD PRESSURE: 80 MMHG | SYSTOLIC BLOOD PRESSURE: 108 MMHG | TEMPERATURE: 97.6 F | HEART RATE: 73 BPM | BODY MASS INDEX: 28.49 KG/M2 | HEIGHT: 74 IN | OXYGEN SATURATION: 98 %

## 2024-06-19 DIAGNOSIS — R53.83 OTHER FATIGUE: ICD-10-CM

## 2024-06-19 DIAGNOSIS — N39.0 URINARY TRACT INFECTION, SITE NOT SPECIFIED: ICD-10-CM

## 2024-06-19 PROCEDURE — 99214 OFFICE O/P EST MOD 30 MIN: CPT

## 2024-06-19 PROCEDURE — G2211 COMPLEX E/M VISIT ADD ON: CPT

## 2024-06-19 PROCEDURE — 93000 ELECTROCARDIOGRAM COMPLETE: CPT | Mod: PD

## 2024-06-19 NOTE — PHYSICAL EXAM
[No Acute Distress] : no acute distress [Well Nourished] : well nourished [Well Developed] : well developed [Well-Appearing] : well-appearing [Normal Sclera/Conjunctiva] : normal sclera/conjunctiva [PERRL] : pupils equal round and reactive to light [EOMI] : extraocular movements intact [Normal Outer Ear/Nose] : the outer ears and nose were normal in appearance [Normal Oropharynx] : the oropharynx was normal [No JVD] : no jugular venous distention [No Lymphadenopathy] : no lymphadenopathy [Supple] : supple [Thyroid Normal, No Nodules] : the thyroid was normal and there were no nodules present [No Respiratory Distress] : no respiratory distress  [No Accessory Muscle Use] : no accessory muscle use [Clear to Auscultation] : lungs were clear to auscultation bilaterally [Normal Rate] : normal rate  [Regular Rhythm] : with a regular rhythm [Normal S1, S2] : normal S1 and S2 [No Murmur] : no murmur heard [No Carotid Bruits] : no carotid bruits [No Abdominal Bruit] : a ~M bruit was not heard ~T in the abdomen [Pedal Pulses Present] : the pedal pulses are present [No Palpable Aorta] : no palpable aorta [No Extremity Clubbing/Cyanosis] : no extremity clubbing/cyanosis [Soft] : abdomen soft [Non Tender] : non-tender [Non-distended] : non-distended [Normal Bowel Sounds] : normal bowel sounds [Normal Posterior Cervical Nodes] : no posterior cervical lymphadenopathy [Normal Anterior Cervical Nodes] : no anterior cervical lymphadenopathy [No CVA Tenderness] : no CVA  tenderness [No Spinal Tenderness] : no spinal tenderness [No Joint Swelling] : no joint swelling [Grossly Normal Strength/Tone] : grossly normal strength/tone [No Rash] : no rash [Coordination Grossly Intact] : coordination grossly intact [No Focal Deficits] : no focal deficits [Normal Gait] : normal gait [Normal Affect] : the affect was normal [Alert and Oriented x3] : oriented to person, place, and time [Normal Insight/Judgement] : insight and judgment were intact [de-identified] : trace ankle edema b/l/. R radial site normal

## 2024-06-19 NOTE — HEALTH RISK ASSESSMENT
[0] : 2) Feeling down, depressed, or hopeless: Not at all (0) [PHQ-2 Negative - No further assessment needed] : PHQ-2 Negative - No further assessment needed [Never] : Never [[id number]] : 0

## 2024-06-19 NOTE — HISTORY OF PRESENT ILLNESS
[Family Member] : family member [Other: _____] : [unfilled] [FreeTextEntry1] : f/u multiple issues [de-identified] : 77 yo male with a PMhx of Neuroendocrine Ca of Liver, CAD s/p NURIA, PVD, ED, Afib, Dilated aorta, CKD, HLD, HTN, HoTN, T2DM, Gout, Hypogonadism here for f/u after cath.   Pt had chest tigthness and sob thus was sent for cath on 6/12/24 with Successful PCI with NURIA to the large RPL.  Patent LAD stent.  Was rec for Triple therapy for 1 week and then drop ASA but pt admits to not taking ASA at all for past week and only took plavix and eliquis. Pt says he has ongoing unchanged RAINES and occasional chest tightness, does not think stent helped him. Pt says he is fatigued all day and does not have energy for past few months. Has proteus uti now on Augmentin, was referred to urologist Pt says he needs cardiac clearance for chemoradiation for worsening cancer. Last PET scan showed worsening mets Denies dizziness, orthopnea, diaphoresis, palpitations, LE swelling, syncope, n/v, headache.

## 2024-06-20 LAB
ALBUMIN SERPL ELPH-MCNC: 3.9 G/DL
ALP BLD-CCNC: 221 U/L
ALT SERPL-CCNC: 53 U/L
ANION GAP SERPL CALC-SCNC: 19 MMOL/L
AST SERPL-CCNC: 53 U/L
BASOPHILS # BLD AUTO: 0.05 K/UL
BASOPHILS NFR BLD AUTO: 0.6 %
BILIRUB SERPL-MCNC: 0.3 MG/DL
BUN SERPL-MCNC: 28 MG/DL
CALCIUM SERPL-MCNC: 9.9 MG/DL
CHLORIDE SERPL-SCNC: 107 MMOL/L
CO2 SERPL-SCNC: 13 MMOL/L
CREAT SERPL-MCNC: 1.84 MG/DL
EGFR: 37 ML/MIN/1.73M2
EOSINOPHIL # BLD AUTO: 0.14 K/UL
EOSINOPHIL NFR BLD AUTO: 1.7 %
GLUCOSE SERPL-MCNC: 92 MG/DL
HCT VFR BLD CALC: 38.2 %
HGB BLD-MCNC: 11.7 G/DL
IMM GRANULOCYTES NFR BLD AUTO: 0.2 %
LYMPHOCYTES # BLD AUTO: 1.49 K/UL
LYMPHOCYTES NFR BLD AUTO: 17.9 %
MAN DIFF?: NORMAL
MCHC RBC-ENTMCNC: 30.2 PG
MCHC RBC-ENTMCNC: 30.6 GM/DL
MCV RBC AUTO: 98.7 FL
MONOCYTES # BLD AUTO: 0.98 K/UL
MONOCYTES NFR BLD AUTO: 11.8 %
NEUTROPHILS # BLD AUTO: 5.66 K/UL
NEUTROPHILS NFR BLD AUTO: 67.8 %
NT-PROBNP SERPL-MCNC: 4699 PG/ML
PLATELET # BLD AUTO: 284 K/UL
POTASSIUM SERPL-SCNC: 4 MMOL/L
PROT SERPL-MCNC: 7.2 G/DL
PSA FREE FLD-MCNC: 39 %
PSA FREE SERPL-MCNC: 0.71 NG/ML
PSA SERPL-MCNC: 1.85 NG/ML
RBC # BLD: 3.87 M/UL
RBC # FLD: 14.9 %
SODIUM SERPL-SCNC: 138 MMOL/L
TM INTERPRETATION: NORMAL
WBC # FLD AUTO: 8.34 K/UL

## 2024-06-21 ENCOUNTER — APPOINTMENT (OUTPATIENT)
Dept: CARDIOLOGY | Facility: CLINIC | Age: 79
End: 2024-06-21
Payer: MEDICARE

## 2024-06-21 DIAGNOSIS — R07.9 CHEST PAIN, UNSPECIFIED: ICD-10-CM

## 2024-06-21 LAB
ALBUMIN SERPL ELPH-MCNC: 3.6 G/DL
ALP BLD-CCNC: 216 U/L
ALT SERPL-CCNC: 54 U/L
ANION GAP SERPL CALC-SCNC: 19 MMOL/L
AST SERPL-CCNC: 78 U/L
BILIRUB SERPL-MCNC: 0.5 MG/DL
BUN SERPL-MCNC: 28 MG/DL
CALCIUM SERPL-MCNC: 9.4 MG/DL
CHLORIDE SERPL-SCNC: 108 MMOL/L
CO2 SERPL-SCNC: 15 MMOL/L
CREAT SERPL-MCNC: 1.75 MG/DL
EGFR: 39 ML/MIN/1.73M2
GLUCOSE SERPL-MCNC: 127 MG/DL
NT-PROBNP SERPL-MCNC: 3528 PG/ML
POTASSIUM SERPL-SCNC: 3.8 MMOL/L
PROT SERPL-MCNC: 6.1 G/DL
SODIUM SERPL-SCNC: 141 MMOL/L

## 2024-06-21 PROCEDURE — 93005 ELECTROCARDIOGRAM TRACING: CPT

## 2024-06-21 PROCEDURE — A9500: CPT | Mod: PD

## 2024-06-21 PROCEDURE — 85027 COMPLETE CBC AUTOMATED: CPT

## 2024-06-21 PROCEDURE — 80048 BASIC METABOLIC PNL TOTAL CA: CPT

## 2024-06-21 PROCEDURE — C1894: CPT

## 2024-06-21 PROCEDURE — 93010 ELECTROCARDIOGRAM REPORT: CPT | Mod: 76

## 2024-06-21 PROCEDURE — C9600: CPT | Mod: RC

## 2024-06-21 PROCEDURE — C1887: CPT

## 2024-06-21 PROCEDURE — 78452 HT MUSCLE IMAGE SPECT MULT: CPT | Mod: PD

## 2024-06-21 PROCEDURE — 93454 CORONARY ARTERY ANGIO S&I: CPT | Mod: 59

## 2024-06-21 PROCEDURE — C1769: CPT

## 2024-06-21 PROCEDURE — 93015 CV STRESS TEST SUPVJ I&R: CPT | Mod: PD

## 2024-06-21 PROCEDURE — C1874: CPT

## 2024-06-24 ENCOUNTER — TRANSCRIPTION ENCOUNTER (OUTPATIENT)
Age: 79
End: 2024-06-24

## 2024-06-27 ENCOUNTER — TRANSCRIPTION ENCOUNTER (OUTPATIENT)
Age: 79
End: 2024-06-27

## 2024-06-28 ENCOUNTER — RESULT REVIEW (OUTPATIENT)
Age: 79
End: 2024-06-28

## 2024-07-02 ENCOUNTER — NON-APPOINTMENT (OUTPATIENT)
Age: 79
End: 2024-07-02

## 2024-07-02 ENCOUNTER — APPOINTMENT (OUTPATIENT)
Dept: INTERNAL MEDICINE | Facility: CLINIC | Age: 79
End: 2024-07-02
Payer: MEDICARE

## 2024-07-02 VITALS
SYSTOLIC BLOOD PRESSURE: 104 MMHG | HEART RATE: 80 BPM | TEMPERATURE: 97.7 F | HEIGHT: 74 IN | WEIGHT: 216 LBS | DIASTOLIC BLOOD PRESSURE: 64 MMHG | BODY MASS INDEX: 27.72 KG/M2 | OXYGEN SATURATION: 99 %

## 2024-07-02 VITALS — SYSTOLIC BLOOD PRESSURE: 85 MMHG | DIASTOLIC BLOOD PRESSURE: 60 MMHG

## 2024-07-02 DIAGNOSIS — N18.30 CHRONIC KIDNEY DISEASE, STAGE 3 UNSPECIFIED: ICD-10-CM

## 2024-07-02 DIAGNOSIS — I95.9 HYPOTENSION, UNSPECIFIED: ICD-10-CM

## 2024-07-02 DIAGNOSIS — R06.02 SHORTNESS OF BREATH: ICD-10-CM

## 2024-07-02 DIAGNOSIS — I48.91 UNSPECIFIED ATRIAL FIBRILLATION: ICD-10-CM

## 2024-07-02 DIAGNOSIS — I31.9 DISEASE OF PERICARDIUM, UNSPECIFIED: ICD-10-CM

## 2024-07-02 DIAGNOSIS — I10 ESSENTIAL (PRIMARY) HYPERTENSION: ICD-10-CM

## 2024-07-02 DIAGNOSIS — R07.89 OTHER CHEST PAIN: ICD-10-CM

## 2024-07-02 DIAGNOSIS — C7A.8 OTHER MALIGNANT NEUROENDOCRINE TUMORS: ICD-10-CM

## 2024-07-02 DIAGNOSIS — I25.10 ATHEROSCLEROTIC HEART DISEASE OF NATIVE CORONARY ARTERY W/OUT ANGINA PECTORIS: ICD-10-CM

## 2024-07-02 DIAGNOSIS — I77.810 THORACIC AORTIC ECTASIA: ICD-10-CM

## 2024-07-02 PROCEDURE — G2211 COMPLEX E/M VISIT ADD ON: CPT | Mod: NC

## 2024-07-02 PROCEDURE — 99496 TRANSJ CARE MGMT HIGH F2F 7D: CPT

## 2024-07-02 PROCEDURE — 93000 ELECTROCARDIOGRAM COMPLETE: CPT | Mod: PD

## 2024-07-02 RX ORDER — COLCHICINE 0.6 MG/1
0.6 TABLET ORAL DAILY
Refills: 0 | Status: ACTIVE | COMMUNITY
Start: 2024-07-02

## 2024-07-02 RX ORDER — ONDANSETRON 4 MG/1
4 TABLET, ORALLY DISINTEGRATING ORAL TWICE DAILY
Qty: 14 | Refills: 0 | Status: ACTIVE | COMMUNITY
Start: 2024-07-02 | End: 1900-01-01

## 2024-07-02 NOTE — ED PROVIDER NOTE - CARE PLAN
Principal Discharge DX:	RAZIA (acute kidney injury)  Secondary Diagnosis:	Elevated liver function tests  Secondary Diagnosis:	Fatigue   1

## 2024-07-02 NOTE — H&P ADULT - PROBLEM SELECTOR PLAN 4
s/p PCI x 3 with most recent stent placed 6/12/24  - c/w Plavix and Eliquis (completed 1 week of triple therapy with DAPT+plavix)  - c/w atorvastatin 80mg qHS while in patient. resume home rosuva on d/c

## 2024-07-02 NOTE — H&P ADULT - PROBLEM SELECTOR PLAN 6
known metastatic pancreatic neuroendocrine tumor  - follows with Dr. Sophia Prasad at [facility]  - radiation therapy currently on hold given recent cardiac issues awaiting cards clearance to resume  - outpatient f/u with Dr. Prasad upon discharge

## 2024-07-02 NOTE — H&P ADULT - NSHPLABSRESULTS_GEN_ALL_CORE
Labs reviewed:                         11.4   10.83 )-----------( 354      ( 02 Jul 2024 14:12 )             35.3     07-02    136  |  104  |  31<H>  ----------------------------<  168<H>  4.0   |  16<L>  |  1.59<H>    Ca    10.0      02 Jul 2024 14:12    TPro  7.0  /  Alb  3.1<L>  /  TBili  0.5  /  DBili  x   /  AST  117<H>  /  ALT  81<H>  /  AlkPhos  307<H>  07-02      Urinalysis Basic - ( 02 Jul 2024 14:12 )    Color: x / Appearance: x / SG: x / pH: x  Gluc: 168 mg/dL / Ketone: x  / Bili: x / Urobili: x   Blood: x / Protein: x / Nitrite: x   Leuk Esterase: x / RBC: x / WBC x   Sq Epi: x / Non Sq Epi: x / Bacteria: x      Imaging personally reviewed:  7/2/24 CXR with clear lungs. no evidence of PNA/infiltrate    7/2/24 Abd US:  FINDINGS:  Liver: Nodular contour. Heterogeneous with innumerable intrahepatic   echogenic masses. The right hepatic lobe measures 18.5 cm.  Bile ducts: Normal caliber. Common bile duct measures 4 mm.  Gallbladder: Distended. No cholelithiasis, gallbladder wall thickening,   or pericholecystic fluid.  Pancreas: Visualized portions are within normal limits.  Spleen: 10.9 cm. Within normal limits.  Right kidney: 10.3 cm. No hydronephrosis. An exophytic cyst measures 2.9   x 1.9 x 2.9 cm.  Left kidney: 12.0 cm. No hydronephrosis.  Ascites: None.  Aorta and IVC: Visualized portions are within normal limits.    Small left pleural effusion.    IMPRESSION:  Innumerable intrahepatic echogenic masses consistent with known   metastatic neuroendocrine tumor.    ECG reviewed and interpreted:

## 2024-07-02 NOTE — H&P ADULT - PROBLEM SELECTOR PLAN 3
ongoing. insetting of likely known metastatic pancreatic neuroendocrine tumor  - will liberalize diet to regular diet with prosource gelatein plus as per nutrition recs last admission  - c/w zofran PRN nausea  - may benefit from appetite stimulant such as mirtazapine if continues to persists

## 2024-07-02 NOTE — ED PROVIDER NOTE - CLINICAL SUMMARY MEDICAL DECISION MAKING FREE TEXT BOX
78-year-old male past medical history significant for hypertension, hyperlipidemia, PAD,, recently diagnosed diffuse treatment, recently discharged in 29 2024 status post diagnostic [facility] presents emergency department   As directed by PMD after he was found to be hypotensive at PMDs office today. patient currently not hypotensive, will get screening ekg and labs and contact pmd.

## 2024-07-02 NOTE — H&P ADULT - HISTORY OF PRESENT ILLNESS
79 yo male with PMH of CAD s/p PCI x3 with most recent stent 6/12/24 on Plavix, chronic Afib on eliquis, orthostatic hypotension on midodrine, PAD, 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 Patient states since discharge on this past Saturday, he continued to feel ongoing generalized weakness and dizziness with positional changes despite compliance with home midodrine 15mg TID and holding torsemide as instructed. Admits to poor PO intake of fluids/solute due to ongoing issues with poor appetite and nausea with meals which is not new. Denies any fever, chills, chest pain, SOB, cough, abd pain, dysuria nor urinary frequency. Has chronic diarrhea that is unchanged from his baseline.     In the ED, vitals notable for SBP 92-11s. Labs notable for elevated BUN/Cr 31/1.59 (from 30/1.18 on day of discharge 6/29/24). CXR with clear lungs. Abd US with innumerable intrahepatic echogenic masses consistent with known metastatic neuroendocrine tumor. Admitted to medicine for symptomatic hypotension and RAZIA.   79 yo male with PMH of CAD s/p PCI x3 with most recent stent 6/12/24 on Plavix, chronic Afib on eliquis, orthostatic hypotension on midodrine, PAD, 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 Patient states since discharge on this past Saturday, he continued to feel ongoing generalized weakness and dizziness with positional changes despite compliance with home midodrine 15mg TID and holding torsemide as instructed. Admits to poor PO intake of fluids/solute due to ongoing issues with poor appetite and nausea with meals which is not new. Denies any fever, chills, chest pain, SOB, cough, abd pain, dysuria nor urinary frequency. Has chronic diarrhea that is unchanged from his baseline.     In the ED, vitals notable for SBP 92-11s. Labs notable for elevated BUN/Cr 31/1.59 (from 30/1.18 on day of discharge 6/29/24). CXR with clear lungs. Abd US with innumerable intrahepatic echogenic masses consistent with known metastatic neuroendocrine tumor. Admitted to medicine for symptomatic hypotension and RAZIA.

## 2024-07-02 NOTE — H&P ADULT - PROBLEM SELECTOR PLAN 1
reported with SBP 80s in PCP office earlier today despite taking midodrine dose in AM. has had ongoing dizziness with positional changes and poor PO intake of fluids/solute due to ongoing lack of appetite  - hypovolemic on exam  - check orthostatics  - continue to hold home torsemide (has not been taking as instructed on d/c)  - c/w home midodrine 15mg TID  - start LR @ 75cc/hr for total 1L and reassess in volume status in AM  - denies any urinary symptoms at this time, but I do note he had abnormal UA (though likely contaminated given epithelial cells) night prior to d/c, thus will re-check UA to r/o infectious etiology contributing to his ongoing hypotension

## 2024-07-02 NOTE — ED ADULT NURSE REASSESSMENT NOTE - NS ED NURSE REASSESS COMMENT FT1
pt bladder scanned, 358mL noted to bladder, MADDY Wagoner notified. pt bladder scanned, 358mL noted to bladder, ACP Siriodis notified.

## 2024-07-02 NOTE — H&P ADULT - NSHPADDITIONALINFOADULT_GEN_ALL_CORE
.  Jaye Allen MD  Division of Hospital Medicine  [facility]   Available on Microsoft Teams - messages preferred prior to calls.    Plan discussed with patient, Cardiology Attending Dr. Hayes, and medicine NP Yeyo.

## 2024-07-02 NOTE — ED ADULT NURSE NOTE - NSFALLUNIVINTERV_ED_ALL_ED
Bed/Stretcher in lowest position, wheels locked, appropriate side rails in place/Call bell, personal items and telephone in reach/Instruct patient to call for assistance before getting out of bed/chair/stretcher/Non-slip footwear applied when patient is off stretcher/[place] to call system/Physically safe environment - no spills, clutter or unnecessary equipment/Purposeful proactive rounding/Room/bathroom lighting operational, light cord in reach

## 2024-07-02 NOTE — ED PROVIDER NOTE - NSICDXPASTSURGICALHX_GEN_ALL_CORE_FT
PAST SURGICAL HISTORY:  H/O laminectomy     History of cardioversion     History of hernia repair     History of lumbosacral spine surgery     S/P hip replacement right hip    S/P knee replacement, bilateral

## 2024-07-02 NOTE — H&P ADULT - PROBLEM SELECTOR PLAN 2
on possible superimposed CKD2. his Cr has been labile in last year but overall baseline in 2023 appears to be 1.0-1.2 range per chart review  - BUN/Cr on admission 31/1.59 from 30/1.18 on day of discharge 6/29/24  - hypovolemic on exam  - continue to hold home torsemide which he takes for LE edema, no HF on recent TTE  (has not taken since discharge)  - start LR @ 75cc/hr for total 1L and reassess in AM  - check urine lytes  - check bladder scan to r/o component of retention  - renally dose meds to GFR, avoid nephrotoxic agents

## 2024-07-02 NOTE — ED ADULT NURSE NOTE - OBJECTIVE STATEMENT
PT is a 78 year old A&OX4 male with HTN, HLD, PAD, A-fib, CAD status post PCI x 3 (most recent stent was 1 week ago (Dr. Raymundo), neuroendocrine tumor who presents to the ED from his cardiologist's office via EMS with c/o hypotension. PT's daughter at the bedside, states PT was hospitalized here for a few days and discharged with cardiology F/U outpatient which he was at today when he was found to be hypotensive to 85/60. PT's daughter states "PT was referred back to the hospital to have a full cardiac work-up because they did not do a full one last time." PT endorsing some SOB but states it is improved since he left the hospital. PT has no other complaints. PT denies chest pain, N/V/D, dizziness, fevers, and chills. PT is resting comfortably in bed, breathing unlabored on room air, and speaking in complete sentences. Abdomen is soft, non-tender, and non-distended. Skin is warm and dry, no diaphoresis noted. No edema noted to B/L extremities. Strong strength in B/L extremities, sensation intact. IV access established 20G in RAC. PT placed in hospital gown. EKG completed, PT placed on cardiac monitor. VSS, PT not currently hypotensive in the ED. Safety and comfort maintained. Daughter at the bedside.

## 2024-07-02 NOTE — H&P ADULT - PROBLEM SELECTOR PLAN 7
started on colchicine last admissions for post cath pericarditis with improvement in symptoms  - c/w colchicine 0.6mg daily

## 2024-07-02 NOTE — ED PROVIDER NOTE - ATTENDING CONTRIBUTION TO CARE
78-year-old male with past medical history of HTN, HLD, PAD, A-fib, CAD status post PCI x 3, from cardiology clinic for hypotension.  Patient reports he was following up with cardiology this morning for routine follow-up and was sent here for low blood pressure despite use of midodrine this morning.  Patient currently has no acute symptoms.  Denies dizziness, chest pain, shortness of breath.    PE: Well developed well-nourished male in no acute distress.  Pupils are round and reactive to light, no pale conjunctiva.  Regular rate and rhythm no murmurs rubs or gallops.  Lungs are clear to auscultation bilaterally.  Abdomen is soft and nontender.  Skin is normal, <2 sec cap refill.  2+ radial pulses.    CBC, CMP - concern for hypostatic hypotension in setting of dehydration vs acute RAZIA  will speak with pts cardiologist Dr. Felipe, who recommended pt be admitted for IVF and further exams by Dr. Hayes

## 2024-07-02 NOTE — ED PROVIDER NOTE - PROGRESS NOTE DETAILS
Zahira Barlow MD (PGY-2 EM): discussed w. Dr. Hayes. will get RUQ u/s. will admit for RAZIA, fatigue, elvation in LFTs.

## 2024-07-02 NOTE — H&P ADULT - TIME-BASED BILLING (NON-CRITICAL CARE)
Insulin pen education provided to daughter and daughter able to understand/demonstrate proper insulin pen use.    Patient education included insulin pen preparation, priming, dose selection, injection site selection, administration and storage.    Geovanna Gutiérrez RPH  6/22/2017   3:28 PM   Time-based billing (NON-critical care)

## 2024-07-02 NOTE — ED PROVIDER NOTE - NSICDXPASTMEDICALHX_GEN_ALL_CORE_FT
PAST MEDICAL HISTORY:  Atrial fibrillation and flutter     CAD (coronary artery disease)     Hepatic carcinoma     Hypercholesterolemia     Hypertension     Neuroendocrine carcinoma     PAD (peripheral artery disease)

## 2024-07-02 NOTE — H&P ADULT - NSHPPHYSICALEXAM_GEN_ALL_CORE
CONSTITUTIONAL: NAD, well-developed, well-groomed  EYES: PERRLA; conjunctiva and sclera clear  ENMT: Moist oral mucosa, no pharyngeal injection or exudates; normal dentition  NECK: Supple, no palpable masses; no thyromegaly  RESPIRATORY: Normal respiratory effort; lungs are clear to auscultation bilaterally, no wheeze nor crackles  CARDIOVASCULAR: Regular rate and rhythm, normal S1 and S2, no murmur/rub/gallop; No lower extremity edema  ABDOMEN: Soft, Non-distended, Nontender to palpation, normoactive bowel sounds  MUSCULOSKELETAL:  No clubbing or cyanosis of digits; no joint swelling or tenderness to palpation  PSYCH: A+O to person, place, and time; affect appropriate  NEUROLOGY: CN 2-12 are intact and symmetric; no gross sensory deficits   SKIN: No rashes; no palpable lesions, +b/l LE with chronic venous stasis changes, +skin turgor

## 2024-07-02 NOTE — H&P ADULT - ASSESSMENT
79 yo male with PMH of CAD s/p PCI x3 with most recent stent 6/12/24 on Plavix, chronic Afib on eliquis, orthostatic hypotension on midodrine, PAD, neuroendocrine tumor with RT currently on hold, recent admission for dx cath on 6/24/24 who presents from PCP's office for hypotension despite taking AM midodrine dose, admitted for symptomatic hypotension and RAZIA.

## 2024-07-02 NOTE — H&P ADULT - NSHPREVIEWOFSYSTEMS_GEN_ALL_CORE
CONSTITUTIONAL: No fever, +fatigue, +generalized malaise  EYES: No eye pain, visual disturbances, or discharge  ENMT:  No difficulty hearing, tinnitus, vertigo; No sinus or throat pain  NECK: No pain or stiffness  RESPIRATORY: No cough, wheezing, chills or hemoptysis; No shortness of breath  CARDIOVASCULAR: No chest pain, palpitations, or leg swelling, +dizziness with positional changes  GASTROINTESTINAL: No abdominal or epigastric pain. No vomiting, or hematemesis; +chronic diarrhea, +nausea, +poor appetite  GENITOURINARY: No dysuria, frequency, hematuria, or incontinence  NEUROLOGICAL: No headaches, memory loss, loss of strength, numbness, or tremors  SKIN: No itching, burning, rashes, or lesions   LYMPH NODES: No enlarged glands  ENDOCRINE: No heat or cold intolerance; No hair loss  MUSCULOSKELETAL: No joint pain or swelling; No muscle, back, or extremity pain  PSYCHIATRIC: No depression, anxiety, mood swings, or difficulty sleeping  HEME/LYMPH: No easy bruising, or bleeding gums

## 2024-07-02 NOTE — ED PROVIDER NOTE - OBJECTIVE STATEMENT
78-year-old male past medical history significant for hypertension, hyperlipidemia, PAD,, recently diagnosed diffuse treatment, recently discharged in 29 2024 status post diagnostic [facility] presents emergency department   As directed by PMD after he was found to be hypotensive at PMDs office today.  Patient currently on midodrine 15 mg 3 times a day.  Patient states he has been hypotensive since discharge.  Patient denies lightheadedness, dizziness, chest pain.  Patient states he is mildly short of breath, similar to prior to admission.  Patient seen evaluated at bedside with wife who corroborates story.

## 2024-07-03 NOTE — PHYSICAL THERAPY INITIAL EVALUATION ADULT - ADDITIONAL COMMENTS
Pt resides in a private home with wife. Pt has a chair lift. Pt uses cane/rolling walker to ambulate and independent with all ADLs. pt's wife is bedbound and cannot assist. Pt owns wheelchair.

## 2024-07-03 NOTE — PROGRESS NOTE ADULT - PROBLEM SELECTOR PLAN 6
known metastatic pancreatic neuroendocrine tumor  - follows with Dr. Sophia Prasad at [facility]  - radiation therapy currently on hold given recent cardiac issues awaiting cards clearance to resume  - outpatient f/u with Dr. Prasad upon discharge  - consider endo consult for possible hypotension from adrenal gland dysfunction

## 2024-07-03 NOTE — PROGRESS NOTE ADULT - SUBJECTIVE AND OBJECTIVE BOX
SUBJECTIVE / OVERNIGHT EVENTS:  Today is hospital day 1d. There are no new issues or overnight events.   Did not endorse any headache, lightheadedness, vertigo, shortness of breathe, cough, chest pain, palpitations, tachycardia, abdominal pain, nausea, vomiting, diarrhea or constipation currently    HPI:  77 yo male with PMH of CAD s/p PCI x3 with most recent stent 6/12/24 on Plavix, chronic Afib on eliquis, orthostatic hypotension on midodrine, PAD, 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 Patient states since discharge on this past Saturday, he continued to feel ongoing generalized weakness and dizziness with positional changes despite compliance with home midodrine 15mg TID and holding torsemide as instructed. Admits to poor PO intake of fluids/solute due to ongoing issues with poor appetite and nausea with meals which is not new. Denies any fever, chills, chest pain, SOB, cough, abd pain, dysuria nor urinary frequency. Has chronic diarrhea that is unchanged from his baseline.     In the ED, vitals notable for SBP 92-11s. Labs notable for elevated BUN/Cr 31/1.59 (from 30/1.18 on day of discharge 6/29/24). CXR with clear lungs. Abd US with innumerable intrahepatic echogenic masses consistent with known metastatic neuroendocrine tumor. Admitted to medicine for symptomatic hypotension and RAZIA. (02 Jul 2024 18:34)    MEDICATIONS  (STANDING):  allopurinol 100 milliGRAM(s) Oral two times a day  apixaban 5 milliGRAM(s) Oral every 12 hours  ascorbic acid 500 milliGRAM(s) Oral daily  atorvastatin 80 milliGRAM(s) Oral at bedtime  clopidogrel Tablet 75 milliGRAM(s) Oral daily  colchicine 0.6 milliGRAM(s) Oral daily  folic acid 1 milliGRAM(s) Oral daily  lactated ringers. 1000 milliLiter(s) (75 mL/Hr) IV Continuous <Continuous>  midodrine. 15 milliGRAM(s) Oral three times a day  multivitamin 1 Tablet(s) Oral daily  sotalol. 40 milliGRAM(s) Oral every 24 hours    MEDICATIONS  (PRN):  acetaminophen     Tablet .. 650 milliGRAM(s) Oral every 6 hours PRN Temp greater or equal to 38C (100.4F), Mild Pain (1 - 3)  ondansetron Injectable 4 milliGRAM(s) IV Push every 8 hours PRN Nausea and/or Vomiting    HOME MEDICATIONS:  allopurinol 100 mg oral tablet: 1 tab(s) orally 2 times a day  ascorbic acid 500 mg oral tablet: 1 tab(s) orally once a day  clopidogrel 75 mg oral tablet: 1 tab(s) orally once a day  colchicine 0.6 mg oral tablet: 1 tab(s) orally once a day  Eliquis 5 mg oral tablet: 1 tab(s) orally 2 times a day  folic acid 1 mg oral tablet: 1 tab(s) orally once a day  Jardiance 10 mg oral tablet: 1 tab(s) orally once a day  midodrine 10 mg oral tablet: 1 tab(s) orally 3 times a day  midodrine 5 mg oral tablet: 1 tab(s) orally 3 times a day  Multiple Vitamins oral tablet: 1 tab(s) orally once a day  rosuvastatin 40 mg oral tablet: 1 tab(s) orally once a day  sotalol 80 mg oral tablet: 0.5 tab(s) orally once a day  Zetia 10 mg oral tablet: 1 tab(s) orally once a day    PHYSICAL EXAM  Vital Signs Last 24 Hrs  T(C): 36.4 (03 Jul 2024 13:35), Max: 37 (02 Jul 2024 23:37)  T(F): 97.5 (03 Jul 2024 13:35), Max: 98.6 (02 Jul 2024 23:37)  HR: 62 (03 Jul 2024 13:35) (62 - 85)  BP: 110/68 (03 Jul 2024 13:35) (101/44 - 116/51)  BP(mean): 71 (02 Jul 2024 19:56) (66 - 77)  RR: 18 (03 Jul 2024 13:35) (18 - 20)  SpO2: 98% (03 Jul 2024 13:35) (97% - 99%)    Parameters below as of 03 Jul 2024 13:35  Patient On (Oxygen Delivery Method): room air        07-03-24 @ 07:01  -  07-03-24 @ 14:17  --------------------------------------------------------  IN: 500 mL / OUT: 400 mL / NET: 100 mL      CONSTITUTIONAL: Well-groomed, in no apparent distress;  EYES: No conjunctival or scleral injection, non-icteric;  ENMT: No external nasal lesions; Normal outer ears;  NECK: Trachea midline;  RESPIRATORY: Normal respiratory effort; Decreased breathe sounds bilaterally without wheeze/rhonchi/rales;  CARDIOVASCULAR: Regular rate and rhythm;  GASTROINTESTINAL: Non-distended; No palpable masses; No rebound/guarding;  EXTREMITIES:  No lower extremity edema;  NEUROLOGY: A+O to person, place, and time; Does respond to commands appropriately;  PSYCHIATRY: Mood and Affect appropriate    LABS:                        10.0   9.74  )-----------( 272      ( 03 Jul 2024 06:51 )             30.0     07-03    139  |  107  |  26<H>  ----------------------------<  110<H>  4.5   |  19<L>  |  1.38<H>    Ca    9.4      03 Jul 2024 06:51  Phos  3.0     07-03  Mg     1.8     07-03    TPro  7.0  /  Alb  3.1<L>  /  TBili  0.5  /  DBili  x   /  AST  117<H>  /  ALT  81<H>  /  AlkPhos  307<H>  07-02          Urinalysis Basic - ( 03 Jul 2024 06:51 )    Color: x / Appearance: x / SG: x / pH: x  Gluc: 110 mg/dL / Ketone: x  / Bili: x / Urobili: x   Blood: x / Protein: x / Nitrite: x   Leuk Esterase: x / RBC: x / WBC x   Sq Epi: x / Non Sq Epi: x / Bacteria: x            RADIOLOGY & ADDITIONAL TESTS:  EKG  12 Lead ECG:   Ventricular Rate 84 BPM    Atrial Rate 84 BPM    P-R Interval 180 ms    QRS Duration 98 ms    Q-T Interval 412 ms    QTC Calculation(Bazett) 486 ms    P Axis 64 degrees    R Axis 44 degrees    T Axis 40 degrees    Diagnosis Line SINUS RHYTHM WITH PREMATURE SUPRAVENTRICULAR COMPLEXES  LOW VOLTAGE QRS  SEPTAL INFARCT (CITED ON OR BEFORE 03-JUL-2024)  ABNORMAL ECG  WHEN COMPARED WITH ECG OF 03-JUL-2024 04:44, (UNCONFIRMED)  NO SIGNIFICANT CHANGE WAS FOUND  Confirmed by Agustin Nogueira MD ([id number]) on 7/3/2024 9:29:20 AM (07-03-24 @ 04:45)  12 Lead ECG:   Ventricular Rate 82 BPM    Atrial Rate 82 BPM    P-R Interval 178 ms    QRS Duration 82 ms    Q-T Interval 408 ms    QTC Calculation(Bazett) 476 ms    P Axis 100 degrees    R Axis 38 degrees    T Axis -25 degrees    Diagnosis Line SINUS RHYTHM WITHPREMATURE ATRIAL COMPLEXES  LOW VOLTAGE QRS  CANNOT RULE OUT ANTEROSEPTAL INFARCT , AGE UNDETERMINED  ABNORMAL ECG  WHEN COMPARED WITH ECG OF 02-JUL-2024 13:40, (UNCONFIRMED)  SIGNIFICANT CHANGES HAVE OCCURRED  Confirmed by Agustin Nogueira MD ([id number])on 7/3/2024 9:29:26 AM (07-03-24 @ 04:44)    Xray Chest 1 View- PORTABLE-Urgent:   ACC: [id number] EXAM:  XR CHEST PORTABLE URGENT 1V   ORDERED BY:  JAEL RUIZ     PROCEDURE DATE:  07/02/2024          INTERPRETATION:  EXAMINATION: XR CHEST URGENT    CLINICAL INDICATION: Shortness of breath.    TECHNIQUE: Single frontal, portable view of the chest was obtained.    COMPARISON: Chest x-ray 6/25/2024.    FINDINGS:  Partially visualized bilateral krishna and pedicle screw fixation of the   spine.  The heart is normal in size.  The lungs are clear.  There is no pneumothorax or pleural effusion.  No acute abnormalities in the visualized osseous structures.    IMPRESSION:  Clear lungs.    --- End of Report ---           SMILEY GALLAGHER MD; Resident Radiologist  This document has been electronically signed.  LEONARD MARTÍNEZ MD; Attending Radiologist  This document has been electronically signed. Jul 2 2024  3:20PM (07-02-24 @ 14:29)  Xray Chest 1 View- PORTABLE-Urgent:   ACC: [id number] EXAM:  XR CHEST PORTABLE URGENT 1V   ORDERED BY: SHADY MCGOVERN     PROCEDURE DATE:  06/25/2024          INTERPRETATION:  EXAMINATION: XR CHEST URGENT    CLINICAL INDICATION: dyspnea    TECHNIQUE: Single frontal, portable view of the chest was obtained.    COMPARISON: Chest x-ray 6/21/2024.    FINDINGS:  Partially visualized bilateral krishna and screw fixation of the   thoracolumbar spine  The heart is similar in size.  No focal consolidation.  There is no pneumothorax. Trace left pleural effusion.  No acute abnormalities in the visualized osseous structures.    IMPRESSION:  No focal consolidation.    --- End of Report ---           SMILEY GALLAGHER MD; Resident Radiologist  This document has been electronically signed.  LEONARD MARTÍNEZ MD; Attending Radiologist  This document has been electronically signed. Jun 25 2024  2:55PM (06-25-24 @ 11:05)

## 2024-07-03 NOTE — PROGRESS NOTE ADULT - ASSESSMENT
77 yo male with PMH of CAD s/p PCI x3 with most recent stent 6/12/24 on Plavix, chronic Afib on eliquis, orthostatic hypotension on midodrine, PAD, neuroendocrine tumor with RT currently on hold, recent admission for dx cath on 6/24/24 who presents from PCP's office for hypotension despite taking AM midodrine dose, admitted for symptomatic hypotension and RAZIA.

## 2024-07-03 NOTE — PHYSICAL THERAPY INITIAL EVALUATION ADULT - THERAPY FREQUENCY, PT EVAL
Pt states that new bleeding noticed. Multiple 4x4s applied with sandbag at bedside also applied to wound.       Catie Johnston RN  03/02/20 [id number] 1-2x/week

## 2024-07-03 NOTE — PHYSICAL THERAPY INITIAL EVALUATION ADULT - PERTINENT HX OF CURRENT PROBLEM, REHAB EVAL
Pt is a 79yo M PMHx of CAD s/p PCI x3 with most recent stent 6/12/24 on Plavix, chronic Afib on eliquis, orthostatic hypotension on midodrine, PAD, neuroendocrine tumor with RT currently on hold, recent admission for dx cath on 6/24/24 p/w hypotension despite taking AM midodrine dose,   US ABD: Innumerable intrahepatic echogenic masses consistent with known metastatic neuroendocrine tumor.  CXR: Clear lungs.

## 2024-07-03 NOTE — CONSULT NOTE ADULT - ASSESSMENT
g78 yo male with PMH of CAD s/p PCI x3 with most recent stent 6/12/24 on Plavix, chronic Afib on eliquis, orthostatic hypotension on midodrine, PAD, 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 Patient states since discharge on this past Saturday, he continued to feel ongoing generalized weakness and dizziness with positional changes despite compliance with home midodrine 15mg TID and holding torsemide as instructed as of day PTA . Admits to poor PO intake of fluids/solute due to ongoing issues with poor appetite and nausea with meals which is not new. In the ED, vitals notable for SBP 92-11s. Labs notable for elevated BUN/Cr 31/1.59 (from 30/1.18 on day of discharge 6/29/24). CXR with clear lungs. Abd US with innumerable intrahepatic echogenic masses consistent with known metastatic neuroendocrine tumor. Admitted to medicine for symptomatic hypotension and RAZIA.      1- RAZIA   2- hypotension   3- hx chf   4- pericarditis     cr abn in setting of hypotension ATN however overall cr is improving   dc SGLT 2 inh as well as diuretics for now   LR to cont   monitor for diarrhea given also on colchicine   midodrine 15-20 mg tid   strict I/O  orthostatics daily   d/w cards

## 2024-07-03 NOTE — PATIENT PROFILE ADULT - FALL HARM RISK - HARM RISK INTERVENTIONS
Assistance with ambulation/Assistance OOB with selected safe patient handling equipment/Communicate Risk of Fall with Harm to all staff/Discuss with provider need for PT consult/Monitor gait and stability/Provide patient with walking aids - walker, cane, crutches/Reinforce activity limits and safety measures with patient and family/Sit up slowly, dangle for a short time, stand at bedside before walking/Tailored Fall Risk Interventions/Visual Cue: Yellow wristband and red socks/Bed in lowest position, wheels locked, appropriate side rails in place/Call bell, personal items and telephone in reach/Instruct patient to call for assistance before getting out of bed or chair/Non-slip footwear when patient is out of bed/Banks to call system/Physically safe environment - no spills, clutter or unnecessary equipment/Purposeful Proactive Rounding/Room/bathroom lighting operational, light cord in reach

## 2024-07-03 NOTE — PROGRESS NOTE ADULT - PROBLEM SELECTOR PLAN 2
on possible superimposed CKD2. his Cr has been labile in last year but overall baseline in 2023 appears to be 1.0-1.2 range per chart review  - BUN/Cr on admission 31/1.59 from 30/1.18 on day of discharge 6/29/24  - hypovolemic on exam  - continue to hold home torsemide which he takes for LE edema, no HF on recent TTE  (has not taken since discharge)  - s/p 1L LR  - check bladder scan to r/o component of retention  - renally dose meds to GFR, avoid nephrotoxic agents

## 2024-07-03 NOTE — PHYSICAL THERAPY INITIAL EVALUATION ADULT - MANUAL MUSCLE TESTING RESULTS, REHAB EVAL
[id number] [facility] [address] notified. Requests to know if family needs pastoral support. Family declines and  notified. BUE and BLE 4+/5/grossly assessed due to

## 2024-07-03 NOTE — PROGRESS NOTE ADULT - PROBLEM SELECTOR PLAN 1
reported with SBP 80s in PCP office earlier today despite taking midodrine dose in AM. has had ongoing dizziness with positional changes and poor PO intake of fluids/solute due to ongoing lack of appetite  - hypovolemic on exam  - check orthostatics  - continue to hold home torsemide (has not been taking as instructed on d/c)  - c/w home midodrine 15mg TID  - f/u cards recs

## 2024-07-03 NOTE — CONSULT NOTE ADULT - SUBJECTIVE AND OBJECTIVE BOX
DATE OF SERVICE: 07-03-24 @ 10:46    CHIEF COMPLAINT:Patient is a 78y old  Male who presents with a chief complaint of hypotension (02 Jul 2024 18:34)      HISTORY OF PRESENT ILLNESS:HPI:  77 yo male with PMH of CAD s/p PCI x3 with most recent stent 6/12/24 on Plavix, chronic Afib on eliquis, orthostatic hypotension on midodrine, PAD, 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 Patient states since discharge on this past Saturday, he continued to feel ongoing generalized weakness and dizziness with positional changes despite compliance with home midodrine 15mg TID and holding torsemide as instructed. Admits to poor PO intake of fluids/solute due to ongoing issues with poor appetite and nausea with meals which is not new. Denies any fever, chills, chest pain, SOB, cough, abd pain, dysuria nor urinary frequency. Has chronic diarrhea that is unchanged from his baseline.     In the ED, vitals notable for SBP 92-11s. Labs notable for elevated BUN/Cr 31/1.59 (from 30/1.18 on day of discharge 6/29/24). CXR with clear lungs. Abd US with innumerable intrahepatic echogenic masses consistent with known metastatic neuroendocrine tumor. Admitted to medicine for symptomatic hypotension and RAZIA. (02 Jul 2024 18:34)      PAST MEDICAL & SURGICAL HISTORY:  Hypertension      Hypercholesterolemia      PAD (peripheral artery disease)      Neuroendocrine carcinoma      Hepatic carcinoma      Atrial fibrillation and flutter      CAD (coronary artery disease)      S/P knee replacement, bilateral      S/P hip replacement  right hip      History of hernia repair      H/O laminectomy      History of lumbosacral spine surgery      History of cardioversion              MEDICATIONS:  apixaban 5 milliGRAM(s) Oral every 12 hours  clopidogrel Tablet 75 milliGRAM(s) Oral daily  midodrine. 15 milliGRAM(s) Oral three times a day  sotalol. 40 milliGRAM(s) Oral every 24 hours        acetaminophen     Tablet .. 650 milliGRAM(s) Oral every 6 hours PRN  ondansetron Injectable 4 milliGRAM(s) IV Push every 8 hours PRN      allopurinol 100 milliGRAM(s) Oral two times a day  atorvastatin 80 milliGRAM(s) Oral at bedtime  colchicine 0.6 milliGRAM(s) Oral daily    ascorbic acid 500 milliGRAM(s) Oral daily  folic acid 1 milliGRAM(s) Oral daily  lactated ringers. 1000 milliLiter(s) IV Continuous <Continuous>  multivitamin 1 Tablet(s) Oral daily      FAMILY HISTORY:      Non-contributory    SOCIAL HISTORY:    Not an active smoker  Allergies    No Known Allergies    Intolerances    	    REVIEW OF SYSTEMS:  CONSTITUTIONAL: No fever  EYES: No eye pain, visual disturbances, or discharge  ENMT:  No difficulty hearing, tinnitus  NECK: No pain or stiffness  RESPIRATORY: No cough, wheezing,  CARDIOVASCULAR: No chest pain, palpitations, passing out, dizziness, or leg swelling  GASTROINTESTINAL:  No nausea, vomiting, diarrhea or constipation. No melena.  GENITOURINARY: No dysuria, hematuria  NEUROLOGICAL: No stroke like symptoms  SKIN: No burning or lesions   ENDOCRINE: No heat or cold intolerance  MUSCULOSKELETAL: No joint pain or swelling  PSYCHIATRIC: No  anxiety, mood swings  HEME/LYMPH: No bleeding gums  ALLERGY AND IMMUNOLOGIC: No hives or eczema	    All other ROS negative    PHYSICAL EXAM:  T(C): 36.4 (07-03-24 @ 09:14), Max: 37.1 (07-02-24 @ 13:27)  HR: 78 (07-03-24 @ 09:14) (74 - 85)  BP: 102/64 (07-03-24 @ 09:14) (92/64 - 116/51)  RR: 18 (07-03-24 @ 09:14) (15 - 20)  SpO2: 98% (07-03-24 @ 09:14) (97% - 100%)  Wt(kg): --  I&O's Summary      Appearance: Normal	  HEENT:   Normal oral mucosa, EOMI	  Cardiovascular:  S1 S2, No JVD,    Respiratory: Lungs clear to auscultation	  Psychiatry: Alert  Gastrointestinal:  Soft, Non-tender, + BS	  Skin: No rashes   Neurologic: Non-focal  Extremities:  No edema  Vascular: Peripheral pulses palpable    	    	  	  CARDIAC MARKERS:  Labs personally reviewed by me                                  10.0   9.74  )-----------( 272      ( 03 Jul 2024 06:51 )             30.0     07-03    139  |  107  |  26<H>  ----------------------------<  110<H>  4.5   |  19<L>  |  1.38<H>    Ca    9.4      03 Jul 2024 06:51  Phos  3.0     07-03  Mg     1.8     07-03    TPro  7.0  /  Alb  3.1<L>  /  TBili  0.5  /  DBili  x   /  AST  117<H>  /  ALT  81<H>  /  AlkPhos  307<H>  07-02          EKG: Personally reviewed by me - NSR with PVCs at 73 bpm  Radiology: Personally reviewed by me -     < from: Xray Chest 1 View- PORTABLE-Urgent (Xray Chest 1 View- PORTABLE-Urgent .) (07.02.24 @ 14:29) >  Partially visualized bilateral krishna and pedicle screw fixation of the   spine.  The heart is normal in size.  The lungs are clear.  There is no pneumothorax or pleural effusion.  No acute abnormalities in the visualized osseous structures.    IMPRESSION:  Clear lungs.    < end of copied text >  < from: TTE Limited W or WO Ultrasound Enhancing Agent (06.28.24 @ 13:44) >   1. No pericardial effusion seen.   2. Compared to the transthoracic echocardiogram performed on 10/31/2023, there have been no significant interval changes.    < end of copied text >  < from: TTE W or WO Ultrasound Enhancing Agent (10.25.23 @ 08:30) >   1. Left ventricular cavity is normal.Left ventricular wall thickness is mildly increased. Left ventricular systolic function is normal with an ejection fraction of 64 % by Avila's method of disks. There are no regional wall motion abnormalities seen.   2. Normal left ventricular diastolic function, with normal filling pressure.   3. Normal right ventricular cavity size, wall thickness, and systolic function.   4. The left atrium is moderately dilated in size.   5. No significant valvular disease.   6. No pericardial effusion seen.   7. No prior echocardiogram is available for comparison.    Assessment /Plan:     77 yo male with PMH of CAD s/p PCI x3 with most recent stent 6/12/24 on Plavix, chronic Afib on eliquis, orthostatic hypotension on midodrine, PAD, 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 Patient states since discharge on this past Saturday, he continued to feel ongoing generalized weakness and dizziness with positional changes despite compliance with home midodrine 15mg TID and holding torsemide as instructed. Admits to poor PO intake of fluids/solute due to ongoing issues with poor appetite and nausea with meals which is not new. Denies any fever, chills, chest pain, SOB, cough, abd pain, dysuria nor urinary frequency. Has chronic diarrhea that is unchanged from his baseline.     78-year-old male multiple medical problems history of hepatocellular carcinoma status post radiation therapy, AF s/p DCCV on Eliquis who was found to be hypotensive following NST. Patient has reported general weakness, fatigue, lightheadedness since being discharged from hospital. Reports mild chest discomfort in midsternal region. Reports dyspnea with minimal exertion. Also reports significant lack of appetite and poor PO intake. No dark or bloody stool, nausea or vomiting.       Problem/Plan - 1:  ·  Problem: Hypotension  ·  Plan: Continue to hold home torsemide. Cont home Midodrine 15mg PO TID. C/w IVF.  - Patient presents with hypotension and also RAZIA. Has known orthostatic hypotension.   - Patient and wife report decreased PO intake likely 2/2 malignancy.  - UA negative for bacteria  - Check Orthos  - Appreciate PT recs     Problem/Plan - 2:  ·  Problem: CAD.   ·  Plan: Recent PCI to pLAD in June 2023  - ECG non-ischemic  - Trop as noted above  - c/w Plavix and statin  -s/p cath as noted above     Problem/Plan - 3:  ·  Problem: Atrial Fibrillation  - s/p succesful DCCV 10/31  - c/w Eliquis 5mg BID   - C/w of Sotalol 40mg PO daily (qTC wnl)    Problem/Plan - 4:  ·  Problem: Pericarditis.   ·  Plan: Chest pain now improved since last admission.  - c/w colchicine 0.6mg PO daily      OP follow up with Dr. Hameed    Differential diagnosis and plan of care discussed with patient after the evaluation. Counseling on diet, nutritional counseling, weight management, exercise and medication compliance was done.   Advanced care planning/advanced directives discussed with patient/family. DNR status including forceful chest compressions to attempt to restart the heart, ventilator support/artificial breathing, electric shock, artificial nutrition, health care proxy, Molst form all discussed with pt. Pt wishes to consider. More than fifteen minutes spent on discussing advanced directives.     Sulma Espinosa   Hank Hayes DO [facility]  Cardiovascular Medicine  [address] [place] [address] Dr, [address]  Available for call or text via [employer]  Office [phone]   DATE OF SERVICE: 07-03-24 @ 10:46    CHIEF COMPLAINT:Patient is a 78y old  Male who presents with a chief complaint of hypotension (02 Jul 2024 18:34)      HISTORY OF PRESENT ILLNESS:HPI:  77 yo male with PMH of CAD s/p PCI x3 with most recent stent 6/12/24 on Plavix, chronic Afib on eliquis, orthostatic hypotension on midodrine, PAD, 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 Patient states since discharge on this past Saturday, he continued to feel ongoing generalized weakness and dizziness with positional changes despite compliance with home midodrine 15mg TID and holding torsemide as instructed. Admits to poor PO intake of fluids/solute due to ongoing issues with poor appetite and nausea with meals which is not new. Denies any fever, chills, chest pain, SOB, cough, abd pain, dysuria nor urinary frequency. Has chronic diarrhea that is unchanged from his baseline.     In the ED, vitals notable for SBP 92-11s. Labs notable for elevated BUN/Cr 31/1.59 (from 30/1.18 on day of discharge 6/29/24). CXR with clear lungs. Abd US with innumerable intrahepatic echogenic masses consistent with known metastatic neuroendocrine tumor. Admitted to medicine for symptomatic hypotension and RAZIA. (02 Jul 2024 18:34)      PAST MEDICAL & SURGICAL HISTORY:  Hypertension      Hypercholesterolemia      PAD (peripheral artery disease)      Neuroendocrine carcinoma      Hepatic carcinoma      Atrial fibrillation and flutter      CAD (coronary artery disease)      S/P knee replacement, bilateral      S/P hip replacement  right hip      History of hernia repair      H/O laminectomy      History of lumbosacral spine surgery      History of cardioversion              MEDICATIONS:  apixaban 5 milliGRAM(s) Oral every 12 hours  clopidogrel Tablet 75 milliGRAM(s) Oral daily  midodrine. 15 milliGRAM(s) Oral three times a day  sotalol. 40 milliGRAM(s) Oral every 24 hours        acetaminophen     Tablet .. 650 milliGRAM(s) Oral every 6 hours PRN  ondansetron Injectable 4 milliGRAM(s) IV Push every 8 hours PRN      allopurinol 100 milliGRAM(s) Oral two times a day  atorvastatin 80 milliGRAM(s) Oral at bedtime  colchicine 0.6 milliGRAM(s) Oral daily    ascorbic acid 500 milliGRAM(s) Oral daily  folic acid 1 milliGRAM(s) Oral daily  lactated ringers. 1000 milliLiter(s) IV Continuous <Continuous>  multivitamin 1 Tablet(s) Oral daily      FAMILY HISTORY:      Non-contributory    SOCIAL HISTORY:    Not an active smoker  Allergies    No Known Allergies    Intolerances    	    REVIEW OF SYSTEMS:  CONSTITUTIONAL: No fever  EYES: No eye pain, visual disturbances, or discharge  ENMT:  No difficulty hearing, tinnitus  NECK: No pain or stiffness  RESPIRATORY: No cough, wheezing,  CARDIOVASCULAR: No chest pain, palpitations, passing out, dizziness, or leg swelling  GASTROINTESTINAL:  No nausea, vomiting, diarrhea or constipation. No melena.  GENITOURINARY: No dysuria, hematuria  NEUROLOGICAL: No stroke like symptoms  SKIN: No burning or lesions   ENDOCRINE: No heat or cold intolerance  MUSCULOSKELETAL: No joint pain or swelling  PSYCHIATRIC: No  anxiety, mood swings  HEME/LYMPH: No bleeding gums  ALLERGY AND IMMUNOLOGIC: No hives or eczema	    All other ROS negative    PHYSICAL EXAM:  T(C): 36.4 (07-03-24 @ 09:14), Max: 37.1 (07-02-24 @ 13:27)  HR: 78 (07-03-24 @ 09:14) (74 - 85)  BP: 102/64 (07-03-24 @ 09:14) (92/64 - 116/51)  RR: 18 (07-03-24 @ 09:14) (15 - 20)  SpO2: 98% (07-03-24 @ 09:14) (97% - 100%)  Wt(kg): --  I&O's Summary      Appearance: Normal	  HEENT:   Normal oral mucosa, EOMI	  Cardiovascular:  S1 S2, No JVD,    Respiratory: Lungs clear to auscultation	  Psychiatry: Alert  Gastrointestinal:  Soft, Non-tender, + BS	  Skin: No rashes   Neurologic: Non-focal  Extremities:  No edema  Vascular: Peripheral pulses palpable    	    	  	  CARDIAC MARKERS:  Labs personally reviewed by me                                  10.0   9.74  )-----------( 272      ( 03 Jul 2024 06:51 )             30.0     07-03    139  |  107  |  26<H>  ----------------------------<  110<H>  4.5   |  19<L>  |  1.38<H>    Ca    9.4      03 Jul 2024 06:51  Phos  3.0     07-03  Mg     1.8     07-03    TPro  7.0  /  Alb  3.1<L>  /  TBili  0.5  /  DBili  x   /  AST  117<H>  /  ALT  81<H>  /  AlkPhos  307<H>  07-02          EKG: Personally reviewed by me - NSR with PVCs at 73 bpm  Radiology: Personally reviewed by me -     < from: Xray Chest 1 View- PORTABLE-Urgent (Xray Chest 1 View- PORTABLE-Urgent .) (07.02.24 @ 14:29) >  Partially visualized bilateral krishna and pedicle screw fixation of the   spine.  The heart is normal in size.  The lungs are clear.  There is no pneumothorax or pleural effusion.  No acute abnormalities in the visualized osseous structures.    IMPRESSION:  Clear lungs.    < end of copied text >  < from: TTE Limited W or WO Ultrasound Enhancing Agent (06.28.24 @ 13:44) >   1. No pericardial effusion seen.   2. Compared to the transthoracic echocardiogram performed on 10/31/2023, there have been no significant interval changes.    < end of copied text >  < from: TTE W or WO Ultrasound Enhancing Agent (10.25.23 @ 08:30) >   1. Left ventricular cavity is normal.Left ventricular wall thickness is mildly increased. Left ventricular systolic function is normal with an ejection fraction of 64 % by Avila's method of disks. There are no regional wall motion abnormalities seen.   2. Normal left ventricular diastolic function, with normal filling pressure.   3. Normal right ventricular cavity size, wall thickness, and systolic function.   4. The left atrium is moderately dilated in size.   5. No significant valvular disease.   6. No pericardial effusion seen.   7. No prior echocardiogram is available for comparison.                Assessment /Plan:     77 yo male with PMH of CAD s/p PCI x3 with most recent stent 6/12/24 on Plavix, chronic Afib on eliquis, orthostatic hypotension on midodrine, PAD, 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 Patient states since discharge on this past Saturday, he continued to feel ongoing generalized weakness and dizziness with positional changes despite compliance with home midodrine 15mg TID and holding torsemide as instructed. Admits to poor PO intake of fluids/solute due to ongoing issues with poor appetite and nausea with meals which is not new. Denies any fever, chills, chest pain, SOB, cough, abd pain, dysuria nor urinary frequency. Has chronic diarrhea that is unchanged from his baseline.     78-year-old male multiple medical problems history of hepatocellular carcinoma status post radiation therapy, AF s/p DCCV on Eliquis who was found to be hypotensive following NST. Patient has reported general weakness, fatigue, lightheadedness since being discharged from hospital. Reports mild chest discomfort in midsternal region. Reports dyspnea with minimal exertion. Also reports significant lack of appetite and poor PO intake. No dark or bloody stool, nausea or vomiting.       Problem/Plan - 1:  ·  Problem: Hypotension  ·  Plan: Continue to hold home torsemide and Jardiance. Cont home Midodrine 15mg PO TID. C/w IVF.  - Patient presents with hypotension and also RAZIA. Has known orthostatic hypotension.   - Patient and wife report decreased PO intake likely 2/2 malignancy.  - Check Orthos  - Appreciate PT recs     Problem/Plan - 2:  ·  Problem: CAD.   ·  Plan: Recent PCI to pLAD in June 2023  - ECG non-ischemic  - c/w Plavix and statin     Problem/Plan - 3:  ·  Problem: Atrial Fibrillation  - s/p succesful DCCV 10/31  - c/w Eliquis 5mg BID   - C/w of Sotalol 40mg PO daily (qTC wnl)    Problem/Plan - 4:  ·  Problem: Pericarditis.   ·  Plan: Chest pain now improved since last admission.  - c/w colchicine 0.6mg PO daily      OP follow up with Dr. Hameed            Differential diagnosis and plan of care discussed with patient after the evaluation. Counseling on diet, nutritional counseling, weight management, exercise and medication compliance was done.   Advanced care planning/advanced directives discussed with patient/family. DNR status including forceful chest compressions to attempt to restart the heart, ventilator support/artificial breathing, electric shock, artificial nutrition, health care proxy, Molst form all discussed with pt. Pt wishes to consider. More than fifteen minutes spent on discussing advanced directives.     Sulma Espinosa [facility]  Hank Hayes, DO Located within [facility]  Cardiovascular Medicine  [address], [address]  Available for call or text via [employer] TE[employer]  Office [phone]

## 2024-07-03 NOTE — PHYSICAL THERAPY INITIAL EVALUATION ADULT - BED MOBILITY TRAINING, PT EVAL
GOAL: Pt will perform all bed mobility independently to return to prior level of function in 2 weeks.

## 2024-07-03 NOTE — PATIENT PROFILE ADULT - FUNCTIONAL ASSESSMENT - BASIC MOBILITY 6.
2-calculated by average/Not able to assess (calculate score using [employer] averaging method)

## 2024-07-04 NOTE — CONSULT NOTE ADULT - ASSESSMENT
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 His care is managed at [facility]. S/p radiation therapy 10/2023. Denies ever having flushing. Has had diarrhea for a long time. Reports that significant weakness began after radiation therapy.    He reports having had orthostatic lightheadedness since his last hospitalization here (6/21-29) for which he was hospitalized for failing his cardiac stress test and had RAINES. His torsemide (which he has been taking for a long time for his BLE swelling) was stopped. Jardiance was also discontinued on discharge (he thinks indication was for cardiac). He has continued taking midodrine 15mg TID at home since discharge.    Orthostatic hypotension is not a classic symptom of NET. Generally serotonin and its metabolites from NET classically causes flushing and diarrhea. Metastatic tumors in the liver can cause hepatomegaly and early satiety.    Recommendations:  - reasonable to check for possible adrenal insufficiency but does not have significant risk factors. Check serum AM cortisol at 8am tomorrow  - follow up FT4, TSH  - recommend reach out to patient's doctors at [facility] to obtain more detailed history regarding neuroendocrine tumor diagnosis and workup.    Discussed with primary team    Jean Juan MD  Endocrine Fellow  Can be reached via teams. For follow up questions, discharge recommendations, or new consults, please call answering service at [phone] (weekdays); [phone] (nights/weekends). 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 His care is managed at [facility]. S/p radiation therapy 10/2023. Denies ever having flushing. Has had diarrhea for a long time. Reports that significant weakness began after radiation therapy.    He reports having had orthostatic lightheadedness since his last hospitalization here (6/21-29) for which he was hospitalized for failing his cardiac stress test and had RAINES. His torsemide (which he has been taking for a long time for his BLE swelling) was stopped. Jardiance was also discontinued on discharge (he thinks indication was for cardiac). He has continued taking midodrine 15mg TID at home since discharge.    Orthostatic hypotension is not a classic symptom of NET but can be infrequently seen with NET.    Recommendations:  - reasonable to check for possible adrenal insufficiency but does not have significant risk factors. Check serum AM cortisol at 8am tomorrow  - follow up FT4, TSH  - recommend reach out to patient's doctors at [facility] to obtain more detailed history regarding neuroendocrine tumor diagnosis and workup.  - please recheck orthostatic vitals; if positive, can consider orthostatic hypotension as a possible symptom of hormonal excess and treat with octreotide analog. Will need to corroborate with doctors at [facility] which therapies has been tried in the past and if there are any contraindications for use.  - optimize PO intake     Discussed with primary team    Jean Juan MD  Endocrine Fellow  Can be reached via teams. For follow up questions, discharge recommendations, or new consults, please call answering service at [phone] (weekdays); [phone] (nights/weekends).

## 2024-07-04 NOTE — CONSULT NOTE ADULT - ATTENDING COMMENTS
Pt with neuroendocrine tumor managed at [facility], with recent development of orthostatic hypotension Pt with neuroendocrine tumor managed at [facility], with recent development of orthostatic hypotension.  Recommend obtaining records on prior NET evaluation from [facility].  Check AM cortisol  Pt also with T2m based on prior A1c.  serum glucose values in target range  Check A1c, if >6.5, start POC glucose before meals and at bedtime    Trini Franco MD  On 7/4/24: via Teams or pager    Other times:  Diabetes team: [phone]   or email [email or web address]

## 2024-07-04 NOTE — PROGRESS NOTE ADULT - PROBLEM SELECTOR PLAN 1
reported with SBP 80s in PCP office earlier today despite taking midodrine dose in AM. has had ongoing dizziness with positional changes and poor PO intake of fluids/solute due to ongoing lack of appetite  - hypovolemic on exam  - orthostatics +ve  - continue to hold home torsemide (has not been taking as instructed on d/c)  - c/w home midodrine 15mg TID  - Stockigns up to knee. min 60 seconds each between supine > sitting > standing   - f/u cards recs

## 2024-07-04 NOTE — PROGRESS NOTE ADULT - PROBLEM SELECTOR PLAN 2
on possible superimposed CKD2. his Cr has been labile in last year but overall baseline in 2023 appears to be 1.0-1.2 range per chart review  - BUN/Cr on admission 31/1.59 from 30/1.18 on day of discharge 6/29/24  - hypovolemic on exam  - continue to hold home torsemide which he takes for LE edema, no HF on recent TTE  (has not taken since discharge)  - s/p 1L LR  - US abd no hydro  - renally dose meds to GFR, avoid nephrotoxic agents

## 2024-07-04 NOTE — PROGRESS NOTE ADULT - PROBLEM SELECTOR PLAN 6
known metastatic pancreatic neuroendocrine tumor  - follows with Dr. Sophia Prasad at [facility] [place]  - radiation therapy currently on hold given recent cardiac issues awaiting cards clearance to resume  - outpatient f/u with Dr. Prasad upon discharge  - consider endo consult for possible hypotension from adrenal gland dysfunction iron pills/Prenatal Vitamins/Aspirin/Other

## 2024-07-04 NOTE — PROGRESS NOTE ADULT - SUBJECTIVE AND OBJECTIVE BOX
DATE OF SERVICE: 07-04-24 @ 10:28    Patient is a 78y old  Male who presents with a chief complaint of hypotension (03 Jul 2024 23:18)      INTERVAL HISTORY: Feels ok.     REVIEW OF SYSTEMS:  CONSTITUTIONAL: No weakness  EYES/ENT: No visual changes;  No throat pain   NECK: No pain or stiffness  RESPIRATORY: No cough, wheezing; No shortness of breath  CARDIOVASCULAR: No chest pain or palpitations  GASTROINTESTINAL: No abdominal  pain. No nausea, vomiting, or hematemesis  GENITOURINARY: No dysuria, frequency or hematuria  NEUROLOGICAL: No stroke like symptoms  SKIN: No rashes  	  MEDICATIONS:  midodrine. 15 milliGRAM(s) Oral three times a day  sotalol. 40 milliGRAM(s) Oral every 24 hours        PHYSICAL EXAM:  T(C): 36.8 (07-04-24 @ 04:24), Max: 36.8 (07-04-24 @ 04:24)  HR: 90 (07-04-24 @ 06:28) (62 - 90)  BP: 113/64 (07-04-24 @ 06:28) (96/60 - 113/64)  RR: 18 (07-04-24 @ 04:24) (18 - 18)  SpO2: 97% (07-04-24 @ 04:24) (97% - 99%)  Wt(kg): --  I&O's Summary    03 Jul 2024 07:01  -  04 Jul 2024 07:00  --------------------------------------------------------  IN: 740 mL / OUT: 900 mL / NET: -160 mL    04 Jul 2024 07:01  -  04 Jul 2024 10:28  --------------------------------------------------------  IN: 240 mL / OUT: 0 mL / NET: 240 mL        Weight (kg): 98.7 (07-03 @ 17:43)    Appearance: In no distress	  HEENT:    PERRL, EOMI	  Cardiovascular:  S1 S2, No JVD  Respiratory: Lungs clear to auscultation	  Gastrointestinal:  Soft, Non-tender, + BS	  Vascularature:  No edema of LE  Psychiatric: Appropriate affect   Neuro: no acute focal deficits                               10.0   9.74  )-----------( 272      ( 03 Jul 2024 06:51 )             30.0     07-03    139  |  107  |  26<H>  ----------------------------<  110<H>  4.5   |  19<L>  |  1.38<H>    Ca    9.4      03 Jul 2024 06:51  Phos  3.0     07-03  Mg     1.8     07-03    TPro  7.0  /  Alb  3.1<L>  /  TBili  0.5  /  DBili  x   /  AST  117<H>  /  ALT  81<H>  /  AlkPhos  307<H>  07-02        Labs personally reviewed      ASSESSMENT/PLAN: 	    79 yo male with PMH of CAD s/p PCI x3 with most recent stent 6/12/24 on Plavix, chronic Afib on eliquis, orthostatic hypotension on midodrine, PAD, 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 Patient states since discharge on this past Saturday, he continued to feel ongoing generalized weakness and dizziness with positional changes despite compliance with home midodrine 15mg TID and holding torsemide as instructed. Admits to poor PO intake of fluids/solute due to ongoing issues with poor appetite and nausea with meals which is not new. Denies any fever, chills, chest pain, SOB, cough, abd pain, dysuria nor urinary frequency. Has chronic diarrhea that is unchanged from his baseline.     78-year-old male multiple medical problems history of hepatocellular carcinoma status post radiation therapy, AF s/p DCCV on Eliquis who was found to be hypotensive following NST. Patient has reported general weakness, fatigue, lightheadedness since being discharged from hospital. Reports mild chest discomfort in midsternal region. Reports dyspnea with minimal exertion. Also reports significant lack of appetite and poor PO intake. No dark or bloody stool, nausea or vomiting.       Problem/Plan - 1:  ·  Problem: Hypotension  ·  Plan: Continue to hold home torsemide and Jardiance. Cont home Midodrine 15mg PO TID. C/w IVF as noted above.  - Patient presents with hypotension and also RAZIA. Has known orthostatic hypotension.   - Patient and wife report decreased PO intake likely 2/2 malignancy.  - Appreciate PT recs  - Orthos still positive: recommend NS at 50ml/hr x 8 hours     Problem/Plan - 2:  ·  Problem: CAD.   ·  Plan: Recent PCI to pLAD in June 2023  - ECG non-ischemic  - c/w Plavix and statin     Problem/Plan - 3:  ·  Problem: Atrial Fibrillation  - s/p succesful DCCV 10/31  - c/w Eliquis 5mg BID   - C/w of Sotalol 40mg PO daily (qTC wnl)    Problem/Plan - 4:  ·  Problem: Pericarditis.   ·  Plan: Chest pain now improved since last admission.  - c/w colchicine 0.6mg PO daily      OP follow up with Dr. Yoseph Espinosa, AG-NP   Hank Hayes DO [facility]  Cardiovascular Medicine  [address] [place] [address], [address]  Available through call or text on [employer]  Office: [phone]

## 2024-07-04 NOTE — PROGRESS NOTE ADULT - SUBJECTIVE AND OBJECTIVE BOX
SUBJECTIVE / OVERNIGHT EVENTS:  Today is hospital day 2d. There are no new issues or overnight events.   Did not endorse any headache, lightheadedness, vertigo, shortness of breathe, cough, chest pain, palpitations, tachycardia, abdominal pain, nausea, vomiting, diarrhea or constipation currently    HPI:  79 yo male with PMH of CAD s/p PCI x3 with most recent stent 6/12/24 on Plavix, chronic Afib on eliquis, orthostatic hypotension on midodrine, PAD, 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 Patient states since discharge on this past Saturday, he continued to feel ongoing generalized weakness and dizziness with positional changes despite compliance with home midodrine 15mg TID and holding torsemide as instructed. Admits to poor PO intake of fluids/solute due to ongoing issues with poor appetite and nausea with meals which is not new. Denies any fever, chills, chest pain, SOB, cough, abd pain, dysuria nor urinary frequency. Has chronic diarrhea that is unchanged from his baseline.     In the ED, vitals notable for SBP 92-11s. Labs notable for elevated BUN/Cr 31/1.59 (from 30/1.18 on day of discharge 6/29/24). CXR with clear lungs. Abd US with innumerable intrahepatic echogenic masses consistent with known metastatic neuroendocrine tumor. Admitted to medicine for symptomatic hypotension and RAZIA. (02 Jul 2024 18:34)    MEDICATIONS  (STANDING):  allopurinol 100 milliGRAM(s) Oral two times a day  apixaban 5 milliGRAM(s) Oral every 12 hours  ascorbic acid 500 milliGRAM(s) Oral daily  atorvastatin 80 milliGRAM(s) Oral at bedtime  clopidogrel Tablet 75 milliGRAM(s) Oral daily  colchicine 0.6 milliGRAM(s) Oral daily  folic acid 1 milliGRAM(s) Oral daily  lactated ringers. 1000 milliLiter(s) (75 mL/Hr) IV Continuous <Continuous>  midodrine. 15 milliGRAM(s) Oral three times a day  multivitamin 1 Tablet(s) Oral daily  sodium chloride 0.9%. 1000 milliLiter(s) (50 mL/Hr) IV Continuous <Continuous>  sotalol. 40 milliGRAM(s) Oral every 24 hours    MEDICATIONS  (PRN):  acetaminophen     Tablet .. 650 milliGRAM(s) Oral every 6 hours PRN Temp greater or equal to 38C (100.4F), Mild Pain (1 - 3)  ondansetron Injectable 4 milliGRAM(s) IV Push every 8 hours PRN Nausea and/or Vomiting    HOME MEDICATIONS:  allopurinol 100 mg oral tablet: 1 tab(s) orally 2 times a day  ascorbic acid 500 mg oral tablet: 1 tab(s) orally once a day  clopidogrel 75 mg oral tablet: 1 tab(s) orally once a day  colchicine 0.6 mg oral tablet: 1 tab(s) orally once a day  Eliquis 5 mg oral tablet: 1 tab(s) orally 2 times a day  folic acid 1 mg oral tablet: 1 tab(s) orally once a day  Jardiance 10 mg oral tablet: 1 tab(s) orally once a day  midodrine 10 mg oral tablet: 1 tab(s) orally 3 times a day  midodrine 5 mg oral tablet: 1 tab(s) orally 3 times a day  Multiple Vitamins oral tablet: 1 tab(s) orally once a day  rosuvastatin 40 mg oral tablet: 1 tab(s) orally once a day  sotalol 80 mg oral tablet: 0.5 tab(s) orally once a day  Zetia 10 mg oral tablet: 1 tab(s) orally once a day    PHYSICAL EXAM  Vital Signs Last 24 Hrs  T(C): 36.8 (04 Jul 2024 04:24), Max: 36.8 (04 Jul 2024 04:24)  T(F): 98.2 (04 Jul 2024 04:24), Max: 98.2 (04 Jul 2024 04:24)  HR: 86 (04 Jul 2024 10:18) (62 - 90)  BP: 100/62 (04 Jul 2024 10:18) (96/60 - 113/64)  BP(mean): --  RR: 18 (04 Jul 2024 04:24) (18 - 18)  SpO2: 97% (04 Jul 2024 04:24) (97% - 99%)    Parameters below as of 04 Jul 2024 04:24  Patient On (Oxygen Delivery Method): room air        07-03-24 @ 07:01  -  07-04-24 @ 07:00  --------------------------------------------------------  IN: 740 mL / OUT: 900 mL / NET: -160 mL    07-04-24 @ 07:01  -  07-04-24 @ 10:58  --------------------------------------------------------  IN: 240 mL / OUT: 0 mL / NET: 240 mL      CONSTITUTIONAL: Well-groomed, in no apparent distress;  EYES: No conjunctival or scleral injection, non-icteric;  ENMT: No external nasal lesions; Normal outer ears;  NECK: Trachea midline;  RESPIRATORY: Normal respiratory effort; Decreased breathe sounds bilaterally without wheeze/rhonchi/rales;  CARDIOVASCULAR: Regular rate and rhythm;  GASTROINTESTINAL: Non-distended; No palpable masses; No rebound/guarding;  EXTREMITIES:  No lower extremity edema;  NEUROLOGY: A+O to person, place, and time; Does respond to commands appropriately;  PSYCHIATRY: Mood and Affect appropriate    LABS:                        10.2   9.66  )-----------( 313      ( 04 Jul 2024 10:37 )             31.5     07-03    139  |  107  |  26<H>  ----------------------------<  110<H>  4.5   |  19<L>  |  1.38<H>    Ca    9.4      03 Jul 2024 06:51  Phos  3.0     07-03  Mg     1.8     07-03    TPro  7.0  /  Alb  3.1<L>  /  TBili  0.5  /  DBili  x   /  AST  117<H>  /  ALT  81<H>  /  AlkPhos  307<H>  07-02          Urinalysis Basic - ( 03 Jul 2024 06:51 )    Color: x / Appearance: x / SG: x / pH: x  Gluc: 110 mg/dL / Ketone: x  / Bili: x / Urobili: x   Blood: x / Protein: x / Nitrite: x   Leuk Esterase: x / RBC: x / WBC x   Sq Epi: x / Non Sq Epi: x / Bacteria: x            RADIOLOGY & ADDITIONAL TESTS:  EKG  12 Lead ECG:   Ventricular Rate 84 BPM    Atrial Rate 84 BPM    P-R Interval 180 ms    QRS Duration 98 ms    Q-T Interval 412 ms    QTC Calculation(Bazett) 486 ms    P Axis 64 degrees    R Axis 44 degrees    T Axis 40 degrees    Diagnosis Line SINUS RHYTHM WITH PREMATURE SUPRAVENTRICULAR COMPLEXES  LOW VOLTAGE QRS  SEPTAL INFARCT (CITED ON OR BEFORE 03-JUL-2024)  ABNORMAL ECG  WHEN COMPARED WITH ECG OF 03-JUL-2024 04:44, (UNCONFIRMED)  NO SIGNIFICANT CHANGE WAS FOUND  Confirmed by Agustin Nogueira MD ([id number]) on 7/3/2024 9:29:20 AM (07-03-24 @ 04:45)  12 Lead ECG:   Ventricular Rate 82 BPM    Atrial Rate 82 BPM    P-R Interval 178 ms    QRS Duration 82 ms    Q-T Interval 408 ms    QTC Calculation(Bazett) 476 ms    P Axis 100 degrees    R Axis 38 degrees    T Axis -25 degrees    Diagnosis Line SINUS RHYTHM WITHPREMATURE ATRIAL COMPLEXES  LOW VOLTAGE QRS  CANNOT RULE OUT ANTEROSEPTAL INFARCT , AGE UNDETERMINED  ABNORMAL ECG  WHEN COMPARED WITH ECG OF 02-JUL-2024 13:40, (UNCONFIRMED)  SIGNIFICANT CHANGES HAVE OCCURRED  Confirmed by Agustin Nogueira MD ([id number])on 7/3/2024 9:29:26 AM (07-03-24 @ 04:44)    Xray Chest 1 View- PORTABLE-Urgent:   ACC: [id number] EXAM:  XR CHEST PORTABLE URGENT 1V   ORDERED BY:  JAEL RUIZ     PROCEDURE DATE:  07/02/2024          INTERPRETATION:  EXAMINATION: XR CHEST URGENT    CLINICAL INDICATION: Shortness of breath.    TECHNIQUE: Single frontal, portable view of the chest was obtained.    COMPARISON: Chest x-ray 6/25/2024.    FINDINGS:  Partially visualized bilateral krishna and pedicle screw fixation of the   spine.  The heart is normal in size.  The lungs are clear.  There is no pneumothorax or pleural effusion.  No acute abnormalities in the visualized osseous structures.    IMPRESSION:  Clear lungs.    --- End of Report ---           SMILEY GALLAGHER MD; Resident Radiologist  This document has been electronically signed.  LEONARD MARTÍNEZ MD; Attending Radiologist  This document has been electronically signed. Jul 2 2024  3:20PM (07-02-24 @ 14:29)  Xray Chest 1 View- PORTABLE-Urgent:   ACC: [id number] EXAM:  XR CHEST PORTABLE URGENT 1V   ORDERED BY: SHADY MCGOVERN     PROCEDURE DATE:  06/25/2024          INTERPRETATION:  EXAMINATION: XR CHEST URGENT    CLINICAL INDICATION: dyspnea    TECHNIQUE: Single frontal, portable view of the chest was obtained.    COMPARISON: Chest x-ray 6/21/2024.    FINDINGS:  Partially visualized bilateral krishna and screw fixation of the   thoracolumbar spine  The heart is similar in size.  No focal consolidation.  There is no pneumothorax. Trace left pleural effusion.  No acute abnormalities in the visualized osseous structures.    IMPRESSION:  No focal consolidation.    --- End of Report ---           SMILEY GALLAGHER MD; Resident Radiologist  This document has been electronically signed.  LEONARD MARTÍNEZ MD; Attending Radiologist  This document has been electronically signed. Jun 25 2024  2:55PM (06-25-24 @ 11:05)

## 2024-07-04 NOTE — PROGRESS NOTE ADULT - SUBJECTIVE AND OBJECTIVE BOX
[facility]   [phone]  RENAL FOLLOW UP NOTE  --------------------------------------------------------------------------------  Chief Complaint:    24 hour events/subjective:    seen earlier   denies sob or dizziness when seen but in supine position     PAST HISTORY  --------------------------------------------------------------------------------  No significant changes to PMH, PSH, FHx, SHx, unless otherwise noted    ALLERGIES & MEDICATIONS  --------------------------------------------------------------------------------  Allergies    No Known Allergies    Intolerances      Standing Inpatient Medications  allopurinol 100 milliGRAM(s) Oral two times a day  apixaban 5 milliGRAM(s) Oral every 12 hours  ascorbic acid 500 milliGRAM(s) Oral daily  atorvastatin 80 milliGRAM(s) Oral at bedtime  clopidogrel Tablet 75 milliGRAM(s) Oral daily  colchicine 0.6 milliGRAM(s) Oral daily  folic acid 1 milliGRAM(s) Oral daily  lactated ringers. 1000 milliLiter(s) IV Continuous <Continuous>  midodrine. 15 milliGRAM(s) Oral three times a day  multivitamin 1 Tablet(s) Oral daily  sodium chloride 0.9%. 1000 milliLiter(s) IV Continuous <Continuous>  sotalol. 40 milliGRAM(s) Oral every 24 hours    PRN Inpatient Medications  acetaminophen     Tablet .. 650 milliGRAM(s) Oral every 6 hours PRN  ondansetron Injectable 4 milliGRAM(s) IV Push every 8 hours PRN      REVIEW OF SYSTEMS  --------------------------------------------------------------------------------    Gen: denies  fevers/chills,  CVS: denies chest pain/palpitations  Resp: denies SOB/Cough  GI: Denies N/V/Abd pain  : Denies dysuria    VITALS/PHYSICAL EXAM  --------------------------------------------------------------------------------  T(C): 36.7 (07-04-24 @ 19:51), Max: 36.8 (07-04-24 @ 04:24)  HR: 76 (07-04-24 @ 20:12) (72 - 90)  BP: 116/69 (07-04-24 @ 20:12) (96/64 - 116/69)  RR: 18 (07-04-24 @ 20:12) (18 - 18)  SpO2: 98% (07-04-24 @ 20:12) (96% - 98%)  Wt(kg): --    Weight (kg): 98.7 (07-03-24 @ 17:43)      07-03-24 @ 07:01  -  07-04-24 @ 07:00  --------------------------------------------------------  IN: 740 mL / OUT: 900 mL / NET: -160 mL    07-04-24 @ 07:01  -  07-04-24 @ 20:33  --------------------------------------------------------  IN: 1145 mL / OUT: 450 mL / NET: 695 mL      Physical Exam:  	    	Gen: Non toxic comfortable appearing   	no jvd  	Pulm: decrease bs  no rales or ronchi or wheezing  	CV: RRR, S1S2; no rub  	Abd: +BS, soft, nontender/nondistended  	: No suprapubic tenderness  	UE: Warm, no cyanosis  no clubbing,  no edema; no asterixis  	LE: Warm, no cyanosis  no clubbing, no edema  	Neuro: alert and oriented. speech coherent       LABS/STUDIES  --------------------------------------------------------------------------------              10.2   9.66  >-----------<  313      [07-04-24 @ 10:37]              31.5     139  |  108  |  24  ----------------------------<  80      [07-04-24 @ 10:37]  3.8   |  16  |  1.08        Ca     9.2     [07-04-24 @ 10:37]      Mg     1.8     [07-03-24 @ 06:51]      Phos  3.0     [07-03-24 @ 06:51]            Creatinine Trend:  SCr 1.08 [07-04 @ 10:37]  SCr 1.38 [07-03 @ 06:51]  SCr 1.59 [07-02 @ 14:12]  SCr 1.18 [06-29 @ 07:03]  SCr 1.31 [06-28 @ 10:47]      Urine Creatinine 108      [07-02-24 @ 20:10]  Urine Sodium 17      [07-02-24 @ 20:10]  Urine Urea Nitrogen 730      [07-02-24 @ 20:10]    TSH 2.47      [07-04-24 @ 12:31]

## 2024-07-04 NOTE — CONSULT NOTE ADULT - SUBJECTIVE AND OBJECTIVE BOX
NOTE INCOMPLETE/ IN PROGRESS  *Please wait for attending attestation for official recommendations.     HPI:  79 yo male with PMH of CAD s/p PCI x3 with most recent stent 6/12/24 on Plavix, chronic Afib on eliquis, orthostatic hypotension on midodrine, PAD, 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 Patient states since discharge on this past Saturday, he continued to feel ongoing generalized weakness and dizziness with positional changes despite compliance with home midodrine 15mg TID and holding torsemide as instructed. Admits to poor PO intake of fluids/solute due to ongoing issues with poor appetite and nausea with meals which is not new. Denies any fever, chills, chest pain, SOB, cough, abd pain, dysuria nor urinary frequency. Has chronic diarrhea that is unchanged from his baseline.     In the ED, vitals notable for SBP 92-11s. Labs notable for elevated BUN/Cr 31/1.59 (from 30/1.18 on day of discharge 6/29/24). CXR with clear lungs. Abd US with innumerable intrahepatic echogenic masses consistent with known metastatic neuroendocrine tumor. Admitted to medicine for symptomatic hypotension and RAZIA. (02 Jul 2024 18:34)      Consulted for:    PAST MEDICAL & SURGICAL HISTORY:  Hypertension      Hypercholesterolemia      PAD (peripheral artery disease)      Neuroendocrine carcinoma      Hepatic carcinoma      Atrial fibrillation and flutter      CAD (coronary artery disease)      S/P knee replacement, bilateral      S/P hip replacement  right hip      History of hernia repair      H/O laminectomy      History of lumbosacral spine surgery      History of cardioversion          FAMILY HISTORY:      Social History:    Outpatient Medications:    MEDICATIONS  (STANDING):  allopurinol 100 milliGRAM(s) Oral two times a day  apixaban 5 milliGRAM(s) Oral every 12 hours  ascorbic acid 500 milliGRAM(s) Oral daily  atorvastatin 80 milliGRAM(s) Oral at bedtime  clopidogrel Tablet 75 milliGRAM(s) Oral daily  colchicine 0.6 milliGRAM(s) Oral daily  folic acid 1 milliGRAM(s) Oral daily  lactated ringers. 1000 milliLiter(s) (75 mL/Hr) IV Continuous <Continuous>  midodrine. 15 milliGRAM(s) Oral three times a day  multivitamin 1 Tablet(s) Oral daily  sodium chloride 0.9%. 1000 milliLiter(s) (50 mL/Hr) IV Continuous <Continuous>  sotalol. 40 milliGRAM(s) Oral every 24 hours    MEDICATIONS  (PRN):  acetaminophen     Tablet .. 650 milliGRAM(s) Oral every 6 hours PRN Temp greater or equal to 38C (100.4F), Mild Pain (1 - 3)  ondansetron Injectable 4 milliGRAM(s) IV Push every 8 hours PRN Nausea and/or Vomiting      Allergies    No Known Allergies    Intolerances      Review of Systems:  Constitutional: No fever  Eyes: No blurry vision  Neuro: No tremors  HEENT: No pain  Cardiovascular: No chest pain, palpitations  Respiratory: No SOB, no cough  GI: No nausea, vomiting, abdominal pain  : No dysuria  Skin: no rash  Psych: no depression  Endocrine: no polyuria, polydipsia  Hem/lymph: no swelling  Osteoporosis: no fractures    ALL OTHER SYSTEMS REVIEWED AND NEGATIVE    UNABLE TO OBTAIN    PHYSICAL EXAM:  VITALS: T(C): 36.8 (07-04-24 @ 04:24)  T(F): 98.2 (07-04-24 @ 04:24), Max: 98.2 (07-04-24 @ 04:24)  HR: 86 (07-04-24 @ 10:18) (69 - 90)  BP: 100/62 (07-04-24 @ 10:18) (96/60 - 113/64)  RR:  (18 - 18)  SpO2:  (97% - 99%)  Wt(kg): --  GENERAL: NAD, well-groomed, well-developed  EYES: No proptosis, no lid lag, anicteric  HEENT:  Atraumatic, Normocephalic, moist mucous membranes  THYROID: Normal size, no palpable nodules  RESPIRATORY: Clear to auscultation bilaterally; No rales, rhonchi, wheezing  CARDIOVASCULAR: Regular rate and rhythm; No murmurs; no peripheral edema  GI: Soft, nontender, non distended, normal bowel sounds  SKIN: Dry, intact, No rashes or lesions  MUSCULOSKELETAL: Full range of motion, normal strength  NEURO: sensation intact, extraocular movements intact, no tremor  PSYCH: Alert and oriented x 3, normal affect, normal mood  CUSHING'S SIGNS: no striae      CAPILLARY BLOOD GLUCOSE                                10.2   9.66  )-----------( 313      ( 04 Jul 2024 10:37 )             31.5       07-04    139  |  108  |  24<H>  ----------------------------<  80  3.8   |  16<L>  |  1.08    eGFR: 70    Ca    9.2      07-04  Mg     1.8     07-03  Phos  3.0     07-03    TPro  7.0  /  Alb  3.1<L>  /  TBili  0.5  /  DBili  x   /  AST  117<H>  /  ALT  81<H>  /  AlkPhos  307<H>  07-02      Thyroid Function Tests:      A1C with Estimated Average Glucose Result: 6.5 % (11-17-23 @ 06:38)  A1C with Estimated Average Glucose Result: 6.7 % (10-25-23 @ 06:57)          Radiology:              NOTE INCOMPLETE/ IN PROGRESS  *Please wait for attending attestation for official recommendations.     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 Patient states since discharge on this past Saturday, he continued to feel ongoing generalized weakness and dizziness with positional changes despite compliance with home midodrine 15mg TID and holding torsemide as instructed. Admits to poor PO intake of fluids/solute due to ongoing issues with poor appetite and nausea with meals which is not new. Denies any fever, chills, chest pain, SOB, cough, abd pain, dysuria nor urinary frequency. Has chronic diarrhea that is unchanged from his baseline. Admitted to medicine for symptomatic hypotension and RAZIA. 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 [place] [facility] [place]. S/p radiation therapy 10/2023. Denies ever having flushing. Has had diarrhea for a long time. Reports that significant weakness began after radiation therapy.    He reports having had orthostatic lightheadedness since his last hospitalization here (6/21-29) for which he was hospitalized for failing his cardiac stress test and had RAINES. His torsemide (which he has been taking for a long time for his BLE swelling) was stopped. Jardiance was also discontinued on discharge (he thinks indication was for cardiac). He has continued taking midodrine 15mg TID at home since discharge.    He reports he previously was told some time ago that he has DM but shortly afterwards he was told he doesn't need DM meds anymore.        PAST MEDICAL & SURGICAL HISTORY:  Hypertension      Hypercholesterolemia      PAD (peripheral artery disease)      Neuroendocrine carcinoma      Hepatic carcinoma      Atrial fibrillation and flutter      CAD (coronary artery disease)      S/P knee replacement, bilateral      S/P hip replacement  right hip      History of hernia repair      H/O laminectomy      History of lumbosacral spine surgery      History of cardioversion          FAMILY HISTORY:      Social History:    Outpatient Medications:    MEDICATIONS  (STANDING):  allopurinol 100 milliGRAM(s) Oral two times a day  apixaban 5 milliGRAM(s) Oral every 12 hours  ascorbic acid 500 milliGRAM(s) Oral daily  atorvastatin 80 milliGRAM(s) Oral at bedtime  clopidogrel Tablet 75 milliGRAM(s) Oral daily  colchicine 0.6 milliGRAM(s) Oral daily  folic acid 1 milliGRAM(s) Oral daily  lactated ringers. 1000 milliLiter(s) (75 mL/Hr) IV Continuous <Continuous>  midodrine. 15 milliGRAM(s) Oral three times a day  multivitamin 1 Tablet(s) Oral daily  sodium chloride 0.9%. 1000 milliLiter(s) (50 mL/Hr) IV Continuous <Continuous>  sotalol. 40 milliGRAM(s) Oral every 24 hours    MEDICATIONS  (PRN):  acetaminophen     Tablet .. 650 milliGRAM(s) Oral every 6 hours PRN Temp greater or equal to 38C (100.4F), Mild Pain (1 - 3)  ondansetron Injectable 4 milliGRAM(s) IV Push every 8 hours PRN Nausea and/or Vomiting      Allergies    No Known Allergies    Intolerances      Review of Systems:  Constitutional: No fever  Eyes: No blurry vision  Neuro: No tremors  HEENT: No pain  Cardiovascular: No chest pain, palpitations  Respiratory: No SOB, no cough  GI: No nausea, vomiting, abdominal pain  : No dysuria  Skin: no rash  Psych: no depression  Endocrine: no polyuria, polydipsia  Hem/lymph: no swelling  Osteoporosis: no fractures    ALL OTHER SYSTEMS REVIEWED AND NEGATIVE    UNABLE TO OBTAIN    PHYSICAL EXAM:  VITALS: T(C): 36.8 (07-04-24 @ 04:24)  T(F): 98.2 (07-04-24 @ 04:24), Max: 98.2 (07-04-24 @ 04:24)  HR: 86 (07-04-24 @ 10:18) (69 - 90)  BP: 100/62 (07-04-24 @ 10:18) (96/60 - 113/64)  RR:  (18 - 18)  SpO2:  (97% - 99%)  Wt(kg): --  GENERAL: NAD, well-groomed, well-developed  EYES: No proptosis, no lid lag, anicteric  HEENT:  Atraumatic, Normocephalic, moist mucous membranes  THYROID: Normal size, no palpable nodules  RESPIRATORY: Clear to auscultation bilaterally; No rales, rhonchi, wheezing  CARDIOVASCULAR: Regular rate and rhythm; No murmurs; no peripheral edema  GI: Soft, nontender, non distended, normal bowel sounds  SKIN: Dry, intact, No rashes or lesions  MUSCULOSKELETAL: Full range of motion, normal strength  NEURO: sensation intact, extraocular movements intact, no tremor  PSYCH: Alert and oriented x 3, normal affect, normal mood  CUSHING'S SIGNS: no striae      CAPILLARY BLOOD GLUCOSE                                10.2   9.66  )-----------( 313      ( 04 Jul 2024 10:37 )             31.5       07-04    139  |  108  |  24<H>  ----------------------------<  80  3.8   |  16<L>  |  1.08    eGFR: 70    Ca    9.2      07-04  Mg     1.8     07-03  Phos  3.0     07-03    TPro  7.0  /  Alb  3.1<L>  /  TBili  0.5  /  DBili  x   /  AST  117<H>  /  ALT  81<H>  /  AlkPhos  307<H>  07-02      Thyroid Function Tests:      A1C with Estimated Average Glucose Result: 6.5 % (11-17-23 @ 06:38)  A1C with Estimated Average Glucose Result: 6.7 % (10-25-23 @ 06:57)          Radiology:              NOTE INCOMPLETE/ IN PROGRESS  *Please wait for attending attestation for official recommendations.     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 Patient states since discharge on this past Saturday, he continued to feel ongoing generalized weakness and dizziness with positional changes despite compliance with home midodrine 15mg TID and holding torsemide as instructed. Admits to poor PO intake of fluids/solute due to ongoing issues with poor appetite and nausea with meals which is not new. Denies any fever, chills, chest pain, SOB, cough, abd pain, dysuria nor urinary frequency. Has chronic diarrhea that is unchanged from his baseline. Admitted to medicine for symptomatic hypotension and RAZIA. 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 [place] at [facility] [place]. S/p radiation therapy 10/2023. Denies ever having flushing. Has had diarrhea for a long time. Reports that significant weakness began after radiation therapy.    He reports having had orthostatic lightheadedness since his last hospitalization here (6/21-29) for which he was hospitalized for failing his cardiac stress test and had RAINES. His torsemide (which he has been taking for a long time for his BLE swelling) was stopped. Jardiance was also discontinued on discharge (he thinks indication was for cardiac). He has continued taking midodrine 15mg TID at home since discharge.    He reports he previously was told some time ago that he has DM but shortly afterwards he was told he doesn't need DM meds anymore.        PAST MEDICAL & SURGICAL HISTORY:  Hypertension      Hypercholesterolemia      PAD (peripheral artery disease)      Neuroendocrine carcinoma      Hepatic carcinoma      Atrial fibrillation and flutter      CAD (coronary artery disease)      S/P knee replacement, bilateral      S/P hip replacement  right hip      History of hernia repair      H/O laminectomy      History of lumbosacral spine surgery      History of cardioversion          FAMILY HISTORY:  No family hx of neuroendocrine tumors      Social History:  Remote smoking  No alcohol     Outpatient Medications:  Home Medications:  allopurinol 100 mg oral tablet: 1 tab(s) orally 2 times a day (02 Jul 2024 18:36)  ascorbic acid 500 mg oral tablet: 1 tab(s) orally once a day (02 Jul 2024 18:36)  clopidogrel 75 mg oral tablet: 1 tab(s) orally once a day (02 Jul 2024 18:36)  Eliquis 5 mg oral tablet: 1 tab(s) orally 2 times a day (02 Jul 2024 18:36)  folic acid 1 mg oral tablet: 1 tab(s) orally once a day (02 Jul 2024 18:36)  midodrine 10 mg oral tablet: 1 tab(s) orally 3 times a day (02 Jul 2024 18:36)  midodrine 5 mg oral tablet: 1 tab(s) orally 3 times a day (02 Jul 2024 18:36)  Multiple Vitamins oral tablet: 1 tab(s) orally once a day (02 Jul 2024 18:36)  rosuvastatin 40 mg oral tablet: 1 tab(s) orally once a day (02 Jul 2024 18:36)  Zetia 10 mg oral tablet: 1 tab(s) orally once a day (02 Jul 2024 18:36)    MEDICATIONS  (STANDING):  allopurinol 100 milliGRAM(s) Oral two times a day  apixaban 5 milliGRAM(s) Oral every 12 hours  ascorbic acid 500 milliGRAM(s) Oral daily  atorvastatin 80 milliGRAM(s) Oral at bedtime  clopidogrel Tablet 75 milliGRAM(s) Oral daily  colchicine 0.6 milliGRAM(s) Oral daily  folic acid 1 milliGRAM(s) Oral daily  lactated ringers. 1000 milliLiter(s) (75 mL/Hr) IV Continuous <Continuous>  midodrine. 15 milliGRAM(s) Oral three times a day  multivitamin 1 Tablet(s) Oral daily  sodium chloride 0.9%. 1000 milliLiter(s) (50 mL/Hr) IV Continuous <Continuous>  sotalol. 40 milliGRAM(s) Oral every 24 hours    MEDICATIONS  (PRN):  acetaminophen     Tablet .. 650 milliGRAM(s) Oral every 6 hours PRN Temp greater or equal to 38C (100.4F), Mild Pain (1 - 3)  ondansetron Injectable 4 milliGRAM(s) IV Push every 8 hours PRN Nausea and/or Vomiting      Allergies    No Known Allergies    Intolerances      Review of Systems:  Constitutional: No fever  Eyes: No blurry vision  Neuro: No tremors  HEENT: No pain  Cardiovascular: +orthostatic lightheadedness. No chest pain, palpitations  Respiratory: No SOB, no cough  GI: No nausea, vomiting. +chronic diarrhea  : No dysuria  Skin: no rash  Psych: no depression  Endocrine: no polyuria, polydipsia  Hem/lymph: no swelling  Osteoporosis: no fractures    PHYSICAL EXAM:  VITALS: T(C): 36.8 (07-04-24 @ 04:24)  T(F): 98.2 (07-04-24 @ 04:24), Max: 98.2 (07-04-24 @ 04:24)  HR: 86 (07-04-24 @ 10:18) (69 - 90)  BP: 100/62 (07-04-24 @ 10:18) (96/60 - 113/64)  RR:  (18 - 18)  SpO2:  (97% - 99%)  Wt(kg): --  GENERAL: NAD, resting comfortably  EYES: No proptosis, no lid lag, anicteric  HEENT:  Atraumatic, Normocephalic, moist mucous membranes  THYROID: Normal size, no palpable nodules  RESPIRATORY: Clear to auscultation bilaterally; No rales, rhonchi, wheezing  CARDIOVASCULAR: Regular rate and rhythm; No murmurs. mild peripheral edema  GI: Soft, nontender, non distended  SKIN: Dry, intact, No rashes or lesions  MUSCULOSKELETAL: LACY  NEURO: extraocular movements intact, no tremor  PSYCH: Alert and oriented x 3, normal affect, normal mood  CUSHING'S SIGNS: no striae      CAPILLARY BLOOD GLUCOSE                                10.2   9.66  )-----------( 313      ( 04 Jul 2024 10:37 )             31.5       07-04    139  |  108  |  24<H>  ----------------------------<  80  3.8   |  16<L>  |  1.08    eGFR: 70    Ca    9.2      07-04  Mg     1.8     07-03  Phos  3.0     07-03    TPro  7.0  /  Alb  3.1<L>  /  TBili  0.5  /  DBili  x   /  AST  117<H>  /  ALT  81<H>  /  AlkPhos  307<H>  07-02      Thyroid Function Tests:      A1C with Estimated Average Glucose Result: 6.5 % (11-17-23 @ 06:38)  A1C with Estimated Average Glucose Result: 6.7 % (10-25-23 @ 06:57)          Radiology:                HPI:  79 yo male with PMH of CAD s/p PCI x3 with most recent stent 6/12/24 on Plavix, chronic Afib on eliquis, orthostatic hypotension on midodrine, PAD, metastatic neuroendocrine 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 Patient states since discharge on this past Saturday, he continued to feel ongoing generalized weakness and dizziness with positional changes despite compliance with home midodrine 15mg TID and holding torsemide as instructed. Admits to poor PO intake of fluids/solute due to ongoing issues with poor appetite and nausea with meals which is not new. Denies any fever, chills, chest pain, SOB, cough, abd pain, dysuria nor urinary frequency. Has chronic diarrhea that is unchanged from his baseline. Admitted to medicine for symptomatic hypotension and RAZIA. 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 His care is managed at [facility] [place]. S/p radiation therapy 10/2023. Denies ever having flushing. Has had diarrhea for a long time. Reports that significant weakness began after radiation therapy.    He reports having had orthostatic lightheadedness since his last hospitalization here (6/21-29) for which he was hospitalized for failing his cardiac stress test and had RAINES. His torsemide (which he has been taking for a long time for his BLE swelling) was stopped. Jardiance was also discontinued on discharge (he thinks indication was for cardiac). He has continued taking midodrine 15mg TID at home since discharge.    He reports he previously was told some time ago that he has DM but shortly afterwards he was told he doesn't need DM meds anymore.        PAST MEDICAL & SURGICAL HISTORY:  Hypertension      Hypercholesterolemia      PAD (peripheral artery disease)      Neuroendocrine carcinoma      Hepatic carcinoma      Atrial fibrillation and flutter      CAD (coronary artery disease)      S/P knee replacement, bilateral      S/P hip replacement  right hip      History of hernia repair      H/O laminectomy      History of lumbosacral spine surgery      History of cardioversion          FAMILY HISTORY:  No family hx of neuroendocrine tumors      Social History:  Remote smoking  No alcohol     Outpatient Medications:  Home Medications:  allopurinol 100 mg oral tablet: 1 tab(s) orally 2 times a day (02 Jul 2024 18:36)  ascorbic acid 500 mg oral tablet: 1 tab(s) orally once a day (02 Jul 2024 18:36)  clopidogrel 75 mg oral tablet: 1 tab(s) orally once a day (02 Jul 2024 18:36)  Eliquis 5 mg oral tablet: 1 tab(s) orally 2 times a day (02 Jul 2024 18:36)  folic acid 1 mg oral tablet: 1 tab(s) orally once a day (02 Jul 2024 18:36)  midodrine 10 mg oral tablet: 1 tab(s) orally 3 times a day (02 Jul 2024 18:36)  midodrine 5 mg oral tablet: 1 tab(s) orally 3 times a day (02 Jul 2024 18:36)  Multiple Vitamins oral tablet: 1 tab(s) orally once a day (02 Jul 2024 18:36)  rosuvastatin 40 mg oral tablet: 1 tab(s) orally once a day (02 Jul 2024 18:36)  Zetia 10 mg oral tablet: 1 tab(s) orally once a day (02 Jul 2024 18:36)    MEDICATIONS  (STANDING):  allopurinol 100 milliGRAM(s) Oral two times a day  apixaban 5 milliGRAM(s) Oral every 12 hours  ascorbic acid 500 milliGRAM(s) Oral daily  atorvastatin 80 milliGRAM(s) Oral at bedtime  clopidogrel Tablet 75 milliGRAM(s) Oral daily  colchicine 0.6 milliGRAM(s) Oral daily  folic acid 1 milliGRAM(s) Oral daily  lactated ringers. 1000 milliLiter(s) (75 mL/Hr) IV Continuous <Continuous>  midodrine. 15 milliGRAM(s) Oral three times a day  multivitamin 1 Tablet(s) Oral daily  sodium chloride 0.9%. 1000 milliLiter(s) (50 mL/Hr) IV Continuous <Continuous>  sotalol. 40 milliGRAM(s) Oral every 24 hours    MEDICATIONS  (PRN):  acetaminophen     Tablet .. 650 milliGRAM(s) Oral every 6 hours PRN Temp greater or equal to 38C (100.4F), Mild Pain (1 - 3)  ondansetron Injectable 4 milliGRAM(s) IV Push every 8 hours PRN Nausea and/or Vomiting      Allergies    No Known Allergies    Intolerances      Review of Systems:  Constitutional: No fever  Eyes: No blurry vision  Neuro: No tremors  HEENT: No pain  Cardiovascular: +orthostatic lightheadedness. No chest pain, palpitations  Respiratory: No SOB, no cough  GI: No nausea, vomiting. +chronic diarrhea  : No dysuria  Skin: no rash  Psych: no depression  Endocrine: no polyuria, polydipsia  Hem/lymph: no swelling  Osteoporosis: no fractures    PHYSICAL EXAM:  VITALS: T(C): 36.8 (07-04-24 @ 04:24)  T(F): 98.2 (07-04-24 @ 04:24), Max: 98.2 (07-04-24 @ 04:24)  HR: 86 (07-04-24 @ 10:18) (69 - 90)  BP: 100/62 (07-04-24 @ 10:18) (96/60 - 113/64)  RR:  (18 - 18)  SpO2:  (97% - 99%)  Wt(kg): --  GENERAL: NAD, resting comfortably  EYES: No proptosis, no lid lag, anicteric  HEENT:  Atraumatic, Normocephalic, moist mucous membranes  THYROID: Normal size, no palpable nodules  RESPIRATORY: Clear to auscultation bilaterally; No rales, rhonchi, wheezing  CARDIOVASCULAR: Regular rate and rhythm; No murmurs. mild peripheral edema  GI: Soft, nontender, non distended  SKIN: Dry, intact, No rashes or lesions  MUSCULOSKELETAL: LACY  NEURO: extraocular movements intact, no tremor  PSYCH: Alert and oriented x 3, normal affect, normal mood  CUSHING'S SIGNS: no striae      CAPILLARY BLOOD GLUCOSE                                10.2   9.66  )-----------( 313      ( 04 Jul 2024 10:37 )             31.5       07-04    139  |  108  |  24<H>  ----------------------------<  80  3.8   |  16<L>  |  1.08    eGFR: 70    Ca    9.2      07-04  Mg     1.8     07-03  Phos  3.0     07-03    TPro  7.0  /  Alb  3.1<L>  /  TBili  0.5  /  DBili  x   /  AST  117<H>  /  ALT  81<H>  /  AlkPhos  307<H>  07-02      Thyroid Function Tests:      A1C with Estimated Average Glucose Result: 6.5 % (11-17-23 @ 06:38)  A1C with Estimated Average Glucose Result: 6.7 % (10-25-23 @ 06:57)          Radiology:                HPI:  77 yo male with PMH of CAD s/p PCI x3 with most recent stent 6/12/24 on Plavix, chronic Afib on eliquis, orthostatic hypotension on midodrine, PAD, metastatic neuroendocrine 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 Patient states since discharge on this past Saturday, he continued to feel ongoing generalized weakness and dizziness with positional changes despite compliance with home midodrine 15mg TID and holding torsemide as instructed. Admits to poor PO intake of fluids/solute due to ongoing issues with poor appetite and nausea with meals which is not new. Denies any fever, chills, chest pain, SOB, cough, abd pain, dysuria nor urinary frequency. Has chronic diarrhea that is unchanged from his baseline. Admitted to medicine for symptomatic hypotension and RAZIA. 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 [place] is [place] at [facility] [place]. S/p radiation therapy 10/2023. Denies ever having flushing. Has had diarrhea for a long time. Reports that significant weakness began after radiation therapy.    He reports having had orthostatic lightheadedness since his last hospitalization here (6/21-29) for which he was hospitalized for failing his cardiac stress test and had RAINES. His torsemide (which he has been taking for a long time for his BLE swelling) was stopped. Jardiance was also discontinued on discharge (he thinks indication was for cardiac). He has continued taking midodrine 15mg TID at home since discharge.    He reports he previously was told some time ago that he has DM but shortly afterwards he was told he doesn't need DM meds anymore.        PAST MEDICAL & SURGICAL HISTORY:  Hypertension      Hypercholesterolemia      PAD (peripheral artery disease)      Neuroendocrine carcinoma      Hepatic carcinoma      Atrial fibrillation and flutter      CAD (coronary artery disease)      S/P knee replacement, bilateral      S/P hip replacement  right hip      History of hernia repair      H/O laminectomy      History of lumbosacral spine surgery      History of cardioversion          FAMILY HISTORY:  No family hx of neuroendocrine tumors      Social History:  Remote smoking  No alcohol     Outpatient Medications:  Home Medications:  allopurinol 100 mg oral tablet: 1 tab(s) orally 2 times a day (02 Jul 2024 18:36)  ascorbic acid 500 mg oral tablet: 1 tab(s) orally once a day (02 Jul 2024 18:36)  clopidogrel 75 mg oral tablet: 1 tab(s) orally once a day (02 Jul 2024 18:36)  Eliquis 5 mg oral tablet: 1 tab(s) orally 2 times a day (02 Jul 2024 18:36)  folic acid 1 mg oral tablet: 1 tab(s) orally once a day (02 Jul 2024 18:36)  midodrine 10 mg oral tablet: 1 tab(s) orally 3 times a day (02 Jul 2024 18:36)  midodrine 5 mg oral tablet: 1 tab(s) orally 3 times a day (02 Jul 2024 18:36)  Multiple Vitamins oral tablet: 1 tab(s) orally once a day (02 Jul 2024 18:36)  rosuvastatin 40 mg oral tablet: 1 tab(s) orally once a day (02 Jul 2024 18:36)  Zetia 10 mg oral tablet: 1 tab(s) orally once a day (02 Jul 2024 18:36)    MEDICATIONS  (STANDING):  allopurinol 100 milliGRAM(s) Oral two times a day  apixaban 5 milliGRAM(s) Oral every 12 hours  ascorbic acid 500 milliGRAM(s) Oral daily  atorvastatin 80 milliGRAM(s) Oral at bedtime  clopidogrel Tablet 75 milliGRAM(s) Oral daily  colchicine 0.6 milliGRAM(s) Oral daily  folic acid 1 milliGRAM(s) Oral daily  lactated ringers. 1000 milliLiter(s) (75 mL/Hr) IV Continuous <Continuous>  midodrine. 15 milliGRAM(s) Oral three times a day  multivitamin 1 Tablet(s) Oral daily  sodium chloride 0.9%. 1000 milliLiter(s) (50 mL/Hr) IV Continuous <Continuous>  sotalol. 40 milliGRAM(s) Oral every 24 hours    MEDICATIONS  (PRN):  acetaminophen     Tablet .. 650 milliGRAM(s) Oral every 6 hours PRN Temp greater or equal to 38C (100.4F), Mild Pain (1 - 3)  ondansetron Injectable 4 milliGRAM(s) IV Push every 8 hours PRN Nausea and/or Vomiting      Allergies    No Known Allergies    Intolerances      Review of Systems:  Constitutional: No fever  Eyes: No blurry vision  Neuro: No tremors  HEENT: No pain  Cardiovascular: +orthostatic lightheadedness. No chest pain, palpitations  Respiratory: No SOB, no cough  GI: No nausea, vomiting. +chronic diarrhea  : No dysuria  Skin: no rash  Psych: no depression  Endocrine: no polyuria, polydipsia  Hem/lymph: no swelling  Osteoporosis: no fractures    PHYSICAL EXAM:  VITALS: T(C): 36.8 (07-04-24 @ 04:24)  T(F): 98.2 (07-04-24 @ 04:24), Max: 98.2 (07-04-24 @ 04:24)  HR: 86 (07-04-24 @ 10:18) (69 - 90)  BP: 100/62 (07-04-24 @ 10:18) (96/60 - 113/64)  RR:  (18 - 18)  SpO2:  (97% - 99%)  GENERAL: NAD, resting comfortably  EYES: No proptosis, no lid lag, anicteric  HEENT:  Atraumatic, Normocephalic, moist mucous membranes  THYROID: Normal size, no palpable nodules  RESPIRATORY: Clear to auscultation bilaterally; No rales, rhonchi, wheezing  CARDIOVASCULAR: Regular rate and rhythm; No murmurs. mild peripheral edema  GI: Soft, nontender, non distended  SKIN: Dry, intact, No rashes or lesions  MUSCULOSKELETAL: LACY  NEURO: extraocular movements intact, no tremor  PSYCH: Alert and oriented x 3, normal affect, normal mood  CUSHING'S SIGNS: no striae      CAPILLARY BLOOD GLUCOSE                                10.2   9.66  )-----------( 313      ( 04 Jul 2024 10:37 )             31.5       07-04    139  |  108  |  24<H>  ----------------------------<  80  3.8   |  16<L>  |  1.08    eGFR: 70    Ca    9.2      07-04  Mg     1.8     07-03  Phos  3.0     07-03    TPro  7.0  /  Alb  3.1<L>  /  TBili  0.5  /  DBili  x   /  AST  117<H>  /  ALT  81<H>  /  AlkPhos  307<H>  07-02      Thyroid Function Tests:      A1C with Estimated Average Glucose Result: 6.5 % (11-17-23 @ 06:38)  A1C with Estimated Average Glucose Result: 6.7 % (10-25-23 @ 06:57)          Radiology:

## 2024-07-04 NOTE — PROGRESS NOTE ADULT - ASSESSMENT
77 yo male with PMH of CAD s/p PCI x3 with most recent stent 6/12/24 on Plavix, chronic Afib on eliquis, orthostatic hypotension on midodrine, PAD, 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 Patient states since discharge on this past Saturday, he continued to feel ongoing generalized weakness and dizziness with positional changes despite compliance with home midodrine 15mg TID and holding torsemide as instructed as of day PTA . Admits to poor PO intake of fluids/solute due to ongoing issues with poor appetite and nausea with meals which is not new. In the ED, vitals notable for SBP 92-11s. Labs notable for elevated BUN/Cr 31/1.59 (from 30/1.18 on day of discharge 6/29/24). CXR with clear lungs. Abd US with innumerable intrahepatic echogenic masses consistent with known metastatic neuroendocrine tumor. Admitted to medicine for symptomatic hypotension and RAZIA.      1- RAZIA   2- hypotension   3- hx chf   4- pericarditis     cr abn in setting of hypotension ATN however overall cr is improving   off  SGLT 2 inh as well as diuretics for now   acidosis given pt on jardiance recently to have beta hydroxy level   can sotalol be changed to aother agent ?   monitor for diarrhea given also on colchicine   midodrine -20 mg tid   strict I/O  orthostatics daily

## 2024-07-05 NOTE — PROGRESS NOTE ADULT - ASSESSMENT
79 yo male with PMH of CAD s/p PCI x3 with most recent stent 6/12/24 on Plavix, chronic Afib on eliquis, orthostatic hypotension on midodrine, PAD, 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 Patient states since discharge on this past Saturday, he continued to feel ongoing generalized weakness and dizziness with positional changes despite compliance with home midodrine 15mg TID and holding torsemide as instructed as of day PTA . Admits to poor PO intake of fluids/solute due to ongoing issues with poor appetite and nausea with meals which is not new. In the ED, vitals notable for SBP 92-11s. Labs notable for elevated BUN/Cr 31/1.59 (from 30/1.18 on day of discharge 6/29/24). CXR with clear lungs. Abd US with innumerable intrahepatic echogenic masses consistent with known metastatic neuroendocrine tumor. Admitted to medicine for symptomatic hypotension and RAZIA.      1- RAZIA   2- hypotension   3- hx chf   4- pericarditis     cr abn in setting of hypotension ATN however overall cr is improving   off  SGLT 2 inh as well as diuretics for now   acidosis given pt on jardiance recently to have beta hydroxy level and lactic acid   can sotalol be changed to aother agent ?   monitor for diarrhea given also on colchicine   midodrine -20 mg tid   will add northera if bp still low   avoiding florinef at present given recent therapy for fluid overload   endo work up in progress  strict I/O  orthostatics daily

## 2024-07-05 NOTE — PROGRESS NOTE ADULT - SUBJECTIVE AND OBJECTIVE BOX
Patient is a 78y old  Male who presents with a chief complaint of hypotension (05 Jul 2024 10:30)      SUBJECTIVE / OVERNIGHT EVENTS:  Pt seen and examined. No acute events overnight. Noted to be orthostatic x 2 this AM ; pt reports some lightheadedness but was able to ambulate with his walker to the restroom.    MEDICATIONS  (STANDING):  allopurinol 100 milliGRAM(s) Oral two times a day  apixaban 5 milliGRAM(s) Oral every 12 hours  ascorbic acid 500 milliGRAM(s) Oral daily  atorvastatin 80 milliGRAM(s) Oral at bedtime  clopidogrel Tablet 75 milliGRAM(s) Oral daily  colchicine 0.6 milliGRAM(s) Oral daily  folic acid 1 milliGRAM(s) Oral daily  lactated ringers. 1000 milliLiter(s) (75 mL/Hr) IV Continuous <Continuous>  midodrine. 20 milliGRAM(s) Oral three times a day  multivitamin 1 Tablet(s) Oral daily  sodium chloride 0.9%. 1000 milliLiter(s) (50 mL/Hr) IV Continuous <Continuous>  sotalol. 40 milliGRAM(s) Oral every 24 hours    MEDICATIONS  (PRN):  acetaminophen     Tablet .. 650 milliGRAM(s) Oral every 6 hours PRN Temp greater or equal to 38C (100.4F), Mild Pain (1 - 3)  ondansetron Injectable 4 milliGRAM(s) IV Push every 8 hours PRN Nausea and/or Vomiting      Vital Signs Last 24 Hrs  T(C): 36.4 (05 Jul 2024 08:17), Max: 36.7 (04 Jul 2024 16:26)  T(F): 97.5 (05 Jul 2024 08:17), Max: 98 (04 Jul 2024 16:26)  HR: 78 (05 Jul 2024 06:44) (72 - 90)  BP: 107/68 (05 Jul 2024 06:44) (101/61 - 116/69)  BP(mean): --  RR: 18 (05 Jul 2024 08:17) (18 - 18)  SpO2: 95% (05 Jul 2024 08:17) (95% - 98%)    Parameters below as of 05 Jul 2024 08:17  Patient On (Oxygen Delivery Method): room air      CAPILLARY BLOOD GLUCOSE        I&O's Summary    04 Jul 2024 07:01  -  05 Jul 2024 07:00  --------------------------------------------------------  IN: 1385 mL / OUT: 900 mL / NET: 485 mL    05 Jul 2024 07:01  -  05 Jul 2024 11:48  --------------------------------------------------------  IN: 200 mL / OUT: 0 mL / NET: 200 mL        PHYSICAL EXAM:  GENERAL: NAD, well-groomed  HEAD:  Atraumatic, Normocephalic  EYES: EOMI, PERRLA, conjunctiva and sclera clear  NECK: Supple, No JVD  CHEST/LUNG: Clear to auscultation bilaterally; No wheeze  HEART: Regular rate and rhythm; No murmurs, rubs, or gallops  ABDOMEN: Soft, Nontender, Nondistended; Bowel sounds present  EXTREMITIES:  2+ Peripheral Pulses, No clubbing, cyanosis, or edema  PSYCH: AAOx3  NEUROLOGY: non-focal  SKIN: No rashes or lesions    LABS:                        11.1   9.13  )-----------( 330      ( 05 Jul 2024 07:17 )             34.1     07-05    140  |  106  |  22  ----------------------------<  106<H>  3.8   |  17<L>  |  0.99    Ca    9.4      05 Jul 2024 07:17            Urinalysis Basic - ( 05 Jul 2024 07:17 )    Color: x / Appearance: x / SG: x / pH: x  Gluc: 106 mg/dL / Ketone: x  / Bili: x / Urobili: x   Blood: x / Protein: x / Nitrite: x   Leuk Esterase: x / RBC: x / WBC x   Sq Epi: x / Non Sq Epi: x / Bacteria: x          Consultant(s) Notes Reviewed:  Nephrology, Cardiology

## 2024-07-05 NOTE — DIETITIAN INITIAL EVALUATION ADULT - ADD RECOMMEND
1) Recommend continue current diet as tolerated, Regular, in setting of poor PO intake. Defer diet texture/consistency to Medical Team.   2) Recommend discontinuing ProSource Gelatein 2x/day and Magic Cup 2x/day (per pt preference). Pt amenable to trialing Smoothie of the Day 2x/day in-house to optimize protein-energy intake. Declined Ensure and other supplements at this time.   3) Recommend continue Multivitamin, Vitamin C, and Folic Acid daily for micronutrient coverage unless contraindicated.   4) Monitor and encourage adequate PO intake, RD obtained food preferences to optimize PO intake, will honor as able. Pt with menu at bedside.   5) Monitor electrolytes, replete as needed. Monitor glucose. Recommend obtaining updated A1C to monitor glucose trends.  6) Monitor nutritional intake, tolerance to diet prescription, bowel movements, weights, labs, and skin integrity.   7) Malnutrition alert placed in chart.  1) Recommend continue current diet as tolerated, Regular, in setting of poor PO intake. Defer diet texture/consistency to Medical Team.   2) Recommend discontinuing ProSource Gelatein 2x/day and Magic Cup 2x/day (per pt preference). Pt amenable to trialing Smoothie of the Day 2x/day in-house to optimize protein-energy intake. Declined Ensure and other supplements at this time.   3) Recommend continue Multivitamin, Vitamin C, and Folic Acid daily for micronutrient coverage unless contraindicated. Add Thiamine daily unless contraindicated in setting of poor PO intake.   4) Monitor and encourage adequate PO intake, RD obtained food preferences to optimize PO intake, will honor as able. Pt with menu at bedside.   5) Monitor electrolytes (potassium, magnesium, phosphorous), replete as needed. Monitor glucose. Consider obtaining updated A1C to monitor glucose trends as medically feasible.  6) Monitor nutritional intake, tolerance to diet prescription, bowel movements, weights, labs, and skin integrity.   7) Malnutrition alert placed in chart.  1) Recommend continue current diet as tolerated, Regular, in setting of poor PO intake. Defer diet texture/consistency to Medical Team.   -> If poor PO intake persists, consider alternative means of nutrition/hydration if within pt's/family's wishes/GOC.   2) Recommend discontinuing ProSource Gelatein 2x/day and Magic Cup 2x/day (per pt preference). Pt amenable to trialing Smoothie of the Day 2x/day in-house to optimize protein-energy intake. Declined Ensure and other supplements at this time.   3) Consider adding appetite stimulant unless contraindicated to assist with optimizing PO intake.   4) Recommend continue Multivitamin, Vitamin C, and Folic Acid daily for micronutrient coverage unless contraindicated. Add Thiamine daily unless contraindicated in setting of poor PO intake.   5) Monitor and encourage adequate PO intake, RD obtained food preferences to optimize PO intake, will honor as able. Pt with menu at bedside.   6) Monitor electrolytes (potassium, magnesium, phosphorous), replete as needed. Monitor glucose. Consider obtaining updated A1C to monitor glucose trends as medically feasible.  7) Monitor nutritional intake, tolerance to diet prescription, bowel movements, weights, labs, and skin integrity.   8) Malnutrition alert placed in chart.  1) Recommend continue current diet as tolerated, Regular, in setting of poor PO intake. Defer diet texture/consistency to Medical Team.   -> If poor PO intake persists, consider alternative means of nutrition/hydration if within pt's/family's wishes/GOC.   2) Recommend discontinuing ProSource Gelatein 2x/day and Magic Cup 2x/day (per pt preference). Pt amenable to trialing Smoothie of the Day 2x/day in-house to optimize protein-energy intake. Declined other supplements at this time.   3) Consider adding appetite stimulant unless contraindicated to assist with optimizing PO intake.   4) Recommend continue Multivitamin, Vitamin C, and Folic Acid daily for micronutrient coverage unless contraindicated. Add Thiamine daily unless contraindicated in setting of poor PO intake.   5) Monitor electrolytes (potassium, magnesium, phosphorous), replete as needed. Monitor glucose. Consider obtaining updated A1C to monitor glucose trends as medically feasible.  6) Monitor nutritional intake, tolerance to diet prescription, bowel movements, weights, labs, and skin integrity.   7) Malnutrition alert placed in chart.

## 2024-07-05 NOTE — DIETITIAN INITIAL EVALUATION ADULT - ENERGY INTAKE
Poor (<50%) Currently in-house, pt endorsed poor intake/appetite. Pt stated he is eating ~15% of meals in-house. RD observed breakfast tray untouched at bedside. Pt with menu at bedside, endorsed ordering down for meals. Pt endorsed institutional food dislike. RD obtained food preferences to optimize PO intake, will honor as able.  Pt noted to be ordered for ProSource Gelatein 2x/day and Magic Cup 2x/day, stated he is not consuming it, dislikes it. RD to discontinue per pt preference. Pt amenable to trialing Smoothie of the Day 2x/day in-house to optimize protein-energy intake. Amenable to current flavors in-house provided.

## 2024-07-05 NOTE — PROGRESS NOTE ADULT - SUBJECTIVE AND OBJECTIVE BOX
DATE OF SERVICE: 07-05-24 @ 10:30    Patient is a 78y old  Male who presents with a chief complaint of hypotension (04 Jul 2024 20:32)      INTERVAL HISTORY: Feels ok.     REVIEW OF SYSTEMS:   CONSTITUTIONAL: No weakness  EYES/ENT: No visual changes;  No throat pain   NECK: No pain or stiffness  RESPIRATORY: No cough, wheezing; No shortness of breath  CARDIOVASCULAR: No chest pain or palpitations  GASTROINTESTINAL: No abdominal  pain. No nausea, vomiting, or hematemesis  GENITOURINARY: No dysuria, frequency or hematuria  NEUROLOGICAL: No stroke like symptoms  SKIN: No rashes    	  MEDICATIONS:  midodrine. 15 milliGRAM(s) Oral three times a day  sotalol. 40 milliGRAM(s) Oral every 24 hours        PHYSICAL EXAM:  T(C): 36.4 (07-05-24 @ 08:17), Max: 36.7 (07-04-24 @ 16:26)  HR: 78 (07-05-24 @ 06:44) (72 - 90)  BP: 107/68 (07-05-24 @ 06:44) (101/61 - 116/69)  RR: 18 (07-05-24 @ 08:17) (18 - 18)  SpO2: 95% (07-05-24 @ 08:17) (95% - 98%)  Wt(kg): --  I&O's Summary    04 Jul 2024 07:01  -  05 Jul 2024 07:00  --------------------------------------------------------  IN: 1385 mL / OUT: 900 mL / NET: 485 mL          Appearance: In no distress	  HEENT:    PERRL, EOMI	  Cardiovascular:  S1 S2, No JVD  Respiratory: Lungs clear to auscultation	  Gastrointestinal:  Soft, Non-tender, + BS	  Vascularature:  No edema of LE  Psychiatric: Appropriate affect   Neuro: no acute focal deficits                               11.1   9.13  )-----------( 330      ( 05 Jul 2024 07:17 )             34.1     07-05    140  |  106  |  22  ----------------------------<  106<H>  3.8   |  17<L>  |  0.99    Ca    9.4      05 Jul 2024 07:17          Labs personally reviewed      ASSESSMENT/PLAN: 	    79 yo male with PMH of CAD s/p PCI x3 with most recent stent 6/12/24 on Plavix, chronic Afib on eliquis, orthostatic hypotension on midodrine, PAD, 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 Patient states since discharge on this past Saturday, he continued to feel ongoing generalized weakness and dizziness with positional changes despite compliance with home midodrine 15mg TID and holding torsemide as instructed. Admits to poor PO intake of fluids/solute due to ongoing issues with poor appetite and nausea with meals which is not new. Denies any fever, chills, chest pain, SOB, cough, abd pain, dysuria nor urinary frequency. Has chronic diarrhea that is unchanged from his baseline.     78-year-old male multiple medical problems history of hepatocellular carcinoma status post radiation therapy, AF s/p DCCV on Eliquis who was found to be hypotensive following NST. Patient has reported general weakness, fatigue, lightheadedness since being discharged from hospital. Reports mild chest discomfort in midsternal region. Reports dyspnea with minimal exertion. Also reports significant lack of appetite and poor PO intake. No dark or bloody stool, nausea or vomiting.       Problem/Plan - 1:  ·  Problem: Hypotension  ·  Plan: Continue to hold home torsemide and Jardiance.   - Patient presents with hypotension and also RAZIA. Has known orthostatic hypotension.   - Patient and wife report decreased PO intake likely 2/2 malignancy.  - Appreciate PT recs  - Orthos still positive: s/p fluids with improvement in degree of orthostasis  - Increase Midodrine to 20mg PO TID as per Renal  - Appreciate Endocrine recommendations to r/o adrenal insufficiency     Problem/Plan - 2:  ·  Problem: CAD.   ·  Plan: Recent PCI to pLAD in June 2023  - ECG non-ischemic  - c/w Plavix and statin     Problem/Plan - 3:  ·  Problem: Atrial Fibrillation  - s/p succesful DCCV 10/31  - c/w Eliquis 5mg BID   - C/w of Sotalol 40mg PO daily (qTC wnl)    Problem/Plan - 4:  ·  Problem: Pericarditis.   ·  Plan: Chest pain now improved since last admission.  - c/w colchicine 0.6mg PO daily      OP follow up with Dr. Yoseph Espinosa, AG-NP   Hank Hayes DO [facility]  Cardiovascular Medicine  [address] [place] [address], [address]  Available through call or text on [employer]  Office: [phone]   DATE OF SERVICE: 07-05-24 @ 10:30    Patient is a 78y old  Male who presents with a chief complaint of hypotension (04 Jul 2024 20:32)      INTERVAL HISTORY: Feels ok.     REVIEW OF SYSTEMS:   CONSTITUTIONAL: No weakness  EYES/ENT: No visual changes;  No throat pain   NECK: No pain or stiffness  RESPIRATORY: No cough, wheezing; No shortness of breath  CARDIOVASCULAR: No chest pain or palpitations  GASTROINTESTINAL: No abdominal  pain. No nausea, vomiting, or hematemesis  GENITOURINARY: No dysuria, frequency or hematuria  NEUROLOGICAL: No stroke like symptoms  SKIN: No rashes    	  MEDICATIONS:  midodrine. 15 milliGRAM(s) Oral three times a day  sotalol. 40 milliGRAM(s) Oral every 24 hours        PHYSICAL EXAM:  T(C): 36.4 (07-05-24 @ 08:17), Max: 36.7 (07-04-24 @ 16:26)  HR: 78 (07-05-24 @ 06:44) (72 - 90)  BP: 107/68 (07-05-24 @ 06:44) (101/61 - 116/69)  RR: 18 (07-05-24 @ 08:17) (18 - 18)  SpO2: 95% (07-05-24 @ 08:17) (95% - 98%)  Wt(kg): --  I&O's Summary    04 Jul 2024 07:01  -  05 Jul 2024 07:00  --------------------------------------------------------  IN: 1385 mL / OUT: 900 mL / NET: 485 mL          Appearance: In no distress	  HEENT:    PERRL, EOMI	  Cardiovascular:  S1 S2, No JVD  Respiratory: Lungs clear to auscultation	  Gastrointestinal:  Soft, Non-tender, + BS	  Vascularature:  No edema of LE  Psychiatric: Appropriate affect   Neuro: no acute focal deficits                               11.1   9.13  )-----------( 330      ( 05 Jul 2024 07:17 )             34.1     07-05    140  |  106  |  22  ----------------------------<  106<H>  3.8   |  17<L>  |  0.99    Ca    9.4      05 Jul 2024 07:17          Labs personally reviewed      ASSESSMENT/PLAN: 	    77 yo male with PMH of CAD s/p PCI x3 with most recent stent 6/12/24 on Plavix, chronic Afib on eliquis, orthostatic hypotension on midodrine, PAD, 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 Patient states since discharge on this past Saturday, he continued to feel ongoing generalized weakness and dizziness with positional changes despite compliance with home midodrine 15mg TID and holding torsemide as instructed. Admits to poor PO intake of fluids/solute due to ongoing issues with poor appetite and nausea with meals which is not new. Denies any fever, chills, chest pain, SOB, cough, abd pain, dysuria nor urinary frequency. Has chronic diarrhea that is unchanged from his baseline.     78-year-old male multiple medical problems history of hepatocellular carcinoma status post radiation therapy, AF s/p DCCV on Eliquis who was found to be hypotensive following NST. Patient has reported general weakness, fatigue, lightheadedness since being discharged from hospital. Reports mild chest discomfort in midsternal region. Reports dyspnea with minimal exertion. Also reports significant lack of appetite and poor PO intake. No dark or bloody stool, nausea or vomiting.       Problem/Plan - 1:  ·  Problem: Hypotension  ·  Plan: Continue to hold home torsemide and Jardiance.   - Patient presents with hypotension and also RAZIA. Has known orthostatic hypotension.   - Patient and wife report decreased PO intake likely 2/2 malignancy.  - Appreciate PT recs  - Orthos still positive: s/p fluids with improvement in degree of orthostasis  - Increase Midodrine to 20mg PO TID   - Appreciate Endocrine recommendations to r/o adrenal insufficiency     Problem/Plan - 2:  ·  Problem: CAD.   ·  Plan: Recent PCI to pLAD in June 2023  - ECG non-ischemic  - c/w Plavix and statin     Problem/Plan - 3:  ·  Problem: Atrial Fibrillation  - s/p succesful DCCV 10/31  - c/w Eliquis 5mg BID   - C/w of Sotalol 40mg PO daily (qTC wnl)    Problem/Plan - 4:  ·  Problem: Pericarditis.   ·  Plan: Chest pain now improved since last admission.  - c/w colchicine 0.6mg PO daily      Plan for discharge when asymptomatic from hypotension with OP follow up with Dr. Yoseph Espinosa, AG-NP   Hank Hayes DO [facility]  Cardiovascular Medicine  [address] [place] [address], [address]  Available through call or text on [employer]  Office: [phone]

## 2024-07-05 NOTE — DIETITIAN INITIAL EVALUATION ADULT - PERSON TAUGHT/METHOD
Educated on the importance of meeting adequate protein-energy needs. Encouraged consumption of HBV foods and prioritizing protein foods first at meal times. Encouraged consumption of oral nutrition supplement to optimize protein-energy intake. Encouraged small, frequent meals. Emphasized importance of consuming nutrient dense foods and snacks to optimize energy and protein intake. Obtained food preferences, will honor as able.       Provided education on nausea and vomiting nutrition therapy. Educated on the importance of avoiding foods with strong odors, choosing foods that are colder at room temperatures, avoiding fatty/greasy foods, and eating smaller and more frequent meals throughout the day to optimize PO intake. Pt aware RD remains available./verbal instruction/patient instructed

## 2024-07-05 NOTE — DISCHARGE NOTE PROVIDER - NSDCCPCAREPLAN_GEN_ALL_CORE_FT
PRINCIPAL DISCHARGE DIAGNOSIS  Diagnosis: Symptomatic hypotension  Assessment and Plan of Treatment: Continue taking Midodrine as directed. Monitor blood pressure. Follow up with PCP/Cardiologist.      SECONDARY DISCHARGE DIAGNOSES  Diagnosis: RAZIA (acute kidney injury)  Assessment and Plan of Treatment: Improved after IV fluids.   Avoid taking (NSAIDs) - (ex: Ibuprofen, Advil, Celebrex, Naprosyn)  Avoid taking any nephrotoxic agents (can harm kidneys) - Intravenous contrast for diagnostic testing, combination cold medications.  Have all medications adjusted for your renal function by your Health Care Provider.  Blood pressure control is important.  Take all medication as prescribed.      Diagnosis: Neuroendocrine carcinoma  Assessment and Plan of Treatment: Follow up with with Dr. Sophai Prasad at [facility] [place]     PRINCIPAL DISCHARGE DIAGNOSIS  Diagnosis: Hemorrhagic shock  Assessment and Plan of Treatment: You were found to have severe blood loss due to bleeding ulcers and an artery that supplied your stomach. You required an embolization of one of our arteries to stop further bleeding. YOu were also evaluated by our gastroenterology teams multiple times with an endoscopy to stop the bleeding ulcers, unfortunately, your bleeding persisted. We were concerned so we discussed with our surgery colleagues who recommended ***  Pleaes follow up with your PCP in 1-2 weeks for further evlauation of your general health        SECONDARY DISCHARGE DIAGNOSES  Diagnosis: CAD (coronary artery disease)  Assessment and Plan of Treatment: You have known coronary artery disease with recent stents placed. Unfortunately, you had severe GI Bleeds with the blood thinning medications so they had to be held. We discussed with our cardiology colleagues because we were concerned.   Please follow up with your Cardiologist in 1-2 weeks for followup of your cardiac heatlh    Diagnosis: RAZIA (acute kidney injury)  Assessment and Plan of Treatment: Improved after IV fluids.   Avoid taking (NSAIDs) - (ex: Ibuprofen, Advil, Celebrex, Naprosyn)  Avoid taking any nephrotoxic agents (can harm kidneys) - Intravenous contrast for diagnostic testing, combination cold medications.  Have all medications adjusted for your renal function by your Health Care Provider.  Blood pressure control is important.  Take all medication as prescribed.      Diagnosis: Neuroendocrine carcinoma  Assessment and Plan of Treatment: You have a known neuroendocrine cancer  Please follow up with with Dr. Sophia Prasad at [facility] [place]     PRINCIPAL DISCHARGE DIAGNOSIS  Diagnosis: Hemorrhagic shock  Assessment and Plan of Treatment: You were found to have severe blood loss due to bleeding ulcers and an artery that supplied your stomach. You required an embolization of one of our arteries to stop further bleeding. YOu were also evaluated by our gastroenterology teams multiple times with an endoscopy to stop the bleeding ulcers, unfortunately, your bleeding persisted. We were concerned so we discussed with our surgery colleagues who recommended ***  Pleaes follow up with your PCP in 1-2 weeks for further evlauation of your general health        SECONDARY DISCHARGE DIAGNOSES  Diagnosis: RAZIA (acute kidney injury)  Assessment and Plan of Treatment: Improved after IV fluids.   Avoid taking (NSAIDs) - (ex: Ibuprofen, Advil, Celebrex, Naprosyn)  Avoid taking any nephrotoxic agents (can harm kidneys) - Intravenous contrast for diagnostic testing, combination cold medications.  Have all medications adjusted for your renal function by your Health Care Provider.  Blood pressure control is important.  Take all medication as prescribed.      Diagnosis: Neuroendocrine carcinoma  Assessment and Plan of Treatment: You have a known neuroendocrine cancer  Please follow up with with Dr. Sophia Prasad at [facility] [place]    Diagnosis: CAD (coronary artery disease)  Assessment and Plan of Treatment: You have known coronary artery disease with recent stents placed. Unfortunately, you had severe GI Bleeds with the blood thinning medications so they had to be held. We discussed with our cardiology colleagues because we were concerned.   Please follow up with your Cardiologist in 1-2 weeks for followup of your cardiac heatlh     PRINCIPAL DISCHARGE DIAGNOSIS  Diagnosis: Hemorrhagic shock  Assessment and Plan of Treatment: You were found to have severe blood loss due to bleeding ulcers and an artery that supplied your stomach. You required an embolization of one of our arteries to stop further bleeding. You were also evaluated by our gastroenterology (GI) teams multiple times with an endoscopy to stop the bleeding ulcers, unfortunately, your bleeding persisted. We were concerned so we discussed with our surgery colleagues who recommended no intervention given your high surgical risk. You had another endoscopy that showed a bleeding vessel in your duodenum that the GI team clipped. For the past several days, your blood levels have remained stable, suggesting that the bleeding has stopped.  Pleaes follow up with your PCP in 1-2 weeks for further evlauation of your general health.        SECONDARY DISCHARGE DIAGNOSES  Diagnosis: RAZIA (acute kidney injury)  Assessment and Plan of Treatment: Improved after IV fluids.   Avoid taking (NSAIDs) - (ex: Ibuprofen, Advil, Celebrex, Naprosyn)  Avoid taking any nephrotoxic agents (can harm kidneys) - Intravenous contrast for diagnostic testing, combination cold medications.  Have all medications adjusted for your renal function by your Health Care Provider.  Blood pressure control is important.  Take all medication as prescribed.      Diagnosis: Neuroendocrine carcinoma  Assessment and Plan of Treatment: You have a known neuroendocrine cancer  Please follow up with with Dr. Sophia Prasad at [facility] [place]    Diagnosis: CAD (coronary artery disease)  Assessment and Plan of Treatment: You have known coronary artery disease with recent stents placed. Unfortunately, you had severe GI Bleeds with the blood thinning medications so they had to be held. We discussed with our cardiology colleagues because we were concerned. We tried giving you a small dose of aspirin, but you bled again soon after, so we continued to hold any blood thinning medications.  Please follow up with your Cardiologist in 1-2 weeks for followup of your cardiac heatlh     PRINCIPAL DISCHARGE DIAGNOSIS  Diagnosis: Hemorrhagic shock  Assessment and Plan of Treatment: You were found to have severe blood loss due to bleeding ulcers and an artery that supplied your stomach. You required an embolization of one of our arteries to stop further bleeding. You were also evaluated by our gastroenterology (GI) teams multiple times with an endoscopy to stop the bleeding ulcers, unfortunately, your bleeding persisted. We were concerned so we discussed with our surgery colleagues who recommended no intervention given your high surgical risk. You had another endoscopy that showed a bleeding vessel in your duodenum that the GI team clipped. During your time out of the MICU, your blood levels continued to drop occassionally, but imaging showed no active bleeding. However, for the past several days, your blood levels have remained stable, suggesting that the bleeding has stopped.  Pleaes follow up with your PCP in 1-2 weeks for further evlauation of your general health.        SECONDARY DISCHARGE DIAGNOSES  Diagnosis: RAZIA (acute kidney injury)  Assessment and Plan of Treatment: Improved after IV fluids.   Avoid taking (NSAIDs) - (ex: Ibuprofen, Advil, Celebrex, Naprosyn)  Avoid taking any nephrotoxic agents (can harm kidneys) - Intravenous contrast for diagnostic testing, combination cold medications.  Have all medications adjusted for your renal function by your Health Care Provider.  Blood pressure control is important.  Take all medication as prescribed.      Diagnosis: Neuroendocrine carcinoma  Assessment and Plan of Treatment: You have a known neuroendocrine cancer  [place] [facility] [place]    Diagnosis: CAD (coronary artery disease)  Assessment and Plan of Treatment: You have known coronary artery disease with recent stents placed. Unfortunately, you had severe GI Bleeds with the blood thinning medications so they had to be held. We discussed with our cardiology colleagues because we were concerned. We tried giving you a small dose of aspirin, but you bled again soon after, so we continued to hold any blood thinning medications.  Please follow up with your Cardiologist in 1-2 weeks for follow-up of your cardiac heatlh

## 2024-07-05 NOTE — PROGRESS NOTE ADULT - ATTENDING COMMENTS
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  Endocrine consulted for: Metastatic neuroendocrine tumor and orthostatic hypotension.  Adrenal insufficiency ruled out in the setting of AM cortisol of 17.  C/w midodrine as noted above.   Complex case requiring high level decision making. 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  Endocrine consulted for: Metastatic neuroendocrine tumor and orthostatic hypotension.  Adrenal insufficiency essentially ruled out in the setting of AM cortisol of 17.  C/w midodrine as noted above.     Complex case requiring high level decision making.

## 2024-07-05 NOTE — PROGRESS NOTE ADULT - PROBLEM SELECTOR PLAN 6
known metastatic pancreatic neuroendocrine tumor  - follows with Dr. Sophia Prasad at [facility] [place]  - radiation therapy currently on hold given recent cardiac issues awaiting cards clearance to resume  - outpatient f/u with Dr. Prasad upon discharge

## 2024-07-05 NOTE — CONSULT NOTE ADULT - ASSESSMENT
79 yo male with PMH of CAD s/p PCI x3 with most recent stent 6/12/24 on Plavix, chronic Afib on eliquis, orthostatic hypotension on midodrine, PAD, 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    1. Pancreatic NET- metastatic   -- was on lanreotide then had POD in liver and started on peptide receptor radiotherapy (PRRT)- typically given every 8 weeks for 4 doses. He received one dose on 10/20/2023 and planned to get his 2nd dose at the end of December however his course was complicated by multiple hospitalizations that were noncancer related (decompensated heart failure).   -- 5/28/24 PET Dotatate (compared to 9/2023): severeal new somatostatin avid osseous lesions, some faintly sclerotic, suspicious for mets. Increased upper RP nodes, probably metastatic. Decreased intensely tracer avid right supradiaphragmatic and upper abdominal nodes, suspicious for metastasis. Redemonstrated intensely somatostatin avid bilobar hepatic lesions, consistent with metastses, again morphologically difficult to delineate.   -- f/u with Dr Sophia Prasad at [facility] [place] after discharge, no plan for treatment inpatient     2. Hypotension   - othrostatic positive  - diurectic held   - appreciate endocrine, nephro, cardio eval     Shalom Casarez MD   Hematology/Oncology   [facility]   [phone]

## 2024-07-05 NOTE — DISCHARGE NOTE PROVIDER - NSDCMRMEDTOKEN_GEN_ALL_CORE_FT
allopurinol 100 mg oral tablet: 1 tab(s) orally 2 times a day  ascorbic acid 500 mg oral tablet: 1 tab(s) orally once a day  clopidogrel 75 mg oral tablet: 1 tab(s) orally once a day  colchicine 0.6 mg oral tablet: 1 tab(s) orally once a day  Eliquis 5 mg oral tablet: 1 tab(s) orally 2 times a day  folic acid 1 mg oral tablet: 1 tab(s) orally once a day  Jardiance 10 mg oral tablet: 1 tab(s) orally once a day  midodrine 10 mg oral tablet: 1 tab(s) orally 3 times a day  midodrine 5 mg oral tablet: 1 tab(s) orally 3 times a day  Multiple Vitamins oral tablet: 1 tab(s) orally once a day  rosuvastatin 40 mg oral tablet: 1 tab(s) orally once a day  sotalol 80 mg oral tablet: 0.5 tab(s) orally once a day  Zetia 10 mg oral tablet: 1 tab(s) orally once a day   allopurinol 100 mg oral tablet: 1 tab(s) orally 2 times a day  ascorbic acid 500 mg oral tablet: 1 tab(s) orally once a day  clopidogrel 75 mg oral tablet: 1 tab(s) orally once a day  colchicine 0.6 mg oral tablet: 1 tab(s) orally once a day  Eliquis 5 mg oral tablet: 1 tab(s) orally 2 times a day  folic acid 1 mg oral tablet: 1 tab(s) orally once a day  Jardiance 10 mg oral tablet: 1 tab(s) orally once a day  midodrine 10 mg oral tablet: 1 tab(s) orally 3 times a day  midodrine 5 mg oral tablet: 1 tab(s) orally 3 times a day  Multiple Vitamins oral tablet: 1 tab(s) orally once a day  Northera 100 mg oral capsule: 1 cap(s) orally 3 times a day  rosuvastatin 40 mg oral tablet: 1 tab(s) orally once a day  sotalol 80 mg oral tablet: 0.5 tab(s) orally once a day  Zetia 10 mg oral tablet: 1 tab(s) orally once a day

## 2024-07-05 NOTE — DIETITIAN INITIAL EVALUATION ADULT - NSFNSGIIOFT_GEN_A_CORE
Pt reported no GI distress at this time.   - Ordered for Zofran in-house (per orders).  - Last BM: 7/5 (per patient and flow sheets).  Continue to monitor bowel movements and bowel movement regularity.

## 2024-07-05 NOTE — DIETITIAN INITIAL EVALUATION ADULT - FACTORS AFF FOOD INTAKE
institutional food dislike/change in sense of smell or taste/persistent lack of appetite/other (specify)

## 2024-07-05 NOTE — DISCHARGE NOTE PROVIDER - NSDCCPTREATMENT_GEN_ALL_CORE_FT
PRINCIPAL PROCEDURE  Procedure: EGD  Findings and Treatment:   < end of copied text >  Findings:       LA Grade B (one or more mucosal breaks greater than 5 mm, not extending between the tops of        two mucosal folds) esophagitis with no bleeding was found.       The exam of the esophagus was otherwise normal.       The stomach was normal.       Diffuse erosions and ulceration seen throughout the duodenum with two cratered ulcers seen.        One cratered ulcer with flat pigmented spot was seen in the bulb/sweep of the duodenum. A        second ulcer was seen in 2nd partof duodenum with visible vessel with active bleeding. For        this lesion, To stop active bleeding, three hemostatic clips were successfully placed (MR        conditional) as well as 4 mL of a 1:10,000 solution of epinephrine injection for hemostasis.        After this, nexpowder spray was deployed across both areas of ulceration. There was no        bleeding at the end of the procedure.       The exam of the duodenum was otherwise normal.                                                < from: Upper Endoscopy (07.18.24 @ 13:41) >

## 2024-07-05 NOTE — PROGRESS NOTE ADULT - SUBJECTIVE AND OBJECTIVE BOX
[facility]   [phone]  RENAL FOLLOW UP NOTE  --------------------------------------------------------------------------------  Chief Complaint:    24 hour events/subjective:    seen earlier   states ambulated to the BR and felt fatigue/dizziness when returned back to bed.     PAST HISTORY  --------------------------------------------------------------------------------  No significant changes to PMH, PSH, FHx, SHx, unless otherwise noted    ALLERGIES & MEDICATIONS  --------------------------------------------------------------------------------  Allergies    No Known Allergies    Intolerances      Standing Inpatient Medications  allopurinol 100 milliGRAM(s) Oral two times a day  apixaban 5 milliGRAM(s) Oral every 12 hours  ascorbic acid 500 milliGRAM(s) Oral daily  atorvastatin 80 milliGRAM(s) Oral at bedtime  clopidogrel Tablet 75 milliGRAM(s) Oral daily  colchicine 0.6 milliGRAM(s) Oral daily  folic acid 1 milliGRAM(s) Oral daily  lactated ringers. 1000 milliLiter(s) IV Continuous <Continuous>  midodrine. 20 milliGRAM(s) Oral three times a day  multivitamin 1 Tablet(s) Oral daily  sodium chloride 0.9%. 1000 milliLiter(s) IV Continuous <Continuous>  sotalol. 40 milliGRAM(s) Oral every 24 hours  thiamine 100 milliGRAM(s) Oral daily    PRN Inpatient Medications  acetaminophen     Tablet .. 650 milliGRAM(s) Oral every 6 hours PRN  ondansetron Injectable 4 milliGRAM(s) IV Push every 8 hours PRN      REVIEW OF SYSTEMS  --------------------------------------------------------------------------------    Gen: denies  fevers/chills,  CVS: denies chest pain/palpitations  Resp: denies SOB/Cough  GI: Denies N/V/Abd pain  : Denies dysuria    VITALS/PHYSICAL EXAM  --------------------------------------------------------------------------------  T(C): 36.9 (07-05-24 @ 19:48), Max: 36.9 (07-05-24 @ 19:48)  HR: 68 (07-05-24 @ 19:48) (68 - 91)  BP: 119/76 (07-05-24 @ 19:48) (101/61 - 119/76)  RR: 18 (07-05-24 @ 19:48) (18 - 18)  SpO2: 98% (07-05-24 @ 19:48) (95% - 98%)  Wt(kg): --        07-04-24 @ 07:01  -  07-05-24 @ 07:00  --------------------------------------------------------  IN: 1385 mL / OUT: 900 mL / NET: 485 mL    07-05-24 @ 07:01  -  07-05-24 @ 21:15  --------------------------------------------------------  IN: 580 mL / OUT: 500 mL / NET: 80 mL      Physical Exam:  	    	Gen: Non toxic comfortable appearing   	no jvd  	Pulm: decrease bs  no rales or ronchi or wheezing  	CV: RRR, S1S2; no rub  	Abd: +BS, soft, nontender/nondistended  	: No suprapubic tenderness  	UE: Warm, no cyanosis  no clubbing,  no edema; no asterixis  	LE: Warm, no cyanosis  no clubbing, no edema  	Neuro: alert and oriented. speech coherent       LABS/STUDIES  --------------------------------------------------------------------------------              11.1   9.13  >-----------<  330      [07-05-24 @ 07:17]              34.1     140  |  106  |  22  ----------------------------<  106      [07-05-24 @ 07:17]  3.8   |  17  |  0.99        Ca     9.4     [07-05-24 @ 07:17]            Creatinine Trend:  SCr 0.99 [07-05 @ 07:17]  SCr 1.08 [07-04 @ 10:37]  SCr 1.38 [07-03 @ 06:51]  SCr 1.59 [07-02 @ 14:12]  SCr 1.18 [06-29 @ 07:03]        Urine Creatinine 108      [07-02-24 @ 20:10]  Urine Sodium 17      [07-02-24 @ 20:10]  Urine Urea Nitrogen 730      [07-02-24 @ 20:10]    TSH 2.47      [07-04-24 @ 12:31]

## 2024-07-05 NOTE — DIETITIAN INITIAL EVALUATION ADULT - ETIOLOGY
related to inability to meet sufficient protein-energy needs in setting of diminished appetite, chronic illness, increased metabolic demand related to increased metabolic demand for nutrients

## 2024-07-05 NOTE — PROGRESS NOTE ADULT - PROBLEM SELECTOR PLAN 3
-  insetting of likely known metastatic pancreatic neuroendocrine tumor  - c/w regular diet with prosource gelatein plus as per Nutrition recs last admission  - c/w zofran PRN nausea  - may benefit from appetite stimulant such as Mirtazapine if continues to persists

## 2024-07-05 NOTE — DIETITIAN INITIAL EVALUATION ADULT - SIGNS/SYMPTOMS
as evidenced by pt meeting <75% EER >1 month, >20% wt loss x 1 year, moderate muscle/fat loss. as evidenced by pt with neuroendocrine carcinoma.

## 2024-07-05 NOTE — DISCHARGE NOTE PROVIDER - NSDCFUADDAPPT_GEN_ALL_CORE_FT
no fever APPTS ARE READY TO BE MADE: [ ] YES    Best Family or Patient Contact (if needed):    Additional Information about above appointments (if needed):    1: Gastroenterology?  2:   3:     Other comments or requests:

## 2024-07-05 NOTE — DISCHARGE NOTE PROVIDER - HOSPITAL COURSE
HPI:  77 yo male with PMH of CAD s/p PCI x3 with most recent stent 6/12/24 on Plavix, chronic Afib on eliquis, orthostatic hypotension on midodrine, PAD, 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 Patient states since discharge on this past Saturday, he continued to feel ongoing generalized weakness and dizziness with positional changes despite compliance with home midodrine 15mg TID and holding torsemide as instructed. Admits to poor PO intake of fluids/solute due to ongoing issues with poor appetite and nausea with meals which is not new. Denies any fever, chills, chest pain, SOB, cough, abd pain, dysuria nor urinary frequency. Has chronic diarrhea that is unchanged from his baseline.     In the ED, vitals notable for SBP 92-11s. Labs notable for elevated BUN/Cr 31/1.59 (from 30/1.18 on day of discharge 6/29/24). CXR with clear lungs. Abd US with innumerable intrahepatic echogenic masses consistent with known metastatic neuroendocrine tumor. Admitted to medicine for symptomatic hypotension and RAZIA. (02 Jul 2024 18:34)    Hospital Course:  Pt admitted for symptomatic hypotension. Reportedly with SBP in 80s at PCP office despite taking midodrine dose in AM. Pt has had ongoing dizziness with positional changes and poor PO intake of fluids/solute due to ongoing lack of appetite. Nephrology and Cardiology consulted.   Orthostatics +emily. Home Torsemide held, and home Midodrine increased from 15mg to 20mg TID.   Also noted with RAZIA on CKD2. Likely i/s/o hypotension ATN.  Cr 1.59 on admission, improved s/p IVF. US abd negative for hydro.   Heme/onc and Endo consulted for neuroendocrine carcinoma. Pt w/ known metastatic pancreatic neuroendocrine tumor, follows with Dr. Sophia Prasad at [facility]. No plans for inpatient tx.   Endo recs: ____   Pt to c/w home meds for CAD, Afib, Pericarditis and gout.     Important Medication Changes and Reason:  - Midodrine increased to 20mg TID    Active or Pending Issues Requiring Follow-up:  - f/u with Dr. Hameed  - f/u with Dr. Sophia Prasad    Advanced Directives:   [x ] Full code  [ ] DNR  [ ] Hospice    Discharge Diagnoses:  Orthostatic Hypotension  Neuroendocrine carcinoma  RAZIA on CKD       Discharge Summary     Admit Date: 07-02-24  Discharge Date:    Admission diagnoses:   Acute renal failure        Discharge diagnoses:   Upper GI Bleed  CAD s/p new PCI without DAPT    Hospital Course:   For full details, please see H&P, progress notes, consult notes and ancillary notes. Briefly, NANDO HERNANDEZ is a 79y Male with a history of CAD s/p PCI x3 with most recent stent 6/12/24 on Plavix, chronic Afib on eliquis, orthostatic hypotension on midodrine, PAD, neuroendocrine tumor. The patient's hospital course will be summarized in a problem based format.    # ***    # ***    On day of discharge, patient is clinically stable with no new exam findings or acute symptoms compared to prior. The patient was seen by the attending physician on the date of discharge and deemed stable and acceptable for discharge. The patient's chronic medical conditions were treated accordingly per the patient's home medication regimen. The patient's medication reconciliation (with changes made to chronic medications), follow up appointments, discharge orders, instructions, and significant lab and diagnostic studies are as noted.     Discharge follow up action items:     1. Follow up with PCP in 1-2 weeks.   2. Follow up labs, path, & imaging ***  3. Medication changes ***  4. On hold medications ***    Patient's ordered code status: ***    Patient disposition: ***     Discharge Summary     Admit Date: 07-02-24  Discharge Date:    Admission diagnoses:   Acute renal failure    Discharge diagnoses:   Upper GI Bleed  CAD s/p new PCI without DAPT    Hospital Course:   For full details, please see H&P, progress notes, consult notes and ancillary notes. Briefly, NANDO HERNANDEZ is a 79y Male with a history of CAD s/p PCI x3 with most recent stent 6/12/24 on Plavix, chronic Afib on eliquis, orthostatic hypotension on midodrine, PAD, neuroendocrine tumor. The patient's hospital course will be summarized in a problem based format.    79M hx of metastatic neuroendocrine pancreatic cancer, hx of CAD s/p PCI x3 with stents placed on 6/12/24, chronic afib, orthostatic hypotension on midodrine, and PAD initially presented with hypotension; then had multiple Coffee Ground emesis/Melena; had urgent EGD; became hypotensive; required pressors; CTA showed active hemorrhage, IR embolized GDA, subsequent EGD showing Grade B esophagitis with diffuse duodenal ulceration. Patient trialed on ASA, but had repeat bleed, now off all DAPT/AC. Patient downgraded to floors from MICU with ccb C Diff s/p Tx, afib RVR (on Lopressor/Dig). Since downgrade; patient with persistent melena and downtrending Hgb; requiring further pRBC. Surgery recommended ***    # Upper GI Bleed  -coffee ground emesis and melanotic stools multiple times. S/P IR Embolization of GDA.  -EGD showing Grade B Esophagitis with Diffuse ulceration of the duodenum  -Trialed Aspirin/Plavix monotherapy with persistent bleed; off of DAPT despite recent PCI  - Protonix BID per GI  - CTAP IV contrast   - can consider iron chelation therapy given many transfusions    #CAD  - PCI x3 6/12/24  - Trialed ASA/Plavix monotherapy; unable to tolerate  -     #Permanent AFib  - Unable to tolerate Eliquis    On day of discharge, patient is clinically stable with no new exam findings or acute symptoms compared to prior. The patient was seen by the attending physician on the date of discharge and deemed stable and acceptable for discharge. The patient's chronic medical conditions were treated accordingly per the patient's home medication regimen. The patient's medication reconciliation (with changes made to chronic medications), follow up appointments, discharge orders, instructions, and significant lab and diagnostic studies are as noted.     Discharge follow up action items:     1. Follow up with PCP in 1-2 weeks.   2. Follow up labs, path, & imaging ***  3. Medication changes ***  4. On hold medications ***    Patient's ordered code status: Full Code    Patient disposition: ***     Discharge Summary     Admit Date: 07-02-24  Discharge Date:    Admission diagnoses:   Acute renal failure    Discharge diagnoses:   Upper GI Bleed  CAD s/p new PCI without DAPT    Hospital Course:   For full details, please see H&P, progress notes, consult notes and ancillary notes.     78 yo male with metastatic neuroendocrine pancreatic cancer (tx on hold) and PMH of CAD s/p PCI x3 (recent stent 6/12/24) on Plavix and eliquis, chronic Afib on eliquis, orthostatic hypotension on midodrine, PAD presented to ED on 7/2 from PCP's office for peristent hypotension and admitted to medicine for symptomatic hypotension and RAZIA. While on floors (7/8), pt was noted to have acute onset of melena x5 and coffee ground emesis x2-3. RRT was called on 7/9 AM for hypotension, hgb dropped from 9.2 to 7.4 (admission baseline 10-11), and 1 unit pRBC given. GI was consulted and underwent EGD on 7/9 with uncontrolled bleeding and hypotension, requiring pressors. IR performed GDA embolization, and patient admitted to MICU for further monitoring i.s.o hemorrhagic shock 2/2 GI bleed.  On repeat EGD, oozing but no active bleeding was noted. However, patient had another melanotic stool 7/18 with Hgb drop (10.1 to 7.1), requiring multiple units of pRBCs. On EGD, GI found duodenal bleed, intervening with 3x clips and epi. Eventually, patient able to be weaned off pressors and stable for floors. Patient continued to have intermittent melanotic stools with hemoglobin drops, but had multiple CTAPs with no active bleeds. Per GI and surgery, no acute interventions recommended. Patient received total transfusions of *** pRBCs, *** platelets, *** FFP.     Hospital course c/b multiple infections including C. diff (completed vancomycin course), Klebsiella, Pseudomonas.    Hospital course c/b afib with RVR into 150s. Patient started on amiodarone and digoxin in the MICU and transitioned to digoxin on the floors with good response.     Hospital course c/b anasarca secondary to significant volume given during admission. Patient given bumex pushes with appropriate response.    Goals of care discussions were held throughout admission. Family decided that ***    ***Note***Grand Total RBCs: 22 units received (incl. 1x Washed RBC) (as of 8/13)  Grand Total Plt: 5u received  Grand Total Plasma: 5u received    On day of discharge, patient is clinically stable with no new exam findings or acute symptoms compared to prior. The patient was seen by the attending physician on the date of discharge and deemed stable and acceptable for discharge. The patient's chronic medical conditions were treated accordingly per the patient's home medication regimen. The patient's medication reconciliation (with changes made to chronic medications), follow up appointments, discharge orders, instructions, and significant lab and diagnostic studies are as noted.     Discharge follow up action items:     1. Follow up with PCP in 1-2 weeks.   2. Follow up labs, path, & imaging ***  3. Medication changes ***  4. On hold medications ***    Patient's ordered code status: Full Code    Patient disposition: ***     Discharge Summary     Admit Date: 07-02-24  Discharge Date:    Admission diagnoses:   Acute renal failure    Discharge diagnoses:   Upper GI Bleed  CAD s/p new PCI without DAPT    Hospital Course:   For full details, please see H&P, progress notes, consult notes and ancillary notes.     78 yo male with metastatic neuroendocrine pancreatic cancer (tx on hold) and PMH of CAD s/p PCI x3 (recent stent 6/12/24) on Plavix and eliquis, chronic Afib on eliquis, orthostatic hypotension on midodrine, PAD presented to ED on 7/2 from PCP's office for persistent hypotension and admitted to medicine for symptomatic hypotension and RAZIA. While initially on floors , pt was noted to have acute onset of melena x5 and coffee ground emesis x2-3 (7/8). RRT was called on 7/9 AM for hypotension and hgb drop from 9.2 to 7.4 (admission baseline 10-11), and 1 unit pRBC given. GI was consulted and patient underwent EGD on 7/9 with uncontrolled bleeding and hypotension requiring pressors. IR performed GDA embolization, and patient was admitted to MICU for further monitoring i.s.o hemorrhagic shock 2/2 GI bleed. On repeat EGD, oozing but no active bleeding was noted. However, patient had another melanotic stool on 7/18 with Hgb drop (10.1 to 7.1), requiring multiple units of pRBCs. On this EGD, GI found duodenal bleed, intervening with 3x clips and epi. Eventually, patient able to be weaned off pressors and stable for floors.    On the floors, patient continued to have intermittent melanotic stools with hemoglobin drops, but had multiple CTAPs with no active bleeds. Per GI and surgery, no acute interventions recommended. Patient has remained stable for the past several days***. Patient received total transfusions of *** pRBCs, *** platelets, *** FFP during this admission.     Hospital course c/b multiple infections including C. diff (Vancomycin 7/14-7/25), Klebsiella/Raoultella orthinolytica ET tube (Zosyn 7/11-7/18, Meropenem 7/18-7/25), carbapenem-resistent Pseudomonas sputum (cefepime 8/2-8/8), and Klebsiella UTI (cefepime 8/2-8/8).     Hospital course c/b afib with RVR into 150s. Patient started on amiodarone and digoxin in the MICU and transitioned to digoxin on the floors with good response. Cardiology following throughout admission.    Hospital course c/b anasarca secondary to significant volume given during admission. Patient given albumin and bumex pushes with appropriate response.    Goals of care discussions were held throughout admission. Family decided that ***    ***Note***Grand Total RBCs: 22 units received (incl. 1x Washed RBC) (as of 8/13)  Grand Total Plt: 5u received  Grand Total Plasma: 5u received    On day of discharge, patient is clinically stable with no new exam findings or acute symptoms compared to prior. The patient was seen by the attending physician on the date of discharge and deemed stable and acceptable for discharge. The patient's chronic medical conditions were treated accordingly per the patient's home medication regimen. The patient's medication reconciliation (with changes made to chronic medications), follow up appointments, discharge orders, instructions, and significant lab and diagnostic studies are as noted.     Discharge follow up action items:     1. Follow up with PCP in 1-2 weeks.   2. Follow up labs, path, & imaging ***  3. Medication changes ***  4. On hold medications ***    Patient's ordered code status: Full Code    Patient disposition: ***     Discharge Summary     Admit Date: 07-02-24  Discharge Date:    Admission diagnoses:   Acute renal failure    Discharge diagnoses:   Upper GI Bleed  CAD s/p new PCI without DAPT    Hospital Course:   For full details, please see H&P, progress notes, consult notes and ancillary notes.     80 yo male with metastatic neuroendocrine pancreatic cancer (tx on hold) and PMH of CAD s/p PCI x3 (recent stent 6/12/24) on Plavix and eliquis, chronic Afib on eliquis, orthostatic hypotension on midodrine, PAD presented to ED on 7/2 from PCP's office for persistent hypotension and admitted to medicine for symptomatic hypotension and RAZIA. While initially on floors , pt was noted to have acute onset of melena x5 and coffee ground emesis x2-3 (7/8). RRT was called on 7/9 AM for hypotension and hgb drop from 9.2 to 7.4 (admission baseline 10-11), and 1 unit pRBC given. GI was consulted and patient underwent EGD on 7/9 with uncontrolled bleeding and hypotension requiring pressors. IR performed GDA embolization, and patient was admitted to MICU for further monitoring i.s.o hemorrhagic shock 2/2 GI bleed. On repeat EGD, oozing but no active bleeding was noted. However, patient had another melanotic stool on 7/18 with Hgb drop (10.1 to 7.1), requiring multiple units of pRBCs. On this EGD, GI found duodenal bleed, intervening with 3x clips and epi. Eventually, patient able to be weaned off pressors and stable for floors.    On the floors, patient continued to have intermittent melanotic stools with hemoglobin drops, but had multiple CTAPs with no active bleeds. Per GI and surgery, no acute interventions recommended. Patient has remained stable for the past several days***. Patient received total transfusions of 22u pRBCs during this admission.     Hospital course c/b multiple infections including C. diff (Vancomycin 7/14-7/25), Klebsiella/Raoultella orthinolytica ET tube (Zosyn 7/11-7/18, Meropenem 7/18-7/25), carbapenem-resistent Pseudomonas sputum (cefepime 8/2-8/8), and Klebsiella UTI (cefepime 8/2-8/8).     Hospital course c/b afib with RVR into 150s. Patient started on amiodarone and digoxin in the MICU and transitioned to digoxin on the floors with good response. Cardiology following throughout admission.    Hospital course c/b anasarca secondary to significant volume given during admission. Patient given albumin and bumex pushes with appropriate response.    Goals of care discussions were held throughout admission. Family decided that they would like to pursue comfort care measures.    Grand Total RBCs: 22 units received (incl. 1x Washed RBC)   Grand Total Plt: 5u received  Grand Total Plasma: 5u received    On day of discharge, patient is clinically stable with no new exam findings or acute symptoms compared to prior. The patient was seen by the attending physician on the date of discharge and deemed stable and acceptable for discharge. The patient's chronic medical conditions were treated accordingly per the patient's home medication regimen. The patient's medication reconciliation (with changes made to chronic medications), follow up appointments, discharge orders, instructions, and significant lab and diagnostic studies are as noted.         Patient's ordered code status: DNR/DNI    Patient disposition: PCU

## 2024-07-05 NOTE — DIETITIAN INITIAL EVALUATION ADULT - OTHER INFO
Weights:  - UBW (per patient): 300-290 pounds x 1 year ago, current weight ~215 pounds   - Dosing Weight (per chart): 217.5 pounds (bed) (7/3)  - Daily Weights (per flow sheets): No daily weights noted to assess at this time.   - Bed Scale Weight (taken by RD): 219.6 pounds (7/5)  - Per [facility] HIE: 217.8 pounds (7/4/24), 225.14 pounds (6/24/24), 235.1 pounds (5/11/24), 245 pounds (3/13/24), 246 pounds (1/29/24), 276 pounds (7/18/23)  Using weight from 7/18/23 and current dosing weight, pt with ~59 pound weight loss x 1 year (21%), clinically significant. RD to continue to monitor weights and trends as able.     Nutritional Concerns:  - Pt with failure to thrive, per chart " insetting of likely known metastatic pancreatic neuroendocrine tumor."   - Pt with neuroendocrine carcinoma (per chart). Radiation therapy on hold (per chart).  - Per Endocrine, "Pt also with T2m based on prior A1c." Glucose 106 mg/dL today (7/5) (per chart). Recommend obtaining updated A1C if medically feasible.  - Ordered for Sodium Chloride 0.9% and IV Lactated Ringers (per orders).  - Ordered for Vitamin C, Folic Acid and Multivitamin (per orders).

## 2024-07-05 NOTE — DIETITIAN INITIAL EVALUATION ADULT - NSFNSNUTRHOMESUPPLEMENTFT_GEN_A_CORE
Pt stated he takes Vitamin C, Multivitamin and Folic Acid prior to admission.  Endorsed drinking Ensure 1x/day prior to admission.

## 2024-07-05 NOTE — DISCHARGE NOTE PROVIDER - CARE PROVIDER_API CALL
YUSRA ORO, Phys,    Phone: [phone]  Fax: ()-  Follow Up Time:    YUSRA ORO, Phys,    Phone: ()-  Fax: ()-  Follow Up Time:     Agustin Hameed  Cardiology  [address], [address]  [place] [address]  Phone: [phone]  Fax: [phone]  Established Patient  Follow Up Time: 1 week

## 2024-07-05 NOTE — PROGRESS NOTE ADULT - PROBLEM SELECTOR PLAN 1
reported with SBP 80s in PCP office despite taking midodrine dose in AM. has had ongoing dizziness with positional changes and poor PO intake of fluids/solute due to ongoing lack of appetite  - orthostatics +ve  - continue to hold home torsemide   -  home midodrine increased from 15mg TID to 20mg TID  -  f/up Cardiology and Nephrology reccs   - d/w Nephrology ;  may need to d/c Sotalol and switch to another agent ? Amiodarone

## 2024-07-05 NOTE — DIETITIAN INITIAL EVALUATION ADULT - PERTINENT LABORATORY DATA
07-05    140  |  106  |  22  ----------------------------<  106<H>  3.8   |  17<L>  |  0.99    Ca    9.4      05 Jul 2024 07:17    A1C with Estimated Average Glucose Result: 6.5 % (11-17-23 @ 06:38)  A1C with Estimated Average Glucose Result: 6.7 % (10-25-23 @ 06:57)

## 2024-07-05 NOTE — DISCHARGE NOTE PROVIDER - NSDCFUSCHEDAPPT_GEN_ALL_CORE_FT
Agustin Hameed  [facility] Physician Counts include 234 beds at the [facility]  CARDIOLOGY [address]   Scheduled Appointment: 07/11/2024

## 2024-07-05 NOTE — DIETITIAN INITIAL EVALUATION ADULT - OTHER CALCULATIONS
Estimated protein-energy needs calculated using IBW of 190 pounds with consideration for neuroendocrine carcinoma, failure to thrive, malnutrition.   Defer fluid calculations to the medical team.

## 2024-07-05 NOTE — PROGRESS NOTE ADULT - PROBLEM SELECTOR PLAN 2
- superimposed on CKD2.   - improved s/p IV hydration ; Cr 0.99 today  - continue to hold home Torsemide and Jardiance  - US abd negative for hydro  - renally dose meds to GFR, avoid nephrotoxic agents

## 2024-07-05 NOTE — DIETITIAN INITIAL EVALUATION ADULT - PROBLEM SELECTOR PLAN 6
known metastatic pancreatic neuroendocrine tumor  - [place] [facility] [place]  - [place] therapy currently on hold given recent cardiac issues awaiting cards clearance to resume  - outpatient f/u with Dr. Prasad upon discharge

## 2024-07-05 NOTE — DISCHARGE NOTE PROVIDER - PROVIDER TOKENS
PROVIDER:[TOKEN:[[id number]]] PROVIDER:[TOKEN:[[id number]]],PROVIDER:[TOKEN:[[id number]],FOLLOWUP:[1 week],ESTABLISHEDPATIENT:[T]]

## 2024-07-05 NOTE — CONSULT NOTE ADULT - SUBJECTIVE AND OBJECTIVE BOX
HPI:  77 yo male with PMH of CAD s/p PCI x3 with most recent stent 6/12/24 on Plavix, chronic Afib on eliquis, orthostatic hypotension on midodrine, PAD, 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 Patient states since discharge on this past Saturday, he continued to feel ongoing generalized weakness and dizziness with positional changes despite compliance with home midodrine 15mg TID and holding torsemide as instructed. Admits to poor PO intake of fluids/solute due to ongoing issues with poor appetite and nausea with meals which is not new. Denies any fever, chills, chest pain, SOB, cough, abd pain, dysuria nor urinary frequency. Has chronic diarrhea that is unchanged from his baseline.     In the ED, vitals notable for SBP 92-11s. Labs notable for elevated BUN/Cr 31/1.59 (from 30/1.18 on day of discharge 6/29/24). CXR with clear lungs. Abd US with innumerable intrahepatic echogenic masses consistent with known metastatic neuroendocrine tumor. Admitted to medicine for symptomatic hypotension and RAZIA. (02 Jul 2024 18:34)      PAST MEDICAL & SURGICAL HISTORY:  Hypertension      Hypercholesterolemia      PAD (peripheral artery disease)      Neuroendocrine carcinoma      Hepatic carcinoma      Atrial fibrillation and flutter      CAD (coronary artery disease)      S/P knee replacement, bilateral      S/P hip replacement  right hip      History of hernia repair      H/O laminectomy      History of lumbosacral spine surgery      History of cardioversion          Allergies    No Known Allergies    Intolerances        MEDICATIONS  (STANDING):  allopurinol 100 milliGRAM(s) Oral two times a day  apixaban 5 milliGRAM(s) Oral every 12 hours  ascorbic acid 500 milliGRAM(s) Oral daily  atorvastatin 80 milliGRAM(s) Oral at bedtime  clopidogrel Tablet 75 milliGRAM(s) Oral daily  colchicine 0.6 milliGRAM(s) Oral daily  folic acid 1 milliGRAM(s) Oral daily  lactated ringers. 1000 milliLiter(s) (75 mL/Hr) IV Continuous <Continuous>  midodrine. 20 milliGRAM(s) Oral three times a day  multivitamin 1 Tablet(s) Oral daily  sodium chloride 0.9%. 1000 milliLiter(s) (50 mL/Hr) IV Continuous <Continuous>  sotalol. 40 milliGRAM(s) Oral every 24 hours    MEDICATIONS  (PRN):  acetaminophen     Tablet .. 650 milliGRAM(s) Oral every 6 hours PRN Temp greater or equal to 38C (100.4F), Mild Pain (1 - 3)  ondansetron Injectable 4 milliGRAM(s) IV Push every 8 hours PRN Nausea and/or Vomiting      FAMILY HISTORY:      SOCIAL HISTORY: No EtOH, no tobacco    REVIEW OF SYSTEMS:    CONSTITUTIONAL: No weakness, fevers or chills  EYES/ENT: No visual changes;  No vertigo or throat pain   NECK: No pain or stiffness  RESPIRATORY: No cough, wheezing, hemoptysis; No shortness of breath  CARDIOVASCULAR: No chest pain or palpitations  GASTROINTESTINAL: No abdominal or epigastric pain. No nausea, vomiting, or hematemesis; No diarrhea or constipation. No melena or hematochezia.  GENITOURINARY: No dysuria, frequency or hematuria  NEUROLOGICAL: No numbness or weakness  SKIN: No itching, burning, rashes, or lesions   All other review of systems is negative unless indicated above.        T(F): 97.5 (07-05-24 @ 08:17), Max: 98 (07-04-24 @ 16:26)  HR: 78 (07-05-24 @ 06:44)  BP: 107/68 (07-05-24 @ 06:44)  RR: 18 (07-05-24 @ 08:17)  SpO2: 95% (07-05-24 @ 08:17)  Wt(kg): --    GENERAL: NAD, well-developed  HEAD:  Atraumatic, Normocephalic  EYES: EOMI, PERRLA, conjunctiva and sclera clear  NECK: Supple, No JVD  CHEST/LUNG: Clear to auscultation bilaterally; No wheeze  HEART: Regular rate and rhythm; No murmurs, rubs, or gallops  ABDOMEN: Soft, Nontender, Nondistended; Bowel sounds present  EXTREMITIES:  2+ Peripheral Pulses, No clubbing, cyanosis, or edema  NEUROLOGY: non-focal  SKIN: No rashes or lesions                          11.1   9.13  )-----------( 330      ( 05 Jul 2024 07:17 )             34.1       07-05    140  |  106  |  22  ----------------------------<  106<H>  3.8   |  17<L>  |  0.99    Ca    9.4      05 Jul 2024 07:17

## 2024-07-05 NOTE — DISCHARGE NOTE PROVIDER - CARE PROVIDERS DIRECT ADDRESSES
,DirectAddress_Unknown ,DirectAddress_Unknown,[email or web address]

## 2024-07-05 NOTE — PROGRESS NOTE ADULT - ASSESSMENT
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 His care is managed at [facility]. S/p radiation therapy 10/2023. Denies ever having flushing. Has had diarrhea for a long time. Reports that significant weakness began after radiation therapy.    He reports having had orthostatic lightheadedness since his last hospitalization here (6/21-29) for which he was hospitalized for failing his cardiac stress test and had RAINES. His torsemide (which he has been taking for a long time for his BLE swelling) was stopped. Jardiance was also discontinued on discharge (he thinks indication was for cardiac). He has continued taking midodrine 15mg TID at home since discharge.    Orthostatic hypotension is not a classic symptom of NET but can be infrequently seen with NET.    Recommendations:  - AM cortisol 17.7, ACTH 6.3 low/borderline, not suggestive of adrenal insufficiency. Can repeat ACTH as outpatient  - FT4 1.6, TSH 2.47  - F/u patient's doctors at [facility] to obtain more detailed history regarding neuroendocrine tumor diagnosis and workup  - Consider additional fluids as patient not tolerating PO; patient has received 2L this admission with grossly normal recent TTE  - c/w midodrine 20 mg tid   - Optimize PO intake; consider nutrition consult    #H/o T2DM  - A1c 6.5%  - Goal -180  - Can monitor off correctional scale for now    Discussed with primary team    Patrizia Weber MD  Endocrine Fellow  Can be reached via teams. For follow up questions, discharge recommendations, or new consults, please call answering service at [phone] (weekdays); [phone] (nights/weekends). 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 His care is managed at [facility] [place]. S/p radiation therapy 10/2023. Denies ever having flushing. Has had diarrhea for a long time. Reports that significant weakness began after radiation therapy.    He reports having had orthostatic lightheadedness since his last hospitalization here (6/21-29) for which he was hospitalized for failing his cardiac stress test and had RAINES. His torsemide (which he has been taking for a long time for his BLE swelling) was stopped. Jardiance was also discontinued on discharge (he thinks indication was for cardiac). He has continued taking midodrine 15mg TID at home since discharge.    Orthostatic hypotension is not a classic symptom of NET but can be infrequently seen with NET.    Recommendations:  - AM cortisol 17.7, ACTH 6.3 low/borderline, not suggestive of adrenal insufficiency. Can repeat ACTH as outpatient  - FT4 1.6, TSH 2.47  - F/u patient's doctors at [facility] to obtain more detailed history regarding neuroendocrine tumor diagnosis and workup  - Consider additional fluids as patient not tolerating PO; patient has received 2L this admission with grossly normal recent TTE  - c/w midodrine 20 mg tid   - Optimize PO intake; consider nutrition consult    #H/o T2DM  - A1c 6.5%  - Goal -180  - Can monitor off correctional scale for now    D/w Dr. Sam Weber MD  Endocrine Fellow  Can be reached via teams. For follow up questions, discharge recommendations, or new consults, please call answering service at [phone] (weekdays); [phone] (nights/weekends).

## 2024-07-05 NOTE — DIETITIAN INITIAL EVALUATION ADULT - NSFNSADHERENCEPTAFT_GEN_A_CORE
Pt stated he followed a regular diet prior to admission, no therapeutic restrictions. Pt reported poor appetite and poor PO intake x 6 months prior to admission. Per H&P, "Admits to poor PO intake of fluids/solute due to ongoing issues with poor appetite and nausea with meals which is not new."     Off note, per Endocrine, "Pt also with T2m based on prior A1c." Pt stated he does not take any medications or check fingersticks prior to admission.

## 2024-07-05 NOTE — DISCHARGE NOTE PROVIDER - DETAILS OF MALNUTRITION DIAGNOSIS/DIAGNOSES
This patient has been assessed with a concern for Malnutrition and was treated during this hospitalization for the following Nutrition diagnosis/diagnoses:     -  07/05/2024: Severe protein-calorie malnutrition

## 2024-07-05 NOTE — DIETITIAN INITIAL EVALUATION ADULT - ORAL INTAKE PTA/DIET HISTORY
Nutrition assessment completed at bedside. Pt reported poor appetite/PO intake x 6 months prior to admission, endorsed poor appetite since beginning treatment. Pt reported UBW of ~300-290 pounds x 1 year ago, stated current body weight is ~215 pounds at this time. Endorsed significant weight loss as a result of poor intake. Confirmed no known food allergies.

## 2024-07-05 NOTE — PROGRESS NOTE ADULT - SUBJECTIVE AND OBJECTIVE BOX
Patrizia Weber MD, PGY-4  Endocrinology fellow  Available on [employer] Teams    Interval Events: No acute overnight events. Pt seen and examined. Tolerating PO, no nausea/vomiting. No hypoglycemia symptoms. Denies fevers, chills, CP, SOB, Abdominal pain, constipation, Diarrhea.      MEDICATIONS  (STANDING):  allopurinol 100 milliGRAM(s) Oral two times a day  apixaban 5 milliGRAM(s) Oral every 12 hours  ascorbic acid 500 milliGRAM(s) Oral daily  atorvastatin 80 milliGRAM(s) Oral at bedtime  clopidogrel Tablet 75 milliGRAM(s) Oral daily  colchicine 0.6 milliGRAM(s) Oral daily  folic acid 1 milliGRAM(s) Oral daily  lactated ringers. 1000 milliLiter(s) (75 mL/Hr) IV Continuous <Continuous>  midodrine. 20 milliGRAM(s) Oral three times a day  multivitamin 1 Tablet(s) Oral daily  sodium chloride 0.9%. 1000 milliLiter(s) (50 mL/Hr) IV Continuous <Continuous>  sotalol. 40 milliGRAM(s) Oral every 24 hours  thiamine 100 milliGRAM(s) Oral daily    MEDICATIONS  (PRN):  acetaminophen     Tablet .. 650 milliGRAM(s) Oral every 6 hours PRN Temp greater or equal to 38C (100.4F), Mild Pain (1 - 3)  ondansetron Injectable 4 milliGRAM(s) IV Push every 8 hours PRN Nausea and/or Vomiting      Allergies    No Known Allergies    Intolerances          ================PHYSICAL EXAM======================  VITALS: T(C): 36.4 (07-05-24 @ 13:04)  T(F): 97.6 (07-05-24 @ 13:04), Max: 98 (07-04-24 @ 16:26)  HR: 81 (07-05-24 @ 13:04) (72 - 90)  BP: 112/70 (07-05-24 @ 13:04) (101/61 - 116/69)  RR:  (18 - 18)  SpO2:  (95% - 98%)  Wt(kg): --  GENERAL: NAD, appears comfortable  EYES: No proptosis, no lid lag, anicteric  HEENT:  Atraumatic, Normocephalic, moist mucous membranes  RESPIRATORY: nonlabored respirations, no wheezing  PSYCH: Alert and awake  ===================================================    CAPILLARY BLOOD GLUCOSE          07-05    140  |  106  |  22  ----------------------------<  106<H>  3.8   |  17<L>  |  0.99    eGFR: 78    Ca    9.4      07-05  Mg     1.8     07-03  Phos  3.0     07-03        A1C with Estimated Average Glucose Result: 6.5 % (11-17-23 @ 06:38)  A1C with Estimated Average Glucose Result: 6.7 % (10-25-23 @ 06:57)      Thyroid Function Tests:  07-04 @ 12:31 TSH 2.47 FreeT4 1.6 T3 -- Anti TPO -- Anti Thyroglobulin Ab -- TSI --                       Patrizia Weber MD, PGY-4  Endocrinology fellow  Available on [employer] Teams    Interval Events: Midodrine increased to 20mg tid this morning with improvement in orthostatics.   Subjective: Pt seen and examined. Reports poor appetite, diarrhea (though states this was his first BM since last week). No nausea/vomiting. Denies flushing, SOB, abdominal pain.      MEDICATIONS  (STANDING):  allopurinol 100 milliGRAM(s) Oral two times a day  apixaban 5 milliGRAM(s) Oral every 12 hours  ascorbic acid 500 milliGRAM(s) Oral daily  atorvastatin 80 milliGRAM(s) Oral at bedtime  clopidogrel Tablet 75 milliGRAM(s) Oral daily  colchicine 0.6 milliGRAM(s) Oral daily  folic acid 1 milliGRAM(s) Oral daily  lactated ringers. 1000 milliLiter(s) (75 mL/Hr) IV Continuous <Continuous>  midodrine. 20 milliGRAM(s) Oral three times a day  multivitamin 1 Tablet(s) Oral daily  sodium chloride 0.9%. 1000 milliLiter(s) (50 mL/Hr) IV Continuous <Continuous>  sotalol. 40 milliGRAM(s) Oral every 24 hours  thiamine 100 milliGRAM(s) Oral daily    MEDICATIONS  (PRN):  acetaminophen     Tablet .. 650 milliGRAM(s) Oral every 6 hours PRN Temp greater or equal to 38C (100.4F), Mild Pain (1 - 3)  ondansetron Injectable 4 milliGRAM(s) IV Push every 8 hours PRN Nausea and/or Vomiting      Allergies    No Known Allergies    Intolerances          ================PHYSICAL EXAM======================  VITALS: T(C): 36.4 (07-05-24 @ 13:04)  T(F): 97.6 (07-05-24 @ 13:04), Max: 98 (07-04-24 @ 16:26)  HR: 81 (07-05-24 @ 13:04) (72 - 90)  BP: 112/70 (07-05-24 @ 13:04) (101/61 - 116/69)  RR:  (18 - 18)  SpO2:  (95% - 98%)  Wt(kg): --  GENERAL: NAD, appears comfortable  EYES: No proptosis, no lid lag, anicteric  HEENT:  Atraumatic, Normocephalic, moist mucous membranes  NECK: No palpable thyroid nodules, normal thyroid  CARDIAC: RRR, no MRG, S1/S2  RESPIRATORY: nonlabored respirations, no wheezing  EXTREMITIES: 2+ peripheral pulses, no edema   PSYCH: Alert and awake  ===================================================    CAPILLARY BLOOD GLUCOSE          07-05    140  |  106  |  22  ----------------------------<  106<H>  3.8   |  17<L>  |  0.99    eGFR: 78    Ca    9.4      07-05  Mg     1.8     07-03  Phos  3.0     07-03        A1C with Estimated Average Glucose Result: 6.5 % (11-17-23 @ 06:38)  A1C with Estimated Average Glucose Result: 6.7 % (10-25-23 @ 06:57)      Thyroid Function Tests:  07-04 @ 12:31 TSH 2.47 FreeT4 1.6 T3 -- Anti TPO -- Anti Thyroglobulin Ab -- TSI --

## 2024-07-06 NOTE — PROGRESS NOTE ADULT - ASSESSMENT
79 yo male with PMH of CAD s/p PCI x3 with most recent stent 6/12/24 on Plavix, chronic Afib on eliquis, orthostatic hypotension on midodrine, PAD, 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 Patient states since discharge on this past Saturday, he continued to feel ongoing generalized weakness and dizziness with positional changes despite compliance with home midodrine 15mg TID and holding torsemide as instructed as of day PTA . Admits to poor PO intake of fluids/solute due to ongoing issues with poor appetite and nausea with meals which is not new. In the ED, vitals notable for SBP 92-11s. Labs notable for elevated BUN/Cr 31/1.59 (from 30/1.18 on day of discharge 6/29/24). CXR with clear lungs. Abd US with innumerable intrahepatic echogenic masses consistent with known metastatic neuroendocrine tumor. Admitted to medicine for symptomatic hypotension and RAZIA.      1- RAZIA  resolved   2- orthostatic hypotension   3- hx chf   4- pericarditis   5- diarrhea     cr abn in setting of hypotension ATN however overall cr is improving   off  SGLT 2 inh as well as diuretics for now   acidosis given pt on jardiance recently to have beta hydroxy level and lactic acid   can sotalol be changed to aother agent ?   monitor for diarrhea given also on colchicine   midodrine -20 mg tid   will add northera 100mg tid   acidosis trend bicarb  IVF hydration given diarrhea   ? hold colcrys given diarrhea due to Neuro endocrine or colcrys   endo work up in progress  strict I/O  orthostatics daily

## 2024-07-06 NOTE — PROGRESS NOTE ADULT - PROBLEM SELECTOR PLAN 6
known metastatic pancreatic neuroendocrine tumor  - follows with Dr. Sophia Prasad at [facility] [place]  - radiation therapy currently on hold given recent cardiac issues awaiting cards clearance to resume  - outpatient f/u with Dr. Prasad upon discharge

## 2024-07-06 NOTE — PROGRESS NOTE ADULT - ASSESSMENT
77 yo male with PMH of CAD s/p PCI x3 with most recent stent 6/12/24 on Plavix, chronic Afib on eliquis, orthostatic hypotension on midodrine, PAD, 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    1. Pancreatic NET- metastatic   -- was on lanreotide then had POD in liver and started on peptide receptor radiotherapy (PRRT)- typically given every 8 weeks for 4 doses. He received one dose on 10/20/2023 and planned to get his 2nd dose at the end of December however his course was complicated by multiple hospitalizations that were noncancer related (decompensated heart failure).   -- 5/28/24 PET Dotatate (compared to 9/2023): several new somatostatin avid osseous lesions, some faintly sclerotic, suspicious for mets. Increased upper RP nodes, probably metastatic. Decreased intensely tracer avid right supradiaphragmatic and upper abdominal nodes, suspicious for metastasis. Redemonstrated intensely somatostatin avid bilobar hepatic lesions, consistent with metastases again morphologically difficult to delineate.   -- f/u with Dr. Sophia Prasad at [facility] after discharge, no plan for treatment inpatient     2. Hypotension   - Monitoring orthostatics  - Follow up cardiology recommendations  - Per endocrine, adrenal insufficiency ruled out given AM cortisol of 17, continues midodrine    Hugo Topete PA-C  Hematology/Oncology  [facility]  [phone] (office)

## 2024-07-06 NOTE — PROGRESS NOTE ADULT - PROBLEM SELECTOR PLAN 2
- superimposed on CKD2.   - improved s/p IV hydration ; Cr 0.99 stable   - continue to hold home Torsemide and Jardiance  - US abd negative for hydro  - renally dose meds to GFR, avoid nephrotoxic agents

## 2024-07-06 NOTE — PROGRESS NOTE ADULT - SUBJECTIVE AND OBJECTIVE BOX
[facility]   [phone]  RENAL FOLLOW UP NOTE  --------------------------------------------------------------------------------  Chief Complaint:    24 hour events/subjective:    states has watery diarrhea       PAST HISTORY  --------------------------------------------------------------------------------  No significant changes to PMH, PSH, FHx, SHx, unless otherwise noted    ALLERGIES & MEDICATIONS  --------------------------------------------------------------------------------  Allergies    No Known Allergies    Intolerances      Standing Inpatient Medications  allopurinol 100 milliGRAM(s) Oral two times a day  apixaban 5 milliGRAM(s) Oral every 12 hours  ascorbic acid 500 milliGRAM(s) Oral daily  atorvastatin 80 milliGRAM(s) Oral at bedtime  clopidogrel Tablet 75 milliGRAM(s) Oral daily  colchicine 0.6 milliGRAM(s) Oral daily  folic acid 1 milliGRAM(s) Oral daily  lactated ringers. 1000 milliLiter(s) IV Continuous <Continuous>  midodrine. 20 milliGRAM(s) Oral three times a day  multivitamin 1 Tablet(s) Oral daily  sodium chloride 0.9%. 1000 milliLiter(s) IV Continuous <Continuous>  sotalol. 40 milliGRAM(s) Oral every 24 hours  thiamine 100 milliGRAM(s) Oral daily    PRN Inpatient Medications  acetaminophen     Tablet .. 650 milliGRAM(s) Oral every 6 hours PRN  ondansetron Injectable 4 milliGRAM(s) IV Push every 8 hours PRN      REVIEW OF SYSTEMS  --------------------------------------------------------------------------------    Gen: denies  fevers/chills,  CVS: denies chest pain/palpitations  Resp: denies SOB/Cough  GI: Denies N/V/Abd pain  : Denies dysuria/oliguria/hematuria    All other systems were reviewed and are negative, except as noted.    VITALS/PHYSICAL EXAM  --------------------------------------------------------------------------------  T(C): 36.4 (07-06-24 @ 11:44), Max: 36.9 (07-05-24 @ 19:48)  HR: 79 (07-06-24 @ 11:44) (68 - 91)  BP: 110/56 (07-06-24 @ 11:44) (100/61 - 119/76)  RR: 18 (07-06-24 @ 11:44) (18 - 18)  SpO2: 96% (07-06-24 @ 11:44) (95% - 98%)  Wt(kg): --        07-05-24 @ 07:01  -  07-06-24 @ 07:00  --------------------------------------------------------  IN: 1885 mL / OUT: 800 mL / NET: 1085 mL    07-06-24 @ 07:01  -  07-06-24 @ 14:30  --------------------------------------------------------  IN: 480 mL / OUT: 0 mL / NET: 480 mL      Physical Exam:  	      	Gen: Non toxic comfortable appearing   	no jvd  	Pulm: decrease bs  no rales or ronchi or wheezing  	CV: RRR, S1S2; no rub  	Abd: +BS, soft, nontender/nondistended  	: No suprapubic tenderness  	UE: Warm, no cyanosis  no clubbing,  no edema  	LE: Warm, no cyanosis  no clubbing, no edema  	Neuro: alert and oriented. speech coherent       LABS/STUDIES  --------------------------------------------------------------------------------              11.1   9.13  >-----------<  330      [07-05-24 @ 07:17]              34.1     140  |  106  |  22  ----------------------------<  106      [07-05-24 @ 07:17]  3.8   |  17  |  0.99        Ca     9.4     [07-05-24 @ 07:17]            Creatinine Trend:  SCr 0.99 [07-05 @ 07:17]  SCr 1.08 [07-04 @ 10:37]  SCr 1.38 [07-03 @ 06:51]  SCr 1.59 [07-02 @ 14:12]  SCr 1.18 [06-29 @ 07:03]          Urine Creatinine 108      [07-02-24 @ 20:10]  Urine Sodium 17      [07-02-24 @ 20:10]  Urine Urea Nitrogen 730      [07-02-24 @ 20:10]    TSH 2.47      [07-04-24 @ 12:31]

## 2024-07-06 NOTE — PROGRESS NOTE ADULT - PROBLEM SELECTOR PLAN 1
reported with SBP 80s in PCP office despite taking midodrine dose in AM. has had ongoing dizziness with positional changes and poor PO intake of fluids/solute due to ongoing lack of appetite  - orthostatics +ve  - continue to hold home torsemide   -  home midodrine increased from 15mg TID to 20mg TID  -  f/up Cardiology and Nephrology reccs   - Orthostatics improving c/w IVF today, recheck [place] tomorrow, will send Northera 100 mg TID to [facility] for price check. I d/w Cardiology rec c/w sotalol for now

## 2024-07-06 NOTE — PROGRESS NOTE ADULT - SUBJECTIVE AND OBJECTIVE BOX
Patient is a 78y old  Male who presents with a chief complaint of hypotension (05 Jul 2024 21:15)    Patient seen and examined, feels well.   MEDICATIONS  (STANDING):  allopurinol 100 milliGRAM(s) Oral two times a day  apixaban 5 milliGRAM(s) Oral every 12 hours  ascorbic acid 500 milliGRAM(s) Oral daily  atorvastatin 80 milliGRAM(s) Oral at bedtime  clopidogrel Tablet 75 milliGRAM(s) Oral daily  colchicine 0.6 milliGRAM(s) Oral daily  folic acid 1 milliGRAM(s) Oral daily  lactated ringers. 1000 milliLiter(s) (75 mL/Hr) IV Continuous <Continuous>  midodrine. 20 milliGRAM(s) Oral three times a day  multivitamin 1 Tablet(s) Oral daily  sodium chloride 0.9%. 1000 milliLiter(s) (50 mL/Hr) IV Continuous <Continuous>  sotalol. 40 milliGRAM(s) Oral every 24 hours  thiamine 100 milliGRAM(s) Oral daily    MEDICATIONS  (PRN):  acetaminophen     Tablet .. 650 milliGRAM(s) Oral every 6 hours PRN Temp greater or equal to 38C (100.4F), Mild Pain (1 - 3)  ondansetron Injectable 4 milliGRAM(s) IV Push every 8 hours PRN Nausea and/or Vomiting      ROS  No fever, sweats, chills  No epistaxis, HA, sore throat  No CP, SOB, cough, sputum  No n/v/d, abd pain, melena, hematochezia  No edema  No rash  No anxiety  No back pain, joint pain  No bleeding, bruising  No dysuria, hematuria    Vital Signs Last 24 Hrs  T(C): 36.5 (06 Jul 2024 08:18), Max: 36.9 (05 Jul 2024 19:48)  T(F): 97.7 (06 Jul 2024 08:18), Max: 98.4 (05 Jul 2024 19:48)  HR: 83 (06 Jul 2024 04:35) (68 - 91)  BP: 100/61 (06 Jul 2024 04:35) (100/61 - 119/76)  BP(mean): --  RR: 18 (06 Jul 2024 08:18) (18 - 18)  SpO2: 95% (06 Jul 2024 08:18) (95% - 98%)    Parameters below as of 06 Jul 2024 08:18  Patient On (Oxygen Delivery Method): room air        PE  NAD  Awake, alert  Anicteric, MMM  RRR  CTAB  Abd soft, NT, ND  No c/c/e  No rash grossly  FROM                          11.1   9.13  )-----------( 330      ( 05 Jul 2024 07:17 )             34.1       07-05    140  |  106  |  22  ----------------------------<  106<H>  3.8   |  17<L>  |  0.99    Ca    9.4      05 Jul 2024 07:17

## 2024-07-06 NOTE — PROGRESS NOTE ADULT - SUBJECTIVE AND OBJECTIVE BOX
DATE OF SERVICE: 07-06-24 @ 13:00    Patient is a 78y old  Male who presents with a chief complaint of hypotension (06 Jul 2024 12:18)      INTERVAL HISTORY: no complaints     REVIEW OF SYSTEMS:  CONSTITUTIONAL: No weakness  EYES/ENT: No visual changes;  No throat pain   NECK: No pain or stiffness  RESPIRATORY: No cough, wheezing; No shortness of breath  CARDIOVASCULAR: No chest pain or palpitations  GASTROINTESTINAL: No abdominal  pain. No nausea, vomiting, or hematemesis  GENITOURINARY: No dysuria, frequency or hematuria  NEUROLOGICAL: No stroke like symptoms  SKIN: No rashes        MEDICATIONS:  midodrine. 20 milliGRAM(s) Oral three times a day  sotalol. 40 milliGRAM(s) Oral every 24 hours        PHYSICAL EXAM:  T(C): 36.4 (07-06-24 @ 11:44), Max: 36.9 (07-05-24 @ 19:48)  HR: 79 (07-06-24 @ 11:44) (68 - 91)  BP: 110/56 (07-06-24 @ 11:44) (100/61 - 119/76)  RR: 18 (07-06-24 @ 11:44) (18 - 18)  SpO2: 96% (07-06-24 @ 11:44) (95% - 98%)  Wt(kg): --  I&O's Summary    05 Jul 2024 07:01  -  06 Jul 2024 07:00  --------------------------------------------------------  IN: 1885 mL / OUT: 800 mL / NET: 1085 mL    06 Jul 2024 07:01  -  06 Jul 2024 13:00  --------------------------------------------------------  IN: 240 mL / OUT: 0 mL / NET: 240 mL          Appearance: In no distress	  HEENT:    PERRL, EOMI	  Cardiovascular:  S1 S2, No JVD  Respiratory: Lungs clear to auscultation	  Gastrointestinal:  Soft, Non-tender, + BS	  Vascularature:  No edema of LE  Psychiatric: Appropriate affect   Neuro: no acute focal deficits                               11.1   9.13  )-----------( 330      ( 05 Jul 2024 07:17 )             34.1     07-05    140  |  106  |  22  ----------------------------<  106<H>  3.8   |  17<L>  |  0.99    Ca    9.4      05 Jul 2024 07:17          Labs personally reviewed      ASSESSMENT/PLAN: 	      78-year-old male multiple medical problems history of hepatocellular carcinoma status post radiation therapy, AF s/p DCCV on Eliquis who was found to be hypotensive following NST. Patient has reported general weakness, fatigue, lightheadedness since being discharged from hospital. Reports mild chest discomfort in midsternal region. Reports dyspnea with minimal exertion. Also reports significant lack of appetite and poor PO intake. No dark or bloody stool, nausea or vomiting.       Problem/Plan - 1:  ·  Problem: Hypotension  ·  Plan: Continue to hold home torsemide and Jardiance.   - Patient presents with hypotension and also RAZIA. Has known orthostatic hypotension.   - Patient and wife report decreased PO intake likely 2/2 malignancy.  - Appreciate PT recs  - Orthos still positive: s/p fluids with improvement in degree of orthostasis  - Increase Midodrine to 20mg PO TID   - Appreciate Endocrine recommendations to r/o adrenal insufficiency     Problem/Plan - 2:  ·  Problem: CAD.   ·  Plan: Recent PCI to pLAD in June 2023  - ECG non-ischemic  - c/w Plavix and statin     Problem/Plan - 3:  ·  Problem: Atrial Fibrillation  - s/p succesful DCCV 10/31  - c/w Eliquis 5mg BID   - C/w of Sotalol 40mg PO daily (qTC wnl)    Problem/Plan - 4:  ·  Problem: Pericarditis.   ·  Plan: Chest pain now improved since last admission.  - c/w colchicine 0.6mg PO daily      Plan for discharge when asymptomatic from hypotension with OP follow up with Dr. Yoseph Valentin, PACynthiaC  Hank Hayes DO [facility]  Cardiovascular Medicine  [address], [address]  Office: [phone]  Available via call/text on [employer] Teams  DATE OF SERVICE: 07-06-24 @ 13:00    Patient is a 78y old  Male who presents with a chief complaint of hypotension (06 Jul 2024 12:18)      INTERVAL HISTORY: no complaints     REVIEW OF SYSTEMS:  CONSTITUTIONAL: No weakness  EYES/ENT: No visual changes;  No throat pain   NECK: No pain or stiffness  RESPIRATORY: No cough, wheezing; No shortness of breath  CARDIOVASCULAR: No chest pain or palpitations  GASTROINTESTINAL: No abdominal  pain. No nausea, vomiting, or hematemesis  GENITOURINARY: No dysuria, frequency or hematuria  NEUROLOGICAL: No stroke like symptoms  SKIN: No rashes        MEDICATIONS:  midodrine. 20 milliGRAM(s) Oral three times a day  sotalol. 40 milliGRAM(s) Oral every 24 hours        PHYSICAL EXAM:  T(C): 36.4 (07-06-24 @ 11:44), Max: 36.9 (07-05-24 @ 19:48)  HR: 79 (07-06-24 @ 11:44) (68 - 91)  BP: 110/56 (07-06-24 @ 11:44) (100/61 - 119/76)  RR: 18 (07-06-24 @ 11:44) (18 - 18)  SpO2: 96% (07-06-24 @ 11:44) (95% - 98%)  Wt(kg): --  I&O's Summary    05 Jul 2024 07:01  -  06 Jul 2024 07:00  --------------------------------------------------------  IN: 1885 mL / OUT: 800 mL / NET: 1085 mL    06 Jul 2024 07:01  -  06 Jul 2024 13:00  --------------------------------------------------------  IN: 240 mL / OUT: 0 mL / NET: 240 mL          Appearance: In no distress	  HEENT:    PERRL, EOMI	  Cardiovascular:  S1 S2, No JVD  Respiratory: Lungs clear to auscultation	  Gastrointestinal:  Soft, Non-tender, + BS	  Vascularature:  No edema of LE  Psychiatric: Appropriate affect   Neuro: no acute focal deficits                               11.1   9.13  )-----------( 330      ( 05 Jul 2024 07:17 )             34.1     07-05    140  |  106  |  22  ----------------------------<  106<H>  3.8   |  17<L>  |  0.99    Ca    9.4      05 Jul 2024 07:17          Labs personally reviewed      ASSESSMENT/PLAN: 	      78-year-old male multiple medical problems history of hepatocellular carcinoma status post radiation therapy, AF s/p DCCV on Eliquis who was found to be hypotensive following NST. Patient has reported general weakness, fatigue, lightheadedness since being discharged from hospital. Reports mild chest discomfort in midsternal region. Reports dyspnea with minimal exertion. Also reports significant lack of appetite and poor PO intake. No dark or bloody stool, nausea or vomiting.       Problem/Plan - 1:  ·  Problem: Hypotension  ·  Plan: Continue to hold home torsemide and Jardiance.   - Patient presents with hypotension and also RAZIA. Has known orthostatic hypotension.   - Patient and wife report decreased PO intake likely 2/2 malignancy.  - Appreciate PT recs  - Orthos still positive: s/p fluids with improvement in degree of orthostasis  - Increase Midodrine to 20mg PO TID   - Appreciate Endocrine recommendations to r/o adrenal insufficiency  - Plan to start Northera if insurance approves      Problem/Plan - 2:  ·  Problem: CAD.   ·  Plan: Recent PCI to pLAD in June 2023  - ECG non-ischemic  - c/w Plavix and statin     Problem/Plan - 3:  ·  Problem: Atrial Fibrillation  - s/p succesful DCCV 10/31  - c/w Eliquis 5mg BID   - C/w of Sotalol 40mg PO daily (qTC wnl)    Problem/Plan - 4:  ·  Problem: Pericarditis.   ·  Plan: Chest pain now improved since last admission.  - c/w colchicine 0.6mg PO daily      Plan for discharge when asymptomatic from hypotension with OP follow up with MARY Bonilla DO [facility]  Cardiovascular Medicine  [address] [place] [address], [address]  Office: [phone]  Available via call/text on [employer] Teams  DATE OF SERVICE: 07-06-24 @ 13:00    Patient is a 78y old  Male who presents with a chief complaint of hypotension (06 Jul 2024 12:18)      INTERVAL HISTORY: no complaints     REVIEW OF SYSTEMS:  CONSTITUTIONAL: No weakness  EYES/ENT: No visual changes;  No throat pain   NECK: No pain or stiffness  RESPIRATORY: No cough, wheezing; No shortness of breath  CARDIOVASCULAR: No chest pain or palpitations  GASTROINTESTINAL: No abdominal  pain. No nausea, vomiting, or hematemesis  GENITOURINARY: No dysuria, frequency or hematuria  NEUROLOGICAL: No stroke like symptoms  SKIN: No rashes        MEDICATIONS:  midodrine. 20 milliGRAM(s) Oral three times a day  sotalol. 40 milliGRAM(s) Oral every 24 hours        PHYSICAL EXAM:  T(C): 36.4 (07-06-24 @ 11:44), Max: 36.9 (07-05-24 @ 19:48)  HR: 79 (07-06-24 @ 11:44) (68 - 91)  BP: 110/56 (07-06-24 @ 11:44) (100/61 - 119/76)  RR: 18 (07-06-24 @ 11:44) (18 - 18)  SpO2: 96% (07-06-24 @ 11:44) (95% - 98%)  Wt(kg): --  I&O's Summary    05 Jul 2024 07:01  -  06 Jul 2024 07:00  --------------------------------------------------------  IN: 1885 mL / OUT: 800 mL / NET: 1085 mL    06 Jul 2024 07:01  -  06 Jul 2024 13:00  --------------------------------------------------------  IN: 240 mL / OUT: 0 mL / NET: 240 mL          Appearance: In no distress	  HEENT:    PERRL, EOMI	  Cardiovascular:  S1 S2, No JVD  Respiratory: Lungs clear to auscultation	  Gastrointestinal:  Soft, Non-tender, + BS	  Vascularature:  No edema of LE  Psychiatric: Appropriate affect   Neuro: no acute focal deficits                               11.1   9.13  )-----------( 330      ( 05 Jul 2024 07:17 )             34.1     07-05    140  |  106  |  22  ----------------------------<  106<H>  3.8   |  17<L>  |  0.99    Ca    9.4      05 Jul 2024 07:17          Labs personally reviewed      ASSESSMENT/PLAN: 	      78-year-old male multiple medical problems history of hepatocellular carcinoma status post radiation therapy, AF s/p DCCV on Eliquis who was found to be hypotensive following NST. Patient has reported general weakness, fatigue, lightheadedness since being discharged from hospital. Reports mild chest discomfort in midsternal region. Reports dyspnea with minimal exertion. Also reports significant lack of appetite and poor PO intake. No dark or bloody stool, nausea or vomiting.       Problem/Plan - 1:  ·  Problem: Hypotension  ·  Plan: Continue to hold home torsemide and Jardiance.   - Patient presents with hypotension and also RAZIA. Has known orthostatic hypotension.   - Patient and wife report decreased PO intake likely 2/2 malignancy.  - Appreciate PT recs  - Orthos still positive: s/p fluids with improvement in degree of orthostasis  - Increase Midodrine to 20mg PO TID   - Appreciate Endocrine recommendations to r/o adrenal insufficiency  - Plan to start Northera if insurance approves   - 7/6 pt states he walked to the bathroom with no dizziness or lightheadedness  - c/w IVF given diarrhea with minimal PO intake     Problem/Plan - 2:  ·  Problem: CAD.   ·  Plan: Recent PCI to pLAD in June 2023  - ECG non-ischemic  - c/w Plavix and statin     Problem/Plan - 3:  ·  Problem: Atrial Fibrillation  - s/p succesful DCCV 10/31  - c/w Eliquis 5mg BID   - C/w of Sotalol 40mg PO daily (qTC wnl)    Problem/Plan - 4:  ·  Problem: Pericarditis.   ·  Plan: Chest pain now improved since last admission.  - c/w colchicine 0.6mg PO daily      Plan for discharge when asymptomatic from hypotension with OP follow up with Dr. MARY Boyer DO [facility]  Cardiovascular Medicine  [address] [place] [address], [address]  Office: [phone]  Available via call/text on [employer] Teams

## 2024-07-07 NOTE — PROGRESS NOTE ADULT - NS ATTEND AMEND GEN_ALL_CORE FT
pt reports diarrhea for 3 days however primary team relates he has had it for months?. will work up infecitious etiology . if no infection found consider octreotide . obtain chromogranin level

## 2024-07-07 NOTE — PROGRESS NOTE ADULT - SUBJECTIVE AND OBJECTIVE BOX
[facility]   [phone]  RENAL FOLLOW UP NOTE  --------------------------------------------------------------------------------  Chief Complaint:    24 hour events/subjective:    seen earlier   c/o nausea   and dizziness today   has diarrhea liquid    PAST HISTORY  --------------------------------------------------------------------------------  No significant changes to PMH, PSH, FHx, SHx, unless otherwise noted    ALLERGIES & MEDICATIONS  --------------------------------------------------------------------------------  Allergies    No Known Allergies    Intolerances      Standing Inpatient Medications  allopurinol 100 milliGRAM(s) Oral two times a day  apixaban 5 milliGRAM(s) Oral every 12 hours  ascorbic acid 500 milliGRAM(s) Oral daily  atorvastatin 80 milliGRAM(s) Oral at bedtime  clopidogrel Tablet 75 milliGRAM(s) Oral daily  folic acid 1 milliGRAM(s) Oral daily  lactated ringers. 1000 milliLiter(s) IV Continuous <Continuous>  midodrine. 20 milliGRAM(s) Oral three times a day  multivitamin 1 Tablet(s) Oral daily  sotalol. 40 milliGRAM(s) Oral every 24 hours  thiamine 100 milliGRAM(s) Oral daily    PRN Inpatient Medications  acetaminophen     Tablet .. 650 milliGRAM(s) Oral every 6 hours PRN  ondansetron Injectable 4 milliGRAM(s) IV Push every 8 hours PRN      REVIEW OF SYSTEMS  --------------------------------------------------------------------------------    Gen: denies chills,  CVS: denies chest pain/palpitations  Resp: denies SOB/Cough  GI: Denies N/V/Abd pain  : Denies dysuria    VITALS/PHYSICAL EXAM  --------------------------------------------------------------------------------  T(C): 36.6 (07-07-24 @ 16:42), Max: 36.7 (07-07-24 @ 08:31)  HR: 84 (07-07-24 @ 16:42) (78 - 93)  BP: 111/65 (07-07-24 @ 16:42) (102/62 - 120/65)  RR: 18 (07-07-24 @ 16:42) (18 - 19)  SpO2: 97% (07-07-24 @ 16:42) (96% - 98%)  Wt(kg): --        07-06-24 @ 07:01  -  07-07-24 @ 07:00  --------------------------------------------------------  IN: 880 mL / OUT: 500 mL / NET: 380 mL    07-07-24 @ 07:01  -  07-07-24 @ 18:48  --------------------------------------------------------  IN: 980 mL / OUT: 300 mL / NET: 680 mL      Physical Exam:  	    	Gen: Non toxic comfortable appearing   	no jvd  	Pulm: decrease bs  no rales or ronchi or wheezing  	CV: RRR, S1S2; no rub  	Abd: +BS, soft, nontender/nondistended  	: No suprapubic tenderness  	UE: Warm, no cyanosis  no clubbing,  no edema  	LE: Warm, no cyanosis  no clubbing, no edema  	Neuro: alert and oriented. speech coherent       LABS/STUDIES  --------------------------------------------------------------------------------    139  |  108  |  32  ----------------------------<  124      [07-07-24 @ 07:08]  3.6   |  14  |  1.41        Ca     9.8     [07-07-24 @ 07:08]      Mg     1.8     [07-07-24 @ 07:08]      Phos  4.0     [07-07-24 @ 07:08]            Creatinine Trend:  SCr 1.41 [07-07 @ 07:08]  SCr 0.99 [07-05 @ 07:17]  SCr 1.08 [07-04 @ 10:37]  SCr 1.38 [07-03 @ 06:51]  SCr 1.59 [07-02 @ 14:12]      Urine Creatinine 108      [07-02-24 @ 20:10]  Urine Sodium 17      [07-02-24 @ 20:10]  Urine Urea Nitrogen 730      [07-02-24 @ 20:10]    TSH 2.47      [07-04-24 @ 12:31]

## 2024-07-07 NOTE — PROGRESS NOTE ADULT - SUBJECTIVE AND OBJECTIVE BOX
DATE OF SERVICE: 07-07-24 @ 13:33    Patient is a 78y old  Male who presents with a chief complaint of hypotension (07 Jul 2024 12:27)      INTERVAL HISTORY: no complaints     REVIEW OF SYSTEMS:  CONSTITUTIONAL: No weakness  EYES/ENT: No visual changes;  No throat pain   NECK: No pain or stiffness  RESPIRATORY: No cough, wheezing; No shortness of breath  CARDIOVASCULAR: No chest pain or palpitations  GASTROINTESTINAL: No abdominal  pain. No nausea, vomiting, or hematemesis  GENITOURINARY: No dysuria, frequency or hematuria  NEUROLOGICAL: No stroke like symptoms  SKIN: No rashes      	  MEDICATIONS:  midodrine. 20 milliGRAM(s) Oral three times a day  sotalol. 40 milliGRAM(s) Oral every 24 hours        PHYSICAL EXAM:  T(C): 36.4 (07-07-24 @ 12:49), Max: 36.7 (07-07-24 @ 08:31)  HR: 78 (07-07-24 @ 12:49) (75 - 93)  BP: 120/65 (07-07-24 @ 12:49) (102/62 - 120/65)  RR: 18 (07-07-24 @ 12:49) (18 - 19)  SpO2: 98% (07-07-24 @ 12:49) (96% - 98%)  Wt(kg): --  I&O's Summary    06 Jul 2024 07:01  -  07 Jul 2024 07:00  --------------------------------------------------------  IN: 880 mL / OUT: 500 mL / NET: 380 mL    07 Jul 2024 07:01  -  07 Jul 2024 13:33  --------------------------------------------------------  IN: 240 mL / OUT: 0 mL / NET: 240 mL          Appearance: In no distress	  HEENT:    PERRL, EOMI	  Cardiovascular:  S1 S2, No JVD  Respiratory: Lungs clear to auscultation	  Gastrointestinal:  Soft, Non-tender, + BS	  Vascularature:  No edema of LE  Psychiatric: Appropriate affect   Neuro: no acute focal deficits           07-07    139  |  108  |  32<H>  ----------------------------<  124<H>  3.6   |  14<L>  |  1.41<H>    Ca    9.8      07 Jul 2024 07:08  Phos  4.0     07-07  Mg     1.8     07-07          Labs personally reviewed      ASSESSMENT/PLAN: 	      78-year-old male multiple medical problems history of hepatocellular carcinoma status post radiation therapy, AF s/p DCCV on Eliquis who was found to be hypotensive following NST. Patient has reported general weakness, fatigue, lightheadedness since being discharged from hospital. Reports mild chest discomfort in midsternal region. Reports dyspnea with minimal exertion. Also reports significant lack of appetite and poor PO intake. No dark or bloody stool, nausea or vomiting.       Problem/Plan - 1:  ·  Problem: Hypotension  ·  Plan: Continue to hold home torsemide and Jardiance.   - Patient presents with hypotension and also RAZIA. Has known orthostatic hypotension.   - Patient and wife report decreased PO intake likely 2/2 malignancy.  - Appreciate PT recs  - Orthos still positive: s/p fluids with improvement in degree of orthostasis  - Increase Midodrine to 20mg PO TID   - Appreciate Endocrine recommendations to r/o adrenal insufficiency  - Plan to start Northera if insurance approves   - 7/6 pt states he walked to the bathroom with no dizziness or lightheadedness  - c/w IVF given diarrhea with minimal PO intake     Problem/Plan - 2:  ·  Problem: CAD.   ·  Plan: Recent PCI to pLAD in June 2023  - ECG non-ischemic  - c/w Plavix and statin     Problem/Plan - 3:  ·  Problem: Atrial Fibrillation  - s/p succesful DCCV 10/31  - c/w Eliquis 5mg BID   - C/w of Sotalol 40mg PO daily (qTC wnl)    Problem/Plan - 4:  ·  Problem: Pericarditis.   ·  Plan: Chest pain now improved since last admission.  - c/w colchicine 0.6mg PO daily      Plan for discharge when asymptomatic from hypotension with OP follow up with MARY Bonilla DO [facility]  Cardiovascular Medicine  [address] [place] [address], [address]  Office: [phone]  Available via call/text on [employer] Teams  DATE OF SERVICE: 07-07-24 @ 13:33    Patient is a 78y old  Male who presents with a chief complaint of hypotension (07 Jul 2024 12:27)      INTERVAL HISTORY: no complaints     REVIEW OF SYSTEMS:  CONSTITUTIONAL: No weakness  EYES/ENT: No visual changes;  No throat pain   NECK: No pain or stiffness  RESPIRATORY: No cough, wheezing; No shortness of breath  CARDIOVASCULAR: No chest pain or palpitations  GASTROINTESTINAL: No abdominal  pain. No nausea, vomiting, or hematemesis  GENITOURINARY: No dysuria, frequency or hematuria  NEUROLOGICAL: No stroke like symptoms  SKIN: No rashes      	  MEDICATIONS:  midodrine. 20 milliGRAM(s) Oral three times a day  sotalol. 40 milliGRAM(s) Oral every 24 hours        PHYSICAL EXAM:  T(C): 36.4 (07-07-24 @ 12:49), Max: 36.7 (07-07-24 @ 08:31)  HR: 78 (07-07-24 @ 12:49) (75 - 93)  BP: 120/65 (07-07-24 @ 12:49) (102/62 - 120/65)  RR: 18 (07-07-24 @ 12:49) (18 - 19)  SpO2: 98% (07-07-24 @ 12:49) (96% - 98%)  Wt(kg): --  I&O's Summary    06 Jul 2024 07:01  -  07 Jul 2024 07:00  --------------------------------------------------------  IN: 880 mL / OUT: 500 mL / NET: 380 mL    07 Jul 2024 07:01  -  07 Jul 2024 13:33  --------------------------------------------------------  IN: 240 mL / OUT: 0 mL / NET: 240 mL          Appearance: In no distress	  HEENT:    PERRL, EOMI	  Cardiovascular:  S1 S2, No JVD  Respiratory: Lungs clear to auscultation	  Gastrointestinal:  Soft, Non-tender, + BS	  Vascularature:  No edema of LE  Psychiatric: Appropriate affect   Neuro: no acute focal deficits           07-07    139  |  108  |  32<H>  ----------------------------<  124<H>  3.6   |  14<L>  |  1.41<H>    Ca    9.8      07 Jul 2024 07:08  Phos  4.0     07-07  Mg     1.8     07-07          Labs personally reviewed      ASSESSMENT/PLAN: 	      78-year-old male multiple medical problems history of hepatocellular carcinoma status post radiation therapy, AF s/p DCCV on Eliquis who was found to be hypotensive following NST. Patient has reported general weakness, fatigue, lightheadedness since being discharged from hospital. Reports mild chest discomfort in midsternal region. Reports dyspnea with minimal exertion. Also reports significant lack of appetite and poor PO intake. No dark or bloody stool, nausea or vomiting.       Problem/Plan - 1:  ·  Problem: Hypotension  ·  Plan: Continue to hold home torsemide and Jardiance.   - Patient presents with hypotension and also RAZIA. Has known orthostatic hypotension.   - Patient and wife report decreased PO intake likely 2/2 malignancy.  - Appreciate PT recs  - Orthos still positive: s/p fluids with improvement in degree of orthostasis  - Increase Midodrine to 20mg PO TID   - Appreciate Endocrine recommendations to r/o adrenal insufficiency  - Plan to start Northera if insurance approves   - 7/6 pt states he walked to the bathroom with no dizziness or lightheadedness  - c/w IVF given diarrhea with minimal PO intake     Problem/Plan - 2:  ·  Problem: CAD.   ·  Plan: Recent PCI to pLAD in June 2023  - ECG non-ischemic  - c/w Plavix and statin     Problem/Plan - 3:  ·  Problem: Atrial Fibrillation  - s/p succesful DCCV 10/31  - c/w Eliquis 5mg BID   - C/w of Sotalol 40mg PO daily (qTC wnl)    Problem/Plan - 4:  ·  Problem: H/O Pericarditis.   ·  Plan: Chest pain now improved since last admission.  -  d/c colchicine 0.6mg PO daily      Plan for discharge when asymptomatic from hypotension with OP follow up with MARY Bonilla, DO [facility]  Cardiovascular Medicine  [address] [facility], [address]  Office: [phone]  Available via call/text on [employer] Teams

## 2024-07-07 NOTE — PROGRESS NOTE ADULT - PROBLEM SELECTOR PLAN 4
- superimposed on CKD2.   - improved s/p IV hydration ; Cr 1.44 (nephrology aware)  - continue to hold home Torsemide and Jardiance  - US abd negative for hydro  - renally dose meds to GFR, avoid nephrotoxic agents

## 2024-07-07 NOTE — PROGRESS NOTE ADULT - SUBJECTIVE AND OBJECTIVE BOX
Patient is a 78y old  Male who presents with a chief complaint of hypotension (07 Jul 2024 11:14)      SUBJECTIVE / OVERNIGHT EVENTS: Patient continues to be orthostatic, Stats he is having watery diarrhea has been persistent for months per patient. He had an episode of emesis this morning associated with orthostatic hypotension     ROS:  All other review of systems negative    Allergies    No Known Allergies    Intolerances        MEDICATIONS  (STANDING):  allopurinol 100 milliGRAM(s) Oral two times a day  apixaban 5 milliGRAM(s) Oral every 12 hours  ascorbic acid 500 milliGRAM(s) Oral daily  atorvastatin 80 milliGRAM(s) Oral at bedtime  clopidogrel Tablet 75 milliGRAM(s) Oral daily  folic acid 1 milliGRAM(s) Oral daily  lactated ringers. 1000 milliLiter(s) (100 mL/Hr) IV Continuous <Continuous>  midodrine. 20 milliGRAM(s) Oral three times a day  multivitamin 1 Tablet(s) Oral daily  sotalol. 40 milliGRAM(s) Oral every 24 hours  thiamine 100 milliGRAM(s) Oral daily    MEDICATIONS  (PRN):  acetaminophen     Tablet .. 650 milliGRAM(s) Oral every 6 hours PRN Temp greater or equal to 38C (100.4F), Mild Pain (1 - 3)  ondansetron Injectable 4 milliGRAM(s) IV Push every 8 hours PRN Nausea and/or Vomiting      Vital Signs Last 24 Hrs  T(C): 36.7 (07 Jul 2024 08:31), Max: 36.7 (07 Jul 2024 08:31)  T(F): 98 (07 Jul 2024 08:31), Max: 98 (07 Jul 2024 08:31)  HR: 85 (07 Jul 2024 08:31) (75 - 93)  BP: 114/66 (07 Jul 2024 08:31) (102/62 - 114/66)  BP(mean): --  RR: 18 (07 Jul 2024 08:31) (18 - 19)  SpO2: 96% (07 Jul 2024 08:31) (96% - 98%)    Parameters below as of 07 Jul 2024 08:31  Patient On (Oxygen Delivery Method): room air      CAPILLARY BLOOD GLUCOSE        I&O's Summary    06 Jul 2024 07:01  -  07 Jul 2024 07:00  --------------------------------------------------------  IN: 880 mL / OUT: 500 mL / NET: 380 mL    07 Jul 2024 07:01  -  07 Jul 2024 12:27  --------------------------------------------------------  IN: 240 mL / OUT: 0 mL / NET: 240 mL        PHYSICAL EXAM:  GENERAL: NAD, well-developed, dry mucus membranes   HEAD:  Atraumatic, Normocephalic  EYES: EOMI, PERRLA, conjunctiva and sclera clear  NECK: Supple, No JVD  CHEST/LUNG: Clear to auscultation bilaterally; No wheeze  HEART: Regular rate and rhythm; No murmurs, rubs, or gallops  ABDOMEN: Soft, Nontender, Nondistended; Bowel sounds present  EXTREMITIES:  2+ Peripheral Pulses, No clubbing, cyanosis, or edema  NEUROLOGY: AAOx3, non-focal      LABS:    07-07    139  |  108  |  32<H>  ----------------------------<  124<H>  3.6   |  14<L>  |  1.41<H>    Ca    9.8      07 Jul 2024 07:08  Phos  4.0     07-07  Mg     1.8     07-07            Urinalysis Basic - ( 07 Jul 2024 07:08 )    Color: x / Appearance: x / SG: x / pH: x  Gluc: 124 mg/dL / Ketone: x  / Bili: x / Urobili: x   Blood: x / Protein: x / Nitrite: x   Leuk Esterase: x / RBC: x / WBC x   Sq Epi: x / Non Sq Epi: x / Bacteria: x        RADIOLOGY & ADDITIONAL TESTS:    Care Discussed with Consultants/Other Providers: Medicine ACP, Nephrology ,Cardiology and hematology

## 2024-07-07 NOTE — PROGRESS NOTE ADULT - PROBLEM SELECTOR PLAN 1
reported with SBP 80s in PCP office despite taking midodrine dose in AM. has had ongoing dizziness with positional changes and poor PO intake of fluids/solute due to ongoing lack of appetite  - orthostatics +ve  - continue to hold home torsemide   -  home midodrine increased from 15mg TID to 20mg TID  -  f/up Cardiology and Nephrology reccs   - Orthostatics c/e IVF  - Northera 100 mg TID (not covered by insurance cost 39.47$, however given the Diarrhea is the likely cause of OH, will give IVF and treat the underlying cause, hold off on Northera)

## 2024-07-07 NOTE — PROGRESS NOTE ADULT - SUBJECTIVE AND OBJECTIVE BOX
Patient is a 78y old  Male who presents with a chief complaint of hypotension (06 Jul 2024 14:29)    Patient seen and examined, resting in bed. Reports diarrhea for the last 3 days.   MEDICATIONS  (STANDING):  allopurinol 100 milliGRAM(s) Oral two times a day  apixaban 5 milliGRAM(s) Oral every 12 hours  ascorbic acid 500 milliGRAM(s) Oral daily  atorvastatin 80 milliGRAM(s) Oral at bedtime  clopidogrel Tablet 75 milliGRAM(s) Oral daily  folic acid 1 milliGRAM(s) Oral daily  lactated ringers. 1000 milliLiter(s) (100 mL/Hr) IV Continuous <Continuous>  midodrine. 20 milliGRAM(s) Oral three times a day  multivitamin 1 Tablet(s) Oral daily  sotalol. 40 milliGRAM(s) Oral every 24 hours  thiamine 100 milliGRAM(s) Oral daily    MEDICATIONS  (PRN):  acetaminophen     Tablet .. 650 milliGRAM(s) Oral every 6 hours PRN Temp greater or equal to 38C (100.4F), Mild Pain (1 - 3)  ondansetron Injectable 4 milliGRAM(s) IV Push every 8 hours PRN Nausea and/or Vomiting    Vital Signs Last 24 Hrs  T(C): 36.7 (07 Jul 2024 08:31), Max: 36.7 (07 Jul 2024 08:31)  T(F): 98 (07 Jul 2024 08:31), Max: 98 (07 Jul 2024 08:31)  HR: 85 (07 Jul 2024 08:31) (75 - 93)  BP: 114/66 (07 Jul 2024 08:31) (102/62 - 114/66)  BP(mean): --  RR: 18 (07 Jul 2024 08:31) (18 - 19)  SpO2: 96% (07 Jul 2024 08:31) (96% - 98%)    Parameters below as of 07 Jul 2024 08:31  Patient On (Oxygen Delivery Method): room air    PE  NAD  Awake, alert  Anicteric, MMM  RRR  CTAB  Abd soft, NT, ND  No c/c/e  No rash grossly        07-07    139  |  108  |  32<H>  ----------------------------<  124<H>  3.6   |  14<L>  |  1.41<H>    Ca    9.8      07 Jul 2024 07:08  Phos  4.0     07-07  Mg     1.8     07-07

## 2024-07-07 NOTE — PROGRESS NOTE ADULT - PROBLEM SELECTOR PLAN 2
reports had watery diarrhea for months,   - d/w Cardiology d/c'ed Colchicine  - f/u GI PCR results  - unlikely Cdiff, however will check for completion purposes   - check chromogranin A level given h/o neuroendocrine tumor, f/u heme/onc if diarrhea is 2/2 neuroendocrine tumor will need Octreotide

## 2024-07-07 NOTE — PROGRESS NOTE ADULT - ASSESSMENT
77 yo male with PMH of CAD s/p PCI x3 with most recent stent 6/12/24 on Plavix, chronic Afib on eliquis, orthostatic hypotension on midodrine, PAD, 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 Patient states since discharge on this past Saturday, he continued to feel ongoing generalized weakness and dizziness with positional changes despite compliance with home midodrine 15mg TID and holding torsemide as instructed as of day PTA . Admits to poor PO intake of fluids/solute due to ongoing issues with poor appetite and nausea with meals which is not new. In the ED, vitals notable for SBP 92-11s. Labs notable for elevated BUN/Cr 31/1.59 (from 30/1.18 on day of discharge 6/29/24). CXR with clear lungs. Abd US with innumerable intrahepatic echogenic masses consistent with known metastatic neuroendocrine tumor. Admitted to medicine for symptomatic hypotension and RAZIA.      1- RAZIA  resolved   2- orthostatic hypotension   3- hx chf   4- pericarditis   5- diarrhea     cr abn in setting of hypotension ATN however overall cr is improving   off  SGLT 2 inh as well as diuretics for now   acidosis   can sotalol be changed to aother agent ?   monitor for diarrhea given also on colchicine   midodrine -20 mg tid   add northera 100mg tid   acidosis trend bicarb  IVF hydration given diarrhea    check lactic acid again and beta hydroxy add sodium bicarbonate 1300 mg tid for now as well    colcrys given diarrhea due to Neuro endocrine or colcrys   endo work up in progress  strict I/O  orthostatics daily

## 2024-07-07 NOTE — PROGRESS NOTE ADULT - PROBLEM SELECTOR PLAN 3
known metastatic pancreatic neuroendocrine tumor  - [place] with [place]. Sophia Prasad at [facility] [place]  - radiation therapy currently on hold given recent cardiac issues awaiting cards clearance to resume  - outpatient f/u with Dr. Prasad upon discharge

## 2024-07-07 NOTE — PROGRESS NOTE ADULT - PROBLEM SELECTOR PLAN 8
started on colchicine last admissions for post cath pericarditis with improvement in symptoms  - d/w Cardiology dc'ed colchicine 0.6mg daily

## 2024-07-07 NOTE — PROGRESS NOTE ADULT - ASSESSMENT
79 yo male with PMH of CAD s/p PCI x3 with most recent stent 6/12/24 on Plavix, chronic Afib on eliquis, orthostatic hypotension on midodrine, PAD, 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    1. Pancreatic NET- metastatic   -- was on lanreotide then had POD in liver and started on peptide receptor radiotherapy (PRRT)- typically given every 8 weeks for 4 doses. He received one dose on 10/20/2023 and planned to get his 2nd dose at the end of December however his course was complicated by multiple hospitalizations that were noncancer related (decompensated heart failure).   -- 5/28/24 PET Dotatate (compared to 9/2023): several new somatostatin avid osseous lesions, some faintly sclerotic, suspicious for mets. Increased upper RP nodes, probably metastatic. Decreased intensely tracer avid right supradiaphragmatic and upper abdominal nodes, suspicious for metastasis. Redemonstrated intensely somatostatin avid bilobar hepatic lesions, consistent with metastases again morphologically difficult to delineate.   -- f/u with Dr. Sophia Prasad at [facility] [place] after discharge, no plan for treatment inpatient     2. Hypotension   - Monitoring orthostatics  - Follow up cardiology recommendations  - Per endocrine, adrenal insufficiency ruled out given AM cortisol of 17, continues midodrine    3. Diarrhea  - Continues IV fluids  - Follow up GI PCR    Hugo Topete PA-C  Hematology/Oncology  [facility]  [phone] (office) 77 yo male with PMH of CAD s/p PCI x3 with most recent stent 6/12/24 on Plavix, chronic Afib on eliquis, orthostatic hypotension on midodrine, PAD, 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    1. Pancreatic NET- metastatic   -- was on lanreotide then had POD in liver and started on peptide receptor radiotherapy (PRRT)- typically given every 8 weeks for 4 doses. He received one dose on 10/20/2023 and planned to get his 2nd dose at the end of December however his course was complicated by multiple hospitalizations that were noncancer related (decompensated heart failure).   -- 5/28/24 PET Dotatate (compared to 9/2023): several new somatostatin avid osseous lesions, some faintly sclerotic, suspicious for mets. Increased upper RP nodes, probably metastatic. Decreased intensely tracer avid right supradiaphragmatic and upper abdominal nodes, suspicious for metastasis. Redemonstrated intensely somatostatin avid bilobar hepatic lesions, consistent with metastases again morphologically difficult to delineate.   -- f/u with Dr. Sophia Prasad at [facility] [place] after discharge, no plan for treatment inpatient     2. Hypotension   - Monitoring orthostatics  - Follow up cardiology recommendations  - Per endocrine, adrenal insufficiency ruled out given AM cortisol of 17, continues midodrine    3. Diarrhea  - Continues IV fluids  - Follow up GI PCR  - will obtain chromgranin levels   - if no infectious etiology found will consider octreotide     Hugo Topete PA-C  Hematology/Oncology  [facility]  [phone] (office)

## 2024-07-08 NOTE — PROGRESS NOTE ADULT - PROBLEM SELECTOR PLAN 2
reports had watery diarrhea for months,   - d/w Cardiology d/c'ed Colchicine  - f/u GI PCR results  - unlikely Cdiff, however will check for completion purposes   - check chromogranin A level given h/o neuroendocrine tumor, f/u heme/onc if diarrhea is 2/2 neuroendocrine tumor will need Octreotide reports had watery diarrhea for months,   - d/w Cardiology d/c'ed Colchicine  - f/u GI PCR results, Norovirus indeterminate with plan for repeat  - unlikely Cdiff, however will check for completion purposes   - check chromogranin A level given h/o neuroendocrine tumor, f/u heme/onc if diarrhea is 2/2 neuroendocrine tumor will need Octreotide

## 2024-07-08 NOTE — PROGRESS NOTE ADULT - SUBJECTIVE AND OBJECTIVE BOX
Patient is a 78y old  Male who presents with a chief complaint of hypotension (08 Jul 2024 10:59)    Patient seen and examined at bedside, Continues to have diarrhea. Stool testing sent.   MEDICATIONS  (STANDING):  allopurinol 100 milliGRAM(s) Oral two times a day  apixaban 5 milliGRAM(s) Oral every 12 hours  ascorbic acid 500 milliGRAM(s) Oral daily  atorvastatin 80 milliGRAM(s) Oral at bedtime  clopidogrel Tablet 75 milliGRAM(s) Oral daily  folic acid 1 milliGRAM(s) Oral daily  lactated ringers. 1000 milliLiter(s) (100 mL/Hr) IV Continuous <Continuous>  lidocaine   4% Patch 1 Patch Transdermal daily  midodrine. 20 milliGRAM(s) Oral three times a day  multivitamin 1 Tablet(s) Oral daily  sodium bicarbonate 1300 milliGRAM(s) Oral three times a day  sotalol. 40 milliGRAM(s) Oral every 24 hours  thiamine 100 milliGRAM(s) Oral daily    MEDICATIONS  (PRN):  acetaminophen     Tablet .. 650 milliGRAM(s) Oral every 6 hours PRN Temp greater or equal to 38C (100.4F), Mild Pain (1 - 3)  ondansetron Injectable 4 milliGRAM(s) IV Push every 8 hours PRN Nausea and/or Vomiting    Vital Signs Last 24 Hrs  T(C): 36.4 (08 Jul 2024 08:35), Max: 36.7 (07 Jul 2024 20:30)  T(F): 97.5 (08 Jul 2024 08:35), Max: 98 (07 Jul 2024 20:30)  HR: 83 (08 Jul 2024 04:10) (78 - 84)  BP: 95/60 (08 Jul 2024 04:10) (95/60 - 120/65)  BP(mean): --  RR: 17 (08 Jul 2024 04:10) (17 - 18)  SpO2: 98% (08 Jul 2024 04:10) (97% - 98%)    Parameters below as of 08 Jul 2024 04:10  Patient On (Oxygen Delivery Method): room air    PE  NAD  Awake, alert  Anicteric, MMM  RRR  CTAB  Abd soft, NT, ND  No c/c/e  No rash grossly                          10.0   11.43 )-----------( 356      ( 08 Jul 2024 07:26 )             30.7       07-08    140  |  108  |  46<H>  ----------------------------<  118<H>  3.7   |  17<L>  |  1.24    Ca    9.3      08 Jul 2024 07:26  Phos  2.6     07-08  Mg     1.7     07-08

## 2024-07-08 NOTE — PROGRESS NOTE ADULT - NS ATTEND AMEND GEN_ALL_CORE FT
continues with hypotension related to diarrhea  Norovirus indeterminate , repeating today  consider octreotide if infectious workup negative  Continue otherwise current plan per the above note

## 2024-07-08 NOTE — PROGRESS NOTE ADULT - ATTENDING COMMENTS
- GI PCR indeterminant: repeat GI PCR ORDEREd, however patient has not and BM today, Cdiff pending  - c/w IVF today  - Heme/ nephrology and cardiology following  - daily OH  - chromogranin A level (pending results)

## 2024-07-08 NOTE — PROGRESS NOTE ADULT - SUBJECTIVE AND OBJECTIVE BOX
DATE OF SERVICE: 07-08-24 @ 14:10    Patient is a 78y old  Male who presents with a chief complaint of hypotension (08 Jul 2024 11:57)      INTERVAL HISTORY: Reports dizziness when standing, denies chest pain and SOB    REVIEW OF SYSTEMS:  CONSTITUTIONAL: No weakness  EYES/ENT: No visual changes;  No throat pain   NECK: No pain or stiffness  RESPIRATORY: No cough, wheezing; No shortness of breath  CARDIOVASCULAR: No chest pain or palpitations  GASTROINTESTINAL: No abdominal  pain. No nausea, vomiting, or hematemesis  GENITOURINARY: No dysuria, frequency or hematuria  NEUROLOGICAL: No stroke like symptoms  SKIN: No rashes    TELEMETRY Personally reviewed: SR 80-90  	  MEDICATIONS:  midodrine. 20 milliGRAM(s) Oral three times a day  sotalol. 40 milliGRAM(s) Oral every 24 hours        PHYSICAL EXAM:  T(C): 36.6 (07-08-24 @ 12:46), Max: 36.7 (07-07-24 @ 20:30)  HR: 90 (07-08-24 @ 12:46) (83 - 90)  BP: 108/69 (07-08-24 @ 12:46) (95/60 - 111/65)  RR: 18 (07-08-24 @ 12:46) (17 - 18)  SpO2: 97% (07-08-24 @ 12:46) (97% - 98%)  Wt(kg): --  I&O's Summary    07 Jul 2024 07:01  -  08 Jul 2024 07:00  --------------------------------------------------------  IN: 2780 mL / OUT: 800 mL / NET: 1980 mL    08 Jul 2024 07:01  -  08 Jul 2024 14:10  --------------------------------------------------------  IN: 150 mL / OUT: 0 mL / NET: 150 mL          Appearance: In no distress	  HEENT:    PERRL, EOMI	  Cardiovascular:  S1 S2, No JVD  Respiratory: Lungs clear to auscultation	  Gastrointestinal:  Soft, Non-tender, + BS	  Vascularature:  No edema of LE  Psychiatric: Appropriate affect   Neuro: no acute focal deficits                               10.0   11.43 )-----------( 356      ( 08 Jul 2024 07:26 )             30.7     07-08    140  |  108  |  46<H>  ----------------------------<  118<H>  3.7   |  17<L>  |  1.24    Ca    9.3      08 Jul 2024 07:26  Phos  2.6     07-08  Mg     1.7     07-08          Labs personally reviewed      ASSESSMENT/PLAN: 	  78-year-old male multiple medical problems history of hepatocellular carcinoma status post radiation therapy, AF s/p DCCV on Eliquis who was found to be hypotensive following NST. Patient has reported general weakness, fatigue, lightheadedness since being discharged from hospital. Reports mild chest discomfort in midsternal region. Reports dyspnea with minimal exertion. Also reports significant lack of appetite and poor PO intake. No dark or bloody stool, nausea or vomiting.       Problem/Plan - 1:  ·  Problem: Hypotension  ·  Plan: Continue to hold home torsemide and Jardiance.   - Patient presents with hypotension and also RAZIA. Has known orthostatic hypotension.   - Patient and wife report decreased PO intake likely 2/2 malignancy.  - Appreciate PT recs  - Orthos still positive: s/p fluids with improvement in degree of orthostasis  - Increase Midodrine to 20mg PO TID   - Appreciate Endocrine recommendations to r/o adrenal insufficiency  - Plan to start Northera if insurance approves   - 7/6 pt states he walked to the bathroom with no dizziness or lightheadedness  - c/w IVF given diarrhea with minimal PO intake  - 7/8 orthostatic positive      Problem/Plan - 2:  ·  Problem: CAD.   ·  Plan: Recent PCI to pLAD in June 2023  - ECG non-ischemic  - c/w Plavix and statin     Problem/Plan - 3:  ·  Problem: Atrial Fibrillation  - s/p succesful DCCV 10/31  - c/w Eliquis 5mg BID   - C/w of Sotalol 40mg PO daily (qTC wnl)    Problem/Plan - 4:  ·  Problem: H/O Pericarditis.   ·  Plan: Chest pain now improved since last admission.  -  d/c colchicine 0.6mg PO daily      Plan for discharge when asymptomatic from hypotension with OP follow up with Dr. Yoseph Rubio, KATIE-C  Hank Hayes DO [facility]  Cardiovascular Medicine  [address] [place] [address], [address]  Available via call or text on [employer]  Office: [phone]

## 2024-07-08 NOTE — PROGRESS NOTE ADULT - SUBJECTIVE AND OBJECTIVE BOX
Patient is a 78y old  Male who presents with a chief complaint of hypotension (08 Jul 2024 10:46)      SUBJECTIVE / OVERNIGHT EVENTS:    ROS:  All other review of systems negative    Allergies    No Known Allergies    Intolerances        MEDICATIONS  (STANDING):  allopurinol 100 milliGRAM(s) Oral two times a day  apixaban 5 milliGRAM(s) Oral every 12 hours  ascorbic acid 500 milliGRAM(s) Oral daily  atorvastatin 80 milliGRAM(s) Oral at bedtime  clopidogrel Tablet 75 milliGRAM(s) Oral daily  folic acid 1 milliGRAM(s) Oral daily  lactated ringers. 1000 milliLiter(s) (100 mL/Hr) IV Continuous <Continuous>  midodrine. 20 milliGRAM(s) Oral three times a day  multivitamin 1 Tablet(s) Oral daily  sodium bicarbonate 1300 milliGRAM(s) Oral three times a day  sotalol. 40 milliGRAM(s) Oral every 24 hours  thiamine 100 milliGRAM(s) Oral daily    MEDICATIONS  (PRN):  acetaminophen     Tablet .. 650 milliGRAM(s) Oral every 6 hours PRN Temp greater or equal to 38C (100.4F), Mild Pain (1 - 3)  ondansetron Injectable 4 milliGRAM(s) IV Push every 8 hours PRN Nausea and/or Vomiting      Vital Signs Last 24 Hrs  T(C): 36.6 (08 Jul 2024 04:10), Max: 36.7 (07 Jul 2024 20:30)  T(F): 97.9 (08 Jul 2024 04:10), Max: 98 (07 Jul 2024 20:30)  HR: 83 (08 Jul 2024 04:10) (78 - 84)  BP: 95/60 (08 Jul 2024 04:10) (95/60 - 120/65)  BP(mean): --  RR: 17 (08 Jul 2024 04:10) (17 - 18)  SpO2: 98% (08 Jul 2024 04:10) (97% - 98%)    Parameters below as of 08 Jul 2024 04:10  Patient On (Oxygen Delivery Method): room air      CAPILLARY BLOOD GLUCOSE        I&O's Summary    07 Jul 2024 07:01  -  08 Jul 2024 07:00  --------------------------------------------------------  IN: 2780 mL / OUT: 800 mL / NET: 1980 mL        PHYSICAL EXAM:  GENERAL: NAD, well-developed  HEAD:  Atraumatic, Normocephalic  EYES: EOMI, PERRLA, conjunctiva and sclera clear  NECK: Supple, No JVD  CHEST/LUNG: Clear to auscultation bilaterally; No wheeze  HEART: Regular rate and rhythm; No murmurs, rubs, or gallops  ABDOMEN: Soft, Nontender, Nondistended; Bowel sounds present  EXTREMITIES:  2+ Peripheral Pulses, No clubbing, cyanosis, or edema  NEUROLOGY: AAOx3, non-focal  PSYCH: calm  SKIN: No rashes or lesions    LABS:                        10.0   11.43 )-----------( 356      ( 08 Jul 2024 07:26 )             30.7     07-08    140  |  108  |  46<H>  ----------------------------<  118<H>  3.7   |  17<L>  |  1.24    Ca    9.3      08 Jul 2024 07:26  Phos  2.6     07-08  Mg     1.7     07-08            Urinalysis Basic - ( 08 Jul 2024 07:26 )    Color: x / Appearance: x / SG: x / pH: x  Gluc: 118 mg/dL / Ketone: x  / Bili: x / Urobili: x   Blood: x / Protein: x / Nitrite: x   Leuk Esterase: x / RBC: x / WBC x   Sq Epi: x / Non Sq Epi: x / Bacteria: x        RADIOLOGY & ADDITIONAL TESTS:    Imaging Personally Reviewed:    Consultant(s) Notes Reviewed:      Care Discussed with Consultants/Other Providers:    Case Discussed with Family:    Goals of Care: Patient is a 78y old  Male who presents with a chief complaint of hypotension (08 Jul 2024 10:46)      SUBJECTIVE / OVERNIGHT EVENTS:  No acute complaints overnight. Continues to endorse subjective weakness. Reports being unable to meaningfully participate with PT given vertigo on standing likely in setting of orthostatic hypotension.     ROS:  All other review of systems negative    Allergies    No Known Allergies    Intolerances        MEDICATIONS  (STANDING):  allopurinol 100 milliGRAM(s) Oral two times a day  apixaban 5 milliGRAM(s) Oral every 12 hours  ascorbic acid 500 milliGRAM(s) Oral daily  atorvastatin 80 milliGRAM(s) Oral at bedtime  clopidogrel Tablet 75 milliGRAM(s) Oral daily  folic acid 1 milliGRAM(s) Oral daily  lactated ringers. 1000 milliLiter(s) (100 mL/Hr) IV Continuous <Continuous>  midodrine. 20 milliGRAM(s) Oral three times a day  multivitamin 1 Tablet(s) Oral daily  sodium bicarbonate 1300 milliGRAM(s) Oral three times a day  sotalol. 40 milliGRAM(s) Oral every 24 hours  thiamine 100 milliGRAM(s) Oral daily    MEDICATIONS  (PRN):  acetaminophen     Tablet .. 650 milliGRAM(s) Oral every 6 hours PRN Temp greater or equal to 38C (100.4F), Mild Pain (1 - 3)  ondansetron Injectable 4 milliGRAM(s) IV Push every 8 hours PRN Nausea and/or Vomiting      Vital Signs Last 24 Hrs  T(C): 36.6 (08 Jul 2024 04:10), Max: 36.7 (07 Jul 2024 20:30)  T(F): 97.9 (08 Jul 2024 04:10), Max: 98 (07 Jul 2024 20:30)  HR: 83 (08 Jul 2024 04:10) (78 - 84)  BP: 95/60 (08 Jul 2024 04:10) (95/60 - 120/65)  BP(mean): --  RR: 17 (08 Jul 2024 04:10) (17 - 18)  SpO2: 98% (08 Jul 2024 04:10) (97% - 98%)    Parameters below as of 08 Jul 2024 04:10  Patient On (Oxygen Delivery Method): room air      CAPILLARY BLOOD GLUCOSE        I&O's Summary    07 Jul 2024 07:01  -  08 Jul 2024 07:00  --------------------------------------------------------  IN: 2780 mL / OUT: 800 mL / NET: 1980 mL        PHYSICAL EXAM:  GENERAL: NAD, well-developed  HEAD:  Atraumatic, Normocephalic  EYES: EOMI, PERRLA, conjunctiva and sclera clear  NECK: Supple, No JVD  CHEST/LUNG: Clear to auscultation bilaterally; No wheeze  HEART: Regular rate and rhythm; No murmurs, rubs, or gallops  ABDOMEN: Soft, Nontender, Nondistended; Bowel sounds present  EXTREMITIES:  2+ Peripheral Pulses, No clubbing, cyanosis, or edema  NEUROLOGY: AAOx3, non-focal  PSYCH: calm  SKIN: No rashes or lesions    LABS:                        10.0   11.43 )-----------( 356      ( 08 Jul 2024 07:26 )             30.7     07-08    140  |  108  |  46<H>  ----------------------------<  118<H>  3.7   |  17<L>  |  1.24    Ca    9.3      08 Jul 2024 07:26  Phos  2.6     07-08  Mg     1.7     07-08            Urinalysis Basic - ( 08 Jul 2024 07:26 )    Color: x / Appearance: x / SG: x / pH: x  Gluc: 118 mg/dL / Ketone: x  / Bili: x / Urobili: x   Blood: x / Protein: x / Nitrite: x   Leuk Esterase: x / RBC: x / WBC x   Sq Epi: x / Non Sq Epi: x / Bacteria: x        RADIOLOGY & ADDITIONAL TESTS:    Imaging Personally Reviewed:    Consultant(s) Notes Reviewed:      Care Discussed with Consultants/Other Providers:    Case Discussed with Family:    Goals of Care:

## 2024-07-08 NOTE — PROGRESS NOTE ADULT - ASSESSMENT
79 yo male with PMH of CAD s/p PCI x3 with most recent stent 6/12/24 on Plavix, chronic Afib on eliquis, orthostatic hypotension on midodrine, PAD, 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    1. Pancreatic NET- metastatic   -- was on lanreotide then had POD in liver and started on peptide receptor radiotherapy (PRRT)- typically given every 8 weeks for 4 doses. He received one dose on 10/20/2023 and planned to get his 2nd dose at the end of December however his course was complicated by multiple hospitalizations that were noncancer related (decompensated heart failure).   -- 5/28/24 PET Dotatate (compared to 9/2023): several new somatostatin avid osseous lesions, some faintly sclerotic, suspicious for mets. Increased upper RP nodes, probably metastatic. Decreased intensely tracer avid right supradiaphragmatic and upper abdominal nodes, suspicious for metastasis. Redemonstrated intensely somatostatin avid bilobar hepatic lesions, consistent with metastases again morphologically difficult to delineate.   -- f/u with Dr. Sophia Prasad at [facility] [place] after [place], no plan for treatment inpatient     2. Hypotension   - Monitoring orthostatics  - Follow up cardiology recommendations  - Per endocrine, adrenal insufficiency ruled out given AM cortisol of 17, continues midodrine    3. Diarrhea  - Continues IV fluids  - GI PCR indeterminate for norovirus, requested GI eval  - Chromogranin level pending  - Patient reports he was previously on Octreotide, over a year ago, and had worsened diarrhea    Hugo Topete PA-C  Hematology/Oncology  [facility]  [phone] (office)

## 2024-07-08 NOTE — PROGRESS NOTE ADULT - PROBLEM SELECTOR PLAN 1
reported with SBP 80s in PCP office despite taking midodrine dose in AM. has had ongoing dizziness with positional changes and poor PO intake of fluids/solute due to ongoing lack of appetite  - orthostatics +ve  - continue to hold home torsemide   -  home midodrine increased from 15mg TID to 20mg TID  -  f/up Cardiology and Nephrology reccs   - Orthostatics c/e IVF  - Northera 100 mg TID (not covered by insurance cost 39.47$, however given the Diarrhea is the likely cause of OH, will give IVF and treat the underlying cause, hold off on Northera) reported with SBP 80s in PCP office despite taking midodrine dose in AM. has had ongoing dizziness with positional changes and poor PO intake of fluids/solute due to ongoing lack of appetite and diarrhea  - orthostatics +ve  - continue to hold home torsemide   -  home midodrine increased from 15mg TID to 20mg TID  -  f/up Cardiology and Nephrology reccs   - Orthostatics c/e IVF  - Northera 100 mg TID (not covered by insurance cost 39.47$, however given the Diarrhea is the likely cause of OH, will give IVF and treat the underlying cause, hold off on Northera for now)

## 2024-07-08 NOTE — PROGRESS NOTE ADULT - NS ATTEND AMEND GEN_ALL_CORE FT
Seen, examined with, formulated plan with and  agree with above as scribed by NP Donna [Sarah]     bp low orthostatic but pt states has been able to ambulate albeit with fartigue   diarrhea is better being off colcrys however he has had diarrhea for month and therefore not related solely to colcyrs   juan improving   hypokalemia supplement kcl   orthostatic increase midodrine 30 mg tid

## 2024-07-08 NOTE — PROGRESS NOTE ADULT - PROBLEM SELECTOR PLAN 3
known metastatic pancreatic neuroendocrine tumor  - [place] [facility] [place] therapy currently on hold given recent cardiac issues awaiting cards clearance to resume  - outpatient f/u with Dr. Prasad upon discharge

## 2024-07-08 NOTE — PROGRESS NOTE ADULT - SUBJECTIVE AND OBJECTIVE BOX
[facility]   [phone]  RENAL FOLLOW UP NOTE  --------------------------------------------------------------------------------  Chief Complaint:    24 hour events/subjective:    patient seen and examined.   denies diarrhea  denies dizziness when ambulating    PAST HISTORY  --------------------------------------------------------------------------------  No significant changes to PMH, PSH, FHx, SHx, unless otherwise noted    ALLERGIES & MEDICATIONS  --------------------------------------------------------------------------------  Allergies    No Known Allergies    Intolerances      Standing Inpatient Medications  allopurinol 100 milliGRAM(s) Oral two times a day  apixaban 5 milliGRAM(s) Oral every 12 hours  ascorbic acid 500 milliGRAM(s) Oral daily  atorvastatin 80 milliGRAM(s) Oral at bedtime  clopidogrel Tablet 75 milliGRAM(s) Oral daily  folic acid 1 milliGRAM(s) Oral daily  lactated ringers. 1000 milliLiter(s) IV Continuous <Continuous>  midodrine. 20 milliGRAM(s) Oral three times a day  multivitamin 1 Tablet(s) Oral daily  sodium bicarbonate 1300 milliGRAM(s) Oral three times a day  sotalol. 40 milliGRAM(s) Oral every 24 hours  thiamine 100 milliGRAM(s) Oral daily    PRN Inpatient Medications  acetaminophen     Tablet .. 650 milliGRAM(s) Oral every 6 hours PRN  ondansetron Injectable 4 milliGRAM(s) IV Push every 8 hours PRN      REVIEW OF SYSTEMS  --------------------------------------------------------------------------------    Gen: denies fevers/chills,  CVS: denies chest pain/palpitations  Resp: denies SOB/Cough  GI: Denies N/V/Abd pain  : Denies dysuria/oliguria/hematuria    VITALS/PHYSICAL EXAM  --------------------------------------------------------------------------------  T(C): 36.6 (07-08-24 @ 04:10), Max: 36.7 (07-07-24 @ 20:30)  HR: 83 (07-08-24 @ 04:10) (78 - 84)  BP: 95/60 (07-08-24 @ 04:10) (95/60 - 120/65)  RR: 17 (07-08-24 @ 04:10) (17 - 18)  SpO2: 98% (07-08-24 @ 04:10) (97% - 98%)  Wt(kg): --        07-07-24 @ 07:01  -  07-08-24 @ 07:00  --------------------------------------------------------  IN: 2780 mL / OUT: 800 mL / NET: 1980 mL      Physical Exam:  	  	Gen: Non toxic comfortable appearing  	Pulm: decrease bs  no rales or ronchi or wheezing  	CV: RRR, S1S2; no rub  	Abd: +BS, soft, nontender/nondistended  	: No suprapubic tenderness  	UE: Warm, no cyanosis  no clubbing,  no edema  	LE: Warm, no cyanosis  no clubbing, no edema  	Neuro: alert and oriented. speech coherent       LABS/STUDIES  --------------------------------------------------------------------------------              10.0   11.43 >-----------<  356      [07-08-24 @ 07:26]              30.7     140  |  108  |  46  ----------------------------<  118      [07-08-24 @ 07:26]  3.7   |  17  |  1.24        Ca     9.3     [07-08-24 @ 07:26]      Mg     1.7     [07-08-24 @ 07:26]      Phos  2.6     [07-08-24 @ 07:26]            Creatinine Trend:  SCr 1.24 [07-08 @ 07:26]  SCr 1.41 [07-07 @ 07:08]  SCr 0.99 [07-05 @ 07:17]  SCr 1.08 [07-04 @ 10:37]  SCr 1.38 [07-03 @ 06:51]              Urinalysis - [07-08-24 @ 07:26]      Color  / Appearance  / SG  / pH       Gluc 118 / Ketone   / Bili  / Urobili        Blood  / Protein  / Leuk Est  / Nitrite       RBC  / WBC  / Hyaline  / Gran  / Sq Epi  / Non Sq Epi  / Bacteria     Urine Creatinine 108      [07-02-24 @ 20:10]  Urine Sodium 17      [07-02-24 @ 20:10]  Urine Urea Nitrogen 730      [07-02-24 @ 20:10]    TSH 2.47      [07-04-24 @ 12:31]       [facility]   [phone]  RENAL FOLLOW UP NOTE  --------------------------------------------------------------------------------  Chief Complaint:    24 hour events/subjective:    patient seen and examined.   denies diarrhea  denies dizziness when ambulating    PAST HISTORY  --------------------------------------------------------------------------------  No significant changes to PMH, PSH, FHx, SHx, unless otherwise noted    ALLERGIES & MEDICATIONS  --------------------------------------------------------------------------------  Allergies    No Known Allergies    Intolerances      Standing Inpatient Medications  allopurinol 100 milliGRAM(s) Oral two times a day  apixaban 5 milliGRAM(s) Oral every 12 hours  ascorbic acid 500 milliGRAM(s) Oral daily  atorvastatin 80 milliGRAM(s) Oral at bedtime  clopidogrel Tablet 75 milliGRAM(s) Oral daily  folic acid 1 milliGRAM(s) Oral daily  lactated ringers. 1000 milliLiter(s) IV Continuous <Continuous>  midodrine. 20 milliGRAM(s) Oral three times a day  multivitamin 1 Tablet(s) Oral daily  sodium bicarbonate 1300 milliGRAM(s) Oral three times a day  sotalol. 40 milliGRAM(s) Oral every 24 hours  thiamine 100 milliGRAM(s) Oral daily    PRN Inpatient Medications  acetaminophen     Tablet .. 650 milliGRAM(s) Oral every 6 hours PRN  ondansetron Injectable 4 milliGRAM(s) IV Push every 8 hours PRN      REVIEW OF SYSTEMS  --------------------------------------------------------------------------------    Gen: denies fevers/chills,  CVS: denies chest pain/palpitations  Resp: denies SOB/Cough  GI: Denies N/V/Abd pain  : Denies dysuria/oliguria/hematuria    VITALS/PHYSICAL EXAM  --------------------------------------------------------------------------------  T(C): 36.6 (07-08-24 @ 04:10), Max: 36.7 (07-07-24 @ 20:30)  HR: 83 (07-08-24 @ 04:10) (78 - 84)  BP: 95/60 (07-08-24 @ 04:10) (95/60 - 120/65)  RR: 17 (07-08-24 @ 04:10) (17 - 18)  SpO2: 98% (07-08-24 @ 04:10) (97% - 98%)  Wt(kg): --        07-07-24 @ 07:01  -  07-08-24 @ 07:00  --------------------------------------------------------  IN: 2780 mL / OUT: 800 mL / NET: 1980 mL      Physical Exam:  	  	Gen: Non toxic comfortable appearing   	Pulm: decrease bs  no rales or ronchi or wheezing  	CV: No JVD. RRR, S1S2; no rub  	Abd: +BS, soft, nontender/nondistended  	: No suprapubic tenderness  	UE: Warm, no cyanosis  no clubbing,  no edema  	LE: Warm, no cyanosis  no clubbing, no edema  	Neuro: alert and oriented. speech coherent     LABS/STUDIES  --------------------------------------------------------------------------------              10.0   11.43 >-----------<  356      [07-08-24 @ 07:26]              30.7     140  |  108  |  46  ----------------------------<  118      [07-08-24 @ 07:26]  3.7   |  17  |  1.24        Ca     9.3     [07-08-24 @ 07:26]      Mg     1.7     [07-08-24 @ 07:26]      Phos  2.6     [07-08-24 @ 07:26]            Creatinine Trend:  SCr 1.24 [07-08 @ 07:26]  SCr 1.41 [07-07 @ 07:08]  SCr 0.99 [07-05 @ 07:17]  SCr 1.08 [07-04 @ 10:37]  SCr 1.38 [07-03 @ 06:51]            Urine Creatinine 108      [07-02-24 @ 20:10]  Urine Sodium 17      [07-02-24 @ 20:10]  Urine Urea Nitrogen 730      [07-02-24 @ 20:10]    TSH 2.47      [07-04-24 @ 12:31]

## 2024-07-08 NOTE — PROGRESS NOTE ADULT - ASSESSMENT
77 yo male with PMH of CAD s/p PCI x3 with most recent stent 6/12/24 on Plavix, chronic Afib on eliquis, orthostatic hypotension on midodrine, PAD, 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 Patient states since discharge on this past Saturday, he continued to feel ongoing generalized weakness and dizziness with positional changes despite compliance with home midodrine 15mg TID and holding torsemide as instructed as of day PTA . Admits to poor PO intake of fluids/solute due to ongoing issues with poor appetite and nausea with meals which is not new. In the ED, vitals notable for SBP 92-11s. Labs notable for elevated BUN/Cr 31/1.59 (from 30/1.18 on day of discharge 6/29/24). CXR with clear lungs. Abd US with innumerable intrahepatic echogenic masses consistent with known metastatic neuroendocrine tumor. Admitted to medicine for symptomatic hypotension and RAZIA.      1- RAZIA  resolved   2- orthostatic hypotension   3- hx chf   4- pericarditis   5- diarrhea     cr abn in setting of hypotension ATN  creatinine is improving  persistent orthostatic hypotension,   repeat orthostatics again, also please check at 8am daily.   off  SGLT 2 inh as well as diuretics for now   can sotalol be changed to another agent ?   states no diarrhea, now off colchicine   increase midodrine to 30 mg tid   acidosis trend bicarb  LR to complete today  Lactate in range, BHB in range  sodium bicarbonate 1300 mg tid for now as well   endo work up in progress  strict I/O

## 2024-07-09 NOTE — CHART NOTE - NSCHARTNOTEFT_GEN_A_CORE
MICU DOWN GRADE NOTE      Patient is a 78y old  Male who presents with a chief complaint of hypotension (09 Jul 2024 13:04)      HPI: 79 yo male with PMH of CAD s/p PCI x3 with most recent stent 6/12/24 on Plavix and eliquis , chronic Afib on eliquis, orthostatic hypotension on midodrine, PAD, neuroendocrine tumor with RT currently on hold, recent admission for dx cath on 6/24/24 who presents from PCP's office for hypotension despite taking AM midodrine dose on 7/2. In the ED, vitals notable for SBP 92-11s. Labs notable for elevated BUN/Cr 31/1.59 (from 30/1.18 on day of discharge 6/29/24). CXR with clear lungs. Abd US with innumerable intrahepatic echogenic masses consistent with known metastatic neuroendocrine tumor. Admitted to medicine for symptomatic hypotension and RAZIA. Per chart, on 7/8 PM, pt was noted to have acute onset of melena x5 and coffee ground emesis x2-3. RRT was called on 7/9 AM for hypotension, hgb dropped from 9.2 to 7.4 (admission baseline 10-11), FOBT +, pt was started on PPI IV and transfused 1 unit of pRBC. His last eliquis was 6 pm 7/8 and plavix this AM. GI was consulted and pt is scheduled for EGD on 7/9 afternoon. MICU was consulted for uncontrolled bleeding during EGD.         REVIEW OF SYSTEMS:  CONSTITUTIONAL: No fever, chills  HEENT:  No blurry vision No sinus or throat pain  NECK: No pain or stiffness  RESPIRATORY: No cough, wheezing, chills or hemoptysis; No shortness of breath  CARDIOVASCULAR: No chest pain, palpitations  GASTROINTESTINAL: No abdominal pain. No nausea, vomiting, or diarrhea  GENITOURINARY: No dysuria  NEUROLOGICAL: No HA, No focal weakness  SKIN: No itching, burning, rashes, or lesions   MUSCULOSKELETAL: No joint pain or swelling; No muscle, back, or extremity pain    MEDICATIONS:  acetaminophen     Tablet .. 650 milliGRAM(s) Oral every 6 hours PRN  allopurinol 100 milliGRAM(s) Oral two times a day  ascorbic acid 500 milliGRAM(s) Oral daily  atorvastatin 80 milliGRAM(s) Oral at bedtime  clopidogrel Tablet 75 milliGRAM(s) Oral daily  folic acid 1 milliGRAM(s) Oral daily  lidocaine   4% Patch 1 Patch Transdermal daily  midodrine. 30 milliGRAM(s) Oral three times a day  multivitamin 1 Tablet(s) Oral daily  ondansetron Injectable 4 milliGRAM(s) IV Push every 8 hours PRN  pantoprazole  Injectable 40 milliGRAM(s) IV Push two times a day  sodium bicarbonate 1300 milliGRAM(s) Oral three times a day  sodium chloride 0.9%. 1000 milliLiter(s) IV Continuous <Continuous>  sotalol. 40 milliGRAM(s) Oral every 24 hours  thiamine 100 milliGRAM(s) Oral daily      T(C): 36.3 (07-09-24 @ 13:27), Max: 37.4 (07-08-24 @ 19:36)  HR: 114 (07-09-24 @ 13:27) (77 - 114)  BP: 92/54 (07-09-24 @ 13:27) (92/54 - 113/70)  RR: 16 (07-09-24 @ 13:27) (16 - 19)  SpO2: 100% (07-09-24 @ 13:27) (96% - 100%)  Wt(kg): --Vital Signs Last 24 Hrs  T(C): 36.3 (09 Jul 2024 13:27), Max: 37.4 (08 Jul 2024 19:36)  T(F): 97.3 (09 Jul 2024 13:04), Max: 99.4 (08 Jul 2024 19:36)  HR: 114 (09 Jul 2024 13:27) (77 - 114)  BP: 92/54 (09 Jul 2024 13:27) (92/54 - 113/70)  BP(mean): 71 (09 Jul 2024 13:27) (71 - 71)  RR: 16 (09 Jul 2024 13:27) (16 - 19)  SpO2: 100% (09 Jul 2024 13:27) (96% - 100%)    Parameters below as of 09 Jul 2024 13:27    O2 Flow (L/min): 2      PHYSICAL EXAM:  GENERAL: NAD, well-groomed, well-developed  HEAD:  Atraumatic, Normocephalic  EYES: EOMI, PERRLA, conjunctiva and sclera clear  ENMT:  Moist mucous membranes  NECK: Supple, No JVD,  CHEST/LUNG: Clear to auscultation bilaterally; No rales, rhonchi, wheezing, or rubs  HEART: Regular rate and rhythm; No murmurs, rubs, or gallops  ABDOMEN: Soft, Nontender, Nondistended; Bowel sounds present  NEURO: Alert & Oriented X3  EXTREMITIES: No LE edema, no calf tenderness  LYMPH: No lymphadenopathy noted  SKIN: No rashes or lesions    Consultant(s) Notes Reviewed:  [x ] YES  [ ] NO  Care Discussed with Consultants/Other Providers [ x] YES  [ ] NO    LABS:                        7.4    16.03 )-----------( 314      ( 09 Jul 2024 09:02 )             22.8     07-09    140  |  109<H>  |  79<H>  ----------------------------<  196<H>  4.9   |  15<L>  |  2.01<H>    Ca    9.1      09 Jul 2024 09:02  Phos  3.4     07-09  Mg     1.5     07-09    TPro  5.0<L>  /  Alb  2.4<L>  /  TBili  0.3  /  DBili  x   /  AST  212<H>  /  ALT  133<H>  /  AlkPhos  269<H>  07-09    PT/INR - ( 09 Jul 2024 10:22 )   PT: 17.0 sec;   INR: 1.57 ratio         PTT - ( 09 Jul 2024 10:22 )  PTT:30.3 sec  Urinalysis Basic - ( 09 Jul 2024 09:02 )    Color: x / Appearance: x / SG: x / pH: x  Gluc: 196 mg/dL / Ketone: x  / Bili: x / Urobili: x   Blood: x / Protein: x / Nitrite: x   Leuk Esterase: x / RBC: x / WBC x   Sq Epi: x / Non Sq Epi: x / Bacteria: x      CAPILLARY BLOOD GLUCOSE      POCT Blood Glucose.: 157 mg/dL (09 Jul 2024 08:39)      ABG - ( 09 Jul 2024 14:54 )  pH, Arterial: 7.21  pH, Blood: x     /  pCO2: 41    /  pO2: 238   / HCO3: 16    / Base Excess: -10.9 /  SaO2: 100.0             Urinalysis Basic - ( 09 Jul 2024 09:02 )    Color: x / Appearance: x / SG: x / pH: x  Gluc: 196 mg/dL / Ketone: x  / Bili: x / Urobili: x   Blood: x / Protein: x / Nitrite: x   Leuk Esterase: x / RBC: x / WBC x   Sq Epi: x / Non Sq Epi: x / Bacteria: x        RADIOLOGY & ADDITIONAL TESTS:    Imaging Personally Reviewed:  [x ] YES  [ ] NO      Assessment: 79 yo male with PMH of CAD s/p PCI x3 with most recent stent 6/12/24 on Plavix and eliquis , chronic Afib on eliquis, orthostatic hypotension on midodrine, PAD, neuroendocrine tumor with RT currently on hold, recent admission for dx cath on 6/24/24 who presents from PCP's office for hypotension despite taking AM midodrine dose on 7/2, admitted to medicine for symptomatic hypotension and RAZIA. Per chart, on 7/8 PM, pt was noted to have acute onset of melena x5 and coffee ground emesis x2-3. RRT was called on 7/9 AM for hypotension, hgb dropped from 9.2 to 7.4 (admission baseline 10-11), FOBT +, pt was started on PPI IV and transfused 1 unit of pRBC. His last eliquis was 6 pm 7/8 and plavix this AM. GI was consulted and pt is scheduled for EGD on 7/9 afternoon. MICU was consulted for uncontrolled bleeding during EGD.     Plan:     Neuro       CVS  - hypotension       PULM         Renal         GI   -        MICU ACCEPT NOTE      Patient is a 78y old  Male who presents with a chief complaint of hypotension (09 Jul 2024 13:04)      HPI: 79 yo male with PMH of CAD s/p PCI x3 with most recent stent 6/12/24 on Plavix and eliquis , chronic Afib on eliquis, orthostatic hypotension on midodrine, PAD, neuroendocrine tumor with RT currently on hold, recent admission for dx cath on 6/24/24 who presents from PCP's office for hypotension despite taking AM midodrine dose on 7/2. In the ED, vitals notable for SBP 92-11s. Labs notable for elevated BUN/Cr 31/1.59 (from 30/1.18 on day of discharge 6/29/24). CXR with clear lungs. Abd US with innumerable intrahepatic echogenic masses consistent with known metastatic neuroendocrine tumor. Admitted to medicine for symptomatic hypotension and RAZIA. Per chart, on 7/8 PM, pt was noted to have acute onset of melena x5 and coffee ground emesis x2-3. RRT was called on 7/9 AM for hypotension, hgb dropped from 9.2 to 7.4 (admission baseline 10-11), FOBT +, pt was started on PPI IV and transfused 1 unit of pRBC. His last eliquis was 6 pm 7/8 and plavix this AM. GI was consulted and pt is scheduled for EGD on 7/9 afternoon. MICU was consulted for uncontrolled bleeding during EGD. Pt is to be transferred to IR.         REVIEW OF SYSTEMS:  CONSTITUTIONAL: No fever, chills  HEENT:  No blurry vision No sinus or throat pain  NECK: No pain or stiffness  RESPIRATORY: No cough, wheezing, chills or hemoptysis; No shortness of breath  CARDIOVASCULAR: No chest pain, palpitations  GASTROINTESTINAL: No abdominal pain. No nausea, vomiting, or diarrhea  GENITOURINARY: No dysuria  NEUROLOGICAL: No HA, No focal weakness  SKIN: No itching, burning, rashes, or lesions   MUSCULOSKELETAL: No joint pain or swelling; No muscle, back, or extremity pain    MEDICATIONS:  acetaminophen     Tablet .. 650 milliGRAM(s) Oral every 6 hours PRN  allopurinol 100 milliGRAM(s) Oral two times a day  ascorbic acid 500 milliGRAM(s) Oral daily  atorvastatin 80 milliGRAM(s) Oral at bedtime  clopidogrel Tablet 75 milliGRAM(s) Oral daily  folic acid 1 milliGRAM(s) Oral daily  lidocaine   4% Patch 1 Patch Transdermal daily  midodrine. 30 milliGRAM(s) Oral three times a day  multivitamin 1 Tablet(s) Oral daily  ondansetron Injectable 4 milliGRAM(s) IV Push every 8 hours PRN  pantoprazole  Injectable 40 milliGRAM(s) IV Push two times a day  sodium bicarbonate 1300 milliGRAM(s) Oral three times a day  sodium chloride 0.9%. 1000 milliLiter(s) IV Continuous <Continuous>  sotalol. 40 milliGRAM(s) Oral every 24 hours  thiamine 100 milliGRAM(s) Oral daily      T(C): 36.3 (07-09-24 @ 13:27), Max: 37.4 (07-08-24 @ 19:36)  HR: 114 (07-09-24 @ 13:27) (77 - 114)  BP: 92/54 (07-09-24 @ 13:27) (92/54 - 113/70)  RR: 16 (07-09-24 @ 13:27) (16 - 19)  SpO2: 100% (07-09-24 @ 13:27) (96% - 100%)  Wt(kg): --Vital Signs Last 24 Hrs  T(C): 36.3 (09 Jul 2024 13:27), Max: 37.4 (08 Jul 2024 19:36)  T(F): 97.3 (09 Jul 2024 13:04), Max: 99.4 (08 Jul 2024 19:36)  HR: 114 (09 Jul 2024 13:27) (77 - 114)  BP: 92/54 (09 Jul 2024 13:27) (92/54 - 113/70)  BP(mean): 71 (09 Jul 2024 13:27) (71 - 71)  RR: 16 (09 Jul 2024 13:27) (16 - 19)  SpO2: 100% (09 Jul 2024 13:27) (96% - 100%)    Parameters below as of 09 Jul 2024 13:27    O2 Flow (L/min): 2      PHYSICAL EXAM:  GENERAL: NAD, well-groomed, well-developed  HEAD:  Atraumatic, Normocephalic  EYES: EOMI, PERRLA, conjunctiva and sclera clear  ENMT:  Moist mucous membranes  NECK: Supple, No JVD,  CHEST/LUNG: Clear to auscultation bilaterally; No rales, rhonchi, wheezing, or rubs  HEART: Regular rate and rhythm; No murmurs, rubs, or gallops  ABDOMEN: Soft, Nontender, Nondistended; Bowel sounds present  NEURO: Alert & Oriented X3  EXTREMITIES: No LE edema, no calf tenderness  LYMPH: No lymphadenopathy noted  SKIN: No rashes or lesions    Consultant(s) Notes Reviewed:  [x ] YES  [ ] NO  Care Discussed with Consultants/Other Providers [ x] YES  [ ] NO    LABS:                        7.4    16.03 )-----------( 314      ( 09 Jul 2024 09:02 )             22.8     07-09    140  |  109<H>  |  79<H>  ----------------------------<  196<H>  4.9   |  15<L>  |  2.01<H>    Ca    9.1      09 Jul 2024 09:02  Phos  3.4     07-09  Mg     1.5     07-09    TPro  5.0<L>  /  Alb  2.4<L>  /  TBili  0.3  /  DBili  x   /  AST  212<H>  /  ALT  133<H>  /  AlkPhos  269<H>  07-09    PT/INR - ( 09 Jul 2024 10:22 )   PT: 17.0 sec;   INR: 1.57 ratio         PTT - ( 09 Jul 2024 10:22 )  PTT:30.3 sec  Urinalysis Basic - ( 09 Jul 2024 09:02 )    Color: x / Appearance: x / SG: x / pH: x  Gluc: 196 mg/dL / Ketone: x  / Bili: x / Urobili: x   Blood: x / Protein: x / Nitrite: x   Leuk Esterase: x / RBC: x / WBC x   Sq Epi: x / Non Sq Epi: x / Bacteria: x      CAPILLARY BLOOD GLUCOSE      POCT Blood Glucose.: 157 mg/dL (09 Jul 2024 08:39)      ABG - ( 09 Jul 2024 14:54 )  pH, Arterial: 7.21  pH, Blood: x     /  pCO2: 41    /  pO2: 238   / HCO3: 16    / Base Excess: -10.9 /  SaO2: 100.0             Urinalysis Basic - ( 09 Jul 2024 09:02 )    Color: x / Appearance: x / SG: x / pH: x  Gluc: 196 mg/dL / Ketone: x  / Bili: x / Urobili: x   Blood: x / Protein: x / Nitrite: x   Leuk Esterase: x / RBC: x / WBC x   Sq Epi: x / Non Sq Epi: x / Bacteria: x        RADIOLOGY & ADDITIONAL TESTS:    Imaging Personally Reviewed:  [x ] YES  [ ] NO      Assessment: 79 yo male with PMH of CAD s/p PCI x3 with most recent stent 6/12/24 on Plavix and eliquis , chronic Afib on eliquis, orthostatic hypotension on midodrine, PAD, neuroendocrine tumor with RT currently on hold, recent admission for dx cath on 6/24/24 who presents from PCP's office for hypotension despite taking AM midodrine dose on 7/2, admitted to medicine for symptomatic hypotension and RAZIA. Per chart, on 7/8 PM, pt was noted to have acute onset of melena x5 and coffee ground emesis x2-3. RRT was called on 7/9 AM for hypotension, hgb dropped from 9.2 to 7.4 (admission baseline 10-11), FOBT +, pt was started on PPI IV and transfused 1 unit of pRBC. His last eliquis was 6 pm 7/8 and plavix this AM. GI was consulted and pt is scheduled for EGD on 7/9 afternoon. MICU was consulted for uncontrolled bleeding during EGD.     Plan:     Neuro   #    CVS  # symptomatic hypotension   - RRT called 7/9 ANM  - levophed       PULM         Renal         GI   -        MICU ACCEPT NOTE      Patient is a 78y old  Male who presents with a chief complaint of hypotension (09 Jul 2024 13:04)      HPI: 77 yo male with PMH of CAD s/p PCI x3 with most recent stent 6/12/24 on Plavix and eliquis , chronic Afib on eliquis, orthostatic hypotension on midodrine, PAD, neuroendocrine tumor with RT currently on hold, recent admission for dx cath on 6/24/24 who presents from PCP's office for hypotension despite taking AM midodrine dose on 7/2. In the ED, vitals notable for SBP 92-11s. Labs notable for elevated BUN/Cr 31/1.59 (from 30/1.18 on day of discharge 6/29/24). CXR with clear lungs. Abd US with innumerable intrahepatic echogenic masses consistent with known metastatic neuroendocrine tumor. Admitted to medicine for symptomatic hypotension and RAZIA. Per chart, on 7/8 PM, pt was noted to have acute onset of melena x5 and coffee ground emesis x2-3. RRT was called on 7/9 AM for hypotension, hgb dropped from 9.2 to 7.4 (admission baseline 10-11), FOBT +, pt was started on PPI IV and transfused 1 unit of pRBC. His last eliquis was 6 pm 7/8 and plavix this AM. GI was consulted and pt is scheduled for EGD on 7/9 afternoon. MICU was consulted for uncontrolled bleeding during EGD. Pt is to be transferred to IR.         REVIEW OF SYSTEMS:  CONSTITUTIONAL: No fever, chills  HEENT:  No blurry vision No sinus or throat pain  NECK: No pain or stiffness  RESPIRATORY: No cough, wheezing, chills or hemoptysis; No shortness of breath  CARDIOVASCULAR: No chest pain, palpitations  GASTROINTESTINAL: No abdominal pain. No nausea, vomiting, or diarrhea  GENITOURINARY: No dysuria  NEUROLOGICAL: No HA, No focal weakness  SKIN: No itching, burning, rashes, or lesions   MUSCULOSKELETAL: No joint pain or swelling; No muscle, back, or extremity pain    MEDICATIONS:  acetaminophen     Tablet .. 650 milliGRAM(s) Oral every 6 hours PRN  allopurinol 100 milliGRAM(s) Oral two times a day  ascorbic acid 500 milliGRAM(s) Oral daily  atorvastatin 80 milliGRAM(s) Oral at bedtime  clopidogrel Tablet 75 milliGRAM(s) Oral daily  folic acid 1 milliGRAM(s) Oral daily  lidocaine   4% Patch 1 Patch Transdermal daily  midodrine. 30 milliGRAM(s) Oral three times a day  multivitamin 1 Tablet(s) Oral daily  ondansetron Injectable 4 milliGRAM(s) IV Push every 8 hours PRN  pantoprazole  Injectable 40 milliGRAM(s) IV Push two times a day  sodium bicarbonate 1300 milliGRAM(s) Oral three times a day  sodium chloride 0.9%. 1000 milliLiter(s) IV Continuous <Continuous>  sotalol. 40 milliGRAM(s) Oral every 24 hours  thiamine 100 milliGRAM(s) Oral daily      T(C): 36.3 (07-09-24 @ 13:27), Max: 37.4 (07-08-24 @ 19:36)  HR: 114 (07-09-24 @ 13:27) (77 - 114)  BP: 92/54 (07-09-24 @ 13:27) (92/54 - 113/70)  RR: 16 (07-09-24 @ 13:27) (16 - 19)  SpO2: 100% (07-09-24 @ 13:27) (96% - 100%)  Wt(kg): --Vital Signs Last 24 Hrs  T(C): 36.3 (09 Jul 2024 13:27), Max: 37.4 (08 Jul 2024 19:36)  T(F): 97.3 (09 Jul 2024 13:04), Max: 99.4 (08 Jul 2024 19:36)  HR: 114 (09 Jul 2024 13:27) (77 - 114)  BP: 92/54 (09 Jul 2024 13:27) (92/54 - 113/70)  BP(mean): 71 (09 Jul 2024 13:27) (71 - 71)  RR: 16 (09 Jul 2024 13:27) (16 - 19)  SpO2: 100% (09 Jul 2024 13:27) (96% - 100%)    Parameters below as of 09 Jul 2024 13:27    O2 Flow (L/min): 2      PHYSICAL EXAM:  GENERAL: NAD, well-groomed, well-developed  HEAD:  Atraumatic, Normocephalic  EYES: EOMI, PERRLA, conjunctiva and sclera clear  ENMT:  Moist mucous membranes  NECK: Supple, No JVD,  CHEST/LUNG: Clear to auscultation bilaterally; No rales, rhonchi, wheezing, or rubs  HEART: Regular rate and rhythm; No murmurs, rubs, or gallops  ABDOMEN: Soft, Nontender, Nondistended; Bowel sounds present  NEURO: Alert & Oriented X3  EXTREMITIES: No LE edema, no calf tenderness  LYMPH: No lymphadenopathy noted  SKIN: No rashes or lesions    Consultant(s) Notes Reviewed:  [x ] YES  [ ] NO  Care Discussed with Consultants/Other Providers [ x] YES  [ ] NO    LABS:                        7.4    16.03 )-----------( 314      ( 09 Jul 2024 09:02 )             22.8     07-09    140  |  109<H>  |  79<H>  ----------------------------<  196<H>  4.9   |  15<L>  |  2.01<H>    Ca    9.1      09 Jul 2024 09:02  Phos  3.4     07-09  Mg     1.5     07-09    TPro  5.0<L>  /  Alb  2.4<L>  /  TBili  0.3  /  DBili  x   /  AST  212<H>  /  ALT  133<H>  /  AlkPhos  269<H>  07-09    PT/INR - ( 09 Jul 2024 10:22 )   PT: 17.0 sec;   INR: 1.57 ratio         PTT - ( 09 Jul 2024 10:22 )  PTT:30.3 sec  Urinalysis Basic - ( 09 Jul 2024 09:02 )    Color: x / Appearance: x / SG: x / pH: x  Gluc: 196 mg/dL / Ketone: x  / Bili: x / Urobili: x   Blood: x / Protein: x / Nitrite: x   Leuk Esterase: x / RBC: x / WBC x   Sq Epi: x / Non Sq Epi: x / Bacteria: x      CAPILLARY BLOOD GLUCOSE      POCT Blood Glucose.: 157 mg/dL (09 Jul 2024 08:39)      ABG - ( 09 Jul 2024 14:54 )  pH, Arterial: 7.21  pH, Blood: x     /  pCO2: 41    /  pO2: 238   / HCO3: 16    / Base Excess: -10.9 /  SaO2: 100.0             Urinalysis Basic - ( 09 Jul 2024 09:02 )    Color: x / Appearance: x / SG: x / pH: x  Gluc: 196 mg/dL / Ketone: x  / Bili: x / Urobili: x   Blood: x / Protein: x / Nitrite: x   Leuk Esterase: x / RBC: x / WBC x   Sq Epi: x / Non Sq Epi: x / Bacteria: x        RADIOLOGY & ADDITIONAL TESTS:    Imaging Personally Reviewed:  [x ] YES  [ ] NO      Assessment: 77 yo male with PMH of CAD s/p PCI x3 with most recent stent 6/12/24 on Plavix and eliquis , chronic Afib on eliquis, orthostatic hypotension on midodrine, PAD, neuroendocrine tumor with RT currently on hold, recent admission for dx cath on 6/24/24 who presents from PCP's office for hypotension despite taking AM midodrine dose on 7/2, admitted to medicine for symptomatic hypotension and RAZIA. Per chart, on 7/8 PM, pt was noted to have acute onset of melena x5 and coffee ground emesis x2-3. RRT was called on 7/9 AM for hypotension, hgb dropped from 9.2 to 7.4 (admission baseline 10-11), FOBT +, pt was started on PPI IV and transfused 1 unit of pRBC. His last eliquis was 6 pm 7/8 and plavix this AM. GI was consulted and pt is scheduled for EGD on 7/9 afternoon. MICU was consulted for uncontrolled bleeding during EGD.     Plan:     Neuro   # Baseline AOx3    CVS  # hemorrhagic shock   - RRT called 7/9 for hypotension   -  2/2 to Upper GI bleeds, urgently took to EGD, found bleeding ulcer that was not well controlled, underwent IR embolization   - d/c midodrine   - start levophed gtt, maintain MAP > 65  - AM cortisol 17, as per endocrine adrenal insufficiceny initially ruled out  - However, given new       #CAD s/p PCI x3, most recent stent 6/12/24, NURIA to pLAD   - hold eliquis and plavix, will touch base w/ GI and cardio on when to resume   - c/w statin   - will obtain vBG with lactate   - EKG   - obtain cardiac enzymes     #PAD  # A-fib on eliquis    - s/p successful DCCV 10/31   - hold sotalol given hypotension 2/2 GI bleeds   - hold eliquis     PULM   #Intubated for airway protection prior to EGD/IR embolization  - will hold off on SBT if any further procedures are planned   - will reassess upon arrival to MICU     Renal/  #RAZIA   #ATN i.s.o hypotension  #metabolic acidosis   - f/u nephro   - trend BUN/Cr   - monitor Is and Os   - sodium bicarbonate 1300 BID     GI   #Upper GI Bleed   - s/p 5 units of RBC   - CT A/P 7/9 - Active bleeding in the third portion of the duodenum. Progression of liver metastases.  - EGD 7/9 showed esophagitis, One non-bleeding duodenal ulcer with a clean ulcer base (Arjun Class III). One oozing duodenal ulcer with spurting hemorrhage (Arjun Class Ia). Treatment not successful. Treated with bipolar cautery.  - currently undergoing IR embolization   - PPI gtt   - Hgb goal >7, transfuse if below   - hold plavix   - hold eliquis     #Diarrhea   - c/w IVF     ID  #leukocytosis   - will obtain BCx, UCx, Sputum Cx   - monitor off Abx for now   - GI PCR indeterminated for norovirus     ENDOCRINE  #adrenal insufficiency   -     HEMATOLOGY/ONCOLOGY   - neuroendocrine tumor   - was on lanreotide then had POD in liver and started on peptide receptor radiotherapy (PRRT)- typically given every 8 weeks for 4 doses. He last received it 10/20/2023   - PET 5/28/24 shows new somatostatin avid osseous lesions; decreased intensely avid right supradiaphragmatic and upper abdominal nodes, suspicious for mets    SKINS:   - Lines  - MICU ACCEPT NOTE      Patient is a 78y old  Male who presents with a chief complaint of hypotension (09 Jul 2024 13:04)      HPI: 79 yo male with PMH of CAD s/p PCI x3 with most recent stent 6/12/24 on Plavix and eliquis , chronic Afib on eliquis, orthostatic hypotension on midodrine, PAD, neuroendocrine tumor with RT currently on hold, recent admission for dx cath on 6/24/24 who presents from PCP's office for hypotension despite taking AM midodrine dose on 7/2. In the ED, vitals notable for SBP 92-11s. Labs notable for elevated BUN/Cr 31/1.59 (from 30/1.18 on day of discharge 6/29/24). CXR with clear lungs. Abd US with innumerable intrahepatic echogenic masses consistent with known metastatic neuroendocrine tumor. Admitted to medicine for symptomatic hypotension and RAZIA. Per chart, on 7/8 PM, pt was noted to have acute onset of melena x5 and coffee ground emesis x2-3. RRT was called on 7/9 AM for hypotension, hgb dropped from 9.2 to 7.4 (admission baseline 10-11), FOBT +, pt was started on PPI IV and transfused 1 unit of pRBC. His last eliquis was 6 pm 7/8 and plavix this AM. GI was consulted and pt is scheduled for EGD on 7/9 afternoon. MICU was consulted for uncontrolled bleeding during EGD. Pt underwent IR embolization 7/9. Admitted to MICU for further monitoring.         REVIEW OF SYSTEMS:  Unable to assess given pt's intubated.     MEDICATIONS:  acetaminophen     Tablet .. 650 milliGRAM(s) Oral every 6 hours PRN  allopurinol 100 milliGRAM(s) Oral two times a day  ascorbic acid 500 milliGRAM(s) Oral daily  atorvastatin 80 milliGRAM(s) Oral at bedtime  clopidogrel Tablet 75 milliGRAM(s) Oral daily  folic acid 1 milliGRAM(s) Oral daily  lidocaine   4% Patch 1 Patch Transdermal daily  midodrine. 30 milliGRAM(s) Oral three times a day  multivitamin 1 Tablet(s) Oral daily  ondansetron Injectable 4 milliGRAM(s) IV Push every 8 hours PRN  pantoprazole  Injectable 40 milliGRAM(s) IV Push two times a day  sodium bicarbonate 1300 milliGRAM(s) Oral three times a day  sodium chloride 0.9%. 1000 milliLiter(s) IV Continuous <Continuous>  sotalol. 40 milliGRAM(s) Oral every 24 hours  thiamine 100 milliGRAM(s) Oral daily      T(C): 36.3 (07-09-24 @ 13:27), Max: 37.4 (07-08-24 @ 19:36)  HR: 114 (07-09-24 @ 13:27) (77 - 114)  BP: 92/54 (07-09-24 @ 13:27) (92/54 - 113/70)  RR: 16 (07-09-24 @ 13:27) (16 - 19)  SpO2: 100% (07-09-24 @ 13:27) (96% - 100%)  Wt(kg): --Vital Signs Last 24 Hrs  T(C): 36.3 (09 Jul 2024 13:27), Max: 37.4 (08 Jul 2024 19:36)  T(F): 97.3 (09 Jul 2024 13:04), Max: 99.4 (08 Jul 2024 19:36)  HR: 114 (09 Jul 2024 13:27) (77 - 114)  BP: 92/54 (09 Jul 2024 13:27) (92/54 - 113/70)  BP(mean): 71 (09 Jul 2024 13:27) (71 - 71)  RR: 16 (09 Jul 2024 13:27) (16 - 19)  SpO2: 100% (09 Jul 2024 13:27) (96% - 100%)    Parameters below as of 09 Jul 2024 13:27    O2 Flow (L/min): 2      PHYSICAL EXAM:  GENERAL: NAD, well-groomed, well-developed  HEAD:  Atraumatic, Normocephalic  EYES: EOMI, PERRLA, conjunctiva and sclera clear  ENMT:  Moist mucous membranes  NECK: Supple, No JVD,  CHEST/LUNG: Clear to auscultation bilaterally; No rales, rhonchi, wheezing, or rubs  HEART: Regular rate and rhythm; No murmurs, rubs, or gallops  ABDOMEN: Soft, Nontender, Nondistended; Bowel sounds present  NEURO: Alert & Oriented X3  EXTREMITIES: No LE edema, no calf tenderness  LYMPH: No lymphadenopathy noted  SKIN: No rashes or lesions    Consultant(s) Notes Reviewed:  [x ] YES  [ ] NO  Care Discussed with Consultants/Other Providers [ x] YES  [ ] NO    LABS:                        7.4    16.03 )-----------( 314      ( 09 Jul 2024 09:02 )             22.8     07-09    140  |  109<H>  |  79<H>  ----------------------------<  196<H>  4.9   |  15<L>  |  2.01<H>    Ca    9.1      09 Jul 2024 09:02  Phos  3.4     07-09  Mg     1.5     07-09    TPro  5.0<L>  /  Alb  2.4<L>  /  TBili  0.3  /  DBili  x   /  AST  212<H>  /  ALT  133<H>  /  AlkPhos  269<H>  07-09    PT/INR - ( 09 Jul 2024 10:22 )   PT: 17.0 sec;   INR: 1.57 ratio         PTT - ( 09 Jul 2024 10:22 )  PTT:30.3 sec  Urinalysis Basic - ( 09 Jul 2024 09:02 )    Color: x / Appearance: x / SG: x / pH: x  Gluc: 196 mg/dL / Ketone: x  / Bili: x / Urobili: x   Blood: x / Protein: x / Nitrite: x   Leuk Esterase: x / RBC: x / WBC x   Sq Epi: x / Non Sq Epi: x / Bacteria: x      CAPILLARY BLOOD GLUCOSE      POCT Blood Glucose.: 157 mg/dL (09 Jul 2024 08:39)      ABG - ( 09 Jul 2024 14:54 )  pH, Arterial: 7.21  pH, Blood: x     /  pCO2: 41    /  pO2: 238   / HCO3: 16    / Base Excess: -10.9 /  SaO2: 100.0             Urinalysis Basic - ( 09 Jul 2024 09:02 )    Color: x / Appearance: x / SG: x / pH: x  Gluc: 196 mg/dL / Ketone: x  / Bili: x / Urobili: x   Blood: x / Protein: x / Nitrite: x   Leuk Esterase: x / RBC: x / WBC x   Sq Epi: x / Non Sq Epi: x / Bacteria: x        RADIOLOGY & ADDITIONAL TESTS:    Imaging Personally Reviewed:  [x ] YES  [ ] NO      Assessment: 79 yo male with PMH of CAD s/p PCI x3 with most recent stent 6/12/24 on Plavix and eliquis , chronic Afib on eliquis, orthostatic hypotension on midodrine, PAD, neuroendocrine tumor with RT currently on hold, recent admission for dx cath on 6/24/24 who presents from PCP's office for hypotension despite taking AM midodrine dose on 7/2, admitted to medicine for symptomatic hypotension and RAZIA. Per chart, on 7/8 PM, pt was noted to have acute onset of melena x5 and coffee ground emesis x2-3. RRT was called on 7/9 AM for hypotension, hgb dropped from 9.2 to 7.4 (admission baseline 10-11), FOBT +, pt was started on PPI IV and transfused 1 unit of pRBC. His last eliquis was 6 pm 7/8 and plavix this AM. GI was consulted and pt is scheduled for EGD on 7/9 afternoon. MICU was consulted for uncontrolled bleeding during EGD. During EGD, pt became hypotensive and was given phenyephrine, had A-fib with RVR s/p amiodarone. Now s/p IR embolization, admitted to MICU for further moniring i.s.o hemorrhagic shock 2/2 GI bleed     Plan:     Neuro   # Baseline AOx3  - intubated   - wean off sedatives as tolerates   - ativan prn for agitation   - fentanyl prn for pain control     CVS  # hemorrhagic shock   - RRT called 7/9 for hypotension   -  2/2 to Upper GI bleeds, urgently took to EGD, found bleeding ulcer that was not well controlled, underwent IR embolization   - d/c midodrine   - start levophed gtt, maintain MAP > 65  - AM cortisol 17, as per endocrine adrenal insufficiceny initially ruled out      #CAD s/p PCI x3, most recent stent 6/12/24, NURIA to pLAD   - hold eliquis and plavix, will touch base w/ GI and cardio on when to resume   - c/w statin   - will obtain vBG with lactate   - EKG   - obtain cardiac enzymes     #PAD  # A-fib on eliquis    - s/p successful DCCV 10/31   - hold sotalol given hypotension 2/2 GI bleeds   - hold eliquis     PULM   #Intubated for airway protection prior to EGD/IR embolization  - will hold off on SBT if any further procedures are planned   - will reassess upon arrival to MICU   - CXR to confirm ETT placement     Renal/  #RAZIA   #ATN i.s.o hypotension  #metabolic acidosis   - f/u nephro   - trend BUN/Cr   - monitor Is and Os   - sodium bicarbonate 1300 BID     GI   #Upper GI Bleed   - s/p 5 units of RBC   - CT A/P 7/9 - Active bleeding in the third portion of the duodenum. Progression of liver metastases.  - EGD 7/9 showed esophagitis, One non-bleeding duodenal ulcer with a clean ulcer base (Arjun Class III). One oozing duodenal ulcer with spurting hemorrhage (Arjun Class Ia). Treatment not successful. Treated with bipolar cautery.  - currently undergoing IR embolization   - PPI gtt   - Hgb goal >7, transfuse if below   - hold plavix   - hold eliquis   - per GI - no plan for another scope unless rebleeds, keep intubated ON as of now and DO NOT put in NGT/OGT.   - per IR - no plan for procedure as of now   - continue to f/u with GI and IR     #Diarrhea   - c/w maintenaince fluids     ID  #leukocytosis   - will obtain BCx, UCx, Sputum Cx   - monitor off Abx for now   - GI PCR indeterminated for norovirus     ENDOCRINE  #adrenal insufficiency   - r/o given cortisol 17     HEMATOLOGY/ONCOLOGY   - neuroendocrine tumor   - was on lanreotide then had POD in liver and started on peptide receptor radiotherapy (PRRT)- typically given every 8 weeks for 4 doses. He last received it 10/20/2023   - PET 5/28/24 shows new somatostatin avid osseous lesions; decreased intensely avid right supradiaphragmatic and upper abdominal nodes, suspicious for mets    SKINS:   - Lines/Tubes - PIV, ETT MICU ACCEPT NOTE      Patient is a 78y old  Male who presents with a chief complaint of hypotension (09 Jul 2024 13:04)      HPI: 77 yo male with PMH of CAD s/p PCI x3 with most recent stent 6/12/24 on Plavix and eliquis , chronic Afib on eliquis, orthostatic hypotension on midodrine, PAD, neuroendocrine tumor with RT currently on hold, recent admission for dx cath on 6/24/24 who presents from PCP's office for hypotension despite taking AM midodrine dose on 7/2. In the ED, vitals notable for SBP 92-11s. Labs notable for elevated BUN/Cr 31/1.59 (from 30/1.18 on day of discharge 6/29/24). CXR with clear lungs. Abd US with innumerable intrahepatic echogenic masses consistent with known metastatic neuroendocrine tumor. Admitted to medicine for symptomatic hypotension and RAZIA. Per chart, on 7/8 PM, pt was noted to have acute onset of melena x5 and coffee ground emesis x2-3. RRT was called on 7/9 AM for hypotension, hgb dropped from 9.2 to 7.4 (admission baseline 10-11), FOBT +, pt was started on PPI IV and transfused 1 unit of pRBC. His last eliquis was 6 pm 7/8 and plavix this AM. GI was consulted and pt is scheduled for EGD on 7/9 afternoon. MICU was consulted for uncontrolled bleeding during EGD. Pt underwent IR embolization 7/9. Admitted to MICU for further monitoring.         REVIEW OF SYSTEMS:  Unable to assess given pt's intubated.     MEDICATIONS:  acetaminophen     Tablet .. 650 milliGRAM(s) Oral every 6 hours PRN  allopurinol 100 milliGRAM(s) Oral two times a day  ascorbic acid 500 milliGRAM(s) Oral daily  atorvastatin 80 milliGRAM(s) Oral at bedtime  clopidogrel Tablet 75 milliGRAM(s) Oral daily  folic acid 1 milliGRAM(s) Oral daily  lidocaine   4% Patch 1 Patch Transdermal daily  midodrine. 30 milliGRAM(s) Oral three times a day  multivitamin 1 Tablet(s) Oral daily  ondansetron Injectable 4 milliGRAM(s) IV Push every 8 hours PRN  pantoprazole  Injectable 40 milliGRAM(s) IV Push two times a day  sodium bicarbonate 1300 milliGRAM(s) Oral three times a day  sodium chloride 0.9%. 1000 milliLiter(s) IV Continuous <Continuous>  sotalol. 40 milliGRAM(s) Oral every 24 hours  thiamine 100 milliGRAM(s) Oral daily      T(C): 36.3 (07-09-24 @ 13:27), Max: 37.4 (07-08-24 @ 19:36)  HR: 114 (07-09-24 @ 13:27) (77 - 114)  BP: 92/54 (07-09-24 @ 13:27) (92/54 - 113/70)  RR: 16 (07-09-24 @ 13:27) (16 - 19)  SpO2: 100% (07-09-24 @ 13:27) (96% - 100%)  Wt(kg): --Vital Signs Last 24 Hrs  T(C): 36.3 (09 Jul 2024 13:27), Max: 37.4 (08 Jul 2024 19:36)  T(F): 97.3 (09 Jul 2024 13:04), Max: 99.4 (08 Jul 2024 19:36)  HR: 114 (09 Jul 2024 13:27) (77 - 114)  BP: 92/54 (09 Jul 2024 13:27) (92/54 - 113/70)  BP(mean): 71 (09 Jul 2024 13:27) (71 - 71)  RR: 16 (09 Jul 2024 13:27) (16 - 19)  SpO2: 100% (09 Jul 2024 13:27) (96% - 100%)    Parameters below as of 09 Jul 2024 13:27    O2 Flow (L/min): 2      PHYSICAL EXAM:  GENERAL: NAD, well-groomed, well-developed  HEAD:  Atraumatic, Normocephalic  EYES: EOMI, PERRLA, conjunctiva and sclera clear  ENMT:  Moist mucous membranes  NECK: Supple, No JVD,  CHEST/LUNG: Clear to auscultation bilaterally; No rales, rhonchi, wheezing, or rubs  HEART: Regular rate and rhythm; No murmurs, rubs, or gallops  ABDOMEN: Soft, Nontender, Nondistended; Bowel sounds present  NEURO: Alert & Oriented X3  EXTREMITIES: No LE edema, no calf tenderness  LYMPH: No lymphadenopathy noted  SKIN: No rashes or lesions    Consultant(s) Notes Reviewed:  [x ] YES  [ ] NO  Care Discussed with Consultants/Other Providers [ x] YES  [ ] NO    LABS:                        7.4    16.03 )-----------( 314      ( 09 Jul 2024 09:02 )             22.8     07-09    140  |  109<H>  |  79<H>  ----------------------------<  196<H>  4.9   |  15<L>  |  2.01<H>    Ca    9.1      09 Jul 2024 09:02  Phos  3.4     07-09  Mg     1.5     07-09    TPro  5.0<L>  /  Alb  2.4<L>  /  TBili  0.3  /  DBili  x   /  AST  212<H>  /  ALT  133<H>  /  AlkPhos  269<H>  07-09    PT/INR - ( 09 Jul 2024 10:22 )   PT: 17.0 sec;   INR: 1.57 ratio         PTT - ( 09 Jul 2024 10:22 )  PTT:30.3 sec  Urinalysis Basic - ( 09 Jul 2024 09:02 )    Color: x / Appearance: x / SG: x / pH: x  Gluc: 196 mg/dL / Ketone: x  / Bili: x / Urobili: x   Blood: x / Protein: x / Nitrite: x   Leuk Esterase: x / RBC: x / WBC x   Sq Epi: x / Non Sq Epi: x / Bacteria: x      CAPILLARY BLOOD GLUCOSE      POCT Blood Glucose.: 157 mg/dL (09 Jul 2024 08:39)      ABG - ( 09 Jul 2024 14:54 )  pH, Arterial: 7.21  pH, Blood: x     /  pCO2: 41    /  pO2: 238   / HCO3: 16    / Base Excess: -10.9 /  SaO2: 100.0             Urinalysis Basic - ( 09 Jul 2024 09:02 )    Color: x / Appearance: x / SG: x / pH: x  Gluc: 196 mg/dL / Ketone: x  / Bili: x / Urobili: x   Blood: x / Protein: x / Nitrite: x   Leuk Esterase: x / RBC: x / WBC x   Sq Epi: x / Non Sq Epi: x / Bacteria: x        RADIOLOGY & ADDITIONAL TESTS:    Imaging Personally Reviewed:  [x ] YES  [ ] NO      Assessment: 77 yo male with PMH of CAD s/p PCI x3 with most recent stent 6/12/24 on Plavix and eliquis , chronic Afib on eliquis, orthostatic hypotension on midodrine, PAD, neuroendocrine tumor with RT currently on hold, recent admission for dx cath on 6/24/24 who presents from PCP's office for hypotension despite taking AM midodrine dose on 7/2, admitted to medicine for symptomatic hypotension and RAZIA. Per chart, on 7/8 PM, pt was noted to have acute onset of melena x5 and coffee ground emesis x2-3. RRT was called on 7/9 AM for hypotension, hgb dropped from 9.2 to 7.4 (admission baseline 10-11), FOBT +, pt was started on PPI IV and transfused 1 unit of pRBC. His last eliquis was 6 pm 7/8 and plavix this AM. GI was consulted and pt is scheduled for EGD on 7/9 afternoon. MICU was consulted for uncontrolled bleeding during EGD. During EGD, pt became hypotensive and was given phenyephrine, had A-fib with RVR s/p amiodarone. Now s/p IR embolization, admitted to MICU for further moniring i.s.o hemorrhagic shock 2/2 GI bleed     Plan:     Neuro   # Baseline AOx3  - intubated   - wean off sedatives as tolerates   - ativan prn for agitation   - fentanyl prn for pain control     CVS  # hemorrhagic shock   - RRT called 7/9 for hypotension   -  2/2 to Upper GI bleeds, urgently took to EGD, found bleeding ulcer that was not well controlled, underwent IR embolization   - d/c midodrine   - start levophed gtt, maintain MAP > 65  - AM cortisol 17, as per endocrine adrenal insufficiceny initially ruled out      #CAD s/p PCI x3, most recent stent 6/12/24, NURIA to pLAD   - hold eliquis and plavix, will touch base w/ GI and cardio on when to resume   - c/w statin   - will obtain vBG with lactate   - EKG   - obtain cardiac enzymes     #PAD  # A-fib on eliquis    - s/p successful DCCV 10/31   - hold sotalol given hypotension 2/2 GI bleeds   - hold eliquis     PULM   #Intubated for airway protection prior to EGD/IR embolization  - will hold off on SBT if any further procedures are planned   - will reassess upon arrival to MICU   - CXR to confirm ETT placement     Renal/  #RAZIA   #ATN i.s.o hypotension  #metabolic acidosis   - f/u nephro   - trend BUN/Cr   - monitor Is and Os   - sodium bicarbonate 1300 BID     GI   #Upper GI Bleed   - s/p 5 units of RBC   - CT A/P 7/9 - Active bleeding in the third portion of the duodenum. Progression of liver metastases.  - EGD 7/9 showed esophagitis, One non-bleeding duodenal ulcer with a clean ulcer base (Arjun Class III). One oozing duodenal ulcer with spurting hemorrhage (Arjun Class Ia). Treatment not successful. Treated with bipolar cautery.  - currently undergoing IR embolization   - PPI gtt   - Hgb goal >7, transfuse if below   - hold plavix   - hold eliquis   - per GI - no plan for another scope unless rebleeds, keep intubated ON as of now and DO NOT put in NGT/OGT.   - per IR - no plan for procedure as of now   - continue to f/u with GI and IR     #Diarrhea   - c/w maintenaince fluids     ID  #leukocytosis   - will obtain BCx, UCx, Sputum Cx   - monitor off Abx for now   - GI PCR indeterminated for norovirus     ENDOCRINE  #adrenal insufficiency   - r/o given cortisol 17     HEMATOLOGY/ONCOLOGY   - neuroendocrine tumor   - was on lanreotide then had POD in liver and started on peptide receptor radiotherapy (PRRT)- typically given every 8 weeks for 4 doses. 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   - Massive UGI Bleed s/p IR Embolization and ICU Transfer for further management   - A&O x 0 s/p sedation and paralysis after IR Procedure on Vent Support   - Continue IV Precedex with no vent weaning/extubation plan till tomorrow   - IV Fentanyl and Ativan prn to maintain Vent Support    - Post procedural Hypotension starting low dose IV Pressor titration to MAP >65 mmHg   - Duodenal Ulcer Bleed S/P 5 PRBC transfusion   - Type & Cross, F/U Serial CBC, [place], PT/INR, Troponin, Lactate, ABG, CXR   - NPO without OGT, IV D5LR @ 60cc/Hr, IV Protonix Gtt, awaiting GI input to restart AC   - No immediate  Hemo/Oncology role for Pancreatic Neuroendocrine Tumor with Liver and Bones metastasis   - Continue to hold Eliquis, Plavix, Sotalol   - Closely monitor I&O and Renal Function in RAZIA setting   - DVT PPx - SCD only   - [facility] - Full Code       Patient seen and examined with ICU Resident/Fellow at bedside after lab data, medical records and radiology reports reviewed. I have read and agreeable in general with resident's Documented Note, Assessment and Management Plan which reflected my opinions from bedside round and discussion.   Total Critical Care Time = 45 Min excluding teaching and procedure activity. MICU ACCEPT NOTE      Patient is a 78y old  Male who presents with a chief complaint of hypotension (09 Jul 2024 13:04)      HPI: 77 yo male with PMH of CAD s/p PCI x3 with most recent stent 6/12/24 on Plavix and eliquis , chronic Afib on eliquis, orthostatic hypotension on midodrine, PAD, neuroendocrine tumor with RT currently on hold, recent admission for dx cath on 6/24/24 who presents from PCP's office for hypotension despite taking AM midodrine dose on 7/2. In the ED, vitals notable for SBP 92-11s. Labs notable for elevated BUN/Cr 31/1.59 (from 30/1.18 on day of discharge 6/29/24). CXR with clear lungs. Abd US with innumerable intrahepatic echogenic masses consistent with known metastatic neuroendocrine tumor. Admitted to medicine for symptomatic hypotension and RAZIA. Per chart, on 7/8 PM, pt was noted to have acute onset of melena x5 and coffee ground emesis x2-3. RRT was called on 7/9 AM for hypotension, hgb dropped from 9.2 to 7.4 (admission baseline 10-11), FOBT +, pt was started on PPI IV and transfused 1 unit of pRBC. His last eliquis was 6 pm 7/8 and plavix this AM. GI was consulted and pt is scheduled for EGD on 7/9 afternoon. MICU was consulted for uncontrolled bleeding during EGD. Pt underwent IR embolization 7/9. Admitted to MICU for further monitoring.         REVIEW OF SYSTEMS:  Unable to assess given pt's intubated.     MEDICATIONS:  acetaminophen     Tablet .. 650 milliGRAM(s) Oral every 6 hours PRN  allopurinol 100 milliGRAM(s) Oral two times a day  ascorbic acid 500 milliGRAM(s) Oral daily  atorvastatin 80 milliGRAM(s) Oral at bedtime  clopidogrel Tablet 75 milliGRAM(s) Oral daily  folic acid 1 milliGRAM(s) Oral daily  lidocaine   4% Patch 1 Patch Transdermal daily  midodrine. 30 milliGRAM(s) Oral three times a day  multivitamin 1 Tablet(s) Oral daily  ondansetron Injectable 4 milliGRAM(s) IV Push every 8 hours PRN  pantoprazole  Injectable 40 milliGRAM(s) IV Push two times a day  sodium bicarbonate 1300 milliGRAM(s) Oral three times a day  sodium chloride 0.9%. 1000 milliLiter(s) IV Continuous <Continuous>  sotalol. 40 milliGRAM(s) Oral every 24 hours  thiamine 100 milliGRAM(s) Oral daily      T(C): 36.3 (07-09-24 @ 13:27), Max: 37.4 (07-08-24 @ 19:36)  HR: 114 (07-09-24 @ 13:27) (77 - 114)  BP: 92/54 (07-09-24 @ 13:27) (92/54 - 113/70)  RR: 16 (07-09-24 @ 13:27) (16 - 19)  SpO2: 100% (07-09-24 @ 13:27) (96% - 100%)  Wt(kg): --Vital Signs Last 24 Hrs  T(C): 36.3 (09 Jul 2024 13:27), Max: 37.4 (08 Jul 2024 19:36)  T(F): 97.3 (09 Jul 2024 13:04), Max: 99.4 (08 Jul 2024 19:36)  HR: 114 (09 Jul 2024 13:27) (77 - 114)  BP: 92/54 (09 Jul 2024 13:27) (92/54 - 113/70)  BP(mean): 71 (09 Jul 2024 13:27) (71 - 71)  RR: 16 (09 Jul 2024 13:27) (16 - 19)  SpO2: 100% (09 Jul 2024 13:27) (96% - 100%)    Parameters below as of 09 Jul 2024 13:27    O2 Flow (L/min): 2      PHYSICAL EXAM:  GENERAL: NAD, well-groomed, well-developed  HEAD:  Atraumatic, Normocephalic  EYES: EOMI, PERRLA, conjunctiva and sclera clear  ENMT:  Moist mucous membranes  NECK: Supple, No JVD,  CHEST/LUNG: Clear to auscultation bilaterally; No rales, rhonchi, wheezing, or rubs  HEART: Regular rate and rhythm; No murmurs, rubs, or gallops  ABDOMEN: Soft, Nontender, Nondistended; Bowel sounds present  NEURO: Alert & Oriented X3  EXTREMITIES: No LE edema, no calf tenderness  LYMPH: No lymphadenopathy noted  SKIN: No rashes or lesions    Consultant(s) Notes Reviewed:  [x ] YES  [ ] NO  Care Discussed with Consultants/Other Providers [ x] YES  [ ] NO    LABS:                        7.4    16.03 )-----------( 314      ( 09 Jul 2024 09:02 )             22.8     07-09    140  |  109<H>  |  79<H>  ----------------------------<  196<H>  4.9   |  15<L>  |  2.01<H>    Ca    9.1      09 Jul 2024 09:02  Phos  3.4     07-09  Mg     1.5     07-09    TPro  5.0<L>  /  Alb  2.4<L>  /  TBili  0.3  /  DBili  x   /  AST  212<H>  /  ALT  133<H>  /  AlkPhos  269<H>  07-09    PT/INR - ( 09 Jul 2024 10:22 )   PT: 17.0 sec;   INR: 1.57 ratio         PTT - ( 09 Jul 2024 10:22 )  PTT:30.3 sec  Urinalysis Basic - ( 09 Jul 2024 09:02 )    Color: x / Appearance: x / SG: x / pH: x  Gluc: 196 mg/dL / Ketone: x  / Bili: x / Urobili: x   Blood: x / Protein: x / Nitrite: x   Leuk Esterase: x / RBC: x / WBC x   Sq Epi: x / Non Sq Epi: x / Bacteria: x      CAPILLARY BLOOD GLUCOSE      POCT Blood Glucose.: 157 mg/dL (09 Jul 2024 08:39)      ABG - ( 09 Jul 2024 14:54 )  pH, Arterial: 7.21  pH, Blood: x     /  pCO2: 41    /  pO2: 238   / HCO3: 16    / Base Excess: -10.9 /  SaO2: 100.0             Urinalysis Basic - ( 09 Jul 2024 09:02 )    Color: x / Appearance: x / SG: x / pH: x  Gluc: 196 mg/dL / Ketone: x  / Bili: x / Urobili: x   Blood: x / Protein: x / Nitrite: x   Leuk Esterase: x / RBC: x / WBC x   Sq Epi: x / Non Sq Epi: x / Bacteria: x        RADIOLOGY & ADDITIONAL TESTS:    Imaging Personally Reviewed:  [x ] YES  [ ] NO      Assessment: 77 yo male with PMH of CAD s/p PCI x3 with most recent stent 6/12/24 on Plavix and eliquis , chronic Afib on eliquis, orthostatic hypotension on midodrine, PAD, neuroendocrine tumor with RT currently on hold, recent admission for dx cath on 6/24/24 who presents from PCP's office for hypotension despite taking AM midodrine dose on 7/2, admitted to medicine for symptomatic hypotension and RAZIA. Per chart, on 7/8 PM, pt was noted to have acute onset of melena x5 and coffee ground emesis x2-3. RRT was called on 7/9 AM for hypotension, hgb dropped from 9.2 to 7.4 (admission baseline 10-11), FOBT +, pt was started on PPI IV and transfused 1 unit of pRBC. His last eliquis was 6 pm 7/8 and plavix this AM. GI was consulted and pt is scheduled for EGD on 7/9 afternoon. MICU was consulted for uncontrolled bleeding during EGD. During EGD, pt became hypotensive and was given phenyephrine, had A-fib with RVR s/p amiodarone. Now s/p IR embolization, admitted to MICU for further moniring i.s.o hemorrhagic shock 2/2 GI bleed     Plan:     Neuro   # Baseline AOx3  - intubated   - wean off sedatives as tolerates   - ativan prn for agitation   - fentanyl prn for pain control     CVS  # hemorrhagic shock   - RRT called 7/9 for hypotension   -  2/2 to Upper GI bleeds, urgently took to EGD, found bleeding ulcer that was not well controlled, underwent IR embolization   - d/c midodrine   - start levophed gtt, maintain MAP > 65  - AM cortisol 17, as per endocrine adrenal insufficiceny initially ruled out      #CAD s/p PCI x3, most recent stent 6/12/24, NURIA to pLAD   - hold eliquis and plavix, will touch base w/ GI and cardio on when to resume   - c/w statin   - will obtain vBG with lactate   - EKG   - obtain cardiac enzymes     #PAD  # A-fib on eliquis    - s/p successful DCCV 10/31   - hold sotalol given hypotension 2/2 GI bleeds   - hold eliquis     PULM   #Intubated for airway protection prior to EGD/IR embolization  - will hold off on SBT if any further procedures are planned   - will reassess upon arrival to MICU   - CXR to confirm ETT placement     Renal/  #RAZIA   #ATN i.s.o hypotension  #metabolic acidosis   - f/u nephro   - trend BUN/Cr   - monitor Is and Os   - sodium bicarbonate 1300 BID     GI   #Upper GI Bleed   - s/p 5 units of RBC   - CT A/P 7/9 - Active bleeding in the third portion of the duodenum. Progression of liver metastases.  - EGD 7/9 showed esophagitis, One non-bleeding duodenal ulcer with a clean ulcer base (Arjun Class III). One oozing duodenal ulcer with spurting hemorrhage (Arjun Class Ia). Treatment not successful. Treated with bipolar cautery.  - currently undergoing IR embolization   - PPI gtt   - Hgb goal >7, transfuse if below   - hold plavix   - hold eliquis   - per GI - no plan for another scope unless rebleeds, keep intubated ON as of now and DO NOT put in NGT/OGT.   - per IR - no plan for procedure as of now   - continue to f/u with GI and IR     #Diarrhea   - c/w maintenaince fluids     ID  #leukocytosis   - will obtain BCx, UCx, Sputum Cx   - monitor off Abx for now   - GI PCR indeterminated for norovirus     ENDOCRINE  #adrenal insufficiency   - r/o given cortisol 17     HEMATOLOGY/ONCOLOGY   - neuroendocrine tumor   - was on lanreotide then had POD in liver and started on peptide receptor radiotherapy (PRRT)- typically given every 8 weeks for 4 doses. 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   - Massive UGI Bleed s/p IR Embolization and ICU Transfer for further management   - A&O x 0 s/p sedation and paralysis after IR Procedure on Vent Support   - Continue IV Precedex with no vent weaning/extubation plan till tomorrow   - IV Fentanyl and Ativan prn to maintain Vent Support    - Post procedural Hypotension starting low dose IV Pressor titration to MAP >65 mmHg   - Duodenal Ulcer Bleed S/P 5 PRBC transfusion   - Type & Cross, F/U Serial CBC, Columba, PT/INR, Troponin, Lactate, ABG, CXR   - NPO without OGT, IV D5LR @ 60cc/Hr, IV Protonix Gtt, awaiting GI input to restart AC   - No acute Hemo/Oncology role for Pancreatic Neuroendocrine Tumor with Liver and Bones metastasis   - Continue to hold Eliquis, Plavix, Sotalol   - Closely monitor I&O and Renal Function in RAZIA setting   - DVT PPx - SCD only   - [facility] - Full Code       Patient seen and examined with ICU Resident/Fellow at bedside after lab data, medical records and radiology reports reviewed. I have read and agreeable in general with resident's Documented Note, Assessment and Management Plan which reflected my opinions from bedside round and discussion.   Total Critical Care Time = 45 Min excluding teaching and procedure activity.

## 2024-07-09 NOTE — PROGRESS NOTE ADULT - PROBLEM SELECTOR PLAN 4
known metastatic pancreatic neuroendocrine tumor  - follows with Dr. Sophia Prasad at [facility] [place]  - radiation therapy currently on hold given recent cardiac issues awaiting cards clearance to resume  - outpatient f/u with Dr. Prasad upon discharge

## 2024-07-09 NOTE — PROGRESS NOTE ADULT - PROBLEM SELECTOR PLAN 1
- orthostatics +ve  - continue to hold home torsemide   -  home midodrine increased  30mg TID  -  f/up Cardiology and Nephrology reccs   - Orthostatics c/e IVF  - Northera 100 mg TID (not covered by insurance cost 39.47$, however given the Diarrhea is the likely cause of OH, will give IVF and treat the underlying cause, hold off on Northera for now)

## 2024-07-09 NOTE — PROGRESS NOTE ADULT - NS ATTEND AMEND GEN_ALL_CORE FT
GI workup in progress given acute drop in Hb and positive occult blood  consideration to give octreotide once stable for diarrhea and infectious process ruled out  Continue ortherwise current management per the above note

## 2024-07-09 NOTE — CHART NOTE - NSCHARTNOTEFT_GEN_A_CORE
MEDICINE PA Note    Notified by RN: Pt with very dark, liquid diarrhea and brown emesis     79 yo male with PMH of CAD s/p PCI x3 with most recent stent 6/12/24 on Plavix, chronic Afib on Eliquis, orthostatic hypotension on midodrine, PAD, metastatic pancreatic 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  Hospital course was complicated by liquid diarrhea, for which day shift had ordered a GI PCR (initial indeterminate for Norovirus, repeat test pending), and a c. diff test.     Evaluated pt at bedside for complaint of nausea, brown emesis, and dark liquid stools. Stated he had three episodes of diarrhea tonight. Endorsed intermittent epigastric pain that is tender to palpation. Denies HA, dizziness, CP, SOB.  Noted to be mildly tachycardic     T(C): 36.5 (07-09-24 @ 00:05), Max: 37.4 (07-08-24 @ 19:36)  HR: 105 (07-09-24 @ 00:05) (77 - 105)  BP: 103/71 (07-09-24 @ 00:05) (95/60 - 113/70)  RR: 19 (07-09-24 @ 00:05) (17 - 19)  SpO2: 99% (07-09-24 @ 00:05) (97% - 99%)    Physical Exam:  GENERAL: in visible discomfort from nausea, however in no acute distress. A&Ox4  HEAD:  Atraumatic, Normocephalic  EYES: EOMI, PERRLA, conjunctiva and sclera clear  NECK: Supple, No JVD  CHEST/LUNG: Clear to auscultation bilaterally;   HEART: mildly tachycardic; No murmurs, rubs, or gallops  ABDOMEN: Soft, mildly TTP at epigastrium. Nondistended; Bowel sounds present  SKIN: No rashes or lesions    Labs:             9.2    16.25 )-----------( 361      ( 09 Jul 2024 00:50 )             28.4     07-08    140  |  108  |  46<H>  ----------------------------<  118<H>  3.7   |  17<L>  |  1.24    Ca    9.3      08 Jul 2024 07:26  Phos  2.6     07-08  Mg     1.7     07-08      A&P    79 yo male with PMH of CAD s/p PCI x3 with most recent stent 6/12/24 on Plavix, chronic Afib on Eliquis, orthostatic hypotension on midodrine, PAD, metastatic pancreatic neuroendocrine tumor, admitted for orthostatic hypotension. Course complicated by diarrhea, and now presenting acutely with melena and dark brown emesis.     1) GIB  - (+) FOBT  - patient with Hb ____ on admission, noted to be around _____  - patient is hemodynamically stable at this time, not requiring transfusion      - transfusion consent form filled and in chart, keep active type and screen  - would transfuse <8 given cardiac history or if symptomatic   - continue to monitor vital signs  - protonix 40mg IV BID  - place on CLD  - recommend H/H q12 hours for now while actively bleeding  - GI consulted    > type & screen, FOBT  > EKG   > serial Hgb/Hct Q6H & transfuse PRN if Hgb < 7  > Protonix 40 mg IV Q12H                          Eri López PA-C  Dept of Medicine   [employer] [facility] PA Note    Notified by RN: Pt with very dark, liquid diarrhea and brown emesis     77 yo male with PMH of CAD s/p PCI x3 with most recent stent 6/12/24 on Plavix, chronic Afib on Eliquis, orthostatic hypotension on midodrine, PAD, metastatic pancreatic 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  Hospital course was complicated by liquid diarrhea, for which day shift had ordered a GI PCR (initial indeterminate for Norovirus, repeat test pending), and a c. diff test.     Evaluated pt at bedside for complaint of nausea, brown emesis, and dark liquid stools. Stated he had three episodes of diarrhea tonight. Endorsed intermittent epigastric pain that is tender to palpation. Denies HA, dizziness, CP, SOB.  Noted to be mildly tachycardic     T(C): 36.5 (07-09-24 @ 00:05), Max: 37.4 (07-08-24 @ 19:36)  HR: 105 (07-09-24 @ 00:05) (77 - 105)  BP: 103/71 (07-09-24 @ 00:05) (95/60 - 113/70)  RR: 19 (07-09-24 @ 00:05) (17 - 19)  SpO2: 99% (07-09-24 @ 00:05) (97% - 99%)    Physical Exam:  GENERAL: in visible discomfort from nausea, however in no acute distress. A&Ox4  HEAD:  Atraumatic, Normocephalic  EYES: EOMI, PERRLA, conjunctiva and sclera clear  NECK: Supple, No JVD  CHEST/LUNG: Clear to auscultation bilaterally;   HEART: mildly tachycardic; No murmurs, rubs, or gallops  ABDOMEN: Soft, mildly TTP at epigastrium. Nondistended; Bowel sounds present  SKIN: No rashes or lesions    Labs:             9.2    16.25 )-----------( 361      ( 09 Jul 2024 00:50 )             28.4     07-08    140  |  108  |  46<H>  ----------------------------<  118<H>  3.7   |  17<L>  |  1.24    Ca    9.3      08 Jul 2024 07:26  Phos  2.6     07-08  Mg     1.7     07-08      A&P    77 yo male with PMH of CAD s/p PCI x3 with most recent stent 6/12/24 on Plavix, chronic Afib on Eliquis, orthostatic hypotension on midodrine, PAD, metastatic pancreatic neuroendocrine tumor, admitted for orthostatic hypotension. Course complicated by diarrhea, and now presenting acutely with melena and dark brown emesis.     1) GIB  - (+) FOBT  - NPO   - STAT hgb 9.2 (baseline ~10)   - T&S x2, CBC q12 for now.   - Would transfuse <8 given cardiac history.   - Protonix 40mg IV BID  - CT A/P w/ IV contrast  - GI emailed  - Pt hemodynamically stable, will continue to monitor VS.     Will endorse above events to AM Medicine Team.    Eri López PA-C  Department of Medicine   [employer] [facility] PA Note    Notified by RN: Pt with very dark, liquid diarrhea and brown emesis     79 yo male with PMH of CAD s/p PCI x3 with most recent stent 6/12/24 on Plavix, chronic Afib on Eliquis, orthostatic hypotension on midodrine, PAD, metastatic pancreatic 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  Hospital course was complicated by liquid diarrhea, for which day shift had ordered a GI PCR (initial indeterminate for Norovirus, repeat test pending), and a c. diff test.     Evaluated pt at bedside for complaint of nausea, brown emesis, and dark liquid stools. Stated he had three episodes of diarrhea tonight. Endorsed intermittent epigastric pain that is tender to palpation. Denies HA, dizziness, CP, SOB.  Noted to be mildly tachycardic     T(C): 36.5 (07-09-24 @ 00:05), Max: 37.4 (07-08-24 @ 19:36)  HR: 105 (07-09-24 @ 00:05) (77 - 105)  BP: 103/71 (07-09-24 @ 00:05) (95/60 - 113/70)  RR: 19 (07-09-24 @ 00:05) (17 - 19)  SpO2: 99% (07-09-24 @ 00:05) (97% - 99%)    Physical Exam:  GENERAL: in visible discomfort from nausea, however in no acute distress. A&Ox4  HEAD:  Atraumatic, Normocephalic  EYES: EOMI, PERRLA, conjunctiva and sclera clear  NECK: Supple, No JVD  CHEST/LUNG: Clear to auscultation bilaterally;   HEART: mildly tachycardic; No murmurs, rubs, or gallops  ABDOMEN: Soft, mildly TTP at epigastrium. Nondistended; Bowel sounds present  SKIN: No rashes or lesions    Labs:             9.2    16.25 )-----------( 361      ( 09 Jul 2024 00:50 )             28.4     07-08    140  |  108  |  46<H>  ----------------------------<  118<H>  3.7   |  17<L>  |  1.24    Ca    9.3      08 Jul 2024 07:26  Phos  2.6     07-08  Mg     1.7     07-08      A&P    79 yo male with PMH of CAD s/p PCI x3 with most recent stent 6/12/24 on Plavix, chronic Afib on Eliquis, orthostatic hypotension on midodrine, PAD, metastatic pancreatic neuroendocrine tumor, admitted for orthostatic hypotension. Course complicated by diarrhea, and now presenting acutely with melena and dark brown emesis.     1) GIB  - (+) FOBT  - NPO   - STAT hgb 9.2 (baseline ~10)   - T&S x2, CBC q12 for now.   - Would transfuse <8 given cardiac history.   - Protonix 40mg IV BID  - CT A/P w/ IV contrast  - GI emailed  - Discontinued Eliquis for now  - Pt hemodynamically stable, will continue to monitor VS.     Will endorse above events to AM Medicine Team.    Eri López PA-C  Department of Medicine   [employer]

## 2024-07-09 NOTE — CONSULT NOTE ADULT - ASSESSMENT
77 yo M with PMH of CAD s/p PCI x3 s/p stent 6/12/24 on plavix, AFib on eliquis, NE tumor of liver/pancreas s/p RT, PAD, and orthostatic hypotension on midodrine presenting for lightheadedness and dizziness. He was admitted for initial complaint of lightheadedness and low bp despite midodrine.  79 yo M with PMH of CAD s/p PCI x3 s/p stent 6/12/24 on plavix, AFib on eliquis, NE tumor of liver s/p RT, PAD, and orthostatic hypotension on midodrine presenting for lightheadedness and dizziness. He was admitted for initial complaint of lightheadedness and low bp despite midodrine.  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     Impression:  #Melena/Coffee ground emesis  #Acute on chronic blood loss anemia  #Renal failure with azotemia  Developed acute onset of melena and coffee ground emesis overnight with >2 point drop in Hgb in 7 hrs with accompanied azotemia c/f UGIB. Suspect PUD in setting of eliquis and plavix use. Bleeding from gastric/intestinal NE tumor also on ddx as pt has unknown primary NE tumor. No signs of cirrhosis to suggest varices though pt does have liver dysfunction from metastases so remains on ddx. Gastritis, esophagitis, duodenitis, dieulafoey lesion and angiodysplasias also on ddx. Given severe abdominal tenderness on exam, need to consider patient had ulcer that has now perforated.    Recommendations:  - Transfuse 1 unit pRBC for Hgb<8  - Trend cbc q6h, maintain active T&S, trend INR  - Hold eliquis. Continue plavix given recent stent  - Keep patient NPO  - Obtain CT abdomen to rule out perforation prior to scope  - If no perforation on CT, plan for endoscopy today  - Please have cardiology document risk assessment regarding scope and plavix prior to scope    #Neuroendocrine tumor  #Diarrhea  Hx of known metastatic NE tumor involving liver with unknown primary. Possible that has upper GI primary tumor (48% arise from GI tract). 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 Low suspicion for infectious diarrhea given mild chronic diarrhea    Recommendations:  - Send 5-HIAA and chromogranin level  - F/u stool studies  - Consider octreotide initiation pending above workup       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, PAD, and orthostatic hypotension on midodrine presenting for lightheadedness He was admitted for initial complaint of lightheadedness and low bp despite midodrine, now with melena and coffee ground emesis and acute blood loss anemia.     Impression:  #Melena/Coffee ground emesis  #Acute on chronic blood loss anemia  #Renal failure with azotemia  Developed acute onset of melena and coffee ground emesis overnight with >2 point drop in Hgb in 7 hrs with accompanied azotemia c/f UGIB. Suspect PUD in setting of eliquis and plavix use. Bleeding from gastric/intestinal NE tumor also on ddx as pt has unknown primary NE tumor. No signs of cirrhosis to suggest varices though pt does have liver dysfunction from metastases so remains on ddx. Gastritis, esophagitis, duodenitis, dieulafoey lesion and angiodysplasias also on ddx. Given severe abdominal tenderness on exam, need to consider patient had ulcer that has now perforated.    Recommendations:  - Transfuse 1 unit pRBC for Hgb<8  - Trend cbc q6h, maintain active T&S, trend INR  - Hold eliquis. Continue plavix given recent stent  - Keep patient NPO  - Obtain CT abdomen to rule out perforation prior to scope    All recommendations are tentative until note is attested by attending.     Bailey Choudhury, PGY4  Gastroenterology/Hepatology Fellow  Available on [employer] Teams  [phone] (Long Range Pager)  [phone] (Short Range Pager [facility])    After 5 pm, please contact the on-call GI fellow for any urgent issues via the Hospital Call    - If no perforation on CT, plan for endoscopy today  - Please have cardiology document risk assessment regarding scope and plavix prior to scope    #Neuroendocrine tumor  #Diarrhea  Hx of known metastatic NE tumor involving liver with unknown primary. Possible that has upper GI primary tumor (48% arise from GI tract). 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 Low suspicion for infectious diarrhea given mild chronic diarrhea    Recommendations:  - Send 5-HIAA and chromogranin level  - F/u stool studies  - Consider octreotide initiation pending above workup       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, PAD, and orthostatic hypotension on midodrine presenting for lightheadedness He was admitted for initial complaint of lightheadedness and low bp despite midodrine, now with melena and coffee ground emesis and acute blood loss anemia.     Impression:  #Melena/Coffee ground emesis  #Acute on chronic blood loss anemia  #Renal failure with azotemia  Developed acute onset of melena and coffee ground emesis overnight with >2 point drop in Hgb in 7 hrs with accompanied azotemia c/f UGIB. Suspect PUD in setting of eliquis and plavix use. Bleeding from gastric/intestinal NE tumor also on ddx as pt has unknown primary NE tumor. No signs of cirrhosis to suggest varices though pt does have liver dysfunction from metastases so remains on ddx. Gastritis, esophagitis, duodenitis, dieulafoey lesion and angiodysplasias also on ddx. Given severe abdominal tenderness on exam, need to consider patient had ulcer that has now perforated.    Recommendations:  - Transfuse 1 unit pRBC for Hgb<8  - Trend cbc q6h, maintain active T&S, trend INR  - Hold eliquis. Continue plavix given recent stent  - Keep patient NPO  - Obtain CT abdomen to rule out perforation prior to scope  - If no perforation on CT, plan for endoscopy today  - Please have cardiology document risk assessment regarding scope and plavix prior to scope    #Neuroendocrine tumor  #Diarrhea  Hx of known metastatic NE tumor involving liver with unknown primary. Possible that has upper GI primary tumor (48% arise from GI tract). 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 Low suspicion for infectious diarrhea given mild chronic diarrhea    Recommendations:  - Send 5-HIAA and chromogranin level  - F/u stool studies  - Consider octreotide initiation pending above workup    All recommendations are tentative until note is attested by attending.     Bailey Choudhury, PGY4  Gastroenterology/Hepatology Fellow  Available on [employer] Teams  [phone] (Long Range Pager)  [phone] (Short Range Pager [facility])    After 5 pm, please contact the on-call GI fellow for any urgent issues via the Hospital Call

## 2024-07-09 NOTE — PROGRESS NOTE ADULT - SUBJECTIVE AND OBJECTIVE BOX
Patient is a 78y old  Male who presents with a chief complaint of hypotension (09 Jul 2024 11:52)    Pt seen and examined, transport team present to bring patient for CT. RRT earlier for hypotension, SOB, pallor.   MEDICATIONS  (STANDING):  allopurinol 100 milliGRAM(s) Oral two times a day  ascorbic acid 500 milliGRAM(s) Oral daily  atorvastatin 80 milliGRAM(s) Oral at bedtime  clopidogrel Tablet 75 milliGRAM(s) Oral daily  folic acid 1 milliGRAM(s) Oral daily  lidocaine   4% Patch 1 Patch Transdermal daily  magnesium sulfate  IVPB 1 Gram(s) IV Intermittent once  midodrine. 30 milliGRAM(s) Oral three times a day  multivitamin 1 Tablet(s) Oral daily  pantoprazole  Injectable 40 milliGRAM(s) IV Push two times a day  sodium bicarbonate 1300 milliGRAM(s) Oral three times a day  sodium chloride 0.9%. 1000 milliLiter(s) (75 mL/Hr) IV Continuous <Continuous>  sotalol. 40 milliGRAM(s) Oral every 24 hours  thiamine 100 milliGRAM(s) Oral daily    MEDICATIONS  (PRN):  acetaminophen     Tablet .. 650 milliGRAM(s) Oral every 6 hours PRN Temp greater or equal to 38C (100.4F), Mild Pain (1 - 3)  ondansetron Injectable 4 milliGRAM(s) IV Push every 8 hours PRN Nausea and/or Vomiting      Vital Signs Last 24 Hrs  T(C): 36.3 (09 Jul 2024 04:55), Max: 37.4 (08 Jul 2024 19:36)  T(F): 97.4 (09 Jul 2024 04:55), Max: 99.4 (08 Jul 2024 19:36)  HR: 111 (09 Jul 2024 04:55) (77 - 111)  BP: 95/67 (09 Jul 2024 04:55) (95/67 - 113/70)  BP(mean): --  RR: 19 (09 Jul 2024 04:55) (18 - 19)  SpO2: 96% (09 Jul 2024 04:55) (96% - 99%)    Parameters below as of 09 Jul 2024 04:55  Patient On (Oxygen Delivery Method): room air    PE  NAD  Awake, alert  Anicteric, MMM  No c/c/e  No rash grossly                          7.4    16.03 )-----------( 314      ( 09 Jul 2024 09:02 )             22.8     07-09    140  |  109<H>  |  79<H>  ----------------------------<  196<H>  4.9   |  15<L>  |  2.01<H>    Ca    9.1      09 Jul 2024 09:02  Phos  3.4     07-09  Mg     1.5     07-09    TPro  5.0<L>  /  Alb  2.4<L>  /  TBili  0.3  /  DBili  x   /  AST  212<H>  /  ALT  133<H>  /  AlkPhos  269<H>  07-09

## 2024-07-09 NOTE — PROGRESS NOTE ADULT - SUBJECTIVE AND OBJECTIVE BOX
Patient is moderate risk for low risk, urgent endoscopy.  No cardiac contraindication to proceeed.  Should continue Plavix given recent Stent. DATE OF SERVICE: 07-09-24 @ 23:08    Patient is a 78y old  Male who presents with a chief complaint of hypotension (09 Jul 2024 13:04)      INTERVAL HISTORY: GIB this am    	  MEDICATIONS:  phenylephrine    Infusion 0.8 MICROgram(s)/kG/Min IV Continuous <Continuous>        PHYSICAL EXAM:  T(C): 10 (07-09-24 @ 22:00), Max: 39.1 (07-09-24 @ 22:00)  HR: 93 (07-09-24 @ 23:00) (75 - 118)  BP: 92/54 (07-09-24 @ 13:27) (92/54 - 103/71)  RR: 26 (07-09-24 @ 23:00) (16 - 34)  SpO2: 98% (07-09-24 @ 23:00) (96% - 100%)  Wt(kg): --  I&O's Summary    08 Jul 2024 07:01  -  09 Jul 2024 07:00  --------------------------------------------------------  IN: 1159.6 mL / OUT: 550 mL / NET: 609.6 mL    09 Jul 2024 07:01  -  09 Jul 2024 23:08  --------------------------------------------------------  IN: 302.8 mL / OUT: 165 mL / NET: 137.8 mL      Height (cm): 188 (07-09 @ 13:27)  Weight (kg): 98.7 (07-09 @ 13:27)  BMI (kg/m2): 27.9 (07-09 @ 13:27)  BSA (m2): 2.25 (07-09 @ 13:27)    Appearance: In no distress	  HEENT:    PERRL, EOMI	  Cardiovascular:  S1 S2, No JVD  Respiratory: Lungs clear to auscultation	  Gastrointestinal:  Soft, Non-tender, + BS	  Vascularature:  No edema of LE  Psychiatric: Appropriate affect   Neuro: no acute focal deficits                               11.4   12.77 )-----------( 161      ( 09 Jul 2024 18:29 )             34.0     07-09    142  |  110<H>  |  77<H>  ----------------------------<  160<H>  4.2   |  17<L>  |  2.07<H>    Ca    9.7      09 Jul 2024 18:36  Phos  5.6     07-09  Mg     1.6     07-09    TPro  5.0<L>  /  Alb  2.4<L>  /  TBili  0.3  /  DBili  x   /  AST  212<H>  /  ALT  133<H>  /  AlkPhos  269<H>  07-09        Labs personally reviewed      ASSESSMENT/PLAN: 	  78-year-old male multiple medical problems history of hepatocellular carcinoma status post radiation therapy, AF s/p DCCV on Eliquis who was found to be hypotensive following NST. Patient has reported general weakness, fatigue, lightheadedness since being discharged from hospital. Reports mild chest discomfort in midsternal region. Reports dyspnea with minimal exertion. Also reports significant lack of appetite and poor PO intake. No dark or bloody stool, nausea or vomiting.       Problem/Plan - 1:  ·  Problem: Hypotension  ·  Plan: Continue to hold home torsemide and Jardiance.   - Patient presents with hypotension and also RAZIA. Has known orthostatic hypotension.   - Patient and wife report decreased PO intake likely 2/2 malignancy.  - Appreciate PT recs  - Orthos still positive: s/p fluids with improvement in degree of orthostasis  - Increase Midodrine to 20mg PO TID   - Appreciate Endocrine recommendations to r/o adrenal insufficiency  - Plan to start Northera if insurance approves   - 7/6 pt states he walked to the bathroom with no dizziness or lightheadedness  - c/w IVF given diarrhea with minimal PO intake     Problem/Plan - 2:  ·  Problem: CAD.   ·  Plan: Recent PCI to pLAD in June 2023  - ECG non-ischemic  - c/w Plavix and statin     Problem/Plan - 3:  ·  Problem: Atrial Fibrillation  - s/p succesful DCCV 10/31  - Hold Eliquis 5mg BID given GIB  - C/w of Sotalol 40mg PO daily (qTC wnl)    Problem/Plan - 4:  ·  Problem: H/O Pericarditis.   ·  Plan: Chest pain now improved since last admission.  -  d/c colchicine 0.6mg PO daily    Problem/Plan - 4:  ·  Problem: Preop Risk Stratification.   ·  Plan: Patient is moderate risk for low risk, urgent endoscopy.  No cardiac contraindication to proceeed.  Should continue Plavix given recent Stent.             Hank Hayes DO [facility]  Cardiovascular Medicine  [address], [address]  Office: [phone]  Available via Text/call on [employer] Teams

## 2024-07-09 NOTE — PRE PROCEDURE NOTE - PRE PROCEDURE EVALUATION
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 He was sent in from outpatient office visit for complaint of lightheadedness and low bp to systolics in 80s despite midodrine. Patient with melena and abdominal pain. 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 RRT today. Patient taken to endoscopy with inability to control bleeding. No clip placed. Of note, patient has history of recent stent 6/12/24 requiring plavix and eliquis with last eliquis dose at 6 pm on 7/8 and plavix dose this morning.     Allergies: No Known Allergies    Medications (Abx/Cardiac/Anticoagulation/Blood Products)  apixaban: 5 milliGRAM(s) Oral (07-08 @ 05:56)  clopidogrel Tablet: 75 milliGRAM(s) Oral (07-08 @ 12:19)  midodrine.: 20 milliGRAM(s) Oral (07-08 @ 12:19)  sotalol.: 40 milliGRAM(s) Oral (07-08 @ 05:56)    Data:  T(C): 36.3  HR: 114  BP: 92/54  RR: 16  SpO2: 100%    Exam  General: Intubated  Chest: Non labored breathing    -WBC 16.03 / HgB 7.4 / Hct 22.8 / Plt 314  -Na 140 / Cl 109 / BUN 79 / Glucose 196  -K 4.9 / CO2 15 / Cr 2.01  - / Alk Phos 269 / T.Bili 0.3  -INR1.57    Imaging: Reviewed    Plan: 78y Male presents for mesenteric angiogram, possible embolization.   -Risks/Benefits/alternatives explained with the patient and/or healthcare proxy and witnessed informed consent obtained.

## 2024-07-09 NOTE — PROGRESS NOTE ADULT - PROBLEM SELECTOR PLAN 3
reports had watery diarrhea for months,   - d/w Cardiology d/c'ed Colchicine  - f/u GI PCR results, Norovirus indeterminate with plan for repeat (pending results)  - unlikely Cdiff, however will check for completion purposes   - check chromogranin A level

## 2024-07-09 NOTE — CONSULT NOTE ADULT - ATTENDING COMMENTS
Agree with above.  The patient has new melena with acute blood loss anemia and abdominal pain on exam.  CT reviewed personally from this morning.  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  Will plan for upper endoscopy for hemostasis, but if it is likely due to malignancy, endoscopic evaluation is usually temporary.  Keep patient NPO, give IV PPI, transfuse as needed. Will need general anesthesia as patient reports nausea.

## 2024-07-09 NOTE — CONSULT NOTE ADULT - SUBJECTIVE AND OBJECTIVE BOX
Chief Complaint: melena      HPI:  Holly Espinal is a 79 yo M with PMH of CAD s/p PCI x3 s/p stent 6/12/24 on plavix, AFib on eliquis, NE tumor of liver/pancreas s/p RT, PAD, and orthostatic hypotension on midodrine presenting for lightheadedness and dizziness. He was admitted for initial complaint of lightheadedness and low bp despite midodrine. GI consulted for acute onset of melena and coffee ground emesis overnight. He reports worsening epigastric abdominal pain for 5 days, radiating to entire abdomen. Patient noted to have 5 episodes of melena overnight and nausea with 2-3 episodes of coffee ground emesis overnight.     RRT called this morning for hypotension with accompanied tachycardia. Labs notable for Hgb drop to 7.4 from 9.2 in past 7 hours.     Patient has history of neuroendocrine tumors involving pancreas and liver. He reports chronic mild abdominal pain and loose brown diarrhea with 2-3 stools daily at baseline for past several months, occasionally waking patient overnight. He has never had an endoscopy and reports last colonoscopy was 12 years ago. He notes having a negative Cologaurd 2 years ago.    Allergies:  No Known Allergies      Home Medications:    Hospital Medications:  acetaminophen     Tablet .. 650 milliGRAM(s) Oral every 6 hours PRN  allopurinol 100 milliGRAM(s) Oral two times a day  ascorbic acid 500 milliGRAM(s) Oral daily  atorvastatin 80 milliGRAM(s) Oral at bedtime  clopidogrel Tablet 75 milliGRAM(s) Oral daily  folic acid 1 milliGRAM(s) Oral daily  lactated ringers. 1000 milliLiter(s) IV Continuous <Continuous>  lidocaine   4% Patch 1 Patch Transdermal daily  midodrine. 30 milliGRAM(s) Oral three times a day  multivitamin 1 Tablet(s) Oral daily  ondansetron Injectable 4 milliGRAM(s) IV Push every 8 hours PRN  pantoprazole  Injectable 40 milliGRAM(s) IV Push two times a day  sodium bicarbonate 1300 milliGRAM(s) Oral three times a day  sotalol. 40 milliGRAM(s) Oral every 24 hours  thiamine 100 milliGRAM(s) Oral daily      PMHX/PSHX:  Hypertension    Hypercholesterolemia    PAD (peripheral artery disease)    Neuroendocrine carcinoma    Hepatic carcinoma    Atrial fibrillation and flutter    CAD (coronary artery disease)    S/P knee replacement, bilateral    S/P hip replacement    History of hernia repair    H/O laminectomy    History of lumbosacral spine surgery    History of cardioversion        Family history:  Family history of coronary artery disease    No pertinent family history in first degree relatives        Denies family history of colon cancer/polyps, stomach cancer/polyps, pancreatic cancer/masses, liver cancer/disease, ovarian cancer and endometrial cancer.    Social History:     Tob: Denies  EtOH: As above  Illicit Drugs: Denies    ROS:   General:  No wt loss, fevers, chills, night sweats, fatigue  Eyes:  Good vision, no reported pain  ENT:  No sore throat, pain, runny nose, dysphagia  CV:  No pain, palpitations, hypo/hypertension  Pulm:  No dyspnea, cough, tachypnea, wheezing  GI:  As per HPI  :  No pain, bleeding, incontinence, nocturia  Muscle:  No pain, weakness  Neuro:  No weakness, tingling, memory problems  Psych:  No fatigue, insomnia, mood problems, depression  Endocrine:  No polyuria, polydipsia, cold/heat intolerance  Heme:  No petechiae, ecchymosis, easy bruisability  Skin:  No rash, tattoos, scars, edema    PHYSICAL EXAM:   GENERAL:  No acute distress  HEENT:  Normocephalic/atraumatic, no scleral icterus  CHEST:  No accessory muscle use  HEART:  Regular rate and rhythm  ABDOMEN:  Soft, non-tender, non-distended, normoactive bowel sounds,  no masses, no hepato-splenomegaly, no signs of chronic liver disease  EXTREMITIES: No cyanosis, clubbing, or edema  SKIN:  No rash  NEURO:  Alert and oriented x 3, no asterixis    Vital Signs:  Vital Signs Last 24 Hrs  T(C): 36.3 (09 Jul 2024 04:55), Max: 37.4 (08 Jul 2024 19:36)  T(F): 97.4 (09 Jul 2024 04:55), Max: 99.4 (08 Jul 2024 19:36)  HR: 111 (09 Jul 2024 04:55) (77 - 111)  BP: 95/67 (09 Jul 2024 04:55) (95/67 - 113/70)  BP(mean): --  RR: 19 (09 Jul 2024 04:55) (18 - 19)  SpO2: 96% (09 Jul 2024 04:55) (96% - 99%)    Parameters below as of 09 Jul 2024 04:55  Patient On (Oxygen Delivery Method): room air      Daily     Daily     LABS:                        7.4    16.03 )-----------( 314      ( 09 Jul 2024 09:02 )             22.8     Mean Cell Volume: 94.6 fl (07-09-24 @ 09:02)    07-09    140  |  109<H>  |  79<H>  ----------------------------<  196<H>  4.9   |  15<L>  |  2.01<H>    Ca    9.1      09 Jul 2024 09:02  Phos  3.4     07-09  Mg     1.5     07-09    TPro  5.0<L>  /  Alb  2.4<L>  /  TBili  0.3  /  DBili  x   /  AST  212<H>  /  ALT  133<H>  /  AlkPhos  269<H>  07-09    LIVER FUNCTIONS - ( 09 Jul 2024 09:02 )  Alb: 2.4 g/dL / Pro: 5.0 g/dL / ALK PHOS: 269 U/L / ALT: 133 U/L / AST: 212 U/L / GGT: x           PT/INR - ( 09 Jul 2024 10:22 )   PT: 17.0 sec;   INR: 1.57 ratio         PTT - ( 09 Jul 2024 10:22 )  PTT:30.3 sec  Urinalysis Basic - ( 09 Jul 2024 09:02 )    Color: x / Appearance: x / SG: x / pH: x  Gluc: 196 mg/dL / Ketone: x  / Bili: x / Urobili: x   Blood: x / Protein: x / Nitrite: x   Leuk Esterase: x / RBC: x / WBC x   Sq Epi: x / Non Sq Epi: x / Bacteria: x                              7.4    16.03 )-----------( 314      ( 09 Jul 2024 09:02 )             22.8                         7.4    15.80 )-----------( 346      ( 09 Jul 2024 07:37 )             23.2                         9.2    16.25 )-----------( 361      ( 09 Jul 2024 00:50 )             28.4                         10.0   11.43 )-----------( 356      ( 08 Jul 2024 07:26 )             30.7       Imaging:           Chief Complaint: melena      HPI:  Holly Espinal is a 79 yo M with PMH of CAD s/p PCI x3 s/p stent 6/12/24 on plavix, AFib on eliquis, NE tumor of liver/pancreas s/p RT, PAD, and orthostatic hypotension on midodrine presenting for lightheadedness and dizziness. He was admitted for initial complaint of lightheadedness and low bp despite midodrine. GI consulted for acute onset of melena and coffee ground emesis overnight. He reports worsening sharp epigastric abdominal pain for 5 days, radiating to entire abdomen. Patient was noted to have 5 large episodes of melena overnight. He also endorsed nausea with 2-3 episodes of coffee ground emesis overnight. He has no history of GI bleed and has never had melena, hematochezia, or hematemesis in the past. Of note, patient has history of recent stent 6/12/24 requiring plavix and eliquis with last eliquis dose at 6 pm on 7/8 and plavix dose this morning.     RRT called this morning for hypotension to 95/67 with accompanied tachycardia to 111. Labs notable for Hgb drop to 7.4 from 9.2 in past 7 hours (level on admission 10-11) with rising BUN and Cr. Workup also notable for rising wbc count, positive FOBT and elevated liver enzymes. US abdomen showed nodular liver with echogenic masses throughout the liver. He was started on IV PPI and transfused 1 unit pRBC during RRT.     Patient has history of neuroendocrine tumors involving pancreas and liver. He reports chronic mild abdominal pain and loose brown diarrhea with 2-3 stools daily at baseline for past several months, occasionally waking him overnight. He has never had an endoscopy and reports last colonoscopy was 12 years ago. He reports having a negative cologuard 2 years ago. He does not take any acid suppressant medications or iron supplements at baseline.     Allergies:  No Known Allergies      Home Medications:  Home Medications:  allopurinol 100 mg oral tablet: 1 tab(s) orally 2 times a day (02 Jul 2024 18:36)  ascorbic acid 500 mg oral tablet: 1 tab(s) orally once a day (02 Jul 2024 18:36)  clopidogrel 75 mg oral tablet: 1 tab(s) orally once a day (02 Jul 2024 18:36)  Eliquis 5 mg oral tablet: 1 tab(s) orally 2 times a day (02 Jul 2024 18:36)  folic acid 1 mg oral tablet: 1 tab(s) orally once a day (02 Jul 2024 18:36)  Jardiance 10 mg oral tablet: 1 tab(s) orally once a day (02 Jul 2024 18:36)  midodrine 10 mg oral tablet: 1 tab(s) orally 3 times a day (02 Jul 2024 18:36)  midodrine 5 mg oral tablet: 1 tab(s) orally 3 times a day (02 Jul 2024 18:36)  Multiple Vitamins oral tablet: 1 tab(s) orally once a day (02 Jul 2024 18:36)  rosuvastatin 40 mg oral tablet: 1 tab(s) orally once a day (02 Jul 2024 18:36)  Zetia 10 mg oral tablet: 1 tab(s) orally once a day (02 Jul 2024 18:36)    Hospital Medications:  acetaminophen     Tablet .. 650 milliGRAM(s) Oral every 6 hours PRN  allopurinol 100 milliGRAM(s) Oral two times a day  ascorbic acid 500 milliGRAM(s) Oral daily  atorvastatin 80 milliGRAM(s) Oral at bedtime  clopidogrel Tablet 75 milliGRAM(s) Oral daily  folic acid 1 milliGRAM(s) Oral daily  lactated ringers. 1000 milliLiter(s) IV Continuous <Continuous>  lidocaine   4% Patch 1 Patch Transdermal daily  midodrine. 30 milliGRAM(s) Oral three times a day  multivitamin 1 Tablet(s) Oral daily  ondansetron Injectable 4 milliGRAM(s) IV Push every 8 hours PRN  pantoprazole  Injectable 40 milliGRAM(s) IV Push two times a day  sodium bicarbonate 1300 milliGRAM(s) Oral three times a day  sotalol. 40 milliGRAM(s) Oral every 24 hours  thiamine 100 milliGRAM(s) Oral daily      PMHX/PSHX:    Hypertension  Hypercholesterolemia  PAD (peripheral artery disease)  Neuroendocrine carcinoma  Hepatic carcinoma  Atrial fibrillation and flutter  CAD (coronary artery disease)  S/P knee replacement, bilateral  S/P hip replacement  History of hernia repair  H/O laminectomy  History of lumbosacral spine surgery  History of cardioversion        Family history:  Family history of coronary artery disease  No pertinent family history in first degree relatives    Denies family history of colon cancer/polyps, stomach cancer/polyps, pancreatic cancer/masses, liver cancer/disease, ovarian cancer and endometrial cancer.    Social History:   Tob: Denies  EtOH: As above  Illicit Drugs: Denies    ROS:   General:  No wt loss, fevers, chills, night sweats, fatigue  Eyes:  Good vision, no reported pain  ENT:  No sore throat, pain, runny nose, dysphagia  CV:  No pain, palpitations, hypo/hypertension  Pulm:  No dyspnea, cough, tachypnea, wheezing  GI:  As per HPI  :  No pain, bleeding, incontinence, nocturia  Muscle:  No pain, weakness  Neuro:  No weakness, tingling, memory problems  Psych:  No fatigue, insomnia, mood problems, depression  Endocrine:  No polyuria, polydipsia, cold/heat intolerance  Heme:  No petechiae, ecchymosis, easy bruisability  Skin:  No rash, tattoos, scars, edema    PHYSICAL EXAM:   GENERAL:  uncomfortable appearing, no acute distress  HEENT:  Normocephalic/atraumatic, no scleral icterus  CHEST:  No accessory muscle use  HEART:  Irregular rhythm, slightly tachycardiac  ABDOMEN:  Soft, non-distended, NBS, diffusely tender- worse in epigastrium, no masses, no hepatosplenomegaly, no signs of chronic liver disease  EXTREMITIES: No cyanosis, clubbing, or edema  SKIN:  No rash  NEURO:  Alert and oriented x 3, no asterixis    Vital Signs:  Vital Signs Last 24 Hrs  T(C): 36.3 (09 Jul 2024 04:55), Max: 37.4 (08 Jul 2024 19:36)  T(F): 97.4 (09 Jul 2024 04:55), Max: 99.4 (08 Jul 2024 19:36)  HR: 111 (09 Jul 2024 04:55) (77 - 111)  BP: 95/67 (09 Jul 2024 04:55) (95/67 - 113/70)  RR: 19 (09 Jul 2024 04:55) (18 - 19)  SpO2: 96% (09 Jul 2024 04:55) (96% - 99%)    Parameters below as of 09 Jul 2024 04:55  Patient On (Oxygen Delivery Method): room air      Daily     Daily     LABS:                        7.4    16.03 )-----------( 314      ( 09 Jul 2024 09:02 )             22.8     Mean Cell Volume: 94.6 fl (07-09-24 @ 09:02)    07-09    140  |  109<H>  |  79<H>  ----------------------------<  196<H>  4.9   |  15<L>  |  2.01<H>    Ca    9.1      09 Jul 2024 09:02  Phos  3.4     07-09  Mg     1.5     07-09    TPro  5.0<L>  /  Alb  2.4<L>  /  TBili  0.3  /  DBili  x   /  AST  212<H>  /  ALT  133<H>  /  AlkPhos  269<H>  07-09    LIVER FUNCTIONS - ( 09 Jul 2024 09:02 )  Alb: 2.4 g/dL / Pro: 5.0 g/dL / ALK PHOS: 269 U/L / ALT: 133 U/L / AST: 212 U/L / GGT: x           PT/INR - ( 09 Jul 2024 10:22 )   PT: 17.0 sec;   INR: 1.57 ratio         PTT - ( 09 Jul 2024 10:22 )  PTT:30.3 sec  Urinalysis Basic - ( 09 Jul 2024 09:02 )    Color: x / Appearance: x / SG: x / pH: x  Gluc: 196 mg/dL / Ketone: x  / Bili: x / Urobili: x   Blood: x / Protein: x / Nitrite: x   Leuk Esterase: x / RBC: x / WBC x   Sq Epi: x / Non Sq Epi: x / Bacteria: x                              7.4    16.03 )-----------( 314      ( 09 Jul 2024 09:02 )             22.8                         7.4    15.80 )-----------( 346      ( 09 Jul 2024 07:37 )             23.2                         9.2    16.25 )-----------( 361      ( 09 Jul 2024 00:50 )             28.4                         10.0   11.43 )-----------( 356      ( 08 Jul 2024 07:26 )             30.7       Imaging:  US abdomen:  Innumerable intrahepatic echogenic masses consistent with known   metastatic neuroendocrine tumor.           Chief Complaint: melena      HPI:  Holly Espinal is a 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, PAD, and orthostatic hypotension on midodrine presenting for lightheadedness and dizziness. He was admitted for initial complaint of lightheadedness and low bp despite midodrine. GI consulted for acute onset of melena and coffee ground emesis overnight. He reports worsening sharp epigastric abdominal pain for 5 days, radiating to entire abdomen. Patient was noted to have 5 large episodes of melena overnight. He also endorsed nausea with 2-3 episodes of coffee ground emesis overnight. He has no history of GI bleed and has never had melena, hematochezia, or hematemesis in the past. Of note, patient has history of recent stent 6/12/24 requiring plavix and eliquis with last eliquis dose at 6 pm on 7/8 and plavix dose this morning.     RRT called this morning for hypotension to 95/67 with accompanied tachycardia to 111. Labs notable for Hgb drop to 7.4 from 9.2 in past 7 hours (level on admission 10-11) with rising BUN and Cr. Workup also notable for rising wbc count, positive FOBT and elevated liver enzymes. US abdomen showed nodular liver with echogenic masses throughout the liver. He was started on IV PPI and transfused 1 unit pRBC during RRT.     Patient has history of neuroendocrine tumors involving liver and also pancreas per patient. [place] [facility] [place]. He is unaware of primary source of NE tumor and has never had a EGD. He reports his last colonoscopy was 12 years ago and had a negative cologuard 2 years ago.  He reports chronic mild abdominal pain and loose brown diarrhea with 2-3 stools daily at baseline for past several months, occasionally waking him overnight. He does not take any acid suppressant medications or iron supplements at baseline.     Allergies:  No Known Allergies      Home Medications:  Home Medications:  allopurinol 100 mg oral tablet: 1 tab(s) orally 2 times a day (02 Jul 2024 18:36)  ascorbic acid 500 mg oral tablet: 1 tab(s) orally once a day (02 Jul 2024 18:36)  clopidogrel 75 mg oral tablet: 1 tab(s) orally once a day (02 Jul 2024 18:36)  Eliquis 5 mg oral tablet: 1 tab(s) orally 2 times a day (02 Jul 2024 18:36)  folic acid 1 mg oral tablet: 1 tab(s) orally once a day (02 Jul 2024 18:36)  Jardiance 10 mg oral tablet: 1 tab(s) orally once a day (02 Jul 2024 18:36)  midodrine 10 mg oral tablet: 1 tab(s) orally 3 times a day (02 Jul 2024 18:36)  midodrine 5 mg oral tablet: 1 tab(s) orally 3 times a day (02 Jul 2024 18:36)  Multiple Vitamins oral tablet: 1 tab(s) orally once a day (02 Jul 2024 18:36)  rosuvastatin 40 mg oral tablet: 1 tab(s) orally once a day (02 Jul 2024 18:36)  Zetia 10 mg oral tablet: 1 tab(s) orally once a day (02 Jul 2024 18:36)    Hospital Medications:  acetaminophen     Tablet .. 650 milliGRAM(s) Oral every 6 hours PRN  allopurinol 100 milliGRAM(s) Oral two times a day  ascorbic acid 500 milliGRAM(s) Oral daily  atorvastatin 80 milliGRAM(s) Oral at bedtime  clopidogrel Tablet 75 milliGRAM(s) Oral daily  folic acid 1 milliGRAM(s) Oral daily  lactated ringers. 1000 milliLiter(s) IV Continuous <Continuous>  lidocaine   4% Patch 1 Patch Transdermal daily  midodrine. 30 milliGRAM(s) Oral three times a day  multivitamin 1 Tablet(s) Oral daily  ondansetron Injectable 4 milliGRAM(s) IV Push every 8 hours PRN  pantoprazole  Injectable 40 milliGRAM(s) IV Push two times a day  sodium bicarbonate 1300 milliGRAM(s) Oral three times a day  sotalol. 40 milliGRAM(s) Oral every 24 hours  thiamine 100 milliGRAM(s) Oral daily      PMHX/PSHX:    Hypertension  Hypercholesterolemia  PAD (peripheral artery disease)  Neuroendocrine carcinoma  Hepatic carcinoma  Atrial fibrillation and flutter  CAD (coronary artery disease)  S/P knee replacement, bilateral  S/P hip replacement  History of hernia repair  H/O laminectomy  History of lumbosacral spine surgery  History of cardioversion        Family history:  Family history of coronary artery disease  No pertinent family history in first degree relatives    Denies family history of colon cancer/polyps, stomach cancer/polyps, pancreatic cancer/masses, liver cancer/disease, ovarian cancer and endometrial cancer.    Social History:   Tob: Denies  EtOH: As above  Illicit Drugs: Denies    ROS:   General:  No wt loss, fevers, chills, night sweats, fatigue  Eyes:  Good vision, no reported pain  ENT:  No sore throat, pain, runny nose, dysphagia  CV:  No pain, palpitations, hypo/hypertension  Pulm:  No dyspnea, cough, tachypnea, wheezing  GI:  As per HPI  :  No pain, bleeding, incontinence, nocturia  Muscle:  No pain, weakness  Neuro:  No weakness, tingling, memory problems  Psych:  No fatigue, insomnia, mood problems, depression  Endocrine:  No polyuria, polydipsia, cold/heat intolerance  Heme:  No petechiae, ecchymosis, easy bruisability  Skin:  No rash, tattoos, scars, edema    PHYSICAL EXAM:   GENERAL:  uncomfortable appearing, no acute distress  HEENT:  Normocephalic/atraumatic, no scleral icterus  CHEST:  No accessory muscle use  HEART:  Irregular rhythm, slightly tachycardiac  ABDOMEN:  Soft, non-distended, NBS, diffusely tender- worse in epigastrium, no masses, no hepatosplenomegaly, no signs of chronic liver disease  EXTREMITIES: No cyanosis, clubbing, or edema  SKIN:  No rash  NEURO:  Alert and oriented x 3, no asterixis    Vital Signs:  Vital Signs Last 24 Hrs  T(C): 36.3 (09 Jul 2024 04:55), Max: 37.4 (08 Jul 2024 19:36)  T(F): 97.4 (09 Jul 2024 04:55), Max: 99.4 (08 Jul 2024 19:36)  HR: 111 (09 Jul 2024 04:55) (77 - 111)  BP: 95/67 (09 Jul 2024 04:55) (95/67 - 113/70)  RR: 19 (09 Jul 2024 04:55) (18 - 19)  SpO2: 96% (09 Jul 2024 04:55) (96% - 99%)    Parameters below as of 09 Jul 2024 04:55  Patient On (Oxygen Delivery Method): room air      Daily     Daily     LABS:                        7.4    16.03 )-----------( 314      ( 09 Jul 2024 09:02 )             22.8     Mean Cell Volume: 94.6 fl (07-09-24 @ 09:02)    07-09    140  |  109<H>  |  79<H>  ----------------------------<  196<H>  4.9   |  15<L>  |  2.01<H>    Ca    9.1      09 Jul 2024 09:02  Phos  3.4     07-09  Mg     1.5     07-09    TPro  5.0<L>  /  Alb  2.4<L>  /  TBili  0.3  /  DBili  x   /  AST  212<H>  /  ALT  133<H>  /  AlkPhos  269<H>  07-09    LIVER FUNCTIONS - ( 09 Jul 2024 09:02 )  Alb: 2.4 g/dL / Pro: 5.0 g/dL / ALK PHOS: 269 U/L / ALT: 133 U/L / AST: 212 U/L / GGT: x           PT/INR - ( 09 Jul 2024 10:22 )   PT: 17.0 sec;   INR: 1.57 ratio         PTT - ( 09 Jul 2024 10:22 )  PTT:30.3 sec  Urinalysis Basic - ( 09 Jul 2024 09:02 )    Color: x / Appearance: x / SG: x / pH: x  Gluc: 196 mg/dL / Ketone: x  / Bili: x / Urobili: x   Blood: x / Protein: x / Nitrite: x   Leuk Esterase: x / RBC: x / WBC x   Sq Epi: x / Non Sq Epi: x / Bacteria: x                              7.4    16.03 )-----------( 314      ( 09 Jul 2024 09:02 )             22.8                         7.4    15.80 )-----------( 346      ( 09 Jul 2024 07:37 )             23.2                         9.2    16.25 )-----------( 361      ( 09 Jul 2024 00:50 )             28.4                         10.0   11.43 )-----------( 356      ( 08 Jul 2024 07:26 )             30.7       Imaging:  US abdomen:  Innumerable intrahepatic echogenic masses consistent with known   metastatic neuroendocrine tumor.           Chief Complaint: melena      HPI:  Holly Espinal is a 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, PAD, and orthostatic hypotension on midodrine presenting for lightheadedness and hypotension. He was sent in from outpatient office visit for complaint of lightheadedness and low bp to systolics in 80s despite midodrine. GI consulted for acute onset of melena and coffee ground emesis overnight. He reports worsening sharp epigastric abdominal pain for the past 5 days, now radiating to entire abdomen. Patient was noted to have 5 large episodes of melena overnight. He also endorsed nausea with 2-3 episodes of coffee ground emesis overnight. He has no history of GI bleed and has never had melena, hematochezia, or hematemesis in the past. Of note, patient has history of recent stent 6/12/24 requiring plavix and eliquis with last eliquis dose at 6 pm on 7/8 and plavix dose this morning.     RRT called this morning for hypotension to 95/67 with accompanied tachycardia to 111. Labs notable for Hgb drop to 7.4 from 9.2 in past 7 hours (level on admission 10-11) with rising BUN and Cr. Workup also notable for rising wbc count, positive FOBT and elevated liver enzymes. US abdomen showed nodular liver with echogenic masses throughout the liver. He was started on IV PPI and transfused 1 unit pRBC during RRT.     Patient has history of neuroendocrine tumors involving liver and also pancreas per patient. He [place] at [facility] [place] and has received RT. He is unaware of primary source of NE tumor and has never had a EGD. He reports his last colonoscopy was 12 years ago and had a negative cologuard 2 years ago.  He reports chronic mild abdominal pain and loose brown diarrhea with 2-3 stools daily at baseline for past several months, occasionally waking him overnight. He does not take any acid suppressant medications or iron supplements at baseline. He has anemia at baseline with Hgb 10-11 and had recent nl iron and b12 levels in 6/2024.     Allergies:  No Known Allergies      Home Medications:  Home Medications:  allopurinol 100 mg oral tablet: 1 tab(s) orally 2 times a day (02 Jul 2024 18:36)  ascorbic acid 500 mg oral tablet: 1 tab(s) orally once a day (02 Jul 2024 18:36)  clopidogrel 75 mg oral tablet: 1 tab(s) orally once a day (02 Jul 2024 18:36)  Eliquis 5 mg oral tablet: 1 tab(s) orally 2 times a day (02 Jul 2024 18:36)  folic acid 1 mg oral tablet: 1 tab(s) orally once a day (02 Jul 2024 18:36)  Jardiance 10 mg oral tablet: 1 tab(s) orally once a day (02 Jul 2024 18:36)  midodrine 10 mg oral tablet: 1 tab(s) orally 3 times a day (02 Jul 2024 18:36)  midodrine 5 mg oral tablet: 1 tab(s) orally 3 times a day (02 Jul 2024 18:36)  Multiple Vitamins oral tablet: 1 tab(s) orally once a day (02 Jul 2024 18:36)  rosuvastatin 40 mg oral tablet: 1 tab(s) orally once a day (02 Jul 2024 18:36)  Zetia 10 mg oral tablet: 1 tab(s) orally once a day (02 Jul 2024 18:36)    Hospital Medications:  acetaminophen     Tablet .. 650 milliGRAM(s) Oral every 6 hours PRN  allopurinol 100 milliGRAM(s) Oral two times a day  ascorbic acid 500 milliGRAM(s) Oral daily  atorvastatin 80 milliGRAM(s) Oral at bedtime  clopidogrel Tablet 75 milliGRAM(s) Oral daily  folic acid 1 milliGRAM(s) Oral daily  lactated ringers. 1000 milliLiter(s) IV Continuous <Continuous>  lidocaine   4% Patch 1 Patch Transdermal daily  midodrine. 30 milliGRAM(s) Oral three times a day  multivitamin 1 Tablet(s) Oral daily  ondansetron Injectable 4 milliGRAM(s) IV Push every 8 hours PRN  pantoprazole  Injectable 40 milliGRAM(s) IV Push two times a day  sodium bicarbonate 1300 milliGRAM(s) Oral three times a day  sotalol. 40 milliGRAM(s) Oral every 24 hours  thiamine 100 milliGRAM(s) Oral daily      PMHX/PSHX:    Hypertension  Hypercholesterolemia  PAD (peripheral artery disease)  Neuroendocrine carcinoma  Hepatic carcinoma  Atrial fibrillation and flutter  CAD (coronary artery disease)  S/P knee replacement, bilateral  S/P hip replacement  History of hernia repair  H/O laminectomy  History of lumbosacral spine surgery  History of cardioversion        Family history:  Family history of coronary artery disease  No pertinent family history in first degree relatives    Denies family history of colon cancer/polyps, stomach cancer/polyps, pancreatic cancer/masses, liver cancer/disease, ovarian cancer and endometrial cancer.    Social History:   Tob: Denies  EtOH: As above  Illicit Drugs: Denies    ROS:   General:  No wt loss, fevers, chills, night sweats, fatigue  Eyes:  Good vision, no reported pain  ENT:  No sore throat, pain, runny nose, dysphagia  CV:  No pain, palpitations, hypo/hypertension  Pulm:  No dyspnea, cough, tachypnea, wheezing  GI:  As per HPI  :  No pain, bleeding, incontinence, nocturia  Muscle:  No pain, weakness  Neuro:  No weakness, tingling, memory problems  Psych:  No fatigue, insomnia, mood problems, depression  Endocrine:  No polyuria, polydipsia, cold/heat intolerance  Heme:  No petechiae, ecchymosis, easy bruisability  Skin:  No rash, tattoos, scars, edema    PHYSICAL EXAM:   GENERAL:  uncomfortable appearing, no acute distress  HEENT:  Normocephalic/atraumatic, no scleral icterus  CHEST:  No accessory muscle use  HEART:  Irregular rhythm, slightly tachycardiac  ABDOMEN:  Soft, non-distended, NBS, diffusely tender- worse in epigastrium, no masses, no hepatosplenomegaly, no signs of chronic liver disease  EXTREMITIES: No cyanosis, clubbing, or edema  SKIN:  No rash  NEURO:  Alert and oriented x 3, no asterixis    Vital Signs:  Vital Signs Last 24 Hrs  T(C): 36.3 (09 Jul 2024 04:55), Max: 37.4 (08 Jul 2024 19:36)  T(F): 97.4 (09 Jul 2024 04:55), Max: 99.4 (08 Jul 2024 19:36)  HR: 111 (09 Jul 2024 04:55) (77 - 111)  BP: 95/67 (09 Jul 2024 04:55) (95/67 - 113/70)  RR: 19 (09 Jul 2024 04:55) (18 - 19)  SpO2: 96% (09 Jul 2024 04:55) (96% - 99%)    Parameters below as of 09 Jul 2024 04:55  Patient On (Oxygen Delivery Method): room air      Daily     Daily     LABS:                        7.4    16.03 )-----------( 314      ( 09 Jul 2024 09:02 )             22.8     Mean Cell Volume: 94.6 fl (07-09-24 @ 09:02)    07-09    140  |  109<H>  |  79<H>  ----------------------------<  196<H>  4.9   |  15<L>  |  2.01<H>    Ca    9.1      09 Jul 2024 09:02  Phos  3.4     07-09  Mg     1.5     07-09    TPro  5.0<L>  /  Alb  2.4<L>  /  TBili  0.3  /  DBili  x   /  AST  212<H>  /  ALT  133<H>  /  AlkPhos  269<H>  07-09    LIVER FUNCTIONS - ( 09 Jul 2024 09:02 )  Alb: 2.4 g/dL / Pro: 5.0 g/dL / ALK PHOS: 269 U/L / ALT: 133 U/L / AST: 212 U/L / GGT: x           PT/INR - ( 09 Jul 2024 10:22 )   PT: 17.0 sec;   INR: 1.57 ratio         PTT - ( 09 Jul 2024 10:22 )  PTT:30.3 sec  Urinalysis Basic - ( 09 Jul 2024 09:02 )    Color: x / Appearance: x / SG: x / pH: x  Gluc: 196 mg/dL / Ketone: x  / Bili: x / Urobili: x   Blood: x / Protein: x / Nitrite: x   Leuk Esterase: x / RBC: x / WBC x   Sq Epi: x / Non Sq Epi: x / Bacteria: x                              7.4    16.03 )-----------( 314      ( 09 Jul 2024 09:02 )             22.8                         7.4    15.80 )-----------( 346      ( 09 Jul 2024 07:37 )             23.2                         9.2    16.25 )-----------( 361      ( 09 Jul 2024 00:50 )             28.4                         10.0   11.43 )-----------( 679      ( 08 Jul 2024 07:26 )             30.7       Imaging:  US abdomen:  Innumerable intrahepatic echogenic masses consistent with known   metastatic neuroendocrine tumor.

## 2024-07-09 NOTE — PRE PROCEDURE NOTE - PRE PROCEDURE EVALUATION
Attending Physician: Dr Sebastian                            Procedure: EGD    Indication for Procedure: GIB  ________________________________________________________  PAST MEDICAL & SURGICAL HISTORY:  Hypertension      Hypercholesterolemia      PAD (peripheral artery disease)      Neuroendocrine carcinoma      Hepatic carcinoma      Atrial fibrillation and flutter      CAD (coronary artery disease)      S/P knee replacement, bilateral      S/P hip replacement  right hip      History of hernia repair      H/O laminectomy      History of lumbosacral spine surgery      History of cardioversion        ALLERGIES:  No Known Allergies    HOME MEDICATIONS:  allopurinol 100 mg oral tablet: 1 tab(s) orally 2 times a day  ascorbic acid 500 mg oral tablet: 1 tab(s) orally once a day  clopidogrel 75 mg oral tablet: 1 tab(s) orally once a day  Eliquis 5 mg oral tablet: 1 tab(s) orally 2 times a day  folic acid 1 mg oral tablet: 1 tab(s) orally once a day  Jardiance 10 mg oral tablet: 1 tab(s) orally once a day  midodrine 10 mg oral tablet: 1 tab(s) orally 3 times a day  midodrine 5 mg oral tablet: 1 tab(s) orally 3 times a day  Multiple Vitamins oral tablet: 1 tab(s) orally once a day  rosuvastatin 40 mg oral tablet: 1 tab(s) orally once a day  Zetia 10 mg oral tablet: 1 tab(s) orally once a day    AICD/PPM: [ ] yes   [x ] no    PERTINENT LAB DATA:                        7.4    16.03 )-----------( 314      ( 09 Jul 2024 09:02 )             22.8     07-09    140  |  109<H>  |  79<H>  ----------------------------<  196<H>  4.9   |  15<L>  |  2.01<H>    Ca    9.1      09 Jul 2024 09:02  Phos  3.4     07-09  Mg     1.5     07-09    TPro  5.0<L>  /  Alb  2.4<L>  /  TBili  0.3  /  DBili  x   /  AST  212<H>  /  ALT  133<H>  /  AlkPhos  269<H>  07-09    PT/INR - ( 09 Jul 2024 10:22 )   PT: 17.0 sec;   INR: 1.57 ratio         PTT - ( 09 Jul 2024 10:22 )  PTT:30.3 sec            PHYSICAL EXAMINATION:    T(C): 36.3  HR: 114  BP: 92/54  RR: 16  SpO2: 100%    Constitutional: NAD    Neck:  No JVD  Respiratory: NO RESP DISTRESS   Cardiovascular: RRR  Extremities: No peripheral edema  Neurological: A/O x 4, no focal deficits        COMMENTS:    The patient is a suitable candidate for the planned procedure unless box checked [ ]  No, explain:     Attending Physician: Dr Sebastian                            Procedure: EGD    Indication for Procedure: GIB  ________________________________________________________  PAST MEDICAL & SURGICAL HISTORY:  Hypertension      Hypercholesterolemia      PAD (peripheral artery disease)      Neuroendocrine carcinoma      Hepatic carcinoma      Atrial fibrillation and flutter      CAD (coronary artery disease)      S/P knee replacement, bilateral      S/P hip replacement  right hip      History of hernia repair      H/O laminectomy      History of lumbosacral spine surgery      History of cardioversion        ALLERGIES:  No Known Allergies    HOME MEDICATIONS:  allopurinol 100 mg oral tablet: 1 tab(s) orally 2 times a day  ascorbic acid 500 mg oral tablet: 1 tab(s) orally once a day  clopidogrel 75 mg oral tablet: 1 tab(s) orally once a day  Eliquis 5 mg oral tablet: 1 tab(s) orally 2 times a day  folic acid 1 mg oral tablet: 1 tab(s) orally once a day  Jardiance 10 mg oral tablet: 1 tab(s) orally once a day  midodrine 10 mg oral tablet: 1 tab(s) orally 3 times a day  midodrine 5 mg oral tablet: 1 tab(s) orally 3 times a day  Multiple Vitamins oral tablet: 1 tab(s) orally once a day  rosuvastatin 40 mg oral tablet: 1 tab(s) orally once a day  Zetia 10 mg oral tablet: 1 tab(s) orally once a day    AICD/PPM: [ ] yes   [x ] no    PERTINENT LAB DATA:                        7.4    16.03 )-----------( 314      ( 09 Jul 2024 09:02 )             22.8     07-09    140  |  109<H>  |  79<H>  ----------------------------<  196<H>  4.9   |  15<L>  |  2.01<H>    Ca    9.1      09 Jul 2024 09:02  Phos  3.4     07-09  Mg     1.5     07-09    TPro  5.0<L>  /  Alb  2.4<L>  /  TBili  0.3  /  DBili  x   /  AST  212<H>  /  ALT  133<H>  /  AlkPhos  269<H>  07-09    PT/INR - ( 09 Jul 2024 10:22 )   PT: 17.0 sec;   INR: 1.57 ratio         PTT - ( 09 Jul 2024 10:22 )  PTT:30.3 sec            PHYSICAL EXAMINATION:    T(C): 36.3  HR: 114  BP: 92/54  RR: 16  SpO2: 100%    Constitutional: NAD    Neck:  No JVD  Respiratory: no resp distress, on 2L   Cardiovascular: RRR  Extremities: No peripheral edema  Neurological: A/O x 4, no focal deficits        COMMENTS:    The patient is a suitable candidate for the planned procedure unless box checked [ ]  No, explain:

## 2024-07-09 NOTE — PROGRESS NOTE ADULT - SUBJECTIVE AND OBJECTIVE BOX
[facility]   [phone]  RENAL FOLLOW UP NOTE  --------------------------------------------------------------------------------  Chief Complaint:    24 hour events/subjective:    patient seen and examined.   rapid response this am for melena, hypotension.   reports abd pain, N/V overnight, dizziness    PAST HISTORY  --------------------------------------------------------------------------------  No significant changes to PMH, PSH, FHx, SHx, unless otherwise noted    ALLERGIES & MEDICATIONS  --------------------------------------------------------------------------------  Allergies    No Known Allergies    Intolerances      Standing Inpatient Medications  allopurinol 100 milliGRAM(s) Oral two times a day  ascorbic acid 500 milliGRAM(s) Oral daily  atorvastatin 80 milliGRAM(s) Oral at bedtime  clopidogrel Tablet 75 milliGRAM(s) Oral daily  folic acid 1 milliGRAM(s) Oral daily  lidocaine   4% Patch 1 Patch Transdermal daily  midodrine. 30 milliGRAM(s) Oral three times a day  multivitamin 1 Tablet(s) Oral daily  pantoprazole  Injectable 40 milliGRAM(s) IV Push two times a day  sodium bicarbonate 1300 milliGRAM(s) Oral three times a day  sodium chloride 0.9%. 1000 milliLiter(s) IV Continuous <Continuous>  sotalol. 40 milliGRAM(s) Oral every 24 hours  thiamine 100 milliGRAM(s) Oral daily    PRN Inpatient Medications  acetaminophen     Tablet .. 650 milliGRAM(s) Oral every 6 hours PRN  ondansetron Injectable 4 milliGRAM(s) IV Push every 8 hours PRN      REVIEW OF SYSTEMS  --------------------------------------------------------------------------------    Gen: denie fevers/chills,  CVS: denies chest pain/palpitations  Resp: denies SOB/Cough  GI: Denies +N/+V/+Abd pain  : Denies dysuria    VITALS/PHYSICAL EXAM  --------------------------------------------------------------------------------  T(C): 36.3 (07-09-24 @ 04:55), Max: 37.4 (07-08-24 @ 19:36)  HR: 111 (07-09-24 @ 04:55) (77 - 111)  BP: 95/67 (07-09-24 @ 04:55) (95/67 - 113/70)  RR: 19 (07-09-24 @ 04:55) (18 - 19)  SpO2: 96% (07-09-24 @ 04:55) (96% - 99%)  Wt(kg): --        07-08-24 @ 07:01  -  07-09-24 @ 07:00  --------------------------------------------------------  IN: 1130 mL / OUT: 550 mL / NET: 580 mL      Physical Exam:  	  	Gen: ill-pale appearing,   	Pulm: decrease bs  no rales or ronchi or wheezing  	CV: No JVD. RRR, S1S2; no rub  	Abd: + hypoactive BS, soft, +tender/ softly distended  	: No suprapubic tenderness  	UE: Warm, no cyanosis  no clubbing,  no edema  	LE: Warm, no cyanosis  no clubbing, no edema  	Neuro: alert and oriented. speech coherent     LABS/STUDIES  --------------------------------------------------------------------------------              7.4    16.03 >-----------<  314      [07-09-24 @ 09:02]              22.8     140  |  109  |  79  ----------------------------<  196      [07-09-24 @ 09:02]  4.9   |  15  |  2.01        Ca     9.1     [07-09-24 @ 09:02]      Mg     1.5     [07-09-24 @ 09:02]      Phos  3.4     [07-09-24 @ 09:02]    TPro  5.0  /  Alb  2.4  /  TBili  0.3  /  DBili  x   /  AST  212  /  ALT  133  /  AlkPhos  269  [07-09-24 @ 09:02]    PT/INR: PT 17.0 , INR 1.57       [07-09-24 @ 10:22]  PTT: 30.3       [07-09-24 @ 10:22]      Creatinine Trend:  SCr 2.01 [07-09 @ 09:02]  SCr 1.74 [07-09 @ 07:18]  SCr 1.24 [07-08 @ 07:26]  SCr 1.41 [07-07 @ 07:08]  SCr 0.99 [07-05 @ 07:17]            Urine Creatinine 108      [07-02-24 @ 20:10]  Urine Sodium 17      [07-02-24 @ 20:10]  Urine Urea Nitrogen 730      [07-02-24 @ 20:10]    TSH 2.47      [07-04-24 @ 12:31]

## 2024-07-09 NOTE — CHART NOTE - NSCHARTNOTEFT_GEN_A_CORE
Informed by RN patient hypotensive with BP 70/50 with SOB and pallor. BP dropping to 65/40. Instructed to call RRT. Please refer to RRT note for further details. Attending Dr. Simon was notified and arrived at bedside during RRT. GI fellow at bedside, will work to coordinate EGD today. Patients' daughter updated by Dr Simon. BP improving s/p fluid boluses and PRBC - Patient remained on unit.

## 2024-07-09 NOTE — PROGRESS NOTE ADULT - PROBLEM SELECTOR PLAN 11
DVT ppx: on eliquis for Afib  pend - orthostatics improvement and PT eval pending clinical improvement

## 2024-07-09 NOTE — PROGRESS NOTE ADULT - SUBJECTIVE AND OBJECTIVE BOX
Patient is a 78y old  Male who presents with a chief complaint of hypotension (09 Jul 2024 10:49)      SUBJECTIVE / OVERNIGHT EVENTS: Patient seen and examined at bedside. He had a RRT this morning for hypotension s/p 1 unit PRBC, IVF improved vitals, Overnight + melena, + coffee brown emesis. This morning complaints of abd pain (new started overnight).      ROS:  All other review of systems negative    Allergies    No Known Allergies    Intolerances        MEDICATIONS  (STANDING):  allopurinol 100 milliGRAM(s) Oral two times a day  ascorbic acid 500 milliGRAM(s) Oral daily  atorvastatin 80 milliGRAM(s) Oral at bedtime  clopidogrel Tablet 75 milliGRAM(s) Oral daily  folic acid 1 milliGRAM(s) Oral daily  lidocaine   4% Patch 1 Patch Transdermal daily  magnesium sulfate  IVPB 1 Gram(s) IV Intermittent once  midodrine. 30 milliGRAM(s) Oral three times a day  multivitamin 1 Tablet(s) Oral daily  pantoprazole  Injectable 40 milliGRAM(s) IV Push two times a day  sodium bicarbonate 1300 milliGRAM(s) Oral three times a day  sodium chloride 0.9%. 1000 milliLiter(s) (75 mL/Hr) IV Continuous <Continuous>  sotalol. 40 milliGRAM(s) Oral every 24 hours  thiamine 100 milliGRAM(s) Oral daily    MEDICATIONS  (PRN):  acetaminophen     Tablet .. 650 milliGRAM(s) Oral every 6 hours PRN Temp greater or equal to 38C (100.4F), Mild Pain (1 - 3)  ondansetron Injectable 4 milliGRAM(s) IV Push every 8 hours PRN Nausea and/or Vomiting      Vital Signs Last 24 Hrs  T(C): 36.3 (09 Jul 2024 04:55), Max: 37.4 (08 Jul 2024 19:36)  T(F): 97.4 (09 Jul 2024 04:55), Max: 99.4 (08 Jul 2024 19:36)  HR: 111 (09 Jul 2024 04:55) (77 - 111)  BP: 95/67 (09 Jul 2024 04:55) (95/67 - 113/70)  BP(mean): --  RR: 19 (09 Jul 2024 04:55) (18 - 19)  SpO2: 96% (09 Jul 2024 04:55) (96% - 99%)    Parameters below as of 09 Jul 2024 04:55  Patient On (Oxygen Delivery Method): room air      CAPILLARY BLOOD GLUCOSE      POCT Blood Glucose.: 157 mg/dL (09 Jul 2024 08:39)    I&O's Summary    08 Jul 2024 07:01  -  09 Jul 2024 07:00  --------------------------------------------------------  IN: 1130 mL / OUT: 550 mL / NET: 580 mL        PHYSICAL EXAM:  GENERAL: NAD, well-developed+2L NC  HEAD:  Atraumatic, Normocephalic  EYES: EOMI, PERRLA, conjunctiva and sclera clear  NECK: Supple, No JVD  CHEST/LUNG: Clear to auscultation bilaterally; No wheeze  HEART: Regular rate and rhythm; No murmurs, rubs, or gallops  ABDOMEN: Soft, + TTP diffuse, Nondistended; Bowel sounds present  EXTREMITIES:  2+ Peripheral Pulses, No clubbing, cyanosis, or edema      LABS:                        7.4    16.03 )-----------( 314      ( 09 Jul 2024 09:02 )             22.8     07-09    140  |  109<H>  |  79<H>  ----------------------------<  196<H>  4.9   |  15<L>  |  2.01<H>    Ca    9.1      09 Jul 2024 09:02  Phos  3.4     07-09  Mg     1.5     07-09    TPro  5.0<L>  /  Alb  2.4<L>  /  TBili  0.3  /  DBili  x   /  AST  212<H>  /  ALT  133<H>  /  AlkPhos  269<H>  07-09    PT/INR - ( 09 Jul 2024 10:22 )   PT: 17.0 sec;   INR: 1.57 ratio         PTT - ( 09 Jul 2024 10:22 )  PTT:30.3 sec      Urinalysis Basic - ( 09 Jul 2024 09:02 )    Color: x / Appearance: x / SG: x / pH: x  Gluc: 196 mg/dL / Ketone: x  / Bili: x / Urobili: x   Blood: x / Protein: x / Nitrite: x   Leuk Esterase: x / RBC: x / WBC x   Sq Epi: x / Non Sq Epi: x / Bacteria: x        RADIOLOGY & ADDITIONAL TESTS:      Care Discussed with Consultants/Other Providers: GI team     Case Discussed Patient's Dtr Raiza over the phone in detail

## 2024-07-09 NOTE — PROGRESS NOTE ADULT - ASSESSMENT
79 yo male with PMH of CAD s/p PCI x3 with most recent stent 6/12/24 on Plavix, chronic Afib on eliquis, orthostatic hypotension on midodrine, PAD, 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 Patient states since discharge on this past Saturday, he continued to feel ongoing generalized weakness and dizziness with positional changes despite compliance with home midodrine 15mg TID and holding torsemide as instructed as of day PTA . Admits to poor PO intake of fluids/solute due to ongoing issues with poor appetite and nausea with meals which is not new. In the ED, vitals notable for SBP 92-11s. Labs notable for elevated BUN/Cr 31/1.59 (from 30/1.18 on day of discharge 6/29/24). CXR with clear lungs. Abd US with innumerable intrahepatic echogenic masses consistent with known metastatic neuroendocrine tumor. Admitted to medicine for symptomatic hypotension and RAZIA.      1- RAZIA   2- orthostatic hypotension   3- hx chf   4- pericarditis   5- diarrhea       RAZIA in setting of hypotension, anemia leading to ischemic ATN  creatinine is worsening.   now with GIB, abd pain, n/v, d/w with GI and patient is in need of CT A/P with IV contrast to eval for perforation prior to endoscopic intervention.   given the urgency, may proceed with CT, and he will be at risk for worsening creatinine.   will start NS @ 75 ml/hr  monitor creatinine closely  hypotension, midodrine 30 mg tid  acidosis, with elevated mild lactate  continue sodium bicarb tabs 1300 mg tid  can sotalol be changed to another agent ?   strict I/O  monitor creatinine closely

## 2024-07-09 NOTE — PRE-ANESTHESIA EVALUATION ADULT - NSRADCARDRESULTSFT_GEN_ALL_CORE
< from: TRACY W or WO Ultrasound Enhancing Agent (10.31.23 @ 09:34) >    CONCLUSIONS:     1. Left ventricular systolic function is normal with an ejection fraction visually estimated at 60 to 65 %.   2. Normal right ventricular cavity size, wall thickness, and systolic function.   3. No evidence of left atrial or left atrial appendage thrombus. The left atrial appendage emptying velocity is low at 24 cm/s.   4. Minimal (grade 1) spontaneous echo contrast located in the left atrial apendage.   5. Mild mitral regurgitation. The mitral regurgitant jet is centrally directed. Mechanism of mitral regurgitation: The mechanism of mitral regurgitation: Gabbie Type I (normal leaflet motion with dilated annulus). Blunting of the pulmonary venous flow consistent with elevated left atrial pressure.   6. No pericardial effusion seen.   7. Compared to the transthoracic echocardiogram performed on 10/25/2023 there have been no significant interval changes.    < end of copied text >

## 2024-07-09 NOTE — PROGRESS NOTE ADULT - PROBLEM SELECTOR PLAN 2
- (+) FOBT, drop in h/h (bl ~10> 7.4)  - f/u CBC Q12  - NPO   - Would transfuse <8 given cardiac history.   - Protonix 40mg IV BID  - CT A/P w/ IV contrast (expedited)   - GI consulted: poss plan for EGD today pending Ct A/P  - Discontinued Eliquis   - c/w plavix for now given recent stent

## 2024-07-09 NOTE — PROGRESS NOTE ADULT - PROBLEM SELECTOR PLAN 5
- superimposed on CKD2. 2/2 GIB now monitor closely, IVF and PRBC as needed  - continue to hold home Torsemide and Jardiance  - US abd negative for hydro  - renally dose meds to GFR, avoid nephrotoxic agents

## 2024-07-09 NOTE — PROCEDURE NOTE - PROCEDURE FINDINGS AND DETAILS
Successful mesenteric angiography with gastroduodenal artery (GDA) coil embolization.   Celiac angiography was performed and demonstrated no active extravasation. Roadmap was used to subselect the GDA.   Empiric coil embolization performed of the GDA.   Post-embolization angiography demonstrates no further opacification of the GDA.  Patient tolerated the procedure well with no immediate complication.  Right groin common femoral artery access hemostasis achieved with Celt closure device.

## 2024-07-09 NOTE — RAPID RESPONSE TEAM SUMMARY - NSSITUATIONBACKGROUNDRRT_GEN_ALL_CORE
79 yo male with PMH of CAD s/p PCI x3 with most recent stent 6/12/24 on Plavix, chronic Afib on Eliquis, orthostatic hypotension on midodrine, PAD, metastatic pancreatic neuroendocrine tumor, admitted for orthostatic hypotension. Course complicated by melena as of 07/08 PM. RRT called for hypotension.     Upon arrival, difficulty acquiring initial BP w/ reading as low as 40/20 w/ subsequent readings in 90-100s/70s. Otherwise, pt afebrile, tachycardic to 110s, RR >95% on 2LNC, and T ~98. Pt with pallor and sudhakar melanotic stool on exam as well as epigastric tenderness to palpation - mentation at baseline. Eliquis (last dose 07/08 AM) and maintained on plavix i/s/o recent stent. Per primary team at bedside, GI eval and CT abdomen pending. Pt administered 1u pRBCs and BP improved to 100/60s-70s and tachycardia improved to <100.

## 2024-07-09 NOTE — PROGRESS NOTE ADULT - ASSESSMENT
79 yo male with PMH of CAD s/p PCI x3 with most recent stent 6/12/24 on Plavix, chronic Afib on eliquis, orthostatic hypotension on midodrine, PAD, 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    1. Pancreatic NET- metastatic   -- was on lanreotide then had POD in liver and started on peptide receptor radiotherapy (PRRT)- typically given every 8 weeks for 4 doses. He received one dose on 10/20/2023 and planned to get his 2nd dose at the end of December however his course was complicated by multiple hospitalizations that were noncancer related (decompensated heart failure).   -- 5/28/24 PET Dotatate (compared to 9/2023): several new somatostatin avid osseous lesions, some faintly sclerotic, suspicious for mets. Increased upper RP nodes, probably metastatic. Decreased intensely tracer avid right supradiaphragmatic and upper abdominal nodes, suspicious for metastasis. Redemonstrated intensely somatostatin avid bilobar hepatic lesions, consistent with metastases again morphologically difficult to delineate.   -- f/u with Dr. Sophia Prasad at [facility] after discharge, no plan for treatment inpatient     2. Hypotension   - Monitoring orthostatics  - Follow up cardiology recommendations  - Per endocrine, adrenal insufficiency ruled out given AM cortisol of 17, continues midodrine  - RRT 7/9 for Hypotension, shortness of breath.     3. Diarrhea, + FOBT  - Continues IV fluids  - GI PCR indeterminate for norovirus, pending repeat  - Chromogranin level pending  - Once infection ruled out, may start octreotide 150 mcg BID  - GI eval, FOBT +. CT pending, if no perforation will perform endoscopy today  - Hemoglobin trending down, r/o bleed. Recommend transfuse for hgb < 7    Hugo Topete PA-C  Hematology/Oncology  [facility]  [phone] (office)

## 2024-07-09 NOTE — PRE-ANESTHESIA EVALUATION ADULT - NSANTHPMHFT_GEN_ALL_CORE
Pt states that currently he has no CP, but has some SOB. Able to walk several blocks but with increased effort. Last cardiac stents were placed 4 weeks ago. Denies issues with anesthesia. Pt had RRT on the floor for anemia and hypotension.

## 2024-07-10 NOTE — PROGRESS NOTE ADULT - SUBJECTIVE AND OBJECTIVE BOX
Interval Events:   EGD done yesterday with active bleed seen in duodenum with large clot and unable to localize and adequately treat bleed endoscopically. Given 4 units PRBC during the procedure with improvement in Hgb to 11. Required amiodarone loading for AFib with RVR during the procedure and phenylepherine for hypotension. Patient taken to IR for embolization of gastroduodenal artery.     After procedure, patient taken to ICU for further care. Patient remains intubated and on levophed for hypotension. Labs notable for stable Hgb at 12 and rising Cr to 2.5 with BUN of 84 and AST 1936 and .     ROS:   12 point review of systems performed and negative except otherwise noted in HPI.    Hospital Medications:  acetaminophen     Tablet .. 650 milliGRAM(s) Oral every 6 hours PRN  allopurinol 100 milliGRAM(s) Oral two times a day  ascorbic acid 500 milliGRAM(s) Oral daily  atorvastatin 80 milliGRAM(s) Oral at bedtime  chlorhexidine 0.12% Liquid 15 milliLiter(s) Oral Mucosa every 12 hours  clopidogrel Tablet 75 milliGRAM(s) Enteral Tube daily  dexMEDEtomidine Infusion 0.5 MICROgram(s)/kG/Hr IV Continuous <Continuous>  fentaNYL    Injectable 50 MICROGram(s) IV Push every 4 hours PRN  folic acid 1 milliGRAM(s) Oral daily  lidocaine   4% Patch 1 Patch Transdermal daily  LORazepam   Injectable 2 milliGRAM(s) IV Push every 4 hours PRN  magnesium sulfate  IVPB 2 Gram(s) IV Intermittent once  multivitamin 1 Tablet(s) Oral daily  norepinephrine Infusion 0.05 MICROgram(s)/kG/Min IV Continuous <Continuous>  ondansetron Injectable 4 milliGRAM(s) IV Push every 8 hours PRN  pantoprazole Infusion 8 mG/Hr IV Continuous <Continuous>  phenylephrine    Infusion 0.8 MICROgram(s)/kG/Min IV Continuous <Continuous>  sodium bicarbonate 1300 milliGRAM(s) Oral three times a day  thiamine 100 milliGRAM(s) Oral daily      PHYSICAL EXAM:   Vital Signs:  Vital Signs Last 24 Hrs  T(C): 36.7 (10 Jul 2024 12:00), Max: 39.2 (09 Jul 2024 23:00)  T(F): 98.1 (10 Jul 2024 12:00), Max: 102.6 (09 Jul 2024 23:00)  HR: 80 (10 Jul 2024 13:45) (60 - 118)  BP: --  BP(mean): --  RR: 22 (10 Jul 2024 13:45) (17 - 34)  SpO2: 99% (10 Jul 2024 13:45) (98% - 100%)    Parameters below as of 10 Jul 2024 08:00  Patient On (Oxygen Delivery Method): ventilator      Daily     Daily     GENERAL: intubated, sedated  HEENT: NCAT, no conjunctival pallor appreciated  CHEST: mechanical breath sounds noted  CARDIAC: regular rate, +S1/S2  ABDOMEN: soft, diffusely tender to even mild touch, mildly distended  EXTREMITIES: warm, well perfused  SKIN: no lesions noted    LABS: reviewed                        11.8   10.61 )-----------( 175      ( 10 Jul 2024 12:43 )             33.8     07-10    143  |  109<H>  |  85<H>  ----------------------------<  138<H>  4.0   |  15<L>  |  2.75<H>    Ca    8.9      10 Jul 2024 09:50  Phos  5.9     07-10  Mg     1.6     07-10    TPro  5.3<L>  /  Alb  2.5<L>  /  TBili  0.5  /  DBili  x   /  AST  2867<H>  /  ALT  1093<H>  /  AlkPhos  260<H>  07-10    LIVER FUNCTIONS - ( 10 Jul 2024 09:50 )  Alb: 2.5 g/dL / Pro: 5.3 g/dL / ALK PHOS: 260 U/L / ALT: 1093 U/L / AST: 2867 U/L / GGT: x             Interval Diagnostic Studies: see sunrise for full report

## 2024-07-10 NOTE — CONSULT NOTE ADULT - ASSESSMENT
77 yo male with PMH of CAD s/p PCI x3 with most recent stent 6/12/24 on Plavix and eliquis , chronic Afib on eliquis, orthostatic hypotension on midodrine, PAD, neuroendocrine tumor with RT currently on hold, recent admission for dx cath on 6/24/24 who presents from PCP's office for hypotension despite taking AM midodrine dose on 7/2, admitted to medicine for symptomatic hypotension and RAZIA. Per chart, on 7/8 PM, pt was noted to have acute onset of melena x5 and coffee ground emesis x2-3. RRT was called on 7/9 AM for hypotension, hgb dropped from 9.2 to 7.4 (admission baseline 10-11), FOBT +, pt was started on PPI IV and transfused 1 unit of pRBC. Pt subsequently went for EGD and during EGD, pt became hypotensive and was given phenyephrine, had A-fib with RVR s/p amiodarone. Now s/p IR embolization, admitted to MICU for further moniring i.s.o hemorrhagic shock 2/2 GI bleed. Palliative team consulted for complex decision making in setting of advanced illness and guarded prognosis.

## 2024-07-10 NOTE — PROGRESS NOTE ADULT - SUBJECTIVE AND OBJECTIVE BOX
[facility]   [phone]  RENAL FOLLOW UP NOTE  --------------------------------------------------------------------------------  Chief Complaint:    24 hour events/subjective:    patient seen and examined.   intubated    PAST HISTORY  --------------------------------------------------------------------------------  No significant changes to PMH, PSH, FHx, SHx, unless otherwise noted    ALLERGIES & MEDICATIONS  --------------------------------------------------------------------------------  Allergies    No Known Allergies    Intolerances      Standing Inpatient Medications  allopurinol 100 milliGRAM(s) Oral two times a day  ascorbic acid 500 milliGRAM(s) Oral daily  atorvastatin 80 milliGRAM(s) Oral at bedtime  cangrelor Infusion 0.75 MICROgram(s)/kG/Min IV Continuous <Continuous>  chlorhexidine 0.12% Liquid 15 milliLiter(s) Oral Mucosa every 12 hours  clopidogrel Tablet 75 milliGRAM(s) Enteral Tube daily  dexMEDEtomidine Infusion 0.5 MICROgram(s)/kG/Hr IV Continuous <Continuous>  folic acid 1 milliGRAM(s) Oral daily  lidocaine   4% Patch 1 Patch Transdermal daily  multivitamin 1 Tablet(s) Oral daily  norepinephrine Infusion 0.05 MICROgram(s)/kG/Min IV Continuous <Continuous>  pantoprazole Infusion 8 mG/Hr IV Continuous <Continuous>  phenylephrine    Infusion 0.8 MICROgram(s)/kG/Min IV Continuous <Continuous>  sodium bicarbonate 1300 milliGRAM(s) Oral three times a day  thiamine 100 milliGRAM(s) Oral daily    PRN Inpatient Medications  acetaminophen     Tablet .. 650 milliGRAM(s) Oral every 6 hours PRN  fentaNYL    Injectable 50 MICROGram(s) IV Push every 4 hours PRN  LORazepam   Injectable 2 milliGRAM(s) IV Push every 4 hours PRN  ondansetron Injectable 4 milliGRAM(s) IV Push every 8 hours PRN      REVIEW OF SYSTEMS  --------------------------------------------------------------------------------    patient is unable to give ROS    VITALS/PHYSICAL EXAM  --------------------------------------------------------------------------------  T(C): 36.7 (07-10-24 @ 12:00), Max: 39.2 (07-09-24 @ 23:00)  HR: 80 (07-10-24 @ 13:45) (60 - 118)  BP: --  RR: 22 (07-10-24 @ 13:45) (17 - 34)  SpO2: 99% (07-10-24 @ 13:45) (98% - 100%)  Wt(kg): --  Height (cm): 188 (07-09-24 @ 13:27)  Weight (kg): 98.7 (07-09-24 @ 13:27)  BMI (kg/m2): 27.9 (07-09-24 @ 13:27)  BSA (m2): 2.25 (07-09-24 @ 13:27)      07-09-24 @ 07:01  -  07-10-24 @ 07:00  --------------------------------------------------------  IN: 1173.5 mL / OUT: 335 mL / NET: 838.5 mL    07-10-24 @ 07:01  -  07-10-24 @ 14:02  --------------------------------------------------------  IN: 2701.6 mL / OUT: 60 mL / NET: 2641.6 mL      Physical Exam:  	  	Gen: intubated, sedated  	Pulm: decrease bs  no rales or ronchi or wheezing  	CV: No JVD. RRR, S1S2; no rub  	Abd: no BS, softly distended  	: daysi  	UE: Warm, no cyanosis  no clubbing,  no edema  	LE: Warm, no cyanosis  no clubbing, no edema    LABS/STUDIES  --------------------------------------------------------------------------------              11.8   10.61 >-----------<  175      [07-10-24 @ 12:43]              33.8     143  |  109  |  85  ----------------------------<  138      [07-10-24 @ 09:50]  4.0   |  15  |  2.75        Ca     8.9     [07-10-24 @ 09:50]      Mg     1.6     [07-10-24 @ 09:50]      Phos  5.9     [07-10-24 @ 09:50]    TPro  5.3  /  Alb  2.5  /  TBili  0.5  /  DBili  x   /  AST  2867  /  ALT  1093  /  AlkPhos  260  [07-10-24 @ 09:50]    PT/INR: PT 14.5 , INR 1.33       [07-10-24 @ 00:16]  PTT: 30.1       [07-10-24 @ 00:16]    CK 3042      [07-10-24 @ 09:50]    Creatinine Trend:  SCr 2.75 [07-10 @ 09:50]  SCr 2.50 [07-10 @ 00:16]  SCr 2.07 [07-09 @ 18:36]  SCr 2.01 [07-09 @ 09:02]  SCr 1.74 [07-09 @ 07:18]            TSH 2.47      [07-04-24 @ 12:31]

## 2024-07-10 NOTE — PROGRESS NOTE ADULT - ASSESSMENT
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     Impression:  #Duodenal bleed s/p GDA embolization  #Acute on chronic blood loss anemia  #Renal failure with azotemia  #Neuroendocrine tumor with mets to liver  #Shock Liver  Developed acute onset of melena and coffee ground emesis on 7/9 with >2 point drop in Hgb in 7 hrs with accompanied azotemia. Found to have active bleed in second portion of duodenum, unable to identify source underneath large clot on EGD. Possible that source is from bleed from duodenal NE tumor vs duodenal ulcer. Now s/p empiric embolization of GDA by IR for bleeding control    Recommendations:  - Continue pantoprazole IV BID  - Transfuse for Hgb<8, trend cbc q6h, maintain active T&S, trend INR  - If any further melena or further drop in Hgb, please message GI team  - Risk benefit decision per primary team and cardiology regarding antiplatelet agent and AC given concern for stent thrombosis   - Keep patient NPO. Ok to place dobhoff if needed for medications  - Levophed to maintain adequate perfusion   - Trend liver tests daily    All recommendations are tentative until note is attested by attending.     Bailey Choudhury, PGY4  Gastroenterology/Hepatology Fellow  Available on [employer] Teams  [phone] (Long Range Pager)  [phone] (Short Range Pager [facility])    After 5 pm, please contact the on-call GI fellow for any urgent issues via the Hospital Call

## 2024-07-10 NOTE — CONSULT NOTE ADULT - SUBJECTIVE AND OBJECTIVE BOX
Date of Service: 07-10-24 @ 13:12    HPI:  79 yo male with PMH of CAD s/p PCI x3 with most recent stent 6/12/24 on Plavix, chronic Afib on eliquis, orthostatic hypotension on midodrine, PAD, 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 Patient states since discharge on this past Saturday, he continued to feel ongoing generalized weakness and dizziness with positional changes despite compliance with home midodrine 15mg TID and holding torsemide as instructed. Admits to poor PO intake of fluids/solute due to ongoing issues with poor appetite and nausea with meals which is not new. Denies any fever, chills, chest pain, SOB, cough, abd pain, dysuria nor urinary frequency. Has chronic diarrhea that is unchanged from his baseline.     In the ED, vitals notable for SBP 92-11s. Labs notable for elevated BUN/Cr 31/1.59 (from 30/1.18 on day of discharge 6/29/24). CXR with clear lungs. Abd US with innumerable intrahepatic echogenic masses consistent with known metastatic neuroendocrine tumor. Admitted to medicine for symptomatic hypotension and RAZIA. (02 Jul 2024 18:34)    PERTINENT PM/SXH:   Hypertension    Hypercholesterolemia    PAD (peripheral artery disease)    Neuroendocrine carcinoma    Hepatic carcinoma    Atrial fibrillation and flutter    CAD (coronary artery disease)      S/P knee replacement, bilateral    S/P hip replacement    History of hernia repair    H/O laminectomy    History of lumbosacral spine surgery    History of cardioversion      FAMILY HISTORY:      SOCIAL HISTORY:   Significant other/partner[ ]  Children[ ]  Bahai/Spirituality:  Substance hx:  [ ]   Tobacco hx:  [ ]   Alcohol hx: [ ]   Home Opioid hx:  [ ] I-Stop Reference No:  Living Situation: [ ]Home  [ ]Long term care  [ ]Rehab [ ]Other    ADVANCE DIRECTIVES:    DNR/MOLST  [ ]  Living Will  [ ]   DECISION MAKER(s):  [ ] Health Care Proxy(s)  [ ] Surrogate(s)  [ ] Guardian           Name(s): Phone Number(s):    BASELINE (I)ADL(s) (prior to admission):  [place]: [ ]Total  [ ] Moderate [ ]Dependent    Allergies    No Known Allergies    Intolerances    MEDICATIONS  (STANDING):  allopurinol 100 milliGRAM(s) Oral two times a day  ascorbic acid 500 milliGRAM(s) Oral daily  atorvastatin 80 milliGRAM(s) Oral at bedtime  chlorhexidine 0.12% Liquid 15 milliLiter(s) Oral Mucosa every 12 hours  clopidogrel Tablet 75 milliGRAM(s) Enteral Tube daily  dexMEDEtomidine Infusion 0.5 MICROgram(s)/kG/Hr (12.3 mL/Hr) IV Continuous <Continuous>  folic acid 1 milliGRAM(s) Oral daily  lactated ringers Bolus 1000 milliLiter(s) IV Bolus once  lidocaine   4% Patch 1 Patch Transdermal daily  multivitamin 1 Tablet(s) Oral daily  norepinephrine Infusion 0.05 MICROgram(s)/kG/Min (9.25 mL/Hr) IV Continuous <Continuous>  pantoprazole Infusion 8 mG/Hr (10 mL/Hr) IV Continuous <Continuous>  phenylephrine    Infusion 0.8 MICROgram(s)/kG/Min (29.6 mL/Hr) IV Continuous <Continuous>  sodium bicarbonate 1300 milliGRAM(s) Oral three times a day  thiamine 100 milliGRAM(s) Oral daily    MEDICATIONS  (PRN):  acetaminophen     Tablet .. 650 milliGRAM(s) Oral every 6 hours PRN Temp greater or equal to 38C (100.4F), Mild Pain (1 - 3)  fentaNYL    Injectable 50 MICROGram(s) IV Push every 4 hours PRN Severe Pain (7 - 10)  LORazepam   Injectable 2 milliGRAM(s) IV Push every 4 hours PRN Agitation  ondansetron Injectable 4 milliGRAM(s) IV Push every 8 hours PRN Nausea and/or Vomiting      ITEMS NOT CHECKED ARE NOT PRESENT  PRESENT SYMPTOMS: [ ]Unable to self-report see CPOT, PAINADs, RDOS  Source if other than patient:  [ ]Family   [ ]Team     Pain: [ ]yes [ ]no  QOL impact -   Location -                    Aggravating factors -  Quality -  Radiation -  Timing-  Severity (0-10 scale):  Minimal acceptable level (0-10 scale):       Dyspnea:                           [ ]Mild [ ]Moderate [ ]Severe  Anxiety:                             [ ]Mild [ ]Moderate [ ]Severe  Fatigue:                             [ ]Mild [ ]Moderate [ ]Severe  Nausea:                             [ ]Mild [ ]Moderate [ ]Severe  Loss of appetite:              [ ]Mild [ ]Moderate [ ]Severe  Constipation:                    [ ]Mild [ ]Moderate [ ]Severe    PCSSQ [Palliative Care Spiritual Screening Question]   Severity (0-10):  Score of 4 or > indicate consideration of Chaplaincy referral.  Chaplaincy Referral: [ ] yes [ ] refused [ ] following [ ] deferred    Caregiver [place]? : [ ] yes [ ] no [ ] deferred:  Social work referral [ ] Patient & Family Centered Care Referral [ ]     Anticipatory Grief Present?: [ ] yes [ ] no  [ ] deferred: Palliative Social work referral [ ]  Patient & Family Centered Care Referral [ ]       Other Symptoms:  [ ]All other review of systems negative   [ ] Unable to obtain due to poor mentation    PHYSICAL EXAM:  Vital Signs Last 24 Hrs  T(C): 36.7 (10 Jul 2024 12:00), Max: 39.2 (09 Jul 2024 23:00)  T(F): 98.1 (10 Jul 2024 12:00), Max: 102.6 (09 Jul 2024 23:00)  HR: 81 (10 Jul 2024 12:45) (60 - 118)  BP: 92/54 (09 Jul 2024 13:27) (92/54 - 92/54)  BP(mean): 71 (09 Jul 2024 13:27) (71 - 71)  RR: 29 (10 Jul 2024 12:45) (16 - 34)  SpO2: 100% (10 Jul 2024 12:45) (98% - 100%)    Parameters below as of 10 Jul 2024 08:00  Patient On (Oxygen Delivery Method): ventilator     I&O's Summary    09 Jul 2024 07:01  -  10 Jul 2024 07:00  --------------------------------------------------------  IN: 1173.5 mL / OUT: 335 mL / NET: 838.5 mL    10 Jul 2024 07:01  -  10 Jul 2024 13:12  --------------------------------------------------------  IN: 457.5 mL / OUT: 50 mL / NET: 407.5 mL        GENERAL:  [x]Alert  [x]Oriented x 3  [ ]Lethargic  [ ]Cachexia  [ ]Unarousable  [x]Verbal  [ ]Non-Verbal  Behavioral:   [ ]Anxiety  [ ]Delirium [ ]Agitation [ ]Other  HEENT:  [x]Normal   [ ]Dry mouth   [ ]ET Tube/Trach  [ ]Oral lesions  PULMONARY:   [x]Clear [ ]Tachypnea  [ ]Audible excessive secretions   [ ]Rhonchi        [ ]Right [ ]Left [ ]Bilateral  [ ]Crackles        [ ]Right [ ]Left [ ]Bilateral  [ ]Wheezing     [ ]Right [ ]Left [ ]Bilateral  [ ]Diminished BS [ ] Right [ ]Left [ ]Bilateral  CARDIOVASCULAR:    [x]Regular [ ]Irregular [ ]Tachy  [ ]Lavelle [ ]Murmur [ ]Other  GASTROINTESTINAL:  [x]Soft  [ ]Distended   [x]+BS  [x]Non tender [ ]Tender  [ ]PEG [ ]OGT/ NGT   Last BM:    GENITOURINARY:  [x]Normal [ ]Incontinent   [ ]Oliguria/Anuria   [ ]Ivory  MUSCULOSKELETAL:   [ ]Normal   [x]Weakness  [ ]Bed/Wheelchair bound [ ]Edema  NEUROLOGIC:   [x]No focal deficits  [ ] Cognitive impairment  [ ] Dysphagia [ ]Dysarthria [ ] Paresis [ ]Other   SKIN:   [x]Normal  [ ]Rash   [ ]Pressure ulcer(s) [ ]y [ ]n present on admission    CRITICAL CARE:  [ ] Shock Present  [ ]Septic [ ]Cardiogenic [ ]Neurologic [ ]Hypovolemic  [ ]  Vasopressors [ ]  Inotropes   [ ]Respiratory failure present [ ]Mechanical ventilation [ ]Non-invasive ventilatory support [ ]High flow  Mode: AC/ CMV (Assist Control/ Continuous Mandatory Ventilation), RR (machine): 16, TV (machine): 450, FiO2: 40, PEEP: 5, ITime: 0.7, MAP: 10, PIP: 22  [ ]Acute  [ ]Chronic [ ]Hypoxic  [ ]Hypercarbic [ ]Other  [ ]Other organ failure     LABS:                        11.8   10.61 )-----------( 175      ( 10 Jul 2024 12:43 )             33.8   07-10    143  |  109<H>  |  85<H>  ----------------------------<  138<H>  4.0   |  15<L>  |  2.75<H>    Ca    8.9      10 Jul 2024 09:50  Phos  5.9     07-10  Mg     1.6     07-10    TPro  5.3<L>  /  Alb  2.5<L>  /  TBili  0.5  /  DBili  x   /  AST  2867<H>  /  ALT  1093<H>  /  AlkPhos  260<H>  07-10  PT/INR - ( 10 Jul 2024 00:16 )   PT: 14.5 sec;   INR: 1.33 ratio         PTT - ( 10 Jul 2024 00:16 )  PTT:30.1 sec    Urinalysis Basic - ( 10 Jul 2024 09:50 )    Color: x / Appearance: x / SG: x / pH: x  Gluc: 138 mg/dL / Ketone: x  / Bili: x / Urobili: x   Blood: x / Protein: x / Nitrite: x   Leuk Esterase: x / RBC: x / WBC x   Sq Epi: x / Non Sq Epi: x / Bacteria: x      RADIOLOGY & ADDITIONAL STUDIES:    PROTEIN CALORIE MALNUTRITION PRESENT: [ ]mild [ ]moderate [ ]severe [ ]underweight [ ]morbid obesity  https://www.andeal.org/vault/2440/web/files/ONC/Table_Clinical%20Characteristics%20to%20Document%20Malnutrition-White%20JV%20et%20al%141448.pdf    Height (cm): 188 (07-09-24 @ 13:27), 188 (06-24-24 @ 13:43), 188 (06-12-24 @ 11:58)  Weight (kg): 98.7 (07-09-24 @ 13:27), 102.5 (06-24-24 @ 13:43), 102.1 (06-12-24 @ 11:58)  BMI (kg/m2): 27.9 (07-09-24 @ 13:27), 29 (06-24-24 @ 13:43), 28.9 (06-12-24 @ 11:58)    [ ]PPSV2 < or = to 30% [ ]significant weight loss  [ ]poor nutritional intake  [ ]anasarca[ ]Artificial Nutrition      Other REFERRALS:  [ ]Hospice  [ ]Child Life  [ ]Social Work  [ ]Case management [ ]Holistic Therapy    Date of Service: 07-10-24 @ 13:12    HPI:  77 yo male with PMH of CAD s/p PCI x3 with most recent stent 6/12/24 on Plavix, chronic Afib on eliquis, orthostatic hypotension on midodrine, PAD, 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 Patient states since discharge on this past Saturday, he continued to feel ongoing generalized weakness and dizziness with positional changes despite compliance with home midodrine 15mg TID and holding torsemide as instructed. Admits to poor PO intake of fluids/solute due to ongoing issues with poor appetite and nausea with meals which is not new. Denies any fever, chills, chest pain, SOB, cough, abd pain, dysuria nor urinary frequency. Has chronic diarrhea that is unchanged from his baseline.     In the ED, vitals notable for SBP 92-11s. Labs notable for elevated BUN/Cr 31/1.59 (from 30/1.18 on day of discharge 6/29/24). CXR with clear lungs. Abd US with innumerable intrahepatic echogenic masses consistent with known metastatic neuroendocrine tumor. Admitted to medicine for symptomatic hypotension and RAZIA. (02 Jul 2024 18:34)    PERTINENT PM/SXH:   Hypertension    Hypercholesterolemia    PAD (peripheral artery disease)    Neuroendocrine carcinoma    Hepatic carcinoma    Atrial fibrillation and flutter    CAD (coronary artery disease)    S/P knee replacement, bilateral    S/P hip replacement    History of hernia repair    H/O laminectomy    History of lumbosacral spine surgery    History of cardioversion      FAMILY HISTORY: unable to obtain due to sedation      SOCIAL HISTORY:   Significant other/partner[ x]  Children[x ]  Latter-day/Spirituality:  Substance hx:  [ ]   Tobacco hx:  [ ]   Alcohol hx: [ ]   Home Opioid hx:  [ ] I-Stop Reference No:  Living Situation: [x ]Home  [ ]Long term care  [ ]Rehab [ ]Other    ADVANCE DIRECTIVES:    DNR/MOLST  [ ]  Living Will  [ ]   DECISION MAKER(s):  [ ] Health Care Proxy(s)  [ x] Surrogate(s)  [ ] Guardian           Name(s): Phone Number(s):  Yamilet (spouse) [phone]  BASELINE (I)ADL(s) (prior to admission):  [place]: [ ]Total  [x ] Moderate [ ]Dependent    Allergies    No Known Allergies    Intolerances    MEDICATIONS  (STANDING):  allopurinol 100 milliGRAM(s) Oral two times a day  ascorbic acid 500 milliGRAM(s) Oral daily  atorvastatin 80 milliGRAM(s) Oral at bedtime  chlorhexidine 0.12% Liquid 15 milliLiter(s) Oral Mucosa every 12 hours  clopidogrel Tablet 75 milliGRAM(s) Enteral Tube daily  dexMEDEtomidine Infusion 0.5 MICROgram(s)/kG/Hr (12.3 mL/Hr) IV Continuous <Continuous>  folic acid 1 milliGRAM(s) Oral daily  lactated ringers Bolus 1000 milliLiter(s) IV Bolus once  lidocaine   4% Patch 1 Patch Transdermal daily  multivitamin 1 Tablet(s) Oral daily  norepinephrine Infusion 0.05 MICROgram(s)/kG/Min (9.25 mL/Hr) IV Continuous <Continuous>  pantoprazole Infusion 8 mG/Hr (10 mL/Hr) IV Continuous <Continuous>  phenylephrine    Infusion 0.8 MICROgram(s)/kG/Min (29.6 mL/Hr) IV Continuous <Continuous>  sodium bicarbonate 1300 milliGRAM(s) Oral three times a day  thiamine 100 milliGRAM(s) Oral daily    MEDICATIONS  (PRN):  acetaminophen     Tablet .. 650 milliGRAM(s) Oral every 6 hours PRN Temp greater or equal to 38C (100.4F), Mild Pain (1 - 3)  fentaNYL    Injectable 50 MICROGram(s) IV Push every 4 hours PRN Severe Pain (7 - 10)  LORazepam   Injectable 2 milliGRAM(s) IV Push every 4 hours PRN Agitation  ondansetron Injectable 4 milliGRAM(s) IV Push every 8 hours PRN Nausea and/or Vomiting      ITEMS NOT CHECKED ARE NOT PRESENT  PRESENT SYMPTOMS: [x ]Unable to self-report see CPOT, PAINADs, RDOS  Source if other than patient:  [ ]Family   [x ]Team     Pain: [ ]yes [ ]no  QOL impact -   Location -                    Aggravating factors -  Quality -  Radiation -  Timing-  Severity (0-10 scale):  Minimal acceptable level (0-10 scale):       Dyspnea:                           [ ]Mild [ ]Moderate [ ]Severe  Anxiety:                             [ ]Mild [ ]Moderate [ ]Severe  Fatigue:                             [ ]Mild [ ]Moderate [ ]Severe  Nausea:                             [ ]Mild [ ]Moderate [ ]Severe  Loss of appetite:              [ ]Mild [ ]Moderate [ ]Severe  Constipation:                    [ ]Mild [ ]Moderate [ ]Severe    PCSSQ [Palliative Care Spiritual Screening Question]   Severity (0-10):  Score of 4 or > indicate consideration of Chaplaincy referral.  Chaplaincy Referral: [ ] yes [ ] refused [ ] following [x ] deferred    Caregiver [place]? : [ ] yes [ ] no [ x] deferred:  Social work referral [ ] Patient & Family Centered Care Referral [ ]     Anticipatory Grief Present?: [ ] yes [ ] no  [x ] deferred: Palliative Social work referral [ ]  Patient & Family Centered Care Referral [ ]       Other Symptoms:  [ ]All other review of systems negative   [ x] Unable to obtain due to poor mentation    PHYSICAL EXAM:  Vital Signs Last 24 Hrs  T(C): 36.7 (10 Jul 2024 12:00), Max: 39.2 (09 Jul 2024 23:00)  T(F): 98.1 (10 Jul 2024 12:00), Max: 102.6 (09 Jul 2024 23:00)  HR: 81 (10 Jul 2024 12:45) (60 - 118)  BP: 92/54 (09 Jul 2024 13:27) (92/54 - 92/54)  BP(mean): 71 (09 Jul 2024 13:27) (71 - 71)  RR: 29 (10 Jul 2024 12:45) (16 - 34)  SpO2: 100% (10 Jul 2024 12:45) (98% - 100%)    Parameters below as of 10 Jul 2024 08:00  Patient On (Oxygen Delivery Method): ventilator     I&O's Summary    09 Jul 2024 07:01  -  10 Jul 2024 07:00  --------------------------------------------------------  IN: 1173.5 mL / OUT: 335 mL / NET: 838.5 mL    10 Jul 2024 07:01  -  10 Jul 2024 13:12  --------------------------------------------------------  IN: 457.5 mL / OUT: 50 mL / NET: 407.5 mL        GENERAL:  sedated  [ ]Alert  [ ]Oriented x 3  [ ]Lethargic  [ ]Cachexia  [ ]Unarousable  [ ]Verbal  [ ]Non-Verbal  Behavioral:   [ ]Anxiety  [ ]Delirium [ ]Agitation [ ]Other  HEENT:  [ ]Normal   [ ]Dry mouth   [x ]ET Tube/Trach  [ ]Oral lesions  PULMONARY:   [x]Clear [ ]Tachypnea  [ ]Audible excessive secretions   [ ]Rhonchi        [ ]Right [ ]Left [ ]Bilateral  [ ]Crackles        [ ]Right [ ]Left [ ]Bilateral  [ ]Wheezing     [ ]Right [ ]Left [ ]Bilateral  [ ]Diminished BS [ ] Right [ ]Left [ ]Bilateral  CARDIOVASCULAR:    [x]Regular [ ]Irregular [ ]Tachy  [ ]Lavelle [ ]Murmur [ ]Other  GASTROINTESTINAL:  [x]Soft  [ ]Distended   [x]+BS  [x]Non tender [ ]Tender  [ ]PEG [x ]OGT/ NGT   Last BM:    GENITOURINARY:  [ ]Normal [ ]Incontinent   [ ]Oliguria/Anuria   [x ]Ivory  MUSCULOSKELETAL:   [ ]Normal   [x]Weakness  [ ]Bed/Wheelchair bound [ ]Edema  NEUROLOGIC:   sedated  [ ]No focal deficits  [ ] Cognitive impairment  [ ] Dysphagia [ ]Dysarthria [ ] Paresis [ ]Other   SKIN:   [x]Normal  [ ]Rash   [ ]Pressure ulcer(s) [ ]y [ ]n present on admission    CRITICAL CARE:  [ ] Shock Present  [ ]Septic [ ]Cardiogenic [ ]Neurologic [ ]Hypovolemic  [ ]  Vasopressors [ ]  Inotropes   [ x]Respiratory failure present [x ]Mechanical ventilation [ ]Non-invasive ventilatory support [ ]High flow  Mode: AC/ CMV (Assist Control/ Continuous Mandatory Ventilation), RR (machine): 16, TV (machine): 450, FiO2: 40, PEEP: 5, ITime: 0.7, MAP: 10, PIP: 22  [ x]Acute  [ ]Chronic [ x]Hypoxic  [ ]Hypercarbic [ ]Other  [ ]Other organ failure     LABS:                        11.8   10.61 )-----------( 175      ( 10 Jul 2024 12:43 )             33.8   07-10    143  |  109<H>  |  85<H>  ----------------------------<  138<H>  4.0   |  15<L>  |  2.75<H>    Ca    8.9      10 Jul 2024 09:50  Phos  5.9     07-10  Mg     1.6     07-10    TPro  5.3<L>  /  Alb  2.5<L>  /  TBili  0.5  /  DBili  x   /  AST  2867<H>  /  ALT  1093<H>  /  AlkPhos  260<H>  07-10  PT/INR - ( 10 Jul 2024 00:16 )   PT: 14.5 sec;   INR: 1.33 ratio         PTT - ( 10 Jul 2024 00:16 )  PTT:30.1 sec    Urinalysis Basic - ( 10 Jul 2024 09:50 )    Color: x / Appearance: x / SG: x / pH: x  Gluc: 138 mg/dL / Ketone: x  / Bili: x / Urobili: x   Blood: x / Protein: x / Nitrite: x   Leuk Esterase: x / RBC: x / WBC x   Sq Epi: x / Non Sq Epi: x / Bacteria: x      RADIOLOGY & ADDITIONAL STUDIES:  < from: CT Abdomen and Pelvis w/ IV Cont (07.09.24 @ 11:36) >    ACC: [id number] EXAM:  CT ABDOMEN AND PELVIS IC   ORDERED BY: ASHLEY HESTER     PROCEDURE DATE:  07/09/2024          INTERPRETATION:  CLINICAL INFORMATION: Trauma, rule out active GI bleeding    COMPARISON: CT chest abdomen pelvis 10/23/2023    CONTRAST/COMPLICATIONS:  IV Contrast: Omnipaque 350  90 cc administered   10 cc discarded  Oral Contrast: NONE  Complications: None reported at time of study completion    PROCEDURE:  CT of the Abdomen and Pelvis was performed.  Precontrast, Arterial and Delayed phases were performed.  Sagittal and coronal reformats were performed.    FINDINGS:  LOWER CHEST: Small left pleural effusion. Small rim of bibasilar   subsegmental atelectasis. Coronary artery calcification    LIVER: Nodular contour of the liver which could represent pseudocirrhosis   in the setting of hepatic metastasis. Size and quantity of metastatic   foci is increased compared to prior.  BILE DUCTS: Normal caliber.  GALLBLADDER: Within normal limits.  SPLEEN: Within normal limits.  PANCREAS: Within normal limits.  ADRENALS: Within normal limits.  KIDNEYS/URETERS: Bilaterally atrophic kidneys. Right renal cyst with   other bilateral renal cystic lesions that are too small to characterize.   Redemonstrated nonobstructive left renal pelvic stone measuring 5 mm.    BLADDER: 2 large stones in the bladder measuring 2.3 x 1.6 cm and 3.6 x   2.6 cm. Stones appear similar in size compared to prior.  REPRODUCTIVE ORGANS: Prostate calcifications.    BOWEL: There is active bleeding within the third portion duodenum which   contains hematoma. Appendix is normal.  PERITONEUM/RETROPERITONEUM: Within normal limits.  VESSELS: Atherosclerotic calcifications.  LYMPH NODES: Few prominent periaortic retroperitoneal lymph nodes. No   lymphadenopathy.  ABDOMINAL WALL: Small left inguinal hernia.  BONES: Multilevel laminectomy and spinal fixation. Status post prior   right total hip arthroplasty.    IMPRESSION:  Active bleeding in the third portion of the duodenum.    Progression of liver metastases.    Findings were discussed with GRETTA Pires by Jani Underwood M.D. on   7/9/2024 at 12:15 PM.    --- End of Report ---    JANI UNDERWOOD MD; Resident Radiologist  This document has been electronically signed.  LORRIE BIRD MD; Attending Radiologist  This document has been electronically signed. Jul 9 2024  1:32PM    < end of copied text >      PROTEIN CALORIE MALNUTRITION PRESENT: [ ]mild [ ]moderate [ ]severe [ ]underweight [ ]morbid obesity  https://www.andeal.org/vault/2440/web/files/ONC/Table_Clinical%20Characteristics%20to%20Document%20Malnutrition-White%20JV%20et%20al%900918.pdf    Height (cm): 188 (07-09-24 @ 13:27), 188 (06-24-24 @ 13:43), 188 (06-12-24 @ 11:58)  Weight (kg): 98.7 (07-09-24 @ 13:27), 102.5 (06-24-24 @ 13:43), 102.1 (06-12-24 @ 11:58)  BMI (kg/m2): 27.9 (07-09-24 @ 13:27), 29 (06-24-24 @ 13:43), 28.9 (06-12-24 @ 11:58)    [ ]PPSV2 < or = to 30% [ ]significant weight loss  [ ]poor nutritional intake  [ ]anasarca[ ]Artificial Nutrition      Other REFERRALS:  [ ]Hospice  [ ]Child Life  [ ]Social Work  [ ]Case management [ ]Holistic Therapy

## 2024-07-10 NOTE — PROGRESS NOTE ADULT - SUBJECTIVE AND OBJECTIVE BOX
DATE OF SERVICE: 07-10-24 @ 12:16    Patient is a 78y old  Male who presents with a chief complaint of hypotension (10 Jul 2024 11:40)      INTERVAL HISTORY: intubated    REVIEW OF SYSTEMS:  CONSTITUTIONAL: No weakness  EYES/ENT: No visual changes;  No throat pain   NECK: No pain or stiffness  RESPIRATORY: No cough, wheezing; No shortness of breath  CARDIOVASCULAR: No chest pain or palpitations  GASTROINTESTINAL: No abdominal  pain. No nausea, vomiting, or hematemesis  GENITOURINARY: No dysuria, frequency or hematuria  NEUROLOGICAL: No stroke like symptoms  SKIN: No rashes    TELEMETRY Personally reviewed: AFib 80s-90s  	  MEDICATIONS:  norepinephrine Infusion 0.05 MICROgram(s)/kG/Min IV Continuous <Continuous>  phenylephrine    Infusion 0.8 MICROgram(s)/kG/Min IV Continuous <Continuous>        PHYSICAL EXAM:  T(C): 36.7 (07-10-24 @ 12:00), Max: 39.2 (07-09-24 @ 23:00)  HR: 77 (07-10-24 @ 12:00) (60 - 118)  BP: 92/54 (07-09-24 @ 13:27) (92/54 - 92/54)  RR: 25 (07-10-24 @ 12:00) (16 - 34)  SpO2: 99% (07-10-24 @ 12:00) (98% - 100%)  Wt(kg): --  I&O's Summary    09 Jul 2024 07:01  -  10 Jul 2024 07:00  --------------------------------------------------------  IN: 1173.5 mL / OUT: 335 mL / NET: 838.5 mL    10 Jul 2024 07:01  -  10 Jul 2024 12:16  --------------------------------------------------------  IN: 457.5 mL / OUT: 50 mL / NET: 407.5 mL      Height (cm): 188 (07-09 @ 13:27)  Weight (kg): 98.7 (07-09 @ 13:27)  BMI (kg/m2): 27.9 (07-09 @ 13:27)  BSA (m2): 2.25 (07-09 @ 13:27)    Appearance: In no distress	  HEENT:    PERRL, EOMI	  Cardiovascular:  S1 S2, No JVD  Respiratory: Lungs clear to auscultation	  Gastrointestinal:  Soft, Non-tender, + BS	  Vascularature:  No edema of LE  Psychiatric: Appropriate affect   Neuro: no acute focal deficits                               12.0   11.38 )-----------( 179      ( 10 Jul 2024 06:51 )             35.2     07-10    143  |  109<H>  |  85<H>  ----------------------------<  138<H>  4.0   |  15<L>  |  2.75<H>    Ca    8.9      10 Jul 2024 09:50  Phos  5.9     07-10  Mg     1.6     07-10    TPro  5.3<L>  /  Alb  2.5<L>  /  TBili  0.5  /  DBili  x   /  AST  2867<H>  /  ALT  1093<H>  /  AlkPhos  260<H>  07-10        Labs personally reviewed      ASSESSMENT/PLAN: 	    78-year-old male multiple medical problems history of hepatocellular carcinoma status post radiation therapy, AF s/p DCCV on Eliquis who was found to be hypotensive following NST. Patient has reported general weakness, fatigue, lightheadedness since being discharged from hospital. Reports mild chest discomfort in midsternal region. Reports dyspnea with minimal exertion. Also reports significant lack of appetite and poor PO intake. No dark or bloody stool, nausea or vomiting.       Problem/Plan - 1:  ·  Problem: Hypotension  ·  Plan: Continue to hold home torsemide and Jardiance.   - Patient presents with hypotension and also RAZIA. Has known orthostatic hypotension.   - Patient and wife report decreased PO intake likely 2/2 malignancy.  - Appreciate PT recs  - Orthos still positive: s/p fluids with improvement in degree of orthostasis  - Increase Midodrine to 20mg PO TID   - Appreciate Endocrine recommendations to r/o adrenal insufficiency  - Plan to start Northera if insurance approves   - 7/6 pt states he walked to the bathroom with no dizziness or lightheadedness  - c/w IVF given diarrhea with minimal PO intake  - GIB 7/9, s/p 4 units prbc, IR for mesenteric embolization, now in MICU on pressors     Problem/Plan - 2:  ·  Problem: CAD.   ·  Plan: Recent PCI to pLAD in June 2023  - ECG non-ischemic  - c/w Plavix and statin  - can start cangrelor gtt in place of Plavix if needed     Problem/Plan - 3:  ·  Problem: Atrial Fibrillation  - s/p succesful DCCV 10/31  - Hold Eliquis 5mg BID given GIB  - C/w of Sotalol 40mg PO daily (qTC wnl)    Problem/Plan - 4:  ·  Problem: H/O Pericarditis.   ·  Plan: Chest pain now improved since last admission.  -  d/c colchicine 0.6mg PO daily    Problem/Plan - 4:  ·  Problem: Preop Risk Stratification.   ·  Plan: Patient is moderate risk for low risk, urgent endoscopy.  No cardiac contraindication to proceeed.  Should continue Plavix given recent Stent.          Iolani Behrbom, AG-NP   Hank Hayes DO [facility]  Cardiovascular Medicine  [address] [place] [address], [address]  Available through call or text on [employer]  Office: [phone]   DATE OF SERVICE: 07-10-24 @ 12:16    Patient is a 78y old  Male who presents with a chief complaint of hypotension (10 Jul 2024 11:40)      INTERVAL HISTORY: intubated    REVIEW OF SYSTEMS:  CONSTITUTIONAL: No weakness  EYES/ENT: No visual changes;  No throat pain   NECK: No pain or stiffness  RESPIRATORY: No cough, wheezing; No shortness of breath  CARDIOVASCULAR: No chest pain or palpitations  GASTROINTESTINAL: No abdominal  pain. No nausea, vomiting, or hematemesis  GENITOURINARY: No dysuria, frequency or hematuria  NEUROLOGICAL: No stroke like symptoms  SKIN: No rashes    TELEMETRY Personally reviewed: AFib 80s-90s  	  MEDICATIONS:  norepinephrine Infusion 0.05 MICROgram(s)/kG/Min IV Continuous <Continuous>  phenylephrine    Infusion 0.8 MICROgram(s)/kG/Min IV Continuous <Continuous>        PHYSICAL EXAM:  T(C): 36.7 (07-10-24 @ 12:00), Max: 39.2 (07-09-24 @ 23:00)  HR: 77 (07-10-24 @ 12:00) (60 - 118)  BP: 92/54 (07-09-24 @ 13:27) (92/54 - 92/54)  RR: 25 (07-10-24 @ 12:00) (16 - 34)  SpO2: 99% (07-10-24 @ 12:00) (98% - 100%)  Wt(kg): --  I&O's Summary    09 Jul 2024 07:01  -  10 Jul 2024 07:00  --------------------------------------------------------  IN: 1173.5 mL / OUT: 335 mL / NET: 838.5 mL    10 Jul 2024 07:01  -  10 Jul 2024 12:16  --------------------------------------------------------  IN: 457.5 mL / OUT: 50 mL / NET: 407.5 mL      Height (cm): 188 (07-09 @ 13:27)  Weight (kg): 98.7 (07-09 @ 13:27)  BMI (kg/m2): 27.9 (07-09 @ 13:27)  BSA (m2): 2.25 (07-09 @ 13:27)    Appearance: In no distress	  HEENT:    PERRL, EOMI	  Cardiovascular:  S1 S2, No JVD  Respiratory: Lungs clear to auscultation	  Gastrointestinal:  Soft, Non-tender, + BS	  Vascularature:  No edema of LE  Psychiatric: Appropriate affect   Neuro: no acute focal deficits                               12.0   11.38 )-----------( 179      ( 10 Jul 2024 06:51 )             35.2     07-10    143  |  109<H>  |  85<H>  ----------------------------<  138<H>  4.0   |  15<L>  |  2.75<H>    Ca    8.9      10 Jul 2024 09:50  Phos  5.9     07-10  Mg     1.6     07-10    TPro  5.3<L>  /  Alb  2.5<L>  /  TBili  0.5  /  DBili  x   /  AST  2867<H>  /  ALT  1093<H>  /  AlkPhos  260<H>  07-10        Labs personally reviewed      ASSESSMENT/PLAN: 	    78-year-old male multiple medical problems history of hepatocellular carcinoma status post radiation therapy, AF s/p DCCV on Eliquis who was found to be hypotensive following NST. Patient has reported general weakness, fatigue, lightheadedness since being discharged from hospital. Reports mild chest discomfort in midsternal region. Reports dyspnea with minimal exertion. Also reports significant lack of appetite and poor PO intake. No dark or bloody stool, nausea or vomiting.       Problem/Plan - 1:  ·  Problem: Hypotension  ·  Plan: Continue to hold home torsemide and Jardiance.   - Patient presents with hypotension and also RAZIA. Has known orthostatic hypotension.   - Patient and wife report decreased PO intake likely 2/2 malignancy.  - Appreciate PT recs  - Orthos still positive: s/p fluids with improvement in degree of orthostasis  - Increase Midodrine to 20mg PO TID   - Appreciate Endocrine recommendations to r/o adrenal insufficiency  - Plan to start Northera if insurance approves   - 7/6 pt states he walked to the bathroom with no dizziness or lightheadedness  - c/w IVF given diarrhea with minimal PO intake  - GIB 7/9, s/p 4 units prbc, IR for mesenteric embolization, now in MICU on pressors     Problem/Plan - 2:  ·  Problem: CAD.   ·  Plan: Recent PCI to pLAD in June 2023  - ECG non-ischemic  - c/w Plavix and statin  - can start cangrelor gtt in place of Plavix if needed     Problem/Plan - 3:  ·  Problem: Atrial Fibrillation  - s/p succesful DCCV 10/31  - Hold Eliquis 5mg BID given GIB  - C/w of Sotalol 40mg PO daily (qTC wnl)    Problem/Plan - 4:  ·  Problem: H/O Pericarditis.   ·  Plan: Chest pain now improved since last admission.  -  d/c colchicine 0.6mg PO daily    Problem/Plan - 4:  ·  Problem: Preop Risk Stratification.   ·  Plan: Patient is moderate risk for low risk, urgent endoscopy.  No cardiac contraindication to proceeed.  Should continue Plavix/Cangleror given recent Stent.          Iolani Behrbom, AG-NP   Hank Hayes DO [facility]  Cardiovascular Medicine  [address], [address]  Available through call or text on [employer]  Office: [phone]

## 2024-07-10 NOTE — PROGRESS NOTE ADULT - SUBJECTIVE AND OBJECTIVE BOX
Interventional Radiology Follow-Up Note    This is a 78y Male s/p Empiric GDA Embolization on 7/9 in Interventional Radiology with Dr. Zuleta.     S: Patient seen and examined @ bedside. No complaints offered.     Medication:      phenylephrine    Infusion: (07-09)    Vitals:   T(F): 98.1, Max: 102.6 (23:00)  HR: 72  BP: --  RR: 21  SpO2: 99%    Physical Exam:  General: Nontoxic, in NAD, A&O x3.  Abdomen: soft, NTND, no peritoneal signs.  Extremities:  ____ groin clean, dry and intact, soft with no evidence of hematoma, ___femoral/dp/pt pulses +___. No pedal edema or calf tenderness noted.  Drain Device: Drain intact attached to ____. Dressing clean, dry, intact.    LABS:  WBC 10.61 / Hgb 11.8 / Hct 33.8 / Plt 175  Na -- / K -- / CO2 -- / Cl -- / BUN -- / Cr -- / Glucose --  ALT -- / AST -- / Alk Phos -- / Tbili --  Ptt -- / Pt -- / INR --    Assessment/Plan:      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, PAD, and orthostatic hypotension on midodrine presenting for lightheadedness and hypotension. He was sent in from outpatient office visit for complaint of lightheadedness and low bp to systolics in 80s despite midodrine. Patient with melena and abdominal pain. 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 RRT today. Patient taken to endoscopy with inability to control bleeding. No clip placed. Of note, patient has history of recent stent 6/12/24 requiring plavix and eliquis with last eliquis dose at 6 pm on 7/8 and plavix dose this morning. Patient now s/p empiric GDA embolization on 7/9 in IR with Dr. Zuleta.     - continue global management per primary team  - monitor h/h; transfuse as needed; currently stable 11.8/33  - trend vs/labs  - wean pressors as tolerated  - has not required additional blood products post embolization  - Right groin site   -       Please call IR at extension [phone] with any questions, concerns, or issues regarding above.       Interventional Radiology Follow-Up Note    This is a 78y Male s/p Empiric GDA Embolization on 7/9 in Interventional Radiology with Dr. Zuleta.     S: Patient seen and examined @ bedside. Unable to obtain appropriate HPI as patient is sedated and intubated.     Medication:      phenylephrine    Infusion: (07-09)    Vitals:   T(F): 98.1, Max: 102.6 (23:00)  HR: 72  BP: --  RR: 21  SpO2: 99%    Physical Exam:  General: sedated   Respiratory: intubated  Abdomen: soft, nontender, nondistended   Extremities:  B/L cool to touch, Right groin clean, dry and intact, soft with no evidence of hematoma, doppler pedal and PT pulses, No pedal edema.     LABS:  WBC 10.61 / Hgb 11.8 / Hct 33.8 / Plt 175  Na -- / K -- / CO2 -- / Cl -- / BUN -- / Cr -- / Glucose --  ALT -- / AST -- / Alk Phos -- / Tbili --  Ptt -- / Pt -- / INR --    Assessment/Plan:      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, PAD, and orthostatic hypotension on midodrine presenting for lightheadedness and hypotension. He was sent in from outpatient office visit for complaint of lightheadedness and low bp to systolics in 80s despite midodrine. Patient with melena and abdominal pain. 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 RRT today. Patient taken to endoscopy with inability to control bleeding. No clip placed. Of note, patient has history of recent stent 6/12/24 requiring plavix and eliquis with last eliquis dose at 6 pm on 7/8 and plavix dose this morning. Patient now s/p empiric GDA embolization on 7/9 in IR with Dr. Zuleta.     - continue global management per primary team  - monitor h/h; transfuse as needed; currently stable 11.8/33, continue to trend H/H  - trend vs/labs  - wean pressors as tolerated  - has not required additional blood products post embolization  - Right groin site c/d/i, soft nontender, no hematoma   - IR to continue to follow  - Rest of care per primary team       Please call IR at extension [phone] with any questions, concerns, or issues regarding above.

## 2024-07-10 NOTE — CHART NOTE - NSCHARTNOTEFT_GEN_A_CORE
: Eron GLYNN    INDICATION: Hypotension    PROCEDURE:  [X] LIMITED ECHO  [X] LIMITED CHEST  [ ] LIMITED RETROPERITONEAL  [ ] LIMITED ABDOMINAL  [ ] LIMITED DVT  [ ] NEEDLE GUIDANCE VASCULAR  [ ] NEEDLE GUIDANCE THORACENTESIS  [ ] NEEDLE GUIDANCE PARACENTESIS  [ ] NEEDLE GUIDANCE PERICARDIOCENTESIS  [ ] OTHER    FINDINGS:  Lungs: A-line predominant pattern in anterior lung fields bilaterally. Small left sided pleural effusion. Lung sliding present. No noted consolidations.  Heart: Limited views. RV LV grossly normal function. LV>RV. Hypertrophic LV. IVC .8cm w/ >50% collapsibility w/ respirations.    INTERPRETATION:  No signs of pulmonary edema with small IVC. Plan to order 500cc albumin 5%.       Images uploaded on [employer] : Eron GLYNN    INDICATION: Hypotension    PROCEDURE:  [X] LIMITED ECHO  [X] LIMITED CHEST  [ ] LIMITED RETROPERITONEAL  [ ] LIMITED ABDOMINAL  [ ] LIMITED DVT  [ ] NEEDLE GUIDANCE VASCULAR  [ ] NEEDLE GUIDANCE THORACENTESIS  [ ] NEEDLE GUIDANCE PARACENTESIS  [ ] NEEDLE GUIDANCE PERICARDIOCENTESIS  [ ] OTHER    FINDINGS:  Lungs: A-line predominant pattern in anterior lung fields bilaterally. Small left lower pleural effusion. Lung sliding present. No noted consolidations.  Heart: Limited views. RV LV grossly normal function. LV>RV. Hypertrophic LV. IVC .8cm w/ >50% collapsibility w/ respirations.    INTERPRETATION:  No signs of pulmonary edema with small IVC. Plan to order 500cc albumin 5%.       Images uploaded on [employer]

## 2024-07-10 NOTE — PROGRESS NOTE ADULT - SUBJECTIVE AND OBJECTIVE BOX
INTERVAL HPI/OVERNIGHT EVENTS:    SUBJECTIVE: Patient seen and examined at bedside.     ROS: All negative except as listed above.    VITAL SIGNS:  ICU Vital Signs Last 24 Hrs  T(C): 37.2 (10 Jul 2024 04:00), Max: 39.2 (09 Jul 2024 23:00)  T(F): 99 (10 Jul 2024 04:00), Max: 102.6 (09 Jul 2024 23:00)  HR: 64 (10 Jul 2024 06:45) (62 - 118)  BP: 92/54 (09 Jul 2024 13:27) (92/54 - 92/54)  BP(mean): 71 (09 Jul 2024 13:27) (71 - 71)  ABP: 94/49 (10 Jul 2024 06:45) (94/49 - 196/93)  ABP(mean): 65 (10 Jul 2024 06:45) (59 - 207)  RR: 20 (10 Jul 2024 06:45) (16 - 34)  SpO2: 100% (10 Jul 2024 06:45) (98% - 100%)    O2 Parameters below as of 09 Jul 2024 20:00  Patient On (Oxygen Delivery Method): ventilator    O2 Concentration (%): 40      Mode: AC/ CMV (Assist Control/ Continuous Mandatory Ventilation), RR (machine): 16, TV (machine): 450, FiO2: 40, PEEP: 5, ITime: 1, MAP: 9, PIP: 20  Plateau pressure:   P/F ratio:     07-08 @ 07:01  -  07-09 @ 07:00  --------------------------------------------------------  IN: 1159.6 mL / OUT: 550 mL / NET: 609.6 mL    07-09 @ 07:01  -  07-10 @ 06:48  --------------------------------------------------------  IN: 1173.5 mL / OUT: 325 mL / NET: 848.5 mL      CAPILLARY BLOOD GLUCOSE      POCT Blood Glucose.: 157 mg/dL (09 Jul 2024 08:39)      ECG: reviewed.    PHYSICAL EXAM:    GENERAL: NAD, lying in bed comfortably  HEAD:  Atraumatic, normocephalic  EYES: EOMI, PERRLA, conjunctiva and sclera clear  NECK: Supple, trachea midline, no JVD  HEART: Regular rate and rhythm, no murmurs, rubs, or gallops  LUNGS: Unlabored respirations.  Clear to auscultation bilaterally, no crackles, wheezing, or rhonchi  ABDOMEN: Soft, nontender, nondistended, +BS  EXTREMITIES: 2+ peripheral pulses bilaterally, cap refill<2 secs. No clubbing, cyanosis, or edema  NERVOUS SYSTEM:  A&Ox3, following commands, moving all extremities, no focal deficits   SKIN: No rashes or lesions    MEDICATIONS:  MEDICATIONS  (STANDING):  allopurinol 100 milliGRAM(s) Oral two times a day  ascorbic acid 500 milliGRAM(s) Oral daily  atorvastatin 80 milliGRAM(s) Oral at bedtime  chlorhexidine 0.12% Liquid 15 milliLiter(s) Oral Mucosa every 12 hours  dexMEDEtomidine Infusion 0.5 MICROgram(s)/kG/Hr (12.3 mL/Hr) IV Continuous <Continuous>  folic acid 1 milliGRAM(s) Oral daily  lidocaine   4% Patch 1 Patch Transdermal daily  multivitamin 1 Tablet(s) Oral daily  pantoprazole Infusion 8 mG/Hr (10 mL/Hr) IV Continuous <Continuous>  phenylephrine    Infusion 0.8 MICROgram(s)/kG/Min (29.6 mL/Hr) IV Continuous <Continuous>  sodium bicarbonate 1300 milliGRAM(s) Oral three times a day  thiamine 100 milliGRAM(s) Oral daily    MEDICATIONS  (PRN):  acetaminophen     Tablet .. 650 milliGRAM(s) Oral every 6 hours PRN Temp greater or equal to 38C (100.4F), Mild Pain (1 - 3)  fentaNYL    Injectable 50 MICROGram(s) IV Push every 4 hours PRN Severe Pain (7 - 10)  LORazepam   Injectable 2 milliGRAM(s) IV Push every 4 hours PRN Agitation  ondansetron Injectable 4 milliGRAM(s) IV Push every 8 hours PRN Nausea and/or Vomiting      ALLERGIES:  Allergies    No Known Allergies    Intolerances        LABS:                        12.5   12.56 )-----------( 193      ( 10 Jul 2024 00:16 )             35.6     07-10    142  |  108  |  84<H>  ----------------------------<  146<H>  4.1   |  16<L>  |  2.50<H>    Ca    9.5      10 Jul 2024 00:16  Phos  5.3     07-10  Mg     1.6     07-10    TPro  5.5<L>  /  Alb  2.8<L>  /  TBili  0.5  /  DBili  x   /  AST  1936<H>  /  ALT  622<H>  /  AlkPhos  259<H>  07-10    PT/INR - ( 10 Jul 2024 00:16 )   PT: 14.5 sec;   INR: 1.33 ratio         PTT - ( 10 Jul 2024 00:16 )  PTT:30.1 sec  Urinalysis Basic - ( 10 Jul 2024 00:16 )    Color: x / Appearance: x / SG: x / pH: x  Gluc: 146 mg/dL / Ketone: x  / Bili: x / Urobili: x   Blood: x / Protein: x / Nitrite: x   Leuk Esterase: x / RBC: x / WBC x   Sq Epi: x / Non Sq Epi: x / Bacteria: x      ABG:  pH, Arterial: 7.38 (07-10-24 @ 00:13)  pCO2, Arterial: 27 mmHg (07-10-24 @ 00:13)  pO2, Arterial: 170 mmHg (07-10-24 @ 00:13)  pH, Arterial: 7.32 (07-09-24 @ 18:26)  pCO2, Arterial: 36 mmHg (07-09-24 @ 18:26)  pO2, Arterial: 220 mmHg (07-09-24 @ 18:26)  pH, Arterial: 7.31 (07-09-24 @ 17:04)  pCO2, Arterial: 32 mmHg (07-09-24 @ 17:04)  pO2, Arterial: 169 mmHg (07-09-24 @ 17:04)  pH, Arterial: 7.29 (07-09-24 @ 16:19)  pCO2, Arterial: 38 mmHg (07-09-24 @ 16:19)  pO2, Arterial: 343 mmHg (07-09-24 @ 16:19)  pH, Arterial: 7.21 (07-09-24 @ 14:54)  pCO2, Arterial: 41 mmHg (07-09-24 @ 14:54)  pO2, Arterial: 238 mmHg (07-09-24 @ 14:54)      vBG:  pH, Venous: 7.31 (07-09-24 @ 10:20)  pCO2, Venous: 32 mmHg (07-09-24 @ 10:20)  pO2, Venous: 44 mmHg (07-09-24 @ 10:20)  HCO3, Venous: 16 mmol/L (07-09-24 @ 10:20)  pH, Venous: 7.30 (07-09-24 @ 08:55)  pCO2, Venous: 35 mmHg (07-09-24 @ 08:55)  pO2, Venous: 25 mmHg (07-09-24 @ 08:55)  HCO3, Venous: 17 mmol/L (07-09-24 @ 08:55)    Micro:        RADIOLOGY & ADDITIONAL TESTS: Reviewed.   INTERVAL HPI/OVERNIGHT EVENTS: No Acute Overnight Event.     SUBJECTIVE: Patient seen and examined at bedside.     ROS: Unable to assess given pt's clinical status     VITAL SIGNS:  ICU Vital Signs Last 24 Hrs  T(C): 37.2 (10 Jul 2024 04:00), Max: 39.2 (09 Jul 2024 23:00)  T(F): 99 (10 Jul 2024 04:00), Max: 102.6 (09 Jul 2024 23:00)  HR: 64 (10 Jul 2024 06:45) (62 - 118)  BP: 92/54 (09 Jul 2024 13:27) (92/54 - 92/54)  BP(mean): 71 (09 Jul 2024 13:27) (71 - 71)  ABP: 94/49 (10 Jul 2024 06:45) (94/49 - 196/93)  ABP(mean): 65 (10 Jul 2024 06:45) (59 - 207)  RR: 20 (10 Jul 2024 06:45) (16 - 34)  SpO2: 100% (10 Jul 2024 06:45) (98% - 100%)    O2 Parameters below as of 09 Jul 2024 20:00  Patient On (Oxygen Delivery Method): ventilator    O2 Concentration (%): 40      Mode: AC/ CMV (Assist Control/ Continuous Mandatory Ventilation), RR (machine): 16, TV (machine): 450, FiO2: 40, PEEP: 5, ITime: 1, MAP: 9, PIP: 20  Plateau pressure:   P/F ratio:     07-08 @ 07:01 - 07-09 @ 07:00  --------------------------------------------------------  IN: 1159.6 mL / OUT: 550 mL / NET: 609.6 mL    07-09 @ 07:01  -  07-10 @ 06:48  --------------------------------------------------------  IN: 1173.5 mL / OUT: 325 mL / NET: 848.5 mL      CAPILLARY BLOOD GLUCOSE      POCT Blood Glucose.: 157 mg/dL (09 Jul 2024 08:39)      ECG: reviewed.    PHYSICAL EXAM:    GENERAL: NAD, lying in bed comfortably  HEAD:  Atraumatic, normocephalic  EYES: Eyes closed  NECK: Supple, trachea midline, no JVD  HEART: Regular rate and rhythm, no murmurs, rubs, or gallops  LUNGS: Unlabored respirations.   ABDOMEN: Soft, nontender, nondistended, +BS  EXTREMITIES:  No clubbing, cyanosis, or edema  NERVOUS SYSTEM:  Sedated  SKIN: No rashes or lesions    MEDICATIONS:  MEDICATIONS  (STANDING):  allopurinol 100 milliGRAM(s) Oral two times a day  ascorbic acid 500 milliGRAM(s) Oral daily  atorvastatin 80 milliGRAM(s) Oral at bedtime  chlorhexidine 0.12% Liquid 15 milliLiter(s) Oral Mucosa every 12 hours  dexMEDEtomidine Infusion 0.5 MICROgram(s)/kG/Hr (12.3 mL/Hr) IV Continuous <Continuous>  folic acid 1 milliGRAM(s) Oral daily  lidocaine   4% Patch 1 Patch Transdermal daily  multivitamin 1 Tablet(s) Oral daily  pantoprazole Infusion 8 mG/Hr (10 mL/Hr) IV Continuous <Continuous>  phenylephrine    Infusion 0.8 MICROgram(s)/kG/Min (29.6 mL/Hr) IV Continuous <Continuous>  sodium bicarbonate 1300 milliGRAM(s) Oral three times a day  thiamine 100 milliGRAM(s) Oral daily    MEDICATIONS  (PRN):  acetaminophen     Tablet .. 650 milliGRAM(s) Oral every 6 hours PRN Temp greater or equal to 38C (100.4F), Mild Pain (1 - 3)  fentaNYL    Injectable 50 MICROGram(s) IV Push every 4 hours PRN Severe Pain (7 - 10)  LORazepam   Injectable 2 milliGRAM(s) IV Push every 4 hours PRN Agitation  ondansetron Injectable 4 milliGRAM(s) IV Push every 8 hours PRN Nausea and/or Vomiting      ALLERGIES:  Allergies    No Known Allergies    Intolerances        LABS:                        12.5   12.56 )-----------( 193      ( 10 Jul 2024 00:16 )             35.6     07-10    142  |  108  |  84<H>  ----------------------------<  146<H>  4.1   |  16<L>  |  2.50<H>    Ca    9.5      10 Jul 2024 00:16  Phos  5.3     07-10  Mg     1.6     07-10    TPro  5.5<L>  /  Alb  2.8<L>  /  TBili  0.5  /  DBili  x   /  AST  1936<H>  /  ALT  622<H>  /  AlkPhos  259<H>  07-10    PT/INR - ( 10 Jul 2024 00:16 )   PT: 14.5 sec;   INR: 1.33 ratio         PTT - ( 10 Jul 2024 00:16 )  PTT:30.1 sec  Urinalysis Basic - ( 10 Jul 2024 00:16 )    Color: x / Appearance: x / SG: x / pH: x  Gluc: 146 mg/dL / Ketone: x  / Bili: x / Urobili: x   Blood: x / Protein: x / Nitrite: x   Leuk Esterase: x / RBC: x / WBC x   Sq Epi: x / Non Sq Epi: x / Bacteria: x      ABG:  pH, Arterial: 7.38 (07-10-24 @ 00:13)  pCO2, Arterial: 27 mmHg (07-10-24 @ 00:13)  pO2, Arterial: 170 mmHg (07-10-24 @ 00:13)  pH, Arterial: 7.32 (07-09-24 @ 18:26)  pCO2, Arterial: 36 mmHg (07-09-24 @ 18:26)  pO2, Arterial: 220 mmHg (07-09-24 @ 18:26)  pH, Arterial: 7.31 (07-09-24 @ 17:04)  pCO2, Arterial: 32 mmHg (07-09-24 @ 17:04)  pO2, Arterial: 169 mmHg (07-09-24 @ 17:04)  pH, Arterial: 7.29 (07-09-24 @ 16:19)  pCO2, Arterial: 38 mmHg (07-09-24 @ 16:19)  pO2, Arterial: 343 mmHg (07-09-24 @ 16:19)  pH, Arterial: 7.21 (07-09-24 @ 14:54)  pCO2, Arterial: 41 mmHg (07-09-24 @ 14:54)  pO2, Arterial: 238 mmHg (07-09-24 @ 14:54)      vBG:  pH, Venous: 7.31 (07-09-24 @ 10:20)  pCO2, Venous: 32 mmHg (07-09-24 @ 10:20)  pO2, Venous: 44 mmHg (07-09-24 @ 10:20)  HCO3, Venous: 16 mmol/L (07-09-24 @ 10:20)  pH, Venous: 7.30 (07-09-24 @ 08:55)  pCO2, Venous: 35 mmHg (07-09-24 @ 08:55)  pO2, Venous: 25 mmHg (07-09-24 @ 08:55)  HCO3, Venous: 17 mmol/L (07-09-24 @ 08:55)    Micro:        RADIOLOGY & ADDITIONAL TESTS: Reviewed.

## 2024-07-10 NOTE — CHART NOTE - NSCHARTNOTEFT_GEN_A_CORE
NUTRITION FOLLOW UP NOTE    PATIENT SEEN FOR: malnutrition follow up/transfer to MICU.     SOURCE: [] Patient  [x] Current Medical Record  [x] RN  [] Family/support person at bedside  [x] Patient unavailable/inappropriate  [x] Other: interdisciplinary team     CHART REVIEWED/EVENTS NOTED.  [] No changes to nutrition care plan to note  [x] Nutrition Status:   - Per notes: Pt s/p rapid response on floors 7/9, went to EGD. During EGD, pt became hypotensive and was given Phenyephrine, had A-fib with RVR s/p amiodarone. Uncontrollable bleeding per chart. Now s/p IR embolization, admitted to MICU hemorrhagic shock secondary to GI bleed.   - Pt now intubated/sedated.   - Per GI, no OGT or NGT placement at this time     DIET ORDER:   Diet, NPO (07-09-24)    CURRENT DIET ORDER IS:  [x] Appropriate:  [] Inadequate:  [] Other:    NUTRITION INTAKE/PROVISION:  [x] PO: NPO  [] Enteral Nutrition:  [] Parenteral Nutrition:    ANTHROPOMETRICS:  Drug Dosing Weight  Height (cm): 188 (09 Jul 2024 13:27)  Weight (kg): 98.7 (09 Jul 2024 13:27)  BMI (kg/m2): 27.9 (09 Jul 2024 13:27)  BSA (m2): 2.25 (09 Jul 2024 13:27)    Weights:   no updated weights available at this time, will continue to monitor weights for changes if any     MEDICATIONS:  MEDICATIONS  (STANDING):  allopurinol 100 milliGRAM(s) Oral two times a day  ascorbic acid 500 milliGRAM(s) Oral daily  atorvastatin 80 milliGRAM(s) Oral at bedtime  cangrelor Infusion 4 MICROgram(s)/kG/Min (118 mL/Hr) IV Continuous <Continuous>  chlorhexidine 0.12% Liquid 15 milliLiter(s) Oral Mucosa every 12 hours  dexMEDEtomidine Infusion 0.5 MICROgram(s)/kG/Hr (12.3 mL/Hr) IV Continuous <Continuous>  folic acid 1 milliGRAM(s) Oral daily  lidocaine   4% Patch 1 Patch Transdermal daily  multivitamin 1 Tablet(s) Oral daily  pantoprazole Infusion 8 mG/Hr (10 mL/Hr) IV Continuous <Continuous>  phenylephrine    Infusion 0.8 MICROgram(s)/kG/Min (29.6 mL/Hr) IV Continuous <Continuous>  sodium bicarbonate 1300 milliGRAM(s) Oral three times a day  thiamine 100 milliGRAM(s) Oral daily    MEDICATIONS  (PRN):  acetaminophen     Tablet .. 650 milliGRAM(s) Oral every 6 hours PRN Temp greater or equal to 38C (100.4F), Mild Pain (1 - 3)  fentaNYL    Injectable 50 MICROGram(s) IV Push every 4 hours PRN Severe Pain (7 - 10)  LORazepam   Injectable 2 milliGRAM(s) IV Push every 4 hours PRN Agitation  ondansetron Injectable 4 milliGRAM(s) IV Push every 8 hours PRN Nausea and/or Vomiting    NUTRITIONALLY PERTINENT LABS:  07-10 Na143 mmol/L Glu 138 mg/dL<H> K+ 4.0 mmol/L Cr  2.75 mg/dL<H> BUN 85 mg/dL<H> 07-10 Phos 5.9 mg/dL<H> 07-10 Alb 2.5 g/dL<L>07-10 ALT 1093 U/L<H> AST 2867 U/L<H> Alkaline Phosphatase 260 U/L<H>    Finger Sticks:    NUTRITIONALLY PERTINENT MEDICATIONS/LABS:  [x] Reviewed  [x] Relevant notes on medications/labs:   - Phenylephrine gtt     EDEMA:  [x] Reviewed: +1 generalized  [] Relevant notes:    GI/ I&O:  [x] Reviewed  [x] Relevant notes: Last BM 7/9.   [x] Other:   - " CT A/P 7/9 - Active bleeding in the third portion of the duodenum. Progression of liver metastases.  - EGD 7/9 showed esophagitis, One non-bleeding duodenal ulcer with a clean ulcer base (Arjun Class III). One oozing duodenal ulcer with spurting hemorrhage (Arjun Class Ia). Treatment not successful. Treated with bipolar cautery."     SKIN:   [x] No pressure injuries documented, per nursing flowsheet  [] Pressure injury previously noted  [] Change in pressure injury documentation:  [] Other:    ESTIMATED NEEDS:  [] No change:  [x] Updated:  Energy: 1972 - 2468 kcal/day (20-25 kcal/kg)  Protein:  118 - 138 g/day (1.2-1.4 g/kg)  Fluid:   ml/day or [x] defer to team  Timothy State Equation: 2316kcal (pt febrile in past 24hr) or 23kcal/kg  Based on: dosing wt 98.7kg     NUTRITION DIAGNOSIS:  [x] Prior Dx: Severe chronic malnutrition, Increased Nutrient Needs  [] New Dx:    EDUCATION:  [] Yes:  [x] Not appropriate/warranted    NUTRITION CARE PLAN:  1. Current medical condition precludes nutrition intervention at this time. When/if able to have enteral access please consult RD for tube feeding recommendations. If medically feasible and within GOC can consider parenteral nutrition. Consider nutrition support team consult if within GOC.     [] Achieved - Continue current nutrition intervention(s)  [x] Current medical condition precludes nutrition intervention at this time.    MONITORING AND EVALUATION:   RD remains available upon request and will follow up per protocol.    Corrie Veliz MS, RD, CDN, CNSC  Available on [facility] TEAMS

## 2024-07-10 NOTE — PROGRESS NOTE ADULT - NS ATTEND AMEND GEN_ALL_CORE FT
11.2   9.73  )-----------( 160      ( 10 Jul 2024 17:59 )             33.0   SD    CBC Full  -  ( 10 Jul 2024 17:59 )  WBC Count : 9.73 K/uL  RBC Count : 3.79 M/uL  Hemoglobin : 11.2 g/dL  Hematocrit : 33.0 %  Platelet Count - Automated : 160 K/uL  Mean Cell Volume : 87.1 fl  Mean Cell Hemoglobin : 29.6 pg  Mean Cell Hemoglobin Concentration : 33.9 gm/dL  Auto Neutrophil # : 8.29 K/uL  Auto Lymphocyte # : 0.65 K/uL  Auto Monocyte # : 0.73 K/uL  Auto Eosinophil # : 0.00 K/uL  Auto Basophil # : 0.01 K/uL  Auto Neutrophil % : 85.2 %  Auto Lymphocyte % : 6.7 %  Auto Monocyte % : 7.5 %  Auto Eosinophil % : 0.0 %  Auto Basophil % : 0.1 %      07-10    141  |  110<H>  |  90<H>  ----------------------------<  136<H>  3.8   |  13<L>  |  2.89<H>    Ca    8.7      10 Jul 2024 17:59  Phos  6.4     07-10  Mg     1.8     07-10    TPro  5.1<L>  /  Alb  2.2<L>  /  TBili  0.6  /  DBili  x   /  AST  3794<H>  /  ALT  1405<H>  /  AlkPhos  284<H>  07-10      CAPILLARY BLOOD GLUCOSE          Vital Signs Last 24 Hrs  T(C): 10 (10 Jul 2024 20:00), Max: 39.2 (09 Jul 2024 23:00)  T(F): 101.1 (10 Jul 2024 20:00), Max: 102.6 (09 Jul 2024 23:00)  HR: 106 (10 Jul 2024 20:54) (60 - 111)  BP: --  BP(mean): --  RR: 29 (10 Jul 2024 20:45) (17 - 29)  SpO2: 98% (10 Jul 2024 20:54) (78% - 100%)    Parameters below as of 10 Jul 2024 20:00  Patient On (Oxygen Delivery Method): ventilator    O2 Concentration (%): 40    Urinalysis Basic - ( 10 Jul 2024 20:46 )    Color: Dark Yellow / Appearance: Turbid / SG: >1.030 / pH: x  Gluc: x / Ketone: Trace mg/dL  / Bili: Negative / Urobili: 0.2 mg/dL   Blood: x / Protein: 100 mg/dL / Nitrite: Negative   Leuk Esterase: Negative / RBC: 31 /HPF / WBC 4 /HPF   Sq Epi: x / Non Sq Epi: 4 /HPF / Bacteria: Negative /HPF        PT/INR - ( 10 Jul 2024 00:16 )   PT: 14.5 sec;   INR: 1.33 ratio         PTT - ( 10 Jul 2024 00:16 )  PTT:30.1 sec        MEDICATIONS  (STANDING):  allopurinol 100 milliGRAM(s) Oral two times a day  ascorbic acid 500 milliGRAM(s) Oral daily  atorvastatin 80 milliGRAM(s) Oral at bedtime  cangrelor Infusion 0.75 MICROgram(s)/kG/Min (22.2 mL/Hr) IV Continuous <Continuous>  chlorhexidine 0.12% Liquid 15 milliLiter(s) Oral Mucosa every 12 hours  chlorhexidine 2% Cloths 1 Application(s) Topical <User Schedule>  dexMEDEtomidine Infusion 0.5 MICROgram(s)/kG/Hr (12.3 mL/Hr) IV Continuous <Continuous>  folic acid 1 milliGRAM(s) Oral daily  lidocaine   4% Patch 1 Patch Transdermal daily  multivitamin 1 Tablet(s) Oral daily  norepinephrine Infusion 0.05 MICROgram(s)/kG/Min (9.25 mL/Hr) IV Continuous <Continuous>  pantoprazole Infusion 8 mG/Hr (10 mL/Hr) IV Continuous <Continuous>  propofol Infusion 10 MICROgram(s)/kG/Min (5.92 mL/Hr) IV Continuous <Continuous>  sodium bicarbonate 1300 milliGRAM(s) Oral three times a day  thiamine 100 milliGRAM(s) Oral daily

## 2024-07-10 NOTE — PROGRESS NOTE ADULT - ATTENDING COMMENTS
Agree with above. Bleeding appears to have stopped, H/H stable this morning after IR embolization. Continue IV PPI, trend H/H. If antiplatelet is needed from cardiac perspective, would weigh risks and benefits, risks of stent thrombosis is likely greater than risk of bleeding which has for now stopped.

## 2024-07-10 NOTE — PROGRESS NOTE ADULT - ASSESSMENT
79 yo male with PMH of CAD s/p PCI x3 with most recent stent 6/12/24 on Plavix, chronic Afib on eliquis, orthostatic hypotension on midodrine, PAD, 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    1. Pancreatic NET- metastatic   -- was on lanreotide then had POD in liver and started on peptide receptor radiotherapy (PRRT)- typically given every 8 weeks for 4 doses. He received one dose on 10/20/2023 and planned to get his 2nd dose at the end of December however his course was complicated by multiple hospitalizations that were noncancer related (decompensated heart failure).   -- 5/28/24 PET Dotatate (compared to 9/2023): several new somatostatin avid osseous lesions, some faintly sclerotic, suspicious for mets. Increased upper RP nodes, probably metastatic. Decreased intensely tracer avid right supradiaphragmatic and upper abdominal nodes, suspicious for metastasis. Redemonstrated intensely somatostatin avid bilobar hepatic lesions, consistent with metastases again morphologically difficult to delineate.   -- f/u with Dr. Sophia Prasad at [facility] after discharge, no plan for treatment inpatient     2. Hemorrhagic shock, diarrhea  - Monitoring orthostatics  - Follow up cardiology recommendations  - Per endocrine, adrenal insufficiency ruled out given AM cortisol of 17, continues midodrine  - GI PCR indeterminate for norovirus on repeat test  - Chromogranin level pending  - RRT 7/9 for Hypotension, shortness of breath, drop in hemoglobin.   - Found to have + FOBT, CT w/ bleed in duodenum. GI called, EGD 7/9 -> MICU consulted for uncontrolled bleeding followed by A-fib. S/p  IR embolization 7/9, intubated in MICU  - S/p multiple pRBC transfusions  - Once infection ruled out, may start octreotide 150 mcg BID    Hugo Topete PA-C  Hematology/Oncology  [facility]  [phone] (office)

## 2024-07-10 NOTE — PROGRESS NOTE ADULT - ASSESSMENT
79 yo male with PMH of CAD s/p PCI x3 with most recent stent 6/12/24 on Plavix, chronic Afib on eliquis, orthostatic hypotension on midodrine, PAD, 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 Patient states since discharge on this past Saturday, he continued to feel ongoing generalized weakness and dizziness with positional changes despite compliance with home midodrine 15mg TID and holding torsemide as instructed as of day PTA . Admits to poor PO intake of fluids/solute due to ongoing issues with poor appetite and nausea with meals which is not new. In the ED, vitals notable for SBP 92-11s. Labs notable for elevated BUN/Cr 31/1.59 (from 30/1.18 on day of discharge 6/29/24). CXR with clear lungs. Abd US with innumerable intrahepatic echogenic masses consistent with known metastatic neuroendocrine tumor. Admitted to medicine for symptomatic hypotension and RAZIA.      1- RAZIA   2- orthostatic hypotension   3- hx chf   4- pericarditis   5- diarrhea       RAZIA in setting of hypotension, anemia -> GIB, hemorrhagic shock  received CT IV contrast, and emergently went to IR for mesenteric embolization 7/9  now in [facility]  creatinine is slowly worsening.   oliguric  hypotension, on rising doses of pressors  acidosis, lactate in range  no po access, d/c sodium bicarb tabs  worsening transaminitis, and elevated cpk  recommend maintenance ivf, and consider adding sodium bicarb  strict I/O  monitor creatinine closely

## 2024-07-10 NOTE — PROGRESS NOTE ADULT - ASSESSMENT
Assessment: 79 yo male with PMH of CAD s/p PCI x3 with most recent stent 6/12/24 on Plavix and eliquis , chronic Afib on eliquis, orthostatic hypotension on midodrine, PAD, neuroendocrine tumor with RT currently on hold, recent admission for dx cath on 6/24/24 who presents from PCP's office for hypotension despite taking AM midodrine dose on 7/2, admitted to medicine for symptomatic hypotension and RAZIA. Per chart, on 7/8 PM, pt was noted to have acute onset of melena x5 and coffee ground emesis x2-3. RRT was called on 7/9 AM for hypotension, hgb dropped from 9.2 to 7.4 (admission baseline 10-11), FOBT +, pt was started on PPI IV and transfused 1 unit of pRBC. His last eliquis was 6 pm 7/8 and plavix this AM. GI was consulted and pt is scheduled for EGD on 7/9 afternoon. MICU was consulted for uncontrolled bleeding during EGD. During EGD, pt became hypotensive and was given phenyephrine, had A-fib with RVR s/p amiodarone. Now s/p IR embolization, admitted to MICU for further moniring i.s.o hemorrhagic shock 2/2 GI bleed     Plan:     Neuro   # Baseline AOx3  - intubated   - wean off sedatives as tolerates   - ativan prn for agitation   - fentanyl prn for pain control     CVS  # hemorrhagic shock   - RRT called 7/9 for hypotension   -  2/2 to Upper GI bleeds, urgently took to EGD, found bleeding ulcer that was not well controlled, underwent IR embolization   - d/c midodrine   - start levophed gtt, maintain MAP > 65  - AM cortisol 17, as per endocrine adrenal insufficiceny initially ruled out      #CAD s/p PCI x3, most recent stent 6/12/24, NURIA to pLAD   - hold eliquis and plavix, will touch base w/ GI and cardio on when to resume   - c/w statin   - will obtain vBG with lactate   - EKG   - obtain cardiac enzymes     #PAD  # A-fib on eliquis    - s/p successful DCCV 10/31   - hold sotalol given hypotension 2/2 GI bleeds   - hold eliquis     PULM   #Intubated for airway protection prior to EGD/IR embolization  - will hold off on SBT if any further procedures are planned   - will reassess upon arrival to MICU   - CXR to confirm ETT placement     Renal/  #RAZIA   #ATN i.s.o hypotension  #metabolic acidosis   - f/u nephro   - trend BUN/Cr   - monitor Is and Os   - sodium bicarbonate 1300 BID     GI   #Upper GI Bleed   - s/p 5 units of RBC   - CT A/P 7/9 - Active bleeding in the third portion of the duodenum. Progression of liver metastases.  - EGD 7/9 showed esophagitis, One non-bleeding duodenal ulcer with a clean ulcer base (Arjun Class III). One oozing duodenal ulcer with spurting hemorrhage (Arjun Class Ia). Treatment not successful. Treated with bipolar cautery.  - currently undergoing IR embolization   - PPI gtt   - Hgb goal >7, transfuse if below   - hold plavix   - hold eliquis   - per GI - no plan for another scope unless rebleeds, keep intubated ON as of now and DO NOT put in NGT/OGT.   - per IR - no plan for procedure as of now   - continue to f/u with GI and IR     #Diarrhea   - c/w maintenaince fluids     ID  #leukocytosis   - will obtain BCx, UCx, Sputum Cx   - monitor off Abx for now   - GI PCR indeterminated for norovirus     ENDOCRINE  #adrenal insufficiency   - r/o given cortisol 17     HEMATOLOGY/ONCOLOGY   - neuroendocrine tumor   - was on lanreotide then had POD in liver and started on peptide receptor radiotherapy (PRRT)- typically given every 8 weeks for 4 doses. He last received it 10/20/2023   - PET 5/28/24 shows new somatostatin avid osseous lesions; decreased intensely avid right supradiaphragmatic and upper abdominal nodes, suspicious for mets    SKINS:   - Lines/Tubes - PIV, ETT    Assessment: 79 yo male with PMH of CAD s/p PCI x3 with most recent stent 6/12/24 on Plavix and eliquis , chronic Afib on eliquis, orthostatic hypotension on midodrine, PAD, neuroendocrine tumor with RT currently on hold, recent admission for dx cath on 6/24/24 who presents from PCP's office for hypotension despite taking AM midodrine dose on 7/2, admitted to medicine for symptomatic hypotension and RAZIA. Per chart, on 7/8 PM, pt was noted to have acute onset of melena x5 and coffee ground emesis x2-3. RRT was called on 7/9 AM for hypotension, hgb dropped from 9.2 to 7.4 (admission baseline 10-11), FOBT +, pt was started on PPI IV and transfused 1 unit of pRBC. His last eliquis was 6 pm 7/8 and plavix this AM. GI was consulted and pt is scheduled for EGD on 7/9 afternoon. MICU was consulted for uncontrolled bleeding during EGD. During EGD, pt became hypotensive and was given phenyephrine, had A-fib with RVR s/p amiodarone. Now s/p IR embolization, admitted to MICU for further moniring i.s.o hemorrhagic shock 2/2 GI bleed     Plan:     Neuro   # Baseline AOx3  - intubated   - on precedex   - ativan prn for agitation   - fentanyl prn for pain control     CVS  # hemorrhagic shock   - RRT called 7/9 for hypotension   -  2/2 to Upper GI bleeds, urgently took to EGD, found bleeding ulcer that was not well controlled, underwent IR empiric embolization   - d/c midodrine   - transition from henny to levo it tolerates, maintain MAP > 65  - AM cortisol 17 (7/5), as per endocrine adrenal insufficiceny initially ruled out      #CAD s/p PCI x3, most recent stent 6/12/24, NURIA to pLAD   - resume plavix per GI and car  - c/w statin   - will obtain vBG with lactate   - EKG   - obtain cardiac enzymes     #PAD  # A-fib on eliquis    - s/p successful DCCV 10/31   - hold sotalol given hypotension 2/2 GI bleeds   - hold eliquis     PULM   #Intubated for airway protection prior to EGD/IR embolization  - will hold off on SBT if any further procedures are planned   - will reassess upon arrival to MICU   - CXR to confirm ETT placement     Renal/  #RAZIA   #ATN i.s.o hypotension  #metabolic acidosis   - f/u nephro   - trend BUN/Cr   - monitor Is and Os   - sodium bicarbonate 1300 BID     GI   #Upper GI Bleed   - s/p 5 units of RBC   - CT A/P 7/9 - Active bleeding in the third portion of the duodenum. Progression of liver metastases.  - EGD 7/9 showed esophagitis, One non-bleeding duodenal ulcer with a clean ulcer base (Arjun Class III). One oozing duodenal ulcer with spurting hemorrhage (Arjun Class Ia). Treatment not successful. Treated with bipolar cautery.  - currently undergoing IR embolization   - PPI gtt   - Hgb goal >7, transfuse if below   - hold plavix   - hold eliquis   - per GI - no plan for another scope unless rebleeds, keep intubated ON as of now and DO NOT put in NGT/OGT.   - per IR - no plan for procedure as of now   - continue to f/u with GI and IR     #Diarrhea   - c/w maintenaince fluids     ID  #leukocytosis   - will obtain BCx, UCx, Sputum Cx   - monitor off Abx for now   - GI PCR indeterminated for norovirus     ENDOCRINE  #adrenal insufficiency   - r/o given cortisol 17     HEMATOLOGY/ONCOLOGY   - neuroendocrine tumor   - was on lanreotide then had POD in liver and started on peptide receptor radiotherapy (PRRT)- typically given every 8 weeks for 4 doses. He last received it 10/20/2023   - PET 5/28/24 shows new somatostatin avid osseous lesions; decreased intensely avid right supradiaphragmatic and upper abdominal nodes, suspicious for mets    SKINS:   - Lines/Tubes - PIV, ETT    Assessment: 79 yo male with PMH of CAD s/p PCI x3 with most recent stent 6/12/24 on Plavix and eliquis , chronic Afib on eliquis, orthostatic hypotension on midodrine, PAD, neuroendocrine tumor with RT currently on hold, recent admission for dx cath on 6/24/24 who presents from PCP's office for hypotension despite taking AM midodrine dose on 7/2, admitted to medicine for symptomatic hypotension and RAZIA. Per chart, on 7/8 PM, pt was noted to have acute onset of melena x5 and coffee ground emesis x2-3. RRT was called on 7/9 AM for hypotension, hgb dropped from 9.2 to 7.4 (admission baseline 10-11), FOBT +, pt was started on PPI IV and transfused 1 unit of pRBC. His last eliquis was 6 pm 7/8 and plavix this AM. GI was consulted and pt is scheduled for EGD on 7/9 afternoon. MICU was consulted for uncontrolled bleeding during EGD. During EGD, pt became hypotensive and was given phenyephrine, had A-fib with RVR s/p amiodarone. Now s/p IR embolization, admitted to MICU for further moniring i.s.o hemorrhagic shock 2/2 GI bleed     Plan:     Neuro   # Baseline AOx3  - intubated   - on precedex   - ativan prn for agitation   - fentanyl prn for pain control     CVS  # hemorrhagic shock   - RRT called 7/9 for hypotension   -  2/2 to Upper GI bleeds, urgently took to EGD, found bleeding ulcer that was not well controlled, underwent IR empiric embolization 7/9  - d/c midodrine   - transition from henny to levo it tolerates, maintain MAP > 65  - AM cortisol 17 (7/5), as per endocrine adrenal insufficiceny initially ruled out      #CAD s/p PCI x3, most recent stent 6/12/24, NURIA to pLAD   - was planning to resume plavix per GI and cardiology, however KO tube was unable to be placed due to resistance   - start cangrelor infusion until Plavix can be given   - c/w statin   - EKG   - trend cardiac enzymes     #PAD  # A-fib on eliquis    - s/p successful DCCV 10/31   - hold sotalol given hypotension 2/2 GI bleeds   - hold eliquis     PULM   #Intubated for airway protection prior to EGD/IR embolization  - will hold off on SBT if any further procedures are planned   -AC 16/450/5/40    Renal/  #RAZIA   #ATN i.s.o hypotension  #metabolic acidosis   - f/u nephro   - trend BUN/Cr   - monitor Is and Os   - sodium bicarbonate 1300 BID   - IVF x2     GI   #Upper GI Bleed   - s/p 5 units of RBC   - CT A/P 7/9 - Active bleeding in the third portion of the duodenum. Progression of liver metastases.  - EGD 7/9 showed esophagitis, One non-bleeding duodenal ulcer with a clean ulcer base (Arjun Class III). One oozing duodenal ulcer with spurting hemorrhage (Arjun Class Ia). Treatment not successful. Treated with bipolar cautery.  - s/p IR embolization on 7/9  - PPI gtt   - Hgb goal >7, transfuse if below   - c/w cangrelor infusion until plavix can be resume   - hold eliquis   - per GI - no plan for another scope unless rebleeds  - per IR - no plan for procedure as of now   - continue to f/u with GI and IR     #Diarrhea   - c/w maintenaince fluids     ID  #leukocytosis   - f/u BCx, UCx, Sputum Cx   - monitor off Abx for now   - GI PCR indeterminated for norovirus   - per ID- no intervention needed regarding the PCR results    ENDOCRINE  #adrenal insufficiency   -7/5 cortisol 17   - repeat cortisol     HEMATOLOGY/ONCOLOGY   - neuroendocrine tumor   - was on lanreotide then had POD in liver and started on peptide receptor radiotherapy (PRRT)- typically given every 8 weeks for 4 doses. He last received it 10/20/2023   - PET 5/28/24 shows new somatostatin avid osseous lesions; decreased intensely avid right supradiaphragmatic and upper abdominal nodes, suspicious for mets    SKINS:   - Lines/Tubes - PIV, ETT

## 2024-07-10 NOTE — PROGRESS NOTE ADULT - NS ATTEND AMEND GEN_ALL_CORE FT
nurse report no diarrhea overnight. please rule out infectious caused for diarrhea. can consider octreotide for neuro endocrine tumor causing diarrhea and for bleed. follow up with GI

## 2024-07-10 NOTE — PROGRESS NOTE ADULT - SUBJECTIVE AND OBJECTIVE BOX
Patient is a 78y old  Male who presents with a chief complaint of hypotension (10 Jul 2024 06:48)    Pt seen and examined at bedside, intubated in MICU. Reviewed overnight events.  MEDICATIONS  (STANDING):  allopurinol 100 milliGRAM(s) Oral two times a day  ascorbic acid 500 milliGRAM(s) Oral daily  atorvastatin 80 milliGRAM(s) Oral at bedtime  chlorhexidine 0.12% Liquid 15 milliLiter(s) Oral Mucosa every 12 hours  dexMEDEtomidine Infusion 0.5 MICROgram(s)/kG/Hr (12.3 mL/Hr) IV Continuous <Continuous>  folic acid 1 milliGRAM(s) Oral daily  lidocaine   4% Patch 1 Patch Transdermal daily  multivitamin 1 Tablet(s) Oral daily  pantoprazole Infusion 8 mG/Hr (10 mL/Hr) IV Continuous <Continuous>  phenylephrine    Infusion 0.8 MICROgram(s)/kG/Min (29.6 mL/Hr) IV Continuous <Continuous>  sodium bicarbonate 1300 milliGRAM(s) Oral three times a day  thiamine 100 milliGRAM(s) Oral daily    MEDICATIONS  (PRN):  acetaminophen     Tablet .. 650 milliGRAM(s) Oral every 6 hours PRN Temp greater or equal to 38C (100.4F), Mild Pain (1 - 3)  fentaNYL    Injectable 50 MICROGram(s) IV Push every 4 hours PRN Severe Pain (7 - 10)  LORazepam   Injectable 2 milliGRAM(s) IV Push every 4 hours PRN Agitation  ondansetron Injectable 4 milliGRAM(s) IV Push every 8 hours PRN Nausea and/or Vomiting      Vital Signs Last 24 Hrs  T(C): 36.7 (10 Jul 2024 08:00), Max: 39.2 (09 Jul 2024 23:00)  T(F): 98.1 (10 Jul 2024 08:00), Max: 102.6 (09 Jul 2024 23:00)  HR: 60 (10 Jul 2024 11:00) (60 - 118)  BP: 92/54 (09 Jul 2024 13:27) (92/54 - 92/54)  BP(mean): 71 (09 Jul 2024 13:27) (71 - 71)  RR: 19 (10 Jul 2024 11:00) (16 - 34)  SpO2: 98% (10 Jul 2024 11:00) (98% - 100%)    Parameters below as of 10 Jul 2024 08:00  Patient On (Oxygen Delivery Method): ventilator    PE  NAD  Intubated  Anicteric, MMM  No c/c/e  No rash grossly                          12.0   11.38 )-----------( 179      ( 10 Jul 2024 06:51 )             35.2       07-10    143  |  109<H>  |  85<H>  ----------------------------<  138<H>  4.0   |  15<L>  |  2.75<H>    Ca    8.9      10 Jul 2024 09:50  Phos  5.9     07-10  Mg     1.6     07-10    TPro  5.3<L>  /  Alb  2.5<L>  /  TBili  0.5  /  DBili  x   /  AST  2867<H>  /  ALT  1093<H>  /  AlkPhos  260<H>  07-10

## 2024-07-11 ENCOUNTER — APPOINTMENT (OUTPATIENT)
Dept: CARDIOLOGY | Facility: CLINIC | Age: 79
End: 2024-07-11

## 2024-07-11 ENCOUNTER — RESULT REVIEW (OUTPATIENT)
Age: 79
End: 2024-07-11

## 2024-07-11 NOTE — CONSULT NOTE ADULT - SUBJECTIVE AND OBJECTIVE BOX
Date of Service: 07-12-24 @ 08:59    HPI: 77 yo male with PMH of CAD s/p PCI x3 with most recent stent 6/12/24 on Plavix and eliquis , chronic Afib on eliquis, orthostatic hypotension on midodrine, PAD, neuroendocrine tumor with RT currently on hold, recent admission for dx cath on 6/24/24 who presents from PCP's office for hypotension despite taking AM midodrine dose on 7/2, admitted to medicine for symptomatic hypotension and RAZIA. Per chart, on 7/8 PM, pt was noted to have acute onset of melena x5 and coffee ground emesis x2-3. RRT was called on 7/9 AM for hypotension, hgb dropped from 9.2 to 7.4 (admission baseline 10-11), FOBT +, pt was started on PPI IV and transfused 1 unit of pRBC. His last eliquis was 6 pm 7/8 and plavix this AM. GI was consulted and pt is scheduled for EGD on 7/9 afternoon. MICU was consulted for uncontrolled bleeding during EGD. During EGD, pt became hypotensive and was given phenyephrine, had A-fib with RVR s/p amiodarone. Now s/p IR embolization, admitted to MICU for further moniring i.s.o hemorrhagic shock 2/2 GI bleed. Palliative consulted for assistance with goc.         PERTINENT PM/SXH:   Hypertension    Hypercholesterolemia    PAD (peripheral artery disease)    Neuroendocrine carcinoma    Hepatic carcinoma    Atrial fibrillation and flutter    CAD (coronary artery disease)      S/P knee replacement, bilateral    S/P hip replacement    History of hernia repair    H/O laminectomy    History of lumbosacral spine surgery    History of cardioversion      FAMILY HISTORY:      ITEMS NOT CHECKED ARE NOT PRESENT    SOCIAL HISTORY:   Significant other/partner[ x]  Children[ x]  Gnosticism/Spirituality:  Substance hx:  [ ]   Tobacco hx:  [ ]   Alcohol hx: [ ]   Home Opioid hx:  [ ] I-Stop Reference No:  Living Situation: [x ]Home  [ ]Long term care  [ ]Rehab [ ]Other    ADVANCE DIRECTIVES:    DNR/MOLST  [ ]  Living Will  [ ]   DECISION MAKER(s):  [ ] Health Care Proxy(s)  [ x] Surrogate(s)  [ ] Guardian           Name(s): Phone Number(s): Yamilet Espinal     BASELINE (I)ADL(s) (prior to admission):  [place]: [ ]Total  [x ] Moderate [ ]Dependent    Allergies    No Known Allergies    Intolerances    MEDICATIONS  (STANDING):  allopurinol 100 milliGRAM(s) Oral two times a day  ascorbic acid 500 milliGRAM(s) Oral daily  atorvastatin 80 milliGRAM(s) Oral at bedtime  chlorhexidine 0.12% Liquid 15 milliLiter(s) Oral Mucosa every 12 hours  chlorhexidine 2% Cloths 1 Application(s) Topical <User Schedule>  dexMEDEtomidine Infusion 0.5 MICROgram(s)/kG/Hr (12.3 mL/Hr) IV Continuous <Continuous>  folic acid 1 milliGRAM(s) Oral daily  lidocaine   4% Patch 1 Patch Transdermal daily  multivitamin 1 Tablet(s) Oral daily  norepinephrine Infusion 0.05 MICROgram(s)/kG/Min (4.63 mL/Hr) IV Continuous <Continuous>  pantoprazole Infusion 8 mG/Hr (10 mL/Hr) IV Continuous <Continuous>  phenylephrine    Infusion 0.25 MICROgram(s)/kG/Min (9.25 mL/Hr) IV Continuous <Continuous>  piperacillin/tazobactam IVPB.. 3.375 Gram(s) IV Intermittent every 12 hours  potassium chloride   Solution 40 milliEquivalent(s) Oral every 4 hours  propofol Infusion 10 MICROgram(s)/kG/Min (5.92 mL/Hr) IV Continuous <Continuous>  sodium bicarbonate  Infusion 0.304 mEq/kG/Hr (200 mL/Hr) IV Continuous <Continuous>  thiamine 100 milliGRAM(s) Oral daily  vasopressin Infusion 0.04 Unit(s)/Min (6 mL/Hr) IV Continuous <Continuous>    MEDICATIONS  (PRN):  fentaNYL    Injectable 50 MICROGram(s) IV Push every 4 hours PRN Severe Pain (7 - 10)  LORazepam   Injectable 2 milliGRAM(s) IV Push every 4 hours PRN Agitation  ondansetron Injectable 4 milliGRAM(s) IV Push every 8 hours PRN Nausea and/or Vomiting    PRESENT SYMPTOMS: [ x]Unable to self-report see CPOT, PAINADs, RDOS  Source if other than patient:  [ ]Family   [ x]Team     Pain: [ ]yes [ ]no  QOL impact -   Location -                    Aggravating factors -  Quality -  Radiation -  Timing-  Severity (0-10 scale):  Minimal acceptable level (0-10 scale):       Dyspnea:                           [ ]Mild [ ]Moderate [ ]Severe  Anxiety:                             [ ]Mild [ ]Moderate [ ]Severe  Fatigue:                             [ ]Mild [ ]Moderate [ ]Severe  Nausea:                             [ ]Mild [ ]Moderate [ ]Severe  Loss of appetite:              [ ]Mild [ ]Moderate [ ]Severe  Constipation:                    [ ]Mild [ ]Moderate [ ]Severe    PCSSQ [Palliative Care Spiritual Screening Question]   Severity (0-10):  Score of 4 or > indicate consideration of Chaplaincy referral.  Chaplaincy Referral: [ ] yes [ ] refused [ ] following    Caregiver [place]? : [ ] yes [ ] no [ x] deferred:  Social work referral [ ] Patient & Family Centered Care Referral [ ]     Anticipatory Grief Present?: [ ] yes [ ] no  [x ] deferred: Palliative Social work referral [ ]  Patient & Family Centered Care Referral [ ]       Other Symptoms:  [ ]All other review of systems negative   [ x] Unable to obtain due to poor mentation    PHYSICAL EXAM:  Vital Signs Last 24 Hrs  T(C): 36.9 (12 Jul 2024 08:00), Max: 38.3 (11 Jul 2024 20:00)  T(F): 98.4 (12 Jul 2024 08:00), Max: 100.9 (11 Jul 2024 20:00)  HR: 114 (12 Jul 2024 08:56) (85 - 165)  BP: --  BP(mean): --  RR: 29 (12 Jul 2024 08:45) (20 - 38)  SpO2: 100% (12 Jul 2024 08:56) (93% - 100%)    Parameters below as of 12 Jul 2024 08:00  Patient On (Oxygen Delivery Method): ventilator    O2 Concentration (%): 30 I&O's Summary    11 Jul 2024 07:01  -  12 Jul 2024 07:00  --------------------------------------------------------  IN: 8414.9 mL / OUT: 1305 mL / NET: 7109.9 mL    12 Jul 2024 07:01  -  12 Jul 2024 08:59  --------------------------------------------------------  IN: 531 mL / OUT: 150 mL / NET: 381 mL        GENERAL:  [ ]Alert  [ ]Oriented x   [x ]Lethargic  [ ]Cachexia  [ ]Unarousable  [x]Verbal  [ ]Non-Verbal  Behavioral:   [ ]Anxiety  [ ]Delirium [ ]Agitation [ ]Other  HEENT:  [x]Normal   [ x]Dry mouth   [ x]ET Tube/Trach  [ ]Oral lesions  PULMONARY:   [x]Clear [ ]Tachypnea  [ ]Audible excessive secretions   [ ]Rhonchi        [ ]Right [ ]Left [ ]Bilateral  [ ]Crackles        [ ]Right [ ]Left [ ]Bilateral  [ ]Wheezing     [ ]Right [ ]Left [ ]Bilateral  [x ]Diminished BS [ ] Right [ ]Left [ x]Bilateral  CARDIOVASCULAR:    [x]Regular [ ]Irregular [ ]Tachy  [ ]Lavelle [ ]Murmur [ ]Other  GASTROINTESTINAL:  [x]Soft  [ ]Distended   [x]+BS  [x]Non tender [ ]Tender  [ ]PEG [x ]OGT/ NGT   Last BM:    GENITOURINARY:  [ ]Normal [ x]Incontinent   [ ]Oliguria/Anuria   [ ]Ivory  MUSCULOSKELETAL:   [ ]Normal   [x]Weakness  [ x]Bed/Wheelchair bound [ ]Edema  NEUROLOGIC:   [ ]No focal deficits  [ x] Cognitive impairment  [ ] Dysphagia [ ]Dysarthria [ ] Paresis [ ]Other   SKIN:   [x]Normal  [ ]Rash   [ ]Pressure ulcer(s) [ ]y [ ]n present on admission    CRITICAL CARE:  [ ] Shock Present  [ ]Septic [ ]Cardiogenic [ ]Neurologic [ ]Hypovolemic  [x ]  Vasopressors [ ]  Inotropes   [ ]Respiratory failure present [x ]Mechanical ventilation [ ]Non-invasive ventilatory support [ ]High flow  Mode: AC/ CMV (Assist Control/ Continuous Mandatory Ventilation), RR (machine): 16, TV (machine): 450, FiO2: 30, PEEP: 5, ITime: 1, MAP: 10, PIP: 27  [ ]Acute  [ ]Chronic [ ]Hypoxic  [ ]Hypercarbic [ ]Other  [ ]Other organ failure     LABS:                        10.6   10.16 )-----------( 88       ( 12 Jul 2024 07:34 )             29.9   07-12    142  |  107  |  96<H>  ----------------------------<  267<H>  3.2<L>   |  16<L>  |  2.87<H>    Ca    6.9<L>      12 Jul 2024 00:38  Phos  5.0     07-12  Mg     1.7     07-12    TPro  4.6<L>  /  Alb  2.1<L>  /  TBili  0.6  /  DBili  x   /  AST  1236<H>  /  ALT  544<H>  /  AlkPhos  241<H>  07-12  PT/INR - ( 12 Jul 2024 00:38 )   PT: 22.8 sec;   INR: 2.12 ratio         PTT - ( 12 Jul 2024 00:38 )  PTT:33.3 sec    Urinalysis Basic - ( 12 Jul 2024 00:38 )    Color: x / Appearance: x / SG: x / pH: x  Gluc: 267 mg/dL / Ketone: x  / Bili: x / Urobili: x   Blood: x / Protein: x / Nitrite: x   Leuk Esterase: x / RBC: x / WBC x   Sq Epi: x / Non Sq Epi: x / Bacteria: x      RADIOLOGY & ADDITIONAL STUDIES:  < from: Xray Abdomen 1 View PORTABLE -Urgent (Xray Abdomen 1 View PORTABLE -Urgent .) (07.11.24 @ 12:17) >    ACC: [id number] EXAM:  XR ABDOMEN PORTABLE URGENT 1V   ORDERED BY:  LUDWIG CABA     PROCEDURE DATE:  07/11/2024          INTERPRETATION:  CLINICAL INDICATION: No bowel movement. New NGT   placement. Evaluate for obstruction    TECHNIQUE: Frontal radiograph of the abdomen.    COMPARISON: CT abdomen and pelvis 7/9/2024    INTERPRETATION:    Thoracolumbar spinal hardware. Coil material overlies the right lower   quadrant.    Enteric tube overlies the mid abdomen and courses towards the pelvis,   however its tip is obscured by spinal hardware.    Nonobstructive bowel gas pattern..    IMPRESSION:    Enteric tube overlies the mid abdomen and courses towards the pelvis,   however its tip is obscured by spinal hardware.    Nonobstructive bowel gas pattern.    --- End of Report ---           ROSA HINOJOSA MD; Resident Radiologist  This document has been electronically signed.  ALISON REED MD; Attending Radiologist  This document has been electronically signed. Jul 11 2024 12:26PM    < end of copied text >    PROTEIN CALORIE MALNUTRITION PRESENT: [ ]mild [ ]moderate [ ]severe [ ]underweight [ ]morbid obesity  https://www.andeal.org/vault/2440/web/files/ONC/Table_Clinical%20Characteristics%20to%20Document%20Malnutrition-White%20JV%20et%20al%202012.pdf    Height (cm): 188 (07-09-24 @ 13:27), 188 (06-24-24 @ 13:43), 188 (06-12-24 @ 11:58)  Weight (kg): 98.7 (07-09-24 @ 13:27), 102.5 (06-24-24 @ 13:43), 102.1 (06-12-24 @ 11:58)  BMI (kg/m2): 27.9 (07-09-24 @ 13:27), 29 (06-24-24 @ 13:43), 28.9 (06-12-24 @ 11:58)    [x ]PPSV2 < or = to 30% [ ]significant weight loss  [ ]poor nutritional intake  [ ]anasarca[ ]Artificial Nutrition      Other REFERRALS:  [ ]Hospice  [ ]Child Life  [ ]Social Work  [ ]Case management [ ]Holistic Therapy     Care Coordination Assessment 201 [C. Provider] (07-04-24 @ 15:14)      Palliative Performance Scale:  http://npcrc.org/files/news/palliative_performance_scale_ppsv2.pdf  (Ctrl +  left click to view)  Respiratory Distress Observation Tool:  https://homecareinformation.net/handouts/hen/Respiratory_Distress_Observation_Scale.pdf (Ctrl +  left click to view)  PAINAD Score:  http://geriatrictoolkit.[place].[facility]/cog/painad.pdf (Ctrl +  left click to view)

## 2024-07-11 NOTE — CONSULT NOTE ADULT - PROBLEM SELECTOR PROBLEM 3
Acute hypoxic respiratory failure
Type 2 diabetes mellitus, without long-term current use of insulin
GIB (gastrointestinal bleeding)

## 2024-07-11 NOTE — PROGRESS NOTE ADULT - ASSESSMENT
77 yo male with PMH of CAD s/p PCI x3 with most recent stent 6/12/24 on Plavix, chronic Afib on eliquis, orthostatic hypotension on midodrine, PAD, 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    1. Pancreatic NET- metastatic   -- was on lanreotide then had POD in liver and started on peptide receptor radiotherapy (PRRT)- typically given every 8 weeks for 4 doses. He received one dose on 10/20/2023 and planned to get his 2nd dose at the end of December however his course was complicated by multiple hospitalizations that were noncancer related (decompensated heart failure).   -- 5/28/24 PET Dotatate (compared to 9/2023): several new somatostatin avid osseous lesions, some faintly sclerotic, suspicious for mets. Increased upper RP nodes, probably metastatic. Decreased intensely tracer avid right supradiaphragmatic and upper abdominal nodes, suspicious for metastasis. Redemonstrated intensely somatostatin avid bilobar hepatic lesions, consistent with metastases again morphologically difficult to delineate.   -- [place] [facility] [place] no plan for treatment inpatient     2. Hemorrhagic shock, diarrhea  - Monitoring orthostatics  - Follow up cardiology recommendations  - Per endocrine, adrenal insufficiency ruled out given AM cortisol of 17, continues midodrine  - GI PCR indeterminate for norovirus on repeat test  - Chromogranin level pending  - RRT 7/9 for Hypotension, shortness of breath, drop in hemoglobin.   - Found to have + FOBT, CT w/ bleed in duodenum. GI called, EGD 7/9 -> MICU consulted for uncontrolled bleeding followed by A-fib. S/p  IR embolization 7/9, intubated in MICU  - S/p multiple pRBC transfusions  - pending abdominal x-ray to r/o perforation/obstruction  - Once infection ruled out, may start octreotide 150 mcg BID    Christen Rosales NP  Hematology/ Oncology  [facility]  [phone] (office)  [phone] (alt office)  Evenings and weekends please call MD on call or office

## 2024-07-11 NOTE — PROGRESS NOTE ADULT - SUBJECTIVE AND OBJECTIVE BOX
Interventional Radiology Follow-Up Note    Patient seen and examined @ bedside. Unable to obtain appropriate HPI as patient is sedated and intubated.     This is a 78 year old Male s/p Empiric GDA Embolization on 7/9 in Interventional Radiology with Dr. Zuleta.      No complaint offered.    Medication:  cangrelor Infusion: (07-10)  norepinephrine Infusion: (07-10)  phenylephrine    Infusion: (07-09)  piperacillin/tazobactam IVPB.: (07-11)  piperacillin/tazobactam IVPB.-: (07-11)    Vitals:  T(F): 96.6, Max: 102.2 (23:00)  HR: 90  BP: --  RR: 31  SpO2: 95%    Physical Exam:  General: sedated   Respiratory: intubated  Abdomen: soft, nontender, nondistended   Extremities:  B/L cool to touch, Right groin clean, dry and intact, soft with no evidence of hematoma, doppler pedal and PT pulses, No pedal edema.       LABS:  Na: 143 (07-11 @ 07:58), 144 (07-10 @ 23:20), 141 (07-10 @ 17:59), 143 (07-10 @ 09:50), 142 (07-10 @ 00:16), 142 (07-09 @ 18:36), 140 (07-09 @ 09:02), 142 (07-09 @ 07:18)  K: 4.0 (07-11 @ 07:58), 4.0 (07-10 @ 23:20), 3.8 (07-10 @ 17:59), 4.0 (07-10 @ 09:50), 4.1 (07-10 @ 00:16), 4.2 (07-09 @ 18:36), 4.9 (07-09 @ 09:02), 4.4 (07-09 @ 07:18)  Cl: 109 (07-11 @ 07:58), 110 (07-10 @ 23:20), 110 (07-10 @ 17:59), 109 (07-10 @ 09:50), 108 (07-10 @ 00:16), 110 (07-09 @ 18:36), 109 (07-09 @ 09:02), 109 (07-09 @ 07:18)  CO2: 12 (07-11 @ 07:58), 14 (07-10 @ 23:20), 13 (07-10 @ 17:59), 15 (07-10 @ 09:50), 16 (07-10 @ 00:16), 17 (07-09 @ 18:36), 15 (07-09 @ 09:02), 15 (07-09 @ 07:18)  BUN: 96 (07-11 @ 07:58), 93 (07-10 @ 23:20), 90 (07-10 @ 17:59), 85 (07-10 @ 09:50), 84 (07-10 @ 00:16), 77 (07-09 @ 18:36), 79 (07-09 @ 09:02), 71 (07-09 @ 07:18)  Cr: 3.38 (07-11 @ 07:58), 3.12 (07-10 @ 23:20), 2.89 (07-10 @ 17:59), 2.75 (07-10 @ 09:50), 2.50 (07-10 @ 00:16), 2.07 (07-09 @ 18:36), 2.01 (07-09 @ 09:02), 1.74 (07-09 @ 07:18)  Glu: 181(07-11 @ 07:58), 129(07-10 @ 23:20), 136(07-10 @ 17:59), 138(07-10 @ 09:50), 146(07-10 @ 00:16), 160(07-09 @ 18:36), 196(07-09 @ 09:02), 182(07-09 @ 07:18)  Hgb: 10.1 (07-11 @ 07:59), 11.7 (07-10 @ 23:20), 11.2 (07-10 @ 17:59), 11.8 (07-10 @ 12:43), 12.0 (07-10 @ 06:51), 12.5 (07-10 @ 00:16), 11.4 (07-09 @ 18:29), 7.4 (07-09 @ 09:02), 7.4 (07-09 @ 07:37), 9.2 (07-09 @ 00:50)  Hct: 30.6 (07-11 @ 07:59), 33.0 (07-10 @ 23:20), 33.0 (07-10 @ 17:59), 33.8 (07-10 @ 12:43), 35.2 (07-10 @ 06:51), 35.6 (07-10 @ 00:16), 34.0 (07-09 @ 18:29), 22.8 (07-09 @ 09:02), 23.2 (07-09 @ 07:37), 28.4 (07-09 @ 00:50)  WBC: 9.15 (07-11 @ 07:59), 9.42 (07-10 @ 23:20), 9.73 (07-10 @ 17:59), 10.61 (07-10 @ 12:43), 11.38 (07-10 @ 06:51), 12.56 (07-10 @ 00:16), 12.77 (07-09 @ 18:29), 16.03 (07-09 @ 09:02), 15.80 (07-09 @ 07:37), 16.25 (07-09 @ 00:50)  Plt: 141 (07-11 @ 07:59), 183 (07-10 @ 23:20), 160 (07-10 @ 17:59), 175 (07-10 @ 12:43), 179 (07-10 @ 06:51), 193 (07-10 @ 00:16), 161 (07-09 @ 18:29), 314 (07-09 @ 09:02), 346 (07-09 @ 07:37), 361 (07-09 @ 00:50)  INR: 1.64 07-10-24 @ 23:20, 1.33 07-10-24 @ 00:16, 1.37 07-09-24 @ 18:29, 1.57 07-09-24 @ 10:22, 1.54 07-09-24 @ 00:50  PTT: 33.1 07-10-24 @ 23:20, 30.1 07-10-24 @ 00:16, 29.6 07-09-24 @ 18:29, 30.3 07-09-24 @ 10:22, 31.7 07-09-24 @ 00:50        LIVER FUNCTIONS - ( 11 Jul 2024 07:58 )  Alb: 2.6 g/dL / Pro: 5.2 g/dL / ALK PHOS: 270 U/L / ALT: 941 U/L / AST: 2532 U/L / GGT: x         ABG - ( 10 Jul 2024 23:15 )  pH, Arterial: 7.34  pH, Blood: x     /  pCO2: 28    /  pO2: 130   / HCO3: 15    / Base Excess: -9.2  /  SaO2: 99.6    Bilirubin Total: 0.7 mg/dL (07-11-24 @ 07:58)  Bilirubin Total: 0.6 mg/dL (07-10-24 @ 23:20)  Bilirubin Total: 0.6 mg/dL (07-10-24 @ 17:59)  Bilirubin Total: 0.5 mg/dL (07-10-24 @ 09:50)  Aspartate Aminotransferase (AST/SGOT): 2532 U/L (07-11-24 @ 07:58)  Alanine Aminotransferase (ALT/SGPT): 941 U/L (07-11-24 @ 07:58)  Aspartate Aminotransferase (AST/SGOT): 3507 U/L (07-10-24 @ 23:20)  Alanine Aminotransferase (ALT/SGPT): 1311 U/L (07-10-24 @ 23:20)  Aspartate Aminotransferase (AST/SGOT): 3794 U/L (07-10-24 @ 17:59)  Alanine Aminotransferase (ALT/SGPT): 1405 U/L (07-10-24 @ 17:59)  Aspartate Aminotransferase (AST/SGOT): 2867 U/L (07-10-24 @ 09:50)  Alanine Aminotransferase (ALT/SGPT): 1093 U/L (07-10-24 @ 09:50)  Aspartate Aminotransferase (AST/SGOT): 1936 U/L (07-10-24 @ 00:16)  Alanine Aminotransferase (ALT/SGPT): 622 U/L (07-10-24 @ 00:16)  Bilirubin Total: 0.7 mg/dL (07-11-24 @ 07:58)  Bilirubin Total: 0.6 mg/dL (07-10-24 @ 23:20)  Bilirubin Total: 0.6 mg/dL (07-10-24 @ 17:59)  Bilirubin Total: 0.5 mg/dL (07-10-24 @ 09:50)  Bilirubin Total: 0.5 mg/dL (07-10-24 @ 00:16)  Bilirubin Total: 0.3 mg/dL (07-09-24 @ 09:02)          Assessment/Plan: 78 year old male with PMH of CAD s/p PCI x3 s/p stent 6/12/24 on plavix, AFib on eliquis, NE tumor of liver (unknown primary) s/p RT, PAD, and orthostatic hypotension on midodrine presenting for lightheadedness and hypotension. He was sent in from outpatient office visit for complaint of lightheadedness and low bp to systolics in 80s despite midodrine. Patient with melena and abdominal pain. 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 RRT today. Patient taken to endoscopy with inability to control bleeding. No clip placed. Of note, patient has history of recent stent 6/12/24 requiring plavix and eliquis with last eliquis dose at 6 pm on 7/8 and plavix dose this morning. Patient now s/p empiric GDA embolization on 7/9 in IR with Dr. Zuleta.     - continue global management per primary team  - H/H 11.7/33---> 10.1/30 this AM  - monitor h/h; transfuse as needed; continue to trend H/H  - trend vs/labs  - Pt on Levo/Vaso, wean pressors as tolerated  - has not required additional blood products post embolization  - Right groin site c/d/i, soft nontender, no hematoma   - IR to continue to follow  - Rest of care per primary team       Please call IR at extension [phone] with any questions, concerns, or issues regarding above.        Also available on TEAMS

## 2024-07-11 NOTE — CONSULT NOTE ADULT - PROBLEM SELECTOR RECOMMENDATION 3
pt remains intubated sedated  defer to MICU team for ongoing care
s/p EGD and IR embolization  s/p 5u PRBCs  requiring pressor support  management per MICU

## 2024-07-11 NOTE — PROGRESS NOTE ADULT - CRITICAL CARE ATTENDING COMMENT
77 yo with CAD s/p PCI (most recent stent 6/12/24), afib (on Eliquis) with hypotension (on midodrine), neuroendocrine tumor admitted with hypotension; hospital course c/b GIB, now transferred to MICU with hemorrhagic shock, RAZIA, shock liver.     #neuro: intubated, sedated  #CV:  - hemorrhagic shock in the setting of GIB - s/p 5 U pRBC total. C/w volume resuscitation as appears hypovolemic on POCUS  -- possible component of vasoplegic/septic shock. Now on NE and Vaso  -- chronic hypotension of unclear etiology, thought to be orthostatic. On home midodrine. f/u repeat cortisol levels  -- CAD with multiple stents, most recent pLAD on 6/12/24. When able to get PO access, restart Lasix and stop cangrelor gtt.   -- trops and CKs are elevated. Continue trending, likely demand ischemia Cardiology input appreciated  #Resp: intubated for airway protection during EGD. Will keep intubated today  #GI:   -- massive GIB, s/p EGD s/p empiric GDA embolization on 7/9 in IR. C/w PPI gtt. Has not required any further transfusions  -- elevated LFTs 2/2 shock liver. Monitor  #ID: on empiric zosyn. f/u cultures  #Renal: RAZIA, likely 2/2 ATN. Monitor Cr and UO. Volume resuscitation with bicarb gtt  #Endo: monitor FS, check cortisol levels  #Heme: GI in the setting of Plavix and Eliquis. s/p 5 U pRBC. s/p EGD s/p GDA empbolization with IR. Monitor h/h. Given risk for stent thromobosis, on Cangrelor gtt. Switch to PO plavix when able  #GOC: full code. prognosis guarded.

## 2024-07-11 NOTE — PROGRESS NOTE ADULT - SUBJECTIVE AND OBJECTIVE BOX
INTERVAL HPI/OVERNIGHT EVENTS:    SUBJECTIVE: Patient seen and examined at bedside.     ROS: All negative except as listed above.    VITAL SIGNS:  ICU Vital Signs Last 24 Hrs  T(C): 36.5 (11 Jul 2024 04:00), Max: 39 (10 Jul 2024 23:00)  T(F): 97.7 (11 Jul 2024 04:00), Max: 102.2 (10 Jul 2024 23:00)  HR: 73 (11 Jul 2024 07:00) (60 - 149)  BP: --  BP(mean): --  ABP: 106/53 (11 Jul 2024 07:00) (93/43 - 151/65)  ABP(mean): 71 (11 Jul 2024 07:00) (55 - 93)  RR: 23 (11 Jul 2024 07:00) (19 - 32)  SpO2: 98% (11 Jul 2024 07:00) (78% - 100%)    O2 Parameters below as of 11 Jul 2024 00:00  Patient On (Oxygen Delivery Method): ventilator    O2 Concentration (%): 30      Mode: AC/ CMV (Assist Control/ Continuous Mandatory Ventilation), RR (machine): 16, TV (machine): 450, FiO2: 30, PEEP: 5, ITime: 1, MAP: 9, PIP: 21  Plateau pressure:   P/F ratio:     07-10 @ 07:01  -  07-11 @ 07:00  --------------------------------------------------------  IN: 5473.7 mL / OUT: 145 mL / NET: 5328.7 mL      CAPILLARY BLOOD GLUCOSE      POCT Blood Glucose.: 157 mg/dL (09 Jul 2024 08:39)      ECG: reviewed.    PHYSICAL EXAM:    GENERAL: NAD, lying in bed comfortably  HEAD:  Atraumatic, normocephalic  EYES: EOMI, PERRLA, conjunctiva and sclera clear  NECK: Supple, trachea midline, no JVD  HEART: Regular rate and rhythm, no murmurs, rubs, or gallops  LUNGS: Unlabored respirations.  Clear to auscultation bilaterally, no crackles, wheezing, or rhonchi  ABDOMEN: Soft, nontender, nondistended, +BS  EXTREMITIES: 2+ peripheral pulses bilaterally, cap refill<2 secs. No clubbing, cyanosis, or edema  NERVOUS SYSTEM:  A&Ox3, following commands, moving all extremities, no focal deficits   SKIN: No rashes or lesions    MEDICATIONS:  MEDICATIONS  (STANDING):  allopurinol 100 milliGRAM(s) Oral two times a day  ascorbic acid 500 milliGRAM(s) Oral daily  atorvastatin 80 milliGRAM(s) Oral at bedtime  cangrelor Infusion 0.75 MICROgram(s)/kG/Min (22.2 mL/Hr) IV Continuous <Continuous>  chlorhexidine 0.12% Liquid 15 milliLiter(s) Oral Mucosa every 12 hours  chlorhexidine 2% Cloths 1 Application(s) Topical <User Schedule>  dexMEDEtomidine Infusion 0.5 MICROgram(s)/kG/Hr (12.3 mL/Hr) IV Continuous <Continuous>  folic acid 1 milliGRAM(s) Oral daily  lidocaine   4% Patch 1 Patch Transdermal daily  multivitamin 1 Tablet(s) Oral daily  norepinephrine Infusion 0.05 MICROgram(s)/kG/Min (4.63 mL/Hr) IV Continuous <Continuous>  pantoprazole  Injectable 40 milliGRAM(s) IV Push two times a day  piperacillin/tazobactam IVPB.- 3.375 Gram(s) IV Intermittent once  piperacillin/tazobactam IVPB.. 3.375 Gram(s) IV Intermittent every 12 hours  propofol Infusion 10 MICROgram(s)/kG/Min (5.92 mL/Hr) IV Continuous <Continuous>  sodium bicarbonate 1300 milliGRAM(s) Oral three times a day  thiamine 100 milliGRAM(s) Oral daily  vasopressin Infusion 0.04 Unit(s)/Min (6 mL/Hr) IV Continuous <Continuous>    MEDICATIONS  (PRN):  acetaminophen     Tablet .. 650 milliGRAM(s) Oral every 6 hours PRN Temp greater or equal to 38C (100.4F), Mild Pain (1 - 3)  fentaNYL    Injectable 50 MICROGram(s) IV Push every 4 hours PRN Severe Pain (7 - 10)  LORazepam   Injectable 2 milliGRAM(s) IV Push every 4 hours PRN Agitation  ondansetron Injectable 4 milliGRAM(s) IV Push every 8 hours PRN Nausea and/or Vomiting      ALLERGIES:  Allergies    No Known Allergies    Intolerances        LABS:                        11.7   9.42  )-----------( 183      ( 10 Jul 2024 23:20 )             33.0     07-10    144  |  110<H>  |  93<H>  ----------------------------<  129<H>  4.0   |  14<L>  |  3.12<H>    Ca    8.6      10 Jul 2024 23:20  Phos  6.2     07-10  Mg     2.0     07-10    TPro  5.3<L>  /  Alb  2.4<L>  /  TBili  0.6  /  DBili  x   /  AST  3507<H>  /  ALT  1311<H>  /  AlkPhos  309<H>  07-10    PT/INR - ( 10 Jul 2024 23:20 )   PT: 17.0 sec;   INR: 1.64 ratio         PTT - ( 10 Jul 2024 23:20 )  PTT:33.1 sec  Urinalysis Basic - ( 10 Jul 2024 23:20 )    Color: x / Appearance: x / SG: x / pH: x  Gluc: 129 mg/dL / Ketone: x  / Bili: x / Urobili: x   Blood: x / Protein: x / Nitrite: x   Leuk Esterase: x / RBC: x / WBC x   Sq Epi: x / Non Sq Epi: x / Bacteria: x      ABG:  pH, Arterial: 7.34 (07-10-24 @ 23:15)  pCO2, Arterial: 28 mmHg (07-10-24 @ 23:15)  pO2, Arterial: 130 mmHg (07-10-24 @ 23:15)  pH, Arterial: 7.36 (07-10-24 @ 17:44)  pCO2, Arterial: 29 mmHg (07-10-24 @ 17:44)  pO2, Arterial: 156 mmHg (07-10-24 @ 17:44)      vBG:    Micro:        RADIOLOGY & ADDITIONAL TESTS: Reviewed.   INTERVAL HPI/OVERNIGHT EVENTS: Started vasopressin overnight due to hypotension. Febrile ON, started on Zosyn     SUBJECTIVE: Patient seen and examined at bedside.     ROS: Unable to assess given pt's clinical condition.    VITAL SIGNS:  ICU Vital Signs Last 24 Hrs  T(C): 36.5 (11 Jul 2024 04:00), Max: 39 (10 Jul 2024 23:00)  T(F): 97.7 (11 Jul 2024 04:00), Max: 102.2 (10 Jul 2024 23:00)  HR: 73 (11 Jul 2024 07:00) (60 - 149)  BP: --  BP(mean): --  ABP: 106/53 (11 Jul 2024 07:00) (93/43 - 151/65)  ABP(mean): 71 (11 Jul 2024 07:00) (55 - 93)  RR: 23 (11 Jul 2024 07:00) (19 - 32)  SpO2: 98% (11 Jul 2024 07:00) (78% - 100%)    O2 Parameters below as of 11 Jul 2024 00:00  Patient On (Oxygen Delivery Method): ventilator    O2 Concentration (%): 30      Mode: AC/ CMV (Assist Control/ Continuous Mandatory Ventilation), RR (machine): 16, TV (machine): 450, FiO2: 30, PEEP: 5, ITime: 1, MAP: 9, PIP: 21  Plateau pressure:   P/F ratio:     07-10 @ 07:01  -  07-11 @ 07:00  --------------------------------------------------------  IN: 5473.7 mL / OUT: 145 mL / NET: 5328.7 mL      CAPILLARY BLOOD GLUCOSE      POCT Blood Glucose.: 157 mg/dL (09 Jul 2024 08:39)      ECG: reviewed.    PHYSICAL EXAM:    GENERAL: NAD, lying in bed comfortably  HEAD:  Atraumatic, normocephalic  EYES: Eyes closed   NECK: Supple, trachea midline, no JVD  HEART: Regular rate and rhythm, no murmurs, rubs, or gallops  LUNGS: Unlabored respirations  ABDOMEN: Soft, nontender  EXTREMITIES:  No clubbing, cyanosis, or edema  NERVOUS SYSTEM:  Sedated  SKIN: No rashes or lesions    MEDICATIONS:  MEDICATIONS  (STANDING):  allopurinol 100 milliGRAM(s) Oral two times a day  ascorbic acid 500 milliGRAM(s) Oral daily  atorvastatin 80 milliGRAM(s) Oral at bedtime  cangrelor Infusion 0.75 MICROgram(s)/kG/Min (22.2 mL/Hr) IV Continuous <Continuous>  chlorhexidine 0.12% Liquid 15 milliLiter(s) Oral Mucosa every 12 hours  chlorhexidine 2% Cloths 1 Application(s) Topical <User Schedule>  dexMEDEtomidine Infusion 0.5 MICROgram(s)/kG/Hr (12.3 mL/Hr) IV Continuous <Continuous>  folic acid 1 milliGRAM(s) Oral daily  lidocaine   4% Patch 1 Patch Transdermal daily  multivitamin 1 Tablet(s) Oral daily  norepinephrine Infusion 0.05 MICROgram(s)/kG/Min (4.63 mL/Hr) IV Continuous <Continuous>  pantoprazole  Injectable 40 milliGRAM(s) IV Push two times a day  piperacillin/tazobactam IVPB.- 3.375 Gram(s) IV Intermittent once  piperacillin/tazobactam IVPB.. 3.375 Gram(s) IV Intermittent every 12 hours  propofol Infusion 10 MICROgram(s)/kG/Min (5.92 mL/Hr) IV Continuous <Continuous>  sodium bicarbonate 1300 milliGRAM(s) Oral three times a day  thiamine 100 milliGRAM(s) Oral daily  vasopressin Infusion 0.04 Unit(s)/Min (6 mL/Hr) IV Continuous <Continuous>    MEDICATIONS  (PRN):  acetaminophen     Tablet .. 650 milliGRAM(s) Oral every 6 hours PRN Temp greater or equal to 38C (100.4F), Mild Pain (1 - 3)  fentaNYL    Injectable 50 MICROGram(s) IV Push every 4 hours PRN Severe Pain (7 - 10)  LORazepam   Injectable 2 milliGRAM(s) IV Push every 4 hours PRN Agitation  ondansetron Injectable 4 milliGRAM(s) IV Push every 8 hours PRN Nausea and/or Vomiting      ALLERGIES:  Allergies    No Known Allergies    Intolerances        LABS:                        11.7   9.42  )-----------( 183      ( 10 Jul 2024 23:20 )             33.0     07-10    144  |  110<H>  |  93<H>  ----------------------------<  129<H>  4.0   |  14<L>  |  3.12<H>    Ca    8.6      10 Jul 2024 23:20  Phos  6.2     07-10  Mg     2.0     07-10    TPro  5.3<L>  /  Alb  2.4<L>  /  TBili  0.6  /  DBili  x   /  AST  3507<H>  /  ALT  1311<H>  /  AlkPhos  309<H>  07-10    PT/INR - ( 10 Jul 2024 23:20 )   PT: 17.0 sec;   INR: 1.64 ratio         PTT - ( 10 Jul 2024 23:20 )  PTT:33.1 sec  Urinalysis Basic - ( 10 Jul 2024 23:20 )    Color: x / Appearance: x / SG: x / pH: x  Gluc: 129 mg/dL / Ketone: x  / Bili: x / Urobili: x   Blood: x / Protein: x / Nitrite: x   Leuk Esterase: x / RBC: x / WBC x   Sq Epi: x / Non Sq Epi: x / Bacteria: x      ABG:  pH, Arterial: 7.34 (07-10-24 @ 23:15)  pCO2, Arterial: 28 mmHg (07-10-24 @ 23:15)  pO2, Arterial: 130 mmHg (07-10-24 @ 23:15)  pH, Arterial: 7.36 (07-10-24 @ 17:44)  pCO2, Arterial: 29 mmHg (07-10-24 @ 17:44)  pO2, Arterial: 156 mmHg (07-10-24 @ 17:44)      vBG:    Micro:        RADIOLOGY & ADDITIONAL TESTS: Reviewed.

## 2024-07-11 NOTE — PROGRESS NOTE ADULT - SUBJECTIVE AND OBJECTIVE BOX
Patient is a 78y old  Male who presents with a chief complaint of hypotension (11 Jul 2024 11:02)    Patient seen and examined at bedside  Pt remains sedated, intubated and on pressors    MEDICATIONS  (STANDING):  allopurinol 100 milliGRAM(s) Oral two times a day  ascorbic acid 500 milliGRAM(s) Oral daily  atorvastatin 80 milliGRAM(s) Oral at bedtime  chlorhexidine 0.12% Liquid 15 milliLiter(s) Oral Mucosa every 12 hours  chlorhexidine 2% Cloths 1 Application(s) Topical <User Schedule>  clopidogrel Tablet 75 milliGRAM(s) Enteral Tube daily  dexMEDEtomidine Infusion 0.5 MICROgram(s)/kG/Hr (12.3 mL/Hr) IV Continuous <Continuous>  folic acid 1 milliGRAM(s) Oral daily  lidocaine   4% Patch 1 Patch Transdermal daily  multivitamin 1 Tablet(s) Oral daily  norepinephrine Infusion 0.05 MICROgram(s)/kG/Min (4.63 mL/Hr) IV Continuous <Continuous>  pantoprazole  Injectable 40 milliGRAM(s) IV Push two times a day  piperacillin/tazobactam IVPB.. 3.375 Gram(s) IV Intermittent every 12 hours  propofol Infusion 10 MICROgram(s)/kG/Min (5.92 mL/Hr) IV Continuous <Continuous>  sodium bicarbonate  Infusion 0.304 mEq/kG/Hr (200 mL/Hr) IV Continuous <Continuous>  thiamine 100 milliGRAM(s) Oral daily  vasopressin Infusion 0.04 Unit(s)/Min (6 mL/Hr) IV Continuous <Continuous>    MEDICATIONS  (PRN):  acetaminophen     Tablet .. 650 milliGRAM(s) Oral every 6 hours PRN Temp greater or equal to 38C (100.4F), Mild Pain (1 - 3)  fentaNYL    Injectable 50 MICROGram(s) IV Push every 4 hours PRN Severe Pain (7 - 10)  LORazepam   Injectable 2 milliGRAM(s) IV Push every 4 hours PRN Agitation  ondansetron Injectable 4 milliGRAM(s) IV Push every 8 hours PRN Nausea and/or Vomiting      Vital Signs Last 24 Hrs  T(C): 37.4 (11 Jul 2024 12:00), Max: 39 (10 Jul 2024 23:00)  T(F): 99.3 (11 Jul 2024 12:00), Max: 102.2 (10 Jul 2024 23:00)  HR: 124 (11 Jul 2024 15:45) (73 - 149)  BP: --  BP(mean): --  RR: 29 (11 Jul 2024 15:45) (19 - 38)  SpO2: 99% (11 Jul 2024 15:45) (78% - 100%)    Parameters below as of 11 Jul 2024 08:00  Patient On (Oxygen Delivery Method): ventilator    O2 Concentration (%): 30    PE  frail  sedated/intubated/on pressor support  Anicteric  Abd soft, NT, ND  No c/c                        10.1   9.15  )-----------( 141      ( 11 Jul 2024 07:59 )             30.6       07-11    143  |  109<H>  |  96<H>  ----------------------------<  181<H>  4.0   |  12<L>  |  3.38<H>    Ca    8.0<L>      11 Jul 2024 07:58  Phos  7.1     07-11  Mg     2.0     07-11    TPro  5.2<L>  /  Alb  2.6<L>  /  TBili  0.7  /  DBili  x   /  AST  2532<H>  /  ALT  941<H>  /  AlkPhos  270<H>  07-11

## 2024-07-11 NOTE — PROGRESS NOTE ADULT - ASSESSMENT
Assessment: 77 yo male with PMH of CAD s/p PCI x3 with most recent stent 6/12/24 on Plavix and eliquis , chronic Afib on eliquis, orthostatic hypotension on midodrine, PAD, neuroendocrine tumor with RT currently on hold, recent admission for dx cath on 6/24/24 who presents from PCP's office for hypotension despite taking AM midodrine dose on 7/2, admitted to medicine for symptomatic hypotension and RAZIA. Per chart, on 7/8 PM, pt was noted to have acute onset of melena x5 and coffee ground emesis x2-3. RRT was called on 7/9 AM for hypotension, hgb dropped from 9.2 to 7.4 (admission baseline 10-11), FOBT +, pt was started on PPI IV and transfused 1 unit of pRBC. His last eliquis was 6 pm 7/8 and plavix this AM. GI was consulted and pt is scheduled for EGD on 7/9 afternoon. MICU was consulted for uncontrolled bleeding during EGD. During EGD, pt became hypotensive and was given phenyephrine, had A-fib with RVR s/p amiodarone. Now s/p IR embolization, admitted to MICU for further moniring i.s.o hemorrhagic shock 2/2 GI bleed     Plan:     Neuro   # Baseline AOx3  - intubated   - on precedex   - ativan prn for agitation   - fentanyl prn for pain control     CVS  # hemorrhagic shock   - RRT called 7/9 for hypotension   -  2/2 to Upper GI bleeds, urgently took to EGD, found bleeding ulcer that was not well controlled, underwent IR empiric embolization 7/9  - d/c midodrine   - transition from henny to levo it tolerates, maintain MAP > 65  - AM cortisol 17 (7/5), as per endocrine adrenal insufficiceny initially ruled out      #CAD s/p PCI x3, most recent stent 6/12/24, NURIA to pLAD   - was planning to resume plavix per GI and cardiology, however KO tube was unable to be placed due to resistance   - start cangrelor infusion until Plavix can be given   - c/w statin   - EKG   - trend cardiac enzymes     #PAD  # A-fib on eliquis    - s/p successful DCCV 10/31   - hold sotalol given hypotension 2/2 GI bleeds   - hold eliquis     PULM   #Intubated for airway protection prior to EGD/IR embolization  - will hold off on SBT if any further procedures are planned   -AC 16/450/5/40    Renal/  #RAZIA   #ATN i.s.o hypotension  #metabolic acidosis   - f/u nephro   - trend BUN/Cr   - monitor Is and Os   - sodium bicarbonate 1300 BID   - IVF x2     GI   #Upper GI Bleed   - s/p 5 units of RBC   - CT A/P 7/9 - Active bleeding in the third portion of the duodenum. Progression of liver metastases.  - EGD 7/9 showed esophagitis, One non-bleeding duodenal ulcer with a clean ulcer base (Arjun Class III). One oozing duodenal ulcer with spurting hemorrhage (Arjun Class Ia). Treatment not successful. Treated with bipolar cautery.  - s/p IR embolization on 7/9  - PPI gtt   - Hgb goal >7, transfuse if below   - c/w cangrelor infusion until plavix can be resume   - hold eliquis   - per GI - no plan for another scope unless rebleeds  - per IR - no plan for procedure as of now   - continue to f/u with GI and IR     #Diarrhea   - c/w maintenaince fluids     ID  #leukocytosis   - f/u BCx, UCx, Sputum Cx   - monitor off Abx for now   - GI PCR indeterminated for norovirus   - per ID- no intervention needed regarding the PCR results    ENDOCRINE  #adrenal insufficiency   -7/5 cortisol 17   - repeat cortisol     HEMATOLOGY/ONCOLOGY   - neuroendocrine tumor   - was on lanreotide then had POD in liver and started on peptide receptor radiotherapy (PRRT)- typically given every 8 weeks for 4 doses. He last received it 10/20/2023   - PET 5/28/24 shows new somatostatin avid osseous lesions; decreased intensely avid right supradiaphragmatic and upper abdominal nodes, suspicious for mets    SKINS:   - Lines/Tubes - PIV, ETT    Assessment: 79 yo male with PMH of CAD s/p PCI x3 with most recent stent 6/12/24 on Plavix and eliquis , chronic Afib on eliquis, orthostatic hypotension on midodrine, PAD, neuroendocrine tumor with RT currently on hold, recent admission for dx cath on 6/24/24 who presents from PCP's office for hypotension despite taking AM midodrine dose on 7/2, admitted to medicine for symptomatic hypotension and RAZIA. Per chart, on 7/8 PM, pt was noted to have acute onset of melena x5 and coffee ground emesis x2-3. RRT was called on 7/9 AM for hypotension, hgb dropped from 9.2 to 7.4 (admission baseline 10-11), FOBT +, pt was started on PPI IV and transfused 1 unit of pRBC. His last eliquis was 6 pm 7/8 and plavix this AM. GI was consulted and pt is scheduled for EGD on 7/9 afternoon. MICU was consulted for uncontrolled bleeding during EGD. During EGD, pt became hypotensive and was given phenyephrine, had A-fib with RVR s/p amiodarone. Now s/p IR embolization, admitted to MICU for further moniring i.s.o hemorrhagic shock 2/2 GI bleed     Plan:     Neuro   # Baseline AOx3  - intubated   - on precedex   - ativan prn for agitation   - fentanyl prn for pain control     CVS  # hemorrhagic shock   - RRT called 7/9 for hypotension   -  2/2 to Upper GI bleeds, urgently took to EGD, found bleeding ulcer that was not well controlled, underwent IR empiric embolization 7/9  - d/c midodrine   - on vasopressin and levo, leans as tolerated, maintain MAP > 65  - AM cortisol 17 (7/5), AM cortisol 37.1 (7/11)    #CAD s/p PCI x3, most recent stent 6/12/24, NURIA to pLAD   - dc cangrelor infusion  - start Plavix after KO tube placement   - c/w statin   - EKG   - trend cardiac enzymes     #PAD  # A-fib on eliquis    - s/p successful DCCV 10/31   - hold sotalol given hypotension 2/2 GI bleeds   - hold eliquis     PULM   #Intubated for airway protection prior to EGD/IR embolization  - will hold off on SBT if any further procedures are planned   -AC 16/450/5/40    Renal/  #RAZIA   #ATN i.s.o hypotension  #metabolic acidosis   - f/u nephro   - trend BUN/Cr   - monitor Is and Os   - start D5 with sodium bicarbonate     GI   #Upper GI Bleed   - s/p 5 units of RBC   - CT A/P 7/9 - Active bleeding in the third portion of the duodenum. Progression of liver metastases.  - EGD 7/9 showed esophagitis, One non-bleeding duodenal ulcer with a clean ulcer base (Arjun Class III). One oozing duodenal ulcer with spurting hemorrhage (Arjun Class Ia). Treatment not successful. Treated with bipolar cautery.  - s/p IR embolization on 7/9  - per GI recs, change PPI gtt to PPI IV BID  - Hgb goal >7, transfuse if below   - per GI, plavix can be started through KO tube  - hold eliquis   - per GI - no plan for another scope unless rebleeds  - per IR - no plan for procedure as of now   - continue to f/u with GI and IR     #Diarrhea   - c/w maintenaince fluids     ID  #leukocytosis   - f/u BCx, UCx, Sputum Cx   - Start zosyn (7/11 - )   - GI PCR indeterminated for norovirus   - per ID- no intervention needed regarding the PCR results    ENDOCRINE  #adrenal insufficiency   -7/5 cortisol 17   - 7/11 cortisol 37.1     HEMATOLOGY/ONCOLOGY   - neuroendocrine tumor   - was on lanreotide then had POD in liver and started on peptide receptor radiotherapy (PRRT)- typically given every 8 weeks for 4 doses. He last received it 10/20/2023   - PET 5/28/24 shows new somatostatin avid osseous lesions; decreased intensely avid right supradiaphragmatic and upper abdominal nodes, suspicious for mets    SKINS:   - Lines/Tubes - PIV, ETT, cordis    Ethics:   - Full Code

## 2024-07-11 NOTE — CHART NOTE - NSCHARTNOTEFT_GEN_A_CORE
: Angela Hernandez      INDICATION: Shock      PROCEDURE:    [ x ] LIMITED ECHO    [ x ] LIMITED CHEST      FINDINGS:    Overall normal LV contractility, no gross SWMAs, no pericardial effusion. Small LV cavity in systole and diastole, near-effacement of LV cavity in end-systole. No setpal flattening, normal RV size. IVS slim and collapsible. Bilateral A-line pattern.      INTERPRETATION:    Hypovolemia as main cause of patient's hypotension.      Images stored on [employer]

## 2024-07-11 NOTE — PROGRESS NOTE ADULT - ASSESSMENT
77 yo male with PMH of CAD s/p PCI x3 with most recent stent 6/12/24 on Plavix, chronic Afib on eliquis, orthostatic hypotension on midodrine, PAD, 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 Patient states since discharge on this past Saturday, he continued to feel ongoing generalized weakness and dizziness with positional changes despite compliance with home midodrine 15mg TID and holding torsemide as instructed as of day PTA . Admits to poor PO intake of fluids/solute due to ongoing issues with poor appetite and nausea with meals which is not new. In the ED, vitals notable for SBP 92-11s. Labs notable for elevated BUN/Cr 31/1.59 (from 30/1.18 on day of discharge 6/29/24). CXR with clear lungs. Abd US with innumerable intrahepatic echogenic masses consistent with known metastatic neuroendocrine tumor. Admitted to medicine for symptomatic hypotension and RAZIA.      1- RAZIA   2- orthostatic hypotension   3- hx chf   4- pericarditis   5- diarrhea       RAZIA in setting of hypotension, anemia -> GIB, hemorrhagic shock  received CT IV contrast, and emergently went to IR for mesenteric embolization 7/9  now in oliguric ATN  creatinine is continuing to worsen.   hypotension, on rising doses of pressors, received albumin 500cc overnight.   acidosis, shock liver and elevated cpk  d/w icu team. add sodium bicarb drip 150 meq in D5W 1L @ 200 cc/hr  strict I/O  monitor creatinine closely  d/w icu team when seen

## 2024-07-11 NOTE — PROGRESS NOTE ADULT - SUBJECTIVE AND OBJECTIVE BOX
DATE OF SERVICE: 07-11-24 @ 17:08    Patient is a 78y old  Male who presents with a chief complaint of hypotension (11 Jul 2024 16:38)      INTERVAL HISTORY: Intubated and sedated.     REVIEW OF SYSTEMS: Unable to participate in ROS  CONSTITUTIONAL: No weakness  EYES/ENT: No visual changes;  No throat pain   NECK: No pain or stiffness  RESPIRATORY: No cough, wheezing; No shortness of breath  CARDIOVASCULAR: No chest pain or palpitations  GASTROINTESTINAL: No abdominal  pain. No nausea, vomiting, or hematemesis  GENITOURINARY: No dysuria, frequency or hematuria  NEUROLOGICAL: No stroke like symptoms  SKIN: No rashes    TELEMETRY Personally reviewed: SR/ST   	  MEDICATIONS:  norepinephrine Infusion 0.05 MICROgram(s)/kG/Min IV Continuous <Continuous>        PHYSICAL EXAM:  T(C): 38.1 (07-11-24 @ 17:00), Max: 39 (07-10-24 @ 23:00)  HR: 122 (07-11-24 @ 17:00) (73 - 149)  BP: --  RR: 34 (07-11-24 @ 17:00) (19 - 38)  SpO2: 99% (07-11-24 @ 17:00) (78% - 100%)  Wt(kg): --  I&O's Summary    10 Jul 2024 07:01  -  11 Jul 2024 07:00  --------------------------------------------------------  IN: 5473.7 mL / OUT: 145 mL / NET: 5328.7 mL    11 Jul 2024 07:01  -  11 Jul 2024 17:08  --------------------------------------------------------  IN: 1882.2 mL / OUT: 150 mL / NET: 1732.2 mL          Appearance: In no distress	  HEENT:    PERRL, EOMI	  Cardiovascular:  S1 S2, No JVD  Respiratory: Lungs clear to auscultation	  Gastrointestinal:  Soft, Non-tender, + BS	  Vascularature:  No edema of LE  Psychiatric: Appropriate affect   Neuro: no acute focal deficits                               9.5    8.75  )-----------( 138      ( 11 Jul 2024 16:09 )             28.9     07-11    141  |  105  |  99<H>  ----------------------------<  268<H>  4.1   |  14<L>  |  3.21<H>    Ca    7.7<L>      11 Jul 2024 16:09  Phos  7.0     07-11  Mg     1.8     07-11    TPro  5.0<L>  /  Alb  2.4<L>  /  TBili  0.6  /  DBili  x   /  AST  1742<H>  /  ALT  699<H>  /  AlkPhos  248<H>  07-11        Labs personally reviewed      ASSESSMENT/PLAN: 	      78-year-old male multiple medical problems history of hepatocellular carcinoma status post radiation therapy, AF s/p DCCV on Eliquis who was found to be hypotensive following NST. Patient has reported general weakness, fatigue, lightheadedness since being discharged from hospital. Reports mild chest discomfort in midsternal region. Reports dyspnea with minimal exertion. Also reports significant lack of appetite and poor PO intake. No dark or bloody stool, nausea or vomiting.       Problem/Plan - 1:  ·  Problem: Hypotension  ·  Plan: Continue to hold home torsemide and Jardiance.   - Patient presents with hypotension and also RAZIA. Has known orthostatic hypotension.   - Patient and wife report decreased PO intake likely 2/2 malignancy.  - Appreciate PT recs  - Orthos still positive: s/p fluids with improvement in degree of orthostasis  - Increase Midodrine to 20mg PO TID   - Appreciate Endocrine recommendations to r/o adrenal insufficiency  - Plan to start Northera if insurance approves   - 7/6 pt states he walked to the bathroom with no dizziness or lightheadedness  - c/w IVF given diarrhea with minimal PO intake  - GIB 7/9, s/p 4 units prbc, IR for mesenteric embolization, now in MICU on pressors     Problem/Plan - 2:  ·  Problem: CAD.   ·  Plan: Recent PCI to pLAD in June 2023  - ECG non-ischemic  - c/w Plavix and statin  - can start cangrelor gtt in place of Plavix if needed     Problem/Plan - 3:  ·  Problem: Atrial Fibrillation  - s/p succesful DCCV 10/31  - Hold Eliquis 5mg BID given GIB  - C/w of Sotalol 40mg PO daily (qTC wnl)    Problem/Plan - 4:  ·  Problem: H/O Pericarditis.   ·  Plan: Chest pain now improved since last admission.  -  d/c colchicine 0.6mg PO daily    Problem/Plan - 4:  ·  Problem: Preop Risk Stratification.   ·  Plan: Patient is moderate risk for low risk, urgent endoscopy.  No cardiac contraindication to proceeed.  Should continue Plavix/Cangleror given recent Stent.          Sulma Espinosa, ROSIO-ESTEFANI Hayes DO [facility]  Cardiovascular Medicine  [address] [place] [address], [address]  Available through call or text on [employer]  Office: [phone]

## 2024-07-11 NOTE — PROGRESS NOTE ADULT - SUBJECTIVE AND OBJECTIVE BOX
Interval Events:   Patient intubated, sedated, on high dose levophed/vaso. No further melena reported.   Hgb dropped from 11.7 to 10.1 in past 7 hours. Started on cangrelor yesterday for concern for stent stenosis.    ROS:   12 point review of systems performed and negative except otherwise noted in HPI.    Hospital Medications:  acetaminophen     Tablet .. 650 milliGRAM(s) Oral every 6 hours PRN  allopurinol 100 milliGRAM(s) Oral two times a day  ascorbic acid 500 milliGRAM(s) Oral daily  atorvastatin 80 milliGRAM(s) Oral at bedtime  cangrelor Infusion 0.75 MICROgram(s)/kG/Min IV Continuous <Continuous>  chlorhexidine 0.12% Liquid 15 milliLiter(s) Oral Mucosa every 12 hours  chlorhexidine 2% Cloths 1 Application(s) Topical <User Schedule>  dexMEDEtomidine Infusion 0.5 MICROgram(s)/kG/Hr IV Continuous <Continuous>  fentaNYL    Injectable 50 MICROGram(s) IV Push every 4 hours PRN  folic acid 1 milliGRAM(s) Oral daily  lidocaine   4% Patch 1 Patch Transdermal daily  LORazepam   Injectable 2 milliGRAM(s) IV Push every 4 hours PRN  multivitamin 1 Tablet(s) Oral daily  norepinephrine Infusion 0.05 MICROgram(s)/kG/Min IV Continuous <Continuous>  ondansetron Injectable 4 milliGRAM(s) IV Push every 8 hours PRN  pantoprazole  Injectable 40 milliGRAM(s) IV Push two times a day  piperacillin/tazobactam IVPB.. 3.375 Gram(s) IV Intermittent every 12 hours  propofol Infusion 10 MICROgram(s)/kG/Min IV Continuous <Continuous>  sodium bicarbonate 1300 milliGRAM(s) Oral three times a day  sodium bicarbonate  Infusion 0.304 mEq/kG/Hr IV Continuous <Continuous>  thiamine 100 milliGRAM(s) Oral daily  vasopressin Infusion 0.04 Unit(s)/Min IV Continuous <Continuous>      PHYSICAL EXAM:   Vital Signs:  Vital Signs Last 24 Hrs  T(C): 35.9 (11 Jul 2024 08:00), Max: 39 (10 Jul 2024 23:00)  T(F): 96.6 (11 Jul 2024 08:00), Max: 102.2 (10 Jul 2024 23:00)  HR: 97 (11 Jul 2024 11:00) (68 - 149)  BP: --  BP(mean): --  RR: 33 (11 Jul 2024 11:00) (19 - 38)  SpO2: 97% (11 Jul 2024 11:00) (78% - 100%)    Parameters below as of 11 Jul 2024 08:00  Patient On (Oxygen Delivery Method): ventilator    O2 Concentration (%): 30  Daily     Daily     GENERAL: no acute distress  NEURO: intubated, sedated  HEENT: NCAT, no conjunctival pallor appreciated  CHEST: intubated, mechanical breath sounds  CARDIAC: regular rate, +S1/S2  ABDOMEN: soft, moderately distended, tender to palpation  EXTREMITIES: cold, poorly perfused  SKIN: no lesions noted    LABS: reviewed                        10.1 9.15  )-----------( 141      ( 11 Jul 2024 07:59 )             30.6     07-11    143  |  109<H>  |  96<H>  ----------------------------<  181<H>  4.0   |  12<L>  |  3.38<H>    Ca    8.0<L>      11 Jul 2024 07:58  Phos  7.1     07-11  Mg     2.0     07-11    TPro  5.2<L>  /  Alb  2.6<L>  /  TBili  0.7  /  DBili  x   /  AST  2532<H>  /  ALT  941<H>  /  AlkPhos  270<H>  07-11    LIVER FUNCTIONS - ( 11 Jul 2024 07:58 )  Alb: 2.6 g/dL / Pro: 5.2 g/dL / ALK PHOS: 270 U/L / ALT: 941 U/L / AST: 2532 U/L / GGT: x             Interval Diagnostic Studies: see sunrise for full report   Interval Events:   Patient intubated, sedated, on high dose levophed/vaso. No further melena reported. No BM since arrival to ICU.   Hgb dropped from 11.7 to 10.1 in past 7 hours. Started on cangrelor yesterday for concern for stent stenosis.    ROS:   12 point review of systems performed and negative except otherwise noted in HPI.    Hospital Medications:  acetaminophen     Tablet .. 650 milliGRAM(s) Oral every 6 hours PRN  allopurinol 100 milliGRAM(s) Oral two times a day  ascorbic acid 500 milliGRAM(s) Oral daily  atorvastatin 80 milliGRAM(s) Oral at bedtime  cangrelor Infusion 0.75 MICROgram(s)/kG/Min IV Continuous <Continuous>  chlorhexidine 0.12% Liquid 15 milliLiter(s) Oral Mucosa every 12 hours  chlorhexidine 2% Cloths 1 Application(s) Topical <User Schedule>  dexMEDEtomidine Infusion 0.5 MICROgram(s)/kG/Hr IV Continuous <Continuous>  fentaNYL    Injectable 50 MICROGram(s) IV Push every 4 hours PRN  folic acid 1 milliGRAM(s) Oral daily  lidocaine   4% Patch 1 Patch Transdermal daily  LORazepam   Injectable 2 milliGRAM(s) IV Push every 4 hours PRN  multivitamin 1 Tablet(s) Oral daily  norepinephrine Infusion 0.05 MICROgram(s)/kG/Min IV Continuous <Continuous>  ondansetron Injectable 4 milliGRAM(s) IV Push every 8 hours PRN  pantoprazole  Injectable 40 milliGRAM(s) IV Push two times a day  piperacillin/tazobactam IVPB.. 3.375 Gram(s) IV Intermittent every 12 hours  propofol Infusion 10 MICROgram(s)/kG/Min IV Continuous <Continuous>  sodium bicarbonate 1300 milliGRAM(s) Oral three times a day  sodium bicarbonate  Infusion 0.304 mEq/kG/Hr IV Continuous <Continuous>  thiamine 100 milliGRAM(s) Oral daily  vasopressin Infusion 0.04 Unit(s)/Min IV Continuous <Continuous>      PHYSICAL EXAM:   Vital Signs:  Vital Signs Last 24 Hrs  T(C): 35.9 (11 Jul 2024 08:00), Max: 39 (10 Jul 2024 23:00)  T(F): 96.6 (11 Jul 2024 08:00), Max: 102.2 (10 Jul 2024 23:00)  HR: 97 (11 Jul 2024 11:00) (68 - 149)  BP: --  BP(mean): --  RR: 33 (11 Jul 2024 11:00) (19 - 38)  SpO2: 97% (11 Jul 2024 11:00) (78% - 100%)    Parameters below as of 11 Jul 2024 08:00  Patient On (Oxygen Delivery Method): ventilator    O2 Concentration (%): 30  Daily     Daily     GENERAL: no acute distress  NEURO: intubated, sedated  HEENT: NCAT, no conjunctival pallor appreciated  CHEST: intubated, mechanical breath sounds  CARDIAC: regular rate, +S1/S2  ABDOMEN: soft, moderately distended, tender to palpation  EXTREMITIES: cold, poorly perfused  SKIN: no lesions noted    LABS: reviewed                        10.1   9.15  )-----------( 141      ( 11 Jul 2024 07:59 )             30.6     07-11    143  |  109<H>  |  96<H>  ----------------------------<  181<H>  4.0   |  12<L>  |  3.38<H>    Ca    8.0<L>      11 Jul 2024 07:58  Phos  7.1     07-11  Mg     2.0     07-11    TPro  5.2<L>  /  Alb  2.6<L>  /  TBili  0.7  /  DBili  x   /  AST  2532<H>  /  ALT  941<H>  /  AlkPhos  270<H>  07-11    LIVER FUNCTIONS - ( 11 Jul 2024 07:58 )  Alb: 2.6 g/dL / Pro: 5.2 g/dL / ALK PHOS: 270 U/L / ALT: 941 U/L / AST: 2532 U/L / GGT: x             Interval Diagnostic Studies: see sunrise for full report

## 2024-07-11 NOTE — CONSULT NOTE ADULT - ASSESSMENT
77 yo male with PMH of CAD s/p PCI x3 with most recent stent 6/12/24 on Plavix and eliquis , chronic Afib on eliquis, orthostatic hypotension on midodrine, PAD, neuroendocrine tumor with RT currently on hold, recent admission for dx cath on 6/24/24 who presents from PCP's office for hypotension despite taking AM midodrine dose on 7/2, admitted to medicine for symptomatic hypotension and RAZIA. Per chart, on 7/8 PM, pt was noted to have acute onset of melena x5 and coffee ground emesis x2-3. RRT was called on 7/9 AM for hypotension, hgb dropped from 9.2 to 7.4 (admission baseline 10-11), FOBT +, pt was started on PPI IV and transfused 1 unit of pRBC. His last eliquis was 6 pm 7/8 and plavix this AM. GI was consulted and pt is scheduled for EGD on 7/9 afternoon. MICU was consulted for uncontrolled bleeding during EGD. During EGD, pt became hypotensive and was given phenyephrine, had A-fib with RVR s/p amiodarone. Now s/p IR embolization, admitted to MICU for further moniring i.s.o hemorrhagic shock 2/2 GI bleed. Palliative consulted for assistance with goc.

## 2024-07-11 NOTE — PROGRESS NOTE ADULT - NS ATTEND AMEND GEN_ALL_CORE FT
MEDICATIONS  (STANDING):  allopurinol 100 milliGRAM(s) Oral two times a day  ascorbic acid 500 milliGRAM(s) Oral daily  atorvastatin 80 milliGRAM(s) Oral at bedtime  chlorhexidine 0.12% Liquid 15 milliLiter(s) Oral Mucosa every 12 hours  chlorhexidine 2% Cloths 1 Application(s) Topical <User Schedule>  clopidogrel Tablet 75 milliGRAM(s) Enteral Tube daily  dexMEDEtomidine Infusion 0.5 MICROgram(s)/kG/Hr (12.3 mL/Hr) IV Continuous <Continuous>  folic acid 1 milliGRAM(s) Oral daily  lidocaine   4% Patch 1 Patch Transdermal daily  multivitamin 1 Tablet(s) Oral daily  norepinephrine Infusion 0.05 MICROgram(s)/kG/Min (4.63 mL/Hr) IV Continuous <Continuous>  pantoprazole  Injectable 40 milliGRAM(s) IV Push two times a day  piperacillin/tazobactam IVPB.. 3.375 Gram(s) IV Intermittent every 12 hours  propofol Infusion 10 MICROgram(s)/kG/Min (5.92 mL/Hr) IV Continuous <Continuous>  sodium bicarbonate  Infusion 0.304 mEq/kG/Hr (200 mL/Hr) IV Continuous <Continuous>  thiamine 100 milliGRAM(s) Oral daily  vasopressin Infusion 0.04 Unit(s)/Min (6 mL/Hr) IV Continuous <Continuous>                            9.5    8.75  )-----------( 138      ( 11 Jul 2024 16:09 )             28.9       CBC Full  -  ( 11 Jul 2024 16:09 )  WBC Count : 8.75 K/uL  RBC Count : 3.19 M/uL  Hemoglobin : 9.5 g/dL  Hematocrit : 28.9 %  Platelet Count - Automated : 138 K/uL  Mean Cell Volume : 90.6 fl  Mean Cell Hemoglobin : 29.8 pg  Mean Cell Hemoglobin Concentration : 32.9 gm/dL  Auto Neutrophil # : 7.68 K/uL  Auto Lymphocyte # : 0.46 K/uL  Auto Monocyte # : 0.54 K/uL  Auto Eosinophil # : 0.01 K/uL  Auto Basophil # : 0.01 K/uL  Auto Neutrophil % : 87.7 %  Auto Lymphocyte % : 5.3 %  Auto Monocyte % : 6.2 %  Auto Eosinophil % : 0.1 %  Auto Basophil % : 0.1 %      07-11    141  |  105  |  99<H>  ----------------------------<  268<H>  4.1   |  14<L>  |  3.21<H>    Ca    7.7<L>      11 Jul 2024 16:09  Phos  7.0     07-11  Mg     1.8     07-11    TPro  5.0<L>  /  Alb  2.4<L>  /  TBili  0.6  /  DBili  x   /  AST  1742<H>  /  ALT  699<H>  /  AlkPhos  248<H>  07-11      CAPILLARY BLOOD GLUCOSE          Vital Signs Last 24 Hrs  T(C): 37.8 (11 Jul 2024 18:15), Max: 39 (10 Jul 2024 23:00)  T(F): 100 (11 Jul 2024 18:15), Max: 102.2 (10 Jul 2024 23:00)  HR: 119 (11 Jul 2024 19:00) (73 - 149)  BP: --  BP(mean): --  RR: 29 (11 Jul 2024 19:00) (19 - 38)  SpO2: 100% (11 Jul 2024 19:00) (93% - 100%)    Parameters below as of 11 Jul 2024 08:00  Patient On (Oxygen Delivery Method): ventilator    O2 Concentration (%): 30    Urinalysis Basic - ( 11 Jul 2024 16:09 )    Color: x / Appearance: x / SG: x / pH: x  Gluc: 268 mg/dL / Ketone: x  / Bili: x / Urobili: x   Blood: x / Protein: x / Nitrite: x   Leuk Esterase: x / RBC: x / WBC x   Sq Epi: x / Non Sq Epi: x / Bacteria: x        PT/INR - ( 10 Jul 2024 23:20 )   PT: 17.0 sec;   INR: 1.64 ratio         PTT - ( 10 Jul 2024 23:20 )  PTT:33.1 sec

## 2024-07-11 NOTE — PROGRESS NOTE ADULT - SUBJECTIVE AND OBJECTIVE BOX
[facility]   [phone]  RENAL FOLLOW UP NOTE  --------------------------------------------------------------------------------  Chief Complaint:    24 hour events/subjective:    patient seen and examined.   intubated    PAST HISTORY  --------------------------------------------------------------------------------  No significant changes to PMH, PSH, FHx, SHx, unless otherwise noted    ALLERGIES & MEDICATIONS  --------------------------------------------------------------------------------  Allergies    No Known Allergies    Intolerances      Standing Inpatient Medications  allopurinol 100 milliGRAM(s) Oral two times a day  ascorbic acid 500 milliGRAM(s) Oral daily  atorvastatin 80 milliGRAM(s) Oral at bedtime  cangrelor Infusion 0.75 MICROgram(s)/kG/Min IV Continuous <Continuous>  chlorhexidine 0.12% Liquid 15 milliLiter(s) Oral Mucosa every 12 hours  chlorhexidine 2% Cloths 1 Application(s) Topical <User Schedule>  dexMEDEtomidine Infusion 0.5 MICROgram(s)/kG/Hr IV Continuous <Continuous>  folic acid 1 milliGRAM(s) Oral daily  lidocaine   4% Patch 1 Patch Transdermal daily  multivitamin 1 Tablet(s) Oral daily  norepinephrine Infusion 0.05 MICROgram(s)/kG/Min IV Continuous <Continuous>  pantoprazole  Injectable 40 milliGRAM(s) IV Push two times a day  piperacillin/tazobactam IVPB.. 3.375 Gram(s) IV Intermittent every 12 hours  propofol Infusion 10 MICROgram(s)/kG/Min IV Continuous <Continuous>  sodium bicarbonate 1300 milliGRAM(s) Oral three times a day  sodium bicarbonate  Infusion 0.304 mEq/kG/Hr IV Continuous <Continuous>  thiamine 100 milliGRAM(s) Oral daily  vasopressin Infusion 0.04 Unit(s)/Min IV Continuous <Continuous>    PRN Inpatient Medications  acetaminophen     Tablet .. 650 milliGRAM(s) Oral every 6 hours PRN  fentaNYL    Injectable 50 MICROGram(s) IV Push every 4 hours PRN  LORazepam   Injectable 2 milliGRAM(s) IV Push every 4 hours PRN  ondansetron Injectable 4 milliGRAM(s) IV Push every 8 hours PRN      REVIEW OF SYSTEMS  --------------------------------------------------------------------------------    patient is unable to give ROS    VITALS/PHYSICAL EXAM  --------------------------------------------------------------------------------  T(C): 35.9 (07-11-24 @ 08:00), Max: 39 (07-10-24 @ 23:00)  HR: 92 (07-11-24 @ 10:00) (60 - 149)  BP: --  RR: 38 (07-11-24 @ 10:00) (19 - 38)  SpO2: 98% (07-11-24 @ 10:00) (78% - 100%)  Wt(kg): --  Height (cm): 188 (07-09-24 @ 13:27)  Weight (kg): 98.7 (07-09-24 @ 13:27)  BMI (kg/m2): 27.9 (07-09-24 @ 13:27)  BSA (m2): 2.25 (07-09-24 @ 13:27)      07-10-24 @ 07:01  -  07-11-24 @ 07:00  --------------------------------------------------------  IN: 5473.7 mL / OUT: 145 mL / NET: 5328.7 mL    07-11-24 @ 07:01  -  07-11-24 @ 10:31  --------------------------------------------------------  IN: 297.8 mL / OUT: 15 mL / NET: 282.8 mL      Physical Exam:  	  	Gen: intubated, sedated  	Pulm: decrease bs  no rales or ronchi or wheezing  	CV: No JVD. RRR, S1S2; no rub  	Abd: no BS, softly distended  	: daysi  	UE: Warm, no cyanosis  no clubbing,  no edema  	LE: Warm, no cyanosis  no clubbing, no edema    LABS/STUDIES  --------------------------------------------------------------------------------              10.1   9.15  >-----------<  141      [07-11-24 @ 07:59]              30.6     143  |  109  |  96  ----------------------------<  181      [07-11-24 @ 07:58]  4.0   |  12  |  3.38        Ca     8.0     [07-11-24 @ 07:58]      Mg     2.0     [07-11-24 @ 07:58]      Phos  7.1     [07-11-24 @ 07:58]    TPro  5.2  /  Alb  2.6  /  TBili  0.7  /  DBili  x   /  AST  2532  /  ALT  941  /  AlkPhos  270  [07-11-24 @ 07:58]    PT/INR: PT 17.0 , INR 1.64       [07-10-24 @ 23:20]  PTT: 33.1       [07-10-24 @ 23:20]    CK 2829      [07-11-24 @ 07:58]    Creatinine Trend:  SCr 3.38 [07-11 @ 07:58]  SCr 3.12 [07-10 @ 23:20]  SCr 2.89 [07-10 @ 17:59]  SCr 2.75 [07-10 @ 09:50]  SCr 2.50 [07-10 @ 00:16]            TSH 2.47      [07-04-24 @ 12:31]

## 2024-07-11 NOTE — CONSULT NOTE ADULT - PROBLEM SELECTOR RECOMMENDATION 6
Will continue to follow for ongoing GOC.  Case discussed with MICU resident.    For acute issues please page palliative team.    Linda Juarez MD  Geriatrics and Palliative Medicine Attending  [facility] pager: [phone]
will continue to follow for goc  case discussed with micu team and nephrology team  kamron discussed  Can be reached by TEAMS M-F 9-5 Marium Weiner Any other time please page [phone] if needed

## 2024-07-11 NOTE — CONSULT NOTE ADULT - PROBLEM SELECTOR RECOMMENDATION 5
see above GOC note  pt remains full code with no limitations to interventions  No HCP on file, [facility]
see GOC note above  surrogate is pt's wife Yamilet   pt remains full code  ongoing GOC based on clinical course

## 2024-07-11 NOTE — PROGRESS NOTE ADULT - ASSESSMENT
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, PAD, and orthostatic hypotension on midodrine presenting for lightheadedness He was admitted for initial complaint of lightheadedness and low bp despite midodrine, now with melena and coffee ground emesis and acute blood loss anemia.     Impression:  #Duodenal bleed s/p GDA embolization  #Acute on chronic blood loss anemia  #Renal failure with azotemia  #Neuroendocrine tumor with mets to liver  #Shock Liver    Developed acute onset of melena and coffee ground emesis on 7/9 with >2 point drop in Hgb in 7 hrs with accompanied azotemia. Found to have active bleed in second portion of duodenum, unable to identify source underneath large clot on EGD. Possible that source is from bleed from duodenal NE tumor vs duodenal ulcer. Now s/p empiric embolization of GDA by IR for bleeding control. Labs today notable for Hgb drop after starting cangrelor with worsening renal and liver failure.     Recommendations:  - Continue pantoprazole IV BID  - Transfuse for Hgb<8, trend cbc q6h, maintain active T&S, trend INR  - If any further melena or further drop in Hgb, please message GI team  - High risk for bleeding; risk-benefit decision per primary team and cardiology regarding antiplatelet agent and AC for stent thrombosis   - Keep patient NPO. Ok to place dobhoff if needed for medications  - Levophed/ vasopressin to maintain adequate perfusion   - Trend liver tests daily  - Palliative care involvement given guarded prognosis    All recommendations are tentative until note is attested by attending.     Bailey Choudhury, PGY4  Gastroenterology/Hepatology Fellow  Available on [employer] Teams  [phone] (Long Range Pager)  [phone] (Short Range Pager [facility])    After 5 pm, please contact the on-call GI fellow for any urgent issues via the Hospital Call     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, PAD, and orthostatic hypotension on midodrine presenting for lightheadedness He was admitted for initial complaint of lightheadedness and low bp despite midodrine, now with melena and coffee ground emesis and acute blood loss anemia.     Impression:  #Duodenal bleed s/p GDA embolization  #Acute on chronic blood loss anemia  #Renal failure with azotemia  #Neuroendocrine tumor with mets to liver  #Shock Liver    Developed acute onset of melena and coffee ground emesis on 7/9 with >2 point drop in Hgb in 7 hrs with accompanied azotemia. Found to have active bleed in second portion of duodenum, unable to identify source underneath large clot on EGD. Possible that source is from bleed from duodenal NE tumor vs duodenal ulcer. Now s/p empiric embolization of GDA by IR for bleeding control. Labs today notable for Hgb drop after starting cangrelor with worsening renal and liver failure. Abdomen increasingly distended with no BM for several days.    Recommendations:  - Please obtain abdominal xray to r/o perforation/obstruction  - Continue pantoprazole IV BID  - Transfuse for Hgb<8, trend cbc q6h, maintain active T&S, trend INR  - If any further melena or further drop in Hgb, please message GI team  - High risk for bleeding; risk-benefit decision per primary team and cardiology regarding antiplatelet agent and AC for stent thrombosis   - Keep patient NPO. Ok to place dobhoff if needed for medications  - Levophed/ vasopressin to maintain adequate perfusion   - Trend liver tests daily  - Palliative care involvement given guarded prognosis    All recommendations are tentative until note is attested by attending.     Bailey Choudhury, PGY4  Gastroenterology/Hepatology Fellow  Available on [employer] Teams  [phone] (Long Range Pager)  [phone] (Short Range Pager [facility])    After 5 pm, please contact the on-call GI fellow for any urgent issues via the Hospital Call

## 2024-07-11 NOTE — PROGRESS NOTE ADULT - ATTENDING COMMENTS
Agree with above. Abdominal x-ray shows no obstruction. H/H downtrending but no melena or signs of active bleeding, dark output from NGT likely old blood. Would continue IV PPI and monitor H/H. D/w family at bedside.

## 2024-07-11 NOTE — CONSULT NOTE ADULT - TIME BILLING
Symptom assessment and management, supportive counseling, coordination of care
Total Time Spent 75 minutes.    This includes chart review, patient assessment, discussion and collaboration with interdisciplinary team members, excluding ACP.    COUNSELING:  Face to face meeting to discuss Advanced Care Planning- Time Spent 16 minutes

## 2024-07-11 NOTE — CONSULT NOTE ADULT - CONVERSATION DETAILS
Met with pt's family at bedside. IDT introduced ourselves and roles. Medical updates provided by ICU team, reviewed GIB, need for airway protection, and pressor support. Renal team reviewed worsening renal function, the need for bicarb gtt, and the overall concern of kidney function. All medical questions answered by team. Reviewed that pt is critically ill. Discussed if advanced directives/ code status had ever been discussed. Yamilet shared they had not and inquired into next steps. Discussed goal of medical extubation, and explained and reviewed the decisions of reintubation and CPR. Discussed if pt is unable to be medically extubated the need to decided on compassionate withdrawal of care vs long term tracheostomy. As per family, they will discuss amongst themselves and come to a decision in the coming days regarding next steps. Emotional support provided.
Patient unable to participate in GOC discussion due to sedation. Attempted to call patient's wife Yamilet without answer. Met with patient's daughter Pia at bedside. ACP reviewed. Pia shared that patient has not completed a HCP form and that patient's wife and three children make decisions together. She shared that patient does not have a living will or MOLST form that she is aware of. She confirmed that patient would want to remain full code at this time, expressing that he is a fighter and would want CPR to be attempted should he need it. We discussed that in patient's current condition, CPR would potentially cause more suffering than benefit. She understands and stated that she will discuss with her family when they arrive. All question answered and support provided.
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
## 2024-07-12 NOTE — PROGRESS NOTE ADULT - PROBLEM SELECTOR PLAN 6
will continue to follow for goc  case discussed with primary team  Can be reached by TEAMS M-F 9-5 Marium Weiner Any other time please page [phone] if needed

## 2024-07-12 NOTE — PROGRESS NOTE ADULT - SUBJECTIVE AND OBJECTIVE BOX
[facility]   [phone]  RENAL FOLLOW UP NOTE  --------------------------------------------------------------------------------  Chief Complaint:    24 hour events/subjective:    patient seen and examined.   intubated    PAST HISTORY  --------------------------------------------------------------------------------  No significant changes to PMH, PSH, FHx, SHx, unless otherwise noted    ALLERGIES & MEDICATIONS  --------------------------------------------------------------------------------  Allergies    No Known Allergies    Intolerances      Standing Inpatient Medications  allopurinol 100 milliGRAM(s) Oral two times a day  ascorbic acid 500 milliGRAM(s) Oral daily  atorvastatin 80 milliGRAM(s) Oral at bedtime  chlorhexidine 0.12% Liquid 15 milliLiter(s) Oral Mucosa every 12 hours  chlorhexidine 2% Cloths 1 Application(s) Topical <User Schedule>  dexMEDEtomidine Infusion 0.5 MICROgram(s)/kG/Hr IV Continuous <Continuous>  folic acid 1 milliGRAM(s) Oral daily  lidocaine   4% Patch 1 Patch Transdermal daily  multivitamin 1 Tablet(s) Oral daily  norepinephrine Infusion 0.05 MICROgram(s)/kG/Min IV Continuous <Continuous>  pantoprazole Infusion 8 mG/Hr IV Continuous <Continuous>  phenylephrine    Infusion 0.25 MICROgram(s)/kG/Min IV Continuous <Continuous>  piperacillin/tazobactam IVPB.. 3.375 Gram(s) IV Intermittent every 12 hours  potassium chloride  10 mEq/100 mL IVPB 10 milliEquivalent(s) IV Intermittent every 1 hour  propofol Infusion 10 MICROgram(s)/kG/Min IV Continuous <Continuous>  sodium bicarbonate  Infusion 0.152 mEq/kG/Hr IV Continuous <Continuous>  thiamine 100 milliGRAM(s) Oral daily  vasopressin Infusion 0.04 Unit(s)/Min IV Continuous <Continuous>    PRN Inpatient Medications  fentaNYL    Injectable 50 MICROGram(s) IV Push every 4 hours PRN  LORazepam   Injectable 2 milliGRAM(s) IV Push every 4 hours PRN  ondansetron Injectable 4 milliGRAM(s) IV Push every 8 hours PRN      REVIEW OF SYSTEMS  --------------------------------------------------------------------------------    patient is unable to give ROS    VITALS/PHYSICAL EXAM  --------------------------------------------------------------------------------  T(C): 37.3 (07-12-24 @ 12:00), Max: 38.3 (07-11-24 @ 20:00)  HR: 100 (07-12-24 @ 13:15) (75 - 165)  BP: --  RR: 22 (07-12-24 @ 13:15) (20 - 34)  SpO2: 100% (07-12-24 @ 13:15) (95% - 100%)  Wt(kg): --        07-11-24 @ 07:01  -  07-12-24 @ 07:00  --------------------------------------------------------  IN: 8414.9 mL / OUT: 1305 mL / NET: 7109.9 mL    07-12-24 @ 07:01  -  07-12-24 @ 14:10  --------------------------------------------------------  IN: 2631.5 mL / OUT: 1200 mL / NET: 1431.5 mL      Physical Exam:  	  	Gen: intubated, sedated  	Pulm: decrease bs  no rales or ronchi or wheezing  	CV: No JVD. RRR, S1S2; no rub  	Abd: no BS, softly distended  	: daysi  	UE: Warm, no cyanosis  no clubbing,  no edema  	LE: Warm, no cyanosis  no clubbing, no edema    LABS/STUDIES  --------------------------------------------------------------------------------              11.8   8.77  >-----------<  121      [07-12-24 @ 12:57]              34.3     140  |  104  |  96  ----------------------------<  273      [07-12-24 @ 09:11]  3.2   |  17  |  2.20        Ca     7.4     [07-12-24 @ 09:11]      Mg     2.0     [07-12-24 @ 09:11]      Phos  4.0     [07-12-24 @ 09:11]    TPro  4.9  /  Alb  2.5  /  TBili  1.1  /  DBili  x   /  AST  687  /  ALT  345  /  AlkPhos  213  [07-12-24 @ 09:11]    PT/INR: PT 14.0 , INR 1.34       [07-12-24 @ 12:57]  PTT: 32.2       [07-12-24 @ 12:57]    CK 1668      [07-12-24 @ 09:11]    Creatinine Trend:  SCr 2.20 [07-12 @ 09:11]  SCr 2.87 [07-12 @ 00:38]  SCr 3.21 [07-11 @ 16:09]  SCr 3.38 [07-11 @ 07:58]  SCr 3.12 [07-10 @ 23:20]              TSH 2.47      [07-04-24 @ 12:31]

## 2024-07-12 NOTE — PROGRESS NOTE ADULT - ASSESSMENT
Assessment: 79 yo male with PMH of CAD s/p PCI x3 with most recent stent 6/12/24 on Plavix and eliquis , chronic Afib on eliquis, orthostatic hypotension on midodrine, PAD, neuroendocrine tumor with RT currently on hold, recent admission for dx cath on 6/24/24 who presents from PCP's office for hypotension despite taking AM midodrine dose on 7/2, admitted to medicine for symptomatic hypotension and RAZIA. Per chart, on 7/8 PM, pt was noted to have acute onset of melena x5 and coffee ground emesis x2-3. RRT was called on 7/9 AM for hypotension, hgb dropped from 9.2 to 7.4 (admission baseline 10-11), FOBT +, pt was started on PPI IV and transfused 1 unit of pRBC. His last eliquis was 6 pm 7/8 and plavix this AM. GI was consulted and pt is scheduled for EGD on 7/9 afternoon. MICU was consulted for uncontrolled bleeding during EGD. During EGD, pt became hypotensive and was given phenyephrine, had A-fib with RVR s/p amiodarone. Now s/p IR embolization, admitted to MICU for further moniring i.s.o hemorrhagic shock 2/2 GI bleed     Plan:     Neuro   # Baseline AOx3  - intubated   - on precedex   - ativan prn for agitation   - fentanyl prn for pain control     CVS  # hemorrhagic shock   - RRT called 7/9 for hypotension   -  2/2 to Upper GI bleeds, urgently took to EGD, found bleeding ulcer that was not well controlled, underwent IR empiric embolization 7/9  - d/c midodrine   - on vasopressin and levo, leans as tolerated, maintain MAP > 65  - AM cortisol 17 (7/5), AM cortisol 37.1 (7/11)    #CAD s/p PCI x3, most recent stent 6/12/24, NURIA to pLAD   - dc cangrelor infusion  - start Plavix after KO tube placement   - c/w statin   - EKG   - trend cardiac enzymes     #PAD  # A-fib on eliquis    - s/p successful DCCV 10/31   - hold sotalol given hypotension 2/2 GI bleeds   - hold eliquis     PULM   #Intubated for airway protection prior to EGD/IR embolization  - will hold off on SBT if any further procedures are planned   -AC 16/450/5/40    Renal/  #RAZIA   #ATN i.s.o hypotension  #metabolic acidosis   - f/u nephro   - trend BUN/Cr   - monitor Is and Os   - start D5 with sodium bicarbonate     GI   #Upper GI Bleed   - s/p 5 units of RBC   - CT A/P 7/9 - Active bleeding in the third portion of the duodenum. Progression of liver metastases.  - EGD 7/9 showed esophagitis, One non-bleeding duodenal ulcer with a clean ulcer base (Arjun Class III). One oozing duodenal ulcer with spurting hemorrhage (Arjun Class Ia). Treatment not successful. Treated with bipolar cautery.  - s/p IR embolization on 7/9  - per GI recs, change PPI gtt to PPI IV BID  - Hgb goal >7, transfuse if below   - per GI, plavix can be started through KO tube  - hold eliquis   - per GI - no plan for another scope unless rebleeds  - per IR - no plan for procedure as of now   - continue to f/u with GI and IR     #Diarrhea   - c/w maintenaince fluids     ID  #leukocytosis   - f/u BCx, UCx, Sputum Cx   - Start zosyn (7/11 - )   - GI PCR indeterminated for norovirus   - per ID- no intervention needed regarding the PCR results    ENDOCRINE  #adrenal insufficiency   -7/5 cortisol 17   - 7/11 cortisol 37.1     HEMATOLOGY/ONCOLOGY   - neuroendocrine tumor   - was on lanreotide then had POD in liver and started on peptide receptor radiotherapy (PRRT)- typically given every 8 weeks for 4 doses. He last received it 10/20/2023   - PET 5/28/24 shows new somatostatin avid osseous lesions; decreased intensely avid right supradiaphragmatic and upper abdominal nodes, suspicious for mets    SKINS:   - Lines/Tubes - PIV, ETT, cordis    Ethics:   - Full Code  Assessment: 79 yo male with PMH of CAD s/p PCI x3 with most recent stent 6/12/24 on Plavix and eliquis , chronic Afib on eliquis, orthostatic hypotension on midodrine, PAD, neuroendocrine tumor with RT currently on hold, recent admission for dx cath on 6/24/24 who presents from PCP's office for hypotension despite taking AM midodrine dose on 7/2, admitted to medicine for symptomatic hypotension and RAZIA. Per chart, on 7/8 PM, pt was noted to have acute onset of melena x5 and coffee ground emesis x2-3. RRT was called on 7/9 AM for hypotension, hgb dropped from 9.2 to 7.4 (admission baseline 10-11), FOBT +, pt was started on PPI IV and transfused 1 unit of pRBC. His last eliquis was 6 pm 7/8 and plavix this AM. GI was consulted and pt is scheduled for EGD on 7/9 afternoon. MICU was consulted for uncontrolled bleeding during EGD. During EGD, pt became hypotensive and was given phenyephrine, had A-fib with RVR s/p amiodarone. Now s/p IR embolization, admitted to MICU for further moniring i.s.o hemorrhagic shock 2/2 GI bleed     Plan:     Neuro   # Baseline AOx3  - intubated   - on precedex   - ativan prn for agitation   - fentanyl prn for pain control     CVS  # hemorrhagic shock   - RRT called 7/9 for hypotension   -  2/2 to Upper GI bleeds, urgently took to EGD, found bleeding ulcer that was not well controlled, underwent IR empiric embolization 7/9  - d/c midodrine   - on vasopressin and levo, leans as tolerated, maintain MAP > 65  - AM cortisol 17 (7/5), AM cortisol 37.1 (7/11)  - Hgb dropped overnight, s/p 3 pRBC, 1 platelet and 1 FFP overnight, received 1pRBC, 1 platelet, 1 FFP in the AM   - transfuse as needed   - f/u GI regarding bedside scope given concern for GI rebleeds  - repeat coagulation labs     #CAD s/p PCI x3, most recent stent 6/12/24, NURIA to pLAD   - dc cangrelor infusion  - d/c plavix given GI rebleeds   - c/w statin   - EKG     #PAD  # A-fib on eliquis    - s/p successful DCCV 10/31   - hold sotalol given hypotension 2/2 GI bleeds   - hold eliquis     PULM   #Intubated for airway protection prior to EGD/IR embolization  - will hold off on SBT if any further procedures are planned   -AC 16/450/5/30    Renal/  #RAZIA   #ATN i.s.o hypotension  #metabolic acidosis   - f/u nephro   - trend BUN/Cr   - monitor Is and Os   - start D5 with sodium bicarbonate     GI   #Upper GI Bleed   - s/p 5 units pRBC prior to MICU   - CT A/P 7/9 - Active bleeding in the third portion of the duodenum. Progression of liver metastases.  - EGD 7/9 showed esophagitis, One non-bleeding duodenal ulcer with a clean ulcer base (Arjun Class III). One oozing duodenal ulcer with spurting hemorrhage (Arjun Class Ia). Treatment not successful. Treated with bipolar cautery.  - s/p IR embolization on 7/9  - per GI recs, change PPI gtt to PPI IV BID  - monitor Hgb, transfuse as needed   - hold eliquis   - per IR - no plan for procedure as of now   - follow up with GI with bedside scope given concern for GI bleeds  - continue to f/u with GI and IR     #Diarrhea   - c/w maintenaince fluids     ID  #leukocytosis   - f/u BCx, UCx, Sputum Cx   - Start zosyn (7/11 - )   - GI PCR indeterminated for norovirus   - per ID- no intervention needed regarding the PCR results    ENDOCRINE  #adrenal insufficiency   -7/5 cortisol 17   - 7/11 cortisol 37.1     HEMATOLOGY/ONCOLOGY   - neuroendocrine tumor   - was on lanreotide then had POD in liver and started on peptide receptor radiotherapy (PRRT)- typically given every 8 weeks for 4 doses. He last received it 10/20/2023   - PET 5/28/24 shows new somatostatin avid osseous lesions; decreased intensely avid right supradiaphragmatic and upper abdominal nodes, suspicious for mets  - per heme/onc, can resume octreotide once infection r/o     SKINS:   - Lines/Tubes - PIV, ETT, cordis    Ethics:   - Full Code   - [facility] 7/12, spoke to spouse over phone with palliative care team

## 2024-07-12 NOTE — PROGRESS NOTE ADULT - SUBJECTIVE AND OBJECTIVE BOX
INTERVAL HPI/OVERNIGHT EVENTS:    SUBJECTIVE: Patient seen and examined at bedside.     ROS: All negative except as listed above.    VITAL SIGNS:  ICU Vital Signs Last 24 Hrs  T(C): 37.6 (12 Jul 2024 04:00), Max: 38.3 (11 Jul 2024 20:00)  T(F): 99.7 (12 Jul 2024 04:00), Max: 100.9 (11 Jul 2024 20:00)  HR: 144 (12 Jul 2024 06:45) (77 - 165)  BP: --  BP(mean): --  ABP: 130/53 (12 Jul 2024 06:45) (64/36 - 174/68)  ABP(mean): 79 (12 Jul 2024 06:45) (40 - 106)  RR: 26 (12 Jul 2024 06:45) (25 - 38)  SpO2: 97% (12 Jul 2024 06:45) (93% - 100%)    O2 Parameters below as of 11 Jul 2024 20:00  Patient On (Oxygen Delivery Method): ventilator    O2 Concentration (%): 30      Mode: AC/ CMV (Assist Control/ Continuous Mandatory Ventilation), RR (machine): 16, TV (machine): 450, FiO2: 30, PEEP: 5, ITime: 1, MAP: 9, PIP: 18  Plateau pressure:   P/F ratio:     07-11 @ 07:01  -  07-12 @ 07:00  --------------------------------------------------------  IN: 8059.3 mL / OUT: 1305 mL / NET: 6754.3 mL      CAPILLARY BLOOD GLUCOSE          ECG: reviewed.    PHYSICAL EXAM:    GENERAL: NAD, lying in bed comfortably  HEAD:  Atraumatic, normocephalic  EYES: EOMI, PERRLA, conjunctiva and sclera clear  NECK: Supple, trachea midline, no JVD  HEART: Regular rate and rhythm, no murmurs, rubs, or gallops  LUNGS: Unlabored respirations.  Clear to auscultation bilaterally, no crackles, wheezing, or rhonchi  ABDOMEN: Soft, nontender, nondistended, +BS  EXTREMITIES: 2+ peripheral pulses bilaterally, cap refill<2 secs. No clubbing, cyanosis, or edema  NERVOUS SYSTEM:  A&Ox3, following commands, moving all extremities, no focal deficits   SKIN: No rashes or lesions    MEDICATIONS:  MEDICATIONS  (STANDING):  allopurinol 100 milliGRAM(s) Oral two times a day  ascorbic acid 500 milliGRAM(s) Oral daily  atorvastatin 80 milliGRAM(s) Oral at bedtime  chlorhexidine 0.12% Liquid 15 milliLiter(s) Oral Mucosa every 12 hours  chlorhexidine 2% Cloths 1 Application(s) Topical <User Schedule>  dexMEDEtomidine Infusion 0.5 MICROgram(s)/kG/Hr (12.3 mL/Hr) IV Continuous <Continuous>  folic acid 1 milliGRAM(s) Oral daily  lidocaine   4% Patch 1 Patch Transdermal daily  multivitamin 1 Tablet(s) Oral daily  norepinephrine Infusion 0.05 MICROgram(s)/kG/Min (4.63 mL/Hr) IV Continuous <Continuous>  pantoprazole  Injectable 40 milliGRAM(s) IV Push two times a day  phenylephrine    Infusion 0.25 MICROgram(s)/kG/Min (9.25 mL/Hr) IV Continuous <Continuous>  piperacillin/tazobactam IVPB.. 3.375 Gram(s) IV Intermittent every 12 hours  potassium chloride   Solution 40 milliEquivalent(s) Oral every 4 hours  propofol Infusion 10 MICROgram(s)/kG/Min (5.92 mL/Hr) IV Continuous <Continuous>  sodium bicarbonate  Infusion 0.304 mEq/kG/Hr (200 mL/Hr) IV Continuous <Continuous>  thiamine 100 milliGRAM(s) Oral daily  vasopressin Infusion 0.04 Unit(s)/Min (6 mL/Hr) IV Continuous <Continuous>    MEDICATIONS  (PRN):  fentaNYL    Injectable 50 MICROGram(s) IV Push every 4 hours PRN Severe Pain (7 - 10)  LORazepam   Injectable 2 milliGRAM(s) IV Push every 4 hours PRN Agitation  ondansetron Injectable 4 milliGRAM(s) IV Push every 8 hours PRN Nausea and/or Vomiting      ALLERGIES:  Allergies    No Known Allergies    Intolerances        LABS:                        9.2    12.08 )-----------( 127      ( 12 Jul 2024 06:30 )             28.0     07-12    142  |  107  |  96<H>  ----------------------------<  267<H>  3.2<L>   |  16<L>  |  2.87<H>    Ca    6.9<L>      12 Jul 2024 00:38  Phos  5.0     07-12  Mg     1.7     07-12    TPro  4.6<L>  /  Alb  2.1<L>  /  TBili  0.6  /  DBili  x   /  AST  1236<H>  /  ALT  544<H>  /  AlkPhos  241<H>  07-12    PT/INR - ( 12 Jul 2024 00:38 )   PT: 22.8 sec;   INR: 2.12 ratio         PTT - ( 12 Jul 2024 00:38 )  PTT:33.3 sec  Urinalysis Basic - ( 12 Jul 2024 00:38 )    Color: x / Appearance: x / SG: x / pH: x  Gluc: 267 mg/dL / Ketone: x  / Bili: x / Urobili: x   Blood: x / Protein: x / Nitrite: x   Leuk Esterase: x / RBC: x / WBC x   Sq Epi: x / Non Sq Epi: x / Bacteria: x      ABG:  pH, Arterial: 7.39 (07-12-24 @ 07:24)  pCO2, Arterial: 32 mmHg (07-12-24 @ 07:24)  pO2, Arterial: 104 mmHg (07-12-24 @ 07:24)  pH, Arterial: 7.40 (07-12-24 @ 05:19)  pCO2, Arterial: 31 mmHg (07-12-24 @ 05:19)  pO2, Arterial: 112 mmHg (07-12-24 @ 05:19)  pH, Arterial: 7.36 (07-12-24 @ 00:26)  pCO2, Arterial: 31 mmHg (07-12-24 @ 00:26)  pO2, Arterial: 128 mmHg (07-12-24 @ 00:26)  pH, Arterial: 7.31 (07-11-24 @ 15:58)  pCO2, Arterial: 32 mmHg (07-11-24 @ 15:58)  pO2, Arterial: 137 mmHg (07-11-24 @ 15:58)      vBG:    Micro:    Culture - Blood (collected 07-10-24 @ 20:35)  Source: .Blood Blood-Peripheral  Preliminary Report (07-12-24 @ 03:02):    No growth at 24 hours    Culture - Blood (collected 07-10-24 @ 20:30)  Source: .Blood Blood-Peripheral  Preliminary Report (07-12-24 @ 03:02):    No growth at 24 hours          RADIOLOGY & ADDITIONAL TESTS: Reviewed.   INTERVAL HPI/OVERNIGHT EVENTS: Febrile ON. Pt started having several episodes of melena since 2 AM and became hypotensive, requiring increase in pressor supports. Initially, pt has A-fib with RVR and received amiodarone x1. Received a total of 3 units pf pRBC, 1 platelet and 1 FFP overnight.     SUBJECTIVE: Patient seen and examined at bedside.     ROS: Unable to assess given mental status     VITAL SIGNS:  ICU Vital Signs Last 24 Hrs  T(C): 37.6 (12 Jul 2024 04:00), Max: 38.3 (11 Jul 2024 20:00)  T(F): 99.7 (12 Jul 2024 04:00), Max: 100.9 (11 Jul 2024 20:00)  HR: 144 (12 Jul 2024 06:45) (77 - 165)  BP: --  BP(mean): --  ABP: 130/53 (12 Jul 2024 06:45) (64/36 - 174/68)  ABP(mean): 79 (12 Jul 2024 06:45) (40 - 106)  RR: 26 (12 Jul 2024 06:45) (25 - 38)  SpO2: 97% (12 Jul 2024 06:45) (93% - 100%)    O2 Parameters below as of 11 Jul 2024 20:00  Patient On (Oxygen Delivery Method): ventilator    O2 Concentration (%): 30      Mode: AC/ CMV (Assist Control/ Continuous Mandatory Ventilation), RR (machine): 16, TV (machine): 450, FiO2: 30, PEEP: 5, ITime: 1, MAP: 9, PIP: 18  Plateau pressure:   P/F ratio:     07-11 @ 07:01  -  07-12 @ 07:00  --------------------------------------------------------  IN: 8059.3 mL / OUT: 1305 mL / NET: 6754.3 mL      CAPILLARY BLOOD GLUCOSE          ECG: reviewed.    PHYSICAL EXAM:    GENERAL: NAD, lying in bed comfortably  HEAD:  Atraumatic, normocephalic  EYES: Eyes closed   NECK: Supple, trachea midline, no JVD  HEART: Regular rate and rhythm, no murmurs, rubs, or gallops  LUNGS: Unlabored respirations.    ABDOMEN: Soft, nontender  EXTREMITIES: No clubbing, cyanosis, or edema  NERVOUS SYSTEM:  Sedated  SKIN: No rashes or lesions    MEDICATIONS:  MEDICATIONS  (STANDING):  allopurinol 100 milliGRAM(s) Oral two times a day  ascorbic acid 500 milliGRAM(s) Oral daily  atorvastatin 80 milliGRAM(s) Oral at bedtime  chlorhexidine 0.12% Liquid 15 milliLiter(s) Oral Mucosa every 12 hours  chlorhexidine 2% Cloths 1 Application(s) Topical <User Schedule>  dexMEDEtomidine Infusion 0.5 MICROgram(s)/kG/Hr (12.3 mL/Hr) IV Continuous <Continuous>  folic acid 1 milliGRAM(s) Oral daily  lidocaine   4% Patch 1 Patch Transdermal daily  multivitamin 1 Tablet(s) Oral daily  norepinephrine Infusion 0.05 MICROgram(s)/kG/Min (4.63 mL/Hr) IV Continuous <Continuous>  pantoprazole  Injectable 40 milliGRAM(s) IV Push two times a day  phenylephrine    Infusion 0.25 MICROgram(s)/kG/Min (9.25 mL/Hr) IV Continuous <Continuous>  piperacillin/tazobactam IVPB.. 3.375 Gram(s) IV Intermittent every 12 hours  potassium chloride   Solution 40 milliEquivalent(s) Oral every 4 hours  propofol Infusion 10 MICROgram(s)/kG/Min (5.92 mL/Hr) IV Continuous <Continuous>  sodium bicarbonate  Infusion 0.304 mEq/kG/Hr (200 mL/Hr) IV Continuous <Continuous>  thiamine 100 milliGRAM(s) Oral daily  vasopressin Infusion 0.04 Unit(s)/Min (6 mL/Hr) IV Continuous <Continuous>    MEDICATIONS  (PRN):  fentaNYL    Injectable 50 MICROGram(s) IV Push every 4 hours PRN Severe Pain (7 - 10)  LORazepam   Injectable 2 milliGRAM(s) IV Push every 4 hours PRN Agitation  ondansetron Injectable 4 milliGRAM(s) IV Push every 8 hours PRN Nausea and/or Vomiting      ALLERGIES:  Allergies    No Known Allergies    Intolerances        LABS:                        9.2    12.08 )-----------( 127      ( 12 Jul 2024 06:30 )             28.0     07-12    142  |  107  |  96<H>  ----------------------------<  267<H>  3.2<L>   |  16<L>  |  2.87<H>    Ca    6.9<L>      12 Jul 2024 00:38  Phos  5.0     07-12  Mg     1.7     07-12    TPro  4.6<L>  /  Alb  2.1<L>  /  TBili  0.6  /  DBili  x   /  AST  1236<H>  /  ALT  544<H>  /  AlkPhos  241<H>  07-12    PT/INR - ( 12 Jul 2024 00:38 )   PT: 22.8 sec;   INR: 2.12 ratio         PTT - ( 12 Jul 2024 00:38 )  PTT:33.3 sec  Urinalysis Basic - ( 12 Jul 2024 00:38 )    Color: x / Appearance: x / SG: x / pH: x  Gluc: 267 mg/dL / Ketone: x  / Bili: x / Urobili: x   Blood: x / Protein: x / Nitrite: x   Leuk Esterase: x / RBC: x / WBC x   Sq Epi: x / Non Sq Epi: x / Bacteria: x      ABG:  pH, Arterial: 7.39 (07-12-24 @ 07:24)  pCO2, Arterial: 32 mmHg (07-12-24 @ 07:24)  pO2, Arterial: 104 mmHg (07-12-24 @ 07:24)  pH, Arterial: 7.40 (07-12-24 @ 05:19)  pCO2, Arterial: 31 mmHg (07-12-24 @ 05:19)  pO2, Arterial: 112 mmHg (07-12-24 @ 05:19)  pH, Arterial: 7.36 (07-12-24 @ 00:26)  pCO2, Arterial: 31 mmHg (07-12-24 @ 00:26)  pO2, Arterial: 128 mmHg (07-12-24 @ 00:26)  pH, Arterial: 7.31 (07-11-24 @ 15:58)  pCO2, Arterial: 32 mmHg (07-11-24 @ 15:58)  pO2, Arterial: 137 mmHg (07-11-24 @ 15:58)      vBG:    Micro:    Culture - Blood (collected 07-10-24 @ 20:35)  Source: .Blood Blood-Peripheral  Preliminary Report (07-12-24 @ 03:02):    No growth at 24 hours    Culture - Blood (collected 07-10-24 @ 20:30)  Source: .Blood Blood-Peripheral  Preliminary Report (07-12-24 @ 03:02):    No growth at 24 hours          RADIOLOGY & ADDITIONAL TESTS: Reviewed.

## 2024-07-12 NOTE — PROGRESS NOTE ADULT - ASSESSMENT
79 yo male with PMH of CAD s/p PCI x3 with most recent stent 6/12/24 on Plavix and eliquis , chronic Afib on eliquis, orthostatic hypotension on midodrine, PAD, neuroendocrine tumor with RT currently on hold, recent admission for dx cath on 6/24/24 who presents from PCP's office for hypotension despite taking AM midodrine dose on 7/2, admitted to medicine for symptomatic hypotension and RAZIA. Per chart, on 7/8 PM, pt was noted to have acute onset of melena x5 and coffee ground emesis x2-3. RRT was called on 7/9 AM for hypotension, hgb dropped from 9.2 to 7.4 (admission baseline 10-11), FOBT +, pt was started on PPI IV and transfused 1 unit of pRBC. His last eliquis was 6 pm 7/8 and plavix this AM. GI was consulted and pt is scheduled for EGD on 7/9 afternoon. MICU was consulted for uncontrolled bleeding during EGD. During EGD, pt became hypotensive and was given phenyephrine, had A-fib with RVR s/p amiodarone. Now s/p IR embolization, admitted to MICU for further moniring i.s.o hemorrhagic shock 2/2 GI bleed. Palliative consulted for assistance with goc.

## 2024-07-12 NOTE — PROGRESS NOTE ADULT - SUBJECTIVE AND OBJECTIVE BOX
Interventional Radiology Follow-Up Note    This is a 78 year old Male s/p Empiric GDA Embolization on 7/9 in Interventional Radiology with Dr. Zuleta.      Patient seen and examined @ bedside. Unable to obtain appropriate HPI as patient is sedated and intubated.    Medication:  aMIOdarone IVPB: (07-12)  cangrelor Infusion: (07-10)  clopidogrel Tablet: (07-11)  phenylephrine    Infusion: (07-12)  piperacillin/tazobactam IVPB.: (07-11)  piperacillin/tazobactam IVPB.-: (07-11)  piperacillin/tazobactam IVPB..: (07-12)    Vitals:   T(F): 99.1, Max: 100.9 (20:00)  HR: 103  BP: --  RR: 22  SpO2: 100%    Physical Exam:  General: sedated   Respiratory: intubated  Extremities: B/L lower extremities cool to touch, Right groin soft with no evidence of hematoma, +R femoral pulse, doppler + b/l pedal pulses, b/l pedal edema.       LABS:  WBC -- / Hgb -- / Hct -- / Plt --  Na -- / K -- / CO2 -- / Cl -- / BUN -- / Cr -- / Glucose --  ALT -- / AST -- / Alk Phos -- / Tbili --  Ptt 32.9 / Pt 16.3 / INR 1.57    Complete Blood Count + Automated Diff (07.12.24 @ 09:11)   WBC Count: 10.41 K/uL  RBC Count: 3.85 M/uL  Hemoglobin: 11.5 g/dL  Hematocrit: 33.2 %  Mean Cell Volume: 86.2 fl  Mean Cell Hemoglobin: 29.9 pg  Mean Cell Hemoglobin Conc: 34.6 gm/dL  Red Cell Distrib Width: 15.7 %  Platelet Count - Automated: 111 K/uL  Auto Neutrophil #: 9.38 K/uL  Auto Lymphocyte #: 0.40 K/uL  Auto Monocyte #: 0.47 K/uL  Auto Eosinophil #: 0.04 K/uL  Auto Basophil #: 0.03 K/uL  Auto Neutrophil %: 90.1: Manual Differential performed within last 36 hours. 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 He was sent in from outpatient office visit for complaint of lightheadedness and low bp to systolics in 80s despite midodrine. Patient with melena and abdominal pain. 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 RRT today. Patient taken to endoscopy with inability to control bleeding. No clip placed. Of note, patient has history of recent stent 6/12/24 requiring plavix and eliquis with last eliquis dose at 6 pm on 7/8 and plavix dose this morning. Patient now s/p empiric GDA embolization on 7/9 in IR with Dr. Zuleta.     - continue global management per primary team  - HGB overnight notably dropped 11.7--> 10.1-->9.5-->9.0--->8.9-->8.0, now s/p 4uPRBC, 1uPLT, and 1uPlasma with appropriate response HGB up to 11.5 on most recent CBC  - monitor h/h; transfuse as needed; continue to trend H/H  - GI planning to scope today  - IR available if intervention needed, IR to continue to follow  - trend vs/labs  - Pt on Levo/Vaso, wean pressors as tolerated       Please call IR at extension [phone] with any questions, concerns, or issues regarding above.

## 2024-07-12 NOTE — PROGRESS NOTE ADULT - NS ATTEND AMEND GEN_ALL_CORE FT
MEDICATIONS  (STANDING):  allopurinol 100 milliGRAM(s) Oral two times a day  ascorbic acid 500 milliGRAM(s) Oral daily  atorvastatin 80 milliGRAM(s) Oral at bedtime  chlorhexidine 0.12% Liquid 15 milliLiter(s) Oral Mucosa every 12 hours  chlorhexidine 2% Cloths 1 Application(s) Topical <User Schedule>  dexMEDEtomidine Infusion 0.5 MICROgram(s)/kG/Hr (12.3 mL/Hr) IV Continuous <Continuous>  folic acid 1 milliGRAM(s) Oral daily  lidocaine   4% Patch 1 Patch Transdermal daily  multivitamin 1 Tablet(s) Oral daily  norepinephrine Infusion 0.05 MICROgram(s)/kG/Min (4.63 mL/Hr) IV Continuous <Continuous>  pantoprazole  Injectable 40 milliGRAM(s) IV Push every 12 hours  phenylephrine    Infusion 0.25 MICROgram(s)/kG/Min (9.25 mL/Hr) IV Continuous <Continuous>  piperacillin/tazobactam IVPB.. 3.375 Gram(s) IV Intermittent every 12 hours  propofol Infusion 10 MICROgram(s)/kG/Min (5.92 mL/Hr) IV Continuous <Continuous>  sodium bicarbonate  Infusion 0.152 mEq/kG/Hr (100 mL/Hr) IV Continuous <Continuous>  thiamine 100 milliGRAM(s) Oral daily  vasopressin Infusion 0.04 Unit(s)/Min (6 mL/Hr) IV Continuous <Continuous>

## 2024-07-12 NOTE — CHART NOTE - NSCHARTNOTEFT_GEN_A_CORE
: Angela Hernandez      INDICATION: shock      PROCEDURE:    [ x ] LIMITED ECHO    [ x ] LIMITED CHEST    FINDINGS:    Bilateral A-line pattern, normal lung sliding. Mildly reduced LV contractility, large LA. No RV enlargement, no septal flattening. TAPSE 1.5cm, IVC 1.7cm.      INTERPRETATION:    Suspect hypovolemia with underlying cardiac systolic&diastolic dysfunction    Images stored on [employer]

## 2024-07-12 NOTE — PROGRESS NOTE ADULT - CONVERSATION DETAILS
Spoke with Yamilet via phone. Discussed updates from o/n, reviewed pt requiring multiple transfusions to maintain H&H and plan for scope this afternoon. Discussed ongoing concern of critical illness. Reviewed code status, Yamilet reinforced pt is full code but she understands the gravity of the situation. she verbalized understanding that blood thinners are stopped due to bleed and pt is at high risk for clots. Discussed ongoing conversation regarding goc based on pt's status. emotional support provided.

## 2024-07-12 NOTE — PROGRESS NOTE ADULT - SUBJECTIVE AND OBJECTIVE BOX
Patient is a 78y old  Male who presents with a chief complaint of hypotension (12 Jul 2024 07:36)    Pt w/ GIB overnight requiring multiple blood products   Pt remains intubated/sedated     MEDICATIONS  (STANDING):  allopurinol 100 milliGRAM(s) Oral two times a day  ascorbic acid 500 milliGRAM(s) Oral daily  atorvastatin 80 milliGRAM(s) Oral at bedtime  chlorhexidine 0.12% Liquid 15 milliLiter(s) Oral Mucosa every 12 hours  chlorhexidine 2% Cloths 1 Application(s) Topical <User Schedule>  dexMEDEtomidine Infusion 0.5 MICROgram(s)/kG/Hr (12.3 mL/Hr) IV Continuous <Continuous>  folic acid 1 milliGRAM(s) Oral daily  lidocaine   4% Patch 1 Patch Transdermal daily  multivitamin 1 Tablet(s) Oral daily  norepinephrine Infusion 0.05 MICROgram(s)/kG/Min (4.63 mL/Hr) IV Continuous <Continuous>  pantoprazole Infusion 8 mG/Hr (10 mL/Hr) IV Continuous <Continuous>  phenylephrine    Infusion 0.25 MICROgram(s)/kG/Min (9.25 mL/Hr) IV Continuous <Continuous>  piperacillin/tazobactam IVPB.. 3.375 Gram(s) IV Intermittent every 12 hours  potassium chloride   Solution 40 milliEquivalent(s) Oral every 4 hours  propofol Infusion 10 MICROgram(s)/kG/Min (5.92 mL/Hr) IV Continuous <Continuous>  sodium bicarbonate  Infusion 0.152 mEq/kG/Hr (100 mL/Hr) IV Continuous <Continuous>  thiamine 100 milliGRAM(s) Oral daily  vasopressin Infusion 0.04 Unit(s)/Min (6 mL/Hr) IV Continuous <Continuous>    MEDICATIONS  (PRN):  fentaNYL    Injectable 50 MICROGram(s) IV Push every 4 hours PRN Severe Pain (7 - 10)  LORazepam   Injectable 2 milliGRAM(s) IV Push every 4 hours PRN Agitation  ondansetron Injectable 4 milliGRAM(s) IV Push every 8 hours PRN Nausea and/or Vomiting      Vital Signs Last 24 Hrs  T(C): 36.9 (12 Jul 2024 08:00), Max: 38.3 (11 Jul 2024 20:00)  T(F): 98.4 (12 Jul 2024 08:00), Max: 100.9 (11 Jul 2024 20:00)  HR: 95 (12 Jul 2024 09:45) (90 - 165)  BP: --  BP(mean): --  RR: 21 (12 Jul 2024 09:45) (20 - 38)  SpO2: 100% (12 Jul 2024 09:45) (93% - 100%)    Parameters below as of 12 Jul 2024 08:00  Patient On (Oxygen Delivery Method): ventilator    O2 Concentration (%): 30    PE  intubated/sedated  Anicteric,   Abd soft, NT, ND  No c/c                        11.5   10.41 )-----------( 111      ( 12 Jul 2024 09:11 )             33.2       07-12    142  |  107  |  96<H>  ----------------------------<  267<H>  3.2<L>   |  16<L>  |  2.87<H>    Ca    6.9<L>      12 Jul 2024 00:38  Phos  5.0     07-12  Mg     1.7     07-12    TPro  4.6<L>  /  Alb  2.1<L>  /  TBili  0.6  /  DBili  x   /  AST  1236<H>  /  ALT  544<H>  /  AlkPhos  241<H>  07-12

## 2024-07-12 NOTE — PROGRESS NOTE ADULT - SUBJECTIVE AND OBJECTIVE BOX
DATE OF SERVICE: 07-12-24 @ 13:43    Patient is a 78y old  Male who presents with a chief complaint of hypotension (12 Jul 2024 12:10)      INTERVAL HISTORY: Intubated and sedated.    REVIEW OF SYSTEMS: Unable to participate in ROS  CONSTITUTIONAL: No weakness  EYES/ENT: No visual changes;  No throat pain   NECK: No pain or stiffness  RESPIRATORY: No cough, wheezing; No shortness of breath  CARDIOVASCULAR: No chest pain or palpitations  GASTROINTESTINAL: No abdominal  pain. No nausea, vomiting, or hematemesis  GENITOURINARY: No dysuria, frequency or hematuria  NEUROLOGICAL: No stroke like symptoms  SKIN: No rashes    TELEMETRY Personally reviewed: SR   	  MEDICATIONS:  norepinephrine Infusion 0.05 MICROgram(s)/kG/Min IV Continuous <Continuous>  phenylephrine    Infusion 0.25 MICROgram(s)/kG/Min IV Continuous <Continuous>        PHYSICAL EXAM:  T(C): 37.3 (07-12-24 @ 12:00), Max: 38.3 (07-11-24 @ 20:00)  HR: 100 (07-12-24 @ 13:15) (75 - 165)  BP: --  RR: 22 (07-12-24 @ 13:15) (20 - 34)  SpO2: 100% (07-12-24 @ 13:15) (95% - 100%)  Wt(kg): --  I&O's Summary    11 Jul 2024 07:01  -  12 Jul 2024 07:00  --------------------------------------------------------  IN: 8414.9 mL / OUT: 1305 mL / NET: 7109.9 mL    12 Jul 2024 07:01  -  12 Jul 2024 13:43  --------------------------------------------------------  IN: 2631.5 mL / OUT: 1200 mL / NET: 1431.5 mL          Appearance: In no distress	  HEENT:    PERRL, EOMI	  Cardiovascular:  S1 S2, No JVD  Respiratory: Lungs clear to auscultation	  Gastrointestinal:  Soft, Non-tender, + BS	  Vascularature:  No edema of LE  Psychiatric: Appropriate affect   Neuro: no acute focal deficits                               11.8   8.77  )-----------( 121      ( 12 Jul 2024 12:57 )             34.3     07-12    140  |  104  |  96<H>  ----------------------------<  273<H>  3.2<L>   |  17<L>  |  2.20<H>    Ca    7.4<L>      12 Jul 2024 09:11  Phos  4.0     07-12  Mg     2.0     07-12    TPro  4.9<L>  /  Alb  2.5<L>  /  TBili  1.1  /  DBili  x   /  AST  687<H>  /  ALT  345<H>  /  AlkPhos  213<H>  07-12        Labs personally reviewed      ASSESSMENT/PLAN: 	    78-year-old male multiple medical problems history of hepatocellular carcinoma status post radiation therapy, AF s/p DCCV on Eliquis who was found to be hypotensive following NST. Patient has reported general weakness, fatigue, lightheadedness since being discharged from hospital. Reports mild chest discomfort in midsternal region. Reports dyspnea with minimal exertion. Also reports significant lack of appetite and poor PO intake. No dark or bloody stool, nausea or vomiting.       Problem/Plan - 1:  ·  Problem: Hypotension  ·  Plan: Continue with pressors in MICU  - Patient presents with hypotension and also RAZIA. Has known orthostatic hypotension.   - Patient and wife report decreased PO intake likely 2/2 malignancy.  - GIB 7/9, s/p 4 units prbc, IR for mesenteric embolization, now in MICU on pressors     Problem/Plan - 2:  ·  Problem: CAD.   ·  Plan: Recent PCI to pLAD in June 2023  - ECG non-ischemic  - Given recurrent bleed, will have to DC Plavix for now. Plan to resume as soon as possible.      Problem/Plan - 3:  ·  Problem: Atrial Fibrillation  - s/p succesful DCCV 10/31  - Hold Eliquis 5mg BID given GIB  - C/w of Sotalol 40mg PO daily (qTC wnl)    Problem/Plan - 4:  ·  Problem: H/O Pericarditis.   ·  Plan: Chest pain now improved since last admission.  -  d/c colchicine 0.6mg PO daily    Problem/Plan - 4:  ·  Problem: Preop Risk Stratification.   ·  Plan: Patient is moderate risk for low risk, urgent endoscopy.  No cardiac contraindication to proceeed.  Ideally should continue Plavix/Cangleror given recent Stent if able.         ROSIO Latham-ESTEFANI Hayes DO [facility]  Cardiovascular Medicine  [address], [address]  Available through call or text on [employer]  Office: [phone]

## 2024-07-12 NOTE — PROGRESS NOTE ADULT - CRITICAL CARE ATTENDING COMMENT
77 yo with CAD s/p PCI (most recent stent 6/12/24), afib (on Eliquis) with hypotension (on midodrine), neuroendocrine tumor admitted with hypotension; hospital course c/b GIB, now transferred to MICU with hemorrhagic shock, RAZIA, shock liver.     #neuro: intubated, sedated  #CV:  - hemorrhagic shock in the setting of GIB, now on 3 pressors  -- chronic hypotension of unclear etiology, thought to be orthostatic. On home midodrine. cortisol levels are appropriately elevated  -- CAD with multiple stents, most recent pLAD on 6/12/24. Hx of Afib. AC and Anti- platelets are on hold given active GIB.  -- trops and CKs are elevated but downtrended. likely demand ischemia Cardiology input appreciated  #Resp: intubated for airway protection during EGD. Will keep intubated today given recurrent bleed  #GI:   -- massive GIB, s/p EGD s/p empiric GDA embolization on 7/9 in IR. s/p 5 U pRBC on 7/9.   -- now with recurrent bleed, s/p 4U pRBC, 2FFP and 2 plt since this am. GI to repeat scope today. IR on standby. C/w PPI gtt  -- elevated LFTs 2/2 shock liver, downtrending  #ID: on empiric zosyn. f/u cultures  #Renal: RAZIA, likely 2/2 ATN. Monitor Cr and UO. c/w bicarb gtt for now  #Endo: monitor FS, check cortisol levels  #Heme: GIB as above. monitor CBC q 4. AC/antiplatelet on hold  #GOC: full code. prognosis guarded.

## 2024-07-12 NOTE — PROGRESS NOTE ADULT - ASSESSMENT
79 yo male with PMH of CAD s/p PCI x3 with most recent stent 6/12/24 on Plavix, chronic Afib on eliquis, orthostatic hypotension on midodrine, PAD, 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    1. Pancreatic NET- metastatic   -- was on lanreotide then had POD in liver and started on peptide receptor radiotherapy (PRRT)- typically given every 8 weeks for 4 doses. He received one dose on 10/20/2023 and planned to get his 2nd dose at the end of December however his course was complicated by multiple hospitalizations that were noncancer related (decompensated heart failure).   -- 5/28/24 PET Dotatate (compared to 9/2023): several new somatostatin avid osseous lesions, some faintly sclerotic, suspicious for mets. Increased upper RP nodes, probably metastatic. Decreased intensely tracer avid right supradiaphragmatic and upper abdominal nodes, suspicious for metastasis. Redemonstrated intensely somatostatin avid bilobar hepatic lesions, consistent with metastases again morphologically difficult to delineate.   -- f/u with Dr. Sophia Prasad at [facility] [place] after [place], no plan for treatment inpatient     2. Hemorrhagic shock, diarrhea  - Follow up cardiology recommendations  - Per endocrine, adrenal insufficiency ruled out given AM cortisol of 17, continues midodrine  - GI PCR indeterminate for norovirus on repeat test  - Chromogranin level pending  - RRT 7/9 for Hypotension, shortness of breath, drop in hemoglobin.   - Found to have + FOBT, CT w/ bleed in duodenum. GI called, EGD 7/9 -> MICU consulted for uncontrolled bleeding followed by A-fib. S/p  IR embolization 7/9, intubated in MICU  - abdominal x ray w/o obstruction  - GIB again overnight on 7/12, plan for scope  - s/p multiple transfusions, will check fibrinogen to r/o DIC  - Once infection ruled out, may start octreotide 150 mcg BID    Christen Rosales NP  Hematology/ Oncology  [facility]  [phone] (office)  [phone] (alt office)  Evenings and weekends please call MD on call or office

## 2024-07-12 NOTE — PROGRESS NOTE ADULT - SUBJECTIVE AND OBJECTIVE BOX
SUBJECTIVE AND OBJECTIVE: pt seen and examined at bedside. pt remains intubated/sedated  Indication for Geriatrics and Palliative Care Services/INTERVAL HPI: GOC     OVERNIGHT EVENTS: GIB, waiting for scope 4uprbc today     DNR on chart:  Allergies    No Known Allergies    Intolerances    MEDICATIONS  (STANDING):  allopurinol 100 milliGRAM(s) Oral two times a day  ascorbic acid 500 milliGRAM(s) Oral daily  atorvastatin 80 milliGRAM(s) Oral at bedtime  chlorhexidine 0.12% Liquid 15 milliLiter(s) Oral Mucosa every 12 hours  chlorhexidine 2% Cloths 1 Application(s) Topical <User Schedule>  dexMEDEtomidine Infusion 0.5 MICROgram(s)/kG/Hr (12.3 mL/Hr) IV Continuous <Continuous>  folic acid 1 milliGRAM(s) Oral daily  lidocaine   4% Patch 1 Patch Transdermal daily  multivitamin 1 Tablet(s) Oral daily  norepinephrine Infusion 0.05 MICROgram(s)/kG/Min (4.63 mL/Hr) IV Continuous <Continuous>  pantoprazole Infusion 8 mG/Hr (10 mL/Hr) IV Continuous <Continuous>  phenylephrine    Infusion 0.25 MICROgram(s)/kG/Min (9.25 mL/Hr) IV Continuous <Continuous>  piperacillin/tazobactam IVPB.. 3.375 Gram(s) IV Intermittent every 12 hours  potassium chloride  10 mEq/100 mL IVPB 10 milliEquivalent(s) IV Intermittent every 1 hour  propofol Infusion 10 MICROgram(s)/kG/Min (5.92 mL/Hr) IV Continuous <Continuous>  sodium bicarbonate  Infusion 0.152 mEq/kG/Hr (100 mL/Hr) IV Continuous <Continuous>  thiamine 100 milliGRAM(s) Oral daily  vasopressin Infusion 0.04 Unit(s)/Min (6 mL/Hr) IV Continuous <Continuous>    MEDICATIONS  (PRN):  fentaNYL    Injectable 50 MICROGram(s) IV Push every 4 hours PRN Severe Pain (7 - 10)  LORazepam   Injectable 2 milliGRAM(s) IV Push every 4 hours PRN Agitation  ondansetron Injectable 4 milliGRAM(s) IV Push every 8 hours PRN Nausea and/or Vomiting      ITEMS UNCHECKED ARE NOT PRESENT    PRESENT SYMPTOMS: [x]Unable to self-report - see [ ] CPOT [x ] PAINADS [ x] RDOS  Source if other than patient:  [ ]Family   [ x]Team     Pain:  [ ]yes [ ]no  QOL impact -   Location -                    Aggravating factors -  Quality -  Radiation -  Timing-  Severity (0-10 scale):  Minimal acceptable level (0-10 scale):     CPOT:    https://www.[facility].org/getattachment/pek53x50-6b4n-9s8a-1k8f-9178t7017x5u/Critical-Care-Pain-Observation-Tool-(CPOT)    PAINAD Score: See PAINAD tool and score below       Dyspnea:                           [ ]Mild [ ]Moderate [ ]Severe    RDOS: See RDOS tool and score below   0 to 2  minimal or no respiratory distress   3  mild distress  4 to 6 moderate distress  >7 severe distress      Anxiety:                             [ ]Mild [ ]Moderate [ ]Severe  Fatigue:                             [ ]Mild [ ]Moderate [ ]Severe  Nausea:                             [ ]Mild [ ]Moderate [ ]Severe  Loss of appetite:              [ ]Mild [ ]Moderate [ ]Severe  Constipation:                    [ ]Mild [ ]Moderate [ ]Severe    PCSSQ[Palliative Care Spiritual Screening Question]   Severity (0-10):  Score of 4 or > indicate consideration of Chaplaincy referral.  Chaplaincy Referral: [ ] yes [ ] refused [ ] following [ ] Deferred     Caregiver [place]? : [ ] yes [ ] no [ ] Deferred [ ] Declined             Social work referral [ ] Patient & Family Centered Care Referral [ ]     Anticipatory Grief present?:  [ ] yes [ ] no  [ ] Deferred                  Social work referral [ ] Chaplaincy Referral [ ]    		  Other Symptoms:  [x ]All other review of systems negative- unable to participate due to AMS     Palliative Performance Status Version 2:   See PPSv2 tool and score below         PHYSICAL EXAM:  Vital Signs Last 24 Hrs  T(C): 37.3 (12 Jul 2024 12:00), Max: 38.3 (11 Jul 2024 20:00)  T(F): 99.1 (12 Jul 2024 12:00), Max: 100.9 (11 Jul 2024 20:00)  HR: 77 (12 Jul 2024 14:00) (75 - 165)  BP: --  BP(mean): --  RR: 24 (12 Jul 2024 14:00) (20 - 34)  SpO2: 100% (12 Jul 2024 14:00) (95% - 100%)    Parameters below as of 12 Jul 2024 08:00  Patient On (Oxygen Delivery Method): ventilator    O2 Concentration (%): 30 I&O's Summary    11 Jul 2024 07:01  -  12 Jul 2024 07:00  --------------------------------------------------------  IN: 8414.9 mL / OUT: 1305 mL / NET: 7109.9 mL    12 Jul 2024 07:01  -  12 Jul 2024 14:13  --------------------------------------------------------  IN: 3003 mL / OUT: 1300 mL / NET: 1703 mL       GENERAL: [ ]Cachexia    [ ]Alert  [ ]Oriented x   [ ]Lethargic  [x ]Unarousable  [ ]Verbal  [ ]Non-Verbal  Behavioral:   [ ]Anxiety  [ ]Delirium [ ]Agitation [ ]Other  HEENT:  [ ]Normal   [ ]Dry mouth   [ x]ET Tube/Trach  [ ]Oral lesions  PULMONARY:   [ ]Clear [ ]Tachypnea  [ ]Audible excessive secretions   [ ]Rhonchi        [ ]Right [ ]Left [ ]Bilateral  [ ]Crackles        [ ]Right [ ]Left [ ]Bilateral  [ ]Wheezing     [ ]Right [ ]Left [ ]Bilateral  [x ]Diminished BS [ ] Right [ ]Left [ x]Bilateral  CARDIOVASCULAR:    [ ]Regular [ ]Irregular [x]Tachy  [ ]Lavelle [ ]Murmur [ ]Other  GASTROINTESTINAL:  [ x]Soft  [ ]Distended   [x]+BS  [ x]Non tender [ ]Tender  [ ]Other [ ]PEG [ x]OGT/ NGT   Last BM:   GENITOURINARY:  [ ]Normal [ ]Incontinent   [ ]Oliguria/Anuria   [x ]Ivory  MUSCULOSKELETAL:   [ ]Normal   [x ]Weakness  [ x]Bed/Wheelchair bound [ ]Edema  NEUROLOGIC:   [ ]No focal deficits  [x ] Cognitive impairment  [ ] Dysphagia [ ]Dysarthria [ ] Paresis [ ]Other   SKIN:   [ ]Normal  [ ]Rash  [ ]Other  [ ]Pressure ulcer(s) [ ]y [ ]n present on admission    CRITICAL CARE:  [x ]Shock Present  [ ]Septic [ ]Cardiogenic [ ]Neurologic [x ]Hypovolemic  [x ]Vasopressors [ ]Inotropes  [ ]Respiratory failure present [x ]Mechanical Ventilation [ ]Non-invasive ventilatory support [ ]High-Flow Mode: AC/ CMV (Assist Control/ Continuous Mandatory Ventilation), RR (machine): 16, TV (machine): 450, FiO2: 30, PEEP: 5, ITime: 1, MAP: 10, PIP: 27  [ ]Acute  [ ]Chronic [ ]Hypoxic  [ ]Hypercarbic [ ]Other  [ ]Other organ failure     LABS:                        11.8   8.77  )-----------( 121      ( 12 Jul 2024 12:57 )             34.3   07-12    140  |  104  |  96<H>  ----------------------------<  273<H>  3.2<L>   |  17<L>  |  2.20<H>    Ca    7.4<L>      12 Jul 2024 09:11  Phos  4.0     07-12  Mg     2.0     07-12    TPro  4.9<L>  /  Alb  2.5<L>  /  TBili  1.1  /  DBili  x   /  AST  687<H>  /  ALT  345<H>  /  AlkPhos  213<H>  07-12  PT/INR - ( 12 Jul 2024 12:57 )   PT: 14.0 sec;   INR: 1.34 ratio         PTT - ( 12 Jul 2024 12:57 )  PTT:32.2 sec    Urinalysis Basic - ( 12 Jul 2024 09:11 )    Color: x / Appearance: x / SG: x / pH: x  Gluc: 273 mg/dL / Ketone: x  / Bili: x / Urobili: x   Blood: x / Protein: x / Nitrite: x   Leuk Esterase: x / RBC: x / WBC x   Sq Epi: x / Non Sq Epi: x / Bacteria: x      RADIOLOGY & ADDITIONAL STUDIES:  < from: Xray Abdomen 1 View PORTABLE -Urgent (Xray Abdomen 1 View PORTABLE -Urgent .) (07.11.24 @ 12:17) >    ACC: [id number] EXAM:  XR ABDOMEN PORTABLE URGENT 1V   ORDERED BY:  LUDWIG CABA     PROCEDURE DATE:  07/11/2024          INTERPRETATION:  CLINICAL INDICATION: No bowel movement. New NGT   placement. Evaluate for obstruction    TECHNIQUE: Frontal radiograph of the abdomen.    COMPARISON: CT abdomen and pelvis 7/9/2024    INTERPRETATION:    Thoracolumbar spinal hardware. Coil material overlies the right lower   quadrant.    Enteric tube overlies the mid abdomen and courses towards the pelvis,   however its tip is obscured by spinal hardware.    Nonobstructive bowel gas pattern..    IMPRESSION:    Enteric tube overlies the mid abdomen and courses towards the pelvis,   however its tip is obscured by spinal hardware.    Nonobstructive bowel gas pattern.    --- End of Report ---           ROSA HINOJOSA MD; Resident Radiologist  This document has been electronically signed.  ALISON REED MD; Attending Radiologist  This document has been electronically signed. Jul 11 2024 12:26PM    < end of copied text >    Protein Calorie Malnutrition Present: [ ]mild [ ]moderate [ ]severe [ ]underweight [ ]morbid obesity  https://www.ande[employer].org/vault/2440/web/files/ONC/Table_Clinical%20Characteristics%20to%20Document%20Malnutrition-White%20JV%20et%20al%659284.pdf    Height (cm): 188 (07-09-24 @ 13:27), 188 (06-24-24 @ 13:43), 188 (06-12-24 @ 11:58)  Weight (kg): 98.7 (07-09-24 @ 13:27), 102.5 (06-24-24 @ 13:43), 102.1 (06-12-24 @ 11:58)  BMI (kg/m2): 27.9 (07-09-24 @ 13:27), 29 (06-24-24 @ 13:43), 28.9 (06-12-24 @ 11:58)    [ ]PPSV2 < or = 30%  [ ]significant weight loss [ ]poor nutritional intake [ ]anasarca[ ]Artificial Nutrition    Other REFERRALS:  [ ]Hospice  [ ]Child Life  [ ]Social Work  [ ]Case management [ ]Holistic Therapy     Goals of Care Document:EMMA Oliver (10-12-21 @ 13:00)  Goals of Care Conversation:   Participants:  · Participants  Patient    Advance Directives:  · Does patient have Advance Directive  No  · Do you want to complete the HCP and name a [facility] Care Agent  Yes  pending    Conversation Discussion:  · Conversation  MOLST Discussed  · Conversation Details  Pt was seen for goals of care conversation. Pt was provided the video code BHJN and MOLST form for completion.  pt was very receptive and was happy that this service was provided.    What Matters Most To Patient and Family:  · What matters most to patient and family  Remain full code, wants to return to his home and personal life.    Personal Advance Directives Treatment Guidelines:   Treatment Guidelines:  · Decision Maker  Patient  · Treatment Guideline Comments  full code    Location of Discussion:   Duration of Advanced Care Planning Meeting:  · Time spent (in minutes)  6    Location of Discussion:  · Location of discussion  Face to face      Electronic Signatures:  La Oliver (RN)  (Signed 12-Oct-2021 18:17)  	Authored: Goals of Care Conversation, Personal Advance Directives Treatment Guidelines, Location of Discussion      Last Updated: 12-Oct-2021 18:17 by La Oliver (RN)

## 2024-07-12 NOTE — PROGRESS NOTE ADULT - ASSESSMENT
77 yo male with PMH of CAD s/p PCI x3 with most recent stent 6/12/24 on Plavix, chronic Afib on eliquis, orthostatic hypotension on midodrine, PAD, 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 Patient states since discharge on this past Saturday, he continued to feel ongoing generalized weakness and dizziness with positional changes despite compliance with home midodrine 15mg TID and holding torsemide as instructed as of day PTA . Admits to poor PO intake of fluids/solute due to ongoing issues with poor appetite and nausea with meals which is not new. In the ED, vitals notable for SBP 92-11s. Labs notable for elevated BUN/Cr 31/1.59 (from 30/1.18 on day of discharge 6/29/24). CXR with clear lungs. Abd US with innumerable intrahepatic echogenic masses consistent with known metastatic neuroendocrine tumor. Admitted to medicine for symptomatic hypotension and RAZIA.      1- RAZIA   2- orthostatic hypotension   3- hx chf   4- pericarditis   5- diarrhea       RAZIA in setting of hypotension, anemia -> GIB, hemorrhagic shock leading to oliguric ATN  received CT IV contrast, and emergently went to IR for mesenteric embolization 7/9  now creatinine is improving  non oliguric   decreasing pressor requirements.   acidosis, continue sodium bicarb drip  strict I/O  monitor creatinine closely

## 2024-07-13 NOTE — CHART NOTE - NSCHARTNOTEFT_GEN_A_CORE
NUTRITION FOLLOW UP NOTE    PATIENT SEEN FOR: consult for tube feeding    SOURCE: [] Patient  [x] Current Medical Record  [x] RN  [] Family/support person at bedside  [x] Patient unavailable/inappropriate  [x] Other: interdisciplinary team     CHART REVIEWED/EVENTS NOTED.  [] No changes to nutrition care plan to note  [x] Nutrition Status:  - Intubated 7/9. On Propofol at 11.8 mL/hr (312 kcals/day)  - Hemorrhagic shock. Pressors: Phenylephrine at 2.4 micrograms/kG/min   - RAZIA  - Upper GI Bleed s/p 5 units pRBC prior to MICU admission     DIET ORDER:   Diet, NPO with Tube Feed:   Supplement Feeding Modality:  Orogastric  Ensure Clear Cans or Servings Per Day:  2       Frequency:  Two Times a day (07-13-24 @ 15:03)    CURRENT DIET ORDER IS:  [] Appropriate:  [] Inadequate:  [] Other: See recommendations below     NUTRITION INTAKE/PROVISION:  [x] PO: NPO (7/8->7/13); just cleared by GI for clears   [] Enteral Nutrition:  [] Parenteral Nutrition:    ANTHROPOMETRICS:  Drug Dosing Weight  Height (cm): 188 (09 Jul 2024 13:27)  Weight (kg): 98.7 (09 Jul 2024 13:27)  BMI (kg/m2): 27.9 (09 Jul 2024 13:27)    Weights:   Daily: None to address. RD unable to obtain bed scale wt     NUTRITIONALLY PERTINENT MEDICATIONS:  MEDICATIONS  (STANDING):  clopidogrel Tablet 75 milliGRAM(s) Enteral Tube daily  dexMEDEtomidine Infusion 3 MICROgram(s)/kG/Hr (74 mL/Hr) IV Continuous <Continuous>  folic acid Injectable 1 milliGRAM(s) IV Push daily  pantoprazole  Injectable 40 milliGRAM(s) IV Push every 12 hours  phenylephrine    Infusion 0.25 MICROgram(s)/kG/Min (9.25 mL/Hr) IV Continuous <Continuous>  piperacillin/tazobactam IVPB.. 3.375 Gram(s) IV Intermittent every 12 hours  potassium chloride  10 mEq/100 mL IVPB 10 milliEquivalent(s) IV Intermittent every 1 hour  propofol Infusion 10 MICROgram(s)/kG/Min (5.92 mL/Hr) IV Continuous <Continuous>  thiamine Injectable 100 milliGRAM(s) IV Push daily    NUTRITIONALLY PERTINENT LABS:  07-13 Na141 mmol/L Glu 158 mg/dL<H> K+ 2.8 mmol/L<LL> Cr  1.65 mg/dL<H> BUN 74 mg/dL<H>   07-13 Phos 3.0 mg/dL 07-13 Alb 2.4 g/dL<L>  07-13  U/L<H>  U/L<H> Alkaline Phosphatase 207 U/L<H>    NUTRITIONALLY PERTINENT MEDICATIONS/LABS:  [x] Reviewed  [x] Relevant notes on medications/labs:  - Low K+ noted, ordered for KCl  - Ordered for thiamine and folic acid   - On antibiotics     EDEMA:  [x] Reviewed: +3 generalized  [] Relevant notes:    GI/ I&O:  [x] Reviewed  [x] Relevant notes: Last BM 7/13 x5 thus far   [x] Other: Extensive GI Hx, see note. Duodenal ulcer; Gastrointestinal hemorrhage    SKIN:   [x] No pressure injuries documented, per nursing flowsheet  [] Pressure injury previously noted  [] Change in pressure injury documentation:  [] Other:    ESTIMATED NEEDS:  Based on dosing wt 98.7 kg   Energy: (20-25 kcal/kg) 1972 - 2468 kcal/day  Protein: (1.2-1.4 g/kg) 118 - 138 g/day   Fluid needs deferred to provider.   [facility] Equation: 2160 kcals (7/13)    NUTRITION DIAGNOSIS:  [x] Prior Dx: 1) Severe chronic malnutrition 2) Increased Nutrient Needs  [] New Dx:    EDUCATION:  [] Yes:  [x] Not appropriate/warranted    NUTRITION CARE PLAN:  1. Diet: Ensure Clear at 10 mL/hr increasing only as tolerated and electrolytes WNL to goal rate 65 mL/hr x18 hours to provide total volume 1170 mL, 1497 kcals (with 312 kcals/day from propofol), 39.5 gm protein. Meets 15 kcals/kG and 0.4 gm protein/kG based on dosing wt 98.7 kG.   -> Noted regimen will not meet estimated needs as pt can only be on clear liquids. Reconsult RD when able to advance diet  2. Multivitamin/mineral supplementation: As medically feasible, continue to provide thiamine and consider multivitamin for refeeding risk.     [] Achieved - Continue current nutrition intervention(s)  [] Current medical condition precludes nutrition intervention at this time.    MONITORING AND EVALUATION:   RD remains available upon request and will follow up per protocol.    Khadijah Musa, MS, RD, CDN, CNSC, CDCES TEAMS Yes

## 2024-07-13 NOTE — PROGRESS NOTE ADULT - SUBJECTIVE AND OBJECTIVE BOX
[facility]   [phone]  RENAL FOLLOW UP NOTE  --------------------------------------------------------------------------------  Chief Complaint:    24 hour events/subjective:    patient seen and examined.   intubated    PAST HISTORY  --------------------------------------------------------------------------------  No significant changes to PMH, PSH, FHx, SHx, unless otherwise noted    ALLERGIES & MEDICATIONS  --------------------------------------------------------------------------------  Allergies    No Known Allergies    Intolerances      Standing Inpatient Medications  allopurinol 100 milliGRAM(s) Oral two times a day  ascorbic acid 500 milliGRAM(s) Oral daily  atorvastatin 80 milliGRAM(s) Oral at bedtime  chlorhexidine 0.12% Liquid 15 milliLiter(s) Oral Mucosa every 12 hours  chlorhexidine 2% Cloths 1 Application(s) Topical <User Schedule>  dexMEDEtomidine Infusion 0.5 MICROgram(s)/kG/Hr IV Continuous <Continuous>  folic acid 1 milliGRAM(s) Oral daily  lidocaine   4% Patch 1 Patch Transdermal daily  multivitamin 1 Tablet(s) Oral daily  norepinephrine Infusion 0.05 MICROgram(s)/kG/Min IV Continuous <Continuous>  pantoprazole Infusion 8 mG/Hr IV Continuous <Continuous>  phenylephrine    Infusion 0.25 MICROgram(s)/kG/Min IV Continuous <Continuous>  piperacillin/tazobactam IVPB.. 3.375 Gram(s) IV Intermittent every 12 hours  potassium chloride  10 mEq/100 mL IVPB 10 milliEquivalent(s) IV Intermittent every 1 hour  propofol Infusion 10 MICROgram(s)/kG/Min IV Continuous <Continuous>  sodium bicarbonate  Infusion 0.152 mEq/kG/Hr IV Continuous <Continuous>  thiamine 100 milliGRAM(s) Oral daily  vasopressin Infusion 0.04 Unit(s)/Min IV Continuous <Continuous>    PRN Inpatient Medications  fentaNYL    Injectable 50 MICROGram(s) IV Push every 4 hours PRN  LORazepam   Injectable 2 milliGRAM(s) IV Push every 4 hours PRN  ondansetron Injectable 4 milliGRAM(s) IV Push every 8 hours PRN      REVIEW OF SYSTEMS  --------------------------------------------------------------------------------    patient is unable to give ROS    VITALS/PHYSICAL EXAM  --------------------------------------------------------------------------------  T(C): 37.3 (07-12-24 @ 12:00), Max: 38.3 (07-11-24 @ 20:00)  HR: 100 (07-12-24 @ 13:15) (75 - 165)  BP: --  RR: 22 (07-12-24 @ 13:15) (20 - 34)  SpO2: 100% (07-12-24 @ 13:15) (95% - 100%)  Wt(kg): --        07-11-24 @ 07:01  -  07-12-24 @ 07:00  --------------------------------------------------------  IN: 8414.9 mL / OUT: 1305 mL / NET: 7109.9 mL    07-12-24 @ 07:01  -  07-12-24 @ 14:10  --------------------------------------------------------  IN: 2631.5 mL / OUT: 1200 mL / NET: 1431.5 mL                        13.6   14.94 )-----------( 114      ( 13 Jul 2024 17:51 )             39.7       CBC Full  -  ( 13 Jul 2024 17:51 )  WBC Count : 14.94 K/uL  RBC Count : 4.61 M/uL  Hemoglobin : 13.6 g/dL  Hematocrit : 39.7 %  Platelet Count - Automated : 114 K/uL  Mean Cell Volume : 86.1 fl  Mean Cell Hemoglobin : 29.5 pg  Mean Cell Hemoglobin Concentration : 34.3 gm/dL  Auto Neutrophil # : 13.01 K/uL  Auto Lymphocyte # : 0.75 K/uL  Auto Monocyte # : 0.82 K/uL  Auto Eosinophil # : 0.10 K/uL  Auto Basophil # : 0.04 K/uL  Auto Neutrophil % : 87.0 %  Auto Lymphocyte % : 5.0 %  Auto Monocyte % : 5.5 %  Auto Eosinophil % : 0.7 %  Auto Basophil % : 0.3 %      07-13    143  |  111<H>  |  70<H>  ----------------------------<  109<H>  3.3<L>   |  17<L>  |  1.54<H>    Ca    7.7<L>      13 Jul 2024 17:51  Phos  3.0     07-13  Mg     2.0     07-13    TPro  5.1<L>  /  Alb  2.2<L>  /  TBili  1.2  /  DBili  x   /  AST  182<H>  /  ALT  155<H>  /  AlkPhos  219<H>  07-13      CAPILLARY BLOOD GLUCOSE          Vital Signs Last 24 Hrs  T(C): 38.4 (13 Jul 2024 20:00), Max: 38.4 (13 Jul 2024 20:00)  T(F): 101.1 (13 Jul 2024 20:00), Max: 101.1 (13 Jul 2024 20:00)  HR: 109 (13 Jul 2024 21:02) (65 - 125)  BP: --  BP(mean): --  RR: 26 (13 Jul 2024 20:45) (19 - 38)  SpO2: 100% (13 Jul 2024 21:02) (99% - 100%)    Parameters below as of 13 Jul 2024 20:00  Patient On (Oxygen Delivery Method): ventilator    O2 Concentration (%): 30    Urinalysis Basic - ( 13 Jul 2024 17:51 )    Color: x / Appearance: x / SG: x / pH: x  Gluc: 109 mg/dL / Ketone: x  / Bili: x / Urobili: x   Blood: x / Protein: x / Nitrite: x   Leuk Esterase: x / RBC: x / WBC x   Sq Epi: x / Non Sq Epi: x / Bacteria: x        PT/INR - ( 12 Jul 2024 21:12 )   PT: 13.4 sec;   INR: 1.29 ratio         PTT - ( 12 Jul 2024 21:12 )  PTT:33.5 sec    Physical Exam:  	  	Gen: intubated, sedated  	Pulm: decrease bs  no rales or ronchi or wheezing  	CV: No JVD. RRR, S1S2; no rub  	Abd: no BS, softly distended  	: daysi  	UE: Warm, no cyanosis  no clubbing,  no edema  	LE: Warm, no cyanosis  no clubbing, no edema    LABS/STUDIES  --------------------------------------------------------------------------------              11.8   8.77  >-----------<  121      [07-12-24 @ 12:57]              34.3     140  |  104  |  96  ----------------------------<  273      [07-12-24 @ 09:11]  3.2   |  17  |  2.20        Ca     7.4     [07-12-24 @ 09:11]      Mg     2.0     [07-12-24 @ 09:11]      Phos  4.0     [07-12-24 @ 09:11]    TPro  4.9  /  Alb  2.5  /  TBili  1.1  /  DBili  x   /  AST  687  /  ALT  345  /  AlkPhos  213  [07-12-24 @ 09:11]    PT/INR: PT 14.0 , INR 1.34       [07-12-24 @ 12:57]  PTT: 32.2       [07-12-24 @ 12:57]    CK 1668      [07-12-24 @ 09:11]    Creatinine Trend:  SCr 2.20 [07-12 @ 09:11]  SCr 2.87 [07-12 @ 00:38]  SCr 3.21 [07-11 @ 16:09]  SCr 3.38 [07-11 @ 07:58]  SCr 3.12 [07-10 @ 23:20]              TSH 2.47      [07-04-24 @ 12:31]

## 2024-07-13 NOTE — PROGRESS NOTE ADULT - ATTENDING COMMENTS
Seen and examined. 78M CAD s/p PCI (most recently 6/12/24) on plavix, Afib (usually on AC), , NE tumor of liver (unknown primary) s/p RT, PAD who developed melena and CGE on 7/9, found to have LA D esophagitis and active bleed in 2nd portion of duodenum but large clot prevented adequate visualization, though epi injection and cautery to area of oozing were still attempted and were unsuccessful. Patient subsequently underwent empiric embo of GDA with IR but had continued bleeding as evidenced by downtrending hgb, pressor requirement, melena. Underwent repeat EGD yesterday 7/12, Arjun IIB lesions in duodenum with clots unroofed however apart from pigmented spots and oozing no clear targets for intervention were identified. Purastat was applied to all areas of oozing, but no active bleeding was seen.    Hgb stable this am without transfusion, dark brown (NOT melena) stool seen on sallie during exam, remains intubated, abd soft. BUN downtrending though difficult to interpret in s/o RAZIA. Transaminases and alk phos downtrending (likely improving shock liver).    #Duodenal ulcer  #Gastrointestinal hemorrhage    - IV PPI BID x72h, then PO BID for 8 weeks  - will need repeat EGD in 8 weeks to ensure healing of esophagitis and duodenal ulcers  - if persistent hypotension, evaluate for alternate sources unless overt bleeding  - ok for CLD  - ok for Plavix Seen and examined. 78M CAD s/p PCI (most recently 6/12/24) on plavix, Afib (usually on AC), , NE tumor of liver (unknown primary) s/p RT, PAD who developed melena and CGE on 7/9, found to have LA D esophagitis and active bleed in 2nd portion of duodenum but large clot prevented adequate visualization, though epi injection and cautery to area of oozing were still attempted and were unsuccessful. Patient subsequently underwent empiric embo of GDA with IR but had continued bleeding as evidenced by downtrending hgb, pressor requirement, melena. Underwent repeat EGD yesterday 7/12, Arjun IIB lesions in duodenum with clots unroofed however apart from pigmented spots and oozing no clear targets for intervention were identified. Purastat was applied to all areas of oozing, but no active bleeding was seen.    Hgb stable this am without transfusion, dark brown (NOT melena) stool seen on sallie during exam, remains intubated, abd soft. BUN downtrending though difficult to interpret in s/o RAZIA. Transaminases and alk phos downtrending (likely improving shock liver).    #Duodenal ulcer  #Gastrointestinal hemorrhage  #LA D esophagitis    - IV PPI BID x72h, then PO BID for 8 weeks  - will need repeat EGD in 8 weeks to ensure healing of esophagitis and duodenal ulcers  - if persistent hypotension, evaluate for alternate sources unless overt bleeding  - ok for CLD  - ok for Plavix

## 2024-07-13 NOTE — PROGRESS NOTE ADULT - ASSESSMENT
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 Found to have active bleed in second portion of duodenum, unable to identify source underneath large clot on initial EGD. Possible that source is from bleed from duodenal NE tumor vs duodenal ulcer. Now s/p empiric embolization of GDA by IR for bleeding control. Hgb dropped after starting cangrelor with worsening renal and liver failure. Repeat EGD was performed 7/12 with organized clots around extensive duodenal ulceration without active bleeding, treated with purastat.   Hgb stabilized without transfusion requirement since 7/12 AM.     Recommendations:  - Monitor for recurrent bleeding  - IV PPI BID   - Okay for OGT placement and initiation of trickle feeds  - No absolute contraindication to restarting Plavix given recency of stent placement, however remains at high bleeding risk   - Trend CBC daily and transfuse to goal Hgb >8 g/dL   - respiratory and circulatory support per ICU team  - Appreciate Palliative care involvement given guarded prognosis  - No plans for repeat endoscopic intervention    Joey Vernon MD  Gastroenterology/Hepatology Fellow  Available via [employer] Teams  Pager: [phone]    On Weekdays after 5 PM to 8AM and Weekends/Holidays (All day)  For non-urgent consults, please email [email or web address]  For urgent consults, please contact on call GI team.  See Amion schedule ([facility]), CAPNIAing system - [phone] ([facility]), or call hospital  ([facility])

## 2024-07-13 NOTE — PROGRESS NOTE ADULT - SUBJECTIVE AND OBJECTIVE BOX
DATE OF SERVICE: 07-13-24 @ 08:30    Patient is a 78y old  Male who presents with a chief complaint of hypotension (12 Jul 2024 14:10)      INTERVAL HISTORY: no acute distress     REVIEW OF SYSTEMS:  CONSTITUTIONAL: No weakness  EYES/ENT: No visual changes;  No throat pain   NECK: No pain or stiffness  RESPIRATORY: No cough, wheezing; No shortness of breath  CARDIOVASCULAR: No chest pain or palpitations  GASTROINTESTINAL: No abdominal  pain. No nausea, vomiting, or hematemesis  GENITOURINARY: No dysuria, frequency or hematuria  NEUROLOGICAL: No stroke like symptoms  SKIN: No rashes    TELEMETRY Personally reviewed:  SR   	  MEDICATIONS:  phenylephrine    Infusion 0.25 MICROgram(s)/kG/Min IV Continuous <Continuous>        PHYSICAL EXAM:  T(C): 37.8 (07-13-24 @ 08:00), Max: 38.4 (07-12-24 @ 18:15)  HR: 106 (07-13-24 @ 08:00) (65 - 120)  BP: --  RR: 28 (07-13-24 @ 08:00) (17 - 30)  SpO2: 100% (07-13-24 @ 08:00) (88% - 100%)  Wt(kg): --  I&O's Summary    12 Jul 2024 07:01  -  13 Jul 2024 07:00  --------------------------------------------------------  IN: 6345.5 mL / OUT: 3460 mL / NET: 2885.5 mL    13 Jul 2024 07:01  -  13 Jul 2024 08:30  --------------------------------------------------------  IN: 183.5 mL / OUT: 0 mL / NET: 183.5 mL          Appearance: In no distress	  HEENT:    PERRL, EOMI	  Cardiovascular:  S1 S2, No JVD  Respiratory: Lungs clear to auscultation	  Gastrointestinal:  Soft, Non-tender, + BS	  Vascularature:  No edema of LE  Psychiatric: Appropriate affect   Neuro: no acute focal deficits                               12.5   12.31 )-----------( 108      ( 13 Jul 2024 05:56 )             36.3     07-13    141  |  106  |  78<H>  ----------------------------<  186<H>  3.1<L>   |  18<L>  |  1.74<H>    Ca    7.7<L>      13 Jul 2024 05:56  Phos  3.3     07-13  Mg     2.1     07-13    TPro  5.0<L>  /  Alb  2.4<L>  /  TBili  1.0  /  DBili  x   /  AST  281<H>  /  ALT  198<H>  /  AlkPhos  205<H>  07-13        Labs personally reviewed      ASSESSMENT/PLAN: 	  78-year-old male multiple medical problems history of hepatocellular carcinoma status post radiation therapy, AF s/p DCCV on Eliquis who was found to be hypotensive following NST. Patient has reported general weakness, fatigue, lightheadedness since being discharged from hospital. Reports mild chest discomfort in midsternal region. Reports dyspnea with minimal exertion. Also reports significant lack of appetite and poor PO intake. No dark or bloody stool, nausea or vomiting.       Problem/Plan - 1:  ·  Problem: Hypotension  ·  Plan: Continue with pressors in MICU  - Patient presents with hypotension and also RAZIA. Has known orthostatic hypotension.   - Patient and wife report decreased PO intake likely 2/2 malignancy.  - GIB 7/9, s/p 4 units prbc, IR for mesenteric embolization, now in MICU on pressors     Problem/Plan - 2:  ·  Problem: CAD.   ·  Plan: Recent PCI to pLAD in June 2023  - ECG non-ischemic  - Given recurrent bleed, will have to DC Plavix for now. Plan to resume as soon as possible.      Problem/Plan - 3:  ·  Problem: Atrial Fibrillation  - s/p succesful DCCV 10/31  - Hold Eliquis 5mg BID given GIB  - C/w of Sotalol 40mg PO daily (qTC wnl)    Problem/Plan - 4:  ·  Problem: H/O Pericarditis.   ·  Plan: Chest pain now improved since last admission.  -  d/c colchicine 0.6mg PO daily    Problem/Plan - 4:  ·  Problem: Preop Risk Stratification.   ·  Plan: Patient is moderate risk for low risk, urgent endoscopy.  No cardiac contraindication to proceeed.  Ideally should continue Plavix/Cangleror given recent Stent if able.     Problem/Plan - 5:  ·  Problem: CKD  ·  Plan: RAZIA in setting of hypotension, anemia -> GIB, hemorrhagic shock leading to oliguric ATN received CT IV contrast, and emergently went to IR for mesenteric embolization 7/9  now creatinine is improving, non oliguric , decreasing pressor requirements.   acidosis, continue sodium bicarb drip  strict I/O  monitor creatinine closely  nephro on board     Problem/Plan - 5:  ·  Problem: DVT prophylactic   ·  Plan: SCD's , OOB-CHAIR   - resume Eliquis when hemodynamically stable          ESTEFANI Velez,  [facility]  Cardiovascular Medicine  [address] [place], [address]  Available through call or text on [employer] TEAMs  Office: [phone]

## 2024-07-13 NOTE — PROGRESS NOTE ADULT - ASSESSMENT
77 yo male with PMH of CAD s/p PCI x3 with most recent stent 6/12/24 on Plavix, chronic Afib on eliquis, orthostatic hypotension on midodrine, PAD, neuroendocrine tumor with RT currently on hold, recent admission for dx cath on 6/24/24 who presented for hypotension, admitted for GIB and hemorrhagic shock. Found to be bleeding from duodenum.     1. Pancreatic NET- metastatic   -- was on lanreotide then had POD in liver and started on peptide receptor radiotherapy (PRRT)- typically given every 8 weeks for 4 doses. He received one dose on 10/20/2023 and planned to get his 2nd dose at the end of December however his course was complicated by multiple hospitalizations that were noncancer related (decompensated heart failure).   -- 5/28/24 PET Dotatate (compared to 9/2023): several new somatostatin avid osseous lesions, some faintly sclerotic, suspicious for mets. Increased upper RP nodes, probably metastatic. Decreased intensely tracer avid right supradiaphragmatic and upper abdominal nodes, suspicious for metastasis. Redemonstrated intensely somatostatin avid bilobar hepatic lesions, consistent with metastases again morphologically difficult to delineate.   -- CT here reviewed by [facility]: SINCE MAY 8 2024 INCREASED HEPATIC METASTASES, NEWLY SEEN PRIMARY TUMOR IN THE PANCREAS, and active bleeding within the duodenum with associated intraluminal hematoma.   -- chromogranin level is elevated at 2058, but no prior level at [facility] for review   -- f/u with Dr. Sophia Prasad at [facility] after discharge, no plan for treatment inpatient, if clinically improves/stabilizes then can be considered for treatment outpatient    2. Duodenal bleed, Hemorrhagic shock  - Cardiology following, currently on 2 pressors   - Per endocrine, adrenal insufficiency ruled out given AM cortisol of 17, continues midodrine  - GI PCR indeterminate for norovirus on repeat test  - RRT 7/9 for Hypotension, shortness of breath, drop in hemoglobin.   - Found to have + FOBT, CT w/ bleed in duodenum. GI called, EGD 7/9 -> MICU consulted for uncontrolled bleeding followed by A-fib. S/p  IR embolization 7/9, intubated in MICU  - s/p multiple transfusions, fibrinogen low WOULD GIVE CRYO; would consider DIC but coags have improved as are essentially normal now so unlikely, probably was related to liver dysfunction. Can check factor levels V, VIII, X   - Once infection ruled out, may start octreotide 150 mcg BID  - s/p repeat EGD 7/12 showing duodenal lesions w/clots and oozing, no clear targets for intervention and no active bleeding, purastat applied to all areas of oozing. Hgb stable today     Siri Baron MD  Hematology-Oncology   [facility]  [phone] (Office)

## 2024-07-13 NOTE — PROGRESS NOTE ADULT - ASSESSMENT
Assessment: 77 yo male with metastatic neuroendocrine pancreatic cancer (tx on hold) and PMH of CAD s/p PCI x3 with most recent stent 6/12/24 on Plavix and eliquis , chronic Afib on eliquis, orthostatic hypotension on midodrine, PAD , recent admission for dx cath on 6/24/24 presented from PCP's office for hypotension despite taking AM midodrine dose on 7/2, admitted to medicine for symptomatic hypotension and RAZIA. Per chart, on 7/8 PM, pt was noted to have acute onset of melena x5 and coffee ground emesis x2-3. RRT was called on 7/9 AM for hypotension, hgb dropped from 9.2 to 7.4 (admission baseline 10-11), FOBT +, pt was started on PPI IV and transfused 1 unit of pRBC. GI was consulted and pt is scheduled for EGD on 7/9 afternoon. MICU was consulted for uncontrolled bleeding during EGD. During EGD, pt became hypotensive and was given phenylephrine, had A-fib with RVR s/p amiodarone. Now s/p IR embolization, admitted to MICU for further moniring i.s.o hemorrhagic shock 2/2 GI bleed     Plan:     Neuro   # Baseline AOx3  - intubated   - on precedex   - on henny   - fentanyl prn for pain control (last dose 7/12/24)    CVS  # hemorrhagic shock   - RRT called 7/9 for hypotension   -  2/2 to Upper GI bleeds, urgently took to EGD, found bleeding ulcer that was not well controlled, underwent IR empiric embolization 7/9  - restarted midodrine   - on henny, maintain MAP > 65; took off vaso and precedex (7/13/24), he got tachy to 118. Added precedex back.   - AM cortisol 17 (7/5), AM cortisol 37.1 (7/11)   - transfuse as needed   - restarted plavix 7/13/24 for new stent (6/12/24) per GI       #CAD s/p PCI x3, most recent stent 6/12/24, NURIA to pLAD   - c/w statin   - restarted plavix 7/13/24 for new stent (6/12/24) per GI     #PAD  # A-fib on eliquis    - s/p successful DCCV 10/31   - hold sotalol given hypotension 2/2 GI bleeds   - hold eliquis     PULM   #Intubated for airway protection prior to EGD/IR embolization  - will hold off on SBT if any further procedures are planned   -AC 16/450/5/30    Renal/  #RAZIA   #ATN i.s.o hypotension  #metabolic acidosis   - f/u nephro   - trend BUN/Cr   - monitor Is and Os   - start D5 with sodium bicarbonate     GI   #Upper GI Bleed   - s/p 5 units pRBC prior to MICU   - CT A/P 7/9 - Active bleeding in the third portion of the duodenum. Progression of liver metastases.  - EGD 7/9 showed esophagitis, One non-bleeding duodenal ulcer with a clean ulcer base (Arjun Class III). One oozing duodenal ulcer with spurting hemorrhage (Arjun Class Ia). Treatment not successful. Treated with bipolar cautery.  - s/p IR embolization on 7/9  - per GI recs, change PPI gtt to PPI IV BID  - monitor Hgb, transfuse as needed   - hold eliquis   - placed OG tube per GI and started ensure clear liquid (see note from RD 7/13/24)  - continue to f/u with GI and IR       #Diarrhea   - stopped maintenance fluids   - ordered c diff study (pending collection)    ID  #leukocytosis   - f/u BCx, UCx, Sputum Cx   - Start zosyn (7/11 - )   - resent GI panel (previously indeterminate norovirus)  - per ID- no intervention needed regarding the PCR results    ENDOCRINE  #adrenal insufficiency   -7/5 cortisol 17   - 7/11 cortisol 37.1     HEMATOLOGY/ONCOLOGY   - neuroendocrine tumor   - was on lanreotide then had POD in liver and started on peptide receptor radiotherapy (PRRT)- typically given every 8 weeks for 4 doses. He last received it 10/20/2023   - PET 5/28/24 shows new somatostatin avid osseous lesions; decreased intensely avid right supradiaphragmatic and upper abdominal nodes, suspicious for mets  - per heme/onc, can resume octreotide once infection r/o     SKINS:   - Lines/Tubes - PIV, ETT, cordis    Ethics:   - Full Code   - [facility] 7/12, spoke to spouse (Yamilet) over phone with palliative care team Assessment: 77 yo male with metastatic neuroendocrine pancreatic cancer (tx on hold) and PMH of CAD s/p PCI x3 with most recent stent 6/12/24 on Plavix and eliquis , chronic Afib on eliquis, orthostatic hypotension on midodrine, PAD , recent admission for dx cath on 6/24/24 presented from PCP's office for hypotension despite taking AM midodrine dose on 7/2, admitted to medicine for symptomatic hypotension and RAZIA. Per chart, on 7/8 PM, pt was noted to have acute onset of melena x5 and coffee ground emesis x2-3. RRT was called on 7/9 AM for hypotension, hgb dropped from 9.2 to 7.4 (admission baseline 10-11), FOBT +, pt was started on PPI IV and transfused 1 unit of pRBC. GI was consulted and pt is scheduled for EGD on 7/9 afternoon. MICU was consulted for uncontrolled bleeding during EGD. During EGD, pt became hypotensive and was given phenylephrine, had A-fib with RVR s/p amiodarone. Now s/p IR embolization, admitted to MICU for further moniring i.s.o hemorrhagic shock 2/2 GI bleed     Plan:     Neuro   # Baseline AOx3  - intubated   - on precedex   - on henny   - fentanyl prn for pain control (last dose 7/12/24)    CVS  # hemorrhagic shock   - RRT called 7/9 for hypotension   -  2/2 to Upper GI bleeds, urgently took to EGD, found bleeding ulcer that was not well controlled, underwent IR empiric embolization 7/9  - restarted midodrine   - on henny, maintain MAP > 65; took off vaso and precedex (7/13/24), he got tachy to 118. Added precedex back.   - AM cortisol 17 (7/5), AM cortisol 37.1 (7/11)   - transfuse as needed   - restarted plavix 7/13/24 for new stent (6/12/24) per GI       #CAD s/p PCI x3, most recent stent 6/12/24, NURIA to pLAD   - c/w statin   - restarted plavix 7/13/24 for new stent (6/12/24) per GI     #PAD  # A-fib on eliquis    - s/p successful DCCV 10/31   - hold sotalol given hypotension 2/2 GI bleeds   - hold eliquis     PULM   #Intubated for airway protection prior to EGD/IR embolization  - will hold off on SBT if any further procedures are planned   -AC 16/450/5/30    Renal/  #RAZIA   #ATN i.s.o hypotension  #metabolic acidosis   - f/u nephro   - trend BUN/Cr   - monitor Is and Os     #Upper GI Bleed   - s/p 5 units pRBC prior to MICU and then 4:2:2 on 7/12  - CT A/P 7/9 - Active bleeding in the third portion of the duodenum. Progression of liver metastases.  - EGD 7/9 showed esophagitis, One non-bleeding duodenal ulcer with a clean ulcer base (Arjun Class III). One oozing duodenal ulcer with spurting hemorrhage (Arjun Class Ia). Treatment not successful. Treated with bipolar cautery.  - s/p IR embolization on 7/9  - per GI recs, change PPI gtt to PPI IV BID  - monitor Hgb, transfuse as needed   - hold eliquis   - placed OG tube per GI and started ensure clear liquid (see note from RD 7/13/24)  - continue to f/u with GI and IR       #Diarrhea   - stopped maintenance fluids   - ordered c diff study (pending collection)    ID  #leukocytosis   - f/u BCx, UCx, Sputum Cx   - Start zosyn (7/11 - )   - resent GI panel (previously indeterminate norovirus)  - per ID- no intervention needed regarding the PCR results    ENDOCRINE  #adrenal insufficiency   -7/5 cortisol 17   - 7/11 cortisol 37.1     HEMATOLOGY/ONCOLOGY   - neuroendocrine tumor   - was on lanreotide then had POD in liver and started on peptide receptor radiotherapy (PRRT)- typically given every 8 weeks for 4 doses. He last received it 10/20/2023   - PET 5/28/24 shows new somatostatin avid osseous lesions; decreased intensely avid right supradiaphragmatic and upper abdominal nodes, suspicious for mets  - per heme/onc, can resume octreotide once infection r/o     SKINS:   - Lines/Tubes - PIV, ETT, cordis    Ethics:   - Full Code   - [facility] 7/12, spoke to spouse (Yamilet) over phone with palliative care team

## 2024-07-13 NOTE — PROGRESS NOTE ADULT - SUBJECTIVE AND OBJECTIVE BOX
INTERVAL HPI/OVERNIGHT EVENTS:    Patient S&E at bedside. Remains on 2 pressors. Underwent repeat EGD yesterday 7/12, lesions in duodenum with clots seen with some oozing but no clear targets for intervention. Purastat applied to areas of oozing, no active bleeding seen. Hgb stable at 13.     VITAL SIGNS:  T(F): 99.5 (07-13-24 @ 12:00)  HR: 123 (07-13-24 @ 15:15)  BP: --  RR: 25 (07-13-24 @ 15:15)  SpO2: 100% (07-13-24 @ 15:15)  Wt(kg): --    PHYSICAL EXAM:    Constitutional: NAD  Eyes: EOMI, sclera non-icteric  Neck: supple, no masses, no JVD  Respiratory: symmetric chest expansion   Cardiovascular: RRR, no M/R/G  Gastrointestinal: soft, NTND, no masses palpable  Extremities: no c/c/e      MEDICATIONS  (STANDING):  atorvastatin 80 milliGRAM(s) Oral at bedtime  chlorhexidine 0.12% Liquid 15 milliLiter(s) Oral Mucosa every 12 hours  chlorhexidine 2% Cloths 1 Application(s) Topical <User Schedule>  clopidogrel Tablet 75 milliGRAM(s) Enteral Tube daily  dexMEDEtomidine Infusion 0.3 MICROgram(s)/kG/Hr (7.4 mL/Hr) IV Continuous <Continuous>  folic acid Injectable 1 milliGRAM(s) IV Push daily  midodrine 10 milliGRAM(s) Oral every 8 hours  pantoprazole  Injectable 40 milliGRAM(s) IV Push every 12 hours  phenylephrine    Infusion 0.25 MICROgram(s)/kG/Min (9.25 mL/Hr) IV Continuous <Continuous>  piperacillin/tazobactam IVPB.. 3.375 Gram(s) IV Intermittent every 12 hours  potassium chloride   Solution 20 milliEquivalent(s) Oral once  potassium chloride  10 mEq/100 mL IVPB 10 milliEquivalent(s) IV Intermittent every 1 hour  propofol Infusion 10 MICROgram(s)/kG/Min (5.92 mL/Hr) IV Continuous <Continuous>  thiamine Injectable 100 milliGRAM(s) IV Push daily    MEDICATIONS  (PRN):  fentaNYL    Injectable 50 MICROGram(s) IV Push every 4 hours PRN Severe Pain (7 - 10)      Allergies    No Known Allergies    Intolerances        LABS:                        13.0   12.21 )-----------( 105      ( 13 Jul 2024 11:45 )             37.0     07-13    141  |  106  |  74<H>  ----------------------------<  158<H>  2.8<LL>   |  17<L>  |  1.65<H>    Ca    7.6<L>      13 Jul 2024 11:45  Phos  3.0     07-13  Mg     2.0     07-13    TPro  5.1<L>  /  Alb  2.4<L>  /  TBili  1.1  /  DBili  x   /  AST  222<H>  /  ALT  176<H>  /  AlkPhos  207<H>  07-13    PT/INR - ( 12 Jul 2024 21:12 )   PT: 13.4 sec;   INR: 1.29 ratio         PTT - ( 12 Jul 2024 21:12 )  PTT:33.5 sec  Urinalysis Basic - ( 13 Jul 2024 11:45 )    Color: x / Appearance: x / SG: x / pH: x  Gluc: 158 mg/dL / Ketone: x  / Bili: x / Urobili: x   Blood: x / Protein: x / Nitrite: x   Leuk Esterase: x / RBC: x / WBC x   Sq Epi: x / Non Sq Epi: x / Bacteria: x        RADIOLOGY & ADDITIONAL TESTS:  Studies reviewed.

## 2024-07-13 NOTE — PROGRESS NOTE ADULT - CRITICAL CARE ATTENDING COMMENT
Patient critically ill requiring frequent bedside visits.    Patient is a 78M extensive history including CAD s/p stent (most recent 6/12/24), Afib on AC, orthostatic hypotension on midodrine, and pancreatic neuroendocrine tumor who presents with hypotension due to acute GI bleed. The patient has had worsening shock state and multiorgan failure.    #Neuro - intubated and sedated.   - Wean sedation as able - currently on Prop.  #Cardiovascular - shock state due to GIB now improving - was previously on 3 pressors now on Ady and Vaso  - Maintain MAP > 65  - Previously on Levo but had RVR. Monitor HR  - NURIA currently off DAPT. Now that he has had EGD with stable Hb will d/w GI about restarting Plavix and monitoring.   --- On 6/12/24 patient had PCI with NURIA to large RPL and patent LAD stent  - Orthostatic hypotension - continue Midodrine. Was being considered for Droxidopa as outpatient.  - Cardiology evaluation noted  #Pulmonary - acute hypoxemic respiratory failure in setting of GI bleed  - Maintain O2 sat > 90%  - Continue mechanical ventilation and wean as able  #Gastro - massive GIB s/p EGD x 2 and empiric GDA embolization on 7/9  - 2nd EGD done 7/12 noted duodenal ulcers and clot but no active bleeding  - Monitor diarrhea - may need to check c. diff if it persists  - Continue PPI  - Transaminitis improving - shock liver  - Will d/w GI about whether or not OGT can be placed for feeds and medicine  #Nephro - acute renal failure due to ATN now slowly improving  - Monitor Cr and urine output. No longer requiring bicarb  #Infectious Disease - Continue antibiotics in setting of shock state  - Monitor temperature curve and follow-up cultures  - D/C [place]  - Has CAMERON Negron from IR for resuscitation - if continues to improve can hopefully remove this in next 24-48 hours  #Endo - monitor FS and adjust insulin as needed for goal FS < 180  #Hem/Onc - pancreatic neuroendocrine tumor  - [place] [facility] [place] per Onc seeing patient at [facility] there does appear to be progression of disease  #DVT proph - SCD  #GOC - Full Code    Prognosis guarded in setting of severe GI bleed and hemorrhagic shock with new NURIA 6/12/24 with need for antiplatelet therapy.

## 2024-07-13 NOTE — PROGRESS NOTE ADULT - SUBJECTIVE AND OBJECTIVE BOX
INTERVAL HPI/OVERNIGHT EVENTS: Weaned off levo, bicarb, fluids and gave potassium overnight. In the morning, we d/c vaso, and lidocaine patches. Nurse attempted a sedation holiday. Patient opened his eyes but had trouble following commands; propofol was dropped from 20 to 10.    SUBJECTIVE: Patient seen and examined at bedside.     ROS: Unable to assess given mental status     VITAL SIGNS:  ICU Vital Signs Last 24 Hrs  T(C): 37.6 (12 Jul 2024 04:00), Max: 38.3 (11 Jul 2024 20:00)  T(F): 99.7 (12 Jul 2024 04:00), Max: 100.9 (11 Jul 2024 20:00)  HR: 144 (12 Jul 2024 06:45) (77 - 165)  BP: --  BP(mean): --  ABP: 130/53 (12 Jul 2024 06:45) (64/36 - 174/68)  ABP(mean): 79 (12 Jul 2024 06:45) (40 - 106)  RR: 26 (12 Jul 2024 06:45) (25 - 38)  SpO2: 97% (12 Jul 2024 06:45) (93% - 100%)    O2 Parameters below as of 11 Jul 2024 20:00  Patient On (Oxygen Delivery Method): ventilator    O2 Concentration (%): 30      Mode: AC/ CMV (Assist Control/ Continuous Mandatory Ventilation), RR (machine): 16, TV (machine): 450, FiO2: 30, PEEP: 5, ITime: 1, MAP: 9, PIP: 18  Plateau pressure:   P/F ratio:     07-11 @ 07:01  -  07-12 @ 07:00  --------------------------------------------------------  IN: 8059.3 mL / OUT: 1305 mL / NET: 6754.3 mL      CAPILLARY BLOOD GLUCOSE          ECG: reviewed.    PHYSICAL EXAM:    GENERAL: NAD, lying in bed comfortably  HEAD:  Atraumatic, normocephalic  EYES: Eyes closed   NECK: Supple, trachea midline, no JVD  HEART: Regular rate and rhythm, no murmurs, rubs, or gallops  LUNGS: Unlabored respirations.    ABDOMEN: Soft, nontender  EXTREMITIES: No clubbing, cyanosis, or edema  NERVOUS SYSTEM:  Sedated  SKIN: No rashes or lesions    MEDICATIONS:  MEDICATIONS  (STANDING):  allopurinol 100 milliGRAM(s) Oral two times a day  ascorbic acid 500 milliGRAM(s) Oral daily  atorvastatin 80 milliGRAM(s) Oral at bedtime  chlorhexidine 0.12% Liquid 15 milliLiter(s) Oral Mucosa every 12 hours  chlorhexidine 2% Cloths 1 Application(s) Topical <User Schedule>  dexMEDEtomidine Infusion 0.5 MICROgram(s)/kG/Hr (12.3 mL/Hr) IV Continuous <Continuous>  folic acid 1 milliGRAM(s) Oral daily  lidocaine   4% Patch 1 Patch Transdermal daily  multivitamin 1 Tablet(s) Oral daily  norepinephrine Infusion 0.05 MICROgram(s)/kG/Min (4.63 mL/Hr) IV Continuous <Continuous>  pantoprazole  Injectable 40 milliGRAM(s) IV Push two times a day  phenylephrine    Infusion 0.25 MICROgram(s)/kG/Min (9.25 mL/Hr) IV Continuous <Continuous>  piperacillin/tazobactam IVPB.. 3.375 Gram(s) IV Intermittent every 12 hours  potassium chloride   Solution 40 milliEquivalent(s) Oral every 4 hours  propofol Infusion 10 MICROgram(s)/kG/Min (5.92 mL/Hr) IV Continuous <Continuous>  sodium bicarbonate  Infusion 0.304 mEq/kG/Hr (200 mL/Hr) IV Continuous <Continuous>  thiamine 100 milliGRAM(s) Oral daily  vasopressin Infusion 0.04 Unit(s)/Min (6 mL/Hr) IV Continuous <Continuous>    MEDICATIONS  (PRN):  fentaNYL    Injectable 50 MICROGram(s) IV Push every 4 hours PRN Severe Pain (7 - 10)  LORazepam   Injectable 2 milliGRAM(s) IV Push every 4 hours PRN Agitation  ondansetron Injectable 4 milliGRAM(s) IV Push every 8 hours PRN Nausea and/or Vomiting      ALLERGIES:  Allergies    No Known Allergies    Intolerances        LABS:                        9.2    12.08 )-----------( 127      ( 12 Jul 2024 06:30 )             28.0     07-12    142  |  107  |  96<H>  ----------------------------<  267<H>  3.2<L>   |  16<L>  |  2.87<H>    Ca    6.9<L>      12 Jul 2024 00:38  Phos  5.0     07-12  Mg     1.7     07-12    TPro  4.6<L>  /  Alb  2.1<L>  /  TBili  0.6  /  DBili  x   /  AST  1236<H>  /  ALT  544<H>  /  AlkPhos  241<H>  07-12    PT/INR - ( 12 Jul 2024 00:38 )   PT: 22.8 sec;   INR: 2.12 ratio         PTT - ( 12 Jul 2024 00:38 )  PTT:33.3 sec  Urinalysis Basic - ( 12 Jul 2024 00:38 )    Color: x / Appearance: x / SG: x / pH: x  Gluc: 267 mg/dL / Ketone: x  / Bili: x / Urobili: x   Blood: x / Protein: x / Nitrite: x   Leuk Esterase: x / RBC: x / WBC x   Sq Epi: x / Non Sq Epi: x / Bacteria: x      ABG:  pH, Arterial: 7.39 (07-12-24 @ 07:24)  pCO2, Arterial: 32 mmHg (07-12-24 @ 07:24)  pO2, Arterial: 104 mmHg (07-12-24 @ 07:24)  pH, Arterial: 7.40 (07-12-24 @ 05:19)  pCO2, Arterial: 31 mmHg (07-12-24 @ 05:19)  pO2, Arterial: 112 mmHg (07-12-24 @ 05:19)  pH, Arterial: 7.36 (07-12-24 @ 00:26)  pCO2, Arterial: 31 mmHg (07-12-24 @ 00:26)  pO2, Arterial: 128 mmHg (07-12-24 @ 00:26)  pH, Arterial: 7.31 (07-11-24 @ 15:58)  pCO2, Arterial: 32 mmHg (07-11-24 @ 15:58)  pO2, Arterial: 137 mmHg (07-11-24 @ 15:58)      vBG:    Micro:    Culture - Blood (collected 07-10-24 @ 20:35)  Source: .Blood Blood-Peripheral  Preliminary Report (07-12-24 @ 03:02):    No growth at 24 hours    Culture - Blood (collected 07-10-24 @ 20:30)  Source: .Blood Blood-Peripheral  Preliminary Report (07-12-24 @ 03:02):    No growth at 24 hours          RADIOLOGY & ADDITIONAL TESTS: Reviewed.

## 2024-07-13 NOTE — PROGRESS NOTE ADULT - SUBJECTIVE AND OBJECTIVE BOX
Interval Events:     Hospital Medications:  chlorhexidine 0.12% Liquid 15 milliLiter(s) Oral Mucosa every 12 hours  chlorhexidine 2% Cloths 1 Application(s) Topical <User Schedule>  fentaNYL    Injectable 50 MICROGram(s) IV Push every 4 hours PRN  folic acid Injectable 1 milliGRAM(s) IV Push daily  pantoprazole  Injectable 40 milliGRAM(s) IV Push every 12 hours  phenylephrine    Infusion 0.25 MICROgram(s)/kG/Min IV Continuous <Continuous>  piperacillin/tazobactam IVPB.. 3.375 Gram(s) IV Intermittent every 12 hours  potassium chloride  10 mEq/100 mL IVPB 10 milliEquivalent(s) IV Intermittent every 1 hour  propofol Infusion 10 MICROgram(s)/kG/Min IV Continuous <Continuous>  thiamine Injectable 100 milliGRAM(s) IV Push daily      ROS: As per HPI, otherwise 14-point ROS reviewed and negative.      PHYSICAL EXAM:   Vital Signs:  Vital Signs Last 24 Hrs  T(C): 37.5 (13 Jul 2024 12:00), Max: 38.4 (12 Jul 2024 18:15)  T(F): 99.5 (13 Jul 2024 12:00), Max: 101.1 (12 Jul 2024 18:15)  HR: 113 (13 Jul 2024 12:45) (65 - 120)  BP: --  BP(mean): --  RR: 24 (13 Jul 2024 12:45) (17 - 38)  SpO2: 100% (13 Jul 2024 12:45) (88% - 100%)    Parameters below as of 13 Jul 2024 08:00  Patient On (Oxygen Delivery Method): ventilator    O2 Concentration (%): 30  Daily     Daily       07-12-24 @ 07:01  -  07-13-24 @ 07:00  --------------------------------------------------------  IN: 6345.5 mL / OUT: 3460 mL / NET: 2885.5 mL    07-13-24 @ 07:01  -  07-13-24 @ 13:56  --------------------------------------------------------  IN: 711.5 mL / OUT: 420 mL / NET: 291.5 mL        GENERAL:  No acute distress  HEENT:  Normocephalic/atraumtic,  no scleral icterus  CHEST:  No accessory muscle use  HEART:  Regular rate and rhythm  ABDOMEN:  Soft, non-tender, non-distended, normoactive bowel sounds, no masses, no hepato-splenomegaly, no signs of chronic liver disease  EXTREMITIES:  No cyanosis, clubbing, or edema  SKIN:  No rash  NEURO:  Alert and oriented x 3, no asterixis, no tremor    LABS:                        13.0   12.21 )-----------( 105      ( 13 Jul 2024 11:45 )             37.0     Mean Cell Volume: 84.7 fl (07-13-24 @ 11:45)    07-13    141  |  106  |  74<H>  ----------------------------<  158<H>  2.8<LL>   |  17<L>  |  1.65<H>    Ca    7.6<L>      13 Jul 2024 11:45  Phos  3.0     07-13  Mg     2.0     07-13    TPro  5.1<L>  /  Alb  2.4<L>  /  TBili  1.1  /  DBili  x   /  AST  222<H>  /  ALT  176<H>  /  AlkPhos  207<H>  07-13    LIVER FUNCTIONS - ( 13 Jul 2024 11:45 )  Alb: 2.4 g/dL / Pro: 5.1 g/dL / ALK PHOS: 207 U/L / ALT: 176 U/L / AST: 222 U/L / GGT: x           PT/INR - ( 12 Jul 2024 21:12 )   PT: 13.4 sec;   INR: 1.29 ratio         PTT - ( 12 Jul 2024 21:12 )  PTT:33.5 sec  Urinalysis Basic - ( 13 Jul 2024 11:45 )    Color: x / Appearance: x / SG: x / pH: x  Gluc: 158 mg/dL / Ketone: x  / Bili: x / Urobili: x   Blood: x / Protein: x / Nitrite: x   Leuk Esterase: x / RBC: x / WBC x   Sq Epi: x / Non Sq Epi: x / Bacteria: x                              13.0   12.21 )-----------( 105      ( 13 Jul 2024 11:45 )             37.0                         12.5   12.31 )-----------( 108      ( 13 Jul 2024 05:56 )             36.3                         11.9   10.81 )-----------( 100      ( 12 Jul 2024 21:12 )             33.8                         11.8   8.77  )-----------( 121      ( 12 Jul 2024 12:57 )             34.3                         11.5   10.41 )-----------( 111      ( 12 Jul 2024 09:11 )             33.2       Imaging:           Interval Events:   No acute events  s/p EGD with organized clots around extensive duodenal ulceration without active bleeding. treated with purastat  Hgb stable without transfusion requirement in > 24 hours   Remains intubated on sedation and phenylephrine   Brown stool noted in bed    Hospital Medications:  chlorhexidine 0.12% Liquid 15 milliLiter(s) Oral Mucosa every 12 hours  chlorhexidine 2% Cloths 1 Application(s) Topical <User Schedule>  fentaNYL    Injectable 50 MICROGram(s) IV Push every 4 hours PRN  folic acid Injectable 1 milliGRAM(s) IV Push daily  pantoprazole  Injectable 40 milliGRAM(s) IV Push every 12 hours  phenylephrine    Infusion 0.25 MICROgram(s)/kG/Min IV Continuous <Continuous>  piperacillin/tazobactam IVPB.. 3.375 Gram(s) IV Intermittent every 12 hours  potassium chloride  10 mEq/100 mL IVPB 10 milliEquivalent(s) IV Intermittent every 1 hour  propofol Infusion 10 MICROgram(s)/kG/Min IV Continuous <Continuous>  thiamine Injectable 100 milliGRAM(s) IV Push daily    ROS: As per HPI, otherwise 14-point ROS reviewed and negative.    PHYSICAL EXAM:   Vital Signs:  Vital Signs Last 24 Hrs  T(C): 37.5 (13 Jul 2024 12:00), Max: 38.4 (12 Jul 2024 18:15)  T(F): 99.5 (13 Jul 2024 12:00), Max: 101.1 (12 Jul 2024 18:15)  HR: 113 (13 Jul 2024 12:45) (65 - 120)  BP: --  BP(mean): --  RR: 24 (13 Jul 2024 12:45) (17 - 38)  SpO2: 100% (13 Jul 2024 12:45) (88% - 100%)    Parameters below as of 13 Jul 2024 08:00  Patient On (Oxygen Delivery Method): ventilator    O2 Concentration (%): 30  Daily     Daily       07-12-24 @ 07:01  -  07-13-24 @ 07:00  --------------------------------------------------------  IN: 6345.5 mL / OUT: 3460 mL / NET: 2885.5 mL    07-13-24 @ 07:01  -  07-13-24 @ 13:56  --------------------------------------------------------  IN: 711.5 mL / OUT: 420 mL / NET: 291.5 mL      GENERAL:  intubated, sedated, somewhat responsive  CHEST:  MV via ETT  HEART:  on vasopressors  ABDOMEN:  Soft, non-tender  SKIN: cool to touch  NEURO:  sedated    LABS:                        13.0   12.21 )-----------( 105      ( 13 Jul 2024 11:45 )             37.0     Mean Cell Volume: 84.7 fl (07-13-24 @ 11:45)    07-13    141  |  106  |  74<H>  ----------------------------<  158<H>  2.8<LL>   |  17<L>  |  1.65<H>    Ca    7.6<L>      13 Jul 2024 11:45  Phos  3.0     07-13  Mg     2.0     07-13    TPro  5.1<L>  /  Alb  2.4<L>  /  TBili  1.1  /  DBili  x   /  AST  222<H>  /  ALT  176<H>  /  AlkPhos  207<H>  07-13    LIVER FUNCTIONS - ( 13 Jul 2024 11:45 )  Alb: 2.4 g/dL / Pro: 5.1 g/dL / ALK PHOS: 207 U/L / ALT: 176 U/L / AST: 222 U/L / GGT: x           PT/INR - ( 12 Jul 2024 21:12 )   PT: 13.4 sec;   INR: 1.29 ratio         PTT - ( 12 Jul 2024 21:12 )  PTT:33.5 sec  Urinalysis Basic - ( 13 Jul 2024 11:45 )    Color: x / Appearance: x / SG: x / pH: x  Gluc: 158 mg/dL / Ketone: x  / Bili: x / Urobili: x   Blood: x / Protein: x / Nitrite: x   Leuk Esterase: x / RBC: x / WBC x   Sq Epi: x / Non Sq Epi: x / Bacteria: x                              13.0   12.21 )-----------( 105      ( 13 Jul 2024 11:45 )             37.0                         12.5   12.31 )-----------( 108      ( 13 Jul 2024 05:56 )             36.3                         11.9   10.81 )-----------( 100      ( 12 Jul 2024 21:12 )             33.8                         11.8   8.77  )-----------( 121      ( 12 Jul 2024 12:57 )             34.3                         11.5   10.41 )-----------( 111      ( 12 Jul 2024 09:11 )             33.2       < from: Upper Endoscopy (07.12.24 @ 13:34) >  Impression:          - LA Grade D esophagitis.                       - Old blood in the cardia.                       - Large organized clots inthe second portion of the duodenum (forest class                        IIb), removed with reyez net and extrensively irrigated                       - Multiple non-bleeding duodenal ulcers, some with pigmented spots, were                        foundthroughout the duodenum and underneath organized clots. Oozing but no                        active bleeding noted. Purastat applied.                       - No specimens collected.    < end of copied text >

## 2024-07-14 NOTE — PROGRESS NOTE ADULT - ASSESSMENT
Assessment: 77 yo male with metastatic neuroendocrine pancreatic cancer (tx on hold) and PMH of CAD s/p PCI x3 with most recent stent 6/12/24 on Plavix and eliquis , chronic Afib on eliquis, orthostatic hypotension on midodrine, PAD , recent admission for dx cath on 6/24/24 presented from PCP's office for hypotension despite taking AM midodrine dose on 7/2, admitted to medicine for symptomatic hypotension and RAZIA. Per chart, on 7/8 PM, pt was noted to have acute onset of melena x5 and coffee ground emesis x2-3. RRT was called on 7/9 AM for hypotension, hgb dropped from 9.2 to 7.4 (admission baseline 10-11), FOBT +, pt was started on PPI IV and transfused 1 unit of pRBC. GI was consulted and pt is scheduled for EGD on 7/9 afternoon. MICU was consulted for uncontrolled bleeding during EGD. During EGD, pt became hypotensive and was given phenylephrine, had A-fib with RVR s/p amiodarone. Now s/p IR embolization, admitted to MICU for further moniring i.s.o hemorrhagic shock 2/2 GI bleed     Plan:     Neuro   # Baseline AOx3  - intubated   - on precedex   - on henny   - fentanyl prn for pain control (last dose 7/12/24)    CVS  # hemorrhagic shock   - RRT called 7/9 for hypotension   -  2/2 to Upper GI bleeds, urgently took to EGD, found bleeding ulcer that was not well controlled, underwent IR empiric embolization 7/9  - restarted midodrine   - on henny, maintain MAP > 65; took off vaso and precedex (7/13/24), he got tachy to 118. Added precedex back.   - AM cortisol 17 (7/5), AM cortisol 37.1 (7/11)   - transfuse as needed   - restarted plavix 7/13/24 for new stent (6/12/24) per GI       #CAD s/p PCI x3, most recent stent 6/12/24, NURIA to pLAD   - c/w statin   - restarted plavix 7/13/24 for new stent (6/12/24) per GI     #PAD  # A-fib on eliquis    - s/p successful DCCV 10/31   - hold sotalol given hypotension 2/2 GI bleeds   - hold eliquis     PULM   #Intubated for airway protection prior to EGD/IR embolization  - will hold off on SBT if any further procedures are planned   -AC 16/450/5/30    Renal/  #RAZIA   #ATN i.s.o hypotension  #metabolic acidosis   - f/u nephro   - trend BUN/Cr   - monitor Is and Os     #Upper GI Bleed   - s/p 5 units pRBC prior to MICU and then 4:2:2 on 7/12  - CT A/P 7/9 - Active bleeding in the third portion of the duodenum. Progression of liver metastases.  - EGD 7/9 showed esophagitis, One non-bleeding duodenal ulcer with a clean ulcer base (Arjun Class III). One oozing duodenal ulcer with spurting hemorrhage (Arjun Class Ia). Treatment not successful. Treated with bipolar cautery.  - s/p IR embolization on 7/9  - per GI recs, change PPI gtt to PPI IV BID  - monitor Hgb, transfuse as needed   - hold eliquis   - placed OG tube per GI and started ensure clear liquid (see note from RD 7/13/24)  - continue to f/u with GI and IR       #Diarrhea   - stopped maintenance fluids   - ordered c diff study (pending collection)    ID  #leukocytosis   - f/u BCx, UCx, Sputum Cx   - Start zosyn (7/11 - )   - resent GI panel (previously indeterminate norovirus)  - per ID- no intervention needed regarding the PCR results    ENDOCRINE  #adrenal insufficiency   -7/5 cortisol 17   - 7/11 cortisol 37.1     HEMATOLOGY/ONCOLOGY   - neuroendocrine tumor   - was on lanreotide then had POD in liver and started on peptide receptor radiotherapy (PRRT)- typically given every 8 weeks for 4 doses. He last received it 10/20/2023   - PET 5/28/24 shows new somatostatin avid osseous lesions; decreased intensely avid right supradiaphragmatic and upper abdominal nodes, suspicious for mets  - per heme/onc, can resume octreotide once infection r/o     SKINS:   - Lines/Tubes - PIV, ETT, cordis    Ethics:   - Full Code   - [facility] 7/12, spoke to spouse (Yamilet) over phone with palliative care team Assessment: 79 yo male with metastatic neuroendocrine pancreatic cancer (tx on hold) and PMH of CAD s/p PCI x3 with most recent stent 6/12/24 on Plavix and eliquis , chronic Afib on eliquis, orthostatic hypotension on midodrine, PAD , recent admission for dx cath on 6/24/24 presented from PCP's office for hypotension despite taking AM midodrine dose on 7/2, admitted to medicine for symptomatic hypotension and RAZIA. Per chart, on 7/8 PM, pt was noted to have acute onset of melena x5 and coffee ground emesis x2-3. RRT was called on 7/9 AM for hypotension, hgb dropped from 9.2 to 7.4 (admission baseline 10-11), FOBT +, pt was started on PPI IV and transfused 1 unit of pRBC. GI was consulted and pt is scheduled for EGD on 7/9 afternoon. MICU was consulted for uncontrolled bleeding during EGD. During EGD, pt became hypotensive and was given phenylephrine, had A-fib with RVR s/p amiodarone. Now s/p IR embolization, admitted to MICU for further moniring i.s.o hemorrhagic shock 2/2 GI bleed     Plan:     Neuro   # Baseline AOx3  - intubated   - on precedex and propofol, wean off propofol as tolerated  - on henny and vasopressin  - fentanyl prn for pain control (last dose 7/12/24)    CVS  # hemorrhagic shock   - RRT called 7/9 for hypotension   -  2/2 to Upper GI bleeds, urgently took to EGD, found bleeding ulcer that was not well controlled, underwent IR empiric embolization 7/9  - increase midodrine to 20 TID  - start droxidopa 100 q8   - on henny, maintain MAP > 65; took off vaso and precedex (7/13/24), he got tachy to 118. Added precedex back. vasopressin added 7/14  - per spouse, pt's baseline BP is a little bit over 100  - AM cortisol 17 (7/5), AM cortisol 37.1 (7/11)  - transfuse as needed (s/p 5 pRBC prior to MICU and now s/p 4 pRBC, 2 FFP, 2 platelets in MICU)  - restarted plavix 7/13/24 for new stent (6/12/24) per GI       #CAD s/p PCI x3, most recent stent 6/12/24, NURIA to pLAD   - c/w statin   - restarted plavix 7/13/24 for new stent (6/12/24) per GI     #PAD  # A-fib on eliquis    - s/p successful DCCV 10/31   - hold sotalol given hypotension 2/2 GI bleeds   - hold eliquis     PULM   #Intubated for airway protection prior to EGD/IR embolization  - will hold off on SBT if any further procedures are planned   -AC 16/450/5/30    Renal/  #RAZIA   #ATN i.s.o hypotension  #metabolic acidosis   - f/u nephro   - trend BUN/Cr   - monitor Is and Os     #Upper GI Bleed   - s/p 5 units pRBC prior to MICU and then 4:2:2 on 7/12  - CT A/P 7/9 - Active bleeding in the third portion of the duodenum. Progression of liver metastases.  - EGD 7/9 showed esophagitis, One non-bleeding duodenal ulcer with a clean ulcer base (Arjun Class III). One oozing duodenal ulcer with spurting hemorrhage (Arjun Class Ia). Treatment not successful. Treated with bipolar cautery.  - s/p IR embolization on 7/9  - per GI recs, change PPI gtt to PPI IV BID  - monitor Hgb, transfuse as needed   - hold eliquis   - placed OG tube per GI and started ensure clear liquid (see note from RD 7/13/24)  - continue to f/u with GI and IR       #Diarrhea   - stopped maintenance fluids   - positive for c. diff (7/14)  - started vancomycin (7/14 -)     ID  #leukocytosis   -  BCx 7/10 - NGTD  - Sputum Cx from ETT 7/13 showed numerous gram neg krishna  - Start zosyn (7/11 - )   - resent GI panel (previously indeterminate norovirus)  - per ID- no intervention needed regarding the PCR results  - started vancomycin for c. diff (7/14- )    ENDOCRINE  #adrenal insufficiency   -7/5 cortisol 17   - 7/11 cortisol 37.1     HEMATOLOGY/ONCOLOGY   - neuroendocrine tumor   - was on lanreotide then had POD in liver and started on peptide receptor radiotherapy (PRRT)- typically given every 8 weeks for 4 doses. He last received it 10/20/2023   - PET 5/28/24 shows new somatostatin avid osseous lesions; decreased intensely avid right supradiaphragmatic and upper abdominal nodes, suspicious for mets  - per heme/onc, can resume octreotide once infection r/o     SKINS:   - Lines/Tubes - PIV, ETT, cordis (plan to replace with central line 7/14)    Ethics:   - Full Code   - [facility] 7/12, spoke to spouse (Yamilet) over phone with palliative care team

## 2024-07-14 NOTE — PROGRESS NOTE ADULT - SUBJECTIVE AND OBJECTIVE BOX
DATE OF SERVICE: 07-14-24 @ 09:02    Patient is a 78y old  Male who presents with a chief complaint of hypotension (14 Jul 2024 06:56)      INTERVAL HISTORY:  no acute distress   REVIEW OF SYSTEMS:  CONSTITUTIONAL: No weakness  EYES/ENT: No visual changes;  No throat pain   NECK: No pain or stiffness  RESPIRATORY: No cough, wheezing; No shortness of breath  CARDIOVASCULAR: No chest pain or palpitations  GASTROINTESTINAL: No abdominal  pain. No nausea, vomiting, or hematemesis  GENITOURINARY: No dysuria, frequency or hematuria  NEUROLOGICAL: No stroke like symptoms  SKIN: No rashes    	  MEDICATIONS:  midodrine 10 milliGRAM(s) Oral every 8 hours  phenylephrine    Infusion 0.25 MICROgram(s)/kG/Min IV Continuous <Continuous>        PHYSICAL EXAM:  T(C): 36.7 (07-14-24 @ 08:00), Max: 38.5 (07-13-24 @ 21:00)  HR: 112 (07-14-24 @ 08:45) (101 - 128)  BP: --  RR: 25 (07-14-24 @ 08:45) (18 - 38)  SpO2: 100% (07-14-24 @ 08:45) (99% - 100%)  Wt(kg): --  I&O's Summary    13 Jul 2024 07:01  -  14 Jul 2024 07:00  --------------------------------------------------------  IN: 4098.2 mL / OUT: 1830 mL / NET: 2268.2 mL    14 Jul 2024 07:01  -  14 Jul 2024 09:02  --------------------------------------------------------  IN: 386.2 mL / OUT: 0 mL / NET: 386.2 mL          Appearance: In no distress	  HEENT:    PERRL, EOMI	  Cardiovascular:  S1 S2, No JVD  Respiratory: Lungs clear to auscultation	  Gastrointestinal:  Soft, Non-tender, + BS	  Vascularature:  No edema of LE  Psychiatric: Appropriate affect   Neuro: no acute focal deficits                               14.4   14.65 )-----------( 112      ( 14 Jul 2024 00:26 )             41.8     07-14    142  |  110<H>  |  66<H>  ----------------------------<  125<H>  3.2<L>   |  16<L>  |  1.53<H>    Ca    7.8<L>      14 Jul 2024 00:27  Phos  3.3     07-14  Mg     1.9     07-14    TPro  5.2<L>  /  Alb  2.2<L>  /  TBili  1.4<H>  /  DBili  x   /  AST  145<H>  /  ALT  138<H>  /  AlkPhos  231<H>  07-14        Labs personally reviewed      ASSESSMENT/PLAN: 	  78-year-old male multiple medical problems history of hepatocellular carcinoma status post radiation therapy, AF s/p DCCV on Eliquis who was found to be hypotensive following NST. Patient has reported general weakness, fatigue, lightheadedness since being discharged from hospital. Reports mild chest discomfort in midsternal region. Reports dyspnea with minimal exertion. Also reports significant lack of appetite and poor PO intake. No dark or bloody stool, nausea or vomiting.       Problem/Plan - 1:  ·  Problem: Hypotension  ·  Plan: Continue with pressors in MICU  - Patient presents with hypotension and also RAZIA. Has known orthostatic hypotension.   - Patient and wife report decreased PO intake likely 2/2 malignancy.  - GIB 7/9, s/p 4 units prbc, IR for mesenteric embolization, now in MICU on pressors     Problem/Plan - 2:  ·  Problem: CAD.   ·  Plan: Recent PCI to pLAD in June 2023  - ECG non-ischemic  - Given recurrent bleed, will have to DC Plavix for now. Plan to resume as soon as possible.      Problem/Plan - 3:  ·  Problem: Atrial Fibrillation  - s/p succesful DCCV 10/31  - Hold Eliquis 5mg BID given GIB  - C/w of Sotalol 40mg PO daily (qTC wnl)    Problem/Plan - 4:  ·  Problem: H/O Pericarditis.   ·  Plan: Chest pain now improved since last admission.  -  d/c colchicine 0.6mg PO daily    Problem/Plan - 4:  ·  Problem: Preop Risk Stratification.   ·  Plan: Patient is moderate risk for low risk, urgent endoscopy.  No cardiac contraindication to proceeed.  Ideally should continue Plavix/Cangleror given recent Stent if able.     Problem/Plan - 5:  ·  Problem: CKD  ·  Plan: RAZIA in setting of hypotension, anemia -> GIB, hemorrhagic shock leading to oliguric ATN received CT IV contrast, and emergently went to IR for mesenteric embolization 7/9  now creatinine is improving, non oliguric , decreasing pressor requirements.   acidosis, continue sodium bicarb drip  strict I/O  monitor creatinine closely  nephro on board     Problem/Plan - 5:  ·  Problem: DVT prophylactic   ·  Plan: SCD's , OOB-CHAIR   - resume Eliquis when hemodynamically stable              ESTEFANI Velez,  [facility]  Cardiovascular Medicine  [address] [facility], [address]  Available through call or text on [employer]  Office: [phone]

## 2024-07-14 NOTE — PROGRESS NOTE ADULT - SUBJECTIVE AND OBJECTIVE BOX
patient seen and examined.   intubated    PAST HISTORY  --------------------------------------------------------------------------------  No significant changes to PMH, PSH, FHx, SHx, unless otherwise noted    ALLERGIES & MEDICATIONS  --------------------------------------------------------------------------------  Allergies    No Known Allergies    Intolerances      Standing Inpatient Medications  allopurinol 100 milliGRAM(s) Oral two times a day  ascorbic acid 500 milliGRAM(s) Oral daily  atorvastatin 80 milliGRAM(s) Oral at bedtime  chlorhexidine 0.12% Liquid 15 milliLiter(s) Oral Mucosa every 12 hours  chlorhexidine 2% Cloths 1 Application(s) Topical <User Schedule>  dexMEDEtomidine Infusion 0.5 MICROgram(s)/kG/Hr IV Continuous <Continuous>  folic acid 1 milliGRAM(s) Oral daily  lidocaine   4% Patch 1 Patch Transdermal daily  multivitamin 1 Tablet(s) Oral daily  norepinephrine Infusion 0.05 MICROgram(s)/kG/Min IV Continuous <Continuous>  pantoprazole Infusion 8 mG/Hr IV Continuous <Continuous>  phenylephrine    Infusion 0.25 MICROgram(s)/kG/Min IV Continuous <Continuous>  piperacillin/tazobactam IVPB.. 3.375 Gram(s) IV Intermittent every 12 hours  potassium chloride  10 mEq/100 mL IVPB 10 milliEquivalent(s) IV Intermittent every 1 hour  propofol Infusion 10 MICROgram(s)/kG/Min IV Continuous <Continuous>  sodium bicarbonate  Infusion 0.152 mEq/kG/Hr IV Continuous <Continuous>  thiamine 100 milliGRAM(s) Oral daily  vasopressin Infusion 0.04 Unit(s)/Min IV Continuous <Continuous>    PRN Inpatient Medications  fentaNYL    Injectable 50 MICROGram(s) IV Push every 4 hours PRN  LORazepam   Injectable 2 milliGRAM(s) IV Push every 4 hours PRN  ondansetron Injectable 4 milliGRAM(s) IV Push every 8 hours PRN      REVIEW OF SYSTEMS  --------------------------------------------------------------------------------    patient is unable to give ROS                            13.9   13.97 )-----------( 91       ( 14 Jul 2024 12:57 )             41.1       CBC Full  -  ( 14 Jul 2024 12:57 )  WBC Count : 13.97 K/uL  RBC Count : 4.67 M/uL  Hemoglobin : 13.9 g/dL  Hematocrit : 41.1 %  Platelet Count - Automated : 91 K/uL  Mean Cell Volume : 88.0 fl  Mean Cell Hemoglobin : 29.8 pg  Mean Cell Hemoglobin Concentration : 33.8 gm/dL  Auto Neutrophil # : 11.98 K/uL  Auto Lymphocyte # : 0.77 K/uL  Auto Monocyte # : 0.75 K/uL  Auto Eosinophil # : 0.04 K/uL  Auto Basophil # : 0.05 K/uL  Auto Neutrophil % : 85.7 %  Auto Lymphocyte % : 5.5 %  Auto Monocyte % : 5.4 %  Auto Eosinophil % : 0.3 %  Auto Basophil % : 0.4 %      07-14    141  |  110<H>  |  67<H>  ----------------------------<  172<H>  4.9   |  14<L>  |  1.54<H>    Ca    7.5<L>      14 Jul 2024 12:57  Phos  4.6     07-14  Mg     2.0     07-14    TPro  5.0<L>  /  Alb  2.0<L>  /  TBili  1.2  /  DBili  x   /  AST  124<H>  /  ALT  117<H>  /  AlkPhos  227<H>  07-14      CAPILLARY BLOOD GLUCOSE          Vital Signs Last 24 Hrs  T(C): 38.8 (14 Jul 2024 17:30), Max: 38.8 (14 Jul 2024 17:30)  T(F): 101.8 (14 Jul 2024 17:30), Max: 101.8 (14 Jul 2024 17:30)  HR: 99 (14 Jul 2024 17:30) (96 - 128)  BP: --  BP(mean): --  RR: 23 (14 Jul 2024 16:00) (18 - 36)  SpO2: 100% (14 Jul 2024 17:30) (98% - 100%)    Parameters below as of 14 Jul 2024 08:00  Patient On (Oxygen Delivery Method): ventilator    O2 Concentration (%): 30    Urinalysis Basic - ( 14 Jul 2024 12:57 )    Color: x / Appearance: x / SG: x / pH: x  Gluc: 172 mg/dL / Ketone: x  / Bili: x / Urobili: x   Blood: x / Protein: x / Nitrite: x   Leuk Esterase: x / RBC: x / WBC x   Sq Epi: x / Non Sq Epi: x / Bacteria: x        PT/INR - ( 14 Jul 2024 01:53 )   PT: 12.6 sec;   INR: 1.15 ratio         PTT - ( 14 Jul 2024 01:53 )  PTT:33.8 sec        14.94 )-----------( 114      ( 13 Jul 2024 17:51 )             39.7       CBC Full  -  ( 13 Jul 2024 17:51 )  WBC Count : 14.94 K/uL  RBC Count : 4.61 M/uL  Hemoglobin : 13.6 g/dL  Hematocrit : 39.7 %  Platelet Count - Automated : 114 K/uL  Mean Cell Volume : 86.1 fl  Mean Cell Hemoglobin : 29.5 pg  Mean Cell Hemoglobin Concentration : 34.3 gm/dL  Auto Neutrophil # : 13.01 K/uL  Auto Lymphocyte # : 0.75 K/uL  Auto Monocyte # : 0.82 K/uL  Auto Eosinophil # : 0.10 K/uL  Auto Basophil # : 0.04 K/uL  Auto Neutrophil % : 87.0 %  Auto Lymphocyte % : 5.0 %  Auto Monocyte % : 5.5 %  Auto Eosinophil % : 0.7 %  Auto Basophil % : 0.3 %      07-13    143  |  111<H>  |  70<H>  ----------------------------<  109<H>  3.3<L>   |  17<L>  |  1.54<H>    Ca    7.7<L>      13 Jul 2024 17:51  Phos  3.0     07-13  Mg     2.0     07-13    TPro  5.1<L>  /  Alb  2.2<L>  /  TBili  1.2  /  DBili  x   /  AST  182<H>  /  ALT  155<H>  /  AlkPhos  219<H>  07-13      CAPILLARY BLOOD GLUCOSE          Vital Signs Last 24 Hrs  T(C): 38.4 (13 Jul 2024 20:00), Max: 38.4 (13 Jul 2024 20:00)  T(F): 101.1 (13 Jul 2024 20:00), Max: 101.1 (13 Jul 2024 20:00)  HR: 109 (13 Jul 2024 21:02) (65 - 125)  BP: --  BP(mean): --  RR: 26 (13 Jul 2024 20:45) (19 - 38)  SpO2: 100% (13 Jul 2024 21:02) (99% - 100%)    Parameters below as of 13 Jul 2024 20:00  Patient On (Oxygen Delivery Method): ventilator    O2 Concentration (%): 30    Urinalysis Basic - ( 13 Jul 2024 17:51 )    Color: x / Appearance: x / SG: x / pH: x  Gluc: 109 mg/dL / Ketone: x  / Bili: x / Urobili: x   Blood: x / Protein: x / Nitrite: x   Leuk Esterase: x / RBC: x / WBC x   Sq Epi: x / Non Sq Epi: x / Bacteria: x        PT/INR - ( 12 Jul 2024 21:12 )   PT: 13.4 sec;   INR: 1.29 ratio         PTT - ( 12 Jul 2024 21:12 )  PTT:33.5 sec    Physical Exam:  	  	Gen: intubated, sedated  	Pulm: decrease bs  no rales or ronchi or wheezing  	CV: No JVD. RRR, S1S2; no rub  	Abd: no BS, softly distended  	: tavarez  	UE: Warm, no cyanosis  no clubbing,  no edema  	LE: Warm, no cyanosis  no clubbing, no edema    LABS/STUDIES  --------------------------------------------------------------------------------              11.8   8.77  >-----------<  121      [07-12-24 @ 12:57]              34.3     140  |  104  |  96  ----------------------------<  273      [07-12-24 @ 09:11]  3.2   |  17  |  2.20        Ca     7.4     [07-12-24 @ 09:11]      Mg     2.0     [07-12-24 @ 09:11]      Phos  4.0     [07-12-24 @ 09:11]    TPro  4.9  /  Alb  2.5  /  TBili  1.1  /  DBili  x   /  AST  687  /  ALT  345  /  AlkPhos  213  [07-12-24 @ 09:11]    PT/INR: PT 14.0 , INR 1.34       [07-12-24 @ 12:57]  PTT: 32.2       [07-12-24 @ 12:57]    CK 1668      [07-12-24 @ 09:11]    Creatinine Trend:  SCr 2.20 [07-12 @ 09:11]  SCr 2.87 [07-12 @ 00:38]  SCr 3.21 [07-11 @ 16:09]  SCr 3.38 [07-11 @ 07:58]  SCr 3.12 [07-10 @ 23:20]              TSH 2.47      [07-04-24 @ 12:31]

## 2024-07-14 NOTE — PROGRESS NOTE ADULT - SUBJECTIVE AND OBJECTIVE BOX
INTERVAL HPI/OVERNIGHT EVENTS:    SUBJECTIVE: Patient seen and examined at bedside.     ROS: All negative except as listed above.    VITAL SIGNS:  ICU Vital Signs Last 24 Hrs  T(C): 37.5 (14 Jul 2024 04:00), Max: 38.5 (13 Jul 2024 21:00)  T(F): 99.5 (14 Jul 2024 04:00), Max: 101.3 (13 Jul 2024 21:00)  HR: 119 (14 Jul 2024 06:15) (101 - 128)  BP: --  BP(mean): --  ABP: 81/56 (14 Jul 2024 06:15) (80/42 - 129/75)  ABP(mean): 66 (14 Jul 2024 06:15) (52 - 94)  RR: 23 (14 Jul 2024 06:15) (18 - 38)  SpO2: 100% (14 Jul 2024 06:15) (99% - 100%)    O2 Parameters below as of 13 Jul 2024 20:00  Patient On (Oxygen Delivery Method): ventilator    O2 Concentration (%): 30      Mode: AC/ CMV (Assist Control/ Continuous Mandatory Ventilation), RR (machine): 16, TV (machine): 450, FiO2: 30, PEEP: 5, ITime: 0.58, MAP: 11, PIP: 23  Plateau pressure:   P/F ratio:     07-12 @ 07:01  -  07-13 @ 07:00  --------------------------------------------------------  IN: 6345.5 mL / OUT: 3460 mL / NET: 2885.5 mL    07-13 @ 07:01  -  07-14 @ 06:57  --------------------------------------------------------  IN: 3358.4 mL / OUT: 1230 mL / NET: 2128.4 mL      CAPILLARY BLOOD GLUCOSE          ECG: reviewed.    PHYSICAL EXAM:    GENERAL: NAD, lying in bed comfortably  HEAD:  Atraumatic, normocephalic  EYES: EOMI, PERRLA, conjunctiva and sclera clear  NECK: Supple, trachea midline, no JVD  HEART: Regular rate and rhythm, no murmurs, rubs, or gallops  LUNGS: Unlabored respirations.  Clear to auscultation bilaterally, no crackles, wheezing, or rhonchi  ABDOMEN: Soft, nontender, nondistended, +BS  EXTREMITIES: 2+ peripheral pulses bilaterally, cap refill<2 secs. No clubbing, cyanosis, or edema  NERVOUS SYSTEM:  A&Ox3, following commands, moving all extremities, no focal deficits   SKIN: No rashes or lesions    MEDICATIONS:  MEDICATIONS  (STANDING):  atorvastatin 80 milliGRAM(s) Oral at bedtime  chlorhexidine 0.12% Liquid 15 milliLiter(s) Oral Mucosa every 12 hours  chlorhexidine 2% Cloths 1 Application(s) Topical <User Schedule>  clopidogrel Tablet 75 milliGRAM(s) Enteral Tube daily  dexMEDEtomidine Infusion 0.3 MICROgram(s)/kG/Hr (7.4 mL/Hr) IV Continuous <Continuous>  folic acid Injectable 1 milliGRAM(s) IV Push daily  midodrine 10 milliGRAM(s) Oral every 8 hours  pantoprazole  Injectable 40 milliGRAM(s) IV Push every 12 hours  phenylephrine    Infusion 0.25 MICROgram(s)/kG/Min (9.25 mL/Hr) IV Continuous <Continuous>  piperacillin/tazobactam IVPB.. 3.375 Gram(s) IV Intermittent every 12 hours  potassium chloride   Solution 40 milliEquivalent(s) Oral every 4 hours  propofol Infusion 10 MICROgram(s)/kG/Min (5.92 mL/Hr) IV Continuous <Continuous>  thiamine Injectable 100 milliGRAM(s) IV Push daily  vancomycin    Solution 125 milliGRAM(s) Oral every 6 hours    MEDICATIONS  (PRN):  acetaminophen     Tablet .. 650 milliGRAM(s) Oral every 6 hours PRN Temp greater or equal to 38C (100.4F)  fentaNYL    Injectable 50 MICROGram(s) IV Push every 4 hours PRN Severe Pain (7 - 10)      ALLERGIES:  Allergies    No Known Allergies    Intolerances        LABS:                        14.4   14.65 )-----------( 112      ( 14 Jul 2024 00:26 )             41.8     07-14    142  |  110<H>  |  66<H>  ----------------------------<  125<H>  3.2<L>   |  16<L>  |  1.53<H>    Ca    7.8<L>      14 Jul 2024 00:27  Phos  3.3     07-14  Mg     1.9     07-14    TPro  5.2<L>  /  Alb  2.2<L>  /  TBili  1.4<H>  /  DBili  x   /  AST  145<H>  /  ALT  138<H>  /  AlkPhos  231<H>  07-14    PT/INR - ( 14 Jul 2024 01:53 )   PT: 12.6 sec;   INR: 1.15 ratio         PTT - ( 14 Jul 2024 01:53 )  PTT:33.8 sec  Urinalysis Basic - ( 14 Jul 2024 00:27 )    Color: x / Appearance: x / SG: x / pH: x  Gluc: 125 mg/dL / Ketone: x  / Bili: x / Urobili: x   Blood: x / Protein: x / Nitrite: x   Leuk Esterase: x / RBC: x / WBC x   Sq Epi: x / Non Sq Epi: x / Bacteria: x      ABG:  pH, Arterial: 7.44 (07-14-24 @ 00:15)  pCO2, Arterial: 29 mmHg (07-14-24 @ 00:15)  pO2, Arterial: 150 mmHg (07-14-24 @ 00:15)      vBG:    Micro:    Culture - Blood (collected 07-10-24 @ 20:35)  Source: .Blood Blood-Peripheral  Preliminary Report (07-14-24 @ 03:01):    No growth at 72 Hours    Culture - Blood (collected 07-10-24 @ 20:30)  Source: .Blood Blood-Peripheral  Preliminary Report (07-14-24 @ 03:01):    No growth at 72 Hours        Culture - Sputum (collected 07-13-24 @ 11:23)  Source: ET Tube ET Tube  Gram Stain (07-13-24 @ 23:36):    Moderate polymorphonuclear leukocytes per low power field    No Squamous epithelial cells per low power field    Numerous Gram Negative Rods per oil power field    Few Gram Positive Rods per oil power field        RADIOLOGY & ADDITIONAL TESTS: Reviewed.   INTERVAL HPI/OVERNIGHT EVENTS: Pt was on cangrelor infusion yesterday, switched to Plavix today. Received metoprolol 2.5 mg x1 for tachycardia in AM. Cachorro WSANN diff +, started on vancomycin.     SUBJECTIVE: Patient seen and examined at bedside.     ROS: Unable to assess given pt's condition.     VITAL SIGNS:  ICU Vital Signs Last 24 Hrs  T(C): 37.5 (14 Jul 2024 04:00), Max: 38.5 (13 Jul 2024 21:00)  T(F): 99.5 (14 Jul 2024 04:00), Max: 101.3 (13 Jul 2024 21:00)  HR: 119 (14 Jul 2024 06:15) (101 - 128)  BP: --  BP(mean): --  ABP: 81/56 (14 Jul 2024 06:15) (80/42 - 129/75)  ABP(mean): 66 (14 Jul 2024 06:15) (52 - 94)  RR: 23 (14 Jul 2024 06:15) (18 - 38)  SpO2: 100% (14 Jul 2024 06:15) (99% - 100%)    O2 Parameters below as of 13 Jul 2024 20:00  Patient On (Oxygen Delivery Method): ventilator    O2 Concentration (%): 30      Mode: AC/ CMV (Assist Control/ Continuous Mandatory Ventilation), RR (machine): 16, TV (machine): 450, FiO2: 30, PEEP: 5, ITime: 0.58, MAP: 11, PIP: 23  Plateau pressure:   P/F ratio:     07-12 @ 07:01  -  07-13 @ 07:00  --------------------------------------------------------  IN: 6345.5 mL / OUT: 3460 mL / NET: 2885.5 mL    07-13 @ 07:01  -  07-14 @ 06:57  --------------------------------------------------------  IN: 3358.4 mL / OUT: 1230 mL / NET: 2128.4 mL      CAPILLARY BLOOD GLUCOSE          ECG: reviewed.    PHYSICAL EXAM:    GENERAL: NAD, lying in bed comfortably  HEAD:  Atraumatic, normocephalic  EYES: Eyes closed  NECK: Supple, trachea midline, no JVD  HEART: Regular rate and rhythm, no murmurs, rubs, or gallops  LUNGS: Unlabored respirations.   ABDOMEN: Soft, nontender  EXTREMITIES: No clubbing, cyanosis, or edema  NERVOUS SYSTEM:  Sedated  SKIN: No rashes or lesions    MEDICATIONS:  MEDICATIONS  (STANDING):  atorvastatin 80 milliGRAM(s) Oral at bedtime  chlorhexidine 0.12% Liquid 15 milliLiter(s) Oral Mucosa every 12 hours  chlorhexidine 2% Cloths 1 Application(s) Topical <User Schedule>  clopidogrel Tablet 75 milliGRAM(s) Enteral Tube daily  dexMEDEtomidine Infusion 0.3 MICROgram(s)/kG/Hr (7.4 mL/Hr) IV Continuous <Continuous>  folic acid Injectable 1 milliGRAM(s) IV Push daily  midodrine 10 milliGRAM(s) Oral every 8 hours  pantoprazole  Injectable 40 milliGRAM(s) IV Push every 12 hours  phenylephrine    Infusion 0.25 MICROgram(s)/kG/Min (9.25 mL/Hr) IV Continuous <Continuous>  piperacillin/tazobactam IVPB.. 3.375 Gram(s) IV Intermittent every 12 hours  potassium chloride   Solution 40 milliEquivalent(s) Oral every 4 hours  propofol Infusion 10 MICROgram(s)/kG/Min (5.92 mL/Hr) IV Continuous <Continuous>  thiamine Injectable 100 milliGRAM(s) IV Push daily  vancomycin    Solution 125 milliGRAM(s) Oral every 6 hours    MEDICATIONS  (PRN):  acetaminophen     Tablet .. 650 milliGRAM(s) Oral every 6 hours PRN Temp greater or equal to 38C (100.4F)  fentaNYL    Injectable 50 MICROGram(s) IV Push every 4 hours PRN Severe Pain (7 - 10)      ALLERGIES:  Allergies    No Known Allergies    Intolerances        LABS:                        14.4   14.65 )-----------( 112      ( 14 Jul 2024 00:26 )             41.8     07-14    142  |  110<H>  |  66<H>  ----------------------------<  125<H>  3.2<L>   |  16<L>  |  1.53<H>    Ca    7.8<L>      14 Jul 2024 00:27  Phos  3.3     07-14  Mg     1.9     07-14    TPro  5.2<L>  /  Alb  2.2<L>  /  TBili  1.4<H>  /  DBili  x   /  AST  145<H>  /  ALT  138<H>  /  AlkPhos  231<H>  07-14    PT/INR - ( 14 Jul 2024 01:53 )   PT: 12.6 sec;   INR: 1.15 ratio         PTT - ( 14 Jul 2024 01:53 )  PTT:33.8 sec  Urinalysis Basic - ( 14 Jul 2024 00:27 )    Color: x / Appearance: x / SG: x / pH: x  Gluc: 125 mg/dL / Ketone: x  / Bili: x / Urobili: x   Blood: x / Protein: x / Nitrite: x   Leuk Esterase: x / RBC: x / WBC x   Sq Epi: x / Non Sq Epi: x / Bacteria: x      ABG:  pH, Arterial: 7.44 (07-14-24 @ 00:15)  pCO2, Arterial: 29 mmHg (07-14-24 @ 00:15)  pO2, Arterial: 150 mmHg (07-14-24 @ 00:15)      vBG:    Micro:    Culture - Blood (collected 07-10-24 @ 20:35)  Source: .Blood Blood-Peripheral  Preliminary Report (07-14-24 @ 03:01):    No growth at 72 Hours    Culture - Blood (collected 07-10-24 @ 20:30)  Source: .Blood Blood-Peripheral  Preliminary Report (07-14-24 @ 03:01):    No growth at 72 Hours        Culture - Sputum (collected 07-13-24 @ 11:23)  Source: ET Tube ET Tube  Gram Stain (07-13-24 @ 23:36):    Moderate polymorphonuclear leukocytes per low power field    No Squamous epithelial cells per low power field    Numerous Gram Negative Rods per oil power field    Few Gram Positive Rods per oil power field        RADIOLOGY & ADDITIONAL TESTS: Reviewed.

## 2024-07-14 NOTE — PROGRESS NOTE ADULT - ASSESSMENT
79 yo male with PMH of CAD s/p PCI x3 with most recent stent 6/12/24 on Plavix, chronic Afib on eliquis, orthostatic hypotension on midodrine, PAD, 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 Patient states since discharge on this past Saturday, he continued to feel ongoing generalized weakness and dizziness with positional changes despite compliance with home midodrine 15mg TID and holding torsemide as instructed as of day PTA . Admits to poor PO intake of fluids/solute due to ongoing issues with poor appetite and nausea with meals which is not new. In the ED, vitals notable for SBP 92-11s. Labs notable for elevated BUN/Cr 31/1.59 (from 30/1.18 on day of discharge 6/29/24). CXR with clear lungs. Abd US with innumerable intrahepatic echogenic masses consistent with known metastatic neuroendocrine tumor. Admitted to medicine for symptomatic hypotension and RAZIA.      1- RAZIA   2- orthostatic hypotension   3- hx chf   4- pericarditis   5- diarrhea       RAZIA in setting of hypotension, anemia -> GIB, hemorrhagic shock leading to oliguric ATN  received CT IV contrast, and emergently went to IR for mesenteric embolization 7/9  now creatinine is improving  non oliguric   decreasing pressor requirements.   acidosis, continue sodium bicarb drip  strict I/O  monitor creatinine closely

## 2024-07-14 NOTE — PROGRESS NOTE ADULT - CRITICAL CARE ATTENDING COMMENT
Patient critically ill requiring frequent bedside visits.    Patient is a 78M extensive history including CAD s/p stent (most recent 6/12/24), Afib on AC, orthostatic hypotension on midodrine, and pancreatic neuroendocrine tumor who presents with hypotension due to acute GI bleed. The patient has had worsening shock state and multiorgan failure.    #Neuro - intubated and sedated.   - Wean sedation as able - lower Prop today  #Cardiovascular - shock state due to GIB now improving - was previously on 3 pressors now on Ady and Vaso  - Maintain MAP > 65  - Previously on Levo but had RVR. Monitor HR  - NURIA recently was off DAPT for few days in setting of bleeding but Plavix restarted this AM after being on Cangrelor last night. Will monitor closely.  --- On 6/12/24 patient had PCI with NURIA to large RPL and patent LAD stent  - Orthostatic hypotension - Increase Midodrine and add Droxidopa  - Cardiology evaluation noted  #Pulmonary - acute hypoxemic respiratory failure in setting of GI bleed  - Maintain O2 sat > 90%  - Continue mechanical ventilation and wean as able - start PSV today  #Gastro - massive GIB s/p EGD x 2 and empiric GDA embolization on 7/9  - 2nd EGD done 7/12 noted duodenal ulcers and clot but no active bleeding  - C. diff positive and started on PO Vanco - diarrhea improving  - Continue PPI  - Transaminitis improving - shock liver  - Oropharyngeal dysphagia - OGT placed for feeds and meds. Clear liquid feeds via ensure  #Nephro - acute renal failure due to ATN now slowly improving  - Monitor Cr and urine output. No longer requiring bicarb  #Infectious Disease - Continue antibiotics in setting of shock state and fevers  - Monitor temperature curve and follow-up cultures  - D/C Ivory  - Will D/C RIJ Cordis but will need another central access given dual vasopressors. Plan to change today.  - C. diff positive - PO Vanco  #Endo - monitor FS and adjust insulin as needed for goal FS < 180  #Hem/Onc - pancreatic neuroendocrine tumor  - Outpatient follow-up at [facility] [place] - per Onc seeing patient at [facility] there does appear to be progression of disease  #DVT proph - SCD  #GOC - Full Code    Prognosis guarded but has shown some clinical improvement over last 48 hours

## 2024-07-14 NOTE — PROGRESS NOTE ADULT - ASSESSMENT
77 yo male with PMH of CAD s/p PCI x3 with most recent stent 6/12/24 on Plavix, chronic Afib on eliquis, orthostatic hypotension on midodrine, PAD, neuroendocrine tumor with RT currently on hold, recent admission for dx cath on 6/24/24 who presented for hypotension, admitted for GIB and hemorrhagic shock. Found to be bleeding from duodenum.     1. Pancreatic NET- metastatic   -- was on lanreotide then had POD in liver and started on peptide receptor radiotherapy (PRRT)- typically given every 8 weeks for 4 doses. He received one dose on 10/20/2023 and planned to get his 2nd dose at the end of December however his course was complicated by multiple hospitalizations that were noncancer related (decompensated heart failure).   -- 5/28/24 PET Dotatate (compared to 9/2023): several new somatostatin avid osseous lesions, some faintly sclerotic, suspicious for mets. Increased upper RP nodes, probably metastatic. Decreased intensely tracer avid right supradiaphragmatic and upper abdominal nodes, suspicious for metastasis. Redemonstrated intensely somatostatin avid bilobar hepatic lesions, consistent with metastases again morphologically difficult to delineate.   -- CT here reviewed by [facility]: SINCE MAY 8 2024 INCREASED HEPATIC METASTASES, NEWLY SEEN PRIMARY TUMOR IN THE PANCREAS, and active bleeding within the duodenum with associated intraluminal hematoma.   -- chromogranin level is elevated at 2058, but no prior [place] [facility]   -- [place]ia Prasad [place] [facility] [place], no plan for treatment inpatient, if clinically improves/stabilizes then can be considered for treatment outpatient    2. Duodenal bleed, Hemorrhagic shock  - Cardiology following, currently on 2 pressors   - Per endocrine, adrenal insufficiency ruled out given AM cortisol of 17, continues midodrine  - GI PCR indeterminate for norovirus on repeat test  - RRT 7/9 for Hypotension, shortness of breath, drop in hemoglobin.   - Found to have + FOBT, CT w/ bleed in duodenum. GI called, EGD 7/9 -> MICU consulted for uncontrolled bleeding followed by A-fib. S/p  IR embolization 7/9, intubated in MICU  - s/p multiple transfusions, fibrinogen low WOULD GIVE CRYO; would consider DIC but coags have improved as are essentially normal now so unlikely, probably was related to liver dysfunction. Can check factor levels V, VIII, X   - Once infection ruled out, may start octreotide 150 mcg BID  - s/p repeat EGD 7/12 showing duodenal lesions w/clots and oozing, no clear targets for intervention and no active bleeding, purastat applied to all areas of oozing. Hgb stable today     3. C. diff diarrhea  - started on po vanc     Siri Baron MD  Hematology-Oncology   [facility]  [phone] (Office)

## 2024-07-14 NOTE — PROGRESS NOTE ADULT - SUBJECTIVE AND OBJECTIVE BOX
INTERVAL HPI/OVERNIGHT EVENTS:  Patient S&E at bedside. Febrile overnight. Plavix restarted. Tested positive for c. diff. Started on po vanc. Remains on 2 pressors. No bleeding.     VITAL SIGNS:  T(F): 100 (07-14-24 @ 12:00)  HR: 98 (07-14-24 @ 13:30)  BP: --  RR: 24 (07-14-24 @ 13:30)  SpO2: 100% (07-14-24 @ 13:30)  Wt(kg): --    PHYSICAL EXAM:    Constitutional: NAD, intubated and sedated   Eyes: EOMI, sclera non-icteric  Neck: supple, no masses, no JVD  Respiratory: symmetric chest expansion   Cardiovascular: RRR, no M/R/G  Gastrointestinal: soft, NTND  Extremities: no c/c/e  Neurological: AAOx3      MEDICATIONS  (STANDING):  atorvastatin 80 milliGRAM(s) Oral at bedtime  chlorhexidine 0.12% Liquid 15 milliLiter(s) Oral Mucosa every 12 hours  chlorhexidine 2% Cloths 1 Application(s) Topical <User Schedule>  clopidogrel Tablet 75 milliGRAM(s) Enteral Tube daily  dexMEDEtomidine Infusion 0.3 MICROgram(s)/kG/Hr (7.4 mL/Hr) IV Continuous <Continuous>  droxidopa 100 milliGRAM(s) Oral three times a day  folic acid Injectable 1 milliGRAM(s) IV Push daily  midodrine 20 milliGRAM(s) Oral every 8 hours  pantoprazole  Injectable 40 milliGRAM(s) IV Push every 12 hours  phenylephrine    Infusion 0.25 MICROgram(s)/kG/Min (9.25 mL/Hr) IV Continuous <Continuous>  piperacillin/tazobactam IVPB.. 3.375 Gram(s) IV Intermittent every 12 hours  propofol Infusion 10 MICROgram(s)/kG/Min (5.92 mL/Hr) IV Continuous <Continuous>  thiamine Injectable 100 milliGRAM(s) IV Push daily  vancomycin    Solution 125 milliGRAM(s) Oral every 6 hours  vasopressin Infusion 0.04 Unit(s)/Min (6 mL/Hr) IV Continuous <Continuous>    MEDICATIONS  (PRN):  acetaminophen     Tablet .. 650 milliGRAM(s) Oral every 6 hours PRN Temp greater or equal to 38C (100.4F)  fentaNYL    Injectable 50 MICROGram(s) IV Push every 4 hours PRN Severe Pain (7 - 10)      Allergies    No Known Allergies    Intolerances        LABS:                        13.9   13.97 )-----------( 91       ( 14 Jul 2024 12:57 )             41.1     07-14    141  |  110<H>  |  67<H>  ----------------------------<  172<H>  4.9   |  14<L>  |  1.54<H>    Ca    7.5<L>      14 Jul 2024 12:57  Phos  4.6     07-14  Mg     2.0     07-14    TPro  5.0<L>  /  Alb  2.0<L>  /  TBili  1.2  /  DBili  x   /  AST  124<H>  /  ALT  117<H>  /  AlkPhos  227<H>  07-14    PT/INR - ( 14 Jul 2024 01:53 )   PT: 12.6 sec;   INR: 1.15 ratio         PTT - ( 14 Jul 2024 01:53 )  PTT:33.8 sec  Urinalysis Basic - ( 14 Jul 2024 12:57 )    Color: x / Appearance: x / SG: x / pH: x  Gluc: 172 mg/dL / Ketone: x  / Bili: x / Urobili: x   Blood: x / Protein: x / Nitrite: x   Leuk Esterase: x / RBC: x / WBC x   Sq Epi: x / Non Sq Epi: x / Bacteria: x        RADIOLOGY & ADDITIONAL TESTS:  Studies reviewed.

## 2024-07-15 NOTE — PROGRESS NOTE ADULT - SUBJECTIVE AND OBJECTIVE BOX
INTERVAL HPI/OVERNIGHT EVENTS: Pt was on cangrelor infusion yesterday, switched to Plavix today. Received metoprolol 2.5 mg x1 for tachycardia in AM. Cachorro SWANN diff +, started on vancomycin.     SUBJECTIVE: Patient seen and examined at bedside.     ROS: Unable to assess given pt's condition.     VITAL SIGNS:  ICU Vital Signs Last 24 Hrs  T(C): 37.5 (14 Jul 2024 04:00), Max: 38.5 (13 Jul 2024 21:00)  T(F): 99.5 (14 Jul 2024 04:00), Max: 101.3 (13 Jul 2024 21:00)  HR: 119 (14 Jul 2024 06:15) (101 - 128)  BP: --  BP(mean): --  ABP: 81/56 (14 Jul 2024 06:15) (80/42 - 129/75)  ABP(mean): 66 (14 Jul 2024 06:15) (52 - 94)  RR: 23 (14 Jul 2024 06:15) (18 - 38)  SpO2: 100% (14 Jul 2024 06:15) (99% - 100%)    O2 Parameters below as of 13 Jul 2024 20:00  Patient On (Oxygen Delivery Method): ventilator    O2 Concentration (%): 30      Mode: AC/ CMV (Assist Control/ Continuous Mandatory Ventilation), RR (machine): 16, TV (machine): 450, FiO2: 30, PEEP: 5, ITime: 0.58, MAP: 11, PIP: 23  Plateau pressure:   P/F ratio:     07-12 @ 07:01  -  07-13 @ 07:00  --------------------------------------------------------  IN: 6345.5 mL / OUT: 3460 mL / NET: 2885.5 mL    07-13 @ 07:01  -  07-14 @ 06:57  --------------------------------------------------------  IN: 3358.4 mL / OUT: 1230 mL / NET: 2128.4 mL      CAPILLARY BLOOD GLUCOSE          ECG: reviewed.    PHYSICAL EXAM:    GENERAL: NAD, lying in bed comfortably  HEAD:  Atraumatic, normocephalic  EYES: Eyes closed  NECK: Supple, trachea midline, no JVD  HEART: Regular rate and rhythm, no murmurs, rubs, or gallops  LUNGS: Unlabored respirations.   ABDOMEN: Soft, nontender  EXTREMITIES: No clubbing, cyanosis, or edema  NERVOUS SYSTEM:  Sedated  SKIN: No rashes or lesions    MEDICATIONS:  MEDICATIONS  (STANDING):  atorvastatin 80 milliGRAM(s) Oral at bedtime  chlorhexidine 0.12% Liquid 15 milliLiter(s) Oral Mucosa every 12 hours  chlorhexidine 2% Cloths 1 Application(s) Topical <User Schedule>  clopidogrel Tablet 75 milliGRAM(s) Enteral Tube daily  dexMEDEtomidine Infusion 0.3 MICROgram(s)/kG/Hr (7.4 mL/Hr) IV Continuous <Continuous>  folic acid Injectable 1 milliGRAM(s) IV Push daily  midodrine 10 milliGRAM(s) Oral every 8 hours  pantoprazole  Injectable 40 milliGRAM(s) IV Push every 12 hours  phenylephrine    Infusion 0.25 MICROgram(s)/kG/Min (9.25 mL/Hr) IV Continuous <Continuous>  piperacillin/tazobactam IVPB.. 3.375 Gram(s) IV Intermittent every 12 hours  potassium chloride   Solution 40 milliEquivalent(s) Oral every 4 hours  propofol Infusion 10 MICROgram(s)/kG/Min (5.92 mL/Hr) IV Continuous <Continuous>  thiamine Injectable 100 milliGRAM(s) IV Push daily  vancomycin    Solution 125 milliGRAM(s) Oral every 6 hours    MEDICATIONS  (PRN):  acetaminophen     Tablet .. 650 milliGRAM(s) Oral every 6 hours PRN Temp greater or equal to 38C (100.4F)  fentaNYL    Injectable 50 MICROGram(s) IV Push every 4 hours PRN Severe Pain (7 - 10)      ALLERGIES:  Allergies    No Known Allergies    Intolerances        LABS:                        14.4   14.65 )-----------( 112      ( 14 Jul 2024 00:26 )             41.8     07-14    142  |  110<H>  |  66<H>  ----------------------------<  125<H>  3.2<L>   |  16<L>  |  1.53<H>    Ca    7.8<L>      14 Jul 2024 00:27  Phos  3.3     07-14  Mg     1.9     07-14    TPro  5.2<L>  /  Alb  2.2<L>  /  TBili  1.4<H>  /  DBili  x   /  AST  145<H>  /  ALT  138<H>  /  AlkPhos  231<H>  07-14    PT/INR - ( 14 Jul 2024 01:53 )   PT: 12.6 sec;   INR: 1.15 ratio         PTT - ( 14 Jul 2024 01:53 )  PTT:33.8 sec  Urinalysis Basic - ( 14 Jul 2024 00:27 )    Color: x / Appearance: x / SG: x / pH: x  Gluc: 125 mg/dL / Ketone: x  / Bili: x / Urobili: x   Blood: x / Protein: x / Nitrite: x   Leuk Esterase: x / RBC: x / WBC x   Sq Epi: x / Non Sq Epi: x / Bacteria: x      ABG:  pH, Arterial: 7.44 (07-14-24 @ 00:15)  pCO2, Arterial: 29 mmHg (07-14-24 @ 00:15)  pO2, Arterial: 150 mmHg (07-14-24 @ 00:15)      vBG:    Micro:    Culture - Blood (collected 07-10-24 @ 20:35)  Source: .Blood Blood-Peripheral  Preliminary Report (07-14-24 @ 03:01):    No growth at 72 Hours    Culture - Blood (collected 07-10-24 @ 20:30)  Source: .Blood Blood-Peripheral  Preliminary Report (07-14-24 @ 03:01):    No growth at 72 Hours        Culture - Sputum (collected 07-13-24 @ 11:23)  Source: ET Tube ET Tube  Gram Stain (07-13-24 @ 23:36):    Moderate polymorphonuclear leukocytes per low power field    No Squamous epithelial cells per low power field    Numerous Gram Negative Rods per oil power field    Few Gram Positive Rods per oil power field        RADIOLOGY & ADDITIONAL TESTS: Reviewed. Jodie Okeefe MD PGY-1  ---------------------------------------------------------------------------------------------  Patient is a 78y old  Male who presents with a chief complaint of hypotension (15 Jul 2024 12:19)      HPI:  77 yo male with PMH of CAD s/p PCI x3 with most recent stent 6/12/24 on Plavix, chronic Afib on eliquis, orthostatic hypotension on midodrine, PAD, 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 Patient states since discharge on this past Saturday, he continued to feel ongoing generalized weakness and dizziness with positional changes despite compliance with home midodrine 15mg TID and holding torsemide as instructed. Admits to poor PO intake of fluids/solute due to ongoing issues with poor appetite and nausea with meals which is not new. Denies any fever, chills, chest pain, SOB, cough, abd pain, dysuria nor urinary frequency. Has chronic diarrhea that is unchanged from his baseline.     In the ED, vitals notable for SBP 92-11s. Labs notable for elevated BUN/Cr 31/1.59 (from 30/1.18 on day of discharge 6/29/24). CXR with clear lungs. Abd US with innumerable intrahepatic echogenic masses consistent with known metastatic neuroendocrine tumor. Admitted to medicine for symptomatic hypotension and RAZIA. (02 Jul 2024 18:34)      SUBJECTIVE / OVERNIGHT EVENTS: ***      MEDICATIONS  (STANDING):  atorvastatin 80 milliGRAM(s) Oral at bedtime  chlorhexidine 2% Cloths 1 Application(s) Topical <User Schedule>  clopidogrel Tablet 75 milliGRAM(s) Enteral Tube daily  dexMEDEtomidine Infusion 0.3 MICROgram(s)/kG/Hr (7.4 mL/Hr) IV Continuous <Continuous>  dextrose 5%. 1000 milliLiter(s) (50 mL/Hr) IV Continuous <Continuous>  dextrose 5%. 1000 milliLiter(s) (100 mL/Hr) IV Continuous <Continuous>  dextrose 50% Injectable 25 Gram(s) IV Push once  dextrose 50% Injectable 25 Gram(s) IV Push once  dextrose 50% Injectable 12.5 Gram(s) IV Push once  droxidopa 100 milliGRAM(s) Oral three times a day  folic acid Injectable 1 milliGRAM(s) IV Push daily  glucagon  Injectable 1 milliGRAM(s) IntraMuscular once  insulin lispro (ADMELOG) corrective regimen sliding scale   SubCutaneous every 6 hours  midodrine 20 milliGRAM(s) Oral every 8 hours  norepinephrine Infusion 0.05 MICROgram(s)/kG/Min (9.25 mL/Hr) IV Continuous <Continuous>  pantoprazole  Injectable 40 milliGRAM(s) IV Push every 12 hours  phenylephrine    Infusion 0.25 MICROgram(s)/kG/Min (9.25 mL/Hr) IV Continuous <Continuous>  piperacillin/tazobactam IVPB.. 3.375 Gram(s) IV Intermittent every 8 hours  thiamine Injectable 100 milliGRAM(s) IV Push daily  vancomycin    Solution 125 milliGRAM(s) Oral every 6 hours  vasopressin Infusion 0.04 Unit(s)/Min (6 mL/Hr) IV Continuous <Continuous>    MEDICATIONS  (PRN):  acetaminophen     Tablet .. 650 milliGRAM(s) Oral every 6 hours PRN Temp greater or equal to 38C (100.4F)  dextrose Oral Gel 15 Gram(s) Oral once PRN Blood Glucose LESS THAN 70 milliGRAM(s)/deciliter  fentaNYL    Injectable 50 MICROGram(s) IV Push every 4 hours PRN Severe Pain (7 - 10)    Allergies    No Known Allergies    Intolerances        ICU Vital Signs Last 24 Hrs  T(F): 98.8 (15 Jul 2024 12:00), Max: 101.8 (14 Jul 2024 17:30)  HR: 86 (15 Jul 2024 13:00) (71 - 121)  BP: 122/62 (15 Jul 2024 13:00) (75/54 - 144/64)  BP(mean): 85 (15 Jul 2024 13:00) (59 - 100)  ABP: 87/50 (15 Jul 2024 08:45) (81/53 - 120/83)  ABP(mean): 64 (15 Jul 2024 08:45) (63 - 98)  RR: 18 (15 Jul 2024 13:00) (15 - 36)  SpO2: 100% (15 Jul 2024 13:00) (89% - 100%)    Mode: CPAP with PS  FiO2: 30  PEEP: 5  PS: 5  MAP: 7  PIP: 12    Physical Exam:   GENERAL: NAD, lying in bed comfortably  HEAD:  Atraumatic, Normocephalic  EYES: EOMI, PERRLA, conjunctiva and sclera clear  ENT: Moist mucous membranes  NECK: Supple, No JVD  CHEST/LUNG: Clear to auscultation bilaterally; No rales, rhonchi, wheezing, or rubs. Unlabored respirations  HEART: Regular rate and rhythm; Normal S1/S2. No murmurs, rubs, or gallops  ABDOMEN: Bowel sounds present; Soft, Nontender, Nondistended. No hepatomegaly  EXTREMITIES:  2+ Peripheral Pulses, brisk capillary refill. No clubbing, cyanosis, or edema  NERVOUS SYSTEM:  Alert & Oriented X3, speech clear. No deficits.  MSK: FROM all 4 extremities, full and equal strength  SKIN: No rashes or lesions    I&O's Summary    14 Jul 2024 07:01  -  15 Jul 2024 07:00  --------------------------------------------------------  IN: 3482.8 mL / OUT: 710 mL / NET: 2772.8 mL    15 Jul 2024 07:01  -  15 Jul 2024 13:22  --------------------------------------------------------  IN: 323.1 mL / OUT: 170 mL / NET: 153.1 mL        LABS:                        13.0   9.95  )-----------( 90       ( 15 Jul 2024 11:50 )             38.4     07-15    142  |  111<H>  |  72<H>  ----------------------------<  237<H>  3.8   |  15<L>  |  1.61<H>    Ca    7.5<L>      15 Jul 2024 11:50  Phos  4.3     07-15  Mg     1.8     07-15    TPro  4.7<L>  /  Alb  2.0<L>  /  TBili  1.4<H>  /  DBili  x   /  AST  103<H>  /  ALT  96<H>  /  AlkPhos  209<H>  07-15    PT/INR - ( 15 Jul 2024 11:50 )   PT: 11.3 sec;   INR: 1.03 ratio         PTT - ( 15 Jul 2024 11:50 )  PTT:31.5 sec  ABG - ( 15 Jul 2024 11:47 )  pH, Arterial: 7.39  pH, Blood: x     /  pCO2: 27    /  pO2: 114   / HCO3: 16    / Base Excess: -7.1  /  SaO2: 99.3                Venous: 07-14-24 @ 15:26 FiO2: -- Oxygen Sat% 72.6    CAPILLARY BLOOD GLUCOSE          RADIOLOGY & ADDITIONAL TESTS:  Results Reviewed:   Imaging Personally Reviewed:  Electrocardiogram Personally Reviewed: Jodie Okeefe MD PGY-1  ---------------------------------------------------------------------------------------------  Patient is a 78y old  Male who presents with a chief complaint of hypotension (15 Jul 2024 12:19)    HPI:  77 yo male with PMH of CAD s/p PCI x3 with most recent stent 6/12/24 on Plavix, chronic Afib on eliquis, orthostatic hypotension on midodrine, PAD, 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 Patient states since discharge on this past Saturday, he continued to feel ongoing generalized weakness and dizziness with positional changes despite compliance with home midodrine 15mg TID and holding torsemide as instructed. Admits to poor PO intake of fluids/solute due to ongoing issues with poor appetite and nausea with meals which is not new. Denies any fever, chills, chest pain, SOB, cough, abd pain, dysuria nor urinary frequency. Has chronic diarrhea that is unchanged from his baseline.     In the ED, vitals notable for SBP 92-11s. Labs notable for elevated BUN/Cr 31/1.59 (from 30/1.18 on day of discharge 6/29/24). CXR with clear lungs. Abd US with innumerable intrahepatic echogenic masses consistent with known metastatic neuroendocrine tumor. Admitted to medicine for symptomatic hypotension and RAZIA. (02 Jul 2024 18:34)      SUBJECTIVE / OVERNIGHT EVENTS:   Patient examined at bedside. Unable to provide history 2/2 intubation.      MEDICATIONS  (STANDING):  atorvastatin 80 milliGRAM(s) Oral at bedtime  chlorhexidine 2% Cloths 1 Application(s) Topical <User Schedule>  clopidogrel Tablet 75 milliGRAM(s) Enteral Tube daily  dexMEDEtomidine Infusion 0.3 MICROgram(s)/kG/Hr (7.4 mL/Hr) IV Continuous <Continuous>  dextrose 5%. 1000 milliLiter(s) (50 mL/Hr) IV Continuous <Continuous>  dextrose 5%. 1000 milliLiter(s) (100 mL/Hr) IV Continuous <Continuous>  dextrose 50% Injectable 25 Gram(s) IV Push once  dextrose 50% Injectable 25 Gram(s) IV Push once  dextrose 50% Injectable 12.5 Gram(s) IV Push once  droxidopa 100 milliGRAM(s) Oral three times a day  folic acid Injectable 1 milliGRAM(s) IV Push daily  glucagon  Injectable 1 milliGRAM(s) IntraMuscular once  insulin lispro (ADMELOG) corrective regimen sliding scale   SubCutaneous every 6 hours  midodrine 20 milliGRAM(s) Oral every 8 hours  norepinephrine Infusion 0.05 MICROgram(s)/kG/Min (9.25 mL/Hr) IV Continuous <Continuous>  pantoprazole  Injectable 40 milliGRAM(s) IV Push every 12 hours  phenylephrine    Infusion 0.25 MICROgram(s)/kG/Min (9.25 mL/Hr) IV Continuous <Continuous>  piperacillin/tazobactam IVPB.. 3.375 Gram(s) IV Intermittent every 8 hours  thiamine Injectable 100 milliGRAM(s) IV Push daily  vancomycin    Solution 125 milliGRAM(s) Oral every 6 hours  vasopressin Infusion 0.04 Unit(s)/Min (6 mL/Hr) IV Continuous <Continuous>    MEDICATIONS  (PRN):  acetaminophen     Tablet .. 650 milliGRAM(s) Oral every 6 hours PRN Temp greater or equal to 38C (100.4F)  dextrose Oral Gel 15 Gram(s) Oral once PRN Blood Glucose LESS THAN 70 milliGRAM(s)/deciliter  fentaNYL    Injectable 50 MICROGram(s) IV Push every 4 hours PRN Severe Pain (7 - 10)    Allergies    No Known Allergies    Intolerances        ICU Vital Signs Last 24 Hrs  T(F): 98.8 (15 Jul 2024 12:00), Max: 101.8 (14 Jul 2024 17:30)  HR: 86 (15 Jul 2024 13:00) (71 - 121)  BP: 122/62 (15 Jul 2024 13:00) (75/54 - 144/64)  BP(mean): 85 (15 Jul 2024 13:00) (59 - 100)  ABP: 87/50 (15 Jul 2024 08:45) (81/53 - 120/83)  ABP(mean): 64 (15 Jul 2024 08:45) (63 - 98)  RR: 18 (15 Jul 2024 13:00) (15 - 36)  SpO2: 100% (15 Jul 2024 13:00) (89% - 100%)    Mode: CPAP with PS  FiO2: 30  PEEP: 5  PS: 5  MAP: 7  PIP: 12    Physical Exam:   GENERAL: NAD, lying in bed comfortably, intubated  HEAD:  Atraumatic, Normocephalic  EYES: EOMI, PERRLA, conjunctiva and sclera clear  NECK: No JVD  CHEST/LUNG: Clear to auscultation bilaterally; No rales, rhonchi, wheezing, or rubs. Unlabored respirations  HEART: Regular rate and rhythm; Normal S1/S2. No murmurs, rubs, or gallops  ABDOMEN: Bowel sounds present; Soft, Nontender, Nondistended  EXTREMITIES:  2+ Peripheral Pulses, brisk capillary refill. Mild bilateral pitting edema  NERVOUS SYSTEM:  Unable to assess  MSK: Unable to assess  SKIN: No rashes or lesions    I&O's Summary    14 Jul 2024 07:01  -  15 Jul 2024 07:00  --------------------------------------------------------  IN: 3482.8 mL / OUT: 710 mL / NET: 2772.8 mL    15 Jul 2024 07:01  -  15 Jul 2024 13:22  --------------------------------------------------------  IN: 323.1 mL / OUT: 170 mL / NET: 153.1 mL        LABS:                        13.0   9.95  )-----------( 90       ( 15 Jul 2024 11:50 )             38.4     07-15    142  |  111<H>  |  72<H>  ----------------------------<  237<H>  3.8   |  15<L>  |  1.61<H>    Ca    7.5<L>      15 Jul 2024 11:50  Phos  4.3     07-15  Mg     1.8     07-15    TPro  4.7<L>  /  Alb  2.0<L>  /  TBili  1.4<H>  /  DBili  x   /  AST  103<H>  /  ALT  96<H>  /  AlkPhos  209<H>  07-15    PT/INR - ( 15 Jul 2024 11:50 )   PT: 11.3 sec;   INR: 1.03 ratio         PTT - ( 15 Jul 2024 11:50 )  PTT:31.5 sec  ABG - ( 15 Jul 2024 11:47 )  pH, Arterial: 7.39  pH, Blood: x     /  pCO2: 27    /  pO2: 114   / HCO3: 16    / Base Excess: -7.1  /  SaO2: 99.3                Venous: 07-14-24 @ 15:26 FiO2: -- Oxygen Sat% 72.6    CAPILLARY BLOOD GLUCOSE          RADIOLOGY & ADDITIONAL TESTS:  Results Reviewed:   Imaging Personally Reviewed:  Electrocardiogram Personally Reviewed:

## 2024-07-15 NOTE — PROGRESS NOTE ADULT - ASSESSMENT
77 yo male with PMH of CAD s/p PCI x3 with most recent stent 6/12/24 on Plavix, chronic Afib on eliquis, orthostatic hypotension on midodrine, PAD, neuroendocrine tumor with RT currently on hold, recent admission for dx cath on 6/24/24 who presented for hypotension, admitted for GIB and hemorrhagic shock. Found to be bleeding from duodenum. Transferred to MICU, on pressors.     1. Pancreatic NET- metastatic   -- was on lanreotide then had POD in liver and started on peptide receptor radiotherapy (PRRT)- typically given every 8 weeks for 4 doses. He received one dose on 10/20/2023 and planned to get his 2nd dose at the end of December however his course was complicated by multiple hospitalizations that were noncancer related (decompensated heart failure).   -- 5/28/24 PET Dotatate (compared to 9/2023): several new somatostatin avid osseous lesions, some faintly sclerotic, suspicious for mets. Increased upper RP nodes, probably metastatic. Decreased intensely tracer avid right supradiaphragmatic and upper abdominal nodes, suspicious for metastasis. Redemonstrated intensely somatostatin avid bilobar hepatic lesions, consistent with metastases again morphologically difficult to delineate.   -- CT here reviewed by [facility]: SINCE MAY 8 2024 INCREASED HEPATIC METASTASES, NEWLY SEEN PRIMARY TUMOR IN THE PANCREAS, and active bleeding within the duodenum with associated intraluminal hematoma.   -- chromogranin level is elevated at 2058, but no prior level at [facility] for review   -- f/u with Dr. Sophia Prasad at [facility] after discharge, no plan for treatment inpatient, if clinically improves/stabilizes then can be considered for treatment outpatient    2. Duodenal bleed, Hemorrhagic shock  - Cardiology following, currently on 2 pressors   - Per endocrine, adrenal insufficiency ruled out given AM cortisol of 17, continues midodrine  - GI PCR indeterminate for norovirus on repeat test  - RRT 7/9 for Hypotension, shortness of breath, drop in hemoglobin.   - CT w/ bleed in duodenum. GI called, EGD 7/9 -> MICU consulted for uncontrolled bleeding followed by A-fib. S/p  IR embolization 7/9, intubated in MICU -> extubated today  - s/p multiple transfusions, fibrinogen low WOULD GIVE CRYO; would consider DIC but coags have improved as are essentially normal now so unlikely, probably was related to liver dysfunction. Can check factor levels V, VIII, X   - Once infection ruled out, may start octreotide 150 mcg BID  - s/p repeat EGD 7/12 showing duodenal lesions w/clots and oozing, no clear targets for intervention and no active bleeding, purastat applied to all areas of oozing. Hgb stable today, no overt bleeding    3. C. diff diarrhea  - on po vanc     Siri Baron MD  Hematology-Oncology   [facility]  [phone] (Office)

## 2024-07-15 NOTE — PROGRESS NOTE ADULT - SUBJECTIVE AND OBJECTIVE BOX
DATE OF SERVICE: 07-15-24 @ 14:47    Patient is a 78y old  Male who presents with a chief complaint of hypotension (15 Jul 2024 12:19)      INTERVAL HISTORY: Extubated this am, remains on pressors    REVIEW OF SYSTEMS:  CONSTITUTIONAL: No weakness  EYES/ENT: No visual changes;  No throat pain   NECK: No pain or stiffness  RESPIRATORY: No cough, wheezing; No shortness of breath  CARDIOVASCULAR: No chest pain or palpitations  GASTROINTESTINAL: No abdominal  pain. No nausea, vomiting, or hematemesis  GENITOURINARY: No dysuria, frequency or hematuria  NEUROLOGICAL: No stroke like symptoms  SKIN: No rashes      	  MEDICATIONS:  doxazosin 2 milliGRAM(s) Oral at bedtime  droxidopa 200 milliGRAM(s) Oral every 8 hours  midodrine 30 milliGRAM(s) Oral every 8 hours  norepinephrine Infusion 0.05 MICROgram(s)/kG/Min IV Continuous <Continuous>  phenylephrine    Infusion 0.25 MICROgram(s)/kG/Min IV Continuous <Continuous>        PHYSICAL EXAM:  T(C): 37.1 (07-15-24 @ 12:00), Max: 38.8 (07-14-24 @ 17:30)  HR: 88 (07-15-24 @ 13:45) (71 - 121)  BP: 99/62 (07-15-24 @ 13:45) (75/54 - 144/64)  RR: 25 (07-15-24 @ 13:45) (15 - 36)  SpO2: 100% (07-15-24 @ 13:45) (89% - 100%)  Wt(kg): --  I&O's Summary    14 Jul 2024 07:01  -  15 Jul 2024 07:00  --------------------------------------------------------  IN: 3482.8 mL / OUT: 710 mL / NET: 2772.8 mL    15 Jul 2024 07:01  -  15 Jul 2024 14:47  --------------------------------------------------------  IN: 323.1 mL / OUT: 170 mL / NET: 153.1 mL          Appearance: In no distress	  HEENT:    PERRL, EOMI	  Cardiovascular:  S1 S2, No JVD  Respiratory: Lungs clear to auscultation	  Gastrointestinal:  Soft, Non-tender, + BS	  Vascularature:  No edema of LE  Psychiatric: Appropriate affect   Neuro: no acute focal deficits                               13.0   9.95  )-----------( 90       ( 15 Jul 2024 11:50 )             38.4     07-15    142  |  111<H>  |  72<H>  ----------------------------<  237<H>  3.8   |  15<L>  |  1.61<H>    Ca    7.5<L>      15 Jul 2024 11:50  Phos  4.3     07-15  Mg     1.8     07-15    TPro  4.7<L>  /  Alb  2.0<L>  /  TBili  1.4<H>  /  DBili  x   /  AST  103<H>  /  ALT  96<H>  /  AlkPhos  209<H>  07-15        Labs personally reviewed      ASSESSMENT/PLAN: 	  78-year-old male multiple medical problems history of hepatocellular carcinoma status post radiation therapy, AF s/p DCCV on Eliquis who was found to be hypotensive following NST. Patient has reported general weakness, fatigue, lightheadedness since being discharged from hospital. Reports mild chest discomfort in midsternal region. Reports dyspnea with minimal exertion. Also reports significant lack of appetite and poor PO intake. No dark or bloody stool, nausea or vomiting.       Problem/Plan - 1:  ·  Problem: Hypotension  ·  Plan: Continue with pressors in MICU  - Patient presents with hypotension and also RAZIA. Has known orthostatic hypotension.   - Patient and wife report decreased PO intake likely 2/2 malignancy.  - GIB 7/9, s/p 4 units prbc, IR for mesenteric embolization, now in MICU on pressors     Problem/Plan - 2:  ·  Problem: CAD.   ·  Plan: Recent PCI to pLAD in June 2023  - ECG non-ischemic  - Given recurrent bleed, will have to DC Plavix for now. Plan to resume as soon as possible.      Problem/Plan - 3:  ·  Problem: Atrial Fibrillation  - s/p succesful DCCV 10/31  - Hold Eliquis 5mg BID given GIB  - C/w of Sotalol 40mg PO daily (qTC wnl)    Problem/Plan - 4:  ·  Problem: H/O Pericarditis.   ·  Plan: Chest pain now improved since last admission.  -  d/c colchicine 0.6mg PO daily    Problem/Plan - 4:  ·  Problem: Preop Risk Stratification.   ·  Plan: Patient is moderate risk for low risk, urgent endoscopy.  No cardiac contraindication to proceeed.  Ideally should continue Plavix/Cangleror given recent Stent if able.     Problem/Plan - 5:  ·  Problem: CKD  ·  Plan: RAZIA in setting of hypotension, anemia -> GIB, hemorrhagic shock leading to oliguric ATN received CT IV contrast, and emergently went to IR for mesenteric embolization 7/9  now creatinine is improving, non oliguric , decreasing pressor requirements.   acidosis, continue sodium bicarb drip  strict I/O  monitor creatinine closely  nephro on board     Problem/Plan - 5:  ·  Problem: DVT prophylactic   ·  Plan: SCD's , OOB-CHAIR   - resume Eliquis when hemodynamically stable            ISAMAR Foster DO [facility]  Cardiovascular Medicine  [address], [address]  Available via call or text on [employer]  Office: [phone]

## 2024-07-15 NOTE — PROGRESS NOTE ADULT - ATTENDING COMMENTS
s/p severe GIB 2/2 duodenal ulcers now s/p IR intervention, and re-look with EGD with moderate risk DUs (flat pigmented spots, clots in area) sp purastat  Stable hgb on ppi  C diff treated as per ICU team    No further GI endoscopic evaluation at this time  c/w iv ppi bid x 8 weeks  check hp serology - if positive would treat  Diet as tolerated    GI to s/o, call with any ?

## 2024-07-15 NOTE — PROGRESS NOTE ADULT - SUBJECTIVE AND OBJECTIVE BOX
[facility]   [phone]  RENAL FOLLOW UP NOTE  --------------------------------------------------------------------------------  Chief Complaint:    24 hour events/subjective:    seen earlier   intubated   sedated     PAST HISTORY  --------------------------------------------------------------------------------  No significant changes to PMH, PSH, FHx, SHx, unless otherwise noted    ALLERGIES & MEDICATIONS  --------------------------------------------------------------------------------  Allergies    No Known Allergies    Intolerances      Standing Inpatient Medications  atorvastatin 80 milliGRAM(s) Oral at bedtime  chlorhexidine 2% Cloths 1 Application(s) Topical <User Schedule>  clopidogrel Tablet 75 milliGRAM(s) Enteral Tube daily  dexMEDEtomidine Infusion 0.3 MICROgram(s)/kG/Hr IV Continuous <Continuous>  doxazosin 2 milliGRAM(s) Oral at bedtime  droxidopa 200 milliGRAM(s) Oral every 8 hours  folic acid Injectable 1 milliGRAM(s) IV Push daily  glucagon  Injectable 1 milliGRAM(s) IntraMuscular once  insulin lispro (ADMELOG) corrective regimen sliding scale   SubCutaneous every 6 hours  midodrine 30 milliGRAM(s) Oral every 8 hours  norepinephrine Infusion 0.05 MICROgram(s)/kG/Min IV Continuous <Continuous>  pantoprazole  Injectable 40 milliGRAM(s) IV Push every 12 hours  phenylephrine    Infusion 0.25 MICROgram(s)/kG/Min IV Continuous <Continuous>  piperacillin/tazobactam IVPB.. 3.375 Gram(s) IV Intermittent every 8 hours  thiamine Injectable 100 milliGRAM(s) IV Push daily  vancomycin    Solution 125 milliGRAM(s) Oral every 6 hours  vasopressin Infusion 0.04 Unit(s)/Min IV Continuous <Continuous>    PRN Inpatient Medications  acetaminophen     Tablet .. 650 milliGRAM(s) Oral every 6 hours PRN  fentaNYL    Injectable 50 MICROGram(s) IV Push every 4 hours PRN      REVIEW OF SYSTEMS  --------------------------------------------------------------------------------    intubated    VITALS/PHYSICAL EXAM  --------------------------------------------------------------------------------  T(C): 37.2 (07-15-24 @ 20:00), Max: 38 (07-14-24 @ 21:00)  HR: 109 (07-15-24 @ 20:00) (71 - 121)  BP: 92/57 (07-15-24 @ 20:00) (75/54 - 144/73)  RR: 30 (07-15-24 @ 20:00) (15 - 37)  SpO2: 100% (07-15-24 @ 20:00) (95% - 100%)  Wt(kg): --        07-14-24 @ 07:01  -  07-15-24 @ 07:00  --------------------------------------------------------  IN: 3482.8 mL / OUT: 710 mL / NET: 2772.8 mL    07-15-24 @ 07:01  -  07-15-24 @ 20:53  --------------------------------------------------------  IN: 567.8 mL / OUT: 415 mL / NET: 152.8 mL      Physical Exam:  	    	Gen: intubated sedated on pressors   	Pulm: decrease bs  no rales  +  ronchi  -  wheezing  	CV: No JVD. RRR, S1S2; no rub  	Abd: +BS, soft /nondistended  	: No bladder distention   	UE: Warm, no cyanosis  no clubbing,  no edema  	LE: Warm, no cyanosis  no clubbing,  4+  edema  	Neuro:  sedated       LABS/STUDIES  --------------------------------------------------------------------------------              13.0   9.95  >-----------<  90       [07-15-24 @ 11:50]              38.4     142  |  111  |  72  ----------------------------<  237      [07-15-24 @ 11:50]  3.8   |  15  |  1.61        Ca     7.5     [07-15-24 @ 11:50]      Mg     1.8     [07-15-24 @ 11:50]      Phos  4.3     [07-15-24 @ 11:50]    TPro  4.7  /  Alb  2.0  /  TBili  1.4  /  DBili  x   /  AST  103  /  ALT  96  /  AlkPhos  209  [07-15-24 @ 11:50]    PT/INR: PT 11.3 , INR 1.03       [07-15-24 @ 11:50]  PTT: 31.5       [07-5-24 @ 11:50]          [07-15-24 @ 00:33]    Creatinine Trend:  SCr 1.61 [07-15 @ 11:50]  SCr 1.60 [07-15 @ 00:33]  SCr 1.54 [07-14 @ 12:57]  SCr 1.44 [07-14 @ 08:56]  SCr 1.53 [07-14 @ 00:27]      TSH 2.47      [07-04-24 @ 12:31]    < from: CT Abdomen and Pelvis w/ IV Cont (07.09.24 @ 11:36) >  ACC: [id number] EXAM:  CT ABDOMEN AND PELVIS IC   ORDERED BY: ASHLEY HESTER     PROCEDURE DATE:  07/09/2024          INTERPRETATION:  CLINICAL INFORMATION: Trauma, rule out active GI bleeding    COMPARISON: CT chest abdomen pelvis 10/23/2023    CONTRAST/COMPLICATIONS:  IV Contrast: Omnipaque 350  90 cc administered   10 cc discarded  Oral Contrast: NONE  Complications: None reported at time of study completion    PROCEDURE:  CT of the Abdomen and Pelvis was performed.  Precontrast, Arterial and Delayed phases were performed.  Sagittal and coronal reformats were performed.    FINDINGS:  LOWER CHEST: Small left pleural effusion. Small rim of bibasilar   subsegmental atelectasis. Coronary artery calcification    LIVER: Nodular contour of the liver which could represent pseudocirrhosis   in the setting of hepatic metastasis. Size and quantity of metastatic   foci is increased compared to prior.  BILE DUCTS: Normal caliber.  GALLBLADDER: Within normal limits.  SPLEEN: Within normal limits.  PANCREAS: Within normal limits.  ADRENALS: Within normal limits.  KIDNEYS/URETERS: Bilaterally atrophic kidneys. Right renal cyst with   other bilateral renal cystic lesions that are too small to characterize.   Redemonstrated nonobstructive left renal pelvic stone measuring 5 mm.    BLADDER: 2 large stones in the bladder measuring 2.3 x 1.6 cm and 3.6 x   2.6 cm. Stones appear similar in size compared to prior.  REPRODUCTIVE ORGANS: Prostate calcifications.    BOWEL: There is active bleeding within the third portion duodenum which   contains hematoma. Appendix is normal.  PERITONEUM/RETROPERITONEUM: Within normal limits.  VESSELS: Atherosclerotic calcifications.  LYMPH NODES: Few prominent periaortic retroperitoneal lymph nodes. No   lymphadenopathy.  ABDOMINAL WALL: Small left inguinal hernia.  BONES: Multilevel laminectomy and spinal fixation. Status post prior   right total hip arthroplasty.    IMPRESSION:  Active bleeding in the third portion of the duodenum.    Progression of liver metastases.    Findings were discussed with GRETTA Pires by Jani Chavez M.D. on   7/9/2024 at 12:15 PM.    --- End of Report ---    < end of copied text >

## 2024-07-15 NOTE — PROGRESS NOTE ADULT - SUBJECTIVE AND OBJECTIVE BOX
INTERVAL HPI/OVERNIGHT EVENTS:    Patient S&E at bedside. Extubated this morning. Remains on 2 pressors. No bleeding or diarrhea overnight per nursing.     VITAL SIGNS:  T(F): 98.8 (07-15-24 @ 12:00)  HR: 83 (07-15-24 @ 12:00)  BP: 131/79 (07-15-24 @ 12:00)  RR: 16 (07-15-24 @ 12:00)  SpO2: 100% (07-15-24 @ 12:00)  Wt(kg): --    PHYSICAL EXAM:    Constitutional: NAD  Eyes: EOMI, sclera non-icteric  Neck: supple, no masses, no JVD  Respiratory: CTA b/l, good air entry b/l  Cardiovascular: RRR, no M/R/G  Gastrointestinal: soft, NTND, no masses palpable, + BS, no hepatosplenomegaly  Extremities: no c/c/e  Neurological: AAO      MEDICATIONS  (STANDING):  atorvastatin 80 milliGRAM(s) Oral at bedtime  chlorhexidine 2% Cloths 1 Application(s) Topical <User Schedule>  clopidogrel Tablet 75 milliGRAM(s) Enteral Tube daily  dexMEDEtomidine Infusion 0.3 MICROgram(s)/kG/Hr (7.4 mL/Hr) IV Continuous <Continuous>  dextrose 5%. 1000 milliLiter(s) (100 mL/Hr) IV Continuous <Continuous>  dextrose 5%. 1000 milliLiter(s) (50 mL/Hr) IV Continuous <Continuous>  dextrose 50% Injectable 25 Gram(s) IV Push once  dextrose 50% Injectable 12.5 Gram(s) IV Push once  dextrose 50% Injectable 25 Gram(s) IV Push once  droxidopa 100 milliGRAM(s) Oral three times a day  folic acid Injectable 1 milliGRAM(s) IV Push daily  glucagon  Injectable 1 milliGRAM(s) IntraMuscular once  insulin lispro (ADMELOG) corrective regimen sliding scale   SubCutaneous every 6 hours  midodrine 20 milliGRAM(s) Oral every 8 hours  norepinephrine Infusion 0.05 MICROgram(s)/kG/Min (9.25 mL/Hr) IV Continuous <Continuous>  pantoprazole  Injectable 40 milliGRAM(s) IV Push every 12 hours  phenylephrine    Infusion 0.25 MICROgram(s)/kG/Min (9.25 mL/Hr) IV Continuous <Continuous>  piperacillin/tazobactam IVPB.. 3.375 Gram(s) IV Intermittent every 8 hours  thiamine Injectable 100 milliGRAM(s) IV Push daily  vancomycin    Solution 125 milliGRAM(s) Oral every 6 hours  vasopressin Infusion 0.04 Unit(s)/Min (6 mL/Hr) IV Continuous <Continuous>    MEDICATIONS  (PRN):  acetaminophen     Tablet .. 650 milliGRAM(s) Oral every 6 hours PRN Temp greater or equal to 38C (100.4F)  dextrose Oral Gel 15 Gram(s) Oral once PRN Blood Glucose LESS THAN 70 milliGRAM(s)/deciliter  fentaNYL    Injectable 50 MICROGram(s) IV Push every 4 hours PRN Severe Pain (7 - 10)      Allergies    No Known Allergies    Intolerances        LABS:                        13.0   9.95  )-----------( 90       ( 15 Jul 2024 11:50 )             38.4     07-15    143  |  112<H>  |  69<H>  ----------------------------<  267<H>  4.3   |  15<L>  |  1.60<H>    Ca    7.7<L>      15 Jul 2024 00:33  Phos  5.2     07-15  Mg     1.9     07-15    TPro  4.8<L>  /  Alb  1.9<L>  /  TBili  1.2  /  DBili  x   /  AST  173<H>  /  ALT  125<H>  /  AlkPhos  211<H>  07-15    PT/INR - ( 15 Jul 2024 11:50 )   PT: 11.3 sec;   INR: 1.03 ratio         PTT - ( 15 Jul 2024 11:50 )  PTT:31.5 sec  Urinalysis Basic - ( 15 Jul 2024 00:33 )    Color: x / Appearance: x / SG: x / pH: x  Gluc: 267 mg/dL / Ketone: x  / Bili: x / Urobili: x   Blood: x / Protein: x / Nitrite: x   Leuk Esterase: x / RBC: x / WBC x   Sq Epi: x / Non Sq Epi: x / Bacteria: x        RADIOLOGY & ADDITIONAL TESTS:  Studies reviewed.    ASSESSMENT & PLAN:

## 2024-07-15 NOTE — PROGRESS NOTE ADULT - CRITICAL CARE ATTENDING COMMENT
Patient critically ill requiring frequent bedside visits.    Patient is a 78M extensive history including CAD s/p stent (most recent 6/12/24), Afib on AC, orthostatic hypotension on midodrine, and pancreatic neuroendocrine tumor who presents with hypotension due to acute GI bleed. The patient has had worsening shock state and multiorgan failure.    #Neuro - intubated and sedated.   - Wean sedation as able with hopes for extubation attempt  #Cardiovascular - shock state due to GIB now improving - was previously on 3 pressors but now decreased  - Maintain MAP > 65  - Previously on Levo but had RVR. Monitor HR and if not high may restart Levo  - NURIA recently was off DAPT for few days in setting of bleeding but Plavix restarted 7/14. Will monitor closely.  --- On 6/12/24 patient had PCI with NURIA to large RPL and patent LAD stent  - Orthostatic hypotension - Midodrine and Droxidopa  - Cardiology evaluation noted  #Pulmonary - acute hypoxemic respiratory failure in setting of GI bleed  - Maintain O2 sat > 90%  - Continue mechanical ventilation and wean as able - start PSV today with hopes for extubation attempt  #Gastro - massive GIB s/p EGD x 2 and empiric GDA embolization on 7/9  - 2nd EGD done 7/12 noted duodenal ulcers and clot but no active bleeding  - C. diff positive and started on PO Vanco - diarrhea improving  - Continue PPI  - Transaminitis improving - shock liver  - Oropharyngeal dysphagia - OGT placed for feeds and meds. Clear liquid feeds via ensure  #Nephro - acute renal failure due to ATN relatively stable  - Monitor Cr and urine output. No longer requiring bicarb  #Infectious Disease - Continue antibiotics in setting of shock state and fevers  - Monitor temperature curve and follow-up cultures - ETT culture with Klebsiella  - C. diff positive - PO Vanco  #Endo - monitor FS and adjust insulin as needed for goal FS < 180  #Hem/Onc - pancreatic neuroendocrine tumor  - Outpatient follow-up at [facility] - per Onc seeing patient at [facility] there does appear to be progression of disease  #DVT proph - SCD  #GOC - Full Code    Prognosis guarded but has shown some clinical improvement over last 72 hours. Goal for extubation attempt today Patient critically ill requiring frequent bedside visits.    Patient is a 78M extensive history including CAD s/p stent (most recent 6/12/24), Afib on AC, orthostatic hypotension on midodrine, and pancreatic neuroendocrine tumor who presents with hypotension due to acute GI bleed. The patient has had worsening shock state and multiorgan failure.    #Neuro - intubated and sedated.   - Wean sedation as able with hopes for extubation attempt  #Cardiovascular - shock state due to GIB now improving - was previously on 3 pressors but now decreased  - Maintain MAP > 65  - Previously on Levo but had RVR. Monitor HR and if not high may restart Levo  - NURIA recently was off DAPT for few days in setting of bleeding but Plavix restarted 7/14. Will monitor closely.  --- On 6/12/24 patient had PCI with NURIA to large RPL and patent LAD stent  - Orthostatic hypotension - Midodrine and Droxidopa  - Cardiology evaluation noted  #Pulmonary - acute hypoxemic respiratory failure in setting of GI bleed  - Maintain O2 sat > 90%  - Continue mechanical ventilation and wean as able - PSV trial today with hopes for extubation attempt  #Gastro - massive GIB s/p EGD x 2 and empiric GDA embolization on 7/9  - 2nd EGD done 7/12 noted duodenal ulcers and clot but no active bleeding  - C. diff positive and started on PO Vanco - diarrhea improving  - Continue PPI  - Transaminitis improving - shock liver  - Oropharyngeal dysphagia - OGT placed for feeds and meds. Clear liquid feeds via ensure  #Nephro - acute renal failure due to ATN relatively stable  - Monitor Cr and urine output. No longer requiring bicarb  #Infectious Disease - Continue antibiotics in setting of shock state and fevers  - Monitor temperature curve and follow-up cultures - ETT culture with Klebsiella  - C. diff positive - PO Vanco  #Endo - monitor FS and adjust insulin as needed for goal FS < 180  #Hem/Onc - pancreatic neuroendocrine tumor  - Outpatient follow-up at [facility] - per Onc seeing patient at [facility] there does appear to be progression of disease  #DVT proph - SCD  #GOC - Full Code    Prognosis guarded but has shown some clinical improvement over last 72 hours. Goal for extubation attempt today

## 2024-07-15 NOTE — PROGRESS NOTE ADULT - ASSESSMENT
79 yo male with PMH of CAD s/p PCI x3 with most recent stent 6/12/24 on Plavix, chronic Afib on eliquis, orthostatic hypotension on midodrine, PAD, 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 Patient states since discharge on this past Saturday, he continued to feel ongoing generalized weakness and dizziness with positional changes despite compliance with home midodrine 15mg TID and holding torsemide as instructed as of day PTA . Admits to poor PO intake of fluids/solute due to ongoing issues with poor appetite and nausea with meals which is not new. In the ED, vitals notable for SBP 92-11s. Labs notable for elevated BUN/Cr 31/1.59 (from 30/1.18 on day of discharge 6/29/24). CXR with clear lungs. Abd US with innumerable intrahepatic echogenic masses consistent with known metastatic neuroendocrine tumor. Admitted to medicine for symptomatic hypotension and RAZIA.      1- RAZIA   2-  hypotension   3- hx chf   4- pericarditis   5- C Diff   6- acidosis       RAZIA in setting of hypotension, anemia -> GIB, hemorrhagic shock leading to oliguric ATN  received CT IV contrast, and emergently went to IR for mesenteric embolization 7/9  now creatinine is improving  non oliguric   continue with henny drip and vasopressin drips  vanco 125 mg q 6 enteral   acidosis,  add sodium bicarb 650 mg tid   strict I/O  monitor creatinine closely

## 2024-07-15 NOTE — PROGRESS NOTE ADULT - ASSESSMENT
Assessment: 77 yo male with metastatic neuroendocrine pancreatic cancer (tx on hold) and PMH of CAD s/p PCI x3 with most recent stent 6/12/24 on Plavix and eliquis , chronic Afib on eliquis, orthostatic hypotension on midodrine, PAD , recent admission for dx cath on 6/24/24 presented from PCP's office for hypotension despite taking AM midodrine dose on 7/2, admitted to medicine for symptomatic hypotension and RAZIA. Per chart, on 7/8 PM, pt was noted to have acute onset of melena x5 and coffee ground emesis x2-3. RRT was called on 7/9 AM for hypotension, hgb dropped from 9.2 to 7.4 (admission baseline 10-11), FOBT +, pt was started on PPI IV and transfused 1 unit of pRBC. GI was consulted and pt is scheduled for EGD on 7/9 afternoon. MICU was consulted for uncontrolled bleeding during EGD. During EGD, pt became hypotensive and was given phenylephrine, had A-fib with RVR s/p amiodarone. Now s/p IR embolization, admitted to MICU for further moniring i.s.o hemorrhagic shock 2/2 GI bleed     Plan:     Neuro   # Baseline AOx3  - intubated   - on precedex and propofol, wean off propofol as tolerated  - on henny and vasopressin  - fentanyl prn for pain control (last dose 7/12/24)    CVS  # hemorrhagic shock   - RRT called 7/9 for hypotension   -  2/2 to Upper GI bleeds, urgently took to EGD, found bleeding ulcer that was not well controlled, underwent IR empiric embolization 7/9  - increase midodrine to 20 TID  - start droxidopa 100 q8   - on henny, maintain MAP > 65; took off vaso and precedex (7/13/24), he got tachy to 118. Added precedex back. vasopressin added 7/14  - per spouse, pt's baseline BP is a little bit over 100  - AM cortisol 17 (7/5), AM cortisol 37.1 (7/11)  - transfuse as needed (s/p 5 pRBC prior to MICU and now s/p 4 pRBC, 2 FFP, 2 platelets in MICU)  - restarted plavix 7/13/24 for new stent (6/12/24) per GI       #CAD s/p PCI x3, most recent stent 6/12/24, NURIA to pLAD   - c/w statin   - restarted plavix 7/13/24 for new stent (6/12/24) per GI     #PAD  # A-fib on eliquis    - s/p successful DCCV 10/31   - hold sotalol given hypotension 2/2 GI bleeds   - hold eliquis     PULM   #Intubated for airway protection prior to EGD/IR embolization  - will hold off on SBT if any further procedures are planned   -AC 16/450/5/30    Renal/  #RAZIA   #ATN i.s.o hypotension  #metabolic acidosis   - f/u nephro   - trend BUN/Cr   - monitor Is and Os     #Upper GI Bleed   - s/p 5 units pRBC prior to MICU and then 4:2:2 on 7/12  - CT A/P 7/9 - Active bleeding in the third portion of the duodenum. Progression of liver metastases.  - EGD 7/9 showed esophagitis, One non-bleeding duodenal ulcer with a clean ulcer base (Arjun Class III). One oozing duodenal ulcer with spurting hemorrhage (Arjun Class Ia). Treatment not successful. Treated with bipolar cautery.  - s/p IR embolization on 7/9  - per GI recs, change PPI gtt to PPI IV BID  - monitor Hgb, transfuse as needed   - hold eliquis   - placed OG tube per GI and started ensure clear liquid (see note from RD 7/13/24)  - continue to f/u with GI and IR       #Diarrhea   - stopped maintenance fluids   - positive for c. diff (7/14)  - started vancomycin (7/14 -)     ID  #leukocytosis   -  BCx 7/10 - NGTD  - Sputum Cx from ETT 7/13 showed numerous gram neg krishna  - Start zosyn (7/11 - )   - resent GI panel (previously indeterminate norovirus)  - per ID- no intervention needed regarding the PCR results  - started vancomycin for c. diff (7/14- )    ENDOCRINE  #adrenal insufficiency   -7/5 cortisol 17   - 7/11 cortisol 37.1     HEMATOLOGY/ONCOLOGY   - neuroendocrine tumor   - was on lanreotide then had POD in liver and started on peptide receptor radiotherapy (PRRT)- typically given every 8 weeks for 4 doses. He last received it 10/20/2023   - PET 5/28/24 shows new somatostatin avid osseous lesions; decreased intensely avid right supradiaphragmatic and upper abdominal nodes, suspicious for mets  - per heme/onc, can resume octreotide once infection r/o     SKINS:   - Lines/Tubes - PIV, ETT, cordis (plan to replace with central line 7/14)    Ethics:   - Full Code   - [facility] 7/12, spoke to spouse (Yamilet) over phone with palliative care team Assessment: 77 yo male with metastatic neuroendocrine pancreatic cancer (tx on hold) and PMH of CAD s/p PCI x3 with most recent stent 6/12/24 on Plavix and eliquis , chronic Afib on eliquis, orthostatic hypotension on midodrine, PAD , recent admission for dx cath on 6/24/24 presented from PCP's office for hypotension despite taking AM midodrine dose on 7/2, admitted to medicine for symptomatic hypotension and RAZIA. Per chart, on 7/8 PM, pt was noted to have acute onset of melena x5 and coffee ground emesis x2-3. RRT was called on 7/9 AM for hypotension, hgb dropped from 9.2 to 7.4 (admission baseline 10-11), FOBT +, pt was started on PPI IV and transfused 1 unit of pRBC. GI was consulted and pt is scheduled for EGD on 7/9 afternoon. MICU was consulted for uncontrolled bleeding during EGD. During EGD, pt became hypotensive and was given phenylephrine, had A-fib with RVR s/p amiodarone. Now s/p IR embolization, admitted to MICU for further moniring i.s.o hemorrhagic shock 2/2 GI bleed     Plan:     Neuro   # Baseline AOx3  - intubated   - weaned off propofol, continue precedex  - on henny and vasopressin  - fentanyl prn for pain control (last dose 7/12/24)    CVS  # hemorrhagic shock   - RRT called 7/9 for hypotension   -  2/2 to Upper GI bleeds, urgently took to EGD, found bleeding ulcer that was not well controlled, underwent IR empiric embolization 7/9  - AM cortisol 17 (7/5), AM cortisol 37.1 (7/11)  - transfuse as needed (s/p 5 pRBC prior to MICU and now s/p 4 pRBC, 2 FFP, 2 platelets in MICU)  - restarted plavix 7/13/24 for new stent (6/12/24) per GI   - increased midodrine to 20 TID  - start droxidopa 100 q8   - on henny, maintain MAP > 65; took off vaso and precedex (7/13/24), he got tachy to 118. Added precedex back. vasopressin added 7/14  - per spouse, pt's baseline BP is a little bit over 100  - If able to extubate, increase midodrine to 30q8        #CAD s/p PCI x3, most recent stent 6/12/24, NURIA to pLAD   - c/w statin   - restarted plavix 7/13/24 for new stent (6/12/24) per GI     #PAD  # A-fib on eliquis    - s/p successful DCCV 10/31   - hold sotalol given hypotension 2/2 GI bleeds   - hold eliquis     PULM   #Intubated for airway protection prior to EGD/IR embolization  - Was on CPAP/PS  - Attempt extubation today    Renal/  #RAZIA   #ATN i.s.o hypotension  #metabolic acidosis   - f/u nephro   - trend BUN/Cr   - monitor Is and Os     GI  #Upper GI Bleed   - s/p 5 units pRBC prior to MICU and then 4:2:2 on 7/12  - CT A/P 7/9 - Active bleeding in the third portion of the duodenum. Progression of liver metastases.  - EGD 7/9 showed esophagitis, One non-bleeding duodenal ulcer with a clean ulcer base (Arjun Class III). One oozing duodenal ulcer with spurting hemorrhage (Arjun Class Ia). Treatment not successful. Treated with bipolar cautery.  - s/p IR embolization on 7/9  - per GI recs, change PPI gtt to PPI IV BID  - monitor Hgb, transfuse as needed   - hold eliquis   - placed OG tube per GI and started ensure clear liquid (see note from RD 7/13/24)  - Once extubated, remove OG tube  - Dysphagia screen if extubated to evaluate need for NG tube  - continue to f/u with GI and IR       #Diarrhea   - stopped maintenance fluids   - positive for c. diff (7/14)  - started vancomycin (7/14 -)     ID  #leukocytosis   -  BCx 7/10 - NGTD  - Sputum Cx from ETT 7/13 showed numerous gram neg krishna  - Start zosyn (7/11 - )   - resent GI panel (previously indeterminate norovirus)  - per ID- no intervention needed regarding the PCR results  - started vancomycin for c. diff (7/14- )    ENDOCRINE  #adrenal insufficiency   -7/5 cortisol 17   - 7/11 cortisol 37.1     HEMATOLOGY/ONCOLOGY   - neuroendocrine tumor   - was on lanreotide then had POD in liver and started on peptide receptor radiotherapy (PRRT)- typically given every 8 weeks for 4 doses. He last received it 10/20/2023   - PET 5/28/24 shows new somatostatin avid osseous lesions; decreased intensely avid right supradiaphragmatic and upper abdominal nodes, suspicious for mets  - per heme/onc, can resume octreotide once infection r/o     SKINS:   - Lines/Tubes - PIV, ETT, cordis (plan to replace with central line 7/14)    Ethics:   - Full Code   - [facility] 7/12, spoke to spouse (Yamilet) over phone with palliative care team Assessment: 79 yo male with metastatic neuroendocrine pancreatic cancer (tx on hold) and PMH of CAD s/p PCI x3 with most recent stent 6/12/24 on Plavix and eliquis , chronic Afib on eliquis, orthostatic hypotension on midodrine, PAD , recent admission for dx cath on 6/24/24 presented from PCP's office for hypotension despite taking AM midodrine dose on 7/2, admitted to medicine for symptomatic hypotension and RAZIA. Per chart, on 7/8 PM, pt was noted to have acute onset of melena x5 and coffee ground emesis x2-3. RRT was called on 7/9 AM for hypotension, hgb dropped from 9.2 to 7.4 (admission baseline 10-11), FOBT +, pt was started on PPI IV and transfused 1 unit of pRBC. GI was consulted and pt is scheduled for EGD on 7/9 afternoon. MICU was consulted for uncontrolled bleeding during EGD. During EGD, pt became hypotensive and was given phenylephrine, had A-fib with RVR s/p amiodarone. Now s/p IR embolization, admitted to MICU for further moniring i.s.o hemorrhagic shock 2/2 GI bleed     Plan:     Neuro   # Baseline AOx3  - intubated   - weaned off propofol, currently on precedex  - wean precedex as tolerated for extubation  - On henny and vasopressin  - Increase midodrine to 30 mg q8 to try to wean off pressors  - fentanyl prn for pain control (last dose 7/12/24)    CVS  # hemorrhagic shock   - RRT called 7/9 for hypotension   - 2/2 to Upper GI bleeds, urgently took to EGD, found bleeding ulcer that was not well controlled, underwent IR empiric embolization 7/9  - AM cortisol 17 (7/5), AM cortisol 37.1 (7/11)  - Transfuse as needed (s/p 5 pRBC prior to MICU and now s/p 4 pRBC, 2 FFP, 2 platelets in MICU)  - Restarted plavix 7/13/24 for new stent (6/12/24) per GI   - on henny, maintain MAP > 65; took off vaso and precedex (7/13/24), he got tachy to 118. Added precedex back. vasopressin added 7/14  - per spouse, pt's baseline BP is a little bit over 100  - Increased midodrine to 30mg q8  - Started droxidopa 200mg q8         #CAD s/p PCI x3, most recent stent 6/12/24, NURIA to pLAD   - c/w statin   - restarted plavix 7/13/24 for new stent (6/12/24) per GI     #PAD  # A-fib on eliquis    - s/p successful DCCV 10/31   - hold sotalol given hypotension 2/2 GI bleeds   - hold eliquis     PULM   #Intubated for airway protection prior to EGD/IR embolization  - Was on CPAP/PS  - Attempt extubation today    Renal/  #RAZIA   #ATN i.s.o hypotension  #metabolic acidosis   - f/u nephro   - trend BUN/Cr   - monitor Is and Os     #Urinary retention  - Started Doxazosin 2 mg daily 7/15    GI  #Upper GI Bleed   - s/p 5 units pRBC prior to MICU and then 4:2:2 on 7/12  - CT A/P 7/9 - Active bleeding in the third portion of the duodenum. Progression of liver metastases.  - EGD 7/9 showed esophagitis, One non-bleeding duodenal ulcer with a clean ulcer base (Arjun Class III). One oozing duodenal ulcer with spurting hemorrhage (Arjun Class Ia). Treatment not successful. Treated with bipolar cautery.  - s/p IR embolization on 7/9  - per GI recs, change PPI gtt to PPI IV BID  - monitor Hgb, transfuse as needed   - hold eliquis   - placed OG tube per GI and started ensure clear liquid (see note from RD 7/13/24)  - Once extubated, remove OG tube  - Dysphagia screen if extubated to evaluate need for NG tube  - continue to f/u with GI and IR       #Diarrhea   - stopped maintenance fluids   - positive for c. diff (7/14)  - started vancomycin (7/14 -)     ID  #leukocytosis   -  BCx 7/10 - NGTD  - Sputum Cx from ETT 7/13 showed numerous gram neg krishna  - Start zosyn (7/11 - )   - resent GI panel (previously indeterminate norovirus)  - per ID- no intervention needed regarding the PCR results  - started vancomycin for c. diff (7/14- )    ENDOCRINE  #adrenal insufficiency   -7/5 cortisol 17   - 7/11 cortisol 37.1     HEMATOLOGY/ONCOLOGY   - neuroendocrine tumor   - was on lanreotide then had POD in liver and started on peptide receptor radiotherapy (PRRT)- typically given every 8 weeks for 4 doses. He last received it 10/20/2023   - PET 5/28/24 shows new somatostatin avid osseous lesions; decreased intensely avid right supradiaphragmatic and upper abdominal nodes, suspicious for mets  - per heme/onc, can resume octreotide once infection r/o     SKINS:   - Lines/Tubes - PIV, ETT, cordis (plan to replace with central line 7/14)    Ethics:   - Full Code   - [facility] 7/12, spoke to spouse (Yamilet) over phone with palliative care team

## 2024-07-15 NOTE — PROGRESS NOTE ADULT - ASSESSMENT
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, PAD, and orthostatic hypotension on midodrine presenting for lightheadedness He was admitted for initial complaint of lightheadedness and low bp despite midodrine, now with melena and coffee ground emesis and acute blood loss anemia, found to hemorrhagic shock due to duodenal bleed s/p GDA embolization.    Impression:  #Hemorrhagic Shock  #Acute on chronic blood loss anemia  #Duodenal bleed s/p GDA embolization  #LA Grade D Esophagitis  #C-diff infection  #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 Found to have active bleed in second portion of duodenum, unable to identify source underneath large clot on EGD so underwent empiric embolization of GDA by IR for bleeding control. Hgb started dropping after starting cangrelor with worsening renal and liver failure. Repeat EGD 7/12 showed grade D esophagitis, organized clots with extensive duodenal ulcers (some with pigmented spots) w/o active bleeding, treated with purastat. Hgb stable since repeat scope    Recommendations:  - Continue pantoprazole IV BID  - Transfuse for Hgb<8, trend cbc and INR daily, maintain active T&S  - If any further melena or further drop in Hgb, please message GI team  - No absolute contraindication to restarting plavix  - Vancomycin PO for c-diff infection  - Respiratory and circulatory support per ICU team  - Appreciate palliative care involvement given guarded prognosis  - Will need PPI BID for 8 weeks for grade D esophagitis and PUD    We will sign off at this time, however please call back with questions or should any worsening/persistent issues arise. If patient develops further melena or signs of GI bleeding, please re-consult. Please provide patient with Gastroenterology Clinic to confirm appointment; [phone] ([facility] at [address] [facility]) or [phone] ([facility] at [address] [place] [address]) or [phone] ([facility] at [address] [place] [address]).    All recommendations are tentative until note is attested by attending.     Bailey Choudhury, PGY4  Gastroenterology/Hepatology Fellow  Available on Microsoft Teams  [phone] (Long Range Pager)  [phone] (Short Range Pager [facility])    After 5 pm, please contact the on-call GI fellow for any urgent issues via the Hospital Call

## 2024-07-15 NOTE — PROGRESS NOTE ADULT - SUBJECTIVE AND OBJECTIVE BOX
Interval Events:   - No further melena since EGD. Hgb remains stable.   - Diarrhea noted on Saturday and patient found to have c-diff. Started on po vancomycin. Diarrhea improving.  - Remains intubated and on pressors for hypotension.     ROS:   Unable to perform due to [facility]    Hospital Medications:  acetaminophen     Tablet .. 650 milliGRAM(s) Oral every 6 hours PRN  atorvastatin 80 milliGRAM(s) Oral at bedtime  chlorhexidine 2% Cloths 1 Application(s) Topical <User Schedule>  clopidogrel Tablet 75 milliGRAM(s) Enteral Tube daily  dexMEDEtomidine Infusion 0.3 MICROgram(s)/kG/Hr IV Continuous <Continuous>  dextrose 5%. 1000 milliLiter(s) IV Continuous <Continuous>  dextrose 5%. 1000 milliLiter(s) IV Continuous <Continuous>  dextrose 50% Injectable 25 Gram(s) IV Push once  dextrose 50% Injectable 12.5 Gram(s) IV Push once  dextrose 50% Injectable 25 Gram(s) IV Push once  dextrose Oral Gel 15 Gram(s) Oral once PRN  droxidopa 100 milliGRAM(s) Oral three times a day  fentaNYL    Injectable 50 MICROGram(s) IV Push every 4 hours PRN  folic acid Injectable 1 milliGRAM(s) IV Push daily  glucagon  Injectable 1 milliGRAM(s) IntraMuscular once  insulin lispro (ADMELOG) corrective regimen sliding scale   SubCutaneous every 6 hours  midodrine 20 milliGRAM(s) Oral every 8 hours  norepinephrine Infusion 0.05 MICROgram(s)/kG/Min IV Continuous <Continuous>  pantoprazole  Injectable 40 milliGRAM(s) IV Push every 12 hours  phenylephrine    Infusion 0.25 MICROgram(s)/kG/Min IV Continuous <Continuous>  piperacillin/tazobactam IVPB.. 3.375 Gram(s) IV Intermittent every 8 hours  thiamine Injectable 100 milliGRAM(s) IV Push daily  vancomycin    Solution 125 milliGRAM(s) Oral every 6 hours  vasopressin Infusion 0.04 Unit(s)/Min IV Continuous <Continuous>      PHYSICAL EXAM:   Vital Signs:  Vital Signs Last 24 Hrs  T(C): 37.1 (15 Jul 2024 12:00), Max: 38.8 (14 Jul 2024 17:30)  T(F): 98.8 (15 Jul 2024 12:00), Max: 101.8 (14 Jul 2024 17:30)  HR: 86 (15 Jul 2024 13:00) (71 - 121)  BP: 122/62 (15 Jul 2024 13:00) (75/54 - 144/64)  BP(mean): 85 (15 Jul 2024 13:00) (59 - 100)  RR: 18 (15 Jul 2024 13:00) (15 - 36)  SpO2: 100% (15 Jul 2024 13:00) (89% - 100%)    Parameters below as of 15 Jul 2024 09:59      O2 Concentration (%): 30  Daily     Daily     GENERAL: intubated, sedated  HEENT: NCAT, no conjunctival pallor appreciated  ABDOMEN: soft, moderately distended, tender to palpation  EXTREMITIES: cold, poorly perfused  SKIN: no lesions noted    LABS: reviewed                        13.0   9.95  )-----------( 90       ( 15 Jul 2024 11:50 )             38.4     07-15    142  |  111<H>  |  72<H>  ----------------------------<  237<H>  3.8   |  15<L>  |  1.61<H>    Ca    7.5<L>      15 Jul 2024 11:50  Phos  4.3     07-15  Mg     1.8     07-15    TPro  4.7<L>  /  Alb  2.0<L>  /  TBili  1.4<H>  /  DBili  x   /  AST  103<H>  /  ALT  96<H>  /  AlkPhos  209<H>  07-15    LIVER FUNCTIONS - ( 15 Jul 2024 11:50 )  Alb: 2.0 g/dL / Pro: 4.7 g/dL / ALK PHOS: 209 U/L / ALT: 96 U/L / AST: 103 U/L / GGT: x             Interval Diagnostic Studies: see sunrise for full report

## 2024-07-16 NOTE — PROGRESS NOTE ADULT - SUBJECTIVE AND OBJECTIVE BOX
Jodie Okeefe MD PGY-1  ---------------------------------------------------------------------------------------------  Patient is a 78y old  Male who presents with a chief complaint of hypotension (15 Jul 2024 20:52)      HPI:  77 yo male with PMH of CAD s/p PCI x3 with most recent stent 6/12/24 on Plavix, chronic Afib on eliquis, orthostatic hypotension on midodrine, PAD, 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 Patient states since discharge on this past Saturday, he continued to feel ongoing generalized weakness and dizziness with positional changes despite compliance with home midodrine 15mg TID and holding torsemide as instructed. Admits to poor PO intake of fluids/solute due to ongoing issues with poor appetite and nausea with meals which is not new. Denies any fever, chills, chest pain, SOB, cough, abd pain, dysuria nor urinary frequency. Has chronic diarrhea that is unchanged from his baseline.     In the ED, vitals notable for SBP 92-11s. Labs notable for elevated BUN/Cr 31/1.59 (from 30/1.18 on day of discharge 6/29/24). CXR with clear lungs. Abd US with innumerable intrahepatic echogenic masses consistent with known metastatic neuroendocrine tumor. Admitted to medicine for symptomatic hypotension and RAZIA. (02 Jul 2024 18:34)      SUBJECTIVE / OVERNIGHT EVENTS: ***      MEDICATIONS  (STANDING):  atorvastatin 80 milliGRAM(s) Oral at bedtime  chlorhexidine 2% Cloths 1 Application(s) Topical <User Schedule>  clopidogrel Tablet 75 milliGRAM(s) Enteral Tube daily  doxazosin 2 milliGRAM(s) Oral at bedtime  droxidopa 200 milliGRAM(s) Oral every 8 hours  folic acid Injectable 1 milliGRAM(s) IV Push daily  glucagon  Injectable 1 milliGRAM(s) IntraMuscular once  insulin lispro (ADMELOG) corrective regimen sliding scale   SubCutaneous every 6 hours  magnesium sulfate  IVPB 1 Gram(s) IV Intermittent once  midodrine 30 milliGRAM(s) Oral every 8 hours  pantoprazole  Injectable 40 milliGRAM(s) IV Push every 12 hours  phenylephrine    Infusion 0.25 MICROgram(s)/kG/Min (9.25 mL/Hr) IV Continuous <Continuous>  piperacillin/tazobactam IVPB.. 3.375 Gram(s) IV Intermittent every 8 hours  potassium chloride   Powder 40 milliEquivalent(s) Oral once  sodium bicarbonate 650 milliGRAM(s) Oral three times a day  thiamine Injectable 100 milliGRAM(s) IV Push daily  vancomycin    Solution 125 milliGRAM(s) Oral every 6 hours  vasopressin Infusion 0.04 Unit(s)/Min (6 mL/Hr) IV Continuous <Continuous>    MEDICATIONS  (PRN):  acetaminophen     Tablet .. 650 milliGRAM(s) Oral every 6 hours PRN Temp greater or equal to 38C (100.4F)  fentaNYL    Injectable 50 MICROGram(s) IV Push every 4 hours PRN Severe Pain (7 - 10)    Allergies    No Known Allergies    Intolerances        ICU Vital Signs Last 24 Hrs  T(F): 99 (16 Jul 2024 00:00), Max: 99.3 (15 Jul 2024 16:00)  HR: 130 (16 Jul 2024 01:00) (71 - 133)  BP: 87/67 (16 Jul 2024 01:00) (75/54 - 144/73)  BP(mean): 74 (16 Jul 2024 01:00) (59 - 100)  ABP: 87/50 (15 Jul 2024 08:45) (87/50 - 102/59)  ABP(mean): 64 (15 Jul 2024 08:45) (64 - 75)  RR: 32 (16 Jul 2024 01:00) (15 - 37)  SpO2: 99% (16 Jul 2024 01:00) (95% - 100%)    Mode: CPAP with PS  FiO2: 30  PEEP: 5  PS: 5  MAP: 7  PIP: 12    Physical Exam:   GENERAL: NAD, lying in bed comfortably  HEAD:  Atraumatic, Normocephalic  EYES: EOMI, PERRLA, conjunctiva and sclera clear  ENT: Moist mucous membranes  NECK: Supple, No JVD  CHEST/LUNG: Clear to auscultation bilaterally; No rales, rhonchi, wheezing, or rubs. Unlabored respirations  HEART: Regular rate and rhythm; Normal S1/S2. No murmurs, rubs, or gallops  ABDOMEN: Bowel sounds present; Soft, Nontender, Nondistended. No hepatomegaly  EXTREMITIES:  2+ Peripheral Pulses, brisk capillary refill. No clubbing, cyanosis, or edema  NERVOUS SYSTEM:  Alert & Oriented X3, speech clear. No deficits.  MSK: FROM all 4 extremities, full and equal strength  SKIN: No rashes or lesions    I&O's Summary    14 Jul 2024 07:01  -  15 Jul 2024 07:00  --------------------------------------------------------  IN: 3482.8 mL / OUT: 710 mL / NET: 2772.8 mL    15 Jul 2024 07:01  -  16 Jul 2024 04:34  --------------------------------------------------------  IN: 716.8 mL / OUT: 815 mL / NET: -98.2 mL        LABS:                        11.4   11.84 )-----------( 89       ( 16 Jul 2024 02:16 )             33.5     07-16    146<H>  |  112<H>  |  72<H>  ----------------------------<  148<H>  3.3<L>   |  17<L>  |  1.66<H>    Ca    7.4<L>      16 Jul 2024 02:16  Phos  3.6     07-16  Mg     1.8     07-16    TPro  4.5<L>  /  Alb  2.0<L>  /  TBili  1.1  /  DBili  x   /  AST  87<H>  /  ALT  72<H>  /  AlkPhos  197<H>  07-16    PT/INR - ( 16 Jul 2024 02:16 )   PT: 12.0 sec;   INR: 1.09 ratio         PTT - ( 16 Jul 2024 02:16 )  PTT:29.7 sec  ABG - ( 15 Jul 2024 11:47 )  pH, Arterial: 7.39  pH, Blood: x     /  pCO2: 27    /  pO2: 114   / HCO3: 16    / Base Excess: -7.1  /  SaO2: 99.3                Venous: 07-16-24 @ 02:04 FiO2: 21 Oxygen Sat% 89.8    Urinalysis Basic - ( 16 Jul 2024 02:16 )    Color: x / Appearance: x / SG: x / pH: x  Gluc: 148 mg/dL / Ketone: x  / Bili: x / Urobili: x   Blood: x / Protein: x / Nitrite: x   Leuk Esterase: x / RBC: x / WBC x   Sq Epi: x / Non Sq Epi: x / Bacteria: x      CARDIAC MARKERS ( 15 Jul 2024 00:33 )  x     / x     / 558 U/L / x     / x          CAPILLARY BLOOD GLUCOSE      POCT Blood Glucose.: 136 mg/dL (16 Jul 2024 01:02)  POCT Blood Glucose.: 196 mg/dL (15 Jul 2024 17:27)      RADIOLOGY & ADDITIONAL TESTS:  Results Reviewed:   Imaging Personally Reviewed:  Electrocardiogram Personally Reviewed: Jodie Okeefe MD PGY-1  ---------------------------------------------------------------------------------------------  Patient is a 78y old  Male who presents with a chief complaint of hypotension (15 Jul 2024 20:52)      HPI:  77 yo male with PMH of CAD s/p PCI x3 with most recent stent 6/12/24 on Plavix, chronic Afib on eliquis, orthostatic hypotension on midodrine, PAD, 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 Patient states since discharge on this past Saturday, he continued to feel ongoing generalized weakness and dizziness with positional changes despite compliance with home midodrine 15mg TID and holding torsemide as instructed. Admits to poor PO intake of fluids/solute due to ongoing issues with poor appetite and nausea with meals which is not new. Denies any fever, chills, chest pain, SOB, cough, abd pain, dysuria nor urinary frequency. Has chronic diarrhea that is unchanged from his baseline.     In the ED, vitals notable for SBP 92-11s. Labs notable for elevated BUN/Cr 31/1.59 (from 30/1.18 on day of discharge 6/29/24). CXR with clear lungs. Abd US with innumerable intrahepatic echogenic masses consistent with known metastatic neuroendocrine tumor. Admitted to medicine for symptomatic hypotension and RAZIA. (02 Jul 2024 18:34)      SUBJECTIVE / OVERNIGHT EVENTS:  Overnight patient had NG tube placed. He also went into RVR with rates into 150s. He received amio 150 x2. His levo was switched to henny. Per nephro, also added bicarb 650 tid.    Patient smiling this morning and able to answer questions. Patient denies pain or SOB, but conversation limited by cough. Patient is bothered by phlegm.    Gtt 0.04 vaso, 2.5 phenylephrine  NG tube in place  Ivory in place      MEDICATIONS  (STANDING):  atorvastatin 80 milliGRAM(s) Oral at bedtime  chlorhexidine 2% Cloths 1 Application(s) Topical <User Schedule>  clopidogrel Tablet 75 milliGRAM(s) Enteral Tube daily  doxazosin 2 milliGRAM(s) Oral at bedtime  droxidopa 200 milliGRAM(s) Oral every 8 hours  folic acid Injectable 1 milliGRAM(s) IV Push daily  glucagon  Injectable 1 milliGRAM(s) IntraMuscular once  insulin lispro (ADMELOG) corrective regimen sliding scale   SubCutaneous every 6 hours  magnesium sulfate  IVPB 1 Gram(s) IV Intermittent once  midodrine 30 milliGRAM(s) Oral every 8 hours  pantoprazole  Injectable 40 milliGRAM(s) IV Push every 12 hours  phenylephrine    Infusion 0.25 MICROgram(s)/kG/Min (9.25 mL/Hr) IV Continuous <Continuous>  piperacillin/tazobactam IVPB.. 3.375 Gram(s) IV Intermittent every 8 hours  potassium chloride   Powder 40 milliEquivalent(s) Oral once  sodium bicarbonate 650 milliGRAM(s) Oral three times a day  thiamine Injectable 100 milliGRAM(s) IV Push daily  vancomycin    Solution 125 milliGRAM(s) Oral every 6 hours  vasopressin Infusion 0.04 Unit(s)/Min (6 mL/Hr) IV Continuous <Continuous>    MEDICATIONS  (PRN):  acetaminophen     Tablet .. 650 milliGRAM(s) Oral every 6 hours PRN Temp greater or equal to 38C (100.4F)  fentaNYL    Injectable 50 MICROGram(s) IV Push every 4 hours PRN Severe Pain (7 - 10)    Allergies    No Known Allergies    Intolerances        ICU Vital Signs Last 24 Hrs  T(F): 99 (16 Jul 2024 00:00), Max: 99.3 (15 Jul 2024 16:00)  HR: 130 (16 Jul 2024 01:00) (71 - 133)  BP: 87/67 (16 Jul 2024 01:00) (75/54 - 144/73)  BP(mean): 74 (16 Jul 2024 01:00) (59 - 100)  ABP: 87/50 (15 Jul 2024 08:45) (87/50 - 102/59)  ABP(mean): 64 (15 Jul 2024 08:45) (64 - 75)  RR: 32 (16 Jul 2024 01:00) (15 - 37)  SpO2: 99% (16 Jul 2024 01:00) (95% - 100%)    Mode: CPAP with PS  FiO2: 30  PEEP: 5  PS: 5  MAP: 7  PIP: 12    Physical Exam:   GENERAL: NAD, lying in bed comfortably, NG tube in place  HEAD:  Atraumatic, Normocephalic  EYES: EOMI, PERRLA, conjunctiva and sclera clear  ENT: Moist mucous membranes  NECK: No JVD  CHEST/LUNG: Clear to auscultation bilaterally; No rales, rhonchi, wheezing, or rubs. Unlabored respirations  HEART: Tachycardic, no murmurs  ABDOMEN: Bowel sounds present; Soft, Nontender, Nondistended  : Ivory in place  EXTREMITIES: Legs covered, some swelling, but no pitting edema  NERVOUS SYSTEM:  Alert & Oriented X3, speech clear. No deficits.  SKIN: No rashes or lesions    I&O's Summary    14 Jul 2024 07:01  -  15 Jul 2024 07:00  --------------------------------------------------------  IN: 3482.8 mL / OUT: 710 mL / NET: 2772.8 mL    15 Jul 2024 07:01  -  16 Jul 2024 04:34  --------------------------------------------------------  IN: 716.8 mL / OUT: 815 mL / NET: -98.2 mL        LABS:                        11.4   11.84 )-----------( 89       ( 16 Jul 2024 02:16 )             33.5     07-16    146<H>  |  112<H>  |  72<H>  ----------------------------<  148<H>  3.3<L>   |  17<L>  |  1.66<H>    Ca    7.4<L>      16 Jul 2024 02:16  Phos  3.6     07-16  Mg     1.8     07-16    TPro  4.5<L>  /  Alb  2.0<L>  /  TBili  1.1  /  DBili  x   /  AST  87<H>  /  ALT  72<H>  /  AlkPhos  197<H>  07-16    PT/INR - ( 16 Jul 2024 02:16 )   PT: 12.0 sec;   INR: 1.09 ratio         PTT - ( 16 Jul 2024 02:16 )  PTT:29.7 sec  ABG - ( 15 Jul 2024 11:47 )  pH, Arterial: 7.39  pH, Blood: x     /  pCO2: 27    /  pO2: 114   / HCO3: 16    / Base Excess: -7.1  /  SaO2: 99.3                Venous: 07-16-24 @ 02:04 FiO2: 21 Oxygen Sat% 89.8    Urinalysis Basic - ( 16 Jul 2024 02:16 )    Color: x / Appearance: x / SG: x / pH: x  Gluc: 148 mg/dL / Ketone: x  / Bili: x / Urobili: x   Blood: x / Protein: x / Nitrite: x   Leuk Esterase: x / RBC: x / WBC x   Sq Epi: x / Non Sq Epi: x / Bacteria: x      CARDIAC MARKERS ( 15 Jul 2024 00:33 )  x     / x     / 558 U/L / x     / x          CAPILLARY BLOOD GLUCOSE      POCT Blood Glucose.: 136 mg/dL (16 Jul 2024 01:02)  POCT Blood Glucose.: 196 mg/dL (15 Jul 2024 17:27)      RADIOLOGY & ADDITIONAL TESTS:  Results Reviewed:   Imaging Personally Reviewed:  Electrocardiogram Personally Reviewed: Jodie Okeefe MD PGY-1  ---------------------------------------------------------------------------------------------  Patient is a 78y old  Male who presents with a chief complaint of hypotension (15 Jul 2024 20:52)      HPI:  77 yo male with PMH of CAD s/p PCI x3 with most recent stent 6/12/24 on Plavix, chronic Afib on eliquis, orthostatic hypotension on midodrine, PAD, 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 Patient states since discharge on this past Saturday, he continued to feel ongoing generalized weakness and dizziness with positional changes despite compliance with home midodrine 15mg TID and holding torsemide as instructed. Admits to poor PO intake of fluids/solute due to ongoing issues with poor appetite and nausea with meals which is not new. Denies any fever, chills, chest pain, SOB, cough, abd pain, dysuria nor urinary frequency. Has chronic diarrhea that is unchanged from his baseline.     In the ED, vitals notable for SBP 92-11s. Labs notable for elevated BUN/Cr 31/1.59 (from 30/1.18 on day of discharge 6/29/24). CXR with clear lungs. Abd US with innumerable intrahepatic echogenic masses consistent with known metastatic neuroendocrine tumor. Admitted to medicine for symptomatic hypotension and RAZIA. (02 Jul 2024 18:34)      SUBJECTIVE / OVERNIGHT EVENTS:  Overnight patient had NG tube placed. He also went into RVR with rates into 150s. He received amio 150 x2. His levo was switched to henny. Per nephro, also added bicarb 650 tid. There was concern for hypovolemia (IVC 1.3 on US), and he was given albumin.    On examination, patient smiling this morning and able to answer questions. Patient denies pain or SOB, but conversation limited by cough. Patient is bothered by phlegm.    Gtt 0.04 vaso, 2.5 phenylephrine  NG tube in place  Ivory in place      MEDICATIONS  (STANDING):  atorvastatin 80 milliGRAM(s) Oral at bedtime  chlorhexidine 2% Cloths 1 Application(s) Topical <User Schedule>  clopidogrel Tablet 75 milliGRAM(s) Enteral Tube daily  doxazosin 2 milliGRAM(s) Oral at bedtime  droxidopa 200 milliGRAM(s) Oral every 8 hours  folic acid Injectable 1 milliGRAM(s) IV Push daily  glucagon  Injectable 1 milliGRAM(s) IntraMuscular once  insulin lispro (ADMELOG) corrective regimen sliding scale   SubCutaneous every 6 hours  magnesium sulfate  IVPB 1 Gram(s) IV Intermittent once  midodrine 30 milliGRAM(s) Oral every 8 hours  pantoprazole  Injectable 40 milliGRAM(s) IV Push every 12 hours  phenylephrine    Infusion 0.25 MICROgram(s)/kG/Min (9.25 mL/Hr) IV Continuous <Continuous>  piperacillin/tazobactam IVPB.. 3.375 Gram(s) IV Intermittent every 8 hours  potassium chloride   Powder 40 milliEquivalent(s) Oral once  sodium bicarbonate 650 milliGRAM(s) Oral three times a day  thiamine Injectable 100 milliGRAM(s) IV Push daily  vancomycin    Solution 125 milliGRAM(s) Oral every 6 hours  vasopressin Infusion 0.04 Unit(s)/Min (6 mL/Hr) IV Continuous <Continuous>    MEDICATIONS  (PRN):  acetaminophen     Tablet .. 650 milliGRAM(s) Oral every 6 hours PRN Temp greater or equal to 38C (100.4F)  fentaNYL    Injectable 50 MICROGram(s) IV Push every 4 hours PRN Severe Pain (7 - 10)    Allergies    No Known Allergies    Intolerances        ICU Vital Signs Last 24 Hrs  T(F): 99 (16 Jul 2024 00:00), Max: 99.3 (15 Jul 2024 16:00)  HR: 130 (16 Jul 2024 01:00) (71 - 133)  BP: 87/67 (16 Jul 2024 01:00) (75/54 - 144/73)  BP(mean): 74 (16 Jul 2024 01:00) (59 - 100)  ABP: 87/50 (15 Jul 2024 08:45) (87/50 - 102/59)  ABP(mean): 64 (15 Jul 2024 08:45) (64 - 75)  RR: 32 (16 Jul 2024 01:00) (15 - 37)  SpO2: 99% (16 Jul 2024 01:00) (95% - 100%)    Mode: CPAP with PS  FiO2: 30  PEEP: 5  PS: 5  MAP: 7  PIP: 12    Physical Exam:   GENERAL: NAD, lying in bed comfortably, NG tube in place  HEAD:  Atraumatic, Normocephalic  EYES: EOMI, PERRLA, conjunctiva and sclera clear  ENT: Moist mucous membranes  NECK: No JVD  CHEST/LUNG: Clear to auscultation bilaterally; No rales, rhonchi, wheezing, or rubs. Unlabored respirations  HEART: Tachycardic, no murmurs  ABDOMEN: Bowel sounds present; Soft, Nontender, Nondistended  : Ivory in place  EXTREMITIES: Legs covered, some swelling, but no pitting edema  NERVOUS SYSTEM:  Alert & Oriented X3, speech clear. No deficits.  SKIN: No rashes or lesions    I&O's Summary    14 Jul 2024 07:01  -  15 Jul 2024 07:00  --------------------------------------------------------  IN: 3482.8 mL / OUT: 710 mL / NET: 2772.8 mL    15 Jul 2024 07:01  -  16 Jul 2024 04:34  --------------------------------------------------------  IN: 716.8 mL / OUT: 815 mL / NET: -98.2 mL        LABS:                        11.4   11.84 )-----------( 89       ( 16 Jul 2024 02:16 )             33.5     07-16    146<H>  |  112<H>  |  72<H>  ----------------------------<  148<H>  3.3<L>   |  17<L>  |  1.66<H>    Ca    7.4<L>      16 Jul 2024 02:16  Phos  3.6     07-16  Mg     1.8     07-16    TPro  4.5<L>  /  Alb  2.0<L>  /  TBili  1.1  /  DBili  x   /  AST  87<H>  /  ALT  72<H>  /  AlkPhos  197<H>  07-16    PT/INR - ( 16 Jul 2024 02:16 )   PT: 12.0 sec;   INR: 1.09 ratio         PTT - ( 16 Jul 2024 02:16 )  PTT:29.7 sec  ABG - ( 15 Jul 2024 11:47 )  pH, Arterial: 7.39  pH, Blood: x     /  pCO2: 27    /  pO2: 114   / HCO3: 16    / Base Excess: -7.1  /  SaO2: 99.3                Venous: 07-16-24 @ 02:04 FiO2: 21 Oxygen Sat% 89.8    Urinalysis Basic - ( 16 Jul 2024 02:16 )    Color: x / Appearance: x / SG: x / pH: x  Gluc: 148 mg/dL / Ketone: x  / Bili: x / Urobili: x   Blood: x / Protein: x / Nitrite: x   Leuk Esterase: x / RBC: x / WBC x   Sq Epi: x / Non Sq Epi: x / Bacteria: x      CARDIAC MARKERS ( 15 Jul 2024 00:33 )  x     / x     / 558 U/L / x     / x          CAPILLARY BLOOD GLUCOSE      POCT Blood Glucose.: 136 mg/dL (16 Jul 2024 01:02)  POCT Blood Glucose.: 196 mg/dL (15 Jul 2024 17:27)      RADIOLOGY & ADDITIONAL TESTS:  Results Reviewed:   Imaging Personally Reviewed:  Electrocardiogram Personally Reviewed: Jodie Okeefe MD PGY-1  ---------------------------------------------------------------------------------------------  Patient is a 78y old  Male who presents with a chief complaint of hypotension (15 Jul 2024 20:52)      HPI:  77 yo male with PMH of CAD s/p PCI x3 with most recent stent 6/12/24 on Plavix, chronic Afib on eliquis, orthostatic hypotension on midodrine, PAD, 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 Patient states since discharge on this past Saturday, he continued to feel ongoing generalized weakness and dizziness with positional changes despite compliance with home midodrine 15mg TID and holding torsemide as instructed. Admits to poor PO intake of fluids/solute due to ongoing issues with poor appetite and nausea with meals which is not new. Denies any fever, chills, chest pain, SOB, cough, abd pain, dysuria nor urinary frequency. Has chronic diarrhea that is unchanged from his baseline.     In the ED, vitals notable for SBP 92-11s. Labs notable for elevated BUN/Cr 31/1.59 (from 30/1.18 on day of discharge 6/29/24). CXR with clear lungs. Abd US with innumerable intrahepatic echogenic masses consistent with known metastatic neuroendocrine tumor. Admitted to medicine for symptomatic hypotension and RAZIA. (02 Jul 2024 18:34)      SUBJECTIVE / OVERNIGHT EVENTS:  Overnight patient had NG tube placed. He also went into RVR with rates into 150s. He received amio 150 x2. His levo was switched to henny. There was concern for hypovolemia (IVC 1.3 on US), and he was given albumin.  Per nephro, also added bicarb 650 tid.    On examination, patient smiling this morning and able to answer questions. Patient denies pain or SOB, but conversation limited by cough. Patient is bothered by phlegm.    Other:  Gtt 0.04 vaso, 2.5 phenylephrine  NG tube in place  Ivory in place    MEDICATIONS  (STANDING):  atorvastatin 80 milliGRAM(s) Oral at bedtime  chlorhexidine 2% Cloths 1 Application(s) Topical <User Schedule>  clopidogrel Tablet 75 milliGRAM(s) Enteral Tube daily  doxazosin 2 milliGRAM(s) Oral at bedtime  droxidopa 200 milliGRAM(s) Oral every 8 hours  folic acid Injectable 1 milliGRAM(s) IV Push daily  glucagon  Injectable 1 milliGRAM(s) IntraMuscular once  insulin lispro (ADMELOG) corrective regimen sliding scale   SubCutaneous every 6 hours  magnesium sulfate  IVPB 1 Gram(s) IV Intermittent once  midodrine 30 milliGRAM(s) Oral every 8 hours  pantoprazole  Injectable 40 milliGRAM(s) IV Push every 12 hours  phenylephrine    Infusion 0.25 MICROgram(s)/kG/Min (9.25 mL/Hr) IV Continuous <Continuous>  piperacillin/tazobactam IVPB.. 3.375 Gram(s) IV Intermittent every 8 hours  potassium chloride   Powder 40 milliEquivalent(s) Oral once  sodium bicarbonate 650 milliGRAM(s) Oral three times a day  thiamine Injectable 100 milliGRAM(s) IV Push daily  vancomycin    Solution 125 milliGRAM(s) Oral every 6 hours  vasopressin Infusion 0.04 Unit(s)/Min (6 mL/Hr) IV Continuous <Continuous>    MEDICATIONS  (PRN):  acetaminophen     Tablet .. 650 milliGRAM(s) Oral every 6 hours PRN Temp greater or equal to 38C (100.4F)  fentaNYL    Injectable 50 MICROGram(s) IV Push every 4 hours PRN Severe Pain (7 - 10)    Allergies    No Known Allergies    Intolerances    ICU Vital Signs Last 24 Hrs  T(F): 99 (16 Jul 2024 00:00), Max: 99.3 (15 Jul 2024 16:00)  HR: 130 (16 Jul 2024 01:00) (71 - 133)  BP: 87/67 (16 Jul 2024 01:00) (75/54 - 144/73)  BP(mean): 74 (16 Jul 2024 01:00) (59 - 100)  ABP: 87/50 (15 Jul 2024 08:45) (87/50 - 102/59)  ABP(mean): 64 (15 Jul 2024 08:45) (64 - 75)  RR: 32 (16 Jul 2024 01:00) (15 - 37)  SpO2: 99% (16 Jul 2024 01:00) (95% - 100%)    Physical Exam:   GENERAL: NAD, lying in bed comfortably, NG tube in place  HEAD:  Atraumatic, Normocephalic  EYES: EOMI, PERRLA, conjunctiva and sclera clear  NECK: No JVD  CHEST/LUNG: Cough, Clear to auscultation bilaterally; No rales, rhonchi, wheezing, or rubs. Unlabored respirations  HEART: Tachycardic, no murmurs  ABDOMEN: Bowel sounds present; Soft, Nontender, Nondistended  : Ivory in place  EXTREMITIES: Legs covered, some swelling, but no pitting edema  NERVOUS SYSTEM:  Alert & Oriented X3, speech clear. No deficits.  SKIN: No rashes or lesions    I&O's Summary    14 Jul 2024 07:01  -  15 Jul 2024 07:00  --------------------------------------------------------  IN: 3482.8 mL / OUT: 710 mL / NET: 2772.8 mL    15 Jul 2024 07:01  -  16 Jul 2024 04:34  --------------------------------------------------------  IN: 716.8 mL / OUT: 815 mL / NET: -98.2 mL        LABS:                        11.4   11.84 )-----------( 89       ( 16 Jul 2024 02:16 )             33.5     07-16    146<H>  |  112<H>  |  72<H>  ----------------------------<  148<H>  3.3<L>   |  17<L>  |  1.66<H>    Ca    7.4<L>      16 Jul 2024 02:16  Phos  3.6     07-16  Mg     1.8     07-16    TPro  4.5<L>  /  Alb  2.0<L>  /  TBili  1.1  /  DBili  x   /  AST  87<H>  /  ALT  72<H>  /  AlkPhos  197<H>  07-16    PT/INR - ( 16 Jul 2024 02:16 )   PT: 12.0 sec;   INR: 1.09 ratio         PTT - ( 16 Jul 2024 02:16 )  PTT:29.7 sec  ABG - ( 15 Jul 2024 11:47 )  pH, Arterial: 7.39  pH, Blood: x     /  pCO2: 27    /  pO2: 114   / HCO3: 16    / Base Excess: -7.1  /  SaO2: 99.3      Venous: 07-16-24 @ 02:04 FiO2: 21 Oxygen Sat% 89.8    CARDIAC MARKERS ( 15 Jul 2024 00:33 )  x     / x     / 558 U/L / x     / x          CAPILLARY BLOOD GLUCOSE      POCT Blood Glucose.: 136 mg/dL (16 Jul 2024 01:02)  POCT Blood Glucose.: 196 mg/dL (15 Jul 2024 17:27)   Jodie Okeefe MD PGY-1  ---------------------------------------------------------------------------------------------  Patient is a 78y old  Male who presents with a chief complaint of hypotension (15 Jul 2024 20:52)      HPI:  79 yo male with PMH of CAD s/p PCI x3 with most recent stent 6/12/24 on Plavix, chronic Afib on eliquis, orthostatic hypotension on midodrine, PAD, 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 Patient states since discharge on this past Saturday, he continued to feel ongoing generalized weakness and dizziness with positional changes despite compliance with home midodrine 15mg TID and holding torsemide as instructed. Admits to poor PO intake of fluids/solute due to ongoing issues with poor appetite and nausea with meals which is not new. Denies any fever, chills, chest pain, SOB, cough, abd pain, dysuria nor urinary frequency. Has chronic diarrhea that is unchanged from his baseline.     In the ED, vitals notable for SBP 92-11s. Labs notable for elevated BUN/Cr 31/1.59 (from 30/1.18 on day of discharge 6/29/24). CXR with clear lungs. Abd US with innumerable intrahepatic echogenic masses consistent with known metastatic neuroendocrine tumor. Admitted to medicine for symptomatic hypotension and RAZIA. (02 Jul 2024 18:34)    SUBJECTIVE / OVERNIGHT EVENTS:  Overnight patient had NG tube placed. He also went into RVR with rates into 150s. He received amio 150 x2. His levo was switched to henny. There was concern for hypovolemia (IVC 1.3 on US), and he was given albumin.  Per nephro, also added bicarb 650 tid.    On examination, patient smiling this morning and able to answer questions. Patient denies pain or SOB, but conversation limited by cough. Patient is bothered by phlegm.    Other:  Gtt 0.04 vaso, 2.5 phenylephrine  NG tube in place  Ivory in place    MEDICATIONS  (STANDING):  atorvastatin 80 milliGRAM(s) Oral at bedtime  chlorhexidine 2% Cloths 1 Application(s) Topical <User Schedule>  clopidogrel Tablet 75 milliGRAM(s) Enteral Tube daily  doxazosin 2 milliGRAM(s) Oral at bedtime  droxidopa 200 milliGRAM(s) Oral every 8 hours  folic acid Injectable 1 milliGRAM(s) IV Push daily  glucagon  Injectable 1 milliGRAM(s) IntraMuscular once  insulin lispro (ADMELOG) corrective regimen sliding scale   SubCutaneous every 6 hours  magnesium sulfate  IVPB 1 Gram(s) IV Intermittent once  midodrine 30 milliGRAM(s) Oral every 8 hours  pantoprazole  Injectable 40 milliGRAM(s) IV Push every 12 hours  phenylephrine    Infusion 0.25 MICROgram(s)/kG/Min (9.25 mL/Hr) IV Continuous <Continuous>  piperacillin/tazobactam IVPB.. 3.375 Gram(s) IV Intermittent every 8 hours  potassium chloride   Powder 40 milliEquivalent(s) Oral once  sodium bicarbonate 650 milliGRAM(s) Oral three times a day  thiamine Injectable 100 milliGRAM(s) IV Push daily  vancomycin    Solution 125 milliGRAM(s) Oral every 6 hours  vasopressin Infusion 0.04 Unit(s)/Min (6 mL/Hr) IV Continuous <Continuous>    MEDICATIONS  (PRN):  acetaminophen     Tablet .. 650 milliGRAM(s) Oral every 6 hours PRN Temp greater or equal to 38C (100.4F)  fentaNYL    Injectable 50 MICROGram(s) IV Push every 4 hours PRN Severe Pain (7 - 10)    Allergies    No Known Allergies    Intolerances    ICU Vital Signs Last 24 Hrs  T(F): 99 (16 Jul 2024 00:00), Max: 99.3 (15 Jul 2024 16:00)  HR: 130 (16 Jul 2024 01:00) (71 - 133)  BP: 87/67 (16 Jul 2024 01:00) (75/54 - 144/73)  BP(mean): 74 (16 Jul 2024 01:00) (59 - 100)  ABP: 87/50 (15 Jul 2024 08:45) (87/50 - 102/59)  ABP(mean): 64 (15 Jul 2024 08:45) (64 - 75)  RR: 32 (16 Jul 2024 01:00) (15 - 37)  SpO2: 99% (16 Jul 2024 01:00) (95% - 100%)    Physical Exam:   GENERAL: NAD, lying in bed comfortably, NG tube in place  HEAD:  Atraumatic, Normocephalic  EYES: EOMI, PERRLA, conjunctiva and sclera clear  NECK: No JVD  CHEST/LUNG: Cough, Clear to auscultation bilaterally; No rales, rhonchi, wheezing, or rubs. Unlabored respirations  HEART: Tachycardic, no murmurs  ABDOMEN: Bowel sounds present; Soft, Nontender, Nondistended  : Ivory in place  EXTREMITIES: Legs covered, some swelling, but no pitting edema  NERVOUS SYSTEM:  Alert & Oriented X3, speech clear. No deficits.  SKIN: No rashes or lesions    I&O's Summary    14 Jul 2024 07:01  -  15 Jul 2024 07:00  --------------------------------------------------------  IN: 3482.8 mL / OUT: 710 mL / NET: 2772.8 mL    15 Jul 2024 07:01  -  16 Jul 2024 04:34  --------------------------------------------------------  IN: 716.8 mL / OUT: 815 mL / NET: -98.2 mL        LABS:                        11.4   11.84 )-----------( 89       ( 16 Jul 2024 02:16 )             33.5     07-16    146<H>  |  112<H>  |  72<H>  ----------------------------<  148<H>  3.3<L>   |  17<L>  |  1.66<H>    Ca    7.4<L>      16 Jul 2024 02:16  Phos  3.6     07-16  Mg     1.8     07-16    TPro  4.5<L>  /  Alb  2.0<L>  /  TBili  1.1  /  DBili  x   /  AST  87<H>  /  ALT  72<H>  /  AlkPhos  197<H>  07-16    PT/INR - ( 16 Jul 2024 02:16 )   PT: 12.0 sec;   INR: 1.09 ratio         PTT - ( 16 Jul 2024 02:16 )  PTT:29.7 sec  ABG - ( 15 Jul 2024 11:47 )  pH, Arterial: 7.39  pH, Blood: x     /  pCO2: 27    /  pO2: 114   / HCO3: 16    / Base Excess: -7.1  /  SaO2: 99.3      Venous: 07-16-24 @ 02:04 FiO2: 21 Oxygen Sat% 89.8    CARDIAC MARKERS ( 15 Jul 2024 00:33 )  x     / x     / 558 U/L / x     / x          CAPILLARY BLOOD GLUCOSE      POCT Blood Glucose.: 136 mg/dL (16 Jul 2024 01:02)  POCT Blood Glucose.: 196 mg/dL (15 Jul 2024 17:27)

## 2024-07-16 NOTE — PROGRESS NOTE ADULT - PROBLEM SELECTOR PLAN 6
will sign off as goals are established, please reconsult as needed   case discussed with primary team  Can be reached by TEAMS M-F 9-5 Marium Weiner Any other time please page [phone] if needed

## 2024-07-16 NOTE — PROGRESS NOTE ADULT - ASSESSMENT
79 yo male with PMH of CAD s/p PCI x3 with most recent stent 6/12/24 on Plavix, chronic Afib on eliquis, orthostatic hypotension on midodrine, PAD, neuroendocrine tumor with RT currently on hold, recent admission for dx cath on 6/24/24 who presented for hypotension, admitted for GIB and hemorrhagic shock. Found to be bleeding from duodenum. Transferred to MICU, on pressors.     1. Pancreatic NET- metastatic   -- was on lanreotide then had POD in liver and started on peptide receptor radiotherapy (PRRT)- typically given every 8 weeks for 4 doses. He received one dose on 10/20/2023 and planned to get his 2nd dose at the end of December however his course was complicated by multiple hospitalizations that were noncancer related (decompensated heart failure).   -- 5/28/24 PET Dotatate (compared to 9/2023): several new somatostatin avid osseous lesions, some faintly sclerotic, suspicious for mets. Increased upper RP nodes, probably metastatic. Decreased intensely tracer avid right supradiaphragmatic and upper abdominal nodes, suspicious for metastasis. Redemonstrated intensely somatostatin avid bilobar hepatic lesions, consistent with metastases again morphologically difficult to delineate.   -- CT here reviewed by [facility]: SINCE MAY 8 2024 INCREASED HEPATIC METASTASES, NEWLY SEEN PRIMARY TUMOR IN THE PANCREAS, and active bleeding within the duodenum with associated intraluminal hematoma.   -- chromogranin level is elevated at 2058, but no prior level at [facility] [place] for review   -- f/u with Dr. Sophia Prasad at [facility] after discharge, no plan for treatment inpatient, if clinically improves/stabilizes then can be considered for treatment outpatient    2. Duodenal bleed, Hemorrhagic shock  - Cardiology following, currently on 2 pressors   - Per endocrine, adrenal insufficiency ruled out given AM cortisol of 17, continues midodrine  - GI PCR indeterminate for norovirus on repeat test  - RRT 7/9 for Hypotension, shortness of breath, drop in hemoglobin.   - CT w/ bleed in duodenum. GI called, EGD 7/9 -> MICU consulted for uncontrolled bleeding followed by A-fib. S/p  IR embolization 7/9, intubated in MICU -> extubated today  - s/p multiple transfusions, fibrinogen low would recommend Cry for < 100, but coags have improved as are essentially normal now so unlikely, probably was related to liver dysfunction.   Can check factor levels V, VIII, X   - Once infection ruled out, may start octreotide 150 mcg BID  - s/p repeat EGD 7/12 showing duodenal lesions w/clots and oozing, no clear targets for intervention and no active bleeding, purastat applied to all areas of oozing. Hgb stable today, no overt bleeding    3. C. diff diarrhea  - on oral paulino Duque NP  Hematology/Oncology  [facility]  [phone] (Office)  [phone] (Alt office)  Evenings and weekends please call MD on call or office

## 2024-07-16 NOTE — PROGRESS NOTE ADULT - SUBJECTIVE AND OBJECTIVE BOX
SUBJECTIVE AND OBJECTIVE: pt seen and examined at bedside. pt lethargic on exam.   Indication for Geriatrics and Palliative Care Services/INTERVAL HPI: GOC     OVERNIGHT EVENTS: no acute events     DNR on chart:  Allergies    No Known Allergies    Intolerances    MEDICATIONS  (STANDING):  aMIOdarone Infusion 1 mG/Min (33.3 mL/Hr) IV Continuous <Continuous>  aMIOdarone Infusion 0.5 mG/Min (16.7 mL/Hr) IV Continuous <Continuous>  aMIOdarone IVPB 150 milliGRAM(s) IV Intermittent once  atorvastatin 80 milliGRAM(s) Oral at bedtime  chlorhexidine 2% Cloths 1 Application(s) Topical <User Schedule>  clopidogrel Tablet 75 milliGRAM(s) Enteral Tube daily  doxazosin 2 milliGRAM(s) Oral at bedtime  droxidopa 200 milliGRAM(s) Oral every 8 hours  folic acid Injectable 1 milliGRAM(s) IV Push daily  glucagon  Injectable 1 milliGRAM(s) IntraMuscular once  insulin lispro (ADMELOG) corrective regimen sliding scale   SubCutaneous every 6 hours  magnesium sulfate  IVPB 1 Gram(s) IV Intermittent once  midodrine 30 milliGRAM(s) Oral every 8 hours  pantoprazole  Injectable 40 milliGRAM(s) IV Push every 12 hours  phenylephrine    Infusion 0.25 MICROgram(s)/kG/Min (9.25 mL/Hr) IV Continuous <Continuous>  piperacillin/tazobactam IVPB.. 3.375 Gram(s) IV Intermittent every 8 hours  potassium chloride   Powder 40 milliEquivalent(s) Oral once  sodium bicarbonate 650 milliGRAM(s) Oral three times a day  thiamine Injectable 100 milliGRAM(s) IV Push daily  vancomycin    Solution 125 milliGRAM(s) Oral every 6 hours  vasopressin Infusion 0.04 Unit(s)/Min (6 mL/Hr) IV Continuous <Continuous>    MEDICATIONS  (PRN):  acetaminophen     Tablet .. 650 milliGRAM(s) Oral every 6 hours PRN Temp greater or equal to 38C (100.4F)  fentaNYL    Injectable 50 MICROGram(s) IV Push every 4 hours PRN Severe Pain (7 - 10)      ITEMS UNCHECKED ARE NOT PRESENT    PRESENT SYMPTOMS: [ x]Unable to self-report - see [ ] CPOT [x ] PAINADS [ x] RDOS  Source if other than patient:  [ ]Family   [ x]Team     Pain:  [ ]yes [ ]no  QOL impact -   Location -                    Aggravating factors -  Quality -  Radiation -  Timing-  Severity (0-10 scale):  Minimal acceptable level (0-10 scale):     CPOT:    https://www.[facility].org/getattachment/[email or web address]/Critical-Care-Pain-Observation-Tool-(CPOT)    PAINAD Score: See PAINAD tool and score below       Dyspnea:                           [ ]Mild [ ]Moderate [ ]Severe    RDOS: See RDOS tool and score below   0 to 2  minimal or no respiratory distress   3  mild distress  4 to 6 moderate distress  >7 severe distress      Anxiety:                             [ ]Mild [ ]Moderate [ ]Severe  Fatigue:                             [ ]Mild [ ]Moderate [ ]Severe  Nausea:                             [ ]Mild [ ]Moderate [ ]Severe  Loss of appetite:              [ ]Mild [ ]Moderate [ ]Severe  Constipation:                    [ ]Mild [ ]Moderate [ ]Severe    PCSSQ[Palliative Care Spiritual Screening Question]   Severity (0-10):  Score of 4 or > indicate consideration of Chaplaincy referral.  Chaplaincy Referral: [ ] yes [ ] refused [ ] following [ ] Deferred     Caregiver [place]? : [ ] yes [ ] no [ ] Deferred [ ] Declined             Social work referral [ ] Patient & Family Centered Care Referral [ ]     Anticipatory Grief present?:  [ ] yes [ ] no  [ ] Deferred                  Social work referral [ ] Chaplaincy Referral [ ]    		  Other Symptoms:  [ x]All other review of systems negative     Palliative Performance Status Version 2:   See PPSv2 tool and score below         PHYSICAL EXAM:  Vital Signs Last 24 Hrs  T(C): 37.3 (16 Jul 2024 15:00), Max: 37.4 (15 Jul 2024 16:00)  T(F): 99.1 (16 Jul 2024 15:00), Max: 99.3 (15 Jul 2024 16:00)  HR: 120 (16 Jul 2024 15:30) (84 - 168)  BP: 92/63 (16 Jul 2024 15:15) (73/50 - 126/65)  BP(mean): 75 (16 Jul 2024 15:15) (54 - 99)  RR: 30 (16 Jul 2024 15:30) (21 - 37)  SpO2: 100% (16 Jul 2024 15:30) (93% - 100%)     I&O's Summary    15 Jul 2024 07:01  -  16 Jul 2024 07:00  --------------------------------------------------------  IN: 1383.8 mL / OUT: 945 mL / NET: 438.8 mL    16 Jul 2024 07:01  -  16 Jul 2024 15:41  --------------------------------------------------------  IN: 2371 mL / OUT: 520 mL / NET: 1851 mL       GENERAL: [ ]Cachexia    [ ]Alert  [ ]Oriented x   [x ]Lethargic  [ ]Unarousable  [x ]Verbal  [ ]Non-Verbal  Behavioral:   [ ]Anxiety  [ ]Delirium [ ]Agitation [ ]Other  HEENT:  [ ]Normal   [ x]Dry mouth   [ ]ET Tube/Trach  [ ]Oral lesions  PULMONARY:   [ ]Clear [ ]Tachypnea  [ ]Audible excessive secretions   [ ]Rhonchi        [ ]Right [ ]Left [ ]Bilateral  [ ]Crackles        [ ]Right [ ]Left [ ]Bilateral  [ ]Wheezing     [ ]Right [ ]Left [ ]Bilateral  [x ]Diminished BS [ ] Right [ ]Left [ x]Bilateral  CARDIOVASCULAR:    [ ]Regular [ ]Irregular [ ]Tachy  [ ]Lavelle [ ]Murmur [ ]Other  GASTROINTESTINAL:  [x ]Soft  [ ]Distended   [x ]+BS  [ ]Non tender [ ]Tender  [ ]Other [ ]PEG [ ]OGT/ NGT   Last BM:   GENITOURINARY:  [ ]Normal [ ]Incontinent   [ ]Oliguria/Anuria   [x ]Ivory  MUSCULOSKELETAL:   [ ]Normal   [x ]Weakness  [x ]Bed/Wheelchair bound [ ]Edema  NEUROLOGIC:   [ ]No focal deficits  [x] Cognitive impairment  [ ] Dysphagia [ ]Dysarthria [ ] Paresis [ ]Other   SKIN:   [ ]Normal  [ ]Rash  [ ]Other  [ ]Pressure ulcer(s) [ ]y [ ]n present on admission    CRITICAL CARE:  [ ]Shock Present  [ ]Septic [ ]Cardiogenic [ ]Neurologic [ ]Hypovolemic  [ ]Vasopressors [ ]Inotropes  [ ]Respiratory failure present [ ]Mechanical Ventilation [ ]Non-invasive ventilatory support [ ]High-Flow   [ ]Acute  [ ]Chronic [ ]Hypoxic  [ ]Hypercarbic [ ]Other  [ ]Other organ failure     LABS:                        10.8   13.58 )-----------( 92       ( 16 Jul 2024 12:22 )             33.0   07-16    145  |  116<H>  |  64<H>  ----------------------------<  165<H>  4.1   |  17<L>  |  1.53<H>    Ca    7.0<L>      16 Jul 2024 12:22  Phos  3.4     07-16  Mg     1.8     07-16    TPro  4.3<L>  /  Alb  2.0<L>  /  TBili  1.1  /  DBili  x   /  AST  102<H>  /  ALT  67<H>  /  AlkPhos  359<H>  07-16  PT/INR - ( 16 Jul 2024 12:22 )   PT: 11.8 sec;   INR: 1.13 ratio         PTT - ( 16 Jul 2024 12:22 )  PTT:30.8 sec    Urinalysis Basic - ( 16 Jul 2024 12:22 )    Color: x / Appearance: x / SG: x / pH: x  Gluc: 165 mg/dL / Ketone: x  / Bili: x / Urobili: x   Blood: x / Protein: x / Nitrite: x   Leuk Esterase: x / RBC: x / WBC x   Sq Epi: x / Non Sq Epi: x / Bacteria: x      RADIOLOGY & ADDITIONAL STUDIES:    Protein Calorie Malnutrition Present: [ ]mild [ ]moderate [ ]severe [ ]underweight [ ]morbid obesity  https://www.andeal.org/vault/4080/web/files/ONC/Table_Clinical%20Characteristics%20to%20Document%20Malnutrition-White%20JV%20et%20al%202012.pdf    Height (cm): 188 (07-09-24 @ 13:27), 188 (06-24-24 @ 13:43), 188 (06-12-24 @ 11:58)  Weight (kg): 98.7 (07-09-24 @ 13:27), 102.5 (06-24-24 @ 13:43), 102.1 (06-12-24 @ 11:58)  BMI (kg/m2): 27.9 (07-09-24 @ 13:27), 29 (06-24-24 @ 13:43), 28.9 (06-12-24 @ 11:58)    [ x]PPSV2 < or = 30%  [ ]significant weight loss [ ]poor nutritional intake [ ]anasarca[ ]Artificial Nutrition    Other REFERRALS:  [ ]Hospice  [ ]Child Life  [ ]Social Work  [ ]Case management [ ]Holistic Therapy     Goals of Care Document:

## 2024-07-16 NOTE — PROGRESS NOTE ADULT - SUBJECTIVE AND OBJECTIVE BOX
[facility]   [phone]  RENAL FOLLOW UP NOTE  --------------------------------------------------------------------------------  Chief Complaint:    24 hour events/subjective:    seen earlier   extubated   on O2   on pressors     PAST HISTORY  --------------------------------------------------------------------------------  No significant changes to PMH, PSH, FHx, SHx, unless otherwise noted    ALLERGIES & MEDICATIONS  --------------------------------------------------------------------------------  Allergies    No Known Allergies    Intolerances      Standing Inpatient Medications  aMIOdarone Infusion 0.5 mG/Min IV Continuous <Continuous>  aMIOdarone Infusion 1 mG/Min IV Continuous <Continuous>  atorvastatin 80 milliGRAM(s) Oral at bedtime  chlorhexidine 2% Cloths 1 Application(s) Topical <User Schedule>  clopidogrel Tablet 75 milliGRAM(s) Enteral Tube daily  doxazosin 2 milliGRAM(s) Oral at bedtime  droxidopa 200 milliGRAM(s) Oral every 8 hours  folic acid Injectable 1 milliGRAM(s) IV Push daily  glucagon  Injectable 1 milliGRAM(s) IntraMuscular once  insulin lispro (ADMELOG) corrective regimen sliding scale   SubCutaneous every 6 hours  magnesium sulfate  IVPB 1 Gram(s) IV Intermittent once  midodrine 30 milliGRAM(s) Oral every 8 hours  pantoprazole  Injectable 40 milliGRAM(s) IV Push every 12 hours  phenylephrine    Infusion 0.25 MICROgram(s)/kG/Min IV Continuous <Continuous>  piperacillin/tazobactam IVPB.. 3.375 Gram(s) IV Intermittent every 8 hours  potassium chloride   Powder 40 milliEquivalent(s) Oral once  sodium bicarbonate 650 milliGRAM(s) Oral three times a day  thiamine Injectable 100 milliGRAM(s) IV Push daily  vancomycin    Solution 125 milliGRAM(s) Oral every 6 hours  vasopressin Infusion 0.04 Unit(s)/Min IV Continuous <Continuous>    PRN Inpatient Medications  acetaminophen     Tablet .. 650 milliGRAM(s) Oral every 6 hours PRN  fentaNYL    Injectable 50 MICROGram(s) IV Push every 4 hours PRN      REVIEW OF SYSTEMS  --------------------------------------------------------------------------------    Gen: denies  fevers/chills,  CVS: denies chest pain/palpitations  Resp: denies SOB/Cough  GI: Denies N/V/Abd pain  : Denies dysuria    VITALS/PHYSICAL EXAM  --------------------------------------------------------------------------------  T(C): 38.1 (07-16-24 @ 20:00), Max: 38.1 (07-16-24 @ 20:00)  HR: 110 (07-16-24 @ 20:15) (103 - 168)  BP: 105/67 (07-16-24 @ 20:15) (73/50 - 124/62)  RR: 29 (07-16-24 @ 20:15) (21 - 35)  SpO2: 99% (07-16-24 @ 20:15) (93% - 100%)  Wt(kg): --        07-15-24 @ 07:01  -  07-16-24 @ 07:00  --------------------------------------------------------  IN: 1383.8 mL / OUT: 945 mL / NET: 438.8 mL    07-16-24 @ 07:01  -  07-16-24 @ 20:45  --------------------------------------------------------  IN: 3757.6 mL / OUT: 780 mL / NET: 2977.6 mL      Physical Exam:  	    Gen:  on O2 on pressors   	Pulm: decrease bs  no rales  +  ronchi  -  wheezing  	CV: No JVD. RRR, S1S2; no rub  	Abd: +BS, soft /nondistended  	: No bladder distention   	UE: Warm, no cyanosis  no clubbing,  no edema  	LE: Warm, no cyanosis  no clubbing,  4+  edema  	Neuro:  alert     LABS/STUDIES  --------------------------------------------------------------------------------              10.8   13.58 >-----------<  92       [07-16-24 @ 12:22]              33.0     145  |  116  |  64  ----------------------------<  165      [07-16-24 @ 12:22]  4.1   |  17  |  1.53        Ca     7.0     [07-16-24 @ 12:22]      Mg     1.8     [07-16-24 @ 12:22]      Phos  3.4     [07-16-24 @ 12:22]    TPro  4.3  /  Alb  2.0  /  TBili  1.1  /  DBili  x   /  AST  102  /  ALT  67  /  AlkPhos  359  [07-16-24 @ 12:22]    PT/INR: PT 11.8 , INR 1.13       [07-16-24 @ 12:22]  PTT: 30.8       [07-16-24 @ 12:22]          [07-15-24 @ 00:33]    Creatinine Trend:  SCr 1.53 [07-16 @ 12:22]  SCr 1.66 [07-16 @ 02:16]  SCr 1.61 [07-15 @ 11:50]  SCr 1.60 [07-15 @ 00:33]  SCr 1.54 [07-14 @ 12:57]                    TSH 2.47      [07-04-24 @ 12:31]

## 2024-07-16 NOTE — PROGRESS NOTE ADULT - ASSESSMENT
Assessment: 79 yo male with metastatic neuroendocrine pancreatic cancer (tx on hold) and PMH of CAD s/p PCI x3 with most recent stent 6/12/24 on Plavix and eliquis , chronic Afib on eliquis, orthostatic hypotension on midodrine, PAD , recent admission for dx cath on 6/24/24 presented from PCP's office for hypotension despite taking AM midodrine dose on 7/2, admitted to medicine for symptomatic hypotension and RAZIA. Per chart, on 7/8 PM, pt was noted to have acute onset of melena x5 and coffee ground emesis x2-3. RRT was called on 7/9 AM for hypotension, hgb dropped from 9.2 to 7.4 (admission baseline 10-11), FOBT +, pt was started on PPI IV and transfused 1 unit of pRBC. GI was consulted and underwent EGD on 7/9 afternoon. MICU was consulted for uncontrolled bleeding during EGD. During EGD, pt became hypotensive and was given phenylephrine, had A-fib with RVR s/p amiodarone. Now s/p IR GDA embolization, admitted to MICU for further moniring i.s.o hemorrhagic shock 2/2 GI bleed. On repeat EGD, oozing but no active bleeding was noted. Hospital course c/b c.diff, and patient has been placed on vancomycin.    Plan:     Neuro   # Baseline AOx3  Course:  - intubated 7/9  - weaned off propofol onto precedex  - extubated 7/15  Plan:  - On henny and vasopressin  - Increased midodrine to 30 mg q8 to try to wean off pressors (7/15)  - fentanyl prn for pain control (last dose 7/12/24)    CVS  # hemorrhagic shock   Course:  - RRT called 7/9 for hypotension   - 2/2 to Upper GI bleeds, urgently took to EGD, found bleeding ulcer that was not well controlled, underwent IR empiric embolization (7/9)  - s/p 5 pRBC prior to MICU, and 4 pRBC, 2 FFP, 2 platelets in MICU  - Restarted plavix 7/13/24 for new stent (6/12/24) per GI  - on henny, maintain MAP > 65; took off vaso and precedex (7/13/24), he got tachy to 118. Added precedex back. vasopressin added 7/14  - per spouse, pt's baseline BP is a little bit over 100  Findings:  - AM cortisol 17 (7/5), AM cortisol 37.1 (7/11)  Plan:  - Transfuse as needed  - Continue henny (MAP >65) and vasopressin, try to wean  - Continue plavix  - Continue midodrine 30mg q8  - Continue droxidopa 200mg q8     #CAD s/p PCI x3, most recent stent 6/12/24, NURIA to pLAD   Course:  - restarted plavix 7/13/24 for new stent (6/12/24) per GI   Plan:  - c/w statin     #PAD  # A-fib on eliquis    - s/p successful DCCV 10/31   Plan:  - hold sotalol given hypotension 2/2 GI bleeds   - hold eliquis   - Per cards, "- C/w of Sotalol 40mg PO daily (qTC wnl)"    PULM   #Intubated for airway protection prior to EGD/IR embolization  Course:  - Intubated 7/9  - Extubated 7/15    Renal/  #RAZIA   #ATN i.s.o hypotension  #metabolic acidosis   Plan:  - f/u nephro recs  - per nephro, "add sodium bicarb 650 mg tid"  - trend BUN/Cr   - monitor Is and Os     #Urinary retention  Plan:  - Started Doxazosin 2 mg daily (7/15)    GI  #Upper GI Bleed   Course:  - s/p 5 units pRBC prior to MICU and then 4:2:2 on 7/12  - CT A/P 7/9 - Active bleeding in the third portion of the duodenum. Progression of liver metastases.  - EGD 7/9 showed esophagitis, One non-bleeding duodenal ulcer with a clean ulcer base (Arjun Class III). One oozing duodenal ulcer with spurting hemorrhage (Arjun Class Ia). Treatment not successful. Treated with bipolar cautery.  - s/p IR embolization on 7/9  - placed OG tube per GI and started ensure clear liquid (see note from RD 7/13/24)  - OG tube removed during extubation (7/15/24)  -dysphagia screen failed  Plan:  - per GI recs, changed PPI gtt to PPI IV BID  - monitor Hgb, transfuse as needed   - hold eliquis   - Evaluate need for NG tube  - Follow up speech and swallow evaluation  - continue to f/u with GI and IR     - Per GI, "Will need PPI BID for 8 weeks for grade D esophagitis and PUD"    #Diarrhea   Course:  - stopped maintenance fluids   - positive for c. diff (7/14)  - started vancomycin (7/14 -)   Plan:  - Continue vancomycin    ID  #leukocytosis   Course:  -  BCx 7/10 - NGTD  - Sputum Cx from ETT 7/13 showed numerous gram neg krishna  - Start zosyn (7/11 - )   - resent GI panel (previously indeterminate norovirus)  - per ID- no intervention needed regarding the PCR results  - started vancomycin for c. diff (7/14- )  Plan:  - Continue vancomycin for c. diff  - Continue zosyn    ENDOCRINE  #adrenal insufficiency   Findings:  -7/5 cortisol 17   - 7/11 cortisol 37.1     HEMATOLOGY/ONCOLOGY   # neuroendocrine tumor   - was on lanreotide then had POD in liver and started on peptide receptor radiotherapy (PRRT)- typically given every 8 weeks for 4 doses. He last received it 10/20/2023   - PET 5/28/24 shows new somatostatin avid osseous lesions; decreased intensely avid right supradiaphragmatic and upper abdominal nodes, suspicious for mets  Plan:  - per heme/onc, can resume octreotide once infection r/o   - Per heme/onc, "-- f/u with Dr. Sophia Prasad at [facility] [place], no plan for treatment inpatient, if clinically improves/stabilizes then can be considered for treatment outpatient"    SKINS:   - Lines/Tubes - PIV, cordis replaced with central line (7/14)    Ethics:   - Full Code   - [facility] 7/12, spoke to spouse (Yamilet) over phone with palliative care team    Consults this admission: GI, Heme Onc, Infectious Disease, Cardiology, Nephrology   Assessment: 77 yo male with metastatic neuroendocrine pancreatic cancer (tx on hold) and PMH of CAD s/p PCI x3 with most recent stent 6/12/24 on Plavix and eliquis , chronic Afib on eliquis, orthostatic hypotension on midodrine, PAD , recent admission for dx cath on 6/24/24 presented from PCP's office for hypotension despite taking AM midodrine dose on 7/2, admitted to medicine for symptomatic hypotension and RAZIA. Per chart, on 7/8 PM, pt was noted to have acute onset of melena x5 and coffee ground emesis x2-3. RRT was called on 7/9 AM for hypotension, hgb dropped from 9.2 to 7.4 (admission baseline 10-11), FOBT +, pt was started on PPI IV and transfused 1 unit of pRBC. GI was consulted and underwent EGD on 7/9 afternoon. MICU was consulted for uncontrolled bleeding during EGD. During EGD, pt became hypotensive and was given phenylephrine, had A-fib with RVR s/p amiodarone. Now s/p IR GDA embolization, admitted to MICU for further moniring i.s.o hemorrhagic shock 2/2 GI bleed. On repeat EGD, oozing but no active bleeding was noted. Hospital course c/b c.diff, and patient has been placed on vancomycin.    Plan:     Neuro   # Baseline AOx3  Course:  - intubated 7/9  - weaned off propofol onto precedex  - extubated 7/15  Plan:  - On henny and vasopressin  - Increased midodrine to 30 mg q8 to try to wean off pressors (7/15)  - fentanyl prn for pain control (last dose 7/12/24)    CVS  # hemorrhagic shock   Course:  - RRT called 7/9 for hypotension   - 2/2 to Upper GI bleeds, urgently took to EGD, found bleeding ulcer that was not well controlled, underwent IR empiric embolization (7/9)  - s/p 5 pRBC prior to MICU, and 4 pRBC, 2 FFP, 2 platelets in MICU  - Restarted plavix 7/13/24 for new stent (6/12/24) per GI  - on henny, maintain MAP > 65; took off vaso and precedex (7/13/24), he got tachy to 118. Added precedex back. vasopressin added 7/14  - per spouse, pt's baseline BP is a little bit over 100  Findings:  - AM cortisol 17 (7/5), AM cortisol 37.1 (7/11)  Plan:  - Transfuse as needed  - Continue henny (MAP >65) and vasopressin, try to wean  - Continue plavix  - Continue midodrine 30mg q8  - Continue droxidopa 200mg q8     #CAD s/p PCI x3, most recent stent 6/12/24, NURIA to pLAD   Course:  - restarted plavix 7/13/24 for new stent (6/12/24) per GI   Plan:  - c/w statin     #PAD  # A-fib on eliquis    - s/p successful DCCV 10/31   Plan:  - hold sotalol given hypotension 2/2 GI bleeds   - hold eliquis   - Per cards, "- C/w of Sotalol 40mg PO daily (qTC wnl)"    PULM   #Intubated for airway protection prior to EGD/IR embolization  Course:  - Intubated 7/9  - Extubated 7/15    Renal/  #RAZIA   #ATN i.s.o hypotension  #metabolic acidosis   Plan:  - f/u nephro recs  - per nephro, "add sodium bicarb 650 mg tid"  - trend BUN/Cr   - monitor Is and Os     #Urinary retention  Plan:  - Started Doxazosin 2 mg daily (7/15)    GI  #Upper GI Bleed   Course:  - s/p 5 units pRBC prior to MICU and then 4:2:2 on 7/12  - CT A/P 7/9 - Active bleeding in the third portion of the duodenum. Progression of liver metastases.  - EGD 7/9 showed esophagitis, One non-bleeding duodenal ulcer with a clean ulcer base (Arjun Class III). One oozing duodenal ulcer with spurting hemorrhage (Arjun Class Ia). Treatment not successful. Treated with bipolar cautery.  - s/p IR embolization on 7/9  - placed OG tube per GI and started ensure clear liquid (see note from RD 7/13/24)  - OG tube removed during extubation (7/15/24)  -dysphagia screen failed  Plan:  - per GI recs, changed PPI gtt to PPI IV BID  - monitor Hgb, transfuse as needed   - hold eliquis   - Evaluate need for NG tube  - Follow up speech and swallow evaluation  - continue to f/u with GI and IR     - Per GI, "Will need PPI BID for 8 weeks for grade D esophagitis and PUD"    #Diarrhea   Course:  - stopped maintenance fluids   - positive for c. diff (7/14)  - started vancomycin (7/14 -)   Plan:  - Continue vancomycin    ID  #leukocytosis   Course:  -  BCx 7/10 - NGTD  - Sputum Cx from ETT 7/13 showed numerous gram neg krishna  - Start zosyn (7/11 - )   - resent GI panel (previously indeterminate norovirus)  - per ID- no intervention needed regarding the PCR results  - started vancomycin for c. diff (7/14- )  Plan:  - Continue vancomycin for c. diff  - Continue zosyn    ENDOCRINE  #adrenal insufficiency   Findings:  -7/5 cortisol 17   - 7/11 cortisol 37.1     HEMATOLOGY/ONCOLOGY   # neuroendocrine tumor   - was on lanreotide then had POD in liver and started on peptide receptor radiotherapy (PRRT)- typically given every 8 weeks for 4 doses. He last received it 10/20/2023   - PET 5/28/24 shows new somatostatin avid osseous lesions; decreased intensely avid right supradiaphragmatic and upper abdominal nodes, suspicious for mets  Plan:  - per heme/onc, can resume octreotide once infection r/o   - Per heme/onc, "-- f/u with Dr. Sophia Prasad at [facility] [place], no plan for treatment inpatient, if clinically improves/stabilizes then can be considered for treatment outpatient"    SKINS:   - Lines/Tubes - PIV, cordis replaced with central line (7/14)    Ethics:   - Full Code   - [facility] 7/12, spoke to spouse (Yamilet) over phone with palliative care team    Consults this admission: GI, Heme Onc, Palliative, Endocrinology, Cardiology, Nephrology   Assessment: 79 yo male with metastatic neuroendocrine pancreatic cancer (tx on hold) and PMH of CAD s/p PCI x3 with most recent stent 6/12/24 on Plavix and eliquis , chronic Afib on eliquis, orthostatic hypotension on midodrine, PAD , recent admission for dx cath on 6/24/24 presented from PCP's office for hypotension despite taking AM midodrine dose on 7/2, admitted to medicine for symptomatic hypotension and RAZIA. Per chart, on 7/8 PM, pt was noted to have acute onset of melena x5 and coffee ground emesis x2-3. RRT was called on 7/9 AM for hypotension, hgb dropped from 9.2 to 7.4 (admission baseline 10-11), FOBT +, pt was started on PPI IV and transfused 1 unit of pRBC. GI was consulted and underwent EGD on 7/9 afternoon. MICU was consulted for uncontrolled bleeding during EGD. During EGD, pt became hypotensive and was given phenylephrine, had A-fib with RVR s/p amiodarone. Now s/p IR GDA embolization, admitted to MICU for further moniring i.s.o hemorrhagic shock 2/2 GI bleed. On repeat EGD, oozing but no active bleeding was noted. Hospital course c/b c.diff, and patient has been placed on vancomycin.    Plan:     Neuro   # Baseline AOx3  Course:  - intubated 7/9  - weaned off propofol onto precedex  - extubated 7/15  Plan:  - On henny and vasopressin  - Increased midodrine to 30 mg q8 to try to wean off pressors (7/15)  - fentanyl prn for pain control (last dose 7/12/24)    CVS  # hemorrhagic shock   Course:  - RRT called 7/9 for hypotension   - 2/2 to Upper GI bleeds, urgently took to EGD, found bleeding ulcer that was not well controlled, underwent IR empiric embolization (7/9)  - s/p 5 pRBC prior to MICU, and 4 pRBC, 2 FFP, 2 platelets in MICU  - Restarted plavix 7/13/24 for new stent (6/12/24) per GI  - on henny, maintain MAP > 65; took off vaso and precedex (7/13/24), he got tachy to 118. Added precedex back. vasopressin added 7/14  - per spouse, pt's baseline BP is a little bit over 100  Findings:  - AM cortisol 17 (7/5), AM cortisol 37.1 (7/11)  Plan:  - Transfuse as needed  - Continue henny (MAP >65) and vasopressin, try to wean  - Continue plavix  - Continue midodrine 30mg q8  - Continue droxidopa 200mg q8     #CAD s/p PCI x3, most recent stent 6/12/24, NURIA to pLAD   Course:  - restarted plavix 7/13/24 for new stent (6/12/24) per GI   Plan:  - c/w statin     #PAD  # A-fib on eliquis    - s/p successful DCCV 10/31   Plan:  - hold sotalol given hypotension 2/2 GI bleeds   - hold eliquis   - Per cards, "- C/w of Sotalol 40mg PO daily (qTC wnl)"    PULM   #Intubated for airway protection prior to EGD/IR embolization  Course:  - Intubated 7/9  - Extubated 7/15    Renal/  #RAZIA   #ATN i.s.o hypotension  #metabolic acidosis   Plan:  - f/u nephro recs  - per nephro, "add sodium bicarb 650 mg tid"  - trend BUN/Cr   - monitor Is and Os     #Urinary retention  Plan:  - Started Doxazosin 2 mg daily (7/15)    GI  #Upper GI Bleed   Course:  - s/p 5 units pRBC prior to MICU and then 4:2:2 on 7/12  - CT A/P 7/9 - Active bleeding in the third portion of the duodenum. Progression of liver metastases.  - EGD 7/9 showed esophagitis, One non-bleeding duodenal ulcer with a clean ulcer base (Arjun Class III). One oozing duodenal ulcer with spurting hemorrhage (Arjun Class Ia). Treatment not successful. Treated with bipolar cautery.  - s/p IR embolization on 7/9  - placed OG tube per GI and started ensure clear liquid (see note from RD 7/13/24)  - OG tube removed during extubation (7/15/24)  -dysphagia screen failed  Plan:  - per GI recs, changed PPI gtt to PPI IV BID  - monitor Hgb, transfuse as needed   - hold eliquis   - NG tube in place  - Follow up speech and swallow evaluation  - continue to f/u with GI and IR     - Per GI, "Will need PPI BID for 8 weeks for grade D esophagitis and PUD"    #Diarrhea   Course:  - stopped maintenance fluids   - positive for c. diff (7/14)  - started vancomycin (7/14 -)   Plan:  - Continue vancomycin    ID  #leukocytosis   Course:  -  BCx 7/10 - NGTD  - Sputum Cx from ETT 7/13 showed numerous gram neg krishna  - Start zosyn (7/11 - )   - resent GI panel (previously indeterminate norovirus)  - per ID- no intervention needed regarding the PCR results  - started vancomycin for c. diff (7/14- )  Plan:  - Continue vancomycin for c. diff  - Continue zosyn    ENDOCRINE  #adrenal insufficiency   Findings:  -7/5 cortisol 17   - 7/11 cortisol 37.1     HEMATOLOGY/ONCOLOGY   # neuroendocrine tumor   - was on lanreotide then had POD in liver and started on peptide receptor radiotherapy (PRRT)- typically given every 8 weeks for 4 doses. He last received it 10/20/2023   - PET 5/28/24 shows new somatostatin avid osseous lesions; decreased intensely avid right supradiaphragmatic and upper abdominal nodes, suspicious for mets  Plan:  - per heme/onc, can resume octreotide once infection r/o   - Per heme/onc, "-- f/u with Dr. Sophia Prasad at [facility] [place] after discharge, no plan for treatment inpatient, if clinically improves/stabilizes then can be considered for treatment outpatient"    SKINS:   - Lines/Tubes - PIV, cordis replaced with central line (7/14)    Ethics:   - Full Code   - [facility] 7/12, spoke to spouse (Yamilet) over phone with palliative care team    Consults this admission: GI, Heme Onc, Palliative, Endocrinology, Cardiology, Nephrology   Assessment: 77 yo male with metastatic neuroendocrine pancreatic cancer (tx on hold) and PMH of CAD s/p PCI x3 with most recent stent 6/12/24 on Plavix and eliquis , chronic Afib on eliquis, orthostatic hypotension on midodrine, PAD , recent admission for dx cath on 6/24/24 presented from PCP's office for hypotension despite taking AM midodrine dose on 7/2, admitted to medicine for symptomatic hypotension and RAZIA. Per chart, on 7/8 PM, pt was noted to have acute onset of melena x5 and coffee ground emesis x2-3. RRT was called on 7/9 AM for hypotension, hgb dropped from 9.2 to 7.4 (admission baseline 10-11), FOBT +, pt was started on PPI IV and transfused 1 unit of pRBC. GI was consulted and underwent EGD on 7/9 afternoon. MICU was consulted for uncontrolled bleeding during EGD. During EGD, pt became hypotensive and was given phenylephrine, had A-fib with RVR s/p amiodarone. Now s/p IR GDA embolization, admitted to MICU for further moniring i.s.o hemorrhagic shock 2/2 GI bleed. On repeat EGD, oozing but no active bleeding was noted. Hospital course c/b c.diff, and patient has been placed on vancomycin.    Plan:     Neuro   # Baseline AOx3  Course:  - intubated 7/9  - weaned off propofol onto precedex  - extubated 7/15  Plan:  - On henny and vasopressin  - Increased midodrine to 30 mg q8 to try to wean off pressors (7/15)  - fentanyl prn for pain control (last dose 7/12/24)    CVS  # hemorrhagic shock   Course:  - RRT called 7/9 for hypotension   - 2/2 to Upper GI bleeds, urgently took to EGD, found bleeding ulcer that was not well controlled, underwent IR empiric embolization (7/9)  - s/p 5 pRBC prior to MICU, and 4 pRBC, 2 FFP, 2 platelets in MICU  - Restarted plavix 7/13/24 for new stent (6/12/24) per GI  - on henny, maintain MAP > 65; took off vaso and precedex (7/13/24), he got tachy to 118. Added precedex back. vasopressin added 7/14  - per spouse, pt's baseline BP is a little bit over 100  Findings:  - AM cortisol 17 (7/5), AM cortisol 37.1 (7/11)  Plan:  - Transfuse as needed  - Continue henny (MAP >65) and vasopressin, try to wean  - Continue plavix  - Continue midodrine 30mg q8  - Continue droxidopa 200mg q8     #CAD s/p PCI x3, most recent stent 6/12/24, NURIA to pLAD   Course:  - restarted plavix 7/13/24 for new stent (6/12/24) per GI   Plan:  - c/w statin     #PAD  # A-fib on eliquis    - s/p successful DCCV 10/31   Plan:  - hold sotalol given hypotension 2/2 GI bleeds   - hold eliquis   - Per cards, "- C/w of Sotalol 40mg PO daily (qTC wnl)"  - Consider TRACY?    PULM   #Intubated for airway protection prior to EGD/IR embolization  Course:  - Intubated 7/9  - Extubated 7/15    Renal/  #RAZIA   #ATN i.s.o hypotension  #metabolic acidosis   Plan:  - f/u nephro recs  - per nephro, "add sodium bicarb 650 mg tid"  - trend BUN/Cr   - monitor Is and Os     #Urinary retention  Plan:  - Started Doxazosin 2 mg daily (7/15)    GI  #Upper GI Bleed   Course:  - s/p 5 units pRBC prior to MICU and then 4:2:2 on 7/12  - CT A/P 7/9 - Active bleeding in the third portion of the duodenum. Progression of liver metastases.  - EGD 7/9 showed esophagitis, One non-bleeding duodenal ulcer with a clean ulcer base (Arjun Class III). One oozing duodenal ulcer with spurting hemorrhage (Arjun Class Ia). Treatment not successful. Treated with bipolar cautery.  - s/p IR embolization on 7/9  - placed OG tube per GI and started ensure clear liquid (see note from RD 7/13/24)  - OG tube removed during extubation (7/15/24)  -dysphagia screen failed  Plan:  - per GI recs, changed PPI gtt to PPI IV BID  - monitor Hgb, transfuse as needed   - hold eliquis   - NG tube in place  - Follow up speech and swallow evaluation  - continue to f/u with GI and IR     - Per GI, "Will need PPI BID for 8 weeks for grade D esophagitis and PUD"    #Diarrhea   Course:  - stopped maintenance fluids   - positive for c. diff (7/14)  - started vancomycin (7/14 -)   Plan:  - Continue vancomycin    ID  #leukocytosis   Course:  -  BCx 7/10 - NGTD  - Sputum Cx from ETT 7/13 showed numerous gram neg krishna  - Start zosyn (7/11 - )   - resent GI panel (previously indeterminate norovirus)  - per ID- no intervention needed regarding the PCR results  - started vancomycin for c. diff (7/14- )  Plan:  - Continue vancomycin for c. diff  - Continue zosyn    ENDOCRINE  #adrenal insufficiency   Findings:  -7/5 cortisol 17   - 7/11 cortisol 37.1     HEMATOLOGY/ONCOLOGY   # neuroendocrine tumor   - was on lanreotide then had POD in liver and started on peptide receptor radiotherapy (PRRT)- typically given every 8 weeks for 4 doses. He last received it 10/20/2023   - PET 5/28/24 shows new somatostatin avid osseous lesions; decreased intensely avid right supradiaphragmatic and upper abdominal nodes, suspicious for mets  Plan:  - per heme/onc, can resume octreotide once infection r/o   - Per heme/onc, "-- [place] [facility] [place] plan for treatment inpatient, if clinically improves/stabilizes then can be considered for treatment outpatient"    SKINS:   - Lines/Tubes - PIV, cordis replaced with central line (7/14)    Ethics:   - Full Code   - [facility] 7/12, spoke to spouse (Yamilet) over phone with palliative care team    Consults this admission: GI, Heme Onc, Palliative, Endocrinology, Cardiology, Nephrology   Assessment: 79 yo male with metastatic neuroendocrine pancreatic cancer (tx on hold) and PMH of CAD s/p PCI x3 with most recent stent 6/12/24 on Plavix and eliquis , chronic Afib on eliquis, orthostatic hypotension on midodrine, PAD , recent admission for dx cath on 6/24/24 presented from PCP's office for hypotension despite taking AM midodrine dose on 7/2, admitted to medicine for symptomatic hypotension and RAZIA. Per chart, on 7/8 PM, pt was noted to have acute onset of melena x5 and coffee ground emesis x2-3. RRT was called on 7/9 AM for hypotension, hgb dropped from 9.2 to 7.4 (admission baseline 10-11), FOBT +, pt was started on PPI IV and transfused 1 unit of pRBC. GI was consulted and underwent EGD on 7/9 afternoon. MICU was consulted for uncontrolled bleeding during EGD. During EGD, pt became hypotensive and was given phenylephrine, had A-fib with RVR s/p amiodarone. Now s/p IR GDA embolization, admitted to MICU for further moniring i.s.o hemorrhagic shock 2/2 GI bleed. On repeat EGD, oozing but no active bleeding was noted. Hospital course c/b c.diff, and patient has been placed on vancomycin.    Plan:     Neuro   # Baseline AOx3  Course:  - intubated 7/9  - weaned off propofol onto precedex  - extubated 7/15  Plan:  - On henny and vasopressin  - Increased midodrine to 30 mg q8 to try to wean off pressors (7/15)  - fentanyl prn for pain control (last dose 7/12/24)    CVS  # hemorrhagic shock   Course:  - RRT called 7/9 for hypotension   - 2/2 to Upper GI bleeds, urgently took to EGD, found bleeding ulcer that was not well controlled, underwent IR empiric embolization (7/9)  - s/p 5 pRBC prior to MICU, and 4 pRBC, 2 FFP, 2 platelets in MICU  - Restarted plavix 7/13/24 for new stent (6/12/24) per GI  - on henny, maintain MAP > 65; took off vaso and precedex (7/13/24), he got tachy to 118. Added precedex back. vasopressin added 7/14  - per spouse, pt's baseline BP is a little bit over 100  Findings:  - AM cortisol 17 (7/5), AM cortisol 37.1 (7/11)  - POCUS ECHO (7/16) showed no cardiogenic shock, no tamponade, low SV indeterminate IVC, no significant B lines, and some subdiaphragmatic fluid  Plan:  - Transfuse as needed  - Continue henny (MAP >65) and vasopressin, try to wean  - Continue plavix  - Continue midodrine 30mg q8  - Continue droxidopa 200mg q8   - Albumin 5%  mL given today    #CAD s/p PCI x3, most recent stent 6/12/24, NURIA to pLAD   Course:  - restarted plavix 7/13/24 for new stent (6/12/24) per GI   Plan:  - c/w statin     #PAD  # A-fib on eliquis    - s/p successful DCCV 10/31   Plan:  - hold eliquis   - Per cards, "C/w of Sotalol 40mg PO daily (qTC wnl)"    PULM   #Intubated for airway protection prior to EGD/IR embolization  Course:  - Intubated 7/9  - Extubated 7/15  - On RA, satting well    Renal/  #RAZIA   #ATN i.s.o hypotension  #metabolic acidosis   Plan:  - f/u nephro recs  - per nephro, "add sodium bicarb 650 mg tid"  - trend BUN/Cr   - monitor Is and Os     #Urinary retention  Plan:  - Started Doxazosin 2 mg daily (7/15)    GI  #Upper GI Bleed   Course:  - s/p 5 units pRBC prior to MICU and then 4:2:2 on 7/12  - CT A/P 7/9 - Active bleeding in the third portion of the duodenum. Progression of liver metastases.  - EGD 7/9 showed esophagitis, One non-bleeding duodenal ulcer with a clean ulcer base (Arjun Class III). One oozing duodenal ulcer with spurting hemorrhage (Arjun Class Ia). Treatment not successful. Treated with bipolar cautery.  - s/p IR embolization on 7/9  - placed OG tube per GI and started ensure clear liquid (see note from RD 7/13/24)  - OG tube removed during extubation (7/15/24)  -dysphagia screen failed  Plan:  - per GI recs, changed PPI gtt to PPI IV BID  - monitor Hgb, transfuse as needed   - hold eliquis   - NG tube in place  - Follow up speech and swallow evaluation  - continue to f/u with GI and IR     - Per GI, "Will need PPI BID for 8 weeks for grade D esophagitis and PUD"    #Diarrhea   Course:  - stopped maintenance fluids   - positive for c. diff (7/14)  - started vancomycin (7/14 -)   Plan:  - Continue vancomycin    ID  #leukocytosis   Course:  -  BCx 7/10 - NGTD  - Sputum Cx from ETT 7/13 showed numerous gram neg krishna  - Start zosyn (7/11 - )   - resent GI panel (previously indeterminate norovirus)  - per ID- no intervention needed regarding the PCR results  - started vancomycin for c. diff (7/14- )  Plan:  - Continue vancomycin for c. diff  - Continue zosyn    ENDOCRINE  #adrenal insufficiency   Findings:  -7/5 cortisol 17   - 7/11 cortisol 37.1     HEMATOLOGY/ONCOLOGY   # neuroendocrine tumor   - was on lanreotide then had POD in liver and started on peptide receptor radiotherapy (PRRT)- typically given every 8 weeks for 4 doses. He last received it 10/20/2023   - PET 5/28/24 shows new somatostatin avid osseous lesions; decreased intensely avid right supradiaphragmatic and upper abdominal nodes, suspicious for mets  Plan:  - per heme/onc, can resume octreotide once infection r/o   - Per heme/onc, "-- [place] [facility] [place] plan for treatment inpatient, if clinically improves/stabilizes then can be considered for treatment outpatient"    SKINS:   - Lines/Tubes - PIV, cordis replaced with central line (7/14)    Ethics:   - Full Code   - [facility] 7/12, spoke to spouse (Yamilet) over phone with palliative care team    Consults this admission: GI, Heme Onc, Palliative, Endocrinology, Cardiology, Nephrology   Assessment: 79 yo male with metastatic neuroendocrine pancreatic cancer (tx on hold) and PMH of CAD s/p PCI x3 with most recent stent 6/12/24 on Plavix and eliquis , chronic Afib on eliquis, orthostatic hypotension on midodrine, PAD , recent admission for dx cath on 6/24/24 presented from PCP's office for hypotension despite taking AM midodrine dose on 7/2, admitted to medicine for symptomatic hypotension and RAZIA. Per chart, on 7/8 PM, pt was noted to have acute onset of melena x5 and coffee ground emesis x2-3. RRT was called on 7/9 AM for hypotension, hgb dropped from 9.2 to 7.4 (admission baseline 10-11), FOBT +, pt was started on PPI IV and transfused 1 unit of pRBC. GI was consulted and underwent EGD on 7/9 afternoon. MICU was consulted for uncontrolled bleeding during EGD. During EGD, pt became hypotensive and was given phenylephrine, had A-fib with RVR s/p amiodarone. Now s/p IR GDA embolization, admitted to MICU for further moniring i.s.o hemorrhagic shock 2/2 GI bleed. On repeat EGD, oozing but no active bleeding was noted. Hospital course c/b c.diff, and patient has been placed on vancomycin.    Plan:     Neuro   # Baseline AOx3  Course:  - intubated 7/9  - weaned off propofol onto precedex  - extubated 7/15  Plan:  - On henny and vasopressin  - Increased midodrine to 30 mg q8 to try to wean off pressors (7/15)  - fentanyl prn for pain control (last dose 7/12/24)    CVS  # hemorrhagic shock   Course:  - RRT called 7/9 for hypotension   - 2/2 to Upper GI bleeds, urgently took to EGD, found bleeding ulcer that was not well controlled, underwent IR empiric embolization (7/9)  - s/p 5 pRBC prior to MICU, and 4 pRBC, 2 FFP, 2 platelets in MICU  - Restarted plavix 7/13/24 for new stent (6/12/24) per GI  - on henny, maintain MAP > 65; took off vaso and precedex (7/13/24), he got tachy to 118. Added precedex back. vasopressin added 7/14  - per spouse, pt's baseline BP is a little bit over 100  Findings:  - AM cortisol 17 (7/5), AM cortisol 37.1 (7/11)  - POCUS ECHO (7/16) showed no cardiogenic shock, no tamponade, low SV indeterminate IVC, no significant B lines, and some subdiaphragmatic fluid  Plan:  - Transfuse as needed  - Continue hneny (MAP >65) and vasopressin, try to wean  - Continue plavix  - Continue midodrine 30mg q8  - Continue droxidopa 200mg q8   - Albumin 5%  mL given today    #CAD s/p PCI x3, most recent stent 6/12/24, NURIA to pLAD   Course:  - restarted plavix 7/13/24 for new stent (6/12/24) per GI   Plan:  - c/w statin     #PAD  # A-fib on eliquis    - s/p successful DCCV 10/31   Plan:  - hold eliquis   - Per cards, "C/w of Sotalol 40mg PO daily (qTC wnl)"    PULM   #Intubated for airway protection prior to EGD/IR embolization  Course:  - Intubated 7/9  - Extubated 7/15  - On RA, satting well    #Sputum production  - Patient complaining of cough and phlegm  Plan:  - Airway clearance protocol    Renal/  #RAZIA   #ATN i.s.o hypotension  #metabolic acidosis   Plan:  - f/u nephro recs  - per nephro, "add sodium bicarb 650 mg tid"  - trend BUN/Cr   - monitor Is and Os     #Urinary retention  Plan:  - Started Doxazosin 2 mg daily (7/15)    GI  #Upper GI Bleed   Course:  - s/p 5 units pRBC prior to MICU and then 4:2:2 on 7/12  - CT A/P 7/9 - Active bleeding in the third portion of the duodenum. Progression of liver metastases.  - EGD 7/9 showed esophagitis, One non-bleeding duodenal ulcer with a clean ulcer base (Arjun Class III). One oozing duodenal ulcer with spurting hemorrhage (Arjun Class Ia). Treatment not successful. Treated with bipolar cautery.  - s/p IR embolization on 7/9  - placed OG tube per GI and started ensure clear liquid (see note from RD 7/13/24)  - OG tube removed during extubation (7/15/24)  -dysphagia screen failed  Plan:  - per GI recs, changed PPI gtt to PPI IV BID  - monitor Hgb, transfuse as needed   - hold eliquis   - NG tube in place  - Follow up speech and swallow evaluation  - continue to f/u with GI and IR     - Per GI, "Will need PPI BID for 8 weeks for grade D esophagitis and PUD"    #Diarrhea   Course:  - stopped maintenance fluids   - positive for c. diff (7/14)  - started vancomycin (7/14 -)   Plan:  - Continue vancomycin    ID  #leukocytosis   Course:  -  BCx 7/10 - NGTD  - Sputum Cx from ETT 7/13 showed numerous gram neg krishna  - Start zosyn (7/11 - )   - resent GI panel (previously indeterminate norovirus)  - per ID- no intervention needed regarding the PCR results  - started vancomycin for c. diff (7/14- )  Plan:  - Continue vancomycin for c. diff  - Continue zosyn    ENDOCRINE  #adrenal insufficiency   Findings:  -7/5 cortisol 17   - 7/11 cortisol 37.1     HEMATOLOGY/ONCOLOGY   # neuroendocrine tumor   - was on lanreotide then had POD in liver and started on peptide receptor radiotherapy (PRRT)- typically given every 8 weeks for 4 doses. He last received it 10/20/2023   - PET 5/28/24 shows new somatostatin avid osseous lesions; decreased intensely avid right supradiaphragmatic and upper abdominal nodes, suspicious for mets  Plan:  - per heme/onc, can resume octreotide once infection r/o   - Per heme/onc, "-- f/u with Dr. Sophia Prasad at [facility] [place], no plan for treatment inpatient, if clinically improves/stabilizes then can be considered for treatment outpatient"    SKINS:   - Lines/Tubes - PIV, cordis replaced with central line (7/14)    Ethics:   - Full Code   - [facility] 7/12, spoke to spouse (Yamilet) over phone with palliative care team    Consults this admission: GI, Heme Onc, Palliative, Endocrinology, Cardiology, Nephrology

## 2024-07-16 NOTE — CHART NOTE - NSCHARTNOTEFT_GEN_A_CORE
: Sravan Ruvalcaba, PGY-1    INDICATION:    PROCEDURE:  [X] LIMITED ECHO  [X] LIMITED CHEST  [ ] LIMITED RETROPERITONEAL  [ ] LIMITED ABDOMINAL  [ ] LIMITED DVT  [ ] NEEDLE GUIDANCE VASCULAR  [ ] NEEDLE GUIDANCE THORACENTESIS  [ ] NEEDLE GUIDANCE PARACENTESIS  [ ] NEEDLE GUIDANCE PERICARDIOCENTESIS  [ ] OTHER    FINDINGS:  Lungs: Irregular B-lines seen b/l lungs. Pleural effusion noted on left lung. Lung sliding b/l.  Heart: Normal LVSF. LVOT measuring ~11.1mm. LV > RV. No pericardial effusion. IVC ~21mm.    INTERPRETATION:  Irregular B-lines b/l  No cardiac limitations.    Images uploaded on [employer] Path : Sravan Ruvalcaba, PGY-1    INDICATION:    PROCEDURE:  [X] LIMITED ECHO  [X] LIMITED CHEST  [ ] LIMITED RETROPERITONEAL  [ ] LIMITED ABDOMINAL  [ ] LIMITED DVT  [ ] NEEDLE GUIDANCE VASCULAR  [ ] NEEDLE GUIDANCE THORACENTESIS  [ ] NEEDLE GUIDANCE PARACENTESIS  [ ] NEEDLE GUIDANCE PERICARDIOCENTESIS  [ ] OTHER    FINDINGS:  Lungs: Irregular B-lines seen b/l lungs. Pleural effusion noted on left lung. Lung sliding b/l.  Heart: Normal LVSF. LVOT measuring ~11.1mm. LV > RV. No pericardial effusion. IVC ~21mm.    INTERPRETATION:  Irregular B-lines b/l  No cardiac limitations.    Images uploaded on [employer] [facility]    Attending Attestation:  Agree with above. I was present for the entire procedure and have reviewed all images.

## 2024-07-16 NOTE — PROGRESS NOTE ADULT - CONVERSATION DETAILS
spoke with Yamilet this afternoon via phone. she is familiar with myself and role. Shared relief of extubation and shared hopefulness that the medical situation continues to improve. she reinforced goals are to continue all aggressive interventions. palliative contact info provided, emotional support provided.

## 2024-07-16 NOTE — PROGRESS NOTE ADULT - CRITICAL CARE ATTENDING COMMENT
Patient critically ill requiring frequent bedside visits.    Patient is a 78M extensive history including CAD s/p stent (most recent 6/12/24), Afib on AC, orthostatic hypotension on midodrine, and pancreatic neuroendocrine tumor who presents with hypotension due to acute GI bleed. The patient has had worsening shock state and multiorgan failure.    #Neuro - mental status improving and remains off sedation  - Wean sedation as able with hopes for extubation attempt  #Cardiovascular - shock state due to GIB now improving - was previously on 3 pressors but now remains on 2  - Maintain MAP > 65  - RVR while on Levo again - given Amio x 2 and transitioned back to Ady and Vaso  - NURIA recently was off DAPT for few days in setting of bleeding but Plavix restarted 7/14. Will monitor closely.  --- On 6/12/24 patient had PCI with NURIA to large RPL and patent LAD stent  - Orthostatic hypotension - Midodrine and Droxidopa  - Cardiology evaluation noted  - Restart home Sotalol  #Pulmonary - acute hypoxemic respiratory failure in setting of GI bleed  - Maintain O2 sat > 90%  - Extubated on 7/15 and saturating well with NC  #Gastro - massive GIB s/p EGD x 2 and empiric GDA embolization on 7/9  - 2nd EGD done 7/12 noted duodenal ulcers and clot but no active bleeding  - C. diff positive and started on PO Vanco - diarrhea worsened over last 24 hours - fluid resuscitation as needed  - Continue PPI  - Transaminitis improving - shock liver  - Oropharyngeal dysphagia - swallow  #Nephro - acute renal failure due to ATN relatively stable  - Monitor Cr and urine output. No longer requiring bicarb  #Infectious Disease - Continue antibiotics in setting of shock state and fevers  - Monitor temperature curve and follow-up cultures - ETT culture with Klebsiella  - C. diff positive - PO Vanco  #Endo - monitor FS and adjust insulin as needed for goal FS < 180  #Hem/Onc - pancreatic neuroendocrine tumor  - Outpatient follow-up at [facility] [place] - per Onc seeing patient at [facility] there does appear to be progression of disease  #DVT proph - SCD  #GOC - Full Code    Prognosis guarded. Patient critically ill requiring frequent bedside visits.    Patient is a 78M extensive history including CAD s/p stent (most recent 6/12/24), Afib on AC, orthostatic hypotension on midodrine, and pancreatic neuroendocrine tumor who presents with hypotension due to acute GI bleed. The patient has had worsening shock state and multiorgan failure.    #Neuro - mental status improving and remains off sedation  - Wean sedation as able with hopes for extubation attempt  #Cardiovascular - shock state due to GIB now improving - was previously on 3 pressors but now remains on 2  - Maintain MAP > 65  - RVR while on Levo again - given Amio x 2 and transitioned back to Ady and Vaso  - NURIA recently was off DAPT for few days in setting of bleeding but Plavix restarted 7/14. Will monitor closely.  --- On 6/12/24 patient had PCI with NURIA to large RPL and patent LAD stent  - Orthostatic hypotension - Midodrine and Droxidopa  - Cardiology evaluation noted  - Restart home Sotalol  #Pulmonary - acute hypoxemic respiratory failure in setting of GI bleed  - Maintain O2 sat > 90%  - Extubated on 7/15 and saturating well with NC  #Gastro - massive GIB s/p EGD x 2 and empiric GDA embolization on 7/9  - 2nd EGD done 7/12 noted duodenal ulcers and clot but no active bleeding  - C. diff positive and started on PO Vanco - diarrhea worsened over last 24 hours - fluid resuscitation as needed  - Continue PPI  - Transaminitis improving - shock liver  - Oropharyngeal dysphagia - swallow evaluation  #Nephro - acute renal failure due to ATN relatively stable  - Monitor Cr and urine output. No longer requiring bicarb  #Infectious Disease - Continue antibiotics in setting of shock state and fevers  - Monitor temperature curve and follow-up cultures - ETT culture with Klebsiella  - C. diff positive - PO Vanco  #Endo - monitor FS and adjust insulin as needed for goal FS < 180  #Hem/Onc - pancreatic neuroendocrine tumor  - Outpatient follow-up at [facility] [place] - per Onc seeing patient at [facility] there does appear to be progression of disease  #DVT proph - SCD  #GOC - Full Code    Prognosis guarded.

## 2024-07-16 NOTE — PROGRESS NOTE ADULT - ASSESSMENT
79 yo male with PMH of CAD s/p PCI x3 with most recent stent 6/12/24 on Plavix, chronic Afib on eliquis, orthostatic hypotension on midodrine, PAD, 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 Patient states since discharge on this past Saturday, he continued to feel ongoing generalized weakness and dizziness with positional changes despite compliance with home midodrine 15mg TID and holding torsemide as instructed as of day PTA . Admits to poor PO intake of fluids/solute due to ongoing issues with poor appetite and nausea with meals which is not new. In the ED, vitals notable for SBP 92-11s. Labs notable for elevated BUN/Cr 31/1.59 (from 30/1.18 on day of discharge 6/29/24). CXR with clear lungs. Abd US with innumerable intrahepatic echogenic masses consistent with known metastatic neuroendocrine tumor. Admitted to medicine for symptomatic hypotension and RAZIA.      1- RAZIA   2-  hypotension   3- hx chf   4- pericarditis   5- C Diff   6- acidosis       RAZIA in setting of hypotension, anemia -> GIB, hemorrhagic shock leading to oliguric ATN  received CT IV contrast, and emergently went to IR for mesenteric embolization 7/9  now creatinine is improving  non oliguric   continue with henny drip and midodrine 30 mg tid   vanco 125 mg q 6 enteral   acidosis  sodium bicarb 650 mg tid   strict I/O  monitor creatinine closely

## 2024-07-16 NOTE — PROGRESS NOTE ADULT - SUBJECTIVE AND OBJECTIVE BOX
Patient is a 78y old  Male who presents with a chief complaint of hypotension (16 Jul 2024 04:33)      MEDICATIONS  (STANDING):  albumin human  5% IVPB 500 milliLiter(s) IV Intermittent once  atorvastatin 80 milliGRAM(s) Oral at bedtime  chlorhexidine 2% Cloths 1 Application(s) Topical <User Schedule>  clopidogrel Tablet 75 milliGRAM(s) Enteral Tube daily  doxazosin 2 milliGRAM(s) Oral at bedtime  droxidopa 200 milliGRAM(s) Oral every 8 hours  folic acid Injectable 1 milliGRAM(s) IV Push daily  glucagon  Injectable 1 milliGRAM(s) IntraMuscular once  insulin lispro (ADMELOG) corrective regimen sliding scale   SubCutaneous every 6 hours  magnesium sulfate  IVPB 1 Gram(s) IV Intermittent once  midodrine 30 milliGRAM(s) Oral every 8 hours  pantoprazole  Injectable 40 milliGRAM(s) IV Push every 12 hours  phenylephrine    Infusion 0.25 MICROgram(s)/kG/Min (9.25 mL/Hr) IV Continuous <Continuous>  piperacillin/tazobactam IVPB.. 3.375 Gram(s) IV Intermittent every 8 hours  potassium chloride   Powder 40 milliEquivalent(s) Oral once  potassium chloride  20 mEq/100 mL IVPB 20 milliEquivalent(s) IV Intermittent every 2 hours  sodium bicarbonate 650 milliGRAM(s) Oral three times a day  sotalol. 40 milliGRAM(s) Oral every 24 hours  thiamine Injectable 100 milliGRAM(s) IV Push daily  vancomycin    Solution 125 milliGRAM(s) Oral every 6 hours  vasopressin Infusion 0.04 Unit(s)/Min (6 mL/Hr) IV Continuous <Continuous>    MEDICATIONS  (PRN):  acetaminophen     Tablet .. 650 milliGRAM(s) Oral every 6 hours PRN Temp greater or equal to 38C (100.4F)  fentaNYL    Injectable 50 MICROGram(s) IV Push every 4 hours PRN Severe Pain (7 - 10)          Vital Signs Last 24 Hrs  T(C): 37.1 (16 Jul 2024 07:00), Max: 37.4 (15 Jul 2024 16:00)  T(F): 98.8 (16 Jul 2024 07:00), Max: 99.3 (15 Jul 2024 16:00)  HR: 134 (16 Jul 2024 11:15) (79 - 152)  BP: 93/63 (16 Jul 2024 11:15) (73/50 - 144/73)  BP(mean): 73 (16 Jul 2024 11:15) (54 - 100)  RR: 25 (16 Jul 2024 11:15) (16 - 37)  SpO2: 98% (16 Jul 2024 11:15) (93% - 100%)        PE  NAD  Lethargic  Anicteric, MMM  Abd soft, NT, ND  No c/c/e  No rash grossly                          11.4   11.84 )-----------( 89       ( 16 Jul 2024 02:16 )             33.5       07-16    146<H>  |  112<H>  |  72<H>  ----------------------------<  148<H>  3.3<L>   |  17<L>  |  1.66<H>    Ca    7.4<L>      16 Jul 2024 02:16  Phos  3.6     07-16  Mg     1.8     07-16    TPro  4.5<L>  /  Alb  2.0<L>  /  TBili  1.1  /  DBili  x   /  AST  87<H>  /  ALT  72<H>  /  AlkPhos  197<H>  07-16

## 2024-07-17 NOTE — SWALLOW FEES ASSESSMENT ADULT - ASPIRATION AFTER SWALLOW - SILENT
cued cough/reswallow ineffective in ejecting subglottic material/Mild Trilobed Flap Text: The defect edges were debeveled with a #15 scalpel blade.  Given the location of the defect and the proximity to free margins a trilobed flap was deemed most appropriate.  Using a sterile surgical marker, an appropriate trilobed flap drawn around the defect.    The area thus outlined was incised deep to adipose tissue with a #15 scalpel blade.  The skin margins were undermined to an appropriate distance in all directions utilizing iris scissors.

## 2024-07-17 NOTE — PROGRESS NOTE ADULT - CRITICAL CARE ATTENDING COMMENT
Patient critically ill requiring frequent bedside visits.    Patient is a 78M extensive history including CAD s/p stent (most recent 6/12/24), Afib on AC, orthostatic hypotension on midodrine, and pancreatic neuroendocrine tumor who presents with hypotension due to acute GI bleed. The patient has had worsening shock state and multiorgan failure.    #Neuro - mental status improving and remains off sedation  - Wean sedation as able with hopes for extubation attempt  #Cardiovascular - shock state due to GIB now improving - vasopressor requirements improving  - Maintain MAP > 65  - RVR while on Levo again - given Amio pushes and now completing Amio load IV to PO  - NURIA recently was off DAPT for few days in setting of bleeding but Plavix restarted 7/14. Will monitor closely.  --- On 6/12/24 patient had PCI with NURIA to large RPL and patent LAD stent  - Orthostatic hypotension - Midodrine and Droxidopa  - Cardiology evaluation noted  - D/C statin for short time and restart when LFTs downtrend again  #Pulmonary - acute hypoxemic respiratory failure in setting of GI bleed  - Maintain O2 sat > 90%  - Extubated on 7/15 and saturating well with NC  - blood gas to follow-up acidosis noted on midnight VBG  #Gastro - massive GIB s/p EGD x 2 and empiric GDA embolization on 7/9  - 2nd EGD done 7/12 noted duodenal ulcers and clot but no active bleeding  - C. diff positive and started on PO Vanco - diarrhea slightly improved over last 24 hours - fluid resuscitation as needed  - Continue PPI  - Transaminitis stable - check RUQ sono given fevers and LFTs  - Oropharyngeal dysphagia - swallow evaluation pending. Continue NGT feeds for now  #Nephro - acute renal failure due to ATN relatively stable - may need to consider diuresis depending on volume status - will monitor closely  - Monitor Cr and urine output which are both improving today  #Infectious Disease - Continue antibiotics in setting of shock state and fevers  - Monitor temperature curve and follow-up cultures - ETT culture with Klebsiella awaiting sensitivities  - C. diff positive - PO Vanco - diarrhea reportedly improved today  - Will try and remove TLC if adequate peripheral access  #Endo - monitor FS and adjust insulin as needed for goal FS < 180  - Check repeat labs and BHB  - FS may be increased related to feeds which are clear Ensure - advance diet if not in DKA  #Hem/Onc - pancreatic neuroendocrine tumor  - [place] [facility] [place] - per Onc seeing patient at [facility] there does appear to be progression of disease  #DVT proph - SCD  #GOC - Full Code    Prognosis guarded.

## 2024-07-17 NOTE — PROGRESS NOTE ADULT - ASSESSMENT
Assessment	  Assessment: 77 yo male with metastatic neuroendocrine pancreatic cancer (tx on hold) and PMH of CAD s/p PCI x3 with most recent stent 6/12/24 on Plavix and eliquis , chronic Afib on eliquis, orthostatic hypotension on midodrine, PAD , recent admission for dx cath on 6/24/24 presented from PCP's office for hypotension despite taking AM midodrine dose on 7/2, admitted to medicine for symptomatic hypotension and RAZIA. Per chart, on 7/8 PM, pt was noted to have acute onset of melena x5 and coffee ground emesis x2-3. RRT was called on 7/9 AM for hypotension, hgb dropped from 9.2 to 7.4 (admission baseline 10-11), FOBT +, pt was started on PPI IV and transfused 1 unit of pRBC. GI was consulted and underwent EGD on 7/9 afternoon. MICU was consulted for uncontrolled bleeding during EGD. During EGD, pt became hypotensive and was given phenylephrine, had A-fib with RVR s/p amiodarone. Now s/p IR GDA embolization, admitted to MICU for further moniring i.s.o hemorrhagic shock 2/2 GI bleed. On repeat EGD, oozing but no active bleeding was noted. Hospital course c/b c.diff, and patient has been placed on vancomycin.    Plan:     NEUROLOGIC  # Baseline AOx3  Course:  - intubated 7/9  - weaned off propofol onto precedex  - extubated 7/15  Plan:  - fentanyl prn for pain control (last dose 7/12/24)    CARDIOVASCULAR  # hemorrhagic shock   Course:  - RRT called 7/9 for hypotension   - 2/2 to Upper GI bleeds, urgently took to EGD, found bleeding ulcer that was not well controlled, underwent IR empiric embolization (7/9)  - s/p 5 pRBC prior to MICU, and 4 pRBC, 2 FFP, 2 platelets in MICU  - Restarted plavix 7/13/24 for new stent (6/12/24) per GI  - on henny, maintain MAP > 65; took off vaso and precedex (7/13/24), he got tachy to 118. Added precedex back. vasopressin added 7/14  - per spouse, pt's baseline BP is a little bit over 100  Findings:  - AM cortisol 17 (7/5), AM cortisol 37.1 (7/11)  - POCUS ECHO (7/16) showed no cardiogenic shock, no tamponade, low SV indeterminate IVC, irregular B lines but not concerning for pulmonary edema, and some subdiaphragmatic fluid  Plan:  - Transfuse as needed  - Continue henny and vasopressin, try to wean (goal MAP >65)   - Continue plavix 75 mg daily  - Continue midodrine 30mg q8  - Continue droxidopa 200mg q8   - Albumin 5%  mL given 7/16    #CAD s/p PCI x3, most recent stent 6/12/24, NURIA to pLAD (UNCHANGED)  Course:  - restarted plavix 7/13/24 for new stent (6/12/24) per GI   Plan:  - c/w statin     #PAD  # A-fib on eliquis WORSENED  - s/p successful DCCV 10/31   - In RVR 7/16  - Cards recommended resuming home dose sotalol 40 mg PO (qTC wnl)  - s/p 1 dose sotalol but RVR persisted  - s/p 2 doses of amio overnight (7/16)  - attempted to restart home sotalol, but patient HR returned to 150s in afternoon (7/16)  - Amio restarted amio 150 mg followed by loading dose of 1 mg/min for 6 hours and 0.5 mg/min for 18 hours (7/16)  Plan:  - Hold eliquis   - Continue amio 0.5 mg/min for total 18 hours  - Hold home sotalol    PULMONARY  #Intubated for airway protection prior to EGD/IR embolization (RESOLVED)  Course:  - Intubated 7/9  - Extubated 7/15  - On RA, satting well    #Sputum production  - Patient complaining of cough and phlegm  - Patient able to self-suction  Plan:  - Airway clearance protocol    RENAL/  #RAZIA   #ATN i.s.o hypotension  #metabolic acidosis   - Baseline Cr  Findings:  - pH 7.37 (7/16)  - Cr 1.53 (7/16)  Plan:  - f/u nephro recs  - Continue sodium bicarb 650 mg tid, added per nephro  - trend BUN/Cr   - monitor Is and Os     #Urinary retention  Plan:  - Started Doxazosin 2 mg daily (7/15)  - Monitor I/Os    GI  #Upper GI Bleed (RESOLVED)  Course:  - s/p 5 units pRBC prior to MICU and then 4:2:2 on 7/12  - CT A/P 7/9 - Active bleeding in the third portion of the duodenum. Progression of liver metastases.  - EGD 7/9 showed esophagitis, One non-bleeding duodenal ulcer with a clean ulcer base (Arjun Class III). One oozing duodenal ulcer with spurting hemorrhage (Arjun Class Ia). Treatment not successful. Treated with bipolar cautery.  - s/p IR embolization on 7/9  - placed OG tube per GI and started ensure clear liquid (see note from RD 7/13/24)  - OG tube removed during extubation (7/15/24)  -dysphagia screen failed  Plan:  - per GI recs, changed PPI gtt to PPI IV BID  - monitor Hgb, transfuse as needed   - hold eliquis   - NG tube in place  - FEES once hemodynamically stable per speech and swallow  - continue to f/u with GI and IR     - Per GI, "Will need PPI BID for 8 weeks for grade D esophagitis and PUD"    #Diarrhea (WORSENED)  Course:  - stopped maintenance fluids   - positive for c. diff (7/14)  - started vancomycin (7/14 -)   - having worsening diarrhea, no melena (7/16)  Plan:  - Continue vancomycin  - Fluids as necessary to compensate for volume loss 2/2 diarrhea    INFECTIOUS DISEASE  #leukocytosis (UNCHANGED)  Findings/Course:  -  BCx 7/10 - NGTD  - Sputum Cx from ETT 7/13 showed numerous gram neg krishna (Klebsiella oxytoca/raoultella ornithinolytica)  - Started zosyn for empiric treatment (7/11 - )   - GI panel: previously indeterminate norovirus (7/8), negative (7/13)     - per ID- no intervention needed regarding the PCR results  - c. diff + (7/14)  - started vancomycin for c. diff (7/14- )  Plan:  - Continue vancomycin 125 mg q6 for c. diff  - Continue zosyn 3.375 g IV until 7/18 evening    ENDOCRINE  #adrenal insufficiency   Findings:  -7/5 cortisol 17   - 7/11 cortisol 37.1     HEMATOLOGY/ONCOLOGY   # neuroendocrine tumor   - was on lanreotide then had POD in liver and started on peptide receptor radiotherapy (PRRT)- typically given every 8 weeks for 4 doses. He last received it 10/20/2023   - PET 5/28/24 shows new somatostatin avid osseous lesions; decreased intensely avid right supradiaphragmatic and upper abdominal nodes, suspicious for mets  Plan:  - per heme/onc:    - can resume octreotide 150 mcg bid once infection r/o      - can check factor levels V, VIII, X     - "-- f/u with Dr. Sophia Prasad at [facility] [place] after discharge, no plan for treatment inpatient, if clinically improves/stabilizes then can be considered for treatment outpatient"    SKINS:   - Lines/Tubes - PIV, cordis replaced with central line (7/14)    Ethics:   - Full Code   - GOC 7/12, spoke to spouse (Yamilet) over phone with palliative care team, discussed GOC again on 7/16    Consults this admission: GI, Heme Onc, Palliative, Endocrinology, Cardiology, Nephrology Assessment	  Assessment: 77 yo male with metastatic neuroendocrine pancreatic cancer (tx on hold) and PMH of CAD s/p PCI x3 with most recent stent 6/12/24 on Plavix and eliquis , chronic Afib on eliquis, orthostatic hypotension on midodrine, PAD , recent admission for dx cath on 6/24/24 presented from PCP's office for hypotension despite taking AM midodrine dose on 7/2, admitted to medicine for symptomatic hypotension and RAZIA. Per chart, on 7/8 PM, pt was noted to have acute onset of melena x5 and coffee ground emesis x2-3. RRT was called on 7/9 AM for hypotension, hgb dropped from 9.2 to 7.4 (admission baseline 10-11), FOBT +, pt was started on PPI IV and transfused 1 unit of pRBC. GI was consulted and underwent EGD on 7/9 afternoon. MICU was consulted for uncontrolled bleeding during EGD. During EGD, pt became hypotensive and was given phenylephrine, had A-fib with RVR s/p amiodarone. Now s/p IR GDA embolization, admitted to MICU for further moniring i.s.o hemorrhagic shock 2/2 GI bleed. On repeat EGD, oozing but no active bleeding was noted. Hospital course c/b c.diff, and patient has been placed on vancomycin.    Plan:     NEUROLOGIC  # Baseline AOx3  Course:  - intubated 7/9  - weaned off propofol onto precedex  - extubated 7/15  Plan:  - fentanyl prn for pain control (last dose 7/12/24)    CARDIOVASCULAR  # hemorrhagic shock   Course:  - RRT called 7/9 for hypotension   - 2/2 to Upper GI bleeds, urgently took to EGD, found bleeding ulcer that was not well controlled, underwent IR empiric embolization (7/9)  - s/p 5 pRBC prior to MICU, and 4 pRBC, 2 FFP, 2 platelets in MICU  - Restarted plavix 7/13/24 for new stent (6/12/24) per GI  - on henny, maintain MAP > 65; took off vaso and precedex (7/13/24), he got tachy to 118. Added precedex back. vasopressin added 7/14  - per spouse, pt's baseline BP is a little bit over 100  - Albumin 5%  mL given 7/16  Findings:  - AM cortisol 17 (7/5), AM cortisol 37.1 (7/11)  - POCUS ECHO (7/16) showed no cardiogenic shock, no tamponade, low SV indeterminate IVC, irregular B lines but not concerning for pulmonary edema, and some subdiaphragmatic fluid  Plan:  - Transfuse as needed  - Continue henny and vasopressin, try to wean (goal MAP >65)   - Continue plavix 75 mg daily  - Continue midodrine 30mg q8  - Continue droxidopa 200mg q8       #CAD s/p PCI x3, most recent stent 6/12/24, NURIA to pLAD (UNCHANGED)  Course:  - restarted plavix 7/13/24 for new stent (6/12/24) per GI   Plan:  - c/w statin     #PAD  # A-fib on eliquis WORSENED  - s/p successful DCCV 10/31   - In RVR 7/16  - Cards recommended resuming home dose sotalol 40 mg PO (qTC wnl)  - s/p 1 dose sotalol but RVR persisted  - s/p 2 doses of amio overnight (7/16)  - attempted to restart home sotalol, but patient HR returned to 150s in afternoon (7/16)  - Amio restarted amio 150 mg followed by loading dose of 1 mg/min for 6 hours and 0.5 mg/min for 18 hours (7/16)  Plan:  - Hold eliquis   - Continue amio 0.5 mg/min for total 18 hours (to finish ~1530 today)  - Hold home sotalol    PULMONARY  #Intubated for airway protection prior to EGD/IR embolization (RESOLVED)  Course:  - Intubated 7/9  - Extubated 7/15  - On RA, satting well    #Sputum production  - Patient complaining of cough and phlegm  - Patient able to self-suction  Plan:  - Airway clearance protocol    RENAL/  #RAZIA   #ATN i.s.o hypotension  #metabolic acidosis   - Baseline Cr  Findings:  - pH 7.37 (7/16)  - Cr 1.53 (7/16)  Plan:  - f/u nephro recs  - Continue sodium bicarb 650 mg tid, added per nephro  - trend BUN/Cr   - monitor Is and Os     #Urinary retention  Plan:  - Started Doxazosin 2 mg daily (7/15)  - Monitor I/Os    GI  #Upper GI Bleed (RESOLVED)  Course:  - s/p 5 units pRBC prior to MICU and then 4:2:2 on 7/12  - CT A/P 7/9 - Active bleeding in the third portion of the duodenum. Progression of liver metastases.  - EGD 7/9 showed esophagitis, One non-bleeding duodenal ulcer with a clean ulcer base (Arjun Class III). One oozing duodenal ulcer with spurting hemorrhage (Arjun Class Ia). Treatment not successful. Treated with bipolar cautery.  - s/p IR embolization on 7/9  - placed OG tube per GI and started ensure clear liquid (see note from RD 7/13/24)  - OG tube removed during extubation (7/15/24)  -dysphagia screen failed  Plan:  - per GI recs, changed PPI gtt to PPI IV BID  - monitor Hgb, transfuse as needed   - hold eliquis   - NG tube in place  - FEES once hemodynamically stable per speech and swallow  - continue to f/u with GI and IR     - Per GI, "Will need PPI BID for 8 weeks for grade D esophagitis and PUD"    #Diarrhea (WORSENED)  Course:  - stopped maintenance fluids   - positive for c. diff (7/14)  - started vancomycin (7/14 -)   - having worsening diarrhea, no melena (7/16)  Plan:  - Continue vancomycin  - Fluids as necessary to compensate for volume loss 2/2 diarrhea    INFECTIOUS DISEASE  #leukocytosis (UNCHANGED)  Findings/Course:  -  BCx 7/10 - NGTD  - Sputum Cx from ETT 7/13 showed numerous gram neg krishna (Klebsiella oxytoca/raoultella ornithinolytica)  - Started zosyn for empiric treatment (7/11 - )   - GI panel: previously indeterminate norovirus (7/8), negative (7/13)     - per ID- no intervention needed regarding the PCR results  - c. diff + (7/14)  - started vancomycin for c. diff (7/14- )  Plan:  - Continue vancomycin 125 mg q6 for c. diff  - Continue zosyn 3.375 g IV until 7/18 evening    ENDOCRINE  #adrenal insufficiency   Findings:  -7/5 cortisol 17   - 7/11 cortisol 37.1     HEMATOLOGY/ONCOLOGY   # neuroendocrine tumor   - was on lanreotide then had POD in liver and started on peptide receptor radiotherapy (PRRT)- typically given every 8 weeks for 4 doses. He last received it 10/20/2023   - PET 5/28/24 shows new somatostatin avid osseous lesions; decreased intensely avid right supradiaphragmatic and upper abdominal nodes, suspicious for mets  Plan:  - per heme/onc:    - can resume octreotide 150 mcg bid once infection r/o      - can check factor levels V, VIII, X     - "-- f/u with Dr. Sophia Prasad at [facility] after discharge, no plan for treatment inpatient, if clinically improves/stabilizes then can be considered for treatment outpatient"    SKINS:   - Lines/Tubes - PIV, cordis replaced with central line (7/14)    Ethics:   - Full Code   - GOC 7/12, spoke to spouse (Yamilet) over phone with palliative care team, discussed GOC again on 7/16    Consults this admission: GI, Heme Onc, Palliative, Endocrinology, Cardiology, Nephrology Assessment	  Assessment: 77 yo male with metastatic neuroendocrine pancreatic cancer (tx on hold) and PMH of CAD s/p PCI x3 with most recent stent 6/12/24 on Plavix and eliquis , chronic Afib on eliquis, orthostatic hypotension on midodrine, PAD , recent admission for dx cath on 6/24/24 presented from PCP's office for hypotension despite taking AM midodrine dose on 7/2, admitted to medicine for symptomatic hypotension and RAZIA. Per chart, on 7/8 PM, pt was noted to have acute onset of melena x5 and coffee ground emesis x2-3. RRT was called on 7/9 AM for hypotension, hgb dropped from 9.2 to 7.4 (admission baseline 10-11), FOBT +, pt was started on PPI IV and transfused 1 unit of pRBC. GI was consulted and underwent EGD on 7/9 afternoon. MICU was consulted for uncontrolled bleeding during EGD. During EGD, pt became hypotensive and was given phenylephrine, had A-fib with RVR s/p amiodarone. Now s/p IR GDA embolization, admitted to MICU for further moniring i.s.o hemorrhagic shock 2/2 GI bleed. On repeat EGD, oozing but no active bleeding was noted. Hospital course c/b c.diff, and patient has been placed on vancomycin.    Plan:     NEUROLOGIC  # Baseline AOx3  Course:  - intubated 7/9  - weaned off propofol onto precedex  - extubated 7/15  Plan:  - fentanyl prn for pain control (last dose 7/12/24)    CARDIOVASCULAR  # hemorrhagic shock   Course:  - RRT called 7/9 for hypotension   - 2/2 to Upper GI bleeds, urgently took to EGD, found bleeding ulcer that was not well controlled, underwent IR empiric embolization (7/9)  - s/p 5 pRBC prior to MICU, and 4 pRBC, 2 FFP, 2 platelets in MICU  - Restarted plavix 7/13/24 for new stent (6/12/24) per GI  - on henny, maintain MAP > 65; took off vaso and precedex (7/13/24), he got tachy to 118. Added precedex back. vasopressin added 7/14  - per spouse, pt's baseline BP is a little bit over 100  - Albumin 5%  mL given 7/16  Findings:  - AM cortisol 17 (7/5), AM cortisol 37.1 (7/11)  - POCUS ECHO (7/16) showed no cardiogenic shock, no tamponade, low SV indeterminate IVC, irregular B lines but not concerning for pulmonary edema, and some subdiaphragmatic fluid  Plan:  - Transfuse as needed  - Continue henny and vasopressin, try to wean (goal MAP >65)   - Continue plavix 75 mg daily  - Continue midodrine 30mg q8  - Continue droxidopa 200mg q8       #CAD s/p PCI x3, most recent stent 6/12/24, NURIA to pLAD (UNCHANGED)  Course:  - restarted plavix 7/13/24 for new stent (6/12/24) per GI   Plan:  - c/w statin     #PAD  # A-fib on eliquis WORSENED  - s/p successful DCCV 10/31   - In RVR 7/16  - Cards recommended resuming home dose sotalol 40 mg PO (qTC wnl)  - s/p 1 dose sotalol but RVR persisted  - s/p 2 doses of amio overnight (7/16)  - attempted to restart home sotalol, but patient HR returned to 150s in afternoon (7/16)  - Amio restarted amio 150 mg followed by loading dose of 1 mg/min for 6 hours and 0.5 mg/min for 18 hours (7/16)  - received amio 150 overnight (7/17)  Plan:  - Hold eliquis   - Continue amio 0.5 mg/min for total 18 hours (to finish ~1530 today)  - Hold home sotalol    PULMONARY  #Intubated for airway protection prior to EGD/IR embolization (RESOLVED)  Course:  - Intubated 7/9  - Extubated 7/15  - On RA, satting well    #Sputum production  - Patient complaining of cough and phlegm  - Patient able to self-suction  Plan:  - Airway clearance protocol    RENAL/  #RAZIA   #ATN i.s.o hypotension  #metabolic acidosis   - Baseline Cr  Findings:  - pH 7.37 (7/16)  - Cr 1.53 (7/16)  Plan:  - f/u nephro recs  - Continue sodium bicarb 650 mg tid, added per nephro  - trend BUN/Cr   - monitor Is and Os     #Urinary retention  Plan:  - Started Doxazosin 2 mg daily (7/15)  - Monitor I/Os    GASTROINTESTINAL  #Upper GI Bleed (RESOLVED)  Course:  - s/p 5 units pRBC prior to MICU and then 4:2:2 on 7/12  - CT A/P 7/9 - Active bleeding in the third portion of the duodenum. Progression of liver metastases.  - EGD 7/9 showed esophagitis, One non-bleeding duodenal ulcer with a clean ulcer base (Arjun Class III). One oozing duodenal ulcer with spurting hemorrhage (Arjun Class Ia). Treatment not successful. Treated with bipolar cautery.  - s/p IR embolization on 7/9  - placed OG tube per GI and started ensure clear liquid (see note from RD 7/13/24)  - OG tube removed during extubation (7/15/24)  -dysphagia screen failed  Plan:  - per GI recs, changed PPI gtt to PPI IV BID  - monitor Hgb, transfuse as needed   - hold eliquis   - NG tube in place  - FEES once hemodynamically stable per speech and swallow  - continue to f/u with GI and IR     - Per GI, "Will need PPI BID for 8 weeks for grade D esophagitis and PUD"    #Diarrhea (WORSENED)  Course:  - stopped maintenance fluids   - positive for c. diff (7/14)  - started vancomycin (7/14 -)   - having worsening diarrhea, no melena (7/16)  Plan:  - Continue vancomycin  - Fluids as necessary to compensate for volume loss 2/2 diarrhea    INFECTIOUS DISEASE  #leukocytosis (UNCHANGED)  Findings/Course:  -  BCx 7/10 - NGTD  - Sputum Cx from ETT 7/13 showed numerous gram neg krishna (Klebsiella oxytoca/raoultella ornithinolytica)  - Started zosyn for empiric treatment (7/11 - )   - GI panel: previously indeterminate norovirus (7/8), negative (7/13)     - per ID- no intervention needed regarding the PCR results  - c. diff + (7/14)  - started vancomycin for c. diff (7/14- )  Plan:  - Continue vancomycin 125 mg q6 for c. diff  - Continue zosyn 3.375 g IV until 7/18 evening    ENDOCRINE  #adrenal insufficiency   Findings:  -7/5 cortisol 17   - 7/11 cortisol 37.1     HEMATOLOGY/ONCOLOGY   # neuroendocrine tumor   - was on lanreotide then had POD in liver and started on peptide receptor radiotherapy (PRRT)- typically given every 8 weeks for 4 doses. He last received it 10/20/2023   - PET 5/28/24 shows new somatostatin avid osseous lesions; decreased intensely avid right supradiaphragmatic and upper abdominal nodes, suspicious for mets  Plan:  - per heme/onc:    - can resume octreotide 150 mcg bid once infection r/o      - can check factor levels V, VIII, X     - "-- f/u with Dr. Sophia Prasad at [facility] [place] after discharge, no plan for treatment inpatient, if clinically improves/stabilizes then can be considered for treatment outpatient"    SKINS:   - Lines/Tubes - PIV, cordis replaced with central line (7/14)    Ethics:   - Full Code   - GOC 7/12, spoke to spouse (Yamilet) over phone with palliative care team, discussed GOC again on 7/16    Consults this admission: GI, Heme Onc, Palliative, Endocrinology, Cardiology, Nephrology Assessment	  Assessment: 79 yo male with metastatic neuroendocrine pancreatic cancer (tx on hold) and PMH of CAD s/p PCI x3 with most recent stent 6/12/24 on Plavix and eliquis , chronic Afib on eliquis, orthostatic hypotension on midodrine, PAD , recent admission for dx cath on 6/24/24 presented from PCP's office for hypotension despite taking AM midodrine dose on 7/2, admitted to medicine for symptomatic hypotension and RAZIA. Per chart, on 7/8 PM, pt was noted to have acute onset of melena x5 and coffee ground emesis x2-3. RRT was called on 7/9 AM for hypotension, hgb dropped from 9.2 to 7.4 (admission baseline 10-11), FOBT +, pt was started on PPI IV and transfused 1 unit of pRBC. GI was consulted and underwent EGD on 7/9 afternoon. MICU was consulted for uncontrolled bleeding during EGD. During EGD, pt became hypotensive and was given phenylephrine, had A-fib with RVR s/p amiodarone. Now s/p IR GDA embolization, admitted to MICU for further moniring i.s.o hemorrhagic shock 2/2 GI bleed. On repeat EGD, oozing but no active bleeding was noted. Hospital course c/b c.diff, and patient has been placed on vancomycin.    Plan:     NEUROLOGIC  # Baseline AOx3  Course:  - intubated 7/9  - weaned off propofol onto precedex  - extubated 7/15  Plan:  - fentanyl prn for pain control (last dose 7/12/24)    CARDIOVASCULAR  # hemorrhagic shock   Course:  - RRT called 7/9 for hypotension   - 2/2 to Upper GI bleeds, urgently took to EGD, found bleeding ulcer that was not well controlled, underwent IR empiric embolization (7/9)  - s/p 5 pRBC prior to MICU, and 4 pRBC, 2 FFP, 2 platelets in MICU  - Restarted plavix 7/13/24 for new stent (6/12/24) per GI  - on henny, maintain MAP > 65; took off vaso and precedex (7/13/24), he got tachy to 118. Added precedex back. vasopressin added 7/14  - per spouse, pt's baseline BP is a little bit over 100  - Albumin 5%  mL given 7/16  - Vasopressin stopped (7/17)  Findings:  - AM cortisol 17 (7/5), AM cortisol 37.1 (7/11)  - POCUS ECHO (7/16) showed no cardiogenic shock, no tamponade, low SV indeterminate IVC, irregular B lines but not concerning for pulmonary edema, and some subdiaphragmatic fluid  Plan:  - Transfuse as needed  - Continue henny, try to wean (goal MAP >65)   - Continue plavix 75 mg daily  - Continue midodrine 30mg q8  - Continue droxidopa 200mg q8       #CAD s/p PCI x3, most recent stent 6/12/24, NURIA to pLAD (UNCHANGED)  Course:  - restarted plavix 7/13/24 for new stent (6/12/24) per GI   Plan:  - c/w statin     #PAD  # A-fib on eliquis WORSENED  - s/p successful DCCV 10/31   - In RVR 7/16  - Cards recommended resuming home dose sotalol 40 mg PO (qTC wnl)  - s/p 1 dose sotalol but RVR persisted  - s/p 2 doses of amio overnight (7/16)  - attempted to restart home sotalol, but patient HR returned to 150s in afternoon (7/16)  - Amio restarted amio 150 mg followed by loading dose of 1 mg/min for 6 hours and 0.5 mg/min for 18 hours (7/16)  - received amio 150 overnight (7/17)  Plan:  - Hold eliquis   - Continue amio 0.5 mg/min for total 18 hours (to finish ~1530 today)  - Hold home sotalol    PULMONARY  #Intubated for airway protection prior to EGD/IR embolization (RESOLVED)  Course:  - Intubated 7/9  - Extubated 7/15  - On RA, satting well    #Sputum production  - Patient complaining of cough and phlegm  - Patient able to self-suction  Plan:  - Airway clearance protocol    RENAL/  #RAZIA   #ATN i.s.o hypotension  #metabolic acidosis   - Baseline Cr  Findings:  - pH 7.37 (7/16)  - Cr 1.53 (7/16)  Plan:  - f/u nephro recs  - Continue sodium bicarb 650 mg tid, added per nephro  - trend BUN/Cr   - monitor Is and Os     #Urinary retention  Plan:  - Started Doxazosin 2 mg daily (7/15)  - Monitor I/Os    GASTROINTESTINAL  #Upper GI Bleed (RESOLVED)  Course:  - s/p 5 units pRBC prior to MICU and then 4:2:2 on 7/12  - CT A/P 7/9 - Active bleeding in the third portion of the duodenum. Progression of liver metastases.  - EGD 7/9 showed esophagitis, One non-bleeding duodenal ulcer with a clean ulcer base (Arjun Class III). One oozing duodenal ulcer with spurting hemorrhage (Arjun Class Ia). Treatment not successful. Treated with bipolar cautery.  - s/p IR embolization on 7/9  - placed OG tube per GI and started ensure clear liquid (see note from RD 7/13/24)  - OG tube removed during extubation (7/15/24)  -dysphagia screen failed  Plan:  - per GI recs, changed PPI gtt to PPI IV BID  - monitor Hgb, transfuse as needed   - hold eliquis   - NG tube in place  - FEES once hemodynamically stable per speech and swallow  - continue to f/u with GI and IR     - Per GI, "Will need PPI BID for 8 weeks for grade D esophagitis and PUD"    #Diarrhea (WORSENED)  Course:  - stopped maintenance fluids   - positive for c. diff (7/14)  - started vancomycin (7/14 -)   - having worsening diarrhea, no melena (7/16)  Plan:  - Continue vancomycin  - Fluids as necessary to compensate for volume loss 2/2 diarrhea    INFECTIOUS DISEASE  #leukocytosis (UNCHANGED)  Findings/Course:  -  BCx 7/10 - NGTD  - Sputum Cx from ETT 7/13 showed numerous gram neg krishna (Klebsiella oxytoca/raoultella ornithinolytica)  - Started zosyn for empiric treatment (7/11 - )   - GI panel: previously indeterminate norovirus (7/8), negative (7/13)     - per ID- no intervention needed regarding the PCR results  - c. diff + (7/14)  - started vancomycin for c. diff (7/14- )  Plan:  - Continue vancomycin 125 mg q6 for c. diff  - Continue zosyn 3.375 g IV until 7/18 evening    ENDOCRINE  #adrenal insufficiency   Findings:  -7/5 cortisol 17   - 7/11 cortisol 37.1     HEMATOLOGY/ONCOLOGY   # neuroendocrine tumor   - was on lanreotide then had POD in liver and started on peptide receptor radiotherapy (PRRT)- typically given every 8 weeks for 4 doses. He last received it 10/20/2023   - PET 5/28/24 shows new somatostatin avid osseous lesions; decreased intensely avid right supradiaphragmatic and upper abdominal nodes, suspicious for mets  Plan:  - per heme/onc:    - can resume octreotide 150 mcg bid once infection r/o      - can check factor levels V, VIII, X     - "-- f[place] with [place]. Sophia Prasad [place] [facility] [place], no plan for treatment inpatient, if clinically improves/stabilizes then can be considered for treatment outpatient"    SKINS:   - Lines/Tubes - PIV, cordis replaced with central line (7/14)  Plan:  - Remove central line today, transition henny to PIV (7/17)    Ethics:   - Full Code   - GOC 7/12, spoke to spouse (Yamilet) over phone with palliative care team, discussed GOC again on 7/16    Consults this admission: GI, Heme Onc, Palliative, Endocrinology, Cardiology, Nephrology Assessment	  Assessment: 77 yo male with metastatic neuroendocrine pancreatic cancer (tx on hold) and PMH of CAD s/p PCI x3 with most recent stent 6/12/24 on Plavix and eliquis , chronic Afib on eliquis, orthostatic hypotension on midodrine, PAD , recent admission for dx cath on 6/24/24 presented from PCP's office for hypotension despite taking AM midodrine dose on 7/2, admitted to medicine for symptomatic hypotension and RAZIA. Per chart, on 7/8 PM, pt was noted to have acute onset of melena x5 and coffee ground emesis x2-3. RRT was called on 7/9 AM for hypotension, hgb dropped from 9.2 to 7.4 (admission baseline 10-11), FOBT +, pt was started on PPI IV and transfused 1 unit of pRBC. GI was consulted and underwent EGD on 7/9 afternoon. MICU was consulted for uncontrolled bleeding during EGD. During EGD, pt became hypotensive and was given phenylephrine, had A-fib with RVR s/p amiodarone. Now s/p IR GDA embolization, admitted to MICU for further moniring i.s.o hemorrhagic shock 2/2 GI bleed. On repeat EGD, oozing but no active bleeding was noted. Hospital course c/b c.diff, and patient has been placed on vancomycin.    Plan:     NEUROLOGIC  # Baseline AOx3  Course:  - intubated 7/9  - weaned off propofol onto precedex  - extubated 7/15  Plan:  - fentanyl prn for pain control (last dose 7/12/24)    CARDIOVASCULAR  # hemorrhagic shock   Course:  - RRT called 7/9 for hypotension   - 2/2 to Upper GI bleeds, urgently took to EGD, found bleeding ulcer that was not well controlled, underwent IR empiric embolization (7/9)  - s/p 5 pRBC prior to MICU, and 4 pRBC, 2 FFP, 2 platelets in MICU  - Restarted plavix 7/13/24 for new stent (6/12/24) per GI  - on henny, maintain MAP > 65; took off vaso and precedex (7/13/24), he got tachy to 118. Added precedex back. vasopressin added 7/14  - per spouse, pt's baseline BP is a little bit over 100  - Albumin 5%  mL given 7/16  - Vasopressin stopped (7/17)  Findings:  - AM cortisol 17 (7/5), AM cortisol 37.1 (7/11)  - POCUS ECHO (7/16) showed no cardiogenic shock, no tamponade, low SV indeterminate IVC, irregular B lines but not concerning for pulmonary edema, and some subdiaphragmatic fluid  Plan:  - Transfuse as needed  - Continue henny, try to wean (goal MAP >65)   - Continue plavix 75 mg daily  - Continue midodrine 30mg q8  - Increase droxidopa to 300mg q8       #CAD s/p PCI x3, most recent stent 6/12/24, NURIA to pLAD (UNCHANGED)  Course:  - restarted plavix 7/13/24 for new stent (6/12/24) per GI   Plan:  - d/c statin because of elevated LFTs    #PAD  # A-fib on eliquis WORSENED  - s/p successful DCCV 10/31   - In RVR 7/16  - Cards recommended resuming home dose sotalol 40 mg PO (qTC wnl)  - s/p 1 dose sotalol but RVR persisted  - s/p 2 doses of amio overnight (7/16)  - attempted to restart home sotalol, but patient HR returned to 150s in afternoon (7/16)  - Amio restarted amio 150 mg followed by loading dose of 1 mg/min for 6 hours and 0.5 mg/min for 18 hours (7/16)  - received amio 150 overnight (7/17)  Plan:  - Hold eliquis   - Continue amio 0.5 mg/min for total 18 hours (to finish ~1530 today)  - After start amio 400 mg q8 for 12 doses followed by 200 mg daily  - Hold home sotalol    PULMONARY  #Intubated for airway protection prior to EGD/IR embolization (RESOLVED)  Course:  - Intubated 7/9  - Extubated 7/15  - On RA, satting well    #Sputum production  - Patient complaining of cough and phlegm  - Patient able to self-suction  Plan:  - Airway clearance protocol    RENAL/  #RAZIA   #ATN i.s.o hypotension  #metabolic acidosis   - Baseline Cr  Findings:  - pH 7.37 (7/16)  - Cr 1.53 (7/16)  - AG 18 (7/17)  Plan:  - ABG, if acidotic consider BiPAP  - CMP for concern for DKA  - Beta hydroxybutyrate for concern for DKA  - f/u nephro recs    - consider diuresis for volume overload pending nephro recs  - Continue sodium bicarb 1300 mg tid, added per nephro  - trend BUN/Cr   - monitor Is and Os     #Urinary retention  Plan:  - Started Doxazosin 2 mg daily (7/15)  - Monitor I/Os    GASTROINTESTINAL  #Upper GI Bleed (RESOLVED)  Course:  - s/p 5 units pRBC prior to MICU and then 4:2:2 on 7/12  - CT A/P 7/9 - Active bleeding in the third portion of the duodenum. Progression of liver metastases.  - EGD 7/9 showed esophagitis, One non-bleeding duodenal ulcer with a clean ulcer base (Arjun Class III). One oozing duodenal ulcer with spurting hemorrhage (Arjun Class Ia). Treatment not successful. Treated with bipolar cautery.  - s/p IR embolization on 7/9  - placed OG tube per GI and started ensure clear liquid (see note from RD 7/13/24)  - OG tube removed during extubation (7/15/24)  -dysphagia screen failed  Plan:  - per GI recs, changed PPI gtt to PPI IV BID  - monitor Hgb, transfuse as needed   - hold eliquis   - NG tube in place  - f/u FEES   - Diet per RD recs  - continue to f/u with GI and IR     - Per GI, "Will need PPI BID for 8 weeks for grade D esophagitis and PUD"    #Diarrhea (IMPROVED)  Course:  - stopped maintenance fluids   - positive for c. diff (7/14)  - started vancomycin (7/14 -)   - having worsening diarrhea, no melena (7/16)  Plan:  - Continue vancomycin  - Fluids as necessary to compensate for volume loss 2/2 diarrhea    #Transaminitis (WORSENED)  Findings:  - Bili 1.1 (7/16) -> 1.3 (7/17)  - Alk P 359 (7/16)-> 484 (7/17)  -  (7/16)->129 (7/17)  - ALT 67 (7/16)-> 72 (7/17)  Plan:  - d/c statin  - RUQ US    INFECTIOUS DISEASE  #leukocytosis (UNCHANGED)  Findings/Course:  -  BCx 7/10 - NGTD  - Sputum Cx from ETT 7/13 showed numerous gram neg krishna (Klebsiella oxytoca/raoultella ornithinolytica)  - Started zosyn for empiric treatment (7/11 - )   - GI panel: previously indeterminate norovirus (7/8), negative (7/13)     - per ID- no intervention needed regarding the PCR results  - c. diff + (7/14)  - started vancomycin for c. diff (7/14- )  - febrile 7/16 overnight  - currently afebrile 7/17  Plan:  - Continue vancomycin 125 mg q6 for c. diff  - Continue zosyn 3.375 g IV until 7/18 evening  - If persistent fevers, consider escalating vancomycin to 250mg q6 or adding flagyl  - If febrile again, send cultures  - Nasal MRSA  - Continue to monitor for fevers    ENDOCRINE  #adrenal insufficiency   Findings:  -7/5 cortisol 17   - 7/11 cortisol 37.1     HEMATOLOGY/ONCOLOGY   # neuroendocrine tumor   - was on lanreotide then had POD in liver and started on peptide receptor radiotherapy (PRRT)- typically given every 8 weeks for 4 doses. He last received it 10/20/2023   - PET 5/28/24 shows new somatostatin avid osseous lesions; decreased intensely avid right supradiaphragmatic and upper abdominal nodes, suspicious for mets  Plan:  - per heme/onc:    - can resume octreotide 150 mcg bid once infection r/o      - can check factor levels V, VIII, X     - "-- f/u with Dr. Sophia Prasad at [facility] [place] after discharge, no plan for treatment inpatient, if clinically improves/stabilizes then can be considered for treatment outpatient"    #DVT ppx  - SCDs  - LE duplex ordered today (7/17)    SKINS:   - Lines/Tubes - PIV, cordis replaced with central line (7/14)  Plan:  - Remove central line today, transition henny to PIV (7/17)    Ethics:   - Full Code   - GOC 7/12, spoke to spouse (Ymailet) over phone with palliative care team, discussed GOC again on 7/16    Consults this admission: GI, Heme Onc, Palliative, Endocrinology, Cardiology, Nephrology

## 2024-07-17 NOTE — PROGRESS NOTE ADULT - SUBJECTIVE AND OBJECTIVE BOX
Jodie Okeefe MD PGY-1  ---------------------------------------------------------------------------------------------  Patient is a 78y old  Male who presents with a chief complaint of hypotension (16 Jul 2024 20:45)      HPI:  77 yo male with PMH of CAD s/p PCI x3 with most recent stent 6/12/24 on Plavix, chronic Afib on eliquis, orthostatic hypotension on midodrine, PAD, 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 Patient states since discharge on this past Saturday, he continued to feel ongoing generalized weakness and dizziness with positional changes despite compliance with home midodrine 15mg TID and holding torsemide as instructed. Admits to poor PO intake of fluids/solute due to ongoing issues with poor appetite and nausea with meals which is not new. Denies any fever, chills, chest pain, SOB, cough, abd pain, dysuria nor urinary frequency. Has chronic diarrhea that is unchanged from his baseline.     In the ED, vitals notable for SBP 92-11s. Labs notable for elevated BUN/Cr 31/1.59 (from 30/1.18 on day of discharge 6/29/24). CXR with clear lungs. Abd US with innumerable intrahepatic echogenic masses consistent with known metastatic neuroendocrine tumor. Admitted to medicine for symptomatic hypotension and RAZIA. (02 Jul 2024 18:34)      SUBJECTIVE / OVERNIGHT EVENTS: ***      Other:  Gtt  O2  Lines  Ivory  No new cultures  No new imaging    MEDICATIONS  (STANDING):  aMIOdarone Infusion 0.5 mG/Min (16.7 mL/Hr) IV Continuous <Continuous>  aMIOdarone Infusion 1 mG/Min (33.3 mL/Hr) IV Continuous <Continuous>  atorvastatin 80 milliGRAM(s) Oral at bedtime  chlorhexidine 2% Cloths 1 Application(s) Topical <User Schedule>  clopidogrel Tablet 75 milliGRAM(s) Enteral Tube daily  doxazosin 2 milliGRAM(s) Oral at bedtime  droxidopa 200 milliGRAM(s) Oral every 8 hours  folic acid Injectable 1 milliGRAM(s) IV Push daily  glucagon  Injectable 1 milliGRAM(s) IntraMuscular once  insulin lispro (ADMELOG) corrective regimen sliding scale   SubCutaneous every 6 hours  magnesium sulfate  IVPB 1 Gram(s) IV Intermittent once  midodrine 30 milliGRAM(s) Oral every 8 hours  pantoprazole  Injectable 40 milliGRAM(s) IV Push every 12 hours  phenylephrine    Infusion 0.25 MICROgram(s)/kG/Min (9.25 mL/Hr) IV Continuous <Continuous>  piperacillin/tazobactam IVPB.. 3.375 Gram(s) IV Intermittent every 8 hours  potassium chloride   Powder 40 milliEquivalent(s) Oral once  sodium bicarbonate 650 milliGRAM(s) Oral three times a day  thiamine Injectable 100 milliGRAM(s) IV Push daily  vancomycin    Solution 125 milliGRAM(s) Oral every 6 hours  vasopressin Infusion 0.04 Unit(s)/Min (6 mL/Hr) IV Continuous <Continuous>    MEDICATIONS  (PRN):  acetaminophen     Tablet .. 650 milliGRAM(s) Oral every 6 hours PRN Temp greater or equal to 38C (100.4F)    Allergies    No Known Allergies    Intolerances    ICU Vital Signs Last 24 Hrs  T(F): 99.7 (17 Jul 2024 03:00), Max: 101.7 (16 Jul 2024 23:00)  HR: 115 (17 Jul 2024 03:30) (105 - 176)  BP: 128/68 (17 Jul 2024 03:30) (87/54 - 184/116)  BP(mean): 92 (17 Jul 2024 03:30) (64 - 140)  ABP: --  ABP(mean): --  RR: 30 (17 Jul 2024 03:30) (21 - 46)  SpO2: 100% (17 Jul 2024 03:30) (92% - 100%)    Physical Exam:   GENERAL: NAD, lying in bed comfortably  HEAD:  Atraumatic, Normocephalic  EYES: EOMI, PERRLA, conjunctiva and sclera clear  ENT: Moist mucous membranes  NECK: Supple, No JVD  CHEST/LUNG: Clear to auscultation bilaterally; No rales, rhonchi, wheezing, or rubs. Unlabored respirations  HEART: Regular rate and rhythm; Normal S1/S2. No murmurs, rubs, or gallops  ABDOMEN: Bowel sounds present; Soft, Nontender, Nondistended. No hepatomegaly  EXTREMITIES:  2+ Peripheral Pulses, brisk capillary refill. No clubbing, cyanosis, or edema  NERVOUS SYSTEM:  Alert & Oriented X3, speech clear. No deficits.  MSK: FROM all 4 extremities, full and equal strength  SKIN: No rashes or lesions    I&O's Summary    15 Jul 2024 07:01  -  16 Jul 2024 07:00  --------------------------------------------------------  IN: 1383.8 mL / OUT: 945 mL / NET: 438.8 mL    16 Jul 2024 07:01  -  17 Jul 2024 05:00  --------------------------------------------------------  IN: 4820 mL / OUT: 940 mL / NET: 3880 mL      LABS:                        10.3   11.64 )-----------( 86       ( 17 Jul 2024 00:41 )             32.1     07-17    143  |  111<H>  |  63<H>  ----------------------------<  301<H>  3.9   |  14<L>  |  1.51<H>    Ca    7.3<L>      17 Jul 2024 00:42  Phos  3.5     07-17  Mg     1.7     07-17    TPro  4.7<L>  /  Alb  2.3<L>  /  TBili  1.3<H>  /  DBili  x   /  AST  129<H>  /  ALT  72<H>  /  AlkPhos  484<H>  07-17    PT/INR - ( 17 Jul 2024 00:42 )   PT: 12.4 sec;   INR: 1.19 ratio         PTT - ( 17 Jul 2024 00:42 )  PTT:30.7 sec  ABG - ( 15 Jul 2024 11:47 )  pH, Arterial: 7.39  pH, Blood: x     /  pCO2: 27    /  pO2: 114   / HCO3: 16    / Base Excess: -7.1  /  SaO2: 99.3        Venous: 07-17-24 @ 00:38 FiO2: 21 Oxygen Sat% 62.7  Venous: 07-16-24 @ 12:10 FiO2: 21 Oxygen Sat% 67.7    Urinalysis Basic - ( 17 Jul 2024 00:42 )    Color: x / Appearance: x / SG: x / pH: x  Gluc: 301 mg/dL / Ketone: x  / Bili: x / Urobili: x   Blood: x / Protein: x / Nitrite: x   Leuk Esterase: x / RBC: x / WBC x   Sq Epi: x / Non Sq Epi: x / Bacteria: x      CAPILLARY BLOOD GLUCOSE      POCT Blood Glucose.: 317 mg/dL (17 Jul 2024 01:36)  POCT Blood Glucose.: 185 mg/dL (16 Jul 2024 17:07)  POCT Blood Glucose.: 154 mg/dL (16 Jul 2024 12:12)  POCT Blood Glucose.: 146 mg/dL (16 Jul 2024 06:28)   Jodie Okeefe MD PGY-1  ---------------------------------------------------------------------------------------------  Patient is a 78y old  Male who presents with a chief complaint of hypotension (16 Jul 2024 20:45)      HPI:  79 yo male with PMH of CAD s/p PCI x3 with most recent stent 6/12/24 on Plavix, chronic Afib on eliquis, orthostatic hypotension on midodrine, PAD, 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 Patient states since discharge on this past Saturday, he continued to feel ongoing generalized weakness and dizziness with positional changes despite compliance with home midodrine 15mg TID and holding torsemide as instructed. Admits to poor PO intake of fluids/solute due to ongoing issues with poor appetite and nausea with meals which is not new. Denies any fever, chills, chest pain, SOB, cough, abd pain, dysuria nor urinary frequency. Has chronic diarrhea that is unchanged from his baseline.     In the ED, vitals notable for SBP 92-11s. Labs notable for elevated BUN/Cr 31/1.59 (from 30/1.18 on day of discharge 6/29/24). CXR with clear lungs. Abd US with innumerable intrahepatic echogenic masses consistent with known metastatic neuroendocrine tumor. Admitted to medicine for symptomatic hypotension and RAZIA. (02 Jul 2024 18:34)      SUBJECTIVE / OVERNIGHT EVENTS:  Overnight, patient spiked fever around 10pm. Received PO tylenol 650 mg which was not effective, but then received Tylenol 500mg IV which was effective. Patient HR increased, and was talk of increasing amio, but able to get back to 110s without intervention. Otherwise, patient had two BM overnight.     Patient examined at bedside      Other Data at Bedside:  Vitals:  - Temp 37.3, , SpO2 100% RA, /75 (MAP 90), RR 32  Gtt: henny 0.5, amio 0.5, vaso***  O2: RA  Lines: Central line  UOP:   No new cultures  No new imaging    MEDICATIONS  (STANDING):  aMIOdarone Infusion 0.5 mG/Min (16.7 mL/Hr) IV Continuous <Continuous>  aMIOdarone Infusion 1 mG/Min (33.3 mL/Hr) IV Continuous <Continuous>  atorvastatin 80 milliGRAM(s) Oral at bedtime  chlorhexidine 2% Cloths 1 Application(s) Topical <User Schedule>  clopidogrel Tablet 75 milliGRAM(s) Enteral Tube daily  doxazosin 2 milliGRAM(s) Oral at bedtime  droxidopa 200 milliGRAM(s) Oral every 8 hours  folic acid Injectable 1 milliGRAM(s) IV Push daily  glucagon  Injectable 1 milliGRAM(s) IntraMuscular once  insulin lispro (ADMELOG) corrective regimen sliding scale   SubCutaneous every 6 hours  magnesium sulfate  IVPB 1 Gram(s) IV Intermittent once  midodrine 30 milliGRAM(s) Oral every 8 hours  pantoprazole  Injectable 40 milliGRAM(s) IV Push every 12 hours  phenylephrine    Infusion 0.25 MICROgram(s)/kG/Min (9.25 mL/Hr) IV Continuous <Continuous>  piperacillin/tazobactam IVPB.. 3.375 Gram(s) IV Intermittent every 8 hours  potassium chloride   Powder 40 milliEquivalent(s) Oral once  sodium bicarbonate 650 milliGRAM(s) Oral three times a day  thiamine Injectable 100 milliGRAM(s) IV Push daily  vancomycin    Solution 125 milliGRAM(s) Oral every 6 hours  vasopressin Infusion 0.04 Unit(s)/Min (6 mL/Hr) IV Continuous <Continuous>    MEDICATIONS  (PRN):  acetaminophen     Tablet .. 650 milliGRAM(s) Oral every 6 hours PRN Temp greater or equal to 38C (100.4F)    Allergies    No Known Allergies    Intolerances    ICU Vital Signs Last 24 Hrs  T(F): 99.7 (17 Jul 2024 03:00), Max: 101.7 (16 Jul 2024 23:00)  HR: 115 (17 Jul 2024 03:30) (105 - 176)  BP: 128/68 (17 Jul 2024 03:30) (87/54 - 184/116)  BP(mean): 92 (17 Jul 2024 03:30) (64 - 140)  ABP: --  ABP(mean): --  RR: 30 (17 Jul 2024 03:30) (21 - 46)  SpO2: 100% (17 Jul 2024 03:30) (92% - 100%)    Physical Exam:   GENERAL: NAD, lying in bed comfortably  HEAD:  Atraumatic, Normocephalic  EYES: EOMI, PERRLA, conjunctiva and sclera clear  ENT: Moist mucous membranes  NECK: Supple, No JVD  CHEST/LUNG: Clear to auscultation bilaterally; No rales, rhonchi, wheezing, or rubs. Unlabored respirations  HEART: Regular rate and rhythm; Normal S1/S2. No murmurs, rubs, or gallops  ABDOMEN: Bowel sounds present; Soft, Nontender, Nondistended. No hepatomegaly  EXTREMITIES:  2+ Peripheral Pulses, brisk capillary refill. No clubbing, cyanosis, or edema  NERVOUS SYSTEM:  Alert & Oriented X3, speech clear. No deficits.  MSK: FROM all 4 extremities, full and equal strength  SKIN: No rashes or lesions    I&O's Summary    15 Jul 2024 07:01  -  16 Jul 2024 07:00  --------------------------------------------------------  IN: 1383.8 mL / OUT: 945 mL / NET: 438.8 mL    16 Jul 2024 07:01  -  17 Jul 2024 05:00  --------------------------------------------------------  IN: 4820 mL / OUT: 940 mL / NET: 3880 mL      LABS:                        10.3   11.64 )-----------( 86       ( 17 Jul 2024 00:41 )             32.1     07-17    143  |  111<H>  |  63<H>  ----------------------------<  301<H>  3.9   |  14<L>  |  1.51<H>    Ca    7.3<L>      17 Jul 2024 00:42  Phos  3.5     07-17  Mg     1.7     07-17    TPro  4.7<L>  /  Alb  2.3<L>  /  TBili  1.3<H>  /  DBili  x   /  AST  129<H>  /  ALT  72<H>  /  AlkPhos  484<H>  07-17    PT/INR - ( 17 Jul 2024 00:42 )   PT: 12.4 sec;   INR: 1.19 ratio         PTT - ( 17 Jul 2024 00:42 )  PTT:30.7 sec  ABG - ( 15 Jul 2024 11:47 )  pH, Arterial: 7.39  pH, Blood: x     /  pCO2: 27    /  pO2: 114   / HCO3: 16    / Base Excess: -7.1  /  SaO2: 99.3        Venous: 07-17-24 @ 00:38 FiO2: 21 Oxygen Sat% 62.7  Venous: 07-16-24 @ 12:10 FiO2: 21 Oxygen Sat% 67.7    Urinalysis Basic - ( 17 Jul 2024 00:42 )    Color: x / Appearance: x / SG: x / pH: x  Gluc: 301 mg/dL / Ketone: x  / Bili: x / Urobili: x   Blood: x / Protein: x / Nitrite: x   Leuk Esterase: x / RBC: x / WBC x   Sq Epi: x / Non Sq Epi: x / Bacteria: x      CAPILLARY BLOOD GLUCOSE      POCT Blood Glucose.: 317 mg/dL (17 Jul 2024 01:36)  POCT Blood Glucose.: 185 mg/dL (16 Jul 2024 17:07)  POCT Blood Glucose.: 154 mg/dL (16 Jul 2024 12:12)  POCT Blood Glucose.: 146 mg/dL (16 Jul 2024 06:28)   Jodie Okeefe MD PGY-1  ---------------------------------------------------------------------------------------------  Patient is a 78y old  Male who presents with a chief complaint of hypotension (16 Jul 2024 20:45)      HPI:  79 yo male with PMH of CAD s/p PCI x3 with most recent stent 6/12/24 on Plavix, chronic Afib on eliquis, orthostatic hypotension on midodrine, PAD, 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 Patient states since discharge on this past Saturday, he continued to feel ongoing generalized weakness and dizziness with positional changes despite compliance with home midodrine 15mg TID and holding torsemide as instructed. Admits to poor PO intake of fluids/solute due to ongoing issues with poor appetite and nausea with meals which is not new. Denies any fever, chills, chest pain, SOB, cough, abd pain, dysuria nor urinary frequency. Has chronic diarrhea that is unchanged from his baseline.     In the ED, vitals notable for SBP 92-11s. Labs notable for elevated BUN/Cr 31/1.59 (from 30/1.18 on day of discharge 6/29/24). CXR with clear lungs. Abd US with innumerable intrahepatic echogenic masses consistent with known metastatic neuroendocrine tumor. Admitted to medicine for symptomatic hypotension and RAZIA. (02 Jul 2024 18:34)      SUBJECTIVE / OVERNIGHT EVENTS:  Overnight, patient spiked fever around 10pm to 38.5 C. Received PO tylenol 650 mg which was not effective, but then received Tylenol 500mg IV which was effective. Patient HR increased to 160s and patient was given 150 mg of amio. He also had 15 beats NSVT that resolved. Otherwise, patient had two BM overnight.     Patient examined at bedside. He denies pain or SOB. He states that the phlegm is about the same.       Other Data at Bedside:  Vitals:  - Temp 37.3, , SpO2 100% RA, /75 (MAP 90), RR 32  Gtt: henny 0.5, amio 0.5, vaso 0.04  O2: RA  Lines: Central line  UOP: ~25 mL in tavarez bag  No new cultures  No new imaging    MEDICATIONS  (STANDING):  aMIOdarone Infusion 0.5 mG/Min (16.7 mL/Hr) IV Continuous <Continuous>  aMIOdarone Infusion 1 mG/Min (33.3 mL/Hr) IV Continuous <Continuous>  atorvastatin 80 milliGRAM(s) Oral at bedtime  chlorhexidine 2% Cloths 1 Application(s) Topical <User Schedule>  clopidogrel Tablet 75 milliGRAM(s) Enteral Tube daily  doxazosin 2 milliGRAM(s) Oral at bedtime  droxidopa 200 milliGRAM(s) Oral every 8 hours  folic acid Injectable 1 milliGRAM(s) IV Push daily  glucagon  Injectable 1 milliGRAM(s) IntraMuscular once  insulin lispro (ADMELOG) corrective regimen sliding scale   SubCutaneous every 6 hours  magnesium sulfate  IVPB 1 Gram(s) IV Intermittent once  midodrine 30 milliGRAM(s) Oral every 8 hours  pantoprazole  Injectable 40 milliGRAM(s) IV Push every 12 hours  phenylephrine    Infusion 0.25 MICROgram(s)/kG/Min (9.25 mL/Hr) IV Continuous <Continuous>  piperacillin/tazobactam IVPB.. 3.375 Gram(s) IV Intermittent every 8 hours  potassium chloride   Powder 40 milliEquivalent(s) Oral once  sodium bicarbonate 650 milliGRAM(s) Oral three times a day  thiamine Injectable 100 milliGRAM(s) IV Push daily  vancomycin    Solution 125 milliGRAM(s) Oral every 6 hours  vasopressin Infusion 0.04 Unit(s)/Min (6 mL/Hr) IV Continuous <Continuous>    MEDICATIONS  (PRN):  acetaminophen     Tablet .. 650 milliGRAM(s) Oral every 6 hours PRN Temp greater or equal to 38C (100.4F)    Allergies    No Known Allergies    Intolerances    ICU Vital Signs Last 24 Hrs  T(F): 99.7 (17 Jul 2024 03:00), Max: 101.7 (16 Jul 2024 23:00)  HR: 115 (17 Jul 2024 03:30) (105 - 176)  BP: 128/68 (17 Jul 2024 03:30) (87/54 - 184/116)  BP(mean): 92 (17 Jul 2024 03:30) (64 - 140)  ABP: --  ABP(mean): --  RR: 30 (17 Jul 2024 03:30) (21 - 46)  SpO2: 100% (17 Jul 2024 03:30) (92% - 100%)    Physical Exam:   GENERAL: NAD, lying in bed comfortably, headrest elevated, NG tube in place, no nasal cannula  HEAD:  Atraumatic, Normocephalic  EYES: EOMI, PERRLA, conjunctiva and sclera clear  NECK:  No JVD  CHEST/LUNG: Coarse breath sounds bilaterally, no decreased breath sounds  HEART: Tachycardic, irregular rate & rhythm, no murmurs  ABDOMEN: Bowel sounds present; Soft, Nontender  EXTREMITIES: Bilateral LE edema, pitting left side  NERVOUS SYSTEM:  Alert & Oriented X3, speech clear. No apparent deficits  SKIN: No rashes or lesions    I&O's Summary    15 Jul 2024 07:01  -  16 Jul 2024 07:00  --------------------------------------------------------  IN: 1383.8 mL / OUT: 945 mL / NET: 438.8 mL    16 Jul 2024 07:01  -  17 Jul 2024 05:00  --------------------------------------------------------  IN: 4820 mL / OUT: 940 mL / NET: 3880 mL      LABS:                        10.3   11.64 )-----------( 86       ( 17 Jul 2024 00:41 )             32.1     07-17    143  |  111<H>  |  63<H>  ----------------------------<  301<H>  3.9   |  14<L>  |  1.51<H>    Ca    7.3<L>      17 Jul 2024 00:42  Phos  3.5     07-17  Mg     1.7     07-17    TPro  4.7<L>  /  Alb  2.3<L>  /  TBili  1.3<H>  /  DBili  x   /  AST  129<H>  /  ALT  72<H>  /  AlkPhos  484<H>  07-17    PT/INR - ( 17 Jul 2024 00:42 )   PT: 12.4 sec;   INR: 1.19 ratio         PTT - ( 17 Jul 2024 00:42 )  PTT:30.7 sec  ABG - ( 15 Jul 2024 11:47 )  pH, Arterial: 7.39  pH, Blood: x     /  pCO2: 27    /  pO2: 114   / HCO3: 16    / Base Excess: -7.1  /  SaO2: 99.3        Venous: 07-17-24 @ 00:38 FiO2: 21 Oxygen Sat% 62.7  Venous: 07-16-24 @ 12:10 FiO2: 21 Oxygen Sat% 67.7    POCT Blood Glucose.: 317 mg/dL (17 Jul 2024 01:36)  POCT Blood Glucose.: 185 mg/dL (16 Jul 2024 17:07)  POCT Blood Glucose.: 154 mg/dL (16 Jul 2024 12:12)  POCT Blood Glucose.: 146 mg/dL (16 Jul 2024 06:28)   Jodie Okeefe MD PGY-1  ---------------------------------------------------------------------------------------------  Patient is a 78y old  Male who presents with a chief complaint of hypotension (16 Jul 2024 20:45)      HPI:  79 yo male with PMH of CAD s/p PCI x3 with most recent stent 6/12/24 on Plavix, chronic Afib on eliquis, orthostatic hypotension on midodrine, PAD, 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 Patient states since discharge on this past Saturday, he continued to feel ongoing generalized weakness and dizziness with positional changes despite compliance with home midodrine 15mg TID and holding torsemide as instructed. Admits to poor PO intake of fluids/solute due to ongoing issues with poor appetite and nausea with meals which is not new. Denies any fever, chills, chest pain, SOB, cough, abd pain, dysuria nor urinary frequency. Has chronic diarrhea that is unchanged from his baseline.     In the ED, vitals notable for SBP 92-11s. Labs notable for elevated BUN/Cr 31/1.59 (from 30/1.18 on day of discharge 6/29/24). CXR with clear lungs. Abd US with innumerable intrahepatic echogenic masses consistent with known metastatic neuroendocrine tumor. Admitted to medicine for symptomatic hypotension and RAZIA. (02 Jul 2024 18:34)      SUBJECTIVE / OVERNIGHT EVENTS:  Overnight, patient spiked fever around 10pm to 38.5 C. Received PO tylenol 650 mg which was not effective, but then received Tylenol 500mg IV which was effective. Patient HR increased to 160s and patient was given 150 mg of amio. He also had 15 beats NSVT that resolved. Ady able to be decreased from 1.5 to 0.5. Otherwise, patient had two BM overnight.     Patient examined at bedside. He denies pain or SOB. He states that the phlegm is about the same.       Other Data at Bedside:  Vitals:  - Temp 37.3, , SpO2 100% RA, /75 (MAP 90), RR 32  Gtt: ady 0.5, amio 0.5, vaso 0.04  O2: RA  Lines: Central line  UOP: ~25 mL in tavarez bag  No new cultures  No new imaging    MEDICATIONS  (STANDING):  aMIOdarone Infusion 0.5 mG/Min (16.7 mL/Hr) IV Continuous <Continuous>  aMIOdarone Infusion 1 mG/Min (33.3 mL/Hr) IV Continuous <Continuous>  atorvastatin 80 milliGRAM(s) Oral at bedtime  chlorhexidine 2% Cloths 1 Application(s) Topical <User Schedule>  clopidogrel Tablet 75 milliGRAM(s) Enteral Tube daily  doxazosin 2 milliGRAM(s) Oral at bedtime  droxidopa 200 milliGRAM(s) Oral every 8 hours  folic acid Injectable 1 milliGRAM(s) IV Push daily  glucagon  Injectable 1 milliGRAM(s) IntraMuscular once  insulin lispro (ADMELOG) corrective regimen sliding scale   SubCutaneous every 6 hours  magnesium sulfate  IVPB 1 Gram(s) IV Intermittent once  midodrine 30 milliGRAM(s) Oral every 8 hours  pantoprazole  Injectable 40 milliGRAM(s) IV Push every 12 hours  phenylephrine    Infusion 0.25 MICROgram(s)/kG/Min (9.25 mL/Hr) IV Continuous <Continuous>  piperacillin/tazobactam IVPB.. 3.375 Gram(s) IV Intermittent every 8 hours  potassium chloride   Powder 40 milliEquivalent(s) Oral once  sodium bicarbonate 650 milliGRAM(s) Oral three times a day  thiamine Injectable 100 milliGRAM(s) IV Push daily  vancomycin    Solution 125 milliGRAM(s) Oral every 6 hours  vasopressin Infusion 0.04 Unit(s)/Min (6 mL/Hr) IV Continuous <Continuous>    MEDICATIONS  (PRN):  acetaminophen     Tablet .. 650 milliGRAM(s) Oral every 6 hours PRN Temp greater or equal to 38C (100.4F)    Allergies    No Known Allergies    Intolerances    ICU Vital Signs Last 24 Hrs  T(F): 99.7 (17 Jul 2024 03:00), Max: 101.7 (16 Jul 2024 23:00)  HR: 115 (17 Jul 2024 03:30) (105 - 176)  BP: 128/68 (17 Jul 2024 03:30) (87/54 - 184/116)  BP(mean): 92 (17 Jul 2024 03:30) (64 - 140)  ABP: --  ABP(mean): --  RR: 30 (17 Jul 2024 03:30) (21 - 46)  SpO2: 100% (17 Jul 2024 03:30) (92% - 100%)    Physical Exam:   GENERAL: NAD, lying in bed comfortably, headrest elevated, NG tube in place, no nasal cannula  HEAD:  Atraumatic, Normocephalic  EYES: EOMI, PERRLA, conjunctiva and sclera clear  NECK:  No JVD  CHEST/LUNG: Coarse breath sounds bilaterally, no decreased breath sounds  HEART: Tachycardic, irregular rate & rhythm, no murmurs  ABDOMEN: Bowel sounds present; Soft, Nontender  EXTREMITIES: Bilateral LE edema, pitting left side  NERVOUS SYSTEM:  Alert & Oriented X3, speech clear. No apparent deficits  SKIN: No rashes or lesions    I&O's Summary    15 Jul 2024 07:01  -  16 Jul 2024 07:00  --------------------------------------------------------  IN: 1383.8 mL / OUT: 945 mL / NET: 438.8 mL    16 Jul 2024 07:01  -  17 Jul 2024 05:00  --------------------------------------------------------  IN: 4820 mL / OUT: 940 mL / NET: 3880 mL      LABS:                        10.3   11.64 )-----------( 86       ( 17 Jul 2024 00:41 )             32.1     07-17    143  |  111<H>  |  63<H>  ----------------------------<  301<H>  3.9   |  14<L>  |  1.51<H>    Ca    7.3<L>      17 Jul 2024 00:42  Phos  3.5     07-17  Mg     1.7     07-17    TPro  4.7<L>  /  Alb  2.3<L>  /  TBili  1.3<H>  /  DBili  x   /  AST  129<H>  /  ALT  72<H>  /  AlkPhos  484<H>  07-17    PT/INR - ( 17 Jul 2024 00:42 )   PT: 12.4 sec;   INR: 1.19 ratio         PTT - ( 17 Jul 2024 00:42 )  PTT:30.7 sec  ABG - ( 15 Jul 2024 11:47 )  pH, Arterial: 7.39  pH, Blood: x     /  pCO2: 27    /  pO2: 114   / HCO3: 16    / Base Excess: -7.1  /  SaO2: 99.3        Venous: 07-17-24 @ 00:38 FiO2: 21 Oxygen Sat% 62.7  Venous: 07-16-24 @ 12:10 FiO2: 21 Oxygen Sat% 67.7    POCT Blood Glucose.: 317 mg/dL (17 Jul 2024 01:36)  POCT Blood Glucose.: 185 mg/dL (16 Jul 2024 17:07)  POCT Blood Glucose.: 154 mg/dL (16 Jul 2024 12:12)  POCT Blood Glucose.: 146 mg/dL (16 Jul 2024 06:28)   Jodie Okeefe MD PGY-1  ---------------------------------------------------------------------------------------------  Patient is a 78y old  Male who presents with a chief complaint of hypotension (16 Jul 2024 20:45)      HPI:  79 yo male with PMH of CAD s/p PCI x3 with most recent stent 6/12/24 on Plavix, chronic Afib on eliquis, orthostatic hypotension on midodrine, PAD, 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 Patient states since discharge on this past Saturday, he continued to feel ongoing generalized weakness and dizziness with positional changes despite compliance with home midodrine 15mg TID and holding torsemide as instructed. Admits to poor PO intake of fluids/solute due to ongoing issues with poor appetite and nausea with meals which is not new. Denies any fever, chills, chest pain, SOB, cough, abd pain, dysuria nor urinary frequency. Has chronic diarrhea that is unchanged from his baseline.     In the ED, vitals notable for SBP 92-11s. Labs notable for elevated BUN/Cr 31/1.59 (from 30/1.18 on day of discharge 6/29/24). CXR with clear lungs. Abd US with innumerable intrahepatic echogenic masses consistent with known metastatic neuroendocrine tumor. Admitted to medicine for symptomatic hypotension and RAZIA. (02 Jul 2024 18:34)      SUBJECTIVE / OVERNIGHT EVENTS:  Overnight, patient spiked fever around 10pm to 38.5 C. Received PO tylenol 650 mg which was not effective, but then received Tylenol 500mg IV which was effective. Patient HR increased to 160s and patient was given 150 mg of amio at 9pm. He also had 15 beats NSVT that resolved. Ady able to be decreased from 1.5 to 0.5. Otherwise, patient had two BM overnight.     Patient examined at bedside. He denies pain or SOB. He states that the phlegm is about the same.       Other Data at Bedside:  Vitals:  - Temp 37.3, , SpO2 100% RA, /75 (MAP 90), RR 32  Gtt: ady 0.5, amio 0.5, vaso 0.04  O2: RA  Lines: Central line  UOP: ~25 mL in tavarez bag  No new cultures  No new imaging    MEDICATIONS  (STANDING):  aMIOdarone Infusion 0.5 mG/Min (16.7 mL/Hr) IV Continuous <Continuous>  aMIOdarone Infusion 1 mG/Min (33.3 mL/Hr) IV Continuous <Continuous>  atorvastatin 80 milliGRAM(s) Oral at bedtime  chlorhexidine 2% Cloths 1 Application(s) Topical <User Schedule>  clopidogrel Tablet 75 milliGRAM(s) Enteral Tube daily  doxazosin 2 milliGRAM(s) Oral at bedtime  droxidopa 200 milliGRAM(s) Oral every 8 hours  folic acid Injectable 1 milliGRAM(s) IV Push daily  glucagon  Injectable 1 milliGRAM(s) IntraMuscular once  insulin lispro (ADMELOG) corrective regimen sliding scale   SubCutaneous every 6 hours  magnesium sulfate  IVPB 1 Gram(s) IV Intermittent once  midodrine 30 milliGRAM(s) Oral every 8 hours  pantoprazole  Injectable 40 milliGRAM(s) IV Push every 12 hours  phenylephrine    Infusion 0.25 MICROgram(s)/kG/Min (9.25 mL/Hr) IV Continuous <Continuous>  piperacillin/tazobactam IVPB.. 3.375 Gram(s) IV Intermittent every 8 hours  potassium chloride   Powder 40 milliEquivalent(s) Oral once  sodium bicarbonate 650 milliGRAM(s) Oral three times a day  thiamine Injectable 100 milliGRAM(s) IV Push daily  vancomycin    Solution 125 milliGRAM(s) Oral every 6 hours  vasopressin Infusion 0.04 Unit(s)/Min (6 mL/Hr) IV Continuous <Continuous>    MEDICATIONS  (PRN):  acetaminophen     Tablet .. 650 milliGRAM(s) Oral every 6 hours PRN Temp greater or equal to 38C (100.4F)    Allergies    No Known Allergies    Intolerances    ICU Vital Signs Last 24 Hrs  T(F): 99.7 (17 Jul 2024 03:00), Max: 101.7 (16 Jul 2024 23:00)  HR: 115 (17 Jul 2024 03:30) (105 - 176)  BP: 128/68 (17 Jul 2024 03:30) (87/54 - 184/116)  BP(mean): 92 (17 Jul 2024 03:30) (64 - 140)  ABP: --  ABP(mean): --  RR: 30 (17 Jul 2024 03:30) (21 - 46)  SpO2: 100% (17 Jul 2024 03:30) (92% - 100%)    Physical Exam:   GENERAL: NAD, lying in bed comfortably, headrest elevated, NG tube in place, no nasal cannula  HEAD:  Atraumatic, Normocephalic  EYES: EOMI, PERRLA, conjunctiva and sclera clear  NECK:  No JVD  CHEST/LUNG: Coarse breath sounds bilaterally, no decreased breath sounds  HEART: Tachycardic, irregular rate & rhythm, no murmurs  ABDOMEN: Bowel sounds present; Soft, Nontender  EXTREMITIES: Bilateral LE edema, pitting left side  NERVOUS SYSTEM:  Alert & Oriented X3, speech clear. No apparent deficits  SKIN: No rashes or lesions    I&O's Summary    15 Jul 2024 07:01  -  16 Jul 2024 07:00  --------------------------------------------------------  IN: 1383.8 mL / OUT: 945 mL / NET: 438.8 mL    16 Jul 2024 07:01  -  17 Jul 2024 05:00  --------------------------------------------------------  IN: 4820 mL / OUT: 940 mL / NET: 3880 mL      LABS:                        10.3   11.64 )-----------( 86       ( 17 Jul 2024 00:41 )             32.1     07-17    143  |  111<H>  |  63<H>  ----------------------------<  301<H>  3.9   |  14<L>  |  1.51<H>    Ca    7.3<L>      17 Jul 2024 00:42  Phos  3.5     07-17  Mg     1.7     07-17    TPro  4.7<L>  /  Alb  2.3<L>  /  TBili  1.3<H>  /  DBili  x   /  AST  129<H>  /  ALT  72<H>  /  AlkPhos  484<H>  07-17    PT/INR - ( 17 Jul 2024 00:42 )   PT: 12.4 sec;   INR: 1.19 ratio         PTT - ( 17 Jul 2024 00:42 )  PTT:30.7 sec  ABG - ( 15 Jul 2024 11:47 )  pH, Arterial: 7.39  pH, Blood: x     /  pCO2: 27    /  pO2: 114   / HCO3: 16    / Base Excess: -7.1  /  SaO2: 99.3        Venous: 07-17-24 @ 00:38 FiO2: 21 Oxygen Sat% 62.7  Venous: 07-16-24 @ 12:10 FiO2: 21 Oxygen Sat% 67.7    POCT Blood Glucose.: 317 mg/dL (17 Jul 2024 01:36)  POCT Blood Glucose.: 185 mg/dL (16 Jul 2024 17:07)  POCT Blood Glucose.: 154 mg/dL (16 Jul 2024 12:12)  POCT Blood Glucose.: 146 mg/dL (16 Jul 2024 06:28)   Jodie kOeefe MD PGY-1  ---------------------------------------------------------------------------------------------  Patient is a 78y old  Male who presents with a chief complaint of hypotension (16 Jul 2024 20:45)      HPI:  77 yo male with PMH of CAD s/p PCI x3 with most recent stent 6/12/24 on Plavix, chronic Afib on eliquis, orthostatic hypotension on midodrine, PAD, 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 Patient states since discharge on this past Saturday, he continued to feel ongoing generalized weakness and dizziness with positional changes despite compliance with home midodrine 15mg TID and holding torsemide as instructed. Admits to poor PO intake of fluids/solute due to ongoing issues with poor appetite and nausea with meals which is not new. Denies any fever, chills, chest pain, SOB, cough, abd pain, dysuria nor urinary frequency. Has chronic diarrhea that is unchanged from his baseline.     In the ED, vitals notable for SBP 92-11s. Labs notable for elevated BUN/Cr 31/1.59 (from 30/1.18 on day of discharge 6/29/24). CXR with clear lungs. Abd US with innumerable intrahepatic echogenic masses consistent with known metastatic neuroendocrine tumor. Admitted to medicine for symptomatic hypotension and RAZIA. (02 Jul 2024 18:34)      SUBJECTIVE / OVERNIGHT EVENTS:  Overnight, patient spiked fever around 10pm to 38.5 C. Received PO tylenol 650 mg which was not effective, but then received Tylenol 500mg IV which was effective. Patient HR increased to 160s and patient was given 150 mg of amio at 9pm. He also had 15 beats NSVT that resolved. Ady able to be decreased from 1.5 to 0.5. Otherwise, patient had two BM overnight.     Patient examined at bedside. He denies pain or SOB. He states that the phlegm is about the same.      Other Data at Bedside:  Vitals:  - Temp 37.3, , SpO2 100% RA, /75 (MAP 90), RR 32  Gtt: ady 0.5, amio 0.5, vaso d/c  O2: RA  Lines: Central line  UOP: ~25 mL in tavarez bag  No new cultures  No new imaging    MEDICATIONS  (STANDING):  aMIOdarone Infusion 0.5 mG/Min (16.7 mL/Hr) IV Continuous <Continuous>  aMIOdarone Infusion 1 mG/Min (33.3 mL/Hr) IV Continuous <Continuous>  atorvastatin 80 milliGRAM(s) Oral at bedtime  chlorhexidine 2% Cloths 1 Application(s) Topical <User Schedule>  clopidogrel Tablet 75 milliGRAM(s) Enteral Tube daily  doxazosin 2 milliGRAM(s) Oral at bedtime  droxidopa 200 milliGRAM(s) Oral every 8 hours  folic acid Injectable 1 milliGRAM(s) IV Push daily  glucagon  Injectable 1 milliGRAM(s) IntraMuscular once  insulin lispro (ADMELOG) corrective regimen sliding scale   SubCutaneous every 6 hours  magnesium sulfate  IVPB 1 Gram(s) IV Intermittent once  midodrine 30 milliGRAM(s) Oral every 8 hours  pantoprazole  Injectable 40 milliGRAM(s) IV Push every 12 hours  phenylephrine    Infusion 0.25 MICROgram(s)/kG/Min (9.25 mL/Hr) IV Continuous <Continuous>  piperacillin/tazobactam IVPB.. 3.375 Gram(s) IV Intermittent every 8 hours  potassium chloride   Powder 40 milliEquivalent(s) Oral once  sodium bicarbonate 650 milliGRAM(s) Oral three times a day  thiamine Injectable 100 milliGRAM(s) IV Push daily  vancomycin    Solution 125 milliGRAM(s) Oral every 6 hours  vasopressin Infusion 0.04 Unit(s)/Min (6 mL/Hr) IV Continuous <Continuous>    MEDICATIONS  (PRN):  acetaminophen     Tablet .. 650 milliGRAM(s) Oral every 6 hours PRN Temp greater or equal to 38C (100.4F)    Allergies    No Known Allergies    Intolerances    ICU Vital Signs Last 24 Hrs  T(F): 99.7 (17 Jul 2024 03:00), Max: 101.7 (16 Jul 2024 23:00)  HR: 115 (17 Jul 2024 03:30) (105 - 176)  BP: 128/68 (17 Jul 2024 03:30) (87/54 - 184/116)  BP(mean): 92 (17 Jul 2024 03:30) (64 - 140)  ABP: --  ABP(mean): --  RR: 30 (17 Jul 2024 03:30) (21 - 46)  SpO2: 100% (17 Jul 2024 03:30) (92% - 100%)    Physical Exam:   GENERAL: NAD, lying in bed comfortably, headrest elevated, NG tube in place, no nasal cannula  HEAD:  Atraumatic, Normocephalic  EYES: EOMI, PERRLA, conjunctiva and sclera clear  NECK:  No JVD  CHEST/LUNG: Coarse breath sounds bilaterally, no decreased breath sounds  HEART: Tachycardic, irregular rate & rhythm, no murmurs  ABDOMEN: Bowel sounds present; Soft, Nontender  EXTREMITIES: Bilateral LE edema, pitting left side  NERVOUS SYSTEM:  Alert & Oriented X3, speech clear. No apparent deficits  SKIN: No rashes or lesions    I&O's Summary    15 Jul 2024 07:01  -  16 Jul 2024 07:00  --------------------------------------------------------  IN: 1383.8 mL / OUT: 945 mL / NET: 438.8 mL    16 Jul 2024 07:01  -  17 Jul 2024 05:00  --------------------------------------------------------  IN: 4820 mL / OUT: 940 mL / NET: 3880 mL      LABS:                        10.3   11.64 )-----------( 86       ( 17 Jul 2024 00:41 )             32.1     07-17    143  |  111<H>  |  63<H>  ----------------------------<  301<H>  3.9   |  14<L>  |  1.51<H>    Ca    7.3<L>      17 Jul 2024 00:42  Phos  3.5     07-17  Mg     1.7     07-17    TPro  4.7<L>  /  Alb  2.3<L>  /  TBili  1.3<H>  /  DBili  x   /  AST  129<H>  /  ALT  72<H>  /  AlkPhos  484<H>  07-17    PT/INR - ( 17 Jul 2024 00:42 )   PT: 12.4 sec;   INR: 1.19 ratio         PTT - ( 17 Jul 2024 00:42 )  PTT:30.7 sec  ABG - ( 15 Jul 2024 11:47 )  pH, Arterial: 7.39  pH, Blood: x     /  pCO2: 27    /  pO2: 114   / HCO3: 16    / Base Excess: -7.1  /  SaO2: 99.3        Venous: 07-17-24 @ 00:38 FiO2: 21 Oxygen Sat% 62.7  Venous: 07-16-24 @ 12:10 FiO2: 21 Oxygen Sat% 67.7    POCT Blood Glucose.: 317 mg/dL (17 Jul 2024 01:36)  POCT Blood Glucose.: 185 mg/dL (16 Jul 2024 17:07)  POCT Blood Glucose.: 154 mg/dL (16 Jul 2024 12:12)  POCT Blood Glucose.: 146 mg/dL (16 Jul 2024 06:28)

## 2024-07-17 NOTE — SWALLOW FEES ASSESSMENT ADULT - COMMENTS
7/15 GI F/U NOTE; No further GI endoscopic evaluation at this time, c/w iv ppi bid x 8 weeks, check hp serology - if positive would treat. Diet as tolerated. GI to s/o.    7/14 CXR IMPRESSION: Small right pleural effusion. Interval insertion of left IJ central venous catheter with tip in the SVC. Remainder of lines and tubes above.    SWALLOW HISTORY: No reports in SCM or in PACS prior to this admission. + Vocal cords b/l mobile  + small glottic gap  + b/l mild erythema of vocal cords  + ? small b/l granulomas  + ?3 small round smooth symmetrical protrusions within valleculae space; ENT contacted to assess

## 2024-07-17 NOTE — PROGRESS NOTE ADULT - CRITICAL CARE SERVICES
CHIEF COMPLAINT:   Patient presents with:  Urinary Frequency: fever and frequent urination x2d. HPI:   Parish Haywood is a 50year old female who presents with symptoms of UTI.      Complaining of urinary frequency, urgency, for last 2 days - n PROCTOZONE-HC 2.5 % Rectal Cream APPLY AS DIRECTED Disp: 30 g Rfl: 0   BuPROPion HCl ER, XL, 300 MG Oral Tablet 24 Hr Take 300 mg by mouth daily. Disp:  Rfl:    Clindamycin Phos-Benzoyl Perox 1-5 % External Gel Apply topically daily.  Disp:  Rfl:    ampheta NEURO: no headaches. EXAM:   /85   Pulse 99   Temp 98.4 °F (36.9 °C) (Oral)   Resp 16   Ht 68\"   Wt 169 lb 3.2 oz (76.7 kg)   LMP 09/03/2019   SpO2 99%   Breastfeeding?  No   BMI 25.73 kg/m²   GENERAL: well developed, well nourished,in no apparent Sig: Take 1 capsule (100 mg total) by mouth 2 (two) times daily for 5 days. Reviewed test results with patient, discussed possible causes of frequent urination.  Pt crying while talking to this provider indicating if her UTI symptoms worsen, she will Please hold prescription, if you urine culture is positive you may take the antibiotic as prescribed. We will call you with results. · Complete full course of antibiotics.   · Over the counter Tylenol (acetaminophen) or Advil/Motrin (ibuprofen) can be · The ureters carry urine from the kidneys to the bladder. · The bladder holds urine until you are ready to let it out. · The urethra carries urine from the bladder out of the body.  It is shorter in women, so bacteria can move through it more easily. The Side effects that usually do not require medical attention (report to your doctor or health care professional if they continue or are bothersome):  · dark urine  · diarrhea  · headache  · loss of appetite  · nausea or vomiting  · temporary hair loss  What The patient indicates understanding of these issues and agrees to the plan. The patient is asked to return in 3 days if not better. Call if fever, vomiting, worsening symptoms. 45

## 2024-07-17 NOTE — OCCUPATIONAL THERAPY INITIAL EVALUATION ADULT - COGNITIVE, VISUAL PERCEPTUAL, OT EVAL
Pt seen for 2nd EAP visit with mother.  Notes on file.  
Pt will follow 100% directions within 4 weeks. Pt will participate in 30 min OT session without cues for alertness/attention within 4 weeks.

## 2024-07-17 NOTE — CONSULT NOTE ADULT - SUBJECTIVE AND OBJECTIVE BOX
CC: Vallecular lesion noted on FEES by SLP    HPI: 79 y/o Male with PMHx of CAD s/p PCI x3, with most recent stent 6/12/24 on Plavix, chronic Afib on eliquis, orthostatic hypotension on midodrine, PAD, neuroendocrine tumor with RT currently on hold, recent admission for dx cath on 6/24/24 who initially presents from PCP's office for hypotension despite taking AM midodrine dose. ENT consulted by SLP for vallecular lesion noted on FEES. Of note, pt was intubated from 7/9 to 7/15. Denies odynophagia, oral pain, or inability to tolerate oral secretions.         PAST MEDICAL & SURGICAL HISTORY:  Hypertension      Hypercholesterolemia      PAD (peripheral artery disease)      Neuroendocrine carcinoma      Hepatic carcinoma      Atrial fibrillation and flutter      CAD (coronary artery disease)      S/P knee replacement, bilateral      S/P hip replacement  right hip      History of hernia repair      H/O laminectomy      History of lumbosacral spine surgery      History of cardioversion        Allergies    No Known Allergies    Intolerances      MEDICATIONS  (STANDING):  aMIOdarone    Tablet 400 milliGRAM(s) Oral every 8 hours  aMIOdarone    Tablet   Oral   aMIOdarone Infusion 1 mG/Min (33.3 mL/Hr) IV Continuous <Continuous>  aMIOdarone Infusion 0.5 mG/Min (16.7 mL/Hr) IV Continuous <Continuous>  chlorhexidine 2% Cloths 1 Application(s) Topical <User Schedule>  clopidogrel Tablet 75 milliGRAM(s) Enteral Tube daily  doxazosin 2 milliGRAM(s) Oral at bedtime  droxidopa 300 milliGRAM(s) Oral every 8 hours  folic acid Injectable 1 milliGRAM(s) IV Push daily  glucagon  Injectable 1 milliGRAM(s) IntraMuscular once  insulin lispro (ADMELOG) corrective regimen sliding scale   SubCutaneous every 6 hours  magnesium sulfate  IVPB 1 Gram(s) IV Intermittent once  midodrine 30 milliGRAM(s) Oral every 8 hours  pantoprazole  Injectable 40 milliGRAM(s) IV Push every 12 hours  phenylephrine    Infusion 0.25 MICROgram(s)/kG/Min (9.25 mL/Hr) IV Continuous <Continuous>  piperacillin/tazobactam IVPB.. 3.375 Gram(s) IV Intermittent every 8 hours  potassium chloride   Powder 40 milliEquivalent(s) Oral once  sodium bicarbonate 1300 milliGRAM(s) Oral three times a day  thiamine Injectable 100 milliGRAM(s) IV Push daily  vancomycin    Solution 125 milliGRAM(s) Oral every 6 hours  vasopressin Infusion 0.04 Unit(s)/Min (6 mL/Hr) IV Continuous <Continuous>    MEDICATIONS  (PRN):  acetaminophen     Tablet .. 650 milliGRAM(s) Oral every 6 hours PRN Temp greater or equal to 38C (100.4F)      Social History: No pertinent SHx    Family history: No pertinent FHx    ROS:   ENT: all negative except as noted in HPI   CV: denies palpitations  Pulm: denies SOB, cough, hemoptysis  GI: denies change in appetite, indigestion, n/v  : denies pertinent urinary symptoms, urgency  Neuro: denies numbness/tingling, loss of sensation  Psych: denies anxiety  MS: denies muscle weakness, instability  Heme: denies easy bruising or bleeding  Endo: denies heat/cold intolerance, excessive sweating  Vascular: denies LE edema    Vital Signs Last 24 Hrs  T(C): 37.2 (17 Jul 2024 15:00), Max: 38.7 (16 Jul 2024 23:00)  T(F): 99 (17 Jul 2024 15:00), Max: 101.7 (16 Jul 2024 23:00)  HR: 128 (17 Jul 2024 18:15) (105 - 176)  BP: 108/57 (17 Jul 2024 18:15) (89/62 - 184/116)  BP(mean): 72 (17 Jul 2024 18:15) (67 - 140)  RR: 29 (17 Jul 2024 18:15) (24 - 46)  SpO2: 100% (17 Jul 2024 18:15) (91% - 100%)    Parameters below as of 17 Jul 2024 11:01  Patient On (Oxygen Delivery Method): room air                              10.1   15.02 )-----------( 84       ( 17 Jul 2024 11:54 )             31.4    07-17    143  |  112<H>  |  59<H>  ----------------------------<  187<H>  3.2<L>   |  14<L>  |  1.40<H>    Ca    7.3<L>      17 Jul 2024 11:54  Phos  3.0     07-17  Mg     1.7     07-17    TPro  4.6<L>  /  Alb  2.2<L>  /  TBili  0.9  /  DBili  x   /  AST  106<H>  /  ALT  68<H>  /  AlkPhos  440<H>  07-17   PT/INR - ( 17 Jul 2024 11:54 )   PT: 12.0 sec;   INR: 1.09 ratio         PTT - ( 17 Jul 2024 11:54 )  PTT:31.3 sec    PHYSICAL EXAM:  Gen: NAD  Skin: No rashes, bruises, or lesions  Head: Normocephalic, Atraumatic  Face: no edema, erythema, or fluctuance. Parotid glands soft without mass  Eyes: no scleral injection  Nose: +NGT in R. nare. Nares bilaterally patent, no discharge  Mouth: No Stridor / Drooling / Trismus. Mucosa moist, tongue/uvula midline, oropharynx clear  Neck: Flat, supple, no lymphadenopathy, trachea midline, no masses  Lymphatic: No lymphadenopathy  Resp: breathing easily, no stridor  CV: no peripheral edema/cyanosis  GI: nondistended   Peripheral vascular: no JVD or edema  Neuro: facial nerve intact, no facial droop          Flexible Laryngoscopy: (Scope #2 used)  Reason for Laryngoscopy: Vallecular lesion noted on FEES by SLP    + B/l vocal cords mobile with small glottic gap. Small granulomas noted on b/l VC. 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 Nasopharynx, oropharynx, and hypopharynx clear, no bleeding. Tongue base, posterior pharyngeal wall, epiglottis, and subglottis appear normal. No erythema, edema, pooling of secretions, masses or lesions. Airway patent, no foreign body visualized. No glottic/supraglottic edema. Arytenoids, vestibular folds, ventricles, pyriform sinuses, and aryepiglottic folds appear normal bilaterally.

## 2024-07-17 NOTE — PROGRESS NOTE ADULT - SUBJECTIVE AND OBJECTIVE BOX
[facility]   [phone]  RENAL FOLLOW UP NOTE  --------------------------------------------------------------------------------  Chief Complaint:    24 hour events/subjective:    seen earlier   awake and answers with head gestures mainly     PAST HISTORY  --------------------------------------------------------------------------------  No significant changes to PMH, PSH, FHx, SHx, unless otherwise noted    ALLERGIES & MEDICATIONS  --------------------------------------------------------------------------------  Allergies    No Known Allergies    Intolerances      Standing Inpatient Medications  aMIOdarone    Tablet 400 milliGRAM(s) Oral every 8 hours  aMIOdarone    Tablet   Oral   chlorhexidine 2% Cloths 1 Application(s) Topical <User Schedule>  clopidogrel Tablet 75 milliGRAM(s) Enteral Tube daily  doxazosin 2 milliGRAM(s) Oral at bedtime  droxidopa 300 milliGRAM(s) Oral every 8 hours  folic acid Injectable 1 milliGRAM(s) IV Push daily  insulin lispro (ADMELOG) corrective regimen sliding scale   SubCutaneous every 6 hours  midodrine 30 milliGRAM(s) Oral every 8 hours  pantoprazole  Injectable 40 milliGRAM(s) IV Push every 12 hours  phenylephrine    Infusion 0.25 MICROgram(s)/kG/Min IV Continuous <Continuous>  piperacillin/tazobactam IVPB.. 3.375 Gram(s) IV Intermittent every 8 hours  sodium bicarbonate 1300 milliGRAM(s) Oral three times a day  thiamine Injectable 100 milliGRAM(s) IV Push daily  vancomycin    Solution 125 milliGRAM(s) Oral every 6 hours    PRN Inpatient Medications  acetaminophen     Tablet .. 650 milliGRAM(s) Oral every 6 hours PRN      REVIEW OF SYSTEMS  --------------------------------------------------------------------------------    Gen: denies  fevers/chills,  CVS: denies chest pain/palpitations  Resp: denies SOB/Cough  GI: Denies N/V/Abd pain  :  +  dysuria    VITALS/PHYSICAL EXAM  --------------------------------------------------------------------------------  T(C): 37.2 (07-17-24 @ 15:00), Max: 38.7 (07-16-24 @ 23:00)  HR: 130 (07-17-24 @ 19:15) (107 - 176)  BP: 112/54 (07-17-24 @ 19:15) (89/62 - 184/116)  RR: 27 (07-17-24 @ 19:15) (21 - 46)  SpO2: 100% (07-17-24 @ 19:15) (91% - 100%)  Wt(kg): --        07-16-24 @ 07:01  -  07-17-24 @ 07:00  --------------------------------------------------------  IN: 5470.5 mL / OUT: 1195 mL / NET: 4275.5 mL    07-17-24 @ 07:01  -  07-17-24 @ 20:32  --------------------------------------------------------  IN: 996.6 mL / OUT: 500 mL / NET: 496.6 mL      Physical Exam:  	      Gen:  on O2 on pressors   	Pulm: decrease bs  no rales  +  ronchi  -  wheezing  	CV: No JVD. RRR, S1S2; no rub  	Abd: +BS, soft /nondistended  	: No bladder distention   	UE: Warm, no cyanosis  no clubbing,  no edema  	LE: Warm, no cyanosis  no clubbing,  4+  edema  	Neuro:  alert     LABS/STUDIES  --------------------------------------------------------------------------------              10.1   15.02 >-----------<  84       [07-17-24 @ 11:54]              31.4     143  |  112  |  59  ----------------------------<  187      [07-17-24 @ 11:54]  3.2   |  14  |  1.40        Ca     7.3     [07-17-24 @ 11:54]      Mg     1.7     [07-17-24 @ 00:42]      Phos  3.0     [07-17-24 @ 11:54]    TPro  4.6  /  Alb  2.2  /  TBili  0.9  /  DBili  x   /  AST  106  /  ALT  68  /  AlkPhos  440  [07-17-24 @ 11:54]    PT/INR: PT 12.0 , INR 1.09       [07-17-24 @ 11:54]  PTT: 31.3       [07-17-24 @ 11:54]      Creatinine Trend:  SCr 1.40 [07-17 @ 11:54]  SCr 1.51 [07-17 @ 00:42]  SCr 1.53 [07-16 @ 12:22]  SCr 1.66 [07-16 @ 02:16]  SCr 1.61 [07-15 @ 11:50]      TSH 2.47      [07-04-24 @ 12:31]

## 2024-07-17 NOTE — CONSULT NOTE ADULT - PROBLEM SELECTOR RECOMMENDATION 9
- Follow up outpatient with Laryngologist Dr. Field, [address] [place] [address] Rd, [phone].  - No further ENT intervention required at this time, please call back if needed.
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
- s/p 5 units of RBC   - CT A/P 7/9 - Active bleeding in the third portion of the duodenum. Progression of liver metastases.  - EGD 7/9 showed esophagitis, One non-bleeding duodenal ulcer with a clean ulcer base (Arjun Class III). One oozing duodenal ulcer with spurting hemorrhage (Arjun Class Ia). Treatment not successful. Treated with bipolar cautery.  - s/p IR embolization on 7/9  - per GI - no plan for another scope unless rebleeds  - per IR - no plan for procedure as of now   - continue to f/u with GI and IR
yes

## 2024-07-17 NOTE — PROGRESS NOTE ADULT - ASSESSMENT
79 yo male with PMH of CAD s/p PCI x3 with most recent stent 6/12/24 on Plavix, chronic Afib on eliquis, orthostatic hypotension on midodrine, PAD, neuroendocrine tumor with RT currently on hold, recent admission for dx cath on 6/24/24 who presented for hypotension, admitted for GIB and hemorrhagic shock. Found to be bleeding from duodenum. Transferred to MICU, on pressors.     1. Pancreatic NET- metastatic   -- was on lanreotide then had POD in liver and started on peptide receptor radiotherapy (PRRT)- typically given every 8 weeks for 4 doses. He received one dose on 10/20/2023 and planned to get his 2nd dose at the end of December however his course was complicated by multiple hospitalizations that were noncancer related (decompensated heart failure).   -- 5/28/24 PET Dotatate (compared to 9/2023): several new somatostatin avid osseous lesions, some faintly sclerotic, suspicious for mets. Increased upper RP nodes, probably metastatic. Decreased intensely tracer avid right supradiaphragmatic and upper abdominal nodes, suspicious for metastasis. Redemonstrated intensely somatostatin avid bilobar hepatic lesions, consistent with metastases again morphologically difficult to delineate.   -- CT here reviewed by [facility]: SINCE MAY 8 2024 INCREASED HEPATIC METASTASES, NEWLY SEEN PRIMARY TUMOR IN THE PANCREAS, and active bleeding within the duodenum with associated intraluminal hematoma.   -- chromogranin level is elevated at 2058, but [place] [facility] [place]   -- f/u with Dr. Sophia Prasad at [facility] [place] after discharge, no plan for treatment inpatient, if clinically improves/stabilizes then can be considered for treatment outpatient    2. Duodenal bleed, Hemorrhagic shock  - Cardiology following, currently on 2 pressors   - Per endocrine, adrenal insufficiency ruled out given AM cortisol of 17, continues midodrine  - GI PCR indeterminate for norovirus on repeat test  - RRT 7/9 for Hypotension, shortness of breath, drop in hemoglobin.   - CT w/ bleed in duodenum. GI called, EGD 7/9 -> S/p  IR embolization 7/9, intubated in MICU -> s/p extubation 7/15  - s/p multiple transfusions, fibrinogen low would recommend Cry for < 100, but coags have improved as are essentially normal now so unlikely, probably was related to liver dysfunction.   Can check factor levels V, VIII, X   - Once infection ruled out, may start octreotide 150 mcg BID  - s/p repeat EGD 7/12 showing duodenal lesions w/clots and oozing, no clear targets for intervention and no active bleeding, purastat applied to all areas of oozing. Hgb stable today, no overt bleeding    3. C. diff diarrhea  - on oral paulino Duque NP  Hematology/Oncology  [facility]  [phone] (Office)  [phone] (Alt office)  Evenings and weekends please call MD on call or office

## 2024-07-17 NOTE — OCCUPATIONAL THERAPY INITIAL EVALUATION ADULT - PERTINENT HX OF CURRENT PROBLEM, REHAB EVAL
77 yo male with PMH of CAD s/p PCI x3 with most recent stent 6/12/24 on Plavix, chronic Afib on eliquis, orthostatic hypotension on midodrine, PAD, 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 Patient states since discharge on this past Saturday, he continued to feel ongoing generalized weakness and dizziness with positional changes despite compliance with home midodrine 15mg TID and holding torsemide as instructed. Admits to poor PO intake of fluids/solute due to ongoing issues with poor appetite and nausea with meals which is not new. Denies any fever, chills, chest pain, SOB, cough, abd pain, dysuria nor urinary frequency. Has chronic diarrhea that is unchanged from his baseline. In the ED, vitals notable for SBP 92-11s. Labs notable for elevated BUN/Cr 31/1.59 (from 30/1.18 on day of discharge 6/29/24). CXR with clear lungs. Abd US with innumerable intrahepatic echogenic masses consistent with known metastatic neuroendocrine tumor. Admitted to medicine for symptomatic hypotension and RAZIA.  US abdomen-Innumerable intrahepatic echogenic masses consistent with known metastatic neuroendocrine tumor.  CT abdomen/pelvis-Active bleeding in the third portion of the duodenum. Progression of liver metastases.  RRT called 7/9 for hypotension   - 2/2 to Upper GI bleeds, urgently took to EGD, found bleeding ulcer that was not well controlled, underwent IR empiric embolization (7/9). Admitted to MICU for further monitoring i.s.o hemorrhagic shock 2/2 GI bleed.   - s/p 5 pRBC prior to MICU, and 4 pRBC, 2 FFP, 2 platelets in MICU

## 2024-07-17 NOTE — PROGRESS NOTE ADULT - NS ATTEND AMEND GEN_ALL_CORE FT
continues for management of cdiff. still having diarrhea. once infection clears can starts octreotide ,.  plt donw trending. likely from infection and icu. will cont to monitor for now

## 2024-07-17 NOTE — SWALLOW FEES ASSESSMENT ADULT - DIAGNOSTIC IMPRESSIONS
Pt is a 77 y/o male w/ PMHx of metastatic neuroendocrine pancreatic cancer (tx on hold) admitted to medicine for symptomatic hypotension and RAZIA. Hospital course remarkable for acute onset melena/coffee ground emesis, hypotension, and GI bleed w/ intubation 7/9-7/15. Pt p/w oropharyngeal dysphagia, suspected d/t overall deconditioning and intubation related. Swallow profile remarkable for silent aspiration during and after the swallow on conservative textures. Deficits include; reduced bolus control w/ uncontrolled loss, delay in pharyngeal trigger, suspected reduced epiglottis retroflexion as evidenced by absent white out phase, reduced pharyngeal contraction, and reduced laryngeal vestibule closure. Pt unable to clear aspirated material despite cued coughing/volitional swallow. Will reassess swallow profile as overall status improves.     Disorders: reduced lingual strength/control, delayed initiation of pharyngeal trigger, reduced base of tongue retraction, pharyngeal contractibility, reduced supraglottic sensation, reduced subglottic sensation

## 2024-07-17 NOTE — CONSULT NOTE ADULT - ASSESSMENT
79 y/o Male with PMHx of CAD s/p PCI x3, with most recent stent 6/12/24 on Plavix, chronic Afib on eliquis, orthostatic hypotension on midodrine, PAD, neuroendocrine tumor with RT currently on hold, recent admission for dx cath on 6/24/24 who initially presents from PCP's office for hypotension despite taking AM midodrine dose. ENT consulted by SLP for vallecular lesion noted on FEES. Of note, pt was intubated from 7/9 to 7/15. On exam, + B/l vocal cords mobile with small glottic gap. Small granulomas noted on b/l VC. 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

## 2024-07-17 NOTE — SWALLOW FEES ASSESSMENT ADULT - PHARYNGEAL PHASE COMMENTS
suspected significantly reduced epiglottis retroflexion as evidenced by absent white out phase suspected significantly reduced epiglottis retroflexion as evidenced by absent white out phase  > notable desaturation to 83, self resolved back to 94

## 2024-07-17 NOTE — OCCUPATIONAL THERAPY INITIAL EVALUATION ADULT - TRANSFER TRAINING, PT EVAL
Patient will transfer to toilet with DME as needed with minimal assistance in 4 weeks. Pt will perform SPT min A within 4 weeks.

## 2024-07-17 NOTE — PROGRESS NOTE ADULT - SUBJECTIVE AND OBJECTIVE BOX
Patient is a 78y old  Male who presents with a chief complaint of hypotension (17 Jul 2024 05:00)      MEDICATIONS  (STANDING):  aMIOdarone Infusion 0.5 mG/Min (16.7 mL/Hr) IV Continuous <Continuous>  aMIOdarone Infusion 1 mG/Min (33.3 mL/Hr) IV Continuous <Continuous>  atorvastatin 80 milliGRAM(s) Oral at bedtime  chlorhexidine 2% Cloths 1 Application(s) Topical <User Schedule>  clopidogrel Tablet 75 milliGRAM(s) Enteral Tube daily  doxazosin 2 milliGRAM(s) Oral at bedtime  droxidopa 200 milliGRAM(s) Oral every 8 hours  folic acid Injectable 1 milliGRAM(s) IV Push daily  glucagon  Injectable 1 milliGRAM(s) IntraMuscular once  insulin lispro (ADMELOG) corrective regimen sliding scale   SubCutaneous every 6 hours  magnesium sulfate  IVPB 1 Gram(s) IV Intermittent once  midodrine 30 milliGRAM(s) Oral every 8 hours  pantoprazole  Injectable 40 milliGRAM(s) IV Push every 12 hours  phenylephrine    Infusion 0.25 MICROgram(s)/kG/Min (9.25 mL/Hr) IV Continuous <Continuous>  piperacillin/tazobactam IVPB.. 3.375 Gram(s) IV Intermittent every 8 hours  potassium chloride   Powder 40 milliEquivalent(s) Oral once  sodium bicarbonate 1300 milliGRAM(s) Oral three times a day  thiamine Injectable 100 milliGRAM(s) IV Push daily  vancomycin    Solution 125 milliGRAM(s) Oral every 6 hours  vasopressin Infusion 0.04 Unit(s)/Min (6 mL/Hr) IV Continuous <Continuous>    MEDICATIONS  (PRN):  acetaminophen     Tablet .. 650 milliGRAM(s) Oral every 6 hours PRN Temp greater or equal to 38C (100.4F)      ROS  No fever, sweats, chills  No epistaxis, HA, sore throat  No CP, SOB, cough, sputum  No n/v/d, abd pain, melena, hematochezia  No edema  No rash  No anxiety  No back pain, joint pain  No bleeding, bruising  No dysuria, hematuria    Vital Signs Last 24 Hrs  T(C): 37.4 (17 Jul 2024 07:00), Max: 38.7 (16 Jul 2024 23:00)  T(F): 99.3 (17 Jul 2024 07:00), Max: 101.7 (16 Jul 2024 23:00)  HR: 111 (17 Jul 2024 09:30) (105 - 176)  BP: 121/68 (17 Jul 2024 09:30) (87/54 - 184/116)  BP(mean): 89 (17 Jul 2024 09:30) (64 - 140)  RR: 27 (17 Jul 2024 09:30) (22 - 46)  SpO2: 99% (17 Jul 2024 09:30) (92% - 100%)    Parameters below as of 17 Jul 2024 03:00  Patient On (Oxygen Delivery Method): room air    O2 Concentration (%): 21    PE  NAD  Lethargic  Anicteric, MMM  RRR  CTAB  Abd soft, NT, ND  No c/c/e  No rash grossly                            10.3   11.64 )-----------( 86       ( 17 Jul 2024 00:41 )             32.1       07-17    143  |  111<H>  |  63<H>  ----------------------------<  301<H>  3.9   |  14<L>  |  1.51<H>    Ca    7.3<L>      17 Jul 2024 00:42  Phos  3.5     07-17  Mg     1.7     07-17    TPro  4.7<L>  /  Alb  2.3<L>  /  TBili  1.3<H>  /  DBili  x   /  AST  129<H>  /  ALT  72<H>  /  AlkPhos  484<H>  07-17       Patient is a 78y old  Male who presents with a chief complaint of hypotension (17 Jul 2024 05:00)      MEDICATIONS  (STANDING):  aMIOdarone Infusion 0.5 mG/Min (16.7 mL/Hr) IV Continuous <Continuous>  aMIOdarone Infusion 1 mG/Min (33.3 mL/Hr) IV Continuous <Continuous>  atorvastatin 80 milliGRAM(s) Oral at bedtime  chlorhexidine 2% Cloths 1 Application(s) Topical <User Schedule>  clopidogrel Tablet 75 milliGRAM(s) Enteral Tube daily  doxazosin 2 milliGRAM(s) Oral at bedtime  droxidopa 200 milliGRAM(s) Oral every 8 hours  folic acid Injectable 1 milliGRAM(s) IV Push daily  glucagon  Injectable 1 milliGRAM(s) IntraMuscular once  insulin lispro (ADMELOG) corrective regimen sliding scale   SubCutaneous every 6 hours  magnesium sulfate  IVPB 1 Gram(s) IV Intermittent once  midodrine 30 milliGRAM(s) Oral every 8 hours  pantoprazole  Injectable 40 milliGRAM(s) IV Push every 12 hours  phenylephrine    Infusion 0.25 MICROgram(s)/kG/Min (9.25 mL/Hr) IV Continuous <Continuous>  piperacillin/tazobactam IVPB.. 3.375 Gram(s) IV Intermittent every 8 hours  potassium chloride   Powder 40 milliEquivalent(s) Oral once  sodium bicarbonate 1300 milliGRAM(s) Oral three times a day  thiamine Injectable 100 milliGRAM(s) IV Push daily  vancomycin    Solution 125 milliGRAM(s) Oral every 6 hours  vasopressin Infusion 0.04 Unit(s)/Min (6 mL/Hr) IV Continuous <Continuous>    MEDICATIONS  (PRN):  acetaminophen     Tablet .. 650 milliGRAM(s) Oral every 6 hours PRN Temp greater or equal to 38C (100.4F)          Vital Signs Last 24 Hrs  T(C): 37.4 (17 Jul 2024 07:00), Max: 38.7 (16 Jul 2024 23:00)  T(F): 99.3 (17 Jul 2024 07:00), Max: 101.7 (16 Jul 2024 23:00)  HR: 111 (17 Jul 2024 09:30) (105 - 176)  BP: 121/68 (17 Jul 2024 09:30) (87/54 - 184/116)  BP(mean): 89 (17 Jul 2024 09:30) (64 - 140)  RR: 27 (17 Jul 2024 09:30) (22 - 46)  SpO2: 99% (17 Jul 2024 09:30) (92% - 100%)    Parameters below as of 17 Jul 2024 03:00  Patient On (Oxygen Delivery Method): room air    O2 Concentration (%): 21    PE  NAD  Lethargic  Anicteric, MMM  RRR  CTAB                              10.3   11.64 )-----------( 86       ( 17 Jul 2024 00:41 )             32.1       07-17    143  |  111<H>  |  63<H>  ----------------------------<  301<H>  3.9   |  14<L>  |  1.51<H>    Ca    7.3<L>      17 Jul 2024 00:42  Phos  3.5     07-17  Mg     1.7     07-17    TPro  4.7<L>  /  Alb  2.3<L>  /  TBili  1.3<H>  /  DBili  x   /  AST  129<H>  /  ALT  72<H>  /  AlkPhos  484<H>  07-17

## 2024-07-17 NOTE — OCCUPATIONAL THERAPY INITIAL EVALUATION ADULT - LIVES WITH, PROFILE
Pvt home, 2-3 steps to enter with handrail. 1 flight to bed and bath. (I) ADLs and functional transfers with straight cane. (-) durable medical equipment. +/spouse

## 2024-07-17 NOTE — CHART NOTE - NSCHARTNOTEFT_GEN_A_CORE
NUTRITION FOLLOW UP NOTE    PATIENT SEEN FOR: length of stay follow up.     SOURCE: [] Patient  [x] Current Medical Record  [x] RN  [] Family/support person at bedside  [x] Patient unavailable/inappropriate  [x] Other: Interdisciplinary Rounds     CHART REVIEWED/EVENTS NOTED.  [] No changes to nutrition care plan to note  [x] Nutrition Status:  - Extubated 7/15  - Hemorrhagic shock. Pressors: Phenylephrine at 0.5 micrograms/kG/min and Vasopressin at 0.04 units/min   - RAZIA  - Seen by SLP 7/16 with recommendation, "NPO, with non-oral nutrition/hydration/medications." Pending FEES today.   - Upper GI Bleed s/p 5 units pRBC prior to MICU admission   - Metastatic neuroendocrine pancreatic cancer     DIET ORDER:   Diet, NPO with Tube Feed:   Tube Feeding Modality: Nasogastric  Ensure Clear  Total Volume for 24 Hours (mL): 1170  Continuous  Starting Tube Feed Rate {mL per Hour}: 10  Increase Tube Feed Rate by (mL): 10     Every 6 hours  Until Goal Tube Feed Rate (mL per Hour): 65  Tube Feed Duration (in Hours): 18  Tube Feed Start Time: 11:00  Tube Feed Stop Time: 05:00 (07-16-24 @ 02:54)    CURRENT DIET ORDER IS:  [] Appropriate:  [] Inadequate:  [x] Other: See recommendations below     NUTRITION INTAKE/PROVISION:  [] PO:   [x] Enteral Nutrition:  Order Provides: 1170 ml formula, 1185 kcals, 39.5 gm protein.   Current Pump Rate: On hold for MICU 18 hr feeds. Was at 65 mL/hr  5-Day Average Enteral Provision per RN flowsheet: 440 mL, 446 kcals, 15 g protein   [] Parenteral Nutrition:    ANTHROPOMETRICS:  Drug Dosing Weight  Height (cm): 188 (09 Jul 2024 13:27)  Weight (kg): 98.7 (09 Jul 2024 13:27)  BMI (kg/m2): 27.9 (09 Jul 2024 13:27)    Weights:   Daily: None to address. RD unable to obtain bed scale wt     NUTRITIONALLY PERTINENT MEDICATIONS:  MEDICATIONS  (STANDING):  aMIOdarone    Tablet   Oral   aMIOdarone Infusion 1 mG/Min (33.3 mL/Hr) IV Continuous <Continuous>  aMIOdarone Infusion 0.5 mG/Min (16.7 mL/Hr) IV Continuous <Continuous>  clopidogrel Tablet 75 milliGRAM(s) Enteral Tube daily  doxazosin 2 milliGRAM(s) Oral at bedtime  droxidopa 300 milliGRAM(s) Oral every 8 hours  folic acid Injectable 1 milliGRAM(s) IV Push daily  glucagon  Injectable 1 milliGRAM(s) IntraMuscular once  insulin lispro (ADMELOG) corrective regimen sliding scale   SubCutaneous every 6 hours  magnesium sulfate  IVPB 1 Gram(s) IV Intermittent once  midodrine 30 milliGRAM(s) Oral every 8 hours  pantoprazole  Injectable 40 milliGRAM(s) IV Push every 12 hours  phenylephrine    Infusion 0.25 MICROgram(s)/kG/Min (9.25 mL/Hr) IV Continuous <Continuous>  piperacillin/tazobactam IVPB.. 3.375 Gram(s) IV Intermittent every 8 hours  potassium chloride   Powder 40 milliEquivalent(s) Oral once  sodium bicarbonate 1300 milliGRAM(s) Oral three times a day  thiamine Injectable 100 milliGRAM(s) IV Push daily  vancomycin    Solution 125 milliGRAM(s) Oral every 6 hours  vasopressin Infusion 0.04 Unit(s)/Min (6 mL/Hr) IV Continuous <Continuous>    NUTRITIONALLY PERTINENT LABS:  07-17 Na143 mmol/L Glu 301 mg/dL<H> K+ 3.9 mmol/L Cr  1.51 mg/dL<H> BUN 63 mg/dL<H>   07-17 Phos 3.5 mg/dL   07-17 Alb 2.3 g/dL<L>  07-17 ALT 72 U/L<H>  U/L<H> Alkaline Phosphatase 484 U/L<H>  07-15-24 @ 09:38 a1c 6.4    A1C with Estimated Average Glucose Result: 6.4 % (07-15-24 @ 09:38)    Finger Sticks:  POCT Blood Glucose.: 348 mg/dL (07-17 @ 05:54)  POCT Blood Glucose.: 317 mg/dL (07-17 @ 01:36)  POCT Blood Glucose.: 185 mg/dL (07-16 @ 17:07)  POCT Blood Glucose.: 154 mg/dL (07-16 @ 12:12)    NUTRITIONALLY PERTINENT MEDICATIONS/LABS:  [x] Reviewed  [x] Relevant notes on medications/labs:  - Ordered for thiamine, folic acid, magnesium sulfate, and potassium chloride   - On antibiotics   - Elevated BG; sliding scale of insulin     EDEMA:  [x] Reviewed: +1/+2/+3 generalized  [] Relevant notes:    GI/ I&O:  [x] Reviewed  [x] Relevant notes: Last BM 7/17 x1 thus far   [x] Other: Extensive GI Hx, see note.     SKIN:   [x] No pressure injuries documented, per nursing flowsheet  [] Pressure injury previously noted  [] Change in pressure injury documentation:  [] Other:    ESTIMATED NEEDS:  Based on dosing wt 98.7 kg   Energy: (23-28 kcal/kg) 2270 - 2764 kcal/day  Protein: (1.2-1.4 g/kg) 118 - 138 g/day   Fluid needs deferred to provider.     NUTRITION DIAGNOSIS:  [x] Prior Dx: 1) Severe chronic malnutrition 2) Increased Nutrient Needs  [] New Dx:    EDUCATION:  [] Yes:  [x] Not appropriate/warranted    NUTRITION CARE PLAN:  1. Diet: Vital 1.5 at 30 mL/hr increasing only as tolerated and electrolytes WNL to goal rate 85 mL/hr x18 hours with ProSource TF Free x1 daily to provide total volume 1530 mL, 2385 kcals, 118 gm protein, and 1169 mL free water. Meets 24 kcals/kG and 1.2 gm protein/kG based on dosing wt 98.7 kG.   2. Multivitamin/mineral supplementation: As medically feasible, continue to provide thiamine and consider multivitamin for refeeding risk.     [] Achieved - Continue current nutrition intervention(s)  [] Current medical condition precludes nutrition intervention at this time.    MONITORING AND EVALUATION:   RD remains available upon request and will follow up per protocol.    Khadijah Vigil, MS, RD, CDN, CNSC, [facility] TEAMS NUTRITION FOLLOW UP NOTE    PATIENT SEEN FOR: length of stay follow up.     SOURCE: [] Patient  [x] Current Medical Record  [x] RN  [] Family/support person at bedside  [x] Patient unavailable/inappropriate  [x] Other: Interdisciplinary Rounds     CHART REVIEWED/EVENTS NOTED.  [] No changes to nutrition care plan to note  [x] Nutrition Status:  - Extubated 7/15  - Hemorrhagic shock. Pressors: Phenylephrine at 0.5 micrograms/kG/min and Vasopressin at 0.04 units/min   - RAZIA  - Seen by SLP 7/16 with recommendation, "NPO, with non-oral nutrition/hydration/medications." Pending FEES today.   - Upper GI Bleed s/p 5 units pRBC prior to MICU admission   - Metastatic neuroendocrine pancreatic cancer     DIET ORDER:   Diet, NPO with Tube Feed:   Tube Feeding Modality: Nasogastric  Ensure Clear  Total Volume for 24 Hours (mL): 1170  Continuous  Starting Tube Feed Rate {mL per Hour}: 10  Increase Tube Feed Rate by (mL): 10     Every 6 hours  Until Goal Tube Feed Rate (mL per Hour): 65  Tube Feed Duration (in Hours): 18  Tube Feed Start Time: 11:00  Tube Feed Stop Time: 05:00 (07-16-24 @ 02:54)    CURRENT DIET ORDER IS:  [] Appropriate:  [] Inadequate:  [x] Other: See recommendations below     NUTRITION INTAKE/PROVISION:  [] PO:   [x] Enteral Nutrition:  Order Provides: 1170 ml formula, 1185 kcals, 39.5 gm protein.   Current Pump Rate: On hold for MICU 18 hr feeds. Was at 65 mL/hr  5-Day Average Enteral Provision per RN flowsheet: 440 mL, 446 kcals, 15 g protein   [] Parenteral Nutrition:    ANTHROPOMETRICS:  Drug Dosing Weight  Height (cm): 188 (09 Jul 2024 13:27)  Weight (kg): 98.7 (09 Jul 2024 13:27)  BMI (kg/m2): 27.9 (09 Jul 2024 13:27)    Weights:   Daily: None to address.   Pt being worked on by provider, RD unable to obtain wt.     NUTRITIONALLY PERTINENT MEDICATIONS:  MEDICATIONS  (STANDING):  aMIOdarone    Tablet   Oral   aMIOdarone Infusion 1 mG/Min (33.3 mL/Hr) IV Continuous <Continuous>  aMIOdarone Infusion 0.5 mG/Min (16.7 mL/Hr) IV Continuous <Continuous>  clopidogrel Tablet 75 milliGRAM(s) Enteral Tube daily  doxazosin 2 milliGRAM(s) Oral at bedtime  droxidopa 300 milliGRAM(s) Oral every 8 hours  folic acid Injectable 1 milliGRAM(s) IV Push daily  glucagon  Injectable 1 milliGRAM(s) IntraMuscular once  insulin lispro (ADMELOG) corrective regimen sliding scale   SubCutaneous every 6 hours  magnesium sulfate  IVPB 1 Gram(s) IV Intermittent once  midodrine 30 milliGRAM(s) Oral every 8 hours  pantoprazole  Injectable 40 milliGRAM(s) IV Push every 12 hours  phenylephrine    Infusion 0.25 MICROgram(s)/kG/Min (9.25 mL/Hr) IV Continuous <Continuous>  piperacillin/tazobactam IVPB.. 3.375 Gram(s) IV Intermittent every 8 hours  potassium chloride   Powder 40 milliEquivalent(s) Oral once  sodium bicarbonate 1300 milliGRAM(s) Oral three times a day  thiamine Injectable 100 milliGRAM(s) IV Push daily  vancomycin    Solution 125 milliGRAM(s) Oral every 6 hours  vasopressin Infusion 0.04 Unit(s)/Min (6 mL/Hr) IV Continuous <Continuous>    NUTRITIONALLY PERTINENT LABS:  07-17 Na143 mmol/L Glu 301 mg/dL<H> K+ 3.9 mmol/L Cr  1.51 mg/dL<H> BUN 63 mg/dL<H>   07-17 Phos 3.5 mg/dL   07-17 Alb 2.3 g/dL<L>  07-17 ALT 72 U/L<H>  U/L<H> Alkaline Phosphatase 484 U/L<H>  07-15-24 @ 09:38 a1c 6.4    A1C with Estimated Average Glucose Result: 6.4 % (07-15-24 @ 09:38)    Finger Sticks:  POCT Blood Glucose.: 348 mg/dL (07-17 @ 05:54)  POCT Blood Glucose.: 317 mg/dL (07-17 @ 01:36)  POCT Blood Glucose.: 185 mg/dL (07-16 @ 17:07)  POCT Blood Glucose.: 154 mg/dL (07-16 @ 12:12)    NUTRITIONALLY PERTINENT MEDICATIONS/LABS:  [x] Reviewed  [x] Relevant notes on medications/labs:  - Ordered for thiamine, folic acid, magnesium sulfate, and potassium chloride   - On antibiotics   - Elevated BG; sliding scale of insulin     EDEMA:  [x] Reviewed: +1/+2/+3 generalized  [] Relevant notes:    GI/ I&O:  [x] Reviewed  [x] Relevant notes: Last BM 7/17 x1 thus far   [x] Other: Extensive GI Hx, see note.     SKIN:   [x] No pressure injuries documented, per nursing flowsheet  [] Pressure injury previously noted  [] Change in pressure injury documentation:  [] Other:    ESTIMATED NEEDS:  Based on dosing wt 98.7 kg   Energy: (23-28 kcal/kg) 2270 - 2764 kcal/day  Protein: (1.2-1.4 g/kg) 118 - 138 g/day   Fluid needs deferred to provider.     NUTRITION DIAGNOSIS:  [x] Prior Dx: 1) Severe chronic malnutrition 2) Increased Nutrient Needs  [] New Dx:    EDUCATION:  [] Yes:  [x] Not appropriate/warranted    NUTRITION CARE PLAN:  1. Diet: Vital 1.5 at 30 mL/hr increasing only as tolerated and electrolytes WNL to goal rate 85 mL/hr x18 hours with ProSource TF Free x1 daily to provide total volume 1530 mL, 2385 kcals, 118 gm protein, and 1169 mL free water. Meets 24 kcals/kG and 1.2 gm protein/kG based on dosing wt 98.7 kG.   2. Multivitamin/mineral supplementation: As medically feasible, continue to provide thiamine and consider multivitamin for refeeding risk.     [] Achieved - Continue current nutrition intervention(s)  [] Current medical condition precludes nutrition intervention at this time.    MONITORING AND EVALUATION:   RD remains available upon request and will follow up per protocol.    Khadijah Vigil, MS, RD, CDN, CNSC, [facility] TEAMS

## 2024-07-17 NOTE — PROGRESS NOTE ADULT - ASSESSMENT
77 yo male with PMH of CAD s/p PCI x3 with most recent stent 6/12/24 on Plavix, chronic Afib on eliquis, orthostatic hypotension on midodrine, PAD, 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 Patient states since discharge on this past Saturday, he continued to feel ongoing generalized weakness and dizziness with positional changes despite compliance with home midodrine 15mg TID and holding torsemide as instructed as of day PTA . Admits to poor PO intake of fluids/solute due to ongoing issues with poor appetite and nausea with meals which is not new. In the ED, vitals notable for SBP 92-11s. Labs notable for elevated BUN/Cr 31/1.59 (from 30/1.18 on day of discharge 6/29/24). CXR with clear lungs. Abd US with innumerable intrahepatic echogenic masses consistent with known metastatic neuroendocrine tumor. Admitted to medicine for symptomatic hypotension and RAZIA.      1- RAZIA   2-  hypotension   3- hx chf   4- pericarditis   5- C Diff   6- acidosis       RAZIA in setting of hypotension, anemia -> GIB, hemorrhagic shock leading to oliguric ATN  received CT IV contrast, and emergently went to IR for mesenteric embolization 7/9  now creatinine is improving towards baseline   non oliguric   continue with henny drip and midodrine 30 mg tid as well as vasopressin drip   vanco 125 mg q 6 enteral   acidosis increase   sodium bicarb 1300 mg tid   strict I/O  monitor creatinine closely

## 2024-07-17 NOTE — CONSULT NOTE ADULT - NS ATTEND AMEND GEN_ALL_CORE FT
ENT consulted for laryngeal lesion    79 y/o Male with PMHx of CAD s/p PCI x3, with most recent stent 6/12/24 on Plavix, chronic Afib on eliquis, orthostatic hypotension on midodrine, PAD, neuroendocrine tumor with RT currently on hold, recent admission for dx cath on 6/24/24 who initially presents from PCP's office for hypotension despite taking AM midodrine dose.     ENT consulted by SLP for vallecular lesion noted on FEES. Of note, pt was intubated from 7/9 to 7/15.     Flexible Laryngoscopy shows + B/l vocal cords mobile with small glottic gap. Small granulomas noted on b/l VC. 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    Recommend  Vallecular cysts  - Follow up outpatient with Laryngologist Dr. Field, [address] [place] [address], [phone].  - No further ENT intervention required at this time, please call back if needed.

## 2024-07-18 NOTE — PROGRESS NOTE ADULT - ASSESSMENT
77 yo male with PMH of CAD s/p PCI x3 with most recent stent 6/12/24 on Plavix, chronic Afib on eliquis, orthostatic hypotension on midodrine, PAD, neuroendocrine tumor with RT currently on hold, recent admission for dx cath on 6/24/24 who presented for hypotension, admitted for GIB and hemorrhagic shock. Found to be bleeding from duodenum. Transferred to MICU, on pressors.     1. Pancreatic NET- metastatic   -- was on lanreotide then had POD in liver and started on peptide receptor radiotherapy (PRRT)- typically given every 8 weeks for 4 doses. He received one dose on 10/20/2023 and planned to get his 2nd dose at the end of December however his course was complicated by multiple hospitalizations that were noncancer related (decompensated heart failure).   -- 5/28/24 PET Dotatate (compared to 9/2023): several new somatostatin avid osseous lesions, some faintly sclerotic, suspicious for mets. Increased upper RP nodes, probably metastatic. Decreased intensely tracer avid right supradiaphragmatic and upper abdominal nodes, suspicious for metastasis. Redemonstrated intensely somatostatin avid bilobar hepatic lesions, consistent with metastases again morphologically difficult to delineate.   -- CT reviewed by [facility]: SINCE MAY 8 2024 INCREASED HEPATIC METASTASES, NEWLY SEEN PRIMARY TUMOR IN THE PANCREAS, and active bleeding within the duodenum with associated intraluminal hematoma.   -- chromogranin level is elevated at 2058, but no prior level at [facility] for review   -- f/u with Dr. Sophia Prasad at [facility] after discharge, no plan for treatment inpatient, if clinically improves/stabilizes then can be considered for treatment outpatient    2. Duodenal bleed, Hemorrhagic shock  - Remains in MICU, ongoing care  - CT w/ bleed in duodenum. GI called, EGD 7/9 -> S/p  IR embolization 7/9, intubated in MICU -> s/p extubation 7/15 -> intubated 7/18  - s/p multiple transfusions, fibrinogen low would recommend Cry for < 100, but coags have improved as are essentially normal now so unlikely, probably was related to liver dysfunction.   - Once infection ruled out, may start octreotide 150 mcg BID  - s/p repeat EGD 7/12 showing duodenal lesions w/clots and oozing, no clear targets for intervention and no active bleeding, purastat applied to all areas of oozing.   - Hgb low w/ ongoing melanotic stool, GI re-eval called     3. C. diff diarrhea  - on oral paulino Topete PA-C  Hematology/Oncology  [facility]  [phone] (office)

## 2024-07-18 NOTE — PROGRESS NOTE ADULT - ASSESSMENT
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, PAD, and orthostatic hypotension on midodrine presenting for lightheadedness He was admitted for initial complaint of lightheadedness and low bp despite midodrine, now with melena and coffee ground emesis and acute blood loss anemia, found to hemorrhagic shock due to duodenal bleed s/p GDA embolization now rebleeding .    Impression:  #Hemorrhagic Shock  #Acute on chronic blood loss anemia  #Duodenal bleed s/p GDA embolization  #LA Grade D Esophagitis  #C-diff infection  #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 Found to have active bleed in second portion of duodenum, unable to identify source underneath large clot on EGD so underwent empiric embolization of GDA by IR for bleeding control. Hgb started dropping after starting cangrelor with worsening renal and liver failure. Repeat EGD 7/12 showed grade D esophagitis, organized clots with extensive duodenal ulcers (some with pigmented spots) w/o active bleeding, treated with purastat. Now rebleeding with Hgb drop two points in 9 hours     Recommendations:  - Repeat endoscopy now to evaluate rebleeding  - Intubate for procedure for airway protection  - Transition to PPI gtt  - Hold plavix and eliquis   - Transfuse 2 units pRBC now  - Trend cbc and coags, maintain active T&S, transfuse Hgb>8  - Vancomycin PO for c-diff infection  - Respiratory and circulatory support per ICU team  - Appreciate palliative care involvement given guarded prognosis  - Will need PPI BID for 8 weeks for grade B esophagitis and PUD    We will sign off at this time, however please call back with questions or should any worsening/persistent issues arise. If patient develops further melena or signs of GI bleeding, please re-consult. Please provide patient with Gastroenterology Clinic to confirm appointment; [phone] ([facility] at [facility]) or [phone] ([facility] at [address] [place] [address]) or [phone] ([facility] at [address]).    All recommendations are tentative until note is attested by attending.     Bailey Choudhury, PGY4  Gastroenterology/Hepatology Fellow  Available on Microsoft Teams  [phone] (Long Range Pager)  [phone] (Short Range Pager [facility])    After 5 pm, please contact the on-call GI fellow for any urgent issues via the Hospital Call

## 2024-07-18 NOTE — AIRWAY PLACEMENT NOTE ADULT - AIRWAY COMMENTS:
Pt arrived from interventional radiology intubated. ETT 7.5/24 at [facility].
Intubated at Bedside by MICU MD.

## 2024-07-18 NOTE — PROGRESS NOTE ADULT - ASSESSMENT
77 yo male with PMH of CAD s/p PCI x3 with most recent stent 6/12/24 on Plavix, chronic Afib on eliquis, orthostatic hypotension on midodrine, PAD, 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 Patient states since discharge on this past Saturday, he continued to feel ongoing generalized weakness and dizziness with positional changes despite compliance with home midodrine 15mg TID and holding torsemide as instructed as of day PTA . Admits to poor PO intake of fluids/solute due to ongoing issues with poor appetite and nausea with meals which is not new. In the ED, vitals notable for SBP 92-11s. Labs notable for elevated BUN/Cr 31/1.59 (from 30/1.18 on day of discharge 6/29/24). CXR with clear lungs. Abd US with innumerable intrahepatic echogenic masses consistent with known metastatic neuroendocrine tumor. Admitted to medicine for symptomatic hypotension and RAZIA.      1- CKD III    2-  hypotension   3- hx chf   4- pericarditis   5- C Diff   6- acidosis       RAZIA in setting of hypotension, anemia -> GIB, hemorrhagic shock leading to oliguric ATN  received CT IV contrast, and emergently went to IR for mesenteric embolization 7/9  now creatinine is improving towards baseline   non oliguric   bp support needed with  midodrine 30 mg tid as well as northera   amio per icu team  IV alb    vanco 125 mg q 6 enteral   acidosis for now cont    sodium bicarb 1300 mg tid   strict I/O  monitor creatinine closely

## 2024-07-18 NOTE — PROGRESS NOTE ADULT - SUBJECTIVE AND OBJECTIVE BOX
Interval Events:   - Patient began having melena early this morning.   - Hgb fell from 9.1 to 7.1 in 9 hours and plt count 77.  - Ordered for 2 units pRBC  - Pt in mild distress on 3 L NC on evaluation    ROS:   12 point review of systems performed and negative except otherwise noted in HPI.    Hospital Medications:  acetaminophen     Tablet .. 650 milliGRAM(s) Oral every 6 hours PRN  aMIOdarone    Tablet 400 milliGRAM(s) Oral every 8 hours  aMIOdarone    Tablet   Oral   chlorhexidine 0.12% Liquid 15 milliLiter(s) Oral Mucosa every 12 hours  chlorhexidine 2% Cloths 1 Application(s) Topical <User Schedule>  dexMEDEtomidine Infusion 0.1 MICROgram(s)/kG/Hr IV Continuous <Continuous>  doxazosin 2 milliGRAM(s) Oral at bedtime  droxidopa 200 milliGRAM(s) Oral every 8 hours  folic acid Injectable 1 milliGRAM(s) IV Push daily  insulin lispro (ADMELOG) corrective regimen sliding scale   SubCutaneous every 6 hours  meropenem  IVPB 1000 milliGRAM(s) IV Intermittent once  meropenem  IVPB      meropenem  IVPB 1000 milliGRAM(s) IV Intermittent every 8 hours  midodrine 30 milliGRAM(s) Oral every 8 hours  mupirocin 2% Nasal 1 Application(s) Both Nostrils two times a day  pantoprazole  Injectable 40 milliGRAM(s) IV Push every 12 hours  phenylephrine    Infusion 0.25 MICROgram(s)/kG/Min IV Continuous <Continuous>  propofol Infusion 20 MICROgram(s)/kG/Min IV Continuous <Continuous>  sodium bicarbonate 1300 milliGRAM(s) Oral three times a day  thiamine Injectable 100 milliGRAM(s) IV Push daily  vancomycin    Solution 125 milliGRAM(s) Oral every 6 hours  vasopressin Infusion 0.04 Unit(s)/Min IV Continuous <Continuous>      PHYSICAL EXAM:   Vital Signs:  Vital Signs Last 24 Hrs  T(C): 37.1 (2024 12:00), Max: 37.1 (2024 20:00)  T(F): 98.8 (2024 12:00), Max: 98.8 (2024 20:00)  HR: 104 (2024 14:30) (101 - 163)  BP: 117/67 (2024 14:30) (49/25 - 128/71)  BP(mean): 84 (2024 14:30) (31 - 96)  RR: 29 (2024 14:30) (20 - 38)  SpO2: 99% (2024 14:30) (92% - 100%)    Parameters below as of 2024 08:00  Patient On (Oxygen Delivery Method): nasal cannula  O2 Flow (L/min): 4    Daily     Daily Weight in k.1 (2024 00:00)    GENERAL: uncomfortable appearing  NEURO: alert  HEENT: NCAT, no conjunctival pallor appreciated  CHEST: 3 L NC, no respiratory distress, no accessory muscle use  CARDIAC: tachycardiac, irregular  ABDOMEN: firm, diffusely tender to palpation  EXTREMITIES: warm, well perfused  SKIN: no lesions noted    LABS: reviewed                        7.1    14.23 )-----------( 77       ( 2024 08:35 )             22.0     07-18    143  |  114<H>  |  55<H>  ----------------------------<  185<H>  3.7   |  16<L>  |  1.30    Ca    7.2<L>      2024 00:21  Phos  2.6     18  Mg     1.5     18    TPro  4.1<L>  /  Alb  2.0<L>  /  TBili  0.9  /  DBili  x   /  AST  82<H>  /  ALT  54<H>  /  AlkPhos  383<H>  18    LIVER FUNCTIONS - ( 2024 00:21 )  Alb: 2.0 g/dL / Pro: 4.1 g/dL / ALK PHOS: 383 U/L / ALT: 54 U/L / AST: 82 U/L / GGT: x             Interval Diagnostic Studies: see sunrise for full report

## 2024-07-18 NOTE — PROCEDURE NOTE - SUPERVISORY STATEMENT
No immediate complications. Central access for hemodynamic instability on multiple vasopressors
worsening shock and GI bleed requiring urgent endoscopy. family notified prior to intubation and in agreement to proceed. patient critically ill. no desaturations during intubation. SBP maintained > 100 during entire procedure.

## 2024-07-18 NOTE — PROGRESS NOTE ADULT - SUBJECTIVE AND OBJECTIVE BOX
[facility]   [phone]  RENAL FOLLOW UP NOTE  --------------------------------------------------------------------------------  Chief Complaint:    24 hour events/subjective:    seen earlier   responsive     PAST HISTORY  --------------------------------------------------------------------------------  No significant changes to PMH, PSH, FHx, SHx, unless otherwise noted    ALLERGIES & MEDICATIONS  --------------------------------------------------------------------------------  Allergies    No Known Allergies    Intolerances      Standing Inpatient Medications  aMIOdarone    Tablet   Oral   aMIOdarone    Tablet 400 milliGRAM(s) Oral every 8 hours  chlorhexidine 0.12% Liquid 15 milliLiter(s) Oral Mucosa every 12 hours  chlorhexidine 2% Cloths 1 Application(s) Topical <User Schedule>  dexMEDEtomidine Infusion 0.1 MICROgram(s)/kG/Hr IV Continuous <Continuous>  doxazosin 2 milliGRAM(s) Oral at bedtime  droxidopa 200 milliGRAM(s) Oral every 8 hours  folic acid Injectable 1 milliGRAM(s) IV Push daily  insulin lispro (ADMELOG) corrective regimen sliding scale   SubCutaneous every 6 hours  meropenem  IVPB 1000 milliGRAM(s) IV Intermittent every 8 hours  meropenem  IVPB      midodrine 30 milliGRAM(s) Oral every 8 hours  mupirocin 2% Nasal 1 Application(s) Both Nostrils two times a day  pantoprazole  Injectable 40 milliGRAM(s) IV Push every 12 hours  phenylephrine    Infusion 0.25 MICROgram(s)/kG/Min IV Continuous <Continuous>  propofol Infusion 20 MICROgram(s)/kG/Min IV Continuous <Continuous>  sodium bicarbonate 1300 milliGRAM(s) Oral three times a day  thiamine Injectable 100 milliGRAM(s) IV Push daily  vancomycin    Solution 125 milliGRAM(s) Oral every 6 hours  vasopressin Infusion 0.04 Unit(s)/Min IV Continuous <Continuous>    PRN Inpatient Medications  acetaminophen     Tablet .. 650 milliGRAM(s) Oral every 6 hours PRN      REVIEW OF SYSTEMS  --------------------------------------------------------------------------------    Gen: denies chills,  CVS: denies chest pain/palpitations  Resp: denies SOB/Cough  GI: Denies N/V/Abd pain  : Denies dysuria    VITALS/PHYSICAL EXAM  --------------------------------------------------------------------------------  T(C): 37.1 (07-18-24 @ 16:00), Max: 37.1 (07-17-24 @ 20:00)  HR: 96 (07-18-24 @ 18:45) (96 - 163)  BP: 101/60 (07-18-24 @ 18:45) (49/25 - 128/71)  RR: 25 (07-18-24 @ 18:45) (20 - 38)  SpO2: 95% (07-18-24 @ 18:45) (92% - 100%)  Wt(kg): --        07-17-24 @ 07:01  -  07-18-24 @ 07:00  --------------------------------------------------------  IN: 2721.4 mL / OUT: 800 mL / NET: 1921.4 mL    07-18-24 @ 07:01  -  07-18-24 @ 19:39  --------------------------------------------------------  IN: 2679.8 mL / OUT: 0 mL / NET: 2679.8 mL      Physical Exam:  	      Gen:  on O2  	Pulm: decrease bs  no rales  +  ronchi  -  wheezing  	CV: No JVD. RRR, S1S2; no rub  	Abd: +BS, soft /nondistended  	: No bladder distention   	UE: Warm, no cyanosis  no clubbing,  no edema  	LE: Warm, no cyanosis  no clubbing,  4+  edema  	Neuro:  alert     LABS/STUDIES  --------------------------------------------------------------------------------              7.7    16.38 >-----------<  84       [07-18-24 @ 17:11]              23.9     143  |  114  |  55  ----------------------------<  185      [07-18-24 @ 00:21]  3.7   |  16  |  1.30        Ca     7.2     [07-18-24 @ 00:21]      Mg     1.5     [07-18-24 @ 00:21]      Phos  2.6     [07-18-24 @ 00:21]    TPro  4.1  /  Alb  2.0  /  TBili  0.9  /  DBili  x   /  AST  82  /  ALT  54  /  AlkPhos  383  [07-18-24 @ 00:21]    PT/INR: PT 12.0 , INR 1.09       [07-18-24 @ 00:21]  PTT: 31.3       [07-18-24 @ 00:21]          [07-18-24 @ 00:21]    Creatinine Trend:  SCr 1.30 [07-18 @ 00:21]  SCr 1.40 [07-17 @ 11:54]  SCr 1.51 [07-17 @ 00:42]  SCr 1.53 [07-16 @ 12:22]  SCr 1.66 [07-16 @ 02:16]        TSH 2.47      [07-04-24 @ 12:31]

## 2024-07-18 NOTE — CHART NOTE - NSCHARTNOTEFT_GEN_A_CORE
: Sravan Ruvalcaba, PGY-1    INDICATION:    PROCEDURE:  [X] LIMITED ECHO  [X] LIMITED CHEST  [] LIMITED RETROPERITONEAL  [] LIMITED ABDOMINAL  [] LIMITED DVT  [] NEEDLE GUIDANCE VASCULAR  [] NEEDLE GUIDANCE THORACENTESIS  [] NEEDLE GUIDANCE PARACENTESIS  [] NEEDLE GUIDANCE PERICARDIOCENTESIS  [] OTHER    FINDINGS:  Heart:  LV > RV. Hyperdynamic LV, No pericardial effusion. IVC indeterminant.  Lungs: Ascites and consolidations noted b/l. A line predominance. Lung sliding b/l. No pleural effusion.      INTERPRETATION:  Consolidations noted b/l lungs.  Hyperdynamic left ventricle.    Images uploaded on [employer] : Sravan Ruvalcaba, PGY-1    INDICATION: Shock    PROCEDURE:  [X] LIMITED ECHO  [X] LIMITED CHEST  [] LIMITED RETROPERITONEAL  [] LIMITED ABDOMINAL  [] LIMITED DVT  [] NEEDLE GUIDANCE VASCULAR  [] NEEDLE GUIDANCE THORACENTESIS  [] NEEDLE GUIDANCE PARACENTESIS  [] NEEDLE GUIDANCE PERICARDIOCENTESIS  [] OTHER    FINDINGS:  Heart:  LV > RV. Hyperdynamic LV, No pericardial effusion. IVC small  Lungs: Ascites and basilar consolidations noted b/l with small asssociated pleural effusion. A line predominance anterior. Lung sliding b/l.       INTERPRETATION:  Basilar consolidations noted b/l lungs.  Hyperdynamic left ventricle with small and collapsible IVC    Images uploaded on [employer]    Attending Attestation:  Agree with above. I was present for the entire procedure and have reviewed all images.

## 2024-07-18 NOTE — PROGRESS NOTE ADULT - SUBJECTIVE AND OBJECTIVE BOX
Patient is a 78y old  Male who presents with a chief complaint of hypotension (18 Jul 2024 06:57)    Patient seen and examined at bedside, in MICU.   MEDICATIONS  (STANDING):  aMIOdarone    Tablet 400 milliGRAM(s) Oral every 8 hours  aMIOdarone    Tablet   Oral   chlorhexidine 0.12% Liquid 15 milliLiter(s) Oral Mucosa every 12 hours  chlorhexidine 2% Cloths 1 Application(s) Topical <User Schedule>  dexMEDEtomidine Infusion 0.1 MICROgram(s)/kG/Hr (2.47 mL/Hr) IV Continuous <Continuous>  doxazosin 2 milliGRAM(s) Oral at bedtime  droxidopa 200 milliGRAM(s) Oral every 8 hours  folic acid Injectable 1 milliGRAM(s) IV Push daily  insulin lispro (ADMELOG) corrective regimen sliding scale   SubCutaneous every 6 hours  midodrine 30 milliGRAM(s) Oral every 8 hours  mupirocin 2% Nasal 1 Application(s) Both Nostrils two times a day  pantoprazole  Injectable 40 milliGRAM(s) IV Push every 12 hours  phenylephrine    Infusion 0.25 MICROgram(s)/kG/Min (9.25 mL/Hr) IV Continuous <Continuous>  propofol Infusion 20 MICROgram(s)/kG/Min (11.8 mL/Hr) IV Continuous <Continuous>  sodium bicarbonate 1300 milliGRAM(s) Oral three times a day  thiamine Injectable 100 milliGRAM(s) IV Push daily  vancomycin    Solution 125 milliGRAM(s) Oral every 6 hours  vasopressin Infusion 0.04 Unit(s)/Min (6 mL/Hr) IV Continuous <Continuous>    MEDICATIONS  (PRN):  acetaminophen     Tablet .. 650 milliGRAM(s) Oral every 6 hours PRN Temp greater or equal to 38C (100.4F)    Vital Signs Last 24 Hrs  T(C): 37.1 (18 Jul 2024 12:00), Max: 37.2 (17 Jul 2024 15:00)  T(F): 98.8 (18 Jul 2024 12:00), Max: 99 (17 Jul 2024 15:00)  HR: 109 (18 Jul 2024 14:00) (109 - 163)  BP: 110/77 (18 Jul 2024 14:00) (49/25 - 128/71)  BP(mean): 88 (18 Jul 2024 14:00) (31 - 90)  RR: 26 (18 Jul 2024 14:00) (20 - 38)  SpO2: 100% (18 Jul 2024 14:00) (92% - 100%)    Parameters below as of 18 Jul 2024 08:00  Patient On (Oxygen Delivery Method): nasal cannula  O2 Flow (L/min): 4    PE  NAD  Intubated  Anicteric, MMM  No c/c/e  No rash grossly                        7.1    14.23 )-----------( 77       ( 18 Jul 2024 08:35 )             22.0     07-18    143  |  114<H>  |  55<H>  ----------------------------<  185<H>  3.7   |  16<L>  |  1.30    Ca    7.2<L>      18 Jul 2024 00:21  Phos  2.6     07-18  Mg     1.5     07-18    TPro  4.1<L>  /  Alb  2.0<L>  /  TBili  0.9  /  DBili  x   /  AST  82<H>  /  ALT  54<H>  /  AlkPhos  383<H>  07-18

## 2024-07-18 NOTE — PROGRESS NOTE ADULT - ATTENDING COMMENTS
patient known to our service, reconsulted this AM  repeated melena and sig drop in hgb  concern for repeat bleeding, most likely at site of duodenal ulcers  given stability for 1 week prior to this, repeat endoscopy indicted prior to other intervention options  reasonable to discuss with IR/surgery given recurrent bleed, but would start with EGD  Plan for urgent EGD today after resuscication

## 2024-07-18 NOTE — PROGRESS NOTE ADULT - CRITICAL CARE ATTENDING COMMENT
Patient critically ill requiring frequent bedside visits.    Patient is a 78M extensive history including CAD s/p stent (most recent 6/12/24), Afib on AC, orthostatic hypotension on midodrine, and pancreatic neuroendocrine tumor who presents with hypotension due to acute GI bleed. The patient has had worsening shock state and multiorgan failure.   --- Patient has had a deterioration over the last 24 hours and started to have melena again with worsening shock and anemia.    #Neuro - mental status improved overall and patient following commands  #Cardiovascular - shock state due to GIB again worsening - POCUS and increase vasopressors to maintain MAP > 65  - PRBC transfusion. Will give Platelets as well as patient has been on Plavix  - RVR while on Levo again - given Amio pushes and now on Amio PO load. Tachycardia likely worsening in setting of bleeding. If RVR worsens may need to consider cardioversion  - NURIA recently was off DAPT for few days in setting of bleeding but Plavix restarted 7/14. Given recurrent bleeding will stop Plavix. At this point patient has now had recurrent bleeding in setting of being on Plavix  --- On 6/12/24 patient had PCI with NURIA to large RPL and patent LAD stent  - Orthostatic hypotension - Midodrine and Droxidopa  - Cardiology evaluation noted  - D/C statin for short time and restart when LFTs downtrend again and clinical status stabilizes  #Pulmonary - acute hypoxemic respiratory failure in setting of GI bleed  - Maintain O2 sat > 90% - saturating well on minimal NC however will need to be intubated again for shock and GI bleed  - Extubated on 7/15 but will need to be intubated again today for GI bleed  - CXR and ABG post intubation  #Gastro - massive GIB s/p EGD x 2 and empiric GDA embolization on 7/9  - 2nd EGD done 7/12 noted duodenal ulcers and clot but no active bleeding  - GI follow-up for repeat EGD given recurrent melena and acute blood loss anemia. May need surgery evaluation  - C. diff positive and started on PO Vanco - monitor diarrhea  - Continue PPI  - Transaminitis - RUQ sono noted  - Oropharyngeal dysphagia - NGT in place may need to be removed for EGD  #Nephro - acute renal failure due to ATN now improved but still with signs of peripheral edema. Hold off diuretics given shock  - Monitor Cr and urine output which are both improving today  #Infectious Disease - Continue antibiotics in setting of shock state and fevers  - Monitor temperature curve and follow-up cultures - ETT culture with Klebsiella awaiting sensitivities  - C. diff positive - PO Vanco - diarrhea reportedly improved today  #Endo - monitor FS and adjust insulin as needed for goal FS < 180  #Hem/Onc - pancreatic neuroendocrine tumor  - Outpatient follow-up at [facility] - per Onc seeing patient at [facility] there does appear to be progression of disease  #DVT proph - SCD  #GOC - Full Code    Prognosis guarded.   I spoke with patient's daughter at bedside today. MICU team updated patient's wife about worsening clinical status and need for intubation and it was stated that this remains in line with GOC.

## 2024-07-18 NOTE — PROGRESS NOTE ADULT - SUBJECTIVE AND OBJECTIVE BOX
Jodie Okeefe MD PGY-1  ---------------------------------------------------------------------------------------------  Patient is a 78y old  Male who presents with a chief complaint of hypotension (16 Jul 2024 20:45)      HPI:  79 yo male with PMH of CAD s/p PCI x3 with most recent stent 6/12/24 on Plavix, chronic Afib on eliquis, orthostatic hypotension on midodrine, PAD, 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 Patient states since discharge on this past Saturday, he continued to feel ongoing generalized weakness and dizziness with positional changes despite compliance with home midodrine 15mg TID and holding torsemide as instructed. Admits to poor PO intake of fluids/solute due to ongoing issues with poor appetite and nausea with meals which is not new. Denies any fever, chills, chest pain, SOB, cough, abd pain, dysuria nor urinary frequency. Has chronic diarrhea that is unchanged from his baseline.     In the ED, vitals notable for SBP 92-11s. Labs notable for elevated BUN/Cr 31/1.59 (from 30/1.18 on day of discharge 6/29/24). CXR with clear lungs. Abd US with innumerable intrahepatic echogenic masses consistent with known metastatic neuroendocrine tumor. Admitted to medicine for symptomatic hypotension and RAZIA. (02 Jul 2024 18:34)      SUBJECTIVE / OVERNIGHT EVENTS:  Overnight, patient was in afib with RVR the whole night with -160s. Lopressor 5 helped for about an hour. IVC was variable and small. 500mL fluids given but did not significantly help. Patient had 11 BM overnight, including one large melanotic stool. Hemoglobin 10.1 to 9.1.    Other Data at Bedside:  Vitals:  - Temp ***, HR ***, SpO2 ***% ***, BP ***/*** (MAP ***), RR ***  Gtt: henny 0.5  O2: ***  Lines: ***  UOP: ***  MRSA swab negative, staph aureus positive  RUQ shows known liver mets  LE doppler is negative    MEDICATIONS  (STANDING):  aMIOdarone Infusion 0.5 mG/Min (16.7 mL/Hr) IV Continuous <Continuous>  aMIOdarone Infusion 1 mG/Min (33.3 mL/Hr) IV Continuous <Continuous>  atorvastatin 80 milliGRAM(s) Oral at bedtime  chlorhexidine 2% Cloths 1 Application(s) Topical <User Schedule>  clopidogrel Tablet 75 milliGRAM(s) Enteral Tube daily  doxazosin 2 milliGRAM(s) Oral at bedtime  droxidopa 200 milliGRAM(s) Oral every 8 hours  folic acid Injectable 1 milliGRAM(s) IV Push daily  glucagon  Injectable 1 milliGRAM(s) IntraMuscular once  insulin lispro (ADMELOG) corrective regimen sliding scale   SubCutaneous every 6 hours  magnesium sulfate  IVPB 1 Gram(s) IV Intermittent once  midodrine 30 milliGRAM(s) Oral every 8 hours  pantoprazole  Injectable 40 milliGRAM(s) IV Push every 12 hours  phenylephrine    Infusion 0.25 MICROgram(s)/kG/Min (9.25 mL/Hr) IV Continuous <Continuous>  piperacillin/tazobactam IVPB.. 3.375 Gram(s) IV Intermittent every 8 hours  potassium chloride   Powder 40 milliEquivalent(s) Oral once  sodium bicarbonate 650 milliGRAM(s) Oral three times a day  thiamine Injectable 100 milliGRAM(s) IV Push daily  vancomycin    Solution 125 milliGRAM(s) Oral every 6 hours  vasopressin Infusion 0.04 Unit(s)/Min (6 mL/Hr) IV Continuous <Continuous>    MEDICATIONS  (PRN):  acetaminophen     Tablet .. 650 milliGRAM(s) Oral every 6 hours PRN Temp greater or equal to 38C (100.4F)    Allergies    No Known Allergies    Intolerances    ICU Vital Signs Last 24 Hrs  T(F): 99.7 (17 Jul 2024 03:00), Max: 101.7 (16 Jul 2024 23:00)  HR: 115 (17 Jul 2024 03:30) (105 - 176)  BP: 128/68 (17 Jul 2024 03:30) (87/54 - 184/116)  BP(mean): 92 (17 Jul 2024 03:30) (64 - 140)  ABP: --  ABP(mean): --  RR: 30 (17 Jul 2024 03:30) (21 - 46)  SpO2: 100% (17 Jul 2024 03:30) (92% - 100%)    Physical Exam:   GENERAL: NAD, lying in bed comfortably, headrest elevated, NG tube in place, no nasal cannula  HEAD:  Atraumatic, Normocephalic  EYES: EOMI, PERRLA, conjunctiva and sclera clear  NECK:  No JVD  CHEST/LUNG: Coarse breath sounds bilaterally, no decreased breath sounds  HEART: Tachycardic, irregular rate & rhythm, no murmurs  ABDOMEN: Bowel sounds present; Soft, Nontender  EXTREMITIES: Bilateral LE edema, pitting left side  NERVOUS SYSTEM:  Alert & Oriented X3, speech clear. No apparent deficits  SKIN: No rashes or lesions    I&O's Summary    15 Jul 2024 07:01  -  16 Jul 2024 07:00  --------------------------------------------------------  IN: 1383.8 mL / OUT: 945 mL / NET: 438.8 mL    16 Jul 2024 07:01  -  17 Jul 2024 05:00  --------------------------------------------------------  IN: 4820 mL / OUT: 940 mL / NET: 3880 mL      LABS:                        10.3   11.64 )-----------( 86       ( 17 Jul 2024 00:41 )             32.1     07-17    143  |  111<H>  |  63<H>  ----------------------------<  301<H>  3.9   |  14<L>  |  1.51<H>    Ca    7.3<L>      17 Jul 2024 00:42  Phos  3.5     07-17  Mg     1.7     07-17    TPro  4.7<L>  /  Alb  2.3<L>  /  TBili  1.3<H>  /  DBili  x   /  AST  129<H>  /  ALT  72<H>  /  AlkPhos  484<H>  07-17    PT/INR - ( 17 Jul 2024 00:42 )   PT: 12.4 sec;   INR: 1.19 ratio         PTT - ( 17 Jul 2024 00:42 )  PTT:30.7 sec  ABG - ( 15 Jul 2024 11:47 )  pH, Arterial: 7.39  pH, Blood: x     /  pCO2: 27    /  pO2: 114   / HCO3: 16    / Base Excess: -7.1  /  SaO2: 99.3        Venous: 07-17-24 @ 00:38 FiO2: 21 Oxygen Sat% 62.7  Venous: 07-16-24 @ 12:10 FiO2: 21 Oxygen Sat% 67.7    POCT Blood Glucose.: 317 mg/dL (17 Jul 2024 01:36)  POCT Blood Glucose.: 185 mg/dL (16 Jul 2024 17:07)  POCT Blood Glucose.: 154 mg/dL (16 Jul 2024 12:12)  POCT Blood Glucose.: 146 mg/dL (16 Jul 2024 06:28)   Jodie Okeefe MD PGY-1  ---------------------------------------------------------------------------------------------  Patient is a 78y old  Male who presents with a chief complaint of hypotension (16 Jul 2024 20:45)      HPI:  77 yo male with PMH of CAD s/p PCI x3 with most recent stent 6/12/24 on Plavix, chronic Afib on eliquis, orthostatic hypotension on midodrine, PAD, 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 Patient states since discharge on this past Saturday, he continued to feel ongoing generalized weakness and dizziness with positional changes despite compliance with home midodrine 15mg TID and holding torsemide as instructed. Admits to poor PO intake of fluids/solute due to ongoing issues with poor appetite and nausea with meals which is not new. Denies any fever, chills, chest pain, SOB, cough, abd pain, dysuria nor urinary frequency. Has chronic diarrhea that is unchanged from his baseline.     In the ED, vitals notable for SBP 92-11s. Labs notable for elevated BUN/Cr 31/1.59 (from 30/1.18 on day of discharge 6/29/24). CXR with clear lungs. Abd US with innumerable intrahepatic echogenic masses consistent with known metastatic neuroendocrine tumor. Admitted to medicine for symptomatic hypotension and RAZIA. (02 Jul 2024 18:34)      SUBJECTIVE / OVERNIGHT EVENTS:  Overnight, patient was in afib with RVR the whole night with -160s. Lopressor 5 helped for about an hour. IVC was variable and small. 500mL fluids given but did not significantly help. Patient had 11 BM overnight, including one large melanotic stool. Hemoglobin 10.1 to 9.1.    Patient is alert and oriented today. His speech has improved without frequent coughing. He denies any pain.    Other Data at Bedside:  Vitals:  - Temp 98.5 at 4am, , SpO2 100% RA, /65 (MAP 74), RR 20  Gtt: henny 0.3  Lines: central line  No tavarez  MRSA swab negative, staph aureus positive  RUQ shows known liver mets  LE doppler is negative    MEDICATIONS  (STANDING):  aMIOdarone Infusion 0.5 mG/Min (16.7 mL/Hr) IV Continuous <Continuous>  aMIOdarone Infusion 1 mG/Min (33.3 mL/Hr) IV Continuous <Continuous>  atorvastatin 80 milliGRAM(s) Oral at bedtime  chlorhexidine 2% Cloths 1 Application(s) Topical <User Schedule>  clopidogrel Tablet 75 milliGRAM(s) Enteral Tube daily  doxazosin 2 milliGRAM(s) Oral at bedtime  droxidopa 200 milliGRAM(s) Oral every 8 hours  folic acid Injectable 1 milliGRAM(s) IV Push daily  glucagon  Injectable 1 milliGRAM(s) IntraMuscular once  insulin lispro (ADMELOG) corrective regimen sliding scale   SubCutaneous every 6 hours  magnesium sulfate  IVPB 1 Gram(s) IV Intermittent once  midodrine 30 milliGRAM(s) Oral every 8 hours  pantoprazole  Injectable 40 milliGRAM(s) IV Push every 12 hours  phenylephrine    Infusion 0.25 MICROgram(s)/kG/Min (9.25 mL/Hr) IV Continuous <Continuous>  piperacillin/tazobactam IVPB.. 3.375 Gram(s) IV Intermittent every 8 hours  potassium chloride   Powder 40 milliEquivalent(s) Oral once  sodium bicarbonate 650 milliGRAM(s) Oral three times a day  thiamine Injectable 100 milliGRAM(s) IV Push daily  vancomycin    Solution 125 milliGRAM(s) Oral every 6 hours  vasopressin Infusion 0.04 Unit(s)/Min (6 mL/Hr) IV Continuous <Continuous>    MEDICATIONS  (PRN):  acetaminophen     Tablet .. 650 milliGRAM(s) Oral every 6 hours PRN Temp greater or equal to 38C (100.4F)    Allergies    No Known Allergies    Intolerances    ICU Vital Signs Last 24 Hrs  T(F): 99.7 (17 Jul 2024 03:00), Max: 101.7 (16 Jul 2024 23:00)  HR: 115 (17 Jul 2024 03:30) (105 - 176)  BP: 128/68 (17 Jul 2024 03:30) (87/54 - 184/116)  BP(mean): 92 (17 Jul 2024 03:30) (64 - 140)  ABP: --  ABP(mean): --  RR: 30 (17 Jul 2024 03:30) (21 - 46)  SpO2: 100% (17 Jul 2024 03:30) (92% - 100%)    Physical Exam:   GENERAL: NAD, lying in bed comfortably, headrest elevated, NG tube in place, no nasal cannula  HEAD:  Atraumatic, Normocephalic  EYES: EOMI, PERRLA, conjunctiva and sclera clear  NECK:  No JVD  CHEST/LUNG: Coarse breath sounds bilaterally, no decreased breath sounds  HEART: Tachycardic, irregular rate & rhythm, no murmurs  ABDOMEN: Bowel sounds present; Soft, Nontender  EXTREMITIES: Bilateral LE edema, pitting left side  NERVOUS SYSTEM:  Alert & Oriented X3, speech clear. No apparent deficits  SKIN: No rashes or lesions    I&O's Summary    15 Jul 2024 07:01  -  16 Jul 2024 07:00  --------------------------------------------------------  IN: 1383.8 mL / OUT: 945 mL / NET: 438.8 mL    16 Jul 2024 07:01  -  17 Jul 2024 05:00  --------------------------------------------------------  IN: 4820 mL / OUT: 940 mL / NET: 3880 mL      LABS:                        10.3   11.64 )-----------( 86       ( 17 Jul 2024 00:41 )             32.1     07-17    143  |  111<H>  |  63<H>  ----------------------------<  301<H>  3.9   |  14<L>  |  1.51<H>    Ca    7.3<L>      17 Jul 2024 00:42  Phos  3.5     07-17  Mg     1.7     07-17    TPro  4.7<L>  /  Alb  2.3<L>  /  TBili  1.3<H>  /  DBili  x   /  AST  129<H>  /  ALT  72<H>  /  AlkPhos  484<H>  07-17    PT/INR - ( 17 Jul 2024 00:42 )   PT: 12.4 sec;   INR: 1.19 ratio         PTT - ( 17 Jul 2024 00:42 )  PTT:30.7 sec  ABG - ( 15 Jul 2024 11:47 )  pH, Arterial: 7.39  pH, Blood: x     /  pCO2: 27    /  pO2: 114   / HCO3: 16    / Base Excess: -7.1  /  SaO2: 99.3        Venous: 07-17-24 @ 00:38 FiO2: 21 Oxygen Sat% 62.7  Venous: 07-16-24 @ 12:10 FiO2: 21 Oxygen Sat% 67.7    POCT Blood Glucose.: 317 mg/dL (17 Jul 2024 01:36)  POCT Blood Glucose.: 185 mg/dL (16 Jul 2024 17:07)  POCT Blood Glucose.: 154 mg/dL (16 Jul 2024 12:12)  POCT Blood Glucose.: 146 mg/dL (16 Jul 2024 06:28)   Jodie Okeefe MD PGY-1  ---------------------------------------------------------------------------------------------  Patient is a 78y old  Male who presents with a chief complaint of hypotension (16 Jul 2024 20:45)      HPI:  77 yo male with PMH of CAD s/p PCI x3 with most recent stent 6/12/24 on Plavix, chronic Afib on eliquis, orthostatic hypotension on midodrine, PAD, 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 Patient states since discharge on this past Saturday, he continued to feel ongoing generalized weakness and dizziness with positional changes despite compliance with home midodrine 15mg TID and holding torsemide as instructed. Admits to poor PO intake of fluids/solute due to ongoing issues with poor appetite and nausea with meals which is not new. Denies any fever, chills, chest pain, SOB, cough, abd pain, dysuria nor urinary frequency. Has chronic diarrhea that is unchanged from his baseline.     In the ED, vitals notable for SBP 92-11s. Labs notable for elevated BUN/Cr 31/1.59 (from 30/1.18 on day of discharge 6/29/24). CXR with clear lungs. Abd US with innumerable intrahepatic echogenic masses consistent with known metastatic neuroendocrine tumor. Admitted to medicine for symptomatic hypotension and RAZIA. (02 Jul 2024 18:34)      SUBJECTIVE / OVERNIGHT EVENTS:  Overnight, patient was in afib with RVR the whole night with -160s. Lopressor 5 helped for about an hour. IVC was variable and small. 500cc albumin given but did not significantly help. Patient had 11 BM overnight, including one large melanotic stool. Hemoglobin 10.1 to 9.1.    Patient is alert and oriented today. His speech has improved without frequent coughing. He denies any pain.    Other Data at Bedside:  Vitals:  - Temp 98.5 at 4am, , SpO2 100% RA, /65 (MAP 74), RR 20  Gtt: henny 0.3  Lines: central line  No tavarez  MRSA swab negative, staph aureus positive  RUQ shows known liver mets  LE doppler is negative    MEDICATIONS  (STANDING):  aMIOdarone Infusion 0.5 mG/Min (16.7 mL/Hr) IV Continuous <Continuous>  aMIOdarone Infusion 1 mG/Min (33.3 mL/Hr) IV Continuous <Continuous>  atorvastatin 80 milliGRAM(s) Oral at bedtime  chlorhexidine 2% Cloths 1 Application(s) Topical <User Schedule>  clopidogrel Tablet 75 milliGRAM(s) Enteral Tube daily  doxazosin 2 milliGRAM(s) Oral at bedtime  droxidopa 200 milliGRAM(s) Oral every 8 hours  folic acid Injectable 1 milliGRAM(s) IV Push daily  glucagon  Injectable 1 milliGRAM(s) IntraMuscular once  insulin lispro (ADMELOG) corrective regimen sliding scale   SubCutaneous every 6 hours  magnesium sulfate  IVPB 1 Gram(s) IV Intermittent once  midodrine 30 milliGRAM(s) Oral every 8 hours  pantoprazole  Injectable 40 milliGRAM(s) IV Push every 12 hours  phenylephrine    Infusion 0.25 MICROgram(s)/kG/Min (9.25 mL/Hr) IV Continuous <Continuous>  piperacillin/tazobactam IVPB.. 3.375 Gram(s) IV Intermittent every 8 hours  potassium chloride   Powder 40 milliEquivalent(s) Oral once  sodium bicarbonate 650 milliGRAM(s) Oral three times a day  thiamine Injectable 100 milliGRAM(s) IV Push daily  vancomycin    Solution 125 milliGRAM(s) Oral every 6 hours  vasopressin Infusion 0.04 Unit(s)/Min (6 mL/Hr) IV Continuous <Continuous>    MEDICATIONS  (PRN):  acetaminophen     Tablet .. 650 milliGRAM(s) Oral every 6 hours PRN Temp greater or equal to 38C (100.4F)    Allergies    No Known Allergies    Intolerances    ICU Vital Signs Last 24 Hrs  T(F): 99.7 (17 Jul 2024 03:00), Max: 101.7 (16 Jul 2024 23:00)  HR: 115 (17 Jul 2024 03:30) (105 - 176)  BP: 128/68 (17 Jul 2024 03:30) (87/54 - 184/116)  BP(mean): 92 (17 Jul 2024 03:30) (64 - 140)  ABP: --  ABP(mean): --  RR: 30 (17 Jul 2024 03:30) (21 - 46)  SpO2: 100% (17 Jul 2024 03:30) (92% - 100%)    Physical Exam:   GENERAL: NAD, lying in bed comfortably, headrest elevated, NG tube in place, no nasal cannula  HEAD:  Atraumatic, Normocephalic  EYES: EOMI, PERRLA, conjunctiva and sclera clear  NECK:  No JVD  CHEST/LUNG: Coarse breath sounds bilaterally, no decreased breath sounds  HEART: Tachycardic, irregular rate & rhythm, no murmurs  ABDOMEN: Bowel sounds present; Soft, Nontender  EXTREMITIES: Bilateral LE edema, pitting left side  NERVOUS SYSTEM:  Alert & Oriented X3, speech clear. No apparent deficits  SKIN: No rashes or lesions    I&O's Summary    15 Jul 2024 07:01  -  16 Jul 2024 07:00  --------------------------------------------------------  IN: 1383.8 mL / OUT: 945 mL / NET: 438.8 mL    16 Jul 2024 07:01  -  17 Jul 2024 05:00  --------------------------------------------------------  IN: 4820 mL / OUT: 940 mL / NET: 3880 mL      LABS:                        10.3   11.64 )-----------( 86       ( 17 Jul 2024 00:41 )             32.1     07-17    143  |  111<H>  |  63<H>  ----------------------------<  301<H>  3.9   |  14<L>  |  1.51<H>    Ca    7.3<L>      17 Jul 2024 00:42  Phos  3.5     07-17  Mg     1.7     07-17    TPro  4.7<L>  /  Alb  2.3<L>  /  TBili  1.3<H>  /  DBili  x   /  AST  129<H>  /  ALT  72<H>  /  AlkPhos  484<H>  07-17    PT/INR - ( 17 Jul 2024 00:42 )   PT: 12.4 sec;   INR: 1.19 ratio         PTT - ( 17 Jul 2024 00:42 )  PTT:30.7 sec  ABG - ( 15 Jul 2024 11:47 )  pH, Arterial: 7.39  pH, Blood: x     /  pCO2: 27    /  pO2: 114   / HCO3: 16    / Base Excess: -7.1  /  SaO2: 99.3        Venous: 07-17-24 @ 00:38 FiO2: 21 Oxygen Sat% 62.7  Venous: 07-16-24 @ 12:10 FiO2: 21 Oxygen Sat% 67.7    POCT Blood Glucose.: 317 mg/dL (17 Jul 2024 01:36)  POCT Blood Glucose.: 185 mg/dL (16 Jul 2024 17:07)  POCT Blood Glucose.: 154 mg/dL (16 Jul 2024 12:12)  POCT Blood Glucose.: 146 mg/dL (16 Jul 2024 06:28)   Jodie Okeefe MD PGY-1  ---------------------------------------------------------------------------------------------  Patient is a 78y old  Male who presents with a chief complaint of hypotension (16 Jul 2024 20:45)      HPI:  79 yo male with PMH of CAD s/p PCI x3 with most recent stent 6/12/24 on Plavix, chronic Afib on eliquis, orthostatic hypotension on midodrine, PAD, 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 Patient states since discharge on this past Saturday, he continued to feel ongoing generalized weakness and dizziness with positional changes despite compliance with home midodrine 15mg TID and holding torsemide as instructed. Admits to poor PO intake of fluids/solute due to ongoing issues with poor appetite and nausea with meals which is not new. Denies any fever, chills, chest pain, SOB, cough, abd pain, dysuria nor urinary frequency. Has chronic diarrhea that is unchanged from his baseline.     In the ED, vitals notable for SBP 92-11s. Labs notable for elevated BUN/Cr 31/1.59 (from 30/1.18 on day of discharge 6/29/24). CXR with clear lungs. Abd US with innumerable intrahepatic echogenic masses consistent with known metastatic neuroendocrine tumor. Admitted to medicine for symptomatic hypotension and RAZIA. (02 Jul 2024 18:34)      SUBJECTIVE / OVERNIGHT EVENTS:  Overnight, patient was in afib with RVR the whole night with -160s. Lopressor 5 helped for about an hour. IVC was variable and small. 500cc albumin given but did not significantly help. Patient had 11 BM overnight, including one large melanotic stool. Hemoglobin 10.1 to 9.1.    Patient is alert and oriented today. His speech has improved without frequent coughing. He denies any pain.    Other Data at Bedside this morning:  Vitals:  - Temp 98.5 at 4am, , SpO2 100% RA, /65 (MAP 74), RR 20  Gtt: henny 0.3  Lines: central line  MRSA swab negative, staph aureus positive  RUQ shows known liver mets  LE doppler is negative    MEDICATIONS  (STANDING):  aMIOdarone Infusion 0.5 mG/Min (16.7 mL/Hr) IV Continuous <Continuous>  aMIOdarone Infusion 1 mG/Min (33.3 mL/Hr) IV Continuous <Continuous>  atorvastatin 80 milliGRAM(s) Oral at bedtime  chlorhexidine 2% Cloths 1 Application(s) Topical <User Schedule>  clopidogrel Tablet 75 milliGRAM(s) Enteral Tube daily  doxazosin 2 milliGRAM(s) Oral at bedtime  droxidopa 200 milliGRAM(s) Oral every 8 hours  folic acid Injectable 1 milliGRAM(s) IV Push daily  glucagon  Injectable 1 milliGRAM(s) IntraMuscular once  insulin lispro (ADMELOG) corrective regimen sliding scale   SubCutaneous every 6 hours  magnesium sulfate  IVPB 1 Gram(s) IV Intermittent once  midodrine 30 milliGRAM(s) Oral every 8 hours  pantoprazole  Injectable 40 milliGRAM(s) IV Push every 12 hours  phenylephrine    Infusion 0.25 MICROgram(s)/kG/Min (9.25 mL/Hr) IV Continuous <Continuous>  piperacillin/tazobactam IVPB.. 3.375 Gram(s) IV Intermittent every 8 hours  potassium chloride   Powder 40 milliEquivalent(s) Oral once  sodium bicarbonate 650 milliGRAM(s) Oral three times a day  thiamine Injectable 100 milliGRAM(s) IV Push daily  vancomycin    Solution 125 milliGRAM(s) Oral every 6 hours  vasopressin Infusion 0.04 Unit(s)/Min (6 mL/Hr) IV Continuous <Continuous>    MEDICATIONS  (PRN):  acetaminophen     Tablet .. 650 milliGRAM(s) Oral every 6 hours PRN Temp greater or equal to 38C (100.4F)    Allergies    No Known Allergies    Intolerances    ICU Vital Signs Last 24 Hrs  T(F): 99.7 (17 Jul 2024 03:00), Max: 101.7 (16 Jul 2024 23:00)  HR: 115 (17 Jul 2024 03:30) (105 - 176)  BP: 128/68 (17 Jul 2024 03:30) (87/54 - 184/116)  BP(mean): 92 (17 Jul 2024 03:30) (64 - 140)  ABP: --  ABP(mean): --  RR: 30 (17 Jul 2024 03:30) (21 - 46)  SpO2: 100% (17 Jul 2024 03:30) (92% - 100%)    Physical Exam:   GENERAL: NAD, lying in bed comfortably, headrest elevated, NG tube in place  HEAD:  Atraumatic, Normocephalic  EYES: EOMI, PERRLA, conjunctiva and sclera clear  NECK:  No JVD  CHEST/LUNG: Coarse breath sounds bilaterally, no decreased breath sounds  HEART: Tachycardic, irregular rate & rhythm, no murmurs  ABDOMEN: Bowel sounds present; Soft, Nontender  EXTREMITIES: Bilateral LE edema, pitting left side, slightly improved from yesterday  NERVOUS SYSTEM:  Alert & Oriented X3, speech clear and improved from yesterday    I&O's Summary    15 Jul 2024 07:01  -  16 Jul 2024 07:00  --------------------------------------------------------  IN: 1383.8 mL / OUT: 945 mL / NET: 438.8 mL    16 Jul 2024 07:01  -  17 Jul 2024 05:00  --------------------------------------------------------  IN: 4820 mL / OUT: 940 mL / NET: 3880 mL      LABS:                        10.3   11.64 )-----------( 86       ( 17 Jul 2024 00:41 )             32.1     07-17    143  |  111<H>  |  63<H>  ----------------------------<  301<H>  3.9   |  14<L>  |  1.51<H>    Ca    7.3<L>      17 Jul 2024 00:42  Phos  3.5     07-17  Mg     1.7     07-17    TPro  4.7<L>  /  Alb  2.3<L>  /  TBili  1.3<H>  /  DBili  x   /  AST  129<H>  /  ALT  72<H>  /  AlkPhos  484<H>  07-17    PT/INR - ( 17 Jul 2024 00:42 )   PT: 12.4 sec;   INR: 1.19 ratio         PTT - ( 17 Jul 2024 00:42 )  PTT:30.7 sec  ABG - ( 15 Jul 2024 11:47 )  pH, Arterial: 7.39  pH, Blood: x     /  pCO2: 27    /  pO2: 114   / HCO3: 16    / Base Excess: -7.1  /  SaO2: 99.3        Venous: 07-17-24 @ 00:38 FiO2: 21 Oxygen Sat% 62.7  Venous: 07-16-24 @ 12:10 FiO2: 21 Oxygen Sat% 67.7    POCT Blood Glucose.: 317 mg/dL (17 Jul 2024 01:36)  POCT Blood Glucose.: 185 mg/dL (16 Jul 2024 17:07)  POCT Blood Glucose.: 154 mg/dL (16 Jul 2024 12:12)  POCT Blood Glucose.: 146 mg/dL (16 Jul 2024 06:28)

## 2024-07-18 NOTE — PROGRESS NOTE ADULT - ASSESSMENT
Assessment	  Assessment: 79 yo male with metastatic neuroendocrine pancreatic cancer (tx on hold) and PMH of CAD s/p PCI x3 with most recent stent 6/12/24 on Plavix and eliquis , chronic Afib on eliquis, orthostatic hypotension on midodrine, PAD , recent admission for dx cath on 6/24/24 presented from PCP's office for hypotension despite taking AM midodrine dose on 7/2, admitted to medicine for symptomatic hypotension and RAZIA. Per chart, on 7/8 PM, pt was noted to have acute onset of melena x5 and coffee ground emesis x2-3. RRT was called on 7/9 AM for hypotension, hgb dropped from 9.2 to 7.4 (admission baseline 10-11), FOBT +, pt was started on PPI IV and transfused 1 unit of pRBC. GI was consulted and underwent EGD on 7/9 afternoon. MICU was consulted for uncontrolled bleeding during EGD. During EGD, pt became hypotensive and was given phenylephrine, had A-fib with RVR s/p amiodarone. Now s/p IR GDA embolization, admitted to MICU for further moniring i.s.o hemorrhagic shock 2/2 GI bleed. On repeat EGD, oozing but no active bleeding was noted. Hospital course c/b c.diff, and patient has been placed on vancomycin.    Plan:     NEUROLOGIC  # Baseline AOx3  Course:  - intubated 7/9  - weaned off propofol onto precedex  - extubated 7/15  Plan:  - fentanyl prn for pain control (last dose 7/12/24)    CARDIOVASCULAR  # hemorrhagic shock   Course:  - RRT called 7/9 for hypotension   - 2/2 to Upper GI bleeds, urgently took to EGD, found bleeding ulcer that was not well controlled, underwent IR empiric embolization (7/9)  - s/p 5 pRBC prior to MICU, and 4 pRBC, 2 FFP, 2 platelets in MICU  - Restarted plavix 7/13/24 for new stent (6/12/24) per GI  - on henny, maintain MAP > 65; took off vaso and precedex (7/13/24), he got tachy to 118. Added precedex back. vasopressin added 7/14  - per spouse, pt's baseline BP is a little bit over 100  - Albumin 5%  mL given 7/16  - Vasopressin stopped (7/17)  Findings:  - AM cortisol 17 (7/5), AM cortisol 37.1 (7/11)  - POCUS ECHO (7/16) showed no cardiogenic shock, no tamponade, low SV indeterminate IVC, irregular B lines but not concerning for pulmonary edema, and some subdiaphragmatic fluid  Plan:  - Transfuse as needed  - Continue henny, try to wean (goal MAP >65)   - Continue plavix 75 mg daily  - Continue midodrine 30mg q8  - Continue droxidopa 300mg q8   - Start IVF if not volume overloaded given recent stool patterns      #CAD s/p PCI x3, most recent stent 6/12/24, NURIA to pLAD (UNCHANGED)  Course:  - restarted plavix 7/13/24 for new stent (6/12/24) per GI   Plan:  - continue to hold statin because of elevated LFTs    #PAD  # A-fib on eliquis WORSENED  - s/p successful DCCV 10/31   - In RVR 7/16  - Cards recommended resuming home dose sotalol 40 mg PO (qTC wnl)  - s/p 1 dose sotalol but RVR persisted  - s/p 2 doses of amio overnight (7/16)  - attempted to restart home sotalol, but patient HR returned to 150s in afternoon (7/16)  - Amio restarted amio 150 mg followed by loading dose of 1 mg/min for 6 hours and 0.5 mg/min for 18 hours (7/16)  - received amio 150 overnight (7/17)  Plan:  - Hold eliquis   - Continue amio 400 mg q8 for 12 doses followed by 200 mg daily (first 400mg dose 1300 on 7/17)  - Hold home sotalol    PULMONARY  #Intubated for airway protection prior to EGD/IR embolization (RESOLVED)  Course:  - Intubated 7/9  - Extubated 7/15  - On RA, satting well    #Sputum production  - Patient complaining of cough and phlegm  - Patient able to self-suction  Plan:  - Airway clearance protocol    RENAL/  #RAZIA   #ATN i.s.o hypotension  #metabolic acidosis   - Baseline Cr  Findings:  - pH 7.37 (7/16)  - Cr 1.53 (7/16)  - AG 18 (7/17) with high glucose, concern for DKA but ABG pH 7.39, pCO2 26 pO2 87, pHCO3 16, Beta hydroxybutyrate 0 (7/17)  - AG 13  Plan:  - if has AG again, ABG and if acidotic, consider BiPAP  - f/u nephro recs    - consider diuresis for volume overload pending nephro recs, but in light of recent BM pattern, hold off  - Continue sodium bicarb 1300 mg tid, added per nephro  - trend BUN/Cr   - monitor Is and Os     #Urinary retention  Plan:  - Started Doxazosin 2 mg daily (7/15)  - Monitor I/Os    GASTROINTESTINAL  #Upper GI Bleed (RESOLVED)  Course:  - s/p 5 units pRBC prior to MICU and then 4:2:2 on 7/12  - CT A/P 7/9 - Active bleeding in the third portion of the duodenum. Progression of liver metastases.  - EGD 7/9 showed esophagitis, One non-bleeding duodenal ulcer with a clean ulcer base (Arjun Class III). One oozing duodenal ulcer with spurting hemorrhage (Arjun Class Ia). Treatment not successful. Treated with bipolar cautery.  - s/p IR embolization on 7/9  Plan:  - per GI recs, changed PPI gtt to PPI IV BID  - monitor Hgb, transfuse as needed   - hold eliquis   - continue to f/u with GI and IR     - Per GI, "Will need PPI BID for 8 weeks for grade D esophagitis and PUD"  - reconsult GI for melanotic stool    #Nutrition  Course:  - placed OG tube per GI and started ensure clear liquid (see note from RD 7/13/24)  - OG tube removed during extubation (7/15/24)  -dysphagia screen failed  - FEES (7/17) failed  - ENT consulted for vallecular lesion, diagnosed vallecular cyst, no inpatient intervention required, outpatient f/u  Plan:  - NG tube in place  - Diet per RD recs (placed 7/17)  - Speech and swallow to reevaluate overall status improves    #Diarrhea (IMPROVED)  Course:  - stopped maintenance fluids   - positive for c. diff (7/14)  - started vancomycin (7/14 -)   - having worsening diarrhea, no melena (7/16)  Plan:  - Continue vancomycin  - Fluids as necessary to compensate for volume loss 2/2 diarrhea    #Transaminitis (WORSENED)  Findings:  - Bili 1.1 (7/16) -> 1.3 (7/17)  - Alk P 359 (7/16)-> 484 (7/17)  -  (7/16)->129 (7/17)  - ALT 67 (7/16)-> 72 (7/17)  - RUQ US (7/17): known liver mets, no acute findings  Plan:  - continue to hold statin    INFECTIOUS DISEASE  #leukocytosis (UNCHANGED)  Findings/Course:  -  BCx 7/10 - NGTD  - Sputum Cx from ETT 7/13 showed numerous gram neg krishna (Klebsiella oxytoca/raoultella ornithinolytica)  - Started zosyn for empiric treatment (7/11 - )   - GI panel: previously indeterminate norovirus (7/8), negative (7/13)     - per ID- no intervention needed regarding the PCR results  - c. diff + (7/14)  - started vancomycin for c. diff (7/14- )  - febrile 7/16 overnight  - currently afebrile 7/17  - MRSA Swab: negative  - Staph aureus PCR positive  Plan:  - Continue vancomycin 125 mg q6 for c. diff  - Continue zosyn 3.375 g IV until 7/18 evening  - If persistent fevers, consider escalating vancomycin to 250mg q6 or adding flagyl  - If febrile again, send cultures  - Continue to monitor for fevers    ENDOCRINE  #adrenal insufficiency   Findings:  -7/5 cortisol 17   - 7/11 cortisol 37.1     HEMATOLOGY/ONCOLOGY   # neuroendocrine tumor   - was on lanreotide then had POD in liver and started on peptide receptor radiotherapy (PRRT)- typically given every 8 weeks for 4 doses. He last received it 10/20/2023   - PET 5/28/24 shows new somatostatin avid osseous lesions; decreased intensely avid right supradiaphragmatic and upper abdominal nodes, suspicious for mets  Plan:  - per heme/onc:    - can resume octreotide 150 mcg bid once infection r/o      - can check factor levels V, VIII, X     - "-- f/u with Dr. Sophia Prasad at [facility] [place] after discharge, no plan for treatment inpatient, if clinically improves/stabilizes then can be considered for treatment outpatient"    #DVT ppx  - SCDs  - LE duplex (7/17): negative for DVT    SKINS:   - Lines/Tubes - PIV, cordis replaced with central line (7/14)  Plan:  - Remove central line today, transition henny to PIV (7/17)    Ethics:   - Full Code   - GOC 7/12, spoke to spouse (Yamilet) over phone with palliative care team, discussed GOC again on 7/16    Consults this admission: GI, Heme Onc, Palliative, Endocrinology, Cardiology, Nephrology Assessment	  Assessment: 77 yo male with metastatic neuroendocrine pancreatic cancer (tx on hold) and PMH of CAD s/p PCI x3 with most recent stent 6/12/24 on Plavix and eliquis , chronic Afib on eliquis, orthostatic hypotension on midodrine, PAD , recent admission for dx cath on 6/24/24 presented from PCP's office for hypotension despite taking AM midodrine dose on 7/2, admitted to medicine for symptomatic hypotension and RAZIA. Per chart, on 7/8 PM, pt was noted to have acute onset of melena x5 and coffee ground emesis x2-3. RRT was called on 7/9 AM for hypotension, hgb dropped from 9.2 to 7.4 (admission baseline 10-11), FOBT +, pt was started on PPI IV and transfused 1 unit of pRBC. GI was consulted and underwent EGD on 7/9 afternoon. MICU was consulted for uncontrolled bleeding during EGD. During EGD, pt became hypotensive and was given phenylephrine, had A-fib with RVR s/p amiodarone. Now s/p IR GDA embolization, admitted to MICU for further moniring i.s.o hemorrhagic shock 2/2 GI bleed. On repeat EGD, oozing but no active bleeding was noted. Hospital course c/b c.diff, and patient has been placed on vancomycin.    Plan:     NEUROLOGIC  # Baseline AOx3  Course:  - intubated 7/9  - weaned off propofol onto precedex  - extubated 7/15  Plan:  - fentanyl prn for pain control (last dose 7/12/24)    CARDIOVASCULAR  # hemorrhagic shock   Course:  - RRT called 7/9 for hypotension   - 2/2 to Upper GI bleeds, urgently took to EGD, found bleeding ulcer that was not well controlled, underwent IR empiric embolization (7/9)  - s/p 5 pRBC prior to MICU, and 4 pRBC, 2 FFP, 2 platelets in MICU  - Restarted plavix 7/13/24 for new stent (6/12/24) per GI  - on henny, maintain MAP > 65; took off vaso and precedex (7/13/24), he got tachy to 118. Added precedex back. vasopressin added 7/14  - per spouse, pt's baseline BP is a little bit over 100  - Albumin 5%  mL given 7/16  - Vasopressin stopped (7/17)  Findings:  - AM cortisol 17 (7/5), AM cortisol 37.1 (7/11)  - POCUS ECHO (7/16) showed no cardiogenic shock, no tamponade, low SV indeterminate IVC, irregular B lines but not concerning for pulmonary edema, and some subdiaphragmatic fluid  Plan:  - Transfuse as needed  - Continue henny, try to wean (goal MAP >65)   - Continue plavix 75 mg daily  - Continue midodrine 30mg q8  - Continue droxidopa 300mg q8   - Start IVF if not volume overloaded given recent stool patterns      #CAD s/p PCI x3, most recent stent 6/12/24, NURIA to pLAD (UNCHANGED)  Course:  - restarted plavix 7/13/24 for new stent (6/12/24) per GI   Plan:  - continue to hold statin because of elevated LFTs    #PAD  # A-fib on eliquis WORSENED  - s/p successful DCCV 10/31   - In RVR 7/16  - Cards recommended resuming home dose sotalol 40 mg PO (qTC wnl)  - s/p 1 dose sotalol but RVR persisted  - s/p 2 doses of amio overnight (7/16)  - attempted to restart home sotalol, but patient HR returned to 150s in afternoon (7/16)  - Amio restarted amio 150 mg followed by loading dose of 1 mg/min for 6 hours and 0.5 mg/min for 18 hours (7/16)  - received amio 150 overnight (7/17)  Plan:  - Hold eliquis   - Continue amio 400 mg q8 for 12 doses followed by 200 mg daily (first 400mg dose 1300 on 7/17)  - Hold home sotalol    PULMONARY  #Intubated for airway protection prior to EGD/IR embolization (RESOLVED)  Course:  - Intubated 7/9  - Extubated 7/15  - On RA, satting well    #Sputum production  - Patient complaining of cough and phlegm  - Patient able to self-suction  Plan:  - Airway clearance protocol    RENAL/  #RAZIA   #ATN i.s.o hypotension  #metabolic acidosis   - Baseline Cr  Findings:  - pH 7.37 (7/16)  - Cr 1.53 (7/16)  - AG 18 (7/17) with high glucose, concern for DKA but ABG pH 7.39, pCO2 26 pO2 87, pHCO3 16, Beta hydroxybutyrate 0 (7/17)  - AG 13  Plan:  - if has AG again, ABG and if acidotic, consider BiPAP  - f/u nephro recs    - consider diuresis for volume overload pending nephro recs, but in light of recent BM pattern, hold off  - Continue sodium bicarb 1300 mg tid, added per nephro  - trend BUN/Cr   - monitor Is and Os     #Urinary retention  Plan:  - Started Doxazosin 2 mg daily (7/15)  - Monitor I/Os    GASTROINTESTINAL  #Upper GI Bleed (RESOLVED)  Course:  - s/p 5 units pRBC prior to MICU and then 4:2:2 on 7/12  - CT A/P 7/9 - Active bleeding in the third portion of the duodenum. Progression of liver metastases.  - EGD 7/9 showed esophagitis, One non-bleeding duodenal ulcer with a clean ulcer base (Arjun Class III). One oozing duodenal ulcer with spurting hemorrhage (Arjun Class Ia). Treatment not successful. Treated with bipolar cautery.  - s/p IR embolization on 7/9  Plan:  - per GI recs, changed PPI gtt to PPI IV BID  - monitor Hgb, transfuse as needed   - hold eliquis   - continue to f/u with GI and IR     - Per GI, "Will need PPI BID for 8 weeks for grade D esophagitis and PUD"  - GI reconsulted for melanotic stool and Hgb drop from 10.1 to 9.1, pending recs    #Nutrition  Course:  - placed OG tube per GI and started ensure clear liquid (see note from RD 7/13/24)  - OG tube removed during extubation (7/15/24)  -dysphagia screen failed  - FEES (7/17) failed  - ENT consulted for vallecular lesion, diagnosed vallecular cyst, no inpatient intervention required, outpatient f/u  Plan:  - NG tube in place  - Diet per RD recs (placed 7/17)  - Speech and swallow to reevaluate overall status improves    #Diarrhea (IMPROVED)  Course:  - stopped maintenance fluids   - positive for c. diff (7/14)  - started vancomycin (7/14 -)   - having worsening diarrhea, no melena (7/16)  Plan:  - Continue vancomycin  - Fluids as necessary to compensate for volume loss 2/2 diarrhea    #Transaminitis (WORSENED)  Findings:  - Bili 1.1 (7/16) -> 1.3 (7/17)  - Alk P 359 (7/16)-> 484 (7/17)  -  (7/16)->129 (7/17)  - ALT 67 (7/16)-> 72 (7/17)  - RUQ US (7/17): known liver mets, no acute findings  Plan:  - continue to hold statin    INFECTIOUS DISEASE  #leukocytosis (UNCHANGED)  Findings/Course:  -  BCx 7/10 - NGTD  - Sputum Cx from ETT 7/13 showed numerous gram neg krishna (Klebsiella oxytoca/raoultella ornithinolytica)  - Started zosyn for empiric treatment (7/11 - )   - GI panel: previously indeterminate norovirus (7/8), negative (7/13)     - per ID- no intervention needed regarding the PCR results  - c. diff + (7/14)  - started vancomycin for c. diff (7/14- )  - febrile 7/16 overnight  - currently afebrile 7/17  - MRSA Swab: negative  - Staph aureus PCR positive  Plan:  - Continue vancomycin 125 mg q6 for c. diff  - Continue zosyn 3.375 g IV until 7/18 evening  - If persistent fevers, consider escalating vancomycin to 250mg q6 or adding flagyl  - If febrile again, send cultures  - Continue to monitor for fevers    ENDOCRINE  #adrenal insufficiency   Findings:  -7/5 cortisol 17   - 7/11 cortisol 37.1     HEMATOLOGY/ONCOLOGY   # neuroendocrine tumor   - was on lanreotide then had POD in liver and started on peptide receptor radiotherapy (PRRT)- typically given every 8 weeks for 4 doses. He last received it 10/20/2023   - PET 5/28/24 shows new somatostatin avid osseous lesions; decreased intensely avid right supradiaphragmatic and upper abdominal nodes, suspicious for mets  Plan:  - per heme/onc:    - can resume octreotide 150 mcg bid once infection r/o      - can check factor levels V, VIII, X     - "-- [place] [facility] [place] plan for treatment inpatient, if clinically improves/stabilizes then can be considered for treatment outpatient"    #DVT ppx  - SCDs  - LE duplex (7/17): negative for DVT    SKINS:   - Lines/Tubes - PIV, cordis replaced with central line (7/14)  Plan:  - Remove central line today, transition henny to PIV (7/17)    Ethics:   - Full Code   - GOC 7/12, spoke to spouse (Yamilet) over phone with palliative care team, discussed GOC again on 7/16    Consults this admission: GI, Heme Onc, Palliative, Endocrinology, Cardiology, Nephrology Assessment	  Assessment: 79 yo male with metastatic neuroendocrine pancreatic cancer (tx on hold) and PMH of CAD s/p PCI x3 with most recent stent 6/12/24 on Plavix and eliquis , chronic Afib on eliquis, orthostatic hypotension on midodrine, PAD , recent admission for dx cath on 6/24/24 presented from PCP's office for hypotension despite taking AM midodrine dose on 7/2, admitted to medicine for symptomatic hypotension and RAZIA. Per chart, on 7/8 PM, pt was noted to have acute onset of melena x5 and coffee ground emesis x2-3. RRT was called on 7/9 AM for hypotension, hgb dropped from 9.2 to 7.4 (admission baseline 10-11), FOBT +, pt was started on PPI IV and transfused 1 unit of pRBC. GI was consulted and underwent EGD on 7/9 afternoon. MICU was consulted for uncontrolled bleeding during EGD. During EGD, pt became hypotensive and was given phenylephrine, had A-fib with RVR s/p amiodarone. Now s/p IR GDA embolization, admitted to MICU for further moniring i.s.o hemorrhagic shock 2/2 GI bleed. On repeat EGD, oozing but no active bleeding was noted. Hospital course c/b c.diff, and patient has been placed on vancomycin.    Plan:     NEUROLOGIC  # Baseline AOx3  Course:  - intubated 7/9  - weaned off propofol onto precedex  - extubated 7/15  - reintubated 7/18 for EGD  Plan:  - Restarted precedex  - fentanyl prn for pain control (last dose 7/12/24)    CARDIOVASCULAR  # hemorrhagic shock   Course:  - RRT called 7/9 for hypotension   - 2/2 to Upper GI bleeds, urgently took to EGD, found bleeding ulcer that was not well controlled, underwent IR empiric embolization (7/9)  - s/p 5 pRBC prior to MICU, and 4 pRBC, 2 FFP, 2 platelets in MICU (7/17)  - Restarted plavix 7/13/24 for new stent (6/12/24) per GI  - on henny, maintain MAP > 65; took off vaso and precedex (7/13/24), he got tachy to 118. Added precedex back. vasopressin added 7/14  - per spouse, pt's baseline BP is a little bit over 100  - Albumin 5%  mL given 7/16  - Vasopressin stopped (7/17)  - On 7/18, patient had large melanotic stool with Hgb drop from 10.1-7.1, patient was reintubated for GI scope  Findings:  - AM cortisol 17 (7/5), AM cortisol 37.1 (7/11)  - POCUS ECHO (7/16) showed no cardiogenic shock, no tamponade, low SV indeterminate IVC, irregular B lines but not concerning for pulmonary edema, and some subdiaphragmatic fluid  Plan:  - Transfuse as needed  - Continue henny, try to wean (goal MAP >65), restarted vaso for intubation  - D/c plavix 75 mg because of bleeding  - Start IVF if not volume overloaded given recent stool patterns  - Continue midodrine 30mg q8  - Continue droxidopa 300mg q8   - Ordered 2 units pRBCs and 1 unit platelets      #CAD s/p PCI x3, most recent stent 6/12/24, NURIA to pLAD (UNCHANGED)  Course:  - restarted plavix 7/13/24 for new stent (6/12/24) per GI   Plan:  -d/c plavix because of bleeding  - continue to hold statin because of elevated LFTs    #PAD  # A-fib on eliquis WORSENED  - s/p successful DCCV 10/31   - In RVR 7/16  - Cards recommended resuming home dose sotalol 40 mg PO (qTC wnl)  - s/p 1 dose sotalol but RVR persisted  - s/p 2 doses of amio overnight (7/16)  - attempted to restart home sotalol, but patient HR returned to 150s in afternoon (7/16)  - Amio restarted amio 150 mg followed by loading dose of 1 mg/min for 6 hours and 0.5 mg/min for 18 hours (7/16)  - received amio 150 overnight (7/17)  Plan:  - Hold eliquis   - Continue amio 400 mg q8 for 12 doses followed by 200 mg daily (first 400mg dose 1300 on 7/17)  - Hold home sotalol    PULMONARY  #Intubated for airway protection prior to EGD/IR embolization  Course:  - Intubated 7/9  - Extubated 7/15  - Intubated again 7/18  Plan:  - Continue intubation pending GI scope today and any further interventions necessary    #Sputum production  - Patient complaining of cough and phlegm  - Patient able to self-suction (7/17)  Plan:  - Currently intubated  - Airway clearance protocol    RENAL/  #RAZIA   #ATN i.s.o hypotension  #metabolic acidosis   Findings:  - pH 7.37 (7/16)  - Cr 1.53 (7/16)  - AG 18 (7/17) with high glucose, concern for DKA but ABG pH 7.39, pCO2 26 pO2 87, pHCO3 16, Beta hydroxybutyrate 0 (7/17)  - AG 13  Plan:  - if has AG again, ABG and if acidotic, consider BiPAP  - f/u nephro recs    - consider diuresis for volume overload pending nephro recs, but in light of recent BM pattern, hold off  - Continue sodium bicarb 1300 mg tid, added per nephro  - trend BUN/Cr   - monitor Is and Os     #Urinary retention  Plan:  - Started Doxazosin 2 mg daily (7/15)  - Monitor I/Os    GASTROINTESTINAL  #Upper GI Bleed (RESOLVED)  Course:  - s/p 5 units pRBC prior to MICU and then 4:2:2 on 7/12  - CT A/P 7/9 - Active bleeding in the third portion of the duodenum. Progression of liver metastases.  - EGD 7/9 showed esophagitis, One non-bleeding duodenal ulcer with a clean ulcer base (Arjun Class III). One oozing duodenal ulcer with spurting hemorrhage (Arjun Class Ia). Treatment not successful. Treated with bipolar cautery.  - s/p IR embolization on 7/9  - Patient had melanotic stool overnight (7/18) and hemoglobin drop 10.1 to 7.1 since yesterday  Plan:  - per GI recs, changed PPI gtt to PPI IV BID  - monitor Hgb, transfuse as needed   - hold eliquis   - continue to f/u with GI and IR     - Per GI, "Will need PPI BID for 8 weeks for grade D esophagitis and PUD"  - Ordered 2 units pRBCs and 1 unit platelets  - GI reconsulted for melanotic stool  - GI will re-scope this afternoon  - Surgery on board, pending recs following scope    #Nutrition  Course:  - placed OG tube per GI and started ensure clear liquid (see note from RD 7/13/24)  - OG tube removed during extubation (7/15/24)  -dysphagia screen failed  - FEES (7/17) failed  - ENT consulted for vallecular lesion, diagnosed vallecular cyst, no inpatient intervention required, outpatient f/u  Plan:  - NG tube in place  - Diet per RD recs (placed 7/17)  - Speech and swallow to reevaluate overall status improves    #Diarrhea (IMPROVED)  Course:  - stopped maintenance fluids   - positive for c. diff (7/14)  - started vancomycin (7/14 -)   - having worsening diarrhea, no melena (7/16)  Plan:  - Continue vancomycin  - Fluids as necessary to compensate for volume loss 2/2 diarrhea    #Transaminitis (WORSENED)  Findings:  - Bili 1.1 (7/16) -> 1.3 (7/17)  - Alk P 359 (7/16)-> 484 (7/17)  -  (7/16)->129 (7/17)  - ALT 67 (7/16)-> 72 (7/17)  - RUQ US (7/17): known liver mets, no acute findings  Plan:  - continue to hold statin    INFECTIOUS DISEASE  #leukocytosis (UNCHANGED)  Findings/Course:  -  BCx 7/10 - NGTD  - Sputum Cx from ETT 7/13 showed numerous gram neg krishna (Klebsiella oxytoca/raoultella ornithinolytica)  - Started zosyn for empiric treatment (7/11 - )   - GI panel: previously indeterminate norovirus (7/8), negative (7/13)     - per ID- no intervention needed regarding the PCR results  - c. diff + (7/14)  - started vancomycin for c. diff (7/14- )  - febrile 7/16 overnight  - currently afebrile 7/17  - MRSA Swab: negative  - Staph aureus PCR positive  Plan:  - Continue vancomycin 125 mg q6 for c. diff  - Continue zosyn 3.375 g IV until 7/18 evening  - If persistent fevers, consider escalating vancomycin to 250mg q6 or adding flagyl  - If febrile again, send cultures  - Continue to monitor for fevers    ENDOCRINE  #adrenal insufficiency   Findings:  -7/5 cortisol 17   - 7/11 cortisol 37.1     HEMATOLOGY/ONCOLOGY   # neuroendocrine tumor   - was on lanreotide then had POD in liver and started on peptide receptor radiotherapy (PRRT)- typically given every 8 weeks for 4 doses. He last received it 10/20/2023   - PET 5/28/24 shows new somatostatin avid osseous lesions; decreased intensely avid right supradiaphragmatic and upper abdominal nodes, suspicious for mets  Plan:  - per heme/onc:    - can resume octreotide 150 mcg bid once infection r/o      - can check factor levels V, VIII, X     - "-- f[place] Pras[place] [facility] [place], no plan for treatment inpatient, if clinically improves/stabilizes then can be considered for treatment outpatient"    #DVT ppx  - SCDs  - LE duplex (7/17): negative for DVT    SKINS:   - Lines/Tubes - PIV, cordis replaced with central line (7/14)    Ethics:   - Full Code   - GOC 7/12, spoke to spouse (Yamilet) over phone with palliative care team, discussed GOC again on 7/16    Consults this admission: GI, Heme Onc, Palliative, Endocrinology, Cardiology, Nephrology Assessment	  Assessment: 77 yo male with metastatic neuroendocrine pancreatic cancer (tx on hold) and PMH of CAD s/p PCI x3 with most recent stent 6/12/24 on Plavix and eliquis , chronic Afib on eliquis, orthostatic hypotension on midodrine, PAD , recent admission for dx cath on 6/24/24 presented from PCP's office for hypotension despite taking AM midodrine dose on 7/2, admitted to medicine for symptomatic hypotension and RAZIA. Per chart, on 7/8 PM, pt was noted to have acute onset of melena x5 and coffee ground emesis x2-3. RRT was called on 7/9 AM for hypotension, hgb dropped from 9.2 to 7.4 (admission baseline 10-11), FOBT +, pt was started on PPI IV and transfused 1 unit of pRBC. GI was consulted and underwent EGD on 7/9 afternoon. MICU was consulted for uncontrolled bleeding during EGD. During EGD, pt became hypotensive and was given phenylephrine, had A-fib with RVR s/p amiodarone. Now s/p IR GDA embolization, admitted to MICU for further moniring i.s.o hemorrhagic shock 2/2 GI bleed. On repeat EGD, oozing but no active bleeding was noted. Hospital course c/b c.diff, and patient has been placed on vancomycin. Patient had melanotic stool 7/18 with Hgb drop 10.1 to 7.1 and received 2 units pRBC and 1 unit platelets. Patient underwent EGD 7/18 afternoon and found duodenal bleed. Patient now s/p 3x clips and epi.    Plan:     NEUROLOGIC  # Baseline AOx3  Course:  - intubated 7/9  - weaned off propofol onto precedex  - extubated 7/15  - reintubated 7/18 for EGD  Plan:  - Restarted propofol for sedation  - Discuss length of intubation tomorrow  - fentanyl prn for pain control (last dose 7/12/24)    CARDIOVASCULAR  # hemorrhagic shock   Course:  - RRT called 7/9 for hypotension   - 2/2 to Upper GI bleeds, urgently took to EGD, found bleeding ulcer that was not well controlled, underwent IR empiric embolization (7/9)  - s/p 5 pRBC prior to MICU, and 4 pRBC, 2 FFP, 2 platelets in MICU (7/17)  - Restarted plavix 7/13/24 for new stent (6/12/24) per GI  - on henny, maintain MAP > 65; took off vaso and precedex (7/13/24), he got tachy to 118. Added precedex back. vasopressin added 7/14  - per spouse, pt's baseline BP is a little bit over 100  - Albumin 5%  mL given 7/16  - Vasopressin stopped (7/17)  - On 7/18, patient had large melanotic stool with Hgb drop from 10.1-7.1, patient was reintubated for GI scope  Findings:  - AM cortisol 17 (7/5), AM cortisol 37.1 (7/11)  - POCUS ECHO (7/16) showed no cardiogenic shock, no tamponade, low SV indeterminate IVC, irregular B lines but not concerning for pulmonary edema, and some subdiaphragmatic fluid  Plan:  - Transfuse as needed  - Continue henny, try to wean (goal MAP >65), restarted vaso for intubation  - D/c plavix 75 mg because of bleeding  - Start IVF if not volume overloaded given recent stool patterns  - Continue midodrine 30mg q8  - Continue droxidopa 300mg q8   - Ordered 2 units pRBCs and 1 unit platelets      #CAD s/p PCI x3, most recent stent 6/12/24, NURIA to pLAD  Course:  - restarted plavix 7/13/24 for new stent (6/12/24) per GI   Plan:  -d/c plavix because of bleeding  - continue to hold statin because of elevated LFTs    #PAD  # A-fib on eliquis WORSENED  - s/p successful DCCV 10/31   - In RVR 7/16  - Cards recommended resuming home dose sotalol 40 mg PO (qTC wnl)  - s/p 1 dose sotalol but RVR persisted  - s/p 2 doses of amio overnight (7/16)  - attempted to restart home sotalol, but patient HR returned to 150s in afternoon (7/16)  - Amio restarted amio 150 mg followed by loading dose of 1 mg/min for 6 hours and 0.5 mg/min for 18 hours (7/16)  - received amio 150 overnight (7/17)  Plan:  - Hold eliquis   - Continue amio 400 mg q8 for 12 doses followed by 200 mg daily (first 400mg dose 1300 on 7/17)  - Hold home sotalol    PULMONARY  #Intubated for airway protection prior to EGD/IR embolization  Course:  - Intubated 7/9  - Extubated 7/15  - Intubated again 7/18  Plan:  - Continue intubation pending GI scope today and any further interventions necessary    #Sputum production  - Patient complaining of cough and phlegm  - Patient able to self-suction (7/17)  Plan:  - Currently intubated  - Airway clearance protocol    RENAL/  #RAZIA   #ATN i.s.o hypotension  #metabolic acidosis   Findings:  - pH 7.37 (7/16)  - Cr 1.53 (7/16)  - AG 18 (7/17) with high glucose, concern for DKA but ABG pH 7.39, pCO2 26 pO2 87, pHCO3 16, Beta hydroxybutyrate 0 (7/17)  - AG 13  Plan:  - if has AG again, ABG and if acidotic, consider BiPAP  - f/u nephro recs    - consider diuresis for volume overload pending nephro recs, but in light of recent BM pattern, hold off  - Continue sodium bicarb 1300 mg tid, added per nephro  - trend BUN/Cr   - monitor Is and Os     #Urinary retention  Plan:  - Started Doxazosin 2 mg daily (7/15)  - Monitor I/Os    GASTROINTESTINAL  #Upper GI Bleed  Course:  - s/p 5 units pRBC prior to MICU and then 4:2:2 on 7/12  - CT A/P 7/9 - Active bleeding in the third portion of the duodenum. Progression of liver metastases.  - EGD 7/9 showed esophagitis, One non-bleeding duodenal ulcer with a clean ulcer base (Arjun Class III). One oozing duodenal ulcer with spurting hemorrhage (Arjun Class Ia). Treatment not successful. Treated with bipolar cautery.  - s/p IR embolization on 7/9  - Patient had melanotic stool overnight (7/18) and hemoglobin drop 10.1 to 7.1 since yesterday  Plan:  - per GI recs, changed PPI gtt to PPI IV BID  - monitor Hgb, transfuse as needed   - hold eliquis   - continue to f/u with GI and IR     - Per GI, "Will need PPI BID for 8 weeks for grade D esophagitis and PUD"  - Ordered 2 units pRBCs and 1 unit platelets  - GI reconsulted for melanotic stool and scoped this afternoon    - found a duodenal bleed, s/p 3 clips and epinephrine, pending final procedure report  - Reached out to surgery regarding recurrent bleeding per GI    #Nutrition  Course:  - placed OG tube per GI and started ensure clear liquid (see note from RD 7/13/24)  - OG tube removed during extubation (7/15/24)  -dysphagia screen failed  - FEES (7/17) failed  - ENT consulted for vallecular lesion, diagnosed vallecular cyst, no inpatient intervention required, outpatient f/u  Plan:  - OG tube in place  - Diet per RD recs (placed 7/17)  - Speech and swallow to reevaluate overall status improves    #Diarrhea  Course:  - stopped maintenance fluids   - positive for c. diff (7/14)  - started vancomycin (7/14 -)   - having worsening diarrhea, no melena (7/16)  Plan:  - Continue vancomycin  - Fluids as necessary to compensate for volume loss 2/2 diarrhea    #Transaminitis  Findings:  - Bili 1.1 (7/16) -> 1.3 (7/17)  - Alk P 359 (7/16)-> 484 (7/17)  -  (7/16)->129 (7/17)  - ALT 67 (7/16)-> 72 (7/17)  - RUQ US (7/17): known liver mets, no acute findings  Plan:  - continue to hold statin    INFECTIOUS DISEASE  #leukocytosis (UNCHANGED)  Findings/Course:  -  BCx 7/10 - NGTD  - Sputum Cx from ETT 7/13 showed numerous gram neg krishna (Klebsiella oxytoca/raoultella ornithinolytica)  - Started zosyn for empiric treatment (7/11 - 7/18)   - GI panel: previously indeterminate norovirus (7/8), negative (7/13)     - per ID- no intervention needed regarding the PCR results  - c. diff + (7/14)  - started vancomycin for c. diff (7/14- )  - febrile 7/16 overnight  - currently afebrile 7/17  - MRSA Swab: negative  - Staph aureus PCR positive  - Culture resistant to Zosyn (7/18)  Plan:  - Continue vancomycin 125 mg q6 for c. diff  - Switched to meropenem 1000 mg q8  - If persistent fevers, consider escalating vancomycin to 250mg q6 or adding flagyl  - If febrile again, send cultures  - Continue to monitor for fevers    ENDOCRINE  #adrenal insufficiency   Findings:  -7/5 cortisol 17   - 7/11 cortisol 37.1     HEMATOLOGY/ONCOLOGY   # neuroendocrine tumor   - was on lanreotide then had POD in liver and started on peptide receptor radiotherapy (PRRT)- typically given every 8 weeks for 4 doses. He last received it 10/20/2023   - PET 5/28/24 shows new somatostatin avid osseous lesions; decreased intensely avid right supradiaphragmatic and upper abdominal nodes, suspicious for mets  Plan:  - per heme/onc:    - can resume octreotide 150 mcg bid once infection r/o      - can check factor levels V, VIII, X     - "-- f/u with Dr. Sophia Prasad at [facility] after discharge, no plan for treatment inpatient, if clinically improves/stabilizes then can be considered for treatment outpatient"    #DVT ppx  - SCDs  - LE duplex (7/17): negative for DVT    SKINS:   - Lines/Tubes - PIV, cordis replaced with central line (7/14)    Ethics:   - Full Code   - GOC 7/12, spoke to spouse (Yamilet) over phone with palliative care team, discussed GOC again on 7/16    Consults this admission: GI, Heme Onc, Palliative, Endocrinology, Cardiology, Nephrology

## 2024-07-18 NOTE — PROGRESS NOTE ADULT - SUBJECTIVE AND OBJECTIVE BOX
DATE OF SERVICE: 07-18-24 @ 16:15    Patient is a 78y old  Male who presents with a chief complaint of hypotension (18 Jul 2024 14:11)      INTERVAL HISTORY:     REVIEW OF SYSTEMS:  CONSTITUTIONAL: No weakness  EYES/ENT: No visual changes;  No throat pain   NECK: No pain or stiffness  RESPIRATORY: No cough, wheezing; No shortness of breath  CARDIOVASCULAR: No chest pain or palpitations  GASTROINTESTINAL: No abdominal  pain. No nausea, vomiting, or hematemesis  GENITOURINARY: No dysuria, frequency or hematuria  NEUROLOGICAL: No stroke like symptoms  SKIN: No rashes    TELEMETRY Personally reviewed:  	  MEDICATIONS:  aMIOdarone    Tablet 400 milliGRAM(s) Oral every 8 hours  aMIOdarone    Tablet   Oral   doxazosin 2 milliGRAM(s) Oral at bedtime  droxidopa 200 milliGRAM(s) Oral every 8 hours  midodrine 30 milliGRAM(s) Oral every 8 hours  phenylephrine    Infusion 0.25 MICROgram(s)/kG/Min IV Continuous <Continuous>        PHYSICAL EXAM:  T(C): 37.1 (07-18-24 @ 12:00), Max: 37.1 (07-17-24 @ 20:00)  HR: 104 (07-18-24 @ 14:30) (101 - 163)  BP: 117/67 (07-18-24 @ 14:30) (49/25 - 128/71)  RR: 29 (07-18-24 @ 14:30) (20 - 38)  SpO2: 99% (07-18-24 @ 14:30) (92% - 100%)  Wt(kg): --  I&O's Summary    17 Jul 2024 07:01  -  18 Jul 2024 07:00  --------------------------------------------------------  IN: 2721.4 mL / OUT: 800 mL / NET: 1921.4 mL    18 Jul 2024 07:01  -  18 Jul 2024 16:15  --------------------------------------------------------  IN: 1895.3 mL / OUT: 0 mL / NET: 1895.3 mL          Appearance: In no distress	  HEENT:    PERRL, EOMI	  Cardiovascular:  S1 S2, No JVD  Respiratory: Lungs clear to auscultation	  Gastrointestinal:  Soft, Non-tender, + BS	  Vascularature:  No edema of LE  Psychiatric: Appropriate affect   Neuro: no acute focal deficits                               7.1    14.23 )-----------( 77       ( 18 Jul 2024 08:35 )             22.0     07-18    143  |  114<H>  |  55<H>  ----------------------------<  185<H>  3.7   |  16<L>  |  1.30    Ca    7.2<L>      18 Jul 2024 00:21  Phos  2.6     07-18  Mg     1.5     07-18    TPro  4.1<L>  /  Alb  2.0<L>  /  TBili  0.9  /  DBili  x   /  AST  82<H>  /  ALT  54<H>  /  AlkPhos  383<H>  07-18        Labs personally reviewed      ASSESSMENT/PLAN: 	    78-year-old male multiple medical problems history of hepatocellular carcinoma status post radiation therapy, AF s/p DCCV on Eliquis who was found to be hypotensive following NST. Patient has reported general weakness, fatigue, lightheadedness since being discharged from hospital. Reports mild chest discomfort in midsternal region. Reports dyspnea with minimal exertion. Also reports significant lack of appetite and poor PO intake. No dark or bloody stool, nausea or vomiting.       Problem/Plan - 1:  ·  Problem: Hypotension  ·  Plan: Continue with pressors in MICU  - Patient presents with hypotension and also RAZIA. Has known orthostatic hypotension.   - Patient and wife report decreased PO intake likely 2/2 malignancy.  - GIB 7/9, s/p 4 units prbc, IR for mesenteric embolization, now in MICU on pressors  - c/w Midodrine and droxidopa     Problem/Plan - 2:  ·  Problem: CAD.   ·  Plan: Recent PCI to pLAD in June 2023  - ECG non-ischemic  - Plavix held today given large melena     Problem/Plan - 3:  ·  Problem: Atrial Fibrillation  - s/p succesful DCCV 10/31  - Hold Eliquis 5mg BID given GIB  - C/w Amio 400mg TID    Problem/Plan - 4:  ·  Problem: H/O Pericarditis.   ·  Plan: Chest pain now improved since last admission.  -  d/c colchicine 0.6mg PO daily    Problem/Plan - 4:  ·  Problem: Preop Risk Stratification.   ·  Plan: Patient is moderate risk for low risk, urgent endoscopy.  No cardiac contraindication to proceeed.  Ideally should continue Plavix/Cangleror given recent Stent if able.     Problem/Plan - 5:  ·  Problem: CKD  ·  Plan: RAZIA in setting of hypotension, anemia -> GIB, hemorrhagic shock leading to oliguric ATN received CT IV contrast, and emergently went to IR for mesenteric embolization 7/9  now creatinine is improving, non oliguric , decreasing pressor requirements.   acidosis, continue sodium bicarb drip  strict I/O  monitor creatinine closely  nephro on board     Problem/Plan - 5:  ·  Problem: DVT prophylactic   ·  Plan: SCD's  - resume Eliquis when able            ROSIO Latham-NP   Hank Hayes DO [facility]  Cardiovascular Medicine  [address] [place] [address], [address]  Available through call or text on [employer]  Office: [phone]   DATE OF SERVICE: 07-18-24 @ 16:15    Patient is a 78y old  Male who presents with a chief complaint of hypotension (18 Jul 2024 14:11)      INTERVAL HISTORY: Intubated and sedated.     REVIEW OF SYSTEMS: Unable to participate in ROS  CONSTITUTIONAL: No weakness  EYES/ENT: No visual changes;  No throat pain   NECK: No pain or stiffness  RESPIRATORY: No cough, wheezing; No shortness of breath  CARDIOVASCULAR: No chest pain or palpitations  GASTROINTESTINAL: No abdominal  pain. No nausea, vomiting, or hematemesis  GENITOURINARY: No dysuria, frequency or hematuria  NEUROLOGICAL: No stroke like symptoms  SKIN: No rashes    TELEMETRY Personally reviewed: -120  	  MEDICATIONS:  aMIOdarone    Tablet 400 milliGRAM(s) Oral every 8 hours  aMIOdarone    Tablet   Oral   doxazosin 2 milliGRAM(s) Oral at bedtime  droxidopa 200 milliGRAM(s) Oral every 8 hours  midodrine 30 milliGRAM(s) Oral every 8 hours  phenylephrine    Infusion 0.25 MICROgram(s)/kG/Min IV Continuous <Continuous>        PHYSICAL EXAM:  T(C): 37.1 (07-18-24 @ 12:00), Max: 37.1 (07-17-24 @ 20:00)  HR: 104 (07-18-24 @ 14:30) (101 - 163)  BP: 117/67 (07-18-24 @ 14:30) (49/25 - 128/71)  RR: 29 (07-18-24 @ 14:30) (20 - 38)  SpO2: 99% (07-18-24 @ 14:30) (92% - 100%)  Wt(kg): --  I&O's Summary    17 Jul 2024 07:01  -  18 Jul 2024 07:00  --------------------------------------------------------  IN: 2721.4 mL / OUT: 800 mL / NET: 1921.4 mL    18 Jul 2024 07:01  -  18 Jul 2024 16:15  --------------------------------------------------------  IN: 1895.3 mL / OUT: 0 mL / NET: 1895.3 mL          Appearance: In no distress	  HEENT:    PERRL, EOMI	  Cardiovascular:  S1 S2, No JVD  Respiratory: Lungs clear to auscultation	  Gastrointestinal:  Soft, Non-tender, + BS	  Vascularature:  No edema of LE  Psychiatric: Appropriate affect   Neuro: no acute focal deficits                               7.1    14.23 )-----------( 77       ( 18 Jul 2024 08:35 )             22.0     07-18    143  |  114<H>  |  55<H>  ----------------------------<  185<H>  3.7   |  16<L>  |  1.30    Ca    7.2<L>      18 Jul 2024 00:21  Phos  2.6     07-18  Mg     1.5     07-18    TPro  4.1<L>  /  Alb  2.0<L>  /  TBili  0.9  /  DBili  x   /  AST  82<H>  /  ALT  54<H>  /  AlkPhos  383<H>  07-18        Labs personally reviewed      ASSESSMENT/PLAN: 	    78-year-old male multiple medical problems history of hepatocellular carcinoma status post radiation therapy, AF s/p DCCV on Eliquis who was found to be hypotensive following NST. Patient has reported general weakness, fatigue, lightheadedness since being discharged from hospital. Reports mild chest discomfort in midsternal region. Reports dyspnea with minimal exertion. Also reports significant lack of appetite and poor PO intake. No dark or bloody stool, nausea or vomiting.       Problem/Plan - 1:  ·  Problem: Hypotension  ·  Plan: Continue with pressors in MICU  - Patient presents with hypotension and also RAZIA. Has known orthostatic hypotension.   - Patient and wife report decreased PO intake likely 2/2 malignancy.  - GIB 7/9, s/p 4 units prbc, IR for mesenteric embolization, now in MICU on pressors  - c/w Midodrine and droxidopa     Problem/Plan - 2:  ·  Problem: CAD.   ·  Plan: Recent PCI to pLAD in June 2023  - ECG non-ischemic  - Plavix held today given large melena     Problem/Plan - 3:  ·  Problem: Atrial Fibrillation  - s/p succesful DCCV 10/31  - Hold Eliquis 5mg BID given GIB  - C/w Amio 400mg TID    Problem/Plan - 4:  ·  Problem: H/O Pericarditis.   ·  Plan: Chest pain now improved since last admission.  -  d/c colchicine 0.6mg PO daily    Problem/Plan - 4:  ·  Problem: Preop Risk Stratification.   ·  Plan: Patient is moderate risk for low risk, urgent endoscopy.  No cardiac contraindication to proceeed.  Ideally should continue Plavix/Cangleror given recent Stent if able.     Problem/Plan - 5:  ·  Problem: CKD  ·  Plan: RAZIA in setting of hypotension, anemia -> GIB, hemorrhagic shock leading to oliguric ATN received CT IV contrast, and emergently went to IR for mesenteric embolization 7/9  now creatinine is improving, non oliguric , decreasing pressor requirements.   acidosis, continue sodium bicarb drip  strict I/O  monitor creatinine closely  nephro on board     Problem/Plan - 5:  ·  Problem: DVT prophylactic   ·  Plan: SCD's  - resume Eliquis when able            Sulma Espinosa, AG-NP   Hank Hayes DO [facility]  Cardiovascular Medicine  [address], [address]  Available through call or text on [employer]  Office: [phone]

## 2024-07-19 NOTE — PROGRESS NOTE ADULT - SUBJECTIVE AND OBJECTIVE BOX
DATE OF SERVICE: 07-19-24 @ 16:36    Patient is a 78y old  Male who presents with a chief complaint of hypotension (19 Jul 2024 15:06)      INTERVAL HISTORY: Intubated and sedated    REVIEW OF SYSTEMS: Unable to participate in ROS  CONSTITUTIONAL: No weakness  EYES/ENT: No visual changes;  No throat pain   NECK: No pain or stiffness  RESPIRATORY: No cough, wheezing; No shortness of breath  CARDIOVASCULAR: No chest pain or palpitations  GASTROINTESTINAL: No abdominal  pain. No nausea, vomiting, or hematemesis  GENITOURINARY: No dysuria, frequency or hematuria  NEUROLOGICAL: No stroke like symptoms  SKIN: No rashes    TELEMETRY Personally reviewed: AF   	  MEDICATIONS:  aMIOdarone    Tablet 400 milliGRAM(s) Oral every 8 hours  aMIOdarone    Tablet   Oral   doxazosin 2 milliGRAM(s) Oral at bedtime  midodrine 30 milliGRAM(s) Oral every 8 hours  norepinephrine Infusion 0.05 MICROgram(s)/kG/Min IV Continuous <Continuous>  phenylephrine    Infusion 0.1 MICROgram(s)/kG/Min IV Continuous <Continuous>        PHYSICAL EXAM:  T(C): 38.2 (07-19-24 @ 15:00), Max: 38.7 (07-19-24 @ 06:00)  HR: 104 (07-19-24 @ 15:46) (89 - 122)  BP: 116/63 (07-19-24 @ 15:30) (76/50 - 149/113)  RR: 23 (07-19-24 @ 15:45) (23 - 36)  SpO2: 100% (07-19-24 @ 15:46) (73% - 100%)  Wt(kg): --  I&O's Summary    18 Jul 2024 07:01  -  19 Jul 2024 07:00  --------------------------------------------------------  IN: 3964.2 mL / OUT: 0 mL / NET: 3964.2 mL    19 Jul 2024 07:01  -  19 Jul 2024 16:36  --------------------------------------------------------  IN: 1180 mL / OUT: 1280 mL / NET: -100 mL          Appearance: In no distress	  HEENT:    PERRL, EOMI	  Cardiovascular:  S1 S2, No JVD  Respiratory: Lungs clear to auscultation	  Gastrointestinal:  Soft, Non-tender, + BS	  Vascularature:  No edema of LE  Psychiatric: Appropriate affect   Neuro: no acute focal deficits                               7.7    16.53 )-----------( 103      ( 19 Jul 2024 13:49 )             24.1     07-19    144  |  117<H>  |  54<H>  ----------------------------<  169<H>  3.9   |  14<L>  |  1.40<H>    Ca    7.5<L>      19 Jul 2024 06:31  Phos  4.0     07-19  Mg     1.7     07-19    TPro  4.3<L>  /  Alb  2.3<L>  /  TBili  0.8  /  DBili  x   /  AST  43<H>  /  ALT  35  /  AlkPhos  220<H>  07-19        Labs personally reviewed      ASSESSMENT/PLAN: 	      78-year-old male multiple medical problems history of hepatocellular carcinoma status post radiation therapy, AF s/p DCCV on Eliquis who was found to be hypotensive following NST. Patient has reported general weakness, fatigue, lightheadedness since being discharged from hospital. Reports mild chest discomfort in midsternal region. Reports dyspnea with minimal exertion. Also reports significant lack of appetite and poor PO intake. No dark or bloody stool, nausea or vomiting.       Problem/Plan - 1:  ·  Problem: Hypotension  ·  Plan: Continue with pressors in MICU  - Patient presents with hypotension and also RAZIA. Has known orthostatic hypotension.   - Patient and wife report decreased PO intake likely 2/2 malignancy.  - GIB 7/9, s/p 4 units prbc, IR for mesenteric embolization, now in MICU on pressors  - c/w Midodrine and IV pressors (wean as tolerated as per MICU)     Problem/Plan - 2:  ·  Problem: CAD.   ·  Plan: Recent PCI to pLAD in June 2023  - ECG non-ischemic  - Plavix held given large melena; resume as soon as possible given recent PCI     Problem/Plan - 3:  ·  Problem: Atrial Fibrillation  - s/p succesful DCCV 10/31  - Hold Eliquis 5mg BID given GIB  - C/w Amio 400mg TID    Problem/Plan - 4:  ·  Problem: Preop Risk Stratification.   ·  Plan: Patient is moderate risk for low risk, urgent endoscopy.  No cardiac contraindication to proceeed.  Ideally should continue Plavix/Cangleror given recent Stent if able.     Problem/Plan - 5:  ·  Problem: CKD  ·  Plan: RAZIA in setting of hypotension, anemia -> GIB, hemorrhagic shock leading to oliguric ATN received CT IV contrast, and emergently went to IR for mesenteric embolization 7/9  now creatinine is improving, non oliguric , decreasing pressor requirements.   acidosis, continue sodium bicarb drip  strict I/O  monitor creatinine closely  nephro on board     Problem/Plan - 5:  ·  Problem: DVT prophylactic   ·  Plan: SCD's  - resume Eliquis when able      Sulma Espinosa, AG-NP   Hank Hayes DO [facility]  Cardiovascular Medicine  [address] [place] [address], [address]  Available through call or text on [employer]  Office: [phone]   DATE OF SERVICE: 07-19-24 @ 16:36    Patient is a 78y old  Male who presents with a chief complaint of hypotension (19 Jul 2024 15:06)      INTERVAL HISTORY: Intubated and sedated    REVIEW OF SYSTEMS: Unable to participate in ROS  CONSTITUTIONAL: No weakness  EYES/ENT: No visual changes;  No throat pain   NECK: No pain or stiffness  RESPIRATORY: No cough, wheezing; No shortness of breath  CARDIOVASCULAR: No chest pain or palpitations  GASTROINTESTINAL: No abdominal  pain. No nausea, vomiting, or hematemesis  GENITOURINARY: No dysuria, frequency or hematuria  NEUROLOGICAL: No stroke like symptoms  SKIN: No rashes    TELEMETRY Personally reviewed: AF   	  MEDICATIONS:  aMIOdarone    Tablet 400 milliGRAM(s) Oral every 8 hours  aMIOdarone    Tablet   Oral   doxazosin 2 milliGRAM(s) Oral at bedtime  midodrine 30 milliGRAM(s) Oral every 8 hours  norepinephrine Infusion 0.05 MICROgram(s)/kG/Min IV Continuous <Continuous>  phenylephrine    Infusion 0.1 MICROgram(s)/kG/Min IV Continuous <Continuous>        PHYSICAL EXAM:  T(C): 38.2 (07-19-24 @ 15:00), Max: 38.7 (07-19-24 @ 06:00)  HR: 104 (07-19-24 @ 15:46) (89 - 122)  BP: 116/63 (07-19-24 @ 15:30) (76/50 - 149/113)  RR: 23 (07-19-24 @ 15:45) (23 - 36)  SpO2: 100% (07-19-24 @ 15:46) (73% - 100%)  Wt(kg): --  I&O's Summary    18 Jul 2024 07:01  -  19 Jul 2024 07:00  --------------------------------------------------------  IN: 3964.2 mL / OUT: 0 mL / NET: 3964.2 mL    19 Jul 2024 07:01  -  19 Jul 2024 16:36  --------------------------------------------------------  IN: 1180 mL / OUT: 1280 mL / NET: -100 mL          Appearance: In no distress	  HEENT:    PERRL, EOMI	  Cardiovascular:  S1 S2, No JVD  Respiratory: Lungs clear to auscultation	  Gastrointestinal:  Soft, Non-tender, + BS	  Vascularature:  No edema of LE  Psychiatric: Appropriate affect   Neuro: no acute focal deficits                               7.7    16.53 )-----------( 103      ( 19 Jul 2024 13:49 )             24.1     07-19    144  |  117<H>  |  54<H>  ----------------------------<  169<H>  3.9   |  14<L>  |  1.40<H>    Ca    7.5<L>      19 Jul 2024 06:31  Phos  4.0     07-19  Mg     1.7     07-19    TPro  4.3<L>  /  Alb  2.3<L>  /  TBili  0.8  /  DBili  x   /  AST  43<H>  /  ALT  35  /  AlkPhos  220<H>  07-19        Labs personally reviewed      ASSESSMENT/PLAN: 	      78-year-old male multiple medical problems history of hepatocellular carcinoma status post radiation therapy, AF s/p DCCV on Eliquis who was found to be hypotensive following NST. Patient has reported general weakness, fatigue, lightheadedness since being discharged from hospital. Reports mild chest discomfort in midsternal region. Reports dyspnea with minimal exertion. Also reports significant lack of appetite and poor PO intake. No dark or bloody stool, nausea or vomiting.       Problem/Plan - 1:  ·  Problem: Hypotension  ·  Plan: Continue with pressors in MICU  - Patient presents with hypotension and also RAZIA. Has known orthostatic hypotension.   - Patient and wife report decreased PO intake likely 2/2 malignancy.  - GIB 7/9, s/p 4 units prbc, IR for mesenteric embolization, now in MICU on pressors  - c/w Midodrine and IV pressors (wean as tolerated as per MICU)     Problem/Plan - 2:  ·  Problem: CAD.   ·  Plan: Recent PCI to pLAD in June 2023  - ECG non-ischemic  - Plavix held given large melena; resume as soon as possible given recent PCI     Problem/Plan - 3:  ·  Problem: Atrial Fibrillation  - s/p succesful DCCV 10/31  - Hold Eliquis 5mg BID given GIB  -  Started on Amio 400mg TID per ICU  - However would advise against chemical cardioversion off AC if possible              ROSIO Latham-ESTEFANI Hayes DO [facility]  Cardiovascular Medicine  [address] [place] [address], [address]  Available through call or text on [employer] TE[employer]  Office: [phone]

## 2024-07-19 NOTE — PROGRESS NOTE ADULT - SUBJECTIVE AND OBJECTIVE BOX
Patient is a 78y old  Male who presents with a chief complaint of hypotension (19 Jul 2024 10:00)    Patient seen and examined at bedside, intubated in MICU.     MEDICATIONS  (STANDING):  aMIOdarone    Tablet 400 milliGRAM(s) Oral every 8 hours  aMIOdarone    Tablet   Oral   atorvastatin 80 milliGRAM(s) Oral at bedtime  chlorhexidine 0.12% Liquid 15 milliLiter(s) Oral Mucosa every 12 hours  chlorhexidine 2% Cloths 1 Application(s) Topical <User Schedule>  dexMEDEtomidine Infusion 0.1 MICROgram(s)/kG/Hr (2.47 mL/Hr) IV Continuous <Continuous>  doxazosin 2 milliGRAM(s) Oral at bedtime  folic acid Injectable 1 milliGRAM(s) IV Push daily  insulin lispro (ADMELOG) corrective regimen sliding scale   SubCutaneous every 6 hours  meropenem  IVPB 1000 milliGRAM(s) IV Intermittent every 8 hours  meropenem  IVPB      midodrine 30 milliGRAM(s) Oral every 8 hours  mupirocin 2% Nasal 1 Application(s) Both Nostrils two times a day  norepinephrine Infusion 0.05 MICROgram(s)/kG/Min (9.25 mL/Hr) IV Continuous <Continuous>  pantoprazole Infusion 8 mG/Hr (10 mL/Hr) IV Continuous <Continuous>  phenylephrine    Infusion 0.1 MICROgram(s)/kG/Min (1.85 mL/Hr) IV Continuous <Continuous>  propofol Infusion 20 MICROgram(s)/kG/Min (11.8 mL/Hr) IV Continuous <Continuous>  sodium bicarbonate 1300 milliGRAM(s) Oral three times a day  thiamine Injectable 100 milliGRAM(s) IV Push daily  vancomycin    Solution 125 milliGRAM(s) Oral every 6 hours  vasopressin Infusion 0.04 Unit(s)/Min (6 mL/Hr) IV Continuous <Continuous>    MEDICATIONS  (PRN):    Vital Signs Last 24 Hrs  T(C): 38.3 (19 Jul 2024 11:00), Max: 38.7 (19 Jul 2024 06:00)  T(F): 100.9 (19 Jul 2024 11:00), Max: 101.7 (19 Jul 2024 06:00)  HR: 102 (19 Jul 2024 11:45) (89 - 122)  BP: 116/67 (19 Jul 2024 11:45) (76/50 - 129/63)  BP(mean): 86 (19 Jul 2024 11:45) (57 - 89)  RR: 27 (19 Jul 2024 11:45) (24 - 36)  SpO2: 100% (19 Jul 2024 11:45) (73% - 100%)    Parameters below as of 18 Jul 2024 20:00  Patient On (Oxygen Delivery Method): ventilator    O2 Concentration (%): 100    PE  NAD  Intubated  Anicteric, MMM  No c/c/e  No rash grossly                          7.7    16.53 )-----------( 103      ( 19 Jul 2024 13:49 )             24.1       07-19    144  |  117<H>  |  54<H>  ----------------------------<  169<H>  3.9   |  14<L>  |  1.40<H>    Ca    7.5<L>      19 Jul 2024 06:31  Phos  4.0     07-19  Mg     1.7     07-19    TPro  4.3<L>  /  Alb  2.3<L>  /  TBili  0.8  /  DBili  x   /  AST  43<H>  /  ALT  35  /  AlkPhos  220<H>  07-19

## 2024-07-19 NOTE — PROGRESS NOTE ADULT - CRITICAL CARE ATTENDING COMMENT
Patient critically ill requiring frequent bedside visits.    Patient is a 78M extensive history including CAD s/p stent (most recent 6/12/24), Afib on AC, orthostatic hypotension on midodrine, and pancreatic neuroendocrine tumor who presents with hypotension due to acute GI bleed. The patient has had worsening shock state and multiorgan failure.   --- Patient has had a deterioration on 7/18 with recurrent bleeding and acute blood loss anemia    #Neuro - lower sedation to monitor mental status  #Cardiovascular - shock state worsening and patient on 3 vasopressors this AM - lower as able with goal MAP > 65  - Monitor Hb - as patient has had recurrent hemorrhagic shock  - Hold Plavix for now  - RVR while on Levo - continue Amio PO load. Tachycardia also improved while on Propofol  - PRBC transfusion. Will give Platelets as well as patient has been on Plavix  - NURIA recently was off DAPT for few days in setting of bleeding but Plavix restarted 7/14. Given recurrent bleeding will stop Plavix. At this point patient has now had recurrent bleeding in setting of being on Plavix  --- On 6/12/24 patient had PCI with NURIA to large RPL and patent LAD stent  - Orthostatic hypotension - Midodrine and Droxidopa  - Restart statin  #Pulmonary - acute hypoxemic respiratory failure in setting of GI bleed  - Maintain O2 sat > 90%   - Extubated 7/15 but reintubated 7/18 for shock and GI bleed - continue mechanical ventilation and PSV as able  #Gastro - massive GIB s/p EGD x 3 and empiric GDA embolization on 7/9  - Most recent EGD 7/18 noted with duodenal ulcers and visible vessel which required interventions with successful cessation of bleeding  - C. diff positive and on PO Vanco - monitor diarrhea  - Continue PPI  - Transaminitis - RUQ sono noted  - Oropharyngeal dysphagia - OGT feeds  #Nephro - acute renal failure due to ATN now improved but still with signs of peripheral edema.   - Attempt diuresis today - IVC large on POCUS and getting significant volume with vasopressors  - Ivory catheter placed by Urology overnight  #Infectious Disease - Continue antibiotics in setting of shock state and fevers  - Monitor temperature curve and follow-up cultures - ETT culture with Klebsiella   - C. diff positive - PO Vanco - diarrhea reportedly improved today  #Endo - monitor FS and adjust insulin as needed for goal FS < 180  #Hem/Onc - pancreatic neuroendocrine tumor  - [place] [facility] [place]c seeing patient at [facility] there does appear to be progression of disease  #DVT proph - SCD  #GOC - Full Code    Prognosis guarded. MICU fellow spoke with patient's son at bedside this AM. Patient critically ill requiring frequent bedside visits.    Patient is a 78M extensive history including CAD s/p stent (most recent 6/12/24), Afib on AC, orthostatic hypotension on midodrine, and pancreatic neuroendocrine tumor who presents with hypotension due to acute GI bleed. The patient has had worsening shock state and multiorgan failure.   --- Patient has had a deterioration on 7/18 with recurrent bleeding and acute blood loss anemia    #Neuro - lower sedation to monitor mental status  #Cardiovascular - shock state worsening and patient on 3 vasopressors this AM - lower as able with goal MAP > 65  - Monitor Hb - as patient has had recurrent hemorrhagic shock  - Hold Plavix for now  - RVR while on Levo - continue Amio PO load. Tachycardia also improved while on Propofol  - PRBC transfusion. Will give Platelets as well as patient has been on Plavix  - NURIA recently was off DAPT for few days in setting of bleeding but Plavix restarted 7/14. Given recurrent bleeding will stop Plavix. At this point patient has now had recurrent bleeding in setting of being on Plavix  --- On 6/12/24 patient had PCI with NURIA to large RPL and patent LAD stent  - Orthostatic hypotension - continue Midodrine. Recently had added Droxidopa but will hold for now as patient back on higher doses of Levophed and restart when Levo requirements decreasing  - Restart statin  #Pulmonary - acute hypoxemic respiratory failure in setting of GI bleed  - Maintain O2 sat > 90%   - Extubated 7/15 but reintubated 7/18 for shock and GI bleed - continue mechanical ventilation and PSV as able  #Gastro - massive GIB s/p EGD x 3 and empiric GDA embolization on 7/9  - Most recent EGD 7/18 noted with duodenal ulcers and visible vessel which required interventions with successful cessation of bleeding  - C. diff positive and on PO Vanco - monitor diarrhea  - Continue PPI  - Transaminitis - RUQ sono noted  - Oropharyngeal dysphagia - OGT feeds  #Nephro - acute renal failure due to ATN now improved but still with signs of peripheral edema.   - Attempt diuresis today - IVC large on POCUS and getting significant volume with vasopressors  - Ivory catheter placed by Urology overnight  #Infectious Disease - Continue antibiotics in setting of shock state and fevers  - Monitor temperature curve and follow-up cultures - ETT culture with Klebsiella   - C. diff positive - PO Vanco - diarrhea reportedly improved today  #Endo - monitor FS and adjust insulin as needed for goal FS < 180  #Hem/Onc - pancreatic neuroendocrine tumor  - [place] [facility] [place] - per Onc seeing patient at [facility] there does appear to be progression of disease  #DVT proph - SCD  #GOC - Full Code    Prognosis guarded. MICU fellow spoke with patient's son at bedside this AM.

## 2024-07-19 NOTE — PROGRESS NOTE ADULT - SUBJECTIVE AND OBJECTIVE BOX
Interval Events:   - Underwent EGD yesterday for melena and found to have duodenal ulcer with active oozing and another ulcer with visible vessel. Clips and epi applied to actively oozing vessel and Nexpowder applied to both ulcers for hemostasis.   - He had two small episodes of melena overnight. No further episodes today  - Required no further transfusions since procedure. Hgb 8.2 this morning    ROS:   12 point review of systems performed and negative except otherwise noted in HPI.    Hospital Medications:  aMIOdarone    Tablet 400 milliGRAM(s) Oral every 8 hours  aMIOdarone    Tablet   Oral   atorvastatin 80 milliGRAM(s) Oral at bedtime  chlorhexidine 0.12% Liquid 15 milliLiter(s) Oral Mucosa every 12 hours  chlorhexidine 2% Cloths 1 Application(s) Topical <User Schedule>  dexMEDEtomidine Infusion 0.1 MICROgram(s)/kG/Hr IV Continuous <Continuous>  doxazosin 2 milliGRAM(s) Oral at bedtime  folic acid Injectable 1 milliGRAM(s) IV Push daily  insulin lispro (ADMELOG) corrective regimen sliding scale   SubCutaneous every 6 hours  meropenem  IVPB 1000 milliGRAM(s) IV Intermittent every 8 hours  meropenem  IVPB      midodrine 30 milliGRAM(s) Oral every 8 hours  mupirocin 2% Nasal 1 Application(s) Both Nostrils two times a day  norepinephrine Infusion 0.05 MICROgram(s)/kG/Min IV Continuous <Continuous>  pantoprazole Infusion 8 mG/Hr IV Continuous <Continuous>  phenylephrine    Infusion 0.1 MICROgram(s)/kG/Min IV Continuous <Continuous>  propofol Infusion 20 MICROgram(s)/kG/Min IV Continuous <Continuous>  sodium bicarbonate 1300 milliGRAM(s) Oral three times a day  thiamine Injectable 100 milliGRAM(s) IV Push daily  vancomycin    Solution 125 milliGRAM(s) Oral every 6 hours  vasopressin Infusion 0.04 Unit(s)/Min IV Continuous <Continuous>      PHYSICAL EXAM:   Vital Signs:  Vital Signs Last 24 Hrs  T(C): 38.3 (19 Jul 2024 11:00), Max: 38.7 (19 Jul 2024 06:00)  T(F): 100.9 (19 Jul 2024 11:00), Max: 101.7 (19 Jul 2024 06:00)  HR: 102 (19 Jul 2024 11:45) (89 - 122)  BP: 116/67 (19 Jul 2024 11:45) (76/50 - 129/63)  BP(mean): 86 (19 Jul 2024 11:45) (57 - 96)  RR: 27 (19 Jul 2024 11:45) (24 - 36)  SpO2: 100% (19 Jul 2024 11:45) (73% - 100%)    Parameters below as of 18 Jul 2024 20:00  Patient On (Oxygen Delivery Method): ventilator    O2 Concentration (%): 100  Daily     Daily     GENERAL: intubated, sedated  HEENT: NCAT, no conjunctival pallor appreciated  ABDOMEN: soft, moderately distended, tender to palpation  EXTREMITIES: cool to touch  SKIN: no lesions noted    LABS: reviewed                        9.1    14.00 )-----------( 84       ( 19 Jul 2024 06:31 )             26.6     07-19    144  |  117<H>  |  54<H>  ----------------------------<  169<H>  3.9   |  14<L>  |  1.40<H>    Ca    7.5<L>      19 Jul 2024 06:31  Phos  4.0     07-19  Mg     1.7     07-19    TPro  4.3<L>  /  Alb  2.3<L>  /  TBili  0.8  /  DBili  x   /  AST  43<H>  /  ALT  35  /  AlkPhos  220<H>  07-19    LIVER FUNCTIONS - ( 19 Jul 2024 06:31 )  Alb: 2.3 g/dL / Pro: 4.3 g/dL / ALK PHOS: 220 U/L / ALT: 35 U/L / AST: 43 U/L / GGT: x             Interval Diagnostic Studies: see sunrise for full report    Endoscopy  Findings:       LA Grade B (one or more mucosal breaks greater than 5 mm, not extending between the tops of        two mucosal folds) esophagitis with no bleeding was found.       The exam of the esophagus was otherwise normal.       The stomach was normal.       Diffuse erosions and ulceration seen throughout the duodenum with two cratered ulcers seen.        One cratered ulcer with flat pigmented spot was seen in the bulb/sweep of the duodenum. A        second ulcer was seen in 2nd partof duodenum with visible vessel with active bleeding. For        this lesion, To stop active bleeding, three hemostatic clips were successfully placed (MR        conditional) as well as 4 mL of a 1:10,000 solution of epinephrine injection for hemostasis.        After this, nexpowder spray was deployed across both areas of ulceration. There was no        bleeding at the end of the procedure.       The exam of the duodenum was otherwise normal.                                                  Impression:          - LA Grade B esophagitis.                       - DIffuse ulceration throughout duodenum. 1 cratered ulcer with flat                        pigmented spot. 1 cratered ulcer with bleeding visible vessel. Hemostasis                        achieved via clips/epinephrine. Nexpowder applied to both ulcerations at end                        of procedure

## 2024-07-19 NOTE — PROGRESS NOTE ADULT - ASSESSMENT
Assessment	  Assessment: 79 yo male with metastatic neuroendocrine pancreatic cancer (tx on hold) and PMH of CAD s/p PCI x3 with most recent stent 6/12/24 on Plavix and eliquis , chronic Afib on eliquis, orthostatic hypotension on midodrine, PAD , recent admission for dx cath on 6/24/24 presented from PCP's office for hypotension despite taking AM midodrine dose on 7/2, admitted to medicine for symptomatic hypotension and RAZIA. Per chart, on 7/8 PM, pt was noted to have acute onset of melena x5 and coffee ground emesis x2-3. RRT was called on 7/9 AM for hypotension, hgb dropped from 9.2 to 7.4 (admission baseline 10-11), FOBT +, pt was started on PPI IV and transfused 1 unit of pRBC. GI was consulted and underwent EGD on 7/9 afternoon. MICU was consulted for uncontrolled bleeding during EGD. During EGD, pt became hypotensive and was given phenylephrine, had A-fib with RVR s/p amiodarone. Now s/p IR GDA embolization, admitted to MICU for further moniring i.s.o hemorrhagic shock 2/2 GI bleed. On repeat EGD, oozing but no active bleeding was noted. Hospital course c/b c.diff, and patient has been placed on vancomycin. Patient had melanotic stool 7/18 with Hgb drop 10.1 to 7.1 and received 2 units pRBC and 1 unit platelets. Patient was reintubated and underwent EGD 7/18 afternoon. GI found duodenal bleed. Patient now s/p 3x clips and epi.    Plan:     NEUROLOGIC  # Baseline AOx3  Course:  - intubated 7/9  - weaned off propofol onto precedex  - extubated 7/15  - reintubated 7/18 for EGD  Plan:  - Restarted propofol for sedation  - Discuss length of intubation tomorrow  - fentanyl prn for pain control (last dose 7/12/24)    CARDIOVASCULAR  # hemorrhagic shock   Course:  - RRT called 7/9 for hypotension   - 2/2 to Upper GI bleeds, urgently took to EGD, found bleeding ulcer that was not well controlled, underwent IR empiric embolization (7/9)  - s/p 5 pRBC prior to MICU, and 4 pRBC, 2 FFP, 2 platelets in MICU (7/17)  - Restarted plavix 7/13/24 for new stent (6/12/24) per GI  - on henny, maintain MAP > 65; took off vaso and precedex (7/13/24), he got tachy to 118. Added precedex back. vasopressin added 7/14  - per spouse, pt's baseline BP is a little bit over 100  - Albumin 5%  mL given 7/16  - Vasopressin stopped (7/17)  - On 7/18, patient had large melanotic stool with Hgb drop from 10.1-7.1, patient was reintubated for GI scope  - s/p 2pRBC, 1 platelet (7/18)  - total transfusions: 11pRBC, 3 platelet  Findings:  - AM cortisol 17 (7/5), AM cortisol 37.1 (7/11)  - POCUS ECHO (7/16) showed no cardiogenic shock, no tamponade, low SV indeterminate IVC, irregular B lines but not concerning for pulmonary edema, and some subdiaphragmatic fluid  Plan:  - Continue to monitor CBC  - Transfuse as needed  - Continue henny, try to wean (goal MAP >65), restarted vaso for intubation  - Plavix 75 mg restarted  - IVF if not volume overloaded given recent stool patterns  - Continue midodrine 30mg q8  - Continue droxidopa 300mg q8     #CAD s/p PCI x3, most recent stent 6/12/24, NURIA to pLAD  Course:  - restarted plavix 7/13/24 for new stent (6/12/24) per GI   Plan:  - Plavix 75 mg restarted  - continue to hold statin because of elevated LFTs    #PAD  # A-fib on eliquis  May have been worsened because of GI bleed  - s/p successful DCCV 10/31   - In RVR 7/16  - Cards recommended resuming home dose sotalol 40 mg PO (qTC wnl)  - s/p 1 dose sotalol but RVR persisted  - s/p 2 doses of amio overnight (7/16)  - attempted to restart home sotalol, but patient HR returned to 150s in afternoon (7/16)  - Amio restarted amio 150 mg followed by loading dose of 1 mg/min for 6 hours and 0.5 mg/min for 18 hours (7/16)  - received amio 150 overnight (7/17)  Plan:  - Hold eliquis   - Continue amio 400 mg q8 for 12 doses followed by 200 mg daily (first 400mg dose 1300 on 7/17)  - Hold home sotalol  - Cardiology following    PULMONARY  #Intubated for airway protection prior to EGD/IR embolization  Course:  - Intubated 7/9  - Extubated 7/15  - Intubated again 7/18  Plan:  - Continue intubation pending GI scope today and any further interventions necessary    #Sputum production  - Patient complaining of cough and phlegm  - Patient able to self-suction (7/17)  Plan:  - Currently intubated  - Airway clearance protocol    RENAL/  #RAZIA   #ATN i.s.o hypotension  #metabolic acidosis   Findings:  - pH 7.37 (7/16)  - Cr 1.53 (7/16)  - AG 18 (7/17) with high glucose, concern for DKA but ABG pH 7.39, pCO2 26 pO2 87, pHCO3 16, Beta hydroxybutyrate 0 (7/17)  - AG 13 (7/18)  Plan:  - if has AG again, ABG and if acidotic, consider BiPAP  - f/u nephro recs    - consider diuresis for volume overload pending nephro recs, but in light of recent BM pattern, hold off  - Continue sodium bicarb 1300 mg tid, added per nephro  - trend BUN/Cr   - monitor Is and Os     #Urinary retention  Plan:  - Started Doxazosin 2 mg daily (7/15)  - Monitor I/Os    GASTROINTESTINAL  #Upper GI Bleed  Course:  - s/p 5 units pRBC prior to MICU and then 4:2:2 on 7/12  - CT A/P 7/9 - Active bleeding in the third portion of the duodenum. Progression of liver metastases.  - EGD 7/9 showed esophagitis, One non-bleeding duodenal ulcer with a clean ulcer base (Arjun Class III). One oozing duodenal ulcer with spurting hemorrhage (Arjun Class Ia). Treatment not successful. Treated with bipolar cautery.  - s/p IR embolization on 7/9  - Patient had melanotic stool overnight (7/18) and hemoglobin drop 10.1 to 7.1 in 24 hours  - Reached out to surgery in case of recurrent bleeding per GI if unable to find/contain source during re-scope  - s/p EGD 7/19 showing duodenal bleed s/p 3 clips + epi  - Total EGD: 3  Plan:  - per GI recs, changed PPI gtt to PPI IV BID  - monitor Hgb, transfuse as needed   - hold eliquis   - continue to f/u with GI and IR     - Per GI, "Will need PPI BID for 8 weeks for grade D esophagitis and PUD"  - Follow up GI final recs after procedure    #Nutrition  Course:  - placed OG tube per GI and started ensure clear liquid (see note from RD 7/13/24)  - OG tube removed during extubation (7/15/24)  -dysphagia screen failed  - FEES (7/17) failed  - ENT consulted for vallecular lesion, diagnosed vallecular cyst, no inpatient intervention required, outpatient f/u  Plan:  - OG tube in place  - Diet per RD recs (placed 7/17)  - Speech and swallow to reevaluate overall status improves    #Diarrhea  Course:  - stopped maintenance fluids   - positive for c. diff (7/14)  - started vancomycin (7/14 -)   - having worsening diarrhea, no melena (7/16)  Plan:  - Continue vancomycin  - Fluids as necessary to compensate for volume loss 2/2 diarrhea    #Transaminitis  Findings:  - Bili 1.1 (7/16) -> 1.3 (7/17)  - Alk P 359 (7/16)-> 484 (7/17)  -  (7/16)->129 (7/17)  - ALT 67 (7/16)-> 72 (7/17)  - RUQ US (7/17): known liver mets, no acute findings  Plan:  - continue to hold statin    INFECTIOUS DISEASE  #leukocytosis  Findings/Course:  -  BCx 7/10 - NGTD  - Sputum Cx from ETT 7/13 showed numerous gram neg krishna (Klebsiella oxytoca/raoultella ornithinolytica)  - Started zosyn for empiric treatment (7/11 - 7/18)   - GI panel: previously indeterminate norovirus (7/8), negative (7/13)     - per ID- no intervention needed regarding the PCR results  - c. diff + (7/14)  - started vancomycin for c. diff (7/14- )  - febrile 7/16 overnight  - currently afebrile 7/17  - MRSA Swab: negative  - Staph aureus PCR positive  - Culture resistant to Zosyn (7/18)  - Switched Zosyn to Meropenem (7/18- )  Plan:  - Continue vancomycin 125 mg q6 for c. diff  - Switched to meropenem 1000 mg q8  - If persistent fevers, consider escalating vancomycin to 250mg q6 or adding flagyl  - If febrile again, send cultures  - Continue to monitor for fevers    ENDOCRINE  #adrenal insufficiency   Findings:  -7/5 cortisol 17   - 7/11 cortisol 37.1     HEMATOLOGY/ONCOLOGY   # neuroendocrine tumor   - was on lanreotide then had POD in liver and started on peptide receptor radiotherapy (PRRT)- typically given every 8 weeks for 4 doses. He last received it 10/20/2023   - PET 5/28/24 shows new somatostatin avid osseous lesions; decreased intensely avid right supradiaphragmatic and upper abdominal nodes, suspicious for mets  Plan:  - per heme/onc:    - can resume octreotide 150 mcg bid once infection r/o      - can check factor levels V, VIII, X     - "-- [place] [facility] [place] no plan for treatment inpatient, if clinically improves/stabilizes then can be considered for treatment outpatient"    #DVT ppx  - SCDs  - LE duplex (7/17): negative for DVT    SKINS:   - Lines/Tubes - PIV, cordis replaced with central line (7/14)    Ethics:   - Full Code   - GOC 7/12, spoke to spouse (Yamilet) over phone with palliative care team, discussed GOC again on 7/16    Consults this admission: GI, Heme Onc, Palliative, Endocrinology, Cardiology, Nephrology Assessment	  Assessment: 79 yo male with metastatic neuroendocrine pancreatic cancer (tx on hold) and PMH of CAD s/p PCI x3 with most recent stent 6/12/24 on Plavix and eliquis , chronic Afib on eliquis, orthostatic hypotension on midodrine, PAD , recent admission for dx cath on 6/24/24 presented from PCP's office for hypotension despite taking AM midodrine dose on 7/2, admitted to medicine for symptomatic hypotension and RAZIA. Per chart, on 7/8 PM, pt was noted to have acute onset of melena x5 and coffee ground emesis x2-3. RRT was called on 7/9 AM for hypotension, hgb dropped from 9.2 to 7.4 (admission baseline 10-11), FOBT +, pt was started on PPI IV and transfused 1 unit of pRBC. GI was consulted and underwent EGD on 7/9 afternoon. MICU was consulted for uncontrolled bleeding during EGD. During EGD, pt became hypotensive and was given phenylephrine, had A-fib with RVR s/p amiodarone. Now s/p IR GDA embolization, admitted to MICU for further moniring i.s.o hemorrhagic shock 2/2 GI bleed. On repeat EGD, oozing but no active bleeding was noted. Hospital course c/b c.diff, and patient has been placed on vancomycin. Patient had melanotic stool 7/18 with Hgb drop 10.1 to 7.1 and received 2 units pRBC and 1 unit platelets. Patient was reintubated and underwent EGD 7/18 afternoon. GI found duodenal bleed. Patient now s/p 3x clips and epi.    Plan:     NEUROLOGIC  # Baseline AOx3  Course:  - intubated 7/9  - weaned off propofol onto precedex  - extubated 7/15  - reintubated 7/18 for EGD  Plan:  - Patient not currently sedated  - fentanyl prn for pain control (last dose 7/12/24)    CARDIOVASCULAR  # hemorrhagic shock   Course:  - RRT called 7/9 for hypotension   - 2/2 to Upper GI bleeds, urgently took to EGD, found bleeding ulcer that was not well controlled, underwent IR empiric embolization (7/9)  - s/p 5 pRBC prior to MICU, and 4 pRBC, 2 FFP, 2 platelets in MICU (7/17)  - Restarted plavix 7/13/24 for new stent (6/12/24) per GI  - on henny, maintain MAP > 65; took off vaso and precedex (7/13/24), he got tachy to 118. Added precedex back. vasopressin added 7/14  - per spouse, pt's baseline BP is a little bit over 100  - Albumin 5%  mL given 7/16  - Vasopressin stopped (7/17)  - On 7/18, patient had large melanotic stool with Hgb drop from 10.1-7.1, patient was reintubated for GI scope  - s/p 2pRBC, 1 platelet (7/18)  - total transfusions: 11pRBC, 3 platelet  Findings:  - AM cortisol 17 (7/5), AM cortisol 37.1 (7/11)  - POCUS ECHO (7/16) showed no cardiogenic shock, no tamponade, low SV indeterminate IVC, irregular B lines but not concerning for pulmonary edema, and some subdiaphragmatic fluid  Plan:  - Continue to monitor CBC  - Transfuse as needed  - Continue henny, try to wean (goal MAP >65), restarted vaso for intubation  - Plavix 75 mg restarted  - IVF if not volume overloaded given recent stool patterns  - Continue midodrine 30mg q8  - Continue droxidopa 300mg q8     #CAD s/p PCI x3, most recent stent 6/12/24, NURIA to pLAD  Course:  - restarted plavix 7/13/24 for new stent (6/12/24) per GI   Plan:  - Plavix 75 mg restarted  - continue to hold statin because of elevated LFTs    #PAD  # A-fib on eliquis  May have been worsened because of GI bleed  - s/p successful DCCV 10/31   - In RVR 7/16  - Cards recommended resuming home dose sotalol 40 mg PO (qTC wnl)  - s/p 1 dose sotalol but RVR persisted  - s/p 2 doses of amio overnight (7/16)  - attempted to restart home sotalol, but patient HR returned to 150s in afternoon (7/16)  - Amio restarted amio 150 mg followed by loading dose of 1 mg/min for 6 hours and 0.5 mg/min for 18 hours (7/16)  - received amio 150 overnight (7/17)  Plan:  - Hold eliquis   - Continue amio 400 mg q8 for 12 doses followed by 200 mg daily (first 400mg dose 1300 on 7/17)  - Hold home sotalol  - Cardiology following    PULMONARY  #Intubated for airway protection prior to EGD/IR embolization  Course:  - Intubated 7/9  - Extubated 7/15  - Intubated again 7/18  Plan:  - Patient placed on CPAP  - SBT to evaluate for extubation later today    #Sputum production  - Patient complaining of cough and phlegm  - Patient able to self-suction (7/17)  Plan:  - Currently intubated  - Airway clearance protocol    RENAL/  #RAZIA   #ATN i.s.o hypotension  #metabolic acidosis   Findings:  - pH 7.37 (7/16)  - Cr 1.53 (7/16)  - AG 18 (7/17) with high glucose, concern for DKA but ABG pH 7.39, pCO2 26 pO2 87, pHCO3 16, Beta hydroxybutyrate 0 (7/17)  - AG 13 (7/18)  Plan:  - if has AG again, ABG and if acidotic, consider BiPAP  - f/u nephro recs    - consider diuresis for volume overload pending nephro recs, but in light of recent BM pattern, hold off  - Continue sodium bicarb 1300 mg tid, added per nephro  - trend BUN/Cr   - monitor Is and Os     #Urinary retention  Plan:  - Started Doxazosin 2 mg daily (7/15)  - Monitor I/Os  - Ivory placed today by urology, making urine    GASTROINTESTINAL  #Upper GI Bleed  Course:  - s/p 5 units pRBC prior to MICU and then 4:2:2 on 7/12  - CT A/P 7/9 - Active bleeding in the third portion of the duodenum. Progression of liver metastases.  - EGD 7/9 showed esophagitis, One non-bleeding duodenal ulcer with a clean ulcer base (Arjun Class III). One oozing duodenal ulcer with spurting hemorrhage (Arjun Class Ia). Treatment not successful. Treated with bipolar cautery.  - s/p IR embolization on 7/9  - Patient had melanotic stool overnight (7/18) and hemoglobin drop 10.1 to 7.1 in 24 hours  - Reached out to surgery in case of recurrent bleeding per GI if unable to find/contain source during re-scope  - s/p EGD 7/19 showing duodenal bleed s/p 3 clips + epi  - Total EGD: 3  - Bloody BM 7/19 likely old blood  Plan:  - per GI recs, changed PPI gtt to PPI IV BID  - monitor Hgb, transfuse as needed   - hold eliquis   - continue to f/u with GI and IR     - Per GI, "Will need PPI BID for 8 weeks for grade D esophagitis and PUD"  - Follow up GI final recs after procedure    #Nutrition  Course:  - placed OG tube per GI and started ensure clear liquid (see note from RD 7/13/24)  - OG tube removed during extubation (7/15/24)  -dysphagia screen failed  - FEES (7/17) failed  - ENT consulted for vallecular lesion, diagnosed vallecular cyst, no inpatient intervention required, outpatient f/u  Plan:  - OG tube in place  - Diet per RD recs (placed 7/17)  - Speech and swallow to reevaluate overall status improves    #Diarrhea  Course:  - stopped maintenance fluids   - positive for c. diff (7/14)  - started vancomycin (7/14 -)   - having worsening diarrhea, no melena (7/16)  Plan:  - Continue vancomycin  - Fluids as necessary to compensate for volume loss 2/2 diarrhea    #Transaminitis  Findings:  - Bili 1.1 (7/16) -> 1.3 (7/17)  - Alk P 359 (7/16)-> 484 (7/17)  -  (7/16)->129 (7/17)  - ALT 67 (7/16)-> 72 (7/17)  - RUQ US (7/17): known liver mets, no acute findings  Plan:  - continue to hold statin    INFECTIOUS DISEASE  #leukocytosis  Findings/Course:  -  BCx 7/10 - NGTD  - Sputum Cx from ETT 7/13 showed numerous gram neg krishna (Klebsiella oxytoca/raoultella ornithinolytica)  - Started zosyn for empiric treatment (7/11 - 7/18)   - GI panel: previously indeterminate norovirus (7/8), negative (7/13)     - per ID- no intervention needed regarding the PCR results  - c. diff + (7/14)  - started vancomycin for c. diff (7/14- )  - febrile 7/16 overnight  - currently afebrile 7/17  - MRSA Swab: negative  - Staph aureus PCR positive  - Culture resistant to Zosyn (7/18)  - Switched Zosyn to Meropenem (7/18- )  - Patient febrile overnight (7/19)  Plan:  - Continue vancomycin 125 mg q6 for c. diff  - Switched to meropenem 1000 mg q8  - Resent cultures  - If persistent fevers, consider escalating vancomycin to 250mg q6 or adding flagyl  - Continue to monitor for fevers    ENDOCRINE  #adrenal insufficiency   Findings:  -7/5 cortisol 17   - 7/11 cortisol 37.1     HEMATOLOGY/ONCOLOGY   # neuroendocrine tumor   - was on lanreotide then had POD in liver and started on peptide receptor radiotherapy (PRRT)- typically given every 8 weeks for 4 doses. He last received it 10/20/2023   - PET 5/28/24 shows new somatostatin avid osseous lesions; decreased intensely avid right supradiaphragmatic and upper abdominal nodes, suspicious for mets  Plan:  - per heme/onc:    - can resume octreotide 150 mcg bid once infection r/o      - can check factor levels V, VIII, X     - "-- f/u with Dr. Sophia Prasad at [facility] [place] after [place], no plan for treatment inpatient, if clinically improves/stabilizes then can be considered for treatment outpatient"    #DVT ppx  - SCDs  - LE duplex (7/17): negative for DVT    SKINS:   - Lines/Tubes - PIV, cordis replaced with central line (7/14)    Ethics:   - Full Code   - GOC 7/12, spoke to spouse (Yamilet) over phone with palliative care team, discussed GOC again on 7/16    Consults this admission: GI, Heme Onc, Palliative, Endocrinology, Cardiology, Nephrology Assessment	  Assessment: 77 yo male with metastatic neuroendocrine pancreatic cancer (tx on hold) and PMH of CAD s/p PCI x3 with most recent stent 6/12/24 on Plavix and eliquis , chronic Afib on eliquis, orthostatic hypotension on midodrine, PAD , recent admission for dx cath on 6/24/24 presented from PCP's office for hypotension despite taking AM midodrine dose on 7/2, admitted to medicine for symptomatic hypotension and RAZIA. Per chart, on 7/8 PM, pt was noted to have acute onset of melena x5 and coffee ground emesis x2-3. RRT was called on 7/9 AM for hypotension, hgb dropped from 9.2 to 7.4 (admission baseline 10-11), FOBT +, pt was started on PPI IV and transfused 1 unit of pRBC. GI was consulted and underwent EGD on 7/9 afternoon. MICU was consulted for uncontrolled bleeding during EGD. During EGD, pt became hypotensive and was given phenylephrine, had A-fib with RVR s/p amiodarone. Now s/p IR GDA embolization, admitted to MICU for further moniring i.s.o hemorrhagic shock 2/2 GI bleed. On repeat EGD, oozing but no active bleeding was noted. Hospital course c/b c.diff, and patient has been placed on vancomycin. Patient had melanotic stool 7/18 with Hgb drop 10.1 to 7.1 and received 2 units pRBC and 1 unit platelets. Patient was reintubated and underwent EGD 7/18 afternoon. GI found duodenal bleed. Patient now s/p 3x clips and epi.    Plan:     NEUROLOGIC  # Baseline AOx3  Course:  - intubated 7/9  - weaned off propofol onto precedex  - extubated 7/15  - reintubated 7/18 for EGD  Plan:  - Patient not currently sedated  - fentanyl prn for pain control (last dose 7/12/24)    CARDIOVASCULAR  # hemorrhagic shock   Course:  - RRT called 7/9 for hypotension   - 2/2 to Upper GI bleeds, urgently took to EGD, found bleeding ulcer that was not well controlled, underwent IR empiric embolization (7/9)  - s/p 5 pRBC prior to MICU, and 4 pRBC, 2 FFP, 2 platelets in MICU (7/17)  - Restarted plavix 7/13/24 for new stent (6/12/24) per GI  - on henny, maintain MAP > 65; took off vaso and precedex (7/13/24), he got tachy to 118. Added precedex back. vasopressin added 7/14  - per spouse, pt's baseline BP is a little bit over 100  - Albumin 5%  mL given 7/16  - Vasopressin stopped (7/17)  - On 7/18, patient had large melanotic stool with Hgb drop from 10.1-7.1, patient was reintubated for GI scope  - s/p 2pRBC, 1 platelet (7/18)  - total transfusions: 11pRBC, 3 platelet  Findings:  - AM cortisol 17 (7/5), AM cortisol 37.1 (7/11)  - POCUS ECHO (7/16) showed no cardiogenic shock, no tamponade, low SV indeterminate IVC, irregular B lines but not concerning for pulmonary edema, and some subdiaphragmatic fluid  Plan:  - Continue to monitor CBC  - Transfuse as needed  - Continue henny, try to wean (goal MAP >65), restarted vaso for intubation  - Plavix 75 mg restarted  - IVF if not volume overloaded given recent stool patterns  - Continue midodrine 30mg q8  - Continue droxidopa 300mg q8     #CAD s/p PCI x3, most recent stent 6/12/24, NURIA to pLAD  Course:  - restarted plavix 7/13/24 for new stent (6/12/24) per GI   Plan:  - Plavix 75 mg restarted  - continue to hold statin because of elevated LFTs    #PAD  # A-fib on eliquis  May have been worsened because of GI bleed  - s/p successful DCCV 10/31   - In RVR 7/16  - Cards recommended resuming home dose sotalol 40 mg PO (qTC wnl)  - s/p 1 dose sotalol but RVR persisted  - s/p 2 doses of amio overnight (7/16)  - attempted to restart home sotalol, but patient HR returned to 150s in afternoon (7/16)  - Amio restarted amio 150 mg followed by loading dose of 1 mg/min for 6 hours and 0.5 mg/min for 18 hours (7/16)  - received amio 150 overnight (7/17)  Plan:  - Hold eliquis   - Continue amio 400 mg q8 for 12 doses followed by 200 mg daily (first 400mg dose 1300 on 7/17)  - Hold home sotalol  - Cardiology following    PULMONARY  #Intubated for airway protection prior to EGD/IR embolization  Course:  - Intubated 7/9  - Extubated 7/15  - Intubated again 7/18  Plan:  - Patient placed on CPAP  - SBT to evaluate for extubation later today    #Sputum production  - Patient complaining of cough and phlegm  - Patient able to self-suction (7/17)  Plan:  - Currently intubated  - Airway clearance protocol    RENAL/  #RAZIA   #ATN i.s.o hypotension  #metabolic acidosis   Findings:  - pH 7.37 (7/16)  - Cr 1.53 (7/16)  - AG 18 (7/17) with high glucose, concern for DKA but ABG pH 7.39, pCO2 26 pO2 87, pHCO3 16, Beta hydroxybutyrate 0 (7/17)  - AG 13 (7/18)  Plan:  - if has AG again, ABG and if acidotic, consider BiPAP  - f/u nephro recs    - consider diuresis for volume overload pending nephro recs, but in light of recent BM pattern, hold off  - Continue sodium bicarb 1300 mg tid, added per nephro  - trend BUN/Cr   - monitor Is and Os     #Urinary retention  Plan:  - Started Doxazosin 2 mg daily (7/15)  - Monitor I/Os  - Ivory placed today by urology, making urine    GASTROINTESTINAL  #Upper GI Bleed  Course:  - s/p 5 units pRBC prior to MICU and then 4:2:2 on 7/12  - CT A/P 7/9 - Active bleeding in the third portion of the duodenum. Progression of liver metastases.  - EGD 7/9 showed esophagitis, One non-bleeding duodenal ulcer with a clean ulcer base (Arjun Class III). One oozing duodenal ulcer with spurting hemorrhage (Arjun Class Ia). Treatment not successful. Treated with bipolar cautery.  - s/p IR embolization on 7/9  - Patient had melanotic stool overnight (7/18) and hemoglobin drop 10.1 to 7.1 in 24 hours  - Reached out to surgery in case of recurrent bleeding per GI if unable to find/contain source during re-scope  - s/p EGD 7/19 showing duodenal bleed s/p 3 clips + epi  - Total EGD: 3  - Bloody BM 7/19 likely old blood  Plan:  - per GI recs, changed PPI gtt to PPI IV BID  - monitor Hgb, transfuse as needed   - hold eliquis   - continue to f/u with GI and IR     - Per GI, "Will need PPI BID for 8 weeks for grade D esophagitis and PUD"  - Follow up GI final recs after procedure    #Nutrition  Course:  - placed OG tube per GI and started ensure clear liquid (see note from RD 7/13/24)  - OG tube removed during extubation (7/15/24)  -dysphagia screen failed  - FEES (7/17) failed  - ENT consulted for vallecular lesion, diagnosed vallecular cyst, no inpatient intervention required, outpatient f/u  Plan:  - OG tube in place  - Diet per RD recs (placed 7/17)  - Speech and swallow to reevaluate overall status improves    #Diarrhea  Course:  - stopped maintenance fluids   - positive for c. diff (7/14)  - started vancomycin (7/14 -)   - having worsening diarrhea, no melena (7/16)  Plan:  - Continue vancomycin  - Fluids as necessary to compensate for volume loss 2/2 diarrhea    #Transaminitis  Findings:  - Bili 1.1 (7/16) -> 1.3 (7/17)  - Alk P 359 (7/16)-> 484 (7/17)  -  (7/16)->129 (7/17)  - ALT 67 (7/16)-> 72 (7/17)  - RUQ US (7/17): known liver mets, no acute findings  Plan:  - continue to hold statin    INFECTIOUS DISEASE  #leukocytosis  Findings/Course:  -  BCx 7/10 - NGTD  - Sputum Cx from ETT 7/13 showed numerous gram neg krishna (Klebsiella oxytoca/raoultella ornithinolytica)  - Started zosyn for empiric treatment (7/11 - 7/18)   - GI panel: previously indeterminate norovirus (7/8), negative (7/13)     - per ID- no intervention needed regarding the PCR results  - c. diff + (7/14)  - started vancomycin for c. diff (7/14- )  - febrile 7/16 overnight  - currently afebrile 7/17  - MRSA Swab: negative  - Staph aureus PCR positive  - Culture resistant to Zosyn (7/18)  - Switched Zosyn to Meropenem (7/18- )  - Patient febrile overnight (7/19)  Plan:  - Continue vancomycin 125 mg q6 for c. diff  - Switched to meropenem 1000 mg q8  - Resent cultures  - If persistent fevers, consider escalating vancomycin to 250mg q6 or adding flagyl  - Continue to monitor for fevers    ENDOCRINE  #adrenal insufficiency   Findings:  -7/5 cortisol 17   - 7/11 cortisol 37.1     HEMATOLOGY/ONCOLOGY   # neuroendocrine tumor   - was on lanreotide then had POD in liver and started on peptide receptor radiotherapy (PRRT)- typically given every 8 weeks for 4 doses. He last received it 10/20/2023   - PET 5/28/24 shows new somatostatin avid osseous lesions; decreased intensely avid right supradiaphragmatic and upper abdominal nodes, suspicious for mets  Plan:  - per heme/onc:    - can resume octreotide 150 mcg bid once infection r/o      - can check factor levels V, VIII, X     - "-- f/u with Dr. Sophia Prasad at [facility] [place] after discharge, no plan for treatment inpatient, if clinically improves/stabilizes then can be considered for treatment outpatient"    #DVT ppx  - SCDs  - LE duplex (7/17): negative for DVT    SKINS:   - Lines/Tubes - PIV, cordis replaced with central line (7/14)  Plan:  - Replace central line?  - A line placement?    Ethics:   - Full Code   - GOC 7/12, spoke to spouse (Yamilet) over phone with palliative care team, discussed GOC again on 7/16    Consults this admission: GI, Heme Onc, Palliative, Endocrinology, Cardiology, Nephrology Assessment	  Assessment: 79 yo male with metastatic neuroendocrine pancreatic cancer (tx on hold) and PMH of CAD s/p PCI x3 with most recent stent 6/12/24 on Plavix and eliquis , chronic Afib on eliquis, orthostatic hypotension on midodrine, PAD , recent admission for dx cath on 6/24/24 presented from PCP's office for hypotension despite taking AM midodrine dose on 7/2, admitted to medicine for symptomatic hypotension and RAZIA. Per chart, on 7/8 PM, pt was noted to have acute onset of melena x5 and coffee ground emesis x2-3. RRT was called on 7/9 AM for hypotension, hgb dropped from 9.2 to 7.4 (admission baseline 10-11), FOBT +, pt was started on PPI IV and transfused 1 unit of pRBC. GI was consulted and underwent EGD on 7/9 afternoon. MICU was consulted for uncontrolled bleeding during EGD. During EGD, pt became hypotensive and was given phenylephrine, had A-fib with RVR s/p amiodarone. Now s/p IR GDA embolization, admitted to MICU for further moniring i.s.o hemorrhagic shock 2/2 GI bleed. On repeat EGD, oozing but no active bleeding was noted. Hospital course c/b c.diff, and patient has been placed on vancomycin. Patient had melanotic stool 7/18 with Hgb drop 10.1 to 7.1 and received 2 units pRBC and 1 unit platelets. Patient was reintubated and underwent EGD 7/18 afternoon. GI found duodenal bleed. Patient now s/p 3x clips and epi.    Plan:     NEUROLOGIC  # Baseline AOx3  Course:  - intubated 7/9  - weaned off propofol onto precedex  - extubated 7/15  - reintubated 7/18 for EGD  Plan:  - Patient currently on precedex  - fentanyl prn for pain control (last dose 7/12/24)    CARDIOVASCULAR  # hemorrhagic shock   Course:  - RRT called 7/9 for hypotension   - 2/2 to Upper GI bleeds, urgently took to EGD, found bleeding ulcer that was not well controlled, underwent IR empiric embolization (7/9)  - s/p 5 pRBC prior to MICU, and 4 pRBC, 2 FFP, 2 platelets in MICU (7/17)  - Restarted plavix 7/13/24 for new stent (6/12/24) per GI  - on henny, maintain MAP > 65; took off vaso and precedex (7/13/24), he got tachy to 118. Added precedex back. vasopressin added 7/14  - per spouse, pt's baseline BP is a little bit over 100  - Albumin 5%  mL given 7/16  - Vasopressin stopped (7/17)  - On 7/18, patient had large melanotic stool with Hgb drop from 10.1-7.1, patient was reintubated for GI scope  - s/p 2pRBC, 1 platelet (7/18)  - total transfusions: 11pRBC, 3 platelet  Findings:  - AM cortisol 17 (7/5), AM cortisol 37.1 (7/11)  - POCUS ECHO (7/16) showed no cardiogenic shock, no tamponade, low SV indeterminate IVC, irregular B lines but not concerning for pulmonary edema, and some subdiaphragmatic fluid  Plan:  - Continue to monitor CBC  - Transfuse as needed  - Continue henny, levo, and vaso, try to wean (goal MAP >65)  - Continue to hold Plavix 75 mg  - IVF if not volume overloaded given recent stool patterns  - Continue midodrine 30mg q8  - D/c droxidopa     #CAD s/p PCI x3, most recent stent 6/12/24, NURIA to pLAD  Course:  - restarted plavix 7/13/24 for new stent (6/12/24) per GI   Plan:  - Continue to hold Plavix 75 mg  - Restart atorvastatin 80 mg daily    #PAD  # A-fib on eliquis  May have been worsened because of GI bleed  - s/p successful DCCV 10/31   - In RVR 7/16  - Cards recommended resuming home dose sotalol 40 mg PO (qTC wnl)  - s/p 1 dose sotalol but RVR persisted  - s/p 2 doses of amio overnight (7/16)  - attempted to restart home sotalol, but patient HR returned to 150s in afternoon (7/16)  - Amio restarted amio 150 mg followed by loading dose of 1 mg/min for 6 hours and 0.5 mg/min for 18 hours (7/16)  - received amio 150 overnight (7/17)  Plan:  - Hold eliquis   - Continue amio 400 mg q8 for 12 doses followed by 200 mg daily (first 400mg dose 1300 on 7/17)  - Hold home sotalol  - Cardiology following    PULMONARY  #Intubated for airway protection prior to EGD/IR embolization  Course:  - Intubated 7/9  - Extubated 7/15  - Intubated again 7/18  Plan:  - Patient placed on CPAP/PSV  - Continue to monitor overall clinical status in considering extubation as patient was not intubated for pulmonary reasons    #Sputum production  - Patient complaining of cough and phlegm  - Patient able to self-suction (7/17)  Plan:  - Currently intubated  - Airway clearance protocol    RENAL/  #RAZIA   #ATN i.s.o hypotension  #metabolic acidosis   Findings:  - pH 7.37 (7/16)  - Cr 1.53 (7/16)  - AG 18 (7/17) with high glucose, concern for DKA but ABG pH 7.39, pCO2 26 pO2 87, pHCO3 16, Beta hydroxybutyrate 0 (7/17)  - AG 13 (7/18)  Plan:  - if has AG again, ABG and if acidotic, consider BiPAP  - f/u nephro recs    - consider diuresis for volume overload pending nephro recs, but in light of recent BM pattern, hold off  - Continue sodium bicarb 1300 mg tid, added per nephro  - trend BUN/Cr   - monitor Is and Os     #Urinary retention  Plan:  - Started Doxazosin 2 mg daily (7/15)  - Monitor I/Os  - Ivory placed today by urology, making urine    GASTROINTESTINAL  #Upper GI Bleed  Course:  - s/p 5 units pRBC prior to MICU and then 4:2:2 on 7/12  - CT A/P 7/9 - Active bleeding in the third portion of the duodenum. Progression of liver metastases.  - EGD 7/9 showed esophagitis, One non-bleeding duodenal ulcer with a clean ulcer base (Arjun Class III). One oozing duodenal ulcer with spurting hemorrhage (Arjun Class Ia). Treatment not successful. Treated with bipolar cautery.  - s/p IR embolization on 7/9  - Patient had melanotic stool overnight (7/18) and hemoglobin drop 10.1 to 7.1 in 24 hours  - Reached out to surgery in case of recurrent bleeding per GI if unable to find/contain source during re-scope  - s/p EGD 7/19 showing duodenal bleed s/p 3 clips + epi  - Total EGD: 3  - Bloody BM 7/19 likely old blood  Plan:  - per GI recs, changed PPI gtt to PPI IV BID  - monitor Hgb, transfuse as needed   - hold eliquis   - continue to f/u with GI and IR     - Per GI, "Will need PPI BID for 8 weeks for grade D esophagitis and PUD"  - Follow up GI final recs after procedure    #Nutrition  Course:  - placed OG tube per GI and started ensure clear liquid (see note from RD 7/13/24)  - OG tube removed during extubation (7/15/24)  -dysphagia screen failed  - FEES (7/17) failed  - ENT consulted for vallecular lesion, diagnosed vallecular cyst, no inpatient intervention required, outpatient f/u  Plan:  - OG tube in place  - Diet per RD recs (placed 7/17)  - Speech and swallow to reevaluate overall status improves    #Diarrhea  Course:  - stopped maintenance fluids   - positive for c. diff (7/14)  - started vancomycin (7/14 -)   - having worsening diarrhea, no melena (7/16)  Plan:  - Continue vancomycin  - Fluids as necessary to compensate for volume loss 2/2 diarrhea    #Transaminitis (resolving)  Findings:  - Bili 1.1 (7/16) -> 1.3 (7/17)  - Alk P 359 (7/16)-> 484 (7/17)  -  (7/16)->129 (7/17)  - ALT 67 (7/16)-> 72 (7/17)  - RUQ US (7/17): known liver mets, no acute findings  Plan:  - Restart atorvastatin 80 mg daily    INFECTIOUS DISEASE  #leukocytosis  Findings/Course:  -  BCx 7/10 - NGTD  - Sputum Cx from ETT 7/13 showed numerous gram neg krishna (Klebsiella oxytoca/raoultella ornithinolytica)  - Started zosyn for empiric treatment (7/11 - 7/18)   - GI panel: previously indeterminate norovirus (7/8), negative (7/13)     - per ID- no intervention needed regarding the PCR results  - c. diff + (7/14)  - started vancomycin for c. diff (7/14- )  - febrile 7/16 overnight  - currently afebrile 7/17  - MRSA Swab: negative  - Staph aureus PCR positive  - Culture resistant to Zosyn (7/18)  - Switched Zosyn to Meropenem (7/18- )  - Patient febrile overnight (7/19)  Plan:  - Continue vancomycin 125 mg q6 for c. diff  - Switched to meropenem 1000 mg q8  - Resent cultures  - If persistent fevers, consider escalating vancomycin to 250mg q6 or adding flagyl  - Continue to monitor for fevers    ENDOCRINE  #adrenal insufficiency   Findings:  -7/5 cortisol 17   - 7/11 cortisol 37.1     HEMATOLOGY/ONCOLOGY   # neuroendocrine tumor   - was on lanreotide then had POD in liver and started on peptide receptor radiotherapy (PRRT)- typically given every 8 weeks for 4 doses. He last received it 10/20/2023   - PET 5/28/24 shows new somatostatin avid osseous lesions; decreased intensely avid right supradiaphragmatic and upper abdominal nodes, suspicious for mets  Plan:  - per heme/onc:    - can resume octreotide 150 mcg bid once infection r/o      - can check factor levels V, VIII, X     - "-- f/u with Dr. Sophia Prasad at [facility] [place] after discharge, no plan for treatment inpatient, if clinically improves/stabilizes then can be considered for treatment outpatient"    #DVT ppx  - SCDs  - LE duplex (7/17): negative for DVT    SKINS:   - Lines/Tubes - PIV, cordis replaced with central line (7/14)  Plan:  - Replace central line?  - A line placement?    Ethics:   - Full Code   - GOC 7/12, spoke to spouse (Yamilet) over phone with palliative care team, discussed GOC again on 7/16    Consults this admission: GI, Heme Onc, Palliative, Endocrinology, Cardiology, Nephrology Assessment	  Assessment: 77 yo male with metastatic neuroendocrine pancreatic cancer (tx on hold) and PMH of CAD s/p PCI x3 with most recent stent 6/12/24 on Plavix and eliquis , chronic Afib on eliquis, orthostatic hypotension on midodrine, PAD , recent admission for dx cath on 6/24/24 presented from PCP's office for hypotension despite taking AM midodrine dose on 7/2, admitted to medicine for symptomatic hypotension and RAZAI. Per chart, on 7/8 PM, pt was noted to have acute onset of melena x5 and coffee ground emesis x2-3. RRT was called on 7/9 AM for hypotension, hgb dropped from 9.2 to 7.4 (admission baseline 10-11), FOBT +, pt was started on PPI IV and transfused 1 unit of pRBC. GI was consulted and underwent EGD on 7/9 afternoon. MICU was consulted for uncontrolled bleeding during EGD. During EGD, pt became hypotensive and was given phenylephrine, had A-fib with RVR s/p amiodarone. Now s/p IR GDA embolization, admitted to MICU for further moniring i.s.o hemorrhagic shock 2/2 GI bleed. On repeat EGD, oozing but no active bleeding was noted. Hospital course c/b c.diff, and patient has been placed on vancomycin. Patient had melanotic stool 7/18 with Hgb drop 10.1 to 7.1 and received 2 units pRBC and 1 unit platelets. Patient was reintubated and underwent EGD 7/18 afternoon. GI found duodenal bleed. Patient now s/p 3x clips and epi.    Plan:     NEUROLOGIC  # Baseline AOx3  Course:  - intubated 7/9  - weaned off propofol onto precedex  - extubated 7/15  - reintubated 7/18 for EGD  Plan:  - Patient currently on precedex  - fentanyl prn for pain control (last dose 7/12/24)    CARDIOVASCULAR  # hemorrhagic shock   Course:  - RRT called 7/9 for hypotension   - 2/2 to Upper GI bleeds, urgently took to EGD, found bleeding ulcer that was not well controlled, underwent IR empiric embolization (7/9)  - s/p 5 pRBC prior to MICU, and 4 pRBC, 2 FFP, 2 platelets in MICU (7/17)  - Restarted plavix 7/13/24 for new stent (6/12/24) per GI  - on henny, maintain MAP > 65; took off vaso and precedex (7/13/24), he got tachy to 118. Added precedex back. vasopressin added 7/14  - per spouse, pt's baseline BP is a little bit over 100  - Albumin 5%  mL given 7/16  - Vasopressin stopped (7/17)  - On 7/18, patient had large melanotic stool with Hgb drop from 10.1-7.1, patient was reintubated for GI scope  - s/p 2pRBC, 1 platelet (7/18)  - total transfusions: 11pRBC, 3 platelet  Findings:  - AM cortisol 17 (7/5), AM cortisol 37.1 (7/11)  - POCUS ECHO (7/16) showed no cardiogenic shock, no tamponade, low SV indeterminate IVC, irregular B lines but not concerning for pulmonary edema, and some subdiaphragmatic fluid  Plan:  - Continue to monitor CBC  - Transfuse as needed  - Continue henny, levo, and vaso, try to wean (goal MAP >65)  - Continue to hold Plavix 75 mg  - IVF if not volume overloaded given recent stool patterns  - Continue midodrine 30mg q8  - D/c droxidopa 200mg q8    #CAD s/p PCI x3, most recent stent 6/12/24, NURIA to pLAD  Course:  - restarted plavix 7/13/24 for new stent (6/12/24) per GI   Plan:  - Continue to hold Plavix 75 mg  - Restart atorvastatin 80 mg daily    #PAD  # A-fib on eliquis  May have been worsened because of GI bleed  - s/p successful DCCV 10/31   - In RVR 7/16  - Cards recommended resuming home dose sotalol 40 mg PO (qTC wnl)  - s/p 1 dose sotalol but RVR persisted  - s/p 2 doses of amio overnight (7/16)  - attempted to restart home sotalol, but patient HR returned to 150s in afternoon (7/16)  - Amio restarted amio 150 mg followed by loading dose of 1 mg/min for 6 hours and 0.5 mg/min for 18 hours (7/16)  - received amio 150 overnight (7/17)  Plan:  - Hold eliquis   - Continue amio 400 mg q8 for 12 doses followed by 200 mg daily (first 400mg dose 1300 on 7/17)  - Hold home sotalol  - Cardiology following  - EKG    PULMONARY  #Intubated for airway protection prior to EGD/IR embolization  Course:  - Intubated 7/9  - Extubated 7/15  - Intubated again 7/18  Plan:  - Patient placed on CPAP/PSV  - Continue to monitor overall clinical status in considering extubation as patient was not intubated for pulmonary reasons    #Sputum production  - Patient complaining of cough and phlegm  - Patient able to self-suction (7/17)  Plan:  - Currently intubated  - Airway clearance protocol    RENAL/  #RAZIA   #ATN i.s.o hypotension  #metabolic acidosis   Findings:  - pH 7.37 (7/16)  - Cr 1.53 (7/16)  - AG 18 (7/17) with high glucose, concern for DKA but ABG pH 7.39, pCO2 26 pO2 87, pHCO3 16, Beta hydroxybutyrate 0 (7/17)  - AG 13 (7/18)  - clinically patient appears volume overloaded  Plan:  - if has AG again, ABG and if acidotic, consider BiPAP  - f/u nephro recs  - Continue sodium bicarb 1300 mg tid, added per nephro  - trend BUN/Cr   - monitor Is and Os   - Bumex 2mg IV given today    #Urinary retention  Plan:  - Started Doxazosin 2 mg daily (7/15)  - Monitor I/Os  - Ivory placed today by urology, making urine    GASTROINTESTINAL  #Upper GI Bleed  Course:  - s/p 5 units pRBC prior to MICU and then 4:2:2 on 7/12  - CT A/P 7/9 - Active bleeding in the third portion of the duodenum. Progression of liver metastases.  - EGD 7/9 showed esophagitis, One non-bleeding duodenal ulcer with a clean ulcer base (Arjun Class III). One oozing duodenal ulcer with spurting hemorrhage (Arjun Class Ia). Treatment not successful. Treated with bipolar cautery.  - s/p IR embolization on 7/9  - Patient had melanotic stool overnight (7/18) and hemoglobin drop 10.1 to 7.1 in 24 hours  - Reached out to surgery in case of recurrent bleeding per GI if unable to find/contain source during re-scope  - s/p EGD 7/19 showing duodenal bleed s/p 3 clips + epi  - Total EGD: 3  - Bloody BM 7/19 likely old blood  Plan:  - per GI recs, changed PPI gtt to PPI IV BID  - monitor Hgb, transfuse as needed   - hold eliquis   - continue to f/u with GI and IR     - Per GI, "Will need PPI BID for 8 weeks for grade D esophagitis and PUD"  - Follow up GI final recs after procedure    #Nutrition  Course:  - placed OG tube per GI and started ensure clear liquid (see note from RD 7/13/24)  - OG tube removed during extubation (7/15/24)  -dysphagia screen failed  - FEES (7/17) failed  - ENT consulted for vallecular lesion, diagnosed vallecular cyst, no inpatient intervention required, outpatient f/u  Plan:  - OG tube in place  - Diet per RD recs (placed 7/17)  - Speech and swallow to reevaluate overall status improves    #Diarrhea  Course:  - stopped maintenance fluids   - positive for c. diff (7/14)  - started vancomycin (7/14 -)   - having worsening diarrhea, no melena (7/16)  Plan:  - Continue vancomycin  - Fluids as necessary to compensate for volume loss 2/2 diarrhea    #Transaminitis (resolving)  Findings:  - Bili 1.1 (7/16) -> 1.3 (7/17)  - Alk P 359 (7/16)-> 484 (7/17)  -  (7/16)->129 (7/17)  - ALT 67 (7/16)-> 72 (7/17)  - RUQ US (7/17): known liver mets, no acute findings  Plan:  - Restart atorvastatin 80 mg daily    INFECTIOUS DISEASE  #leukocytosis  Findings/Course:  -  BCx 7/10 - NGTD  - Sputum Cx from ETT 7/13 showed numerous gram neg krishna (Klebsiella oxytoca/raoultella ornithinolytica)  - Started zosyn for empiric treatment (7/11 - 7/18)   - GI panel: previously indeterminate norovirus (7/8), negative (7/13)     - per ID- no intervention needed regarding the PCR results  - c. diff + (7/14)  - started vancomycin for c. diff (7/14- )  - febrile 7/16 overnight  - currently afebrile 7/17  - MRSA Swab: negative  - Staph aureus PCR positive  - Culture resistant to Zosyn (7/18)  - Switched Zosyn to Meropenem (7/18- )  - Patient febrile overnight (7/19)  Plan:  - Continue vancomycin 125 mg q6 for c. diff  - Switched to meropenem 1000 mg q8  - Resent cultures  - If persistent fevers, consider escalating vancomycin to 250mg q6 or adding flagyl  - Continue to monitor for fevers    ENDOCRINE  #adrenal insufficiency   Findings:  -7/5 cortisol 17   - 7/11 cortisol 37.1     HEMATOLOGY/ONCOLOGY   # neuroendocrine tumor   - was on lanreotide then had POD in liver and started on peptide receptor radiotherapy (PRRT)- typically given every 8 weeks for 4 doses. He last received it 10/20/2023   - PET 5/28/24 shows new somatostatin avid osseous lesions; decreased intensely avid right supradiaphragmatic and upper abdominal nodes, suspicious for mets  Plan:  - per heme/onc:    - can resume octreotide 150 mcg bid once infection r/o      - can check factor levels V, VIII, X     - "-- f/u with Dr. Sophia Prasad at [facility] [place] after discharge, no plan for treatment inpatient, if clinically improves/stabilizes then can be considered for treatment outpatient"    #DVT ppx  - SCDs  - LE duplex (7/17): negative for DVT    SKINS:   - Lines/Tubes - PIV, cordis replaced with central line (7/14)  Plan:  - Patient will need to keep a central line with current pressors    Ethics:   - Full Code   - GOC 7/12, spoke to spouse (Yamilet) over phone with palliative care team, discussed GOC again on 7/16    Consults this admission: GI, Heme Onc, Palliative, Endocrinology, Cardiology, Nephrology Assessment	  Assessment: 79 yo male with metastatic neuroendocrine pancreatic cancer (tx on hold) and PMH of CAD s/p PCI x3 with most recent stent 6/12/24 on Plavix and eliquis , chronic Afib on eliquis, orthostatic hypotension on midodrine, PAD , recent admission for dx cath on 6/24/24 presented from PCP's office for hypotension despite taking AM midodrine dose on 7/2, admitted to medicine for symptomatic hypotension and RAZIA. Per chart, on 7/8 PM, pt was noted to have acute onset of melena x5 and coffee ground emesis x2-3. RRT was called on 7/9 AM for hypotension, hgb dropped from 9.2 to 7.4 (admission baseline 10-11), FOBT +, pt was started on PPI IV and transfused 1 unit of pRBC. GI was consulted and underwent EGD on 7/9 afternoon. MICU was consulted for uncontrolled bleeding during EGD. During EGD, pt became hypotensive and was given phenylephrine, had A-fib with RVR s/p amiodarone. Now s/p IR GDA embolization, admitted to MICU for further moniring i.s.o hemorrhagic shock 2/2 GI bleed. On repeat EGD, oozing but no active bleeding was noted. Hospital course c/b c.diff, and patient has been placed on vancomycin. Patient had melanotic stool 7/18 with Hgb drop 10.1 to 7.1 and received 2 units pRBC and 1 unit platelets. Patient was reintubated and underwent EGD 7/18 afternoon. GI found duodenal bleed. Patient now s/p 3x clips and epi.    Plan:     NEUROLOGIC  # Baseline AOx3  Course:  - intubated 7/9  - weaned off propofol onto precedex  - extubated 7/15  - reintubated 7/18 for EGD  Plan:  - Patient currently on precedex  - fentanyl prn for pain control (last dose 7/12/24)    CARDIOVASCULAR  # hemorrhagic shock   Course:  - RRT called 7/9 for hypotension   - 2/2 to Upper GI bleeds, urgently took to EGD, found bleeding ulcer that was not well controlled, underwent IR empiric embolization (7/9)  - s/p 5 pRBC prior to MICU, and 4 pRBC, 2 FFP, 2 platelets in MICU (7/17)  - Restarted plavix 7/13/24 for new stent (6/12/24) per GI  - on henny, maintain MAP > 65; took off vaso and precedex (7/13/24), he got tachy to 118. Added precedex back. vasopressin added 7/14  - per spouse, pt's baseline BP is a little bit over 100  - Albumin 5%  mL given 7/16  - Vasopressin stopped (7/17)  - On 7/18, patient had large melanotic stool with Hgb drop from 10.1-7.1, patient was reintubated for GI scope  - s/p 2pRBC, 1 platelet (7/18)  - total transfusions: 11pRBC, 3 platelet  Findings:  - AM cortisol 17 (7/5), AM cortisol 37.1 (7/11)  - POCUS ECHO (7/16) showed no cardiogenic shock, no tamponade, low SV indeterminate IVC, irregular B lines but not concerning for pulmonary edema, and some subdiaphragmatic fluid  Plan:  - Continue to monitor CBC  - Transfuse as needed  - Continue henny, levo, and vaso, try to wean (goal MAP >65)  - Continue to hold Plavix 75 mg  - IVF if not volume overloaded given recent stool patterns  - Continue midodrine 30mg q8  - D/c droxidopa 200mg q8    #CAD s/p PCI x3, most recent stent 6/12/24, NURIA to pLAD  Course:  - restarted plavix 7/13/24 for new stent (6/12/24) per GI   Plan:  - Continue to hold Plavix 75 mg  - Restart atorvastatin 80 mg daily    #PAD  # A-fib on eliquis  May have been worsened because of GI bleed  - s/p successful DCCV 10/31   - In RVR 7/16  - Cards recommended resuming home dose sotalol 40 mg PO (qTC wnl)  - s/p 1 dose sotalol but RVR persisted  - s/p 2 doses of amio overnight (7/16)  - attempted to restart home sotalol, but patient HR returned to 150s in afternoon (7/16)  - Amio restarted amio 150 mg followed by loading dose of 1 mg/min for 6 hours and 0.5 mg/min for 18 hours (7/16)  - received amio 150 overnight (7/17)  Plan:  - Hold eliquis   - Continue amio 400 mg q8 for 12 doses followed by 200 mg daily (first 400mg dose 1300 on 7/17)  - Hold home sotalol  - Cardiology following  - EKG    PULMONARY  #Intubated for airway protection prior to EGD/IR embolization  Course:  - Intubated 7/9  - Extubated 7/15  - Intubated again 7/18  Plan:  - Patient placed on CPAP/PSV  - Continue to monitor overall clinical status in considering extubation as patient was not intubated for pulmonary reasons    #Sputum production  - Patient complaining of cough and phlegm  - Patient able to self-suction (7/17)  Plan:  - Currently intubated  - Airway clearance protocol    RENAL/  #RAZIA   #ATN i.s.o hypotension  #metabolic acidosis   Findings:  - pH 7.37 (7/16)  - Cr 1.53 (7/16)  - AG 18 (7/17) with high glucose, concern for DKA but ABG pH 7.39, pCO2 26 pO2 87, pHCO3 16, Beta hydroxybutyrate 0 (7/17)  - AG 13 (7/18)  - clinically patient appears volume overloaded  Plan:  - if has AG again, ABG and if acidotic, consider BiPAP  - f/u nephro recs  - Continue sodium bicarb 1300 mg tid, added per nephro  - trend BUN/Cr   - monitor Is and Os   - Bumex 2mg IV given today    #Urinary retention  Plan:  - Started Doxazosin 2 mg daily (7/15)  - Monitor I/Os  - Ivory placed today by urology, making urine    GASTROINTESTINAL  #Upper GI Bleed  Course:  - s/p 5 units pRBC prior to MICU and then 4:2:2 on 7/12  - CT A/P 7/9 - Active bleeding in the third portion of the duodenum. Progression of liver metastases.  - EGD 7/9 showed esophagitis, One non-bleeding duodenal ulcer with a clean ulcer base (Arjun Class III). One oozing duodenal ulcer with spurting hemorrhage (Arjun Class Ia). Treatment not successful. Treated with bipolar cautery.  - s/p IR embolization on 7/9  - Patient had melanotic stool overnight (7/18) and hemoglobin drop 10.1 to 7.1 in 24 hours  - Reached out to surgery in case of recurrent bleeding per GI if unable to find/contain source during re-scope  - s/p EGD 7/19 showing duodenal bleed s/p 3 clips + epi  - Total EGD: 3  - Bloody BM 7/19 likely old blood  Plan:  - per GI recs, changed PPI gtt to PPI IV BID  - monitor Hgb, transfuse as needed   - hold eliquis   - continue to f/u with GI and IR     - Per GI, "Will need PPI BID for 8 weeks for grade D esophagitis and PUD"  - Follow up GI final recs after procedure    #Nutrition  Course:  - placed OG tube per GI and started ensure clear liquid (see note from RD 7/13/24)  - OG tube removed during extubation (7/15/24)  -dysphagia screen failed  - FEES (7/17) failed  - ENT consulted for vallecular lesion, diagnosed vallecular cyst, no inpatient intervention required, outpatient f/u  Plan:  - OG tube in place  - Diet per RD recs (placed 7/17)  - Speech and swallow to reevaluate overall status improves    #Diarrhea  Course:  - stopped maintenance fluids   - positive for c. diff (7/14)  - started vancomycin (7/14 -)   - having worsening diarrhea, no melena (7/16)  Plan:  - Continue vancomycin  - Fluids as necessary to compensate for volume loss 2/2 diarrhea    #Transaminitis (resolving)  Findings:  - Bili 1.1 (7/16) -> 1.3 (7/17)  - Alk P 359 (7/16)-> 484 (7/17)  -  (7/16)->129 (7/17)  - ALT 67 (7/16)-> 72 (7/17)  - RUQ US (7/17): known liver mets, no acute findings  Plan:  - Restart atorvastatin 80 mg daily    INFECTIOUS DISEASE  #leukocytosis  Findings/Course:  -  BCx 7/10 - NGTD  - Sputum Cx from ETT 7/13 showed numerous gram neg krishna (Klebsiella oxytoca/raoultella ornithinolytica)  - Started zosyn for empiric treatment (7/11 - 7/18)   - GI panel: previously indeterminate norovirus (7/8), negative (7/13)     - per ID- no intervention needed regarding the PCR results  - c. diff + (7/14)  - started vancomycin for c. diff (7/14- )  - febrile 7/16 overnight  - currently afebrile 7/17  - MRSA Swab: negative  - Staph aureus PCR positive  - Culture resistant to Zosyn (7/18)  - Switched Zosyn to Meropenem (7/18- )  - Patient febrile overnight (7/19)  Plan:  - Continue vancomycin 125 mg q6 for c. diff  - Switched to meropenem 1000 mg q8  - Re-sent cultures  - If persistent fevers, consider escalating vancomycin to 250mg q6 or adding flagyl  - Continue to monitor for fevers    ENDOCRINE  #adrenal insufficiency   Findings:  -7/5 cortisol 17   - 7/11 cortisol 37.1     HEMATOLOGY/ONCOLOGY   # neuroendocrine tumor   - was on lanreotide then had POD in liver and started on peptide receptor radiotherapy (PRRT)- typically given every 8 weeks for 4 doses. He last received it 10/20/2023   - PET 5/28/24 shows new somatostatin avid osseous lesions; decreased intensely avid right supradiaphragmatic and upper abdominal nodes, suspicious for mets  Plan:  - per heme/onc:    - can resume octreotide 150 mcg bid once infection r/o      - can check factor levels V, VIII, X     - "-- f/u with Dr. Sophia Prasad at [facility] [place] after discharge, no plan for treatment inpatient, if clinically improves/stabilizes then can be considered for treatment outpatient"    #DVT ppx  - SCDs  - LE duplex (7/17): negative for DVT    SKINS:   - Lines/Tubes - PIV, cordis replaced with central line (7/14)  Plan:  - Patient will need to keep a central line with current pressors    Ethics:   - Full Code   - GOC 7/12, spoke to spouse (Yamilet) over phone with palliative care team, discussed GOC again on 7/16    Consults this admission: GI, Heme Onc, Palliative, Endocrinology, Cardiology, Nephrology

## 2024-07-19 NOTE — PHARMACOTHERAPY INTERVENTION NOTE - COMMENTS
NANDO HERNANDEZ 78y Male    Patient is a 78y old  Male who presents with a chief complaint of hypotension (19 Jul 2024 05:22)    MEDICATIONS  (STANDING):  aMIOdarone    Tablet 400 milliGRAM(s) Oral every 8 hours  aMIOdarone    Tablet   Oral   atorvastatin 80 milliGRAM(s) Oral at bedtime  chlorhexidine 0.12% Liquid 15 milliLiter(s) Oral Mucosa every 12 hours  chlorhexidine 2% Cloths 1 Application(s) Topical <User Schedule>  dexMEDEtomidine Infusion 0.1 MICROgram(s)/kG/Hr (2.47 mL/Hr) IV Continuous <Continuous>  doxazosin 2 milliGRAM(s) Oral at bedtime  folic acid Injectable 1 milliGRAM(s) IV Push daily  insulin lispro (ADMELOG) corrective regimen sliding scale   SubCutaneous every 6 hours  meropenem  IVPB      meropenem  IVPB 1000 milliGRAM(s) IV Intermittent every 8 hours  midodrine 30 milliGRAM(s) Oral every 8 hours  mupirocin 2% Nasal 1 Application(s) Both Nostrils two times a day  norepinephrine Infusion 0.05 MICROgram(s)/kG/Min (9.25 mL/Hr) IV Continuous <Continuous>  pantoprazole Infusion 8 mG/Hr (10 mL/Hr) IV Continuous <Continuous>  phenylephrine    Infusion 0.1 MICROgram(s)/kG/Min (1.85 mL/Hr) IV Continuous <Continuous>  propofol Infusion 20 MICROgram(s)/kG/Min (11.8 mL/Hr) IV Continuous <Continuous>  sodium bicarbonate 1300 milliGRAM(s) Oral three times a day  thiamine Injectable 100 milliGRAM(s) IV Push daily  vancomycin    Solution 125 milliGRAM(s) Oral every 6 hours  vasopressin Infusion 0.04 Unit(s)/Min (6 mL/Hr) IV Continuous <Continuous>    Patient is currently on 3 vasopressors as well as droxidopa.  Since the patient is on norepinephrine, we can discontinue the droxidopa.    Recommendations:  1. Discontinue the droxidopa order    MICU team agreed with suggestion.    Cody Anguiano, PharmD  PGY-2 Critical Care Pharmacy Resident  Available via [employer] Teams

## 2024-07-19 NOTE — PROGRESS NOTE ADULT - SUBJECTIVE AND OBJECTIVE BOX
Jodie Okeefe MD PGY-1  ---------------------------------------------------------------------------------------------  Patient is a 78y old  Male who presents with a chief complaint of hypotension (16 Jul 2024 20:45)      HPI:  77 yo male with PMH of CAD s/p PCI x3 with most recent stent 6/12/24 on Plavix, chronic Afib on eliquis, orthostatic hypotension on midodrine, PAD, 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 Patient states since discharge on this past Saturday, he continued to feel ongoing generalized weakness and dizziness with positional changes despite compliance with home midodrine 15mg TID and holding torsemide as instructed. Admits to poor PO intake of fluids/solute due to ongoing issues with poor appetite and nausea with meals which is not new. Denies any fever, chills, chest pain, SOB, cough, abd pain, dysuria nor urinary frequency. Has chronic diarrhea that is unchanged from his baseline.     In the ED, vitals notable for SBP 92-11s. Labs notable for elevated BUN/Cr 31/1.59 (from 30/1.18 on day of discharge 6/29/24). CXR with clear lungs. Abd US with innumerable intrahepatic echogenic masses consistent with known metastatic neuroendocrine tumor. Admitted to medicine for symptomatic hypotension and RAZIA. (02 Jul 2024 18:34)      SUBJECTIVE / OVERNIGHT EVENTS:  Overnight:***    Other Data at Bedside this morning:  Vitals:  - Temp ***, HR ***, BP *** (MAP ***)  Gtt: henny ***, vaso ***, prop ***  Lines: left IJ central line  Vent Settings:  - Mode:    - TV    - RR    - PEEP    - FiO2    MEDICATIONS  (STANDING):  aMIOdarone Infusion 0.5 mG/Min (16.7 mL/Hr) IV Continuous <Continuous>  aMIOdarone Infusion 1 mG/Min (33.3 mL/Hr) IV Continuous <Continuous>  atorvastatin 80 milliGRAM(s) Oral at bedtime  chlorhexidine 2% Cloths 1 Application(s) Topical <User Schedule>  clopidogrel Tablet 75 milliGRAM(s) Enteral Tube daily  doxazosin 2 milliGRAM(s) Oral at bedtime  droxidopa 200 milliGRAM(s) Oral every 8 hours  folic acid Injectable 1 milliGRAM(s) IV Push daily  glucagon  Injectable 1 milliGRAM(s) IntraMuscular once  insulin lispro (ADMELOG) corrective regimen sliding scale   SubCutaneous every 6 hours  magnesium sulfate  IVPB 1 Gram(s) IV Intermittent once  midodrine 30 milliGRAM(s) Oral every 8 hours  pantoprazole  Injectable 40 milliGRAM(s) IV Push every 12 hours  phenylephrine    Infusion 0.25 MICROgram(s)/kG/Min (9.25 mL/Hr) IV Continuous <Continuous>  piperacillin/tazobactam IVPB.. 3.375 Gram(s) IV Intermittent every 8 hours  potassium chloride   Powder 40 milliEquivalent(s) Oral once  sodium bicarbonate 650 milliGRAM(s) Oral three times a day  thiamine Injectable 100 milliGRAM(s) IV Push daily  vancomycin    Solution 125 milliGRAM(s) Oral every 6 hours  vasopressin Infusion 0.04 Unit(s)/Min (6 mL/Hr) IV Continuous <Continuous>    MEDICATIONS  (PRN):  acetaminophen     Tablet .. 650 milliGRAM(s) Oral every 6 hours PRN Temp greater or equal to 38C (100.4F)    Allergies    No Known Allergies    Intolerances    ICU Vital Signs Last 24 Hrs  T(F): 99.7 (17 Jul 2024 03:00), Max: 101.7 (16 Jul 2024 23:00)  HR: 115 (17 Jul 2024 03:30) (105 - 176)  BP: 128/68 (17 Jul 2024 03:30) (87/54 - 184/116)  BP(mean): 92 (17 Jul 2024 03:30) (64 - 140)  ABP: --  ABP(mean): --  RR: 30 (17 Jul 2024 03:30) (21 - 46)  SpO2: 100% (17 Jul 2024 03:30) (92% - 100%)    Physical Exam:   GENERAL: NAD, lying in bed comfortably, headrest elevated, intubated, OG tube  HEAD:  Atraumatic, Normocephalic  EYES: EOMI, PERRLA, conjunctiva and sclera clear  NECK:  No JVD  CHEST/LUNG: Intubated ***  HEART: Tachycardic, irregular rate & rhythm, no murmurs  ABDOMEN: Bowel sounds present; Soft, Nontender  EXTREMITIES: Bilateral LE edema ***  NERVOUS SYSTEM: unable to assess orientation 2/2 intubation    I&O's Summary    15 Jul 2024 07:01  -  16 Jul 2024 07:00  --------------------------------------------------------  IN: 1383.8 mL / OUT: 945 mL / NET: 438.8 mL    16 Jul 2024 07:01  -  17 Jul 2024 05:00  --------------------------------------------------------  IN: 4820 mL / OUT: 940 mL / NET: 3880 mL      LABS:                        10.3   11.64 )-----------( 86       ( 17 Jul 2024 00:41 )             32.1     07-17    143  |  111<H>  |  63<H>  ----------------------------<  301<H>  3.9   |  14<L>  |  1.51<H>    Ca    7.3<L>      17 Jul 2024 00:42  Phos  3.5     07-17  Mg     1.7     07-17    TPro  4.7<L>  /  Alb  2.3<L>  /  TBili  1.3<H>  /  DBili  x   /  AST  129<H>  /  ALT  72<H>  /  AlkPhos  484<H>  07-17    PT/INR - ( 17 Jul 2024 00:42 )   PT: 12.4 sec;   INR: 1.19 ratio         PTT - ( 17 Jul 2024 00:42 )  PTT:30.7 sec  ABG - ( 15 Jul 2024 11:47 )  pH, Arterial: 7.39  pH, Blood: x     /  pCO2: 27    /  pO2: 114   / HCO3: 16    / Base Excess: -7.1  /  SaO2: 99.3        Venous: 07-17-24 @ 00:38 FiO2: 21 Oxygen Sat% 62.7  Venous: 07-16-24 @ 12:10 FiO2: 21 Oxygen Sat% 67.7    POCT Blood Glucose.: 317 mg/dL (17 Jul 2024 01:36)  POCT Blood Glucose.: 185 mg/dL (16 Jul 2024 17:07)  POCT Blood Glucose.: 154 mg/dL (16 Jul 2024 12:12)  POCT Blood Glucose.: 146 mg/dL (16 Jul 2024 06:28)   Jodie Okeefe MD PGY-1  ---------------------------------------------------------------------------------------------  Patient is a 78y old  Male who presents with a chief complaint of hypotension (16 Jul 2024 20:45)      HPI:  79 yo male with PMH of CAD s/p PCI x3 with most recent stent 6/12/24 on Plavix, chronic Afib on eliquis, orthostatic hypotension on midodrine, PAD, 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 Patient states since discharge on this past Saturday, he continued to feel ongoing generalized weakness and dizziness with positional changes despite compliance with home midodrine 15mg TID and holding torsemide as instructed. Admits to poor PO intake of fluids/solute due to ongoing issues with poor appetite and nausea with meals which is not new. Denies any fever, chills, chest pain, SOB, cough, abd pain, dysuria nor urinary frequency. Has chronic diarrhea that is unchanged from his baseline.     In the ED, vitals notable for SBP 92-11s. Labs notable for elevated BUN/Cr 31/1.59 (from 30/1.18 on day of discharge 6/29/24). CXR with clear lungs. Abd US with innumerable intrahepatic echogenic masses consistent with known metastatic neuroendocrine tumor. Admitted to medicine for symptomatic hypotension and RAZIA. (02 Jul 2024 18:34)      SUBJECTIVE / OVERNIGHT EVENTS:  Overnight, patient's BP was steadily dropping despite increasing pressors. Levo was started. Patient also became febrile and cultures were sent out. Patient appeared volume overloaded on clinical exam and was noted to have a large IVC. Patient has 2 bloody BM (melanotic in appearance), but seemed to be less blood than yesterday. Repeat Hgb from 7.7 to 9.1 this morning.    Patient examined at bedside this morning. Patient is awake and not sedated but history limited by intubation. Urology came and inserted a tavarez.     Other Data at Bedside this morning:  Vitals:  - Temp 38.4, , /67 (MAP 82), RR 35  Gtt: henny 3, vaso 0.04, levo 0.03, no sedation  Lines: left IJ central line  Vent Settings:  - Mode:    -     - RR 16    - PEEP 5    - FiO2 40    MEDICATIONS  (STANDING):  aMIOdarone Infusion 0.5 mG/Min (16.7 mL/Hr) IV Continuous <Continuous>  aMIOdarone Infusion 1 mG/Min (33.3 mL/Hr) IV Continuous <Continuous>  atorvastatin 80 milliGRAM(s) Oral at bedtime  chlorhexidine 2% Cloths 1 Application(s) Topical <User Schedule>  clopidogrel Tablet 75 milliGRAM(s) Enteral Tube daily  doxazosin 2 milliGRAM(s) Oral at bedtime  droxidopa 200 milliGRAM(s) Oral every 8 hours  folic acid Injectable 1 milliGRAM(s) IV Push daily  glucagon  Injectable 1 milliGRAM(s) IntraMuscular once  insulin lispro (ADMELOG) corrective regimen sliding scale   SubCutaneous every 6 hours  magnesium sulfate  IVPB 1 Gram(s) IV Intermittent once  midodrine 30 milliGRAM(s) Oral every 8 hours  pantoprazole  Injectable 40 milliGRAM(s) IV Push every 12 hours  phenylephrine    Infusion 0.25 MICROgram(s)/kG/Min (9.25 mL/Hr) IV Continuous <Continuous>  piperacillin/tazobactam IVPB.. 3.375 Gram(s) IV Intermittent every 8 hours  potassium chloride   Powder 40 milliEquivalent(s) Oral once  sodium bicarbonate 650 milliGRAM(s) Oral three times a day  thiamine Injectable 100 milliGRAM(s) IV Push daily  vancomycin    Solution 125 milliGRAM(s) Oral every 6 hours  vasopressin Infusion 0.04 Unit(s)/Min (6 mL/Hr) IV Continuous <Continuous>    MEDICATIONS  (PRN):  acetaminophen     Tablet .. 650 milliGRAM(s) Oral every 6 hours PRN Temp greater or equal to 38C (100.4F)    Allergies    No Known Allergies    Intolerances    ICU Vital Signs Last 24 Hrs  T(F): 99.7 (17 Jul 2024 03:00), Max: 101.7 (16 Jul 2024 23:00)  HR: 115 (17 Jul 2024 03:30) (105 - 176)  BP: 128/68 (17 Jul 2024 03:30) (87/54 - 184/116)  BP(mean): 92 (17 Jul 2024 03:30) (64 - 140)  ABP: --  ABP(mean): --  RR: 30 (17 Jul 2024 03:30) (21 - 46)  SpO2: 100% (17 Jul 2024 03:30) (92% - 100%)    Physical Exam:   GENERAL: NAD, lying in bed, headrest elevated, intubated, OG tube, awake  HEAD:  Atraumatic, Normocephalic  EYES: EOMI, PERRLA, conjunctiva and sclera clear  NECK:  No JVD  CHEST/LUNG: Intubated, coarse breath sounds bilaterally  HEART: Tachycardic, irregular rate & rhythm, no murmurs  ABDOMEN: Bowel sounds present; Soft, Nontender  : swollen, tavarez in place  EXTREMITIES: Bilateral LE edema, worsened UE swelling  NERVOUS SYSTEM: unable to assess orientation 2/2 intubation    I&O's Summary    15 Jul 2024 07:01  -  16 Jul 2024 07:00  --------------------------------------------------------  IN: 1383.8 mL / OUT: 945 mL / NET: 438.8 mL    16 Jul 2024 07:01  -  17 Jul 2024 05:00  --------------------------------------------------------  IN: 4820 mL / OUT: 940 mL / NET: 3880 mL      LABS:                        10.3   11.64 )-----------( 86       ( 17 Jul 2024 00:41 )             32.1     07-17    143  |  111<H>  |  63<H>  ----------------------------<  301<H>  3.9   |  14<L>  |  1.51<H>    Ca    7.3<L>      17 Jul 2024 00:42  Phos  3.5     07-17  Mg     1.7     07-17    TPro  4.7<L>  /  Alb  2.3<L>  /  TBili  1.3<H>  /  DBili  x   /  AST  129<H>  /  ALT  72<H>  /  AlkPhos  484<H>  07-17    PT/INR - ( 17 Jul 2024 00:42 )   PT: 12.4 sec;   INR: 1.19 ratio         PTT - ( 17 Jul 2024 00:42 )  PTT:30.7 sec  ABG - ( 15 Jul 2024 11:47 )  pH, Arterial: 7.39  pH, Blood: x     /  pCO2: 27    /  pO2: 114   / HCO3: 16    / Base Excess: -7.1  /  SaO2: 99.3        Venous: 07-17-24 @ 00:38 FiO2: 21 Oxygen Sat% 62.7  Venous: 07-16-24 @ 12:10 FiO2: 21 Oxygen Sat% 67.7    POCT Blood Glucose.: 317 mg/dL (17 Jul 2024 01:36)  POCT Blood Glucose.: 185 mg/dL (16 Jul 2024 17:07)  POCT Blood Glucose.: 154 mg/dL (16 Jul 2024 12:12)  POCT Blood Glucose.: 146 mg/dL (16 Jul 2024 06:28)   Jodie Okeefe MD PGY-1  ---------------------------------------------------------------------------------------------  Patient is a 78y old  Male who presents with a chief complaint of hypotension (16 Jul 2024 20:45)      HPI:  77 yo male with PMH of CAD s/p PCI x3 with most recent stent 6/12/24 on Plavix, chronic Afib on eliquis, orthostatic hypotension on midodrine, PAD, 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 Patient states since discharge on this past Saturday, he continued to feel ongoing generalized weakness and dizziness with positional changes despite compliance with home midodrine 15mg TID and holding torsemide as instructed. Admits to poor PO intake of fluids/solute due to ongoing issues with poor appetite and nausea with meals which is not new. Denies any fever, chills, chest pain, SOB, cough, abd pain, dysuria nor urinary frequency. Has chronic diarrhea that is unchanged from his baseline.     In the ED, vitals notable for SBP 92-11s. Labs notable for elevated BUN/Cr 31/1.59 (from 30/1.18 on day of discharge 6/29/24). CXR with clear lungs. Abd US with innumerable intrahepatic echogenic masses consistent with known metastatic neuroendocrine tumor. Admitted to medicine for symptomatic hypotension and RAZIA. (02 Jul 2024 18:34)      SUBJECTIVE / OVERNIGHT EVENTS:  Overnight, patient's BP was steadily dropping despite increasing pressors. Levo was started. Patient also became febrile and cultures were sent out. Patient appeared volume overloaded on clinical exam and was noted to have a large IVC. Patient has 2 bloody BM (melanotic in appearance), but seemed to be less blood than yesterday. Repeat Hgb from 7.7 to 9.1 this morning.    Patient examined at bedside this morning. Patient is awake and not sedated but history limited by intubation. Urology came and inserted a tavarez.     Other Data at Bedside this morning:  Vitals:  - Temp 38.4, , /67 (MAP 82), RR 35  Gtt: henny 3, vaso 0.04, levo 0.03, no sedation  Lines: left IJ central line  Vent Settings:  - Mode:    -     - RR 16    - PEEP 5    - FiO2 40    MEDICATIONS  (STANDING):  aMIOdarone Infusion 0.5 mG/Min (16.7 mL/Hr) IV Continuous <Continuous>  aMIOdarone Infusion 1 mG/Min (33.3 mL/Hr) IV Continuous <Continuous>  atorvastatin 80 milliGRAM(s) Oral at bedtime  chlorhexidine 2% Cloths 1 Application(s) Topical <User Schedule>  clopidogrel Tablet 75 milliGRAM(s) Enteral Tube daily  doxazosin 2 milliGRAM(s) Oral at bedtime  droxidopa 200 milliGRAM(s) Oral every 8 hours  folic acid Injectable 1 milliGRAM(s) IV Push daily  glucagon  Injectable 1 milliGRAM(s) IntraMuscular once  insulin lispro (ADMELOG) corrective regimen sliding scale   SubCutaneous every 6 hours  magnesium sulfate  IVPB 1 Gram(s) IV Intermittent once  midodrine 30 milliGRAM(s) Oral every 8 hours  pantoprazole  Injectable 40 milliGRAM(s) IV Push every 12 hours  phenylephrine    Infusion 0.25 MICROgram(s)/kG/Min (9.25 mL/Hr) IV Continuous <Continuous>  piperacillin/tazobactam IVPB.. 3.375 Gram(s) IV Intermittent every 8 hours  potassium chloride   Powder 40 milliEquivalent(s) Oral once  sodium bicarbonate 650 milliGRAM(s) Oral three times a day  thiamine Injectable 100 milliGRAM(s) IV Push daily  vancomycin    Solution 125 milliGRAM(s) Oral every 6 hours  vasopressin Infusion 0.04 Unit(s)/Min (6 mL/Hr) IV Continuous <Continuous>    MEDICATIONS  (PRN):  acetaminophen     Tablet .. 650 milliGRAM(s) Oral every 6 hours PRN Temp greater or equal to 38C (100.4F)    Allergies    No Known Allergies    Intolerances    ICU Vital Signs Last 24 Hrs  T(F): 99.7 (17 Jul 2024 03:00), Max: 101.7 (16 Jul 2024 23:00)  HR: 115 (17 Jul 2024 03:30) (105 - 176)  BP: 128/68 (17 Jul 2024 03:30) (87/54 - 184/116)  BP(mean): 92 (17 Jul 2024 03:30) (64 - 140)  ABP: --  ABP(mean): --  RR: 30 (17 Jul 2024 03:30) (21 - 46)  SpO2: 100% (17 Jul 2024 03:30) (92% - 100%)    Physical Exam:   GENERAL: NAD, lying in bed, headrest elevated, intubated, OG tube, awake  HEAD:  Atraumatic, Normocephalic  EYES: EOMI, PERRLA, conjunctiva and sclera clear  NECK:  No JVD  CHEST/LUNG: Intubated, coarse breath sounds bilaterally  HEART: Tachycardic, irregular rate & rhythm, no murmurs  ABDOMEN: Bowel sounds present; Soft, Nontender  : penile edema, tavarez in place  EXTREMITIES: Bilateral LE edema, worsened UE swelling  NERVOUS SYSTEM: unable to assess orientation 2/2 intubation    I&O's Summary    15 Jul 2024 07:01  -  16 Jul 2024 07:00  --------------------------------------------------------  IN: 1383.8 mL / OUT: 945 mL / NET: 438.8 mL    16 Jul 2024 07:01  -  17 Jul 2024 05:00  --------------------------------------------------------  IN: 4820 mL / OUT: 940 mL / NET: 3880 mL      LABS:                        10.3   11.64 )-----------( 86       ( 17 Jul 2024 00:41 )             32.1     07-17    143  |  111<H>  |  63<H>  ----------------------------<  301<H>  3.9   |  14<L>  |  1.51<H>    Ca    7.3<L>      17 Jul 2024 00:42  Phos  3.5     07-17  Mg     1.7     07-17    TPro  4.7<L>  /  Alb  2.3<L>  /  TBili  1.3<H>  /  DBili  x   /  AST  129<H>  /  ALT  72<H>  /  AlkPhos  484<H>  07-17    PT/INR - ( 17 Jul 2024 00:42 )   PT: 12.4 sec;   INR: 1.19 ratio         PTT - ( 17 Jul 2024 00:42 )  PTT:30.7 sec  ABG - ( 15 Jul 2024 11:47 )  pH, Arterial: 7.39  pH, Blood: x     /  pCO2: 27    /  pO2: 114   / HCO3: 16    / Base Excess: -7.1  /  SaO2: 99.3        Venous: 07-17-24 @ 00:38 FiO2: 21 Oxygen Sat% 62.7  Venous: 07-16-24 @ 12:10 FiO2: 21 Oxygen Sat% 67.7    POCT Blood Glucose.: 317 mg/dL (17 Jul 2024 01:36)  POCT Blood Glucose.: 185 mg/dL (16 Jul 2024 17:07)  POCT Blood Glucose.: 154 mg/dL (16 Jul 2024 12:12)  POCT Blood Glucose.: 146 mg/dL (16 Jul 2024 06:28)   Jodie Okeefe MD PGY-1  ---------------------------------------------------------------------------------------------  Patient is a 78y old  Male who presents with a chief complaint of hypotension (16 Jul 2024 20:45)      HPI:  79 yo male with PMH of CAD s/p PCI x3 with most recent stent 6/12/24 on Plavix, chronic Afib on eliquis, orthostatic hypotension on midodrine, PAD, 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 Patient states since discharge on this past Saturday, he continued to feel ongoing generalized weakness and dizziness with positional changes despite compliance with home midodrine 15mg TID and holding torsemide as instructed. Admits to poor PO intake of fluids/solute due to ongoing issues with poor appetite and nausea with meals which is not new. Denies any fever, chills, chest pain, SOB, cough, abd pain, dysuria nor urinary frequency. Has chronic diarrhea that is unchanged from his baseline.     In the ED, vitals notable for SBP 92-11s. Labs notable for elevated BUN/Cr 31/1.59 (from 30/1.18 on day of discharge 6/29/24). CXR with clear lungs. Abd US with innumerable intrahepatic echogenic masses consistent with known metastatic neuroendocrine tumor. Admitted to medicine for symptomatic hypotension and RAZIA. (02 Jul 2024 18:34)      SUBJECTIVE / OVERNIGHT EVENTS:  Overnight, patient's BP was steadily dropping despite increasing pressors. Levo was started. Patient also became febrile and cultures were sent out. Patient appeared volume overloaded on clinical exam and was noted to have a large IVC. Patient has 2 bloody BM (melanotic in appearance), but seemed to be less blood than yesterday. Repeat Hgb from 7.7 to 9.1 this morning.    Patient examined at bedside this morning. Patient is awake and not sedated but history limited by intubation. Urology came and inserted a tavarez.     Other Data at Bedside this morning:  Vitals:  - Temp 38.4, , /67 (MAP 82), RR 35  Gtt: henny 3, vaso 0.04, levo 0.03, no sedation  Lines: left IJ central line  Vent Settings:  - Mode:    -     - RR 16    - PEEP 5    - FiO2 40    MEDICATIONS  (STANDING):  aMIOdarone Infusion 0.5 mG/Min (16.7 mL/Hr) IV Continuous <Continuous>  aMIOdarone Infusion 1 mG/Min (33.3 mL/Hr) IV Continuous <Continuous>  atorvastatin 80 milliGRAM(s) Oral at bedtime  chlorhexidine 2% Cloths 1 Application(s) Topical <User Schedule>  clopidogrel Tablet 75 milliGRAM(s) Enteral Tube daily  doxazosin 2 milliGRAM(s) Oral at bedtime  droxidopa 200 milliGRAM(s) Oral every 8 hours  folic acid Injectable 1 milliGRAM(s) IV Push daily  glucagon  Injectable 1 milliGRAM(s) IntraMuscular once  insulin lispro (ADMELOG) corrective regimen sliding scale   SubCutaneous every 6 hours  magnesium sulfate  IVPB 1 Gram(s) IV Intermittent once  midodrine 30 milliGRAM(s) Oral every 8 hours  pantoprazole  Injectable 40 milliGRAM(s) IV Push every 12 hours  phenylephrine    Infusion 0.25 MICROgram(s)/kG/Min (9.25 mL/Hr) IV Continuous <Continuous>  piperacillin/tazobactam IVPB.. 3.375 Gram(s) IV Intermittent every 8 hours  potassium chloride   Powder 40 milliEquivalent(s) Oral once  sodium bicarbonate 650 milliGRAM(s) Oral three times a day  thiamine Injectable 100 milliGRAM(s) IV Push daily  vancomycin    Solution 125 milliGRAM(s) Oral every 6 hours  vasopressin Infusion 0.04 Unit(s)/Min (6 mL/Hr) IV Continuous <Continuous>    MEDICATIONS  (PRN):  acetaminophen     Tablet .. 650 milliGRAM(s) Oral every 6 hours PRN Temp greater or equal to 38C (100.4F)    Allergies    No Known Allergies    Intolerances    ICU Vital Signs Last 24 Hrs  T(F): 99.7 (17 Jul 2024 03:00), Max: 101.7 (16 Jul 2024 23:00)  HR: 115 (17 Jul 2024 03:30) (105 - 176)  BP: 128/68 (17 Jul 2024 03:30) (87/54 - 184/116)  BP(mean): 92 (17 Jul 2024 03:30) (64 - 140)  ABP: --  ABP(mean): --  RR: 30 (17 Jul 2024 03:30) (21 - 46)  SpO2: 100% (17 Jul 2024 03:30) (92% - 100%)    Physical Exam:   GENERAL: NAD, lying in bed, headrest elevated, intubated, OG tube, awake, left IJ central catheter in place  HEAD:  Atraumatic, Normocephalic  EYES: EOMI, PERRLA, conjunctiva and sclera clear  NECK:  No JVD  CHEST/LUNG: Intubated, coarse breath sounds bilaterally  HEART: Tachycardic, irregular rate & rhythm, no murmurs  ABDOMEN: Bowel sounds present; Soft, Nontender  : Penile edema, tavarez in place  EXTREMITIES: Bilateral LE edema, worsened UE swelling  NERVOUS SYSTEM: Unable to assess orientation 2/2 intubation    I&O's Summary    15 Jul 2024 07:01  -  16 Jul 2024 07:00  --------------------------------------------------------  IN: 1383.8 mL / OUT: 945 mL / NET: 438.8 mL    16 Jul 2024 07:01  -  17 Jul 2024 05:00  --------------------------------------------------------  IN: 4820 mL / OUT: 940 mL / NET: 3880 mL      LABS:                        10.3   11.64 )-----------( 86       ( 17 Jul 2024 00:41 )             32.1     07-17    143  |  111<H>  |  63<H>  ----------------------------<  301<H>  3.9   |  14<L>  |  1.51<H>    Ca    7.3<L>      17 Jul 2024 00:42  Phos  3.5     07-17  Mg     1.7     07-17    TPro  4.7<L>  /  Alb  2.3<L>  /  TBili  1.3<H>  /  DBili  x   /  AST  129<H>  /  ALT  72<H>  /  AlkPhos  484<H>  07-17    PT/INR - ( 17 Jul 2024 00:42 )   PT: 12.4 sec;   INR: 1.19 ratio         PTT - ( 17 Jul 2024 00:42 )  PTT:30.7 sec  ABG - ( 15 Jul 2024 11:47 )  pH, Arterial: 7.39  pH, Blood: x     /  pCO2: 27    /  pO2: 114   / HCO3: 16    / Base Excess: -7.1  /  SaO2: 99.3        Venous: 07-17-24 @ 00:38 FiO2: 21 Oxygen Sat% 62.7  Venous: 07-16-24 @ 12:10 FiO2: 21 Oxygen Sat% 67.7    POCT Blood Glucose.: 317 mg/dL (17 Jul 2024 01:36)  POCT Blood Glucose.: 185 mg/dL (16 Jul 2024 17:07)  POCT Blood Glucose.: 154 mg/dL (16 Jul 2024 12:12)  POCT Blood Glucose.: 146 mg/dL (16 Jul 2024 06:28)

## 2024-07-19 NOTE — PROGRESS NOTE ADULT - ASSESSMENT
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, PAD, and orthostatic hypotension on midodrine presenting for lightheadedness He was admitted for initial complaint of lightheadedness and low bp despite midodrine, now with melena and coffee ground emesis and acute blood loss anemia, found to hemorrhagic shock due to duodenal bleed s/p GDA embolization and rebleed found to have duodenal ulcer with active oozing s/p clip, epi, and nexpowde.r     Impression:  #Hemorrhagic Shock  #Acute on chronic blood loss anemia  #Duodenal bleed s/p GDA embolization  #LA Grade B Esophagitis  #C-diff infection  #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 Found to have active bleed in second portion of duodenum, unable to identify source underneath large clot on EGD so underwent empiric embolization of GDA by IR for bleeding control. Hgb started dropping after starting cangrelor with worsening renal and liver failure. Repeat EGD 7/12 showed grade D esophagitis, organized clots with extensive duodenal ulcers (some with pigmented spots) w/o active bleeding, treated with purastat. Rebled 7/18 with Hgb drop two points in 9 hours s/p repeat EGD showing duodenal ulcer with active oozing and another ulcer with with visible vessel. 3 Clips and epi applied to oozing ulcer with hemostasis. Nexpowder applied to both ulcers for further hemostasis. No further bleeding since procedure    Recommendations:  - Continue PPI gtt  - Hold plavix and eliquis if able  - If rebleeds, may require surgical intervention  - Trend cbc and coags, maintain active T&S, transfuse Hgb>8  - Vancomycin PO for c-diff infection  - Respiratory and circulatory support per ICU team  - Appreciate palliative care involvement given guarded prognosis  - Will need PPI BID for 8 weeks for grade B esophagitis and PUD    All recommendations are tentative until note is attested by attending.     Bailey Choudhury, PGY4  Gastroenterology/Hepatology Fellow  Available on [employer] Teams  [phone] (Long Range Pager)  [phone] (Short Range Pager [facility])    After 5 pm, please contact the on-call GI fellow for any urgent issues via the Hospital Call

## 2024-07-19 NOTE — PROGRESS NOTE ADULT - ASSESSMENT
79 yo male with PMH of CAD s/p PCI x3 with most recent stent 6/12/24 on Plavix, chronic Afib on eliquis, orthostatic hypotension on midodrine, PAD, 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 Patient states since discharge on this past Saturday, he continued to feel ongoing generalized weakness and dizziness with positional changes despite compliance with home midodrine 15mg TID and holding torsemide as instructed as of day PTA . Admits to poor PO intake of fluids/solute due to ongoing issues with poor appetite and nausea with meals which is not new. In the ED, vitals notable for SBP 92-11s. Labs notable for elevated BUN/Cr 31/1.59 (from 30/1.18 on day of discharge 6/29/24). CXR with clear lungs. Abd US with innumerable intrahepatic echogenic masses consistent with known metastatic neuroendocrine tumor. Admitted to medicine for symptomatic hypotension and RAZIA.      1- CKD III    2-  hypotension   3- hx chf   4- pericarditis   5- C Diff   6- acidosis       RAZIA in setting of hypotension, anemia -> GIB, hemorrhagic shock leading to oliguric ATN  received CT IV contrast, and emergently went to IR for mesenteric embolization 7/9  has had decrease urination   to receive iv alb and bumex 2 mg iv   non oliguric   bp support needed with  midodrine 30 mg tid as well as northera   amio per icu team  IV alb    vanco 125 mg q 6 enteral   acidosis for now cont  increase   sodium bicarb 1950  mg tid  may need bicarb drip   strict I/O  monitor creatinine closely and lytes  given ATN with hypotension and multiple 3 + pressors and metastatic cancer ---> if no improvement in uo will not be a dialysis candidate   d/w icu team when seen earlier

## 2024-07-19 NOTE — PROGRESS NOTE ADULT - SUBJECTIVE AND OBJECTIVE BOX
[facility]   [phone]  RENAL FOLLOW UP NOTE  --------------------------------------------------------------------------------  Chief Complaint:    24 hour events/subjective:    seen earlier   intubated      PAST HISTORY  --------------------------------------------------------------------------------  No significant changes to PMH, PSH, FHx, SHx, unless otherwise noted    ALLERGIES & MEDICATIONS  --------------------------------------------------------------------------------  Allergies    No Known Allergies    Intolerances      Standing Inpatient Medications  aMIOdarone    Tablet 400 milliGRAM(s) Oral every 8 hours  aMIOdarone    Tablet   Oral   atorvastatin 80 milliGRAM(s) Oral at bedtime  chlorhexidine 0.12% Liquid 15 milliLiter(s) Oral Mucosa every 12 hours  chlorhexidine 2% Cloths 1 Application(s) Topical <User Schedule>  dexMEDEtomidine Infusion 0.1 MICROgram(s)/kG/Hr IV Continuous <Continuous>  doxazosin 2 milliGRAM(s) Oral at bedtime  folic acid Injectable 1 milliGRAM(s) IV Push daily  insulin lispro (ADMELOG) corrective regimen sliding scale   SubCutaneous every 6 hours  meropenem  IVPB 1000 milliGRAM(s) IV Intermittent every 8 hours  meropenem  IVPB      midodrine 30 milliGRAM(s) Oral every 8 hours  mupirocin 2% Nasal 1 Application(s) Both Nostrils two times a day  norepinephrine Infusion 0.05 MICROgram(s)/kG/Min IV Continuous <Continuous>  pantoprazole Infusion 8 mG/Hr IV Continuous <Continuous>  phenylephrine    Infusion 0.1 MICROgram(s)/kG/Min IV Continuous <Continuous>  propofol Infusion 20 MICROgram(s)/kG/Min IV Continuous <Continuous>  sodium bicarbonate 1300 milliGRAM(s) Oral three times a day  thiamine Injectable 100 milliGRAM(s) IV Push daily  vancomycin    Solution 125 milliGRAM(s) Oral every 6 hours  vasopressin Infusion 0.04 Unit(s)/Min IV Continuous <Continuous>    PRN Inpatient Medications      REVIEW OF SYSTEMS  --------------------------------------------------------------------------------      VITALS/PHYSICAL EXAM  --------------------------------------------------------------------------------  T(C): 38.2 (07-19-24 @ 14:00), Max: 38.7 (07-19-24 @ 06:00)  HR: 102 (07-19-24 @ 15:02) (89 - 122)  BP: 109/66 (07-19-24 @ 14:30) (76/50 - 149/113)  RR: 25 (07-19-24 @ 14:30) (23 - 36)  SpO2: 100% (07-19-24 @ 15:02) (73% - 100%)  Wt(kg): --        07-18-24 @ 07:01  -  07-19-24 @ 07:00  --------------------------------------------------------  IN: 3964.2 mL / OUT: 0 mL / NET: 3964.2 mL    07-19-24 @ 07:01  -  07-19-24 @ 15:07  --------------------------------------------------------  IN: 755.2 mL / OUT: 1220 mL / NET: -464.8 mL      Physical Exam:  	        Gen:  intubated   	Pulm: decrease bs  no rales  +  ronchi  -  wheezing  	CV: No JVD. RRR, S1S2; no rub  	Abd: +BS,  + ascites and distended  	: No bladder distention   	UE: Warm, no cyanosis  no clubbing,  no edema  	LE: Warm, no cyanosis  no clubbing,  4+  edema      LABS/STUDIES  --------------------------------------------------------------------------------              7.7    16.53 >-----------<  103      [07-19-24 @ 13:49]              24.1     144  |  117  |  54  ----------------------------<  169      [07-19-24 @ 06:31]  3.9   |  14  |  1.40        Ca     7.5     [07-19-24 @ 06:31]      Mg     1.7     [07-19-24 @ 06:31]      Phos  4.0     [07-19-24 @ 06:31]    TPro  4.3  /  Alb  2.3  /  TBili  0.8  /  DBili  x   /  AST  43  /  ALT  35  /  AlkPhos  220  [07-19-24 @ 06:31]    PT/INR: PT 12.0 , INR 1.15       [07-19-24 @ 00:42]  PTT: 29.4       [07-19-24 @ 00:42]          [07-19-24 @ 00:42]    Creatinine Trend:  SCr 1.40 [07-19 @ 06:31]  SCr 1.41 [07-19 @ 00:42]  SCr 1.30 [07-18 @ 00:21]  SCr 1.40 [07-17 @ 11:54]  SCr 1.51 [07-17 @ 00:42]    TSH 2.47      [07-04-24 @ 12:31]

## 2024-07-19 NOTE — PROGRESS NOTE ADULT - ATTENDING COMMENTS
s/p egd with multiple duodenal ulcers including 1 with VV and active bleeding s/p Epi/Clips/NexPowder  Stable yesterday, but with melena and drop in hgb  Responded to unit prbc in afternoon  c/w supportive care  c/w iv ppi drip  c/w npo    Agree with surgical consult  Limited role for repeat endoscopic therapy if persistent bleeding  GI to follow

## 2024-07-19 NOTE — PROGRESS NOTE ADULT - ASSESSMENT
79 yo male with PMH of CAD s/p PCI x3 with most recent stent 6/12/24 on Plavix, chronic Afib on eliquis, orthostatic hypotension on midodrine, PAD, neuroendocrine tumor with RT currently on hold, recent admission for dx cath on 6/24/24 who presented for hypotension, admitted for GIB and hemorrhagic shock. Found to be bleeding from duodenum. Transferred to MICU, on pressors.     1. Pancreatic NET- metastatic   -- was on lanreotide then had POD in liver and started on peptide receptor radiotherapy (PRRT)- typically given every 8 weeks for 4 doses. He received one dose on 10/20/2023 and planned to get his 2nd dose at the end of December however his course was complicated by multiple hospitalizations that were noncancer related (decompensated heart failure).   -- 5/28/24 PET Dotatate (compared to 9/2023): several new somatostatin avid osseous lesions, some faintly sclerotic, suspicious for mets. Increased upper RP nodes, probably metastatic. Decreased intensely tracer avid right supradiaphragmatic and upper abdominal nodes, suspicious for metastasis. Redemonstrated intensely somatostatin avid bilobar hepatic lesions, consistent with metastases again morphologically difficult to delineate.   -- CT reviewed by [facility]: SINCE MAY 8 2024 INCREASED HEPATIC METASTASES, NEWLY SEEN PRIMARY TUMOR IN THE PANCREAS, and active bleeding within the duodenum with associated intraluminal hematoma.   -- chromogranin level is elevated at 2058, but no prior level at [facility] for review   -- [place] [facility] [place], no plan for treatment inpatient, if clinically improves/stabilizes then can be considered for treatment outpatient    2. Duodenal bleed, Hemorrhagic shock  - Remains in MICU, intubated  - CT w/ bleed in duodenum. GI called, EGD 7/9 -> S/p  IR embolization 7/9, intubated in MICU -> s/p extubation 7/15 -> intubated 7/18  - s/p multiple transfusions, fibrinogen low would recommend Cry for < 100, but coags have improved as are essentially normal now so unlikely, probably was related to liver dysfunction.   - Once infection ruled out, may start octreotide 150 mcg BID  - s/p repeat EGD 7/12 showing duodenal lesions w/clots and oozing, no clear targets for intervention and no active bleeding, purastat applied to all areas of oozing.   - S/p EGD 7/18: duodenal ulcer with active oozing, ulcer with with visible vessel. Bleeding treated. Per GI, holding plavix and eliquis if able, if rebleeds, may require surgical intervention    3. C. diff diarrhea  - on oral paulino Topete PA-C  Hematology/Oncology  [facility]  [phone] (office)

## 2024-07-20 NOTE — PROGRESS NOTE ADULT - ASSESSMENT
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, PAD, and orthostatic hypotension on midodrine presenting for lightheadedness He was admitted for initial complaint of lightheadedness and low bp despite midodrine, now with melena and coffee ground emesis and acute blood loss anemia, found to hemorrhagic shock due to duodenal bleed s/p GDA embolization and rebleed found to have duodenal ulcer with active oozing s/p clip, epi, and nexpowde.r     Impression:  #Hemorrhagic Shock  #Acute on chronic blood loss anemia  #Duodenal bleed s/p GDA embolization  #LA Grade B Esophagitis  #C-diff infection  #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 Found to have active bleed in second portion of duodenum, unable to identify source underneath large clot on EGD so underwent empiric embolization of GDA by IR for bleeding control. Hgb started dropping after starting cangrelor with worsening renal and liver failure. Repeat EGD 7/12 showed grade D esophagitis, organized clots with extensive duodenal ulcers (some with pigmented spots) w/o active bleeding, treated with purastat. Rebled 7/18 with Hgb drop two points in 9 hours s/p repeat EGD showing duodenal ulcer with active oozing and another ulcer with with visible vessel. 3 Clips and epi applied to oozing ulcer with hemostasis. Nexpowder applied to both ulcers for further hemostasis. No further bleeding since procedure    Recommendations:  - Continue PPI gtt  - Hold plavix and eliquis if able  - If rebleeds, may require surgical intervention  - Trend cbc and coags, maintain active T&S, transfuse Hgb>8  - Vancomycin PO for c-diff infection  - Respiratory and circulatory support per ICU team  - Appreciate palliative care involvement given guarded prognosis  - Will need PPI BID for 8 weeks for grade B esophagitis and PUD after off PPI gtt     All recommendations are tentative until note is attested by attending.     Lu Neumann, PGY-6  Gastroenterology/Hepatology Fellow  Available on [employer] Teams  [phone] ([employer] Short Range Pager)  [phone] ([facility] Long Range Pager)    On Weekends/Holidays (All day) and Weekdays after 5 PM to 8AM:  For non-urgent consults, please email [email or web address] or [email or web address]  For emergent consults, please contact on call GI team.  See Amion schedule ([facility]), [employer] paging system ([facility]), or call hospital  ([facility])

## 2024-07-20 NOTE — PROGRESS NOTE ADULT - ASSESSMENT
Assessment	  Assessment: 77 yo male with metastatic neuroendocrine pancreatic cancer (tx on hold) and PMH of CAD s/p PCI x3 with most recent stent 6/12/24 on Plavix and eliquis , chronic Afib on eliquis, orthostatic hypotension on midodrine, PAD , recent admission for dx cath on 6/24/24 presented from PCP's office for hypotension despite taking AM midodrine dose on 7/2, admitted to medicine for symptomatic hypotension and RAZIA. Per chart, on 7/8 PM, pt was noted to have acute onset of melena x5 and coffee ground emesis x2-3. RRT was called on 7/9 AM for hypotension, hgb dropped from 9.2 to 7.4 (admission baseline 10-11), FOBT +, pt was started on PPI IV and transfused 1 unit of pRBC. GI was consulted and underwent EGD on 7/9 afternoon. MICU was consulted for uncontrolled bleeding during EGD. During EGD, pt became hypotensive and was given phenylephrine, had A-fib with RVR s/p amiodarone. Now s/p IR GDA embolization, admitted to MICU for further moniring i.s.o hemorrhagic shock 2/2 GI bleed. On repeat EGD, oozing but no active bleeding was noted. Hospital course c/b c.diff, and patient has been placed on vancomycin. Patient had melanotic stool 7/18 with Hgb drop 10.1 to 7.1 and received 2 units pRBC and 1 unit platelets. Patient was reintubated and underwent EGD 7/18 afternoon. GI found duodenal bleed. Patient now s/p 3x clips and epi.    Plan:     NEUROLOGIC  # Baseline AOx3  Course:  - intubated 7/9  - weaned off propofol onto precedex  - extubated 7/15  - reintubated 7/18 for EGD  Plan:  - Patient currently on precedex  - fentanyl prn for pain control (last dose 7/12/24)    CARDIOVASCULAR  # hemorrhagic shock   Course:  - RRT called 7/9 for hypotension   - 2/2 to Upper GI bleeds, urgently took to EGD, found bleeding ulcer that was not well controlled, underwent IR empiric embolization (7/9)  - s/p 5 pRBC prior to MICU, and 4 pRBC, 2 FFP, 2 platelets in MICU (7/17)  - Restarted plavix 7/13/24 for new stent (6/12/24) per GI  - on henny, maintain MAP > 65; took off vaso and precedex (7/13/24), he got tachy to 118. Added precedex back. vasopressin added 7/14  - per spouse, pt's baseline BP is a little bit over 100  - Albumin 5%  mL given 7/16  - Vasopressin stopped (7/17)  - On 7/18, patient had large melanotic stool with Hgb drop from 10.1-7.1, patient was reintubated for GI scope  - s/p 2pRBC, 1 platelet (7/18)  - total transfusions: 11pRBC, 3 platelet  Findings:  - AM cortisol 17 (7/5), AM cortisol 37.1 (7/11)  - POCUS ECHO (7/16) showed no cardiogenic shock, no tamponade, low SV indeterminate IVC, irregular B lines but not concerning for pulmonary edema, and some subdiaphragmatic fluid  Plan:  - Continue to monitor CBC  - Transfuse as needed  - Continue henny, levo, and vaso, try to wean (goal MAP >65)  - Continue to hold Plavix 75 mg  - IVF if not volume overloaded given recent stool patterns  - Continue midodrine 30mg q8  - D/c droxidopa 200mg q8    #CAD s/p PCI x3, most recent stent 6/12/24, NURIA to pLAD  Course:  - restarted plavix 7/13/24 for new stent (6/12/24) per GI   Plan:  - Continue to hold Plavix 75 mg  - Restart atorvastatin 80 mg daily    #PAD  # A-fib on eliquis  May have been worsened because of GI bleed  - s/p successful DCCV 10/31   - In RVR 7/16  - Cards recommended resuming home dose sotalol 40 mg PO (qTC wnl)  - s/p 1 dose sotalol but RVR persisted  - s/p 2 doses of amio overnight (7/16)  - attempted to restart home sotalol, but patient HR returned to 150s in afternoon (7/16)  - Amio restarted amio 150 mg followed by loading dose of 1 mg/min for 6 hours and 0.5 mg/min for 18 hours (7/16)  - received amio 150 overnight (7/17)  Plan:  - Hold eliquis   - Continue amio 400 mg q8 for 12 doses followed by 200 mg daily (first 400mg dose 1300 on 7/17)  - Hold home sotalol  - Cardiology following  - EKG    PULMONARY  #Intubated for airway protection prior to EGD/IR embolization  Course:  - Intubated 7/9  - Extubated 7/15  - Intubated again 7/18  Plan:  - Patient placed on CPAP/PSV  - Continue to monitor overall clinical status in considering extubation as patient was not intubated for pulmonary reasons    #Sputum production  - Patient complaining of cough and phlegm  - Patient able to self-suction (7/17)  Plan:  - Currently intubated  - Airway clearance protocol    RENAL/  #RAZIA   #ATN i.s.o hypotension  #metabolic acidosis   Findings:  - pH 7.37 (7/16)  - Cr 1.53 (7/16)  - AG 18 (7/17) with high glucose, concern for DKA but ABG pH 7.39, pCO2 26 pO2 87, pHCO3 16, Beta hydroxybutyrate 0 (7/17)  - AG 13 (7/18)  - clinically patient appears volume overloaded  Plan:  - if has AG again, ABG and if acidotic, consider BiPAP  - f/u nephro recs  - Continue sodium bicarb 1300 mg tid, added per nephro  - trend BUN/Cr   - monitor Is and Os   - Bumex 2mg IV given today    #Urinary retention  Plan:  - Started Doxazosin 2 mg daily (7/15)  - Monitor I/Os  - Ivory placed today by urology, making urine    GASTROINTESTINAL  #Upper GI Bleed  Course:  - s/p 5 units pRBC prior to MICU and then 4:2:2 on 7/12  - CT A/P 7/9 - Active bleeding in the third portion of the duodenum. Progression of liver metastases.  - EGD 7/9 showed esophagitis, One non-bleeding duodenal ulcer with a clean ulcer base (Arjun Class III). One oozing duodenal ulcer with spurting hemorrhage (Arjun Class Ia). Treatment not successful. Treated with bipolar cautery.  - s/p IR embolization on 7/9  - Patient had melanotic stool overnight (7/18) and hemoglobin drop 10.1 to 7.1 in 24 hours  - Reached out to surgery in case of recurrent bleeding per GI if unable to find/contain source during re-scope  - s/p EGD 7/19 showing duodenal bleed s/p 3 clips + epi  - Total EGD: 3  - Bloody BM 7/19 likely old blood  Plan:  - per GI recs, changed PPI gtt to PPI IV BID  - monitor Hgb, transfuse as needed   - hold eliquis   - continue to f/u with GI and IR     - Per GI, "Will need PPI BID for 8 weeks for grade D esophagitis and PUD"  - Follow up GI final recs after procedure    #Nutrition  Course:  - placed OG tube per GI and started ensure clear liquid (see note from RD 7/13/24)  - OG tube removed during extubation (7/15/24)  -dysphagia screen failed  - FEES (7/17) failed  - ENT consulted for vallecular lesion, diagnosed vallecular cyst, no inpatient intervention required, outpatient f/u  Plan:  - OG tube in place  - Diet per RD recs (placed 7/17)  - Speech and swallow to reevaluate overall status improves    #Diarrhea  Course:  - stopped maintenance fluids   - positive for c. diff (7/14)  - started vancomycin (7/14 -)   - having worsening diarrhea, no melena (7/16)  Plan:  - Continue vancomycin  - Fluids as necessary to compensate for volume loss 2/2 diarrhea    #Transaminitis (resolving)  Findings:  - Bili 1.1 (7/16) -> 1.3 (7/17)  - Alk P 359 (7/16)-> 484 (7/17)  -  (7/16)->129 (7/17)  - ALT 67 (7/16)-> 72 (7/17)  - RUQ US (7/17): known liver mets, no acute findings  Plan:  - Restart atorvastatin 80 mg daily    INFECTIOUS DISEASE  #leukocytosis  Findings/Course:  -  BCx 7/10 - NGTD  - Sputum Cx from ETT 7/13 showed numerous gram neg krishna (Klebsiella oxytoca/raoultella ornithinolytica)  - Started zosyn for empiric treatment (7/11 - 7/18)   - GI panel: previously indeterminate norovirus (7/8), negative (7/13)     - per ID- no intervention needed regarding the PCR results  - c. diff + (7/14)  - started vancomycin for c. diff (7/14- )  - febrile 7/16 overnight  - currently afebrile 7/17  - MRSA Swab: negative  - Staph aureus PCR positive  - Culture resistant to Zosyn (7/18)  - Switched Zosyn to Meropenem (7/18- )  - Patient febrile overnight (7/19)  Plan:  - Continue vancomycin 125 mg q6 for c. diff  - Switched to meropenem 1000 mg q8  - Re-sent cultures  - If persistent fevers, consider escalating vancomycin to 250mg q6 or adding flagyl  - Continue to monitor for fevers    ENDOCRINE  #adrenal insufficiency   Findings:  -7/5 cortisol 17   - 7/11 cortisol 37.1     HEMATOLOGY/ONCOLOGY   # neuroendocrine tumor   - was on lanreotide then had POD in liver and started on peptide receptor radiotherapy (PRRT)- typically given every 8 weeks for 4 doses. He last received it 10/20/2023   - PET 5/28/24 shows new somatostatin avid osseous lesions; decreased intensely avid right supradiaphragmatic and upper abdominal nodes, suspicious for mets  Plan:  - per heme/onc:    - can resume octreotide 150 mcg bid once infection r/o      - can check factor levels V, VIII, X     - "-- f/u with Dr. Sophia Prasad at [facility] [place] after discharge, no plan for treatment inpatient, if clinically improves/stabilizes then can be considered for treatment outpatient"    #DVT ppx  - SCDs  - LE duplex (7/17): negative for DVT    SKINS:   - Lines/Tubes - PIV, cordis replaced with central line (7/14)  Plan:  - Patient will need to keep a central line with current pressors    Ethics:   - Full Code   - GOC 7/12, spoke to spouse (Yamilet) over phone with palliative care team, discussed GOC again on 7/16    Consults this admission: GI, Heme Onc, Palliative, Endocrinology, Cardiology, Nephrology Assessment	  Assessment: 77 yo male with metastatic neuroendocrine pancreatic cancer (tx on hold) and PMH of CAD s/p PCI x3 with most recent stent 6/12/24 on Plavix and eliquis , chronic Afib on eliquis, orthostatic hypotension on midodrine, PAD , recent admission for dx cath on 6/24/24 presented from PCP's office for hypotension despite taking AM midodrine dose on 7/2, admitted to medicine for symptomatic hypotension and RAZIA. Per chart, on 7/8 PM, pt was noted to have acute onset of melena x5 and coffee ground emesis x2-3. RRT was called on 7/9 AM for hypotension, hgb dropped from 9.2 to 7.4 (admission baseline 10-11), FOBT +, pt was started on PPI IV and transfused 1 unit of pRBC. GI was consulted and underwent EGD on 7/9 afternoon. MICU was consulted for uncontrolled bleeding during EGD. During EGD, pt became hypotensive and was given phenylephrine, had A-fib with RVR s/p amiodarone. Now s/p IR GDA embolization, admitted to MICU for further moniring i.s.o hemorrhagic shock 2/2 GI bleed. On repeat EGD, oozing but no active bleeding was noted. Hospital course c/b c.diff, and patient has been placed on vancomycin. Patient had melanotic stool 7/18 with Hgb drop 10.1 to 7.1 and received 2 units pRBC and 1 unit platelets. Patient was reintubated and underwent EGD 7/18 afternoon. GI found duodenal bleed. Patient now s/p 3x clips and epi.    Plan:     NEUROLOGIC  # Baseline AOx3  Course:  - intubated 7/9  - weaned off propofol onto precedex  - extubated 7/15  - reintubated 7/18 for EGD  Plan:  - Patient currently on precedex  - fentanyl prn for pain control (last dose 7/12/24)    CARDIOVASCULAR  # hemorrhagic shock   Course:  - RRT called 7/9 for hypotension   - 2/2 to Upper GI bleeds, urgently took to EGD, found bleeding ulcer that was not well controlled, underwent IR empiric embolization (7/9)  - s/p 5 pRBC prior to MICU, and 4 pRBC, 2 FFP, 2 platelets in MICU (7/17)  - Restarted plavix 7/13/24 for new stent (6/12/24) per GI  - on henny, maintain MAP > 65; took off vaso and precedex (7/13/24), he got tachy to 118. Added precedex back. vasopressin added 7/14  - per spouse, pt's baseline BP is a little bit over 100  - Albumin 5%  mL given 7/16  - Vasopressin stopped (7/17)  - On 7/18, patient had large melanotic stool with Hgb drop from 10.1-7.1, patient was reintubated for GI scope  - s/p 2pRBC, 1 platelet (7/18)  - total transfusions: 11pRBC, 3 platelet  Findings:  - AM cortisol 17 (7/5), AM cortisol 37.1 (7/11)  - POCUS ECHO (7/16) showed no cardiogenic shock, no tamponade, low SV indeterminate IVC, irregular B lines but not concerning for pulmonary edema, and some subdiaphragmatic fluid  Plan:  - Continue to monitor CBC  - Transfuse as needed  - Continue henny, levo, and vaso, try to wean (goal MAP >65)  - Continue to hold Plavix 75 mg  - IVF if not volume overloaded given recent stool patterns  - Continue midodrine 30mg q8  - D/c droxidopa 200mg q8    #CAD s/p PCI x3, most recent stent 6/12/24, NURIA to pLAD  Course:  - restarted plavix 7/13/24 for new stent (6/12/24) per GI   Plan:  - Continue to hold Plavix 75 mg  - Restart atorvastatin 80 mg daily    #PAD  # A-fib on eliquis  May have been worsened because of GI bleed  - s/p successful DCCV 10/31   - In RVR 7/16  - Cards recommended resuming home dose sotalol 40 mg PO (qTC wnl)  - s/p 1 dose sotalol but RVR persisted  - s/p 2 doses of amio overnight (7/16)  - attempted to restart home sotalol, but patient HR returned to 150s in afternoon (7/16)  - Amio restarted amio 150 mg followed by loading dose of 1 mg/min for 6 hours and 0.5 mg/min for 18 hours (7/16)  - received amio 150 overnight (7/17)  Plan:  - Hold eliquis   - Continue amio 400 mg q8 for 12 doses followed by 200 mg daily (first 400mg dose 1300 on 7/17)  - Hold home sotalol  - Cardiology following  - EKG    PULMONARY  #Intubated for airway protection prior to EGD/IR embolization  Course:  - Intubated 7/9  - Extubated 7/15  - Intubated again 7/18  Plan:  - Patient placed on CPAP/PSV  - Continue to monitor overall clinical status in considering extubation as patient was not intubated for pulmonary reasons    #Sputum production  - Patient complaining of cough and phlegm  - Patient able to self-suction (7/17)  Plan:  - Currently intubated  - Airway clearance protocol    RENAL/  #RAZIA   #ATN i.s.o hypotension  #metabolic acidosis   Findings:  - pH 7.37 (7/16)  - Cr 1.53 (7/16)  - AG 18 (7/17) with high glucose, concern for DKA but ABG pH 7.39, pCO2 26 pO2 87, pHCO3 16, Beta hydroxybutyrate 0 (7/17)  - AG 13 (7/18)  - clinically patient appears volume overloaded  Plan:  - if has AG again, ABG and if acidotic, consider BiPAP  - f/u nephro recs  - Continue sodium bicarb 1300 mg tid, added per nephro  - trend BUN/Cr   - monitor Is and Os   - Bumex 2mg IV discontinued today    #Urinary retention  Plan:  - Started Doxazosin 2 mg daily (7/15)  - Monitor I/Os  - Ivory placed today by urology, making urine    GASTROINTESTINAL  #Upper GI Bleed  Course:  - s/p 5 units pRBC prior to MICU and then 4:2:2 on 7/12  - CT A/P 7/9 - Active bleeding in the third portion of the duodenum. Progression of liver metastases.  - EGD 7/9 showed esophagitis, One non-bleeding duodenal ulcer with a clean ulcer base (Arjun Class III). One oozing duodenal ulcer with spurting hemorrhage (Arjun Class Ia). Treatment not successful. Treated with bipolar cautery.  - s/p IR embolization on 7/9  - Patient had melanotic stool overnight (7/18) and hemoglobin drop 10.1 to 7.1 in 24 hours  - Reached out to surgery in case of recurrent bleeding per GI if unable to find/contain source during re-scope  - s/p EGD 7/19 showing duodenal bleed s/p 3 clips + epi  - Total EGD: 3  - Bloody BM 7/19 likely old blood  Plan:  - per GI recs, changed PPI gtt to PPI IV BID  - monitor Hgb, transfuse as needed   - hold eliquis   - continue to f/u with GI and IR     - Per GI, "Will need PPI BID for 8 weeks for grade D esophagitis and PUD"  - Follow up GI final recs after procedure    #Nutrition  Course:  - placed OG tube per GI and started ensure clear liquid (see note from RD 7/13/24)  - OG tube removed during extubation (7/15/24)  -dysphagia screen failed  - FEES (7/17) failed  - ENT consulted for vallecular lesion, diagnosed vallecular cyst, no inpatient intervention required, outpatient f/u  Plan:  - OG tube in place  - Diet per RD recs (placed 7/17)  - Speech and swallow to reevaluate overall status improves    #Diarrhea  Course:  - stopped maintenance fluids   - positive for c. diff (7/14)  - started vancomycin (7/14 -)   - having worsening diarrhea, no melena (7/16)  Plan:  - Continue vancomycin  - Fluids as necessary to compensate for volume loss 2/2 diarrhea    #Transaminitis (resolving)  Findings:  - Bili 1.1 (7/16) -> 1.3 (7/17)  - Alk P 359 (7/16)-> 484 (7/17)  -  (7/16)->129 (7/17)  - ALT 67 (7/16)-> 72 (7/17)  - RUQ US (7/17): known liver mets, no acute findings  Plan:  - Restart atorvastatin 80 mg daily    INFECTIOUS DISEASE  #leukocytosis  Findings/Course:  -  BCx 7/10 - NGTD  - Sputum Cx from ETT 7/13 showed numerous gram neg krishna (Klebsiella oxytoca/raoultella ornithinolytica)  - Started zosyn for empiric treatment (7/11 - 7/18)   - GI panel: previously indeterminate norovirus (7/8), negative (7/13)     - per ID- no intervention needed regarding the PCR results  - c. diff + (7/14)  - started vancomycin for c. diff (7/14- )  - febrile 7/16 overnight  - currently afebrile 7/17  - MRSA Swab: negative  - Staph aureus PCR positive  - Culture resistant to Zosyn (7/18)  - Switched Zosyn to Meropenem (7/18- )  - Patient febrile overnight (7/19)  Plan:  - Continue vancomycin 125 mg q6 for c. diff  - Switched to meropenem 1000 mg q8  - Re-sent cultures  - If persistent fevers, consider escalating vancomycin to 250mg q6 or adding flagyl  - Continue to monitor for fevers    ENDOCRINE  #adrenal insufficiency   Findings:  -7/5 cortisol 17   - 7/11 cortisol 37.1     HEMATOLOGY/ONCOLOGY   # neuroendocrine tumor   - was on lanreotide then had POD in liver and started on peptide receptor radiotherapy (PRRT)- typically given every 8 weeks for 4 doses. He last received it 10/20/2023   - PET 5/28/24 shows new somatostatin avid osseous lesions; decreased intensely avid right supradiaphragmatic and upper abdominal nodes, suspicious for mets  Plan:  - per heme/onc:    - can resume octreotide 150 mcg bid once infection r/o      - can check factor levels V, VIII, X     - "-- f/u with Dr. Sophia Prasad at [facility] [place], no plan for treatment inpatient, if clinically improves/stabilizes then can be considered for treatment outpatient"    #Normocytic anemia   - Pt HgB dropped from 9.0 -> 8.1  - ISO of GI bleed hx and continued melanotic BMs, there is c/o of rebleed   - CT angio abdomen pelvis ordered, f/u results   - Surgery and GI made aware & are following     #DVT ppx  - SCDs  - LE duplex (7/17): negative for DVT    SKINS:   - Lines/Tubes - PIV, cordis replaced with central line (7/14)  Plan:  - Patient will need to keep a central line with current pressors    Ethics:   - Full Code   - GOC 7/12, spoke to spouse (Yamilet) over phone with palliative care team, discussed GOC again on 7/16    Consults this admission: GI, Heme Onc, Palliative, Endocrinology, Cardiology, Nephrology

## 2024-07-20 NOTE — PROGRESS NOTE ADULT - ASSESSMENT
79 yo male with PMH of CAD s/p PCI x3 with most recent stent 6/12/24 on Plavix, chronic Afib on eliquis, orthostatic hypotension on midodrine, PAD, neuroendocrine tumor with RT currently on hold, recent admission for dx cath on 6/24/24 who presented for hypotension, admitted for GIB and hemorrhagic shock. Found to be bleeding from duodenum. Transferred to MICU, on pressors.     1. Pancreatic NET- metastatic   -- was on lanreotide then had POD in liver and started on peptide receptor radiotherapy (PRRT)- typically given every 8 weeks for 4 doses. He received one dose on 10/20/2023 and planned to get his 2nd dose at the end of December however his course was complicated by multiple hospitalizations that were noncancer related (decompensated heart failure).   -- 5/28/24 PET Dotatate (compared to 9/2023): several new somatostatin avid osseous lesions, some faintly sclerotic, suspicious for mets. Increased upper RP nodes, probably metastatic. Decreased intensely tracer avid right supradiaphragmatic and upper abdominal nodes, suspicious for metastasis. Redemonstrated intensely somatostatin avid bilobar hepatic lesions, consistent with metastases again morphologically difficult to delineate.   -- CT reviewed by [facility]: SINCE MAY 8 2024 INCREASED HEPATIC METASTASES, NEWLY SEEN PRIMARY TUMOR IN THE PANCREAS, and active bleeding within the duodenum with associated intraluminal hematoma.   -- chromogranin level is elevated at 2058, but no prior level at [facility] [place] for review   -- [place] [facility] [place] no plan for treatment inpatient, if clinically improves/stabilizes then can be considered for treatment outpatient    2. Duodenal bleed, Hemorrhagic shock  - Remains in MICU, intubated  - CT w/ bleed in duodenum. GI called, EGD 7/9 -> S/p  IR embolization 7/9, intubated in MICU -> s/p extubation 7/15 -> intubated 7/18  - s/p multiple transfusions, fibrinogen low would recommend Cry for < 100, but coags have improved as are essentially normal now so unlikely, probably was related to liver dysfunction.   - Once infection ruled out, may start octreotide 150 mcg BID  - s/p repeat EGD 7/12 showing duodenal lesions w/clots and oozing, no clear targets for intervention and no active bleeding, purastat applied to all areas of oozing.   - S/p EGD 7/18: duodenal ulcer with active oozing, ulcer with visible vessel. Bleeding treated.   - S/p one unit pRBC 7/19, hgb 9  - Per GI, high risk for rebleed, will continue PPI and remain NPO for now and monitor hemoglobin. Will consider surgery or IR eval if repeat bleed.    3. C. diff diarrhea  - on oral paulino Topete PA-C  Hematology/Oncology  [facility]  [phone] (office)

## 2024-07-20 NOTE — PROGRESS NOTE ADULT - ATTENDING COMMENTS
Patient is a 78M extensive history including CAD s/p stent (most recent 6/12/24), Afib on AC, orthostatic hypotension on midodrine, and pancreatic neuroendocrine tumor who presents with hypotension due to acute GI bleed. The patient has had worsening shock state and multiorgan failure.   --- Patient has had a deterioration on 7/18 with recurrent bleeding and acute blood loss anemia    #Neuro - lower sedation to monitor mental status  #Cardiovascular - shock state worsening and patient on 3 vasopressors this AM - lower as able with goal MAP > 65  - Hb dropped again and had 3 episodes of melanotic stool.  - Hold Plavix for now  - RVR while on Levo - continue Amio PO load. Tachycardia also improved while on Propofol  - PRBC transfusion if Hb drops <8  - Fluids today as IVC is collapsible.   - NURIA recently was off DAPT for few days in setting of bleeding but Plavix restarted 7/14. Given recurrent bleeding Plavix was stopped. At this point patient has now had recurrent bleeding in setting of being on Plavix  --- On 6/12/24 patient had PCI with NURIA to large RPL and patent LAD stent  - Restart statin  #Pulmonary - acute hypoxemic respiratory failure in setting of GI bleed  - Maintain O2 sat > 90%   - Extubated 7/15 but reintubated 7/18 for shock and GI bleed - continue mechanical ventilation and PSV as able  #Gastro - massive GIB s/p EGD x 3 and empiric GDA embolization on 7/9  - Most recent EGD 7/18 noted with duodenal ulcers and visible vessel which required interventions with successful cessation of bleeding  - Now bleeding again, and thus needs CT Angio. Surgery reconsulted. IR consult post CT Angio  - C. diff positive and on PO Vanco - monitor diarrhea  - Continue PPI  - Transaminitis - RUQ sono noted  - Oropharyngeal dysphagia - OGT feeds  #Nephro - acute renal failure due to ATN now improved but still with signs of peripheral edema.   - Attempt diuresis today - IVC large on POCUS and getting significant volume with vasopressors  - Ivory catheter placed by Urology overnight  #Infectious Disease - Continue antibiotics in setting of shock state and fevers  - Monitor temperature curve and follow-up cultures - ETT culture with Klebsiella   - C. diff positive - PO Vanco - diarrhea reportedly improved today  #Endo - monitor FS and adjust insulin as needed for goal FS < 180  #Hem/Onc - pancreatic neuroendocrine tumor  - Outpatient follow-up at [facility] [place] - per Onc seeing patient at [facility] there does appear to be progression of disease  #DVT proph - SCD  #GOC - Full Code    Prognosis guarded. MICU fellow spoke with patient's son at bedside this AM. yes

## 2024-07-20 NOTE — CHART NOTE - NSCHARTNOTEFT_GEN_A_CORE
: Maxine Luong ACNP     INDICATION: Shock     PROCEDURE:  [x ] LIMITED ECHO  [x] LIMITED CHEST  [ ] LIMITED RETROPERITONEAL  [ ] LIMITED ABDOMINAL  [ ] LIMITED DVT  [ ] NEEDLE GUIDANCE VASCULAR  [ ] NEEDLE GUIDANCE THORACENTESIS  [ ] NEEDLE GUIDANCE PARACENTESIS  [ ] NEEDLE GUIDANCE PERICARDIOCENTESIS  [ ] OTHER    FINDINGS:  Echo   RV smaller than LV   LV appears to be hyperdynamic   IVC 1.5 with moderate respiratory variation     Chest   Predominantly a lines to anterior chest wall   Left Pleural Base with moderate effusion and consolidation   Right Pleural base also with moderate effusion and consolidation (+) dynamic air bronchograms     Abd   (+) ascites       INTERPRETATION:  LV (+) hyperdynamic with IVC 1.5 with moderate respiratory variation monitor for acute blood loss anemia   (+) bilateral moderate pleural effusion with associated consolidation right consolidation with dynamic air bronchograms - PNA : Maxine Luong ACNP     INDICATION: Shock     PROCEDURE:  [x ] LIMITED ECHO  [x] LIMITED CHEST  [ ] LIMITED RETROPERITONEAL  [ ] LIMITED ABDOMINAL  [ ] LIMITED DVT  [ ] NEEDLE GUIDANCE VASCULAR  [ ] NEEDLE GUIDANCE THORACENTESIS  [ ] NEEDLE GUIDANCE PARACENTESIS  [ ] NEEDLE GUIDANCE PERICARDIOCENTESIS  [ ] OTHER    FINDINGS:  Echo   RV smaller than LV   LV appears to be hyperdynamic   IVC 1.5 with moderate respiratory variation     Chest   Predominantly a lines to anterior chest wall   Left Pleural Base with moderate effusion and consolidation   Right Pleural base also with moderate effusion and consolidation (+) dynamic air bronchograms     Abd   (+) ascites       INTERPRETATION:  LV (+) hyperdynamic with IVC 1.5 with moderate respiratory variation monitor for acute blood loss anemia   (+) bilateral moderate pleural effusion with associated consolidation right consolidation with dynamic air bronchograms - PNA      Attending Addendum:     I was present during the entire procedure and agree with the above findings and interpretation. TIme spent on the procedure was separate from the critical care time spent caring for the patient.     Ace Prasad MD

## 2024-07-20 NOTE — PROGRESS NOTE ADULT - SUBJECTIVE AND OBJECTIVE BOX
Gastroenterology/Hepatology Progress Note    Interval Events:   - Hgb stable   - continues with melena BM     Allergies:  No Known Allergies      Hospital Medications:  aMIOdarone    Tablet 400 milliGRAM(s) Oral every 8 hours  aMIOdarone    Tablet   Oral   atorvastatin 80 milliGRAM(s) Oral at bedtime  chlorhexidine 0.12% Liquid 15 milliLiter(s) Oral Mucosa every 12 hours  chlorhexidine 2% Cloths 1 Application(s) Topical <User Schedule>  dexMEDEtomidine Infusion 0.1 MICROgram(s)/kG/Hr IV Continuous <Continuous>  doxazosin 2 milliGRAM(s) Oral at bedtime  folic acid Injectable 1 milliGRAM(s) IV Push daily  insulin lispro (ADMELOG) corrective regimen sliding scale   SubCutaneous every 6 hours  meropenem  IVPB 1000 milliGRAM(s) IV Intermittent every 8 hours  meropenem  IVPB      midodrine 30 milliGRAM(s) Oral every 8 hours  mupirocin 2% Nasal 1 Application(s) Both Nostrils two times a day  norepinephrine Infusion 0.05 MICROgram(s)/kG/Min IV Continuous <Continuous>  pantoprazole Infusion 8 mG/Hr IV Continuous <Continuous>  phenylephrine    Infusion 0.1 MICROgram(s)/kG/Min IV Continuous <Continuous>  propofol Infusion 20 MICROgram(s)/kG/Min IV Continuous <Continuous>  sodium bicarbonate 1300 milliGRAM(s) Oral three times a day  thiamine Injectable 100 milliGRAM(s) IV Push daily  vancomycin    Solution 125 milliGRAM(s) Oral every 6 hours  vasopressin Infusion 0.04 Unit(s)/Min IV Continuous <Continuous>      ROS: 14 point ROS unable to assess given mental status     PHYSICAL EXAM:   Vital Signs:  Vital Signs Last 24 Hrs  T(C): 37.2 (20 Jul 2024 08:00), Max: 38.7 (19 Jul 2024 20:00)  T(F): 99 (20 Jul 2024 08:00), Max: 101.7 (19 Jul 2024 20:00)  HR: 116 (20 Jul 2024 10:45) (90 - 117)  BP: 93/54 (20 Jul 2024 04:30) (70/48 - 149/113)  BP(mean): 68 (20 Jul 2024 04:30) (55 - 127)  RR: 42 (20 Jul 2024 10:45) (15 - 47)  SpO2: 100% (20 Jul 2024 10:45) (84% - 100%)    Parameters below as of 19 Jul 2024 20:00  Patient On (Oxygen Delivery Method): ventilator      Daily     Daily     GENERAL: Intubate d  HEENT:  NCAT, no scleral icterus  CHEST: on ventilator   HEART:  RRR  ABDOMEN:  Soft, non-tender, non-distended, no masses  EXTREMITIES:  No cyanosis, clubbing, or edema  SKIN:  No rash/erythema/ecchymoses/petechiae/wounds/abscess/warm/dry  NEURO:  Alert and oriented x 0    LABS:                        9.0    19.26 )-----------( 107      ( 20 Jul 2024 07:54 )             27.4     Mean Cell Volume: 92.3 fl (07-20-24 @ 07:54)    07-20    145  |  116<H>  |  56<H>  ----------------------------<  113<H>  3.3<L>   |  14<L>  |  1.43<H>    Ca    7.6<L>      20 Jul 2024 00:47  Phos  3.8     07-20  Mg     1.6     07-20    TPro  4.4<L>  /  Alb  2.3<L>  /  TBili  0.8  /  DBili  x   /  AST  58<H>  /  ALT  45  /  AlkPhos  203<H>  07-20    LIVER FUNCTIONS - ( 20 Jul 2024 00:47 )  Alb: 2.3 g/dL / Pro: 4.4 g/dL / ALK PHOS: 203 U/L / ALT: 45 U/L / AST: 58 U/L / GGT: x           PT/INR - ( 20 Jul 2024 00:46 )   PT: 12.5 sec;   INR: 1.14 ratio         PTT - ( 20 Jul 2024 00:46 )  PTT:29.1 sec  Urinalysis Basic - ( 20 Jul 2024 00:47 )    Color: x / Appearance: x / SG: x / pH: x  Gluc: 113 mg/dL / Ketone: x  / Bili: x / Urobili: x   Blood: x / Protein: x / Nitrite: x   Leuk Esterase: x / RBC: x / WBC x   Sq Epi: x / Non Sq Epi: x / Bacteria: x            Imaging:

## 2024-07-20 NOTE — PROGRESS NOTE ADULT - SUBJECTIVE AND OBJECTIVE BOX
Patient is a 78y old  Male who presents with a chief complaint of hypotension (20 Jul 2024 11:35)    Patient seen and examined, intubated in the MICU.  MEDICATIONS  (STANDING):  aMIOdarone    Tablet   Oral   aMIOdarone    Tablet 400 milliGRAM(s) Oral every 8 hours  atorvastatin 80 milliGRAM(s) Oral at bedtime  chlorhexidine 0.12% Liquid 15 milliLiter(s) Oral Mucosa every 12 hours  chlorhexidine 2% Cloths 1 Application(s) Topical <User Schedule>  dexMEDEtomidine Infusion 0.1 MICROgram(s)/kG/Hr (2.47 mL/Hr) IV Continuous <Continuous>  doxazosin 2 milliGRAM(s) Oral at bedtime  folic acid Injectable 1 milliGRAM(s) IV Push daily  insulin lispro (ADMELOG) corrective regimen sliding scale   SubCutaneous every 6 hours  meropenem  IVPB 1000 milliGRAM(s) IV Intermittent every 8 hours  meropenem  IVPB      midodrine 30 milliGRAM(s) Oral every 8 hours  mupirocin 2% Nasal 1 Application(s) Both Nostrils two times a day  norepinephrine Infusion 0.05 MICROgram(s)/kG/Min (9.25 mL/Hr) IV Continuous <Continuous>  pantoprazole Infusion 8 mG/Hr (10 mL/Hr) IV Continuous <Continuous>  phenylephrine    Infusion 0.1 MICROgram(s)/kG/Min (1.85 mL/Hr) IV Continuous <Continuous>  propofol Infusion 20 MICROgram(s)/kG/Min (11.8 mL/Hr) IV Continuous <Continuous>  sodium bicarbonate 1300 milliGRAM(s) Oral three times a day  thiamine Injectable 100 milliGRAM(s) IV Push daily  vancomycin    Solution 125 milliGRAM(s) Oral every 6 hours  vasopressin Infusion 0.04 Unit(s)/Min (6 mL/Hr) IV Continuous <Continuous>    MEDICATIONS  (PRN):    Vital Signs Last 24 Hrs  T(C): 37.2 (20 Jul 2024 08:00), Max: 38.7 (19 Jul 2024 20:00)  T(F): 99 (20 Jul 2024 08:00), Max: 101.7 (19 Jul 2024 20:00)  HR: 122 (20 Jul 2024 11:30) (90 - 122)  BP: 93/54 (20 Jul 2024 04:30) (70/48 - 149/113)  BP(mean): 68 (20 Jul 2024 04:30) (55 - 127)  RR: 51 (20 Jul 2024 11:30) (15 - 58)  SpO2: 100% (20 Jul 2024 11:30) (84% - 100%)    Parameters below as of 19 Jul 2024 20:00  Patient On (Oxygen Delivery Method): ventilator    PE  NAD  Intubated  MMM  No c/c/e  No rash grossly                        9.0    19.26 )-----------( 107      ( 20 Jul 2024 07:54 )             27.4     07-20    145  |  116<H>  |  56<H>  ----------------------------<  113<H>  3.3<L>   |  14<L>  |  1.43<H>    Ca    7.6<L>      20 Jul 2024 00:47  Phos  3.8     07-20  Mg     1.6     07-20    TPro  4.4<L>  /  Alb  2.3<L>  /  TBili  0.8  /  DBili  x   /  AST  58<H>  /  ALT  45  /  AlkPhos  203<H>  07-20

## 2024-07-20 NOTE — PROGRESS NOTE ADULT - SUBJECTIVE AND OBJECTIVE BOX
[facility]   [phone]  RENAL FOLLOW UP NOTE  --------------------------------------------------------------------------------  Chief Complaint:    24 hour events/subjective:    seen earlier   intubated on pressors     PAST HISTORY  --------------------------------------------------------------------------------  No significant changes to PMH, PSH, FHx, SHx, unless otherwise noted    ALLERGIES & MEDICATIONS  --------------------------------------------------------------------------------  Allergies    No Known Allergies    Intolerances      Standing Inpatient Medications  aMIOdarone    Tablet 400 milliGRAM(s) Oral every 8 hours  aMIOdarone    Tablet   Oral   atorvastatin 80 milliGRAM(s) Oral at bedtime  chlorhexidine 0.12% Liquid 15 milliLiter(s) Oral Mucosa every 12 hours  chlorhexidine 2% Cloths 1 Application(s) Topical <User Schedule>  dexMEDEtomidine Infusion 0.1 MICROgram(s)/kG/Hr IV Continuous <Continuous>  doxazosin 2 milliGRAM(s) Oral at bedtime  folic acid Injectable 1 milliGRAM(s) IV Push daily  insulin lispro (ADMELOG) corrective regimen sliding scale   SubCutaneous every 6 hours  meropenem  IVPB      meropenem  IVPB 1000 milliGRAM(s) IV Intermittent every 8 hours  midodrine 30 milliGRAM(s) Oral every 8 hours  mupirocin 2% Nasal 1 Application(s) Both Nostrils two times a day  norepinephrine Infusion 0.05 MICROgram(s)/kG/Min IV Continuous <Continuous>  pantoprazole Infusion 8 mG/Hr IV Continuous <Continuous>  phenylephrine    Infusion 0.1 MICROgram(s)/kG/Min IV Continuous <Continuous>  propofol Infusion 20 MICROgram(s)/kG/Min IV Continuous <Continuous>  sodium bicarbonate 1300 milliGRAM(s) Oral three times a day  thiamine Injectable 100 milliGRAM(s) IV Push daily  vancomycin    Solution 125 milliGRAM(s) Oral every 6 hours  vasopressin Infusion 0.04 Unit(s)/Min IV Continuous <Continuous>    PRN Inpatient Medications      REVIEW OF SYSTEMS  --------------------------------------------------------------------------------        VITALS/PHYSICAL EXAM  --------------------------------------------------------------------------------  T(C): 36.6 (07-20-24 @ 20:00), Max: 38.1 (07-20-24 @ 00:00)  HR: 123 (07-20-24 @ 20:45) (90 - 131)  BP: 93/54 (07-20-24 @ 04:30) (70/48 - 126/68)  RR: 27 (07-20-24 @ 20:45) (15 - 32)  SpO2: 100% (07-20-24 @ 20:45) (96% - 100%)  Wt(kg): --        07-19-24 @ 07:01  -  07-20-24 @ 07:00  --------------------------------------------------------  IN: 2987.1 mL / OUT: 1985 mL / NET: 1002.1 mL    07-20-24 @ 07:01  -  07-20-24 @ 22:30  --------------------------------------------------------  IN: 1702.3 mL / OUT: 707 mL / NET: 995.3 mL      Physical Exam:  	      Gen:  intubated   	Pulm: decrease bs  no rales  +  ronchi  -  wheezing  	CV: No JVD. RRR, S1S2; no rub  	Abd: +BS,  + ascites and distended  	: No bladder distention   	UE: Warm, no cyanosis  no clubbing,  no edema  	LE: Warm, no cyanosis  no clubbing,  4+  edema    LABS/STUDIES  --------------------------------------------------------------------------------              7.6    17.51 >-----------<  112      [07-20-24 @ 17:22]              23.9     146  |  116  |  58  ----------------------------<  169      [07-20-24 @ 12:57]  3.7   |  14  |  1.41        Ca     7.3     [07-20-24 @ 12:57]      Mg     1.8     [07-20-24 @ 12:57]      Phos  4.3     [07-20-24 @ 12:57]    TPro  4.1  /  Alb  2.1  /  TBili  0.8  /  DBili  x   /  AST  50  /  ALT  39  /  AlkPhos  177  [07-20-24 @ 12:57]    PT/INR: PT 12.2 , INR 1.17       [07-20-24 @ 12:57]  PTT: 28.6       [07-20-24 @ 12:57]          [07-20-24 @ 00:47]    Creatinine Trend:  SCr 1.41 [07-20 @ 12:57]  SCr 1.43 [07-20 @ 00:47]  SCr 1.40 [07-19 @ 06:31]  SCr 1.41 [07-19 @ 00:42]  SCr 1.30 [07-18 @ 00:21]        TSH 2.47      [07-04-24 @ 12:31]

## 2024-07-20 NOTE — PROGRESS NOTE ADULT - SUBJECTIVE AND OBJECTIVE BOX
INTERVAL HPI/OVERNIGHT EVENTS:    SUBJECTIVE: Patient seen and examined at bedside.       VITAL SIGNS:  ICU Vital Signs Last 24 Hrs  T(C): 37.2 (20 Jul 2024 08:00), Max: 38.7 (19 Jul 2024 20:00)  T(F): 99 (20 Jul 2024 08:00), Max: 101.7 (19 Jul 2024 20:00)  HR: 124 (20 Jul 2024 12:00) (90 - 124)  BP: 93/54 (20 Jul 2024 04:30) (70/48 - 126/68)  BP(mean): 68 (20 Jul 2024 04:30) (55 - 85)  ABP: 106/49 (20 Jul 2024 12:00) (78/42 - 120/66)  ABP(mean): 66 (20 Jul 2024 12:00) (54 - 87)  RR: 62 (20 Jul 2024 12:00) (15 - 62)  SpO2: 100% (20 Jul 2024 12:00) (84% - 100%)    O2 Parameters below as of 19 Jul 2024 20:00  Patient On (Oxygen Delivery Method): ventilator          Mode: AC/ CMV (Assist Control/ Continuous Mandatory Ventilation), RR (machine): 16, TV (machine): 450, FiO2: 30, PEEP: 5, ITime: 1, MAP: 9, PIP: 14  Plateau pressure:   P/F ratio:     07-19 @ 07:01  -  07-20 @ 07:00  --------------------------------------------------------  IN: 2987.1 mL / OUT: 1985 mL / NET: 1002.1 mL    07-20 @ 07:01  -  07-20 @ 14:14  --------------------------------------------------------  IN: 480.2 mL / OUT: 225 mL / NET: 255.2 mL      CAPILLARY BLOOD GLUCOSE      POCT Blood Glucose.: 175 mg/dL (20 Jul 2024 12:17)    ECG:    PHYSICAL EXAM:    General:   HEENT:   Neck:   Respiratory:   Cardiovascular:   Abdomen:   Extremities:  Neurological:    MEDICATIONS:  MEDICATIONS  (STANDING):  aMIOdarone    Tablet   Oral   aMIOdarone    Tablet 400 milliGRAM(s) Oral every 8 hours  atorvastatin 80 milliGRAM(s) Oral at bedtime  chlorhexidine 0.12% Liquid 15 milliLiter(s) Oral Mucosa every 12 hours  chlorhexidine 2% Cloths 1 Application(s) Topical <User Schedule>  dexMEDEtomidine Infusion 0.1 MICROgram(s)/kG/Hr (2.47 mL/Hr) IV Continuous <Continuous>  doxazosin 2 milliGRAM(s) Oral at bedtime  folic acid Injectable 1 milliGRAM(s) IV Push daily  insulin lispro (ADMELOG) corrective regimen sliding scale   SubCutaneous every 6 hours  meropenem  IVPB 1000 milliGRAM(s) IV Intermittent every 8 hours  meropenem  IVPB      midodrine 30 milliGRAM(s) Oral every 8 hours  mupirocin 2% Nasal 1 Application(s) Both Nostrils two times a day  norepinephrine Infusion 0.05 MICROgram(s)/kG/Min (9.25 mL/Hr) IV Continuous <Continuous>  pantoprazole Infusion 8 mG/Hr (10 mL/Hr) IV Continuous <Continuous>  phenylephrine    Infusion 0.1 MICROgram(s)/kG/Min (1.85 mL/Hr) IV Continuous <Continuous>  propofol Infusion 20 MICROgram(s)/kG/Min (11.8 mL/Hr) IV Continuous <Continuous>  sodium bicarbonate 1300 milliGRAM(s) Oral three times a day  thiamine Injectable 100 milliGRAM(s) IV Push daily  vancomycin    Solution 125 milliGRAM(s) Oral every 6 hours  vasopressin Infusion 0.04 Unit(s)/Min (6 mL/Hr) IV Continuous <Continuous>    MEDICATIONS  (PRN):      ALLERGIES:  Allergies    No Known Allergies    Intolerances        LABS:                        8.1    17.70 )-----------( 108      ( 20 Jul 2024 12:57 )             23.8     07-20    146<H>  |  116<H>  |  58<H>  ----------------------------<  169<H>  3.7   |  14<L>  |  1.41<H>    Ca    7.3<L>      20 Jul 2024 12:57  Phos  4.3     07-20  Mg     1.8     07-20    TPro  4.1<L>  /  Alb  2.1<L>  /  TBili  0.8  /  DBili  x   /  AST  50<H>  /  ALT  39  /  AlkPhos  177<H>  07-20    PT/INR - ( 20 Jul 2024 12:57 )   PT: 12.2 sec;   INR: 1.17 ratio         PTT - ( 20 Jul 2024 12:57 )  PTT:28.6 sec  Urinalysis Basic - ( 20 Jul 2024 12:57 )    Color: x / Appearance: x / SG: x / pH: x  Gluc: 169 mg/dL / Ketone: x  / Bili: x / Urobili: x   Blood: x / Protein: x / Nitrite: x   Leuk Esterase: x / RBC: x / WBC x   Sq Epi: x / Non Sq Epi: x / Bacteria: x        RADIOLOGY & ADDITIONAL TESTS: Reviewed.   INTERVAL HPI/OVERNIGHT EVENTS:    SUBJECTIVE: Patient seen and examined at bedside. Patient spiked a fever of 100.6 F at midnight on 7/20 and blood cultures were sent out. Overnight pt was started on Levo due to steady hypotension. Patient continues to have melanotic BMs with a HgB drop from 9.0 -> 8.1 (7/20).       VITAL SIGNS:  ICU Vital Signs Last 24 Hrs  T(C): 37.2 (20 Jul 2024 08:00), Max: 38.7 (19 Jul 2024 20:00)  T(F): 99 (20 Jul 2024 08:00), Max: 101.7 (19 Jul 2024 20:00)  HR: 124 (20 Jul 2024 12:00) (90 - 124)  BP: 93/54 (20 Jul 2024 04:30) (70/48 - 126/68)  BP(mean): 68 (20 Jul 2024 04:30) (55 - 85)  ABP: 106/49 (20 Jul 2024 12:00) (78/42 - 120/66)  ABP(mean): 66 (20 Jul 2024 12:00) (54 - 87)  RR: 62 (20 Jul 2024 12:00) (15 - 62)  SpO2: 100% (20 Jul 2024 12:00) (84% - 100%)    O2 Parameters below as of 19 Jul 2024 20:00  Patient On (Oxygen Delivery Method): ventilator          Mode: AC/ CMV (Assist Control/ Continuous Mandatory Ventilation), RR (machine): 16, TV (machine): 450, FiO2: 30, PEEP: 5, ITime: 1, MAP: 9, PIP: 14  Plateau pressure:   P/F ratio:     07-19 @ 07:01  -  07-20 @ 07:00  --------------------------------------------------------  IN: 2987.1 mL / OUT: 1985 mL / NET: 1002.1 mL    07-20 @ 07:01  -  07-20 @ 14:14  --------------------------------------------------------  IN: 480.2 mL / OUT: 225 mL / NET: 255.2 mL      CAPILLARY BLOOD GLUCOSE      POCT Blood Glucose.: 175 mg/dL (20 Jul 2024 12:17)    ECG:      General: no acute distress  HEENT: intubated  Respiratory: CTA BL, no wheezes, no ronchi  Cardiovascular: S1S2 RRR, no murmurs  Abdomen: soft, NT, ND  Neurological: sedated    MEDICATIONS:  MEDICATIONS  (STANDING):  aMIOdarone    Tablet   Oral   aMIOdarone    Tablet 400 milliGRAM(s) Oral every 8 hours  atorvastatin 80 milliGRAM(s) Oral at bedtime  chlorhexidine 0.12% Liquid 15 milliLiter(s) Oral Mucosa every 12 hours  chlorhexidine 2% Cloths 1 Application(s) Topical <User Schedule>  dexMEDEtomidine Infusion 0.1 MICROgram(s)/kG/Hr (2.47 mL/Hr) IV Continuous <Continuous>  doxazosin 2 milliGRAM(s) Oral at bedtime  folic acid Injectable 1 milliGRAM(s) IV Push daily  insulin lispro (ADMELOG) corrective regimen sliding scale   SubCutaneous every 6 hours  meropenem  IVPB 1000 milliGRAM(s) IV Intermittent every 8 hours  meropenem  IVPB      midodrine 30 milliGRAM(s) Oral every 8 hours  mupirocin 2% Nasal 1 Application(s) Both Nostrils two times a day  norepinephrine Infusion 0.05 MICROgram(s)/kG/Min (9.25 mL/Hr) IV Continuous <Continuous>  pantoprazole Infusion 8 mG/Hr (10 mL/Hr) IV Continuous <Continuous>  phenylephrine    Infusion 0.1 MICROgram(s)/kG/Min (1.85 mL/Hr) IV Continuous <Continuous>  propofol Infusion 20 MICROgram(s)/kG/Min (11.8 mL/Hr) IV Continuous <Continuous>  sodium bicarbonate 1300 milliGRAM(s) Oral three times a day  thiamine Injectable 100 milliGRAM(s) IV Push daily  vancomycin    Solution 125 milliGRAM(s) Oral every 6 hours  vasopressin Infusion 0.04 Unit(s)/Min (6 mL/Hr) IV Continuous <Continuous>    MEDICATIONS  (PRN):      ALLERGIES:  Allergies    No Known Allergies    Intolerances        LABS:                        8.1    17.70 )-----------( 108      ( 20 Jul 2024 12:57 )             23.8     07-20    146<H>  |  116<H>  |  58<H>  ----------------------------<  169<H>  3.7   |  14<L>  |  1.41<H>    Ca    7.3<L>      20 Jul 2024 12:57  Phos  4.3     07-20  Mg     1.8     07-20    TPro  4.1<L>  /  Alb  2.1<L>  /  TBili  0.8  /  DBili  x   /  AST  50<H>  /  ALT  39  /  AlkPhos  177<H>  07-20    PT/INR - ( 20 Jul 2024 12:57 )   PT: 12.2 sec;   INR: 1.17 ratio         PTT - ( 20 Jul 2024 12:57 )  PTT:28.6 sec  Urinalysis Basic - ( 20 Jul 2024 12:57 )    Color: x / Appearance: x / SG: x / pH: x  Gluc: 169 mg/dL / Ketone: x  / Bili: x / Urobili: x   Blood: x / Protein: x / Nitrite: x   Leuk Esterase: x / RBC: x / WBC x   Sq Epi: x / Non Sq Epi: x / Bacteria: x        RADIOLOGY & ADDITIONAL TESTS: Reviewed.

## 2024-07-20 NOTE — PROGRESS NOTE ADULT - ATTENDING COMMENTS
Still with melena - not unexpected with severe duodenal bleed within 48 hours of hemostasis  Did have drop in hgb yesterday, responded to transfusion and stable overnight  He is still high risk of rebleed within 30 days (10-20% risk in best situation, and given this is 3rd endoscopic attempt and s/p IR - much higher risk)  c/w Iv ppi drip  npo for now, can advance to  tom if stable hgb again  rest of care as per ICU  If repeat bleed and/or unstable - surgical or IR intervention may need to be considered.  Depending on timing/presentation, repeat endoscopic evaluation can be considered    GI to follow

## 2024-07-21 NOTE — PROGRESS NOTE ADULT - ASSESSMENT
Assessment	  Assessment: 79 yo male with metastatic neuroendocrine pancreatic cancer (tx on hold) and PMH of CAD s/p PCI x3 with most recent stent 6/12/24 on Plavix and eliquis , chronic Afib on eliquis, orthostatic hypotension on midodrine, PAD , recent admission for dx cath on 6/24/24 presented from PCP's office for hypotension despite taking AM midodrine dose on 7/2, admitted to medicine for symptomatic hypotension and RAZIA. Per chart, on 7/8 PM, pt was noted to have acute onset of melena x5 and coffee ground emesis x2-3. RRT was called on 7/9 AM for hypotension, hgb dropped from 9.2 to 7.4 (admission baseline 10-11), FOBT +, pt was started on PPI IV and transfused 1 unit of pRBC. GI was consulted and underwent EGD on 7/9 afternoon. MICU was consulted for uncontrolled bleeding during EGD. During EGD, pt became hypotensive and was given phenylephrine, had A-fib with RVR s/p amiodarone. Now s/p IR GDA embolization, admitted to MICU for further moniring i.s.o hemorrhagic shock 2/2 GI bleed. On repeat EGD, oozing but no active bleeding was noted. Hospital course c/b c.diff, and patient has been placed on vancomycin. Patient had melanotic stool 7/18 with Hgb drop 10.1 to 7.1 and received 2 units pRBC and 1 unit platelets. Patient was reintubated and underwent EGD 7/18 afternoon. GI found duodenal bleed. Patient now s/p 3x clips and epi.    Plan:     NEUROLOGIC  # Baseline AOx3  Course:  - intubated 7/9  - weaned off propofol onto precedex  - extubated 7/15  - reintubated 7/18 for EGD  Plan:  - Patient currently on precedex  - fentanyl prn for pain control (last dose 7/12/24)    CARDIOVASCULAR  # hemorrhagic shock   Course:  - RRT called 7/9 for hypotension   - 2/2 to Upper GI bleeds, urgently took to EGD, found bleeding ulcer that was not well controlled, underwent IR empiric embolization (7/9)  - s/p 5 pRBC prior to MICU, and 4 pRBC, 2 FFP, 2 platelets in MICU (7/17)  - Restarted plavix 7/13/24 for new stent (6/12/24) per GI  - on henny, maintain MAP > 65; took off vaso and precedex (7/13/24), he got tachy to 118. Added precedex back. vasopressin added 7/14  - per spouse, pt's baseline BP is a little bit over 100  - Albumin 5%  mL given 7/16  - Vasopressin stopped (7/17)  - On 7/18, patient had large melanotic stool with Hgb drop from 10.1-7.1, patient was restarted on henny, vaso, and levo for reintubation for GI scope  - s/p 2pRBC, 1 platelet (7/18)  - s/p 1 pRBC (7/20)  - total transfusions: 12pRBC, 3 platelet  Findings:  - AM cortisol 17 (7/5), AM cortisol 37.1 (7/11)  - POCUS ECHO (7/16) showed no cardiogenic shock, no tamponade, low SV indeterminate IVC, irregular B lines but not concerning for pulmonary edema, and some subdiaphragmatic fluid  Plan:  - Continue to monitor CBC  - Transfuse for Hgb <8  - Continue henny, levo, and vaso, try to wean (goal MAP >65)  - Continue to hold Plavix 75 mg  - IVF if not volume overloaded given recent stool patterns  - Continue midodrine 30mg q8  - D/c droxidopa 200mg q8    #CAD s/p PCI x3, most recent stent 6/12/24, NURIA to pLAD  Course:  - restarted plavix 7/13/24 for new stent (6/12/24) per GI   Plan:  - Continue to hold Plavix 75 mg  - Continue atorvastatin 80 mg daily    #PAD  # A-fib on eliquis  May have been worsened because of GI bleed  - s/p successful DCCV 10/31   - In RVR 7/16  - Cards recommended resuming home dose sotalol 40 mg PO (qTC wnl)  - s/p 1 dose sotalol but RVR persisted  - s/p 2 doses of amio overnight (7/16)  - attempted to restart home sotalol, but patient HR returned to 150s in afternoon (7/16)  - Amio restarted amio 150 mg followed by loading dose of 1 mg/min for 6 hours and 0.5 mg/min for 18 hours (7/16)  - received amio 150 overnight (7/17)  Plan:  - Hold eliquis   - Continue amio 400 mg q8 for 12 doses followed by 200 mg daily (first 400mg dose 1300 on 7/17)  - Hold home sotalol  - Cardiology following    PULMONARY  #Intubated for airway protection prior to EGD/IR embolization  Course:  - Intubated 7/9  - Extubated 7/15  - Intubated again 7/18  Plan:  - Patient placed on CPAP/PSV  - Continue to monitor overall clinical status in considering extubation as patient was not intubated for pulmonary reasons    #Sputum production (NA)  - Patient complaining of cough and phlegm  - Patient able to self-suction (7/17)  Plan:  - Currently intubated  - Airway clearance protocol    RENAL/  #RAZIA   #ATN i.s.o hypotension  #metabolic acidosis   Findings:  - pH 7.37 (7/16)  - Cr 1.53 (7/16)  - AG 18 (7/17) with high glucose, concern for DKA but ABG pH 7.39, pCO2 26 pO2 87, pHCO3 16, Beta hydroxybutyrate 0 (7/17)  - AG 13 (7/18)  - ABG pH 7.33, pCO2 30, pO2 136, HCO3 16 (7/21)  Plan:  - f/u nephro recs  - Increase sodium bicarb 1950 mg tid, per nephro  - Per nephro, patient is not a dialysis candidate  - trend BUN/Cr   - monitor Is and Os   - Bumex 2mg IV discontinued    #Urinary retention  Plan:  - Started Doxazosin 2 mg daily (7/15)  - Monitor I/Os  - Ivory placed today by urology, making urine    GASTROINTESTINAL  #Upper GI Bleed  Course:  - s/p 5 units pRBC prior to MICU and then 4:2:2 on 7/12  - CT A/P 7/9 - Active bleeding in the third portion of the duodenum. Progression of liver metastases.  - EGD 7/9 showed esophagitis, One non-bleeding duodenal ulcer with a clean ulcer base (Arjun Class III). One oozing duodenal ulcer with spurting hemorrhage (Arjun Class Ia). Treatment not successful. Treated with bipolar cautery.  - s/p IR embolization on 7/9  - Patient had melanotic stool overnight (7/18) and hemoglobin drop 10.1 to 7.1 in 24 hours  - Reached out to surgery in case of recurrent bleeding per GI if unable to find/contain source during re-scope  - s/p EGD 7/19 showing duodenal bleed s/p 3 clips + epi  - Total EGD: 3  - Bloody BM 7/19 likely old blood  - Patient continues to have melatonic stools with Hgb drop  Plan:  - per GI recs, changed PPI gtt to PPI IV BID  - monitor Hgb, transfuse as needed for Hgb <8  - hold eliquis   - continue to f/u with GI and IR     - Per GI, "Will need PPI BID for 8 weeks for grade D esophagitis and PUD"    - Per GI, high risk of 30 day rebleed (>10-20% risk)  - F/u CT Angio  - IR consult post-CT angio  - Surgery Reconsulted and aware    #Nutrition  Course:  - placed OG tube per GI and started ensure clear liquid (see note from RD 7/13/24)  - OG tube removed during extubation (7/15/24)  -dysphagia screen failed  - FEES (7/17) failed  - ENT consulted for vallecular lesion, diagnosed vallecular cyst, no inpatient intervention required, outpatient f/u  Plan:  - OG tube in place  - Diet per RD recs (placed 7/17)  - Speech and swallow to reevaluate overall status improves  - Per GI, resume tube feeds today (7/21) if hemoglobin stable    #Diarrhea  Course:  - stopped maintenance fluids   - positive for c. diff (7/14)  - started vancomycin (7/14 -)   - having worsening diarrhea, no melena (7/16)  Plan:  - Continue vancomycin  - Fluids as necessary to compensate for volume loss 2/2 diarrhea    #Transaminitis (resolving)  Findings:  - Bili 1.1 (7/16) -> 1.3 (7/17)  - Alk P 359 (7/16)-> 484 (7/17)  -  (7/16)->129 (7/17)  - ALT 67 (7/16)-> 72 (7/17)  - RUQ US (7/17): known liver mets, no acute findings  Plan:  - Restart atorvastatin 80 mg daily    INFECTIOUS DISEASE  #leukocytosis  Findings/Course:  -  BCx 7/10 - NGTD  - Sputum Cx from ETT 7/13 showed numerous gram neg krishna (Klebsiella oxytoca/raoultella ornithinolytica)  - Started zosyn for empiric treatment (7/11 - 7/18)   - GI panel: previously indeterminate norovirus (7/8), negative (7/13)     - per ID- no intervention needed regarding the PCR results  - c. diff + (7/14)  - started vancomycin for c. diff (7/14- )  - febrile 7/16 overnight  - currently afebrile 7/17  - MRSA Swab: negative  - Staph aureus PCR positive  - Culture resistant to Zosyn (7/18)  - Switched Zosyn to Meropenem (7/18- )  - Patient febrile overnight (7/19)  Plan:  - Continue vancomycin 125 mg q6 for c. diff  - Switched to meropenem 1000 mg q8  - Re-sent cultures  - If persistent fevers, consider escalating vancomycin to 250mg q6 or adding flagyl  - Continue to monitor for fevers    ENDOCRINE  #adrenal insufficiency   Findings:  -7/5 cortisol 17   - 7/11 cortisol 37.1     HEMATOLOGY/ONCOLOGY   # neuroendocrine tumor   - was on lanreotide then had POD in liver and started on peptide receptor radiotherapy (PRRT)- typically given every 8 weeks for 4 doses. He last received it 10/20/2023   - PET 5/28/24 shows new somatostatin avid osseous lesions; decreased intensely avid right supradiaphragmatic and upper abdominal nodes, suspicious for mets  Plan:  - per heme/onc:    - can resume octreotide 150 mcg bid once infection r/o      - can check factor levels V, VIII, X     - "-- f/u with Dr. Sophia Prasad at [facility] [place], no plan for treatment inpatient, if clinically improves/stabilizes then can be considered for treatment outpatient"    #Normocytic anemia   - Pt HgB dropped from 9.0 -> 8.1  - ISO of GI bleed hx and continued melanotic BMs, there is c/o of rebleed   Plan:  - CT angio abdomen pelvis ordered, f/u results   - Surgery and GI made aware & are following     #DVT ppx  - SCDs  - LE duplex (7/17): negative for DVT    SKINS:   - Lines/Tubes - PIV, cordis replaced with central line (7/14), A line (7/20)    Ethics:   - Full Code   - GOC 7/12, spoke to spouse (Yamilet) over phone with palliative care team, discussed GOC again on 7/16    Consults this admission: GI, Heme Onc, Palliative, Endocrinology, Cardiology, Nephrology   Assessment	  Assessment: 77 yo male with metastatic neuroendocrine pancreatic cancer (tx on hold) and PMH of CAD s/p PCI x3 with most recent stent 6/12/24 on Plavix and eliquis , chronic Afib on eliquis, orthostatic hypotension on midodrine, PAD , recent admission for dx cath on 6/24/24 presented from PCP's office for hypotension despite taking AM midodrine dose on 7/2, admitted to medicine for symptomatic hypotension and RAZIA. Per chart, on 7/8 PM, pt was noted to have acute onset of melena x5 and coffee ground emesis x2-3. RRT was called on 7/9 AM for hypotension, hgb dropped from 9.2 to 7.4 (admission baseline 10-11), FOBT +, pt was started on PPI IV and transfused 1 unit of pRBC. GI was consulted and underwent EGD on 7/9 afternoon. MICU was consulted for uncontrolled bleeding during EGD. During EGD, pt became hypotensive and was given phenylephrine, had A-fib with RVR s/p amiodarone. Now s/p IR GDA embolization, admitted to MICU for further moniring i.s.o hemorrhagic shock 2/2 GI bleed. On repeat EGD, oozing but no active bleeding was noted. Hospital course c/b c.diff, and patient has been placed on vancomycin. Patient had melanotic stool 7/18 with Hgb drop 10.1 to 7.1 and received 2 units pRBC and 1 unit platelets. Patient was reintubated and underwent EGD 7/18 afternoon. GI found duodenal bleed. Patient now s/p 3x clips and epi.    Plan:     NEUROLOGIC  # Baseline AOx3  Course:  - intubated 7/9  - weaned off propofol onto precedex  - extubated 7/15  - reintubated 7/18 for EGD  Plan:  - Patient currently on precedex  - fentanyl prn for pain control (last dose 7/12/24)    CARDIOVASCULAR  # hemorrhagic shock   Course:  - RRT called 7/9 for hypotension   - 2/2 to Upper GI bleeds, urgently took to EGD, found bleeding ulcer that was not well controlled, underwent IR empiric embolization (7/9)  - s/p 5 pRBC prior to MICU, and 4 pRBC, 2 FFP, 2 platelets in MICU (7/17)  - Restarted plavix 7/13/24 for new stent (6/12/24) per GI  - on henny, maintain MAP > 65; took off vaso and precedex (7/13/24), he got tachy to 118. Added precedex back. vasopressin added 7/14  - per spouse, pt's baseline BP is a little bit over 100  - Albumin 5%  mL given 7/16  - Vasopressin stopped (7/17)  - On 7/18, patient had large melanotic stool with Hgb drop from 10.1-7.1, patient was restarted on henny, vaso, and levo for reintubation for GI scope  - s/p 2pRBC, 1 platelet (7/18)  - s/p 1 pRBC (7/20)  - total transfusions: 12pRBC, 3 platelet  Findings:  - AM cortisol 17 (7/5), AM cortisol 37.1 (7/11)  - POCUS ECHO (7/16) showed no cardiogenic shock, no tamponade, low SV indeterminate IVC, irregular B lines but not concerning for pulmonary edema, and some subdiaphragmatic fluid  Plan:  - Continue to monitor CBC  - Transfuse for Hgb <8  - Continue henny, levo, and vaso, try to wean (goal MAP >65)  - Continue to hold Plavix 75 mg  - IVF if not volume overloaded given recent stool patterns  - Continue midodrine 30mg q8  - D/c droxidopa 200mg q8  - Start hydrocortisone 50mg q 6    #CAD s/p PCI x3, most recent stent 6/12/24, NURIA to pLAD  Course:  - restarted plavix 7/13/24 for new stent (6/12/24) per GI   Plan:  - Continue to hold Plavix 75 mg  - Continue atorvastatin 80 mg daily    #PAD  # A-fib on eliquis  May have been worsened because of GI bleed  - s/p successful DCCV 10/31   - In RVR 7/16  - Cards recommended resuming home dose sotalol 40 mg PO (qTC wnl)  - s/p 1 dose sotalol but RVR persisted  - s/p 2 doses of amio overnight (7/16)  - attempted to restart home sotalol, but patient HR returned to 150s in afternoon (7/16)  - Amio restarted amio 150 mg followed by loading dose of 1 mg/min for 6 hours and 0.5 mg/min for 18 hours (7/16)  - received amio 150 overnight (7/17)  Plan:  - Hold eliquis   - Continue amio 400 mg q8 for 12 doses followed by 200 mg daily (first 400mg dose 1300 on 7/17)  - Hold home sotalol  - Cardiology following    PULMONARY  #Intubated for airway protection prior to EGD/IR embolization  Course:  - Intubated 7/9  - Extubated 7/15  - Intubated again 7/18  Plan:  - Patient placed on CPAP/PSV  - Continue to monitor overall clinical status in considering extubation as patient was not intubated for pulmonary reasons    #Sputum production (NA)  - Patient complaining of cough and phlegm  - Patient able to self-suction (7/17)  Plan:  - Currently intubated  - Airway clearance protocol    RENAL/  #RAZIA   #ATN i.s.o hypotension  #metabolic acidosis   Findings:  - pH 7.37 (7/16)  - Cr 1.53 (7/16)  - AG 18 (7/17) with high glucose, concern for DKA but ABG pH 7.39, pCO2 26 pO2 87, pHCO3 16, Beta hydroxybutyrate 0 (7/17)  - AG 13 (7/18)  - ABG pH 7.33, pCO2 30, pO2 136, HCO3 16 (7/21)  Plan:  - f/u nephro recs  - Increase sodium bicarb 1950 mg tid, per nephro  - Per nephro, patient is not a dialysis candidate  - trend BUN/Cr   - monitor Is and Os   - Bumex 2mg IV discontinued    #Urinary retention  Plan:  - Started Doxazosin 2 mg daily (7/15)  - Monitor I/Os  - Ivory placed today by urology, making urine    GASTROINTESTINAL  #Upper GI Bleed  Course:  - s/p 5 units pRBC prior to MICU and then 4:2:2 on 7/12  - CT A/P 7/9 - Active bleeding in the third portion of the duodenum. Progression of liver metastases.  - EGD 7/9 showed esophagitis, One non-bleeding duodenal ulcer with a clean ulcer base (Arjun Class III). One oozing duodenal ulcer with spurting hemorrhage (Arjun Class Ia). Treatment not successful. Treated with bipolar cautery.  - s/p IR embolization on 7/9  - Patient had melanotic stool overnight (7/18) and hemoglobin drop 10.1 to 7.1 in 24 hours  - Reached out to surgery in case of recurrent bleeding per GI if unable to find/contain source during re-scope  - s/p EGD 7/19 showing duodenal bleed s/p 3 clips + epi  - Total EGD: 3  - Bloody BM 7/19 likely old blood  - Patient continues to have melatonic stools with Hgb drop  - CTA (7/20): no active bleed  Plan:  - per GI recs, changed PPI gtt to PPI IV BID  - monitor Hgb, transfuse as needed for Hgb <8  - hold eliquis   - continue to f/u with GI and IR     - Per GI, "Will need PPI BID for 8 weeks for grade D esophagitis and PUD"    - Per GI, high risk of 30 day rebleed (>10-20% risk)  - IR consult post-CT angio  - Surgery Reconsulted and aware  - GI following, no intervention on their side today    #Nutrition  Course:  - placed OG tube per GI and started ensure clear liquid (see note from RD 7/13/24)  - OG tube removed during extubation (7/15/24)  -dysphagia screen failed  - FEES (7/17) failed  - ENT consulted for vallecular lesion, diagnosed vallecular cyst, no inpatient intervention required, outpatient f/u  Plan:  - OG tube in place  - Diet per RD recs (placed 7/17)  - Speech and swallow to reevaluate overall status improves  - Per GI, resume tube feeds today (7/21) if hemoglobin stable    #Diarrhea  Course:  - stopped maintenance fluids   - positive for c. diff (7/14)  - started vancomycin (7/14 -)   - having worsening diarrhea, no melena (7/16)  Plan:  - Continue vancomycin  - Fluids as necessary to compensate for volume loss 2/2 diarrhea    #Transaminitis (resolving)  Findings:  - Bili 1.1 (7/16) -> 1.3 (7/17)  - Alk P 359 (7/16)-> 484 (7/17)  -  (7/16)->129 (7/17)  - ALT 67 (7/16)-> 72 (7/17)  - RUQ US (7/17): known liver mets, no acute findings  Plan:  - Restart atorvastatin 80 mg daily    INFECTIOUS DISEASE  #leukocytosis  Findings/Course:  -  BCx 7/10 - NGTD  - Sputum Cx from ETT 7/13 showed numerous gram neg krishna (Klebsiella oxytoca/raoultella ornithinolytica)  - Started zosyn for empiric treatment (7/11 - 7/18)   - GI panel: previously indeterminate norovirus (7/8), negative (7/13)     - per ID- no intervention needed regarding the PCR results  - c. diff + (7/14)  - started vancomycin for c. diff (7/14- )  - febrile 7/16 overnight  - currently afebrile 7/17  - MRSA Swab: negative  - Staph aureus PCR positive  - Culture resistant to Zosyn (7/18)  - Switched Zosyn to Meropenem (7/18- )  - Patient febrile overnight (7/19)  Plan:  - Continue vancomycin 125 mg q6 for c. diff  - Switched to meropenem 1000 mg q8  - Re-sent cultures  - If persistent fevers, consider escalating vancomycin to 250mg q6 or adding flagyl  - Continue to monitor for fevers    ENDOCRINE  #adrenal insufficiency   Findings:  -7/5 cortisol 17   - 7/11 cortisol 37.1   Plan:  - Start hydrocortisone 50mg q6  - Monitor sugars with steroid    HEMATOLOGY/ONCOLOGY   # neuroendocrine tumor   - was on lanreotide then had POD in liver and started on peptide receptor radiotherapy (PRRT)- typically given every 8 weeks for 4 doses. He last received it 10/20/2023   - PET 5/28/24 shows new somatostatin avid osseous lesions; decreased intensely avid right supradiaphragmatic and upper abdominal nodes, suspicious for mets  Plan:  - per heme/onc:    - can resume octreotide 150 mcg bid once infection r/o      - can check factor levels V, VIII, X     - "-- f/u with Dr. Sophia Prasad at [facility] [place] after discharge, no plan for treatment inpatient, if clinically improves/stabilizes then can be considered for treatment outpatient"    #Normocytic anemia   - Pt HgB dropped from 9.0 -> 8.1  - ISO of GI bleed hx and continued melanotic BMs, there is c/o of rebleed   Plan:  - CT angio abdomen pelvis ordered, f/u results   - Surgery and GI made aware & are following     #DVT ppx  - SCDs  - LE duplex (7/17): negative for DVT    SKINS:   - Lines/Tubes - PIV, cordis replaced with central line (7/14), A line (7/20)    Ethics:   - Full Code   - GOC 7/12, spoke to spouse (Yamilet) over phone with palliative care team, discussed GOC again on 7/16    Consults this admission: GI, Heme Onc, Palliative, Endocrinology, Cardiology, Nephrology   Assessment	  Assessment: 77 yo male with metastatic neuroendocrine pancreatic cancer (tx on hold) and PMH of CAD s/p PCI x3 with most recent stent 6/12/24 on Plavix and eliquis , chronic Afib on eliquis, orthostatic hypotension on midodrine, PAD , recent admission for dx cath on 6/24/24 presented from PCP's office for hypotension despite taking AM midodrine dose on 7/2, admitted to medicine for symptomatic hypotension and RAZIA. Per chart, on 7/8 PM, pt was noted to have acute onset of melena x5 and coffee ground emesis x2-3. RRT was called on 7/9 AM for hypotension, hgb dropped from 9.2 to 7.4 (admission baseline 10-11), FOBT +, pt was started on PPI IV and transfused 1 unit of pRBC. GI was consulted and underwent EGD on 7/9 afternoon. MICU was consulted for uncontrolled bleeding during EGD. During EGD, pt became hypotensive and was given phenylephrine, had A-fib with RVR s/p amiodarone. Now s/p IR GDA embolization, admitted to MICU for further moniring i.s.o hemorrhagic shock 2/2 GI bleed. On repeat EGD, oozing but no active bleeding was noted. Hospital course c/b c.diff, and patient has been placed on vancomycin. Patient had melanotic stool 7/18 with Hgb drop 10.1 to 7.1 and received 2 units pRBC and 1 unit platelets. Patient was reintubated and underwent EGD 7/18 afternoon. GI found duodenal bleed. Patient now s/p 3x clips and epi.    Plan:     NEUROLOGIC  # Baseline AOx3  Course:  - intubated 7/9  - weaned off propofol onto precedex  - extubated 7/15  - reintubated 7/18 for EGD  Plan:  - Patient currently on precedex  - fentanyl prn for pain control (last dose 7/12/24)    CARDIOVASCULAR  # hemorrhagic shock   Course:  - RRT called 7/9 for hypotension   - 2/2 to Upper GI bleeds, urgently took to EGD, found bleeding ulcer that was not well controlled, underwent IR empiric embolization (7/9)  - s/p 5 pRBC prior to MICU, and 4 pRBC, 2 FFP, 2 platelets in MICU (7/17)  - Restarted plavix 7/13/24 for new stent (6/12/24) per GI  - on henny, maintain MAP > 65; took off vaso and precedex (7/13/24), he got tachy to 118. Added precedex back. vasopressin added 7/14  - per spouse, pt's baseline BP is a little bit over 100  - Albumin 5%  mL given 7/16  - Vasopressin stopped (7/17)  - On 7/18, patient had large melanotic stool with Hgb drop from 10.1-7.1, patient was restarted on henny, vaso, and levo for reintubation for GI scope  - s/p 2pRBC, 1 platelet (7/18)  - s/p 1 pRBC (7/20)  - total transfusions: 12pRBC, 3 platelet  Findings:  - AM cortisol 17 (7/5), AM cortisol 37.1 (7/11)  - POCUS ECHO (7/16) showed no cardiogenic shock, no tamponade, low SV indeterminate IVC, irregular B lines but not concerning for pulmonary edema, and some subdiaphragmatic fluid  Plan:  - Continue to monitor CBC  - Transfuse for Hgb <8  - Continue henny, levo, and vaso, try to wean (goal MAP >65)  - Continue to hold Plavix 75 mg  - IVF if not volume overloaded given recent stool patterns  - Continue midodrine 30mg q8  - D/c droxidopa 200mg q8  - Start hydrocortisone 50mg q 6    #CAD s/p PCI x3, most recent stent 6/12/24, NURIA to pLAD  Course:  - restarted plavix 7/13/24 for new stent (6/12/24) per GI   Plan:  - Continue to hold Plavix 75 mg  - Continue atorvastatin 80 mg daily    #PAD  # A-fib on eliquis  May have been worsened because of GI bleed  - s/p successful DCCV 10/31   - In RVR 7/16  - Cards recommended resuming home dose sotalol 40 mg PO (qTC wnl)  - s/p 1 dose sotalol but RVR persisted  - s/p 2 doses of amio overnight (7/16)  - attempted to restart home sotalol, but patient HR returned to 150s in afternoon (7/16)  - Amio restarted amio 150 mg followed by loading dose of 1 mg/min for 6 hours and 0.5 mg/min for 18 hours (7/16)  - received amio 150 overnight (7/17)  Plan:  - Hold eliquis   - Continue amio 400 mg q8 for 12 doses followed by 200 mg daily (first 400mg dose 1300 on 7/17)  - Hold home sotalol  - Cardiology following    PULMONARY  #Intubated for airway protection prior to EGD/IR embolization  Course:  - Intubated 7/9  - Extubated 7/15  - Intubated again 7/18  Plan:  - Patient placed on CPAP/PSV  - Continue to monitor overall clinical status in considering extubation as patient was not intubated for pulmonary reasons    #Sputum production (NA)  - Patient complaining of cough and phlegm  - Patient able to self-suction (7/17)  Plan:  - Currently intubated  - Airway clearance protocol    RENAL/  #RAZIA   #ATN i.s.o hypotension  #metabolic acidosis   Findings:  - pH 7.37 (7/16)  - Cr 1.53 (7/16)  - AG 18 (7/17) with high glucose, concern for DKA but ABG pH 7.39, pCO2 26 pO2 87, pHCO3 16, Beta hydroxybutyrate 0 (7/17)  - AG 13 (7/18)  - ABG pH 7.33, pCO2 30, pO2 136, HCO3 16 (7/21)  Plan:  - f/u nephro recs  - Increase sodium bicarb 1950 mg tid, per nephro  - Per nephro, patient is not a dialysis candidate  - trend BUN/Cr   - monitor Is and Os   - Bumex 2mg IV discontinued    #Urinary retention  Plan:  - Started Doxazosin 2 mg daily (7/15)  - Monitor I/Os  - Ivory placed today by urology, making urine    GASTROINTESTINAL  #Upper GI Bleed  Course:  - s/p 5 units pRBC prior to MICU and then 4:2:2 on 7/12  - CT A/P 7/9 - Active bleeding in the third portion of the duodenum. Progression of liver metastases.  - EGD 7/9 showed esophagitis, One non-bleeding duodenal ulcer with a clean ulcer base (Arjun Class III). One oozing duodenal ulcer with spurting hemorrhage (Arjun Class Ia). Treatment not successful. Treated with bipolar cautery.  - s/p IR embolization on 7/9  - Patient had melanotic stool overnight (7/18) and hemoglobin drop 10.1 to 7.1 in 24 hours  - Reached out to surgery in case of recurrent bleeding per GI if unable to find/contain source during re-scope  - s/p EGD 7/19 showing duodenal bleed s/p 3 clips + epi  - Total EGD: 3  - Bloody BM 7/19 likely old blood  - Patient continues to have melatonic stools with Hgb drop  - CTA (7/20): IMPRESSION: "No evidence of GI bleed. Interval endoscopic versus surgical intervention with metallic streak artifact suggestive of surgical clips in the region of the proximal one third portions of the duodenum. Evaluation of the previously seen hematoma is limited secondary to this artifact. Moderate bilateral pleural effusions and associated compressive atelectasis, right greater than left, overall slightly increased from previous CT. Anasarca."  Plan:  - per GI recs, changed PPI gtt to PPI IV BID  - monitor Hgb, transfuse as needed for Hgb <8  - hold eliquis   - continue to f/u with GI and IR     - Per GI, "Will need PPI BID for 8 weeks for grade D esophagitis and PUD"    - Per GI, high risk of 30 day rebleed (>10-20% risk)  - IR consult post-CT angio  - No acute surgical intervention per surgery  - No acute GI intervention per GI  - Gastrin level per surgery    #Nutrition  Course:  - placed OG tube per GI and started ensure clear liquid (see note from RD 7/13/24)  - OG tube removed during extubation (7/15/24)  -dysphagia screen failed  - FEES (7/17) failed  - ENT consulted for vallecular lesion, diagnosed vallecular cyst, no inpatient intervention required, outpatient f/u  Plan:  - OG tube in place  - Diet per RD recs (placed 7/17)  - Speech and swallow to reevaluate overall status improves  - Per GI, resume tube feeds today (7/21) if hemoglobin stable    #Diarrhea  Course:  - stopped maintenance fluids   - positive for c. diff (7/14)  - started vancomycin (7/14 -)   - having worsening diarrhea, no melena (7/16)  Plan:  - Continue vancomycin  - Fluids as necessary to compensate for volume loss 2/2 diarrhea    #Transaminitis (resolving)  Findings:  - Bili 1.1 (7/16) -> 1.3 (7/17)  - Alk P 359 (7/16)-> 484 (7/17)  -  (7/16)->129 (7/17)  - ALT 67 (7/16)-> 72 (7/17)  - RUQ US (7/17): known liver mets, no acute findings  Plan:  - Restart atorvastatin 80 mg daily    INFECTIOUS DISEASE  #leukocytosis  Findings/Course:  -  BCx 7/10 - NGTD  - Sputum Cx from ETT 7/13 showed numerous gram neg krishna (Klebsiella oxytoca/raoultella ornithinolytica)  - Started zosyn for empiric treatment (7/11 - 7/18)   - GI panel: previously indeterminate norovirus (7/8), negative (7/13)     - per ID- no intervention needed regarding the PCR results  - c. diff + (7/14)  - started vancomycin for c. diff (7/14- )  - febrile 7/16 overnight  - currently afebrile 7/17  - MRSA Swab: negative  - Staph aureus PCR positive  - Culture resistant to Zosyn (7/18)  - Switched Zosyn to Meropenem (7/18- )  - Patient febrile overnight (7/19)  Plan:  - Continue vancomycin 125 mg q6 for c. diff  - Switched to meropenem 1000 mg q8  - Re-sent cultures  - If persistent fevers, consider escalating vancomycin to 250mg q6 or adding flagyl  - Continue to monitor for fevers    ENDOCRINE  #adrenal insufficiency   Findings:  -7/5 cortisol 17   - 7/11 cortisol 37.1   Plan:  - Start hydrocortisone 50mg q6  - Monitor sugars with steroid    HEMATOLOGY/ONCOLOGY   # neuroendocrine tumor   - was on lanreotide then had POD in liver and started on peptide receptor radiotherapy (PRRT)- typically given every 8 weeks for 4 doses. He last received it 10/20/2023   - PET 5/28/24 shows new somatostatin avid osseous lesions; decreased intensely avid right supradiaphragmatic and upper abdominal nodes, suspicious for mets  Plan:  - per heme/onc:    - can resume octreotide 150 mcg bid once infection r/o      - can check factor levels V, VIII, X     - "-- f/u with Dr. Sophia Prasad at [facility] [place] after discharge, no plan for treatment inpatient, if clinically improves/stabilizes then can be considered for treatment outpatient"    #Normocytic anemia   - Pt HgB dropped from 9.0 -> 8.1  - ISO of GI bleed hx and continued melanotic BMs, there is c/o of rebleed   Plan:  - Surgery and GI made aware & are following     #DVT ppx  - SCDs  - LE duplex (7/17): negative for DVT    SKINS:   - Lines/Tubes - PIV, cordis replaced with central line (7/14), A line (7/20)    Ethics:   - Full Code   - GOC 7/12, spoke to spouse (Yamilet) over phone with palliative care team, discussed GOC again on 7/16  - Reach back out to palliative given hospital course    Consults this admission: GI, Heme Onc, Palliative, Endocrinology, Cardiology, Nephrology   Assessment	  Assessment: 79 yo male with metastatic neuroendocrine pancreatic cancer (tx on hold) and PMH of CAD s/p PCI x3 with most recent stent 6/12/24 on Plavix and eliquis , chronic Afib on eliquis, orthostatic hypotension on midodrine, PAD , recent admission for dx cath on 6/24/24 presented from PCP's office for hypotension despite taking AM midodrine dose on 7/2, admitted to medicine for symptomatic hypotension and RAZIA. Per chart, on 7/8 PM, pt was noted to have acute onset of melena x5 and coffee ground emesis x2-3. RRT was called on 7/9 AM for hypotension, hgb dropped from 9.2 to 7.4 (admission baseline 10-11), FOBT +, pt was started on PPI IV and transfused 1 unit of pRBC. GI was consulted and underwent EGD on 7/9 afternoon. MICU was consulted for uncontrolled bleeding during EGD. During EGD, pt became hypotensive and was given phenylephrine, had A-fib with RVR s/p amiodarone. Now s/p IR GDA embolization, admitted to MICU for further moniring i.s.o hemorrhagic shock 2/2 GI bleed. On repeat EGD, oozing but no active bleeding was noted. Hospital course c/b c.diff, and patient has been placed on vancomycin. Patient had melanotic stool 7/18 with Hgb drop 10.1 to 7.1 and received 2 units pRBC and 1 unit platelets. Patient was reintubated and underwent EGD 7/18 afternoon. GI found duodenal bleed. Patient now s/p 3x clips and epi. Since 7/18 patient has continued to have some melanotic stools with hemoglobin drops concerning for slow rebleeding.     Plan:     NEUROLOGIC  # Baseline AOx3  Course:  - intubated 7/9  - weaned off propofol onto precedex  - extubated 7/15  - reintubated 7/18 for EGD  Plan:  - Patient currently on propofol  - fentanyl prn for pain control (last dose 7/12/24)    CARDIOVASCULAR  # hemorrhagic shock   Course:  - Per spouse, pt's baseline BP is a little bit over 100  - 7/9: RRT called 7/9 for hypotension; 2/2 to Upper GI bleeds, urgently took to EGD, found bleeding ulcer that was not well controlled, underwent IR empiric embolization  - 7/13: Restarted plavix for new stent (6/12/24) per GI; on henny, maintain MAP > 65; took off vaso and precedex, he got tachy to 118. Added precedex back.   - 7/14 vasopressin added  - 7/16 Albumin 5%  mL given  - 7/17 Vasopressin stopped  - s/p 5 pRBC prior to MICU, and 4 pRBC, 2 FFP, 2 platelets in MICU (7/17)  - 7/18, patient had large melanotic stool with Hgb drop from 10.1-7.1, patient was restarted on henny, vaso, and levo for reintubation for GI scope; s/p 2pRBC, 1 platelet  - D/c droxidopa 200mg q8 given pressor requirements  - 7/20 1 pRBC  - Total transfusions: 12pRBC, 3 platelet  Findings:  - AM cortisol 17 (7/5), AM cortisol 37.1 (7/11)  - POCUS ECHO (7/16) showed no cardiogenic shock, no tamponade, low SV indeterminate IVC, irregular B lines but not concerning for pulmonary edema, and some subdiaphragmatic fluid  Plan:  - Continue to monitor CBC  - Transfuse for Hgb <8  - Continue henny, levo, and vaso, try to wean (goal MAP >65)  - Continue to hold Plavix 75 mg  - IVF if not volume overloaded given recent stool patterns  - Continue midodrine 30mg q8  - Start hydrocortisone 50mg q6 for refractory shock    #CAD s/p PCI x3, most recent stent 6/12/24, NURIA to pLAD  Course:  - restarted plavix 7/13/24 for new stent (6/12/24) per GI   Plan:  - Continue to hold Plavix 75 mg  - Continue atorvastatin 80 mg daily    #PAD  # A-fib on eliquis  May have been worsened because of GI bleed  - s/p successful DCCV 10/31   - 7/16 In RVR, 2 doses of amio overnight; Cards recommended resuming home dose sotalol 40 mg PO (qTC wnl), s/p 1 dose sotalol but RVR persisted  - 7/16 Amio restarted amio 150 mg followed by loading dose of 1 mg/min for 6 hours and 0.5 mg/min for 18 hours  - 7/17 received amio 150 overnight  Plan:  - Hold eliquis   - Continue amio 400 mg q8 for 12 doses followed by 200 mg daily (first 400mg dose 1300 on 7/17)  - Hold home sotalol  - Cardiology following    PULMONARY  #Intubated for airway protection prior to EGD/IR embolization  Course:  - Intubated 7/9  - Extubated 7/15  - Intubated again 7/18  Plan:  - Continue to monitor overall clinical status in considering extubation as patient was not intubated for pulmonary reasons    #Sputum production (NA)  - Patient complaining of cough and phlegm  - Patient able to self-suction (7/17)  Plan:  - Currently intubated  - Airway clearance protocol    RENAL/  #RAZIA   #ATN i.s.o hypotension  #metabolic acidosis   Findings:  - pH 7.37 (7/16)  - Cr 1.53 (7/16)  - AG 18 (7/17) with high glucose, concern for DKA but ABG pH 7.39, pCO2 26 pO2 87, pHCO3 16, Beta hydroxybutyrate 0 (7/17)  - AG 13 (7/18)  - ABG pH 7.33, pCO2 30, pO2 136, HCO3 16 (7/21)  Plan:  - f/u nephro recs  - Increase sodium bicarb 1950 mg tid, per nephro  - Per nephro, patient is not a dialysis candidate  - trend BUN/Cr   - monitor Is and Os   - Bumex 2mg IV discontinued    #Urinary retention  Plan:  - Started Doxazosin 2 mg daily (7/15)  - Monitor I/Os  - Ivory placed by urology 7/19, making urine    GASTROINTESTINAL  #Upper GI Bleed  Course:  - 7/9 CT A/P - Active bleeding in the third portion of the duodenum. Progression of liver metastases.  - 7/9 EGD showed esophagitis, One non-bleeding duodenal ulcer with a clean ulcer base (Arjun Class III). One oozing duodenal ulcer with spurting hemorrhage (Arjun Class Ia). Treatment not successful. Treated with bipolar cautery.  - 7/9 s/p IR GDA embolization  - 7/12 s/p 5 units pRBC prior to MICU and then 4:2:2  - 7/18 Patient had melanotic stool overnight and hemoglobin drop 10.1 to 7.1 in 24 hours; Reached out to surgery in case of recurrent bleeding per GI if unable to find/contain source during re-scope  - 7/18 EGD showing duodenal bleed s/p 3 clips + epi  - Total EGD: 3  - 7/19 Bloody BM likely old blood  - Patient continues to have melatonic stools with Hgb drop  - 7/20 CTA IMPRESSION: "No evidence of GI bleed. Interval endoscopic versus surgical intervention with metallic streak artifact suggestive of surgical clips in the region of the proximal one third portions of the duodenum. Evaluation of the previously seen hematoma is limited secondary to this artifact. Moderate bilateral pleural effusions and associated compressive atelectasis, right greater than left, overall slightly increased from previous CT. Anasarca."  Plan:  - per GI recs, changed PPI gtt to PPI IV BID  - monitor Hgb, transfuse as needed for Hgb <8  - hold eliquis   - continue to f/u with GI and IR     - Per GI, "Will need PPI BID for 8 weeks for grade D esophagitis and PUD"    - Per GI, high risk of 30 day rebleed (>10-20% risk)  - No acute IR intervention per IR because no active area of extravasation on imaging  - No acute surgical intervention per surgery  - No acute GI intervention per GI  - Gastrin level per surgery    #Nutrition  Course:  - placed OG tube per GI and started ensure clear liquid (see note from RD 7/13/24)  - OG tube removed during extubation (7/15/24)  -dysphagia screen failed  - FEES (7/17) failed  - ENT consulted for vallecular lesion, diagnosed vallecular cyst, no inpatient intervention required, outpatient f/u  Plan:  - OG tube in place  - Diet per RD recs (placed 7/17)  - Speech and swallow to reevaluate overall status improves    #Diarrhea  Course:  - stopped maintenance fluids   - positive for c. diff (7/14)  - started vancomycin (7/14 -)   - having worsening diarrhea, no melena (7/16)  Plan:  - Continue vancomycin  - Fluids as necessary to compensate for volume loss 2/2 diarrhea    #Transaminitis (resolving)  Findings:  - Bili 1.1 (7/16) -> 1.3 (7/17)  - Alk P 359 (7/16)-> 484 (7/17)  -  (7/16)->129 (7/17)  - ALT 67 (7/16)-> 72 (7/17)  - RUQ US (7/17): known liver mets, no acute findings  Plan:  - Restart atorvastatin 80 mg daily    INFECTIOUS DISEASE  #leukocytosis  Findings/Course:  - 7/10 BCx - NGTD  - Sputum Cx from ETT 7/13 showed numerous gram neg krishna (Klebsiella oxytoca/raoultella ornithinolytica)  - Started zosyn for empiric treatment (7/11 - 7/18)   - GI panel: previously indeterminate norovirus (7/8), negative (7/13)     - per ID- no intervention needed regarding the PCR results  - c. diff + (7/14)  - started vancomycin for c. diff (7/14- )  - febrile 7/16 overnight  - currently afebrile 7/17  - MRSA Swab: negative  - Staph aureus PCR positive  - Culture resistant to Zosyn (7/18)  - Switched Zosyn to Meropenem (7/18- )  - Patient febrile overnight (7/19)  Plan:  - Continue vancomycin 125 mg q6 for c. diff  - Switched to meropenem 1000 mg q8  - Re-sent cultures  - If persistent fevers, consider escalating vancomycin to 250mg q6 or adding flagyl  - Continue to monitor for fevers    ENDOCRINE  #adrenal insufficiency   Findings:  -7/5 cortisol 17   - 7/11 cortisol 37.1   Plan:  - Start hydrocortisone 50mg q6  - Monitor sugars with steroid    HEMATOLOGY/ONCOLOGY   # neuroendocrine tumor   - was on lanreotide then had POD in liver and started on peptide receptor radiotherapy (PRRT)- typically given every 8 weeks for 4 doses. He last received it 10/20/2023   - PET 5/28/24 shows new somatostatin avid osseous lesions; decreased intensely avid right supradiaphragmatic and upper abdominal nodes, suspicious for mets  Plan:  - per heme/onc:    - can resume octreotide 150 mcg bid once infection r/o      - can check factor levels V, VIII, X     - "-- f/u with Dr. Sophia Prasad at [facility] [place] after discharge, no plan for treatment inpatient, if clinically improves/stabilizes then can be considered for treatment outpatient"    #Normocytic anemia   - Pt HgB dropped from 9.0 -> 8.1  - ISO of GI bleed hx and continued melanotic BMs, there is c/o of rebleed   Plan:  - Surgery and GI made aware & are following     #DVT ppx  - SCDs  - LE duplex (7/17): negative for DVT    SKINS:   - Lines/Tubes - PIV, cordis replaced with central line (7/14), A line (7/20)    Ethics:   - Full Code   - GOC 7/12, spoke to spouse (Yamilet) over phone with palliative care team, discussed GOC again on 7/16  - Reach back out to palliative tomorrow given hospital course    Consults this admission: GI, Heme Onc, Palliative, Endocrinology, Cardiology, Nephrology

## 2024-07-21 NOTE — CONSULT NOTE ADULT - ASSESSMENT
79 yo male with history of CAD s/p PCI x3 (most recently 6/12/24 on Plavix, recent cath 6/24/24), afib on Eliquis, orthostatic hypotension on midodrine, PAD, and metastatic neuroendocrine tumor admitted for hypotension and RAZIA. Course complicated by recurrent GIBs requiring multiple transfusions as well as GDA embolization 7/9 and hemostatic agents/clips on EGD 7/12 and 7/18. Patient currently normotensive on 4 henny 0.08 levo 0.04 vaso though in afib with RVR. H&H stable since last transfusion 7/20.    Recommendations:  - No acute surgical intervention  - No obvious active extrav on CTA though significant artifact 2/2 clips, f/u read  - GI recs appreciated, continue PPI  - Continue to hold AC/AP  - Transfuse prn  - Global care per MICU    ****Plan tentative until discussed with attending****    Trauma/ACS  [id number] 79 yo male with history of CAD s/p PCI x3 (most recently 6/12/24 on Plavix, recent cath 6/24/24), afib on Eliquis, orthostatic hypotension on midodrine, PAD, multiple spine surgeries including ALIF, and metastatic neuroendocrine tumor admitted for hypotension and RAZIA. Course complicated by recurrent GIBs requiring multiple transfusions as well as GDA embolization 7/9 and hemostatic agents/clips on EGD 7/12 and 7/18. Patient currently normotensive on 4 henny 0.08 levo 0.04 vaso though in afib with RVR. H&H stable since last transfusion 7/20.    Recommendations:  - No acute surgical intervention  - No obvious active extrav on CTA though significant artifact 2/2 clips, f/u read  - GI recs appreciated, continue PPI  - Continue to hold AC/AP  - Transfuse prn  - Please check gastrin level  - Global care per MICU    ****Plan tentative until discussed with attending****    Trauma/ACS  [id number] 79 yo male with history of CAD s/p PCI x3 (most recently 6/12/24 on Plavix, recent cath 6/24/24), afib on Eliquis, orthostatic hypotension on midodrine, PAD, multiple spine surgeries including ALIF, and metastatic neuroendocrine tumor admitted for hypotension and RAZIA. Course complicated by recurrent GIBs requiring multiple transfusions as well as GDA embolization 7/9 and hemostatic agents/clips on EGD 7/12 and 7/18. Patient currently normotensive on 4 henny 0.08 levo 0.04 vaso though in afib with RVR. H&H stable since last transfusion 7/20.    Recommendations:  - No acute surgical intervention  - No obvious active extrav on CTA though significant artifact 2/2 clips, f/u read  - GI recs appreciated, continue PPI  - Continue to hold AC/AP  - Transfuse prn  - Please check gastrin level  - Global care per MICU    Discussed with Dr. Ko    Trauma/ACS  [id number]

## 2024-07-21 NOTE — PROGRESS NOTE ADULT - SUBJECTIVE AND OBJECTIVE BOX
[facility]   [phone]  RENAL FOLLOW UP NOTE  --------------------------------------------------------------------------------  Chief Complaint:    24 hour events/subjective:    seen earlier   intubated     PAST HISTORY  --------------------------------------------------------------------------------  No significant changes to PMH, PSH, FHx, SHx, unless otherwise noted    ALLERGIES & MEDICATIONS  --------------------------------------------------------------------------------  Allergies    No Known Allergies    Intolerances      Standing Inpatient Medications  aMIOdarone    Tablet 200 milliGRAM(s) Oral daily  aMIOdarone    Tablet   Oral   atorvastatin 80 milliGRAM(s) Oral at bedtime  chlorhexidine 0.12% Liquid 15 milliLiter(s) Oral Mucosa every 12 hours  chlorhexidine 2% Cloths 1 Application(s) Topical <User Schedule>  dexMEDEtomidine Infusion 0.1 MICROgram(s)/kG/Hr IV Continuous <Continuous>  doxazosin 2 milliGRAM(s) Oral at bedtime  folic acid Injectable 1 milliGRAM(s) IV Push daily  hydrocortisone sodium succinate Injectable 50 milliGRAM(s) IV Push every 6 hours  insulin lispro (ADMELOG) corrective regimen sliding scale   SubCutaneous every 6 hours  meropenem  IVPB 1000 milliGRAM(s) IV Intermittent every 8 hours  meropenem  IVPB      midodrine 30 milliGRAM(s) Oral every 8 hours  mupirocin 2% Nasal 1 Application(s) Both Nostrils two times a day  norepinephrine Infusion 0.05 MICROgram(s)/kG/Min IV Continuous <Continuous>  pantoprazole Infusion 8 mG/Hr IV Continuous <Continuous>  phenylephrine    Infusion 0.1 MICROgram(s)/kG/Min IV Continuous <Continuous>  propofol Infusion 20 MICROgram(s)/kG/Min IV Continuous <Continuous>  sodium bicarbonate 1950 milliGRAM(s) Oral three times a day  thiamine Injectable 100 milliGRAM(s) IV Push daily  vancomycin    Solution 125 milliGRAM(s) Oral every 6 hours  vasopressin Infusion 0.04 Unit(s)/Min IV Continuous <Continuous>    PRN Inpatient Medications      REVIEW OF SYSTEMS  --------------------------------------------------------------------------------      VITALS/PHYSICAL EXAM  --------------------------------------------------------------------------------  T(C): 36.2 (07-21-24 @ 11:45), Max: 37.2 (07-21-24 @ 00:00)  HR: 130 (07-21-24 @ 16:45) (120 - 145)  BP: --  RR: 22 (07-21-24 @ 16:45) (18 - 36)  SpO2: 100% (07-21-24 @ 16:45) (88% - 100%)  Wt(kg): --    Weight (kg): 125.6 (07-21-24 @ 00:00)      07-20-24 @ 07:01  -  07-21-24 @ 07:00  --------------------------------------------------------  IN: 3235.9 mL / OUT: 1132 mL / NET: 2103.9 mL    07-21-24 @ 07:01  -  07-21-24 @ 18:06  --------------------------------------------------------  IN: 1176.7 mL / OUT: 446 mL / NET: 730.7 mL      Physical Exam:  	  Gen:  intubated   	Pulm: decrease bs  no rales  +  ronchi  -  wheezing  	CV: No JVD. RRR, S1S2; no rub  	Abd: +BS,  + ascites and distended  	: No bladder distention   	UE: Warm, no cyanosis  no clubbing,  no edema  	LE: Warm, no cyanosis  no clubbing,  4+  edema    LABS/STUDIES  --------------------------------------------------------------------------------              7.8    12.89 >-----------<  111      [07-21-24 @ 15:27]              23.3     147  |  120  |  52  ----------------------------<  163      [07-21-24 @ 00:10]  3.6   |  14  |  1.40        Ca     7.8     [07-21-24 @ 00:10]      Mg     1.7     [07-21-24 @ 00:10]      Phos  4.2     [07-21-24 @ 00:10]    TPro  4.2  /  Alb  2.1  /  TBili  0.8  /  DBili  x   /  AST  53  /  ALT  38  /  AlkPhos  184  [07-21-24 @ 00:10]    PT/INR: PT 11.5 , INR 1.10       [07-21-24 @ 00:10]  PTT: 27.1       [07-21-24 @ 00:10]          [07-21-24 @ 00:10]    Creatinine Trend:  SCr 1.40 [07-21 @ 00:10]  SCr 1.41 [07-20 @ 12:57]  SCr 1.43 [07-20 @ 00:47]  SCr 1.40 [07-19 @ 06:31]  SCr 1.41 [07-19 @ 00:42]      TSH 2.47      [07-04-24 @ 12:31]

## 2024-07-21 NOTE — CONSULT NOTE ADULT - ATTENDING COMMENTS
seen and examined 07-21-24 @ 1330    sedated on propofol infusion  on PPI infusion  on phenylephrine @ 4 mcg/kg/min    intubated on mechanical vent (spont 10 / +5 / 30%)  soft / NT / ND  LLQ scar from ALIF    hgb - 8.1 -> 7.9 g/dL  Cr - 1.4  lactate - 1.0      recurrent UGI bleed from duodenal ulcers  -7/9, 7/12, 7/18 - multiple EGDs  -7/9 - empiric GDA embolization  -on 7/20, he got 1u RBC transfusion @ 1815  -if he has significant recurrent UGI bleed requiring multiple RBC transfusion, I would offer emergency duodenotomy to oversew ulcers.

## 2024-07-21 NOTE — PROGRESS NOTE ADULT - ASSESSMENT
79 yo male with PMH of CAD s/p PCI x3 with most recent stent 6/12/24 on Plavix, chronic Afib on eliquis, orthostatic hypotension on midodrine, PAD, neuroendocrine tumor with RT currently on hold, recent admission for dx cath on 6/24/24 who presented for hypotension, admitted for GIB and hemorrhagic shock. Found to be bleeding from duodenum. Transferred to MICU, on pressors.     1. Pancreatic NET- metastatic   -- was on lanreotide then had POD in liver and started on peptide receptor radiotherapy (PRRT)- typically given every 8 weeks for 4 doses. He received one dose on 10/20/2023 and planned to get his 2nd dose at the end of December however his course was complicated by multiple hospitalizations that were noncancer related (decompensated heart failure).   -- 5/28/24 PET Dotatate (compared to 9/2023): several new somatostatin avid osseous lesions, some faintly sclerotic, suspicious for mets. Increased upper RP nodes, probably metastatic. Decreased intensely tracer avid right supradiaphragmatic and upper abdominal nodes, suspicious for metastasis. Redemonstrated intensely somatostatin avid bilobar hepatic lesions, consistent with metastases again morphologically difficult to delineate.   -- CT reviewed by [facility]: SINCE MAY 8 2024 INCREASED HEPATIC METASTASES, NEWLY SEEN PRIMARY TUMOR IN THE PANCREAS, and active bleeding within the duodenum with associated intraluminal hematoma.   -- chromogranin level is elevated at 2058, but no prior level at [facility] for review   -- f/u with Dr. Sophia Prasad at [facility] after discharge, no plan for treatment inpatient, if clinically improves/stabilizes then can be considered for treatment outpatient    2. Duodenal bleed, Hemorrhagic shock  - Remains in MICU, intubated  - CT w/ bleed in duodenum. GI called, EGD 7/9 -> S/p  IR embolization 7/9, intubated in MICU -> s/p extubation 7/15 -> intubated 7/18  - s/p multiple transfusions, fibrinogen low would recommend Cry for < 100, but coags have improved as are essentially normal now so unlikely, probably was related to liver dysfunction.   - Once infection ruled out, may start octreotide 150 mcg BID  - s/p repeat EGD 7/12 showing duodenal lesions w/clots and oozing, no clear targets for intervention and no active bleeding, purastat applied to all areas of oozing.   - S/p EGD 7/18: duodenal ulcer with active oozing, ulcer with visible vessel. Bleeding treated.   - S/p one unit pRBC 7/20  - Per GI, high risk for rebleed, will continue PPI. F/u repeat CT AP, surgery and IR eval    3. C. diff diarrhea  - on oral paulino Topete PA-C  Hematology/Oncology  [facility]  [phone] (office)

## 2024-07-21 NOTE — PROGRESS NOTE ADULT - SUBJECTIVE AND OBJECTIVE BOX
DATE OF SERVICE: 07-21-24 @ 18:35    Patient is a 78y old  Male who presents with a chief complaint of hypotension (21 Jul 2024 18:06)      INTERVAL HISTORY: intubated    REVIEW OF SYSTEMS:  CONSTITUTIONAL: No weakness  EYES/ENT: No visual changes;  No throat pain   NECK: No pain or stiffness  RESPIRATORY: No cough, wheezing; No shortness of breath  CARDIOVASCULAR: No chest pain or palpitations  GASTROINTESTINAL: No abdominal  pain. No nausea, vomiting, or hematemesis  GENITOURINARY: No dysuria, frequency or hematuria  NEUROLOGICAL: No stroke like symptoms  SKIN: No rashes      MEDICATIONS:  aMIOdarone    Tablet 200 milliGRAM(s) Oral daily  aMIOdarone    Tablet   Oral   doxazosin 2 milliGRAM(s) Oral at bedtime  midodrine 30 milliGRAM(s) Oral every 8 hours  norepinephrine Infusion 0.05 MICROgram(s)/kG/Min IV Continuous <Continuous>  phenylephrine    Infusion 0.1 MICROgram(s)/kG/Min IV Continuous <Continuous>        PHYSICAL EXAM:  T(C): 36.2 (07-21-24 @ 11:45), Max: 37.2 (07-21-24 @ 00:00)  HR: 127 (07-21-24 @ 18:00) (120 - 145)  BP: --  RR: 24 (07-21-24 @ 18:00) (18 - 36)  SpO2: 100% (07-21-24 @ 18:00) (88% - 100%)  Wt(kg): --  I&O's Summary    20 Jul 2024 07:01  -  21 Jul 2024 07:00  --------------------------------------------------------  IN: 3235.9 mL / OUT: 1132 mL / NET: 2103.9 mL    21 Jul 2024 07:01  -  21 Jul 2024 18:35  --------------------------------------------------------  IN: 1364.7 mL / OUT: 540 mL / NET: 824.7 mL        Weight (kg): 125.6 (07-21 @ 00:00)    Appearance: In no distress	  HEENT:    PERRL, EOMI	  Cardiovascular:  S1 S2, No JVD  Respiratory: Lungs clear to auscultation	  Gastrointestinal:  Soft, Non-tender, + BS	  Vascularature:  No edema of LE  Psychiatric: Appropriate affect   Neuro: no acute focal deficits                               7.8    12.89 )-----------( 111      ( 21 Jul 2024 15:27 )             23.3     07-21    147<H>  |  120<H>  |  52<H>  ----------------------------<  163<H>  3.6   |  14<L>  |  1.40<H>    Ca    7.8<L>      21 Jul 2024 00:10  Phos  4.2     07-21  Mg     1.7     07-21    TPro  4.2<L>  /  Alb  2.1<L>  /  TBili  0.8  /  DBili  x   /  AST  53<H>  /  ALT  38  /  AlkPhos  184<H>  07-21        Labs personally reviewed      ASSESSMENT/PLAN: 	      78-year-old male multiple medical problems history of hepatocellular carcinoma status post radiation therapy, AF s/p DCCV on Eliquis who was found to be hypotensive following NST. Patient has reported general weakness, fatigue, lightheadedness since being discharged from hospital. Reports mild chest discomfort in midsternal region. Reports dyspnea with minimal exertion. Also reports significant lack of appetite and poor PO intake. No dark or bloody stool, nausea or vomiting.       Problem/Plan - 1:  ·  Problem: Hypotension  ·  Plan: Continue with pressors in MICU  - Patient presents with hypotension and also RAZIA. Has known orthostatic hypotension.   - Patient and wife report decreased PO intake likely 2/2 malignancy.  - GIB 7/9, s/p 4 units prbc, IR for mesenteric embolization, now in MICU on pressors  - c/w Midodrine and IV pressors (wean as tolerated as per MICU)     Problem/Plan - 2:  ·  Problem: CAD.   ·  Plan: Recent PCI to pLAD in June 2023  - ECG non-ischemic  - Plavix held given large melena; resume as soon as possible given recent PCI     Problem/Plan - 3:  ·  Problem: Atrial Fibrillation  - s/p succesful DCCV 10/31  - Hold Eliquis 5mg BID given GIB  -  Started on Amio 400mg TID per ICU  - However would advise against chemical cardioversion off AC if possible                Iolani Behrbom, AG-NP   Hank Hayes DO [facility]  Cardiovascular Medicine  [address] [place] [address], [address]  Available through call or text on [employer]  Office: [phone]

## 2024-07-21 NOTE — PROGRESS NOTE ADULT - SUBJECTIVE AND OBJECTIVE BOX
Gastroenterology/Hepatology Progress Note    Interval Events:   - patient with melena   - s/p 1pRBC on  and 1pRBC on      Allergies:  No Known Allergies      Hospital Medications:  aMIOdarone    Tablet 200 milliGRAM(s) Oral daily  aMIOdarone    Tablet   Oral   atorvastatin 80 milliGRAM(s) Oral at bedtime  chlorhexidine 0.12% Liquid 15 milliLiter(s) Oral Mucosa every 12 hours  chlorhexidine 2% Cloths 1 Application(s) Topical <User Schedule>  dexMEDEtomidine Infusion 0.1 MICROgram(s)/kG/Hr IV Continuous <Continuous>  doxazosin 2 milliGRAM(s) Oral at bedtime  folic acid Injectable 1 milliGRAM(s) IV Push daily  hydrocortisone sodium succinate Injectable 50 milliGRAM(s) IV Push every 6 hours  insulin lispro (ADMELOG) corrective regimen sliding scale   SubCutaneous every 6 hours  meropenem  IVPB      meropenem  IVPB 1000 milliGRAM(s) IV Intermittent every 8 hours  midodrine 30 milliGRAM(s) Oral every 8 hours  mupirocin 2% Nasal 1 Application(s) Both Nostrils two times a day  norepinephrine Infusion 0.05 MICROgram(s)/kG/Min IV Continuous <Continuous>  pantoprazole Infusion 8 mG/Hr IV Continuous <Continuous>  phenylephrine    Infusion 0.1 MICROgram(s)/kG/Min IV Continuous <Continuous>  propofol Infusion 20 MICROgram(s)/kG/Min IV Continuous <Continuous>  sodium bicarbonate 1950 milliGRAM(s) Oral three times a day  thiamine Injectable 100 milliGRAM(s) IV Push daily  vancomycin    Solution 125 milliGRAM(s) Oral every 6 hours  vasopressin Infusion 0.04 Unit(s)/Min IV Continuous <Continuous>      ROS: 14 point ROS unable to be assessed     PHYSICAL EXAM:   Vital Signs:  Vital Signs Last 24 Hrs  T(C): 36.2 (2024 11:45), Max: 37.2 (2024 00:00)  T(F): 97.2 (2024 11:45), Max: 99 (2024 00:00)  HR: 132 (2024 12:45) (120 - 145)  BP: --  BP(mean): --  RR: 22 (2024 12:45) (18 - 36)  SpO2: 100% (2024 12:45) (88% - 100%)    Parameters below as of 2024 08:00  Patient On (Oxygen Delivery Method): ventilator    O2 Concentration (%): 30  Daily     Daily Weight in k.6 (2024 00:00)    GENERAL: Intubated  HEENT:  NCAT, no scleral icterus  CHEST: on ventilator   HEART:  RRR  ABDOMEN:  Soft, non-tender, non-distended, no masses  EXTREMITIES:  No cyanosis, clubbing, or edema  SKIN:  No rash/erythema/ecchymoses/petechiae/wounds/abscess/warm/dry  NEURO:  Alert and oriented x 0    LABS:                        7.9    13.41 )-----------( 108      ( 2024 08:16 )             24.4     Mean Cell Volume: 92.4 fl (- @ 08:16)        147<H>  |  120<H>  |  52<H>  ----------------------------<  163<H>  3.6   |  14<L>  |  1.40<H>    Ca    7.8<L>      2024 00:10  Phos  4.2       Mg     1.7         TPro  4.2<L>  /  Alb  2.1<L>  /  TBili  0.8  /  DBili  x   /  AST  53<H>  /  ALT  38  /  AlkPhos  184<H>      LIVER FUNCTIONS - ( 2024 00:10 )  Alb: 2.1 g/dL / Pro: 4.2 g/dL / ALK PHOS: 184 U/L / ALT: 38 U/L / AST: 53 U/L / GGT: x           PT/INR - ( 2024 00:10 )   PT: 11.5 sec;   INR: 1.10 ratio         PTT - ( 2024 00:10 )  PTT:27.1 sec  Urinalysis Basic - ( 2024 00:10 )    Color: x / Appearance: x / SG: x / pH: x  Gluc: 163 mg/dL / Ketone: x  / Bili: x / Urobili: x   Blood: x / Protein: x / Nitrite: x   Leuk Esterase: x / RBC: x / WBC x   Sq Epi: x / Non Sq Epi: x / Bacteria: x            Imaging:

## 2024-07-21 NOTE — PROGRESS NOTE ADULT - SUBJECTIVE AND OBJECTIVE BOX
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 Patient states since discharge on this past Saturday, he continued to feel ongoing generalized weakness and dizziness with positional changes despite compliance with home midodrine 15mg TID and holding torsemide as instructed. Admits to poor PO intake of fluids/solute due to ongoing issues with poor appetite and nausea with meals which is not new. Denies any fever, chills, chest pain, SOB, cough, abd pain, dysuria nor urinary frequency. Has chronic diarrhea that is unchanged from his baseline.     In the ED, vitals notable for SBP 92-11s. Labs notable for elevated BUN/Cr 31/1.59 (from 30/1.18 on day of discharge 6/29/24). CXR with clear lungs. Abd US with innumerable intrahepatic echogenic masses consistent with known metastatic neuroendocrine tumor. Admitted to medicine for symptomatic hypotension and RAZIA. (02 Jul 2024 18:34)      SUBJECTIVE / OVERNIGHT EVENTS: ***      MEDICATIONS  (STANDING):  aMIOdarone    Tablet 400 milliGRAM(s) Oral every 8 hours  aMIOdarone    Tablet 200 milliGRAM(s) Oral daily  aMIOdarone    Tablet   Oral   atorvastatin 80 milliGRAM(s) Oral at bedtime  chlorhexidine 0.12% Liquid 15 milliLiter(s) Oral Mucosa every 12 hours  chlorhexidine 2% Cloths 1 Application(s) Topical <User Schedule>  dexMEDEtomidine Infusion 0.1 MICROgram(s)/kG/Hr (2.47 mL/Hr) IV Continuous <Continuous>  doxazosin 2 milliGRAM(s) Oral at bedtime  folic acid Injectable 1 milliGRAM(s) IV Push daily  insulin lispro (ADMELOG) corrective regimen sliding scale   SubCutaneous every 6 hours  meropenem  IVPB 1000 milliGRAM(s) IV Intermittent every 8 hours  meropenem  IVPB      midodrine 30 milliGRAM(s) Oral every 8 hours  mupirocin 2% Nasal 1 Application(s) Both Nostrils two times a day  norepinephrine Infusion 0.05 MICROgram(s)/kG/Min (9.25 mL/Hr) IV Continuous <Continuous>  pantoprazole Infusion 8 mG/Hr (10 mL/Hr) IV Continuous <Continuous>  phenylephrine    Infusion 0.1 MICROgram(s)/kG/Min (1.85 mL/Hr) IV Continuous <Continuous>  propofol Infusion 20 MICROgram(s)/kG/Min (11.8 mL/Hr) IV Continuous <Continuous>  sodium bicarbonate 1300 milliGRAM(s) Oral three times a day  thiamine Injectable 100 milliGRAM(s) IV Push daily  vancomycin    Solution 125 milliGRAM(s) Oral every 6 hours  vasopressin Infusion 0.04 Unit(s)/Min (6 mL/Hr) IV Continuous <Continuous>    MEDICATIONS  (PRN):    Allergies    No Known Allergies    Intolerances        ICU Vital Signs Last 24 Hrs  T(F): 98 (21 Jul 2024 04:00), Max: 99 (20 Jul 2024 08:00)  HR: 137 (21 Jul 2024 04:30) (100 - 137)  BP: --  BP(mean): --  ABP: 112/54 (21 Jul 2024 04:30) (78/42 - 129/67)  ABP(mean): 78 (21 Jul 2024 04:30) (54 - 94)  RR: 29 (21 Jul 2024 04:30) (18 - 32)  SpO2: 100% (21 Jul 2024 04:30) (97% - 100%)    Mode: AC/ CMV (Assist Control/ Continuous Mandatory Ventilation)  RR (machine): 16  TV (machine): 450  FiO2: 30  PEEP: 5  ITime: 1  MAP: 11  PIP: 23    Physical Exam: ***  GENERAL: NAD, lying in bed comfortably  HEAD:  Atraumatic, Normocephalic  EYES: EOMI, PERRLA, conjunctiva and sclera clear  ENT: Moist mucous membranes  NECK: Supple, No JVD  CHEST/LUNG: Clear to auscultation bilaterally; No rales, rhonchi, wheezing, or rubs. Unlabored respirations  HEART: Regular rate and rhythm; Normal S1/S2. No murmurs, rubs, or gallops  ABDOMEN: Bowel sounds present; Soft, Nontender, Nondistended. No hepatomegaly  EXTREMITIES:  2+ Peripheral Pulses, brisk capillary refill. No clubbing, cyanosis, or edema  NERVOUS SYSTEM:  Alert & Oriented X3, speech clear. No deficits.  MSK: FROM all 4 extremities, full and equal strength  SKIN: No rashes or lesions    I&O's Summary    19 Jul 2024 07:01  -  20 Jul 2024 07:00  --------------------------------------------------------  IN: 2987.1 mL / OUT: 1985 mL / NET: 1002.1 mL    20 Jul 2024 07:01  -  21 Jul 2024 04:54  --------------------------------------------------------  IN: 2815.1 mL / OUT: 992 mL / NET: 1823.1 mL        LABS:                        8.1    14.13 )-----------( 103      ( 21 Jul 2024 00:10 )             24.9     07-21    147<H>  |  120<H>  |  52<H>  ----------------------------<  163<H>  3.6   |  14<L>  |  1.40<H>    Ca    7.8<L>      21 Jul 2024 00:10  Phos  4.2     07-21  Mg     1.7     07-21    TPro  4.2<L>  /  Alb  2.1<L>  /  TBili  0.8  /  DBili  x   /  AST  53<H>  /  ALT  38  /  AlkPhos  184<H>  07-21    PT/INR - ( 21 Jul 2024 00:10 )   PT: 11.5 sec;   INR: 1.10 ratio         PTT - ( 21 Jul 2024 00:10 )  PTT:27.1 sec  ABG - ( 21 Jul 2024 00:00 )  pH, Arterial: 7.33  pH, Blood: x     /  pCO2: 30    /  pO2: 136   / HCO3: 16    / Base Excess: -9.2  /  SaO2: 100.0     Urinalysis Basic - ( 21 Jul 2024 00:10 )    Color: x / Appearance: x / SG: x / pH: x  Gluc: 163 mg/dL / Ketone: x  / Bili: x / Urobili: x   Blood: x / Protein: x / Nitrite: x   Leuk Esterase: x / RBC: x / WBC x   Sq Epi: x / Non Sq Epi: x / Bacteria: x      CARDIAC MARKERS ( 21 Jul 2024 00:10 )  x     / x     / 228 U/L / x     / x      CARDIAC MARKERS ( 20 Jul 2024 00:47 )  x     / x     / 137 U/L / x     / x          CAPILLARY BLOOD GLUCOSE      POCT Blood Glucose.: 167 mg/dL (20 Jul 2024 17:08)  POCT Blood Glucose.: 175 mg/dL (20 Jul 2024 12:17)  POCT Blood Glucose.: 107 mg/dL (20 Jul 2024 05:38)      RADIOLOGY & ADDITIONAL TESTS:  Results Reviewed:   Imaging Personally Reviewed:  Electrocardiogram Personally Reviewed: Jodie Okeefe MD PGY-1  ---------------------------------------------------------------------------------------------  Patient is a 78y old  Male who presents with a chief complaint of hypotension (20 Jul 2024 22:17)      HPI:  79 yo male with PMH of CAD s/p PCI x3 with most recent stent 6/12/24 on Plavix, chronic Afib on eliquis, orthostatic hypotension on midodrine, PAD, 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 Patient states since discharge on this past Saturday, he continued to feel ongoing generalized weakness and dizziness with positional changes despite compliance with home midodrine 15mg TID and holding torsemide as instructed. Admits to poor PO intake of fluids/solute due to ongoing issues with poor appetite and nausea with meals which is not new. Denies any fever, chills, chest pain, SOB, cough, abd pain, dysuria nor urinary frequency. Has chronic diarrhea that is unchanged from his baseline.     In the ED, vitals notable for SBP 92-11s. Labs notable for elevated BUN/Cr 31/1.59 (from 30/1.18 on day of discharge 6/29/24). CXR with clear lungs. Abd US with innumerable intrahepatic echogenic masses consistent with known metastatic neuroendocrine tumor. Admitted to medicine for symptomatic hypotension and RAZIA. (02 Jul 2024 18:34)      SUBJECTIVE / OVERNIGHT EVENTS:  Patient       MEDICATIONS  (STANDING):  aMIOdarone    Tablet 400 milliGRAM(s) Oral every 8 hours  aMIOdarone    Tablet 200 milliGRAM(s) Oral daily  aMIOdarone    Tablet   Oral   atorvastatin 80 milliGRAM(s) Oral at bedtime  chlorhexidine 0.12% Liquid 15 milliLiter(s) Oral Mucosa every 12 hours  chlorhexidine 2% Cloths 1 Application(s) Topical <User Schedule>  dexMEDEtomidine Infusion 0.1 MICROgram(s)/kG/Hr (2.47 mL/Hr) IV Continuous <Continuous>  doxazosin 2 milliGRAM(s) Oral at bedtime  folic acid Injectable 1 milliGRAM(s) IV Push daily  insulin lispro (ADMELOG) corrective regimen sliding scale   SubCutaneous every 6 hours  meropenem  IVPB 1000 milliGRAM(s) IV Intermittent every 8 hours  meropenem  IVPB      midodrine 30 milliGRAM(s) Oral every 8 hours  mupirocin 2% Nasal 1 Application(s) Both Nostrils two times a day  norepinephrine Infusion 0.05 MICROgram(s)/kG/Min (9.25 mL/Hr) IV Continuous <Continuous>  pantoprazole Infusion 8 mG/Hr (10 mL/Hr) IV Continuous <Continuous>  phenylephrine    Infusion 0.1 MICROgram(s)/kG/Min (1.85 mL/Hr) IV Continuous <Continuous>  propofol Infusion 20 MICROgram(s)/kG/Min (11.8 mL/Hr) IV Continuous <Continuous>  sodium bicarbonate 1300 milliGRAM(s) Oral three times a day  thiamine Injectable 100 milliGRAM(s) IV Push daily  vancomycin    Solution 125 milliGRAM(s) Oral every 6 hours  vasopressin Infusion 0.04 Unit(s)/Min (6 mL/Hr) IV Continuous <Continuous>    MEDICATIONS  (PRN):    Allergies    No Known Allergies    Intolerances        ICU Vital Signs Last 24 Hrs  T(F): 98 (21 Jul 2024 04:00), Max: 99 (20 Jul 2024 08:00)  HR: 137 (21 Jul 2024 04:30) (100 - 137)  BP: --  BP(mean): --  ABP: 112/54 (21 Jul 2024 04:30) (78/42 - 129/67)  ABP(mean): 78 (21 Jul 2024 04:30) (54 - 94)  RR: 29 (21 Jul 2024 04:30) (18 - 32)  SpO2: 100% (21 Jul 2024 04:30) (97% - 100%)    Mode: AC/ CMV (Assist Control/ Continuous Mandatory Ventilation)  RR (machine): 16  TV (machine): 450  FiO2: 30  PEEP: 5  ITime: 1  MAP: 11  PIP: 23    Physical Exam: ***  GENERAL: NAD, lying in bed comfortably  HEAD:  Atraumatic, Normocephalic  EYES: EOMI, PERRLA, conjunctiva and sclera clear  ENT: Moist mucous membranes  NECK: Supple, No JVD  CHEST/LUNG: Clear to auscultation bilaterally; No rales, rhonchi, wheezing, or rubs. Unlabored respirations  HEART: Regular rate and rhythm; Normal S1/S2. No murmurs, rubs, or gallops  ABDOMEN: Bowel sounds present; Soft, Nontender, Nondistended. No hepatomegaly  EXTREMITIES:  2+ Peripheral Pulses, brisk capillary refill. No clubbing, cyanosis, or edema  NERVOUS SYSTEM:  Alert & Oriented X3, speech clear. No deficits.  MSK: FROM all 4 extremities, full and equal strength  SKIN: No rashes or lesions    I&O's Summary    19 Jul 2024 07:01  -  20 Jul 2024 07:00  --------------------------------------------------------  IN: 2987.1 mL / OUT: 1985 mL / NET: 1002.1 mL    20 Jul 2024 07:01  -  21 Jul 2024 04:54  --------------------------------------------------------  IN: 2815.1 mL / OUT: 992 mL / NET: 1823.1 mL        LABS:                        8.1    14.13 )-----------( 103      ( 21 Jul 2024 00:10 )             24.9     07-21    147<H>  |  120<H>  |  52<H>  ----------------------------<  163<H>  3.6   |  14<L>  |  1.40<H>    Ca    7.8<L>      21 Jul 2024 00:10  Phos  4.2     07-21  Mg     1.7     07-21    TPro  4.2<L>  /  Alb  2.1<L>  /  TBili  0.8  /  DBili  x   /  AST  53<H>  /  ALT  38  /  AlkPhos  184<H>  07-21    PT/INR - ( 21 Jul 2024 00:10 )   PT: 11.5 sec;   INR: 1.10 ratio         PTT - ( 21 Jul 2024 00:10 )  PTT:27.1 sec  ABG - ( 21 Jul 2024 00:00 )  pH, Arterial: 7.33  pH, Blood: x     /  pCO2: 30    /  pO2: 136   / HCO3: 16    / Base Excess: -9.2  /  SaO2: 100.0     Urinalysis Basic - ( 21 Jul 2024 00:10 )    Color: x / Appearance: x / SG: x / pH: x  Gluc: 163 mg/dL / Ketone: x  / Bili: x / Urobili: x   Blood: x / Protein: x / Nitrite: x   Leuk Esterase: x / RBC: x / WBC x   Sq Epi: x / Non Sq Epi: x / Bacteria: x      CARDIAC MARKERS ( 21 Jul 2024 00:10 )  x     / x     / 228 U/L / x     / x      CARDIAC MARKERS ( 20 Jul 2024 00:47 )  x     / x     / 137 U/L / x     / x          CAPILLARY BLOOD GLUCOSE      POCT Blood Glucose.: 167 mg/dL (20 Jul 2024 17:08)  POCT Blood Glucose.: 175 mg/dL (20 Jul 2024 12:17)  POCT Blood Glucose.: 107 mg/dL (20 Jul 2024 05:38)      RADIOLOGY & ADDITIONAL TESTS:  Results Reviewed:   Imaging Personally Reviewed:  Electrocardiogram Personally Reviewed: Jodie Okeefe MD PGY-1  ---------------------------------------------------------------------------------------------  Patient is a 78y old  Male who presents with a chief complaint of hypotension (20 Jul 2024 22:17)      HPI:  79 yo male with PMH of CAD s/p PCI x3 with most recent stent 6/12/24 on Plavix, chronic Afib on eliquis, orthostatic hypotension on midodrine, PAD, 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 Patient states since discharge on this past Saturday, he continued to feel ongoing generalized weakness and dizziness with positional changes despite compliance with home midodrine 15mg TID and holding torsemide as instructed. Admits to poor PO intake of fluids/solute due to ongoing issues with poor appetite and nausea with meals which is not new. Denies any fever, chills, chest pain, SOB, cough, abd pain, dysuria nor urinary frequency. Has chronic diarrhea that is unchanged from his baseline.     In the ED, vitals notable for SBP 92-11s. Labs notable for elevated BUN/Cr 31/1.59 (from 30/1.18 on day of discharge 6/29/24). CXR with clear lungs. Abd US with innumerable intrahepatic echogenic masses consistent with known metastatic neuroendocrine tumor. Admitted to medicine for symptomatic hypotension and RAZIA. (02 Jul 2024 18:34)      SUBJECTIVE / OVERNIGHT EVENTS:  Patient had a Hgb drop yesterday afternoon and required 1 unit pRBC. However, he has not responded adequately. Otherwise, patient has developed a fullness in the right scapular region.     MEDICATIONS  (STANDING):  aMIOdarone    Tablet 400 milliGRAM(s) Oral every 8 hours  aMIOdarone    Tablet 200 milliGRAM(s) Oral daily  aMIOdarone    Tablet   Oral   atorvastatin 80 milliGRAM(s) Oral at bedtime  chlorhexidine 0.12% Liquid 15 milliLiter(s) Oral Mucosa every 12 hours  chlorhexidine 2% Cloths 1 Application(s) Topical <User Schedule>  dexMEDEtomidine Infusion 0.1 MICROgram(s)/kG/Hr (2.47 mL/Hr) IV Continuous <Continuous>  doxazosin 2 milliGRAM(s) Oral at bedtime  folic acid Injectable 1 milliGRAM(s) IV Push daily  insulin lispro (ADMELOG) corrective regimen sliding scale   SubCutaneous every 6 hours  meropenem  IVPB 1000 milliGRAM(s) IV Intermittent every 8 hours  meropenem  IVPB      midodrine 30 milliGRAM(s) Oral every 8 hours  mupirocin 2% Nasal 1 Application(s) Both Nostrils two times a day  norepinephrine Infusion 0.05 MICROgram(s)/kG/Min (9.25 mL/Hr) IV Continuous <Continuous>  pantoprazole Infusion 8 mG/Hr (10 mL/Hr) IV Continuous <Continuous>  phenylephrine    Infusion 0.1 MICROgram(s)/kG/Min (1.85 mL/Hr) IV Continuous <Continuous>  propofol Infusion 20 MICROgram(s)/kG/Min (11.8 mL/Hr) IV Continuous <Continuous>  sodium bicarbonate 1300 milliGRAM(s) Oral three times a day  thiamine Injectable 100 milliGRAM(s) IV Push daily  vancomycin    Solution 125 milliGRAM(s) Oral every 6 hours  vasopressin Infusion 0.04 Unit(s)/Min (6 mL/Hr) IV Continuous <Continuous>    MEDICATIONS  (PRN):    Allergies    No Known Allergies    Intolerances        ICU Vital Signs Last 24 Hrs  T(F): 98 (21 Jul 2024 04:00), Max: 99 (20 Jul 2024 08:00)  HR: 137 (21 Jul 2024 04:30) (100 - 137)  BP: --  BP(mean): --  ABP: 112/54 (21 Jul 2024 04:30) (78/42 - 129/67)  ABP(mean): 78 (21 Jul 2024 04:30) (54 - 94)  RR: 29 (21 Jul 2024 04:30) (18 - 32)  SpO2: 100% (21 Jul 2024 04:30) (97% - 100%)    Mode: AC/ CMV (Assist Control/ Continuous Mandatory Ventilation)  RR (machine): 16  TV (machine): 450  FiO2: 30  PEEP: 5  ITime: 1  MAP: 11  PIP: 23    Physical Exam: ***  GENERAL: NAD, lying in bed comfortably  HEAD:  Atraumatic, Normocephalic  EYES: EOMI, PERRLA, conjunctiva and sclera clear  ENT: Moist mucous membranes  NECK: Supple, No JVD  CHEST/LUNG: Clear to auscultation bilaterally; No rales, rhonchi, wheezing, or rubs. Unlabored respirations  HEART: Regular rate and rhythm; Normal S1/S2. No murmurs, rubs, or gallops  ABDOMEN: Bowel sounds present; Soft, Nontender, Nondistended. No hepatomegaly  EXTREMITIES:  2+ Peripheral Pulses, brisk capillary refill. No clubbing, cyanosis, or edema  NERVOUS SYSTEM:  Alert & Oriented X3, speech clear. No deficits.  MSK: FROM all 4 extremities, full and equal strength  SKIN: No rashes or lesions    I&O's Summary    19 Jul 2024 07:01  -  20 Jul 2024 07:00  --------------------------------------------------------  IN: 2987.1 mL / OUT: 1985 mL / NET: 1002.1 mL    20 Jul 2024 07:01  -  21 Jul 2024 04:54  --------------------------------------------------------  IN: 2815.1 mL / OUT: 992 mL / NET: 1823.1 mL        LABS:                        8.1    14.13 )-----------( 103      ( 21 Jul 2024 00:10 )             24.9     07-21    147<H>  |  120<H>  |  52<H>  ----------------------------<  163<H>  3.6   |  14<L>  |  1.40<H>    Ca    7.8<L>      21 Jul 2024 00:10  Phos  4.2     07-21  Mg     1.7     07-21    TPro  4.2<L>  /  Alb  2.1<L>  /  TBili  0.8  /  DBili  x   /  AST  53<H>  /  ALT  38  /  AlkPhos  184<H>  07-21    PT/INR - ( 21 Jul 2024 00:10 )   PT: 11.5 sec;   INR: 1.10 ratio         PTT - ( 21 Jul 2024 00:10 )  PTT:27.1 sec  ABG - ( 21 Jul 2024 00:00 )  pH, Arterial: 7.33  pH, Blood: x     /  pCO2: 30    /  pO2: 136   / HCO3: 16    / Base Excess: -9.2  /  SaO2: 100.0     Urinalysis Basic - ( 21 Jul 2024 00:10 )    Color: x / Appearance: x / SG: x / pH: x  Gluc: 163 mg/dL / Ketone: x  / Bili: x / Urobili: x   Blood: x / Protein: x / Nitrite: x   Leuk Esterase: x / RBC: x / WBC x   Sq Epi: x / Non Sq Epi: x / Bacteria: x      CARDIAC MARKERS ( 21 Jul 2024 00:10 )  x     / x     / 228 U/L / x     / x      CARDIAC MARKERS ( 20 Jul 2024 00:47 )  x     / x     / 137 U/L / x     / x          CAPILLARY BLOOD GLUCOSE      POCT Blood Glucose.: 167 mg/dL (20 Jul 2024 17:08)  POCT Blood Glucose.: 175 mg/dL (20 Jul 2024 12:17)  POCT Blood Glucose.: 107 mg/dL (20 Jul 2024 05:38)      RADIOLOGY & ADDITIONAL TESTS:  Results Reviewed:   Imaging Personally Reviewed:  Electrocardiogram Personally Reviewed: Jodie Okeefe MD PGY-1  ---------------------------------------------------------------------------------------------  Patient is a 78y old  Male who presents with a chief complaint of hypotension (20 Jul 2024 22:17)    HPI:  77 yo male with PMH of CAD s/p PCI x3 with most recent stent 6/12/24 on Plavix, chronic Afib on eliquis, orthostatic hypotension on midodrine, PAD, 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 Patient states since discharge on this past Saturday, he continued to feel ongoing generalized weakness and dizziness with positional changes despite compliance with home midodrine 15mg TID and holding torsemide as instructed. Admits to poor PO intake of fluids/solute due to ongoing issues with poor appetite and nausea with meals which is not new. Denies any fever, chills, chest pain, SOB, cough, abd pain, dysuria nor urinary frequency. Has chronic diarrhea that is unchanged from his baseline.     In the ED, vitals notable for SBP 92-11s. Labs notable for elevated BUN/Cr 31/1.59 (from 30/1.18 on day of discharge 6/29/24). CXR with clear lungs. Abd US with innumerable intrahepatic echogenic masses consistent with known metastatic neuroendocrine tumor. Admitted to medicine for symptomatic hypotension and RAZIA. (02 Jul 2024 18:34)    SUBJECTIVE / OVERNIGHT EVENTS:  Patient had a Hgb drop yesterday afternoon and required 1 unit pRBC. However, he has not responded adequately (Hgb 7.6 -> 8.1 -> 7.9). Otherwise, patient has developed a fullness in the right scapular region.     Patient examined at bedside today. Patient arousable but history limited by sedation.    Gtt prop 25, henny 4, levo 0.08, vaso 0.04    Vent: AC/CMV  RR 16    PEEP 5  FiO2 30%    MEDICATIONS  (STANDING):  aMIOdarone    Tablet 400 milliGRAM(s) Oral every 8 hours  aMIOdarone    Tablet 200 milliGRAM(s) Oral daily  aMIOdarone    Tablet   Oral   atorvastatin 80 milliGRAM(s) Oral at bedtime  chlorhexidine 0.12% Liquid 15 milliLiter(s) Oral Mucosa every 12 hours  chlorhexidine 2% Cloths 1 Application(s) Topical <User Schedule>  dexMEDEtomidine Infusion 0.1 MICROgram(s)/kG/Hr (2.47 mL/Hr) IV Continuous <Continuous>  doxazosin 2 milliGRAM(s) Oral at bedtime  folic acid Injectable 1 milliGRAM(s) IV Push daily  insulin lispro (ADMELOG) corrective regimen sliding scale   SubCutaneous every 6 hours  meropenem  IVPB 1000 milliGRAM(s) IV Intermittent every 8 hours  meropenem  IVPB      midodrine 30 milliGRAM(s) Oral every 8 hours  mupirocin 2% Nasal 1 Application(s) Both Nostrils two times a day  norepinephrine Infusion 0.05 MICROgram(s)/kG/Min (9.25 mL/Hr) IV Continuous <Continuous>  pantoprazole Infusion 8 mG/Hr (10 mL/Hr) IV Continuous <Continuous>  phenylephrine    Infusion 0.1 MICROgram(s)/kG/Min (1.85 mL/Hr) IV Continuous <Continuous>  propofol Infusion 20 MICROgram(s)/kG/Min (11.8 mL/Hr) IV Continuous <Continuous>  sodium bicarbonate 1300 milliGRAM(s) Oral three times a day  thiamine Injectable 100 milliGRAM(s) IV Push daily  vancomycin    Solution 125 milliGRAM(s) Oral every 6 hours  vasopressin Infusion 0.04 Unit(s)/Min (6 mL/Hr) IV Continuous <Continuous>    MEDICATIONS  (PRN):    Allergies    No Known Allergies    ICU Vital Signs Last 24 Hrs  T(F): 98 (21 Jul 2024 04:00), Max: 99 (20 Jul 2024 08:00)  HR: 137 (21 Jul 2024 04:30) (100 - 137)  BP: --  BP(mean): --  ABP: 112/54 (21 Jul 2024 04:30) (78/42 - 129/67)  ABP(mean): 78 (21 Jul 2024 04:30) (54 - 94)  RR: 29 (21 Jul 2024 04:30) (18 - 32)  SpO2: 100% (21 Jul 2024 04:30) (97% - 100%)    Physical Exam:   GENERAL: NAD, lying in bed, sedated, intubated  HEAD:  Atraumatic, Normocephalic  EYES: EOMI, PERRLA, conjunctiva and sclera clear  CHEST/LUNG: Breath sounds bilaterally. Unlabored respirations  HEART: Tachycardic, irregular rhythm  ABDOMEN: Bowel sounds present; Soft, Nontender, Nondistended  EXTREMITIES: Bilateral edema of upper and lower extremities  NERVOUS SYSTEM: Unable to assess  SKIN: Fullness in right scapular region, no discoloration visible    I&O's Summary    19 Jul 2024 07:01  -  20 Jul 2024 07:00  --------------------------------------------------------  IN: 2987.1 mL / OUT: 1985 mL / NET: 1002.1 mL    20 Jul 2024 07:01  -  21 Jul 2024 04:54  --------------------------------------------------------  IN: 2815.1 mL / OUT: 992 mL / NET: 1823.1 mL        LABS:                        8.1    14.13 )-----------( 103      ( 21 Jul 2024 00:10 )             24.9     07-21    147<H>  |  120<H>  |  52<H>  ----------------------------<  163<H>  3.6   |  14<L>  |  1.40<H>    Ca    7.8<L>      21 Jul 2024 00:10  Phos  4.2     07-21  Mg     1.7     07-21    TPro  4.2<L>  /  Alb  2.1<L>  /  TBili  0.8  /  DBili  x   /  AST  53<H>  /  ALT  38  /  AlkPhos  184<H>  07-21    PT/INR - ( 21 Jul 2024 00:10 )   PT: 11.5 sec;   INR: 1.10 ratio         PTT - ( 21 Jul 2024 00:10 )  PTT:27.1 sec  ABG - ( 21 Jul 2024 00:00 )  pH, Arterial: 7.33  pH, Blood: x     /  pCO2: 30    /  pO2: 136   / HCO3: 16    / Base Excess: -9.2  /  SaO2: 100.0     Urinalysis Basic - ( 21 Jul 2024 00:10 )    Color: x / Appearance: x / SG: x / pH: x  Gluc: 163 mg/dL / Ketone: x  / Bili: x / Urobili: x   Blood: x / Protein: x / Nitrite: x   Leuk Esterase: x / RBC: x / WBC x   Sq Epi: x / Non Sq Epi: x / Bacteria: x      CARDIAC MARKERS ( 21 Jul 2024 00:10 )  x     / x     / 228 U/L / x     / x      CARDIAC MARKERS ( 20 Jul 2024 00:47 )  x     / x     / 137 U/L / x     / x          CAPILLARY BLOOD GLUCOSE      POCT Blood Glucose.: 167 mg/dL (20 Jul 2024 17:08)  POCT Blood Glucose.: 175 mg/dL (20 Jul 2024 12:17)  POCT Blood Glucose.: 107 mg/dL (20 Jul 2024 05:38)      RADIOLOGY & ADDITIONAL TESTS:  Results Reviewed:   Imaging Personally Reviewed:  Electrocardiogram Personally Reviewed:

## 2024-07-21 NOTE — PROGRESS NOTE ADULT - ATTENDING COMMENTS
Still with melena  Required prbc yesterday and today with intermittent bump in hgb  Pressors relatively stable  CTA negative  Suspect this is oozing from multiple ulcers in duodenum  Hold on repeat endoscopy at this time, c/w supportive care including iv ppi and transfusions  gi to follow

## 2024-07-21 NOTE — PROGRESS NOTE ADULT - SUBJECTIVE AND OBJECTIVE BOX
Patient is a 78y old  Male who presents with a chief complaint of hypotension (21 Jul 2024 04:54)    Patient seen and examined at bedside, remains intubated in the MICU.  MEDICATIONS  (STANDING):  aMIOdarone    Tablet 200 milliGRAM(s) Oral daily  aMIOdarone    Tablet   Oral   atorvastatin 80 milliGRAM(s) Oral at bedtime  chlorhexidine 0.12% Liquid 15 milliLiter(s) Oral Mucosa every 12 hours  chlorhexidine 2% Cloths 1 Application(s) Topical <User Schedule>  dexMEDEtomidine Infusion 0.1 MICROgram(s)/kG/Hr (2.47 mL/Hr) IV Continuous <Continuous>  doxazosin 2 milliGRAM(s) Oral at bedtime  folic acid Injectable 1 milliGRAM(s) IV Push daily  insulin lispro (ADMELOG) corrective regimen sliding scale   SubCutaneous every 6 hours  meropenem  IVPB 1000 milliGRAM(s) IV Intermittent every 8 hours  meropenem  IVPB      midodrine 30 milliGRAM(s) Oral every 8 hours  mupirocin 2% Nasal 1 Application(s) Both Nostrils two times a day  norepinephrine Infusion 0.05 MICROgram(s)/kG/Min (9.25 mL/Hr) IV Continuous <Continuous>  pantoprazole Infusion 8 mG/Hr (10 mL/Hr) IV Continuous <Continuous>  phenylephrine    Infusion 0.1 MICROgram(s)/kG/Min (1.85 mL/Hr) IV Continuous <Continuous>  propofol Infusion 20 MICROgram(s)/kG/Min (11.8 mL/Hr) IV Continuous <Continuous>  sodium bicarbonate 1300 milliGRAM(s) Oral three times a day  thiamine Injectable 100 milliGRAM(s) IV Push daily  vancomycin    Solution 125 milliGRAM(s) Oral every 6 hours  vasopressin Infusion 0.04 Unit(s)/Min (6 mL/Hr) IV Continuous <Continuous>    MEDICATIONS  (PRN):    Vital Signs Last 24 Hrs  T(C): 36.2 (21 Jul 2024 08:00), Max: 37.2 (21 Jul 2024 00:00)  T(F): 97.2 (21 Jul 2024 08:00), Max: 99 (21 Jul 2024 00:00)  HR: 135 (21 Jul 2024 09:15) (108 - 142)  BP: --  BP(mean): --  RR: 20 (21 Jul 2024 09:15) (18 - 32)  SpO2: 100% (21 Jul 2024 09:15) (97% - 100%)    Parameters below as of 21 Jul 2024 08:00  Patient On (Oxygen Delivery Method): ventilator    O2 Concentration (%): 30    PE  NAD  Intubated  MMM  No c/c/e  No rash grossly                          7.9    13.41 )-----------( 108      ( 21 Jul 2024 08:16 )             24.4       07-21    147<H>  |  120<H>  |  52<H>  ----------------------------<  163<H>  3.6   |  14<L>  |  1.40<H>    Ca    7.8<L>      21 Jul 2024 00:10  Phos  4.2     07-21  Mg     1.7     07-21    TPro  4.2<L>  /  Alb  2.1<L>  /  TBili  0.8  /  DBili  x   /  AST  53<H>  /  ALT  38  /  AlkPhos  184<H>  07-21

## 2024-07-21 NOTE — PROGRESS NOTE ADULT - ASSESSMENT
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, PAD, and orthostatic hypotension on midodrine presenting for lightheadedness He was admitted for initial complaint of lightheadedness and low bp despite midodrine, now with melena and coffee ground emesis and acute blood loss anemia, found to hemorrhagic shock due to duodenal bleed s/p GDA embolization and rebleed found to have duodenal ulcer with active oozing s/p clip, epi, and nexpowde.r     Impression:  #Hemorrhagic Shock  #Acute on chronic blood loss anemia  #Duodenal bleed s/p GDA embolization  #LA Grade B Esophagitis  #C-diff infection  #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 Found to have active bleed in second portion of duodenum, unable to identify source underneath large clot on EGD so underwent empiric embolization of GDA by IR for bleeding control. Hgb started dropping after starting cangrelor with worsening renal and liver failure. Repeat EGD 7/12 showed grade D esophagitis, organized clots with extensive duodenal ulcers (some with pigmented spots) w/o active bleeding, treated with purastat. Rebled 7/18 with Hgb drop two points in 9 hours s/p repeat EGD showing duodenal ulcer with active oozing and another ulcer with with visible vessel. 3 Clips and epi applied to oozing ulcer with hemostasis. Nexpowder applied to both ulcers for further hemostasis. No further bleeding since procedure    Recommendations:  - Continue PPI gtt  - Hold plavix and eliquis if able  - If rebleeds, may require surgical intervention  - Trend cbc and coags, maintain active T&S, transfuse Hgb>8  - Vancomycin PO for c-diff infection  - Respiratory and circulatory support per ICU team  - Appreciate palliative care involvement given guarded prognosis  - Will need PPI BID for 8 weeks for grade B esophagitis and PUD after off PPI gtt     All recommendations are tentative until note is attested by attending.     Lu Neumann, PGY-6  Gastroenterology/Hepatology Fellow  Available on [employer] Teams  [phone] ([facility] Short Range Pager)  [phone] ([facility] Long Range Pager)    On Weekends/Holidays (All day) and Weekdays after 5 PM to 8AM:  For non-urgent consults, please email [email or web address] or [email or web address]  For emergent consults, please contact on call GI team.  See Amion schedule ([facility]), [employer] paging system ([facility]), or call hospital  ([facility])

## 2024-07-21 NOTE — PROGRESS NOTE ADULT - ASSESSMENT
77 yo male with PMH of CAD s/p PCI x3 with most recent stent 6/12/24 on Plavix, chronic Afib on eliquis, orthostatic hypotension on midodrine, PAD, 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 Patient states since discharge on this past Saturday, he continued to feel ongoing generalized weakness and dizziness with positional changes despite compliance with home midodrine 15mg TID and holding torsemide as instructed as of day PTA . Admits to poor PO intake of fluids/solute due to ongoing issues with poor appetite and nausea with meals which is not new. In the ED, vitals notable for SBP 92-11s. Labs notable for elevated BUN/Cr 31/1.59 (from 30/1.18 on day of discharge 6/29/24). CXR with clear lungs. Abd US with innumerable intrahepatic echogenic masses consistent with known metastatic neuroendocrine tumor. Admitted to medicine for symptomatic hypotension and RAZIA.      1- CKD III    2-  hypotension   3- hx chf   4- pericarditis   5- C Diff   6- acidosis       RAZIA in setting of hypotension, anemia -> GIB, hemorrhagic shock leading to oliguric ATN  received CT IV contrast, and emergently went to IR for mesenteric embolization 7/9  non oliguric   bp support needed with  midodrine 30 mg tid as well as northera   amio per icu team  IV alb  as needed   vanco 125 mg q 6 enteral   cont  sodium bicarb 1950  mg tid  may need bicarb drip   hypernatremia trend na holding off on adding free water   strict I/O  monitor creatinine closely and lytes

## 2024-07-21 NOTE — CONSULT NOTE ADULT - ASSESSMENT
Interventional Radiology    HPI: 77 yo male with history of CAD s/p PCI x3 (most recently 6/12/24 on Plavix, recent cath 6/24/24), afib on Eliquis, orthostatic hypotension on midodrine, PAD, multiple spine surgeries including ALIF, and metastatic neuroendocrine tumor admitted for hypotension and RAZIA. Course complicated by recurrent GIBs requiring multiple transfusions as well as GDA embolization 7/9 and hemostatic agents/clips on EGD 7/12 and 7/18. Patient currently normotensive on 4 henny 0.08 levo 0.04 vaso though in afib with RVR. Continues to have melanotic stool. Received 2u prbc in the last 2 days, H&H steady between 7-8. CTA with no obvious areas of extrav. IR consulted for possible angiogram and embolization.    Allergies: No Known Allergies    Medications (Abx/Cardiac/Anticoagulation/Blood Products)  aMIOdarone    Tablet: 400 milliGRAM(s) Oral (07-21 @ 05:57)  doxazosin: 2 milliGRAM(s) Oral (07-20 @ 21:30)  meropenem  IVPB: 100 mL/Hr IV Intermittent (07-21 @ 14:31)  midodrine: 30 milliGRAM(s) Oral (07-21 @ 14:31)  norepinephrine Infusion: 9.25 mL/Hr IV Continuous (07-20 @ 21:32)  phenylephrine    Infusion: 1.85 mL/Hr IV Continuous (07-21 @ 10:01)  vancomycin    Solution: 125 milliGRAM(s) Oral (07-19 @ 23:57)  vancomycin    Solution: 125 milliGRAM(s) Oral (07-21 @ 17:06)    Data:    125.6  T(C): 36.8  HR: 126  RR: 20  SpO2: 100%    -WBC 12.89 / HgB 7.8 / Hct 23.3 / Plt 111  -Na 147 / Cl 120 / BUN 52 / Glucose 163  -K 3.6 / CO2 14 / Cr 1.40  -ALT 38 / Alk Phos 184 / T.Bili 0.8  -INR 1.10 / PTT 27.1    Assessment/Plan:  RISAJINANDO JEROME (MRN-[id number])  77 yo male with history of CAD s/p PCI x3 (most recently 6/12/24 on Plavix, recent cath 6/24/24), afib on Eliquis, orthostatic hypotension on midodrine, PAD, multiple spine surgeries including ALIF, and metastatic neuroendocrine tumor admitted for hypotension and RAZIA. Course complicated by recurrent GIBs requiring multiple transfusions as well as GDA embolization 7/9 and hemostatic agents/clips on EGD 7/12 and 7/18. Patient currently normotensive on 4 henny 0.08 levo 0.04 vaso though in afib with RVR. Continues to have melanotic stool. Received 2u prbc in the last 2 days, H&H steady between 7-8. CTA with no obvious areas of extrav. IR consulted for possible angiogram and embolization.    In the absences of any active extravasation on CTA there's no place to target for an embolization. Recommend continued medical management and resuscitation.    --  Davina Muller, PGY-4  Vascular and Interventional Radiology   Available on [employer] Teams    - Non-emergent consults: Place IR consult order in [place]  - Emergent issues (pager): [facility] [phone]; [facility] [phone]; [phone]  - Scheduling questions: [facility] [phone]; [facility] [phone]  - Clinic/outpatient booking: [facility] [phone]; [facility] [phone] Interventional Radiology    HPI: 79 yo male with history of CAD s/p PCI x3 (most recently 6/12/24 on Plavix, recent cath 6/24/24), afib on Eliquis, orthostatic hypotension on midodrine, PAD, multiple spine surgeries including ALIF, and metastatic neuroendocrine tumor admitted for hypotension and RAZIA. Course complicated by recurrent GIBs requiring multiple transfusions as well as GDA embolization 7/9 and hemostatic agents/clips on EGD 7/12 and 7/18. Patient currently normotensive on 4 henny 0.08 levo 0.04 vaso though in afib with RVR. Continues to have melanotic stool. Received 2u prbc in the last 2 days, H&H steady between 7-8. CTA with no obvious areas of extrav. IR consulted for possible angiogram and embolization.    Allergies: No Known Allergies    Medications (Abx/Cardiac/Anticoagulation/Blood Products)  aMIOdarone    Tablet: 400 milliGRAM(s) Oral (07-21 @ 05:57)  doxazosin: 2 milliGRAM(s) Oral (07-20 @ 21:30)  meropenem  IVPB: 100 mL/Hr IV Intermittent (07-21 @ 14:31)  midodrine: 30 milliGRAM(s) Oral (07-21 @ 14:31)  norepinephrine Infusion: 9.25 mL/Hr IV Continuous (07-20 @ 21:32)  phenylephrine    Infusion: 1.85 mL/Hr IV Continuous (07-21 @ 10:01)  vancomycin    Solution: 125 milliGRAM(s) Oral (07-19 @ 23:57)  vancomycin    Solution: 125 milliGRAM(s) Oral (07-21 @ 17:06)    Data:    125.6  T(C): 36.8  HR: 126  RR: 20  SpO2: 100%    -WBC 12.89 / HgB 7.8 / Hct 23.3 / Plt 111  -Na 147 / Cl 120 / BUN 52 / Glucose 163  -K 3.6 / CO2 14 / Cr 1.40  -ALT 38 / Alk Phos 184 / T.Bili 0.8  -INR 1.10 / PTT 27.1    Assessment/Plan:  RISAJINANDO JEROME (MRN-[id number])  79 yo male with history of CAD s/p PCI x3 (most recently 6/12/24 on Plavix, recent cath 6/24/24), afib on Eliquis, orthostatic hypotension on midodrine, PAD, multiple spine surgeries including ALIF, and metastatic neuroendocrine tumor admitted for hypotension and RAZIA. Course complicated by recurrent GIBs requiring multiple transfusions as well as GDA embolization 7/9 and hemostatic agents/clips on EGD 7/12 and 7/18. Patient currently normotensive on 4 henny 0.08 levo 0.04 vaso though in afib with RVR. Continues to have melanotic stool. Received 2u prbc in the last 2 days, H&H steady between 7-8. CTA with no definite areas of extrav. IR consulted for possible angiogram and embolization.    In the absence of any active extravasation on CTA in this patient who is already s/p empiric GDA embolization, there's no target for embolization. Recommend continued medical management and resuscitation.    --  Davina Muller, PGY-4  Vascular and Interventional Radiology   Available on [employer] Teams    - Non-emergent consults: Place IR consult order in [place]  - Emergent issues (pager): [facility] [phone]; [facility] [phone]; [phone]  - Scheduling questions: [facility] [phone]; [facility] [phone]  - Clinic/outpatient booking: [facility] [phone]; [facility] [phone]

## 2024-07-21 NOTE — CONSULT NOTE ADULT - SUBJECTIVE AND OBJECTIVE BOX
SURGERY CONSULT NOTE    Consulting Team: MICU    Patient: NANDO HERNANDEZ , 78y ([birth year])Male   MRN: [id number]  Location: [place] [address]  Visit: 07-02-24 Inpatient  Date: 07-21-24 @ 10:45      HPI:  79 yo male with PMH of CAD s/p PCI x3 with most recent stent 6/12/24 on Plavix, chronic Afib on eliquis, orthostatic hypotension on midodrine, PAD, 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 Patient states since discharge on this past Saturday, he continued to feel ongoing generalized weakness and dizziness with positional changes despite compliance with home midodrine 15mg TID and holding torsemide as instructed. Admits to poor PO intake of fluids/solute due to ongoing issues with poor appetite and nausea with meals which is not new. Denies any fever, chills, chest pain, SOB, cough, abd pain, dysuria nor urinary frequency. Has chronic diarrhea that is unchanged from his baseline.     In the ED, vitals notable for SBP 92-11s. Labs notable for elevated BUN/Cr 31/1.59 (from 30/1.18 on day of discharge 6/29/24). CXR with clear lungs. Abd US with innumerable intrahepatic echogenic masses consistent with known metastatic neuroendocrine tumor. Admitted to medicine for symptomatic hypotension and RAZIA. (02 Jul 2024 18:34)      General surgery consulted 7/21 for evaluation given history of recurrent GI bleeds. In review, patient admitted hypotension and RAZIA. On 7/8 he developed melena and coffee ground emesis; Eliquis was stopped. On 7/9 a RRT was called for hypotension with H&H drop 9.2/28.4-->7.4/23.2. Plavix was stopped and on EGD was found to have active bleeding from a D2 ulcer however unable to visualize source of bleed; underwent GDA embolization with IR 7/9 and was admitted to MICU intubated post procedure. 7/12 EGD showed esophagitis and multiple duodenal ulcers without active bleeding, Purastat applied. Extubated 7/15. Cangrelor and Plavix restarted intermittently but ultimately discontinued due to GIB. On 7/18 again required transfusions for H&H drops 9.1/27.6-->7.7/22.0, he was reintubated and a bleeding ulcer was treated with 3 clips, epi, and Nexpowder. Nexpowder was applied to an additional ulcer which was not bleeding but had a visible vessel. Since 7/18 EGD patient has received 2u pRBC (once on 7/19 and once on 7/20), H&H stable overnight. Remains on pressors. Daughter at bedside.       PAST MEDICAL HISTORY:  Hypertension  Hypercholesterolemia  PAD (peripheral artery disease)  Neuroendocrine carcinoma  Hepatic carcinoma  Atrial fibrillation and flutter  CAD (coronary artery disease)        PAST SURGICAL HISTORY:  S/P knee replacement, bilateral  S/P hip replacement  History of hernia repair  H/O laminectomy  History of lumbosacral spine surgery  History of cardioversion        MEDICATIONS:  aMIOdarone    Tablet   Oral   aMIOdarone    Tablet 200 milliGRAM(s) Oral daily  atorvastatin 80 milliGRAM(s) Oral at bedtime  chlorhexidine 0.12% Liquid 15 milliLiter(s) Oral Mucosa every 12 hours  chlorhexidine 2% Cloths 1 Application(s) Topical <User Schedule>  dexMEDEtomidine Infusion 0.1 MICROgram(s)/kG/Hr IV Continuous <Continuous>  doxazosin 2 milliGRAM(s) Oral at bedtime  folic acid Injectable 1 milliGRAM(s) IV Push daily  hydrocortisone sodium succinate Injectable 50 milliGRAM(s) IV Push every 6 hours  insulin lispro (ADMELOG) corrective regimen sliding scale   SubCutaneous every 6 hours  meropenem  IVPB 1000 milliGRAM(s) IV Intermittent every 8 hours  meropenem  IVPB      midodrine 30 milliGRAM(s) Oral every 8 hours  mupirocin 2% Nasal 1 Application(s) Both Nostrils two times a day  norepinephrine Infusion 0.05 MICROgram(s)/kG/Min IV Continuous <Continuous>  pantoprazole Infusion 8 mG/Hr IV Continuous <Continuous>  phenylephrine    Infusion 0.1 MICROgram(s)/kG/Min IV Continuous <Continuous>  propofol Infusion 20 MICROgram(s)/kG/Min IV Continuous <Continuous>  sodium bicarbonate 1950 milliGRAM(s) Oral three times a day  thiamine Injectable 100 milliGRAM(s) IV Push daily  vancomycin    Solution 125 milliGRAM(s) Oral every 6 hours  vasopressin Infusion 0.04 Unit(s)/Min IV Continuous <Continuous>      ALLERGIES:  No Known Allergies      VITALS & I/Os:  Vital Signs Last 24 Hrs  T(C): 36.2 (21 Jul 2024 08:00), Max: 37.2 (21 Jul 2024 00:00)  T(F): 97.2 (21 Jul 2024 08:00), Max: 99 (21 Jul 2024 00:00)  HR: 136 (21 Jul 2024 09:45) (117 - 142)  BP: --  BP(mean): --  RR: 21 (21 Jul 2024 09:45) (18 - 32)  SpO2: 100% (21 Jul 2024 09:45) (97% - 100%)    Parameters below as of 21 Jul 2024 08:00  Patient On (Oxygen Delivery Method): ventilator    O2 Concentration (%): 30  Mode: CPAP with PS  FiO2: 30  PEEP: 5  PS: 10  MAP: 9  PIP: 17    I&O's Summary    20 Jul 2024 07:01  -  21 Jul 2024 07:00  --------------------------------------------------------  IN: 3235.9 mL / OUT: 1132 mL / NET: 2103.9 mL    21 Jul 2024 07:01  -  21 Jul 2024 10:45  --------------------------------------------------------  IN: 0 mL / OUT: 170 mL / NET: -170 mL        PHYSICAL EXAM:  General: Intubated, sedated  Respiratory: Intubated, mechanically ventilated   Cardiovascular: Afib w HR 130s, /52 (75) on henny 4 and vaso 0.04  Abdominal: Soft, distended, mildly TTP in epigastric region. No rebound/guarding  Extremities: [facility]    LABS:                        7.9    13.41 )-----------( 108      ( 21 Jul 2024 08:16 )             24.4     07-21    147<H>  |  120<H>  |  52<H>  ----------------------------<  163<H>  3.6   |  14<L>  |  1.40<H>    Ca    7.8<L>      21 Jul 2024 00:10  Phos  4.2     07-21  Mg     1.7     07-21    TPro  4.2<L>  /  Alb  2.1<L>  /  TBili  0.8  /  DBili  x   /  AST  53<H>  /  ALT  38  /  AlkPhos  184<H>  07-21    Lactate:    PT/INR - ( 21 Jul 2024 00:10 )   PT: 11.5 sec;   INR: 1.10 ratio         PTT - ( 21 Jul 2024 00:10 )  PTT:27.1 sec  ABG - ( 21 Jul 2024 00:00 )  pH, Arterial: 7.33  pH, Blood: x     /  pCO2: 30    /  pO2: 136   / HCO3: 16    / Base Excess: -9.2  /  SaO2: 100.0             CARDIAC MARKERS ( 21 Jul 2024 00:10 )  x     / x     / 228 U/L / x     / x      CARDIAC MARKERS ( 20 Jul 2024 00:47 )  x     / x     / 137 U/L / x     / x            Urinalysis Basic - ( 21 Jul 2024 00:10 )    Color: x / Appearance: x / SG: x / pH: x  Gluc: 163 mg/dL / Ketone: x  / Bili: x / Urobili: x   Blood: x / Protein: x / Nitrite: x   Leuk Esterase: x / RBC: x / WBC x   Sq Epi: x / Non Sq Epi: x / Bacteria: x          IMAGING:       SURGERY CONSULT NOTE    Consulting Team: MICU    Patient: NANDO HERNANDEZ , 78y ([birth year])Male   MRN: [id number]  Location: [place] [address]  Visit: 07-02-24 Inpatient  Date: 07-21-24 @ 10:45      HPI:  79 yo male with PMH of CAD s/p PCI x3 with most recent stent 6/12/24 on Plavix, chronic Afib on eliquis, orthostatic hypotension on midodrine, PAD, 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 Patient states since discharge on this past Saturday, he continued to feel ongoing generalized weakness and dizziness with positional changes despite compliance with home midodrine 15mg TID and holding torsemide as instructed. Admits to poor PO intake of fluids/solute due to ongoing issues with poor appetite and nausea with meals which is not new. Denies any fever, chills, chest pain, SOB, cough, abd pain, dysuria nor urinary frequency. Has chronic diarrhea that is unchanged from his baseline.     In the ED, vitals notable for SBP 92-11s. Labs notable for elevated BUN/Cr 31/1.59 (from 30/1.18 on day of discharge 6/29/24). CXR with clear lungs. Abd US with innumerable intrahepatic echogenic masses consistent with known metastatic neuroendocrine tumor. Admitted to medicine for symptomatic hypotension and RAZIA. (02 Jul 2024 18:34)      General surgery consulted 7/21 for evaluation given history of recurrent GI bleeds. In review, patient admitted hypotension and RAZIA. On 7/8 he developed melena and coffee ground emesis; Eliquis was stopped. On 7/9 a RRT was called for hypotension with H&H drop 9.2/28.4-->7.4/23.2. Plavix was stopped and on EGD was found to have active bleeding from a D2 ulcer however unable to visualize source of bleed; underwent GDA embolization with IR 7/9 and was admitted to MICU intubated post procedure. 7/12 EGD showed esophagitis and multiple duodenal ulcers without active bleeding, Purastat applied. Extubated 7/15. Cangrelor and Plavix restarted intermittently but ultimately discontinued due to GIB. On 7/18 again required transfusions for H&H drops 9.1/27.6-->7.7/22.0, he was reintubated and a bleeding ulcer was treated with 3 clips, epi, and Nexpowder. Nexpowder was applied to an additional ulcer which was not bleeding but had a visible vessel. Since 7/18 EGD patient has received 2u pRBC (once on 7/19 and once on 7/20), H&H stable overnight. Remains on pressors. Daughter at bedside.       PAST MEDICAL HISTORY:  Hypertension  Hypercholesterolemia  PAD (peripheral artery disease)  Neuroendocrine carcinoma  Hepatic carcinoma  Atrial fibrillation and flutter  CAD (coronary artery disease)        PAST SURGICAL HISTORY:  S/P knee replacement, bilateral  S/P hip replacement  History of hernia repair  H/O laminectomy  History of lumbosacral spine surgery  ALIF  History of cardioversion        MEDICATIONS:  aMIOdarone    Tablet   Oral   aMIOdarone    Tablet 200 milliGRAM(s) Oral daily  atorvastatin 80 milliGRAM(s) Oral at bedtime  chlorhexidine 0.12% Liquid 15 milliLiter(s) Oral Mucosa every 12 hours  chlorhexidine 2% Cloths 1 Application(s) Topical <User Schedule>  dexMEDEtomidine Infusion 0.1 MICROgram(s)/kG/Hr IV Continuous <Continuous>  doxazosin 2 milliGRAM(s) Oral at bedtime  folic acid Injectable 1 milliGRAM(s) IV Push daily  hydrocortisone sodium succinate Injectable 50 milliGRAM(s) IV Push every 6 hours  insulin lispro (ADMELOG) corrective regimen sliding scale   SubCutaneous every 6 hours  meropenem  IVPB 1000 milliGRAM(s) IV Intermittent every 8 hours  meropenem  IVPB      midodrine 30 milliGRAM(s) Oral every 8 hours  mupirocin 2% Nasal 1 Application(s) Both Nostrils two times a day  norepinephrine Infusion 0.05 MICROgram(s)/kG/Min IV Continuous <Continuous>  pantoprazole Infusion 8 mG/Hr IV Continuous <Continuous>  phenylephrine    Infusion 0.1 MICROgram(s)/kG/Min IV Continuous <Continuous>  propofol Infusion 20 MICROgram(s)/kG/Min IV Continuous <Continuous>  sodium bicarbonate 1950 milliGRAM(s) Oral three times a day  thiamine Injectable 100 milliGRAM(s) IV Push daily  vancomycin    Solution 125 milliGRAM(s) Oral every 6 hours  vasopressin Infusion 0.04 Unit(s)/Min IV Continuous <Continuous>      ALLERGIES:  No Known Allergies      VITALS & I/Os:  Vital Signs Last 24 Hrs  T(C): 36.2 (21 Jul 2024 08:00), Max: 37.2 (21 Jul 2024 00:00)  T(F): 97.2 (21 Jul 2024 08:00), Max: 99 (21 Jul 2024 00:00)  HR: 136 (21 Jul 2024 09:45) (117 - 142)  BP: --  BP(mean): --  RR: 21 (21 Jul 2024 09:45) (18 - 32)  SpO2: 100% (21 Jul 2024 09:45) (97% - 100%)    Parameters below as of 21 Jul 2024 08:00  Patient On (Oxygen Delivery Method): ventilator    O2 Concentration (%): 30  Mode: CPAP with PS  FiO2: 30  PEEP: 5  PS: 10  MAP: 9  PIP: 17    I&O's Summary    20 Jul 2024 07:01  -  21 Jul 2024 07:00  --------------------------------------------------------  IN: 3235.9 mL / OUT: 1132 mL / NET: 2103.9 mL    21 Jul 2024 07:01  -  21 Jul 2024 10:45  --------------------------------------------------------  IN: 0 mL / OUT: 170 mL / NET: -170 mL        PHYSICAL EXAM:  General: Intubated, sedated  Respiratory: Intubated, mechanically ventilated   Cardiovascular: Afib w HR 130s, /52 (75) on henny 4 and vaso 0.04  Abdominal: Soft, distended, mildly TTP in epigastric region. No rebound/guarding  Extremities: [facility]    LABS:                        7.9    13.41 )-----------( 108      ( 21 Jul 2024 08:16 )             24.4     07-21    147<H>  |  120<H>  |  52<H>  ----------------------------<  163<H>  3.6   |  14<L>  |  1.40<H>    Ca    7.8<L>      21 Jul 2024 00:10  Phos  4.2     07-21  Mg     1.7     07-21    TPro  4.2<L>  /  Alb  2.1<L>  /  TBili  0.8  /  DBili  x   /  AST  53<H>  /  ALT  38  /  AlkPhos  184<H>  07-21    Lactate:    PT/INR - ( 21 Jul 2024 00:10 )   PT: 11.5 sec;   INR: 1.10 ratio         PTT - ( 21 Jul 2024 00:10 )  PTT:27.1 sec  ABG - ( 21 Jul 2024 00:00 )  pH, Arterial: 7.33  pH, Blood: x     /  pCO2: 30    /  pO2: 136   / HCO3: 16    / Base Excess: -9.2  /  SaO2: 100.0             CARDIAC MARKERS ( 21 Jul 2024 00:10 )  x     / x     / 228 U/L / x     / x      CARDIAC MARKERS ( 20 Jul 2024 00:47 )  x     / x     / 137 U/L / x     / x            Urinalysis Basic - ( 21 Jul 2024 00:10 )    Color: x / Appearance: x / SG: x / pH: x  Gluc: 163 mg/dL / Ketone: x  / Bili: x / Urobili: x   Blood: x / Protein: x / Nitrite: x   Leuk Esterase: x / RBC: x / WBC x   Sq Epi: x / Non Sq Epi: x / Bacteria: x          IMAGING:

## 2024-07-21 NOTE — PROGRESS NOTE ADULT - ATTENDING COMMENTS
Patient is a 78M extensive history including CAD s/p stent (most recent 6/12/24), Afib on AC, orthostatic hypotension on midodrine, and pancreatic neuroendocrine tumor who presents with hypotension due to acute GI bleed. The patient has had worsening shock state and multiorgan failure.   --- Patient has had a deterioration on 7/18 with recurrent bleeding and acute blood loss anemia    #Neuro - lower sedation to monitor mental status  #Cardiovascular - shock state worsening and patient on 3 vasopressors this AM - lower as able with goal MAP > 65  - Hb dropped again and had episodes of melanotic stool.  - Hold Plavix for now  - RVR while on Levo - continue Amio PO load. Tachycardia also improved while on Propofol  - PRBC transfusion if Hb drops <8.   - NURIA recently was off DAPT for few days in setting of bleeding but Plavix restarted 7/14. Given recurrent bleeding Plavix was stopped. At this point patient has now had recurrent bleeding in setting of being on Plavix and still continues to bleed without plavix and so its unsafe to start it back  --- On 6/12/24 patient had PCI with NURIA to large RPL and patent LAD stent  - Restart statin  #Pulmonary - acute hypoxemic respiratory failure in setting of GI bleed  - Maintain O2 sat > 90%   - Extubated 7/15 but reintubated 7/18 for shock and GI bleed - continue mechanical ventilation and PSV as able  #Gastro - massive GIB s/p EGD x 3 and empiric GDA embolization on 7/9  - Most recent EGD 7/18 noted with duodenal ulcers and visible vessel which required interventions with successful cessation of bleeding  - Now bleeding again, and thus needs CT Angio. Surgery reconsulted. IR consult post CT Angio  - Discussed with GI, IR and Surgery, wait and watch and for now.   - C. diff positive and on PO Vanco - monitor diarrhea  - Continue PPI  - Transaminitis - RUQ sono noted  - Oropharyngeal dysphagia - OGT feeds  #Nephro - acute renal failure due to ATN now improved but still with signs of peripheral edema.   - Attempt diuresis today - IVC large on POCUS and getting significant volume with vasopressors  - Ivory catheter placed by Urology overnight  #Infectious Disease - Continue antibiotics in setting of shock state and fevers  - Monitor temperature curve and follow-up cultures - ETT culture with Klebsiella   - C. diff positive - PO Vanco - diarrhea reportedly improved today  #Endo - monitor FS and adjust insulin as needed for goal FS < 180  #Hem/Onc - pancreatic neuroendocrine tumor  - Outpatient follow-up at [facility] [place] - per Onc seeing patient at [facility] there does appear to be progression of disease  #DVT proph - S

## 2024-07-22 NOTE — PROGRESS NOTE ADULT - SUBJECTIVE AND OBJECTIVE BOX
Jodie Okeefe MD PGY-1  ---------------------------------------------------------------------------------------------  Patient is a 78y old  Male who presents with a chief complaint of hypotension (20 Jul 2024 22:17)    HPI:  79 yo male with PMH of CAD s/p PCI x3 with most recent stent 6/12/24 on Plavix, chronic Afib on eliquis, orthostatic hypotension on midodrine, PAD, 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 Patient states since discharge on this past Saturday, he continued to feel ongoing generalized weakness and dizziness with positional changes despite compliance with home midodrine 15mg TID and holding torsemide as instructed. Admits to poor PO intake of fluids/solute due to ongoing issues with poor appetite and nausea with meals which is not new. Denies any fever, chills, chest pain, SOB, cough, abd pain, dysuria nor urinary frequency. Has chronic diarrhea that is unchanged from his baseline.     In the ED, vitals notable for SBP 92-11s. Labs notable for elevated BUN/Cr 31/1.59 (from 30/1.18 on day of discharge 6/29/24). CXR with clear lungs. Abd US with innumerable intrahepatic echogenic masses consistent with known metastatic neuroendocrine tumor. Admitted to medicine for symptomatic hypotension and RAZIA. (02 Jul 2024 18:34)    SUBJECTIVE / OVERNIGHT EVENTS:      Gtt prop 25, henny 4, levo 0.08, vaso 0.04***    Vent: AC/CMV  RR 16    PEEP 5  FiO2 30%    MEDICATIONS  (STANDING):  aMIOdarone    Tablet 400 milliGRAM(s) Oral every 8 hours  aMIOdarone    Tablet 200 milliGRAM(s) Oral daily  aMIOdarone    Tablet   Oral   atorvastatin 80 milliGRAM(s) Oral at bedtime  chlorhexidine 0.12% Liquid 15 milliLiter(s) Oral Mucosa every 12 hours  chlorhexidine 2% Cloths 1 Application(s) Topical <User Schedule>  dexMEDEtomidine Infusion 0.1 MICROgram(s)/kG/Hr (2.47 mL/Hr) IV Continuous <Continuous>  doxazosin 2 milliGRAM(s) Oral at bedtime  folic acid Injectable 1 milliGRAM(s) IV Push daily  insulin lispro (ADMELOG) corrective regimen sliding scale   SubCutaneous every 6 hours  meropenem  IVPB 1000 milliGRAM(s) IV Intermittent every 8 hours  meropenem  IVPB      midodrine 30 milliGRAM(s) Oral every 8 hours  mupirocin 2% Nasal 1 Application(s) Both Nostrils two times a day  norepinephrine Infusion 0.05 MICROgram(s)/kG/Min (9.25 mL/Hr) IV Continuous <Continuous>  pantoprazole Infusion 8 mG/Hr (10 mL/Hr) IV Continuous <Continuous>  phenylephrine    Infusion 0.1 MICROgram(s)/kG/Min (1.85 mL/Hr) IV Continuous <Continuous>  propofol Infusion 20 MICROgram(s)/kG/Min (11.8 mL/Hr) IV Continuous <Continuous>  sodium bicarbonate 1300 milliGRAM(s) Oral three times a day  thiamine Injectable 100 milliGRAM(s) IV Push daily  vancomycin    Solution 125 milliGRAM(s) Oral every 6 hours  vasopressin Infusion 0.04 Unit(s)/Min (6 mL/Hr) IV Continuous <Continuous>    MEDICATIONS  (PRN):    Allergies    No Known Allergies    ICU Vital Signs Last 24 Hrs  T(F): 98 (21 Jul 2024 04:00), Max: 99 (20 Jul 2024 08:00)  HR: 137 (21 Jul 2024 04:30) (100 - 137)  BP: --  BP(mean): --  ABP: 112/54 (21 Jul 2024 04:30) (78/42 - 129/67)  ABP(mean): 78 (21 Jul 2024 04:30) (54 - 94)  RR: 29 (21 Jul 2024 04:30) (18 - 32)  SpO2: 100% (21 Jul 2024 04:30) (97% - 100%)    Physical Exam:   GENERAL: NAD, lying in bed, sedated, intubated  HEAD:  Atraumatic, Normocephalic  EYES: EOMI, PERRLA, conjunctiva and sclera clear  CHEST/LUNG: Breath sounds bilaterally. Unlabored respirations  HEART: Tachycardic, irregular rhythm  ABDOMEN: Bowel sounds present; Soft, Nontender, Nondistended  EXTREMITIES: Bilateral edema of upper and lower extremities  NERVOUS SYSTEM: Unable to assess  SKIN: Fullness in right scapular region, no discoloration visible    I&O's Summary    19 Jul 2024 07:01  -  20 Jul 2024 07:00  --------------------------------------------------------  IN: 2987.1 mL / OUT: 1985 mL / NET: 1002.1 mL    20 Jul 2024 07:01  -  21 Jul 2024 04:54  --------------------------------------------------------  IN: 2815.1 mL / OUT: 992 mL / NET: 1823.1 mL        LABS:                        8.1    14.13 )-----------( 103      ( 21 Jul 2024 00:10 )             24.9     07-21    147<H>  |  120<H>  |  52<H>  ----------------------------<  163<H>  3.6   |  14<L>  |  1.40<H>    Ca    7.8<L>      21 Jul 2024 00:10  Phos  4.2     07-21  Mg     1.7     07-21    TPro  4.2<L>  /  Alb  2.1<L>  /  TBili  0.8  /  DBili  x   /  AST  53<H>  /  ALT  38  /  AlkPhos  184<H>  07-21    PT/INR - ( 21 Jul 2024 00:10 )   PT: 11.5 sec;   INR: 1.10 ratio         PTT - ( 21 Jul 2024 00:10 )  PTT:27.1 sec  ABG - ( 21 Jul 2024 00:00 )  pH, Arterial: 7.33  pH, Blood: x     /  pCO2: 30    /  pO2: 136   / HCO3: 16    / Base Excess: -9.2  /  SaO2: 100.0     Urinalysis Basic - ( 21 Jul 2024 00:10 )    Color: x / Appearance: x / SG: x / pH: x  Gluc: 163 mg/dL / Ketone: x  / Bili: x / Urobili: x   Blood: x / Protein: x / Nitrite: x   Leuk Esterase: x / RBC: x / WBC x   Sq Epi: x / Non Sq Epi: x / Bacteria: x      CARDIAC MARKERS ( 21 Jul 2024 00:10 )  x     / x     / 228 U/L / x     / x      CARDIAC MARKERS ( 20 Jul 2024 00:47 )  x     / x     / 137 U/L / x     / x          CAPILLARY BLOOD GLUCOSE      POCT Blood Glucose.: 167 mg/dL (20 Jul 2024 17:08)  POCT Blood Glucose.: 175 mg/dL (20 Jul 2024 12:17)  POCT Blood Glucose.: 107 mg/dL (20 Jul 2024 05:38)      RADIOLOGY & ADDITIONAL TESTS:  Results Reviewed:   Imaging Personally Reviewed:  Electrocardiogram Personally Reviewed: Jodie Okeefe MD PGY-1  ---------------------------------------------------------------------------------------------  Patient is a 78y old  Male who presents with a chief complaint of hypotension (20 Jul 2024 22:17)    HPI:  77 yo male with PMH of CAD s/p PCI x3 with most recent stent 6/12/24 on Plavix, chronic Afib on eliquis, orthostatic hypotension on midodrine, PAD, 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 Patient states since discharge on this past Saturday, he continued to feel ongoing generalized weakness and dizziness with positional changes despite compliance with home midodrine 15mg TID and holding torsemide as instructed. Admits to poor PO intake of fluids/solute due to ongoing issues with poor appetite and nausea with meals which is not new. Denies any fever, chills, chest pain, SOB, cough, abd pain, dysuria nor urinary frequency. Has chronic diarrhea that is unchanged from his baseline.     In the ED, vitals notable for SBP 92-11s. Labs notable for elevated BUN/Cr 31/1.59 (from 30/1.18 on day of discharge 6/29/24). CXR with clear lungs. Abd US with innumerable intrahepatic echogenic masses consistent with known metastatic neuroendocrine tumor. Admitted to medicine for symptomatic hypotension and RAZIA. (02 Jul 2024 18:34)    SUBJECTIVE / OVERNIGHT EVENTS:  No overnight events. No episodes of melena.    Today patient examined at bedside. History limited by sedation and intubation.    Gtt prop 15, henny 4  Ivory in place    Vent: AC/CMV  RR 18    PEEP 5  FiO2 30%    MEDICATIONS  (STANDING):  aMIOdarone    Tablet 400 milliGRAM(s) Oral every 8 hours  aMIOdarone    Tablet 200 milliGRAM(s) Oral daily  aMIOdarone    Tablet   Oral   atorvastatin 80 milliGRAM(s) Oral at bedtime  chlorhexidine 0.12% Liquid 15 milliLiter(s) Oral Mucosa every 12 hours  chlorhexidine 2% Cloths 1 Application(s) Topical <User Schedule>  dexMEDEtomidine Infusion 0.1 MICROgram(s)/kG/Hr (2.47 mL/Hr) IV Continuous <Continuous>  doxazosin 2 milliGRAM(s) Oral at bedtime  folic acid Injectable 1 milliGRAM(s) IV Push daily  insulin lispro (ADMELOG) corrective regimen sliding scale   SubCutaneous every 6 hours  meropenem  IVPB 1000 milliGRAM(s) IV Intermittent every 8 hours  meropenem  IVPB      midodrine 30 milliGRAM(s) Oral every 8 hours  mupirocin 2% Nasal 1 Application(s) Both Nostrils two times a day  norepinephrine Infusion 0.05 MICROgram(s)/kG/Min (9.25 mL/Hr) IV Continuous <Continuous>  pantoprazole Infusion 8 mG/Hr (10 mL/Hr) IV Continuous <Continuous>  phenylephrine    Infusion 0.1 MICROgram(s)/kG/Min (1.85 mL/Hr) IV Continuous <Continuous>  propofol Infusion 20 MICROgram(s)/kG/Min (11.8 mL/Hr) IV Continuous <Continuous>  sodium bicarbonate 1300 milliGRAM(s) Oral three times a day  thiamine Injectable 100 milliGRAM(s) IV Push daily  vancomycin    Solution 125 milliGRAM(s) Oral every 6 hours  vasopressin Infusion 0.04 Unit(s)/Min (6 mL/Hr) IV Continuous <Continuous>    MEDICATIONS  (PRN):    Allergies    No Known Allergies    ICU Vital Signs Last 24 Hrs  T(F): 98 (21 Jul 2024 04:00), Max: 99 (20 Jul 2024 08:00)  HR: 137 (21 Jul 2024 04:30) (100 - 137)  BP: --  BP(mean): --  ABP: 112/54 (21 Jul 2024 04:30) (78/42 - 129/67)  ABP(mean): 78 (21 Jul 2024 04:30) (54 - 94)  RR: 29 (21 Jul 2024 04:30) (18 - 32)  SpO2: 100% (21 Jul 2024 04:30) (97% - 100%)    Physical Exam:   GENERAL: NAD, lying in bed, sedated, intubated  HEAD:  Atraumatic, Normocephalic  EYES: EOMI, PERRLA, conjunctiva and sclera clear  CHEST/LUNG: Breath sounds bilaterally. Unlabored respirations  HEART: Tachycardic, irregular rhythm  ABDOMEN: Bowel sounds present; Soft, Nontender, Slight distension  EXTREMITIES: Bilateral edema of upper and lower extremities  NERVOUS SYSTEM: Unable to assess  SKIN: Fullness in right scapular region, no discoloration visible    I&O's Summary    19 Jul 2024 07:01  -  20 Jul 2024 07:00  --------------------------------------------------------  IN: 2987.1 mL / OUT: 1985 mL / NET: 1002.1 mL    20 Jul 2024 07:01  -  21 Jul 2024 04:54  --------------------------------------------------------  IN: 2815.1 mL / OUT: 992 mL / NET: 1823.1 mL        LABS:                        8.1    14.13 )-----------( 103      ( 21 Jul 2024 00:10 )             24.9     07-21    147<H>  |  120<H>  |  52<H>  ----------------------------<  163<H>  3.6   |  14<L>  |  1.40<H>    Ca    7.8<L>      21 Jul 2024 00:10  Phos  4.2     07-21  Mg     1.7     07-21    TPro  4.2<L>  /  Alb  2.1<L>  /  TBili  0.8  /  DBili  x   /  AST  53<H>  /  ALT  38  /  AlkPhos  184<H>  07-21    PT/INR - ( 21 Jul 2024 00:10 )   PT: 11.5 sec;   INR: 1.10 ratio         PTT - ( 21 Jul 2024 00:10 )  PTT:27.1 sec  ABG - ( 21 Jul 2024 00:00 )  pH, Arterial: 7.33  pH, Blood: x     /  pCO2: 30    /  pO2: 136   / HCO3: 16    / Base Excess: -9.2  /  SaO2: 100.0     Urinalysis Basic - ( 21 Jul 2024 00:10 )    Color: x / Appearance: x / SG: x / pH: x  Gluc: 163 mg/dL / Ketone: x  / Bili: x / Urobili: x   Blood: x / Protein: x / Nitrite: x   Leuk Esterase: x / RBC: x / WBC x   Sq Epi: x / Non Sq Epi: x / Bacteria: x      CARDIAC MARKERS ( 21 Jul 2024 00:10 )  x     / x     / 228 U/L / x     / x      CARDIAC MARKERS ( 20 Jul 2024 00:47 )  x     / x     / 137 U/L / x     / x          CAPILLARY BLOOD GLUCOSE      POCT Blood Glucose.: 167 mg/dL (20 Jul 2024 17:08)  POCT Blood Glucose.: 175 mg/dL (20 Jul 2024 12:17)  POCT Blood Glucose.: 107 mg/dL (20 Jul 2024 05:38)      RADIOLOGY & ADDITIONAL TESTS:  Results Reviewed:   Imaging Personally Reviewed:  Electrocardiogram Personally Reviewed:

## 2024-07-22 NOTE — PROGRESS NOTE ADULT - ATTENDING COMMENTS
seen and examined 07-22-24 @ 1045    sedated on propofol infusion  on phenylephrine @ 3 mcg/kg/min  on PPI BID    intubated on mechanical vent (spont 5 / +5 / 21%)  soft / NT / ND  LLQ scar from ALIF    hgb - 8.1 -> 7.9 -> 7.7 g/dL  serum gastrin - pending      recurrent UGI bleed from duodenal ulcers  -7/9, 7/12, 7/18 - multiple EGDs  -7/9 - empiric GDA embolization  -on 7/20, he got 1u RBC transfusion @ 1815  -if he has significant recurrent UGI bleed requiring multiple RBC transfusion, I would offer emergency duodenotomy to oversew ulcers. seen and examined 07-22-24 @ 1045    sedated on propofol infusion  on phenylephrine @ 3 mcg/kg/min  on PPI BID    intubated on mechanical vent (spont 5 / +5 / 21%)  soft / NT / ND  LLQ scar from ALIF    hgb - 8.1 -> 7.9 -> 7.7 g/dL  serum gastrin - pending      recurrent UGI bleed from duodenal ulcers  -7/9, 7/12, 7/18 - multiple EGDs  -7/9 - empiric GDA embolization  -on 7/20, he got 1u RBC transfusion @ 1815  -if he has significant recurrent UGI bleed requiring multiple RBC transfusion, I would offer emergency duodenotomy to oversew ulcers.  -reconsult acute care surgery PRN

## 2024-07-22 NOTE — PROGRESS NOTE ADULT - ASSESSMENT
77 yo male with PMH of CAD s/p PCI x3 with most recent stent 6/12/24 on Plavix, chronic Afib on eliquis, orthostatic hypotension on midodrine, PAD, neuroendocrine tumor with RT currently on hold, recent admission for dx cath on 6/24/24 who presented for hypotension, admitted for GIB and hemorrhagic shock. Found to be bleeding from duodenum. Transferred to MICU, on pressors.     1. Pancreatic NET- metastatic   -- was on lanreotide then had POD in liver and started on peptide receptor radiotherapy (PRRT)- typically given every 8 weeks for 4 doses. He received one dose on 10/20/2023 and planned to get his 2nd dose at the end of December however his course was complicated by multiple hospitalizations that were noncancer related (decompensated heart failure).   -- 5/28/24 PET Dotatate (compared to 9/2023): several new somatostatin avid osseous lesions, some faintly sclerotic, suspicious for mets. Increased upper RP nodes, probably metastatic. Decreased intensely tracer avid right supradiaphragmatic and upper abdominal nodes, suspicious for metastasis. Redemonstrated intensely somatostatin avid bilobar hepatic lesions, consistent with metastases again morphologically difficult to delineate.   -- CT reviewed by [facility]: SINCE MAY 8 2024 INCREASED HEPATIC METASTASES, NEWLY SEEN PRIMARY TUMOR IN THE PANCREAS, and active bleeding within the duodenum with associated intraluminal hematoma.   -- chromogranin level is elevated at 2058, but no prior level at [facility] for review   -- f/u with Dr. Sophia Prasad at [facility] after discharge, no plan for treatment inpatient, if clinically improves/stabilizes then can be considered for treatment outpatient    2. Duodenal bleed, Hemorrhagic shock  - Remains in MICU, intubated  - CT w/ bleed in duodenum. GI called, EGD 7/9 -> S/p  IR embolization 7/9, intubated in MICU -> s/p extubation 7/15 -> intubated 7/18  - s/p multiple transfusions, fibrinogen low would recommend Cry for < 100, but coags have improved as are essentially normal now so unlikely, probably was related to liver dysfunction.   - Once infection ruled out, may start octreotide 150 mcg BID  - s/p repeat EGD 7/12 showing duodenal lesions w/clots and oozing, no clear targets for intervention and no active bleeding, purastat applied to all areas of oozing.   - S/p EGD 7/18: duodenal ulcer with active oozing, ulcer with visible vessel. Bleeding treated.   - S/p one unit pRBC 7/20  - Per GI, high risk for rebleed, will continue PPI.   - Repeat CT AP w/ No evidence of GI bleed.  Interval endoscopic versus surgical intervention with metallic streak artifact suggestive of surgical clips in the region of the proximal one third portions of the duodenum. Evaluation of the previously seen hematoma is limited secondary to this artifact. Moderate bilateral pleural effusions and associated compressive atelectasis, right greater than left, overall slightly increased from previous CT. Anasarca.  - Per IR, In the absences of any active extravasation on CTA there's no place to target for an embolization  - Surgery monitoring for further bleeding. If significant recurrence of UGIB, may consider emergent open duodenotomy   - MICU managing antibiotics, on meropenem given fevers. Planning extubation today    3. C. diff diarrhea  - on oral vanco    Hugo Topete PA-C  Hematology/Oncology  [facility]  [phone] (office)

## 2024-07-22 NOTE — AIRWAY REMOVAL NOTE  ADULT & PEDS - ARTIFICAL AIRWAY REMOVAL COMMENTS
Written order for extubation verified. The patient was identified by full name and birth date compared to the identification band. Present during the procedure was TAVO Leahy.
Written order for extubation is verified. Pt was identified by full name birthday compared to the identifcation band. Present during the procedure was Yamini Gallagher (TAVO)

## 2024-07-22 NOTE — PROGRESS NOTE ADULT - ASSESSMENT
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, PAD, and orthostatic hypotension on midodrine presenting for lightheadedness He was admitted for initial complaint of lightheadedness and low bp despite midodrine, now with melena and coffee ground emesis and acute blood loss anemia, found to hemorrhagic shock due to duodenal bleed s/p GDA embolization and rebleed found to have duodenal ulcer with active oozing s/p clip, epi, and nexpowder.     Impression:  #Hemorrhagic Shock  #Acute on chronic blood loss anemia  #Duodenal bleed s/p GDA embolization  #LA Grade B Esophagitis  #C-diff infection  #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 Found to have active bleed in second portion of duodenum, unable to identify source underneath large clot on EGD so underwent empiric embolization of GDA by IR for bleeding control. Hgb started dropping after starting cangrelor with worsening renal and liver failure. Repeat EGD 7/12 showed grade D esophagitis, organized clots with extensive duodenal ulcers (some with pigmented spots) w/o active bleeding, treated with purastat. Rebled 7/18 with Hgb drop two points in 9 hours s/p repeat EGD showing duodenal ulcer with active oozing and another ulcer with visible vessel. 3 Clips and epi applied to oozing ulcer with hemostasis. Nexpowder applied to both ulcers for further hemostasis. 1u pRBC after last EGD but otherwise, HDS.     Recommendations:  - Continue PPI IV BID  - Hold plavix and eliquis if able  - If rebleeds, will require surgical intervention  - Trend cbc and coags, maintain active T&S, transfuse Hgb>8  - Vancomycin PO for c-diff infection  - Respiratory and circulatory support per ICU team  - Appreciate palliative care involvement given guarded prognosis  - Will need PPI BID for 8 weeks for grade B esophagitis and PUD    All recommendations are tentative until note is attested by attending.     Dieter Pastor, PGY-4  Gastroenterology & Hepatology Fellow  Available on TEAMS  Long range pager #: [phone]  Short range pager#: [phone]    For non-urgent consults, please send email to [email or web address] (for [facility]) or [email or web address] (for [facility])   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, PAD, and orthostatic hypotension on midodrine presenting for lightheadedness He was admitted for initial complaint of lightheadedness and low bp despite midodrine, now with melena and coffee ground emesis and acute blood loss anemia, found to hemorrhagic shock due to duodenal bleed s/p GDA embolization and rebleed found to have duodenal ulcer with active oozing s/p clip, epi, and nexpowder.     Impression:  #Hemorrhagic Shock  #Acute on chronic blood loss anemia  #Duodenal bleed s/p GDA embolization  #LA Grade B Esophagitis  #C-diff infection  #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 Found to have active bleed in second portion of duodenum, unable to identify source underneath large clot on EGD so underwent empiric embolization of GDA by IR for bleeding control. Hgb started dropping after starting cangrelor with worsening renal and liver failure. Repeat EGD 7/12 showed grade D esophagitis, organized clots with extensive duodenal ulcers (some with pigmented spots) w/o active bleeding, treated with purastat. Rebled 7/18 with Hgb drop two points in 9 hours s/p repeat EGD showing duodenal ulcer with active oozing and another ulcer with visible vessel. 3 Clips and epi applied to oozing ulcer with hemostasis. Nexpowder applied to both ulcers for further hemostasis. 1u pRBC after last EGD but otherwise, HDS.     Recommendations:  - Continue PPI IV BID  - decision to resume an antiplatelet agent will be risk vs benefit decision given extensive duodenal ulcers but with recent stent. Reasonable to start ASA 81mg given hgb stable.   - If rebleeds, will require surgical intervention  - Trend cbc and coags, maintain active T&S, transfuse Hgb>8  - Vancomycin PO for c-diff infection  - Appreciate palliative care involvement given guarded prognosis  - Will need PPI BID for 8 weeks for grade B esophagitis and PUD  - will sign off at this time, however please call back with questions or should any worsening/persistent issues arise.  Please provide patient with Gastroenterology Clinic to confirm appointment; [phone] ([facility] at [address] [facility]) or [phone] ([facility] at [address] [place] [address]) or [phone] ([facility] at [address] [facility]).    All recommendations are tentative until note is attested by attending.     Dieter Pastor, PGY-4  Gastroenterology & Hepatology Fellow  Available on TEAMS  Long range pager #: [phone]  Short range pager#: [phone]    For non-urgent consults, please send email to [email or web address] (for [facility]) or [email or web address] (for [facility])

## 2024-07-22 NOTE — AIRWAY REMOVAL NOTE  ADULT & PEDS - RESPIRATORY EXPANSION/ACCESSORY MUSCLES/RETRACTIONS
no use of accessory muscles/no retractions
suprasternal retractions/no use of accessory muscles/no retractions/expansion symmetric

## 2024-07-22 NOTE — CHART NOTE - NSCHARTNOTEFT_GEN_A_CORE
: Pb Ureña, PGY-3    INDICATION: Shock    PROCEDURE:  [X] LIMITED ECHO  [X] LIMITED CHEST  [ ] LIMITED RETROPERITONEAL  [ ] LIMITED ABDOMINAL  [ ] LIMITED DVT  [ ] NEEDLE GUIDANCE VASCULAR  [ ] NEEDLE GUIDANCE THORACENTESIS  [ ] NEEDLE GUIDANCE PARACENTESIS  [ ] NEEDLE GUIDANCE PERICARDIOCENTESIS  [ ] OTHER    FINDINGS:  Lungs: Plueral effusions in R base, with B Lines  Cardiac: Possibly dilated LA, LVOT VTI 15    INTERPRETATION:  Edema in lungs    Images stored on Qpath. : Pb Ureña, PGY-3    INDICATION: Shock    PROCEDURE:  [X] LIMITED ECHO  [X] LIMITED CHEST  [ ] LIMITED RETROPERITONEAL  [ ] LIMITED ABDOMINAL  [ ] LIMITED DVT  [ ] NEEDLE GUIDANCE VASCULAR  [ ] NEEDLE GUIDANCE THORACENTESIS  [ ] NEEDLE GUIDANCE PARACENTESIS  [ ] NEEDLE GUIDANCE PERICARDIOCENTESIS  [ ] OTHER    FINDINGS:  Lungs: Plueral effusions in R base; B lines bilaterally in anterior fields b/l  Cardiac: LV > RV    INTERPRETATION:  Shock likely distributive     Images stored on [employer]. : Pb Ureña, PGY-3    INDICATION: Shock    PROCEDURE:  [X] LIMITED ECHO  [X] LIMITED CHEST  [ ] LIMITED RETROPERITONEAL  [ ] LIMITED ABDOMINAL  [ ] LIMITED DVT  [ ] NEEDLE GUIDANCE VASCULAR  [ ] NEEDLE GUIDANCE THORACENTESIS  [ ] NEEDLE GUIDANCE PARACENTESIS  [ ] NEEDLE GUIDANCE PERICARDIOCENTESIS  [ ] OTHER    FINDINGS:  Lungs: Plueral effusions in R base; B lines bilaterally in anterior fields b/l  Cardiac: LV > RV; Preserved LVSF    INTERPRETATION:  Shock likely distributive     Images stored on [employer]. : Pb Ureña, PGY-3    INDICATION: Shock    PROCEDURE:  [X] LIMITED ECHO  [X] LIMITED CHEST  [ ] LIMITED RETROPERITONEAL  [ ] LIMITED ABDOMINAL  [ ] LIMITED DVT  [ ] NEEDLE GUIDANCE VASCULAR  [ ] NEEDLE GUIDANCE THORACENTESIS  [ ] NEEDLE GUIDANCE PARACENTESIS  [ ] NEEDLE GUIDANCE PERICARDIOCENTESIS  [ ] OTHER    FINDINGS:  Lungs: Small right pleural effusion, bilateral anterior B lines  Cardiac: LV > RV; Normal LVSF  LVOT VTI 14.4-15.9 cm   IVC 2.5 cm    INTERPRETATION:  Shock likely distributive  Plethoric IVC, pleural effusion, B line predominance consistent with volume overload state     Images stored on Qpath.

## 2024-07-22 NOTE — PROGRESS NOTE ADULT - ASSESSMENT
79 yo male with PMH of CAD s/p PCI x3 with most recent stent 6/12/24 on Plavix, chronic Afib on eliquis, orthostatic hypotension on midodrine, PAD, 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 Patient states since discharge on this past Saturday, he continued to feel ongoing generalized weakness and dizziness with positional changes despite compliance with home midodrine 15mg TID and holding torsemide as instructed as of day PTA . Admits to poor PO intake of fluids/solute due to ongoing issues with poor appetite and nausea with meals which is not new. In the ED, vitals notable for SBP 92-11s. Labs notable for elevated BUN/Cr 31/1.59 (from 30/1.18 on day of discharge 6/29/24). CXR with clear lungs. Abd US with innumerable intrahepatic echogenic masses consistent with known metastatic neuroendocrine tumor. Admitted to medicine for symptomatic hypotension and RAZIA.      1- CKD III    2-  hypotension   3- hx chf   4- pericarditis   5- C Diff   6- acidosis       RAZIA in setting of hypotension, anemia -> GIB, hemorrhagic shock leading to oliguric ATN  received CT IV contrast, and emergently went to IR for mesenteric embolization 7/9  non oliguric   bp support needed with  midodrine 30 mg tid  and neosynephrine drip  stress dose steroids   amio per icu team  IV alb  as needed   vanco 125 mg q 6 enteral   cont  sodium bicarb 1950  mg tid  may need bicarb drip   hypernatremia trend na holding off on adding free water   strict I/O  monitor creatinine closely and lytes

## 2024-07-22 NOTE — PROGRESS NOTE ADULT - SUBJECTIVE AND OBJECTIVE BOX
[facility]   [phone]  RENAL FOLLOW UP NOTE  --------------------------------------------------------------------------------  Chief Complaint:    24 hour events/subjective:    seen earlier   intubated    PAST HISTORY  --------------------------------------------------------------------------------  No significant changes to PMH, PSH, FHx, SHx, unless otherwise noted    ALLERGIES & MEDICATIONS  --------------------------------------------------------------------------------  Allergies    No Known Allergies    Intolerances      Standing Inpatient Medications  aMIOdarone    Tablet   Oral   aMIOdarone    Tablet 200 milliGRAM(s) Oral daily  aspirin  chewable 81 milliGRAM(s) Oral daily  atorvastatin 80 milliGRAM(s) Oral at bedtime  chlorhexidine 2% Cloths 1 Application(s) Topical <User Schedule>  doxazosin 2 milliGRAM(s) Oral at bedtime  folic acid Injectable 1 milliGRAM(s) IV Push daily  hydrocortisone sodium succinate Injectable 50 milliGRAM(s) IV Push every 6 hours  insulin lispro (ADMELOG) corrective regimen sliding scale   SubCutaneous every 6 hours  meropenem  IVPB      meropenem  IVPB 1000 milliGRAM(s) IV Intermittent every 8 hours  midodrine 30 milliGRAM(s) Oral every 8 hours  mupirocin 2% Nasal 1 Application(s) Both Nostrils two times a day  pantoprazole  Injectable 40 milliGRAM(s) IV Push every 12 hours  phenylephrine    Infusion 0.1 MICROgram(s)/kG/Min IV Continuous <Continuous>  potassium chloride  10 mEq/50 mL IVPB 10 milliEquivalent(s) IV Intermittent once  sodium bicarbonate 1950 milliGRAM(s) Oral three times a day  thiamine Injectable 100 milliGRAM(s) IV Push daily  vancomycin    Solution 125 milliGRAM(s) Oral every 6 hours    PRN Inpatient Medications      REVIEW OF SYSTEMS  --------------------------------------------------------------------------------        VITALS/PHYSICAL EXAM  --------------------------------------------------------------------------------  T(C): 37 (07-22-24 @ 20:00), Max: 37 (07-22-24 @ 20:00)  HR: 135 (07-22-24 @ 20:30) (120 - 145)  BP: --  RR: 16 (07-22-24 @ 20:30) (14 - 43)  SpO2: 98% (07-22-24 @ 20:30) (77% - 100%)  Wt(kg): --    Weight (kg): 125.6 (07-21-24 @ 00:00)      07-21-24 @ 07:01  -  07-22-24 @ 07:00  --------------------------------------------------------  IN: 3496.3 mL / OUT: 1070 mL / NET: 2426.3 mL    07-22-24 @ 07:01  -  07-22-24 @ 20:56  --------------------------------------------------------  IN: 1392.4 mL / OUT: 1975 mL / NET: -582.6 mL      Physical Exam:  	    	  Gen:  intubated   	Pulm: decrease bs  no rales  +  ronchi  -  wheezing  	CV: No JVD. RRR, S1S2; no rub  	Abd: +BS,  + ascites and distended  	: No bladder distention   	UE: Warm, no cyanosis  no clubbing,  no edema  	LE: Warm, no cyanosis  no clubbing,  4+  edema    LABS/STUDIES  --------------------------------------------------------------------------------              7.7    12.10 >-----------<  117      [07-22-24 @ 11:05]              23.2     151  |  119  |  45  ----------------------------<  143      [07-22-24 @ 15:01]  3.3   |  17  |  1.27        Ca     8.0     [07-22-24 @ 15:01]      Mg     2.2     [07-22-24 @ 15:01]      Phos  3.7     [07-22-24 @ 15:01]    TPro  4.6  /  Alb  2.3  /  TBili  0.6  /  DBili  x   /  AST  74  /  ALT  42  /  AlkPhos  240  [07-22-24 @ 15:01]    PT/INR: PT 10.8 , INR 0.98       [07-21-24 @ 23:56]  PTT: 26.9       [07-21-24 @ 23:56]          [07-21-24 @ 23:56]    Creatinine Trend:  SCr 1.27 [07-22 @ 15:01]  SCr 1.25 [07-21 @ 23:56]  SCr 1.40 [07-21 @ 00:10]  SCr 1.41 [07-20 @ 12:57]  SCr 1.43 [07-20 @ 00:47]          TSH 2.47      [07-04-24 @ 12:31]

## 2024-07-22 NOTE — PROGRESS NOTE ADULT - SUBJECTIVE AND OBJECTIVE BOX
Patient is a 78y old  Male who presents with a chief complaint of hypotension (22 Jul 2024 11:22)    Patient seen and examined at bedside, remains intubated in the MICU. Awake.     MEDICATIONS  (STANDING):  aMIOdarone    Tablet   Oral   aMIOdarone    Tablet 200 milliGRAM(s) Oral daily  atorvastatin 80 milliGRAM(s) Oral at bedtime  chlorhexidine 0.12% Liquid 15 milliLiter(s) Oral Mucosa every 12 hours  chlorhexidine 2% Cloths 1 Application(s) Topical <User Schedule>  dexMEDEtomidine Infusion 0.1 MICROgram(s)/kG/Hr (2.47 mL/Hr) IV Continuous <Continuous>  digoxin  IVPB 250 MICROGram(s) IV Intermittent every 6 hours  doxazosin 2 milliGRAM(s) Oral at bedtime  folic acid Injectable 1 milliGRAM(s) IV Push daily  hydrocortisone sodium succinate Injectable 50 milliGRAM(s) IV Push every 6 hours  insulin lispro (ADMELOG) corrective regimen sliding scale   SubCutaneous every 6 hours  meropenem  IVPB      meropenem  IVPB 1000 milliGRAM(s) IV Intermittent every 8 hours  midodrine 30 milliGRAM(s) Oral every 8 hours  mupirocin 2% Nasal 1 Application(s) Both Nostrils two times a day  pantoprazole  Injectable 40 milliGRAM(s) IV Push every 12 hours  phenylephrine    Infusion 0.1 MICROgram(s)/kG/Min (1.85 mL/Hr) IV Continuous <Continuous>  propofol Infusion 20 MICROgram(s)/kG/Min (11.8 mL/Hr) IV Continuous <Continuous>  sodium bicarbonate 1950 milliGRAM(s) Oral three times a day  thiamine Injectable 100 milliGRAM(s) IV Push daily  vancomycin    Solution 125 milliGRAM(s) Oral every 6 hours    MEDICATIONS  (PRN):    Vital Signs Last 24 Hrs  T(C): 36.8 (22 Jul 2024 12:00), Max: 36.8 (21 Jul 2024 20:00)  T(F): 98.2 (22 Jul 2024 12:00), Max: 98.2 (21 Jul 2024 20:00)  HR: 128 (22 Jul 2024 12:00) (125 - 143)  BP: --  BP(mean): --  RR: 31 (22 Jul 2024 12:00) (15 - 43)  SpO2: 100% (22 Jul 2024 12:00) (94% - 100%)    Parameters below as of 21 Jul 2024 20:00  Patient On (Oxygen Delivery Method): ventilator    PE  NAD  Awake, intubated  Anicteric, MMM  No c/c/e  No rash grossly                          7.7    12.10 )-----------( 117      ( 22 Jul 2024 11:05 )             23.2     07-21    148<H>  |  120<H>  |  46<H>  ----------------------------<  140<H>  3.2<L>   |  16<L>  |  1.25    Ca    7.9<L>      21 Jul 2024 23:56  Phos  3.9     07-21  Mg     1.6     07-21    TPro  4.4<L>  /  Alb  2.1<L>  /  TBili  0.6  /  DBili  x   /  AST  66<H>  /  ALT  41  /  AlkPhos  222<H>  07-21

## 2024-07-22 NOTE — PHARMACOTHERAPY INTERVENTION NOTE - COMMENTS
NANDO HERNANDEZ, 78y Male     Creatinine: 1.25 mg/dL (07-21-24 @ 23:56)  Creatinine: 1.40 mg/dL (07-21-24 @ 00:10)    Weight (kg): 125.6 (07-21-24 @ 00:00)     MEDICATIONS  (STANDING):  aMIOdarone    Tablet   Oral   aMIOdarone    Tablet 200 milliGRAM(s) Oral daily  atorvastatin 80 milliGRAM(s) Oral at bedtime  chlorhexidine 0.12% Liquid 15 milliLiter(s) Oral Mucosa every 12 hours  chlorhexidine 2% Cloths 1 Application(s) Topical <User Schedule>  dexMEDEtomidine Infusion 0.1 MICROgram(s)/kG/Hr (2.47 mL/Hr) IV Continuous <Continuous>  digoxin  IVPB 500 MICROGram(s) IV Intermittent once  digoxin  IVPB 250 MICROGram(s) IV Intermittent every 6 hours  doxazosin 2 milliGRAM(s) Oral at bedtime  folic acid Injectable 1 milliGRAM(s) IV Push daily  furosemide   Injectable 40 milliGRAM(s) IV Push once  hydrocortisone sodium succinate Injectable 50 milliGRAM(s) IV Push every 6 hours  insulin lispro (ADMELOG) corrective regimen sliding scale   SubCutaneous every 6 hours  magnesium sulfate  IVPB 4 Gram(s) IV Intermittent once  meropenem  IVPB 1000 milliGRAM(s) IV Intermittent every 8 hours  meropenem  IVPB      metolazone 5 milliGRAM(s) Oral once  midodrine 30 milliGRAM(s) Oral every 8 hours  mupirocin 2% Nasal 1 Application(s) Both Nostrils two times a day  pantoprazole  Injectable 40 milliGRAM(s) IV Push every 12 hours  phenylephrine    Infusion 0.1 MICROgram(s)/kG/Min (1.85 mL/Hr) IV Continuous <Continuous>  potassium chloride   Powder 40 milliEquivalent(s) Oral once  propofol Infusion 20 MICROgram(s)/kG/Min (11.8 mL/Hr) IV Continuous <Continuous>  sodium bicarbonate 1950 milliGRAM(s) Oral three times a day  thiamine Injectable 100 milliGRAM(s) IV Push daily  vancomycin    Solution 125 milliGRAM(s) Oral every 6 hours    Plan for today  -Continue meropenem for 7 days (pseudomonas pneumonia; R-pip/tazo, R-cefepime); vancomycin PO will treat for 10 days (7/14 to 7/23) then will do ppx dose  -Give furosemide, metolazone  -Digoxin load    Recommendation(s):  1) Loading dose for digoxin: 0.5mg x1, then 0.25mg q6h    Aielen Navarro, PharmD, BCCCP  Clinical Pharmacist, Critical Care  Available via [employer] Teams       NANDO HERNANDEZ, 78y Male     Creatinine: 1.25 mg/dL (07-21-24 @ 23:56)  Creatinine: 1.40 mg/dL (07-21-24 @ 00:10)    Weight (kg): 125.6 (07-21-24 @ 00:00)     MEDICATIONS  (STANDING):  aMIOdarone    Tablet   Oral   aMIOdarone    Tablet 200 milliGRAM(s) Oral daily  atorvastatin 80 milliGRAM(s) Oral at bedtime  chlorhexidine 0.12% Liquid 15 milliLiter(s) Oral Mucosa every 12 hours  chlorhexidine 2% Cloths 1 Application(s) Topical <User Schedule>  dexMEDEtomidine Infusion 0.1 MICROgram(s)/kG/Hr (2.47 mL/Hr) IV Continuous <Continuous>  digoxin  IVPB 500 MICROGram(s) IV Intermittent once  digoxin  IVPB 250 MICROGram(s) IV Intermittent every 6 hours  doxazosin 2 milliGRAM(s) Oral at bedtime  folic acid Injectable 1 milliGRAM(s) IV Push daily  furosemide   Injectable 40 milliGRAM(s) IV Push once  hydrocortisone sodium succinate Injectable 50 milliGRAM(s) IV Push every 6 hours  insulin lispro (ADMELOG) corrective regimen sliding scale   SubCutaneous every 6 hours  magnesium sulfate  IVPB 4 Gram(s) IV Intermittent once  meropenem  IVPB 1000 milliGRAM(s) IV Intermittent every 8 hours  meropenem  IVPB      metolazone 5 milliGRAM(s) Oral once  midodrine 30 milliGRAM(s) Oral every 8 hours  mupirocin 2% Nasal 1 Application(s) Both Nostrils two times a day  pantoprazole  Injectable 40 milliGRAM(s) IV Push every 12 hours  phenylephrine    Infusion 0.1 MICROgram(s)/kG/Min (1.85 mL/Hr) IV Continuous <Continuous>  potassium chloride   Powder 40 milliEquivalent(s) Oral once  propofol Infusion 20 MICROgram(s)/kG/Min (11.8 mL/Hr) IV Continuous <Continuous>  sodium bicarbonate 1950 milliGRAM(s) Oral three times a day  thiamine Injectable 100 milliGRAM(s) IV Push daily  vancomycin    Solution 125 milliGRAM(s) Oral every 6 hours    Plan for today  -Continue meropenem for 7 days (pseudomonas pneumonia; R-pip/tazo, R-cefepime); vancomycin PO will treat for 10 days (7/14 to 7/23) then will do ppx dose  -Give furosemide, metolazone  -Digoxin load    Recommendation(s):  1) Loading dose for digoxin: 0.5mg x1, then 0.25mg x1 in 6 hours    Aileen Navarro, PharmD, BCCCP  Clinical Pharmacist, Critical Care  Available via [employer] Teams

## 2024-07-22 NOTE — PROGRESS NOTE ADULT - ASSESSMENT
Assessment	  Assessment: 77 yo male with metastatic neuroendocrine pancreatic cancer (tx on hold) and PMH of CAD s/p PCI x3 with most recent stent 6/12/24 on Plavix and eliquis , chronic Afib on eliquis, orthostatic hypotension on midodrine, PAD , recent admission for dx cath on 6/24/24 presented from PCP's office for hypotension despite taking AM midodrine dose on 7/2, admitted to medicine for symptomatic hypotension and RAZIA. Per chart, on 7/8 PM, pt was noted to have acute onset of melena x5 and coffee ground emesis x2-3. RRT was called on 7/9 AM for hypotension, hgb dropped from 9.2 to 7.4 (admission baseline 10-11), FOBT +, pt was started on PPI IV and transfused 1 unit of pRBC. GI was consulted and underwent EGD on 7/9 afternoon. MICU was consulted for uncontrolled bleeding during EGD. During EGD, pt became hypotensive and was given phenylephrine, had A-fib with RVR s/p amiodarone. Now s/p IR GDA embolization, admitted to MICU for further moniring i.s.o hemorrhagic shock 2/2 GI bleed. On repeat EGD, oozing but no active bleeding was noted. Hospital course c/b c.diff, and patient has been placed on vancomycin. Patient had melanotic stool 7/18 with Hgb drop 10.1 to 7.1 and received 2 units pRBC and 1 unit platelets. Patient was reintubated and underwent EGD 7/18 afternoon. GI found duodenal bleed. Patient now s/p 3x clips and epi. Since 7/18 patient has continued to have some melanotic stools with hemoglobin drops concerning for slow rebleeding.     Plan:     NEUROLOGIC  # Baseline AOx3  Course:  - intubated 7/9  - weaned off propofol onto precedex  - extubated 7/15  - reintubated 7/18 for EGD  Plan:  - Patient currently on propofol  - fentanyl prn for pain control (last dose 7/12/24)    CARDIOVASCULAR  # hemorrhagic shock   Course:  - Per spouse, pt's baseline BP is a little bit over 100  - 7/9: RRT called 7/9 for hypotension; 2/2 to Upper GI bleeds, urgently took to EGD, found bleeding ulcer that was not well controlled, underwent IR empiric embolization  - 7/13: Restarted plavix for new stent (6/12/24) per GI; on henny, maintain MAP > 65; took off vaso and precedex, he got tachy to 118. Added precedex back.   - 7/14 vasopressin added  - 7/16 Albumin 5%  mL given  - 7/17 Vasopressin stopped  - s/p 5 pRBC prior to MICU, and 4 pRBC, 2 FFP, 2 platelets in MICU (7/17)  - 7/18, patient had large melanotic stool with Hgb drop from 10.1-7.1, patient was restarted on henny, vaso, and levo for reintubation for GI scope; s/p 2pRBC, 1 platelet  - D/c droxidopa 200mg q8 given pressor requirements  - 7/20 1 pRBC  - Total transfusions: 12pRBC, 3 platelet  Findings:  - AM cortisol 17 (7/5), AM cortisol 37.1 (7/11)  - POCUS ECHO (7/16) showed no cardiogenic shock, no tamponade, low SV indeterminate IVC, irregular B lines but not concerning for pulmonary edema, and some subdiaphragmatic fluid  Plan:  - Continue to monitor CBC  - Transfuse for Hgb <8  - Continue henny, levo, and vaso, try to wean (goal MAP >65)  - Continue to hold Plavix 75 mg  - IVF if not volume overloaded given recent stool patterns  - Continue midodrine 30mg q8  - Start hydrocortisone 50mg q6 for refractory shock    #CAD s/p PCI x3, most recent stent 6/12/24, NURIA to pLAD  Course:  - restarted plavix 7/13/24 for new stent (6/12/24) per GI   Plan:  - Continue to hold Plavix 75 mg  - Continue atorvastatin 80 mg daily    #PAD  # A-fib on eliquis  May have been worsened because of GI bleed  - s/p successful DCCV 10/31   - 7/16 In RVR, 2 doses of amio overnight; Cards recommended resuming home dose sotalol 40 mg PO (qTC wnl), s/p 1 dose sotalol but RVR persisted  - 7/16 Amio restarted amio 150 mg followed by loading dose of 1 mg/min for 6 hours and 0.5 mg/min for 18 hours  - 7/17 received amio 150 overnight  Plan:  - Hold eliquis   - Continue amio 400 mg q8 for 12 doses followed by 200 mg daily (first 400mg dose 1300 on 7/17)  - Hold home sotalol  - Cardiology following    PULMONARY  #Intubated for airway protection prior to EGD/IR embolization  Course:  - Intubated 7/9  - Extubated 7/15  - Intubated again 7/18  Plan:  - Continue to monitor overall clinical status in considering extubation as patient was not intubated for pulmonary reasons    #Sputum production (NA)  - Patient complaining of cough and phlegm  - Patient able to self-suction (7/17)  Plan:  - Currently intubated  - Airway clearance protocol    RENAL/  #RAZIA   #ATN i.s.o hypotension  #metabolic acidosis   Findings:  - pH 7.37 (7/16)  - Cr 1.53 (7/16)  - AG 18 (7/17) with high glucose, concern for DKA but ABG pH 7.39, pCO2 26 pO2 87, pHCO3 16, Beta hydroxybutyrate 0 (7/17)  - AG 13 (7/18)  - ABG pH 7.33, pCO2 30, pO2 136, HCO3 16 (7/21)  Plan:  - f/u nephro recs  - Increase sodium bicarb 1950 mg tid, per nephro  - Per nephro, patient is not a dialysis candidate  - trend BUN/Cr   - monitor Is and Os   - Bumex 2mg IV discontinued    #Urinary retention  Plan:  - Started Doxazosin 2 mg daily (7/15)  - Monitor I/Os  - Ivory placed by urology 7/19, making urine    GASTROINTESTINAL  #Upper GI Bleed  Course:  - 7/9 CT A/P - Active bleeding in the third portion of the duodenum. Progression of liver metastases.  - 7/9 EGD showed esophagitis, One non-bleeding duodenal ulcer with a clean ulcer base (Rajun Class III). One oozing duodenal ulcer with spurting hemorrhage (Arjun Class Ia). Treatment not successful. Treated with bipolar cautery.  - 7/9 s/p IR GDA embolization  - 7/12 s/p 5 units pRBC prior to MICU and then 4:2:2  - 7/18 Patient had melanotic stool overnight and hemoglobin drop 10.1 to 7.1 in 24 hours; Reached out to surgery in case of recurrent bleeding per GI if unable to find/contain source during re-scope  - 7/18 EGD showing duodenal bleed s/p 3 clips + epi  - Total EGD: 3  - 7/19 Bloody BM likely old blood  - Patient continues to have melatonic stools with Hgb drop  - 7/20 CTA IMPRESSION: "No evidence of GI bleed. Interval endoscopic versus surgical intervention with metallic streak artifact suggestive of surgical clips in the region of the proximal one third portions of the duodenum. Evaluation of the previously seen hematoma is limited secondary to this artifact. Moderate bilateral pleural effusions and associated compressive atelectasis, right greater than left, overall slightly increased from previous CT. Anasarca."  Plan:  - per GI recs, changed PPI gtt to PPI IV BID  - monitor Hgb, transfuse as needed for Hgb <8  - hold eliquis   - continue to f/u with GI and IR     - Per GI, "Will need PPI BID for 8 weeks for grade D esophagitis and PUD"    - Per GI, high risk of 30 day rebleed (>10-20% risk)  - No acute IR intervention per IR because no active area of extravasation on imaging  - No acute surgical intervention per surgery  - No acute GI intervention per GI  - Gastrin level per surgery    #Nutrition  Course:  - placed OG tube per GI and started ensure clear liquid (see note from RD 7/13/24)  - OG tube removed during extubation (7/15/24)  -dysphagia screen failed  - FEES (7/17) failed  - ENT consulted for vallecular lesion, diagnosed vallecular cyst, no inpatient intervention required, outpatient f/u  Plan:  - OG tube in place  - Diet per RD recs (placed 7/17)  - Speech and swallow to reevaluate overall status improves    #Diarrhea  Course:  - stopped maintenance fluids   - positive for c. diff (7/14)  - started vancomycin (7/14 -)   - having worsening diarrhea, no melena (7/16)  Plan:  - Continue vancomycin  - Fluids as necessary to compensate for volume loss 2/2 diarrhea    #Transaminitis (resolving)  Findings:  - Bili 1.1 (7/16) -> 1.3 (7/17)  - Alk P 359 (7/16)-> 484 (7/17)  -  (7/16)->129 (7/17)  - ALT 67 (7/16)-> 72 (7/17)  - RUQ US (7/17): known liver mets, no acute findings  Plan:  - Restart atorvastatin 80 mg daily    INFECTIOUS DISEASE  #leukocytosis  Findings/Course:  - 7/10 BCx - NGTD  - Sputum Cx from ETT 7/13 showed numerous gram neg krishna (Klebsiella oxytoca/raoultella ornithinolytica)  - Started zosyn for empiric treatment (7/11 - 7/18)   - GI panel: previously indeterminate norovirus (7/8), negative (7/13)     - per ID- no intervention needed regarding the PCR results  - c. diff + (7/14)  - started vancomycin for c. diff (7/14- )  - febrile 7/16 overnight  - currently afebrile 7/17  - MRSA Swab: negative  - Staph aureus PCR positive  - Culture resistant to Zosyn (7/18)  - Switched Zosyn to Meropenem (7/18- )  - Patient febrile overnight (7/19)  Plan:  - Continue vancomycin 125 mg q6 for c. diff  - Switched to meropenem 1000 mg q8  - Re-sent cultures  - If persistent fevers, consider escalating vancomycin to 250mg q6 or adding flagyl  - Continue to monitor for fevers    ENDOCRINE  #adrenal insufficiency   Findings:  -7/5 cortisol 17   - 7/11 cortisol 37.1   Plan:  - Start hydrocortisone 50mg q6  - Monitor sugars with steroid    HEMATOLOGY/ONCOLOGY   # neuroendocrine tumor   - was on lanreotide then had POD in liver and started on peptide receptor radiotherapy (PRRT)- typically given every 8 weeks for 4 doses. He last received it 10/20/2023   - PET 5/28/24 shows new somatostatin avid osseous lesions; decreased intensely avid right supradiaphragmatic and upper abdominal nodes, suspicious for mets  Plan:  - per heme/onc:    - can resume octreotide 150 mcg bid once infection r/o      - can check factor levels V, VIII, X     - "-- f/u with Dr. Sophia Prasad at [facility] [place], [place] after discharge, no plan for treatment inpatient, if clinically improves/stabilizes then can be considered for treatment outpatient"    #Normocytic anemia   - Pt HgB dropped from 9.0 -> 8.1  - ISO of GI bleed hx and continued melanotic BMs, there is c/o of rebleed   Plan:  - Surgery and GI made aware & are following     #DVT ppx  - SCDs  - LE duplex (7/17): negative for DVT    SKINS:   - Lines/Tubes - PIV, cordis replaced with central line (7/14), A line (7/20)    Ethics:   - Full Code   - GOC 7/12, spoke to spouse (Yamilet) over phone with palliative care team, discussed GOC again on 7/16  - Reach back out to palliative tomorrow given hospital course    Consults this admission: GI, Heme Onc, Palliative, Endocrinology, Cardiology, Nephrology   Assessment	  Assessment: 79 yo male with metastatic neuroendocrine pancreatic cancer (tx on hold) and PMH of CAD s/p PCI x3 with most recent stent 6/12/24 on Plavix and eliquis , chronic Afib on eliquis, orthostatic hypotension on midodrine, PAD , recent admission for dx cath on 6/24/24 presented from PCP's office for hypotension despite taking AM midodrine dose on 7/2, admitted to medicine for symptomatic hypotension and RAZIA. Per chart, on 7/8 PM, pt was noted to have acute onset of melena x5 and coffee ground emesis x2-3. RRT was called on 7/9 AM for hypotension, hgb dropped from 9.2 to 7.4 (admission baseline 10-11), FOBT +, pt was started on PPI IV and transfused 1 unit of pRBC. GI was consulted and underwent EGD on 7/9 afternoon. MICU was consulted for uncontrolled bleeding during EGD. During EGD, pt became hypotensive and was given phenylephrine, had A-fib with RVR s/p amiodarone. Now s/p IR GDA embolization, admitted to MICU for further moniring i.s.o hemorrhagic shock 2/2 GI bleed. On repeat EGD, oozing but no active bleeding was noted. Hospital course c/b c.diff, and patient has been placed on vancomycin. Patient had melanotic stool 7/18 with Hgb drop 10.1 to 7.1 and received 2 units pRBC and 1 unit platelets. Patient was reintubated and underwent EGD 7/18 afternoon. GI found duodenal bleed. Patient now s/p 3x clips and epi. Since 7/18 patient has continued to have some melanotic stools with hemoglobin drops concerning for slow rebleeding.     Plan:     NEUROLOGIC  # Baseline AOx3  Course:  - intubated 7/9  - weaned off propofol onto precedex  - extubated 7/15  - reintubated 7/18 for EGD  Plan:  - Extubation today  - fentanyl prn for pain control (last dose 7/12/24)    CARDIOVASCULAR  # hemorrhagic shock   Course:  - Per spouse, pt's baseline BP is a little bit over 100  - 7/9: RRT called 7/9 for hypotension; 2/2 to Upper GI bleeds, urgently took to EGD, found bleeding ulcer that was not well controlled, underwent IR empiric embolization  - 7/13: Restarted plavix for new stent (6/12/24) per GI; on henny, maintain MAP > 65; took off vaso and precedex, he got tachy to 118. Added precedex back.   - 7/14 vasopressin added  - 7/16 Albumin 5%  mL given  - 7/17 Vasopressin stopped  - s/p 5 pRBC prior to MICU, and 4 pRBC, 2 FFP, 2 platelets in MICU (7/17)  - 7/18, patient had large melanotic stool with Hgb drop from 10.1-7.1, patient was restarted on henny, vaso, and levo for reintubation for GI scope; s/p 2pRBC, 1 platelet  - D/c droxidopa 200mg q8 given pressor requirements  - 7/20 1 pRBC  - Total transfusions: 12pRBC, 3 platelet  Findings:  - AM cortisol 17 (7/5), AM cortisol 37.1 (7/11)  - POCUS ECHO (7/16) showed no cardiogenic shock, no tamponade, low SV indeterminate IVC, irregular B lines but not concerning for pulmonary edema, and some subdiaphragmatic fluid  Plan:  - Continue to monitor CBC  - Transfuse for Hgb <8, (goal given recent stent)  - Continue henny, try to wean (goal MAP >65)  - Discontinue levo  - Continue to hold Plavix 75 mg  - Continue midodrine 30mg q8  - Continue hydrocortisone 50mg q6 for refractory shock    #CAD s/p PCI x3, most recent stent 6/12/24, NURIA to pLAD  Course:  - restarted plavix 7/13/24 for new stent (6/12/24) per GI   Plan:  - Continue to hold Plavix 75 mg  - Continue atorvastatin 80 mg daily    #PAD  # A-fib on eliquis  May have been worsened because of GI bleed  - s/p successful DCCV 10/31   - 7/16 In RVR, 2 doses of amio overnight; Cards recommended resuming home dose sotalol 40 mg PO (qTC wnl), s/p 1 dose sotalol but RVR persisted  - 7/16 Amio restarted amio 150 mg followed by loading dose of 1 mg/min for 6 hours and 0.5 mg/min for 18 hours  - 7/17 received amio 150 overnight   - 7/17 Amio load started (amio 400 mg q8 for 12 doses)  Plan:  - Hold eliquis   - Continue amio 200 mg daily  - Start digoxin load (500mcg followed by 250mcg q6)  - Hold home sotalol  - Cardiology following    PULMONARY  #Intubated for airway protection prior to EGD/IR embolization  Course:  - Intubated 7/9  - Extubated 7/15  - Intubated again 7/18  Plan:  - Continue to monitor overall clinical status in considering extubation as patient was not intubated for pulmonary reasons    #Sputum production (NA)  - Patient complaining of cough and phlegm  - Patient able to self-suction (7/17)  Plan:  - Currently intubated  - Airway clearance protocol    RENAL/  #RAZIA   #ATN i.s.o hypotension  #metabolic acidosis   Findings:  - pH 7.37 (7/16)  - Cr 1.53 (7/16)  - AG 18 (7/17) with high glucose, concern for DKA but ABG pH 7.39, pCO2 26 pO2 87, pHCO3 16, Beta hydroxybutyrate 0 (7/17)  - AG 13 (7/18)  - ABG pH 7.33, pCO2 30, pO2 136, HCO3 16 (7/21)  Plan:  - f/u nephro recs  - Increase sodium bicarb 1950 mg tid, per nephro  - Per nephro, patient is not a dialysis candidate  - trend BUN/Cr   - monitor Is and Os   - Bumex 2mg IV discontinued    #Urinary retention  Plan:  - Started Doxazosin 2 mg daily (7/15)  - Monitor I/Os  - Ivory placed by urology 7/19, making urine    GASTROINTESTINAL  #Upper GI Bleed  Course:  - 7/9 CT A/P - Active bleeding in the third portion of the duodenum. Progression of liver metastases.  - 7/9 EGD showed esophagitis, One non-bleeding duodenal ulcer with a clean ulcer base (Arjun Class III). One oozing duodenal ulcer with spurting hemorrhage (Arjun Class Ia). Treatment not successful. Treated with bipolar cautery.  - 7/9 s/p IR GDA embolization  - 7/12 s/p 5 units pRBC prior to MICU and then 4:2:2  - 7/18 Patient had melanotic stool overnight and hemoglobin drop 10.1 to 7.1 in 24 hours; Reached out to surgery in case of recurrent bleeding per GI if unable to find/contain source during re-scope  - 7/18 EGD showing duodenal bleed s/p 3 clips + epi  - Total EGD: 3  - 7/19 Bloody BM likely old blood  - Patient continues to have melatonic stools with Hgb drop  - 7/20 CTA IMPRESSION: "No evidence of GI bleed. Interval endoscopic versus surgical intervention with metallic streak artifact suggestive of surgical clips in the region of the proximal one third portions of the duodenum. Evaluation of the previously seen hematoma is limited secondary to this artifact. Moderate bilateral pleural effusions and associated compressive atelectasis, right greater than left, overall slightly increased from previous CT. Anasarca."  Plan:  - per GI recs, changed PPI gtt to PPI IV BID  - monitor Hgb, transfuse as needed for Hgb <8  - hold eliquis   - continue to f/u with GI and IR     - Per GI, "Will need PPI BID for 8 weeks for grade D esophagitis and PUD"    - Per GI, high risk of 30 day rebleed (>10-20% risk)  - No acute IR intervention per IR because no active area of extravasation on imaging  - No acute surgical intervention per surgery  - No acute GI intervention per GI  - Gastrin level per surgery    #Nutrition  Course:  - placed OG tube per GI and started ensure clear liquid (see note from RD 7/13/24)  - OG tube removed during extubation (7/15/24)  -dysphagia screen failed  - FEES (7/17) failed  - ENT consulted for vallecular lesion, diagnosed vallecular cyst, no inpatient intervention required, outpatient f/u  Plan:  - OG tube in place  - Diet per RD recs (placed 7/17)  - Speech and swallow to reevaluate overall status improves    #Diarrhea  Course:  - stopped maintenance fluids   - positive for c. diff (7/14)  - started vancomycin (7/14 -)   - having worsening diarrhea, no melena (7/16)  Plan:  - Continue vancomycin  - Fluids as necessary to compensate for volume loss 2/2 diarrhea    #Transaminitis (resolving)  Findings:  - Bili 1.1 (7/16) -> 1.3 (7/17)  - Alk P 359 (7/16)-> 484 (7/17)  -  (7/16)->129 (7/17)  - ALT 67 (7/16)-> 72 (7/17)  - RUQ US (7/17): known liver mets, no acute findings  Plan:  - Restart atorvastatin 80 mg daily    INFECTIOUS DISEASE  #leukocytosis  Findings/Course:  - 7/10 BCx - NGTD  - Sputum Cx from ETT 7/13 showed numerous gram neg krishna (Klebsiella oxytoca/raoultella ornithinolytica)  - Started zosyn for empiric treatment (7/11 - 7/18)   - GI panel: previously indeterminate norovirus (7/8), negative (7/13)     - per ID- no intervention needed regarding the PCR results  - c. diff + (7/14)  - started vancomycin for c. diff (7/14- )  - febrile 7/16 overnight  - currently afebrile 7/17  - MRSA Swab: negative  - Staph aureus PCR positive  - Culture resistant to Zosyn (7/18)  - Switched Zosyn to Meropenem (7/18- )  - Patient febrile overnight (7/19)  Plan:  - Continue vancomycin 125 mg q6 for c. diff  - Switched to meropenem 1000 mg q8  - Re-sent cultures  - If persistent fevers, consider escalating vancomycin to 250mg q6 or adding flagyl  - Continue to monitor for fevers    ENDOCRINE  #adrenal insufficiency   Findings:  -7/5 cortisol 17   - 7/11 cortisol 37.1   Plan:  - Start hydrocortisone 50mg q6  - Monitor sugars with steroid    HEMATOLOGY/ONCOLOGY   # neuroendocrine tumor   - was on lanreotide then had POD in liver and started on peptide receptor radiotherapy (PRRT)- typically given every 8 weeks for 4 doses. He last received it 10/20/2023   - PET 5/28/24 shows new somatostatin avid osseous lesions; decreased intensely avid right supradiaphragmatic and upper abdominal nodes, suspicious for mets  Plan:  - per heme/onc:    - can resume octreotide 150 mcg bid once infection r/o      - can check factor levels V, VIII, X     - "-- f/u with Dr. Sophia Prasad at [facility] [place] after discharge, no plan for treatment inpatient, if clinically improves/stabilizes then can be considered for treatment outpatient"    #Normocytic anemia   - Pt HgB dropped from 9.0 -> 8.1  - ISO of GI bleed hx and continued melanotic BMs, there is c/o of rebleed   Plan:  - Surgery and GI made aware & are following     #DVT ppx  - SCDs  - LE duplex (7/17): negative for DVT    SKINS:   - Lines/Tubes - PIV, cordis replaced with central line (7/14), A line (7/20)    Ethics:   - Full Code   - GOC 7/12, spoke to spouse (Yamliet) over phone with palliative care team, discussed GOC again on 7/16  - Reach back out to palliative tomorrow given hospital course    Consults this admission: GI, Heme Onc, Palliative, Endocrinology, Cardiology, Nephrology   Assessment	  Assessment: 79 yo male with metastatic neuroendocrine pancreatic cancer (tx on hold) and PMH of CAD s/p PCI x3 with most recent stent 6/12/24 on Plavix and eliquis , chronic Afib on eliquis, orthostatic hypotension on midodrine, PAD , recent admission for dx cath on 6/24/24 presented from PCP's office for hypotension despite taking AM midodrine dose on 7/2, admitted to medicine for symptomatic hypotension and RAZIA. Per chart, on 7/8 PM, pt was noted to have acute onset of melena x5 and coffee ground emesis x2-3. RRT was called on 7/9 AM for hypotension, hgb dropped from 9.2 to 7.4 (admission baseline 10-11), FOBT +, pt was started on PPI IV and transfused 1 unit of pRBC. GI was consulted and underwent EGD on 7/9 afternoon. MICU was consulted for uncontrolled bleeding during EGD. During EGD, pt became hypotensive and was given phenylephrine, had A-fib with RVR s/p amiodarone. Now s/p IR GDA embolization, admitted to MICU for further moniring i.s.o hemorrhagic shock 2/2 GI bleed. On repeat EGD, oozing but no active bleeding was noted. Hospital course c/b c.diff, and patient has been placed on vancomycin. Patient had melanotic stool 7/18 with Hgb drop 10.1 to 7.1 and received 2 units pRBC and 1 unit platelets. Patient was reintubated and underwent EGD 7/18 afternoon. GI found duodenal bleed. Patient now s/p 3x clips and epi. Since 7/18 patient has continued to have some melanotic stools with hemoglobin drops concerning for slow rebleeding.     Plan:     NEUROLOGIC  # Baseline AOx3  Course:  - intubated 7/9  - weaned off propofol onto precedex  - extubated 7/15  - reintubated 7/18 for EGD  Plan:  - Extubated today  - fentanyl prn for pain control (last dose 7/12/24)    CARDIOVASCULAR  # hemorrhagic shock   Course:  - Per spouse, pt's baseline BP is a little bit over 100  - 7/9: RRT called 7/9 for hypotension; 2/2 to Upper GI bleeds, urgently took to EGD, found bleeding ulcer that was not well controlled, underwent IR empiric embolization  - 7/13: Restarted plavix for new stent (6/12/24) per GI; on henny, maintain MAP > 65; took off vaso and precedex, he got tachy to 118. Added precedex back.   - 7/14 vasopressin added  - 7/16 Albumin 5%  mL given  - 7/17 Vasopressin stopped  - s/p 5 pRBC prior to MICU, and 4 pRBC, 2 FFP, 2 platelets in MICU (7/17)  - 7/18, patient had large melanotic stool with Hgb drop from 10.1-7.1, patient was restarted on henny, vaso, and levo for reintubation for GI scope; s/p 2pRBC, 1 platelet  - D/c droxidopa 200mg q8 given pressor requirements  - 7/20 1 pRBC  - Total transfusions: 12pRBC, 3 platelet  Findings:  - AM cortisol 17 (7/5), AM cortisol 37.1 (7/11)  - POCUS ECHO (7/16) showed no cardiogenic shock, no tamponade, low SV indeterminate IVC, irregular B lines but not concerning for pulmonary edema, and some subdiaphragmatic fluid  Plan:  - Continue to monitor CBC  - Transfuse for Hgb <8, (goal given recent stent)  - Continue henny, try to wean (goal MAP >65)  - Discontinued levo  - Continue to hold Plavix 75 mg  - Continue midodrine 30mg q8  - Continue hydrocortisone 50mg q6 for refractory shock    #CAD s/p PCI x3, most recent stent 6/12/24, NURIA to pLAD  Course:  - restarted plavix 7/13/24 for new stent (6/12/24) per GI   Plan:  - Continue to hold Plavix 75 mg  - Continue atorvastatin 80 mg daily  - Start aspirin 81 mg daily    #PAD  # A-fib on eliquis  May have been worsened because of GI bleed  - s/p successful DCCV 10/31   - 7/16 In RVR, 2 doses of amio overnight; Cards recommended resuming home dose sotalol 40 mg PO (qTC wnl), s/p 1 dose sotalol but RVR persisted  - 7/16 Amio restarted amio 150 mg followed by loading dose of 1 mg/min for 6 hours and 0.5 mg/min for 18 hours  - 7/17 received amio 150 overnight   - 7/17 Amio load started (amio 400 mg q8 for 12 doses)  Plan:  - Hold eliquis   - Continue amio 200 mg daily  - Start digoxin load (500mcg followed by 250mcg q6)  - Hold home sotalol  - Cardiology following    PULMONARY  #Intubated for airway protection prior to EGD/IR embolization (Resolved)  Course:  - Intubated 7/9  - Extubated 7/15  - Intubated again 7/18  Plan:  - Extubated today    #Sputum production (NA)  - Patient complaining of cough and phlegm  - Patient able to self-suction (7/17)  Plan:  - Currently intubated  - Airway clearance protocol    RENAL/  #RAZIA   #ATN i.s.o hypotension  #metabolic acidosis   Findings:  - pH 7.37 (7/16)  - Cr 1.53 (7/16)  - AG 18 (7/17) with high glucose, concern for DKA but ABG pH 7.39, pCO2 26 pO2 87, pHCO3 16, Beta hydroxybutyrate 0 (7/17)  - AG 13 (7/18)  - ABG pH 7.33, pCO2 30, pO2 136, HCO3 16 (7/21)  Plan:  - f/u nephro recs  - Continue sodium bicarb 1950 mg tid, per nephro  - Per nephro, patient is not a dialysis candidate  - trend BUN/Cr   - monitor Is and Os   - Lasix 40 mg today    #Urinary retention  Plan:  - Started Doxazosin 2 mg daily (7/15)  - Monitor I/Os  - Ivory placed by urology 7/19, making urine    GASTROINTESTINAL  #Upper GI Bleed  Course:  - 7/9 CT A/P - Active bleeding in the third portion of the duodenum. Progression of liver metastases.  - 7/9 EGD showed esophagitis, One non-bleeding duodenal ulcer with a clean ulcer base (Arjun Class III). One oozing duodenal ulcer with spurting hemorrhage (Arjun Class Ia). Treatment not successful. Treated with bipolar cautery.  - 7/9 s/p IR GDA embolization  - 7/12 s/p 5 units pRBC prior to MICU and then 4:2:2  - 7/18 Patient had melanotic stool overnight and hemoglobin drop 10.1 to 7.1 in 24 hours; Reached out to surgery in case of recurrent bleeding per GI if unable to find/contain source during re-scope  - 7/18 EGD showing duodenal bleed s/p 3 clips + epi  - Total EGD: 3  - 7/19 Bloody BM likely old blood  - Patient continues to have melatonic stools with Hgb drop  - 7/20 CTA IMPRESSION: "No evidence of GI bleed. Interval endoscopic versus surgical intervention with metallic streak artifact suggestive of surgical clips in the region of the proximal one third portions of the duodenum. Evaluation of the previously seen hematoma is limited secondary to this artifact. Moderate bilateral pleural effusions and associated compressive atelectasis, right greater than left, overall slightly increased from previous CT. Anasarca."  Plan:  - per GI recs, changed PPI gtt to PPI IV BID  - monitor Hgb, transfuse as needed for Hgb <8  - hold eliquis   - continue to f/u with GI and IR     - Per GI, "Will need PPI BID for 8 weeks for grade D esophagitis and PUD"    - Per GI, high risk of 30 day rebleed (>10-20% risk)  - No acute IR intervention per IR because no active area of extravasation on imaging  - No acute surgical intervention per surgery  - No acute GI intervention per GI  - f/u gastrin level, per surgery    #Nutrition  Course:  - placed OG tube per GI and started ensure clear liquid (see note from RD 7/13/24)  - OG tube removed during extubation (7/15/24)  -dysphagia screen failed  - FEES (7/17) failed  - ENT consulted for vallecular lesion, diagnosed vallecular cyst, no inpatient intervention required, outpatient f/u  Plan:  - Dysphagia screen   - Potential NG tube    #Diarrhea  Course:  - stopped maintenance fluids   - positive for c. diff (7/14)  - started vancomycin (7/14 -)   - having worsening diarrhea, no melena (7/16)  Plan:  - Continue vancomycin until tomorrow  - Start prophylactic dose (q12) on 7/24  - Fluids as necessary to compensate for volume loss 2/2 diarrhea    #Transaminitis (resolving)  Findings:  - Bili 1.1 (7/16) -> 1.3 (7/17)  - Alk P 359 (7/16)-> 484 (7/17)  -  (7/16)->129 (7/17)  - ALT 67 (7/16)-> 72 (7/17)  - RUQ US (7/17): known liver mets, no acute findings  Plan:  - Continue atorvastatin 80 mg daily    INFECTIOUS DISEASE  #leukocytosis  Findings/Course:  - 7/10 BCx - NGTD  - Sputum Cx from ETT 7/13 showed numerous gram neg krishna (Klebsiella oxytoca/raoultella ornithinolytica)  - Started zosyn for empiric treatment (7/11 - 7/18)   - GI panel: previously indeterminate norovirus (7/8), negative (7/13)     - per ID- no intervention needed regarding the PCR results  - c. diff + (7/14)  - started vancomycin for c. diff (7/14- )  - febrile 7/16 overnight  - currently afebrile 7/17  - MRSA Swab: negative  - Staph aureus PCR positive  - Culture resistant to Zosyn (7/18)  - Switched Zosyn to Meropenem (7/18- )  - Patient febrile overnight (7/19)  Plan:  - Continue vancomycin 125 mg q6 for c. diff, on 7/24 switch to q12  - Continue meropenem 1000 mg q8 (7 day course)  - Continue to monitor for fevers    ENDOCRINE  #adrenal insufficiency   Findings:  -7/5 cortisol 17   - 7/11 cortisol 37.1   Plan:  - Continue hydrocortisone 50mg q6  - Monitor sugars with steroid    HEMATOLOGY/ONCOLOGY   # neuroendocrine tumor   - was on lanreotide then had POD in liver and started on peptide receptor radiotherapy (PRRT)- typically given every 8 weeks for 4 doses. He last received it 10/20/2023   - PET 5/28/24 shows new somatostatin avid osseous lesions; decreased intensely avid right supradiaphragmatic and upper abdominal nodes, suspicious for mets  Plan:  - per heme/onc:    - can resume octreotide 150 mcg bid once infection r/o      - can check factor levels V, VIII, X     - "-- [place] [facility] [place] plan for treatment inpatient, if clinically improves/stabilizes then can be considered for treatment outpatient"    #Normocytic anemia   - Pt HgB dropped from 9.0 -> 8.1  - ISO of GI bleed hx and continued melanotic BMs, there is c/o of rebleed   Plan:  - Surgery and GI made aware & are following   - CBC q12    #DVT ppx  - SCDs  - LE duplex (7/17): negative for DVT    SKINS:   - Lines/Tubes - PIV, cordis replaced with central line (7/14), A line (7/20)    Ethics:   - Full Code   - GOC 7/12, spoke to spouse (Yamilet) over phone with palliative care team, discussed GOC again on 7/16    Consults this admission: GI, Heme Onc, Palliative, Endocrinology, Cardiology, Nephrology, Interventional Radiology, General Surgery   Assessment	  Assessment: 79 yo male with metastatic neuroendocrine pancreatic cancer (tx on hold) and PMH of CAD s/p PCI x3 with most recent stent 6/12/24 on Plavix and eliquis , chronic Afib on eliquis, orthostatic hypotension on midodrine, PAD , recent admission for dx cath on 6/24/24 presented from PCP's office for hypotension despite taking AM midodrine dose on 7/2, admitted to medicine for symptomatic hypotension and RAZIA. Per chart, on 7/8 PM, pt was noted to have acute onset of melena x5 and coffee ground emesis x2-3. RRT was called on 7/9 AM for hypotension, hgb dropped from 9.2 to 7.4 (admission baseline 10-11), FOBT +, pt was started on PPI IV and transfused 1 unit of pRBC. GI was consulted and underwent EGD on 7/9 afternoon. MICU was consulted for uncontrolled bleeding during EGD. During EGD, pt became hypotensive and was given phenylephrine, had A-fib with RVR s/p amiodarone. Now s/p IR GDA embolization, admitted to MICU for further moniring i.s.o hemorrhagic shock 2/2 GI bleed. On repeat EGD, oozing but no active bleeding was noted. Hospital course c/b c.diff, and patient has been placed on vancomycin. Patient had melanotic stool 7/18 with Hgb drop 10.1 to 7.1 and received 2 units pRBC and 1 unit platelets. Patient was reintubated and underwent EGD 7/18 afternoon. GI found duodenal bleed. Patient now s/p 3x clips and epi. Since 7/18 patient has continued to have some melanotic stools with hemoglobin drops concerning for slow rebleeding.     Plan:     NEUROLOGIC  # Baseline AOx3  Course:  - intubated 7/9  - weaned off propofol onto precedex  - extubated 7/15  - reintubated 7/18 for EGD  Plan:  - Extubated today  - fentanyl prn for pain control (last dose 7/12/24)    CARDIOVASCULAR  # hemorrhagic shock   Course:  - Per spouse, pt's baseline BP is a little bit over 100  - 7/9: RRT called 7/9 for hypotension; 2/2 to Upper GI bleeds, urgently took to EGD, found bleeding ulcer that was not well controlled, underwent IR empiric embolization  - 7/13: Restarted plavix for new stent (6/12/24) per GI; on henny, maintain MAP > 65; took off vaso and precedex, he got tachy to 118. Added precedex back.   - 7/14 vasopressin added  - 7/16 Albumin 5%  mL given  - 7/17 Vasopressin stopped  - s/p 5 pRBC prior to MICU, and 4 pRBC, 2 FFP, 2 platelets in MICU (7/17)  - 7/18, patient had large melanotic stool with Hgb drop from 10.1-7.1, patient was restarted on henny, vaso, and levo for reintubation for GI scope; s/p 2pRBC, 1 platelet  - D/c droxidopa 200mg q8 given pressor requirements  - 7/20 1 pRBC  - Total transfusions: 12pRBC, 3 platelet  Findings:  - AM cortisol 17 (7/5), AM cortisol 37.1 (7/11)  - POCUS ECHO (7/16) showed no cardiogenic shock, no tamponade, low SV indeterminate IVC, irregular B lines but not concerning for pulmonary edema, and some subdiaphragmatic fluid  Plan:  - Continue to monitor CBC  - Transfuse for Hgb <8, (goal given recent stent)  - Continue henny, try to wean (goal MAP >65)  - Discontinued levo  - Continue to hold Plavix 75 mg  - Continue midodrine 30mg q8  - Continue hydrocortisone 50mg q6 for refractory shock    #CAD s/p PCI x3, most recent stent 6/12/24, NURIA to pLAD  Course:  - restarted plavix 7/13/24 for new stent (6/12/24) per GI   Plan:  - Continue to hold Plavix 75 mg  - Continue atorvastatin 80 mg daily  - Start aspirin 81 mg daily    #PAD  # A-fib on eliquis  May have been worsened because of GI bleed  - s/p successful DCCV 10/31   - 7/16 In RVR, 2 doses of amio overnight; Cards recommended resuming home dose sotalol 40 mg PO (qTC wnl), s/p 1 dose sotalol but RVR persisted  - 7/16 Amio restarted amio 150 mg followed by loading dose of 1 mg/min for 6 hours and 0.5 mg/min for 18 hours  - 7/17 received amio 150 overnight   - 7/17 Amio load started (amio 400 mg q8 for 12 doses)  Plan:  - Hold eliquis   - Continue amio 200 mg daily  - Start digoxin load (500mcg followed by 250mcg q6)  - Hold home sotalol  - Cardiology following    PULMONARY  #Intubated for airway protection prior to EGD/IR embolization (Resolved)  Course:  - Intubated 7/9  - Extubated 7/15  - Intubated again 7/18  Plan:  - Extubated today    #Sputum production (NA)  - Patient complaining of cough and phlegm  - Patient able to self-suction (7/17)  Plan:  - Currently intubated  - Airway clearance protocol    RENAL/  #RAZIA   #ATN i.s.o hypotension  #metabolic acidosis   Findings:  - pH 7.37 (7/16)  - Cr 1.53 (7/16)  - AG 18 (7/17) with high glucose, concern for DKA but ABG pH 7.39, pCO2 26 pO2 87, pHCO3 16, Beta hydroxybutyrate 0 (7/17)  - AG 13 (7/18)  - ABG pH 7.33, pCO2 30, pO2 136, HCO3 16 (7/21)  Plan:  - f/u nephro recs  - Continue sodium bicarb 1950 mg tid, per nephro  - Per nephro, patient is not a dialysis candidate  - trend BUN/Cr   - monitor Is and Os   - Lasix 40 mg today    #Urinary retention  Plan:  - Started Doxazosin 2 mg daily (7/15)  - Monitor I/Os  - Ivory placed by urology 7/19, making urine    GASTROINTESTINAL  #Upper GI Bleed  Course:  - 7/9 CT A/P - Active bleeding in the third portion of the duodenum. Progression of liver metastases.  - 7/9 EGD showed esophagitis, One non-bleeding duodenal ulcer with a clean ulcer base (Arjun Class III). One oozing duodenal ulcer with spurting hemorrhage (Arjun Class Ia). Treatment not successful. Treated with bipolar cautery.  - 7/9 s/p IR GDA embolization  - 7/12 s/p 5 units pRBC prior to MICU and then 4:2:2  - 7/18 Patient had melanotic stool overnight and hemoglobin drop 10.1 to 7.1 in 24 hours; Reached out to surgery in case of recurrent bleeding per GI if unable to find/contain source during re-scope  - 7/18 EGD showing duodenal bleed s/p 3 clips + epi  - Total EGD: 3  - 7/19 Bloody BM likely old blood  - Patient continues to have melatonic stools with Hgb drop  - 7/20 CTA IMPRESSION: "No evidence of GI bleed. Interval endoscopic versus surgical intervention with metallic streak artifact suggestive of surgical clips in the region of the proximal one third portions of the duodenum. Evaluation of the previously seen hematoma is limited secondary to this artifact. Moderate bilateral pleural effusions and associated compressive atelectasis, right greater than left, overall slightly increased from previous CT. Anasarca."  Plan:  - per GI recs, changed PPI gtt to PPI IV BID  - monitor Hgb, transfuse as needed for Hgb <8  - hold eliquis   - continue to f/u with GI and IR     - Per GI, "Will need PPI BID for 8 weeks for grade D esophagitis and PUD"    - Per GI, high risk of 30 day rebleed (>10-20% risk)  - No acute IR intervention per IR because no active area of extravasation on imaging  - No acute surgical intervention per surgery  - No acute GI intervention per GI  - f/u gastrin level, per surgery    #Nutrition  Course:  - placed OG tube per GI and started ensure clear liquid (see note from RD 7/13/24)  - OG tube removed during extubation (7/15/24)  -dysphagia screen failed  - FEES (7/17) failed  - ENT consulted for vallecular lesion, diagnosed vallecular cyst, no inpatient intervention required, outpatient f/u  Plan:  - NG tube today    #Diarrhea  Course:  - stopped maintenance fluids   - positive for c. diff (7/14)  - started vancomycin (7/14 -)   - having worsening diarrhea, no melena (7/16)  Plan:  - Continue vancomycin until tomorrow  - Start prophylactic dose (q12) on 7/24  - Fluids as necessary to compensate for volume loss 2/2 diarrhea    #Transaminitis (resolving)  Findings:  - Bili 1.1 (7/16) -> 1.3 (7/17)  - Alk P 359 (7/16)-> 484 (7/17)  -  (7/16)->129 (7/17)  - ALT 67 (7/16)-> 72 (7/17)  - RUQ US (7/17): known liver mets, no acute findings  Plan:  - Continue atorvastatin 80 mg daily    INFECTIOUS DISEASE  #leukocytosis  Findings/Course:  - 7/10 BCx - NGTD  - Sputum Cx from ETT 7/13 showed numerous gram neg krishna (Klebsiella oxytoca/raoultella ornithinolytica)  - Started zosyn for empiric treatment (7/11 - 7/18)   - GI panel: previously indeterminate norovirus (7/8), negative (7/13)     - per ID- no intervention needed regarding the PCR results  - c. diff + (7/14)  - started vancomycin for c. diff (7/14- )  - febrile 7/16 overnight  - currently afebrile 7/17  - MRSA Swab: negative  - Staph aureus PCR positive  - Culture resistant to Zosyn (7/18)  - Switched Zosyn to Meropenem (7/18- )  - Patient febrile overnight (7/19)  Plan:  - Continue vancomycin 125 mg q6 for c. diff, on 7/24 switch to q12  - Continue meropenem 1000 mg q8 (7 day course)  - Continue to monitor for fevers    ENDOCRINE  #adrenal insufficiency   Findings:  -7/5 cortisol 17   - 7/11 cortisol 37.1   Plan:  - Continue hydrocortisone 50mg q6  - Monitor sugars with steroid    HEMATOLOGY/ONCOLOGY   # neuroendocrine tumor   - was on lanreotide then had POD in liver and started on peptide receptor radiotherapy (PRRT)- typically given every 8 weeks for 4 doses. He last received it 10/20/2023   - PET 5/28/24 shows new somatostatin avid osseous lesions; decreased intensely avid right supradiaphragmatic and upper abdominal nodes, suspicious for mets  Plan:  - per heme/onc:    - can resume octreotide 150 mcg bid once infection r/o      - can check factor levels V, VIII, X     - "-- f/u with Dr. Sophia Prasad at [facility] [place] after discharge, no plan for treatment inpatient, if clinically improves/stabilizes then can be considered for treatment outpatient"    #Normocytic anemia   - Pt HgB dropped from 9.0 -> 8.1  - ISO of GI bleed hx and continued melanotic BMs, there is c/o of rebleed   Plan:  - Surgery and GI made aware & are following   - CBC q12    #DVT ppx  - SCDs  - LE duplex (7/17): negative for DVT    SKINS:   - Lines/Tubes - PIV, cordis replaced with central line (7/14), A line (7/20)    Ethics:   - Full Code   - GOC 7/12, spoke to spouse (Yamilet) over phone with palliative care team, discussed GOC again on 7/16    Consults this admission: GI, Heme Onc, Palliative, Endocrinology, Cardiology, Nephrology, Interventional Radiology, General Surgery

## 2024-07-22 NOTE — PROGRESS NOTE ADULT - SUBJECTIVE AND OBJECTIVE BOX
Gastroenterology/Hepatology Progress Note    Interval Events:   - no pRBC transfusion since ; per RN at bedside, had one episode of melena yesterday AM. Hgb stable.   - remains on pressors, tachycardic but normotensive  - found to have c diff; on PO vancomycin   - s/p extubation today    Allergies:  No Known Allergies      Hospital Medications:  aMIOdarone    Tablet 200 milliGRAM(s) Oral daily  aMIOdarone    Tablet   Oral   atorvastatin 80 milliGRAM(s) Oral at bedtime  chlorhexidine 0.12% Liquid 15 milliLiter(s) Oral Mucosa every 12 hours  chlorhexidine 2% Cloths 1 Application(s) Topical <User Schedule>  dexMEDEtomidine Infusion 0.1 MICROgram(s)/kG/Hr IV Continuous <Continuous>  doxazosin 2 milliGRAM(s) Oral at bedtime  folic acid Injectable 1 milliGRAM(s) IV Push daily  hydrocortisone sodium succinate Injectable 50 milliGRAM(s) IV Push every 6 hours  insulin lispro (ADMELOG) corrective regimen sliding scale   SubCutaneous every 6 hours  meropenem  IVPB      meropenem  IVPB 1000 milliGRAM(s) IV Intermittent every 8 hours  midodrine 30 milliGRAM(s) Oral every 8 hours  mupirocin 2% Nasal 1 Application(s) Both Nostrils two times a day  norepinephrine Infusion 0.05 MICROgram(s)/kG/Min IV Continuous <Continuous>  pantoprazole Infusion 8 mG/Hr IV Continuous <Continuous>  phenylephrine    Infusion 0.1 MICROgram(s)/kG/Min IV Continuous <Continuous>  propofol Infusion 20 MICROgram(s)/kG/Min IV Continuous <Continuous>  sodium bicarbonate 1950 milliGRAM(s) Oral three times a day  thiamine Injectable 100 milliGRAM(s) IV Push daily  vancomycin    Solution 125 milliGRAM(s) Oral every 6 hours  vasopressin Infusion 0.04 Unit(s)/Min IV Continuous <Continuous>      ROS: 14 point ROS unable to be assessed     PHYSICAL EXAM:   Vital Signs:  Vital Signs Last 24 Hrs  T(C): 36.2 (2024 11:45), Max: 37.2 (2024 00:00)  T(F): 97.2 (2024 11:45), Max: 99 (2024 00:00)  HR: 132 (2024 12:45) (120 - 145)  BP: --  BP(mean): --  RR: 22 (2024 12:45) (18 - 36)  SpO2: 100% (2024 12:45) (88% - 100%)    Parameters below as of 2024 08:00  Patient On (Oxygen Delivery Method): ventilator    O2 Concentration (%): 30  Daily     Daily Weight in k.6 (2024 00:00)    GENERAL: Intubated  HEENT:  NCAT, no scleral icterus  CHEST: on ventilator   HEART:  RRR  ABDOMEN:  Soft, non-tender, non-distended, no masses  EXTREMITIES:  No cyanosis, clubbing, or edema  SKIN:  No rash/erythema/ecchymoses/petechiae/wounds/abscess/warm/dry    LABS:                        7.9    13.41 )-----------( 108      ( 2024 08:16 )             24.4     Mean Cell Volume: 92.4 fl (- @ 08:16)        147<H>  |  120<H>  |  52<H>  ----------------------------<  163<H>  3.6   |  14<L>  |  1.40<H>    Ca    7.8<L>      2024 00:10  Phos  4.2       Mg     1.7         TPro  4.2<L>  /  Alb  2.1<L>  /  TBili  0.8  /  DBili  x   /  AST  53<H>  /  ALT  38  /  AlkPhos  184<H>      LIVER FUNCTIONS - ( 2024 00:10 )  Alb: 2.1 g/dL / Pro: 4.2 g/dL / ALK PHOS: 184 U/L / ALT: 38 U/L / AST: 53 U/L / GGT: x           PT/INR - ( 2024 00:10 )   PT: 11.5 sec;   INR: 1.10 ratio         PTT - ( 2024 00:10 )  PTT:27.1 sec  Urinalysis Basic - ( 2024 00:10 )    Color: x / Appearance: x / SG: x / pH: x  Gluc: 163 mg/dL / Ketone: x  / Bili: x / Urobili: x   Blood: x / Protein: x / Nitrite: x   Leuk Esterase: x / RBC: x / WBC x   Sq Epi: x / Non Sq Epi: x / Bacteria: x            Imaging:           Gastroenterology/Hepatology Progress Note    Interval Events:   - no pRBC transfusion since ; per RN at bedside, had one episode of melena yesterday AM. Hgb stable.   - remains on pressors, tachycardic but normotensive  - s/p extubation today    Allergies:  No Known Allergies    Hospital Medications:  aMIOdarone    Tablet 200 milliGRAM(s) Oral daily  aMIOdarone    Tablet   Oral   atorvastatin 80 milliGRAM(s) Oral at bedtime  chlorhexidine 0.12% Liquid 15 milliLiter(s) Oral Mucosa every 12 hours  chlorhexidine 2% Cloths 1 Application(s) Topical <User Schedule>  dexMEDEtomidine Infusion 0.1 MICROgram(s)/kG/Hr IV Continuous <Continuous>  doxazosin 2 milliGRAM(s) Oral at bedtime  folic acid Injectable 1 milliGRAM(s) IV Push daily  hydrocortisone sodium succinate Injectable 50 milliGRAM(s) IV Push every 6 hours  insulin lispro (ADMELOG) corrective regimen sliding scale   SubCutaneous every 6 hours  meropenem  IVPB      meropenem  IVPB 1000 milliGRAM(s) IV Intermittent every 8 hours  midodrine 30 milliGRAM(s) Oral every 8 hours  mupirocin 2% Nasal 1 Application(s) Both Nostrils two times a day  norepinephrine Infusion 0.05 MICROgram(s)/kG/Min IV Continuous <Continuous>  pantoprazole Infusion 8 mG/Hr IV Continuous <Continuous>  phenylephrine    Infusion 0.1 MICROgram(s)/kG/Min IV Continuous <Continuous>  propofol Infusion 20 MICROgram(s)/kG/Min IV Continuous <Continuous>  sodium bicarbonate 1950 milliGRAM(s) Oral three times a day  thiamine Injectable 100 milliGRAM(s) IV Push daily  vancomycin    Solution 125 milliGRAM(s) Oral every 6 hours  vasopressin Infusion 0.04 Unit(s)/Min IV Continuous <Continuous>      ROS: 14 point ROS unable to be assessed     PHYSICAL EXAM:   Vital Signs:  Vital Signs Last 24 Hrs  T(C): 36.2 (2024 11:45), Max: 37.2 (2024 00:00)  T(F): 97.2 (2024 11:45), Max: 99 (2024 00:00)  HR: 132 (2024 12:45) (120 - 145)  BP: --  BP(mean): --  RR: 22 (2024 12:45) (18 - 36)  SpO2: 100% (2024 12:45) (88% - 100%)    Parameters below as of 2024 08:00  Patient On (Oxygen Delivery Method): ventilator    O2 Concentration (%): 30  Daily     Daily Weight in k.6 (2024 00:00)    GENERAL: extubated  HEENT:  NCAT, no scleral icterus  HEART:  RRR  ABDOMEN:  Soft, non-tender, non-distended, no masses  EXTREMITIES:  No cyanosis, clubbing, or edema  SKIN:  No rash/erythema/ecchymoses/petechiae/wounds/abscess/warm/dry    LABS:                        7.9    13.41 )-----------( 108      ( 2024 08:16 )             24.4     Mean Cell Volume: 92.4 fl (24 @ 08:16)        147<H>  |  120<H>  |  52<H>  ----------------------------<  163<H>  3.6   |  14<L>  |  1.40<H>    Ca    7.8<L>      2024 00:10  Phos  4.2       Mg     1.7         TPro  4.2<L>  /  Alb  2.1<L>  /  TBili  0.8  /  DBili  x   /  AST  53<H>  /  ALT  38  /  AlkPhos  184<H>      LIVER FUNCTIONS - ( 2024 00:10 )  Alb: 2.1 g/dL / Pro: 4.2 g/dL / ALK PHOS: 184 U/L / ALT: 38 U/L / AST: 53 U/L / GGT: x           PT/INR - ( 2024 00:10 )   PT: 11.5 sec;   INR: 1.10 ratio         PTT - ( 2024 00:10 )  PTT:27.1 sec  Urinalysis Basic - ( 2024 00:10 )    Color: x / Appearance: x / SG: x / pH: x  Gluc: 163 mg/dL / Ketone: x  / Bili: x / Urobili: x   Blood: x / Protein: x / Nitrite: x   Leuk Esterase: x / RBC: x / WBC x   Sq Epi: x / Non Sq Epi: x / Bacteria: x            Imaging:

## 2024-07-22 NOTE — PROGRESS NOTE ADULT - SUBJECTIVE AND OBJECTIVE BOX
SURGERY DAILY PROGRESS NOTE    SUBJECTIVE: Patient seen and examined on AM rounds at approximately 6:30am. Patient intubated and sedated, NAD.      OBJECTIVE:  Vital Signs Last 24 Hrs  T(C): 36.5 (22 Jul 2024 04:00), Max: 36.8 (21 Jul 2024 20:00)  T(F): 97.7 (22 Jul 2024 04:00), Max: 98.2 (21 Jul 2024 20:00)  HR: 137 (22 Jul 2024 09:30) (125 - 138)  BP: --  BP(mean): --  RR: 15 (22 Jul 2024 08:00) (15 - 37)  SpO2: 100% (22 Jul 2024 09:30) (94% - 100%)    Parameters below as of 21 Jul 2024 20:00  Patient On (Oxygen Delivery Method): ventilator        I&O's Summary    21 Jul 2024 07:01  -  22 Jul 2024 07:00  --------------------------------------------------------  IN: 3496.3 mL / OUT: 1040 mL / NET: 2456.3 mL        Physical Exam:  General Appearance: intubated, sedated  Chest: mechanical ventilation  CV: Afib HR 130s, 107/60 (81) on 4 henny  Abdomen: Soft, mild upper abdominal tenderness, mildly distended  Extremities: Grossly symmetric, [facility]    LABS:                        7.7    12.10 )-----------( 117      ( 22 Jul 2024 11:05 )             23.2     07-21    148<H>  |  120<H>  |  46<H>  ----------------------------<  140<H>  3.2<L>   |  16<L>  |  1.25    Ca    7.9<L>      21 Jul 2024 23:56  Phos  3.9     07-21  Mg     1.6     07-21    TPro  4.4<L>  /  Alb  2.1<L>  /  TBili  0.6  /  DBili  x   /  AST  66<H>  /  ALT  41  /  AlkPhos  222<H>  07-21    PT/INR - ( 21 Jul 2024 23:56 )   PT: 10.8 sec;   INR: 0.98 ratio         PTT - ( 21 Jul 2024 23:56 )  PTT:26.9 sec  Urinalysis Basic - ( 21 Jul 2024 23:56 )    Color: x / Appearance: x / SG: x / pH: x  Gluc: 140 mg/dL / Ketone: x  / Bili: x / Urobili: x   Blood: x / Protein: x / Nitrite: x   Leuk Esterase: x / RBC: x / WBC x   Sq Epi: x / Non Sq Epi: x / Bacteria: x

## 2024-07-22 NOTE — PROGRESS NOTE ADULT - SUBJECTIVE AND OBJECTIVE BOX
DATE OF SERVICE: 07-22-24 @ 16:54    Patient is a 78y old  Male who presents with a chief complaint of hypotension (22 Jul 2024 13:27)      INTERVAL HISTORY: S/p extubation today    REVIEW OF SYSTEMS:  CONSTITUTIONAL: No weakness  EYES/ENT: No visual changes;  No throat pain   NECK: No pain or stiffness  RESPIRATORY: No cough, wheezing; No shortness of breath  CARDIOVASCULAR: No chest pain or palpitations  GASTROINTESTINAL: No abdominal  pain. No nausea, vomiting, or hematemesis  GENITOURINARY: No dysuria, frequency or hematuria  NEUROLOGICAL: No stroke like symptoms  SKIN: No rashes      	  MEDICATIONS:  aMIOdarone    Tablet 200 milliGRAM(s) Oral daily  aMIOdarone    Tablet   Oral   digoxin  IVPB 250 MICROGram(s) IV Intermittent every 6 hours  doxazosin 2 milliGRAM(s) Oral at bedtime  midodrine 30 milliGRAM(s) Oral every 8 hours  phenylephrine    Infusion 0.1 MICROgram(s)/kG/Min IV Continuous <Continuous>        PHYSICAL EXAM:  T(C): 36.8 (07-22-24 @ 16:00), Max: 36.8 (07-21-24 @ 20:00)  HR: 137 (07-22-24 @ 16:15) (120 - 143)  BP: --  RR: 16 (07-22-24 @ 16:15) (15 - 43)  SpO2: 100% (07-22-24 @ 16:15) (93% - 100%)  Wt(kg): --  I&O's Summary    21 Jul 2024 07:01  -  22 Jul 2024 07:00  --------------------------------------------------------  IN: 3496.3 mL / OUT: 1070 mL / NET: 2426.3 mL    22 Jul 2024 07:01  -  22 Jul 2024 16:54  --------------------------------------------------------  IN: 604.4 mL / OUT: 950 mL / NET: -345.6 mL          Appearance: In no distress	  HEENT:    PERRL, EOMI	  Cardiovascular:  S1 S2, No JVD  Respiratory: Lungs clear to auscultation	  Gastrointestinal:  Soft, Non-tender, + BS	  Vascularature:  No edema of LE  Psychiatric: Appropriate affect   Neuro: no acute focal deficits                               7.7    12.10 )-----------( 117      ( 22 Jul 2024 11:05 )             23.2     07-22    151<H>  |  119<H>  |  45<H>  ----------------------------<  143<H>  3.3<L>   |  17<L>  |  1.27    Ca    8.0<L>      22 Jul 2024 15:01  Phos  3.7     07-22  Mg     2.2     07-22    TPro  4.6<L>  /  Alb  2.3<L>  /  TBili  0.6  /  DBili  x   /  AST  74<H>  /  ALT  42  /  AlkPhos  240<H>  07-22        Labs personally reviewed      ASSESSMENT/PLAN: 	  78-year-old male multiple medical problems history of hepatocellular carcinoma status post radiation therapy, AF s/p DCCV on Eliquis who was found to be hypotensive following NST. Patient has reported general weakness, fatigue, lightheadedness since being discharged from hospital. Reports mild chest discomfort in midsternal region. Reports dyspnea with minimal exertion. Also reports significant lack of appetite and poor PO intake. No dark or bloody stool, nausea or vomiting.       Problem/Plan - 1:  ·  Problem: Hypotension  ·  Plan: Continue with pressors in MICU  - Patient presents with hypotension and also RAZIA. Has known orthostatic hypotension.   - Patient and wife report decreased PO intake likely 2/2 malignancy.  - GIB 7/9, s/p 4 units prbc, IR for mesenteric embolization, now in MICU on pressors  - c/w Midodrine and IV pressors (wean as tolerated as per MICU)     Problem/Plan - 2:  ·  Problem: CAD.   ·  Plan: Recent PCI to pLAD in June 2023  - ECG non-ischemic  - Plavix held given large melena; ideally should be on Plavix but high risk to resume  - Attempt ASA 81mg PO daily       Problem/Plan - 3:  ·  Problem: Atrial Fibrillation  - s/p succesful DCCV 10/31  - Hold Eliquis 5mg BID given GIB  -  Started on Amio 400mg TID per ICU  - However would advise against chemical cardioversion off AC if possible          ISAMAR Foster,  [facility]  Cardiovascular Medicine  [address] [place] [address], [address]  Available via call or text on [employer]  Office: [phone]

## 2024-07-22 NOTE — PROGRESS NOTE ADULT - ASSESSMENT
77 yo male with history of CAD s/p PCI x3 (most recently 6/12/24 on Plavix, recent cath 6/24/24), afib on Eliquis, orthostatic hypotension on midodrine, PAD, multiple spine surgeries including ALIF, and metastatic neuroendocrine tumor admitted for hypotension and RAZIA. Course complicated by recurrent GIBs requiring multiple transfusions as well as GDA embolization 7/9 and hemostatic agents/clips on EGD 7/12 and 7/18. No recurrent melena/bloody BMs overnight, H&H stable since last transfusion 7/20, pressor requirements downtrending.    Recommendations:  - No acute surgical intervention  - If significant recurrence of UGIB, may consider emergent open duodenotomy   - Please check gastrin level  - GI recs appreciated, continue PPI  - Continue to hold AC/AP  - Global care per MICU    Trauma/ACS  [id number]

## 2024-07-23 NOTE — PROCEDURE NOTE - NSSITEPREP_SKIN_A_CORE
chlorhexidine
chlorhexidine/Adherence to aseptic technique: hand hygiene prior to donning barriers (gown, gloves), don cap and mask, sterile drape over patient
chlorhexidine/Adherence to aseptic technique: hand hygiene prior to donning barriers (gown, gloves), don cap and mask, sterile drape over patient
povidone iodine (if allergic to chlorhexidine)

## 2024-07-23 NOTE — PROGRESS NOTE ADULT - SUBJECTIVE AND OBJECTIVE BOX
DATE OF SERVICE: 07-23-24 @ 18:32    Patient is a 78y old  Male who presents with a chief complaint of hypotension (23 Jul 2024 12:52)      INTERVAL HISTORY: In no acute distress.     REVIEW OF SYSTEMS:  CONSTITUTIONAL: No weakness  EYES/ENT: No visual changes;  No throat pain   NECK: No pain or stiffness  RESPIRATORY: No cough, wheezing; No shortness of breath  CARDIOVASCULAR: No chest pain or palpitations  GASTROINTESTINAL: No abdominal  pain. No nausea, vomiting, or hematemesis  GENITOURINARY: No dysuria, frequency or hematuria  NEUROLOGICAL: No stroke like symptoms  SKIN: No rashes    TELEMETRY Personally reviewed: slow AF with frequent runs of WCT  	  MEDICATIONS:  aMIOdarone    Tablet   Oral   aMIOdarone    Tablet 200 milliGRAM(s) Oral daily  doxazosin 2 milliGRAM(s) Oral at bedtime  midodrine 30 milliGRAM(s) Oral every 8 hours  phenylephrine    Infusion 0.1 MICROgram(s)/kG/Min IV Continuous <Continuous>        PHYSICAL EXAM:  T(C): 36.1 (07-23-24 @ 16:00), Max: 37 (07-22-24 @ 20:00)  HR: 65 (07-23-24 @ 18:00) (62 - 144)  BP: --  RR: 15 (07-23-24 @ 18:00) (11 - 34)  SpO2: 100% (07-23-24 @ 18:00) (92% - 100%)  Wt(kg): --  I&O's Summary    22 Jul 2024 07:01  -  23 Jul 2024 07:00  --------------------------------------------------------  IN: 3329.4 mL / OUT: 2555 mL / NET: 774.4 mL    23 Jul 2024 07:01  -  23 Jul 2024 18:32  --------------------------------------------------------  IN: 2214.3 mL / OUT: 550 mL / NET: 1664.3 mL          Appearance: In no distress	  HEENT:    PERRL, EOMI	  Cardiovascular:  S1 S2, No JVD  Respiratory: Lungs clear to auscultation	  Gastrointestinal:  Soft, Non-tender, + BS	  Vascularature:  anasarca  Psychiatric: Appropriate affect   Neuro: no acute focal deficits                               6.5    8.72  )-----------( 131      ( 23 Jul 2024 14:41 )             20.4     07-23    152<H>  |  122<H>  |  54<H>  ----------------------------<  198<H>  3.3<L>   |  15<L>  |  1.21    Ca    7.4<L>      23 Jul 2024 14:41  Phos  3.6     07-23  Mg     2.2     07-23    TPro  4.1<L>  /  Alb  2.2<L>  /  TBili  0.5  /  DBili  x   /  AST  67<H>  /  ALT  31  /  AlkPhos  171<H>  07-23        Labs personally reviewed      ASSESSMENT/PLAN: 	    78-year-old male multiple medical problems history of hepatocellular carcinoma status post radiation therapy, AF s/p DCCV on Eliquis who was found to be hypotensive following NST. Patient has reported general weakness, fatigue, lightheadedness since being discharged from hospital. Reports mild chest discomfort in midsternal region. Reports dyspnea with minimal exertion. Also reports significant lack of appetite and poor PO intake. No dark or bloody stool, nausea or vomiting.       Problem/Plan - 1:  ·  Problem: Hypotension  ·  Plan: Continue with pressors in MICU  - Patient presents with hypotension and also RAZIA. Has known orthostatic hypotension.   - Patient and wife report decreased PO intake likely 2/2 malignancy.  - GIB 7/9, s/p 4 units prbc, IR for mesenteric embolization, now in MICU on pressors  - c/w Midodrine and IV pressors (wean as tolerated as per MICU)     Problem/Plan - 2:  ·  Problem: CAD.   ·  Plan: Recent PCI to pLAD in June 2023  - ECG non-ischemic  - Plavix held given large melena; ideally should be on Plavix but high risk to resume      Problem/Plan - 3:  ·  Problem: Atrial Fibrillation  - s/p succesful DCCV 10/31  - Hold Eliquis 5mg BID given GIB  -  Started on Amio 400mg TID per ICU  - However would advise against chemical cardioversion off AC if possible        ROSIO Latham-ESTEFANI Hayes DO [facility]  Cardiovascular Medicine  [address] [place] [address], [address]  Available through call or text on [employer]  Office: [phone]

## 2024-07-23 NOTE — PROGRESS NOTE ADULT - ASSESSMENT
recurrent UGI bleed, most likely from duodenal ulcers  -7/9, 7/12, 7/18 - multiple EGDs  -7/9 - empiric GDA embolization  -If the patient has massive UGI bleed, I would offer ex-lap / duodenotomy as a life-saving maneuver. Otherwise, I am hesitant to perform antrectomy or duodenotomy on this patient since he is in poor condition for surgery and could also be having melena from grade D esophagitis which was seen on EGD.  -f/u serum gastrin given his known metastatic neuroendocrine tumor (although the patient was on PPI)  -f/u H pylori stool antigen      [facility]

## 2024-07-23 NOTE — PROGRESS NOTE ADULT - SUBJECTIVE AND OBJECTIVE BOX
[facility]   [phone]  RENAL FOLLOW UP NOTE  --------------------------------------------------------------------------------  Chief Complaint:    24 hour events/subjective:    seen earlier   extubated   confused but comfortable appearing   o/n hb 6.6     PAST HISTORY  --------------------------------------------------------------------------------  No significant changes to PMH, PSH, FHx, SHx, unless otherwise noted    ALLERGIES & MEDICATIONS  --------------------------------------------------------------------------------  Allergies    No Known Allergies    Intolerances      Standing Inpatient Medications  aMIOdarone    Tablet   Oral   aMIOdarone    Tablet 200 milliGRAM(s) Oral daily  atorvastatin 80 milliGRAM(s) Oral at bedtime  chlorhexidine 2% Cloths 1 Application(s) Topical <User Schedule>  chlorhexidine 4% Liquid 1 Application(s) Topical <User Schedule>  dexMEDEtomidine Infusion 0.5 MICROgram(s)/kG/Hr IV Continuous <Continuous>  doxazosin 2 milliGRAM(s) Oral at bedtime  folic acid Injectable 1 milliGRAM(s) IV Push daily  hydrocortisone sodium succinate Injectable 50 milliGRAM(s) IV Push every 6 hours  insulin lispro (ADMELOG) corrective regimen sliding scale   SubCutaneous every 6 hours  meropenem  IVPB 1000 milliGRAM(s) IV Intermittent every 8 hours  meropenem  IVPB      midodrine 30 milliGRAM(s) Oral every 8 hours  pantoprazole  Injectable 40 milliGRAM(s) IV Push every 12 hours  phenylephrine    Infusion 0.1 MICROgram(s)/kG/Min IV Continuous <Continuous>  sodium bicarbonate 1950 milliGRAM(s) Oral three times a day  thiamine Injectable 100 milliGRAM(s) IV Push daily  vasopressin Infusion 0.04 Unit(s)/Min IV Continuous <Continuous>    PRN Inpatient Medications  sodium chloride 0.9% lock flush 10 milliLiter(s) IV Push every 1 hour PRN      REVIEW OF SYSTEMS  --------------------------------------------------------------------------------      VITALS/PHYSICAL EXAM  --------------------------------------------------------------------------------  T(C): 36.7 (07-23-24 @ 20:00), Max: 37 (07-23-24 @ 00:00)  HR: 60 (07-23-24 @ 20:45) (60 - 144)  BP: --  RR: 14 (07-23-24 @ 20:45) (11 - 34)  SpO2: 100% (07-23-24 @ 20:45) (92% - 100%)  Wt(kg): --        07-22-24 @ 07:01  -  07-23-24 @ 07:00  --------------------------------------------------------  IN: 3329.4 mL / OUT: 2555 mL / NET: 774.4 mL    07-23-24 @ 07:01  -  07-23-24 @ 20:59  --------------------------------------------------------  IN: 2473.6 mL / OUT: 630 mL / NET: 1843.6 mL      Physical Exam:  	  Gen:  confused   	Pulm: decrease bs  no rales  -  wheezing  	CV: No JVD. RRR, S1S2; no rub  	Abd: +BS,  + ascites and distended  	: No bladder distention   	UE: Warm, no cyanosis  no clubbing,  no edema  	LE: Warm, no cyanosis  no clubbing,  4+  edema    LABS/STUDIES  --------------------------------------------------------------------------------              7.9    9.87  >-----------<  105      [07-23-24 @ 19:50]              23.7     152  |  122  |  54  ----------------------------<  198      [07-23-24 @ 14:41]  3.3   |  15  |  1.21        Ca     7.4     [07-23-24 @ 14:41]      Mg     2.2     [07-23-24 @ 14:41]      Phos  3.6     [07-23-24 @ 14:41]    TPro  4.1  /  Alb  2.2  /  TBili  0.5  /  DBili  x   /  AST  67  /  ALT  31  /  AlkPhos  171  [07-23-24 @ 14:41]    PT/INR: PT 10.6 , INR 1.01       [07-23-24 @ 00:43]  PTT: 27.4       [07-23-24 @ 00:43]          [07-21-24 @ 23:56]    Creatinine Trend:  SCr 1.21 [07-23 @ 14:41]  SCr 1.34 [07-23 @ 00:43]  SCr 1.27 [07-22 @ 15:01]  SCr 1.25 [07-21 @ 23:56]  SCr 1.40 [07-21 @ 00:10]        TSH 2.47      [07-04-24 @ 12:31]

## 2024-07-23 NOTE — PROGRESS NOTE ADULT - ATTENDING COMMENTS
78M w/ CAD (recent stent 6/12/24), Afib, metastatic neuroendocrine pancreatic tumor. Admitted w/ hypotension, course complicated by ABLA and GIB. Undergone EGD #1 on 7/9 which showed uncontrolled bleeding, after which pt developed Afib w/ RVR and hemorrhagic shock, s/p IR for GDA embolization. EGD #2 on 7/12 showed oozing but no active bleeding. Course further complicated by Cdiff colitis. Had another episode of melena and ABLA on 7/18 s/p EGD #3 showed bleeding vessel s/p clipx3 and epi. Pt remains intubated post-procedure. Extubated yesterday. Continues to have intermittent melanotic stools, most recently last night after 1 dose of asa w/ worsening pressor requirements and requiring 1 pRBC for dropping hgb.     Awake and alert. Worsening shock state due to continued hemorrhagic shock overnight with dropping hgb and melena, add vasopressin and continue phenylephrine, continue midodrine and stress dose steroids, titrate to MAP >/65-70; afib w/ RVR somewhat improved s/p IV digoxin load, f/u AM digoxin level and continue amiodarone; attempted asa challenge yesterday evening but then bled overnight, keep off DAPT, known recent NURIA in 6/2024. Extubated yesterday, satting well on RA. On vancomycin PO for C diff- last day today for 10 day course; continue meropenam for Klebsiella pneumonia, plan for 7 day course; otherwise all cx NGTD. RAZIA likely prerenal ATN, stable, non-oliguric; continue free water for hyperNa; tavarez placed by urology - no plan to remove for now. NGT in place as pt has failed several swallow evaluations prior, keep NPO for now in setting of continued GIB; surgery f/u today; continue PPI q12; s/p EGD x3 and GDA embolization for multiple GIB. ABLA due to GIB, received additional 1 pRBC overnight for drop in hgb and worsening shock state without appropriate response - given additional pRBC, as well 1 plt, 1 FFP; count is 13:3:2; CBC q6 today. FS w/ ISS coverage. Prognosis guarded. Full code

## 2024-07-23 NOTE — PROGRESS NOTE ADULT - SUBJECTIVE AND OBJECTIVE BOX
Jodie Okeefe MD PGY-1  ---------------------------------------------------------------------------------------------  Patient is a 78y old  Male who presents with a chief complaint of hypotension (20 Jul 2024 22:17)    HPI:  77 yo male with PMH of CAD s/p PCI x3 with most recent stent 6/12/24 on Plavix, chronic Afib on eliquis, orthostatic hypotension on midodrine, PAD, 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 Patient states since discharge on this past Saturday, he continued to feel ongoing generalized weakness and dizziness with positional changes despite compliance with home midodrine 15mg TID and holding torsemide as instructed. Admits to poor PO intake of fluids/solute due to ongoing issues with poor appetite and nausea with meals which is not new. Denies any fever, chills, chest pain, SOB, cough, abd pain, dysuria nor urinary frequency. Has chronic diarrhea that is unchanged from his baseline.     In the ED, vitals notable for SBP 92-11s. Labs notable for elevated BUN/Cr 31/1.59 (from 30/1.18 on day of discharge 6/29/24). CXR with clear lungs. Abd US with innumerable intrahepatic echogenic masses consistent with known metastatic neuroendocrine tumor. Admitted to medicine for symptomatic hypotension and RAZIA. (02 Jul 2024 18:34)    SUBJECTIVE / OVERNIGHT EVENTS:  Overnight ***    Gtt ***  Ivory in place      MEDICATIONS  (STANDING):  aMIOdarone    Tablet 400 milliGRAM(s) Oral every 8 hours  aMIOdarone    Tablet 200 milliGRAM(s) Oral daily  aMIOdarone    Tablet   Oral   atorvastatin 80 milliGRAM(s) Oral at bedtime  chlorhexidine 0.12% Liquid 15 milliLiter(s) Oral Mucosa every 12 hours  chlorhexidine 2% Cloths 1 Application(s) Topical <User Schedule>  dexMEDEtomidine Infusion 0.1 MICROgram(s)/kG/Hr (2.47 mL/Hr) IV Continuous <Continuous>  doxazosin 2 milliGRAM(s) Oral at bedtime  folic acid Injectable 1 milliGRAM(s) IV Push daily  insulin lispro (ADMELOG) corrective regimen sliding scale   SubCutaneous every 6 hours  meropenem  IVPB 1000 milliGRAM(s) IV Intermittent every 8 hours  meropenem  IVPB      midodrine 30 milliGRAM(s) Oral every 8 hours  mupirocin 2% Nasal 1 Application(s) Both Nostrils two times a day  norepinephrine Infusion 0.05 MICROgram(s)/kG/Min (9.25 mL/Hr) IV Continuous <Continuous>  pantoprazole Infusion 8 mG/Hr (10 mL/Hr) IV Continuous <Continuous>  phenylephrine    Infusion 0.1 MICROgram(s)/kG/Min (1.85 mL/Hr) IV Continuous <Continuous>  propofol Infusion 20 MICROgram(s)/kG/Min (11.8 mL/Hr) IV Continuous <Continuous>  sodium bicarbonate 1300 milliGRAM(s) Oral three times a day  thiamine Injectable 100 milliGRAM(s) IV Push daily  vancomycin    Solution 125 milliGRAM(s) Oral every 6 hours  vasopressin Infusion 0.04 Unit(s)/Min (6 mL/Hr) IV Continuous <Continuous>    MEDICATIONS  (PRN):    Allergies    No Known Allergies    ICU Vital Signs Last 24 Hrs  T(F): 98 (21 Jul 2024 04:00), Max: 99 (20 Jul 2024 08:00)  HR: 137 (21 Jul 2024 04:30) (100 - 137)  BP: --  BP(mean): --  ABP: 112/54 (21 Jul 2024 04:30) (78/42 - 129/67)  ABP(mean): 78 (21 Jul 2024 04:30) (54 - 94)  RR: 29 (21 Jul 2024 04:30) (18 - 32)  SpO2: 100% (21 Jul 2024 04:30) (97% - 100%)    Physical Exam:   GENERAL: NAD, lying in bed  HEAD:  Atraumatic, Normocephalic  EYES: EOMI, PERRLA, conjunctiva and sclera clear  CHEST/LUNG: Breath sounds bilaterally. Unlabored respirations  HEART: Tachycardic, irregular rhythm  ABDOMEN: Bowel sounds present; Soft, Nontender, Slight distension  EXTREMITIES: Bilateral edema of upper and lower extremities  NERVOUS SYSTEM: Speech ***  SKIN: Fullness in right scapular region, no discoloration visible    I&O's Summary    19 Jul 2024 07:01  -  20 Jul 2024 07:00  --------------------------------------------------------  IN: 2987.1 mL / OUT: 1985 mL / NET: 1002.1 mL    20 Jul 2024 07:01  -  21 Jul 2024 04:54  --------------------------------------------------------  IN: 2815.1 mL / OUT: 992 mL / NET: 1823.1 mL        LABS:                        8.1    14.13 )-----------( 103      ( 21 Jul 2024 00:10 )             24.9     07-21    147<H>  |  120<H>  |  52<H>  ----------------------------<  163<H>  3.6   |  14<L>  |  1.40<H>    Ca    7.8<L>      21 Jul 2024 00:10  Phos  4.2     07-21  Mg     1.7     07-21    TPro  4.2<L>  /  Alb  2.1<L>  /  TBili  0.8  /  DBili  x   /  AST  53<H>  /  ALT  38  /  AlkPhos  184<H>  07-21    PT/INR - ( 21 Jul 2024 00:10 )   PT: 11.5 sec;   INR: 1.10 ratio         PTT - ( 21 Jul 2024 00:10 )  PTT:27.1 sec  ABG - ( 21 Jul 2024 00:00 )  pH, Arterial: 7.33  pH, Blood: x     /  pCO2: 30    /  pO2: 136   / HCO3: 16    / Base Excess: -9.2  /  SaO2: 100.0     Urinalysis Basic - ( 21 Jul 2024 00:10 )    Color: x / Appearance: x / SG: x / pH: x  Gluc: 163 mg/dL / Ketone: x  / Bili: x / Urobili: x   Blood: x / Protein: x / Nitrite: x   Leuk Esterase: x / RBC: x / WBC x   Sq Epi: x / Non Sq Epi: x / Bacteria: x      CARDIAC MARKERS ( 21 Jul 2024 00:10 )  x     / x     / 228 U/L / x     / x      CARDIAC MARKERS ( 20 Jul 2024 00:47 )  x     / x     / 137 U/L / x     / x          CAPILLARY BLOOD GLUCOSE      POCT Blood Glucose.: 167 mg/dL (20 Jul 2024 17:08)  POCT Blood Glucose.: 175 mg/dL (20 Jul 2024 12:17)  POCT Blood Glucose.: 107 mg/dL (20 Jul 2024 05:38)      RADIOLOGY & ADDITIONAL TESTS:  Results Reviewed:   Imaging Personally Reviewed:  Electrocardiogram Personally Reviewed: Jodie Okeefe MD PGY-1  ---------------------------------------------------------------------------------------------  Patient is a 78y old  Male who presents with a chief complaint of hypotension (20 Jul 2024 22:17)    HPI:  77 yo male with PMH of CAD s/p PCI x3 with most recent stent 6/12/24 on Plavix, chronic Afib on eliquis, orthostatic hypotension on midodrine, PAD, 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 Patient states since discharge on this past Saturday, he continued to feel ongoing generalized weakness and dizziness with positional changes despite compliance with home midodrine 15mg TID and holding torsemide as instructed. Admits to poor PO intake of fluids/solute due to ongoing issues with poor appetite and nausea with meals which is not new. Denies any fever, chills, chest pain, SOB, cough, abd pain, dysuria nor urinary frequency. Has chronic diarrhea that is unchanged from his baseline.     In the ED, vitals notable for SBP 92-11s. Labs notable for elevated BUN/Cr 31/1.59 (from 30/1.18 on day of discharge 6/29/24). CXR with clear lungs. Abd US with innumerable intrahepatic echogenic masses consistent with known metastatic neuroendocrine tumor. Admitted to medicine for symptomatic hypotension and RAZIA. (02 Jul 2024 18:34)    SUBJECTIVE / OVERNIGHT EVENTS:  Overnight, patient Hgb dropped to 6.6 and received *** blood.    Gtt ***  Ivory in place      MEDICATIONS  (STANDING):  aMIOdarone    Tablet 400 milliGRAM(s) Oral every 8 hours  aMIOdarone    Tablet 200 milliGRAM(s) Oral daily  aMIOdarone    Tablet   Oral   atorvastatin 80 milliGRAM(s) Oral at bedtime  chlorhexidine 0.12% Liquid 15 milliLiter(s) Oral Mucosa every 12 hours  chlorhexidine 2% Cloths 1 Application(s) Topical <User Schedule>  dexMEDEtomidine Infusion 0.1 MICROgram(s)/kG/Hr (2.47 mL/Hr) IV Continuous <Continuous>  doxazosin 2 milliGRAM(s) Oral at bedtime  folic acid Injectable 1 milliGRAM(s) IV Push daily  insulin lispro (ADMELOG) corrective regimen sliding scale   SubCutaneous every 6 hours  meropenem  IVPB 1000 milliGRAM(s) IV Intermittent every 8 hours  meropenem  IVPB      midodrine 30 milliGRAM(s) Oral every 8 hours  mupirocin 2% Nasal 1 Application(s) Both Nostrils two times a day  norepinephrine Infusion 0.05 MICROgram(s)/kG/Min (9.25 mL/Hr) IV Continuous <Continuous>  pantoprazole Infusion 8 mG/Hr (10 mL/Hr) IV Continuous <Continuous>  phenylephrine    Infusion 0.1 MICROgram(s)/kG/Min (1.85 mL/Hr) IV Continuous <Continuous>  propofol Infusion 20 MICROgram(s)/kG/Min (11.8 mL/Hr) IV Continuous <Continuous>  sodium bicarbonate 1300 milliGRAM(s) Oral three times a day  thiamine Injectable 100 milliGRAM(s) IV Push daily  vancomycin    Solution 125 milliGRAM(s) Oral every 6 hours  vasopressin Infusion 0.04 Unit(s)/Min (6 mL/Hr) IV Continuous <Continuous>    MEDICATIONS  (PRN):    Allergies    No Known Allergies    ICU Vital Signs Last 24 Hrs  T(F): 98 (21 Jul 2024 04:00), Max: 99 (20 Jul 2024 08:00)  HR: 137 (21 Jul 2024 04:30) (100 - 137)  BP: --  BP(mean): --  ABP: 112/54 (21 Jul 2024 04:30) (78/42 - 129/67)  ABP(mean): 78 (21 Jul 2024 04:30) (54 - 94)  RR: 29 (21 Jul 2024 04:30) (18 - 32)  SpO2: 100% (21 Jul 2024 04:30) (97% - 100%)    Physical Exam:   GENERAL: NAD, lying in bed  HEAD:  Atraumatic, Normocephalic  EYES: EOMI, PERRLA, conjunctiva and sclera clear  CHEST/LUNG: Breath sounds bilaterally. Unlabored respirations  HEART: Tachycardic, irregular rhythm  ABDOMEN: Bowel sounds present; Soft, Nontender, Slight distension  EXTREMITIES: Bilateral edema of upper and lower extremities  NERVOUS SYSTEM: Speech ***  SKIN: Fullness in right scapular region, no discoloration visible    I&O's Summary    19 Jul 2024 07:01  -  20 Jul 2024 07:00  --------------------------------------------------------  IN: 2987.1 mL / OUT: 1985 mL / NET: 1002.1 mL    20 Jul 2024 07:01  -  21 Jul 2024 04:54  --------------------------------------------------------  IN: 2815.1 mL / OUT: 992 mL / NET: 1823.1 mL        LABS:                        8.1    14.13 )-----------( 103      ( 21 Jul 2024 00:10 )             24.9     07-21    147<H>  |  120<H>  |  52<H>  ----------------------------<  163<H>  3.6   |  14<L>  |  1.40<H>    Ca    7.8<L>      21 Jul 2024 00:10  Phos  4.2     07-21  Mg     1.7     07-21    TPro  4.2<L>  /  Alb  2.1<L>  /  TBili  0.8  /  DBili  x   /  AST  53<H>  /  ALT  38  /  AlkPhos  184<H>  07-21    PT/INR - ( 21 Jul 2024 00:10 )   PT: 11.5 sec;   INR: 1.10 ratio         PTT - ( 21 Jul 2024 00:10 )  PTT:27.1 sec  ABG - ( 21 Jul 2024 00:00 )  pH, Arterial: 7.33  pH, Blood: x     /  pCO2: 30    /  pO2: 136   / HCO3: 16    / Base Excess: -9.2  /  SaO2: 100.0     Urinalysis Basic - ( 21 Jul 2024 00:10 )    Color: x / Appearance: x / SG: x / pH: x  Gluc: 163 mg/dL / Ketone: x  / Bili: x / Urobili: x   Blood: x / Protein: x / Nitrite: x   Leuk Esterase: x / RBC: x / WBC x   Sq Epi: x / Non Sq Epi: x / Bacteria: x      CARDIAC MARKERS ( 21 Jul 2024 00:10 )  x     / x     / 228 U/L / x     / x      CARDIAC MARKERS ( 20 Jul 2024 00:47 )  x     / x     / 137 U/L / x     / x          CAPILLARY BLOOD GLUCOSE      POCT Blood Glucose.: 167 mg/dL (20 Jul 2024 17:08)  POCT Blood Glucose.: 175 mg/dL (20 Jul 2024 12:17)  POCT Blood Glucose.: 107 mg/dL (20 Jul 2024 05:38)      RADIOLOGY & ADDITIONAL TESTS:  Results Reviewed:   Imaging Personally Reviewed:  Electrocardiogram Personally Reviewed: Jodie Okeefe MD PGY-1  ---------------------------------------------------------------------------------------------  Patient is a 78y old  Male who presents with a chief complaint of hypotension (20 Jul 2024 22:17)    HPI:  77 yo male with PMH of CAD s/p PCI x3 with most recent stent 6/12/24 on Plavix, chronic Afib on eliquis, orthostatic hypotension on midodrine, PAD, 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 Patient states since discharge on this past Saturday, he continued to feel ongoing generalized weakness and dizziness with positional changes despite compliance with home midodrine 15mg TID and holding torsemide as instructed. Admits to poor PO intake of fluids/solute due to ongoing issues with poor appetite and nausea with meals which is not new. Denies any fever, chills, chest pain, SOB, cough, abd pain, dysuria nor urinary frequency. Has chronic diarrhea that is unchanged from his baseline.     In the ED, vitals notable for SBP 92-11s. Labs notable for elevated BUN/Cr 31/1.59 (from 30/1.18 on day of discharge 6/29/24). CXR with clear lungs. Abd US with innumerable intrahepatic echogenic masses consistent with known metastatic neuroendocrine tumor. Admitted to medicine for symptomatic hypotension and RAZIA. (02 Jul 2024 18:34)    SUBJECTIVE / OVERNIGHT EVENTS:  Overnight, NG tube placed. Patient had a large melanotic stool. Hgb dropped to 6.6 and received 1 unit pRBC. Vaso was started for a short period of time. Patient was having some SVT runs. Patient given dig 2.5. HRs 110-120s.     Gtt henny 2.5 (double concentrate)  Ivory in place    MEDICATIONS  (STANDING):  aMIOdarone    Tablet 400 milliGRAM(s) Oral every 8 hours  aMIOdarone    Tablet 200 milliGRAM(s) Oral daily  aMIOdarone    Tablet   Oral   atorvastatin 80 milliGRAM(s) Oral at bedtime  chlorhexidine 0.12% Liquid 15 milliLiter(s) Oral Mucosa every 12 hours  chlorhexidine 2% Cloths 1 Application(s) Topical <User Schedule>  dexMEDEtomidine Infusion 0.1 MICROgram(s)/kG/Hr (2.47 mL/Hr) IV Continuous <Continuous>  doxazosin 2 milliGRAM(s) Oral at bedtime  folic acid Injectable 1 milliGRAM(s) IV Push daily  insulin lispro (ADMELOG) corrective regimen sliding scale   SubCutaneous every 6 hours  meropenem  IVPB 1000 milliGRAM(s) IV Intermittent every 8 hours  meropenem  IVPB      midodrine 30 milliGRAM(s) Oral every 8 hours  mupirocin 2% Nasal 1 Application(s) Both Nostrils two times a day  norepinephrine Infusion 0.05 MICROgram(s)/kG/Min (9.25 mL/Hr) IV Continuous <Continuous>  pantoprazole Infusion 8 mG/Hr (10 mL/Hr) IV Continuous <Continuous>  phenylephrine    Infusion 0.1 MICROgram(s)/kG/Min (1.85 mL/Hr) IV Continuous <Continuous>  propofol Infusion 20 MICROgram(s)/kG/Min (11.8 mL/Hr) IV Continuous <Continuous>  sodium bicarbonate 1300 milliGRAM(s) Oral three times a day  thiamine Injectable 100 milliGRAM(s) IV Push daily  vancomycin    Solution 125 milliGRAM(s) Oral every 6 hours  vasopressin Infusion 0.04 Unit(s)/Min (6 mL/Hr) IV Continuous <Continuous>    MEDICATIONS  (PRN):    Allergies    No Known Allergies    ICU Vital Signs Last 24 Hrs  T(F): 98 (21 Jul 2024 04:00), Max: 99 (20 Jul 2024 08:00)  HR: 137 (21 Jul 2024 04:30) (100 - 137)  BP: --  BP(mean): --  ABP: 112/54 (21 Jul 2024 04:30) (78/42 - 129/67)  ABP(mean): 78 (21 Jul 2024 04:30) (54 - 94)  RR: 29 (21 Jul 2024 04:30) (18 - 32)  SpO2: 100% (21 Jul 2024 04:30) (97% - 100%)    Physical Exam:   GENERAL: NAD, lying in bed, NG tube in place  HEAD:  Atraumatic, Normocephalic  EYES: EOMI, PERRLA, conjunctiva and sclera clear  CHEST/LUNG: Breath sounds bilaterally. Unlabored respirations  HEART: Less tachycardic, irregular rhythm  ABDOMEN: Bowel sounds present; Soft, Nontender, Slight distension  EXTREMITIES: Bilateral edema of upper and lower extremities, nonpainful to touch, cool extremities distally, cyanotic pointer finger right hand, cyanotic distal L toes  NERVOUS SYSTEM: Speech clear  SKIN: Fullness in right scapular region, no discoloration visible    I&O's Summary    19 Jul 2024 07:01  -  20 Jul 2024 07:00  --------------------------------------------------------  IN: 2987.1 mL / OUT: 1985 mL / NET: 1002.1 mL    20 Jul 2024 07:01  -  21 Jul 2024 04:54  --------------------------------------------------------  IN: 2815.1 mL / OUT: 992 mL / NET: 1823.1 mL        LABS:                        8.1    14.13 )-----------( 103      ( 21 Jul 2024 00:10 )             24.9     07-21    147<H>  |  120<H>  |  52<H>  ----------------------------<  163<H>  3.6   |  14<L>  |  1.40<H>    Ca    7.8<L>      21 Jul 2024 00:10  Phos  4.2     07-21  Mg     1.7     07-21    TPro  4.2<L>  /  Alb  2.1<L>  /  TBili  0.8  /  DBili  x   /  AST  53<H>  /  ALT  38  /  AlkPhos  184<H>  07-21    PT/INR - ( 21 Jul 2024 00:10 )   PT: 11.5 sec;   INR: 1.10 ratio         PTT - ( 21 Jul 2024 00:10 )  PTT:27.1 sec  ABG - ( 21 Jul 2024 00:00 )  pH, Arterial: 7.33  pH, Blood: x     /  pCO2: 30    /  pO2: 136   / HCO3: 16    / Base Excess: -9.2  /  SaO2: 100.0     Urinalysis Basic - ( 21 Jul 2024 00:10 )    Color: x / Appearance: x / SG: x / pH: x  Gluc: 163 mg/dL / Ketone: x  / Bili: x / Urobili: x   Blood: x / Protein: x / Nitrite: x   Leuk Esterase: x / RBC: x / WBC x   Sq Epi: x / Non Sq Epi: x / Bacteria: x      CARDIAC MARKERS ( 21 Jul 2024 00:10 )  x     / x     / 228 U/L / x     / x      CARDIAC MARKERS ( 20 Jul 2024 00:47 )  x     / x     / 137 U/L / x     / x          CAPILLARY BLOOD GLUCOSE      POCT Blood Glucose.: 167 mg/dL (20 Jul 2024 17:08)  POCT Blood Glucose.: 175 mg/dL (20 Jul 2024 12:17)  POCT Blood Glucose.: 107 mg/dL (20 Jul 2024 05:38)      RADIOLOGY & ADDITIONAL TESTS:  Results Reviewed:   Imaging Personally Reviewed:  Electrocardiogram Personally Reviewed: Jodie Okeefe MD PGY-1  ---------------------------------------------------------------------------------------------  Patient is a 78y old  Male who presents with a chief complaint of hypotension (20 Jul 2024 22:17)    HPI:  79 yo male with PMH of CAD s/p PCI x3 with most recent stent 6/12/24 on Plavix, chronic Afib on eliquis, orthostatic hypotension on midodrine, PAD, 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 Patient states since discharge on this past Saturday, he continued to feel ongoing generalized weakness and dizziness with positional changes despite compliance with home midodrine 15mg TID and holding torsemide as instructed. Admits to poor PO intake of fluids/solute due to ongoing issues with poor appetite and nausea with meals which is not new. Denies any fever, chills, chest pain, SOB, cough, abd pain, dysuria nor urinary frequency. Has chronic diarrhea that is unchanged from his baseline.     In the ED, vitals notable for SBP 92-11s. Labs notable for elevated BUN/Cr 31/1.59 (from 30/1.18 on day of discharge 6/29/24). CXR with clear lungs. Abd US with innumerable intrahepatic echogenic masses consistent with known metastatic neuroendocrine tumor. Admitted to medicine for symptomatic hypotension and RAZIA. (02 Jul 2024 18:34)    SUBJECTIVE / OVERNIGHT EVENTS:  Overnight, NG tube placed. Patient had a large melanotic stool. Hgb dropped to 6.6 and received 1 unit pRBC. Vaso was started for a short period of time. Patient was having some SVT runs. Patient given dig 2.5. HRs 110-120s.     Gtt henny 2.5 (double concentrate)  Ivory in place    Vitals: HR 87, , /58, RR 15, Temp 35.8    MEDICATIONS  (STANDING):  aMIOdarone    Tablet 400 milliGRAM(s) Oral every 8 hours  aMIOdarone    Tablet 200 milliGRAM(s) Oral daily  aMIOdarone    Tablet   Oral   atorvastatin 80 milliGRAM(s) Oral at bedtime  chlorhexidine 0.12% Liquid 15 milliLiter(s) Oral Mucosa every 12 hours  chlorhexidine 2% Cloths 1 Application(s) Topical <User Schedule>  dexMEDEtomidine Infusion 0.1 MICROgram(s)/kG/Hr (2.47 mL/Hr) IV Continuous <Continuous>  doxazosin 2 milliGRAM(s) Oral at bedtime  folic acid Injectable 1 milliGRAM(s) IV Push daily  insulin lispro (ADMELOG) corrective regimen sliding scale   SubCutaneous every 6 hours  meropenem  IVPB 1000 milliGRAM(s) IV Intermittent every 8 hours  meropenem  IVPB      midodrine 30 milliGRAM(s) Oral every 8 hours  mupirocin 2% Nasal 1 Application(s) Both Nostrils two times a day  norepinephrine Infusion 0.05 MICROgram(s)/kG/Min (9.25 mL/Hr) IV Continuous <Continuous>  pantoprazole Infusion 8 mG/Hr (10 mL/Hr) IV Continuous <Continuous>  phenylephrine    Infusion 0.1 MICROgram(s)/kG/Min (1.85 mL/Hr) IV Continuous <Continuous>  propofol Infusion 20 MICROgram(s)/kG/Min (11.8 mL/Hr) IV Continuous <Continuous>  sodium bicarbonate 1300 milliGRAM(s) Oral three times a day  thiamine Injectable 100 milliGRAM(s) IV Push daily  vancomycin    Solution 125 milliGRAM(s) Oral every 6 hours  vasopressin Infusion 0.04 Unit(s)/Min (6 mL/Hr) IV Continuous <Continuous>    MEDICATIONS  (PRN):    Allergies    No Known Allergies    ICU Vital Signs Last 24 Hrs  T(F): 98 (21 Jul 2024 04:00), Max: 99 (20 Jul 2024 08:00)  HR: 137 (21 Jul 2024 04:30) (100 - 137)  BP: --  BP(mean): --  ABP: 112/54 (21 Jul 2024 04:30) (78/42 - 129/67)  ABP(mean): 78 (21 Jul 2024 04:30) (54 - 94)  RR: 29 (21 Jul 2024 04:30) (18 - 32)  SpO2: 100% (21 Jul 2024 04:30) (97% - 100%)    Physical Exam:   GENERAL: NAD, lying in bed, NG tube in place  HEAD:  Atraumatic, Normocephalic  EYES: EOMI, PERRLA, conjunctiva and sclera clear  CHEST/LUNG: Breath sounds bilaterally. Unlabored respirations  HEART: Less tachycardic, irregular rhythm  ABDOMEN: Bowel sounds present; Soft, Nontender, Slight distension  EXTREMITIES: Bilateral edema of upper and lower extremities, nonpainful to touch, cool extremities distally, cyanotic pointer finger right hand, cyanotic distal L toes  NERVOUS SYSTEM: Speech clear  SKIN: Fullness in right scapular region, no discoloration visible    I&O's Summary    19 Jul 2024 07:01  -  20 Jul 2024 07:00  --------------------------------------------------------  IN: 2987.1 mL / OUT: 1985 mL / NET: 1002.1 mL    20 Jul 2024 07:01  -  21 Jul 2024 04:54  --------------------------------------------------------  IN: 2815.1 mL / OUT: 992 mL / NET: 1823.1 mL        LABS:                        8.1    14.13 )-----------( 103      ( 21 Jul 2024 00:10 )             24.9     07-21    147<H>  |  120<H>  |  52<H>  ----------------------------<  163<H>  3.6   |  14<L>  |  1.40<H>    Ca    7.8<L>      21 Jul 2024 00:10  Phos  4.2     07-21  Mg     1.7     07-21    TPro  4.2<L>  /  Alb  2.1<L>  /  TBili  0.8  /  DBili  x   /  AST  53<H>  /  ALT  38  /  AlkPhos  184<H>  07-21    PT/INR - ( 21 Jul 2024 00:10 )   PT: 11.5 sec;   INR: 1.10 ratio         PTT - ( 21 Jul 2024 00:10 )  PTT:27.1 sec  ABG - ( 21 Jul 2024 00:00 )  pH, Arterial: 7.33  pH, Blood: x     /  pCO2: 30    /  pO2: 136   / HCO3: 16    / Base Excess: -9.2  /  SaO2: 100.0     Urinalysis Basic - ( 21 Jul 2024 00:10 )    Color: x / Appearance: x / SG: x / pH: x  Gluc: 163 mg/dL / Ketone: x  / Bili: x / Urobili: x   Blood: x / Protein: x / Nitrite: x   Leuk Esterase: x / RBC: x / WBC x   Sq Epi: x / Non Sq Epi: x / Bacteria: x      CARDIAC MARKERS ( 21 Jul 2024 00:10 )  x     / x     / 228 U/L / x     / x      CARDIAC MARKERS ( 20 Jul 2024 00:47 )  x     / x     / 137 U/L / x     / x          CAPILLARY BLOOD GLUCOSE      POCT Blood Glucose.: 167 mg/dL (20 Jul 2024 17:08)  POCT Blood Glucose.: 175 mg/dL (20 Jul 2024 12:17)  POCT Blood Glucose.: 107 mg/dL (20 Jul 2024 05:38)      RADIOLOGY & ADDITIONAL TESTS:  Results Reviewed:   Imaging Personally Reviewed:  Electrocardiogram Personally Reviewed: Jodie Okeefe MD PGY-1  ---------------------------------------------------------------------------------------------  Patient is a 78y old  Male who presents with a chief complaint of hypotension (20 Jul 2024 22:17)    HPI:  77 yo male with PMH of CAD s/p PCI x3 with most recent stent 6/12/24 on Plavix, chronic Afib on eliquis, orthostatic hypotension on midodrine, PAD, 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 Patient states since discharge on this past Saturday, he continued to feel ongoing generalized weakness and dizziness with positional changes despite compliance with home midodrine 15mg TID and holding torsemide as instructed. Admits to poor PO intake of fluids/solute due to ongoing issues with poor appetite and nausea with meals which is not new. Denies any fever, chills, chest pain, SOB, cough, abd pain, dysuria nor urinary frequency. Has chronic diarrhea that is unchanged from his baseline.     In the ED, vitals notable for SBP 92-11s. Labs notable for elevated BUN/Cr 31/1.59 (from 30/1.18 on day of discharge 6/29/24). CXR with clear lungs. Abd US with innumerable intrahepatic echogenic masses consistent with known metastatic neuroendocrine tumor. Admitted to medicine for symptomatic hypotension and RAZIA. (02 Jul 2024 18:34)    SUBJECTIVE / OVERNIGHT EVENTS:  Overnight, NG tube placed. Patient had a large melanotic stool. Hgb dropped to 6.6 and received 1 unit pRBC. Vaso was started for a short period of time. Patient was having some SVT runs. Patient given dig 2.5. HRs 110-120s.     Today, patient examined at bedside. He denies pain or SOB. He has no complaints, but is thirsty.     Gtt henny 2.5 (double concentrate)  Ivory in place    Vitals: HR 87, SpO2 100% RA, /58, RR 15, Temp 35.8    MEDICATIONS  (STANDING):  aMIOdarone    Tablet 400 milliGRAM(s) Oral every 8 hours  aMIOdarone    Tablet 200 milliGRAM(s) Oral daily  aMIOdarone    Tablet   Oral   atorvastatin 80 milliGRAM(s) Oral at bedtime  chlorhexidine 0.12% Liquid 15 milliLiter(s) Oral Mucosa every 12 hours  chlorhexidine 2% Cloths 1 Application(s) Topical <User Schedule>  dexMEDEtomidine Infusion 0.1 MICROgram(s)/kG/Hr (2.47 mL/Hr) IV Continuous <Continuous>  doxazosin 2 milliGRAM(s) Oral at bedtime  folic acid Injectable 1 milliGRAM(s) IV Push daily  insulin lispro (ADMELOG) corrective regimen sliding scale   SubCutaneous every 6 hours  meropenem  IVPB 1000 milliGRAM(s) IV Intermittent every 8 hours  meropenem  IVPB      midodrine 30 milliGRAM(s) Oral every 8 hours  mupirocin 2% Nasal 1 Application(s) Both Nostrils two times a day  norepinephrine Infusion 0.05 MICROgram(s)/kG/Min (9.25 mL/Hr) IV Continuous <Continuous>  pantoprazole Infusion 8 mG/Hr (10 mL/Hr) IV Continuous <Continuous>  phenylephrine    Infusion 0.1 MICROgram(s)/kG/Min (1.85 mL/Hr) IV Continuous <Continuous>  propofol Infusion 20 MICROgram(s)/kG/Min (11.8 mL/Hr) IV Continuous <Continuous>  sodium bicarbonate 1300 milliGRAM(s) Oral three times a day  thiamine Injectable 100 milliGRAM(s) IV Push daily  vancomycin    Solution 125 milliGRAM(s) Oral every 6 hours  vasopressin Infusion 0.04 Unit(s)/Min (6 mL/Hr) IV Continuous <Continuous>    MEDICATIONS  (PRN):    Allergies    No Known Allergies    ICU Vital Signs Last 24 Hrs  T(F): 98 (21 Jul 2024 04:00), Max: 99 (20 Jul 2024 08:00)  HR: 137 (21 Jul 2024 04:30) (100 - 137)  BP: --  BP(mean): --  ABP: 112/54 (21 Jul 2024 04:30) (78/42 - 129/67)  ABP(mean): 78 (21 Jul 2024 04:30) (54 - 94)  RR: 29 (21 Jul 2024 04:30) (18 - 32)  SpO2: 100% (21 Jul 2024 04:30) (97% - 100%)    Physical Exam:   GENERAL: NAD, lying in bed, NG tube in place  HEAD:  Atraumatic, Normocephalic  EYES: EOMI, PERRLA, conjunctiva and sclera clear  CHEST/LUNG: Breath sounds bilaterally. Unlabored respirations  HEART: Less tachycardic, irregular rhythm  ABDOMEN: Bowel sounds present; Soft, Nontender, Slight distension  EXTREMITIES: Bilateral edema of upper and lower extremities, nonpainful to touch, cool extremities distally, cyanotic pointer finger right hand, cyanotic distal L toes  NERVOUS SYSTEM: Speech clear  SKIN: Fullness in right scapular region, no discoloration visible    I&O's Summary    19 Jul 2024 07:01  -  20 Jul 2024 07:00  --------------------------------------------------------  IN: 2987.1 mL / OUT: 1985 mL / NET: 1002.1 mL    20 Jul 2024 07:01  -  21 Jul 2024 04:54  --------------------------------------------------------  IN: 2815.1 mL / OUT: 992 mL / NET: 1823.1 mL        LABS:                        8.1    14.13 )-----------( 103      ( 21 Jul 2024 00:10 )             24.9     07-21    147<H>  |  120<H>  |  52<H>  ----------------------------<  163<H>  3.6   |  14<L>  |  1.40<H>    Ca    7.8<L>      21 Jul 2024 00:10  Phos  4.2     07-21  Mg     1.7     07-21    TPro  4.2<L>  /  Alb  2.1<L>  /  TBili  0.8  /  DBili  x   /  AST  53<H>  /  ALT  38  /  AlkPhos  184<H>  07-21    PT/INR - ( 21 Jul 2024 00:10 )   PT: 11.5 sec;   INR: 1.10 ratio         PTT - ( 21 Jul 2024 00:10 )  PTT:27.1 sec  ABG - ( 21 Jul 2024 00:00 )  pH, Arterial: 7.33  pH, Blood: x     /  pCO2: 30    /  pO2: 136   / HCO3: 16    / Base Excess: -9.2  /  SaO2: 100.0     Urinalysis Basic - ( 21 Jul 2024 00:10 )    Color: x / Appearance: x / SG: x / pH: x  Gluc: 163 mg/dL / Ketone: x  / Bili: x / Urobili: x   Blood: x / Protein: x / Nitrite: x   Leuk Esterase: x / RBC: x / WBC x   Sq Epi: x / Non Sq Epi: x / Bacteria: x      CARDIAC MARKERS ( 21 Jul 2024 00:10 )  x     / x     / 228 U/L / x     / x      CARDIAC MARKERS ( 20 Jul 2024 00:47 )  x     / x     / 137 U/L / x     / x          CAPILLARY BLOOD GLUCOSE      POCT Blood Glucose.: 167 mg/dL (20 Jul 2024 17:08)  POCT Blood Glucose.: 175 mg/dL (20 Jul 2024 12:17)  POCT Blood Glucose.: 107 mg/dL (20 Jul 2024 05:38)      RADIOLOGY & ADDITIONAL TESTS:  Results Reviewed:   Imaging Personally Reviewed:  Electrocardiogram Personally Reviewed: Jodie Okeefe MD PGY-1  ---------------------------------------------------------------------------------------------  Patient is a 78y old  Male who presents with a chief complaint of hypotension (20 Jul 2024 22:17)    HPI:  79 yo male with PMH of CAD s/p PCI x3 with most recent stent 6/12/24 on Plavix, chronic Afib on eliquis, orthostatic hypotension on midodrine, PAD, 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 Patient states since discharge on this past Saturday, he continued to feel ongoing generalized weakness and dizziness with positional changes despite compliance with home midodrine 15mg TID and holding torsemide as instructed. Admits to poor PO intake of fluids/solute due to ongoing issues with poor appetite and nausea with meals which is not new. Denies any fever, chills, chest pain, SOB, cough, abd pain, dysuria nor urinary frequency. Has chronic diarrhea that is unchanged from his baseline.     In the ED, vitals notable for SBP 92-11s. Labs notable for elevated BUN/Cr 31/1.59 (from 30/1.18 on day of discharge 6/29/24). CXR with clear lungs. Abd US with innumerable intrahepatic echogenic masses consistent with known metastatic neuroendocrine tumor. Admitted to medicine for symptomatic hypotension and RAZIA. (02 Jul 2024 18:34)    SUBJECTIVE / OVERNIGHT EVENTS:  Overnight, NG tube placed. Patient had a large melanotic stool. Hgb dropped to 6.6 and received 1 unit pRBC. Vaso was started for a short period of time. Patient was having some SVT runs. Patient given dig 2.5. HRs 110-120s.     Today, patient examined at bedside. He denies pain or SOB. He has no complaints, but continues to request water.    Gtt henny 2.5 (double concentrate)  Ivory in place    Vitals: HR 87, SpO2 100% RA, /58, RR 15, Temp 35.8    MEDICATIONS  (STANDING):  aMIOdarone    Tablet 400 milliGRAM(s) Oral every 8 hours  aMIOdarone    Tablet 200 milliGRAM(s) Oral daily  aMIOdarone    Tablet   Oral   atorvastatin 80 milliGRAM(s) Oral at bedtime  chlorhexidine 0.12% Liquid 15 milliLiter(s) Oral Mucosa every 12 hours  chlorhexidine 2% Cloths 1 Application(s) Topical <User Schedule>  dexMEDEtomidine Infusion 0.1 MICROgram(s)/kG/Hr (2.47 mL/Hr) IV Continuous <Continuous>  doxazosin 2 milliGRAM(s) Oral at bedtime  folic acid Injectable 1 milliGRAM(s) IV Push daily  insulin lispro (ADMELOG) corrective regimen sliding scale   SubCutaneous every 6 hours  meropenem  IVPB 1000 milliGRAM(s) IV Intermittent every 8 hours  meropenem  IVPB      midodrine 30 milliGRAM(s) Oral every 8 hours  mupirocin 2% Nasal 1 Application(s) Both Nostrils two times a day  norepinephrine Infusion 0.05 MICROgram(s)/kG/Min (9.25 mL/Hr) IV Continuous <Continuous>  pantoprazole Infusion 8 mG/Hr (10 mL/Hr) IV Continuous <Continuous>  phenylephrine    Infusion 0.1 MICROgram(s)/kG/Min (1.85 mL/Hr) IV Continuous <Continuous>  propofol Infusion 20 MICROgram(s)/kG/Min (11.8 mL/Hr) IV Continuous <Continuous>  sodium bicarbonate 1300 milliGRAM(s) Oral three times a day  thiamine Injectable 100 milliGRAM(s) IV Push daily  vancomycin    Solution 125 milliGRAM(s) Oral every 6 hours  vasopressin Infusion 0.04 Unit(s)/Min (6 mL/Hr) IV Continuous <Continuous>    MEDICATIONS  (PRN):    Allergies    No Known Allergies    ICU Vital Signs Last 24 Hrs  T(F): 98 (21 Jul 2024 04:00), Max: 99 (20 Jul 2024 08:00)  HR: 137 (21 Jul 2024 04:30) (100 - 137)  BP: --  BP(mean): --  ABP: 112/54 (21 Jul 2024 04:30) (78/42 - 129/67)  ABP(mean): 78 (21 Jul 2024 04:30) (54 - 94)  RR: 29 (21 Jul 2024 04:30) (18 - 32)  SpO2: 100% (21 Jul 2024 04:30) (97% - 100%)    Physical Exam:   GENERAL: NAD, lying in bed, NG tube in place  HEAD:  Atraumatic, Normocephalic  EYES: EOMI, PERRLA, conjunctiva and sclera clear  CHEST/LUNG: Breath sounds bilaterally. Unlabored respirations  HEART: Less tachycardic, irregular rhythm  ABDOMEN: Bowel sounds present; Soft, Nontender, Slight distension  EXTREMITIES: Bilateral edema of upper and lower extremities, nonpainful to touch, cool extremities distally, cyanotic pointer finger right hand, cyanotic distal L toes  NERVOUS SYSTEM: Speech clear  SKIN: Fullness in right scapular region, no discoloration visible    I&O's Summary    19 Jul 2024 07:01  -  20 Jul 2024 07:00  --------------------------------------------------------  IN: 2987.1 mL / OUT: 1985 mL / NET: 1002.1 mL    20 Jul 2024 07:01  -  21 Jul 2024 04:54  --------------------------------------------------------  IN: 2815.1 mL / OUT: 992 mL / NET: 1823.1 mL        LABS:                        8.1    14.13 )-----------( 103      ( 21 Jul 2024 00:10 )             24.9     07-21    147<H>  |  120<H>  |  52<H>  ----------------------------<  163<H>  3.6   |  14<L>  |  1.40<H>    Ca    7.8<L>      21 Jul 2024 00:10  Phos  4.2     07-21  Mg     1.7     07-21    TPro  4.2<L>  /  Alb  2.1<L>  /  TBili  0.8  /  DBili  x   /  AST  53<H>  /  ALT  38  /  AlkPhos  184<H>  07-21    PT/INR - ( 21 Jul 2024 00:10 )   PT: 11.5 sec;   INR: 1.10 ratio         PTT - ( 21 Jul 2024 00:10 )  PTT:27.1 sec  ABG - ( 21 Jul 2024 00:00 )  pH, Arterial: 7.33  pH, Blood: x     /  pCO2: 30    /  pO2: 136   / HCO3: 16    / Base Excess: -9.2  /  SaO2: 100.0     Urinalysis Basic - ( 21 Jul 2024 00:10 )    Color: x / Appearance: x / SG: x / pH: x  Gluc: 163 mg/dL / Ketone: x  / Bili: x / Urobili: x   Blood: x / Protein: x / Nitrite: x   Leuk Esterase: x / RBC: x / WBC x   Sq Epi: x / Non Sq Epi: x / Bacteria: x      CARDIAC MARKERS ( 21 Jul 2024 00:10 )  x     / x     / 228 U/L / x     / x      CARDIAC MARKERS ( 20 Jul 2024 00:47 )  x     / x     / 137 U/L / x     / x          CAPILLARY BLOOD GLUCOSE      POCT Blood Glucose.: 167 mg/dL (20 Jul 2024 17:08)  POCT Blood Glucose.: 175 mg/dL (20 Jul 2024 12:17)  POCT Blood Glucose.: 107 mg/dL (20 Jul 2024 05:38)      RADIOLOGY & ADDITIONAL TESTS:  Results Reviewed:   Imaging Personally Reviewed:  Electrocardiogram Personally Reviewed:

## 2024-07-23 NOTE — PROGRESS NOTE ADULT - ASSESSMENT
79 yo male with PMH of CAD s/p PCI x3 with most recent stent 6/12/24 on Plavix, chronic Afib on eliquis, orthostatic hypotension on midodrine, PAD, 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 Patient states since discharge on this past Saturday, he continued to feel ongoing generalized weakness and dizziness with positional changes despite compliance with home midodrine 15mg TID and holding torsemide as instructed as of day PTA . Admits to poor PO intake of fluids/solute due to ongoing issues with poor appetite and nausea with meals which is not new. In the ED, vitals notable for SBP 92-11s. Labs notable for elevated BUN/Cr 31/1.59 (from 30/1.18 on day of discharge 6/29/24). CXR with clear lungs. Abd US with innumerable intrahepatic echogenic masses consistent with known metastatic neuroendocrine tumor. Admitted to medicine for symptomatic hypotension and RAZIA.      1- CKD III    2-  hypotension   3- hx chf   4- pericarditis   5- C Diff   6- acidosis       RAZIA in setting of hypotension, anemia -> GIB, hemorrhagic shock leading to oliguric ATN  received CT IV contrast, and emergently went to IR for mesenteric embolization 7/9  non oliguric   cr improved to baseline   bp support needed with  midodrine 30 mg tid  and neosynephrine drip  stress dose steroids   amio per icu team  vanco 125 mg q 6 enteral   cont  sodium bicarb 1950  mg tid    hypernatremia trend na holding off on adding free water as high risk for 3rd spacing with significant 3rd space of   fluid   PRBC for anemia   strict I/O  monitor creatinine closely and lytes

## 2024-07-23 NOTE — PROGRESS NOTE ADULT - ASSESSMENT
Assessment	  Assessment: 79 yo male with metastatic neuroendocrine pancreatic cancer (tx on hold) and PMH of CAD s/p PCI x3 with most recent stent 6/12/24 on Plavix and eliquis , chronic Afib on eliquis, orthostatic hypotension on midodrine, PAD , recent admission for dx cath on 6/24/24 presented from PCP's office for hypotension despite taking AM midodrine dose on 7/2, admitted to medicine for symptomatic hypotension and RAZIA. Per chart, on 7/8 PM, pt was noted to have acute onset of melena x5 and coffee ground emesis x2-3. RRT was called on 7/9 AM for hypotension, hgb dropped from 9.2 to 7.4 (admission baseline 10-11), FOBT +, pt was started on PPI IV and transfused 1 unit of pRBC. GI was consulted and underwent EGD on 7/9 afternoon. MICU was consulted for uncontrolled bleeding during EGD. During EGD, pt became hypotensive and was given phenylephrine, had A-fib with RVR s/p amiodarone. Now s/p IR GDA embolization, admitted to MICU for further moniring i.s.o hemorrhagic shock 2/2 GI bleed. On repeat EGD, oozing but no active bleeding was noted. Hospital course c/b c.diff, and patient has been placed on vancomycin. Patient had melanotic stool 7/18 with Hgb drop 10.1 to 7.1 and received 2 units pRBC and 1 unit platelets. Patient was reintubated and underwent EGD 7/18 afternoon. GI found duodenal bleed. Patient now s/p 3x clips and epi. Since 7/18 patient has continued to have some melanotic stools with hemoglobin drops concerning for slow rebleeding.     Plan:     NEUROLOGIC  # Baseline AOx3  Course:  - intubated 7/9  - weaned off propofol onto precedex  - extubated 7/15  - reintubated 7/18 for EGD  - extubated 7/22  Plan:  - on precedex  - fentanyl prn for pain control (last dose 7/12/24)    CARDIOVASCULAR  # hemorrhagic shock   Course:  - Per spouse, pt's baseline BP is a little bit over 100  - 7/9: RRT called 7/9 for hypotension; 2/2 to Upper GI bleeds, urgently took to EGD, found bleeding ulcer that was not well controlled, underwent IR empiric embolization  - 7/13: Restarted plavix for new stent (6/12/24) per GI; on henny, maintain MAP > 65; took off vaso and precedex, he got tachy to 118. Added precedex back.   - 7/14 vasopressin added  - 7/16 Albumin 5%  mL given  - 7/17 Vasopressin stopped  - s/p 5 pRBC prior to MICU, and 4 pRBC, 2 FFP, 2 platelets in MICU (7/17)  - 7/18, patient had large melanotic stool with Hgb drop from 10.1-7.1, patient was restarted on henny, vaso, and levo for reintubation for GI scope; s/p 2pRBC, 1 platelet  - D/c droxidopa 200mg q8 given pressor requirements  - 7/20 1 pRBC  - 7/22 patient only on henny  - Total transfusions: 12pRBC, 3 platelet  Findings:  - AM cortisol 17 (7/5), AM cortisol 37.1 (7/11)  - POCUS ECHO (7/16) showed no cardiogenic shock, no tamponade, low SV indeterminate IVC, irregular B lines but not concerning for pulmonary edema, and some subdiaphragmatic fluid  Plan:  - Continue to monitor CBC  - Transfuse for Hgb <8, (goal given recent stent)  - Continue henny, try to wean (goal MAP >65)  - Continue to hold Plavix 75 mg  - Continue midodrine 30mg q8  - Continue hydrocortisone 50mg q6 for refractory shock    #CAD s/p PCI x3, most recent stent 6/12/24, NURIA to pLAD  Course:  - 7/13 restarted plavix for new stent (6/12/24) per GI   - 7/18 held plavix due to bleeding  - 7/22 started low-dose aspirin as safer alternative to plavix per cardiology  Plan:  - Continue to hold Plavix 75 mg  - Continue atorvastatin 80 mg daily  - Continue aspirin 81 mg daily    #PAD  # A-fib on eliquis  May have been worsened because of GI bleed  - s/p successful DCCV 10/31   - 7/16 In RVR, 2 doses of amio overnight; Cards recommended resuming home dose sotalol 40 mg PO (qTC wnl), s/p 1 dose sotalol but RVR persisted  - 7/16 Amio restarted amio 150 mg followed by loading dose of 1 mg/min for 6 hours and 0.5 mg/min for 18 hours  - 7/17 received amio 150 overnight   - 7/17 Amio load started (amio 400 mg q8 for 12 doses)  - 7/22 Added digoxin load for persistent HR 130s over past week  Plan:  - Hold eliquis   - Continue amio 200 mg daily  - Continue digoxin load (500mcg followed by 250mcg q6)  - Hold home sotalol  - Cardiology following, advises against chemical cardioversion off AC if possible    PULMONARY  #Intubated for airway protection prior to EGD/IR embolization (Resolved)  Course:  - Intubated 7/9  - Extubated 7/15  - Intubated again 7/18  - Extubated 7/22    #Sputum production  - Patient complaining of cough and phlegm after previous intubation  - Patient able to self-suction (7/17)  Plan:  - Airway clearance protocol    RENAL/  #RAZIA   #ATN i.s.o hypotension  #metabolic acidosis   Patient receiving one-time doses of bumex and lasix for diuresis for volume overload. Appears to be volume overloaded but intravascularly depleted.  Findings:  - pH 7.37 (7/16)  - Cr 1.53 (7/16)  - AG 18 (7/17) with high glucose, concern for DKA but ABG pH 7.39, pCO2 26 pO2 87, pHCO3 16, Beta hydroxybutyrate 0 (7/17)  - AG 13 (7/18)  - ABG pH 7.33, pCO2 30, pO2 136, HCO3 16 (7/21)  Plan:  - f/u nephro recs  - Continue sodium bicarb 1950 mg tid, may need a bicarb drip, per nephro  - Per nephro, patient is not a dialysis candidate  - trend BUN/Cr   - monitor Is and Os     #Hypernatremia  Na  Plan:  - hold off on adding free water, per nephro    #Urinary retention  Plan:  - Started Doxazosin 2 mg daily (7/15)  - Monitor I/Os  - Ivory placed by urology 7/19, making urine    GASTROINTESTINAL  #Upper GI Bleed  Course:  - 7/9 CT A/P - Active bleeding in the third portion of the duodenum. Progression of liver metastases.  - 7/9 EGD showed esophagitis, One non-bleeding duodenal ulcer with a clean ulcer base (Arjun Class III). One oozing duodenal ulcer with spurting hemorrhage (Arjun Class Ia). Treatment not successful. Treated with bipolar cautery.  - 7/9 s/p IR GDA embolization  - 7/12 s/p 5 units pRBC prior to MICU and then 4:2:2  - 7/18 Patient had melanotic stool overnight and hemoglobin drop 10.1 to 7.1 in 24 hours; Reached out to surgery in case of recurrent bleeding per GI if unable to find/contain source during re-scope  - 7/18 EGD showing duodenal bleed s/p 3 clips + epi  - Total EGD: 3  - 7/19 Bloody BM likely old blood  - Patient continues to have melatonic stools with Hgb drop  - 7/20 CTA IMPRESSION: "No evidence of GI bleed. Interval endoscopic versus surgical intervention with metallic streak artifact suggestive of surgical clips in the region of the proximal one third portions of the duodenum. Evaluation of the previously seen hematoma is limited secondary to this artifact. Moderate bilateral pleural effusions and associated compressive atelectasis, right greater than left, overall slightly increased from previous CT. Anasarca."  Plan:  - Continue pantoprazole 40 mg IV q12  - monitor Hgb, transfuse as needed for Hgb <8  - hold eliquis   - continue to f/u with GI and IR     - Per GI, "Will need PPI BID for 8 weeks for grade D esophagitis and PUD"    - Per GI, high risk of 30 day rebleed (>10-20% risk)  - No acute IR intervention per IR because no active area of extravasation on imaging (7/21)  - No acute surgical intervention per surgery (7/21)  - No acute GI intervention per GI (7/21)  - f/u gastrin level, per surgery    #Nutrition  Course:  - placed OG tube per GI and started ensure clear liquid (see note from RD 7/13/24)  - OG tube removed during extubation (7/15/24)  -dysphagia screen failed  - FEES (7/17) failed  - ENT consulted for vallecular lesion, diagnosed vallecular cyst, no inpatient intervention required, outpatient f/u  Plan:  - NG tube today    #Diarrhea  Course:  - stopped maintenance fluids   - positive for c. diff (7/14)  - started vancomycin (7/14 -)   - having worsening diarrhea, no melena (7/16)  Plan:  - Finish vancomycin course for active c. diff infection today (7/22)  - Start prophylactic dose (q12) tomorrow (7/24)  - Fluids as necessary to compensate for volume loss 2/2 diarrhea    #Transaminitis (resolving)  Findings:  - Bili 1.1 (7/16) -> 1.3 (7/17)  - Alk P 359 (7/16)-> 484 (7/17)  -  (7/16)->129 (7/17)  - ALT 67 (7/16)-> 72 (7/17)  - RUQ US (7/17): known liver mets, no acute findings  Plan:  - Continue atorvastatin 80 mg daily    INFECTIOUS DISEASE  #leukocytosis  Findings/Course:  - 7/10 BCx - NGTD  - Sputum Cx from ETT 7/13 showed numerous gram neg krishna (Klebsiella oxytoca/raoultella ornithinolytica)  - Started zosyn for empiric treatment (7/11 - 7/18)   - GI panel: previously indeterminate norovirus (7/8), negative (7/13)     - per ID- no intervention needed regarding the PCR results  - c. diff + (7/14)  - started vancomycin for c. diff (7/14- )  - febrile 7/16 overnight  - currently afebrile 7/17  - MRSA Swab: negative  - Staph aureus PCR positive  - Culture resistant to Zosyn (7/18)  - Switched Zosyn to Meropenem (7/18- )  - Patient febrile overnight (7/19)  Plan:  - Continue vancomycin 125 mg q6 for c. diff, on 7/24 switch to q12  - Continue meropenem 1000 mg q8 until 7/25 (7 day course)  - Continue to monitor for fevers    ENDOCRINE  #adrenal insufficiency   Findings:  -7/5 cortisol 17   - 7/11 cortisol 37.1   Plan:  - Continue hydrocortisone 50mg q6  - Monitor sugars with steroid    HEMATOLOGY/ONCOLOGY   # neuroendocrine tumor   - was on lanreotide then had POD in liver and started on peptide receptor radiotherapy (PRRT)- typically given every 8 weeks for 4 doses. He last received it 10/20/2023   - PET 5/28/24 shows new somatostatin avid osseous lesions; decreased intensely avid right supradiaphragmatic and upper abdominal nodes, suspicious for mets  Plan:  - per heme/onc:    - can resume octreotide 150 mcg bid once infection r/o      - can check factor levels V, VIII, X     - "-- f/u with Dr. Sophia Prasad at [facility] [place], no plan for treatment inpatient, if clinically improves/stabilizes then can be considered for treatment outpatient"    #Normocytic anemia   - Pt with low Hgb 7-9  - ISO of GI bleed hx and continued melanotic BMs, there is c/o of rebleed; per GI, high risk of rebleeding, currently seems to be slow bleeding   Plan:  - Surgery and GI made aware & are following   - CBC q12    #DVT ppx  - SCDs  - LE duplex (7/17): negative for DVT    SKINS:   - Lines/Tubes - PIV, cordis replaced with central line (7/14), A line (7/20)    Ethics:   - Full Code   - GOC 7/12, spoke to spouse (Yamilet) over phone with palliative care team, discussed GOC again on 7/16    Consults this admission: GI, Heme Onc, Palliative, Endocrinology, Cardiology, Nephrology, Interventional Radiology, General Surgery   Assessment	  Assessment: 79 yo male with metastatic neuroendocrine pancreatic cancer (tx on hold) and PMH of CAD s/p PCI x3 with most recent stent 6/12/24 on Plavix and eliquis , chronic Afib on eliquis, orthostatic hypotension on midodrine, PAD , recent admission for dx cath on 6/24/24 presented from PCP's office for hypotension despite taking AM midodrine dose on 7/2, admitted to medicine for symptomatic hypotension and RAZIA. Per chart, on 7/8 PM, pt was noted to have acute onset of melena x5 and coffee ground emesis x2-3. RRT was called on 7/9 AM for hypotension, hgb dropped from 9.2 to 7.4 (admission baseline 10-11), FOBT +, pt was started on PPI IV and transfused 1 unit of pRBC. GI was consulted and underwent EGD on 7/9 afternoon. MICU was consulted for uncontrolled bleeding during EGD. During EGD, pt became hypotensive and was given phenylephrine, had A-fib with RVR s/p amiodarone. Now s/p IR GDA embolization, admitted to MICU for further moniring i.s.o hemorrhagic shock 2/2 GI bleed. On repeat EGD, oozing but no active bleeding was noted. Hospital course c/b c.diff, and patient has been placed on vancomycin. Patient had melanotic stool 7/18 with Hgb drop 10.1 to 7.1 and received 2 units pRBC and 1 unit platelets. Patient was reintubated and underwent EGD 7/18 afternoon. GI found duodenal bleed. Patient now s/p 3x clips and epi. Since 7/18 patient has continued to have some melanotic stools with hemoglobin drops concerning for slow rebleeding.     Plan:     NEUROLOGIC  # Baseline AOx3  Course:  - intubated 7/9  - weaned off propofol onto precedex  - extubated 7/15  - reintubated 7/18 for EGD  - extubated 7/22  Plan:  - on precedex  - fentanyl prn for pain control (last dose 7/12/24)    CARDIOVASCULAR  # hemorrhagic shock   Course:  - Per spouse, pt's baseline BP is a little bit over 100  - 7/9: RRT called 7/9 for hypotension; 2/2 to Upper GI bleeds, urgently took to EGD, found bleeding ulcer that was not well controlled, underwent IR empiric embolization  - 7/13: Restarted plavix for new stent (6/12/24) per GI; on henny, maintain MAP > 65; took off vaso and precedex, he got tachy to 118. Added precedex back.   - 7/14 vasopressin added  - 7/16 Albumin 5%  mL given  - 7/17 Vasopressin stopped  - s/p 5 pRBC prior to MICU, and 4 pRBC, 2 FFP, 2 platelets in MICU (7/17)  - 7/18, patient had large melanotic stool with Hgb drop from 10.1-7.1, patient was restarted on henny, vaso, and levo for reintubation for GI scope; s/p 2pRBC, 1 platelet  - D/c droxidopa 200mg q8 given pressor requirements  - 7/20 1 pRBC  - 7/22 patient only on henny  - Total transfusions: 12pRBC, 3 platelet  Findings:  - AM cortisol 17 (7/5), AM cortisol 37.1 (7/11)  - POCUS ECHO (7/16) showed no cardiogenic shock, no tamponade, low SV indeterminate IVC, irregular B lines but not concerning for pulmonary edema, and some subdiaphragmatic fluid  Plan:  - Continue to monitor CBC  - Transfuse for Hgb <8, (goal given recent stent)  - Continue henny, try to wean (goal MAP >65)  - Continue to hold Plavix 75 mg  - Continue midodrine 30mg q8  - Continue hydrocortisone 50mg q6 for refractory shock    #CAD s/p PCI x3, most recent stent 6/12/24, NURIA to pLAD  Course:  - 7/13 restarted plavix for new stent (6/12/24) per GI   - 7/18 held plavix due to bleeding  - 7/22 started low-dose aspirin as safer alternative to plavix per cardiology  Plan:  - Continue to hold Plavix 75 mg  - Continue atorvastatin 80 mg daily  - Continue aspirin 81 mg daily    #PAD  # A-fib on eliquis  May have been worsened because of GI bleed  - s/p successful DCCV 10/31   - 7/16 In RVR, 2 doses of amio overnight; Cards recommended resuming home dose sotalol 40 mg PO (qTC wnl), s/p 1 dose sotalol but RVR persisted  - 7/16 Amio restarted amio 150 mg followed by loading dose of 1 mg/min for 6 hours and 0.5 mg/min for 18 hours  - 7/17 received amio 150 overnight   - 7/17 Amio load started (amio 400 mg q8 for 12 doses)  - 7/22 Added digoxin load for persistent HR 130s over past week  Plan:  - Hold eliquis   - Continue amio 200 mg daily  - Continue digoxin load (500mcg followed by 250mcg q6)  - Hold home sotalol  - Cardiology following, advises against chemical cardioversion off AC if possible    PULMONARY  #Intubated for airway protection prior to EGD/IR embolization (Resolved)  Course:  - Intubated 7/9  - Extubated 7/15  - Intubated again 7/18  - Extubated 7/22    #Sputum production  - Patient complaining of cough and phlegm after previous intubation  - Patient able to self-suction (7/17)  Plan:  - Airway clearance protocol    RENAL/  #RAZIA   #ATN i.s.o hypotension  #metabolic acidosis   Patient receiving one-time doses of bumex and lasix for diuresis for volume overload. Appears to be volume overloaded but intravascularly depleted.  Findings:  - pH 7.37 (7/16)  - Cr 1.53 (7/16)  - AG 18 (7/17) with high glucose, concern for DKA but ABG pH 7.39, pCO2 26 pO2 87, pHCO3 16, Beta hydroxybutyrate 0 (7/17)  - AG 13 (7/18)  - ABG pH 7.33, pCO2 30, pO2 136, HCO3 16 (7/21)  Plan:  - f/u nephro recs  - Continue sodium bicarb 1950 mg tid, may need a bicarb drip, per nephro  - Per nephro, patient is not a dialysis candidate  - trend BUN/Cr   - monitor Is and Os     #Hypernatremia  Na 150 (7/23)  Course:  - 7/22: s/p lasix 40mg and metolazone 5mg   Plan:  - hold off on adding free water, per nephro    #Urinary retention  Plan:  - Started Doxazosin 2 mg daily (7/15)  - Monitor I/Os  - Ivory placed by urology 7/19, making urine    GASTROINTESTINAL  #Upper GI Bleed  Course:  - 7/9 CT A/P - Active bleeding in the third portion of the duodenum. Progression of liver metastases.  - 7/9 EGD showed esophagitis, One non-bleeding duodenal ulcer with a clean ulcer base (Arjun Class III). One oozing duodenal ulcer with spurting hemorrhage (Arjun Class Ia). Treatment not successful. Treated with bipolar cautery.  - 7/9 s/p IR GDA embolization  - 7/12 s/p 5 units pRBC prior to MICU and then 4:2:2  - 7/18 Patient had melanotic stool overnight and hemoglobin drop 10.1 to 7.1 in 24 hours; Reached out to surgery in case of recurrent bleeding per GI if unable to find/contain source during re-scope  - 7/18 EGD showing duodenal bleed s/p 3 clips + epi  - Total EGD: 3  - 7/19 Bloody BM likely old blood  - Patient continues to have melatonic stools with Hgb drop  - 7/20 CTA IMPRESSION: "No evidence of GI bleed. Interval endoscopic versus surgical intervention with metallic streak artifact suggestive of surgical clips in the region of the proximal one third portions of the duodenum. Evaluation of the previously seen hematoma is limited secondary to this artifact. Moderate bilateral pleural effusions and associated compressive atelectasis, right greater than left, overall slightly increased from previous CT. Anasarca."  Plan:  - Continue pantoprazole 40 mg IV q12  - monitor Hgb, transfuse as needed for Hgb <8  - hold eliquis   - continue to f/u with GI and IR     - Per GI, "Will need PPI BID for 8 weeks for grade D esophagitis and PUD"    - Per GI, high risk of 30 day rebleed (>10-20% risk)  - No acute IR intervention per IR because no active area of extravasation on imaging (7/21)  - No acute surgical intervention per surgery (7/21)  - No acute GI intervention per GI (7/21)  - f/u gastrin level, per surgery    #Nutrition  Course:  - placed OG tube per GI and started ensure clear liquid (see note from RD 7/13/24)  - OG tube removed during extubation (7/15/24)  -dysphagia screen failed  - FEES (7/17) failed  - ENT consulted for vallecular lesion, diagnosed vallecular cyst, no inpatient intervention required, outpatient f/u  Plan:  - NG tube today    #Diarrhea  Course:  - stopped maintenance fluids   - positive for c. diff (7/14)  - started vancomycin (7/14 -)   - having worsening diarrhea, no melena (7/16)  Plan:  - Finish vancomycin course for active c. diff infection today (7/22)  - Start prophylactic dose (q12) tomorrow (7/24)  - Fluids as necessary to compensate for volume loss 2/2 diarrhea    #Transaminitis (resolving)  Findings:  - Bili 1.1 (7/16) -> 1.3 (7/17)  - Alk P 359 (7/16)-> 484 (7/17)  -  (7/16)->129 (7/17)  - ALT 67 (7/16)-> 72 (7/17)  - RUQ US (7/17): known liver mets, no acute findings  Plan:  - Continue atorvastatin 80 mg daily    INFECTIOUS DISEASE  #leukocytosis  Findings/Course:  - 7/10 BCx - NGTD  - Sputum Cx from ETT 7/13 showed numerous gram neg krishna (Klebsiella oxytoca/raoultella ornithinolytica)  - Started zosyn for empiric treatment (7/11 - 7/18)   - GI panel: previously indeterminate norovirus (7/8), negative (7/13)     - per ID- no intervention needed regarding the PCR results  - c. diff + (7/14)  - started vancomycin for c. diff (7/14- )  - febrile 7/16 overnight  - currently afebrile 7/17  - MRSA Swab: negative  - Staph aureus PCR positive  - Culture resistant to Zosyn (7/18)  - Switched Zosyn to Meropenem (7/18- )  - Patient febrile overnight (7/19)  Plan:  - Continue vancomycin 125 mg q6 for c. diff, on 7/24 switch to q12  - Continue meropenem 1000 mg q8 until 7/25 (7 day course)  - Continue to monitor for fevers    ENDOCRINE  #adrenal insufficiency   Findings:  -7/5 cortisol 17   - 7/11 cortisol 37.1   Plan:  - Continue hydrocortisone 50mg q6  - Monitor sugars with steroid    HEMATOLOGY/ONCOLOGY   # neuroendocrine tumor   - was on lanreotide then had POD in liver and started on peptide receptor radiotherapy (PRRT)- typically given every 8 weeks for 4 doses. He last received it 10/20/2023   - PET 5/28/24 shows new somatostatin avid osseous lesions; decreased intensely avid right supradiaphragmatic and upper abdominal nodes, suspicious for mets  Plan:  - per heme/onc:    - can resume octreotide 150 mcg bid once infection r/o      - can check factor levels V, VIII, X     - "-- f/u with Dr. Sophia Prasad at [facility] [place] after discharge, no plan for treatment inpatient, if clinically improves/stabilizes then can be considered for treatment outpatient"    #Normocytic anemia   - Pt with low Hgb 7-9  - ISO of GI bleed hx and continued melanotic BMs, there is c/o of rebleed; per GI, high risk of rebleeding, currently seems to be slow bleeding   Plan:  - Surgery and GI made aware & are following   - CBC q12    #DVT ppx  - SCDs  - LE duplex (7/17): negative for DVT    SKINS:   - Lines/Tubes - PIV, cordis replaced with central line (7/14), A line (7/20)    Ethics:   - Full Code   - GOC 7/12, spoke to spouse (Yamilet) over phone with palliative care team, discussed GOC again on 7/16    Consults this admission: GI, Heme Onc, Palliative, Endocrinology, Cardiology, Nephrology, Interventional Radiology, General Surgery   Assessment	  Assessment: 79 yo male with metastatic neuroendocrine pancreatic cancer (tx on hold) and PMH of CAD s/p PCI x3 with most recent stent 6/12/24 on Plavix and eliquis , chronic Afib on eliquis, orthostatic hypotension on midodrine, PAD , recent admission for dx cath on 6/24/24 presented from PCP's office for hypotension despite taking AM midodrine dose on 7/2, admitted to medicine for symptomatic hypotension and RAZIA. Per chart, on 7/8 PM, pt was noted to have acute onset of melena x5 and coffee ground emesis x2-3. RRT was called on 7/9 AM for hypotension, hgb dropped from 9.2 to 7.4 (admission baseline 10-11), FOBT +, pt was started on PPI IV and transfused 1 unit of pRBC. GI was consulted and underwent EGD on 7/9 afternoon. MICU was consulted for uncontrolled bleeding during EGD. During EGD, pt became hypotensive and was given phenylephrine, had A-fib with RVR s/p amiodarone. Now s/p IR GDA embolization, admitted to MICU for further moniring i.s.o hemorrhagic shock 2/2 GI bleed. On repeat EGD, oozing but no active bleeding was noted. Hospital course c/b c.diff, and patient has been placed on vancomycin. Patient had melanotic stool 7/18 with Hgb drop 10.1 to 7.1 and received 2 units pRBC and 1 unit platelets. Patient was reintubated and underwent EGD 7/18 afternoon. GI found duodenal bleed. Patient now s/p 3x clips and epi. Since 7/18 patient has continued to have some melanotic stools with hemoglobin drops concerning for slow rebleeding.     Plan:     NEUROLOGIC  # Baseline AOx3  Course:  - intubated 7/9  - weaned off propofol onto precedex  - extubated 7/15  - reintubated 7/18 for EGD  - extubated 7/22  Plan:  - on precedex  - fentanyl prn for pain control (last dose 7/12/24)    CARDIOVASCULAR  # hemorrhagic shock   Course:  - Per spouse, pt's baseline BP is a little bit over 100  - 7/9: RRT called 7/9 for hypotension; 2/2 to Upper GI bleeds, urgently took to EGD, found bleeding ulcer that was not well controlled, underwent IR empiric embolization  - 7/13: Restarted plavix for new stent (6/12/24) per GI; on henny, maintain MAP > 65; took off vaso and precedex, he got tachy to 118. Added precedex back.   - 7/14 vasopressin added  - 7/16 Albumin 5%  mL given  - 7/17 Vasopressin stopped  - s/p 5 pRBC prior to MICU, and 4 pRBC, 2 FFP, 2 platelets in MICU (7/17)  - 7/18, patient had large melanotic stool with Hgb drop from 10.1-7.1, patient was restarted on henny, vaso, and levo for reintubation for GI scope; s/p 2pRBC, 1 platelet  - D/c droxidopa 200mg q8 given pressor requirements  - 7/20 1 pRBC  - 7/22 patient only on henny  - 7/23 Hgb 6.6, given 1pRBC  - Total transfusions: 13pRBC, 3 platelet  Findings:  - AM cortisol 17 (7/5), AM cortisol 37.1 (7/11)  - POCUS ECHO (7/16) showed no cardiogenic shock, no tamponade, low SV indeterminate IVC, irregular B lines but not concerning for pulmonary edema, and some subdiaphragmatic fluid  Plan:  - Continue to monitor CBC  - Transfuse for Hgb <8, (goal given recent stent)  - Continue henny, try to wean (goal MAP >65)  - Continue to hold Plavix 75 mg  - Continue midodrine 30mg q8  - Continue hydrocortisone 50mg q6 for refractory shock    #CAD s/p PCI x3, most recent stent 6/12/24, NURIA to pLAD  Course:  - 7/13 restarted plavix for new stent (6/12/24) per GI   - 7/18 held plavix due to bleeding  - 7/22 started low-dose aspirin as safer alternative to plavix per cardiology  Plan:  - Continue to hold Plavix 75 mg  - Continue atorvastatin 80 mg daily  - D/c aspirin 81 mg daily given bleed today    #PAD  # A-fib on eliquis  May have been worsened because of GI bleed  - s/p successful DCCV 10/31   - 7/16 In RVR, 2 doses of amio overnight; Cards recommended resuming home dose sotalol 40 mg PO (qTC wnl), s/p 1 dose sotalol but RVR persisted  - 7/16 Amio restarted amio 150 mg followed by loading dose of 1 mg/min for 6 hours and 0.5 mg/min for 18 hours  - 7/17 received amio 150 overnight   - 7/17 Amio load started (amio 400 mg q8 for 12 doses)  - 7/22 Added digoxin load for persistent HR 130s over past week  Plan:  - Hold eliquis   - Continue amio 200 mg daily  - Continue digoxin load (500mcg followed by 250mcg q6)  - Hold home sotalol  - Cardiology following, advises against chemical cardioversion off AC if possible    PULMONARY  #Intubated for airway protection prior to EGD/IR embolization (Resolved)  Course:  - Intubated 7/9  - Extubated 7/15  - Intubated again 7/18  - Extubated 7/22    #Sputum production  - Patient complaining of cough and phlegm after previous intubation  - Patient able to self-suction (7/17)  Plan:  - Airway clearance protocol    RENAL/  #RAZIA   #ATN i.s.o hypotension  #metabolic acidosis   Patient receiving one-time doses of bumex and lasix for diuresis for volume overload. Appears to be volume overloaded but intravascularly depleted.  Findings:  - pH 7.37 (7/16)  - Cr 1.53 (7/16)  - AG 18 (7/17) with high glucose, concern for DKA but ABG pH 7.39, pCO2 26 pO2 87, pHCO3 16, Beta hydroxybutyrate 0 (7/17)  - AG 13 (7/18)  - ABG pH 7.33, pCO2 30, pO2 136, HCO3 16 (7/21)  Plan:  - f/u nephro recs  - Continue sodium bicarb 1950 mg tid, may need a bicarb drip, per nephro  - Per nephro, patient is not a dialysis candidate  - trend BUN/Cr   - monitor Is and Os     #Hypernatremia  Na 150 (7/23)  Course:  - 7/22: s/p lasix 40mg and metolazone 5mg   - FWD: 4.5L  Plan:  - hold off on adding free water, per nephro    #Urinary retention  Plan:  - Started Doxazosin 2 mg daily (7/15)  - Monitor I/Os  - Ivory placed by urology 7/19, making urine    GASTROINTESTINAL  #Upper GI Bleed  Course:  - 7/9 CT A/P - Active bleeding in the third portion of the duodenum. Progression of liver metastases.  - 7/9 EGD showed esophagitis, One non-bleeding duodenal ulcer with a clean ulcer base (Arjun Class III). One oozing duodenal ulcer with spurting hemorrhage (Arjun Class Ia). Treatment not successful. Treated with bipolar cautery.  - 7/9 s/p IR GDA embolization  - 7/12 s/p 5 units pRBC prior to MICU and then 4:2:2  - 7/18 Patient had melanotic stool overnight and hemoglobin drop 10.1 to 7.1 in 24 hours; Reached out to surgery in case of recurrent bleeding per GI if unable to find/contain source during re-scope  - 7/18 EGD showing duodenal bleed s/p 3 clips + epi  - Total EGD: 3  - 7/19 Bloody BM likely old blood  - Patient continues to have melatonic stools with Hgb drop  - 7/20 CTA IMPRESSION: "No evidence of GI bleed. Interval endoscopic versus surgical intervention with metallic streak artifact suggestive of surgical clips in the region of the proximal one third portions of the duodenum. Evaluation of the previously seen hematoma is limited secondary to this artifact. Moderate bilateral pleural effusions and associated compressive atelectasis, right greater than left, overall slightly increased from previous CT. Anasarca."  Plan:  - Continue pantoprazole 40 mg IV q12  - monitor Hgb, transfuse as needed for Hgb <8  - hold eliquis   - continue to f/u with GI and IR     - Per GI, "Will need PPI BID for 8 weeks for grade D esophagitis and PUD"    - Per GI, high risk of 30 day rebleed (>10-20% risk)  - No acute IR intervention per IR because no active area of extravasation on imaging (7/21)  - No acute surgical intervention per surgery (7/21)  - No acute GI intervention per GI (7/21)  - f/u gastrin level, per surgery    #Nutrition  Course:  - placed OG tube per GI and started ensure clear liquid (see note from RD 7/13/24)  - OG tube removed during extubation (7/15/24)  -dysphagia screen failed  - FEES (7/17) failed  - ENT consulted for vallecular lesion, diagnosed vallecular cyst, no inpatient intervention required, outpatient f/u  Plan:  - Feeds through NG tube    #Diarrhea  Course:  - stopped maintenance fluids   - positive for c. diff (7/14)  - started vancomycin (7/14 -)   - having worsening diarrhea, no melena (7/16)  Plan:  - Finish vancomycin course for active c. diff infection today (7/22)  - Start prophylactic dose (q12) tomorrow (7/24)  - Fluids as necessary to compensate for volume loss 2/2 diarrhea    #Transaminitis (resolving)  Findings:  - Bili 1.1 (7/16) -> 1.3 (7/17)  - Alk P 359 (7/16)-> 484 (7/17)  -  (7/16)->129 (7/17)  - ALT 67 (7/16)-> 72 (7/17)  - RUQ US (7/17): known liver mets, no acute findings  Plan:  - Continue atorvastatin 80 mg daily    INFECTIOUS DISEASE  #leukocytosis  Findings/Course:  - 7/10 BCx - NGTD  - Sputum Cx from ETT 7/13 showed numerous gram neg krishna (Klebsiella oxytoca/raoultella ornithinolytica)  - Started zosyn for empiric treatment (7/11 - 7/18)   - GI panel: previously indeterminate norovirus (7/8), negative (7/13)     - per ID- no intervention needed regarding the PCR results  - c. diff + (7/14)  - started vancomycin for c. diff (7/14- )  - febrile 7/16 overnight  - currently afebrile 7/17  - MRSA Swab: negative  - Staph aureus PCR positive  - Culture resistant to Zosyn (7/18)  - Switched Zosyn to Meropenem (7/18- )  - Patient febrile overnight (7/19)  Plan:  - Continue vancomycin 125 mg q6 for c. diff, on 7/24 switch to q12  - Continue meropenem 1000 mg q8 until 7/25 (7 day course)  - Continue to monitor for fevers    ENDOCRINE  #adrenal insufficiency   Findings:  -7/5 cortisol 17   - 7/11 cortisol 37.1   Plan:  - Continue hydrocortisone 50mg q6  - Monitor sugars with steroid    HEMATOLOGY/ONCOLOGY   # neuroendocrine tumor   - was on lanreotide then had POD in liver and started on peptide receptor radiotherapy (PRRT)- typically given every 8 weeks for 4 doses. He last received it 10/20/2023   - PET 5/28/24 shows new somatostatin avid osseous lesions; decreased intensely avid right supradiaphragmatic and upper abdominal nodes, suspicious for mets  Plan:  - per heme/onc:    - can resume octreotide 150 mcg bid once infection r/o      - can check factor levels V, VIII, X     - "-- f/u with Dr. Sophia Prasad at [facility] [place] after discharge, no plan for treatment inpatient, if clinically improves/stabilizes then can be considered for treatment outpatient"    #Normocytic anemia   - Pt with low Hgb 7-9  - ISO of GI bleed hx and continued melanotic BMs, there is c/o of rebleed; per GI, high risk of rebleeding, currently seems to be slow bleeding   Plan:  - Surgery and GI made aware & are following   - CBC q12    #DVT ppx  - SCDs  - LE duplex (7/17): negative for DVT    SKINS:   - Lines/Tubes - PIV, cordis replaced with central line (7/14), A line (7/20)    Ethics:   - Full Code   - GOC 7/12, spoke to spouse (Yamilet) over phone with palliative care team, discussed GOC again on 7/16    Consults this admission: GI, Heme Onc, Palliative, Endocrinology, Cardiology, Nephrology, Interventional Radiology, General Surgery   Assessment	  Assessment: 79 yo male with metastatic neuroendocrine pancreatic cancer (tx on hold) and PMH of CAD s/p PCI x3 with most recent stent 6/12/24 on Plavix and eliquis , chronic Afib on eliquis, orthostatic hypotension on midodrine, PAD , recent admission for dx cath on 6/24/24 presented from PCP's office for hypotension despite taking AM midodrine dose on 7/2, admitted to medicine for symptomatic hypotension and RAZIA. Per chart, on 7/8 PM, pt was noted to have acute onset of melena x5 and coffee ground emesis x2-3. RRT was called on 7/9 AM for hypotension, hgb dropped from 9.2 to 7.4 (admission baseline 10-11), FOBT +, pt was started on PPI IV and transfused 1 unit of pRBC. GI was consulted and underwent EGD on 7/9 afternoon. MICU was consulted for uncontrolled bleeding during EGD. During EGD, pt became hypotensive and was given phenylephrine, had A-fib with RVR s/p amiodarone. Now s/p IR GDA embolization, admitted to MICU for further moniring i.s.o hemorrhagic shock 2/2 GI bleed. On repeat EGD, oozing but no active bleeding was noted. Hospital course c/b c.diff, and patient has been placed on vancomycin. Patient had melanotic stool 7/18 with Hgb drop 10.1 to 7.1 and received 2 units pRBC and 1 unit platelets. Patient was reintubated and underwent EGD 7/18 afternoon. GI found duodenal bleed. Patient now s/p 3x clips and epi. Since 7/18 patient has continued to have some melanotic stools with hemoglobin drops concerning for slow rebleeding.     Plan:     NEUROLOGIC  # Baseline AOx3  Course:  - intubated 7/9  - weaned off propofol onto precedex  - extubated 7/15  - reintubated 7/18 for EGD  - extubated 7/22  Plan:  - on precedex  - fentanyl prn for pain control (last dose 7/12/24)    CARDIOVASCULAR  # hemorrhagic shock   Course:  - Per spouse, pt's baseline BP is a little bit over 100  - 7/9: RRT called 7/9 for hypotension; 2/2 to Upper GI bleeds, urgently took to EGD, found bleeding ulcer that was not well controlled, underwent IR empiric embolization  - 7/13: Restarted plavix for new stent (6/12/24) per GI; on henny, maintain MAP > 65; took off vaso and precedex, he got tachy to 118. Added precedex back.   - 7/14 vasopressin added  - 7/16 Albumin 5%  mL given  - 7/17 Vasopressin stopped  - s/p 5 pRBC prior to MICU, and 4 pRBC, 2 FFP, 2 platelets in MICU (7/17)  - 7/18, patient had large melanotic stool with Hgb drop from 10.1-7.1, patient was restarted on henny, vaso, and levo for reintubation for GI scope; s/p 2pRBC, 1 platelet  - D/c droxidopa 200mg q8 given pressor requirements  - 7/20 1 pRBC  - 7/22 patient only on henny  - 7/23 Hgb 6.6, given 1pRBC  - Total transfusions: 13pRBC, 3 platelet  Findings:  - AM cortisol 17 (7/5), AM cortisol 37.1 (7/11)  - POCUS ECHO (7/16) showed no cardiogenic shock, no tamponade, low SV indeterminate IVC, irregular B lines but not concerning for pulmonary edema, and some subdiaphragmatic fluid  Plan:  - Continue to monitor CBC  - Transfuse for Hgb <8, (goal given recent stent)  - Continue henny, try to wean (goal MAP >65)  - Continue to hold Plavix 75 mg  - Continue midodrine 30mg q8  - Continue hydrocortisone 50mg q6 for refractory shock  - Reach out to surgery regarding cyanotic extremities    #CAD s/p PCI x3, most recent stent 6/12/24, NURIA to pLAD  Course:  - 7/13 restarted plavix for new stent (6/12/24) per GI   - 7/18 held plavix due to bleeding  - 7/22 started low-dose aspirin as safer alternative to plavix per cardiology  Plan:  - Continue to hold Plavix 75 mg  - Continue atorvastatin 80 mg daily  - D/c aspirin 81 mg daily given bleed today    #PAD  # A-fib on eliquis  May have been worsened because of GI bleed  - s/p successful DCCV 10/31   - 7/16 In RVR, 2 doses of amio overnight; Cards recommended resuming home dose sotalol 40 mg PO (qTC wnl), s/p 1 dose sotalol but RVR persisted  - 7/16 Amio restarted amio 150 mg followed by loading dose of 1 mg/min for 6 hours and 0.5 mg/min for 18 hours  - 7/17 received amio 150 overnight   - 7/17 Amio load started (amio 400 mg q8 for 12 doses)  - 7/22 Added digoxin load for persistent HR 130s over past week  Plan:  - Hold eliquis   - Continue amio 200 mg daily  - Continue digoxin load (500mcg followed by 250mcg q6)  - Hold home sotalol  - Cardiology following, advises against chemical cardioversion off AC if possible    PULMONARY  #Intubated for airway protection prior to EGD/IR embolization (Resolved)  Course:  - Intubated 7/9  - Extubated 7/15  - Intubated again 7/18  - Extubated 7/22    #Sputum production  - Patient complaining of cough and phlegm after previous intubation  - Patient able to self-suction (7/17)  Plan:  - Airway clearance protocol    RENAL/  #RAZIA   #ATN i.s.o hypotension  #metabolic acidosis   Patient receiving one-time doses of bumex and lasix for diuresis for volume overload. Appears to be volume overloaded but intravascularly depleted.  Findings:  - pH 7.37 (7/16)  - Cr 1.53 (7/16)  - AG 18 (7/17) with high glucose, concern for DKA but ABG pH 7.39, pCO2 26 pO2 87, pHCO3 16, Beta hydroxybutyrate 0 (7/17)  - AG 13 (7/18)  - ABG pH 7.33, pCO2 30, pO2 136, HCO3 16 (7/21)  Plan:  - f/u nephro recs  - Continue sodium bicarb 1950 mg tid, may need a bicarb drip, per nephro  - Per nephro, patient is not a dialysis candidate  - trend BUN/Cr   - monitor Is and Os     #Hypernatremia  Na 150 (7/23)  Course:  - 7/22: s/p lasix 40mg and metolazone 5mg   - FWD: 4.5L  Plan:  - hold off on adding free water, per nephro    #Urinary retention  Plan:  - Started Doxazosin 2 mg daily (7/15)  - Monitor I/Os  - Ivory placed by urology 7/19, making urine    GASTROINTESTINAL  #Upper GI Bleed  Course:  - 7/9 CT A/P - Active bleeding in the third portion of the duodenum. Progression of liver metastases.  - 7/9 EGD showed esophagitis, One non-bleeding duodenal ulcer with a clean ulcer base (Arjun Class III). One oozing duodenal ulcer with spurting hemorrhage (Arjun Class Ia). Treatment not successful. Treated with bipolar cautery.  - 7/9 s/p IR GDA embolization  - 7/12 s/p 5 units pRBC prior to MICU and then 4:2:2  - 7/18 Patient had melanotic stool overnight and hemoglobin drop 10.1 to 7.1 in 24 hours; Reached out to surgery in case of recurrent bleeding per GI if unable to find/contain source during re-scope  - 7/18 EGD showing duodenal bleed s/p 3 clips + epi  - Total EGD: 3  - 7/19 Bloody BM likely old blood  - Patient continues to have melatonic stools with Hgb drop  - 7/20 CTA IMPRESSION: "No evidence of GI bleed. Interval endoscopic versus surgical intervention with metallic streak artifact suggestive of surgical clips in the region of the proximal one third portions of the duodenum. Evaluation of the previously seen hematoma is limited secondary to this artifact. Moderate bilateral pleural effusions and associated compressive atelectasis, right greater than left, overall slightly increased from previous CT. Anasarca."  Plan:  - Continue pantoprazole 40 mg IV q12  - monitor Hgb, transfuse as needed for Hgb <8  - hold eliquis   - continue to f/u with GI and IR     - Per GI, "Will need PPI BID for 8 weeks for grade D esophagitis and PUD"    - Per GI, high risk of 30 day rebleed (>10-20% risk)  - No acute IR intervention per IR because no active area of extravasation on imaging (7/21)  - No acute surgical intervention per surgery (7/21)  - No acute GI intervention per GI (7/21)  - f/u gastrin level, per surgery    #Nutrition  Course:  - placed OG tube per GI and started ensure clear liquid (see note from RD 7/13/24)  - OG tube removed during extubation (7/15/24)  -dysphagia screen failed  - FEES (7/17) failed  - ENT consulted for vallecular lesion, diagnosed vallecular cyst, no inpatient intervention required, outpatient f/u  Plan:  - Feeds through NG tube    #Diarrhea  Course:  - stopped maintenance fluids   - positive for c. diff (7/14)  - started vancomycin (7/14 -)   - having worsening diarrhea, no melena (7/16)  Plan:  - Finish vancomycin course for active c. diff infection today (7/22)  - Start prophylactic dose (q12) tomorrow (7/24)  - Fluids as necessary to compensate for volume loss 2/2 diarrhea    #Transaminitis (resolving)  Findings:  - Bili 1.1 (7/16) -> 1.3 (7/17)  - Alk P 359 (7/16)-> 484 (7/17)  -  (7/16)->129 (7/17)  - ALT 67 (7/16)-> 72 (7/17)  - RUQ US (7/17): known liver mets, no acute findings  Plan:  - Continue atorvastatin 80 mg daily    INFECTIOUS DISEASE  #leukocytosis  Findings/Course:  - 7/10 BCx - NGTD  - Sputum Cx from ETT 7/13 showed numerous gram neg krishna (Klebsiella oxytoca/raoultella ornithinolytica)  - Started zosyn for empiric treatment (7/11 - 7/18)   - GI panel: previously indeterminate norovirus (7/8), negative (7/13)     - per ID- no intervention needed regarding the PCR results  - c. diff + (7/14)  - started vancomycin for c. diff (7/14- )  - febrile 7/16 overnight  - currently afebrile 7/17  - MRSA Swab: negative  - Staph aureus PCR positive  - Culture resistant to Zosyn (7/18)  - Switched Zosyn to Meropenem (7/18- )  - Patient febrile overnight (7/19)  Plan:  - Continue vancomycin 125 mg q6 for c. diff, on 7/24 switch to q12  - Continue meropenem 1000 mg q8 until 7/25 (7 day course)  - Continue to monitor for fevers    ENDOCRINE  #adrenal insufficiency   Findings:  -7/5 cortisol 17   - 7/11 cortisol 37.1   Plan:  - Continue hydrocortisone 50mg q6  - Monitor sugars with steroid    HEMATOLOGY/ONCOLOGY   # neuroendocrine tumor   - was on lanreotide then had POD in liver and started on peptide receptor radiotherapy (PRRT)- typically given every 8 weeks for 4 doses. He last received it 10/20/2023   - PET 5/28/24 shows new somatostatin avid osseous lesions; decreased intensely avid right supradiaphragmatic and upper abdominal nodes, suspicious for mets  Plan:  - per heme/onc:    - can resume octreotide 150 mcg bid once infection r/o      - can check factor levels V, VIII, X     - "-- f/u with Dr. Sophia Prasad at [facility] [place] after discharge, no plan for treatment inpatient, if clinically improves/stabilizes then can be considered for treatment outpatient"    #Normocytic anemia   - Pt with low Hgb 7-9  - ISO of GI bleed hx and continued melanotic BMs, there is c/o of rebleed; per GI, high risk of rebleeding, currently seems to be slow bleeding   Plan:  - Surgery and GI made aware & are following   - CBC q12    #DVT ppx  - SCDs  - LE duplex (7/17): negative for DVT    SKINS:   - Lines/Tubes - PIV, cordis replaced with central line (7/14), A line (7/20)    Ethics:   - Full Code   - GOC 7/12, spoke to spouse (Yamilet) over phone with palliative care team, discussed GOC again on 7/16    Consults this admission: GI, Heme Onc, Palliative, Endocrinology, Cardiology, Nephrology, Interventional Radiology, General Surgery   Assessment	  Assessment: 77 yo male with metastatic neuroendocrine pancreatic cancer (tx on hold) and PMH of CAD s/p PCI x3 with most recent stent 6/12/24 on Plavix and eliquis , chronic Afib on eliquis, orthostatic hypotension on midodrine, PAD , recent admission for dx cath on 6/24/24 presented from PCP's office for hypotension despite taking AM midodrine dose on 7/2, admitted to medicine for symptomatic hypotension and RAZIA. Per chart, on 7/8 PM, pt was noted to have acute onset of melena x5 and coffee ground emesis x2-3. RRT was called on 7/9 AM for hypotension, hgb dropped from 9.2 to 7.4 (admission baseline 10-11), FOBT +, pt was started on PPI IV and transfused 1 unit of pRBC. GI was consulted and underwent EGD on 7/9 afternoon. MICU was consulted for uncontrolled bleeding during EGD. During EGD, pt became hypotensive and was given phenylephrine, had A-fib with RVR s/p amiodarone. Now s/p IR GDA embolization, admitted to MICU for further moniring i.s.o hemorrhagic shock 2/2 GI bleed. On repeat EGD, oozing but no active bleeding was noted. Hospital course c/b c.diff, and patient has been placed on vancomycin. Patient had melanotic stool 7/18 with Hgb drop 10.1 to 7.1 and received 2 units pRBC and 1 unit platelets. Patient was reintubated and underwent EGD 7/18 afternoon. GI found duodenal bleed. Patient now s/p 3x clips and epi. Since 7/18 patient has continued to have some melanotic stools with hemoglobin drops concerning for slow rebleeding.     Plan:     NEUROLOGIC  # Baseline AOx3  Course:  - intubated 7/9  - weaned off propofol onto precedex  - extubated 7/15  - reintubated 7/18 for EGD  - extubated 7/22  - not sedated    CARDIOVASCULAR  # hemorrhagic shock   Course:  - Per spouse, pt's baseline BP is a little bit over 100  - 7/9: RRT called 7/9 for hypotension; 2/2 to Upper GI bleeds, urgently took to EGD, found bleeding ulcer that was not well controlled, underwent IR empiric embolization  - 7/13: Restarted plavix for new stent (6/12/24) per GI; on henny, maintain MAP > 65; took off vaso and precedex, he got tachy to 118. Added precedex back.   - 7/14 vasopressin added  - 7/16 Albumin 5%  mL given  - 7/17 Vasopressin stopped  - s/p 5 pRBC prior to MICU, and 4 pRBC, 2 FFP, 2 platelets in MICU (7/17)  - 7/18, patient had large melanotic stool with Hgb drop from 10.1-7.1, patient was restarted on henny, vaso, and levo for reintubation for GI scope; s/p 2pRBC, 1 platelet  - D/c droxidopa 200mg q8 given pressor requirements  - 7/20 1 pRBC  - 7/22 patient only on henny  - 7/23 Hgb 6.6, given 1pRBC, did not respond appropriately, ordered 1 pRBC, 1 platelet, 1 FFP  - Total transfusions: 14pRBC, 4 platelet, 1 FFP  Findings:  - AM cortisol 17 (7/5), AM cortisol 37.1 (7/11)  - POCUS ECHO (7/16) showed no cardiogenic shock, no tamponade, low SV indeterminate IVC, irregular B lines but not concerning for pulmonary edema, and some subdiaphragmatic fluid  Plan:  - Continue to monitor CBC  - Transfuse for Hgb <8, (goal given recent stent)  - Restart vaso to try to wean henny, goal MAP >65  - Continue to hold Plavix 75 mg  - Continue midodrine 30mg q8  - Continue hydrocortisone 50mg q6 for refractory shock  - Ordered 1 unit pRBC, 1 unit platelets, 1 unit FFP    #CAD s/p PCI x3, most recent stent 6/12/24, NURIA to pLAD  Course:  - 7/13 restarted plavix for new stent (6/12/24) per GI   - 7/18 held plavix due to bleeding  - 7/22 started low-dose aspirin as safer alternative to plavix per cardiology  Plan:  - Continue to hold Plavix 75 mg  - Continue atorvastatin 80 mg daily  - D/c aspirin 81 mg daily given bleed today    #PAD  # A-fib on eliquis  May have been worsened because of GI bleed  - s/p successful DCCV 10/31   - 7/16 In RVR, 2 doses of amio overnight; Cards recommended resuming home dose sotalol 40 mg PO (qTC wnl), s/p 1 dose sotalol but RVR persisted  - 7/16 Amio restarted amio 150 mg followed by loading dose of 1 mg/min for 6 hours and 0.5 mg/min for 18 hours  - 7/17 received amio 150 overnight   - 7/17 Amio load started (amio 400 mg q8 for 12 doses)  - 7/22 Added digoxin load for persistent HR 130s over past week  Plan:  - Hold eliquis   - Continue amio 200 mg daily  - Continue digoxin load (500mcg followed by 250mcg q6)  - Hold home sotalol  - Cardiology following, advises against chemical cardioversion off AC if possible    PULMONARY  #Intubated for airway protection prior to EGD/IR embolization (Resolved)  Course:  - Intubated 7/9  - Extubated 7/15  - Intubated again 7/18  - Extubated 7/22    #Sputum production  - Patient complaining of cough and phlegm after previous intubation  - Patient able to self-suction (7/17)  Plan:  - Airway clearance protocol    RENAL/  #RAZIA   #ATN i.s.o hypotension  #metabolic acidosis   Patient receiving one-time doses of bumex and lasix for diuresis for volume overload. Appears to be volume overloaded but intravascularly depleted.  Findings:  - pH 7.37 (7/16)  - Cr 1.53 (7/16)  - AG 18 (7/17) with high glucose, concern for DKA but ABG pH 7.39, pCO2 26 pO2 87, pHCO3 16, Beta hydroxybutyrate 0 (7/17)  - AG 13 (7/18)  - ABG pH 7.33, pCO2 30, pO2 136, HCO3 16 (7/21)  Plan:  - f/u nephro recs  - Continue sodium bicarb 1950 mg tid, may need a bicarb drip, per nephro, but will not start bicarb drip now  - Per nephro, patient is not a dialysis candidate  - trend BUN/Cr   - monitor Is and Os     #Hypernatremia  Na 150 (7/23)  Course:  - 7/22: s/p lasix 40mg and metolazone 5mg   - FWD: 4.5L  Plan:  - hold off on adding free water, per nephro    #Urinary retention  Plan:  - Started Doxazosin 2 mg daily (7/15)  - Monitor I/Os  - Ivory placed by urology 7/19, making urine    GASTROINTESTINAL  #Upper GI Bleed  Course:  - 7/9 CT A/P - Active bleeding in the third portion of the duodenum. Progression of liver metastases.  - 7/9 EGD showed esophagitis, One non-bleeding duodenal ulcer with a clean ulcer base (Arjun Class III). One oozing duodenal ulcer with spurting hemorrhage (Arjun Class Ia). Treatment not successful. Treated with bipolar cautery.  - 7/9 s/p IR GDA embolization  - 7/12 s/p 5 units pRBC prior to MICU and then 4:2:2  - 7/18 Patient had melanotic stool overnight and hemoglobin drop 10.1 to 7.1 in 24 hours; Reached out to surgery in case of recurrent bleeding per GI if unable to find/contain source during re-scope  - 7/18 EGD showing duodenal bleed s/p 3 clips + epi  - Total EGD: 3  - 7/19 Bloody BM likely old blood  - Patient continues to have melatonic stools with Hgb drop  - 7/20 CTA IMPRESSION: "No evidence of GI bleed. Interval endoscopic versus surgical intervention with metallic streak artifact suggestive of surgical clips in the region of the proximal one third portions of the duodenum. Evaluation of the previously seen hematoma is limited secondary to this artifact. Moderate bilateral pleural effusions and associated compressive atelectasis, right greater than left, overall slightly increased from previous CT. Anasarca."  Plan:  - Continue pantoprazole 40 mg IV q12  - monitor Hgb, transfuse as needed for Hgb <8  - hold eliquis   - continue to f/u with GI and IR     - Per GI, "Will need PPI BID for 8 weeks for grade D esophagitis and PUD"    - Per GI, high risk of 30 day rebleed (>10-20% risk)  - No acute IR intervention per IR because no active area of extravasation on imaging (7/21)  - No acute surgical intervention per surgery (7/21)  - No acute GI intervention per GI (7/21)  - f/u gastrin level, per surgery    #Nutrition  Course:  - placed OG tube per GI and started ensure clear liquid (see note from RD 7/13/24)  - OG tube removed during extubation (7/15/24)  -dysphagia screen failed  - FEES (7/17) failed  - ENT consulted for vallecular lesion, diagnosed vallecular cyst, no inpatient intervention required, outpatient f/u  Plan:  - Feeds through NG tube    #Diarrhea  Course:  - stopped maintenance fluids   - positive for c. diff (7/14)  - started vancomycin (7/14 -)   - having worsening diarrhea, no melena (7/16)  Plan:  - Finish vancomycin course for active c. diff infection today (7/22)  - Start prophylactic dose (q12) tomorrow (7/24)  - Fluids as necessary to compensate for volume loss 2/2 diarrhea    #Transaminitis (resolving)  Findings:  - Bili 1.1 (7/16) -> 1.3 (7/17)  - Alk P 359 (7/16)-> 484 (7/17)  -  (7/16)->129 (7/17)  - ALT 67 (7/16)-> 72 (7/17)  - RUQ US (7/17): known liver mets, no acute findings  Plan:  - Continue atorvastatin 80 mg daily    INFECTIOUS DISEASE  #leukocytosis  Findings/Course:  - 7/10 BCx - NGTD  - Sputum Cx from ETT 7/13 showed numerous gram neg krishna (Klebsiella oxytoca/raoultella ornithinolytica)  - Started zosyn for empiric treatment (7/11 - 7/18)   - GI panel: previously indeterminate norovirus (7/8), negative (7/13)     - per ID- no intervention needed regarding the PCR results  - c. diff + (7/14)  - started vancomycin for c. diff (7/14- )  - febrile 7/16 overnight  - currently afebrile 7/17  - MRSA Swab: negative  - Staph aureus PCR positive  - Culture resistant to Zosyn (7/18)  - Switched Zosyn to Meropenem (7/18- )  - Patient febrile overnight (7/19)  Plan:  - Continue vancomycin 125 mg q6 for c. diff, on 7/24 switch to q12  - Continue meropenem 1000 mg q8 until 7/25 (7 day course)  - Continue to monitor for fevers    ENDOCRINE  #adrenal insufficiency   Findings:  -7/5 cortisol 17   - 7/11 cortisol 37.1   Plan:  - Continue hydrocortisone 50mg q6  - Monitor sugars with steroid    HEMATOLOGY/ONCOLOGY   # neuroendocrine tumor   - was on lanreotide then had POD in liver and started on peptide receptor radiotherapy (PRRT)- typically given every 8 weeks for 4 doses. He last received it 10/20/2023   - PET 5/28/24 shows new somatostatin avid osseous lesions; decreased intensely avid right supradiaphragmatic and upper abdominal nodes, suspicious for mets  Plan:  - per heme/onc:    - can resume octreotide 150 mcg bid once infection r/o      - can check factor levels V, VIII, X     - "-- f/u with Dr. Sophia Prasad at [facility] [place] after discharge, no plan for treatment inpatient, if clinically improves/stabilizes then can be considered for treatment outpatient"    #Normocytic anemia   - Pt with low Hgb 7-9  - ISO of GI bleed hx and continued melanotic BMs, there is c/o of rebleed; per GI, high risk of rebleeding, currently seems to be slow bleeding   Plan:  - Surgery and GI made aware & are following   - CBC q12    #DVT ppx  - SCDs  - LE duplex (7/17): negative for DVT    SKINS:   - Lines/Tubes - PIV, cordis replaced with central line (7/14), A line (7/20)    Ethics:   - Full Code   - GOC 7/12, spoke to spouse (Yamilet) over phone with palliative care team, discussed GOC again on 7/16    Consults this admission: GI, Heme Onc, Palliative, Endocrinology, Cardiology, Nephrology, Interventional Radiology, General Surgery   Assessment	  Assessment: 77 yo male with metastatic neuroendocrine pancreatic cancer (tx on hold) and PMH of CAD s/p PCI x3 with most recent stent 6/12/24 on Plavix and eliquis , chronic Afib on eliquis, orthostatic hypotension on midodrine, PAD , recent admission for dx cath on 6/24/24 presented from PCP's office for hypotension despite taking AM midodrine dose on 7/2, admitted to medicine for symptomatic hypotension and RAZIA. Per chart, on 7/8 PM, pt was noted to have acute onset of melena x5 and coffee ground emesis x2-3. RRT was called on 7/9 AM for hypotension, hgb dropped from 9.2 to 7.4 (admission baseline 10-11), FOBT +, pt was started on PPI IV and transfused 1 unit of pRBC. GI was consulted and underwent EGD on 7/9 afternoon. MICU was consulted for uncontrolled bleeding during EGD. During EGD, pt became hypotensive and was given phenylephrine, had A-fib with RVR s/p amiodarone. Now s/p IR GDA embolization, admitted to MICU for further moniring i.s.o hemorrhagic shock 2/2 GI bleed. On repeat EGD, oozing but no active bleeding was noted. Hospital course c/b c.diff, and patient has been placed on vancomycin. Patient had melanotic stool 7/18 with Hgb drop 10.1 to 7.1 and received 2 units pRBC and 1 unit platelets. Patient was reintubated and underwent EGD 7/18 afternoon. GI found duodenal bleed. Patient now s/p 3x clips and epi. Since 7/18 patient has continued to have some melanotic stools with hemoglobin drops concerning for slow rebleeding.     Plan:     NEUROLOGIC  # Baseline AOx3  Course:  - intubated 7/9  - weaned off propofol onto precedex  - extubated 7/15  - reintubated 7/18 for EGD  - extubated 7/22  - not sedated    CARDIOVASCULAR  # hemorrhagic shock   Course:  - Per spouse, pt's baseline BP is a little bit over 100  - 7/9: RRT called 7/9 for hypotension; 2/2 to Upper GI bleeds, urgently took to EGD, found bleeding ulcer that was not well controlled, underwent IR empiric embolization  - 7/13: Restarted plavix for new stent (6/12/24) per GI; on henny, maintain MAP > 65; took off vaso and precedex, he got tachy to 118. Added precedex back.   - 7/14 vasopressin added  - 7/16 Albumin 5%  mL given  - 7/17 Vasopressin stopped  - s/p 5 pRBC prior to MICU, and 4 pRBC, 2 FFP, 2 platelets in MICU (7/17)  - 7/18, patient had large melanotic stool with Hgb drop from 10.1-7.1, patient was restarted on henny, vaso, and levo for reintubation for GI scope; s/p 2pRBC, 1 platelet  - D/c droxidopa 200mg q8 given pressor requirements  - 7/20 1 pRBC  - 7/22 patient only on henny  - 7/23 Hgb 6.6, given 1pRBC, did not respond appropriately, ordered 1 pRBC, 1 platelet, 1 FFP  - Total transfusions: 14pRBC, 4 platelet, 1 FFP  Findings:  - AM cortisol 17 (7/5), AM cortisol 37.1 (7/11)  - POCUS ECHO (7/16) showed no cardiogenic shock, no tamponade, low SV indeterminate IVC, irregular B lines but not concerning for pulmonary edema, and some subdiaphragmatic fluid  Plan:  - Continue to monitor CBC  - Transfuse for Hgb <8, (goal given recent stent)  - Restart vaso to try to wean henny, goal MAP >65  - Continue to hold Plavix 75 mg  - Continue midodrine 30mg q8  - Continue hydrocortisone 50mg q6 for refractory shock  - Ordered 1 unit pRBC, 1 unit platelets, 1 unit FFP  - Ordered 1g calcium gluconate IV    #CAD s/p PCI x3, most recent stent 6/12/24, NURIA to pLAD  Course:  - 7/13 restarted plavix for new stent (6/12/24) per GI   - 7/18 held plavix due to bleeding  - 7/22 started low-dose aspirin as safer alternative to plavix per cardiology  Plan:  - Continue to hold Plavix 75 mg  - Continue atorvastatin 80 mg daily  - D/c aspirin 81 mg daily given bleed today    #PAD  # A-fib on eliquis  May have been worsened because of GI bleed  - s/p successful DCCV 10/31   - 7/16 In RVR, 2 doses of amio overnight; Cards recommended resuming home dose sotalol 40 mg PO (qTC wnl), s/p 1 dose sotalol but RVR persisted  - 7/16 Amio restarted amio 150 mg followed by loading dose of 1 mg/min for 6 hours and 0.5 mg/min for 18 hours  - 7/17 received amio 150 overnight   - 7/17 Amio load started (amio 400 mg q8 for 12 doses)  - 7/22 Added digoxin load (500mcg followed by 250mcg q6) for persistent HR 130s over past week  Plan:  - Hold eliquis   - Continue amio 200 mg daily  - Check digoxin level tomorrow morning to determine continuation of digoxin  - Hold home sotalol  - Cardiology following, advises against chemical cardioversion off AC if possible    PULMONARY  #Intubated for airway protection prior to EGD/IR embolization (Resolved)  Course:  - Intubated 7/9  - Extubated 7/15  - Intubated again 7/18  - Extubated 7/22    #Sputum production  - Patient complaining of cough and phlegm after previous intubation  - Patient able to self-suction (7/17)  Plan:  - Airway clearance protocol    RENAL/  #RAZIA   #ATN i.s.o hypotension  #metabolic acidosis   Patient receiving one-time doses of bumex and lasix for diuresis for volume overload. Appears to be volume overloaded but intravascularly depleted.  Findings:  - pH 7.37 (7/16)  - Cr 1.53 (7/16)  - AG 18 (7/17) with high glucose, concern for DKA but ABG pH 7.39, pCO2 26 pO2 87, pHCO3 16, Beta hydroxybutyrate 0 (7/17)  - AG 13 (7/18)  - ABG pH 7.33, pCO2 30, pO2 136, HCO3 16 (7/21)  Plan:  - f/u nephro recs  - Continue sodium bicarb 1950 mg tid, may need a bicarb drip, per nephro, but will not start bicarb drip now  - Per nephro, patient is not a dialysis candidate  - trend BUN/Cr   - monitor Is and Os     #Hypernatremia  Na 150 (7/23)  Course:  - 7/22: s/p lasix 40mg and metolazone 5mg   - FWD: 4.5L  Plan:  - Continue free water    #Urinary retention  Plan:  - Started Doxazosin 2 mg daily (7/15)  - Monitor I/Os  - Ivory placed by urology 7/19, making urine    GASTROINTESTINAL  #Upper GI Bleed  Course:  - 7/9 CT A/P - Active bleeding in the third portion of the duodenum. Progression of liver metastases.  - 7/9 EGD showed esophagitis, One non-bleeding duodenal ulcer with a clean ulcer base (Arjun Class III). One oozing duodenal ulcer with spurting hemorrhage (Arjun Class Ia). Treatment not successful. Treated with bipolar cautery.  - 7/9 s/p IR GDA embolization  - 7/12 s/p 5 units pRBC prior to MICU and then 4:2:2  - 7/18 Patient had melanotic stool overnight and hemoglobin drop 10.1 to 7.1 in 24 hours; Reached out to surgery in case of recurrent bleeding per GI if unable to find/contain source during re-scope  - 7/18 EGD showing duodenal bleed s/p 3 clips + epi  - Total EGD: 3  - 7/19 Bloody BM likely old blood  - Patient continues to have melatonic stools with Hgb drop  - 7/20 CTA IMPRESSION: "No evidence of GI bleed. Interval endoscopic versus surgical intervention with metallic streak artifact suggestive of surgical clips in the region of the proximal one third portions of the duodenum. Evaluation of the previously seen hematoma is limited secondary to this artifact. Moderate bilateral pleural effusions and associated compressive atelectasis, right greater than left, overall slightly increased from previous CT. Anasarca."  Plan:  - Continue pantoprazole 40 mg IV q12  - monitor Hgb, transfuse as needed for Hgb <8  - hold eliquis   - continue to f/u with GI and IR     - Per GI, "Will need PPI BID for 8 weeks for grade D esophagitis and PUD"    - Per GI, high risk of 30 day rebleed (>10-20% risk)  - No acute IR intervention per IR because no active area of extravasation on imaging (7/21)  - No acute GI intervention per GI (7/21)  - Surgery evaluated patient today, may be a candidate for duodenotomy to oversew ulcers, but patient cannot be on aspirin  - As patient is s/p aspirin x 1 dose (7/23), will continue to update surgery and reach out in a couple days to re-evaluate  - f/u gastrin level, per surgery  - Check Helicobacter pylori antigen    #Nutrition  Course:  - placed OG tube per GI and started ensure clear liquid (see note from RD 7/13/24)  - OG tube removed during extubation (7/15/24)  -dysphagia screen failed  - FEES (7/17) failed  - ENT consulted for vallecular lesion, diagnosed vallecular cyst, no inpatient intervention required, outpatient f/u  Plan:  - Feeds through NG tube    #Diarrhea  Course:  - stopped maintenance fluids   - positive for c. diff (7/14)  - started vancomycin (7/14 -)   - having worsening diarrhea, no melena (7/16)  Plan:  - Finish vancomycin course for active c. diff infection today (7/22)  - Start prophylactic dose (q12) tomorrow (7/24)  - Fluids as necessary to compensate for volume loss 2/2 diarrhea    #Transaminitis (resolving)  Findings:  - Bili 1.1 (7/16) -> 1.3 (7/17)  - Alk P 359 (7/16)-> 484 (7/17)  -  (7/16)->129 (7/17)  - ALT 67 (7/16)-> 72 (7/17)  - RUQ US (7/17): known liver mets, no acute findings  Plan:  - Continue atorvastatin 80 mg daily    INFECTIOUS DISEASE  #leukocytosis  Findings/Course:  - 7/10 BCx - NGTD  - Sputum Cx from ETT 7/13 showed numerous gram neg krishna (Klebsiella oxytoca/raoultella ornithinolytica)  - Started zosyn for empiric treatment (7/11 - 7/18)   - GI panel: previously indeterminate norovirus (7/8), negative (7/13)     - per ID- no intervention needed regarding the PCR results  - c. diff + (7/14)  - started vancomycin for c. diff (7/14- )  - febrile 7/16 overnight  - currently afebrile 7/17  - MRSA Swab: negative  - Staph aureus PCR positive  - Culture resistant to Zosyn (7/18)  - Switched Zosyn to Meropenem (7/18- )  - Patient febrile overnight (7/19)  Plan:  - Continue vancomycin 125 mg q6 for c. diff, on 7/24 switch to q12  - Continue meropenem 1000 mg q8 until 7/25 (7 day course)  - Continue to monitor for fevers    ENDOCRINE  #adrenal insufficiency   Findings:  -7/5 cortisol 17   - 7/11 cortisol 37.1   Plan:  - Continue hydrocortisone 50mg q6  - Monitor sugars with steroid    HEMATOLOGY/ONCOLOGY   # neuroendocrine tumor   - was on lanreotide then had POD in liver and started on peptide receptor radiotherapy (PRRT)- typically given every 8 weeks for 4 doses. He last received it 10/20/2023   - PET 5/28/24 shows new somatostatin avid osseous lesions; decreased intensely avid right supradiaphragmatic and upper abdominal nodes, suspicious for mets  Plan:  - per heme/onc:    - can resume octreotide 150 mcg bid once infection r/o      - can check factor levels V, VIII, X     - "-- f[place] [facility] [place], no plan for treatment inpatient, if clinically improves/stabilizes then can be considered for treatment outpatient"    #Normocytic anemia   - Pt with low Hgb 7-9  - ISO of GI bleed hx and continued melanotic BMs, there is c/o of rebleed; per GI, high risk of rebleeding, currently seems to be slow bleeding   Plan:  - Surgery and GI made aware & are following   - CBC q6    #DVT ppx  - SCDs  - LE duplex (7/17): negative for DVT    SKINS:   - Lines/Tubes - PIV, cordis replaced with central line (7/14), A line (7/20)  Plan:  - Central line to be replaced today    Ethics:   - Full Code   - GOC 7/12, spoke to spouse (Yamilet) over phone with palliative care team, discussed GOC again on 7/16    Consults this admission: GI, Heme Onc, Palliative, Endocrinology, Cardiology, Nephrology, Interventional Radiology, General Surgery   Assessment	  Assessment: 77 yo male with metastatic neuroendocrine pancreatic cancer (tx on hold) and PMH of CAD s/p PCI x3 with most recent stent 6/12/24 on Plavix and eliquis , chronic Afib on eliquis, orthostatic hypotension on midodrine, PAD , recent admission for dx cath on 6/24/24 presented from PCP's office for hypotension despite taking AM midodrine dose on 7/2, admitted to medicine for symptomatic hypotension and RAZIA. Per chart, on 7/8 PM, pt was noted to have acute onset of melena x5 and coffee ground emesis x2-3. RRT was called on 7/9 AM for hypotension, hgb dropped from 9.2 to 7.4 (admission baseline 10-11), FOBT +, pt was started on PPI IV and transfused 1 unit of pRBC. GI was consulted and underwent EGD on 7/9 afternoon. MICU was consulted for uncontrolled bleeding during EGD. During EGD, pt became hypotensive and was given phenylephrine, had A-fib with RVR s/p amiodarone. Now s/p IR GDA embolization, admitted to MICU for further moniring i.s.o hemorrhagic shock 2/2 GI bleed. On repeat EGD, oozing but no active bleeding was noted. Hospital course c/b c.diff, and patient has been placed on vancomycin. Patient had melanotic stool 7/18 with Hgb drop 10.1 to 7.1 and received 2 units pRBC and 1 unit platelets. Patient was reintubated and underwent EGD 7/18 afternoon. GI found duodenal bleed. Patient now s/p 3x clips and epi. Since 7/18 patient has continued to have some melanotic stools with hemoglobin drops concerning for slow rebleeding.     Plan:     NEUROLOGIC  # Baseline AOx3  Course:  - intubated 7/9  - weaned off propofol onto precedex  - extubated 7/15  - reintubated 7/18 for EGD  - extubated 7/22  - not sedated    CARDIOVASCULAR  # hemorrhagic shock   Course:  - Per spouse, pt's baseline BP is a little bit over 100  - 7/9: RRT called 7/9 for hypotension; 2/2 to Upper GI bleeds, urgently took to EGD, found bleeding ulcer that was not well controlled, underwent IR empiric embolization  - 7/13: Restarted plavix for new stent (6/12/24) per GI; on henny, maintain MAP > 65; took off vaso and precedex, he got tachy to 118. Added precedex back.   - 7/14 vasopressin added  - 7/16 Albumin 5%  mL given  - 7/17 Vasopressin stopped  - s/p 5 pRBC prior to MICU, and 4 pRBC, 2 FFP, 2 platelets in MICU (7/17)  - 7/18, patient had large melanotic stool with Hgb drop from 10.1-7.1, patient was restarted on henny, vaso, and levo for reintubation for GI scope; s/p 2pRBC, 1 platelet  - D/c droxidopa 200mg q8 given pressor requirements  - 7/20 1 pRBC  - 7/22 patient only on henny  - 7/23 Hgb 6.6, given 1pRBC, did not respond appropriately, ordered 1 pRBC, 1 platelet, 1 FFP  - Total transfusions: 14pRBC, 4 platelet, 1 FFP  Findings:  - AM cortisol 17 (7/5), AM cortisol 37.1 (7/11)  - POCUS ECHO (7/16) showed no cardiogenic shock, no tamponade, low SV indeterminate IVC, irregular B lines but not concerning for pulmonary edema, and some subdiaphragmatic fluid  Plan:  - Continue to monitor CBC  - Transfuse for Hgb <8, (goal given recent stent)  - Restart vaso to try to wean henny, goal MAP >65  - Continue to hold Plavix 75 mg  - Continue midodrine 30mg q8  - Continue hydrocortisone 50mg q6 for refractory shock  - Ordered 1 unit pRBC, 1 unit platelets, 1 unit FFP  - Ordered 1g calcium gluconate IV    #CAD s/p PCI x3, most recent stent 6/12/24, NURIA to pLAD  Course:  - 7/13 restarted plavix for new stent (6/12/24) per GI   - 7/18 held plavix due to bleeding  - 7/22 started low-dose aspirin as safer alternative to plavix per cardiology  Plan:  - Continue to hold Plavix 75 mg  - Continue atorvastatin 80 mg daily  - D/c aspirin 81 mg daily given bleed today    #PAD  # A-fib on eliquis  May have been worsened because of GI bleed  - s/p successful DCCV 10/31   - 7/16 In RVR, 2 doses of amio overnight; Cards recommended resuming home dose sotalol 40 mg PO (qTC wnl), s/p 1 dose sotalol but RVR persisted  - 7/16 Amio restarted amio 150 mg followed by loading dose of 1 mg/min for 6 hours and 0.5 mg/min for 18 hours  - 7/17 received amio 150 overnight   - 7/17 Amio load started (amio 400 mg q8 for 12 doses)  - 7/22 Added digoxin load (500mcg followed by 250mcg q6) for persistent HR 130s over past week  Plan:  - Hold eliquis   - Continue amio 200 mg daily  - Check digoxin level tomorrow morning to determine continuation of digoxin  - Hold home sotalol  - Cardiology following, advises against chemical cardioversion off AC if possible    PULMONARY  #Intubated for airway protection prior to EGD/IR embolization (Resolved)  Course:  - Intubated 7/9  - Extubated 7/15  - Intubated again 7/18  - Extubated 7/22    #Sputum production  - Patient complaining of cough and phlegm after previous intubation  - Patient able to self-suction (7/17)  Plan:  - Airway clearance protocol    RENAL/  #RAZIA   #ATN i.s.o hypotension  #metabolic acidosis   Patient receiving one-time doses of bumex and lasix for diuresis for volume overload. Appears to be volume overloaded but intravascularly depleted.  Findings:  - pH 7.37 (7/16)  - Cr 1.53 (7/16)  - AG 18 (7/17) with high glucose, concern for DKA but ABG pH 7.39, pCO2 26 pO2 87, pHCO3 16, Beta hydroxybutyrate 0 (7/17)  - AG 13 (7/18)  - ABG pH 7.33, pCO2 30, pO2 136, HCO3 16 (7/21)  Plan:  - f/u nephro recs  - Continue sodium bicarb 1950 mg tid, may need a bicarb drip, per nephro, but will not start bicarb drip now  - Per nephro, patient is not a dialysis candidate  - trend BUN/Cr   - monitor Is and Os     #Hypernatremia  Na 150 (7/23)  Course:  - 7/22: s/p lasix 40mg and metolazone 5mg   - FWD: 4.5L  Plan:  - Continue free water  - Will not diurese today    #Urinary retention  Plan:  - Started Doxazosin 2 mg daily (7/15)  - Monitor I/Os  - Ivory placed by urology 7/19, making urine    GASTROINTESTINAL  #Upper GI Bleed  Course:  - 7/9 CT A/P - Active bleeding in the third portion of the duodenum. Progression of liver metastases.  - 7/9 EGD showed esophagitis, One non-bleeding duodenal ulcer with a clean ulcer base (Arjun Class III). One oozing duodenal ulcer with spurting hemorrhage (Arjun Class Ia). Treatment not successful. Treated with bipolar cautery.  - 7/9 s/p IR GDA embolization  - 7/12 s/p 5 units pRBC prior to MICU and then 4:2:2  - 7/18 Patient had melanotic stool overnight and hemoglobin drop 10.1 to 7.1 in 24 hours; Reached out to surgery in case of recurrent bleeding per GI if unable to find/contain source during re-scope  - 7/18 EGD showing duodenal bleed s/p 3 clips + epi  - Total EGD: 3  - 7/19 Bloody BM likely old blood  - Patient continues to have melatonic stools with Hgb drop  - 7/20 CTA IMPRESSION: "No evidence of GI bleed. Interval endoscopic versus surgical intervention with metallic streak artifact suggestive of surgical clips in the region of the proximal one third portions of the duodenum. Evaluation of the previously seen hematoma is limited secondary to this artifact. Moderate bilateral pleural effusions and associated compressive atelectasis, right greater than left, overall slightly increased from previous CT. Anasarca."  Plan:  - Continue pantoprazole 40 mg IV q12  - monitor Hgb, transfuse as needed for Hgb <8  - hold eliquis   - continue to f/u with GI and IR     - Per GI, "Will need PPI BID for 8 weeks for grade D esophagitis and PUD"    - Per GI, high risk of 30 day rebleed (>10-20% risk)  - No acute IR intervention per IR because no active area of extravasation on imaging (7/21)  - No acute GI intervention per GI (7/21)  - Surgery evaluated patient today, may be a candidate for duodenotomy to oversew ulcers, but patient cannot be on aspirin  - As patient is s/p aspirin x 1 dose (7/23), will continue to update surgery and reach out in a couple days to re-evaluate  - f/u gastrin level, per surgery  - Check Helicobacter pylori antigen    #Nutrition  Course:  - placed OG tube per GI and started ensure clear liquid (see note from RD 7/13/24)  - OG tube removed during extubation (7/15/24)  -dysphagia screen failed  - FEES (7/17) failed  - ENT consulted for vallecular lesion, diagnosed vallecular cyst, no inpatient intervention required, outpatient f/u  Plan:  - NPO today  - Reassess for tube feeds tomorrow  - When ready to start PO diet, speech & swallow evaluation (do not have to do nurse dysphagia screen first)    #Diarrhea  Course:  - stopped maintenance fluids   - positive for c. diff (7/14)  - started vancomycin (7/14 -)   - having worsening diarrhea, no melena (7/16)  Plan:  - Finish vancomycin course for active c. diff infection today (7/22)  - Start prophylactic dose (q12) tomorrow (7/24)  - Fluids as necessary to compensate for volume loss 2/2 diarrhea    #Transaminitis (resolving)  Findings:  - Bili 1.1 (7/16) -> 1.3 (7/17)  - Alk P 359 (7/16)-> 484 (7/17)  -  (7/16)->129 (7/17)  - ALT 67 (7/16)-> 72 (7/17)  - RUQ US (7/17): known liver mets, no acute findings  Plan:  - Continue atorvastatin 80 mg daily    INFECTIOUS DISEASE  #leukocytosis  Findings/Course:  - 7/10 BCx - NGTD  - Sputum Cx from ETT 7/13 showed numerous gram neg krishna (Klebsiella oxytoca/raoultella ornithinolytica)  - Started zosyn for empiric treatment (7/11 - 7/18)   - GI panel: previously indeterminate norovirus (7/8), negative (7/13)     - per ID- no intervention needed regarding the PCR results  - c. diff + (7/14)  - started vancomycin for c. diff (7/14- )  - febrile 7/16 overnight  - currently afebrile 7/17  - MRSA Swab: negative  - Staph aureus PCR positive  - Culture resistant to Zosyn (7/18)  - Switched Zosyn to Meropenem (7/18- )  - Patient febrile overnight (7/19)  Plan:  - Continue vancomycin 125 mg q6 for c. diff, on 7/24 switch to q12  - Continue meropenem 1000 mg q8 until 7/25 (7 day course)  - Continue to monitor for fevers    ENDOCRINE  #adrenal insufficiency   Findings:  -7/5 cortisol 17   - 7/11 cortisol 37.1   Plan:  - Continue hydrocortisone 50mg q6  - Monitor sugars with steroid    HEMATOLOGY/ONCOLOGY   # neuroendocrine tumor   - was on lanreotide then had POD in liver and started on peptide receptor radiotherapy (PRRT)- typically given every 8 weeks for 4 doses. He last received it 10/20/2023   - PET 5/28/24 shows new somatostatin avid osseous lesions; decreased intensely avid right supradiaphragmatic and upper abdominal nodes, suspicious for mets  Plan:  - per heme/onc:    - can resume octreotide 150 mcg bid once infection r/o      - can check factor levels V, VIII, X     - "-- f/u with Dr. Sophia Prasad at [facility] [place] after discharge, no plan for treatment inpatient, if clinically improves/stabilizes then can be considered for treatment outpatient"    #Normocytic anemia   - Pt with low Hgb 7-9  - ISO of GI bleed hx and continued melanotic BMs, there is c/o of rebleed; per GI, high risk of rebleeding, currently seems to be slow bleeding   Plan:  - Surgery and GI made aware & are following   - CBC q6    #DVT ppx  - SCDs  - LE duplex (7/17): negative for DVT    SKINS:   - Lines/Tubes - PIV, cordis replaced with central line (7/14), A line (7/20)  Plan:  - Central line to be replaced today    Ethics:   - Full Code   - GOC 7/12, spoke to spouse (Yamilet) over phone with palliative care team, discussed GOC again on 7/16    Consults this admission: GI, Heme Onc, Palliative, Endocrinology, Cardiology, Nephrology, Interventional Radiology, General Surgery

## 2024-07-23 NOTE — PROGRESS NOTE ADULT - ASSESSMENT
79 yo male with PMH of CAD s/p PCI x3 with most recent stent 6/12/24 on Plavix, chronic Afib on eliquis, orthostatic hypotension on midodrine, PAD, neuroendocrine tumor with RT currently on hold, recent admission for dx cath on 6/24/24 who presented for hypotension, admitted for GIB and hemorrhagic shock. Found to be bleeding from duodenum. Transferred to MICU, on pressors.     1. Pancreatic NET- metastatic   -- was on lanreotide then had POD in liver and started on peptide receptor radiotherapy (PRRT)- typically given every 8 weeks for 4 doses. He received one dose on 10/20/2023 and planned to get his 2nd dose at the end of December however his course was complicated by multiple hospitalizations that were noncancer related (decompensated heart failure).   -- 5/28/24 PET Dotatate (compared to 9/2023): several new somatostatin avid osseous lesions, some faintly sclerotic, suspicious for mets. Increased upper RP nodes, probably metastatic. Decreased intensely tracer avid right supradiaphragmatic and upper abdominal nodes, suspicious for metastasis. Redemonstrated intensely somatostatin avid bilobar hepatic lesions, consistent with metastases again morphologically difficult to delineate.   -- CT reviewed by [facility]: SINCE MAY 8 2024 INCREASED HEPATIC METASTASES, NEWLY SEEN PRIMARY TUMOR IN THE PANCREAS, and active bleeding within the duodenum with associated intraluminal hematoma.   -- chromogranin level is elevated at 2058, but no prior level at [facility] [place] for review   -- f/u with Dr. Sophia Prasad at [facility] [place] after discharge, no plan for treatment inpatient, if clinically improves/stabilizes then can be considered for treatment outpatient    2. Duodenal bleed, Hemorrhagic shock  - Remains in MICU, s/p extubation 7/22. NG tube in place.  - CT w/ bleed in duodenum. GI called, EGD 7/9 -> S/p  IR embolization 7/9, intubated in MICU -> s/p extubation 7/15 -> intubated 7/18  - s/p multiple transfusions, fibrinogen low would recommend Cry for < 100, but coags have improved as are essentially normal now so unlikely, probably was related to liver dysfunction.   - Once infection ruled out, may start octreotide 150 mcg BID  - s/p repeat EGD 7/12 showing duodenal lesions w/clots and oozing, no clear targets for intervention and no active bleeding, purastat applied to all areas of oozing.   - S/p EGD 7/18: duodenal ulcer with active oozing, ulcer with visible vessel. Bleeding treated.   - S/p 2 units pRBC 7/23, platelets and plasma  - Per GI, high risk for rebleed, will continue PPI.   - Repeat CT AP w/ No evidence of GI bleed.  Interval endoscopic versus surgical intervention with metallic streak artifact suggestive of surgical clips in the region of the proximal one third portions of the duodenum. Evaluation of the previously seen hematoma is limited secondary to this artifact. Moderate bilateral pleural effusions and associated compressive atelectasis, right greater than left, overall slightly increased from previous CT. Anasarca.  - Per IR, In the absences of any active extravasation on CTA there's no place to target for an embolization  - Surgery monitoring for further bleeding. If significant recurrence of UGIB, may consider emergent open duodenotomy   - MICU managing antibiotics, on meropenem given fevers.     3. C. diff diarrhea  - on oral vanco    Hugo Topete PA-C  Hematology/Oncology  [facility]  [phone] (office)

## 2024-07-23 NOTE — PROCEDURE NOTE - NSPROCDETAILS_GEN_ALL_CORE
guidewire recovered/lumen(s) aspirated and flushed/sterile dressing applied/sterile technique, catheter placed/ultrasound guidance with use of sterile gel and probe cove
patient pre-oxygenated, tube inserted, placement confirmed
location identified, draped/prepped, sterile technique used, needle inserted/introduced/positive blood return obtained via catheter/connected to a pressurized flush line/sutured in place/hemostasis with direct pressure, dressing applied/Seldinger technique/all materials/supplies accounted for at end of procedure
guidewire recovered/lumen(s) aspirated and flushed/sterile dressing applied/sterile technique, catheter placed/ultrasound guidance with use of sterile gel and probe cove
sterile technique, indwelling urinary device inserted/a urinary catheter insertion kit was used for all insertion materials

## 2024-07-23 NOTE — PROGRESS NOTE ADULT - NS ATTEND AMEND GEN_ALL_CORE FT
continue transfusion support and management per MICU  continue otherwise current plan per the above note

## 2024-07-23 NOTE — PROGRESS NOTE ADULT - SUBJECTIVE AND OBJECTIVE BOX
Patient is a 78y old  Male who presents with a chief complaint of hypotension (23 Jul 2024 04:21)    Pt seen and examined s/p extubation. Awake and oriented.  MEDICATIONS  (STANDING):  aMIOdarone    Tablet   Oral   aMIOdarone    Tablet 200 milliGRAM(s) Oral daily  atorvastatin 80 milliGRAM(s) Oral at bedtime  chlorhexidine 2% Cloths 1 Application(s) Topical <User Schedule>  dexMEDEtomidine Infusion 0.5 MICROgram(s)/kG/Hr (15.7 mL/Hr) IV Continuous <Continuous>  doxazosin 2 milliGRAM(s) Oral at bedtime  folic acid Injectable 1 milliGRAM(s) IV Push daily  hydrocortisone sodium succinate Injectable 50 milliGRAM(s) IV Push every 6 hours  insulin lispro (ADMELOG) corrective regimen sliding scale   SubCutaneous every 6 hours  meropenem  IVPB 1000 milliGRAM(s) IV Intermittent every 8 hours  meropenem  IVPB      midodrine 30 milliGRAM(s) Oral every 8 hours  pantoprazole  Injectable 40 milliGRAM(s) IV Push every 12 hours  phenylephrine    Infusion 0.1 MICROgram(s)/kG/Min (1.85 mL/Hr) IV Continuous <Continuous>  sodium bicarbonate 1950 milliGRAM(s) Oral three times a day  thiamine Injectable 100 milliGRAM(s) IV Push daily  vancomycin    Solution 125 milliGRAM(s) Oral every 6 hours  vasopressin Infusion 0.04 Unit(s)/Min (6 mL/Hr) IV Continuous <Continuous>    MEDICATIONS  (PRN):    Vital Signs Last 24 Hrs  T(C): 36.4 (23 Jul 2024 08:00), Max: 37 (22 Jul 2024 20:00)  T(F): 97.5 (23 Jul 2024 08:00), Max: 98.6 (22 Jul 2024 20:00)  HR: 92 (23 Jul 2024 11:00) (82 - 145)  BP: --  BP(mean): --  RR: 12 (23 Jul 2024 11:00) (11 - 34)  SpO2: 100% (23 Jul 2024 11:00) (77% - 100%)    Parameters below as of 23 Jul 2024 08:00  Patient On (Oxygen Delivery Method): room air    PE  NAD  Awake, alert  Anicteric, MMM  NG tube  No c/c/e  No rash grossly                          6.9    10.84 )-----------( 107      ( 23 Jul 2024 08:00 )             21.2     07-23    150<H>  |  123<H>  |  50<H>  ----------------------------<  158<H>  3.7   |  17<L>  |  1.34<H>    Ca    7.7<L>      23 Jul 2024 00:43  Phos  3.5     07-23  Mg     2.1     07-23    TPro  4.2<L>  /  Alb  2.1<L>  /  TBili  0.6  /  DBili  x   /  AST  73<H>  /  ALT  37  /  AlkPhos  222<H>  07-23

## 2024-07-23 NOTE — PROGRESS NOTE ADULT - SUBJECTIVE AND OBJECTIVE BOX
required 2u RBC transfusion this morning for recurrent melena overnight after ASA was given last night    on phenylephrine @ 0.5 mcg/kg/min  on vasopressin @ 0.04 units/min  on PPI BID    SpO2 = 100% on room air  soft / mild right-sided tenderness / ND / obese  LLQ scar from MARIA C regalado      hgb - 6.6 -> 1u RBC (7/23 0400) -> 6.9 -> 1u RBC (7/23 0945) -> ?  plat - 107  lactate - 0.8

## 2024-07-23 NOTE — CHART NOTE - NSCHARTNOTEFT_GEN_A_CORE
NUTRITION FOLLOW UP NOTE    PATIENT SEEN FOR: length of stay follow up.     SOURCE: [] Patient  [x] Current Medical Record  [x] RN  [] Family/support person at bedside  [x] Patient unavailable/inappropriate  [x] Other: Interdisciplinary Rounds     CHART REVIEWED/EVENTS NOTED.  [] No changes to nutrition care plan to note  [x] Nutrition Status:  - Extubated 7/15  - Hemorrhagic shock. Pressors: Phenylephrine at 0.5 micrograms/kG/min and Vasopressin at 0.04 units/min   - RAZIA  - Seen by SLP  with recommendation, "NPO, with non-oral nutrition/hydration/medications." Pending FEES today.   - Upper GI Bleed s/p 5 units pRBC prior to MICU admission   - Metastatic neuroendocrine pancreatic cancer     DIET ORDER:   Diet, NPO with Tube Feed:   Tube Feeding Modality: Nasogastric  Ensure Clear  Total Volume for 24 Hours (mL): 1170  Continuous  Starting Tube Feed Rate {mL per Hour}: 10  Increase Tube Feed Rate by (mL): 10     Every 6 hours  Until Goal Tube Feed Rate (mL per Hour): 65  Tube Feed Duration (in Hours): 18  Tube Feed Start Time: 11:00  Tube Feed Stop Time: 05:00 (24 @ 02:54)    CURRENT DIET ORDER IS:  [] Appropriate:  [] Inadequate:  [x] Other: See recommendations below     NUTRITION INTAKE/PROVISION:  [] PO:   [x] Enteral Nutrition:  Order Provides: 1170 ml formula, 1185 kcals, 39.5 gm protein.   Current Pump Rate: On hold for MICU 18 hr feeds. Was at 65 mL/hr  5-Day Average Enteral Provision per RN flowsheet: 440 mL, 446 kcals, 15 g protein   [] Parenteral Nutrition:    ANTHROPOMETRICS:  Drug Dosing Weight  Height (cm): 188 (2024 13:27)  Weight (kg): 98.7 (2024 13:27)  BMI (kg/m2): 27.9 (2024 13:27)    Weights:   Daily: None to address.   Pt being worked on by provider, RD unable to obtain wt.     ANTHROPOMETRICS:  Drug Dosing Weight  Height (cm): 188 (2024 13:27)  Weight (kg): 125.6 (2024 00:00)  BMI (kg/m2): 35.5 (2024 00:00)  BSA (m2): 2.5 (2024 00:00)  Weights:   Daily Weight in k.3 (), Weight in k.6 (), Weight in k.1 ()     NUTRITIONALLY PERTINENT MEDICATIONS:  MEDICATIONS  (STANDING):  aMIOdarone    Tablet 200 milliGRAM(s) Oral daily  aMIOdarone    Tablet   Oral   atorvastatin 80 milliGRAM(s) Oral at bedtime  calcium gluconate IVPB 1 Gram(s) IV Intermittent once  doxazosin 2 milliGRAM(s) Oral at bedtime  folic acid Injectable 1 milliGRAM(s) IV Push daily  hydrocortisone sodium succinate Injectable 50 milliGRAM(s) IV Push every 6 hours  insulin lispro (ADMELOG) corrective regimen sliding scale   SubCutaneous every 6 hours  meropenem  IVPB 1000 milliGRAM(s) IV Intermittent every 8 hours  meropenem  IVPB      midodrine 30 milliGRAM(s) Oral every 8 hours  pantoprazole  Injectable 40 milliGRAM(s) IV Push every 12 hours  phenylephrine    Infusion 0.1 MICROgram(s)/kG/Min (1.85 mL/Hr) IV Continuous <Continuous>  sodium bicarbonate 1950 milliGRAM(s) Oral three times a day  thiamine Injectable 100 milliGRAM(s) IV Push daily  vancomycin    Solution 125 milliGRAM(s) Oral every 6 hours  vasopressin Infusion 0.04 Unit(s)/Min (6 mL/Hr) IV Continuous <Continuous>    NUTRITIONALLY PERTINENT LABS:   Na150 mmol/L<H> Glu 158 mg/dL<H> K+ 3.7 mmol/L Cr  1.34 mg/dL<H> BUN 50 mg/dL<H>    Phos 3.5 mg/dL    Alb 2.1 g/dL<L> ALT 37 U/L AST 73 U/L<H> Alkaline Phosphatase 222 U/L<H>  07-15-24 @ 09:38 a1c 6.4    A1C with Estimated Average Glucose Result: 6.4 % (07-15-24 @ 09:38)    Finger Sticks:  POCT Blood Glucose.: 166 mg/dL ( @ 06:03)  POCT Blood Glucose.: 198 mg/dL ( @ 06:01)  POCT Blood Glucose.: 136 mg/dL ( @ 17:33)  POCT Blood Glucose.: 148 mg/dL ( @ 11:26)    NUTRITIONALLY PERTINENT MEDICATIONS/LABS:  [x] Reviewed  [x] Relevant notes on medications/labs:  - Ordered for thiamine, folic acid, magnesium sulfate, and potassium chloride   - On antibiotics   - Elevated BG; sliding scale of insulin     EDEMA:  [x] Reviewed: +1/+2/+3 generalized  [] Relevant notes:    GI/ I&O:  [x] Reviewed  [x] Relevant notes: Last BM 7/17 x1 thus far   [x] Other: Extensive GI Hx, see note.     SKIN:   [x] No pressure injuries documented, per nursing flowsheet  [] Pressure injury previously noted  [] Change in pressure injury documentation:  [] Other:    ESTIMATED NEEDS:  Based on dosing wt 98.7 kg   Energy: (23-28 kcal/kg) 2270 - 2764 kcal/day  Protein: (1.2-1.4 g/kg) 118 - 138 g/day   Fluid needs deferred to provider.     NUTRITION DIAGNOSIS:  [x] Prior Dx: 1) Severe chronic malnutrition 2) Increased Nutrient Needs  [] New Dx:    EDUCATION:  [] Yes:  [x] Not appropriate/warranted    NUTRITION CARE PLAN:  1. Diet: Vital 1.5 at 30 mL/hr increasing only as tolerated and electrolytes WNL to goal rate 85 mL/hr x18 hours with ProSource TF Free x1 daily to provide total volume 1530 mL, 2385 kcals, 118 gm protein, and 1169 mL free water. Meets 24 kcals/kG and 1.2 gm protein/kG based on dosing wt 98.7 kG.   2. Multivitamin/mineral supplementation: As medically feasible, continue to provide thiamine and consider multivitamin for refeeding risk.     [] Achieved - Continue current nutrition intervention(s)  [] Current medical condition precludes nutrition intervention at this time.    MONITORING AND EVALUATION:   RD remains available upon request and will follow up per protocol.    NUTRITION FOLLOW UP NOTE    PATIENT SEEN FOR:    SOURCE: [] Patient  [x] Current Medical Record  [] RN  [] Family/support person at bedside  [] Patient unavailable/inappropriate  [x] Other: IDR    CHART REVIEWED/EVENTS NOTED.  [] No changes to nutrition care plan to note  [] Nutrition Status:    DIET ORDER:   Diet, NPO:   Except Medications (24)      CURRENT DIET ORDER IS:  [] Appropriate:  [] Inadequate:  [x] Other: See recommendations below    NUTRITION INTAKE/PROVISION:  [] PO:  [] Enteral Nutrition:  [] Parenteral Nutrition:          NUTRITIONALLY PERTINENT MEDICATIONS/LABS:  [x] Reviewed  [x] Relevant notes on medications/labs:    EDEMA:  [x] Reviewed  [] Relevant notes:    GI/ I&O:  [x] Reviewed  [] Relevant notes:  [] Other:    SKIN:   [] No pressure injuries documented, per nursing flowsheet  [] Pressure injury previously noted  [] Change in pressure injury documentation:  [] Other:    ESTIMATED NEEDS:  Based on   Energy: (xx-xx kcals/kg)  kcal/day   Protein: (xx-xx g/kg)  g/day  Fluid needs deferred to provider  Timothy State Equation:     NUTRITION DIAGNOSIS:  [] Prior Dx:  [] New Dx:    EDUCATION:  [] Yes:  [] Not appropriate/warranted    NUTRITION CARE PLAN:  1. Diet:  2. Supplements:  3. Multivitamin/mineral supplementation:  4:     [] Achieved - Continue current nutrition intervention(s)  [] Current medical condition precludes nutrition intervention at this time.    MONITORING AND EVALUATION:   STEFANI remains available upon request and will follow up per protocol.    [facility] NUTRITION FOLLOW UP NOTE    PATIENT SEEN FOR: length of stay follow up.     SOURCE: [] Patient  [x] Current Medical Record  [x] RN  [] Family/support person at bedside  [x] Patient unavailable/inappropriate  [x] Other: Interdisciplinary Rounds     CHART REVIEWED/EVENTS NOTED.  [] No changes to nutrition care plan to note  [x] Nutrition Status:  - Extubated   - Hemorrhagic shock. Pressors: Phenylephrine at 1.7 micrograms/kG/min and Vasopressin at 0.04 units/min   - RAZIA  - Seen by SLP  with recommendation, "NPO, with non-oral nutrition/hydration/medications."   - Active c. diff infection   - Metastatic neuroendocrine pancreatic cancer     DIET ORDER:   Diet, NPO:   Except Medications (24 @ 16:40)    CURRENT DIET ORDER IS:  [] Appropriate:  [] Inadequate:  [x] Other: See recommendations below     NUTRITION INTAKE/PROVISION:  On 6th day NPO  [] PO:   [] Enteral Nutrition:  [] Parenteral Nutrition:    ANTHROPOMETRICS:  Drug Dosing Weight  Height (cm): 188 (2024 13:27)  Weight (kg): 125.6 (2024 00:00); previously 98.7 kg ()  BMI (kg/m2): 35.5 (2024 00:00)    Weights:   Daily Weight in k.3 (-), 125.6 (-), 120.1 ()   Increase in wt likely fluid retention. RD will continue to trend as new wts available/able.     NUTRITIONALLY PERTINENT MEDICATIONS:  MEDICATIONS  (STANDING):  aMIOdarone    Tablet 200 milliGRAM(s) Oral daily  aMIOdarone    Tablet   Oral   atorvastatin 80 milliGRAM(s) Oral at bedtime  calcium gluconate IVPB 1 Gram(s) IV Intermittent once  doxazosin 2 milliGRAM(s) Oral at bedtime  folic acid Injectable 1 milliGRAM(s) IV Push daily  hydrocortisone sodium succinate Injectable 50 milliGRAM(s) IV Push every 6 hours  insulin lispro (ADMELOG) corrective regimen sliding scale   SubCutaneous every 6 hours  meropenem  IVPB 1000 milliGRAM(s) IV Intermittent every 8 hours  meropenem  IVPB      midodrine 30 milliGRAM(s) Oral every 8 hours  pantoprazole  Injectable 40 milliGRAM(s) IV Push every 12 hours  phenylephrine    Infusion 0.1 MICROgram(s)/kG/Min (1.85 mL/Hr) IV Continuous <Continuous>  sodium bicarbonate 1950 milliGRAM(s) Oral three times a day  thiamine Injectable 100 milliGRAM(s) IV Push daily  vancomycin    Solution 125 milliGRAM(s) Oral every 6 hours  vasopressin Infusion 0.04 Unit(s)/Min (6 mL/Hr) IV Continuous <Continuous>    NUTRITIONALLY PERTINENT LABS:   Na150 mmol/L<H> Glu 158 mg/dL<H> K+ 3.7 mmol/L Cr  1.34 mg/dL<H> BUN 50 mg/dL<H>    Phos 3.5 mg/dL    Alb 2.1 g/dL<L> ALT 37 U/L AST 73 U/L<H> Alkaline Phosphatase 222 U/L<H>  07-15-24 @ 09:38 a1c 6.4    A1C with Estimated Average Glucose Result: 6.4 % (07-15-24 @ 09:38)    Finger Sticks:  POCT Blood Glucose.: 166 mg/dL ( @ 06:03)  POCT Blood Glucose.: 198 mg/dL ( @ 06:01)  POCT Blood Glucose.: 136 mg/dL ( @ 17:33)  POCT Blood Glucose.: 148 mg/dL ( @ 11:26)    NUTRITIONALLY PERTINENT MEDICATIONS/LABS:  [x] Reviewed  [x] Relevant notes on medications/labs:  - Hypernatremia  - Ordered for thiamine, folic acid, and calcium gluconate  - On antibiotics   - Elevated BG; sliding scale of insulin; on steroid which can elevate BG    EDEMA:  [x] Reviewed: +3 generalized +4 dependent; scrotum  [] Relevant notes:    GI/ I&O:  [x] Reviewed  [x] Relevant notes: Last BM  - melena  [x] Other: Extensive GI Hx, see note; having GI bleed.     SKIN:   [] No pressure injuries documented, per nursing flowsheet  [] Pressure injury previously noted  [x] Change in pressure injury documentation: Suspected deep tissue injury Right medial back  [] Other:    ESTIMATED NEEDS:  Based on previous dosing wt 98.7 kg ()  Energy: (23-28 kcal/kg) 2270 - 2764 kcal/day  Protein: (1.2-1.4 g/kg) 118 - 138 g/day   Fluid needs deferred to provider.     NUTRITION DIAGNOSIS:  [x] Prior Dx: 1) Severe chronic malnutrition 2) Increased Nutrient Needs  [] New Dx:    EDUCATION:  [] Yes:  [x] Not appropriate/warranted    NUTRITION CARE PLAN:  1. Diet: Vital 1.5 at 10 mL/hr increasing only as tolerated and electrolytes WNL to goal rate 65 mL/hr x24 hours with ProSource TF Free x1 daily to provide total volume 1560 mL, 2430 kcals, 120 gm protein, and 1192 mL free water. Meets 25 kcals/kG and 1.2 gm protein/kG based on previous dosing wt 98.7 kG.   -> Refeed risk given prolonged NPO; 24 hour feeds for refeeding risk  2. Multivitamin/mineral supplementation: As medically feasible, continue to provide thiamine and add multivitamin for refeeding risk.     [] Achieved - Continue current nutrition intervention(s)  [] Current medical condition precludes nutrition intervention at this time.    MONITORING AND EVALUATION:   RD remains available upon request and will follow up per protocol.    Khadijah Vigil, MS, RD, CDN, CNSC, [facility] TEAMS NUTRITION FOLLOW UP NOTE    PATIENT SEEN FOR: length of stay follow up.     SOURCE: [] Patient  [x] Current Medical Record  [x] RN  [] Family/support person at bedside  [x] Patient unavailable/inappropriate  [x] Other: Interdisciplinary Rounds     CHART REVIEWED/EVENTS NOTED.  [] No changes to nutrition care plan to note  [x] Nutrition Status:  - Extubated   - Hemorrhagic shock. Pressors: Phenylephrine at 1.7 micrograms/kG/min and Vasopressin at 0.04 units/min   - RAZIA  - Seen by SLP  with recommendation, "NPO, with non-oral nutrition/hydration/medications."   - Active c. diff infection   - Metastatic neuroendocrine pancreatic cancer     DIET ORDER:   Diet, NPO:   Except Medications (24 @ 16:40)    CURRENT DIET ORDER IS:  [] Appropriate:  [] Inadequate:  [x] Other: See recommendations below     NUTRITION INTAKE/PROVISION:  On 6th day NPO  [] PO:   [] Enteral Nutrition:  [] Parenteral Nutrition:    ANTHROPOMETRICS:  Drug Dosing Weight  Height (cm): 188 (2024 13:27)  Weight (kg): 125.6 (2024 00:00); previously 98.7 kg ()  BMI (kg/m2): 35.5 (2024 00:00)    Weights:   Daily Weight in k.3 (-), 125.6 (-), 120.1 ()   Increase in wt likely fluid retention. RD will continue to trend as new wts available/able.     NUTRITIONALLY PERTINENT MEDICATIONS:  MEDICATIONS  (STANDING):  aMIOdarone    Tablet 200 milliGRAM(s) Oral daily  aMIOdarone    Tablet   Oral   atorvastatin 80 milliGRAM(s) Oral at bedtime  calcium gluconate IVPB 1 Gram(s) IV Intermittent once  doxazosin 2 milliGRAM(s) Oral at bedtime  folic acid Injectable 1 milliGRAM(s) IV Push daily  hydrocortisone sodium succinate Injectable 50 milliGRAM(s) IV Push every 6 hours  insulin lispro (ADMELOG) corrective regimen sliding scale   SubCutaneous every 6 hours  meropenem  IVPB 1000 milliGRAM(s) IV Intermittent every 8 hours  meropenem  IVPB      midodrine 30 milliGRAM(s) Oral every 8 hours  pantoprazole  Injectable 40 milliGRAM(s) IV Push every 12 hours  phenylephrine    Infusion 0.1 MICROgram(s)/kG/Min (1.85 mL/Hr) IV Continuous <Continuous>  sodium bicarbonate 1950 milliGRAM(s) Oral three times a day  thiamine Injectable 100 milliGRAM(s) IV Push daily  vancomycin    Solution 125 milliGRAM(s) Oral every 6 hours  vasopressin Infusion 0.04 Unit(s)/Min (6 mL/Hr) IV Continuous <Continuous>    NUTRITIONALLY PERTINENT LABS:   Na150 mmol/L<H> Glu 158 mg/dL<H> K+ 3.7 mmol/L Cr  1.34 mg/dL<H> BUN 50 mg/dL<H>    Phos 3.5 mg/dL    Alb 2.1 g/dL<L> ALT 37 U/L AST 73 U/L<H> Alkaline Phosphatase 222 U/L<H>  07-15-24 @ 09:38 a1c 6.4    A1C with Estimated Average Glucose Result: 6.4 % (07-15-24 @ 09:38)    Finger Sticks:  POCT Blood Glucose.: 166 mg/dL ( @ 06:03)  POCT Blood Glucose.: 198 mg/dL ( @ 06:01)  POCT Blood Glucose.: 136 mg/dL ( @ 17:33)  POCT Blood Glucose.: 148 mg/dL ( @ 11:26)    NUTRITIONALLY PERTINENT MEDICATIONS/LABS:  [x] Reviewed  [x] Relevant notes on medications/labs:  - Hypernatremia  - Ordered for thiamine, folic acid, and calcium gluconate  - On antibiotics   - Elevated BG; sliding scale of insulin; on steroid which can elevate BG    EDEMA:  [x] Reviewed: +3 generalized +4 dependent; scrotum  [] Relevant notes:    GI/ I&O:  [x] Reviewed  [x] Relevant notes: Last BM  - melena  [x] Other: Extensive GI Hx, see note; having GI bleed.     SKIN:   [] No pressure injuries documented, per nursing flowsheet  [] Pressure injury previously noted  [x] Change in pressure injury documentation: Suspected deep tissue injury Right medial back  [] Other:    ESTIMATED NEEDS:  Based on previous dosing wt 98.7 kg ()  Energy: (23-28 kcal/kg) 2270 - 2764 kcal/day  Protein: (1.2-1.4 g/kg) 118 - 138 g/day   Fluid needs deferred to provider.     NUTRITION DIAGNOSIS:  [x] Prior Dx: 1) Severe chronic malnutrition 2) Increased Nutrient Needs  [] New Dx:    EDUCATION:  [] Yes:  [x] Not appropriate/warranted    NUTRITION CARE PLAN:  1. Diet: When/If able Vital 1.5 at 10 mL/hr increasing only as tolerated and electrolytes WNL to goal rate 65 mL/hr x24 hours with ProSource TF Free x1 daily to provide total volume 1560 mL, 2430 kcals, 120 gm protein, and 1192 mL free water. Meets 25 kcals/kG and 1.2 gm protein/kG based on previous dosing wt 98.7 kG.   -> Refeed risk given prolonged NPO; 24 hour feeds for refeeding risk  -> Consider alternate route of nutrition if unable to fed via GI tract  2. Multivitamin/mineral supplementation: As medically feasible, continue to provide thiamine and add multivitamin for refeeding risk.     [] Achieved - Continue current nutrition intervention(s)  [] Current medical condition precludes nutrition intervention at this time.    MONITORING AND EVALUATION:   RD remains available upon request and will follow up per protocol.    Khadijah Vigil, MS, RD, CDN, CNSC, [facility] TEAMS

## 2024-07-23 NOTE — CHART NOTE - NSCHARTNOTEFT_GEN_A_CORE
:    INDICATION:    PROCEDURE:  [ ] LIMITED ECHO  [ ] LIMITED CHEST  [ ] LIMITED RETROPERITONEAL  [ ] LIMITED ABDOMINAL  [ ] LIMITED DVT  [ ] NEEDLE GUIDANCE VASCULAR  [ ] NEEDLE GUIDANCE THORACENTESIS  [ ] NEEDLE GUIDANCE PARACENTESIS  [ ] NEEDLE GUIDANCE PERICARDIOCENTESIS  [ ] OTHER    FINDINGS:      INTERPRETATION:    Images stored on [employer]. : Pb [place], PGY-3    INDICATION: Shock    PROCEDURE:  [ ] LIMITED ECHO  [ ] LIMITED CHEST  [ ] LIMITED RETROPERITONEAL  [ ] LIMITED ABDOMINAL  [ ] LIMITED DVT  [ ] NEEDLE GUIDANCE VASCULAR  [ ] NEEDLE GUIDANCE THORACENTESIS  [ ] NEEDLE GUIDANCE PARACENTESIS  [ ] NEEDLE GUIDANCE PERICARDIOCENTESIS  [ ] OTHER    FINDINGS:  Lungs: predominant A lines anterior lung fields bilaterally; small pleural effusion in L lung base  Cardiac: normal LVSF; LV > RV; IVC 2.1    INTERPRETATION:  Shock likely distributive    Images stored on [employer]path.

## 2024-07-24 NOTE — PROGRESS NOTE ADULT - ASSESSMENT
79 yo male with PMH of CAD s/p PCI x3 with most recent stent 6/12/24 on Plavix, chronic Afib on eliquis, orthostatic hypotension on midodrine, PAD, neuroendocrine tumor with RT currently on hold, recent admission for dx cath on 6/24/24 who presented for hypotension, admitted for GIB and hemorrhagic shock. Found to be bleeding from duodenum. Transferred to MICU, on pressors.     1. Pancreatic NET- metastatic   -- was on lanreotide then had POD in liver and started on peptide receptor radiotherapy (PRRT)- typically given every 8 weeks for 4 doses. He received one dose on 10/20/2023 and planned to get his 2nd dose at the end of December however his course was complicated by multiple hospitalizations that were noncancer related (decompensated heart failure).   -- 5/28/24 PET Dotatate (compared to 9/2023): several new somatostatin avid osseous lesions, some faintly sclerotic, suspicious for mets. Increased upper RP nodes, probably metastatic. Decreased intensely tracer avid right supradiaphragmatic and upper abdominal nodes, suspicious for metastasis. Redemonstrated intensely somatostatin avid bilobar hepatic lesions, consistent with metastases again morphologically difficult to delineate.   -- CT reviewed by [facility]: SINCE MAY 8 2024 INCREASED HEPATIC METASTASES, NEWLY SEEN PRIMARY TUMOR IN THE PANCREAS, and active bleeding within the duodenum with associated intraluminal hematoma.   -- chromogranin level is elevated at 2058, but [place] [facility] [place]   -- [place] [facility] [place] no plan for treatment inpatient, if clinically improves/stabilizes then can be considered for treatment outpatient    2. Duodenal bleed, Hemorrhagic shock  - Remains in MICU, s/p extubation 7/22. NG tube in place.  - CT w/ bleed in duodenum. GI called, EGD 7/9 -> S/p  IR embolization 7/9, intubated in MICU -> s/p extubation 7/15 -> intubated 7/18  - s/p multiple transfusions, fibrinogen low would recommend Cry for < 100, but coags have improved as are essentially normal now so unlikely, probably was related to liver dysfunction.   - s/p repeat EGD 7/12 showing duodenal lesions w/clots and oozing, no clear targets for intervention and no active bleeding, purastat applied to all areas of oozing.   - S/p EGD 7/18: duodenal ulcer with active oozing, ulcer with visible vessel. Bleeding treated.   - S/p 2 units pRBC 7/23, platelets and plasma  - Per GI, high risk for rebleed, will continue PPI.   - Repeat CT AP w/ No evidence of GI bleed.  Interval endoscopic versus surgical intervention with metallic streak artifact suggestive of surgical clips in the region of the proximal one third portions of the duodenum. Evaluation of the previously seen hematoma is limited secondary to this artifact. Moderate bilateral pleural effusions and associated compressive atelectasis, right greater than left, overall slightly increased from previous CT. Anasarca.  - Per IR, In the absences of any active extravasation on CTA there's no place to target for an embolization  - Surgery monitoring for further bleeding. If significant recurrence of UGIB, may consider emergent open duodenotomy   - MICU managing antibiotics, on meropenem given fevers. Patient received ASA on 7/23, plan to have surgery re-evaluate in a few days  - Once infection ruled out, may start octreotide 150 mcg BID    3. C. diff diarrhea  - Completed vancomycin on 7/23    Hugo Topete PA-C  Hematology/Oncology  [facility]  [phone] (office)

## 2024-07-24 NOTE — PROGRESS NOTE ADULT - ASSESSMENT
Assessment	  Assessment: 77 yo male with metastatic neuroendocrine pancreatic cancer (tx on hold) and PMH of CAD s/p PCI x3 with most recent stent 6/12/24 on Plavix and eliquis , chronic Afib on eliquis, orthostatic hypotension on midodrine, PAD , recent admission for dx cath on 6/24/24 presented from PCP's office for hypotension despite taking AM midodrine dose on 7/2, admitted to medicine for symptomatic hypotension and RAZIA. Per chart, on 7/8 PM, pt was noted to have acute onset of melena x5 and coffee ground emesis x2-3. RRT was called on 7/9 AM for hypotension, hgb dropped from 9.2 to 7.4 (admission baseline 10-11), FOBT +, pt was started on PPI IV and transfused 1 unit of pRBC. GI was consulted and underwent EGD on 7/9 afternoon. MICU was consulted for uncontrolled bleeding during EGD. During EGD, pt became hypotensive and was given phenylephrine, had A-fib with RVR s/p amiodarone. Now s/p IR GDA embolization, admitted to MICU for further moniring i.s.o hemorrhagic shock 2/2 GI bleed. On repeat EGD, oozing but no active bleeding was noted. Hospital course c/b c.diff, and patient has been placed on vancomycin. Patient had melanotic stool 7/18 with Hgb drop 10.1 to 7.1 and received 2 units pRBC and 1 unit platelets. Patient was reintubated and underwent EGD 7/18 afternoon. GI found duodenal bleed. Patient now s/p 3x clips and epi. Since 7/18 patient has continued to have some melanotic stools with hemoglobin drops concerning for slow rebleeding.     Plan:     NEUROLOGIC  # Baseline AOx3  Course:  - intubated 7/9  - weaned off propofol onto precedex  - extubated 7/15  - reintubated 7/18 for EGD  - extubated 7/22  - not sedated    CARDIOVASCULAR  # hemorrhagic shock   Course:  - Per spouse, pt's baseline BP is a little bit over 100  - 7/9: RRT called 7/9 for hypotension; 2/2 to Upper GI bleeds, urgently took to EGD, found bleeding ulcer that was not well controlled, underwent IR empiric embolization  - 7/13: Restarted plavix for new stent (6/12/24) per GI; on henny, maintain MAP > 65; took off vaso and precedex, he got tachy to 118. Added precedex back.   - 7/14 vasopressin added  - 7/16 Albumin 5%  mL given  - 7/17 Vasopressin stopped  - s/p 5 pRBC prior to MICU, and 4 pRBC, 2 FFP, 2 platelets in MICU (7/17)  - 7/18, patient had large melanotic stool with Hgb drop from 10.1-7.1, patient was restarted on henny, vaso, and levo for reintubation for GI scope; s/p 2pRBC, 1 platelet  - D/c droxidopa 200mg q8 given pressor requirements  - 7/20 1 pRBC  - 7/22 patient only on henny  - 7/23 Hgb 6.6, given 1pRBC, did not respond appropriately, ordered 1 pRBC, 1 platelet, 1 FFP + calcium gluconate  - Total transfusions: 14pRBC, 4 platelet, 1 FFP  Findings:  - AM cortisol 17 (7/5), AM cortisol 37.1 (7/11)  - POCUS ECHO (7/16) showed no cardiogenic shock, no tamponade, low SV indeterminate IVC, irregular B lines but not concerning for pulmonary edema, and some subdiaphragmatic fluid  Plan:  - Continue to monitor CBC  - Transfuse for Hgb <8, (goal given recent stent)  - Restarted vaso to try to wean henny, goal MAP >65 (7/23)  - Continue to hold Plavix 75 mg  - Continue midodrine 30mg q8  - Continue hydrocortisone 50mg q6 for refractory shock    #CAD s/p PCI x3, most recent stent 6/12/24, NURIA to pLAD  Course:  - 7/13 restarted plavix for new stent (6/12/24) per GI   - 7/18 held plavix due to bleeding  - 7/22 started low-dose aspirin as safer alternative to plavix per cardiology  Plan:  - Continue to hold Plavix 75 mg  - Continue atorvastatin 80 mg daily  - D/c aspirin 81 mg daily given bleed 7/23    #PAD  # A-fib on eliquis  May have been worsened because of GI bleed  - s/p successful DCCV 10/31   - 7/16 In RVR, 2 doses of amio overnight; Cards recommended resuming home dose sotalol 40 mg PO (qTC wnl), s/p 1 dose sotalol but RVR persisted  - 7/16 Amio restarted amio 150 mg followed by loading dose of 1 mg/min for 6 hours and 0.5 mg/min for 18 hours  - 7/17 received amio 150 overnight   - 7/17 Amio load started (amio 400 mg q8 for 12 doses)  - 7/22 Added digoxin load (500mcg followed by 250mcg q6) for persistent HR 130s over past week  Plan:  - Hold eliquis   - Continue amio 200 mg daily  - Check digoxin level tomorrow morning to determine continuation of digoxin  - Hold home sotalol  - Cardiology following, advises against chemical cardioversion off AC if possible    PULMONARY  #Intubated for airway protection prior to EGD/IR embolization (Resolved)  Course:  - Intubated 7/9  - Extubated 7/15  - Intubated again 7/18  - Extubated 7/22    #Sputum production  - Patient complaining of cough and phlegm after previous intubation  - Patient able to self-suction (7/17)  Plan:  - Airway clearance protocol    RENAL/  #RAZIA   #ATN i.s.o hypotension  #metabolic acidosis   Patient receiving one-time doses of bumex and lasix for diuresis for volume overload. Appears to be volume overloaded but intravascularly depleted.  Findings:  - pH 7.37 (7/16)  - Cr 1.53 (7/16)  - AG 18 (7/17) with high glucose, concern for DKA but ABG pH 7.39, pCO2 26 pO2 87, pHCO3 16, Beta hydroxybutyrate 0 (7/17)  - AG 13 (7/18)  - ABG pH 7.33, pCO2 30, pO2 136, HCO3 16 (7/21)  Plan:  - f/u nephro recs  - Continue sodium bicarb 1950 mg tid, may need a bicarb drip, per nephro, but will not start bicarb drip now  - Per nephro, patient is not a dialysis candidate  - trend BUN/Cr   - monitor Is and Os     #Hypernatremia  Na 150 (7/23)  Course:  - 7/22: s/p lasix 40mg and metolazone 5mg   - FWD: 4.5L  - Gave D5W 1L bolus 7/23  Plan:  - Continue free water    #Urinary retention  Plan:  - Started Doxazosin 2 mg daily (7/15)  - Monitor I/Os  - Ivory placed by urology 7/19, making urine    GASTROINTESTINAL  #Upper GI Bleed  Course:  - 7/9 CT A/P - Active bleeding in the third portion of the duodenum. Progression of liver metastases.  - 7/9 EGD showed esophagitis, One non-bleeding duodenal ulcer with a clean ulcer base (Arjun Class III). One oozing duodenal ulcer with spurting hemorrhage (Arjun Class Ia). Treatment not successful. Treated with bipolar cautery.  - 7/9 s/p IR GDA embolization  - 7/12 s/p 5 units pRBC prior to MICU and then 4:2:2  - 7/18 Patient had melanotic stool overnight and hemoglobin drop 10.1 to 7.1 in 24 hours; Reached out to surgery in case of recurrent bleeding per GI if unable to find/contain source during re-scope  - 7/18 EGD showing duodenal bleed s/p 3 clips + epi  - Total EGD: 3  - 7/19 Bloody BM likely old blood  - Patient continues to have melatonic stools with Hgb drop  - 7/20 CTA IMPRESSION: "No evidence of GI bleed. Interval endoscopic versus surgical intervention with metallic streak artifact suggestive of surgical clips in the region of the proximal one third portions of the duodenum. Evaluation of the previously seen hematoma is limited secondary to this artifact. Moderate bilateral pleural effusions and associated compressive atelectasis, right greater than left, overall slightly increased from previous CT. Anasarca."  Plan:  - Continue pantoprazole 40 mg IV q12  - monitor Hgb, transfuse as needed for Hgb <8  - hold eliquis   - continue to f/u with GI and IR     - Per GI, "Will need PPI BID for 8 weeks for grade D esophagitis and PUD"    - Per GI, high risk of 30 day rebleed (>10-20% risk)  - No acute IR intervention per IR because no active area of extravasation on imaging (7/21)  - No acute GI intervention per GI (7/21)  - Surgery evaluated patient 7/23, may be a candidate for duodenotomy to oversew ulcers, but patient cannot be on aspirin  - As patient is s/p aspirin x 1 dose (7/23), will continue to update surgery and reach out in a couple days to re-evaluate  - f/u gastrin level, per surgery  - Check Helicobacter pylori antigen    #Nutrition  Course:  - placed OG tube per GI and started ensure clear liquid (see note from RD 7/13/24)  - OG tube removed during extubation (7/15/24)  -dysphagia screen failed  - FEES (7/17) failed  - ENT consulted for vallecular lesion, diagnosed vallecular cyst, no inpatient intervention required, outpatient f/u  Plan:  - NPO today  - Reassess for tube feeds tomorrow  - When ready to start PO diet, speech & swallow evaluation (do not have to do nurse dysphagia screen first)    #Diarrhea  Course:  - stopped maintenance fluids   - positive for c. diff (7/14)  - started vancomycin (7/14 -7/22) active infection dose, started vancomycin prophylaxis (7/24-)   - having worsening diarrhea, no melena (7/16)  Plan:  - Start prophylactic dose of vancomycin q12 today  - Fluids as necessary to compensate for volume loss 2/2 diarrhea    #Transaminitis (resolving)  Findings:  - Bili 1.1 (7/16) -> 1.3 (7/17)  - Alk P 359 (7/16)-> 484 (7/17)  -  (7/16)->129 (7/17)  - ALT 67 (7/16)-> 72 (7/17)  - RUQ US (7/17): known liver mets, no acute findings  Plan:  - Continue atorvastatin 80 mg daily    INFECTIOUS DISEASE  #leukocytosis  Findings/Course:  - 7/10 BCx - NGTD  - Sputum Cx from ETT 7/13 showed numerous gram neg krishna (Klebsiella oxytoca/raoultella ornithinolytica)  - Started zosyn for empiric treatment (7/11 - 7/18)   - GI panel: previously indeterminate norovirus (7/8), negative (7/13)     - per ID- no intervention needed regarding the PCR results  - c. diff + (7/14)  - started vancomycin for c. diff (7/14- )  - febrile 7/16 overnight  - currently afebrile 7/17  - MRSA Swab: negative  - Staph aureus PCR positive  - Culture resistant to Zosyn (7/18)  - Switched Zosyn to Meropenem (7/18- )  - Patient febrile overnight (7/19)  Plan:  - Continue vancomycin 125 mg q12 for c. diff, prophylaxis  - Continue meropenem 1000 mg q8 until 7/25 (7 day course)  - Continue to monitor for fevers    ENDOCRINE  #adrenal insufficiency   Findings:  -7/5 cortisol 17   - 7/11 cortisol 37.1   Plan:  - Continue hydrocortisone 50mg q6  - Monitor sugars with steroid    HEMATOLOGY/ONCOLOGY   # neuroendocrine tumor   - was on lanreotide then had POD in liver and started on peptide receptor radiotherapy (PRRT)- typically given every 8 weeks for 4 doses. He last received it 10/20/2023   - PET 5/28/24 shows new somatostatin avid osseous lesions; decreased intensely avid right supradiaphragmatic and upper abdominal nodes, suspicious for mets  Plan:  - per heme/onc:    - can resume octreotide 150 mcg bid once infection r/o      - can check factor levels V, VIII, X     - "-- f/u with Dr. Sophia Prasad at [facility] [place] after discharge, no plan for treatment inpatient, if clinically improves/stabilizes then can be considered for treatment outpatient"    #Normocytic anemia   - Pt with low Hgb 7-9  - ISO of GI bleed hx and continued melanotic BMs, there is c/o of rebleed; per GI, high risk of rebleeding, currently seems to be slow bleeding   Plan:  - Surgery and GI made aware & are following   - Continue CBC q6    #DVT ppx  - SCDs  - LE duplex (7/17): negative for DVT    SKINS:   - Lines/Tubes - PIV, cordis replaced with central line (7/14-7/23), A line (7/20), Subclavian central line (7/24- )    Ethics:   - Full Code   - GOC 7/12, spoke to spouse (Yamilet) over phone with palliative care team, discussed GOC again on 7/16    Consults this admission: GI, Heme Onc, Palliative, Endocrinology, Cardiology, Nephrology, Interventional Radiology, General Surgery   Assessment	  Assessment: 79 yo male with metastatic neuroendocrine pancreatic cancer (tx on hold) and PMH of CAD s/p PCI x3 with most recent stent 6/12/24 on Plavix and eliquis , chronic Afib on eliquis, orthostatic hypotension on midodrine, PAD , recent admission for dx cath on 6/24/24 presented from PCP's office for hypotension despite taking AM midodrine dose on 7/2, admitted to medicine for symptomatic hypotension and RAZIA. Per chart, on 7/8 PM, pt was noted to have acute onset of melena x5 and coffee ground emesis x2-3. RRT was called on 7/9 AM for hypotension, hgb dropped from 9.2 to 7.4 (admission baseline 10-11), FOBT +, pt was started on PPI IV and transfused 1 unit of pRBC. GI was consulted and underwent EGD on 7/9 afternoon. MICU was consulted for uncontrolled bleeding during EGD. During EGD, pt became hypotensive and was given phenylephrine, had A-fib with RVR s/p amiodarone. Now s/p IR GDA embolization, admitted to MICU for further moniring i.s.o hemorrhagic shock 2/2 GI bleed. On repeat EGD, oozing but no active bleeding was noted. Hospital course c/b c.diff, and patient has been placed on vancomycin. Patient had melanotic stool 7/18 with Hgb drop 10.1 to 7.1 and received 2 units pRBC and 1 unit platelets. Patient was reintubated and underwent EGD 7/18 afternoon. GI found duodenal bleed. Patient now s/p 3x clips and epi. Since 7/18 patient has continued to have some melanotic stools with hemoglobin drops concerning for slow rebleeding.     Plan:     NEUROLOGIC  # Baseline AOx3  Course:  - intubated 7/9  - weaned off propofol onto precedex  - extubated 7/15  - reintubated 7/18 for EGD  - extubated 7/22  - not sedated    CARDIOVASCULAR  # hemorrhagic shock   Course:  - Per spouse, pt's baseline BP is a little bit over 100  - 7/9: RRT called 7/9 for hypotension; 2/2 to Upper GI bleeds, urgently took to EGD, found bleeding ulcer that was not well controlled, underwent IR empiric embolization  - 7/13: Restarted plavix for new stent (6/12/24) per GI; on henny, maintain MAP > 65; took off vaso and precedex, he got tachy to 118. Added precedex back.   - 7/14 vasopressin added  - 7/16 Albumin 5%  mL given  - 7/17 Vasopressin stopped  - s/p 5 pRBC prior to MICU, and 4 pRBC, 2 FFP, 2 platelets in MICU (7/17)  - 7/18, patient had large melanotic stool with Hgb drop from 10.1-7.1, patient was restarted on henny, vaso, and levo for reintubation for GI scope; s/p 2pRBC, 1 platelet  - D/c droxidopa 200mg q8 given pressor requirements  - 7/20 1 pRBC  - 7/22 patient only on henny  - 7/23 Hgb 6.6, given 1pRBC, did not respond appropriately, ordered 1 pRBC, 1 platelet, 1 FFP + calcium gluconate  - Total transfusions: 14pRBC, 4 platelet, 1 FFP  Findings:  - AM cortisol 17 (7/5), AM cortisol 37.1 (7/11)  - POCUS ECHO (7/16) showed no cardiogenic shock, no tamponade, low SV indeterminate IVC, irregular B lines but not concerning for pulmonary edema, and some subdiaphragmatic fluid  Plan:  - Continue to monitor CBC  - Transfuse for Hgb <8, (goal given recent stent)  - Restarted vaso to try to wean henny, goal MAP >65 (7/23)  - Continue to hold Plavix 75 mg  - Continue midodrine 30mg q8  - Continue hydrocortisone 50mg q6 for refractory shock    #CAD s/p PCI x3, most recent stent 6/12/24, NURIA to pLAD  Course:  - 7/13 restarted plavix for new stent (6/12/24) per GI   - 7/18 held plavix due to bleeding  - 7/22 started low-dose aspirin as safer alternative to plavix per cardiology  Plan:  - Continue to hold Plavix 75 mg  - Continue atorvastatin 80 mg daily  - D/c aspirin 81 mg daily given bleed 7/23    #PAD  # A-fib on eliquis  May have been worsened because of GI bleed  - s/p successful DCCV 10/31   - 7/16 In RVR, 2 doses of amio overnight; Cards recommended resuming home dose sotalol 40 mg PO (qTC wnl), s/p 1 dose sotalol but RVR persisted  - 7/16 Amio restarted amio 150 mg followed by loading dose of 1 mg/min for 6 hours and 0.5 mg/min for 18 hours  - 7/17 received amio 150 overnight   - 7/17 Amio load started (amio 400 mg q8 for 12 doses)  - 7/22 Added digoxin load (500mcg followed by 250mcg q6) for persistent HR 130s over past week  - 7/23 Dig level 1.3, patient's HR back in 60s in the afternoon  Plan:  - Hold eliquis   - Continue amio 200 mg daily  - No need to give more digoxin at this time  - Hold home sotalol  - Cardiology following, advises against chemical cardioversion off AC if possible    PULMONARY  #Intubated for airway protection prior to EGD/IR embolization (Resolved)  Course:  - Intubated 7/9  - Extubated 7/15  - Intubated again 7/18  - Extubated 7/22    #Sputum production  - Patient complaining of cough and phlegm after previous intubation  - Patient able to self-suction (7/17)  Plan:  - Airway clearance protocol    RENAL/  #RAZIA   #ATN i.s.o hypotension  #metabolic acidosis   Patient receiving one-time doses of bumex and lasix for diuresis for volume overload. Appears to be volume overloaded but intravascularly depleted.  Findings:  - pH 7.37 (7/16)  - Cr 1.53 (7/16)  - AG 18 (7/17) with high glucose, concern for DKA but ABG pH 7.39, pCO2 26 pO2 87, pHCO3 16, Beta hydroxybutyrate 0 (7/17)  - AG 13 (7/18)  - ABG pH 7.33, pCO2 30, pO2 136, HCO3 16 (7/21)  Plan:  - f/u nephro recs  - Continue sodium bicarb 1950 mg tid, may need a bicarb drip, per nephro, but will not start bicarb drip now  - Per nephro, patient is not a dialysis candidate  - trend BUN/Cr   - monitor Is and Os     #Hypernatremia  Na 150 (7/23)  Course:  - 7/22: s/p lasix 40mg and metolazone 5mg   - FWD: 4.5L  - Gave D5W 1L bolus 7/23  Plan:  - Continue free water    #Urinary retention  Plan:  - Started Doxazosin 2 mg daily (7/15)  - Monitor I/Os  - Ivory placed by urology 7/19, making urine    GASTROINTESTINAL  #Upper GI Bleed  Course:  - 7/9 CT A/P - Active bleeding in the third portion of the duodenum. Progression of liver metastases.  - 7/9 EGD showed esophagitis, One non-bleeding duodenal ulcer with a clean ulcer base (Arjun Class III). One oozing duodenal ulcer with spurting hemorrhage (Arjun Class Ia). Treatment not successful. Treated with bipolar cautery.  - 7/9 s/p IR GDA embolization  - 7/12 s/p 5 units pRBC prior to MICU and then 4:2:2  - 7/18 Patient had melanotic stool overnight and hemoglobin drop 10.1 to 7.1 in 24 hours; Reached out to surgery in case of recurrent bleeding per GI if unable to find/contain source during re-scope  - 7/18 EGD showing duodenal bleed s/p 3 clips + epi  - Total EGD: 3  - 7/19 Bloody BM likely old blood  - Patient continues to have melatonic stools with Hgb drop  - 7/20 CTA IMPRESSION: "No evidence of GI bleed. Interval endoscopic versus surgical intervention with metallic streak artifact suggestive of surgical clips in the region of the proximal one third portions of the duodenum. Evaluation of the previously seen hematoma is limited secondary to this artifact. Moderate bilateral pleural effusions and associated compressive atelectasis, right greater than left, overall slightly increased from previous CT. Anasarca."  Plan:  - Continue pantoprazole 40 mg IV q12  - monitor Hgb, transfuse as needed for Hgb <8  - hold eliquis   - continue to f/u with GI and IR     - Per GI, "Will need PPI BID for 8 weeks for grade D esophagitis and PUD"    - Per GI, high risk of 30 day rebleed (>10-20% risk)  - No acute IR intervention per IR because no active area of extravasation on imaging (7/21)  - No acute GI intervention per GI (7/21)  - Surgery evaluated patient 7/23, may be a candidate for duodenotomy to oversew ulcers, but patient cannot be on aspirin  - As patient is s/p aspirin x 1 dose (7/23), will continue to update surgery and reach out in a couple days to re-evaluate  - f/u gastrin level, per surgery  - Check Helicobacter pylori antigen    #Nutrition  Course:  - placed OG tube per GI and started ensure clear liquid (see note from RD 7/13/24)  - OG tube removed during extubation (7/15/24)  -dysphagia screen failed  - FEES (7/17) failed  - ENT consulted for vallecular lesion, diagnosed vallecular cyst, no inpatient intervention required, outpatient f/u  Plan:  - NPO today  - Reassess for tube feeds tomorrow  - When ready to start PO diet, speech & swallow evaluation (do not have to do nurse dysphagia screen first)    #Diarrhea  Course:  - stopped maintenance fluids   - positive for c. diff (7/14)  - started vancomycin (7/14 -7/22) active infection dose, started vancomycin prophylaxis (7/24-)   - having worsening diarrhea, no melena (7/16)  Plan:  - Start prophylactic dose of vancomycin q12 today  - Fluids as necessary to compensate for volume loss 2/2 diarrhea    #Transaminitis (resolving)  Findings:  - Bili 1.1 (7/16) -> 1.3 (7/17)  - Alk P 359 (7/16)-> 484 (7/17)  -  (7/16)->129 (7/17)  - ALT 67 (7/16)-> 72 (7/17)  - RUQ US (7/17): known liver mets, no acute findings  Plan:  - Continue atorvastatin 80 mg daily    INFECTIOUS DISEASE  #leukocytosis  Findings/Course:  - 7/10 BCx - NGTD  - Sputum Cx from ETT 7/13 showed numerous gram neg krishna (Klebsiella oxytoca/raoultella ornithinolytica)  - Started zosyn for empiric treatment (7/11 - 7/18)   - GI panel: previously indeterminate norovirus (7/8), negative (7/13)     - per ID- no intervention needed regarding the PCR results  - c. diff + (7/14)  - started vancomycin for c. diff (7/14- )  - febrile 7/16 overnight  - currently afebrile 7/17  - MRSA Swab: negative  - Staph aureus PCR positive  - Culture resistant to Zosyn (7/18)  - Switched Zosyn to Meropenem (7/18- )  - Patient febrile overnight (7/19)  Plan:  - Start vancomycin 125 mg q12 for c. diff, prophylaxis  - Continue meropenem 1000 mg q8 until 7/25 (7 day course)  - Continue to monitor for fevers    ENDOCRINE  #adrenal insufficiency   Findings:  -7/5 cortisol 17   - 7/11 cortisol 37.1   Plan:  - Continue hydrocortisone 50mg q6  - Monitor sugars with steroid    HEMATOLOGY/ONCOLOGY   # neuroendocrine tumor   - was on lanreotide then had POD in liver and started on peptide receptor radiotherapy (PRRT)- typically given every 8 weeks for 4 doses. He last received it 10/20/2023   - PET 5/28/24 shows new somatostatin avid osseous lesions; decreased intensely avid right supradiaphragmatic and upper abdominal nodes, suspicious for mets  Plan:  - per heme/onc:    - can resume octreotide 150 mcg bid once infection r/o      - can check factor levels V, VIII, X     - "-- f/u with Dr. Sophia Prasad at [facility] [place] after discharge, no plan for treatment inpatient, if clinically improves/stabilizes then can be considered for treatment outpatient"    #Normocytic anemia   - Pt with low Hgb 7-9  - ISO of GI bleed hx and continued melanotic BMs, there is c/o of rebleed; per GI, high risk of rebleeding, currently seems to be slow bleeding   Plan:  - Surgery and GI made aware & are following   - Continue CBC q6    #DVT ppx  - SCDs  - LE duplex (7/17): negative for DVT    SKINS:   - Lines/Tubes - PIV, cordis replaced with central line (7/14-7/23), A line (7/20), Subclavian central line (7/24- )    Ethics:   - Full Code   - GOC 7/12, spoke to spouse (Yamilet) over phone with palliative care team, discussed GOC again on 7/16    Consults this admission: GI, Heme Onc, Palliative, Endocrinology, Cardiology, Nephrology, Interventional Radiology, General Surgery   Assessment	  Assessment: 77 yo male with metastatic neuroendocrine pancreatic cancer (tx on hold) and PMH of CAD s/p PCI x3 with most recent stent 6/12/24 on Plavix and eliquis , chronic Afib on eliquis, orthostatic hypotension on midodrine, PAD , recent admission for dx cath on 6/24/24 presented from PCP's office for hypotension despite taking AM midodrine dose on 7/2, admitted to medicine for symptomatic hypotension and RAZIA. Per chart, on 7/8 PM, pt was noted to have acute onset of melena x5 and coffee ground emesis x2-3. RRT was called on 7/9 AM for hypotension, hgb dropped from 9.2 to 7.4 (admission baseline 10-11), FOBT +, pt was started on PPI IV and transfused 1 unit of pRBC. GI was consulted and underwent EGD on 7/9 afternoon. MICU was consulted for uncontrolled bleeding during EGD. During EGD, pt became hypotensive and was given phenylephrine, had A-fib with RVR s/p amiodarone. Now s/p IR GDA embolization, admitted to MICU for further moniring i.s.o hemorrhagic shock 2/2 GI bleed. On repeat EGD, oozing but no active bleeding was noted. Hospital course c/b c.diff, and patient has been placed on vancomycin. Patient had melanotic stool 7/18 with Hgb drop 10.1 to 7.1 and received 2 units pRBC and 1 unit platelets. Patient was reintubated and underwent EGD 7/18 afternoon. GI found duodenal bleed. Patient now s/p 3x clips and epi. Since 7/18 patient has continued to have melanotic stools with hemoglobin drops concerning for slow rebleeding.     Plan:     NEUROLOGIC  # Baseline AOx3  Course:  - intubated 7/9, extubated 7/15  - reintubated 7/18 for EGD, extubated 7/22  - not sedated    CARDIOVASCULAR  # hemorrhagic shock   Course:  - Per spouse, pt's baseline BP is a little bit over 100  - 7/9: RRT called 7/9 for hypotension; 2/2 to Upper GI bleeds, urgently took to EGD, found bleeding ulcer that was not well controlled, underwent IR GDA embolization  - 7/18, patient had large melanotic stool with Hgb drop from 10.1-7.1, patient was restarted on henny, vaso, and levo for reintubation for GI scope; s/p 2pRBC, 1 platelet  - Total transfusions: 15pRBC, 4 platelet, 1 FFP  Findings:  - AM cortisol 17 (7/5), AM cortisol 37.1 (7/11)  - POCUS ECHO (7/16) showed no cardiogenic shock, no tamponade, low SV indeterminate IVC, irregular B lines but not concerning for pulmonary edema, and some subdiaphragmatic fluid  Plan:  - Continue to monitor CBC  - Transfuse for Hgb <8, (goal given recent stent)  - Add droxidopa 100 mg q8, once given one dose, stop vaso and observe off pressors  - Continue to hold Plavix 75 mg  - Continue midodrine 30mg q8  - Continue hydrocortisone 50mg q6 for refractory shock    #CAD s/p PCI x3, most recent stent 6/12/24, NURIA to pLAD  Course:  - 7/13 restarted plavix for new stent (6/12/24) per GI   - 7/18 held plavix due to bleeding  - 7/22 started low-dose aspirin as safer alternative to plavix per cardiology  Plan:  - Continue to hold Plavix 75 mg  - Continue atorvastatin 80 mg daily  - D/c aspirin 81 mg daily given bleed 7/23    #PAD  # A-fib on eliquis  May have been worsened because of GI bleed  - s/p successful DCCV 10/31   - 7/16 In RVR, 2 doses of amio overnight; Cards recommended resuming home dose sotalol 40 mg PO (qTC wnl), s/p 1 dose sotalol but RVR persisted  - 7/16 Amio restarted amio 150 mg followed by loading dose of 1 mg/min for 6 hours and 0.5 mg/min for 18 hours  - 7/17 received amio 150 overnight   - 7/17 Amio load started (amio 400 mg q8 for 12 doses)  - 7/22 Added digoxin load (500mcg followed by 250mcg q6) for persistent HR 130s over past week  - 7/23 Dig level 1.3, patient's HR back in 60s in the afternoon  Plan:  - Hold eliquis   - Continue amio 200 mg daily  - No need to give more digoxin at this time  - Hold home sotalol  - Cardiology following, advises against chemical cardioversion off AC if possible    PULMONARY  #Intubated for airway protection prior to EGD/IR embolization (Resolved)  Course:  - Intubated 7/9  - Extubated 7/15  - Intubated again 7/18  - Extubated 7/22    #Sputum production  - Patient complaining of cough and phlegm after previous intubation  - Patient able to self-suction (7/17)  Plan:  - Airway clearance protocol    RENAL/  #RAZIA   #ATN i.s.o hypotension  #metabolic acidosis   Patient receiving one-time doses of bumex and lasix for diuresis for volume overload. Appears to be volume overloaded but intravascularly depleted.  Findings:  - pH 7.37 (7/16)  - Cr 1.53 (7/16)  - AG 18 (7/17) with high glucose, concern for DKA but ABG pH 7.39, pCO2 26 pO2 87, pHCO3 16, Beta hydroxybutyrate 0 (7/17)  - AG 13 (7/18)  - ABG pH 7.33, pCO2 30, pO2 136, HCO3 16 (7/21)  Plan:  - f/u nephro recs  - Continue sodium bicarb 1950 mg tid, may need a bicarb drip, per nephro, but will not start bicarb drip now  - Per nephro, patient is not a dialysis candidate  - trend BUN/Cr   - monitor Is and Os     #Hypernatremia  Na 150 (7/23)  Course:  - 7/22: s/p lasix 40mg and metolazone 5mg   - FWD: 4.5L  - Gave D5W 1L bolus 7/23  Plan:  - Continue free water    #Urinary retention  Plan:  - Started Doxazosin 2 mg daily (7/15)  - Monitor I/Os  - Ivory placed by urology 7/19, making urine    GASTROINTESTINAL  #Upper GI Bleed  Course:  - 7/9 CT A/P - Active bleeding in the third portion of the duodenum. Progression of liver metastases.  - 7/9 EGD showed esophagitis, One non-bleeding duodenal ulcer with a clean ulcer base (Arjun Class III). One oozing duodenal ulcer with spurting hemorrhage (Arjun Class Ia). Treatment not successful. Treated with bipolar cautery.  - 7/9 s/p IR GDA embolization  - 7/12 s/p 5 units pRBC prior to MICU and then 4:2:2  - 7/18 Patient had melanotic stool overnight and hemoglobin drop 10.1 to 7.1 in 24 hours; Reached out to surgery in case of recurrent bleeding per GI if unable to find/contain source during re-scope  - 7/18 EGD showing duodenal bleed s/p 3 clips + epi  - Total EGD: 3  - 7/19 Bloody BM likely old blood  - Patient continues to have melatonic stools with Hgb drop  - 7/20 CTA IMPRESSION: "No evidence of GI bleed. Interval endoscopic versus surgical intervention with metallic streak artifact suggestive of surgical clips in the region of the proximal one third portions of the duodenum. Evaluation of the previously seen hematoma is limited secondary to this artifact. Moderate bilateral pleural effusions and associated compressive atelectasis, right greater than left, overall slightly increased from previous CT. Anasarca."  Plan:  - Continue pantoprazole 40 mg IV q12  - monitor Hgb, transfuse as needed for Hgb <8  - hold eliquis   - continue to f/u with GI and IR     - Per GI, "Will need PPI BID for 8 weeks for grade D esophagitis and PUD"    - Per GI, high risk of 30 day rebleed (>10-20% risk)  - No acute IR intervention per IR because no active area of extravasation on imaging (7/21)  - No acute GI intervention per GI (7/21)  - Surgery evaluated patient 7/23, may be a candidate for duodenotomy to oversew ulcers, but patient cannot be on aspirin  - As patient is s/p aspirin x 1 dose (7/23), will continue to update surgery and reach out in a couple days to re-evaluate  - f/u gastrin level, per surgery  - Check Helicobacter pylori antigen    #Nutrition  Course:  - placed OG tube per GI and started ensure clear liquid (see note from RD 7/13/24)  - OG tube removed during extubation (7/15/24)  -dysphagia screen failed  - FEES (7/17) failed  - ENT consulted for vallecular lesion, diagnosed vallecular cyst, no inpatient intervention required, outpatient f/u  Plan:  - NPO today  - Reassess for tube feeds tomorrow  - When ready to start PO diet, speech & swallow evaluation (do not have to do nurse dysphagia screen first)    #Diarrhea  Course:  - stopped maintenance fluids   - positive for c. diff (7/14)  - started vancomycin (7/14 -7/22) active infection dose, started vancomycin prophylaxis (7/24-)   - having worsening diarrhea, no melena (7/16)  Plan:  - Start prophylactic dose of vancomycin q12 today  - Fluids as necessary to compensate for volume loss 2/2 diarrhea    #Transaminitis (resolving)  Findings:  - Bili 1.1 (7/16) -> 1.3 (7/17)  - Alk P 359 (7/16)-> 484 (7/17)  -  (7/16)->129 (7/17)  - ALT 67 (7/16)-> 72 (7/17)  - RUQ US (7/17): known liver mets, no acute findings  Plan:  - Continue atorvastatin 80 mg daily    INFECTIOUS DISEASE  #leukocytosis  Findings/Course:  - 7/10 BCx - NGTD  - Sputum Cx from ETT 7/13 showed numerous gram neg krishna (Klebsiella oxytoca/raoultella ornithinolytica)  - Started zosyn for empiric treatment (7/11 - 7/18)   - GI panel: previously indeterminate norovirus (7/8), negative (7/13)     - per ID- no intervention needed regarding the PCR results  - c. diff + (7/14)  - started vancomycin for c. diff (7/14- )  - febrile 7/16 overnight  - currently afebrile 7/17  - MRSA Swab: negative  - Staph aureus PCR positive  - Culture resistant to Zosyn (7/18)  - Switched Zosyn to Meropenem (7/18- )  - Patient febrile overnight (7/19)  Plan:  - Start vancomycin 125 mg q12 for c. diff, prophylaxis  - Continue meropenem 1000 mg q8 until 7/25 (7 day course)  - Continue to monitor for fevers    ENDOCRINE  #adrenal insufficiency   Findings:  -7/5 cortisol 17   - 7/11 cortisol 37.1   Plan:  - Continue hydrocortisone 50mg q6  - Monitor sugars with steroid    HEMATOLOGY/ONCOLOGY   # neuroendocrine tumor   - was on lanreotide then had POD in liver and started on peptide receptor radiotherapy (PRRT)- typically given every 8 weeks for 4 doses. He last received it 10/20/2023   - PET 5/28/24 shows new somatostatin avid osseous lesions; decreased intensely avid right supradiaphragmatic and upper abdominal nodes, suspicious for mets  Plan:  - per heme/onc:    - can resume octreotide 150 mcg bid once infection r/o      - can check factor levels V, VIII, X     - "-- f/u with Dr. Sophia Prasad at [facility] [place] after discharge, no plan for treatment inpatient, if clinically improves/stabilizes then can be considered for treatment outpatient"    #Normocytic anemia   - Pt with low Hgb 7-9  - ISO of GI bleed hx and continued melanotic BMs, there is c/o of rebleed; per GI, high risk of rebleeding, currently seems to be slow bleeding   Plan:  - Surgery and GI made aware & are following   - Continue CBC q6    #DVT ppx  - SCDs  - LE duplex (7/17): negative for DVT    SKINS:   - Lines/Tubes - PIV, cordis replaced with central line (7/14-7/23), A line (7/20), Subclavian central line (7/24- )    Ethics:   - Full Code   - GOC 7/12, spoke to spouse (Yamilet) over phone with palliative care team, discussed GOC again on 7/16    Consults this admission: GI, Heme Onc, Palliative, Endocrinology, Cardiology, Nephrology, Interventional Radiology, General Surgery   Assessment	  Assessment: 77 yo male with metastatic neuroendocrine pancreatic cancer (tx on hold) and PMH of CAD s/p PCI x3 with most recent stent 6/12/24 on Plavix and eliquis , chronic Afib on eliquis, orthostatic hypotension on midodrine, PAD , recent admission for dx cath on 6/24/24 presented from PCP's office for hypotension despite taking AM midodrine dose on 7/2, admitted to medicine for symptomatic hypotension and RAZIA. Per chart, on 7/8 PM, pt was noted to have acute onset of melena x5 and coffee ground emesis x2-3. RRT was called on 7/9 AM for hypotension, hgb dropped from 9.2 to 7.4 (admission baseline 10-11), FOBT +, pt was started on PPI IV and transfused 1 unit of pRBC. GI was consulted and underwent EGD on 7/9 afternoon. MICU was consulted for uncontrolled bleeding during EGD. During EGD, pt became hypotensive and was given phenylephrine, had A-fib with RVR s/p amiodarone. Now s/p IR GDA embolization, admitted to MICU for further moniring i.s.o hemorrhagic shock 2/2 GI bleed. On repeat EGD, oozing but no active bleeding was noted. Hospital course c/b c.diff, and patient has been placed on vancomycin. Patient had melanotic stool 7/18 with Hgb drop 10.1 to 7.1 and received 2 units pRBC and 1 unit platelets. Patient was reintubated and underwent EGD 7/18 afternoon. GI found duodenal bleed. Patient now s/p 3x clips and epi. Since 7/18 patient has continued to have melanotic stools with hemoglobin drops concerning for slow rebleeding.     Plan:     NEUROLOGIC  # Baseline AOx3  Course:  - intubated 7/9, extubated 7/15  - reintubated 7/18 for EGD, extubated 7/22  - not sedated    CARDIOVASCULAR  # hemorrhagic shock   Course:  - Per spouse, pt's baseline BP is a little bit over 100  - 7/9: RRT called 7/9 for hypotension; 2/2 to Upper GI bleeds, urgently took to EGD, found bleeding ulcer that was not well controlled, underwent IR GDA embolization  - 7/18, patient had large melanotic stool with Hgb drop from 10.1-7.1, patient was restarted on henny, vaso, and levo for reintubation for GI scope; s/p 2pRBC, 1 platelet  - Total transfusions: 15pRBC, 4 platelet, 1 FFP  Findings:  - AM cortisol 17 (7/5), AM cortisol 37.1 (7/11)  - POCUS ECHO (7/16) showed no cardiogenic shock, no tamponade, low SV indeterminate IVC, irregular B lines but not concerning for pulmonary edema, and some subdiaphragmatic fluid  Plan:  - Continue to monitor CBC  - Transfuse for Hgb <8, (goal given recent stent)  - Add droxidopa 100 mg q8, once given one dose, stop vaso and observe off pressors  - Continue to hold Plavix 75 mg  - Continue midodrine 30mg q8  - Continue hydrocortisone 50mg q6 for refractory shock    #CAD s/p PCI x3, most recent stent 6/12/24, NURIA to pLAD  Course:  - 7/13 restarted plavix for new stent (6/12/24) per GI, 7/18 held plavix due to bleeding  - 7/22 started low-dose aspirin as safer alternative to plavix per cardiology, but d/c 7/23 as patient bled again  Plan:  - Continue to hold Plavix 75 mg and aspirin 81 mg  - Continue atorvastatin 80 mg daily    #PAD  # A-fib on eliquis  May have been worsened because of GI bleed  - s/p successful DCCV 10/31   - 7/16 In RVR, 2 doses of amio overnight; Cards recommended resuming home dose sotalol 40 mg PO (qTC wnl), s/p 1 dose sotalol but RVR persisted  - 7/16 Started amio loading  - 7/22 Added digoxin load for persistent HR 130s over past week  - 7/23 Dig level 1.3, patient's HR back in 60s in the afternoon  Plan:  - Hold eliquis   - Continue amio 200 mg daily  - Start digoxin maintenance dose 125 mcg daily  - Hold home sotalol  - Cardiology following, advises against chemical cardioversion off AC if possible    PULMONARY  #Intubated for airway protection prior to EGD/IR embolization (Resolved)  Course:  - Intubated 7/9  - Extubated 7/15  - Intubated again 7/18  - Extubated 7/22    #Sputum production  - Patient complaining of cough and phlegm after previous intubation  - Patient able to self-suction (7/17)  Plan:  - Airway clearance protocol    RENAL/  #RAZIA   #ATN i.s.o hypotension  #metabolic acidosis   Patient receiving one-time doses of bumex and lasix for diuresis for volume overload. Appears to be volume overloaded but intravascularly depleted.  Findings:  - pH 7.37 (7/16)  - Cr 1.53 (7/16)  - AG 18 (7/17) with high glucose, concern for DKA but ABG pH 7.39, pCO2 26 pO2 87, pHCO3 16, Beta hydroxybutyrate 0 (7/17)  - AG 13 (7/18)  - ABG pH 7.33, pCO2 30, pO2 136, HCO3 16 (7/21)  Plan:  - f/u nephro recs  - Continue sodium bicarb 1950 mg tid, may need a bicarb drip, per nephro, but will not start bicarb drip now  - Per nephro, patient is not a dialysis candidate  - trend BUN/Cr   - monitor Is and Os     #Hypernatremia  Patient having a gradually increasing sodium since transfer to the MICU. Current Na 150 (7/23)  Course:  - 7/22: s/p lasix 40mg and metolazone 5mg   - 7/23: s/p D5W 1L bolus  Plan:  - Increase free water 300mL q6  - Decrease sodium bicarb to 750 mg tid as it may be a contributing factor  - Lasix 40mg IV and Diuril 250mg IV today to diurese  - Recheck BMP this afternoon    #Urinary retention  Plan:  - Started Doxazosin 2 mg daily (7/15)  - Monitor I/Os  - Ivory placed by urology 7/19, making urine    GASTROINTESTINAL  #Upper GI Bleed  Course:  - 7/9 CT A/P - Active bleeding in the third portion of the duodenum. Progression of liver metastases.  - 7/9 EGD showed esophagitis, One non-bleeding duodenal ulcer with a clean ulcer base (Arjun Class III). One oozing duodenal ulcer with spurting hemorrhage (Arjun Class Ia). Treatment not successful. Treated with bipolar cautery.  - 7/9 s/p IR GDA embolization  - 7/12 s/p 5 units pRBC prior to MICU and then 4:2:2  - 7/18 Patient had melanotic stool overnight and hemoglobin drop 10.1 to 7.1 in 24 hours; Reached out to surgery in case of recurrent bleeding per GI if unable to find/contain source during re-scope  - 7/18 EGD showing duodenal bleed s/p 3 clips + epi  - Total EGD: 3  - 7/19 Bloody BM likely old blood  - Patient continues to have melatonic stools with Hgb drop  - 7/20 CTA IMPRESSION: "No evidence of GI bleed. Interval endoscopic versus surgical intervention with metallic streak artifact suggestive of surgical clips in the region of the proximal one third portions of the duodenum. Evaluation of the previously seen hematoma is limited secondary to this artifact. Moderate bilateral pleural effusions and associated compressive atelectasis, right greater than left, overall slightly increased from previous CT. Anasarca."  Plan:  - Continue pantoprazole 40 mg IV q12  - monitor Hgb, transfuse as needed for Hgb <8  - hold eliquis   - continue to f/u with GI and IR     - Per GI, "Will need PPI BID for 8 weeks for grade D esophagitis and PUD"    - Per GI, high risk of 30 day rebleed (>10-20% risk)  - No acute IR intervention per IR because no active area of extravasation on imaging (7/21)  - No acute GI intervention per GI (7/21)  - Surgery evaluated patient 7/23, may be a candidate for duodenotomy to oversew ulcers, but patient cannot be on aspirin  - As patient is s/p aspirin x 1 dose (7/23), will continue to update surgery and reach out in a couple days to re-evaluate  - f/u gastrin level, per surgery  - Check Helicobacter pylori antigen    #Nutrition  Course:  - placed OG tube per GI and started ensure clear liquid (see note from RD 7/13/24)  - OG tube removed during extubation (7/15/24)  -dysphagia screen failed  - FEES (7/17) failed  - ENT consulted for vallecular lesion, diagnosed vallecular cyst, no inpatient intervention required, outpatient f/u  Plan:  - Start trickle feeds 10 cc/hr  - When ready to start PO diet, speech & swallow evaluation (do not have to do nurse dysphagia screen first), hold off for today    #Diarrhea (improving)  Course:  - c. diff + (7/14)  - started vancomycin (7/14 -7/22) active infection dose, started vancomycin prophylaxis (7/24-)   - having worsening diarrhea, no melena (7/16)  Plan:  - Start prophylactic dose of vancomycin q12 today  - Fluids as necessary to compensate for volume loss 2/2 diarrhea    #Transaminitis (resolving)  Findings:  - Bili 1.1 (7/16) -> 1.3 (7/17)  - Alk P 359 (7/16)-> 484 (7/17)  -  (7/16)->129 (7/17)  - ALT 67 (7/16)-> 72 (7/17)  - RUQ US (7/17): known liver mets, no acute findings  Plan:  - Continue atorvastatin 80 mg daily    INFECTIOUS DISEASE  #leukocytosis  Findings/Course:  - 7/10 BCx - NGTD  - Sputum Cx from ETT 7/13 showed numerous gram neg krishna (Klebsiella oxytoca/raoultella ornithinolytica)  - Started zosyn for empiric treatment (7/11 - 7/18)   - GI panel: previously indeterminate norovirus (7/8), negative (7/13)     - per ID- no intervention needed regarding the PCR results  - c. diff + (7/14)  - started vancomycin for c. diff (7/14- )  - febrile 7/16 overnight  - currently afebrile 7/17  - MRSA Swab: negative  - Staph aureus PCR positive  - Culture resistant to Zosyn (7/18)  - Switched Zosyn to Meropenem (7/18- )  - Patient febrile overnight (7/19)  Plan:  - Start vancomycin 125 mg q12 for c. diff, prophylaxis  - Continue meropenem 1000 mg q8 until 7/25 (7 day course)  - Continue to monitor for fevers    ENDOCRINE  #adrenal insufficiency   Findings:  -7/5 cortisol 17   - 7/11 cortisol 37.1   Plan:  - Continue hydrocortisone 50mg q6  - Monitor sugars with steroid    HEMATOLOGY/ONCOLOGY   # neuroendocrine tumor   - was on lanreotide then had POD in liver and started on peptide receptor radiotherapy (PRRT)- typically given every 8 weeks for 4 doses. He last received it 10/20/2023   - PET 5/28/24 shows new somatostatin avid osseous lesions; decreased intensely avid right supradiaphragmatic and upper abdominal nodes, suspicious for mets  Plan:  - per heme/onc:    - can resume octreotide 150 mcg bid once infection r/o      - can check factor levels V, VIII, X     - "-- f/u with Dr. Sophia Prasad at [facility] [place] after discharge, no plan for treatment inpatient, if clinically improves/stabilizes then can be considered for treatment outpatient"    #Normocytic anemia   - Pt with low Hgb 7-9  - ISO of GI bleed hx and continued melanotic BMs, there is c/o of rebleed; per GI, high risk of rebleeding, currently seems to be slow bleeding   Plan:  - Surgery and GI made aware & are following   - Check CBC/BMP q8    #DVT ppx  - SCDs  - LE duplex (7/17): negative for DVT    SKINS:   - Lines/Tubes - PIV, cordis replaced with central line (7/14-7/23), A line (7/20), Subclavian central line (7/24- )    Ethics:   - Full Code   - GOC 7/12, spoke to spouse (Yamilet) over phone with palliative care team, discussed GOC again on 7/16    Consults this admission: GI, Heme Onc, Palliative, Endocrinology, Cardiology, Nephrology, Interventional Radiology, General Surgery   Assessment	  Assessment: 79 yo male with metastatic neuroendocrine pancreatic cancer (tx on hold) and PMH of CAD s/p PCI x3 with most recent stent 6/12/24 on Plavix and eliquis , chronic Afib on eliquis, orthostatic hypotension on midodrine, PAD , recent admission for dx cath on 6/24/24 presented from PCP's office for hypotension despite taking AM midodrine dose on 7/2, admitted to medicine for symptomatic hypotension and RAZIA. Per chart, on 7/8 PM, pt was noted to have acute onset of melena x5 and coffee ground emesis x2-3. RRT was called on 7/9 AM for hypotension, hgb dropped from 9.2 to 7.4 (admission baseline 10-11), FOBT +, pt was started on PPI IV and transfused 1 unit of pRBC. GI was consulted and underwent EGD on 7/9 afternoon. MICU was consulted for uncontrolled bleeding during EGD. During EGD, pt became hypotensive and was given phenylephrine, had A-fib with RVR s/p amiodarone. Now s/p IR GDA embolization, admitted to MICU for further moniring i.s.o hemorrhagic shock 2/2 GI bleed. On repeat EGD, oozing but no active bleeding was noted. Hospital course c/b c.diff, and patient has been placed on vancomycin. Patient had melanotic stool 7/18 with Hgb drop 10.1 to 7.1 and received 2 units pRBC and 1 unit platelets. Patient was reintubated and underwent EGD 7/18 afternoon. GI found duodenal bleed. Patient now s/p 3x clips and epi. Since 7/18 patient has continued to have melanotic stools with hemoglobin drops concerning for slow rebleeding.     Plan:     NEUROLOGIC  # Baseline AOx3  Course:  - intubated 7/9, extubated 7/15  - reintubated 7/18 for EGD, extubated 7/22  - not sedated    CARDIOVASCULAR  # hemorrhagic shock  Course:  - Per spouse, pt's baseline BP is a little bit over 100  - 7/9: RRT called 7/9 for hypotension; 2/2 to Upper GI bleeds, urgently took to EGD, found bleeding ulcer that was not well controlled, underwent IR GDA embolization  - 7/18, patient had large melanotic stool with Hgb drop from 10.1-7.1, patient was restarted on henny, vaso, and levo for reintubation for GI scope; s/p 2pRBC, 1 platelet  - Total transfusions: 15pRBC, 4 platelet, 1 FFP  Findings:  - AM cortisol 17 (7/5), AM cortisol 37.1 (7/11)  - POCUS ECHO (7/16) showed no cardiogenic shock, no tamponade, low SV indeterminate IVC, irregular B lines but not concerning for pulmonary edema, and some subdiaphragmatic fluid  Plan:  - Continue to monitor CBC  - Transfuse for Hgb <8, (goal given recent stent)  - Add droxidopa 100 mg q8, once given one dose, stop vaso and observe off pressors; patient tolerated 20 minutes off vaso today  - Continue to hold Plavix 75 mg  - Continue midodrine 30mg q8  - Continue hydrocortisone 50mg q6 for refractory shock    #CAD s/p PCI x3, most recent stent 6/12/24, NURIA to pLAD (stable)  Course:  - 7/13 restarted plavix for new stent (6/12/24) per GI, 7/18 held plavix due to bleeding  - 7/22 started low-dose aspirin as safer alternative to plavix per cardiology, but d/c 7/23 as patient bled again  Plan:  - Continue to hold Plavix 75 mg and aspirin 81 mg  - Continue atorvastatin 80 mg daily    #PAD  # A-fib on eliquis (improving)  May have been worsened because of GI bleed  - s/p successful DCCV 10/31   - 7/16 In RVR, 2 doses of amio overnight; Cards recommended resuming home dose sotalol 40 mg PO (qTC wnl), s/p 1 dose sotalol but RVR persisted  - 7/16 Started amio loading  - 7/22 Added digoxin load for persistent HR 130s over past week  - 7/23 Dig level 1.3, patient's HR back in 60s in the afternoon  Plan:  - Hold eliquis   - Continue amio 200 mg daily  - Start digoxin maintenance dose 125 mcg daily  - Hold home sotalol  - Cardiology following, advises against chemical cardioversion off AC if possible    PULMONARY  #Intubated for airway protection prior to EGD/IR embolization (Resolved)  Course:  - Extubated 7/22, SpO2 100% on RA    RENAL/  #RAZIA (resolved)  - Cr peaked at 3.38 (7/11)  - Currently Cr 1.13 ( 7/24)  Plan:  - Continue to monitor Cr    #ATN i.s.o hypotension  #metabolic acidosis (improving)  Patient was in a state of metabolic acidosis around 7/16, but recent ABG pH in normal range. However, patient has been bicarb deficient throughout the admission.   Plan:  - f/u nephro recs  - Decrease bicarb to 650mg tid considering hypernatremia  - Per nephro, patient is not a dialysis candidate  - trend BUN/Cr   - monitor Is and Os     #Hypernatremia (worsening)  Patient having a gradually increasing sodium since transfer to the MICU. Current Na 150 (7/23)  Course:  - 7/22: s/p lasix 40mg and metolazone 5mg   - 7/23: s/p D5W 1L bolus  Plan:  - Increase free water 300mL q6  - Decrease sodium bicarb to 650 mg tid as it may be a contributing factor  - Lasix 40mg IV and Diuril 250mg IV today to hortenciaurese  - Recheck BMP this afternoon    #Urinary retention (improving)  Plan:  - Started Doxazosin 2 mg daily (7/15)  - Monitor I/Os  - Ivory placed by urology 7/19, making urine    GASTROINTESTINAL  #Upper GI Bleed (worsening)  Course:  - 7/9 CT A/P - Active bleeding in the third portion of the duodenum. Progression of liver metastases.  - 7/9 EGD showed esophagitis, One non-bleeding duodenal ulcer with a clean ulcer base (Arjun Class III). One oozing duodenal ulcer with spurting hemorrhage (Arjun Class Ia). Treatment not successful. Treated with bipolar cautery.  - 7/9 s/p IR GDA embolization  - 7/18 Patient had melanotic stool with hemoglobin drop 10.1 to 7.1; EGD showing duodenal bleed s/p 3 clips + epi  - CTA (7/20) showed no active bleed  - Total EGDs: 3  Plan:  - Continue pantoprazole 40 mg IV q12  - monitor Hgb, transfuse as needed for Hgb <8  - continue to f/u with GI and IR     - Per GI, "Will need PPI BID for 8 weeks for grade D esophagitis and PUD"    - Per GI, high risk of 30 day rebleed (>10-20% risk)  - No acute IR intervention per IR because no active area of extravasation on imaging (7/21)  - No acute GI intervention per GI (7/21)  - Surgery evaluated patient 7/23, may be a candidate for duodenotomy to oversew ulcers, but patient cannot be on aspirin  - As patient is s/p aspirin x 1 dose (7/23), will continue to update surgery and reach out in a couple days to re-evaluate  - Check Helicobacter pylori antigen    #Nutrition  Course:  - FEES (7/17) failed  - ENT consulted for vallecular lesion, diagnosed vallecular cyst, no inpatient intervention required, outpatient f/u  - Patient currently extubated with NG tube (7/24)  Plan:  - Start trickle feeds 10 cc/hr  - When ready to start PO diet, speech & swallow evaluation (do not have to do nurse dysphagia screen first), hold off for today    #Diarrhea (improving)  Course:  - c. diff + (7/14)  - started vancomycin (7/14 -7/22) active infection dose, started vancomycin prophylaxis (7/24-)   - having worsening diarrhea, no melena (7/16)  Plan:  - Start prophylactic dose of vancomycin q12 today  - Fluids as necessary to compensate for volume loss 2/2 diarrhea    #Transaminitis (stable)  Findings:  - Bili 1.1 (7/16) -> 1.3 (7/17)  - Alk P 359 (7/16)-> 484 (7/17)  -  (7/16)->129 (7/17)  - ALT 67 (7/16)-> 72 (7/17)  - RUQ US (7/17): known liver mets, no acute findings  Plan:  - Continue atorvastatin 80 mg daily    INFECTIOUS DISEASE  #leukocytosis (improving)  Cultures:  - Blood cultures negative throughout admission  - Sputum Cx from ETT 7/13 showed numerous gram neg krishna (Klebsiella oxytoca/raoultella ornithinolytica)  - GI PCR negative  - MRSA swab negative  Antibiotics Course:  - Vancomycin (7/14 - )  - Zosyn (7/11-7/18), resistance shown, switched to meropenem  - Meropenem (7/18- )  Plan:  - Start vancomycin 125 mg q12 for c. diff, prophylaxis  - Continue meropenem 1000 mg q8 until 7/25 (7 day course)  - Continue to monitor for fevers    ENDOCRINE  #adrenal insufficiency  Findings:  -7/5 cortisol 17   - 7/11 cortisol 37.1   Plan:  - Continue hydrocortisone 50mg q6  - Monitor sugars with steroid    HEMATOLOGY/ONCOLOGY   # neuroendocrine tumor (unchanged)  - was on lanreotide then had POD in liver and started on peptide receptor radiotherapy (PRRT)- typically given every 8 weeks for 4 doses. He last received it 10/20/2023   - PET 5/28/24 shows new somatostatin avid osseous lesions; decreased intensely avid right supradiaphragmatic and upper abdominal nodes, suspicious for mets  - Gastrin level 5509  Plan:  - per heme/onc:    - can resume octreotide 150 mcg bid once infection r/o      - can check factor levels V, VIII, X     - "-- [place] [facility] [place] plan for treatment inpatient, if clinically improves/stabilizes then can be considered for treatment outpatient"    #Normocytic anemia (worsening)  - Pt with low Hgb 7-9  - ISO of GI bleed hx and continued melanotic BMs, there is c/o of rebleed; per GI, high risk of rebleeding, currently seems to be slow bleeding   Plan:  - Surgery and GI made aware & are following   - Check CBC/BMP q8    #DVT ppx  - SCDs  - LE duplex (7/17): negative for DVT    SKINS:   - Lines/Tubes - PIV, cordis replaced with central line (7/14-7/23), A line (7/20), Subclavian central line (7/24- )    Ethics:   - Full Code   - GOC 7/12, spoke to spouse (Yamilet) over phone with palliative care team, discussed GOC again on 7/16    Consults this admission: GI, Heme Onc, Palliative, Endocrinology, Cardiology, Nephrology, Interventional Radiology, General Surgery   Assessment	  Assessment: 79 yo male with metastatic neuroendocrine pancreatic cancer (tx on hold) and PMH of CAD s/p PCI x3 with most recent stent 6/12/24 on Plavix and eliquis , chronic Afib on eliquis, orthostatic hypotension on midodrine, PAD , recent admission for dx cath on 6/24/24 presented from PCP's office for hypotension despite taking AM midodrine dose on 7/2, admitted to medicine for symptomatic hypotension and RAZIA. Per chart, on 7/8 PM, pt was noted to have acute onset of melena x5 and coffee ground emesis x2-3. RRT was called on 7/9 AM for hypotension, hgb dropped from 9.2 to 7.4 (admission baseline 10-11), FOBT +, pt was started on PPI IV and transfused 1 unit of pRBC. GI was consulted and underwent EGD on 7/9 afternoon. MICU was consulted for uncontrolled bleeding during EGD. During EGD, pt became hypotensive and was given phenylephrine, had A-fib with RVR s/p amiodarone. Now s/p IR GDA embolization, admitted to MICU for further moniring i.s.o hemorrhagic shock 2/2 GI bleed. On repeat EGD, oozing but no active bleeding was noted. Hospital course c/b c.diff, and patient has been placed on vancomycin. Patient had melanotic stool 7/18 with Hgb drop 10.1 to 7.1 and received 2 units pRBC and 1 unit platelets. Patient was reintubated and underwent EGD 7/18 afternoon. GI found duodenal bleed. Patient now s/p 3x clips and epi. Since 7/18 patient has continued to have melanotic stools with hemoglobin drops concerning for slow rebleeding.     Plan:     NEUROLOGIC  # Baseline AOx3  Course:  - intubated 7/9, extubated 7/15  - reintubated 7/18 for EGD, extubated 7/22  - not sedated    CARDIOVASCULAR  # hemorrhagic shock  Course:  - Per spouse, pt's baseline BP is a little bit over 100  - 7/9: RRT called 7/9 for hypotension; 2/2 to Upper GI bleeds, urgently took to EGD, found bleeding ulcer that was not well controlled, underwent IR GDA embolization  - 7/18, patient had large melanotic stool with Hgb drop from 10.1-7.1, patient was restarted on henny, vaso, and levo for reintubation for GI scope; s/p 2pRBC, 1 platelet  - Total transfusions: 12pRBC, 4 platelet, 3 FFP  Findings:  - AM cortisol 17 (7/5), AM cortisol 37.1 (7/11)  - POCUS ECHO (7/16) showed no cardiogenic shock, no tamponade, low SV indeterminate IVC, irregular B lines but not concerning for pulmonary edema, and some subdiaphragmatic fluid  Plan:  - Continue to monitor CBC  - Transfuse for Hgb <8, (goal given recent stent)  - Add droxidopa 100 mg q8, once given one dose, stop vaso and observe off pressors; patient tolerated 20 minutes off vaso today  - Continue to hold Plavix 75 mg  - Continue midodrine 30mg q8  - Continue hydrocortisone 50mg q6 for refractory shock    #CAD s/p PCI x3, most recent stent 6/12/24, NURIA to pLAD (stable)  Course:  - 7/13 restarted plavix for new stent (6/12/24) per GI, 7/18 held plavix due to bleeding  - 7/22 started low-dose aspirin as safer alternative to plavix per cardiology, but d/c 7/23 as patient bled again  Plan:  - Continue to hold Plavix 75 mg and aspirin 81 mg  - Continue atorvastatin 80 mg daily    #PAD  # A-fib on eliquis (improving)  May have been worsened because of GI bleed  - s/p successful DCCV 10/31   - 7/16 In RVR, 2 doses of amio overnight; Cards recommended resuming home dose sotalol 40 mg PO (qTC wnl), s/p 1 dose sotalol but RVR persisted  - 7/16 Started amio loading  - 7/22 Added digoxin load for persistent HR 130s over past week  - 7/23 Dig level 1.3, patient's HR back in 60s in the afternoon  Plan:  - Hold eliquis   - Continue amio 200 mg daily  - Start digoxin maintenance dose 125 mcg daily  - Hold home sotalol  - Cardiology following, advises against chemical cardioversion off AC if possible    PULMONARY  #Intubated for airway protection prior to EGD/IR embolization (Resolved)  Course:  - Extubated 7/22, SpO2 100% on RA    RENAL/  #RAZIA (resolved)  - Cr peaked at 3.38 (7/11)  - Currently Cr 1.13 ( 7/24)  Plan:  - Continue to monitor Cr    #ATN i.s.o hypotension  #metabolic acidosis (improving)  Patient was in a state of metabolic acidosis around 7/16, but recent ABG pH in normal range. However, patient has been bicarb deficient throughout the admission.   Plan:  - f/u nephro recs  - Decrease bicarb to 650mg tid considering hypernatremia  - Per nephro, patient is not a dialysis candidate  - trend BUN/Cr   - monitor Is and Os     #Hypernatremia (worsening)  Patient having a gradually increasing sodium since transfer to the MICU. Current Na 150 (7/23)  Course:  - 7/22: s/p lasix 40mg and metolazone 5mg   - 7/23: s/p D5W 1L bolus  Plan:  - Increase free water 300mL q6  - Decrease sodium bicarb to 650 mg tid as it may be a contributing factor  - Lasix 40mg IV and Diuril 250mg IV today to hortenciaurese  - Recheck BMP this afternoon    #Urinary retention (improving)  Plan:  - Started Doxazosin 2 mg daily (7/15)  - Monitor I/Os  - Ivory placed by urology 7/19, making urine    GASTROINTESTINAL  #Upper GI Bleed (worsening)  Course:  - 7/9 CT A/P - Active bleeding in the third portion of the duodenum. Progression of liver metastases.  - 7/9 EGD showed esophagitis, One non-bleeding duodenal ulcer with a clean ulcer base (Arjun Class III). One oozing duodenal ulcer with spurting hemorrhage (Arjun Class Ia). Treatment not successful. Treated with bipolar cautery.  - 7/9 s/p IR GDA embolization  - 7/18 Patient had melanotic stool with hemoglobin drop 10.1 to 7.1; EGD showing duodenal bleed s/p 3 clips + epi  - CTA (7/20) showed no active bleed  - Total EGDs: 3  Plan:  - Continue pantoprazole 40 mg IV q12  - monitor Hgb, transfuse as needed for Hgb <8  - continue to f/u with GI and IR     - Per GI, "Will need PPI BID for 8 weeks for grade D esophagitis and PUD"    - Per GI, high risk of 30 day rebleed (>10-20% risk)  - No acute IR intervention per IR because no active area of extravasation on imaging (7/21)  - No acute GI intervention per GI (7/21)  - Surgery evaluated patient 7/23, may be a candidate for duodenotomy to oversew ulcers, but patient cannot be on aspirin  - As patient is s/p aspirin x 1 dose (7/23), will continue to update surgery and reach out in a couple days to re-evaluate  - Check Helicobacter pylori antigen    #Nutrition  Course:  - FEES (7/17) failed  - ENT consulted for vallecular lesion, diagnosed vallecular cyst, no inpatient intervention required, outpatient f/u  - Patient currently extubated with NG tube (7/24)  Plan:  - Start trickle feeds 10 cc/hr  - When ready to start PO diet, speech & swallow evaluation (do not have to do nurse dysphagia screen first), hold off for today    #Diarrhea (improving)  Course:  - c. diff + (7/14)  - started vancomycin (7/14 -7/22) active infection dose, started vancomycin prophylaxis (7/24-)   - having worsening diarrhea, no melena (7/16)  Plan:  - Start prophylactic dose of vancomycin q12 today  - Fluids as necessary to compensate for volume loss 2/2 diarrhea    #Transaminitis (improving)  Findings:  - Bili 1.1 (7/16) -> 1.3 (7/17) ->0.5 (7/24)  - Alk P 359 (7/16)-> 484 (7/17) -> 174 (7/24)  -  (7/16)->129 (7/17) -> 75 (7/24)  - ALT 67 (7/16)-> 72 (7/17) -> 30 (7/24)  - RUQ US (7/17): known liver mets, no acute findings  Plan:  - Continue atorvastatin 80 mg daily    INFECTIOUS DISEASE  #leukocytosis (improving)  Cultures:  - Blood cultures negative throughout admission  - Sputum Cx from ETT 7/13 showed numerous gram neg krishna (Klebsiella oxytoca/raoultella ornithinolytica)  - GI PCR negative  - MRSA swab negative  Antibiotics Course:  - Vancomycin (7/14 - )  - Zosyn (7/11-7/18), resistance shown, switched to meropenem  - Meropenem (7/18- )  Plan:  - Start vancomycin 125 mg q12 for c. diff, prophylaxis  - Continue meropenem 1000 mg q8 until 7/25 (7 day course)  - Continue to monitor for fevers    ENDOCRINE  #adrenal insufficiency  Findings:  -7/5 cortisol 17   - 7/11 cortisol 37.1   Plan:  - Continue hydrocortisone 50mg q6  - Monitor sugars with steroid    HEMATOLOGY/ONCOLOGY   # neuroendocrine tumor (unchanged)  - was on lanreotide then had POD in liver and started on peptide receptor radiotherapy (PRRT)- typically given every 8 weeks for 4 doses. He last received it 10/20/2023   - PET 5/28/24 shows new somatostatin avid osseous lesions; decreased intensely avid right supradiaphragmatic and upper abdominal nodes, suspicious for mets  - Gastrin level 5509  Plan:  - per heme/onc:    - can resume octreotide 150 mcg bid once infection r/o      - can check factor levels V, VIII, X     - "-- [place] [facility] [place] plan for treatment inpatient, if clinically improves/stabilizes then can be considered for treatment outpatient"    #Normocytic anemia (worsening)  - Pt with low Hgb 7-9  - ISO of GI bleed hx and continued melanotic BMs, there is c/o of rebleed; per GI, high risk of rebleeding, currently seems to be slow bleeding   Plan:  - Surgery and GI made aware & are following   - Check CBC/BMP q8    #DVT ppx  - SCDs  - LE duplex (7/17): negative for DVT    SKINS:   - Lines/Tubes - PIV, cordis replaced with central line (7/14-7/23), A line (7/20), Subclavian central line (7/24- )    Ethics:   - Full Code   - GOC 7/12, spoke to spouse (Yamilet) over phone with palliative care team, discussed GOC again on 7/16    Consults this admission: GI, Heme Onc, Palliative, Endocrinology, Cardiology, Nephrology, Interventional Radiology, General Surgery

## 2024-07-24 NOTE — PROGRESS NOTE ADULT - NS ATTEND AMEND GEN_ALL_CORE FT
appears still bleeding from gi source. surgery following. if infection ruled out can consider starting octretotide that may help with bleed as well   cont follow up with GI and surgery

## 2024-07-24 NOTE — PROGRESS NOTE ADULT - SUBJECTIVE AND OBJECTIVE BOX
DATE OF SERVICE: 07-24-24 @ 17:04    Patient is a 78y old  Male who presents with a chief complaint of hypotension (24 Jul 2024 11:30)      INTERVAL HISTORY: Feels ok.     REVIEW OF SYSTEMS:  CONSTITUTIONAL: No weakness  EYES/ENT: No visual changes;  No throat pain   NECK: No pain or stiffness  RESPIRATORY: No cough, wheezing; No shortness of breath  CARDIOVASCULAR: No chest pain or palpitations  GASTROINTESTINAL: No abdominal  pain. No nausea, vomiting, or hematemesis  GENITOURINARY: No dysuria, frequency or hematuria  NEUROLOGICAL: No stroke like symptoms  SKIN: No rashes    TELEMETRY Personally reviewed: AF  with several short runs of WCT  	  MEDICATIONS:  aMIOdarone    Tablet   Oral   aMIOdarone    Tablet 200 milliGRAM(s) Oral daily  digoxin     Tablet 125 MICROGram(s) Oral daily  doxazosin 2 milliGRAM(s) Oral at bedtime  droxidopa 100 milliGRAM(s) Oral every 8 hours  midodrine 30 milliGRAM(s) Oral every 8 hours  phenylephrine    Infusion 0.1 MICROgram(s)/kG/Min IV Continuous <Continuous>        PHYSICAL EXAM:  T(C): 36.6 (07-24-24 @ 16:00), Max: 36.9 (07-24-24 @ 00:00)  HR: 66 (07-24-24 @ 16:15) (60 - 89)  BP: --  RR: 23 (07-24-24 @ 16:15) (12 - 30)  SpO2: 100% (07-24-24 @ 16:15) (98% - 100%)  Wt(kg): --  I&O's Summary    23 Jul 2024 07:01  -  24 Jul 2024 07:00  --------------------------------------------------------  IN: 3733.6 mL / OUT: 1240 mL / NET: 2493.6 mL    24 Jul 2024 07:01  -  24 Jul 2024 17:05  --------------------------------------------------------  IN: 836.3 mL / OUT: 1451 mL / NET: -614.7 mL          Appearance: In no distress	  HEENT:    PERRL, EOMI	  Cardiovascular:  S1 S2, No JVD  Respiratory: Lungs clear to auscultation	  Gastrointestinal:  Soft, Non-tender, + BS	  Vascularature:  + edema of LE  Psychiatric: Appropriate affect   Neuro: no acute focal deficits                               6.3    11.31 )-----------( 113      ( 24 Jul 2024 11:43 )             19.2     07-24    150<H>  |  120<H>  |  50<H>  ----------------------------<  191<H>  3.4<L>   |  17<L>  |  1.13    Ca    7.4<L>      24 Jul 2024 11:43  Phos  3.1     07-24  Mg     2.0     07-24    TPro  4.2<L>  /  Alb  2.2<L>  /  TBili  0.5  /  DBili  x   /  AST  75<H>  /  ALT  30  /  AlkPhos  174<H>  07-24        Labs personally reviewed      ASSESSMENT/PLAN: 	    78-year-old male multiple medical problems history of hepatocellular carcinoma status post radiation therapy, AF s/p DCCV on Eliquis who was found to be hypotensive following NST. Patient has reported general weakness, fatigue, lightheadedness since being discharged from hospital. Reports mild chest discomfort in midsternal region. Reports dyspnea with minimal exertion. Also reports significant lack of appetite and poor PO intake. No dark or bloody stool, nausea or vomiting.       Problem/Plan - 1:  ·  Problem: Hypotension  ·  Plan: Continue with pressors in MICU  - Patient presents with hypotension and also RAZIA. Has known orthostatic hypotension.   - Patient and wife report decreased PO intake likely 2/2 malignancy.  - GIB 7/9, s/p 4 units prbc, IR for mesenteric embolization, now in MICU on pressors  - c/w Midodrine, droxidopa and IV pressors (wean as tolerated as per MICU)     Problem/Plan - 2:  ·  Problem: CAD.   ·  Plan: Recent PCI to pLAD in June 2023  - ECG non-ischemic  - Plavix held given large melena; ideally should be on Plavix but high risk to resume      Problem/Plan - 3:  ·  Problem: Atrial Fibrillation  - s/p succesful DCCV 10/31  - Hold Eliquis 5mg BID given GIB  -  c/w Amio 200mg TID per ICU  - c.w dig 125mcg PO daily  - However would advise against chemical cardioversion off AC if possible        Sulma Espinosa, ROSIO-NP   Hank Hayes DO [facility]  Cardiovascular Medicine  [address] [place] [address], [address]  Available through call or text on [employer]  Office: [phone]

## 2024-07-24 NOTE — PROGRESS NOTE ADULT - SUBJECTIVE AND OBJECTIVE BOX
Jodie Okeefe MD PGY-1  ---------------------------------------------------------------------------------------------  Patient is a 78y old  Male who presents with a chief complaint of hypotension (20 Jul 2024 22:17)    HPI:  79 yo male with PMH of CAD s/p PCI x3 with most recent stent 6/12/24 on Plavix, chronic Afib on eliquis, orthostatic hypotension on midodrine, PAD, 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 Patient states since discharge on this past Saturday, he continued to feel ongoing generalized weakness and dizziness with positional changes despite compliance with home midodrine 15mg TID and holding torsemide as instructed. Admits to poor PO intake of fluids/solute due to ongoing issues with poor appetite and nausea with meals which is not new. Denies any fever, chills, chest pain, SOB, cough, abd pain, dysuria nor urinary frequency. Has chronic diarrhea that is unchanged from his baseline.     In the ED, vitals notable for SBP 92-11s. Labs notable for elevated BUN/Cr 31/1.59 (from 30/1.18 on day of discharge 6/29/24). CXR with clear lungs. Abd US with innumerable intrahepatic echogenic masses consistent with known metastatic neuroendocrine tumor. Admitted to medicine for symptomatic hypotension and RAZIA. (02 Jul 2024 18:34)    SUBJECTIVE / OVERNIGHT EVENTS:  Overnight, NG tube placed. Patient had a large melanotic stool. Hgb dropped to 6.6 and received 1 unit pRBC. Vaso was started for a short period of time. Patient was having some SVT runs. Patient given dig 2.5. HRs 110-120s.     Today, patient examined at bedside. He denies pain or SOB. He has no complaints, but continues to request water.    Gtt henny 2.5 (double concentrate)  Ivory in place    Vitals: HR 87, SpO2 100% RA, /58, RR 15, Temp 35.8    MEDICATIONS  (STANDING):  aMIOdarone    Tablet 400 milliGRAM(s) Oral every 8 hours  aMIOdarone    Tablet 200 milliGRAM(s) Oral daily  aMIOdarone    Tablet   Oral   atorvastatin 80 milliGRAM(s) Oral at bedtime  chlorhexidine 0.12% Liquid 15 milliLiter(s) Oral Mucosa every 12 hours  chlorhexidine 2% Cloths 1 Application(s) Topical <User Schedule>  dexMEDEtomidine Infusion 0.1 MICROgram(s)/kG/Hr (2.47 mL/Hr) IV Continuous <Continuous>  doxazosin 2 milliGRAM(s) Oral at bedtime  folic acid Injectable 1 milliGRAM(s) IV Push daily  insulin lispro (ADMELOG) corrective regimen sliding scale   SubCutaneous every 6 hours  meropenem  IVPB 1000 milliGRAM(s) IV Intermittent every 8 hours  meropenem  IVPB      midodrine 30 milliGRAM(s) Oral every 8 hours  mupirocin 2% Nasal 1 Application(s) Both Nostrils two times a day  norepinephrine Infusion 0.05 MICROgram(s)/kG/Min (9.25 mL/Hr) IV Continuous <Continuous>  pantoprazole Infusion 8 mG/Hr (10 mL/Hr) IV Continuous <Continuous>  phenylephrine    Infusion 0.1 MICROgram(s)/kG/Min (1.85 mL/Hr) IV Continuous <Continuous>  propofol Infusion 20 MICROgram(s)/kG/Min (11.8 mL/Hr) IV Continuous <Continuous>  sodium bicarbonate 1300 milliGRAM(s) Oral three times a day  thiamine Injectable 100 milliGRAM(s) IV Push daily  vancomycin    Solution 125 milliGRAM(s) Oral every 6 hours  vasopressin Infusion 0.04 Unit(s)/Min (6 mL/Hr) IV Continuous <Continuous>    MEDICATIONS  (PRN):    Allergies    No Known Allergies    ICU Vital Signs Last 24 Hrs  T(F): 98 (21 Jul 2024 04:00), Max: 99 (20 Jul 2024 08:00)  HR: 137 (21 Jul 2024 04:30) (100 - 137)  BP: --  BP(mean): --  ABP: 112/54 (21 Jul 2024 04:30) (78/42 - 129/67)  ABP(mean): 78 (21 Jul 2024 04:30) (54 - 94)  RR: 29 (21 Jul 2024 04:30) (18 - 32)  SpO2: 100% (21 Jul 2024 04:30) (97% - 100%)    Physical Exam:   GENERAL: NAD, lying in bed, NG tube in place  HEAD:  Atraumatic, Normocephalic  EYES: EOMI, PERRLA, conjunctiva and sclera clear  CHEST/LUNG: Breath sounds bilaterally. Unlabored respirations  HEART: Less tachycardic, irregular rhythm  ABDOMEN: Bowel sounds present; Soft, Nontender, Slight distension  EXTREMITIES: Bilateral edema of upper and lower extremities, nonpainful to touch, cool extremities distally, cyanotic pointer finger right hand, cyanotic distal L toes  NERVOUS SYSTEM: Speech clear  SKIN: Fullness in right scapular region, no discoloration visible    I&O's Summary    19 Jul 2024 07:01  -  20 Jul 2024 07:00  --------------------------------------------------------  IN: 2987.1 mL / OUT: 1985 mL / NET: 1002.1 mL    20 Jul 2024 07:01  -  21 Jul 2024 04:54  --------------------------------------------------------  IN: 2815.1 mL / OUT: 992 mL / NET: 1823.1 mL        LABS:                        8.1    14.13 )-----------( 103      ( 21 Jul 2024 00:10 )             24.9     07-21    147<H>  |  120<H>  |  52<H>  ----------------------------<  163<H>  3.6   |  14<L>  |  1.40<H>    Ca    7.8<L>      21 Jul 2024 00:10  Phos  4.2     07-21  Mg     1.7     07-21    TPro  4.2<L>  /  Alb  2.1<L>  /  TBili  0.8  /  DBili  x   /  AST  53<H>  /  ALT  38  /  AlkPhos  184<H>  07-21    PT/INR - ( 21 Jul 2024 00:10 )   PT: 11.5 sec;   INR: 1.10 ratio         PTT - ( 21 Jul 2024 00:10 )  PTT:27.1 sec  ABG - ( 21 Jul 2024 00:00 )  pH, Arterial: 7.33  pH, Blood: x     /  pCO2: 30    /  pO2: 136   / HCO3: 16    / Base Excess: -9.2  /  SaO2: 100.0     Urinalysis Basic - ( 21 Jul 2024 00:10 )    Color: x / Appearance: x / SG: x / pH: x  Gluc: 163 mg/dL / Ketone: x  / Bili: x / Urobili: x   Blood: x / Protein: x / Nitrite: x   Leuk Esterase: x / RBC: x / WBC x   Sq Epi: x / Non Sq Epi: x / Bacteria: x      CARDIAC MARKERS ( 21 Jul 2024 00:10 )  x     / x     / 228 U/L / x     / x      CARDIAC MARKERS ( 20 Jul 2024 00:47 )  x     / x     / 137 U/L / x     / x          CAPILLARY BLOOD GLUCOSE      POCT Blood Glucose.: 167 mg/dL (20 Jul 2024 17:08)  POCT Blood Glucose.: 175 mg/dL (20 Jul 2024 12:17)  POCT Blood Glucose.: 107 mg/dL (20 Jul 2024 05:38)      RADIOLOGY & ADDITIONAL TESTS:  Results Reviewed:   Imaging Personally Reviewed:  Electrocardiogram Personally Reviewed: Jodie Okeefe MD PGY-1  ---------------------------------------------------------------------------------------------  Patient is a 78y old  Male who presents with a chief complaint of hypotension (20 Jul 2024 22:17)    HPI:  77 yo male with PMH of CAD s/p PCI x3 with most recent stent 6/12/24 on Plavix, chronic Afib on eliquis, orthostatic hypotension on midodrine, PAD, 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 Patient states since discharge on this past Saturday, he continued to feel ongoing generalized weakness and dizziness with positional changes despite compliance with home midodrine 15mg TID and holding torsemide as instructed. Admits to poor PO intake of fluids/solute due to ongoing issues with poor appetite and nausea with meals which is not new. Denies any fever, chills, chest pain, SOB, cough, abd pain, dysuria nor urinary frequency. Has chronic diarrhea that is unchanged from his baseline.     In the ED, vitals notable for SBP 92-11s. Labs notable for elevated BUN/Cr 31/1.59 (from 30/1.18 on day of discharge 6/29/24). CXR with clear lungs. Abd US with innumerable intrahepatic echogenic masses consistent with known metastatic neuroendocrine tumor. Admitted to medicine for symptomatic hypotension and RAZIA. (02 Jul 2024 18:34)    SUBJECTIVE / OVERNIGHT EVENTS:  No overnight events, dig level 1.3. Patient did not require any transfusions.    Today, patient examined at bedside. He denies pain or SOB. He has no complaints.    Gtt vaso 0.04, henny 0.6 (double concentrate)  IVF  Ivory in place    Vitals: HR 66, SpO2 100% RA, /42, RR 15, temp 36.6    MEDICATIONS  (STANDING):  aMIOdarone    Tablet 400 milliGRAM(s) Oral every 8 hours  aMIOdarone    Tablet 200 milliGRAM(s) Oral daily  aMIOdarone    Tablet   Oral   atorvastatin 80 milliGRAM(s) Oral at bedtime  chlorhexidine 0.12% Liquid 15 milliLiter(s) Oral Mucosa every 12 hours  chlorhexidine 2% Cloths 1 Application(s) Topical <User Schedule>  dexMEDEtomidine Infusion 0.1 MICROgram(s)/kG/Hr (2.47 mL/Hr) IV Continuous <Continuous>  doxazosin 2 milliGRAM(s) Oral at bedtime  folic acid Injectable 1 milliGRAM(s) IV Push daily  insulin lispro (ADMELOG) corrective regimen sliding scale   SubCutaneous every 6 hours  meropenem  IVPB 1000 milliGRAM(s) IV Intermittent every 8 hours  meropenem  IVPB      midodrine 30 milliGRAM(s) Oral every 8 hours  mupirocin 2% Nasal 1 Application(s) Both Nostrils two times a day  norepinephrine Infusion 0.05 MICROgram(s)/kG/Min (9.25 mL/Hr) IV Continuous <Continuous>  pantoprazole Infusion 8 mG/Hr (10 mL/Hr) IV Continuous <Continuous>  phenylephrine    Infusion 0.1 MICROgram(s)/kG/Min (1.85 mL/Hr) IV Continuous <Continuous>  propofol Infusion 20 MICROgram(s)/kG/Min (11.8 mL/Hr) IV Continuous <Continuous>  sodium bicarbonate 1300 milliGRAM(s) Oral three times a day  thiamine Injectable 100 milliGRAM(s) IV Push daily  vancomycin    Solution 125 milliGRAM(s) Oral every 6 hours  vasopressin Infusion 0.04 Unit(s)/Min (6 mL/Hr) IV Continuous <Continuous>    MEDICATIONS  (PRN):    Allergies    No Known Allergies    ICU Vital Signs Last 24 Hrs  T(F): 98 (21 Jul 2024 04:00), Max: 99 (20 Jul 2024 08:00)  HR: 137 (21 Jul 2024 04:30) (100 - 137)  BP: --  BP(mean): --  ABP: 112/54 (21 Jul 2024 04:30) (78/42 - 129/67)  ABP(mean): 78 (21 Jul 2024 04:30) (54 - 94)  RR: 29 (21 Jul 2024 04:30) (18 - 32)  SpO2: 100% (21 Jul 2024 04:30) (97% - 100%)    Physical Exam:   GENERAL: NAD, lying in bed, NG tube in place, sleepy but arousable  HEAD:  Atraumatic, Normocephalic  EYES: EOMI, PERRLA, conjunctiva and sclera clear  CHEST/LUNG: Breath sounds bilaterally. Unlabored respirations, slight wheezes L sided  HEART: Iregular rhythm, no longer tachycardic  ABDOMEN: Bowel sounds present; Soft, Nontender, Slight distension  EXTREMITIES: Bilateral edema of upper and lower extremities, nonpainful to touch, cool extremities distally, cyanotic pointer finger right hand, cyanotic distal L toes, similar to yesterday, slightly more cyanotic right middle finger  NERVOUS SYSTEM: Speech clear, no focal deficits    I&O's Summary    19 Jul 2024 07:01  -  20 Jul 2024 07:00  --------------------------------------------------------  IN: 2987.1 mL / OUT: 1985 mL / NET: 1002.1 mL    20 Jul 2024 07:01  -  21 Jul 2024 04:54  --------------------------------------------------------  IN: 2815.1 mL / OUT: 992 mL / NET: 1823.1 mL        LABS:                        8.1    14.13 )-----------( 103      ( 21 Jul 2024 00:10 )             24.9     07-21    147<H>  |  120<H>  |  52<H>  ----------------------------<  163<H>  3.6   |  14<L>  |  1.40<H>    Ca    7.8<L>      21 Jul 2024 00:10  Phos  4.2     07-21  Mg     1.7     07-21    TPro  4.2<L>  /  Alb  2.1<L>  /  TBili  0.8  /  DBili  x   /  AST  53<H>  /  ALT  38  /  AlkPhos  184<H>  07-21    PT/INR - ( 21 Jul 2024 00:10 )   PT: 11.5 sec;   INR: 1.10 ratio         PTT - ( 21 Jul 2024 00:10 )  PTT:27.1 sec  ABG - ( 21 Jul 2024 00:00 )  pH, Arterial: 7.33  pH, Blood: x     /  pCO2: 30    /  pO2: 136   / HCO3: 16    / Base Excess: -9.2  /  SaO2: 100.0     Urinalysis Basic - ( 21 Jul 2024 00:10 )    Color: x / Appearance: x / SG: x / pH: x  Gluc: 163 mg/dL / Ketone: x  / Bili: x / Urobili: x   Blood: x / Protein: x / Nitrite: x   Leuk Esterase: x / RBC: x / WBC x   Sq Epi: x / Non Sq Epi: x / Bacteria: x      CARDIAC MARKERS ( 21 Jul 2024 00:10 )  x     / x     / 228 U/L / x     / x      CARDIAC MARKERS ( 20 Jul 2024 00:47 )  x     / x     / 137 U/L / x     / x          CAPILLARY BLOOD GLUCOSE      POCT Blood Glucose.: 167 mg/dL (20 Jul 2024 17:08)  POCT Blood Glucose.: 175 mg/dL (20 Jul 2024 12:17)  POCT Blood Glucose.: 107 mg/dL (20 Jul 2024 05:38)      RADIOLOGY & ADDITIONAL TESTS:  Results Reviewed:   Imaging Personally Reviewed:  Electrocardiogram Personally Reviewed: Jodie Okeefe MD PGY-1  ---------------------------------------------------------------------------------------------  Patient is a 78y old  Male who presents with a chief complaint of hypotension (20 Jul 2024 22:17)    HPI:  79 yo male with PMH of CAD s/p PCI x3 with most recent stent 6/12/24 on Plavix, chronic Afib on eliquis, orthostatic hypotension on midodrine, PAD, 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 Patient states since discharge on this past Saturday, he continued to feel ongoing generalized weakness and dizziness with positional changes despite compliance with home midodrine 15mg TID and holding torsemide as instructed. Admits to poor PO intake of fluids/solute due to ongoing issues with poor appetite and nausea with meals which is not new. Denies any fever, chills, chest pain, SOB, cough, abd pain, dysuria nor urinary frequency. Has chronic diarrhea that is unchanged from his baseline.     In the ED, vitals notable for SBP 92-11s. Labs notable for elevated BUN/Cr 31/1.59 (from 30/1.18 on day of discharge 6/29/24). CXR with clear lungs. Abd US with innumerable intrahepatic echogenic masses consistent with known metastatic neuroendocrine tumor. Admitted to medicine for symptomatic hypotension and RAZIA. (02 Jul 2024 18:34)    SUBJECTIVE / OVERNIGHT EVENTS:  No overnight events, dig level 1.3. Patient did not require any transfusions.    Today, patient examined at bedside. He denies pain or SOB. He has no complaints.    Gtt vaso 0.04, henny 0.6 (double concentrate)  IVF  Ivory in place    Vitals: HR 66, SpO2 100% RA, /42, RR 15, temp 36.6    MEDICATIONS  (STANDING):  aMIOdarone    Tablet 400 milliGRAM(s) Oral every 8 hours  aMIOdarone    Tablet 200 milliGRAM(s) Oral daily  aMIOdarone    Tablet   Oral   atorvastatin 80 milliGRAM(s) Oral at bedtime  chlorhexidine 0.12% Liquid 15 milliLiter(s) Oral Mucosa every 12 hours  chlorhexidine 2% Cloths 1 Application(s) Topical <User Schedule>  dexMEDEtomidine Infusion 0.1 MICROgram(s)/kG/Hr (2.47 mL/Hr) IV Continuous <Continuous>  doxazosin 2 milliGRAM(s) Oral at bedtime  folic acid Injectable 1 milliGRAM(s) IV Push daily  insulin lispro (ADMELOG) corrective regimen sliding scale   SubCutaneous every 6 hours  meropenem  IVPB 1000 milliGRAM(s) IV Intermittent every 8 hours  meropenem  IVPB      midodrine 30 milliGRAM(s) Oral every 8 hours  mupirocin 2% Nasal 1 Application(s) Both Nostrils two times a day  norepinephrine Infusion 0.05 MICROgram(s)/kG/Min (9.25 mL/Hr) IV Continuous <Continuous>  pantoprazole Infusion 8 mG/Hr (10 mL/Hr) IV Continuous <Continuous>  phenylephrine    Infusion 0.1 MICROgram(s)/kG/Min (1.85 mL/Hr) IV Continuous <Continuous>  propofol Infusion 20 MICROgram(s)/kG/Min (11.8 mL/Hr) IV Continuous <Continuous>  sodium bicarbonate 1300 milliGRAM(s) Oral three times a day  thiamine Injectable 100 milliGRAM(s) IV Push daily  vancomycin    Solution 125 milliGRAM(s) Oral every 6 hours  vasopressin Infusion 0.04 Unit(s)/Min (6 mL/Hr) IV Continuous <Continuous>    No Known Allergies    ICU Vital Signs Last 24 Hrs  T(F): 98 (21 Jul 2024 04:00), Max: 99 (20 Jul 2024 08:00)  HR: 137 (21 Jul 2024 04:30) (100 - 137)  ABP: 112/54 (21 Jul 2024 04:30) (78/42 - 129/67)  ABP(mean): 78 (21 Jul 2024 04:30) (54 - 94)  RR: 29 (21 Jul 2024 04:30) (18 - 32)  SpO2: 100% (21 Jul 2024 04:30) (97% - 100%)    Physical Exam:   GENERAL: NAD, lying in bed, NG tube in place, sleepy but arousable  HEAD:  Atraumatic, Normocephalic  EYES: EOMI, PERRLA, conjunctiva and sclera clear  CHEST/LUNG: Breath sounds bilaterally. Unlabored respirations, slight wheezes L sided  HEART: Iregular rhythm, no longer tachycardic  ABDOMEN: Bowel sounds present; Soft, Nontender, Slight distension  EXTREMITIES: Bilateral edema of upper and lower extremities, nonpainful to touch, cool extremities distally, cyanotic pointer finger right hand, cyanotic distal L toes, similar to yesterday, slightly more cyanotic right middle finger  NERVOUS SYSTEM: Speech clear, no focal deficits    I&O's Summary    19 Jul 2024 07:01  -  20 Jul 2024 07:00  --------------------------------------------------------  IN: 2987.1 mL / OUT: 1985 mL / NET: 1002.1 mL    20 Jul 2024 07:01  -  21 Jul 2024 04:54  --------------------------------------------------------  IN: 2815.1 mL / OUT: 992 mL / NET: 1823.1 mL      LABS:                        8.1    14.13 )-----------( 103      ( 21 Jul 2024 00:10 )             24.9     07-21    147<H>  |  120<H>  |  52<H>  ----------------------------<  163<H>  3.6   |  14<L>  |  1.40<H>    Ca    7.8<L>      21 Jul 2024 00:10  Phos  4.2     07-21  Mg     1.7     07-21    TPro  4.2<L>  /  Alb  2.1<L>  /  TBili  0.8  /  DBili  x   /  AST  53<H>  /  ALT  38  /  AlkPhos  184<H>  07-21    PT/INR - ( 21 Jul 2024 00:10 )   PT: 11.5 sec;   INR: 1.10 ratio         PTT - ( 21 Jul 2024 00:10 )  PTT:27.1 sec  ABG - ( 21 Jul 2024 00:00 )  pH, Arterial: 7.33  pH, Blood: x     /  pCO2: 30    /  pO2: 136   / HCO3: 16    / Base Excess: -9.2  /  SaO2: 100.0     Urinalysis Basic - ( 21 Jul 2024 00:10 )    Color: x / Appearance: x / SG: x / pH: x  Gluc: 163 mg/dL / Ketone: x  / Bili: x / Urobili: x   Blood: x / Protein: x / Nitrite: x   Leuk Esterase: x / RBC: x / WBC x   Sq Epi: x / Non Sq Epi: x / Bacteria: x      CARDIAC MARKERS ( 21 Jul 2024 00:10 )  x     / x     / 228 U/L / x     / x      CARDIAC MARKERS ( 20 Jul 2024 00:47 )  x     / x     / 137 U/L / x     / x          CAPILLARY BLOOD GLUCOSE      POCT Blood Glucose.: 167 mg/dL (20 Jul 2024 17:08)  POCT Blood Glucose.: 175 mg/dL (20 Jul 2024 12:17)  POCT Blood Glucose.: 107 mg/dL (20 Jul 2024 05:38)

## 2024-07-24 NOTE — CONSULT NOTE ADULT - SUBJECTIVE AND OBJECTIVE BOX
[facility]  [address] [place] [address]  Dept: [phone]  Dept Fax: [phone]  Loc: [phone]    Alka Kenney is a 19 y.o. female who presents today for her medical conditions/complaints as noted below.  Alka Kenney is c/o of Cramps in both sides of upper back      Chief Complaint   Patient presents with    Cramps in both sides of upper back       HPI:     HPI  Patient presents today with complaints of \"cramps\" on both sides of back that radiates to her arms.  This has been ongoing for some time, but is more frequent.  She reports that it is like a charley horse and does not having any SIB with it. She also states that she has had a \"static\" feeling in her calves after being on her feet for a long time.      Past Medical History:   Diagnosis Date    Allergic rhinitis     Allergy to alpha-gal     Asthma     dx 2 yrs agoDr Phillip Owens        Past Surgical History:   Procedure Laterality Date    ADENOIDECTOMY      TONSILLECTOMY         Social History     Tobacco Use    Smoking status: Never    Smokeless tobacco: Never   Substance Use Topics    Alcohol use: No        Current Outpatient Medications   Medication Sig Dispense Refill    norgestimate-ethinyl estradiol (ESTARYLLA) 0.25-35 MG-MCG per tablet TAKE 1 TABLET BY MOUTH DAILY 28 tablet 5    amitriptyline (ELAVIL) 10 MG tablet Take 1.5 tablets by mouth nightly 45 tablet 2    metoprolol succinate (TOPROL XL) 25 MG extended release tablet TAKE 1 TABLET BY MOUTH DAILY 30 tablet 3    vitamin D (ERGOCALCIFEROL) 1.25 MG (19823 UT) CAPS capsule TAKE 1 CAPSULE BY MOUTH 1 TIME A WEEK 4 capsule 3    EPINEPHrine (EPIPEN 2-NIKIA) 0.3 MG/0.3ML SOAJ injection Inject 0.3 mLs into the skin once for 1 dose Use as directed for allergic reaction 0.3 mL 3     No current facility-administered medications for this visit.       Allergies   Allergen Reactions    Cefdinir Diarrhea     GI Problems    Beef-Derived  [facility]  [phone]  NEPHROLOGY      INITIAL CONSULT NOTE  --------------------------------------------------------------------------------  HPI:      79 yo male with PMH of CAD s/p PCI x3 with most recent stent 6/12/24 on Plavix, chronic Afib on eliquis, orthostatic hypotension on midodrine, PAD, 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 Patient states since discharge on this past Saturday, he continued to feel ongoing generalized weakness and dizziness with positional changes despite compliance with home midodrine 15mg TID and holding torsemide as instructed as of day PTA . Admits to poor PO intake of fluids/solute due to ongoing issues with poor appetite and nausea with meals which is not new. In the ED, vitals notable for SBP 92-11s. Labs notable for elevated BUN/Cr 31/1.59 (from 30/1.18 on day of discharge 6/29/24). CXR with clear lungs. Abd US with innumerable intrahepatic echogenic masses consistent with known metastatic neuroendocrine tumor. Admitted to medicine for symptomatic hypotension and RAZIA. renal consult called.       PAST HISTORY  --------------------------------------------------------------------------------  PAST MEDICAL & SURGICAL HISTORY:  Hypertension      Hypercholesterolemia      PAD (peripheral artery disease)      Neuroendocrine carcinoma      Hepatic carcinoma      Atrial fibrillation and flutter      CAD (coronary artery disease)      S/P knee replacement, bilateral      S/P hip replacement  right hip      History of hernia repair      H/O laminectomy      History of lumbosacral spine surgery      History of cardioversion        FAMILY HISTORY:    PAST SOCIAL HISTORY:    ALLERGIES & MEDICATIONS  --------------------------------------------------------------------------------  Allergies    No Known Allergies    Intolerances      Standing Inpatient Medications  allopurinol 100 milliGRAM(s) Oral two times a day  apixaban 5 milliGRAM(s) Oral every 12 hours  ascorbic acid 500 milliGRAM(s) Oral daily  atorvastatin 80 milliGRAM(s) Oral at bedtime  clopidogrel Tablet 75 milliGRAM(s) Oral daily  colchicine 0.6 milliGRAM(s) Oral daily  folic acid 1 milliGRAM(s) Oral daily  lactated ringers. 1000 milliLiter(s) IV Continuous <Continuous>  midodrine. 15 milliGRAM(s) Oral three times a day  multivitamin 1 Tablet(s) Oral daily  sotalol. 40 milliGRAM(s) Oral every 24 hours    PRN Inpatient Medications  acetaminophen     Tablet .. 650 milliGRAM(s) Oral every 6 hours PRN  ondansetron Injectable 4 milliGRAM(s) IV Push every 8 hours PRN      REVIEW OF SYSTEMS  --------------------------------------------------------------------------------  Gen: No  fevers/chills   Skin: No rashes  Head/Eyes/Ears/Mouth: No headache; Normal hearing;  No sinus pain/discomfort, sore throat  Respiratory: No dyspnea, cough, wheezing, hemoptysis  CV: No chest pain, orthopnea  GI: No abdominal pain, diarrhea, nausea, vomiting, melena  : No dysuria, decrease urination or hesitancy urinating  hematuria, nocturia  MSK:  +  back pain  Neuro:  +  dizziness/lightheadedness  also with no edema     denies diarrhea     VITALS/PHYSICAL EXAM  --------------------------------------------------------------------------------  T(C): 36.6 (07-03-24 @ 19:34), Max: 37 (07-02-24 @ 23:37)  HR: 73 (07-03-24 @ 19:34) (62 - 85)  BP: 104/60 (07-03-24 @ 21:13) (96/60 - 110/68)  RR: 18 (07-03-24 @ 19:34) (18 - 18)  SpO2: 98% (07-03-24 @ 19:34) (97% - 99%)  Wt(kg): --  Height (cm): 188 (07-02-24 @ 13:27)  Weight (kg): 98.7 (07-03-24 @ 17:43)  BMI (kg/m2): 27.9 (07-03-24 @ 17:43)  BSA (m2): 2.25 (07-03-24 @ 17:43)      07-03-24 @ 07:01  -  07-03-24 @ 23:23  --------------------------------------------------------  IN: 740 mL / OUT: 400 mL / NET: 340 mL      Physical Exam:  	Gen: Non toxic comfortable appearing   	no jvd  	Pulm: decrease bs  no rales or ronchi or wheezing  	CV: RRR, S1S2; no rub  	Abd: +BS, soft, nontender/nondistended  	: No suprapubic tenderness  	UE: Warm, no cyanosis  no clubbing,  no edema; no asterixis  	LE: Warm, no cyanosis  no clubbing, no edema  	Neuro: alert and oriented. speech coherent   	Skin: Warm, no decrease skin turgor       LABS/STUDIES  --------------------------------------------------------------------------------              10.0   9.74  >-----------<  272      [07-03-24 @ 06:51]              30.0     139  |  107  |  26  ----------------------------<  110      [07-03-24 @ 06:51]  4.5   |  19  |  1.38        Ca     9.4     [07-03-24 @ 06:51]      Mg     1.8     [07-03-24 @ 06:51]      Phos  3.0     [07-03-24 @ 06:51]    TPro  7.0  /  Alb  3.1  /  TBili  0.5  /  DBili  x   /  AST  117  /  ALT  81  /  AlkPhos  307  [07-02-24 @ 14:12]          Creatinine Trend:  SCr 1.38 [07-03 @ 06:51]  SCr 1.59 [07-02 @ 14:12]  SCr 1.18 [06-29 @ 07:03]  SCr 1.31 [06-28 @ 10:47]  SCr 1.38 [06-27 @ 14:21]      Urine Creatinine 108      [07-02-24 @ 20:10]  Urine Sodium 17      [07-02-24 @ 20:10]  Urine Urea Nitrogen 730      [07-02-24 @ 20:10]    TSH 3.74      [10-31-23 @ 07:06]    HCV 0.11, Nonreact      [10-09-21 @ 09:59]    Urinalysis + Microscopic Examination (07.02.24 @ 20:10)   pH Urine: 5.5  Urine Appearance: Cloudy  Color: Yellow  Specific Gravity: 1.028  Protein, Urine: 30 mg/dL  Glucose Qualitative, Urine: >=1000 mg/dL  Ketone - Urine: Negative mg/dL  Blood, Urine: Negative  Bilirubin: Negative  Urobilinogen: 1.0 mg/dL  Leukocyte Esterase Concentration: Negative  Nitrite: Negative  Review: Reviewed  White Blood Cell - Urine: 2 /HPF  Red Blood Cell - Urine: 0 /HPF  Bacteria: Negative /HPF  Cast: 12 /LPF  Hyaline Casts: Present  Coarse Granular Casts: Present  Epithelial Cells: 7 /HPF

## 2024-07-24 NOTE — PROGRESS NOTE ADULT - SUBJECTIVE AND OBJECTIVE BOX
Patient is a 78y old  Male who presents with a chief complaint of hypotension (24 Jul 2024 05:58)    Patient seen and examined at bedside, NG tube in place. Reports melena overnight.  MEDICATIONS  (STANDING):  aMIOdarone    Tablet   Oral   aMIOdarone    Tablet 200 milliGRAM(s) Oral daily  atorvastatin 80 milliGRAM(s) Oral at bedtime  chlorhexidine 2% Cloths 1 Application(s) Topical <User Schedule>  chlorhexidine 4% Liquid 1 Application(s) Topical <User Schedule>  dexMEDEtomidine Infusion 0.5 MICROgram(s)/kG/Hr (15.7 mL/Hr) IV Continuous <Continuous>  doxazosin 2 milliGRAM(s) Oral at bedtime  folic acid Injectable 1 milliGRAM(s) IV Push daily  hydrocortisone sodium succinate Injectable 50 milliGRAM(s) IV Push every 6 hours  insulin lispro (ADMELOG) corrective regimen sliding scale   SubCutaneous every 6 hours  meropenem  IVPB 1000 milliGRAM(s) IV Intermittent every 8 hours  meropenem  IVPB      midodrine 30 milliGRAM(s) Oral every 8 hours  pantoprazole  Injectable 40 milliGRAM(s) IV Push every 12 hours  phenylephrine    Infusion 0.1 MICROgram(s)/kG/Min (1.85 mL/Hr) IV Continuous <Continuous>  potassium chloride  20 mEq/100 mL IVPB 20 milliEquivalent(s) IV Intermittent every 1 hour  sodium bicarbonate 1950 milliGRAM(s) Oral three times a day  thiamine Injectable 100 milliGRAM(s) IV Push daily  vasopressin Infusion 0.04 Unit(s)/Min (6 mL/Hr) IV Continuous <Continuous>    MEDICATIONS  (PRN):  sodium chloride 0.9% lock flush 10 milliLiter(s) IV Push every 1 hour PRN Pre/post blood products, medications, blood draw, and to maintain line patency    Vital Signs Last 24 Hrs  T(C): 36.7 (24 Jul 2024 08:00), Max: 36.9 (24 Jul 2024 00:00)  T(F): 98.1 (24 Jul 2024 08:00), Max: 98.4 (24 Jul 2024 00:00)  HR: 75 (24 Jul 2024 11:15) (60 - 89)  BP: --  BP(mean): --  RR: 26 (24 Jul 2024 11:15) (12 - 34)  SpO2: 100% (24 Jul 2024 11:15) (92% - 100%)    Parameters below as of 24 Jul 2024 08:00  Patient On (Oxygen Delivery Method): room air    PE  NAD  Awake, alert  Anicteric, MMM  NG tube  No c/c/e  No rash grossly                          8.0    10.08 )-----------( 103      ( 24 Jul 2024 05:10 )             23.8     07-23    150<H>  |  120<H>  |  51<H>  ----------------------------<  200<H>  3.4<L>   |  17<L>  |  1.15    Ca    7.5<L>      23 Jul 2024 23:53  Phos  3.5     07-23  Mg     2.0     07-23    TPro  4.2<L>  /  Alb  2.3<L>  /  TBili  0.5  /  DBili  x   /  AST  77<H>  /  ALT  31  /  AlkPhos  175<H>  07-23

## 2024-07-24 NOTE — PROGRESS NOTE ADULT - ATTENDING COMMENTS
78M w/ CAD (recent stent 6/12/24), Afib, metastatic neuroendocrine pancreatic tumor. Admitted w/ hypotension, course complicated by ABLA and GIB. Undergone EGD #1 on 7/9 which showed uncontrolled bleeding, after which pt developed Afib w/ RVR and hemorrhagic shock, s/p IR for GDA embolization. EGD #2 on 7/12 showed oozing but no active bleeding. Course further complicated by Cdiff colitis. Had another episode of melena and ABLA on 7/18 s/p EGD #3 showed bleeding vessel s/p clipx3 and epi. Pt remains intubated post-procedure and has since been extubated. Continues to have intermittent melanotic stools, and in the last 36 hours has required 2 pRBCs, 1 plt and 1 FFP for continued melena and ABLA. Remains in shock state as well.     Awake and alert. SHock state improving, etiology hemorrhagic shock, phenylephrine titrated off, remains on vasopressin, continue midodrine, stress dose steroids and start droxiopa, titrate to MAP >/65-70; afib w/ RVR resolved s/p IV digoxin load, start PO digoxin, continue amiodarone; attempted asa challenge but then bled again, keep off DAPT, known recent NURIA in 6/2024. Satting well on RA. S/P 10 days of vancomycin PO for C diff; continue meropenam for Klebsiella pneumonia, plan for 7 day course; otherwise all cx NGTD. RAZIA likely prerenal ATN, stable, non-oliguric; diuresis today as pt is grossly volume overloaded but needs aggressive repletion of electrolytes; continue free water for hyperNa, decrease PO bicarb as this may be contributing to hyperNa; tavarez placed by urology - no plan to remove for now. NGT in place as pt has failed several swallow evaluations prior, start trickle feeds today; surgery holding off any intervention for now in setting of recent asa, f/u if continues to bleed several days after asa; continue PPI q12; s/p EGD x3 and GDA embolization for multiple GIB. ABLA due to GIB, received additional 2 pRBC, 1 plt, 1 FFP yesterday w/ appropriate response; count is 14:4:3; CBC q8 today. FS w/ ISS coverage. Prognosis guarded. Full code 78M w/ CAD (recent stent 6/12/24), Afib, metastatic neuroendocrine pancreatic tumor. Admitted w/ hypotension, course complicated by ABLA and GIB. Undergone EGD #1 on 7/9 which showed uncontrolled bleeding, after which pt developed Afib w/ RVR and hemorrhagic shock, s/p IR for GDA embolization. EGD #2 on 7/12 showed oozing but no active bleeding. Course further complicated by Cdiff colitis. Had another episode of melena and ABLA on 7/18 s/p EGD #3 showed bleeding vessel s/p clipx3 and epi. Pt remains intubated post-procedure and has since been extubated. Continues to have intermittent melanotic stools, and in the last 36 hours has required 2 pRBCs, 1 plt and 1 FFP for continued melena and ABLA. Remains in shock state as well.     Awake and alert. SHock state improving, etiology hemorrhagic shock, phenylephrine titrated off, remains on vasopressin, continue midodrine, stress dose steroids and start droxiopa, titrate to MAP >/65-70; afib w/ RVR resolved s/p IV digoxin load, start PO digoxin, continue amiodarone; attempted asa challenge but then bled again, keep off DAPT, known recent NURIA in 6/2024. Satting well on RA. S/P 10 days of vancomycin PO for C diff; continue meropenam for Klebsiella pneumonia, plan for 7 day course; otherwise all cx NGTD. RAZIA likely prerenal ATN, stable, non-oliguric; diuresis today as pt is grossly volume overloaded but needs aggressive repletion of electrolytes; continue free water for hyperNa, decrease PO bicarb as this may be contributing to hyperNa; tavarez placed by urology - no plan to remove for now. NGT in place as pt has failed several swallow evaluations prior, start trickle feeds today; surgery holding off any intervention for now in setting of recent asa, f/u if continues to bleed several days after asa; continue PPI q12; s/p EGD x3 and GDA embolization for multiple GIB; do not believe octreotide will be helpful here as pt is not having diarrhea. ABLA due to GIB, received additional 2 pRBC, 1 plt, 1 FFP yesterday w/ appropriate response; count is 14:4:3; CBC q8 today. FS w/ ISS coverage. Prognosis guarded. Full code

## 2024-07-25 NOTE — PROGRESS NOTE ADULT - NS ATTEND AMEND GEN_ALL_CORE FT
Agree with above assessment and plan.   Discussed with [facility] [place] Prasad- plan to start octreotide 250mcg q8hr. Rest of plan as above

## 2024-07-25 NOTE — ASU PATIENT PROFILE, ADULT - PRO TOBACCO TYPE
Coordinator calls patient to communicate that there are no needed changes at this time. Patient to repeat labs 8/12/24. Voicemail left for patient, no answer.   cigarettes/cigars

## 2024-07-25 NOTE — PROGRESS NOTE ADULT - ASSESSMENT
Assessment	  Assessment: 79 yo male with metastatic neuroendocrine pancreatic cancer (tx on hold) and PMH of CAD s/p PCI x3 with most recent stent 6/12/24 on Plavix and eliquis , chronic Afib on eliquis, orthostatic hypotension on midodrine, PAD , recent admission for dx cath on 6/24/24 presented from PCP's office for hypotension despite taking AM midodrine dose on 7/2, admitted to medicine for symptomatic hypotension and RAZIA. Per chart, on 7/8 PM, pt was noted to have acute onset of melena x5 and coffee ground emesis x2-3. RRT was called on 7/9 AM for hypotension, hgb dropped from 9.2 to 7.4 (admission baseline 10-11), FOBT +, pt was started on PPI IV and transfused 1 unit of pRBC. GI was consulted and underwent EGD on 7/9 afternoon. MICU was consulted for uncontrolled bleeding during EGD. During EGD, pt became hypotensive and was given phenylephrine, had A-fib with RVR s/p amiodarone. Now s/p IR GDA embolization, admitted to MICU for further moniring i.s.o hemorrhagic shock 2/2 GI bleed. On repeat EGD, oozing but no active bleeding was noted. Hospital course c/b c.diff, and patient has been placed on vancomycin. Patient had melanotic stool 7/18 with Hgb drop 10.1 to 7.1 and received 2 units pRBC and 1 unit platelets. Patient was reintubated and underwent EGD 7/18 afternoon. GI found duodenal bleed. Patient now s/p 3x clips and epi. Since 7/18 patient has continued to have melanotic stools with hemoglobin drops concerning for slow rebleeding.     Plan:     NEUROLOGIC  # Baseline AOx3  Course:  - intubated 7/9, extubated 7/15  - reintubated 7/18 for EGD, extubated 7/22  - not sedated    CARDIOVASCULAR  # hemorrhagic shock  Course:  - Per spouse, pt's baseline BP is a little bit over 100  - 7/9: RRT called 7/9 for hypotension; 2/2 to Upper GI bleeds, urgently took to EGD, found bleeding ulcer that was not well controlled, underwent IR GDA embolization  - 7/18, patient had large melanotic stool with Hgb drop from 10.1-7.1, patient was restarted on henny, vaso, and levo for reintubation for GI scope; s/p 2pRBC, 1 platelet  - Total transfusions: 12pRBC, 4 platelet, 3 FFP  Findings:  - AM cortisol 17 (7/5), AM cortisol 37.1 (7/11)  - POCUS ECHO (7/16) showed no cardiogenic shock, no tamponade, low SV indeterminate IVC, irregular B lines but not concerning for pulmonary edema, and some subdiaphragmatic fluid  Plan:  - Continue to monitor CBC  - Transfuse for Hgb <8, (goal given recent stent)  - Increase droxidopa to 200 mg q8; continue to try to wean off pressors (MAP>65) as patient tolerated 20 minutes off vaso yesterday  - Continue to hold Plavix 75 mg  - Continue midodrine 30mg q8  - Continue hydrocortisone 50mg q6 for refractory shock    #CAD s/p PCI x3, most recent stent 6/12/24, NURIA to pLAD (stable)  Course:  - 7/13 restarted plavix for new stent (6/12/24) per GI, 7/18 held plavix due to bleeding  - 7/22 started low-dose aspirin as safer alternative to plavix per cardiology, but d/c 7/23 as patient bled again  Plan:  - Continue to hold Plavix 75 mg and aspirin 81 mg  - Continue atorvastatin 80 mg daily    #PAD  # A-fib on eliquis (improving)  May have been worsened because of GI bleed  - s/p successful DCCV 10/31   - 7/16 In RVR, 2 doses of amio overnight; Cards recommended resuming home dose sotalol 40 mg PO (qTC wnl), s/p 1 dose sotalol but RVR persisted  - 7/16 Started amio loading  - 7/22 Added digoxin load for persistent HR 130s over past week  - 7/23 Dig level 1.3, patient's HR back in 60s in the afternoon  Plan:  - Hold eliquis   - Continue amio 200 mg daily  - Continue digoxin maintenance dose 125 mcg daily  - Hold home sotalol  - Cardiology following, advises against chemical cardioversion off AC if possible    PULMONARY  #Intubated for airway protection prior to EGD/IR embolization (Resolved)  Course:  - Extubated 7/22, SpO2 100% on RA    RENAL/  #RAZIA (resolved)  - Cr peaked at 3.38 (7/11)  - Currently Cr 1.13 ( 7/24)  Plan:  - Continue to monitor Cr    #ATN i.s.o hypotension  #metabolic acidosis (improving)  Patient was in a state of metabolic acidosis around 7/16, but recent ABG pH in normal range. However, patient has been bicarb deficient throughout the admission.   Plan:  - f/u nephro recs  - Continue bicarb 650mg tid considering hypernatremia  - Per nephro, patient is not a dialysis candidate  - trend BUN/Cr   - monitor Is and Os     #Hypernatremia (worsening)  Patient having a gradually increasing sodium since transfer to the MICU. Current Na 150 (7/23)  Course:  - 7/22: s/p lasix 40mg and metolazone 5mg   - 7/23: s/p D5W 1L bolus  Plan:  - Continue free water 300mL q6  - Continue lower dose sodium bicarb 650 mg tid as it may be a contributing factor  - Lasix 40mg IV and Diuril 250mg IV again today to yvonne  - Recheck BMP this afternoon    #Urinary retention (improving)  Plan:  - Started Doxazosin 2 mg daily (7/15)  - Monitor I/Os  - Ivory placed by urology 7/19, making urine    GASTROINTESTINAL  #Upper GI Bleed (worsening)  Course:  - 7/9 CT A/P - Active bleeding in the third portion of the duodenum. Progression of liver metastases.  - 7/9 EGD showed esophagitis, One non-bleeding duodenal ulcer with a clean ulcer base (Arjun Class III). One oozing duodenal ulcer with spurting hemorrhage (Arjun Class Ia). Treatment not successful. Treated with bipolar cautery.  - 7/9 s/p IR GDA embolization  - 7/18 Patient had melanotic stool with hemoglobin drop 10.1 to 7.1; EGD showing duodenal bleed s/p 3 clips + epi  - CTA (7/20) showed no active bleed  - Total EGDs: 3  Plan:  - Continue pantoprazole 40 mg IV q12  - monitor Hgb, transfuse as needed for Hgb <8  - continue to f/u with GI and IR     - Per GI, "Will need PPI BID for 8 weeks for grade D esophagitis and PUD"    - Per GI, high risk of 30 day rebleed (>10-20% risk)  - No acute IR intervention per IR because no active area of extravasation on imaging (7/21)  - No acute GI intervention per GI (7/21)  - Surgery evaluated patient 7/23, may be a candidate for duodenotomy to oversew ulcers, but patient cannot be on aspirin  - As patient is s/p aspirin x 1 dose (7/23), will continue to update surgery and reach out in a couple days to re-evaluate  - Check Helicobacter pylori antigen    #Nutrition  Course:  - FEES (7/17) failed  - ENT consulted for vallecular lesion, diagnosed vallecular cyst, no inpatient intervention required, outpatient f/u  - Patient currently extubated with NG tube (7/24)  Plan:  - Advance feeds  - When ready to start PO diet, speech & swallow evaluation (do not have to do nurse dysphagia screen first), hold off for today    #Diarrhea (improving)  Course:  - c. diff + (7/14)  - started vancomycin (7/14 -7/23) active infection dose, started vancomycin prophylaxis (7/24-)   - having worsening diarrhea, no melena (7/16)  Plan:  - Continue prophylactic dose of vancomycin q12   - Fluids as necessary to compensate for volume loss 2/2 diarrhea    #Transaminitis (improving)  Findings:  - Bili 1.1 (7/16) -> 1.3 (7/17) ->0.5 (7/24)  - Alk P 359 (7/16)-> 484 (7/17) -> 174 (7/24)  -  (7/16)->129 (7/17) -> 75 (7/24)  - ALT 67 (7/16)-> 72 (7/17) -> 30 (7/24)  - RUQ US (7/17): known liver mets, no acute findings  Plan:  - Continue atorvastatin 80 mg daily    INFECTIOUS DISEASE  #leukocytosis (improving)  Cultures:  - Blood cultures negative throughout admission  - Sputum Cx from ETT 7/13 showed numerous gram neg krishna (Klebsiella oxytoca/raoultella ornithinolytica)  - GI PCR negative  - MRSA swab negative  Antibiotics Course:  - Vancomycin (7/14 - 7/23), Vancomycin Prophylaxis (7/24 - )  - Zosyn (7/11-7/18), resistance shown, switched to meropenem  - Meropenem (7/18- )  Plan:  - Continue vancomycin 125 mg q12 for c. diff, prophylaxis  - Continue meropenem 1000 mg q8 until 7/25 (7 day course)  - Continue to monitor for fevers    ENDOCRINE  #adrenal insufficiency  Findings:  -7/5 cortisol 17   - 7/11 cortisol 37.1   Plan:  - Continue hydrocortisone 50mg q6  - Monitor sugars with steroid    HEMATOLOGY/ONCOLOGY   # neuroendocrine tumor (unchanged)  - was on lanreotide then had POD in liver and started on peptide receptor radiotherapy (PRRT)- typically given every 8 weeks for 4 doses. He last received it 10/20/2023   - PET 5/28/24 shows new somatostatin avid osseous lesions; decreased intensely avid right supradiaphragmatic and upper abdominal nodes, suspicious for mets  - Gastrin level 5509  Plan:  - per heme/onc:    - can resume octreotide 150 mcg bid once infection r/o      - can check factor levels V, VIII, X     - "-- f/u with Dr. Sophia Prasad at [facility] [place] after discharge, no plan for treatment inpatient, if clinically improves/stabilizes then can be considered for treatment outpatient"    #Normocytic anemia (worsening)  - Pt with low Hgb 7-9  - ISO of GI bleed hx and continued melanotic BMs, there is c/o of rebleed; per GI, high risk of rebleeding, currently seems to be slow bleeding   Plan:  - Surgery and GI made aware & are following   - Continue checking CBC/BMP q8    #DVT ppx  - SCDs  - LE duplex (7/17): negative for DVT    SKINS:   - Lines/Tubes - PIV, cordis replaced with central line (7/14-7/23), A line (7/20), Subclavian central line (7/24- )    Ethics:   - Full Code   - GOC 7/12, spoke to spouse (Yamilet) over phone with palliative care team, discussed GOC again on 7/16    Consults this admission: GI, Heme Onc, Palliative, Endocrinology, Cardiology, Nephrology, Interventional Radiology, General Surgery   Assessment	  Assessment: 79 yo male with metastatic neuroendocrine pancreatic cancer (tx on hold) and PMH of CAD s/p PCI x3 with most recent stent 6/12/24 on Plavix and eliquis , chronic Afib on eliquis, orthostatic hypotension on midodrine, PAD , recent admission for dx cath on 6/24/24 presented from PCP's office for hypotension despite taking AM midodrine dose on 7/2, admitted to medicine for symptomatic hypotension and RAZIA. Per chart, on 7/8 PM, pt was noted to have acute onset of melena x5 and coffee ground emesis x2-3. RRT was called on 7/9 AM for hypotension, hgb dropped from 9.2 to 7.4 (admission baseline 10-11), FOBT +, pt was started on PPI IV and transfused 1 unit of pRBC. GI was consulted and underwent EGD on 7/9 afternoon. MICU was consulted for uncontrolled bleeding during EGD. During EGD, pt became hypotensive and was given phenylephrine, had A-fib with RVR s/p amiodarone. Now s/p IR GDA embolization, admitted to MICU for further moniring i.s.o hemorrhagic shock 2/2 GI bleed. On repeat EGD, oozing but no active bleeding was noted. Hospital course c/b c.diff, and patient has been placed on vancomycin. Patient had melanotic stool 7/18 with Hgb drop 10.1 to 7.1 and received 2 units pRBC and 1 unit platelets. Patient was reintubated and underwent EGD 7/18 afternoon. GI found duodenal bleed. Patient now s/p 3x clips and epi. Since 7/18 patient has continued to have melanotic stools with hemoglobin drops concerning for slow rebleeding.     - Total transfusions: 16 pRBC, 4 platelet, 5 FFP (as of 7/25)    Plan:     NEUROLOGIC  # Baseline AOx3  Course:  - intubated 7/9, extubated 7/15  - reintubated 7/18 for EGD, extubated 7/22  - not sedated  - Currently AOx1-2  Plan:  - Delirium precautions    CARDIOVASCULAR  # hemorrhagic shock (improving)  Course:  - Per spouse, pt's baseline BP is a little bit over 100  - 7/9: RRT called 7/9 for hypotension; 2/2 to Upper GI bleeds, urgently took to EGD, found bleeding ulcer that was not well controlled, underwent IR GDA embolization  - 7/18, patient had large melanotic stool with Hgb drop from 10.1-7.1, patient was restarted on henny, vaso, and levo for reintubation for GI scope; s/p 2pRBC, 1 platelet  - Total transfusions: 16 pRBC, 4 platelet, 5 FFP  Findings:  - AM cortisol 17 (7/5), AM cortisol 37.1 (7/11)  - POCUS ECHO (7/16) showed no cardiogenic shock, no tamponade, low SV indeterminate IVC, irregular B lines but not concerning for pulmonary edema, and some subdiaphragmatic fluid  Plan:  - Continue to monitor CBC  - Transfuse for Hgb <8, (goal given recent stent)  - Continue droxidopa 100 mg q8  - Off vaso today; continue to monitor off pressors with goal SBP > 100  - Continue to hold Plavix 75 mg  - Continue midodrine 30mg q8  - Continue hydrocortisone 50mg q6 for refractory shock    #CAD s/p PCI x3, most recent stent 6/12/24, NURIA to pLAD (stable)  Course:  - 7/13 restarted plavix for new stent (6/12/24) per GI, 7/18 held plavix due to bleeding  - 7/22 started low-dose aspirin as safer alternative to plavix per cardiology, but d/c 7/23 as patient bled again  Plan:  - Continue to hold Plavix 75 mg and aspirin 81 mg  - Continue atorvastatin 80 mg daily    #PAD  # A-fib on eliquis (improving)  May have been worsened because of GI bleed  - s/p successful DCCV 10/31   - 7/16 In RVR, 2 doses of amio overnight; Cards recommended resuming home dose sotalol 40 mg PO (qTC wnl), s/p 1 dose sotalol but RVR persisted  - 7/16 Started amio loading  - 7/22 Added digoxin load for persistent HR 130s over past week  - 7/23 Dig level 1.3, patient's HR back in 60s in the afternoon  Plan:  - Hold eliquis   - Continue amio 200 mg daily  - Continue digoxin maintenance dose 125 mcg daily  - Hold home sotalol  - Cardiology following, advises against chemical cardioversion off AC if possible    PULMONARY  #Intubated for airway protection prior to EGD/IR embolization (Resolved)  Course:  - Extubated 7/22, SpO2 100% on RA    RENAL/  #RAZIA (resolved)  - Cr peaked at 3.38 (7/11)  - Currently Cr 1.13 ( 7/24)  Plan:  - Continue to monitor Cr    #ATN i.s.o hypotension  #metabolic acidosis (improving)  Patient was in a state of metabolic acidosis around 7/16, but recent ABG pH in normal range. However, patient has been bicarb deficient throughout the admission.   Plan:  - f/u nephro recs  - Continue bicarb 650mg tid considering hypernatremia  - Per nephro, patient is not a dialysis candidate  - trend BUN/Cr   - monitor Is and Os     #Hypernatremia (improving)  Patient having a gradually increasing sodium since transfer to the MICU. Current Na improving at 148 (7/25)  Course:  - 7/22: s/p lasix 40mg and metolazone 5mg   - 7/23: s/p D5W 1L bolus  Plan:  - Continue free water 300mL q6  - Continue lower dose sodium bicarb 650 mg tid as it may be a contributing factor  - Start bumex 1mg q12 IV  - Start metolazone 5mg PO daily    #Urinary retention (improving)  Plan:  - Started Doxazosin 2 mg daily (7/15)  - Monitor I/Os  - Ivory placed by urology 7/19, making urine    GASTROINTESTINAL  #Upper GI Bleed (stable)  Course:  - 7/9 CT A/P - Active bleeding in the third portion of the duodenum. Progression of liver metastases.  - 7/9 EGD showed esophagitis, One non-bleeding duodenal ulcer with a clean ulcer base (Arjun Class III). One oozing duodenal ulcer with spurting hemorrhage (Arjun Class Ia). Treatment not successful. Treated with bipolar cautery.  - 7/9 s/p IR GDA embolization  - 7/18 Patient had melanotic stool with hemoglobin drop 10.1 to 7.1; EGD showing duodenal bleed s/p 3 clips + epi  - CTA (7/20) showed no active bleed  - Total EGDs: 3  Plan:  - Continue pantoprazole 40 mg IV q12  - monitor Hgb, transfuse as needed for Hgb <8  - continue to f/u with GI and IR     - Per GI, "Will need PPI BID for 8 weeks for grade D esophagitis and PUD"    - Per GI, high risk of 30 day rebleed (>10-20% risk)  - No acute IR intervention per IR because no active area of extravasation on imaging (7/21)  - No acute GI intervention per GI (7/21)  - Surgery evaluated patient 7/23, may be a candidate for duodenotomy to oversew ulcers, but patient cannot be on aspirin  - As patient is s/p aspirin x 1 dose (7/23), will continue to update surgery and reach out tomorrow to re-evaluate; patient is currently stable  - F/u Helicobacter pylori antigen    #Nutrition  Course:  - FEES (7/17) failed  - ENT consulted for vallecular lesion, diagnosed vallecular cyst, no inpatient intervention required, outpatient f/u  - Patient currently extubated with NG tube (7/24)  Plan:  - Continue trickle feeds (10 cc/hr)  - When ready to start PO diet, speech & swallow evaluation (do not have to do nurse dysphagia screen first), hold off for today    #Diarrhea (improving)  Course:  - c. diff + (7/14)  - started vancomycin (7/14 -7/23) active infection dose, started vancomycin prophylaxis (7/24-)   - having worsening diarrhea, no melena (7/16)  Plan:  - Continue prophylactic dose of vancomycin q12   - Fluids as necessary to compensate for volume loss 2/2 diarrhea    #Transaminitis (improving)  Findings:  - Bili 1.1 (7/16) -> 1.3 (7/17) ->0.5 (7/24)  - Alk P 359 (7/16)-> 484 (7/17) -> 174 (7/24)  -  (7/16)->129 (7/17) -> 75 (7/24)  - ALT 67 (7/16)-> 72 (7/17) -> 30 (7/24)  - RUQ US (7/17): known liver mets, no acute findings  Plan:  - Continue atorvastatin 80 mg daily    INFECTIOUS DISEASE  #leukocytosis (improving)  Cultures:  - Blood cultures negative throughout admission  - Sputum Cx from ETT 7/13 showed numerous gram neg krishna (Klebsiella oxytoca/raoultella ornithinolytica)  - GI PCR negative  - MRSA swab negative  Antibiotics Course:  - Vancomycin (7/14 - 7/23), Vancomycin Prophylaxis (7/24 - )  - Zosyn (7/11-7/18), resistance shown, switched to meropenem  - Meropenem (7/18- )  Plan:  - Continue vancomycin 125 mg q12 for c. diff, prophylaxis while on meropenem, to be d/c after last meropenem dose today  - Continue meropenem 1000 mg q8 until 7/25 (7 day course), last dose today  - Continue to monitor for fevers    ENDOCRINE  #adrenal insufficiency  Findings:  -7/5 cortisol 17   - 7/11 cortisol 37.1   Plan:  - Continue hydrocortisone 50mg q6  - Monitor sugars with steroid    HEMATOLOGY/ONCOLOGY   # neuroendocrine tumor (unchanged)  - was on lanreotide then had POD in liver and started on peptide receptor radiotherapy (PRRT)- typically given every 8 weeks for 4 doses. He last received it 10/20/2023   - PET 5/28/24 shows new somatostatin avid osseous lesions; decreased intensely avid right supradiaphragmatic and upper abdominal nodes, suspicious for mets  - Gastrin level 5509  Plan:  - per heme/onc:    - can resume octreotide 150 mcg bid once infection r/o      - can check factor levels V, VIII, X     - "-- f/u with Dr. Sophia Prasad at [facility] [place] after discharge, no plan for treatment inpatient, if clinically improves/stabilizes then can be considered for treatment outpatient"    #Normocytic anemia (stable)  - Pt with low Hgb 7-9  - ISO of GI bleed hx and continued melanotic BMs, there is c/o of rebleed; per GI, high risk of rebleeding, currently seems to be slow bleeding   Plan:  - Surgery and GI made aware & are following   - Change CBC to q12 unless having large melanotic stools    #DVT ppx  - SCDs  - LE duplex (7/17): negative for DVT    SKINS:   - Lines/Tubes - PIV, cordis replaced with central line (7/14-7/23), A line (7/20), Subclavian central line (7/24- )    Ethics:   - Full Code   - GOC 7/12, spoke to spouse (Yamilet) over phone with palliative care team, discussed GOC again on 7/16    Consults this admission: GI, Heme Onc, Palliative, Endocrinology, Cardiology, Nephrology, Interventional Radiology, General Surgery   Assessment	  Assessment: 77 yo male with metastatic neuroendocrine pancreatic cancer (tx on hold) and PMH of CAD s/p PCI x3 with most recent stent 6/12/24 on Plavix and eliquis , chronic Afib on eliquis, orthostatic hypotension on midodrine, PAD , recent admission for dx cath on 6/24/24 presented from PCP's office for hypotension despite taking AM midodrine dose on 7/2, admitted to medicine for symptomatic hypotension and RAZIA. Per chart, on 7/8 PM, pt was noted to have acute onset of melena x5 and coffee ground emesis x2-3. RRT was called on 7/9 AM for hypotension, hgb dropped from 9.2 to 7.4 (admission baseline 10-11), FOBT +, pt was started on PPI IV and transfused 1 unit of pRBC. GI was consulted and underwent EGD on 7/9 afternoon. MICU was consulted for uncontrolled bleeding during EGD. During EGD, pt became hypotensive and was given phenylephrine, had A-fib with RVR s/p amiodarone. Now s/p IR GDA embolization, admitted to MICU for further moniring i.s.o hemorrhagic shock 2/2 GI bleed. On repeat EGD, oozing but no active bleeding was noted. Hospital course c/b c.diff, and patient has been placed on vancomycin. Patient had melanotic stool 7/18 with Hgb drop 10.1 to 7.1 and received 2 units pRBC and 1 unit platelets. Patient was reintubated and underwent EGD 7/18 afternoon. GI found duodenal bleed. Patient now s/p 3x clips and epi. Since 7/18 patient has continued to have melanotic stools with hemoglobin drops concerning for slow rebleeding.     - Total transfusions: 16 pRBC, 4 platelet, 5 FFP (as of 7/25)    Plan:     NEUROLOGIC  # Baseline AOx3 (waxing/waning)  Course:  - intubated 7/9, extubated 7/15  - reintubated 7/18 for EGD, extubated 7/22  - not sedated  - Currently AOx1-2  Plan:  - Delirium precautions    CARDIOVASCULAR  # hemorrhagic shock (improving)  Course:  - Per spouse, pt's baseline BP is a little bit over 100  - 7/9: RRT called 7/9 for hypotension; 2/2 to Upper GI bleeds, urgently took to EGD, found bleeding ulcer that was not well controlled, underwent IR GDA embolization  - 7/18, patient had large melanotic stool with Hgb drop from 10.1-7.1, patient was restarted on henny, vaso, and levo for reintubation for GI scope; s/p 2pRBC, 1 platelet  - Total transfusions: 16 pRBC, 4 platelet, 5 FFP  Findings:  - AM cortisol 17 (7/5), AM cortisol 37.1 (7/11)  - POCUS ECHO (7/16) showed no cardiogenic shock, no tamponade, low SV indeterminate IVC, irregular B lines but not concerning for pulmonary edema, and some subdiaphragmatic fluid  Plan:  - Continue to monitor CBC  - Transfuse for Hgb <8, (goal given recent stent)  - Continue droxidopa 100 mg q8  - Off vaso today; continue to monitor off pressors with goal SBP > 100  - Continue to hold Plavix 75 mg  - Continue midodrine 30mg q8  - Continue hydrocortisone 50mg q6 for refractory shock    #CAD s/p PCI x3, most recent stent 6/12/24, NURIA to pLAD (stable)  Course:  - 7/13 restarted plavix for new stent (6/12/24) per GI, 7/18 held plavix due to bleeding  - 7/22 started low-dose aspirin as safer alternative to plavix per cardiology, but d/c 7/23 as patient bled again  Plan:  - Continue to hold Plavix 75 mg and aspirin 81 mg  - Continue atorvastatin 80 mg daily    #PAD  # A-fib on eliquis (improving)  May have been worsened because of GI bleed  - s/p successful DCCV 10/31   - 7/16 In RVR, 2 doses of amio overnight; Cards recommended resuming home dose sotalol 40 mg PO (qTC wnl), s/p 1 dose sotalol but RVR persisted  - 7/16 Started amio loading  - 7/22 Added digoxin load for persistent HR 130s over past week  - 7/23 Dig level 1.3, patient's HR back in 60s in the afternoon  Plan:  - Hold eliquis   - Continue amio 200 mg daily  - Continue digoxin maintenance dose 125 mcg daily  - Hold home sotalol  - Cardiology following, advises against chemical cardioversion off AC if possible    PULMONARY  #Intubated for airway protection prior to EGD/IR embolization (Resolved)  Course:  - Extubated 7/22, SpO2 100% on RA    RENAL/  #RAZIA (resolved)  - Cr peaked at 3.38 (7/11)  - Currently Cr 1.13 ( 7/24)  Plan:  - Continue to monitor Cr    #ATN i.s.o hypotension  #metabolic acidosis (improving)  Patient was in a state of metabolic acidosis around 7/16, but recent ABG pH in normal range. However, patient has been bicarb deficient throughout the admission.   Plan:  - f/u nephro recs  - Continue bicarb 650mg tid considering hypernatremia  - Per nephro, patient is not a dialysis candidate  - trend BUN/Cr   - monitor Is and Os     #Hypernatremia (improving)  Patient having a gradually increasing sodium since transfer to the MICU. Current Na improving at 148 (7/25)  Course:  - 7/22: s/p lasix 40mg and metolazone 5mg   - 7/23: s/p D5W 1L bolus  Plan:  - Continue free water 300mL q6  - Continue lower dose sodium bicarb 650 mg tid as it may be a contributing factor  - Start bumex 1mg q12 IV  - Start metolazone 5mg PO daily    #Urinary retention (improving)  Plan:  - Started Doxazosin 2 mg daily (7/15)  - Monitor I/Os  - Ivory placed by urology 7/19, making urine    GASTROINTESTINAL  #Upper GI Bleed (stable)  Course:  - 7/9 CT A/P - Active bleeding in the third portion of the duodenum. Progression of liver metastases.  - 7/9 EGD showed esophagitis, One non-bleeding duodenal ulcer with a clean ulcer base (Arjun Class III). One oozing duodenal ulcer with spurting hemorrhage (Arjun Class Ia). Treatment not successful. Treated with bipolar cautery.  - 7/9 s/p IR GDA embolization  - 7/18 Patient had melanotic stool with hemoglobin drop 10.1 to 7.1; EGD showing duodenal bleed s/p 3 clips + epi  - CTA (7/20) showed no active bleed  - Total EGDs: 3  Plan:  - Continue pantoprazole 40 mg IV q12  - monitor Hgb, transfuse as needed for Hgb <8  - continue to f/u with GI and IR     - Per GI, "Will need PPI BID for 8 weeks for grade D esophagitis and PUD"    - Per GI, high risk of 30 day rebleed (>10-20% risk)  - No acute IR intervention per IR because no active area of extravasation on imaging (7/21)  - No acute GI intervention per GI (7/21)  - Surgery evaluated patient 7/23, may be a candidate for duodenotomy to oversew ulcers, but patient cannot be on aspirin  - As patient is s/p aspirin x 1 dose (7/23), will continue to update surgery and reach out tomorrow to re-evaluate; patient is currently stable  - F/u Helicobacter pylori antigen    #Nutrition  Course:  - FEES (7/17) failed  - ENT consulted for vallecular lesion, diagnosed vallecular cyst, no inpatient intervention required, outpatient f/u  - Patient currently extubated with NG tube (7/24)  Plan:  - Continue trickle feeds (10 cc/hr)  - When ready to start PO diet, speech & swallow evaluation (do not have to do nurse dysphagia screen first), hold off for today    #Diarrhea (improving)  Course:  - c. diff + (7/14)  - started vancomycin (7/14 -7/23) active infection dose, started vancomycin prophylaxis (7/24-)   - having worsening diarrhea, no melena (7/16)  Plan:  - Continue prophylactic dose of vancomycin q12   - Fluids as necessary to compensate for volume loss 2/2 diarrhea    #Transaminitis (improving)  Findings:  - Bili 1.1 (7/16) -> 1.3 (7/17) ->0.5 (7/24)  - Alk P 359 (7/16)-> 484 (7/17) -> 174 (7/24)  -  (7/16)->129 (7/17) -> 75 (7/24)  - ALT 67 (7/16)-> 72 (7/17) -> 30 (7/24)  - RUQ US (7/17): known liver mets, no acute findings  Plan:  - Continue atorvastatin 80 mg daily    INFECTIOUS DISEASE  #leukocytosis (improving)  Cultures:  - Blood cultures negative throughout admission  - Sputum Cx from ETT 7/13 showed numerous gram neg krishna (Klebsiella oxytoca/raoultella ornithinolytica)  - GI PCR negative  - MRSA swab negative  Antibiotics Course:  - Vancomycin (7/14 - 7/23), Vancomycin Prophylaxis (7/24 - )  - Zosyn (7/11-7/18), resistance shown, switched to meropenem  - Meropenem (7/18- )  Plan:  - Continue vancomycin 125 mg q12 for c. diff, prophylaxis while on meropenem, to be d/c after last meropenem dose today  - Continue meropenem 1000 mg q8 until 7/25 (7 day course), last dose today  - Continue to monitor for fevers    ENDOCRINE  #adrenal insufficiency (stable)  Findings:  -7/5 cortisol 17   - 7/11 cortisol 37.1   Plan:  - Continue hydrocortisone 50mg q6  - Monitor sugars with steroid    HEMATOLOGY/ONCOLOGY   # neuroendocrine tumor (unchanged)  - was on lanreotide then had POD in liver and started on peptide receptor radiotherapy (PRRT)- typically given every 8 weeks for 4 doses. He last received it 10/20/2023   - PET 5/28/24 shows new somatostatin avid osseous lesions; decreased intensely avid right supradiaphragmatic and upper abdominal nodes, suspicious for mets  - Gastrin level 5509  Plan:  - per heme/onc:    - can resume octreotide 150 mcg bid once infection r/o      - can check factor levels V, VIII, X     - "-- f/u with Dr. Sophia Prasad at [facility] [place] after [place], no plan for treatment inpatient, if clinically improves/stabilizes then can be considered for treatment outpatient"    #Normocytic anemia (stable)  - Pt with low Hgb 7-9  - ISO of GI bleed hx and continued melanotic BMs, there is c/o of rebleed; per GI, high risk of rebleeding, currently seems to be slow bleeding   Plan:  - Surgery and GI made aware & are following   - Change CBC to q12 unless having large melanotic stools    #DVT ppx (unchanged)  - SCDs  - LE duplex (7/17): negative for DVT    SKINS:   - Lines/Tubes - PIV, cordis replaced with central line (7/14-7/23), A line (7/20), Subclavian central line (7/24- )    Ethics:   - Full Code   - GOC 7/12, spoke to spouse (Yamilet) over phone with palliative care team, discussed GOC again on 7/16    Consults this admission: GI, Heme Onc, Palliative, Endocrinology, Cardiology, Nephrology, Interventional Radiology, General Surgery   Assessment	  Assessment: 77 yo male with metastatic neuroendocrine pancreatic cancer (tx on hold) and PMH of CAD s/p PCI x3 with most recent stent 6/12/24 on Plavix and eliquis , chronic Afib on eliquis, orthostatic hypotension on midodrine, PAD , recent admission for dx cath on 6/24/24 presented from PCP's office for hypotension despite taking AM midodrine dose on 7/2, admitted to medicine for symptomatic hypotension and RAZIA. Per chart, on 7/8 PM, pt was noted to have acute onset of melena x5 and coffee ground emesis x2-3. RRT was called on 7/9 AM for hypotension, hgb dropped from 9.2 to 7.4 (admission baseline 10-11), FOBT +, pt was started on PPI IV and transfused 1 unit of pRBC. GI was consulted and underwent EGD on 7/9 afternoon. MICU was consulted for uncontrolled bleeding during EGD. During EGD, pt became hypotensive and was given phenylephrine, had A-fib with RVR s/p amiodarone. Now s/p IR GDA embolization, admitted to MICU for further moniring i.s.o hemorrhagic shock 2/2 GI bleed. On repeat EGD, oozing but no active bleeding was noted. Hospital course c/b c.diff, and patient has been placed on vancomycin. Patient had melanotic stool 7/18 with Hgb drop 10.1 to 7.1 and received 2 units pRBC and 1 unit platelets. Patient was reintubated and underwent EGD 7/18 afternoon. GI found duodenal bleed. Patient now s/p 3x clips and epi. Since 7/18 patient has continued to have melanotic stools with hemoglobin drops concerning for slow rebleeding.     - Total transfusions: 16 pRBC, 4 platelet, 5 FFP (as of 7/25)    Plan:     NEUROLOGIC  # Baseline AOx3 (waxing/waning)  Course:  - intubated 7/9, extubated 7/15  - reintubated 7/18 for EGD, extubated 7/22  - not sedated  - Currently AOx1-2  Plan:  - Delirium precautions    CARDIOVASCULAR  # hemorrhagic shock (improving)  Course:  - Per spouse, pt's baseline BP is a little bit over 100  - 7/9: RRT called 7/9 for hypotension; 2/2 to Upper GI bleeds, urgently took to EGD, found bleeding ulcer that was not well controlled, underwent IR GDA embolization  - 7/18, patient had large melanotic stool with Hgb drop from 10.1-7.1, patient was restarted on henny, vaso, and levo for reintubation for GI scope; s/p 2pRBC, 1 platelet  - Total transfusions: 16 pRBC, 4 platelet, 5 FFP  Findings:  - AM cortisol 17 (7/5), AM cortisol 37.1 (7/11)  - POCUS ECHO (7/16) showed no cardiogenic shock, no tamponade, low SV indeterminate IVC, irregular B lines but not concerning for pulmonary edema, and some subdiaphragmatic fluid  Plan:  - Continue to monitor CBC  - Transfuse for Hgb <8, (goal given recent stent)  - Increase droxidopa to 200 mg q8  - Try to wean off vaso today; continue to monitor off pressors with goal SBP > 100  - Continue to hold Plavix 75 mg  - Continue midodrine 30mg q8  - Continue hydrocortisone 50mg q6 for refractory shock    #CAD s/p PCI x3, most recent stent 6/12/24, NURIA to pLAD (stable)  Course:  - 7/13 restarted plavix for new stent (6/12/24) per GI, 7/18 held plavix due to bleeding  - 7/22 started low-dose aspirin as safer alternative to plavix per cardiology, but d/c 7/23 as patient bled again  Plan:  - Continue to hold Plavix 75 mg and aspirin 81 mg  - Continue atorvastatin 80 mg daily    #PAD  # A-fib on eliquis (improving)  May have been worsened because of GI bleed  - s/p successful DCCV 10/31   - 7/16 In RVR, 2 doses of amio overnight; Cards recommended resuming home dose sotalol 40 mg PO (qTC wnl), s/p 1 dose sotalol but RVR persisted  - 7/16 Started amio loading  - 7/22 Added digoxin load for persistent HR 130s over past week  - 7/23 Dig level 1.3, patient's HR back in 60s in the afternoon  Plan:  - Hold eliquis   - Continue amio 200 mg daily  - Continue digoxin maintenance dose 125 mcg daily  - Hold home sotalol  - Cardiology following, advises against chemical cardioversion off AC if possible    PULMONARY  #Intubated for airway protection prior to EGD/IR embolization (Resolved)  Course:  - Extubated 7/22, SpO2 100% on RA    RENAL/  #RAZIA (resolved)  - Cr peaked at 3.38 (7/11)  - Currently Cr 1.13 ( 7/24)  Plan:  - Continue to monitor Cr    #ATN i.s.o hypotension  #metabolic acidosis (improving)  Patient was in a state of metabolic acidosis around 7/16, but recent ABG pH in normal range. However, patient has been bicarb deficient throughout the admission.   Plan:  - f/u nephro recs  - Continue bicarb 650mg tid considering hypernatremia  - Per nephro, patient is not a dialysis candidate  - trend BUN/Cr   - monitor Is and Os     #Hypernatremia (improving)  Patient having a gradually increasing sodium since transfer to the MICU. Current Na improving at 148 (7/25)  Course:  - 7/22: s/p lasix 40mg and metolazone 5mg   - 7/23: s/p D5W 1L bolus  Plan:  - Continue free water 300mL q6  - Continue lower dose sodium bicarb 650 mg tid as it may be a contributing factor  - Start bumex 1mg q12 IV  - Start metolazone 5mg PO daily    #Urinary retention (improving)  Plan:  - Started Doxazosin 2 mg daily (7/15)  - Monitor I/Os  - Ivory placed by urology 7/19, making urine    GASTROINTESTINAL  #Upper GI Bleed (stable)  Course:  - 7/9 CT A/P - Active bleeding in the third portion of the duodenum. Progression of liver metastases.  - 7/9 EGD showed esophagitis, One non-bleeding duodenal ulcer with a clean ulcer base (Arjun Class III). One oozing duodenal ulcer with spurting hemorrhage (Arjun Class Ia). Treatment not successful. Treated with bipolar cautery.  - 7/9 s/p IR GDA embolization  - 7/18 Patient had melanotic stool with hemoglobin drop 10.1 to 7.1; EGD showing duodenal bleed s/p 3 clips + epi  - CTA (7/20) showed no active bleed  - Total EGDs: 3  Plan:  - Continue pantoprazole 40 mg IV q12  - monitor Hgb, transfuse as needed for Hgb <8  - continue to f/u with GI and IR     - Per GI, "Will need PPI BID for 8 weeks for grade D esophagitis and PUD"    - Per GI, high risk of 30 day rebleed (>10-20% risk)  - No acute IR intervention per IR because no active area of extravasation on imaging (7/21)  - No acute GI intervention per GI (7/21)  - Surgery evaluated patient 7/23, may be a candidate for duodenotomy to oversew ulcers, but patient cannot be on aspirin  - As patient is s/p aspirin x 1 dose (7/23), will continue to update surgery and reach out tomorrow to re-evaluate; patient is currently stable  - F/u Helicobacter pylori antigen    #Nutrition  Course:  - FEES (7/17) failed  - ENT consulted for vallecular lesion, diagnosed vallecular cyst, no inpatient intervention required, outpatient f/u  - Patient currently extubated with NG tube (7/24)  Plan:  - Continue trickle feeds (10 cc/hr)  - When ready to start PO diet, speech & swallow evaluation (do not have to do nurse dysphagia screen first), hold off for today    #Diarrhea (improving)  Course:  - c. diff + (7/14)  - started vancomycin (7/14 -7/23) active infection dose, started vancomycin prophylaxis (7/24-)   - having worsening diarrhea, no melena (7/16)  Plan:  - Continue prophylactic dose of vancomycin q12   - Fluids as necessary to compensate for volume loss 2/2 diarrhea    #Transaminitis (improving)  Findings:  - Bili 1.1 (7/16) -> 1.3 (7/17) ->0.5 (7/24)  - Alk P 359 (7/16)-> 484 (7/17) -> 174 (7/24)  -  (7/16)->129 (7/17) -> 75 (7/24)  - ALT 67 (7/16)-> 72 (7/17) -> 30 (7/24)  - RUQ US (7/17): known liver mets, no acute findings  Plan:  - Continue atorvastatin 80 mg daily    INFECTIOUS DISEASE  #leukocytosis (improving)  Cultures:  - Blood cultures negative throughout admission  - Sputum Cx from ETT 7/13 showed numerous gram neg krishna (Klebsiella oxytoca/raoultella ornithinolytica)  - GI PCR negative  - MRSA swab negative  Antibiotics Course:  - Vancomycin (7/14 - 7/23), Vancomycin Prophylaxis (7/24 - )  - Zosyn (7/11-7/18), resistance shown, switched to meropenem  - Meropenem (7/18- )  Plan:  - Continue vancomycin 125 mg q12 for c. diff, prophylaxis while on meropenem, to be d/c after last meropenem dose today  - Continue meropenem 1000 mg q8 until 7/25 (7 day course), last dose today  - Continue to monitor for fevers    ENDOCRINE  #adrenal insufficiency (stable)  Findings:  -7/5 cortisol 17   - 7/11 cortisol 37.1   Plan:  - Continue hydrocortisone 50mg q6  - Monitor sugars with steroid    HEMATOLOGY/ONCOLOGY   # neuroendocrine tumor (unchanged)  - was on lanreotide then had POD in liver and started on peptide receptor radiotherapy (PRRT)- typically given every 8 weeks for 4 doses. He last received it 10/20/2023   - PET 5/28/24 shows new somatostatin avid osseous lesions; decreased intensely avid right supradiaphragmatic and upper abdominal nodes, suspicious for mets  - Gastrin level 5509  Plan:  - per heme/onc:    - can resume octreotide 150 mcg bid once infection r/o      - can check factor levels V, VIII, X     - "-- [place] [facility] [place] no plan for treatment inpatient, if clinically improves/stabilizes then can be considered for treatment outpatient"    #Normocytic anemia (stable)  - Pt with low Hgb 7-9  - ISO of GI bleed hx and continued melanotic BMs, there is c/o of rebleed; per GI, high risk of rebleeding, currently seems to be slow bleeding   Plan:  - Surgery and GI made aware & are following   - Change CBC to q12 unless having large melanotic stools    #DVT ppx (unchanged)  - SCDs  - LE duplex (7/17): negative for DVT    SKINS:   - Lines/Tubes - PIV, cordis replaced with central line (7/14-7/23), A line (7/20), Subclavian central line (7/24- )    Ethics:   - Full Code   - GOC 7/12, spoke to spouse (Yamilet) over phone with palliative care team, discussed GOC again on 7/16    Consults this admission: GI, Heme Onc, Palliative, Endocrinology, Cardiology, Nephrology, Interventional Radiology, General Surgery

## 2024-07-25 NOTE — CHART NOTE - NSCHARTNOTEFT_GEN_A_CORE
: Hari Moore     INDICATION:    PROCEDURE:  [X ] LIMITED ECHO  [X ] LIMITED CHEST  [ ] LIMITED RETROPERITONEAL  [ ] LIMITED ABDOMINAL  [ ] LIMITED DVT  [ ] NEEDLE GUIDANCE VASCULAR  [ ] NEEDLE GUIDANCE THORACENTESIS  [ ] NEEDLE GUIDANCE PARACENTESIS  [ ] NEEDLE GUIDANCE PERICARDIOCENTESIS  [ ] OTHER    FINDINGS/INTERPRETATION:  Bilateral B line profile, pleural sliding present. LVEF conserved. IVC  2cm.

## 2024-07-25 NOTE — PROGRESS NOTE ADULT - SUBJECTIVE AND OBJECTIVE BOX
DATE OF SERVICE: 07-25-24 @ 16:50    Patient is a 78y old  Male who presents with a chief complaint of hypotension (25 Jul 2024 14:41)      INTERVAL HISTORY: Feels ok. Has a lot of pain.     REVIEW OF SYSTEMS:  CONSTITUTIONAL: No weakness  EYES/ENT: No visual changes;  No throat pain   NECK: No pain or stiffness  RESPIRATORY: No cough, wheezing; No shortness of breath  CARDIOVASCULAR: No chest pain or palpitations  GASTROINTESTINAL: No abdominal  pain. No nausea, vomiting, or hematemesis  GENITOURINARY: No dysuria, frequency or hematuria  NEUROLOGICAL: No stroke like symptoms  SKIN: No rashes    TELEMETRY Personally reviewed: AF 50-90 with run of WCT  	  MEDICATIONS:  aMIOdarone    Tablet 200 milliGRAM(s) Oral daily  aMIOdarone    Tablet   Oral   buMETAnide Injectable 1 milliGRAM(s) IV Push two times a day  digoxin     Tablet 125 MICROGram(s) Oral daily  doxazosin 2 milliGRAM(s) Oral at bedtime  droxidopa 200 milliGRAM(s) Oral every 8 hours  metolazone 5 milliGRAM(s) Oral daily  midodrine 30 milliGRAM(s) Oral every 8 hours        PHYSICAL EXAM:  T(C): 36.6 (07-25-24 @ 12:00), Max: 36.9 (07-24-24 @ 20:00)  HR: 68 (07-25-24 @ 15:03) (64 - 114)  BP: --  RR: 14 (07-25-24 @ 15:03) (12 - 26)  SpO2: 100% (07-25-24 @ 15:03) (100% - 100%)  Wt(kg): --  I&O's Summary    24 Jul 2024 07:01  -  25 Jul 2024 07:00  --------------------------------------------------------  IN: 2726.3 mL / OUT: 3722 mL / NET: -995.7 mL    25 Jul 2024 07:01  -  25 Jul 2024 16:50  --------------------------------------------------------  IN: 406 mL / OUT: 800 mL / NET: -394 mL          Appearance: In no distress	  HEENT:    PERRL, EOMI	  Cardiovascular:  S1 S2, + JVD  Respiratory: Lungs clear to auscultation	  Gastrointestinal:  Soft, Non-tender, + BS	  Vascularature:  + edema of LE  Psychiatric: Appropriate affect   Neuro: no acute focal deficits                               9.1    9.85  )-----------( 98       ( 25 Jul 2024 10:05 )             27.2     07-25    148<H>  |  118<H>  |  45<H>  ----------------------------<  176<H>  3.5   |  19<L>  |  1.06    Ca    7.9<L>      25 Jul 2024 10:05  Phos  2.9     07-25  Mg     1.9     07-25    TPro  4.2<L>  /  Alb  2.2<L>  /  TBili  0.5  /  DBili  x   /  AST  75<H>  /  ALT  30  /  AlkPhos  174<H>  07-24        Labs personally reviewed      ASSESSMENT/PLAN: 	    78-year-old male multiple medical problems history of hepatocellular carcinoma status post radiation therapy, AF s/p DCCV on Eliquis who was found to be hypotensive following NST. Patient has reported general weakness, fatigue, lightheadedness since being discharged from hospital. Reports mild chest discomfort in midsternal region. Reports dyspnea with minimal exertion. Also reports significant lack of appetite and poor PO intake. No dark or bloody stool, nausea or vomiting.       Problem/Plan - 1:  ·  Problem: Hypotension  ·  Plan: Continue with pressors in MICU  - Patient presents with hypotension and also RAZIA. Has known orthostatic hypotension.   - Patient and wife report decreased PO intake likely 2/2 malignancy.  - GIB 7/9, s/p 4 units prbc, IR for mesenteric embolization, now in MICU on pressors  - c/w Midodrine, droxidopa and IV pressors (wean as tolerated as per MICU)     Problem/Plan - 2:  ·  Problem: CAD.   ·  Plan: Recent PCI to pLAD in June 2023  - ECG non-ischemic  - Plavix held given large melena; ideally should be on Plavix but high risk to resume      Problem/Plan - 3:  ·  Problem: Atrial Fibrillation  - s/p succesful DCCV 10/31  - Hold Eliquis 5mg BID given GIB  -  c/w Amio 200mg TID per ICU  - c.w dig 125mcg PO daily  - However would advise against chemical cardioversion off AC if possible        Sulma Espinosa, ROSIO-NP   Hank Hayes DO [facility]  Cardiovascular Medicine  [address] [place] [address], [address]  Available through call or text on [employer]  Office: [phone]

## 2024-07-25 NOTE — PROGRESS NOTE ADULT - ATTENDING COMMENTS
78M w/ CAD (recent stent 6/12/24), Afib, metastatic neuroendocrine pancreatic tumor. Admitted w/ hypotension, course complicated by ABLA and GIB. Undergone EGD #1 on 7/9 which showed uncontrolled bleeding, after which pt developed Afib w/ RVR and hemorrhagic shock, s/p IR for GDA embolization. EGD #2 on 7/12 showed oozing but no active bleeding. Course further complicated by Cdiff colitis. Had another episode of melena and ABLA on 7/18 s/p EGD #3 showed bleeding vessel s/p clipx3 and epi. Pt remains intubated post-procedure and has since been extubated. Continues to have intermittent melanotic stools, and in the last 36 hours has required 2 pRBCs, 1 plt and 1 FFP for continued melena and ABLA. Remains in shock state as well.     Awake and alert. SHock state improving, etiology hemorrhagic shock, unable to titrate off vasopressin, appears to have wide PP, so maintain SBP >100; continue midodrine and droxidopa, stress dose steroids; afib w/ RVR resolved s/p IV digoxin load, continue digoxin and amiodarone; attempted asa challenge but then bled again, keep off DAPT, known recent NURIA in 6/2024. Satting well on RA. S/P 10 days of vancomycin PO for C diff; continue meropenam for Klebsiella pneumonia, plan for 7 day course - last day today; otherwise all cx NGTD. RAZIA likely prerenal ATN, stable, non-oliguric; continue diuresis as pt is grossly volume overloaded but needs aggressive repletion of electrolytes; continue free water for hyperNa; tavarez placed by urology - no plan to remove for now. NGT in place as pt has failed several swallow evaluations prior, continue trickle feeds today; surgery holding off any intervention for now in setting of recent asa, f/u if continues to bleed several days after asa; continue PPI q12; s/p EGD x3 and GDA embolization for multiple GIB; do not believe octreotide will be helpful here as pt is not having diarrhea. ABLA due to GIB, received additional pRBC and now hgb stable; count is 16:4:5; CBC q12 today. FS w/ ISS coverage. Prognosis guarded. Full code

## 2024-07-25 NOTE — PROGRESS NOTE ADULT - ASSESSMENT
79 yo male with PMH of CAD s/p PCI x3 with most recent stent 6/12/24 on Plavix, chronic Afib on eliquis, orthostatic hypotension on midodrine, PAD, neuroendocrine tumor with RT currently on hold, recent admission for dx cath on 6/24/24 who presented for hypotension, admitted for GIB and hemorrhagic shock. Found to be bleeding from duodenum. Transferred to MICU, on pressors.     1. Pancreatic NET- metastatic   -- was on lanreotide then had POD in liver and started on peptide receptor radiotherapy (PRRT)- typically given every 8 weeks for 4 doses. He received one dose on 10/20/2023 and planned to get his 2nd dose at the end of December however his course was complicated by multiple hospitalizations that were noncancer related (decompensated heart failure).   -- 5/28/24 PET Dotatate (compared to 9/2023): several new somatostatin avid osseous lesions, some faintly sclerotic, suspicious for mets. Increased upper RP nodes, probably metastatic. Decreased intensely tracer avid right supradiaphragmatic and upper abdominal nodes, suspicious for metastasis. Redemonstrated intensely somatostatin avid bilobar hepatic lesions, consistent with metastases again morphologically difficult to delineate.   -- CT reviewed by [facility]: SINCE MAY 8 2024 INCREASED HEPATIC METASTASES, NEWLY SEEN PRIMARY TUMOR IN THE PANCREAS, and active bleeding within the duodenum with associated intraluminal hematoma.   -- chromogranin level is elevated at 2058, but no prior level at [facility] for review   -- f/u with Dr. Sophai Prasad at [facility] after discharge, no plan for treatment inpatient, if clinically improves/stabilizes then can be considered for treatment outpatient    2. Duodenal bleed, Hemorrhagic shock  - Remains in MICU, s/p extubation 7/22. NG tube in place.  - CT w/ bleed in duodenum. GI called, EGD 7/9 -> S/p  IR embolization 7/9, intubated in MICU -> s/p extubation 7/15 -> intubated 7/18  - s/p multiple transfusions, fibrinogen low would recommend Cry for < 100, but coags have improved as are essentially normal now so unlikely, probably was related to liver dysfunction.   - s/p repeat EGD 7/12 showing duodenal lesions w/clots and oozing, no clear targets for intervention and no active bleeding, purastat applied to all areas of oozing.   - S/p EGD 7/18: duodenal ulcer with active oozing, ulcer with visible vessel. Bleeding treated.   - S/p 2 units pRBC 7/23, platelets and plasma  - Per GI, high risk for rebleed, will continue PPI.   - Repeat CT AP w/ No evidence of GI bleed.  Interval endoscopic versus surgical intervention with metallic streak artifact suggestive of surgical clips in the region of the proximal one third portions of the duodenum. Evaluation of the previously seen hematoma is limited secondary to this artifact. Moderate bilateral pleural effusions and associated compressive atelectasis, right greater than left, overall slightly increased from previous CT. Anasarca.  - Per IR, In the absences of any active extravasation on CTA there's no place to target for an embolization  - Surgery monitoring for further bleeding. If significant recurrence of UGIB, may consider emergent open duodenotomy   - MICU managing antibiotics, on meropenem given fevers. Patient received ASA on 7/23, plan to have surgery re-evaluate in a few days  - Considert octreotide 250 mcg TID    3. C. diff diarrhea  - on isolation  - c/w abx    Christen Rosales NP  Hematology/ Oncology  [facility]  [phone] (office)  [phone] (alt office)  Evenings and weekends please call MD on call or office

## 2024-07-25 NOTE — PROGRESS NOTE ADULT - SUBJECTIVE AND OBJECTIVE BOX
Jodie Okeefe MD PGY-1  ---------------------------------------------------------------------------------------------  Patient is a 78y old  Male who presents with a chief complaint of hypotension (20 Jul 2024 22:17)    HPI:  79 yo male with PMH of CAD s/p PCI x3 with most recent stent 6/12/24 on Plavix, chronic Afib on eliquis, orthostatic hypotension on midodrine, PAD, 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 Patient states since discharge on this past Saturday, he continued to feel ongoing generalized weakness and dizziness with positional changes despite compliance with home midodrine 15mg TID and holding torsemide as instructed. Admits to poor PO intake of fluids/solute due to ongoing issues with poor appetite and nausea with meals which is not new. Denies any fever, chills, chest pain, SOB, cough, abd pain, dysuria nor urinary frequency. Has chronic diarrhea that is unchanged from his baseline.     In the ED, vitals notable for SBP 92-11s. Labs notable for elevated BUN/Cr 31/1.59 (from 30/1.18 on day of discharge 6/29/24). CXR with clear lungs. Abd US with innumerable intrahepatic echogenic masses consistent with known metastatic neuroendocrine tumor. Admitted to medicine for symptomatic hypotension and RAZIA. (02 Jul 2024 18:34)    SUBJECTIVE / OVERNIGHT EVENTS:  Overnight,     Today, patient examined at bedside. He denies pain or SOB. He has no complaints.    Gtt vaso   IVF  Ivory in place    Vitals: HR , SpO2 % RA, BP , RR , temp     MEDICATIONS  (STANDING):  aMIOdarone    Tablet 400 milliGRAM(s) Oral every 8 hours  aMIOdarone    Tablet 200 milliGRAM(s) Oral daily  aMIOdarone    Tablet   Oral   atorvastatin 80 milliGRAM(s) Oral at bedtime  chlorhexidine 0.12% Liquid 15 milliLiter(s) Oral Mucosa every 12 hours  chlorhexidine 2% Cloths 1 Application(s) Topical <User Schedule>  dexMEDEtomidine Infusion 0.1 MICROgram(s)/kG/Hr (2.47 mL/Hr) IV Continuous <Continuous>  doxazosin 2 milliGRAM(s) Oral at bedtime  folic acid Injectable 1 milliGRAM(s) IV Push daily  insulin lispro (ADMELOG) corrective regimen sliding scale   SubCutaneous every 6 hours  meropenem  IVPB 1000 milliGRAM(s) IV Intermittent every 8 hours  meropenem  IVPB      midodrine 30 milliGRAM(s) Oral every 8 hours  mupirocin 2% Nasal 1 Application(s) Both Nostrils two times a day  norepinephrine Infusion 0.05 MICROgram(s)/kG/Min (9.25 mL/Hr) IV Continuous <Continuous>  pantoprazole Infusion 8 mG/Hr (10 mL/Hr) IV Continuous <Continuous>  phenylephrine    Infusion 0.1 MICROgram(s)/kG/Min (1.85 mL/Hr) IV Continuous <Continuous>  propofol Infusion 20 MICROgram(s)/kG/Min (11.8 mL/Hr) IV Continuous <Continuous>  sodium bicarbonate 1300 milliGRAM(s) Oral three times a day  thiamine Injectable 100 milliGRAM(s) IV Push daily  vancomycin    Solution 125 milliGRAM(s) Oral every 6 hours  vasopressin Infusion 0.04 Unit(s)/Min (6 mL/Hr) IV Continuous <Continuous>    No Known Allergies    ICU Vital Signs Last 24 Hrs  T(F): 98 (21 Jul 2024 04:00), Max: 99 (20 Jul 2024 08:00)  HR: 137 (21 Jul 2024 04:30) (100 - 137)  ABP: 112/54 (21 Jul 2024 04:30) (78/42 - 129/67)  ABP(mean): 78 (21 Jul 2024 04:30) (54 - 94)  RR: 29 (21 Jul 2024 04:30) (18 - 32)  SpO2: 100% (21 Jul 2024 04:30) (97% - 100%)    Physical Exam:   GENERAL: NAD, lying in bed, NG tube in place, sleepy but arousable  HEAD:  Atraumatic, Normocephalic  EYES: EOMI, PERRLA, conjunctiva and sclera clear  CHEST/LUNG: Breath sounds bilaterally. Unlabored respirations, slight wheezes L sided  HEART: Iregular rhythm, no longer tachycardic  ABDOMEN: Bowel sounds present; Soft, Nontender, Slight distension  EXTREMITIES: Bilateral edema of upper and lower extremities, nonpainful to touch, cool extremities distally, cyanotic pointer finger right hand, cyanotic distal L toes, similar to yesterday, slightly more cyanotic right middle finger  NERVOUS SYSTEM: Speech clear, no focal deficits    I&O's Summary    19 Jul 2024 07:01  -  20 Jul 2024 07:00  --------------------------------------------------------  IN: 2987.1 mL / OUT: 1985 mL / NET: 1002.1 mL    20 Jul 2024 07:01  -  21 Jul 2024 04:54  --------------------------------------------------------  IN: 2815.1 mL / OUT: 992 mL / NET: 1823.1 mL      LABS:                        8.1    14.13 )-----------( 103      ( 21 Jul 2024 00:10 )             24.9     07-21    147<H>  |  120<H>  |  52<H>  ----------------------------<  163<H>  3.6   |  14<L>  |  1.40<H>    Ca    7.8<L>      21 Jul 2024 00:10  Phos  4.2     07-21  Mg     1.7     07-21    TPro  4.2<L>  /  Alb  2.1<L>  /  TBili  0.8  /  DBili  x   /  AST  53<H>  /  ALT  38  /  AlkPhos  184<H>  07-21    PT/INR - ( 21 Jul 2024 00:10 )   PT: 11.5 sec;   INR: 1.10 ratio         PTT - ( 21 Jul 2024 00:10 )  PTT:27.1 sec  ABG - ( 21 Jul 2024 00:00 )  pH, Arterial: 7.33  pH, Blood: x     /  pCO2: 30    /  pO2: 136   / HCO3: 16    / Base Excess: -9.2  /  SaO2: 100.0     Urinalysis Basic - ( 21 Jul 2024 00:10 )    Color: x / Appearance: x / SG: x / pH: x  Gluc: 163 mg/dL / Ketone: x  / Bili: x / Urobili: x   Blood: x / Protein: x / Nitrite: x   Leuk Esterase: x / RBC: x / WBC x   Sq Epi: x / Non Sq Epi: x / Bacteria: x      CARDIAC MARKERS ( 21 Jul 2024 00:10 )  x     / x     / 228 U/L / x     / x      CARDIAC MARKERS ( 20 Jul 2024 00:47 )  x     / x     / 137 U/L / x     / x          CAPILLARY BLOOD GLUCOSE      POCT Blood Glucose.: 167 mg/dL (20 Jul 2024 17:08)  POCT Blood Glucose.: 175 mg/dL (20 Jul 2024 12:17)  POCT Blood Glucose.: 107 mg/dL (20 Jul 2024 05:38) Jodie Okeefe MD PGY-1  ---------------------------------------------------------------------------------------------  Patient is a 78y old  Male who presents with a chief complaint of hypotension (20 Jul 2024 22:17)    HPI:  77 yo male with PMH of CAD s/p PCI x3 with most recent stent 6/12/24 on Plavix, chronic Afib on eliquis, orthostatic hypotension on midodrine, PAD, 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 Patient states since discharge on this past Saturday, he continued to feel ongoing generalized weakness and dizziness with positional changes despite compliance with home midodrine 15mg TID and holding torsemide as instructed. Admits to poor PO intake of fluids/solute due to ongoing issues with poor appetite and nausea with meals which is not new. Denies any fever, chills, chest pain, SOB, cough, abd pain, dysuria nor urinary frequency. Has chronic diarrhea that is unchanged from his baseline.     In the ED, vitals notable for SBP 92-11s. Labs notable for elevated BUN/Cr 31/1.59 (from 30/1.18 on day of discharge 6/29/24). CXR with clear lungs. Abd US with innumerable intrahepatic echogenic masses consistent with known metastatic neuroendocrine tumor. Admitted to medicine for symptomatic hypotension and RAZIA. (02 Jul 2024 18:34)    SUBJECTIVE / OVERNIGHT EVENTS:  Overnight, patient was AOx1. He has continued on vaso. He received 40 mg lasix with -1.9L/24 hours. There were no episodes of melena overnight with Hgb 8.7.    Today, patient examined at bedside. He denies pain or SOB. He has no complaints.    Gtt vaso 0.04    Ivory in place    Vitals: HR 67, SpO2 100% RA, /48 (MAPs 70s), RR 15, temp 36.4    MEDICATIONS  (STANDING):  aMIOdarone    Tablet 400 milliGRAM(s) Oral every 8 hours  aMIOdarone    Tablet 200 milliGRAM(s) Oral daily  aMIOdarone    Tablet   Oral   atorvastatin 80 milliGRAM(s) Oral at bedtime  chlorhexidine 0.12% Liquid 15 milliLiter(s) Oral Mucosa every 12 hours  chlorhexidine 2% Cloths 1 Application(s) Topical <User Schedule>  dexMEDEtomidine Infusion 0.1 MICROgram(s)/kG/Hr (2.47 mL/Hr) IV Continuous <Continuous>  doxazosin 2 milliGRAM(s) Oral at bedtime  folic acid Injectable 1 milliGRAM(s) IV Push daily  insulin lispro (ADMELOG) corrective regimen sliding scale   SubCutaneous every 6 hours  meropenem  IVPB 1000 milliGRAM(s) IV Intermittent every 8 hours  meropenem  IVPB      midodrine 30 milliGRAM(s) Oral every 8 hours  mupirocin 2% Nasal 1 Application(s) Both Nostrils two times a day  norepinephrine Infusion 0.05 MICROgram(s)/kG/Min (9.25 mL/Hr) IV Continuous <Continuous>  pantoprazole Infusion 8 mG/Hr (10 mL/Hr) IV Continuous <Continuous>  phenylephrine    Infusion 0.1 MICROgram(s)/kG/Min (1.85 mL/Hr) IV Continuous <Continuous>  propofol Infusion 20 MICROgram(s)/kG/Min (11.8 mL/Hr) IV Continuous <Continuous>  sodium bicarbonate 1300 milliGRAM(s) Oral three times a day  thiamine Injectable 100 milliGRAM(s) IV Push daily  vancomycin    Solution 125 milliGRAM(s) Oral every 6 hours  vasopressin Infusion 0.04 Unit(s)/Min (6 mL/Hr) IV Continuous <Continuous>    No Known Allergies    ICU Vital Signs Last 24 Hrs  T(F): 98 (21 Jul 2024 04:00), Max: 99 (20 Jul 2024 08:00)  HR: 137 (21 Jul 2024 04:30) (100 - 137)  ABP: 112/54 (21 Jul 2024 04:30) (78/42 - 129/67)  ABP(mean): 78 (21 Jul 2024 04:30) (54 - 94)  RR: 29 (21 Jul 2024 04:30) (18 - 32)  SpO2: 100% (21 Jul 2024 04:30) (97% - 100%)    Physical Exam:   GENERAL: NAD, lying in bed, NG tube in place, sleepy but arousable  HEAD:  Atraumatic, Normocephalic  EYES: EOMI, PERRLA, conjunctiva and sclera clear  CHEST/LUNG: Breath sounds bilaterally. Unlabored respirations  HEART: Irregular rhythm, no longer tachycardic  ABDOMEN: Bowel sounds present; Soft, Nontender, Slight distension  EXTREMITIES: Bilateral edema of upper and lower extremities, nonpainful to touch, cool extremities distally, cyanotic pointer and middle finger right hand, cyanotic distal L toes, similar to yesterday, right distal foot slightly more cyanotic  NERVOUS SYSTEM: Speech clear, no focal deficits    I&O's Summary    19 Jul 2024 07:01  -  20 Jul 2024 07:00  --------------------------------------------------------  IN: 2987.1 mL / OUT: 1985 mL / NET: 1002.1 mL    20 Jul 2024 07:01  -  21 Jul 2024 04:54  --------------------------------------------------------  IN: 2815.1 mL / OUT: 992 mL / NET: 1823.1 mL      LABS:                        8.1    14.13 )-----------( 103      ( 21 Jul 2024 00:10 )             24.9     07-21    147<H>  |  120<H>  |  52<H>  ----------------------------<  163<H>  3.6   |  14<L>  |  1.40<H>    Ca    7.8<L>      21 Jul 2024 00:10  Phos  4.2     07-21  Mg     1.7     07-21    TPro  4.2<L>  /  Alb  2.1<L>  /  TBili  0.8  /  DBili  x   /  AST  53<H>  /  ALT  38  /  AlkPhos  184<H>  07-21    PT/INR - ( 21 Jul 2024 00:10 )   PT: 11.5 sec;   INR: 1.10 ratio         PTT - ( 21 Jul 2024 00:10 )  PTT:27.1 sec  ABG - ( 21 Jul 2024 00:00 )  pH, Arterial: 7.33  pH, Blood: x     /  pCO2: 30    /  pO2: 136   / HCO3: 16    / Base Excess: -9.2  /  SaO2: 100.0     Urinalysis Basic - ( 21 Jul 2024 00:10 )    Color: x / Appearance: x / SG: x / pH: x  Gluc: 163 mg/dL / Ketone: x  / Bili: x / Urobili: x   Blood: x / Protein: x / Nitrite: x   Leuk Esterase: x / RBC: x / WBC x   Sq Epi: x / Non Sq Epi: x / Bacteria: x      CARDIAC MARKERS ( 21 Jul 2024 00:10 )  x     / x     / 228 U/L / x     / x      CARDIAC MARKERS ( 20 Jul 2024 00:47 )  x     / x     / 137 U/L / x     / x          CAPILLARY BLOOD GLUCOSE      POCT Blood Glucose.: 167 mg/dL (20 Jul 2024 17:08)  POCT Blood Glucose.: 175 mg/dL (20 Jul 2024 12:17)  POCT Blood Glucose.: 107 mg/dL (20 Jul 2024 05:38) Jodie Okeefe MD PGY-1  ---------------------------------------------------------------------------------------------  Patient is a 78y old  Male who presents with a chief complaint of hypotension (20 Jul 2024 22:17)    HPI:  79 yo male with PMH of CAD s/p PCI x3 with most recent stent 6/12/24 on Plavix, chronic Afib on eliquis, orthostatic hypotension on midodrine, PAD, 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 Patient states since discharge on this past Saturday, he continued to feel ongoing generalized weakness and dizziness with positional changes despite compliance with home midodrine 15mg TID and holding torsemide as instructed. Admits to poor PO intake of fluids/solute due to ongoing issues with poor appetite and nausea with meals which is not new. Denies any fever, chills, chest pain, SOB, cough, abd pain, dysuria nor urinary frequency. Has chronic diarrhea that is unchanged from his baseline.     In the ED, vitals notable for SBP 92-11s. Labs notable for elevated BUN/Cr 31/1.59 (from 30/1.18 on day of discharge 6/29/24). CXR with clear lungs. Abd US with innumerable intrahepatic echogenic masses consistent with known metastatic neuroendocrine tumor. Admitted to medicine for symptomatic hypotension and RAZIA. (02 Jul 2024 18:34)    SUBJECTIVE / OVERNIGHT EVENTS:  Overnight, patient was AOx1. He has continued on vaso. He received 40 mg lasix with -1.9L/24 hours. There were no episodes of melena overnight with Hgb 8.7.    Today, patient examined at bedside. He denies pain or SOB. He has no complaints.    Gtt vaso 0.04    Ivory in place    Vitals: HR 67, SpO2 100% RA, /48 (MAPs 70s), RR 15, temp 36.4    MEDICATIONS  (STANDING):  aMIOdarone    Tablet 400 milliGRAM(s) Oral every 8 hours  aMIOdarone    Tablet 200 milliGRAM(s) Oral daily  aMIOdarone    Tablet   Oral   atorvastatin 80 milliGRAM(s) Oral at bedtime  chlorhexidine 0.12% Liquid 15 milliLiter(s) Oral Mucosa every 12 hours  chlorhexidine 2% Cloths 1 Application(s) Topical <User Schedule>  dexMEDEtomidine Infusion 0.1 MICROgram(s)/kG/Hr (2.47 mL/Hr) IV Continuous <Continuous>  doxazosin 2 milliGRAM(s) Oral at bedtime  folic acid Injectable 1 milliGRAM(s) IV Push daily  insulin lispro (ADMELOG) corrective regimen sliding scale   SubCutaneous every 6 hours  meropenem  IVPB 1000 milliGRAM(s) IV Intermittent every 8 hours  meropenem  IVPB      midodrine 30 milliGRAM(s) Oral every 8 hours  mupirocin 2% Nasal 1 Application(s) Both Nostrils two times a day  norepinephrine Infusion 0.05 MICROgram(s)/kG/Min (9.25 mL/Hr) IV Continuous <Continuous>  pantoprazole Infusion 8 mG/Hr (10 mL/Hr) IV Continuous <Continuous>  phenylephrine    Infusion 0.1 MICROgram(s)/kG/Min (1.85 mL/Hr) IV Continuous <Continuous>  propofol Infusion 20 MICROgram(s)/kG/Min (11.8 mL/Hr) IV Continuous <Continuous>  sodium bicarbonate 1300 milliGRAM(s) Oral three times a day  thiamine Injectable 100 milliGRAM(s) IV Push daily  vancomycin    Solution 125 milliGRAM(s) Oral every 6 hours  vasopressin Infusion 0.04 Unit(s)/Min (6 mL/Hr) IV Continuous <Continuous>    No Known Allergies    ICU Vital Signs Last 24 Hrs  T(F): 98 (21 Jul 2024 04:00), Max: 99 (20 Jul 2024 08:00)  HR: 137 (21 Jul 2024 04:30) (100 - 137)  ABP: 112/54 (21 Jul 2024 04:30) (78/42 - 129/67)  ABP(mean): 78 (21 Jul 2024 04:30) (54 - 94)  RR: 29 (21 Jul 2024 04:30) (18 - 32)  SpO2: 100% (21 Jul 2024 04:30) (97% - 100%)    Physical Exam:   GENERAL: NAD, lying in bed, NG tube in place, awake  HEAD:  Atraumatic, Normocephalic  EYES: EOMI, PERRLA, conjunctiva and sclera clear  CHEST/LUNG: Breath sounds bilaterally. Unlabored respirations  HEART: Irregular rhythm, no longer tachycardic  ABDOMEN: Bowel sounds present; Soft, Nontender, Slight distension  EXTREMITIES: Bilateral edema of upper and lower extremities, nonpainful to touch, cool extremities distally, cyanotic pointer and middle finger right hand, cyanotic distal L toes, similar to yesterday, right distal foot slightly more cyanotic  NERVOUS SYSTEM: Speech clear, no focal deficits, AOx1-2 (self, knows hospital but says [facility])    I&O's Summary    19 Jul 2024 07:01  -  20 Jul 2024 07:00  --------------------------------------------------------  IN: 2987.1 mL / OUT: 1985 mL / NET: 1002.1 mL    20 Jul 2024 07:01  -  21 Jul 2024 04:54  --------------------------------------------------------  IN: 2815.1 mL / OUT: 992 mL / NET: 1823.1 mL      LABS:                        8.1    14.13 )-----------( 103      ( 21 Jul 2024 00:10 )             24.9     07-21    147<H>  |  120<H>  |  52<H>  ----------------------------<  163<H>  3.6   |  14<L>  |  1.40<H>    Ca    7.8<L>      21 Jul 2024 00:10  Phos  4.2     07-21  Mg     1.7     07-21    TPro  4.2<L>  /  Alb  2.1<L>  /  TBili  0.8  /  DBili  x   /  AST  53<H>  /  ALT  38  /  AlkPhos  184<H>  07-21    PT/INR - ( 21 Jul 2024 00:10 )   PT: 11.5 sec;   INR: 1.10 ratio         PTT - ( 21 Jul 2024 00:10 )  PTT:27.1 sec  ABG - ( 21 Jul 2024 00:00 )  pH, Arterial: 7.33  pH, Blood: x     /  pCO2: 30    /  pO2: 136   / HCO3: 16    / Base Excess: -9.2  /  SaO2: 100.0     Urinalysis Basic - ( 21 Jul 2024 00:10 )    Color: x / Appearance: x / SG: x / pH: x  Gluc: 163 mg/dL / Ketone: x  / Bili: x / Urobili: x   Blood: x / Protein: x / Nitrite: x   Leuk Esterase: x / RBC: x / WBC x   Sq Epi: x / Non Sq Epi: x / Bacteria: x      CARDIAC MARKERS ( 21 Jul 2024 00:10 )  x     / x     / 228 U/L / x     / x      CARDIAC MARKERS ( 20 Jul 2024 00:47 )  x     / x     / 137 U/L / x     / x          CAPILLARY BLOOD GLUCOSE      POCT Blood Glucose.: 167 mg/dL (20 Jul 2024 17:08)  POCT Blood Glucose.: 175 mg/dL (20 Jul 2024 12:17)  POCT Blood Glucose.: 107 mg/dL (20 Jul 2024 05:38) Jodie Okeefe MD PGY-1  ---------------------------------------------------------------------------------------------  Patient is a 78y old  Male who presents with a chief complaint of hypotension (20 Jul 2024 22:17)    HPI:  79 yo male with PMH of CAD s/p PCI x3 with most recent stent 6/12/24 on Plavix, chronic Afib on eliquis, orthostatic hypotension on midodrine, PAD, 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 Patient states since discharge on this past Saturday, he continued to feel ongoing generalized weakness and dizziness with positional changes despite compliance with home midodrine 15mg TID and holding torsemide as instructed. Admits to poor PO intake of fluids/solute due to ongoing issues with poor appetite and nausea with meals which is not new. Denies any fever, chills, chest pain, SOB, cough, abd pain, dysuria nor urinary frequency. Has chronic diarrhea that is unchanged from his baseline.     In the ED, vitals notable for SBP 92-11s. Labs notable for elevated BUN/Cr 31/1.59 (from 30/1.18 on day of discharge 6/29/24). CXR with clear lungs. Abd US with innumerable intrahepatic echogenic masses consistent with known metastatic neuroendocrine tumor. Admitted to medicine for symptomatic hypotension and RAZIA. (02 Jul 2024 18:34)    SUBJECTIVE / OVERNIGHT EVENTS:  Overnight, patient was AOx1. He has continued on vaso. He received 40 mg lasix with -1.9L/24 hours. There were no episodes of melena overnight with Hgb 8.7, but there were two small red stools per nursing.    Today, patient examined at bedside. He denies pain or SOB. He has no complaints.    Gtt vaso 0.04    Ivory in place    Vitals: HR 67, SpO2 100% RA, /48 (MAPs 70s), RR 15, temp 36.4    MEDICATIONS  (STANDING):  aMIOdarone    Tablet 400 milliGRAM(s) Oral every 8 hours  aMIOdarone    Tablet 200 milliGRAM(s) Oral daily  aMIOdarone    Tablet   Oral   atorvastatin 80 milliGRAM(s) Oral at bedtime  chlorhexidine 0.12% Liquid 15 milliLiter(s) Oral Mucosa every 12 hours  chlorhexidine 2% Cloths 1 Application(s) Topical <User Schedule>  dexMEDEtomidine Infusion 0.1 MICROgram(s)/kG/Hr (2.47 mL/Hr) IV Continuous <Continuous>  doxazosin 2 milliGRAM(s) Oral at bedtime  folic acid Injectable 1 milliGRAM(s) IV Push daily  insulin lispro (ADMELOG) corrective regimen sliding scale   SubCutaneous every 6 hours  meropenem  IVPB 1000 milliGRAM(s) IV Intermittent every 8 hours  meropenem  IVPB      midodrine 30 milliGRAM(s) Oral every 8 hours  mupirocin 2% Nasal 1 Application(s) Both Nostrils two times a day  norepinephrine Infusion 0.05 MICROgram(s)/kG/Min (9.25 mL/Hr) IV Continuous <Continuous>  pantoprazole Infusion 8 mG/Hr (10 mL/Hr) IV Continuous <Continuous>  phenylephrine    Infusion 0.1 MICROgram(s)/kG/Min (1.85 mL/Hr) IV Continuous <Continuous>  propofol Infusion 20 MICROgram(s)/kG/Min (11.8 mL/Hr) IV Continuous <Continuous>  sodium bicarbonate 1300 milliGRAM(s) Oral three times a day  thiamine Injectable 100 milliGRAM(s) IV Push daily  vancomycin    Solution 125 milliGRAM(s) Oral every 6 hours  vasopressin Infusion 0.04 Unit(s)/Min (6 mL/Hr) IV Continuous <Continuous>    No Known Allergies    ICU Vital Signs Last 24 Hrs  T(F): 98 (21 Jul 2024 04:00), Max: 99 (20 Jul 2024 08:00)  HR: 137 (21 Jul 2024 04:30) (100 - 137)  ABP: 112/54 (21 Jul 2024 04:30) (78/42 - 129/67)  ABP(mean): 78 (21 Jul 2024 04:30) (54 - 94)  RR: 29 (21 Jul 2024 04:30) (18 - 32)  SpO2: 100% (21 Jul 2024 04:30) (97% - 100%)    Physical Exam:   GENERAL: NAD, lying in bed, NG tube in place, awake  HEAD:  Atraumatic, Normocephalic  EYES: EOMI, PERRLA, conjunctiva and sclera clear  CHEST/LUNG: Breath sounds bilaterally. Unlabored respirations  HEART: Irregular rhythm, no longer tachycardic  ABDOMEN: Bowel sounds present; Soft, Nontender, Slight distension  EXTREMITIES: Bilateral edema of upper and lower extremities, nonpainful to touch, cool extremities distally, cyanotic pointer and middle finger right hand, cyanotic distal L toes, similar to yesterday, right distal foot slightly more cyanotic  NERVOUS SYSTEM: Speech clear, no focal deficits, AOx1-2 (self, knows hospital but says [facility])    I&O's Summary    19 Jul 2024 07:01  -  20 Jul 2024 07:00  --------------------------------------------------------  IN: 2987.1 mL / OUT: 1985 mL / NET: 1002.1 mL    20 Jul 2024 07:01  -  21 Jul 2024 04:54  --------------------------------------------------------  IN: 2815.1 mL / OUT: 992 mL / NET: 1823.1 mL      LABS:                        8.1    14.13 )-----------( 103      ( 21 Jul 2024 00:10 )             24.9     07-21    147<H>  |  120<H>  |  52<H>  ----------------------------<  163<H>  3.6   |  14<L>  |  1.40<H>    Ca    7.8<L>      21 Jul 2024 00:10  Phos  4.2     07-21  Mg     1.7     07-21    TPro  4.2<L>  /  Alb  2.1<L>  /  TBili  0.8  /  DBili  x   /  AST  53<H>  /  ALT  38  /  AlkPhos  184<H>  07-21    PT/INR - ( 21 Jul 2024 00:10 )   PT: 11.5 sec;   INR: 1.10 ratio         PTT - ( 21 Jul 2024 00:10 )  PTT:27.1 sec  ABG - ( 21 Jul 2024 00:00 )  pH, Arterial: 7.33  pH, Blood: x     /  pCO2: 30    /  pO2: 136   / HCO3: 16    / Base Excess: -9.2  /  SaO2: 100.0     Urinalysis Basic - ( 21 Jul 2024 00:10 )    Color: x / Appearance: x / SG: x / pH: x  Gluc: 163 mg/dL / Ketone: x  / Bili: x / Urobili: x   Blood: x / Protein: x / Nitrite: x   Leuk Esterase: x / RBC: x / WBC x   Sq Epi: x / Non Sq Epi: x / Bacteria: x      CARDIAC MARKERS ( 21 Jul 2024 00:10 )  x     / x     / 228 U/L / x     / x      CARDIAC MARKERS ( 20 Jul 2024 00:47 )  x     / x     / 137 U/L / x     / x          CAPILLARY BLOOD GLUCOSE      POCT Blood Glucose.: 167 mg/dL (20 Jul 2024 17:08)  POCT Blood Glucose.: 175 mg/dL (20 Jul 2024 12:17)  POCT Blood Glucose.: 107 mg/dL (20 Jul 2024 05:38)

## 2024-07-25 NOTE — PROGRESS NOTE ADULT - SUBJECTIVE AND OBJECTIVE BOX
Patient is a 78y old  Male who presents with a chief complaint of hypotension (25 Jul 2024 05:16)    Patient seen and examined.  Appears comfortable  No acute overnight events    MEDICATIONS  (STANDING):  aMIOdarone    Tablet   Oral   aMIOdarone    Tablet 200 milliGRAM(s) Oral daily  atorvastatin 80 milliGRAM(s) Oral at bedtime  buMETAnide Injectable 1 milliGRAM(s) IV Push two times a day  chlorhexidine 2% Cloths 1 Application(s) Topical <User Schedule>  chlorhexidine 4% Liquid 1 Application(s) Topical <User Schedule>  digoxin     Tablet 125 MICROGram(s) Oral daily  doxazosin 2 milliGRAM(s) Oral at bedtime  droxidopa 100 milliGRAM(s) Oral every 8 hours  folic acid Injectable 1 milliGRAM(s) IV Push daily  hydrocortisone sodium succinate Injectable 50 milliGRAM(s) IV Push every 6 hours  insulin lispro (ADMELOG) corrective regimen sliding scale   SubCutaneous every 6 hours  metolazone 5 milliGRAM(s) Oral daily  midodrine 30 milliGRAM(s) Oral every 8 hours  pantoprazole  Injectable 40 milliGRAM(s) IV Push every 12 hours  sodium bicarbonate 650 milliGRAM(s) Oral three times a day  thiamine Injectable 100 milliGRAM(s) IV Push daily  vancomycin    Solution 125 milliGRAM(s) Oral every 12 hours  vasopressin Infusion 0.04 Unit(s)/Min (6 mL/Hr) IV Continuous <Continuous>    MEDICATIONS  (PRN):  sodium chloride 0.9% lock flush 10 milliLiter(s) IV Push every 1 hour PRN Pre/post blood products, medications, blood draw, and to maintain line patency      Vital Signs Last 24 Hrs  T(C): 36.6 (25 Jul 2024 12:00), Max: 36.9 (24 Jul 2024 20:00)  T(F): 97.9 (25 Jul 2024 12:00), Max: 98.4 (24 Jul 2024 20:00)  HR: 65 (25 Jul 2024 12:45) (62 - 114)  BP: --  BP(mean): --  RR: 17 (25 Jul 2024 12:45) (12 - 31)  SpO2: 100% (25 Jul 2024 12:45) (87% - 100%)    Parameters below as of 24 Jul 2024 20:00  Patient On (Oxygen Delivery Method): room air        PE  Awake, alert  Anicteric, MMM  RRR  CTAB  Abd soft, NT, ND  No c/c/e  No rash grossly  FROM                          9.1    9.85  )-----------( 98       ( 25 Jul 2024 10:05 )             27.2       07-25    148<H>  |  118<H>  |  45<H>  ----------------------------<  176<H>  3.5   |  19<L>  |  1.06    Ca    7.9<L>      25 Jul 2024 10:05  Phos  2.9     07-25  Mg     1.9     07-25    TPro  4.2<L>  /  Alb  2.2<L>  /  TBili  0.5  /  DBili  x   /  AST  75<H>  /  ALT  30  /  AlkPhos  174<H>  07-24

## 2024-07-26 NOTE — SWALLOW BEDSIDE ASSESSMENT ADULT - SPECIFY REASON(S)
to subjectively assess swallow safety/function; r/o dysphagia. Failed dysphagia screen 7/15 on 5mL thin liquids as evidenced by coughing.
to subjectively assess swallow safety/function; r/o dysphagia.

## 2024-07-26 NOTE — SWALLOW BEDSIDE ASSESSMENT ADULT - H & P REVIEW
77 yo male with metastatic neuroendocrine pancreatic cancer (tx on hold) and PMH of CAD s/p PCI x3 with most recent stent 6/12/24 on Plavix and eliquis , chronic Afib on eliquis, orthostatic hypotension on midodrine, PAD , recent admission for dx cath on 6/24/24 presented from PCP's office for hypotension despite taking AM midodrine dose on 7/2, admitted to medicine for symptomatic hypotension and RAZIA. Per chart, on 7/8 PM, pt was noted to have acute onset of melena x5 and coffee ground emesis x2-3. RRT was called on 7/9 AM for hypotension, hgb dropped from 9.2 to 7.4 (admission baseline 10-11), FOBT +, pt was started on PPI IV and transfused 1 unit of pRBC. GI was consulted and underwent EGD on 7/9 afternoon. MICU was consulted for uncontrolled bleeding during EGD. During EGD, pt became hypotensive and was given phenylephrine, had A-fib with RVR s/p amiodarone. Now s/p IR GDA embolization, admitted to MICU for further moniring i.s.o hemorrhagic shock 2/2 GI bleed. Repeat EGD 7/12 showed grade D esophagitis, organized clots with extensive duodenal ulcers (some with pigmented spots) w/o active bleeding, treated with purastat. Hgb stable since repeat scope Hospital course c/b c.diff, and patient has been placed on vancomycin. Pt extubated 7/15./yes
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 Per chart, on 7/8 PM, pt was noted to have acute onset of melena x5 and coffee ground emesis x2-3. RRT was called on 7/9 AM for hypotension, hgb dropped from 9.2 to 7.4 (admission baseline 10-11), FOBT +, pt was started on PPI IV and transfused 1 unit of pRBC. GI was consulted and underwent EGD on 7/9 afternoon. MICU was consulted for uncontrolled bleeding during EGD. During EGD, pt became hypotensive and was given phenylephrine, had A-fib with RVR s/p amiodarone. Now s/p IR GDA embolization, admitted to MICU for further moniring i.s.o hemorrhagic shock 2/2 GI bleed. On repeat EGD, oozing but no active bleeding was noted. Hospital course c/b c.diff, and patient has been placed on vancomycin. Patient had melanotic stool 7/18 with Hgb drop 10.1 to 7.1 and received 2 units pRBC and 1 unit platelets. Patient was reintubated and underwent EGD 7/18 afternoon. GI found duodenal bleed. Patient now s/p 3x clips and epi. Since 7/18 patient has continued to have melanotic stools with hemoglobin drops concerning for slow rebleeding. Extubated 7/22./yes

## 2024-07-26 NOTE — SWALLOW BEDSIDE ASSESSMENT ADULT - COMMENTS
7/15 GI F/U NOTE; No further GI endoscopic evaluation at this time, c/w iv ppi bid x 8 weeks, check hp serology - if positive would treat. Diet as tolerated. GI to s/o.    7/14 CXR IMPRESSION: Small right pleural effusion. Interval insertion of left IJ central venous catheter with tip in the SVC. Remainder of lines and tubes above.    SWALLOW HISTORY: No reports in SCM or in PACS prior to this admission.
*Pt actively being followed by this service. S/p FEES 7/17 w/ recommendations for NPO, with non-oral nutrition/hydration/medications.

## 2024-07-26 NOTE — PROGRESS NOTE ADULT - ATTENDING COMMENTS
78M w/ CAD (recent stent 6/12/24), Afib, metastatic neuroendocrine pancreatic tumor. Admitted w/ hypotension, course complicated by ABLA and GIB. Undergone EGD #1 on 7/9 which showed uncontrolled bleeding, after which pt developed Afib w/ RVR and hemorrhagic shock, s/p IR for GDA embolization. EGD #2 on 7/12 showed oozing but no active bleeding. Course further complicated by Cdiff colitis. Had another episode of melena and ABLA on 7/18 s/p EGD #3 showed bleeding vessel s/p clipx3 and epi. Pt remains intubated post-procedure and has since been extubated. Continues to have intermittent melanotic stools, and in the last 36 hours has required 2 pRBCs, 1 plt and 1 FFP for continued melena and ABLA. Remains in shock state as well.     Awake and alert. Remains in shock state, etiology initially hemorrhagic now suspect more vasoplegic/distributive, unable to wean off vasopressin, appears to have wide PP, so maintain SBP >100; continue midodrine and droxidopa, stress dose steroids; afib w/ RVR resolved s/p IV digoxin load, continue digoxin and amiodarone; attempted asa challenge but then bled again, discuss w/ GI when can restart asa and risks vs benefits given that pt had recent NURIA placed in 6/2024. Satting well on RA. S/P 10 days of vancomycin PO for C diff; completed meropenam for Klebsiella pneumonia; monitor off abx, if needs abx will need ppx dose of PO vanco given recent c diff. RAZIA likely prerenal ATN, stable, non-oliguric; continue diuresis as pt is grossly volume overloaded but needs aggressive repletion of electrolytes; continue free water for hyperNa; tavarez placed by urology - no plan to remove for now. NGT in place as pt has failed several swallow evaluations prior, increase TF rate today; swallow evaluation; surgery holding off any intervention for now in setting of recent asa, f/u if continues to bleed several days after asa; continue PPI q12; s/p EGD x3 and GDA embolization for multiple GIB; do not believe octreotide will be helpful here as pt is not having diarrhea. ABLA due to GIB, received additional pRBC and now hgb stable; count is 16:4:5; CBC q12 today. FS w/ ISS coverage. Prognosis guarded. Full code.

## 2024-07-26 NOTE — PROGRESS NOTE ADULT - ASSESSMENT
77 yo male with PMH of CAD s/p PCI x3 with most recent stent 6/12/24 on Plavix, chronic Afib on eliquis, orthostatic hypotension on midodrine, PAD, neuroendocrine tumor with RT currently on hold, recent admission for dx cath on 6/24/24 who presented for hypotension, admitted for GIB and hemorrhagic shock. Found to be bleeding from duodenum. Transferred to MICU, on pressors.     1. Pancreatic NET- metastatic   -- was on lanreotide then had POD in liver and started on peptide receptor radiotherapy (PRRT)- typically given every 8 weeks for 4 doses. He received one dose on 10/20/2023 and planned to get his 2nd dose at the end of December however his course was complicated by multiple hospitalizations that were noncancer related (decompensated heart failure).   -- 5/28/24 PET Dotatate (compared to 9/2023): several new somatostatin avid osseous lesions, some faintly sclerotic, suspicious for mets. Increased upper RP nodes, probably metastatic. Decreased intensely tracer avid right supradiaphragmatic and upper abdominal nodes, suspicious for metastasis. Redemonstrated intensely somatostatin avid bilobar hepatic lesions, consistent with metastases again morphologically difficult to delineate.   -- CT reviewed by [facility]: SINCE MAY 8 2024 INCREASED HEPATIC METASTASES, NEWLY SEEN PRIMARY TUMOR IN THE PANCREAS, and active bleeding within the duodenum with associated intraluminal hematoma.   -- chromogranin level is elevated at 2058, but no prior level at [facility] [place]   -- f[place] with Dr. Sophia Prasad at [facility] [place], no plan for chemotherapy inpatient, if clinically improves/stabilizes then can be considered for treatment outpatient  --will recommend starting octreotide 250mcg TID    2. Duodenal bleed, Hemorrhagic shock  - Remains in MICU, s/p extubation 7/22. NG tube in place.  - CT w/ bleed in duodenum. GI called, EGD 7/9 -> S/p  IR embolization 7/9, intubated in MICU -> s/p extubation 7/15 -> intubated 7/18  - s/p multiple transfusions, fibrinogen low would recommend Cry for < 100, but coags have improved as are essentially normal now so unlikely, probably was related to liver dysfunction.   - s/p repeat EGD 7/12 showing duodenal lesions w/clots and oozing, no clear targets for intervention and no active bleeding, purastat applied to all areas of oozing.   - S/p EGD 7/18: duodenal ulcer with active oozing, ulcer with visible vessel. Bleeding treated.   - S/p 2 units pRBC 7/23, platelets and plasma  - Per GI, high risk for rebleed, will continue PPI.   - Repeat CT AP w/ No evidence of GI bleed.  Interval endoscopic versus surgical intervention with metallic streak artifact suggestive of surgical clips in the region of the proximal one third portions of the duodenum. Evaluation of the previously seen hematoma is limited secondary to this artifact. Moderate bilateral pleural effusions and associated compressive atelectasis, right greater than left, overall slightly increased from previous CT. Anasarca.  - Per IR, In the absences of any active extravasation on CTA there's no place to target for an embolization  - Surgery monitoring for further bleeding. If significant recurrence of UGIB, may consider emergent open duodenotomy   - MICU managing antibiotics, on meropenem given fevers. Patient received ASA on 7/23, plan to have surgery re-evaluate in a few days  - will recommend octreotide 250 mcg TID    3. C. diff diarrhea  - on isolation  - c/w abx    Christen Rosales NP  Hematology/ Oncology  [facility]  [phone] (office)  [phone] (alt office)  Evenings and weekends please call MD on call or office

## 2024-07-26 NOTE — PROGRESS NOTE ADULT - ASSESSMENT
Assessment	  Assessment: 79 yo male with metastatic neuroendocrine pancreatic cancer (tx on hold) and PMH of CAD s/p PCI x3 with most recent stent 6/12/24 on Plavix and eliquis , chronic Afib on eliquis, orthostatic hypotension on midodrine, PAD , recent admission for dx cath on 6/24/24 presented from PCP's office for hypotension despite taking AM midodrine dose on 7/2, admitted to medicine for symptomatic hypotension and RAZIA. Per chart, on 7/8 PM, pt was noted to have acute onset of melena x5 and coffee ground emesis x2-3. RRT was called on 7/9 AM for hypotension, hgb dropped from 9.2 to 7.4 (admission baseline 10-11), FOBT +, pt was started on PPI IV and transfused 1 unit of pRBC. GI was consulted and underwent EGD on 7/9 afternoon. MICU was consulted for uncontrolled bleeding during EGD. During EGD, pt became hypotensive and was given phenylephrine, had A-fib with RVR s/p amiodarone. Now s/p IR GDA embolization, admitted to MICU for further moniring i.s.o hemorrhagic shock 2/2 GI bleed. On repeat EGD, oozing but no active bleeding was noted. Hospital course c/b c.diff, and patient has been placed on vancomycin. Patient had melanotic stool 7/18 with Hgb drop 10.1 to 7.1 and received 2 units pRBC and 1 unit platelets. Patient was reintubated and underwent EGD 7/18 afternoon. GI found duodenal bleed. Patient now s/p 3x clips and epi. Since 7/18 patient has continued to have melanotic stools with hemoglobin drops concerning for slow rebleeding.     - Total transfusions: 16 pRBC, 4 platelet, 5 FFP (as of 7/25)    Plan:     NEUROLOGIC  # Baseline AOx3 (waxing/waning)  Course:  - intubated 7/9, extubated 7/15  - reintubated 7/18 for EGD, extubated 7/22  - not sedated  - Currently AOx1-2  Plan:  - Delirium precautions    CARDIOVASCULAR  # hemorrhagic shock (improving)  Course:  - Per spouse, pt's baseline BP is a little bit over 100  - 7/9: RRT called 7/9 for hypotension; 2/2 to Upper GI bleeds, urgently took to EGD, found bleeding ulcer that was not well controlled, underwent IR GDA embolization  - 7/18, patient had large melanotic stool with Hgb drop from 10.1-7.1, patient was restarted on henny, vaso, and levo for reintubation for GI scope; s/p 2pRBC, 1 platelet  - Total transfusions: 16 pRBC, 4 platelet, 5 FFP  Findings:  - AM cortisol 17 (7/5), AM cortisol 37.1 (7/11)  - POCUS ECHO (7/16) showed no cardiogenic shock, no tamponade, low SV indeterminate IVC, irregular B lines but not concerning for pulmonary edema, and some subdiaphragmatic fluid  Plan:  - Continue to monitor CBC  - Transfuse for Hgb <8, (goal given recent stent)  - Increase droxidopa to 200 mg q8  - Try to wean off vaso today; continue to monitor off pressors with goal SBP > 100  - Continue to hold Plavix 75 mg  - Continue midodrine 30mg q8  - Continue hydrocortisone 50mg q6 for refractory shock    #CAD s/p PCI x3, most recent stent 6/12/24, NURIA to pLAD (stable)  Course:  - 7/13 restarted plavix for new stent (6/12/24) per GI, 7/18 held plavix due to bleeding  - 7/22 started low-dose aspirin as safer alternative to plavix per cardiology, but d/c 7/23 as patient bled again  Plan:  - Continue to hold Plavix 75 mg and aspirin 81 mg  - Continue atorvastatin 80 mg daily    #PAD  # A-fib on eliquis (improving)  May have been worsened because of GI bleed  - s/p successful DCCV 10/31   - 7/16 In RVR, 2 doses of amio overnight; Cards recommended resuming home dose sotalol 40 mg PO (qTC wnl), s/p 1 dose sotalol but RVR persisted  - 7/16 Started amio loading  - 7/22 Added digoxin load for persistent HR 130s over past week  - 7/23 Dig level 1.3, patient's HR back in 60s in the afternoon  Plan:  - Hold eliquis   - Continue amio 200 mg daily  - Continue digoxin maintenance dose 125 mcg daily  - Hold home sotalol  - Cardiology following, advises against chemical cardioversion off AC if possible    PULMONARY  #Intubated for airway protection prior to EGD/IR embolization (Resolved)  Course:  - Extubated 7/22, SpO2 100% on RA    RENAL/  #RAZIA (resolved)  - Cr peaked at 3.38 (7/11)  - Currently Cr 1.13 ( 7/24)  Plan:  - Continue to monitor Cr    #ATN i.s.o hypotension  #metabolic acidosis (improving)  Patient was in a state of metabolic acidosis around 7/16, but recent ABG pH in normal range. However, patient has been bicarb deficient throughout the admission.   Plan:  - f/u nephro recs  - Continue bicarb 650mg tid considering hypernatremia  - Per nephro, patient is not a dialysis candidate  - trend BUN/Cr   - monitor Is and Os     #Hypernatremia (improving)  Patient having a gradually increasing sodium since transfer to the MICU. Current Na improving at 148 (7/25)  Course:  - 7/22: s/p lasix 40mg and metolazone 5mg   - 7/23: s/p D5W 1L bolus  Plan:  - Continue free water 300mL q6  - Continue lower dose sodium bicarb 650 mg tid as it may be a contributing factor  - Start bumex 1mg q12 IV  - Start metolazone 5mg PO daily    #Urinary retention (improving)  Plan:  - Started Doxazosin 2 mg daily (7/15)  - Monitor I/Os  - Ivory placed by urology 7/19, making urine    GASTROINTESTINAL  #Upper GI Bleed (stable)  Course:  - 7/9 CT A/P - Active bleeding in the third portion of the duodenum. Progression of liver metastases.  - 7/9 EGD showed esophagitis, One non-bleeding duodenal ulcer with a clean ulcer base (Arjun Class III). One oozing duodenal ulcer with spurting hemorrhage (Arjun Class Ia). Treatment not successful. Treated with bipolar cautery.  - 7/9 s/p IR GDA embolization  - 7/18 Patient had melanotic stool with hemoglobin drop 10.1 to 7.1; EGD showing duodenal bleed s/p 3 clips + epi  - CTA (7/20) showed no active bleed  - Total EGDs: 3  Plan:  - Continue pantoprazole 40 mg IV q12  - monitor Hgb, transfuse as needed for Hgb <8  - continue to f/u with GI and IR     - Per GI, "Will need PPI BID for 8 weeks for grade D esophagitis and PUD"    - Per GI, high risk of 30 day rebleed (>10-20% risk)  - No acute IR intervention per IR because no active area of extravasation on imaging (7/21)  - No acute GI intervention per GI (7/21)  - Surgery evaluated patient 7/23, may be a candidate for duodenotomy to oversew ulcers, but patient cannot be on aspirin  - As patient is s/p aspirin x 1 dose (7/23), will continue to update surgery and reach out tomorrow to re-evaluate; patient is currently stable  - F/u Helicobacter pylori antigen    #Nutrition  Course:  - FEES (7/17) failed  - ENT consulted for vallecular lesion, diagnosed vallecular cyst, no inpatient intervention required, outpatient f/u  - Patient currently extubated with NG tube (7/24)  Plan:  - Continue trickle feeds (10 cc/hr)  - When ready to start PO diet, speech & swallow evaluation (do not have to do nurse dysphagia screen first), hold off for today    #Diarrhea (improving)  Course:  - c. diff + (7/14)  - started vancomycin (7/14 -7/23) active infection dose, started vancomycin prophylaxis (7/24-)   - having worsening diarrhea, no melena (7/16)  Plan:  - Continue prophylactic dose of vancomycin q12   - Fluids as necessary to compensate for volume loss 2/2 diarrhea    #Transaminitis (improving)  Findings:  - Bili 1.1 (7/16) -> 1.3 (7/17) ->0.5 (7/24)  - Alk P 359 (7/16)-> 484 (7/17) -> 174 (7/24)  -  (7/16)->129 (7/17) -> 75 (7/24)  - ALT 67 (7/16)-> 72 (7/17) -> 30 (7/24)  - RUQ US (7/17): known liver mets, no acute findings  Plan:  - Continue atorvastatin 80 mg daily    INFECTIOUS DISEASE  #leukocytosis (improving)  Cultures:  - Blood cultures negative throughout admission  - Sputum Cx from ETT 7/13 showed numerous gram neg krishna (Klebsiella oxytoca/raoultella ornithinolytica)  - GI PCR negative  - MRSA swab negative  Antibiotics Course:  - Vancomycin (7/14 - 7/23), Vancomycin Prophylaxis (7/24 - )  - Zosyn (7/11-7/18), resistance shown, switched to meropenem  - Meropenem (7/18- )  Plan:  - Continue vancomycin 125 mg q12 for c. diff, prophylaxis while on meropenem, to be d/c after last meropenem dose today  - Continue meropenem 1000 mg q8 until 7/25 (7 day course), last dose today  - Continue to monitor for fevers    ENDOCRINE  #adrenal insufficiency (stable)  Findings:  -7/5 cortisol 17   - 7/11 cortisol 37.1   Plan:  - Continue hydrocortisone 50mg q6  - Monitor sugars with steroid    HEMATOLOGY/ONCOLOGY   # neuroendocrine tumor (unchanged)  - was on lanreotide then had POD in liver and started on peptide receptor radiotherapy (PRRT)- typically given every 8 weeks for 4 doses. He last received it 10/20/2023   - PET 5/28/24 shows new somatostatin avid osseous lesions; decreased intensely avid right supradiaphragmatic and upper abdominal nodes, suspicious for mets  - Gastrin level 5509  Plan:  - per heme/onc:    - can resume octreotide 150 mcg bid once infection r/o      - can check factor levels V, VIII, X     - "-- f/u with Dr. Sophia Prasad at [facility] [place] after [place], no plan for treatment inpatient, if clinically improves/stabilizes then can be considered for treatment outpatient"    #Normocytic anemia (stable)  - Pt with low Hgb 7-9  - ISO of GI bleed hx and continued melanotic BMs, there is c/o of rebleed; per GI, high risk of rebleeding, currently seems to be slow bleeding   Plan:  - Surgery and GI made aware & are following   - Change CBC to q12 unless having large melanotic stools    #DVT ppx (unchanged)  - SCDs  - LE duplex (7/17): negative for DVT    SKINS:   - Lines/Tubes - PIV, cordis replaced with central line (7/14-7/23), A line (7/20), Subclavian central line (7/24- )    Ethics:   - Full Code   - GOC 7/12, spoke to spouse (Yamilet) over phone with palliative care team, discussed GOC again on 7/16    Consults this admission: GI, Heme Onc, Palliative, Endocrinology, Cardiology, Nephrology, Interventional Radiology, General Surgery   Assessment	  Assessment: 79 yo male with metastatic neuroendocrine pancreatic cancer (tx on hold) and PMH of CAD s/p PCI x3 with most recent stent 6/12/24 on Plavix and eliquis , chronic Afib on eliquis, orthostatic hypotension on midodrine, PAD , recent admission for dx cath on 6/24/24 presented from PCP's office for hypotension despite taking AM midodrine dose on 7/2, admitted to medicine for symptomatic hypotension and RAZIA. Per chart, on 7/8 PM, pt was noted to have acute onset of melena x5 and coffee ground emesis x2-3. RRT was called on 7/9 AM for hypotension, hgb dropped from 9.2 to 7.4 (admission baseline 10-11), FOBT +, pt was started on PPI IV and transfused 1 unit of pRBC. GI was consulted and underwent EGD on 7/9 afternoon. MICU was consulted for uncontrolled bleeding during EGD. During EGD, pt became hypotensive and was given phenylephrine, had A-fib with RVR s/p amiodarone. Now s/p IR GDA embolization, admitted to MICU for further moniring i.s.o hemorrhagic shock 2/2 GI bleed. On repeat EGD, oozing but no active bleeding was noted. Hospital course c/b c.diff, and patient has been placed on vancomycin. Patient had melanotic stool 7/18 with Hgb drop 10.1 to 7.1 and received 2 units pRBC and 1 unit platelets. Patient was reintubated and underwent EGD 7/18 afternoon. GI found duodenal bleed. Patient now s/p 3x clips and epi. Since 7/18 patient has continued to have melanotic stools with hemoglobin drops concerning for slow rebleeding.     - Total transfusions: 16 pRBC, 4 platelet, 5 FFP (as of 7/26)    Plan:     NEUROLOGIC  # Baseline AOx3 (waxing/waning)  Course:  - intubated 7/9, extubated 7/15  - reintubated 7/18 for EGD, extubated 7/22  - not sedated  - Currently AOx1-2  Plan:  - Delirium precautions    CARDIOVASCULAR  # hemorrhagic shock (improving)  Course:  - Per spouse, pt's baseline BP is a little bit over 100  - 7/9: RRT called 7/9 for hypotension; 2/2 to Upper GI bleeds, urgently took to EGD, found bleeding ulcer that was not well controlled, underwent IR GDA embolization  - 7/18, patient had large melanotic stool with Hgb drop from 10.1-7.1, patient was restarted on henny, vaso, and levo for reintubation for GI scope; s/p 2pRBC, 1 platelet  - Total transfusions: 16 pRBC, 4 platelet, 5 FFP  Findings:  - AM cortisol 17 (7/5), AM cortisol 37.1 (7/11)  - POCUS ECHO (7/16) showed no cardiogenic shock, no tamponade, low SV indeterminate IVC, irregular B lines but not concerning for pulmonary edema, and some subdiaphragmatic fluid  Plan:  - Continue to monitor CBC  - Transfuse for Hgb <8, (goal given recent stent)  - Continue droxidopa 300mg q8  - Try to wean off vaso today; continue to monitor off pressors with goal SBP > 100  - Continue to hold Plavix 75 mg  - Continue midodrine 30mg q8  - Taper hydrocortisone to 50mg bid for refractory shock    #CAD s/p PCI x3, most recent stent 6/12/24, NURIA to pLAD (stable)  Course:  - 7/13 restarted plavix for new stent (6/12/24) per GI, 7/18 held plavix due to bleeding  - 7/22 started low-dose aspirin as safer alternative to plavix per cardiology, but d/c 7/23 as patient bled again  Plan:  - Continue to hold Plavix 75 mg and aspirin 81 mg  - Continue atorvastatin 80 mg daily    #PAD  # A-fib on eliquis (improving)  May have been worsened because of GI bleed  - s/p successful DCCV 10/31   - 7/16 In RVR, 2 doses of amio overnight; Cards recommended resuming home dose sotalol 40 mg PO (qTC wnl), s/p 1 dose sotalol but RVR persisted  - 7/16 Started amio loading  - 7/22 Added digoxin load for persistent HR 130s over past week  - 7/23 Dig level 1.3, patient's HR back in 60s in the afternoon  Plan:  - Hold eliquis   - Continue amio 200 mg daily  - Continue digoxin maintenance dose 125 mcg daily  - Hold home sotalol  - Cardiology following, advises against chemical cardioversion off AC if possible    #NSVT  Likely 2/2 cardiac structural damage given prior stents as well as antiarrythmic medications.  Plan:  - Continue to monitor  - Replete electrolytes as necessary  - Discuss with cardiology    PULMONARY  #Intubated for airway protection prior to EGD/IR embolization (Resolved)  Course:  - Extubated 7/22, SpO2 100% on RA    RENAL/  #RAZIA (resolved)  - Cr peaked at 3.38 (7/11)  - Currently Cr 1.13 ( 7/24)  Plan:  - Continue to monitor Cr    #ATN i.s.o hypotension  #metabolic acidosis (improving)  Patient was in a state of metabolic acidosis around 7/16, but recent ABG pH in normal range. However, patient has been bicarb deficient throughout the admission.   Plan:  - f/u nephro recs  - Continue bicarb 650mg tid considering hypernatremia  - Per nephro, patient is not a dialysis candidate  - trend BUN/Cr   - monitor Is and Os     #Hypernatremia (improving)  Patient having a gradually increasing sodium since transfer to the MICU. Current Na improving at 148 (7/25)  Course:  - 7/22: s/p lasix 40mg and metolazone 5mg   - 7/23: s/p D5W 1L bolus  Plan:  - Continue free water 300mL q6  - Continue lower dose sodium bicarb 650 mg tid as it may be a contributing factor  - Continue bumex 1mg q12 IV  - Continue metolazone 5mg PO daily    #Urinary retention (improving)  Plan:  - Started Doxazosin 2 mg daily (7/15)  - Monitor I/Os  - Ivory placed by urology 7/19, making urine    GASTROINTESTINAL  #Upper GI Bleed (stable)  Course:  - 7/9 CT A/P - Active bleeding in the third portion of the duodenum. Progression of liver metastases.  - 7/9 EGD showed esophagitis, One non-bleeding duodenal ulcer with a clean ulcer base (Arjun Class III). One oozing duodenal ulcer with spurting hemorrhage (Arjun Class Ia). Treatment not successful. Treated with bipolar cautery.  - 7/9 s/p IR GDA embolization  - 7/18 Patient had melanotic stool with hemoglobin drop 10.1 to 7.1; EGD showing duodenal bleed s/p 3 clips + epi  - CTA (7/20) showed no active bleed  - H. pylori negative  - Total EGDs: 3  Plan:  - Continue pantoprazole 40 mg IV q12  - monitor Hgb, transfuse as needed for Hgb <8  - continue to f/u with GI and IR     - Per GI, "Will need PPI BID for 8 weeks for grade D esophagitis and PUD"    - Per GI, high risk of 30 day rebleed (>10-20% risk)  - No acute IR intervention per IR because no active area of extravasation on imaging (7/21)  - No acute GI intervention per GI (7/21)  - Surgery evaluated patient 7/23, may be a candidate for duodenotomy to oversew ulcers, but patient cannot be on aspirin  - As patient is s/p aspirin x 1 dose (7/23), will continue to update surgery and reach out today to re-evaluate; patient is currently stable    #Nutrition  Course:  - FEES (7/17) failed  - ENT consulted for vallecular lesion, diagnosed vallecular cyst, no inpatient intervention required, outpatient f/u  - Patient currently extubated with NG tube (7/24)  Plan:  - Advance feeds (10 cc/hr)  - When ready to start PO diet, speech & swallow evaluation (do not have to do nurse dysphagia screen first), hold off for today    #Diarrhea (improving)  Course:  - c. diff + (7/14)  - started vancomycin (7/14 -7/23) active infection dose, started vancomycin prophylaxis (7/24-)   - having worsening diarrhea, no melena (7/16)  Plan:  - D/c vancomycin since not on meropenem  - Fluids as necessary to compensate for volume loss 2/2 diarrhea    #Transaminitis (improving)  Findings:  - Bili 1.1 (7/16) -> 1.3 (7/17) ->0.5 (7/24)  - Alk P 359 (7/16)-> 484 (7/17) -> 174 (7/24)  -  (7/16)->129 (7/17) -> 75 (7/24)  - ALT 67 (7/16)-> 72 (7/17) -> 30 (7/24)  - RUQ US (7/17): known liver mets, no acute findings  Plan:  - Continue atorvastatin 80 mg daily    INFECTIOUS DISEASE  #leukocytosis (improving)  Cultures:  - Blood cultures negative throughout admission  - Sputum Cx from ETT 7/13 showed numerous gram neg krishna (Klebsiella oxytoca/raoultella ornithinolytica)  - GI PCR negative  - MRSA swab negative  Antibiotics Course:  - Vancomycin (7/14 - 7/23), Vancomycin Prophylaxis (7/24 -7/25)  - Zosyn (7/11-7/18), resistance shown, switched to meropenem  - Meropenem (7/18-7/25)  Plan:  - No longer on antibiotics  - Continue to monitor for fevers    ENDOCRINE  #adrenal insufficiency (stable)  Findings:  -7/5 cortisol 17   - 7/11 cortisol 37.1   Plan:  - Taper hydrocortisone to 50mg bid  - Monitor sugars with steroid    HEMATOLOGY/ONCOLOGY   # neuroendocrine tumor (unchanged)  - was on lanreotide then had POD in liver and started on peptide receptor radiotherapy (PRRT)- typically given every 8 weeks for 4 doses. He last received it 10/20/2023   - PET 5/28/24 shows new somatostatin avid osseous lesions; decreased intensely avid right supradiaphragmatic and upper abdominal nodes, suspicious for mets  - Gastrin level 5509  Plan:  - per heme/onc:    - can resume octreotide 150 mcg bid once infection r/o      - can check factor levels V, VIII, X     - "-- f/u with Dr. Sophia Prasad at [facility] [place], [place] after discharge, no plan for treatment inpatient, if clinically improves/stabilizes then can be considered for treatment outpatient"    #Normocytic anemia (stable)  - Pt with low Hgb 7-9  - ISO of GI bleed hx and continued melanotic BMs, there is c/o of rebleed; per GI, high risk of rebleeding, currently seems to be slow bleeding   Plan:  - Surgery and GI made aware & are following   - Continue CBC to q12 unless having large melanotic stools    #DVT ppx (unchanged)  - SCDs  - LE duplex (7/17): negative for DVT    SKINS:   - Lines/Tubes - PIV, cordis replaced with central line (7/14-7/23), A line (7/20), Subclavian central line (7/24- )    Ethics:   - Full Code   - GOC 7/12, spoke to spouse (Yamilet) over phone with palliative care team, discussed GOC again on 7/16    Consults this admission: GI, Heme Onc, Palliative, Endocrinology, Cardiology, Nephrology, Interventional Radiology, General Surgery   Assessment	  Assessment: 77 yo male with metastatic neuroendocrine pancreatic cancer (tx on hold) and PMH of CAD s/p PCI x3 with most recent stent 6/12/24 on Plavix and eliquis , chronic Afib on eliquis, orthostatic hypotension on midodrine, PAD , recent admission for dx cath on 6/24/24 presented from PCP's office for hypotension despite taking AM midodrine dose on 7/2, admitted to medicine for symptomatic hypotension and RAZIA. Per chart, on 7/8 PM, pt was noted to have acute onset of melena x5 and coffee ground emesis x2-3. RRT was called on 7/9 AM for hypotension, hgb dropped from 9.2 to 7.4 (admission baseline 10-11), FOBT +, pt was started on PPI IV and transfused 1 unit of pRBC. GI was consulted and underwent EGD on 7/9 afternoon. MICU was consulted for uncontrolled bleeding during EGD. During EGD, pt became hypotensive and was given phenylephrine, had A-fib with RVR s/p amiodarone. Now s/p IR GDA embolization, admitted to MICU for further moniring i.s.o hemorrhagic shock 2/2 GI bleed. On repeat EGD, oozing but no active bleeding was noted. Hospital course c/b c.diff, and patient has been placed on vancomycin. Patient had melanotic stool 7/18 with Hgb drop 10.1 to 7.1 and received 2 units pRBC and 1 unit platelets. Patient was reintubated and underwent EGD 7/18 afternoon. GI found duodenal bleed. Patient now s/p 3x clips and epi. Since 7/18 patient has continued to have melanotic stools with hemoglobin drops concerning for slow rebleeding.     - Total transfusions: 16 pRBC, 4 platelet, 5 FFP (as of 7/26)    Plan:     NEUROLOGIC  # Baseline AOx3 (waxing/waning)  Course:  - intubated 7/9, extubated 7/15  - reintubated 7/18 for EGD, extubated 7/22  - not sedated  - Currently AOx1-2  Plan:  - Delirium precautions    CARDIOVASCULAR  # hemorrhagic shock (improving)  Course:  - Per spouse, pt's baseline BP is a little bit over 100  - 7/9: RRT called 7/9 for hypotension; 2/2 to Upper GI bleeds, urgently took to EGD, found bleeding ulcer that was not well controlled, underwent IR GDA embolization  - 7/18, patient had large melanotic stool with Hgb drop from 10.1-7.1, patient was restarted on henny, vaso, and levo for reintubation for GI scope; s/p 2pRBC, 1 platelet  - Total transfusions: 16 pRBC, 4 platelet, 5 FFP  Findings:  - AM cortisol 17 (7/5), AM cortisol 37.1 (7/11)  - POCUS ECHO (7/16) showed no cardiogenic shock, no tamponade, low SV indeterminate IVC, irregular B lines but not concerning for pulmonary edema, and some subdiaphragmatic fluid  Plan:  - Continue to monitor CBC  - Transfuse for Hgb <8, (goal given recent stent)  - Continue droxidopa 300mg q8  - Try to wean off vaso today; continue to monitor off pressors with goal SBP > 100  - Continue to hold Plavix 75 mg  - Continue midodrine 30mg q8  - Taper hydrocortisone to 50mg bid for refractory shock    #CAD s/p PCI x3, most recent stent 6/12/24, NURIA to pLAD (stable)  Course:  - 7/13 restarted plavix for new stent (6/12/24) per GI, 7/18 held plavix due to bleeding  - 7/22 started low-dose aspirin as safer alternative to plavix per cardiology, but d/c 7/23 as patient bled again  Plan:  - Continue to hold Plavix 75 mg and aspirin 81 mg  - Continue atorvastatin 80 mg daily    #PAD  # A-fib on eliquis (improving)  May have been worsened because of GI bleed  - s/p successful DCCV 10/31   - 7/16 In RVR, 2 doses of amio overnight; Cards recommended resuming home dose sotalol 40 mg PO (qTC wnl), s/p 1 dose sotalol but RVR persisted  - 7/16 Started amio loading  - 7/22 Added digoxin load for persistent HR 130s over past week  - 7/23 Dig level 1.3, patient's HR back in 60s in the afternoon  Plan:  - Hold eliquis   - Continue amio 200 mg daily  - Continue digoxin maintenance dose 125 mcg daily  - Hold home sotalol  - Cardiology following, advises against chemical cardioversion off AC if possible    #NSVT (worsening)  Likely 2/2 cardiac structural damage given prior stents as well as electrolyte abnormalities while being diuresed. Troponins elevated but stable today. Troponins also elevated earlier in admission. EKG without ST elevations.  Plan:  - Continue to monitor  - Replete electrolytes as necessary    PULMONARY  #Intubated for airway protection prior to EGD/IR embolization (resolved)  Course:  - Extubated 7/22, SpO2 100% on RA    RENAL/  #RAZIA (resolved)  - Cr peaked at 3.38 (7/11)  - Currently Cr 1.13 ( 7/24)  Plan:  - Continue to monitor Cr    #ATN i.s.o hypotension  #metabolic acidosis (improving)  Patient was in a state of metabolic acidosis around 7/16, but recent ABG pH in normal range. However, patient has been bicarb deficient throughout the admission.   Plan:  - f/u nephro recs  - Continue bicarb 650mg tid considering hypernatremia  - Per nephro, patient is not a dialysis candidate  - trend BUN/Cr   - monitor Is and Os     #Hypernatremia (improving)  Patient having a gradually increasing sodium since transfer to the MICU. Current Na improving at 148 (7/25)  Course:  - 7/22: s/p lasix 40mg and metolazone 5mg   - 7/23: s/p D5W 1L bolus  Plan:  - Continue free water 300mL q6  - Continue lower dose sodium bicarb 650 mg tid as it may be a contributing factor  - Continue bumex 1mg q12 IV  - Continue metolazone 5mg PO daily    #Urinary retention (improving)  Plan:  - Started Doxazosin 2 mg daily (7/15)  - Monitor I/Os  - Ivory placed by urology 7/19, making urine    GASTROINTESTINAL  #Upper GI Bleed (stable)  Course:  - 7/9 CT A/P - Active bleeding in the third portion of the duodenum. Progression of liver metastases.  - 7/9 EGD showed esophagitis, One non-bleeding duodenal ulcer with a clean ulcer base (Arjun Class III). One oozing duodenal ulcer with spurting hemorrhage (Arjun Class Ia). Treatment not successful. Treated with bipolar cautery.  - 7/9 s/p IR GDA embolization  - 7/18 Patient had melanotic stool with hemoglobin drop 10.1 to 7.1; EGD showing duodenal bleed s/p 3 clips + epi  - CTA (7/20) showed no active bleed  - H. pylori negative  - Total EGDs: 3  Plan:  - Continue pantoprazole 40 mg IV q12  - monitor Hgb, transfuse as needed for Hgb <8  - continue to f/u with GI and IR     - Per GI, "Will need PPI BID for 8 weeks for grade D esophagitis and PUD"    - Per GI, high risk of 30 day rebleed (>10-20% risk)  - No acute IR intervention per IR because no active area of extravasation on imaging (7/21)  - No acute GI intervention per GI (7/21)  - Surgery evaluated patient 7/23, may be a candidate for duodenotomy to oversew ulcers, but patient cannot be on aspirin  - As patient is s/p aspirin x 1 dose (7/23), will continue to update surgery and reach out to re-evaluate; patient is currently stable  - Will reach out to GI about long-term goals including risk of not being on aspirin vs bleeding    #Nutrition (improving)  Course:  - FEES (7/17) failed  - ENT consulted for vallecular lesion, diagnosed vallecular cyst, no inpatient intervention required, outpatient f/u  - Patient currently extubated with NG tube (7/24)  Plan:  - Advance feeds per RD, added multivitamin  - Plan for FEES Monday per speech and swallow    #Diarrhea (resolved)  Course:  - c. diff + (7/14)  - vancomycin (7/14 -7/23) active infection dose, vancomycin prophylaxis (7/24-7/25)   Plan:  - No longer on vancomycin    #Transaminitis (improving)  Findings:  - Bili 1.1 (7/16) -> 1.3 (7/17) ->0.5 (7/24)  - Alk P 359 (7/16)-> 484 (7/17) -> 174 (7/24)  -  (7/16)->129 (7/17) -> 75 (7/24)  - ALT 67 (7/16)-> 72 (7/17) -> 30 (7/24)  - RUQ US (7/17): known liver mets, no acute findings  Plan:  - Continue atorvastatin 80 mg daily    INFECTIOUS DISEASE  #leukocytosis (resolved)  Cultures:  - Blood cultures negative throughout admission  - Sputum Cx from ETT 7/13 showed numerous gram neg krishna (Klebsiella oxytoca/raoultella ornithinolytica)  - GI PCR negative  - MRSA swab negative  Antibiotics Course:  - Vancomycin (7/14 - 7/23), Vancomycin Prophylaxis (7/24 -7/25)  - Zosyn (7/11-7/18), resistance shown, switched to meropenem  - Meropenem (7/18-7/25)  Plan:  - No longer on antibiotics  - Continue to monitor for fevers    ENDOCRINE  #adrenal insufficiency (stable)  Findings:  -7/5 cortisol 17   - 7/11 cortisol 37.1   Plan:  - Taper hydrocortisone to 50mg bid  - Monitor sugars with steroid    HEMATOLOGY/ONCOLOGY   # neuroendocrine tumor (unchanged)  - was on lanreotide then had POD in liver and started on peptide receptor radiotherapy (PRRT)- typically given every 8 weeks for 4 doses. He last received it 10/20/2023   - PET 5/28/24 shows new somatostatin avid osseous lesions; decreased intensely avid right supradiaphragmatic and upper abdominal nodes, suspicious for mets  - Gastrin level 5509  Plan:  - per heme/onc:    - can resume octreotide 150 mcg bid once infection r/o      - can check factor levels V, VIII, X     - "-- [place] [facility] [place] no plan for treatment inpatient, if clinically improves/stabilizes then can be considered for treatment outpatient"    #Normocytic anemia (stable)  - Pt with low Hgb 7-9  - ISO of GI bleed hx and continued melanotic BMs, there is c/o of rebleed; per GI, high risk of rebleeding, currently seems to be slow bleeding   Plan:  - Surgery and GI made aware & are following   - Continue CBC to q12 unless having large melanotic stools    #DVT ppx (unchanged)  - SCDs  - LE duplex (7/17): negative for DVT    SKINS:   - Lines/Tubes - PIV, cordis replaced with central line (7/14-7/23), A line (7/20), Subclavian central line (7/24- )    Ethics:   - Full Code   - GOC 7/12, spoke to spouse (Yamilet) over phone with palliative care team, discussed GOC again on 7/16    Consults this admission: GI, Heme Onc, Palliative, Endocrinology, Cardiology, Nephrology, Interventional Radiology, General Surgery   Assessment	  Assessment: 79 yo male with metastatic neuroendocrine pancreatic cancer (tx on hold) and PMH of CAD s/p PCI x3 with most recent stent 6/12/24 on Plavix and eliquis , chronic Afib on eliquis, orthostatic hypotension on midodrine, PAD , recent admission for dx cath on 6/24/24 presented from PCP's office for hypotension despite taking AM midodrine dose on 7/2, admitted to medicine for symptomatic hypotension and RAZIA. Per chart, on 7/8 PM, pt was noted to have acute onset of melena x5 and coffee ground emesis x2-3. RRT was called on 7/9 AM for hypotension, hgb dropped from 9.2 to 7.4 (admission baseline 10-11), FOBT +, pt was started on PPI IV and transfused 1 unit of pRBC. GI was consulted and underwent EGD on 7/9 afternoon. MICU was consulted for uncontrolled bleeding during EGD. During EGD, pt became hypotensive and was given phenylephrine, had A-fib with RVR s/p amiodarone. Now s/p IR GDA embolization, admitted to MICU for further moniring i.s.o hemorrhagic shock 2/2 GI bleed. On repeat EGD, oozing but no active bleeding was noted. Hospital course c/b c.diff, and patient has been placed on vancomycin. Patient had melanotic stool 7/18 with Hgb drop 10.1 to 7.1 and received 2 units pRBC and 1 unit platelets. Patient was reintubated and underwent EGD 7/18 afternoon. GI found duodenal bleed. Patient now s/p 3x clips and epi. Since 7/18 patient has continued to have melanotic stools with hemoglobin drops concerning for slow rebleeding.     - Total transfusions: 16 pRBC, 4 platelet, 5 FFP (as of 7/26)    Plan:     NEUROLOGIC  # Baseline AOx3 (waxing/waning)  Course:  - intubated 7/9, extubated 7/15  - reintubated 7/18 for EGD, extubated 7/22  - not sedated  - Currently AOx1-2  Plan:  - Delirium precautions    CARDIOVASCULAR  # hemorrhagic shock (improving)  Course:  - Per spouse, pt's baseline BP is a little bit over 100  - 7/9: RRT called 7/9 for hypotension; 2/2 to Upper GI bleeds, urgently took to EGD, found bleeding ulcer that was not well controlled, underwent IR GDA embolization  - 7/18, patient had large melanotic stool with Hgb drop from 10.1-7.1, patient was restarted on henny, vaso, and levo for reintubation for GI scope; s/p 2pRBC, 1 platelet  - Total transfusions: 16 pRBC, 4 platelet, 5 FFP  Findings:  - AM cortisol 17 (7/5), AM cortisol 37.1 (7/11)  - POCUS ECHO (7/16) showed no cardiogenic shock, no tamponade, low SV indeterminate IVC, irregular B lines but not concerning for pulmonary edema, and some subdiaphragmatic fluid  Plan:  - Continue to monitor CBC  - Transfuse for Hgb <8, (goal given recent stent)  - Continue droxidopa 300mg q8  - Try to wean off vaso today; continue to monitor off pressors with goal SBP > 100  - Continue to hold Plavix 75 mg  - Continue midodrine 30mg q8  - Taper hydrocortisone to 50mg bid for refractory shock    #CAD s/p PCI x3, most recent stent 6/12/24, NURIA to pLAD (stable)  Course:  - 7/13 restarted plavix for new stent (6/12/24) per GI, 7/18 held plavix due to bleeding  - 7/22 started low-dose aspirin as safer alternative to plavix per cardiology, but d/c 7/23 as patient bled again  Plan:  - Continue to hold Plavix 75 mg and aspirin 81 mg  - Continue atorvastatin 80 mg daily    #PAD  # A-fib on eliquis (improving)  May have been worsened because of GI bleed  - s/p successful DCCV 10/31   - 7/16 In RVR, 2 doses of amio overnight; Cards recommended resuming home dose sotalol 40 mg PO (qTC wnl), s/p 1 dose sotalol but RVR persisted  - 7/16 Started amio loading  - 7/22 Added digoxin load for persistent HR 130s over past week  - 7/23 Dig level 1.3, patient's HR back in 60s in the afternoon  Plan:  - Hold eliquis   - Continue amio 200 mg daily  - Continue digoxin maintenance dose 125 mcg daily  - Hold home sotalol  - Cardiology following, advises against chemical cardioversion off AC if possible    #NSVT (worsening)  Likely 2/2 cardiac structural damage given prior stents as well as electrolyte abnormalities while being diuresed. Troponins elevated but stable today. Troponins also elevated earlier in admission. EKG without ST elevations.  Plan:  - Continue to monitor  - Replete electrolytes as necessary    PULMONARY  #Intubated for airway protection prior to EGD/IR embolization (resolved)  Course:  - Extubated 7/22, SpO2 100% on RA    RENAL/  #RAZIA (resolved)  - Cr peaked at 3.38 (7/11)  - Currently Cr 1.13 ( 7/24)  Plan:  - Continue to monitor Cr    #ATN i.s.o hypotension  #metabolic acidosis (improving)  Patient was in a state of metabolic acidosis around 7/16, but recent ABG pH in normal range. However, patient has been bicarb deficient throughout the admission.   Plan:  - f/u nephro recs  - Continue bicarb 650mg tid considering hypernatremia  - Per nephro, patient is not a dialysis candidate  - trend BUN/Cr   - monitor Is and Os     #Hypernatremia (improving)  Patient having a gradually increasing sodium since transfer to the MICU. Current Na improving at 148 (7/25)  Course:  - 7/22: s/p lasix 40mg and metolazone 5mg   - 7/23: s/p D5W 1L bolus  Plan:  - Continue free water 300mL q6  - Continue lower dose sodium bicarb 650 mg tid as it may be a contributing factor  - Continue bumex 1mg q12 IV  - Continue metolazone 5mg PO daily    #Urinary retention (improving)  Plan:  - Started Doxazosin 2 mg daily (7/15)  - Monitor I/Os  - Ivory placed by urology 7/19, making urine    GASTROINTESTINAL  #Upper GI Bleed (stable)  Course:  - 7/9 CT A/P - Active bleeding in the third portion of the duodenum. Progression of liver metastases.  - 7/9 EGD showed esophagitis, One non-bleeding duodenal ulcer with a clean ulcer base (Arjun Class III). One oozing duodenal ulcer with spurting hemorrhage (Arjun Class Ia). Treatment not successful. Treated with bipolar cautery.  - 7/9 s/p IR GDA embolization  - 7/18 Patient had melanotic stool with hemoglobin drop 10.1 to 7.1; EGD showing duodenal bleed s/p 3 clips + epi  - CTA (7/20) showed no active bleed  - H. pylori negative  - Total EGDs: 3  Plan:  - Continue pantoprazole 40 mg IV q12  - monitor Hgb, transfuse as needed for Hgb <8  - continue to f/u with GI and IR     - Per GI, "Will need PPI BID for 8 weeks for grade D esophagitis and PUD"    - Per GI, high risk of 30 day rebleed (>10-20% risk)  - No acute IR intervention per IR because no active area of extravasation on imaging (7/21)  - No acute GI intervention per GI (7/21)  - Surgery evaluated patient 7/23, may be a candidate for duodenotomy to oversew ulcers, but patient cannot be on aspirin  - As patient is s/p aspirin x 1 dose (7/23), will continue to update surgery and reach out to re-evaluate; patient is currently stable  - Reached out to GI (7/26) about longer-term goals including risk of not being on aspirin vs bleeding;  ·	Per GI, still a risks vs benefits situation as he may bleed again with ASA, patient may require a surgical intervention next    #Nutrition (improving)  Course:  - FEES (7/17) failed  - ENT consulted for vallecular lesion, diagnosed vallecular cyst, no inpatient intervention required, outpatient f/u  - Patient currently extubated with NG tube (7/24)  Plan:  - Advance feeds per RD, added multivitamin  - Plan for FEES Monday per speech and swallow    #Diarrhea (resolved)  Course:  - c. diff + (7/14)  - vancomycin (7/14 -7/23) active infection dose, vancomycin prophylaxis (7/24-7/25)   Plan:  - No longer on vancomycin    #Transaminitis (improving)  Findings:  - Bili 1.1 (7/16) -> 1.3 (7/17) ->0.5 (7/24)  - Alk P 359 (7/16)-> 484 (7/17) -> 174 (7/24)  -  (7/16)->129 (7/17) -> 75 (7/24)  - ALT 67 (7/16)-> 72 (7/17) -> 30 (7/24)  - RUQ US (7/17): known liver mets, no acute findings  Plan:  - Continue atorvastatin 80 mg daily    INFECTIOUS DISEASE  #leukocytosis (resolved)  Cultures:  - Blood cultures negative throughout admission  - Sputum Cx from ETT 7/13 showed numerous gram neg krishna (Klebsiella oxytoca/raoultella ornithinolytica)  - GI PCR negative  - MRSA swab negative  Antibiotics Course:  - Vancomycin (7/14 - 7/23), Vancomycin Prophylaxis (7/24 -7/25)  - Zosyn (7/11-7/18), resistance shown, switched to meropenem  - Meropenem (7/18-7/25)  Plan:  - No longer on antibiotics  - Continue to monitor for fevers    ENDOCRINE  #adrenal insufficiency (stable)  Findings:  -7/5 cortisol 17   - 7/11 cortisol 37.1   Plan:  - Taper hydrocortisone to 50mg bid  - Monitor sugars with steroid    HEMATOLOGY/ONCOLOGY   # neuroendocrine tumor (unchanged)  - was on lanreotide then had POD in liver and started on peptide receptor radiotherapy (PRRT)- typically given every 8 weeks for 4 doses. He last received it 10/20/2023   - PET 5/28/24 shows new somatostatin avid osseous lesions; decreased intensely avid right supradiaphragmatic and upper abdominal nodes, suspicious for mets  - Gastrin level 5509  Plan:  - per heme/onc:    - can resume octreotide 150 mcg bid once infection r/o      - can check factor levels V, VIII, X     - "-- [place]ia [place] [facility] [place], no plan for treatment inpatient, if clinically improves/stabilizes then can be considered for treatment outpatient"    #Normocytic anemia (stable)  - Pt with low Hgb 7-9  - ISO of GI bleed hx and continued melanotic BMs, there is c/o of rebleed; per GI, high risk of rebleeding, currently seems to be slow bleeding   Plan:  - Surgery and GI made aware & are following   - Continue CBC to q12 unless having large melanotic stools    #DVT ppx (unchanged)  - SCDs  - LE duplex (7/17): negative for DVT    SKINS:   - Lines/Tubes - PIV, cordis replaced with central line (7/14-7/23), A line (7/20), Subclavian central line (7/24- )    Ethics:   - Full Code   - [facility] 7/12, spoke to spouse (Yamilet) over phone with palliative care team, discussed GOC again on 7/16    Consults this admission: GI, Heme Onc, Palliative, Endocrinology, Cardiology, Nephrology, Interventional Radiology, General Surgery

## 2024-07-26 NOTE — PROGRESS NOTE ADULT - SUBJECTIVE AND OBJECTIVE BOX
Patient is a 78y old  Male who presents with a chief complaint of hypotension (26 Jul 2024 13:30)    Patient seen and examined  Appears comfortable, no new complaints    MEDICATIONS  (STANDING):  aMIOdarone    Tablet 200 milliGRAM(s) Oral daily  aMIOdarone    Tablet   Oral   atorvastatin 80 milliGRAM(s) Oral at bedtime  buMETAnide Injectable 1 milliGRAM(s) IV Push two times a day  chlorhexidine 2% Cloths 1 Application(s) Topical <User Schedule>  chlorhexidine 4% Liquid 1 Application(s) Topical <User Schedule>  digoxin     Tablet 125 MICROGram(s) Oral daily  doxazosin 2 milliGRAM(s) Oral at bedtime  droxidopa 300 milliGRAM(s) Oral every 8 hours  folic acid Injectable 1 milliGRAM(s) IV Push daily  hydrocortisone sodium succinate Injectable 50 milliGRAM(s) IV Push every 12 hours  insulin lispro (ADMELOG) corrective regimen sliding scale   SubCutaneous every 6 hours  metolazone 5 milliGRAM(s) Oral daily  midodrine 30 milliGRAM(s) Oral every 8 hours  multivitamin 1 Tablet(s) Oral daily  pantoprazole  Injectable 40 milliGRAM(s) IV Push every 12 hours  sodium bicarbonate 650 milliGRAM(s) Oral three times a day  thiamine Injectable 100 milliGRAM(s) IV Push daily  vasopressin Infusion 0.04 Unit(s)/Min (6 mL/Hr) IV Continuous <Continuous>    MEDICATIONS  (PRN):  sodium chloride 0.9% lock flush 10 milliLiter(s) IV Push every 1 hour PRN Pre/post blood products, medications, blood draw, and to maintain line patency    Vital Signs Last 24 Hrs  T(C): 36.4 (26 Jul 2024 08:00), Max: 36.8 (25 Jul 2024 16:00)  T(F): 97.5 (26 Jul 2024 08:00), Max: 98.2 (25 Jul 2024 16:00)  HR: 74 (26 Jul 2024 12:15) (65 - 91)  BP: --  BP(mean): --  RR: 17 (26 Jul 2024 12:15) (13 - 32)  SpO2: 100% (26 Jul 2024 12:15) (73% - 100%)    Parameters below as of 25 Jul 2024 20:00  Patient On (Oxygen Delivery Method): room air    PE  Awake, alert  Anicteric, MMM  CTAB  Abd soft, NT, ND  +NGT  No c/c                        9.0    9.46  )-----------( 102      ( 26 Jul 2024 07:37 )             26.7       07-26    146<H>  |  114<H>  |  41<H>  ----------------------------<  184<H>  3.4<L>   |  18<L>  |  0.89    Ca    7.7<L>      26 Jul 2024 07:37  Phos  2.7     07-26  Mg     2.1     07-26    TPro  4.1<L>  /  Alb  2.3<L>  /  TBili  0.6  /  DBili  x   /  AST  62<H>  /  ALT  29  /  AlkPhos  216<H>  07-26

## 2024-07-26 NOTE — PROGRESS NOTE ADULT - SUBJECTIVE AND OBJECTIVE BOX
DATE OF SERVICE: 07-26-24 @ 13:30    Patient is a 78y old  Male who presents with a chief complaint of hypotension (26 Jul 2024 05:53)      INTERVAL HISTORY: In no acute distress.     REVIEW OF SYSTEMS:  CONSTITUTIONAL: No weakness  EYES/ENT: No visual changes;  No throat pain   NECK: No pain or stiffness  RESPIRATORY: No cough, wheezing; No shortness of breath  CARDIOVASCULAR: No chest pain or palpitations  GASTROINTESTINAL: No abdominal  pain. No nausea, vomiting, or hematemesis  GENITOURINARY: No dysuria, frequency or hematuria  NEUROLOGICAL: No stroke like symptoms  SKIN: No rashes    TELEMETRY Personally reviewed: AF 60-90  	  MEDICATIONS:  aMIOdarone    Tablet   Oral   aMIOdarone    Tablet 200 milliGRAM(s) Oral daily  buMETAnide Injectable 1 milliGRAM(s) IV Push two times a day  digoxin     Tablet 125 MICROGram(s) Oral daily  doxazosin 2 milliGRAM(s) Oral at bedtime  droxidopa 300 milliGRAM(s) Oral every 8 hours  metolazone 5 milliGRAM(s) Oral daily  midodrine 30 milliGRAM(s) Oral every 8 hours        PHYSICAL EXAM:  T(C): 36.4 (07-26-24 @ 08:00), Max: 36.8 (07-25-24 @ 16:00)  HR: 74 (07-26-24 @ 12:15) (60 - 91)  BP: --  RR: 17 (07-26-24 @ 12:15) (13 - 32)  SpO2: 100% (07-26-24 @ 12:15) (73% - 100%)  Wt(kg): --  I&O's Summary    25 Jul 2024 07:01  -  26 Jul 2024 07:00  --------------------------------------------------------  IN: 2428.7 mL / OUT: 2350 mL / NET: 78.7 mL    26 Jul 2024 07:01  -  26 Jul 2024 13:30  --------------------------------------------------------  IN: 396 mL / OUT: 800 mL / NET: -404 mL          Appearance: In no distress	  HEENT:    PERRL, EOMI	  Cardiovascular:  S1 S2, No JVD  Respiratory: Lungs clear to auscultation	  Gastrointestinal:  Soft, Non-tender, + BS	  Vascularature:  No edema of LE  Psychiatric: Appropriate affect   Neuro: no acute focal deficits                               9.0    9.46  )-----------( 102      ( 26 Jul 2024 07:37 )             26.7     07-26    146<H>  |  114<H>  |  41<H>  ----------------------------<  184<H>  3.4<L>   |  18<L>  |  0.89    Ca    7.7<L>      26 Jul 2024 07:37  Phos  2.7     07-26  Mg     2.1     07-26    TPro  4.1<L>  /  Alb  2.3<L>  /  TBili  0.6  /  DBili  x   /  AST  62<H>  /  ALT  29  /  AlkPhos  216<H>  07-26        Labs personally reviewed      ASSESSMENT/PLAN: 	    78-year-old male multiple medical problems history of hepatocellular carcinoma status post radiation therapy, AF s/p DCCV on Eliquis who was found to be hypotensive following NST. Patient has reported general weakness, fatigue, lightheadedness since being discharged from hospital. Reports mild chest discomfort in midsternal region. Reports dyspnea with minimal exertion. Also reports significant lack of appetite and poor PO intake. No dark or bloody stool, nausea or vomiting.       Problem/Plan - 1:  ·  Problem: Hypotension  ·  Plan: Continue with pressors in MICU  - Patient presents with hypotension and also RAZIA. Has known orthostatic hypotension.   - Patient and wife report decreased PO intake likely 2/2 malignancy.  - GIB 7/9, s/p 4 units prbc, IR for mesenteric embolization, now in MICU on pressors  - c/w Midodrine, droxidopa and IV pressors (wean as tolerated as per MICU)     Problem/Plan - 2:  ·  Problem: CAD.   ·  Plan: Recent PCI to pLAD in June 2023  - ECG non-ischemic  - Plavix held given large melena; ideally should be on Plavix but high risk to resume      Problem/Plan - 3:  ·  Problem: Atrial Fibrillation  - s/p succesful DCCV 10/31  - Hold Eliquis 5mg BID given GIB  -  c/w Amio 200mg TID per ICU  - c/w dig 125mcg PO daily  - However would advise against chemical cardioversion off AC if possible        Sulma Espinosa, ROSIO-NP   Hank Hayes DO [facility]  Cardiovascular Medicine  [address] [place] [address], [address]  Available through call or text on [employer]  Office: [phone]

## 2024-07-26 NOTE — SWALLOW BEDSIDE ASSESSMENT ADULT - SWALLOW EVAL: DIAGNOSIS
Pt well known to this service w/ prior FEES completed 7/17 w/ recommendations for NPO, with non-oral nutrition/hydration/medications. Pt intubated 7/18 for EGD w/ ongoing melanotic stools with hemoglobin drops, extubated 7/22. GI signed off at this time and SLP cleared for PO trials. However, given prior instrumental assessment findings w/ subsequent intubation, pt requires repeat testing to determine candidacy for oral diet. Mentation and secretion management supportive for tentative FEES 7/29.
Pt is a 77 y/o male w/ PMHx of metastatic neuroendocrine pancreatic cancer (tx on hold) admitted to medicine for symptomatic hypotension and RAZIA. Hospital course remarkable for acute onset melena/coffee ground emesis, hypotension, and GI bleed w/ intubation 7/9-7/15. Pt p/w suspected pharyngeal dysphagia remarkable for delay in pharyngeal trigger, reduced hyolaryngeal elevation, and overt s/s aspiration s/p conservative ice chip trials. FEES recommended to assess swallow profile and determine least restrictive diet.

## 2024-07-26 NOTE — SWALLOW BEDSIDE ASSESSMENT ADULT - SLP GENERAL OBSERVATIONS
Pt encountered in bed, RA, +NGT, awake/alert, + ICU monitoring (O2 95-98; -140; BP 88/63). Cleared by MD and TAVO Castro to proceed working w/ patient and cleared for all consistencies by MD. Pt Aox4 w/ hypophonic vocal quality. Slight wet vocal quality cleared w/ volitional cough/swallow. Able to follow directives and answer open ended questions. Brother and family in law at bedside.
Pt encountered in bed, RA, NAD, +NGT, + icu monitoring (VSS). Baseline congested cough noted. Pt Aox2-3 and able to follow directives.

## 2024-07-26 NOTE — SWALLOW BEDSIDE ASSESSMENT ADULT - PHARYNGEAL PHASE
delayed congested cough (>2 min) unable to discern from baseline cough/Delayed pharyngeal swallow/Decreased laryngeal elevation
Delayed pharyngeal swallow/Decreased laryngeal elevation/Wet vocal quality post oral intake/Cough post oral intake

## 2024-07-26 NOTE — CHART NOTE - NSCHARTNOTEFT_GEN_A_CORE
NUTRITION FOLLOW UP NOTE    PATIENT SEEN FOR: follow up.     SOURCE: [] Patient  [x] Current Medical Record  [x] RN  [] Family/support person at bedside  [x] Patient unavailable/inappropriate  [x] Other: Interdisciplinary Rounds     CHART REVIEWED/EVENTS NOTED.  [] No changes to nutrition care plan to note  [x] Nutrition Status:  - Extubated   - Hemorrhagic shock (improving per notes). Pressors: Vasopressin at 0.04 units/min   - Seen by SLP  with recommendation, "NPO, with non-oral nutrition/hydration/medications."   - Active c. diff infection   - Metastatic neuroendocrine pancreatic cancer     DIET ORDER:   Diet, NPO with Tube Feed:   Tube Feeding Modality: Nasogastric  Jevity 1.5 Sebastian (JEVITY1.5RTH)  Total Volume for 24 Hours (mL): 180  Continuous  Starting Tube Feed Rate {mL per Hour}: 10  Until Goal Tube Feed Rate (mL per Hour): 10  Tube Feed Duration (in Hours): 18  Tube Feed Start Time: 12:00 (24 @ 11:41)    CURRENT DIET ORDER IS:  [] Appropriate:  [] Inadequate:  [x] Other: See recommendations below     NUTRITION INTAKE/PROVISION:  [] PO:   [x] Enteral Nutrition:  Order Provides:  180 ml formula, 270 kcal, 11 g protein, 137 ml free water  Current Pump Rate: 10 mL/hr  Trickle feeds started ; NPO ->  [] Parenteral Nutrition:    ANTHROPOMETRICS:  Drug Dosing Weight  Height (cm): 188 (2024 13:27)  Weight (kg): 125.6 (2024 00:00); previously 98.7 kg ()  BMI (kg/m2): 35.5 (2024 00:00)    Weights:   Daily Weight in k.4 (-), 140.3 (-), 125.6 (), 120.1 ()   Increase in wt likely fluid retention (anai diuresed). RD will continue to trend as new wts available/able.     NUTRITIONALLY PERTINENT MEDICATIONS:  MEDICATIONS  (STANDING):  aMIOdarone    Tablet 200 milliGRAM(s) Oral daily  aMIOdarone    Tablet   Oral   atorvastatin 80 milliGRAM(s) Oral at bedtime  buMETAnide Injectable 1 milliGRAM(s) IV Push two times a day  digoxin     Tablet 125 MICROGram(s) Oral daily  doxazosin 2 milliGRAM(s) Oral at bedtime  droxidopa 300 milliGRAM(s) Oral every 8 hours  folic acid Injectable 1 milliGRAM(s) IV Push daily  hydrocortisone sodium succinate Injectable 50 milliGRAM(s) IV Push every 12 hours  insulin lispro (ADMELOG) corrective regimen sliding scale   SubCutaneous every 6 hours  metolazone 5 milliGRAM(s) Oral daily  midodrine 30 milliGRAM(s) Oral every 8 hours  pantoprazole  Injectable 40 milliGRAM(s) IV Push every 12 hours  potassium chloride   Solution 40 milliEquivalent(s) Oral once  potassium chloride  10 mEq/100 mL IVPB 10 milliEquivalent(s) IV Intermittent every 1 hour  sodium bicarbonate 650 milliGRAM(s) Oral three times a day  thiamine Injectable 100 milliGRAM(s) IV Push daily  vasopressin Infusion 0.04 Unit(s)/Min (6 mL/Hr) IV Continuous <Continuous>    NUTRITIONALLY PERTINENT LABS:   Na146 mmol/L<H> Glu 184 mg/dL<H> K+ 3.4 mmol/L<L> Cr  0.89 mg/dL BUN 41 mg/dL<H>    Phos 2.7 mg/dL    Alb 2.3 g/dL<L>   ALT 29 U/L AST 62 U/L<H> Alkaline Phosphatase 216 U/L<H>  07-15-24 @ 09:38 a1c 6.4    A1C with Estimated Average Glucose Result: 6.4 % (07-15-24 @ 09:38)    Finger Sticks:  POCT Blood Glucose.: 176 mg/dL ( @ 05:24)  POCT Blood Glucose.: 134 mg/dL ( @ 23:08)  POCT Blood Glucose.: 188 mg/dL ( @ 17:01)  POCT Blood Glucose.: 159 mg/dL ( @ 12:29)    NUTRITIONALLY PERTINENT MEDICATIONS/LABS:  [x] Reviewed  [x] Relevant notes on medications/labs:  - Hypernatremia (improving)   - Low K+  - repleted (ordered for potassium chloride)  - Ordered for thiamine, folic acid  - Elevated BG; sliding scale of insulin; on steroid which can elevate BG    EDEMA:  [x] Reviewed: +4 generalized  [] Relevant notes:    GI/ I&O:  [x] Reviewed  [x] Relevant notes: Last BM  - small melena bm  [x] Other: Extensive GI Hx, see notes    SKIN:   [] No pressure injuries documented, per nursing flowsheet  [] Pressure injury previously noted  [x] Change in pressure injury documentation: Suspected deep tissue injury Right medial back  [] Other:    ESTIMATED NEEDS:  Based on previous dosing wt 98.7 kg (-)  Energy: (23-28 kcal/kg) 2270 - 2764 kcal/day  Protein: (1.2-1.4 g/kg) 118 - 138 g/day   Fluid needs deferred to provider.     NUTRITION DIAGNOSIS:  [x] Prior Dx: 1) Severe chronic malnutrition 2) Increased Nutrient Needs  [] New Dx:    EDUCATION:  [] Yes:  [x] Not appropriate/warranted    NUTRITION CARE PLAN:  1. Diet: When/If able Vital 1.5 at 10 mL/hr increasing only as tolerated and electrolytes WNL to goal rate 65 mL/hr x24 hours with ProSource TF Free x1 daily to provide total volume 1560 mL, 2430 kcals, 120 gm protein, and 1192 mL free water. Meets 25 kcals/kG and 1.2 gm protein/kG based on previous dosing wt 98.7 kG.   -> Refeed risk given prolonged NPO; 24 hour feeds for refeeding risk  -> Consider alternate route of nutrition if unable to fed via GI tract  2. Multivitamin/mineral supplementation: As medically feasible, continue to provide thiamine and add multivitamin for refeeding risk.     [] Achieved - Continue current nutrition intervention(s)  [] Current medical condition precludes nutrition intervention at this time.    MONITORING AND EVALUATION:   RD remains available upon request and will follow up per protocol.    Khadijah Vigil, MS, RD, CDN, CNSC, CDCES TEAMS    NUTRITION FOLLOW UP NOTE    PATIENT SEEN FOR:    SOURCE: [] Patient  [x] Current Medical Record  [] RN  [] Family/support person at bedside  [] Patient unavailable/inappropriate  [x] Other: IDR    CHART REVIEWED/EVENTS NOTED.  [] No changes to nutrition care plan to note  [] Nutrition Status:    DIET ORDER:   Diet, NPO with Tube Feed:   Tube Feeding Modality: Nasogastric  Jevity 1.5 Sebastian (JEVITY1.5RTH)  Total Volume for 24 Hours (mL): 180  Continuous  Starting Tube Feed Rate {mL per Hour}: 10  Until Goal Tube Feed Rate (mL per Hour): 10  Tube Feed Duration (in Hours): 18  Tube Feed Start Time: 12:00 (24)      CURRENT DIET ORDER IS:  [] Appropriate:  [] Inadequate:  [x] Other: See recommendations below    NUTRITION INTAKE/PROVISION:  [] PO:  [] Enteral Nutrition:  [] Parenteral Nutrition:    ANTHROPOMETRICS:  Drug Dosing Weight  Height (cm): 188 (2024 13:27)  Weight (kg): 125.6 (2024 00:00)  BMI (kg/m2): 35.5 (2024 00:00)  BSA (m2): 2.5 (2024 00:00)  Weights:         NUTRITIONALLY PERTINENT MEDICATIONS/LABS:  [x] Reviewed  [x] Relevant notes on medications/labs:    EDEMA:  [x] Reviewed  [] Relevant notes:    GI/ I&O:  [x] Reviewed  [] Relevant notes:  [] Other:    SKIN:   [] No pressure injuries documented, per nursing flowsheet  [] Pressure injury previously noted  [] Change in pressure injury documentation:  [] Other:    ESTIMATED NEEDS:  Based on   Energy: (xx-xx kcals/kg)  kcal/day   Protein: (xx-xx g/kg)  g/day  Fluid needs deferred to provider  [facility] Equation:     NUTRITION DIAGNOSIS:  [] Prior Dx:  [] New Dx:    EDUCATION:  [] Yes:  [] Not appropriate/warranted    NUTRITION CARE PLAN:  1. Diet:  2. Supplements:  3. Multivitamin/mineral supplementation:  4.    [] Achieved - Continue current nutrition intervention(s)  [] Current medical condition precludes nutrition intervention at this time.    MONITORING AND EVALUATION:   RD remains available upon request and will follow up per protocol.    Khadijah Vigil, MS, RD, CDN, CNSC, CDCES TEAMS NUTRITION FOLLOW UP NOTE    PATIENT SEEN FOR: follow up.     SOURCE: [] Patient  [x] Current Medical Record  [x] RN  [] Family/support person at bedside  [x] Patient unavailable/inappropriate  [x] Other: Interdisciplinary Rounds     CHART REVIEWED/EVENTS NOTED.  [] No changes to nutrition care plan to note  [x] Nutrition Status:  - Extubated   - Hemorrhagic shock (improving per notes). Pressors: Vasopressin at 0.04 units/min   - Seen by speech pathologist  with recommendation, "NPO, with non-oral nutrition/hydration/medications."   - Active c. diff infection   - Metastatic neuroendocrine pancreatic cancer     DIET ORDER:   Diet, NPO with Tube Feed:   Tube Feeding Modality: Nasogastric  Jevity 1.5 Sebastian (JEVITY1.5RTH)  Total Volume for 24 Hours (mL): 180  Continuous  Starting Tube Feed Rate {mL per Hour}: 10  Until Goal Tube Feed Rate (mL per Hour): 10  Tube Feed Duration (in Hours): 18  Tube Feed Start Time: 12:00 (- @ 11:41)  Free water: 300 mL q 6 hours     CURRENT DIET ORDER IS:  [] Appropriate:  [] Inadequate:  [x] Other: See recommendations below     NUTRITION INTAKE/PROVISION:  [] PO:   [x] Enteral Nutrition:  Order Provides:  180 ml formula, 270 kcal, 11 g protein, 137 ml free water  Current Pump Rate: 10 mL/hr  Trickle feeds started ; NPO ->  Pt pending repeat swallow evaluation   [] Parenteral Nutrition:    ANTHROPOMETRICS:  Drug Dosing Weight  Height (cm): 188 (2024 13:27)  Weight (kg): 125.6 (2024 00:00); previously 98.7 kg ()  BMI (kg/m2): 35.5 (2024 00:00)    Weights:   Daily Weight in k.4 (-), 140.3 (-), 125.6 (), 120.1 ()   Increase in wt likely fluid retention (anai diuresed). RD will continue to trend as new wts available/able.     NUTRITIONALLY PERTINENT MEDICATIONS:  MEDICATIONS  (STANDING):  aMIOdarone    Tablet 200 milliGRAM(s) Oral daily  aMIOdarone    Tablet   Oral   atorvastatin 80 milliGRAM(s) Oral at bedtime  buMETAnide Injectable 1 milliGRAM(s) IV Push two times a day  digoxin     Tablet 125 MICROGram(s) Oral daily  doxazosin 2 milliGRAM(s) Oral at bedtime  droxidopa 300 milliGRAM(s) Oral every 8 hours  folic acid Injectable 1 milliGRAM(s) IV Push daily  hydrocortisone sodium succinate Injectable 50 milliGRAM(s) IV Push every 12 hours  insulin lispro (ADMELOG) corrective regimen sliding scale   SubCutaneous every 6 hours  metolazone 5 milliGRAM(s) Oral daily  midodrine 30 milliGRAM(s) Oral every 8 hours  pantoprazole  Injectable 40 milliGRAM(s) IV Push every 12 hours  potassium chloride   Solution 40 milliEquivalent(s) Oral once  potassium chloride  10 mEq/100 mL IVPB 10 milliEquivalent(s) IV Intermittent every 1 hour  sodium bicarbonate 650 milliGRAM(s) Oral three times a day  thiamine Injectable 100 milliGRAM(s) IV Push daily  vasopressin Infusion 0.04 Unit(s)/Min (6 mL/Hr) IV Continuous <Continuous>    NUTRITIONALLY PERTINENT LABS:   Na146 mmol/L<H> Glu 184 mg/dL<H> K+ 3.4 mmol/L<L> Cr  0.89 mg/dL BUN 41 mg/dL<H>    Phos 2.7 mg/dL    Alb 2.3 g/dL<L>   ALT 29 U/L AST 62 U/L<H> Alkaline Phosphatase 216 U/L<H>  07-15-24 @ 09:38 a1c 6.4    A1C with Estimated Average Glucose Result: 6.4 % (07-15-24 @ 09:38)    Finger Sticks:  POCT Blood Glucose.: 176 mg/dL ( @ 05:24)  POCT Blood Glucose.: 134 mg/dL ( @ 23:08)  POCT Blood Glucose.: 188 mg/dL ( @ 17:01)  POCT Blood Glucose.: 159 mg/dL ( @ 12:29)    NUTRITIONALLY PERTINENT MEDICATIONS/LABS:  [x] Reviewed  [x] Relevant notes on medications/labs:  - Hypernatremia (improving)   - Low K+  - repleted (ordered for potassium chloride)  - Ordered for thiamine, folic acid  - Elevated BG; sliding scale of insulin; on steroid which can elevate BG    EDEMA:  [x] Reviewed: +4 generalized  [] Relevant notes:    GI/ I&O:  [x] Reviewed  [x] Relevant notes: Last BM  - small melena bm  [x] Other: Extensive GI Hx, see notes    SKIN:   [] No pressure injuries documented, per nursing flowsheet  [x] Pressure injury previously noted: Suspected deep tissue injury Right medial back  [] Change in pressure injury documentation:   [] Other:    ESTIMATED NEEDS:  Based on previous dosing wt 98.7 kg ()  Energy: (23-28 kcal/kg) 2270 - 2764 kcal/day  Protein: (1.2-1.4 g/kg) 118 - 138 g/day   Fluid needs deferred to provider.     NUTRITION DIAGNOSIS:  [x] Prior Dx: 1) Severe chronic malnutrition 2) Increased Nutrient Needs  [] New Dx:    EDUCATION:  [] Yes:  [x] Not appropriate/warranted    NUTRITION CARE PLAN:  1. Diet: Change feeds to Vital 1.5 at 10 mL/hr increasing only as tolerated and electrolytes WNL to goal rate 65 mL/hr x24 hours with ProSource TF Free x1 daily to provide total volume 1560 mL, 2430 kcals, 120 gm protein, and 1192 mL free water. Meets 25 kcals/kG and 1.2 gm protein/kG based on previous dosing wt 98.7 kG.   -> Refeed risk given prolonged NPO; 24 hour feeds for refeeding risk  -> Consider alternate route of nutrition if unable to fed via GI tract  2. Multivitamin/mineral supplementation: As medically feasible, continue to provide thiamine and add multivitamin for refeeding risk.     [] Achieved - Continue current nutrition intervention(s)  [] Current medical condition precludes nutrition intervention at this time.    MONITORING AND EVALUATION:   RD remains available upon request and will follow up per protocol.    Khadijah Vigil, MS, RD, CDN, CNSC, CDCES TEAMS NUTRITION FOLLOW UP NOTE    PATIENT SEEN FOR: follow up.     SOURCE: [] Patient  [x] Current Medical Record  [x] RN  [] Family/support person at bedside  [x] Patient unavailable/inappropriate  [x] Other: Interdisciplinary Rounds     CHART REVIEWED/EVENTS NOTED.  [] No changes to nutrition care plan to note  [x] Nutrition Status:  - Extubated   - Hemorrhagic shock (improving per notes). Pressors: Vasopressin at 0.04 units/min   - Seen by speech pathologist  with recommendation, "NPO, with non-oral nutrition/hydration/medications."   - Active c. diff infection   - Metastatic neuroendocrine pancreatic cancer     DIET ORDER:   Diet, NPO with Tube Feed:   Tube Feeding Modality: Nasogastric  Jevity 1.5 Sebastian (JEVITY1.5RTH)  Total Volume for 24 Hours (mL): 180  Continuous  Starting Tube Feed Rate {mL per Hour}: 10  Until Goal Tube Feed Rate (mL per Hour): 10  Tube Feed Duration (in Hours): 18  Tube Feed Start Time: 12:00 (- @ 11:41)  Free water: 300 mL q 6 hours     CURRENT DIET ORDER IS:  [] Appropriate:  [] Inadequate:  [x] Other: See recommendations below     NUTRITION INTAKE/PROVISION:  [] PO:   [x] Enteral Nutrition:  Order Provides:  180 ml formula, 270 kcal, 11 g protein, 137 ml free water  Current Pump Rate: 10 mL/hr  Trickle feeds started ; NPO ->  Pt pending repeat swallow evaluation   [] Parenteral Nutrition:    ANTHROPOMETRICS:  Drug Dosing Weight  Height (cm): 188 (2024 13:27)  Weight (kg): 125.6 (2024 00:00); previously 98.7 kg ()  BMI (kg/m2): 35.5 (2024 00:00)    Weights:   Daily Weight in k.4 (-), 140.3 (-), 125.6 (), 120.1 ()   Increase in wt likely fluid retention (anai diuresed). RD will continue to trend as new wts available/able.     NUTRITIONALLY PERTINENT MEDICATIONS:  MEDICATIONS  (STANDING):  aMIOdarone    Tablet 200 milliGRAM(s) Oral daily  aMIOdarone    Tablet   Oral   atorvastatin 80 milliGRAM(s) Oral at bedtime  buMETAnide Injectable 1 milliGRAM(s) IV Push two times a day  digoxin     Tablet 125 MICROGram(s) Oral daily  doxazosin 2 milliGRAM(s) Oral at bedtime  droxidopa 300 milliGRAM(s) Oral every 8 hours  folic acid Injectable 1 milliGRAM(s) IV Push daily  hydrocortisone sodium succinate Injectable 50 milliGRAM(s) IV Push every 12 hours  insulin lispro (ADMELOG) corrective regimen sliding scale   SubCutaneous every 6 hours  metolazone 5 milliGRAM(s) Oral daily  midodrine 30 milliGRAM(s) Oral every 8 hours  pantoprazole  Injectable 40 milliGRAM(s) IV Push every 12 hours  potassium chloride   Solution 40 milliEquivalent(s) Oral once  potassium chloride  10 mEq/100 mL IVPB 10 milliEquivalent(s) IV Intermittent every 1 hour  sodium bicarbonate 650 milliGRAM(s) Oral three times a day  thiamine Injectable 100 milliGRAM(s) IV Push daily  vasopressin Infusion 0.04 Unit(s)/Min (6 mL/Hr) IV Continuous <Continuous>    NUTRITIONALLY PERTINENT LABS:   Na146 mmol/L<H> Glu 184 mg/dL<H> K+ 3.4 mmol/L<L> Cr  0.89 mg/dL BUN 41 mg/dL<H>    Phos 2.7 mg/dL    Alb 2.3 g/dL<L>   ALT 29 U/L AST 62 U/L<H> Alkaline Phosphatase 216 U/L<H>  07-15-24 @ 09:38 a1c 6.4    A1C with Estimated Average Glucose Result: 6.4 % (07-15-24 @ 09:38)    Finger Sticks:  POCT Blood Glucose.: 176 mg/dL ( @ 05:24)  POCT Blood Glucose.: 134 mg/dL ( @ 23:08)  POCT Blood Glucose.: 188 mg/dL ( @ 17:01)  POCT Blood Glucose.: 159 mg/dL ( @ 12:29)    NUTRITIONALLY PERTINENT MEDICATIONS/LABS:  [x] Reviewed  [x] Relevant notes on medications/labs:  - Hypernatremia (improving)   - Low K+  - repleted (ordered for potassium chloride)  - Ordered for thiamine, folic acid  - Elevated BG; sliding scale of insulin; on steroid which can elevate BG    EDEMA:  [x] Reviewed: +4 generalized  [] Relevant notes:    GI/ I&O:  [x] Reviewed  [x] Relevant notes: Last BM  - small melena bm  [x] Other: Extensive GI Hx, see notes    SKIN:   [] No pressure injuries documented, per nursing flowsheet  [x] Pressure injury previously noted: Suspected deep tissue injury Right medial back  [] Change in pressure injury documentation:   [] Other:    ESTIMATED NEEDS:  Based on previous dosing wt 98.7 kg ()  Energy: (23-28 kcal/kg) 2270 - 2764 kcal/day  Protein: (1.2-1.4 g/kg) 118 - 138 g/day   Fluid needs deferred to provider.     NUTRITION DIAGNOSIS:  [x] Prior Dx: 1) Severe chronic malnutrition 2) Increased Nutrient Needs  [] New Dx:    EDUCATION:  [] Yes:  [x] Not appropriate/warranted    NUTRITION CARE PLAN:  1. Diet: Change feeds to Vital 1.5 at 10 mL/hr increasing only as tolerated and electrolytes WNL to goal rate 65 mL/hr x24 hours with ProSource TF Free x1 daily to provide total volume 1560 mL, 2430 kcals, 120 gm protein, and 1192 mL free water. Meets 25 kcals/kG and 1.2 gm protein/kG based on previous dosing wt 98.7 kG.   -> Refeed risk given prolonged inadequate energy intake; 24 hour feeds for refeeding risk  2. Multivitamin/mineral supplementation: As medically feasible, continue to provide thiamine and add multivitamin for refeeding risk.     [] Achieved - Continue current nutrition intervention(s)  [] Current medical condition precludes nutrition intervention at this time.    MONITORING AND EVALUATION:   RD remains available upon request and will follow up per protocol.    Khadijah Vigil, MS, RD, CDN, CNSC, CDCES TEAMS

## 2024-07-26 NOTE — PROGRESS NOTE ADULT - SUBJECTIVE AND OBJECTIVE BOX
Jodie Okeefe MD PGY-1  ---------------------------------------------------------------------------------------------  Patient is a 78y old  Male who presents with a chief complaint of hypotension (20 Jul 2024 22:17)    HPI:  77 yo male with PMH of CAD s/p PCI x3 with most recent stent 6/12/24 on Plavix, chronic Afib on eliquis, orthostatic hypotension on midodrine, PAD, 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 Patient states since discharge on this past Saturday, he continued to feel ongoing generalized weakness and dizziness with positional changes despite compliance with home midodrine 15mg TID and holding torsemide as instructed. Admits to poor PO intake of fluids/solute due to ongoing issues with poor appetite and nausea with meals which is not new. Denies any fever, chills, chest pain, SOB, cough, abd pain, dysuria nor urinary frequency. Has chronic diarrhea that is unchanged from his baseline.     In the ED, vitals notable for SBP 92-11s. Labs notable for elevated BUN/Cr 31/1.59 (from 30/1.18 on day of discharge 6/29/24). CXR with clear lungs. Abd US with innumerable intrahepatic echogenic masses consistent with known metastatic neuroendocrine tumor. Admitted to medicine for symptomatic hypotension and RAZIA. (02 Jul 2024 18:34)    SUBJECTIVE / OVERNIGHT EVENTS:  Overnight,    Today, patient examined at bedside.     Gtt vaso 0.04    Ivory in place    Vitals: HR , SpO2 % RA, BP  (MAPs ), RR , temp     MEDICATIONS  (STANDING):  aMIOdarone    Tablet 400 milliGRAM(s) Oral every 8 hours  aMIOdarone    Tablet 200 milliGRAM(s) Oral daily  aMIOdarone    Tablet   Oral   atorvastatin 80 milliGRAM(s) Oral at bedtime  chlorhexidine 0.12% Liquid 15 milliLiter(s) Oral Mucosa every 12 hours  chlorhexidine 2% Cloths 1 Application(s) Topical <User Schedule>  dexMEDEtomidine Infusion 0.1 MICROgram(s)/kG/Hr (2.47 mL/Hr) IV Continuous <Continuous>  doxazosin 2 milliGRAM(s) Oral at bedtime  folic acid Injectable 1 milliGRAM(s) IV Push daily  insulin lispro (ADMELOG) corrective regimen sliding scale   SubCutaneous every 6 hours  meropenem  IVPB 1000 milliGRAM(s) IV Intermittent every 8 hours  meropenem  IVPB      midodrine 30 milliGRAM(s) Oral every 8 hours  mupirocin 2% Nasal 1 Application(s) Both Nostrils two times a day  norepinephrine Infusion 0.05 MICROgram(s)/kG/Min (9.25 mL/Hr) IV Continuous <Continuous>  pantoprazole Infusion 8 mG/Hr (10 mL/Hr) IV Continuous <Continuous>  phenylephrine    Infusion 0.1 MICROgram(s)/kG/Min (1.85 mL/Hr) IV Continuous <Continuous>  propofol Infusion 20 MICROgram(s)/kG/Min (11.8 mL/Hr) IV Continuous <Continuous>  sodium bicarbonate 1300 milliGRAM(s) Oral three times a day  thiamine Injectable 100 milliGRAM(s) IV Push daily  vancomycin    Solution 125 milliGRAM(s) Oral every 6 hours  vasopressin Infusion 0.04 Unit(s)/Min (6 mL/Hr) IV Continuous <Continuous>    No Known Allergies    ICU Vital Signs Last 24 Hrs  T(F): 98 (21 Jul 2024 04:00), Max: 99 (20 Jul 2024 08:00)  HR: 137 (21 Jul 2024 04:30) (100 - 137)  ABP: 112/54 (21 Jul 2024 04:30) (78/42 - 129/67)  ABP(mean): 78 (21 Jul 2024 04:30) (54 - 94)  RR: 29 (21 Jul 2024 04:30) (18 - 32)  SpO2: 100% (21 Jul 2024 04:30) (97% - 100%)    Physical Exam:   GENERAL: NAD, lying in bed, NG tube in place, awake  HEAD:  Atraumatic, Normocephalic  EYES: EOMI, PERRLA, conjunctiva and sclera clear  CHEST/LUNG: Breath sounds bilaterally. Unlabored respirations  HEART: Irregular rhythm, no longer tachycardic  ABDOMEN: Bowel sounds present; Soft, Nontender, Slight distension  EXTREMITIES: Bilateral edema of upper and lower extremities, nonpainful to touch, cool extremities distally, cyanotic pointer and middle finger right hand, cyanotic distal L toes, similar to yesterday, right distal foot slightly more cyanotic  NERVOUS SYSTEM: Speech clear, no focal deficits, AOx1-2 (self, knows hospital but says [facility])    I&O's Summary    19 Jul 2024 07:01  -  20 Jul 2024 07:00  --------------------------------------------------------  IN: 2987.1 mL / OUT: 1985 mL / NET: 1002.1 mL    20 Jul 2024 07:01  -  21 Jul 2024 04:54  --------------------------------------------------------  IN: 2815.1 mL / OUT: 992 mL / NET: 1823.1 mL      LABS:                        8.1    14.13 )-----------( 103      ( 21 Jul 2024 00:10 )             24.9     07-21    147<H>  |  120<H>  |  52<H>  ----------------------------<  163<H>  3.6   |  14<L>  |  1.40<H>    Ca    7.8<L>      21 Jul 2024 00:10  Phos  4.2     07-21  Mg     1.7     07-21    TPro  4.2<L>  /  Alb  2.1<L>  /  TBili  0.8  /  DBili  x   /  AST  53<H>  /  ALT  38  /  AlkPhos  184<H>  07-21    PT/INR - ( 21 Jul 2024 00:10 )   PT: 11.5 sec;   INR: 1.10 ratio         PTT - ( 21 Jul 2024 00:10 )  PTT:27.1 sec  ABG - ( 21 Jul 2024 00:00 )  pH, Arterial: 7.33  pH, Blood: x     /  pCO2: 30    /  pO2: 136   / HCO3: 16    / Base Excess: -9.2  /  SaO2: 100.0     Urinalysis Basic - ( 21 Jul 2024 00:10 )    Color: x / Appearance: x / SG: x / pH: x  Gluc: 163 mg/dL / Ketone: x  / Bili: x / Urobili: x   Blood: x / Protein: x / Nitrite: x   Leuk Esterase: x / RBC: x / WBC x   Sq Epi: x / Non Sq Epi: x / Bacteria: x      CARDIAC MARKERS ( 21 Jul 2024 00:10 )  x     / x     / 228 U/L / x     / x      CARDIAC MARKERS ( 20 Jul 2024 00:47 )  x     / x     / 137 U/L / x     / x          CAPILLARY BLOOD GLUCOSE      POCT Blood Glucose.: 167 mg/dL (20 Jul 2024 17:08)  POCT Blood Glucose.: 175 mg/dL (20 Jul 2024 12:17)  POCT Blood Glucose.: 107 mg/dL (20 Jul 2024 05:38) Jodie Okeefe MD PGY-1  ---------------------------------------------------------------------------------------------  Patient is a 78y old  Male who presents with a chief complaint of hypotension (20 Jul 2024 22:17)    HPI:  77 yo male with PMH of CAD s/p PCI x3 with most recent stent 6/12/24 on Plavix, chronic Afib on eliquis, orthostatic hypotension on midodrine, PAD, 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 Patient states since discharge on this past Saturday, he continued to feel ongoing generalized weakness and dizziness with positional changes despite compliance with home midodrine 15mg TID and holding torsemide as instructed. Admits to poor PO intake of fluids/solute due to ongoing issues with poor appetite and nausea with meals which is not new. Denies any fever, chills, chest pain, SOB, cough, abd pain, dysuria nor urinary frequency. Has chronic diarrhea that is unchanged from his baseline.     In the ED, vitals notable for SBP 92-11s. Labs notable for elevated BUN/Cr 31/1.59 (from 30/1.18 on day of discharge 6/29/24). CXR with clear lungs. Abd US with innumerable intrahepatic echogenic masses consistent with known metastatic neuroendocrine tumor. Admitted to medicine for symptomatic hypotension and RAZIA. (02 Jul 2024 18:34)    SUBJECTIVE / OVERNIGHT EVENTS:  Overnight, patient had one small melena around 5am. Patient was on and off henny from around 11pm to 3am. Patient has continued on vaso. Hydrocortisone taper has begun to 50 mg bid. Patient has been having frequent runs of NSVTs. Labs sent.    Today, patient examined at bedside. Patient is thirsty but denies additional complaints.    Gtt vaso 0.04    Ivory in place, making urine    Vitals: Afebrile (last fever 7/20), HR 77 (70s overnight), SpO2 100% RA, -55 (MAP 83), RR 13-23    MEDICATIONS  (STANDING):  aMIOdarone    Tablet 400 milliGRAM(s) Oral every 8 hours  aMIOdarone    Tablet 200 milliGRAM(s) Oral daily  aMIOdarone    Tablet   Oral   atorvastatin 80 milliGRAM(s) Oral at bedtime  chlorhexidine 0.12% Liquid 15 milliLiter(s) Oral Mucosa every 12 hours  chlorhexidine 2% Cloths 1 Application(s) Topical <User Schedule>  dexMEDEtomidine Infusion 0.1 MICROgram(s)/kG/Hr (2.47 mL/Hr) IV Continuous <Continuous>  doxazosin 2 milliGRAM(s) Oral at bedtime  folic acid Injectable 1 milliGRAM(s) IV Push daily  insulin lispro (ADMELOG) corrective regimen sliding scale   SubCutaneous every 6 hours  meropenem  IVPB 1000 milliGRAM(s) IV Intermittent every 8 hours  meropenem  IVPB      midodrine 30 milliGRAM(s) Oral every 8 hours  mupirocin 2% Nasal 1 Application(s) Both Nostrils two times a day  norepinephrine Infusion 0.05 MICROgram(s)/kG/Min (9.25 mL/Hr) IV Continuous <Continuous>  pantoprazole Infusion 8 mG/Hr (10 mL/Hr) IV Continuous <Continuous>  phenylephrine    Infusion 0.1 MICROgram(s)/kG/Min (1.85 mL/Hr) IV Continuous <Continuous>  propofol Infusion 20 MICROgram(s)/kG/Min (11.8 mL/Hr) IV Continuous <Continuous>  sodium bicarbonate 1300 milliGRAM(s) Oral three times a day  thiamine Injectable 100 milliGRAM(s) IV Push daily  vancomycin    Solution 125 milliGRAM(s) Oral every 6 hours  vasopressin Infusion 0.04 Unit(s)/Min (6 mL/Hr) IV Continuous <Continuous>    No Known Allergies    ICU Vital Signs Last 24 Hrs  T(F): 98 (21 Jul 2024 04:00), Max: 99 (20 Jul 2024 08:00)  HR: 137 (21 Jul 2024 04:30) (100 - 137)  ABP: 112/54 (21 Jul 2024 04:30) (78/42 - 129/67)  ABP(mean): 78 (21 Jul 2024 04:30) (54 - 94)  RR: 29 (21 Jul 2024 04:30) (18 - 32)  SpO2: 100% (21 Jul 2024 04:30) (97% - 100%)    Physical Exam:   GENERAL: NAD, lying in bed, NG tube in place, awake  HEAD:  Atraumatic, Normocephalic  EYES: EOMI, PERRLA, conjunctiva and sclera clear  CHEST/LUNG: Breath sounds bilaterally. Unlabored respirations  HEART: Irregular rhythm, no longer tachycardic  ABDOMEN: Soft, Nontender, Slight distension  EXTREMITIES: Bilateral edema of upper and lower extremities, slightly less tense, cool extremities distally (R cooler than left), cyanotic pointer and middle finger right hand, cyanotic distal L toes, similar to yesterday  NERVOUS SYSTEM: Speech clear, no focal deficits, AOx1-2 ([facility]")    I&O's Summary    19 Jul 2024 07:01  -  20 Jul 2024 07:00  --------------------------------------------------------  IN: 2987.1 mL / OUT: 1985 mL / NET: 1002.1 mL    20 Jul 2024 07:01  -  21 Jul 2024 04:54  --------------------------------------------------------  IN: 2815.1 mL / OUT: 992 mL / NET: 1823.1 mL      LABS:                        8.1    14.13 )-----------( 103      ( 21 Jul 2024 00:10 )             24.9     07-21    147<H>  |  120<H>  |  52<H>  ----------------------------<  163<H>  3.6   |  14<L>  |  1.40<H>    Ca    7.8<L>      21 Jul 2024 00:10  Phos  4.2     07-21  Mg     1.7     07-21    TPro  4.2<L>  /  Alb  2.1<L>  /  TBili  0.8  /  DBili  x   /  AST  53<H>  /  ALT  38  /  AlkPhos  184<H>  07-21    PT/INR - ( 21 Jul 2024 00:10 )   PT: 11.5 sec;   INR: 1.10 ratio         PTT - ( 21 Jul 2024 00:10 )  PTT:27.1 sec  ABG - ( 21 Jul 2024 00:00 )  pH, Arterial: 7.33  pH, Blood: x     /  pCO2: 30    /  pO2: 136   / HCO3: 16    / Base Excess: -9.2  /  SaO2: 100.0     Urinalysis Basic - ( 21 Jul 2024 00:10 )    Color: x / Appearance: x / SG: x / pH: x  Gluc: 163 mg/dL / Ketone: x  / Bili: x / Urobili: x   Blood: x / Protein: x / Nitrite: x   Leuk Esterase: x / RBC: x / WBC x   Sq Epi: x / Non Sq Epi: x / Bacteria: x      CARDIAC MARKERS ( 21 Jul 2024 00:10 )  x     / x     / 228 U/L / x     / x      CARDIAC MARKERS ( 20 Jul 2024 00:47 )  x     / x     / 137 U/L / x     / x          CAPILLARY BLOOD GLUCOSE      POCT Blood Glucose.: 167 mg/dL (20 Jul 2024 17:08)  POCT Blood Glucose.: 175 mg/dL (20 Jul 2024 12:17)  POCT Blood Glucose.: 107 mg/dL (20 Jul 2024 05:38)

## 2024-07-27 NOTE — PROGRESS NOTE ADULT - NS ATTEND AMEND GEN_ALL_CORE FT
patient seen and examined this morning on rounds with NP.  Please see her note for details.  I am in agreement with her assessment and plan.  Continue isolation and antibiotics for C diff. hemoglobin and hematocrit remain stable.  Episodic hypotension secondary to hemorrhagic shock.  Continue pressors as per ICU.  Continue to monitor for bleed.  Surgery on standby should he require duodenectomy

## 2024-07-27 NOTE — PROGRESS NOTE ADULT - ASSESSMENT
Assessment	  Assessment: 79 yo male with metastatic neuroendocrine pancreatic cancer (tx on hold) and PMH of CAD s/p PCI x3 with most recent stent 6/12/24 on Plavix and eliquis , chronic Afib on eliquis, orthostatic hypotension on midodrine, PAD , recent admission for dx cath on 6/24/24 presented from PCP's office for hypotension despite taking AM midodrine dose on 7/2, admitted to medicine for symptomatic hypotension and RAZIA. Per chart, on 7/8 PM, pt was noted to have acute onset of melena x5 and coffee ground emesis x2-3. RRT was called on 7/9 AM for hypotension, hgb dropped from 9.2 to 7.4 (admission baseline 10-11), FOBT +, pt was started on PPI IV and transfused 1 unit of pRBC. GI was consulted and underwent EGD on 7/9 afternoon. MICU was consulted for uncontrolled bleeding during EGD. During EGD, pt became hypotensive and was given phenylephrine, had A-fib with RVR s/p amiodarone. Now s/p IR GDA embolization, admitted to MICU for further moniring i.s.o hemorrhagic shock 2/2 GI bleed. On repeat EGD, oozing but no active bleeding was noted. Hospital course c/b c.diff, and patient has been placed on vancomycin. Patient had melanotic stool 7/18 with Hgb drop 10.1 to 7.1 and received 2 units pRBC and 1 unit platelets. Patient was reintubated and underwent EGD 7/18 afternoon. GI found duodenal bleed. Patient now s/p 3x clips and epi. Since 7/18 patient has continued to have melanotic stools with hemoglobin drops concerning for slow rebleeding.     - Total transfusions: 16 pRBC, 4 platelet, 5 FFP (as of 7/26)    Plan:     NEUROLOGIC  # Baseline AOx3 (waxing/waning)  Course:  - intubated 7/9, extubated 7/15  - reintubated 7/18 for EGD, extubated 7/22  -reintubated 7/23  - not sedated  - Currently AOx1-2  Plan:  - Delirium precautions    CARDIOVASCULAR  # hemorrhagic shock (improving)  Course:  - Per spouse, pt's baseline BP is a little bit over 100  - 7/9: RRT called 7/9 for hypotension; 2/2 to Upper GI bleeds, urgently took to EGD, found bleeding ulcer that was not well controlled, underwent IR GDA embolization  - 7/18, patient had large melanotic stool with Hgb drop from 10.1-7.1, patient was restarted on henny, vaso, and levo for reintubation for GI scope; s/p 2pRBC, 1 platelet  - Total transfusions: 16 pRBC, 4 platelet, 5 FFP  Findings:  - AM cortisol 17 (7/5), AM cortisol 37.1 (7/11)  - POCUS ECHO (7/16) showed no cardiogenic shock, no tamponade, low SV indeterminate IVC, irregular B lines but not concerning for pulmonary edema, and some subdiaphragmatic fluid  Plan:  - Continue to monitor CBC  - Transfuse for Hgb <8, (goal given recent stent)  - Continue droxidopa 300mg q8 and midodrine 30 mg q8  - Try to wean off vaso; continue to monitor off pressors with goal SBP > 100  - Continue to hold Plavix 75 mg  - Taper hydrocortisone to 50mg bid for refractory shock    #CAD s/p PCI x3, most recent stent 6/12/24, NURIA to pLAD (stable)  Course:  - 7/13 restarted plavix for new stent (6/12/24) per GI, 7/18 held plavix due to bleeding  - 7/22 started low-dose aspirin as safer alternative to plavix per cardiology, but d/c 7/23 as patient bled again  Plan:  - Continue to hold Plavix 75 mg and aspirin 81 mg  - Continue atorvastatin 80 mg daily    #PAD  # A-fib on eliquis (improving)  May have been worsened because of GI bleed  - s/p successful DCCV 10/31   - 7/16 In RVR, 2 doses of amio overnight; Cards recommended resuming home dose sotalol 40 mg PO (qTC wnl), s/p 1 dose sotalol but RVR persisted  - 7/16 Started amio loading  - 7/22 Added digoxin load for persistent HR 130s over past week  - 7/23 Dig level 1.3, patient's HR back in 60s in the afternoon  Plan:  - Hold eliquis   - Continue amio 200 mg daily  - Continue digoxin maintenance dose 125 mcg daily  - Hold home sotalol  - Cardiology following, advises against chemical cardioversion off AC if possible    #NSVT (worsening)  Likely 2/2 cardiac structural damage given prior stents as well as electrolyte abnormalities while being diuresed. Troponins elevated but stable today. Troponins also elevated earlier in admission. EKG without ST elevations.  Plan:  - Continue to monitor  - Replete electrolytes as necessary    PULMONARY  #Intubated for airway protection prior to EGD/IR embolization (resolved)  Course:  - Extubated 7/22, SpO2 100% on RA, reintubated 7/27    RENAL/  #RAZIA (resolved)  - Cr peaked at 3.38 (7/11)  - Currently Cr 1.13 ( 7/24)  Plan:  - Continue to monitor Cr    #ATN i.s.o hypotension  #metabolic acidosis (improving)  Patient was in a state of metabolic acidosis around 7/16, but recent ABG pH in normal range. However, patient has been bicarb deficient throughout the admission.   Plan:  - f/u nephro recs  - Continue bicarb 650mg tid considering hypernatremia  - Per nephro, patient is not a dialysis candidate  - trend BUN/Cr   - monitor Is and Os     #Hypernatremia (improving)  Patient having a gradually increasing sodium since transfer to the MICU. Current Na improving at 148 (7/25)  Course:  - 7/22: s/p lasix 40mg and metolazone 5mg   - 7/23: s/p D5W 1L bolus  Plan:  - Continue free water 300mL q6  - Continue lower dose sodium bicarb 650 mg tid as it may be a contributing factor  - Continue bumex 1mg q12 IV  - Continue metolazone 5mg PO daily    #Urinary retention (improving)  Plan:  - Started Doxazosin 2 mg daily (7/15)  - Monitor I/Os  - Ivory placed by urology 7/19, making urine    GASTROINTESTINAL  #Upper GI Bleed (stable)  Course:  - 7/9 CT A/P - Active bleeding in the third portion of the duodenum. Progression of liver metastases.  - 7/9 EGD showed esophagitis, One non-bleeding duodenal ulcer with a clean ulcer base (Arjun Class III). One oozing duodenal ulcer with spurting hemorrhage (Arjun Class Ia). Treatment not successful. Treated with bipolar cautery.  - 7/9 s/p IR GDA embolization  - 7/18 Patient had melanotic stool with hemoglobin drop 10.1 to 7.1; EGD showing duodenal bleed s/p 3 clips + epi  - CTA (7/20) showed no active bleed  - H. pylori negative  - Total EGDs: 3  Plan:  - Continue pantoprazole 40 mg IV q12  - monitor Hgb, transfuse as needed for Hgb <8  - continue to f/u with GI and IR     - Per GI, "Will need PPI BID for 8 weeks for grade D esophagitis and PUD"    - Per GI, high risk of 30 day rebleed (>10-20% risk)  - No acute IR intervention per IR because no active area of extravasation on imaging (7/21)  - No acute GI intervention per GI (7/21)  - Surgery evaluated patient 7/23, may be a candidate for duodenotomy to oversew ulcers, but patient cannot be on aspirin  - As patient is s/p aspirin x 1 dose (7/23), will continue to update surgery and reach out to re-evaluate; patient is currently stable  -Discuss with GI when aspirin can be restarted    #Nutrition (improving)  Course:  - FEES (7/17) failed  - ENT consulted for vallecular lesion, diagnosed vallecular cyst, no inpatient intervention required, outpatient f/u  - Patient currently extubated with NG tube (7/24)  Plan:  - Advance feeds per RD, added multivitamin  - Plan for FEES Monday per speech and swallow    #Diarrhea (resolved)  Course:  - c. diff + (7/14)  - vancomycin (7/14 -7/23) active infection dose, vancomycin prophylaxis (7/24-7/25)   Plan:  - No longer on vancomycin    #Transaminitis (improving)  Findings:  - Bili 1.1 (7/16) -> 1.3 (7/17) ->0.5 (7/24)  - Alk P 359 (7/16)-> 484 (7/17) -> 174 (7/24)  -  (7/16)->129 (7/17) -> 75 (7/24)  - ALT 67 (7/16)-> 72 (7/17) -> 30 (7/24)  - RUQ US (7/17): known liver mets, no acute findings  Plan:  - Continue atorvastatin 80 mg daily    INFECTIOUS DISEASE  #leukocytosis (resolved)  Cultures:  - Blood cultures negative throughout admission  - Sputum Cx from ETT 7/13 showed numerous gram neg krishna (Klebsiella oxytoca/raoultella ornithinolytica)  - GI PCR negative  - MRSA swab negative  Antibiotics Course:  - Vancomycin (7/14 - 7/23), Vancomycin Prophylaxis (7/24 -7/25)  - Zosyn (7/11-7/18), resistance shown, switched to meropenem  - Meropenem (7/18-7/25)  Plan:  - No longer on antibiotics  - Continue to monitor for fevers    ENDOCRINE  #adrenal insufficiency (stable)  Findings:  -7/5 cortisol 17   - 7/11 cortisol 37.1   Plan:  - Taper hydrocortisone to 50mg bid  - Monitor sugars with steroid    HEMATOLOGY/ONCOLOGY   # neuroendocrine tumor (unchanged)  - was on lanreotide then had POD in liver and started on peptide receptor radiotherapy (PRRT)- typically given every 8 weeks for 4 doses. He last received it 10/20/2023   - PET 5/28/24 shows new somatostatin avid osseous lesions; decreased intensely avid right supradiaphragmatic and upper abdominal nodes, suspicious for mets  - Gastrin level 5509  Plan:  - per heme/onc:    - can resume octreotide 150 mcg bid once infection r/o      - can check factor levels V, VIII, X     - "-- [place] [facility] [place] no plan for treatment inpatient, if clinically improves/stabilizes then can be considered for treatment outpatient"    #Normocytic anemia (stable)  - Pt with low Hgb 7-9  - ISO of GI bleed hx and continued melanotic BMs, there is c/o of rebleed; per GI, high risk of rebleeding, currently seems to be slow bleeding   Plan:  - Surgery and GI made aware & are following   - Continue CBC to q12 unless having large melanotic stools    #DVT ppx (unchanged)  - SCDs  - LE duplex (7/17): negative for DVT    SKINS:   - Lines/Tubes - PIV, cordis replaced with central line (7/14-7/23), A line (7/20), Subclavian central line (7/24- )    Ethics:   - Full Code   - GOC 7/12, spoke to spouse (Yamilet) over phone with palliative care team, discussed GOC again on 7/16    Consults this admission: GI, Heme Onc, Palliative, Endocrinology, Cardiology, Nephrology, Interventional Radiology, General Surgery

## 2024-07-27 NOTE — PROGRESS NOTE ADULT - SUBJECTIVE AND OBJECTIVE BOX
Mary Alice Rhoades MD   Medicine Resident Physician, PGY-1  ---------------------------------------------------------------------------------------------  Patient is a 78y old  Male who presents with a chief complaint of hypotension (27 Jul 2024 14:15)      HPI:  79 yo male with PMH of CAD s/p PCI x3 with most recent stent 6/12/24 on Plavix, chronic Afib on eliquis, orthostatic hypotension on midodrine, PAD, 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 Patient states since discharge on this past Saturday, he continued to feel ongoing generalized weakness and dizziness with positional changes despite compliance with home midodrine 15mg TID and holding torsemide as instructed. Admits to poor PO intake of fluids/solute due to ongoing issues with poor appetite and nausea with meals which is not new. Denies any fever, chills, chest pain, SOB, cough, abd pain, dysuria nor urinary frequency. Has chronic diarrhea that is unchanged from his baseline.     In the ED, vitals notable for SBP 92-11s. Labs notable for elevated BUN/Cr 31/1.59 (from 30/1.18 on day of discharge 6/29/24). CXR with clear lungs. Abd US with innumerable intrahepatic echogenic masses consistent with known metastatic neuroendocrine tumor. Admitted to medicine for symptomatic hypotension and RAZIA. (02 Jul 2024 18:34)      SUBJECTIVE / OVERNIGHT EVENTS:  Had an episode of melanotic stool, CBC was okay. Added Metolazone for diuresis        MEDICATIONS  (STANDING):  aMIOdarone    Tablet 200 milliGRAM(s) Oral daily  aMIOdarone    Tablet   Oral   atorvastatin 80 milliGRAM(s) Oral at bedtime  buMETAnide Injectable 1 milliGRAM(s) IV Push two times a day  chlorhexidine 2% Cloths 1 Application(s) Topical <User Schedule>  chlorhexidine 4% Liquid 1 Application(s) Topical <User Schedule>  digoxin     Tablet 125 MICROGram(s) Oral daily  doxazosin 2 milliGRAM(s) Oral at bedtime  droxidopa 300 milliGRAM(s) Oral every 8 hours  folic acid Injectable 1 milliGRAM(s) IV Push daily  hydrocortisone sodium succinate Injectable 50 milliGRAM(s) IV Push every 12 hours  insulin lispro (ADMELOG) corrective regimen sliding scale   SubCutaneous every 6 hours  metolazone 5 milliGRAM(s) Oral daily  midodrine 30 milliGRAM(s) Oral every 8 hours  multivitamin 1 Tablet(s) Oral daily  pantoprazole  Injectable 40 milliGRAM(s) IV Push every 12 hours  sodium bicarbonate 650 milliGRAM(s) Oral three times a day  thiamine Injectable 100 milliGRAM(s) IV Push daily  vasopressin Infusion 0.04 Unit(s)/Min (6 mL/Hr) IV Continuous <Continuous>    MEDICATIONS  (PRN):  sodium chloride 0.9% lock flush 10 milliLiter(s) IV Push every 1 hour PRN Pre/post blood products, medications, blood draw, and to maintain line patency    Allergies    No Known Allergies    Intolerances        ICU Vital Signs Last 24 Hrs  T(F): 98.8 (27 Jul 2024 12:00), Max: 98.8 (27 Jul 2024 12:00)  HR: 100 (27 Jul 2024 16:00) (72 - 160)  BP: --  BP(mean): --  ABP: 109/54 (27 Jul 2024 16:00) (92/41 - 126/52)  ABP(mean): 73 (27 Jul 2024 16:00) (56 - 79)  RR: 19 (27 Jul 2024 16:00) (14 - 36)  SpO2: 100% (27 Jul 2024 16:00) (94% - 100%)      Physical Exam:   GENERAL: NAD, lying in bed, awake  HEAD:  Atraumatic, Normocephalic  EYES: EOMI, PERRLA, conjunctiva and sclera clear  CHEST/LUNG: Breath sounds bilaterally. Unlabored respirations  HEART: Irregular rhythm, no longer tachycardic  ABDOMEN: Soft, Nontender, Slight distension  EXTREMITIES: Bilateral edema of upper and lower extremities  NERVOUS SYSTEM: Speech clear, no focal deficits, AOx1-2 (self, [facility] [place])      I&O's Summary    26 Jul 2024 07:01  -  27 Jul 2024 07:00  --------------------------------------------------------  IN: 2730.6 mL / OUT: 3570 mL / NET: -839.4 mL    27 Jul 2024 07:01  -  27 Jul 2024 18:59  --------------------------------------------------------  IN: 762 mL / OUT: 1445 mL / NET: -683 mL        LABS:                        7.8    8.49  )-----------( 102      ( 27 Jul 2024 12:53 )             23.7     07-27    147<H>  |  114<H>  |  43<H>  ----------------------------<  311<H>  3.7   |  21<L>  |  0.79    Ca    7.6<L>      27 Jul 2024 12:53  Phos  3.2     07-27  Mg     1.7     07-27    TPro  4.3<L>  /  Alb  2.3<L>  /  TBili  0.6  /  DBili  x   /  AST  76<H>  /  ALT  37  /  AlkPhos  266<H>  07-27    PT/INR - ( 27 Jul 2024 00:50 )   PT: 11.1 sec;   INR: 1.06 ratio         PTT - ( 27 Jul 2024 00:50 )  PTT:24.5 sec  ABG - ( 27 Jul 2024 00:19 )  pH, Arterial: 7.47  pH, Blood: x     /  pCO2: 33    /  pO2: 100   / HCO3: 24    / Base Excess: 0.6   /  SaO2: 98.3                  Urinalysis Basic - ( 27 Jul 2024 12:53 )    Color: x / Appearance: x / SG: x / pH: x  Gluc: 311 mg/dL / Ketone: x  / Bili: x / Urobili: x   Blood: x / Protein: x / Nitrite: x   Leuk Esterase: x / RBC: x / WBC x   Sq Epi: x / Non Sq Epi: x / Bacteria: x      CARDIAC MARKERS ( 26 Jul 2024 11:28 )  x     / x     / 152 U/L / x     / 3.0 ng/mL      CAPILLARY BLOOD GLUCOSE      POCT Blood Glucose.: 195 mg/dL (27 Jul 2024 17:48)  POCT Blood Glucose.: 283 mg/dL (27 Jul 2024 12:16)  POCT Blood Glucose.: 246 mg/dL (27 Jul 2024 06:15)  POCT Blood Glucose.: 223 mg/dL (27 Jul 2024 01:39)      RADIOLOGY & ADDITIONAL TESTS:  Results Reviewed:   Imaging Personally Reviewed:  Electrocardiogram Personally Reviewed:

## 2024-07-27 NOTE — PROGRESS NOTE ADULT - NS ATTEND OPT1A GEN_ALL_CORE
Medical decision making
History/Exam/Medical decision making

## 2024-07-27 NOTE — PROGRESS NOTE ADULT - SUBJECTIVE AND OBJECTIVE BOX
Patient is a 78y old  Male who presents with a chief complaint of hypotension (26 Jul 2024 13:58)    Patient seen and examined  Remains in ICU for pressure support  No new complaints offered    MEDICATIONS  (STANDING):  aMIOdarone    Tablet   Oral   aMIOdarone    Tablet 200 milliGRAM(s) Oral daily  atorvastatin 80 milliGRAM(s) Oral at bedtime  buMETAnide Injectable 1 milliGRAM(s) IV Push two times a day  chlorhexidine 2% Cloths 1 Application(s) Topical <User Schedule>  chlorhexidine 4% Liquid 1 Application(s) Topical <User Schedule>  digoxin     Tablet 125 MICROGram(s) Oral daily  doxazosin 2 milliGRAM(s) Oral at bedtime  droxidopa 300 milliGRAM(s) Oral every 8 hours  folic acid Injectable 1 milliGRAM(s) IV Push daily  hydrocortisone sodium succinate Injectable 50 milliGRAM(s) IV Push every 12 hours  insulin lispro (ADMELOG) corrective regimen sliding scale   SubCutaneous every 6 hours  metolazone 5 milliGRAM(s) Oral daily  midodrine 30 milliGRAM(s) Oral every 8 hours  multivitamin 1 Tablet(s) Oral daily  pantoprazole  Injectable 40 milliGRAM(s) IV Push every 12 hours  sodium bicarbonate 650 milliGRAM(s) Oral three times a day  thiamine Injectable 100 milliGRAM(s) IV Push daily  vasopressin Infusion 0.04 Unit(s)/Min (6 mL/Hr) IV Continuous <Continuous>    MEDICATIONS  (PRN):  sodium chloride 0.9% lock flush 10 milliLiter(s) IV Push every 1 hour PRN Pre/post blood products, medications, blood draw, and to maintain line patency      Vital Signs Last 24 Hrs  T(C): 37.1 (27 Jul 2024 12:00), Max: 37.1 (27 Jul 2024 12:00)  T(F): 98.8 (27 Jul 2024 12:00), Max: 98.8 (27 Jul 2024 12:00)  HR: 115 (27 Jul 2024 12:30) (72 - 142)  BP: --  BP(mean): --  RR: 30 (27 Jul 2024 12:30) (13 - 35)  SpO2: 98% (27 Jul 2024 12:30) (76% - 100%)    Parameters below as of 26 Jul 2024 20:00  Patient On (Oxygen Delivery Method): room air        PE  Awake, alert  Anicteric, MMM  RRR  CTAB  Abd soft, NT, ND  +NGT  No c/c                        8.7    8.27  )-----------( 104      ( 27 Jul 2024 00:50 )             25.7       07-27    145  |  113<H>  |  38<H>  ----------------------------<  229<H>  3.5   |  21<L>  |  0.83    Ca    7.8<L>      27 Jul 2024 00:50  Phos  2.3     07-27  Mg     1.9     07-27    TPro  4.3<L>  /  Alb  2.3<L>  /  TBili  0.6  /  DBili  x   /  AST  76<H>  /  ALT  37  /  AlkPhos  266<H>  07-27

## 2024-07-27 NOTE — PROGRESS NOTE ADULT - ATTENDING COMMENTS
78M w/ CAD (recent stent 6/12/24), Afib, metastatic neuroendocrine pancreatic tumor. Admitted w/ hypotension, course complicated by ABLA and GIB. Undergone EGD #1 on 7/9 which showed uncontrolled bleeding, after which pt developed Afib w/ RVR and hemorrhagic shock, s/p IR for GDA embolization. EGD #2 on 7/12 showed oozing but no active bleeding. Course further complicated by Cdiff colitis. Had another episode of melena and ABLA on 7/18 s/p EGD #3 showed bleeding vessel s/p clipx3 and epi. Pt remains intubated post-procedure and has since been extubated. Continues to have intermittent melanotic stools, and in the last 36 hours has required 2 pRBCs, 1 plt and 1 FFP for continued melena and ABLA. Remains in shock state as well.     Awake and alert. Remains in shock state, etiology initially hemorrhagic now suspect more vasoplegic/distributive will re-evaluate pocus today, unable to wean off vasopressin, appears to have wide PP, so maintain SBP >100; continue midodrine and droxidopa, stress dose steroids; afib w/ RVR resolved s/p IV digoxin load, continue digoxin and amiodarone; attempted asa challenge but then bled again, discuss w/ GI when can restart asa and risks vs benefits given that pt had recent NURIA placed in 6/2024. Satting well on RA. S/P 10 days of vancomycin PO for C diff; completed meropenam for Klebsiella pneumonia; monitor off abx, if needs abx will need ppx dose of PO vanco given recent c diff. RAZIA likely prerenal ATN, stable, non-oliguric; continue diuresis as pt is grossly volume overloaded but needs aggressive repletion of electrolytes; continue free water for hyperNa; tavarez placed by urology - no plan to remove for now. NGT in place as pt has failed several swallow evaluations prior, increase TF rate today; swallow evaluation; surgery holding off any intervention for now in setting of recent asa, f/u if continues to bleed several days after asa; continue PPI q12; s/p EGD x3 and GDA embolization for multiple GIB; do not believe octreotide will be helpful here as pt is not having diarrhea. ABLA due to GIB, received additional pRBC and now hgb stable; count is 16:4:5; CBC q12 today. FS w/ ISS coverage. Prognosis guarded. Full code.

## 2024-07-27 NOTE — PROGRESS NOTE ADULT - ASSESSMENT
77 yo male with PMH of CAD s/p PCI x3 with most recent stent 6/12/24 on Plavix, chronic Afib on eliquis, orthostatic hypotension on midodrine, PAD, neuroendocrine tumor with RT currently on hold, recent admission for dx cath on 6/24/24 who presented for hypotension, admitted for GIB and hemorrhagic shock. Found to be bleeding from duodenum. Transferred to MICU, on pressors.     1. Pancreatic NET- metastatic   -- was on lanreotide then had POD in liver and started on peptide receptor radiotherapy (PRRT)- typically given every 8 weeks for 4 doses. He received one dose on 10/20/2023 and planned to get his 2nd dose at the end of December however his course was complicated by multiple hospitalizations that were noncancer related (decompensated heart failure).   -- 5/28/24 PET Dotatate (compared to 9/2023): several new somatostatin avid osseous lesions, some faintly sclerotic, suspicious for mets. Increased upper RP nodes, probably metastatic. Decreased intensely tracer avid right supradiaphragmatic and upper abdominal nodes, suspicious for metastasis. Redemonstrated intensely somatostatin avid bilobar hepatic lesions, consistent with metastases again morphologically difficult to delineate.   -- CT reviewed by [facility]: SINCE MAY 8 2024 INCREASED HEPATIC METASTASES, NEWLY SEEN PRIMARY TUMOR IN THE PANCREAS, and active bleeding within the duodenum with associated intraluminal hematoma.   -- chromogranin level is elevated at 2058, but [place] [facility] [place]   -- [place]. Sophia Prasad [place] [facility] [place], no plan for chemotherapy inpatient, if clinically improves/stabilizes then can be considered for treatment outpatient  --will recommend starting octreotide 250mcg TID    2. Duodenal bleed, Hemorrhagic shock  - Remains in MICU, s/p extubation 7/22. NG tube in place.  - CT w/ bleed in duodenum. GI called, EGD 7/9 -> S/p  IR embolization 7/9, intubated in MICU -> s/p extubation 7/15 -> intubated 7/18  - s/p multiple transfusions, fibrinogen low would recommend Cry for < 100, but coags have improved as are essentially normal now so unlikely, probably was related to liver dysfunction.   - s/p repeat EGD 7/12 showing duodenal lesions w/clots and oozing, no clear targets for intervention and no active bleeding, purastat applied to all areas of oozing.   - S/p EGD 7/18: duodenal ulcer with active oozing, ulcer with visible vessel. Bleeding treated.   - s/p multiple transfusions, per GI, high risk for rebleed, will continue PPI.   - Repeat CT AP w/ No evidence of GI bleed.  Interval endoscopic versus surgical intervention with metallic streak artifact suggestive of surgical clips in the region of the proximal one third portions of the duodenum. Evaluation of the previously seen hematoma is limited secondary to this artifact. Moderate bilateral pleural effusions and associated compressive atelectasis, right greater than left, overall slightly increased from previous CT. Anasarca.  - Per IR, In the absences of any active extravasation on CTA there's no place to target for an embolization  - GIB now stabilized, surgery continues to monitor. If significant recurrence of UGIB, surgery may consider emergent open duodenotomy   - MICU managing antibiotics, on meropenem given fevers.   - will recommend octreotide 250 mcg TID    3. C. diff diarrhea  - on isolation, and abx      Christen Rosales NP  Hematology/ Oncology  [facility]  [phone] (office)  [phone] (alt office)  Evenings and weekends please call MD on call or office

## 2024-07-28 NOTE — PROGRESS NOTE ADULT - SUBJECTIVE AND OBJECTIVE BOX
Jodie Okeefe MD   Medicine Resident Physician, PGY-1  ---------------------------------------------------------------------------------------------  Patient is a 78y old  Male who presents with a chief complaint of hypotension (27 Jul 2024 14:15)      HPI:  79 yo male with PMH of CAD s/p PCI x3 with most recent stent 6/12/24 on Plavix, chronic Afib on eliquis, orthostatic hypotension on midodrine, PAD, 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 Patient states since discharge on this past Saturday, he continued to feel ongoing generalized weakness and dizziness with positional changes despite compliance with home midodrine 15mg TID and holding torsemide as instructed. Admits to poor PO intake of fluids/solute due to ongoing issues with poor appetite and nausea with meals which is not new. Denies any fever, chills, chest pain, SOB, cough, abd pain, dysuria nor urinary frequency. Has chronic diarrhea that is unchanged from his baseline.     In the ED, vitals notable for SBP 92-11s. Labs notable for elevated BUN/Cr 31/1.59 (from 30/1.18 on day of discharge 6/29/24). CXR with clear lungs. Abd US with innumerable intrahepatic echogenic masses consistent with known metastatic neuroendocrine tumor. Admitted to medicine for symptomatic hypotension and RAZIA. (02 Jul 2024 18:34)      SUBJECTIVE / OVERNIGHT EVENTS:  Had an episode of melanotic stool, CBC was okay. Added Metolazone for diuresis        MEDICATIONS  (STANDING):  aMIOdarone    Tablet 200 milliGRAM(s) Oral daily  aMIOdarone    Tablet   Oral   atorvastatin 80 milliGRAM(s) Oral at bedtime  buMETAnide Injectable 1 milliGRAM(s) IV Push two times a day  chlorhexidine 2% Cloths 1 Application(s) Topical <User Schedule>  chlorhexidine 4% Liquid 1 Application(s) Topical <User Schedule>  digoxin     Tablet 125 MICROGram(s) Oral daily  doxazosin 2 milliGRAM(s) Oral at bedtime  droxidopa 300 milliGRAM(s) Oral every 8 hours  folic acid Injectable 1 milliGRAM(s) IV Push daily  hydrocortisone sodium succinate Injectable 50 milliGRAM(s) IV Push every 12 hours  insulin lispro (ADMELOG) corrective regimen sliding scale   SubCutaneous every 6 hours  metolazone 5 milliGRAM(s) Oral daily  midodrine 30 milliGRAM(s) Oral every 8 hours  multivitamin 1 Tablet(s) Oral daily  pantoprazole  Injectable 40 milliGRAM(s) IV Push every 12 hours  sodium bicarbonate 650 milliGRAM(s) Oral three times a day  thiamine Injectable 100 milliGRAM(s) IV Push daily  vasopressin Infusion 0.04 Unit(s)/Min (6 mL/Hr) IV Continuous <Continuous>    MEDICATIONS  (PRN):  sodium chloride 0.9% lock flush 10 milliLiter(s) IV Push every 1 hour PRN Pre/post blood products, medications, blood draw, and to maintain line patency    Allergies    No Known Allergies    Intolerances        ICU Vital Signs Last 24 Hrs  T(F): 98.8 (27 Jul 2024 12:00), Max: 98.8 (27 Jul 2024 12:00)  HR: 100 (27 Jul 2024 16:00) (72 - 160)  BP: --  BP(mean): --  ABP: 109/54 (27 Jul 2024 16:00) (92/41 - 126/52)  ABP(mean): 73 (27 Jul 2024 16:00) (56 - 79)  RR: 19 (27 Jul 2024 16:00) (14 - 36)  SpO2: 100% (27 Jul 2024 16:00) (94% - 100%)      Physical Exam:   GENERAL: NAD, lying in bed, awake  HEAD:  Atraumatic, Normocephalic  EYES: EOMI, PERRLA, conjunctiva and sclera clear  CHEST/LUNG: Breath sounds bilaterally. Unlabored respirations  HEART: Irregular rhythm, no longer tachycardic  ABDOMEN: Soft, Nontender, Slight distension  EXTREMITIES: Bilateral edema of upper and lower extremities  NERVOUS SYSTEM: Speech clear, no focal deficits, AOx1-2 (self, [facility])      I&O's Summary    26 Jul 2024 07:01  -  27 Jul 2024 07:00  --------------------------------------------------------  IN: 2730.6 mL / OUT: 3570 mL / NET: -839.4 mL    27 Jul 2024 07:01  -  27 Jul 2024 18:59  --------------------------------------------------------  IN: 762 mL / OUT: 1445 mL / NET: -683 mL        LABS:                        7.8    8.49  )-----------( 102      ( 27 Jul 2024 12:53 )             23.7     07-27    147<H>  |  114<H>  |  43<H>  ----------------------------<  311<H>  3.7   |  21<L>  |  0.79    Ca    7.6<L>      27 Jul 2024 12:53  Phos  3.2     07-27  Mg     1.7     07-27    TPro  4.3<L>  /  Alb  2.3<L>  /  TBili  0.6  /  DBili  x   /  AST  76<H>  /  ALT  37  /  AlkPhos  266<H>  07-27    PT/INR - ( 27 Jul 2024 00:50 )   PT: 11.1 sec;   INR: 1.06 ratio         PTT - ( 27 Jul 2024 00:50 )  PTT:24.5 sec  ABG - ( 27 Jul 2024 00:19 )  pH, Arterial: 7.47  pH, Blood: x     /  pCO2: 33    /  pO2: 100   / HCO3: 24    / Base Excess: 0.6   /  SaO2: 98.3                  Urinalysis Basic - ( 27 Jul 2024 12:53 )    Color: x / Appearance: x / SG: x / pH: x  Gluc: 311 mg/dL / Ketone: x  / Bili: x / Urobili: x   Blood: x / Protein: x / Nitrite: x   Leuk Esterase: x / RBC: x / WBC x   Sq Epi: x / Non Sq Epi: x / Bacteria: x      CARDIAC MARKERS ( 26 Jul 2024 11:28 )  x     / x     / 152 U/L / x     / 3.0 ng/mL      CAPILLARY BLOOD GLUCOSE      POCT Blood Glucose.: 195 mg/dL (27 Jul 2024 17:48)  POCT Blood Glucose.: 283 mg/dL (27 Jul 2024 12:16)  POCT Blood Glucose.: 246 mg/dL (27 Jul 2024 06:15)  POCT Blood Glucose.: 223 mg/dL (27 Jul 2024 01:39)      RADIOLOGY & ADDITIONAL TESTS:  Results Reviewed:   Imaging Personally Reviewed:  Electrocardiogram Personally Reviewed: Jodie Okeefe MD   Medicine Resident Physician, PGY-1  ---------------------------------------------------------------------------------------------  Patient is a 78y old  Male who presents with a chief complaint of hypotension (27 Jul 2024 14:15)      HPI:  77 yo male with PMH of CAD s/p PCI x3 with most recent stent 6/12/24 on Plavix, chronic Afib on eliquis, orthostatic hypotension on midodrine, PAD, 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 Patient states since discharge on this past Saturday, he continued to feel ongoing generalized weakness and dizziness with positional changes despite compliance with home midodrine 15mg TID and holding torsemide as instructed. Admits to poor PO intake of fluids/solute due to ongoing issues with poor appetite and nausea with meals which is not new. Denies any fever, chills, chest pain, SOB, cough, abd pain, dysuria nor urinary frequency. Has chronic diarrhea that is unchanged from his baseline.     In the ED, vitals notable for SBP 92-11s. Labs notable for elevated BUN/Cr 31/1.59 (from 30/1.18 on day of discharge 6/29/24). CXR with clear lungs. Abd US with innumerable intrahepatic echogenic masses consistent with known metastatic neuroendocrine tumor. Admitted to medicine for symptomatic hypotension and RAZIA. (02 Jul 2024 18:34)      SUBJECTIVE / OVERNIGHT EVENTS:  Overnight, patient pulled his NG tube. Because he could not get PO midodrine and droxidopa, ady was restarted. Patient had a medium and small melena overnight. Since Hgb 7.7, ordered 1 pRBC.    Today, patient examined at bedside. Patient endorses no pain. He has no complaints.    Vaso 0.04  Ady 0.2      MEDICATIONS  (STANDING):  aMIOdarone    Tablet 200 milliGRAM(s) Oral daily  aMIOdarone    Tablet   Oral   atorvastatin 80 milliGRAM(s) Oral at bedtime  buMETAnide Injectable 1 milliGRAM(s) IV Push two times a day  chlorhexidine 2% Cloths 1 Application(s) Topical <User Schedule>  chlorhexidine 4% Liquid 1 Application(s) Topical <User Schedule>  digoxin     Tablet 125 MICROGram(s) Oral daily  doxazosin 2 milliGRAM(s) Oral at bedtime  droxidopa 300 milliGRAM(s) Oral every 8 hours  folic acid Injectable 1 milliGRAM(s) IV Push daily  hydrocortisone sodium succinate Injectable 50 milliGRAM(s) IV Push every 12 hours  insulin lispro (ADMELOG) corrective regimen sliding scale   SubCutaneous every 6 hours  metolazone 5 milliGRAM(s) Oral daily  midodrine 30 milliGRAM(s) Oral every 8 hours  multivitamin 1 Tablet(s) Oral daily  pantoprazole  Injectable 40 milliGRAM(s) IV Push every 12 hours  sodium bicarbonate 650 milliGRAM(s) Oral three times a day  thiamine Injectable 100 milliGRAM(s) IV Push daily  vasopressin Infusion 0.04 Unit(s)/Min (6 mL/Hr) IV Continuous <Continuous>    MEDICATIONS  (PRN):  sodium chloride 0.9% lock flush 10 milliLiter(s) IV Push every 1 hour PRN Pre/post blood products, medications, blood draw, and to maintain line patency    Allergies    No Known Allergies    Intolerances    ICU Vital Signs Last 24 Hrs  T(F): 98.8 (27 Jul 2024 12:00), Max: 98.8 (27 Jul 2024 12:00)  HR: 100 (27 Jul 2024 16:00) (72 - 160)  BP: --  BP(mean): --  ABP: 109/54 (27 Jul 2024 16:00) (92/41 - 126/52)  ABP(mean): 73 (27 Jul 2024 16:00) (56 - 79)  RR: 19 (27 Jul 2024 16:00) (14 - 36)  SpO2: 100% (27 Jul 2024 16:00) (94% - 100%)      Physical Exam:   GENERAL: NAD, lying in bed, awake, no NG tube  HEAD:  Atraumatic, Normocephalic  EYES: EOMI, PERRLA, conjunctiva and sclera clear  CHEST/LUNG: Breath sounds bilaterally. Unlabored respirations, cough  HEART: Irregular rate and rhythm  ABDOMEN: Soft, Nontender, Slight distension  EXTREMITIES: Bilateral edema of upper and lower extremities  NERVOUS SYSTEM: Speech clear, no focal deficits, AOx1-2  EXTREMITIES: cyanotic distal lower extremities, similar to prior, seems worsened in right hand      I&O's Summary    26 Jul 2024 07:01  -  27 Jul 2024 07:00  --------------------------------------------------------  IN: 2730.6 mL / OUT: 3570 mL / NET: -839.4 mL    27 Jul 2024 07:01  -  27 Jul 2024 18:59  --------------------------------------------------------  IN: 762 mL / OUT: 1445 mL / NET: -683 mL        LABS:                        7.8    8.49  )-----------( 102      ( 27 Jul 2024 12:53 )             23.7     07-27    147<H>  |  114<H>  |  43<H>  ----------------------------<  311<H>  3.7   |  21<L>  |  0.79    Ca    7.6<L>      27 Jul 2024 12:53  Phos  3.2     07-27  Mg     1.7     07-27    TPro  4.3<L>  /  Alb  2.3<L>  /  TBili  0.6  /  DBili  x   /  AST  76<H>  /  ALT  37  /  AlkPhos  266<H>  07-27    PT/INR - ( 27 Jul 2024 00:50 )   PT: 11.1 sec;   INR: 1.06 ratio         PTT - ( 27 Jul 2024 00:50 )  PTT:24.5 sec  ABG - ( 27 Jul 2024 00:19 )  pH, Arterial: 7.47  pH, Blood: x     /  pCO2: 33    /  pO2: 100   / HCO3: 24    / Base Excess: 0.6   /  SaO2: 98.3                  Urinalysis Basic - ( 27 Jul 2024 12:53 )    Color: x / Appearance: x / SG: x / pH: x  Gluc: 311 mg/dL / Ketone: x  / Bili: x / Urobili: x   Blood: x / Protein: x / Nitrite: x   Leuk Esterase: x / RBC: x / WBC x   Sq Epi: x / Non Sq Epi: x / Bacteria: x      CARDIAC MARKERS ( 26 Jul 2024 11:28 )  x     / x     / 152 U/L / x     / 3.0 ng/mL      CAPILLARY BLOOD GLUCOSE      POCT Blood Glucose.: 195 mg/dL (27 Jul 2024 17:48)  POCT Blood Glucose.: 283 mg/dL (27 Jul 2024 12:16)  POCT Blood Glucose.: 246 mg/dL (27 Jul 2024 06:15)  POCT Blood Glucose.: 223 mg/dL (27 Jul 2024 01:39)      RADIOLOGY & ADDITIONAL TESTS:  Results Reviewed:   Imaging Personally Reviewed:  Electrocardiogram Personally Reviewed: Jodie Okeefe MD   Medicine Resident Physician, PGY-1  ---------------------------------------------------------------------------------------------  Patient is a 78y old  Male who presents with a chief complaint of hypotension (27 Jul 2024 14:15)      HPI:  77 yo male with PMH of CAD s/p PCI x3 with most recent stent 6/12/24 on Plavix, chronic Afib on eliquis, orthostatic hypotension on midodrine, PAD, 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 Patient states since discharge on this past Saturday, he continued to feel ongoing generalized weakness and dizziness with positional changes despite compliance with home midodrine 15mg TID and holding torsemide as instructed. Admits to poor PO intake of fluids/solute due to ongoing issues with poor appetite and nausea with meals which is not new. Denies any fever, chills, chest pain, SOB, cough, abd pain, dysuria nor urinary frequency. Has chronic diarrhea that is unchanged from his baseline.     In the ED, vitals notable for SBP 92-11s. Labs notable for elevated BUN/Cr 31/1.59 (from 30/1.18 on day of discharge 6/29/24). CXR with clear lungs. Abd US with innumerable intrahepatic echogenic masses consistent with known metastatic neuroendocrine tumor. Admitted to medicine for symptomatic hypotension and RAZIA. (02 Jul 2024 18:34)      SUBJECTIVE / OVERNIGHT EVENTS:  Overnight, patient pulled his NG tube. Because he could not get PO midodrine and droxidopa, ady was restarted. Patient had a medium and small melena overnight. Since Hgb 7.7, ordered 1 pRBC.    Today, patient examined at bedside. Patient endorses no pain. He has no complaints.    Vaso 0.04  Ady 0.2    Vitals:  Afebrile (Tmax 99.1)  HR 90s-120s  /50 (SBP mostly >100)  RR 20-24  SpO2 100% RA    MEDICATIONS  (STANDING):  aMIOdarone    Tablet 200 milliGRAM(s) Oral daily  aMIOdarone    Tablet   Oral   atorvastatin 80 milliGRAM(s) Oral at bedtime  buMETAnide Injectable 1 milliGRAM(s) IV Push two times a day  chlorhexidine 2% Cloths 1 Application(s) Topical <User Schedule>  chlorhexidine 4% Liquid 1 Application(s) Topical <User Schedule>  digoxin     Tablet 125 MICROGram(s) Oral daily  doxazosin 2 milliGRAM(s) Oral at bedtime  droxidopa 300 milliGRAM(s) Oral every 8 hours  folic acid Injectable 1 milliGRAM(s) IV Push daily  hydrocortisone sodium succinate Injectable 50 milliGRAM(s) IV Push every 12 hours  insulin lispro (ADMELOG) corrective regimen sliding scale   SubCutaneous every 6 hours  metolazone 5 milliGRAM(s) Oral daily  midodrine 30 milliGRAM(s) Oral every 8 hours  multivitamin 1 Tablet(s) Oral daily  pantoprazole  Injectable 40 milliGRAM(s) IV Push every 12 hours  sodium bicarbonate 650 milliGRAM(s) Oral three times a day  thiamine Injectable 100 milliGRAM(s) IV Push daily  vasopressin Infusion 0.04 Unit(s)/Min (6 mL/Hr) IV Continuous <Continuous>    MEDICATIONS  (PRN):  sodium chloride 0.9% lock flush 10 milliLiter(s) IV Push every 1 hour PRN Pre/post blood products, medications, blood draw, and to maintain line patency    Allergies    No Known Allergies    Intolerances    ICU Vital Signs Last 24 Hrs  T(F): 98.8 (27 Jul 2024 12:00), Max: 98.8 (27 Jul 2024 12:00)  HR: 100 (27 Jul 2024 16:00) (72 - 160)  BP: --  BP(mean): --  ABP: 109/54 (27 Jul 2024 16:00) (92/41 - 126/52)  ABP(mean): 73 (27 Jul 2024 16:00) (56 - 79)  RR: 19 (27 Jul 2024 16:00) (14 - 36)  SpO2: 100% (27 Jul 2024 16:00) (94% - 100%)      Physical Exam:   GENERAL: NAD, lying in bed, awake, no NG tube  HEAD:  Atraumatic, Normocephalic  EYES: EOMI, PERRLA, conjunctiva and sclera clear  CHEST/LUNG: Breath sounds bilaterally. Unlabored respirations, cough  HEART: Irregular rate and rhythm  ABDOMEN: Soft, Nontender, Slight distension  EXTREMITIES: Bilateral edema of upper and lower extremities  NERVOUS SYSTEM: Speech clear, no focal deficits, AOx1-2  EXTREMITIES: cyanotic distal lower extremities, similar to prior, seems worsened in right hand      I&O's Summary    26 Jul 2024 07:01  -  27 Jul 2024 07:00  --------------------------------------------------------  IN: 2730.6 mL / OUT: 3570 mL / NET: -839.4 mL    27 Jul 2024 07:01  -  27 Jul 2024 18:59  --------------------------------------------------------  IN: 762 mL / OUT: 1445 mL / NET: -683 mL        LABS:                        7.8    8.49  )-----------( 102      ( 27 Jul 2024 12:53 )             23.7     07-27    147<H>  |  114<H>  |  43<H>  ----------------------------<  311<H>  3.7   |  21<L>  |  0.79    Ca    7.6<L>      27 Jul 2024 12:53  Phos  3.2     07-27  Mg     1.7     07-27    TPro  4.3<L>  /  Alb  2.3<L>  /  TBili  0.6  /  DBili  x   /  AST  76<H>  /  ALT  37  /  AlkPhos  266<H>  07-27    PT/INR - ( 27 Jul 2024 00:50 )   PT: 11.1 sec;   INR: 1.06 ratio         PTT - ( 27 Jul 2024 00:50 )  PTT:24.5 sec  ABG - ( 27 Jul 2024 00:19 )  pH, Arterial: 7.47  pH, Blood: x     /  pCO2: 33    /  pO2: 100   / HCO3: 24    / Base Excess: 0.6   /  SaO2: 98.3                  Urinalysis Basic - ( 27 Jul 2024 12:53 )    Color: x / Appearance: x / SG: x / pH: x  Gluc: 311 mg/dL / Ketone: x  / Bili: x / Urobili: x   Blood: x / Protein: x / Nitrite: x   Leuk Esterase: x / RBC: x / WBC x   Sq Epi: x / Non Sq Epi: x / Bacteria: x      CARDIAC MARKERS ( 26 Jul 2024 11:28 )  x     / x     / 152 U/L / x     / 3.0 ng/mL      CAPILLARY BLOOD GLUCOSE      POCT Blood Glucose.: 195 mg/dL (27 Jul 2024 17:48)  POCT Blood Glucose.: 283 mg/dL (27 Jul 2024 12:16)  POCT Blood Glucose.: 246 mg/dL (27 Jul 2024 06:15)  POCT Blood Glucose.: 223 mg/dL (27 Jul 2024 01:39)      RADIOLOGY & ADDITIONAL TESTS:  Results Reviewed:   Imaging Personally Reviewed:  Electrocardiogram Personally Reviewed:

## 2024-07-28 NOTE — PROGRESS NOTE ADULT - ATTENDING COMMENTS
78M w/ CAD (recent stent 6/12/24), Afib, metastatic neuroendocrine pancreatic tumor. Admitted w/ hypotension, course complicated by ABLA and GIB. Undergone EGD #1 on 7/9 which showed uncontrolled bleeding, after which pt developed Afib w/ RVR and hemorrhagic shock, s/p IR for GDA embolization. EGD #2 on 7/12 showed oozing but no active bleeding. Course further complicated by Cdiff colitis. Had another episode of melena and ABLA on 7/18 s/p EGD #3 showed bleeding vessel s/p clipx3 and epi. Pt remains intubated post-procedure and has since been extubated. Continues to have intermittent melanotic stools, and in the last 36 hours has required 2 pRBCs, 1 plt and 1 FFP for continued melena and ABLA. Remains in shock state as well.     Awake and alert. Remains in shock state, etiology initially hemorrhagic now suspect more vasoplegic/distributive, pocus with small LV cavity and some late peaking In the setting of RVR. , unable to wean off vasopressin, and henny ripped his NGT out, will start Ammio ad consider iv bb/dig to keep HR slower to allow for filling. Attempted asa challenge but then bled one multiple attempts. At this point it unclear to me where his shock is coming from.   We need to readress GOC. He is also very clear and while he has poor insight into his disease and does nto techinally have capacity but focing and NGT into him when he is so alert and in a logical way asking us not to seems wrong and cruel. Will try to discuss with family for now will make do with out oral acces. PT allowed to have Sips of water.   Very poor prognosis, w ith no real improvement in his undifferentiated shock.

## 2024-07-28 NOTE — PROGRESS NOTE ADULT - ASSESSMENT
Assessment	  Assessment: 77 yo male with metastatic neuroendocrine pancreatic cancer (tx on hold) and PMH of CAD s/p PCI x3 with most recent stent 6/12/24 on Plavix and eliquis , chronic Afib on eliquis, orthostatic hypotension on midodrine, PAD , recent admission for dx cath on 6/24/24 presented from PCP's office for hypotension despite taking AM midodrine dose on 7/2, admitted to medicine for symptomatic hypotension and RAZIA. Per chart, on 7/8 PM, pt was noted to have acute onset of melena x5 and coffee ground emesis x2-3. RRT was called on 7/9 AM for hypotension, hgb dropped from 9.2 to 7.4 (admission baseline 10-11), FOBT +, pt was started on PPI IV and transfused 1 unit of pRBC. GI was consulted and underwent EGD on 7/9 afternoon. MICU was consulted for uncontrolled bleeding during EGD. During EGD, pt became hypotensive and was given phenylephrine, had A-fib with RVR s/p amiodarone. Now s/p IR GDA embolization, admitted to MICU for further moniring i.s.o hemorrhagic shock 2/2 GI bleed. On repeat EGD, oozing but no active bleeding was noted. Hospital course c/b c.diff, and patient has been placed on vancomycin. Patient had melanotic stool 7/18 with Hgb drop 10.1 to 7.1 and received 2 units pRBC and 1 unit platelets. Patient was reintubated and underwent EGD 7/18 afternoon. GI found duodenal bleed. Patient now s/p 3x clips and epi. Since 7/18 patient has continued to have melanotic stools with hemoglobin drops concerning for slow rebleeding.     - Total transfusions: 16 pRBC, 4 platelet, 5 FFP (as of 7/26)    Plan:     NEUROLOGIC  # Baseline AOx3 (waxing/waning)  Course:  - intubated 7/9, extubated 7/15  - reintubated 7/18 for EGD, extubated 7/22  -reintubated 7/23  - not sedated  - Currently AOx1-2  Plan:  - Delirium precautions    CARDIOVASCULAR  # hemorrhagic shock (improving)  Course:  - Per spouse, pt's baseline BP is a little bit over 100  - 7/9: RRT called 7/9 for hypotension; 2/2 to Upper GI bleeds, urgently took to EGD, found bleeding ulcer that was not well controlled, underwent IR GDA embolization  - 7/18, patient had large melanotic stool with Hgb drop from 10.1-7.1, patient was restarted on henny, vaso, and levo for reintubation for GI scope; s/p 2pRBC, 1 platelet  - Total transfusions: 16 pRBC, 4 platelet, 5 FFP  Findings:  - AM cortisol 17 (7/5), AM cortisol 37.1 (7/11)  - POCUS ECHO (7/16) showed no cardiogenic shock, no tamponade, low SV indeterminate IVC, irregular B lines but not concerning for pulmonary edema, and some subdiaphragmatic fluid  Plan:  - Continue to monitor CBC  - Transfuse for Hgb <8, (goal given recent stent)  - Continue droxidopa 300mg q8 and midodrine 30 mg q8  - Try to wean off vaso; continue to monitor off pressors with goal SBP > 100  - Continue to hold Plavix 75 mg  - Taper hydrocortisone to 50mg bid for refractory shock    #CAD s/p PCI x3, most recent stent 6/12/24, NURIA to pLAD (stable)  Course:  - 7/13 restarted plavix for new stent (6/12/24) per GI, 7/18 held plavix due to bleeding  - 7/22 started low-dose aspirin as safer alternative to plavix per cardiology, but d/c 7/23 as patient bled again  Plan:  - Continue to hold Plavix 75 mg and aspirin 81 mg  - Continue atorvastatin 80 mg daily    #PAD  # A-fib on eliquis (improving)  May have been worsened because of GI bleed  - s/p successful DCCV 10/31   - 7/16 In RVR, 2 doses of amio overnight; Cards recommended resuming home dose sotalol 40 mg PO (qTC wnl), s/p 1 dose sotalol but RVR persisted  - 7/16 Started amio loading  - 7/22 Added digoxin load for persistent HR 130s over past week  - 7/23 Dig level 1.3, patient's HR back in 60s in the afternoon  Plan:  - Hold eliquis   - Continue amio 200 mg daily  - Continue digoxin maintenance dose 125 mcg daily  - Hold home sotalol  - Cardiology following, advises against chemical cardioversion off AC if possible    #NSVT (worsening)  Likely 2/2 cardiac structural damage given prior stents as well as electrolyte abnormalities while being diuresed. Troponins elevated but stable today. Troponins also elevated earlier in admission. EKG without ST elevations.  Plan:  - Continue to monitor  - Replete electrolytes as necessary    PULMONARY  #Intubated for airway protection prior to EGD/IR embolization (resolved)  Course:  - Extubated 7/22, SpO2 100% on RA, reintubated 7/27    RENAL/  #RAZIA (resolved)  - Cr peaked at 3.38 (7/11)  - Currently Cr 1.13 ( 7/24)  Plan:  - Continue to monitor Cr    #ATN i.s.o hypotension  #metabolic acidosis (improving)  Patient was in a state of metabolic acidosis around 7/16, but recent ABG pH in normal range. However, patient has been bicarb deficient throughout the admission.   Plan:  - f/u nephro recs  - Continue bicarb 650mg tid considering hypernatremia  - Per nephro, patient is not a dialysis candidate  - trend BUN/Cr   - monitor Is and Os     #Hypernatremia (improving)  Patient having a gradually increasing sodium since transfer to the MICU. Current Na improving by adding free water, lowering the bicarb drip, and diuresis with bumex/metolazone.  Plan:  - Continue free water 300mL q6  - Continue lower dose sodium bicarb 650 mg tid as it may be a contributing factor  - Continue bumex 1mg q12 IV  - Continue metolazone 5mg PO daily    #Urinary retention (improving)  Plan:  - Started Doxazosin 2 mg daily (7/15)  - Monitor I/Os  - Ivory placed by urology 7/19, making urine    GASTROINTESTINAL  #Upper GI Bleed (stable)  Course:  - 7/9 CT A/P - Active bleeding in the third portion of the duodenum. Progression of liver metastases.  - 7/9 EGD showed esophagitis, One non-bleeding duodenal ulcer with a clean ulcer base (Arjun Class III). One oozing duodenal ulcer with spurting hemorrhage (Arjun Class Ia). Treatment not successful. Treated with bipolar cautery.  - 7/9 s/p IR GDA embolization  - 7/18 Patient had melanotic stool with hemoglobin drop 10.1 to 7.1; EGD showing duodenal bleed s/p 3 clips + epi  - CTA (7/20) showed no active bleed  - H. pylori negative  - Total EGDs: 3  Plan:  - Continue pantoprazole 40 mg IV q12  - monitor Hgb, transfuse as needed for Hgb <8  - continue to f/u with GI and IR     - Per GI, "Will need PPI BID for 8 weeks for grade D esophagitis and PUD"    - Per GI, high risk of 30 day rebleed (>10-20% risk)  - No acute IR intervention per IR because no active area of extravasation on imaging (7/21)  - No acute GI intervention per GI (7/21)  - Surgery evaluated patient 7/23, may be a candidate for duodenotomy to oversew ulcers, but patient cannot be on aspirin  - Reached out to GI (7/26) about longer-term goals including risk of not being on aspirin vs bleeding;  ·	Per GI, still a risks vs benefits situation as he may bleed again with ASA, patient may require a surgical intervention next    #Nutrition (improving)  Course:  - FEES (7/17) failed  - ENT consulted for vallecular lesion, diagnosed vallecular cyst, no inpatient intervention required, outpatient f/u  - Patient currently extubated with NG tube (7/24)  Plan:  - Tube feeds per RD, added multivitamin  - Plan for FEES Monday 7/29 per speech and swallow    #Diarrhea (resolved)  Course:  - c. diff + (7/14)  - vancomycin (7/14 -7/23) active infection dose, vancomycin prophylaxis (7/24-7/25)   Plan:  - No longer on vancomycin    #Transaminitis (improving)  Findings:  - Bili 1.1 (7/16) -> 1.3 (7/17) ->0.5 (7/24)  - Alk P 359 (7/16)-> 484 (7/17) -> 174 (7/24)  -  (7/16)->129 (7/17) -> 75 (7/24)  - ALT 67 (7/16)-> 72 (7/17) -> 30 (7/24)  - RUQ US (7/17): known liver mets, no acute findings  Plan:  - Continue atorvastatin 80 mg daily    INFECTIOUS DISEASE  #leukocytosis (resolved)  Cultures:  - Blood cultures negative throughout admission  - Sputum Cx from ETT 7/13 showed numerous gram neg krishna (Klebsiella oxytoca/raoultella ornithinolytica)  - GI PCR negative  - MRSA swab negative  Antibiotics Course:  - Vancomycin (7/14 - 7/23), Vancomycin Prophylaxis (7/24 -7/25)  - Zosyn (7/11-7/18), resistance shown, switched to meropenem  - Meropenem (7/18-7/25)  Plan:  - No longer on antibiotics  - Continue to monitor for fevers    ENDOCRINE  #adrenal insufficiency (stable)  Findings:  -7/5 cortisol 17   - 7/11 cortisol 37.1   Plan:  - Taper hydrocortisone to 50mg bid  - Monitor sugars with steroid    HEMATOLOGY/ONCOLOGY   # neuroendocrine tumor (unchanged)  - was on lanreotide then had POD in liver and started on peptide receptor radiotherapy (PRRT)- typically given every 8 weeks for 4 doses. He last received it 10/20/2023   - PET 5/28/24 shows new somatostatin avid osseous lesions; decreased intensely avid right supradiaphragmatic and upper abdominal nodes, suspicious for mets  - Gastrin level 5509  Plan:  - per heme/onc:    - can resume octreotide 150 mcg bid once infection r/o      - can check factor levels V, VIII, X     - "-- [place] with [place]. Sophia Pras[place] [facility] [place] no plan for treatment inpatient, if clinically improves/stabilizes then can be considered for treatment outpatient"    #Normocytic anemia (stable)  - Pt with low Hgb 7-9  - ISO of GI bleed hx and continued melanotic BMs, there is c/o of rebleed; per GI, high risk of rebleeding, currently seems to be slow bleeding   Plan:  - Surgery and GI made aware & are following   - Continue CBC to q12 unless having large melanotic stools    #DVT ppx (unchanged)  - SCDs  - LE duplex (7/17): negative for DVT    SKINS:   - Lines/Tubes - PIV, cordis replaced with central line (7/14-7/23), A line (7/20), Subclavian central line (7/24- )    Ethics:   - Full Code   - GOC 7/12, spoke to spouse (Yamilet) over phone with palliative care team, discussed GOC again on 7/16    Consults this admission: GI, Heme Onc, Palliative, Endocrinology, Cardiology, Nephrology, Interventional Radiology, General Surgery   Assessment	  Assessment: 79 yo male with metastatic neuroendocrine pancreatic cancer (tx on hold) and PMH of CAD s/p PCI x3 with most recent stent 6/12/24 on Plavix and eliquis , chronic Afib on eliquis, orthostatic hypotension on midodrine, PAD , recent admission for dx cath on 6/24/24 presented from PCP's office for hypotension despite taking AM midodrine dose on 7/2, admitted to medicine for symptomatic hypotension and RAZIA. Per chart, on 7/8 PM, pt was noted to have acute onset of melena x5 and coffee ground emesis x2-3. RRT was called on 7/9 AM for hypotension, hgb dropped from 9.2 to 7.4 (admission baseline 10-11), FOBT +, pt was started on PPI IV and transfused 1 unit of pRBC. GI was consulted and underwent EGD on 7/9 afternoon. MICU was consulted for uncontrolled bleeding during EGD. During EGD, pt became hypotensive and was given phenylephrine, had A-fib with RVR s/p amiodarone. Now s/p IR GDA embolization, admitted to MICU for further moniring i.s.o hemorrhagic shock 2/2 GI bleed. On repeat EGD, oozing but no active bleeding was noted. Hospital course c/b c.diff, and patient has been placed on vancomycin. Patient had melanotic stool 7/18 with Hgb drop 10.1 to 7.1 and received 2 units pRBC and 1 unit platelets. Patient was reintubated and underwent EGD 7/18 afternoon. GI found duodenal bleed. Patient now s/p 3x clips and epi. Since 7/18 patient has continued to have melanotic stools with hemoglobin drops concerning for slow rebleeding.     - Total transfusions: 17 pRBC, 4 platelet, 5 FFP (as of 7/28)    Plan:     NEUROLOGIC  # Baseline AOx3 (waxing/waning)  Course:  - intubated 7/9, extubated 7/15  - reintubated 7/18 for EGD, extubated 7/22  -reintubated 7/23  - not sedated  - Currently AOx1-2  Plan:  - Delirium precautions    CARDIOVASCULAR  # hemorrhagic shock (worsened)  Course:  - Per spouse, pt's baseline BP is a little bit over 100  - 7/9: RRT called 7/9 for hypotension; 2/2 to Upper GI bleeds, urgently took to EGD, found bleeding ulcer that was not well controlled, underwent IR GDA embolization  - 7/18, patient had large melanotic stool with Hgb drop from 10.1-7.1, patient was restarted on henny, vaso, and levo for reintubation for GI scope; s/p 2pRBC, 1 platelet  - Total transfusions: 16 pRBC, 4 platelet, 5 FFP  Findings:  - AM cortisol 17 (7/5), AM cortisol 37.1 (7/11)  - POCUS ECHO (7/16) showed no cardiogenic shock, no tamponade, low SV indeterminate IVC, irregular B lines but not concerning for pulmonary edema, and some subdiaphragmatic fluid  Plan:  - Currently worsened as patient has pulled NG tube, unable to be given droxidopa and midodrine  - Continue to monitor CBC  - Transfuse for Hgb <8, (goal given recent stent)  - Continue droxidopa 300mg q8 and midodrine 30 mg q8 once NG tube replaced  - Continue vaso and henny, try to wean once NG tube in place; goal SBP > 100 due to wide pulse pressure  - Continue to hold Plavix 75 mg  - Continue hydrocortisone 50mg bid for refractory shock    #CAD s/p PCI x3, most recent stent 6/12/24, NURIA to pLAD (stable)  Course:  - 7/13 restarted plavix for new stent (6/12/24) per GI, 7/18 held plavix due to bleeding  - 7/22 started low-dose aspirin as safer alternative to plavix per cardiology, but d/c 7/23 as patient bled again  Plan:  - Continue to hold Plavix 75 mg and aspirin 81 mg  - Continue atorvastatin 80 mg daily    #PAD  # A-fib on eliquis (worsening)  May have been worsened because of GI bleed  - s/p successful DCCV 10/31   - 7/16 In RVR, 2 doses of amio overnight; Cards recommended resuming home dose sotalol 40 mg PO (qTC wnl), s/p 1 dose sotalol but RVR persisted  - 7/16 Started amio loading  - 7/22 Added digoxin load for persistent HR 130s over past week  - 7/23 Dig level 1.3, patient's HR back in 60s in the afternoon  - 7/28 Patient pulled NG tube, unable to get several doses of amio, back in afib with RVR  Plan:  - Hold eliquis   - Continue amio 200 mg daily once able to put in NG tube  - Continue digoxin maintenance dose 125 mcg daily  - Hold home sotalol  - Cardiology following, advises against chemical cardioversion off AC if possible    #NSVT (stable)  Likely 2/2 cardiac structural damage given prior stents as well as electrolyte abnormalities while being diuresed. Troponins elevated but stable today. Troponins also elevated earlier in admission. EKG without ST elevations.  Plan:  - Continue to monitor  - Replete electrolytes as necessary    PULMONARY  #Intubated for airway protection prior to EGD/IR embolization (resolved)  Course:  - Extubated 7/22, SpO2 100% on RA, reintubated 7/27    RENAL/  #RAZIA (resolved)  - Cr peaked at 3.38 (7/11)  - Currently Cr 1.13 ( 7/24)  Plan:  - Continue to monitor Cr    #ATN i.s.o hypotension  #metabolic acidosis (improving)  Patient was in a state of metabolic acidosis around 7/16, but recent ABG pH in normal range. However, patient has been bicarb deficient throughout the admission.   Plan:  - f/u nephro recs  - Continue bicarb 650mg tid considering hypernatremia  - Per nephro, patient is not a dialysis candidate  - trend BUN/Cr   - monitor Is and Os     #Hypernatremia (worsened)  Patient having a gradually increasing sodium since transfer to the MICU. Na was improving by adding free water, lowering the bicarb drip, and diuresis with bumex/metolazone. However, back to 149 today.  Plan:  - Continue free water 300mL q6  - Continue lower dose sodium bicarb 650 mg tid as it may be a contributing factor  - Continue bumex 1mg q12 IV  - Continue metolazone 5mg PO daily, but consider diuril if NG tube not replaced    #Urinary retention (improving)  Plan:  - Started Doxazosin 2 mg daily (7/15)  - Monitor I/Os  - Ivory placed by urology 7/19, making urine    GASTROINTESTINAL  #Upper GI Bleed (stable)  Course:  - 7/9 CT A/P - Active bleeding in the third portion of the duodenum. Progression of liver metastases.  - 7/9 EGD showed esophagitis, One non-bleeding duodenal ulcer with a clean ulcer base (Arjun Class III). One oozing duodenal ulcer with spurting hemorrhage (Arjun Class Ia). Treatment not successful. Treated with bipolar cautery.  - 7/9 s/p IR GDA embolization  - 7/18 Patient had melanotic stool with hemoglobin drop 10.1 to 7.1; EGD showing duodenal bleed s/p 3 clips + epi  - CTA (7/20) showed no active bleed  - H. pylori negative  - Total EGDs: 3  Plan:  - Continue pantoprazole 40 mg IV q12  - monitor Hgb, transfuse as needed for Hgb <8  - continue to f/u with GI and IR     - Per GI, "Will need PPI BID for 8 weeks for grade D esophagitis and PUD"    - Per GI, high risk of 30 day rebleed (>10-20% risk)  - No acute IR intervention per IR because no active area of extravasation on imaging (7/21)  - No acute GI intervention per GI (7/21)  - Surgery evaluated patient 7/23, may be a candidate for duodenotomy to oversew ulcers, but patient cannot be on aspirin  - Reached out to GI (7/26) about longer-term goals including risk of not being on aspirin vs bleeding;  ·	Per GI, still a risks vs benefits situation as he may bleed again with ASA, patient may require a surgical intervention next    #Nutrition (worsened)  Course:  - FEES (7/17) failed  - ENT consulted for vallecular lesion, diagnosed vallecular cyst, no inpatient intervention required, outpatient f/u  - Patient currently extubated with NG tube (7/24)  Plan:  - Tube feeds per RD, added multivitamin if NG tube replaced  - Plan for FEES Monday 7/29 per speech and swallow    #Diarrhea (resolved)  Course:  - c. diff + (7/14)  - vancomycin (7/14 -7/23) active infection dose, vancomycin prophylaxis (7/24-7/25)   Plan:  - No longer on vancomycin    #Transaminitis (worsened)  Findings:  - Bili 1.1 (7/16) -> 1.3 (7/17) ->0.5 (7/24) -> 0.6 (7/28)  - Alk P 359 (7/16)-> 484 (7/17) -> 174 (7/24) -> 229 (7/28)  -  (7/16)->129 (7/17) -> 75 (7/24) -> 65 (7/28)  - ALT 67 (7/16)-> 72 (7/17) -> 30 (7/24) -> 229 (7/28)  - RUQ US (7/17): known liver mets, no acute findings  Plan:  - Continue atorvastatin 80 mg daily    INFECTIOUS DISEASE  #leukocytosis (resolved)  Cultures:  - Blood cultures negative throughout admission  - Sputum Cx from ETT 7/13 showed numerous gram neg krishna (Klebsiella oxytoca/raoultella ornithinolytica)  - GI PCR negative  - MRSA swab negative  Antibiotics Course:  - Vancomycin (7/14 - 7/23), Vancomycin Prophylaxis (7/24 -7/25)  - Zosyn (7/11-7/18), resistance shown, switched to meropenem  - Meropenem (7/18-7/25)  Plan:  - No longer on antibiotics  - Continue to monitor for fevers    ENDOCRINE  #adrenal insufficiency (stable)  Findings:  -7/5 cortisol 17   - 7/11 cortisol 37.1   Plan:  - Taper hydrocortisone to 50mg bid  - Monitor sugars with steroid    HEMATOLOGY/ONCOLOGY   # neuroendocrine tumor (unchanged)  - was on lanreotide then had POD in liver and started on peptide receptor radiotherapy (PRRT)- typically given every 8 weeks for 4 doses. He last received it 10/20/2023   - PET 5/28/24 shows new somatostatin avid osseous lesions; decreased intensely avid right supradiaphragmatic and upper abdominal nodes, suspicious for mets  - Gastrin level 5509  Plan:  - per heme/onc:    - can resume octreotide 150 mcg bid once infection r/o      - can check factor levels V, VIII, X     - "-- f/u with Dr. Sophia Prasad at [facility] [place] after discharge, no plan for treatment inpatient, if clinically improves/stabilizes then can be considered for treatment outpatient"    #Normocytic anemia (stable)  - Pt with low Hgb 7-9  - ISO of GI bleed hx and continued melanotic BMs, there is c/o of rebleed; per GI, high risk of rebleeding, currently seems to be slow bleeding   Plan:  - Surgery and GI made aware & are following   - Continue CBC to q12 unless having large melanotic stools    #DVT ppx (unchanged)  - SCDs  - LE duplex (7/17): negative for DVT    SKINS:   - Lines/Tubes - PIV, cordis replaced with central line (7/14-7/23), A line (7/20), Subclavian central line (7/24- )    Ethics:   - Full Code   - GOC 7/12, spoke to spouse (Yamilet) over phone with palliative care team, discussed GOC again on 7/16    Consults this admission: GI, Heme Onc, Palliative, Endocrinology, Cardiology, Nephrology, Interventional Radiology, General Surgery   Assessment	  Assessment: 77 yo male with metastatic neuroendocrine pancreatic cancer (tx on hold) and PMH of CAD s/p PCI x3 with most recent stent 6/12/24 on Plavix and eliquis , chronic Afib on eliquis, orthostatic hypotension on midodrine, PAD , recent admission for dx cath on 6/24/24 presented from PCP's office for hypotension despite taking AM midodrine dose on 7/2, admitted to medicine for symptomatic hypotension and RAZIA. Per chart, on 7/8 PM, pt was noted to have acute onset of melena x5 and coffee ground emesis x2-3. RRT was called on 7/9 AM for hypotension, hgb dropped from 9.2 to 7.4 (admission baseline 10-11), FOBT +, pt was started on PPI IV and transfused 1 unit of pRBC. GI was consulted and underwent EGD on 7/9 afternoon. MICU was consulted for uncontrolled bleeding during EGD. During EGD, pt became hypotensive and was given phenylephrine, had A-fib with RVR s/p amiodarone. Now s/p IR GDA embolization, admitted to MICU for further monitoring i.s.o hemorrhagic shock 2/2 GI bleed. On repeat EGD, oozing but no active bleeding was noted. Hospital course c/b c.diff, and patient has been placed on vancomycin. Patient had melanotic stool 7/18 with Hgb drop 10.1 to 7.1 and received 2 units pRBC and 1 unit platelets. Patient was reintubated and underwent EGD 7/18 afternoon. GI found duodenal bleed. Patient now s/p 3x clips and epi. Since 7/18 patient has continued to have melanotic stools with hemoglobin drops concerning for slow rebleeding. Currently, discussion revolves around the risks and benefits of antiplatelet therapy for recent stent vs risk of GI rebleed, and potential to undergo surgery.    - Total transfusions: 17 pRBC, 4 platelet, 5 FFP (as of 7/28)    Plan:     NEUROLOGIC  # Baseline AOx3 (waxing/waning)  Course:  - intubated 7/9, extubated 7/15  - reintubated 7/18 for EGD, extubated 7/22  -reintubated 7/23  - not sedated  - Currently AOx1-2  Plan:  - Delirium precautions    CARDIOVASCULAR  # hemorrhagic shock (worsened)  Course:  - Per spouse, pt's baseline BP is a little bit over 100  - 7/9: RRT called 7/9 for hypotension; 2/2 to Upper GI bleeds, urgently took to EGD, found bleeding ulcer that was not well controlled, underwent IR GDA embolization  - 7/18, patient had large melanotic stool with Hgb drop from 10.1-7.1, patient was restarted on henny, vaso, and levo for reintubation for GI scope; s/p 2pRBC, 1 platelet  - Total transfusions: 16 pRBC, 4 platelet, 5 FFP  Findings:  - AM cortisol 17 (7/5), AM cortisol 37.1 (7/11)  - POCUS ECHO (7/16) showed no cardiogenic shock, no tamponade, low SV indeterminate IVC, irregular B lines but not concerning for pulmonary edema, and some subdiaphragmatic fluid  Plan:  - Currently worsened as patient has pulled NG tube, unable to be given droxidopa and midodrine  - Continue to monitor CBC  - Transfuse for Hgb <8, (goal given recent stent)  - Continue droxidopa 300mg q8 and midodrine 30 mg q8 if NG tube replaced  - Continue vaso and hneny, try to wean once NG tube in place; goal SBP > 100 due to wide pulse pressure  - Continue to hold Plavix 75 mg  - Continue hydrocortisone 50mg bid for refractory shock    #CAD s/p PCI x3, most recent stent 6/12/24, NURIA to pLAD (stable)  Course:  - 7/13 restarted plavix for new stent (6/12/24) per GI, 7/18 held plavix due to bleeding  - 7/22 started low-dose aspirin as safer alternative to plavix per cardiology, but d/c 7/23 as patient bled again  Plan:  - Continue to hold Plavix 75 mg and aspirin 81 mg  - Continue atorvastatin 80 mg daily    #PAD  # A-fib on eliquis (worsening)  May have been worsened because of GI bleed  - s/p successful DCCV 10/31   - 7/16 In RVR, 2 doses of amio overnight; Cards recommended resuming home dose sotalol 40 mg PO (qTC wnl), s/p 1 dose sotalol but RVR persisted  - 7/16 Started amio loading  - 7/22 Added digoxin load for persistent HR 130s over past week  - 7/23 Dig level 1.3, patient's HR back in 60s in the afternoon  - 7/28 Patient pulled NG tube, unable to get several doses of amio, back in afib with RVR  Plan:  - Amio bolus followed by 1  - Hold eliquis   - If able to put in NG tube, continue amio 200 mg daily  - Continue digoxin maintenance dose 125 mcg daily  - Hold home sotalol  - Cardiology following, advises against chemical cardioversion off AC if possible    #NSVT (stable)  Likely 2/2 cardiac structural damage given prior stents as well as electrolyte abnormalities while being diuresed. Troponins elevated but stable today. Troponins also elevated earlier in admission. EKG without ST elevations.  Plan:  - Continue to monitor  - Replete electrolytes as necessary    PULMONARY  #Intubated for airway protection prior to EGD/IR embolization (resolved)  Course:  - Extubated 7/22, SpO2 100% on RA, reintubated 7/27    RENAL/  #RAZIA (resolved)  - Cr peaked at 3.38 (7/11)  - Currently Cr 1.13 ( 7/24)  Plan:  - Continue to monitor Cr    #ATN i.s.o hypotension  #metabolic acidosis (improving)  Patient was in a state of metabolic acidosis around 7/16, but recent ABG pH in normal range. However, patient has been bicarb deficient throughout the admission.   Plan:  - f/u nephro recs  - Continue bicarb 650mg tid considering hypernatremia  - Per nephro, patient is not a dialysis candidate  - trend BUN/Cr   - monitor Is and Os     #Hypernatremia (worsened)  Patient having a gradually increasing sodium since transfer to the MICU. Na was improving by adding free water, lowering the bicarb drip, and diuresis with bumex/metolazone. However, back to 149 today.  Plan:  - Continue free water 300mL q6  - Continue lower dose sodium bicarb 650 mg tid as it may be a contributing factor  - Continue bumex 1mg q12 IV  - If NG tube replaced, continue metolazone 5mg PO daily    #Urinary retention (improving)  Plan:  - Started Doxazosin 2 mg daily (7/15)  - Monitor I/Os  - Ivory placed by urology 7/19, making urine    GASTROINTESTINAL  #Upper GI Bleed (stable)  Course:  - 7/9 CT A/P - Active bleeding in the third portion of the duodenum. Progression of liver metastases.  - 7/9 EGD showed esophagitis, One non-bleeding duodenal ulcer with a clean ulcer base (Arjun Class III). One oozing duodenal ulcer with spurting hemorrhage (Arjun Class Ia). Treatment not successful. Treated with bipolar cautery.  - 7/9 s/p IR GDA embolization  - 7/18 Patient had melanotic stool with hemoglobin drop 10.1 to 7.1; EGD showing duodenal bleed s/p 3 clips + epi  - CTA (7/20) showed no active bleed  - H. pylori negative  - Total EGDs: 3  Plan:  - Continue pantoprazole 40 mg IV q12  - monitor Hgb, transfuse as needed for Hgb <8  - continue to f/u with GI and IR     - Per GI, "Will need PPI BID for 8 weeks for grade D esophagitis and PUD"    - Per GI, high risk of 30 day rebleed (>10-20% risk)  - No acute IR intervention per IR because no active area of extravasation on imaging (7/21)  - No acute GI intervention per GI (7/21)  - Surgery evaluated patient 7/23, may be a candidate for duodenotomy to oversew ulcers, but patient cannot be on aspirin  - Reached out to GI (7/26) about longer-term goals including risk of not being on aspirin vs bleeding;  ·	Per GI, still a risks vs benefits situation as he may bleed again with ASA, patient may require a surgical intervention next    #Nutrition (worsened)  Course:  - FEES (7/17) failed  - ENT consulted for vallecular lesion, diagnosed vallecular cyst, no inpatient intervention required, outpatient f/u  - Patient currently extubated with NG tube (7/24)  Plan:  - Tube feeds per RD, added multivitamin if NG tube replaced  - Plan for FEES Monday 7/29 per speech and swallow    #Diarrhea (resolved)  Course:  - c. diff + (7/14)  - vancomycin (7/14 -7/23) active infection dose, vancomycin prophylaxis (7/24-7/25)   Plan:  - No longer on vancomycin    #Transaminitis (worsened)  Findings:  - Bili 1.1 (7/16) -> 1.3 (7/17) ->0.5 (7/24) -> 0.6 (7/28)  - Alk P 359 (7/16)-> 484 (7/17) -> 174 (7/24) -> 229 (7/28)  -  (7/16)->129 (7/17) -> 75 (7/24) -> 65 (7/28)  - ALT 67 (7/16)-> 72 (7/17) -> 30 (7/24) -> 229 (7/28)  - RUQ US (7/17): known liver mets, no acute findings  Plan:  - Continue atorvastatin 80 mg daily    INFECTIOUS DISEASE  #leukocytosis (resolved)  Cultures:  - Blood cultures negative throughout admission  - Sputum Cx from ETT 7/13 showed numerous gram neg krishna (Klebsiella oxytoca/raoultella ornithinolytica)  - GI PCR negative  - MRSA swab negative  Antibiotics Course:  - Vancomycin (7/14 - 7/23), Vancomycin Prophylaxis (7/24 -7/25)  - Zosyn (7/11-7/18), resistance shown, switched to meropenem  - Meropenem (7/18-7/25)  Plan:  - No longer on antibiotics  - Continue to monitor for fevers    ENDOCRINE  #adrenal insufficiency (stable)  Findings:  -7/5 cortisol 17   - 7/11 cortisol 37.1   Plan:  - Taper hydrocortisone to 50mg bid  - Monitor sugars with steroid    HEMATOLOGY/ONCOLOGY   # neuroendocrine tumor (unchanged)  - was on lanreotide then had POD in liver and started on peptide receptor radiotherapy (PRRT)- typically given every 8 weeks for 4 doses. He last received it 10/20/2023   - PET 5/28/24 shows new somatostatin avid osseous lesions; decreased intensely avid right supradiaphragmatic and upper abdominal nodes, suspicious for mets  - Gastrin level 5509  Plan:  - per heme/onc:    - can resume octreotide 150 mcg bid once infection r/o      - can check factor levels V, VIII, X     - "-- f/u with Dr. Sophia Prasad at [facility] after discharge, no plan for treatment inpatient, if clinically improves/stabilizes then can be considered for treatment outpatient"    #Normocytic anemia (stable)  - Pt with low Hgb 7-9  - ISO of GI bleed hx and continued melanotic BMs, there is c/o of rebleed; per GI, high risk of rebleeding, currently seems to be slow bleeding   Plan:  - Surgery and GI made aware & are following   - Continue CBC q12 unless having large melanotic stools    #DVT ppx (unchanged)  - SCDs  - LE duplex (7/17): negative for DVT    SKINS:   - Lines/Tubes - PIV, cordis replaced with central line (7/14-7/23), A line (7/20), Subclavian central line (7/24- )    Ethics:   - Full Code   - GOC 7/12, spoke to spouse (Yamilet) over phone with palliative care team, discussed GOC again on 7/16    Consults this admission: GI, Heme Onc, Palliative, Endocrinology, Cardiology, Nephrology, Interventional Radiology, General Surgery

## 2024-07-28 NOTE — PROGRESS NOTE ADULT - SUBJECTIVE AND OBJECTIVE BOX
Patient is a 78y old  Male who presents with a chief complaint of hypotension (28 Jul 2024 05:10)    Patient seen and examined  Diarrhea resolved, no new complaints     MEDICATIONS  (STANDING):  aMIOdarone Infusion 0.501 mG/Min (16.7 mL/Hr) IV Continuous <Continuous>  aMIOdarone Infusion 0.999 mG/Min (33.3 mL/Hr) IV Continuous <Continuous>  atorvastatin 80 milliGRAM(s) Oral at bedtime  buMETAnide Injectable 1 milliGRAM(s) IV Push two times a day  chlorhexidine 2% Cloths 1 Application(s) Topical <User Schedule>  chlorhexidine 4% Liquid 1 Application(s) Topical <User Schedule>  digoxin     Tablet 125 MICROGram(s) Oral daily  doxazosin 2 milliGRAM(s) Oral at bedtime  droxidopa 300 milliGRAM(s) Oral every 8 hours  folic acid Injectable 1 milliGRAM(s) IV Push daily  hydrocortisone sodium succinate Injectable 50 milliGRAM(s) IV Push every 12 hours  insulin lispro (ADMELOG) corrective regimen sliding scale   SubCutaneous every 6 hours  metolazone 5 milliGRAM(s) Oral daily  midodrine 30 milliGRAM(s) Oral every 8 hours  multivitamin 1 Tablet(s) Oral daily  pantoprazole  Injectable 40 milliGRAM(s) IV Push every 12 hours  phenylephrine    Infusion 1 MICROgram(s)/kG/Min (47.1 mL/Hr) IV Continuous <Continuous>  sodium bicarbonate 650 milliGRAM(s) Oral three times a day  sodium chloride 0.45%. 1000 milliLiter(s) (75 mL/Hr) IV Continuous <Continuous>  thiamine Injectable 100 milliGRAM(s) IV Push daily  vasopressin Infusion 0.04 Unit(s)/Min (6 mL/Hr) IV Continuous <Continuous>    MEDICATIONS  (PRN):  sodium chloride 0.9% lock flush 10 milliLiter(s) IV Push every 1 hour PRN Pre/post blood products, medications, blood draw, and to maintain line patency    Vital Signs Last 24 Hrs  T(C): 37.3 (28 Jul 2024 04:00), Max: 37.3 (28 Jul 2024 04:00)  T(F): 99.1 (28 Jul 2024 04:00), Max: 99.1 (28 Jul 2024 04:00)  HR: 87 (28 Jul 2024 10:30) (81 - 160)  BP: --  BP(mean): --  RR: 34 (28 Jul 2024 10:30) (18 - 42)  SpO2: 100% (28 Jul 2024 10:30) (91% - 100%)    Parameters below as of 27 Jul 2024 20:00  Patient On (Oxygen Delivery Method): room air      PE  Awake, alert  Anicteric, MMM  RRR  CTAB  Abd soft, NT, ND  No c/c                        7.7    7.02  )-----------( 100      ( 28 Jul 2024 00:26 )             23.0       07-28    149<H>  |  115<H>  |  41<H>  ----------------------------<  187<H>  3.7   |  22  |  0.78    Ca    7.7<L>      28 Jul 2024 00:26  Phos  2.7     07-28  Mg     2.2     07-28    TPro  4.3<L>  /  Alb  2.4<L>  /  TBili  0.6  /  DBili  x   /  AST  65<H>  /  ALT  36  /  AlkPhos  229<H>  07-28

## 2024-07-28 NOTE — PROGRESS NOTE ADULT - NS ATTEND AMEND GEN_ALL_CORE FT
Agree with above assessment and plan.   Clinically improving, diarrhea improved. Plan to start octreotide.

## 2024-07-28 NOTE — PROGRESS NOTE ADULT - SUBJECTIVE AND OBJECTIVE BOX
DATE OF SERVICE: 07-28-24 @ 12:49    Patient is a 78y old  Male who presents with a chief complaint of hypotension (28 Jul 2024 11:34)      INTERVAL HISTORY: no complaints     REVIEW OF SYSTEMS:  CONSTITUTIONAL: No weakness  EYES/ENT: No visual changes;  No throat pain   NECK: No pain or stiffness  RESPIRATORY: No cough, wheezing; No shortness of breath  CARDIOVASCULAR: No chest pain or palpitations  GASTROINTESTINAL: No abdominal  pain. No nausea, vomiting, or hematemesis  GENITOURINARY: No dysuria, frequency or hematuria  NEUROLOGICAL: No stroke like symptoms  SKIN: No rashes    	  MEDICATIONS:  aMIOdarone Infusion 0.501 mG/Min IV Continuous <Continuous>  aMIOdarone Infusion 0.999 mG/Min IV Continuous <Continuous>  buMETAnide Injectable 1 milliGRAM(s) IV Push two times a day  digoxin     Tablet 125 MICROGram(s) Oral daily  doxazosin 2 milliGRAM(s) Oral at bedtime  droxidopa 300 milliGRAM(s) Oral every 8 hours  metolazone 5 milliGRAM(s) Oral daily  midodrine 30 milliGRAM(s) Oral every 8 hours  phenylephrine    Infusion 1 MICROgram(s)/kG/Min IV Continuous <Continuous>        PHYSICAL EXAM:  T(C): 37.1 (07-28-24 @ 12:00), Max: 37.3 (07-28-24 @ 04:00)  HR: 109 (07-28-24 @ 12:45) (76 - 160)  BP: --  RR: 37 (07-28-24 @ 12:45) (18 - 42)  SpO2: 100% (07-28-24 @ 12:45) (91% - 100%)  Wt(kg): --  I&O's Summary    27 Jul 2024 07:01  -  28 Jul 2024 07:00  --------------------------------------------------------  IN: 1606 mL / OUT: 4095 mL / NET: -2489 mL    28 Jul 2024 07:01  -  28 Jul 2024 12:49  --------------------------------------------------------  IN: 1047.7 mL / OUT: 975 mL / NET: 72.7 mL          Appearance: In no distress	  HEENT:    PERRL, EOMI	  Cardiovascular:  S1 S2, No JVD  Respiratory: Lungs clear to auscultation	  Gastrointestinal:  Soft, Non-tender, + BS	  Vascularature:  No edema of LE  Psychiatric: Appropriate affect   Neuro: no acute focal deficits                               8.6    6.36  )-----------( 99       ( 28 Jul 2024 12:00 )             25.7     07-28    147<H>  |  111<H>  |  37<H>  ----------------------------<  179<H>  2.8<LL>   |  23  |  0.75    Ca    7.4<L>      28 Jul 2024 12:00  Phos  2.7     07-28  Mg     1.7     07-28    TPro  4.3<L>  /  Alb  2.4<L>  /  TBili  0.6  /  DBili  x   /  AST  65<H>  /  ALT  36  /  AlkPhos  229<H>  07-28        Labs personally reviewed      ASSESSMENT/PLAN: 	    78-year-old male multiple medical problems history of hepatocellular carcinoma status post radiation therapy, AF s/p DCCV on Eliquis who was found to be hypotensive following NST. Patient has reported general weakness, fatigue, lightheadedness since being discharged from hospital. Reports mild chest discomfort in midsternal region. Reports dyspnea with minimal exertion. Also reports significant lack of appetite and poor PO intake. No dark or bloody stool, nausea or vomiting.       Problem/Plan - 1:  ·  Problem: Hypotension  ·  Plan: Continue with pressors in MICU  - Patient presents with hypotension and also RAZIA. Has known orthostatic hypotension.   - Patient and wife report decreased PO intake likely 2/2 malignancy.  - GIB 7/9, s/p 4 units prbc, IR for mesenteric embolization, now in MICU on pressors  - c/w Midodrine, droxidopa and IV pressors (wean as tolerated as per MICU)     Problem/Plan - 2:  ·  Problem: CAD.   ·  Plan: Recent PCI to pLAD in June 2023  - ECG non-ischemic  - Plavix held given large melena; ideally should be on Plavix but high risk to resume      Problem/Plan - 3:  ·  Problem: Atrial Fibrillation  - s/p succesful DCCV 10/31  - Hold Eliquis 5mg BID given GIB  -  c/w Amio 200mg TID per ICU  - c/w dig 125mcg PO daily  - However would advise against chemical cardioversion off AC if possible          MARY Feliciano DO [facility]  Cardiovascular Medicine  [address] [place] [address], [address]  Office: [phone]  Available via call/text on [employer] Teams

## 2024-07-28 NOTE — PROGRESS NOTE ADULT - ASSESSMENT
79 yo male with PMH of CAD s/p PCI x3 with most recent stent 6/12/24 on Plavix, chronic Afib on eliquis, orthostatic hypotension on midodrine, PAD, neuroendocrine tumor with RT currently on hold, recent admission for dx cath on 6/24/24 who presented for hypotension, admitted for GIB and hemorrhagic shock. Found to be bleeding from duodenum. Transferred to MICU, on pressors.     1. Pancreatic NET- metastatic   -- was on lanreotide then had POD in liver and started on peptide receptor radiotherapy (PRRT)- typically given every 8 weeks for 4 doses. He received one dose on 10/20/2023 and planned to get his 2nd dose at the end of December however his course was complicated by multiple hospitalizations that were noncancer related (decompensated heart failure).   -- 5/28/24 PET Dotatate (compared to 9/2023): several new somatostatin avid osseous lesions, some faintly sclerotic, suspicious for mets. Increased upper RP nodes, probably metastatic. Decreased intensely tracer avid right supradiaphragmatic and upper abdominal nodes, suspicious for metastasis. Redemonstrated intensely somatostatin avid bilobar hepatic lesions, consistent with metastases again morphologically difficult to delineate.   -- CT reviewed by [facility]: SINCE MAY 8 2024 INCREASED HEPATIC METASTASES, NEWLY SEEN PRIMARY TUMOR IN THE PANCREAS, and active bleeding within the duodenum with associated intraluminal hematoma.   -- chromogranin level is elevated at 2058, but no prior level at [facility] for review   -- f/u with Dr. Sophia Prasad at [facility] after discharge, no plan for chemotherapy inpatient, if clinically improves/stabilizes then can be considered for treatment outpatient  --will recommend starting octreotide 250mcg TID    2. Duodenal bleed, Hemorrhagic shock  - Remains in MICU, s/p extubation 7/22. NG tube in place.  - CT w/ bleed in duodenum. GI called, EGD 7/9 -> S/p  IR embolization 7/9, intubated in MICU -> s/p extubation 7/15 -> intubated 7/18  - s/p multiple transfusions, fibrinogen low would recommend Cry for < 100, but coags have improved as are essentially normal now so unlikely, probably was related to liver dysfunction.   - s/p repeat EGD 7/12 showing duodenal lesions w/clots and oozing, no clear targets for intervention and no active bleeding, purastat applied to all areas of oozing.   - S/p EGD 7/18: duodenal ulcer with active oozing, ulcer with visible vessel. Bleeding treated.   - s/p multiple transfusions, per GI, high risk for rebleed, will continue PPI.   - Repeat CT AP w/ No evidence of GI bleed.  Interval endoscopic versus surgical intervention with metallic streak artifact suggestive of surgical clips in the region of the proximal one third portions of the duodenum. Evaluation of the previously seen hematoma is limited secondary to this artifact. Moderate bilateral pleural effusions and associated compressive atelectasis, right greater than left, overall slightly increased from previous CT. Anasarca.  - Per IR, In the absences of any active extravasation on CTA there's no place to target for an embolization  - GIB now stabilized, surgery continues to monitor. If significant recurrence of UGIB, surgery may consider emergent open duodenotomy   - MICU managing antibiotics, on meropenem given fevers.   - will recommend octreotide 250 mcg TID  - s/p 1u PRBCs for Hgb 7.7    3. C. diff   - diarrhea resolved   - cont. on isolation, and abx      Christen Rosales NP  Hematology/ Oncology  [facility]  [phone] (office)  [phone] (alt office)  Evenings and weekends please call MD on call or office

## 2024-07-29 NOTE — PROGRESS NOTE ADULT - ATTENDING COMMENTS
1. Hypotension: dependent on pressors. No  evidence of current infection. Pt was on midodrine and droxidopa before NGT puled out by patient.  Continue Vasopressin  for SBP > 95. Continue stress steroids. No evidence of active bleeding at this time.     2.GI bleed; S/P embolization of GDA. Duodenal ulcer found on EGD.  Still with some melena. H/H stable. Continue PPI    3. Atrial fib . Rate controlled with amiodarone drip. No AC.    4. Neuroendocrine tumor.  with / mets to liver.     5. DVT prophylaxis ; SCD    6. GOC: Full code.      7. Pt has agreed to have NGT placed.

## 2024-07-29 NOTE — SWALLOW FEES ASSESSMENT ADULT - H & P REVIEW
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 Per chart, on 7/8 PM, pt was noted to have acute onset of melena x5 and coffee ground emesis x2-3. RRT was called on 7/9 AM for hypotension, hgb dropped from 9.2 to 7.4 (admission baseline 10-11), FOBT +, pt was started on PPI IV and transfused 1 unit of pRBC. GI was consulted and underwent EGD on 7/9 afternoon. MICU was consulted for uncontrolled bleeding during EGD. During EGD, pt became hypotensive and was given phenylephrine, had A-fib with RVR s/p amiodarone. Now s/p IR GDA embolization, admitted to MICU for further moniring i.s.o hemorrhagic shock 2/2 GI bleed. On repeat EGD, oozing but no active bleeding was noted. Hospital course c/b c.diff, and patient has been placed on vancomycin. Patient had melanotic stool 7/18 with Hgb drop 10.1 to 7.1 and received 2 units pRBC and 1 unit platelets. Patient was reintubated and underwent EGD 7/18 afternoon. GI found duodenal bleed. Patient now s/p 3x clips and epi. Since 7/18 patient has continued to have melanotic stools with hemoglobin drops concerning for slow rebleeding. Extubated 7/22./yes
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 Per chart, on 7/8 PM, pt was noted to have acute onset of melena x5 and coffee ground emesis x2-3. RRT was called on 7/9 AM for hypotension, hgb dropped from 9.2 to 7.4 (admission baseline 10-11), FOBT +, pt was started on PPI IV and transfused 1 unit of pRBC. GI was consulted and underwent EGD on 7/9 afternoon. MICU was consulted for uncontrolled bleeding during EGD. During EGD, pt became hypotensive and was given phenylephrine, had A-fib with RVR s/p amiodarone. Now s/p IR GDA embolization, admitted to MICU for further moniring i.s.o hemorrhagic shock 2/2 GI bleed. Repeat EGD 7/12 showed grade D esophagitis, organized clots with extensive duodenal ulcers (some with pigmented spots) w/o active bleeding, treated with purastat. Hgb stable since repeat scope Hospital course c/b c.diff, and patient has been placed on vancomycin. Pt extubated 7/15./yes

## 2024-07-29 NOTE — PROGRESS NOTE ADULT - SUBJECTIVE AND OBJECTIVE BOX
INTERVAL HPI/OVERNIGHT EVENTS:    SUBJECTIVE: Patient seen and examined at bedside.     ROS: All negative except as listed above.    VITAL SIGNS:  ICU Vital Signs Last 24 Hrs  T(C): 37 (29 Jul 2024 04:00), Max: 37.3 (28 Jul 2024 20:00)  T(F): 98.6 (29 Jul 2024 04:00), Max: 99.1 (28 Jul 2024 20:00)  HR: 85 (29 Jul 2024 07:00) (66 - 116)  BP: --  BP(mean): --  ABP: 111/49 (29 Jul 2024 07:00) (94/42 - 127/63)  ABP(mean): 72 (29 Jul 2024 07:00) (58 - 90)  RR: 21 (29 Jul 2024 07:00) (16 - 40)  SpO2: 100% (29 Jul 2024 07:00) (93% - 100%)    O2 Parameters below as of 28 Jul 2024 20:00  Patient On (Oxygen Delivery Method): room air            Plateau pressure:   P/F ratio:     07-28 @ 07:01  -  07-29 @ 07:00  --------------------------------------------------------  IN: 3805.9 mL / OUT: 3821 mL / NET: -15.1 mL      CAPILLARY BLOOD GLUCOSE      POCT Blood Glucose.: 140 mg/dL (29 Jul 2024 05:41)      ECG: reviewed.    PHYSICAL EXAM:    GENERAL: NAD, lying in bed comfortably  HEAD:  Atraumatic, normocephalic  EYES: EOMI, PERRLA, conjunctiva and sclera clear  NECK: Supple, trachea midline, no JVD  HEART: Regular rate and rhythm, no murmurs, rubs, or gallops  LUNGS: Unlabored respirations.  Clear to auscultation bilaterally, no crackles, wheezing, or rhonchi  ABDOMEN: Soft, nontender, nondistended, +BS  EXTREMITIES: 2+ peripheral pulses bilaterally, cap refill<2 secs. No clubbing, cyanosis, or edema  NERVOUS SYSTEM:  A&Ox3, following commands, moving all extremities, no focal deficits   SKIN: No rashes or lesions    MEDICATIONS:  MEDICATIONS  (STANDING):  aMIOdarone Infusion 0.999 mG/Min (33.3 mL/Hr) IV Continuous <Continuous>  aMIOdarone Infusion 0.501 mG/Min (16.7 mL/Hr) IV Continuous <Continuous>  atorvastatin 80 milliGRAM(s) Oral at bedtime  buMETAnide Injectable 1 milliGRAM(s) IV Push two times a day  chlorhexidine 2% Cloths 1 Application(s) Topical <User Schedule>  chlorhexidine 4% Liquid 1 Application(s) Topical <User Schedule>  digoxin     Tablet 125 MICROGram(s) Oral daily  doxazosin 2 milliGRAM(s) Oral at bedtime  droxidopa 300 milliGRAM(s) Oral every 8 hours  folic acid Injectable 1 milliGRAM(s) IV Push daily  hydrocortisone sodium succinate Injectable 50 milliGRAM(s) IV Push every 12 hours  insulin lispro (ADMELOG) corrective regimen sliding scale   SubCutaneous every 6 hours  metolazone 5 milliGRAM(s) Oral daily  midodrine 30 milliGRAM(s) Oral every 8 hours  multivitamin 1 Tablet(s) Oral daily  pantoprazole  Injectable 40 milliGRAM(s) IV Push every 12 hours  phenylephrine    Infusion 1 MICROgram(s)/kG/Min (47.1 mL/Hr) IV Continuous <Continuous>  sodium bicarbonate 650 milliGRAM(s) Oral three times a day  sodium chloride 0.45%. 1000 milliLiter(s) (75 mL/Hr) IV Continuous <Continuous>  thiamine Injectable 100 milliGRAM(s) IV Push daily  vasopressin Infusion 0.04 Unit(s)/Min (6 mL/Hr) IV Continuous <Continuous>    MEDICATIONS  (PRN):  sodium chloride 0.9% lock flush 10 milliLiter(s) IV Push every 1 hour PRN Pre/post blood products, medications, blood draw, and to maintain line patency      ALLERGIES:  Allergies    No Known Allergies    Intolerances        LABS:                        8.5    6.42  )-----------( 88 ( 29 Jul 2024 01:05 )             24.9     07-29    146<H>  |  109<H>  |  33<H>  ----------------------------<  179<H>  2.7<LL>   |  23  |  0.72    Ca    7.2<L>      29 Jul 2024 01:05  Phos  3.3     07-29  Mg     1.7     07-29    TPro  4.3<L>  /  Alb  2.3<L>  /  TBili  0.6  /  DBili  x   /  AST  48<H>  /  ALT  31  /  AlkPhos  201<H>  07-29    PT/INR - ( 29 Jul 2024 01:05 )   PT: 11.9 sec;   INR: 1.14 ratio         PTT - ( 29 Jul 2024 01:05 )  PTT:24.3 sec  Urinalysis Basic - ( 29 Jul 2024 01:05 )    Color: x / Appearance: x / SG: x / pH: x  Gluc: 179 mg/dL / Ketone: x  / Bili: x / Urobili: x   Blood: x / Protein: x / Nitrite: x   Leuk Esterase: x / RBC: x / WBC x   Sq Epi: x / Non Sq Epi: x / Bacteria: x      ABG:  pH, Arterial: 7.51 (07-29-24 @ 00:56)  pCO2, Arterial: 32 mmHg (07-29-24 @ 00:56)  pO2, Arterial: 104 mmHg (07-29-24 @ 00:56)      vBG:    Micro:    Culture - Blood (collected 07-19-24 @ 06:30)  Source: .Blood Blood-Peripheral  Final Report (07-24-24 @ 11:00):    No growth at 5 days    Culture - Blood (collected 07-19-24 @ 06:30)  Source: .Blood Blood-Peripheral  Final Report (07-24-24 @ 11:00):    No growth at 5 days    Culture - Blood (collected 07-14-24 @ 00:00)  Source: .Blood Blood-Peripheral  Final Report (07-19-24 @ 07:01):    No growth at 5 days    Culture - Blood (collected 07-13-24 @ 22:38)  Source: .Blood Blood-Peripheral  Final Report (07-19-24 @ 07:01):    No growth at 5 days    Culture - Blood (collected 07-10-24 @ 20:35)  Source: .Blood Blood-Peripheral  Final Report (07-16-24 @ 03:00):    No growth at 5 days    Culture - Blood (collected 07-10-24 @ 20:30)  Source: .Blood Blood-Peripheral  Final Report (07-16-24 @ 03:00):    No growth at 5 days        Culture - Sputum (collected 07-13-24 @ 11:23)  Source: ET Tube ET Tube  Gram Stain (07-13-24 @ 23:36):    Moderate polymorphonuclear leukocytes per low power field    No Squamous epithelial cells per low power field    Numerous Gram Negative Rods per oil power field    Few Gram Positive Rods per oil power field  Final Report (07-18-24 @ 14:30):    Numerous Klebsiella oxytoca/Raoultella ornithinolytica    Normal Respiratory Marianna present  Organism: Klebsiella oxytoca /Raoutella ornithinolytica (07-18-24 @ 14:30)  Organism: Klebsiella oxytoca /Raoutella ornithinolytica (07-18-24 @ 14:30)      Method Type: CarbaR      -  Resistance Gene to Carbapenem: Nondet  Organism: Klebsiella oxytoca /Raoutella ornithinolytica (07-18-24 @ 14:30)      Method Type: JAYA      -  Amoxicillin/Clavulanic Acid: R >16/8      -  Ampicillin: R >16 These ampicillin results predict results for amoxicillin      -  Ampicillin/Sulbactam: R >16/8      -  Aztreonam: R >16      -  Cefazolin: R >16      -  Cefepime: R >16      -  Cefoxitin: R >16      -  Ceftriaxone: R >32      -  Ciprofloxacin: R >2      -  Ertapenem: R >1      -  Gentamicin: S <=2      -  Imipenem: I 2      -  Levofloxacin: R 2      -  Meropenem: S <=1      -  Piperacillin/Tazobactam: R >64      -  Tobramycin: R >8      -  Trimethoprim/Sulfamethoxazole: S <=0.5/9.5        RADIOLOGY & ADDITIONAL TESTS: Reviewed.   INTERVAL HPI/OVERNIGHT EVENTS:     SUBJECTIVE: Patient seen and examined at bedside. Complains of back pain/stiffness.    ROS: All negative except as listed above.    VITAL SIGNS:  ICU Vital Signs Last 24 Hrs  T(C): 37 (29 Jul 2024 04:00), Max: 37.3 (28 Jul 2024 20:00)  T(F): 98.6 (29 Jul 2024 04:00), Max: 99.1 (28 Jul 2024 20:00)  HR: 85 (29 Jul 2024 07:00) (66 - 116)  BP: --  BP(mean): --  ABP: 111/49 (29 Jul 2024 07:00) (94/42 - 127/63)  ABP(mean): 72 (29 Jul 2024 07:00) (58 - 90)  RR: 21 (29 Jul 2024 07:00) (16 - 40)  SpO2: 100% (29 Jul 2024 07:00) (93% - 100%)    O2 Parameters below as of 28 Jul 2024 20:00  Patient On (Oxygen Delivery Method): room air            Plateau pressure:   P/F ratio:     07-28 @ 07:01  -  07-29 @ 07:00  --------------------------------------------------------  IN: 3805.9 mL / OUT: 3821 mL / NET: -15.1 mL      CAPILLARY BLOOD GLUCOSE      POCT Blood Glucose.: 140 mg/dL (29 Jul 2024 05:41)      ECG: reviewed.    PHYSICAL EXAM:    GENERAL: NAD, lying in bed, appearing ill  HEAD:  Atraumatic, normocephalic  EYES: EOMI, PERRLA, conjunctiva and sclera clear  NECK: Supple, trachea midline, no JVD  HEART: Irregularly irregular rhythm, no murmurs, rubs, or gallops  LUNGS: Occasional cough with upper airway sounds transmitted to b/l apices.  Clear to auscultation bilaterally, no crackles, wheezing, or rhonchi  ABDOMEN: Soft, nontender, nondistended, +BS  EXTREMITIES: Anasarca, cap refill<2 secs. Left foot cool to touch with cyanosis of toes (unchanged); other extremities warm and dry; peripheral pulses not appreciated due to edema  NERVOUS SYSTEM:  A&Ox3, following commands, moving all extremities, no focal deficits   SKIN: No rashes or lesions    MEDICATIONS:  MEDICATIONS  (STANDING):  aMIOdarone Infusion 0.999 mG/Min (33.3 mL/Hr) IV Continuous <Continuous>  aMIOdarone Infusion 0.501 mG/Min (16.7 mL/Hr) IV Continuous <Continuous>  atorvastatin 80 milliGRAM(s) Oral at bedtime  buMETAnide Injectable 1 milliGRAM(s) IV Push two times a day  chlorhexidine 2% Cloths 1 Application(s) Topical <User Schedule>  chlorhexidine 4% Liquid 1 Application(s) Topical <User Schedule>  digoxin     Tablet 125 MICROGram(s) Oral daily  doxazosin 2 milliGRAM(s) Oral at bedtime  droxidopa 300 milliGRAM(s) Oral every 8 hours  folic acid Injectable 1 milliGRAM(s) IV Push daily  hydrocortisone sodium succinate Injectable 50 milliGRAM(s) IV Push every 12 hours  insulin lispro (ADMELOG) corrective regimen sliding scale   SubCutaneous every 6 hours  metolazone 5 milliGRAM(s) Oral daily  midodrine 30 milliGRAM(s) Oral every 8 hours  multivitamin 1 Tablet(s) Oral daily  pantoprazole  Injectable 40 milliGRAM(s) IV Push every 12 hours  phenylephrine    Infusion 1 MICROgram(s)/kG/Min (47.1 mL/Hr) IV Continuous <Continuous>  sodium bicarbonate 650 milliGRAM(s) Oral three times a day  sodium chloride 0.45%. 1000 milliLiter(s) (75 mL/Hr) IV Continuous <Continuous>  thiamine Injectable 100 milliGRAM(s) IV Push daily  vasopressin Infusion 0.04 Unit(s)/Min (6 mL/Hr) IV Continuous <Continuous>    MEDICATIONS  (PRN):  sodium chloride 0.9% lock flush 10 milliLiter(s) IV Push every 1 hour PRN Pre/post blood products, medications, blood draw, and to maintain line patency      ALLERGIES:  Allergies    No Known Allergies    Intolerances        LABS:                        8.5    6.42  )-----------( 88       ( 29 Jul 2024 01:05 )             24.9     07-29    146<H>  |  109<H>  |  33<H>  ----------------------------<  179<H>  2.7<LL>   |  23  |  0.72    Ca    7.2<L>      29 Jul 2024 01:05  Phos  3.3     07-29  Mg     1.7     07-29    TPro  4.3<L>  /  Alb  2.3<L>  /  TBili  0.6  /  DBili  x   /  AST  48<H>  /  ALT  31  /  AlkPhos  201<H>  07-29    PT/INR - ( 29 Jul 2024 01:05 )   PT: 11.9 sec;   INR: 1.14 ratio         PTT - ( 29 Jul 2024 01:05 )  PTT:24.3 sec  Urinalysis Basic - ( 29 Jul 2024 01:05 )    Color: x / Appearance: x / SG: x / pH: x  Gluc: 179 mg/dL / Ketone: x  / Bili: x / Urobili: x   Blood: x / Protein: x / Nitrite: x   Leuk Esterase: x / RBC: x / WBC x   Sq Epi: x / Non Sq Epi: x / Bacteria: x      ABG:  pH, Arterial: 7.51 (07-29-24 @ 00:56)  pCO2, Arterial: 32 mmHg (07-29-24 @ 00:56)  pO2, Arterial: 104 mmHg (07-29-24 @ 00:56)      vBG:    Micro:    Culture - Blood (collected 07-19-24 @ 06:30)  Source: .Blood Blood-Peripheral  Final Report (07-24-24 @ 11:00):    No growth at 5 days    Culture - Blood (collected 07-19-24 @ 06:30)  Source: .Blood Blood-Peripheral  Final Report (07-24-24 @ 11:00):    No growth at 5 days    Culture - Blood (collected 07-14-24 @ 00:00)  Source: .Blood Blood-Peripheral  Final Report (07-19-24 @ 07:01):    No growth at 5 days    Culture - Blood (collected 07-13-24 @ 22:38)  Source: .Blood Blood-Peripheral  Final Report (07-19-24 @ 07:01):    No growth at 5 days    Culture - Blood (collected 07-10-24 @ 20:35)  Source: .Blood Blood-Peripheral  Final Report (07-16-24 @ 03:00):    No growth at 5 days    Culture - Blood (collected 07-10-24 @ 20:30)  Source: .Blood Blood-Peripheral  Final Report (07-16-24 @ 03:00):    No growth at 5 days        Culture - Sputum (collected 07-13-24 @ 11:23)  Source: ET Tube ET Tube  Gram Stain (07-13-24 @ 23:36):    Moderate polymorphonuclear leukocytes per low power field    No Squamous epithelial cells per low power field    Numerous Gram Negative Rods per oil power field    Few Gram Positive Rods per oil power field  Final Report (07-18-24 @ 14:30):    Numerous Klebsiella oxytoca/Raoultella ornithinolytica    Normal Respiratory Marianna present  Organism: Klebsiella oxytoca /Raoutella ornithinolytica (07-18-24 @ 14:30)  Organism: Klebsiella oxytoca /Raoutella ornithinolytica (07-18-24 @ 14:30)      Method Type: CarbaR      -  Resistance Gene to Carbapenem: Nondet  Organism: Klebsiella oxytoca /Raoutella ornithinolytica (07-18-24 @ 14:30)      Method Type: JAYA      -  Amoxicillin/Clavulanic Acid: R >16/8      -  Ampicillin: R >16 These ampicillin results predict results for amoxicillin      -  Ampicillin/Sulbactam: R >16/8      -  Aztreonam: R >16      -  Cefazolin: R >16      -  Cefepime: R >16      -  Cefoxitin: R >16      -  Ceftriaxone: R >32      -  Ciprofloxacin: R >2      -  Ertapenem: R >1      -  Gentamicin: S <=2      -  Imipenem: I 2      -  Levofloxacin: R 2      -  Meropenem: S <=1      -  Piperacillin/Tazobactam: R >64      -  Tobramycin: R >8      -  Trimethoprim/Sulfamethoxazole: S <=0.5/9.5        RADIOLOGY & ADDITIONAL TESTS: Reviewed.

## 2024-07-29 NOTE — SWALLOW FEES ASSESSMENT ADULT - COMMENTS
· Comments	nutrition/hydration/medications. Pt seen for FEES, however unable to tolerate scope. Suicidal ideations verbalized "leave me alone", "let me die", "I want to die", "just shoot me". Emotional support provided. Pt would not allow clinician to reattempt test, therefore terminated. Recommendations remain NPO, with non-oral nutrition/hydration/medications. Pt not a candidate for subjective swallow evaluation and requires instrumental assessment to determine candidacy for oral diet. May consider Psych consult and GOC discussion as it relates to nutrition/hydration given repeated NGT removals by pt. Findings d/w MD Amezquita.

## 2024-07-29 NOTE — PROGRESS NOTE ADULT - SUBJECTIVE AND OBJECTIVE BOX
Patient is a 78y old  Male who presents with a chief complaint of hypotension (29 Jul 2024 07:29)    Patient seen and examined  No new complaints offered    MEDICATIONS  (STANDING):  albumin human 25% IVPB 50 milliLiter(s) IV Intermittent once  aMIOdarone Infusion 0.501 mG/Min (16.7 mL/Hr) IV Continuous <Continuous>  aMIOdarone Infusion 0.999 mG/Min (33.3 mL/Hr) IV Continuous <Continuous>  atorvastatin 80 milliGRAM(s) Oral at bedtime  buMETAnide Injectable 1 milliGRAM(s) IV Push two times a day  chlorhexidine 2% Cloths 1 Application(s) Topical <User Schedule>  chlorhexidine 4% Liquid 1 Application(s) Topical <User Schedule>  digoxin     Tablet 125 MICROGram(s) Oral daily  doxazosin 2 milliGRAM(s) Oral at bedtime  droxidopa 300 milliGRAM(s) Oral every 8 hours  folic acid Injectable 1 milliGRAM(s) IV Push daily  hydrocortisone sodium succinate Injectable 50 milliGRAM(s) IV Push every 12 hours  insulin lispro (ADMELOG) corrective regimen sliding scale   SubCutaneous every 6 hours  metolazone 5 milliGRAM(s) Oral daily  midodrine 30 milliGRAM(s) Oral every 8 hours  multivitamin 1 Tablet(s) Oral daily  pantoprazole  Injectable 40 milliGRAM(s) IV Push every 12 hours  phenylephrine    Infusion 1 MICROgram(s)/kG/Min (47.1 mL/Hr) IV Continuous <Continuous>  sodium bicarbonate 650 milliGRAM(s) Oral three times a day  sodium chloride 0.45%. 1000 milliLiter(s) (75 mL/Hr) IV Continuous <Continuous>  thiamine Injectable 100 milliGRAM(s) IV Push daily    MEDICATIONS  (PRN):  sodium chloride 0.9% lock flush 10 milliLiter(s) IV Push every 1 hour PRN Pre/post blood products, medications, blood draw, and to maintain line patency      Vital Signs Last 24 Hrs  T(C): 37.1 (29 Jul 2024 08:00), Max: 37.3 (28 Jul 2024 20:00)  T(F): 98.8 (29 Jul 2024 08:00), Max: 99.1 (28 Jul 2024 20:00)  HR: 83 (29 Jul 2024 10:30) (66 - 116)  BP: --  BP(mean): --  RR: 41 (29 Jul 2024 10:30) (16 - 49)  SpO2: 100% (29 Jul 2024 10:30) (92% - 100%)    Parameters below as of 29 Jul 2024 08:00  Patient On (Oxygen Delivery Method): room air        PE  Awake, alert  Anicteric  CTAB  Anasarca  No c/c                      8.5    6.42  )-----------( 88       ( 29 Jul 2024 01:05 )             24.9       07-29    146<H>  |  109<H>  |  33<H>  ----------------------------<  179<H>  2.7<LL>   |  23  |  0.72    Ca    7.2<L>      29 Jul 2024 01:05  Phos  3.3     07-29  Mg     1.7     07-29    TPro  4.3<L>  /  Alb  2.3<L>  /  TBili  0.6  /  DBili  x   /  AST  48<H>  /  ALT  31  /  AlkPhos  201<H>  07-29

## 2024-07-29 NOTE — PROGRESS NOTE ADULT - ASSESSMENT
77 yo male with PMH of CAD s/p PCI x3 with most recent stent 6/12/24 on Plavix, chronic Afib on eliquis, orthostatic hypotension on midodrine, PAD, neuroendocrine tumor with RT currently on hold, recent admission for dx cath on 6/24/24 who presented for hypotension, admitted for GIB and hemorrhagic shock. Found to be bleeding from duodenum. Transferred to MICU, on pressors.     1. Pancreatic NET- metastatic   -- was on lanreotide then had POD in liver and started on peptide receptor radiotherapy (PRRT)- typically given every 8 weeks for 4 doses. He received one dose on 10/20/2023 and planned to get his 2nd dose at the end of December however his course was complicated by multiple hospitalizations that were noncancer related (decompensated heart failure).   -- 5/28/24 PET Dotatate (compared to 9/2023): several new somatostatin avid osseous lesions, some faintly sclerotic, suspicious for mets. Increased upper RP nodes, probably metastatic. Decreased intensely tracer avid right supradiaphragmatic and upper abdominal nodes, suspicious for metastasis. Redemonstrated intensely somatostatin avid bilobar hepatic lesions, consistent with metastases again morphologically difficult to delineate.   -- CT reviewed by [facility]: SINCE MAY 8 2024 INCREASED HEPATIC METASTASES, NEWLY SEEN PRIMARY TUMOR IN THE PANCREAS, and active bleeding within the duodenum with associated intraluminal hematoma.   -- chromogranin level is elevated at 2058, but no prior level at [facility] for review   -- f/u with Dr. Sophia Prasad at [facility] after [place], no plan for chemotherapy inpatient, if clinically improves/stabilizes then can be considered for treatment outpatient  --will recommend starting octreotide 250mcg TID    2. Duodenal bleed, Hemorrhagic shock  - Remains in MICU, s/p extubation 7/22. NG tube in place.  - CT w/ bleed in duodenum. GI called, EGD 7/9 -> S/p  IR embolization 7/9, intubated in MICU -> s/p extubation 7/15 -> intubated 7/18  - s/p multiple transfusions, fibrinogen low would recommend Cry for < 100, but coags have improved as are essentially normal now so unlikely, probably was related to liver dysfunction.   - s/p repeat EGD 7/12 showing duodenal lesions w/clots and oozing, no clear targets for intervention and no active bleeding, purastat applied to all areas of oozing.   - S/p EGD 7/18: duodenal ulcer with active oozing, ulcer with visible vessel. Bleeding treated.   - s/p multiple transfusions, per GI, high risk for rebleed, will continue PPI.   - Repeat CT AP w/ No evidence of GI bleed.  Interval endoscopic versus surgical intervention with metallic streak artifact suggestive of surgical clips in the region of the proximal one third portions of the duodenum. Evaluation of the previously seen hematoma is limited secondary to this artifact. Moderate bilateral pleural effusions and associated compressive atelectasis, right greater than left, overall slightly increased from previous CT. Anasarca.  - Per IR, In the absences of any active extravasation on CTA there's no place to target for an embolization  - GIB now stabilized, surgery continues to monitor. If significant recurrence of UGIB, surgery may consider emergent open duodenotomy   - MICU managing antibiotics, on meropenem given fevers.   - will continue to recommend octreotide 250 mcg TID    3. C. diff   - diarrhea resolved   - cont. on isolation, and abx      Christen Rosales NP  Hematology/ Oncology  [facility]  [phone] (office)  [phone] (alt office)  Evenings and weekends please call MD on call or office

## 2024-07-29 NOTE — CHART NOTE - NSCHARTNOTEFT_GEN_A_CORE
Dr. Sauer and Pablo Lai spoke to the patient and his wife. The patient agreed to try the NG tube again and stated that he would not pull it out this time.

## 2024-07-29 NOTE — PROGRESS NOTE ADULT - SUBJECTIVE AND OBJECTIVE BOX
DATE OF SERVICE: 07-29-24 @ 16:14    Patient is a 78y old  Male who presents with a chief complaint of hypotension (29 Jul 2024 11:08)      INTERVAL HISTORY: No acute events    REVIEW OF SYSTEMS:  CONSTITUTIONAL: No weakness  EYES/ENT: No visual changes;  No throat pain   NECK: No pain or stiffness  RESPIRATORY: No cough, wheezing; No shortness of breath  CARDIOVASCULAR: No chest pain or palpitations  GASTROINTESTINAL: No abdominal  pain. No nausea, vomiting, or hematemesis  GENITOURINARY: No dysuria, frequency or hematuria  NEUROLOGICAL: No stroke like symptoms  SKIN: No rashes      	  MEDICATIONS:  aMIOdarone Infusion 0.501 mG/Min IV Continuous <Continuous>  aMIOdarone Infusion 0.999 mG/Min IV Continuous <Continuous>  buMETAnide Injectable 1 milliGRAM(s) IV Push two times a day  digoxin     Tablet 125 MICROGram(s) Oral daily  doxazosin 2 milliGRAM(s) Oral at bedtime  droxidopa 300 milliGRAM(s) Oral every 8 hours  metolazone 5 milliGRAM(s) Oral daily  midodrine 30 milliGRAM(s) Oral every 8 hours  phenylephrine    Infusion 1 MICROgram(s)/kG/Min IV Continuous <Continuous>        PHYSICAL EXAM:  T(C): 37.3 (07-29-24 @ 12:00), Max: 37.3 (07-28-24 @ 20:00)  HR: 94 (07-29-24 @ 15:00) (66 - 126)  BP: --  RR: 24 (07-29-24 @ 15:00) (16 - 60)  SpO2: 100% (07-29-24 @ 15:00) (92% - 100%)  Wt(kg): --  I&O's Summary    28 Jul 2024 07:01  -  29 Jul 2024 07:00  --------------------------------------------------------  IN: 3805.9 mL / OUT: 3821 mL / NET: -15.1 mL    29 Jul 2024 07:01  -  29 Jul 2024 16:14  --------------------------------------------------------  IN: 905.1 mL / OUT: 1030 mL / NET: -124.9 mL          Appearance: In no distress	  HEENT:    PERRL, EOMI	  Cardiovascular:  S1 S2, No JVD  Respiratory: Lungs clear to auscultation	  Gastrointestinal:  Soft, Non-tender, + BS	  Vascularature:  No edema of LE  Psychiatric: Appropriate affect   Neuro: no acute focal deficits                               8.5    7.46  )-----------( 110      ( 29 Jul 2024 11:41 )             26.1     07-29    144  |  108  |  34<H>  ----------------------------<  163<H>  2.7<LL>   |  23  |  0.72    Ca    7.2<L>      29 Jul 2024 11:41  Phos  2.9     07-29  Mg     1.8     07-29    TPro  4.3<L>  /  Alb  2.3<L>  /  TBili  0.6  /  DBili  x   /  AST  48<H>  /  ALT  31  /  AlkPhos  201<H>  07-29        Labs personally reviewed      ASSESSMENT/PLAN: 	    78-year-old male multiple medical problems history of hepatocellular carcinoma status post radiation therapy, AF s/p DCCV on Eliquis who was found to be hypotensive following NST. Patient has reported general weakness, fatigue, lightheadedness since being discharged from hospital. Reports mild chest discomfort in midsternal region. Reports dyspnea with minimal exertion. Also reports significant lack of appetite and poor PO intake. No dark or bloody stool, nausea or vomiting.       Problem/Plan - 1:  ·  Problem: Hypotension  ·  Plan: Continue with pressors in MICU  - Patient presents with hypotension and also RAZIA. Has known orthostatic hypotension.   - Patient and wife report decreased PO intake likely 2/2 malignancy.  - GIB 7/9, s/p 4 units prbc, IR for mesenteric embolization, now in MICU on pressors  - c/w Midodrine, droxidopa and IV pressors (wean as tolerated as per MICU)     Problem/Plan - 2:  ·  Problem: CAD.   ·  Plan: Recent PCI to pLAD in June 2023  - ECG non-ischemic  - Plavix held given large melena; ideally should be on Plavix but high risk to resume      Problem/Plan - 3:  ·  Problem: Atrial Fibrillation  - s/p succesful DCCV 10/31  - Hold Eliquis 5mg BID given GIB  -  c/w Amio 200mg TID per ICU  - c/w dig 125mcg PO daily  - However would advise against chemical cardioversion off AC if possible        Bety Rubio, KATIE-C  Hank Hayes DO [facility]  Cardiovascular Medicine  [address] [place] [address], [address]  Available via call or text on [employer]  Office: [phone]

## 2024-07-29 NOTE — PROGRESS NOTE ADULT - ASSESSMENT
Assessment	  Assessment: 77 yo male with metastatic neuroendocrine pancreatic cancer (tx on hold) and PMH of CAD s/p PCI x3 with most recent stent 6/12/24 on Plavix and eliquis , chronic Afib on eliquis, orthostatic hypotension on midodrine, PAD , recent admission for dx cath on 6/24/24 presented from PCP's office for hypotension despite taking AM midodrine dose on 7/2, admitted to medicine for symptomatic hypotension and RAZIA. Per chart, on 7/8 PM, pt was noted to have acute onset of melena x5 and coffee ground emesis x2-3. RRT was called on 7/9 AM for hypotension, hgb dropped from 9.2 to 7.4 (admission baseline 10-11), FOBT +, pt was started on PPI IV and transfused 1 unit of pRBC. GI was consulted and underwent EGD on 7/9 afternoon. MICU was consulted for uncontrolled bleeding during EGD. During EGD, pt became hypotensive and was given phenylephrine, had A-fib with RVR s/p amiodarone. Now s/p IR GDA embolization, admitted to MICU for further monitoring i.s.o hemorrhagic shock 2/2 GI bleed. On repeat EGD, oozing but no active bleeding was noted. Hospital course c/b c.diff, and patient has been placed on vancomycin. Patient had melanotic stool 7/18 with Hgb drop 10.1 to 7.1 and received 2 units pRBC and 1 unit platelets. Patient was reintubated and underwent EGD 7/18 afternoon. GI found duodenal bleed. Patient now s/p 3x clips and epi. Since 7/18 patient has continued to have melanotic stools with hemoglobin drops concerning for slow rebleeding. Currently, discussion revolves around the risks and benefits of antiplatelet therapy for recent stent vs risk of GI rebleed, and potential to undergo surgery.    - Total transfusions: 17 pRBC, 4 platelet, 5 FFP (as of 7/28)    Plan:     NEUROLOGIC  # Baseline AOx3 (waxing/waning)  Course:  - intubated 7/9, extubated 7/15  - reintubated 7/18 for EGD, extubated 7/22  -reintubated 7/23  - not sedated  - Currently AOx1-2  Plan:  - Delirium precautions    CARDIOVASCULAR  # hemorrhagic shock (worsened)  Course:  - Per spouse, pt's baseline BP is a little bit over 100  - 7/9: RRT called 7/9 for hypotension; 2/2 to Upper GI bleeds, urgently took to EGD, found bleeding ulcer that was not well controlled, underwent IR GDA embolization  - 7/18, patient had large melanotic stool with Hgb drop from 10.1-7.1, patient was restarted on henny, vaso, and levo for reintubation for GI scope; s/p 2pRBC, 1 platelet  - Total transfusions: 16 pRBC, 4 platelet, 5 FFP  Findings:  - AM cortisol 17 (7/5), AM cortisol 37.1 (7/11)  - POCUS ECHO (7/16) showed no cardiogenic shock, no tamponade, low SV indeterminate IVC, irregular B lines but not concerning for pulmonary edema, and some subdiaphragmatic fluid  Plan:  - Currently worsened as patient has pulled NG tube, unable to be given droxidopa and midodrine  - Continue to monitor CBC  - Transfuse for Hgb <8, (goal given recent stent)  - Continue droxidopa 300mg q8 and midodrine 30 mg q8 if NG tube replaced  - Continue vaso and henny, try to wean once NG tube in place; goal SBP > 100 due to wide pulse pressure  - Continue to hold Plavix 75 mg  - Continue hydrocortisone 50mg bid for refractory shock    #CAD s/p PCI x3, most recent stent 6/12/24, NURIA to pLAD (stable)  Course:  - 7/13 restarted plavix for new stent (6/12/24) per GI, 7/18 held plavix due to bleeding  - 7/22 started low-dose aspirin as safer alternative to plavix per cardiology, but d/c 7/23 as patient bled again  Plan:  - Continue to hold Plavix 75 mg and aspirin 81 mg  - Continue atorvastatin 80 mg daily    #PAD  # A-fib on eliquis (worsening)  May have been worsened because of GI bleed  - s/p successful DCCV 10/31   - 7/16 In RVR, 2 doses of amio overnight; Cards recommended resuming home dose sotalol 40 mg PO (qTC wnl), s/p 1 dose sotalol but RVR persisted  - 7/16 Started amio loading  - 7/22 Added digoxin load for persistent HR 130s over past week  - 7/23 Dig level 1.3, patient's HR back in 60s in the afternoon  - 7/28 Patient pulled NG tube, unable to get several doses of amio, back in afib with RVR  Plan:  - Amio bolus followed by 1  - Hold eliquis   - If able to put in NG tube, continue amio 200 mg daily  - Continue digoxin maintenance dose 125 mcg daily  - Hold home sotalol  - Cardiology following, advises against chemical cardioversion off AC if possible    #NSVT (stable)  Likely 2/2 cardiac structural damage given prior stents as well as electrolyte abnormalities while being diuresed. Troponins elevated but stable today. Troponins also elevated earlier in admission. EKG without ST elevations.  Plan:  - Continue to monitor  - Replete electrolytes as necessary    PULMONARY  #Intubated for airway protection prior to EGD/IR embolization (resolved)  Course:  - Extubated 7/22, SpO2 100% on RA, reintubated 7/27    RENAL/  #RAZIA (resolved)  - Cr peaked at 3.38 (7/11)  - Currently Cr 1.13 ( 7/24)  Plan:  - Continue to monitor Cr    #ATN i.s.o hypotension  #metabolic acidosis (improving)  Patient was in a state of metabolic acidosis around 7/16, but recent ABG pH in normal range. However, patient has been bicarb deficient throughout the admission.   Plan:  - f/u nephro recs  - Continue bicarb 650mg tid considering hypernatremia  - Per nephro, patient is not a dialysis candidate  - trend BUN/Cr   - monitor Is and Os     #Hypernatremia (worsened)  Patient having a gradually increasing sodium since transfer to the MICU. Na was improving by adding free water, lowering the bicarb drip, and diuresis with bumex/metolazone. However, back to 149 today.  Plan:  - Continue free water 300mL q6  - Continue lower dose sodium bicarb 650 mg tid as it may be a contributing factor  - Continue bumex 1mg q12 IV  - If NG tube replaced, continue metolazone 5mg PO daily    #Urinary retention (improving)  Plan:  - Started Doxazosin 2 mg daily (7/15)  - Monitor I/Os  - Ivory placed by urology 7/19, making urine    GASTROINTESTINAL  #Upper GI Bleed (stable)  Course:  - 7/9 CT A/P - Active bleeding in the third portion of the duodenum. Progression of liver metastases.  - 7/9 EGD showed esophagitis, One non-bleeding duodenal ulcer with a clean ulcer base (Arjun Class III). One oozing duodenal ulcer with spurting hemorrhage (Arjun Class Ia). Treatment not successful. Treated with bipolar cautery.  - 7/9 s/p IR GDA embolization  - 7/18 Patient had melanotic stool with hemoglobin drop 10.1 to 7.1; EGD showing duodenal bleed s/p 3 clips + epi  - CTA (7/20) showed no active bleed  - H. pylori negative  - Total EGDs: 3  Plan:  - Continue pantoprazole 40 mg IV q12  - monitor Hgb, transfuse as needed for Hgb <8  - continue to f/u with GI and IR     - Per GI, "Will need PPI BID for 8 weeks for grade D esophagitis and PUD"    - Per GI, high risk of 30 day rebleed (>10-20% risk)  - No acute IR intervention per IR because no active area of extravasation on imaging (7/21)  - No acute GI intervention per GI (7/21)  - Surgery evaluated patient 7/23, may be a candidate for duodenotomy to oversew ulcers, but patient cannot be on aspirin  - Reached out to GI (7/26) about longer-term goals including risk of not being on aspirin vs bleeding;  ·	Per GI, still a risks vs benefits situation as he may bleed again with ASA, patient may require a surgical intervention next    #Nutrition (worsened)  Course:  - FEES (7/17) failed  - ENT consulted for vallecular lesion, diagnosed vallecular cyst, no inpatient intervention required, outpatient f/u  - Patient currently extubated with NG tube (7/24)  Plan:  - Tube feeds per RD, added multivitamin if NG tube replaced  - Plan for FEES Monday 7/29 per speech and swallow    #Diarrhea (resolved)  Course:  - c. diff + (7/14)  - vancomycin (7/14 -7/23) active infection dose, vancomycin prophylaxis (7/24-7/25)   Plan:  - No longer on vancomycin    #Transaminitis (worsened)  Findings:  - Bili 1.1 (7/16) -> 1.3 (7/17) ->0.5 (7/24) -> 0.6 (7/28)  - Alk P 359 (7/16)-> 484 (7/17) -> 174 (7/24) -> 229 (7/28)  -  (7/16)->129 (7/17) -> 75 (7/24) -> 65 (7/28)  - ALT 67 (7/16)-> 72 (7/17) -> 30 (7/24) -> 229 (7/28)  - RUQ US (7/17): known liver mets, no acute findings  Plan:  - Continue atorvastatin 80 mg daily    INFECTIOUS DISEASE  #leukocytosis (resolved)  Cultures:  - Blood cultures negative throughout admission  - Sputum Cx from ETT 7/13 showed numerous gram neg krishna (Klebsiella oxytoca/raoultella ornithinolytica)  - GI PCR negative  - MRSA swab negative  Antibiotics Course:  - Vancomycin (7/14 - 7/23), Vancomycin Prophylaxis (7/24 -7/25)  - Zosyn (7/11-7/18), resistance shown, switched to meropenem  - Meropenem (7/18-7/25)  Plan:  - No longer on antibiotics  - Continue to monitor for fevers    ENDOCRINE  #adrenal insufficiency (stable)  Findings:  -7/5 cortisol 17   - 7/11 cortisol 37.1   Plan:  - Taper hydrocortisone to 50mg bid  - Monitor sugars with steroid    HEMATOLOGY/ONCOLOGY   # neuroendocrine tumor (unchanged)  - was on lanreotide then had POD in liver and started on peptide receptor radiotherapy (PRRT)- typically given every 8 weeks for 4 doses. He last received it 10/20/2023   - PET 5/28/24 shows new somatostatin avid osseous lesions; decreased intensely avid right supradiaphragmatic and upper abdominal nodes, suspicious for mets  - Gastrin level 5509  Plan:  - per heme/onc:    - can resume octreotide 150 mcg bid once infection r/o      - can check factor levels V, VIII, X     - "-- f/u with Dr. Sophia Prasad at [facility] after discharge, no plan for treatment inpatient, if clinically improves/stabilizes then can be considered for treatment outpatient"    #Normocytic anemia (stable)  - Pt with low Hgb 7-9  - ISO of GI bleed hx and continued melanotic BMs, there is c/o of rebleed; per GI, high risk of rebleeding, currently seems to be slow bleeding   Plan:  - Surgery and GI made aware & are following   - Continue CBC q12 unless having large melanotic stools    #DVT ppx (unchanged)  - SCDs  - LE duplex (7/17): negative for DVT    SKINS:   - Lines/Tubes - PIV, cordis replaced with central line (7/14-7/23), A line (7/20), Subclavian central line (7/24- )    Ethics:   - Full Code   - GOC 7/12, spoke to spouse (Yamilet) over phone with palliative care team, discussed GOC again on 7/16    Consults this admission: GI, Heme Onc, Palliative, Endocrinology, Cardiology, Nephrology, Interventional Radiology, General Surgery   Assessment	  Assessment: 79 yo male with metastatic neuroendocrine pancreatic cancer (tx on hold) and PMH of CAD s/p PCI x3 with most recent stent 6/12/24 on Plavix and eliquis , chronic Afib on eliquis, orthostatic hypotension on midodrine, PAD , recent admission for dx cath on 6/24/24 presented from PCP's office for hypotension despite taking AM midodrine dose on 7/2, admitted to medicine for symptomatic hypotension and RAZIA. Per chart, on 7/8 PM, pt was noted to have acute onset of melena x5 and coffee ground emesis x2-3. RRT was called on 7/9 AM for hypotension, hgb dropped from 9.2 to 7.4 (admission baseline 10-11), FOBT +, pt was started on PPI IV and transfused 1 unit of pRBC. GI was consulted and underwent EGD on 7/9 afternoon. MICU was consulted for uncontrolled bleeding during EGD. During EGD, pt became hypotensive and was given phenylephrine, had A-fib with RVR s/p amiodarone. Now s/p IR GDA embolization, admitted to MICU for further monitoring i.s.o hemorrhagic shock 2/2 GI bleed. On repeat EGD, oozing but no active bleeding was noted. Hospital course c/b c.diff, and patient has been placed on vancomycin. Patient had melanotic stool 7/18 with Hgb drop 10.1 to 7.1 and received 2 units pRBC and 1 unit platelets. Patient was reintubated and underwent EGD 7/18 afternoon. GI found duodenal bleed. Patient now s/p 3x clips and epi. Since 7/18 patient has continued to have melanotic stools with hemoglobin drops concerning for slow rebleeding. Currently, discussion revolves around the risks and benefits of antiplatelet therapy for recent stent vs risk of GI rebleed, and potential to undergo surgery.    - Total transfusions: 17 pRBC, 4 platelet, 5 FFP (as of 7/28)    Plan:     NEUROLOGIC  # Baseline AOx3 (waxing/waning)  Course:  - intubated 7/9, extubated 7/15  - reintubated 7/18 for EGD, extubated 7/22  -reintubated 7/23  - not sedated  - Currently AOx2-3  Plan:  - Delirium precautions    CARDIOVASCULAR  # hemorrhagic shock (worsened)  Course:  - Per spouse, pt's baseline BP is a little bit over 100  - 7/9: RRT called 7/9 for hypotension; 2/2 to Upper GI bleeds, urgently took to EGD, found bleeding ulcer that was not well controlled, underwent IR GDA embolization  - 7/18, patient had large melanotic stool with Hgb drop from 10.1-7.1, patient was restarted on henny, vaso, and levo for reintubation for GI scope; s/p 2pRBC, 1 platelet  - Total transfusions: 16 pRBC, 4 platelet, 5 FFP  Findings:  - AM cortisol 17 (7/5), AM cortisol 37.1 (7/11)  - POCUS ECHO (7/16) showed no cardiogenic shock, no tamponade, low SV indeterminate IVC, irregular B lines but not concerning for pulmonary edema, and some subdiaphragmatic fluid  Plan:  - Currently worsened as patient has pulled NG tube, unable to be given droxidopa and midodrine  - Continue to monitor CBC  - Transfuse for Hgb <8, (goal given recent stent)  - Continue droxidopa 300mg q8 and midodrine 30 mg q8 if NG tube replaced  - Discontinue vasopressin, monitor MAPs  - Continue to hold Plavix 75 mg  - Continue hydrocortisone 50mg bid for refractory shock    #CAD s/p PCI x3, most recent stent 6/12/24, NURIA to pLAD (stable)  Course:  - 7/13 restarted plavix for new stent (6/12/24) per GI, 7/18 held plavix due to bleeding  - 7/22 started low-dose aspirin as safer alternative to plavix per cardiology, but d/c 7/23 as patient bled again  Plan:  - Continue to hold Plavix 75 mg and aspirin 81 mg  - Continue atorvastatin 80 mg daily    #PAD  # A-fib on eliquis (worsening)  May have been worsened because of GI bleed  - s/p successful DCCV 10/31   - 7/16 In RVR, 2 doses of amio overnight; Cards recommended resuming home dose sotalol 40 mg PO (qTC wnl), s/p 1 dose sotalol but RVR persisted  - 7/16 Started amio loading  - 7/22 Added digoxin load for persistent HR 130s over past week  - 7/23 Dig level 1.3, patient's HR back in 60s in the afternoon  - 7/28 Patient pulled NG tube, unable to get several doses of amio, back in afib with RVR  Plan:  - Amio bolus followed by 1  - Hold eliquis   - If able to put in NG tube, continue amio 200 mg daily  - Continue digoxin maintenance dose 125 mcg daily  - Hold home sotalol  - Cardiology following, advises against chemical cardioversion off AC if possible    #NSVT (stable)  Likely 2/2 cardiac structural damage given prior stents as well as electrolyte abnormalities while being diuresed. Troponins elevated but stable today. Troponins also elevated earlier in admission. EKG without ST elevations.  Plan:  - Continue to monitor  - Replete electrolytes as necessary    PULMONARY  #Intubated for airway protection prior to EGD/IR embolization (resolved)  Course:  - Extubated 7/22, SpO2 100% on RA, reintubated 7/27    RENAL/  #RAZIA (resolved)  - Cr peaked at 3.38 (7/11)  - Currently Cr 1.13 ( 7/24)  Plan:  - Continue to monitor Cr    #ATN i.s.o hypotension  #metabolic acidosis (improving)  Patient was in a state of metabolic acidosis around 7/16, but recent ABG pH in normal range. However, patient has been bicarb deficient throughout the admission.   Plan:  - f/u nephro recs  - Continue bicarb 650mg tid considering hypernatremia  - Per nephro, patient is not a dialysis candidate  - trend BUN/Cr   - monitor Is and Os     #Hypernatremia (improved)  Patient having a gradually increasing sodium since transfer to the MICU. Na was improving by adding free water, lowering the bicarb drip, and diuresis with bumex/metolazone. However, back to 149 today.  Plan:  - Continue free water 300mL q6  - Continue lower dose sodium bicarb 650 mg tid as it may be a contributing factor  - Continue bumex 1mg q12 IV  - 50 cc 25% albumin 1 hour before bumex  - If NG tube replaced, continue metolazone 5mg PO daily    #Urinary retention (improving)  Plan:  - Started Doxazosin 2 mg daily (7/15)  - Monitor I/Os  - Ivory placed by urology 7/19, making urine    GASTROINTESTINAL  #Upper GI Bleed (stable)  Course:  - 7/9 CT A/P - Active bleeding in the third portion of the duodenum. Progression of liver metastases.  - 7/9 EGD showed esophagitis, One non-bleeding duodenal ulcer with a clean ulcer base (Arjun Class III). One oozing duodenal ulcer with spurting hemorrhage (Arjun Class Ia). Treatment not successful. Treated with bipolar cautery.  - 7/9 s/p IR GDA embolization  - 7/18 Patient had melanotic stool with hemoglobin drop 10.1 to 7.1; EGD showing duodenal bleed s/p 3 clips + epi  - CTA (7/20) showed no active bleed  - H. pylori negative  - Total EGDs: 3  Plan:  - Continue pantoprazole 40 mg IV q12  - monitor Hgb, transfuse as needed for Hgb <8  - continue to f/u with GI and IR     - Per GI, "Will need PPI BID for 8 weeks for grade D esophagitis and PUD"    - Per GI, high risk of 30 day rebleed (>10-20% risk)  - No acute IR intervention per IR because no active area of extravasation on imaging (7/21)  - No acute GI intervention per GI (7/21)  - Surgery evaluated patient 7/23, may be a candidate for duodenotomy to oversew ulcers, but patient cannot be on aspirin  - Reached out to GI (7/26) about longer-term goals including risk of not being on aspirin vs bleeding;  ·	Per GI, still a risks vs benefits situation as he may bleed again with ASA, patient may require a surgical intervention next    #Nutrition (worsened)  Course:  - FEES (7/17) failed  - ENT consulted for vallecular lesion, diagnosed vallecular cyst, no inpatient intervention required, outpatient f/u  - Patient currently extubated (7/24)  - Pt self-d/c NG tube, refusing replacement    Plan:  - Tube feeds per RD, added multivitamin if NG tube replaced  - FEES failed; patient refused evaluation; refuses NG tube    #Diarrhea (resolved)  Course:  - c. diff + (7/14)  - vancomycin (7/14 -7/23) active infection dose, vancomycin prophylaxis (7/24-7/25)   Plan:  - No longer on vancomycin  - F/u infection prevention for d/c precautions    #Transaminitis (worsened)  Findings:  - Bili 1.1 (7/16) -> 1.3 (7/17) ->0.5 (7/24) -> 0.6 (7/28)  - Alk P 359 (7/16)-> 484 (7/17) -> 174 (7/24) -> 229 (7/28)  -  (7/16)->129 (7/17) -> 75 (7/24) -> 65 (7/28)  - ALT 67 (7/16)-> 72 (7/17) -> 30 (7/24) -> 229 (7/28)  - RUQ US (7/17): known liver mets, no acute findings  Plan:  - Continue atorvastatin 80 mg daily    INFECTIOUS DISEASE  #leukocytosis (resolved)  Cultures:  - Blood cultures negative throughout admission  - Sputum Cx from ETT 7/13 showed numerous gram neg krishna (Klebsiella oxytoca/raoultella ornithinolytica)  - GI PCR negative  - MRSA swab negative  Antibiotics Course:  - Vancomycin (7/14 - 7/23), Vancomycin Prophylaxis (7/24 -7/25)  - Zosyn (7/11-7/18), resistance shown, switched to meropenem  - Meropenem (7/18-7/25)  Plan:  - No longer on antibiotics  - Continue to monitor for fevers    ENDOCRINE  #adrenal insufficiency (stable)  Findings:  -7/5 cortisol 17   - 7/11 cortisol 37.1   Plan:  - Taper hydrocortisone to 50mg bid  - Monitor sugars with steroid    HEMATOLOGY/ONCOLOGY   # neuroendocrine tumor (unchanged)  - was on lanreotide then had POD in liver and started on peptide receptor radiotherapy (PRRT)- typically given every 8 weeks for 4 doses. He last received it 10/20/2023   - PET 5/28/24 shows new somatostatin avid osseous lesions; decreased intensely avid right supradiaphragmatic and upper abdominal nodes, suspicious for mets  - Gastrin level 5509  Plan:  - per heme/onc:    - can resume octreotide 150 mcg bid once infection r/o      - can check factor levels V, VIII, X     - "-- f/u with Dr. Sophia Prasad at [facility] after discharge, no plan for treatment inpatient, if clinically improves/stabilizes then can be considered for treatment outpatient"    #Normocytic anemia (stable)  - Pt with low Hgb 7-9  - ISO of GI bleed hx and continued melanotic BMs, there is c/o of rebleed; per GI, high risk of rebleeding, currently seems to be slow bleeding   Plan:  - Surgery and GI made aware & are following   - Continue CBC q12 unless having large melanotic stools    #DVT ppx (unchanged)  - SCDs  - LE duplex (7/17): negative for DVT    SKINS:   - Lines/Tubes - PIV, cordis replaced with central line (7/14-7/23), A line (7/20-), Subclavian central line (7/24- )  - Remove A-line (right) today 7/29    Ethics:   - Full Code   - GOC 7/12, spoke to spouse (Yamilet) over phone with palliative care team, discussed GOC again on 7/16  - 7/29 Patient expressing that he wants to die, refuses to see psych, refusing NG tube, equivocating on DNR status. Continue GOC discussion.    Consults this admission: GI, Heme Onc, Palliative, Endocrinology, Cardiology, Nephrology, Interventional Radiology, General Surgery   Assessment	  Assessment: 77 yo male with metastatic neuroendocrine pancreatic cancer (tx on hold) and PMH of CAD s/p PCI x3 with most recent stent 6/12/24 on Plavix and eliquis , chronic Afib on eliquis, orthostatic hypotension on midodrine, PAD , recent admission for dx cath on 6/24/24 presented from PCP's office for hypotension despite taking AM midodrine dose on 7/2, admitted to medicine for symptomatic hypotension and RAZIA. Per chart, on 7/8 PM, pt was noted to have acute onset of melena x5 and coffee ground emesis x2-3. RRT was called on 7/9 AM for hypotension, hgb dropped from 9.2 to 7.4 (admission baseline 10-11), FOBT +, pt was started on PPI IV and transfused 1 unit of pRBC. GI was consulted and underwent EGD on 7/9 afternoon. MICU was consulted for uncontrolled bleeding during EGD. During EGD, pt became hypotensive and was given phenylephrine, had A-fib with RVR s/p amiodarone. Now s/p IR GDA embolization, admitted to MICU for further monitoring i.s.o hemorrhagic shock 2/2 GI bleed. On repeat EGD, oozing but no active bleeding was noted. Hospital course c/b c.diff, and patient has been placed on vancomycin. Patient had melanotic stool 7/18 with Hgb drop 10.1 to 7.1 and received 2 units pRBC and 1 unit platelets. Patient was reintubated and underwent EGD 7/18 afternoon. GI found duodenal bleed. Patient now s/p 3x clips and epi. Since 7/18 patient has continued to have melanotic stools with hemoglobin drops concerning for slow rebleeding. Currently, discussion revolves around the risks and benefits of antiplatelet therapy for recent stent vs risk of GI rebleed, and potential to undergo surgery.    - Total transfusions: 17 pRBC, 4 platelet, 5 FFP (as of 7/28)    Plan:     NEUROLOGIC  # Baseline AOx3 (waxing/waning)  Course:  - intubated 7/9, extubated 7/15  - reintubated 7/18 for EGD, extubated 7/22  -reintubated 7/23  - not sedated  - Currently AOx2-3  Plan:  - Delirium precautions    CARDIOVASCULAR  # hemorrhagic shock (worsened)  Course:  - Per spouse, pt's baseline BP is a little bit over 100  - 7/9: RRT called 7/9 for hypotension; 2/2 to Upper GI bleeds, urgently took to EGD, found bleeding ulcer that was not well controlled, underwent IR GDA embolization  - 7/18, patient had large melanotic stool with Hgb drop from 10.1-7.1, patient was restarted on henny, vaso, and levo for reintubation for GI scope; s/p 2pRBC, 1 platelet  - Total transfusions: 16 pRBC, 4 platelet, 5 FFP  Findings:  - AM cortisol 17 (7/5), AM cortisol 37.1 (7/11)  - POCUS ECHO (7/16) showed no cardiogenic shock, no tamponade, low SV indeterminate IVC, irregular B lines but not concerning for pulmonary edema, and some subdiaphragmatic fluid  Plan:  - Currently worsened as patient has pulled NG tube, unable to be given droxidopa and midodrine  - Continue to monitor CBC  - Transfuse for Hgb <8, (goal given recent stent)  - Continue droxidopa 300mg q8 and midodrine 30 mg q8 if NG tube replaced  - tried to wean off vasopressin today, however failed - restarted on vasopressin  - Continue to hold Plavix 75 mg  - Continue hydrocortisone 50mg bid for refractory shock    #CAD s/p PCI x3, most recent stent 6/12/24, NURIA to pLAD (stable)  Course:  - 7/13 restarted plavix for new stent (6/12/24) per GI, 7/18 held plavix due to bleeding  - 7/22 started low-dose aspirin as safer alternative to plavix per cardiology, but d/c 7/23 as patient bled again  Plan:  - Continue to hold Plavix 75 mg and aspirin 81 mg  - Continue atorvastatin 80 mg daily    #PAD  # A-fib on eliquis (worsening)  May have been worsened because of GI bleed  - s/p successful DCCV 10/31   - 7/16 In RVR, 2 doses of amio overnight; Cards recommended resuming home dose sotalol 40 mg PO (qTC wnl), s/p 1 dose sotalol but RVR persisted  - 7/16 Started amio loading  - 7/22 Added digoxin load for persistent HR 130s over past week  - 7/23 Dig level 1.3, patient's HR back in 60s in the afternoon  - 7/28 Patient pulled NG tube, unable to get several doses of amio, back in afib with RVR  Plan:  - Amio bolus followed by 1  - Hold eliquis   - If able to put in NG tube, continue amio 200 mg daily  - Continue digoxin maintenance dose 125 mcg daily  - Hold home sotalol  - Cardiology following, advises against chemical cardioversion off AC if possible    #NSVT (stable)  Likely 2/2 cardiac structural damage given prior stents as well as electrolyte abnormalities while being diuresed. Troponins elevated but stable today. Troponins also elevated earlier in admission. EKG without ST elevations.  Plan:  - Continue to monitor  - Replete electrolytes as necessary    PULMONARY  #Intubated for airway protection prior to EGD/IR embolization (resolved)  Course:  - Extubated 7/22, SpO2 100% on RA, reintubated 7/27    RENAL/  #RAZIA (resolved)  - Cr peaked at 3.38 (7/11)  - Currently Cr 1.13 ( 7/24)  Plan:  - Continue to monitor Cr    #ATN i.s.o hypotension  #metabolic acidosis (improving)  Patient was in a state of metabolic acidosis around 7/16, but recent ABG pH in normal range. However, patient has been bicarb deficient throughout the admission.   Plan:  - f/u nephro recs  - Continue bicarb 650mg tid considering hypernatremia  - Per nephro, patient is not a dialysis candidate  - trend BUN/Cr   - monitor Is and Os     #Hypernatremia (improved)  Patient having a gradually increasing sodium since transfer to the MICU. Na was improving by adding free water, lowering the bicarb drip, and diuresis with bumex/metolazone. However, back to 149 today.  Plan:  - Continue free water 300mL q6  - Continue lower dose sodium bicarb 650 mg tid as it may be a contributing factor  - Continue bumex 1mg q12 IV  - 50 cc 25% albumin 1 hour before Bumex  - If NG tube replaced, start metolazone 5mg PO daily    #Urinary retention (improving)  Plan:  - Started Doxazosin 2 mg daily (7/15)  - Monitor I/Os  - Ivory placed by urology 7/19, making urine    GASTROINTESTINAL  #Upper GI Bleed (stable)  Course:  - 7/9 CT A/P - Active bleeding in the third portion of the duodenum. Progression of liver metastases.  - 7/9 EGD showed esophagitis, One non-bleeding duodenal ulcer with a clean ulcer base (Arjun Class III). One oozing duodenal ulcer with spurting hemorrhage (Arjun Class Ia). Treatment not successful. Treated with bipolar cautery.  - 7/9 s/p IR GDA embolization  - 7/18 Patient had melanotic stool with hemoglobin drop 10.1 to 7.1; EGD showing duodenal bleed s/p 3 clips + epi  - CTA (7/20) showed no active bleed  - H. pylori negative  - Total EGDs: 3  Plan:  - Continue pantoprazole 40 mg IV q12  - monitor Hgb, transfuse as needed for Hgb <8  - continue to f/u with GI and IR     - Per GI, "Will need PPI BID for 8 weeks for grade D esophagitis and PUD"    - Per GI, high risk of 30 day rebleed (>10-20% risk)  - No acute IR intervention per IR because no active area of extravasation on imaging (7/21)  - No acute GI intervention per GI (7/21)  - Surgery evaluated patient 7/23, may be a candidate for duodenotomy to oversew ulcers, but patient cannot be on aspirin  - Reached out to GI (7/26) about longer-term goals including risk of not being on aspirin vs bleeding;  ·	Per GI, still a risks vs benefits situation as he may bleed again with ASA, patient may require a surgical intervention next    #Nutrition (worsened)  Course:  - FEES (7/17) failed  - ENT consulted for vallecular lesion, diagnosed vallecular cyst, no inpatient intervention required, outpatient f/u  - Patient currently extubated (7/24)  - Pt self-d/c NG tube - now agreeable to replacement of NG tube    Plan:  - Tube feeds per RD, added multivitamin if NG tube replaced  - FEES failed; patient refused evaluation  - pt now agreeable to NGT replacement    #Diarrhea (resolved)  Course:  - c. diff + (7/14)  - vancomycin (7/14 -7/23) active infection dose, vancomycin prophylaxis (7/24-7/25)   Plan:  - No longer on vancomycin  - F/u infection prevention for d/c precautions    #Transaminitis (worsened)  Findings:  - Bili 1.1 (7/16) -> 1.3 (7/17) ->0.5 (7/24) -> 0.6 (7/28)  - Alk P 359 (7/16)-> 484 (7/17) -> 174 (7/24) -> 229 (7/28)  -  (7/16)->129 (7/17) -> 75 (7/24) -> 65 (7/28)  - ALT 67 (7/16)-> 72 (7/17) -> 30 (7/24) -> 229 (7/28)  - RUQ US (7/17): known liver mets, no acute findings  Plan:  - Continue atorvastatin 80 mg daily    INFECTIOUS DISEASE  #leukocytosis (resolved)  Cultures:  - Blood cultures negative throughout admission  - Sputum Cx from ETT 7/13 showed numerous gram neg krishna (Klebsiella oxytoca/raoultella ornithinolytica)  - GI PCR negative  - MRSA swab negative  Antibiotics Course:  - Vancomycin (7/14 - 7/23), Vancomycin Prophylaxis (7/24 -7/25)  - Zosyn (7/11-7/18), resistance shown, switched to meropenem  - Meropenem (7/18-7/25)  Plan:  - No longer on antibiotics  - Continue to monitor for fevers    ENDOCRINE  #adrenal insufficiency (stable)  Findings:  -7/5 cortisol 17   - 7/11 cortisol 37.1   Plan:  - Taper hydrocortisone to 50mg bid  - Monitor sugars with steroid    HEMATOLOGY/ONCOLOGY   # neuroendocrine tumor (unchanged)  - was on lanreotide then had POD in liver and started on peptide receptor radiotherapy (PRRT)- typically given every 8 weeks for 4 doses. He last received it 10/20/2023   - PET 5/28/24 shows new somatostatin avid osseous lesions; decreased intensely avid right supradiaphragmatic and upper abdominal nodes, suspicious for mets  - Gastrin level 5509  Plan:  - per heme/onc:    - can resume octreotide 150 mcg bid once infection r/o      - can check factor levels V, VIII, X     - "-- f/u with Dr. Sophia Prasad at [facility] [place] after discharge, no plan for treatment inpatient, if clinically improves/stabilizes then can be considered for treatment outpatient"    #Normocytic anemia (stable)  - Pt with low Hgb 7-9  - ISO of GI bleed hx and continued melanotic BMs, there is c/o of rebleed; per GI, high risk of rebleeding, currently seems to be slow bleeding   Plan:  - Surgery and GI made aware & are following   - Continue CBC q12 unless having large melanotic stools    #DVT ppx (unchanged)  - SCDs  - LE duplex (7/17): negative for DVT    SKINS:   - Lines/Tubes - PIV, cordis replaced with central line (7/14-7/23), A line (7/20-), Subclavian central line (7/24- )  - Remove A-line (right) today 7/29    Ethics:   - Full Code   - GOC 7/12, spoke to spouse (Yamilet) over phone with palliative care team, discussed GOC again on 7/16  - 7/29 Patient expressing that he wants to die, refuses to see psych, equivocating on DNR status. Now agreeable to replacing NGT. Continue GOC discussion.    Consults this admission: GI, Heme Onc, Palliative, Endocrinology, Cardiology, Nephrology, Interventional Radiology, General Surgery   Assessment	  Assessment: 77 yo male with metastatic neuroendocrine pancreatic cancer (tx on hold) and PMH of CAD s/p PCI x3 with most recent stent 6/12/24 on Plavix and eliquis , chronic Afib on eliquis, orthostatic hypotension on midodrine, PAD , recent admission for dx cath on 6/24/24 presented from PCP's office for hypotension despite taking AM midodrine dose on 7/2, admitted to medicine for symptomatic hypotension and RAZIA. Per chart, on 7/8 PM, pt was noted to have acute onset of melena x5 and coffee ground emesis x2-3. RRT was called on 7/9 AM for hypotension, hgb dropped from 9.2 to 7.4 (admission baseline 10-11), FOBT +, pt was started on PPI IV and transfused 1 unit of pRBC. GI was consulted and underwent EGD on 7/9 afternoon. MICU was consulted for uncontrolled bleeding during EGD. During EGD, pt became hypotensive and was given phenylephrine, had A-fib with RVR s/p amiodarone. Now s/p IR GDA embolization, admitted to MICU for further monitoring i.s.o hemorrhagic shock 2/2 GI bleed. On repeat EGD, oozing but no active bleeding was noted. Hospital course c/b c.diff, and patient has been placed on vancomycin. Patient had melanotic stool 7/18 with Hgb drop 10.1 to 7.1 and received 2 units pRBC and 1 unit platelets. Patient was reintubated and underwent EGD 7/18 afternoon. GI found duodenal bleed. Patient now s/p 3x clips and epi. Since 7/18 patient has continued to have melanotic stools with hemoglobin drops concerning for slow rebleeding. Currently, discussion revolves around the risks and benefits of antiplatelet therapy for recent stent vs risk of GI rebleed, and potential to undergo surgery.    - Total transfusions: 17 pRBC, 4 platelet, 5 FFP (as of 7/28)    Plan:     NEUROLOGIC  # Baseline AOx3 (waxing/waning)  Course:  - intubated 7/9, extubated 7/15  - reintubated 7/18 for EGD, extubated 7/22  -reintubated 7/23  - not sedated  - Currently AOx2-3  Plan:  - Delirium precautions    CARDIOVASCULAR  # hemorrhagic shock (worsened)  Course:  - Per spouse, pt's baseline BP is a little bit over 100  - 7/9: RRT called 7/9 for hypotension; 2/2 to Upper GI bleeds, urgently took to EGD, found bleeding ulcer that was not well controlled, underwent IR GDA embolization  - 7/18, patient had large melanotic stool with Hgb drop from 10.1-7.1, patient was restarted on henny, vaso, and levo for reintubation for GI scope; s/p 2pRBC, 1 platelet  - Total transfusions: 16 pRBC, 4 platelet, 5 FFP  Findings:  - AM cortisol 17 (7/5), AM cortisol 37.1 (7/11)  - POCUS ECHO (7/16) showed no cardiogenic shock, no tamponade, low SV indeterminate IVC, irregular B lines but not concerning for pulmonary edema, and some subdiaphragmatic fluid  Plan:  - Currently worsened as patient has pulled NG tube, unable to be given droxidopa and midodrine  - Continue to monitor CBC  - Transfuse for Hgb <8, (goal given recent stent)  - Continue droxidopa 300mg q8 and midodrine 30 mg q8 if NG tube replaced  - tried to wean off vasopressin today, however failed - restarted on vasopressin  - Continue to hold Plavix 75 mg  - Continue hydrocortisone 50mg bid for refractory shock    #CAD s/p PCI x3, most recent stent 6/12/24, NURIA to pLAD (stable)  Course:  - 7/13 restarted plavix for new stent (6/12/24) per GI, 7/18 held plavix due to bleeding  - 7/22 started low-dose aspirin as safer alternative to plavix per cardiology, but d/c 7/23 as patient bled again  Plan:  - Continue to hold Plavix 75 mg and aspirin 81 mg  - Continue atorvastatin 80 mg daily    #PAD  # A-fib on eliquis (worsening)  May have been worsened because of GI bleed  - s/p successful DCCV 10/31   - 7/16 In RVR, 2 doses of amio overnight; Cards recommended resuming home dose sotalol 40 mg PO (qTC wnl), s/p 1 dose sotalol but RVR persisted  - 7/16 Started amio loading  - 7/22 Added digoxin load for persistent HR 130s over past week  - 7/23 Dig level 1.3, patient's HR back in 60s in the afternoon  - 7/28 Patient pulled NG tube, unable to get several doses of amio, back in afib with RVR  Plan:  - Amio bolus followed by 1  - Hold eliquis   - If able to put in NG tube, continue amio 200 mg daily  - Continue digoxin maintenance dose 125 mcg daily  - Hold home sotalol  - Cardiology following, advises against chemical cardioversion off AC if possible    #NSVT (stable)  Likely 2/2 cardiac structural damage given prior stents as well as electrolyte abnormalities while being diuresed. Troponins elevated but stable today. Troponins also elevated earlier in admission. EKG without ST elevations.  Plan:  - Continue to monitor  - Replete electrolytes as necessary    PULMONARY  #Intubated for airway protection prior to EGD/IR embolization (resolved)  Course:  - Extubated 7/22, SpO2 100% on RA, reintubated 7/27    RENAL/  #RAZIA (resolved)  - Cr peaked at 3.38 (7/11)  - Currently Cr 1.13 ( 7/24)  Plan:  - Continue to monitor Cr    #ATN i.s.o hypotension  #metabolic acidosis (improving)  Patient was in a state of metabolic acidosis around 7/16, but recent ABG pH in normal range. However, patient has been bicarb deficient throughout the admission.   Plan:  - f/u nephro recs  - Continue bicarb 650mg tid considering hypernatremia  - Per nephro, patient is not a dialysis candidate  - trend BUN/Cr   - monitor Is and Os     #Hypernatremia (improved)  Patient having a gradually increasing sodium since transfer to the MICU. Na was improving by adding free water, lowering the bicarb drip, and diuresis with bumex/metolazone. However, back to 149 today.  Plan:  - Continue free water 300mL q6  - Continue lower dose sodium bicarb 650 mg tid as it may be a contributing factor  - Continue bumex 1mg q12 IV  - 50 cc 25% albumin 1 hour before Bumex  - If NG tube replaced, start metolazone 5mg PO daily    #Urinary retention (improving)  Plan:  - Started Doxazosin 2 mg daily (7/15)  - Monitor I/Os  - Ivory placed by urology 7/19, making urine    GASTROINTESTINAL  #Upper GI Bleed (stable)  Course:  - 7/9 CT A/P - Active bleeding in the third portion of the duodenum. Progression of liver metastases.  - 7/9 EGD showed esophagitis, One non-bleeding duodenal ulcer with a clean ulcer base (Arjun Class III). One oozing duodenal ulcer with spurting hemorrhage (Arjun Class Ia). Treatment not successful. Treated with bipolar cautery.  - 7/9 s/p IR GDA embolization  - 7/18 Patient had melanotic stool with hemoglobin drop 10.1 to 7.1; EGD showing duodenal bleed s/p 3 clips + epi  - CTA (7/20) showed no active bleed  - H. pylori negative  - Total EGDs: 3  Plan:  - Continue pantoprazole 40 mg IV q12  - monitor Hgb, transfuse as needed for Hgb <8  - continue to f/u with GI and IR     - Per GI, "Will need PPI BID for 8 weeks for grade D esophagitis and PUD"    - Per GI, high risk of 30 day rebleed (>10-20% risk)  - No acute IR intervention per IR because no active area of extravasation on imaging (7/21)  - No acute GI intervention per GI (7/21)  - Surgery evaluated patient 7/23, may be a candidate for duodenotomy to oversew ulcers, but patient cannot be on aspirin  - Reached out to GI (7/26) about longer-term goals including risk of not being on aspirin vs bleeding;  ·	Per GI, still a risks vs benefits situation as he may bleed again with ASA, patient may require a surgical intervention next  ·	as per GI, started on octreotide 250mg IV TID    #Nutrition (worsened)  Course:  - FEES (7/17) failed  - ENT consulted for vallecular lesion, diagnosed vallecular cyst, no inpatient intervention required, outpatient f/u  - Patient currently extubated (7/24)  - Pt self-d/c NG tube - now agreeable to replacement of NG tube    Plan:  - Tube feeds per RD, added multivitamin if NG tube replaced  - FEES failed; patient refused evaluation  - pt now agreeable to NGT replacement    #Diarrhea (resolved)  Course:  - c. diff + (7/14)  - vancomycin (7/14 -7/23) active infection dose, vancomycin prophylaxis (7/24-7/25)   Plan:  - No longer on vancomycin  - F/u infection prevention for d/c precautions    #Transaminitis (worsened)  Findings:  - Bili 1.1 (7/16) -> 1.3 (7/17) ->0.5 (7/24) -> 0.6 (7/28)  - Alk P 359 (7/16)-> 484 (7/17) -> 174 (7/24) -> 229 (7/28)  -  (7/16)->129 (7/17) -> 75 (7/24) -> 65 (7/28)  - ALT 67 (7/16)-> 72 (7/17) -> 30 (7/24) -> 229 (7/28)  - RUQ US (7/17): known liver mets, no acute findings  Plan:  - Continue atorvastatin 80 mg daily    INFECTIOUS DISEASE  #leukocytosis (resolved)  Cultures:  - Blood cultures negative throughout admission  - Sputum Cx from ETT 7/13 showed numerous gram neg krishna (Klebsiella oxytoca/raoultella ornithinolytica)  - GI PCR negative  - MRSA swab negative  Antibiotics Course:  - Vancomycin (7/14 - 7/23), Vancomycin Prophylaxis (7/24 -7/25)  - Zosyn (7/11-7/18), resistance shown, switched to meropenem  - Meropenem (7/18-7/25)  Plan:  - No longer on antibiotics  - Continue to monitor for fevers    ENDOCRINE  #adrenal insufficiency (stable)  Findings:  -7/5 cortisol 17   - 7/11 cortisol 37.1   Plan:  - Taper hydrocortisone to 50mg bid  - Monitor sugars with steroid    HEMATOLOGY/ONCOLOGY   # neuroendocrine tumor (unchanged)  - was on lanreotide then had POD in liver and started on peptide receptor radiotherapy (PRRT)- typically given every 8 weeks for 4 doses. He last received it 10/20/2023   - PET 5/28/24 shows new somatostatin avid osseous lesions; decreased intensely avid right supradiaphragmatic and upper abdominal nodes, suspicious for mets  - Gastrin level 5509  Plan:  - per heme/onc:    - can resume octreotide 150 mcg bid once infection r/o      - can check factor levels V, VIII, X     - "-- f/u with Dr. Sophia Prasad at [facility] [place] after discharge, no plan for treatment inpatient, if clinically improves/stabilizes then can be considered for treatment outpatient"    #Normocytic anemia (stable)  - Pt with low Hgb 7-9  - ISO of GI bleed hx and continued melanotic BMs, there is c/o of rebleed; per GI, high risk of rebleeding, currently seems to be slow bleeding   Plan:  - Surgery and GI made aware & are following   - Continue CBC q12 unless having large melanotic stools    #DVT ppx (unchanged)  - SCDs  - LE duplex (7/17): negative for DVT    SKINS:   - Lines/Tubes - PIV, cordis replaced with central line (7/14-7/23), A line (7/20-), Subclavian central line (7/24- )  - Remove A-line (right) today 7/29    Ethics:   - Full Code   - GOC 7/12, spoke to spouse (Yamilet) over phone with palliative care team, discussed GOC again on 7/16  - 7/29 Patient expressing that he wants to die, refuses to see psych, equivocating on DNR status. Now agreeable to replacing NGT. Continue GOC discussion.    Consults this admission: GI, Heme Onc, Palliative, Endocrinology, Cardiology, Nephrology, Interventional Radiology, General Surgery   Assessment	  Assessment: 79 yo male with metastatic neuroendocrine pancreatic cancer (tx on hold) and PMH of CAD s/p PCI x3 with most recent stent 6/12/24 on Plavix and eliquis , chronic Afib on eliquis, orthostatic hypotension on midodrine, PAD , recent admission for dx cath on 6/24/24 presented from PCP's office for hypotension despite taking AM midodrine dose on 7/2, admitted to medicine for symptomatic hypotension and RAZIA. Per chart, on 7/8 PM, pt was noted to have acute onset of melena x5 and coffee ground emesis x2-3. RRT was called on 7/9 AM for hypotension, hgb dropped from 9.2 to 7.4 (admission baseline 10-11), FOBT +, pt was started on PPI IV and transfused 1 unit of pRBC. GI was consulted and underwent EGD on 7/9 afternoon. MICU was consulted for uncontrolled bleeding during EGD. During EGD, pt became hypotensive and was given phenylephrine, had A-fib with RVR s/p amiodarone. Now s/p IR GDA embolization, admitted to MICU for further monitoring i.s.o hemorrhagic shock 2/2 GI bleed. On repeat EGD, oozing but no active bleeding was noted. Hospital course c/b c.diff, and patient has been placed on vancomycin. Patient had melanotic stool 7/18 with Hgb drop 10.1 to 7.1 and received 2 units pRBC and 1 unit platelets. Patient was reintubated and underwent EGD 7/18 afternoon. GI found duodenal bleed. Patient now s/p 3x clips and epi. Since 7/18 patient has continued to have melanotic stools with hemoglobin drops concerning for slow rebleeding. Currently, discussion revolves around the risks and benefits of antiplatelet therapy for recent stent vs risk of GI rebleed, and potential to undergo surgery.    - Total transfusions: 17 pRBC, 4 platelet, 5 FFP (as of 7/28)    Plan:     NEUROLOGIC  # Baseline AOx3 (waxing/waning)  Course:  - intubated 7/9, extubated 7/15  - reintubated 7/18 for EGD, extubated 7/22  -reintubated 7/23  - not sedated  - Currently AOx2-3  Plan:  - Delirium precautions    CARDIOVASCULAR  # hemorrhagic shock (worsened)  Course:  - Per spouse, pt's baseline BP is a little bit over 100  - 7/9: RRT called 7/9 for hypotension; 2/2 to Upper GI bleeds, urgently took to EGD, found bleeding ulcer that was not well controlled, underwent IR GDA embolization  - 7/18, patient had large melanotic stool with Hgb drop from 10.1-7.1, patient was restarted on henny, vaso, and levo for reintubation for GI scope; s/p 2pRBC, 1 platelet  - Total transfusions: 16 pRBC, 4 platelet, 5 FFP  Findings:  - AM cortisol 17 (7/5), AM cortisol 37.1 (7/11)  - POCUS ECHO (7/16) showed no cardiogenic shock, no tamponade, low SV indeterminate IVC, irregular B lines but not concerning for pulmonary edema, and some subdiaphragmatic fluid  Plan:  - Currently worsened as patient has pulled NG tube, unable to be given droxidopa and midodrine  - Continue to monitor CBC  - Transfuse for Hgb <8, (goal given recent stent)  - Continue droxidopa 300mg q8 and midodrine 30 mg q8 if NG tube replaced  - tried to wean off vasopressin today, however failed - restarted on vasopressin  - Continue to hold Plavix 75 mg  - Continue hydrocortisone 50mg bid for refractory shock    #CAD s/p PCI x3, most recent stent 6/12/24, NURIA to pLAD (stable)  Course:  - 7/13 restarted plavix for new stent (6/12/24) per GI, 7/18 held plavix due to bleeding  - 7/22 started low-dose aspirin as safer alternative to plavix per cardiology, but d/c 7/23 as patient bled again  Plan:  - Continue to hold Plavix 75 mg and aspirin 81 mg  - Continue atorvastatin 80 mg daily    #PAD  # A-fib on eliquis (worsening)  May have been worsened because of GI bleed  - s/p successful DCCV 10/31   - 7/16 In RVR, 2 doses of amio overnight; Cards recommended resuming home dose sotalol 40 mg PO (qTC wnl), s/p 1 dose sotalol but RVR persisted  - 7/16 Started amio loading  - 7/22 Added digoxin load for persistent HR 130s over past week  - 7/23 Dig level 1.3, patient's HR back in 60s in the afternoon  - 7/28 Patient pulled NG tube, unable to get several doses of amio, back in afib with RVR  Plan:  - Amio bolus followed by 1  - Hold eliquis   - If able to put in NG tube, continue amio 200 mg daily  - Continue digoxin maintenance dose 125 mcg daily  - Hold home sotalol  - Cardiology following, advises against chemical cardioversion off AC if possible    #NSVT (stable)  Likely 2/2 cardiac structural damage given prior stents as well as electrolyte abnormalities while being diuresed. Troponins elevated but stable today. Troponins also elevated earlier in admission. EKG without ST elevations.  Plan:  - Continue to monitor  - Replete electrolytes as necessary    PULMONARY  #Intubated for airway protection prior to EGD/IR embolization (resolved)  Course:  - Extubated 7/22, SpO2 100% on RA, reintubated 7/27    RENAL/  #RAZIA (resolved)  - Cr peaked at 3.38 (7/11)  - Currently Cr 1.13 ( 7/24)  Plan:  - Continue to monitor Cr    #ATN i.s.o hypotension  #metabolic acidosis (improving)  Patient was in a state of metabolic acidosis around 7/16, but recent ABG pH in normal range. However, patient has been bicarb deficient throughout the admission.   Plan:  - f/u nephro recs  - Continue bicarb 650mg tid considering hypernatremia  - Per nephro, patient is not a dialysis candidate  - trend BUN/Cr   - monitor Is and Os     #Hypernatremia (improved)  Patient having a gradually increasing sodium since transfer to the MICU. Na was improving by adding free water, lowering the bicarb drip, and diuresis with bumex/metolazone. However, back to 149 today.  Plan:  - Continue free water 300mL q6  - Continue lower dose sodium bicarb 650 mg tid as it may be a contributing factor  - Continue bumex 1mg q12 IV  - 50 cc 25% albumin 1 hour before Bumex  - If NG tube replaced, start metolazone 5mg PO daily    #Urinary retention (improving)  Plan:  - Started Doxazosin 2 mg daily (7/15)  - Monitor I/Os  - Ivory placed by urology 7/19, making urine    GASTROINTESTINAL  #Upper GI Bleed (stable)  Course:  - 7/9 CT A/P - Active bleeding in the third portion of the duodenum. Progression of liver metastases.  - 7/9 EGD showed esophagitis, One non-bleeding duodenal ulcer with a clean ulcer base (Arjun Class III). One oozing duodenal ulcer with spurting hemorrhage (Arjun Class Ia). Treatment not successful. Treated with bipolar cautery.  - 7/9 s/p IR GDA embolization  - 7/18 Patient had melanotic stool with hemoglobin drop 10.1 to 7.1; EGD showing duodenal bleed s/p 3 clips + epi  - CTA (7/20) showed no active bleed  - H. pylori negative  - Total EGDs: 3  Plan:  - Continue pantoprazole 40 mg IV q12  - monitor Hgb, transfuse as needed for Hgb <8  - continue to f/u with GI and IR     - Per GI, "Will need PPI BID for 8 weeks for grade D esophagitis and PUD"    - Per GI, high risk of 30 day rebleed (>10-20% risk)  - No acute IR intervention per IR because no active area of extravasation on imaging (7/21)  - No acute GI intervention per GI (7/21)  - Surgery evaluated patient 7/23, may be a candidate for duodenotomy to oversew ulcers, but patient cannot be on aspirin  - Reached out to GI (7/26) about longer-term goals including risk of not being on aspirin vs bleeding;  ·	Per GI, still a risks vs benefits situation as he may bleed again with ASA, patient may require a surgical intervention next  ·	as per GI, started on octreotide 250mg IV TID    #Nutrition (worsened)  Course:  - FEES (7/17) failed  - ENT consulted for vallecular lesion, diagnosed vallecular cyst, no inpatient intervention required, outpatient f/u  - Patient currently extubated (7/24)  - Pt self-d/c NG tube - now agreeable to replacement of NG tube    Plan:  - Tube feeds per RD, added multivitamin if NG tube replaced  - FEES failed; patient refused evaluation  - pt now agreeable to NGT replacement    #Diarrhea (resolved)  Course:  - c. diff + (7/14)  - vancomycin (7/14 -7/23) active infection dose, vancomycin prophylaxis (7/24-7/25)   Plan:  - No longer on vancomycin  - F/u infection prevention for d/c precautions    #Transaminitis (worsened)  Findings:  - Bili 1.1 (7/16) -> 1.3 (7/17) ->0.5 (7/24) -> 0.6 (7/28)  - Alk P 359 (7/16)-> 484 (7/17) -> 174 (7/24) -> 229 (7/28)  -  (7/16)->129 (7/17) -> 75 (7/24) -> 65 (7/28)  - ALT 67 (7/16)-> 72 (7/17) -> 30 (7/24) -> 229 (7/28)  - RUQ US (7/17): known liver mets, no acute findings  Plan:  - Continue atorvastatin 80 mg daily    INFECTIOUS DISEASE  #leukocytosis (resolved)  Cultures:  - Blood cultures negative throughout admission  - Sputum Cx from ETT 7/13 showed numerous gram neg krishna (Klebsiella oxytoca/raoultella ornithinolytica)  - GI PCR negative  - MRSA swab negative  Antibiotics Course:  - Vancomycin (7/14 - 7/23), Vancomycin Prophylaxis (7/24 -7/25)  - Zosyn (7/11-7/18), resistance shown, switched to meropenem  - Meropenem (7/18-7/25)  Plan:  - No longer on antibiotics  - Continue to monitor for fevers    ENDOCRINE  #adrenal insufficiency (stable)  Findings:  -7/5 cortisol 17   - 7/11 cortisol 37.1   Plan:  - Taper hydrocortisone to 50mg bid  - Monitor sugars with steroid    HEMATOLOGY/ONCOLOGY   # neuroendocrine tumor (unchanged)  - was on lanreotide then had POD in liver and started on peptide receptor radiotherapy (PRRT)- typically given every 8 weeks for 4 doses. He last received it 10/20/2023   - PET 5/28/24 shows new somatostatin avid osseous lesions; decreased intensely avid right supradiaphragmatic and upper abdominal nodes, suspicious for mets  - Gastrin level 5509  Plan:  - per heme/onc:    - can resume octreotide 150 mcg bid once infection r/o      - can check factor levels V, VIII, X     - "-- [place]. Sophia [place] [facility] [place] no plan for treatment inpatient, if clinically improves/stabilizes then can be considered for treatment outpatient"    #Normocytic anemia (stable)  - Pt with low Hgb 7-9  - ISO of GI bleed hx and continued melanotic BMs, there is c/o of rebleed; per GI, high risk of rebleeding, currently seems to be slow bleeding   Plan:  - Surgery and GI made aware & are following   - Continue CBC q12 unless having large melanotic stools    #DVT ppx (unchanged)  - SCDs  - LE duplex (7/17): negative for DVT    SKINS:   - Lines/Tubes - PIV, cordis replaced with central line (7/14-7/23), A line (7/20-7/29), Subclavian central line (7/24- )  - Removed A-line (R axillary) today 7/29    Ethics:   - Full Code   - GOC 7/12, spoke to spouse (Yamilet) over phone with palliative care team, discussed GOC again on 7/16  - 7/29 Patient expressing that he wants to die, refuses to see psych, equivocating on DNR status. Now agreeable to replacing NGT. Continue GOC discussion.    Consults this admission: GI, Heme Onc, Palliative, Endocrinology, Cardiology, Nephrology, Interventional Radiology, General Surgery

## 2024-07-29 NOTE — SWALLOW FEES ASSESSMENT ADULT - SLP GENERAL OBSERVATIONS
Pt encountered in bed, RA, NAD, + icu monitoring (VSS). Baseline congested cough noted w/ removal of thick yellow tinged secretions as per RN Edita. Pt Aox2 and able to follow directives, however noted to be irritable.
Pt encountered in bed, RA, +NGT, awake/alert, + ICU monitoring (O2 90-95; -140;). Cleared by MD and TAVO Castro to proceed working w/ patient and cleared for all consistencies by MD. Pt Aox3 w/ hypophonic vocal quality. Slight wet vocal quality. Able to follow directives and answer open ended questions. Pt appears more deconditioned today.

## 2024-07-29 NOTE — SWALLOW FEES ASSESSMENT ADULT - SPECIFY REASON(S)
to objectively assess swallow safety/function; r/o dysphagia.
to objectively assess swallow safety/function; r/o dysphagia. Failed dysphagia screen 7/15 on 5mL thin liquids as evidenced by coughing.

## 2024-07-30 NOTE — PROCEDURE NOTE - NSFINDINGS_GEN_A_CORE
Ice to the area for 5 times a day  Ibuprofen 2 tablets 4 times a day  Try an elastic knee brace    I believe you will need to follow-up with an orthopedic surgeon
positive return of urine in the collection bag
borborygmi with insufflation air

## 2024-07-30 NOTE — PROCEDURE NOTE - PROCEDURE DATE TIME, MLM
19-Jul-2024 08:13
14-Jul-2024 15:30
23-Jul-2024 16:58
30-Jul-2024 13:10
09-Jul-2024 17:41
20-Jul-2024 04:45
18-Jul-2024 12:00

## 2024-07-30 NOTE — PROCEDURE NOTE - NSPROCNAME_GEN_A_CORE
Feeding Tube Placement
Urinary Device Placement
Tracheal Intubation
Interventional Radiology
Central Line Insertion
Central Line Insertion
Arterial Puncture/Cannulation

## 2024-07-30 NOTE — PROCEDURE NOTE - NSPOSTPRCRAD_GEN_A_CORE
chest radiograph
central line located in the superior vena cava/no pneumothorax/post-procedure radiography performed

## 2024-07-30 NOTE — PROCEDURE NOTE - NSINFORMCONSENT_GEN_A_CORE
Benefits, risks, and possible complications of procedure explained to patient/caregiver who verbalized understanding and gave verbal consent.
This was an emergent procedure.
Benefits, risks, and possible complications of procedure explained to patient/caregiver who verbalized understanding and gave verbal consent.
Benefits, risks, and possible complications of procedure explained to patient/caregiver who verbalized understanding and gave written consent.
Benefits, risks, and possible complications of procedure explained to patient/caregiver who verbalized understanding and gave verbal consent.
This was an emergent procedure.

## 2024-07-30 NOTE — PROGRESS NOTE ADULT - SUBJECTIVE AND OBJECTIVE BOX
Subjective: Patient NANDO HERNANDEZ seen and examined at bedside. No overnight events.      Objective:    ICU Vital Signs Last 24 Hrs  T(F): 98.4 (07-30-24 @ 04:00), Max: 99.5 (07-29-24 @ 20:00)  HR: 81 (07-30-24 @ 06:45) (73 - 126)  BP: 110/54 (07-30-24 @ 06:45) (93/49 - 125/58)  BP(mean): 77 (07-30-24 @ 06:45) (69 - 86)  ABP: 103/43 (07-29-24 @ 16:15)  RR: 19 (07-30-24 @ 06:45) (18 - 60)  SpO2: 100% (07-30-24 @ 06:45) (92% - 100%)      General:  Cadio: Regular rate and rhythm; normal S1/S2; no murmurs, rubs, or gallops  Pulm: Clear to auscultation bilaterally in all fields; normal symmetric excursion; no wheezes, rales, or rhonchi  GI: Normoactive bowel sounds; nontender, nondistended  Ext: Warm and dry with capillary refill <2 sec and palpable peripheral pulses x4; No edema      07-29-24 @ 07:01  -  07-30-24 @ 07:00  --------------------------------------------------------  IN:    Albumin 5%  - 500 mL: 50 mL    Amiodarone: 50.1 mL    IV PiggyBack: 325 mL    IV PiggyBack: 350 mL    sodium chloride 0.45%: 1800 mL    Vasopressin: 18 mL    Vasopressin: 3 mL    Vasopressin: 39 mL  Total IN: 2635.1 mL    OUT:    Indwelling Catheter - Urethral (mL): 3075 mL    Stool (mL): 1 mL  Total OUT: 3076 mL    Total NET: -440.9 mL        LABS:    07-30    144  |  108  |  31<H>  ----------------------------<  138<H>  3.0<L>   |  23  |  0.79    Ca    7.4<L>      30 Jul 2024 00:30  Phos  3.0     07-30  Mg     1.8     07-30    TPro  4.3<L>  /  Alb  2.3<L>  /  TBili  0.8  /  DBili  x   /  AST  47<H>  /  ALT  31  /  AlkPhos  197<H>  07-30  LIVER FUNCTIONS - ( 30 Jul 2024 00:30 )  Alb: 2.3 g/dL / Pro: 4.3 g/dL / ALK PHOS: 197 U/L / ALT: 31 U/L / AST: 47 U/L / GGT: x                               7.8    6.85  )-----------( 102      ( 30 Jul 2024 00:29 )             23.8   PT/INR - ( 30 Jul 2024 00:29 )   PT: 11.8 sec;   INR: 1.07 ratio         PTT - ( 30 Jul 2024 00:29 )  PTT:25.3 sec  ABG - ( 29 Jul 2024 00:56 )  pH, Arterial: 7.51  pH, Blood: x     /  pCO2: 32    /  pO2: 104   / HCO3: 26    / Base Excess: 2.6   /  SaO2: 98.7            Urinalysis Basic - ( 30 Jul 2024 00:30 )    Color: x / Appearance: x / SG: x / pH: x  Gluc: 138 mg/dL / Ketone: x  / Bili: x / Urobili: x   Blood: x / Protein: x / Nitrite: x   Leuk Esterase: x / RBC: x / WBC x   Sq Epi: x / Non Sq Epi: x / Bacteria: x    CAPILLARY BLOOD GLUCOSE      POCT Blood Glucose.: 114 mg/dL (30 Jul 2024 05:30)  POCT Blood Glucose.: 126 mg/dL (29 Jul 2024 17:19)  POCT Blood Glucose.: 164 mg/dL (29 Jul 2024 11:14)    MEDICATIONS  (STANDING):  atorvastatin 80 milliGRAM(s) Oral at bedtime  buMETAnide Injectable 1 milliGRAM(s) IV Push two times a day  chlorhexidine 2% Cloths 1 Application(s) Topical <User Schedule>  chlorhexidine 4% Liquid 1 Application(s) Topical <User Schedule>  digoxin     Tablet 125 MICROGram(s) Oral daily  doxazosin 2 milliGRAM(s) Oral at bedtime  droxidopa 300 milliGRAM(s) Oral every 8 hours  folic acid Injectable 1 milliGRAM(s) IV Push daily  hydrocortisone sodium succinate Injectable 50 milliGRAM(s) IV Push daily  insulin lispro (ADMELOG) corrective regimen sliding scale   SubCutaneous every 6 hours  lidocaine 2% Gel 1 Application(s) Topical once  metolazone 5 milliGRAM(s) Oral daily  midodrine 30 milliGRAM(s) Oral every 8 hours  multivitamin 1 Tablet(s) Oral daily  octreotide  Injectable 250 MICROGram(s) IV Push three times a day  pantoprazole  Injectable 40 milliGRAM(s) IV Push every 12 hours  sodium bicarbonate 650 milliGRAM(s) Oral three times a day  sodium chloride 0.45%. 1000 milliLiter(s) (75 mL/Hr) IV Continuous <Continuous>  thiamine Injectable 100 milliGRAM(s) IV Push daily  vasopressin Infusion 0.02 Unit(s)/Min (3 mL/Hr) IV Continuous <Continuous>    MEDICATIONS  (PRN):  sodium chloride 0.9% lock flush 10 milliLiter(s) IV Push every 1 hour PRN Pre/post blood products, medications, blood draw, and to maintain line patency   Subjective: Patient NANDO HERNANDEZ seen and examined at bedside. No overnight events.      Objective:    ICU Vital Signs Last 24 Hrs  T(F): 98.4 (07-30-24 @ 04:00), Max: 99.5 (07-29-24 @ 20:00)  HR: 81 (07-30-24 @ 06:45) (73 - 126)  BP: 110/54 (07-30-24 @ 06:45) (93/49 - 125/58)  BP(mean): 77 (07-30-24 @ 06:45) (69 - 86)  ABP: 103/43 (07-29-24 @ 16:15)  RR: 19 (07-30-24 @ 06:45) (18 - 60)  SpO2: 100% (07-30-24 @ 06:45) (92% - 100%)      General: Awake and alert, lying in a bed, NAD  Cadio: Irregularly irregular rhythm; normal rate; normal S1/S2; no murmurs, rubs, or gallops  Pulm: Clear to auscultation bilaterally anteriorly; normal symmetric excursion; no wheezes, rales, or rhonchi  GI: Normoactive bowel sounds; nontender, nondistended  Ext: Warm and dry with capillary refill <2 sec; peripheral pulses not palpable; anasarca       07-29-24 @ 07:01  -  07-30-24 @ 07:00  --------------------------------------------------------  IN:    Albumin 5%  - 500 mL: 50 mL    Amiodarone: 50.1 mL    IV PiggyBack: 325 mL    IV PiggyBack: 350 mL    sodium chloride 0.45%: 1800 mL    Vasopressin: 18 mL    Vasopressin: 3 mL    Vasopressin: 39 mL  Total IN: 2635.1 mL    OUT:    Indwelling Catheter - Urethral (mL): 3075 mL    Stool (mL): 1 mL  Total OUT: 3076 mL    Total NET: -440.9 mL        LABS:    07-30    144  |  108  |  31<H>  ----------------------------<  138<H>  3.0<L>   |  23  |  0.79    Ca    7.4<L>      30 Jul 2024 00:30  Phos  3.0     07-30  Mg     1.8     07-30    TPro  4.3<L>  /  Alb  2.3<L>  /  TBili  0.8  /  DBili  x   /  AST  47<H>  /  ALT  31  /  AlkPhos  197<H>  07-30  LIVER FUNCTIONS - ( 30 Jul 2024 00:30 )  Alb: 2.3 g/dL / Pro: 4.3 g/dL / ALK PHOS: 197 U/L / ALT: 31 U/L / AST: 47 U/L / GGT: x                               7.8    6.85  )-----------( 102      ( 30 Jul 2024 00:29 )             23.8   PT/INR - ( 30 Jul 2024 00:29 )   PT: 11.8 sec;   INR: 1.07 ratio         PTT - ( 30 Jul 2024 00:29 )  PTT:25.3 sec  ABG - ( 29 Jul 2024 00:56 )  pH, Arterial: 7.51  pH, Blood: x     /  pCO2: 32    /  pO2: 104   / HCO3: 26    / Base Excess: 2.6   /  SaO2: 98.7            Urinalysis Basic - ( 30 Jul 2024 00:30 )    Color: x / Appearance: x / SG: x / pH: x  Gluc: 138 mg/dL / Ketone: x  / Bili: x / Urobili: x   Blood: x / Protein: x / Nitrite: x   Leuk Esterase: x / RBC: x / WBC x   Sq Epi: x / Non Sq Epi: x / Bacteria: x    CAPILLARY BLOOD GLUCOSE      POCT Blood Glucose.: 114 mg/dL (30 Jul 2024 05:30)  POCT Blood Glucose.: 126 mg/dL (29 Jul 2024 17:19)  POCT Blood Glucose.: 164 mg/dL (29 Jul 2024 11:14)    MEDICATIONS  (STANDING):  atorvastatin 80 milliGRAM(s) Oral at bedtime  buMETAnide Injectable 1 milliGRAM(s) IV Push two times a day  chlorhexidine 2% Cloths 1 Application(s) Topical <User Schedule>  chlorhexidine 4% Liquid 1 Application(s) Topical <User Schedule>  digoxin     Tablet 125 MICROGram(s) Oral daily  doxazosin 2 milliGRAM(s) Oral at bedtime  droxidopa 300 milliGRAM(s) Oral every 8 hours  folic acid Injectable 1 milliGRAM(s) IV Push daily  hydrocortisone sodium succinate Injectable 50 milliGRAM(s) IV Push daily  insulin lispro (ADMELOG) corrective regimen sliding scale   SubCutaneous every 6 hours  lidocaine 2% Gel 1 Application(s) Topical once  metolazone 5 milliGRAM(s) Oral daily  midodrine 30 milliGRAM(s) Oral every 8 hours  multivitamin 1 Tablet(s) Oral daily  octreotide  Injectable 250 MICROGram(s) IV Push three times a day  pantoprazole  Injectable 40 milliGRAM(s) IV Push every 12 hours  sodium bicarbonate 650 milliGRAM(s) Oral three times a day  sodium chloride 0.45%. 1000 milliLiter(s) (75 mL/Hr) IV Continuous <Continuous>  thiamine Injectable 100 milliGRAM(s) IV Push daily  vasopressin Infusion 0.02 Unit(s)/Min (3 mL/Hr) IV Continuous <Continuous>    MEDICATIONS  (PRN):  sodium chloride 0.9% lock flush 10 milliLiter(s) IV Push every 1 hour PRN Pre/post blood products, medications, blood draw, and to maintain line patency

## 2024-07-30 NOTE — PROGRESS NOTE ADULT - ASSESSMENT
Assessment	  Assessment: 79 yo male with metastatic neuroendocrine pancreatic cancer (tx on hold) and PMH of CAD s/p PCI x3 with most recent stent 6/12/24 on Plavix and eliquis , chronic Afib on eliquis, orthostatic hypotension on midodrine, PAD , recent admission for dx cath on 6/24/24 presented from PCP's office for hypotension despite taking AM midodrine dose on 7/2, admitted to medicine for symptomatic hypotension and RAZIA. Per chart, on 7/8 PM, pt was noted to have acute onset of melena x5 and coffee ground emesis x2-3. RRT was called on 7/9 AM for hypotension, hgb dropped from 9.2 to 7.4 (admission baseline 10-11), FOBT +, pt was started on PPI IV and transfused 1 unit of pRBC. GI was consulted and underwent EGD on 7/9 afternoon. MICU was consulted for uncontrolled bleeding during EGD. During EGD, pt became hypotensive and was given phenylephrine, had A-fib with RVR s/p amiodarone. Now s/p IR GDA embolization, admitted to MICU for further monitoring i.s.o hemorrhagic shock 2/2 GI bleed. On repeat EGD, oozing but no active bleeding was noted. Hospital course c/b c.diff, and patient has been placed on vancomycin. Patient had melanotic stool 7/18 with Hgb drop 10.1 to 7.1 and received 2 units pRBC and 1 unit platelets. Patient was reintubated and underwent EGD 7/18 afternoon. GI found duodenal bleed. Patient now s/p 3x clips and epi. Since 7/18 patient has continued to have melanotic stools with hemoglobin drops concerning for slow rebleeding. Currently, discussion revolves around the risks and benefits of antiplatelet therapy for recent stent vs risk of GI rebleed, and potential to undergo surgery.    - Total transfusions: 17 pRBC, 4 platelet, 5 FFP (as of 7/28)    Plan:     NEUROLOGIC  # Baseline AOx3 (waxing/waning)  Course:  - intubated 7/9, extubated 7/15  - reintubated 7/18 for EGD, extubated 7/22  -reintubated 7/23  - not sedated  - Currently AOx2-3  Plan:  - Delirium precautions    CARDIOVASCULAR  # hemorrhagic shock (worsened)  Course:  - Per spouse, pt's baseline BP is a little bit over 100  - 7/9: RRT called 7/9 for hypotension; 2/2 to Upper GI bleeds, urgently took to EGD, found bleeding ulcer that was not well controlled, underwent IR GDA embolization  - 7/18, patient had large melanotic stool with Hgb drop from 10.1-7.1, patient was restarted on henny, vaso, and levo for reintubation for GI scope; s/p 2pRBC, 1 platelet  - Total transfusions: 16 pRBC, 4 platelet, 5 FFP  Findings:  - AM cortisol 17 (7/5), AM cortisol 37.1 (7/11)  - POCUS ECHO (7/16) showed no cardiogenic shock, no tamponade, low SV indeterminate IVC, irregular B lines but not concerning for pulmonary edema, and some subdiaphragmatic fluid  Plan:  - Currently worsened as patient has pulled NG tube, unable to be given droxidopa and midodrine  - Continue to monitor CBC  - Transfuse for Hgb <8, (goal given recent stent)  - Continue droxidopa 300mg q8 and midodrine 30 mg q8 if NG tube replaced  - tried to wean off vasopressin today, however failed - restarted on vasopressin  - Continue to hold Plavix 75 mg  - Continue hydrocortisone 50mg bid for refractory shock    #CAD s/p PCI x3, most recent stent 6/12/24, NURIA to pLAD (stable)  Course:  - 7/13 restarted plavix for new stent (6/12/24) per GI, 7/18 held plavix due to bleeding  - 7/22 started low-dose aspirin as safer alternative to plavix per cardiology, but d/c 7/23 as patient bled again  Plan:  - Continue to hold Plavix 75 mg and aspirin 81 mg  - Continue atorvastatin 80 mg daily    #PAD  # A-fib on eliquis (worsening)  May have been worsened because of GI bleed  - s/p successful DCCV 10/31   - 7/16 In RVR, 2 doses of amio overnight; Cards recommended resuming home dose sotalol 40 mg PO (qTC wnl), s/p 1 dose sotalol but RVR persisted  - 7/16 Started amio loading  - 7/22 Added digoxin load for persistent HR 130s over past week  - 7/23 Dig level 1.3, patient's HR back in 60s in the afternoon  - 7/28 Patient pulled NG tube, unable to get several doses of amio, back in afib with RVR  Plan:  - Amio bolus followed by 1  - Hold eliquis   - If able to put in NG tube, continue amio 200 mg daily  - Continue digoxin maintenance dose 125 mcg daily  - Hold home sotalol  - Cardiology following, advises against chemical cardioversion off AC if possible    #NSVT (stable)  Likely 2/2 cardiac structural damage given prior stents as well as electrolyte abnormalities while being diuresed. Troponins elevated but stable today. Troponins also elevated earlier in admission. EKG without ST elevations.  Plan:  - Continue to monitor  - Replete electrolytes as necessary    PULMONARY  #Intubated for airway protection prior to EGD/IR embolization (resolved)  Course:  - Extubated 7/22, SpO2 100% on RA, reintubated 7/27, Extubated 7/28-    RENAL/  #RAZIA (resolved)  - Cr peaked at 3.38 (7/11)  - Currently Cr 1.13 ( 7/24)  Plan:  - Continue to monitor Cr    #ATN i.s.o hypotension  #metabolic acidosis (improving)  Patient was in a state of metabolic acidosis around 7/16, but recent ABG pH in normal range. However, patient has been bicarb deficient throughout the admission.   Plan:  - f/u nephro recs  - Continue bicarb 650mg tid considering hypernatremia  - Per nephro, patient is not a dialysis candidate  - trend BUN/Cr   - monitor Is and Os     #Hypernatremia (improved)  Patient having a gradually increasing sodium since transfer to the MICU. Na was improving by adding free water, lowering the bicarb drip, and diuresis with bumex/metolazone. However, back to 149 today.  Plan:  - Continue free water 300mL q6  - Continue lower dose sodium bicarb 650 mg tid as it may be a contributing factor  - Continue bumex 1mg q12 IV  - 50 cc 25% albumin 1 hour before Bumex  - If NG tube replaced, start metolazone 5mg PO daily    #Urinary retention (improving)  Plan:  - Started Doxazosin 2 mg daily (7/15)  - Monitor I/Os  - Ivory placed by urology 7/19, making urine    GASTROINTESTINAL  #Upper GI Bleed (stable)  Course:  - 7/9 CT A/P - Active bleeding in the third portion of the duodenum. Progression of liver metastases.  - 7/9 EGD showed esophagitis, One non-bleeding duodenal ulcer with a clean ulcer base (Arjun Class III). One oozing duodenal ulcer with spurting hemorrhage (Arjun Class Ia). Treatment not successful. Treated with bipolar cautery.  - 7/9 s/p IR GDA embolization  - 7/18 Patient had melanotic stool with hemoglobin drop 10.1 to 7.1; EGD showing duodenal bleed s/p 3 clips + epi  - CTA (7/20) showed no active bleed  - H. pylori negative  - Total EGDs: 3  Plan:  - Continue pantoprazole 40 mg IV q12  - monitor Hgb, transfuse as needed for Hgb <8  - continue to f/u with GI and IR     - Per GI, "Will need PPI BID for 8 weeks for grade D esophagitis and PUD"    - Per GI, high risk of 30 day rebleed (>10-20% risk)  - No acute IR intervention per IR because no active area of extravasation on imaging (7/21)  - No acute GI intervention per GI (7/21)  - Surgery evaluated patient 7/23, may be a candidate for duodenotomy to oversew ulcers, but patient cannot be on aspirin  - Reached out to GI (7/26) about longer-term goals including risk of not being on aspirin vs bleeding;  ·	Per GI, still a risks vs benefits situation as he may bleed again with ASA, patient may require a surgical intervention next  ·	as per GI, started on octreotide 250mg IV TID    #Nutrition (worsened)  Course:  - FEES (7/17) failed  - ENT consulted for vallecular lesion, diagnosed vallecular cyst, no inpatient intervention required, outpatient f/u  - Patient currently extubated (7/24)  - Pt self-d/c NG tube - now agreeable to replacement of NG tube    Plan:  - Tube feeds per RD, added multivitamin if NG tube replaced  - FEES failed; patient refused evaluation  - pt now agreeable to NGT replacement    #Diarrhea (resolved)  Course:  - c. diff + (7/14)  - vancomycin (7/14 -7/23) active infection dose, vancomycin prophylaxis (7/24-7/25)   Plan:  - No longer on vancomycin  - F/u infection prevention for d/c precautions    #Transaminitis (worsened)  Findings:  - Bili 1.1 (7/16) -> 1.3 (7/17) ->0.5 (7/24) -> 0.6 (7/28)  - Alk P 359 (7/16)-> 484 (7/17) -> 174 (7/24) -> 229 (7/28)  -  (7/16)->129 (7/17) -> 75 (7/24) -> 65 (7/28)  - ALT 67 (7/16)-> 72 (7/17) -> 30 (7/24) -> 229 (7/28)  - RUQ US (7/17): known liver mets, no acute findings  Plan:  - Continue atorvastatin 80 mg daily    INFECTIOUS DISEASE  #leukocytosis (resolved)  Cultures:  - Blood cultures negative throughout admission  - Sputum Cx from ETT 7/13 showed numerous gram neg krishna (Klebsiella oxytoca/raoultella ornithinolytica)  - GI PCR negative  - MRSA swab negative  Antibiotics Course:  - Vancomycin (7/14 - 7/23), Vancomycin Prophylaxis (7/24 -7/25)  - Zosyn (7/11-7/18), resistance shown, switched to meropenem  - Meropenem (7/18-7/25)  Plan:  - No longer on antibiotics  - Continue to monitor for fevers    ENDOCRINE  #adrenal insufficiency (stable)  Findings:  -7/5 cortisol 17   - 7/11 cortisol 37.1   Plan:  - Taper hydrocortisone to 50mg bid  - Monitor sugars with steroid    HEMATOLOGY/ONCOLOGY   # neuroendocrine tumor (unchanged)  - was on lanreotide then had POD in liver and started on peptide receptor radiotherapy (PRRT)- typically given every 8 weeks for 4 doses. He last received it 10/20/2023   - PET 5/28/24 shows new somatostatin avid osseous lesions; decreased intensely avid right supradiaphragmatic and upper abdominal nodes, suspicious for mets  - Gastrin level 5509  Plan:  - per heme/onc:    - can resume octreotide 150 mcg bid once infection r/o      - can check factor levels V, VIII, X     - "-- f/u with Dr. Sophia Prasad at [facility] [place], no plan for treatment inpatient, if clinically improves/stabilizes then can be considered for treatment outpatient"    #Normocytic anemia (stable)  - Pt with low Hgb 7-9  - ISO of GI bleed hx and continued melanotic BMs, there is c/o of rebleed; per GI, high risk of rebleeding, currently seems to be slow bleeding   Plan:  - Surgery and GI made aware & are following   - Continue CBC q12 unless having large melanotic stools    #DVT ppx (unchanged)  - SCDs  - LE duplex (7/17): negative for DVT    SKINS:   - Lines/Tubes - PIV, cordis replaced with central line (7/14-7/23), A line (7/20-7/29), Subclavian central line (7/24- )  - Removed A-line (R axillary) today 7/29    Ethics:   - Full Code   - GOC 7/12, spoke to spouse (Yamilet) over phone with palliative care team, discussed GOC again on 7/16  - 7/29 Patient expressing that he wants to die, refuses to see psych, equivocating on DNR status. Now agreeable to replacing NGT. Continue GOC discussion.    Consults this admission: GI, Heme Onc, Palliative, Endocrinology, Cardiology, Nephrology, Interventional Radiology, General Surgery   Assessment	  Assessment: 77 yo male with metastatic neuroendocrine pancreatic cancer (tx on hold) and PMH of CAD s/p PCI x3 with most recent stent 6/12/24 on Plavix and eliquis , chronic Afib on eliquis, orthostatic hypotension on midodrine, PAD , recent admission for dx cath on 6/24/24 presented from PCP's office for hypotension despite taking AM midodrine dose on 7/2, admitted to medicine for symptomatic hypotension and RAZIA. Per chart, on 7/8 PM, pt was noted to have acute onset of melena x5 and coffee ground emesis x2-3. RRT was called on 7/9 AM for hypotension, hgb dropped from 9.2 to 7.4 (admission baseline 10-11), FOBT +, pt was started on PPI IV and transfused 1 unit of pRBC. GI was consulted and underwent EGD on 7/9 afternoon. MICU was consulted for uncontrolled bleeding during EGD. During EGD, pt became hypotensive and was given phenylephrine, had A-fib with RVR s/p amiodarone. Now s/p IR GDA embolization, admitted to MICU for further monitoring i.s.o hemorrhagic shock 2/2 GI bleed. On repeat EGD, oozing but no active bleeding was noted. Hospital course c/b c.diff, and patient has been placed on vancomycin. Patient had melanotic stool 7/18 with Hgb drop 10.1 to 7.1 and received 2 units pRBC and 1 unit platelets. Patient was reintubated and underwent EGD 7/18 afternoon. GI found duodenal bleed. Patient now s/p 3x clips and epi. Since 7/18 patient has continued to have melanotic stools with hemoglobin drops concerning for slow rebleeding. Currently, discussion revolves around the risks and benefits of antiplatelet therapy for recent stent vs risk of GI rebleed, and potential to undergo surgery.    - Total transfusions: 17 pRBC, 4 platelet, 5 FFP (as of 7/28)    Plan:     NEUROLOGIC  # Baseline AOx3 (waxing/waning)  Course:  - intubated 7/9, extubated 7/15  - reintubated 7/18 for EGD, extubated 7/22  -reintubated 7/23, extubated  - not sedated  - Currently AOx2-3  Plan:  - Delirium precautions    CARDIOVASCULAR  # hemorrhagic shock (worsened)  Course:  - Per spouse, pt's baseline BP is a little bit over 100  - 7/9: RRT called 7/9 for hypotension; 2/2 to Upper GI bleeds, urgently took to EGD, found bleeding ulcer that was not well controlled, underwent IR GDA embolization  - 7/18, patient had large melanotic stool with Hgb drop from 10.1-7.1, patient was restarted on henny, vaso, and levo for reintubation for GI scope; s/p 2pRBC, 1 platelet  - Total transfusions: 16 pRBC, 4 platelet, 5 FFP  Findings:  - AM cortisol 17 (7/5), AM cortisol 37.1 (7/11)  - POCUS ECHO (7/16) showed no cardiogenic shock, no tamponade, low SV indeterminate IVC, irregular B lines but not concerning for pulmonary edema, and some subdiaphragmatic fluid  Plan:  - Currently worsened as patient has pulled NG tube, unable to be given droxidopa and midodrine  - Continue to monitor CBC  - Transfuse for Hgb <8, (goal given recent stent)  - Off pressors since 0300 on 7/30  - Continue to hold Plavix 75 mg  - Continue hydrocortisone 50mg bid for refractory shock    #CAD s/p PCI x3, most recent stent 6/12/24, NURIA to pLAD (stable)  Course:  - 7/13 restarted plavix for new stent (6/12/24) per GI, 7/18 held plavix due to bleeding  - 7/22 started low-dose aspirin as safer alternative to plavix per cardiology, but d/c 7/23 as patient bled again  Plan:  - Continue to hold Plavix 75 mg and aspirin 81 mg  - Continue atorvastatin 80 mg daily if NGT replaced    #PAD  # A-fib on eliquis (worsening)  May have been worsened because of GI bleed  - s/p successful DCCV 10/31   - 7/16 In RVR, 2 doses of amio overnight; Cards recommended resuming home dose sotalol 40 mg PO (qTC wnl), s/p 1 dose sotalol but RVR persisted  - 7/16 Started amio loading  - 7/22 Added digoxin load for persistent HR 130s over past week  - 7/23 Dig level 1.3, patient's HR back in 60s in the afternoon  - 7/28 Patient pulled NG tube, unable to get several doses of amio, back in afib with RVR  Plan:  - Amio bolus followed by 1  - Hold eliquis   - If able to put in NG tube, continue amio 200 mg daily  - Continue digoxin maintenance dose 125 mcg daily  - Hold home sotalol  - Cardiology following, advises against chemical cardioversion off AC if possible    #NSVT (stable)  Likely 2/2 cardiac structural damage given prior stents as well as electrolyte abnormalities while being diuresed. Troponins elevated but stable today. Troponins also elevated earlier in admission. EKG without ST elevations.  Plan:  - Continue to monitor  - Replete electrolytes as necessary    PULMONARY  #Intubated for airway protection prior to EGD/IR embolization (resolved)  Course:  - Extubated 7/22, SpO2 100% on RA, reintubated 7/27, Extubated 7/28-    RENAL/  #RAZIA (resolved)  - Cr peaked at 3.38 (7/11)  - Currently Cr 1.13 ( 7/24)  Plan:  - Continue to monitor Cr    #ATN i.s.o hypotension  #metabolic acidosis (improving)  Patient was in a state of metabolic acidosis around 7/16, but recent ABG pH in normal range. However, patient has been bicarb deficient throughout the admission.   Plan:  - f/u nephro recs  - Continue bicarb 650mg tid considering hypernatremia  - Per nephro, patient is not a dialysis candidate  - trend BUN/Cr   - monitor Is and Os     #Hypernatremia (improved)  Patient having a gradually increasing sodium since transfer to the MICU. Na was improving by adding free water, lowering the bicarb drip, and diuresis with bumex/metolazone. However, back to 149 today.  Plan:  - stop free water 300mL q6  - hold lower dose sodium bicarb 650 mg tid as it may be a contributing factor  - hold bumex 1mg q12 IV    #Urinary retention (improving)  Plan:  - Started Doxazosin 2 mg daily (7/15)  - Monitor I/Os  - Tavarez placed by urology 7/19, making urine  - Remove tavarez 7/30    GASTROINTESTINAL  #Upper GI Bleed (stable)  Course:  - 7/9 CT A/P - Active bleeding in the third portion of the duodenum. Progression of liver metastases.  - 7/9 EGD showed esophagitis, One non-bleeding duodenal ulcer with a clean ulcer base (Arjun Class III). One oozing duodenal ulcer with spurting hemorrhage (Arjun Class Ia). Treatment not successful. Treated with bipolar cautery.  - 7/9 s/p IR GDA embolization  - 7/18 Patient had melanotic stool with hemoglobin drop 10.1 to 7.1; EGD showing duodenal bleed s/p 3 clips + epi  - CTA (7/20) showed no active bleed  - H. pylori negative  - Total EGDs: 3  Plan:  - Continue pantoprazole 40 mg IV q12  - monitor Hgb, transfuse as needed for Hgb <8  - continue to f/u with GI and IR     - Per GI, "Will need PPI BID for 8 weeks for grade D esophagitis and PUD"    - Per GI, high risk of 30 day rebleed (>10-20% risk)  - No acute IR intervention per IR because no active area of extravasation on imaging (7/21)  - No acute GI intervention per GI (7/21)  - Surgery evaluated patient 7/23, may be a candidate for duodenotomy to oversew ulcers, but patient cannot be on aspirin  - Reached out to GI (7/26) about longer-term goals including risk of not being on aspirin vs bleeding;  ·	Per GI, still a risks vs benefits situation as he may bleed again with ASA, patient may require a surgical intervention next  ·	as per GI, started on octreotide 250mg IV TID    #Nutrition (worsened)  Course:  - FEES (7/17) failed  - ENT consulted for vallecular lesion, diagnosed vallecular cyst, no inpatient intervention required, outpatient f/u  - Patient currently extubated (7/24)  - Pt self-d/c NG tube - now agreeable to replacement of NG tube  - Give 1mg ativan, nasal lidocaine gel, affrin prior to NGT insertion    Plan:  - Tube feeds per RD, added multivitamin if NG tube replaced  - FEES failed 7/29; patient refused evaluation  - pt now agreeable to NGT replacement    #Diarrhea (resolved)  Course:  - c. diff + (7/14)  - vancomycin (7/14 -7/23) active infection dose, vancomycin prophylaxis (7/24-7/25)   Plan:  - No longer on vancomycin  - Infection prevention says contact precautions continued due to mucoid BMs    #Transaminitis (worsened)  Findings:  - Bili 1.1 (7/16) -> 1.3 (7/17) ->0.5 (7/24) -> 0.6 (7/28)  - Alk P 359 (7/16)-> 484 (7/17) -> 174 (7/24) -> 229 (7/28)  -  (7/16)->129 (7/17) -> 75 (7/24) -> 65 (7/28)  - ALT 67 (7/16)-> 72 (7/17) -> 30 (7/24) -> 229 (7/28)  - RUQ US (7/17): known liver mets, no acute findings  Plan:  - Continue atorvastatin 80 mg daily if NGT replaced    INFECTIOUS DISEASE  #leukocytosis (resolved)  Cultures:  - Blood cultures negative throughout admission  - Sputum Cx from ETT 7/13 showed numerous gram neg krishna (Klebsiella oxytoca/raoultella ornithinolytica)  - GI PCR negative  - MRSA swab negative  - 7/29 sputum culture growing P aeruginosa   Antibiotics Course:  - Vancomycin (7/14 - 7/23), Vancomycin Prophylaxis (7/24 -7/25)  - Zosyn (7/11-7/18), resistance shown, switched to meropenem  - Meropenem (7/18-7/25)  Plan:  - No longer on antibiotics  - Continue to monitor for fevers  - consider abx pending sensitivities iso cough  - repeat cxr after NGT placement 7/30    ENDOCRINE  #adrenal insufficiency (stable)  Findings:  -7/5 cortisol 17   - 7/11 cortisol 37.1   Plan:  - Taper hydrocortisone to 50mg bid  - Monitor sugars with steroid    HEMATOLOGY/ONCOLOGY   # neuroendocrine tumor (unchanged)  - was on lanreotide then had POD in liver and started on peptide receptor radiotherapy (PRRT)- typically given every 8 weeks for 4 doses. He last received it 10/20/2023   - PET 5/28/24 shows new somatostatin avid osseous lesions; decreased intensely avid right supradiaphragmatic and upper abdominal nodes, suspicious for mets  - Gastrin level 5509  Plan:  - per heme/onc:    - can resume octreotide 150 mcg bid once infection r/o      - can check factor levels V, VIII, X     - "-- f/u with Dr. Sophai Prasad at [facility] [place] after discharge, no plan for treatment inpatient, if clinically improves/stabilizes then can be considered for treatment outpatient"    #Normocytic anemia (stable)  - Pt with low Hgb 7-9  - ISO of GI bleed hx and continued melanotic BMs, there is c/o of rebleed; per GI, high risk of rebleeding, currently seems to be slow bleeding   Plan:  - Surgery and GI made aware & are following   - Continue CBC q12 unless having large melanotic stools    #DVT ppx (unchanged)  - SCDs  - LE duplex (7/17): negative for DVT    SKINS:   - Lines/Tubes - PIV, cordis replaced with central line (7/14-7/23), A line (7/20-7/29), Subclavian central line (7/24- )  - Removed A-line (R axillary) 7/29  - Removed tavarez 7/30    Ethics:   - Full Code   - GOC 7/12, spoke to spouse (Yamilet) over phone with palliative care team, discussed GOC again on 7/16  - 7/29 Patient expressing that he wants to die, refuses to see psych, equivocating on DNR status. Now agreeable to replacing NGT. Continue GOC discussion.    Consults this admission: GI, Heme Onc, Palliative, Endocrinology, Cardiology, Nephrology, Interventional Radiology, General Surgery   Assessment	  Assessment: 79 yo male with metastatic neuroendocrine pancreatic cancer (tx on hold) and PMH of CAD s/p PCI x3 with most recent stent 6/12/24 on Plavix and eliquis , chronic Afib on eliquis, orthostatic hypotension on midodrine, PAD , recent admission for dx cath on 6/24/24 presented from PCP's office for hypotension despite taking AM midodrine dose on 7/2, admitted to medicine for symptomatic hypotension and RAZIA. Per chart, on 7/8 PM, pt was noted to have acute onset of melena x5 and coffee ground emesis x2-3. RRT was called on 7/9 AM for hypotension, hgb dropped from 9.2 to 7.4 (admission baseline 10-11), FOBT +, pt was started on PPI IV and transfused 1 unit of pRBC. GI was consulted and underwent EGD on 7/9 afternoon. MICU was consulted for uncontrolled bleeding during EGD. During EGD, pt became hypotensive and was given phenylephrine, had A-fib with RVR s/p amiodarone. Now s/p IR GDA embolization, admitted to MICU for further monitoring i.s.o hemorrhagic shock 2/2 GI bleed. On repeat EGD, oozing but no active bleeding was noted. Hospital course c/b c.diff, and patient has been placed on vancomycin. Patient had melanotic stool 7/18 with Hgb drop 10.1 to 7.1 and received 2 units pRBC and 1 unit platelets. Patient was reintubated and underwent EGD 7/18 afternoon. GI found duodenal bleed. Patient now s/p 3x clips and epi. Since 7/18 patient has continued to have melanotic stools with hemoglobin drops concerning for slow rebleeding. Currently, discussion revolves around the risks and benefits of antiplatelet therapy for recent stent vs risk of GI rebleed, and potential to undergo surgery.    - Total transfusions: 17 pRBC, 4 platelet, 5 FFP (as of 7/28)    Plan:     NEUROLOGIC  # Baseline AOx3 (waxing/waning)  Course:  - intubated 7/9, extubated 7/15  - reintubated 7/18 for EGD, extubated 7/22  -reintubated 7/23, extubated  - not sedated  - Currently AOx2-3  Plan:  - Delirium precautions    CARDIOVASCULAR  # hemorrhagic shock (worsened)  Course:  - Per spouse, pt's baseline BP is a little bit over 100  - 7/9: RRT called 7/9 for hypotension; 2/2 to Upper GI bleeds, urgently took to EGD, found bleeding ulcer that was not well controlled, underwent IR GDA embolization  - 7/18, patient had large melanotic stool with Hgb drop from 10.1-7.1, patient was restarted on henny, vaso, and levo for reintubation for GI scope; s/p 2pRBC, 1 platelet  - Total transfusions: 16 pRBC, 4 platelet, 5 FFP  Findings:  - AM cortisol 17 (7/5), AM cortisol 37.1 (7/11)  - POCUS ECHO (7/16) showed no cardiogenic shock, no tamponade, low SV indeterminate IVC, irregular B lines but not concerning for pulmonary edema, and some subdiaphragmatic fluid  Plan:  - Continue to monitor CBC, transfuse for Hgb <7  - Off pressors since 0300 on 7/30  - Continue to hold Plavix 75 mg  - decreased hydrocortisone 50mg BID to QD for refractory shock    #CAD s/p PCI x3, most recent stent 6/12/24, NURIA to pLAD (stable)  Course:  - 7/13 restarted plavix for new stent (6/12/24) per GI, 7/18 held plavix due to bleeding  - 7/22 started low-dose aspirin as safer alternative to plavix per cardiology, but d/c 7/23 as patient bled again  Plan:  - Continue to hold Plavix 75 mg and aspirin 81 mg  - Continue atorvastatin 80 mg daily if NGT replaced    #PAD  # A-fib on eliquis (worsening)  May have been worsened because of GI bleed  - s/p successful DCCV 10/31   - 7/16 In RVR, 2 doses of amio overnight; Cards recommended resuming home dose sotalol 40 mg PO (qTC wnl), s/p 1 dose sotalol but RVR persisted  - 7/16 Started amio loading  - 7/22 Added digoxin load for persistent HR 130s over past week  - 7/23 Dig level 1.3, patient's HR back in 60s in the afternoon  - 7/28 Patient pulled NG tube, unable to get several doses of amio, back in afib with RVR  - 7/30 NGT replaced - will restart amio  Plan:  - Hold eliquis   - Continue amio 200 mg daily  - Continue digoxin maintenance dose 125 mcg daily  - Hold home sotalol  - Cardiology following, advises against chemical cardioversion off AC if possible    #NSVT (stable)  Likely 2/2 cardiac structural damage given prior stents as well as electrolyte abnormalities while being diuresed. Troponins elevated but stable today. Troponins also elevated earlier in admission. EKG without ST elevations.  Plan:  - Continue to monitor  - Replete electrolytes as necessary    PULMONARY  #Intubated for airway protection prior to EGD/IR embolization (resolved)  Course:  - Extubated 7/22, SpO2 100% on RA, reintubated 7/27, Extubated 7/28    RENAL/  #RAZIA (resolved)  - Cr peaked at 3.38 (7/11)  - Currently Cr 1.13 ( 7/24)  Plan:  - Continue to monitor Cr    #ATN i.s.o hypotension  #metabolic acidosis (improving)  Patient was in a state of metabolic acidosis around 7/16, but recent ABG pH in normal range. However, patient has been bicarb deficient throughout the admission.   Plan:  - f/u nephro recs  - hold sodium bicarb 650mg tid considering improvement in hypernatremia and bicarb lvls  - Per nephro, patient is not a dialysis candidate  - trend BUN/Cr   - monitor Is and Os     #Hypernatremia (improving)  Patient having a gradually increasing sodium since transfer to the MICU. Na was improving by adding free water, lowering the bicarb drip, and diuresis with bumex/metolazone. Now downtrending, will hold diuresis.  Plan:  - stopped free water 300mL q6  - hold sodium bicarb 650mg TID given improvement  - hold bumex 1mg q12 IV, metolazone 5mg PO QD    #Urinary retention (improving)  Plan:  - Started Doxazosin 2 mg daily (7/15)  - Monitor I/Os  - Tavarez placed by urology 7/19, making urine  - Removed tavarez 7/30    GASTROINTESTINAL  #Upper GI Bleed (stable)  Course:  - 7/9 CT A/P - Active bleeding in the third portion of the duodenum. Progression of liver metastases.  - 7/9 EGD showed esophagitis, One non-bleeding duodenal ulcer with a clean ulcer base (Arjun Class III). One oozing duodenal ulcer with spurting hemorrhage (Arjun Class Ia). Treatment not successful. Treated with bipolar cautery.  - 7/9 s/p IR GDA embolization  - 7/18 Patient had melanotic stool with hemoglobin drop 10.1 to 7.1; EGD showing duodenal bleed s/p 3 clips + epi  - CTA (7/20) showed no active bleed  - H. pylori negative  - Total EGDs: 3  Plan:  - Continue pantoprazole 40 mg IV q12  - monitor Hgb, transfuse as needed for Hgb <7  - continue to f/u with GI and IR     - Per GI, "Will need PPI BID for 8 weeks for grade D esophagitis and PUD"    - Per GI, high risk of 30 day rebleed (>10-20% risk)  - No acute IR intervention per IR because no active area of extravasation on imaging (7/21)  - No acute GI intervention per GI (7/21)  - Surgery evaluated patient 7/23, may be a candidate for duodenotomy to oversew ulcers, but patient cannot be on aspirin  - Reached out to GI (7/26) about longer-term goals including risk of not being on aspirin vs bleeding;  ·	Per GI, still a risks vs benefits situation as he may bleed again with ASA, patient may require a surgical intervention next  ·	As per GI, started on octreotide 250mg IV TID    #Nutrition  Course:  - FEES (7/17) failed  - ENT consulted for vallecular lesion, diagnosed vallecular cyst, no inpatient intervention required, outpatient f/u  - Patient currently extubated (7/24)  - FEES failed 7/29; patient refused evaluation  - NGT replaced today (7/30)- was given 1mg ativan, nasal lidocaine gel, affrin prior to NGT insertion    Plan:  - will resume tube feeds and multivitamin per RD now that NGT is replaced    #Diarrhea (resolved)  Course:  - c. diff + (7/14)  - vancomycin (7/14 -7/23) active infection dose, vancomycin prophylaxis (7/24-7/25)   Plan:  - No longer on vancomycin  - Infection prevention says contact precautions continued due to mucoid BMs    #Transaminitis (worsened)  Findings:  - Bili 1.1 (7/16) -> 1.3 (7/17) ->0.5 (7/24) -> 0.6 (7/28)  - Alk P 359 (7/16)-> 484 (7/17) -> 174 (7/24) -> 229 (7/28)  -  (7/16)->129 (7/17) -> 75 (7/24) -> 65 (7/28)  - ALT 67 (7/16)-> 72 (7/17) -> 30 (7/24) -> 229 (7/28)  - RUQ US (7/17): known liver mets, no acute findings  Plan:  - Continue atorvastatin 80 mg daily    INFECTIOUS DISEASE  #leukocytosis (resolved)  Cultures:  - Blood cultures negative throughout admission  - Sputum Cx from ETT 7/13 showed numerous gram neg krishna (Klebsiella oxytoca/raoultella ornithinolytica)  - GI PCR negative  - MRSA swab negative  - 7/29 sputum culture growing P aeruginosa   Antibiotics Course:  - Vancomycin (7/14 - 7/23), Vancomycin Prophylaxis (7/24 -7/25)  - Zosyn (7/11-7/18), resistance shown, switched to meropenem  - Meropenem (7/18-7/25)  Plan:  - No longer on antibiotics  - Continue to monitor for fevers  - consider abx pending sensitivities iso cough    ENDOCRINE  #adrenal insufficiency (stable)  Findings:  - 7/5 cortisol 17   - 7/11 cortisol 37.1   Plan:  - decreased hydrocortisone 50mg IV from BID to QD  - Monitor sugars with steroid    HEMATOLOGY/ONCOLOGY   # neuroendocrine tumor (unchanged)  - was on lanreotide then had POD in liver and started on peptide receptor radiotherapy (PRRT)- typically given every 8 weeks for 4 doses. He last received it 10/20/2023   - PET 5/28/24 shows new somatostatin avid osseous lesions; decreased intensely avid right supradiaphragmatic and upper abdominal nodes, suspicious for mets  - Gastrin level 5509  Plan:  - per heme/onc:    - can resume octreotide 150 mcg bid once infection r/o      - can check factor levels V, VIII, X     - "f/u with Dr. Sophia Prasad at [facility] [place] after discharge, no plan for treatment inpatient, if clinically improves/stabilizes then can be considered for treatment outpatient"    #Normocytic anemia (stable)  - Pt with low Hgb 7-9  - ISO of GI bleed hx and continued melanotic BMs, there is c/o of rebleed; per GI, high risk of rebleeding, currently seems to be slow bleeding   Plan:  - Surgery and GI made aware & are following   - Continue CBC q12 unless having large melanotic stools    #DVT ppx (unchanged)  - SCDs  - LE duplex (7/17): negative for DVT    SKINS:   - Lines/Tubes - PIV, cordis replaced with central line (7/14-7/23), A line (7/20-7/29), Subclavian central line (7/24- )  - Removed A-line (R axillary) 7/29  - Removed tavarez 7/30    Ethics:   - Full Code   - GOC 7/12, spoke to spouse (Yamilet) over phone with palliative care team, discussed GOC again on 7/16  - 7/29 - Patient expressed that he wants to die, refuses to see psych, equivocating on DNR status. However, agreed to replace NGT. Continue GOC discussion.    Consults this admission: GI, Heme Onc, Palliative, Endocrinology, Cardiology, Nephrology, Interventional Radiology, General Surgery   Assessment	  Assessment: 77 yo male with metastatic neuroendocrine pancreatic cancer (tx on hold) and PMH of CAD s/p PCI x3 with most recent stent 6/12/24 on Plavix and eliquis , chronic Afib on eliquis, orthostatic hypotension on midodrine, PAD , recent admission for dx cath on 6/24/24 presented from PCP's office for hypotension despite taking AM midodrine dose on 7/2, admitted to medicine for symptomatic hypotension and RAZIA. Per chart, on 7/8 PM, pt was noted to have acute onset of melena x5 and coffee ground emesis x2-3. RRT was called on 7/9 AM for hypotension, hgb dropped from 9.2 to 7.4 (admission baseline 10-11), FOBT +, pt was started on PPI IV and transfused 1 unit of pRBC. GI was consulted and underwent EGD on 7/9 afternoon. MICU was consulted for uncontrolled bleeding during EGD. During EGD, pt became hypotensive and was given phenylephrine, had A-fib with RVR s/p amiodarone. Now s/p IR GDA embolization, admitted to MICU for further monitoring i.s.o hemorrhagic shock 2/2 GI bleed. On repeat EGD, oozing but no active bleeding was noted. Hospital course c/b c.diff, and patient has been placed on vancomycin. Patient had melanotic stool 7/18 with Hgb drop 10.1 to 7.1 and received 2 units pRBC and 1 unit platelets. Patient was reintubated and underwent EGD 7/18 afternoon. GI found duodenal bleed. Patient now s/p 3x clips and epi. Since 7/18 patient has continued to have melanotic stools with hemoglobin drops concerning for slow rebleeding. Currently, discussion revolves around the risks and benefits of antiplatelet therapy for recent stent vs risk of GI rebleed, and potential to undergo surgery.    - Total transfusions: 17 pRBC, 4 platelet, 5 FFP (as of 7/28)    Plan:     NEUROLOGIC  # Baseline AOx3 (waxing/waning)  Course:  - intubated 7/9, extubated 7/15  - reintubated 7/18 for EGD, extubated 7/22  -reintubated 7/23, extubated  - not sedated  - Currently AOx2-3  Plan:  - Delirium precautions    CARDIOVASCULAR  # hemorrhagic shock (worsened)  Course:  - Per spouse, pt's baseline BP is a little bit over 100  - 7/9: RRT called 7/9 for hypotension; 2/2 to Upper GI bleeds, urgently took to EGD, found bleeding ulcer that was not well controlled, underwent IR GDA embolization  - 7/18, patient had large melanotic stool with Hgb drop from 10.1-7.1, patient was restarted on henny, vaso, and levo for reintubation for GI scope; s/p 2pRBC, 1 platelet  - Total transfusions: 16 pRBC, 4 platelet, 5 FFP  Findings:  - AM cortisol 17 (7/5), AM cortisol 37.1 (7/11)  - POCUS ECHO (7/16) showed no cardiogenic shock, no tamponade, low SV indeterminate IVC, irregular B lines but not concerning for pulmonary edema, and some subdiaphragmatic fluid  Plan:  - Continue to monitor CBC, transfuse for Hgb <7  - Off pressors since 0300 on 7/30  - Continue to hold Plavix 75 mg  - decreased hydrocortisone 50mg BID to QD for refractory shock    #CAD s/p PCI x3, most recent stent 6/12/24, NURIA to pLAD (stable)  Course:  - 7/13 restarted plavix for new stent (6/12/24) per GI, 7/18 held plavix due to bleeding  - 7/22 started low-dose aspirin as safer alternative to plavix per cardiology, but d/c 7/23 as patient bled again  Plan:  - Continue to hold Plavix 75 mg and aspirin 81 mg  - Continue atorvastatin 80 mg daily if NGT replaced    #PAD  # A-fib on eliquis (worsening)  May have been worsened because of GI bleed  - s/p successful DCCV 10/31   - 7/16 In RVR, 2 doses of amio overnight; Cards recommended resuming home dose sotalol 40 mg PO (qTC wnl), s/p 1 dose sotalol but RVR persisted  - 7/16 Started amio loading  - 7/22 Added digoxin load for persistent HR 130s over past week  - 7/23 Dig level 1.3, patient's HR back in 60s in the afternoon  - 7/28 Patient pulled NG tube, unable to get several doses of amio, back in afib with RVR  - 7/30 NGT replaced - will restart amio  Plan:  - Hold eliquis   - Continue amio 200 mg daily  - Continue digoxin maintenance dose 125 mcg daily  - Hold home sotalol  - Cardiology following, advises against chemical cardioversion off AC if possible    #NSVT (stable)  Likely 2/2 cardiac structural damage given prior stents as well as electrolyte abnormalities while being diuresed. Troponins elevated but stable today. Troponins also elevated earlier in admission. EKG without ST elevations.  Plan:  - Continue to monitor  - Replete electrolytes as necessary    PULMONARY  #Intubated for airway protection prior to EGD/IR embolization (resolved)  Course:  - Extubated 7/22, SpO2 100% on RA, reintubated 7/27, Extubated 7/28    RENAL/  #RAZIA (resolved)  - Cr peaked at 3.38 (7/11)  - Currently Cr 1.13 ( 7/24)  Plan:  - Continue to monitor Cr    #ATN i.s.o hypotension  #metabolic acidosis (improving)  Patient was in a state of metabolic acidosis around 7/16, but recent ABG pH in normal range. However, patient has been bicarb deficient throughout the admission.   Plan:  - f/u nephro recs  - hold sodium bicarb 650mg tid considering improvement in hypernatremia and bicarb lvls  - Per nephro, patient is not a dialysis candidate  - trend BUN/Cr   - monitor Is and Os     #Hypernatremia (improving)  Patient having a gradually increasing sodium since transfer to the MICU. Na was improving by adding free water, lowering the bicarb drip, and diuresis with bumex/metolazone. Now downtrending, will hold diuresis.  Plan:  - stopped free water 300mL q6  - hold sodium bicarb 650mg TID given improvement  - hold bumex 1mg q12 IV, metolazone 5mg PO QD    #Urinary retention (improving)  Plan:  - Started Doxazosin 2 mg daily (7/15)  - Monitor I/Os  - Tavarez placed by urology 7/19, making urine  - Removed tavarez 7/30    GASTROINTESTINAL  #Upper GI Bleed (stable)  Course:  - 7/9 CT A/P - Active bleeding in the third portion of the duodenum. Progression of liver metastases.  - 7/9 EGD showed esophagitis, One non-bleeding duodenal ulcer with a clean ulcer base (Arjun Class III). One oozing duodenal ulcer with spurting hemorrhage (Arjun Class Ia). Treatment not successful. Treated with bipolar cautery.  - 7/9 s/p IR GDA embolization  - 7/18 Patient had melanotic stool with hemoglobin drop 10.1 to 7.1; EGD showing duodenal bleed s/p 3 clips + epi  - CTA (7/20) showed no active bleed  - H. pylori negative  - Total EGDs: 3  Plan:  - Continue pantoprazole 40 mg IV q12  - monitor Hgb, transfuse as needed for Hgb <7  - continue to f/u with GI and IR     - Per GI, "Will need PPI BID for 8 weeks for grade D esophagitis and PUD"    - Per GI, high risk of 30 day rebleed (>10-20% risk)  - No acute IR intervention per IR because no active area of extravasation on imaging (7/21)  - No acute GI intervention per GI (7/21)  - Surgery evaluated patient 7/23, may be a candidate for duodenotomy to oversew ulcers, but patient cannot be on aspirin  - Reached out to GI (7/26) about longer-term goals including risk of not being on aspirin vs bleeding;  ·	Per GI, still a risks vs benefits situation as he may bleed again with ASA, patient may require a surgical intervention next  ·	As per GI, started on octreotide 250mg IV TID    #Nutrition  Course:  - FEES (7/17) failed  - ENT consulted for vallecular lesion, diagnosed vallecular cyst, no inpatient intervention required, outpatient f/u  - Patient currently extubated (7/24)  - FEES failed 7/29; patient refused evaluation  - NGT replaced today (7/30)- was given 1mg ativan, nasal lidocaine gel, affrin prior to NGT insertion    Plan:  - following NGT replacement today, will resume tube feeds and multivitamin per RD tomorrow    #Diarrhea (resolved)  Course:  - c. diff + (7/14)  - vancomycin (7/14 -7/23) active infection dose, vancomycin prophylaxis (7/24-7/25)   Plan:  - No longer on vancomycin  - Infection prevention says contact precautions continued due to mucoid BMs    #Transaminitis (worsened)  Findings:  - Bili 1.1 (7/16) -> 1.3 (7/17) ->0.5 (7/24) -> 0.6 (7/28)  - Alk P 359 (7/16)-> 484 (7/17) -> 174 (7/24) -> 229 (7/28)  -  (7/16)->129 (7/17) -> 75 (7/24) -> 65 (7/28)  - ALT 67 (7/16)-> 72 (7/17) -> 30 (7/24) -> 229 (7/28)  - RUQ US (7/17): known liver mets, no acute findings  Plan:  - Continue atorvastatin 80 mg daily    INFECTIOUS DISEASE  #leukocytosis (resolved)  Cultures:  - Blood cultures negative throughout admission  - Sputum Cx from ETT 7/13 showed numerous gram neg krishna (Klebsiella oxytoca/raoultella ornithinolytica)  - GI PCR negative  - MRSA swab negative  - 7/29 sputum culture growing P aeruginosa   Antibiotics Course:  - Vancomycin (7/14 - 7/23), Vancomycin Prophylaxis (7/24 -7/25)  - Zosyn (7/11-7/18), resistance shown, switched to meropenem  - Meropenem (7/18-7/25)  Plan:  - No longer on antibiotics  - Continue to monitor for fevers  - consider abx pending sensitivities iso cough    ENDOCRINE  #adrenal insufficiency (stable)  Findings:  - 7/5 cortisol 17   - 7/11 cortisol 37.1   Plan:  - decreased hydrocortisone 50mg IV from BID to QD  - Monitor sugars with steroid    HEMATOLOGY/ONCOLOGY   # neuroendocrine tumor (unchanged)  - was on lanreotide then had POD in liver and started on peptide receptor radiotherapy (PRRT)- typically given every 8 weeks for 4 doses. He last received it 10/20/2023   - PET 5/28/24 shows new somatostatin avid osseous lesions; decreased intensely avid right supradiaphragmatic and upper abdominal nodes, suspicious for mets  - Gastrin level 5509  Plan:  - per heme/onc:    - can resume octreotide 150 mcg bid once infection r/o      - can check factor levels V, VIII, X     - "f/u with Dr. Sophia Prasad at [facility] [place] after discharge, no plan for treatment inpatient, if clinically improves/stabilizes then can be considered for treatment outpatient"    #Normocytic anemia (stable)  - Pt with low Hgb 7-9  - ISO of GI bleed hx and continued melanotic BMs, there is c/o of rebleed; per GI, high risk of rebleeding, currently seems to be slow bleeding   Plan:  - Surgery and GI made aware & are following   - Continue CBC q12 unless having large melanotic stools    #DVT ppx (unchanged)  - SCDs  - LE duplex (7/17): negative for DVT    SKINS:   - Lines/Tubes - PIV, cordis replaced with central line (7/14-7/23), A line (7/20-7/29), Subclavian central line (7/24- )  - Removed A-line (R axillary) 7/29  - Removed tavarez 7/30    Ethics:   - Full Code   - GOC 7/12, spoke to spouse (Yamilet) over phone with palliative care team, discussed GOC again on 7/16  - 7/29 - Patient expressed that he wants to die, refuses to see psych, equivocating on DNR status. However, agreed to replace NGT. Continue GOC discussion.    Consults this admission: GI, Heme Onc, Palliative, Endocrinology, Cardiology, Nephrology, Interventional Radiology, General Surgery   Assessment	  Assessment: 77 yo male with metastatic neuroendocrine pancreatic cancer (tx on hold) and PMH of CAD s/p PCI x3 with most recent stent 6/12/24 on Plavix and eliquis , chronic Afib on eliquis, orthostatic hypotension on midodrine, PAD , recent admission for dx cath on 6/24/24 presented from PCP's office for hypotension despite taking AM midodrine dose on 7/2, admitted to medicine for symptomatic hypotension and RAZIA. Per chart, on 7/8 PM, pt was noted to have acute onset of melena x5 and coffee ground emesis x2-3. RRT was called on 7/9 AM for hypotension, hgb dropped from 9.2 to 7.4 (admission baseline 10-11), FOBT +, pt was started on PPI IV and transfused 1 unit of pRBC. GI was consulted and underwent EGD on 7/9 afternoon. MICU was consulted for uncontrolled bleeding during EGD. During EGD, pt became hypotensive and was given phenylephrine, had A-fib with RVR s/p amiodarone. Now s/p IR GDA embolization, admitted to MICU for further monitoring i.s.o hemorrhagic shock 2/2 GI bleed. On repeat EGD, oozing but no active bleeding was noted. Hospital course c/b c.diff, and patient has been placed on vancomycin. Patient had melanotic stool 7/18 with Hgb drop 10.1 to 7.1 and received 2 units pRBC and 1 unit platelets. Patient was reintubated and underwent EGD 7/18 afternoon. GI found duodenal bleed. Patient now s/p 3x clips and epi. Since 7/18 patient has continued to have melanotic stools with hemoglobin drops concerning for slow rebleeding. Currently, discussion revolves around the risks and benefits of antiplatelet therapy for recent stent vs risk of GI rebleed, and potential to undergo surgery.    - Total transfusions: 17 pRBC, 4 platelet, 5 FFP (as of 7/28)    Plan:     NEUROLOGIC  # Baseline AOx3 (waxing/waning)  Course:  - intubated 7/9, extubated 7/15  - reintubated 7/18 for EGD, extubated 7/22  -reintubated 7/23, extubated  - not sedated  - Currently AOx2-3  Plan:  - Delirium precautions    CARDIOVASCULAR  # hemorrhagic shock (worsened)  Course:  - Per spouse, pt's baseline BP is a little bit over 100  - 7/9: RRT called 7/9 for hypotension; 2/2 to Upper GI bleeds, urgently took to EGD, found bleeding ulcer that was not well controlled, underwent IR GDA embolization  - 7/18, patient had large melanotic stool with Hgb drop from 10.1-7.1, patient was restarted on henny, vaso, and levo for reintubation for GI scope; s/p 2pRBC, 1 platelet  - Total transfusions: 16 pRBC, 4 platelet, 5 FFP  Findings:  - AM cortisol 17 (7/5), AM cortisol 37.1 (7/11)  - POCUS ECHO (7/16) showed no cardiogenic shock, no tamponade, low SV indeterminate IVC, irregular B lines but not concerning for pulmonary edema, and some subdiaphragmatic fluid  Plan:  - Continue to monitor CBC, transfuse for Hgb <7  - Off pressors since 0300 on 7/30  - Continue to hold Plavix 75 mg  - decreased hydrocortisone 50mg BID to QD for refractory shock    #CAD s/p PCI x3, most recent stent 6/12/24, NURIA to pLAD (stable)  Course:  - 7/13 restarted plavix for new stent (6/12/24) per GI, 7/18 held plavix due to bleeding  - 7/22 started low-dose aspirin as safer alternative to plavix per cardiology, but d/c 7/23 as patient bled again  Plan:  - Continue to hold Plavix 75 mg and aspirin 81 mg  - Resume atorvastatin 80 mg daily via NGT     #PAD  # A-fib on eliquis (worsening)  May have been worsened because of GI bleed  - s/p successful DCCV 10/31   - 7/16 In RVR, 2 doses of amio overnight; Cards recommended resuming home dose sotalol 40 mg PO (qTC wnl), s/p 1 dose sotalol but RVR persisted  - 7/16 Started amio loading  - 7/22 Added digoxin load for persistent HR 130s over past week  - 7/23 Dig level 1.3, patient's HR back in 60s in the afternoon  - 7/28 Patient pulled NG tube, unable to get several doses of amio, back in afib with RVR  - 7/30 NGT replaced - will restart amio  Plan:  - Hold eliquis   - Continue digoxin maintenance dose 125 mcg daily  - Hold home sotalol  - Cardiology following, advises against chemical cardioversion off AC if possible  - Resume amiodarone 200 mg daily via NGT    #NSVT (stable)  Likely 2/2 cardiac structural damage given prior stents as well as electrolyte abnormalities while being diuresed. Troponins elevated but stable today. Troponins also elevated earlier in admission. EKG without ST elevations.  Plan:  - Continue to monitor  - Replete electrolytes as necessary    PULMONARY  #Intubated for airway protection prior to EGD/IR embolization (resolved)  Course:  - Extubated 7/22, SpO2 100% on RA, reintubated 7/27, Extubated 7/28    RENAL/  #RAZIA (resolved)  - Cr peaked at 3.38 (7/11)  - Currently Cr 1.13 ( 7/24)  Plan:  - Continue to monitor Cr    #ATN i.s.o hypotension  #metabolic acidosis (improving)  Patient was in a state of metabolic acidosis around 7/16, but recent ABG pH in normal range. However, patient has been bicarb deficient throughout the admission.   Plan:  - f/u nephro recs  - hold sodium bicarb 650mg tid considering improvement in hypernatremia and bicarb lvls  - Per nephro, patient is not a dialysis candidate  - trend BUN/Cr   - monitor Is and Os     #Hypernatremia (improving)  Patient having a gradually increasing sodium since transfer to the MICU. Na was improving by adding free water, lowering the bicarb drip, and diuresis with bumex/metolazone. Now downtrending, will hold diuresis.  Plan:  - stopped free water 300mL q6  - hold sodium bicarb 650mg TID given improvement  - hold bumex 1mg q12 IV, metolazone 5mg PO QD    #Urinary retention (improving)  Plan:  - Started Doxazosin 2 mg daily (7/15)  - Monitor I/Os  - Tavarez placed by urology 7/19, making urine  - Removed tavarez 7/30    GASTROINTESTINAL  #Upper GI Bleed (stable)  Course:  - 7/9 CT A/P - Active bleeding in the third portion of the duodenum. Progression of liver metastases.  - 7/9 EGD showed esophagitis, One non-bleeding duodenal ulcer with a clean ulcer base (Arjun Class III). One oozing duodenal ulcer with spurting hemorrhage (Arjun Class Ia). Treatment not successful. Treated with bipolar cautery.  - 7/9 s/p IR GDA embolization  - 7/18 Patient had melanotic stool with hemoglobin drop 10.1 to 7.1; EGD showing duodenal bleed s/p 3 clips + epi  - CTA (7/20) showed no active bleed  - H. pylori negative  - Total EGDs: 3  Plan:  - Continue pantoprazole 40 mg IV q12  - monitor Hgb, transfuse as needed for Hgb <7  - continue to f/u with GI and IR     - Per GI, "Will need PPI BID for 8 weeks for grade D esophagitis and PUD"    - Per GI, high risk of 30 day rebleed (>10-20% risk)  - No acute IR intervention per IR because no active area of extravasation on imaging (7/21)  - No acute GI intervention per GI (7/21)  - Surgery evaluated patient 7/23, may be a candidate for duodenotomy to oversew ulcers, but patient cannot be on aspirin  - Reached out to GI (7/26) about longer-term goals including risk of not being on aspirin vs bleeding;  ·	Per GI, still a risks vs benefits situation as he may bleed again with ASA, patient may require a surgical intervention next  ·	As per GI, started on octreotide 250mg IV TID    #Nutrition  Course:  - FEES (7/17) failed  - ENT consulted for vallecular lesion, diagnosed vallecular cyst, no inpatient intervention required, outpatient f/u  - Patient currently extubated (7/24)  - FEES failed 7/29; patient refused evaluation  - NGT replaced today (7/30)- was given 1mg ativan, nasal lidocaine gel, affrin prior to NGT insertion    Plan:  - following NGT replacement today, will resume tube feeds and multivitamin per RD tomorrow    #Diarrhea (resolved)  Course:  - c. diff + (7/14)  - vancomycin (7/14 -7/23) active infection dose, vancomycin prophylaxis (7/24-7/25)   Plan:  - No longer on vancomycin  - Infection prevention says contact precautions continued due to mucoid BMs    #Transaminitis (worsened)  Findings:  - Bili 1.1 (7/16) -> 1.3 (7/17) ->0.5 (7/24) -> 0.6 (7/28)  - Alk P 359 (7/16)-> 484 (7/17) -> 174 (7/24) -> 229 (7/28)  -  (7/16)->129 (7/17) -> 75 (7/24) -> 65 (7/28)  - ALT 67 (7/16)-> 72 (7/17) -> 30 (7/24) -> 229 (7/28)  - RUQ US (7/17): known liver mets, no acute findings  Plan:  - Continue atorvastatin 80 mg daily    INFECTIOUS DISEASE  #leukocytosis (resolved)  Cultures:  - Blood cultures negative throughout admission  - Sputum Cx from ETT 7/13 showed numerous gram neg krishna (Klebsiella oxytoca/raoultella ornithinolytica)  - GI PCR negative  - MRSA swab negative  - 7/29 sputum culture growing P aeruginosa   Antibiotics Course:  - Vancomycin (7/14 - 7/23), Vancomycin Prophylaxis (7/24 -7/25)  - Zosyn (7/11-7/18), resistance shown, switched to meropenem  - Meropenem (7/18-7/25)  Plan:  - No longer on antibiotics  - Continue to monitor for fevers  - If febrile start abx for pseudomonas    ENDOCRINE  #adrenal insufficiency (stable)  Findings:  - 7/5 cortisol 17   - 7/11 cortisol 37.1   Plan:  - decreased hydrocortisone 50mg IV from BID to QD  - Monitor sugars with steroid    HEMATOLOGY/ONCOLOGY   # neuroendocrine tumor (unchanged)  - was on lanreotide then had POD in liver and started on peptide receptor radiotherapy (PRRT)- typically given every 8 weeks for 4 doses. He last received it 10/20/2023   - PET 5/28/24 shows new somatostatin avid osseous lesions; decreased intensely avid right supradiaphragmatic and upper abdominal nodes, suspicious for mets  - Gastrin level 5509  Plan:  - per heme/onc:    - can resume octreotide 150 mcg bid once infection r/o      - can check factor levels V, VIII, X     - "[place] [facility] [place] no plan for treatment inpatient, if clinically improves/stabilizes then can be considered for treatment outpatient"    #Normocytic anemia (stable)  - Pt with low Hgb 7-9  - ISO of GI bleed hx and continued melanotic BMs, there is c/o of rebleed; per GI, high risk of rebleeding, currently seems to be slow bleeding   Plan:  - Surgery and GI made aware & are following   - Continue CBC q12 unless having large melanotic stools    #DVT ppx (unchanged)  - SCDs  - LE duplex (7/17): negative for DVT    SKINS:   - Lines/Tubes - PIV, cordis replaced with central line (7/14-7/23), A line (7/20-7/29), Subclavian central line (7/24- )  - Removed A-line (R axillary) 7/29  - Removed tavarez 7/30    Ethics:   - Full Code   - GOC 7/12, spoke to spouse (Yamilet) over phone with palliative care team, discussed GOC again on 7/16  - 7/29 - Patient expressed that he wants to die, refuses to see psych, equivocating on DNR status. However, agreed to replace NGT. Continue GOC discussion.    Consults this admission: GI, Heme Onc, Palliative, Endocrinology, Cardiology, Nephrology, Interventional Radiology, General Surgery

## 2024-07-30 NOTE — PROGRESS NOTE ADULT - SUBJECTIVE AND OBJECTIVE BOX
Patient is a 78y old  Male who presents with a chief complaint of hypotension (30 Jul 2024 07:33)    Patient seen and examined  No new complaints, now agreeable to NGT    MEDICATIONS  (STANDING):  atorvastatin 80 milliGRAM(s) Oral at bedtime  chlorhexidine 2% Cloths 1 Application(s) Topical <User Schedule>  chlorhexidine 4% Liquid 1 Application(s) Topical <User Schedule>  digoxin     Tablet 125 MICROGram(s) Oral daily  doxazosin 2 milliGRAM(s) Oral at bedtime  folic acid Injectable 1 milliGRAM(s) IV Push daily  hydrocortisone sodium succinate Injectable 50 milliGRAM(s) IV Push daily  insulin lispro (ADMELOG) corrective regimen sliding scale   SubCutaneous every 6 hours  multivitamin 1 Tablet(s) Oral daily  octreotide  Injectable 250 MICROGram(s) IV Push three times a day  pantoprazole  Injectable 40 milliGRAM(s) IV Push every 12 hours  potassium chloride   Solution 40 milliEquivalent(s) Oral once  potassium chloride  20 mEq/100 mL IVPB 20 milliEquivalent(s) IV Intermittent every 2 hours  sodium bicarbonate 650 milliGRAM(s) Oral three times a day  thiamine Injectable 100 milliGRAM(s) IV Push daily    MEDICATIONS  (PRN):  sodium chloride 0.9% lock flush 10 milliLiter(s) IV Push every 1 hour PRN Pre/post blood products, medications, blood draw, and to maintain line patency        Vital Signs Last 24 Hrs  T(C): 36.8 (30 Jul 2024 08:00), Max: 37.5 (29 Jul 2024 20:00)  T(F): 98.2 (30 Jul 2024 08:00), Max: 99.5 (29 Jul 2024 20:00)  HR: 96 (30 Jul 2024 11:00) (76 - 107)  BP: 100/53 (30 Jul 2024 11:00) (93/49 - 125/58)  BP(mean): 72 (30 Jul 2024 11:00) (69 - 86)  RR: 21 (30 Jul 2024 11:00) (18 - 45)  SpO2: 98% (30 Jul 2024 11:00) (94% - 100%)    Parameters below as of 30 Jul 2024 10:44  Patient On (Oxygen Delivery Method): room air        PE  Awake, alert  Anicteric, MMM  RRR  CTAB  anasarca  No c/c                          8.6    8.20  )-----------( 124      ( 30 Jul 2024 11:45 )             26.9       07-30    144  |  107  |  29<H>  ----------------------------<  122<H>  2.8<LL>   |  24  |  0.84    Ca    7.6<L>      30 Jul 2024 11:45  Phos  3.0     07-30  Mg     1.9     07-30    TPro  4.3<L>  /  Alb  2.3<L>  /  TBili  0.8  /  DBili  x   /  AST  47<H>  /  ALT  31  /  AlkPhos  197<H>  07-30

## 2024-07-30 NOTE — PROCEDURE NOTE - NSINDICATIONS_GEN_A_CORE
critical patient/monitoring purposes
airway protection
critical illness
critical illness
phimosis, edema/strict intake/output during critical illness
feeding tube replacement

## 2024-07-30 NOTE — PROGRESS NOTE ADULT - ATTENDING COMMENTS
1. Hypotension:  Now off pressor for 6 hours . No  evidence of current infection.  Decrease hydrocortisone to 50mg daily.  No evidence of active bleeding at this time.     2.GI bleed; S/P embolization of GDA. Duodenal ulcer found on EGD.  Still with some melena. H/H stable. Continue PPI    3. Atrial fib . Rate controlled with amiodarone drip. No AC.    4. Neuroendocrine tumor.  with / mets to liver.     5. DVT prophylaxis ; SCD    6. GOC: Full code.      7. NGT placed. Restart PO amiodarone and statin. H/O CAD with recent stent. . Hold DAPT due to multiple GI bleeds. Hold Bumex.

## 2024-07-30 NOTE — PROCEDURE NOTE - ADDITIONAL PROCEDURE DETAILS
NGT placement performed by Pablo Lai MS4 with assistance by Dr. Sauer, Dr. Estela Amezquita, and Nurse Veda. The patient was prepped with 2 mg ativan IV push, intranasal Affrin, and intranasal viscous lidocaine to improve tolerance of procedure. NGT was placed with one attempt and no complications. Placement was confirmed at bedside with positive insufflation test; pending CXR.
Patient in MICU critically ill, nurse unable to pass catheter due to phimosis and penile edema

## 2024-07-30 NOTE — PROGRESS NOTE ADULT - SUBJECTIVE AND OBJECTIVE BOX
DATE OF SERVICE: 07-30-24 @ 18:32    Patient is a 78y old  Male who presents with a chief complaint of hypotension (30 Jul 2024 14:06)      INTERVAL HISTORY: In no acute distress. Alert and responsive. Wife Yamilet at bedside.     REVIEW OF SYSTEMS:  CONSTITUTIONAL: No weakness  EYES/ENT: No visual changes;  No throat pain   NECK: No pain or stiffness  RESPIRATORY: No cough, wheezing; No shortness of breath  CARDIOVASCULAR: No chest pain or palpitations  GASTROINTESTINAL: No abdominal  pain. No nausea, vomiting, or hematemesis  GENITOURINARY: No dysuria, frequency or hematuria  NEUROLOGICAL: No stroke like symptoms  SKIN: No rashes    TELEMETRY Personally reviewed: AF   	  MEDICATIONS:  digoxin     Tablet 125 MICROGram(s) Oral daily  doxazosin 2 milliGRAM(s) Oral at bedtime        PHYSICAL EXAM:  T(C): 36.4 (07-30-24 @ 16:00), Max: 37.5 (07-29-24 @ 20:00)  HR: 95 (07-30-24 @ 17:00) (76 - 118)  BP: 106/53 (07-30-24 @ 17:00) (86/59 - 125/58)  RR: 24 (07-30-24 @ 17:00) (18 - 45)  SpO2: 100% (07-30-24 @ 17:00) (94% - 100%)  Wt(kg): --  I&O's Summary    29 Jul 2024 07:01  -  30 Jul 2024 07:00  --------------------------------------------------------  IN: 2635.1 mL / OUT: 3076 mL / NET: -440.9 mL    30 Jul 2024 07:01  -  30 Jul 2024 18:32  --------------------------------------------------------  IN: 450 mL / OUT: 600 mL / NET: -150 mL          Appearance: In no distress	  HEENT:    PERRL, EOMI	  Cardiovascular:  S1 S2, No JVD  Respiratory: Lungs clear to auscultation	  Gastrointestinal:  Soft, Non-tender, + BS	  Vascularature:  anasarca  Psychiatric: Appropriate affect   Neuro: no acute focal deficits                               8.6    8.20  )-----------( 124      ( 30 Jul 2024 11:45 )             26.9     07-30    144  |  107  |  29<H>  ----------------------------<  122<H>  2.8<LL>   |  24  |  0.84    Ca    7.6<L>      30 Jul 2024 11:45  Phos  3.0     07-30  Mg     1.9     07-30    TPro  4.3<L>  /  Alb  2.3<L>  /  TBili  0.8  /  DBili  x   /  AST  47<H>  /  ALT  31  /  AlkPhos  197<H>  07-30        Labs personally reviewed      ASSESSMENT/PLAN: 	    78-year-old male multiple medical problems history of hepatocellular carcinoma status post radiation therapy, AF s/p DCCV on Eliquis who was found to be hypotensive following NST. Patient has reported general weakness, fatigue, lightheadedness since being discharged from hospital. Reports mild chest discomfort in midsternal region. Reports dyspnea with minimal exertion. Also reports significant lack of appetite and poor PO intake. No dark or bloody stool, nausea or vomiting.       Problem/Plan - 1:  ·  Problem: Hypotension  ·  Plan: Continue with pressors in MICU  - Patient presents with hypotension and also RAZIA. Has known orthostatic hypotension.   - Patient and wife report decreased PO intake likely 2/2 malignancy.  - GIB 7/9, s/p 4 units prbc, IR for mesenteric embolization, now in MICU on pressors  - c/w Midodrine, droxidopa      Problem/Plan - 2:  ·  Problem: CAD.   ·  Plan: Recent PCI to pLAD in June 2023  - ECG non-ischemic  - Plavix held given large melena; ideally should be on Plavix but high risk to resume      Problem/Plan - 3:  ·  Problem: Atrial Fibrillation  - s/p succesful DCCV 10/31  - Hold Eliquis 5mg BID given GIB  - s/p Amio  - c/w dig 125mcg PO daily  - However would advise against chemical cardioversion off AC if possible          KIRSTIE Latham DO [facility]  Cardiovascular Medicine  [address] [place] [address], [address]  Available through call or text on [employer]  Office: [phone]

## 2024-07-30 NOTE — PROGRESS NOTE ADULT - ASSESSMENT
77 yo male with PMH of CAD s/p PCI x3 with most recent stent 6/12/24 on Plavix, chronic Afib on eliquis, orthostatic hypotension on midodrine, PAD, neuroendocrine tumor with RT currently on hold, recent admission for dx cath on 6/24/24 who presented for hypotension, admitted for GIB and hemorrhagic shock. Found to be bleeding from duodenum. Transferred to MICU, on pressors.     1. Pancreatic NET- metastatic   -- was on lanreotide then had POD in liver and started on peptide receptor radiotherapy (PRRT)- typically given every 8 weeks for 4 doses. He received one dose on 10/20/2023 and planned to get his 2nd dose at the end of December however his course was complicated by multiple hospitalizations that were noncancer related (decompensated heart failure).   -- 5/28/24 PET Dotatate (compared to 9/2023): several new somatostatin avid osseous lesions, some faintly sclerotic, suspicious for mets. Increased upper RP nodes, probably metastatic. Decreased intensely tracer avid right supradiaphragmatic and upper abdominal nodes, suspicious for metastasis. Redemonstrated intensely somatostatin avid bilobar hepatic lesions, consistent with metastases again morphologically difficult to delineate.   -- CT reviewed by [facility]: SINCE MAY 8 2024 INCREASED HEPATIC METASTASES, NEWLY SEEN PRIMARY TUMOR IN THE PANCREAS, and active bleeding within the duodenum with associated intraluminal hematoma.   -- chromogranin [place] is elevated at 2058, but [place] [facility] [place]   -- f[place]u with [place]. Sophia Praasd at [facility] [place], no plan for chemotherapy inpatient, if clinically improves/stabilizes then can be considered for treatment outpatient  --started on octreotide 250mcg TID    2. Duodenal bleed, Hemorrhagic shock  - Remains in MICU, s/p extubation 7/22. NG tube in place.  - CT w/ bleed in duodenum. GI called, EGD 7/9 -> S/p  IR embolization 7/9, intubated in MICU -> s/p extubation 7/15 -> intubated 7/18  - s/p multiple transfusions, fibrinogen low would recommend Cry for < 100, but coags have improved as are essentially normal now so unlikely, probably was related to liver dysfunction.   - s/p repeat EGD 7/12 showing duodenal lesions w/clots and oozing, no clear targets for intervention and no active bleeding, purastat applied to all areas of oozing.   - S/p EGD 7/18: duodenal ulcer with active oozing, ulcer with visible vessel. Bleeding treated.   - s/p multiple transfusions, per GI, high risk for rebleed, will continue PPI.   - Repeat CT AP w/ No evidence of GI bleed.  Interval endoscopic versus surgical intervention with metallic streak artifact suggestive of surgical clips in the region of the proximal one third portions of the duodenum. Evaluation of the previously seen hematoma is limited secondary to this artifact. Moderate bilateral pleural effusions and associated compressive atelectasis, right greater than left, overall slightly increased from previous CT. Anasarca.  - Per IR, In the absences of any active extravasation on CTA there's no place to target for an embolization  - GIB now stabilized, surgery continues to monitor. If significant recurrence of UGIB, surgery may consider emergent open duodenotomy   - now off IV abx  - started on octreotide 250 mcg TID    3. C. diff   - diarrhea now resolved   - cont. on isolation, and abx    4. Safety  - pt verbalizing suicidal ideations  - refusing psych       Christen Rosales NP  Hematology/ Oncology  [facility]  [phone] (office)  [phone] (alt office)  Evenings and weekends please call MD on call or office

## 2024-07-31 NOTE — PROGRESS NOTE ADULT - ATTENDING COMMENTS
1. Hypotension:  Now off pressor for 6 hours . No  evidence of current infection.  Decrease hydrocortisone to 25mg daily.  No evidence of active bleeding at this time.    2.GI bleed; S/P embolization of GDA. Duodenal ulcer found on EGD.  Still with some melena. H/H stable. Continue PPI    3. Atrial fib . Rate controlled. Start digoxin.. No AC.    4. Neuroendocrine tumor.  with / mets to liver.  Continue octreotide.    5. DVT prophylaxis ; SCD    6. GOC: Full code.      7. NGT placed. Restart PO amiodarone and statin. H/O CAD with recent stent. . Hold DAPT due to multiple GI bleeds. Hold Bumex.    8. Neuro. More somnolent today. VBG with PG 7.27. Check ABG

## 2024-07-31 NOTE — PROGRESS NOTE ADULT - ASSESSMENT
79 yo male with PMH of CAD s/p PCI x3 with most recent stent 6/12/24 on Plavix, chronic Afib on eliquis, orthostatic hypotension on midodrine, PAD, neuroendocrine tumor with RT currently on hold, recent admission for dx cath on 6/24/24 who presented for hypotension, admitted for GIB and hemorrhagic shock. Found to be bleeding from duodenum. Transferred to MICU, on pressors.     1. Pancreatic NET- metastatic   -- was on lanreotide then had POD in liver and started on peptide receptor radiotherapy (PRRT)- typically given every 8 weeks for 4 doses. He received one dose on 10/20/2023 and planned to get his 2nd dose at the end of December however his course was complicated by multiple hospitalizations that were noncancer related (decompensated heart failure).   -- 5/28/24 PET Dotatate (compared to 9/2023): several new somatostatin avid osseous lesions, some faintly sclerotic, suspicious for mets. Increased upper RP nodes, probably metastatic. Decreased intensely tracer avid right supradiaphragmatic and upper abdominal nodes, suspicious for metastasis. Redemonstrated intensely somatostatin avid bilobar hepatic lesions, consistent with metastases again morphologically difficult to delineate.   -- CT reviewed by [facility]: SINCE MAY 8 2024 INCREASED HEPATIC METASTASES, NEWLY SEEN PRIMARY TUMOR IN THE PANCREAS, and active bleeding within the duodenum with associated intraluminal hematoma.   -- chromogranin level is elevated at 2058, but [place] [facility] [place]   -- [place] with [place]. Sophia Prasad at [facility] [place], no plan for chemotherapy inpatient, if clinically improves/stabilizes then can be considered for treatment outpatient  --started on octreotide 250mcg TID    2. Duodenal bleed, Hemorrhagic shock  - Remains in MICU, s/p extubation 7/22. NG tube in place.  - CT w/ bleed in duodenum. GI called, EGD 7/9 -> S/p  IR embolization 7/9, intubated in MICU -> s/p extubation 7/15 -> intubated 7/18  - s/p multiple transfusions, fibrinogen low would recommend Cry for < 100, but coags have improved as are essentially normal now so unlikely, probably was related to liver dysfunction.   - s/p repeat EGD 7/12 showing duodenal lesions w/clots and oozing, no clear targets for intervention and no active bleeding, purastat applied to all areas of oozing.   - S/p EGD 7/18: duodenal ulcer with active oozing, ulcer with visible vessel. Bleeding treated.   - s/p multiple transfusions, per GI, high risk for rebleed, will continue PPI.   - Repeat CT AP w/ No evidence of GI bleed.  Interval endoscopic versus surgical intervention with metallic streak artifact suggestive of surgical clips in the region of the proximal one third portions of the duodenum. Evaluation of the previously seen hematoma is limited secondary to this artifact. Moderate bilateral pleural effusions and associated compressive atelectasis, right greater than left, overall slightly increased from previous CT. Anasarca.  - Per IR, In the absences of any active extravasation on CTA there's no place to target for an embolization  - GIB now stabilized, surgery continues to monitor. If significant recurrence of UGIB, surgery may consider emergent open duodenotomy   - now off IV abx  - started on octreotide 250 mcg TID    3. C. diff   - diarrhea now resolved   - cont. on isolation, and abx    4. Safety  - pt verbalizing suicidal ideations  - refusing psych       Christen Rosales NP  Hematology/ Oncology  [facility]  [phone] (office)  [phone] (alt office)  Evenings and weekends please call MD on call or office

## 2024-07-31 NOTE — PROGRESS NOTE ADULT - SUBJECTIVE AND OBJECTIVE BOX
Subjective: Patient NANDO HERNANDEZ seen and examined at bedside. Patient pulled NGT last night at ~22:00. This morning says he feels fine, willing to replace NGT "later" today. Still with pain in his extremities.      Objective:    ICU Vital Signs Last 24 Hrs  T(F): 98.4 (07-31-24 @ 04:00), Max: 99 (07-30-24 @ 20:00)  HR: 116 (07-31-24 @ 06:00) (85 - 142)  BP: 93/55 (07-31-24 @ 06:00) (81/42 - 118/58)  BP(mean): 71 (07-31-24 @ 06:00) (56 - 83)  ABP: --  RR: 25 (07-31-24 @ 06:00) (18 - 46)  SpO2: 97% (07-31-24 @ 06:00) (93% - 100%)      General: Elderly man lying in bed, awake and alert  [place]io: Irregularly irregular rhythm; normal S1/S2; no murmurs, rubs, or gallops  Neuro: Moves extremities, follows commands; Left toe movement decreased compared to right; sensation to light touch intact b/l LE  Pulm: Clear to auscultation bilaterally; intermittent rhonchorous sounds transmitted from upper airways; normal symmetric excursion; no wheezes, rales, or rhonchi  GI: Normoactive bowel sounds; nontender, nondistended  Ext: Feet cool to touch L>R; unable to appreciated peripheral pulses due to anasarca    07-30-24 @ 07:01  -  07-31-24 @ 07:00  --------------------------------------------------------  IN:    Enteral Tube Flush: 40 mL    IV PiggyBack: 400 mL    sodium chloride 0.45%: 150 mL  Total IN: 590 mL    OUT:    Indwelling Catheter - Urethral (mL): 600 mL    Vasopressin: 0 mL  Total OUT: 600 mL    Total NET: -10 mL        LABS:    07-31    145  |  108  |  28<H>  ----------------------------<  116<H>  3.5   |  23  |  0.85    Ca    7.8<L>      31 Jul 2024 00:25  Phos  2.5     07-31  Mg     1.8     07-31    TPro  4.3<L>  /  Alb  2.3<L>  /  TBili  0.9  /  DBili  x   /  AST  39  /  ALT  26  /  AlkPhos  179<H>  07-31  LIVER FUNCTIONS - ( 31 Jul 2024 00:25 )  Alb: 2.3 g/dL / Pro: 4.3 g/dL / ALK PHOS: 179 U/L / ALT: 26 U/L / AST: 39 U/L / GGT: x                               8.2    8.74  )-----------( 117      ( 31 Jul 2024 00:25 )             25.7   PT/INR - ( 31 Jul 2024 00:25 )   PT: 12.4 sec;   INR: 1.19 ratio         PTT - ( 31 Jul 2024 00:25 )  PTT:24.9 sec  Urinalysis Basic - ( 31 Jul 2024 00:25 )    Color: x / Appearance: x / SG: x / pH: x  Gluc: 116 mg/dL / Ketone: x  / Bili: x / Urobili: x   Blood: x / Protein: x / Nitrite: x   Leuk Esterase: x / RBC: x / WBC x   Sq Epi: x / Non Sq Epi: x / Bacteria: x    CAPILLARY BLOOD GLUCOSE      POCT Blood Glucose.: 124 mg/dL (31 Jul 2024 05:26)  POCT Blood Glucose.: 125 mg/dL (30 Jul 2024 18:31)  POCT Blood Glucose.: 126 mg/dL (30 Jul 2024 11:22)    MEDICATIONS  (STANDING):  atorvastatin 80 milliGRAM(s) Oral at bedtime  chlorhexidine 2% Cloths 1 Application(s) Topical <User Schedule>  chlorhexidine 4% Liquid 1 Application(s) Topical <User Schedule>  digoxin     Tablet 125 MICROGram(s) Oral daily  doxazosin 2 milliGRAM(s) Oral at bedtime  folic acid Injectable 1 milliGRAM(s) IV Push daily  hydrocortisone sodium succinate Injectable 50 milliGRAM(s) IV Push daily  insulin lispro (ADMELOG) corrective regimen sliding scale   SubCutaneous every 6 hours  midodrine 10 milliGRAM(s) Oral every 8 hours  multivitamin 1 Tablet(s) Oral daily  octreotide  Injectable 250 MICROGram(s) IV Push three times a day  pantoprazole  Injectable 40 milliGRAM(s) IV Push every 12 hours  thiamine Injectable 100 milliGRAM(s) IV Push daily    MEDICATIONS  (PRN):  sodium chloride 0.9% lock flush 10 milliLiter(s) IV Push every 1 hour PRN Pre/post blood products, medications, blood draw, and to maintain line patency

## 2024-07-31 NOTE — CHART NOTE - NSCHARTNOTEFT_GEN_A_CORE
MICU DOWN GRADE NOTE    Transfer from: MICU  Transfer to: (  ) Medicine    ( X ) Telemetry     (   ) RCU        (    ) Palliative         (   ) Stroke Unit        Accepting Physician:  Team (MAR) or ACP service:   Signout given to:       Patient is a 79y old  Male who presents with a chief complaint of hypotension (31 Jul 2024 10:31)      HPI:     INTERVAL HPI/OVERNIGHT EVENTS:      TO-DOs:  []  []  []  []  []    REVIEW OF SYSTEMS:  CONSTITUTIONAL: No fever, chills  HEENT:  No blurry vision No sinus or throat pain  NECK: No pain or stiffness  RESPIRATORY: No cough, wheezing, chills or hemoptysis; No shortness of breath  CARDIOVASCULAR: No chest pain, palpitations  GASTROINTESTINAL: No abdominal pain. No nausea, vomiting, or diarrhea  GENITOURINARY: No dysuria  NEUROLOGICAL: No HA, No focal weakness  SKIN: No itching, burning, rashes, or lesions   MUSCULOSKELETAL: No joint pain or swelling; No muscle, back, or extremity pain    MEDICATIONS:  atorvastatin 80 milliGRAM(s) Oral at bedtime  chlorhexidine 2% Cloths 1 Application(s) Topical <User Schedule>  chlorhexidine 4% Liquid 1 Application(s) Topical <User Schedule>  digoxin     Tablet 125 MICROGram(s) Oral daily  doxazosin 2 milliGRAM(s) Oral at bedtime  folic acid Injectable 1 milliGRAM(s) IV Push daily  insulin lispro (ADMELOG) corrective regimen sliding scale   SubCutaneous every 6 hours  lactated ringers. 1000 milliLiter(s) IV Continuous <Continuous>  midodrine 10 milliGRAM(s) Oral every 8 hours  multivitamin 1 Tablet(s) Oral daily  octreotide  Injectable 250 MICROGram(s) IV Push three times a day  pantoprazole  Injectable 40 milliGRAM(s) IV Push every 12 hours  sodium chloride 0.9% lock flush 10 milliLiter(s) IV Push every 1 hour PRN  thiamine Injectable 100 milliGRAM(s) IV Push daily      T(C): 36.9 (07-31-24 @ 16:00), Max: 37.2 (07-30-24 @ 20:00)  HR: 115 (07-31-24 @ 16:00) (94 - 142)  BP: 102/60 (07-31-24 @ 16:00) (81/42 - 127/56)  RR: 21 (07-31-24 @ 16:00) (16 - 46)  SpO2: 99% (07-31-24 @ 16:00) (93% - 100%)  Wt(kg): --Vital Signs Last 24 Hrs  T(C): 36.9 (31 Jul 2024 16:00), Max: 37.2 (30 Jul 2024 20:00)  T(F): 98.4 (31 Jul 2024 16:00), Max: 99 (30 Jul 2024 20:00)  HR: 115 (31 Jul 2024 16:00) (94 - 142)  BP: 102/60 (31 Jul 2024 16:00) (81/42 - 127/56)  BP(mean): 76 (31 Jul 2024 16:00) (56 - 86)  RR: 21 (31 Jul 2024 16:00) (16 - 46)  SpO2: 99% (31 Jul 2024 16:00) (93% - 100%)    Parameters below as of 31 Jul 2024 08:00  Patient On (Oxygen Delivery Method): room air        PHYSICAL EXAM:  GENERAL: NAD, well-groomed, well-developed  HEENT: AT/NC, EOMI, PERRLA, clear sclera and conjunctiva, MMM  NECK: Supple, No JVD  CHEST/LUNG: Clear to auscultation bilaterally; No rales, rhonchi, or wheezing  HEART: Regular rate and rhythm; No murmurs, rubs, or gallops  ABDOMEN: Soft, Nontender, Nondistended; Bowel sounds present  EXTREMITIES: No LE edema, no calf tenderness  SKIN: No rashes or lesions  NEURO: AAOx3, No focal deficits    Consultant(s) Notes Reviewed:  [x ] YES  [ ] NO  Care Discussed with Consultants/Other Providers [ x] YES  [ ] NO    LABS:                        9.5    8.36  )-----------( 135      ( 31 Jul 2024 12:34 )             30.8     07-31    147<H>  |  108  |  28<H>  ----------------------------<  140<H>  4.4   |  25  |  0.91    Ca    7.9<L>      31 Jul 2024 12:34  Phos  3.0     07-31  Mg     1.8     07-31    TPro  5.1<L>  /  Alb  2.8<L>  /  TBili  0.9  /  DBili  x   /  AST  42<H>  /  ALT  28  /  AlkPhos  185<H>  07-31    PT/INR - ( 31 Jul 2024 00:25 )   PT: 12.4 sec;   INR: 1.19 ratio         PTT - ( 31 Jul 2024 00:25 )  PTT:24.9 sec  Urinalysis Basic - ( 31 Jul 2024 12:34 )    Color: x / Appearance: x / SG: x / pH: x  Gluc: 140 mg/dL / Ketone: x  / Bili: x / Urobili: x   Blood: x / Protein: x / Nitrite: x   Leuk Esterase: x / RBC: x / WBC x   Sq Epi: x / Non Sq Epi: x / Bacteria: x      CAPILLARY BLOOD GLUCOSE      POCT Blood Glucose.: 131 mg/dL (31 Jul 2024 12:15)  POCT Blood Glucose.: 124 mg/dL (31 Jul 2024 05:26)  POCT Blood Glucose.: 125 mg/dL (30 Jul 2024 18:31)      ABG - ( 31 Jul 2024 15:55 )  pH, Arterial: 7.46  pH, Blood: x     /  pCO2: 34    /  pO2: 83    / HCO3: 24    / Base Excess: 0.5   /  SaO2: 99.2              Urinalysis Basic - ( 31 Jul 2024 12:34 )    Color: x / Appearance: x / SG: x / pH: x  Gluc: 140 mg/dL / Ketone: x  / Bili: x / Urobili: x   Blood: x / Protein: x / Nitrite: x   Leuk Esterase: x / RBC: x / WBC x   Sq Epi: x / Non Sq Epi: x / Bacteria: x        RADIOLOGY & ADDITIONAL TESTS:    Imaging Personally Reviewed:  [x ] YES  [ ] NO MICU DOWN GRADE NOTE    Transfer from: MICU  Transfer to: (  ) Medicine    ( X ) Telemetry     (   ) RCU        (    ) Palliative         (   ) Stroke Unit        Accepting Physician:  Team (MAR) or ACP service:   Signout given to:       Patient is a 79y old  Male who presents with a chief complaint of hypotension (31 Jul 2024 10:31)      HPI:       INTERVAL HPI/OVERNIGHT EVENTS:      TO-DOs:  []  []  []  []  []    REVIEW OF SYSTEMS:  CONSTITUTIONAL: No fever, chills  HEENT:  No blurry vision No sinus or throat pain  NECK: No pain or stiffness  RESPIRATORY: Cough, no wheezing, chills or hemoptysis; No shortness of breath  CARDIOVASCULAR: No chest pain, palpitations  GASTROINTESTINAL: No abdominal pain. No nausea, vomiting, or diarrhea  GENITOURINARY: No dysuria  NEUROLOGICAL: No HA, No focal weakness  SKIN: No itching, burning, rashes, or lesions   MUSCULOSKELETAL: B/l LE swelling and pain    MEDICATIONS:  atorvastatin 80 milliGRAM(s) Oral at bedtime  chlorhexidine 2% Cloths 1 Application(s) Topical <User Schedule>  chlorhexidine 4% Liquid 1 Application(s) Topical <User Schedule>  digoxin     Tablet 125 MICROGram(s) Oral daily  doxazosin 2 milliGRAM(s) Oral at bedtime  folic acid Injectable 1 milliGRAM(s) IV Push daily  insulin lispro (ADMELOG) corrective regimen sliding scale   SubCutaneous every 6 hours  lactated ringers. 1000 milliLiter(s) IV Continuous <Continuous>  midodrine 10 milliGRAM(s) Oral every 8 hours  multivitamin 1 Tablet(s) Oral daily  octreotide  Injectable 250 MICROGram(s) IV Push three times a day  pantoprazole  Injectable 40 milliGRAM(s) IV Push every 12 hours  sodium chloride 0.9% lock flush 10 milliLiter(s) IV Push every 1 hour PRN  thiamine Injectable 100 milliGRAM(s) IV Push daily      T(C): 36.9 (07-31-24 @ 16:00), Max: 37.2 (07-30-24 @ 20:00)  HR: 115 (07-31-24 @ 16:00) (94 - 142)  BP: 102/60 (07-31-24 @ 16:00) (81/42 - 127/56)  RR: 21 (07-31-24 @ 16:00) (16 - 46)  SpO2: 99% (07-31-24 @ 16:00) (93% - 100%)  Wt(kg): --Vital Signs Last 24 Hrs  T(C): 36.9 (31 Jul 2024 16:00), Max: 37.2 (30 Jul 2024 20:00)  T(F): 98.4 (31 Jul 2024 16:00), Max: 99 (30 Jul 2024 20:00)  HR: 115 (31 Jul 2024 16:00) (94 - 142)  BP: 102/60 (31 Jul 2024 16:00) (81/42 - 127/56)  BP(mean): 76 (31 Jul 2024 16:00) (56 - 86)  RR: 21 (31 Jul 2024 16:00) (16 - 46)  SpO2: 99% (31 Jul 2024 16:00) (93% - 100%)    Parameters below as of 31 Jul 2024 08:00  Patient On (Oxygen Delivery Method): room air        PHYSICAL EXAM:  General: Elderly man lying in bed, awake and alert  Cadio: Irregularly irregular rhythm; normal S1/S2; no murmurs, rubs, or gallops  Neuro: Moves extremities, follows commands; Left toe movement decreased compared to right; sensation to light touch intact b/l LE  Pulm: Clear to auscultation bilaterally; intermittent rhonchorous sounds transmitted from upper airways; normal symmetric excursion; no wheezes, rales, or rhonchi  GI: Normoactive bowel sounds; nontender, nondistended  Ext: Feet cool to touch L>R; unable to appreciated peripheral pulses due to anasarca    Consultant(s) Notes Reviewed:  [x ] YES  [ ] NO  Care Discussed with Consultants/Other Providers [ x] YES  [ ] NO    LABS:                        9.5    8.36  )-----------( 135      ( 31 Jul 2024 12:34 )             30.8     07-31    147<H>  |  108  |  28<H>  ----------------------------<  140<H>  4.4   |  25  |  0.91    Ca    7.9<L>      31 Jul 2024 12:34  Phos  3.0     07-31  Mg     1.8     07-31    TPro  5.1<L>  /  Alb  2.8<L>  /  TBili  0.9  /  DBili  x   /  AST  42<H>  /  ALT  28  /  AlkPhos  185<H>  07-31    PT/INR - ( 31 Jul 2024 00:25 )   PT: 12.4 sec;   INR: 1.19 ratio         PTT - ( 31 Jul 2024 00:25 )  PTT:24.9 sec  Urinalysis Basic - ( 31 Jul 2024 12:34 )    Color: x / Appearance: x / SG: x / pH: x  Gluc: 140 mg/dL / Ketone: x  / Bili: x / Urobili: x   Blood: x / Protein: x / Nitrite: x   Leuk Esterase: x / RBC: x / WBC x   Sq Epi: x / Non Sq Epi: x / Bacteria: x      CAPILLARY BLOOD GLUCOSE      POCT Blood Glucose.: 131 mg/dL (31 Jul 2024 12:15)  POCT Blood Glucose.: 124 mg/dL (31 Jul 2024 05:26)  POCT Blood Glucose.: 125 mg/dL (30 Jul 2024 18:31)      ABG - ( 31 Jul 2024 15:55 )  pH, Arterial: 7.46  pH, Blood: x     /  pCO2: 34    /  pO2: 83    / HCO3: 24    / Base Excess: 0.5   /  SaO2: 99.2              Urinalysis Basic - ( 31 Jul 2024 12:34 )    Color: x / Appearance: x / SG: x / pH: x  Gluc: 140 mg/dL / Ketone: x  / Bili: x / Urobili: x   Blood: x / Protein: x / Nitrite: x   Leuk Esterase: x / RBC: x / WBC x   Sq Epi: x / Non Sq Epi: x / Bacteria: x        RADIOLOGY & ADDITIONAL TESTS:    Imaging Personally Reviewed:  [x ] YES  [ ] NO    ASSESSMENT:  79 yo male with metastatic neuroendocrine pancreatic cancer (tx on hold) and PMH of CAD s/p PCI x3 with most recent stent 6/12/24 on Plavix and eliquis , chronic Afib on eliquis, orthostatic hypotension on midodrine, PAD , recent admission for dx cath on 6/24/24 presented from PCP's office for hypotension despite taking AM midodrine dose on 7/2, admitted to medicine for symptomatic hypotension and RAZIA. Per chart, on 7/8 PM, pt was noted to have acute onset of melena x5 and coffee ground emesis x2-3. RRT was called on 7/9 AM for hypotension, hgb dropped from 9.2 to 7.4 (admission baseline 10-11), FOBT +, pt was started on PPI IV and transfused 1 unit of pRBC. GI was consulted and underwent EGD on 7/9 afternoon. MICU was consulted for uncontrolled bleeding during EGD. During EGD, pt became hypotensive and was given phenylephrine, had A-fib with RVR s/p amiodarone. Now s/p IR GDA embolization, admitted to MICU for further monitoring i.s.o hemorrhagic shock 2/2 GI bleed. On repeat EGD, oozing but no active bleeding was noted. Hospital course c/b c.diff, and patient has been placed on vancomycin. Patient had melanotic stool 7/18 with Hgb drop 10.1 to 7.1 and received 2 units pRBC and 1 unit platelets. Patient was reintubated and underwent EGD 7/18 afternoon. GI found duodenal bleed. Patient now s/p 3x clips and epi. Since 7/18 patient has continued to have melanotic stools with hemoglobin drops concerning for slow rebleeding. Currently, discussion revolves around the risks and benefits of antiplatelet therapy for recent stent vs risk of GI rebleed, and potential to undergo surgery.    - Total transfusions: 17 pRBC, 4 platelet, 5 FFP (as of 7/28)    Plan:   NEUROLOGIC  # Baseline AOx3 (waxing/waning)  Course:  - intubated 7/9, extubated 7/15  - reintubated 7/18 for EGD, extubated 7/22  -reintubated 7/23, extubated  - not sedated  - Currently AOx2-3  Plan:  - Delirium precautions    CARDIOVASCULAR  # hemorrhagic shock (worsened)  Course:  - Per spouse, pt's baseline BP is a little bit over 100  - 7/9: RRT called 7/9 for hypotension; 2/2 to Upper GI bleeds, urgently took to EGD, found bleeding ulcer that was not well controlled, underwent IR GDA embolization  - 7/18, patient had large melanotic stool with Hgb drop from 10.1-7.1, patient was restarted on henny, vaso, and levo for reintubation for GI scope; s/p 2pRBC, 1 platelet  - Total transfusions: 16 pRBC, 4 platelet, 5 FFP  Findings:  - AM cortisol 17 (7/5), AM cortisol 37.1 (7/11)  - POCUS ECHO (7/16) showed no cardiogenic shock, no tamponade, low SV indeterminate IVC, irregular B lines but not concerning for pulmonary edema, and some subdiaphragmatic fluid  Plan:  - Continue to monitor CBC, transfuse for Hgb <7  - Continue to hold Plavix 75 mg  - Received midodrine 10 once on 7/30 prior to pulling NGT    #CAD s/p PCI x3, most recent stent 6/12/24, NURIA to pLAD (stable)  Course:  - 7/13 restarted plavix for new stent (6/12/24) per GI, 7/18 held plavix due to bleeding  - 7/22 started low-dose aspirin as safer alternative to plavix per cardiology, but d/c 7/23 as patient bled again  Plan:  - Continue to hold Plavix 75 mg and aspirin 81 mg    #PAD  # A-fib on eliquis (worsening)  May have been worsened because of GI bleed  - s/p successful DCCV 10/31   - 7/16 In RVR, 2 doses of amio overnight; Cards recommended resuming home dose sotalol 40 mg PO (qTC wnl), s/p 1 dose sotalol but RVR persisted  - 7/16 Started amio loading  - 7/22 Added digoxin load for persistent HR 130s over past week  - 7/23 Dig level 1.3, patient's HR back in 60s in the afternoon  - 7/28 Patient pulled NG tube, unable to get several doses of amio, back in afib with RVR  - 7/30 NGT replaced - was pulled out later in the PM  Plan:  - Hold eliquis   - Hold home sotalol  - Cardiology following, advises against chemical cardioversion off AC if possible  - digoxin lvl 1.3  - Start digoxin 62.5 micrograms IV push daily   - Repeat digoxin level on 8/3    #NSVT (stable)  Likely 2/2 cardiac structural damage given prior stents as well as electrolyte abnormalities while being diuresed. Troponins elevated but stable today. Troponins also elevated earlier in admission. EKG without ST elevations.  Plan:  - Continue to monitor  - Replete electrolytes as necessary    PULMONARY  #Intubated for airway protection prior to EGD/IR embolization (resolved)  Course:  - Extubated 7/22, SpO2 100% on RA, reintubated 7/27, Extubated 7/28    RENAL/  #RAZIA (resolved)  - Cr peaked at 3.38 (7/11)  - Currently Cr 1.13 ( 7/24)  Plan:  - Continue to monitor Cr    #ATN i.s.o hypotension  #metabolic acidosis (improving)  Patient was in a state of metabolic acidosis around 7/16, but recent ABG pH in normal range. However, patient has been bicarb deficient throughout the admission.   Plan:  - f/u nephro recs  - hold sodium bicarb 650mg tid considering improvement in hypernatremia and bicarb lvls  - Per nephro, patient is not a dialysis candidate  - trend BUN/Cr   - monitor Is and Os     #Hypernatremia (improving)  Patient having a gradually increasing sodium since transfer to the MICU. Na was improving by adding free water, lowering the bicarb drip, and diuresis with bumex/metolazone. Now downtrending, will hold diuresis.  Plan:  - stopped free water 300mL q6  - hold sodium bicarb 650mg TID given improvement  - hold bumex 1mg q12 IV, metolazone 5mg PO QD    #Urinary retention (improving)  Plan:  - Started Doxazosin 2 mg daily (7/15)  - Monitor I/Os  - Tavarez placed by urology 7/19, making urine  - Removed tavarez 7/30    GASTROINTESTINAL  #Upper GI Bleed (stable)  Course:  - 7/9 CT A/P - Active bleeding in the third portion of the duodenum. Progression of liver metastases.  - 7/9 EGD showed esophagitis, One non-bleeding duodenal ulcer with a clean ulcer base (Arjun Class III). One oozing duodenal ulcer with spurting hemorrhage (Arjun Class Ia). Treatment not successful. Treated with bipolar cautery.  - 7/9 s/p IR GDA embolization  - 7/18 Patient had melanotic stool with hemoglobin drop 10.1 to 7.1; EGD showing duodenal bleed s/p 3 clips + epi  - CTA (7/20) showed no active bleed  - H. pylori negative  - Total EGDs: 3  Plan:  - Continue pantoprazole 40 mg IV q12  - monitor Hgb, transfuse as needed for Hgb <7  - continue to f/u with GI and IR     - Per GI, "Will need PPI BID for 8 weeks for grade D esophagitis and PUD"    - Per GI, high risk of 30 day rebleed (>10-20% risk)  - No acute IR intervention per IR because no active area of extravasation on imaging (7/21)  - No acute GI intervention per GI (7/21)  - Surgery evaluated patient 7/23, may be a candidate for duodenotomy to oversew ulcers, but patient cannot be on aspirin  - Reached out to GI (7/26) about longer-term goals including risk of not being on aspirin vs bleeding;  ·Per GI, still a risks vs benefits situation as he may bleed again with ASA, patient may require a surgical intervention next  - As per heme/onc, c/w octreotide 250mg IV TID    #Nutrition  Course:  - FEES (7/17) failed  - ENT consulted for vallecular lesion, diagnosed vallecular cyst, no inpatient intervention required, outpatient f/u  - Patient currently extubated (7/24)  - FEES failed 7/29; patient refused evaluation  - NGT replaced (7/30)- was given 1mg ativan, nasal lidocaine gel, affrin prior to NGT insertion  - Pt Pulled NGT 7/30 at 22:00, third time, no plan to replace    Plan:  - No plan to replace, update wife, discuss GOC    #Diarrhea (resolved)  Course:  - c. diff + (7/14)  - vancomycin (7/14 -7/23) active infection dose, vancomycin prophylaxis (7/24-7/25)   Plan:  - No longer on vancomycin  - Infection prevention says contact precautions continued due to mucoid BMs    #Transaminitis (worsened)  Findings:  - Bili 1.1 (7/16) -> 1.3 (7/17) ->0.5 (7/24) -> 0.6 (7/28)  - Alk P 359 (7/16)-> 484 (7/17) -> 174 (7/24) -> 229 (7/28)  -  (7/16)->129 (7/17) -> 75 (7/24) -> 65 (7/28)  - ALT 67 (7/16)-> 72 (7/17) -> 30 (7/24) -> 229 (7/28)  - RUQ US (7/17): known liver mets, no acute findings  Plan:  - Continue atorvastatin 80 mg daily    INFECTIOUS DISEASE  #leukocytosis (resolved)  Cultures:  - Blood cultures negative throughout admission  - Sputum Cx from ETT 7/13 showed numerous gram neg krishna (Klebsiella oxytoca/raoultella ornithinolytica)  - GI PCR negative  - MRSA swab negative  - 7/29 sputum culture growing P aeruginosa   Antibiotics Course:  - Vancomycin (7/14 - 7/23), Vancomycin Prophylaxis (7/24 -7/25)  - Zosyn (7/11-7/18), resistance shown, switched to meropenem  - Meropenem (7/18-7/25)  Plan:  - No longer on antibiotics  - Continue to monitor for fevers  - If febrile start abx for pseudomonas    ENDOCRINE  #adrenal insufficiency (stable)  Findings:  - 7/5 cortisol 17   - 7/11 cortisol 37.1   Plan:  - Taper hydrocortisone 25mg QD for 2 days  - Monitor sugars with steroid    HEMATOLOGY/ONCOLOGY   # neuroendocrine tumor (unchanged)  - was on lanreotide then had POD in liver and started on peptide receptor radiotherapy (PRRT)- typically given every 8 weeks for 4 doses. He last received it 10/20/2023   - PET 5/28/24 shows new somatostatin avid osseous lesions; decreased intensely avid right supradiaphragmatic and upper abdominal nodes, suspicious for mets  - Gastrin level 5509  Plan:  - per heme/onc:     - can check factor levels V, VIII, X     - "f/u with Dr. Sophia Prasad at [facility] [place] after [place], no plan for treatment inpatient, if clinically improves/stabilizes then can be considered for treatment outpatient"     - c/w octreotide 250 mcg TID    #Normocytic anemia (stable)  - Pt with low Hgb 7-9  - ISO of GI bleed hx and continued melanotic BMs, there is c/o of rebleed; per GI, high risk of rebleeding, currently seems to be slow bleeding   Plan:  - Surgery and GI made aware & are following   - Continue CBC q12 unless having large melanotic stools    #DVT ppx (unchanged)  - SCDs  - LE duplex (7/17): negative for DVT    SKINS:   - Lines/Tubes - PIV, cordis replaced with central line (7/14-7/23), A line (7/20-7/29), Subclavian central line (7/24- )  - Removed A-line (R axillary) 7/29  - Removed tavarez 7/30, monitor urine output  - 2x peripheral IV right arm    Ethics:   - Full Code   - GOC 7/12, spoke to spouse (Yamilet) over phone with palliative care team, discussed GOC again on 7/16  - 7/29 - Patient expressed that he wants to die, refuses to see psych, equivocating on DNR status.   - Pulled NGT for 3rd time on 7/30  - Continue GOC discussion.    Consults this admission: GI, Heme Onc, Palliative, Endocrinology, Cardiology, Nephrology, Interventional Radiology, General Surgery MICU DOWN GRADE NOTE    Transfer from: MICU  Transfer to: (  ) Medicine    ( X ) Telemetry     (   ) RCU        (    ) Palliative         (   ) Stroke Unit        Accepting Physician:  Team (MAR) or ACP service:   Signout given to:       Patient is a 79y old  Male who presents with a chief complaint of hypotension (31 Jul 2024 10:31)      HPI:   77 yo male with metastatic neuroendocrine pancreatic cancer (tx on hold) and PMH of CAD s/p PCI x3 with most recent stent 6/12/24 on Plavix and Eliquis, chronic Afib on Eliquis, orthostatic hypotension on midodrine, PAD, recent admission for dx cath on 6/24/24 presented from PCP's office for hypotension despite taking AM midodrine dose on 7/2, admitted to medicine for symptomatic hypotension and RAZIA. Per chart, on 7/8 PM, pt was noted to have acute onset of melena x5 and coffee ground emesis x2-3. RRT was called on 7/9 AM for hypotension, hgb dropped from 9.2 to 7.4 (admission baseline 10-11), FOBT +, pt was started on PPI IV and transfused 1 unit of pRBC. GI was consulted and underwent EGD on 7/9 afternoon. MICU was consulted for uncontrolled bleeding during EGD. During EGD, pt became hypotensive and was given phenylephrine, had A-fib with RVR s/p amiodarone. Now s/p IR GDA embolization, admitted to MICU for further monitoring i.s.o hemorrhagic shock 2/2 GI bleed. On repeat EGD, oozing but no active bleeding was noted. Hospital course c/b c.diff, and patient was started on vancomycin. Patient had melanotic stool 7/18 with Hgb drop 10.1 to 7.1 and received 2 units pRBC and 1 unit platelets. Patient was reintubated and underwent EGD 7/18 afternoon. GI found duodenal bleed. Patient now s/p 3x clips and epi. Since 7/18 patient has continued to have melanotic stools with hemoglobin drops concerning for slow rebleeding. Currently, discussion revolves around the risks and benefits of antiplatelet therapy for recent stent vs risk of GI rebleed, and potential to undergo surgery.    - Total transfusions: 17 pRBC, 4 platelet, 5 FFP (as of 7/28)      INTERVAL HPI/OVERNIGHT EVENTS:      TO-DOs:  []  []  []  []  []    REVIEW OF SYSTEMS:  CONSTITUTIONAL: No fever, chills  HEENT:  No blurry vision, No sinus or throat pain  NECK: No pain or stiffness  RESPIRATORY: Cough, no wheezing, chills or hemoptysis; No shortness of breath  CARDIOVASCULAR: No chest pain, palpitations  GASTROINTESTINAL: No abdominal pain. No nausea, vomiting, or diarrhea  GENITOURINARY: No dysuria  NEUROLOGICAL: No HA, No focal weakness  SKIN: No itching, burning, rashes, or lesions   MUSCULOSKELETAL: B/l LE swelling and pain    MEDICATIONS:  atorvastatin 80 milliGRAM(s) Oral at bedtime  chlorhexidine 2% Cloths 1 Application(s) Topical <User Schedule>  chlorhexidine 4% Liquid 1 Application(s) Topical <User Schedule>  digoxin     Tablet 125 MICROGram(s) Oral daily  doxazosin 2 milliGRAM(s) Oral at bedtime  folic acid Injectable 1 milliGRAM(s) IV Push daily  insulin lispro (ADMELOG) corrective regimen sliding scale   SubCutaneous every 6 hours  lactated ringers. 1000 milliLiter(s) IV Continuous <Continuous>  midodrine 10 milliGRAM(s) Oral every 8 hours  multivitamin 1 Tablet(s) Oral daily  octreotide  Injectable 250 MICROGram(s) IV Push three times a day  pantoprazole  Injectable 40 milliGRAM(s) IV Push every 12 hours  sodium chloride 0.9% lock flush 10 milliLiter(s) IV Push every 1 hour PRN  thiamine Injectable 100 milliGRAM(s) IV Push daily      T(C): 36.9 (07-31-24 @ 16:00), Max: 37.2 (07-30-24 @ 20:00)  HR: 115 (07-31-24 @ 16:00) (94 - 142)  BP: 102/60 (07-31-24 @ 16:00) (81/42 - 127/56)  RR: 21 (07-31-24 @ 16:00) (16 - 46)  SpO2: 99% (07-31-24 @ 16:00) (93% - 100%)  Wt(kg): --Vital Signs Last 24 Hrs  T(C): 36.9 (31 Jul 2024 16:00), Max: 37.2 (30 Jul 2024 20:00)  T(F): 98.4 (31 Jul 2024 16:00), Max: 99 (30 Jul 2024 20:00)  HR: 115 (31 Jul 2024 16:00) (94 - 142)  BP: 102/60 (31 Jul 2024 16:00) (81/42 - 127/56)  BP(mean): 76 (31 Jul 2024 16:00) (56 - 86)  RR: 21 (31 Jul 2024 16:00) (16 - 46)  SpO2: 99% (31 Jul 2024 16:00) (93% - 100%)    Parameters below as of 31 Jul 2024 08:00  Patient On (Oxygen Delivery Method): room air        PHYSICAL EXAM:  General: Elderly man lying in bed, awake and alert  Cadio: Irregularly irregular rhythm; normal S1/S2; no murmurs, rubs, or gallops  Neuro: Moves extremities, follows commands; Left toe movement decreased compared to right; sensation to light touch intact b/l LE  Pulm: Clear to auscultation bilaterally; intermittent rhonchorous sounds transmitted from upper airways; normal symmetric excursion; no wheezes, rales, or rhonchi  GI: Normoactive bowel sounds; nontender, nondistended  Ext: Feet cool to touch L>R; unable to appreciated peripheral pulses due to anasarca    Consultant(s) Notes Reviewed:  [x ] YES  [ ] NO  Care Discussed with Consultants/Other Providers [ x] YES  [ ] NO    LABS:                        9.5    8.36  )-----------( 135      ( 31 Jul 2024 12:34 )             30.8     07-31    147<H>  |  108  |  28<H>  ----------------------------<  140<H>  4.4   |  25  |  0.91    Ca    7.9<L>      31 Jul 2024 12:34  Phos  3.0     07-31  Mg     1.8     07-31    TPro  5.1<L>  /  Alb  2.8<L>  /  TBili  0.9  /  DBili  x   /  AST  42<H>  /  ALT  28  /  AlkPhos  185<H>  07-31    PT/INR - ( 31 Jul 2024 00:25 )   PT: 12.4 sec;   INR: 1.19 ratio         PTT - ( 31 Jul 2024 00:25 )  PTT:24.9 sec  Urinalysis Basic - ( 31 Jul 2024 12:34 )    Color: x / Appearance: x / SG: x / pH: x  Gluc: 140 mg/dL / Ketone: x  / Bili: x / Urobili: x   Blood: x / Protein: x / Nitrite: x   Leuk Esterase: x / RBC: x / WBC x   Sq Epi: x / Non Sq Epi: x / Bacteria: x      CAPILLARY BLOOD GLUCOSE      POCT Blood Glucose.: 131 mg/dL (31 Jul 2024 12:15)  POCT Blood Glucose.: 124 mg/dL (31 Jul 2024 05:26)  POCT Blood Glucose.: 125 mg/dL (30 Jul 2024 18:31)      ABG - ( 31 Jul 2024 15:55 )  pH, Arterial: 7.46  pH, Blood: x     /  pCO2: 34    /  pO2: 83    / HCO3: 24    / Base Excess: 0.5   /  SaO2: 99.2              Urinalysis Basic - ( 31 Jul 2024 12:34 )    Color: x / Appearance: x / SG: x / pH: x  Gluc: 140 mg/dL / Ketone: x  / Bili: x / Urobili: x   Blood: x / Protein: x / Nitrite: x   Leuk Esterase: x / RBC: x / WBC x   Sq Epi: x / Non Sq Epi: x / Bacteria: x        RADIOLOGY & ADDITIONAL TESTS:    Imaging Personally Reviewed:  [x ] YES  [ ] NO    ASSESSMENT:  77 yo male with metastatic neuroendocrine pancreatic cancer (tx on hold) and PMH of CAD s/p PCI x3 with most recent stent 6/12/24 on Plavix and eliquis , chronic Afib on eliquis, orthostatic hypotension on midodrine, PAD , recent admission for dx cath on 6/24/24 presented from PCP's office for hypotension despite taking AM midodrine dose on 7/2, admitted to medicine for symptomatic hypotension and RAZIA. Per chart, on 7/8 PM, pt was noted to have acute onset of melena x5 and coffee ground emesis x2-3. RRT was called on 7/9 AM for hypotension, hgb dropped from 9.2 to 7.4 (admission baseline 10-11), FOBT +, pt was started on PPI IV and transfused 1 unit of pRBC. GI was consulted and underwent EGD on 7/9 afternoon. MICU was consulted for uncontrolled bleeding during EGD. During EGD, pt became hypotensive and was given phenylephrine, had A-fib with RVR s/p amiodarone. Now s/p IR GDA embolization, admitted to MICU for further monitoring i.s.o hemorrhagic shock 2/2 GI bleed. On repeat EGD, oozing but no active bleeding was noted. Hospital course c/b c.diff, and patient has been placed on vancomycin. Patient had melanotic stool 7/18 with Hgb drop 10.1 to 7.1 and received 2 units pRBC and 1 unit platelets. Patient was reintubated and underwent EGD 7/18 afternoon. GI found duodenal bleed. Patient now s/p 3x clips and epi. Since 7/18 patient has continued to have melanotic stools with hemoglobin drops concerning for slow rebleeding. Currently, discussion revolves around the risks and benefits of antiplatelet therapy for recent stent vs risk of GI rebleed, and potential to undergo surgery.    - Total transfusions: 17 pRBC, 4 platelet, 5 FFP (as of 7/28)    Plan:   NEUROLOGIC  # Baseline AOx3 (waxing/waning)  Course:  - intubated 7/9, extubated 7/15  - reintubated 7/18 for EGD, extubated 7/22  -reintubated 7/23, extubated  - not sedated  - Currently AOx2-3  Plan:  - Delirium precautions    CARDIOVASCULAR  # hemorrhagic shock (worsened)  Course:  - Per spouse, pt's baseline BP is a little bit over 100  - 7/9: RRT called 7/9 for hypotension; 2/2 to Upper GI bleeds, urgently took to EGD, found bleeding ulcer that was not well controlled, underwent IR GDA embolization  - 7/18, patient had large melanotic stool with Hgb drop from 10.1-7.1, patient was restarted on henny, vaso, and levo for reintubation for GI scope; s/p 2pRBC, 1 platelet  - Total transfusions: 16 pRBC, 4 platelet, 5 FFP  Findings:  - AM cortisol 17 (7/5), AM cortisol 37.1 (7/11)  - POCUS ECHO (7/16) showed no cardiogenic shock, no tamponade, low SV indeterminate IVC, irregular B lines but not concerning for pulmonary edema, and some subdiaphragmatic fluid  Plan:  - Continue to monitor CBC, transfuse for Hgb <7  - Continue to hold Plavix 75 mg  - Received midodrine 10 once on 7/30 prior to pulling NGT    #CAD s/p PCI x3, most recent stent 6/12/24, NURIA to pLAD (stable)  Course:  - 7/13 restarted plavix for new stent (6/12/24) per GI, 7/18 held plavix due to bleeding  - 7/22 started low-dose aspirin as safer alternative to plavix per cardiology, but d/c 7/23 as patient bled again  Plan:  - Continue to hold Plavix 75 mg and aspirin 81 mg    #PAD  # A-fib on eliquis (worsening)  May have been worsened because of GI bleed  - s/p successful DCCV 10/31   - 7/16 In RVR, 2 doses of amio overnight; Cards recommended resuming home dose sotalol 40 mg PO (qTC wnl), s/p 1 dose sotalol but RVR persisted  - 7/16 Started amio loading  - 7/22 Added digoxin load for persistent HR 130s over past week  - 7/23 Dig level 1.3, patient's HR back in 60s in the afternoon  - 7/28 Patient pulled NG tube, unable to get several doses of amio, back in afib with RVR  - 7/30 NGT replaced - was pulled out later in the PM  Plan:  - Hold eliquis   - Hold home sotalol  - Cardiology following, advises against chemical cardioversion off AC if possible  - digoxin lvl 1.3  - Start digoxin 62.5 micrograms IV push daily   - Repeat digoxin level on 8/3    #NSVT (stable)  Likely 2/2 cardiac structural damage given prior stents as well as electrolyte abnormalities while being diuresed. Troponins elevated but stable today. Troponins also elevated earlier in admission. EKG without ST elevations.  Plan:  - Continue to monitor  - Replete electrolytes as necessary    PULMONARY  #Intubated for airway protection prior to EGD/IR embolization (resolved)  Course:  - Extubated 7/22, SpO2 100% on RA, reintubated 7/27, Extubated 7/28    RENAL/  #RAZIA (resolved)  - Cr peaked at 3.38 (7/11)  - Currently Cr 1.13 ( 7/24)  Plan:  - Continue to monitor Cr    #ATN i.s.o hypotension  #metabolic acidosis (improving)  Patient was in a state of metabolic acidosis around 7/16, but recent ABG pH in normal range. However, patient has been bicarb deficient throughout the admission.   Plan:  - f/u nephro recs  - hold sodium bicarb 650mg tid considering improvement in hypernatremia and bicarb lvls  - Per nephro, patient is not a dialysis candidate  - trend BUN/Cr   - monitor Is and Os     #Hypernatremia (improving)  Patient having a gradually increasing sodium since transfer to the MICU. Na was improving by adding free water, lowering the bicarb drip, and diuresis with bumex/metolazone. Now downtrending, will hold diuresis.  Plan:  - stopped free water 300mL q6  - hold sodium bicarb 650mg TID given improvement  - hold bumex 1mg q12 IV, metolazone 5mg PO QD    #Urinary retention (improving)  Plan:  - Started Doxazosin 2 mg daily (7/15)  - Monitor I/Os  - Tavarez placed by urology 7/19, making urine  - Removed tavarez 7/30    GASTROINTESTINAL  #Upper GI Bleed (stable)  Course:  - 7/9 CT A/P - Active bleeding in the third portion of the duodenum. Progression of liver metastases.  - 7/9 EGD showed esophagitis, One non-bleeding duodenal ulcer with a clean ulcer base (Arjun Class III). One oozing duodenal ulcer with spurting hemorrhage (Arjun Class Ia). Treatment not successful. Treated with bipolar cautery.  - 7/9 s/p IR GDA embolization  - 7/18 Patient had melanotic stool with hemoglobin drop 10.1 to 7.1; EGD showing duodenal bleed s/p 3 clips + epi  - CTA (7/20) showed no active bleed  - H. pylori negative  - Total EGDs: 3  Plan:  - Continue pantoprazole 40 mg IV q12  - monitor Hgb, transfuse as needed for Hgb <7  - continue to f/u with GI and IR     - Per GI, "Will need PPI BID for 8 weeks for grade D esophagitis and PUD"    - Per GI, high risk of 30 day rebleed (>10-20% risk)  - No acute IR intervention per IR because no active area of extravasation on imaging (7/21)  - No acute GI intervention per GI (7/21)  - Surgery evaluated patient 7/23, may be a candidate for duodenotomy to oversew ulcers, but patient cannot be on aspirin  - Reached out to GI (7/26) about longer-term goals including risk of not being on aspirin vs bleeding;  ·Per GI, still a risks vs benefits situation as he may bleed again with ASA, patient may require a surgical intervention next  - As per heme/onc, c/w octreotide 250mg IV TID    #Nutrition  Course:  - FEES (7/17) failed  - ENT consulted for vallecular lesion, diagnosed vallecular cyst, no inpatient intervention required, outpatient f/u  - Patient currently extubated (7/24)  - FEES failed 7/29; patient refused evaluation  - NGT replaced (7/30)- was given 1mg ativan, nasal lidocaine gel, affrin prior to NGT insertion  - Pt Pulled NGT 7/30 at 22:00, third time, no plan to replace    Plan:  - No plan to replace, update wife, discuss GOC    #Diarrhea (resolved)  Course:  - c. diff + (7/14)  - vancomycin (7/14 -7/23) active infection dose, vancomycin prophylaxis (7/24-7/25)   Plan:  - No longer on vancomycin  - Infection prevention says contact precautions continued due to mucoid BMs    #Transaminitis (worsened)  Findings:  - Bili 1.1 (7/16) -> 1.3 (7/17) ->0.5 (7/24) -> 0.6 (7/28)  - Alk P 359 (7/16)-> 484 (7/17) -> 174 (7/24) -> 229 (7/28)  -  (7/16)->129 (7/17) -> 75 (7/24) -> 65 (7/28)  - ALT 67 (7/16)-> 72 (7/17) -> 30 (7/24) -> 229 (7/28)  - RUQ US (7/17): known liver mets, no acute findings  Plan:  - Continue atorvastatin 80 mg daily    INFECTIOUS DISEASE  #leukocytosis (resolved)  Cultures:  - Blood cultures negative throughout admission  - Sputum Cx from ETT 7/13 showed numerous gram neg krishna (Klebsiella oxytoca/raoultella ornithinolytica)  - GI PCR negative  - MRSA swab negative  - 7/29 sputum culture growing P aeruginosa   Antibiotics Course:  - Vancomycin (7/14 - 7/23), Vancomycin Prophylaxis (7/24 -7/25)  - Zosyn (7/11-7/18), resistance shown, switched to meropenem  - Meropenem (7/18-7/25)  Plan:  - No longer on antibiotics  - Continue to monitor for fevers  - If febrile start abx for pseudomonas    ENDOCRINE  #adrenal insufficiency (stable)  Findings:  - 7/5 cortisol 17   - 7/11 cortisol 37.1   Plan:  - Taper hydrocortisone 25mg QD for 2 days  - Monitor sugars with steroid    HEMATOLOGY/ONCOLOGY   # neuroendocrine tumor (unchanged)  - was on lanreotide then had POD in liver and started on peptide receptor radiotherapy (PRRT)- typically given every 8 weeks for 4 doses. He last received it 10/20/2023   - PET 5/28/24 shows new somatostatin avid osseous lesions; decreased intensely avid right supradiaphragmatic and upper abdominal nodes, suspicious for mets  - Gastrin level 5509  Plan:  - per heme/onc:     - can check factor levels V, VIII, X     - "f/u with Dr. Sophia Prasad at [facility] after discharge, no plan for treatment inpatient, if clinically improves/stabilizes then can be considered for treatment outpatient"     - c/w octreotide 250 mcg TID    #Normocytic anemia (stable)  - Pt with low Hgb 7-9  - ISO of GI bleed hx and continued melanotic BMs, there is c/o of rebleed; per GI, high risk of rebleeding, currently seems to be slow bleeding   Plan:  - Surgery and GI made aware & are following   - Continue CBC q12 unless having large melanotic stools    #DVT ppx (unchanged)  - SCDs  - LE duplex (7/17): negative for DVT    SKINS:   - Lines/Tubes - PIV, cordis replaced with central line (7/14-7/23), A line (7/20-7/29), Subclavian central line (7/24- )  - Removed A-line (R axillary) 7/29  - Removed tavarez 7/30, monitor urine output  - 2x peripheral IV right arm    Ethics:   - Full Code   - GOC 7/12, spoke to spouse (Yamilet) over phone with palliative care team, discussed GOC again on 7/16  - 7/29 - Patient expressed that he wants to die, refuses to see psych, equivocating on DNR status.   - Pulled NGT for 3rd time on 7/30  - Continue GOC discussion.    Consults this admission: GI, Heme Onc, Palliative, Endocrinology, Cardiology, Nephrology, Interventional Radiology, General Surgery MICU DOWN GRADE NOTE    Transfer from: MICU  Transfer to: (  ) Medicine    ( X ) Telemetry     (   ) RCU        (    ) Palliative         (   ) Stroke Unit        Accepting Physician:  Team (MAR) or ACP service:   Signout given to:       Patient is a 79y old  Male who presents with a chief complaint of hypotension (31 Jul 2024 10:31)      HPI:   77 yo male with metastatic neuroendocrine pancreatic cancer (tx on hold) and PMH of CAD s/p PCI x3 with most recent stent 6/12/24 on Plavix and Eliquis, chronic Afib on Eliquis, orthostatic hypotension on midodrine, PAD, recent admission for dx cath on 6/24/24 presented from PCP's office for hypotension despite taking AM midodrine dose on 7/2, admitted to medicine for symptomatic hypotension and RAZIA. Per chart, on 7/8 PM, pt was noted to have acute onset of melena x5 and coffee ground emesis x2-3. RRT was called on 7/9 AM for hypotension, hgb dropped from 9.2 to 7.4 (admission baseline 10-11), FOBT +, pt was started on PPI IV and transfused 1 unit of pRBC. GI was consulted and underwent EGD on 7/9 afternoon. MICU was consulted for uncontrolled bleeding during EGD. During EGD, pt became hypotensive and was given phenylephrine, had A-fib with RVR s/p amiodarone. Now s/p IR GDA embolization, admitted to MICU for further monitoring i.s.o hemorrhagic shock 2/2 GI bleed. On repeat EGD, oozing but no active bleeding was noted. Hospital course c/b c.diff, and patient was started on vancomycin from 7/14-7/23; vancomycin was given prophylactically from 7/24-7/25.  Patient had melanotic stool 7/18 with Hgb drop 10.1 to 7.1 and received 2 units pRBC and 1 unit platelets. Patient was reintubated and underwent EGD 7/18 afternoon. GI found duodenal bleed. Patient now s/p 3x clips and epi. Since 7/18 patient has continued to have melanotic stools with hemoglobin drops concerning for slow rebleeding. Currently, discussion revolves around the risks and benefits of antiplatelet therapy for recent stent vs risk of GI rebleed, and potential to undergo surgery. On 7/11, Pt began developing leukocytosis - was started on Zosyn. Sputum culture tested positive for CRE Klebsiella (7/13). Zosyn was then discontinued d/t resistance (7/18), and pt was started on meropenem from 7/18 to 7/25. On 7/28, pt self-removed NG tube. Pt was evaluated by speech and swallow team; however, failed evaluation. On 7/30, NGT was replaced, which pt had self-removed later on that same day. No plan to replace NGT for now.    - Total transfusions: 17 pRBC, 4 platelet, 5 FFP (as of 7/28)      INTERVAL HPI/OVERNIGHT EVENTS:      TO-DOs:  []  []  []  []  []    REVIEW OF SYSTEMS:  CONSTITUTIONAL: No fever, chills  HEENT:  No blurry vision, No sinus or throat pain  NECK: No pain or stiffness  RESPIRATORY: Cough, no wheezing, chills or hemoptysis; No shortness of breath  CARDIOVASCULAR: No chest pain, palpitations  GASTROINTESTINAL: No abdominal pain. No nausea, vomiting, or diarrhea  GENITOURINARY: No dysuria  NEUROLOGICAL: No HA, No focal weakness  SKIN: No itching, burning, rashes, or lesions   MUSCULOSKELETAL: B/l LE swelling and pain    MEDICATIONS:  atorvastatin 80 milliGRAM(s) Oral at bedtime  chlorhexidine 2% Cloths 1 Application(s) Topical <User Schedule>  chlorhexidine 4% Liquid 1 Application(s) Topical <User Schedule>  digoxin     Tablet 125 MICROGram(s) Oral daily  doxazosin 2 milliGRAM(s) Oral at bedtime  folic acid Injectable 1 milliGRAM(s) IV Push daily  insulin lispro (ADMELOG) corrective regimen sliding scale   SubCutaneous every 6 hours  lactated ringers. 1000 milliLiter(s) IV Continuous <Continuous>  midodrine 10 milliGRAM(s) Oral every 8 hours  multivitamin 1 Tablet(s) Oral daily  octreotide  Injectable 250 MICROGram(s) IV Push three times a day  pantoprazole  Injectable 40 milliGRAM(s) IV Push every 12 hours  sodium chloride 0.9% lock flush 10 milliLiter(s) IV Push every 1 hour PRN  thiamine Injectable 100 milliGRAM(s) IV Push daily      T(C): 36.9 (07-31-24 @ 16:00), Max: 37.2 (07-30-24 @ 20:00)  HR: 115 (07-31-24 @ 16:00) (94 - 142)  BP: 102/60 (07-31-24 @ 16:00) (81/42 - 127/56)  RR: 21 (07-31-24 @ 16:00) (16 - 46)  SpO2: 99% (07-31-24 @ 16:00) (93% - 100%)  Wt(kg): --Vital Signs Last 24 Hrs  T(C): 36.9 (31 Jul 2024 16:00), Max: 37.2 (30 Jul 2024 20:00)  T(F): 98.4 (31 Jul 2024 16:00), Max: 99 (30 Jul 2024 20:00)  HR: 115 (31 Jul 2024 16:00) (94 - 142)  BP: 102/60 (31 Jul 2024 16:00) (81/42 - 127/56)  BP(mean): 76 (31 Jul 2024 16:00) (56 - 86)  RR: 21 (31 Jul 2024 16:00) (16 - 46)  SpO2: 99% (31 Jul 2024 16:00) (93% - 100%)    Parameters below as of 31 Jul 2024 08:00  Patient On (Oxygen Delivery Method): room air        PHYSICAL EXAM:  General: Elderly man lying in bed, awake and alert  Cadio: Irregularly irregular rhythm; normal S1/S2; no murmurs, rubs, or gallops  Neuro: Moves extremities, follows commands; Left toe movement decreased compared to right; sensation to light touch intact b/l LE  Pulm: Clear to auscultation bilaterally; intermittent rhonchorous sounds transmitted from upper airways; normal symmetric excursion; no wheezes, rales, or rhonchi  GI: Normoactive bowel sounds; nontender, nondistended  Ext: Feet cool to touch L>R; unable to appreciated peripheral pulses due to anasarca    Consultant(s) Notes Reviewed:  [x ] YES  [ ] NO  Care Discussed with Consultants/Other Providers [ x] YES  [ ] NO    LABS:                        9.5    8.36  )-----------( 135      ( 31 Jul 2024 12:34 )             30.8     07-31    147<H>  |  108  |  28<H>  ----------------------------<  140<H>  4.4   |  25  |  0.91    Ca    7.9<L>      31 Jul 2024 12:34  Phos  3.0     07-31  Mg     1.8     07-31    TPro  5.1<L>  /  Alb  2.8<L>  /  TBili  0.9  /  DBili  x   /  AST  42<H>  /  ALT  28  /  AlkPhos  185<H>  07-31    PT/INR - ( 31 Jul 2024 00:25 )   PT: 12.4 sec;   INR: 1.19 ratio         PTT - ( 31 Jul 2024 00:25 )  PTT:24.9 sec  Urinalysis Basic - ( 31 Jul 2024 12:34 )    Color: x / Appearance: x / SG: x / pH: x  Gluc: 140 mg/dL / Ketone: x  / Bili: x / Urobili: x   Blood: x / Protein: x / Nitrite: x   Leuk Esterase: x / RBC: x / WBC x   Sq Epi: x / Non Sq Epi: x / Bacteria: x      CAPILLARY BLOOD GLUCOSE      POCT Blood Glucose.: 131 mg/dL (31 Jul 2024 12:15)  POCT Blood Glucose.: 124 mg/dL (31 Jul 2024 05:26)  POCT Blood Glucose.: 125 mg/dL (30 Jul 2024 18:31)      ABG - ( 31 Jul 2024 15:55 )  pH, Arterial: 7.46  pH, Blood: x     /  pCO2: 34    /  pO2: 83    / HCO3: 24    / Base Excess: 0.5   /  SaO2: 99.2              Urinalysis Basic - ( 31 Jul 2024 12:34 )    Color: x / Appearance: x / SG: x / pH: x  Gluc: 140 mg/dL / Ketone: x  / Bili: x / Urobili: x   Blood: x / Protein: x / Nitrite: x   Leuk Esterase: x / RBC: x / WBC x   Sq Epi: x / Non Sq Epi: x / Bacteria: x        RADIOLOGY & ADDITIONAL TESTS:    Imaging Personally Reviewed:  [x ] YES  [ ] NO    ASSESSMENT:  77 yo male with metastatic neuroendocrine pancreatic cancer (tx on hold) and PMH of CAD s/p PCI x3 with most recent stent 6/12/24 on Plavix and eliquis , chronic Afib on eliquis, orthostatic hypotension on midodrine, PAD , recent admission for dx cath on 6/24/24 presented from PCP's office for hypotension despite taking AM midodrine dose on 7/2, admitted to medicine for symptomatic hypotension and RZAIA. Per chart, on 7/8 PM, pt was noted to have acute onset of melena x5 and coffee ground emesis x2-3. RRT was called on 7/9 AM for hypotension, hgb dropped from 9.2 to 7.4 (admission baseline 10-11), FOBT +, pt was started on PPI IV and transfused 1 unit of pRBC. GI was consulted and underwent EGD on 7/9 afternoon. MICU was consulted for uncontrolled bleeding during EGD. During EGD, pt became hypotensive and was given phenylephrine, had A-fib with RVR s/p amiodarone. Now s/p IR GDA embolization, admitted to MICU for further monitoring i.s.o hemorrhagic shock 2/2 GI bleed. On repeat EGD, oozing but no active bleeding was noted. Hospital course c/b c.diff, and patient has been placed on vancomycin. Patient had melanotic stool 7/18 with Hgb drop 10.1 to 7.1 and received 2 units pRBC and 1 unit platelets. Patient was reintubated and underwent EGD 7/18 afternoon. GI found duodenal bleed. Patient now s/p 3x clips and epi. Since 7/18 patient has continued to have melanotic stools with hemoglobin drops concerning for slow rebleeding. Currently, discussion revolves around the risks and benefits of antiplatelet therapy for recent stent vs risk of GI rebleed, and potential to undergo surgery.    - Total transfusions: 17 pRBC, 4 platelet, 5 FFP (as of 7/28)    Plan:   NEUROLOGIC  # Baseline AOx3 (waxing/waning)  Course:  - intubated 7/9, extubated 7/15  - reintubated 7/18 for EGD, extubated 7/22  -reintubated 7/23, extubated  - not sedated  - Currently AOx2-3  Plan:  - Delirium precautions    CARDIOVASCULAR  # hemorrhagic shock (worsened)  Course:  - Per spouse, pt's baseline BP is a little bit over 100  - 7/9: RRT called 7/9 for hypotension; 2/2 to Upper GI bleeds, urgently took to EGD, found bleeding ulcer that was not well controlled, underwent IR GDA embolization  - 7/18, patient had large melanotic stool with Hgb drop from 10.1-7.1, patient was restarted on henny, vaso, and levo for reintubation for GI scope; s/p 2pRBC, 1 platelet  - Total transfusions: 16 pRBC, 4 platelet, 5 FFP  Findings:  - AM cortisol 17 (7/5), AM cortisol 37.1 (7/11)  - POCUS ECHO (7/16) showed no cardiogenic shock, no tamponade, low SV indeterminate IVC, irregular B lines but not concerning for pulmonary edema, and some subdiaphragmatic fluid  Plan:  - Continue to monitor CBC, transfuse for Hgb <7  - Continue to hold Plavix 75 mg  - Received midodrine 10 once on 7/30 prior to pulling NGT    #CAD s/p PCI x3, most recent stent 6/12/24, NURIA to pLAD (stable)  Course:  - 7/13 restarted plavix for new stent (6/12/24) per GI, 7/18 held plavix due to bleeding  - 7/22 started low-dose aspirin as safer alternative to plavix per cardiology, but d/c 7/23 as patient bled again  Plan:  - Continue to hold Plavix 75 mg and aspirin 81 mg    #PAD  # A-fib on eliquis (worsening)  May have been worsened because of GI bleed  - s/p successful DCCV 10/31   - 7/16 In RVR, 2 doses of amio overnight; Cards recommended resuming home dose sotalol 40 mg PO (qTC wnl), s/p 1 dose sotalol but RVR persisted  - 7/16 Started amio loading  - 7/22 Added digoxin load for persistent HR 130s over past week  - 7/23 Dig level 1.3, patient's HR back in 60s in the afternoon  - 7/28 Patient pulled NG tube, unable to get several doses of amio, back in afib with RVR  - 7/30 NGT replaced - was pulled out later in the PM  Plan:  - Hold eliquis   - Hold home sotalol  - Cardiology following, advises against chemical cardioversion off AC if possible  - digoxin lvl 1.3  - Start digoxin 62.5 micrograms IV push daily   - Repeat digoxin level on 8/3    #NSVT (stable)  Likely 2/2 cardiac structural damage given prior stents as well as electrolyte abnormalities while being diuresed. Troponins elevated but stable today. Troponins also elevated earlier in admission. EKG without ST elevations.  Plan:  - Continue to monitor  - Replete electrolytes as necessary    PULMONARY  #Intubated for airway protection prior to EGD/IR embolization (resolved)  Course:  - Extubated 7/22, SpO2 100% on RA, reintubated 7/27, Extubated 7/28    RENAL/  #RAZIA (resolved)  - Cr peaked at 3.38 (7/11)  - Currently Cr 1.13 ( 7/24)  Plan:  - Continue to monitor Cr    #ATN i.s.o hypotension  #metabolic acidosis (improving)  Patient was in a state of metabolic acidosis around 7/16, but recent ABG pH in normal range. However, patient has been bicarb deficient throughout the admission.   Plan:  - f/u nephro recs  - hold sodium bicarb 650mg tid considering improvement in hypernatremia and bicarb lvls  - Per nephro, patient is not a dialysis candidate  - trend BUN/Cr   - monitor Is and Os     #Hypernatremia (improving)  Patient having a gradually increasing sodium since transfer to the MICU. Na was improving by adding free water, lowering the bicarb drip, and diuresis with bumex/metolazone. Now downtrending, will hold diuresis.  Plan:  - stopped free water 300mL q6  - hold sodium bicarb 650mg TID given improvement  - hold bumex 1mg q12 IV, metolazone 5mg PO QD    #Urinary retention (improving)  Plan:  - Started Doxazosin 2 mg daily (7/15)  - Monitor I/Os  - Tavarez placed by urology 7/19, making urine  - Removed tavarez 7/30    GASTROINTESTINAL  #Upper GI Bleed (stable)  Course:  - 7/9 CT A/P - Active bleeding in the third portion of the duodenum. Progression of liver metastases.  - 7/9 EGD showed esophagitis, One non-bleeding duodenal ulcer with a clean ulcer base (Arjun Class III). One oozing duodenal ulcer with spurting hemorrhage (Arjun Class Ia). Treatment not successful. Treated with bipolar cautery.  - 7/9 s/p IR GDA embolization  - 7/18 Patient had melanotic stool with hemoglobin drop 10.1 to 7.1; EGD showing duodenal bleed s/p 3 clips + epi  - CTA (7/20) showed no active bleed  - H. pylori negative  - Total EGDs: 3  Plan:  - Continue pantoprazole 40 mg IV q12  - monitor Hgb, transfuse as needed for Hgb <7  - continue to f/u with GI and IR     - Per GI, "Will need PPI BID for 8 weeks for grade D esophagitis and PUD"    - Per GI, high risk of 30 day rebleed (>10-20% risk)  - No acute IR intervention per IR because no active area of extravasation on imaging (7/21)  - No acute GI intervention per GI (7/21)  - Surgery evaluated patient 7/23, may be a candidate for duodenotomy to oversew ulcers, but patient cannot be on aspirin  - Reached out to GI (7/26) about longer-term goals including risk of not being on aspirin vs bleeding;  ·Per GI, still a risks vs benefits situation as he may bleed again with ASA, patient may require a surgical intervention next  - As per heme/onc, c/w octreotide 250mg IV TID    #Nutrition  Course:  - FEES (7/17) failed  - ENT consulted for vallecular lesion, diagnosed vallecular cyst, no inpatient intervention required, outpatient f/u  - Patient currently extubated (7/24)  - FEES failed 7/29; patient refused evaluation  - NGT replaced (7/30)- was given 1mg ativan, nasal lidocaine gel, affrin prior to NGT insertion  - Pt Pulled NGT 7/30 at 22:00, third time, no plan to replace    Plan:  - No plan to replace, update wife, discuss GOC    #Diarrhea (resolved)  Course:  - c. diff + (7/14)  - vancomycin (7/14 -7/23) active infection dose, vancomycin prophylaxis (7/24-7/25)   Plan:  - No longer on vancomycin  - Infection prevention says contact precautions continued due to mucoid BMs    #Transaminitis (worsened)  Findings:  - Bili 1.1 (7/16) -> 1.3 (7/17) ->0.5 (7/24) -> 0.6 (7/28)  - Alk P 359 (7/16)-> 484 (7/17) -> 174 (7/24) -> 229 (7/28)  -  (7/16)->129 (7/17) -> 75 (7/24) -> 65 (7/28)  - ALT 67 (7/16)-> 72 (7/17) -> 30 (7/24) -> 229 (7/28)  - RUQ US (7/17): known liver mets, no acute findings  Plan:  - Continue atorvastatin 80 mg daily    INFECTIOUS DISEASE  #leukocytosis (resolved)  Cultures:  - Blood cultures negative throughout admission  - Sputum Cx from ETT 7/13 showed numerous gram neg krishna (Klebsiella oxytoca/raoultella ornithinolytica)  - GI PCR negative  - MRSA swab negative  - 7/29 sputum culture growing P aeruginosa   Antibiotics Course:  - Vancomycin (7/14 - 7/23), Vancomycin Prophylaxis (7/24 -7/25)  - Zosyn (7/11-7/18), resistance shown, switched to meropenem  - Meropenem (7/18-7/25)  Plan:  - No longer on antibiotics  - Continue to monitor for fevers  - If febrile start abx for pseudomonas    ENDOCRINE  #adrenal insufficiency (stable)  Findings:  - 7/5 cortisol 17   - 7/11 cortisol 37.1   Plan:  - Taper hydrocortisone 25mg QD for 2 days  - Monitor sugars with steroid    HEMATOLOGY/ONCOLOGY   # neuroendocrine tumor (unchanged)  - was on lanreotide then had POD in liver and started on peptide receptor radiotherapy (PRRT)- typically given every 8 weeks for 4 doses. He last received it 10/20/2023   - PET 5/28/24 shows new somatostatin avid osseous lesions; decreased intensely avid right supradiaphragmatic and upper abdominal nodes, suspicious for mets  - Gastrin level 5509  Plan:  - per heme/onc:     - can check factor levels V, VIII, X     - [place] [facility] [place] plan for treatment inpatient, if clinically improves/stabilizes then can be considered for treatment outpatient"     - c/w octreotide 250 mcg TID    #Normocytic anemia (stable)  - Pt with low Hgb 7-9  - ISO of GI bleed hx and continued melanotic BMs, there is c/o of rebleed; per GI, high risk of rebleeding, currently seems to be slow bleeding   Plan:  - Surgery and GI made aware & are following   - Continue CBC q12 unless having large melanotic stools    #DVT ppx (unchanged)  - SCDs  - LE duplex (7/17): negative for DVT    SKINS:   - Lines/Tubes - PIV, cordis replaced with central line (7/14-7/23), A line (7/20-7/29), Subclavian central line (7/24- )  - Removed A-line (R axillary) 7/29  - Removed tavarez 7/30, monitor urine output  - 2x peripheral IV right arm    Ethics:   - Full Code   - GOC 7/12, spoke to spouse (Yaimlet) over phone with palliative care team, discussed GOC again on 7/16  - 7/29 - Patient expressed that he wants to die, refuses to see psych, equivocating on DNR status.   - Pulled NGT for 3rd time on 7/30  - Continue GOC discussion.    Consults this admission: GI, Heme Onc, Palliative, Endocrinology, Cardiology, Nephrology, Interventional Radiology, General Surgery MICU DOWN GRADE NOTE    Transfer from: MICU  Transfer to: (  ) Medicine    ( X ) Telemetry     (   ) RCU        (    ) Palliative         (   ) Stroke Unit        Accepting Physician: Dr Oly Willis  Team (MAR) or ACP service:   Signout given to:       Patient is a 79y old  Male who presents with a chief complaint of hypotension (31 Jul 2024 10:31)      HPI:   77 yo male with metastatic neuroendocrine pancreatic cancer (tx on hold) and PMH of CAD s/p PCI x3 with most recent stent 6/12/24 on Plavix and Eliquis, chronic Afib on Eliquis, orthostatic hypotension on midodrine, PAD, recent admission for dx cath on 6/24/24 presented from PCP's office for hypotension despite taking AM midodrine dose on 7/2, admitted to medicine for symptomatic hypotension and RAZIA. Per chart, on 7/8 PM, pt was noted to have acute onset of melena x5 and coffee ground emesis x2-3. RRT was called on 7/9 AM for hypotension, hgb dropped from 9.2 to 7.4 (admission baseline 10-11), FOBT +, pt was started on PPI IV and transfused 1 unit of pRBC. GI was consulted and underwent EGD on 7/9 afternoon. MICU was consulted for uncontrolled bleeding during EGD. During EGD, pt became hypotensive and was given phenylephrine, had A-fib with RVR s/p amiodarone. Now s/p IR GDA embolization, admitted to MICU for further monitoring i.s.o hemorrhagic shock 2/2 GI bleed. On repeat EGD, oozing but no active bleeding was noted. Hospital course c/b c.diff, and patient was started on vancomycin from 7/14-7/23; vancomycin was given prophylactically from 7/24-7/25.  Patient had melanotic stool 7/18 with Hgb drop 10.1 to 7.1 and received 2 units pRBC and 1 unit platelets. Patient was reintubated and underwent EGD 7/18 afternoon. GI found duodenal bleed. Patient now s/p 3x clips and epi. Since 7/18 patient has continued to have melanotic stools with hemoglobin drops concerning for slow rebleeding. Currently, discussion revolves around the risks and benefits of antiplatelet therapy for recent stent vs risk of GI rebleed, and potential to undergo surgery. On 7/11, Pt began developing leukocytosis - was started on Zosyn. Sputum culture tested positive for CRE Klebsiella (7/13). Zosyn was then discontinued d/t resistance (7/18), and pt was started on meropenem from 7/18 to 7/25. On 7/28, pt self-removed NG tube. Pt was evaluated by speech and swallow team; however, failed evaluation. On 7/30, NGT was replaced, which pt had self-removed later on that same day. No plan to replace NGT for now.    - Total transfusions: 17 pRBC, 4 platelet, 5 FFP (as of 7/28)      INTERVAL HPI/OVERNIGHT EVENTS:      TO-DOs:  []  []  []  []  []    REVIEW OF SYSTEMS:  CONSTITUTIONAL: No fever, chills  HEENT:  No blurry vision, No sinus or throat pain  NECK: No pain or stiffness  RESPIRATORY: Cough, no wheezing, chills or hemoptysis; No shortness of breath  CARDIOVASCULAR: No chest pain, palpitations  GASTROINTESTINAL: No abdominal pain. No nausea, vomiting, or diarrhea  GENITOURINARY: No dysuria  NEUROLOGICAL: No HA, No focal weakness  SKIN: No itching, burning, rashes, or lesions   MUSCULOSKELETAL: B/l LE swelling and pain    MEDICATIONS:  atorvastatin 80 milliGRAM(s) Oral at bedtime  chlorhexidine 2% Cloths 1 Application(s) Topical <User Schedule>  chlorhexidine 4% Liquid 1 Application(s) Topical <User Schedule>  digoxin     Tablet 125 MICROGram(s) Oral daily  doxazosin 2 milliGRAM(s) Oral at bedtime  folic acid Injectable 1 milliGRAM(s) IV Push daily  insulin lispro (ADMELOG) corrective regimen sliding scale   SubCutaneous every 6 hours  lactated ringers. 1000 milliLiter(s) IV Continuous <Continuous>  midodrine 10 milliGRAM(s) Oral every 8 hours  multivitamin 1 Tablet(s) Oral daily  octreotide  Injectable 250 MICROGram(s) IV Push three times a day  pantoprazole  Injectable 40 milliGRAM(s) IV Push every 12 hours  sodium chloride 0.9% lock flush 10 milliLiter(s) IV Push every 1 hour PRN  thiamine Injectable 100 milliGRAM(s) IV Push daily      T(C): 36.9 (07-31-24 @ 16:00), Max: 37.2 (07-30-24 @ 20:00)  HR: 115 (07-31-24 @ 16:00) (94 - 142)  BP: 102/60 (07-31-24 @ 16:00) (81/42 - 127/56)  RR: 21 (07-31-24 @ 16:00) (16 - 46)  SpO2: 99% (07-31-24 @ 16:00) (93% - 100%)  Wt(kg): --Vital Signs Last 24 Hrs  T(C): 36.9 (31 Jul 2024 16:00), Max: 37.2 (30 Jul 2024 20:00)  T(F): 98.4 (31 Jul 2024 16:00), Max: 99 (30 Jul 2024 20:00)  HR: 115 (31 Jul 2024 16:00) (94 - 142)  BP: 102/60 (31 Jul 2024 16:00) (81/42 - 127/56)  BP(mean): 76 (31 Jul 2024 16:00) (56 - 86)  RR: 21 (31 Jul 2024 16:00) (16 - 46)  SpO2: 99% (31 Jul 2024 16:00) (93% - 100%)    Parameters below as of 31 Jul 2024 08:00  Patient On (Oxygen Delivery Method): room air        PHYSICAL EXAM:  General: Elderly man lying in bed, awake and alert  Cadio: Irregularly irregular rhythm; normal S1/S2; no murmurs, rubs, or gallops  Neuro: Moves extremities, follows commands; Left toe movement decreased compared to right; sensation to light touch intact b/l LE  Pulm: Clear to auscultation bilaterally; intermittent rhonchorous sounds transmitted from upper airways; normal symmetric excursion; no wheezes, rales, or rhonchi  GI: Normoactive bowel sounds; nontender, nondistended  Ext: Feet cool to touch L>R; unable to appreciated peripheral pulses due to anasarca    Consultant(s) Notes Reviewed:  [x ] YES  [ ] NO  Care Discussed with Consultants/Other Providers [ x] YES  [ ] NO    LABS:                        9.5    8.36  )-----------( 135      ( 31 Jul 2024 12:34 )             30.8     07-31    147<H>  |  108  |  28<H>  ----------------------------<  140<H>  4.4   |  25  |  0.91    Ca    7.9<L>      31 Jul 2024 12:34  Phos  3.0     07-31  Mg     1.8     07-31    TPro  5.1<L>  /  Alb  2.8<L>  /  TBili  0.9  /  DBili  x   /  AST  42<H>  /  ALT  28  /  AlkPhos  185<H>  07-31    PT/INR - ( 31 Jul 2024 00:25 )   PT: 12.4 sec;   INR: 1.19 ratio         PTT - ( 31 Jul 2024 00:25 )  PTT:24.9 sec  Urinalysis Basic - ( 31 Jul 2024 12:34 )    Color: x / Appearance: x / SG: x / pH: x  Gluc: 140 mg/dL / Ketone: x  / Bili: x / Urobili: x   Blood: x / Protein: x / Nitrite: x   Leuk Esterase: x / RBC: x / WBC x   Sq Epi: x / Non Sq Epi: x / Bacteria: x      CAPILLARY BLOOD GLUCOSE      POCT Blood Glucose.: 131 mg/dL (31 Jul 2024 12:15)  POCT Blood Glucose.: 124 mg/dL (31 Jul 2024 05:26)  POCT Blood Glucose.: 125 mg/dL (30 Jul 2024 18:31)      ABG - ( 31 Jul 2024 15:55 )  pH, Arterial: 7.46  pH, Blood: x     /  pCO2: 34    /  pO2: 83    / HCO3: 24    / Base Excess: 0.5   /  SaO2: 99.2              Urinalysis Basic - ( 31 Jul 2024 12:34 )    Color: x / Appearance: x / SG: x / pH: x  Gluc: 140 mg/dL / Ketone: x  / Bili: x / Urobili: x   Blood: x / Protein: x / Nitrite: x   Leuk Esterase: x / RBC: x / WBC x   Sq Epi: x / Non Sq Epi: x / Bacteria: x        RADIOLOGY & ADDITIONAL TESTS:    Imaging Personally Reviewed:  [x ] YES  [ ] NO    ASSESSMENT:  77 yo male with metastatic neuroendocrine pancreatic cancer (tx on hold) and PMH of CAD s/p PCI x3 with most recent stent 6/12/24 on Plavix and eliquis , chronic Afib on eliquis, orthostatic hypotension on midodrine, PAD , recent admission for dx cath on 6/24/24 presented from PCP's office for hypotension despite taking AM midodrine dose on 7/2, admitted to medicine for symptomatic hypotension and RAZIA. Per chart, on 7/8 PM, pt was noted to have acute onset of melena x5 and coffee ground emesis x2-3. RRT was called on 7/9 AM for hypotension, hgb dropped from 9.2 to 7.4 (admission baseline 10-11), FOBT +, pt was started on PPI IV and transfused 1 unit of pRBC. GI was consulted and underwent EGD on 7/9 afternoon. MICU was consulted for uncontrolled bleeding during EGD. During EGD, pt became hypotensive and was given phenylephrine, had A-fib with RVR s/p amiodarone. Now s/p IR GDA embolization, admitted to MICU for further monitoring i.s.o hemorrhagic shock 2/2 GI bleed. On repeat EGD, oozing but no active bleeding was noted. Hospital course c/b c.diff, and patient has been placed on vancomycin. Patient had melanotic stool 7/18 with Hgb drop 10.1 to 7.1 and received 2 units pRBC and 1 unit platelets. Patient was reintubated and underwent EGD 7/18 afternoon. GI found duodenal bleed. Patient now s/p 3x clips and epi. Since 7/18 patient has continued to have melanotic stools with hemoglobin drops concerning for slow rebleeding. Currently, discussion revolves around the risks and benefits of antiplatelet therapy for recent stent vs risk of GI rebleed, and potential to undergo surgery.    - Total transfusions: 17 pRBC, 4 platelet, 5 FFP (as of 7/28)    Plan:   NEUROLOGIC  # Baseline AOx3 (waxing/waning)  Course:  - intubated 7/9, extubated 7/15  - reintubated 7/18 for EGD, extubated 7/22  -reintubated 7/23, extubated  - not sedated  - Currently AOx2-3  Plan:  - Delirium precautions    CARDIOVASCULAR  # hemorrhagic shock (worsened)  Course:  - Per spouse, pt's baseline BP is a little bit over 100  - 7/9: RRT called 7/9 for hypotension; 2/2 to Upper GI bleeds, urgently took to EGD, found bleeding ulcer that was not well controlled, underwent IR GDA embolization  - 7/18, patient had large melanotic stool with Hgb drop from 10.1-7.1, patient was restarted on henny, vaso, and levo for reintubation for GI scope; s/p 2pRBC, 1 platelet  - Total transfusions: 16 pRBC, 4 platelet, 5 FFP  Findings:  - AM cortisol 17 (7/5), AM cortisol 37.1 (7/11)  - POCUS ECHO (7/16) showed no cardiogenic shock, no tamponade, low SV indeterminate IVC, irregular B lines but not concerning for pulmonary edema, and some subdiaphragmatic fluid  Plan:  - Continue to monitor CBC, transfuse for Hgb <7  - Continue to hold Plavix 75 mg  - Received midodrine 10 once on 7/30 prior to pulling NGT    #CAD s/p PCI x3, most recent stent 6/12/24, NURIA to pLAD (stable)  Course:  - 7/13 restarted plavix for new stent (6/12/24) per GI, 7/18 held plavix due to bleeding  - 7/22 started low-dose aspirin as safer alternative to plavix per cardiology, but d/c 7/23 as patient bled again  Plan:  - Continue to hold Plavix 75 mg and aspirin 81 mg    #PAD  # A-fib on eliquis (worsening)  May have been worsened because of GI bleed  - s/p successful DCCV 10/31   - 7/16 In RVR, 2 doses of amio overnight; Cards recommended resuming home dose sotalol 40 mg PO (qTC wnl), s/p 1 dose sotalol but RVR persisted  - 7/16 Started amio loading  - 7/22 Added digoxin load for persistent HR 130s over past week  - 7/23 Dig level 1.3, patient's HR back in 60s in the afternoon  - 7/28 Patient pulled NG tube, unable to get several doses of amio, back in afib with RVR  - 7/30 NGT replaced - was pulled out later in the PM  Plan:  - Hold eliquis   - Hold home sotalol  - Cardiology following, advises against chemical cardioversion off AC if possible  - digoxin lvl 1.3  - Start digoxin 62.5 micrograms IV push daily   - Repeat digoxin level on 8/3    #NSVT (stable)  Likely 2/2 cardiac structural damage given prior stents as well as electrolyte abnormalities while being diuresed. Troponins elevated but stable today. Troponins also elevated earlier in admission. EKG without ST elevations.  Plan:  - Continue to monitor  - Replete electrolytes as necessary    PULMONARY  #Intubated for airway protection prior to EGD/IR embolization (resolved)  Course:  - Extubated 7/22, SpO2 100% on RA, reintubated 7/27, Extubated 7/28    RENAL/  #RAZIA (resolved)  - Cr peaked at 3.38 (7/11)  - Currently Cr 1.13 ( 7/24)  Plan:  - Continue to monitor Cr    #ATN i.s.o hypotension  #metabolic acidosis (improving)  Patient was in a state of metabolic acidosis around 7/16, but recent ABG pH in normal range. However, patient has been bicarb deficient throughout the admission.   Plan:  - f/u nephro recs  - hold sodium bicarb 650mg tid considering improvement in hypernatremia and bicarb lvls  - Per nephro, patient is not a dialysis candidate  - trend BUN/Cr   - monitor Is and Os     #Hypernatremia (improving)  Patient having a gradually increasing sodium since transfer to the MICU. Na was improving by adding free water, lowering the bicarb drip, and diuresis with bumex/metolazone. Now downtrending, will hold diuresis.  Plan:  - stopped free water 300mL q6  - hold sodium bicarb 650mg TID given improvement  - hold bumex 1mg q12 IV, metolazone 5mg PO QD    #Urinary retention (improving)  Plan:  - Started Doxazosin 2 mg daily (7/15)  - Monitor I/Os  - Tavarez placed by urology 7/19, making urine  - Removed tavarez 7/30    GASTROINTESTINAL  #Upper GI Bleed (stable)  Course:  - 7/9 CT A/P - Active bleeding in the third portion of the duodenum. Progression of liver metastases.  - 7/9 EGD showed esophagitis, One non-bleeding duodenal ulcer with a clean ulcer base (Arjun Class III). One oozing duodenal ulcer with spurting hemorrhage (Arjun Class Ia). Treatment not successful. Treated with bipolar cautery.  - 7/9 s/p IR GDA embolization  - 7/18 Patient had melanotic stool with hemoglobin drop 10.1 to 7.1; EGD showing duodenal bleed s/p 3 clips + epi  - CTA (7/20) showed no active bleed  - H. pylori negative  - Total EGDs: 3  Plan:  - Continue pantoprazole 40 mg IV q12  - monitor Hgb, transfuse as needed for Hgb <7  - continue to f/u with GI and IR     - Per GI, "Will need PPI BID for 8 weeks for grade D esophagitis and PUD"    - Per GI, high risk of 30 day rebleed (>10-20% risk)  - No acute IR intervention per IR because no active area of extravasation on imaging (7/21)  - No acute GI intervention per GI (7/21)  - Surgery evaluated patient 7/23, may be a candidate for duodenotomy to oversew ulcers, but patient cannot be on aspirin  - Reached out to GI (7/26) about longer-term goals including risk of not being on aspirin vs bleeding;  ·Per GI, still a risks vs benefits situation as he may bleed again with ASA, patient may require a surgical intervention next  - As per heme/onc, c/w octreotide 250mg IV TID    #Nutrition  Course:  - FEES (7/17) failed  - ENT consulted for vallecular lesion, diagnosed vallecular cyst, no inpatient intervention required, outpatient f/u  - Patient currently extubated (7/24)  - FEES failed 7/29; patient refused evaluation  - NGT replaced (7/30)- was given 1mg ativan, nasal lidocaine gel, affrin prior to NGT insertion  - Pt Pulled NGT 7/30 at 22:00, third time, no plan to replace    Plan:  - No plan to replace, update wife, discuss GOC    #Diarrhea (resolved)  Course:  - c. diff + (7/14)  - vancomycin (7/14 -7/23) active infection dose, vancomycin prophylaxis (7/24-7/25)   Plan:  - No longer on vancomycin  - Infection prevention says contact precautions continued due to mucoid BMs    #Transaminitis (worsened)  Findings:  - Bili 1.1 (7/16) -> 1.3 (7/17) ->0.5 (7/24) -> 0.6 (7/28)  - Alk P 359 (7/16)-> 484 (7/17) -> 174 (7/24) -> 229 (7/28)  -  (7/16)->129 (7/17) -> 75 (7/24) -> 65 (7/28)  - ALT 67 (7/16)-> 72 (7/17) -> 30 (7/24) -> 229 (7/28)  - RUQ US (7/17): known liver mets, no acute findings  Plan:  - Continue atorvastatin 80 mg daily    INFECTIOUS DISEASE  #leukocytosis (resolved)  Cultures:  - Blood cultures negative throughout admission  - Sputum Cx from ETT 7/13 showed numerous gram neg krishna (Klebsiella oxytoca/raoultella ornithinolytica)  - GI PCR negative  - MRSA swab negative  - 7/29 sputum culture growing P aeruginosa   Antibiotics Course:  - Vancomycin (7/14 - 7/23), Vancomycin Prophylaxis (7/24 -7/25)  - Zosyn (7/11-7/18), resistance shown, switched to meropenem  - Meropenem (7/18-7/25)  Plan:  - No longer on antibiotics  - Continue to monitor for fevers  - If febrile start abx for pseudomonas    ENDOCRINE  #adrenal insufficiency (stable)  Findings:  - 7/5 cortisol 17   - 7/11 cortisol 37.1   Plan:  - Taper hydrocortisone 25mg QD for 2 days  - Monitor sugars with steroid    HEMATOLOGY/ONCOLOGY   # neuroendocrine tumor (unchanged)  - was on lanreotide then had POD in liver and started on peptide receptor radiotherapy (PRRT)- typically given every 8 weeks for 4 doses. He last received it 10/20/2023   - PET 5/28/24 shows new somatostatin avid osseous lesions; decreased intensely avid right supradiaphragmatic and upper abdominal nodes, suspicious for mets  - Gastrin level 5509  Plan:  - per heme/onc:     - can check factor levels V, VIII, X     - "f/u with Dr. Sophia Prasad at [facility] [place] after discharge, no plan for treatment inpatient, if clinically improves/stabilizes then can be considered for treatment outpatient"     - c/w octreotide 250 mcg TID    #Normocytic anemia (stable)  - Pt with low Hgb 7-9  - ISO of GI bleed hx and continued melanotic BMs, there is c/o of rebleed; per GI, high risk of rebleeding, currently seems to be slow bleeding   Plan:  - Surgery and GI made aware & are following   - Continue CBC q12 unless having large melanotic stools    #DVT ppx (unchanged)  - SCDs  - LE duplex (7/17): negative for DVT    SKINS:   - Lines/Tubes - PIV, cordis replaced with central line (7/14-7/23), A line (7/20-7/29), Subclavian central line (7/24- )  - Removed A-line (R axillary) 7/29  - Removed tavarez 7/30, monitor urine output  - 2x peripheral IV right arm    Ethics:   - Full Code   - GOC 7/12, spoke to spouse (Yamilet) over phone with palliative care team, discussed GOC again on 7/16  - 7/29 - Patient expressed that he wants to die, refuses to see psych, equivocating on DNR status.   - Pulled NGT for 3rd time on 7/30  - Continue GOC discussion.    Consults this admission: GI, Heme Onc, Palliative, Endocrinology, Cardiology, Nephrology, Interventional Radiology, General Surgery MICU DOWN GRADE NOTE    Transfer from: MICU  Transfer to: (  ) Medicine    ( X ) Telemetry     (   ) RCU        (    ) Palliative         (   ) Stroke Unit        Accepting Physician: Dr. Keith Willis  Team (MAR) or ACP service:   Signout given to:       Patient is a 79y old  Male who presents with a chief complaint of hypotension (31 Jul 2024 10:31)      HPI:   80 yo male with metastatic neuroendocrine pancreatic cancer (tx on hold) and PMH of CAD s/p PCI x3 with most recent stent 6/12/24 on Plavix and Eliquis, chronic Afib on Eliquis, orthostatic hypotension on midodrine, PAD, recent admission for dx cath on 6/24/24 presented from PCP's office for hypotension despite taking AM midodrine dose on 7/2, admitted to medicine for symptomatic hypotension and RAZIA. Per chart, on 7/8 PM, pt was noted to have acute onset of melena x5 and coffee ground emesis x2-3. RRT was called on 7/9 AM for hypotension, hgb dropped from 9.2 to 7.4 (admission baseline 10-11), FOBT +, pt was started on PPI IV and transfused 1 unit of pRBC. GI was consulted and underwent EGD on 7/9 afternoon. MICU was consulted for uncontrolled bleeding during EGD. During EGD, pt became hypotensive and was given phenylephrine, had A-fib with RVR s/p amiodarone. Now s/p IR GDA embolization, admitted to MICU for further monitoring i.s.o hemorrhagic shock 2/2 GI bleed. On repeat EGD, oozing but no active bleeding was noted. Hospital course c/b c.diff, and patient was started on vancomycin from 7/14-7/23; vancomycin was given prophylactically from 7/24-7/25. Patient had melanotic stool 7/18 with Hgb drop 10.1 to 7.1 and received 2 units pRBC and 1 unit platelets. Patient was reintubated and underwent EGD 7/18 afternoon. GI found duodenal bleed. Patient now s/p 3x clips and epi. Since 7/18 patient has continued to have melanotic stools with hemoglobin drops concerning for slow rebleeding. Currently, discussion revolves around the risks and benefits of antiplatelet therapy for recent stent vs risk of GI rebleed, and potential to undergo surgery. Last blood transfusion was given on 7/28; pt's Hgb has been stable since then. On 7/11, pt began developing leukocytosis - was started on Zosyn. Sputum culture tested positive for CRE Klebsiella (7/13). Zosyn was then discontinued d/t resistance (7/18), and pt was started on meropenem from 7/18 to 7/25. On 7/28, pt self-removed NG tube. Pt was evaluated by speech and swallow team; however, failed evaluation. On 7/30, NGT was replaced, which pt had self-removed later on that same day. No plan to replace NGT for now.    - Total transfusions: 17 pRBC, 4 platelet, 5 FFP (as of 7/28)      INTERVAL HPI/OVERNIGHT EVENTS:      TO-DOs:  []  []  []  []  []    REVIEW OF SYSTEMS:  CONSTITUTIONAL: No fever, chills  HEENT:  No blurry vision, No sinus or throat pain  NECK: No pain or stiffness  RESPIRATORY: Cough, no wheezing, chills or hemoptysis; No shortness of breath  CARDIOVASCULAR: No chest pain, palpitations  GASTROINTESTINAL: No abdominal pain. No nausea, vomiting, or diarrhea  GENITOURINARY: No dysuria  NEUROLOGICAL: No HA, No focal weakness  SKIN: No itching, burning, rashes, or lesions   MUSCULOSKELETAL: B/l LE swelling and pain    MEDICATIONS:  atorvastatin 80 milliGRAM(s) Oral at bedtime  chlorhexidine 2% Cloths 1 Application(s) Topical <User Schedule>  chlorhexidine 4% Liquid 1 Application(s) Topical <User Schedule>  digoxin     Tablet 125 MICROGram(s) Oral daily  doxazosin 2 milliGRAM(s) Oral at bedtime  folic acid Injectable 1 milliGRAM(s) IV Push daily  insulin lispro (ADMELOG) corrective regimen sliding scale   SubCutaneous every 6 hours  lactated ringers. 1000 milliLiter(s) IV Continuous <Continuous>  midodrine 10 milliGRAM(s) Oral every 8 hours  multivitamin 1 Tablet(s) Oral daily  octreotide  Injectable 250 MICROGram(s) IV Push three times a day  pantoprazole  Injectable 40 milliGRAM(s) IV Push every 12 hours  sodium chloride 0.9% lock flush 10 milliLiter(s) IV Push every 1 hour PRN  thiamine Injectable 100 milliGRAM(s) IV Push daily      T(C): 36.9 (07-31-24 @ 16:00), Max: 37.2 (07-30-24 @ 20:00)  HR: 115 (07-31-24 @ 16:00) (94 - 142)  BP: 102/60 (07-31-24 @ 16:00) (81/42 - 127/56)  RR: 21 (07-31-24 @ 16:00) (16 - 46)  SpO2: 99% (07-31-24 @ 16:00) (93% - 100%)  Wt(kg): --Vital Signs Last 24 Hrs  T(C): 36.9 (31 Jul 2024 16:00), Max: 37.2 (30 Jul 2024 20:00)  T(F): 98.4 (31 Jul 2024 16:00), Max: 99 (30 Jul 2024 20:00)  HR: 115 (31 Jul 2024 16:00) (94 - 142)  BP: 102/60 (31 Jul 2024 16:00) (81/42 - 127/56)  BP(mean): 76 (31 Jul 2024 16:00) (56 - 86)  RR: 21 (31 Jul 2024 16:00) (16 - 46)  SpO2: 99% (31 Jul 2024 16:00) (93% - 100%)    Parameters below as of 31 Jul 2024 08:00  Patient On (Oxygen Delivery Method): room air        PHYSICAL EXAM:  General: Elderly man lying in bed, awake and alert  Cadio: Irregularly irregular rhythm; normal S1/S2; no murmurs, rubs, or gallops  Neuro: Moves extremities, follows commands; Left toe movement decreased compared to right; sensation to light touch intact b/l LE  Pulm: Clear to auscultation bilaterally; intermittent rhonchorous sounds transmitted from upper airways; normal symmetric excursion; no wheezes, rales, or rhonchi  GI: Normoactive bowel sounds; nontender, nondistended  Ext: Feet cool to touch L>R; unable to appreciated peripheral pulses due to anasarca    Consultant(s) Notes Reviewed:  [x ] YES  [ ] NO  Care Discussed with Consultants/Other Providers [ x] YES  [ ] NO    LABS:                        9.5    8.36  )-----------( 135      ( 31 Jul 2024 12:34 )             30.8     07-31    147<H>  |  108  |  28<H>  ----------------------------<  140<H>  4.4   |  25  |  0.91    Ca    7.9<L>      31 Jul 2024 12:34  Phos  3.0     07-31  Mg     1.8     07-31    TPro  5.1<L>  /  Alb  2.8<L>  /  TBili  0.9  /  DBili  x   /  AST  42<H>  /  ALT  28  /  AlkPhos  185<H>  07-31    PT/INR - ( 31 Jul 2024 00:25 )   PT: 12.4 sec;   INR: 1.19 ratio         PTT - ( 31 Jul 2024 00:25 )  PTT:24.9 sec  Urinalysis Basic - ( 31 Jul 2024 12:34 )    Color: x / Appearance: x / SG: x / pH: x  Gluc: 140 mg/dL / Ketone: x  / Bili: x / Urobili: x   Blood: x / Protein: x / Nitrite: x   Leuk Esterase: x / RBC: x / WBC x   Sq Epi: x / Non Sq Epi: x / Bacteria: x      CAPILLARY BLOOD GLUCOSE      POCT Blood Glucose.: 131 mg/dL (31 Jul 2024 12:15)  POCT Blood Glucose.: 124 mg/dL (31 Jul 2024 05:26)  POCT Blood Glucose.: 125 mg/dL (30 Jul 2024 18:31)      ABG - ( 31 Jul 2024 15:55 )  pH, Arterial: 7.46  pH, Blood: x     /  pCO2: 34    /  pO2: 83    / HCO3: 24    / Base Excess: 0.5   /  SaO2: 99.2              Urinalysis Basic - ( 31 Jul 2024 12:34 )    Color: x / Appearance: x / SG: x / pH: x  Gluc: 140 mg/dL / Ketone: x  / Bili: x / Urobili: x   Blood: x / Protein: x / Nitrite: x   Leuk Esterase: x / RBC: x / WBC x   Sq Epi: x / Non Sq Epi: x / Bacteria: x        RADIOLOGY & ADDITIONAL TESTS:    Imaging Personally Reviewed:  [x ] YES  [ ] NO    ASSESSMENT:  80 yo male with metastatic neuroendocrine pancreatic cancer (tx on hold) and PMH of CAD s/p PCI x3 with most recent stent 6/12/24 on Plavix and eliquis , chronic Afib on eliquis, orthostatic hypotension on midodrine, PAD , recent admission for dx cath on 6/24/24 presented from PCP's office for hypotension despite taking AM midodrine dose on 7/2, admitted to medicine for symptomatic hypotension and RAZIA. Per chart, on 7/8 PM, pt was noted to have acute onset of melena x5 and coffee ground emesis x2-3. RRT was called on 7/9 AM for hypotension, hgb dropped from 9.2 to 7.4 (admission baseline 10-11), FOBT +, pt was started on PPI IV and transfused 1 unit of pRBC. GI was consulted and underwent EGD on 7/9 afternoon. MICU was consulted for uncontrolled bleeding during EGD. During EGD, pt became hypotensive and was given phenylephrine, had A-fib with RVR s/p amiodarone. Now s/p IR GDA embolization, admitted to MICU for further monitoring i.s.o hemorrhagic shock 2/2 GI bleed. On repeat EGD, oozing but no active bleeding was noted. Hospital course c/b c.diff, and patient has been placed on vancomycin. Patient had melanotic stool 7/18 with Hgb drop 10.1 to 7.1 and received 2 units pRBC and 1 unit platelets. Patient was reintubated and underwent EGD 7/18 afternoon. GI found duodenal bleed. Patient now s/p 3x clips and epi. Since 7/18 patient has continued to have melanotic stools with hemoglobin drops concerning for slow rebleeding. Currently, discussion revolves around the risks and benefits of antiplatelet therapy for recent stent vs risk of GI rebleed, and potential to undergo surgery.    - Total transfusions: 17 pRBC, 4 platelet, 5 FFP (as of 7/28)    Plan:     NEUROLOGIC  # Baseline AOx3 (waxing/waning)  Course:  - intubated 7/9, extubated 7/15  - reintubated 7/18 for EGD, extubated 7/22  -reintubated 7/23, extubated  - not sedated  - Currently AOx2-3  Plan:  - Delirium precautions    CARDIOVASCULAR  # hemorrhagic shock (worsened)  Course:  - Per spouse, pt's baseline BP is a little bit over 100  - 7/9: RRT called 7/9 for hypotension; 2/2 to Upper GI bleeds, urgently took to EGD, found bleeding ulcer that was not well controlled, underwent IR GDA embolization  - 7/18, patient had large melanotic stool with Hgb drop from 10.1-7.1, patient was restarted on henny, vaso, and levo for reintubation for GI scope; s/p 2pRBC, 1 platelet  - Total transfusions: 16 pRBC, 4 platelet, 5 FFP  Findings:  - AM cortisol 17 (7/5), AM cortisol 37.1 (7/11)  - POCUS ECHO (7/16) showed no cardiogenic shock, no tamponade, low SV indeterminate IVC, irregular B lines but not concerning for pulmonary edema, and some subdiaphragmatic fluid  Plan:  - Continue to monitor CBC, transfuse for Hgb <7  - Continue to hold Plavix 75 mg  - Off pressors    #CAD s/p PCI x3, most recent stent 6/12/24, NURIA to pLAD (stable)  Course:  - 7/13 restarted plavix for new stent (6/12/24) per GI, 7/18 held plavix due to bleeding  - 7/22 started low-dose aspirin as safer alternative to plavix per cardiology, but d/c 7/23 as patient bled again  Plan:  - Continue to hold Plavix 75 mg and aspirin 81 mg      #PAD  # A-fib on eliquis (worsening)  May have been worsened because of GI bleed  - s/p successful DCCV 10/31   - 7/16 In RVR, 2 doses of amio overnight; Cards recommended resuming home dose sotalol 40 mg PO (qTC wnl), s/p 1 dose sotalol but RVR persisted  - 7/16 Started amio loading  - 7/22 Added digoxin load for persistent HR 130s over past week  - 7/23 Dig level 1.3, patient's HR back in 60s in the afternoon  - 7/28 Patient pulled NG tube, unable to get several doses of amio, back in afib with RVR  - 7/30 NGT replaced - was pulled out later in the PM  Plan:  - Hold eliquis   - Hold home sotalol  - Cardiology following, advises against chemical cardioversion off AC if possible  - digoxin lvl 1.3  - c/w digoxin 62.5 MICROgrams IV push daily   - Repeat digoxin level on 8/3    #NSVT (stable)  Likely 2/2 cardiac structural damage given prior stents as well as electrolyte abnormalities while being diuresed. Troponins elevated but stable today. Troponins also elevated earlier in admission. EKG without ST elevations.  Plan:  - Continue to monitor  - Replete electrolytes as necessary    PULMONARY  #Intubated for airway protection prior to EGD/IR embolization (resolved)  Course:  - Extubated 7/22, SpO2 100% on RA, reintubated 7/27, Extubated 7/28    RENAL/  #RAZIA (resolved)  - Cr peaked at 3.38 (7/11)  - Currently Cr 1.13 ( 7/24)  Plan:  - Continue to monitor Cr    #ATN i.s.o hypotension  #metabolic acidosis (improving)  Patient was in a state of metabolic acidosis around 7/16, but recent ABG pH in normal range. However, patient has been bicarb deficient throughout the admission.   Plan:  - f/u nephro recs  - hold sodium bicarb 650mg tid considering improvement in hypernatremia and bicarb lvls  - Per nephro, patient is not a dialysis candidate  - trend BUN/Cr   - monitor Is and Os     #Hypernatremia (improving)  Patient having a gradually increasing sodium since transfer to the MICU. Na was improving by adding free water, lowering the bicarb drip, and diuresis with bumex/metolazone. Now downtrending, will hold diuresis.  Plan:  - stopped free water 300mL q6  - hold sodium bicarb 650mg TID given improvement  - hold bumex 1mg q12 IV, metolazone 5mg PO QD    #Urinary retention (improving)  Plan:  - Started Doxazosin 2 mg daily (7/15)  - Monitor I/Os  - Tavarez placed by urology 7/19, making urine  - Removed tavarez 7/30    GASTROINTESTINAL  #Upper GI Bleed (stable)  Course:  - 7/9 CT A/P - Active bleeding in the third portion of the duodenum. Progression of liver metastases.  - 7/9 EGD showed esophagitis, One non-bleeding duodenal ulcer with a clean ulcer base (Arjun Class III). One oozing duodenal ulcer with spurting hemorrhage (Arjun Class Ia). Treatment not successful. Treated with bipolar cautery.  - 7/9 s/p IR GDA embolization  - 7/18 Patient had melanotic stool with hemoglobin drop 10.1 to 7.1; EGD showing duodenal bleed s/p 3 clips + epi  - CTA (7/20) showed no active bleed  - H. pylori negative  - Total EGDs: 3  Plan:  - Continue pantoprazole 40 mg IV q12  - monitor Hgb, transfuse as needed for Hgb <7  - continue to f/u with GI and IR     - Per GI, "Will need PPI BID for 8 weeks for grade D esophagitis and PUD"    - Per GI, high risk of 30 day rebleed (>10-20% risk)  - No acute IR intervention per IR because no active area of extravasation on imaging (7/21)  - No acute GI intervention per GI (7/21)  - Surgery evaluated patient 7/23, may be a candidate for duodenotomy to oversew ulcers, but patient cannot be on aspirin  - Reached out to GI (7/26) about longer-term goals including risk of not being on aspirin vs bleeding;  Per GI, still a risks vs benefits situation as he may bleed again with ASA, patient may require a surgical intervention next  - As per heme/onc, c/w octreotide 250mg IV TID    #Nutrition  Course:  - FEES (7/17) failed  - ENT consulted for vallecular lesion, diagnosed vallecular cyst, no inpatient intervention required, outpatient f/u  - Patient currently extubated (7/24)  - FEES failed 7/29; patient refused evaluation  - NGT replaced (7/30)- was given 1mg ativan, nasal lidocaine gel, affrin prior to NGT insertion  - Speech/swallow today 8/1    Plan:  - No plan to replace, update wife, discuss GOC    #Diarrhea (resolved)  Course:  - c. diff + (7/14)  - vancomycin (7/14 -7/23) active infection dose, vancomycin prophylaxis (7/24-7/25)   Plan:  - No longer on vancomycin  - Infection prevention says contact precautions continued due to mucoid BMs    #Transaminitis (worsened)  Findings:  - Bili 1.1 (7/16) -> 1.3 (7/17) ->0.5 (7/24) -> 0.6 (7/28)  - Alk P 359 (7/16)-> 484 (7/17) -> 174 (7/24) -> 229 (7/28)  -  (7/16)->129 (7/17) -> 75 (7/24) -> 65 (7/28)  - ALT 67 (7/16)-> 72 (7/17) -> 30 (7/24) -> 229 (7/28)  - RUQ US (7/17): known liver mets, no acute findings  Plan:  - Continue atorvastatin 80 mg daily    INFECTIOUS DISEASE  #leukocytosis (resolved)  Cultures:  - Blood cultures negative throughout admission  - Sputum Cx from ETT 7/13 showed numerous gram neg krishna (Klebsiella oxytoca/raoultella ornithinolytica)  - GI PCR negative  - MRSA swab negative  - 7/29 sputum culture growing P aeruginosa   Antibiotics Course:  - Vancomycin (7/14 - 7/23), Vancomycin Prophylaxis (7/24 -7/25)  - Zosyn (7/11-7/18), resistance shown, switched to meropenem  - Meropenem (7/18-7/25)  Plan:  - No longer on antibiotics  - Continue to monitor for fevers  - If febrile start abx for pseudomonas    ENDOCRINE  #adrenal insufficiency (stable)  Findings:  - 7/5 cortisol 17   - 7/11 cortisol 37.1   Plan:  - Taper hydrocortisone 25mg QD for 1 days  - Monitor sugars with steroid    HEMATOLOGY/ONCOLOGY   # neuroendocrine tumor (unchanged)  - was on lanreotide then had POD in liver and started on peptide receptor radiotherapy (PRRT)- typically given every 8 weeks for 4 doses. He last received it 10/20/2023   - PET 5/28/24 shows new somatostatin avid osseous lesions; decreased intensely avid right supradiaphragmatic and upper abdominal nodes, suspicious for mets  - Gastrin level 5509  Plan:  - per heme/onc:     - can check factor levels V, VIII, X     - "f/u with Dr. Sophia Prasad at [facility] [place] after discharge, no plan for treatment inpatient, if clinically improves/stabilizes then can be considered for treatment outpatient"     - c/w octreotide 250 mcg TID    #Normocytic anemia (stable)  - Pt with low Hgb 7-9  - ISO of GI bleed hx and continued melanotic BMs, there is c/o of rebleed; per GI, high risk of rebleeding, currently seems to be slow bleeding   Plan:  - Surgery and GI made aware & are following   - Continue CBC q12 unless having large melanotic stools    #DVT ppx (unchanged)  - SCDs  - LE duplex (7/17): negative for DVT    SKINS:   - 2x peripheral IV right arm    Ethics:   - Full Code   - GOC 7/12, spoke to spouse (Yamilet) over phone with palliative care team, discussed GOC again on 7/16  - 7/29 - Patient expressed that he wants to die, refuses to see psych, equivocating on DNR status.   - Pulled NGT for 3rd time on 7/30  - Continue GOC discussion.    Consults this admission: GI, Heme Onc, Palliative, Endocrinology, Cardiology, Nephrology, Interventional Radiology, General Surgery MICU DOWN GRADE NOTE    Transfer from: MICU  Transfer to: (  ) Medicine    ( X ) Telemetry     (   ) RCU        (    ) Palliative         (   ) Stroke Unit        Accepting Physician: Dr. Keith Willis  Team (MAR) or ACP service:   Signout given to:       Patient is a 79y old  Male who presents with a chief complaint of hypotension (31 Jul 2024 10:31)      HPI:   80 yo male with metastatic neuroendocrine pancreatic cancer (tx on hold) and PMH of CAD s/p PCI x3 with most recent stent 6/12/24 on Plavix and Eliquis, chronic Afib on Eliquis, orthostatic hypotension on midodrine, PAD, recent admission for dx cath on 6/24/24 presented from PCP's office for hypotension despite taking AM midodrine dose on 7/2, admitted to medicine for symptomatic hypotension and RAZIA. Per chart, on 7/8 PM, pt was noted to have acute onset of melena x5 and coffee ground emesis x2-3. RRT was called on 7/9 AM for hypotension, hgb dropped from 9.2 to 7.4 (admission baseline 10-11), FOBT +, pt was started on PPI IV and transfused 1 unit of pRBC. GI was consulted and underwent EGD on 7/9 afternoon. MICU was consulted for uncontrolled bleeding during EGD. During EGD, pt became hypotensive and was given phenylephrine, had A-fib with RVR s/p amiodarone. Now s/p IR GDA embolization, admitted to MICU for further monitoring i.s.o hemorrhagic shock 2/2 GI bleed. On repeat EGD, oozing but no active bleeding was noted. Hospital course c/b c.diff, and patient was started on vancomycin from 7/14-7/23; vancomycin was given prophylactically from 7/24-7/25. Patient had melanotic stool 7/18 with Hgb drop 10.1 to 7.1 and received 2 units pRBC and 1 unit platelets. Patient was reintubated and underwent EGD 7/18 afternoon. GI found duodenal bleed. Patient now s/p 3x clips and epi. Since 7/18 patient has continued to have melanotic stools with hemoglobin drops concerning for slow rebleeding. Currently, discussion revolves around the risks and benefits of antiplatelet therapy for recent stent vs risk of GI rebleed, and potential to undergo surgery. Last blood transfusion was given on 7/28; pt's Hgb has been stable since then. On 7/11, pt began developing leukocytosis - was started on Zosyn. Sputum culture tested positive for CRE Klebsiella (7/13). Zosyn was then discontinued d/t resistance (7/18), and pt was started on meropenem from 7/18 to 7/25. On 7/28, pt self-removed NG tube. Pt was evaluated by speech and swallow team; however, failed evaluation. On 7/30, NGT was replaced, which pt had self-removed later on that same day. No plan to replace NGT for now. Pt was reevaluated by speech and swallow team on 8/1, who recommended barium swallow study.    - Total transfusions: 17 pRBC, 4 platelet, 5 FFP (as of 7/28)      TO-DOs:  [ ] f/u barium swallow study - speech and swallow recs  [ ] monitor Hgb - transfuse as needed  [ ] f/u GI recs regarding A/C recs  [ ] f/u cardio recs regarding Afib and post-stent management     REVIEW OF SYSTEMS:  CONSTITUTIONAL: No fever, chills  HEENT:  No blurry vision, No sinus or throat pain  NECK: No pain or stiffness  RESPIRATORY: Cough, no wheezing, chills or hemoptysis; No shortness of breath  CARDIOVASCULAR: No chest pain, palpitations  GASTROINTESTINAL: No abdominal pain. No nausea, vomiting, or diarrhea  GENITOURINARY: No dysuria  NEUROLOGICAL: No HA, No focal weakness  SKIN: No itching, burning, rashes, or lesions   MUSCULOSKELETAL: B/l LE swelling and pain    MEDICATIONS:  atorvastatin 80 milliGRAM(s) Oral at bedtime  chlorhexidine 2% Cloths 1 Application(s) Topical <User Schedule>  chlorhexidine 4% Liquid 1 Application(s) Topical <User Schedule>  digoxin     Tablet 125 MICROGram(s) Oral daily  doxazosin 2 milliGRAM(s) Oral at bedtime  folic acid Injectable 1 milliGRAM(s) IV Push daily  insulin lispro (ADMELOG) corrective regimen sliding scale   SubCutaneous every 6 hours  lactated ringers. 1000 milliLiter(s) IV Continuous <Continuous>  midodrine 10 milliGRAM(s) Oral every 8 hours  multivitamin 1 Tablet(s) Oral daily  octreotide  Injectable 250 MICROGram(s) IV Push three times a day  pantoprazole  Injectable 40 milliGRAM(s) IV Push every 12 hours  sodium chloride 0.9% lock flush 10 milliLiter(s) IV Push every 1 hour PRN  thiamine Injectable 100 milliGRAM(s) IV Push daily      T(C): 36.9 (07-31-24 @ 16:00), Max: 37.2 (07-30-24 @ 20:00)  HR: 115 (07-31-24 @ 16:00) (94 - 142)  BP: 102/60 (07-31-24 @ 16:00) (81/42 - 127/56)  RR: 21 (07-31-24 @ 16:00) (16 - 46)  SpO2: 99% (07-31-24 @ 16:00) (93% - 100%)  Wt(kg): --Vital Signs Last 24 Hrs  T(C): 36.9 (31 Jul 2024 16:00), Max: 37.2 (30 Jul 2024 20:00)  T(F): 98.4 (31 Jul 2024 16:00), Max: 99 (30 Jul 2024 20:00)  HR: 115 (31 Jul 2024 16:00) (94 - 142)  BP: 102/60 (31 Jul 2024 16:00) (81/42 - 127/56)  BP(mean): 76 (31 Jul 2024 16:00) (56 - 86)  RR: 21 (31 Jul 2024 16:00) (16 - 46)  SpO2: 99% (31 Jul 2024 16:00) (93% - 100%)    Parameters below as of 31 Jul 2024 08:00  Patient On (Oxygen Delivery Method): room air        PHYSICAL EXAM:  General: Elderly man lying in bed, awake and alert  Cadio: Irregularly irregular rhythm; normal S1/S2; no murmurs, rubs, or gallops  Neuro: Moves extremities, follows commands; Left toe movement decreased compared to right; sensation to light touch intact b/l LE  Pulm: Clear to auscultation bilaterally; intermittent rhonchorous sounds transmitted from upper airways; normal symmetric excursion; no wheezes, rales, or rhonchi  GI: Normoactive bowel sounds; nontender, nondistended  Ext: Feet cool to touch L>R; unable to appreciated peripheral pulses due to anasarca    Consultant(s) Notes Reviewed:  [x ] YES  [ ] NO  Care Discussed with Consultants/Other Providers [ x] YES  [ ] NO    LABS:                        9.5    8.36  )-----------( 135      ( 31 Jul 2024 12:34 )             30.8     07-31    147<H>  |  108  |  28<H>  ----------------------------<  140<H>  4.4   |  25  |  0.91    Ca    7.9<L>      31 Jul 2024 12:34  Phos  3.0     07-31  Mg     1.8     07-31    TPro  5.1<L>  /  Alb  2.8<L>  /  TBili  0.9  /  DBili  x   /  AST  42<H>  /  ALT  28  /  AlkPhos  185<H>  07-31    PT/INR - ( 31 Jul 2024 00:25 )   PT: 12.4 sec;   INR: 1.19 ratio         PTT - ( 31 Jul 2024 00:25 )  PTT:24.9 sec  Urinalysis Basic - ( 31 Jul 2024 12:34 )    Color: x / Appearance: x / SG: x / pH: x  Gluc: 140 mg/dL / Ketone: x  / Bili: x / Urobili: x   Blood: x / Protein: x / Nitrite: x   Leuk Esterase: x / RBC: x / WBC x   Sq Epi: x / Non Sq Epi: x / Bacteria: x      CAPILLARY BLOOD GLUCOSE      POCT Blood Glucose.: 131 mg/dL (31 Jul 2024 12:15)  POCT Blood Glucose.: 124 mg/dL (31 Jul 2024 05:26)  POCT Blood Glucose.: 125 mg/dL (30 Jul 2024 18:31)      ABG - ( 31 Jul 2024 15:55 )  pH, Arterial: 7.46  pH, Blood: x     /  pCO2: 34    /  pO2: 83    / HCO3: 24    / Base Excess: 0.5   /  SaO2: 99.2              Urinalysis Basic - ( 31 Jul 2024 12:34 )    Color: x / Appearance: x / SG: x / pH: x  Gluc: 140 mg/dL / Ketone: x  / Bili: x / Urobili: x   Blood: x / Protein: x / Nitrite: x   Leuk Esterase: x / RBC: x / WBC x   Sq Epi: x / Non Sq Epi: x / Bacteria: x        RADIOLOGY & ADDITIONAL TESTS:    Imaging Personally Reviewed:  [x ] YES  [ ] NO    ASSESSMENT:  80 yo male with metastatic neuroendocrine pancreatic cancer (tx on hold) and PMH of CAD s/p PCI x3 with most recent stent 6/12/24 on Plavix and eliquis , chronic Afib on eliquis, orthostatic hypotension on midodrine, PAD , recent admission for dx cath on 6/24/24 presented from PCP's office for hypotension despite taking AM midodrine dose on 7/2, admitted to medicine for symptomatic hypotension and RAZIA. Per chart, on 7/8 PM, pt was noted to have acute onset of melena x5 and coffee ground emesis x2-3. RRT was called on 7/9 AM for hypotension, hgb dropped from 9.2 to 7.4 (admission baseline 10-11), FOBT +, pt was started on PPI IV and transfused 1 unit of pRBC. GI was consulted and underwent EGD on 7/9 afternoon. MICU was consulted for uncontrolled bleeding during EGD. During EGD, pt became hypotensive and was given phenylephrine, had A-fib with RVR s/p amiodarone. Now s/p IR GDA embolization, admitted to MICU for further monitoring i.s.o hemorrhagic shock 2/2 GI bleed. On repeat EGD, oozing but no active bleeding was noted. Hospital course c/b c.diff, and patient has been placed on vancomycin. Patient had melanotic stool 7/18 with Hgb drop 10.1 to 7.1 and received 2 units pRBC and 1 unit platelets. Patient was reintubated and underwent EGD 7/18 afternoon. GI found duodenal bleed. Patient now s/p 3x clips and epi. Since 7/18 patient has continued to have melanotic stools with hemoglobin drops concerning for slow rebleeding. Currently, discussion revolves around the risks and benefits of antiplatelet therapy for recent stent vs risk of GI rebleed, and potential to undergo surgery.    - Total transfusions: 17 pRBC, 4 platelet, 5 FFP (as of 7/28)    Plan:     NEUROLOGIC  # Baseline AOx3 (waxing/waning)  Course:  - intubated 7/9, extubated 7/15  - reintubated 7/18 for EGD, extubated 7/22  -reintubated 7/23, extubated  - not sedated  - Currently AOx2-3  Plan:  - Delirium precautions    CARDIOVASCULAR  # hemorrhagic shock (worsened)  Course:  - Per spouse, pt's baseline BP is a little bit over 100  - 7/9: RRT called 7/9 for hypotension; 2/2 to Upper GI bleeds, urgently took to EGD, found bleeding ulcer that was not well controlled, underwent IR GDA embolization  - 7/18, patient had large melanotic stool with Hgb drop from 10.1-7.1, patient was restarted on henny, vaso, and levo for reintubation for GI scope; s/p 2pRBC, 1 platelet  - Total transfusions: 16 pRBC, 4 platelet, 5 FFP  Findings:  - AM cortisol 17 (7/5), AM cortisol 37.1 (7/11)  - POCUS ECHO (7/16) showed no cardiogenic shock, no tamponade, low SV indeterminate IVC, irregular B lines but not concerning for pulmonary edema, and some subdiaphragmatic fluid  Plan:  - Continue to monitor CBC, transfuse for Hgb <7  - Continue to hold Plavix 75 mg  - Off pressors    #CAD s/p PCI x3, most recent stent 6/12/24, NURIA to pLAD (stable)  Course:  - 7/13 restarted plavix for new stent (6/12/24) per GI, 7/18 held plavix due to bleeding  - 7/22 started low-dose aspirin as safer alternative to plavix per cardiology, but d/c 7/23 as patient bled again  Plan:  - Continue to hold Plavix 75 mg and aspirin 81 mg      #PAD  # A-fib on eliquis (worsening)  May have been worsened because of GI bleed  - s/p successful DCCV 10/31   - 7/16 In RVR, 2 doses of amio overnight; Cards recommended resuming home dose sotalol 40 mg PO (qTC wnl), s/p 1 dose sotalol but RVR persisted  - 7/16 Started amio loading  - 7/22 Added digoxin load for persistent HR 130s over past week  - 7/23 Dig level 1.3, patient's HR back in 60s in the afternoon  - 7/28 Patient pulled NG tube, unable to get several doses of amio, back in afib with RVR  - 7/30 NGT replaced - was pulled out later in the PM  Plan:  - Hold eliquis   - Hold home sotalol  - Cardiology following, advises against chemical cardioversion off AC if possible  - digoxin lvl 1.3  - c/w digoxin 62.5 MICROgrams IV push daily   - Repeat digoxin level on 8/3    #NSVT (stable)  Likely 2/2 cardiac structural damage given prior stents as well as electrolyte abnormalities while being diuresed. Troponins elevated but stable today. Troponins also elevated earlier in admission. EKG without ST elevations.  Plan:  - Continue to monitor  - Replete electrolytes as necessary    PULMONARY  #Intubated for airway protection prior to EGD/IR embolization (resolved)  Course:  - Extubated 7/22, SpO2 100% on RA, reintubated 7/27, Extubated 7/28    RENAL/  #RAZIA (resolved)  - Cr peaked at 3.38 (7/11)  - Currently Cr 1.13 ( 7/24)  Plan:  - Continue to monitor Cr    #ATN i.s.o hypotension  #metabolic acidosis (improving)  Patient was in a state of metabolic acidosis around 7/16, but recent ABG pH in normal range. However, patient has been bicarb deficient throughout the admission.   Plan:  - f/u nephro recs  - hold sodium bicarb 650mg tid considering improvement in hypernatremia and bicarb lvls  - Per nephro, patient is not a dialysis candidate  - trend BUN/Cr   - monitor Is and Os     #Hypernatremia (improving)  Patient having a gradually increasing sodium since transfer to the MICU. Na was improving by adding free water, lowering the bicarb drip, and diuresis with bumex/metolazone. Now downtrending, will hold diuresis.  Plan:  - stopped free water 300mL q6  - hold sodium bicarb 650mg TID given improvement  - hold bumex 1mg q12 IV, metolazone 5mg PO QD    #Urinary retention (improving)  Plan:  - Started Doxazosin 2 mg daily (7/15)  - Monitor I/Os  - Tavarez placed by urology 7/19, making urine  - Removed tavarez 7/30    GASTROINTESTINAL  #Upper GI Bleed (stable)  Course:  - 7/9 CT A/P - Active bleeding in the third portion of the duodenum. Progression of liver metastases.  - 7/9 EGD showed esophagitis, One non-bleeding duodenal ulcer with a clean ulcer base (Arjun Class III). One oozing duodenal ulcer with spurting hemorrhage (Arjun Class Ia). Treatment not successful. Treated with bipolar cautery.  - 7/9 s/p IR GDA embolization  - 7/18 Patient had melanotic stool with hemoglobin drop 10.1 to 7.1; EGD showing duodenal bleed s/p 3 clips + epi  - CTA (7/20) showed no active bleed  - H. pylori negative  - Total EGDs: 3  Plan:  - Continue pantoprazole 40 mg IV q12  - monitor Hgb, transfuse as needed for Hgb <7  - continue to f/u with GI and IR     - Per GI, "Will need PPI BID for 8 weeks for grade D esophagitis and PUD"    - Per GI, high risk of 30 day rebleed (>10-20% risk)  - No acute IR intervention per IR because no active area of extravasation on imaging (7/21)  - No acute GI intervention per GI (7/21)  - Surgery evaluated patient 7/23, may be a candidate for duodenotomy to oversew ulcers, but patient cannot be on aspirin  - Reached out to GI (7/26) about longer-term goals including risk of not being on aspirin vs bleeding;  Per GI, still a risks vs benefits situation as he may bleed again with ASA, patient may require a surgical intervention next  - As per heme/onc, c/w octreotide 250mg IV TID    #Nutrition  Course:  - FEES (7/17) failed  - ENT consulted for vallecular lesion, diagnosed vallecular cyst, no inpatient intervention required, outpatient f/u  - Patient currently extubated (7/24)  - FEES failed 7/29; patient refused evaluation  - NGT replaced (7/30)- was given 1mg ativan, nasal lidocaine gel, affrin prior to NGT insertion  - Speech/swallow today 8/1    Plan:  - No plan to replace, update wife, discuss GOC    #Diarrhea (resolved)  Course:  - c. diff + (7/14)  - vancomycin (7/14 -7/23) active infection dose, vancomycin prophylaxis (7/24-7/25)   Plan:  - No longer on vancomycin  - Infection prevention says contact precautions continued due to mucoid BMs    #Transaminitis (worsened)  Findings:  - Bili 1.1 (7/16) -> 1.3 (7/17) ->0.5 (7/24) -> 0.6 (7/28)  - Alk P 359 (7/16)-> 484 (7/17) -> 174 (7/24) -> 229 (7/28)  -  (7/16)->129 (7/17) -> 75 (7/24) -> 65 (7/28)  - ALT 67 (7/16)-> 72 (7/17) -> 30 (7/24) -> 229 (7/28)  - RUQ US (7/17): known liver mets, no acute findings  Plan:  - Continue atorvastatin 80 mg daily    INFECTIOUS DISEASE  #leukocytosis (resolved)  Cultures:  - Blood cultures negative throughout admission  - Sputum Cx from ETT 7/13 showed numerous gram neg krishna (Klebsiella oxytoca/raoultella ornithinolytica)  - GI PCR negative  - MRSA swab negative  - 7/29 sputum culture growing P aeruginosa   Antibiotics Course:  - Vancomycin (7/14 - 7/23), Vancomycin Prophylaxis (7/24 -7/25)  - Zosyn (7/11-7/18), resistance shown, switched to meropenem  - Meropenem (7/18-7/25)  Plan:  - No longer on antibiotics  - Continue to monitor for fevers  - If febrile start abx for pseudomonas    ENDOCRINE  #adrenal insufficiency (stable)  Findings:  - 7/5 cortisol 17   - 7/11 cortisol 37.1   Plan:  - Taper hydrocortisone 25mg QD for 1 days  - Monitor sugars with steroid    HEMATOLOGY/ONCOLOGY   # neuroendocrine tumor (unchanged)  - was on lanreotide then had POD in liver and started on peptide receptor radiotherapy (PRRT)- typically given every 8 weeks for 4 doses. He last received it 10/20/2023   - PET 5/28/24 shows new somatostatin avid osseous lesions; decreased intensely avid right supradiaphragmatic and upper abdominal nodes, suspicious for mets  - Gastrin level 5509  Plan:  - per heme/onc:     - can check factor levels V, VIII, X     - "f/u with Dr. Sophia Prasad at [facility] after discharge, no plan for treatment inpatient, if clinically improves/stabilizes then can be considered for treatment outpatient"     - c/w octreotide 250 mcg TID    #Normocytic anemia (stable)  - Pt with low Hgb 7-9  - ISO of GI bleed hx and continued melanotic BMs, there is c/o of rebleed; per GI, high risk of rebleeding, currently seems to be slow bleeding   Plan:  - Surgery and GI made aware & are following   - Continue CBC q12 unless having large melanotic stools    #DVT ppx (unchanged)  - SCDs  - LE duplex (7/17): negative for DVT    SKINS:   - 2x peripheral IV right arm    Ethics:   - Full Code   - GOC 7/12, spoke to spouse (Yamilet) over phone with palliative care team, discussed GOC again on 7/16  - 7/29 - Patient expressed that he wants to die, refuses to see psych, equivocating on DNR status.   - Pulled NGT for 3rd time on 7/30  - Continue GOC discussion.    Consults this admission: GI, Heme Onc, Palliative, Endocrinology, Cardiology, Nephrology, Interventional Radiology, General Surgery MICU DOWN GRADE NOTE    Transfer from: MICU  Transfer to: (  ) Medicine    ( X ) Telemetry     (   ) RCU        (    ) Palliative         (   ) Stroke Unit        Accepting Physician: Dr. Keith Willis  Team (MAR) or ACP service:   Signout given to:       Patient is a 79y old  Male who presents with a chief complaint of hypotension (31 Jul 2024 10:31)      HPI:   80 yo male with metastatic neuroendocrine pancreatic cancer (tx on hold) and PMH of CAD s/p PCI x3 with most recent stent 6/12/24 on Plavix and Eliquis, chronic Afib on Eliquis, orthostatic hypotension on midodrine, PAD, recent admission for dx cath on 6/24/24 presented from PCP's office for hypotension despite taking AM midodrine dose on 7/2, admitted to medicine for symptomatic hypotension and RAZIA. Per chart, on 7/8 PM, pt was noted to have acute onset of melena x5 and coffee ground emesis x2-3. RRT was called on 7/9 AM for hypotension, hgb dropped from 9.2 to 7.4 (admission baseline 10-11), FOBT +, pt was started on PPI IV and transfused 1 unit of pRBC. GI was consulted and underwent EGD on 7/9 afternoon. MICU was consulted for uncontrolled bleeding during EGD. During EGD, pt became hypotensive and was given phenylephrine, had A-fib with RVR s/p amiodarone. Now s/p IR GDA embolization, admitted to MICU for further monitoring i.s.o hemorrhagic shock 2/2 GI bleed. On repeat EGD, oozing but no active bleeding was noted. Hospital course c/b c.diff, and patient was started on vancomycin from 7/14-7/23; vancomycin was given prophylactically from 7/24-7/25. Patient had melanotic stool 7/18 with Hgb drop 10.1 to 7.1 and received 2 units pRBC and 1 unit platelets. Patient was reintubated and underwent EGD 7/18 afternoon. GI found duodenal bleed. Patient now s/p 3x clips and epi. Since 7/18 patient has continued to have melanotic stools with hemoglobin drops concerning for slow rebleeding. Currently, discussion revolves around the risks and benefits of antiplatelet therapy for recent stent vs risk of GI rebleed, and potential to undergo surgery. Last blood transfusion was given on 7/28; pt's Hgb has been stable since then. On 7/11, pt began developing leukocytosis - was started on Zosyn. Sputum culture tested positive for CRE Klebsiella (7/13). Zosyn was then discontinued d/t resistance (7/18), and pt was started on meropenem from 7/18 to 7/25. On 7/28, pt self-removed NG tube. Pt was evaluated by speech and swallow team; however, failed evaluation. On 7/30, NGT was replaced, which pt had self-removed later on that same day. No plan to replace NGT for now. Pt was reevaluated by speech and swallow team on 8/1, who recommended barium swallow study.    - Total transfusions: 17 pRBC, 4 platelet, 5 FFP (as of 7/28)      TO-DOs:  [ ] f/u barium swallow study - speech and swallow recs  [ ] monitor Hgb - transfuse as needed  [ ] f/u GI recs regarding A/C recs  [ ] f/u cardio recs regarding Afib and post-stent management   [ ] f/u heme/onc recs regarding metastatic neuroendocrine pancreatic cancer    REVIEW OF SYSTEMS:  CONSTITUTIONAL: No fever, chills  HEENT:  No blurry vision, No sinus or throat pain  NECK: No pain or stiffness  RESPIRATORY: Cough, no wheezing, chills or hemoptysis; No shortness of breath  CARDIOVASCULAR: No chest pain, palpitations  GASTROINTESTINAL: No abdominal pain. No nausea, vomiting, or diarrhea  GENITOURINARY: No dysuria  NEUROLOGICAL: No HA, No focal weakness  SKIN: No itching, burning, rashes, or lesions   MUSCULOSKELETAL: B/l LE swelling and pain    MEDICATIONS:  atorvastatin 80 milliGRAM(s) Oral at bedtime  chlorhexidine 2% Cloths 1 Application(s) Topical <User Schedule>  chlorhexidine 4% Liquid 1 Application(s) Topical <User Schedule>  digoxin     Tablet 125 MICROGram(s) Oral daily  doxazosin 2 milliGRAM(s) Oral at bedtime  folic acid Injectable 1 milliGRAM(s) IV Push daily  insulin lispro (ADMELOG) corrective regimen sliding scale   SubCutaneous every 6 hours  lactated ringers. 1000 milliLiter(s) IV Continuous <Continuous>  midodrine 10 milliGRAM(s) Oral every 8 hours  multivitamin 1 Tablet(s) Oral daily  octreotide  Injectable 250 MICROGram(s) IV Push three times a day  pantoprazole  Injectable 40 milliGRAM(s) IV Push every 12 hours  sodium chloride 0.9% lock flush 10 milliLiter(s) IV Push every 1 hour PRN  thiamine Injectable 100 milliGRAM(s) IV Push daily      T(C): 36.9 (07-31-24 @ 16:00), Max: 37.2 (07-30-24 @ 20:00)  HR: 115 (07-31-24 @ 16:00) (94 - 142)  BP: 102/60 (07-31-24 @ 16:00) (81/42 - 127/56)  RR: 21 (07-31-24 @ 16:00) (16 - 46)  SpO2: 99% (07-31-24 @ 16:00) (93% - 100%)  Wt(kg): --Vital Signs Last 24 Hrs  T(C): 36.9 (31 Jul 2024 16:00), Max: 37.2 (30 Jul 2024 20:00)  T(F): 98.4 (31 Jul 2024 16:00), Max: 99 (30 Jul 2024 20:00)  HR: 115 (31 Jul 2024 16:00) (94 - 142)  BP: 102/60 (31 Jul 2024 16:00) (81/42 - 127/56)  BP(mean): 76 (31 Jul 2024 16:00) (56 - 86)  RR: 21 (31 Jul 2024 16:00) (16 - 46)  SpO2: 99% (31 Jul 2024 16:00) (93% - 100%)    Parameters below as of 31 Jul 2024 08:00  Patient On (Oxygen Delivery Method): room air        PHYSICAL EXAM:  General: Elderly man lying in bed, awake and alert  Cadio: Irregularly irregular rhythm; normal S1/S2; no murmurs, rubs, or gallops  Neuro: Moves extremities, follows commands; Left toe movement decreased compared to right; sensation to light touch intact b/l LE  Pulm: Clear to auscultation bilaterally; intermittent rhonchorous sounds transmitted from upper airways; normal symmetric excursion; no wheezes, rales, or rhonchi  GI: Normoactive bowel sounds; nontender, nondistended  Ext: Feet cool to touch L>R; unable to appreciated peripheral pulses due to anasarca    Consultant(s) Notes Reviewed:  [x ] YES  [ ] NO  Care Discussed with Consultants/Other Providers [ x] YES  [ ] NO    LABS:                        9.5    8.36  )-----------( 135      ( 31 Jul 2024 12:34 )             30.8     07-31    147<H>  |  108  |  28<H>  ----------------------------<  140<H>  4.4   |  25  |  0.91    Ca    7.9<L>      31 Jul 2024 12:34  Phos  3.0     07-31  Mg     1.8     07-31    TPro  5.1<L>  /  Alb  2.8<L>  /  TBili  0.9  /  DBili  x   /  AST  42<H>  /  ALT  28  /  AlkPhos  185<H>  07-31    PT/INR - ( 31 Jul 2024 00:25 )   PT: 12.4 sec;   INR: 1.19 ratio         PTT - ( 31 Jul 2024 00:25 )  PTT:24.9 sec  Urinalysis Basic - ( 31 Jul 2024 12:34 )    Color: x / Appearance: x / SG: x / pH: x  Gluc: 140 mg/dL / Ketone: x  / Bili: x / Urobili: x   Blood: x / Protein: x / Nitrite: x   Leuk Esterase: x / RBC: x / WBC x   Sq Epi: x / Non Sq Epi: x / Bacteria: x      CAPILLARY BLOOD GLUCOSE      POCT Blood Glucose.: 131 mg/dL (31 Jul 2024 12:15)  POCT Blood Glucose.: 124 mg/dL (31 Jul 2024 05:26)  POCT Blood Glucose.: 125 mg/dL (30 Jul 2024 18:31)      ABG - ( 31 Jul 2024 15:55 )  pH, Arterial: 7.46  pH, Blood: x     /  pCO2: 34    /  pO2: 83    / HCO3: 24    / Base Excess: 0.5   /  SaO2: 99.2              Urinalysis Basic - ( 31 Jul 2024 12:34 )    Color: x / Appearance: x / SG: x / pH: x  Gluc: 140 mg/dL / Ketone: x  / Bili: x / Urobili: x   Blood: x / Protein: x / Nitrite: x   Leuk Esterase: x / RBC: x / WBC x   Sq Epi: x / Non Sq Epi: x / Bacteria: x        RADIOLOGY & ADDITIONAL TESTS:    Imaging Personally Reviewed:  [x ] YES  [ ] NO    ASSESSMENT:  80 yo male with metastatic neuroendocrine pancreatic cancer (tx on hold) and PMH of CAD s/p PCI x3 with most recent stent 6/12/24 on Plavix and eliquis , chronic Afib on eliquis, orthostatic hypotension on midodrine, PAD , recent admission for dx cath on 6/24/24 presented from PCP's office for hypotension despite taking AM midodrine dose on 7/2, admitted to medicine for symptomatic hypotension and RAZIA. Per chart, on 7/8 PM, pt was noted to have acute onset of melena x5 and coffee ground emesis x2-3. RRT was called on 7/9 AM for hypotension, hgb dropped from 9.2 to 7.4 (admission baseline 10-11), FOBT +, pt was started on PPI IV and transfused 1 unit of pRBC. GI was consulted and underwent EGD on 7/9 afternoon. MICU was consulted for uncontrolled bleeding during EGD. During EGD, pt became hypotensive and was given phenylephrine, had A-fib with RVR s/p amiodarone. Now s/p IR GDA embolization, admitted to MICU for further monitoring i.s.o hemorrhagic shock 2/2 GI bleed. On repeat EGD, oozing but no active bleeding was noted. Hospital course c/b c.diff, and patient has been placed on vancomycin. Patient had melanotic stool 7/18 with Hgb drop 10.1 to 7.1 and received 2 units pRBC and 1 unit platelets. Patient was reintubated and underwent EGD 7/18 afternoon. GI found duodenal bleed. Patient now s/p 3x clips and epi. Since 7/18 patient has continued to have melanotic stools with hemoglobin drops concerning for slow rebleeding. Currently, discussion revolves around the risks and benefits of antiplatelet therapy for recent stent vs risk of GI rebleed, and potential to undergo surgery.    - Total transfusions: 17 pRBC, 4 platelet, 5 FFP (as of 7/28)    Plan:     NEUROLOGIC  # Baseline AOx3 (waxing/waning)  Course:  - intubated 7/9, extubated 7/15  - reintubated 7/18 for EGD, extubated 7/22  -reintubated 7/23, extubated  - not sedated  - Currently AOx2-3  Plan:  - Delirium precautions    CARDIOVASCULAR  # hemorrhagic shock (worsened)  Course:  - Per spouse, pt's baseline BP is a little bit over 100  - 7/9: RRT called 7/9 for hypotension; 2/2 to Upper GI bleeds, urgently took to EGD, found bleeding ulcer that was not well controlled, underwent IR GDA embolization  - 7/18, patient had large melanotic stool with Hgb drop from 10.1-7.1, patient was restarted on henny, vaso, and levo for reintubation for GI scope; s/p 2pRBC, 1 platelet  - Total transfusions: 16 pRBC, 4 platelet, 5 FFP  Findings:  - AM cortisol 17 (7/5), AM cortisol 37.1 (7/11)  - POCUS ECHO (7/16) showed no cardiogenic shock, no tamponade, low SV indeterminate IVC, irregular B lines but not concerning for pulmonary edema, and some subdiaphragmatic fluid  Plan:  - Continue to monitor CBC, transfuse for Hgb <7  - Continue to hold Plavix 75 mg  - Off pressors    #CAD s/p PCI x3, most recent stent 6/12/24, NURIA to pLAD (stable)  Course:  - 7/13 restarted plavix for new stent (6/12/24) per GI, 7/18 held plavix due to bleeding  - 7/22 started low-dose aspirin as safer alternative to plavix per cardiology, but d/c 7/23 as patient bled again  Plan:  - Continue to hold Plavix 75 mg and aspirin 81 mg      #PAD  # A-fib on eliquis (worsening)  May have been worsened because of GI bleed  - s/p successful DCCV 10/31   - 7/16 In RVR, 2 doses of amio overnight; Cards recommended resuming home dose sotalol 40 mg PO (qTC wnl), s/p 1 dose sotalol but RVR persisted  - 7/16 Started amio loading  - 7/22 Added digoxin load for persistent HR 130s over past week  - 7/23 Dig level 1.3, patient's HR back in 60s in the afternoon  - 7/28 Patient pulled NG tube, unable to get several doses of amio, back in afib with RVR  - 7/30 NGT replaced - was pulled out later in the PM  Plan:  - Hold eliquis   - Hold home sotalol  - Cardiology following, advises against chemical cardioversion off AC if possible  - digoxin lvl 1.3  - c/w digoxin 62.5 MICROgrams IV push daily   - Repeat digoxin level on 8/3    #NSVT (stable)  Likely 2/2 cardiac structural damage given prior stents as well as electrolyte abnormalities while being diuresed. Troponins elevated but stable today. Troponins also elevated earlier in admission. EKG without ST elevations.  Plan:  - Continue to monitor  - Replete electrolytes as necessary    PULMONARY  #Intubated for airway protection prior to EGD/IR embolization (resolved)  Course:  - Extubated 7/22, SpO2 100% on RA, reintubated 7/27, Extubated 7/28    RENAL/  #RAZIA (resolved)  - Cr peaked at 3.38 (7/11)  - Currently Cr 1.13 ( 7/24)  Plan:  - Continue to monitor Cr    #ATN i.s.o hypotension  #metabolic acidosis (improving)  Patient was in a state of metabolic acidosis around 7/16, but recent ABG pH in normal range. However, patient has been bicarb deficient throughout the admission.   Plan:  - f/u nephro recs  - hold sodium bicarb 650mg tid considering improvement in hypernatremia and bicarb lvls  - Per nephro, patient is not a dialysis candidate  - trend BUN/Cr   - monitor Is and Os     #Hypernatremia (improving)  Patient having a gradually increasing sodium since transfer to the MICU. Na was improving by adding free water, lowering the bicarb drip, and diuresis with bumex/metolazone. Now downtrending, will hold diuresis.  Plan:  - stopped free water 300mL q6  - hold sodium bicarb 650mg TID given improvement  - hold bumex 1mg q12 IV, metolazone 5mg PO QD    #Urinary retention (improving)  Plan:  - Started Doxazosin 2 mg daily (7/15)  - Monitor I/Os  - Tavarez placed by urology 7/19, making urine  - Removed tavarez 7/30    GASTROINTESTINAL  #Upper GI Bleed (stable)  Course:  - 7/9 CT A/P - Active bleeding in the third portion of the duodenum. Progression of liver metastases.  - 7/9 EGD showed esophagitis, One non-bleeding duodenal ulcer with a clean ulcer base (Arjun Class III). One oozing duodenal ulcer with spurting hemorrhage (Arjun Class Ia). Treatment not successful. Treated with bipolar cautery.  - 7/9 s/p IR GDA embolization  - 7/18 Patient had melanotic stool with hemoglobin drop 10.1 to 7.1; EGD showing duodenal bleed s/p 3 clips + epi  - CTA (7/20) showed no active bleed  - H. pylori negative  - Total EGDs: 3  Plan:  - Continue pantoprazole 40 mg IV q12  - monitor Hgb, transfuse as needed for Hgb <7  - continue to f/u with GI and IR     - Per GI, "Will need PPI BID for 8 weeks for grade D esophagitis and PUD"    - Per GI, high risk of 30 day rebleed (>10-20% risk)  - No acute IR intervention per IR because no active area of extravasation on imaging (7/21)  - No acute GI intervention per GI (7/21)  - Surgery evaluated patient 7/23, may be a candidate for duodenotomy to oversew ulcers, but patient cannot be on aspirin  - Reached out to GI (7/26) about longer-term goals including risk of not being on aspirin vs bleeding;  Per GI, still a risks vs benefits situation as he may bleed again with ASA, patient may require a surgical intervention next  - As per heme/onc, c/w octreotide 250mg IV TID    #Nutrition  Course:  - FEES (7/17) failed  - ENT consulted for vallecular lesion, diagnosed vallecular cyst, no inpatient intervention required, outpatient f/u  - Patient currently extubated (7/24)  - FEES failed 7/29; patient refused evaluation  - NGT replaced (7/30)- was given 1mg ativan, nasal lidocaine gel, affrin prior to NGT insertion  - Speech/swallow today 8/1    Plan:  - No plan to replace, update wife, discuss GOC    #Diarrhea (resolved)  Course:  - c. diff + (7/14)  - vancomycin (7/14 -7/23) active infection dose, vancomycin prophylaxis (7/24-7/25)   Plan:  - No longer on vancomycin  - Infection prevention says contact precautions continued due to mucoid BMs    #Transaminitis (worsened)  Findings:  - Bili 1.1 (7/16) -> 1.3 (7/17) ->0.5 (7/24) -> 0.6 (7/28)  - Alk P 359 (7/16)-> 484 (7/17) -> 174 (7/24) -> 229 (7/28)  -  (7/16)->129 (7/17) -> 75 (7/24) -> 65 (7/28)  - ALT 67 (7/16)-> 72 (7/17) -> 30 (7/24) -> 229 (7/28)  - RUQ US (7/17): known liver mets, no acute findings  Plan:  - Continue atorvastatin 80 mg daily    INFECTIOUS DISEASE  #leukocytosis (resolved)  Cultures:  - Blood cultures negative throughout admission  - Sputum Cx from ETT 7/13 showed numerous gram neg krishna (Klebsiella oxytoca/raoultella ornithinolytica)  - GI PCR negative  - MRSA swab negative  - 7/29 sputum culture growing P aeruginosa   Antibiotics Course:  - Vancomycin (7/14 - 7/23), Vancomycin Prophylaxis (7/24 -7/25)  - Zosyn (7/11-7/18), resistance shown, switched to meropenem  - Meropenem (7/18-7/25)  Plan:  - No longer on antibiotics  - Continue to monitor for fevers  - If febrile start abx for pseudomonas    ENDOCRINE  #adrenal insufficiency (stable)  Findings:  - 7/5 cortisol 17   - 7/11 cortisol 37.1   Plan:  - Taper hydrocortisone 25mg QD for 1 days  - Monitor sugars with steroid    HEMATOLOGY/ONCOLOGY   # neuroendocrine tumor (unchanged)  - was on lanreotide then had POD in liver and started on peptide receptor radiotherapy (PRRT)- typically given every 8 weeks for 4 doses. He last received it 10/20/2023   - PET 5/28/24 shows new somatostatin avid osseous lesions; decreased intensely avid right supradiaphragmatic and upper abdominal nodes, suspicious for mets  - Gastrin level 5509  Plan:  - per heme/onc:     - can check factor levels V, VIII, X     - "f/u with Dr. Sophia Prasad at [facility] after discharge, no plan for treatment inpatient, if clinically improves/stabilizes then can be considered for treatment outpatient"     - c/w octreotide 250 mcg TID    #Normocytic anemia (stable)  - Pt with low Hgb 7-9  - ISO of GI bleed hx and continued melanotic BMs, there is c/o of rebleed; per GI, high risk of rebleeding, currently seems to be slow bleeding   Plan:  - Surgery and GI made aware & are following   - Continue CBC q12 unless having large melanotic stools    #DVT ppx (unchanged)  - SCDs  - LE duplex (7/17): negative for DVT    SKINS:   - 2x peripheral IV right arm    Ethics:   - Full Code   - GOC 7/12, spoke to spouse (Yamilet) over phone with palliative care team, discussed GOC again on 7/16  - 7/29 - Patient expressed that he wants to die, refuses to see psych, equivocating on DNR status.   - Pulled NGT for 3rd time on 7/30  - Continue GOC discussion.    Consults this admission: GI, Heme Onc, Palliative, Endocrinology, Cardiology, Nephrology, Interventional Radiology, General Surgery MICU DOWN GRADE NOTE    Transfer from: MICU  Transfer to: (  ) Medicine    ( X ) Telemetry     (   ) RCU        (    ) Palliative         (   ) Stroke Unit        Accepting Physician: Dr. Keith Willis  Team (MAR) or ACP service:   Signout given to: Dr. Shalom Perez      Patient is a 79y old  Male who presents with a chief complaint of hypotension (31 Jul 2024 10:31)      HPI:   78 yo male with metastatic neuroendocrine pancreatic cancer (tx on hold) and PMH of CAD s/p PCI x3 with most recent stent 6/12/24 on Plavix and Eliquis, chronic Afib on Eliquis, orthostatic hypotension on midodrine, PAD, recent admission for dx cath on 6/24/24 presented from PCP's office for hypotension despite taking AM midodrine dose on 7/2, admitted to medicine for symptomatic hypotension and RAZIA. Per chart, on 7/8 PM, pt was noted to have acute onset of melena x5 and coffee ground emesis x2-3. RRT was called on 7/9 AM for hypotension, hgb dropped from 9.2 to 7.4 (admission baseline 10-11), FOBT +, pt was started on PPI IV and transfused 1 unit of pRBC. GI was consulted and underwent EGD on 7/9 afternoon. MICU was consulted for uncontrolled bleeding during EGD. During EGD, pt became hypotensive and was given phenylephrine, had A-fib with RVR s/p amiodarone. Now s/p IR GDA embolization, admitted to MICU for further monitoring i.s.o hemorrhagic shock 2/2 GI bleed. On repeat EGD, oozing but no active bleeding was noted. Hospital course c/b c.diff, and patient was started on vancomycin from 7/14-7/23; vancomycin was given prophylactically from 7/24-7/25. Patient had melanotic stool 7/18 with Hgb drop 10.1 to 7.1 and received 2 units pRBC and 1 unit platelets. Patient was reintubated and underwent EGD 7/18 afternoon. GI found duodenal bleed. Patient now s/p 3x clips and epi. Since 7/18 patient has continued to have melanotic stools with hemoglobin drops concerning for slow rebleeding. Currently, discussion revolves around the risks and benefits of antiplatelet therapy for recent stent vs risk of GI rebleed, and potential to undergo surgery. Last blood transfusion was given on 7/28; pt's Hgb has been stable since then. On 7/11, pt began developing leukocytosis - was started on Zosyn. Sputum culture tested positive for CRE Klebsiella (7/13). Zosyn was then discontinued d/t resistance (7/18), and pt was started on meropenem from 7/18 to 7/25. On 7/28, pt self-removed NG tube. Pt was evaluated by speech and swallow team; however, failed evaluation. On 7/30, NGT was replaced, which pt had self-removed later on that same day. No plan to replace NGT for now. Pt was reevaluated by speech and swallow team on 8/1, who recommended barium swallow study.    - Total transfusions: 17 pRBC, 4 platelet, 5 FFP (as of 7/28)      TO-DOs:  [ ] f/u barium swallow study - speech and swallow recs  [ ] monitor Hgb - transfuse as needed  [ ] f/u GI recs regarding A/C recs  [ ] f/u cardio recs regarding Afib and post-stent management   [ ] f/u heme/onc recs regarding metastatic neuroendocrine pancreatic cancer    REVIEW OF SYSTEMS:  CONSTITUTIONAL: No fever, chills  HEENT:  No blurry vision, No sinus or throat pain  NECK: No pain or stiffness  RESPIRATORY: Cough, no wheezing, chills or hemoptysis; No shortness of breath  CARDIOVASCULAR: No chest pain, palpitations  GASTROINTESTINAL: No abdominal pain. No nausea, vomiting, or diarrhea  GENITOURINARY: No dysuria  NEUROLOGICAL: No HA, No focal weakness  SKIN: No itching, burning, rashes, or lesions   MUSCULOSKELETAL: B/l LE swelling and pain    MEDICATIONS:  atorvastatin 80 milliGRAM(s) Oral at bedtime  chlorhexidine 2% Cloths 1 Application(s) Topical <User Schedule>  chlorhexidine 4% Liquid 1 Application(s) Topical <User Schedule>  digoxin     Tablet 125 MICROGram(s) Oral daily  doxazosin 2 milliGRAM(s) Oral at bedtime  folic acid Injectable 1 milliGRAM(s) IV Push daily  insulin lispro (ADMELOG) corrective regimen sliding scale   SubCutaneous every 6 hours  lactated ringers. 1000 milliLiter(s) IV Continuous <Continuous>  midodrine 10 milliGRAM(s) Oral every 8 hours  multivitamin 1 Tablet(s) Oral daily  octreotide  Injectable 250 MICROGram(s) IV Push three times a day  pantoprazole  Injectable 40 milliGRAM(s) IV Push every 12 hours  sodium chloride 0.9% lock flush 10 milliLiter(s) IV Push every 1 hour PRN  thiamine Injectable 100 milliGRAM(s) IV Push daily      T(C): 36.9 (07-31-24 @ 16:00), Max: 37.2 (07-30-24 @ 20:00)  HR: 115 (07-31-24 @ 16:00) (94 - 142)  BP: 102/60 (07-31-24 @ 16:00) (81/42 - 127/56)  RR: 21 (07-31-24 @ 16:00) (16 - 46)  SpO2: 99% (07-31-24 @ 16:00) (93% - 100%)  Wt(kg): --Vital Signs Last 24 Hrs  T(C): 36.9 (31 Jul 2024 16:00), Max: 37.2 (30 Jul 2024 20:00)  T(F): 98.4 (31 Jul 2024 16:00), Max: 99 (30 Jul 2024 20:00)  HR: 115 (31 Jul 2024 16:00) (94 - 142)  BP: 102/60 (31 Jul 2024 16:00) (81/42 - 127/56)  BP(mean): 76 (31 Jul 2024 16:00) (56 - 86)  RR: 21 (31 Jul 2024 16:00) (16 - 46)  SpO2: 99% (31 Jul 2024 16:00) (93% - 100%)    Parameters below as of 31 Jul 2024 08:00  Patient On (Oxygen Delivery Method): room air        PHYSICAL EXAM:  General: Elderly man lying in bed, awake and alert  Cadio: Irregularly irregular rhythm; normal S1/S2; no murmurs, rubs, or gallops  Neuro: Moves extremities, follows commands; Left toe movement decreased compared to right; sensation to light touch intact b/l LE  Pulm: Clear to auscultation bilaterally; intermittent rhonchorous sounds transmitted from upper airways; normal symmetric excursion; no wheezes, rales, or rhonchi  GI: Normoactive bowel sounds; nontender, nondistended  Ext: Feet cool to touch L>R; unable to appreciated peripheral pulses due to anasarca    Consultant(s) Notes Reviewed:  [x ] YES  [ ] NO  Care Discussed with Consultants/Other Providers [ x] YES  [ ] NO    LABS:                        9.5    8.36  )-----------( 135      ( 31 Jul 2024 12:34 )             30.8     07-31    147<H>  |  108  |  28<H>  ----------------------------<  140<H>  4.4   |  25  |  0.91    Ca    7.9<L>      31 Jul 2024 12:34  Phos  3.0     07-31  Mg     1.8     07-31    TPro  5.1<L>  /  Alb  2.8<L>  /  TBili  0.9  /  DBili  x   /  AST  42<H>  /  ALT  28  /  AlkPhos  185<H>  07-31    PT/INR - ( 31 Jul 2024 00:25 )   PT: 12.4 sec;   INR: 1.19 ratio         PTT - ( 31 Jul 2024 00:25 )  PTT:24.9 sec  Urinalysis Basic - ( 31 Jul 2024 12:34 )    Color: x / Appearance: x / SG: x / pH: x  Gluc: 140 mg/dL / Ketone: x  / Bili: x / Urobili: x   Blood: x / Protein: x / Nitrite: x   Leuk Esterase: x / RBC: x / WBC x   Sq Epi: x / Non Sq Epi: x / Bacteria: x      CAPILLARY BLOOD GLUCOSE      POCT Blood Glucose.: 131 mg/dL (31 Jul 2024 12:15)  POCT Blood Glucose.: 124 mg/dL (31 Jul 2024 05:26)  POCT Blood Glucose.: 125 mg/dL (30 Jul 2024 18:31)      ABG - ( 31 Jul 2024 15:55 )  pH, Arterial: 7.46  pH, Blood: x     /  pCO2: 34    /  pO2: 83    / HCO3: 24    / Base Excess: 0.5   /  SaO2: 99.2              Urinalysis Basic - ( 31 Jul 2024 12:34 )    Color: x / Appearance: x / SG: x / pH: x  Gluc: 140 mg/dL / Ketone: x  / Bili: x / Urobili: x   Blood: x / Protein: x / Nitrite: x   Leuk Esterase: x / RBC: x / WBC x   Sq Epi: x / Non Sq Epi: x / Bacteria: x        RADIOLOGY & ADDITIONAL TESTS:    Imaging Personally Reviewed:  [x ] YES  [ ] NO    ASSESSMENT:  78 yo male with metastatic neuroendocrine pancreatic cancer (tx on hold) and PMH of CAD s/p PCI x3 with most recent stent 6/12/24 on Plavix and eliquis , chronic Afib on eliquis, orthostatic hypotension on midodrine, PAD , recent admission for dx cath on 6/24/24 presented from PCP's office for hypotension despite taking AM midodrine dose on 7/2, admitted to medicine for symptomatic hypotension and RAZIA. Per chart, on 7/8 PM, pt was noted to have acute onset of melena x5 and coffee ground emesis x2-3. RRT was called on 7/9 AM for hypotension, hgb dropped from 9.2 to 7.4 (admission baseline 10-11), FOBT +, pt was started on PPI IV and transfused 1 unit of pRBC. GI was consulted and underwent EGD on 7/9 afternoon. MICU was consulted for uncontrolled bleeding during EGD. During EGD, pt became hypotensive and was given phenylephrine, had A-fib with RVR s/p amiodarone. Now s/p IR GDA embolization, admitted to MICU for further monitoring i.s.o hemorrhagic shock 2/2 GI bleed. On repeat EGD, oozing but no active bleeding was noted. Hospital course c/b c.diff, and patient has been placed on vancomycin. Patient had melanotic stool 7/18 with Hgb drop 10.1 to 7.1 and received 2 units pRBC and 1 unit platelets. Patient was reintubated and underwent EGD 7/18 afternoon. GI found duodenal bleed. Patient now s/p 3x clips and epi. Since 7/18 patient has continued to have melanotic stools with hemoglobin drops concerning for slow rebleeding. Currently, discussion revolves around the risks and benefits of antiplatelet therapy for recent stent vs risk of GI rebleed, and potential to undergo surgery.    - Total transfusions: 17 pRBC, 4 platelet, 5 FFP (as of 7/28)    Plan:     NEUROLOGIC  # Baseline AOx3 (waxing/waning)  Course:  - intubated 7/9, extubated 7/15  - reintubated 7/18 for EGD, extubated 7/22  -reintubated 7/23, extubated  - not sedated  - Currently AOx2-3  Plan:  - Delirium precautions    CARDIOVASCULAR  # hemorrhagic shock (worsened)  Course:  - Per spouse, pt's baseline BP is a little bit over 100  - 7/9: RRT called 7/9 for hypotension; 2/2 to Upper GI bleeds, urgently took to EGD, found bleeding ulcer that was not well controlled, underwent IR GDA embolization  - 7/18, patient had large melanotic stool with Hgb drop from 10.1-7.1, patient was restarted on henny, vaso, and levo for reintubation for GI scope; s/p 2pRBC, 1 platelet  - Total transfusions: 16 pRBC, 4 platelet, 5 FFP  Findings:  - AM cortisol 17 (7/5), AM cortisol 37.1 (7/11)  - POCUS ECHO (7/16) showed no cardiogenic shock, no tamponade, low SV indeterminate IVC, irregular B lines but not concerning for pulmonary edema, and some subdiaphragmatic fluid  Plan:  - Continue to monitor CBC, transfuse for Hgb <7  - Continue to hold Plavix 75 mg  - Off pressors    #CAD s/p PCI x3, most recent stent 6/12/24, NURIA to pLAD (stable)  Course:  - 7/13 restarted plavix for new stent (6/12/24) per GI, 7/18 held plavix due to bleeding  - 7/22 started low-dose aspirin as safer alternative to plavix per cardiology, but d/c 7/23 as patient bled again  Plan:  - Continue to hold Plavix 75 mg and aspirin 81 mg      #PAD  # A-fib on eliquis (worsening)  May have been worsened because of GI bleed  - s/p successful DCCV 10/31   - 7/16 In RVR, 2 doses of amio overnight; Cards recommended resuming home dose sotalol 40 mg PO (qTC wnl), s/p 1 dose sotalol but RVR persisted  - 7/16 Started amio loading  - 7/22 Added digoxin load for persistent HR 130s over past week  - 7/23 Dig level 1.3, patient's HR back in 60s in the afternoon  - 7/28 Patient pulled NG tube, unable to get several doses of amio, back in afib with RVR  - 7/30 NGT replaced - was pulled out later in the PM  Plan:  - Hold eliquis   - Hold home sotalol  - Cardiology following, advises against chemical cardioversion off AC if possible  - digoxin lvl 1.3  - c/w digoxin 62.5 MICROgrams IV push daily   - Repeat digoxin level on 8/3    #NSVT (stable)  Likely 2/2 cardiac structural damage given prior stents as well as electrolyte abnormalities while being diuresed. Troponins elevated but stable today. Troponins also elevated earlier in admission. EKG without ST elevations.  Plan:  - Continue to monitor  - Replete electrolytes as necessary    PULMONARY  #Intubated for airway protection prior to EGD/IR embolization (resolved)  Course:  - Extubated 7/22, SpO2 100% on RA, reintubated 7/27, Extubated 7/28    RENAL/  #RAZIA (resolved)  - Cr peaked at 3.38 (7/11)  - Currently Cr 1.13 ( 7/24)  Plan:  - Continue to monitor Cr    #ATN i.s.o hypotension  #metabolic acidosis (improving)  Patient was in a state of metabolic acidosis around 7/16, but recent ABG pH in normal range. However, patient has been bicarb deficient throughout the admission.   Plan:  - f/u nephro recs  - hold sodium bicarb 650mg tid considering improvement in hypernatremia and bicarb lvls  - Per nephro, patient is not a dialysis candidate  - trend BUN/Cr   - monitor Is and Os     #Hypernatremia (improving)  Patient having a gradually increasing sodium since transfer to the MICU. Na was improving by adding free water, lowering the bicarb drip, and diuresis with bumex/metolazone. Now downtrending, will hold diuresis.  Plan:  - stopped free water 300mL q6  - hold sodium bicarb 650mg TID given improvement  - hold bumex 1mg q12 IV, metolazone 5mg PO QD    #Urinary retention (improving)  Plan:  - Started Doxazosin 2 mg daily (7/15)  - Monitor I/Os  - Tavarez placed by urology 7/19, making urine  - Removed tavarez 7/30    GASTROINTESTINAL  #Upper GI Bleed (stable)  Course:  - 7/9 CT A/P - Active bleeding in the third portion of the duodenum. Progression of liver metastases.  - 7/9 EGD showed esophagitis, One non-bleeding duodenal ulcer with a clean ulcer base (Arjun Class III). One oozing duodenal ulcer with spurting hemorrhage (Arjun Class Ia). Treatment not successful. Treated with bipolar cautery.  - 7/9 s/p IR GDA embolization  - 7/18 Patient had melanotic stool with hemoglobin drop 10.1 to 7.1; EGD showing duodenal bleed s/p 3 clips + epi  - CTA (7/20) showed no active bleed  - H. pylori negative  - Total EGDs: 3  Plan:  - Continue pantoprazole 40 mg IV q12  - monitor Hgb, transfuse as needed for Hgb <7  - continue to f/u with GI and IR     - Per GI, "Will need PPI BID for 8 weeks for grade D esophagitis and PUD"    - Per GI, high risk of 30 day rebleed (>10-20% risk)  - No acute IR intervention per IR because no active area of extravasation on imaging (7/21)  - No acute GI intervention per GI (7/21)  - Surgery evaluated patient 7/23, may be a candidate for duodenotomy to oversew ulcers, but patient cannot be on aspirin  - Reached out to GI (7/26) about longer-term goals including risk of not being on aspirin vs bleeding;  Per GI, still a risks vs benefits situation as he may bleed again with ASA, patient may require a surgical intervention next  - As per heme/onc, c/w octreotide 250mg IV TID    #Nutrition  Course:  - FEES (7/17) failed  - ENT consulted for vallecular lesion, diagnosed vallecular cyst, no inpatient intervention required, outpatient f/u  - Patient currently extubated (7/24)  - FEES failed 7/29; patient refused evaluation  - NGT replaced (7/30)- was given 1mg ativan, nasal lidocaine gel, affrin prior to NGT insertion  - Speech/swallow today 8/1    Plan:  - No plan to replace, update wife, discuss GOC    #Diarrhea (resolved)  Course:  - c. diff + (7/14)  - vancomycin (7/14 -7/23) active infection dose, vancomycin prophylaxis (7/24-7/25)   Plan:  - No longer on vancomycin  - Infection prevention says contact precautions continued due to mucoid BMs    #Transaminitis (worsened)  Findings:  - Bili 1.1 (7/16) -> 1.3 (7/17) ->0.5 (7/24) -> 0.6 (7/28)  - Alk P 359 (7/16)-> 484 (7/17) -> 174 (7/24) -> 229 (7/28)  -  (7/16)->129 (7/17) -> 75 (7/24) -> 65 (7/28)  - ALT 67 (7/16)-> 72 (7/17) -> 30 (7/24) -> 229 (7/28)  - RUQ US (7/17): known liver mets, no acute findings  Plan:  - Continue atorvastatin 80 mg daily    INFECTIOUS DISEASE  #leukocytosis (resolved)  Cultures:  - Blood cultures negative throughout admission  - Sputum Cx from ETT 7/13 showed numerous gram neg krishna (Klebsiella oxytoca/raoultella ornithinolytica)  - GI PCR negative  - MRSA swab negative  - 7/29 sputum culture growing P aeruginosa   Antibiotics Course:  - Vancomycin (7/14 - 7/23), Vancomycin Prophylaxis (7/24 -7/25)  - Zosyn (7/11-7/18), resistance shown, switched to meropenem  - Meropenem (7/18-7/25)  Plan:  - No longer on antibiotics  - Continue to monitor for fevers  - If febrile start abx for pseudomonas    ENDOCRINE  #adrenal insufficiency (stable)  Findings:  - 7/5 cortisol 17   - 7/11 cortisol 37.1   Plan:  - Taper hydrocortisone 25mg QD for 1 days  - Monitor sugars with steroid    HEMATOLOGY/ONCOLOGY   # neuroendocrine tumor (unchanged)  - was on lanreotide then had POD in liver and started on peptide receptor radiotherapy (PRRT)- typically given every 8 weeks for 4 doses. He last received it 10/20/2023   - PET 5/28/24 shows new somatostatin avid osseous lesions; decreased intensely avid right supradiaphragmatic and upper abdominal nodes, suspicious for mets  - Gastrin level 5509  Plan:  - per heme/onc:     - can check factor levels V, VIII, X     - "f/u with Dr. Sophia Prasad at [facility] after discharge, no plan for treatment inpatient, if clinically improves/stabilizes then can be considered for treatment outpatient"     - c/w octreotide 250 mcg TID    #Normocytic anemia (stable)  - Pt with low Hgb 7-9  - ISO of GI bleed hx and continued melanotic BMs, there is c/o of rebleed; per GI, high risk of rebleeding, currently seems to be slow bleeding   Plan:  - Surgery and GI made aware & are following   - Continue CBC q12 unless having large melanotic stools    #DVT ppx (unchanged)  - SCDs  - LE duplex (7/17): negative for DVT    SKINS:   - 2x peripheral IV right arm    Ethics:   - Full Code   - GOC 7/12, spoke to spouse (Yamilet) over phone with palliative care team, discussed GOC again on 7/16  - 7/29 - Patient expressed that he wants to die, refuses to see psych, equivocating on DNR status.   - Pulled NGT for 3rd time on 7/30  - Continue GOC discussion.    Consults this admission: GI, Heme Onc, Palliative, Endocrinology, Cardiology, Nephrology, Interventional Radiology, General Surgery

## 2024-07-31 NOTE — PROGRESS NOTE ADULT - SUBJECTIVE AND OBJECTIVE BOX
DATE OF SERVICE: 07-31-24 @ 18:36    Patient is a 79y old  Male who presents with a chief complaint of hypotension (31 Jul 2024 10:31)      INTERVAL HISTORY: Feels ok. Very thirsty.     REVIEW OF SYSTEMS:  CONSTITUTIONAL: No weakness  EYES/ENT: No visual changes;  No throat pain   NECK: No pain or stiffness  RESPIRATORY: No cough, wheezing; No shortness of breath  CARDIOVASCULAR: No chest pain or palpitations  GASTROINTESTINAL: No abdominal  pain. No nausea, vomiting, or hematemesis  GENITOURINARY: No dysuria, frequency or hematuria  NEUROLOGICAL: No stroke like symptoms  SKIN: No rashes    TELEMETRY Personally reviewed: AF 90-140s  	  MEDICATIONS:  digoxin  Injectable 62.5 MICROGram(s) IV Push daily  midodrine 10 milliGRAM(s) Oral every 8 hours        PHYSICAL EXAM:  T(C): 36.9 (07-31-24 @ 16:00), Max: 37.2 (07-30-24 @ 20:00)  HR: 106 (07-31-24 @ 17:00) (94 - 142)  BP: 101/56 (07-31-24 @ 17:00) (81/42 - 127/56)  RR: 25 (07-31-24 @ 17:00) (16 - 46)  SpO2: 100% (07-31-24 @ 17:00) (93% - 100%)  Wt(kg): --  I&O's Summary    30 Jul 2024 07:01  -  31 Jul 2024 07:00  --------------------------------------------------------  IN: 590 mL / OUT: 600 mL / NET: -10 mL    31 Jul 2024 07:01  -  31 Jul 2024 18:36  --------------------------------------------------------  IN: 400 mL / OUT: 300 mL / NET: 100 mL          Appearance: In no distress	  HEENT:    PERRL, EOMI	  Cardiovascular:  S1 S2, + JVD  Respiratory: Lungs clear to auscultation	  Gastrointestinal:  Soft, Non-tender, + BS	  Vascularature:  + edema of LE  Psychiatric: Appropriate affect   Neuro: no acute focal deficits                               9.5    8.36  )-----------( 135      ( 31 Jul 2024 12:34 )             30.8     07-31    147<H>  |  108  |  28<H>  ----------------------------<  140<H>  4.4   |  25  |  0.91    Ca    7.9<L>      31 Jul 2024 12:34  Phos  3.0     07-31  Mg     1.8     07-31    TPro  5.1<L>  /  Alb  2.8<L>  /  TBili  0.9  /  DBili  x   /  AST  42<H>  /  ALT  28  /  AlkPhos  185<H>  07-31        Labs personally reviewed      ASSESSMENT/PLAN: 	    78-year-old male multiple medical problems history of hepatocellular carcinoma status post radiation therapy, AF s/p DCCV on Eliquis who was found to be hypotensive following NST. Patient has reported general weakness, fatigue, lightheadedness since being discharged from hospital. Reports mild chest discomfort in midsternal region. Reports dyspnea with minimal exertion. Also reports significant lack of appetite and poor PO intake. No dark or bloody stool, nausea or vomiting.       Problem/Plan - 1:  ·  Problem: Hypotension  ·  Plan: Continue with pressors in MICU  - Patient presents with hypotension and also RAZIA. Has known orthostatic hypotension.   - Patient and wife report decreased PO intake likely 2/2 malignancy.  - GIB 7/9, s/p 4 units prbc, IR for mesenteric embolization, now in MICU on pressors  - c/w Midodrine 10mg PO [id number]     Problem/Plan - 2:  ·  Problem: CAD.   ·  Plan: Recent PCI to pLAD in June 2023  - ECG non-ischemic  - Plavix held given large melena; ideally should be on Plavix but high risk to resume      Problem/Plan - 3:  ·  Problem: Atrial Fibrillation  - s/p succesful DCCV 10/31  - Hold Eliquis 5mg BID given GIB  - s/p Amio  - However would advise against chemical cardioversion off AC if possible  - HR elevated. Dig increased to 62.5mcg IVP daily        Sulma Shasta, AG-NP   Hank Javdan, DO [facility]  Cardiovascular Medicine  [address] [place] [address], [address]  Available through call or text on [employer]  Office: [phone]

## 2024-07-31 NOTE — PROGRESS NOTE ADULT - SUBJECTIVE AND OBJECTIVE BOX
Patient is a 79y old  Male who presents with a chief complaint of hypotension (31 Jul 2024 08:06)      MEDICATIONS  (STANDING):  atorvastatin 80 milliGRAM(s) Oral at bedtime  chlorhexidine 2% Cloths 1 Application(s) Topical <User Schedule>  chlorhexidine 4% Liquid 1 Application(s) Topical <User Schedule>  digoxin     Tablet 125 MICROGram(s) Oral daily  doxazosin 2 milliGRAM(s) Oral at bedtime  folic acid Injectable 1 milliGRAM(s) IV Push daily  insulin lispro (ADMELOG) corrective regimen sliding scale   SubCutaneous every 6 hours  midodrine 10 milliGRAM(s) Oral every 8 hours  multivitamin 1 Tablet(s) Oral daily  octreotide  Injectable 250 MICROGram(s) IV Push three times a day  pantoprazole  Injectable 40 milliGRAM(s) IV Push every 12 hours  potassium chloride  20 mEq/100 mL IVPB 20 milliEquivalent(s) IV Intermittent every 4 hours  thiamine Injectable 100 milliGRAM(s) IV Push daily    MEDICATIONS  (PRN):  sodium chloride 0.9% lock flush 10 milliLiter(s) IV Push every 1 hour PRN Pre/post blood products, medications, blood draw, and to maintain line patency      ROS  No fever, sweats, chills  No epistaxis, HA, sore throat  No CP, SOB, cough, sputum  No n/v/d, abd pain, melena, hematochezia  No edema  No rash  No anxiety  No back pain, joint pain  No bleeding, bruising  No dysuria, hematuria    Vital Signs Last 24 Hrs  T(C): 36.4 (31 Jul 2024 08:00), Max: 37.2 (30 Jul 2024 20:00)  T(F): 97.6 (31 Jul 2024 08:00), Max: 99 (30 Jul 2024 20:00)  HR: 101 (31 Jul 2024 10:00) (85 - 142)  BP: 103/60 (31 Jul 2024 10:00) (81/42 - 127/56)  BP(mean): 75 (31 Jul 2024 10:00) (56 - 86)  RR: 19 (31 Jul 2024 10:00) (16 - 46)  SpO2: 100% (31 Jul 2024 10:00) (93% - 100%)    Parameters below as of 31 Jul 2024 08:00  Patient On (Oxygen Delivery Method): room air      PE  Awake, alert  Anicteric, MMM  RRR  CTAB  anasarca  No c/c                        8.2    8.74  )-----------( 117      ( 31 Jul 2024 00:25 )             25.7       07-31    145  |  108  |  28<H>  ----------------------------<  116<H>  3.5   |  23  |  0.85    Ca    7.8<L>      31 Jul 2024 00:25  Phos  2.5     07-31  Mg     1.8     07-31    TPro  4.3<L>  /  Alb  2.3<L>  /  TBili  0.9  /  DBili  x   /  AST  39  /  ALT  26  /  AlkPhos  179<H>  07-31       Patient is a 79y old  Male who presents with a chief complaint of hypotension (31 Jul 2024 08:06)      MEDICATIONS  (STANDING):  atorvastatin 80 milliGRAM(s) Oral at bedtime  chlorhexidine 2% Cloths 1 Application(s) Topical <User Schedule>  chlorhexidine 4% Liquid 1 Application(s) Topical <User Schedule>  digoxin     Tablet 125 MICROGram(s) Oral daily  doxazosin 2 milliGRAM(s) Oral at bedtime  folic acid Injectable 1 milliGRAM(s) IV Push daily  insulin lispro (ADMELOG) corrective regimen sliding scale   SubCutaneous every 6 hours  midodrine 10 milliGRAM(s) Oral every 8 hours  multivitamin 1 Tablet(s) Oral daily  octreotide  Injectable 250 MICROGram(s) IV Push three times a day  pantoprazole  Injectable 40 milliGRAM(s) IV Push every 12 hours  potassium chloride  20 mEq/100 mL IVPB 20 milliEquivalent(s) IV Intermittent every 4 hours  thiamine Injectable 100 milliGRAM(s) IV Push daily    MEDICATIONS  (PRN):  sodium chloride 0.9% lock flush 10 milliLiter(s) IV Push every 1 hour PRN Pre/post blood products, medications, blood draw, and to maintain line patency          Vital Signs Last 24 Hrs  T(C): 36.4 (31 Jul 2024 08:00), Max: 37.2 (30 Jul 2024 20:00)  T(F): 97.6 (31 Jul 2024 08:00), Max: 99 (30 Jul 2024 20:00)  HR: 101 (31 Jul 2024 10:00) (85 - 142)  BP: 103/60 (31 Jul 2024 10:00) (81/42 - 127/56)  BP(mean): 75 (31 Jul 2024 10:00) (56 - 86)  RR: 19 (31 Jul 2024 10:00) (16 - 46)  SpO2: 100% (31 Jul 2024 10:00) (93% - 100%)    Parameters below as of 31 Jul 2024 08:00  Patient On (Oxygen Delivery Method): room air      PE  Awake, alert  Anicteric, MMM  RRR  CTAB  anasarca  No c/c                        8.2    8.74  )-----------( 117      ( 31 Jul 2024 00:25 )             25.7       07-31    145  |  108  |  28<H>  ----------------------------<  116<H>  3.5   |  23  |  0.85    Ca    7.8<L>      31 Jul 2024 00:25  Phos  2.5     07-31  Mg     1.8     07-31    TPro  4.3<L>  /  Alb  2.3<L>  /  TBili  0.9  /  DBili  x   /  AST  39  /  ALT  26  /  AlkPhos  179<H>  07-31

## 2024-07-31 NOTE — PROGRESS NOTE ADULT - ASSESSMENT
Assessment	  Assessment: 79 yo male with metastatic neuroendocrine pancreatic cancer (tx on hold) and PMH of CAD s/p PCI x3 with most recent stent 6/12/24 on Plavix and eliquis , chronic Afib on eliquis, orthostatic hypotension on midodrine, PAD , recent admission for dx cath on 6/24/24 presented from PCP's office for hypotension despite taking AM midodrine dose on 7/2, admitted to medicine for symptomatic hypotension and RAZIA. Per chart, on 7/8 PM, pt was noted to have acute onset of melena x5 and coffee ground emesis x2-3. RRT was called on 7/9 AM for hypotension, hgb dropped from 9.2 to 7.4 (admission baseline 10-11), FOBT +, pt was started on PPI IV and transfused 1 unit of pRBC. GI was consulted and underwent EGD on 7/9 afternoon. MICU was consulted for uncontrolled bleeding during EGD. During EGD, pt became hypotensive and was given phenylephrine, had A-fib with RVR s/p amiodarone. Now s/p IR GDA embolization, admitted to MICU for further monitoring i.s.o hemorrhagic shock 2/2 GI bleed. On repeat EGD, oozing but no active bleeding was noted. Hospital course c/b c.diff, and patient has been placed on vancomycin. Patient had melanotic stool 7/18 with Hgb drop 10.1 to 7.1 and received 2 units pRBC and 1 unit platelets. Patient was reintubated and underwent EGD 7/18 afternoon. GI found duodenal bleed. Patient now s/p 3x clips and epi. Since 7/18 patient has continued to have melanotic stools with hemoglobin drops concerning for slow rebleeding. Currently, discussion revolves around the risks and benefits of antiplatelet therapy for recent stent vs risk of GI rebleed, and potential to undergo surgery.    - Total transfusions: 17 pRBC, 4 platelet, 5 FFP (as of 7/28)    Plan:     NEUROLOGIC  # Baseline AOx3 (waxing/waning)  Course:  - intubated 7/9, extubated 7/15  - reintubated 7/18 for EGD, extubated 7/22  -reintubated 7/23, extubated  - not sedated  - Currently AOx2-3  Plan:  - Delirium precautions    CARDIOVASCULAR  # hemorrhagic shock (worsened)  Course:  - Per spouse, pt's baseline BP is a little bit over 100  - 7/9: RRT called 7/9 for hypotension; 2/2 to Upper GI bleeds, urgently took to EGD, found bleeding ulcer that was not well controlled, underwent IR GDA embolization  - 7/18, patient had large melanotic stool with Hgb drop from 10.1-7.1, patient was restarted on henny, vaso, and levo for reintubation for GI scope; s/p 2pRBC, 1 platelet  - Total transfusions: 16 pRBC, 4 platelet, 5 FFP  Findings:  - AM cortisol 17 (7/5), AM cortisol 37.1 (7/11)  - POCUS ECHO (7/16) showed no cardiogenic shock, no tamponade, low SV indeterminate IVC, irregular B lines but not concerning for pulmonary edema, and some subdiaphragmatic fluid  Plan:  - Continue to monitor CBC, transfuse for Hgb <7  - Continue to hold Plavix 75 mg  - decreased hydrocortisone 50mg BID to QD for refractory shock  - Received midodrine 10 once on 7/30 prior to pulling NGT    #CAD s/p PCI x3, most recent stent 6/12/24, NURIA to pLAD (stable)  Course:  - 7/13 restarted plavix for new stent (6/12/24) per GI, 7/18 held plavix due to bleeding  - 7/22 started low-dose aspirin as safer alternative to plavix per cardiology, but d/c 7/23 as patient bled again  Plan:  - Continue to hold Plavix 75 mg and aspirin 81 mg  - Resume atorvastatin 80 mg daily if NGT replaced    #PAD  # A-fib on eliquis (worsening)  May have been worsened because of GI bleed  - s/p successful DCCV 10/31   - 7/16 In RVR, 2 doses of amio overnight; Cards recommended resuming home dose sotalol 40 mg PO (qTC wnl), s/p 1 dose sotalol but RVR persisted  - 7/16 Started amio loading  - 7/22 Added digoxin load for persistent HR 130s over past week  - 7/23 Dig level 1.3, patient's HR back in 60s in the afternoon  - 7/28 Patient pulled NG tube, unable to get several doses of amio, back in afib with RVR  - 7/30 NGT replaced - will restart amio  Plan:  - Hold eliquis   - Continue digoxin maintenance dose 125 mcg daily  - Hold home sotalol  - Cardiology following, advises against chemical cardioversion off AC if possible  - Resume amiodarone 200 mg daily if NGT replaced    #NSVT (stable)  Likely 2/2 cardiac structural damage given prior stents as well as electrolyte abnormalities while being diuresed. Troponins elevated but stable today. Troponins also elevated earlier in admission. EKG without ST elevations.  Plan:  - Continue to monitor  - Replete electrolytes as necessary    PULMONARY  #Intubated for airway protection prior to EGD/IR embolization (resolved)  Course:  - Extubated 7/22, SpO2 100% on RA, reintubated 7/27, Extubated 7/28    RENAL/  #RAZIA (resolved)  - Cr peaked at 3.38 (7/11)  - Currently Cr 1.13 ( 7/24)  Plan:  - Continue to monitor Cr    #ATN i.s.o hypotension  #metabolic acidosis (improving)  Patient was in a state of metabolic acidosis around 7/16, but recent ABG pH in normal range. However, patient has been bicarb deficient throughout the admission.   Plan:  - f/u nephro recs  - hold sodium bicarb 650mg tid considering improvement in hypernatremia and bicarb lvls  - Per nephro, patient is not a dialysis candidate  - trend BUN/Cr   - monitor Is and Os     #Hypernatremia (improving)  Patient having a gradually increasing sodium since transfer to the MICU. Na was improving by adding free water, lowering the bicarb drip, and diuresis with bumex/metolazone. Now downtrending, will hold diuresis.  Plan:  - stopped free water 300mL q6  - hold sodium bicarb 650mg TID given improvement  - hold bumex 1mg q12 IV, metolazone 5mg PO QD    #Urinary retention (improving)  Plan:  - Started Doxazosin 2 mg daily (7/15)  - Monitor I/Os  - Tavarez placed by urology 7/19, making urine  - Removed tavarez 7/30    GASTROINTESTINAL  #Upper GI Bleed (stable)  Course:  - 7/9 CT A/P - Active bleeding in the third portion of the duodenum. Progression of liver metastases.  - 7/9 EGD showed esophagitis, One non-bleeding duodenal ulcer with a clean ulcer base (Arjun Class III). One oozing duodenal ulcer with spurting hemorrhage (Arjun Class Ia). Treatment not successful. Treated with bipolar cautery.  - 7/9 s/p IR GDA embolization  - 7/18 Patient had melanotic stool with hemoglobin drop 10.1 to 7.1; EGD showing duodenal bleed s/p 3 clips + epi  - CTA (7/20) showed no active bleed  - H. pylori negative  - Total EGDs: 3  Plan:  - Continue pantoprazole 40 mg IV q12  - monitor Hgb, transfuse as needed for Hgb <7  - continue to f/u with GI and IR     - Per GI, "Will need PPI BID for 8 weeks for grade D esophagitis and PUD"    - Per GI, high risk of 30 day rebleed (>10-20% risk)  - No acute IR intervention per IR because no active area of extravasation on imaging (7/21)  - No acute GI intervention per GI (7/21)  - Surgery evaluated patient 7/23, may be a candidate for duodenotomy to oversew ulcers, but patient cannot be on aspirin  - Reached out to GI (7/26) about longer-term goals including risk of not being on aspirin vs bleeding;  ·	Per GI, still a risks vs benefits situation as he may bleed again with ASA, patient may require a surgical intervention next  ·	As per GI, started on octreotide 250mg IV TID    #Nutrition  Course:  - FEES (7/17) failed  - ENT consulted for vallecular lesion, diagnosed vallecular cyst, no inpatient intervention required, outpatient f/u  - Patient currently extubated (7/24)  - FEES failed 7/29; patient refused evaluation  - NGT replaced (7/30)- was given 1mg ativan, nasal lidocaine gel, affrin prior to NGT insertion  - Pulled NGT 7/30 at 22:00, willing to replace    Plan:  - following NGT replacement today, will resume tube feeds and multivitamin per RD tomorrow    #Diarrhea (resolved)  Course:  - c. diff + (7/14)  - vancomycin (7/14 -7/23) active infection dose, vancomycin prophylaxis (7/24-7/25)   Plan:  - No longer on vancomycin  - Infection prevention says contact precautions continued due to mucoid BMs    #Transaminitis (worsened)  Findings:  - Bili 1.1 (7/16) -> 1.3 (7/17) ->0.5 (7/24) -> 0.6 (7/28)  - Alk P 359 (7/16)-> 484 (7/17) -> 174 (7/24) -> 229 (7/28)  -  (7/16)->129 (7/17) -> 75 (7/24) -> 65 (7/28)  - ALT 67 (7/16)-> 72 (7/17) -> 30 (7/24) -> 229 (7/28)  - RUQ US (7/17): known liver mets, no acute findings  Plan:  - Continue atorvastatin 80 mg daily    INFECTIOUS DISEASE  #leukocytosis (resolved)  Cultures:  - Blood cultures negative throughout admission  - Sputum Cx from ETT 7/13 showed numerous gram neg krishna (Klebsiella oxytoca/raoultella ornithinolytica)  - GI PCR negative  - MRSA swab negative  - 7/29 sputum culture growing P aeruginosa   Antibiotics Course:  - Vancomycin (7/14 - 7/23), Vancomycin Prophylaxis (7/24 -7/25)  - Zosyn (7/11-7/18), resistance shown, switched to meropenem  - Meropenem (7/18-7/25)  Plan:  - No longer on antibiotics  - Continue to monitor for fevers  - If febrile start abx for pseudomonas    ENDOCRINE  #adrenal insufficiency (stable)  Findings:  - 7/5 cortisol 17   - 7/11 cortisol 37.1   Plan:  - decreased hydrocortisone 50mg IV from BID to QD  - Monitor sugars with steroid    HEMATOLOGY/ONCOLOGY   # neuroendocrine tumor (unchanged)  - was on lanreotide then had POD in liver and started on peptide receptor radiotherapy (PRRT)- typically given every 8 weeks for 4 doses. He last received it 10/20/2023   - PET 5/28/24 shows new somatostatin avid osseous lesions; decreased intensely avid right supradiaphragmatic and upper abdominal nodes, suspicious for mets  - Gastrin level 5509  Plan:  - per heme/onc:    - can resume octreotide 150 mcg bid once infection r/o      - can check factor levels V, VIII, X     - "f/u with Dr. Sophia Prasad at [facility] after discharge, no plan for treatment inpatient, if clinically improves/stabilizes then can be considered for treatment outpatient"    #Normocytic anemia (stable)  - Pt with low Hgb 7-9  - ISO of GI bleed hx and continued melanotic BMs, there is c/o of rebleed; per GI, high risk of rebleeding, currently seems to be slow bleeding   Plan:  - Surgery and GI made aware & are following   - Continue CBC q12 unless having large melanotic stools    #DVT ppx (unchanged)  - SCDs  - LE duplex (7/17): negative for DVT    SKINS:   - Lines/Tubes - PIV, cordis replaced with central line (7/14-7/23), A line (7/20-7/29), Subclavian central line (7/24- )  - Removed A-line (R axillary) 7/29  - Removed tavarez 7/30, monitor urine output    Ethics:   - Full Code   - GOC 7/12, spoke to spouse (Yamilet) over phone with palliative care team, discussed GOC again on 7/16  - 7/29 - Patient expressed that he wants to die, refuses to see psych, equivocating on DNR status. However, agreed to replace NGT. Continue GOC discussion.    Consults this admission: GI, Heme Onc, Palliative, Endocrinology, Cardiology, Nephrology, Interventional Radiology, General Surgery   Assessment	  Assessment: 77 yo male with metastatic neuroendocrine pancreatic cancer (tx on hold) and PMH of CAD s/p PCI x3 with most recent stent 6/12/24 on Plavix and eliquis , chronic Afib on eliquis, orthostatic hypotension on midodrine, PAD , recent admission for dx cath on 6/24/24 presented from PCP's office for hypotension despite taking AM midodrine dose on 7/2, admitted to medicine for symptomatic hypotension and RAZIA. Per chart, on 7/8 PM, pt was noted to have acute onset of melena x5 and coffee ground emesis x2-3. RRT was called on 7/9 AM for hypotension, hgb dropped from 9.2 to 7.4 (admission baseline 10-11), FOBT +, pt was started on PPI IV and transfused 1 unit of pRBC. GI was consulted and underwent EGD on 7/9 afternoon. MICU was consulted for uncontrolled bleeding during EGD. During EGD, pt became hypotensive and was given phenylephrine, had A-fib with RVR s/p amiodarone. Now s/p IR GDA embolization, admitted to MICU for further monitoring i.s.o hemorrhagic shock 2/2 GI bleed. On repeat EGD, oozing but no active bleeding was noted. Hospital course c/b c.diff, and patient has been placed on vancomycin. Patient had melanotic stool 7/18 with Hgb drop 10.1 to 7.1 and received 2 units pRBC and 1 unit platelets. Patient was reintubated and underwent EGD 7/18 afternoon. GI found duodenal bleed. Patient now s/p 3x clips and epi. Since 7/18 patient has continued to have melanotic stools with hemoglobin drops concerning for slow rebleeding. Currently, discussion revolves around the risks and benefits of antiplatelet therapy for recent stent vs risk of GI rebleed, and potential to undergo surgery.    - Total transfusions: 17 pRBC, 4 platelet, 5 FFP (as of 7/28)    Plan:     NEUROLOGIC  # Baseline AOx3 (waxing/waning)  Course:  - intubated 7/9, extubated 7/15  - reintubated 7/18 for EGD, extubated 7/22  -reintubated 7/23, extubated  - not sedated  - Currently AOx2-3  Plan:  - Delirium precautions    CARDIOVASCULAR  # hemorrhagic shock (worsened)  Course:  - Per spouse, pt's baseline BP is a little bit over 100  - 7/9: RRT called 7/9 for hypotension; 2/2 to Upper GI bleeds, urgently took to EGD, found bleeding ulcer that was not well controlled, underwent IR GDA embolization  - 7/18, patient had large melanotic stool with Hgb drop from 10.1-7.1, patient was restarted on henny, vaso, and levo for reintubation for GI scope; s/p 2pRBC, 1 platelet  - Total transfusions: 16 pRBC, 4 platelet, 5 FFP  Findings:  - AM cortisol 17 (7/5), AM cortisol 37.1 (7/11)  - POCUS ECHO (7/16) showed no cardiogenic shock, no tamponade, low SV indeterminate IVC, irregular B lines but not concerning for pulmonary edema, and some subdiaphragmatic fluid  Plan:  - Continue to monitor CBC, transfuse for Hgb <7  - Continue to hold Plavix 75 mg  - decreased hydrocortisone 50mg BID to QD for refractory shock  - Received midodrine 10 once on 7/30 prior to pulling NGT    #CAD s/p PCI x3, most recent stent 6/12/24, NURIA to pLAD (stable)  Course:  - 7/13 restarted plavix for new stent (6/12/24) per GI, 7/18 held plavix due to bleeding  - 7/22 started low-dose aspirin as safer alternative to plavix per cardiology, but d/c 7/23 as patient bled again  Plan:  - Continue to hold Plavix 75 mg and aspirin 81 mg      #PAD  # A-fib on eliquis (worsening)  May have been worsened because of GI bleed  - s/p successful DCCV 10/31   - 7/16 In RVR, 2 doses of amio overnight; Cards recommended resuming home dose sotalol 40 mg PO (qTC wnl), s/p 1 dose sotalol but RVR persisted  - 7/16 Started amio loading  - 7/22 Added digoxin load for persistent HR 130s over past week  - 7/23 Dig level 1.3, patient's HR back in 60s in the afternoon  - 7/28 Patient pulled NG tube, unable to get several doses of amio, back in afib with RVR  - 7/30 NGT replaced - will restart amio  Plan:  - Hold eliquis   - Continue digoxin maintenance dose 125 mcg daily  - Hold home sotalol  - Cardiology following, advises against chemical cardioversion off AC if possible  - Check digoxin level  - If low load 0.75 digoxin IV Push  - Resume digoxin 0.25 q8    #NSVT (stable)  Likely 2/2 cardiac structural damage given prior stents as well as electrolyte abnormalities while being diuresed. Troponins elevated but stable today. Troponins also elevated earlier in admission. EKG without ST elevations.  Plan:  - Continue to monitor  - Replete electrolytes as necessary    PULMONARY  #Intubated for airway protection prior to EGD/IR embolization (resolved)  Course:  - Extubated 7/22, SpO2 100% on RA, reintubated 7/27, Extubated 7/28    RENAL/  #RAZIA (resolved)  - Cr peaked at 3.38 (7/11)  - Currently Cr 1.13 ( 7/24)  Plan:  - Continue to monitor Cr    #ATN i.s.o hypotension  #metabolic acidosis (improving)  Patient was in a state of metabolic acidosis around 7/16, but recent ABG pH in normal range. However, patient has been bicarb deficient throughout the admission.   Plan:  - f/u nephro recs  - hold sodium bicarb 650mg tid considering improvement in hypernatremia and bicarb lvls  - Per nephro, patient is not a dialysis candidate  - trend BUN/Cr   - monitor Is and Os     #Hypernatremia (improving)  Patient having a gradually increasing sodium since transfer to the MICU. Na was improving by adding free water, lowering the bicarb drip, and diuresis with bumex/metolazone. Now downtrending, will hold diuresis.  Plan:  - stopped free water 300mL q6  - hold sodium bicarb 650mg TID given improvement  - hold bumex 1mg q12 IV, metolazone 5mg PO QD    #Urinary retention (improving)  Plan:  - Started Doxazosin 2 mg daily (7/15)  - Monitor I/Os  - Tavarez placed by urology 7/19, making urine  - Removed tavarez 7/30    GASTROINTESTINAL  #Upper GI Bleed (stable)  Course:  - 7/9 CT A/P - Active bleeding in the third portion of the duodenum. Progression of liver metastases.  - 7/9 EGD showed esophagitis, One non-bleeding duodenal ulcer with a clean ulcer base (Arjun Class III). One oozing duodenal ulcer with spurting hemorrhage (Arjun Class Ia). Treatment not successful. Treated with bipolar cautery.  - 7/9 s/p IR GDA embolization  - 7/18 Patient had melanotic stool with hemoglobin drop 10.1 to 7.1; EGD showing duodenal bleed s/p 3 clips + epi  - CTA (7/20) showed no active bleed  - H. pylori negative  - Total EGDs: 3  Plan:  - Continue pantoprazole 40 mg IV q12  - monitor Hgb, transfuse as needed for Hgb <7  - continue to f/u with GI and IR     - Per GI, "Will need PPI BID for 8 weeks for grade D esophagitis and PUD"    - Per GI, high risk of 30 day rebleed (>10-20% risk)  - No acute IR intervention per IR because no active area of extravasation on imaging (7/21)  - No acute GI intervention per GI (7/21)  - Surgery evaluated patient 7/23, may be a candidate for duodenotomy to oversew ulcers, but patient cannot be on aspirin  - Reached out to GI (7/26) about longer-term goals including risk of not being on aspirin vs bleeding;  ·	Per GI, still a risks vs benefits situation as he may bleed again with ASA, patient may require a surgical intervention next  ·	As per GI, started on octreotide 250mg IV TID    #Nutrition  Course:  - FEES (7/17) failed  - ENT consulted for vallecular lesion, diagnosed vallecular cyst, no inpatient intervention required, outpatient f/u  - Patient currently extubated (7/24)  - FEES failed 7/29; patient refused evaluation  - NGT replaced (7/30)- was given 1mg ativan, nasal lidocaine gel, affrin prior to NGT insertion  - Pulled NGT 7/30 at 22:00, third time, no plan to replace    Plan:  - No plan to replace, update wife, discuss GOC    #Diarrhea (resolved)  Course:  - c. diff + (7/14)  - vancomycin (7/14 -7/23) active infection dose, vancomycin prophylaxis (7/24-7/25)   Plan:  - No longer on vancomycin  - Infection prevention says contact precautions continued due to mucoid BMs    #Transaminitis (worsened)  Findings:  - Bili 1.1 (7/16) -> 1.3 (7/17) ->0.5 (7/24) -> 0.6 (7/28)  - Alk P 359 (7/16)-> 484 (7/17) -> 174 (7/24) -> 229 (7/28)  -  (7/16)->129 (7/17) -> 75 (7/24) -> 65 (7/28)  - ALT 67 (7/16)-> 72 (7/17) -> 30 (7/24) -> 229 (7/28)  - RUQ US (7/17): known liver mets, no acute findings  Plan:  - Continue atorvastatin 80 mg daily    INFECTIOUS DISEASE  #leukocytosis (resolved)  Cultures:  - Blood cultures negative throughout admission  - Sputum Cx from ETT 7/13 showed numerous gram neg krishna (Klebsiella oxytoca/raoultella ornithinolytica)  - GI PCR negative  - MRSA swab negative  - 7/29 sputum culture growing P aeruginosa   Antibiotics Course:  - Vancomycin (7/14 - 7/23), Vancomycin Prophylaxis (7/24 -7/25)  - Zosyn (7/11-7/18), resistance shown, switched to meropenem  - Meropenem (7/18-7/25)  Plan:  - No longer on antibiotics  - Continue to monitor for fevers  - If febrile start abx for pseudomonas    ENDOCRINE  #adrenal insufficiency (stable)  Findings:  - 7/5 cortisol 17   - 7/11 cortisol 37.1   Plan:  - decreased hydrocortisone 50mg IV from BID to QD  - Monitor sugars with steroid    HEMATOLOGY/ONCOLOGY   # neuroendocrine tumor (unchanged)  - was on lanreotide then had POD in liver and started on peptide receptor radiotherapy (PRRT)- typically given every 8 weeks for 4 doses. He last received it 10/20/2023   - PET 5/28/24 shows new somatostatin avid osseous lesions; decreased intensely avid right supradiaphragmatic and upper abdominal nodes, suspicious for mets  - Gastrin level 5509  Plan:  - per heme/onc:    - can resume octreotide 150 mcg bid once infection r/o      - can check factor levels V, VIII, X     - "f/u with Dr. Sophia Prasad at [facility] after discharge, no plan for treatment inpatient, if clinically improves/stabilizes then can be considered for treatment outpatient"    #Normocytic anemia (stable)  - Pt with low Hgb 7-9  - ISO of GI bleed hx and continued melanotic BMs, there is c/o of rebleed; per GI, high risk of rebleeding, currently seems to be slow bleeding   Plan:  - Surgery and GI made aware & are following   - Continue CBC q12 unless having large melanotic stools    #DVT ppx (unchanged)  - SCDs  - LE duplex (7/17): negative for DVT    SKINS:   - Lines/Tubes - PIV, cordis replaced with central line (7/14-7/23), A line (7/20-7/29), Subclavian central line (7/24- )  - Removed A-line (R axillary) 7/29  - Removed tavarez 7/30, monitor urine output    Ethics:   - Full Code   - GOC 7/12, spoke to spouse (Yamilet) over phone with palliative care team, discussed GOC again on 7/16  - 7/29 - Patient expressed that he wants to die, refuses to see psych, equivocating on DNR status. However, agreed to replace NGT. Continue GOC discussion.    Consults this admission: GI, Heme Onc, Palliative, Endocrinology, Cardiology, Nephrology, Interventional Radiology, General Surgery   Assessment	  Assessment: 77 yo male with metastatic neuroendocrine pancreatic cancer (tx on hold) and PMH of CAD s/p PCI x3 with most recent stent 6/12/24 on Plavix and eliquis , chronic Afib on eliquis, orthostatic hypotension on midodrine, PAD , recent admission for dx cath on 6/24/24 presented from PCP's office for hypotension despite taking AM midodrine dose on 7/2, admitted to medicine for symptomatic hypotension and RAZIA. Per chart, on 7/8 PM, pt was noted to have acute onset of melena x5 and coffee ground emesis x2-3. RRT was called on 7/9 AM for hypotension, hgb dropped from 9.2 to 7.4 (admission baseline 10-11), FOBT +, pt was started on PPI IV and transfused 1 unit of pRBC. GI was consulted and underwent EGD on 7/9 afternoon. MICU was consulted for uncontrolled bleeding during EGD. During EGD, pt became hypotensive and was given phenylephrine, had A-fib with RVR s/p amiodarone. Now s/p IR GDA embolization, admitted to MICU for further monitoring i.s.o hemorrhagic shock 2/2 GI bleed. On repeat EGD, oozing but no active bleeding was noted. Hospital course c/b c.diff, and patient has been placed on vancomycin. Patient had melanotic stool 7/18 with Hgb drop 10.1 to 7.1 and received 2 units pRBC and 1 unit platelets. Patient was reintubated and underwent EGD 7/18 afternoon. GI found duodenal bleed. Patient now s/p 3x clips and epi. Since 7/18 patient has continued to have melanotic stools with hemoglobin drops concerning for slow rebleeding. Currently, discussion revolves around the risks and benefits of antiplatelet therapy for recent stent vs risk of GI rebleed, and potential to undergo surgery.    - Total transfusions: 17 pRBC, 4 platelet, 5 FFP (as of 7/28)    Plan:     NEUROLOGIC  # Baseline AOx3 (waxing/waning)  Course:  - intubated 7/9, extubated 7/15  - reintubated 7/18 for EGD, extubated 7/22  -reintubated 7/23, extubated  - not sedated  - Currently AOx2-3  Plan:  - Delirium precautions    CARDIOVASCULAR  # hemorrhagic shock (worsened)  Course:  - Per spouse, pt's baseline BP is a little bit over 100  - 7/9: RRT called 7/9 for hypotension; 2/2 to Upper GI bleeds, urgently took to EGD, found bleeding ulcer that was not well controlled, underwent IR GDA embolization  - 7/18, patient had large melanotic stool with Hgb drop from 10.1-7.1, patient was restarted on henny, vaso, and levo for reintubation for GI scope; s/p 2pRBC, 1 platelet  - Total transfusions: 16 pRBC, 4 platelet, 5 FFP  Findings:  - AM cortisol 17 (7/5), AM cortisol 37.1 (7/11)  - POCUS ECHO (7/16) showed no cardiogenic shock, no tamponade, low SV indeterminate IVC, irregular B lines but not concerning for pulmonary edema, and some subdiaphragmatic fluid  Plan:  - Continue to monitor CBC, transfuse for Hgb <7  - Continue to hold Plavix 75 mg  - Received midodrine 10 once on 7/30 prior to pulling NGT    #CAD s/p PCI x3, most recent stent 6/12/24, NURIA to pLAD (stable)  Course:  - 7/13 restarted plavix for new stent (6/12/24) per GI, 7/18 held plavix due to bleeding  - 7/22 started low-dose aspirin as safer alternative to plavix per cardiology, but d/c 7/23 as patient bled again  Plan:  - Continue to hold Plavix 75 mg and aspirin 81 mg      #PAD  # A-fib on eliquis (worsening)  May have been worsened because of GI bleed  - s/p successful DCCV 10/31   - 7/16 In RVR, 2 doses of amio overnight; Cards recommended resuming home dose sotalol 40 mg PO (qTC wnl), s/p 1 dose sotalol but RVR persisted  - 7/16 Started amio loading  - 7/22 Added digoxin load for persistent HR 130s over past week  - 7/23 Dig level 1.3, patient's HR back in 60s in the afternoon  - 7/28 Patient pulled NG tube, unable to get several doses of amio, back in afib with RVR  - 7/30 NGT replaced - will restart amio  Plan:  - Hold eliquis   - Continue digoxin maintenance dose 125 mcg daily  - Hold home sotalol  - Cardiology following, advises against chemical cardioversion off AC if possible  - Check digoxin level  - Pharmacy recs for loading/maintenance dose  - F/u vbg, ammonia, cbc @ noon 7/31    #NSVT (stable)  Likely 2/2 cardiac structural damage given prior stents as well as electrolyte abnormalities while being diuresed. Troponins elevated but stable today. Troponins also elevated earlier in admission. EKG without ST elevations.  Plan:  - Continue to monitor  - Replete electrolytes as necessary    PULMONARY  #Intubated for airway protection prior to EGD/IR embolization (resolved)  Course:  - Extubated 7/22, SpO2 100% on RA, reintubated 7/27, Extubated 7/28    RENAL/  #RAZIA (resolved)  - Cr peaked at 3.38 (7/11)  - Currently Cr 1.13 ( 7/24)  Plan:  - Continue to monitor Cr    #ATN i.s.o hypotension  #metabolic acidosis (improving)  Patient was in a state of metabolic acidosis around 7/16, but recent ABG pH in normal range. However, patient has been bicarb deficient throughout the admission.   Plan:  - f/u nephro recs  - hold sodium bicarb 650mg tid considering improvement in hypernatremia and bicarb lvls  - Per nephro, patient is not a dialysis candidate  - trend BUN/Cr   - monitor Is and Os     #Hypernatremia (improving)  Patient having a gradually increasing sodium since transfer to the MICU. Na was improving by adding free water, lowering the bicarb drip, and diuresis with bumex/metolazone. Now downtrending, will hold diuresis.  Plan:  - stopped free water 300mL q6  - hold sodium bicarb 650mg TID given improvement  - hold bumex 1mg q12 IV, metolazone 5mg PO QD    #Urinary retention (improving)  Plan:  - Started Doxazosin 2 mg daily (7/15)  - Monitor I/Os  - Tavarez placed by urology 7/19, making urine  - Removed tavarez 7/30    GASTROINTESTINAL  #Upper GI Bleed (stable)  Course:  - 7/9 CT A/P - Active bleeding in the third portion of the duodenum. Progression of liver metastases.  - 7/9 EGD showed esophagitis, One non-bleeding duodenal ulcer with a clean ulcer base (Arjun Class III). One oozing duodenal ulcer with spurting hemorrhage (Arjun Class Ia). Treatment not successful. Treated with bipolar cautery.  - 7/9 s/p IR GDA embolization  - 7/18 Patient had melanotic stool with hemoglobin drop 10.1 to 7.1; EGD showing duodenal bleed s/p 3 clips + epi  - CTA (7/20) showed no active bleed  - H. pylori negative  - Total EGDs: 3  Plan:  - Continue pantoprazole 40 mg IV q12  - monitor Hgb, transfuse as needed for Hgb <7  - continue to f/u with GI and IR     - Per GI, "Will need PPI BID for 8 weeks for grade D esophagitis and PUD"    - Per GI, high risk of 30 day rebleed (>10-20% risk)  - No acute IR intervention per IR because no active area of extravasation on imaging (7/21)  - No acute GI intervention per GI (7/21)  - Surgery evaluated patient 7/23, may be a candidate for duodenotomy to oversew ulcers, but patient cannot be on aspirin  - Reached out to GI (7/26) about longer-term goals including risk of not being on aspirin vs bleeding;  ·	Per GI, still a risks vs benefits situation as he may bleed again with ASA, patient may require a surgical intervention next  ·	As per GI, started on octreotide 250mg IV TID    #Nutrition  Course:  - FEES (7/17) failed  - ENT consulted for vallecular lesion, diagnosed vallecular cyst, no inpatient intervention required, outpatient f/u  - Patient currently extubated (7/24)  - FEES failed 7/29; patient refused evaluation  - NGT replaced (7/30)- was given 1mg ativan, nasal lidocaine gel, affrin prior to NGT insertion  - Pulled NGT 7/30 at 22:00, third time, no plan to replace    Plan:  - No plan to replace, update wife, discuss GOC    #Diarrhea (resolved)  Course:  - c. diff + (7/14)  - vancomycin (7/14 -7/23) active infection dose, vancomycin prophylaxis (7/24-7/25)   Plan:  - No longer on vancomycin  - Infection prevention says contact precautions continued due to mucoid BMs    #Transaminitis (worsened)  Findings:  - Bili 1.1 (7/16) -> 1.3 (7/17) ->0.5 (7/24) -> 0.6 (7/28)  - Alk P 359 (7/16)-> 484 (7/17) -> 174 (7/24) -> 229 (7/28)  -  (7/16)->129 (7/17) -> 75 (7/24) -> 65 (7/28)  - ALT 67 (7/16)-> 72 (7/17) -> 30 (7/24) -> 229 (7/28)  - RUQ US (7/17): known liver mets, no acute findings  Plan:  - Continue atorvastatin 80 mg daily    INFECTIOUS DISEASE  #leukocytosis (resolved)  Cultures:  - Blood cultures negative throughout admission  - Sputum Cx from ETT 7/13 showed numerous gram neg krishna (Klebsiella oxytoca/raoultella ornithinolytica)  - GI PCR negative  - MRSA swab negative  - 7/29 sputum culture growing P aeruginosa   Antibiotics Course:  - Vancomycin (7/14 - 7/23), Vancomycin Prophylaxis (7/24 -7/25)  - Zosyn (7/11-7/18), resistance shown, switched to meropenem  - Meropenem (7/18-7/25)  Plan:  - No longer on antibiotics  - Continue to monitor for fevers  - If febrile start abx for pseudomonas    ENDOCRINE  #adrenal insufficiency (stable)  Findings:  - 7/5 cortisol 17   - 7/11 cortisol 37.1   Plan:  - Taper hydrocortisone 25mg QD for 2 days  - Monitor sugars with steroid    HEMATOLOGY/ONCOLOGY   # neuroendocrine tumor (unchanged)  - was on lanreotide then had POD in liver and started on peptide receptor radiotherapy (PRRT)- typically given every 8 weeks for 4 doses. He last received it 10/20/2023   - PET 5/28/24 shows new somatostatin avid osseous lesions; decreased intensely avid right supradiaphragmatic and upper abdominal nodes, suspicious for mets  - Gastrin level 5509  Plan:  - per heme/onc:    - can resume octreotide 150 mcg bid once infection r/o      - can check factor levels V, VIII, X     - "[place] [facility] [place] plan for treatment inpatient, if clinically improves/stabilizes then can be considered for treatment outpatient"    #Normocytic anemia (stable)  - Pt with low Hgb 7-9  - ISO of GI bleed hx and continued melanotic BMs, there is c/o of rebleed; per GI, high risk of rebleeding, currently seems to be slow bleeding   Plan:  - Surgery and GI made aware & are following   - Continue CBC q12 unless having large melanotic stools    #DVT ppx (unchanged)  - SCDs  - LE duplex (7/17): negative for DVT    SKINS:   - Lines/Tubes - PIV, cordis replaced with central line (7/14-7/23), A line (7/20-7/29), Subclavian central line (7/24- )  - Removed A-line (R axillary) 7/29  - Removed tavarez 7/30, monitor urine output  - Place peripheral access for removal of central line    Ethics:   - Full Code   - GOC 7/12, spoke to spouse (Yamilet) over phone with palliative care team, discussed GOC again on 7/16  - 7/29 - Patient expressed that he wants to die, refuses to see psych, equivocating on DNR status.   - Pulled NGT for 3rd time on 7/30  - Continue GOC discussion.    Consults this admission: GI, Heme Onc, Palliative, Endocrinology, Cardiology, Nephrology, Interventional Radiology, General Surgery   Assessment	  Assessment: 79 yo male with metastatic neuroendocrine pancreatic cancer (tx on hold) and PMH of CAD s/p PCI x3 with most recent stent 6/12/24 on Plavix and eliquis , chronic Afib on eliquis, orthostatic hypotension on midodrine, PAD , recent admission for dx cath on 6/24/24 presented from PCP's office for hypotension despite taking AM midodrine dose on 7/2, admitted to medicine for symptomatic hypotension and RAZIA. Per chart, on 7/8 PM, pt was noted to have acute onset of melena x5 and coffee ground emesis x2-3. RRT was called on 7/9 AM for hypotension, hgb dropped from 9.2 to 7.4 (admission baseline 10-11), FOBT +, pt was started on PPI IV and transfused 1 unit of pRBC. GI was consulted and underwent EGD on 7/9 afternoon. MICU was consulted for uncontrolled bleeding during EGD. During EGD, pt became hypotensive and was given phenylephrine, had A-fib with RVR s/p amiodarone. Now s/p IR GDA embolization, admitted to MICU for further monitoring i.s.o hemorrhagic shock 2/2 GI bleed. On repeat EGD, oozing but no active bleeding was noted. Hospital course c/b c.diff, and patient has been placed on vancomycin. Patient had melanotic stool 7/18 with Hgb drop 10.1 to 7.1 and received 2 units pRBC and 1 unit platelets. Patient was reintubated and underwent EGD 7/18 afternoon. GI found duodenal bleed. Patient now s/p 3x clips and epi. Since 7/18 patient has continued to have melanotic stools with hemoglobin drops concerning for slow rebleeding. Currently, discussion revolves around the risks and benefits of antiplatelet therapy for recent stent vs risk of GI rebleed, and potential to undergo surgery.    - Total transfusions: 17 pRBC, 4 platelet, 5 FFP (as of 7/28)    Plan:     NEUROLOGIC  # Baseline AOx3 (waxing/waning)  Course:  - intubated 7/9, extubated 7/15  - reintubated 7/18 for EGD, extubated 7/22  -reintubated 7/23, extubated  - not sedated  - Currently AOx2-3  Plan:  - Delirium precautions    CARDIOVASCULAR  # hemorrhagic shock (worsened)  Course:  - Per spouse, pt's baseline BP is a little bit over 100  - 7/9: RRT called 7/9 for hypotension; 2/2 to Upper GI bleeds, urgently took to EGD, found bleeding ulcer that was not well controlled, underwent IR GDA embolization  - 7/18, patient had large melanotic stool with Hgb drop from 10.1-7.1, patient was restarted on henny, vaso, and levo for reintubation for GI scope; s/p 2pRBC, 1 platelet  - Total transfusions: 16 pRBC, 4 platelet, 5 FFP  Findings:  - AM cortisol 17 (7/5), AM cortisol 37.1 (7/11)  - POCUS ECHO (7/16) showed no cardiogenic shock, no tamponade, low SV indeterminate IVC, irregular B lines but not concerning for pulmonary edema, and some subdiaphragmatic fluid  Plan:  - Continue to monitor CBC, transfuse for Hgb <7  - Continue to hold Plavix 75 mg  - Received midodrine 10 once on 7/30 prior to pulling NGT    #CAD s/p PCI x3, most recent stent 6/12/24, NURIA to pLAD (stable)  Course:  - 7/13 restarted plavix for new stent (6/12/24) per GI, 7/18 held plavix due to bleeding  - 7/22 started low-dose aspirin as safer alternative to plavix per cardiology, but d/c 7/23 as patient bled again  Plan:  - Continue to hold Plavix 75 mg and aspirin 81 mg      #PAD  # A-fib on eliquis (worsening)  May have been worsened because of GI bleed  - s/p successful DCCV 10/31   - 7/16 In RVR, 2 doses of amio overnight; Cards recommended resuming home dose sotalol 40 mg PO (qTC wnl), s/p 1 dose sotalol but RVR persisted  - 7/16 Started amio loading  - 7/22 Added digoxin load for persistent HR 130s over past week  - 7/23 Dig level 1.3, patient's HR back in 60s in the afternoon  - 7/28 Patient pulled NG tube, unable to get several doses of amio, back in afib with RVR  - 7/30 NGT replaced - will restart amio  Plan:  - Hold eliquis   - Continue digoxin maintenance dose 125 mcg daily  - Hold home sotalol  - Cardiology following, advises against chemical cardioversion off AC if possible  - digoxin lvl 1.3  - Start digoxin 62.5 micrograms IV push daily   - Repeat digoxin level on 8/3    #NSVT (stable)  Likely 2/2 cardiac structural damage given prior stents as well as electrolyte abnormalities while being diuresed. Troponins elevated but stable today. Troponins also elevated earlier in admission. EKG without ST elevations.  Plan:  - Continue to monitor  - Replete electrolytes as necessary    PULMONARY  #Intubated for airway protection prior to EGD/IR embolization (resolved)  Course:  - Extubated 7/22, SpO2 100% on RA, reintubated 7/27, Extubated 7/28    RENAL/  #RAZIA (resolved)  - Cr peaked at 3.38 (7/11)  - Currently Cr 1.13 ( 7/24)  Plan:  - Continue to monitor Cr    #ATN i.s.o hypotension  #metabolic acidosis (improving)  Patient was in a state of metabolic acidosis around 7/16, but recent ABG pH in normal range. However, patient has been bicarb deficient throughout the admission.   Plan:  - f/u nephro recs  - hold sodium bicarb 650mg tid considering improvement in hypernatremia and bicarb lvls  - Per nephro, patient is not a dialysis candidate  - trend BUN/Cr   - monitor Is and Os     #Hypernatremia (improving)  Patient having a gradually increasing sodium since transfer to the MICU. Na was improving by adding free water, lowering the bicarb drip, and diuresis with bumex/metolazone. Now downtrending, will hold diuresis.  Plan:  - stopped free water 300mL q6  - hold sodium bicarb 650mg TID given improvement  - hold bumex 1mg q12 IV, metolazone 5mg PO QD    #Urinary retention (improving)  Plan:  - Started Doxazosin 2 mg daily (7/15)  - Monitor I/Os  - Tavarez placed by urology 7/19, making urine  - Removed tavarez 7/30    GASTROINTESTINAL  #Upper GI Bleed (stable)  Course:  - 7/9 CT A/P - Active bleeding in the third portion of the duodenum. Progression of liver metastases.  - 7/9 EGD showed esophagitis, One non-bleeding duodenal ulcer with a clean ulcer base (Arjun Class III). One oozing duodenal ulcer with spurting hemorrhage (Arjun Class Ia). Treatment not successful. Treated with bipolar cautery.  - 7/9 s/p IR GDA embolization  - 7/18 Patient had melanotic stool with hemoglobin drop 10.1 to 7.1; EGD showing duodenal bleed s/p 3 clips + epi  - CTA (7/20) showed no active bleed  - H. pylori negative  - Total EGDs: 3  Plan:  - Continue pantoprazole 40 mg IV q12  - monitor Hgb, transfuse as needed for Hgb <7  - continue to f/u with GI and IR     - Per GI, "Will need PPI BID for 8 weeks for grade D esophagitis and PUD"    - Per GI, high risk of 30 day rebleed (>10-20% risk)  - No acute IR intervention per IR because no active area of extravasation on imaging (7/21)  - No acute GI intervention per GI (7/21)  - Surgery evaluated patient 7/23, may be a candidate for duodenotomy to oversew ulcers, but patient cannot be on aspirin  - Reached out to GI (7/26) about longer-term goals including risk of not being on aspirin vs bleeding;  ·	Per GI, still a risks vs benefits situation as he may bleed again with ASA, patient may require a surgical intervention next  ·	As per GI, started on octreotide 250mg IV TID    #Nutrition  Course:  - FEES (7/17) failed  - ENT consulted for vallecular lesion, diagnosed vallecular cyst, no inpatient intervention required, outpatient f/u  - Patient currently extubated (7/24)  - FEES failed 7/29; patient refused evaluation  - NGT replaced (7/30)- was given 1mg ativan, nasal lidocaine gel, affrin prior to NGT insertion  - Pt Pulled NGT 7/30 at 22:00, third time, no plan to replace    Plan:  - No plan to replace, update wife, discuss GOC    #Diarrhea (resolved)  Course:  - c. diff + (7/14)  - vancomycin (7/14 -7/23) active infection dose, vancomycin prophylaxis (7/24-7/25)   Plan:  - No longer on vancomycin  - Infection prevention says contact precautions continued due to mucoid BMs    #Transaminitis (worsened)  Findings:  - Bili 1.1 (7/16) -> 1.3 (7/17) ->0.5 (7/24) -> 0.6 (7/28)  - Alk P 359 (7/16)-> 484 (7/17) -> 174 (7/24) -> 229 (7/28)  -  (7/16)->129 (7/17) -> 75 (7/24) -> 65 (7/28)  - ALT 67 (7/16)-> 72 (7/17) -> 30 (7/24) -> 229 (7/28)  - RUQ US (7/17): known liver mets, no acute findings  Plan:  - Continue atorvastatin 80 mg daily    INFECTIOUS DISEASE  #leukocytosis (resolved)  Cultures:  - Blood cultures negative throughout admission  - Sputum Cx from ETT 7/13 showed numerous gram neg krishna (Klebsiella oxytoca/raoultella ornithinolytica)  - GI PCR negative  - MRSA swab negative  - 7/29 sputum culture growing P aeruginosa   Antibiotics Course:  - Vancomycin (7/14 - 7/23), Vancomycin Prophylaxis (7/24 -7/25)  - Zosyn (7/11-7/18), resistance shown, switched to meropenem  - Meropenem (7/18-7/25)  Plan:  - No longer on antibiotics  - Continue to monitor for fevers  - If febrile start abx for pseudomonas    ENDOCRINE  #adrenal insufficiency (stable)  Findings:  - 7/5 cortisol 17   - 7/11 cortisol 37.1   Plan:  - Taper hydrocortisone 25mg QD for 2 days  - Monitor sugars with steroid    HEMATOLOGY/ONCOLOGY   # neuroendocrine tumor (unchanged)  - was on lanreotide then had POD in liver and started on peptide receptor radiotherapy (PRRT)- typically given every 8 weeks for 4 doses. He last received it 10/20/2023   - PET 5/28/24 shows new somatostatin avid osseous lesions; decreased intensely avid right supradiaphragmatic and upper abdominal nodes, suspicious for mets  - Gastrin level 5509  Plan:  - per heme/onc:    - can resume octreotide 150 mcg bid once infection r/o      - can check factor levels V, VIII, X     - "[place] [facility] [place] plan for treatment inpatient, if clinically improves/stabilizes then can be considered for treatment outpatient"    #Normocytic anemia (stable)  - Pt with low Hgb 7-9  - ISO of GI bleed hx and continued melanotic BMs, there is c/o of rebleed; per GI, high risk of rebleeding, currently seems to be slow bleeding   Plan:  - Surgery and GI made aware & are following   - Continue CBC q12 unless having large melanotic stools    #DVT ppx (unchanged)  - SCDs  - LE duplex (7/17): negative for DVT    SKINS:   - Lines/Tubes - PIV, cordis replaced with central line (7/14-7/23), A line (7/20-7/29), Subclavian central line (7/24- )  - Removed A-line (R axillary) 7/29  - Removed tavarez 7/30, monitor urine output  - 2x peripheral IV right arm    Ethics:   - Full Code   - GOC 7/12, spoke to spouse (Yamilet) over phone with palliative care team, discussed GOC again on 7/16  - 7/29 - Patient expressed that he wants to die, refuses to see psych, equivocating on DNR status.   - Pulled NGT for 3rd time on 7/30  - Continue GOC discussion.    Consults this admission: GI, Heme Onc, Palliative, Endocrinology, Cardiology, Nephrology, Interventional Radiology, General Surgery   Assessment	  Assessment: 77 yo male with metastatic neuroendocrine pancreatic cancer (tx on hold) and PMH of CAD s/p PCI x3 with most recent stent 6/12/24 on Plavix and eliquis , chronic Afib on eliquis, orthostatic hypotension on midodrine, PAD , recent admission for dx cath on 6/24/24 presented from PCP's office for hypotension despite taking AM midodrine dose on 7/2, admitted to medicine for symptomatic hypotension and RAZIA. Per chart, on 7/8 PM, pt was noted to have acute onset of melena x5 and coffee ground emesis x2-3. RRT was called on 7/9 AM for hypotension, hgb dropped from 9.2 to 7.4 (admission baseline 10-11), FOBT +, pt was started on PPI IV and transfused 1 unit of pRBC. GI was consulted and underwent EGD on 7/9 afternoon. MICU was consulted for uncontrolled bleeding during EGD. During EGD, pt became hypotensive and was given phenylephrine, had A-fib with RVR s/p amiodarone. Now s/p IR GDA embolization, admitted to MICU for further monitoring i.s.o hemorrhagic shock 2/2 GI bleed. On repeat EGD, oozing but no active bleeding was noted. Hospital course c/b c.diff, and patient has been placed on vancomycin. Patient had melanotic stool 7/18 with Hgb drop 10.1 to 7.1 and received 2 units pRBC and 1 unit platelets. Patient was reintubated and underwent EGD 7/18 afternoon. GI found duodenal bleed. Patient now s/p 3x clips and epi. Since 7/18 patient has continued to have melanotic stools with hemoglobin drops concerning for slow rebleeding. Currently, discussion revolves around the risks and benefits of antiplatelet therapy for recent stent vs risk of GI rebleed, and potential to undergo surgery.    - Total transfusions: 17 pRBC, 4 platelet, 5 FFP (as of 7/28)    Plan:     NEUROLOGIC  # Baseline AOx3 (waxing/waning)  Course:  - intubated 7/9, extubated 7/15  - reintubated 7/18 for EGD, extubated 7/22  -reintubated 7/23, extubated  - not sedated  - Currently AOx2-3  Plan:  - Delirium precautions    CARDIOVASCULAR  # hemorrhagic shock (worsened)  Course:  - Per spouse, pt's baseline BP is a little bit over 100  - 7/9: RRT called 7/9 for hypotension; 2/2 to Upper GI bleeds, urgently took to EGD, found bleeding ulcer that was not well controlled, underwent IR GDA embolization  - 7/18, patient had large melanotic stool with Hgb drop from 10.1-7.1, patient was restarted on henny, vaso, and levo for reintubation for GI scope; s/p 2pRBC, 1 platelet  - Total transfusions: 16 pRBC, 4 platelet, 5 FFP  Findings:  - AM cortisol 17 (7/5), AM cortisol 37.1 (7/11)  - POCUS ECHO (7/16) showed no cardiogenic shock, no tamponade, low SV indeterminate IVC, irregular B lines but not concerning for pulmonary edema, and some subdiaphragmatic fluid  Plan:  - Continue to monitor CBC, transfuse for Hgb <7  - Continue to hold Plavix 75 mg  - Received midodrine 10 once on 7/30 prior to pulling NGT    #CAD s/p PCI x3, most recent stent 6/12/24, NURIA to pLAD (stable)  Course:  - 7/13 restarted plavix for new stent (6/12/24) per GI, 7/18 held plavix due to bleeding  - 7/22 started low-dose aspirin as safer alternative to plavix per cardiology, but d/c 7/23 as patient bled again  Plan:  - Continue to hold Plavix 75 mg and aspirin 81 mg      #PAD  # A-fib on eliquis (worsening)  May have been worsened because of GI bleed  - s/p successful DCCV 10/31   - 7/16 In RVR, 2 doses of amio overnight; Cards recommended resuming home dose sotalol 40 mg PO (qTC wnl), s/p 1 dose sotalol but RVR persisted  - 7/16 Started amio loading  - 7/22 Added digoxin load for persistent HR 130s over past week  - 7/23 Dig level 1.3, patient's HR back in 60s in the afternoon  - 7/28 Patient pulled NG tube, unable to get several doses of amio, back in afib with RVR  - 7/30 NGT replaced - was pulled out later in the PM  Plan:  - Hold eliquis   - Continue digoxin maintenance dose 125 mcg daily  - Hold home sotalol  - Cardiology following, advises against chemical cardioversion off AC if possible  - digoxin lvl 1.3  - Start digoxin 62.5 micrograms IV push daily   - Repeat digoxin level on 8/3    #NSVT (stable)  Likely 2/2 cardiac structural damage given prior stents as well as electrolyte abnormalities while being diuresed. Troponins elevated but stable today. Troponins also elevated earlier in admission. EKG without ST elevations.  Plan:  - Continue to monitor  - Replete electrolytes as necessary    PULMONARY  #Intubated for airway protection prior to EGD/IR embolization (resolved)  Course:  - Extubated 7/22, SpO2 100% on RA, reintubated 7/27, Extubated 7/28    RENAL/  #RAZIA (resolved)  - Cr peaked at 3.38 (7/11)  - Currently Cr 1.13 ( 7/24)  Plan:  - Continue to monitor Cr    #ATN i.s.o hypotension  #metabolic acidosis (improving)  Patient was in a state of metabolic acidosis around 7/16, but recent ABG pH in normal range. However, patient has been bicarb deficient throughout the admission.   Plan:  - f/u nephro recs  - hold sodium bicarb 650mg tid considering improvement in hypernatremia and bicarb lvls  - Per nephro, patient is not a dialysis candidate  - trend BUN/Cr   - monitor Is and Os     #Hypernatremia (improving)  Patient having a gradually increasing sodium since transfer to the MICU. Na was improving by adding free water, lowering the bicarb drip, and diuresis with bumex/metolazone. Now downtrending, will hold diuresis.  Plan:  - stopped free water 300mL q6  - hold sodium bicarb 650mg TID given improvement  - hold bumex 1mg q12 IV, metolazone 5mg PO QD    #Urinary retention (improving)  Plan:  - Started Doxazosin 2 mg daily (7/15)  - Monitor I/Os  - Tavarez placed by urology 7/19, making urine  - Removed tavarez 7/30    GASTROINTESTINAL  #Upper GI Bleed (stable)  Course:  - 7/9 CT A/P - Active bleeding in the third portion of the duodenum. Progression of liver metastases.  - 7/9 EGD showed esophagitis, One non-bleeding duodenal ulcer with a clean ulcer base (Arjun Class III). One oozing duodenal ulcer with spurting hemorrhage (Arjun Class Ia). Treatment not successful. Treated with bipolar cautery.  - 7/9 s/p IR GDA embolization  - 7/18 Patient had melanotic stool with hemoglobin drop 10.1 to 7.1; EGD showing duodenal bleed s/p 3 clips + epi  - CTA (7/20) showed no active bleed  - H. pylori negative  - Total EGDs: 3  Plan:  - Continue pantoprazole 40 mg IV q12  - monitor Hgb, transfuse as needed for Hgb <7  - continue to f/u with GI and IR     - Per GI, "Will need PPI BID for 8 weeks for grade D esophagitis and PUD"    - Per GI, high risk of 30 day rebleed (>10-20% risk)  - No acute IR intervention per IR because no active area of extravasation on imaging (7/21)  - No acute GI intervention per GI (7/21)  - Surgery evaluated patient 7/23, may be a candidate for duodenotomy to oversew ulcers, but patient cannot be on aspirin  - Reached out to GI (7/26) about longer-term goals including risk of not being on aspirin vs bleeding;  ·	Per GI, still a risks vs benefits situation as he may bleed again with ASA, patient may require a surgical intervention next  ·	As per GI, started on octreotide 250mg IV TID    #Nutrition  Course:  - FEES (7/17) failed  - ENT consulted for vallecular lesion, diagnosed vallecular cyst, no inpatient intervention required, outpatient f/u  - Patient currently extubated (7/24)  - FEES failed 7/29; patient refused evaluation  - NGT replaced (7/30)- was given 1mg ativan, nasal lidocaine gel, affrin prior to NGT insertion  - Pt Pulled NGT 7/30 at 22:00, third time, no plan to replace    Plan:  - No plan to replace, update wife, discuss GOC    #Diarrhea (resolved)  Course:  - c. diff + (7/14)  - vancomycin (7/14 -7/23) active infection dose, vancomycin prophylaxis (7/24-7/25)   Plan:  - No longer on vancomycin  - Infection prevention says contact precautions continued due to mucoid BMs    #Transaminitis (worsened)  Findings:  - Bili 1.1 (7/16) -> 1.3 (7/17) ->0.5 (7/24) -> 0.6 (7/28)  - Alk P 359 (7/16)-> 484 (7/17) -> 174 (7/24) -> 229 (7/28)  -  (7/16)->129 (7/17) -> 75 (7/24) -> 65 (7/28)  - ALT 67 (7/16)-> 72 (7/17) -> 30 (7/24) -> 229 (7/28)  - RUQ US (7/17): known liver mets, no acute findings  Plan:  - Continue atorvastatin 80 mg daily    INFECTIOUS DISEASE  #leukocytosis (resolved)  Cultures:  - Blood cultures negative throughout admission  - Sputum Cx from ETT 7/13 showed numerous gram neg krishna (Klebsiella oxytoca/raoultella ornithinolytica)  - GI PCR negative  - MRSA swab negative  - 7/29 sputum culture growing P aeruginosa   Antibiotics Course:  - Vancomycin (7/14 - 7/23), Vancomycin Prophylaxis (7/24 -7/25)  - Zosyn (7/11-7/18), resistance shown, switched to meropenem  - Meropenem (7/18-7/25)  Plan:  - No longer on antibiotics  - Continue to monitor for fevers  - If febrile start abx for pseudomonas    ENDOCRINE  #adrenal insufficiency (stable)  Findings:  - 7/5 cortisol 17   - 7/11 cortisol 37.1   Plan:  - Taper hydrocortisone 25mg QD for 2 days  - Monitor sugars with steroid    HEMATOLOGY/ONCOLOGY   # neuroendocrine tumor (unchanged)  - was on lanreotide then had POD in liver and started on peptide receptor radiotherapy (PRRT)- typically given every 8 weeks for 4 doses. He last received it 10/20/2023   - PET 5/28/24 shows new somatostatin avid osseous lesions; decreased intensely avid right supradiaphragmatic and upper abdominal nodes, suspicious for mets  - Gastrin level 5509  Plan:  - per heme/onc:    - can resume octreotide 150 mcg bid once infection r/o      - can check factor levels V, VIII, X     - "f/u with Dr. Sophia Prasad at [facility] [place] after discharge, no plan for treatment inpatient, if clinically improves/stabilizes then can be considered for treatment outpatient"    #Normocytic anemia (stable)  - Pt with low Hgb 7-9  - ISO of GI bleed hx and continued melanotic BMs, there is c/o of rebleed; per GI, high risk of rebleeding, currently seems to be slow bleeding   Plan:  - Surgery and GI made aware & are following   - Continue CBC q12 unless having large melanotic stools    #DVT ppx (unchanged)  - SCDs  - LE duplex (7/17): negative for DVT    SKINS:   - Lines/Tubes - PIV, cordis replaced with central line (7/14-7/23), A line (7/20-7/29), Subclavian central line (7/24- )  - Removed A-line (R axillary) 7/29  - Removed tavarez 7/30, monitor urine output  - 2x peripheral IV right arm    Ethics:   - Full Code   - GOC 7/12, spoke to spouse (Yamilet) over phone with palliative care team, discussed GOC again on 7/16  - 7/29 - Patient expressed that he wants to die, refuses to see psych, equivocating on DNR status.   - Pulled NGT for 3rd time on 7/30  - Continue GOC discussion.    Consults this admission: GI, Heme Onc, Palliative, Endocrinology, Cardiology, Nephrology, Interventional Radiology, General Surgery   Assessment	  Assessment: 77 yo male with metastatic neuroendocrine pancreatic cancer (tx on hold) and PMH of CAD s/p PCI x3 with most recent stent 6/12/24 on Plavix and eliquis , chronic Afib on eliquis, orthostatic hypotension on midodrine, PAD , recent admission for dx cath on 6/24/24 presented from PCP's office for hypotension despite taking AM midodrine dose on 7/2, admitted to medicine for symptomatic hypotension and RAZIA. Per chart, on 7/8 PM, pt was noted to have acute onset of melena x5 and coffee ground emesis x2-3. RRT was called on 7/9 AM for hypotension, hgb dropped from 9.2 to 7.4 (admission baseline 10-11), FOBT +, pt was started on PPI IV and transfused 1 unit of pRBC. GI was consulted and underwent EGD on 7/9 afternoon. MICU was consulted for uncontrolled bleeding during EGD. During EGD, pt became hypotensive and was given phenylephrine, had A-fib with RVR s/p amiodarone. Now s/p IR GDA embolization, admitted to MICU for further monitoring i.s.o hemorrhagic shock 2/2 GI bleed. On repeat EGD, oozing but no active bleeding was noted. Hospital course c/b c.diff, and patient has been placed on vancomycin. Patient had melanotic stool 7/18 with Hgb drop 10.1 to 7.1 and received 2 units pRBC and 1 unit platelets. Patient was reintubated and underwent EGD 7/18 afternoon. GI found duodenal bleed. Patient now s/p 3x clips and epi. Since 7/18 patient has continued to have melanotic stools with hemoglobin drops concerning for slow rebleeding. Currently, discussion revolves around the risks and benefits of antiplatelet therapy for recent stent vs risk of GI rebleed, and potential to undergo surgery.    - Total transfusions: 17 pRBC, 4 platelet, 5 FFP (as of 7/28)    Plan:     NEUROLOGIC  # Baseline AOx3 (waxing/waning)  Course:  - intubated 7/9, extubated 7/15  - reintubated 7/18 for EGD, extubated 7/22  -reintubated 7/23, extubated  - not sedated  - Currently AOx2-3  Plan:  - Delirium precautions    CARDIOVASCULAR  # hemorrhagic shock (worsened)  Course:  - Per spouse, pt's baseline BP is a little bit over 100  - 7/9: RRT called 7/9 for hypotension; 2/2 to Upper GI bleeds, urgently took to EGD, found bleeding ulcer that was not well controlled, underwent IR GDA embolization  - 7/18, patient had large melanotic stool with Hgb drop from 10.1-7.1, patient was restarted on henny, vaso, and levo for reintubation for GI scope; s/p 2pRBC, 1 platelet  - Total transfusions: 16 pRBC, 4 platelet, 5 FFP  Findings:  - AM cortisol 17 (7/5), AM cortisol 37.1 (7/11)  - POCUS ECHO (7/16) showed no cardiogenic shock, no tamponade, low SV indeterminate IVC, irregular B lines but not concerning for pulmonary edema, and some subdiaphragmatic fluid  Plan:  - Continue to monitor CBC, transfuse for Hgb <7  - Continue to hold Plavix 75 mg  - Received midodrine 10 once on 7/30 prior to pulling NGT    #CAD s/p PCI x3, most recent stent 6/12/24, NURIA to pLAD (stable)  Course:  - 7/13 restarted plavix for new stent (6/12/24) per GI, 7/18 held plavix due to bleeding  - 7/22 started low-dose aspirin as safer alternative to plavix per cardiology, but d/c 7/23 as patient bled again  Plan:  - Continue to hold Plavix 75 mg and aspirin 81 mg      #PAD  # A-fib on eliquis (worsening)  May have been worsened because of GI bleed  - s/p successful DCCV 10/31   - 7/16 In RVR, 2 doses of amio overnight; Cards recommended resuming home dose sotalol 40 mg PO (qTC wnl), s/p 1 dose sotalol but RVR persisted  - 7/16 Started amio loading  - 7/22 Added digoxin load for persistent HR 130s over past week  - 7/23 Dig level 1.3, patient's HR back in 60s in the afternoon  - 7/28 Patient pulled NG tube, unable to get several doses of amio, back in afib with RVR  - 7/30 NGT replaced - was pulled out later in the PM  Plan:  - Hold eliquis   - Hold home sotalol  - Cardiology following, advises against chemical cardioversion off AC if possible  - digoxin lvl 1.3  - Start digoxin 62.5 micrograms IV push daily   - Repeat digoxin level on 8/3    #NSVT (stable)  Likely 2/2 cardiac structural damage given prior stents as well as electrolyte abnormalities while being diuresed. Troponins elevated but stable today. Troponins also elevated earlier in admission. EKG without ST elevations.  Plan:  - Continue to monitor  - Replete electrolytes as necessary    PULMONARY  #Intubated for airway protection prior to EGD/IR embolization (resolved)  Course:  - Extubated 7/22, SpO2 100% on RA, reintubated 7/27, Extubated 7/28    RENAL/  #RAZIA (resolved)  - Cr peaked at 3.38 (7/11)  - Currently Cr 1.13 ( 7/24)  Plan:  - Continue to monitor Cr    #ATN i.s.o hypotension  #metabolic acidosis (improving)  Patient was in a state of metabolic acidosis around 7/16, but recent ABG pH in normal range. However, patient has been bicarb deficient throughout the admission.   Plan:  - f/u nephro recs  - hold sodium bicarb 650mg tid considering improvement in hypernatremia and bicarb lvls  - Per nephro, patient is not a dialysis candidate  - trend BUN/Cr   - monitor Is and Os     #Hypernatremia (improving)  Patient having a gradually increasing sodium since transfer to the MICU. Na was improving by adding free water, lowering the bicarb drip, and diuresis with bumex/metolazone. Now downtrending, will hold diuresis.  Plan:  - stopped free water 300mL q6  - hold sodium bicarb 650mg TID given improvement  - hold bumex 1mg q12 IV, metolazone 5mg PO QD    #Urinary retention (improving)  Plan:  - Started Doxazosin 2 mg daily (7/15)  - Monitor I/Os  - Tavarez placed by urology 7/19, making urine  - Removed tavarez 7/30    GASTROINTESTINAL  #Upper GI Bleed (stable)  Course:  - 7/9 CT A/P - Active bleeding in the third portion of the duodenum. Progression of liver metastases.  - 7/9 EGD showed esophagitis, One non-bleeding duodenal ulcer with a clean ulcer base (Arjun Class III). One oozing duodenal ulcer with spurting hemorrhage (Arjun Class Ia). Treatment not successful. Treated with bipolar cautery.  - 7/9 s/p IR GDA embolization  - 7/18 Patient had melanotic stool with hemoglobin drop 10.1 to 7.1; EGD showing duodenal bleed s/p 3 clips + epi  - CTA (7/20) showed no active bleed  - H. pylori negative  - Total EGDs: 3  Plan:  - Continue pantoprazole 40 mg IV q12  - monitor Hgb, transfuse as needed for Hgb <7  - continue to f/u with GI and IR     - Per GI, "Will need PPI BID for 8 weeks for grade D esophagitis and PUD"    - Per GI, high risk of 30 day rebleed (>10-20% risk)  - No acute IR intervention per IR because no active area of extravasation on imaging (7/21)  - No acute GI intervention per GI (7/21)  - Surgery evaluated patient 7/23, may be a candidate for duodenotomy to oversew ulcers, but patient cannot be on aspirin  - Reached out to GI (7/26) about longer-term goals including risk of not being on aspirin vs bleeding;  ·	Per GI, still a risks vs benefits situation as he may bleed again with ASA, patient may require a surgical intervention next  - As per heme/onc, c/w octreotide 250mg IV TID    #Nutrition  Course:  - FEES (7/17) failed  - ENT consulted for vallecular lesion, diagnosed vallecular cyst, no inpatient intervention required, outpatient f/u  - Patient currently extubated (7/24)  - FEES failed 7/29; patient refused evaluation  - NGT replaced (7/30)- was given 1mg ativan, nasal lidocaine gel, affrin prior to NGT insertion  - Pt Pulled NGT 7/30 at 22:00, third time, no plan to replace    Plan:  - No plan to replace, update wife, discuss GOC    #Diarrhea (resolved)  Course:  - c. diff + (7/14)  - vancomycin (7/14 -7/23) active infection dose, vancomycin prophylaxis (7/24-7/25)   Plan:  - No longer on vancomycin  - Infection prevention says contact precautions continued due to mucoid BMs    #Transaminitis (worsened)  Findings:  - Bili 1.1 (7/16) -> 1.3 (7/17) ->0.5 (7/24) -> 0.6 (7/28)  - Alk P 359 (7/16)-> 484 (7/17) -> 174 (7/24) -> 229 (7/28)  -  (7/16)->129 (7/17) -> 75 (7/24) -> 65 (7/28)  - ALT 67 (7/16)-> 72 (7/17) -> 30 (7/24) -> 229 (7/28)  - RUQ US (7/17): known liver mets, no acute findings  Plan:  - Continue atorvastatin 80 mg daily    INFECTIOUS DISEASE  #leukocytosis (resolved)  Cultures:  - Blood cultures negative throughout admission  - Sputum Cx from ETT 7/13 showed numerous gram neg krishna (Klebsiella oxytoca/raoultella ornithinolytica)  - GI PCR negative  - MRSA swab negative  - 7/29 sputum culture growing P aeruginosa   Antibiotics Course:  - Vancomycin (7/14 - 7/23), Vancomycin Prophylaxis (7/24 -7/25)  - Zosyn (7/11-7/18), resistance shown, switched to meropenem  - Meropenem (7/18-7/25)  Plan:  - No longer on antibiotics  - Continue to monitor for fevers  - If febrile start abx for pseudomonas    ENDOCRINE  #adrenal insufficiency (stable)  Findings:  - 7/5 cortisol 17   - 7/11 cortisol 37.1   Plan:  - Taper hydrocortisone 25mg QD for 2 days  - Monitor sugars with steroid    HEMATOLOGY/ONCOLOGY   # neuroendocrine tumor (unchanged)  - was on lanreotide then had POD in liver and started on peptide receptor radiotherapy (PRRT)- typically given every 8 weeks for 4 doses. He last received it 10/20/2023   - PET 5/28/24 shows new somatostatin avid osseous lesions; decreased intensely avid right supradiaphragmatic and upper abdominal nodes, suspicious for mets  - Gastrin level 5509  Plan:  - per heme/onc:     - can check factor levels V, VIII, X     - "f/u with Dr. Sophia Prasad at [facility] [place], no plan for treatment inpatient, if clinically improves/stabilizes then can be considered for treatment outpatient"     - c/w octreotide 250 mcg TID    #Normocytic anemia (stable)  - Pt with low Hgb 7-9  - ISO of GI bleed hx and continued melanotic BMs, there is c/o of rebleed; per GI, high risk of rebleeding, currently seems to be slow bleeding   Plan:  - Surgery and GI made aware & are following   - Continue CBC q12 unless having large melanotic stools    #DVT ppx (unchanged)  - SCDs  - LE duplex (7/17): negative for DVT    SKINS:   - Lines/Tubes - PIV, cordis replaced with central line (7/14-7/23), A line (7/20-7/29), Subclavian central line (7/24- )  - Removed A-line (R axillary) 7/29  - Removed tavarez 7/30, monitor urine output  - 2x peripheral IV right arm    Ethics:   - Full Code   - GOC 7/12, spoke to spouse (Yamilet) over phone with palliative care team, discussed GOC again on 7/16  - 7/29 - Patient expressed that he wants to die, refuses to see psych, equivocating on DNR status.   - Pulled NGT for 3rd time on 7/30  - Continue GOC discussion.    Consults this admission: GI, Heme Onc, Palliative, Endocrinology, Cardiology, Nephrology, Interventional Radiology, General Surgery

## 2024-07-31 NOTE — CHART NOTE - NSCHARTNOTEFT_GEN_A_CORE
NUTRITION FOLLOW UP NOTE    PATIENT SEEN FOR: MICU follow up.     SOURCE: [] Patient  [x] Current Medical Record  [x] RN  [] Family/support person at bedside  [x] Patient unavailable/inappropriate  [] Other:     CHART REVIEWED/EVENTS NOTED.  [] No changes to nutrition care plan to note  [x] Nutrition Status:  - Extubated   - Not sedated  - Seen by speech pathologist  for FEES, however Pt unable to tolerate scope  - Pulled out NGT , pt is willing to have NGT tube replaced "later" today   - Active c. diff infection   - Metastatic neuroendocrine pancreatic cancer     DIET ORDER:   Diet, NPO:   Except Medications (- @ 16:57) [Active]      CURRENT DIET ORDER IS:  [] Appropriate:  [] Inadequate:  [x] Other: See recommendations below     NUTRITION INTAKE/PROVISION:  [] PO:   [x] Enteral Nutrition:  Vital 1.5 @ 65ml/hr X 18 hours (-)  Trickle feeds -  NPO ->  [] Parenteral Nutrition:    ANTHROPOMETRICS:  Drug Dosing Weight  Height (cm): 188 (2024 13:27)  Weight (kg): 125.6 (2024 00:00); previously 98.7 kg ()  BMI (kg/m2): 35.5 (2024 00:00)    Weights:   Daily Weight in k.4 (-), 140.3 (-), 125.6 (-), 120.1 ()   Increase in wt likely fluid retention (anai diuresed). RD will continue to trend as new wts available/able.     NUTRITIONALLY PERTINENT MEDICATIONS (STANDING):  MEDICATIONS  (STANDING):  atorvastatin 80 milliGRAM(s) Oral at bedtime  digoxin     Tablet 125 MICROGram(s) Oral daily  doxazosin 2 milliGRAM(s) Oral at bedtime  folic acid Injectable 1 milliGRAM(s) IV Push daily  insulin lispro (ADMELOG) corrective regimen sliding scale   SubCutaneous every 6 hours  midodrine 10 milliGRAM(s) Oral every 8 hours  multivitamin 1 Tablet(s) Oral daily  octreotide  Injectable 250 MICROGram(s) IV Push three times a day  pantoprazole  Injectable 40 milliGRAM(s) IV Push every 12 hours  potassium chloride  20 mEq/100 mL IVPB 20 milliEquivalent(s) IV Intermittent every 4 hours  thiamine Injectable 100 milliGRAM(s) IV Push daily    MEDICATIONS  (PRN):  sodium chloride 0.9% lock flush 10 milliLiter(s) IV Push every 1 hour PRN Pre/post blood products, medications, blood draw, and to maintain line patency      NUTRITIONALLY PERTINENT LABS:   @ 00:25: Na 145, BUN 28<H>, Cr 0.85, <H>, K+ 3.5, Phos 2.5, Mg 1.8, Alk Phos 179<H>, ALT/SGPT 26, AST/SGOT 39, HbA1c --   @ 11:45: Na 144, BUN 29<H>, Cr 0.84, <H>, K+ 2.8<LL>, Phos 3.0, Mg 1.9, Alk Phos --, ALT/SGPT --, AST/SGOT --, HbA1c --    A1C with Estimated Average Glucose Result: 6.4 % (07-15-24 @ 09:38)    Finger Sticks:  POCT Blood Glucose.: 124 mg/dL (24 @ 05:26)  POCT Blood Glucose.: 125 mg/dL (24 @ 18:31)  POCT Blood Glucose.: 126 mg/dL (24 @ 11:22)    NUTRITIONALLY PERTINENT MEDICATIONS/LABS:  [x] Reviewed  [x] Relevant notes on medications/labs:  - Hypernatremia (resolved)  - Low K+ - repleted (ordered for potassium chloride)  - Ordered for Multivitamin, thiamine, folic acid  - sliding scale of insulin; on steroid  during this admission which can elevate BG    EDEMA:  [x] Reviewed: +4 generalized  [] Relevant notes:    GI/ I&O:  [x] Reviewed  [x] Relevant notes: Last BM  - small melena bm  [x] Other: Extensive GI Hx, see notes    SKIN:   [] No pressure injuries documented, per nursing flowsheet  [x] Pressure injury previously noted: Suspected deep tissue injury Right medial back  [] Change in pressure injury documentation:   [] Other:    ESTIMATED NEEDS:  Based on previous dosing wt 98.7 kg ()  Energy: (23-28 kcal/kg) 2270 - 2764 kcal/day  Protein: (1.2-1.4 g/kg) 118 - 138 g/day   Fluid needs deferred to provider.     NUTRITION DIAGNOSIS:  [x] Prior Dx: 1) Severe chronic malnutrition 2) Increased Nutrient Needs  [] New Dx:    EDUCATION:  [] Yes:  [x] Not appropriate/warranted    NUTRITION CARE PLAN:  1. Diet: Change feeds to Vital 1.5 at 10 mL/hr increasing only as tolerated and electrolytes WNL to goal rate 65 mL/hr x24 hours with ProSource TF Free x1 daily to provide total volume 1560 mL, 2430 kcals, 120 gm protein, and 1192 mL free water. Meets 25 kcals/kG and 1.2 gm protein/kG based on previous dosing wt 98.7 kG.   -> Refeed risk given prolonged inadequate energy intake; 24 hour feeds for refeeding risk  2. Multivitamin/mineral supplementation: As medically feasible, continue to provide thiamine and add multivitamin for refeeding risk.     [] Achieved - Continue current nutrition intervention(s)  [] Current medical condition precludes nutrition intervention at this time.    MONITORING AND EVALUATION:   RD remains available upon request and will follow up per protocol.    Khadijah Vigil, MS, RD, CDN, CNSC, [facility] TEAMS NUTRITION FOLLOW UP NOTE    PATIENT SEEN FOR: MICU follow up.     SOURCE: [] Patient  [x] Current Medical Record  [x] RN  [] Family/support person at bedside  [x] Patient unavailable/inappropriate  [] Other:     CHART REVIEWED/EVENTS NOTED.  [] No changes to nutrition care plan to note  [x] Nutrition Status:  - Extubated   - Not sedated  - Seen by speech pathologist  for FEES, however Pt unable to tolerate scope  - Pulled out NGT , pt is willing to have NGT tube replaced "later" today   - Active c. diff infection   - Metastatic neuroendocrine pancreatic cancer     DIET ORDER:   Diet, NPO:   Except Medications (- @ 16:57) [Active]      CURRENT DIET ORDER IS:  [] Appropriate:  [] Inadequate:  [x] Other: See recommendations below     NUTRITION INTAKE/PROVISION:  [] PO:   [x] Enteral Nutrition:  Vital 1.5 @ 65ml/hr X 18 hours (-)  Trickle feeds (-)  NPO (->)  [] Parenteral Nutrition:    ANTHROPOMETRICS:  Drug Dosing Weight  Height (cm): 188 (2024 13:27)  Weight (kg): 125.6 (2024 00:00); previously 98.7 kg ()  BMI (kg/m2): 35.5 (2024 00:00)    Weights:   Daily Weight in k.6 ( -) Weight in k.6 ( -), Weight in k.4 (-), 140.3 (-), 125.6 (-), 120.1 ()   Increase in wt likely fluid retention (anai diuresed). RD will continue to trend as new wts available/able.     NUTRITIONALLY PERTINENT MEDICATIONS (STANDING):  atorvastatin 80 milliGRAM(s) Oral at bedtime  chlorhexidine 2% Cloths 1 Application(s) Topical <User Schedule>  chlorhexidine 4% Liquid 1 Application(s) Topical <User Schedule>  digoxin     Tablet 125 MICROGram(s) Oral daily  doxazosin 2 milliGRAM(s) Oral at bedtime  folic acid Injectable 1 milliGRAM(s) IV Push daily  insulin lispro (ADMELOG) corrective regimen sliding scale   SubCutaneous every 6 hours  midodrine 10 milliGRAM(s) Oral every 8 hours  multivitamin 1 Tablet(s) Oral daily  octreotide  Injectable 250 MICROGram(s) IV Push three times a day  pantoprazole  Injectable 40 milliGRAM(s) IV Push every 12 hours  potassium chloride  20 mEq/100 mL IVPB 20 milliEquivalent(s) IV Intermittent every 4 hours  thiamine Injectable 100 milliGRAM(s) IV Push daily    MEDICATIONS  (PRN):  sodium chloride 0.9% lock flush 10 milliLiter(s) IV Push every 1 hour PRN Pre/post blood products, medications, blood draw, and to maintain line patency      NUTRITIONALLY PERTINENT LABS:   @ 00:25: Na 145, BUN 28<H>, Cr 0.85, <H>, K+ 3.5, Phos 2.5, Mg 1.8, Alk Phos 179<H>, ALT/SGPT 26, AST/SGOT 39, HbA1c --   @ 11:45: Na 144, BUN 29<H>, Cr 0.84, <H>, K+ 2.8<LL>, Phos 3.0, Mg 1.9, Alk Phos --, ALT/SGPT --, AST/SGOT --, HbA1c --    A1C with Estimated Average Glucose Result: 6.4 % (07-15-24 @ 09:38)    Finger Sticks:  POCT Blood Glucose.: 124 mg/dL (24 @ 05:26)  POCT Blood Glucose.: 125 mg/dL (24 @ 18:31)  POCT Blood Glucose.: 126 mg/dL (24 @ 11:22)    NUTRITIONALLY PERTINENT MEDICATIONS/LABS:  [x] Reviewed  [x] Relevant notes on medications/labs:  - Hypernatremia (resolved)  - Low K+ - repleted (ordered for potassium chloride)  - Ordered for Multivitamin, thiamine, folic acid  - sliding scale of insulin; on steroid  during this admission which can elevate BG    EDEMA:  [x] Reviewed: +4 generalized;+4 Dependent   [] Relevant notes:    GI/ I&O:  [x] Reviewed  [x] Relevant notes: Last BM 24  [] Other:     SKIN:   [] No pressure injuries documented, per nursing flowsheet  [x] Pressure injury previously noted: Suspected deep tissue injury Right medial back  [] Change in pressure injury documentation:   [] Other:    ESTIMATED NEEDS:  Based on previous dosing wt 98.7 kg (-)  Energy: (23-28 kcal/kg) 2270 - 2764 kcal/day  Protein: (1.2-1.4 g/kg) 118 - 138 g/day   Fluid needs deferred to provider.     NUTRITION DIAGNOSIS:  [x] Prior Dx: 1) Severe chronic malnutrition 2) Increased Nutrient Needs  [] New Dx:    EDUCATION:  [] Yes:  [x] Not appropriate/warranted    NUTRITION CARE PLAN:  1. Diet: When Medical Feasible as tolerated Vital 1.5 at 10 mL/hr increasing only as tolerated and electrolytes WNL to goal rate 65 mL/hr x24 hours with ProSource TF Free x1 daily to provide total volume 1560 mL, 2430 kcals, 120 gm protein, and 1192 mL free water. Meets 25 kcals/kG and 1.2 gm protein/kG based on previous dosing wt 98.7 kG.   -> Refeed risk given prolonged inadequate energy intake; 24 hour feeds for refeeding risk  2. Multivitamin/mineral supplementation: As medically feasible, continue to provide thiamine, folic acid and add multivitamin for refeeding risk.   3. RD remains available to adjust EN formulary, volume/rate    [] Achieved - Continue current nutrition intervention(s)  [] Current medical condition precludes nutrition intervention at this time.    MONITORING AND EVALUATION:   RD remains available upon request and will follow up per protocol.    Corina Alcocer, MS,RDN,CDN AVAILABLE ON MS TEAMS NUTRITION FOLLOW UP NOTE    PATIENT SEEN FOR: MICU follow up.     SOURCE: [] Patient  [x] Current Medical Record  [x] RN  [] Family/support person at bedside  [x] Patient unavailable/inappropriate  [] Other:     CHART REVIEWED/EVENTS NOTED.  [] No changes to nutrition care plan to note  [x] Nutrition Status:  - Extubated   - Not sedated  - Seen by speech pathologist  for FEES, however Pt unable to tolerate scope  - Pulled out NGT , pt is willing to have NGT tube replaced "later" today   - Active c. diff infection   - Metastatic neuroendocrine pancreatic cancer     DIET ORDER:   Diet, NPO:   Except Medications (- @ 16:57) [Active]      CURRENT DIET ORDER IS:  [] Appropriate:  [] Inadequate:  [x] Other: See recommendations below     NUTRITION INTAKE/PROVISION:  [] PO:   [x] Enteral Nutrition:  Vital 1.5 @ 65ml/hr X 18 hours (-)  Trickle feeds (-)  NPO (->)  [] Parenteral Nutrition:    ANTHROPOMETRICS:  Drug Dosing Weight  Height (cm): 188 (2024 13:27)  Weight (kg): 125.6 (2024 00:00); previously 98.7 kg ()  BMI (kg/m2): 35.5 (2024 00:00)    Weights:   Daily Weight in k.6 ( -) Weight in k.6 ( -), Weight in k.4 (-), 140.3 (-), 125.6 (-), 120.1 ()   Increase in wt likely fluid retention (edema and hx of being diuresis during this admission). RD will continue to trend as new wts available/able.     NUTRITIONALLY PERTINENT MEDICATIONS (STANDING):  atorvastatin 80 milliGRAM(s) Oral at bedtime  chlorhexidine 2% Cloths 1 Application(s) Topical <User Schedule>  chlorhexidine 4% Liquid 1 Application(s) Topical <User Schedule>  digoxin     Tablet 125 MICROGram(s) Oral daily  doxazosin 2 milliGRAM(s) Oral at bedtime  folic acid Injectable 1 milliGRAM(s) IV Push daily  insulin lispro (ADMELOG) corrective regimen sliding scale   SubCutaneous every 6 hours  midodrine 10 milliGRAM(s) Oral every 8 hours  multivitamin 1 Tablet(s) Oral daily  octreotide  Injectable 250 MICROGram(s) IV Push three times a day  pantoprazole  Injectable 40 milliGRAM(s) IV Push every 12 hours  potassium chloride  20 mEq/100 mL IVPB 20 milliEquivalent(s) IV Intermittent every 4 hours  thiamine Injectable 100 milliGRAM(s) IV Push daily    MEDICATIONS  (PRN):  sodium chloride 0.9% lock flush 10 milliLiter(s) IV Push every 1 hour PRN Pre/post blood products, medications, blood draw, and to maintain line patency      NUTRITIONALLY PERTINENT LABS:   @ 00:25: Na 145, BUN 28<H>, Cr 0.85, <H>, K+ 3.5, Phos 2.5, Mg 1.8, Alk Phos 179<H>, ALT/SGPT 26, AST/SGOT 39, HbA1c --   @ 11:45: Na 144, BUN 29<H>, Cr 0.84, <H>, K+ 2.8<LL>, Phos 3.0, Mg 1.9, Alk Phos --, ALT/SGPT --, AST/SGOT --, HbA1c --    A1C with Estimated Average Glucose Result: 6.4 % (07-15-24 @ 09:38)    Finger Sticks:  POCT Blood Glucose.: 124 mg/dL (24 @ 05:26)  POCT Blood Glucose.: 125 mg/dL (24 @ 18:31)  POCT Blood Glucose.: 126 mg/dL (24 @ 11:22)    NUTRITIONALLY PERTINENT MEDICATIONS/LABS:  [x] Reviewed  [x] Relevant notes on medications/labs:  - Hypernatremia (resolved)  - Low K+ - repleted (ordered for potassium chloride)  - Ordered for Multivitamin, thiamine, folic acid  - sliding scale of insulin; on steroid  during this admission which can elevate BG    EDEMA:  [x] Reviewed: +4 generalized;+4 Dependent   [] Relevant notes:    GI/ I&O:  [x] Reviewed  [x] Relevant notes: Last BM 24  [] Other:     SKIN:   [] No pressure injuries documented, per nursing flowsheet  [x] Pressure injury previously noted: Suspected deep tissue injury Right medial back  [] Change in pressure injury documentation:   [] Other:    ESTIMATED NEEDS:  Based on previous dosing wt 98.7 kg (-)  Energy: (23-28 kcal/kg) 2270 - 2764 kcal/day  Protein: (1.2-1.4 g/kg) 118 - 138 g/day   Fluid needs deferred to provider.     NUTRITION DIAGNOSIS:  [x] Prior Dx: 1) Severe chronic malnutrition 2) Increased Nutrient Needs  [] New Dx:    EDUCATION:  [] Yes:  [x] Not appropriate/warranted    NUTRITION CARE PLAN:  1. Diet: When Medical Feasible as tolerated Vital 1.5 at 10 mL/hr increasing only as tolerated and electrolytes WNL to goal rate 65 mL/hr x24 hours with ProSource TF Free x1 daily to provide total volume 1560 mL, 2430 kcals, 120 gm protein, and 1192 mL free water. Meets 25 kcals/kG and 1.2 gm protein/kG based on previous dosing wt 98.7 kG.   -> Refeed risk given prolonged inadequate energy intake; 24 hour feeds for refeeding risk  2. Multivitamin/mineral supplementation: As medically feasible, continue to provide thiamine, folic acid and add multivitamin for refeeding risk.   3. RD remains available to adjust EN formulary, volume/rate    [] Achieved - Continue current nutrition intervention(s)  [] Current medical condition precludes nutrition intervention at this time.    MONITORING AND EVALUATION:   RD remains available upon request and will follow up per protocol.    Corina Alcocer, MS,RDN,CDN AVAILABLE ON MS TEAMS

## 2024-08-01 NOTE — PROGRESS NOTE ADULT - SUBJECTIVE AND OBJECTIVE BOX
Subjective: Patient NANDO HERNANDEZ seen and examined at bedside. No overnight events. Patient refused exam this morning.      Objective:    ICU Vital Signs Last 24 Hrs  T(F): 98.8 (08-01-24 @ 08:00), Max: 98.8 (08-01-24 @ 08:00)  HR: 94 (08-01-24 @ 11:00) (86 - 121)  BP: 105/57 (08-01-24 @ 11:00) (95/51 - 112/58)  BP(mean): 77 (08-01-24 @ 11:00) (69 - 82)  ABP: --  RR: 21 (08-01-24 @ 11:00) (14 - 28)  SpO2: 100% (08-01-24 @ 11:00) (96% - 100%)      General: Awake and alert, nad, lying in bed      07-31-24 @ 07:01  -  08-01-24 @ 07:00  --------------------------------------------------------  IN:    IV PiggyBack: 266.6 mL    IV PiggyBack: 300 mL    Lactated Ringers: 500 mL  Total IN: 1066.6 mL    OUT:    Voided (mL): 600 mL  Total OUT: 600 mL    Total NET: 466.6 mL      08-01-24 @ 07:01  -  08-01-24 @ 12:02  --------------------------------------------------------  IN:    IV PiggyBack: 333.2 mL    IV PiggyBack: 50 mL  Total IN: 383.2 mL    OUT:  Total OUT: 0 mL    Total NET: 383.2 mL        LABS:    08-01    147<H>  |  110<H>  |  28<H>  ----------------------------<  121<H>  3.7   |  24  |  0.79    Ca    7.5<L>      01 Aug 2024 00:47  Phos  2.3     08-01  Mg     1.6     08-01    TPro  4.2<L>  /  Alb  2.4<L>  /  TBili  0.7  /  DBili  x   /  AST  35  /  ALT  20  /  AlkPhos  144<H>  08-01  LIVER FUNCTIONS - ( 01 Aug 2024 00:47 )  Alb: 2.4 g/dL / Pro: 4.2 g/dL / ALK PHOS: 144 U/L / ALT: 20 U/L / AST: 35 U/L / GGT: x                               7.9    8.86  )-----------( 124      ( 01 Aug 2024 00:47 )             25.2   PT/INR - ( 01 Aug 2024 00:47 )   PT: 12.2 sec;   INR: 1.11 ratio         PTT - ( 01 Aug 2024 00:47 )  PTT:25.1 sec  ABG - ( 31 Jul 2024 15:55 )  pH, Arterial: 7.46  pH, Blood: x     /  pCO2: 34    /  pO2: 83    / HCO3: 24    / Base Excess: 0.5   /  SaO2: 99.2            Urinalysis Basic - ( 01 Aug 2024 00:47 )    Color: x / Appearance: x / SG: x / pH: x  Gluc: 121 mg/dL / Ketone: x  / Bili: x / Urobili: x   Blood: x / Protein: x / Nitrite: x   Leuk Esterase: x / RBC: x / WBC x   Sq Epi: x / Non Sq Epi: x / Bacteria: x    CAPILLARY BLOOD GLUCOSE      POCT Blood Glucose.: 124 mg/dL (01 Aug 2024 05:49)  POCT Blood Glucose.: 113 mg/dL (01 Aug 2024 00:18)  POCT Blood Glucose.: 140 mg/dL (31 Jul 2024 18:24)  POCT Blood Glucose.: 131 mg/dL (31 Jul 2024 12:15)    MEDICATIONS  (STANDING):  dextrose 50% Injectable 25 Gram(s) IV Push once  digoxin  Injectable 62.5 MICROGram(s) IV Push daily  folic acid Injectable 1 milliGRAM(s) IV Push daily  hydrocortisone sodium succinate Injectable 25 milliGRAM(s) IV Push daily  insulin lispro (ADMELOG) corrective regimen sliding scale   SubCutaneous every 6 hours  midodrine 10 milliGRAM(s) Oral every 8 hours  octreotide  Injectable 250 MICROGram(s) IV Push three times a day  pantoprazole  Injectable 40 milliGRAM(s) IV Push every 12 hours  thiamine Injectable 100 milliGRAM(s) IV Push daily    MEDICATIONS  (PRN):

## 2024-08-01 NOTE — PROGRESS NOTE ADULT - PROBLEM SELECTOR PLAN 9
DVT: unable to be on anything but SCDs  Diet: Unable to tolerate diet and repeatedly removing NGT but does seem to intermittently have capacity so not currently restraining patient either  GOC: Full code Patient with C Diff diarrhea and required vanc.  - Currently resolved

## 2024-08-01 NOTE — PROGRESS NOTE ADULT - PROBLEM SELECTOR PLAN 4
Patient with CAD with multiple stents done as recently as 6/2024 but unable to be on DAPT due to persistent GIB.  - At risk for stent closure, but difficult to manage due to persistent GIB and recent hemorrhagic shock with multiple events requiring frequent transfusions in just the past several weeks. Patient initially with RZAIA in the setting of hypotension possibly related to the hemorrhagic shock but now recovering.

## 2024-08-01 NOTE — PROGRESS NOTE ADULT - PROBLEM SELECTOR PLAN 6
Patient with failure to thrive iso metastatic disease as well as repeated intubation and difficulty swallowing so pending barium swallow  - Barium swallow f/u Patient with known pancreatic neuroendocrine tumor and was on lanreotide. Was later on ratiotherapy but due to multiple hospitalizations, was unable to receive follow-up doses. Patient did have PET done in May that showed lesions suspicious for ets. CT also in May showed increased hepatic metastases.   - Continue octreotide 250mcg TID

## 2024-08-01 NOTE — PROGRESS NOTE ADULT - PROBLEM SELECTOR PLAN 8
Patient with C Diff diarrhea and required vanc.  - Currently resolved Patient with T2DM although also with adrenal insufficiency on hydrocortisone taper.  - Continue to taper hydrocortisone, on LDSSI given currently NPO

## 2024-08-01 NOTE — PROGRESS NOTE ADULT - PROBLEM SELECTOR PLAN 2
Patient with chronic afib unable to be on AC for this and seemingly minimally responsive to amiodarone. Cardiology following, no obvious underlying cause aside from hemorrhage, which is difficult to control.  - Continue digoxin per cardiology  - F/u repeat level on 8/3  - F/u cardiology recs Patient with persistent UGIB with coffee ground emesis and melanotic stools multiple times requiring IR embolization initially and then EGD with clipping for duodenal ulcer. Patient has been off of his aspirin and plavix for his very recent stent and Eliquis for his chronic afib since he has required so many repeat transfusions due to this active blood loss. The hemorrhagic shock seems to have temporarily resolved. No transfusions since 7/28. Hgb seems to be stable as well.  - Hold all AC and anti-platelet agents  - Protonix BID per GI  - Maintain T&S, CBCs

## 2024-08-01 NOTE — CHART NOTE - NSCHARTNOTEFT_GEN_A_CORE
MAR Reject Note     Transfer from: (  ) Medicine    ( x ) Telemetry     (   ) RCU        (    ) Palliative         (   ) Stroke Unit          (   ) __________________    HPI: 78 yo male with metastatic neuroendocrine pancreatic cancer (tx on hold) and PMH of CAD s/p PCI x3 with most recent stent 6/12/24 on Plavix and Eliquis, chronic Afib on Eliquis, orthostatic hypotension on midodrine, PAD, recent admission for dx cath on 6/24/24 presented from PCP's office for hypotension despite taking AM midodrine dose on 7/2, admitted to medicine for symptomatic hypotension and RAZIA. Per chart, on 7/8 PM, pt was noted to have acute onset of melena x5 and coffee ground emesis x2-3. RRT was called on 7/9 AM for hypotension, hgb dropped from 9.2 to 7.4 (admission baseline 10-11), FOBT +, pt was started on PPI IV and transfused 1 unit of pRBC. GI was consulted and underwent EGD on 7/9 afternoon. MICU was consulted for uncontrolled bleeding during EGD. During EGD, pt became hypotensive and was given phenylephrine, had A-fib with RVR s/p amiodarone. Now s/p IR GDA embolization, admitted to MICU for further monitoring i.s.o hemorrhagic shock 2/2 GI bleed. On repeat EGD, oozing but no active bleeding was noted. Hospital course c/b c.diff, and patient was started on vancomycin from 7/14-7/23; vancomycin was given prophylactically from 7/24-7/25. Patient had melanotic stool 7/18 with Hgb drop 10.1 to 7.1 and received 2 units pRBC and 1 unit platelets. Patient was reintubated and underwent EGD 7/18 afternoon. GI found duodenal bleed. Patient now s/p 3x clips and epi. Since 7/18 patient has continued to have melanotic stools with hemoglobin drops concerning for slow rebleeding. Currently, discussion revolves around the risks and benefits of antiplatelet therapy for recent stent vs risk of GI rebleed, and potential to undergo surgery. Last blood transfusion was given on 7/28; pt's Hgb has been stable since then. On 7/11, pt began developing leukocytosis - was started on Zosyn. Sputum culture tested positive for CRE Klebsiella (7/13). Zosyn was then discontinued d/t resistance (7/18), and pt was started on meropenem from 7/18 to 7/25. On 7/28, pt self-removed NG tube. Pt was evaluated by speech and swallow team; however, failed evaluation. On 7/30, NGT was replaced, which pt had self-removed later on that same day. No plan to replace NGT for now. Pt was reevaluated by speech and swallow team on 8/1, who recommended barium swallow study.    - Total transfusions: 17 pRBC, 4 platelet, 5 FFP (as of 7/28)    OF NOTE: Patient was downgraded out of ICU to the floors with apparently being afib RVR with rates in the 130s sustaining and hypotension to the 90s systolic. Cardiology NP told the family (wife at bedside) that they didn't feel this patient would be appropriate to be downgraded out of the ICU. Of note, he also is supposed to be getting midodrine through an NGT which was not placed prior to ICU downgrade (he apparently had an NGT but pulled it out and it was not replaced prior to ICU downgrade). They also were sent to a Telemetry floor but a Tele order was not in place.     Given current hemodynamic instability which should've been addressed in the MICU prior to downgrade, we cannot accept this patient to the floors at this time, and believe that MICU along with cardiology should've stabilized this patient prior to downgrade.     Case discussed extensively with [facility] Dr. Hoang who feels this patient is more ICU appropriate at this time.     Goals of care was discussed with wife at bedside who report he is full code.     MICU asked to reevaluate the patient as a formal consult.     Follow Up Items:   - MICU consult was called for the above, we believe he is not floors appropriate at this time  - NGT for midodrine  - stat cardiology consult for rate/rhythm control   - cards recs for ac, currently on hold given severe GIB during admission   - should hold off on tube feeds for now, suctioning and chest PT recommended given pt seems to have upper airway secretions and signs of aspiration  [ ] f/u barium swallow study - speech and swallow recs  [ ] monitor Hgb - transfuse as needed  [ ] f/u GI recs regarding A/C recs  [ ] f/u cardio recs regarding Afib and post-stent management   [ ] f/u heme/onc recs regarding metastatic neuroendocrine pancreatic cancer    Case discussed with Dr. Hoang     SUBJECTIVE DATA:    OBJECTIVE DATA:    Vital Signs Last 24 Hrs  T(C): 37.1 (01 Aug 2024 16:00), Max: 37.1 (01 Aug 2024 08:00)  T(F): 98.8 (01 Aug 2024 16:00), Max: 98.8 (01 Aug 2024 08:00)  HR: 122 (01 Aug 2024 18:00) (86 - 131)  BP: 97/53 (01 Aug 2024 18:00) (95/51 - 112/58)  BP(mean): 72 (01 Aug 2024 18:00) (69 - 82)  RR: 18 (01 Aug 2024 18:00) (14 - 28)  SpO2: 98% (01 Aug 2024 18:00) (95% - 100%)    Parameters below as of 01 Aug 2024 15:21  Patient On (Oxygen Delivery Method): room air      I&O's Summary    31 Jul 2024 07:01  -  01 Aug 2024 07:00  --------------------------------------------------------  IN: 1066.6 mL / OUT: 600 mL / NET: 466.6 mL    01 Aug 2024 07:01  -  01 Aug 2024 19:37  --------------------------------------------------------  IN: 383.2 mL / OUT: 0 mL / NET: 383.2 mL        Allergies:      MEDICATIONS  (STANDING):  dextrose 50% Injectable 25 Gram(s) IV Push once  digoxin  Injectable 62.5 MICROGram(s) IV Push daily  folic acid Injectable 1 milliGRAM(s) IV Push daily  hydrocortisone sodium succinate Injectable 25 milliGRAM(s) IV Push daily  insulin lispro (ADMELOG) corrective regimen sliding scale   SubCutaneous every 6 hours  midodrine 10 milliGRAM(s) Oral every 8 hours  octreotide  Injectable 250 MICROGram(s) IV Push three times a day  pantoprazole  Injectable 40 milliGRAM(s) IV Push every 12 hours  thiamine Injectable 100 milliGRAM(s) IV Push daily    MEDICATIONS  (PRN):      LABS                                            7.9                   Neurophils% (auto):   86.5   (08-01 @ 00:47):    8.86 )-----------(124          Lymphocytes% (auto):  8.2                                           25.2                   Eosinphils% (auto):   0.7      Manual%: Neutrophils x    ; Lymphocytes x    ; Eosinophils x    ; Bands%: x    ; Blasts x                                    147    |  110    |  28                  Calcium: 7.5   / iCa: x      (08-01 @ 00:47)    ----------------------------<  121       Magnesium: 1.6                              3.7     |  24     |  0.79             Phosphorous: 2.3      TPro  4.2    /  Alb  2.4    /  TBili  0.7    /  DBili  x      /  AST  35     /  ALT  20     /  AlkPhos  144    01 Aug 2024 00:47    ( 08-01 @ 00:47 )   PT: 12.2 sec;   INR: 1.11 ratio  aPTT: 25.1 sec MAR Reject Note     Transfer from: (  ) Medicine    ( x ) Telemetry     (   ) RCU        (    ) Palliative         (   ) Stroke Unit          (   ) __________________    HPI: 80 yo male with metastatic neuroendocrine pancreatic cancer (tx on hold) and PMH of CAD s/p PCI x3 with most recent stent 6/12/24 on Plavix and Eliquis, chronic Afib on Eliquis, orthostatic hypotension on midodrine, PAD, recent admission for dx cath on 6/24/24 presented from PCP's office for hypotension despite taking AM midodrine dose on 7/2, admitted to medicine for symptomatic hypotension and RAZIA. Per chart, on 7/8 PM, pt was noted to have acute onset of melena x5 and coffee ground emesis x2-3. RRT was called on 7/9 AM for hypotension, hgb dropped from 9.2 to 7.4 (admission baseline 10-11), FOBT +, pt was started on PPI IV and transfused 1 unit of pRBC. GI was consulted and underwent EGD on 7/9 afternoon. MICU was consulted for uncontrolled bleeding during EGD. During EGD, pt became hypotensive and was given phenylephrine, had A-fib with RVR s/p amiodarone. Now s/p IR GDA embolization, admitted to MICU for further monitoring i.s.o hemorrhagic shock 2/2 GI bleed. On repeat EGD, oozing but no active bleeding was noted. Hospital course c/b c.diff, and patient was started on vancomycin from 7/14-7/23; vancomycin was given prophylactically from 7/24-7/25. Patient had melanotic stool 7/18 with Hgb drop 10.1 to 7.1 and received 2 units pRBC and 1 unit platelets. Patient was reintubated and underwent EGD 7/18 afternoon. GI found duodenal bleed. Patient now s/p 3x clips and epi. Since 7/18 patient has continued to have melanotic stools with hemoglobin drops concerning for slow rebleeding. Currently, discussion revolves around the risks and benefits of antiplatelet therapy for recent stent vs risk of GI rebleed, and potential to undergo surgery. Last blood transfusion was given on 7/28; pt's Hgb has been stable since then. On 7/11, pt began developing leukocytosis - was started on Zosyn. Sputum culture tested positive for CRE Klebsiella (7/13). Zosyn was then discontinued d/t resistance (7/18), and pt was started on meropenem from 7/18 to 7/25. On 7/28, pt self-removed NG tube. Pt was evaluated by speech and swallow team; however, failed evaluation. On 7/30, NGT was replaced, which pt had self-removed later on that same day. No plan to replace NGT for now. Pt was reevaluated by speech and swallow team on 8/1, who recommended barium swallow study.    - Total transfusions: 17 pRBC, 4 platelet, 5 FFP (as of 7/28)    OF NOTE: Patient was downgraded out of ICU to the floors with apparently being afib RVR with rates in the 130s sustaining and hypotension to the 90s systolic. Cardiology NP told the family (wife at bedside) that they didn't feel this patient would be appropriate to be downgraded out of the ICU. Of note, he also is supposed to be getting midodrine through an NGT which was not placed prior to ICU downgrade (he apparently had an NGT but pulled it out and it was not replaced prior to ICU downgrade). They also were sent to a Telemetry floor but a Tele order was not in place. He is also AO 1-2 and lethargic.     Given current hemodynamic instability which should've been addressed in the MICU prior to downgrade, we cannot accept this patient to the floors at this time, and believe that MICU along with cardiology should've stabilized this patient prior to downgrade.     Case discussed extensively with [facility] Dr. Hoang who feels this patient is more ICU appropriate at this time.     Goals of care was discussed with wife at bedside who report he is full code.     MICU asked to reevaluate the patient as a formal consult.     Follow Up Items:   - MICU consult was called for the above, we believe he is not floors appropriate at this time  - NGT for midodrine  - stat cardiology consult for rate/rhythm control   - cards recs for ac, currently on hold given severe GIB during admission   - should hold off on tube feeds for now, suctioning and chest PT recommended given pt seems to have upper airway secretions and signs of aspiration  [ ] f/u barium swallow study - speech and swallow recs  [ ] monitor Hgb - transfuse as needed  [ ] f/u GI recs regarding A/C recs  [ ] f/u cardio recs regarding Afib and post-stent management   [ ] f/u heme/onc recs regarding metastatic neuroendocrine pancreatic cancer    Case discussed with Dr. Hoang     SUBJECTIVE DATA:    OBJECTIVE DATA:    Vital Signs Last 24 Hrs  T(C): 37.1 (01 Aug 2024 16:00), Max: 37.1 (01 Aug 2024 08:00)  T(F): 98.8 (01 Aug 2024 16:00), Max: 98.8 (01 Aug 2024 08:00)  HR: 122 (01 Aug 2024 18:00) (86 - 131)  BP: 97/53 (01 Aug 2024 18:00) (95/51 - 112/58)  BP(mean): 72 (01 Aug 2024 18:00) (69 - 82)  RR: 18 (01 Aug 2024 18:00) (14 - 28)  SpO2: 98% (01 Aug 2024 18:00) (95% - 100%)    Parameters below as of 01 Aug 2024 15:21  Patient On (Oxygen Delivery Method): room air      I&O's Summary    31 Jul 2024 07:01  -  01 Aug 2024 07:00  --------------------------------------------------------  IN: 1066.6 mL / OUT: 600 mL / NET: 466.6 mL    01 Aug 2024 07:01  -  01 Aug 2024 19:37  --------------------------------------------------------  IN: 383.2 mL / OUT: 0 mL / NET: 383.2 mL        Allergies:      MEDICATIONS  (STANDING):  dextrose 50% Injectable 25 Gram(s) IV Push once  digoxin  Injectable 62.5 MICROGram(s) IV Push daily  folic acid Injectable 1 milliGRAM(s) IV Push daily  hydrocortisone sodium succinate Injectable 25 milliGRAM(s) IV Push daily  insulin lispro (ADMELOG) corrective regimen sliding scale   SubCutaneous every 6 hours  midodrine 10 milliGRAM(s) Oral every 8 hours  octreotide  Injectable 250 MICROGram(s) IV Push three times a day  pantoprazole  Injectable 40 milliGRAM(s) IV Push every 12 hours  thiamine Injectable 100 milliGRAM(s) IV Push daily    MEDICATIONS  (PRN):      LABS                                            7.9                   Neurophils% (auto):   86.5   (08-01 @ 00:47):    8.86 )-----------(124          Lymphocytes% (auto):  8.2                                           25.2                   Eosinphils% (auto):   0.7      Manual%: Neutrophils x    ; Lymphocytes x    ; Eosinophils x    ; Bands%: x    ; Blasts x                                    147    |  110    |  28                  Calcium: 7.5   / iCa: x      (08-01 @ 00:47)    ----------------------------<  121       Magnesium: 1.6                              3.7     |  24     |  0.79             Phosphorous: 2.3      TPro  4.2    /  Alb  2.4    /  TBili  0.7    /  DBili  x      /  AST  35     /  ALT  20     /  AlkPhos  144    01 Aug 2024 00:47    ( 08-01 @ 00:47 )   PT: 12.2 sec;   INR: 1.11 ratio  aPTT: 25.1 sec MAR Reject Note     Transfer from: (  ) Medicine    ( x ) Telemetry     (   ) RCU        (    ) Palliative         (   ) Stroke Unit          (   ) __________________    HPI: 80 yo male with metastatic neuroendocrine pancreatic cancer (tx on hold) and PMH of CAD s/p PCI x3 with most recent stent 6/12/24 on Plavix and Eliquis, chronic Afib on Eliquis, orthostatic hypotension on midodrine, PAD, recent admission for dx cath on 6/24/24 presented from PCP's office for hypotension despite taking AM midodrine dose on 7/2, admitted to medicine for symptomatic hypotension and RAZIA. Per chart, on 7/8 PM, pt was noted to have acute onset of melena x5 and coffee ground emesis x2-3. RRT was called on 7/9 AM for hypotension, hgb dropped from 9.2 to 7.4 (admission baseline 10-11), FOBT +, pt was started on PPI IV and transfused 1 unit of pRBC. GI was consulted and underwent EGD on 7/9 afternoon. MICU was consulted for uncontrolled bleeding during EGD. During EGD, pt became hypotensive and was given phenylephrine, had A-fib with RVR s/p amiodarone. Now s/p IR GDA embolization, admitted to MICU for further monitoring i.s.o hemorrhagic shock 2/2 GI bleed. On repeat EGD, oozing but no active bleeding was noted. Hospital course c/b c.diff, and patient was started on vancomycin from 7/14-7/23; vancomycin was given prophylactically from 7/24-7/25. Patient had melanotic stool 7/18 with Hgb drop 10.1 to 7.1 and received 2 units pRBC and 1 unit platelets. Patient was reintubated and underwent EGD 7/18 afternoon. GI found duodenal bleed. Patient now s/p 3x clips and epi. Since 7/18 patient has continued to have melanotic stools with hemoglobin drops concerning for slow rebleeding. Currently, discussion revolves around the risks and benefits of antiplatelet therapy for recent stent vs risk of GI rebleed, and potential to undergo surgery. Last blood transfusion was given on 7/28; pt's Hgb has been stable since then. On 7/11, pt began developing leukocytosis - was started on Zosyn. Sputum culture tested positive for CRE Klebsiella (7/13). Zosyn was then discontinued d/t resistance (7/18), and pt was started on meropenem from 7/18 to 7/25. On 7/28, pt self-removed NG tube. Pt was evaluated by speech and swallow team; however, failed evaluation. On 7/30, NGT was replaced, which pt had self-removed later on that same day. No plan to replace NGT for now. Pt was reevaluated by speech and swallow team on 8/1, who recommended barium swallow study.    - Total transfusions: 17 pRBC, 4 platelet, 5 FFP (as of 7/28)    OF NOTE: Patient was downgraded out of ICU to the floors with apparently being afib RVR with rates in the 130s sustaining and hypotension to the 90s systolic. Cardiology NP told the family (wife at bedside) that they didn't feel this patient would be appropriate to be downgraded out of the ICU. Of note, he also is supposed to be getting midodrine through an NGT which was not placed prior to ICU downgrade (he apparently had an NGT but pulled it out and it was not replaced prior to ICU downgrade). They also were sent to a Telemetry floor but a Tele order was not in place. He is also AO 1-2 and lethargic.     Given current hemodynamic instability which should've been addressed in the MICU prior to downgrade, we cannot accept this patient to the floors at this time, and believe that MICU should've stabilized this patient prior to downgrade.     Case discussed extensively with [facility] Dr. Hoang who feels this patient is more ICU appropriate at this time.     Goals of care was discussed with wife at bedside who report he is full code.     MICU asked to reevaluate the patient as a formal consult.     Follow Up Items:   - MICU consult was called for the above, we believe he is not floors appropriate at this time  - NGT for midodrine  - stat cardiology consult for rate/rhythm control   - cards recs for ac, currently on hold given severe GIB during admission   - should hold off on tube feeds for now, suctioning and chest PT recommended given pt seems to have upper airway secretions and signs of aspiration  [ ] f/u barium swallow study - speech and swallow recs  [ ] monitor Hgb - transfuse as needed  [ ] f/u GI recs regarding A/C recs  [ ] f/u cardio recs regarding Afib and post-stent management   [ ] f/u heme/onc recs regarding metastatic neuroendocrine pancreatic cancer    Case discussed with Dr. Hoang     SUBJECTIVE DATA:    OBJECTIVE DATA:    Vital Signs Last 24 Hrs  T(C): 37.1 (01 Aug 2024 16:00), Max: 37.1 (01 Aug 2024 08:00)  T(F): 98.8 (01 Aug 2024 16:00), Max: 98.8 (01 Aug 2024 08:00)  HR: 122 (01 Aug 2024 18:00) (86 - 131)  BP: 97/53 (01 Aug 2024 18:00) (95/51 - 112/58)  BP(mean): 72 (01 Aug 2024 18:00) (69 - 82)  RR: 18 (01 Aug 2024 18:00) (14 - 28)  SpO2: 98% (01 Aug 2024 18:00) (95% - 100%)    Parameters below as of 01 Aug 2024 15:21  Patient On (Oxygen Delivery Method): room air      I&O's Summary    31 Jul 2024 07:01  -  01 Aug 2024 07:00  --------------------------------------------------------  IN: 1066.6 mL / OUT: 600 mL / NET: 466.6 mL    01 Aug 2024 07:01  -  01 Aug 2024 19:37  --------------------------------------------------------  IN: 383.2 mL / OUT: 0 mL / NET: 383.2 mL        Allergies:      MEDICATIONS  (STANDING):  dextrose 50% Injectable 25 Gram(s) IV Push once  digoxin  Injectable 62.5 MICROGram(s) IV Push daily  folic acid Injectable 1 milliGRAM(s) IV Push daily  hydrocortisone sodium succinate Injectable 25 milliGRAM(s) IV Push daily  insulin lispro (ADMELOG) corrective regimen sliding scale   SubCutaneous every 6 hours  midodrine 10 milliGRAM(s) Oral every 8 hours  octreotide  Injectable 250 MICROGram(s) IV Push three times a day  pantoprazole  Injectable 40 milliGRAM(s) IV Push every 12 hours  thiamine Injectable 100 milliGRAM(s) IV Push daily    MEDICATIONS  (PRN):      LABS                                            7.9                   Neurophils% (auto):   86.5   (08-01 @ 00:47):    8.86 )-----------(124          Lymphocytes% (auto):  8.2                                           25.2                   Eosinphils% (auto):   0.7      Manual%: Neutrophils x    ; Lymphocytes x    ; Eosinophils x    ; Bands%: x    ; Blasts x                                    147    |  110    |  28                  Calcium: 7.5   / iCa: x      (08-01 @ 00:47)    ----------------------------<  121       Magnesium: 1.6                              3.7     |  24     |  0.79             Phosphorous: 2.3      TPro  4.2    /  Alb  2.4    /  TBili  0.7    /  DBili  x      /  AST  35     /  ALT  20     /  AlkPhos  144    01 Aug 2024 00:47    ( 08-01 @ 00:47 )   PT: 12.2 sec;   INR: 1.11 ratio  aPTT: 25.1 sec MAR Note     Transfer from: (  ) Medicine    ( x ) Telemetry     (   ) RCU        (    ) Palliative         (   ) Stroke Unit          (   ) __________________    HPI: 78 yo male with metastatic neuroendocrine pancreatic cancer (tx on hold) and PMH of CAD s/p PCI x3 with most recent stent 6/12/24 on Plavix and Eliquis, chronic Afib on Eliquis, orthostatic hypotension on midodrine, PAD, recent admission for dx cath on 6/24/24 presented from PCP's office for hypotension despite taking AM midodrine dose on 7/2, admitted to medicine for symptomatic hypotension and RAZIA. Per chart, on 7/8 PM, pt was noted to have acute onset of melena x5 and coffee ground emesis x2-3. RRT was called on 7/9 AM for hypotension, hgb dropped from 9.2 to 7.4 (admission baseline 10-11), FOBT +, pt was started on PPI IV and transfused 1 unit of pRBC. GI was consulted and underwent EGD on 7/9 afternoon. MICU was consulted for uncontrolled bleeding during EGD. During EGD, pt became hypotensive and was given phenylephrine, had A-fib with RVR s/p amiodarone. Now s/p IR GDA embolization, admitted to MICU for further monitoring i.s.o hemorrhagic shock 2/2 GI bleed. On repeat EGD, oozing but no active bleeding was noted. Hospital course c/b c.diff, and patient was started on vancomycin from 7/14-7/23; vancomycin was given prophylactically from 7/24-7/25. Patient had melanotic stool 7/18 with Hgb drop 10.1 to 7.1 and received 2 units pRBC and 1 unit platelets. Patient was reintubated and underwent EGD 7/18 afternoon. GI found duodenal bleed. Patient now s/p 3x clips and epi. Since 7/18 patient has continued to have melanotic stools with hemoglobin drops concerning for slow rebleeding. Currently, discussion revolves around the risks and benefits of antiplatelet therapy for recent stent vs risk of GI rebleed, and potential to undergo surgery. Last blood transfusion was given on 7/28; pt's Hgb has been stable since then. On 7/11, pt began developing leukocytosis - was started on Zosyn. Sputum culture tested positive for CRE Klebsiella (7/13). Zosyn was then discontinued d/t resistance (7/18), and pt was started on meropenem from 7/18 to 7/25. On 7/28, pt self-removed NG tube. Pt was evaluated by speech and swallow team; however, failed evaluation. On 7/30, NGT was replaced, which pt had self-removed later on that same day. No plan to replace NGT for now. Pt was reevaluated by speech and swallow team on 8/1, who recommended barium swallow study.    - Total transfusions: 17 pRBC, 4 platelet, 5 FFP (as of 7/28)    OF NOTE: Patient was downgraded out of ICU to the floors but appeared to be afib RVR with rates in the 130s sustaining with soft BP's in the 90s systolic. Of note, he also is supposed to be getting midodrine through an NGT which he currently needs (he had an NGT but pulled it out but it was not replaced). They also were sent to a Telemetry floor but we placed a Tele order to make sure he is on continuous telemetry. He is AO 2 and lethargic when seen.     Given current hemodynamic instability, we currently believe patient would benefit from ICU level of care at this time and may not be stable currently for the floors. Case discussed with [facility] Dr. Hoang.     Goals of care was discussed with wife at bedside who report he is full code.     We have asked MICU to reevaluate ICU needs.     Follow Up Items:   - MICU consulted, reported they will reevaluate needs  - frequent vital signs (at least Q4H)   - NGT for midodrine  - stat cardiology consult for rate/rhythm control (Dr. Hayes)  - cards recs for ac, currently on hold given severe GIB during admission   - should hold off on tube feeds for now, suctioning and chest PT recommended given pt seems to have upper airway secretions and signs of aspiration  [ ] f/u barium swallow study - speech and swallow recs  [ ] monitor Hgb - transfuse as needed  [ ] f/u GI recs regarding A/C recs  [ ] f/u cardio recs regarding Afib and post-stent management   [ ] f/u heme/onc recs regarding metastatic neuroendocrine pancreatic cancer    Case discussed with Dr. Hoang     SUBJECTIVE DATA:    OBJECTIVE DATA:    Vital Signs Last 24 Hrs  T(C): 37.1 (01 Aug 2024 16:00), Max: 37.1 (01 Aug 2024 08:00)  T(F): 98.8 (01 Aug 2024 16:00), Max: 98.8 (01 Aug 2024 08:00)  HR: 122 (01 Aug 2024 18:00) (86 - 131)  BP: 97/53 (01 Aug 2024 18:00) (95/51 - 112/58)  BP(mean): 72 (01 Aug 2024 18:00) (69 - 82)  RR: 18 (01 Aug 2024 18:00) (14 - 28)  SpO2: 98% (01 Aug 2024 18:00) (95% - 100%)    Parameters below as of 01 Aug 2024 15:21  Patient On (Oxygen Delivery Method): room air      I&O's Summary    31 Jul 2024 07:01  -  01 Aug 2024 07:00  --------------------------------------------------------  IN: 1066.6 mL / OUT: 600 mL / NET: 466.6 mL    01 Aug 2024 07:01  -  01 Aug 2024 19:37  --------------------------------------------------------  IN: 383.2 mL / OUT: 0 mL / NET: 383.2 mL        Allergies:      MEDICATIONS  (STANDING):  dextrose 50% Injectable 25 Gram(s) IV Push once  digoxin  Injectable 62.5 MICROGram(s) IV Push daily  folic acid Injectable 1 milliGRAM(s) IV Push daily  hydrocortisone sodium succinate Injectable 25 milliGRAM(s) IV Push daily  insulin lispro (ADMELOG) corrective regimen sliding scale   SubCutaneous every 6 hours  midodrine 10 milliGRAM(s) Oral every 8 hours  octreotide  Injectable 250 MICROGram(s) IV Push three times a day  pantoprazole  Injectable 40 milliGRAM(s) IV Push every 12 hours  thiamine Injectable 100 milliGRAM(s) IV Push daily    MEDICATIONS  (PRN):      LABS                                            7.9                   Neurophils% (auto):   86.5   (08-01 @ 00:47):    8.86 )-----------(124          Lymphocytes% (auto):  8.2                                           25.2                   Eosinphils% (auto):   0.7      Manual%: Neutrophils x    ; Lymphocytes x    ; Eosinophils x    ; Bands%: x    ; Blasts x                                    147    |  110    |  28                  Calcium: 7.5   / iCa: x      (08-01 @ 00:47)    ----------------------------<  121       Magnesium: 1.6                              3.7     |  24     |  0.79             Phosphorous: 2.3      TPro  4.2    /  Alb  2.4    /  TBili  0.7    /  DBili  x      /  AST  35     /  ALT  20     /  AlkPhos  144    01 Aug 2024 00:47    ( 08-01 @ 00:47 )   PT: 12.2 sec;   INR: 1.11 ratio  aPTT: 25.1 sec MAR Note     Transfer from: (  ) Medicine    ( x ) Telemetry     (   ) RCU        (    ) Palliative         (   ) Stroke Unit          (   ) __________________    HPI: 80 yo male with metastatic neuroendocrine pancreatic cancer (tx on hold) and PMH of CAD s/p PCI x3 with most recent stent 6/12/24 on Plavix and Eliquis, chronic Afib on Eliquis, orthostatic hypotension on midodrine, PAD, recent admission for dx cath on 6/24/24 presented from PCP's office for hypotension despite taking AM midodrine dose on 7/2, admitted to medicine for symptomatic hypotension and RAZIA. Per chart, on 7/8 PM, pt was noted to have acute onset of melena x5 and coffee ground emesis x2-3. RRT was called on 7/9 AM for hypotension, hgb dropped from 9.2 to 7.4 (admission baseline 10-11), FOBT +, pt was started on PPI IV and transfused 1 unit of pRBC. GI was consulted and underwent EGD on 7/9 afternoon. MICU was consulted for uncontrolled bleeding during EGD. During EGD, pt became hypotensive and was given phenylephrine, had A-fib with RVR s/p amiodarone. Now s/p IR GDA embolization, admitted to MICU for further monitoring i.s.o hemorrhagic shock 2/2 GI bleed. On repeat EGD, oozing but no active bleeding was noted. Hospital course c/b c.diff, and patient was started on vancomycin from 7/14-7/23; vancomycin was given prophylactically from 7/24-7/25. Patient had melanotic stool 7/18 with Hgb drop 10.1 to 7.1 and received 2 units pRBC and 1 unit platelets. Patient was reintubated and underwent EGD 7/18 afternoon. GI found duodenal bleed. Patient now s/p 3x clips and epi. Since 7/18 patient has continued to have melanotic stools with hemoglobin drops concerning for slow rebleeding. Currently, discussion revolves around the risks and benefits of antiplatelet therapy for recent stent vs risk of GI rebleed, and potential to undergo surgery. Last blood transfusion was given on 7/28; pt's Hgb has been stable since then. On 7/11, pt began developing leukocytosis - was started on Zosyn. Sputum culture tested positive for CRE Klebsiella (7/13). Zosyn was then discontinued d/t resistance (7/18), and pt was started on meropenem from 7/18 to 7/25. On 7/28, pt self-removed NG tube. Pt was evaluated by speech and swallow team; however, failed evaluation. On 7/30, NGT was replaced, which pt had self-removed later on that same day. No plan to replace NGT for now. Pt was reevaluated by speech and swallow team on 8/1, who recommended barium swallow study.    - Total transfusions: 17 pRBC, 4 platelet, 5 FFP (as of 7/28)    OF NOTE: Patient was downgraded out of ICU to the floors but appeared to be afib RVR with rates in the 120s-130s with soft BP's in the 90s systolic. Of note, he also is supposed to be getting midodrine through an NGT which he currently needs (he had an NGT but pulled it out but it was not replaced). They also were sent to a Telemetry floor, we placed a Tele order to make sure he is on continuous telemetry. He is AO 2 and lethargic when seen.     Given current hemodynamic instability, we believe patient may benefit from ICU reevaluation. Case discussed with [facility] Dr. Hoang.     Goals of care was discussed with wife at bedside who report he is full code.     Follow Up Items:   - MICU consulted  - frequent vital signs (at least Q4H)   - NGT for midodrine  - cardiology consult for rate/rhythm control (Dr. Hayes)  - cards recs for ac, currently on hold given severe GIB during admission   - should hold off on tube feeds for now, suctioning and chest PT recommended given pt seems to have upper airway secretions and signs of aspiration  [ ] f/u barium swallow study - speech and swallow recs  [ ] monitor Hgb - transfuse as needed  [ ] f/u GI recs regarding A/C recs  [ ] f/u cardio recs regarding Afib and post-stent management   [ ] f/u heme/onc recs regarding metastatic neuroendocrine pancreatic cancer    Case discussed with Dr. Hoang     SUBJECTIVE DATA:    OBJECTIVE DATA:    Vital Signs Last 24 Hrs  T(C): 37.1 (01 Aug 2024 16:00), Max: 37.1 (01 Aug 2024 08:00)  T(F): 98.8 (01 Aug 2024 16:00), Max: 98.8 (01 Aug 2024 08:00)  HR: 122 (01 Aug 2024 18:00) (86 - 131)  BP: 97/53 (01 Aug 2024 18:00) (95/51 - 112/58)  BP(mean): 72 (01 Aug 2024 18:00) (69 - 82)  RR: 18 (01 Aug 2024 18:00) (14 - 28)  SpO2: 98% (01 Aug 2024 18:00) (95% - 100%)    Parameters below as of 01 Aug 2024 15:21  Patient On (Oxygen Delivery Method): room air      I&O's Summary    31 Jul 2024 07:01  -  01 Aug 2024 07:00  --------------------------------------------------------  IN: 1066.6 mL / OUT: 600 mL / NET: 466.6 mL    01 Aug 2024 07:01  -  01 Aug 2024 19:37  --------------------------------------------------------  IN: 383.2 mL / OUT: 0 mL / NET: 383.2 mL        Allergies:      MEDICATIONS  (STANDING):  dextrose 50% Injectable 25 Gram(s) IV Push once  digoxin  Injectable 62.5 MICROGram(s) IV Push daily  folic acid Injectable 1 milliGRAM(s) IV Push daily  hydrocortisone sodium succinate Injectable 25 milliGRAM(s) IV Push daily  insulin lispro (ADMELOG) corrective regimen sliding scale   SubCutaneous every 6 hours  midodrine 10 milliGRAM(s) Oral every 8 hours  octreotide  Injectable 250 MICROGram(s) IV Push three times a day  pantoprazole  Injectable 40 milliGRAM(s) IV Push every 12 hours  thiamine Injectable 100 milliGRAM(s) IV Push daily    MEDICATIONS  (PRN):      LABS                                            7.9                   Neurophils% (auto):   86.5   (08-01 @ 00:47):    8.86 )-----------(124          Lymphocytes% (auto):  8.2                                           25.2                   Eosinphils% (auto):   0.7      Manual%: Neutrophils x    ; Lymphocytes x    ; Eosinophils x    ; Bands%: x    ; Blasts x                                    147    |  110    |  28                  Calcium: 7.5   / iCa: x      (08-01 @ 00:47)    ----------------------------<  121       Magnesium: 1.6                              3.7     |  24     |  0.79             Phosphorous: 2.3      TPro  4.2    /  Alb  2.4    /  TBili  0.7    /  DBili  x      /  AST  35     /  ALT  20     /  AlkPhos  144    01 Aug 2024 00:47    ( 08-01 @ 00:47 )   PT: 12.2 sec;   INR: 1.11 ratio  aPTT: 25.1 sec MAR Note     Transfer from: (  ) Medicine    ( x ) Telemetry     (   ) RCU        (    ) Palliative         (   ) Stroke Unit          (   ) __________________    HPI: 78 yo male with metastatic neuroendocrine pancreatic cancer (tx on hold) and PMH of CAD s/p PCI x3 with most recent stent 6/12/24 on Plavix and Eliquis, chronic Afib on Eliquis, orthostatic hypotension on midodrine, PAD, recent admission for dx cath on 6/24/24 presented from PCP's office for hypotension despite taking AM midodrine dose on 7/2, admitted to medicine for symptomatic hypotension and RAZIA. Per chart, on 7/8 PM, pt was noted to have acute onset of melena x5 and coffee ground emesis x2-3. RRT was called on 7/9 AM for hypotension, hgb dropped from 9.2 to 7.4 (admission baseline 10-11), FOBT +, pt was started on PPI IV and transfused 1 unit of pRBC. GI was consulted and underwent EGD on 7/9 afternoon. MICU was consulted for uncontrolled bleeding during EGD. During EGD, pt became hypotensive and was given phenylephrine, had A-fib with RVR s/p amiodarone. Now s/p IR GDA embolization, admitted to MICU for further monitoring i.s.o hemorrhagic shock 2/2 GI bleed. On repeat EGD, oozing but no active bleeding was noted. Hospital course c/b c.diff, and patient was started on vancomycin from 7/14-7/23; vancomycin was given prophylactically from 7/24-7/25. Patient had melanotic stool 7/18 with Hgb drop 10.1 to 7.1 and received 2 units pRBC and 1 unit platelets. Patient was reintubated and underwent EGD 7/18 afternoon. GI found duodenal bleed. Patient now s/p 3x clips and epi. Since 7/18 patient has continued to have melanotic stools with hemoglobin drops concerning for slow rebleeding. Currently, discussion revolves around the risks and benefits of antiplatelet therapy for recent stent vs risk of GI rebleed, and potential to undergo surgery. Last blood transfusion was given on 7/28; pt's Hgb has been stable since then. On 7/11, pt began developing leukocytosis - was started on Zosyn. Sputum culture tested positive for CRE Klebsiella (7/13). Zosyn was then discontinued d/t resistance (7/18), and pt was started on meropenem from 7/18 to 7/25. On 7/28, pt self-removed NG tube. Pt was evaluated by speech and swallow team; however, failed evaluation. On 7/30, NGT was replaced, which pt had self-removed later on that same day. No plan to replace NGT for now. Pt was reevaluated by speech and swallow team on 8/1, who recommended barium swallow study.    - Total transfusions: 17 pRBC, 4 platelet, 5 FFP (as of 7/28)    OF NOTE: Patient was downgraded out of ICU to the floors but appeared to be afib RVR with rates in the 120s-130s with soft BP's in the 90s systolic. Of note, he also is supposed to be getting midodrine through an NGT which he currently needs (he had an NGT but pulled it out but it was not replaced). They also were sent to a Telemetry floor, we placed a Tele order to make sure he is on continuous telemetry. He is AO 2 and lethargic when seen.     Given current hemodynamic instability, we believe patient may benefit from ICU reevaluation. Case discussed with [facility] Dr. Hoang.     Goals of care was discussed with wife at bedside who report he is full code.     Follow Up Items:   - MICU consulted  - frequent vital signs (at least Q4H)   - NGT for midodrine  - cardiology consult for rate/rhythm control (Dr. Hayes)  - should hold off on tube feeds for now, suctioning and chest PT recommended given pt seems to have upper airway secretions and signs of aspiration  [ ] f/u barium swallow study - speech and swallow recs  [ ] monitor Hgb - transfuse as needed  [ ] f/u GI recs regarding A/C recs  [ ] f/u cardio recs regarding Afib and post-stent management   [ ] f/u heme/onc recs regarding metastatic neuroendocrine pancreatic cancer    Case discussed with Dr. Hoang     SUBJECTIVE DATA:    OBJECTIVE DATA:    Vital Signs Last 24 Hrs  T(C): 37.1 (01 Aug 2024 16:00), Max: 37.1 (01 Aug 2024 08:00)  T(F): 98.8 (01 Aug 2024 16:00), Max: 98.8 (01 Aug 2024 08:00)  HR: 122 (01 Aug 2024 18:00) (86 - 131)  BP: 97/53 (01 Aug 2024 18:00) (95/51 - 112/58)  BP(mean): 72 (01 Aug 2024 18:00) (69 - 82)  RR: 18 (01 Aug 2024 18:00) (14 - 28)  SpO2: 98% (01 Aug 2024 18:00) (95% - 100%)    Parameters below as of 01 Aug 2024 15:21  Patient On (Oxygen Delivery Method): room air      I&O's Summary    31 Jul 2024 07:01  -  01 Aug 2024 07:00  --------------------------------------------------------  IN: 1066.6 mL / OUT: 600 mL / NET: 466.6 mL    01 Aug 2024 07:01  -  01 Aug 2024 19:37  --------------------------------------------------------  IN: 383.2 mL / OUT: 0 mL / NET: 383.2 mL        Allergies:      MEDICATIONS  (STANDING):  dextrose 50% Injectable 25 Gram(s) IV Push once  digoxin  Injectable 62.5 MICROGram(s) IV Push daily  folic acid Injectable 1 milliGRAM(s) IV Push daily  hydrocortisone sodium succinate Injectable 25 milliGRAM(s) IV Push daily  insulin lispro (ADMELOG) corrective regimen sliding scale   SubCutaneous every 6 hours  midodrine 10 milliGRAM(s) Oral every 8 hours  octreotide  Injectable 250 MICROGram(s) IV Push three times a day  pantoprazole  Injectable 40 milliGRAM(s) IV Push every 12 hours  thiamine Injectable 100 milliGRAM(s) IV Push daily    MEDICATIONS  (PRN):      LABS                                            7.9                   Neurophils% (auto):   86.5   (08-01 @ 00:47):    8.86 )-----------(124          Lymphocytes% (auto):  8.2                                           25.2                   Eosinphils% (auto):   0.7      Manual%: Neutrophils x    ; Lymphocytes x    ; Eosinophils x    ; Bands%: x    ; Blasts x                                    147    |  110    |  28                  Calcium: 7.5   / iCa: x      (08-01 @ 00:47)    ----------------------------<  121       Magnesium: 1.6                              3.7     |  24     |  0.79             Phosphorous: 2.3      TPro  4.2    /  Alb  2.4    /  TBili  0.7    /  DBili  x      /  AST  35     /  ALT  20     /  AlkPhos  144    01 Aug 2024 00:47    ( 08-01 @ 00:47 )   PT: 12.2 sec;   INR: 1.11 ratio  aPTT: 25.1 sec

## 2024-08-01 NOTE — PROGRESS NOTE ADULT - PROBLEM SELECTOR PLAN 5
Patient with known pancreatic neuroendocrine tumor and was on lanreotide. Was later on ratiotherapy but due to multiple hospitalizations, was unable to receive follow-up doses. Patient did have PET done in May that showed lesions suspicious for ets. CT also in May showed increased hepatic metastases.   - Continue octreotide 250mcg TID Patient with CAD with multiple stents done as recently as 6/2024 but unable to be on DAPT due to persistent GIB.  - At risk for stent closure, but difficult to manage due to persistent GIB and recent hemorrhagic shock with multiple events requiring frequent transfusions in just the past several weeks.

## 2024-08-01 NOTE — PROGRESS NOTE ADULT - ATTENDING COMMENTS
1. Hypotension:  Now off pressor for >24 hours . No  evidence of current infection.  Decrease hydrocortisone to 25mg daily. TEn off No evidence of active bleeding at this time.    2.GI bleed; S/P embolization of GDA. Duodenal ulcer found on EGD.  Still with some melena. H/H stable. Continue PPI    3. Atrial fib . Rate controlled. Start digoxin..0625mg daily Dig level in 3 days.    4. Neuroendocrine tumor.  with / mets to liver.  Continue octreotide.    5. DVT prophylaxis ; SCD    6. GOC: Full code.      7. NGT placed. Restart PO amiodarone and statin. H/O CAD with recent stent. . Hold DAPT due to multiple GI bleeds. Hold Bumex.    8. Neuro. More somnolent today. VBG with PG 7.27. Check ABG

## 2024-08-01 NOTE — PROGRESS NOTE ADULT - ASSESSMENT
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 Transferred to floors.

## 2024-08-01 NOTE — PROGRESS NOTE ADULT - ASSESSMENT
79 yo male with PMH of CAD s/p PCI x3 with most recent stent 6/12/24 on Plavix, chronic Afib on eliquis, orthostatic hypotension on midodrine, PAD, neuroendocrine tumor with RT currently on hold, recent admission for dx cath on 6/24/24 who presented for hypotension, admitted for GIB and hemorrhagic shock. Found to be bleeding from duodenum. Transferred to MICU, on pressors.     1. Pancreatic NET- metastatic   -- was on lanreotide then had POD in liver and started on peptide receptor radiotherapy (PRRT)- typically given every 8 weeks for 4 doses. He received one dose on 10/20/2023 and planned to get his 2nd dose at the end of December however his course was complicated by multiple hospitalizations that were noncancer related (decompensated heart failure).   -- 5/28/24 PET Dotatate (compared to 9/2023): several new somatostatin avid osseous lesions, some faintly sclerotic, suspicious for mets. Increased upper RP nodes, probably metastatic. Decreased intensely tracer avid right supradiaphragmatic and upper abdominal nodes, suspicious for metastasis. Redemonstrated intensely somatostatin avid bilobar hepatic lesions, consistent with metastases again morphologically difficult to delineate.   -- CT reviewed by [facility]: SINCE MAY 8 2024 INCREASED HEPATIC METASTASES, NEWLY SEEN PRIMARY TUMOR IN THE PANCREAS, and active bleeding within the duodenum with associated intraluminal hematoma.   -- chromogranin level is elevated at 2058, but no prior level at [facility] [place]   -- f/u with Dr. Sophia Prasad at [facility] after discharge, no plan for chemotherapy inpatient, if clinically improves/stabilizes then can be considered for treatment outpatient  --started on octreotide 250mcg TID    2. Duodenal bleed, Hemorrhagic shock  - Remains in MICU, s/p extubation 7/22. NG tube in place.  - CT w/ bleed in duodenum. GI called, EGD 7/9 -> S/p  IR embolization 7/9, intubated in MICU -> s/p extubation 7/15 -> intubated 7/18  - s/p multiple transfusions, fibrinogen low would recommend Cry for < 100, but coags have improved as are essentially normal now so unlikely, probably was related to liver dysfunction.   - s/p repeat EGD 7/12 showing duodenal lesions w/clots and oozing, no clear targets for intervention and no active bleeding, purastat applied to all areas of oozing.   - S/p EGD 7/18: duodenal ulcer with active oozing, ulcer with visible vessel. Bleeding treated.   - s/p multiple transfusions, per GI, high risk for rebleed, will continue PPI.   - Repeat CT AP w/ No evidence of GI bleed.  Interval endoscopic versus surgical intervention with metallic streak artifact suggestive of surgical clips in the region of the proximal one third portions of the duodenum. Evaluation of the previously seen hematoma is limited secondary to this artifact. Moderate bilateral pleural effusions and associated compressive atelectasis, right greater than left, overall slightly increased from previous CT. Anasarca.  - Per IR, In the absences of any active extravasation on CTA there's no place to target for an embolization  - GIB now stabilized, surgery continues to monitor. If significant recurrence of UGIB, surgery may consider emergent open duodenotomy   - Continue octreotide 250 mcg TID  - 7/29 sputum culture + Pseudomonas; will start abx if febrile    3. C. diff   - diarrhea now resolved   - Currently off abx    Will continue to follow.    Hugo Topete PA-C  Hematology/Oncology  [facility]  [phone] (office)

## 2024-08-01 NOTE — PROGRESS NOTE ADULT - SUBJECTIVE AND OBJECTIVE BOX
PATIENT: NANDO HERNANDEZ, MRN: [id number]    CHIEF COMPLAINT: Patient is a 79y old  Male who presents with a chief complaint of hypotension (01 Aug 2024 16:51)      Brief Course: 79M hx of metastatic neuroendocrine pancreatic ca and CAD s/p PCI x3 on 6/12/24, chronic afib previously on Eliquis, orthostatic hypotension on midodrine here initially for hypotension and RAZIA. Then developed hemorrhagic shock iso UGIB     INTERVAL HISTORY/OVERNIGHT EVENTS: No overnight events.       MEDICATIONS:  MEDICATIONS  (STANDING):  dextrose 50% Injectable 25 Gram(s) IV Push once  digoxin  Injectable 62.5 MICROGram(s) IV Push daily  folic acid Injectable 1 milliGRAM(s) IV Push daily  hydrocortisone sodium succinate Injectable 25 milliGRAM(s) IV Push daily  insulin lispro (ADMELOG) corrective regimen sliding scale   SubCutaneous every 6 hours  lidocaine 2% Gel 1 Application(s) Topical once  midodrine 10 milliGRAM(s) Oral every 8 hours  octreotide  Injectable 250 MICROGram(s) IV Push three times a day  pantoprazole  Injectable 40 milliGRAM(s) IV Push every 12 hours  thiamine Injectable 100 milliGRAM(s) IV Push daily    MEDICATIONS  (PRN):      ALLERGIES: Allergies    No Known Allergies    Intolerances        OBJECTIVE:  ICU Vital Signs Last 24 Hrs  T(C): 37.1 (01 Aug 2024 16:00), Max: 37.1 (01 Aug 2024 08:00)  T(F): 98.8 (01 Aug 2024 16:00), Max: 98.8 (01 Aug 2024 08:00)  HR: 122 (01 Aug 2024 18:00) (86 - 131)  BP: 97/53 (01 Aug 2024 18:00) (95/51 - 112/58)  BP(mean): 72 (01 Aug 2024 18:00) (69 - 82)  ABP: --  ABP(mean): --  RR: 18 (01 Aug 2024 18:00) (14 - 28)  SpO2: 98% (01 Aug 2024 18:00) (95% - 100%)    O2 Parameters below as of 01 Aug 2024 15:21  Patient On (Oxygen Delivery Method): room air            CAPILLARY BLOOD GLUCOSE      POCT Blood Glucose.: 128 mg/dL (01 Aug 2024 18:19)  POCT Blood Glucose.: 165 mg/dL (01 Aug 2024 12:11)  POCT Blood Glucose.: 124 mg/dL (01 Aug 2024 05:49)  POCT Blood Glucose.: 113 mg/dL (01 Aug 2024 00:18)    CAPILLARY BLOOD GLUCOSE      POCT Blood Glucose.: 128 mg/dL (01 Aug 2024 18:19)    I&O's Summary    31 Jul 2024 07:01  -  01 Aug 2024 07:00  --------------------------------------------------------  IN: 1066.6 mL / OUT: 600 mL / NET: 466.6 mL    01 Aug 2024 07:01  -  01 Aug 2024 21:13  --------------------------------------------------------  IN: 383.2 mL / OUT: 0 mL / NET: 383.2 mL      Daily     Daily     PHYSICAL EXAMINATION:  General: Comfortable, no acute distress, cooperative with exam.  HEENT: PERRLA  Respiratory: CTAB, normal respiratory effort, no coughing, wheezes, crackles, or rales.  CV: RRR, S1S2, no murmurs, rubs or gallops. No JVD. Distal pulses intact.  Abdominal: Soft, nontender, nondistended, no rebound or guarding, normal bowel sounds.  Neurology: AOx3, no focal neuro defects, LACY x 4.  Extremities: No pitting edema, + Peripheral pulses.      LABS:  ABG - ( 31 Jul 2024 15:55 )  pH, Arterial: 7.46  pH, Blood: x     /  pCO2: 34    /  pO2: 83    / HCO3: 24    / Base Excess: 0.5   /  SaO2: 99.2                                    7.9    8.86  )-----------( 124      ( 01 Aug 2024 00:47 )             25.2     08-01    147<H>  |  110<H>  |  28<H>  ----------------------------<  121<H>  3.7   |  24  |  0.79    Ca    7.5<L>      01 Aug 2024 00:47  Phos  2.3     08-01  Mg     1.6     08-01    TPro  4.2<L>  /  Alb  2.4<L>  /  TBili  0.7  /  DBili  x   /  AST  35  /  ALT  20  /  AlkPhos  144<H>  08-01    LIVER FUNCTIONS - ( 01 Aug 2024 00:47 )  Alb: 2.4 g/dL / Pro: 4.2 g/dL / ALK PHOS: 144 U/L / ALT: 20 U/L / AST: 35 U/L / GGT: x           PT/INR - ( 01 Aug 2024 00:47 )   PT: 12.2 sec;   INR: 1.11 ratio         PTT - ( 01 Aug 2024 00:47 )  PTT:25.1 sec        Urinalysis Basic - ( 01 Aug 2024 00:47 )    Color: x / Appearance: x / SG: x / pH: x  Gluc: 121 mg/dL / Ketone: x  / Bili: x / Urobili: x   Blood: x / Protein: x / Nitrite: x   Leuk Esterase: x / RBC: x / WBC x   Sq Epi: x / Non Sq Epi: x / Bacteria: x       PATIENT: NANDO HERNANDEZ, MRN: [id number]    CHIEF COMPLAINT: Patient is a 79y old  Male who presents with a chief complaint of hypotension (01 Aug 2024 16:51)      Brief Course: 79M hx of metastatic neuroendocrine pancreatic ca and CAD s/p PCI x3 on 6/12/24, chronic afib previously on Eliquis, orthostatic hypotension on midodrine here initially for hypotension and RAZIA. Then developed hemorrhagic shock iso UGIB with melena and coffee ground emesis. Patient's Hgb acutely dropped and was started on PPI and underwent EGD. Unable to control bleeding so now s/p IR GDA embolization. On repeat EGD, there was persistetnt oozing. Then had another melanotic stool with drop in Hgb of 3 points on 7/18 with EGD showing duodenal bleed which was clipped. Required 17u pRBC, 4 of platelets, and 5 FFP. No transfusions since 7/28. Course ccb RAZIA due to hypotension which is improving, C diff s/p vanc, and afib with RVR on digoxin at this time.    INTERVAL HISTORY/OVERNIGHT EVENTS: Pulled out Ng tube, willing to place another. Currently without melanotic stools or hematochezia. No chest pain, no SOB. Wants to get out of bed.      MEDICATIONS:  MEDICATIONS  (STANDING):  dextrose 50% Injectable 25 Gram(s) IV Push once  digoxin  Injectable 62.5 MICROGram(s) IV Push daily  folic acid Injectable 1 milliGRAM(s) IV Push daily  hydrocortisone sodium succinate Injectable 25 milliGRAM(s) IV Push daily  insulin lispro (ADMELOG) corrective regimen sliding scale   SubCutaneous every 6 hours  lidocaine 2% Gel 1 Application(s) Topical once  midodrine 10 milliGRAM(s) Oral every 8 hours  octreotide  Injectable 250 MICROGram(s) IV Push three times a day  pantoprazole  Injectable 40 milliGRAM(s) IV Push every 12 hours  thiamine Injectable 100 milliGRAM(s) IV Push daily    MEDICATIONS  (PRN):      ALLERGIES: Allergies    No Known Allergies    Intolerances        OBJECTIVE:  ICU Vital Signs Last 24 Hrs  T(C): 37.1 (01 Aug 2024 16:00), Max: 37.1 (01 Aug 2024 08:00)  T(F): 98.8 (01 Aug 2024 16:00), Max: 98.8 (01 Aug 2024 08:00)  HR: 122 (01 Aug 2024 18:00) (86 - 131)  BP: 97/53 (01 Aug 2024 18:00) (95/51 - 112/58)  BP(mean): 72 (01 Aug 2024 18:00) (69 - 82)  ABP: --  ABP(mean): --  RR: 18 (01 Aug 2024 18:00) (14 - 28)  SpO2: 98% (01 Aug 2024 18:00) (95% - 100%)    O2 Parameters below as of 01 Aug 2024 15:21  Patient On (Oxygen Delivery Method): room air            CAPILLARY BLOOD GLUCOSE      POCT Blood Glucose.: 128 mg/dL (01 Aug 2024 18:19)  POCT Blood Glucose.: 165 mg/dL (01 Aug 2024 12:11)  POCT Blood Glucose.: 124 mg/dL (01 Aug 2024 05:49)  POCT Blood Glucose.: 113 mg/dL (01 Aug 2024 00:18)    CAPILLARY BLOOD GLUCOSE      POCT Blood Glucose.: 128 mg/dL (01 Aug 2024 18:19)    I&O's Summary    31 Jul 2024 07:01  -  01 Aug 2024 07:00  --------------------------------------------------------  IN: 1066.6 mL / OUT: 600 mL / NET: 466.6 mL    01 Aug 2024 07:01  -  01 Aug 2024 21:13  --------------------------------------------------------  IN: 383.2 mL / OUT: 0 mL / NET: 383.2 mL      Daily     Daily     PHYSICAL EXAMINATION:  General: Tired appearing but interactive  Respiratory: CTAB, normal respiratory effort, no coughing, wheezes, crackles, or rales.  CV: Tachycardic, irregular rhythm  Abdominal: Soft, nontender, nondistended, no rebound or guarding  Neurology: AOx2-3, no focal neuro defects, LACY x 4. Tired but interactive  Extremities: Significant LE pitting edema    LABS:  ABG - ( 31 Jul 2024 15:55 )  pH, Arterial: 7.46  pH, Blood: x     /  pCO2: 34    /  pO2: 83    / HCO3: 24    / Base Excess: 0.5   /  SaO2: 99.2                                    7.9    8.86  )-----------( 124      ( 01 Aug 2024 00:47 )             25.2     08-01    147<H>  |  110<H>  |  28<H>  ----------------------------<  121<H>  3.7   |  24  |  0.79    Ca    7.5<L>      01 Aug 2024 00:47  Phos  2.3     08-01  Mg     1.6     08-01    TPro  4.2<L>  /  Alb  2.4<L>  /  TBili  0.7  /  DBili  x   /  AST  35  /  ALT  20  /  AlkPhos  144<H>  08-01    LIVER FUNCTIONS - ( 01 Aug 2024 00:47 )  Alb: 2.4 g/dL / Pro: 4.2 g/dL / ALK PHOS: 144 U/L / ALT: 20 U/L / AST: 35 U/L / GGT: x           PT/INR - ( 01 Aug 2024 00:47 )   PT: 12.2 sec;   INR: 1.11 ratio         PTT - ( 01 Aug 2024 00:47 )  PTT:25.1 sec        Urinalysis Basic - ( 01 Aug 2024 00:47 )    Color: x / Appearance: x / SG: x / pH: x  Gluc: 121 mg/dL / Ketone: x  / Bili: x / Urobili: x   Blood: x / Protein: x / Nitrite: x   Leuk Esterase: x / RBC: x / WBC x   Sq Epi: x / Non Sq Epi: x / Bacteria: x       PATIENT: NANDO HERNANDEZ, MRN: [id number]    CHIEF COMPLAINT: Patient is a 79y old  Male who presents with a chief complaint of hypotension (01 Aug 2024 16:51)      Brief Course: 79M hx of metastatic neuroendocrine pancreatic ca and CAD s/p PCI x3 on 6/12/24, chronic afib previously on Eliquis, orthostatic hypotension on midodrine here initially for hypotension and RAZIA. Then developed hemorrhagic shock iso UGIB with melena and coffee ground emesis. Patient was intubated at this time. Patient's Hgb acutely dropped and was started on PPI and underwent EGD. Unable to control bleeding so now s/p IR GDA embolization. On repeat EGD, there was persistent oozing. Then had another melanotic stool with drop in Hgb of 3 points on 7/18 with EGD showing duodenal bleed which was clipped. Also intubated again at this time. Required 17u pRBC, 4 of platelets, and 5 FFP. No transfusions since 7/28. Course ccb RAZIA due to hypotension which is improving, C diff s/p vanc, and afib with RVR on digoxin at this time.    INTERVAL HISTORY/OVERNIGHT EVENTS: Pulled out Ng tube, willing to place another. Currently without melanotic stools or hematochezia. No chest pain, no SOB. Wants to get out of bed.      MEDICATIONS:  MEDICATIONS  (STANDING):  dextrose 50% Injectable 25 Gram(s) IV Push once  digoxin  Injectable 62.5 MICROGram(s) IV Push daily  folic acid Injectable 1 milliGRAM(s) IV Push daily  hydrocortisone sodium succinate Injectable 25 milliGRAM(s) IV Push daily  insulin lispro (ADMELOG) corrective regimen sliding scale   SubCutaneous every 6 hours  lidocaine 2% Gel 1 Application(s) Topical once  midodrine 10 milliGRAM(s) Oral every 8 hours  octreotide  Injectable 250 MICROGram(s) IV Push three times a day  pantoprazole  Injectable 40 milliGRAM(s) IV Push every 12 hours  thiamine Injectable 100 milliGRAM(s) IV Push daily    MEDICATIONS  (PRN):      ALLERGIES: Allergies    No Known Allergies    Intolerances        OBJECTIVE:  ICU Vital Signs Last 24 Hrs  T(C): 37.1 (01 Aug 2024 16:00), Max: 37.1 (01 Aug 2024 08:00)  T(F): 98.8 (01 Aug 2024 16:00), Max: 98.8 (01 Aug 2024 08:00)  HR: 122 (01 Aug 2024 18:00) (86 - 131)  BP: 97/53 (01 Aug 2024 18:00) (95/51 - 112/58)  BP(mean): 72 (01 Aug 2024 18:00) (69 - 82)  ABP: --  ABP(mean): --  RR: 18 (01 Aug 2024 18:00) (14 - 28)  SpO2: 98% (01 Aug 2024 18:00) (95% - 100%)    O2 Parameters below as of 01 Aug 2024 15:21  Patient On (Oxygen Delivery Method): room air            CAPILLARY BLOOD GLUCOSE      POCT Blood Glucose.: 128 mg/dL (01 Aug 2024 18:19)  POCT Blood Glucose.: 165 mg/dL (01 Aug 2024 12:11)  POCT Blood Glucose.: 124 mg/dL (01 Aug 2024 05:49)  POCT Blood Glucose.: 113 mg/dL (01 Aug 2024 00:18)    CAPILLARY BLOOD GLUCOSE      POCT Blood Glucose.: 128 mg/dL (01 Aug 2024 18:19)    I&O's Summary    31 Jul 2024 07:01  -  01 Aug 2024 07:00  --------------------------------------------------------  IN: 1066.6 mL / OUT: 600 mL / NET: 466.6 mL    01 Aug 2024 07:01  -  01 Aug 2024 21:13  --------------------------------------------------------  IN: 383.2 mL / OUT: 0 mL / NET: 383.2 mL      Daily     Daily     PHYSICAL EXAMINATION:  General: Tired appearing but interactive  Respiratory: CTAB, normal respiratory effort, no coughing, wheezes, crackles, or rales.  CV: Tachycardic, irregular rhythm  Abdominal: Soft, nontender, nondistended, no rebound or guarding  Neurology: AOx2-3, no focal neuro defects, LACY x 4. Tired but interactive  Extremities: Significant LE pitting edema    LABS:  ABG - ( 31 Jul 2024 15:55 )  pH, Arterial: 7.46  pH, Blood: x     /  pCO2: 34    /  pO2: 83    / HCO3: 24    / Base Excess: 0.5   /  SaO2: 99.2                                    7.9    8.86  )-----------( 124      ( 01 Aug 2024 00:47 )             25.2     08-01    147<H>  |  110<H>  |  28<H>  ----------------------------<  121<H>  3.7   |  24  |  0.79    Ca    7.5<L>      01 Aug 2024 00:47  Phos  2.3     08-01  Mg     1.6     08-01    TPro  4.2<L>  /  Alb  2.4<L>  /  TBili  0.7  /  DBili  x   /  AST  35  /  ALT  20  /  AlkPhos  144<H>  08-01    LIVER FUNCTIONS - ( 01 Aug 2024 00:47 )  Alb: 2.4 g/dL / Pro: 4.2 g/dL / ALK PHOS: 144 U/L / ALT: 20 U/L / AST: 35 U/L / GGT: x           PT/INR - ( 01 Aug 2024 00:47 )   PT: 12.2 sec;   INR: 1.11 ratio         PTT - ( 01 Aug 2024 00:47 )  PTT:25.1 sec        Urinalysis Basic - ( 01 Aug 2024 00:47 )    Color: x / Appearance: x / SG: x / pH: x  Gluc: 121 mg/dL / Ketone: x  / Bili: x / Urobili: x   Blood: x / Protein: x / Nitrite: x   Leuk Esterase: x / RBC: x / WBC x   Sq Epi: x / Non Sq Epi: x / Bacteria: x

## 2024-08-01 NOTE — PROGRESS NOTE ADULT - PROBLEM SELECTOR PLAN 3
Patient initially with RAZIA in the setting of hypotension possibly related to the hemorrhagic shock but now recovering. Patient with chronic afib unable to be on AC for this and seemingly minimally responsive to amiodarone. Cardiology following, no obvious underlying cause aside from hemorrhage, which is difficult to control.  - Continue digoxin per cardiology  - F/u repeat level on 8/3  - F/u cardiology recs

## 2024-08-01 NOTE — PROGRESS NOTE ADULT - ASSESSMENT
Assessment	  Assessment: 79 yo male with metastatic neuroendocrine pancreatic cancer (tx on hold) and PMH of CAD s/p PCI x3 with most recent stent 6/12/24 on Plavix and eliquis , chronic Afib on eliquis, orthostatic hypotension on midodrine, PAD , recent admission for dx cath on 6/24/24 presented from PCP's office for hypotension despite taking AM midodrine dose on 7/2, admitted to medicine for symptomatic hypotension and RAZIA. Per chart, on 7/8 PM, pt was noted to have acute onset of melena x5 and coffee ground emesis x2-3. RRT was called on 7/9 AM for hypotension, hgb dropped from 9.2 to 7.4 (admission baseline 10-11), FOBT +, pt was started on PPI IV and transfused 1 unit of pRBC. GI was consulted and underwent EGD on 7/9 afternoon. MICU was consulted for uncontrolled bleeding during EGD. During EGD, pt became hypotensive and was given phenylephrine, had A-fib with RVR s/p amiodarone. Now s/p IR GDA embolization, admitted to MICU for further monitoring i.s.o hemorrhagic shock 2/2 GI bleed. On repeat EGD, oozing but no active bleeding was noted. Hospital course c/b c.diff, and patient has been placed on vancomycin. Patient had melanotic stool 7/18 with Hgb drop 10.1 to 7.1 and received 2 units pRBC and 1 unit platelets. Patient was reintubated and underwent EGD 7/18 afternoon. GI found duodenal bleed. Patient now s/p 3x clips and epi. Since 7/18 patient has continued to have melanotic stools with hemoglobin drops concerning for slow rebleeding. Currently, discussion revolves around the risks and benefits of antiplatelet therapy for recent stent vs risk of GI rebleed, and potential to undergo surgery.    - Total transfusions: 17 pRBC, 4 platelet, 5 FFP (as of 7/28)    Plan:     NEUROLOGIC  # Baseline AOx3 (waxing/waning)  Course:  - intubated 7/9, extubated 7/15  - reintubated 7/18 for EGD, extubated 7/22  -reintubated 7/23, extubated  - not sedated  - Currently AOx2-3  Plan:  - Delirium precautions    CARDIOVASCULAR  # hemorrhagic shock (worsened)  Course:  - Per spouse, pt's baseline BP is a little bit over 100  - 7/9: RRT called 7/9 for hypotension; 2/2 to Upper GI bleeds, urgently took to EGD, found bleeding ulcer that was not well controlled, underwent IR GDA embolization  - 7/18, patient had large melanotic stool with Hgb drop from 10.1-7.1, patient was restarted on henny, vaso, and levo for reintubation for GI scope; s/p 2pRBC, 1 platelet  - Total transfusions: 16 pRBC, 4 platelet, 5 FFP  Findings:  - AM cortisol 17 (7/5), AM cortisol 37.1 (7/11)  - POCUS ECHO (7/16) showed no cardiogenic shock, no tamponade, low SV indeterminate IVC, irregular B lines but not concerning for pulmonary edema, and some subdiaphragmatic fluid  Plan:  - Continue to monitor CBC, transfuse for Hgb <7  - Continue to hold Plavix 75 mg  - Off pressors    #CAD s/p PCI x3, most recent stent 6/12/24, NURIA to pLAD (stable)  Course:  - 7/13 restarted plavix for new stent (6/12/24) per GI, 7/18 held plavix due to bleeding  - 7/22 started low-dose aspirin as safer alternative to plavix per cardiology, but d/c 7/23 as patient bled again  Plan:  - Continue to hold Plavix 75 mg and aspirin 81 mg      #PAD  # A-fib on eliquis (worsening)  May have been worsened because of GI bleed  - s/p successful DCCV 10/31   - 7/16 In RVR, 2 doses of amio overnight; Cards recommended resuming home dose sotalol 40 mg PO (qTC wnl), s/p 1 dose sotalol but RVR persisted  - 7/16 Started amio loading  - 7/22 Added digoxin load for persistent HR 130s over past week  - 7/23 Dig level 1.3, patient's HR back in 60s in the afternoon  - 7/28 Patient pulled NG tube, unable to get several doses of amio, back in afib with RVR  - 7/30 NGT replaced - was pulled out later in the PM  Plan:  - Hold eliquis   - Hold home sotalol  - Cardiology following, advises against chemical cardioversion off AC if possible  - digoxin lvl 1.3  - c/w digoxin 62.5 MICROgrams IV push daily   - Repeat digoxin level on 8/3    #NSVT (stable)  Likely 2/2 cardiac structural damage given prior stents as well as electrolyte abnormalities while being diuresed. Troponins elevated but stable today. Troponins also elevated earlier in admission. EKG without ST elevations.  Plan:  - Continue to monitor  - Replete electrolytes as necessary    PULMONARY  #Intubated for airway protection prior to EGD/IR embolization (resolved)  Course:  - Extubated 7/22, SpO2 100% on RA, reintubated 7/27, Extubated 7/28    RENAL/  #RAZIA (resolved)  - Cr peaked at 3.38 (7/11)  - Currently Cr 1.13 ( 7/24)  Plan:  - Continue to monitor Cr    #ATN i.s.o hypotension  #metabolic acidosis (improving)  Patient was in a state of metabolic acidosis around 7/16, but recent ABG pH in normal range. However, patient has been bicarb deficient throughout the admission.   Plan:  - f/u nephro recs  - hold sodium bicarb 650mg tid considering improvement in hypernatremia and bicarb lvls  - Per nephro, patient is not a dialysis candidate  - trend BUN/Cr   - monitor Is and Os     #Hypernatremia (improving)  Patient having a gradually increasing sodium since transfer to the MICU. Na was improving by adding free water, lowering the bicarb drip, and diuresis with bumex/metolazone. Now downtrending, will hold diuresis.  Plan:  - stopped free water 300mL q6  - hold sodium bicarb 650mg TID given improvement  - hold bumex 1mg q12 IV, metolazone 5mg PO QD    #Urinary retention (improving)  Plan:  - Started Doxazosin 2 mg daily (7/15)  - Monitor I/Os  - Tavarez placed by urology 7/19, making urine  - Removed tavarez 7/30    GASTROINTESTINAL  #Upper GI Bleed (stable)  Course:  - 7/9 CT A/P - Active bleeding in the third portion of the duodenum. Progression of liver metastases.  - 7/9 EGD showed esophagitis, One non-bleeding duodenal ulcer with a clean ulcer base (Arjun Class III). One oozing duodenal ulcer with spurting hemorrhage (Arjun Class Ia). Treatment not successful. Treated with bipolar cautery.  - 7/9 s/p IR GDA embolization  - 7/18 Patient had melanotic stool with hemoglobin drop 10.1 to 7.1; EGD showing duodenal bleed s/p 3 clips + epi  - CTA (7/20) showed no active bleed  - H. pylori negative  - Total EGDs: 3  Plan:  - Continue pantoprazole 40 mg IV q12  - monitor Hgb, transfuse as needed for Hgb <7  - continue to f/u with GI and IR     - Per GI, "Will need PPI BID for 8 weeks for grade D esophagitis and PUD"    - Per GI, high risk of 30 day rebleed (>10-20% risk)  - No acute IR intervention per IR because no active area of extravasation on imaging (7/21)  - No acute GI intervention per GI (7/21)  - Surgery evaluated patient 7/23, may be a candidate for duodenotomy to oversew ulcers, but patient cannot be on aspirin  - Reached out to GI (7/26) about longer-term goals including risk of not being on aspirin vs bleeding;  ·	Per GI, still a risks vs benefits situation as he may bleed again with ASA, patient may require a surgical intervention next  - As per heme/onc, c/w octreotide 250mg IV TID    #Nutrition  Course:  - FEES (7/17) failed  - ENT consulted for vallecular lesion, diagnosed vallecular cyst, no inpatient intervention required, outpatient f/u  - Patient currently extubated (7/24)  - FEES failed 7/29; patient refused evaluation  - NGT replaced (7/30)- was given 1mg ativan, nasal lidocaine gel, affrin prior to NGT insertion  - Speech/swallow today 8/1    Plan:  - No plan to replace, update wife, discuss GOC    #Diarrhea (resolved)  Course:  - c. diff + (7/14)  - vancomycin (7/14 -7/23) active infection dose, vancomycin prophylaxis (7/24-7/25)   Plan:  - No longer on vancomycin  - Infection prevention says contact precautions continued due to mucoid BMs    #Transaminitis (worsened)  Findings:  - Bili 1.1 (7/16) -> 1.3 (7/17) ->0.5 (7/24) -> 0.6 (7/28)  - Alk P 359 (7/16)-> 484 (7/17) -> 174 (7/24) -> 229 (7/28)  -  (7/16)->129 (7/17) -> 75 (7/24) -> 65 (7/28)  - ALT 67 (7/16)-> 72 (7/17) -> 30 (7/24) -> 229 (7/28)  - RUQ US (7/17): known liver mets, no acute findings  Plan:  - Continue atorvastatin 80 mg daily    INFECTIOUS DISEASE  #leukocytosis (resolved)  Cultures:  - Blood cultures negative throughout admission  - Sputum Cx from ETT 7/13 showed numerous gram neg krishna (Klebsiella oxytoca/raoultella ornithinolytica)  - GI PCR negative  - MRSA swab negative  - 7/29 sputum culture growing P aeruginosa   Antibiotics Course:  - Vancomycin (7/14 - 7/23), Vancomycin Prophylaxis (7/24 -7/25)  - Zosyn (7/11-7/18), resistance shown, switched to meropenem  - Meropenem (7/18-7/25)  Plan:  - No longer on antibiotics  - Continue to monitor for fevers  - If febrile start abx for pseudomonas    ENDOCRINE  #adrenal insufficiency (stable)  Findings:  - 7/5 cortisol 17   - 7/11 cortisol 37.1   Plan:  - Taper hydrocortisone 25mg QD for 1 days  - Monitor sugars with steroid    HEMATOLOGY/ONCOLOGY   # neuroendocrine tumor (unchanged)  - was on lanreotide then had POD in liver and started on peptide receptor radiotherapy (PRRT)- typically given every 8 weeks for 4 doses. He last received it 10/20/2023   - PET 5/28/24 shows new somatostatin avid osseous lesions; decreased intensely avid right supradiaphragmatic and upper abdominal nodes, suspicious for mets  - Gastrin level 5509  Plan:  - per heme/onc:     - can check factor levels V, VIII, X     - "f/u with Dr. Sophia Prasad at [facility] [place] after discharge, no plan for treatment inpatient, if clinically improves/stabilizes then can be considered for treatment outpatient"     - c/w octreotide 250 mcg TID    #Normocytic anemia (stable)  - Pt with low Hgb 7-9  - ISO of GI bleed hx and continued melanotic BMs, there is c/o of rebleed; per GI, high risk of rebleeding, currently seems to be slow bleeding   Plan:  - Surgery and GI made aware & are following   - Continue CBC q12 unless having large melanotic stools    #DVT ppx (unchanged)  - SCDs  - LE duplex (7/17): negative for DVT    SKINS:   - 2x peripheral IV right arm    Ethics:   - Full Code   - GOC 7/12, spoke to spouse (Yamilet) over phone with palliative care team, discussed GOC again on 7/16  - 7/29 - Patient expressed that he wants to die, refuses to see psych, equivocating on DNR status.   - Pulled NGT for 3rd time on 7/30  - Continue GOC discussion.    Consults this admission: GI, Heme Onc, Palliative, Endocrinology, Cardiology, Nephrology, Interventional Radiology, General Surgery

## 2024-08-01 NOTE — PROGRESS NOTE ADULT - PROBLEM SELECTOR PLAN 10
DVT: unable to be on anything but SCDs  Diet: Unable to tolerate diet and repeatedly removing NGT but does seem to intermittently have capacity so not currently restraining patient either  GOC: Full code

## 2024-08-01 NOTE — PROGRESS NOTE ADULT - ATTENDING COMMENTS
79M PMHx metastatic neuroendocrine pancreatic ca, CAD s/p PCI x3 6/12/24, afib previously on eliquis, orthostatic hypotension on midodrine. Pt was initially admitted to this hospital 7/2 with symptomatic hypotension despite his usual midodrine dose, thought to be d/t hypovolemic hypotension. Hospital course c/b acute GIB, and pt upgraded to MICU. Pt underwent EGD but unable to control bleeding, underwent IR guided embolization. MICU course c/b hemorrhagic shock requiring total 17u prbc (+ 4u plt, 5u ffp), afib rvr, c.diff s/p po vanc. Pt was was intubated 7/9, extubated 7/15, re-intubated for EGD on 7/18, extubated 7/22. Pt downgraded to floor on 8/1 evening. Pt became febrile overnight.     #Sepsis  -pt with 101F fever on the floor. Met SIRS criteria for fever and HR.   -pt with multiple comorbidities, including 2 intubations this hospitalization.   -pt with increased risk for aspiration given multiple failed SLP evaluation   -will start cefepime tonight. Monitor closely. f/u CXR.   -consult ID in the morning    #acute hemorrhagic shock from UGIB, iso chronic orthostatic hypotension on midodrine  -patient with multiple transfusions this hospitalization. Hgb had been stable prior to downgrade.   -continue to monitor for melena/hematmemsis, daily CBC, keep active T&S  -unable to give midodrine due to no po access at this time.   -c/w ppi   -c/w stress dose steroid taper   -f/u GI re: AC/AP recs     #failed SLP study  #NPO   -pt intubated twice in MICU, and since last extubated on 7/22, failed multiple SLP studies (including FEES), recommending NPO for now.   -pt pulled out NGT multiple times and unable to give medications including midodrine.   -F/u speech pathology in the morning to clarify re: barium swallow study given last note from SLP states pt should get instrumental evaluation.   -recommend ongoing discussion with patient and family re: difficulty with NGT and lack of PO access for medications and nutrition.     #afib, intermittently in rvr  -difficult to control HR given pt's underlying condition and hypotension  -also unable to AC d/t recent hemorrhagic shock from UGIB      #CAD with recent PCI in June  -pt unable to take any AC or AP d/t hemorrhagic shock from UGIB  -pt at high risk, monitor closely for sx.     #metastatic pancreatic NET  -c/w octreotide       Overall, patient with poor prognosis with multiple comorbidities. Pt had multiple GOC discussion throughout this hospitalization and both patient and wife wish pt to be FULL CODE. 79M PMHx metastatic neuroendocrine pancreatic ca, CAD s/p PCI x3 6/12/24, afib previously on eliquis, orthostatic hypotension on midodrine. Pt was initially admitted to this hospital 7/2 with symptomatic hypotension despite his usual midodrine dose, thought to be d/t hypovolemic hypotension. Hospital course c/b acute GIB, and pt upgraded to MICU. Pt underwent EGD but unable to control bleeding, underwent IR guided embolization. MICU course c/b hemorrhagic shock requiring total 17u prbc (+ 4u plt, 5u ffp), afib rvr, c.diff s/p po vanc. Pt was was intubated 7/9, extubated 7/15, re-intubated for EGD on 7/18, extubated 7/22. Pt downgraded to floor on 8/1 evening. Pt became febrile overnight.     #Sepsis  -pt with 101F fever on the floor. Met SIRS criteria for fever and HR.   -pt with multiple comorbidities, including 2 intubations this hospitalization.   -pt with increased risk for aspiration given multiple failed SLP evaluation   -will start cefepime tonight. Monitor closely. f/u CXR.   -consult ID in the morning    #acute hemorrhagic shock from UGIB, iso chronic orthostatic hypotension on midodrine  -patient with multiple transfusions this hospitalization. Hgb had been stable prior to downgrade.   -continue to monitor for melena/hematmemsis, daily CBC, keep active T&S  -unable to give midodrine due to no po access at this time.   -c/w ppi   -c/w stress dose steroid taper   -f/u GI re: AC/AP recs     #failed SLP study  #NPO   -pt intubated twice in MICU, and since last extubated on 7/22, failed multiple SLP studies (including FEES), recommending NPO for now.   -pt pulled out NGT multiple times and unable to give medications including midodrine.   -F/u speech pathology in the morning to clarify re: barium swallow study given last note from SLP states pt should get instrumental evaluation.   -recommend ongoing discussion with patient and family re: difficulty with NGT and lack of PO access for medications and nutrition.     #afib, intermittently in rvr  -difficult to control HR given pt's underlying condition and hypotension  -also unable to AC d/t recent hemorrhagic shock from UGIB    #CAD with recent PCI in June  -pt unable to take any AC or AP d/t hemorrhagic shock from UGIB  -pt at high risk, monitor closely for sx.     #metastatic pancreatic NET  -c/w octreotide     Overall, patient with poor prognosis with multiple comorbidities. Pt had multiple GOC discussion throughout this hospitalization and both patient and wife wish pt to be FULL CODE.  Recommend PT when pt is stabilized.

## 2024-08-01 NOTE — PROGRESS NOTE ADULT - PROBLEM SELECTOR PLAN 1
Patient with persistent UGIB with coffee ground emesis and melanotic stools multiple times requiring IR embolization initially and then EGD with clipping for duodenal ulcer. Patient has been off of his plavix for his very recent stent and Eliquis for his chronic afib since he has required so many repeat transfusions due to this active blood loss. The hemorrhagic shock seems to have temporarily resolved. No transfusions since 7/28. Hgb seems to be stable as well.  - Hold all AC and anti-platelet agents  - Protonix BID and octreotide  - Patient with persistent UGIB with coffee ground emesis and melanotic stools multiple times requiring IR embolization initially and then EGD with clipping for duodenal ulcer. Patient has been off of his aspirin and plavix for his very recent stent and Eliquis for his chronic afib since he has required so many repeat transfusions due to this active blood loss. The hemorrhagic shock seems to have temporarily resolved. No transfusions since 7/28. Hgb seems to be stable as well.  - Hold all AC and anti-platelet agents  - Protonix BID per GI  - Maintain T&S, CBCs Patient prolonged hospitalization and now with fevers and tachycardia. Poor cough especially in the setting of repeated intubations and extubations last extubated on 7/28. Not feeling SOB, but will get imaging, cultures, and start abx  - Not MDRO pseudomonas in last sputum culture, so will start with one agent for now. Cefepime 2g q8 for 7d  - Bcx  - UA  - CXR

## 2024-08-01 NOTE — CHART NOTE - NSCHARTNOTEFT_GEN_A_CORE
MICU follow up requested due to afib with RVR. Patient was seen and evaluated at bedside with MICU attending Dr. Amaya. Patient comfortable, easily arousable. He denies current chest pain, SOB, palpitations, dizziness, lightheadedness.     Last set of vitals, BP 97/53 (MAP 72), . Telemetry reviewed - afib w/ rates 110s.    Vital Signs Last 24 Hrs  T(C): 37.1 (01 Aug 2024 16:00), Max: 37.1 (01 Aug 2024 08:00)  T(F): 98.8 (01 Aug 2024 16:00), Max: 98.8 (01 Aug 2024 08:00)  HR: 122 (01 Aug 2024 18:00) (86 - 131)  BP: 97/53 (01 Aug 2024 18:00) (95/51 - 112/58)  BP(mean): 72 (01 Aug 2024 18:00) (69 - 82)  RR: 18 (01 Aug 2024 18:00) (14 - 28)  SpO2: 98% (01 Aug 2024 18:00) (95% - 100%)    GENERAL: NAD  LUNG: CTAB, no wheezes, no rhonchi, no accessory muscle use  HEART: no m/r/g  EXTREMITIES:  +edema. warm and well perfused  NEUROLOGY: easily arousable, answering questions appropriately  SKIN: No rashes or lesions      Assessment and recommendations:  80 yo male with metastatic neuroendocrine pancreatic cancer (tx on hold) and PMH of CAD s/p PCI x3 with most recent stent 6/12/24 on Plavix and Eliquis, chronic Afib on Eliquis, orthostatic hypotension on midodrine, PAD, recent admission for dx cath on 6/24/24 presented from PCP's office for hypotension, found to have GIB s/p EGD w/ hemostasis, stable CBC. Patient completed course of abx for CRE Kleb in sputum. He is pending a repeat SLP eval.    - Telemetry reviewed for patient while on 4Mon - rates 110s  - Blood pressure at baseline, SBPs 90s-100s; patient is mentating appropriately  - Cardiology recommendations reviewed  - As per cardiology, digoxin 62.5mcg IVP daily (ordered)  - Check dig level w/ timing as per cardiology  - Chemical cardioversion is not recommended due to patient's inability to tolerate full AC  - Maintain MAP>65  - If needed, recommend placement of NGT by primary team for midodrine 10mg q8h - patient has tolerated past 24 hours within midodrine given inability to establish enteric access  - Follow up SLP kike w/ IDA    Patient is not a MICU candidate at this time. Please reconsult as needed  Please see attending attestations for final recommendations. MICU follow up requested due to afib with RVR. Patient was seen and evaluated at bedside with MICU attending Dr. Amaya. Patient comfortable, easily arousable. He denies current chest pain, SOB, palpitations, dizziness, lightheadedness.     Last set of vitals, BP 97/53 (MAP 72), . Telemetry reviewed - afib w/ rates 110s.    Vital Signs Last 24 Hrs  T(C): 37.1 (01 Aug 2024 16:00), Max: 37.1 (01 Aug 2024 08:00)  T(F): 98.8 (01 Aug 2024 16:00), Max: 98.8 (01 Aug 2024 08:00)  HR: 122 (01 Aug 2024 18:00) (86 - 131)  BP: 97/53 (01 Aug 2024 18:00) (95/51 - 112/58)  BP(mean): 72 (01 Aug 2024 18:00) (69 - 82)  RR: 18 (01 Aug 2024 18:00) (14 - 28)  SpO2: 98% (01 Aug 2024 18:00) (95% - 100%)    GENERAL: NAD  LUNG: CTAB, no wheezes, no rhonchi, no accessory muscle use  HEART: no m/r/g  EXTREMITIES:  +edema. warm and well perfused  NEUROLOGY: easily arousable, answering questions appropriately  SKIN: No rashes or lesions      Assessment and recommendations:  78 yo male with metastatic neuroendocrine pancreatic cancer (tx on hold) and PMH of CAD s/p PCI x3 with most recent stent 6/12/24 on Plavix and Eliquis, chronic Afib on Eliquis, orthostatic hypotension on midodrine, PAD, recent admission for dx cath on 6/24/24 presented from PCP's office for hypotension, found to have GIB s/p EGD w/ hemostasis, stable CBC. Patient completed course of abx for CRE Kleb in sputum. He is pending a repeat SLP eval.    - Telemetry reviewed for patient while on 4Mon - rates 110s  - Blood pressure at baseline, SBPs 90s-100s; patient is mentating appropriately  - Cardiology recommendations reviewed  - As per cardiology, digoxin 62.5mcg IVP daily (ordered)  - Check dig level w/ timing as per cardiology  - Chemical cardioversion is not recommended due to patient's inability to tolerate full AC  - Maintain MAP>65  - If needed, recommend placement of NGT by primary team for midodrine 10mg q8h - patient has tolerated past 24 hours within midodrine given inability to establish enteric access  - Follow up SLP eval w/ MBS    Patient is not a MICU candidate at this time. Please reconsult as needed  Please see attending attestations for final recommendations.           CCM Attending:   Pt seen and examined at bedside with ICU Consult Resident.  - S/P Upper GIB for Duodenal Ulcer stable H/H on RAO2 SpO2 98%  - Transfer out of ICU a couple of hours ago found low A.Fib -120HR   - Hemodynamically stable as above but missed Midodrine last 24 hr since NGT pull out by patient   - Reinsert NGT to restart Midodrine for Chronic Postural Hypotension   - Follow management with Cardiology service   - No ICU readmission/monitoring indicated at this time      Patient seen and examined with ICU Resident/Fellow at bedside after lab data, medical records and radiology reports reviewed. I have read and agreeable in general with resident's Documented Note, Assessment and Management Plan which reflected my opinions from bedside round and discussion.

## 2024-08-01 NOTE — PROGRESS NOTE ADULT - SUBJECTIVE AND OBJECTIVE BOX
Patient is a 79y old  Male who presents with a chief complaint of hypotension (01 Aug 2024 12:02)    Patient seen and examined in MICU, feeling okay.  MEDICATIONS  (STANDING):  dextrose 50% Injectable 25 Gram(s) IV Push once  digoxin  Injectable 62.5 MICROGram(s) IV Push daily  folic acid Injectable 1 milliGRAM(s) IV Push daily  hydrocortisone sodium succinate Injectable 25 milliGRAM(s) IV Push daily  insulin lispro (ADMELOG) corrective regimen sliding scale   SubCutaneous every 6 hours  midodrine 10 milliGRAM(s) Oral every 8 hours  octreotide  Injectable 250 MICROGram(s) IV Push three times a day  pantoprazole  Injectable 40 milliGRAM(s) IV Push every 12 hours  thiamine Injectable 100 milliGRAM(s) IV Push daily    MEDICATIONS  (PRN):    Vital Signs Last 24 Hrs  T(C): 37.1 (01 Aug 2024 08:00), Max: 37.1 (01 Aug 2024 08:00)  T(F): 98.8 (01 Aug 2024 08:00), Max: 98.8 (01 Aug 2024 08:00)  HR: 111 (01 Aug 2024 12:00) (86 - 115)  BP: 107/55 (01 Aug 2024 12:00) (95/51 - 112/58)  BP(mean): 79 (01 Aug 2024 12:00) (69 - 82)  RR: 21 (01 Aug 2024 12:00) (14 - 28)  SpO2: 99% (01 Aug 2024 12:00) (98% - 100%)    Parameters below as of 01 Aug 2024 08:00  Patient On (Oxygen Delivery Method): room air    PE  NAD  Awake, alert  Anicteric, MMM  No c/c/e  No rash grossly                        7.9    8.86  )-----------( 124      ( 01 Aug 2024 00:47 )             25.2       08-01    147<H>  |  110<H>  |  28<H>  ----------------------------<  121<H>  3.7   |  24  |  0.79    Ca    7.5<L>      01 Aug 2024 00:47  Phos  2.3     08-01  Mg     1.6     08-01    TPro  4.2<L>  /  Alb  2.4<L>  /  TBili  0.7  /  DBili  x   /  AST  35  /  ALT  20  /  AlkPhos  144<H>  08-01

## 2024-08-01 NOTE — PROGRESS NOTE ADULT - PROBLEM SELECTOR PLAN 7
Patient with T2DM although also with adrenal insufficiency on hydrocortisone taper.  - Continue to taper hydrocortisone, on LDSSI given currently NPO Patient with failure to thrive iso metastatic disease as well as repeated intubation and difficulty swallowing so pending barium swallow  - Barium swallow f/u

## 2024-08-01 NOTE — PROGRESS NOTE ADULT - SUBJECTIVE AND OBJECTIVE BOX
DATE OF SERVICE: 08-01-24 @ 16:52    Patient is a 79y old  Male who presents with a chief complaint of hypotension (01 Aug 2024 15:02)      INTERVAL HISTORY: In no acute distress.     REVIEW OF SYSTEMS:  CONSTITUTIONAL: No weakness  EYES/ENT: No visual changes;  No throat pain   NECK: No pain or stiffness  RESPIRATORY: No cough, wheezing; No shortness of breath  CARDIOVASCULAR: No chest pain or palpitations  GASTROINTESTINAL: No abdominal  pain. No nausea, vomiting, or hematemesis  GENITOURINARY: No dysuria, frequency or hematuria  NEUROLOGICAL: No stroke like symptoms  SKIN: No rashes    TELEMETRY Personally reviewed:  	  MEDICATIONS:  digoxin  Injectable 62.5 MICROGram(s) IV Push daily  midodrine 10 milliGRAM(s) Oral every 8 hours        PHYSICAL EXAM:  T(C): 37.1 (08-01-24 @ 08:00), Max: 37.1 (08-01-24 @ 08:00)  HR: 131 (08-01-24 @ 15:21) (86 - 131)  BP: 107/55 (08-01-24 @ 12:00) (95/51 - 112/58)  RR: 21 (08-01-24 @ 12:00) (14 - 28)  SpO2: 95% (08-01-24 @ 15:21) (95% - 100%)  Wt(kg): --  I&O's Summary    31 Jul 2024 07:01  -  01 Aug 2024 07:00  --------------------------------------------------------  IN: 1066.6 mL / OUT: 600 mL / NET: 466.6 mL    01 Aug 2024 07:01  -  01 Aug 2024 16:52  --------------------------------------------------------  IN: 383.2 mL / OUT: 0 mL / NET: 383.2 mL          Appearance: In no distress	  HEENT:    PERRL, EOMI	  Cardiovascular:  S1 S2, No JVD  Respiratory: Lungs clear to auscultation	  Gastrointestinal:  Soft, Non-tender, + BS	  Vascularature:  + edema of LE  Psychiatric: Appropriate affect   Neuro: no acute focal deficits                               7.9    8.86  )-----------( 124      ( 01 Aug 2024 00:47 )             25.2     08-01    147<H>  |  110<H>  |  28<H>  ----------------------------<  121<H>  3.7   |  24  |  0.79    Ca    7.5<L>      01 Aug 2024 00:47  Phos  2.3     08-01  Mg     1.6     08-01    TPro  4.2<L>  /  Alb  2.4<L>  /  TBili  0.7  /  DBili  x   /  AST  35  /  ALT  20  /  AlkPhos  144<H>  08-01        Labs personally reviewed      ASSESSMENT/PLAN: 	    78-year-old male multiple medical problems history of hepatocellular carcinoma status post radiation therapy, AF s/p DCCV on Eliquis who was found to be hypotensive following NST. Patient has reported general weakness, fatigue, lightheadedness since being discharged from hospital. Reports mild chest discomfort in midsternal region. Reports dyspnea with minimal exertion. Also reports significant lack of appetite and poor PO intake. No dark or bloody stool, nausea or vomiting.       Problem/Plan - 1:  ·  Problem: Hypotension  ·  Plan: Continue with pressors in MICU  - Patient presents with hypotension and also RAZIA. Has known orthostatic hypotension.   - Patient and wife report decreased PO intake likely 2/2 malignancy.  - GIB 7/9, s/p 4 units prbc, IR for mesenteric embolization, now in MICU on pressors  - c/w Midodrine 10mg PO [id number]     Problem/Plan - 2:  ·  Problem: CAD.   ·  Plan: Recent PCI to pLAD in June 2023  - ECG non-ischemic  - Plavix held given large melena; ideally should be on Plavix but high risk to resume      Problem/Plan - 3:  ·  Problem: Atrial Fibrillation  - s/p succesful DCCV 10/31  - Hold Eliquis 5mg BID given GIB  - s/p Amio  - However would advise against chemical cardioversion off AC if possible  - HR elevated. Dig increased to 62.5mcg IVP daily        Sulma Inverness, AG-NP   Hank Javdan, DO [facility]  Cardiovascular Medicine  [address] [place] [address], [address]  Available through call or text on [employer]  Office: [phone]   DATE OF SERVICE: 08-01-24 @ 16:52    Patient is a 79y old  Male who presents with a chief complaint of hypotension (01 Aug 2024 15:02)      INTERVAL HISTORY: In no acute distress.     REVIEW OF SYSTEMS:  CONSTITUTIONAL: No weakness  EYES/ENT: No visual changes;  No throat pain   NECK: No pain or stiffness  RESPIRATORY: No cough, wheezing; No shortness of breath  CARDIOVASCULAR: No chest pain or palpitations  GASTROINTESTINAL: No abdominal  pain. No nausea, vomiting, or hematemesis  GENITOURINARY: No dysuria, frequency or hematuria  NEUROLOGICAL: No stroke like symptoms  SKIN: No rashes    TELEMETRY Personally reviewed: AF 90-140s  	  MEDICATIONS:  digoxin  Injectable 62.5 MICROGram(s) IV Push daily  midodrine 10 milliGRAM(s) Oral every 8 hours        PHYSICAL EXAM:  T(C): 37.1 (08-01-24 @ 08:00), Max: 37.1 (08-01-24 @ 08:00)  HR: 131 (08-01-24 @ 15:21) (86 - 131)  BP: 107/55 (08-01-24 @ 12:00) (95/51 - 112/58)  RR: 21 (08-01-24 @ 12:00) (14 - 28)  SpO2: 95% (08-01-24 @ 15:21) (95% - 100%)  Wt(kg): --  I&O's Summary    31 Jul 2024 07:01  -  01 Aug 2024 07:00  --------------------------------------------------------  IN: 1066.6 mL / OUT: 600 mL / NET: 466.6 mL    01 Aug 2024 07:01  -  01 Aug 2024 16:52  --------------------------------------------------------  IN: 383.2 mL / OUT: 0 mL / NET: 383.2 mL          Appearance: In no distress	  HEENT:    PERRL, EOMI	  Cardiovascular:  S1 S2, No JVD  Respiratory: Lungs clear to auscultation	  Gastrointestinal:  Soft, Non-tender, + BS	  Vascularature:  + edema of LE  Psychiatric: Appropriate affect   Neuro: no acute focal deficits                               7.9    8.86  )-----------( 124      ( 01 Aug 2024 00:47 )             25.2     08-01    147<H>  |  110<H>  |  28<H>  ----------------------------<  121<H>  3.7   |  24  |  0.79    Ca    7.5<L>      01 Aug 2024 00:47  Phos  2.3     08-01  Mg     1.6     08-01    TPro  4.2<L>  /  Alb  2.4<L>  /  TBili  0.7  /  DBili  x   /  AST  35  /  ALT  20  /  AlkPhos  144<H>  08-01        Labs personally reviewed      ASSESSMENT/PLAN: 	    78-year-old male multiple medical problems history of hepatocellular carcinoma status post radiation therapy, AF s/p DCCV on Eliquis who was found to be hypotensive following NST. Patient has reported general weakness, fatigue, lightheadedness since being discharged from hospital. Reports mild chest discomfort in midsternal region. Reports dyspnea with minimal exertion. Also reports significant lack of appetite and poor PO intake. No dark or bloody stool, nausea or vomiting.       Problem/Plan - 1:  ·  Problem: Hypotension  ·  Plan: Continue with pressors in MICU  - Patient presents with hypotension and also RAZIA. Has known orthostatic hypotension.   - Patient and wife report decreased PO intake likely 2/2 malignancy.  - GIB 7/9, s/p 4 units prbc, IR for mesenteric embolization, now in MICU on pressors  - c/w Midodrine 10mg PO [id number]     Problem/Plan - 2:  ·  Problem: CAD.   ·  Plan: Recent PCI to pLAD in June 2023  - ECG non-ischemic  - Plavix held given large melena; ideally should be on Plavix but high risk to resume      Problem/Plan - 3:  ·  Problem: Atrial Fibrillation  - s/p succesful DCCV 10/31  - Hold Eliquis 5mg BID given GIB  - s/p Amio  - However would advise against chemical cardioversion off AC if possible  - HR elevated. Dig increased to 62.5mcg IVP daily        ROSIO Latham-NP   Hank Hayes DO [facility]  Cardiovascular Medicine  [address] [address] [facility], [address]  Available through call or text on [employer] TE[employer]  Office: [phone]

## 2024-08-02 NOTE — PROGRESS NOTE ADULT - PROBLEM SELECTOR PLAN 3
Patient with chronic afib unable to be on AC for this and seemingly minimally responsive to amiodarone. Cardiology following, no obvious underlying cause aside from hemorrhage, which is difficult to control.  - Continue digoxin per cardiology  - F/u repeat level on 8/3  - F/u cardiology recs  - Holding eliquis Patient with chronic afib unable to be on AC for this and seemingly minimally responsive to amiodarone. Cardiology following, no obvious underlying cause aside from hemorrhage, which is difficult to control.  - Continue digoxin per cardiology  - F/u trough on 8/3  - F/u cardiology recs  - F/u EKG  - Holding eliquis

## 2024-08-02 NOTE — PROGRESS NOTE ADULT - ASSESSMENT
77 yo male with PMH of CAD s/p PCI x3 with most recent stent 6/12/24 on Plavix, chronic Afib on eliquis, orthostatic hypotension on midodrine, PAD, neuroendocrine tumor with RT currently on hold, recent admission for dx cath on 6/24/24 who presented for hypotension, admitted for GIB and hemorrhagic shock. Found to be bleeding from duodenum. Transferred to MICU, on pressors.     1. Pancreatic NET- metastatic   -- was on lanreotide then had POD in liver and started on peptide receptor radiotherapy (PRRT)- typically given every 8 weeks for 4 doses. He received one dose on 10/20/2023 and planned to get his 2nd dose at the end of December however his course was complicated by multiple hospitalizations that were noncancer related (decompensated heart failure).   -- 5/28/24 PET Dotatate (compared to 9/2023): several new somatostatin avid osseous lesions, some faintly sclerotic, suspicious for mets. Increased upper RP nodes, probably metastatic. Decreased intensely tracer avid right supradiaphragmatic and upper abdominal nodes, suspicious for metastasis. Redemonstrated intensely somatostatin avid bilobar hepatic lesions, consistent with metastases again morphologically difficult to delineate.   -- CT reviewed by [facility]: SINCE MAY 8 2024 INCREASED HEPATIC METASTASES, NEWLY SEEN PRIMARY TUMOR IN THE PANCREAS, and active bleeding within the duodenum with associated intraluminal hematoma.   -- chromogranin level is elevated at 2058, but no prior level at [facility] [place] for review   -- f/u with Dr. Sophia Prasad at [facility] [place] after discharge, no plan for chemotherapy inpatient, if clinically improves/stabilizes then can be considered for treatment outpatient  --Continue octreotide 250mcg TID    2. Duodenal bleed, Hemorrhagic shock  - Remains in MICU, s/p extubation 7/22. NG tube in place.  - CT w/ bleed in duodenum. GI called, EGD 7/9 -> S/p  IR embolization 7/9, intubated in MICU -> s/p extubation 7/15 -> intubated 7/18  - s/p multiple transfusions, fibrinogen low would recommend Cry for < 100, but coags have improved as are essentially normal now so unlikely, probably was related to liver dysfunction.   - s/p repeat EGD 7/12 showing duodenal lesions w/clots and oozing, no clear targets for intervention and no active bleeding, purastat applied to all areas of oozing.   - S/p EGD 7/18: duodenal ulcer with active oozing, ulcer with visible vessel. Bleeding treated.   - s/p multiple transfusions, per GI, high risk for rebleed, will continue PPI.   - Repeat CT AP w/ No evidence of GI bleed.  Interval endoscopic versus surgical intervention with metallic streak artifact suggestive of surgical clips in the region of the proximal one third portions of the duodenum. Evaluation of the previously seen hematoma is limited secondary to this artifact. Moderate bilateral pleural effusions and associated compressive atelectasis, right greater than left, overall slightly increased from previous CT. Anasarca.  - Per IR, In the absences of any active extravasation on CTA there's no place to target for an embolization  - GIB now stabilized, surgery continues to monitor. If significant recurrence of UGIB, surgery may consider emergent open duodenotomy   - Continue octreotide 250 mcg TID  - 7/29 sputum culture + Pseudomonas; RRT on 8/2 due to tachycardia, hypotension; was also febrile to 101. Currently on antibiotics, pending repeat urine/blood cultures. CT AP ordered due to persistent anemia.    3. C. diff   - diarrhea now resolved, s/p vanco    Will continue to follow.    Hugo Topete PA-C  Hematology/Oncology  [facility]  [phone] (office)

## 2024-08-02 NOTE — PROGRESS NOTE ADULT - SUBJECTIVE AND OBJECTIVE BOX
Patient is a 79y old  Male who presents with a chief complaint of hypotension (02 Aug 2024 10:54)    Patient seen and examined at bedside, NG tube in place, wearing mittens. RRT overnight due to hypotension, tachycardia. Febrile.    MEDICATIONS  (STANDING):  albuterol/ipratropium for Nebulization 3 milliLiter(s) Nebulizer every 6 hours  cefepime   IVPB      cefepime   IVPB 2000 milliGRAM(s) IV Intermittent every 8 hours  dextrose 50% Injectable 25 Gram(s) IV Push once  digoxin  Injectable 62.5 MICROGram(s) IV Push daily  folic acid Injectable 1 milliGRAM(s) IV Push daily  insulin lispro (ADMELOG) corrective regimen sliding scale   SubCutaneous every 6 hours  magnesium sulfate  IVPB 1 Gram(s) IV Intermittent once  metoprolol tartrate 12.5 milliGRAM(s) Oral two times a day  midodrine 10 milliGRAM(s) Oral every 8 hours  octreotide  Injectable 250 MICROGram(s) IV Push three times a day  pantoprazole  Injectable 40 milliGRAM(s) IV Push every 12 hours  sodium chloride 3%  Inhalation 4 milliLiter(s) Inhalation every 12 hours  thiamine Injectable 100 milliGRAM(s) IV Push daily    MEDICATIONS  (PRN):    Vital Signs Last 24 Hrs  T(C): 36.2 (02 Aug 2024 11:00), Max: 38.3 (01 Aug 2024 23:46)  T(F): 97.2 (02 Aug 2024 11:00), Max: 101 (01 Aug 2024 23:46)  HR: 75 (02 Aug 2024 11:00) (75 - 131)  BP: 87/56 (02 Aug 2024 11:00) (87/56 - 107/63)  BP(mean): 72 (01 Aug 2024 18:00) (72 - 80)  RR: 19 (02 Aug 2024 11:00) (18 - 22)  SpO2: 97% (02 Aug 2024 11:00) (95% - 99%)    Parameters below as of 02 Aug 2024 05:20  Patient On (Oxygen Delivery Method): room air    PE  NAD  Awake, alert  Mittens on   Anicteric, MMM  Abd soft, NT, ND  No c/c/e  No rash grossly                          7.6    11.45 )-----------( 130      ( 02 Aug 2024 08:34 )             24.9       08-02    147<H>  |  111<H>  |  28<H>  ----------------------------<  138<H>  5.4<H>   |  21<L>  |  0.87    Ca    8.1<L>      02 Aug 2024 08:34  Phos  2.9     08-02  Mg     1.8     08-02    TPro  4.6<L>  /  Alb  2.4<L>  /  TBili  0.8  /  DBili  x   /  AST  42<H>  /  ALT  22  /  AlkPhos  156<H>  08-02

## 2024-08-02 NOTE — PROGRESS NOTE ADULT - PROBLEM SELECTOR PLAN 5
Patient with CAD with multiple stents done as recently as 6/2024 but unable to be on DAPT due to persistent GIB.  - At risk for stent closure, but difficult to manage due to persistent GIB and recent hemorrhagic shock with multiple events requiring frequent transfusions in just the past several weeks.

## 2024-08-02 NOTE — PROGRESS NOTE ADULT - ATTENDING COMMENTS
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 Was intubated/extubated twice with resultant dysphagia, needs NGT for feeds and meds. Now transferred to floors.     Still having afib RVR, f/u cardiology recs.     Check CT A/P since hgb is downtrending again to r/u RP hematoma. Has not had melena. Transfuse 1U.     Septic w/fever and leukocytosis since last night, f/u urine and blood cultures, cont cefepime for now.

## 2024-08-02 NOTE — PROGRESS NOTE ADULT - SUBJECTIVE AND OBJECTIVE BOX
DATE OF SERVICE: 08-02-24 @ 10:54    Patient is a 79y old  Male who presents with a chief complaint of hypotension (02 Aug 2024 07:34)      INTERVAL HISTORY: In no acute distress. Requesting mittens to be removed. RRT overnight for tachycardia and hypotension.    REVIEW OF SYSTEMS:  CONSTITUTIONAL: No weakness  EYES/ENT: No visual changes;  No throat pain   NECK: No pain or stiffness  RESPIRATORY: No cough, wheezing; No shortness of breath  CARDIOVASCULAR: No chest pain or palpitations  GASTROINTESTINAL: No abdominal  pain. No nausea, vomiting, or hematemesis  GENITOURINARY: No dysuria, frequency or hematuria  NEUROLOGICAL: No stroke like symptoms  SKIN: No rashes    TELEMETRY Personally reviewed:  AF 90-130s  	  MEDICATIONS:  digoxin  Injectable 62.5 MICROGram(s) IV Push daily  metoprolol tartrate 12.5 milliGRAM(s) Oral two times a day  midodrine 10 milliGRAM(s) Oral every 8 hours        PHYSICAL EXAM:  T(C): 36.7 (08-02-24 @ 05:20), Max: 38.3 (08-01-24 @ 23:46)  HR: 103 (08-02-24 @ 05:20) (94 - 131)  BP: 96/61 (08-02-24 @ 05:20) (95/61 - 107/63)  RR: 20 (08-02-24 @ 05:20) (18 - 22)  SpO2: 97% (08-02-24 @ 05:20) (95% - 100%)  Wt(kg): --  I&O's Summary    01 Aug 2024 07:01  -  02 Aug 2024 07:00  --------------------------------------------------------  IN: 433.2 mL / OUT: 0 mL / NET: 433.2 mL    02 Aug 2024 07:01  -  02 Aug 2024 10:54  --------------------------------------------------------  IN: 50 mL / OUT: 0 mL / NET: 50 mL          Appearance: In no distress	  HEENT:    PERRL, EOMI	  Cardiovascular:  S1 S2, No JVD  Respiratory: Lungs clear to auscultation	  Gastrointestinal:  Soft, Non-tender, + BS	  Vascularature:  Anasarca  Psychiatric: Appropriate affect   Neuro: no acute focal deficits                               7.6    11.45 )-----------( 130      ( 02 Aug 2024 08:34 )             24.9     08-02    147<H>  |  111<H>  |  28<H>  ----------------------------<  138<H>  5.4<H>   |  21<L>  |  0.87    Ca    8.1<L>      02 Aug 2024 08:34  Phos  2.9     08-02  Mg     1.8     08-02    TPro  4.6<L>  /  Alb  2.4<L>  /  TBili  0.8  /  DBili  x   /  AST  42<H>  /  ALT  22  /  AlkPhos  156<H>  08-02        Labs personally reviewed      ASSESSMENT/PLAN: 	    78-year-old male multiple medical problems history of hepatocellular carcinoma status post radiation therapy, AF s/p DCCV on Eliquis who was found to be hypotensive following NST. Patient has reported general weakness, fatigue, lightheadedness since being discharged from hospital. Reports mild chest discomfort in midsternal region. Reports dyspnea with minimal exertion. Also reports significant lack of appetite and poor PO intake. No dark or bloody stool, nausea or vomiting.       Problem/Plan - 1:  ·  Problem: Hypotension  ·  Plan: Continue with pressors in MICU  - Patient presents with hypotension and also RAZIA. Has known orthostatic hypotension.   - Patient and wife report decreased PO intake likely 2/2 malignancy.  - GIB 7/9, s/p 4 units prbc, IR for mesenteric embolization, now in MICU on pressors  - c/w Midodrine 10mg PO [id number]     Problem/Plan - 2:  ·  Problem: CAD.   ·  Plan: Recent PCI to pLAD in June 2023  - ECG non-ischemic  - Plavix held given large melena; ideally should be on Plavix but high risk to resume      Problem/Plan - 3:  ·  Problem: Atrial Fibrillation  - s/p succesful DCCV 10/31  - Hold Eliquis 5mg BID given GIB  - s/p Amio  - However would advise against chemical cardioversion off AC if possible  - HR elevated. Dig increased to 62.5mcg IVP daily  - Metoprolol 12.5mg BID via NGT initiated--> HR and BP improving with rate control          Sulma Espinosa, ROSIO-NP   Hank Hayes DO [facility]  Cardiovascular Medicine  [address], [address]  Available through call or text on [employer]  Office: [phone]

## 2024-08-02 NOTE — PROGRESS NOTE ADULT - PROBLEM SELECTOR PLAN 2
Patient with persistent UGIB with coffee ground emesis and melanotic stools multiple times requiring IR embolization initially and then EGD with clipping for duodenal ulcer. Patient has been off of his aspirin and plavix for his very recent stent and Eliquis for his chronic afib since he has required so many repeat transfusions due to this active blood loss. The hemorrhagic shock seems to have temporarily resolved. No transfusions since 7/28. Hgb seems to be stable as well.  - Hold all AC and anti-platelet agents  - Protonix BID per GI  - f/u morning CBC  - Maintain T&S, CBCs Patient with persistent UGIB with coffee ground emesis and melanotic stools multiple times requiring IR embolization initially and then EGD with clipping for duodenal ulcer. Patient has been off of his aspirin and plavix for his very recent stent and Eliquis for his chronic afib since he has required so many repeat transfusions due to this active blood loss. The hemorrhagic shock seems to have temporarily resolved. No transfusions since 7/28. Hgb seems to be stable as well.  - Hold all AC and anti-platelet agents  - Protonix BID per GI  - f/u morning CBC  - F/u CTAP  - Maintain T&S, CBCs

## 2024-08-02 NOTE — PROGRESS NOTE ADULT - PROBLEM SELECTOR PLAN 4
Patient initially with RAZIA in the setting of hypotension possibly related to the hemorrhagic shock but now recovering.

## 2024-08-02 NOTE — PROGRESS NOTE ADULT - PROBLEM SELECTOR PLAN 7
Patient with failure to thrive iso metastatic disease as well as repeated intubation and difficulty swallowing so pending barium swallow  - Barium swallow f/u Patient with failure to thrive iso metastatic disease as well as repeated intubation and difficulty swallowing so pending barium swallow  - consider Barium swallow Monday pending stability

## 2024-08-02 NOTE — PROGRESS NOTE ADULT - NS ATTEND AMEND GEN_ALL_CORE FT
Agree with above assessment and plan.   RRT overnight however clinically appearing well today. Continue with Octreotide and will follow up with Dr Prasad outpatient at [facility] after discharge.

## 2024-08-02 NOTE — PROGRESS NOTE ADULT - PROBLEM SELECTOR PLAN 6
Patient with known pancreatic neuroendocrine tumor and was on lanreotide. Was later on radiotherapy but due to multiple hospitalizations, was unable to receive follow-up doses. Patient did have PET done in May that showed lesions suspicious for mets. CT also in May showed increased hepatic metastases.   - Continue octreotide 250mcg TID

## 2024-08-02 NOTE — PROGRESS NOTE ADULT - PROBLEM SELECTOR PLAN 1
Patient prolonged hospitalization and now with fevers and tachycardia. Poor cough especially in the setting of repeated intubations and extubations last extubated on 7/28. Not feeling SOB, but will get imaging, cultures, and start abx  - Not multi-drug resistant pseudomonas in last sputum culture, so will start with one agent for now.   - C/w cefepime 2g q8 for 7d  - F/u Bcx  - F/u UA  - F/u CXR

## 2024-08-02 NOTE — CHART NOTE - NSCHARTNOTEFT_GEN_A_CORE
Pt is a 79M hx of metastatic neuroendocrine pancreatic cancer, hx of CAD s/p PCI x3 with stents placed on 6/12/24, chronic afib, orthostatic hypotension on midodrine, and PAD here for UGIB and hemorrhagic shock requiring MICU for pressors, course complicated by C Diff, afib RVR, RAZIA iso hypotension, and x2 intubations.  - Pt actively being followed by this service.     Dysphagia course as follows:  7/17; FEES completed d/t intubation 7/9-7/15 and overall deconditioning. Recommended NPO, with non-oral nutrition/hydration/medications d/t silent aspiration during and after the swallow of conservative textures. Baseline penetration of secretions noted.  - pt intubated 7/18 for EGD d/t duodenal bleed., extubated 7/22.   7/29; Repeat FEES attempted. Pt w/ significantly congested baseline cough c/f reduced secretion management. Pt unable to tolerate fiberoptic endoscope despite max encouragement. Testing terminated. Pt verbalized suicidal ideations and medical team made aware. As per chart, pt declined Psych consult.   > Multiple attempts made at replacing NGT, pt repeatedly pulling NGT.   ***D/w medical team, pt is not a candidate for subjective diet advancement given known dysphagia prior to reintubation. GOC discussion recommended as it related to nutrition/hydration.     8/1; Phone call received by medical team requesting repeat S+S. Pt pending transfer to floor. Discussed given prior inability to tolerate repeat scope, MBS recommended. Tentatively scheduled for 8/2, however clinician plan to f/u in AM prior to MBS to ensure candidacy to come off unit.     Overnight; Blood cultures and UC sent for fever, started on Cefepime. CXR COMPLETED: Left basilar opacity may represent a combination of pleural effusion/atelectasis and/or pneumonia.  - Upon pt encounter this AM 8/2, pt mid RRT for abnormal heart rate, hypotension. Afib RVR likely being driven by multifactorial processes including sepsis (?from UTI), hypoxia (from airway secretions), and possibly anemia (recent hemoglobin drop). CT A/P ordered.    Discussed w/ MD Kemp, pt is not a candidate for MBS at this time. Pt is unstable to come off unit d/t c/f new infection (?UTI), increase in O2 from RA --> 2LNC given c/f airway secretions, and c/f hgb drop. Given ongoing dysphagia w/ suspected poor ability to clear secretions from airway, reduced ability to tolerate NGT placement, and overall deconditioning, continue to suggest GOC discussion as it relates to nutrition/hydration.     Will continue to follow as medically appropriate.     Libby Quiles CCC-SLP   Prefer teams or [phone]

## 2024-08-02 NOTE — RAPID RESPONSE TEAM SUMMARY - NSOTHERINTERVENTIONSRRT_GEN_ALL_CORE
see above
-NPO, c/w PPI  -CBC, BMP, VBG, T&S sent  -1u pRBCs administered  -f/u CT A/P pelvis  -f/u GI, recs pending

## 2024-08-02 NOTE — PROGRESS NOTE ADULT - SUBJECTIVE AND OBJECTIVE BOX
Interval Events:    REVIEW OF SYSTEMS:  CONSTITUTIONAL: No weakness, fevers or chills  EYES/ENT: No visual changes;  No vertigo or throat pain   NECK: No pain or stiffness  RESPIRATORY: No cough, wheezing, hemoptysis; No shortness of breath  CARDIOVASCULAR: No chest pain or palpitations  GASTROINTESTINAL: No abdominal or epigastric pain. No nausea, vomiting, or hematemesis; No diarrhea or constipation. No melena or hematochezia.  GENITOURINARY: No dysuria, frequency or hematuria  NEUROLOGICAL: No numbness or weakness  SKIN: No itching, burning, rashes, or lesions   All other review of systems is negative unless indicated above.    OBJECTIVE:  ICU Vital Signs Last 24 Hrs  T(C): 36.7 (02 Aug 2024 05:20), Max: 38.3 (01 Aug 2024 23:46)  T(F): 98.1 (02 Aug 2024 05:20), Max: 101 (01 Aug 2024 23:46)  HR: 103 (02 Aug 2024 05:20) (94 - 131)  BP: 96/61 (02 Aug 2024 05:20) (95/61 - 112/56)  BP(mean): 72 (01 Aug 2024 18:00) (72 - 82)  ABP: --  ABP(mean): --  RR: 20 (02 Aug 2024 05:20) (18 - 27)  SpO2: 97% (02 Aug 2024 05:20) (95% - 100%)    O2 Parameters below as of 02 Aug 2024 05:20  Patient On (Oxygen Delivery Method): room air               @ 07:01  -   @ 07:00  --------------------------------------------------------  IN: 383.2 mL / OUT: 0 mL / NET: 383.2 mL      CAPILLARY BLOOD GLUCOSE      POCT Blood Glucose.: 147 mg/dL (02 Aug 2024 05:31)      PHYSICAL EXAM:  General: WN/WD NAD  Neurology: A&Ox3, nonfocal, LACY x 4  Eyes: PERRLA/ EOMI, Gross vision intact  ENT/Neck: Neck supple, trachea midline, No JVD, Gross hearing intact  Respiratory: CTA B/L, No wheezing, rales, rhonchi  CV: RRR, +S1/S2, -S3/S4, no murmurs, rubs or gallops  Abdominal: Soft, NT, ND +BS, No HSM  MSK: 5/5 strength UE/LE bilaterally  Extremities: No edema, 2+ peripheral pulses  Skin: No Rashes, Hematoma, Ecchymosis  Incisions:   Tubes:    HOSPITAL MEDICATIONS:  MEDICATIONS  (STANDING):  cefepime   IVPB      cefepime   IVPB 2000 milliGRAM(s) IV Intermittent every 8 hours  dextrose 50% Injectable 25 Gram(s) IV Push once  digoxin  Injectable 62.5 MICROGram(s) IV Push daily  folic acid Injectable 1 milliGRAM(s) IV Push daily  insulin lispro (ADMELOG) corrective regimen sliding scale   SubCutaneous every 6 hours  midodrine 10 milliGRAM(s) Oral every 8 hours  octreotide  Injectable 250 MICROGram(s) IV Push three times a day  pantoprazole  Injectable 40 milliGRAM(s) IV Push every 12 hours  thiamine Injectable 100 milliGRAM(s) IV Push daily    MEDICATIONS  (PRN):      LABS:                        7.9    8.86  )-----------( 124      ( 01 Aug 2024 00:47 )             25.2     Hgb Trend: 7.9<--, 9.5<--, 8.2<--, 8.6<--, 7.8<--  08    147<H>  |  110<H>  |  28<H>  ----------------------------<  121<H>  3.7   |  24  |  0.79    Ca    7.5<L>      01 Aug 2024 00:47  Phos  2.3     08  Mg     1.6     08    TPro  4.2<L>  /  Alb  2.4<L>  /  TBili  0.7  /  DBili  x   /  AST  35  /  ALT  20  /  AlkPhos  144<H>      Creatinine Trend: 0.79<--, 0.91<--, 0.85<--, 0.84<--, 0.79<--, 0.72<--  PT/INR - ( 01 Aug 2024 00:47 )   PT: 12.2 sec;   INR: 1.11 ratio         PTT - ( 01 Aug 2024 00:47 )  PTT:25.1 sec  Urinalysis Basic - ( 02 Aug 2024 03:05 )    Color: Dark Yellow / Appearance: Cloudy / S.023 / pH: x  Gluc: x / Ketone: Trace mg/dL  / Bili: Negative / Urobili: 0.2 mg/dL   Blood: x / Protein: 100 mg/dL / Nitrite: Negative   Leuk Esterase: Small / RBC: 6 /HPF / WBC 85 /HPF   Sq Epi: x / Non Sq Epi: 5 /HPF / Bacteria: Many /HPF      Arterial Blood Gas:   @ 15:55  7.46/34/83/24/99.2/0.5  ABG lactate: --    Venous Blood Gas:   @ 12:15  7.28/55/21/26/20.1  VBG Lactate: 3.1      MICROBIOLOGY:          Interval Events: Patient was febrile to 101 overnight. In the morning, he had an episode of Afib with RVR and became hemodynamically unstable with BP 79/49, HR 130s. I was given a 500 LR bolus and 250ml 5% albumin. Also given 5mg IV lopressor. Blood pressure resolved to 99/55 and HR 92. NGT replaced this morning.     REVIEW OF SYSTEMS:  CONSTITUTIONAL: No weakness, fevers or chills  EYES/ENT: No visual changes;  No vertigo or throat pain   NECK: No pain or stiffness  RESPIRATORY: No cough, wheezing, hemoptysis; No shortness of breath  CARDIOVASCULAR: No chest pain or palpitations  GASTROINTESTINAL: No abdominal or epigastric pain. No nausea, vomiting, or hematemesis; No diarrhea or constipation. No melena or hematochezia.  GENITOURINARY: No dysuria, frequency or hematuria  NEUROLOGICAL: No numbness or weakness  SKIN: No itching, burning, rashes, or lesions   All other review of systems is negative unless indicated above.    OBJECTIVE:  ICU Vital Signs Last 24 Hrs  T(C): 36.7 (02 Aug 2024 05:20), Max: 38.3 (01 Aug 2024 23:46)  T(F): 98.1 (02 Aug 2024 05:20), Max: 101 (01 Aug 2024 23:46)  HR: 103 (02 Aug 2024 05:20) (94 - 131)  BP: 96/61 (02 Aug 2024 05:20) (95/61 - 112/56)  BP(mean): 72 (01 Aug 2024 18:00) (72 - 82)  ABP: --  ABP(mean): --  RR: 20 (02 Aug 2024 05:20) (18 - 27)  SpO2: 97% (02 Aug 2024 05:20) (95% - 100%)    O2 Parameters below as of 02 Aug 2024 05:20  Patient On (Oxygen Delivery Method): room air               @ 07:01  -  - @ 07:00  --------------------------------------------------------  IN: 383.2 mL / OUT: 0 mL / NET: 383.2 mL      CAPILLARY BLOOD GLUCOSE      POCT Blood Glucose.: 147 mg/dL (02 Aug 2024 05:31)      PHYSICAL EXAM:  General: Tired appearing but interactive  Respiratory: coughing, CTAB, normal respiratory effort, wheezes, crackles, or rales.  CV: Tachycardic, irregular rhythm  Abdominal: Soft, nontender, nondistended, no rebound or guarding  Neurology: AOx2-3, no focal neuro defects, LACY x 4  Extremities: Significant LE pitting edema    HOSPITAL MEDICATIONS:  MEDICATIONS  (STANDING):  cefepime   IVPB      cefepime   IVPB 2000 milliGRAM(s) IV Intermittent every 8 hours  dextrose 50% Injectable 25 Gram(s) IV Push once  digoxin  Injectable 62.5 MICROGram(s) IV Push daily  folic acid Injectable 1 milliGRAM(s) IV Push daily  insulin lispro (ADMELOG) corrective regimen sliding scale   SubCutaneous every 6 hours  midodrine 10 milliGRAM(s) Oral every 8 hours  octreotide  Injectable 250 MICROGram(s) IV Push three times a day  pantoprazole  Injectable 40 milliGRAM(s) IV Push every 12 hours  thiamine Injectable 100 milliGRAM(s) IV Push daily    MEDICATIONS  (PRN):      LABS:                        7.9    8.86  )-----------( 124      ( 01 Aug 2024 00:47 )             25.2     Hgb Trend: 7.9<--, 9.5<--, 8.2<--, 8.6<--, 7.8<--  08    147<H>  |  110<H>  |  28<H>  ----------------------------<  121<H>  3.7   |  24  |  0.79    Ca    7.5<L>      01 Aug 2024 00:47  Phos  2.3     08  Mg     1.6     08    TPro  4.2<L>  /  Alb  2.4<L>  /  TBili  0.7  /  DBili  x   /  AST  35  /  ALT  20  /  AlkPhos  144<H>  08    Creatinine Trend: 0.79<--, 0.91<--, 0.85<--, 0.84<--, 0.79<--, 0.72<--  PT/INR - ( 01 Aug 2024 00:47 )   PT: 12.2 sec;   INR: 1.11 ratio         PTT - ( 01 Aug 2024 00:47 )  PTT:25.1 sec  Urinalysis Basic - ( 02 Aug 2024 03:05 )    Color: Dark Yellow / Appearance: Cloudy / S.023 / pH: x  Gluc: x / Ketone: Trace mg/dL  / Bili: Negative / Urobili: 0.2 mg/dL   Blood: x / Protein: 100 mg/dL / Nitrite: Negative   Leuk Esterase: Small / RBC: 6 /HPF / WBC 85 /HPF   Sq Epi: x / Non Sq Epi: 5 /HPF / Bacteria: Many /HPF      Arterial Blood Gas:   @ 15:55  7.46/34/83/24/99.2/0.5  ABG lactate: --    Venous Blood Gas:   @ 12:15  7.28/55/21/26/20.1  VBG Lactate: 3.1      MICROBIOLOGY:

## 2024-08-02 NOTE — PROGRESS NOTE ADULT - PROBLEM SELECTOR PLAN 8
Patient with T2DM although also with adrenal insufficiency on hydrocortisone taper.  - Continue to taper hydrocortisone, on LDSSI given currently NPO Patient with T2DM although also with adrenal insufficiency on hydrocortisone taper.  - LDSSI given currently NPO

## 2024-08-02 NOTE — RAPID RESPONSE TEAM SUMMARY - NSSITUATIONBACKGROUNDRRT_GEN_ALL_CORE
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 Transferred to floors.    RRT called for afib with RVR with hemodynamic instability. BP was 79/49 with HR of 130s and 78/56 with  on repeat. Put pads on patient, and prepared for cardioversion. However, gave 500 LR bolus along with albumin and patient responded appropriately with repeat BP 99/55 with HR of 92 (and rates were better controlled afterwards). AO2 but lethargic. Afib RVR likely being driven by multifactorial processes including sepsis (?from UTI), hypoxia (from airway secretions), and possibly anemia (recent hemoglobin drop). Asked nursing staff who said they haven't observed a bowel movement yet but will look out for melena.    Recommendations:   - given IVF and on abx for sepsis management, tylenol for fever management  - f/u blood cx, VBG with lactate, CBC for hemoglobin trend and need for transfusion. 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 Transferred to floors.    RRT called for afib with RVR with hemodynamic instability. BP was 79/49 with HR of 130s and 78/56 with  on repeat. Put pads on patient, concern for hemodynamic instability. However, gave 500 LR bolus along with albumin and patient responded appropriately with repeat BP 99/55 with HR of 92 (and rates were better controlled afterwards). AO2 but lethargic. Afib RVR likely being driven by multifactorial processes including sepsis (?from UTI), hypoxia (from airway secretions), and possibly anemia (recent hemoglobin drop). Asked nursing staff who said they haven't observed a bowel movement yet but will look out for melena.    Recommendations:   - given IVF and on abx for sepsis management, tylenol for fever management  - f/u blood cx, VBG with lactate, CBC for hemoglobin trend and need for transfusion. Look for any s/s of bleed  - currently on digoxin and started on Lopressor by primary team for rate management   - cardiology recommendations for further afib management   - NGT for midodrine support  - pulmonary toilet including hypersal, suctioning, chest PT  - rest of care per primary, updated at bedside

## 2024-08-03 NOTE — PROGRESS NOTE ADULT - PROBLEM SELECTOR PLAN 8
Patient with T2DM although also with adrenal insufficiency on hydrocortisone taper.  - LDSSI given currently NPO

## 2024-08-03 NOTE — PROGRESS NOTE ADULT - ASSESSMENT
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 Transferred to floors. 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 Transferred to floors with persistent melena and downtrending Hgb; pending further surgical evaluation

## 2024-08-03 NOTE — PROGRESS NOTE ADULT - PROBLEM SELECTOR PLAN 2
Patient with persistent UGIB with coffee ground emesis and melanotic stools multiple times requiring IR embolization initially and then EGD with clipping for duodenal ulcer. Patient has been off of his aspirin and plavix for his very recent stent and Eliquis for his chronic afib since he has required so many repeat transfusions due to this active blood loss. The hemorrhagic shock seems to have temporarily resolved. No transfusions since 7/28. Hgb seems to be stable as well.  - Hold all AC and anti-platelet agents  - Protonix BID per GI  - f/u morning CBC  - F/u CTAP  - Maintain T&S, CBCs Patient with persistent UGIB with coffee ground emesis and melanotic stools multiple times requiring IR embolization initially and then EGD with clipping for duodenal ulcer. Patient has been off of his aspirin and plavix for his very recent stent and Eliquis for his chronic afib since he has required so many repeat transfusions due to this active blood loss. The hemorrhagic shock seems to have temporarily resolved. No transfusions since 7/28. Hgb seems to be stable as well.  - Hold all AC and anti-platelet agents  - Protonix BID per GI  - f/u morning CBC  - F/u CTAP  - Patient with continued Melena; will follow up with ACS/Trauma  - Maintain T&S, CBCs

## 2024-08-03 NOTE — PROGRESS NOTE ADULT - PROBLEM SELECTOR PLAN 7
Patient with failure to thrive iso metastatic disease as well as repeated intubation and difficulty swallowing so pending barium swallow  - consider Barium swallow Monday pending stability

## 2024-08-03 NOTE — PROGRESS NOTE ADULT - SUBJECTIVE AND OBJECTIVE BOX
Patient is a 79y old  Male who presents with a chief complaint of hypotension (03 Aug 2024 07:08)    Patient seen and examined at bedside. Staff reported dark stool overnight.    MEDICATIONS  (STANDING):  albuterol/ipratropium for Nebulization 3 milliLiter(s) Nebulizer every 6 hours  cefepime   IVPB      cefepime   IVPB 2000 milliGRAM(s) IV Intermittent every 8 hours  dextrose 50% Injectable 25 Gram(s) IV Push once  digoxin  Injectable 62.5 MICROGram(s) IV Push daily  folic acid Injectable 1 milliGRAM(s) IV Push daily  insulin lispro (ADMELOG) corrective regimen sliding scale   SubCutaneous every 6 hours  metoprolol tartrate 12.5 milliGRAM(s) Oral two times a day  midodrine 10 milliGRAM(s) Oral every 8 hours  octreotide  Injectable 250 MICROGram(s) IV Push three times a day  pantoprazole  Injectable 40 milliGRAM(s) IV Push every 12 hours  potassium chloride   Powder 40 milliEquivalent(s) Oral once  sodium chloride 3%  Inhalation 4 milliLiter(s) Inhalation every 12 hours  thiamine Injectable 100 milliGRAM(s) IV Push daily    MEDICATIONS  (PRN):      ROS  No fever, sweats, chills  Weakness  No epistaxis, HA, sore throat  No CP, SOB, cough, sputum  No n/v, abd pain  No edema  No rash  No anxiety  No back pain, joint pain  + dark stool  No dysuria, hematuria    Vital Signs Last 24 Hrs  T(C): 36.5 (03 Aug 2024 07:39), Max: 36.6 (03 Aug 2024 00:34)  T(F): 97.7 (03 Aug 2024 07:39), Max: 97.8 (03 Aug 2024 00:34)  HR: 87 (03 Aug 2024 07:39) (73 - 98)  BP: 103/67 (03 Aug 2024 07:39) (85/56 - 106/71)  BP(mean): --  RR: 18 (03 Aug 2024 07:39) (17 - 21)  SpO2: 99% (03 Aug 2024 07:39) (96% - 100%)    Parameters below as of 03 Aug 2024 07:39  Patient On (Oxygen Delivery Method): nasal cannula  O2 Flow (L/min): 2    PE  NAD  Awake, alert  NGT  Anicteric, MMM  RRR  CTAB  Abd soft, NT, ND  No c/c/e  No rash grossly                        7.7    10.74 )-----------( 134      ( 03 Aug 2024 05:12 )             24.8       08-03    147<H>  |  111<H>  |  28<H>  ----------------------------<  128<H>  3.8   |  23  |  0.90    Ca    8.0<L>      03 Aug 2024 05:12  Phos  2.9     08-02  Mg     1.8     08-02    TPro  4.7<L>  /  Alb  2.6<L>  /  TBili  0.7  /  DBili  x   /  AST  29  /  ALT  17  /  AlkPhos  117  08-03

## 2024-08-03 NOTE — PROGRESS NOTE ADULT - ASSESSMENT
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 S/p therapeutic EGD x 3 and IR GDA embolization. Surgery consulted for surgical management options.    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 S/p therapeutic EGD x 3 and IR GDA embolization. Surgery consulted for surgical management options.    -No acute surgical intervention  -Surgical management of refractory duodenal ulcer involves a large surgery likely laparotomy  -F/u CT scan A/P to eval for bleeding, recommend CTA  -Will review    Discussed with ACS Fellow    ACS

## 2024-08-03 NOTE — PROGRESS NOTE ADULT - ATTENDING COMMENTS
Acute blood loss anemia- recurrent melena, transfusing PRBCs- CT ordered r/o RP bleed- holding AC, antiplatelets- GI/surgery f/u  AF- rates now controlled 's- continue digoxin  SIRS- fever/leukocytosis- cont Cefepime, f/u cultures  Low threshold for MICU re-eval if evidence of hemodynamic instability

## 2024-08-03 NOTE — PROGRESS NOTE ADULT - NS ATTEND AMEND GEN_ALL_CORE FT
patient seen and examined this morning on rounds with PA.  Please see her note for details.  Agree with assessment and plan.  Complicated case with metastatic neuroendocrine tumor of the pancreas with ongoing GI bleed.  Patient has ongoing melena with drop in hemoglobin.  Receiving 1 unit packed red blood cell.  Plan for CT scan of the abdomen and pelvis.  Previous endoscopy revealed duodenal as the source of bleed.  Patient was previously evaluated by both GI and surgery with consideration of duodenectomy if patient was unstable.  Had been doing well up until recently.  Also on cefepime for recent sepsis with Pseudomonas in sputum culture.  Would reconsult GI and surgery after CT scan imaging and transfusion for any intervention regarding his ongoing GI bleeding

## 2024-08-03 NOTE — PROGRESS NOTE ADULT - PROBLEM SELECTOR PLAN 1
Patient prolonged hospitalization and now with fevers and tachycardia. Poor cough especially in the setting of repeated intubations and extubations last extubated on 7/28. Not feeling SOB, but will get imaging, cultures, and start abx  - Not multi-drug resistant pseudomonas in last sputum culture, so will start with one agent for now.   - C/w cefepime 2g q8 for 7d  - F/u Bcx  - F/u UA  - F/u CXR Patient prolonged hospitalization and now with fevers and tachycardia. Poor cough especially in the setting of repeated intubations and extubations last extubated on 7/28. Not feeling SOB, but will get imaging, cultures, and start abx  - Not multi-drug resistant pseudomonas in last sputum culture, so will start with one agent for now.   - C/w cefepime 2g q8 for 7d  - F/u Bcx 8/2; NGTD   - UCx 8/2 -   - Started on Cefepime 2g q8 7d for empiric coverage

## 2024-08-03 NOTE — PROVIDER CONTACT NOTE (OTHER) - ACTION/TREATMENT ORDERED:
Provider aware. No interventions at this time. Provider aware. Midodrine administered, will recheck vitals at midnight. No further interventions given at this time.

## 2024-08-03 NOTE — PROGRESS NOTE ADULT - SUBJECTIVE AND OBJECTIVE BOX
INCOMPLETE NOTE    Landon Carolina | PGY3| Pager: [place] [phone]) -- [place] ([phone])  Interval Events:    REVIEW OF SYSTEMS:  CONSTITUTIONAL: No weakness, fevers or chills  EYES/ENT: No visual changes;  No vertigo or throat pain   NECK: No pain or stiffness  RESPIRATORY: No cough, wheezing, hemoptysis; No shortness of breath  CARDIOVASCULAR: No chest pain or palpitations  GASTROINTESTINAL: No abdominal or epigastric pain. No nausea, vomiting, or hematemesis; No diarrhea or constipation. No melena or hematochezia.  GENITOURINARY: No dysuria, frequency or hematuria  NEUROLOGICAL: No numbness or weakness  SKIN: No itching, burning, rashes, or lesions   All other review of systems is negative unless indicated above.    OBJECTIVE:  ICU Vital Signs Last 24 Hrs  T(C): 36.5 (03 Aug 2024 05:08), Max: 36.6 (03 Aug 2024 00:34)  T(F): 97.7 (03 Aug 2024 05:08), Max: 97.8 (03 Aug 2024 00:34)  HR: 74 (03 Aug 2024 05:08) (73 - 98)  BP: 104/68 (03 Aug 2024 05:08) (85/56 - 106/71)  BP(mean): --  ABP: --  ABP(mean): --  RR: 17 (03 Aug 2024 05:08) (17 - 21)  SpO2: 98% (03 Aug 2024 05:08) (96% - 100%)    O2 Parameters below as of 03 Aug 2024 05:08  Patient On (Oxygen Delivery Method): nasal cannula  O2 Flow (L/min): 2            08-02 @ 07:01  -  08-03 @ 07:00  --------------------------------------------------------  IN: 275 mL / OUT: 0 mL / NET: 275 mL      CAPILLARY BLOOD GLUCOSE      POCT Blood Glucose.: 124 mg/dL (03 Aug 2024 06:26)      PHYSICAL EXAM:  General: WN/WD NAD  Neurology: A&Ox3, nonfocal, LACY x 4  Eyes: PERRLA/ EOMI, Gross vision intact  ENT/Neck: Neck supple, trachea midline, No JVD, Gross hearing intact  Respiratory: CTA B/L, No wheezing, rales, rhonchi  CV: RRR, +S1/S2, -S3/S4, no murmurs, rubs or gallops  Abdominal: Soft, NT, ND +BS, No HSM  MSK: 5/5 strength UE/LE bilaterally  Extremities: No edema, 2+ peripheral pulses  Skin: No Rashes, Hematoma, Ecchymosis  Incisions:   Tubes:    HOSPITAL MEDICATIONS:  MEDICATIONS  (STANDING):  albuterol/ipratropium for Nebulization 3 milliLiter(s) Nebulizer every 6 hours  cefepime   IVPB      cefepime   IVPB 2000 milliGRAM(s) IV Intermittent every 8 hours  dextrose 50% Injectable 25 Gram(s) IV Push once  digoxin  Injectable 62.5 MICROGram(s) IV Push daily  folic acid Injectable 1 milliGRAM(s) IV Push daily  insulin lispro (ADMELOG) corrective regimen sliding scale   SubCutaneous every 6 hours  metoprolol tartrate 12.5 milliGRAM(s) Oral two times a day  midodrine 10 milliGRAM(s) Oral every 8 hours  octreotide  Injectable 250 MICROGram(s) IV Push three times a day  pantoprazole  Injectable 40 milliGRAM(s) IV Push every 12 hours  sodium chloride 3%  Inhalation 4 milliLiter(s) Inhalation every 12 hours  thiamine Injectable 100 milliGRAM(s) IV Push daily    MEDICATIONS  (PRN):      LABS:                        7.7    10.74 )-----------( 134      ( 03 Aug 2024 05:12 )             24.8     Hgb Trend: 7.7<--, 8.9<--, 7.6<--, 7.9<--, 9.5<--  08-03    147<H>  |  111<H>  |  28<H>  ----------------------------<  128<H>  3.8   |  23  |  0.90    Ca    8.0<L>      03 Aug 2024 05:12  Phos  2.9     08-02  Mg     1.8     08-02    TPro  4.7<L>  /  Alb  2.6<L>  /  TBili  0.7  /  DBili  x   /  AST  29  /  ALT  17  /  AlkPhos  117  08-03    Creatinine Trend: 0.90<--, 0.87<--, 0.79<--, 0.91<--, 0.85<--, 0.84<--    Urinalysis Basic - ( 03 Aug 2024 05:12 )    Color: x / Appearance: x / SG: x / pH: x  Gluc: 128 mg/dL / Ketone: x  / Bili: x / Urobili: x   Blood: x / Protein: x / Nitrite: x   Leuk Esterase: x / RBC: x / WBC x   Sq Epi: x / Non Sq Epi: x / Bacteria: x        Venous Blood Gas:  08-02 @ 08:35  7.37/44/39/25/68.0  VBG Lactate: 1.9      MICROBIOLOGY:     Culture - Blood (collected 02 Aug 2024 01:24)  Source: .Blood Blood-Peripheral  Preliminary Report (03 Aug 2024 06:02):    No growth at 24 hours           Landon Carolina | PGY3| Pager: [place] [phone]) -- [place] ([phone])  Interval Events:  NAEON; patient HDS  AO4 Today;  Patient responded appropriately to pRBC, but continued oozing and melena  Discussed with GI; likely EGD will be only temporizing; will need definitive treatment  WIll Reach out to surgery.    REVIEW OF SYSTEMS:  Patient did not want to answer extensive questions, noted he is "Fine"  All other review of systems is negative unless indicated above.    OBJECTIVE:  ICU Vital Signs Last 24 Hrs  T(C): 36.5 (03 Aug 2024 05:08), Max: 36.6 (03 Aug 2024 00:34)  T(F): 97.7 (03 Aug 2024 05:08), Max: 97.8 (03 Aug 2024 00:34)  HR: 74 (03 Aug 2024 05:08) (73 - 98)  BP: 104/68 (03 Aug 2024 05:08) (85/56 - 106/71)  BP(mean): --  ABP: --  ABP(mean): --  RR: 17 (03 Aug 2024 05:08) (17 - 21)  SpO2: 98% (03 Aug 2024 05:08) (96% - 100%)    O2 Parameters below as of 03 Aug 2024 05:08  Patient On (Oxygen Delivery Method): nasal cannula  O2 Flow (L/min): 2            08-02 @ 07:01  -  08-03 @ 07:00  --------------------------------------------------------  IN: 275 mL / OUT: 0 mL / NET: 275 mL      CAPILLARY BLOOD GLUCOSE      POCT Blood Glucose.: 124 mg/dL (03 Aug 2024 06:26)      PHYSICAL EXAM:  General: WN/WD NAD  Neurology: A&Ox3, nonfocal, LACY x 4  Eyes: Did not allow pupillary exam  ENT/Neck: Neck supple, trachea midline, mildly elevated JVD  Respiratory: CTA B/L, No wheezing, rales, rhonchi  CV: RRR  Abdominal: Soft, NT, ND +BS, No HSM  MSK: 5/5 strength UE/LE bilaterally  Extremities: Diffuse edema  Skin: No oozing noted from exam      HOSPITAL MEDICATIONS:  MEDICATIONS  (STANDING):  albuterol/ipratropium for Nebulization 3 milliLiter(s) Nebulizer every 6 hours  cefepime   IVPB      cefepime   IVPB 2000 milliGRAM(s) IV Intermittent every 8 hours  dextrose 50% Injectable 25 Gram(s) IV Push once  digoxin  Injectable 62.5 MICROGram(s) IV Push daily  folic acid Injectable 1 milliGRAM(s) IV Push daily  insulin lispro (ADMELOG) corrective regimen sliding scale   SubCutaneous every 6 hours  metoprolol tartrate 12.5 milliGRAM(s) Oral two times a day  midodrine 10 milliGRAM(s) Oral every 8 hours  octreotide  Injectable 250 MICROGram(s) IV Push three times a day  pantoprazole  Injectable 40 milliGRAM(s) IV Push every 12 hours  sodium chloride 3%  Inhalation 4 milliLiter(s) Inhalation every 12 hours  thiamine Injectable 100 milliGRAM(s) IV Push daily    MEDICATIONS  (PRN):      LABS:                        7.7    10.74 )-----------( 134      ( 03 Aug 2024 05:12 )             24.8     Hgb Trend: 7.7<--, 8.9<--, 7.6<--, 7.9<--, 9.5<--  08-03    147<H>  |  111<H>  |  28<H>  ----------------------------<  128<H>  3.8   |  23  |  0.90    Ca    8.0<L>      03 Aug 2024 05:12  Phos  2.9     08-02  Mg     1.8     08-02    TPro  4.7<L>  /  Alb  2.6<L>  /  TBili  0.7  /  DBili  x   /  AST  29  /  ALT  17  /  AlkPhos  117  08-03    Creatinine Trend: 0.90<--, 0.87<--, 0.79<--, 0.91<--, 0.85<--, 0.84<--    Urinalysis Basic - ( 03 Aug 2024 05:12 )    Color: x / Appearance: x / SG: x / pH: x  Gluc: 128 mg/dL / Ketone: x  / Bili: x / Urobili: x   Blood: x / Protein: x / Nitrite: x   Leuk Esterase: x / RBC: x / WBC x   Sq Epi: x / Non Sq Epi: x / Bacteria: x        Venous Blood Gas:  08-02 @ 08:35  7.37/44/39/25/68.0  VBG Lactate: 1.9      MICROBIOLOGY:     Culture - Blood (collected 02 Aug 2024 01:24)  Source: .Blood Blood-Peripheral  Preliminary Report (03 Aug 2024 06:02):    No growth at 24 hours

## 2024-08-03 NOTE — PROGRESS NOTE ADULT - SUBJECTIVE AND OBJECTIVE BOX
Surgery Progress Note    Subjective:     Patient seen and examined at bedside. Extensive discussion had with patient, RN at bedside. Physical chart and [facility] chart reviewed. Discussed with wife Yamilet over the phone regarding plan of care and goals and wishes. 2U PRBC in last 24 hours, one episode of melena per nurse. Patient hemodynamically stable. A/C held. Albumin noted 2.6.  OBJECTIVE:     T(C): 36.5 (08-03-24 @ 13:30), Max: 36.6 (08-03-24 @ 00:34)  HR: 80 (08-03-24 @ 13:30) (73 - 92)  BP: 91/58 (08-03-24 @ 13:30) (89/57 - 110/75)  RR: 19 (08-03-24 @ 13:30) (17 - 19)  SpO2: 98% (08-03-24 @ 13:30) (96% - 100%)  Wt(kg): --    I&O's Detail    02 Aug 2024 07:01  -  03 Aug 2024 07:00  --------------------------------------------------------  IN:    IV PiggyBack: 100 mL    Platelets - Single Donor: 225 mL  Total IN: 325 mL    OUT:  Total OUT: 0 mL    Total NET: 325 mL          PHYSICAL EXAM:    GENERAL: Lying in bed. Lethargic  HEENT: Jay tube in place  Lungs: Junky lung sounds  Abdomen- Soft, mildly tender. Infraumbilical laparotomy incision c/d/i  Extremities- Edematous upper and lower extremities, anasarca    MEDICATIONS  (STANDING):  albuterol/ipratropium for Nebulization 3 milliLiter(s) Nebulizer every 6 hours  cefepime   IVPB      cefepime   IVPB 2000 milliGRAM(s) IV Intermittent every 8 hours  dextrose 50% Injectable 25 Gram(s) IV Push once  folic acid Injectable 1 milliGRAM(s) IV Push daily  insulin lispro (ADMELOG) corrective regimen sliding scale   SubCutaneous every 6 hours  metoprolol tartrate 12.5 milliGRAM(s) Oral two times a day  midodrine 10 milliGRAM(s) Oral every 8 hours  octreotide  Injectable 250 MICROGram(s) IV Push three times a day  pantoprazole  Injectable 40 milliGRAM(s) IV Push every 12 hours  sodium chloride 3%  Inhalation 4 milliLiter(s) Inhalation every 12 hours  thiamine Injectable 100 milliGRAM(s) IV Push daily    MEDICATIONS  (PRN):      LABS:                          8.4    10.86 )-----------( 121      ( 03 Aug 2024 17:11 )             25.2     08-03    147<H>  |  111<H>  |  28<H>  ----------------------------<  128<H>  3.8   |  23  |  0.90    Ca    8.0<L>      03 Aug 2024 05:12  Phos  2.9     08-02  Mg     1.8     08-02    TPro  4.7<L>  /  Alb  2.6<L>  /  TBili  0.7  /  DBili  x   /  AST  29  /  ALT  17  /  AlkPhos  117  08-03    PT/INR - ( 03 Aug 2024 17:11 )   PT: 12.6 sec;   INR: 1.15 ratio         PTT - ( 03 Aug 2024 17:11 )  PTT:26.0 sec    Imaging:    ACC: [id number] EXAM:  CT ABDOMEN AND PELVIS IC   ORDERED BY:  CHIDI CURRY     PROCEDURE DATE:  07/20/2024          INTERPRETATION:  CLINICAL INFORMATION: Evaluate for GI bleed, recent drop   in hemoglobin    COMPARISON: CT abdomen and pelvis 7/9/2024.    CONTRAST/COMPLICATIONS:  IV Contrast: Omnipaque 350  90 cc administered   10 cc discarded  Oral Contrast: NONE  Complications: None reported at time of study completion    PROCEDURE:  CT of the Abdomen and Pelvis was performed.  Precontrast, Arterial and Delayed phases were performed.  Sagittal and coronal reformats were performed.    FINDINGS:  Evaluation of the abdomen and pelvis is partially limited secondary to   metallic streak artifact.    LOWER CHEST: Moderate bilateral pleural effusions with associated   compressive atelectasis, left greater than right. Coronary artery and   aortic valvular calcifications. Partially imaged central venous catheter   tip in SVC.    LIVER: Nodular contour. Numerous hypodense foci compatible with known   metastatic disease.  BILE DUCTS: Normal caliber.  GALLBLADDER: Within normal limits.  SPLEEN: Within normal limits.  PANCREAS: Within normal limits.  ADRENALS: Within normal limits.  KIDNEYS/URETERS: Atrophic kidneys bilaterally. No hydronephrosis.   Bilateral renal cysts and additional hypodense foci too small to   characterize. 6 mm nonobstructing stone in the left renal pelvis.    BLADDER: 2 large stones in the dome of the bladder are redemonstrated   measuring 3.4 and 2.4 cm respectively, stable. Ivory catheter.  REPRODUCTIVE ORGANS: Prostate within normal limits.  No GI bleed.  BOWEL: Metallic streak artifact in the region of the proximal and third   portions of the duodenum reflective of recent operative versus endoscopic   intervention. Evaluation of the previously seen duodenal hematoma is   limited secondary to this artifact. No new area of contrast extravasation   to suggest an active GI bleed is appreciated.  Enteric tube tip in stomach. No bowel obstruction. Appendix is not   visualized. No evidence of inflammation in the pericecal region.  PERITONEUM/RETROPERITONEUM: Moderate ascites.  VESSELS: Atherosclerotic changes.  LYMPH NODES: No lymphadenopathy.  ABDOMINAL WALL: Diffuse subcutaneous edema.  BONES: Partially imaged thoracolumbar spinal fusion hardware. Right total   hip arthroplasty. Degenerative changes.    IMPRESSION:  *  No evidence of GI bleed.  *  Interval endoscopic versus surgical intervention with metallic streak   artifact suggestive of surgical clips in the region of the proximal one   third portions of the duodenum. Evaluation of the previously seen   hematoma is limited secondary to this artifact.  *  Moderate bilateral pleural effusions and associated compressive   atelectasis, right greater than left, overall slightly increased from   previous CT.  *  Anasarca.      --- End of Report ---          YOBANI REYNOSO DO; Resident Radiologist  This document has been electronically signed.  OSCAR MCKEON MD; Attending Radiologist  This document has been electronically signed. Jul 21 2024 11:50AM

## 2024-08-03 NOTE — PROGRESS NOTE ADULT - ASSESSMENT
79 yo male with PMH of CAD s/p PCI x3 with most recent stent 6/12/24 on Plavix, chronic Afib on eliquis, orthostatic hypotension on midodrine, PAD, neuroendocrine tumor with RT currently on hold, recent admission for dx cath on 6/24/24 who presented for hypotension, admitted for GIB and hemorrhagic shock. Found to be bleeding from duodenum. Transferred to MICU, on pressors.     1. Pancreatic NET- metastatic   -- was on lanreotide then had POD in liver and started on peptide receptor radiotherapy (PRRT)- typically given every 8 weeks for 4 doses. He received one dose on 10/20/2023 and planned to get his 2nd dose at the end of December however his course was complicated by multiple hospitalizations that were noncancer related (decompensated heart failure).   -- 5/28/24 PET Dotatate (compared to 9/2023): several new somatostatin avid osseous lesions, some faintly sclerotic, suspicious for mets. Increased upper RP nodes, probably metastatic. Decreased intensely tracer avid right supradiaphragmatic and upper abdominal nodes, suspicious for metastasis. Redemonstrated intensely somatostatin avid bilobar hepatic lesions, consistent with metastases again morphologically difficult to delineate.   -- CT reviewed by [facility]: SINCE MAY 8 2024 INCREASED HEPATIC METASTASES, NEWLY SEEN PRIMARY TUMOR IN THE PANCREAS, and active bleeding within the duodenum with associated intraluminal hematoma.   -- chromogranin level is elevated at 2058, but no prior level at [facility] [place], [place] for review   -- f/u with Dr. Sophia Prasad at [facility] [place] after discharge, no plan for chemotherapy inpatient, if clinically improves/stabilizes then can be considered for treatment outpatient  --Continue octreotide 250mcg TID    2. Duodenal bleed, Hemorrhagic shock  - Now transferred to medicine from MICU, s/p extubation 7/22. NG tube in place.  - CT w/ bleed in duodenum. GI called, EGD 7/9 -> S/p  IR embolization 7/9, intubated in MICU -> s/p extubation 7/15 -> intubated 7/18  - s/p multiple transfusions, fibrinogen low would recommend Cryo for < 100, but coags have improved as are essentially normal now so unlikely, probably was related to liver dysfunction.   - s/p repeat EGD 7/12 showing duodenal lesions w/clots and oozing, no clear targets for intervention and no active bleeding, purastat applied to all areas of oozing.   - S/p EGD 7/18: duodenal ulcer with active oozing, ulcer with visible vessel. Bleeding treated.   - s/p multiple transfusions, per GI, high risk for rebleed, will continue PPI.   - Repeat CT AP w/ No evidence of GI bleed.  Interval endoscopic versus surgical intervention with metallic streak artifact suggestive of surgical clips in the region of the proximal one third portions of the duodenum. Evaluation of the previously seen hematoma is limited secondary to this artifact. Moderate bilateral pleural effusions and associated compressive atelectasis, right greater than left, overall slightly increased from previous CT. Anasarca.  - Per IR, In the absences of any active extravasation on CTA there's no place to target for an embolization  - GIB stabilized, surgery continues to monitor. If significant recurrence of UGIB, surgery may consider emergent open duodenotomy   - 7/29 sputum culture + Pseudomonas; RRT on 8/2 due to tachycardia, hypotension; febrile to 101. Currently on cefepime x 7 days  - Black stool reported overnight 8/3, ordered for one unit pRBC this morning  - CT AP pending; ordered due to persistent anemia.  - Continue octreotide 250 mcg TID    3. C. diff   - diarrhea resolved, off vanco    Will continue to follow.    Hugo Topete PA-C  Hematology/Oncology  [facility]  [phone] (office)

## 2024-08-03 NOTE — PROGRESS NOTE ADULT - PROBLEM SELECTOR PLAN 10
DVT: unable to be on anything but SCDs  Diet: Unable to tolerate diet and repeatedly removing NGT but does seem to intermittently have capacity so not currently restraining patient either  GOC: Full code DVT: unable to be on anything but SCDs  Diet: NPO with NG Tube for now given aspiration risk  GOC: Full code

## 2024-08-03 NOTE — PROGRESS NOTE ADULT - PROBLEM SELECTOR PLAN 3
Patient with chronic afib unable to be on AC for this and seemingly minimally responsive to amiodarone. Cardiology following, no obvious underlying cause aside from hemorrhage, which is difficult to control.  - Continue digoxin per cardiology  - F/u trough on 8/3  - F/u cardiology recs  - F/u EKG  - Holding eliquis Patient with chronic afib unable to be on AC for this and seemingly minimally responsive to amiodarone. Cardiology following, no obvious underlying cause aside from hemorrhage, which is difficult to control.  - Continue digoxin per cardiology  - Digoxin 125 EOD  - F/U Trough  - Holding eliquis

## 2024-08-04 NOTE — PROGRESS NOTE ADULT - PROBLEM SELECTOR PLAN 2
Patient with persistent UGIB with coffee ground emesis and melanotic stools multiple times requiring IR embolization initially and then EGD with clipping for duodenal ulcer. Patient has been off of his aspirin and plavix for his very recent stent and Eliquis for his chronic afib since he has required so many repeat transfusions due to this active blood loss. The hemorrhagic shock seems to have temporarily resolved. No transfusions since 7/28. Hgb seems to be stable as well.  - Hold all AC and anti-platelet agents  - Protonix BID per GI  - f/u morning CBC  - F/u CTAP  - Patient with continued Melena; will follow up with ACS/Trauma  - Maintain T&S, CBCs Patient with persistent UGIB with coffee ground emesis and melanotic stools multiple times requiring IR embolization initially and then EGD with clipping for duodenal ulcer. Patient has been off of his aspirin and plavix for his very recent stent and Eliquis for his chronic afib since he has required so many repeat transfusions due to this active blood loss. The hemorrhagic shock seems to have temporarily resolved. No transfusions since 7/28. Hgb seems to be stable as well.  - Hold all AC and anti-platelet agents  - Protonix BID per GI  - f/u CBC Q12H  - F/u CTAP IV contrast today 8/4  - Patient with continued Melena; will follow up with ACS/Trauma  - Maintain T&S, CBCs Patient with persistent UGIB with coffee ground emesis and melanotic stools multiple times requiring IR embolization initially and then EGD with clipping for duodenal ulcer. Patient has been off of his aspirin and plavix for his very recent stent and Eliquis for his chronic afib since he has required so many repeat transfusions due to this active blood loss. The hemorrhagic shock seems to have temporarily resolved. No transfusions since 7/28. Hgb seems to be stable as well.  - Hold all AC and anti-platelet agents  - Protonix BID per GI  - f/u CBC Q12H  - F/u CTAP IV contrast today 8/4  - Patient with continued Melena; will follow up with ACS/Trauma  - can consider iron chelation therapy given many transfusions  - Maintain T&S, CBCs

## 2024-08-04 NOTE — PROGRESS NOTE ADULT - ASSESSMENT
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 Transferred to floors with persistent melena and downtrending Hgb; pending further surgical evaluation

## 2024-08-04 NOTE — PROGRESS NOTE ADULT - PROBLEM SELECTOR PLAN 1
Patient prolonged hospitalization and now with fevers and tachycardia. Poor cough especially in the setting of repeated intubations and extubations last extubated on 7/28. Not feeling SOB, but will get imaging, cultures, and start abx  - Not multi-drug resistant pseudomonas in last sputum culture, so will start with one agent for now.   - C/w cefepime 2g q8 for 7d  - F/u Bcx 8/2; NGTD   - UCx 8/2 -   - Started on Cefepime 2g q8 7d for empiric coverage Patient prolonged hospitalization and now with fevers and tachycardia. Poor cough especially in the setting of repeated intubations and extubations last extubated on 7/28. Not feeling SOB, but will get imaging, cultures, and start abx  - Not multi-drug resistant pseudomonas in last sputum culture, so will start with one agent for now.   - C/w cefepime 2g q8 for 7d  - F/u Bcx 8/2; NGTD   - UCx 8/2 - gram negative rods   - Started on Cefepime 2g q8 7d for empiric coverage Patient prolonged hospitalization and now with fevers and tachycardia. Poor cough especially in the setting of repeated intubations and extubations last extubated on 7/28. Not feeling SOB, but will get imaging, cultures, and start abx  - Not multi-drug resistant pseudomonas in last sputum culture, so will start with one agent for now.   - C/w cefepime 2g q8 for 7d  - F/u Bcx 8/2; NGTD   - UCx 8/2 - gram negative rods   - C/w Cefepime 2g q8 7d for empiric coverage

## 2024-08-04 NOTE — PROGRESS NOTE ADULT - ASSESSMENT
79 yo male with PMH of CAD s/p PCI x3 with most recent stent 6/12/24 on Plavix, chronic Afib on eliquis, orthostatic hypotension on midodrine, PAD, neuroendocrine tumor with RT currently on hold, recent admission for dx cath on 6/24/24 who presented for hypotension, admitted for GIB and hemorrhagic shock. Found to be bleeding from duodenum. Transferred to MICU, on pressors.     1. Pancreatic NET- metastatic   -- was on lanreotide then had POD in liver and started on peptide receptor radiotherapy (PRRT)- typically given every 8 weeks for 4 doses. He received one dose on 10/20/2023 and planned to get his 2nd dose at the end of December however his course was complicated by multiple hospitalizations that were noncancer related (decompensated heart failure).   -- 5/28/24 PET Dotatate (compared to 9/2023): several new somatostatin avid osseous lesions, some faintly sclerotic, suspicious for mets. Increased upper RP nodes, probably metastatic. Decreased intensely tracer avid right supradiaphragmatic and upper abdominal nodes, suspicious for metastasis. Redemonstrated intensely somatostatin avid bilobar hepatic lesions, consistent with metastases again morphologically difficult to delineate.   -- CT reviewed by [facility]: SINCE MAY 8 2024 INCREASED HEPATIC METASTASES, NEWLY SEEN PRIMARY TUMOR IN THE PANCREAS, and active bleeding within the duodenum with associated intraluminal hematoma.   -- chromogranin level is elevated at 2058, but no prior level at [facility] for review   -- f/u with Dr. Sophia Prasad at [facility] after discharge, no plan for chemotherapy inpatient, if clinically improves/stabilizes then can be considered for treatment outpatient  --Continue octreotide 250mcg TID    2. Duodenal bleed, Hemorrhagic shock  - Now transferred to medicine from MICU, s/p extubation 7/22. NG tube in place.  - CT w/ bleed in duodenum. GI called, EGD 7/9 -> S/p  IR embolization 7/9, intubated in MICU -> s/p extubation 7/15 -> intubated 7/18  - s/p multiple transfusions, fibrinogen low would recommend Cryo for < 100, but coags have improved as are essentially normal now so unlikely, probably was related to liver dysfunction.   - s/p repeat EGD 7/12 showing duodenal lesions w/clots and oozing, no clear targets for intervention and no active bleeding, purastat applied to all areas of oozing.   - S/p EGD 7/18: duodenal ulcer with active oozing, ulcer with visible vessel. Bleeding treated.   - s/p multiple transfusions, per GI, high risk for rebleed, will continue PPI.   - Repeat CT AP w/ No evidence of GI bleed.  Interval endoscopic versus surgical intervention with metallic streak artifact suggestive of surgical clips in the region of the proximal one third portions of the duodenum. Evaluation of the previously seen hematoma is limited secondary to this artifact. Moderate bilateral pleural effusions and associated compressive atelectasis, right greater than left, overall slightly increased from previous CT. Anasarca.  - Per IR, In the absences of any active extravasation on CTA there's no place to target for an embolization  - 7/29 sputum culture + Pseudomonas; RRT on 8/2 due to tachycardia, hypotension; febrile to 101. Currently on cefepime x 7 days  - Continue octreotide 250 mcg TID  - Surgery followed up, management of refractory duodenal ulcer involves a large surgery likely laparotomy. Recommend CTA    3. C. diff   - diarrhea resolved, off vanco    Will continue to follow.    Hugo Topete PA-C  Hematology/Oncology  [facility]  [phone] (office)

## 2024-08-04 NOTE — PROGRESS NOTE ADULT - SUBJECTIVE AND OBJECTIVE BOX
Patient is a 79y old  Male who presents with a chief complaint of hypotension (04 Aug 2024 05:57)    Patient seen and examined at bedside, no acute changes. Feeling okay. NGT in place.  MEDICATIONS  (STANDING):  albuterol/ipratropium for Nebulization 3 milliLiter(s) Nebulizer every 6 hours  cefepime   IVPB      cefepime   IVPB 2000 milliGRAM(s) IV Intermittent every 8 hours  dextrose 50% Injectable 25 Gram(s) IV Push once  digoxin     Tablet 125 MICROGram(s) Oral every other day  folic acid Injectable 1 milliGRAM(s) IV Push daily  insulin lispro (ADMELOG) corrective regimen sliding scale   SubCutaneous every 6 hours  metoprolol tartrate 12.5 milliGRAM(s) Oral two times a day  midodrine 10 milliGRAM(s) Oral every 8 hours  octreotide  Injectable 250 MICROGram(s) IV Push three times a day  pantoprazole  Injectable 40 milliGRAM(s) IV Push every 12 hours  potassium chloride   Powder 20 milliEquivalent(s) Oral once  sodium chloride 3%  Inhalation 4 milliLiter(s) Inhalation every 12 hours  thiamine Injectable 100 milliGRAM(s) IV Push daily    MEDICATIONS  (PRN):    Vital Signs Last 24 Hrs  T(C): 36.4 (04 Aug 2024 11:32), Max: 36.6 (03 Aug 2024 20:23)  T(F): 97.6 (04 Aug 2024 11:32), Max: 97.9 (03 Aug 2024 20:23)  HR: 94 (04 Aug 2024 11:32) (64 - 94)  BP: 98/63 (04 Aug 2024 11:32) (91/58 - 103/62)  BP(mean): --  RR: 18 (04 Aug 2024 11:32) (18 - 19)  SpO2: 100% (04 Aug 2024 11:32) (97% - 100%)    Parameters below as of 04 Aug 2024 11:32  Patient On (Oxygen Delivery Method): room air    PE  NAD  Awake, alert  Anicteric, MMM  NGT  Abd soft, NT, ND  No c/c/e  No rash grossly                          9.4    9.96  )-----------( 121      ( 04 Aug 2024 10:23 )             29.5       08-04    150<H>  |  113<H>  |  26<H>  ----------------------------<  109<H>  3.8   |  24  |  0.83    Ca    7.9<L>      04 Aug 2024 10:23  Phos  2.3     08-04  Mg     1.6     08-04    TPro  4.6<L>  /  Alb  2.4<L>  /  TBili  0.8  /  DBili  x   /  AST  31  /  ALT  18  /  AlkPhos  113  08-04

## 2024-08-04 NOTE — PROGRESS NOTE ADULT - ATTENDING COMMENTS
Acute blood loss anemia- recurrent melena, transfused PRBCs 8/3- CT ordered r/o RP bleed- holding AC, antiplatelets- GI/surgery following  AF- rates now controlled 's- continue digoxin  Hypernatremia- start free water via NGT  SIRS- fever/leukocytosis- cont Cefepime, f/u cultures  Low threshold for MICU re-eval if evidence of hemodynamic instability

## 2024-08-04 NOTE — PROGRESS NOTE ADULT - PROBLEM SELECTOR PLAN 10
DVT: unable to be on anything but SCDs  Diet: NPO with NG Tube for now given aspiration risk  GOC: Full code

## 2024-08-04 NOTE — PROGRESS NOTE ADULT - NS ATTEND AMEND GEN_ALL_CORE FT
patient seen and examined this morning on rounds with PA.  Please see her note for details.  I agree with the assessment and plan.   patient is feeling well this morning.  He received packed red blood cells with appropriate rise in hemoglobin.  No reported dark stool/melena today.  Patient was seen by surgery for follow-up yesterday.  Appreciate their input.  No plan for surgical intervention.  Recommend CT angiogram of the abdomen.  Will follow-up on this result

## 2024-08-04 NOTE — PROGRESS NOTE ADULT - SUBJECTIVE AND OBJECTIVE BOX
Interval Events:    REVIEW OF SYSTEMS:  CONSTITUTIONAL: No weakness, fevers or chills  EYES/ENT: No visual changes;  No vertigo or throat pain   NECK: No pain or stiffness  RESPIRATORY: No cough, wheezing, hemoptysis; No shortness of breath  CARDIOVASCULAR: No chest pain or palpitations  GASTROINTESTINAL: No abdominal or epigastric pain. No nausea, vomiting, or hematemesis; No diarrhea or constipation. No melena or hematochezia.  GENITOURINARY: No dysuria, frequency or hematuria  NEUROLOGICAL: No numbness or weakness  SKIN: No itching, burning, rashes, or lesions   All other review of systems is negative unless indicated above.    OBJECTIVE:  ICU Vital Signs Last 24 Hrs  T(C): 36.3 (04 Aug 2024 04:23), Max: 36.6 (03 Aug 2024 20:23)  T(F): 97.4 (04 Aug 2024 04:23), Max: 97.9 (03 Aug 2024 20:23)  HR: 64 (04 Aug 2024 04:23) (64 - 87)  BP: 103/62 (04 Aug 2024 04:23) (89/57 - 110/75)  BP(mean): --  ABP: --  ABP(mean): --  RR: 18 (04 Aug 2024 04:23) (18 - 19)  SpO2: 98% (04 Aug 2024 04:23) (96% - 99%)    O2 Parameters below as of 04 Aug 2024 04:23  Patient On (Oxygen Delivery Method): room air              08-02 @ 07:01  -  08-03 @ 07:00  --------------------------------------------------------  IN: 325 mL / OUT: 0 mL / NET: 325 mL      CAPILLARY BLOOD GLUCOSE      POCT Blood Glucose.: 122 mg/dL (04 Aug 2024 00:21)      PHYSICAL EXAM:  General: WN/WD NAD  Neurology: A&Ox3, nonfocal, LACY x 4  Eyes: PERRLA/ EOMI, Gross vision intact  ENT/Neck: Neck supple, trachea midline, No JVD, Gross hearing intact  Respiratory: CTA B/L, No wheezing, rales, rhonchi  CV: RRR, +S1/S2, -S3/S4, no murmurs, rubs or gallops  Abdominal: Soft, NT, ND +BS, No HSM  MSK: 5/5 strength UE/LE bilaterally  Extremities: No edema, 2+ peripheral pulses  Skin: No Rashes, Hematoma, Ecchymosis  Incisions:   Tubes:    HOSPITAL MEDICATIONS:  MEDICATIONS  (STANDING):  albuterol/ipratropium for Nebulization 3 milliLiter(s) Nebulizer every 6 hours  cefepime   IVPB 2000 milliGRAM(s) IV Intermittent every 8 hours  cefepime   IVPB      dextrose 50% Injectable 25 Gram(s) IV Push once  digoxin     Tablet 125 MICROGram(s) Oral every other day  folic acid Injectable 1 milliGRAM(s) IV Push daily  insulin lispro (ADMELOG) corrective regimen sliding scale   SubCutaneous every 6 hours  metoprolol tartrate 12.5 milliGRAM(s) Oral two times a day  midodrine 10 milliGRAM(s) Oral every 8 hours  octreotide  Injectable 250 MICROGram(s) IV Push three times a day  pantoprazole  Injectable 40 milliGRAM(s) IV Push every 12 hours  sodium chloride 3%  Inhalation 4 milliLiter(s) Inhalation every 12 hours  thiamine Injectable 100 milliGRAM(s) IV Push daily    MEDICATIONS  (PRN):      LABS:                        8.4    10.86 )-----------( 121      ( 03 Aug 2024 17:11 )             25.2     Hgb Trend: 8.4<--, 7.7<--, 8.9<--, 7.6<--, 7.9<--  08-03    147<H>  |  111<H>  |  28<H>  ----------------------------<  128<H>  3.8   |  23  |  0.90    Ca    8.0<L>      03 Aug 2024 05:12  Phos  2.9     08-02  Mg     1.8     08-02    TPro  4.7<L>  /  Alb  2.6<L>  /  TBili  0.7  /  DBili  x   /  AST  29  /  ALT  17  /  AlkPhos  117  08-03    Creatinine Trend: 0.90<--, 0.87<--, 0.79<--, 0.91<--, 0.85<--, 0.84<--  PT/INR - ( 03 Aug 2024 17:11 )   PT: 12.6 sec;   INR: 1.15 ratio         PTT - ( 03 Aug 2024 17:11 )  PTT:26.0 sec  Urinalysis Basic - ( 03 Aug 2024 05:12 )    Color: x / Appearance: x / SG: x / pH: x  Gluc: 128 mg/dL / Ketone: x  / Bili: x / Urobili: x   Blood: x / Protein: x / Nitrite: x   Leuk Esterase: x / RBC: x / WBC x   Sq Epi: x / Non Sq Epi: x / Bacteria: x        Venous Blood Gas:  08-02 @ 08:35  7.37/44/39/25/68.0  VBG Lactate: 1.9      MICROBIOLOGY:     Culture - Blood (collected 02 Aug 2024 13:20)  Source: .Blood Blood-Peripheral  Preliminary Report (03 Aug 2024 18:01):    No growth at 24 hours    Culture - Urine (collected 02 Aug 2024 05:21)  Source: Clean Catch Clean Catch (Midstream)  Preliminary Report (03 Aug 2024 23:57):    >100,000 CFU/ml Gram Negative Rods    Culture - Blood (collected 02 Aug 2024 01:24)  Source: .Blood Blood-Peripheral  Preliminary Report (03 Aug 2024 06:02):    No growth at 24 hours           Interval Events: No acute events overnight. Denies chest pain, SOB, paliptations.     REVIEW OF SYSTEMS:  All other review of systems is negative unless indicated above.    OBJECTIVE:  ICU Vital Signs Last 24 Hrs  T(C): 36.3 (04 Aug 2024 04:23), Max: 36.6 (03 Aug 2024 20:23)  T(F): 97.4 (04 Aug 2024 04:23), Max: 97.9 (03 Aug 2024 20:23)  HR: 64 (04 Aug 2024 04:23) (64 - 87)  BP: 103/62 (04 Aug 2024 04:23) (89/57 - 110/75)  BP(mean): --  ABP: --  ABP(mean): --  RR: 18 (04 Aug 2024 04:23) (18 - 19)  SpO2: 98% (04 Aug 2024 04:23) (96% - 99%)    O2 Parameters below as of 04 Aug 2024 04:23  Patient On (Oxygen Delivery Method): room air              08-02 @ 07:01  -  08-03 @ 07:00  --------------------------------------------------------  IN: 325 mL / OUT: 0 mL / NET: 325 mL      CAPILLARY BLOOD GLUCOSE      POCT Blood Glucose.: 122 mg/dL (04 Aug 2024 00:21)      PHYSICAL EXAM:  General: Tired appearing but interactive  Respiratory: coughing, CTAB, normal respiratory effort, wheezes, crackles, or rales.  CV: Tachycardic, irregular rhythm  Abdominal: Soft, nontender, nondistended, no rebound or guarding  Neurology: AOx2-3, no focal neuro defects, LACY x 4  Extremities: Significant LE pitting edema    HOSPITAL MEDICATIONS:  MEDICATIONS  (STANDING):  albuterol/ipratropium for Nebulization 3 milliLiter(s) Nebulizer every 6 hours  cefepime   IVPB 2000 milliGRAM(s) IV Intermittent every 8 hours  cefepime   IVPB      dextrose 50% Injectable 25 Gram(s) IV Push once  digoxin     Tablet 125 MICROGram(s) Oral every other day  folic acid Injectable 1 milliGRAM(s) IV Push daily  insulin lispro (ADMELOG) corrective regimen sliding scale   SubCutaneous every 6 hours  metoprolol tartrate 12.5 milliGRAM(s) Oral two times a day  midodrine 10 milliGRAM(s) Oral every 8 hours  octreotide  Injectable 250 MICROGram(s) IV Push three times a day  pantoprazole  Injectable 40 milliGRAM(s) IV Push every 12 hours  sodium chloride 3%  Inhalation 4 milliLiter(s) Inhalation every 12 hours  thiamine Injectable 100 milliGRAM(s) IV Push daily    MEDICATIONS  (PRN):      LABS:                        8.4    10.86 )-----------( 121      ( 03 Aug 2024 17:11 )             25.2     Hgb Trend: 8.4<--, 7.7<--, 8.9<--, 7.6<--, 7.9<--  08-03    147<H>  |  111<H>  |  28<H>  ----------------------------<  128<H>  3.8   |  23  |  0.90    Ca    8.0<L>      03 Aug 2024 05:12  Phos  2.9     08-02  Mg     1.8     08-02    TPro  4.7<L>  /  Alb  2.6<L>  /  TBili  0.7  /  DBili  x   /  AST  29  /  ALT  17  /  AlkPhos  117  08-03    Creatinine Trend: 0.90<--, 0.87<--, 0.79<--, 0.91<--, 0.85<--, 0.84<--  PT/INR - ( 03 Aug 2024 17:11 )   PT: 12.6 sec;   INR: 1.15 ratio         PTT - ( 03 Aug 2024 17:11 )  PTT:26.0 sec  Urinalysis Basic - ( 03 Aug 2024 05:12 )    Color: x / Appearance: x / SG: x / pH: x  Gluc: 128 mg/dL / Ketone: x  / Bili: x / Urobili: x   Blood: x / Protein: x / Nitrite: x   Leuk Esterase: x / RBC: x / WBC x   Sq Epi: x / Non Sq Epi: x / Bacteria: x        Venous Blood Gas:  08-02 @ 08:35  7.37/44/39/25/68.0  VBG Lactate: 1.9      MICROBIOLOGY:     Culture - Blood (collected 02 Aug 2024 13:20)  Source: .Blood Blood-Peripheral  Preliminary Report (03 Aug 2024 18:01):    No growth at 24 hours    Culture - Urine (collected 02 Aug 2024 05:21)  Source: Clean Catch Clean Catch (Midstream)  Preliminary Report (03 Aug 2024 23:57):    >100,000 CFU/ml Gram Negative Rods    Culture - Blood (collected 02 Aug 2024 01:24)  Source: .Blood Blood-Peripheral  Preliminary Report (03 Aug 2024 06:02):    No growth at 24 hours           Interval Events: No acute events overnight. Denies chest pain, SOB, palpitations.     REVIEW OF SYSTEMS:  All other review of systems is negative unless indicated above.    OBJECTIVE:  ICU Vital Signs Last 24 Hrs  T(C): 36.3 (04 Aug 2024 04:23), Max: 36.6 (03 Aug 2024 20:23)  T(F): 97.4 (04 Aug 2024 04:23), Max: 97.9 (03 Aug 2024 20:23)  HR: 64 (04 Aug 2024 04:23) (64 - 87)  BP: 103/62 (04 Aug 2024 04:23) (89/57 - 110/75)  BP(mean): --  ABP: --  ABP(mean): --  RR: 18 (04 Aug 2024 04:23) (18 - 19)  SpO2: 98% (04 Aug 2024 04:23) (96% - 99%)    O2 Parameters below as of 04 Aug 2024 04:23  Patient On (Oxygen Delivery Method): room air              08-02 @ 07:01  -  08-03 @ 07:00  --------------------------------------------------------  IN: 325 mL / OUT: 0 mL / NET: 325 mL      CAPILLARY BLOOD GLUCOSE      POCT Blood Glucose.: 122 mg/dL (04 Aug 2024 00:21)      PHYSICAL EXAM:  General: Tired appearing but interactive  Respiratory: coughing, CTAB, normal respiratory effort, wheezes, crackles, or rales.  CV: Tachycardic, irregular rhythm  Abdominal: Soft, nontender, nondistended, no rebound or guarding  Neurology: AOx2-3, no focal neuro defects, LACY x 4  Extremities: Significant LE pitting edema    HOSPITAL MEDICATIONS:  MEDICATIONS  (STANDING):  albuterol/ipratropium for Nebulization 3 milliLiter(s) Nebulizer every 6 hours  cefepime   IVPB 2000 milliGRAM(s) IV Intermittent every 8 hours  cefepime   IVPB      dextrose 50% Injectable 25 Gram(s) IV Push once  digoxin     Tablet 125 MICROGram(s) Oral every other day  folic acid Injectable 1 milliGRAM(s) IV Push daily  insulin lispro (ADMELOG) corrective regimen sliding scale   SubCutaneous every 6 hours  metoprolol tartrate 12.5 milliGRAM(s) Oral two times a day  midodrine 10 milliGRAM(s) Oral every 8 hours  octreotide  Injectable 250 MICROGram(s) IV Push three times a day  pantoprazole  Injectable 40 milliGRAM(s) IV Push every 12 hours  sodium chloride 3%  Inhalation 4 milliLiter(s) Inhalation every 12 hours  thiamine Injectable 100 milliGRAM(s) IV Push daily    MEDICATIONS  (PRN):      LABS:                        8.4    10.86 )-----------( 121      ( 03 Aug 2024 17:11 )             25.2     Hgb Trend: 8.4<--, 7.7<--, 8.9<--, 7.6<--, 7.9<--  08-03    147<H>  |  111<H>  |  28<H>  ----------------------------<  128<H>  3.8   |  23  |  0.90    Ca    8.0<L>      03 Aug 2024 05:12  Phos  2.9     08-02  Mg     1.8     08-02    TPro  4.7<L>  /  Alb  2.6<L>  /  TBili  0.7  /  DBili  x   /  AST  29  /  ALT  17  /  AlkPhos  117  08-03    Creatinine Trend: 0.90<--, 0.87<--, 0.79<--, 0.91<--, 0.85<--, 0.84<--  PT/INR - ( 03 Aug 2024 17:11 )   PT: 12.6 sec;   INR: 1.15 ratio         PTT - ( 03 Aug 2024 17:11 )  PTT:26.0 sec  Urinalysis Basic - ( 03 Aug 2024 05:12 )    Color: x / Appearance: x / SG: x / pH: x  Gluc: 128 mg/dL / Ketone: x  / Bili: x / Urobili: x   Blood: x / Protein: x / Nitrite: x   Leuk Esterase: x / RBC: x / WBC x   Sq Epi: x / Non Sq Epi: x / Bacteria: x        Venous Blood Gas:  08-02 @ 08:35  7.37/44/39/25/68.0  VBG Lactate: 1.9      MICROBIOLOGY:     Culture - Blood (collected 02 Aug 2024 13:20)  Source: .Blood Blood-Peripheral  Preliminary Report (03 Aug 2024 18:01):    No growth at 24 hours    Culture - Urine (collected 02 Aug 2024 05:21)  Source: Clean Catch Clean Catch (Midstream)  Preliminary Report (03 Aug 2024 23:57):    >100,000 CFU/ml Gram Negative Rods    Culture - Blood (collected 02 Aug 2024 01:24)  Source: .Blood Blood-Peripheral  Preliminary Report (03 Aug 2024 06:02):    No growth at 24 hours

## 2024-08-04 NOTE — PROGRESS NOTE ADULT - SUBJECTIVE AND OBJECTIVE BOX
DATE OF SERVICE: 08-04-24 @ 17:35    Patient is a 79y old  Male who presents with a chief complaint of hypotension (04 Aug 2024 12:25)      INTERVAL HISTORY: feels ok    TELEMETRY Personally reviewed: no events  	  MEDICATIONS:  digoxin     Tablet 125 MICROGram(s) Oral every other day  metoprolol tartrate 12.5 milliGRAM(s) Oral two times a day  midodrine 10 milliGRAM(s) Oral every 8 hours        PHYSICAL EXAM:  T(C): 36.4 (08-04-24 @ 11:32), Max: 36.6 (08-03-24 @ 20:23)  HR: 94 (08-04-24 @ 11:32) (64 - 94)  BP: 98/63 (08-04-24 @ 11:32) (96/60 - 103/62)  RR: 18 (08-04-24 @ 11:32) (18 - 18)  SpO2: 100% (08-04-24 @ 11:32) (97% - 100%)  Wt(kg): --  I&O's Summary        Appearance: In no distress	  HEENT:    PERRL, EOMI	  Cardiovascular:  S1 S2, No JVD  Respiratory: Lungs clear to auscultation	  Gastrointestinal:  Soft, Non-tender, + BS	  Vascularature:  No edema of LE  Psychiatric: Appropriate affect   Neuro: no acute focal deficits                               9.4    9.96  )-----------( 121      ( 04 Aug 2024 10:23 )             29.5     08-04    150<H>  |  113<H>  |  26<H>  ----------------------------<  109<H>  3.8   |  24  |  0.83    Ca    7.9<L>      04 Aug 2024 10:23  Phos  2.3     08-04  Mg     1.6     08-04    TPro  4.6<L>  /  Alb  2.4<L>  /  TBili  0.8  /  DBili  x   /  AST  31  /  ALT  18  /  AlkPhos  113  08-04        Labs personally reviewed      ASSESSMENT/PLAN: 	    78-year-old male multiple medical problems history of hepatocellular carcinoma status post radiation therapy, AF s/p DCCV on Eliquis who was found to be hypotensive following NST. Patient has reported general weakness, fatigue, lightheadedness since being discharged from hospital. Reports mild chest discomfort in midsternal region. Reports dyspnea with minimal exertion. Also reports significant lack of appetite and poor PO intake. No dark or bloody stool, nausea or vomiting.       Problem/Plan - 1:  ·  Problem: Hypotension  ·  Plan: Continue with pressors in MICU  - Patient presents with hypotension and also RAZIA. Has known orthostatic hypotension.   - Patient and wife report decreased PO intake likely 2/2 malignancy.  - GIB 7/9, s/p 4 units prbc, IR for mesenteric embolization, now in MICU on pressors  - c/w Midodrine 10mg PO [id number]     Problem/Plan - 2:  ·  Problem: CAD.   ·  Plan: Recent PCI to pLAD in June 2023  - ECG non-ischemic  - Plavix held given large melena; ideally should be on Plavix but high risk to resume      Problem/Plan - 3:  ·  Problem: Atrial Fibrillation  - s/p succesful DCCV 10/31  - Hold Eliquis 5mg BID given GIB  - s/p Amio  - However would advise against chemical cardioversion off AC if possible  - HR elevated. Dig increased to 62.5mcg IVP daily  - Metoprolol 12.5mg BID via NGT initiated--> HR and BP improving with rate control        Hank Hayes DO [facility]  Cardiovascular Medicine  [address] [place] [address], [address]  Office: [phone]  Available via Text/call on Microsoft Teams

## 2024-08-04 NOTE — PROGRESS NOTE ADULT - PROBLEM SELECTOR PLAN 3
Patient with chronic afib unable to be on AC for this and seemingly minimally responsive to amiodarone. Cardiology following, no obvious underlying cause aside from hemorrhage, which is difficult to control.  - Continue digoxin per cardiology  - Digoxin 125 EOD  - F/U Trough  - Holding eliquis

## 2024-08-05 NOTE — PROGRESS NOTE ADULT - ATTENDING COMMENTS
78yo M pmh metastatic neuroendocrine pancreatic cancer, CAD s/p PCI x3 with stents placed on 6/12/24, chronic afib, orthostatic hypotension on midodrine, and PAD admitted 7/2 UGIB and hemorrhagic shock requiring MICU for pressors, course complicated by C Diff, afib RVR, and RAZIA iso hypotension, and sepsis due to UTI on abx guarded prognosis pending palliative reconsult.    1. sepsis due to UTI: Patient prolonged hospitalization and now with fevers and tachycardia. Poor cough especially in the setting of repeated intubations and extubations last extubated on 7/28. Not feeling SOB, but will get imaging, cultures, and start abx. UCx 8/2 showing gram negative rods. Patient denying dysuria.  - Not multi-drug resistant pseudomonas in last sputum culture, so will start with one agent for now.   - C/w cefepime 2g q8 for 7d (started 8/2)  - F/u UCx and sensitivities  - F/u Bcx 8/2; NGTD.    2. Hemorrhagic shock: Patient with persistent UGIB with coffee ground emesis and melanotic stools multiple times requiring IR embolization initially and then EGD with clipping for duodenal ulcer. Patient has been off of his aspirin and plavix for his very recent stent and Eliquis for his chronic afib since he has required so many repeat transfusions due to this active blood loss. The hemorrhagic shock seems to have temporarily resolved. Hgb seems to be stable as well. CTAP 8/4 shows no active bleeding.   - Hold all AC and anti-platelet agents  - Protonix BID per GI  - No acute surgical intervention at this time given CTAP findings  - Change CBC to q24 unless having active bleeding  - Transfuse Hgb <8    3. Chronic atrial fibrillation: unable to be on AC for this and seemingly minimally responsive to amiodarone. Cardiology following, no obvious underlying cause aside from hemorrhage, which is difficult to control. Most recent digoxin trough low at 0.7.  - Continue digoxin per cardiology  - Digoxin 125 EOD  - Holding eliquis.    4. CAD: multiple stents done as recently as 6/2024 but unable to be on DAPT due to persistent GIB.  - At risk for stent closure, but difficult to manage due to persistent GIB and recent hemorrhagic shock with multiple events requiring frequent transfusions in just the past several weeks.    5. Neuroendocrine carcinoma: Patient with known pancreatic neuroendocrine tumor and was on lanreotide. Was later on radiotherapy but due to multiple hospitalizations, was unable to receive follow-up doses. Patient did have PET done in May that showed lesions suspicious for mets. CT also in May showed increased hepatic metastases.   - Continue octreotide 250mcg TID.    6. GOC: currently NPO with NG tube.  Pending Barium Swallow scheduled for 1:30pm tomorrow if patient continues to be amenable.  Palliative reconsult to assist with conversations given multiple comorbididities and prolonged hospitalization    7. DM2: controlled on slide scale    8. cdiff: s/p oral vanc    contact: TEAMS

## 2024-08-05 NOTE — CHART NOTE - NSCHARTNOTEFT_GEN_A_CORE
Received call from radiology reading room that there is no evidence of extravasation on imaging noted in the CT AP. No acute surgical intervention at this time. Please don't hesitate to contact trauma if patient status changes.    < from: CT Abdomen and Pelvis w/ IV Cont (08.04.24 @ 20:10) >    NTERPRETATION:  CLINICAL INFORMATION: Decreased hemoglobin. History of   duodenal hemorrhage, status post GDA embolization. Metastatic pancreatic   neuroendocrine tumor.    COMPARISON: July 20, 2024, July 9, 2024.    CONTRAST/COMPLICATIONS:  IV Contrast: Omnipaque 350  90 cc administered   10 cc discarded  Oral Contrast: NONE  Complications: None reported at time of study completion    PROCEDURE:  CT of the Abdomen and Pelvis was performed.  Precontrast, Arterial and Delayed phases were performed.  Sagittal and coronal reformats were performed.    FINDINGS:  LOWER CHEST: There is been interval increase in size of now moderate   bilateral layering pleural effusions, left greater than right, with   severe left lower lobe and moderate right lower lobe dependent   compressive atelectasis.    LIVER: Again is noted a shrunken liver containing numerous   hypoattenuating peripherallyenhancing metastatic lesions measuring up to   7.4 cm in segment 7. The hepatic veins and portal vein are patent.  BILE DUCTS: Normal caliber.  GALLBLADDER: Removed.  SPLEEN: Within normal limits.  PANCREAS: Within normal limits.  ADRENALS: Within normal limits.  KIDNEYS/URETERS: For millimeter nonobstructing stone in the midpole of   the left kidney. Small bilateral renal cysts. Mild bilateral renal   cortical irregularity and thinning, compatible with scarring. Mild   bilateral perinephric stranding. No evidence of hydronephrosis or mass   bilaterally.    BLADDER: Large layering bladder stones. Small nondependent gas in the   bladder lumen.  REPRODUCTIVE ORGANS: The prostate is not enlarged.    BOWEL: There is a Dobbhoff tube with the tip in the gastric cardia.   Status post embolization of the gastroduodenal artery. Endoluminal clips   in the first and third portions of the duodenum. No active intraluminal   extravasation is identified.  PERITONEUM/RETROPERITONEUM: Within trace free fluid throughout the   peritoneal cavity. There is no evidence of hyperattenuating collection   within the peritoneal cavity or retroperitoneum to suggest acute   hemorrhage. Normal limits.  VESSELS: Within normal limits.  LYMPH NODES: No lymphadenopathy.  ABDOMINAL WALL: Stranding in the right groin compatible with recent   catheterization.  BONES: Right total hip replacement. Status post transpedicular screw and   krishna fixation of the thoracolumbar spine. Anterior L5-S1 discectomy and   spacer placement.    IMPRESSION: No evidence of active intraluminal extravasation of contrast.   No evidence of acute intraperitoneal or retroperitoneal hemorrhage.    --- End of Report ---            LELA GHOSH MD; Attending Radiologist  This document has been electronically signed. Aug  5 2024  8:32AM    < end of copied text >    Griffin Tripp MD PGY-1  Trauma [facility]  [id number] / [phone]

## 2024-08-05 NOTE — PROGRESS NOTE ADULT - PROBLEM SELECTOR PLAN 1
Patient prolonged hospitalization and now with fevers and tachycardia. Poor cough especially in the setting of repeated intubations and extubations last extubated on 7/28. Not feeling SOB, but will get imaging, cultures, and start abx  - Not multi-drug resistant pseudomonas in last sputum culture, so will start with one agent for now.   - C/w cefepime 2g q8 for 7d  - F/u Bcx 8/2; NGTD   - UCx 8/2 - gram negative rods   - C/w Cefepime 2g q8 7d for empiric coverage Patient prolonged hospitalization and now with fevers and tachycardia. Poor cough especially in the setting of repeated intubations and extubations last extubated on 7/28. Not feeling SOB, but will get imaging, cultures, and start abx. UCx 8/2 showing gram negative rods. Patient denying dysuria.  - Not multi-drug resistant pseudomonas in last sputum culture, so will start with one agent for now.   - C/w cefepime 2g q8 for 7d (started 8/2)  - F/u UCx and sensitivities  - F/u Bcx 8/2; NGTD

## 2024-08-05 NOTE — PROGRESS NOTE ADULT - PROBLEM SELECTOR PLAN 7
Patient with failure to thrive iso metastatic disease as well as repeated intubation and difficulty swallowing so pending barium swallow  - consider Barium swallow Monday pending stability Patient with failure to thrive iso metastatic disease as well as repeated intubation and difficulty swallowing so pending barium swallow. Patient this afternoon (8/5) states willingness to try and family is in agreement.  - Barium Swallow to be performed  - Currently NPO with NG tube due to aspiration risk, but can consider tube feeds; d/c fluids if tube feeds restarted today Patient with failure to thrive iso metastatic disease as well as repeated intubation and difficulty swallowing so pending barium swallow. Patient this afternoon (8/5) states willingness to try and family is in agreement.  - Barium Swallow scheduled for 1:30pm tomorrow if patient continues to be amenable  - Currently NPO with NG tube due to aspiration risk, but can consider tube feeds; d/c fluids if tube feeds restarted today

## 2024-08-05 NOTE — PROGRESS NOTE ADULT - SUBJECTIVE AND OBJECTIVE BOX
Patient is a 79y old  Male who presents with a chief complaint of hypotension (05 Aug 2024 12:57)    Patient seen and examined at bedside, appears very tired, eyes open/closed during exam.     MEDICATIONS  (STANDING):  cefepime   IVPB      cefepime   IVPB 2000 milliGRAM(s) IV Intermittent every 8 hours  dextrose 5% + sodium chloride 0.45%. 1000 milliLiter(s) (100 mL/Hr) IV Continuous <Continuous>  dextrose 50% Injectable 25 Gram(s) IV Push once  digoxin     Tablet 125 MICROGram(s) Oral every other day  folic acid Injectable 1 milliGRAM(s) IV Push daily  insulin lispro (ADMELOG) corrective regimen sliding scale   SubCutaneous every 6 hours  metoprolol tartrate 12.5 milliGRAM(s) Oral two times a day  midodrine 10 milliGRAM(s) Oral every 8 hours  octreotide  Injectable 250 MICROGram(s) IV Push three times a day  pantoprazole  Injectable 40 milliGRAM(s) IV Push every 12 hours  sodium chloride 3%  Inhalation 4 milliLiter(s) Inhalation every 12 hours  thiamine Injectable 100 milliGRAM(s) IV Push daily    MEDICATIONS  (PRN):    Vital Signs Last 24 Hrs  T(C): 36.4 (05 Aug 2024 10:15), Max: 36.9 (05 Aug 2024 04:03)  T(F): 97.6 (05 Aug 2024 10:15), Max: 98.5 (05 Aug 2024 04:03)  HR: 79 (05 Aug 2024 10:15) (77 - 104)  BP: 97/63 (05 Aug 2024 10:15) (97/58 - 102/68)  BP(mean): --  RR: 18 (05 Aug 2024 10:15) (18 - 18)  SpO2: 98% (05 Aug 2024 10:15) (97% - 100%)    Parameters below as of 05 Aug 2024 10:15  Patient On (Oxygen Delivery Method): nasal cannula  O2 Flow (L/min): 2    PE  NAD  Awake, lethargic  Anicteric, MMM  B/l LE edema  No rash grossly                          8.4    8.49  )-----------( 109      ( 05 Aug 2024 04:32 )             26.0     08-05    146<H>  |  111<H>  |  22  ----------------------------<  152<H>  3.8   |  23  |  0.76    Ca    7.6<L>      05 Aug 2024 11:28  Phos  2.3     08-04  Mg     1.6     08-04    TPro  4.5<L>  /  Alb  2.3<L>  /  TBili  0.7  /  DBili  x   /  AST  30  /  ALT  16  /  AlkPhos  102  08-05

## 2024-08-05 NOTE — PROGRESS NOTE ADULT - SUBJECTIVE AND OBJECTIVE BOX
DATE OF SERVICE: 08-05-24 @ 12:57    Patient is a 79y old  Male who presents with a chief complaint of hypotension (05 Aug 2024 04:26)      INTERVAL HISTORY: no acute events    REVIEW OF SYSTEMS:  CONSTITUTIONAL: No weakness  EYES/ENT: No visual changes;  No throat pain   NECK: No pain or stiffness  RESPIRATORY: No cough, wheezing; No shortness of breath  CARDIOVASCULAR: No chest pain or palpitations  GASTROINTESTINAL: No abdominal  pain. No nausea, vomiting, or hematemesis  GENITOURINARY: No dysuria, frequency or hematuria  NEUROLOGICAL: No stroke like symptoms  SKIN: No rashes    TELEMETRY Personally reviewed: AFib 70s-90s  	  MEDICATIONS:  digoxin     Tablet 125 MICROGram(s) Oral every other day  metoprolol tartrate 12.5 milliGRAM(s) Oral two times a day  midodrine 10 milliGRAM(s) Oral every 8 hours        PHYSICAL EXAM:  T(C): 36.4 (08-05-24 @ 10:15), Max: 36.9 (08-05-24 @ 04:03)  HR: 79 (08-05-24 @ 10:15) (77 - 104)  BP: 97/63 (08-05-24 @ 10:15) (97/58 - 102/68)  RR: 18 (08-05-24 @ 10:15) (18 - 18)  SpO2: 98% (08-05-24 @ 10:15) (97% - 100%)  Wt(kg): --  I&O's Summary    04 Aug 2024 07:01  -  05 Aug 2024 07:00  --------------------------------------------------------  IN: 500 mL / OUT: 0 mL / NET: 500 mL          Appearance: In no distress	  HEENT:    PERRL, EOMI	  Cardiovascular:  S1 S2, No JVD  Respiratory: Lungs clear to auscultation	  Gastrointestinal:  Soft, Non-tender, + BS	  Vascularature:  No edema of LE  Psychiatric: Appropriate affect   Neuro: no acute focal deficits                               8.4    8.49  )-----------( 109      ( 05 Aug 2024 04:32 )             26.0     08-05    146<H>  |  111<H>  |  22  ----------------------------<  152<H>  3.8   |  23  |  0.76    Ca    7.6<L>      05 Aug 2024 11:28  Phos  2.3     08-04  Mg     1.6     08-04    TPro  4.5<L>  /  Alb  2.3<L>  /  TBili  0.7  /  DBili  x   /  AST  30  /  ALT  16  /  AlkPhos  102  08-05        Labs personally reviewed      ASSESSMENT/PLAN: 	    78-year-old male multiple medical problems history of hepatocellular carcinoma status post radiation therapy, AF s/p DCCV on Eliquis who was found to be hypotensive following NST. Patient has reported general weakness, fatigue, lightheadedness since being discharged from hospital. Reports mild chest discomfort in midsternal region. Reports dyspnea with minimal exertion. Also reports significant lack of appetite and poor PO intake. No dark or bloody stool, nausea or vomiting.       Problem/Plan - 1:  ·  Problem: Hypotension  ·  Plan: Continue with pressors in MICU.  Now transferred to 4mon  - Patient presents with hypotension and also RAZIA. Has known orthostatic hypotension.   - Patient and wife report decreased PO intake likely 2/2 malignancy.  - GIB 7/9, s/p 4 units prbc, IR for mesenteric embolization, now in MICU on pressors  - c/w Midodrine 10mg PO [id number]     Problem/Plan - 2:  ·  Problem: CAD.   ·  Plan: Recent PCI to pLAD in June 2023  - ECG non-ischemic  - Plavix held given large melena; ideally should be on Plavix but high risk to resume      Problem/Plan - 3:  ·  Problem: Atrial Fibrillation  - s/p succesful DCCV 10/31  - Hold Eliquis 5mg BID given GIB  - s/p Amio  - However would advise against chemical cardioversion off AC if possible  - HR elevated. Dig increased to 62.5mcg IVP daily  - Metoprolol 12.5mg BID via NGT initiated--> HR and BP improving with rate control          Iolani Behrbom, AG-ESTEFANI Hayes DO [facility]  Cardiovascular Medicine  [address], [address]  Available through call or text on [employer]  Office: [phone]

## 2024-08-05 NOTE — PROGRESS NOTE ADULT - ASSESSMENT
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 Transferred to floors with persistent melena and downtrending Hgb; pending further surgical evaluation 79M hx of metastatic neuroendocrine pancreatic cancer, hx of CAD s/p PCI x3 with stents placed on 6/12/24, chronic afib, orthostatic hypotension on midodrine, and PAD here for UGIB and hemorrhagic shock requiring MICU for pressors, course complicated by C Diff, afib RVR, and RAZIA iso hypotension. Transferred to floors with persistent melena and downtrending Hgb; currently no acute surgical intervention recommended.

## 2024-08-05 NOTE — PROGRESS NOTE ADULT - PROBLEM SELECTOR PLAN 2
Patient with persistent UGIB with coffee ground emesis and melanotic stools multiple times requiring IR embolization initially and then EGD with clipping for duodenal ulcer. Patient has been off of his aspirin and plavix for his very recent stent and Eliquis for his chronic afib since he has required so many repeat transfusions due to this active blood loss. The hemorrhagic shock seems to have temporarily resolved. No transfusions since 7/28. Hgb seems to be stable as well.  - Hold all AC and anti-platelet agents  - Protonix BID per GI  - f/u CBC Q12H  - F/u CTAP IV contrast today 8/4  - Patient with continued Melena; will follow up with ACS/Trauma  - can consider iron chelation therapy given many transfusions  - Maintain T&S, CBCs Patient with persistent UGIB with coffee ground emesis and melanotic stools multiple times requiring IR embolization initially and then EGD with clipping for duodenal ulcer. Patient has been off of his aspirin and plavix for his very recent stent and Eliquis for his chronic afib since he has required so many repeat transfusions due to this active blood loss. The hemorrhagic shock seems to have temporarily resolved. Hgb seems to be stable as well. CTAP 8/4 shows no active bleeding.  - Hold all AC and anti-platelet agents  - Protonix BID per GI  - No acute surgical intervention at this time given CTAP findings  - Change CBC to q24 unless having active bleeding  - Transfuse Hgb <8  - Can consider iron chelation therapy given many transfusions  - Maintain T&S, CBCs

## 2024-08-05 NOTE — PROGRESS NOTE ADULT - ASSESSMENT
79 yo male with PMH of CAD s/p PCI x3 with most recent stent 6/12/24 on Plavix, chronic Afib on eliquis, orthostatic hypotension on midodrine, PAD, neuroendocrine tumor with RT currently on hold, recent admission for dx cath on 6/24/24 who presented for hypotension, admitted for GIB and hemorrhagic shock. Found to be bleeding from duodenum. Transferred to MICU, on pressors.     1. Pancreatic NET- metastatic   -- was on lanreotide then had POD in liver and started on peptide receptor radiotherapy (PRRT)- typically given every 8 weeks for 4 doses. He received one dose on 10/20/2023 and planned to get his 2nd dose at the end of December however his course was complicated by multiple hospitalizations that were noncancer related (decompensated heart failure).   -- 5/28/24 PET Dotatate (compared to 9/2023): several new somatostatin avid osseous lesions, some faintly sclerotic, suspicious for mets. Increased upper RP nodes, probably metastatic. Decreased intensely tracer avid right supradiaphragmatic and upper abdominal nodes, suspicious for metastasis. Redemonstrated intensely somatostatin avid bilobar hepatic lesions, consistent with metastases again morphologically difficult to delineate.   -- CT reviewed by [facility]: SINCE MAY 8 2024 INCREASED HEPATIC METASTASES, NEWLY SEEN PRIMARY TUMOR IN THE PANCREAS, and active bleeding within the duodenum with associated intraluminal hematoma.   -- chromogranin level is elevated at 2058, but [place] [facility] [place]   -- f/u with Dr. Sophia Prasad at [facility] after discharge, no plan for chemotherapy inpatient, if clinically improves/stabilizes then can be considered for treatment outpatient  --Continue octreotide 250mcg TID    2. Duodenal bleed, Hemorrhagic shock  - Now transferred to medicine from MICU, s/p extubation 7/22. NG tube in place.  - CT w/ bleed in duodenum. GI called, EGD 7/9 -> S/p  IR embolization 7/9, intubated in MICU -> s/p extubation 7/15 -> intubated 7/18  - s/p multiple transfusions, fibrinogen low would recommend Cryo for < 100, but coags have improved as are essentially normal now so unlikely, probably was related to liver dysfunction.   - s/p repeat EGD 7/12 showing duodenal lesions w/clots and oozing, no clear targets for intervention and no active bleeding, purastat applied to all areas of oozing.   - S/p EGD 7/18: duodenal ulcer with active oozing, ulcer with visible vessel. Bleeding treated.   - s/p multiple transfusions, per GI, high risk for rebleed, will continue PPI.   - Repeat CT AP w/ No evidence of GI bleed.  Interval endoscopic versus surgical intervention with metallic streak artifact suggestive of surgical clips in the region of the proximal one third portions of the duodenum. Evaluation of the previously seen hematoma is limited secondary to this artifact. Moderate bilateral pleural effusions and associated compressive atelectasis, right greater than left, overall slightly increased from previous CT. Anasarca.  - Per IR, In the absences of any active extravasation on CTA there's no place to target for an embolization  - 7/29 sputum culture + Pseudomonas; RRT on 8/2 due to tachycardia, hypotension; febrile to 101. Currently on cefepime x 7 days  - Continue octreotide 250 mcg TID  - Per surgery, management of refractory duodenal ulcer involves a large surgery likely laparotomy. Will follow up further surgical recommendations.  - CT AP 8/4: No evidence of active intraluminal extravasation of contrast. No evidence of acute intraperitoneal or retroperitoneal hemorrhage.    3. C. diff   - diarrhea resolved, off vanco    Will continue to follow.    Hugo Topete PA-C  Hematology/Oncology  [facility]  [phone] (office)

## 2024-08-05 NOTE — PROGRESS NOTE ADULT - SUBJECTIVE AND OBJECTIVE BOX
Patient is a 79y old  Male who presents with a chief complaint of hypotension (04 Aug 2024 17:35)      SUBJECTIVE / OVERNIGHT EVENTS:    MEDICATIONS  (STANDING):  albuterol/ipratropium for Nebulization 3 milliLiter(s) Nebulizer every 6 hours  cefepime   IVPB      cefepime   IVPB 2000 milliGRAM(s) IV Intermittent every 8 hours  dextrose 5% + sodium chloride 0.45%. 1000 milliLiter(s) (100 mL/Hr) IV Continuous <Continuous>  dextrose 50% Injectable 25 Gram(s) IV Push once  digoxin     Tablet 125 MICROGram(s) Oral every other day  folic acid Injectable 1 milliGRAM(s) IV Push daily  insulin lispro (ADMELOG) corrective regimen sliding scale   SubCutaneous every 6 hours  metoprolol tartrate 12.5 milliGRAM(s) Oral two times a day  midodrine 10 milliGRAM(s) Oral every 8 hours  octreotide  Injectable 250 MICROGram(s) IV Push three times a day  pantoprazole  Injectable 40 milliGRAM(s) IV Push every 12 hours  sodium chloride 3%  Inhalation 4 milliLiter(s) Inhalation every 12 hours  thiamine Injectable 100 milliGRAM(s) IV Push daily    MEDICATIONS  (PRN):      CAPILLARY BLOOD GLUCOSE      POCT Blood Glucose.: 121 mg/dL (05 Aug 2024 00:00)  POCT Blood Glucose.: 114 mg/dL (04 Aug 2024 21:42)  POCT Blood Glucose.: 113 mg/dL (04 Aug 2024 18:52)  POCT Blood Glucose.: 118 mg/dL (04 Aug 2024 11:42)  POCT Blood Glucose.: 125 mg/dL (04 Aug 2024 06:18)    I&O's Summary    04 Aug 2024 07:01  -  05 Aug 2024 04:27  --------------------------------------------------------  IN: 500 mL / OUT: 0 mL / NET: 500 mL        Vital Signs Last 24 Hrs  T(C): 36.4 (04 Aug 2024 23:57), Max: 36.4 (04 Aug 2024 11:32)  T(F): 97.5 (04 Aug 2024 23:57), Max: 97.6 (04 Aug 2024 11:32)  HR: 98 (04 Aug 2024 23:57) (90 - 99)  BP: 101/64 (04 Aug 2024 23:57) (98/63 - 102/68)  BP(mean): --  RR: 18 (04 Aug 2024 23:57) (18 - 18)  SpO2: 97% (04 Aug 2024 23:57) (97% - 100%)    Parameters below as of 04 Aug 2024 23:57  Patient On (Oxygen Delivery Method): nasal cannula  O2 Flow (L/min): 2      PHYSICAL EXAM:  GENERAL: NAD, well-developed, well-nourished  HEAD: Atraumatic, Normocephalic  EYES: EOMI, PERRLA, conjunctiva and sclera clear  NECK: Supple, No JVD  CHEST/LUNG: Clear to auscultation bilaterally; No wheezes or crackles  HEART: Normal S1/S2; Regular rate and rhythm; No murmurs, rubs, or gallops  ABDOMEN: Soft, Nontender, Nondistended; Bowel sounds present  EXTREMITIES: 2+ Peripheral Pulses; No clubbing, cyanosis, or edema  PSYCH: A&Ox3  NEUROLOGY: no focal neurologic deficit  SKIN: No rashes or lesions    LABS:                        See note  See Note )-----------( See note    ( 05 Aug 2024 01:19 )             See note     08-04    150<H>  |  113<H>  |  26<H>  ----------------------------<  109<H>  3.8   |  24  |  0.83    Ca    7.9<L>      04 Aug 2024 10:23  Phos  2.3     08-04  Mg     1.6     08-04    TPro  4.6<L>  /  Alb  2.4<L>  /  TBili  0.8  /  DBili  x   /  AST  31  /  ALT  18  /  AlkPhos  113  08-04    PT/INR - ( 04 Aug 2024 10:23 )   PT: 12.8 sec;   INR: 1.23 ratio         PTT - ( 04 Aug 2024 10:23 )  PTT:26.9 sec      Urinalysis Basic - ( 04 Aug 2024 10:23 )    Color: x / Appearance: x / SG: x / pH: x  Gluc: 109 mg/dL / Ketone: x  / Bili: x / Urobili: x   Blood: x / Protein: x / Nitrite: x   Leuk Esterase: x / RBC: x / WBC x   Sq Epi: x / Non Sq Epi: x / Bacteria: x        RADIOLOGY & ADDITIONAL TESTS:    Imaging Personally Reviewed:    Consultant(s) Notes Reviewed:      Care Discussed with Consultants/Other Providers:   Patient is a 79y old  Male who presents with a chief complaint of hypotension (05 Aug 2024 04:26)      SUBJECTIVE / OVERNIGHT EVENTS:    MEDICATIONS  (STANDING):  albuterol/ipratropium for Nebulization 3 milliLiter(s) Nebulizer every 6 hours  cefepime   IVPB 2000 milliGRAM(s) IV Intermittent every 8 hours  cefepime   IVPB      dextrose 5% + sodium chloride 0.45%. 1000 milliLiter(s) (100 mL/Hr) IV Continuous <Continuous>  dextrose 50% Injectable 25 Gram(s) IV Push once  digoxin     Tablet 125 MICROGram(s) Oral every other day  folic acid Injectable 1 milliGRAM(s) IV Push daily  insulin lispro (ADMELOG) corrective regimen sliding scale   SubCutaneous every 6 hours  metoprolol tartrate 12.5 milliGRAM(s) Oral two times a day  midodrine 10 milliGRAM(s) Oral every 8 hours  octreotide  Injectable 250 MICROGram(s) IV Push three times a day  pantoprazole  Injectable 40 milliGRAM(s) IV Push every 12 hours  sodium chloride 3%  Inhalation 4 milliLiter(s) Inhalation every 12 hours  thiamine Injectable 100 milliGRAM(s) IV Push daily    MEDICATIONS  (PRN):      CAPILLARY BLOOD GLUCOSE      POCT Blood Glucose.: 154 mg/dL (05 Aug 2024 06:00)  POCT Blood Glucose.: 121 mg/dL (05 Aug 2024 00:00)  POCT Blood Glucose.: 114 mg/dL (04 Aug 2024 21:42)  POCT Blood Glucose.: 113 mg/dL (04 Aug 2024 18:52)  POCT Blood Glucose.: 118 mg/dL (04 Aug 2024 11:42)  POCT Blood Glucose.: 125 mg/dL (04 Aug 2024 06:18)    I&O's Summary    04 Aug 2024 07:01  -  05 Aug 2024 06:12  --------------------------------------------------------  IN: 500 mL / OUT: 0 mL / NET: 500 mL        Vital Signs Last 24 Hrs  T(C): 36.9 (05 Aug 2024 04:03), Max: 36.9 (05 Aug 2024 04:03)  T(F): 98.5 (05 Aug 2024 04:03), Max: 98.5 (05 Aug 2024 04:03)  HR: 104 (05 Aug 2024 04:03) (90 - 104)  BP: 99/66 (05 Aug 2024 04:03) (98/63 - 102/68)  BP(mean): --  RR: 18 (05 Aug 2024 04:03) (18 - 18)  SpO2: 98% (05 Aug 2024 04:03) (97% - 100%)    Parameters below as of 05 Aug 2024 04:03  Patient On (Oxygen Delivery Method): nasal cannula  O2 Flow (L/min): 2      PHYSICAL EXAM:  GENERAL: NAD, well-developed, well-nourished  HEAD: Atraumatic, Normocephalic  EYES: EOMI, PERRLA, conjunctiva and sclera clear  NECK: Supple, No JVD  CHEST/LUNG: Clear to auscultation bilaterally; No wheezes or crackles  HEART: Normal S1/S2; Regular rate and rhythm; No murmurs, rubs, or gallops  ABDOMEN: Soft, Nontender, Nondistended; Bowel sounds present  EXTREMITIES: 2+ Peripheral Pulses; No clubbing, cyanosis, or edema  PSYCH: A&Ox3  NEUROLOGY: no focal neurologic deficit  SKIN: No rashes or lesions    LABS:                        8.4    8.49  )-----------( 109      ( 05 Aug 2024 04:32 )             26.0      08-04    150<H>  |  113<H>  |  26<H>  ----------------------------<  109<H>  3.8   |  24  |  0.83    Ca    7.9<L>      04 Aug 2024 10:23  Phos  2.3     08-04  Mg     1.6     08-04    TPro  4.6<L>  /  Alb  2.4<L>  /  TBili  0.8  /  DBili  x   /  AST  31  /  ALT  18  /  AlkPhos  113  08-04    PT/INR - ( 04 Aug 2024 10:23 )   PT: 12.8 sec;   INR: 1.23 ratio         PTT - ( 04 Aug 2024 10:23 )  PTT:26.9 sec      Urinalysis Basic - ( 04 Aug 2024 10:23 )    Color: x / Appearance: x / SG: x / pH: x  Gluc: 109 mg/dL / Ketone: x  / Bili: x / Urobili: x   Blood: x / Protein: x / Nitrite: x   Leuk Esterase: x / RBC: x / WBC x   Sq Epi: x / Non Sq Epi: x / Bacteria: x        RADIOLOGY & ADDITIONAL TESTS:    Imaging Personally Reviewed:    Consultant(s) Notes Reviewed:      Care Discussed with Consultants/Other Providers:   Patient is a 79y old  Male who presents with a chief complaint of hypotension (05 Aug 2024 04:26)    SUBJECTIVE / OVERNIGHT EVENTS:  Overnight, there were no reports of melena.    Today, patient examined at bedside. He expresses severe discomfort in his legs bilaterally even with the SCDs removed. He denies other complaints.    Patient checked in on again in the afternoon. Patient more comfortable appearing and smiling. He would like to try a swallowing study so that he can have PO intake.    MEDICATIONS  (STANDING):  albuterol/ipratropium for Nebulization 3 milliLiter(s) Nebulizer every 6 hours  cefepime   IVPB 2000 milliGRAM(s) IV Intermittent every 8 hours  cefepime   IVPB      dextrose 5% + sodium chloride 0.45%. 1000 milliLiter(s) (100 mL/Hr) IV Continuous <Continuous>  dextrose 50% Injectable 25 Gram(s) IV Push once  digoxin     Tablet 125 MICROGram(s) Oral every other day  folic acid Injectable 1 milliGRAM(s) IV Push daily  insulin lispro (ADMELOG) corrective regimen sliding scale   SubCutaneous every 6 hours  metoprolol tartrate 12.5 milliGRAM(s) Oral two times a day  midodrine 10 milliGRAM(s) Oral every 8 hours  octreotide  Injectable 250 MICROGram(s) IV Push three times a day  pantoprazole  Injectable 40 milliGRAM(s) IV Push every 12 hours  sodium chloride 3%  Inhalation 4 milliLiter(s) Inhalation every 12 hours  thiamine Injectable 100 milliGRAM(s) IV Push daily    MEDICATIONS  (PRN):      CAPILLARY BLOOD GLUCOSE      POCT Blood Glucose.: 154 mg/dL (05 Aug 2024 06:00)  POCT Blood Glucose.: 121 mg/dL (05 Aug 2024 00:00)  POCT Blood Glucose.: 114 mg/dL (04 Aug 2024 21:42)  POCT Blood Glucose.: 113 mg/dL (04 Aug 2024 18:52)  POCT Blood Glucose.: 118 mg/dL (04 Aug 2024 11:42)  POCT Blood Glucose.: 125 mg/dL (04 Aug 2024 06:18)    I&O's Summary    04 Aug 2024 07:01  -  05 Aug 2024 06:12  --------------------------------------------------------  IN: 500 mL / OUT: 0 mL / NET: 500 mL        Vital Signs Last 24 Hrs  T(C): 36.9 (05 Aug 2024 04:03), Max: 36.9 (05 Aug 2024 04:03)  T(F): 98.5 (05 Aug 2024 04:03), Max: 98.5 (05 Aug 2024 04:03)  HR: 104 (05 Aug 2024 04:03) (90 - 104)  BP: 99/66 (05 Aug 2024 04:03) (98/63 - 102/68)  BP(mean): --  RR: 18 (05 Aug 2024 04:03) (18 - 18)  SpO2: 98% (05 Aug 2024 04:03) (97% - 100%)    Parameters below as of 05 Aug 2024 04:03  Patient On (Oxygen Delivery Method): nasal cannula  O2 Flow (L/min): 2      PHYSICAL EXAM:  GENERAL: NAD, uncomfortable, ill-appearing, NG tube in place, NC in place, coughing  HEAD: Atraumatic, Normocephalic  EYES: EOMI, PERRLA, conjunctiva and sclera clear  NECK: Supple, No JVD  CHEST/LUNG: Coarse lung sounds on left side, cleared after cough; No wheezes or crackles  HEART: Normal S1/S2; Irregular rhythm; No murmurs, rubs, or gallops  ABDOMEN: Soft, Nontender, Nondistended  EXTREMITIES: Profoundly edematous in all extremities   PSYCH: A&Ox1-2  NEUROLOGY: no focal neurologic deficit    LABS:                        8.4    8.49  )-----------( 109      ( 05 Aug 2024 04:32 )             26.0      08-04    150<H>  |  113<H>  |  26<H>  ----------------------------<  109<H>  3.8   |  24  |  0.83    Ca    7.9<L>      04 Aug 2024 10:23  Phos  2.3     08-04  Mg     1.6     08-04    TPro  4.6<L>  /  Alb  2.4<L>  /  TBili  0.8  /  DBili  x   /  AST  31  /  ALT  18  /  AlkPhos  113  08-04    PT/INR - ( 04 Aug 2024 10:23 )   PT: 12.8 sec;   INR: 1.23 ratio         PTT - ( 04 Aug 2024 10:23 )  PTT:26.9 sec      Urinalysis Basic - ( 04 Aug 2024 10:23 )    Color: x / Appearance: x / SG: x / pH: x  Gluc: 109 mg/dL / Ketone: x  / Bili: x / Urobili: x   Blood: x / Protein: x / Nitrite: x   Leuk Esterase: x / RBC: x / WBC x   Sq Epi: x / Non Sq Epi: x / Bacteria: x        RADIOLOGY & ADDITIONAL TESTS:    Imaging Personally Reviewed: [facility] 8/4    Consultant(s) Notes Reviewed: Surgery    Care Discussed with Consultants/Other Providers: Surgery

## 2024-08-06 NOTE — PROGRESS NOTE ADULT - ASSESSMENT
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 Transferred to floors with persistent melena and downtrending Hgb; currently no acute surgical intervention recommended.

## 2024-08-06 NOTE — SWALLOW VFSS/MBS ASSESSMENT ADULT - ROSENBEK'S PENETRATION ASPIRATION SCALE
(2) contrast enters airway, remains above the vocal cords, no residue remains (penetration) (6) contrast passes glottis, no subglottic residue remains (aspiration) (5) contrast contacts vocal cords, visible residue remains (penetration) (8) contrast passes glottis, visible subglottic residue remains, absent patient response (aspiration)

## 2024-08-06 NOTE — SWALLOW VFSS/MBS ASSESSMENT ADULT - ADDITIONAL INFORMATION
+ KFT in situ  + slight calcifications of laryngeal structures   + multiple anterior cervical osteophytes at level of C4-C7  + slight reversal of typical cervical lordosis

## 2024-08-06 NOTE — PROGRESS NOTE ADULT - ATTENDING COMMENTS
78yo M pmh metastatic neuroendocrine pancreatic cancer, CAD s/p PCI x3 with stents placed on 6/12/24, chronic afib, orthostatic hypotension on midodrine, and PAD admitted 7/2 UGIB and hemorrhagic shock requiring MICU for pressors, course complicated by C Diff, afib RVR, and RAZIA iso hypotension, and sepsis due to UTI on abx guarded prognosis pending MBS and palliative reconsult.    1. sepsis due to UTI: Patient prolonged hospitalization and now with fevers and tachycardia. Poor cough especially in the setting of repeated intubations and extubations last extubated on 7/28. Not feeling SOB, but will get imaging, cultures, and start abx. UCx 8/2 showing gram negative rods. Patient denying dysuria.  - Not multi-drug resistant pseudomonas in last sputum culture, so will start with one agent for now.   - C/w cefepime 2g q8 for 7d (started 8/2-8/8)  - F/u UCx: kleb  - F/u Bcx 8/2; NGTD.    2. Hemorrhagic shock: Patient with persistent UGIB with coffee ground emesis and melanotic stools multiple times requiring IR embolization initially and then EGD with clipping for duodenal ulcer. Patient has been off of his aspirin and plavix for his very recent stent and Eliquis for his chronic afib since he has required so many repeat transfusions due to this active blood loss. The hemorrhagic shock seems to have temporarily resolved. Hgb seems to be stable as well. CTAP 8/4 shows no active bleeding.   - Hold all AC and anti-platelet agents  - Protonix BID per GI  - No acute surgical intervention at this time given CTAP findings  - Change CBC to q24 unless having active bleeding  - Transfuse Hgb <8    3. Chronic atrial fibrillation: unable to be on AC for this and seemingly minimally responsive to amiodarone. Cardiology following, no obvious underlying cause aside from hemorrhage, which is difficult to control. Most recent digoxin trough low at 0.7.  - Continue digoxin per cardiology  - Digoxin 125 EOD  - Holding eliquis.    4. CAD: multiple stents done as recently as 6/2024 but unable to be on DAPT due to persistent GIB.  - At risk for stent closure, but difficult to manage due to persistent GIB and recent hemorrhagic shock with multiple events requiring frequent transfusions in just the past several weeks.  -f/u with cards/GI about trialing plavix.  Per report, prior trial of asa 81mg let to mass transfusion needs    5. Neuroendocrine carcinoma: Patient with known pancreatic neuroendocrine tumor and was on lanreotide. Was later on radiotherapy but due to multiple hospitalizations, was unable to receive follow-up doses. Patient did have PET done in May that showed lesions suspicious for mets. CT also in May showed increased hepatic metastases.   - Continue octreotide 250mcg TID.    6. GOC: currently NPO with NG tube.  Pending Barium Swallow scheduled for 1:30pm today if patient continues to be amenable.  Palliative reconsult to assist with conversations given multiple comorbididities and prolonged hospitalization.  Per patient he lives with wife, has no HHAs, prior to hospitalization walked ~1 block.  Gets most yennifer from seeing his grandkids on the weekends.    7. DM2: controlled on slide scale    8. cdiff: s/p oral vanc    contact: TEAMS .

## 2024-08-06 NOTE — PROGRESS NOTE ADULT - SUBJECTIVE AND OBJECTIVE BOX
Patient is a 79y old  Male who presents with a chief complaint of hypotension (06 Aug 2024 04:42)    Patient seen and examined, no acute events. NG tube in.  MEDICATIONS  (STANDING):  cefepime   IVPB      cefepime   IVPB 2000 milliGRAM(s) IV Intermittent every 8 hours  dextrose 50% Injectable 25 Gram(s) IV Push once  digoxin     Tablet 125 MICROGram(s) Oral every other day  folic acid Injectable 1 milliGRAM(s) IV Push daily  insulin lispro (ADMELOG) corrective regimen sliding scale   SubCutaneous every 6 hours  metoprolol tartrate 12.5 milliGRAM(s) Oral two times a day  midodrine 10 milliGRAM(s) Oral every 8 hours  octreotide  Injectable 250 MICROGram(s) IV Push three times a day  pantoprazole  Injectable 40 milliGRAM(s) IV Push every 12 hours  potassium chloride   Powder 40 milliEquivalent(s) Oral four times a day  potassium phosphate IVPB 30 milliMole(s) IV Intermittent once  sodium chloride 3%  Inhalation 4 milliLiter(s) Inhalation every 12 hours  thiamine Injectable 100 milliGRAM(s) IV Push daily    MEDICATIONS  (PRN):      Vital Signs Last 24 Hrs  T(C): 36.7 (06 Aug 2024 04:27), Max: 36.7 (05 Aug 2024 16:09)  T(F): 98 (06 Aug 2024 04:27), Max: 98.1 (05 Aug 2024 16:09)  HR: 96 (06 Aug 2024 04:27) (84 - 96)  BP: 115/68 (06 Aug 2024 04:27) (95/60 - 115/68)  BP(mean): --  RR: 18 (06 Aug 2024 04:27) (18 - 19)  SpO2: 98% (06 Aug 2024 04:27) (96% - 100%)    Parameters below as of 06 Aug 2024 04:27  Patient On (Oxygen Delivery Method): nasal cannula    PE  NAD  Awake, alert  Anicteric, MMM  NGT  LE edema  No rash grossly                        8.3    8.00  )-----------( 117      ( 06 Aug 2024 06:01 )             27.5       08-06    145  |  112<H>  |  19  ----------------------------<  112<H>  3.3<L>   |  23  |  0.72    Ca    7.8<L>      06 Aug 2024 06:01  Phos  1.8     08-06  Mg     1.5     08-06    TPro  4.5<L>  /  Alb  2.3<L>  /  TBili  0.7  /  DBili  x   /  AST  30  /  ALT  16  /  AlkPhos  102  08-05

## 2024-08-06 NOTE — SWALLOW VFSS/MBS ASSESSMENT ADULT - SLP GENERAL OBSERVATIONS
Pt encountered in bed, RA, NAD, + icu monitoring (VSS). Baseline congested cough noted w/ removal of thick yellow tinged secretions as per RN Edita. Pt Aox2 and able to follow directives, however noted to be irritable. Pt encountered in radiology, secure in PASQUALE chair. Pt awake and alert, with eyes closed for the majority of the exam. Pt generally cooperative, inconsistently following directions for the purposes of the exam, became impatient toward the end of the exam, requiring encouragement to complete.

## 2024-08-06 NOTE — PROGRESS NOTE ADULT - PROBLEM SELECTOR PLAN 3
Patient with chronic afib unable to be on AC for this and seemingly minimally responsive to amiodarone. Cardiology following, no obvious underlying cause aside from hemorrhage, which is difficult to control. Most recent digoxin trough low at 0.7.  - Continue digoxin per cardiology  - Digoxin 125 EOD  - Holding eliquis

## 2024-08-06 NOTE — SWALLOW VFSS/MBS ASSESSMENT ADULT - THE ABOVE FINDINGS WERE DISCUSSED WITH
Dr. Jodie Okeefe/Nursing Dr. Jodie Okeefe; Pt's wife and daughter via telephone/Nursing/Patient/Family

## 2024-08-06 NOTE — SWALLOW VFSS/MBS ASSESSMENT ADULT - ORAL PREP COMMENTS
Pt with impulsivity with cup drinking, when asked to take a very small sip of mildly thick liquids was noted to take the majority of the liquid in the cup

## 2024-08-06 NOTE — PROGRESS NOTE ADULT - PROBLEM SELECTOR PLAN 5
Patient with CAD with multiple stents done as recently as 6/2024 but unable to be on DAPT due to persistent GIB.  - At risk for stent closure, but difficult to manage due to persistent GIB and recent hemorrhagic shock with multiple events requiring frequent transfusions in just the past several weeks.  - Continue to hold anti-platelet agents

## 2024-08-06 NOTE — SWALLOW VFSS/MBS ASSESSMENT ADULT - MODE OF PRESENTATION
spoon/fed by clinician spoon spoon/self fed/fed by clinician cup cup/spoon/self fed/fed by clinician

## 2024-08-06 NOTE — PROGRESS NOTE ADULT - PROBLEM SELECTOR PLAN 1
Patient arrive to ED with parents for cough and runny nose x 2 days, patient is currently being treated for ear infection Patient prolonged hospitalization and now with fevers and tachycardia. Poor cough especially in the setting of repeated intubations and extubations last extubated on 7/28. Not feeling SOB, but will get imaging, cultures, and start abx. UCx 8/2 showing gram negative rods. Patient denying dysuria.  - Not multi-drug resistant pseudomonas in last sputum culture, so will start with one agent for now.   - C/w cefepime 2g q8 for 7d (started 8/2)  - F/u UCx and sensitivities  - F/u Bcx 8/2; NGTD

## 2024-08-06 NOTE — PROGRESS NOTE ADULT - ASSESSMENT
79 yo male with PMH of CAD s/p PCI x3 with most recent stent 6/12/24 on Plavix, chronic Afib on eliquis, orthostatic hypotension on midodrine, PAD, neuroendocrine tumor with RT currently on hold, recent admission for dx cath on 6/24/24 who presented for hypotension, admitted for GIB and hemorrhagic shock. Found to be bleeding from duodenum. Transferred to MICU, on pressors.     1. Pancreatic NET- metastatic   -- was on lanreotide then had POD in liver and started on peptide receptor radiotherapy (PRRT)- typically given every 8 weeks for 4 doses. He received one dose on 10/20/2023 and planned to get his 2nd dose at the end of December however his course was complicated by multiple hospitalizations that were noncancer related (decompensated heart failure).   -- 5/28/24 PET Dotatate (compared to 9/2023): several new somatostatin avid osseous lesions, some faintly sclerotic, suspicious for mets. Increased upper RP nodes, probably metastatic. Decreased intensely tracer avid right supradiaphragmatic and upper abdominal nodes, suspicious for metastasis. Redemonstrated intensely somatostatin avid bilobar hepatic lesions, consistent with metastases again morphologically difficult to delineate.   -- CT reviewed by [facility]: SINCE MAY 8 2024 INCREASED HEPATIC METASTASES, NEWLY SEEN PRIMARY TUMOR IN THE PANCREAS, and active bleeding within the duodenum with associated intraluminal hematoma.   -- chromogranin level is elevated at 2058, but no prior level at [facility] for review   -- f/u with Dr. Sophia Prasad at [facility] after discharge, no plan for chemotherapy inpatient, if clinically improves/stabilizes then can be considered for treatment outpatient  --Continue octreotide 250mcg TID    2. Duodenal bleed, Hemorrhagic shock  - Now transferred to medicine from MICU, s/p extubation 7/22. NG tube in place.  - CT w/ bleed in duodenum. GI called, EGD 7/9 -> S/p  IR embolization 7/9, intubated in MICU -> s/p extubation 7/15 -> intubated 7/18  - s/p multiple transfusions, fibrinogen low would recommend Cryo for < 100, but coags have improved as are essentially normal now so unlikely, probably was related to liver dysfunction.   - s/p repeat EGD 7/12 showing duodenal lesions w/clots and oozing, no clear targets for intervention and no active bleeding, purastat applied to all areas of oozing.   - S/p EGD 7/18: duodenal ulcer with active oozing, ulcer with visible vessel. Bleeding treated.   - s/p multiple transfusions, per GI, high risk for rebleed, will continue PPI.   - Repeat CT AP w/ No evidence of GI bleed.  Interval endoscopic versus surgical intervention with metallic streak artifact suggestive of surgical clips in the region of the proximal one third portions of the duodenum. Evaluation of the previously seen hematoma is limited secondary to this artifact. Moderate bilateral pleural effusions and associated compressive atelectasis, right greater than left, overall slightly increased from previous CT. Anasarca.  - Per IR, In the absences of any active extravasation on CTA there's no place to target for an embolization  - 7/29 sputum culture + Pseudomonas; RRT on 8/2 due to tachycardia, hypotension; febrile to 101. Currently on cefepime x 7 days  - Continue octreotide 250 mcg TID  - CT AP 8/4: No evidence of active intraluminal extravasation of contrast. No evidence of acute intraperitoneal or retroperitoneal hemorrhage.  - Per surgery, not planning any surgical intervention  - Barium swallow today if patient is agreeable    3. C. diff   - diarrhea resolved, off vanco    Will continue to follow.    Hugo Topete PA-C  Hematology/Oncology  [facility]  [phone] (office)

## 2024-08-06 NOTE — PROGRESS NOTE ADULT - SUBJECTIVE AND OBJECTIVE BOX
DATE OF SERVICE: 08-06-24 @ 14:41    Patient is a 79y old  Male who presents with a chief complaint of hypotension (06 Aug 2024 11:45)      INTERVAL HISTORY: In no acute distress. Planned for MBS today.     REVIEW OF SYSTEMS:  CONSTITUTIONAL: No weakness  EYES/ENT: No visual changes;  No throat pain   NECK: No pain or stiffness  RESPIRATORY: No cough, wheezing; No shortness of breath  CARDIOVASCULAR: No chest pain or palpitations  GASTROINTESTINAL: No abdominal  pain. No nausea, vomiting, or hematemesis  GENITOURINARY: No dysuria, frequency or hematuria  NEUROLOGICAL: No stroke like symptoms  SKIN: No rashes    TELEMETRY Personally reviewed: AF 70-90 with 8/14 beats of WCT  	  MEDICATIONS:  digoxin     Tablet 125 MICROGram(s) Oral every other day  metoprolol tartrate 12.5 milliGRAM(s) Oral two times a day  midodrine 10 milliGRAM(s) Oral every 8 hours        PHYSICAL EXAM:  T(C): 36.6 (08-06-24 @ 12:00), Max: 36.7 (08-05-24 @ 16:09)  HR: 86 (08-06-24 @ 13:04) (84 - 96)  BP: 131/74 (08-06-24 @ 13:04) (95/60 - 131/74)  RR: 20 (08-06-24 @ 13:04) (18 - 20)  SpO2: 100% (08-06-24 @ 13:04) (96% - 100%)  Wt(kg): --  I&O's Summary    05 Aug 2024 07:01  -  06 Aug 2024 07:00  --------------------------------------------------------  IN: 0 mL / OUT: 500 mL / NET: -500 mL          Appearance: In no distress	  HEENT:    PERRL, EOMI	  Cardiovascular:  S1 S2, No JVD  Respiratory: Lungs clear to auscultation	  Gastrointestinal:  Soft, Non-tender, + BS	  Vascularature:  + edema of LE  Psychiatric: Appropriate affect   Neuro: no acute focal deficits                               8.3    8.00  )-----------( 117      ( 06 Aug 2024 06:01 )             27.5     08-06    145  |  112<H>  |  19  ----------------------------<  112<H>  3.3<L>   |  23  |  0.72    Ca    7.8<L>      06 Aug 2024 06:01  Phos  1.8     08-06  Mg     1.5     08-06    TPro  4.5<L>  /  Alb  2.3<L>  /  TBili  0.7  /  DBili  x   /  AST  30  /  ALT  16  /  AlkPhos  102  08-05        Labs personally reviewed      ASSESSMENT/PLAN: 	    78-year-old male multiple medical problems history of hepatocellular carcinoma status post radiation therapy, AF s/p DCCV on Eliquis who was found to be hypotensive following NST. Patient has reported general weakness, fatigue, lightheadedness since being discharged from hospital. Reports mild chest discomfort in midsternal region. Reports dyspnea with minimal exertion. Also reports significant lack of appetite and poor PO intake. No dark or bloody stool, nausea or vomiting.       Problem/Plan - 1:  ·  Problem: Hypotension  ·  Plan: s/p pressors in MICU.  Now transferred to [facility]  - Patient presents with hypotension and also RAZIA. Has known orthostatic hypotension.   - Patient and wife report decreased PO intake likely 2/2 malignancy.  - GIB 7/9, s/p multiple units prbc, IR for mesenteric embolization; Repeat CT A/P with no extravazation  - c/w Midodrine 10mg PO [id number]     Problem/Plan - 2:  ·  Problem: CAD.   ·  Plan: Recent PCI to pLAD in June 2023  - ECG non-ischemic  - Plavix held given large melena; ideally should be on Plavix but high risk to resume      Problem/Plan - 3:  ·  Problem: Atrial Fibrillation  - s/p succesful DCCV 10/31  - Hold Eliquis 5mg BID given GIB  - s/p Amio  - However would advise against chemical cardioversion off AC if possible  - c/w Dig 125mcg PO QOD  - Metoprolol 12.5mg BID via NGT initiated--> HR and BP improving with rate control        Sulma Espinosa, ROSIO-NP   Hank Hayes,  [facility]  Cardiovascular Medicine  [address], [address]  Available through call or text on [employer]  Office: [phone]

## 2024-08-06 NOTE — SWALLOW VFSS/MBS ASSESSMENT ADULT - H & P REVIEW
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 Per chart, on 7/8 PM, pt was noted to have acute onset of melena x5 and coffee ground emesis x2-3. RRT was called on 7/9 AM for hypotension, hgb dropped from 9.2 to 7.4 (admission baseline 10-11), FOBT +, pt was started on PPI IV and transfused 1 unit of pRBC. GI was consulted and underwent EGD on 7/9 afternoon. MICU was consulted for uncontrolled bleeding during EGD. During EGD, pt became hypotensive and was given phenylephrine, had A-fib with RVR s/p amiodarone. Now s/p IR GDA embolization, admitted to MICU for further moniring i.s.o hemorrhagic shock 2/2 GI bleed. On repeat EGD, oozing but no active bleeding was noted. Hospital course c/b c.diff, and patient has been placed on vancomycin. Patient had melanotic stool 7/18 with Hgb drop 10.1 to 7.1 and received 2 units pRBC and 1 unit platelets. Patient was reintubated and underwent EGD 7/18 afternoon. GI found duodenal bleed. Patient now s/p 3x clips and epi. Since 7/18 patient has continued to have melanotic stools with hemoglobin drops concerning for slow rebleeding. Extubated 7/22./yes

## 2024-08-06 NOTE — SWALLOW VFSS/MBS ASSESSMENT ADULT - RECOMMENDED CONSISTENCY
Pt remains at increased risk of aspiration with oral feeding. However, in setting of overall clinical picture of metastatic pancreatic NET with persistent GIB during this hospital course, initiation of a conservative oral diet with strategies may be most appropriate course for overall Pt safety. The following diet may help to minimize but will not eliminate aspiration risk.     If in keeping with Pt/family wishes, the following diet and strategies may reduce degree of aspiration risk:    Puree / Mildly thick liquids VIA TEASPOON, SWALLOW x2 PER BOLUS  Intermittent throat clears/coughs Pt remains at increased risk of aspiration with oral feeding. However, in setting of overall clinical picture of metastatic pancreatic NET with persistent GIB during this hospital course, initiation of a conservative oral diet with strategies may be most appropriate course for overall Pt safety.     If in keeping with Pt/family wishes, the following diet and strategies may help to minimize, but will not eliminate aspiration risk:    Puree / Mildly thick liquids VIA TEASPOON, SWALLOW x2 PER BOLUS  Intermittent throat clears/coughs

## 2024-08-06 NOTE — SWALLOW VFSS/MBS ASSESSMENT ADULT - DELAYED INITIATION OF THE PHARYNGEAL SWALLOW (IN SECONDS)
pharyngeal swallow initiation with bolus in the valleculae Swallow trigger with bolus in the pyriforms swallow trigger with bolus in the valleculae swallow trigger with bolus in the valleculae, occasionally in pyriforms

## 2024-08-06 NOTE — SWALLOW VFSS/MBS ASSESSMENT ADULT - NS SWALLOW VFSS REC ASPIR MON
Monitor for s/s aspiration/laryngeal penetration. If noted:  D/C p.o. intake, provide non-oral nutrition/hydration/meds, and contact this service @ [phone]/change of breathing pattern/gurgly voice/fever/pneumonia/throat clearing/upper respiratory infection

## 2024-08-06 NOTE — SWALLOW VFSS/MBS ASSESSMENT ADULT - LARYNGEAL PENETRATION AFTER THE SWALLOW - SILENT
shallow, does not appear to descend; appears to clear with cued repeat swallows/Trace intermittently descends to the vocal folds; eventually mostly clears with repeated swallows/Trace appears to occasionally descend deep in the laryngeal vestibule, but appeared to mostly clear with repeat swallows/Trace Trace

## 2024-08-06 NOTE — CHART NOTE - NSCHARTNOTEFT_GEN_A_CORE
NUTRITION FOLLOW UP NOTE    PATIENT SEEN FOR: Malnutrition Follow Up; NPO >/= 4 days    SOURCE: [] Patient  [x] Current Medical Record  [X] RN  [] Family/support person at bedside  [X] Patient unavailable/inappropriate  [] Other:    CHART REVIEWED/EVENTS NOTED.  [] No changes to nutrition care plan to note  [X] Nutrition Status:  - Mets neuroendocrine pancreatic cancer.   - FTT in setting of metastatic disease, repeated intubation and difficulty swallowing. Speech language pathologist following; Plan for repeat MBS today () pending pt continues to be amenable. Pt currently NPO with NG tube due to aspiration.   - Sepsis; ordered for abx regimen (started on ).   - RAZIA (now resolving).   - C diff infection (resolved).     DIET ORDER:   Diet, NPO (24)      CURRENT DIET ORDER IS:  [] Appropriate:  [] Inadequate:   [X] Other: See recommendations below    NUTRITION INTAKE/PROVISION:  [X] PO:  - Pt previously receiving EN feeds via NGT with Vital 1.5 @ 65 mL/hr x 18 hrs with ProSource TF Free 1x/day. .   - Noted pt pulled out NG tube on , tube replaced for medication administration. Pt currently NPO, not receiving EN feeds x 7 days.   [] Enteral Nutrition:  [] Parenteral Nutrition:    ANTHROPOMETRICS:  Drug Dosing Weight  Height (cm): 188 (2024 13:27)  Weight (kg): 125.6 (2024 00:00); Previously 98.7 kg (7/3)  BMI (kg/m2): 35.5 (2024 00:00)    Weights:  Daily Weights in k.8 (), 121.6 ( ), 124.6 ( ), 129.4 (), 140.3 (), 125.6 (), 120.1 (). Noted upward wt trend likely secondary to fluid retention (noted pt with severe edema, undergoing diuresis throughout admission). RD will continue to monitor wt trends as wts become available/able.     NUTRITIONALLY PERTINENT MEDICATIONS:  MEDICATIONS  (STANDING):  cefepime   IVPB  cefepime   IVPB  dextrose 5% + sodium chloride 0.45%.  dextrose 50% Injectable  digoxin     Tablet  folic acid Injectable  insulin lispro (ADMELOG) corrective regimen sliding scale  metoprolol tartrate  midodrine  octreotide  Injectable  pantoprazole  Injectable  potassium chloride   Powder  potassium phosphate IVPB  thiamine Injectable       NUTRITIONALLY PERTINENT LABS:   Na145 mmol/L Glu 112 mg/dL<H> K+ 3.3 mmol/L<L> Cr  0.72 mg/dL BUN 19 mg/dL  Phos 1.8 mg/dL<L>  Alb 2.3 g/dL<L> ALT 16 U/L AST 30 U/L Alkaline Phosphatase 102 U/L    A1C with Estimated Average Glucose Result: 6.4 % (07-15-24 @ 09:38)    Finger Sticks:  POCT Blood Glucose.: 110 mg/dL ( @ 05:16)  POCT Blood Glucose.: 117 mg/dL ( @ 23:48)  POCT Blood Glucose.: 160 mg/dL ( @ 18:23)  POCT Blood Glucose.: 147 mg/dL ( @ 11:58)      NUTRITIONALLY PERTINENT MEDICATIONS/LABS:  [x] Reviewed  [x] Relevant notes on medications/labs:  - POCTs x 24 hrs WNL. Ordered for SSI.   - Previously trending hypernatremia, Na WNL today ().   - Hypokalemia s/p repletion, hypophosphatemia s/p repletion and hypomagnesemia today (). **PT AT HIGH RISK FOR REFEEDING SYNDROME given prolonged period of inadequate PO intake/EN provision and electrolyte abnormality. Trend K+, Mg, Phos and replete as needed**  - IVF: D5% + 1/2 NS @ 100 mL/hr.   - Ordered for thiamine, folic acid supplementation.   - Ordered for abx, octreotide.     EDEMA:  [x] Reviewed  [X] Relevant notes: +3 L/R arm, +4 L/R leg, L/R ankle, L/R foot edema noted per nursing documentation.    GI/ I&O:  [x] Reviewed  [x] Relevant notes: Noted black, tarry stools over the weekend. Last BM 8/3. No bowel regimen.   [X] Other: Ordered for PPI.    SKIN:   [] No pressure injuries documented, per nursing flowsheet  [X] Pressure injury previously noted: Suspected deep tissue injury Right medial back per nursing documentation.  [] Change in pressure injury documentation:  [] Other:    ESTIMATED NEEDS:  [X] No change:  [] Updated:  Energy:  2270 - 2764 kcal/day (23 - 28 kcal/kg)  Protein:  118 - 138 g/day (1.2 - 1.4 g/kg)  Fluid:   ml/day or [X] defer to team  Based on: previous dosing wt of 98.7 kg (7/3) in consideration of advanced age, pressure injuries.     NUTRITION DIAGNOSIS:  [X] Prior Dx: (1) Severe Chronic Malnutrition (2) Increased Nutrient Needs  [] New Dx:    EDUCATION:  [] Yes:  [x] Not appropriate/warranted    NUTRITION CARE PLAN:  1. Medical team to advance diet when medically feasible. Consider alternate means of nutrition if within pt/family GOC given pt NPO status >/= 7 days.  2. If EN warranted, in consideration of altered GI function, consider initiating EN feeds via NG tube: Vital 1.5 @ 15 mL/hr and titrate upwards by 10 mL q24hrs as pt tolerate/lytes WNL to goal rate of 65 mL/hr x 24 hrs.  EN regimen with modular components provide: 1560 mL total volume, 2430 kcals, 120 gm protein, and 1192 mL free water. Meets 25 kcals/kG and 1.2 gm protein/kG based on previous dosing wt 98.7 kG. Defer free water flushes to team.          [X] Add ProSource TF Free 1x/day (provides 90 kcal, 15 gm protein) via NG tube.          [X] **Given REFEEDING RISK secondary to prolonged inadequate energy intake; trend K+, Mg, Phos and replete as needed. **  3. If PO diet warranted, consider regular diet free of therapeutic restrictions to optimize PO intake; defer diet texture/consistency to speech language pathologist.         [X] RD to add Daily Smoothie 2x/day, per pt's previous oral nutrition supplementation preference.   2. Continue with thiamine and folic acid supplementation for refeeding risk; consider adding multivitamin for refeeding risk, pending no medical contraindications.   3. RD remains available upon request for TF formulary/rate adjustments prn.     MONITORING AND EVALUATION:   RD remains available upon request and will follow up per protocol.    Aida Rogel RD  Available on [facility] TEAMS NUTRITION FOLLOW UP NOTE    PATIENT SEEN FOR: Malnutrition Follow Up; NPO >/= 4 days    SOURCE: [] Patient  [x] Current Medical Record  [X] RN  [] Family/support person at bedside  [X] Patient unavailable/inappropriate  [] Other:    CHART REVIEWED/EVENTS NOTED.  [] No changes to nutrition care plan to note  [X] Nutrition Status:  - Mets neuroendocrine pancreatic cancer.   - FTT in setting of metastatic disease, repeated intubation and difficulty swallowing. Speech language pathologist following; Plan for repeat MBS today () pending pt continues to be amenable. Pt currently NPO with NG tube due to aspiration.   - Sepsis; ordered for abx regimen (started on ).   - RAZIA (now resolving).   - C diff infection (resolved).     DIET ORDER:   Diet, NPO (24)      CURRENT DIET ORDER IS:  [] Appropriate:  [] Inadequate:   [X] Other: See recommendations below    NUTRITION INTAKE/PROVISION:  [X] PO:  - Pt previously receiving EN feeds via NGT with Vital 1.5 @ 65 mL/hr x 18 hrs with ProSource TF Free 1x/day. .   - Noted pt pulled out NG tube on , tube replaced for medication administration. Pt currently NPO, not receiving EN feeds x 7 days.   [] Enteral Nutrition:  [] Parenteral Nutrition:    ANTHROPOMETRICS:  Drug Dosing Weight  Height (cm): 188 (2024 13:27)  Weight (kg): 125.6 (2024 00:00); Previously 98.7 kg (7/3)  BMI (kg/m2): 35.5 (2024 00:00)    Weights:  Daily Weights in k.8 (), 121.6 ( ), 124.6 ( ), 129.4 (), 140.3 (), 125.6 (), 120.1 (). Noted upward wt trend likely secondary to fluid retention (noted pt with severe edema, undergoing diuresis throughout admission). RD will continue to monitor wt trends as wts become available/able.     NUTRITIONALLY PERTINENT MEDICATIONS:  MEDICATIONS  (STANDING):  cefepime   IVPB  cefepime   IVPB  dextrose 5% + sodium chloride 0.45%.  dextrose 50% Injectable  digoxin     Tablet  folic acid Injectable  insulin lispro (ADMELOG) corrective regimen sliding scale  metoprolol tartrate  midodrine  octreotide  Injectable  pantoprazole  Injectable  potassium chloride   Powder  potassium phosphate IVPB  thiamine Injectable       NUTRITIONALLY PERTINENT LABS:   Na145 mmol/L Glu 112 mg/dL<H> K+ 3.3 mmol/L<L> Cr  0.72 mg/dL BUN 19 mg/dL  Phos 1.8 mg/dL<L>  Alb 2.3 g/dL<L> ALT 16 U/L AST 30 U/L Alkaline Phosphatase 102 U/L    A1C with Estimated Average Glucose Result: 6.4 % (07-15-24 @ 09:38)    Finger Sticks:  POCT Blood Glucose.: 110 mg/dL ( @ 05:16)  POCT Blood Glucose.: 117 mg/dL ( @ 23:48)  POCT Blood Glucose.: 160 mg/dL ( @ 18:23)  POCT Blood Glucose.: 147 mg/dL ( @ 11:58)      NUTRITIONALLY PERTINENT MEDICATIONS/LABS:  [x] Reviewed  [x] Relevant notes on medications/labs:  - POCTs x 24 hrs WNL. Ordered for SSI.   - Previously trending hypernatremia, Na WNL today ().   - Hypokalemia s/p repletion, hypophosphatemia s/p repletion and hypomagnesemia today (). **PT AT HIGH RISK FOR REFEEDING SYNDROME given prolonged period of inadequate PO intake/EN provision and electrolyte abnormality. Trend K+, Mg, Phos and replete as needed**  - IVF: D5% + 1/2 NS @ 100 mL/hr.   - Ordered for thiamine, folic acid supplementation.   - Ordered for abx, octreotide.     EDEMA:  [x] Reviewed  [X] Relevant notes: +3 L/R arm, +4 L/R leg, L/R ankle, L/R foot edema noted per nursing documentation.    GI/ I&O:  [x] Reviewed  [x] Relevant notes: Noted black, tarry stools over the weekend. Last BM 8/3. No bowel regimen.   [X] Other: Ordered for PPI.    SKIN:   [] No pressure injuries documented, per nursing flowsheet  [X] Pressure injury previously noted: Suspected deep tissue injury Right medial back per nursing documentation.  [] Change in pressure injury documentation:  [] Other:    ESTIMATED NEEDS:  [X] No change:  [] Updated:  Energy:  2270 - 2764 kcal/day (23 - 28 kcal/kg)  Protein:  118 - 138 g/day (1.2 - 1.4 g/kg)  Fluid:   ml/day or [X] defer to team  Based on: previous dosing wt of 98.7 kg (7/3) in consideration of advanced age, pressure injuries.     NUTRITION DIAGNOSIS:  [X] Prior Dx: (1) Severe Chronic Malnutrition (2) Increased Nutrient Needs  [] New Dx:    EDUCATION:  [] Yes:  [x] Not appropriate/warranted    NUTRITION CARE PLAN:  1. Medical team to advance diet when medically feasible. Consider alternate means of nutrition if within pt/family GOC given pt NPO status >/= 7 days.  2. If EN warranted, in consideration of altered GI function, consider initiating EN feeds via NG tube: Vital 1.5 @ 15 mL/hr and titrate upwards by 10 mL q24hrs as pt tolerate/lytes WNL to goal rate of 65 mL/hr x 24 hrs.  EN regimen with modular components provide: 1560 mL total volume, 2430 kcals, 120 gm protein, and 1192 mL free water. Meets 25 kcals/kG and 1.2 gm protein/kG based on previous dosing wt 98.7 kG. Defer free water flushes to team.          [X] Add ProSource TF Free 1x/day (provides 90 kcal, 15 gm protein) via NG tube.          [X] **Given REFEEDING RISK secondary to prolonged inadequate energy intake; trend K+, Mg, Phos and replete as needed. **  3. If PO diet warranted, consider low fiber diet given altered GI function; defer diet texture/consistency to speech language pathologist.         [X] Add [employer] Glucose Support Protein Shake 2x/day.   2. Continue with thiamine and folic acid supplementation for refeeding risk; consider adding multivitamin for refeeding risk, pending no medical contraindications.   3. RD remains available upon request for TF formulary/rate adjustments prn.     MONITORING AND EVALUATION:   RD remains available upon request and will follow up per protocol.    Aida Rogel RD  Available on [facility] TEAMS NUTRITION FOLLOW UP NOTE    PATIENT SEEN FOR: Malnutrition Follow Up; NPO >/= 4 days    SOURCE: [] Patient  [x] Current Medical Record  [X] RN  [] Family/support person at bedside  [X] Patient unavailable/inappropriate  [] Other:    CHART REVIEWED/EVENTS NOTED.  [] No changes to nutrition care plan to note  [X] Nutrition Status:  - Mets neuroendocrine pancreatic cancer.   - FTT in setting of metastatic disease, repeated intubation and difficulty swallowing. Speech language pathologist following; Plan for repeat MBS today () pending pt continues to be amenable. Pt currently NPO with NG tube due to aspiration.   - Sepsis; ordered for abx regimen (started on ).   - RAZIA (now resolving).   - C diff infection (resolved).     DIET ORDER:   Diet, NPO (24)      CURRENT DIET ORDER IS:  [] Appropriate:  [] Inadequate:   [X] Other: See recommendations below    NUTRITION INTAKE/PROVISION:  [X] PO:  - Pt previously receiving EN feeds via NGT with Vital 1.5 @ 65 mL/hr x 18 hrs with ProSource TF Free 1x/day. .   - Noted pt pulled out NG tube on , tube replaced for medication administration. Pt currently NPO, not receiving EN feeds x 7 days.   [] Enteral Nutrition:  [] Parenteral Nutrition:    ANTHROPOMETRICS:  Drug Dosing Weight  Height (cm): 188 (2024 13:27)  Weight (kg): 125.6 (2024 00:00); Previously 98.7 kg (7/3)  BMI (kg/m2): 35.5 (2024 00:00)    Weights:  Daily Weights in k.8 (), 121.6 ( ), 124.6 ( ), 129.4 (), 140.3 (), 125.6 (), 120.1 (). Noted upward wt trend likely secondary to fluid retention (noted pt with severe edema, undergoing diuresis throughout admission). RD will continue to monitor wt trends as wts become available/able.     NUTRITIONALLY PERTINENT MEDICATIONS:  MEDICATIONS  (STANDING):  cefepime   IVPB  cefepime   IVPB  dextrose 5% + sodium chloride 0.45%.  dextrose 50% Injectable  digoxin     Tablet  folic acid Injectable  insulin lispro (ADMELOG) corrective regimen sliding scale  metoprolol tartrate  midodrine  octreotide  Injectable  pantoprazole  Injectable  potassium chloride   Powder  potassium phosphate IVPB  thiamine Injectable       NUTRITIONALLY PERTINENT LABS:   Na145 mmol/L Glu 112 mg/dL<H> K+ 3.3 mmol/L<L> Cr  0.72 mg/dL BUN 19 mg/dL  Phos 1.8 mg/dL<L>  Alb 2.3 g/dL<L> ALT 16 U/L AST 30 U/L Alkaline Phosphatase 102 U/L    A1C with Estimated Average Glucose Result: 6.4 % (07-15-24 @ 09:38)    Finger Sticks:  POCT Blood Glucose.: 110 mg/dL ( @ 05:16)  POCT Blood Glucose.: 117 mg/dL ( @ 23:48)  POCT Blood Glucose.: 160 mg/dL ( @ 18:23)  POCT Blood Glucose.: 147 mg/dL ( @ 11:58)      NUTRITIONALLY PERTINENT MEDICATIONS/LABS:  [x] Reviewed  [x] Relevant notes on medications/labs:  - POCTs x 24 hrs WNL. Ordered for SSI.   - Previously trending hypernatremia, Na WNL today ().   - Hypokalemia s/p repletion, hypophosphatemia s/p repletion and hypomagnesemia today (). **PT AT HIGH RISK FOR REFEEDING SYNDROME given prolonged period of inadequate PO intake/EN provision and electrolyte abnormality. Trend K+, Mg, Phos and replete as needed**  - IVF: D5% + 1/2 NS @ 100 mL/hr.   - Ordered for thiamine, folic acid supplementation.   - Ordered for abx, octreotide.     EDEMA:  [x] Reviewed  [X] Relevant notes: +3 L/R arm, +4 L/R leg, L/R ankle, L/R foot edema noted per nursing documentation.    GI/ I&O:  [x] Reviewed  [x] Relevant notes: Noted black, tarry stools over the weekend. Last BM 8/3. No bowel regimen.   [X] Other: Ordered for PPI.    SKIN:   [] No pressure injuries documented, per nursing flowsheet  [X] Pressure injury previously noted: Suspected deep tissue injury Right medial back per nursing documentation.  [] Change in pressure injury documentation:  [] Other:    ESTIMATED NEEDS:  [X] No change:  [] Updated:  Energy:  2270 - 2764 kcal/day (23 - 28 kcal/kg)  Protein:  118 - 138 g/day (1.2 - 1.4 g/kg)  Fluid:   ml/day or [X] defer to team  Based on: previous dosing wt of 98.7 kg (7/3) in consideration of advanced age, pressure injuries.     NUTRITION DIAGNOSIS:  [X] Prior Dx: (1) Severe Chronic Malnutrition (2) Increased Nutrient Needs  [] New Dx:    EDUCATION:  [] Yes:  [x] Not appropriate/warranted    NUTRITION CARE PLAN:  1. Medical team to advance diet when medically feasible. Consider alternate means of nutrition if within pt/family GOC given pt NPO status >/= 7 days.  2. If EN warranted, in consideration of altered GI function, consider initiating EN feeds via NG tube: Vital 1.5 @ 15 mL/hr and titrate upwards by 10 mL q24hrs as pt tolerate/lytes WNL to goal rate of 65 mL/hr x 24 hrs.  EN regimen with modular components provide: 1560 mL total volume, 2430 kcals, 120 gm protein, and 1192 mL free water. Meets 25 kcals/kG and 1.2 gm protein/kG based on previous dosing wt 98.7 kG. Defer free water flushes to team.          [X] Add ProSource TF Free 1x/day (provides 90 kcal, 15 gm protein) via NG tube.          [X] **Given REFEEDING RISK secondary to prolonged inadequate energy intake; trend K+, Mg, Phos and replete as needed. **  3.If PO diet warranted, consider advancing from clear liquid diet to low fiber diet given altered GI function; defer diet texture/consistency to speech language pathologist.         [X] Add Ensure Clear Protein Shake 3x/day and Promote Protein Shake 1x/day to optimize protein intake.    2. Continue with thiamine and folic acid supplementation for refeeding risk; consider adding multivitamin for refeeding risk, pending no medical contraindications.   3. RD remains available upon request for TF formulary/rate adjustments prn.     MONITORING AND EVALUATION:   RD remains available upon request and will follow up per protocol.    Aida Rogel RD  Available on [facility] TEAMS NUTRITION FOLLOW UP NOTE    PATIENT SEEN FOR: Malnutrition Follow Up; NPO >/= 4 days    SOURCE: [] Patient  [x] Current Medical Record  [X] RN  [] Family/support person at bedside  [X] Patient unavailable/inappropriate  [] Other:    CHART REVIEWED/EVENTS NOTED.  [] No changes to nutrition care plan to note  [X] Nutrition Status:  - Mets neuroendocrine pancreatic cancer.   - FTT in setting of metastatic disease, repeated intubation and difficulty swallowing. Speech language pathologist following; Plan for repeat MBS today () pending pt continues to be amenable. Pt currently NPO with NG tube due to aspiration.   - Sepsis; ordered for abx regimen (started on ).   - RAZIA (now resolving).   - C diff infection (resolved).     DIET ORDER:   Diet, NPO (24)      CURRENT DIET ORDER IS:  [] Appropriate:  [] Inadequate:   [X] Other: See recommendations below    NUTRITION INTAKE/PROVISION:  [X] PO:  - Pt previously receiving EN feeds via NGT with Vital 1.5 @ 65 mL/hr x 18 hrs with ProSource TF Free 1x/day. .   - Noted pt pulled out NG tube on , tube replaced for medication administration. Pt currently NPO, not receiving EN feeds x 7 days.   [] Enteral Nutrition:  [] Parenteral Nutrition:    ANTHROPOMETRICS:  Drug Dosing Weight  Height (cm): 188 (2024 13:27)  Weight (kg): 125.6 (2024 00:00); Previously 98.7 kg (7/3)  BMI (kg/m2): 35.5 (2024 00:00)    Weights:  Daily Weights in k.8 (), 121.6 ( ), 124.6 ( ), 129.4 (), 140.3 (), 125.6 (), 120.1 (). Noted upward wt trend likely secondary to fluid retention (noted pt with severe edema, undergoing diuresis throughout admission). RD will continue to monitor wt trends as wts become available/able.     NUTRITIONALLY PERTINENT MEDICATIONS:  MEDICATIONS  (STANDING):  cefepime   IVPB  cefepime   IVPB  dextrose 5% + sodium chloride 0.45%.  dextrose 50% Injectable  digoxin     Tablet  folic acid Injectable  insulin lispro (ADMELOG) corrective regimen sliding scale  metoprolol tartrate  midodrine  octreotide  Injectable  pantoprazole  Injectable  potassium chloride   Powder  potassium phosphate IVPB  thiamine Injectable       NUTRITIONALLY PERTINENT LABS:   Na145 mmol/L Glu 112 mg/dL<H> K+ 3.3 mmol/L<L> Cr  0.72 mg/dL BUN 19 mg/dL  Phos 1.8 mg/dL<L>  Alb 2.3 g/dL<L> ALT 16 U/L AST 30 U/L Alkaline Phosphatase 102 U/L    A1C with Estimated Average Glucose Result: 6.4 % (07-15-24 @ 09:38)    Finger Sticks:  POCT Blood Glucose.: 110 mg/dL ( @ 05:16)  POCT Blood Glucose.: 117 mg/dL ( @ 23:48)  POCT Blood Glucose.: 160 mg/dL ( @ 18:23)  POCT Blood Glucose.: 147 mg/dL ( @ 11:58)      NUTRITIONALLY PERTINENT MEDICATIONS/LABS:  [x] Reviewed  [x] Relevant notes on medications/labs:  - POCTs x 24 hrs WNL. Ordered for SSI.   - Previously trending hypernatremia, Na WNL today ().   - Hypokalemia s/p repletion, hypophosphatemia s/p repletion and hypomagnesemia today (). **PT AT HIGH RISK FOR REFEEDING SYNDROME given prolonged period of inadequate PO intake/EN provision and electrolyte abnormality. Trend K+, Mg, Phos and replete as needed**  - IVF: D5% + 1/2 NS @ 100 mL/hr.   - Ordered for thiamine, folic acid supplementation.   - Ordered for abx, octreotide.     EDEMA:  [x] Reviewed  [X] Relevant notes: +3 L/R arm, +4 L/R leg, L/R ankle, L/R foot edema noted per nursing documentation.    GI/ I&O:  [x] Reviewed  [x] Relevant notes: Noted black, tarry stools over the weekend. Last BM 8/3. No bowel regimen.   [X] Other: Ordered for PPI.    SKIN:   [] No pressure injuries documented, per nursing flowsheet  [X] Pressure injury previously noted: Suspected deep tissue injury Right medial back per nursing documentation.  [] Change in pressure injury documentation:  [] Other:    ESTIMATED NEEDS:  [X] No change:  [] Updated:  Energy:  2270 - 2764 kcal/day (23 - 28 kcal/kg)  Protein:  118 - 138 g/day (1.2 - 1.4 g/kg)  Fluid:   ml/day or [X] defer to team  Based on: previous dosing wt of 98.7 kg (7/3) in consideration of advanced age, pressure injuries.     NUTRITION DIAGNOSIS:  [X] Prior Dx: (1) Severe Chronic Malnutrition (2) Increased Nutrient Needs  [] New Dx:    EDUCATION:  [] Yes:  [x] Not appropriate/warranted    NUTRITION CARE PLAN:  1. Medical team to advance diet when medically feasible. Consider alternate means of nutrition if within pt/family GOC given pt NPO status >/= 7 days.  2. If EN warranted, in consideration of altered GI function, consider initiating EN feeds via NG tube: Vital 1.5 @ 15 mL/hr and titrate upwards by 10 mL q24hrs as pt tolerate/lytes WNL to goal rate of 65 mL/hr x 24 hrs.  EN regimen with modular components provide: 1560 mL total volume, 2430 kcals, 120 gm protein, and 1192 mL free water. Meets 25 kcals/kG and 1.2 gm protein/kG based on previous dosing wt 98.7 kG. Defer free water flushes to team.          [X] Add ProSource TF Free 1x/day (provides 90 kcal, 15 gm protein) via NG tube.          [X] **Given REFEEDING RISK secondary to prolonged inadequate energy intake; trend K+, Mg, Phos and replete as needed. **  3. If PO diet warranted, consider advancing from clear liquid diet to low fiber diet given altered GI function; defer diet texture/consistency to speech language pathologist.         [X] Add Ensure Clear Protein Shake 3x/day to optimize protein intake.    2. Continue with thiamine and folic acid supplementation for refeeding risk; consider adding multivitamin for refeeding risk, pending no medical contraindications.   3. RD remains available upon request for TF formulary/rate adjustments prn.     MONITORING AND EVALUATION:   RD remains available upon request and will follow up per protocol.    Aida Rogel RD  Available on [facility] TEAMS

## 2024-08-06 NOTE — SWALLOW VFSS/MBS ASSESSMENT ADULT - SLP PRECAUTIONS/LIMITATIONS: HEARING
September 15, 2021     Patient: Eleonora Tariq   YOB: 1960   Date of Visit: 9/15/2021       To Whom it May Concern:    Eleoonra Tariq is under my professional care  She was seen in my office on 9/15/2021  She may return to light duty, minimal to no use right hand and wrist      If you have any questions or concerns, please don't hesitate to call           Sincerely,          Flaco Conrad MD        CC: No Recipients within functional limits

## 2024-08-06 NOTE — PROGRESS NOTE ADULT - NS ATTEND AMEND GEN_ALL_CORE FT
continues to monitor H/H, Overall stable  pt recovering slowly,   I agree otherwise with the assessment and plan stated in the PA note

## 2024-08-06 NOTE — SWALLOW VFSS/MBS ASSESSMENT ADULT - DIAGNOSTIC IMPRESSIONS
Pt presents with evidence of an oropharyngeal dysphagia notable for impaired oral management with prolonged mastication, and delayed A-P transit, oral residue post swallow, significant post swallow pharyngeal retention with reduced awareness, requiring cues to initiate repeat swallow. Pt with trace laryngeal penetration during the swallow, and after the swallow from pharyngeal residue with purees and moderately thickened liquids via teaspoon, that eventually Pt presents with evidence of an oropharyngeal dysphagia notable for impaired oral management with prolonged mastication, and delayed A-P transit, oral residue post swallow, significant post swallow pharyngeal retention with reduced awareness, requiring cues to initiate repeat swallow. Pt with trace laryngeal penetration during the swallow, and after the swallow from pharyngeal residue with purees and moderately thick and mildly thick liquids via teaspoon, that occasionally descended deep in the laryngeal vestibule, but appeared to eventually clear with repeat swallows. Pt with impulsivity with cup drinking, when asked to take a very small sip of mildly thick liquids was noted to take the majority of the liquid in the cup, and demonstrated mild deep laryngeal penetration to the vocal folds, that appears to clear with reswallows. For teaspoon trials of thin liquids, Pt with trace silent aspiration during the swallow. Pt not a candidate for postural strategies due to inconsistent command following and poor ability to carryover. Pt benefits from consistent cues to use repeat swallows and alternation of textures to clear laryngeal penetration and reduce pharyngeal residue.     Disorders: reduced lingual strength/ROM/Rate of motion, reduced tongue to palate contact, reduced BOT to posterior pharyngeal wall contact, reduced anterior hyo-laryngeal excursion, reduced laryngeal closure, reduced pharyngeal contraction and stripping, reduced laryngeal sensation, reduced subglottic sensation.

## 2024-08-06 NOTE — PROGRESS NOTE ADULT - PROBLEM SELECTOR PLAN 2
Patient with persistent UGIB with coffee ground emesis and melanotic stools multiple times requiring IR embolization initially and then EGD with clipping for duodenal ulcer. Patient has been off of his aspirin and plavix for his very recent stent and Eliquis for his chronic afib since he has required so many repeat transfusions due to this active blood loss. The hemorrhagic shock seems to have temporarily resolved. Hgb seems to be stable as well. CTAP 8/4 shows no active bleeding.  - Hold all AC and anti-platelet agents  - Protonix BID per GI  - No acute surgical intervention at this time given CTAP findings  - Change CBC to q24 unless having active bleeding  - Transfuse Hgb <8  - Can consider iron chelation therapy given many transfusions  - Maintain T&S, CBCs

## 2024-08-06 NOTE — PROGRESS NOTE ADULT - SUBJECTIVE AND OBJECTIVE BOX
Patient is a 79y old  Male who presents with a chief complaint of hypotension (05 Aug 2024 04:26)    SUBJECTIVE / OVERNIGHT EVENTS:      MEDICATIONS  (STANDING):  albuterol/ipratropium for Nebulization 3 milliLiter(s) Nebulizer every 6 hours  cefepime   IVPB 2000 milliGRAM(s) IV Intermittent every 8 hours  cefepime   IVPB      dextrose 5% + sodium chloride 0.45%. 1000 milliLiter(s) (100 mL/Hr) IV Continuous <Continuous>  dextrose 50% Injectable 25 Gram(s) IV Push once  digoxin     Tablet 125 MICROGram(s) Oral every other day  folic acid Injectable 1 milliGRAM(s) IV Push daily  insulin lispro (ADMELOG) corrective regimen sliding scale   SubCutaneous every 6 hours  metoprolol tartrate 12.5 milliGRAM(s) Oral two times a day  midodrine 10 milliGRAM(s) Oral every 8 hours  octreotide  Injectable 250 MICROGram(s) IV Push three times a day  pantoprazole  Injectable 40 milliGRAM(s) IV Push every 12 hours  sodium chloride 3%  Inhalation 4 milliLiter(s) Inhalation every 12 hours  thiamine Injectable 100 milliGRAM(s) IV Push daily    MEDICATIONS  (PRN):      CAPILLARY BLOOD GLUCOSE      POCT Blood Glucose.: 154 mg/dL (05 Aug 2024 06:00)  POCT Blood Glucose.: 121 mg/dL (05 Aug 2024 00:00)  POCT Blood Glucose.: 114 mg/dL (04 Aug 2024 21:42)  POCT Blood Glucose.: 113 mg/dL (04 Aug 2024 18:52)  POCT Blood Glucose.: 118 mg/dL (04 Aug 2024 11:42)  POCT Blood Glucose.: 125 mg/dL (04 Aug 2024 06:18)    I&O's Summary    04 Aug 2024 07:01  -  05 Aug 2024 06:12  --------------------------------------------------------  IN: 500 mL / OUT: 0 mL / NET: 500 mL        Vital Signs Last 24 Hrs  T(C): 36.9 (05 Aug 2024 04:03), Max: 36.9 (05 Aug 2024 04:03)  T(F): 98.5 (05 Aug 2024 04:03), Max: 98.5 (05 Aug 2024 04:03)  HR: 104 (05 Aug 2024 04:03) (90 - 104)  BP: 99/66 (05 Aug 2024 04:03) (98/63 - 102/68)  BP(mean): --  RR: 18 (05 Aug 2024 04:03) (18 - 18)  SpO2: 98% (05 Aug 2024 04:03) (97% - 100%)    Parameters below as of 05 Aug 2024 04:03  Patient On (Oxygen Delivery Method): nasal cannula  O2 Flow (L/min): 2      PHYSICAL EXAM:  GENERAL: NAD, uncomfortable, ill-appearing, NG tube in place, NC in place, coughing  HEAD: Atraumatic, Normocephalic  EYES: EOMI, PERRLA, conjunctiva and sclera clear  NECK: Supple, No JVD  CHEST/LUNG: Coarse lung sounds on left side, cleared after cough; No wheezes or crackles  HEART: Normal S1/S2; Irregular rhythm; No murmurs, rubs, or gallops  ABDOMEN: Soft, Nontender, Nondistended  EXTREMITIES: Profoundly edematous in all extremities   PSYCH: A&Ox1-2  NEUROLOGY: no focal neurologic deficit    LABS:                        8.4    8.49  )-----------( 109      ( 05 Aug 2024 04:32 )             26.0      08-04    150<H>  |  113<H>  |  26<H>  ----------------------------<  109<H>  3.8   |  24  |  0.83    Ca    7.9<L>      04 Aug 2024 10:23  Phos  2.3     08-04  Mg     1.6     08-04    TPro  4.6<L>  /  Alb  2.4<L>  /  TBili  0.8  /  DBili  x   /  AST  31  /  ALT  18  /  AlkPhos  113  08-04    PT/INR - ( 04 Aug 2024 10:23 )   PT: 12.8 sec;   INR: 1.23 ratio         PTT - ( 04 Aug 2024 10:23 )  PTT:26.9 sec      Urinalysis Basic - ( 04 Aug 2024 10:23 )    Color: x / Appearance: x / SG: x / pH: x  Gluc: 109 mg/dL / Ketone: x  / Bili: x / Urobili: x   Blood: x / Protein: x / Nitrite: x   Leuk Esterase: x / RBC: x / WBC x   Sq Epi: x / Non Sq Epi: x / Bacteria: x        RADIOLOGY & ADDITIONAL TESTS:    Imaging Personally Reviewed: [facility] 8/4    Consultant(s) Notes Reviewed: Surgery    Care Discussed with Consultants/Other Providers: Surgery   Patient is a 79y old  Male who presents with a chief complaint of hypotension (05 Aug 2024 04:26)    SUBJECTIVE / OVERNIGHT EVENTS:  no acute events overnight  wants to drink coca cola    MEDICATIONS  (STANDING):  albuterol/ipratropium for Nebulization 3 milliLiter(s) Nebulizer every 6 hours  cefepime   IVPB 2000 milliGRAM(s) IV Intermittent every 8 hours  cefepime   IVPB      dextrose 5% + sodium chloride 0.45%. 1000 milliLiter(s) (100 mL/Hr) IV Continuous <Continuous>  dextrose 50% Injectable 25 Gram(s) IV Push once  digoxin     Tablet 125 MICROGram(s) Oral every other day  folic acid Injectable 1 milliGRAM(s) IV Push daily  insulin lispro (ADMELOG) corrective regimen sliding scale   SubCutaneous every 6 hours  metoprolol tartrate 12.5 milliGRAM(s) Oral two times a day  midodrine 10 milliGRAM(s) Oral every 8 hours  octreotide  Injectable 250 MICROGram(s) IV Push three times a day  pantoprazole  Injectable 40 milliGRAM(s) IV Push every 12 hours  sodium chloride 3%  Inhalation 4 milliLiter(s) Inhalation every 12 hours  thiamine Injectable 100 milliGRAM(s) IV Push daily    MEDICATIONS  (PRN):      CAPILLARY BLOOD GLUCOSE      POCT Blood Glucose.: 154 mg/dL (05 Aug 2024 06:00)  POCT Blood Glucose.: 121 mg/dL (05 Aug 2024 00:00)  POCT Blood Glucose.: 114 mg/dL (04 Aug 2024 21:42)  POCT Blood Glucose.: 113 mg/dL (04 Aug 2024 18:52)  POCT Blood Glucose.: 118 mg/dL (04 Aug 2024 11:42)  POCT Blood Glucose.: 125 mg/dL (04 Aug 2024 06:18)    I&O's Summary    04 Aug 2024 07:01  -  05 Aug 2024 06:12  --------------------------------------------------------  IN: 500 mL / OUT: 0 mL / NET: 500 mL        Vital Signs Last 24 Hrs  T(C): 36.9 (05 Aug 2024 04:03), Max: 36.9 (05 Aug 2024 04:03)  T(F): 98.5 (05 Aug 2024 04:03), Max: 98.5 (05 Aug 2024 04:03)  HR: 104 (05 Aug 2024 04:03) (90 - 104)  BP: 99/66 (05 Aug 2024 04:03) (98/63 - 102/68)  BP(mean): --  RR: 18 (05 Aug 2024 04:03) (18 - 18)  SpO2: 98% (05 Aug 2024 04:03) (97% - 100%)    Parameters below as of 05 Aug 2024 04:03  Patient On (Oxygen Delivery Method): nasal cannula  O2 Flow (L/min): 2      PHYSICAL EXAM:  GENERAL: NAD, uncomfortable, ill-appearing, NG tube in place, NC in place, coughing  HEAD: Atraumatic, Normocephalic  EYES: EOMI, PERRLA, conjunctiva and sclera clear  NECK: Supple, No JVD  CHEST/LUNG: Coarse lung sounds on left side, cleared after cough; No wheezes or crackles  HEART: Normal S1/S2; Irregular rhythm; No murmurs, rubs, or gallops  ABDOMEN: Soft, Nontender, Nondistended  EXTREMITIES: Profoundly edematous in all extremities   PSYCH: A&Ox1-2  NEUROLOGY: no focal neurologic deficit    LABS:                        8.4    8.49  )-----------( 109      ( 05 Aug 2024 04:32 )             26.0      08-04    150<H>  |  113<H>  |  26<H>  ----------------------------<  109<H>  3.8   |  24  |  0.83    Ca    7.9<L>      04 Aug 2024 10:23  Phos  2.3     08-04  Mg     1.6     08-04    TPro  4.6<L>  /  Alb  2.4<L>  /  TBili  0.8  /  DBili  x   /  AST  31  /  ALT  18  /  AlkPhos  113  08-04    PT/INR - ( 04 Aug 2024 10:23 )   PT: 12.8 sec;   INR: 1.23 ratio         PTT - ( 04 Aug 2024 10:23 )  PTT:26.9 sec      Urinalysis Basic - ( 04 Aug 2024 10:23 )    Color: x / Appearance: x / SG: x / pH: x  Gluc: 109 mg/dL / Ketone: x  / Bili: x / Urobili: x   Blood: x / Protein: x / Nitrite: x   Leuk Esterase: x / RBC: x / WBC x   Sq Epi: x / Non Sq Epi: x / Bacteria: x        RADIOLOGY & ADDITIONAL TESTS:    Imaging Personally Reviewed: [facility] 8/4    Consultant(s) Notes Reviewed: Surgery    Care Discussed with Consultants/Other Providers: Surgery

## 2024-08-06 NOTE — SWALLOW VFSS/MBS ASSESSMENT ADULT - ORAL PHASE
Delayed oral transit time/Reduced anterior - posterior transport/Residue in oral cavity Delayed oral transit time/Residue in oral cavity Delayed oral transit time/Uncontrolled bolus / spillover in hypopharynx

## 2024-08-06 NOTE — SWALLOW VFSS/MBS ASSESSMENT ADULT - COMMENTS
*Pt actively being followed by this service. S/p FEES 7/17 w/ recommendations for NPO, with non-oral nutrition/hydration/medications. Reattempted FEES 7/29: Pt unable to tolerate scope. Unable to make recommendation at that time.

## 2024-08-06 NOTE — PROGRESS NOTE ADULT - PROBLEM SELECTOR PLAN 9
Resolved  Patient with C Diff diarrhea and required vanc (treated with 14 day course followed by prophylactic treatment while on meropenem)

## 2024-08-06 NOTE — SWALLOW VFSS/MBS ASSESSMENT ADULT - PHARYNGEAL PHASE COMMENTS
Pt benefits from cues for repeat swallows to facilitate clearance of pharyngeal residue Pt with reduced awareness  of pharyngeal residue, requiring cues to initiate repeat swallow reduced awareness of residue, requiring cues to repeat swallow

## 2024-08-06 NOTE — PROGRESS NOTE ADULT - PROBLEM SELECTOR PLAN 7
Patient with failure to thrive iso metastatic disease as well as repeated intubation and difficulty swallowing so pending barium swallow. Patient this afternoon (8/5) states willingness to try and family is in agreement.  - Barium Swallow scheduled for 1:30pm if patient continues to be amenable  - Currently NPO with NG tube due to aspiration risk, but can consider trickle tube feeds; d/c fluids if tube feeds restarted today

## 2024-08-07 NOTE — PROGRESS NOTE ADULT - PROBLEM SELECTOR PLAN 2
Patient with persistent UGIB with coffee ground emesis and melanotic stools multiple times requiring IR embolization initially and then EGD with clipping for duodenal ulcer. Patient has been off of his aspirin and plavix for his very recent stent and Eliquis for his chronic afib since he has required so many repeat transfusions due to this active blood loss. The hemorrhagic shock seems to have temporarily resolved. Hgb seems to be stable as well. CTAP 8/4 shows no active bleeding.  - Hold all AC and anti-platelet agents; per cardiology, defer to GI for bleeding risk; will ask GI today about anti-platelet challenge  - Protonix BID per GI  - No acute surgical intervention at this time given CTAP findings  - Continue CBC q24 unless having active bleeding  - Transfuse Hgb <8  - Can consider iron chelation therapy given many transfusions  - Maintain T&S Patient with persistent UGIB with coffee ground emesis and melanotic stools multiple times requiring IR embolization initially and then EGD with clipping for duodenal ulcer. Patient has been off of his aspirin and plavix for his very recent stent and Eliquis for his chronic afib since he has required so many repeat transfusions due to this active blood loss. The hemorrhagic shock seems to have temporarily resolved. Hgb seems to be stable as well. CTAP 8/4 shows no active bleeding.  - Hold all AC and anti-platelet agents  - Given stool today, CTA to assess for bleed per GI  - Protonix BID per GI  - No acute surgical intervention at this time given CTAP findings  - Continue CBC q24 unless having active bleeding  - Transfuse Hgb <8  - Can consider iron chelation therapy given many transfusions  - Maintain T&S

## 2024-08-07 NOTE — PROGRESS NOTE ADULT - PROBLEM SELECTOR PLAN 5
Patient with CAD with multiple stents done as recently as 6/2024 but unable to be on DAPT due to persistent GIB.  - At risk for stent closure, but difficult to manage due to persistent GIB and recent hemorrhagic shock with multiple events requiring frequent transfusions in just the past several weeks.  - Continue to hold anti-platelet agents to be discussed further with GI today Patient with CAD with multiple stents done as recently as 6/2024 but unable to be on DAPT due to persistent GIB.  - At risk for stent closure, but difficult to manage due to persistent GIB and recent hemorrhagic shock with multiple events requiring frequent transfusions in just the past several weeks.  - Continue to hold anti-platelet agent

## 2024-08-07 NOTE — PROGRESS NOTE ADULT - ATTENDING COMMENTS
Agree with above. Reported melena overnight, but H/H is stable. D/w Dr. De La O. If active melena, would repeat CTA to localize source as it's been a few weeks since last signs of bleeding. Continue PPI. Will follow.

## 2024-08-07 NOTE — PROGRESS NOTE ADULT - SUBJECTIVE AND OBJECTIVE BOX
DATE OF SERVICE: 08-07-24 @ 16:09    Patient is a 79y old  Male who presents with a chief complaint of hypotension (07 Aug 2024 12:41)      INTERVAL HISTORY: Feels ok.     REVIEW OF SYSTEMS:  CONSTITUTIONAL: No weakness  EYES/ENT: No visual changes;  No throat pain   NECK: No pain or stiffness  RESPIRATORY: No cough, wheezing; No shortness of breath  CARDIOVASCULAR: No chest pain or palpitations  GASTROINTESTINAL: No abdominal  pain. No nausea, vomiting, or hematemesis  GENITOURINARY: No dysuria, frequency or hematuria  NEUROLOGICAL: No stroke like symptoms  SKIN: No rashes    TELEMETRY Personally reviewed: AF 80-130s with 6 beat runs of WCT  	  MEDICATIONS:  digoxin     Tablet 125 MICROGram(s) Oral every other day  metoprolol tartrate 12.5 milliGRAM(s) Oral two times a day  midodrine 10 milliGRAM(s) Oral every 8 hours        PHYSICAL EXAM:  T(C): 36.4 (08-07-24 @ 15:50), Max: 36.8 (08-07-24 @ 08:00)  HR: 103 (08-07-24 @ 15:50) (88 - 109)  BP: 113/73 (08-07-24 @ 15:50) (95/53 - 113/73)  RR: 18 (08-07-24 @ 15:50) (18 - 18)  SpO2: 96% (08-07-24 @ 15:50) (95% - 100%)  Wt(kg): --  I&O's Summary    06 Aug 2024 07:01  -  07 Aug 2024 07:00  --------------------------------------------------------  IN: 900 mL / OUT: 100 mL / NET: 800 mL          Appearance: In no distress	  HEENT:    PERRL, EOMI	  Cardiovascular:  S1 S2, No JVD  Respiratory: Lungs clear to auscultation	  Gastrointestinal:  Soft, Non-tender, + BS	  Vascularature:  + edema of LE  Psychiatric: Appropriate affect   Neuro: no acute focal deficits                               8.6    8.99  )-----------( 111      ( 07 Aug 2024 10:44 )             28.3     08-07    147<H>  |  112<H>  |  21  ----------------------------<  126<H>  4.4   |  23  |  0.75    Ca    7.8<L>      07 Aug 2024 10:46  Phos  2.2     08-07  Mg     1.8     08-07          Labs personally reviewed      ASSESSMENT/PLAN: 	    78-year-old male multiple medical problems history of hepatocellular carcinoma status post radiation therapy, AF s/p DCCV on Eliquis who was found to be hypotensive following NST. Patient has reported general weakness, fatigue, lightheadedness since being discharged from hospital. Reports mild chest discomfort in midsternal region. Reports dyspnea with minimal exertion. Also reports significant lack of appetite and poor PO intake. No dark or bloody stool, nausea or vomiting.       Problem/Plan - 1:  ·  Problem: Hypotension  ·  Plan: s/p pressors in MICU.  Now transferred to [facility]  - Patient presents with hypotension and also RAZIA. Has known orthostatic hypotension.   - Patient and wife report decreased PO intake likely 2/2 malignancy.  - GIB 7/9, s/p multiple units prbc, IR for mesenteric embolization; Repeat CT A/P with no extravazation  - c/w Midodrine 10mg PO [id number]     Problem/Plan - 2:  ·  Problem: CAD.   ·  Plan: Recent PCI to pLAD in June 2023  - ECG non-ischemic  - Plavix held given large melena; ideally should be on Plavix but high risk to resume      Problem/Plan - 3:  ·  Problem: Atrial Fibrillation  - s/p succesful DCCV 10/31  - Hold Eliquis 5mg BID given GIB  - s/p Amio  - However would advise against chemical cardioversion off AC if possible  - c/w Dig 125mcg PO QOD  - Metoprolol 12.5mg BID       Sulma Espinosa, ROSIO-NP   Hank Hayes DO [facility]  Cardiovascular Medicine  [address], [place], [address]  Available through call or text on [employer]  Office: [phone]   DATE OF SERVICE: 08-07-24 @ 16:09    Patient is a 79y old  Male who presents with a chief complaint of hypotension (07 Aug 2024 12:41)      INTERVAL HISTORY: Feels ok.     REVIEW OF SYSTEMS:  CONSTITUTIONAL: No weakness  EYES/ENT: No visual changes;  No throat pain   NECK: No pain or stiffness  RESPIRATORY: No cough, wheezing; No shortness of breath  CARDIOVASCULAR: No chest pain or palpitations  GASTROINTESTINAL: No abdominal  pain. No nausea, vomiting, or hematemesis  GENITOURINARY: No dysuria, frequency or hematuria  NEUROLOGICAL: No stroke like symptoms  SKIN: No rashes    TELEMETRY Personally reviewed: AF 80-130s with 6 beat runs of WCT  	  MEDICATIONS:  digoxin     Tablet 125 MICROGram(s) Oral every other day  metoprolol tartrate 12.5 milliGRAM(s) Oral two times a day  midodrine 10 milliGRAM(s) Oral every 8 hours        PHYSICAL EXAM:  T(C): 36.4 (08-07-24 @ 15:50), Max: 36.8 (08-07-24 @ 08:00)  HR: 103 (08-07-24 @ 15:50) (88 - 109)  BP: 113/73 (08-07-24 @ 15:50) (95/53 - 113/73)  RR: 18 (08-07-24 @ 15:50) (18 - 18)  SpO2: 96% (08-07-24 @ 15:50) (95% - 100%)  Wt(kg): --  I&O's Summary    06 Aug 2024 07:01  -  07 Aug 2024 07:00  --------------------------------------------------------  IN: 900 mL / OUT: 100 mL / NET: 800 mL          Appearance: In no distress	  HEENT:    PERRL, EOMI	  Cardiovascular:  S1 S2, No JVD  Respiratory: Lungs clear to auscultation	  Gastrointestinal:  Soft, Non-tender, + BS	  Vascularature:  + edema of LE  Psychiatric: Appropriate affect   Neuro: no acute focal deficits                               8.6    8.99  )-----------( 111      ( 07 Aug 2024 10:44 )             28.3     08-07    147<H>  |  112<H>  |  21  ----------------------------<  126<H>  4.4   |  23  |  0.75    Ca    7.8<L>      07 Aug 2024 10:46  Phos  2.2     08-07  Mg     1.8     08-07          Labs personally reviewed      ASSESSMENT/PLAN: 	    78-year-old male multiple medical problems history of hepatocellular carcinoma status post radiation therapy, AF s/p DCCV on Eliquis who was found to be hypotensive following NST. Patient has reported general weakness, fatigue, lightheadedness since being discharged from hospital. Reports mild chest discomfort in midsternal region. Reports dyspnea with minimal exertion. Also reports significant lack of appetite and poor PO intake. No dark or bloody stool, nausea or vomiting.       Problem/Plan - 1:  ·  Problem: Hypotension  ·  Plan: s/p pressors in MICU.  Now transferred to [facility]  - Patient presents with hypotension and also RAZIA. Has known orthostatic hypotension.   - Patient and wife report decreased PO intake likely 2/2 malignancy.  - GIB 7/9, s/p multiple units prbc, IR for mesenteric embolization; Repeat CT A/P with no extravazation  - c/w Midodrine 10mg PO [id number]     Problem/Plan - 2:  ·  Problem: CAD.   ·  Plan: Recent PCI to pLAD in June 2023  - ECG non-ischemic  - Plavix held given large melena; ideally should be on Plavix but high risk to resume      Problem/Plan - 3:  ·  Problem: Atrial Fibrillation  - s/p succesful DCCV 10/31  - Hold Eliquis 5mg BID given GIB  - s/p Amio  - c/w Dig 125mcg PO QOD  - Metoprolol 12.5mg BID             ROSIO Latham-NP   Hank Hayes DO [facility]  Cardiovascular Medicine  [address], [address]  Available through call or text on [employer]  Office: [phone]

## 2024-08-07 NOTE — PROGRESS NOTE ADULT - PROBLEM SELECTOR PLAN 7
Patient with failure to thrive iso metastatic disease as well as repeated intubation and difficulty swallowing so pending barium swallow. Patient this afternoon (8/5) states willingness to try and family is in agreement.  MBS 8/6 showing no gross aspiration, but still high-risk. Recommended pureed diet with mildly thickened liquids and aspiration precautions given patient and family desire PO diet. NG tube removed 8/6 after study.  - Continue diet, to be supplemented with RD recs

## 2024-08-07 NOTE — PROGRESS NOTE ADULT - PROBLEM SELECTOR PLAN 4
Resolved  Patient initially with RAZIA in the setting of hypotension possibly related to the hemorrhagic shock but now recovering.

## 2024-08-07 NOTE — PROGRESS NOTE ADULT - SUBJECTIVE AND OBJECTIVE BOX
Patient is a 79y old  Male who presents with a chief complaint of hypotension (07 Aug 2024 11:18)      Interval Events:   Patient w/ downtrending Hgb for past few days, Bm this AM w/ large melanotic stool, GI reconsulted in the setting of concern for recurrence of GI bleeding.     ROS:   A 12-point ROS was performed and negative except as noted in HPI.    Hospital Medications:  cefepime   IVPB      cefepime   IVPB 2000 milliGRAM(s) IV Intermittent every 8 hours  dextrose 50% Injectable 25 Gram(s) IV Push once  digoxin     Tablet 125 MICROGram(s) Oral every other day  folic acid Injectable 1 milliGRAM(s) IV Push daily  insulin lispro (ADMELOG) corrective regimen sliding scale   SubCutaneous every 6 hours  metoprolol tartrate 12.5 milliGRAM(s) Oral two times a day  midodrine 10 milliGRAM(s) Oral every 8 hours  octreotide  Injectable 250 MICROGram(s) IV Push three times a day  pantoprazole  Injectable 40 milliGRAM(s) IV Push every 12 hours  sodium chloride 3%  Inhalation 4 milliLiter(s) Inhalation every 12 hours  thiamine Injectable 100 milliGRAM(s) IV Push daily      PHYSICAL EXAM:   Vital Signs:  Vital Signs Last 24 Hrs  T(C): 36.8 (07 Aug 2024 08:00), Max: 36.8 (07 Aug 2024 08:00)  T(F): 98.3 (07 Aug 2024 08:00), Max: 98.3 (07 Aug 2024 08:00)  HR: 90 (07 Aug 2024 08:00) (73 - 109)  BP: 96/66 (07 Aug 2024 08:00) (91/53 - 111/74)  BP(mean): --  RR: 18 (07 Aug 2024 08:00) (18 - 18)  SpO2: 95% (07 Aug 2024 08:00) (95% - 100%)    Parameters below as of 07 Aug 2024 08:00  Patient On (Oxygen Delivery Method): nasal cannula  O2 Flow (L/min): 3    Daily     Daily     GENERAL: no acute distress  NEURO: alert  HEENT: anicteric sclera, no conjunctival pallor appreciated  CHEST: no respiratory distress, no accessory muscle use  CARDIAC: regular rate  ABDOMEN: soft, nondistended, nontender, no rebound or guarding  EXTREMITIES: warm, well perfused, no edema  SKIN: no lesions noted    LABS: reviewed                        8.6    8.99  )-----------( 111      ( 07 Aug 2024 10:44 )             28.3     08-07    147<H>  |  112<H>  |  21  ----------------------------<  126<H>  4.4   |  23  |  0.75    Ca    7.8<L>      07 Aug 2024 10:46  Phos  2.2     08-07  Mg     1.8     08-07          Interval Diagnostic Studies: see sunrise for full report

## 2024-08-07 NOTE — PROGRESS NOTE ADULT - SUBJECTIVE AND OBJECTIVE BOX
Patient is a 79y old  Male who presents with a chief complaint of hypotension (07 Aug 2024 04:27)    Patient seen and examined at bedside, per chart review black tarry stool this morning.  MEDICATIONS  (STANDING):  cefepime   IVPB 2000 milliGRAM(s) IV Intermittent every 8 hours  cefepime   IVPB      dextrose 50% Injectable 25 Gram(s) IV Push once  digoxin     Tablet 125 MICROGram(s) Oral every other day  folic acid Injectable 1 milliGRAM(s) IV Push daily  insulin lispro (ADMELOG) corrective regimen sliding scale   SubCutaneous every 6 hours  metoprolol tartrate 12.5 milliGRAM(s) Oral two times a day  midodrine 10 milliGRAM(s) Oral every 8 hours  octreotide  Injectable 250 MICROGram(s) IV Push three times a day  pantoprazole  Injectable 40 milliGRAM(s) IV Push every 12 hours  sodium chloride 3%  Inhalation 4 milliLiter(s) Inhalation every 12 hours  thiamine Injectable 100 milliGRAM(s) IV Push daily    MEDICATIONS  (PRN):    Vital Signs Last 24 Hrs  T(C): 36.8 (07 Aug 2024 08:00), Max: 36.8 (07 Aug 2024 08:00)  T(F): 98.3 (07 Aug 2024 08:00), Max: 98.3 (07 Aug 2024 08:00)  HR: 90 (07 Aug 2024 08:00) (73 - 109)  BP: 96/66 (07 Aug 2024 08:00) (91/53 - 131/74)  BP(mean): 93 (06 Aug 2024 13:04) (93 - 93)  RR: 18 (07 Aug 2024 08:00) (18 - 20)  SpO2: 95% (07 Aug 2024 08:00) (95% - 100%)    Parameters below as of 07 Aug 2024 08:00  Patient On (Oxygen Delivery Method): nasal cannula  O2 Flow (L/min): 3    PE  NAD  Awake, alert  Anicteric, MMM  LE edema  No rash grossly                          8.6    8.99  )-----------( 111      ( 07 Aug 2024 10:44 )             28.3     08-06    145  |  112<H>  |  19  ----------------------------<  112<H>  3.3<L>   |  23  |  0.72    Ca    7.8<L>      06 Aug 2024 06:01  Phos  1.8     08-06  Mg     1.5     08-06    TPro  4.5<L>  /  Alb  2.3<L>  /  TBili  0.7  /  DBili  x   /  AST  30  /  ALT  16  /  AlkPhos  102  08-05

## 2024-08-07 NOTE — PROGRESS NOTE ADULT - SUBJECTIVE AND OBJECTIVE BOX
Patient is a 79y old  Male who presents with a chief complaint of hypotension (05 Aug 2024 04:26)    SUBJECTIVE / OVERNIGHT EVENTS:      MEDICATIONS  (STANDING):  albuterol/ipratropium for Nebulization 3 milliLiter(s) Nebulizer every 6 hours  cefepime   IVPB 2000 milliGRAM(s) IV Intermittent every 8 hours  cefepime   IVPB      dextrose 5% + sodium chloride 0.45%. 1000 milliLiter(s) (100 mL/Hr) IV Continuous <Continuous>  dextrose 50% Injectable 25 Gram(s) IV Push once  digoxin     Tablet 125 MICROGram(s) Oral every other day  folic acid Injectable 1 milliGRAM(s) IV Push daily  insulin lispro (ADMELOG) corrective regimen sliding scale   SubCutaneous every 6 hours  metoprolol tartrate 12.5 milliGRAM(s) Oral two times a day  midodrine 10 milliGRAM(s) Oral every 8 hours  octreotide  Injectable 250 MICROGram(s) IV Push three times a day  pantoprazole  Injectable 40 milliGRAM(s) IV Push every 12 hours  sodium chloride 3%  Inhalation 4 milliLiter(s) Inhalation every 12 hours  thiamine Injectable 100 milliGRAM(s) IV Push daily    MEDICATIONS  (PRN):      CAPILLARY BLOOD GLUCOSE      POCT Blood Glucose.: 154 mg/dL (05 Aug 2024 06:00)  POCT Blood Glucose.: 121 mg/dL (05 Aug 2024 00:00)  POCT Blood Glucose.: 114 mg/dL (04 Aug 2024 21:42)  POCT Blood Glucose.: 113 mg/dL (04 Aug 2024 18:52)  POCT Blood Glucose.: 118 mg/dL (04 Aug 2024 11:42)  POCT Blood Glucose.: 125 mg/dL (04 Aug 2024 06:18)    I&O's Summary    04 Aug 2024 07:01  -  05 Aug 2024 06:12  --------------------------------------------------------  IN: 500 mL / OUT: 0 mL / NET: 500 mL        Vital Signs Last 24 Hrs  T(C): 36.9 (05 Aug 2024 04:03), Max: 36.9 (05 Aug 2024 04:03)  T(F): 98.5 (05 Aug 2024 04:03), Max: 98.5 (05 Aug 2024 04:03)  HR: 104 (05 Aug 2024 04:03) (90 - 104)  BP: 99/66 (05 Aug 2024 04:03) (98/63 - 102/68)  BP(mean): --  RR: 18 (05 Aug 2024 04:03) (18 - 18)  SpO2: 98% (05 Aug 2024 04:03) (97% - 100%)    Parameters below as of 05 Aug 2024 04:03  Patient On (Oxygen Delivery Method): nasal cannula  O2 Flow (L/min): 2      PHYSICAL EXAM:  GENERAL: NAD, uncomfortable, ill-appearing, NG tube in place, NC in place, coughing  HEAD: Atraumatic, Normocephalic  EYES: EOMI, PERRLA, conjunctiva and sclera clear  NECK: Supple, No JVD  CHEST/LUNG: Coarse lung sounds on left side, cleared after cough; No wheezes or crackles  HEART: Normal S1/S2; Irregular rhythm; No murmurs, rubs, or gallops  ABDOMEN: Soft, Nontender, Nondistended  EXTREMITIES: Profoundly edematous in all extremities   PSYCH: A&Ox1-2  NEUROLOGY: no focal neurologic deficit    LABS:                        8.4    8.49  )-----------( 109      ( 05 Aug 2024 04:32 )             26.0      08-04    150<H>  |  113<H>  |  26<H>  ----------------------------<  109<H>  3.8   |  24  |  0.83    Ca    7.9<L>      04 Aug 2024 10:23  Phos  2.3     08-04  Mg     1.6     08-04    TPro  4.6<L>  /  Alb  2.4<L>  /  TBili  0.8  /  DBili  x   /  AST  31  /  ALT  18  /  AlkPhos  113  08-04    PT/INR - ( 04 Aug 2024 10:23 )   PT: 12.8 sec;   INR: 1.23 ratio         PTT - ( 04 Aug 2024 10:23 )  PTT:26.9 sec      Urinalysis Basic - ( 04 Aug 2024 10:23 )    Color: x / Appearance: x / SG: x / pH: x  Gluc: 109 mg/dL / Ketone: x  / Bili: x / Urobili: x   Blood: x / Protein: x / Nitrite: x   Leuk Esterase: x / RBC: x / WBC x   Sq Epi: x / Non Sq Epi: x / Bacteria: x     Patient is a 79y old  Male who presents with a chief complaint of hypotension (05 Aug 2024 04:26)    SUBJECTIVE / OVERNIGHT EVENTS:  Patient had a melanotic stool this morning. Hgb 8.6 and VSS. Patient endorses no complaints today. He is happy to have been able to start a PO diet.    MEDICATIONS  (STANDING):  albuterol/ipratropium for Nebulization 3 milliLiter(s) Nebulizer every 6 hours  cefepime   IVPB 2000 milliGRAM(s) IV Intermittent every 8 hours  cefepime   IVPB      dextrose 5% + sodium chloride 0.45%. 1000 milliLiter(s) (100 mL/Hr) IV Continuous <Continuous>  dextrose 50% Injectable 25 Gram(s) IV Push once  digoxin     Tablet 125 MICROGram(s) Oral every other day  folic acid Injectable 1 milliGRAM(s) IV Push daily  insulin lispro (ADMELOG) corrective regimen sliding scale   SubCutaneous every 6 hours  metoprolol tartrate 12.5 milliGRAM(s) Oral two times a day  midodrine 10 milliGRAM(s) Oral every 8 hours  octreotide  Injectable 250 MICROGram(s) IV Push three times a day  pantoprazole  Injectable 40 milliGRAM(s) IV Push every 12 hours  sodium chloride 3%  Inhalation 4 milliLiter(s) Inhalation every 12 hours  thiamine Injectable 100 milliGRAM(s) IV Push daily    MEDICATIONS  (PRN):      CAPILLARY BLOOD GLUCOSE      POCT Blood Glucose.: 154 mg/dL (05 Aug 2024 06:00)  POCT Blood Glucose.: 121 mg/dL (05 Aug 2024 00:00)  POCT Blood Glucose.: 114 mg/dL (04 Aug 2024 21:42)  POCT Blood Glucose.: 113 mg/dL (04 Aug 2024 18:52)  POCT Blood Glucose.: 118 mg/dL (04 Aug 2024 11:42)  POCT Blood Glucose.: 125 mg/dL (04 Aug 2024 06:18)    I&O's Summary    04 Aug 2024 07:01  -  05 Aug 2024 06:12  --------------------------------------------------------  IN: 500 mL / OUT: 0 mL / NET: 500 mL        Vital Signs Last 24 Hrs  T(C): 36.9 (05 Aug 2024 04:03), Max: 36.9 (05 Aug 2024 04:03)  T(F): 98.5 (05 Aug 2024 04:03), Max: 98.5 (05 Aug 2024 04:03)  HR: 104 (05 Aug 2024 04:03) (90 - 104)  BP: 99/66 (05 Aug 2024 04:03) (98/63 - 102/68)  BP(mean): --  RR: 18 (05 Aug 2024 04:03) (18 - 18)  SpO2: 98% (05 Aug 2024 04:03) (97% - 100%)    Parameters below as of 05 Aug 2024 04:03  Patient On (Oxygen Delivery Method): nasal cannula  O2 Flow (L/min): 2      PHYSICAL EXAM:  GENERAL: NAD, ill-appearing but comfortable, NC in place, coughing  HEAD: Atraumatic, Normocephalic  EYES: EOMI, PERRLA, conjunctiva and sclera clear  NECK: Supple  CHEST/LUNG: Clear to auscultation bilaterally, No wheezes or crackles  HEART: Normal S1/S2; Irregular rhythm; No murmurs, rubs, or gallops  ABDOMEN: Soft, Nontender, Nondistended  EXTREMITIES: Profound pitting edema in lower extremities until hips, edematous in proximal extremities  PSYCH: A&Ox1-2, improved mood  NEUROLOGY: no focal neurologic deficit    LABS:                        8.4    8.49  )-----------( 109      ( 05 Aug 2024 04:32 )             26.0      08-04    150<H>  |  113<H>  |  26<H>  ----------------------------<  109<H>  3.8   |  24  |  0.83    Ca    7.9<L>      04 Aug 2024 10:23  Phos  2.3     08-04  Mg     1.6     08-04    TPro  4.6<L>  /  Alb  2.4<L>  /  TBili  0.8  /  DBili  x   /  AST  31  /  ALT  18  /  AlkPhos  113  08-04    PT/INR - ( 04 Aug 2024 10:23 )   PT: 12.8 sec;   INR: 1.23 ratio         PTT - ( 04 Aug 2024 10:23 )  PTT:26.9 sec      Urinalysis Basic - ( 04 Aug 2024 10:23 )    Color: x / Appearance: x / SG: x / pH: x  Gluc: 109 mg/dL / Ketone: x  / Bili: x / Urobili: x   Blood: x / Protein: x / Nitrite: x   Leuk Esterase: x / RBC: x / WBC x   Sq Epi: x / Non Sq Epi: x / Bacteria: x

## 2024-08-07 NOTE — PROGRESS NOTE ADULT - ASSESSMENT
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, PAD, and orthostatic hypotension on midodrine presenting for lightheadedness He was admitted for initial complaint of lightheadedness and low bp despite midodrine, now with melena and coffee ground emesis and acute blood loss anemia, found to hemorrhagic shock due to duodenal bleed s/p GDA embolization and rebleed found to have duodenal ulcer with active oozing s/p clip, epi, and nexpowde.r     Impression:  #Hemorrhagic Shock  #Acute on chronic blood loss anemia  #Duodenal bleed s/p GDA embolization  #LA Grade B Esophagitis  #C-diff infection  #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 Found to have active bleed in second portion of duodenum, unable to identify source underneath large clot on EGD so underwent empiric embolization of GDA by IR for bleeding control. Hgb started dropping after starting cangrelor with worsening renal and liver failure. Repeat EGD 7/12 showed grade D esophagitis, organized clots with extensive duodenal ulcers (some with pigmented spots) w/o active bleeding, treated with purastat. Rebled 7/18 with Hgb drop two points in 9 hours s/p repeat EGD showing duodenal ulcer with active oozing and another ulcer with with visible vessel. 3 Clips and epi applied to oozing ulcer with hemostasis. Nexpowder applied to both ulcers for further hemostasis. No further bleeding since procedure.  Reconsulted 8/7 for large melanotic BM. CT on 8/4 w/o active bleeding noted.      Recommendations:  - Please obtain CTA to localize possible bleeding source   - Trend CBC, keep hgb > 8   - Hold plavix and eliquis if able  - Appreciate palliative care involvement given guarded prognosis  - Continue  PPI BID for 8 weeks for grade B esophagitis    Note incomplete until finalized by attending signature/attestation.    Jose Nj  GI/Hepatology Fellow, PGY-4    MONDAY-FRIDAY 8AM-5PM:  Please message via [employer] or email [email or web address]     On Weekends/Holidays (All day) and Weekdays after 5 PM to 8 AM  For nonurgent consults please email:  Please email [email or web address]  For urgent consults:  Please contact on call GI team. See Amion schedule ([facility]), [employer] paging system ([facility]), or call hospital  ([facility])

## 2024-08-07 NOTE — PROGRESS NOTE ADULT - PROBLEM SELECTOR PLAN 3
Patient with chronic afib unable to be on AC for this and seemingly minimally responsive to amiodarone. Cardiology following, no obvious underlying cause aside from hemorrhage, which is difficult to control. Most recent digoxin trough low at 0.7.  - Digoxin 125mcg every other day  - Continue current digoxin dose per cardiology  - Holding eliquis

## 2024-08-07 NOTE — PROGRESS NOTE ADULT - ATTENDING COMMENTS
78yo M pmh metastatic neuroendocrine pancreatic cancer, CAD s/p PCI x3 with stents placed on 6/12/24, chronic afib, orthostatic hypotension on midodrine, and PAD admitted 7/2 UGIB and hemorrhagic shock requiring MICU for pressors, course complicated by C Diff, afib RVR, and RAZIA iso hypotension, and sepsis due to UTI on abx guarded prognosis pending MBS and palliative reconsult.    Patient seen and evaluated on bedside rounds 8/7. Patient with large melatnotic stool in AM, at time of evaluation had another melanotic stool. No dizziness, BP stable, Heart rate relatively stable. Hb at time grossly stable. Respiraotry status stable, but labored when speaking  PE:  GEN: NAD  RESP: decreased BS at bases.  Unofficial POCUS: A-line on right anterior. Focal B-lines on left. Small simple pleural effusion on left.   Cards: +S1s2 rrr  ABD: mild distension. +BS  EXT: anasarca to sacrum.    1. sepsis due to UTI: Patient prolonged hospitalization and now with fevers and tachycardia. Poor cough especially in the setting of repeated intubations and extubations last extubated on 7/28. Not feeling SOB, but will get imaging, cultures, and start abx. UCx 8/2 showing gram negative rods. Patient denying dysuria.  - continue cefepime until 8/8 for HAP    2. Hemorrhagic shock: Patient with persistent UGIB with coffee ground emesis and melanotic stools multiple times requiring IR embolization initially and then EGD with clipping for duodenal ulcer. Patient has been off of his aspirin and plavix for his very recent stent and Eliquis for his chronic afib since he has required so many repeat transfusions due to this active blood loss.    - Hold all AC and anti-platelet agents  - Protonix BID per GI  -personally spoke to GI attending on 8/7, repeat CTA (had negative study on 8/4) given recurrent melena. If negative, will need to discuss further endoscopic evaluation vs. tagged RBC scan. Capsule may be less ideal given pancreatic NET given unknown strictures or intraluminal small bowel masses.   - Transfuse Hgb <8    3. Chronic atrial fibrillation: unable to be on AC for this and seemingly minimally responsive to amiodarone. Cardiology following, no obvious underlying cause aside from hemorrhage, which is difficult to control. Most recent digoxin trough low at 0.7.  - Continue digoxin per cardiology  - Digoxin 125 EOD  - Holding eliquis.    4. CAD: multiple stents done as recently as 6/2024 but unable to be on DAPT due to persistent GIB.  - At risk for stent closure, but difficult to manage due to persistent GIB and recent hemorrhagic shock with multiple events requiring frequent transfusions in just the past several weeks.  -f/u with cards/GI about trialing plavix.  Per report, prior trial of asa 81mg let to mass transfusion needs    5. Neuroendocrine carcinoma: Patient with known pancreatic neuroendocrine tumor and was on lanreotide. Was later on radiotherapy but due to multiple hospitalizations, was unable to receive follow-up doses. Patient did have PET done in May that showed lesions suspicious for mets. CT also in May showed increased hepatic metastases.   - Continue octreotide 250mcg TID.    6. GOC: currently NPO with NG tube.  Pending Barium Swallow scheduled for 1:30pm today if patient continues to be amenable.  Palliative reconsult to assist with conversations given multiple comorbididities and prolonged hospitalization.  Per patient he lives with wife, has no HHAs, prior to hospitalization walked ~1 block.  Gets most yennifer from seeing his grandkids on the weekends.    7. Anasarca  -patient anasarca, suspect combination of volume overload from transfusions and low albumin.   -limted diuresis 2/2 low BPs.  -once acute melanotic event resolves, may benefit from midodrine to maintain BP so can allow diuresis.     7. DM2: controlled on slide scale    8. cdiff: s/p oral vanc    contact: TEAMS .

## 2024-08-07 NOTE — PROGRESS NOTE ADULT - PROBLEM SELECTOR PLAN 10
DVT: unable to be on anything but SCDs  Diet: pureed diet, mildly thickened liquids  GOC: Full code, palliative to see patient today DVT: unable to be on anything but SCDs  Diet: pureed diet, mildly thickened liquids  GOC: Full code, palliative to see patient tomorrow, wife states that she would be able to make a meeting after 2pm, to confirm timing with palliative tomorrow

## 2024-08-07 NOTE — PROGRESS NOTE ADULT - PROBLEM SELECTOR PLAN 1
Patient prolonged hospitalization and now with fevers and tachycardia. Poor cough especially in the setting of repeated intubations and extubations last extubated on 7/28. Not feeling SOB, but will get imaging, cultures, and start abx. UCx 8/2 showing Klebsiella and sensitivity to cefepime. Patient denying dysuria. Recent blood cultures showing no growth.  - C/w cefepime 2g q8 for 7d (started 8/2)

## 2024-08-07 NOTE — PROGRESS NOTE ADULT - ASSESSMENT
79 yo male with PMH of CAD s/p PCI x3 with most recent stent 6/12/24 on Plavix, chronic Afib on eliquis, orthostatic hypotension on midodrine, PAD, neuroendocrine tumor with RT currently on hold, recent admission for dx cath on 6/24/24 who presented for hypotension, admitted for GIB and hemorrhagic shock. Found to be bleeding from duodenum. Transferred to MICU, on pressors.     1. Pancreatic NET- metastatic   -- was on lanreotide then had POD in liver and started on peptide receptor radiotherapy (PRRT)- typically given every 8 weeks for 4 doses. He received one dose on 10/20/2023 and planned to get his 2nd dose at the end of December however his course was complicated by multiple hospitalizations that were noncancer related (decompensated heart failure).   -- 5/28/24 PET Dotatate (compared to 9/2023): several new somatostatin avid osseous lesions, some faintly sclerotic, suspicious for mets. Increased upper RP nodes, probably metastatic. Decreased intensely tracer avid right supradiaphragmatic and upper abdominal nodes, suspicious for metastasis. Redemonstrated intensely somatostatin avid bilobar hepatic lesions, consistent with metastases again morphologically difficult to delineate.   -- CT reviewed by [facility]: SINCE MAY 8 2024 INCREASED HEPATIC METASTASES, NEWLY SEEN PRIMARY TUMOR IN THE PANCREAS, and active bleeding within the duodenum with associated intraluminal hematoma.   -- chromogranin level is elevated at 2058, but no prior level at [facility] for review   -- [place] [facility] [place], no plan for chemotherapy inpatient, if clinically improves/stabilizes then can be considered for treatment outpatient  --Continue octreotide 250mcg TID    2. Duodenal bleed, Hemorrhagic shock  - Now transferred to medicine from MICU, s/p extubation 7/22. NG tube in place.  - CT w/ bleed in duodenum. GI called, EGD 7/9 -> S/p  IR embolization 7/9, intubated in MICU -> s/p extubation 7/15 -> intubated 7/18  - s/p multiple transfusions, fibrinogen low would recommend Cryo for < 100, but coags have improved as are essentially normal now so unlikely, probably was related to liver dysfunction.   - s/p repeat EGD 7/12 showing duodenal lesions w/clots and oozing, no clear targets for intervention and no active bleeding, purastat applied to all areas of oozing.   - S/p EGD 7/18: duodenal ulcer with active oozing, ulcer with visible vessel. Bleeding treated.   - s/p multiple transfusions, per GI, high risk for rebleed, will continue PPI.   - Repeat CT AP w/ No evidence of GI bleed.  Interval endoscopic versus surgical intervention with metallic streak artifact suggestive of surgical clips in the region of the proximal one third portions of the duodenum. Evaluation of the previously seen hematoma is limited secondary to this artifact. Moderate bilateral pleural effusions and associated compressive atelectasis, right greater than left, overall slightly increased from previous CT. Anasarca.  - Per IR, In the absences of any active extravasation on CTA there's no place to target for an embolization  - 7/29 sputum culture + Pseudomonas; RRT on 8/2 due to tachycardia, hypotension; febrile to 101. Currently on cefepime x 7 days  - Continue octreotide 250 mcg TID  - CT AP 8/4: No evidence of active intraluminal extravasation of contrast. No evidence of acute intraperitoneal or retroperitoneal hemorrhage.  - Per surgery, not planning any surgical intervention  - Underwent swallow assessment, started mildly thick liquids    3. C. diff   - diarrhea resolved, off vanco    Will continue to follow.    Hugo Topete PA-C  Hematology/Oncology  [facility]  [phone] (office)

## 2024-08-08 NOTE — PROGRESS NOTE ADULT - PROBLEM SELECTOR PLAN 5
Patient with CAD with multiple stents done as recently as 6/2024 but unable to be on DAPT due to persistent GIB.  - At risk for stent closure, but difficult to manage due to persistent GIB and recent hemorrhagic shock with multiple events requiring frequent transfusions in just the past several weeks.  - Continue to hold anti-platelet agent

## 2024-08-08 NOTE — PROGRESS NOTE ADULT - NS ATTEND AMEND GEN_ALL_CORE FT
Agree with above assessment and plan.   Cdiff resolved. Pseudomonas in sputum currently on cefepime. Hemoglobin has been stable. Continue with octreotide. [place] [facility] [place]

## 2024-08-08 NOTE — PROGRESS NOTE ADULT - ASSESSMENT
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, PAD, and orthostatic hypotension on midodrine presenting for lightheadedness He was admitted for initial complaint of lightheadedness and low bp despite midodrine, now with melena and coffee ground emesis and acute blood loss anemia, found to hemorrhagic shock due to duodenal bleed s/p GDA embolization and rebleed found to have duodenal ulcer with active oozing s/p clip, epi, and nexpowde.r     Impression:  #Hemorrhagic Shock  #Acute on chronic blood loss anemia  #Duodenal bleed s/p GDA embolization  #LA Grade B Esophagitis  #C-diff infection  #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 Found to have active bleed in second portion of duodenum, unable to identify source underneath large clot on EGD so underwent empiric embolization of GDA by IR for bleeding control. Hgb started dropping after starting cangrelor with worsening renal and liver failure. Repeat EGD 7/12 showed grade D esophagitis, organized clots with extensive duodenal ulcers (some with pigmented spots) w/o active bleeding, treated with purastat. Rebled 7/18 with Hgb drop two points in 9 hours s/p repeat EGD showing duodenal ulcer with active oozing and another ulcer with with visible vessel. 3 Clips and epi applied to oozing ulcer with hemostasis. Nexpowder applied to both ulcers for further hemostasis. No further bleeding since procedure.  Reconsulted 8/7 for large melanotic BM. CT on 8/4 w/o active bleeding noted.  Hgb has remained stable, and rectal exam w/o any gross blood in stool. Unlikely to have reucrrent GI bleeding at this time.     Recommendations:  - Unlikely to have active GI bleeding, dark stools overnight are unlikely to be melena. If any future dark stools, would have picture taken to verify if melanotic. No further work up needed from GI standpoint. Please call back if w/ melena and downtrending Hgb.   - Trend CBC, keep hgb > 8   - Hold plavix and eliquis if able  - Continue  PPI BID for 8 weeks for grade B esophagitis    Note incomplete until finalized by attending signature/attestation.    Jose Nj  GI/Hepatology Fellow, PGY-4    MONDAY-FRIDAY 8AM-5PM:  Please message via [employer] or email [email or web address]     On Weekends/Holidays (All day) and Weekdays after 5 PM to 8 AM  For nonurgent consults please email:  Please email [email or web address]  For urgent consults:  Please contact on call GI team. See Amion schedule ([facility]), [employer] paging system ([facility]), or call hospital  ([facility])

## 2024-08-08 NOTE — PROGRESS NOTE ADULT - PROBLEM SELECTOR PLAN 1
Patient prolonged hospitalization and now with fevers and tachycardia. Poor cough especially in the setting of repeated intubations and extubations last extubated on 7/28. Not feeling SOB, but will get imaging, cultures, and start abx. UCx 8/2 showing Klebsiella and sensitivity to cefepime. Patient denying dysuria. Recent blood cultures showing no growth.  - C/w cefepime 2g q8 for 7d (started 8/2) Patient with persistent UGIB with coffee ground emesis and melanotic stools multiple times requiring IR embolization initially and then EGD with clipping for duodenal ulcer. Patient has been off of his aspirin and plavix for his very recent stent and Eliquis for his chronic afib since he has required so many repeat transfusions due to this active blood loss. The hemorrhagic shock seems to have temporarily resolved. Hgb seems to be stable as well. CTAP 8/4 shows no active bleeding.  - Hold all AC and anti-platelet agents  - Given stool yesterday, pending CTA to assess for bleed per GI  - Protonix BID per GI  - No acute surgical intervention at this time given CTAP findings  - Continue CBC q24 unless having active bleeding  - Transfuse Hgb <8  - Can consider iron chelation therapy given many transfusions  - Maintain T&S; ordered for AM today  - Continue midodrine 10mg q8 Patient with persistent UGIB with coffee ground emesis and melanotic stools multiple times requiring IR embolization initially and then EGD with clipping for duodenal ulcer. Patient has been off of his aspirin and plavix for his very recent stent and Eliquis for his chronic afib since he has required so many repeat transfusions due to this active blood loss. The hemorrhagic shock seems to have temporarily resolved. Hgb seems to be stable as well. CTAP 8/4 shows no active bleeding.  - Hold all AC and anti-platelet agents  - Maintain T&S; ordered for AM today  - Continue midodrine 10mg q8  - Given stool yesterday, pending CTA to assess for bleed per GI  - No acute surgical intervention at this time given previous CTAP findings  - Protonix BID per GI  - Continue CBC q24 unless having active bleeding  - Transfuse Hgb <8  - Can consider iron chelation therapy given many transfusions

## 2024-08-08 NOTE — PROGRESS NOTE ADULT - SUBJECTIVE AND OBJECTIVE BOX
Patient is a 79y old  Male who presents with a chief complaint of hypotension (05 Aug 2024 04:26)    SUBJECTIVE / OVERNIGHT EVENTS:  ***    MEDICATIONS  (STANDING):  albuterol/ipratropium for Nebulization 3 milliLiter(s) Nebulizer every 6 hours  cefepime   IVPB 2000 milliGRAM(s) IV Intermittent every 8 hours  cefepime   IVPB      dextrose 5% + sodium chloride 0.45%. 1000 milliLiter(s) (100 mL/Hr) IV Continuous <Continuous>  dextrose 50% Injectable 25 Gram(s) IV Push once  digoxin     Tablet 125 MICROGram(s) Oral every other day  folic acid Injectable 1 milliGRAM(s) IV Push daily  insulin lispro (ADMELOG) corrective regimen sliding scale   SubCutaneous every 6 hours  metoprolol tartrate 12.5 milliGRAM(s) Oral two times a day  midodrine 10 milliGRAM(s) Oral every 8 hours  octreotide  Injectable 250 MICROGram(s) IV Push three times a day  pantoprazole  Injectable 40 milliGRAM(s) IV Push every 12 hours  sodium chloride 3%  Inhalation 4 milliLiter(s) Inhalation every 12 hours  thiamine Injectable 100 milliGRAM(s) IV Push daily    MEDICATIONS  (PRN):      CAPILLARY BLOOD GLUCOSE      POCT Blood Glucose.: 154 mg/dL (05 Aug 2024 06:00)  POCT Blood Glucose.: 121 mg/dL (05 Aug 2024 00:00)  POCT Blood Glucose.: 114 mg/dL (04 Aug 2024 21:42)  POCT Blood Glucose.: 113 mg/dL (04 Aug 2024 18:52)  POCT Blood Glucose.: 118 mg/dL (04 Aug 2024 11:42)  POCT Blood Glucose.: 125 mg/dL (04 Aug 2024 06:18)    I&O's Summary    04 Aug 2024 07:01  -  05 Aug 2024 06:12  --------------------------------------------------------  IN: 500 mL / OUT: 0 mL / NET: 500 mL        Vital Signs Last 24 Hrs  T(C): 36.9 (05 Aug 2024 04:03), Max: 36.9 (05 Aug 2024 04:03)  T(F): 98.5 (05 Aug 2024 04:03), Max: 98.5 (05 Aug 2024 04:03)  HR: 104 (05 Aug 2024 04:03) (90 - 104)  BP: 99/66 (05 Aug 2024 04:03) (98/63 - 102/68)  BP(mean): --  RR: 18 (05 Aug 2024 04:03) (18 - 18)  SpO2: 98% (05 Aug 2024 04:03) (97% - 100%)    Parameters below as of 05 Aug 2024 04:03  Patient On (Oxygen Delivery Method): nasal cannula  O2 Flow (L/min): 2      PHYSICAL EXAM:  GENERAL: NAD, ill-appearing but comfortable, NC in place, coughing  HEAD: Atraumatic, Normocephalic  EYES: EOMI, PERRLA, conjunctiva and sclera clear  NECK: Supple  CHEST/LUNG: Clear to auscultation bilaterally, No wheezes or crackles  HEART: Normal S1/S2; Irregular rhythm; No murmurs, rubs, or gallops  ABDOMEN: Soft, Nontender, Nondistended  EXTREMITIES: Profound pitting edema in lower extremities until hips, edematous in proximal extremities  PSYCH: A&Ox1-2, improved mood  NEUROLOGY: no focal neurologic deficit    LABS:                        8.4    8.49  )-----------( 109      ( 05 Aug 2024 04:32 )             26.0      08-04    150<H>  |  113<H>  |  26<H>  ----------------------------<  109<H>  3.8   |  24  |  0.83    Ca    7.9<L>      04 Aug 2024 10:23  Phos  2.3     08-04  Mg     1.6     08-04    TPro  4.6<L>  /  Alb  2.4<L>  /  TBili  0.8  /  DBili  x   /  AST  31  /  ALT  18  /  AlkPhos  113  08-04    PT/INR - ( 04 Aug 2024 10:23 )   PT: 12.8 sec;   INR: 1.23 ratio         PTT - ( 04 Aug 2024 10:23 )  PTT:26.9 sec      Urinalysis Basic - ( 04 Aug 2024 10:23 )    Color: x / Appearance: x / SG: x / pH: x  Gluc: 109 mg/dL / Ketone: x  / Bili: x / Urobili: x   Blood: x / Protein: x / Nitrite: x   Leuk Esterase: x / RBC: x / WBC x   Sq Epi: x / Non Sq Epi: x / Bacteria: x     Patient is a 79y old  Male who presents with a chief complaint of hypotension (05 Aug 2024 04:26)    SUBJECTIVE / OVERNIGHT EVENTS:  No overnight events noted. Patient reports some SOB of breath today and some pain in lower extremities. Otherwise he states that he is uncomfortable because of dryness of lips and that he is thirsty.    MEDICATIONS  (STANDING):  albuterol/ipratropium for Nebulization 3 milliLiter(s) Nebulizer every 6 hours  cefepime   IVPB 2000 milliGRAM(s) IV Intermittent every 8 hours  cefepime   IVPB      dextrose 5% + sodium chloride 0.45%. 1000 milliLiter(s) (100 mL/Hr) IV Continuous <Continuous>  dextrose 50% Injectable 25 Gram(s) IV Push once  digoxin     Tablet 125 MICROGram(s) Oral every other day  folic acid Injectable 1 milliGRAM(s) IV Push daily  insulin lispro (ADMELOG) corrective regimen sliding scale   SubCutaneous every 6 hours  metoprolol tartrate 12.5 milliGRAM(s) Oral two times a day  midodrine 10 milliGRAM(s) Oral every 8 hours  octreotide  Injectable 250 MICROGram(s) IV Push three times a day  pantoprazole  Injectable 40 milliGRAM(s) IV Push every 12 hours  sodium chloride 3%  Inhalation 4 milliLiter(s) Inhalation every 12 hours  thiamine Injectable 100 milliGRAM(s) IV Push daily    MEDICATIONS  (PRN):      CAPILLARY BLOOD GLUCOSE      POCT Blood Glucose.: 154 mg/dL (05 Aug 2024 06:00)  POCT Blood Glucose.: 121 mg/dL (05 Aug 2024 00:00)  POCT Blood Glucose.: 114 mg/dL (04 Aug 2024 21:42)  POCT Blood Glucose.: 113 mg/dL (04 Aug 2024 18:52)  POCT Blood Glucose.: 118 mg/dL (04 Aug 2024 11:42)  POCT Blood Glucose.: 125 mg/dL (04 Aug 2024 06:18)    I&O's Summary    04 Aug 2024 07:01  -  05 Aug 2024 06:12  --------------------------------------------------------  IN: 500 mL / OUT: 0 mL / NET: 500 mL        Vital Signs Last 24 Hrs  T(C): 36.9 (05 Aug 2024 04:03), Max: 36.9 (05 Aug 2024 04:03)  T(F): 98.5 (05 Aug 2024 04:03), Max: 98.5 (05 Aug 2024 04:03)  HR: 104 (05 Aug 2024 04:03) (90 - 104)  BP: 99/66 (05 Aug 2024 04:03) (98/63 - 102/68)  BP(mean): --  RR: 18 (05 Aug 2024 04:03) (18 - 18)  SpO2: 98% (05 Aug 2024 04:03) (97% - 100%)    Parameters below as of 05 Aug 2024 04:03  Patient On (Oxygen Delivery Method): nasal cannula  O2 Flow (L/min): 2      PHYSICAL EXAM:  GENERAL: NAD, ill-appearing but comfortable, NC in place, coughing  HEAD: Atraumatic, Normocephalic  EYES: EOMI, conjunctiva and sclera clear  NECK: Supple  CHEST/LUNG: Clear to auscultation bilaterally, No wheezes or crackles  HEART: Normal S1/S2; Irregular rhythm; No murmurs, rubs, or gallops  ABDOMEN: Soft, Nontender, edematous  EXTREMITIES: Profound pitting edema in lower extremities until hips, worsened from yesterday, edematous in proximal extremities  PSYCH: A&Ox1-2, able to state needs  NEUROLOGY: no focal neurologic deficit    LABS:                        8.4    8.49  )-----------( 109      ( 05 Aug 2024 04:32 )             26.0      08-04    150<H>  |  113<H>  |  26<H>  ----------------------------<  109<H>  3.8   |  24  |  0.83    Ca    7.9<L>      04 Aug 2024 10:23  Phos  2.3     08-04  Mg     1.6     08-04    TPro  4.6<L>  /  Alb  2.4<L>  /  TBili  0.8  /  DBili  x   /  AST  31  /  ALT  18  /  AlkPhos  113  08-04    PT/INR - ( 04 Aug 2024 10:23 )   PT: 12.8 sec;   INR: 1.23 ratio         PTT - ( 04 Aug 2024 10:23 )  PTT:26.9 sec      Urinalysis Basic - ( 04 Aug 2024 10:23 )    Color: x / Appearance: x / SG: x / pH: x  Gluc: 109 mg/dL / Ketone: x  / Bili: x / Urobili: x   Blood: x / Protein: x / Nitrite: x   Leuk Esterase: x / RBC: x / WBC x   Sq Epi: x / Non Sq Epi: x / Bacteria: x

## 2024-08-08 NOTE — PROGRESS NOTE ADULT - PROBLEM SELECTOR PLAN 2
Patient with persistent UGIB with coffee ground emesis and melanotic stools multiple times requiring IR embolization initially and then EGD with clipping for duodenal ulcer. Patient has been off of his aspirin and plavix for his very recent stent and Eliquis for his chronic afib since he has required so many repeat transfusions due to this active blood loss. The hemorrhagic shock seems to have temporarily resolved. Hgb seems to be stable as well. CTAP 8/4 shows no active bleeding.  - Hold all AC and anti-platelet agents  - Given stool yesterday, pending CTA to assess for bleed per GI  - Protonix BID per GI  - No acute surgical intervention at this time given CTAP findings  - Continue CBC q24 unless having active bleeding  - Transfuse Hgb <8  - Can consider iron chelation therapy given many transfusions  - Maintain T&S; ordered for AM today Patient prolonged hospitalization and now with fevers and tachycardia. Poor cough especially in the setting of repeated intubations and extubations last extubated on 7/28. Not feeling SOB, but will get imaging, cultures, and start abx. UCx 8/2 showing Klebsiella and sensitivity to cefepime. Patient denying dysuria. Recent blood cultures showing no growth.  - C/w cefepime 2g q8 for 7d (started 8/2) Patient prolonged hospitalization and now with fevers and tachycardia. Poor cough especially in the setting of repeated intubations and extubations last extubated on 7/28. Not feeling SOB, but will get imaging, cultures, and start abx. UCx 8/2 showing Klebsiella and sensitivity to cefepime. Patient denying dysuria. Recent blood cultures showing no growth.  - C/w cefepime 2g q8 for 7d (started 8/2), last day today

## 2024-08-08 NOTE — PROGRESS NOTE ADULT - ATTENDING COMMENTS
Agree with above. Rectal exam today shows greenish stools, no evidence of melena. His H/His also increasing over the last 3 days. CT likely shows artifact from coils and not active bleeding. No plans for repeat endoscopic evaluation at this time. Continue PPI and monitor H/H.

## 2024-08-08 NOTE — PROGRESS NOTE ADULT - PROBLEM SELECTOR PLAN 9
Resolved  Patient with C Diff diarrhea and required vanc (treated with 14 day course followed by prophylactic treatment while on meropenem) DVT: unable to be on anything but SCDs  Diet: pureed diet, mildly thickened liquids  GOC: Full code, palliative to see patient tomorrow, wife states that she would be able to make a meeting today after 2pm, to confirm timing with palliative DVT: unable to be on anything but SCDs  Diet: pureed diet, mildly thickened liquids  GOC: Full code, palliative to see patient today at 2:30, wife states that she would be able to make a meeting today after 2pm, to confirm timing with palliative DVT: unable to be on anything but SCDs  Diet: pureed diet, mildly thickened liquids  GOC: Full code, palliative to see patient today at 2:30 with wife and potentially other children present

## 2024-08-08 NOTE — PROGRESS NOTE ADULT - SUBJECTIVE AND OBJECTIVE BOX
Patient is a 79y old  Male who presents with a chief complaint of hypotension (08 Aug 2024 13:52)    Patient seen and examined  Appears comfortable    MEDICATIONS  (STANDING):  AQUAPHOR (petrolatum Ointment) 1 Application(s) Topical two times a day  cefepime   IVPB 2000 milliGRAM(s) IV Intermittent every 8 hours  cefepime   IVPB      dextrose 50% Injectable 25 Gram(s) IV Push once  digoxin     Tablet 125 MICROGram(s) Oral every other day  folic acid Injectable 1 milliGRAM(s) IV Push daily  insulin lispro (ADMELOG) corrective regimen sliding scale   SubCutaneous every 6 hours  metoprolol tartrate 12.5 milliGRAM(s) Oral two times a day  midodrine 10 milliGRAM(s) Oral every 8 hours  octreotide  Injectable 250 MICROGram(s) IV Push three times a day  pantoprazole  Injectable 40 milliGRAM(s) IV Push every 12 hours  potassium phosphate IVPB 30 milliMole(s) IV Intermittent once  sodium chloride 3%  Inhalation 4 milliLiter(s) Inhalation every 12 hours  thiamine Injectable 100 milliGRAM(s) IV Push daily    MEDICATIONS  (PRN):      Vital Signs Last 24 Hrs  T(C): 36.3 (08 Aug 2024 13:00), Max: 36.6 (08 Aug 2024 04:36)  T(F): 97.4 (08 Aug 2024 13:00), Max: 97.9 (08 Aug 2024 04:36)  HR: 98 (08 Aug 2024 13:00) (86 - 103)  BP: 105/70 (08 Aug 2024 13:00) (95/64 - 113/73)  BP(mean): --  RR: 18 (08 Aug 2024 13:00) (18 - 18)  SpO2: 99% (08 Aug 2024 13:00) (96% - 100%)    Parameters below as of 08 Aug 2024 13:00  Patient On (Oxygen Delivery Method): nasal cannula  O2 Flow (L/min): 3      PE  Awake, alert  Anicteric,  RRR  2L NC  Anasarca  No c/c                          8.7    7.95  )-----------( 104      ( 08 Aug 2024 11:08 )             28.4       08-08    142  |  111<H>  |  21  ----------------------------<  119<H>  3.8   |  22  |  0.76    Ca    7.9<L>      08 Aug 2024 11:08  Phos  1.9     08-08  Mg     1.7     08-08         no

## 2024-08-08 NOTE — PROGRESS NOTE ADULT - SUBJECTIVE AND OBJECTIVE BOX
Patient is a 79y old  Male who presents with a chief complaint of hypotension (08 Aug 2024 13:54)      Interval Events:   No acute events overnight.  Patient denies any acute symptoms at this time. Reported 1 dark BM overnight.     ROS:   A 12-point ROS was performed and negative except as noted in HPI.    Hospital Medications:  AQUAPHOR (petrolatum Ointment) 1 Application(s) Topical two times a day  cefepime   IVPB      cefepime   IVPB 2000 milliGRAM(s) IV Intermittent every 8 hours  dextrose 50% Injectable 25 Gram(s) IV Push once  digoxin     Tablet 125 MICROGram(s) Oral every other day  folic acid Injectable 1 milliGRAM(s) IV Push daily  insulin lispro (ADMELOG) corrective regimen sliding scale   SubCutaneous every 6 hours  metoprolol tartrate 12.5 milliGRAM(s) Oral two times a day  midodrine 10 milliGRAM(s) Oral every 8 hours  octreotide  Injectable 250 MICROGram(s) IV Push three times a day  pantoprazole  Injectable 40 milliGRAM(s) IV Push every 12 hours  potassium phosphate IVPB 30 milliMole(s) IV Intermittent once  sodium chloride 3%  Inhalation 4 milliLiter(s) Inhalation every 12 hours  thiamine Injectable 100 milliGRAM(s) IV Push daily      PHYSICAL EXAM:   Vital Signs:  Vital Signs Last 24 Hrs  T(C): 36.3 (08 Aug 2024 13:00), Max: 36.6 (08 Aug 2024 04:36)  T(F): 97.4 (08 Aug 2024 13:00), Max: 97.9 (08 Aug 2024 04:36)  HR: 98 (08 Aug 2024 13:00) (86 - 103)  BP: 105/70 (08 Aug 2024 13:00) (95/64 - 113/73)  BP(mean): --  RR: 18 (08 Aug 2024 13:00) (18 - 18)  SpO2: 99% (08 Aug 2024 13:00) (96% - 100%)    Parameters below as of 08 Aug 2024 13:00  Patient On (Oxygen Delivery Method): nasal cannula  O2 Flow (L/min): 3    Daily     Daily     GENERAL: no acute distress  NEURO: alert  HEENT: anicteric sclera, no conjunctival pallor appreciated  CHEST: no respiratory distress, no accessory muscle use  CARDIAC: regular rate  ABDOMEN: soft, nondistended, nontender, no rebound or guarding  EXTREMITIES: warm, well perfused, no edema  SKIN: no lesions noted  RECTAL: dark green stool, no blood in rectal vault    LABS: reviewed                        8.7    7.95  )-----------( 104      ( 08 Aug 2024 11:08 )             28.4     08-08    142  |  111<H>  |  21  ----------------------------<  119<H>  3.8   |  22  |  0.76    Ca    7.9<L>      08 Aug 2024 11:08  Phos  1.9     08-08  Mg     1.7     08-08          Interval Diagnostic Studies: see sunrise for full report   Patient is a 79y old  Male who presents with a chief complaint of hypotension (08 Aug 2024 13:54)      Interval Events:   No acute events overnight.  Patient denies any acute symptoms at this time. Reported 1 dark BM per nursing overnight which patient did not witness.    ROS:   A 12-point ROS was performed and negative except as noted in HPI.    Hospital Medications:  AQUAPHOR (petrolatum Ointment) 1 Application(s) Topical two times a day  cefepime   IVPB      cefepime   IVPB 2000 milliGRAM(s) IV Intermittent every 8 hours  dextrose 50% Injectable 25 Gram(s) IV Push once  digoxin     Tablet 125 MICROGram(s) Oral every other day  folic acid Injectable 1 milliGRAM(s) IV Push daily  insulin lispro (ADMELOG) corrective regimen sliding scale   SubCutaneous every 6 hours  metoprolol tartrate 12.5 milliGRAM(s) Oral two times a day  midodrine 10 milliGRAM(s) Oral every 8 hours  octreotide  Injectable 250 MICROGram(s) IV Push three times a day  pantoprazole  Injectable 40 milliGRAM(s) IV Push every 12 hours  potassium phosphate IVPB 30 milliMole(s) IV Intermittent once  sodium chloride 3%  Inhalation 4 milliLiter(s) Inhalation every 12 hours  thiamine Injectable 100 milliGRAM(s) IV Push daily      PHYSICAL EXAM:   Vital Signs:  Vital Signs Last 24 Hrs  T(C): 36.3 (08 Aug 2024 13:00), Max: 36.6 (08 Aug 2024 04:36)  T(F): 97.4 (08 Aug 2024 13:00), Max: 97.9 (08 Aug 2024 04:36)  HR: 98 (08 Aug 2024 13:00) (86 - 103)  BP: 105/70 (08 Aug 2024 13:00) (95/64 - 113/73)  BP(mean): --  RR: 18 (08 Aug 2024 13:00) (18 - 18)  SpO2: 99% (08 Aug 2024 13:00) (96% - 100%)    Parameters below as of 08 Aug 2024 13:00  Patient On (Oxygen Delivery Method): nasal cannula  O2 Flow (L/min): 3    Daily     Daily     GENERAL: no acute distress  NEURO: alert  HEENT: anicteric sclera, no conjunctival pallor appreciated  CHEST: no respiratory distress, no accessory muscle use  CARDIAC: regular rate  ABDOMEN: soft, nondistended, nontender, no rebound or guarding  EXTREMITIES: warm, well perfused, no edema  SKIN: no lesions noted  RECTAL: dark green stool, no blood in rectal vault    LABS: reviewed                        8.7    7.95  )-----------( 104      ( 08 Aug 2024 11:08 )             28.4     Hemoglobin: 8.7 g/dL (08-08-24 @ 11:08)  Hemoglobin: 8.6 g/dL (08-07-24 @ 10:44)  Hemoglobin: 8.3 g/dL (08-06-24 @ 06:01)  Hemoglobin: 8.4 g/dL (08-05-24 @ 04:32)  Hemoglobin: See note (08-05-24 @ 01:19)      08-08    142  |  111<H>  |  21  ----------------------------<  119<H>  3.8   |  22  |  0.76    Ca    7.9<L>      08 Aug 2024 11:08  Phos  1.9     08-08  Mg     1.7     08-08          Interval Diagnostic Studies: see sunrise for full report

## 2024-08-08 NOTE — PROGRESS NOTE ADULT - SUBJECTIVE AND OBJECTIVE BOX
DATE OF SERVICE: 08-08-24 @ 13:52    Patient is a 79y old  Male who presents with a chief complaint of hypotension (08 Aug 2024 05:01)      INTERVAL HISTORY: In no acute distress.     REVIEW OF SYSTEMS:  CONSTITUTIONAL: No weakness  EYES/ENT: No visual changes;  No throat pain   NECK: No pain or stiffness  RESPIRATORY: No cough, wheezing; No shortness of breath  CARDIOVASCULAR: No chest pain or palpitations  GASTROINTESTINAL: No abdominal  pain. No nausea, vomiting, or hematemesis  GENITOURINARY: No dysuria, frequency or hematuria  NEUROLOGICAL: No stroke like symptoms  SKIN: No rashes    TELEMETRY Personally reviewed: AF 80-120s  	  MEDICATIONS:  digoxin     Tablet 125 MICROGram(s) Oral every other day  metoprolol tartrate 12.5 milliGRAM(s) Oral two times a day  midodrine 10 milliGRAM(s) Oral every 8 hours        PHYSICAL EXAM:  T(C): 36.3 (08-08-24 @ 13:00), Max: 36.6 (08-08-24 @ 04:36)  HR: 98 (08-08-24 @ 13:00) (86 - 103)  BP: 105/70 (08-08-24 @ 13:00) (95/64 - 113/73)  RR: 18 (08-08-24 @ 13:00) (18 - 18)  SpO2: 99% (08-08-24 @ 13:00) (96% - 100%)  Wt(kg): --  I&O's Summary        Appearance: In no distress	  HEENT:    PERRL, EOMI	  Cardiovascular:  S1 S2, No JVD  Respiratory: Lungs clear to auscultation	  Gastrointestinal:  Soft, Non-tender, + BS	  Vascularature:  + edema of LE  Psychiatric: Appropriate affect   Neuro: no acute focal deficits                               8.7    7.95  )-----------( 104      ( 08 Aug 2024 11:08 )             28.4     08-08    142  |  111<H>  |  21  ----------------------------<  119<H>  3.8   |  22  |  0.76    Ca    7.9<L>      08 Aug 2024 11:08  Phos  1.9     08-08  Mg     1.7     08-08          Labs personally reviewed      ASSESSMENT/PLAN: 	    78-year-old male multiple medical problems history of hepatocellular carcinoma status post radiation therapy, AF s/p DCCV on Eliquis who was found to be hypotensive following NST. Patient has reported general weakness, fatigue, lightheadedness since being discharged from hospital. Reports mild chest discomfort in midsternal region. Reports dyspnea with minimal exertion. Also reports significant lack of appetite and poor PO intake. No dark or bloody stool, nausea or vomiting.       Problem/Plan - 1:  ·  Problem: Hypotension  ·  Plan: s/p pressors in MICU.  Now transferred to [facility]  - Patient presents with hypotension and also RAZIA. Has known orthostatic hypotension.   - Patient and wife report decreased PO intake likely 2/2 malignancy.  - GIB 7/9, s/p multiple units prbc, IR for mesenteric embolization; Repeat CT A/P with no extravazation  - c/w Midodrine 10mg PO [id number]  - CT A/P pending given new melena     Problem/Plan - 2:  ·  Problem: CAD.   ·  Plan: Recent PCI to pLAD in June 2023  - ECG non-ischemic  - Plavix held given large melena; ideally should be on Plavix but high risk to resume      Problem/Plan - 3:  ·  Problem: Atrial Fibrillation  - s/p succesful DCCV 10/31  - Hold Eliquis 5mg BID given GIB  - s/p Amio  - c/w Dig 125mcg PO QOD  - Metoprolol 12.5mg BID          ROSIO Latham-NP   Hank Hayes DO [facility]  Cardiovascular Medicine  [address] [place] [address], [address]  Available through call or text on [employer]  Office: [phone]

## 2024-08-08 NOTE — PROGRESS NOTE ADULT - PROBLEM SELECTOR PLAN 7
Patient with failure to thrive iso metastatic disease as well as repeated intubation and difficulty swallowing so pending barium swallow. Patient this afternoon (8/5) states willingness to try and family is in agreement.  MBS 8/6 showing no gross aspiration, but still high-risk. Recommended pureed diet with mildly thickened liquids and aspiration precautions given patient and family desire PO diet. NG tube removed 8/6 after study.  - Continue diet, to be supplemented with RD recs  - Monitor electrolytes as high risk for refeeding syndrome Patient with T2DM  - SSI

## 2024-08-08 NOTE — PROGRESS NOTE ADULT - PROBLEM SELECTOR PLAN 4
Resolved  Patient initially with RAZIA in the setting of hypotension possibly related to the hemorrhagic shock but now recovering. Patient with failure to thrive iso metastatic disease as well as repeated intubation and difficulty swallowing so pending barium swallow. Patient this afternoon (8/5) states willingness to try and family is in agreement.  MBS 8/6 showing no gross aspiration, but still high-risk. Recommended pureed diet with mildly thickened liquids and aspiration precautions given patient and family desire PO diet. NG tube removed 8/6 after study.  - Continue diet, to be supplemented with RD recs  - Monitor electrolytes as high risk for refeeding syndrome

## 2024-08-08 NOTE — PROGRESS NOTE ADULT - ASSESSMENT
79 yo male with PMH of CAD s/p PCI x3 with most recent stent 6/12/24 on Plavix, chronic Afib on eliquis, orthostatic hypotension on midodrine, PAD, neuroendocrine tumor with RT currently on hold, recent admission for dx cath on 6/24/24 who presented for hypotension, admitted for GIB and hemorrhagic shock. Found to be bleeding from duodenum. Transferred to MICU, on pressors.     1. Pancreatic NET- metastatic   -- was on lanreotide then had POD in liver and started on peptide receptor radiotherapy (PRRT)- typically given every 8 weeks for 4 doses. He received one dose on 10/20/2023 and planned to get his 2nd dose at the end of December however his course was complicated by multiple hospitalizations that were noncancer related (decompensated heart failure).   -- 5/28/24 PET Dotatate (compared to 9/2023): several new somatostatin avid osseous lesions, some faintly sclerotic, suspicious for mets. Increased upper RP nodes, probably metastatic. Decreased intensely tracer avid right supradiaphragmatic and upper abdominal nodes, suspicious for metastasis. Redemonstrated intensely somatostatin avid bilobar hepatic lesions, consistent with metastases again morphologically difficult to delineate.   -- CT reviewed by [facility]: SINCE MAY 8 2024 INCREASED HEPATIC METASTASES, NEWLY SEEN PRIMARY TUMOR IN THE PANCREAS, and active bleeding within the duodenum with associated intraluminal hematoma.   -- chromogranin level is elevated at 2058, but no prior level at [facility] for review   -- f/u with Dr. Sophia Prasad [place] [facility] [place], no plan for chemotherapy inpatient, if clinically improves/stabilizes then can be considered for treatment outpatient  --Continue octreotide 250mcg TID    2. Duodenal bleed, Hemorrhagic shock  - Now transferred to medicine from MICU, s/p extubation 7/22. NG tube in place.  - CT w/ bleed in duodenum. GI called, EGD 7/9 -> S/p  IR embolization 7/9, intubated in MICU -> s/p extubation 7/15 -> intubated 7/18  - s/p multiple transfusions, fibrinogen low would recommend Cryo for < 100, but coags have improved as are essentially normal now so unlikely, probably was related to liver dysfunction.   - s/p repeat EGD 7/12 showing duodenal lesions w/clots and oozing, no clear targets for intervention and no active bleeding, purastat applied to all areas of oozing.   - S/p EGD 7/18: duodenal ulcer with active oozing, ulcer with visible vessel. Bleeding treated.   - s/p multiple transfusions, per GI, high risk for rebleed, will continue PPI.   - Repeat CT AP w/ No evidence of GI bleed.  Interval endoscopic versus surgical intervention with metallic streak artifact suggestive of surgical clips in the region of the proximal one third portions of the duodenum. Evaluation of the previously seen hematoma is limited secondary to this artifact. Moderate bilateral pleural effusions and associated compressive atelectasis, right greater than left, overall slightly increased from previous CT. Anasarca.  - Per IR, In the absences of any active extravasation on CTA there's no place to target for an embolization  - 7/29 sputum culture + Pseudomonas; RRT on 8/2 due to tachycardia, hypotension; febrile to 101. Currently on cefepime x 7 days  - Continue octreotide 250 mcg TID  - CT AP 8/4: No evidence of active intraluminal extravasation of contrast. No evidence of acute intraperitoneal or retroperitoneal hemorrhage  - Underwent swallow assessment, started mildly thick liquids  - Awaiting CTA a/p d/t reports of melena, H/H stable     3. C. diff   - diarrhea resolved, off vanco    Will continue to follow.    Christen Rosales NP  Hematology/ Oncology  [facility]  [phone] (office)  [phone] (alt office)  Evenings and weekends please call MD on call or office

## 2024-08-08 NOTE — PROGRESS NOTE ADULT - PROBLEM SELECTOR PLAN 8
Patient with T2DM  - SSI Resolved  Patient with C Diff diarrhea and required vanc (treated with 14 day course followed by prophylactic treatment while on meropenem)

## 2024-08-08 NOTE — PROGRESS NOTE ADULT - ATTENDING COMMENTS
78yo M pmh metastatic neuroendocrine pancreatic cancer, CAD s/p PCI x3 with stents placed on 6/12/24, chronic afib, orthostatic hypotension on midodrine, and PAD admitted 7/2 UGIB and hemorrhagic shock requiring MICU for pressors, s/p IR embolization & duodenal ulcer clip, course complicated by C Diff, afib RVR, and RAZIA iso hypotension, and sepsis due to UTI on abx, recurrent melena 8/7 not requiring transfusion pending repeat CTA, guarded prognosis palliative family meeting scheduled this afternoon.    1. sepsis due to UTI: Patient prolonged hospitalization and now with fevers and tachycardia. Poor cough especially in the setting of repeated intubations and extubations last extubated on 7/28. Not feeling SOB, but will get imaging, cultures, and start abx. UCx 8/2 showing gram negative rods. Patient denying dysuria.  -last dose cefepime this evening    2. Hemorrhagic shock: Patient with persistent UGIB with coffee ground emesis and melanotic stools multiple times requiring IR embolization initially and then EGD with clipping for duodenal ulcer. Patient has been off of his aspirin and plavix for his very recent stent and Eliquis for his chronic afib since he has required so many repeat transfusions due to this active blood loss.    - Hold all AC and anti-platelet agents  - Protonix BID per GI  -appreciate GI c/s.  Pending repeat CTA (had negative study on 8/4) given recurrent melena. If negative, will need to discuss further endoscopic evaluation vs. tagged RBC scan. Capsule may be less ideal given pancreatic NET given unknown strictures or intraluminal small bowel masses.   - Transfuse Hgb <8    3. Chronic atrial fibrillation: unable to be on AC for this and seemingly minimally responsive to amiodarone. Cardiology following, no obvious underlying cause aside from hemorrhage, which is difficult to control. Most recent digoxin trough low at 0.7.  - Continue digoxin per cardiology  - Digoxin 125 EOD  - Holding eliquis.    4. CAD: multiple stents done as recently as 6/2024 but unable to be on DAPT due to persistent GIB.  - At risk for stent closure, but difficult to manage due to persistent GIB and recent hemorrhagic shock with multiple events requiring frequent transfusions in just the past several weeks.  -f/u with cards/GI about trialing plavix.  Per report, prior trial of asa 81mg led to mass transfusion needs and had recurrent melena 8/7 off DAPT    5. Neuroendocrine carcinoma: Patient with known pancreatic neuroendocrine tumor and was on lanreotide. Was later on radiotherapy but due to multiple hospitalizations, was unable to receive follow-up doses. Patient did have PET done in May that showed lesions suspicious for mets. CT also in May showed increased hepatic metastases.   - Continue octreotide 250mcg TID.    6. GOC:  Palliative reconsult to assist with conversations given multiple comorbididities and prolonged hospitalization.  Per patient he lives with wife, has no HHAs, prior to hospitalization walked ~1 block.  Gets most yennifer from seeing his grandkids on the weekends.  Family meeting scheduled this afternoon    7. Anasarca  -patient anasarca, suspect combination of volume overload from transfusions and low albumin.   -limited diuresis 2/2 low BPs on midodrine.    8. DM2: controlled on slide scale    9. cdiff: s/p oral vanc    contact: TEAMS .

## 2024-08-08 NOTE — PROGRESS NOTE ADULT - PROBLEM SELECTOR PLAN 10
DVT: unable to be on anything but SCDs  Diet: pureed diet, mildly thickened liquids  GOC: Full code, palliative to see patient tomorrow, wife states that she would be able to make a meeting today after 2pm, to confirm timing with palliative

## 2024-08-09 NOTE — PROGRESS NOTE ADULT - PROBLEM SELECTOR PLAN 1
Patient with persistent UGIB with coffee ground emesis and melanotic stools multiple times requiring IR embolization initially and then EGD with clipping for duodenal ulcer. Patient has been off of his aspirin and plavix for his very recent stent and Eliquis for his chronic afib since he has required so many repeat transfusions due to this active blood loss. The hemorrhagic shock seems to have temporarily resolved. Hgb seems to be stable as well. CTAP 8/4 shows no active bleeding, CTAP 8/8 no strong indication of active bleeding.  - Hold all AC and anti-platelet agents  - Maintain T&S; ordered for AM today  - Continue midodrine 10mg q8  - No acute surgical or GI intervention at this time given previous CTAP findings  - Protonix BID per GI  - Continue CBC q24 unless having active bleeding  - Transfuse Hgb <8  - Can consider iron chelation therapy given many transfusions Patient with persistent UGIB with coffee ground emesis and melanotic stools multiple times requiring IR embolization initially and then EGD with clipping for duodenal ulcer. Patient has been off of his aspirin and plavix for his very recent stent and Eliquis for his chronic afib since he has required so many repeat transfusions due to this active blood loss. The hemorrhagic shock seems to have temporarily resolved. Hgb seems to be stable as well. CTAP 8/4 shows no active bleeding, CTAP 8/8 no strong indication of active bleeding.  - Hold all AC and anti-platelet agents  - Maintain T&S (last checked 8/8)  - Checked CBC with concern of dark stools; however, only mild drop from yesterday, VSS, and clinically unchanged  - Continue midodrine 10mg q8; consider increasing if necessary with diuresis  - No acute surgical or GI intervention at this time given previous CTAP findings  - Protonix BID per GI  - Continue CBC q24 unless having active bleeding  - Transfuse Hgb <8  - Can consider iron chelation therapy given many transfusions

## 2024-08-09 NOTE — PROGRESS NOTE ADULT - SUBJECTIVE AND OBJECTIVE BOX
Patient is a 79y old  Male who presents with a chief complaint of hypotension (05 Aug 2024 04:26)    SUBJECTIVE / OVERNIGHT EVENTS:  Overnight noted to have 24 beats of NSVT with additional smaller run at another time.    MEDICATIONS  (STANDING):  albuterol/ipratropium for Nebulization 3 milliLiter(s) Nebulizer every 6 hours  cefepime   IVPB 2000 milliGRAM(s) IV Intermittent every 8 hours  cefepime   IVPB      dextrose 5% + sodium chloride 0.45%. 1000 milliLiter(s) (100 mL/Hr) IV Continuous <Continuous>  dextrose 50% Injectable 25 Gram(s) IV Push once  digoxin     Tablet 125 MICROGram(s) Oral every other day  folic acid Injectable 1 milliGRAM(s) IV Push daily  insulin lispro (ADMELOG) corrective regimen sliding scale   SubCutaneous every 6 hours  metoprolol tartrate 12.5 milliGRAM(s) Oral two times a day  midodrine 10 milliGRAM(s) Oral every 8 hours  octreotide  Injectable 250 MICROGram(s) IV Push three times a day  pantoprazole  Injectable 40 milliGRAM(s) IV Push every 12 hours  sodium chloride 3%  Inhalation 4 milliLiter(s) Inhalation every 12 hours  thiamine Injectable 100 milliGRAM(s) IV Push daily    MEDICATIONS  (PRN):      CAPILLARY BLOOD GLUCOSE      POCT Blood Glucose.: 154 mg/dL (05 Aug 2024 06:00)  POCT Blood Glucose.: 121 mg/dL (05 Aug 2024 00:00)  POCT Blood Glucose.: 114 mg/dL (04 Aug 2024 21:42)  POCT Blood Glucose.: 113 mg/dL (04 Aug 2024 18:52)  POCT Blood Glucose.: 118 mg/dL (04 Aug 2024 11:42)  POCT Blood Glucose.: 125 mg/dL (04 Aug 2024 06:18)    I&O's Summary    04 Aug 2024 07:01  -  05 Aug 2024 06:12  --------------------------------------------------------  IN: 500 mL / OUT: 0 mL / NET: 500 mL        Vital Signs Last 24 Hrs  T(C): 36.9 (05 Aug 2024 04:03), Max: 36.9 (05 Aug 2024 04:03)  T(F): 98.5 (05 Aug 2024 04:03), Max: 98.5 (05 Aug 2024 04:03)  HR: 104 (05 Aug 2024 04:03) (90 - 104)  BP: 99/66 (05 Aug 2024 04:03) (98/63 - 102/68)  BP(mean): --  RR: 18 (05 Aug 2024 04:03) (18 - 18)  SpO2: 98% (05 Aug 2024 04:03) (97% - 100%)    Parameters below as of 05 Aug 2024 04:03  Patient On (Oxygen Delivery Method): nasal cannula  O2 Flow (L/min): 2      PHYSICAL EXAM:  GENERAL: NAD, ill-appearing but comfortable, NC in place, coughing  HEAD: Atraumatic, Normocephalic  EYES: EOMI, conjunctiva and sclera clear  NECK: Supple  CHEST/LUNG: Clear to auscultation bilaterally, No wheezes or crackles  HEART: Normal S1/S2; Irregular rhythm; No murmurs, rubs, or gallops  ABDOMEN: Soft, Nontender, edematous  EXTREMITIES: Profound pitting edema in lower extremities until hips, worsened from yesterday, edematous in proximal extremities  PSYCH: A&Ox1-2, able to state needs  NEUROLOGY: no focal neurologic deficit    LABS:                        8.4    8.49  )-----------( 109      ( 05 Aug 2024 04:32 )             26.0      08-04    150<H>  |  113<H>  |  26<H>  ----------------------------<  109<H>  3.8   |  24  |  0.83    Ca    7.9<L>      04 Aug 2024 10:23  Phos  2.3     08-04  Mg     1.6     08-04    TPro  4.6<L>  /  Alb  2.4<L>  /  TBili  0.8  /  DBili  x   /  AST  31  /  ALT  18  /  AlkPhos  113  08-04    PT/INR - ( 04 Aug 2024 10:23 )   PT: 12.8 sec;   INR: 1.23 ratio         PTT - ( 04 Aug 2024 10:23 )  PTT:26.9 sec      Urinalysis Basic - ( 04 Aug 2024 10:23 )    Color: x / Appearance: x / SG: x / pH: x  Gluc: 109 mg/dL / Ketone: x  / Bili: x / Urobili: x   Blood: x / Protein: x / Nitrite: x   Leuk Esterase: x / RBC: x / WBC x   Sq Epi: x / Non Sq Epi: x / Bacteria: x     Patient is a 79y old  Male who presents with a chief complaint of hypotension (05 Aug 2024 04:26)    SUBJECTIVE / OVERNIGHT EVENTS:  Yesterday, began a GOC conversation between myself and the family (daughter and wife) with concern for patient's quality of life during extended hospital stay. Encouraged family to continue discussing among themselves about long-term goals given patient's current conditions that are currently barring any oncologic treatment.    Overnight noted to have 24 beats of NSVT with additional smaller run at another time. Per nurse, dark stools overnight. Patient sleepy this morning but able to answer questions. He denies any new pain. He denies shortness of breath. He denies any other complaints.    MEDICATIONS  (STANDING):  albuterol/ipratropium for Nebulization 3 milliLiter(s) Nebulizer every 6 hours  cefepime   IVPB 2000 milliGRAM(s) IV Intermittent every 8 hours  cefepime   IVPB      dextrose 5% + sodium chloride 0.45%. 1000 milliLiter(s) (100 mL/Hr) IV Continuous <Continuous>  dextrose 50% Injectable 25 Gram(s) IV Push once  digoxin     Tablet 125 MICROGram(s) Oral every other day  folic acid Injectable 1 milliGRAM(s) IV Push daily  insulin lispro (ADMELOG) corrective regimen sliding scale   SubCutaneous every 6 hours  metoprolol tartrate 12.5 milliGRAM(s) Oral two times a day  midodrine 10 milliGRAM(s) Oral every 8 hours  octreotide  Injectable 250 MICROGram(s) IV Push three times a day  pantoprazole  Injectable 40 milliGRAM(s) IV Push every 12 hours  sodium chloride 3%  Inhalation 4 milliLiter(s) Inhalation every 12 hours  thiamine Injectable 100 milliGRAM(s) IV Push daily    MEDICATIONS  (PRN):      POCT Blood Glucose.: 154 mg/dL (05 Aug 2024 06:00)  POCT Blood Glucose.: 121 mg/dL (05 Aug 2024 00:00)  POCT Blood Glucose.: 114 mg/dL (04 Aug 2024 21:42)  POCT Blood Glucose.: 113 mg/dL (04 Aug 2024 18:52)  POCT Blood Glucose.: 118 mg/dL (04 Aug 2024 11:42)  POCT Blood Glucose.: 125 mg/dL (04 Aug 2024 06:18)    I&O's Summary    04 Aug 2024 07:01  -  05 Aug 2024 06:12  --------------------------------------------------------  IN: 500 mL / OUT: 0 mL / NET: 500 mL        Vital Signs Last 24 Hrs  T(C): 36.9 (05 Aug 2024 04:03), Max: 36.9 (05 Aug 2024 04:03)  T(F): 98.5 (05 Aug 2024 04:03), Max: 98.5 (05 Aug 2024 04:03)  HR: 104 (05 Aug 2024 04:03) (90 - 104)  BP: 99/66 (05 Aug 2024 04:03) (98/63 - 102/68)  BP(mean): --  RR: 18 (05 Aug 2024 04:03) (18 - 18)  SpO2: 98% (05 Aug 2024 04:03) (97% - 100%)    Parameters below as of 05 Aug 2024 04:03  Patient On (Oxygen Delivery Method): nasal cannula  O2 Flow (L/min): 2      PHYSICAL EXAM:  GENERAL: NAD, ill-appearing but comfortable, NC in place, sleeping  HEAD: Atraumatic, Normocephalic  EYES: EOMI, conjunctiva and sclera clear  NECK: Supple  CHEST/LUNG: Coarse lung sounds bilaterally but unclear if transmitted upper airway sounds, No wheezes or crackles  HEART: Normal S1/S2; Irregular rhythm; No murmurs, rubs, or gallops  ABDOMEN: Soft, Nontender, edematous  EXTREMITIES: Profound pitting edema in lower extremities until hips, worsened from yesterday, edematous in proximal extremities; pitting is deep but rebounds to quarter of depth within seconds  PSYCH: A&Ox1-2, able to state needs  NEUROLOGY: no focal neurologic deficit    LABS:                        8.4    8.49  )-----------( 109      ( 05 Aug 2024 04:32 )             26.0      08-04    150<H>  |  113<H>  |  26<H>  ----------------------------<  109<H>  3.8   |  24  |  0.83    Ca    7.9<L>      04 Aug 2024 10:23  Phos  2.3     08-04  Mg     1.6     08-04    TPro  4.6<L>  /  Alb  2.4<L>  /  TBili  0.8  /  DBili  x   /  AST  31  /  ALT  18  /  AlkPhos  113  08-04    PT/INR - ( 04 Aug 2024 10:23 )   PT: 12.8 sec;   INR: 1.23 ratio         PTT - ( 04 Aug 2024 10:23 )  PTT:26.9 sec      Urinalysis Basic - ( 04 Aug 2024 10:23 )    Color: x / Appearance: x / SG: x / pH: x  Gluc: 109 mg/dL / Ketone: x  / Bili: x / Urobili: x   Blood: x / Protein: x / Nitrite: x   Leuk Esterase: x / RBC: x / WBC x   Sq Epi: x / Non Sq Epi: x / Bacteria: x

## 2024-08-09 NOTE — PROGRESS NOTE ADULT - PROBLEM SELECTOR PLAN 4
Patient with chronic afib unable to be on AC for this and seemingly minimally responsive to amiodarone. Cardiology following, no obvious underlying cause aside from hemorrhage, which is difficult to control. Most recent digoxin trough low at 0.7.  - Digoxin 125mcg every other day  - Continue current digoxin dose per cardiology  - Holding eliquis Patient with failure to thrive iso metastatic disease as well as repeated intubation and difficulty swallowing so pending barium swallow. Patient this afternoon (8/5) states willingness to try and family is in agreement.  MBS 8/6 showing no gross aspiration, but still high-risk. Recommended pureed diet with mildly thickened liquids and aspiration precautions given patient and family desire PO diet. NG tube removed 8/6 after study.  - Continue diet, to be supplemented with RD recs  - Monitor electrolytes as high risk for refeeding syndrome  - GOC meeting today with family and palliative at 2pm, patient's wife and daughter to be present

## 2024-08-09 NOTE — PROGRESS NOTE ADULT - PROBLEM SELECTOR PLAN 3
Patient's profound edema likely 2/2 large volume in during this admission as well as malnutrition. Previous POCUS have not identified a cardiac cause.    Plan:  - Lasix 40mg today with 25% albumin 100 mL, monitoring BPs  - Ordered BNP

## 2024-08-09 NOTE — PROGRESS NOTE ADULT - NS ATTEND AMEND GEN_ALL_CORE FT
Agree with above assessment and plan.   Cdiff resolved. Pseudomonas in sputum/ Klebsiella in urine completed course of cefepime on 8/8. Hemoglobin has been stable. Continue with octreotide. [place] [facility]

## 2024-08-09 NOTE — PROGRESS NOTE ADULT - SUBJECTIVE AND OBJECTIVE BOX
SUBJECTIVE AND OBJECTIVE:  Patient awake and alert, eating breakfast with assistance from PCA,  no evidence of aspiration  Indication for Geriatrics and Palliative Care Services/INTERVAL HPI:  goals of care     OVERNIGHT EVENTS:  NSVT    DNR on chart:  Allergies    No Known Allergies    Intolerances    MEDICATIONS  (STANDING):  albumin human 25% IVPB 100 milliLiter(s) IV Intermittent once  AQUAPHOR (petrolatum Ointment) 1 Application(s) Topical two times a day  chlorhexidine 2% Cloths 1 Application(s) Topical <User Schedule>  dextrose 50% Injectable 25 Gram(s) IV Push once  digoxin     Tablet 125 MICROGram(s) Oral every other day  folic acid Injectable 1 milliGRAM(s) IV Push daily  furosemide   Injectable 40 milliGRAM(s) IV Push once  insulin lispro (ADMELOG) corrective regimen sliding scale   SubCutaneous at bedtime  insulin lispro (ADMELOG) corrective regimen sliding scale   SubCutaneous three times a day before meals  magnesium sulfate  IVPB 1 Gram(s) IV Intermittent once  metoprolol tartrate 12.5 milliGRAM(s) Oral two times a day  midodrine 10 milliGRAM(s) Oral every 8 hours  octreotide  Injectable 250 MICROGram(s) IV Push three times a day  pantoprazole  Injectable 40 milliGRAM(s) IV Push every 12 hours  potassium phosphate / sodium phosphate Powder (PHOS-NaK) 1 Packet(s) Oral once  potassium phosphate IVPB 30 milliMole(s) IV Intermittent once  sodium chloride 3%  Inhalation 4 milliLiter(s) Inhalation every 12 hours  thiamine Injectable 100 milliGRAM(s) IV Push daily    MEDICATIONS  (PRN):      ITEMS UNCHECKED ARE NOT PRESENT    PRESENT SYMPTOMS: [ ]Unable to self-report - see [ ] CPOT [ ] PAINADS [ ] RDOS  Source if other than patient:  [ ]Family   [ ]Team     Pain:  [ ]yes [x ]no  QOL impact -   Location -                    Aggravating factors -  Quality -  Radiation -  Timing-  Severity (0-10 scale):  Minimal acceptable level (0-10 scale):     CPOT:    https://www.sccm.org/getattachment/ubp96f91-6j8z-4j9u-7p1s-0666a9899v7g/Critical-Care-Pain-Observation-Tool-(CPOT)    PAIN AD Score:	  http://geriatrictoolkit.[email or web address]/cog/painad.pdf (Ctrl + left click to view)    Dyspnea:                           [ ]Mild [ ]Moderate [ ]Severe    RDOS:  0 to 2  minimal or no respiratory distress   3  mild distress  4 to 6 moderate distress  >7 severe distress  https://homecareinformation.net/handouts/hen/Respiratory_Distress_Observation_Scale.pdf (Ctrl +  left click to view)     Anxiety:                             [x ]Mild [ ]Moderate [ ]Severe  Fatigue:                             [ ]Mild [x ]Moderate [ ]Severe  Nausea:                             [ ]Mild [ ]Moderate [ ]Severe  Loss of appetite:              [ ]Mild [x ]Moderate [ ]Severe  Constipation:                    [ ]Mild [ ]Moderate [ ]Severe    PCSSQ[Palliative Care Spiritual Screening Question]   Severity (0-10): 3  Score of 4 or > indicate consideration of Chaplaincy referral.  Chaplaincy Referral: [ ] yes [ ] refused [ ] following  xx deferred    Caregiver Maud? : [x ] yes [ ] no  Social work referral [ ] Patient & Family Centered Care Referral [ ]     Anticipatory Grief present?:  [ x] yes [ ] no  Social work referral [ ] Patient & Family Centered Care Referral [ ]      Other Symptoms:  [ x]All other review of systems negative     Palliative Performance Status Version 2:   30     %      http://npcrc.org/files/news/palliative_performance_scale_ppsv2.pdf  PHYSICAL EXAM:  Vital Signs Last 24 Hrs  T(C): 36.1 (09 Aug 2024 12:00), Max: 36.7 (08 Aug 2024 23:40)  T(F): 96.9 (09 Aug 2024 12:00), Max: 98 (08 Aug 2024 23:40)  HR: 93 (09 Aug 2024 12:00) (82 - 106)  BP: 107/69 (09 Aug 2024 12:00) (95/60 - 108/71)  BP(mean): --  RR: 18 (09 Aug 2024 12:00) (18 - 18)  SpO2: 100% (09 Aug 2024 12:00) (98% - 100%)    Parameters below as of 09 Aug 2024 12:00  Patient On (Oxygen Delivery Method): nasal cannula  O2 Flow (L/min): 2   I&O's Summary    08 Aug 2024 07:01  -  09 Aug 2024 07:00  --------------------------------------------------------  IN: 550 mL / OUT: 0 mL / NET: 550 mL       GENERAL: [ ]Cachexia    [x ]Alert  [x ]Oriented x 2-3   [ ]Lethargic  [ ]Unarousable  [ x]Verbal  [ ]Non-Verbal  Behavioral:   [ ]Anxiety  [ ]Delirium [ ]Agitation [ x]Other  HEENT:  [ ]Normal   [x ]Dry mouth   [ ]ET Tube/Trach  [ ]Oral lesions  PULMONARY:   [ ]Clear [ ]Tachypnea  [ ]Audible excessive secretions   [ ]Rhonchi        [ ]Right [ ]Left [ ]Bilateral  [ ]Crackles        [ ]Right [ ]Left [ ]Bilateral  [ ]Wheezing     [ ]Right [ ]Left [ ]Bilateral  [ x]Diminished BS [ ] Right [ ]Left [ ]Bilateral  CARDIOVASCULAR:    [ ]Regular [x ]Irregular [ ]Tachy  [ ]Lavelle [ ]Murmur [ ]Other  GASTROINTESTINAL:  [x ]Softly distended   [ ]Distended   [x ]+BS  [ ]Non tender [ ]Tender  [ ]Other [ ]PEG [ ]OGT/ NGT   Last BM:   GENITOURINARY:  [ ]Normal [x ]Incontinent   [ ]Oliguria/Anuria   [ ]Ivory  MUSCULOSKELETAL:   [ ]Normal   [x ]Weakness  [ x]Bed/Wheelchair bound [ ]Edema  NEUROLOGIC: Mild cognitive impairment   [ ]No focal deficits  [ ] Cognitive impairment  [x ] Dysphagia [ ]Dysarthria [ ] Paresis [ ]Other   SKIN:   [ ]Normal  [ ]Rash  [x ]Other  [ ]Pressure ulcer(s) [ ]y [ ]n present on admission    CRITICAL CARE:  [ ]Shock Present  [ ]Septic [ ]Cardiogenic [ ]Neurologic [ ]Hypovolemic  [ ]Vasopressors [ ]Inotropes  [ ]Respiratory failure present [ ]Mechanical Ventilation [ ]Non-invasive ventilatory support [ ]High-Flow   [ ]Acute  [ ]Chronic [ ]Hypoxic  [ ]Hypercarbic [ ]Other  [ ]Other organ failure     LABS:                        8.4    6.81  )-----------( 96       ( 09 Aug 2024 11:03 )             27.4   08-09    143  |  112<H>  |  22  ----------------------------<  117<H>  4.1   |  22  |  0.76    Ca    8.0<L>      09 Aug 2024 11:03  Phos  2.4     08-09  Mg     1.8     08-09    PT/INR - ( 08 Aug 2024 11:08 )   PT: 13.4 sec;   INR: 1.29 ratio         PTT - ( 08 Aug 2024 11:08 )  PTT:27.4 sec    Urinalysis Basic - ( 09 Aug 2024 11:03 )    Color: x / Appearance: x / SG: x / pH: x  Gluc: 117 mg/dL / Ketone: x  / Bili: x / Urobili: x   Blood: x / Protein: x / Nitrite: x   Leuk Esterase: x / RBC: x / WBC x   Sq Epi: x / Non Sq Epi: x / Bacteria: x      RADIOLOGY & ADDITIONAL STUDIES:    < from: CT Angio Abdomen and Pelvis w/ IV Cont (08.08.24 @ 14:03) >  ACC: [id number] EXAM:  CT ANGIO ABD PELV (W)AW IC   ORDERED BY:  HEATHER CALLAHAN     PROCEDURE DATE:  08/08/2024          INTERPRETATION:  CLINICAL INFORMATION: New melena. Metastatic pancreatic   neuroendocrine tumor.    COMPARISON: CT abdomen and pelvis8/4/2024.    CONTRAST/COMPLICATIONS:  IV Contrast: Omnipaque 350  90 cc administered   10 cc discarded  Oral Contrast: NONE  Complications: None reported at time of study completion    PROCEDURE:  CT of the Abdomen and Pelvis was performed.  Precontrast, Arterial and Delayed phases were performed.  Sagittal and coronal reformats were performed.    FINDINGS:  LOWER CHEST: Moderate bilateral pleural effusions. Complete atelectasis   of the imaged left lower lobe. Partial atelectasis in the left upper and   right lower lobes. Coronary artery and aortic valve calcifications.   Cardiomegaly. Partially imaged soft tissue gas tracking between the left   pectoralis major and minor muscles.    LIVER: Nodular liver contour. Again seen are numerous ill-defined   hypoattenuating lesions throughout the liver, the largest in the right   lobe measuring approximately 7.0 cm.  BILE DUCTS: Normal caliber.  GALLBLADDER: Layering hyperdense material, which may reflect vicarious   excretion of contrast, sludge, or stones.  SPLEEN: Within normal limits.  PANCREAS: Within normal limits.  ADRENALS: Within normal limits.  KIDNEYS/URETERS: No hydronephrosis. Nonobstructing left renal calculus   measuring 5 mm. Bilateral renal cortical thinning. Bilateral hypodense   renal lesions.    BLADDER: Hyperdense fluid within the bladder could reflect renally   excreted contrast. Small amount of gas within the bladder, slightly   decreased since the prior CT. Several bladder stones with example   measuring 3.5 x 2.3cm.  REPRODUCTIVE ORGANS: The prostate is normal in size and contains coarse   calcification.    BOWEL: Residual high density oral contrast within the colon from recent   fluoroscopic barium swallow study, which markedly limits evaluation of   the colon for active hemorrhage, and also causes streak artifact that   obscures adjacent structures. There is also streak artifact from spinal   hardware, embolization material in the GDA distribution, and clips within   the duodenum, which also limits assessment for active gastrointestinal   hemorrhage. Question a focus of arterial enhancement immediately adjacent   to the duodenal clips (series 6 image 100), without definite pooling   appreciated on the delayed phase. No bowel obstruction.  PERITONEUM/RETROPERITONEUM: Small amount of peritoneal fluid and edema of   the peritoneum.  VESSELS: Atherosclerotic changes. Embolization material in the GDA   distribution.  LYMPH NODES: No lymphadenopathy.  ABDOMINAL WALL: Diffuse subcutaneous edema. Tiny fat-containing umbilical   hernia.  BONES: Right hip arthroplasty with associated streak artifact obscuring   portions of the pelvis. There is also extensive spinal hardware with   streak artifact obscuring adjacent structures. Posterior fusion spanning   T10-S1 with bilateral sacroiliac extension. Interbody fusion at L5-S1.   Multilevel lumbar laminectomies.    IMPRESSION:  *  Limited evaluation for active gastrointestinal hemorrhage due to high   density oral contrast within the colon and streakartifact from   orthopedic hardware, embolization material, and duodenal clips. Question   a focus of enhancement in the duodenum adjacent to the duodenal clips   versus streak artifact, with active hemorrhage within the duodenum not   excluded.  *  Moderate bilateral pleural effusions. Marked bibasilar atelectasis.  *  Partially imaged soft tissue gas tracking between the left pectoralis   major and minor muscles of uncertain etiology.  *  Additional findings as described in the report.    Findings were discussed with Dr. Jodie Okeefe 8/8/2024 3:12 PM by Dr. Almonte with read back confirmation.    --- End of Report ---      < end of copied text >      Protein Calorie Malnutrition Present: [ ]mild [ x]moderate [ ]severe [ ]underweight [ ]morbid obesity  https://www.andeal.org/vault/8690/web/files/ONC/Table_Clinical%20Characteristics%20to%20Document%20Malnutrition-White%20JV%20et%20al%878450.pdf    Height (cm): 188 (07-09-24 @ 13:27), 188 (06-24-24 @ 13:43), 188 (06-12-24 @ 11:58)  Weight (kg): 125.6 (07-21-24 @ 00:00), 102.5 (06-24-24 @ 13:43), 102.1 (06-12-24 @ 11:58)  BMI (kg/m2): 35.5 (07-21-24 @ 00:00), 29 (07-09-24 @ 13:27), 29 (06-24-24 @ 13:43)    [ ]PPSV2 < or = 30%  [ ]significant weight loss [ x]poor nutritional intake [ ]anasarca[ ]Artificial Nutrition    Other REFERRALS:  [ ]Hospice  [ ]Child Life  [ ]Social Work  [x ]Case management [ ]Holistic Therapy

## 2024-08-09 NOTE — PROGRESS NOTE ADULT - PROBLEM SELECTOR PLAN 3
Patient prolonged hospitalization and now with fevers and tachycardia. Poor cough especially in the setting of repeated intubations and extubations last extubated on 7/28. Not feeling SOB, but will get imaging, cultures, and start abx. UCx 8/2 showing Klebsiella and sensitivity to cefepime. Patient denying dysuria. Recent blood cultures showing no growth.  - S/p cefepime 2g q8 for 7d (8/2-8/8) Patient's profound edema likely 2/2 large volume in during this admission as well as malnutrition. Previous POCUS have not identified a cardiac cause.    Plan:  - Lasix 40mg today with 25% albumin 100 mL, monitoring BPs  - Ordered BNP

## 2024-08-09 NOTE — PROGRESS NOTE ADULT - PROBLEM SELECTOR PLAN 5
Afebrile.   UCx 8/2 showing Klebsiella and sensitivity to cefepime.  Recent blood cultures showing no growth.  - S/p cefepime 2g q8 for 7d (8/2-8/8)

## 2024-08-09 NOTE — PROGRESS NOTE ADULT - ATTENDING COMMENTS
78yo M pmh metastatic neuroendocrine pancreatic cancer, CAD s/p PCI x3 with stents placed on 6/12/24, chronic afib, orthostatic hypotension on midodrine, and PAD admitted 7/2 UGIB and hemorrhagic shock requiring MICU for pressors, s/p IR embolization & duodenal ulcer clip, course complicated by C Diff, afib RVR, and RAZIA iso hypotension, and sepsis due to UTI on abx, concern for recurrent melena 8/7 however did not require transfusion, repeat CTA  likely shows artifact from coils and not active bleeding per GI with No plans for repeat endoscopic evaluation at this time, guarded prognosis palliative family meeting scheduled this afternoon.    1. sepsis due to UTI: Patient prolonged hospitalization and now with fevers and tachycardia. Poor cough especially in the setting of repeated intubations and extubations last extubated on 7/28. Not feeling SOB, but will get imaging, cultures, and start abx. UCx 8/2 showing gram negative rods. Patient denying dysuria.  -completed cefepime 8/8    2. Hemorrhagic shock: Patient with persistent UGIB with coffee ground emesis and melanotic stools multiple times requiring IR embolization initially and then EGD with clipping for duodenal ulcer. Patient has been off of his aspirin and plavix for his very recent stent and Eliquis for his chronic afib since he has required so many repeat transfusions due to this active blood loss.    - Hold all AC and anti-platelet agents  - Protonix BID per GI  -appreciate GI c/s.  Per GI no plans for further testing as they suspect CTA was artifact and no active bleeding, and current rectal exam without melena.  - Transfuse Hgb <8    3. Chronic atrial fibrillation: unable to be on AC for this and seemingly minimally responsive to amiodarone. Cardiology following, no obvious underlying cause aside from hemorrhage, which is difficult to control.  - Continue digoxin per cardiology  - Digoxin 125 EOD  - Holding eliquis.    4. CAD: multiple stents done as recently as 6/2024 but unable to be on DAPT due to persistent GIB.  - At risk for stent closure, but difficult to manage due to persistent GIB and recent hemorrhagic shock with multiple events requiring frequent transfusions in just the past several weeks.  -Per report, prior trial of asa 81mg led to mass transfusion needs and had recurrent melena 8/7 off DAPT.    5. Neuroendocrine carcinoma: Patient with known pancreatic neuroendocrine tumor and was on lanreotide. Was later on radiotherapy but due to multiple hospitalizations, was unable to receive follow-up doses. Patient did have PET done in May that showed lesions suspicious for mets. CT also in May showed increased hepatic metastases.   - Continue octreotide 250mcg TID.    6. GOC:  Palliative reconsult to assist with conversations given multiple comorbididities and prolonged hospitalization.  Per patient he lives with wife, has no HHAs, prior to hospitalization walked ~1 block.  Gets most yennifer from seeing his grandkids on the weekends.  Family meeting scheduled this afternoon    7. Anasarca  -patient anasarca, suspect combination of volume overload from transfusions and low albumin.   -limited diuresis 2/2 low BPs on midodrine.  -appreciate cards c/s: attempting albumin assisted diuresis    8. DM2: controlled on slide scale    9. cdiff: s/p oral vanc    contact: TEAMS .

## 2024-08-09 NOTE — PROGRESS NOTE ADULT - PROBLEM SELECTOR PLAN 1
Patient with persistent UGIB with coffee ground emesis and melanotic stools multiple times requiring IR embolization initially and then EGD with clipping for duodenal ulcer.    Hemorrhagic shock seems to have temporarily resolved. Hgb seems to be stable as well.   CTAP 8/4 shows no active bleeding, CTAP 8/8 no strong indication of active bleeding.  Management per primary team

## 2024-08-09 NOTE — PROGRESS NOTE ADULT - PROBLEM SELECTOR PLAN 2
Patient with 24 beats wide complex tachycardia for 9.8 seconds and another 15 beats overnight.    Plan per primary team   - Repleted magnesium and phosphorus today  - Continue to monitor

## 2024-08-09 NOTE — PROGRESS NOTE ADULT - CONVERSATION DETAILS
Meeting scheduled for 2 pm  Will update accordingly Met with patients wife Yamilet/surrogate and daughter/Pia face to face for greater than 55 minutes.  Family have demonstrated  a good understanding of patients overall medical issues through teach back method.  They confirm meeting with providers yesterday , and received updates on patients acute issues including but not limited to GI bleed.     I explained that patients condition continues to be tenuous, often requiring work up for fevers (?aspiration),  transfusions (GIB), EGD ( GI Bleed) and that these ongoing acute issues continue to further debilitate the patient. I further explained that each acute episodes pushe him further away from receiving any disease modifying therapies from a Hem / Onc standpoint.  I explained that this may be a time where the family comes together to concentrate  on quality of life instead of pursuing continued medical interventions that have clearly not been successful for some level of recovery/stability, in order to continue treatments for his neuroendocrine cancer.      I explained home hospice services at length, its philosophy and the coverage provided vs long term care facility with hospice services. Appropriately tearful, Yamilet verbalized understanding of her husbands medical difficulties and stated she knew that eventually this "type"  of conversation would take place.       We discussed at length the importance of advance directives, explaining that these invasive interventions , specifically CPR and Mechanical ventilatory support , would further deplete any resources patient has left in addition to causing increased suffering and burden to family and patient.  I stated that these directives do NOT mean "do not treat", I assured Mrs. Espinal that medical management would continue despite these directives.   She shared that she is unable to make this decision without the entire family involved.    Patient remains FULL CODE.   Mrs. Espinal states she will reach out to us with any questions, concerns or decisions.  Palliative care will follow up on Monday.

## 2024-08-09 NOTE — PROGRESS NOTE ADULT - PROBLEM SELECTOR PLAN 7
Patient with known pancreatic neuroendocrine tumor and was on lanreotide. Was later on radiotherapy but due to multiple hospitalizations, was unable to receive follow-up doses. Patient did have PET done in May that showed lesions suspicious for mets. CT also in May showed increased hepatic metastases.   - Continue octreotide 250mcg TID Patient with CAD with multiple stents done as recently as 6/2024 but unable to be on DAPT due to persistent GIB.  - At risk for stent closure, but difficult to manage due to persistent GIB and recent hemorrhagic shock with multiple events requiring frequent transfusions in just the past several weeks.  - Continue to hold anti-platelet agent

## 2024-08-09 NOTE — PROGRESS NOTE ADULT - ASSESSMENT
77 yo male with PMH of CAD s/p PCI x3 with most recent stent 6/12/24 on Plavix, chronic Afib on eliquis, orthostatic hypotension on midodrine, PAD, neuroendocrine tumor with RT currently on hold, recent admission for dx cath on 6/24/24 who presented for hypotension, admitted for GIB and hemorrhagic shock. Found to be bleeding from duodenum. Transferred to MICU, on pressors.     1. Pancreatic NET- metastatic   -- was on lanreotide then had POD in liver and started on peptide receptor radiotherapy (PRRT)- typically given every 8 weeks for 4 doses. He received one dose on 10/20/2023 and planned to get his 2nd dose at the end of December however his course was complicated by multiple hospitalizations that were noncancer related (decompensated heart failure).   -- 5/28/24 PET Dotatate (compared to 9/2023): several new somatostatin avid osseous lesions, some faintly sclerotic, suspicious for mets. Increased upper RP nodes, probably metastatic. Decreased intensely tracer avid right supradiaphragmatic and upper abdominal nodes, suspicious for metastasis. Redemonstrated intensely somatostatin avid bilobar hepatic lesions, consistent with metastases again morphologically difficult to delineate.   -- CT reviewed by [facility]: SINCE MAY 8 2024 INCREASED HEPATIC METASTASES, NEWLY SEEN PRIMARY TUMOR IN THE PANCREAS, and active bleeding within the duodenum with associated intraluminal hematoma.   -- chromogranin level is elevated at 2058, but no prior level at [facility] for review   -- f/u with Dr. Sophia Prasad at [facility] after [place], no plan for chemotherapy inpatient, if clinically improves/stabilizes then can be considered for treatment outpatient  --Continue octreotide 250mcg TID    2. Duodenal bleed, Hemorrhagic shock  - Now transferred to medicine from MICU, s/p extubation 7/22. NG tube in place.  - CT w/ bleed in duodenum. GI called, EGD 7/9 -> S/p  IR embolization 7/9, intubated in MICU -> s/p extubation 7/15 -> intubated 7/18  - s/p multiple transfusions, fibrinogen low would recommend Cryo for < 100, but coags have improved as are essentially normal now so unlikely, probably was related to liver dysfunction.   - s/p repeat EGD 7/12 showing duodenal lesions w/clots and oozing, no clear targets for intervention and no active bleeding, purastat applied to all areas of oozing.   - S/p EGD 7/18: duodenal ulcer with active oozing, ulcer with visible vessel. Bleeding treated.   - s/p multiple transfusions, per GI, high risk for rebleed, will continue PPI.   - Repeat CT AP w/ No evidence of GI bleed.  Interval endoscopic versus surgical intervention with metallic streak artifact suggestive of surgical clips in the region of the proximal one third portions of the duodenum. Evaluation of the previously seen hematoma is limited secondary to this artifact. Moderate bilateral pleural effusions and associated compressive atelectasis, right greater than left, overall slightly increased from previous CT. Anasarca.  - Per IR, In the absences of any active extravasation on CTA there's no place to target for an embolization  - 7/29 sputum culture + Pseudomonas; RRT on 8/2 due to tachycardia, hypotension; febrile to 101. Currently on cefepime x 7 days  - Continue octreotide 250 mcg TID  - CT AP 8/4: No evidence of active intraluminal extravasation of contrast. No evidence of acute intraperitoneal or retroperitoneal hemorrhage  - Underwent swallow assessment, started mildly thick liquids  - 8/8 CTA a/p d/t reports of melena, H/H stable--> Limited evaluation for active gastrointestinal hemorrhage due to high density oral contrast within the colon and streak artifact from orthopedic hardware, embolization material, and duodenal clips. Question a focus of enhancement in the duodenum adjacent to the duodenal clips versus streak artifact, with active hemorrhage within the duodenum not excluded.    3. C. diff   - diarrhea resolved, off vanco    Will continue to follow.    Christen Rosales NP  Hematology/ Oncology  [facility]  [phone] (office)  [phone] (alt office)  Evenings and weekends please call MD on call or office

## 2024-08-09 NOTE — PROGRESS NOTE ADULT - PROBLEM SELECTOR PLAN 4
S/P barium swallow with no evidence of aspiration  Puree diet but remains high risk due to repeated intubations and prolonged hospitalization with multiple acute events

## 2024-08-09 NOTE — PROGRESS NOTE ADULT - PROBLEM SELECTOR PLAN 9
Resolved  Patient with C Diff diarrhea and required vanc (treated with 14 day course followed by prophylactic treatment while on meropenem) Patient with T2DM  - SSI

## 2024-08-09 NOTE — PROGRESS NOTE ADULT - SUBJECTIVE AND OBJECTIVE BOX
Patient is a 79y old  Male who presents with a chief complaint of hypotension (09 Aug 2024 10:15)    Patient seen and examined.  Appears comfortable, no new complaints    MEDICATIONS  (STANDING):  albumin human 25% IVPB 100 milliLiter(s) IV Intermittent once  AQUAPHOR (petrolatum Ointment) 1 Application(s) Topical two times a day  chlorhexidine 2% Cloths 1 Application(s) Topical <User Schedule>  dextrose 50% Injectable 25 Gram(s) IV Push once  digoxin     Tablet 125 MICROGram(s) Oral every other day  folic acid Injectable 1 milliGRAM(s) IV Push daily  furosemide   Injectable 40 milliGRAM(s) IV Push once  insulin lispro (ADMELOG) corrective regimen sliding scale   SubCutaneous at bedtime  insulin lispro (ADMELOG) corrective regimen sliding scale   SubCutaneous three times a day before meals  magnesium sulfate  IVPB 1 Gram(s) IV Intermittent once  metoprolol tartrate 12.5 milliGRAM(s) Oral two times a day  midodrine 10 milliGRAM(s) Oral every 8 hours  octreotide  Injectable 250 MICROGram(s) IV Push three times a day  pantoprazole  Injectable 40 milliGRAM(s) IV Push every 12 hours  potassium phosphate / sodium phosphate Powder (PHOS-NaK) 1 Packet(s) Oral once  potassium phosphate IVPB 30 milliMole(s) IV Intermittent once  sodium chloride 3%  Inhalation 4 milliLiter(s) Inhalation every 12 hours  thiamine Injectable 100 milliGRAM(s) IV Push daily    MEDICATIONS  (PRN):      Vital Signs Last 24 Hrs  T(C): 36.1 (09 Aug 2024 12:00), Max: 36.7 (08 Aug 2024 23:40)  T(F): 96.9 (09 Aug 2024 12:00), Max: 98 (08 Aug 2024 23:40)  HR: 93 (09 Aug 2024 12:00) (82 - 106)  BP: 107/69 (09 Aug 2024 12:00) (95/60 - 108/71)  BP(mean): --  RR: 18 (09 Aug 2024 12:00) (18 - 18)  SpO2: 100% (09 Aug 2024 12:00) (98% - 100%)    Parameters below as of 09 Aug 2024 12:00  Patient On (Oxygen Delivery Method): nasal cannula  O2 Flow (L/min): 2      PE  Awake, alert  Anicteric, MMM  RRR  CTAB  Abd soft, NT, ND  No c/c                        8.4    6.81  )-----------( 96       ( 09 Aug 2024 11:03 )             27.4       08-09    143  |  112<H>  |  22  ----------------------------<  117<H>  4.1   |  22  |  0.76    Ca    8.0<L>      09 Aug 2024 11:03  Phos  2.4     08-09  Mg     1.8     08-09    ACC: [id number] EXAM:  CT ANGIO ABD PELV (W)AW IC   ORDERED BY:  HEATHER CALLAHAN     PROCEDURE DATE:  08/08/2024          INTERPRETATION:  CLINICAL INFORMATION: New melena. Metastatic pancreatic   neuroendocrine tumor.    COMPARISON: CT abdomen and pelvis 8/4/2024.    CONTRAST/COMPLICATIONS:  IV Contrast: Omnipaque 350  90 cc administered   10 cc discarded  Oral Contrast: NONE  Complications: None reported at time of study completion    PROCEDURE:  CT of the Abdomen and Pelvis was performed.  Precontrast, Arterial and Delayed phases were performed.  Sagittal and coronal reformats were performed.    FINDINGS:  LOWER CHEST: Moderate bilateral pleural effusions. Complete atelectasis   of the imaged left lower lobe. Partial atelectasis in the left upper and   right lower lobes. Coronary artery and aortic valve calcifications.   Cardiomegaly. Partially imaged soft tissue gas tracking between the left   pectoralis major and minor muscles.    LIVER: Nodular liver contour. Again seen are numerous ill-defined   hypoattenuating lesions throughout the liver, the largest in the right   lobe measuring approximately 7.0 cm.  BILE DUCTS: Normal caliber.  GALLBLADDER: Layering hyperdense material, which may reflect vicarious   excretion of contrast, sludge, or stones.  SPLEEN: Within normal limits.  PANCREAS: Within normal limits.  ADRENALS: Within normal limits.  KIDNEYS/URETERS: No hydronephrosis. Nonobstructing left renal calculus   measuring 5 mm. Bilateral renal cortical thinning. Bilateral hypodense   renal lesions.    BLADDER: Hyperdense fluid within the bladder could reflect renally   excreted contrast. Small amount of gas within the bladder, slightly   decreased since the prior CT. Several bladder stones with example   measuring 3.5 x 2.3 cm.  REPRODUCTIVE ORGANS: The prostate is normal in size and contains coarse   calcification.    BOWEL: Residual high density oral contrast within the colon from recent   fluoroscopic barium swallow study, which markedly limits evaluation of   the colon for active hemorrhage, and also causes streak artifact that   obscures adjacent structures. There is also streak artifact from spinal   hardware, embolization material in the GDA distribution, and clips within   the duodenum, which also limits assessment for active gastrointestinal   hemorrhage. Question a focus of arterial enhancement immediately adjacent   to the duodenal clips (series 6 image 100), without definite pooling   appreciated on the delayed phase. No bowel obstruction.  PERITONEUM/RETROPERITONEUM: Small amount of peritoneal fluid and edema of   the peritoneum.  VESSELS: Atherosclerotic changes. Embolization material in the GDA   distribution.  LYMPH NODES: No lymphadenopathy.  ABDOMINAL WALL: Diffuse subcutaneous edema. Tiny fat-containing umbilical   hernia.  BONES: Right hip arthroplasty with associated streak artifact obscuring   portions of the pelvis. There is also extensive spinal hardware with   streak artifact obscuring adjacent structures. Posterior fusion spanning   T10-S1 with bilateral sacroiliac extension. Interbody fusion at L5-S1.   Multilevel lumbar laminectomies.    IMPRESSION:  *  Limited evaluation for active gastrointestinal hemorrhage due to high   density oral contrast within the colon and streak artifact from   orthopedic hardware, embolization material, and duodenal clips. Question   a focus of enhancement in the duodenum adjacent to the duodenal clips   versus streak artifact, with active hemorrhage within the duodenum not   excluded.  *  Moderate bilateral pleural effusions. Marked bibasilar atelectasis.  *  Partially imaged soft tissue gas tracking between the left pectoralis   major and minor muscles of uncertain etiology.  *  Additional findings as described in the report.    Findings were discussed with Dr. Jodie Okeefe 8/8/2024 3:12 PM by Dr. Almonte with read back confirmation.    --- End of Report ---

## 2024-08-09 NOTE — PROGRESS NOTE ADULT - SUBJECTIVE AND OBJECTIVE BOX
DATE OF SERVICE: 08-09-24 @ 10:16    Patient is a 79y old  Male who presents with a chief complaint of hypotension (09 Aug 2024 07:18)      INTERVAL HISTORY: In no acute distress.     REVIEW OF SYSTEMS:  CONSTITUTIONAL: No weakness  EYES/ENT: No visual changes;  No throat pain   NECK: No pain or stiffness  RESPIRATORY: No cough, wheezing; No shortness of breath  CARDIOVASCULAR: No chest pain or palpitations  GASTROINTESTINAL: No abdominal  pain. No nausea, vomiting, or hematemesis  GENITOURINARY: No dysuria, frequency or hematuria  NEUROLOGICAL: No stroke like symptoms  SKIN: No rashes    TELEMETRY Personally reviewed: AF with 15/24 beat runs of WCT occ PVCs  	  MEDICATIONS:  digoxin     Tablet 125 MICROGram(s) Oral every other day  metoprolol tartrate 12.5 milliGRAM(s) Oral two times a day  midodrine 10 milliGRAM(s) Oral every 8 hours        PHYSICAL EXAM:  T(C): 36.4 (08-09-24 @ 08:48), Max: 36.7 (08-08-24 @ 23:40)  HR: 85 (08-09-24 @ 08:48) (82 - 106)  BP: 101/69 (08-09-24 @ 08:48) (95/60 - 108/71)  RR: 18 (08-09-24 @ 08:48) (18 - 18)  SpO2: 100% (08-09-24 @ 08:48) (98% - 100%)  Wt(kg): --  I&O's Summary    08 Aug 2024 07:01  -  09 Aug 2024 07:00  --------------------------------------------------------  IN: 550 mL / OUT: 0 mL / NET: 550 mL          Appearance: In no distress	  HEENT:    PERRL, EOMI	  Cardiovascular:  S1 S2, No JVD  Respiratory: Lungs clear to auscultation	  Gastrointestinal:  Soft, Non-tender, + BS	  Vascularature:  No edema of LE  Psychiatric: Appropriate affect   Neuro: no acute focal deficits                               8.7    7.95  )-----------( 104      ( 08 Aug 2024 11:08 )             28.4     08-08    142  |  111<H>  |  21  ----------------------------<  119<H>  3.8   |  22  |  0.76    Ca    7.9<L>      08 Aug 2024 11:08  Phos  1.9     08-08  Mg     1.7     08-08          Labs personally reviewed      ASSESSMENT/PLAN: 	        78-year-old male multiple medical problems history of hepatocellular carcinoma status post radiation therapy, AF s/p DCCV on Eliquis who was found to be hypotensive following NST. Patient has reported general weakness, fatigue, lightheadedness since being discharged from hospital. Reports mild chest discomfort in midsternal region. Reports dyspnea with minimal exertion. Also reports significant lack of appetite and poor PO intake. No dark or bloody stool, nausea or vomiting.       Problem/Plan - 1:  ·  Problem: Hypotension  ·  Plan: s/p pressors in MICU.  Now transferred to 4mon  - Patient presents with hypotension and also RAZIA. Has known orthostatic hypotension.   - Patient and wife report decreased PO intake likely 2/2 malignancy.  - GIB 7/9, s/p multiple units prbc, IR for mesenteric embolization; Repeat CT A/P with no extravazation  - c/w Midodrine 10mg PO c1pmwdn  - CT A/P pending given new melena     Problem/Plan - 2:  ·  Problem: CAD.   ·  Plan: Recent PCI to pLAD in June 2023  - ECG non-ischemic  - Plavix held given large melena; ideally should be on Plavix but high risk to resume      Problem/Plan - 3:  ·  Problem: Atrial Fibrillation  - s/p succesful DCCV 10/31  - Hold Eliquis 5mg BID given GIB  - s/p Amio  - c/w Dig 125mcg PO QOD  - Metoprolol 12.5mg BID     Problem/Plan - 4:  ·  Problem: Acute Hypoxic Respiratory Failure  - Now requiring 3L NC  - CT Chest shows B/L pleural effusion and atelectasis  - Check BNP  - Recommend Lasix 40mg IV once with albumin    ROSIO Latham-NP   Hank Hayes DO [facility]  Cardiovascular Medicine  [address], [address]  Available through call or text on [employer]  Office: [phone]

## 2024-08-09 NOTE — PROGRESS NOTE ADULT - PROBLEM SELECTOR PLAN 11
DVT: unable to be on anything but SCDs  Diet: pureed diet, mildly thickened liquids  GOC: Full code, palliative meeting today

## 2024-08-09 NOTE — PROGRESS NOTE ADULT - PROBLEM SELECTOR PLAN 5
Patient with failure to thrive iso metastatic disease as well as repeated intubation and difficulty swallowing so pending barium swallow. Patient this afternoon (8/5) states willingness to try and family is in agreement.  MBS 8/6 showing no gross aspiration, but still high-risk. Recommended pureed diet with mildly thickened liquids and aspiration precautions given patient and family desire PO diet. NG tube removed 8/6 after study.  - Continue diet, to be supplemented with RD recs  - Monitor electrolytes as high risk for refeeding syndrome Patient prolonged hospitalization and now with fevers and tachycardia. Poor cough especially in the setting of repeated intubations and extubations last extubated on 7/28. Not feeling SOB, but will get imaging, cultures, and start abx. UCx 8/2 showing Klebsiella and sensitivity to cefepime. Patient denying dysuria. Recent blood cultures showing no growth.  - S/p cefepime 2g q8 for 7d (8/2-8/8)

## 2024-08-09 NOTE — PROGRESS NOTE ADULT - PROBLEM SELECTOR PLAN 6
Patient with CAD with multiple stents done as recently as 6/2024 but unable to be on DAPT due to persistent GIB.  - At risk for stent closure, but difficult to manage due to persistent GIB and recent hemorrhagic shock with multiple events requiring frequent transfusions in just the past several weeks.  - Continue to hold anti-platelet agent Patient with chronic afib unable to be on AC for this and seemingly minimally responsive to amiodarone. Cardiology following, no obvious underlying cause aside from hemorrhage, which is difficult to control. Most recent digoxin trough low at 0.7.  - Digoxin 125mcg every other day  - Continue current digoxin dose per cardiology  - Holding eliquis

## 2024-08-09 NOTE — PROGRESS NOTE ADULT - ASSESSMENT
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 Transferred to floors with persistent melena and downtrending Hgb; currently no acute surgical intervention recommended.  Palliative care reconsulted for goals of care and adv care planning

## 2024-08-09 NOTE — PROGRESS NOTE ADULT - PROBLEM SELECTOR PLAN 2
Patient with 24 beats wide complex tachycardia for 9.8 seconds and another 15 beats overnight.    Plan:  - Check electrolytes this morning and replete Patient with 24 beats wide complex tachycardia for 9.8 seconds and another 15 beats overnight.    Plan:  - Repleted magnesium and phosphorus today  - Continue to monitor

## 2024-08-09 NOTE — PROGRESS NOTE ADULT - PROBLEM SELECTOR PLAN 10
DVT: unable to be on anything but SCDs  Diet: pureed diet, mildly thickened liquids  GOC: Full code, palliative meeting next week as family stuck in traffic and could not make yesterday's Resolved  Patient with C Diff diarrhea and required vanc (treated with 14 day course followed by prophylactic treatment while on meropenem)

## 2024-08-09 NOTE — PROGRESS NOTE ADULT - PROBLEM SELECTOR PLAN 8
Patient with T2DM  - SSI Patient with known pancreatic neuroendocrine tumor and was on lanreotide. Was later on radiotherapy but due to multiple hospitalizations, was unable to receive follow-up doses. Patient did have PET done in May that showed lesions suspicious for mets. CT also in May showed increased hepatic metastases.   - Continue octreotide 250mcg TID

## 2024-08-10 NOTE — PROGRESS NOTE ADULT - ATTENDING COMMENTS
Weekend Hospitalist coverage    78yo M pmh metastatic neuroendocrine pancreatic cancer, CAD s/p PCI x3 with stents placed on 6/12/24, chronic afib, orthostatic hypotension on midodrine, and PAD admitted 7/2 UGIB and hemorrhagic shock requiring MICU for pressors, s/p IR embolization & duodenal ulcer clip, course complicated by C Diff, afib RVR, and RAZIA iso hypotension, and sepsis due to UTI on abx, concern for recurrent melena 8/7 however did not require transfusion, repeat CTA  likely shows artifact from coils and not active bleeding per GI with No plans for repeat endoscopic evaluation at this time, guarded prognosis palliative family meeting scheduled this afternoon.    -->  sepsis due to UTI: Patient prolonged hospitalization and now with fevers and tachycardia. Poor cough especially in the setting of repeated intubations and extubations last extubated on 7/28. Not feeling SOB, but will get imaging, cultures, and start abx. UCx 8/2 showing gram negative rods. Patient denying dysuria.  -completed cefepime 8/8    -->  Hemorrhagic shock: Patient with persistent UGIB with coffee ground emesis and melanotic stools multiple times requiring IR embolization initially and then EGD with clipping for duodenal ulcer. Patient has been off of his aspirin and plavix for his very recent stent and Eliquis for his chronic afib since he has required so many repeat transfusions due to this active blood loss.    - Hold all AC and anti-platelet agents  - Protonix BID per GI  -appreciate GI c/s.  Per GI no plans for further testing as they suspect CTA was artifact and no active bleeding, and current rectal exam without melena.  - Transfuse Hgb <8--> f/up post transfusion H/H and if not appropriate will discuss with GI or surgery team       Rest as per above   discuss with team     Marium Zaragoza   Hospitalist

## 2024-08-10 NOTE — PROGRESS NOTE ADULT - PROBLEM SELECTOR PLAN 5
Patient prolonged hospitalization and now with fevers and tachycardia. Poor cough especially in the setting of repeated intubations and extubations last extubated on 7/28. Not feeling SOB, but will get imaging, cultures, and start abx. UCx 8/2 showing Klebsiella and sensitivity to cefepime. Patient denying dysuria. Recent blood cultures showing no growth.  - S/p cefepime 2g q8 for 7d (8/2-8/8) Patient with failure to thrive iso metastatic disease as well as repeated intubation and difficulty swallowing so pending barium swallow. Patient this afternoon (8/5) states willingness to try and family is in agreement.  MBS 8/6 showing no gross aspiration, but still high-risk. Recommended pureed diet with mildly thickened liquids and aspiration precautions given patient and family desire PO diet. NG tube removed 8/6 after study.  - Continue diet, to be supplemented with RD recs  - Monitor electrolytes as high risk for refeeding syndrome  - GOC meeting yesterday with family and palliative, patient's wife and daughter to be present; further palliative follow-up on Monday

## 2024-08-10 NOTE — PROGRESS NOTE ADULT - NS ATTEND AMEND GEN_ALL_CORE FT
Off antibiotics. Hgb trending down, no overt bleeding reported. Continue to monitor. Continue with octreotide. Anasarcic, getting lasix with albumin. English

## 2024-08-10 NOTE — PROGRESS NOTE ADULT - PROBLEM SELECTOR PLAN 1
Patient with persistent UGIB with coffee ground emesis and melanotic stools multiple times requiring IR embolization initially and then EGD with clipping for duodenal ulcer. Patient has been off of his aspirin and plavix for his very recent stent and Eliquis for his chronic afib since he has required so many repeat transfusions due to this active blood loss. The hemorrhagic shock seems to have temporarily resolved. Hgb seems to be stable as well. CTAP 8/4 shows no active bleeding, CTAP 8/8 no strong indication of active bleeding.  - Hold all AC and anti-platelet agents  - Maintain T&S (last checked 8/8)  - Checked CBC with concern of dark stools; however, only mild drop from yesterday, VSS, and clinically unchanged  - Continue midodrine 10mg q8; consider increasing if necessary with diuresis  - No acute surgical or GI intervention at this time given previous CTAP findings  - Protonix BID per GI  - Continue CBC q24 unless having active bleeding  - Transfuse Hgb <8  - Can consider iron chelation therapy given many transfusions Patient with persistent UGIB with coffee ground emesis and melanotic stools multiple times requiring IR embolization initially and then EGD with clipping for duodenal ulcer. Patient has been off of his aspirin and plavix for his very recent stent and Eliquis for his chronic afib since he has required so many repeat transfusions due to this active blood loss. The hemorrhagic shock seems to have temporarily resolved. Hgb seems to be stable as well. CTAP 8/4 shows no active bleeding, CTAP 8/8 no strong indication of active bleeding.  - CBC today 7.3, ordered one unit pRBC  - Check CBC post-transfusion, if not adequately responding, contact GI and surgery  - Hold all AC and anti-platelet agents  - Maintain T&S  - Continue midodrine 10mg q8; consider increasing if necessary with diuresis  - Protonix BID per GI  - Continue CBC q24 unless having active bleeding  - Transfuse Hgb <8  - Can consider iron chelation therapy given many transfusions

## 2024-08-10 NOTE — PROGRESS NOTE ADULT - PROBLEM SELECTOR PLAN 6
Patient with chronic afib unable to be on AC for this and seemingly minimally responsive to amiodarone. Cardiology following, no obvious underlying cause aside from hemorrhage, which is difficult to control. Most recent digoxin trough low at 0.7.  - Digoxin 125mcg every other day  - Continue current digoxin dose per cardiology  - Holding eliquis Patient prolonged hospitalization and now with fevers and tachycardia. Poor cough especially in the setting of repeated intubations and extubations last extubated on 7/28. Not feeling SOB, but will get imaging, cultures, and start abx. UCx 8/2 showing Klebsiella and sensitivity to cefepime. Patient denying dysuria. Recent blood cultures showing no growth.  - S/p cefepime 2g q8 for 7d (8/2-8/8)

## 2024-08-10 NOTE — PROGRESS NOTE ADULT - PROBLEM SELECTOR PLAN 4
Patient with failure to thrive iso metastatic disease as well as repeated intubation and difficulty swallowing so pending barium swallow. Patient this afternoon (8/5) states willingness to try and family is in agreement.  MBS 8/6 showing no gross aspiration, but still high-risk. Recommended pureed diet with mildly thickened liquids and aspiration precautions given patient and family desire PO diet. NG tube removed 8/6 after study.  - Continue diet, to be supplemented with RD recs  - Monitor electrolytes as high risk for refeeding syndrome  - GOC meeting today with family and palliative at 2pm, patient's wife and daughter to be present Patient's profound edema likely 2/2 large volume in during this admission as well as malnutrition. Previous POCUS have not identified a cardiac cause.    Plan:  - Consider further diuresis when Hgb >8 and not currently transfusing  - Strict I/Os

## 2024-08-10 NOTE — PROGRESS NOTE ADULT - SUBJECTIVE AND OBJECTIVE BOX
Patient is a 79y old  Male who presents with a chief complaint of hypotension (10 Aug 2024 06:32)    Patient seen and examined  Appears comfortable, no new complaints    MEDICATIONS  (STANDING):  AQUAPHOR (petrolatum Ointment) 1 Application(s) Topical two times a day  chlorhexidine 2% Cloths 1 Application(s) Topical <User Schedule>  dextrose 50% Injectable 25 Gram(s) IV Push once  digoxin     Tablet 125 MICROGram(s) Oral every other day  folic acid Injectable 1 milliGRAM(s) IV Push daily  insulin lispro (ADMELOG) corrective regimen sliding scale   SubCutaneous at bedtime  insulin lispro (ADMELOG) corrective regimen sliding scale   SubCutaneous three times a day before meals  metoprolol tartrate 12.5 milliGRAM(s) Oral two times a day  midodrine 10 milliGRAM(s) Oral every 8 hours  octreotide  Injectable 250 MICROGram(s) IV Push three times a day  pantoprazole  Injectable 40 milliGRAM(s) IV Push every 12 hours  sodium chloride 3%  Inhalation 4 milliLiter(s) Inhalation every 12 hours  thiamine Injectable 100 milliGRAM(s) IV Push daily    MEDICATIONS  (PRN):      Vital Signs Last 24 Hrs  T(C): 36.5 (10 Aug 2024 08:25), Max: 36.8 (09 Aug 2024 13:20)  T(F): 97.7 (10 Aug 2024 08:25), Max: 98.3 (09 Aug 2024 13:20)  HR: 95 (10 Aug 2024 08:25) (86 - 108)  BP: 100/68 (10 Aug 2024 08:25) (100/63 - 134/84)  BP(mean): --  RR: 18 (10 Aug 2024 08:25) (18 - 18)  SpO2: 100% (10 Aug 2024 08:25) (99% - 100%)    Parameters below as of 10 Aug 2024 08:25  Patient On (Oxygen Delivery Method): nasal cannula  O2 Flow (L/min): 2      PE  Anicteric,  RRR  CTAB  anasarca                            7.3    7.27  )-----------( 80       ( 10 Aug 2024 09:57 )             24.0       08-10    142  |  111<H>  |  21  ----------------------------<  147<H>  3.8   |  22  |  0.70    Ca    8.0<L>      10 Aug 2024 09:57  Phos  2.6     08-10  Mg     1.7     08-10

## 2024-08-10 NOTE — PROGRESS NOTE ADULT - PROBLEM SELECTOR PLAN 11
DVT: unable to be on anything but SCDs  Diet: pureed diet, mildly thickened liquids  GOC: Full code, palliative meeting today Resolved  Patient with C Diff diarrhea and required vanc (treated with 14 day course followed by prophylactic treatment while on meropenem)

## 2024-08-10 NOTE — PROGRESS NOTE ADULT - PROBLEM SELECTOR PLAN 3
Patient's profound edema likely 2/2 large volume in during this admission as well as malnutrition. Previous POCUS have not identified a cardiac cause.    Plan:  - Lasix 40mg today with 25% albumin 100 mL, monitoring BPs  - Ordered BNP Patient with episodes of NSVT throughout admission.    Plan:  - Replete electrolytes as necessary  - Continue to monitor

## 2024-08-10 NOTE — PROGRESS NOTE ADULT - PROBLEM SELECTOR PLAN 2
Patient with 24 beats wide complex tachycardia for 9.8 seconds and another 15 beats overnight.    Plan:  - Repleted magnesium and phosphorus today  - Continue to monitor Likely 2/2 to GI bleeding.  Iron 89 (WNL), UIBC 48 (low), TIBC 138 (low), Sat 65% (high), Ferritin 998 (high)    Plan:  - Vitamin B12 ordered  - Haptoglobin ordered

## 2024-08-10 NOTE — PROGRESS NOTE ADULT - ASSESSMENT
79 yo male with PMH of CAD s/p PCI x3 with most recent stent 6/12/24 on Plavix, chronic Afib on eliquis, orthostatic hypotension on midodrine, PAD, neuroendocrine tumor with RT currently on hold, recent admission for dx cath on 6/24/24 who presented for hypotension, admitted for GIB and hemorrhagic shock. Found to be bleeding from duodenum. Transferred to MICU, on pressors.     1. Pancreatic NET- metastatic   -- was on lanreotide then had POD in liver and started on peptide receptor radiotherapy (PRRT)- typically given every 8 weeks for 4 doses. He received one dose on 10/20/2023 and planned to get his 2nd dose at the end of December however his course was complicated by multiple hospitalizations that were noncancer related (decompensated heart failure).   -- 5/28/24 PET Dotatate (compared to 9/2023): several new somatostatin avid osseous lesions, some faintly sclerotic, suspicious for mets. Increased upper RP nodes, probably metastatic. Decreased intensely tracer avid right supradiaphragmatic and upper abdominal nodes, suspicious for metastasis. Redemonstrated intensely somatostatin avid bilobar hepatic lesions, consistent with metastases again morphologically difficult to delineate.   -- CT reviewed by [facility]: SINCE MAY 8 2024 INCREASED HEPATIC METASTASES, NEWLY SEEN PRIMARY TUMOR IN THE PANCREAS, and active bleeding within the duodenum with associated intraluminal hematoma.   -- chromogranin level is elevated at 2058, but no prior level at [facility] for review   -- f/u with Dr. Sophia Prasad at [facility] [place], [place] after discharge, no plan for chemotherapy inpatient, if clinically improves/stabilizes then can be considered for treatment outpatient  --Continue octreotide 250mcg TID    2. Duodenal bleed, Hemorrhagic shock  - Now transferred to medicine from MICU, s/p extubation 7/22. NG tube in place.  - CT w/ bleed in duodenum. GI called, EGD 7/9 -> S/p  IR embolization 7/9, intubated in MICU -> s/p extubation 7/15 -> intubated 7/18  - s/p multiple transfusions, fibrinogen low would recommend Cryo for < 100, but coags have improved as are essentially normal now so unlikely, probably was related to liver dysfunction.   - s/p repeat EGD 7/12 showing duodenal lesions w/clots and oozing, no clear targets for intervention and no active bleeding, purastat applied to all areas of oozing.   - S/p EGD 7/18: duodenal ulcer with active oozing, ulcer with visible vessel. Bleeding treated.   - s/p multiple transfusions, per GI, high risk for rebleed, will continue PPI.   - Repeat CT AP w/ No evidence of GI bleed.  Interval endoscopic versus surgical intervention with metallic streak artifact suggestive of surgical clips in the region of the proximal one third portions of the duodenum. Evaluation of the previously seen hematoma is limited secondary to this artifact. Moderate bilateral pleural effusions and associated compressive atelectasis, right greater than left, overall slightly increased from previous CT. Anasarca.  - Per IR, In the absences of any active extravasation on CTA there's no place to target for an embolization  - 7/29 sputum culture + Pseudomonas; RRT on 8/2 due to tachycardia, hypotension; febrile to 101. Currently on cefepime x 7 days  - Continue octreotide 250 mcg TID  - CT AP 8/4: No evidence of active intraluminal extravasation of contrast. No evidence of acute intraperitoneal or retroperitoneal hemorrhage  - Underwent swallow assessment, started mildly thick liquids  - 8/8 CTA a/p d/t reports of melena, H/H stable--> Limited evaluation for active gastrointestinal hemorrhage due to high density oral contrast within the colon and streak artifact from orthopedic hardware, embolization material, and duodenal clips. Question a focus of enhancement in the duodenum adjacent to the duodenal clips versus streak artifact, with active hemorrhage within the duodenum not excluded.    3. C. diff   - diarrhea resolved, off vanco    Will continue to follow.    Christen Rosales NP  Hematology/ Oncology  [facility]  [phone] (office)  [phone] (alt office)  Evenings and weekends please call MD on call or office

## 2024-08-10 NOTE — PROGRESS NOTE ADULT - SUBJECTIVE AND OBJECTIVE BOX
Patient is a 79y old  Male who presents with a chief complaint of hypotension (05 Aug 2024 04:26)    SUBJECTIVE / OVERNIGHT EVENTS:      MEDICATIONS  (STANDING):  albuterol/ipratropium for Nebulization 3 milliLiter(s) Nebulizer every 6 hours  cefepime   IVPB 2000 milliGRAM(s) IV Intermittent every 8 hours  cefepime   IVPB      dextrose 5% + sodium chloride 0.45%. 1000 milliLiter(s) (100 mL/Hr) IV Continuous <Continuous>  dextrose 50% Injectable 25 Gram(s) IV Push once  digoxin     Tablet 125 MICROGram(s) Oral every other day  folic acid Injectable 1 milliGRAM(s) IV Push daily  insulin lispro (ADMELOG) corrective regimen sliding scale   SubCutaneous every 6 hours  metoprolol tartrate 12.5 milliGRAM(s) Oral two times a day  midodrine 10 milliGRAM(s) Oral every 8 hours  octreotide  Injectable 250 MICROGram(s) IV Push three times a day  pantoprazole  Injectable 40 milliGRAM(s) IV Push every 12 hours  sodium chloride 3%  Inhalation 4 milliLiter(s) Inhalation every 12 hours  thiamine Injectable 100 milliGRAM(s) IV Push daily    MEDICATIONS  (PRN):      POCT Blood Glucose.: 154 mg/dL (05 Aug 2024 06:00)  POCT Blood Glucose.: 121 mg/dL (05 Aug 2024 00:00)  POCT Blood Glucose.: 114 mg/dL (04 Aug 2024 21:42)  POCT Blood Glucose.: 113 mg/dL (04 Aug 2024 18:52)  POCT Blood Glucose.: 118 mg/dL (04 Aug 2024 11:42)  POCT Blood Glucose.: 125 mg/dL (04 Aug 2024 06:18)    I&O's Summary    04 Aug 2024 07:01  -  05 Aug 2024 06:12  --------------------------------------------------------  IN: 500 mL / OUT: 0 mL / NET: 500 mL        Vital Signs Last 24 Hrs  T(C): 36.9 (05 Aug 2024 04:03), Max: 36.9 (05 Aug 2024 04:03)  T(F): 98.5 (05 Aug 2024 04:03), Max: 98.5 (05 Aug 2024 04:03)  HR: 104 (05 Aug 2024 04:03) (90 - 104)  BP: 99/66 (05 Aug 2024 04:03) (98/63 - 102/68)  BP(mean): --  RR: 18 (05 Aug 2024 04:03) (18 - 18)  SpO2: 98% (05 Aug 2024 04:03) (97% - 100%)    Parameters below as of 05 Aug 2024 04:03  Patient On (Oxygen Delivery Method): nasal cannula  O2 Flow (L/min): 2      PHYSICAL EXAM:  GENERAL: NAD, ill-appearing but comfortable, NC in place, sleeping  HEAD: Atraumatic, Normocephalic  EYES: EOMI, conjunctiva and sclera clear  NECK: Supple  CHEST/LUNG: Coarse lung sounds bilaterally but unclear if transmitted upper airway sounds, No wheezes or crackles  HEART: Normal S1/S2; Irregular rhythm; No murmurs, rubs, or gallops  ABDOMEN: Soft, Nontender, edematous  EXTREMITIES: Profound pitting edema in lower extremities until hips, worsened from yesterday, edematous in proximal extremities; pitting is deep but rebounds to quarter of depth within seconds  PSYCH: A&Ox1-2, able to state needs  NEUROLOGY: no focal neurologic deficit    LABS:                        8.4    8.49  )-----------( 109      ( 05 Aug 2024 04:32 )             26.0      08-04    150<H>  |  113<H>  |  26<H>  ----------------------------<  109<H>  3.8   |  24  |  0.83    Ca    7.9<L>      04 Aug 2024 10:23  Phos  2.3     08-04  Mg     1.6     08-04    TPro  4.6<L>  /  Alb  2.4<L>  /  TBili  0.8  /  DBili  x   /  AST  31  /  ALT  18  /  AlkPhos  113  08-04    PT/INR - ( 04 Aug 2024 10:23 )   PT: 12.8 sec;   INR: 1.23 ratio         PTT - ( 04 Aug 2024 10:23 )  PTT:26.9 sec      Urinalysis Basic - ( 04 Aug 2024 10:23 )    Color: x / Appearance: x / SG: x / pH: x  Gluc: 109 mg/dL / Ketone: x  / Bili: x / Urobili: x   Blood: x / Protein: x / Nitrite: x   Leuk Esterase: x / RBC: x / WBC x   Sq Epi: x / Non Sq Epi: x / Bacteria: x     Patient is a 79y old  Male who presents with a chief complaint of hypotension (05 Aug 2024 04:26)    SUBJECTIVE / OVERNIGHT EVENTS:  Overnight patient had 10 beats of WCT. Otherwise, no stools noted overnight. Today, patient appears comfortable without nasal cannula and does not have any complaints. He denies any pain or shortness of breath.    MEDICATIONS  (STANDING):  albuterol/ipratropium for Nebulization 3 milliLiter(s) Nebulizer every 6 hours  cefepime   IVPB 2000 milliGRAM(s) IV Intermittent every 8 hours  cefepime   IVPB      dextrose 5% + sodium chloride 0.45%. 1000 milliLiter(s) (100 mL/Hr) IV Continuous <Continuous>  dextrose 50% Injectable 25 Gram(s) IV Push once  digoxin     Tablet 125 MICROGram(s) Oral every other day  folic acid Injectable 1 milliGRAM(s) IV Push daily  insulin lispro (ADMELOG) corrective regimen sliding scale   SubCutaneous every 6 hours  metoprolol tartrate 12.5 milliGRAM(s) Oral two times a day  midodrine 10 milliGRAM(s) Oral every 8 hours  octreotide  Injectable 250 MICROGram(s) IV Push three times a day  pantoprazole  Injectable 40 milliGRAM(s) IV Push every 12 hours  sodium chloride 3%  Inhalation 4 milliLiter(s) Inhalation every 12 hours  thiamine Injectable 100 milliGRAM(s) IV Push daily    MEDICATIONS  (PRN):      POCT Blood Glucose.: 154 mg/dL (05 Aug 2024 06:00)  POCT Blood Glucose.: 121 mg/dL (05 Aug 2024 00:00)  POCT Blood Glucose.: 114 mg/dL (04 Aug 2024 21:42)  POCT Blood Glucose.: 113 mg/dL (04 Aug 2024 18:52)  POCT Blood Glucose.: 118 mg/dL (04 Aug 2024 11:42)  POCT Blood Glucose.: 125 mg/dL (04 Aug 2024 06:18)    I&O's Summary    04 Aug 2024 07:01  -  05 Aug 2024 06:12  --------------------------------------------------------  IN: 500 mL / OUT: 0 mL / NET: 500 mL        Vital Signs Last 24 Hrs  T(C): 36.9 (05 Aug 2024 04:03), Max: 36.9 (05 Aug 2024 04:03)  T(F): 98.5 (05 Aug 2024 04:03), Max: 98.5 (05 Aug 2024 04:03)  HR: 104 (05 Aug 2024 04:03) (90 - 104)  BP: 99/66 (05 Aug 2024 04:03) (98/63 - 102/68)  BP(mean): --  RR: 18 (05 Aug 2024 04:03) (18 - 18)  SpO2: 98% (05 Aug 2024 04:03) (97% - 100%)    Parameters below as of 05 Aug 2024 04:03  Patient On (Oxygen Delivery Method): nasal cannula  O2 Flow (L/min): 2      PHYSICAL EXAM:  GENERAL: NAD, ill-appearing but comfortable, smiling  HEAD: Atraumatic, Normocephalic  EYES: EOMI, conjunctiva and sclera clear  NECK: Supple  CHEST/LUNG: Clear to auscultation bilaterally in superior chest, improved from prior, breath sounds decreased at bases, No wheezes or crackles  HEART: Normal S1/S2; Irregular rhythm; No murmurs, rubs, or gallops  ABDOMEN: Soft, Nontender, edematous  EXTREMITIES: Profound pitting edema in lower extremities until hips, edematous in proximal extremities; pitting is deep but rebounds to quarter of depth within seconds  PSYCH: A&Ox1-2, able to state basic needs  NEUROLOGY: no focal neurologic deficit    LABS:                        8.4    8.49  )-----------( 109      ( 05 Aug 2024 04:32 )             26.0      08-04    150<H>  |  113<H>  |  26<H>  ----------------------------<  109<H>  3.8   |  24  |  0.83    Ca    7.9<L>      04 Aug 2024 10:23  Phos  2.3     08-04  Mg     1.6     08-04    TPro  4.6<L>  /  Alb  2.4<L>  /  TBili  0.8  /  DBili  x   /  AST  31  /  ALT  18  /  AlkPhos  113  08-04    PT/INR - ( 04 Aug 2024 10:23 )   PT: 12.8 sec;   INR: 1.23 ratio         PTT - ( 04 Aug 2024 10:23 )  PTT:26.9 sec      Urinalysis Basic - ( 04 Aug 2024 10:23 )    Color: x / Appearance: x / SG: x / pH: x  Gluc: 109 mg/dL / Ketone: x  / Bili: x / Urobili: x   Blood: x / Protein: x / Nitrite: x   Leuk Esterase: x / RBC: x / WBC x   Sq Epi: x / Non Sq Epi: x / Bacteria: x

## 2024-08-11 NOTE — PROGRESS NOTE ADULT - PROBLEM SELECTOR PLAN 5
Patient with failure to thrive iso metastatic disease as well as repeated intubation and difficulty swallowing so pending barium swallow. Patient this afternoon (8/5) states willingness to try and family is in agreement.  MBS 8/6 showing no gross aspiration, but still high-risk. Recommended pureed diet with mildly thickened liquids and aspiration precautions given patient and family desire PO diet. NG tube removed 8/6 after study.  - Continue diet, to be supplemented with RD recs  - Monitor electrolytes as high risk for refeeding syndrome  - GOC meeting yesterday with family and palliative, patient's wife and daughter to be present; further palliative follow-up on Monday

## 2024-08-11 NOTE — PROGRESS NOTE ADULT - PROBLEM SELECTOR PLAN 2
Likely 2/2 to GI bleeding.  Iron 89 (WNL), UIBC 48 (low), TIBC 138 (low), Sat 65% (high), Ferritin 998 (high)    Plan:  - Vitamin B12 ordered  - Haptoglobin ordered Likely 2/2 to GI bleeding.  Iron 89 (WNL), UIBC 48 (low), TIBC 138 (low), Sat 65% (high), Ferritin 998 (high)    Plan:  - F/Up  Vitamin B12 ordered  - F/UP Haptoglobin

## 2024-08-11 NOTE — PROGRESS NOTE ADULT - SUBJECTIVE AND OBJECTIVE BOX
Patient is a 79y old  Male who presents with a chief complaint of hypotension (11 Aug 2024 07:21)    Patient seen and examined  Resting in bed, no complaints offered    MEDICATIONS  (STANDING):  AQUAPHOR (petrolatum Ointment) 1 Application(s) Topical two times a day  chlorhexidine 2% Cloths 1 Application(s) Topical <User Schedule>  dextrose 50% Injectable 25 Gram(s) IV Push once  digoxin     Tablet 125 MICROGram(s) Oral every other day  folic acid Injectable 1 milliGRAM(s) IV Push daily  insulin lispro (ADMELOG) corrective regimen sliding scale   SubCutaneous at bedtime  insulin lispro (ADMELOG) corrective regimen sliding scale   SubCutaneous three times a day before meals  magnesium sulfate  IVPB 2 Gram(s) IV Intermittent once  metoprolol tartrate 12.5 milliGRAM(s) Oral two times a day  midodrine 10 milliGRAM(s) Oral every 8 hours  octreotide  Injectable 250 MICROGram(s) IV Push three times a day  pantoprazole  Injectable 40 milliGRAM(s) IV Push every 12 hours  sodium chloride 3%  Inhalation 4 milliLiter(s) Inhalation every 12 hours  thiamine Injectable 100 milliGRAM(s) IV Push daily    MEDICATIONS  (PRN):    Vital Signs Last 24 Hrs  T(C): 36.4 (11 Aug 2024 10:57), Max: 36.8 (11 Aug 2024 00:01)  T(F): 97.5 (11 Aug 2024 10:57), Max: 98.2 (11 Aug 2024 00:01)  HR: 94 (11 Aug 2024 10:57) (90 - 102)  BP: 107/70 (11 Aug 2024 10:57) (102/62 - 107/70)  BP(mean): --  RR: 18 (11 Aug 2024 10:57) (18 - 18)  SpO2: 95% (11 Aug 2024 10:57) (95% - 100%)    Parameters below as of 11 Aug 2024 10:57  Patient On (Oxygen Delivery Method): nasal cannula  O2 Flow (L/min): 2      PE  Awake, alert  Anicteric, MMM  RRR  2L NC  Abd soft, NT, ND  No c/c                        8.0    6.59  )-----------( 82       ( 11 Aug 2024 07:20 )             25.2       08-11    144  |  112<H>  |  22  ----------------------------<  120<H>  4.1   |  22  |  0.73    Ca    7.9<L>      11 Aug 2024 07:20  Phos  2.6     08-11  Mg     1.7     08-11    TPro  x   /  Alb  2.4<L>  /  TBili  x   /  DBili  x   /  AST  x   /  ALT  x   /  AlkPhos  x   08-11

## 2024-08-11 NOTE — PROGRESS NOTE ADULT - SUBJECTIVE AND OBJECTIVE BOX
Medicine Progress Note      Patient is a 79y old  Male who presents with a chief complaint of hypotension (10 Aug 2024 10:34)      SUBJECTIVE / OVERNIGHT EVENTS: This AM, patient seen and examined at bedside.      MEDICATIONS  (STANDING):  AQUAPHOR (petrolatum Ointment) 1 Application(s) Topical two times a day  chlorhexidine 2% Cloths 1 Application(s) Topical <User Schedule>  dextrose 50% Injectable 25 Gram(s) IV Push once  digoxin     Tablet 125 MICROGram(s) Oral every other day  folic acid Injectable 1 milliGRAM(s) IV Push daily  insulin lispro (ADMELOG) corrective regimen sliding scale   SubCutaneous three times a day before meals  insulin lispro (ADMELOG) corrective regimen sliding scale   SubCutaneous at bedtime  metoprolol tartrate 12.5 milliGRAM(s) Oral two times a day  midodrine 10 milliGRAM(s) Oral every 8 hours  octreotide  Injectable 250 MICROGram(s) IV Push three times a day  pantoprazole  Injectable 40 milliGRAM(s) IV Push every 12 hours  sodium chloride 3%  Inhalation 4 milliLiter(s) Inhalation every 12 hours  thiamine Injectable 100 milliGRAM(s) IV Push daily    MEDICATIONS  (PRN):    CAPILLARY BLOOD GLUCOSE      POCT Blood Glucose.: 128 mg/dL (10 Aug 2024 21:24)  POCT Blood Glucose.: 119 mg/dL (10 Aug 2024 17:48)  POCT Blood Glucose.: 159 mg/dL (10 Aug 2024 11:34)  POCT Blood Glucose.: 132 mg/dL (10 Aug 2024 07:43)    I&O's Summary    10 Aug 2024 07:01  -  11 Aug 2024 07:00  --------------------------------------------------------  IN: 360 mL / OUT: 1 mL / NET: 359 mL        PHYSICAL EXAM:  Vital Signs Last 24 Hrs  T(C): 36.7 (11 Aug 2024 04:25), Max: 36.8 (11 Aug 2024 00:01)  T(F): 98 (11 Aug 2024 04:25), Max: 98.2 (11 Aug 2024 00:01)  HR: 96 (11 Aug 2024 04:25) (90 - 102)  BP: 106/64 (11 Aug 2024 04:25) (100/68 - 106/64)  BP(mean): --  RR: 18 (11 Aug 2024 04:25) (18 - 18)  SpO2: 96% (11 Aug 2024 04:25) (95% - 100%)    Parameters below as of 11 Aug 2024 04:25  Patient On (Oxygen Delivery Method): nasal cannula  O2 Flow (L/min): 2    CONSTITUTIONAL: NAD  HEENT: Moist oral mucosa  RESPIRATORY: Normal respiratory effort; lungs are clear to auscultation bilaterally  CARDIOVASCULAR: Regular rate and rhythm, normal S1 and S2, no murmur/rub/gallop, no lower extremity edema, peripheral pulses are palpable bilaterally  ABDOMEN: Soft, nondistended, nontender to palpation, normoactive bowel sounds, no rebound/guarding  PSYCH: A+O to person, place, and time  NEUROLOGY: Facial expression appears symmetric, no gross sensory deficits appreciated, moving all extremities spontaneously  SKIN: No rashes; no palpable lesions    LABS:                        8.6    8.03  )-----------( 91       ( 10 Aug 2024 17:52 )             27.5     08-10    142  |  111<H>  |  21  ----------------------------<  147<H>  3.8   |  22  |  0.70    Ca    8.0<L>      10 Aug 2024 09:57  Phos  2.6     08-10  Mg     1.7     08-10              Urinalysis Basic - ( 10 Aug 2024 09:57 )    Color: x / Appearance: x / SG: x / pH: x  Gluc: 147 mg/dL / Ketone: x  / Bili: x / Urobili: x   Blood: x / Protein: x / Nitrite: x   Leuk Esterase: x / RBC: x / WBC x   Sq Epi: x / Non Sq Epi: x / Bacteria: x        SARS-CoV-2: NotDetec (11 Jul 2024 04:36)      RADIOLOGY & ADDITIONAL TESTS:  Imaging from Last 24 Hours: Medicine Progress Note      Patient is a 79y old  Male who presents with a chief complaint of hypotension (10 Aug 2024 10:34)      SUBJECTIVE / OVERNIGHT EVENTS: This AM, patient seen and examined at bedside.      MEDICATIONS  (STANDING):  AQUAPHOR (petrolatum Ointment) 1 Application(s) Topical two times a day  chlorhexidine 2% Cloths 1 Application(s) Topical <User Schedule>  dextrose 50% Injectable 25 Gram(s) IV Push once  digoxin     Tablet 125 MICROGram(s) Oral every other day  folic acid Injectable 1 milliGRAM(s) IV Push daily  insulin lispro (ADMELOG) corrective regimen sliding scale   SubCutaneous three times a day before meals  insulin lispro (ADMELOG) corrective regimen sliding scale   SubCutaneous at bedtime  metoprolol tartrate 12.5 milliGRAM(s) Oral two times a day  midodrine 10 milliGRAM(s) Oral every 8 hours  octreotide  Injectable 250 MICROGram(s) IV Push three times a day  pantoprazole  Injectable 40 milliGRAM(s) IV Push every 12 hours  sodium chloride 3%  Inhalation 4 milliLiter(s) Inhalation every 12 hours  thiamine Injectable 100 milliGRAM(s) IV Push daily    MEDICATIONS  (PRN):    CAPILLARY BLOOD GLUCOSE      POCT Blood Glucose.: 128 mg/dL (10 Aug 2024 21:24)  POCT Blood Glucose.: 119 mg/dL (10 Aug 2024 17:48)  POCT Blood Glucose.: 159 mg/dL (10 Aug 2024 11:34)  POCT Blood Glucose.: 132 mg/dL (10 Aug 2024 07:43)    I&O's Summary    10 Aug 2024 07:01  -  11 Aug 2024 07:00  --------------------------------------------------------  IN: 360 mL / OUT: 1 mL / NET: 359 mL        PHYSICAL EXAM:  Vital Signs Last 24 Hrs  T(C): 36.7 (11 Aug 2024 04:25), Max: 36.8 (11 Aug 2024 00:01)  T(F): 98 (11 Aug 2024 04:25), Max: 98.2 (11 Aug 2024 00:01)  HR: 96 (11 Aug 2024 04:25) (90 - 102)  BP: 106/64 (11 Aug 2024 04:25) (100/68 - 106/64)  BP(mean): --  RR: 18 (11 Aug 2024 04:25) (18 - 18)  SpO2: 96% (11 Aug 2024 04:25) (95% - 100%)    Parameters below as of 11 Aug 2024 04:25  Patient On (Oxygen Delivery Method): nasal cannula  O2 Flow (L/min): 2    CONSTITUTIONAL: NAD  HEENT: Moist oral mucosa  RESPIRATORY: Normal respiratory effort; lungs are clear to auscultation bilaterally  CARDIOVASCULAR: Regular rate and rhythm, normal S1 and S2, no murmur/rub/gallop, no lower extremity edema, peripheral pulses are palpable bilaterally  ABDOMEN: Soft, nondistended, Non tender to palpation, normoactive bowel sounds  NEUROLOGY: Facial expression appears symmetric, no gross sensory deficits appreciated, moving all extremities spontaneously      LABS:                        8.6    8.03  )-----------( 91       ( 10 Aug 2024 17:52 )             27.5     08-10    142  |  111<H>  |  21  ----------------------------<  147<H>  3.8   |  22  |  0.70    Ca    8.0<L>      10 Aug 2024 09:57  Phos  2.6     08-10  Mg     1.7     08-10              Urinalysis Basic - ( 10 Aug 2024 09:57 )    Color: x / Appearance: x / SG: x / pH: x  Gluc: 147 mg/dL / Ketone: x  / Bili: x / Urobili: x   Blood: x / Protein: x / Nitrite: x   Leuk Esterase: x / RBC: x / WBC x   Sq Epi: x / Non Sq Epi: x / Bacteria: x        SARS-CoV-2: NotDetec (11 Jul 2024 04:36)      RADIOLOGY & ADDITIONAL TESTS:  Imaging from Last 24 Hours:

## 2024-08-11 NOTE — PROGRESS NOTE ADULT - PROBLEM SELECTOR PLAN 3
Patient with episodes of NSVT throughout admission.    Plan:  - Replete electrolytes as necessary  - Continue to monitor

## 2024-08-11 NOTE — PROGRESS NOTE ADULT - ATTENDING COMMENTS
Weekend Hospitalist coverage     78yo M pmh metastatic neuroendocrine pancreatic cancer, CAD s/p PCI x3 with stents placed on 6/12/24, chronic afib, orthostatic hypotension on midodrine, and PAD admitted 7/2 UGIB and hemorrhagic shock requiring MICU for pressors, s/p IR embolization & duodenal ulcer clip, course complicated by C Diff, afib RVR, and RAZIA iso hypotension, and sepsis due to UTI  s/p abx, concern for recurrent melena 8/7 however did not require transfusion, repeat CTA  likely shows artifact from coils and not active bleeding per GI with No plans for repeat endoscopic evaluation at this time, guarded prognosis and palliative  care team is following     --> S/P  Hemorrhagic shock: Patient had  persistent UGIB with coffee ground emesis and melanotic stools multiple times requiring IR embolization     initially and then EGD with clipping for duodenal ulcer. Patient has been off of his aspirin and plavix for his very recent stent and Eliquis for his     chronic afib since he has required so many repeat transfusions due to this active blood loss.        Hold all AC and anti-platelet agents now        CW Protonix BID per GI, appreciate GI c/s.  Per GI no plans for further testing as they suspect CTA was artifact and no active bleeding, and current     rectal exam without melena.       S/P PRBC Transfusion on 8/10 due to  Hgb <8 with stable response     --> S/P sepsis due to UTI: Patient prolonged hospitalization , Poor cough especially in the setting of repeated intubations and extubations last                 extubated on 7/28.   Pt completed cefepime 8/8    --> Chronic atrial fibrillation: unable to be on AC for this and seemingly minimally responsive to amiodarone. Cardiology following, no obvious     underlying cause aside from hemorrhage, which is difficult to control.       Continue digoxin per cardiology       Digoxin 125 EOD--> f/up repeat Dig levels in AM        Holding eliquis.  -->  CAD: multiple stents done as recently as 6/2024 but unable to be on DAPT due to persistent GIB.        At risk for stent closure, but difficult to manage due to persistent GIB and recent hemorrhagic shock with multiple events requiring frequent     transfusions in just the past several weeks.      Per report, prior trial of asa 81mg led to mass transfusion needs and had recurrent melena 8/7 off DAPT.    --> Anasarca      patient anasarca, suspect combination of volume overload from transfusions and low albumin.      limited diuresis 2/2 low BPs on midodrine.      appreciate cards c/s: attempting albumin assisted diuresis   rest as per above   discuss with team       Marium Zaragoza   Hospitalist Weekend Hospitalist coverage     78yo M pmh metastatic neuroendocrine pancreatic cancer, CAD s/p PCI x3 with stents placed on 6/12/24, chronic afib, orthostatic hypotension on midodrine, and PAD admitted 7/2 UGIB and hemorrhagic shock requiring MICU for pressors, s/p IR embolization & duodenal ulcer clip, course complicated by C Diff, afib RVR, and RAZIA iso hypotension, and sepsis due to UTI  s/p abx, concern for recurrent melena 8/7 however did not require transfusion, repeat CTA  likely shows artifact from coils and not active bleeding per GI with No plans for repeat endoscopic evaluation at this time, guarded prognosis and palliative  care team is following     --> S/P  Hemorrhagic shock: Patient had  persistent UGIB with coffee ground emesis and melanotic stools multiple times requiring IR embolization     initially and then EGD with clipping for duodenal ulcer. Patient has been off of his aspirin and plavix for his very recent stent and Eliquis for his     chronic afib since he has required so many repeat transfusions due to this active blood loss.        Hold all AC and anti-platelet agents now        CW Protonix BID per GI, appreciate GI c/s.  Per GI no plans for further testing as they suspect CTA was artifact and no active bleeding, and current     rectal exam without melena.       S/P one unit of  PRBC Transfusion on 8/10 due to  Hgb <8 with stable response     --> S/P sepsis due to UTI: Patient prolonged hospitalization , Poor cough especially in the setting of repeated intubations and extubations last                 extubated on 7/28.   Pt completed cefepime 8/8    --> Chronic atrial fibrillation: unable to be on AC for this and seemingly minimally responsive to amiodarone. Cardiology following, no obvious     underlying cause aside from hemorrhage, which is difficult to control.       Continue digoxin per cardiology       Digoxin 125 EOD--> f/up repeat Dig levels in AM        Holding eliquis.  -->  CAD: multiple stents done as recently as 6/2024 but unable to be on DAPT due to persistent GIB.        At risk for stent closure, but difficult to manage due to persistent GIB and recent hemorrhagic shock with multiple events requiring frequent     transfusions in just the past several weeks.      Per report, prior trial of asa 81mg led to mass transfusion needs and had recurrent melena 8/7 off DAPT.    --> Anasarca      patient anasarca, suspect combination of volume overload from transfusions and low albumin.      limited diuresis 2/2 low BPs on midodrine.      appreciate cards c/s: attempting albumin assisted diuresis once appropriate    rest as per above   discuss with team       Marium Zaragoza   Hospitalist

## 2024-08-11 NOTE — PROGRESS NOTE ADULT - ASSESSMENT
77 yo male with PMH of CAD s/p PCI x3 with most recent stent 6/12/24 on Plavix, chronic Afib on eliquis, orthostatic hypotension on midodrine, PAD, neuroendocrine tumor with RT currently on hold, recent admission for dx cath on 6/24/24 who presented for hypotension, admitted for GIB and hemorrhagic shock. Found to be bleeding from duodenum. Transferred to MICU, on pressors.     1. Pancreatic NET- metastatic   -- was on lanreotide then had POD in liver and started on peptide receptor radiotherapy (PRRT)- typically given every 8 weeks for 4 doses. He received one dose on 10/20/2023 and planned to get his 2nd dose at the end of December however his course was complicated by multiple hospitalizations that were noncancer related (decompensated heart failure).   -- 5/28/24 PET Dotatate (compared to 9/2023): several new somatostatin avid osseous lesions, some faintly sclerotic, suspicious for mets. Increased upper RP nodes, probably metastatic. Decreased intensely tracer avid right supradiaphragmatic and upper abdominal nodes, suspicious for metastasis. Redemonstrated intensely somatostatin avid bilobar hepatic lesions, consistent with metastases again morphologically difficult to delineate.   -- CT reviewed by [facility]: SINCE MAY 8 2024 INCREASED HEPATIC METASTASES, NEWLY SEEN PRIMARY TUMOR IN THE PANCREAS, and active bleeding within the duodenum with associated intraluminal hematoma.   -- chromogranin level is elevated at 2058, but [place] [facility] [place]   -- [place] with [place]. Sophia Prasad [place] [facility] [place], no plan for chemotherapy inpatient, if clinically improves/stabilizes then can be considered for treatment outpatient  --Continue octreotide 250mcg TID    2. Duodenal bleed, Hemorrhagic shock  - Now transferred to medicine from MICU, s/p extubation 7/22. NG tube in place.  - CT w/ bleed in duodenum. GI called, EGD 7/9 -> S/p  IR embolization 7/9, intubated in MICU -> s/p extubation 7/15 -> intubated 7/18  - s/p multiple transfusions, fibrinogen low would recommend Cryo for < 100, but coags have improved as are essentially normal now so unlikely, probably was related to liver dysfunction.   - s/p repeat EGD 7/12 showing duodenal lesions w/clots and oozing, no clear targets for intervention and no active bleeding, purastat applied to all areas of oozing.   - S/p EGD 7/18: duodenal ulcer with active oozing, ulcer with visible vessel. Bleeding treated.   - s/p multiple transfusions, per GI, high risk for rebleed, will continue PPI.   - Repeat CT AP w/ No evidence of GI bleed.  Interval endoscopic versus surgical intervention with metallic streak artifact suggestive of surgical clips in the region of the proximal one third portions of the duodenum. Evaluation of the previously seen hematoma is limited secondary to this artifact. Moderate bilateral pleural effusions and associated compressive atelectasis, right greater than left, overall slightly increased from previous CT. Anasarca.  - Per IR, In the absences of any active extravasation on CTA there's no place to target for an embolization  - 7/29 sputum culture + Pseudomonas; RRT on 8/2 due to tachycardia, hypotension; febrile to 101. Currently on cefepime x 7 days  - Continue octreotide 250 mcg TID  - CT AP 8/4: No evidence of active intraluminal extravasation of contrast. No evidence of acute intraperitoneal or retroperitoneal hemorrhage  - Underwent swallow assessment, started mildly thick liquids  - 8/8 CTA a/p d/t reports of melena, H/H stable--> Limited evaluation for active gastrointestinal hemorrhage due to high density oral contrast within the colon and streak artifact from orthopedic hardware, embolization material, and duodenal clips. Question a focus of enhancement in the duodenum adjacent to the duodenal clips versus streak artifact, with active hemorrhage within the duodenum not excluded.    3. C. diff   - diarrhea resolved, off vanco    Family meeting with palliative care team on 8/12    Will continue to follow.    Christen Rosales NP  Hematology/ Oncology  [facility]  [phone] (office)  [phone] (alt office)  Evenings and weekends please call MD on call or office

## 2024-08-11 NOTE — PROGRESS NOTE ADULT - PROBLEM SELECTOR PLAN 1
Patient with persistent UGIB with coffee ground emesis and melanotic stools multiple times requiring IR embolization initially and then EGD with clipping for duodenal ulcer. Patient has been off of his aspirin and plavix for his very recent stent and Eliquis for his chronic afib since he has required so many repeat transfusions due to this active blood loss. The hemorrhagic shock seems to have temporarily resolved. Hgb seems to be stable as well. CTAP 8/4 shows no active bleeding, CTAP 8/8 no strong indication of active bleeding.  - CBC today 7.3, ordered one unit pRBC  - Check CBC post-transfusion, if not adequately responding, contact GI and surgery  - Hold all AC and anti-platelet agents  - Maintain T&S  - Continue midodrine 10mg q8; consider increasing if necessary with diuresis  - Protonix BID per GI  - Continue CBC q24 unless having active bleeding  - Transfuse Hgb <8  - Can consider iron chelation therapy given many transfusions Patient with persistent UGIB with coffee ground emesis and melanotic stools multiple times requiring IR embolization initially and then EGD with clipping for duodenal ulcer. Patient has been off of his aspirin and plavix for his very recent stent and Eliquis for his chronic afib since he has required so many repeat transfusions due to this active blood loss. The hemorrhagic shock seems to have temporarily resolved. Hgb seems to be stable as well. CTAP 8/4 shows no active bleeding, CTAP 8/8 no strong indication of active bleeding.  - S/P one unit pRBC on 8/10 with stable response   - Hold all AC and anti-platelet agents  - Maintain T&S  - Continue midodrine 10mg q8; consider increasing if necessary with diuresis  - Protonix BID per GI  - Continue CBC q24 unless having active bleeding  - Transfuse Hgb <8  - Can consider iron chelation therapy given many transfusions

## 2024-08-11 NOTE — PROGRESS NOTE ADULT - PROBLEM SELECTOR PLAN 6
Patient prolonged hospitalization and now with fevers and tachycardia. Poor cough especially in the setting of repeated intubations and extubations last extubated on 7/28. Not feeling SOB, but will get imaging, cultures, and start abx. UCx 8/2 showing Klebsiella and sensitivity to cefepime. Patient denying dysuria. Recent blood cultures showing no growth.  - S/p cefepime 2g q8 for 7d (8/2-8/8)

## 2024-08-11 NOTE — PROGRESS NOTE ADULT - PROBLEM SELECTOR PLAN 4
Patient's profound edema likely 2/2 large volume in during this admission as well as malnutrition. Previous POCUS have not identified a cardiac cause.    Plan:  - Consider further diuresis when Hgb >8 and not currently transfusing  - Strict I/Os

## 2024-08-12 NOTE — PROGRESS NOTE ADULT - PROBLEM SELECTOR PLAN 8
Patient with CAD with multiple stents done as recently as 6/2024 but unable to be on DAPT due to persistent GIB.  - At risk for stent closure, but difficult to manage due to persistent GIB and recent hemorrhagic shock with multiple events requiring frequent transfusions in just the past several weeks.  - Continue to hold anti-platelet agent Patient with known pancreatic neuroendocrine tumor and was on lanreotide. Was later on radiotherapy but due to multiple hospitalizations, was unable to receive follow-up doses. Patient did have PET done in May that showed lesions suspicious for mets. CT also in May showed increased hepatic metastases.   - Continue octreotide 250mcg TID

## 2024-08-12 NOTE — PROGRESS NOTE ADULT - PROBLEM SELECTOR PLAN 12
DVT: unable to be on anything but SCDs  Diet: pureed diet, mildly thickened liquids  GOC: Full code, palliative followup today DVT: unable to be on anything but SCDs  Diet: pureed diet, mildly thickened liquids  GOC: Full code, palliative followup

## 2024-08-12 NOTE — PROGRESS NOTE ADULT - SUBJECTIVE AND OBJECTIVE BOX
Medicine Progress Note      Patient is a 79y old  Male who presents with a chief complaint of hypotension (10 Aug 2024 10:34)      SUBJECTIVE / OVERNIGHT EVENTS:         MEDICATIONS  (STANDING):  AQUAPHOR (petrolatum Ointment) 1 Application(s) Topical two times a day  chlorhexidine 2% Cloths 1 Application(s) Topical <User Schedule>  dextrose 50% Injectable 25 Gram(s) IV Push once  digoxin     Tablet 125 MICROGram(s) Oral every other day  folic acid Injectable 1 milliGRAM(s) IV Push daily  insulin lispro (ADMELOG) corrective regimen sliding scale   SubCutaneous three times a day before meals  insulin lispro (ADMELOG) corrective regimen sliding scale   SubCutaneous at bedtime  metoprolol tartrate 12.5 milliGRAM(s) Oral two times a day  midodrine 10 milliGRAM(s) Oral every 8 hours  octreotide  Injectable 250 MICROGram(s) IV Push three times a day  pantoprazole  Injectable 40 milliGRAM(s) IV Push every 12 hours  sodium chloride 3%  Inhalation 4 milliLiter(s) Inhalation every 12 hours  thiamine Injectable 100 milliGRAM(s) IV Push daily    MEDICATIONS  (PRN):    CAPILLARY BLOOD GLUCOSE      POCT Blood Glucose.: 128 mg/dL (10 Aug 2024 21:24)  POCT Blood Glucose.: 119 mg/dL (10 Aug 2024 17:48)  POCT Blood Glucose.: 159 mg/dL (10 Aug 2024 11:34)  POCT Blood Glucose.: 132 mg/dL (10 Aug 2024 07:43)    I&O's Summary    10 Aug 2024 07:01  -  11 Aug 2024 07:00  --------------------------------------------------------  IN: 360 mL / OUT: 1 mL / NET: 359 mL        PHYSICAL EXAM:  Vital Signs Last 24 Hrs  T(C): 36.7 (11 Aug 2024 04:25), Max: 36.8 (11 Aug 2024 00:01)  T(F): 98 (11 Aug 2024 04:25), Max: 98.2 (11 Aug 2024 00:01)  HR: 96 (11 Aug 2024 04:25) (90 - 102)  BP: 106/64 (11 Aug 2024 04:25) (100/68 - 106/64)  BP(mean): --  RR: 18 (11 Aug 2024 04:25) (18 - 18)  SpO2: 96% (11 Aug 2024 04:25) (95% - 100%)    Parameters below as of 11 Aug 2024 04:25  Patient On (Oxygen Delivery Method): nasal cannula  O2 Flow (L/min): 2    PHYSICAL EXAM:  GENERAL: NAD, ill-appearing but comfortable, smiling  HEAD: Atraumatic, Normocephalic  EYES: EOMI, conjunctiva and sclera clear  NECK: Supple  CHEST/LUNG: Clear to auscultation bilaterally in superior chest, improved from prior, breath sounds decreased at bases, No wheezes or crackles  HEART: Normal S1/S2; Irregular rhythm; No murmurs, rubs, or gallops  ABDOMEN: Soft, Nontender, edematous  EXTREMITIES: Profound pitting edema in lower extremities until hips, edematous in proximal extremities; pitting is deep but rebounds to quarter of depth within seconds  PSYCH: A&Ox1-2, able to state basic needs  NEUROLOGY: no focal neurologic deficit      LABS:                        8.6    8.03  )-----------( 91       ( 10 Aug 2024 17:52 )             27.5     08-10    142  |  111<H>  |  21  ----------------------------<  147<H>  3.8   |  22  |  0.70    Ca    8.0<L>      10 Aug 2024 09:57  Phos  2.6     08-10  Mg     1.7     08-10              Urinalysis Basic - ( 10 Aug 2024 09:57 )    Color: x / Appearance: x / SG: x / pH: x  Gluc: 147 mg/dL / Ketone: x  / Bili: x / Urobili: x   Blood: x / Protein: x / Nitrite: x   Leuk Esterase: x / RBC: x / WBC x   Sq Epi: x / Non Sq Epi: x / Bacteria: x        SARS-CoV-2: NotDetec (11 Jul 2024 04:36)      RADIOLOGY & ADDITIONAL TESTS:  Imaging from Last 24 Hours: Medicine Progress Note      Patient is a 79y old  Male who presents with a chief complaint of hypotension (10 Aug 2024 10:34)      SUBJECTIVE / OVERNIGHT EVENTS:   Overnight, patient had small dark stool per nursing but was lighter than prior. Also had some episodes of tachycardia to 130s.     Today, patient is uncomfortable because of his positioning. Readjusted his position and provided some water. He denied any abdominal pain. He states that his last BM was yesterday afternoon. Patient revisited and more comfortable appearing.     MEDICATIONS  (STANDING):  AQUAPHOR (petrolatum Ointment) 1 Application(s) Topical two times a day  chlorhexidine 2% Cloths 1 Application(s) Topical <User Schedule>  dextrose 50% Injectable 25 Gram(s) IV Push once  digoxin     Tablet 125 MICROGram(s) Oral every other day  folic acid Injectable 1 milliGRAM(s) IV Push daily  insulin lispro (ADMELOG) corrective regimen sliding scale   SubCutaneous three times a day before meals  insulin lispro (ADMELOG) corrective regimen sliding scale   SubCutaneous at bedtime  metoprolol tartrate 12.5 milliGRAM(s) Oral two times a day  midodrine 10 milliGRAM(s) Oral every 8 hours  octreotide  Injectable 250 MICROGram(s) IV Push three times a day  pantoprazole  Injectable 40 milliGRAM(s) IV Push every 12 hours  sodium chloride 3%  Inhalation 4 milliLiter(s) Inhalation every 12 hours  thiamine Injectable 100 milliGRAM(s) IV Push daily    MEDICATIONS  (PRN):    CAPILLARY BLOOD GLUCOSE      POCT Blood Glucose.: 128 mg/dL (10 Aug 2024 21:24)  POCT Blood Glucose.: 119 mg/dL (10 Aug 2024 17:48)  POCT Blood Glucose.: 159 mg/dL (10 Aug 2024 11:34)  POCT Blood Glucose.: 132 mg/dL (10 Aug 2024 07:43)    I&O's Summary    10 Aug 2024 07:01  -  11 Aug 2024 07:00  --------------------------------------------------------  IN: 360 mL / OUT: 1 mL / NET: 359 mL        PHYSICAL EXAM:  Vital Signs Last 24 Hrs  T(C): 36.7 (11 Aug 2024 04:25), Max: 36.8 (11 Aug 2024 00:01)  T(F): 98 (11 Aug 2024 04:25), Max: 98.2 (11 Aug 2024 00:01)  HR: 96 (11 Aug 2024 04:25) (90 - 102)  BP: 106/64 (11 Aug 2024 04:25) (100/68 - 106/64)  BP(mean): --  RR: 18 (11 Aug 2024 04:25) (18 - 18)  SpO2: 96% (11 Aug 2024 04:25) (95% - 100%)    Parameters below as of 11 Aug 2024 04:25  Patient On (Oxygen Delivery Method): nasal cannula  O2 Flow (L/min): 2    PHYSICAL EXAM:  GENERAL: NAD, ill-appearing but comfortable, smiling  HEAD: Atraumatic, Normocephalic  EYES: EOMI, conjunctiva and sclera clear  NECK: Supple  CHEST/LUNG: Clear to auscultation bilaterally in superior chest, improved from prior, breath sounds decreased at bases, No wheezes or crackles  HEART: Normal S1/S2; Irregular rhythm; No murmurs, rubs, or gallops  ABDOMEN: Soft, Nontender, edematous  EXTREMITIES: Profound pitting edema in lower extremities until hips, edematous in proximal extremities; pitting is deep but rebounds to quarter of depth within seconds  PSYCH: A&Ox1-2, able to state basic needs  NEUROLOGY: no focal neurologic deficit      LABS:                        8.6    8.03  )-----------( 91       ( 10 Aug 2024 17:52 )             27.5     08-10    142  |  111<H>  |  21  ----------------------------<  147<H>  3.8   |  22  |  0.70    Ca    8.0<L>      10 Aug 2024 09:57  Phos  2.6     08-10  Mg     1.7     08-10              Urinalysis Basic - ( 10 Aug 2024 09:57 )    Color: x / Appearance: x / SG: x / pH: x  Gluc: 147 mg/dL / Ketone: x  / Bili: x / Urobili: x   Blood: x / Protein: x / Nitrite: x   Leuk Esterase: x / RBC: x / WBC x   Sq Epi: x / Non Sq Epi: x / Bacteria: x        SARS-CoV-2: NotDetec (11 Jul 2024 04:36)      RADIOLOGY & ADDITIONAL TESTS:  Imaging from Last 24 Hours: Medicine Progress Note      Patient is a 79y old  Male who presents with a chief complaint of hypotension (10 Aug 2024 10:34)      SUBJECTIVE / OVERNIGHT EVENTS:   Overnight, patient had small dark stool per nursing but was lighter than prior. Also had some episodes of tachycardia to 130s.     Today, patient is uncomfortable because of his positioning. Readjusted his position and provided some water. He denied any abdominal pain. He states that his last BM was yesterday afternoon. Patient revisited in the afternoon and was more comfortable appearing.     Attempted to reach daughter to provide update. Will stop by bedside later today when family is usually present or call again if not present.    MEDICATIONS  (STANDING):  AQUAPHOR (petrolatum Ointment) 1 Application(s) Topical two times a day  chlorhexidine 2% Cloths 1 Application(s) Topical <User Schedule>  dextrose 50% Injectable 25 Gram(s) IV Push once  digoxin     Tablet 125 MICROGram(s) Oral every other day  folic acid Injectable 1 milliGRAM(s) IV Push daily  insulin lispro (ADMELOG) corrective regimen sliding scale   SubCutaneous three times a day before meals  insulin lispro (ADMELOG) corrective regimen sliding scale   SubCutaneous at bedtime  metoprolol tartrate 12.5 milliGRAM(s) Oral two times a day  midodrine 10 milliGRAM(s) Oral every 8 hours  octreotide  Injectable 250 MICROGram(s) IV Push three times a day  pantoprazole  Injectable 40 milliGRAM(s) IV Push every 12 hours  sodium chloride 3%  Inhalation 4 milliLiter(s) Inhalation every 12 hours  thiamine Injectable 100 milliGRAM(s) IV Push daily    MEDICATIONS  (PRN):    CAPILLARY BLOOD GLUCOSE      POCT Blood Glucose.: 128 mg/dL (10 Aug 2024 21:24)  POCT Blood Glucose.: 119 mg/dL (10 Aug 2024 17:48)  POCT Blood Glucose.: 159 mg/dL (10 Aug 2024 11:34)  POCT Blood Glucose.: 132 mg/dL (10 Aug 2024 07:43)    I&O's Summary    10 Aug 2024 07:01  -  11 Aug 2024 07:00  --------------------------------------------------------  IN: 360 mL / OUT: 1 mL / NET: 359 mL        PHYSICAL EXAM:  Vital Signs Last 24 Hrs  T(C): 36.7 (11 Aug 2024 04:25), Max: 36.8 (11 Aug 2024 00:01)  T(F): 98 (11 Aug 2024 04:25), Max: 98.2 (11 Aug 2024 00:01)  HR: 96 (11 Aug 2024 04:25) (90 - 102)  BP: 106/64 (11 Aug 2024 04:25) (100/68 - 106/64)  BP(mean): --  RR: 18 (11 Aug 2024 04:25) (18 - 18)  SpO2: 96% (11 Aug 2024 04:25) (95% - 100%)    Parameters below as of 11 Aug 2024 04:25  Patient On (Oxygen Delivery Method): nasal cannula  O2 Flow (L/min): 2    PHYSICAL EXAM:  GENERAL: NAD, ill-appearing, uncomfortable-appearing, has NC properly in place, cough  HEAD: Atraumatic, Normocephalic  EYES: EOMI, conjunctiva and sclera clear  NECK: Supple  CHEST/LUNG: Clear to auscultation bilaterally in anterior chest, No wheezes or crackles  HEART: Normal S1/S2; Irregular rhythm; No murmurs, rubs, or gallops  ABDOMEN: Soft, Nontender, edematous  EXTREMITIES: Profound pitting edema in lower extremities until hips, edematous in proximal extremities; pitting is deep but rebounds to quarter of depth within seconds  PSYCH: A&Ox1-2, able to state basic needs, intermittently not oriented to place or time  NEUROLOGY: no focal neurologic deficit      LABS:                        8.6    8.03  )-----------( 91       ( 10 Aug 2024 17:52 )             27.5     08-10    142  |  111<H>  |  21  ----------------------------<  147<H>  3.8   |  22  |  0.70    Ca    8.0<L>      10 Aug 2024 09:57  Phos  2.6     08-10  Mg     1.7     08-10              Urinalysis Basic - ( 10 Aug 2024 09:57 )    Color: x / Appearance: x / SG: x / pH: x  Gluc: 147 mg/dL / Ketone: x  / Bili: x / Urobili: x   Blood: x / Protein: x / Nitrite: x   Leuk Esterase: x / RBC: x / WBC x   Sq Epi: x / Non Sq Epi: x / Bacteria: x        SARS-CoV-2: NotDetec (11 Jul 2024 04:36)      RADIOLOGY & ADDITIONAL TESTS:  Imaging from Last 24 Hours: Medicine Progress Note      Patient is a 79y old  Male who presents with a chief complaint of hypotension (10 Aug 2024 10:34)      SUBJECTIVE / OVERNIGHT EVENTS:   Overnight, patient had small dark stool per nursing but was lighter than prior. Also had some episodes of tachycardia to 130s.     Today, patient is uncomfortable because of his positioning. Readjusted his position. He denied any abdominal pain. He states that his last BM was yesterday afternoon. He does not recall if it was bloody. Patient revisited in the afternoon and was more comfortable appearing while eating.    Attempted to call daughter to provide update. Will stop by bedside later today when family is usually present or call again if not present.    MEDICATIONS  (STANDING):  AQUAPHOR (petrolatum Ointment) 1 Application(s) Topical two times a day  chlorhexidine 2% Cloths 1 Application(s) Topical <User Schedule>  dextrose 50% Injectable 25 Gram(s) IV Push once  digoxin     Tablet 125 MICROGram(s) Oral every other day  folic acid Injectable 1 milliGRAM(s) IV Push daily  insulin lispro (ADMELOG) corrective regimen sliding scale   SubCutaneous three times a day before meals  insulin lispro (ADMELOG) corrective regimen sliding scale   SubCutaneous at bedtime  metoprolol tartrate 12.5 milliGRAM(s) Oral two times a day  midodrine 10 milliGRAM(s) Oral every 8 hours  octreotide  Injectable 250 MICROGram(s) IV Push three times a day  pantoprazole  Injectable 40 milliGRAM(s) IV Push every 12 hours  sodium chloride 3%  Inhalation 4 milliLiter(s) Inhalation every 12 hours  thiamine Injectable 100 milliGRAM(s) IV Push daily    MEDICATIONS  (PRN):    CAPILLARY BLOOD GLUCOSE      POCT Blood Glucose.: 128 mg/dL (10 Aug 2024 21:24)  POCT Blood Glucose.: 119 mg/dL (10 Aug 2024 17:48)  POCT Blood Glucose.: 159 mg/dL (10 Aug 2024 11:34)  POCT Blood Glucose.: 132 mg/dL (10 Aug 2024 07:43)    I&O's Summary    10 Aug 2024 07:01  -  11 Aug 2024 07:00  --------------------------------------------------------  IN: 360 mL / OUT: 1 mL / NET: 359 mL        PHYSICAL EXAM:  Vital Signs Last 24 Hrs  T(C): 36.7 (11 Aug 2024 04:25), Max: 36.8 (11 Aug 2024 00:01)  T(F): 98 (11 Aug 2024 04:25), Max: 98.2 (11 Aug 2024 00:01)  HR: 96 (11 Aug 2024 04:25) (90 - 102)  BP: 106/64 (11 Aug 2024 04:25) (100/68 - 106/64)  BP(mean): --  RR: 18 (11 Aug 2024 04:25) (18 - 18)  SpO2: 96% (11 Aug 2024 04:25) (95% - 100%)    Parameters below as of 11 Aug 2024 04:25  Patient On (Oxygen Delivery Method): nasal cannula  O2 Flow (L/min): 2    PHYSICAL EXAM:  GENERAL: NAD, ill-appearing, uncomfortable-appearing, has NC properly in place, cough  HEAD: Atraumatic, Normocephalic  EYES: EOMI, conjunctiva and sclera clear  NECK: Supple  CHEST/LUNG: Clear to auscultation bilaterally in anterior chest, No wheezes or crackles  HEART: Normal S1/S2; Irregular rhythm; No murmurs, rubs, or gallops  ABDOMEN: Soft, Nontender, edematous  EXTREMITIES: Profound pitting edema in lower extremities until hips, edematous in proximal extremities; pitting is deep but rebounds to quarter of depth within seconds  PSYCH: A&Ox1-2, able to state basic needs, intermittently not oriented to place or time  NEUROLOGY: no focal neurologic deficit      LABS:                        8.6    8.03  )-----------( 91       ( 10 Aug 2024 17:52 )             27.5     08-10    142  |  111<H>  |  21  ----------------------------<  147<H>  3.8   |  22  |  0.70    Ca    8.0<L>      10 Aug 2024 09:57  Phos  2.6     08-10  Mg     1.7     08-10              Urinalysis Basic - ( 10 Aug 2024 09:57 )    Color: x / Appearance: x / SG: x / pH: x  Gluc: 147 mg/dL / Ketone: x  / Bili: x / Urobili: x   Blood: x / Protein: x / Nitrite: x   Leuk Esterase: x / RBC: x / WBC x   Sq Epi: x / Non Sq Epi: x / Bacteria: x        SARS-CoV-2: NotDetec (11 Jul 2024 04:36)      RADIOLOGY & ADDITIONAL TESTS:  Imaging from Last 24 Hours:

## 2024-08-12 NOTE — PROGRESS NOTE ADULT - CONVERSATION DETAILS
Spoke with family via phone. Reviewed previous GOC meeting last week with Andreina. Yamilet stated she felt she was very clear with the goals to continue aggressive interventions and for the patient to remain full code. She shared she would like her entire family to be a part of the next family meeting so everyone is aware of the situation. I attempted to schedule a family meeting, but Yamilet expressed family is out of town. She inquired if the meeting needed to be this week. I continued to explain the tenuous status of Holly, and overall concerns in the event he has a repeat bleed or a catastrophic event requiring intubation and CPR it is important we understand the plan of care and for the family to understand the seriousness of his illness. Yamilet shared she will speak with her family to find a time/date that works for them in order to continue GOC discussions.

## 2024-08-12 NOTE — PROGRESS NOTE ADULT - PROBLEM SELECTOR PLAN 9
Hospice vs [facility]  will continue to follow  case discussed with primary team  Can be reached by TEAMS M-F 9-5 Marium Weiner Any other time please page [phone] if needed

## 2024-08-12 NOTE — REVIEW OF SYSTEMS
Incoming call from [facility], requesting orders for electric wheel chair. Patient chart indication referral to occupational health. Instructed to schedule with them first for assessment and help getting the wheel chair. Phone number provided. Understanding verbalized. Kailash Roque RN, BSN     [Negative] : Allergic/Immunologic [FreeTextEntry7] : diarrhea has subsided. [FreeTextEntry9] : weakness and pain in back due to prior surgeries. [de-identified] : ankle swelling

## 2024-08-12 NOTE — PROGRESS NOTE ADULT - ASSESSMENT
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 Transferred to floors with persistent melena and downtrending Hgb; currently no acute surgical intervention recommended. 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 Transferred to floors with intermittent melena and Hgb drops; currently no acute surgical or GI intervention recommended. GOC discussions ongoing with palliative.

## 2024-08-12 NOTE — PROGRESS NOTE ADULT - PROBLEM SELECTOR PLAN 5
Patient with failure to thrive iso metastatic disease as well as repeated intubation and difficulty swallowing so pending barium swallow. Patient this afternoon (8/5) states willingness to try and family is in agreement.  MBS 8/6 showing no gross aspiration, but still high-risk. Recommended pureed diet with mildly thickened liquids and aspiration precautions given patient and family desire PO diet. NG tube removed 8/6 after study.  - Continue diet, to be supplemented with RD recs  - Monitor electrolytes as high risk for refeeding syndrome  - GOC meeting with family (wife, daughter) and palliative last week; further palliative follow-up on Monday Likely 2/2 to GI bleeding.  Iron 89 (WNL), UIBC 48 (low), TIBC 138 (low), Sat 65% (high), Ferritin 998 (high)  Vitamin B12 WNL, Haptoglobin WNL      Plan:  - Continue to monitor

## 2024-08-12 NOTE — PROGRESS NOTE ADULT - PROBLEM SELECTOR PLAN 2
Likely 2/2 to GI bleeding.  Iron 89 (WNL), UIBC 48 (low), TIBC 138 (low), Sat 65% (high), Ferritin 998 (high)  Vitamin B12 WNL, Haptoglobin WNL      Plan:  - Continue to monitor Patient's profound edema likely 2/2 large volume in during this admission as well as malnutrition. Previous POCUS have not identified a cardiac cause.    Plan:  - Start Albumin 25% 50mL q8hr for 3 doses today  - Bumex 2mg IV given today, consider further diuresis based on response and patient clinical status  - Strict I/Os

## 2024-08-12 NOTE — PROGRESS NOTE ADULT - PROBLEM SELECTOR PLAN 10
Patient with T2DM  - SSI Resolved  Patient prolonged hospitalization and now with fevers and tachycardia. Poor cough especially in the setting of repeated intubations and extubations last extubated on 7/28. Not feeling SOB, but will get imaging, cultures, and start abx. UCx 8/2 showing Klebsiella and sensitivity to cefepime. Patient denied dysuria. Blood cultures showing no growth.  - S/p cefepime 2g q8 for 7d (8/2-8/8)

## 2024-08-12 NOTE — PROGRESS NOTE ADULT - SUBJECTIVE AND OBJECTIVE BOX
SUBJECTIVE AND OBJECTIVE: pt seen and examined at bedside. pt sleeping initially awakens to voice. Pt deferring to wife for further GOC discussion  Indication for Geriatrics and Palliative Care Services/INTERVAL HPI: GOC     OVERNIGHT EVENTS: Pt receiving 1u PRBC. No acute events o/n     DNR on chart:  Allergies    No Known Allergies    Intolerances    MEDICATIONS  (STANDING):  albumin human 25% IVPB 50 milliLiter(s) IV Intermittent every 8 hours  AQUAPHOR (petrolatum Ointment) 1 Application(s) Topical two times a day  chlorhexidine 2% Cloths 1 Application(s) Topical <User Schedule>  dextrose 50% Injectable 25 Gram(s) IV Push once  digoxin     Tablet 125 MICROGram(s) Oral every other day  folic acid Injectable 1 milliGRAM(s) IV Push daily  insulin lispro (ADMELOG) corrective regimen sliding scale   SubCutaneous at bedtime  insulin lispro (ADMELOG) corrective regimen sliding scale   SubCutaneous three times a day before meals  metoprolol tartrate 12.5 milliGRAM(s) Oral two times a day  midodrine 10 milliGRAM(s) Oral every 8 hours  octreotide  Injectable 250 MICROGram(s) IV Push three times a day  pantoprazole  Injectable 40 milliGRAM(s) IV Push every 12 hours  sodium chloride 3%  Inhalation 4 milliLiter(s) Inhalation every 12 hours  thiamine Injectable 100 milliGRAM(s) IV Push daily    MEDICATIONS  (PRN):      ITEMS UNCHECKED ARE NOT PRESENT    PRESENT SYMPTOMS: [ ]Unable to self-report - see [ ] CPOT [ ] PAINADS [ ] RDOS  Source if other than patient:  [ ]Family   [ ]Team     Pain:  [ ]yes [ x]no  QOL impact -   Location -                    Aggravating factors -  Quality -  Radiation -  Timing-  Severity (0-10 scale):  Minimal acceptable level (0-10 scale):     CPOT:    https://www.sccm.org/getattachment/vxz79k97-9l4p-8m1g-8q4v-1279s1093z0j/Critical-Care-Pain-Observation-Tool-(CPOT)    PAINAD Score: See PAINAD tool and score below       Dyspnea:                           [ ]Mild [ ]Moderate [ ]Severe    RDOS: See RDOS tool and score below   0 to 2  minimal or no respiratory distress   3  mild distress  4 to 6 moderate distress  >7 severe distress      Anxiety:                             [ ]Mild [ ]Moderate [ ]Severe  Fatigue:                             [ ]Mild [x ]Moderate [ ]Severe  Nausea:                             [ ]Mild [ ]Moderate [ ]Severe  Loss of appetite:              [ ]Mild [ ]Moderate [ ]Severe  Constipation:                    [ ]Mild [ ]Moderate [ ]Severe    PCSSQ[Palliative Care Spiritual Screening Question]   Severity (0-10):  Score of 4 or > indicate consideration of Chaplaincy referral.  Chaplaincy Referral: [x yes [ ] refused [ x] following [ ] Deferred     Caregiver Heaters? : [ ] yes [ ] no [ ] Deferred [ ] Declined             Social work referral [ ] Patient & Family Centered Care Referral [ ]     Anticipatory Grief present?:  [ ] yes [ ] no  [ ] Deferred                  Social work referral [ ] Chaplaincy Referral [ ]    		  Other Symptoms:  [x ]All other review of systems negative     Palliative Performance Status Version 2:   See PPSv2 tool and score below         PHYSICAL EXAM:  Vital Signs Last 24 Hrs  T(C): 36.7 (12 Aug 2024 11:00), Max: 36.7 (12 Aug 2024 04:00)  T(F): 98 (12 Aug 2024 11:00), Max: 98.1 (12 Aug 2024 04:00)  HR: 111 (12 Aug 2024 11:00) (79 - 111)  BP: 103/69 (12 Aug 2024 11:00) (98/66 - 105/67)  BP(mean): --  RR: 19 (12 Aug 2024 11:00) (18 - 19)  SpO2: 95% (12 Aug 2024 11:00) (95% - 100%)    Parameters below as of 12 Aug 2024 11:00  Patient On (Oxygen Delivery Method): nasal cannula  O2 Flow (L/min): 2   I&O's Summary    11 Aug 2024 07:01  -  12 Aug 2024 07:00  --------------------------------------------------------  IN: 120 mL / OUT: 0 mL / NET: 120 mL       GENERAL: [ ]Cachexia    [ ]Alert  [ ]Oriented x   [ x]Lethargic  [ ]Unarousable  [x ]Verbal  [ ]Non-Verbal  Behavioral:   [ ]Anxiety  [ ]Delirium [ ]Agitation [ ]Other  HEENT:  [ ]Normal   [x ]Dry mouth   [ ]ET Tube/Trach  [ ]Oral lesions  PULMONARY:   [ ]Clear [ ]Tachypnea  [ ]Audible excessive secretions   [ ]Rhonchi        [ ]Right [ ]Left [ ]Bilateral  [ ]Crackles        [ ]Right [ ]Left [ ]Bilateral  [ ]Wheezing     [ ]Right [ ]Left [ ]Bilateral  [x ]Diminished BS [ ] Right [ ]Left [x ]Bilateral  CARDIOVASCULAR:    [ x]Regular [ ]Irregular [ ]Tachy  [ ]Lavelle [ ]Murmur [ ]Other  GASTROINTESTINAL:  [ x]Soft  [ ]Distended   [x ]+BS  [ ]Non tender [ ]Tender  [ ]Other [ ]PEG [ ]OGT/ NGT   Last BM:   GENITOURINARY:  [ ]Normal [ x]Incontinent   [ ]Oliguria/Anuria   [ ]Ivory  MUSCULOSKELETAL:   [ ]Normal   [x ]Weakness  [x ]Bed/Wheelchair bound [ ]Edema  NEUROLOGIC:   [ ]No focal deficits  [x ] Cognitive impairment  [ ] Dysphagia [ ]Dysarthria [ ] Paresis [ ]Other   SKIN:   [ ]Normal  [ ]Rash  [ ]Other  [ ]Pressure ulcer(s) [ ]y [ ]n present on admission    CRITICAL CARE:  [ ]Shock Present  [ ]Septic [ ]Cardiogenic [ ]Neurologic [ ]Hypovolemic  [ ]Vasopressors [ ]Inotropes  [ ]Respiratory failure present [ ]Mechanical Ventilation [ ]Non-invasive ventilatory support [ ]High-Flow   [ ]Acute  [ ]Chronic [ ]Hypoxic  [ ]Hypercarbic [ ]Other  [ ]Other organ failure     LABS:                        7.6    7.86  )-----------( 97       ( 12 Aug 2024 07:30 )             24.3   08-12    142  |  111<H>  |  22  ----------------------------<  118<H>  4.3   |  22  |  0.76    Ca    7.9<L>      12 Aug 2024 07:30  Phos  2.3     08-12  Mg     1.9     08-12    TPro  x   /  Alb  2.4<L>  /  TBili  x   /  DBili  x   /  AST  x   /  ALT  x   /  AlkPhos  x   08-11      Urinalysis Basic - ( 12 Aug 2024 07:30 )    Color: x / Appearance: x / SG: x / pH: x  Gluc: 118 mg/dL / Ketone: x  / Bili: x / Urobili: x   Blood: x / Protein: x / Nitrite: x   Leuk Esterase: x / RBC: x / WBC x   Sq Epi: x / Non Sq Epi: x / Bacteria: x      RADIOLOGY & ADDITIONAL STUDIES:  < from: CT Angio Abdomen and Pelvis w/ IV Cont (08.08.24 @ 14:03) >    ACC: [id number] EXAM:  CT ANGIO ABD PELV (W)AW IC   ORDERED BY:  HEATHER CALLAHAN     PROCEDURE DATE:  08/08/2024          INTERPRETATION:  CLINICAL INFORMATION: New melena. Metastatic pancreatic   neuroendocrine tumor.    COMPARISON: CT abdomen and pelvis8/4/2024.    CONTRAST/COMPLICATIONS:  IV Contrast: Omnipaque 350  90 cc administered   10 cc discarded  Oral Contrast: NONE  Complications: None reported at time of study completion    PROCEDURE:  CT of the Abdomen and Pelvis was performed.  Precontrast, Arterial and Delayed phases were performed.  Sagittal and coronal reformats were performed.    FINDINGS:  LOWER CHEST: Moderate bilateral pleural effusions. Complete atelectasis   of the imaged left lower lobe. Partial atelectasis in the left upper and   right lower lobes. Coronary artery and aortic valve calcifications.   Cardiomegaly. Partially imaged soft tissue gas tracking between the left   pectoralis major and minor muscles.    LIVER: Nodular liver contour. Again seen are numerous ill-defined   hypoattenuating lesions throughout the liver, the largest in the right   lobe measuring approximately 7.0 cm.  BILE DUCTS: Normal caliber.  GALLBLADDER: Layering hyperdense material, which may reflect vicarious   excretion of contrast, sludge, or stones.  SPLEEN: Within normal limits.  PANCREAS: Within normal limits.  ADRENALS: Within normal limits.  KIDNEYS/URETERS: No hydronephrosis. Nonobstructing left renal calculus   measuring 5 mm. Bilateral renal cortical thinning. Bilateral hypodense   renal lesions.    BLADDER: Hyperdense fluid within the bladder could reflect renally   excreted contrast. Small amount of gas within the bladder, slightly   decreased since the prior CT. Several bladder stones with example   measuring 3.5 x 2.3cm.  REPRODUCTIVE ORGANS: The prostate is normal in size and contains coarse   calcification.    BOWEL: Residual high density oral contrast within the colon from recent   fluoroscopic barium swallow study, which markedly limits evaluation of   the colon for active hemorrhage, and also causes streak artifact that   obscures adjacent structures. There is also streak artifact from spinal   hardware, embolization material in the GDA distribution, and clips within   the duodenum, which also limits assessment for active gastrointestinal   hemorrhage. Question a focus of arterial enhancement immediately adjacent   to the duodenal clips (series 6 image 100), without definite pooling   appreciated on the delayed phase. No bowel obstruction.  PERITONEUM/RETROPERITONEUM: Small amount of peritoneal fluid and edema of   the peritoneum.  VESSELS: Atherosclerotic changes. Embolization material in the GDA   distribution.  LYMPH NODES: No lymphadenopathy.  ABDOMINAL WALL: Diffuse subcutaneous edema. Tiny fat-containing umbilical   hernia.  BONES: Right hip arthroplasty with associated streak artifact obscuring   portions of the pelvis. There is also extensive spinal hardware with   streak artifact obscuring adjacent structures. Posterior fusion spanning   T10-S1 with bilateral sacroiliac extension. Interbody fusion at L5-S1.   Multilevel lumbar laminectomies.    IMPRESSION:  *  Limited evaluation for active gastrointestinal hemorrhage due to high   density oral contrast within the colon and streakartifact from   orthopedic hardware, embolization material, and duodenal clips. Question   a focus of enhancement in the duodenum adjacent to the duodenal clips   versus streak artifact, with active hemorrhage within the duodenum not   excluded.  *  Moderate bilateral pleural effusions. Marked bibasilar atelectasis.  *  Partially imaged soft tissue gas tracking between the left pectoralis   major and minor muscles of uncertain etiology.  *  Additional findings as described in the report.    Findings were discussed with Dr. Jodie Okeefe 8/8/2024 3:12 PM by Dr. Almonte with read back confirmation.    --- End of Report ---            DEBBY ALMONTE MD; Attending Radiologist  This document has been electronically signed. Aug  8 2024  3:16PM    < end of copied text >    Protein Calorie Malnutrition Present: [ ]mild [ ]moderate [ ]severe [ ]underweight [ ]morbid obesity  https://www.andeal.org/vault/2440/web/files/ONC/Table_Clinical%20Characteristics%20to%20Document%20Malnutrition-White%20JV%20et%20al%202012.pdf    Height (cm): 188 (07-09-24 @ 13:27), 188 (06-24-24 @ 13:43), 188 (06-12-24 @ 11:58)  Weight (kg): 125.6 (07-21-24 @ 00:00), 102.5 (06-24-24 @ 13:43), 102.1 (06-12-24 @ 11:58)  BMI (kg/m2): 35.5 (07-21-24 @ 00:00), 29 (07-09-24 @ 13:27), 29 (06-24-24 @ 13:43)    [x ]PPSV2 < or = 30%  [ ]significant weight loss [ ]poor nutritional intake [ ]anasarca[ ]Artificial Nutrition    Other REFERRALS:  [ ]Hospice  [ ]Child Life  [ ]Social Work  [ ]Case management [ ]Holistic Therapy     Goals of Care Document:

## 2024-08-12 NOTE — PROGRESS NOTE ADULT - PROBLEM SELECTOR PLAN 6
Patient prolonged hospitalization and now with fevers and tachycardia. Poor cough especially in the setting of repeated intubations and extubations last extubated on 7/28. Not feeling SOB, but will get imaging, cultures, and start abx. UCx 8/2 showing Klebsiella and sensitivity to cefepime. Patient denying dysuria. Recent blood cultures showing no growth.  - S/p cefepime 2g q8 for 7d (8/2-8/8) Patient with episodes of NSVT throughout admission.    Plan:  - Replete electrolytes as necessary  - Continue to monitor

## 2024-08-12 NOTE — PROGRESS NOTE ADULT - ASSESSMENT
79 yo male with PMH of CAD s/p PCI x3 with most recent stent 6/12/24 on Plavix, chronic Afib on eliquis, orthostatic hypotension on midodrine, PAD, neuroendocrine tumor with RT currently on hold, recent admission for dx cath on 6/24/24 who presented for hypotension, admitted for GIB and hemorrhagic shock. Found to be bleeding from duodenum. Transferred to MICU, on pressors.     1. Pancreatic NET- metastatic   -- was on lanreotide then had POD in liver and started on peptide receptor radiotherapy (PRRT)- typically given every 8 weeks for 4 doses. He received one dose on 10/20/2023 and planned to get his 2nd dose at the end of December however his course was complicated by multiple hospitalizations that were noncancer related (decompensated heart failure).   -- 5/28/24 PET Dotatate (compared to 9/2023): several new somatostatin avid osseous lesions, some faintly sclerotic, suspicious for mets. Increased upper RP nodes, probably metastatic. Decreased intensely tracer avid right supradiaphragmatic and upper abdominal nodes, suspicious for metastasis. Redemonstrated intensely somatostatin avid bilobar hepatic lesions, consistent with metastases again morphologically difficult to delineate.   -- CT reviewed by [facility]: SINCE MAY 8 2024 INCREASED HEPATIC METASTASES, NEWLY SEEN PRIMARY TUMOR IN THE PANCREAS, and active bleeding within the duodenum with associated intraluminal hematoma.   -- chromogranin level is elevated at 2058, but no prior level at [facility] for review   -- f/u with Dr. Sophia Prasad at [facility] [place] after discharge, no plan for chemotherapy inpatient, if clinically improves/stabilizes then can be considered for treatment outpatient  --Continue octreotide 250mcg TID    2. Duodenal bleed, Hemorrhagic shock  - Now transferred to medicine from MICU, s/p extubation 7/22. NG tube in place.  - CT w/ bleed in duodenum. GI called, EGD 7/9 -> S/p  IR embolization 7/9, intubated in MICU -> s/p extubation 7/15 -> intubated 7/18  - s/p multiple transfusions, fibrinogen low would recommend Cryo for < 100, but coags have improved as are essentially normal now so unlikely, probably was related to liver dysfunction.   - s/p repeat EGD 7/12 showing duodenal lesions w/clots and oozing, no clear targets for intervention and no active bleeding, purastat applied to all areas of oozing.   - S/p EGD 7/18: duodenal ulcer with active oozing, ulcer with visible vessel. Bleeding treated.   - s/p multiple transfusions, per GI, high risk for rebleed, will continue PPI.   - Repeat CT AP w/ No evidence of GI bleed.  Interval endoscopic versus surgical intervention with metallic streak artifact suggestive of surgical clips in the region of the proximal one third portions of the duodenum. Evaluation of the previously seen hematoma is limited secondary to this artifact. Moderate bilateral pleural effusions and associated compressive atelectasis, right greater than left, overall slightly increased from previous CT. Anasarca.  - Per IR, In the absences of any active extravasation on CTA there's no place to target for an embolization  - 7/29 sputum culture + Pseudomonas; RRT on 8/2 due to tachycardia, hypotension; febrile to 101. Currently on cefepime x 7 days  - Continue octreotide 250 mcg TID  - CT AP 8/4: No evidence of active intraluminal extravasation of contrast. No evidence of acute intraperitoneal or retroperitoneal hemorrhage  - Underwent swallow assessment, started mildly thick liquids  - 8/8 CTA a/p d/t reports of melena, H/H stable--> Limited evaluation for active gastrointestinal hemorrhage due to high density oral contrast within the colon and streak artifact from orthopedic hardware, embolization material, and duodenal clips. Question a focus of enhancement in the duodenum adjacent to the duodenal clips versus streak artifact, with active hemorrhage within the duodenum not excluded.    3. C. diff   - diarrhea resolved, off vanco    Scheduled family meeting with palliative care team later today    Will continue to follow.    Christen Rosales NP  Hematology/ Oncology  [facility]  [phone] (office)  [phone] (alt office)  Evenings and weekends please call MD on call or office

## 2024-08-12 NOTE — PROGRESS NOTE ADULT - PROBLEM SELECTOR PLAN 9
Patient with known pancreatic neuroendocrine tumor and was on lanreotide. Was later on radiotherapy but due to multiple hospitalizations, was unable to receive follow-up doses. Patient did have PET done in May that showed lesions suspicious for mets. CT also in May showed increased hepatic metastases.   - Continue octreotide 250mcg TID Patient with T2DM  - SSI

## 2024-08-12 NOTE — PROGRESS NOTE ADULT - PROBLEM SELECTOR PLAN 1
Patient with persistent UGIB with coffee ground emesis and melanotic stools multiple times requiring IR embolization initially and then EGD with clipping for duodenal ulcer. Patient has been off of his aspirin and plavix for his very recent stent and Eliquis for his chronic afib since he has required so many repeat transfusions due to this active blood loss. The hemorrhagic shock seems to have temporarily resolved. Hgb seems to be stable as well. CTAP 8/4 shows no active bleeding, CTAP 8/8 no strong indication of active bleeding.  - S/P one unit pRBC on 8/10 with stable response   - Hold all AC and anti-platelet agents  - Continue midodrine 10mg q8; consider increasing if necessary with diuresis  - Protonix BID per GI  - Continue CBC q24 unless having active bleeding  - Maintain T&S  - Transfuse Hgb <8  - Can consider iron chelation therapy given many transfusions Patient with persistent UGIB with coffee ground emesis and melanotic stools multiple times requiring IR GDA embolization initially and then EGD s/p 3 clips for duodenal ulcer. Patient has been off of his aspirin and plavix for his very recent stent and Eliquis for his chronic afib since he has required so many repeat transfusions due to this active blood loss. The hemorrhagic shock seems to have temporarily resolved. Patient's Hgb continues to have intermittent drops requiring single transfusions of pRBCs. CTAP 8/4 and 8/8 showed no strong indications of active bleeding. Patient likely with a slow bleed, currently responding appropriately to previous transfusions on the floors.  - Hgb dropped to 7.6 today from 8.0 yesterday, transfused 1 pRBC  - Recheck CBC 5pm today, contact GI if not responding appropriately  - Recheck PT/INR/fibrinogen tomorrow  - Continue midodrine 10mg q8; consider increasing if necessary with diuresis  - Protonix BID per GI  - Hold all AC and anti-platelet agents  - Continue CBC q24 unless having active bleeding  - Maintain T&S q48 hours, next ordered for tomorrow AM  - Transfuse Hgb <8  - Can consider iron chelation therapy given many transfusions

## 2024-08-12 NOTE — PROGRESS NOTE ADULT - PROBLEM SELECTOR PLAN 4
Patient's profound edema likely 2/2 large volume in during this admission as well as malnutrition. Previous POCUS have not identified a cardiac cause.    Plan:  - Consider further diuresis when Hgb >8 and not currently transfusing  - Strict I/Os Patient with chronic afib unable to be on AC for this and seemingly minimally responsive to amiodarone. Cardiology following, no obvious underlying cause aside from hemorrhage, which is difficult to control.  - Digoxin 125mcg every other day  - Continue current digoxin dose per cardiology  - Holding eliquis

## 2024-08-12 NOTE — PROGRESS NOTE ADULT - PROBLEM SELECTOR PLAN 7
Patient with chronic afib unable to be on AC for this and seemingly minimally responsive to amiodarone. Cardiology following, no obvious underlying cause aside from hemorrhage, which is difficult to control. Most recent digoxin trough low at 0.7.  - Digoxin 125mcg every other day  - Continue current digoxin dose per cardiology  - Holding eliquis Patient with CAD with multiple stents done as recently as 6/2024 but unable to be on DAPT due to persistent GIB.  - At risk for stent closure, but difficult to manage due to persistent GIB and recent hemorrhagic shock with multiple events requiring frequent transfusions in just the past several weeks.  - Continue to hold anti-platelet agents

## 2024-08-12 NOTE — PROGRESS NOTE ADULT - SUBJECTIVE AND OBJECTIVE BOX
DATE OF SERVICE: 08-12-24 @ 13:12    Patient is a 79y old  Male who presents with a chief complaint of hypotension (12 Aug 2024 09:31)      INTERVAL HISTORY: In no acute distress.     REVIEW OF SYSTEMS:  CONSTITUTIONAL: No weakness  EYES/ENT: No visual changes;  No throat pain   NECK: No pain or stiffness  RESPIRATORY: No cough, wheezing; No shortness of breath  CARDIOVASCULAR: No chest pain or palpitations  GASTROINTESTINAL: No abdominal  pain. No nausea, vomiting, or hematemesis  GENITOURINARY: No dysuria, frequency or hematuria  NEUROLOGICAL: No stroke like symptoms  SKIN: No rashes    TELEMETRY Personally reviewed: AF freq PVCs, 5 beats of WCT,   	  MEDICATIONS:  digoxin     Tablet 125 MICROGram(s) Oral every other day  metoprolol tartrate 12.5 milliGRAM(s) Oral two times a day  midodrine 10 milliGRAM(s) Oral every 8 hours        PHYSICAL EXAM:  T(C): 36.7 (08-12-24 @ 11:00), Max: 36.7 (08-12-24 @ 04:00)  HR: 111 (08-12-24 @ 11:00) (79 - 111)  BP: 103/69 (08-12-24 @ 11:00) (98/66 - 105/67)  RR: 19 (08-12-24 @ 11:00) (18 - 19)  SpO2: 95% (08-12-24 @ 11:00) (95% - 100%)  Wt(kg): --  I&O's Summary    11 Aug 2024 07:01  -  12 Aug 2024 07:00  --------------------------------------------------------  IN: 120 mL / OUT: 0 mL / NET: 120 mL          Appearance: In no distress	  HEENT:    PERRL, EOMI	  Cardiovascular:  S1 S2, + JVD  Respiratory: Lungs clear to auscultation	  Gastrointestinal:  Soft, Non-tender, + BS	  Vascularature:  anasarca  Psychiatric: Appropriate affect   Neuro: no acute focal deficits                               7.6    7.86  )-----------( 97       ( 12 Aug 2024 07:30 )             24.3     08-12    142  |  111<H>  |  22  ----------------------------<  118<H>  4.3   |  22  |  0.76    Ca    7.9<L>      12 Aug 2024 07:30  Phos  2.3     08-12  Mg     1.9     08-12    TPro  x   /  Alb  2.4<L>  /  TBili  x   /  DBili  x   /  AST  x   /  ALT  x   /  AlkPhos  x   08-11        Labs personally reviewed      ASSESSMENT/PLAN: 	      78-year-old male multiple medical problems history of hepatocellular carcinoma status post radiation therapy, AF s/p DCCV on Eliquis who was found to be hypotensive following NST. Patient has reported general weakness, fatigue, lightheadedness since being discharged from hospital. Reports mild chest discomfort in midsternal region. Reports dyspnea with minimal exertion. Also reports significant lack of appetite and poor PO intake. No dark or bloody stool, nausea or vomiting.       Problem/Plan - 1:  ·  Problem: Hypotension  ·  Plan: s/p pressors in MICU.  Now transferred to [facility]  - Patient presents with hypotension and also RAZIA. Has known orthostatic hypotension.   - Patient and wife report decreased PO intake likely 2/2 malignancy.  - GIB 7/9, s/p multiple units prbc, IR for mesenteric embolization; Repeat CT A/P with no extravazation  - c/w Midodrine 10mg PO [id number]  - CT A/P inconclusive     Problem/Plan - 2:  ·  Problem: CAD.   ·  Plan: Recent PCI to pLAD in June 2023  - ECG non-ischemic  - Plavix held given large melena; ideally should be on Plavix but high risk to resume      Problem/Plan - 3:  ·  Problem: Atrial Fibrillation  - s/p succesful DCCV 10/31  - Hold Eliquis 5mg BID given GIB  - s/p Amio  - c/w Dig 125mcg PO QOD  - Metoprolol 12.5mg BID     Problem/Plan - 4:  ·  Problem: Acute Hypoxic Respiratory Failure  - Now requiring 3L NC  - CT Chest shows B/L pleural effusion and atelectasis  - BNP 12K  - s/p Lasix 40mg IV once with albumin 8/9  - Plan for IV administration of albumin 8/12        KIRSTIE Latham DO [facility]  Cardiovascular Medicine  [address] [place] [address], [address]  Available through call or text on [employer]  Office: [phone]   DATE OF SERVICE: 08-12-24 @ 13:12    Patient is a 79y old  Male who presents with a chief complaint of hypotension (12 Aug 2024 09:31)      INTERVAL HISTORY: In no acute distress.     REVIEW OF SYSTEMS:  CONSTITUTIONAL: No weakness  EYES/ENT: No visual changes;  No throat pain   NECK: No pain or stiffness  RESPIRATORY: No cough, wheezing; No shortness of breath  CARDIOVASCULAR: No chest pain or palpitations  GASTROINTESTINAL: No abdominal  pain. No nausea, vomiting, or hematemesis  GENITOURINARY: No dysuria, frequency or hematuria  NEUROLOGICAL: No stroke like symptoms  SKIN: No rashes    TELEMETRY Personally reviewed: AF freq PVCs, 5 beats of WCT,   	  MEDICATIONS:  digoxin     Tablet 125 MICROGram(s) Oral every other day  metoprolol tartrate 12.5 milliGRAM(s) Oral two times a day  midodrine 10 milliGRAM(s) Oral every 8 hours        PHYSICAL EXAM:  T(C): 36.7 (08-12-24 @ 11:00), Max: 36.7 (08-12-24 @ 04:00)  HR: 111 (08-12-24 @ 11:00) (79 - 111)  BP: 103/69 (08-12-24 @ 11:00) (98/66 - 105/67)  RR: 19 (08-12-24 @ 11:00) (18 - 19)  SpO2: 95% (08-12-24 @ 11:00) (95% - 100%)  Wt(kg): --  I&O's Summary    11 Aug 2024 07:01  -  12 Aug 2024 07:00  --------------------------------------------------------  IN: 120 mL / OUT: 0 mL / NET: 120 mL          Appearance: In no distress	  HEENT:    PERRL, EOMI	  Cardiovascular:  S1 S2, + JVD  Respiratory: Lungs clear to auscultation	  Gastrointestinal:  Soft, Non-tender, + BS	  Vascularature:  anasarca  Psychiatric: Appropriate affect   Neuro: no acute focal deficits                               7.6    7.86  )-----------( 97       ( 12 Aug 2024 07:30 )             24.3     08-12    142  |  111<H>  |  22  ----------------------------<  118<H>  4.3   |  22  |  0.76    Ca    7.9<L>      12 Aug 2024 07:30  Phos  2.3     08-12  Mg     1.9     08-12    TPro  x   /  Alb  2.4<L>  /  TBili  x   /  DBili  x   /  AST  x   /  ALT  x   /  AlkPhos  x   08-11        Labs personally reviewed      ASSESSMENT/PLAN: 	    78-year-old male multiple medical problems history of hepatocellular carcinoma status post radiation therapy, AF s/p DCCV on Eliquis who was found to be hypotensive following NST. Patient has reported general weakness, fatigue, lightheadedness since being discharged from hospital. Reports mild chest discomfort in midsternal region. Reports dyspnea with minimal exertion. Also reports significant lack of appetite and poor PO intake. No dark or bloody stool, nausea or vomiting.       Problem/Plan - 1:  ·  Problem: Hypotension  ·  Plan: s/p pressors in MICU.  Now transferred to [facility]  - Patient presents with hypotension and also RAZIA. Has known orthostatic hypotension.   - Patient and wife report decreased PO intake likely 2/2 malignancy.  - GIB 7/9, s/p multiple units prbc, IR for mesenteric embolization; Repeat CT A/P with no extravazation  - c/w Midodrine 10mg PO [id number]  - CT A/P inconclusive     Problem/Plan - 2:  ·  Problem: CAD.   ·  Plan: Recent PCI to pLAD in June 2023  - ECG non-ischemic  - Plavix held given large melena; ideally should be on Plavix but high risk to resume      Problem/Plan - 3:  ·  Problem: Atrial Fibrillation  - s/p succesful DCCV 10/31  - Hold Eliquis 5mg BID given GIB  - s/p Amio  - c/w Dig 125mcg PO QOD  - Metoprolol 12.5mg BID     Problem/Plan - 4:  ·  Problem: Acute Hypoxic Respiratory Failure  - Now requiring 3L NC  - CT Chest shows B/L pleural effusion and atelectasis  - BNP 12K  - s/p Lasix 40mg IV once with albumin 8/9  - Plan for IV administration of albumin 8/12        KIRSTIE Latham DO [facility]  Cardiovascular Medicine  [address] [place] [address], [address]  Available through call or text on [employer] TE[employer]  Office: [phone]

## 2024-08-12 NOTE — PROGRESS NOTE ADULT - PROBLEM SELECTOR PLAN 3
Patient with episodes of NSVT throughout admission.    Plan:  - Replete electrolytes as necessary  - Continue to monitor Patient with failure to thrive iso metastatic disease as well as repeated intubation and difficulty swallowing.  MBS 8/6 showing no gross aspiration, but still high-risk. Recommended pureed diet with mildly thickened liquids and aspiration precautions given patient and family desire PO diet. NG tube removed 8/6 after study.  - Continue diet, to be supplemented with RD recs  - Monitor electrolytes as high risk for refeeding syndrome  - GOC meeting with family (wife, daughter) and palliative last week; further palliative follow-up this week

## 2024-08-12 NOTE — PROGRESS NOTE ADULT - PROBLEM SELECTOR PLAN 3
Patient's profound edema likely 2/2 large volume in during this admission as well as malnutrition.  appreciate plan:   - Consider further diuresis when Hgb >8 and not currently transfusing  - Strict I/Os.

## 2024-08-12 NOTE — PROGRESS NOTE ADULT - ATTENDING COMMENTS
-Anasarca with LE edema and RUE edema. -Will trial bumex 2mg iv with albumin. -Elevate and ACE wraps/compression for edema.   -1 PRBC today for goal > 8 in setting of CAD.   -IV PPI BID. CTM for signs of GIB.   -F/u heme recs. On octreotide.   -H/o Afib. on digoxin and metoprolol for rate control. Off AC in setting of GIB hx.   -Supportive care.   -D/w house staff.

## 2024-08-12 NOTE — PROGRESS NOTE ADULT - SUBJECTIVE AND OBJECTIVE BOX
Patient is a 79y old  Male who presents with a chief complaint of hypotension (12 Aug 2024 04:41)    Patient seen and examined  Appears comfortable, no complaints offered\  No acute overnight events noted    MEDICATIONS  (STANDING):  AQUAPHOR (petrolatum Ointment) 1 Application(s) Topical two times a day  chlorhexidine 2% Cloths 1 Application(s) Topical <User Schedule>  dextrose 50% Injectable 25 Gram(s) IV Push once  digoxin     Tablet 125 MICROGram(s) Oral every other day  folic acid Injectable 1 milliGRAM(s) IV Push daily  insulin lispro (ADMELOG) corrective regimen sliding scale   SubCutaneous three times a day before meals  insulin lispro (ADMELOG) corrective regimen sliding scale   SubCutaneous at bedtime  magnesium sulfate  IVPB 1 Gram(s) IV Intermittent once  metoprolol tartrate 12.5 milliGRAM(s) Oral two times a day  midodrine 10 milliGRAM(s) Oral every 8 hours  octreotide  Injectable 250 MICROGram(s) IV Push three times a day  pantoprazole  Injectable 40 milliGRAM(s) IV Push every 12 hours  potassium phosphate / sodium phosphate Powder (PHOS-NaK) 2 Packet(s) Oral once  potassium phosphate IVPB 30 milliMole(s) IV Intermittent once  sodium chloride 3%  Inhalation 4 milliLiter(s) Inhalation every 12 hours  thiamine Injectable 100 milliGRAM(s) IV Push daily    MEDICATIONS  (PRN):      Vital Signs Last 24 Hrs  T(C): 36.4 (12 Aug 2024 08:08), Max: 36.7 (12 Aug 2024 04:00)  T(F): 97.5 (12 Aug 2024 08:08), Max: 98.1 (12 Aug 2024 04:00)  HR: 99 (12 Aug 2024 08:08) (79 - 108)  BP: 100/70 (12 Aug 2024 08:08) (98/66 - 107/70)  BP(mean): --  RR: 18 (12 Aug 2024 08:08) (18 - 18)  SpO2: 100% (12 Aug 2024 08:08) (95% - 100%)    Parameters below as of 12 Aug 2024 08:08  Patient On (Oxygen Delivery Method): nasal cannula  O2 Flow (L/min): 2      PE  Awake, alert  Anicteric,  RRR  2L NC  anasarca  No c/c                        7.6    7.86  )-----------( 97       ( 12 Aug 2024 07:30 )             24.3       08-12    142  |  111<H>  |  22  ----------------------------<  118<H>  4.3   |  22  |  0.76    Ca    7.9<L>      12 Aug 2024 07:30  Phos  2.3     08-12  Mg     1.9     08-12    TPro  x   /  Alb  2.4<L>  /  TBili  x   /  DBili  x   /  AST  x   /  ALT  x   /  AlkPhos  x   08-11

## 2024-08-12 NOTE — PROGRESS NOTE ADULT - PROBLEM SELECTOR PLAN 8
pt remains full code  ongoing discussions  as per Yamilet would like all family to be involved in IDT family meeting, will f/u on Wednesday to establish time and day as daughter is returning from vacation tomorrow

## 2024-08-13 NOTE — PROGRESS NOTE ADULT - PROBLEM SELECTOR PLAN 12
DVT: unable to be on anything but SCDs  Diet: pureed diet, mildly thickened liquids  GOC: Full code, palliative followup DVT: unable to be on anything but SCDs  Diet: pureed diet, mildly thickened liquids  GOC: Full code, palliative follow-up

## 2024-08-13 NOTE — CONSULT NOTE ADULT - CONSULT REQUESTED BY NAME
Dr. Chavarria/Dr. Hameed/Dr. Graham
RN
Dr. Agustin Hameed
Dr Marrufo
reena andrade
Dr. Hernandez
Internal Medicine
SICU
Joshua Klein
SLP
MICU team

## 2024-08-13 NOTE — PROGRESS NOTE ADULT - PROBLEM SELECTOR PLAN 1
Patient with persistent UGIB with coffee ground emesis and melanotic stools multiple times requiring IR GDA embolization initially and then EGD s/p 3 clips for duodenal ulcer. Patient has been off of his aspirin and plavix for his very recent stent and Eliquis for his chronic afib since he has required so many repeat transfusions due to this active blood loss. The hemorrhagic shock seems to have temporarily resolved. Patient's Hgb continues to have intermittent drops requiring single transfusions of pRBCs. CTAP 8/4 and 8/8 showed no strong indications of active bleeding. Patient likely with a slow bleed, currently responding appropriately to previous transfusions on the floors.  - Hgb dropped to 7.6 from 8.0, transfused 1 pRBC yesterday, repeat at 8.2 likely with dilutional component  - Recheck CBC today, potentially contact GI based on results  - F/u PT/INR/fibrinogen today  - Continue midodrine 10mg q8; consider increasing if necessary with diuresis  - Protonix BID per GI  - Hold all AC and anti-platelet agents  - Continue CBC q24 unless having active bleeding  - Maintain T&S q48 hours, next ordered for tomorrow AM  - Transfuse Hgb <8  - Can consider iron chelation therapy given many transfusions Patient with persistent UGIB with coffee ground emesis and melanotic stools multiple times requiring IR GDA embolization initially and then EGD s/p 3 clips for duodenal ulcer. Patient has been off of his aspirin and plavix for his very recent stent and Eliquis for his chronic afib since he has required so many repeat transfusions due to this active blood loss. The hemorrhagic shock seems to have temporarily resolved. Patient's Hgb continues to have intermittent drops requiring single transfusions of pRBCs. CTAP 8/4 and 8/8 showed no strong indications of active bleeding.    Plan:  - CBC continuing to show drops in hemoglobin, 7.7 today, given 1 pRBC  - Reached out to GI, because of no overt GI bleeding, suggested workup for non-GI causes of anemia  - Recheck CBC to assess response later today  - F/u PT/INR/fibrinogen today  - Continue midodrine 10mg q8; consider increasing if necessary with diuresis  - Protonix BID per GI  - Hold all AC and anti-platelet agents  - Continue CBC q24 unless having active bleeding  - Maintain T&S q48 hours, next ordered for tomorrow AM  - Transfuse Hgb <8  - Can consider iron chelation therapy given many transfusions

## 2024-08-13 NOTE — PROGRESS NOTE ADULT - ATTENDING COMMENTS
-Hgb today below goal of 8, so 1 PRBC given. -C/w PPI IV BID. -Greenish BM reported per RN, can d/w GI if anything else could be attempted.   -Will give another 3 doses of albumin today to help with anasarca. In addition to bumex 2mg IV BID.   -Supportive care.   -Palliative discussions ongoing.   -D/w house staff. -Hgb today below goal of 8, so 1 PRBC given. -C/w PPI IV BID. -Greenish BM reported per RN, can d/w GI if anything else could be attempted.   -Will give another 3 doses of albumin today to help with anasarca. In addition to bumex 2mg IV BID. -Leg elevation and compression.   -Supportive care.   -Palliative discussions ongoing.   -D/w house staff.

## 2024-08-13 NOTE — CONSULT NOTE ADULT - PROVIDER SPECIALTY LIST ADULT
Palliative Care
Endocrinology
Intervent Radiology
Wound Care
Cardiology
Palliative Care
Gastroenterology
Heme/Onc
Nephrology
Surgery
ENT

## 2024-08-13 NOTE — PROGRESS NOTE ADULT - SUBJECTIVE AND OBJECTIVE BOX
Medicine Progress Note      Patient is a 79y old  Male who presents with a chief complaint of hypotension (10 Aug 2024 10:34)      SUBJECTIVE / OVERNIGHT EVENTS:   Overnight, patient reporting nausea and given zofran.     MEDICATIONS  (STANDING):  AQUAPHOR (petrolatum Ointment) 1 Application(s) Topical two times a day  chlorhexidine 2% Cloths 1 Application(s) Topical <User Schedule>  dextrose 50% Injectable 25 Gram(s) IV Push once  digoxin     Tablet 125 MICROGram(s) Oral every other day  folic acid Injectable 1 milliGRAM(s) IV Push daily  insulin lispro (ADMELOG) corrective regimen sliding scale   SubCutaneous three times a day before meals  insulin lispro (ADMELOG) corrective regimen sliding scale   SubCutaneous at bedtime  metoprolol tartrate 12.5 milliGRAM(s) Oral two times a day  midodrine 10 milliGRAM(s) Oral every 8 hours  octreotide  Injectable 250 MICROGram(s) IV Push three times a day  pantoprazole  Injectable 40 milliGRAM(s) IV Push every 12 hours  sodium chloride 3%  Inhalation 4 milliLiter(s) Inhalation every 12 hours  thiamine Injectable 100 milliGRAM(s) IV Push daily    MEDICATIONS  (PRN):    CAPILLARY BLOOD GLUCOSE      POCT Blood Glucose.: 128 mg/dL (10 Aug 2024 21:24)  POCT Blood Glucose.: 119 mg/dL (10 Aug 2024 17:48)  POCT Blood Glucose.: 159 mg/dL (10 Aug 2024 11:34)  POCT Blood Glucose.: 132 mg/dL (10 Aug 2024 07:43)    I&O's Summary    10 Aug 2024 07:01  -  11 Aug 2024 07:00  --------------------------------------------------------  IN: 360 mL / OUT: 1 mL / NET: 359 mL        PHYSICAL EXAM:  Vital Signs Last 24 Hrs  T(C): 36.7 (11 Aug 2024 04:25), Max: 36.8 (11 Aug 2024 00:01)  T(F): 98 (11 Aug 2024 04:25), Max: 98.2 (11 Aug 2024 00:01)  HR: 96 (11 Aug 2024 04:25) (90 - 102)  BP: 106/64 (11 Aug 2024 04:25) (100/68 - 106/64)  BP(mean): --  RR: 18 (11 Aug 2024 04:25) (18 - 18)  SpO2: 96% (11 Aug 2024 04:25) (95% - 100%)    Parameters below as of 11 Aug 2024 04:25  Patient On (Oxygen Delivery Method): nasal cannula  O2 Flow (L/min): 2    PHYSICAL EXAM:  GENERAL: NAD, ill-appearing, uncomfortable-appearing, has NC properly in place, cough  HEAD: Atraumatic, Normocephalic  EYES: EOMI, conjunctiva and sclera clear  NECK: Supple  CHEST/LUNG: Clear to auscultation bilaterally in anterior chest, No wheezes or crackles  HEART: Normal S1/S2; Irregular rhythm; No murmurs, rubs, or gallops  ABDOMEN: Soft, Nontender, edematous  EXTREMITIES: Profound pitting edema in lower extremities until hips, edematous in proximal extremities; pitting is deep but rebounds to quarter of depth within seconds  PSYCH: A&Ox1-2, able to state basic needs, intermittently not oriented to place or time  NEUROLOGY: no focal neurologic deficit      LABS:                        8.6    8.03  )-----------( 91       ( 10 Aug 2024 17:52 )             27.5     08-10    142  |  111<H>  |  21  ----------------------------<  147<H>  3.8   |  22  |  0.70    Ca    8.0<L>      10 Aug 2024 09:57  Phos  2.6     08-10  Mg     1.7     08-10              Urinalysis Basic - ( 10 Aug 2024 09:57 )    Color: x / Appearance: x / SG: x / pH: x  Gluc: 147 mg/dL / Ketone: x  / Bili: x / Urobili: x   Blood: x / Protein: x / Nitrite: x   Leuk Esterase: x / RBC: x / WBC x   Sq Epi: x / Non Sq Epi: x / Bacteria: x        SARS-CoV-2: NotDetec (11 Jul 2024 04:36)      RADIOLOGY & ADDITIONAL TESTS:  Imaging from Last 24 Hours: Medicine Progress Note      Patient is a 79y old  Male who presents with a chief complaint of hypotension (10 Aug 2024 10:34)      SUBJECTIVE / OVERNIGHT EVENTS:   Overnight, patient reporting nausea and given zofran. Also noted one episode of 7bWCT with  for 2.83 seconds.    Today, patient states that he has no complaints but feels that he is going to have a bowel movement.    MEDICATIONS  (STANDING):  AQUAPHOR (petrolatum Ointment) 1 Application(s) Topical two times a day  chlorhexidine 2% Cloths 1 Application(s) Topical <User Schedule>  dextrose 50% Injectable 25 Gram(s) IV Push once  digoxin     Tablet 125 MICROGram(s) Oral every other day  folic acid Injectable 1 milliGRAM(s) IV Push daily  insulin lispro (ADMELOG) corrective regimen sliding scale   SubCutaneous three times a day before meals  insulin lispro (ADMELOG) corrective regimen sliding scale   SubCutaneous at bedtime  metoprolol tartrate 12.5 milliGRAM(s) Oral two times a day  midodrine 10 milliGRAM(s) Oral every 8 hours  octreotide  Injectable 250 MICROGram(s) IV Push three times a day  pantoprazole  Injectable 40 milliGRAM(s) IV Push every 12 hours  sodium chloride 3%  Inhalation 4 milliLiter(s) Inhalation every 12 hours  thiamine Injectable 100 milliGRAM(s) IV Push daily    MEDICATIONS  (PRN):    CAPILLARY BLOOD GLUCOSE      POCT Blood Glucose.: 128 mg/dL (10 Aug 2024 21:24)  POCT Blood Glucose.: 119 mg/dL (10 Aug 2024 17:48)  POCT Blood Glucose.: 159 mg/dL (10 Aug 2024 11:34)  POCT Blood Glucose.: 132 mg/dL (10 Aug 2024 07:43)    I&O's Summary    10 Aug 2024 07:01  -  11 Aug 2024 07:00  --------------------------------------------------------  IN: 360 mL / OUT: 1 mL / NET: 359 mL        PHYSICAL EXAM:  Vital Signs Last 24 Hrs  T(C): 36.7 (11 Aug 2024 04:25), Max: 36.8 (11 Aug 2024 00:01)  T(F): 98 (11 Aug 2024 04:25), Max: 98.2 (11 Aug 2024 00:01)  HR: 96 (11 Aug 2024 04:25) (90 - 102)  BP: 106/64 (11 Aug 2024 04:25) (100/68 - 106/64)  BP(mean): --  RR: 18 (11 Aug 2024 04:25) (18 - 18)  SpO2: 96% (11 Aug 2024 04:25) (95% - 100%)    Parameters below as of 11 Aug 2024 04:25  Patient On (Oxygen Delivery Method): nasal cannula  O2 Flow (L/min): 2    PHYSICAL EXAM:  GENERAL: NAD, ill-appearing, uncomfortable-appearing, has NC properly in place, cough  HEAD: Atraumatic, Normocephalic  EYES: EOMI, conjunctiva and sclera clear  NECK: Supple  CHEST/LUNG: Clear to auscultation bilaterally in anterior chest, No wheezes or crackles  HEART: Normal S1/S2; Irregular rhythm; No murmurs, rubs, or gallops  ABDOMEN: Soft, Nontender, edematous  EXTREMITIES: Profound pitting edema in lower extremities until hips, edematous in proximal extremities; pitting is deep but rebounds to quarter of depth within seconds  PSYCH: A&Ox1-2, able to state basic needs, intermittently not oriented to place or time  NEUROLOGY: no focal neurologic deficit      LABS:                        8.6    8.03  )-----------( 91       ( 10 Aug 2024 17:52 )             27.5     08-10    142  |  111<H>  |  21  ----------------------------<  147<H>  3.8   |  22  |  0.70    Ca    8.0<L>      10 Aug 2024 09:57  Phos  2.6     08-10  Mg     1.7     08-10              Urinalysis Basic - ( 10 Aug 2024 09:57 )    Color: x / Appearance: x / SG: x / pH: x  Gluc: 147 mg/dL / Ketone: x  / Bili: x / Urobili: x   Blood: x / Protein: x / Nitrite: x   Leuk Esterase: x / RBC: x / WBC x   Sq Epi: x / Non Sq Epi: x / Bacteria: x        SARS-CoV-2: NotDetec (11 Jul 2024 04:36)      RADIOLOGY & ADDITIONAL TESTS:  Imaging from Last 24 Hours:

## 2024-08-13 NOTE — PROGRESS NOTE ADULT - PROBLEM SELECTOR PLAN 3
Patient with failure to thrive iso metastatic disease as well as repeated intubation and difficulty swallowing.  MBS 8/6 showing no gross aspiration, but still high-risk. Recommended pureed diet with mildly thickened liquids and aspiration precautions given patient and family desire PO diet. NG tube removed 8/6 after study.  - Continue diet, to be supplemented with RD recs  - Monitor electrolytes as high risk for refeeding syndrome  - GOC meeting with family (wife, daughter) and palliative last week; further palliative follow-up this week Patient with failure to thrive iso metastatic disease as well as repeated intubation and difficulty swallowing.  MBS 8/6 showing no gross aspiration, but still high-risk. Recommended pureed diet with mildly thickened liquids and aspiration precautions given patient and family desire PO diet. NG tube removed 8/6 after study.    Plan:  - Continue diet, to be supplemented with RD recs  - Monitor electrolytes as high risk for refeeding syndrome  - GOC meeting with family (wife, daughter) and palliative last week; further palliative follow-up this week with potential for multidisciplinary meeting

## 2024-08-13 NOTE — PROGRESS NOTE ADULT - PROBLEM SELECTOR PLAN 5
Likely 2/2 to GI bleeding.  Iron 89 (WNL), UIBC 48 (low), TIBC 138 (low), Sat 65% (high), Ferritin 998 (high)  Vitamin B12 WNL, Haptoglobin WNL      Plan:  - Continue to monitor Likely 2/2 to GI bleeding.  Iron 89 (WNL), UIBC 48 (low), TIBC 138 (low), Sat 65% (high), Ferritin 998 (high)  Vitamin B12 WNL, Haptoglobin WNL    Plan:  - Continue to monitor

## 2024-08-13 NOTE — PROGRESS NOTE ADULT - SUBJECTIVE AND OBJECTIVE BOX
Patient is a 79y old  Male who presents with a chief complaint of hypotension (13 Aug 2024 06:31)    Patient seen and examined  Appears comfortable. No complaints offered.    MEDICATIONS  (STANDING):  albumin human 25% IVPB 50 milliLiter(s) IV Intermittent every 8 hours  AQUAPHOR (petrolatum Ointment) 1 Application(s) Topical two times a day  buMETAnide Injectable 2 milliGRAM(s) IV Push once  chlorhexidine 2% Cloths 1 Application(s) Topical <User Schedule>  dextrose 50% Injectable 25 Gram(s) IV Push once  digoxin     Tablet 125 MICROGram(s) Oral every other day  folic acid Injectable 1 milliGRAM(s) IV Push daily  insulin lispro (ADMELOG) corrective regimen sliding scale   SubCutaneous at bedtime  insulin lispro (ADMELOG) corrective regimen sliding scale   SubCutaneous three times a day before meals  metoprolol tartrate 12.5 milliGRAM(s) Oral two times a day  midodrine 10 milliGRAM(s) Oral every 8 hours  octreotide  Injectable 250 MICROGram(s) IV Push three times a day  pantoprazole  Injectable 40 milliGRAM(s) IV Push every 12 hours  sodium chloride 3%  Inhalation 4 milliLiter(s) Inhalation every 12 hours  thiamine Injectable 100 milliGRAM(s) IV Push daily    MEDICATIONS  (PRN):  ondansetron Injectable 4 milliGRAM(s) IV Push once PRN Nausea and/or Vomiting      Vital Signs Last 24 Hrs  T(C): 36.3 (13 Aug 2024 08:34), Max: 36.7 (12 Aug 2024 11:00)  T(F): 97.4 (13 Aug 2024 08:34), Max: 98 (12 Aug 2024 11:00)  HR: 97 (13 Aug 2024 08:34) (73 - 111)  BP: 107/69 (13 Aug 2024 08:34) (99/65 - 117/74)  BP(mean): --  RR: 20 (13 Aug 2024 08:34) (17 - 20)  SpO2: 98% (13 Aug 2024 08:34) (95% - 100%)    Parameters below as of 13 Aug 2024 08:34  Patient On (Oxygen Delivery Method): nasal cannula  O2 Flow (L/min): 2      PE  Awake, alert  Anicteric, MMM  RRR  2L NC  Abd soft, NT, ND  No c/c                          8.2    7.82  )-----------( 100      ( 12 Aug 2024 17:57 )             25.4       08-12    142  |  111<H>  |  22  ----------------------------<  118<H>  4.3   |  22  |  0.76    Ca    7.9<L>      12 Aug 2024 07:30  Phos  2.3     08-12  Mg     1.9     08-12

## 2024-08-13 NOTE — CONSULT NOTE ADULT - CONSULT REASON
Hx of metastatic neuroendocrine tumor
Hypotension
GOC
Melena/coffee ground emesis
metastatic pancreatic NET
Abn creatinine
GI bleed
Angio/embolization
Left upper back wound
Vallecular lesion noted on FEES by SLP
GOC

## 2024-08-13 NOTE — PROGRESS NOTE ADULT - ASSESSMENT
79 yo male with PMH of CAD s/p PCI x3 with most recent stent 6/12/24 on Plavix, chronic Afib on eliquis, orthostatic hypotension on midodrine, PAD, neuroendocrine tumor with RT currently on hold, recent admission for dx cath on 6/24/24 who presented for hypotension, admitted for GIB and hemorrhagic shock. Found to be bleeding from duodenum. Transferred to MICU, on pressors.     1. Pancreatic NET- metastatic   -- was on lanreotide then had POD in liver and started on peptide receptor radiotherapy (PRRT)- typically given every 8 weeks for 4 doses. He received one dose on 10/20/2023 and planned to get his 2nd dose at the end of December however his course was complicated by multiple hospitalizations that were noncancer related (decompensated heart failure).   -- 5/28/24 PET Dotatate (compared to 9/2023): several new somatostatin avid osseous lesions, some faintly sclerotic, suspicious for mets. Increased upper RP nodes, probably metastatic. Decreased intensely tracer avid right supradiaphragmatic and upper abdominal nodes, suspicious for metastasis. Redemonstrated intensely somatostatin avid bilobar hepatic lesions, consistent with metastases again morphologically difficult to delineate.   -- CT reviewed by MS[facility]: SINCE MAY 8 2024 INCREASED HEPATIC METASTASES, NEWLY SEEN PRIMARY TUMOR IN THE PANCREAS, and active bleeding within the duodenum with associated intraluminal hematoma.   -- chromogranin level is elevated at 2058, but no prior [place] [facility] [place]   -- [place] [facility] [place] no plan for chemotherapy inpatient, if clinically improves/stabilizes then can be considered for treatment outpatient  --Continue octreotide 250mcg TID    2. Duodenal bleed, Hemorrhagic shock  - Now transferred to medicine from MICU, s/p extubation 7/22. NG tube in place.  - CT w/ bleed in duodenum. GI called, EGD 7/9 -> S/p  IR embolization 7/9, intubated in MICU -> s/p extubation 7/15 -> intubated 7/18  - s/p multiple transfusions, fibrinogen low would recommend Cryo for < 100, but coags have improved as are essentially normal now so unlikely, probably was related to liver dysfunction.   - s/p repeat EGD 7/12 showing duodenal lesions w/clots and oozing, no clear targets for intervention and no active bleeding, purastat applied to all areas of oozing.   - S/p EGD 7/18: duodenal ulcer with active oozing, ulcer with visible vessel. Bleeding treated.   - s/p multiple transfusions, per GI, high risk for rebleed, will continue PPI.   - Repeat CT AP w/ No evidence of GI bleed.  Interval endoscopic versus surgical intervention with metallic streak artifact suggestive of surgical clips in the region of the proximal one third portions of the duodenum. Evaluation of the previously seen hematoma is limited secondary to this artifact. Moderate bilateral pleural effusions and associated compressive atelectasis, right greater than left, overall slightly increased from previous CT. Anasarca.  - Per IR, In the absences of any active extravasation on CTA there's no place to target for an embolization  - 7/29 sputum culture + Pseudomonas; RRT on 8/2 due to tachycardia, hypotension; febrile to 101. Currently on cefepime x 7 days  - Continue octreotide 250 mcg TID  - CT AP 8/4: No evidence of active intraluminal extravasation of contrast. No evidence of acute intraperitoneal or retroperitoneal hemorrhage  - Underwent swallow assessment, started mildly thick liquids  - 8/8 CTA a/p d/t reports of melena, H/H stable--> Limited evaluation for active gastrointestinal hemorrhage due to high density oral contrast within the colon and streak artifact from orthopedic hardware, embolization material, and duodenal clips. Question a focus of enhancement in the duodenum adjacent to the duodenal clips versus streak artifact, with active hemorrhage within the duodenum not excluded.    3. C. diff   - diarrhea resolved, off vanco    Anasarca  --plan for albumin x 3, bumex    Will continue to follow.    Christen Rosales NP  Hematology/ Oncology  [facility]  [phone] (office)  [phone] (alt office)  Evenings and weekends please call MD on call or office

## 2024-08-13 NOTE — PROGRESS NOTE ADULT - PROBLEM SELECTOR PLAN 7
Patient with CAD with multiple stents done as recently as 6/2024 but unable to be on DAPT due to persistent GIB.  - At risk for stent closure, but difficult to manage due to persistent GIB and recent hemorrhagic shock with multiple events requiring frequent transfusions in just the past several weeks.  - Continue to hold anti-platelet agents Patient with CAD with multiple stents done as recently as 6/2024 but unable to be on DAPT due to persistent GIB.    Plan:  - At risk for stent closure, but difficult to manage due to persistent GIB and recent hemorrhagic shock with multiple events requiring frequent transfusions in just the past several weeks.  - Continue to hold anti-platelet agents

## 2024-08-13 NOTE — CONSULT NOTE ADULT - SUBJECTIVE AND OBJECTIVE BOX
Wound Surgery Consult Note:    HPI:  79M hx of metastatic neuroendocrine pancreatic cancer, hx of CAD s/p PCI x3 with stents placed on 6/12/24, chronic afib, orthostatic hypotension on midodrine, and PAD here for UGIB and hemorrhagic shock requiring MICU for pressors, course complicated by C Diff, afib RVR, and RAZIA iso hypotension. Transferred to floors with intermittent melena and Hgb drops; currently no acute surgical or GI intervention recommended. GOC discussions ongoing with palliative.    Request for wound care evaluation of the sacrum and bilateral buttocks received from nursing. Mr. Espinal was encountered on an alternating air with low air loss surface. He was seen with his clinical nurse.  He is incontinent of stool and urine. His immobility, inactivity, incontinence of bowel and bladder in addition to poor nutritional status all contribute to his risk of pressure injury development and hinder healing. Principles of pressure injury prevention including but not limited to turning and positioning reviewed with patient.     PAST MEDICAL & SURGICAL HISTORY:  Hypertension  Hypercholesterolemia  PAD (peripheral artery disease)  Neuroendocrine carcinoma  Hepatic carcinoma  Atrial fibrillation and flutter  CAD (coronary artery disease)  S/P knee replacement, bilateral  S/P hip replacement, right hip  History of hernia repair  H/O laminectomy  History of lumbosacral spine surgery  History of cardioversion    REVIEW OF SYSTEMS  CONSTITUTIONAL: + weakness, no fevers or chills  EYES/ENT: No visual changes;  No vertigo or throat pain   MOUTH: No oral lesion, moist  NECK: No pain or stiffness  RESPIRATORY: No cough, wheezing, hemoptysis; No shortness of breath  CARDIOVASCULAR: No chest pain or palpitations  GASTROINTESTINAL: No abdominal or epigastric pain. No nausea, vomiting, or hematemesis; No diarrhea or constipation. No melena or hematochezia.  GENITOURINARY: No dysuria, frequency or hematuria  NEUROLOGICAL: + weakness  SKIN: No itching, rashes  PSYCH: no confusion or altered mental status    MEDICATIONS  (STANDING):  albumin human 25% IVPB 50 milliLiter(s) IV Intermittent every 8 hours  AQUAPHOR (petrolatum Ointment) 1 Application(s) Topical two times a day  buMETAnide Injectable 2 milliGRAM(s) IV Push once  chlorhexidine 2% Cloths 1 Application(s) Topical <User Schedule>  dextrose 50% Injectable 25 Gram(s) IV Push once  digoxin     Tablet 125 MICROGram(s) Oral every other day  folic acid Injectable 1 milliGRAM(s) IV Push daily  insulin lispro (ADMELOG) corrective regimen sliding scale   SubCutaneous at bedtime  insulin lispro (ADMELOG) corrective regimen sliding scale   SubCutaneous three times a day before meals  metoprolol tartrate 12.5 milliGRAM(s) Oral two times a day  midodrine 10 milliGRAM(s) Oral every 8 hours  octreotide  Injectable 250 MICROGram(s) IV Push three times a day  pantoprazole  Injectable 40 milliGRAM(s) IV Push every 12 hours  sodium chloride 3%  Inhalation 4 milliLiter(s) Inhalation every 12 hours  thiamine Injectable 100 milliGRAM(s) IV Push daily    MEDICATIONS  (PRN):  ondansetron Injectable 4 milliGRAM(s) IV Push once PRN Nausea and/or Vomiting    Allergies    No Known Allergies    Intolerances    SOCIAL HISTORY:  , Denies smoking, ETOH, drugs    FAMILY HISTORY: no pertinent family history among first degree relatives    Vital Signs Last 24 Hrs  T(C): 36.3 (13 Aug 2024 08:34), Max: 36.6 (13 Aug 2024 00:00)  T(F): 97.4 (13 Aug 2024 08:34), Max: 97.8 (13 Aug 2024 00:00)  HR: 97 (13 Aug 2024 08:34) (73 - 109)  BP: 107/69 (13 Aug 2024 08:34) (99/65 - 117/74)  BP(mean): --  RR: 20 (13 Aug 2024 08:34) (17 - 20)  SpO2: 98% (13 Aug 2024 08:34) (96% - 100%)    Parameters below as of 13 Aug 2024 08:34  Patient On (Oxygen Delivery Method): nasal cannula  O2 Flow (L/min): 2    Physical Exam:  General: alert, obese  Ophthamology: sclera clear  ENMT: moist mucous membranes, trachea midline  Respiratory: equal chest rise with respirations  Gastrointestinal: soft NT/ND  Neurology: verbal,  following commands  Psych: calm, cooperative  Musculoskeletal: no contractures  Vascular: BLE edema equal  Skin:  Left upper back wound, superficially denuded skin, central open area with central fibrinous tissue covering it, peripherally there is epithelialization from the edges, L 1.2cm x 1.2cm x 0.1cm, scant serosanguinous drainage  No odor, erythema, increased warmth, tenderness, induration, fluctuance    LABS:  08-12    142  |  111<H>  |  22  ----------------------------<  118<H>  4.3   |  22  |  0.76    Ca    7.9<L>      12 Aug 2024 07:30  Phos  2.3     08-12  Mg     1.9     08-12                            7.7    6.69  )-----------( 103      ( 13 Aug 2024 10:28 )             24.9     PT/INR - ( 13 Aug 2024 10:28 )   PT: 12.3 sec;   INR: 1.12 ratio         PTT - ( 13 Aug 2024 10:28 )  PTT:28.0 sec  Urinalysis Basic - ( 12 Aug 2024 07:30 )    Color: x / Appearance: x / SG: x / pH: x  Gluc: 118 mg/dL / Ketone: x  / Bili: x / Urobili: x   Blood: x / Protein: x / Nitrite: x   Leuk Esterase: x / RBC: x / WBC x   Sq Epi: x / Non Sq Epi: x / Bacteria: x

## 2024-08-13 NOTE — PROGRESS NOTE ADULT - PROBLEM SELECTOR PLAN 2
Patient's profound edema likely 2/2 large volume in during this admission as well as malnutrition. Previous POCUS have not identified a cardiac cause.    Plan:  - Continue Albumin 25% 50mL q8hr for 3 doses today???***  - Give Bumex 2mg IV???*** consider further diuresis based on response and patient clinical status  - Strict I/Os Patient's profound edema likely 2/2 large volume in during this admission as well as malnutrition. Previous POCUS have not identified a cardiac cause.    Plan:  - Continue Albumin 25% 50mL q8hr today  - Give Bumex 2mg IV bid today; will continue further diuresis based on response and patient clinical status  - Strict I/Os

## 2024-08-13 NOTE — CONSULT NOTE ADULT - CONSULT REQUESTED DATE/TIME
13-Aug-2024 11:15
17-Jul-2024
09-Jul-2024 07:00
10-Jul-2024 13:11
03-Jul-2024 10:54
03-Jul-2024 23:21
04-Jul-2024 16:19
05-Jul-2024 12:35
21-Jul-2024 09:45
21-Jul-2024 21:54
12-Jul-2024

## 2024-08-13 NOTE — PROGRESS NOTE ADULT - PROBLEM SELECTOR PLAN 8
Patient with known pancreatic neuroendocrine tumor and was on lanreotide. Was later on radiotherapy but due to multiple hospitalizations, was unable to receive follow-up doses. Patient did have PET done in May that showed lesions suspicious for mets. CT also in May showed increased hepatic metastases.   - Continue octreotide 250mcg TID Patient with known pancreatic neuroendocrine tumor and was on lanreotide. Was later on radiotherapy but due to multiple hospitalizations, was unable to receive follow-up doses. Patient did have PET done in May that showed lesions suspicious for mets. CT also in May showed increased hepatic metastases.     Plan:  - Continue octreotide 250mcg TID

## 2024-08-13 NOTE — PROGRESS NOTE ADULT - ASSESSMENT
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 Transferred to floors with intermittent melena and Hgb drops; currently no acute surgical or GI intervention recommended. GOC discussions ongoing with palliative.

## 2024-08-13 NOTE — PROGRESS NOTE ADULT - PROBLEM SELECTOR PLAN 4
Patient with chronic afib unable to be on AC for this and seemingly minimally responsive to amiodarone. Cardiology following, no obvious underlying cause aside from hemorrhage, which is difficult to control.  - Digoxin 125mcg every other day  - Continue current digoxin dose per cardiology  - Holding eliquis Patient with chronic afib unable to be on AC for this and seemingly minimally responsive to amiodarone. Cardiology following, no obvious underlying cause aside from hemorrhage, which is difficult to control.    Plan:  - Digoxin 125mcg every other day  - Continue current digoxin dose per cardiology  - Holding eliquis

## 2024-08-13 NOTE — PROGRESS NOTE ADULT - PROBLEM SELECTOR PLAN 10
Resolved  Patient prolonged hospitalization and now with fevers and tachycardia. Poor cough especially in the setting of repeated intubations and extubations last extubated on 7/28. Not feeling SOB, but will get imaging, cultures, and start abx. UCx 8/2 showing Klebsiella and sensitivity to cefepime. Patient denied dysuria. Blood cultures showing no growth.  - S/p cefepime 2g q8 for 7d (8/2-8/8)

## 2024-08-13 NOTE — CONSULT NOTE ADULT - ASSESSMENT
Impression:    Left upper back wound  Incontinence of bowel and bladder  Incontinence Dermatitis    Recommend:  1.) topical therapy: Left upper back wound – cleanse with NS, pat dry, apply Medihoney paste, cover with an allevyn foam every other day  2.) Incontinence Management - incontinence cleanser, pads, pericare BID  3.) Maintain on an alternating air with low air loss surface  4.) Turn and reposition Q 2 hours  5.) Nutrition optimization - please add Cordell  6.) Offload heels/feet with complete cair air fluidized boots/pillows; ensure that the soles of the feet are not resting on the foot board of the bed.  7.) chair cushion for chair sitting    Care as per medicine. Will not actively follow but will remain available. Please recall for new issues or deterioration.  Upon discharge [facility] [address] [place] [address] [phone]  Thank you for this consult  Katt Ma, NP-C, CWOCN via TEAMS

## 2024-08-13 NOTE — PROGRESS NOTE ADULT - SUBJECTIVE AND OBJECTIVE BOX
DATE OF SERVICE: 08-13-24 @ 15:50    Patient is a 79y old  Male who presents with a chief complaint of hypotension (13 Aug 2024 11:15)      INTERVAL HISTORY: In no acute distress.     REVIEW OF SYSTEMS:  CONSTITUTIONAL: No weakness  EYES/ENT: No visual changes;  No throat pain   NECK: No pain or stiffness  RESPIRATORY: No cough, wheezing; No shortness of breath  CARDIOVASCULAR: No chest pain or palpitations  GASTROINTESTINAL: No abdominal  pain. No nausea, vomiting, or hematemesis  GENITOURINARY: No dysuria, frequency or hematuria  NEUROLOGICAL: No stroke like symptoms  SKIN: No rashes    TELEMETRY Personally reviewed: AF  with freq PVCs, Bigeminy, trigeminy  	  MEDICATIONS:  digoxin     Tablet 125 MICROGram(s) Oral every other day  metoprolol tartrate 12.5 milliGRAM(s) Oral two times a day  midodrine 10 milliGRAM(s) Oral every 8 hours        PHYSICAL EXAM:  T(C): 36.4 (08-13-24 @ 13:47), Max: 36.6 (08-13-24 @ 00:00)  HR: 80 (08-13-24 @ 13:47) (73 - 109)  BP: 122/71 (08-13-24 @ 13:47) (99/65 - 122/71)  RR: 20 (08-13-24 @ 13:47) (17 - 20)  SpO2: 98% (08-13-24 @ 13:47) (95% - 100%)  Wt(kg): --  I&O's Summary    12 Aug 2024 07:01  -  13 Aug 2024 07:00  --------------------------------------------------------  IN: 850 mL / OUT: 1300 mL / NET: -450 mL    13 Aug 2024 07:01  -  13 Aug 2024 15:50  --------------------------------------------------------  IN: 150 mL / OUT: 650 mL / NET: -500 mL          Appearance: In no distress	  HEENT:    PERRL, EOMI	  Cardiovascular:  S1 S2, No JVD  Respiratory: rales B/L   Gastrointestinal:  Soft, Non-tender, + BS	  Vascularature:  anasarca  Psychiatric: Appropriate affect   Neuro: no acute focal deficits                               7.7    6.69  )-----------( 103      ( 13 Aug 2024 10:28 )             24.9     08-13    143  |  109<H>  |  26<H>  ----------------------------<  120<H>  4.3   |  21<L>  |  0.95    Ca    8.1<L>      13 Aug 2024 10:28  Phos  3.5     08-13  Mg     1.9     08-13          Labs personally reviewed      ASSESSMENT/PLAN: 	  78-year-old male multiple medical problems history of hepatocellular carcinoma status post radiation therapy, AF s/p DCCV on Eliquis who was found to be hypotensive following NST. Patient has reported general weakness, fatigue, lightheadedness since being discharged from hospital. Reports mild chest discomfort in midsternal region. Reports dyspnea with minimal exertion. Also reports significant lack of appetite and poor PO intake. No dark or bloody stool, nausea or vomiting.       Problem/Plan - 1:  ·  Problem: Hypotension  ·  Plan: s/p pressors in MICU.  Now transferred to 4[facility]  - Patient presents with hypotension and also RAZIA. Has known orthostatic hypotension.   - Patient and wife report decreased PO intake likely 2/2 malignancy.  - GIB 7/9, s/p multiple units prbc, IR for mesenteric embolization; Repeat CT A/P with no extravazation  - c/w Midodrine 10mg PO p0vndri  - CT A/P inconclusive     Problem/Plan - 2:  ·  Problem: CAD.   ·  Plan: Recent PCI to pLAD in June 2023  - ECG non-ischemic  - Plavix held given large melena; ideally should be on Plavix but high risk to resume      Problem/Plan - 3:  ·  Problem: Atrial Fibrillation  - s/p succesful DCCV 10/31  - Hold Eliquis 5mg BID given GIB  - s/p Amio  - c/w Dig 125mcg PO QOD  - Metoprolol 12.5mg BID     Problem/Plan - 4:  ·  Problem: Acute Hypoxic Respiratory Failure  - Now requiring 3L NC  - CT Chest shows B/L pleural effusion and atelectasis  - BNP 12K  - s/p Lasix 40mg IV once with albumin 8/9  - s/p IV administration of albumin 8/12    ROSIO Latham-NP   Hank Hayes DO [facility]  Cardiovascular Medicine  [address] [place] [address], [address]  Available through call or text on [employer]  Office: [phone]   DATE OF SERVICE: 08-13-24 @ 15:50    Patient is a 79y old  Male who presents with a chief complaint of hypotension (13 Aug 2024 11:15)      INTERVAL HISTORY: In no acute distress.     REVIEW OF SYSTEMS:  CONSTITUTIONAL: No weakness  EYES/ENT: No visual changes;  No throat pain   NECK: No pain or stiffness  RESPIRATORY: No cough, wheezing; No shortness of breath  CARDIOVASCULAR: No chest pain or palpitations  GASTROINTESTINAL: No abdominal  pain. No nausea, vomiting, or hematemesis  GENITOURINARY: No dysuria, frequency or hematuria  NEUROLOGICAL: No stroke like symptoms  SKIN: No rashes    TELEMETRY Personally reviewed: AF  with freq PVCs, Bigeminy, trigeminy  	  MEDICATIONS:  digoxin     Tablet 125 MICROGram(s) Oral every other day  metoprolol tartrate 12.5 milliGRAM(s) Oral two times a day  midodrine 10 milliGRAM(s) Oral every 8 hours        PHYSICAL EXAM:  T(C): 36.4 (08-13-24 @ 13:47), Max: 36.6 (08-13-24 @ 00:00)  HR: 80 (08-13-24 @ 13:47) (73 - 109)  BP: 122/71 (08-13-24 @ 13:47) (99/65 - 122/71)  RR: 20 (08-13-24 @ 13:47) (17 - 20)  SpO2: 98% (08-13-24 @ 13:47) (95% - 100%)  Wt(kg): --  I&O's Summary    12 Aug 2024 07:01  -  13 Aug 2024 07:00  --------------------------------------------------------  IN: 850 mL / OUT: 1300 mL / NET: -450 mL    13 Aug 2024 07:01  -  13 Aug 2024 15:50  --------------------------------------------------------  IN: 150 mL / OUT: 650 mL / NET: -500 mL          Appearance: In no distress	  HEENT:    PERRL, EOMI	  Cardiovascular:  S1 S2, No JVD  Respiratory: rales B/L   Gastrointestinal:  Soft, Non-tender, + BS	  Vascularature:  anasarca  Psychiatric: Appropriate affect   Neuro: no acute focal deficits                               7.7    6.69  )-----------( 103      ( 13 Aug 2024 10:28 )             24.9     08-13    143  |  109<H>  |  26<H>  ----------------------------<  120<H>  4.3   |  21<L>  |  0.95    Ca    8.1<L>      13 Aug 2024 10:28  Phos  3.5     08-13  Mg     1.9     08-13          Labs personally reviewed      ASSESSMENT/PLAN: 	  78-year-old male multiple medical problems history of hepatocellular carcinoma status post radiation therapy, AF s/p DCCV on Eliquis who was found to be hypotensive following NST. Patient has reported general weakness, fatigue, lightheadedness since being discharged from hospital. Reports mild chest discomfort in midsternal region. Reports dyspnea with minimal exertion. Also reports significant lack of appetite and poor PO intake. No dark or bloody stool, nausea or vomiting.       Problem/Plan - 1:  ·  Problem: Hypotension  ·  Plan: s/p pressors in MICU.  Now transferred to 4[facility]  - Patient presents with hypotension and also RAZIA. Has known orthostatic hypotension.   - Patient and wife report decreased PO intake likely 2/2 malignancy.  - GIB 7/9, s/p multiple units prbc, IR for mesenteric embolization; Repeat CT A/P with no extravazation  - c/w Midodrine 10mg PO [id number]  - CT A/P inconclusive     Problem/Plan - 2:  ·  Problem: CAD.   ·  Plan: Recent PCI to pLAD in June 2023  - ECG non-ischemic  - Plavix held given large melena; ideally should be on Plavix but high risk to resume      Problem/Plan - 3:  ·  Problem: Atrial Fibrillation  - s/p succesful DCCV 10/31  - Hold Eliquis 5mg BID given GIB  - s/p Amio  - c/w Dig 125mcg PO QOD  - Metoprolol 12.5mg BID     Problem/Plan - 4:  ·  Problem: Acute Hypoxic Respiratory Failure  - Now requiring 3L NC  - CT Chest shows B/L pleural effusion and atelectasis  - BNP 12K  - s/p Lasix 40mg IV once with albumin 8/9  - s/p IV administration of albumin 8/12        ROSIO Latham-NP   Hank Hayes DO [facility]  Cardiovascular Medicine  [address] [place] [address], [address]  Available through call or text on [employer]  Office: [phone]

## 2024-08-14 NOTE — PROGRESS NOTE ADULT - PROBLEM SELECTOR PLAN 4
Patient with chronic afib unable to be on AC for this and seemingly minimally responsive to amiodarone. Cardiology following, no obvious underlying cause aside from hemorrhage, which is difficult to control.    Plan:  - Digoxin 125mcg every other day  - Continue current digoxin dose per cardiology  - Holding eliquis

## 2024-08-14 NOTE — CHART NOTE - NSCHARTNOTEFT_GEN_A_CORE
Spoke with Yamilet via phone regarding f/u IDT family meeting. She requested a call on Friday to schedule family meeting next week when her family can be available. Stressed the importance of establishing GOC, the availability of conference call and FaceTime for those that cannot be there in person. She verbalized understanding. Emotional support provided.

## 2024-08-14 NOTE — PROGRESS NOTE ADULT - SUBJECTIVE AND OBJECTIVE BOX
DATE OF SERVICE: 08-14-24 @ 13:20    Patient is a 79y old  Male who presents with a chief complaint of hypotension (14 Aug 2024 09:56)      INTERVAL HISTORY: Feels ok.     REVIEW OF SYSTEMS:  CONSTITUTIONAL: No weakness  EYES/ENT: No visual changes;  No throat pain   NECK: No pain or stiffness  RESPIRATORY: No cough, wheezing; No shortness of breath  CARDIOVASCULAR: No chest pain or palpitations  GASTROINTESTINAL: No abdominal  pain. No nausea, vomiting, or hematemesis  GENITOURINARY: No dysuria, frequency or hematuria  NEUROLOGICAL: No stroke like symptoms  SKIN: No rashes    TELEMETRY Personally reviewed: AF/flutter with preq PVC/bigeminy/trigeminy and several short runs of WCT   	  MEDICATIONS:  buMETAnide Injectable 2 milliGRAM(s) IV Push <User Schedule>  digoxin     Tablet 125 MICROGram(s) Oral every other day  metoprolol tartrate 25 milliGRAM(s) Oral two times a day  midodrine 10 milliGRAM(s) Oral every 8 hours        PHYSICAL EXAM:  T(C): 36.3 (08-14-24 @ 12:04), Max: 36.8 (08-13-24 @ 16:08)  HR: 87 (08-14-24 @ 12:04) (77 - 107)  BP: 95/60 (08-14-24 @ 12:04) (95/60 - 139/66)  RR: 18 (08-14-24 @ 12:04) (18 - 20)  SpO2: 96% (08-14-24 @ 12:04) (95% - 99%)  Wt(kg): --  I&O's Summary    13 Aug 2024 07:01  -  14 Aug 2024 07:00  --------------------------------------------------------  IN: 150 mL / OUT: 1050 mL / NET: -900 mL    14 Aug 2024 07:01  -  14 Aug 2024 13:20  --------------------------------------------------------  IN: 80 mL / OUT: 0 mL / NET: 80 mL          Appearance: In no distress	  HEENT:    PERRL, EOMI	  Cardiovascular:  S1 S2, No JVD  Respiratory: Lungs clear to auscultation	  Gastrointestinal:  Soft, Non-tender, + BS	  Vascularature:  anasarca  Psychiatric: Appropriate affect   Neuro: no acute focal deficits                               9.0    7.00  )-----------( 101      ( 14 Aug 2024 10:51 )             28.9     08-14    145  |  107  |  28<H>  ----------------------------<  126<H>  4.1   |  22  |  1.16    Ca    8.1<L>      14 Aug 2024 10:51  Phos  3.6     08-14  Mg     2.0     08-14    TPro  5.2<L>  /  Alb  3.1<L>  /  TBili  0.8  /  DBili  x   /  AST  24  /  ALT  16  /  AlkPhos  189<H>  08-14        Labs personally reviewed      ASSESSMENT/PLAN: 	    78-year-old male multiple medical problems history of hepatocellular carcinoma status post radiation therapy, AF s/p DCCV on Eliquis who was found to be hypotensive following NST. Patient has reported general weakness, fatigue, lightheadedness since being discharged from hospital. Reports mild chest discomfort in midsternal region. Reports dyspnea with minimal exertion. Also reports significant lack of appetite and poor PO intake. No dark or bloody stool, nausea or vomiting.       Problem/Plan - 1:  ·  Problem: Hypotension  ·  Plan: s/p pressors in MICU.  Now transferred to [facility]  - Patient presents with hypotension and also RAZIA. Has known orthostatic hypotension.   - Patient and wife report decreased PO intake likely 2/2 malignancy.  - GIB 7/9, s/p multiple units prbc, IR for mesenteric embolization; Repeat CT A/P with no extravazation  - c/w Midodrine 10mg PO [id number]  - CT A/P inconclusive     Problem/Plan - 2:  ·  Problem: CAD.   ·  Plan: Recent PCI to pLAD in June 2023  - ECG non-ischemic  - Plavix held given large melena; ideally should be on Plavix but high risk to resume      Problem/Plan - 3:  ·  Problem: Atrial Fibrillation  - s/p succesful DCCV 10/31  - Hold Eliquis 5mg BID given GIB  - s/p Amio  - c/w Dig 125mcg PO QOD  - Metoprolol 25mg BID     Problem/Plan - 4:  ·  Problem: Acute Hypoxic Respiratory Failure  - Now requiring 3L NC  - CT Chest shows B/L pleural effusion and atelectasis  - BNP 12K  - s/p Lasix 40mg IV once with albumin 8/9  - s/p IV administration of albumin 8/12  - Agree with Bumex 2mg IV x 3 doses        Sulma Espinosa, AG-NP   Hank Hayes DO [facility]  Cardiovascular Medicine  [address] [place] [address], [address]  Available through call or text on [employer]  Office: [phone]

## 2024-08-14 NOTE — PROGRESS NOTE ADULT - ASSESSMENT
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 Transferred to floors with intermittent melena and Hgb drops; currently no acute surgical or GI intervention recommended. GOC discussions ongoing with palliative. 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 Transferred to floors with intermittent melena and Hgb drops; currently no acute surgical or GI intervention recommended. GOC discussions ongoing with palliative. Currently continuing to diurese.

## 2024-08-14 NOTE — PROGRESS NOTE ADULT - PROBLEM SELECTOR PLAN 6
Patient with episodes of NSVT throughout admission.    Plan:  - Replete electrolytes as necessary  - Continue to monitor Patient with episodes of NSVT throughout admission.    Plan:  - Increase metoprolol to 25mg bid  - Replete electrolytes as necessary  - Continue to monitor

## 2024-08-14 NOTE — PROGRESS NOTE ADULT - PROBLEM SELECTOR PLAN 2
Patient's profound edema likely 2/2 large volume in during this admission as well as malnutrition. Previous POCUS have not identified a cardiac cause.    Plan:  - Continue Albumin 25% 50mL q8hr today  - Give Bumex 2mg IV bid today; will continue further diuresis based on response and patient clinical status  - Strict I/Os Patient's profound edema likely 2/2 large volume in during this admission as well as malnutrition. Previous POCUS have not identified a cardiac cause. Patient currently with cough. XR today with similar bilateral pleural effusions.     Plan:  - Continue Albumin 25% 50mL q8hr today  - Give Bumex 2mg IV tid today; will continue if responding appropriately  - Strict I/Os  - Continue Mucinex  - Continue manual chest PT  - Consider duonebs

## 2024-08-14 NOTE — PROGRESS NOTE ADULT - PROBLEM SELECTOR PLAN 1
Patient with persistent UGIB with coffee ground emesis and melanotic stools multiple times requiring IR GDA embolization initially and then EGD s/p 3 clips for duodenal ulcer. Patient has been off of his aspirin and plavix for his very recent stent and Eliquis for his chronic afib since he has required so many repeat transfusions due to this active blood loss. The hemorrhagic shock seems to have temporarily resolved. Patient's Hgb continues to have intermittent drops requiring single transfusions of pRBCs. CTAP 8/4 and 8/8 showed no strong indications of active bleeding.    Plan:  - CBC continuing to show drops in hemoglobin, 7.7 today, given 1 pRBC  - Reached out to GI, because of no overt GI bleeding, suggested workup for non-GI causes of anemia  - Recheck CBC to assess response later today  - F/u PT/INR/fibrinogen today  - Continue midodrine 10mg q8; consider increasing if necessary with diuresis  - Protonix BID per GI  - Hold all AC and anti-platelet agents  - Continue CBC q24 unless having active bleeding  - Maintain T&S q48 hours, next ordered for tomorrow AM  - Transfuse Hgb <8  - Can consider iron chelation therapy given many transfusions Patient with persistent UGIB with coffee ground emesis and melanotic stools multiple times requiring IR GDA embolization initially and then EGD s/p 3 clips for duodenal ulcer. Patient has been off of his aspirin and plavix for his very recent stent and Eliquis for his chronic afib since he has required so many repeat transfusions due to this active blood loss. The hemorrhagic shock seems to have temporarily resolved. Patient's Hgb continues to have intermittent drops requiring single transfusions of pRBCs. CTAP 8/4 and 8/8 showed no strong indications of active bleeding.    Plan:  - Hgb responded appropriately to transfusion yesterday; remains stable today  - F/u fibrinogen; PT/INR normal ranges  - Continue midodrine 10mg q8 as necessary  - Protonix BID per GI  - Hold all AC and anti-platelet agents  - Continue CBC q24 unless having active bleeding  - Maintain T&S q48 hours (most recent 8/14)  - Transfuse Hgb <8  - Can consider iron chelation therapy given many transfusions

## 2024-08-14 NOTE — PROGRESS NOTE ADULT - PROBLEM SELECTOR PLAN 5
Likely 2/2 to GI bleeding.  Iron 89 (WNL), UIBC 48 (low), TIBC 138 (low), Sat 65% (high), Ferritin 998 (high)  Vitamin B12 WNL, Haptoglobin WNL    Plan:  - Continue to monitor

## 2024-08-14 NOTE — PROGRESS NOTE ADULT - PROBLEM SELECTOR PLAN 8
Patient with known pancreatic neuroendocrine tumor and was on lanreotide. Was later on radiotherapy but due to multiple hospitalizations, was unable to receive follow-up doses. Patient did have PET done in May that showed lesions suspicious for mets. CT also in May showed increased hepatic metastases.     Plan:  - Continue octreotide 250mcg TID

## 2024-08-14 NOTE — PROGRESS NOTE ADULT - PROBLEM SELECTOR PLAN 12
DVT: unable to be on anything but SCDs  Diet: pureed diet, mildly thickened liquids  GOC: Full code, palliative follow-up

## 2024-08-14 NOTE — PROGRESS NOTE ADULT - SUBJECTIVE AND OBJECTIVE BOX
Patient is a 79y old  Male who presents with a chief complaint of hypotension (14 Aug 2024 05:04)    Patient seen and examined  Sounds congested but refusing suction  No acute overnight events reported    MEDICATIONS  (STANDING):  albumin human 25% IVPB 50 milliLiter(s) IV Intermittent every 8 hours  AQUAPHOR (petrolatum Ointment) 1 Application(s) Topical two times a day  chlorhexidine 2% Cloths 1 Application(s) Topical <User Schedule>  dextrose 50% Injectable 25 Gram(s) IV Push once  digoxin     Tablet 125 MICROGram(s) Oral every other day  folic acid Injectable 1 milliGRAM(s) IV Push daily  guaiFENesin ER 1200 milliGRAM(s) Oral every 12 hours  insulin lispro (ADMELOG) corrective regimen sliding scale   SubCutaneous at bedtime  insulin lispro (ADMELOG) corrective regimen sliding scale   SubCutaneous three times a day before meals  metoprolol tartrate 25 milliGRAM(s) Oral two times a day  midodrine 10 milliGRAM(s) Oral every 8 hours  octreotide  Injectable 250 MICROGram(s) IV Push three times a day  pantoprazole  Injectable 40 milliGRAM(s) IV Push every 12 hours  sodium chloride 3%  Inhalation 4 milliLiter(s) Inhalation every 12 hours  thiamine Injectable 100 milliGRAM(s) IV Push daily    MEDICATIONS  (PRN):  ondansetron Injectable 4 milliGRAM(s) IV Push once PRN Nausea and/or Vomiting    Vital Signs Last 24 Hrs  T(C): 36.5 (14 Aug 2024 07:25), Max: 36.8 (13 Aug 2024 16:08)  T(F): 97.7 (14 Aug 2024 07:25), Max: 98.3 (13 Aug 2024 16:08)  HR: 106 (14 Aug 2024 07:25) (80 - 107)  BP: 112/54 (14 Aug 2024 07:25) (110/65 - 139/66)  BP(mean): --  RR: 20 (14 Aug 2024 07:25) (20 - 20)  SpO2: 95% (14 Aug 2024 07:25) (95% - 99%)    Parameters below as of 14 Aug 2024 07:25  Patient On (Oxygen Delivery Method): nasal cannula  O2 Flow (L/min): 2      PE  Awake, alert  Anicteric, MMM  RRR  2L NC, ronchus   No c/c                      9.0    6.72  )-----------( 97       ( 13 Aug 2024 17:07 )             28.8       08-13    143  |  109<H>  |  26<H>  ----------------------------<  120<H>  4.3   |  21<L>  |  0.95    Ca    8.1<L>      13 Aug 2024 10:28  Phos  3.5     08-13  Mg     1.9     08-13

## 2024-08-14 NOTE — PROGRESS NOTE ADULT - PROBLEM SELECTOR PLAN 7
Patient with CAD with multiple stents done as recently as 6/2024 but unable to be on DAPT due to persistent GIB.    Plan:  - At risk for stent closure, but difficult to manage due to persistent GIB and recent hemorrhagic shock with multiple events requiring frequent transfusions in just the past several weeks.  - Continue to hold anti-platelet agents

## 2024-08-14 NOTE — PROGRESS NOTE ADULT - ASSESSMENT
79 yo male with PMH of CAD s/p PCI x3 with most recent stent 6/12/24 on Plavix, chronic Afib on eliquis, orthostatic hypotension on midodrine, PAD, neuroendocrine tumor with RT currently on hold, recent admission for dx cath on 6/24/24 who presented for hypotension, admitted for GIB and hemorrhagic shock. Found to be bleeding from duodenum. Transferred to MICU, on pressors.     1. Pancreatic NET- metastatic   -- was on lanreotide then had POD in liver and started on peptide receptor radiotherapy (PRRT)- typically given every 8 weeks for 4 doses. He received one dose on 10/20/2023 and planned to get his 2nd dose at the end of December however his course was complicated by multiple hospitalizations that were noncancer related (decompensated heart failure).   -- 5/28/24 PET Dotatate (compared to 9/2023): several new somatostatin avid osseous lesions, some faintly sclerotic, suspicious for mets. Increased upper RP nodes, probably metastatic. Decreased intensely tracer avid right supradiaphragmatic and upper abdominal nodes, suspicious for metastasis. Redemonstrated intensely somatostatin avid bilobar hepatic lesions, consistent with metastases again morphologically difficult to delineate.   -- CT reviewed by [facility]: SINCE MAY 8 2024 INCREASED HEPATIC METASTASES, NEWLY SEEN PRIMARY TUMOR IN THE PANCREAS, and active bleeding within the duodenum with associated intraluminal hematoma.   -- chromogranin level is elevated at 2058, but no prior level at [facility] [place] for review   -- f/u with Dr. Sophia Prasad at [facility] [place] after discharge, no plan for chemotherapy inpatient, if clinically improves/stabilizes then can be considered for treatment outpatient  --Continue octreotide 250mcg TID    2. Duodenal bleed, Hemorrhagic shock  - Now transferred to medicine from MICU, s/p extubation 7/22. NG tube in place.  - CT w/ bleed in duodenum. GI called, EGD 7/9 -> S/p  IR embolization 7/9, intubated in MICU -> s/p extubation 7/15 -> intubated 7/18  - s/p multiple transfusions, fibrinogen low would recommend Cryo for < 100, but coags have improved as are essentially normal now so unlikely, probably was related to liver dysfunction.   - s/p repeat EGD 7/12 showing duodenal lesions w/clots and oozing, no clear targets for intervention and no active bleeding, purastat applied to all areas of oozing.   - S/p EGD 7/18: duodenal ulcer with active oozing, ulcer with visible vessel. Bleeding treated.   - s/p multiple transfusions, per GI, high risk for rebleed, will continue PPI.   - Repeat CT AP w/ No evidence of GI bleed.  Interval endoscopic versus surgical intervention with metallic streak artifact suggestive of surgical clips in the region of the proximal one third portions of the duodenum. Evaluation of the previously seen hematoma is limited secondary to this artifact. Moderate bilateral pleural effusions and associated compressive atelectasis, right greater than left, overall slightly increased from previous CT. Anasarca.  - Per IR, In the absences of any active extravasation on CTA there's no place to target for an embolization  - 7/29 sputum culture + Pseudomonas; RRT on 8/2 due to tachycardia, hypotension; febrile to 101. Currently on cefepime x 7 days  - Continue octreotide 250 mcg TID  - CT AP 8/4: No evidence of active intraluminal extravasation of contrast. No evidence of acute intraperitoneal or retroperitoneal hemorrhage  - Underwent swallow assessment, started mildly thick liquids  - 8/8 CTA a/p d/t reports of melena, H/H stable--> Limited evaluation for active gastrointestinal hemorrhage due to high density oral contrast within the colon and streak artifact from orthopedic hardware, embolization material, and duodenal clips. Question a focus of enhancement in the duodenum adjacent to the duodenal clips versus streak artifact, with active hemorrhage within the duodenum not excluded.    3. C. diff   - diarrhea resolved, off vanco    Anasarca  --albumin Q8, bumex BID    Will continue to follow.    Christen Rosales NP  Hematology/ Oncology  [facility]  [phone] (office)  [phone] (alt office)  Evenings and weekends please call MD on call or office

## 2024-08-14 NOTE — PROGRESS NOTE ADULT - SUBJECTIVE AND OBJECTIVE BOX
Medicine Progress Note      Patient is a 79y old  Male who presents with a chief complaint of hypotension (10 Aug 2024 10:34)      SUBJECTIVE / OVERNIGHT EVENTS:   ***    MEDICATIONS  (STANDING):  AQUAPHOR (petrolatum Ointment) 1 Application(s) Topical two times a day  chlorhexidine 2% Cloths 1 Application(s) Topical <User Schedule>  dextrose 50% Injectable 25 Gram(s) IV Push once  digoxin     Tablet 125 MICROGram(s) Oral every other day  folic acid Injectable 1 milliGRAM(s) IV Push daily  insulin lispro (ADMELOG) corrective regimen sliding scale   SubCutaneous three times a day before meals  insulin lispro (ADMELOG) corrective regimen sliding scale   SubCutaneous at bedtime  metoprolol tartrate 12.5 milliGRAM(s) Oral two times a day  midodrine 10 milliGRAM(s) Oral every 8 hours  octreotide  Injectable 250 MICROGram(s) IV Push three times a day  pantoprazole  Injectable 40 milliGRAM(s) IV Push every 12 hours  sodium chloride 3%  Inhalation 4 milliLiter(s) Inhalation every 12 hours  thiamine Injectable 100 milliGRAM(s) IV Push daily    MEDICATIONS  (PRN):    CAPILLARY BLOOD GLUCOSE      POCT Blood Glucose.: 128 mg/dL (10 Aug 2024 21:24)  POCT Blood Glucose.: 119 mg/dL (10 Aug 2024 17:48)  POCT Blood Glucose.: 159 mg/dL (10 Aug 2024 11:34)  POCT Blood Glucose.: 132 mg/dL (10 Aug 2024 07:43)    I&O's Summary    10 Aug 2024 07:01  -  11 Aug 2024 07:00  --------------------------------------------------------  IN: 360 mL / OUT: 1 mL / NET: 359 mL        PHYSICAL EXAM:  Vital Signs Last 24 Hrs  T(C): 36.7 (11 Aug 2024 04:25), Max: 36.8 (11 Aug 2024 00:01)  T(F): 98 (11 Aug 2024 04:25), Max: 98.2 (11 Aug 2024 00:01)  HR: 96 (11 Aug 2024 04:25) (90 - 102)  BP: 106/64 (11 Aug 2024 04:25) (100/68 - 106/64)  BP(mean): --  RR: 18 (11 Aug 2024 04:25) (18 - 18)  SpO2: 96% (11 Aug 2024 04:25) (95% - 100%)    Parameters below as of 11 Aug 2024 04:25  Patient On (Oxygen Delivery Method): nasal cannula  O2 Flow (L/min): 2    PHYSICAL EXAM:  GENERAL: NAD, ill-appearing, uncomfortable-appearing, has NC properly in place, cough  HEAD: Atraumatic, Normocephalic  EYES: EOMI, conjunctiva and sclera clear  NECK: Supple  CHEST/LUNG: Clear to auscultation bilaterally in anterior chest, No wheezes or crackles  HEART: Normal S1/S2; Irregular rhythm; No murmurs, rubs, or gallops  ABDOMEN: Soft, Nontender, edematous  EXTREMITIES: Profound pitting edema in lower extremities until hips, edematous in proximal extremities; pitting is deep but rebounds to quarter of depth within seconds  PSYCH: A&Ox1-2, able to state basic needs, intermittently not oriented to place or time  NEUROLOGY: no focal neurologic deficit      LABS:                        8.6    8.03  )-----------( 91       ( 10 Aug 2024 17:52 )             27.5     08-10    142  |  111<H>  |  21  ----------------------------<  147<H>  3.8   |  22  |  0.70    Ca    8.0<L>      10 Aug 2024 09:57  Phos  2.6     08-10  Mg     1.7     08-10              Urinalysis Basic - ( 10 Aug 2024 09:57 )    Color: x / Appearance: x / SG: x / pH: x  Gluc: 147 mg/dL / Ketone: x  / Bili: x / Urobili: x   Blood: x / Protein: x / Nitrite: x   Leuk Esterase: x / RBC: x / WBC x   Sq Epi: x / Non Sq Epi: x / Bacteria: x        SARS-CoV-2: NotDetec (11 Jul 2024 04:36)      RADIOLOGY & ADDITIONAL TESTS:  Imaging from Last 24 Hours: Medicine Progress Note      Patient is a 79y old  Male who presents with a chief complaint of hypotension (10 Aug 2024 10:34)      SUBJECTIVE / OVERNIGHT EVENTS:   Patient /66 overnight and midodrine held.    Today patient examined at bedside. He states that he feels okay. He is sleepy but has no complaints.    MEDICATIONS  (STANDING):  AQUAPHOR (petrolatum Ointment) 1 Application(s) Topical two times a day  chlorhexidine 2% Cloths 1 Application(s) Topical <User Schedule>  dextrose 50% Injectable 25 Gram(s) IV Push once  digoxin     Tablet 125 MICROGram(s) Oral every other day  folic acid Injectable 1 milliGRAM(s) IV Push daily  insulin lispro (ADMELOG) corrective regimen sliding scale   SubCutaneous three times a day before meals  insulin lispro (ADMELOG) corrective regimen sliding scale   SubCutaneous at bedtime  metoprolol tartrate 12.5 milliGRAM(s) Oral two times a day  midodrine 10 milliGRAM(s) Oral every 8 hours  octreotide  Injectable 250 MICROGram(s) IV Push three times a day  pantoprazole  Injectable 40 milliGRAM(s) IV Push every 12 hours  sodium chloride 3%  Inhalation 4 milliLiter(s) Inhalation every 12 hours  thiamine Injectable 100 milliGRAM(s) IV Push daily    MEDICATIONS  (PRN):    CAPILLARY BLOOD GLUCOSE      POCT Blood Glucose.: 128 mg/dL (10 Aug 2024 21:24)  POCT Blood Glucose.: 119 mg/dL (10 Aug 2024 17:48)  POCT Blood Glucose.: 159 mg/dL (10 Aug 2024 11:34)  POCT Blood Glucose.: 132 mg/dL (10 Aug 2024 07:43)    I&O's Summary    10 Aug 2024 07:01  -  11 Aug 2024 07:00  --------------------------------------------------------  IN: 360 mL / OUT: 1 mL / NET: 359 mL        PHYSICAL EXAM:  Vital Signs Last 24 Hrs  T(C): 36.7 (11 Aug 2024 04:25), Max: 36.8 (11 Aug 2024 00:01)  T(F): 98 (11 Aug 2024 04:25), Max: 98.2 (11 Aug 2024 00:01)  HR: 96 (11 Aug 2024 04:25) (90 - 102)  BP: 106/64 (11 Aug 2024 04:25) (100/68 - 106/64)  BP(mean): --  RR: 18 (11 Aug 2024 04:25) (18 - 18)  SpO2: 96% (11 Aug 2024 04:25) (95% - 100%)    Parameters below as of 11 Aug 2024 04:25  Patient On (Oxygen Delivery Method): nasal cannula  O2 Flow (L/min): 2    PHYSICAL EXAM:  GENERAL: NAD, ill-appearing, sleeping, has NC properly in place  HEAD: Atraumatic, Normocephalic  EYES: EOMI, conjunctiva and sclera clear  NECK: Supple  CHEST/LUNG: Upper airway sounds transmitted, No wheezes or crackles  HEART: Normal S1/S2; Irregular rhythm; No murmurs, rubs, or gallops  ABDOMEN: Soft, Nontender, edematous  EXTREMITIES: Profound pitting edema in lower extremities until hips, legs covered by ACE bandages today, edematous in proximal extremities; pitting is deep but rebounds to quarter of depth within seconds; overall slightly improved today  PSYCH: A&Ox1-2  NEUROLOGY: no focal neurologic deficit      LABS:                        8.6    8.03  )-----------( 91       ( 10 Aug 2024 17:52 )             27.5     08-10    142  |  111<H>  |  21  ----------------------------<  147<H>  3.8   |  22  |  0.70    Ca    8.0<L>      10 Aug 2024 09:57  Phos  2.6     08-10  Mg     1.7     08-10              Urinalysis Basic - ( 10 Aug 2024 09:57 )    Color: x / Appearance: x / SG: x / pH: x  Gluc: 147 mg/dL / Ketone: x  / Bili: x / Urobili: x   Blood: x / Protein: x / Nitrite: x   Leuk Esterase: x / RBC: x / WBC x   Sq Epi: x / Non Sq Epi: x / Bacteria: x        SARS-CoV-2: NotDetec (11 Jul 2024 04:36)      RADIOLOGY & ADDITIONAL TESTS:  Imaging from Last 24 Hours:

## 2024-08-14 NOTE — PROGRESS NOTE ADULT - PROBLEM SELECTOR PLAN 3
Patient with failure to thrive iso metastatic disease as well as repeated intubation and difficulty swallowing.  MBS 8/6 showing no gross aspiration, but still high-risk. Recommended pureed diet with mildly thickened liquids and aspiration precautions given patient and family desire PO diet. NG tube removed 8/6 after study.    Plan:  - Continue diet, to be supplemented with RD recs  - Monitor electrolytes as high risk for refeeding syndrome  - GOC meeting with family (wife, daughter) and palliative last week; further palliative follow-up this week with potential for multidisciplinary meeting

## 2024-08-14 NOTE — PROGRESS NOTE ADULT - ATTENDING COMMENTS
-Hgb stable today. C/w IV PPI BID.   -ACE wraps of legs and elevation. -C/w IV albumin with IV bumex 2mg TID today.   -CXR showed b/l pleural effusions. C/w diuresis. -Mucinex. Chest PT. Consider duonebs but don't want to affect HR too much.   -Metoprolol increased for better rate control.   -Palliative f/u appreciated.   -D/w house staff.

## 2024-08-15 NOTE — PROGRESS NOTE ADULT - SUBJECTIVE AND OBJECTIVE BOX
Patient is a 79y old  Male who presents with a chief complaint of hypotension (14 Aug 2024 13:20)      SUBJECTIVE / OVERNIGHT EVENTS:    MEDICATIONS  (STANDING):  AQUAPHOR (petrolatum Ointment) 1 Application(s) Topical two times a day  chlorhexidine 2% Cloths 1 Application(s) Topical <User Schedule>  dextrose 50% Injectable 25 Gram(s) IV Push once  digoxin     Tablet 125 MICROGram(s) Oral every other day  folic acid Injectable 1 milliGRAM(s) IV Push daily  guaiFENesin Oral Liquid (Sugar-Free) 200 milliGRAM(s) Oral every 6 hours  insulin lispro (ADMELOG) corrective regimen sliding scale   SubCutaneous at bedtime  insulin lispro (ADMELOG) corrective regimen sliding scale   SubCutaneous three times a day before meals  metoprolol tartrate 25 milliGRAM(s) Oral two times a day  midodrine 10 milliGRAM(s) Oral every 8 hours  octreotide  Injectable 250 MICROGram(s) IV Push three times a day  pantoprazole  Injectable 40 milliGRAM(s) IV Push every 12 hours  sodium chloride 3%  Inhalation 4 milliLiter(s) Inhalation every 12 hours  thiamine Injectable 100 milliGRAM(s) IV Push daily    MEDICATIONS  (PRN):  ondansetron Injectable 4 milliGRAM(s) IV Push once PRN Nausea and/or Vomiting      CAPILLARY BLOOD GLUCOSE      POCT Blood Glucose.: 136 mg/dL (14 Aug 2024 22:15)  POCT Blood Glucose.: 119 mg/dL (14 Aug 2024 16:22)  POCT Blood Glucose.: 136 mg/dL (14 Aug 2024 12:15)  POCT Blood Glucose.: 134 mg/dL (14 Aug 2024 08:01)    I&O's Summary    14 Aug 2024 07:01  -  15 Aug 2024 07:00  --------------------------------------------------------  IN: 130 mL / OUT: 931 mL / NET: -801 mL        Vital Signs Last 24 Hrs  T(C): 36.6 (15 Aug 2024 04:02), Max: 36.6 (14 Aug 2024 20:59)  T(F): 97.8 (15 Aug 2024 04:02), Max: 97.8 (14 Aug 2024 20:59)  HR: 91 (15 Aug 2024 04:02) (77 - 114)  BP: 122/70 (15 Aug 2024 04:02) (95/60 - 125/76)  BP(mean): --  RR: 20 (15 Aug 2024 04:02) (18 - 20)  SpO2: 96% (15 Aug 2024 04:02) (95% - 98%)    Parameters below as of 15 Aug 2024 04:02  Patient On (Oxygen Delivery Method): nasal cannula  O2 Flow (L/min): 2      PHYSICAL EXAM:  GENERAL: NAD, well-developed, well-nourished  HEAD: Atraumatic, Normocephalic  EYES: EOMI, PERRLA, conjunctiva and sclera clear  NECK: Supple, No JVD  CHEST/LUNG: Clear to auscultation bilaterally; No wheezes or crackles  HEART: Normal S1/S2; Regular rate and rhythm; No murmurs, rubs, or gallops  ABDOMEN: Soft, Nontender, Nondistended; Bowel sounds present  EXTREMITIES: 2+ Peripheral Pulses; No clubbing, cyanosis, or edema  PSYCH: A&Ox3  NEUROLOGY: no focal neurologic deficit  SKIN: No rashes or lesions    LABS:                        9.0    7.00  )-----------( 101      ( 14 Aug 2024 10:51 )             28.9      08-14    145  |  107  |  28<H>  ----------------------------<  126<H>  4.1   |  22  |  1.16    Ca    8.1<L>      14 Aug 2024 10:51  Phos  3.6     08-14  Mg     2.0     08-14    TPro  5.2<L>  /  Alb  3.1<L>  /  TBili  0.8  /  DBili  x   /  AST  24  /  ALT  16  /  AlkPhos  189<H>  08-14    PT/INR - ( 13 Aug 2024 10:28 )   PT: 12.3 sec;   INR: 1.12 ratio         PTT - ( 13 Aug 2024 10:28 )  PTT:28.0 sec      Urinalysis Basic - ( 14 Aug 2024 10:51 )    Color: x / Appearance: x / SG: x / pH: x  Gluc: 126 mg/dL / Ketone: x  / Bili: x / Urobili: x   Blood: x / Protein: x / Nitrite: x   Leuk Esterase: x / RBC: x / WBC x   Sq Epi: x / Non Sq Epi: x / Bacteria: x        RADIOLOGY & ADDITIONAL TESTS:    Imaging Personally Reviewed:    Consultant(s) Notes Reviewed:      Care Discussed with Consultants/Other Providers:   Patient is a 79y old  Male who presents with a chief complaint of hypotension (14 Aug 2024 13:20)      SUBJECTIVE / OVERNIGHT EVENTS: No acute events overnight. Patient seen and examined at bedside, unable to speak but engaging with head nodding and shaking. He denies pain, shortness of breath. Noted with productive cough. No further bleeding that he is aware of.     MEDICATIONS  (STANDING):  AQUAPHOR (petrolatum Ointment) 1 Application(s) Topical two times a day  chlorhexidine 2% Cloths 1 Application(s) Topical <User Schedule>  dextrose 50% Injectable 25 Gram(s) IV Push once  digoxin     Tablet 125 MICROGram(s) Oral every other day  folic acid Injectable 1 milliGRAM(s) IV Push daily  guaiFENesin Oral Liquid (Sugar-Free) 200 milliGRAM(s) Oral every 6 hours  insulin lispro (ADMELOG) corrective regimen sliding scale   SubCutaneous at bedtime  insulin lispro (ADMELOG) corrective regimen sliding scale   SubCutaneous three times a day before meals  metoprolol tartrate 25 milliGRAM(s) Oral two times a day  midodrine 10 milliGRAM(s) Oral every 8 hours  octreotide  Injectable 250 MICROGram(s) IV Push three times a day  pantoprazole  Injectable 40 milliGRAM(s) IV Push every 12 hours  sodium chloride 3%  Inhalation 4 milliLiter(s) Inhalation every 12 hours  thiamine Injectable 100 milliGRAM(s) IV Push daily    MEDICATIONS  (PRN):  ondansetron Injectable 4 milliGRAM(s) IV Push once PRN Nausea and/or Vomiting      CAPILLARY BLOOD GLUCOSE      POCT Blood Glucose.: 136 mg/dL (14 Aug 2024 22:15)  POCT Blood Glucose.: 119 mg/dL (14 Aug 2024 16:22)  POCT Blood Glucose.: 136 mg/dL (14 Aug 2024 12:15)  POCT Blood Glucose.: 134 mg/dL (14 Aug 2024 08:01)    I&O's Summary    14 Aug 2024 07:01  -  15 Aug 2024 07:00  --------------------------------------------------------  IN: 130 mL / OUT: 931 mL / NET: -801 mL        Vital Signs Last 24 Hrs  T(C): 36.6 (15 Aug 2024 04:02), Max: 36.6 (14 Aug 2024 20:59)  T(F): 97.8 (15 Aug 2024 04:02), Max: 97.8 (14 Aug 2024 20:59)  HR: 91 (15 Aug 2024 04:02) (77 - 114)  BP: 122/70 (15 Aug 2024 04:02) (95/60 - 125/76)  BP(mean): --  RR: 20 (15 Aug 2024 04:02) (18 - 20)  SpO2: 96% (15 Aug 2024 04:02) (95% - 98%)    Parameters below as of 15 Aug 2024 04:02  Patient On (Oxygen Delivery Method): nasal cannula  O2 Flow (L/min): 2      PHYSICAL EXAM:  GENERAL: NAD, ill-appearing  HEAD: Atraumatic, Normocephalic  EYES: EOMI, conjunctiva and sclera clear  NECK: Supple  CHEST/LUNG: CTAB, noted with productive cough with thick, opaque, light brown sputum  HEART: Normal S1/S2; Irregular rhythm; No murmurs, rubs, or gallops  ABDOMEN: Soft, Nontender, edematous  EXTREMITIES: Profound pitting edema in lower extremities until hips, legs covered by ACE bandages today, edematous in proximal extremities; pitting is deep but rebounds to quarter of depth within seconds; overall slightly improved today  PSYCH: A&Ox1-2  NEUROLOGY: no focal neurologic deficit    LABS:                        9.0    7.00  )-----------( 101      ( 14 Aug 2024 10:51 )             28.9      08-14    145  |  107  |  28<H>  ----------------------------<  126<H>  4.1   |  22  |  1.16    Ca    8.1<L>      14 Aug 2024 10:51  Phos  3.6     08-14  Mg     2.0     08-14    TPro  5.2<L>  /  Alb  3.1<L>  /  TBili  0.8  /  DBili  x   /  AST  24  /  ALT  16  /  AlkPhos  189<H>  08-14    PT/INR - ( 13 Aug 2024 10:28 )   PT: 12.3 sec;   INR: 1.12 ratio         PTT - ( 13 Aug 2024 10:28 )  PTT:28.0 sec      Urinalysis Basic - ( 14 Aug 2024 10:51 )    Color: x / Appearance: x / SG: x / pH: x  Gluc: 126 mg/dL / Ketone: x  / Bili: x / Urobili: x   Blood: x / Protein: x / Nitrite: x   Leuk Esterase: x / RBC: x / WBC x   Sq Epi: x / Non Sq Epi: x / Bacteria: x        RADIOLOGY & ADDITIONAL TESTS:    Imaging Personally Reviewed:    Consultant(s) Notes Reviewed:      Care Discussed with Consultants/Other Providers:

## 2024-08-15 NOTE — PROGRESS NOTE ADULT - PROBLEM SELECTOR PLAN 1
Patient with persistent UGIB with coffee ground emesis and melanotic stools multiple times requiring IR GDA embolization initially and then EGD s/p 3 clips for duodenal ulcer. Patient has been off of his aspirin and plavix for his very recent stent and Eliquis for his chronic afib since he has required so many repeat transfusions due to this active blood loss. The hemorrhagic shock seems to have temporarily resolved. Patient's Hgb continues to have intermittent drops requiring single transfusions of pRBCs. CTAP 8/4 and 8/8 showed no strong indications of active bleeding.    Plan:  - Hgb responded appropriately to transfusion yesterday; remains stable today  - F/u fibrinogen; PT/INR normal ranges  - Continue midodrine 10mg q8 as necessary  - Protonix BID per GI  - Hold all AC and anti-platelet agents  - Continue CBC q24 unless having active bleeding  - Maintain T&S q48 hours (most recent 8/14)  - Transfuse Hgb <8  - Can consider iron chelation therapy given many transfusions

## 2024-08-15 NOTE — PROGRESS NOTE ADULT - NS ATTEND AMEND GEN_ALL_CORE FT
continuing octreotide. hg remains stable. overall ps is poor Agree with above assessment and plan.  Continue with octreotide. Monitor hemoglobin, last PRBC on 8/13. Overall poor prognosis

## 2024-08-15 NOTE — PROGRESS NOTE ADULT - SUBJECTIVE AND OBJECTIVE BOX
DATE OF SERVICE: 08-15-24 @ 10:17    Patient is a 79y old  Male who presents with a chief complaint of hypotension (15 Aug 2024 07:02)      INTERVAL HISTORY: In no acute distress.     REVIEW OF SYSTEMS:  CONSTITUTIONAL: No weakness  EYES/ENT: No visual changes;  No throat pain   NECK: No pain or stiffness  RESPIRATORY: No cough, wheezing; No shortness of breath  CARDIOVASCULAR: No chest pain or palpitations  GASTROINTESTINAL: No abdominal  pain. No nausea, vomiting, or hematemesis  GENITOURINARY: No dysuria, frequency or hematuria  NEUROLOGICAL: No stroke like symptoms  SKIN: No rashes    TELEMETRY Personally reviewed: AF 70-120s with frequent ventricular ectopy and multiple short runs of WCT  	  MEDICATIONS:  digoxin     Tablet 125 MICROGram(s) Oral every other day  metoprolol tartrate 25 milliGRAM(s) Oral two times a day  midodrine 10 milliGRAM(s) Oral every 8 hours        PHYSICAL EXAM:  T(C): 36.3 (08-15-24 @ 08:20), Max: 36.6 (08-14-24 @ 20:59)  HR: 90 (08-15-24 @ 08:20) (77 - 114)  BP: 114/70 (08-15-24 @ 08:20) (95/60 - 125/76)  RR: 19 (08-15-24 @ 08:20) (18 - 20)  SpO2: 97% (08-15-24 @ 08:20) (96% - 98%)  Wt(kg): --  I&O's Summary    14 Aug 2024 07:01  -  15 Aug 2024 07:00  --------------------------------------------------------  IN: 130 mL / OUT: 931 mL / NET: -801 mL          Appearance: In no distress	  HEENT:    PERRL, EOMI	  Cardiovascular:  S1 S2, No JVD  Respiratory: b/l rhonchi  Gastrointestinal:  Soft, Non-tender, + BS	  Vascularature:  anasarca (improving)  Psychiatric: Appropriate affect   Neuro: no acute focal deficits                               9.0    7.00  )-----------( 101      ( 14 Aug 2024 10:51 )             28.9     08-14    145  |  107  |  28<H>  ----------------------------<  126<H>  4.1   |  22  |  1.16    Ca    8.1<L>      14 Aug 2024 10:51  Phos  3.6     08-14  Mg     2.0     08-14    TPro  5.2<L>  /  Alb  3.1<L>  /  TBili  0.8  /  DBili  x   /  AST  24  /  ALT  16  /  AlkPhos  189<H>  08-14        Labs personally reviewed      ASSESSMENT/PLAN: 	    78-year-old male multiple medical problems history of hepatocellular carcinoma status post radiation therapy, AF s/p DCCV on Eliquis who was found to be hypotensive following NST. Patient has reported general weakness, fatigue, lightheadedness since being discharged from hospital. Reports mild chest discomfort in midsternal region. Reports dyspnea with minimal exertion. Also reports significant lack of appetite and poor PO intake. No dark or bloody stool, nausea or vomiting.       Problem/Plan - 1:  ·  Problem: Hypotension  ·  Plan: s/p pressors in MICU.  Now transferred to [facility]  - Patient presents with hypotension and also RAZIA. Has known orthostatic hypotension.   - Patient and wife report decreased PO intake likely 2/2 malignancy.  - GIB 7/9, s/p multiple units prbc, IR for mesenteric embolization; Repeat CT A/P with no extravazation  - c/w Midodrine 10mg PO [id number]  - CT A/P inconclusive     Problem/Plan - 2:  ·  Problem: CAD.   ·  Plan: Recent PCI to pLAD in June 2023  - ECG non-ischemic  - Plavix held given large melena; ideally should be on Plavix but high risk to resume      Problem/Plan - 3:  ·  Problem: Atrial Fibrillation  - s/p succesful DCCV 10/31  - Hold Eliquis 5mg BID given GIB  - s/p Amio  - c/w Dig 125mcg PO QOD--> dig level low. Can consider increasing frequency to daily  - Metoprolol 25mg BID     Problem/Plan - 4:  ·  Problem: Acute Hypoxic Respiratory Failure  - Now requiring 3L NC  - CT Chest shows B/L pleural effusion and atelectasis  - BNP 12K  - s/p Lasix 40mg IV once with albumin 8/9  - s/p IV administration of albumin 8/12  - s/p Bumex 2mg IV x 3 doses 8/14 with improvement in volume status--> recommend Bumex 2mg IV daily         ROSIO Latham-NP   Hank Hayes DO [facility]  Cardiovascular Medicine  [address] [place] [address], [address]  Available through call or text on [employer]  Office: [phone]   DATE OF SERVICE: 08-15-24 @ 10:17    Patient is a 79y old  Male who presents with a chief complaint of hypotension (15 Aug 2024 07:02)      INTERVAL HISTORY: In no acute distress.     REVIEW OF SYSTEMS:  CONSTITUTIONAL: No weakness  EYES/ENT: No visual changes;  No throat pain   NECK: No pain or stiffness  RESPIRATORY: No cough, wheezing; No shortness of breath  CARDIOVASCULAR: No chest pain or palpitations  GASTROINTESTINAL: No abdominal  pain. No nausea, vomiting, or hematemesis  GENITOURINARY: No dysuria, frequency or hematuria  NEUROLOGICAL: No stroke like symptoms  SKIN: No rashes    TELEMETRY Personally reviewed: AF 70-120s with frequent ventricular ectopy and multiple short runs of WCT  	  MEDICATIONS:  digoxin     Tablet 125 MICROGram(s) Oral every other day  metoprolol tartrate 25 milliGRAM(s) Oral two times a day  midodrine 10 milliGRAM(s) Oral every 8 hours        PHYSICAL EXAM:  T(C): 36.3 (08-15-24 @ 08:20), Max: 36.6 (08-14-24 @ 20:59)  HR: 90 (08-15-24 @ 08:20) (77 - 114)  BP: 114/70 (08-15-24 @ 08:20) (95/60 - 125/76)  RR: 19 (08-15-24 @ 08:20) (18 - 20)  SpO2: 97% (08-15-24 @ 08:20) (96% - 98%)  Wt(kg): --  I&O's Summary    14 Aug 2024 07:01  -  15 Aug 2024 07:00  --------------------------------------------------------  IN: 130 mL / OUT: 931 mL / NET: -801 mL          Appearance: In no distress	  HEENT:    PERRL, EOMI	  Cardiovascular:  S1 S2, No JVD  Respiratory: b/l rhonchi  Gastrointestinal:  Soft, Non-tender, + BS	  Vascularature:  anasarca (improving)  Psychiatric: Appropriate affect   Neuro: no acute focal deficits                               9.0    7.00  )-----------( 101      ( 14 Aug 2024 10:51 )             28.9     08-14    145  |  107  |  28<H>  ----------------------------<  126<H>  4.1   |  22  |  1.16    Ca    8.1<L>      14 Aug 2024 10:51  Phos  3.6     08-14  Mg     2.0     08-14    TPro  5.2<L>  /  Alb  3.1<L>  /  TBili  0.8  /  DBili  x   /  AST  24  /  ALT  16  /  AlkPhos  189<H>  08-14        Labs personally reviewed      ASSESSMENT/PLAN: 	    78-year-old male multiple medical problems history of hepatocellular carcinoma status post radiation therapy, AF s/p DCCV on Eliquis who was found to be hypotensive following NST. Patient has reported general weakness, fatigue, lightheadedness since being discharged from hospital. Reports mild chest discomfort in midsternal region. Reports dyspnea with minimal exertion. Also reports significant lack of appetite and poor PO intake. No dark or bloody stool, nausea or vomiting.       Problem/Plan - 1:  ·  Problem: Hypotension  ·  Plan: s/p pressors in MICU.  Now transferred to [facility]  - Patient presents with hypotension and also RAZIA. Has known orthostatic hypotension.   - Patient and wife report decreased PO intake likely 2/2 malignancy.  - GIB 7/9, s/p multiple units prbc, IR for mesenteric embolization; Repeat CT A/P with no extravazation  - c/w Midodrine 10mg PO [id number]  - CT A/P inconclusive     Problem/Plan - 2:  ·  Problem: CAD.   ·  Plan: Recent PCI to pLAD in June 2023  - ECG non-ischemic  - Plavix held given large melena; ideally should be on Plavix but high risk to resume      Problem/Plan - 3:  ·  Problem: Atrial Fibrillation  - s/p succesful DCCV 10/31  - Hold Eliquis 5mg BID given GIB  - s/p Amio  - c/w Dig 125mcg PO QOD--> dig level low. Can consider increasing frequency to daily  - Metoprolol 25mg BID     Problem/Plan - 4:  ·  Problem: Acute Hypoxic Respiratory Failure  - Now requiring 3L NC  - CT Chest shows B/L pleural effusion and atelectasis  - BNP 12K  - s/p Lasix 40mg IV once with albumin 8/9  - s/p IV administration of albumin 8/12  - s/p Bumex 2mg IV x 3 doses 8/14 with improvement in volume status--> recommend Bumex 2mg IV daily         ROSIO Latham-NP   Hank Hayes DO [facility]  Cardiovascular Medicine  [address] [place] [address], [address]  Available through call or text on [employer]  Office: [phone]

## 2024-08-15 NOTE — PROGRESS NOTE ADULT - ATTENDING COMMENTS
Infectious Disease Critical Care -Edema appears a bit improved s/p albumin and bumex. Gave additional albumin x 1 today and 2mg iv bumex x 1 today. -Albumin now 3.1. -Will go to bumex 2mg IV daily for now.   -Cards appreciated; f/u digoxin level in am given slight rise in Cr prior to increasing dose.   -Hgb stable today. C/w IV PPI BID.   -Guaifenesin for cough. Chest PT. Diuresis. -Sputum culture. CXR showed effusions.   -D/w house staff.

## 2024-08-15 NOTE — PROGRESS NOTE ADULT - ASSESSMENT
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 Transferred to floors with intermittent melena and Hgb drops; currently no acute surgical or GI intervention recommended. GOC discussions ongoing with palliative. Currently continuing to diurese.

## 2024-08-15 NOTE — PROGRESS NOTE ADULT - PROBLEM SELECTOR PLAN 2
Patient's profound edema likely 2/2 large volume in during this admission as well as malnutrition. Previous POCUS have not identified a cardiac cause. Patient currently with cough. XR today with similar bilateral pleural effusions.     Plan:  - Continue Albumin 25% 50mL q8hr today  - Give Bumex 2mg IV tid today; will continue if responding appropriately  - Strict I/Os  - Continue Mucinex  - Continue manual chest PT  - Consider duonebs Patient's profound edema likely 2/2 large volume in during this admission as well as malnutrition. Previous POCUS have not identified a cardiac cause. Patient currently with cough. XR today with similar bilateral pleural effusions.     Plan:  - Continue Albumin 25% 50mL q8hr   - Give Bumex 2mg IV tid today - SCr stable  - Strict I/Os  - Continue Mucinex  - Continue manual chest PT  - Consider duonebs

## 2024-08-15 NOTE — PROGRESS NOTE ADULT - SUBJECTIVE AND OBJECTIVE BOX
Patient is a 79y old  Male who presents with a chief complaint of hypotension (15 Aug 2024 10:17)          MEDICATIONS  (STANDING):  albumin human 25% IVPB 50 milliLiter(s) IV Intermittent every 8 hours  AQUAPHOR (petrolatum Ointment) 1 Application(s) Topical two times a day  buMETAnide Injectable 2 milliGRAM(s) IV Push <User Schedule>  chlorhexidine 2% Cloths 1 Application(s) Topical <User Schedule>  dextrose 50% Injectable 25 Gram(s) IV Push once  digoxin     Tablet 125 MICROGram(s) Oral every other day  folic acid Injectable 1 milliGRAM(s) IV Push daily  guaiFENesin Oral Liquid (Sugar-Free) 300 milliGRAM(s) Oral every 6 hours  insulin lispro (ADMELOG) corrective regimen sliding scale   SubCutaneous at bedtime  insulin lispro (ADMELOG) corrective regimen sliding scale   SubCutaneous three times a day before meals  metoprolol tartrate 25 milliGRAM(s) Oral two times a day  midodrine 10 milliGRAM(s) Oral every 8 hours  octreotide  Injectable 250 MICROGram(s) IV Push three times a day  pantoprazole  Injectable 40 milliGRAM(s) IV Push every 12 hours  sodium chloride 3%  Inhalation 4 milliLiter(s) Inhalation every 12 hours  thiamine Injectable 100 milliGRAM(s) IV Push daily    MEDICATIONS  (PRN):  ondansetron Injectable 4 milliGRAM(s) IV Push once PRN Nausea and/or Vomiting      ROS  No fever, sweats, chills  No epistaxis, HA, sore throat  No CP, SOB, cough, sputum  No n/v/d, abd pain, melena, hematochezia  No edema  No rash  No anxiety  No back pain, joint pain  No bleeding, bruising  No dysuria, hematuria    Vital Signs Last 24 Hrs  T(C): 36.3 (15 Aug 2024 11:35), Max: 36.6 (14 Aug 2024 20:59)  T(F): 97.4 (15 Aug 2024 11:35), Max: 97.8 (14 Aug 2024 20:59)  HR: 73 (15 Aug 2024 14:33) (73 - 106)  BP: 109/67 (15 Aug 2024 14:33) (103/65 - 125/76)  BP(mean): --  RR: 18 (15 Aug 2024 11:35) (18 - 20)  SpO2: 98% (15 Aug 2024 11:35) (96% - 98%)    Parameters below as of 15 Aug 2024 11:35  Patient On (Oxygen Delivery Method): nasal cannula  O2 Flow (L/min): 2      PE  NAD  Awake, alert  Anicteric, MMM  RRR  CTAB  Abd soft, NT, ND  No c/c/e  No rash grossly  FROM                          8.9    6.15  )-----------( 114      ( 15 Aug 2024 11:27 )             28.6       08-15    145  |  107  |  27<H>  ----------------------------<  119<H>  3.6   |  22  |  1.17    Ca    8.2<L>      15 Aug 2024 11:27  Phos  3.4     08-15  Mg     1.8     08-15    TPro  5.3<L>  /  Alb  3.1<L>  /  TBili  0.8  /  DBili  0.3  /  AST  30  /  ALT  15  /  AlkPhos  191<H>  08-15

## 2024-08-15 NOTE — PROGRESS NOTE ADULT - ASSESSMENT
79 yo male with PMH of CAD s/p PCI x3 with most recent stent 6/12/24 on Plavix, chronic Afib on eliquis, orthostatic hypotension on midodrine, PAD, neuroendocrine tumor with RT currently on hold, recent admission for dx cath on 6/24/24 who presented for hypotension, admitted for GIB and hemorrhagic shock. Found to be bleeding from duodenum. Transferred from MICU to floors, off pressors.     1. Pancreatic NET- metastatic   -- was on lanreotide then had POD in liver and started on peptide receptor radiotherapy (PRRT)- typically given every 8 weeks for 4 doses. He received one dose on 10/20/2023 and planned to get his 2nd dose at the end of December however his course was complicated by multiple hospitalizations that were noncancer related (decompensated heart failure).   -- 5/28/24 PET Dotatate (compared to 9/2023): several new somatostatin avid osseous lesions, some faintly sclerotic, suspicious for mets. Increased upper RP nodes, probably metastatic. Decreased intensely tracer avid right supradiaphragmatic and upper abdominal nodes, suspicious for metastasis. Redemonstrated intensely somatostatin avid bilobar hepatic lesions, consistent with metastases again morphologically difficult to delineate.   -- CT reviewed by [facility]: SINCE MAY 8 2024 INCREASED HEPATIC METASTASES, NEWLY SEEN PRIMARY TUMOR IN THE PANCREAS, and active bleeding within the duodenum with associated intraluminal hematoma.   -- chromogranin level is elevated at 2058, but no prior level at [facility] for review   -- f/u with Dr. Sophia Prasad at [facility] after discharge, no plan for chemotherapy inpatient, if clinically improves/stabilizes then can be considered for treatment outpatient  --Continue octreotide 250mcg TID    2. Duodenal bleed, Hemorrhagic shock  - Now transferred to medicine from MICU, s/p extubation 7/22. NG tube in place.  - CT w/ bleed in duodenum. GI called, EGD 7/9 -> S/p  IR embolization 7/9, intubated in MICU -> s/p extubation 7/15 -> intubated 7/18  - s/p multiple transfusions, fibrinogen low would recommend Cryo for < 100, but coags have improved as are essentially normal now so unlikely, probably was related to liver dysfunction.   - s/p repeat EGD 7/12 showing duodenal lesions w/clots and oozing, no clear targets for intervention and no active bleeding, purastat applied to all areas of oozing.   - S/p EGD 7/18: duodenal ulcer with active oozing, ulcer with visible vessel. Bleeding treated.   - s/p multiple transfusions, per GI, high risk for rebleed, will continue PPI.   - Repeat CT AP on w/ No evidence of GI bleed.  Interval endoscopic versus surgical intervention with metallic streak artifact suggestive of surgical clips in the region of the proximal one third portions of the duodenum. Evaluation of the previously seen hematoma is limited secondary to this artifact. Moderate bilateral pleural effusions and associated compressive atelectasis, right greater than left, overall slightly increased from previous CT. Anasarca.  - Per IR, In the absences of any active extravasation on CTA there's no place to target for an embolization  - 7/29 sputum culture + Pseudomonas; RRT on 8/2 due to tachycardia, hypotension; febrile to 101. Currently on cefepime x 7 days  - Continue octreotide 250 mcg TID  - CT AP 8/4: No evidence of active intraluminal extravasation of contrast. No evidence of acute intraperitoneal or retroperitoneal hemorrhage  - Underwent swallow assessment, started mildly thick liquids  - 8/8 CTA a/p d/t reports of melena, H/H stable--> Limited evaluation for active gastrointestinal hemorrhage due to high density oral contrast within the colon and streak artifact from orthopedic hardware, embolization material, and duodenal clips. Question a focus of enhancement in the duodenum adjacent to the duodenal clips versus streak artifact, with active hemorrhage within the duodenum not excluded.    3. C. diff   - diarrhea resolved, off vanco    Anasarca  --albumin Q8, bumex BID    Will continue to follow.    Gaby Randle NP  Hematology/ Oncology  [facility]  [phone] (office)  [phone] (alt office)  Evenings and weekends please call MD on call or office

## 2024-08-15 NOTE — PROGRESS NOTE ADULT - PROBLEM SELECTOR PLAN 6
Patient with episodes of NSVT throughout admission.    Plan:  - Increase metoprolol to 25mg bid  - Replete electrolytes as necessary  - Continue to monitor

## 2024-08-16 ENCOUNTER — RX RENEWAL (OUTPATIENT)
Age: 79
End: 2024-08-16

## 2024-08-16 NOTE — PROGRESS NOTE ADULT - PROBLEM SELECTOR PLAN 5
Patient with chronic afib unable to be on AC for this and seemingly minimally responsive to amiodarone. Cardiology following, no obvious underlying cause aside from hemorrhage, which is difficult to control.    Plan:  - Dig level 0.6 (low) 8/12  - Digoxin 125mcg every other day; per cardiology, consider increasing frequency to daily given low level  - Holding eliquis Patient with chronic afib unable to be on AC for this and seemingly minimally responsive to amiodarone. Cardiology following, no obvious underlying cause aside from hemorrhage, which is difficult to control.    Plan:  - Dig level 0.6 (low) 8/12  - F/u dig level this morning  - Digoxin 125mcg every other day; per cardiology, consider increasing frequency to daily given low level  - Holding eliquis Patient with chronic afib unable to be on AC for this and seemingly minimally responsive to amiodarone. Cardiology following, no obvious underlying cause aside from hemorrhage, which is difficult to control.    Plan:  - Dig level 0.6 (low) 8/12. 0.7 (low) 8/16  - Digoxin 125mcg every day  - Check trough in 3 days (not within 6 hours of giving dose)  - Holding eliquis Patient with chronic afib unable to be on AC for this and seemingly minimally responsive to amiodarone. Cardiology following, no obvious underlying cause aside from hemorrhage, which is difficult to control.    Plan:  - Dig level 0.6 (low) 8/12. 0.7 (low) 8/16  - Increase in digoxin to 125mcg every day  - Check trough in 3 days (not within 6 hours of giving dose)  - Holding eliquis

## 2024-08-16 NOTE — PROGRESS NOTE ADULT - SUBJECTIVE AND OBJECTIVE BOX
Patient is a 79y old  Male who presents with a chief complaint of hypotension (14 Aug 2024 13:20)      SUBJECTIVE / OVERNIGHT EVENTS:       MEDICATIONS  (STANDING):  AQUAPHOR (petrolatum Ointment) 1 Application(s) Topical two times a day  chlorhexidine 2% Cloths 1 Application(s) Topical <User Schedule>  dextrose 50% Injectable 25 Gram(s) IV Push once  digoxin     Tablet 125 MICROGram(s) Oral every other day  folic acid Injectable 1 milliGRAM(s) IV Push daily  guaiFENesin Oral Liquid (Sugar-Free) 200 milliGRAM(s) Oral every 6 hours  insulin lispro (ADMELOG) corrective regimen sliding scale   SubCutaneous at bedtime  insulin lispro (ADMELOG) corrective regimen sliding scale   SubCutaneous three times a day before meals  metoprolol tartrate 25 milliGRAM(s) Oral two times a day  midodrine 10 milliGRAM(s) Oral every 8 hours  octreotide  Injectable 250 MICROGram(s) IV Push three times a day  pantoprazole  Injectable 40 milliGRAM(s) IV Push every 12 hours  sodium chloride 3%  Inhalation 4 milliLiter(s) Inhalation every 12 hours  thiamine Injectable 100 milliGRAM(s) IV Push daily    MEDICATIONS  (PRN):  ondansetron Injectable 4 milliGRAM(s) IV Push once PRN Nausea and/or Vomiting      CAPILLARY BLOOD GLUCOSE      POCT Blood Glucose.: 136 mg/dL (14 Aug 2024 22:15)  POCT Blood Glucose.: 119 mg/dL (14 Aug 2024 16:22)  POCT Blood Glucose.: 136 mg/dL (14 Aug 2024 12:15)  POCT Blood Glucose.: 134 mg/dL (14 Aug 2024 08:01)    I&O's Summary    14 Aug 2024 07:01  -  15 Aug 2024 07:00  --------------------------------------------------------  IN: 130 mL / OUT: 931 mL / NET: -801 mL        Vital Signs Last 24 Hrs  T(C): 36.6 (15 Aug 2024 04:02), Max: 36.6 (14 Aug 2024 20:59)  T(F): 97.8 (15 Aug 2024 04:02), Max: 97.8 (14 Aug 2024 20:59)  HR: 91 (15 Aug 2024 04:02) (77 - 114)  BP: 122/70 (15 Aug 2024 04:02) (95/60 - 125/76)  BP(mean): --  RR: 20 (15 Aug 2024 04:02) (18 - 20)  SpO2: 96% (15 Aug 2024 04:02) (95% - 98%)    Parameters below as of 15 Aug 2024 04:02  Patient On (Oxygen Delivery Method): nasal cannula  O2 Flow (L/min): 2      PHYSICAL EXAM:  GENERAL: NAD, ill-appearing  HEAD: Atraumatic, Normocephalic  EYES: EOMI, conjunctiva and sclera clear  NECK: Supple  CHEST/LUNG: CTAB, noted with productive cough with thick, opaque, light brown sputum  HEART: Normal S1/S2; Irregular rhythm; No murmurs, rubs, or gallops  ABDOMEN: Soft, Nontender, edematous  EXTREMITIES: Profound pitting edema in lower extremities until hips, legs covered by ACE bandages today, edematous in proximal extremities; pitting is deep but rebounds to quarter of depth within seconds; overall slightly improved today  PSYCH: A&Ox1-2  NEUROLOGY: no focal neurologic deficit    LABS:                        9.0    7.00  )-----------( 101      ( 14 Aug 2024 10:51 )             28.9      08-14    145  |  107  |  28<H>  ----------------------------<  126<H>  4.1   |  22  |  1.16    Ca    8.1<L>      14 Aug 2024 10:51  Phos  3.6     08-14  Mg     2.0     08-14    TPro  5.2<L>  /  Alb  3.1<L>  /  TBili  0.8  /  DBili  x   /  AST  24  /  ALT  16  /  AlkPhos  189<H>  08-14    PT/INR - ( 13 Aug 2024 10:28 )   PT: 12.3 sec;   INR: 1.12 ratio         PTT - ( 13 Aug 2024 10:28 )  PTT:28.0 sec      Urinalysis Basic - ( 14 Aug 2024 10:51 )    Color: x / Appearance: x / SG: x / pH: x  Gluc: 126 mg/dL / Ketone: x  / Bili: x / Urobili: x   Blood: x / Protein: x / Nitrite: x   Leuk Esterase: x / RBC: x / WBC x   Sq Epi: x / Non Sq Epi: x / Bacteria: x        RADIOLOGY & ADDITIONAL TESTS:    Imaging Personally Reviewed:    Consultant(s) Notes Reviewed:      Care Discussed with Consultants/Other Providers:   Patient is a 79y old  Male who presents with a chief complaint of hypotension (14 Aug 2024 13:20)      SUBJECTIVE / OVERNIGHT EVENTS:   HR up to 130s on tele overnight.    MEDICATIONS  (STANDING):  AQUAPHOR (petrolatum Ointment) 1 Application(s) Topical two times a day  chlorhexidine 2% Cloths 1 Application(s) Topical <User Schedule>  dextrose 50% Injectable 25 Gram(s) IV Push once  digoxin     Tablet 125 MICROGram(s) Oral every other day  folic acid Injectable 1 milliGRAM(s) IV Push daily  guaiFENesin Oral Liquid (Sugar-Free) 200 milliGRAM(s) Oral every 6 hours  insulin lispro (ADMELOG) corrective regimen sliding scale   SubCutaneous at bedtime  insulin lispro (ADMELOG) corrective regimen sliding scale   SubCutaneous three times a day before meals  metoprolol tartrate 25 milliGRAM(s) Oral two times a day  midodrine 10 milliGRAM(s) Oral every 8 hours  octreotide  Injectable 250 MICROGram(s) IV Push three times a day  pantoprazole  Injectable 40 milliGRAM(s) IV Push every 12 hours  sodium chloride 3%  Inhalation 4 milliLiter(s) Inhalation every 12 hours  thiamine Injectable 100 milliGRAM(s) IV Push daily    MEDICATIONS  (PRN):  ondansetron Injectable 4 milliGRAM(s) IV Push once PRN Nausea and/or Vomiting      CAPILLARY BLOOD GLUCOSE      POCT Blood Glucose.: 136 mg/dL (14 Aug 2024 22:15)  POCT Blood Glucose.: 119 mg/dL (14 Aug 2024 16:22)  POCT Blood Glucose.: 136 mg/dL (14 Aug 2024 12:15)  POCT Blood Glucose.: 134 mg/dL (14 Aug 2024 08:01)    I&O's Summary    14 Aug 2024 07:01  -  15 Aug 2024 07:00  --------------------------------------------------------  IN: 130 mL / OUT: 931 mL / NET: -801 mL        Vital Signs Last 24 Hrs  T(C): 36.6 (15 Aug 2024 04:02), Max: 36.6 (14 Aug 2024 20:59)  T(F): 97.8 (15 Aug 2024 04:02), Max: 97.8 (14 Aug 2024 20:59)  HR: 91 (15 Aug 2024 04:02) (77 - 114)  BP: 122/70 (15 Aug 2024 04:02) (95/60 - 125/76)  BP(mean): --  RR: 20 (15 Aug 2024 04:02) (18 - 20)  SpO2: 96% (15 Aug 2024 04:02) (95% - 98%)    Parameters below as of 15 Aug 2024 04:02  Patient On (Oxygen Delivery Method): nasal cannula  O2 Flow (L/min): 2      PHYSICAL EXAM:  GENERAL: NAD, ill-appearing  HEAD: Atraumatic, Normocephalic  EYES: EOMI, conjunctiva and sclera clear  NECK: Supple  CHEST/LUNG: CTAB, noted with productive cough with thick, opaque, light brown sputum  HEART: Normal S1/S2; Irregular rhythm; No murmurs, rubs, or gallops  ABDOMEN: Soft, Nontender, edematous  EXTREMITIES: Profound pitting edema in lower extremities until hips, legs covered by ACE bandages today, edematous in proximal extremities; pitting is deep but rebounds to quarter of depth within seconds; overall slightly improved today  PSYCH: A&Ox1-2  NEUROLOGY: no focal neurologic deficit    LABS:                        9.0    7.00  )-----------( 101      ( 14 Aug 2024 10:51 )             28.9      08-14    145  |  107  |  28<H>  ----------------------------<  126<H>  4.1   |  22  |  1.16    Ca    8.1<L>      14 Aug 2024 10:51  Phos  3.6     08-14  Mg     2.0     08-14    TPro  5.2<L>  /  Alb  3.1<L>  /  TBili  0.8  /  DBili  x   /  AST  24  /  ALT  16  /  AlkPhos  189<H>  08-14    PT/INR - ( 13 Aug 2024 10:28 )   PT: 12.3 sec;   INR: 1.12 ratio         PTT - ( 13 Aug 2024 10:28 )  PTT:28.0 sec      Urinalysis Basic - ( 14 Aug 2024 10:51 )    Color: x / Appearance: x / SG: x / pH: x  Gluc: 126 mg/dL / Ketone: x  / Bili: x / Urobili: x   Blood: x / Protein: x / Nitrite: x   Leuk Esterase: x / RBC: x / WBC x   Sq Epi: x / Non Sq Epi: x / Bacteria: x        RADIOLOGY & ADDITIONAL TESTS:    Imaging Personally Reviewed:    Consultant(s) Notes Reviewed:      Care Discussed with Consultants/Other Providers:   Patient is a 79y old  Male who presents with a chief complaint of hypotension (14 Aug 2024 13:20)      SUBJECTIVE / OVERNIGHT EVENTS:   HR up to 130s on tele overnight. Otherwise, patient with one episode of dark brown stool but not melena.    Today, patient examined at bedside. Initially appeared uncomfortable but readjusted his positioning. He has no other complaints. He denies any new pain but endorses some difficulties with shortness of breath.    MEDICATIONS  (STANDING):  AQUAPHOR (petrolatum Ointment) 1 Application(s) Topical two times a day  chlorhexidine 2% Cloths 1 Application(s) Topical <User Schedule>  dextrose 50% Injectable 25 Gram(s) IV Push once  digoxin     Tablet 125 MICROGram(s) Oral every other day  folic acid Injectable 1 milliGRAM(s) IV Push daily  guaiFENesin Oral Liquid (Sugar-Free) 200 milliGRAM(s) Oral every 6 hours  insulin lispro (ADMELOG) corrective regimen sliding scale   SubCutaneous at bedtime  insulin lispro (ADMELOG) corrective regimen sliding scale   SubCutaneous three times a day before meals  metoprolol tartrate 25 milliGRAM(s) Oral two times a day  midodrine 10 milliGRAM(s) Oral every 8 hours  octreotide  Injectable 250 MICROGram(s) IV Push three times a day  pantoprazole  Injectable 40 milliGRAM(s) IV Push every 12 hours  sodium chloride 3%  Inhalation 4 milliLiter(s) Inhalation every 12 hours  thiamine Injectable 100 milliGRAM(s) IV Push daily    MEDICATIONS  (PRN):  ondansetron Injectable 4 milliGRAM(s) IV Push once PRN Nausea and/or Vomiting      CAPILLARY BLOOD GLUCOSE      POCT Blood Glucose.: 136 mg/dL (14 Aug 2024 22:15)  POCT Blood Glucose.: 119 mg/dL (14 Aug 2024 16:22)  POCT Blood Glucose.: 136 mg/dL (14 Aug 2024 12:15)  POCT Blood Glucose.: 134 mg/dL (14 Aug 2024 08:01)    I&O's Summary    14 Aug 2024 07:01  -  15 Aug 2024 07:00  --------------------------------------------------------  IN: 130 mL / OUT: 931 mL / NET: -801 mL        Vital Signs Last 24 Hrs  T(C): 36.6 (15 Aug 2024 04:02), Max: 36.6 (14 Aug 2024 20:59)  T(F): 97.8 (15 Aug 2024 04:02), Max: 97.8 (14 Aug 2024 20:59)  HR: 91 (15 Aug 2024 04:02) (77 - 114)  BP: 122/70 (15 Aug 2024 04:02) (95/60 - 125/76)  BP(mean): --  RR: 20 (15 Aug 2024 04:02) (18 - 20)  SpO2: 96% (15 Aug 2024 04:02) (95% - 98%)    Parameters below as of 15 Aug 2024 04:02  Patient On (Oxygen Delivery Method): nasal cannula  O2 Flow (L/min): 2      PHYSICAL EXAM:  GENERAL: NAD, ill-appearing, nasal cannula  HEAD: Atraumatic, Normocephalic  EYES: EOMI, conjunctiva and sclera clear  NECK: Supple  CHEST/LUNG: Upper airway sounds transmitted throughout  HEART: Normal S1/S2; Irregular rhythm; No murmurs, rubs, or gallops  ABDOMEN: Soft, Nontender, edematous  EXTREMITIES: Profound pitting edema in lower extremities until hips, legs covered by ACE bandages today, edematous in proximal extremities; greatly improved today  PSYCH: A&Ox1-2  NEUROLOGY: no focal neurologic deficit    LABS:                        9.0    7.00  )-----------( 101      ( 14 Aug 2024 10:51 )             28.9      08-14    145  |  107  |  28<H>  ----------------------------<  126<H>  4.1   |  22  |  1.16    Ca    8.1<L>      14 Aug 2024 10:51  Phos  3.6     08-14  Mg     2.0     08-14    TPro  5.2<L>  /  Alb  3.1<L>  /  TBili  0.8  /  DBili  x   /  AST  24  /  ALT  16  /  AlkPhos  189<H>  08-14    PT/INR - ( 13 Aug 2024 10:28 )   PT: 12.3 sec;   INR: 1.12 ratio         PTT - ( 13 Aug 2024 10:28 )  PTT:28.0 sec      Urinalysis Basic - ( 14 Aug 2024 10:51 )    Color: x / Appearance: x / SG: x / pH: x  Gluc: 126 mg/dL / Ketone: x  / Bili: x / Urobili: x   Blood: x / Protein: x / Nitrite: x   Leuk Esterase: x / RBC: x / WBC x   Sq Epi: x / Non Sq Epi: x / Bacteria: x        RADIOLOGY & ADDITIONAL TESTS:    Imaging Personally Reviewed:    Consultant(s) Notes Reviewed:      Care Discussed with Consultants/Other Providers:

## 2024-08-16 NOTE — PROGRESS NOTE ADULT - SUBJECTIVE AND OBJECTIVE BOX
SUBJECTIVE AND OBJECTIVE: pt seen and examined at bedside. pt lethargic on exam   Indication for Geriatrics and Palliative Care Services/INTERVAL HPI: goc     OVERNIGHT EVENTS: no acute events o/n     DNR on chart:  Allergies    No Known Allergies    Intolerances    MEDICATIONS  (STANDING):  AQUAPHOR (petrolatum Ointment) 1 Application(s) Topical two times a day  buMETAnide Injectable 2 milliGRAM(s) IV Push daily  chlorhexidine 2% Cloths 1 Application(s) Topical <User Schedule>  dextrose 50% Injectable 25 Gram(s) IV Push once  digoxin     Tablet 125 MICROGram(s) Oral every other day  folic acid Injectable 1 milliGRAM(s) IV Push daily  guaiFENesin Oral Liquid (Sugar-Free) 300 milliGRAM(s) Oral every 6 hours  insulin lispro (ADMELOG) corrective regimen sliding scale   SubCutaneous at bedtime  insulin lispro (ADMELOG) corrective regimen sliding scale   SubCutaneous three times a day before meals  magnesium sulfate  IVPB 2 Gram(s) IV Intermittent once  metoprolol tartrate 25 milliGRAM(s) Oral two times a day  midodrine 10 milliGRAM(s) Oral every 8 hours  octreotide  Injectable 250 MICROGram(s) IV Push three times a day  pantoprazole  Injectable 40 milliGRAM(s) IV Push every 12 hours  potassium chloride   Powder 40 milliEquivalent(s) Oral once  potassium phosphate IVPB 30 milliMole(s) IV Intermittent once  sodium chloride 3%  Inhalation 4 milliLiter(s) Inhalation every 12 hours  thiamine Injectable 100 milliGRAM(s) IV Push daily    MEDICATIONS  (PRN):  ondansetron Injectable 4 milliGRAM(s) IV Push once PRN Nausea and/or Vomiting      ITEMS UNCHECKED ARE NOT PRESENT    PRESENT SYMPTOMS: [ ]Unable to self-report - see [ ] CPOT [ ] PAINADS [ ] RDOS  Source if other than patient:  [ ]Family   [ ]Team     Pain:  [ ]yes [ x]no  QOL impact -   Location -                    Aggravating factors -  Quality -  Radiation -  Timing-  Severity (0-10 scale):  Minimal acceptable level (0-10 scale):     CPOT:    https://www.scc[facility].org/getattachment/[email or web address]/Critical-Care-Pain-Observation-Tool-(CPOT)    PAINAD Score: See PAINAD tool and score below       Dyspnea:                           [ ]Mild [ ]Moderate [ ]Severe    RDOS: See RDOS tool and score below   0 to 2  minimal or no respiratory distress   3  mild distress  4 to 6 moderate distress  >7 severe distress      Anxiety:                             [ ]Mild [ ]Moderate [ ]Severe  Fatigue:                             [ ]Mild [ ]Moderate [ ]Severe  Nausea:                             [ ]Mild [ ]Moderate [ ]Severe  Loss of appetite:              [ ]Mild [ ]Moderate [ ]Severe  Constipation:                    [ ]Mild [ ]Moderate [ ]Severe    PCSSQ[Palliative Care Spiritual Screening Question]   Severity (0-10):  Score of 4 or > indicate consideration of Chaplaincy referral.  Chaplaincy Referral: [ ] yes [ ] refused [ ] following [ ] Deferred     Caregiver [place]? : [ ] yes [ ] no [ ] Deferred [ ] Declined             Social work referral [ ] Patient & Family Centered Care Referral [ ]     Anticipatory Grief present?:  [ ] yes [ ] no  [ ] Deferred                  Social work referral [ ] Chaplaincy Referral [ ]    		  Other Symptoms:  [x ]All other review of systems negative- limited ability to participate     Palliative Performance Status Version 2:   See PPSv2 tool and score below         PHYSICAL EXAM:  Vital Signs Last 24 Hrs  T(C): 36.9 (16 Aug 2024 12:53), Max: 36.9 (16 Aug 2024 12:53)  T(F): 98.4 (16 Aug 2024 12:53), Max: 98.4 (16 Aug 2024 12:53)  HR: 93 (16 Aug 2024 12:53) (64 - 107)  BP: 106/64 (16 Aug 2024 12:53) (103/65 - 125/66)  BP(mean): --  RR: 18 (16 Aug 2024 12:53) (18 - 18)  SpO2: 94% (16 Aug 2024 12:53) (94% - 98%)    Parameters below as of 16 Aug 2024 12:53  Patient On (Oxygen Delivery Method): nasal cannula     I&O's Summary    15 Aug 2024 07:01  -  16 Aug 2024 07:00  --------------------------------------------------------  IN: 100 mL / OUT: 1251 mL / NET: -1151 mL    16 Aug 2024 07:01  -  16 Aug 2024 12:56  --------------------------------------------------------  IN: 0 mL / OUT: 1300 mL / NET: -1300 mL       GENERAL: [ ]Cachexia    [ ]Alert  [ ]Oriented x   [x ]Lethargic  [ ]Unarousable  [ ]Verbal  [ ]Non-Verbal  Behavioral:   [ ]Anxiety  [ ]Delirium [ ]Agitation [ ]Other  HEENT:  [ ]Normal   [x ]Dry mouth   [ ]ET Tube/Trach  [ ]Oral lesions  PULMONARY:   [ ]Clear [ ]Tachypnea  [ ]Audible excessive secretions   [ ]Rhonchi        [ ]Right [ ]Left [ ]Bilateral  [ ]Crackles        [ ]Right [ ]Left [ ]Bilateral  [ ]Wheezing     [ ]Right [ ]Left [ ]Bilateral  [x ]Diminished BS [ ] Right [ ]Left [ x]Bilateral  CARDIOVASCULAR:    [x ]Regular [ ]Irregular [ ]Tachy  [ ]Lavelle [ ]Murmur [ ]Other  GASTROINTESTINAL:  [ x]Soft  [ ]Distended   [ x]+BS  [x ]Non tender [ ]Tender  [ ]Other [ ]PEG [ ]OGT/ NGT   Last BM:   GENITOURINARY:  [ ]Normal [x ]Incontinent   [ ]Oliguria/Anuria   [ ]Ivory  MUSCULOSKELETAL:   [ ]Normal   [ x]Weakness  [ x]Bed/Wheelchair bound [ ]Edema  NEUROLOGIC:   [ ]No focal deficits  [x ] Cognitive impairment  [ ] Dysphagia [ ]Dysarthria [ ] Paresis [ ]Other   SKIN:   [ ]Normal  [ ]Rash  [ ]Other  [ ]Pressure ulcer(s) [ ]y [ ]n present on admission    CRITICAL CARE:  [ ]Shock Present  [ ]Septic [ ]Cardiogenic [ ]Neurologic [ ]Hypovolemic  [ ]Vasopressors [ ]Inotropes  [ ]Respiratory failure present [ ]Mechanical Ventilation [ ]Non-invasive ventilatory support [ ]High-Flow   [ ]Acute  [ ]Chronic [ ]Hypoxic  [ ]Hypercarbic [ ]Other  [ ]Other organ failure     LABS:                        9.2    5.26  )-----------( 116      ( 16 Aug 2024 05:47 )             29.5   08-16    144  |  106  |  27<H>  ----------------------------<  134<H>  3.2<L>   |  25  |  1.07    Ca    8.4      16 Aug 2024 05:47  Phos  2.9     08-16  Mg     1.6     08-16    TPro  5.3<L>  /  Alb  3.1<L>  /  TBili  0.8  /  DBili  0.3  /  AST  30  /  ALT  15  /  AlkPhos  191<H>  08-15      Urinalysis Basic - ( 16 Aug 2024 05:47 )    Color: x / Appearance: x / SG: x / pH: x  Gluc: 134 mg/dL / Ketone: x  / Bili: x / Urobili: x   Blood: x / Protein: x / Nitrite: x   Leuk Esterase: x / RBC: x / WBC x   Sq Epi: x / Non Sq Epi: x / Bacteria: x      RADIOLOGY & ADDITIONAL STUDIES:  < from: CT Angio Abdomen and Pelvis w/ IV Cont (08.08.24 @ 14:03) >    ACC: [id number] EXAM:  CT ANGIO ABD PELV (W)AW IC   ORDERED BY:  HEATHER CALLAHAN     PROCEDURE DATE:  08/08/2024          INTERPRETATION:  CLINICAL INFORMATION: New melena. Metastatic pancreatic   neuroendocrine tumor.    COMPARISON: CT abdomen and pelvis8/4/2024.    CONTRAST/COMPLICATIONS:  IV Contrast: Omnipaque 350  90 cc administered   10 cc discarded  Oral Contrast: NONE  Complications: None reported at time of study completion    PROCEDURE:  CT of the Abdomen and Pelvis was performed.  Precontrast, Arterial and Delayed phases were performed.  Sagittal and coronal reformats were performed.    FINDINGS:  LOWER CHEST: Moderate bilateral pleural effusions. Complete atelectasis   of the imaged left lower lobe. Partial atelectasis in the left upper and   right lower lobes. Coronary artery and aortic valve calcifications.   Cardiomegaly. Partially imaged soft tissue gas tracking between the left   pectoralis major and minor muscles.    LIVER: Nodular liver contour. Again seen are numerous ill-defined   hypoattenuating lesions throughout the liver, the largest in the right   lobe measuring approximately 7.0 cm.  BILE DUCTS: Normal caliber.  GALLBLADDER: Layering hyperdense material, which may reflect vicarious   excretion of contrast, sludge, or stones.  SPLEEN: Within normal limits.  PANCREAS: Within normal limits.  ADRENALS: Within normal limits.  KIDNEYS/URETERS: No hydronephrosis. Nonobstructing left renal calculus   measuring 5 mm. Bilateral renal cortical thinning. Bilateral hypodense   renal lesions.    BLADDER: Hyperdense fluid within the bladder could reflect renally   excreted contrast. Small amount of gas within the bladder, slightly   decreased since the prior CT. Several bladder stones with example   measuring 3.5 x 2.3cm.  REPRODUCTIVE ORGANS: The prostate is normal in size and contains coarse   calcification.    BOWEL: Residual high density oral contrast within the colon from recent   fluoroscopic barium swallow study, which markedly limits evaluation of   the colon for active hemorrhage, and also causes streak artifact that   obscures adjacent structures. There is also streak artifact from spinal   hardware, embolization material in the GDA distribution, and clips within   the duodenum, which also limits assessment for active gastrointestinal   hemorrhage. Question a focus of arterial enhancement immediately adjacent   to the duodenal clips (series 6 image 100), without definite pooling   appreciated on the delayed phase. No bowel obstruction.  PERITONEUM/RETROPERITONEUM: Small amount of peritoneal fluid and edema of   the peritoneum.  VESSELS: Atherosclerotic changes. Embolization material in the GDA   distribution.  LYMPH NODES: No lymphadenopathy.  ABDOMINAL WALL: Diffuse subcutaneous edema. Tiny fat-containing umbilical   hernia.  BONES: Right hip arthroplasty with associated streak artifact obscuring   portions of the pelvis. There is also extensive spinal hardware with   streak artifact obscuring adjacent structures. Posterior fusion spanning   T10-S1 with bilateral sacroiliac extension. Interbody fusion at L5-S1.   Multilevel lumbar laminectomies.    IMPRESSION:  *  Limited evaluation for active gastrointestinal hemorrhage due to high   density oral contrast within the colon and streakartifact from   orthopedic hardware, embolization material, and duodenal clips. Question   a focus of enhancement in the duodenum adjacent to the duodenal clips   versus streak artifact, with active hemorrhage within the duodenum not   excluded.  *  Moderate bilateral pleural effusions. Marked bibasilar atelectasis.  *  Partially imaged soft tissue gas tracking between the left pectoralis   major and minor muscles of uncertain etiology.  *  Additional findings as described in the report.    Findings were discussed with Dr. Jodie Okeefe 8/8/2024 3:12 PM by Dr. Almonte with read back confirmation.    --- End of Report ---            DEBBY ALMONTE MD; Attending Radiologist  This document has been electronically signed. Aug  8 2024  3:16PM    < end of copied text >    Protein Calorie Malnutrition Present: [ ]mild [ ]moderate [ ]severe [ ]underweight [ ]morbid obesity  https://www.andeal.org/vault/2440/web/files/ONC/Table_Clinical%20Characteristics%20to%20Document%20Malnutrition-White%20JV%20et%20al%202012.pdf    Height (cm): 188 (07-09-24 @ 13:27), 188 (06-24-24 @ 13:43), 188 (06-12-24 @ 11:58)  Weight (kg): 125.6 (07-21-24 @ 00:00), 102.5 (06-24-24 @ 13:43), 102.1 (06-12-24 @ 11:58)  BMI (kg/m2): 35.5 (07-21-24 @ 00:00), 29 (07-09-24 @ 13:27), 29 (06-24-24 @ 13:43)    [x ]PPSV2 < or = 30%  [ ]significant weight loss [ ]poor nutritional intake [ ]anasarca[ ]Artificial Nutrition    Other REFERRALS:  [ ]Hospice  [ ]Child Life  [ ]Social Work  [ ]Case management [ ]Holistic Therapy     Goals of Care Document:

## 2024-08-16 NOTE — PROGRESS NOTE ADULT - PROBLEM SELECTOR PROBLEM 7
No. LUCILA screening performed.  STOP BANG Legend: 0-2 = LOW Risk; 3-4 = INTERMEDIATE Risk; 5-8 = HIGH Risk NSVT (nonsustained ventricular tachycardia)

## 2024-08-16 NOTE — CHART NOTE - NSCHARTNOTEFT_GEN_A_CORE
Spoke with Yamilet (patient's wife) at bedside. Discussed the nature of next week's meeting with her in detail (ex: stent treatment, further cancer treatment, current stability that may allow for improved quality of life at home), provided medical update, and answered all questions. Clarified that meeting is next Tuesday at 3pm (not Thursday as written in progress note).

## 2024-08-16 NOTE — CHART NOTE - NSCHARTNOTEFT_GEN_A_CORE
NUTRITION FOLLOW UP NOTE    PATIENT SEEN FOR: RD Consult for Nutrition Assessment    SOURCE: [X] Patient  [x] Current Medical Record  [X] RN  [] Family/support person at bedside  [] Patient unavailable/inappropriate  [] Other:    CHART REVIEWED/EVENTS NOTED.  [] No changes to nutrition care plan to note  [X] Nutrition Status:  - Mets neuroendocrine pancreatic cancer.   - FTT in setting of metastatic disease.   - S/p MBS (), per speech language pathologist "pt remains at increased risk of aspiration with oral feeding. 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     DIET ORDER:   Diet, Pureed:   Mildly Thick Liquids (MILDTHICKLIQS)  Supplement Feeding Modality:  Oral  Ensure Enlive Cans or Servings Per Day:  1       Frequency:  Three Times a day (08-15-)      CURRENT DIET ORDER IS:  [] Appropriate:  [X] Inadequate: See recommendations below  [] Other:    NUTRITION INTAKE/PROVISION:  [X] PO:  - Per team, pt with ongoing poor appetite/PO intake; pt with very limited food preferences. Only having small bites of meals (i.e, cream of wheat, pudding, soup, magic cup). Attempted to obtain pt's food preferences, however, pt upset he was woken up. RD will continue to re-assess as able.   - Noted   - Pt not drinking Ensure Plus High Protein (Provides 20g PRO, 350 Sebastian per serving). However, per RD initial note, pt was previous drinking ~1x Ensure PTA. Also noted, pt previously disliked Prosource gelatein.   - Consider trialing the following oral nutrition supplementation: Ensure Plus High Protein (Provides 20g PRO, 350 Sebastian per serving) 1x, Liquid Protein Supplementation (per serving provides 15 g PRO, 100 kcal) 1x, Magic Cup (per serving provides 9 g PRO, 290 kcal) 2x, to optimize protein/caloric intake. Trend POCTs.   [] Enteral Nutrition:  [] Parenteral Nutrition:    ANTHROPOMETRICS:  Drug Dosing Weight  Height (cm): 188 (2024 13:27)  Weight (kg): 125.6 (2024 00:00)  BMI (kg/m2): 35.5 (2024 00:00)    Wt hx:  Daily Weight in k.7 (), 125.8 (), 121.6 ( ), 124.6 ( ), 129.4 (), 140.3 (), 125.6 (), 120.1 (). Noted hx of wt fluctuations likely in setting of fluid shifts, edema. RD will continue to monitor wt trends as wts become available/able.     NUTRITIONALLY PERTINENT MEDICATIONS:  MEDICATIONS  (STANDING):  buMETAnide Injectable  dextrose 50% Injectable  digoxin     Tablet  folic acid Injectable  insulin lispro (ADMELOG) corrective regimen sliding scale  insulin lispro (ADMELOG) corrective regimen sliding scale  metoprolol tartrate  midodrine  octreotide  Injectable  pantoprazole  Injectable  potassium phosphate IVPB  thiamine Injectable       NUTRITIONALLY PERTINENT LABS:   Na144 mmol/L Glu 134 mg/dL<H> K+ 3.2 mmol/L<L> Cr  1.07 mg/dL BUN 27 mg/dL<H>  Phos 2.9 mg/dL 08-15 Alb 3.1 g/dL<L>08-15 ALT 15 U/L AST 30 U/L Alkaline Phosphatase 191 U/L<H>    A1C with Estimated Average Glucose Result: 6.4 % (07-15-24 @ 09:38)    Finger Sticks:  POCT Blood Glucose.: 145 mg/dL ( @ 12:44)  POCT Blood Glucose.: 177 mg/dL ( @ 07:52)  POCT Blood Glucose.: 134 mg/dL (08-15 @ 21:45)  POCT Blood Glucose.: 178 mg/dL (08-15 @ 16:33)      NUTRITIONALLY PERTINENT MEDICATIONS/LABS:  [x] Reviewed  [X] Relevant notes on medications/labs:  - POCTs x 24 hrs: WNL. Ordered for SSI.   - Ordered for Bumex, Octreotride.   - Noted hypokalemia () s/p KCl and KPhos repletion. Ordered for magnesium sulfate. **Pt at risk for refeeding syndrome; continue to trend K+, Mg, Phos and replete as needed.  - Ordered for folic acid, thiamine supplementation.     EDEMA:  [x] Reviewed  [X] Relevant notes: +4 generalized edema noted per nursing documentation.    GI/ I&O:  [x] Reviewed  [X] Relevant notes: No N/V reported; ordered for odansetron. No diarrhea/constipation reported; last BM on .   [X] Other: Ordered for PPI.    SKIN:   [] No pressure injuries documented, per nursing flowsheet  [] Pressure injury previously noted  [X] Change in pressure injury documentation: Per wound care (24), pt with sacrum, b/l buttocks DTI; however on wound care note (), not noted, but L upper back wound noted.   [] Other:    ESTIMATED NEEDS:  [X] No change:  [] Updated:  Energy:  2270 - 2764 kcal/day (23 - 28 kcal/kg)  Protein:  118 - 138 g/day (1.2 - 1.4 g/kg)  Fluid:   ml/day or [X] defer to team  Based on: previous dosing wt of 98.7 kg (7/3) in consideration of advanced age, pressure injuries.     NUTRITION DIAGNOSIS:  [X] Prior Dx: (1) Severe Chronic Malnutrition (2) Increased Nutrient Needs  [] New Dx:    EDUCATION:  [] Yes:  [X] Not appropriate/warranted; Unable to at this time as pt deferring assessment, requesting to sleep. RD will continue to provide edu as able/appropriate.     NUTRITION CARE PLAN:  1. Defer diet to medical team; consider diet free of therapeutic diet restrictions to optimize PO intake; defer diet texture/consistency to speech language pathologist prn.   2. Trial oral nutrition supplementation: Ensure Plus High Protein (Provides 20g PRO, 350 Sebastian per serving) 1x, Liquid Protein Supplementation (per serving provides 15 g PRO, 100 kcal) 1x, Magic Cup (per serving provides 9 g PRO, 290 kcal) 2x, Diet Mighty Shakes (provides 7 g PRO, 200 kcal per serving) 2x to optimize PO intake.            [X] Trend POCTs; consider adjusting oral nutrition supplementation as needed.            [x] Trend K+, Mg, Phos and replete as needed.   3. Continue with folic acid, thiamine supplementation for refeeding/micronutrient coverage, pending no medical contraindications.   4: Continue to address pt's/family's nutrition GOC regarding long term nutrition support as medically appropriate/able.       MONITORING AND EVALUATION:   RD remains available upon request and will follow up per protocol.    Aida Rogel RD  Available on [facility] TEAMS NUTRITION FOLLOW UP NOTE    PATIENT SEEN FOR: RD Consult for Nutrition Assessment    SOURCE: [X] Patient  [x] Current Medical Record  [X] RN  [] Family/support person at bedside  [] Patient unavailable/inappropriate  [] Other:    CHART REVIEWED/EVENTS NOTED.  [] No changes to nutrition care plan to note  [X] Nutrition Status:  - Mets neuroendocrine pancreatic cancer.   - FTT in setting of metastatic disease.   - S/p MBS (), per speech language pathologist "pt remains at increased risk of aspiration with oral feeding. 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     DIET ORDER:   Diet, Pureed:   Mildly Thick Liquids (MILDTHICKLIQS)  Supplement Feeding Modality:  Oral  Ensure Enlive Cans or Servings Per Day:  1       Frequency:  Three Times a day (08-15-)      CURRENT DIET ORDER IS:  [] Appropriate:  [X] Inadequate: See recommendations below  [] Other:    NUTRITION INTAKE/PROVISION:  [X] PO:  - Per team, pt with ongoing poor appetite/PO intake; pt with very limited food preferences. Only having small bites of meals (i.e, cream of wheat, pudding, soup, magic cup). Attempted to obtain pt's food preferences, however, pt upset he was woken up. RD will continue to re-assess as able.   - Noted   - Pt not drinking Ensure Plus High Protein (Provides 20g PRO, 350 Sebastian per serving). However, per RD initial note, pt was previous drinking ~1x Ensure PTA. Also noted, pt previously disliked Prosource gelatein.   - Consider trialing the following oral nutrition supplementation: Ensure Plus High Protein (Provides 20g PRO, 350 Sebastian per serving) 1x, Liquid Protein Supplementation (per serving provides 15 g PRO, 100 kcal) 1x, Magic Cup (per serving provides 9 g PRO, 290 kcal) 2x, to optimize protein/caloric intake. Trend POCTs.   [] Enteral Nutrition:  [] Parenteral Nutrition:    ANTHROPOMETRICS:  Drug Dosing Weight  Height (cm): 188 (2024 13:27)  Weight (kg): 125.6 (2024 00:00)  BMI (kg/m2): 35.5 (2024 00:00)    Wt hx:  Daily Weight in k.7 (), 125.8 (), 121.6 ( ), 124.6 ( ), 129.4 (), 140.3 (), 125.6 (), 120.1 (). Noted hx of wt fluctuations likely in setting of fluid shifts, edema. RD will continue to monitor wt trends as wts become available/able.     NUTRITIONALLY PERTINENT MEDICATIONS:  MEDICATIONS  (STANDING):  buMETAnide Injectable  dextrose 50% Injectable  digoxin     Tablet  folic acid Injectable  insulin lispro (ADMELOG) corrective regimen sliding scale  insulin lispro (ADMELOG) corrective regimen sliding scale  metoprolol tartrate  midodrine  octreotide  Injectable  pantoprazole  Injectable  potassium phosphate IVPB  thiamine Injectable       NUTRITIONALLY PERTINENT LABS:   Na144 mmol/L Glu 134 mg/dL<H> K+ 3.2 mmol/L<L> Cr  1.07 mg/dL BUN 27 mg/dL<H>  Phos 2.9 mg/dL 08-15 Alb 3.1 g/dL<L>08-15 ALT 15 U/L AST 30 U/L Alkaline Phosphatase 191 U/L<H>    A1C with Estimated Average Glucose Result: 6.4 % (07-15-24 @ 09:38)    Finger Sticks:  POCT Blood Glucose.: 145 mg/dL ( @ 12:44)  POCT Blood Glucose.: 177 mg/dL ( @ 07:52)  POCT Blood Glucose.: 134 mg/dL (08-15 @ 21:45)  POCT Blood Glucose.: 178 mg/dL (08-15 @ 16:33)      NUTRITIONALLY PERTINENT MEDICATIONS/LABS:  [x] Reviewed  [X] Relevant notes on medications/labs:  - POCTs x 24 hrs: WNL. Ordered for SSI.   - Ordered for Bumex, Octreotride.   - Noted hypokalemia () s/p KCl and KPhos repletion. Ordered for magnesium sulfate. **Pt at risk for refeeding syndrome; continue to trend K+, Mg, Phos and replete as needed.  - Ordered for folic acid, thiamine supplementation.     EDEMA:  [x] Reviewed  [X] Relevant notes: +4 generalized edema noted per nursing documentation.    GI/ I&O:  [x] Reviewed  [X] Relevant notes: No N/V reported; ordered for odansetron. No diarrhea/constipation reported; last BM on .   [X] Other: Ordered for PPI.    SKIN:   [] No pressure injuries documented, per nursing flowsheet  [] Pressure injury previously noted  [X] Change in pressure injury documentation: Per wound care (24), pt with sacrum, b/l buttocks DTI; however on wound care note (), not noted, but L upper back wound noted.   [] Other:    ESTIMATED NEEDS:  [X] No change:  [] Updated:  Energy:  2270 - 2764 kcal/day (23 - 28 kcal/kg)  Protein:  118 - 138 g/day (1.2 - 1.4 g/kg)  Fluid:   ml/day or [X] defer to team  Based on: previous dosing wt of 98.7 kg (7/3) in consideration of advanced age, pressure injuries.     NUTRITION DIAGNOSIS:  [X] Prior Dx: (1) Severe Chronic Malnutrition (2) Increased Nutrient Needs  [] New Dx:    EDUCATION:  [] Yes:  [X] Not appropriate/warranted; Unable to at this time as pt deferring assessment, requesting to sleep. RD will continue to provide edu as able/appropriate.     NUTRITION CARE PLAN:  1. Defer diet to medical team; consider diet free of therapeutic diet restrictions to optimize PO intake; defer diet texture/consistency to speech language pathologist prn.   2. Trial oral nutrition supplementation: Ensure Plus High Protein (Provides 20g PRO, 350 Sebastian per serving) 1x, Liquid Protein Supplementation (per serving provides 15 g PRO, 100 kcal) 1x, Magic Cup (per serving provides 9 g PRO, 290 kcal) 2x to optimize PO intake.            [X] Trend POCTs; consider adjusting oral nutrition supplementation as needed.            [x] Trend K+, Mg, Phos and replete as needed.   3. Continue with folic acid, thiamine supplementation for refeeding/micronutrient coverage, pending no medical contraindications.   4: Continue to address pt's/family's nutrition GOC regarding long term nutrition support as medically appropriate/able.            [x] Pt would likely benefit from appetite stimulant and/or supplementatal enteral nutrition if within further GOC.       MONITORING AND EVALUATION:   RD remains available upon request and will follow up per protocol.    Aida Rogel RD  Available on [facility] TEAMS NUTRITION FOLLOW UP NOTE    PATIENT SEEN FOR: RD Consult for Nutrition Assessment    SOURCE: [X] Patient  [x] Current Medical Record  [X] RN  [] Family/support person at bedside  [] Patient unavailable/inappropriate  [] Other:    CHART REVIEWED/EVENTS NOTED.  [] No changes to nutrition care plan to note  [X] Nutrition Status:  - Mets neuroendocrine pancreatic cancer.   - FTT in setting of metastatic disease.   - S/p MBS (), per speech language pathologist "pt remains at increased risk of aspiration with oral feeding. 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     DIET ORDER:   Diet, Pureed:   Mildly Thick Liquids (MILDTHICKLIQS)  Supplement Feeding Modality:  Oral  Ensure Enlive Cans or Servings Per Day:  1       Frequency:  Three Times a day (08-15-24)      CURRENT DIET ORDER IS:  [] Appropriate:  [X] Inadequate: See recommendations below  [] Other:    NUTRITION INTAKE/PROVISION:  [X] PO:  - Per team, pt with ongoing poor appetite/PO intake; pt with very limited food preferences. Only having small bites of meals (i.e, cream of wheat, pudding, soup, magic cup). Attempted to obtain pt's food preferences, however, pt upset he was woken up. RD will continue to re-assess as able.   - Noted   - Pt not drinking Ensure Plus High Protein (Provides 20g PRO, 350 Sebastian per serving). However, per RD initial note, pt was previous drinking ~1x Ensure PTA. Also noted, pt previously disliked Prosource gelatein.   - Consider trialing the following oral nutrition supplementation: Ensure Plus High Protein (Provides 20g PRO, 350 Sebastian per serving) 1x, Ensure Clear Protein Shake ((provide 180 kcal, 8 g PRO per serving) 1x/d, Liquid Protein Supplementation (per serving provides 15 g PRO, 100 kcal) 1x, Magic Cup (per serving provides 9 g PRO, 290 kcal) 2x, to optimize protein/caloric intake. Trend POCTs.   [] Enteral Nutrition:  [] Parenteral Nutrition:    ANTHROPOMETRICS:  Drug Dosing Weight  Height (cm): 188 (2024 13:27)  Weight (kg): 125.6 (2024 00:00)  BMI (kg/m2): 35.5 (2024 00:00)    Wt hx:  Daily Weight in k.7 (-), 125.8 (), 121.6 ( ), 124.6 ( ), 129.4 (), 140.3 (), 125.6 (), 120.1 (). Noted hx of wt fluctuations likely in setting of fluid shifts, edema. RD will continue to monitor wt trends as wts become available/able.     NUTRITIONALLY PERTINENT MEDICATIONS:  MEDICATIONS  (STANDING):  buMETAnide Injectable  dextrose 50% Injectable  digoxin     Tablet  folic acid Injectable  insulin lispro (ADMELOG) corrective regimen sliding scale  insulin lispro (ADMELOG) corrective regimen sliding scale  metoprolol tartrate  midodrine  octreotide  Injectable  pantoprazole  Injectable  potassium phosphate IVPB  thiamine Injectable       NUTRITIONALLY PERTINENT LABS:   Na144 mmol/L Glu 134 mg/dL<H> K+ 3.2 mmol/L<L> Cr  1.07 mg/dL BUN 27 mg/dL<H>  Phos 2.9 mg/dL 08-15 Alb 3.1 g/dL<L>08-15 ALT 15 U/L AST 30 U/L Alkaline Phosphatase 191 U/L<H>    A1C with Estimated Average Glucose Result: 6.4 % (07-15-24 @ 09:38)    Finger Sticks:  POCT Blood Glucose.: 145 mg/dL ( @ 12:44)  POCT Blood Glucose.: 177 mg/dL ( @ 07:52)  POCT Blood Glucose.: 134 mg/dL (08-15 @ 21:45)  POCT Blood Glucose.: 178 mg/dL (08-15 @ 16:33)      NUTRITIONALLY PERTINENT MEDICATIONS/LABS:  [x] Reviewed  [X] Relevant notes on medications/labs:  - POCTs x 24 hrs: WNL. Ordered for SSI.   - Ordered for Bumex, Octreotride.   - Noted hypokalemia () s/p KCl and KPhos repletion. Ordered for magnesium sulfate. **Pt at risk for refeeding syndrome; continue to trend K+, Mg, Phos and replete as needed.  - Ordered for folic acid, thiamine supplementation.     EDEMA:  [x] Reviewed  [X] Relevant notes: +4 generalized edema noted per nursing documentation.    GI/ I&O:  [x] Reviewed  [X] Relevant notes: No N/V reported; ordered for odansetron. No diarrhea/constipation reported; last BM on .   [X] Other: Ordered for PPI.    SKIN:   [] No pressure injuries documented, per nursing flowsheet  [] Pressure injury previously noted  [X] Change in pressure injury documentation: Per wound care (24), pt with sacrum, b/l buttocks DTI; however on wound care note (), not noted, but L upper back wound noted.   [] Other:    ESTIMATED NEEDS:  [X] No change:  [] Updated:  Energy:  2270 - 2764 kcal/day (23 - 28 kcal/kg)  Protein:  118 - 138 g/day (1.2 - 1.4 g/kg)  Fluid:   ml/day or [X] defer to team  Based on: previous dosing wt of 98.7 kg (7/3) in consideration of advanced age, pressure injuries.     NUTRITION DIAGNOSIS:  [X] Prior Dx: (1) Severe Chronic Malnutrition (2) Increased Nutrient Needs  [] New Dx:    EDUCATION:  [] Yes:  [X] Not appropriate/warranted; Unable to at this time as pt deferring assessment, requesting to sleep. RD will continue to provide edu as able/appropriate.     NUTRITION CARE PLAN:  1. Defer diet to medical team; consider diet free of therapeutic diet restrictions to optimize PO intake; defer diet texture/consistency to speech language pathologist prn.   2. Trial oral nutrition supplementation: Ensure Plus High Protein (Provides 20g PRO, 350 Sebastian per serving) 1x, Ensure Clear Protein Shake ((provide 180 kcal, 8 g PRO per serving) 1x/d, Liquid Protein Supplementation (per serving provides 15 g PRO, 100 kcal) 1x, Magic Cup (per serving provides 9 g PRO, 290 kcal) 2x to optimize PO intake.            [X] Trend POCTs; consider adjusting oral nutrition supplementation as needed.            [x] Trend K+, Mg, Phos and replete as needed.   3. Continue with folic acid, thiamine supplementation for refeeding/micronutrient coverage, pending no medical contraindications.   4: Continue to address pt's/family's nutrition GOC regarding long term nutrition support as medically appropriate/able.            [x] Pt would likely benefit from appetite stimulant and/or supplementatal enteral nutrition if within further GOC.       MONITORING AND EVALUATION:   RD remains available upon request and will follow up per protocol.    Aida Rogel RD  Available on [facility] TEAMS

## 2024-08-16 NOTE — PROGRESS NOTE ADULT - PROBLEM SELECTOR PLAN 4
fall Patient with failure to thrive iso metastatic disease as well as repeated intubation and difficulty swallowing.  MBS 8/6 showing no gross aspiration, but still high-risk. Recommended pureed diet with mildly thickened liquids and aspiration precautions given patient and family desire PO diet. NG tube removed 8/6 after study.    Plan:  - Continue diet, to be supplemented with RD recs  - Monitor electrolytes as high risk for refeeding syndrome  - GOC meeting with family (wife, daughter) and palliative last week; further palliative follow-up this week, wife asking to push meeting to next week to involve family members Patient with failure to thrive iso metastatic disease as well as repeated intubation and difficulty swallowing.  MBS 8/6 showing no gross aspiration, but still high-risk. Recommended pureed diet with mildly thickened liquids and aspiration precautions given patient and family desire PO diet. NG tube removed 8/6 after study.    Plan:  - Continue diet, to be supplemented with RD recs  - Monitor electrolytes as high risk for refeeding syndrome  - GOC meeting with family (wife, daughter) and palliative last week; family meeting next Thursday 3pm  - Plan for out of bed into chair today

## 2024-08-16 NOTE — PROGRESS NOTE ADULT - PROBLEM SELECTOR PLAN 2
Patient's profound edema likely 2/2 large volume in during this admission as well as malnutrition. Previous POCUS have not identified a cardiac cause. Patient currently with cough. XR today with similar bilateral pleural effusions. Albumin improved to 3.1.    Plan:  - Continue Albumin 25% 50mL q8hr?  - Give Bumex 2mg IV tid? today - SCr stable  - Strict I/Os Patient's profound edema likely 2/2 large volume in during this admission as well as malnutrition. Previous POCUS have not identified a cardiac cause. Patient currently with cough. XR today with similar bilateral pleural effusions. Albumin improved to 3.1.    Plan:  - Continue Albumin 25% 50mL q8hr?  - Give Bumex 2mg IV *tid?* today - SCr stable  - Strict I/Os Patient's profound edema likely 2/2 large volume in during this admission as well as malnutrition. Previous POCUS have not identified a cardiac cause. Patient currently with cough. XR today with similar bilateral pleural effusions. Albumin improved to 3.1.    Plan:  - Hold albumin given improvement in labs  - Give Bumex 2mg IV daily given increase in SCr since admission  - Strict I/Os

## 2024-08-16 NOTE — ADVANCED PRACTICE NURSE CONSULT - REASON FOR CONSULT
Vascular Access Team    Evaluation for: MIDLINE placement at bedside   Indication: IV access  Requested by name: Matthew Jalloh    Allergy to CHG/Heparin/Lidocaine: no    Platelets(>20): 117  INR(<3): 1.19  eGFR(>40): 93  Pending blood cultures: N/A  IR or Nephrology or ID clearance needed: no    Anticoagulants: no   UE DVT: no   Mastectomy: no   Fistula: no     Plan: Bedside midline order evaluated; will schedule for placement within 24-48 hrs. Please call VAT RN at [phone] with any questions.  
Requested by staff to assess skin status: left buttocks. PMH is noted:  HPI:  79M hx of metastatic neuroendocrine pancreatic cancer, hx of CAD s/p PCI x3 with stents placed on 6/12/24, chronic afib, orthostatic hypotension on midodrine, and PAD here for UGIB and hemorrhagic shock requiring MICU for pressors, course complicated by C Diff, afib RVR, and RAZIA iso hypotension. Transferred to floors with intermittent melena and Hgb drops; currently no acute surgical or GI intervention recommended. GOC discussions ongoing with palliative.  The pt was last seen by the Wound Care team on 8/13

## 2024-08-16 NOTE — ADVANCED PRACTICE NURSE CONSULT - ASSESSMENT
Midline Catheter Insertion Note    Catheter type: 4F  : Bard  Power injectable: Yes  Lot# [id number]                                                                                                                                                                                                          Procedure assisted by: JANIYA Dejesus RN  Time out was preformed, confirming the patient's first and last name, date of birth, procedure, and correct site prior to state of procedure.    Patient was placed with HOB 30 degrees. Patient placement site was prepped with chlorhexidine solution, then draped using maximum sterile barrier protection. The area was injected with 2cc of 1% Lidocaine. Using the Bard Site Rite 8, the catheter was placed using the Modified Seldinger Technique. Strict adherence to outline aseptic technique including handwashing, glove and gown, utilizing mask and cap, plus draping the patient with a sterile drape was observed. Upon completion of line placement, the insertion site was covered with a sterile occlusive CHG dressing. Pt tolerated procedure well.     All materials used for catheter insertion, including the intact guide wires, were accounted for at the end of the procedure.  Number of attempts: 1  Complications/Comments: None    Emergency Placement: No  Site: New  Anatomical Site of insertion: Left Basilic  Catheter size/length: 4F, 20cm  US guided Bard single lumen power midline placed  
the pt was encountered on [facility]- Mr Espinal was awake and alert, engaging in conversation; initially resistant  to being turned for assessment but with education agreed. Staff at bedside reports that pt favors being on his back and stays turned on his side for very short periods. staff are using the dick-form positioner for this. the heels are being off-loaded with Complete Cair boots.  the pts appetite is poor - he is being followed by nutrition for a diagnosis of malnutrition.  the pt is incontinent of urine and stool.   upon assessment, the pt presents with a wound on the left buttocks measuring 1cmx 3cm x0.2cm with stringy yellow slough- the skin in the perineal area presents with blanchable erythema this may be skin damage secondary to incontinence but with nonviable tissue cannot r/o a component of deep tissue injury. triad paste was applied as a barrier and to promote the debridement of the non-viable tissue.  on the right upper back was a wound measuring 7cmx 4cm x0.2cm. there was 20% epithelialized tissue, 60% moist red tissue and 20% adherent eschar. there was moderate drainage noted on the foam dressing, there was no odor noted - the periwound skin presented with blanchable erythema. will recommend to continue with  honey and foam as recommended - the dressing was changed and homey and a foam applied.  education was provided re: condition of wounds, tx plan and PI prevention

## 2024-08-16 NOTE — PROGRESS NOTE ADULT - PROBLEM SELECTOR PLAN 7
Patient with episodes of NSVT throughout admission.    Plan:  - Continue metoprolol to 25mg bid  - Replete electrolytes as necessary  - Continue to monitor Patient with episodes of NSVT throughout admission.    Plan:  - Continue metoprolol 25mg bid  - Replete electrolytes as necessary  - Continue to monitor

## 2024-08-16 NOTE — PROGRESS NOTE ADULT - ATTENDING COMMENTS
-Hgb stable. -Last PRBC given 8/13. C/w PPI BID.   -Getting chest Vest for cough.   -LE and UE edema improved. C/w elevation and wrapping. -C/w IV bumex 2mg daily for now. -Last albumin > 3, so hold off on further supplementation for now. -Consider further if goes down further and/or edema worse.   -Digoxin level still low. Cr stable, though has increased a bit since admission. -Will increase digoxin to daily dosing per cards recs. F/u repeat dig level in 3 days. -Metoprolol at 25mg BID for Afib. Not on full AC in setting of recent GIB.   -OOB to chair if/when able.   -Repleting low K and electrolytes.   -F/u with palliative care for family meeting for next week.   -D/w house staff.

## 2024-08-16 NOTE — PROGRESS NOTE ADULT - SUBJECTIVE AND OBJECTIVE BOX
Patient is a 79y old  Male who presents with a chief complaint of hypotension (15 Aug 2024 10:17)      8/16/2024 Heart rate up to 130s on tele overnight.     MEDICATIONS  (STANDING):  albumin human 25% IVPB 50 milliLiter(s) IV Intermittent every 8 hours  AQUAPHOR (petrolatum Ointment) 1 Application(s) Topical two times a day  buMETAnide Injectable 2 milliGRAM(s) IV Push <User Schedule>  chlorhexidine 2% Cloths 1 Application(s) Topical <User Schedule>  dextrose 50% Injectable 25 Gram(s) IV Push once  digoxin     Tablet 125 MICROGram(s) Oral every other day  folic acid Injectable 1 milliGRAM(s) IV Push daily  guaiFENesin Oral Liquid (Sugar-Free) 300 milliGRAM(s) Oral every 6 hours  insulin lispro (ADMELOG) corrective regimen sliding scale   SubCutaneous at bedtime  insulin lispro (ADMELOG) corrective regimen sliding scale   SubCutaneous three times a day before meals  metoprolol tartrate 25 milliGRAM(s) Oral two times a day  midodrine 10 milliGRAM(s) Oral every 8 hours  octreotide  Injectable 250 MICROGram(s) IV Push three times a day  pantoprazole  Injectable 40 milliGRAM(s) IV Push every 12 hours  sodium chloride 3%  Inhalation 4 milliLiter(s) Inhalation every 12 hours  thiamine Injectable 100 milliGRAM(s) IV Push daily    MEDICATIONS  (PRN):  ondansetron Injectable 4 milliGRAM(s) IV Push once PRN Nausea and/or Vomiting      ROS  No fever, sweats, chills  No epistaxis, HA, sore throat  No CP, SOB, cough, sputum  No n/v/d, abd pain, melena, hematochezia  No edema  No rash  No anxiety  No back pain, joint pain  No bleeding, bruising  No dysuria, hematuria    Vital Signs Last 24 Hrs  T(C): 36.3 (15 Aug 2024 11:35), Max: 36.6 (14 Aug 2024 20:59)  T(F): 97.4 (15 Aug 2024 11:35), Max: 97.8 (14 Aug 2024 20:59)  HR: 73 (15 Aug 2024 14:33) (73 - 106)  BP: 109/67 (15 Aug 2024 14:33) (103/65 - 125/76)  BP(mean): --  RR: 18 (15 Aug 2024 11:35) (18 - 20)  SpO2: 98% (15 Aug 2024 11:35) (96% - 98%)    Parameters below as of 15 Aug 2024 11:35  Patient On (Oxygen Delivery Method): nasal cannula  O2 Flow (L/min): 2      PE  NAD  Awake, alert  Anicteric, MMM  RRR  CTAB  Abd soft, NT, ND  No c/c/e  No rash grossly  FROM                          8.9    6.15  )-----------( 114      ( 15 Aug 2024 11:27 )             28.6       08-15    145  |  107  |  27<H>  ----------------------------<  119<H>  3.6   |  22  |  1.17    Ca    8.2<L>      15 Aug 2024 11:27  Phos  3.4     08-15  Mg     1.8     08-15    TPro  5.3<L>  /  Alb  3.1<L>  /  TBili  0.8  /  DBili  0.3  /  AST  30  /  ALT  15  /  AlkPhos  191<H>  08-15

## 2024-08-16 NOTE — PROGRESS NOTE ADULT - PROBLEM SELECTOR PLAN 1
Patient with persistent UGIB with coffee ground emesis and melanotic stools multiple times requiring IR GDA embolization initially and then EGD s/p 3 clips for duodenal ulcer. Patient has been off of his aspirin and plavix for his very recent stent and Eliquis for his chronic afib since he has required so many repeat transfusions due to this active blood loss. The hemorrhagic shock seems to have temporarily resolved. Patient's Hgb continues to have intermittent drops requiring single transfusions of pRBCs. CTAP 8/4 and 8/8 showed no strong indications of active bleeding.    Plan:  - F/u fibrinogen; PT/INR normal ranges  - Continue midodrine 10mg q8 as necessary  - Protonix BID per GI  - Hold all AC and anti-platelet agents  - Continue CBC q24 unless having active bleeding, transfuse Hgb <8  - Maintain T&S q48 hours (most recent 8/16)  - Can consider iron chelation therapy given many transfusions Patient with persistent UGIB with coffee ground emesis and melanotic stools multiple times requiring IR GDA embolization initially and then EGD s/p 3 clips for duodenal ulcer. Patient has been off of his aspirin and plavix for his very recent stent and Eliquis for his chronic afib since he has required so many repeat transfusions due to this active blood loss. The hemorrhagic shock seems to have temporarily resolved. Patient's Hgb continues to have intermittent drops requiring single transfusions of pRBCs. CTAP 8/4 and 8/8 showed no strong indications of active bleeding. Hgb currently stable for several days.    Plan:  - F/u fibrinogen; PT/INR normal ranges  - Continue midodrine 10mg q8 as necessary  - Protonix BID per GI; consider switching to PO if patient can tolerate pills  - Hold all AC and anti-platelet agents  - Continue CBC q24 unless having active bleeding, transfuse Hgb <8  - Maintain T&S q48 hours (most recent 8/16)  - Can consider iron chelation therapy given many transfusions

## 2024-08-16 NOTE — PROGRESS NOTE ADULT - ASSESSMENT
79 yo male with PMH of CAD s/p PCI x3 with most recent stent 6/12/24 on Plavix, chronic Afib on eliquis, orthostatic hypotension on midodrine, PAD, neuroendocrine tumor with RT currently on hold, recent admission for dx cath on 6/24/24 who presented for hypotension, admitted for GIB and hemorrhagic shock. Found to be bleeding from duodenum. Transferred from MICU to floors, off pressors.     1. Pancreatic NET- metastatic   -- was on lanreotide then had POD in liver and started on peptide receptor radiotherapy (PRRT)- typically given every 8 weeks for 4 doses. He received one dose on 10/20/2023 and planned to get his 2nd dose at the end of December however his course was complicated by multiple hospitalizations that were noncancer related (decompensated heart failure).   -- 5/28/24 PET Dotatate (compared to 9/2023): several new somatostatin avid osseous lesions, some faintly sclerotic, suspicious for mets. Increased upper RP nodes, probably metastatic. Decreased intensely tracer avid right supradiaphragmatic and upper abdominal nodes, suspicious for metastasis. Redemonstrated intensely somatostatin avid bilobar hepatic lesions, consistent with metastases again morphologically difficult to delineate.   -- CT reviewed by [facility]: SINCE MAY 8 2024 INCREASED HEPATIC METASTASES, NEWLY SEEN PRIMARY TUMOR IN THE PANCREAS, and active bleeding within the duodenum with associated intraluminal hematoma.   -- chromogranin level is elevated at 2058, but no prior level at [facility] for review   -- f/u with Dr. Sophia Prasad at [facility] after discharge, no plan for chemotherapy inpatient, if clinically improves/stabilizes then can be considered for treatment outpatient  --Continue octreotide 250mcg TID  --Goals of care meeting next week as wife would like other family members involved    2. Duodenal bleed, Hemorrhagic shock  - Now transferred to medicine from MICU, s/p extubation 7/22. NG tube in place.  - CT w/ bleed in duodenum. GI called, EGD 7/9 -> S/p  IR embolization 7/9, intubated in MICU -> s/p extubation 7/15 -> intubated 7/18  - s/p multiple transfusions, fibrinogen low would recommend Cryo for < 100, but coags have improved as are essentially normal now so unlikely, probably was related to liver dysfunction.   - s/p repeat EGD 7/12 showing duodenal lesions w/clots and oozing, no clear targets for intervention and no active bleeding, purastat applied to all areas of oozing.   - S/p EGD 7/18: duodenal ulcer with active oozing, ulcer with visible vessel. Bleeding treated.   - s/p multiple transfusions, per GI, high risk for rebleed, will continue PPI.   - Repeat CT AP on w/ No evidence of GI bleed.  Interval endoscopic versus surgical intervention with metallic streak artifact suggestive of surgical clips in the region of the proximal one third portions of the duodenum. Evaluation of the previously seen hematoma is limited secondary to this artifact. Moderate bilateral pleural effusions and associated compressive atelectasis, right greater than left, overall slightly increased from previous CT. Anasarca.  - Per IR, In the absences of any active extravasation on CTA there's no place to target for an embolization  - 7/29 sputum culture + Pseudomonas; RRT on 8/2 due to tachycardia, hypotension; febrile to 101. Currently on cefepime x 7 days  - Continue octreotide 250 mcg TID  - CT AP 8/4: No evidence of active intraluminal extravasation of contrast. No evidence of acute intraperitoneal or retroperitoneal hemorrhage  - Underwent swallow assessment, started mildly thick liquids  - 8/8 CTA a/p d/t reports of melena, H/H stable--> Limited evaluation for active gastrointestinal hemorrhage due to high density oral contrast within the colon and streak artifact from orthopedic hardware, embolization material, and duodenal clips. Question a focus of enhancement in the duodenum adjacent to the duodenal clips versus streak artifact, with active hemorrhage within the duodenum not excluded.    3. C. diff   - diarrhea resolved, off vanco    4. Anasarca  --albumin Q8, bumex BID    Will continue to follow.    Gaby Randle NP  Hematology/ Oncology  [facility]  [phone] (office)  [phone] (alt office)  Evenings and weekends please call MD on call or office

## 2024-08-16 NOTE — PROGRESS NOTE ADULT - SUBJECTIVE AND OBJECTIVE BOX
DATE OF SERVICE: 08-16-24 @ 14:49    Patient is a 79y old  Male who presents with a chief complaint of hypotension (16 Aug 2024 13:30)      INTERVAL HISTORY: In no acute distress.     REVIEW OF SYSTEMS:   CONSTITUTIONAL: No weakness  EYES/ENT: No visual changes;  No throat pain   NECK: No pain or stiffness  RESPIRATORY: No cough, wheezing; No shortness of breath  CARDIOVASCULAR: No chest pain or palpitations  GASTROINTESTINAL: No abdominal  pain. No nausea, vomiting, or hematemesis  GENITOURINARY: No dysuria, frequency or hematuria  NEUROLOGICAL: No stroke like symptoms  SKIN: No rashes    TELEMETRY Personally reviewed: AF  freq PVC, Bigem, Trigem  	  MEDICATIONS:  buMETAnide Injectable 2 milliGRAM(s) IV Push daily  digoxin     Tablet 125 MICROGram(s) Oral every other day  metoprolol tartrate 25 milliGRAM(s) Oral two times a day  midodrine 10 milliGRAM(s) Oral every 8 hours        PHYSICAL EXAM:  T(C): 36.9 (08-16-24 @ 12:53), Max: 36.9 (08-16-24 @ 12:53)  HR: 105 (08-16-24 @ 13:08) (64 - 107)  BP: 107/67 (08-16-24 @ 13:08) (105/65 - 125/66)  RR: 18 (08-16-24 @ 13:08) (18 - 18)  SpO2: 94% (08-16-24 @ 13:08) (94% - 98%)  Wt(kg): --  I&O's Summary    15 Aug 2024 07:01  -  16 Aug 2024 07:00  --------------------------------------------------------  IN: 100 mL / OUT: 1251 mL / NET: -1151 mL    16 Aug 2024 07:01  -  16 Aug 2024 14:49  --------------------------------------------------------  IN: 0 mL / OUT: 1300 mL / NET: -1300 mL          Appearance: In no distress	  HEENT:    PERRL, EOMI	  Cardiovascular:  S1 S2, +JVD  Respiratory: Lungs clear to auscultation	  Gastrointestinal:  Soft, Non-tender, + BS	  Vascularature:  +edema of LE  Psychiatric: Appropriate affect   Neuro: no acute focal deficits                               9.2    5.26  )-----------( 116      ( 16 Aug 2024 05:47 )             29.5     08-16    144  |  106  |  27<H>  ----------------------------<  134<H>  3.2<L>   |  25  |  1.07    Ca    8.4      16 Aug 2024 05:47  Phos  2.9     08-16  Mg     1.6     08-16    TPro  5.3<L>  /  Alb  3.1<L>  /  TBili  0.8  /  DBili  0.3  /  AST  30  /  ALT  15  /  AlkPhos  191<H>  08-15        Labs personally reviewed      ASSESSMENT/PLAN: 	    78-year-old male multiple medical problems history of hepatocellular carcinoma status post radiation therapy, AF s/p DCCV on Eliquis who was found to be hypotensive following NST. Patient has reported general weakness, fatigue, lightheadedness since being discharged from hospital. Reports mild chest discomfort in midsternal region. Reports dyspnea with minimal exertion. Also reports significant lack of appetite and poor PO intake. No dark or bloody stool, nausea or vomiting.       Problem/Plan - 1:  ·  Problem: Hypotension  ·  Plan: s/p pressors in MICU.  Now transferred to [facility]  - Patient presents with hypotension and also RAZIA. Has known orthostatic hypotension.   - Patient and wife report decreased PO intake likely 2/2 malignancy.  - GIB 7/9, s/p multiple units prbc, IR for mesenteric embolization; Repeat CT A/P with no extravazation  - c/w Midodrine 10mg PO [id number]  - CT A/P inconclusive     Problem/Plan - 2:  ·  Problem: CAD.   ·  Plan: Recent PCI to pLAD in June 2023  - ECG non-ischemic  - Plavix held given large melena; ideally should be on Plavix but high risk to resume      Problem/Plan - 3:  ·  Problem: Atrial Fibrillation  - s/p succesful DCCV 10/31  - Hold Eliquis 5mg BID given GIB  - s/p Amio  - c/w Dig 125mcg PO QOD--> dig level low. Can consider increasing frequency to daily  - Metoprolol 25mg BID     Problem/Plan - 4:  ·  Problem: Acute Hypoxic Respiratory Failure  - Now requiring 3L NC  - CT Chest shows B/L pleural effusion and atelectasis  - BNP 12K  - s/p Lasix 40mg IV once with albumin 8/9  - s/p IV administration of albumin 8/12  - Cont with Bumex 2mg IV daily; Transition to 2mg PO daily when no longer hypoxic          Sulma Espinosa, ROSIO-NP   Hank Hayes DO [facility]  Cardiovascular Medicine  [address] [place] [address], [address]  Available through call or text on [employer]  Office: [phone]   DATE OF SERVICE: 08-16-24 @ 14:49    Patient is a 79y old  Male who presents with a chief complaint of hypotension (16 Aug 2024 13:30)      INTERVAL HISTORY: In no acute distress.     REVIEW OF SYSTEMS:   CONSTITUTIONAL: No weakness  EYES/ENT: No visual changes;  No throat pain   NECK: No pain or stiffness  RESPIRATORY: No cough, wheezing; No shortness of breath  CARDIOVASCULAR: No chest pain or palpitations  GASTROINTESTINAL: No abdominal  pain. No nausea, vomiting, or hematemesis  GENITOURINARY: No dysuria, frequency or hematuria  NEUROLOGICAL: No stroke like symptoms  SKIN: No rashes    TELEMETRY Personally reviewed: AF  freq PVC, Bigem, Trigem  	  MEDICATIONS:  buMETAnide Injectable 2 milliGRAM(s) IV Push daily  digoxin     Tablet 125 MICROGram(s) Oral every other day  metoprolol tartrate 25 milliGRAM(s) Oral two times a day  midodrine 10 milliGRAM(s) Oral every 8 hours        PHYSICAL EXAM:  T(C): 36.9 (08-16-24 @ 12:53), Max: 36.9 (08-16-24 @ 12:53)  HR: 105 (08-16-24 @ 13:08) (64 - 107)  BP: 107/67 (08-16-24 @ 13:08) (105/65 - 125/66)  RR: 18 (08-16-24 @ 13:08) (18 - 18)  SpO2: 94% (08-16-24 @ 13:08) (94% - 98%)  Wt(kg): --  I&O's Summary    15 Aug 2024 07:01  -  16 Aug 2024 07:00  --------------------------------------------------------  IN: 100 mL / OUT: 1251 mL / NET: -1151 mL    16 Aug 2024 07:01  -  16 Aug 2024 14:49  --------------------------------------------------------  IN: 0 mL / OUT: 1300 mL / NET: -1300 mL          Appearance: In no distress	  HEENT:    PERRL, EOMI	  Cardiovascular:  S1 S2, +JVD  Respiratory: Lungs clear to auscultation	  Gastrointestinal:  Soft, Non-tender, + BS	  Vascularature:  +edema of LE  Psychiatric: Appropriate affect   Neuro: no acute focal deficits                               9.2    5.26  )-----------( 116      ( 16 Aug 2024 05:47 )             29.5     08-16    144  |  106  |  27<H>  ----------------------------<  134<H>  3.2<L>   |  25  |  1.07    Ca    8.4      16 Aug 2024 05:47  Phos  2.9     08-16  Mg     1.6     08-16    TPro  5.3<L>  /  Alb  3.1<L>  /  TBili  0.8  /  DBili  0.3  /  AST  30  /  ALT  15  /  AlkPhos  191<H>  08-15        Labs personally reviewed      ASSESSMENT/PLAN: 	    78-year-old male multiple medical problems history of hepatocellular carcinoma status post radiation therapy, AF s/p DCCV on Eliquis who was found to be hypotensive following NST. Patient has reported general weakness, fatigue, lightheadedness since being discharged from hospital. Reports mild chest discomfort in midsternal region. Reports dyspnea with minimal exertion. Also reports significant lack of appetite and poor PO intake. No dark or bloody stool, nausea or vomiting.       Problem/Plan - 1:  ·  Problem: Hypotension  ·  Plan: s/p pressors in MICU.  Now transferred to [facility]  - Patient presents with hypotension and also RAZIA. Has known orthostatic hypotension.   - Patient and wife report decreased PO intake likely 2/2 malignancy.  - GIB 7/9, s/p multiple units prbc, IR for mesenteric embolization; Repeat CT A/P with no extravazation  - c/w Midodrine 10mg PO [id number]  - CT A/P inconclusive     Problem/Plan - 2:  ·  Problem: CAD.   ·  Plan: Recent PCI to pLAD in June 2023  - ECG non-ischemic  - Plavix held given large melena; ideally should be on Plavix but high risk to resume      Problem/Plan - 3:  ·  Problem: Atrial Fibrillation  - s/p succesful DCCV 10/31  - Hold Eliquis 5mg BID given GIB  - s/p Amio  - c/w Dig 125mcg PO QOD--> dig level low. Can consider increasing frequency to daily  - Metoprolol 25mg BID     Problem/Plan - 4:  ·  Problem: Acute Hypoxic Respiratory Failure  - Now requiring 3L NC  - CT Chest shows B/L pleural effusion and atelectasis  - BNP 12K  - s/p Lasix 40mg IV once with albumin 8/9  - s/p IV administration of albumin 8/12  - Cont with Bumex 2mg IV daily; Transition to 2mg PO daily when no longer hypoxic          Sulma Espinosa, ROSIO-NP   Hank Hayes DO [facility]  Cardiovascular Medicine  [address] [place] [address], [address]  Available through call or text on [employer] TEAM[employer]  Office: [phone]

## 2024-08-16 NOTE — PROGRESS NOTE ADULT - PROBLEM SELECTOR PLAN 3
Patient with cough over past 2 weeks and unable to bring up secretions.    Plan:  - Continue Mucinex  - Continue manual chest PT  - Consider filomena Patient with cough over past 2 weeks and unable to bring up secretions. CXR with bilateral pleural effusions. Previous sputum 7/29 with carbapenem resistant pseudomonas.    Plan:  - F/u sputum culture  - Continue Mucinex  - Continue manual chest PT  - Consider duonebs Patient with cough over past 2 weeks and unable to bring up secretions. CXR with bilateral pleural effusions. Previous sputum 7/29 with carbapenem resistant pseudomonas.    Plan:  - F/u sputum culture once collected  - Continue Mucinex  - Continue chest vest PT  - Consider duonebs

## 2024-08-16 NOTE — PROGRESS NOTE ADULT - NS ATTEND AMEND GEN_ALL_CORE FT
Agree with above assessment and plan.  Continue with octreotide. Monitor hemoglobin, last PRBC on 8/13. Overall poor prognosis Agree with above assessment and plan.  Continue with octreotide. Monitor hemoglobin, last PRBC on 8/13. Overall poor prognosis. Family meeting planned for next week.

## 2024-08-16 NOTE — ADVANCED PRACTICE NURSE CONSULT - RECOMMEDATIONS
Will recommend the followin. b/l buttocks: routine pericare with Triad paste, apply twice daily and prn for soiling  2. right upper back: Cavilon to periwound skin, medihoney paste follow with  foam change every other day and prn for drainage  3. continue with heel off-loading  4. continue to encourage mobility, T&P  5. seat cushion when OOB to chair  6. nutrition support as pt condition allows  Tx plan discussed with RN

## 2024-08-16 NOTE — PROGRESS NOTE ADULT - PROBLEM SELECTOR PLAN 9
Hospice vs GAIL  will continue to follow  case discussed with primary team  Can be reached by TEAMS M-F 9-5 Marium Weiner Any other time please page [phone] if needed

## 2024-08-16 NOTE — PROGRESS NOTE ADULT - PROBLEM SELECTOR PLAN 8
pt remains full code  ongoing discussions  spoke with Yamilet via phone, discussed the importance of needing a family meeting. family meeting scheduled for 3p, family requesting GI, primary team be present

## 2024-08-17 NOTE — PROGRESS NOTE ADULT - ATTENDING COMMENTS
Jaye Allen MD  Division of Hospital Medicine  [facility]   Available on Microsoft Teams - messages preferred prior to calls.    Patient seen and examined today with Team 5 Resident and Intern. Agree with above findings, assessment, and plan with the following additions/exceptions:    80 yo male with PMH of of metastatic neuroendocrine pancreatic cancer, hx of CAD s/p PCI x3 with stents placed on 6/12/24, chronic afib, orthostatic hypotension on midodrine, and PAD who presented with hypotension found to have UGIB c/b hemorrhagic shock requiring MICU for pressor support with course complicated by C Diff, afib RVR, and RAZIA iso hypotension. Transferred to floors with intermittent melena and drop in Hb but overall stable with no acute surgical nor GI intervention recommended. GOC discussions ongoing.    Plan:  - Hb remains stable. last PRBC transfusion was 8/13  - c/w PPI IV BID, continue to hold eliquis given GI bleed  - anasarca continues to improve  - c/w bumex 2mg IV daily  - c/w midodrine 10mg TID to augment BP to allow for adequate diuresis  - c/w metoprolol for afib rate control; digoxin increased to daily dosing as per Cards recs due to low level. re-check level in 2 days  - chest vest to help clear secretions  - Palliative care consult appreciated. family meeting scheduled for Tues 8/21 @ 3pm    Dispo: GAIL pending further GOC    Rest as detailed in note above.    Plan discussed with patient and Team 5 Intern Dr. Okeefe.

## 2024-08-17 NOTE — PROGRESS NOTE ADULT - SUBJECTIVE AND OBJECTIVE BOX
Patient is a 79y old  Male who presents with a chief complaint of hypotension (14 Aug 2024 13:20)      SUBJECTIVE / OVERNIGHT EVENTS:       MEDICATIONS  (STANDING):  AQUAPHOR (petrolatum Ointment) 1 Application(s) Topical two times a day  chlorhexidine 2% Cloths 1 Application(s) Topical <User Schedule>  dextrose 50% Injectable 25 Gram(s) IV Push once  digoxin     Tablet 125 MICROGram(s) Oral every other day  folic acid Injectable 1 milliGRAM(s) IV Push daily  guaiFENesin Oral Liquid (Sugar-Free) 200 milliGRAM(s) Oral every 6 hours  insulin lispro (ADMELOG) corrective regimen sliding scale   SubCutaneous at bedtime  insulin lispro (ADMELOG) corrective regimen sliding scale   SubCutaneous three times a day before meals  metoprolol tartrate 25 milliGRAM(s) Oral two times a day  midodrine 10 milliGRAM(s) Oral every 8 hours  octreotide  Injectable 250 MICROGram(s) IV Push three times a day  pantoprazole  Injectable 40 milliGRAM(s) IV Push every 12 hours  sodium chloride 3%  Inhalation 4 milliLiter(s) Inhalation every 12 hours  thiamine Injectable 100 milliGRAM(s) IV Push daily    MEDICATIONS  (PRN):  ondansetron Injectable 4 milliGRAM(s) IV Push once PRN Nausea and/or Vomiting      CAPILLARY BLOOD GLUCOSE      POCT Blood Glucose.: 136 mg/dL (14 Aug 2024 22:15)  POCT Blood Glucose.: 119 mg/dL (14 Aug 2024 16:22)  POCT Blood Glucose.: 136 mg/dL (14 Aug 2024 12:15)  POCT Blood Glucose.: 134 mg/dL (14 Aug 2024 08:01)    I&O's Summary    14 Aug 2024 07:01  -  15 Aug 2024 07:00  --------------------------------------------------------  IN: 130 mL / OUT: 931 mL / NET: -801 mL        Vital Signs Last 24 Hrs  T(C): 36.6 (15 Aug 2024 04:02), Max: 36.6 (14 Aug 2024 20:59)  T(F): 97.8 (15 Aug 2024 04:02), Max: 97.8 (14 Aug 2024 20:59)  HR: 91 (15 Aug 2024 04:02) (77 - 114)  BP: 122/70 (15 Aug 2024 04:02) (95/60 - 125/76)  BP(mean): --  RR: 20 (15 Aug 2024 04:02) (18 - 20)  SpO2: 96% (15 Aug 2024 04:02) (95% - 98%)    Parameters below as of 15 Aug 2024 04:02  Patient On (Oxygen Delivery Method): nasal cannula  O2 Flow (L/min): 2      PHYSICAL EXAM:  GENERAL: NAD, ill-appearing, nasal cannula  HEAD: Atraumatic, Normocephalic  EYES: EOMI, conjunctiva and sclera clear  NECK: Supple  CHEST/LUNG: Upper airway sounds transmitted throughout  HEART: Normal S1/S2; Irregular rhythm; No murmurs, rubs, or gallops  ABDOMEN: Soft, Nontender, edematous  EXTREMITIES: Profound pitting edema in lower extremities until hips, legs covered by ACE bandages today, edematous in proximal extremities; greatly improved today  PSYCH: A&Ox1-2  NEUROLOGY: no focal neurologic deficit    LABS:                        9.0    7.00  )-----------( 101      ( 14 Aug 2024 10:51 )             28.9      08-14    145  |  107  |  28<H>  ----------------------------<  126<H>  4.1   |  22  |  1.16    Ca    8.1<L>      14 Aug 2024 10:51  Phos  3.6     08-14  Mg     2.0     08-14    TPro  5.2<L>  /  Alb  3.1<L>  /  TBili  0.8  /  DBili  x   /  AST  24  /  ALT  16  /  AlkPhos  189<H>  08-14    PT/INR - ( 13 Aug 2024 10:28 )   PT: 12.3 sec;   INR: 1.12 ratio         PTT - ( 13 Aug 2024 10:28 )  PTT:28.0 sec      Urinalysis Basic - ( 14 Aug 2024 10:51 )    Color: x / Appearance: x / SG: x / pH: x  Gluc: 126 mg/dL / Ketone: x  / Bili: x / Urobili: x   Blood: x / Protein: x / Nitrite: x   Leuk Esterase: x / RBC: x / WBC x   Sq Epi: x / Non Sq Epi: x / Bacteria: x      ADDITIONAL TESTS:    Consultant(s) Notes Reviewed: Heme/Onc, Palliative, Cardiology   Patient is a 79y old  Male who presents with a chief complaint of hypotension (14 Aug 2024 13:20)      SUBJECTIVE / OVERNIGHT EVENTS:   No overnight events noted. Patient appears more comfortable today. He states that his breathing is improved. He has had no new pains. He has no other complaints today.    MEDICATIONS  (STANDING):  AQUAPHOR (petrolatum Ointment) 1 Application(s) Topical two times a day  chlorhexidine 2% Cloths 1 Application(s) Topical <User Schedule>  dextrose 50% Injectable 25 Gram(s) IV Push once  digoxin     Tablet 125 MICROGram(s) Oral every other day  folic acid Injectable 1 milliGRAM(s) IV Push daily  guaiFENesin Oral Liquid (Sugar-Free) 200 milliGRAM(s) Oral every 6 hours  insulin lispro (ADMELOG) corrective regimen sliding scale   SubCutaneous at bedtime  insulin lispro (ADMELOG) corrective regimen sliding scale   SubCutaneous three times a day before meals  metoprolol tartrate 25 milliGRAM(s) Oral two times a day  midodrine 10 milliGRAM(s) Oral every 8 hours  octreotide  Injectable 250 MICROGram(s) IV Push three times a day  pantoprazole  Injectable 40 milliGRAM(s) IV Push every 12 hours  sodium chloride 3%  Inhalation 4 milliLiter(s) Inhalation every 12 hours  thiamine Injectable 100 milliGRAM(s) IV Push daily    MEDICATIONS  (PRN):  ondansetron Injectable 4 milliGRAM(s) IV Push once PRN Nausea and/or Vomiting      CAPILLARY BLOOD GLUCOSE      POCT Blood Glucose.: 136 mg/dL (14 Aug 2024 22:15)  POCT Blood Glucose.: 119 mg/dL (14 Aug 2024 16:22)  POCT Blood Glucose.: 136 mg/dL (14 Aug 2024 12:15)  POCT Blood Glucose.: 134 mg/dL (14 Aug 2024 08:01)    I&O's Summary    14 Aug 2024 07:01  -  15 Aug 2024 07:00  --------------------------------------------------------  IN: 130 mL / OUT: 931 mL / NET: -801 mL        Vital Signs Last 24 Hrs  T(C): 36.6 (15 Aug 2024 04:02), Max: 36.6 (14 Aug 2024 20:59)  T(F): 97.8 (15 Aug 2024 04:02), Max: 97.8 (14 Aug 2024 20:59)  HR: 91 (15 Aug 2024 04:02) (77 - 114)  BP: 122/70 (15 Aug 2024 04:02) (95/60 - 125/76)  BP(mean): --  RR: 20 (15 Aug 2024 04:02) (18 - 20)  SpO2: 96% (15 Aug 2024 04:02) (95% - 98%)    Parameters below as of 15 Aug 2024 04:02  Patient On (Oxygen Delivery Method): nasal cannula  O2 Flow (L/min): 2      PHYSICAL EXAM:  GENERAL: NAD, ill-appearing, nasal cannula, glasses  HEAD: Atraumatic, Normocephalic  EYES: EOMI, conjunctiva and sclera clear  NECK: Supple  CHEST/LUNG: Upper airway sounds transmitted diffusely  HEART: Normal S1/S2; Irregular rhythm; No murmurs, rubs, or gallops  ABDOMEN: Soft, Nontender, edematous  EXTREMITIES: Pitting edema in lower extremities until hips, legs covered by ACE bandages today, edematous in proximal extremities; continues to improve  PSYCH: A&Ox1-2  NEUROLOGY: no focal neurologic deficit    LABS:                        9.0    7.00  )-----------( 101      ( 14 Aug 2024 10:51 )             28.9      08-14    145  |  107  |  28<H>  ----------------------------<  126<H>  4.1   |  22  |  1.16    Ca    8.1<L>      14 Aug 2024 10:51  Phos  3.6     08-14  Mg     2.0     08-14    TPro  5.2<L>  /  Alb  3.1<L>  /  TBili  0.8  /  DBili  x   /  AST  24  /  ALT  16  /  AlkPhos  189<H>  08-14    PT/INR - ( 13 Aug 2024 10:28 )   PT: 12.3 sec;   INR: 1.12 ratio         PTT - ( 13 Aug 2024 10:28 )  PTT:28.0 sec      Urinalysis Basic - ( 14 Aug 2024 10:51 )    Color: x / Appearance: x / SG: x / pH: x  Gluc: 126 mg/dL / Ketone: x  / Bili: x / Urobili: x   Blood: x / Protein: x / Nitrite: x   Leuk Esterase: x / RBC: x / WBC x   Sq Epi: x / Non Sq Epi: x / Bacteria: x      ADDITIONAL TESTS:    Consultant(s) Notes Reviewed: Heme/Onc, Palliative, Cardiology

## 2024-08-17 NOTE — PROGRESS NOTE ADULT - SUBJECTIVE AND OBJECTIVE BOX
Patient is a 79y old  Male who presents with a chief complaint of hypotension (15 Aug 2024 10:17)    Subjective:  Patient seen at bedside.  No significant overnight events.     MEDICATIONS  (STANDING):  albumin human 25% IVPB 50 milliLiter(s) IV Intermittent every 8 hours  AQUAPHOR (petrolatum Ointment) 1 Application(s) Topical two times a day  buMETAnide Injectable 2 milliGRAM(s) IV Push <User Schedule>  chlorhexidine 2% Cloths 1 Application(s) Topical <User Schedule>  dextrose 50% Injectable 25 Gram(s) IV Push once  digoxin     Tablet 125 MICROGram(s) Oral every other day  folic acid Injectable 1 milliGRAM(s) IV Push daily  guaiFENesin Oral Liquid (Sugar-Free) 300 milliGRAM(s) Oral every 6 hours  insulin lispro (ADMELOG) corrective regimen sliding scale   SubCutaneous at bedtime  insulin lispro (ADMELOG) corrective regimen sliding scale   SubCutaneous three times a day before meals  metoprolol tartrate 25 milliGRAM(s) Oral two times a day  midodrine 10 milliGRAM(s) Oral every 8 hours  octreotide  Injectable 250 MICROGram(s) IV Push three times a day  pantoprazole  Injectable 40 milliGRAM(s) IV Push every 12 hours  sodium chloride 3%  Inhalation 4 milliLiter(s) Inhalation every 12 hours  thiamine Injectable 100 milliGRAM(s) IV Push daily    MEDICATIONS  (PRN):  ondansetron Injectable 4 milliGRAM(s) IV Push once PRN Nausea and/or Vomiting      ROS  No fever, sweats, chills  No epistaxis, HA, sore throat  No CP, SOB, cough, sputum  No n/v/d, abd pain, melena, hematochezia  No edema  No rash  No anxiety  No back pain, joint pain  No bleeding, bruising  No dysuria, hematuria    Vital Signs Last 24 Hrs  T(C): 36.2 (17 Aug 2024 09:09), Max: 37.1 (16 Aug 2024 20:33)  T(F): 97.2 (17 Aug 2024 09:09), Max: 98.7 (16 Aug 2024 20:33)  HR: 98 (17 Aug 2024 09:09) (78 - 107)  BP: 116/69 (17 Aug 2024 09:09) (99/60 - 116/69)  BP(mean): 62 (17 Aug 2024 04:08) (62 - 62)  RR: 18 (17 Aug 2024 09:09) (18 - 18)  SpO2: 96% (17 Aug 2024 09:09) (94% - 98%)    Parameters below as of 17 Aug 2024 09:09  Patient On (Oxygen Delivery Method): nasal cannula      PE  NAD  Awake, alert  Anicteric, MMM  RRR  CTAB  Abd soft, NT, ND  No c/c/e  No rash grossly  FROM                                            9.2    5.71  )-----------( 131      ( 17 Aug 2024 07:07 )             29.2   08-17    147<H>  |  107  |  25<H>  ----------------------------<  134<H>  3.3<L>   |  27  |  1.03    Ca    8.0<L>      17 Aug 2024 07:06  Phos  3.0     08-17  Mg     1.6     08-17    TPro  5.3<L>  /  Alb  3.1<L>  /  TBili  0.8  /  DBili  0.3  /  AST  30  /  ALT  15  /  AlkPhos  191<H>  08-15

## 2024-08-17 NOTE — PROGRESS NOTE ADULT - PROBLEM SELECTOR PLAN 7
Patient with episodes of NSVT throughout admission.    Plan:  - Continue metoprolol 25mg bid  - Replete electrolytes as necessary  - Continue to monitor

## 2024-08-17 NOTE — PROGRESS NOTE ADULT - PROBLEM SELECTOR PLAN 2
Patient's profound edema likely 2/2 large volume in during this admission as well as malnutrition. Previous POCUS have not identified a cardiac cause. Patient currently with cough. XR today with similar bilateral pleural effusions. Albumin improved to 3.1. Patient with appropriate output and clinically improving.    Plan:  - Hold albumin given improvement in labs  - Give Bumex 2mg IV daily given increase in SCr since admission  - Strict I/Os Patient's profound edema likely 2/2 large volume in during this admission as well as malnutrition. Previous POCUS have not identified a cardiac cause. Patient currently with cough. XR today with similar bilateral pleural effusions. Albumin improved to 3.1. Patient with appropriate output and clinically improving.    Plan:  - Hold albumin given improvement in labs  - Give Bumex 2mg IV only daily given increase in SCr since admission  - Strict I/Os Patient's profound edema likely 2/2 large volume in during this admission as well as malnutrition. Previous POCUS have not identified a cardiac cause. Patient currently with cough. XR today with similar bilateral pleural effusions. Albumin improved to 3.1 8/14. Patient with appropriate output and clinically improving.    Plan:  - Hold albumin given improvement in labs  - Give Bumex 2mg IV only daily given increase in SCr since admission  - Strict I/Os

## 2024-08-17 NOTE — PROGRESS NOTE ADULT - PROBLEM SELECTOR PLAN 1
Patient with persistent UGIB with coffee ground emesis and melanotic stools multiple times requiring IR GDA embolization initially and then EGD s/p 3 clips for duodenal ulcer. Patient has been off of his aspirin and plavix for his very recent stent and Eliquis for his chronic afib since he has required so many repeat transfusions due to this active blood loss. The hemorrhagic shock seems to have temporarily resolved. Fibrinogen and PT/INR in normal ranges. CTAP 8/4 and 8/8 showed no strong indications of active bleeding. Hgb currently stable for several days.    Plan:  - Continue midodrine 10mg q8 as necessary  - Protonix BID per GI; consider switching to PO if patient can tolerate pills  - Hold all AC and anti-platelet agents  - Continue CBC q24 unless having active bleeding, transfuse Hgb <8  - Maintain T&S q48 hours (most recent 8/16)  - Can consider iron chelation therapy given many transfusions

## 2024-08-17 NOTE — PROGRESS NOTE ADULT - SUBJECTIVE AND OBJECTIVE BOX
DATE OF SERVICE: 08-17-24 @ 11:22    Patient is a 79y old  Male who presents with a chief complaint of hypotension (17 Aug 2024 09:33)      INTERVAL HISTORY: doing well     REVIEW OF SYSTEMS:  CONSTITUTIONAL: No weakness  EYES/ENT: No visual changes;  No throat pain   NECK: No pain or stiffness  RESPIRATORY: No cough, wheezing; No shortness of breath  CARDIOVASCULAR: No chest pain or palpitations  GASTROINTESTINAL: No abdominal  pain. No nausea, vomiting, or hematemesis  GENITOURINARY: No dysuria, frequency or hematuria  NEUROLOGICAL: No stroke like symptoms  SKIN: No rashes    TELEMETRY Personally reviewed: AF  freq PVC, Bigem, Trigem    	  MEDICATIONS:  buMETAnide Injectable 2 milliGRAM(s) IV Push daily  digoxin     Tablet 125 MICROGram(s) Oral daily  metoprolol tartrate 25 milliGRAM(s) Oral two times a day  midodrine 10 milliGRAM(s) Oral every 8 hours        PHYSICAL EXAM:  T(C): 36.2 (08-17-24 @ 09:09), Max: 37.1 (08-16-24 @ 20:33)  HR: 98 (08-17-24 @ 09:09) (78 - 107)  BP: 116/69 (08-17-24 @ 09:09) (99/60 - 116/69)  RR: 18 (08-17-24 @ 09:09) (18 - 18)  SpO2: 96% (08-17-24 @ 09:09) (94% - 98%)  Wt(kg): --  I&O's Summary    16 Aug 2024 07:01  -  17 Aug 2024 07:00  --------------------------------------------------------  IN: 0 mL / OUT: 1750 mL / NET: -1750 mL          Appearance: In no distress	  HEENT:    PERRL, EOMI	  Cardiovascular:  S1 S2, No JVD  Respiratory: Lungs clear to auscultation	  Gastrointestinal:  Soft, Non-tender, + BS	  Vascularature:  No edema of LE  Psychiatric: Appropriate affect   Neuro: no acute focal deficits                               9.2    5.71  )-----------( 131      ( 17 Aug 2024 07:07 )             29.2     08-17    147<H>  |  107  |  25<H>  ----------------------------<  134<H>  3.3<L>   |  27  |  1.03    Ca    8.0<L>      17 Aug 2024 07:06  Phos  3.0     08-17  Mg     1.6     08-17    TPro  5.3<L>  /  Alb  3.1<L>  /  TBili  0.8  /  DBili  0.3  /  AST  30  /  ALT  15  /  AlkPhos  191<H>  08-15        Labs personally reviewed      ASSESSMENT/PLAN: 	  78-year-old male multiple medical problems history of hepatocellular carcinoma status post radiation therapy, AF s/p DCCV on Eliquis who was found to be hypotensive following NST. Patient has reported general weakness, fatigue, lightheadedness since being discharged from hospital. Reports mild chest discomfort in midsternal region. Reports dyspnea with minimal exertion. Also reports significant lack of appetite and poor PO intake. No dark or bloody stool, nausea or vomiting.       Problem/Plan - 1:  ·  Problem: Hypotension  ·  Plan: s/p pressors in MICU.  [facility]  - Patient presents with hypotension and also RAZIA. Has known orthostatic hypotension.   - Patient and wife report decreased PO intake likely 2/2 malignancy.  - GIB 7/9, s/p multiple units prbc, IR for mesenteric embolization; Repeat CT A/P with no extravazation  - c/w Midodrine 10mg PO [id number]  - CT A/P inconclusive     Problem/Plan - 2:  ·  Problem: CAD.   ·  Plan: Recent PCI to pLAD in June 2023  - ECG non-ischemic  - Plavix held given large melena; ideally should be on Plavix but high risk to resume      Problem/Plan - 3:  ·  Problem: Atrial Fibrillation  - s/p succesful DCCV 10/31  - Hold Eliquis 5mg BID given GIB  - s/p Amio  - c/w Dig 125mcg PO QOD--> dig level low. Can consider increasing frequency to daily  - Metoprolol 25mg BID     Problem/Plan - 4:  ·  Problem: Acute Hypoxic Respiratory Failure  - Now requiring 3L NC  - CT Chest shows B/L pleural effusion and atelectasis  - BNP 12K  - s/p Lasix 40mg IV once with albumin 8/9  - s/p IV administration of albumin 8/12  - Cont with Bumex 2mg IV daily; Transition to 2mg PO daily when no longer hypoxic          ESTEFANI Velez DO [facility]  Cardiovascular Medicine  [address] [place] [address], [address]  Available through call or text on [employer]  Office: [phone]

## 2024-08-17 NOTE — PROGRESS NOTE ADULT - PROBLEM SELECTOR PLAN 5
Patient with chronic afib unable to be on AC for this and seemingly minimally responsive to amiodarone. Cardiology following, no obvious underlying cause aside from hemorrhage, which is difficult to control.    Plan:  - Dig level 0.6 (low) 8/12. 0.7 (low) 8/16  - Continue digoxin to 125mcg every day  - Check trough in 2 days (not within 6 hours of giving dose)  - Holding eliquis Patient with chronic afib unable to be on AC for this and seemingly minimally responsive to amiodarone. Cardiology following, no obvious underlying cause aside from hemorrhage, which is difficult to control.    Plan:  - Dig level 0.6 (low) 8/12. 0.7 (low) 8/16  - Continue digoxin to 125mcg every day  - Check trough in 2 days (not within 6 hours of giving dose); ordered  - Holding eliquis

## 2024-08-17 NOTE — PROGRESS NOTE ADULT - PROBLEM SELECTOR PLAN 4
Patient with failure to thrive iso metastatic disease as well as repeated intubation and difficulty swallowing.  MBS 8/6 showing no gross aspiration, but still high-risk. Recommended pureed diet with mildly thickened liquids and aspiration precautions given patient and family desire PO diet. NG tube removed 8/6 after study.    Plan:  - Continue diet, to be supplemented with RD recs  - Per RD recs, consider appetite stimulant if in GOC, can consider, wife in favor  - Monitor electrolytes as high risk for refeeding syndrome  - GOC meeting with family (wife, daughter) and palliative last week; family meeting next Tuesday 3pm  - Plan for out of bed into chair once recliner brought into room Patient with failure to thrive iso metastatic disease as well as repeated intubation and difficulty swallowing.  MBS 8/6 showing no gross aspiration, but still high-risk. Recommended pureed diet with mildly thickened liquids and aspiration precautions given patient and family desire PO diet. NG tube removed 8/6 after study.    Plan:  - Continue diet, to be supplemented with RD recs  - Start mirtazepine 7.5mg qhs for appetite stimulation, uptitrate as necessary  - Monitor electrolytes as high risk for refeeding syndrome  - C meeting with family (wife, daughter) and palliative last week; family meeting this Tuesday 3pm  - Plan for out of bed into chair once recliner brought into room

## 2024-08-17 NOTE — PROGRESS NOTE ADULT - ASSESSMENT
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 Transferred to floors with intermittent melena and Hgb drops; currently no acute surgical or GI intervention recommended. GOC discussions ongoing with palliative. Currently continuing to diurese with overall improvement.

## 2024-08-18 NOTE — PROGRESS NOTE ADULT - PROBLEM SELECTOR PLAN 3
Patient with cough over past 2 weeks and unable to bring up secretions. CXR with bilateral pleural effusions. Previous sputum 7/29 with carbapenem resistant pseudomonas.    Plan:  - F/u sputum culture once collected  - Continue Mucinex  - Continue chest vest PT  - Continue 3% NaCl neb q12  - Consider duonebs

## 2024-08-18 NOTE — PROGRESS NOTE ADULT - SUBJECTIVE AND OBJECTIVE BOX
Patient is a 79y old  Male who presents with a chief complaint of hypotension (14 Aug 2024 13:20)      SUBJECTIVE / OVERNIGHT EVENTS:       MEDICATIONS  (STANDING):  AQUAPHOR (petrolatum Ointment) 1 Application(s) Topical two times a day  chlorhexidine 2% Cloths 1 Application(s) Topical <User Schedule>  dextrose 50% Injectable 25 Gram(s) IV Push once  digoxin     Tablet 125 MICROGram(s) Oral every other day  folic acid Injectable 1 milliGRAM(s) IV Push daily  guaiFENesin Oral Liquid (Sugar-Free) 200 milliGRAM(s) Oral every 6 hours  insulin lispro (ADMELOG) corrective regimen sliding scale   SubCutaneous at bedtime  insulin lispro (ADMELOG) corrective regimen sliding scale   SubCutaneous three times a day before meals  metoprolol tartrate 25 milliGRAM(s) Oral two times a day  midodrine 10 milliGRAM(s) Oral every 8 hours  octreotide  Injectable 250 MICROGram(s) IV Push three times a day  pantoprazole  Injectable 40 milliGRAM(s) IV Push every 12 hours  sodium chloride 3%  Inhalation 4 milliLiter(s) Inhalation every 12 hours  thiamine Injectable 100 milliGRAM(s) IV Push daily    MEDICATIONS  (PRN):  ondansetron Injectable 4 milliGRAM(s) IV Push once PRN Nausea and/or Vomiting      CAPILLARY BLOOD GLUCOSE      POCT Blood Glucose.: 136 mg/dL (14 Aug 2024 22:15)  POCT Blood Glucose.: 119 mg/dL (14 Aug 2024 16:22)  POCT Blood Glucose.: 136 mg/dL (14 Aug 2024 12:15)  POCT Blood Glucose.: 134 mg/dL (14 Aug 2024 08:01)    I&O's Summary    14 Aug 2024 07:01  -  15 Aug 2024 07:00  --------------------------------------------------------  IN: 130 mL / OUT: 931 mL / NET: -801 mL        Vital Signs Last 24 Hrs  T(C): 36.6 (15 Aug 2024 04:02), Max: 36.6 (14 Aug 2024 20:59)  T(F): 97.8 (15 Aug 2024 04:02), Max: 97.8 (14 Aug 2024 20:59)  HR: 91 (15 Aug 2024 04:02) (77 - 114)  BP: 122/70 (15 Aug 2024 04:02) (95/60 - 125/76)  BP(mean): --  RR: 20 (15 Aug 2024 04:02) (18 - 20)  SpO2: 96% (15 Aug 2024 04:02) (95% - 98%)    Parameters below as of 15 Aug 2024 04:02  Patient On (Oxygen Delivery Method): nasal cannula  O2 Flow (L/min): 2      PHYSICAL EXAM:  GENERAL: NAD, ill-appearing, nasal cannula, glasses  HEAD: Atraumatic, Normocephalic  EYES: EOMI, conjunctiva and sclera clear  NECK: Supple  CHEST/LUNG: Upper airway sounds transmitted diffusely  HEART: Normal S1/S2; Irregular rhythm; No murmurs, rubs, or gallops  ABDOMEN: Soft, Nontender, edematous  EXTREMITIES: Pitting edema in lower extremities until hips, legs covered by ACE bandages today, edematous in proximal extremities; continues to improve  PSYCH: A&Ox1-2  NEUROLOGY: no focal neurologic deficit    LABS:                        9.0    7.00  )-----------( 101      ( 14 Aug 2024 10:51 )             28.9      08-14    145  |  107  |  28<H>  ----------------------------<  126<H>  4.1   |  22  |  1.16    Ca    8.1<L>      14 Aug 2024 10:51  Phos  3.6     08-14  Mg     2.0     08-14    TPro  5.2<L>  /  Alb  3.1<L>  /  TBili  0.8  /  DBili  x   /  AST  24  /  ALT  16  /  AlkPhos  189<H>  08-14    PT/INR - ( 13 Aug 2024 10:28 )   PT: 12.3 sec;   INR: 1.12 ratio         PTT - ( 13 Aug 2024 10:28 )  PTT:28.0 sec      Urinalysis Basic - ( 14 Aug 2024 10:51 )    Color: x / Appearance: x / SG: x / pH: x  Gluc: 126 mg/dL / Ketone: x  / Bili: x / Urobili: x   Blood: x / Protein: x / Nitrite: x   Leuk Esterase: x / RBC: x / WBC x   Sq Epi: x / Non Sq Epi: x / Bacteria: x      ADDITIONAL TESTS:    Consultant(s) Notes Reviewed: Heme/Onc, Palliative, Cardiology   Patient is a 79y old  Male who presents with a chief complaint of hypotension (14 Aug 2024 13:20)      SUBJECTIVE / OVERNIGHT EVENTS:   No overnight events. Per nursing, no melena. Today patient appears comfortable. He endorses some abdominal pain but on further questioning, appears that he was talking about previous abdominal pain. He denies any new pains, and reports improvement in breathing. Patient with no other complaints.    MEDICATIONS  (STANDING):  AQUAPHOR (petrolatum Ointment) 1 Application(s) Topical two times a day  chlorhexidine 2% Cloths 1 Application(s) Topical <User Schedule>  dextrose 50% Injectable 25 Gram(s) IV Push once  digoxin     Tablet 125 MICROGram(s) Oral every other day  folic acid Injectable 1 milliGRAM(s) IV Push daily  guaiFENesin Oral Liquid (Sugar-Free) 200 milliGRAM(s) Oral every 6 hours  insulin lispro (ADMELOG) corrective regimen sliding scale   SubCutaneous at bedtime  insulin lispro (ADMELOG) corrective regimen sliding scale   SubCutaneous three times a day before meals  metoprolol tartrate 25 milliGRAM(s) Oral two times a day  midodrine 10 milliGRAM(s) Oral every 8 hours  octreotide  Injectable 250 MICROGram(s) IV Push three times a day  pantoprazole  Injectable 40 milliGRAM(s) IV Push every 12 hours  sodium chloride 3%  Inhalation 4 milliLiter(s) Inhalation every 12 hours  thiamine Injectable 100 milliGRAM(s) IV Push daily    MEDICATIONS  (PRN):  ondansetron Injectable 4 milliGRAM(s) IV Push once PRN Nausea and/or Vomiting      CAPILLARY BLOOD GLUCOSE      POCT Blood Glucose.: 136 mg/dL (14 Aug 2024 22:15)  POCT Blood Glucose.: 119 mg/dL (14 Aug 2024 16:22)  POCT Blood Glucose.: 136 mg/dL (14 Aug 2024 12:15)  POCT Blood Glucose.: 134 mg/dL (14 Aug 2024 08:01)    I&O's Summary    14 Aug 2024 07:01  -  15 Aug 2024 07:00  --------------------------------------------------------  IN: 130 mL / OUT: 931 mL / NET: -801 mL        Vital Signs Last 24 Hrs  T(C): 36.6 (15 Aug 2024 04:02), Max: 36.6 (14 Aug 2024 20:59)  T(F): 97.8 (15 Aug 2024 04:02), Max: 97.8 (14 Aug 2024 20:59)  HR: 91 (15 Aug 2024 04:02) (77 - 114)  BP: 122/70 (15 Aug 2024 04:02) (95/60 - 125/76)  BP(mean): --  RR: 20 (15 Aug 2024 04:02) (18 - 20)  SpO2: 96% (15 Aug 2024 04:02) (95% - 98%)    Parameters below as of 15 Aug 2024 04:02  Patient On (Oxygen Delivery Method): nasal cannula  O2 Flow (L/min): 2      PHYSICAL EXAM:  GENERAL: NAD, ill-appearing, nasal cannula, glasses  HEAD: Atraumatic, Normocephalic  EYES: EOMI, conjunctiva and sclera clear  NECK: Supple  CHEST/LUNG: Upper airway sounds transmitted in superior portion of chest, clear in inferior portions, improved  HEART: Normal S1/S2; Irregular rhythm; No murmurs, rubs, or gallops  ABDOMEN: Soft, Nontender, edematous  EXTREMITIES: 2+ pitting edema in lower extremities until hips, edematous in proximal extremities, but also improved  PSYCH: A&Ox1-2  NEUROLOGY: no focal neurologic deficit    LABS:                        9.0    7.00  )-----------( 101      ( 14 Aug 2024 10:51 )             28.9      08-14    145  |  107  |  28<H>  ----------------------------<  126<H>  4.1   |  22  |  1.16    Ca    8.1<L>      14 Aug 2024 10:51  Phos  3.6     08-14  Mg     2.0     08-14    TPro  5.2<L>  /  Alb  3.1<L>  /  TBili  0.8  /  DBili  x   /  AST  24  /  ALT  16  /  AlkPhos  189<H>  08-14    PT/INR - ( 13 Aug 2024 10:28 )   PT: 12.3 sec;   INR: 1.12 ratio         PTT - ( 13 Aug 2024 10:28 )  PTT:28.0 sec      Urinalysis Basic - ( 14 Aug 2024 10:51 )    Color: x / Appearance: x / SG: x / pH: x  Gluc: 126 mg/dL / Ketone: x  / Bili: x / Urobili: x   Blood: x / Protein: x / Nitrite: x   Leuk Esterase: x / RBC: x / WBC x   Sq Epi: x / Non Sq Epi: x / Bacteria: x      ADDITIONAL TESTS:    Consultant(s) Notes Reviewed: Heme/Onc, Palliative, Cardiology

## 2024-08-18 NOTE — PROGRESS NOTE ADULT - PROBLEM SELECTOR PLAN 7
Patient with episodes of NSVT throughout admission.    Plan:  - Continue metoprolol 25mg bid  - Replete electrolytes as necessary  - Continue to monitor Likely 2/2 to GI bleeding.  Iron 89 (WNL), UIBC 48 (low), TIBC 138 (low), Sat 65% (high), Ferritin 998 (high)  Vitamin B12 WNL, Haptoglobin WNL    Plan:  - Continue to monitor

## 2024-08-18 NOTE — PROGRESS NOTE ADULT - PROBLEM SELECTOR PLAN 5
Patient with chronic afib unable to be on AC for this and seemingly minimally responsive to amiodarone. Cardiology following, no obvious underlying cause aside from hemorrhage, which is difficult to control.    Plan:  - Dig level 0.6 (low) 8/12. 0.7 (low) 8/16  - Continue digoxin to 125mcg every day  - Check trough in 2 days (not within 6 hours of giving dose); ordered  - Holding eliquis Patient with chronic afib unable to be on AC for this and seemingly minimally responsive to amiodarone. Cardiology following, no obvious underlying cause aside from hemorrhage, which is difficult to control. Rates in 130s on tele.    Plan:  - Dig level 0.6 (low) 8/12. 0.7 (low) 8/16  - Continue digoxin to 125mcg every day  - Increase metoprolol to 25mg q8  - Check trough tomorrow (not within 6 hours of giving dose); ordered  - Holding eliquis

## 2024-08-18 NOTE — PROGRESS NOTE ADULT - ASSESSMENT
79 yo male with PMH of CAD s/p PCI x3 with most recent stent 6/12/24 on Plavix, chronic Afib on eliquis, orthostatic hypotension on midodrine, PAD, neuroendocrine tumor with RT currently on hold, recent admission for dx cath on 6/24/24 who presented for hypotension, admitted for GIB and hemorrhagic shock. Found to be bleeding from duodenum. Transferred from MICU to floors, off pressors.     1. Pancreatic NET- metastatic   -- was on lanreotide then had POD in liver and started on peptide receptor radiotherapy (PRRT)- typically given every 8 weeks for 4 doses. He received one dose on 10/20/2023 and planned to get his 2nd dose at the end of December however his course was complicated by multiple hospitalizations that were noncancer related (decompensated heart failure).   -- 5/28/24 PET Dotatate (compared to 9/2023): several new somatostatin avid osseous lesions, some faintly sclerotic, suspicious for mets. Increased upper RP nodes, probably metastatic. Decreased intensely tracer avid right supradiaphragmatic and upper abdominal nodes, suspicious for metastasis. Redemonstrated intensely somatostatin avid bilobar hepatic lesions, consistent with metastases again morphologically difficult to delineate.   -- CT reviewed by [facility]: SINCE MAY 8 2024 INCREASED HEPATIC METASTASES, NEWLY SEEN PRIMARY TUMOR IN THE PANCREAS, and active bleeding within the duodenum with associated intraluminal hematoma.   -- chromogranin level is elevated at 2058, but no prior level at [facility] for review   -- f/u with Dr. Sophia Prasad at [facility] after discharge, no plan for chemotherapy inpatient, if clinically improves/stabilizes then can be considered for treatment outpatient  --Continue octreotide 250mcg TID  --Goals of care meeting next week as wife would like other family members involved    2. Duodenal bleed, Hemorrhagic shock  - Now transferred to medicine from MICU, s/p extubation 7/22. NG tube in place.  - CT w/ bleed in duodenum. GI called, EGD 7/9 -> S/p  IR embolization 7/9, intubated in MICU -> s/p extubation 7/15 -> intubated 7/18  - s/p multiple transfusions, fibrinogen low would recommend Cryo for < 100, but coags have improved as are essentially normal now so unlikely, probably was related to liver dysfunction.   - s/p repeat EGD 7/12 showing duodenal lesions w/clots and oozing, no clear targets for intervention and no active bleeding, purastat applied to all areas of oozing.   - S/p EGD 7/18: duodenal ulcer with active oozing, ulcer with visible vessel. Bleeding treated.   - s/p multiple transfusions, per GI, high risk for rebleed, will continue PPI.   - Repeat CT AP on w/ No evidence of GI bleed.  Interval endoscopic versus surgical intervention with metallic streak artifact suggestive of surgical clips in the region of the proximal one third portions of the duodenum. Evaluation of the previously seen hematoma is limited secondary to this artifact. Moderate bilateral pleural effusions and associated compressive atelectasis, right greater than left, overall slightly increased from previous CT. Anasarca.  - Per IR, In the absences of any active extravasation on CTA there's no place to target for an embolization  - 7/29 sputum culture + Pseudomonas; RRT on 8/2 due to tachycardia, hypotension; febrile to 101. Currently on cefepime x 7 days  - Continue octreotide 250 mcg TID  - CT AP 8/4: No evidence of active intraluminal extravasation of contrast. No evidence of acute intraperitoneal or retroperitoneal hemorrhage  - Underwent swallow assessment, started mildly thick liquids  - 8/8 CTA a/p d/t reports of melena, H/H stable--> Limited evaluation for active gastrointestinal hemorrhage due to high density oral contrast within the colon and streak artifact from orthopedic hardware, embolization material, and duodenal clips. Question a focus of enhancement in the duodenum adjacent to the duodenal clips versus streak artifact, with active hemorrhage within the duodenum not excluded.  - hb stable.     3. C. diff   - diarrhea resolved, off vanco    Will continue to follow.

## 2024-08-18 NOTE — PROGRESS NOTE ADULT - SUBJECTIVE AND OBJECTIVE BOX
Patient is a 79y old  Male who presents with a chief complaint of hypotension (15 Aug 2024 10:17)    Subjective:  Patient seen at bedside.  Patient is more confused today.  No significant overnight events.     MEDICATIONS  (STANDING):  albumin human 25% IVPB 50 milliLiter(s) IV Intermittent every 8 hours  AQUAPHOR (petrolatum Ointment) 1 Application(s) Topical two times a day  buMETAnide Injectable 2 milliGRAM(s) IV Push <User Schedule>  chlorhexidine 2% Cloths 1 Application(s) Topical <User Schedule>  dextrose 50% Injectable 25 Gram(s) IV Push once  digoxin     Tablet 125 MICROGram(s) Oral every other day  folic acid Injectable 1 milliGRAM(s) IV Push daily  guaiFENesin Oral Liquid (Sugar-Free) 300 milliGRAM(s) Oral every 6 hours  insulin lispro (ADMELOG) corrective regimen sliding scale   SubCutaneous at bedtime  insulin lispro (ADMELOG) corrective regimen sliding scale   SubCutaneous three times a day before meals  metoprolol tartrate 25 milliGRAM(s) Oral two times a day  midodrine 10 milliGRAM(s) Oral every 8 hours  octreotide  Injectable 250 MICROGram(s) IV Push three times a day  pantoprazole  Injectable 40 milliGRAM(s) IV Push every 12 hours  sodium chloride 3%  Inhalation 4 milliLiter(s) Inhalation every 12 hours  thiamine Injectable 100 milliGRAM(s) IV Push daily    MEDICATIONS  (PRN):  ondansetron Injectable 4 milliGRAM(s) IV Push once PRN Nausea and/or Vomiting      ROS  No fever, sweats, chills  No epistaxis, HA, sore throat  No CP, SOB, cough, sputum  No n/v/d, abd pain, melena, hematochezia  No edema  No rash  No anxiety  No back pain, joint pain  No bleeding, bruising  No dysuria, hematuria    Vital Signs Last 24 Hrs  T(C): 37.1 (18 Aug 2024 18:00), Max: 37.2 (18 Aug 2024 11:15)  T(F): 98.7 (18 Aug 2024 18:00), Max: 99 (18 Aug 2024 11:15)  HR: 79 (18 Aug 2024 18:00) (79 - 116)  BP: 109/68 (18 Aug 2024 18:00) (109/62 - 124/81)  BP(mean): --  RR: 19 (18 Aug 2024 18:00) (17 - 19)  SpO2: 94% (18 Aug 2024 18:00) (92% - 96%)    Parameters below as of 18 Aug 2024 18:00  Patient On (Oxygen Delivery Method): nasal cannula  O2 Flow (L/min): 2      PE  NAD  Awake, alert  Anicteric, MMM  RRR  CTAB  Abd soft, NT, ND  No c/c/e  No rash grossly  FROM                                                      9.3    4.65  )-----------( 129      ( 18 Aug 2024 07:11 )             29.8   08-18    148<H>  |  108  |  24<H>  ----------------------------<  133<H>  3.7   |  26  |  0.90    Ca    8.3<L>      18 Aug 2024 07:15  Phos  2.5     08-18  Mg     1.6     08-18    TPro  5.1<L>  /  Alb  2.8<L>  /  TBili  1.0  /  DBili  x   /  AST  27  /  ALT  17  /  AlkPhos  192<H>  08-18

## 2024-08-18 NOTE — PROGRESS NOTE ADULT - PROBLEM SELECTOR PLAN 1
Patient with persistent UGIB with coffee ground emesis and melanotic stools multiple times requiring IR GDA embolization initially and then EGD s/p 3 clips for duodenal ulcer. Patient has been off of his aspirin and plavix for his very recent stent and Eliquis for his chronic afib since he has required so many repeat transfusions due to this active blood loss. The hemorrhagic shock seems to have temporarily resolved. Fibrinogen and PT/INR in normal ranges. CTAP 8/4 and 8/8 showed no strong indications of active bleeding. Hgb currently stable for several days.    Plan:  - Continue midodrine 10mg q8 as necessary  - Protonix BID per GI; consider switching to PO if patient can tolerate pills  - Hold all AC and anti-platelet agents  - Continue CBC q24 unless having active bleeding, transfuse Hgb <8  - Maintain T&S q48 hours (most recent 8/16)  - Can consider iron chelation therapy given many transfusions Patient with persistent UGIB with coffee ground emesis and melanotic stools multiple times requiring IR GDA embolization initially and then EGD s/p 3 clips for duodenal ulcer. Patient has been off of his aspirin and plavix for his very recent stent and Eliquis for his chronic afib since he has required so many repeat transfusions due to this active blood loss. The hemorrhagic shock seems to have temporarily resolved. Fibrinogen and PT/INR in normal ranges. CTAP 8/4 and 8/8 showed no strong indications of active bleeding. Hgb currently stable for several days.    Plan:  - Continue midodrine 10mg q8 as necessary  - Protonix BID per GI; consider switching to PO if patient can tolerate pills  - Hold all AC and anti-platelet agents  - Continue CBC q24 unless having active bleeding, transfuse Hgb <8  - Maintain T&S q48 hours  - Can consider iron chelation therapy given many transfusions

## 2024-08-18 NOTE — PROGRESS NOTE ADULT - SUBJECTIVE AND OBJECTIVE BOX
DATE OF SERVICE: 08-18-24 @ 10:10    Patient is a 79y old  Male who presents with a chief complaint of hypotension (18 Aug 2024 05:12)      INTERVAL HISTORY: doing well     REVIEW OF SYSTEMS:  CONSTITUTIONAL: No weakness  EYES/ENT: No visual changes;  No throat pain   NECK: No pain or stiffness  RESPIRATORY: No cough, wheezing; No shortness of breath  CARDIOVASCULAR: No chest pain or palpitations  GASTROINTESTINAL: No abdominal  pain. No nausea, vomiting, or hematemesis  GENITOURINARY: No dysuria, frequency or hematuria  NEUROLOGICAL: No stroke like symptoms  SKIN: No rashes  	  MEDICATIONS:  buMETAnide Injectable 2 milliGRAM(s) IV Push daily  digoxin     Tablet 125 MICROGram(s) Oral daily  metoprolol tartrate 25 milliGRAM(s) Oral two times a day  midodrine 10 milliGRAM(s) Oral every 8 hours        PHYSICAL EXAM:  T(C): 36.9 (08-18-24 @ 04:47), Max: 36.9 (08-18-24 @ 04:47)  HR: 116 (08-18-24 @ 04:47) (93 - 116)  BP: 117/77 (08-18-24 @ 04:47) (100/62 - 117/77)  RR: 18 (08-18-24 @ 04:47) (18 - 18)  SpO2: 92% (08-18-24 @ 04:47) (92% - 96%)  Wt(kg): --  I&O's Summary    17 Aug 2024 07:01  -  18 Aug 2024 07:00  --------------------------------------------------------  IN: 0 mL / OUT: 1453 mL / NET: -1453 mL          Appearance: In no distress	  HEENT:    PERRL, EOMI	  Cardiovascular:  S1 S2, No JVD  Respiratory: Lungs clear to auscultation	  Gastrointestinal:  Soft, Non-tender, + BS	  Vascularature:  No edema of LE  Psychiatric: Appropriate affect   Neuro: no acute focal deficits                               9.3    4.65  )-----------( 129      ( 18 Aug 2024 07:11 )             29.8     08-18    148<H>  |  108  |  24<H>  ----------------------------<  133<H>  3.7   |  26  |  0.90    Ca    8.3<L>      18 Aug 2024 07:15  Phos  2.5     08-18  Mg     1.6     08-18    TPro  5.1<L>  /  Alb  2.8<L>  /  TBili  1.0  /  DBili  x   /  AST  27  /  ALT  17  /  AlkPhos  192<H>  08-18        Labs personally reviewed      ASSESSMENT/PLAN: 	  78-year-old male multiple medical problems history of hepatocellular carcinoma status post radiation therapy, AF s/p DCCV on Eliquis who was found to be hypotensive following NST. Patient has reported general weakness, fatigue, lightheadedness since being discharged from hospital. Reports mild chest discomfort in midsternal region. Reports dyspnea with minimal exertion. Also reports significant lack of appetite and poor PO intake. No dark or bloody stool, nausea or vomiting.       Problem/Plan - 1:  ·  Problem: Hypotension  ·  Plan: s/p pressors in MICU.  Now transferred to [facility]  - Patient presents with hypotension and also RAZIA. Has known orthostatic hypotension.   - Patient and wife report decreased PO intake likely 2/2 malignancy.  - GIB 7/9, s/p multiple units prbc, IR for mesenteric embolization; Repeat CT A/P with no extravazation  - c/w Midodrine 10mg PO [id number]  - CT A/P inconclusive     Problem/Plan - 2:  ·  Problem: CAD.   ·  Plan: Recent PCI to pLAD in June 2023  - ECG non-ischemic  - Plavix held given large melena; ideally should be on Plavix but high risk to resume      Problem/Plan - 3:  ·  Problem: Atrial Fibrillation  - s/p succesful DCCV 10/31  - Hold Eliquis 5mg BID given GIB  - s/p Amio  - c/w Dig 125mcg PO QOD--> dig level low. Can consider increasing frequency to daily  - Metoprolol 25mg BID     Problem/Plan - 4:  ·  Problem: Acute Hypoxic Respiratory Failure  - Now requiring 3L NC  - CT Chest shows B/L pleural effusion and atelectasis  - BNP 12K  - s/p Lasix 40mg IV once with albumin 8/9  - s/p IV administration of albumin 8/12  - Cont with Bumex 2mg IV daily; Transition to 2mg PO daily when no longer hypoxic     Problem/Plan - 5:  ·  Problem: Electrolyte imbalance   - treat all imbalances lytes  - repeat BMP     Dr. Hayes will be away August 19th-22nd. Dr. Orville Hoover will be covering.         ESTEFANI Velez, DO [facility]  Cardiovascular Medicine  [address] [place] [address], [address]  Available through call or text on [employer]  Office: [phone]

## 2024-08-18 NOTE — CHART NOTE - NSCHARTNOTEFT_GEN_A_CORE
Called Yamilet (patient's wife) and provided medical update. Clarified that palliative, medicine and potentially oncology may be a part of the family meeting. Answered all questions.

## 2024-08-18 NOTE — PROGRESS NOTE ADULT - PROBLEM SELECTOR PLAN 6
Likely 2/2 to GI bleeding.  Iron 89 (WNL), UIBC 48 (low), TIBC 138 (low), Sat 65% (high), Ferritin 998 (high)  Vitamin B12 WNL, Haptoglobin WNL    Plan:  - Continue to monitor Patient with episodes of NSVT throughout admission.    Plan:  - Increase metoprolol to 25mg q8  - Replete electrolytes as necessary  - Continue to monitor

## 2024-08-18 NOTE — PROGRESS NOTE ADULT - ATTENDING COMMENTS
Jaye Allen MD  Division of Hospital Medicine  [facility]   Available on Microsoft Teams - messages preferred prior to calls.    Patient seen and examined today with Team 5 Resident and Intern. Agree with above findings, assessment, and plan with the following additions/exceptions:    80 yo male with PMH of of metastatic neuroendocrine pancreatic cancer, hx of CAD s/p PCI x3 with stents placed on 6/12/24, chronic afib, orthostatic hypotension on midodrine, and PAD who presented with hypotension found to have UGIB c/b hemorrhagic shock requiring MICU for pressor support with course complicated by C Diff, afib RVR, and RAZIA iso hypotension. Transferred to floors with intermittent melena and drop in Hb but overall stable with no acute surgical nor GI intervention recommended. GOC discussions ongoing.    Plan:  - Hb remains stable. last PRBC transfusion was 8/13  - c/w PPI IV BID, continue to hold eliquis given GI bleed  - anasarca continues to improve though with intravascular depletion, worsening hypernatreima and poor ability to maintain adequate PO free water intake (on thickened liquids)  - will give diuretic holiday - last dose was 8/18. d/w Dr. Hayes who is in agreement  - c/w midodrine 10mg TID to augment BP to allow for adequate diuresis as needed  - increase metoprolol to 25mg TID for afib RVR noted on tele  - digoxin increased to daily dosing as per Cards recs due to low level. re-check level in AM  - chest vest to help clear secretions  - Palliative care consult appreciated. family meeting scheduled for Tues 8/21 @ 3pm    Dispo: GAIL pending further GOC    Rest as detailed in note above.    Plan discussed with patient, Cardiology Attending Dr. Hayes, and Team 5 Intern Dr. Okeefe. Jaye Allen MD  Division of Hospital Medicine  [facility]   Available on Microsoft Teams - messages preferred prior to calls.    Patient seen and examined today with Team 5 Resident and Intern. Agree with above findings, assessment, and plan with the following additions/exceptions:    80 yo male with PMH of of metastatic neuroendocrine pancreatic cancer, hx of CAD s/p PCI x3 with stents placed on 6/12/24, chronic afib, orthostatic hypotension on midodrine, and PAD who presented with hypotension found to have UGIB c/b hemorrhagic shock requiring MICU for pressor support with course complicated by C Diff, afib RVR, and RAZIA iso hypotension. Transferred to floors with intermittent melena and drop in Hb but overall stable with no acute surgical nor GI intervention recommended. GOC discussions ongoing.    Plan:  - Hb remains stable. last PRBC transfusion was 8/13  - c/w PPI IV BID, continue to hold eliquis given GI bleed  - anasarca continues to improve though with intravascular depletion, worsening hypernatremia and poor ability to maintain adequate PO free water intake (on thickened liquids)  - will give diuretic holiday - last dose was 8/18. d/w Dr. Hayes who is in agreement  - c/w midodrine 10mg TID to augment BP to allow for adequate diuresis as needed  - increase metoprolol to 25mg TID for afib RVR noted on tele  - digoxin increased to daily dosing as per Cards recs due to low level. re-check level in AM  - chest vest to help clear secretions  - Palliative care consult appreciated. family meeting scheduled for Tues 8/21 @ 3pm    Dispo: GAIL pending further GOC    Rest as detailed in note above.    Plan discussed with patient, Cardiology Attending Dr. Hayes, and Team 5 Intern Dr. Okeefe.

## 2024-08-18 NOTE — PROGRESS NOTE ADULT - PROBLEM SELECTOR PLAN 4
Patient with failure to thrive iso metastatic disease as well as repeated intubation and difficulty swallowing.  MBS 8/6 showing no gross aspiration, but still high-risk. Recommended pureed diet with mildly thickened liquids and aspiration precautions given patient and family desire PO diet. NG tube removed 8/6 after study.    Plan:  - Continue diet, to be supplemented with RD recs  - Start mirtazepine 7.5mg qhs for appetite stimulation, uptitrate as necessary  - Monitor electrolytes as high risk for refeeding syndrome  - GOC meeting with family (wife, daughter) and palliative last week; family meeting this Tuesday 3pm 8/20; wife wonders if heme/onc can be involved in this meeting either in person or over the phone  - Plan for out of bed into chair once recliner brought into room

## 2024-08-18 NOTE — PROGRESS NOTE ADULT - PROBLEM SELECTOR PLAN 2
Patient's profound edema likely 2/2 large volume in during this admission as well as malnutrition. Previous POCUS have not identified a cardiac cause. Patient currently with cough. XR today with similar bilateral pleural effusions. Albumin improved to 3.1 8/14. Patient with appropriate output and clinically improving.    Plan:  - Hold albumin given improvement in labs  - Give Bumex 2mg IV only daily given increase in SCr since admission  - Strict I/Os Patient's profound edema likely 2/2 large volume in during this admission as well as malnutrition. Previous POCUS have not identified a cardiac cause. Patient currently with cough. XR today with similar bilateral pleural effusions. Albumin improved to 3.1 8/14. Patient with appropriate output and clinically improving.    Plan:  - Hold albumin given improvement in labs  - Give Bumex 2mg IV only daily given increase in SCr since admission; give diuretic holiday tomorrow given hypernatremia  - Strict I/Os

## 2024-08-19 NOTE — PROVIDER CONTACT NOTE (SEPSIS SCREENING) - NOTIFICATION DETAILS (SBAR) SITUATION:
Tachycardia and hypotension 
Pt temp was taken rectally due to pt being in discomfort and shivering- temp was 103
patient A&Ox0-1, , BP 91/56, patient has fevers las night up to 101.4

## 2024-08-19 NOTE — PROGRESS NOTE ADULT - SUBJECTIVE AND OBJECTIVE BOX
Patient is a 79y old  Male who presents with a chief complaint of hypotension (19 Aug 2024 10:00)    Patient seen and examined at bedside, resting comfortably. Aide at bedside, reports he briefly woke up earlier. Overnight found to be tachy to 130s and febrile, antibiotics started.    MEDICATIONS  (STANDING):  AQUAPHOR (petrolatum Ointment) 1 Application(s) Topical two times a day  chlorhexidine 2% Cloths 1 Application(s) Topical <User Schedule>  dextrose 50% Injectable 25 Gram(s) IV Push once  folic acid Injectable 1 milliGRAM(s) IV Push daily  insulin lispro (ADMELOG) corrective regimen sliding scale   SubCutaneous three times a day before meals  insulin lispro (ADMELOG) corrective regimen sliding scale   SubCutaneous at bedtime  metoprolol tartrate Injectable 5 milliGRAM(s) IV Push every 6 hours  midodrine 10 milliGRAM(s) Oral every 8 hours  mirtazapine 7.5 milliGRAM(s) Oral at bedtime  octreotide  Injectable 250 MICROGram(s) IV Push three times a day  pantoprazole  Injectable 40 milliGRAM(s) IV Push every 12 hours  piperacillin/tazobactam IVPB.- 3.375 Gram(s) IV Intermittent once  piperacillin/tazobactam IVPB.. 3.375 Gram(s) IV Intermittent every 8 hours  sodium chloride 0.9%. 1000 milliLiter(s) (75 mL/Hr) IV Continuous <Continuous>  sodium chloride 3%  Inhalation 4 milliLiter(s) Inhalation every 12 hours  thiamine Injectable 100 milliGRAM(s) IV Push daily    MEDICATIONS  (PRN):  ondansetron Injectable 4 milliGRAM(s) IV Push once PRN Nausea and/or Vomiting    Vital Signs Last 24 Hrs  T(C): 36.9 (19 Aug 2024 10:32), Max: 39.4 (18 Aug 2024 22:00)  T(F): 98.5 (19 Aug 2024 10:32), Max: 103 (18 Aug 2024 22:00)  HR: 114 (19 Aug 2024 10:32) (79 - 137)  BP: 91/56 (19 Aug 2024 10:32) (91/56 - 124/81)  BP(mean): --  RR: 18 (19 Aug 2024 10:32) (17 - 19)  SpO2: 92% (19 Aug 2024 10:32) (90% - 96%)    Parameters below as of 19 Aug 2024 10:32  Patient On (Oxygen Delivery Method): room air    PE  NAD  Resting comfortably  MMM  Abd soft  B/l LE edema, both legs wrapped                        8.9    7.31  )-----------( 111      ( 19 Aug 2024 06:29 )             29.6     08-19    146<H>  |  106  |  25<H>  ----------------------------<  180<H>  4.0   |  25  |  1.06    Ca    7.6<L>      19 Aug 2024 06:29  Phos  4.0     08-19  Mg     1.6     08-19    TPro  5.1<L>  /  Alb  2.8<L>  /  TBili  1.0  /  DBili  x   /  AST  27  /  ALT  17  /  AlkPhos  192<H>  08-18

## 2024-08-19 NOTE — PROGRESS NOTE ADULT - PROBLEM SELECTOR PLAN 1
Sepsis likely 2/2 aspiration PNA, given clinical presentation of poor secretion clearance, toxic metabolic encephalopathy, wet gurgly cough    -c/w empiric Zosyn and PO Vanco given CDiff history  -f/u blood cultures and MRSA swab   -f/u urine studies  -CXR without clear consolidation  -NPO  -GOC pending tomorrow with family Sepsis likely 2/2 aspiration PNA, given clinical presentation of poor secretion clearance, toxic metabolic encephalopathy, wet gurgly cough    -c/w empiric Zosyn and PO Vanco given CDiff history  -LR 500cc bolus x1, re-assess   -f/u blood cultures and MRSA swab   -f/u urine studies  -CXR without clear consolidation  -NPO. Hold off on NGT as he is encephalopathic-concern that he would not be cooperative   -GOC pending tomorrow with family  -d/w daughter

## 2024-08-19 NOTE — PROGRESS NOTE ADULT - SUBJECTIVE AND OBJECTIVE BOX
DATE OF SERVICE: 08-19-24 @ 19:27    Patient is a 79y old  Male who presents with a chief complaint of hypotension (19 Aug 2024 11:57)      INTERVAL HISTORY: s/p Afib RVR today on nonrebreather    REVIEW OF SYSTEMS:  CONSTITUTIONAL: No weakness  EYES/ENT: No visual changes;  No throat pain   NECK: No pain or stiffness  RESPIRATORY: No cough, wheezing; No shortness of breath  CARDIOVASCULAR: No chest pain or palpitations  GASTROINTESTINAL: No abdominal  pain. No nausea, vomiting, or hematemesis  GENITOURINARY: No dysuria, frequency or hematuria  NEUROLOGICAL: No stroke like symptoms  SKIN: No rashes    TELEMETRY Personally reviewed: Afib 100s-130s  	  MEDICATIONS:  metoprolol tartrate Injectable 5 milliGRAM(s) IV Push every 6 hours  midodrine 10 milliGRAM(s) Oral every 8 hours        PHYSICAL EXAM:  T(C): 37.7 (08-19-24 @ 17:50), Max: 39.4 (08-18-24 @ 22:00)  HR: 100 (08-19-24 @ 17:50) (70 - 137)  BP: 105/68 (08-19-24 @ 17:50) (77/51 - 144/68)  RR: 18 (08-19-24 @ 17:50) (18 - 18)  SpO2: 99% (08-19-24 @ 17:50) (90% - 99%)  Wt(kg): --  I&O's Summary    18 Aug 2024 07:01  -  19 Aug 2024 07:00  --------------------------------------------------------  IN: 120 mL / OUT: 0 mL / NET: 120 mL    19 Aug 2024 07:01  -  19 Aug 2024 19:27  --------------------------------------------------------  IN: 0 mL / OUT: 0 mL / NET: 0 mL          Appearance: In no distress	  HEENT:    PERRL, EOMI	  Cardiovascular:  S1 S2, No JVD  Respiratory: Lungs clear to auscultation	  Gastrointestinal:  Soft, Non-tender, + BS	  Vascularature:  No edema of LE  Psychiatric: Appropriate affect   Neuro: no acute focal deficits                               8.9    7.31  )-----------( 111      ( 19 Aug 2024 06:29 )             29.6     08-19    146<H>  |  106  |  25<H>  ----------------------------<  180<H>  4.0   |  25  |  1.06    Ca    7.6<L>      19 Aug 2024 06:29  Phos  4.0     08-19  Mg     1.6     08-19    TPro  5.1<L>  /  Alb  2.8<L>  /  TBili  1.0  /  DBili  x   /  AST  27  /  ALT  17  /  AlkPhos  192<H>  08-18        Labs personally reviewed      ASSESSMENT/PLAN: 	    78-year-old male multiple medical problems history of hepatocellular carcinoma status post radiation therapy, AF s/p DCCV on Eliquis who was found to be hypotensive following NST. Patient has reported general weakness, fatigue, lightheadedness since being discharged from hospital. Reports mild chest discomfort in midsternal region. Reports dyspnea with minimal exertion. Also reports significant lack of appetite and poor PO intake. No dark or bloody stool, nausea or vomiting.       Problem/Plan - 1:  ·  Problem: Hypotension  ·  Plan: s/p pressors in MICU.  Now transferred to [facility]  - Patient presents with hypotension and also RAZIA. Has known orthostatic hypotension.   - Patient and wife report decreased PO intake likely 2/2 malignancy.  - GIB 7/9, s/p multiple units prbc, IR for mesenteric embolization; Repeat CT A/P with no extravazation  - c/w Midodrine 10mg PO q2aryrh  - CT A/P inconclusive     Problem/Plan - 2:  ·  Problem: CAD.   ·  Plan: Recent PCI to pLAD in June 2023  - ECG non-ischemic  - Plavix held given large melena; ideally should be on Plavix but high risk to resume      Problem/Plan - 3:  ·  Problem: Atrial Fibrillation  - s/p succesful DCCV 10/31  - Hold Eliquis 5mg BID given GIB  - s/p Amio  - c/w Dig 125mcg PO QOD--> dig level low. Can consider increasing frequency to daily  - Metoprolol 25mg BID  - 8/19 Afib with RVR and fevers. IV dig x 1 given     Problem/Plan - 4:  ·  Problem: Acute Hypoxic Respiratory Failure  - Now requiring 3L NC  - CT Chest shows B/L pleural effusion and atelectasis  - BNP 12K  - s/p Lasix 40mg IV once with albumin 8/9  - s/p IV administration of albumin 8/12  - Cont with Bumex 2mg IV daily; Transition to 2mg PO daily when no longer hypoxic          Iolani Behrbom, AG-NP   Hank Hayes DO [facility]  Cardiovascular Medicine  [address] [place] [address], [address]  Available through call or text on [employer] TEAM[employer]  Office: [phone]   DATE OF SERVICE: 08-19-24 @ 19:27    Patient is a 79y old  Male who presents with a chief complaint of hypotension (19 Aug 2024 11:57)      INTERVAL HISTORY: s/p Afib RVR today on nonrebreather    REVIEW OF SYSTEMS:  CONSTITUTIONAL: No weakness  EYES/ENT: No visual changes;  No throat pain   NECK: No pain or stiffness  RESPIRATORY: No cough, wheezing; No shortness of breath  CARDIOVASCULAR: No chest pain or palpitations  GASTROINTESTINAL: No abdominal  pain. No nausea, vomiting, or hematemesis  GENITOURINARY: No dysuria, frequency or hematuria  NEUROLOGICAL: No stroke like symptoms  SKIN: No rashes    TELEMETRY Personally reviewed: Afib 100s-130s  	  MEDICATIONS:  metoprolol tartrate Injectable 5 milliGRAM(s) IV Push every 6 hours  midodrine 10 milliGRAM(s) Oral every 8 hours        PHYSICAL EXAM:  T(C): 37.7 (08-19-24 @ 17:50), Max: 39.4 (08-18-24 @ 22:00)  HR: 100 (08-19-24 @ 17:50) (70 - 137)  BP: 105/68 (08-19-24 @ 17:50) (77/51 - 144/68)  RR: 18 (08-19-24 @ 17:50) (18 - 18)  SpO2: 99% (08-19-24 @ 17:50) (90% - 99%)  Wt(kg): --  I&O's Summary    18 Aug 2024 07:01  -  19 Aug 2024 07:00  --------------------------------------------------------  IN: 120 mL / OUT: 0 mL / NET: 120 mL    19 Aug 2024 07:01  -  19 Aug 2024 19:27  --------------------------------------------------------  IN: 0 mL / OUT: 0 mL / NET: 0 mL          Appearance: In no distress	  HEENT:    PERRL, EOMI	  Cardiovascular:  S1 S2, No JVD  Respiratory: Lungs clear to auscultation	  Gastrointestinal:  Soft, Non-tender, + BS	  Vascularature:  No edema of LE  Psychiatric: Appropriate affect   Neuro: no acute focal deficits                               8.9    7.31  )-----------( 111      ( 19 Aug 2024 06:29 )             29.6     08-19    146<H>  |  106  |  25<H>  ----------------------------<  180<H>  4.0   |  25  |  1.06    Ca    7.6<L>      19 Aug 2024 06:29  Phos  4.0     08-19  Mg     1.6     08-19    TPro  5.1<L>  /  Alb  2.8<L>  /  TBili  1.0  /  DBili  x   /  AST  27  /  ALT  17  /  AlkPhos  192<H>  08-18        Labs personally reviewed      ASSESSMENT/PLAN: 	    78-year-old male multiple medical problems history of hepatocellular carcinoma status post radiation therapy, AF s/p DCCV on Eliquis who was found to be hypotensive following NST. Patient has reported general weakness, fatigue, lightheadedness since being discharged from hospital. Reports mild chest discomfort in midsternal region. Reports dyspnea with minimal exertion. Also reports significant lack of appetite and poor PO intake. No dark or bloody stool, nausea or vomiting.       Problem/Plan - 1:  ·  Problem: Hypotension  ·  Plan: s/p pressors in MICU.  Now transferred to [facility]  - Patient presents with hypotension and also RAZIA. Has known orthostatic hypotension.   - Patient and wife report decreased PO intake likely 2/2 malignancy.  - GIB 7/9, s/p multiple units prbc, IR for mesenteric embolization; Repeat CT A/P with no extravazation  - c/w Midodrine 10mg PO [id number]  - CT A/P inconclusive     Problem/Plan - 2:  ·  Problem: CAD.   ·  Plan: Recent PCI to pLAD in June 2023  - ECG non-ischemic  - Plavix held given large melena; ideally should be on Plavix but high risk to resume      Problem/Plan - 3:  ·  Problem: Atrial Fibrillation  - s/p succesful DCCV 10/31  - Hold Eliquis 5mg BID given GIB  - s/p Amio  - c/w Dig 125mcg PO QOD--> dig level low. Can consider increasing frequency to daily  - Metoprolol 25mg BID  - 8/19 Afib with RVR and fevers. IV dig x 1 given     Problem/Plan - 4:  ·  Problem: Acute Hypoxic Respiratory Failure  - Now requiring 3L NC  - CT Chest shows B/L pleural effusion and atelectasis  - BNP 12K  - s/p Lasix 40mg IV once with albumin 8/9  - s/p IV administration of albumin 8/12  - Cont with Bumex 2mg IV daily; Transition to 2mg PO daily when no longer hypoxic      Dr Orville Mock will be covering for Dr. Hayes 8/19-8/22.      Iolani Behrbom, AG-NP   Hank Hayes DO [facility]  Cardiovascular Medicine  [address] [place] [address], [address]  Available through call or text on [employer] TEAMs  Office: [phone]   PROVIDER:[TOKEN:[[id number]],FOLLOWUP:[1-3 days]]

## 2024-08-19 NOTE — PROGRESS NOTE ADULT - SUBJECTIVE AND OBJECTIVE BOX
Medicine Progress Note  --------------------  Christal Connolly M.D.  Internal Medicine  PGY-1  --------------------      Patient: NANDO HERNANDEZ, MRN: [id number], : 1945  Admitted on 24 for Acute renal failure      LANGUAGE - English            ------------------------------------------------------------------------------------------------------------  SUMMARY (from last progress note through 24 @ 10:00):   ------------------------------------------------------------------------------------------------------------  OVERNIGHT EVENTS  RRT overnight for fever, Afib with RVR to 150s. Meeting criteria for sepsis. Started on zosyn, pan cultured. Likely aspiration PNA given mental status.     SUBJECTIVE  - Unable to obtain due to encephalopathy      ROS negative unless noted above.  ------------------------------------------------------------------------------------------------------------  OBJECTIVE:  Physical Exam  CONST:     NAD, well-appearing; well-developed; appears stated age  EYES:         Conjunctiva clear; PERRL; no conjunctival pallor; no lid lag  ENMT:       MMM, no pharyngeal injection or exudates; normal dentition/dentures present  NECK:        Supple, no LAD; no palpable masses; no thyromegaly  RESP :        Normal respiratory effort; CTA bilaterally; no W/R/R  CHEST:       No TTP, no lines/ports; symmetric chest expansion  CARDIO:     RRR, normal S1 and S2, no M/R/G; apical impulse at MCL  VASC:         No JVD/AJR, no peripheral edema, pulses 2+ B/L, Cap refill <2s  ABD:           Soft, NT/ND, norm bowel sounds; no R/G; No HSM; No CVAT  MSK:          No clubbing of digits; no joint swelling or TTP  EXT:           WWP, no reduction in body hair, no LE skin changes  PSYCH        A&O to person, place, and time; affect appropriate  NEURO:     Non-focal; no gross sensory deficits; moving all extremities   SKIN:          No rashes; no palpable lesions; axillae not dry    Vital Signs Last 24 Hrs  T(F): 97, Max: 103 (24 @ 22:00)  HR: 137 (79 - 137)  BP: 98/62 (96/63 - 124/81)  RR: 18 (17 - 19)  SpO2: 96% (90% - 96%)        DAILY MEASUREMENTS:  I&O's Summary    18 Aug 2024 07:01  -  19 Aug 2024 07:00  --------------------------------------------------------  IN: 120 mL / OUT: 0 mL / NET: 120 mL      Daily     Daily Weight in k (19 Aug 2024 09:04)    Orthostatic VS        LABS:                        8.9    7.31  )-----------( 111      ( 19 Aug 2024 06:29 )             29.6     Hgb Trend: 8.9<--, 8.9<--, 9.3<--, 9.2<--, 9.2<--      146<H>  |  106  |  25<H>  ----------------------------<  180<H>  4.0   |  25  |  1.06    Ca    7.6<L>      19 Aug 2024 06:29  Phos  4.0       Mg     1.6         TPro  5.1<L>  /  Alb  2.8<L>  /  TBili  1.0  /  DBili  x   /  AST  27  /  ALT  17  /  AlkPhos  192<H>        CAPILLARY BLOOD GLUCOSE      POCT Blood Glucose.: 182 mg/dL (19 Aug 2024 08:25)  POCT Blood Glucose.: 171 mg/dL (18 Aug 2024 22:10)  POCT Blood Glucose.: 151 mg/dL (18 Aug 2024 16:57)  POCT Blood Glucose.: 132 mg/dL (18 Aug 2024 11:31)        Urinalysis Basic - ( 19 Aug 2024 06:29 )    Color: x / Appearance: x / SG: x / pH: x  Gluc: 180 mg/dL / Ketone: x  / Bili: x / Urobili: x   Blood: x / Protein: x / Nitrite: x   Leuk Esterase: x / RBC: x / WBC x   Sq Epi: x / Non Sq Epi: x / Bacteria: x        Arterial Blood Gas:  24 @ 22:35 on FiO2 --  7.52/35/88/29/98.8/5.5  ABG lactate: --        RADIOLOGY & ADDITIONAL TESTS:  Results Reviewed: Bilateral pleural effusions, improved from prior. No obvious consolidation      ------------------------------------------------------------------------------------------------------------  MEDICATIONS  (STANDING):  AQUAPHOR (petrolatum Ointment) 1 Application(s) Topical two times a day  chlorhexidine 2% Cloths 1 Application(s) Topical <User Schedule>  dextrose 50% Injectable 25 Gram(s) IV Push once  folic acid Injectable 1 milliGRAM(s) IV Push daily  insulin lispro (ADMELOG) corrective regimen sliding scale   SubCutaneous at bedtime  insulin lispro (ADMELOG) corrective regimen sliding scale   SubCutaneous three times a day before meals  lactated ringers Bolus 500 milliLiter(s) IV Bolus once  magnesium sulfate  IVPB 2 Gram(s) IV Intermittent once  metoprolol tartrate Injectable 5 milliGRAM(s) IV Push every 6 hours  midodrine 10 milliGRAM(s) Oral every 8 hours  mirtazapine 7.5 milliGRAM(s) Oral at bedtime  octreotide  Injectable 250 MICROGram(s) IV Push three times a day  pantoprazole  Injectable 40 milliGRAM(s) IV Push every 12 hours  piperacillin/tazobactam IVPB.- 3.375 Gram(s) IV Intermittent once  piperacillin/tazobactam IVPB.- 3.375 Gram(s) IV Intermittent once  piperacillin/tazobactam IVPB.. 3.375 Gram(s) IV Intermittent every 8 hours  sodium chloride 0.9%. 1000 milliLiter(s) (75 mL/Hr) IV Continuous <Continuous>  sodium chloride 3%  Inhalation 4 milliLiter(s) Inhalation every 12 hours  thiamine Injectable 100 milliGRAM(s) IV Push daily    MEDICATIONS  (PRN):  ondansetron Injectable 4 milliGRAM(s) IV Push once PRN Nausea and/or Vomiting    ------------------------------------------------------------------------------------------------------------  COORDINATION OF CARE:  Care discussed with consultants/other providers and notes reviewed [Y]     Medicine Progress Note  --------------------  Christal Connolly M.D.  Internal Medicine  PGY-1  --------------------      Patient: NANDO HERNANDEZ, MRN: [id number], : 1945  Admitted on 24 for Acute renal failure      LANGUAGE - English            ------------------------------------------------------------------------------------------------------------  SUMMARY (from last progress note through 24 @ 10:00):   ------------------------------------------------------------------------------------------------------------  OVERNIGHT EVENTS  RRT overnight for fever, Afib with RVR to 150s. Meeting criteria for sepsis. Started on zosyn, pan cultured. Likely aspiration PNA given mental status.     SUBJECTIVE  - Unable to obtain due to encephalopathy      ROS negative unless noted above.  ------------------------------------------------------------------------------------------------------------  OBJECTIVE:  Physical Exam  CONST:     Chronically ill appearing  EYES:         Conjunctiva clear; PERRL; no conjunctival pallor; no lid lag  ENMT:       MMM, no pharyngeal injection or exudates; normal dentition/dentures present  NECK:        Supple, no LAD; no palpable masses; no thyromegaly  RESP :        Diffuse rhonchi   CHEST:       No TTP, no lines/ports; symmetric chest expansion  CARDIO:     RRR, normal S1 and S2, no M/R/G; apical impulse at MCL  VASC:         No JVD/AJR, no peripheral edema, pulses 2+ B/L, Cap refill <2s  ABD:           Soft, NT/ND, norm bowel sounds; no R/G; No HSM; No CVAT  MSK:          No clubbing of digits; no joint swelling or TTP  EXT:           +3 pitting edema bilaterally   PSYCH        A&O x0, confused, combative with suctioning   NEURO:     Non-focal; no gross sensory deficits; moving all extremities   SKIN:          No rashes; no palpable lesions; axillae not dry    Vital Signs Last 24 Hrs  T(F): 97, Max: 103 (- @ 22:00)  HR: 137 (79 - 137)  BP: 98/62 (96/63 - 124/81)  RR: 18 (17 - 19)  SpO2: 96% (90% - 96%)        DAILY MEASUREMENTS:  I&O's Summary    18 Aug 2024 07:01  -  19 Aug 2024 07:00  --------------------------------------------------------  IN: 120 mL / OUT: 0 mL / NET: 120 mL      Daily     Daily Weight in k (19 Aug 2024 09:04)    Orthostatic VS        LABS:                        8.9    7.31  )-----------( 111      ( 19 Aug 2024 06:29 )             29.6     Hgb Trend: 8.9<--, 8.9<--, 9.3<--, 9.2<--, 9.2<--  08-    146<H>  |  106  |  25<H>  ----------------------------<  180<H>  4.0   |  25  |  1.06    Ca    7.6<L>      19 Aug 2024 06:29  Phos  4.0       Mg     1.6         TPro  5.1<L>  /  Alb  2.8<L>  /  TBili  1.0  /  DBili  x   /  AST  27  /  ALT  17  /  AlkPhos  192<H>        CAPILLARY BLOOD GLUCOSE      POCT Blood Glucose.: 182 mg/dL (19 Aug 2024 08:25)  POCT Blood Glucose.: 171 mg/dL (18 Aug 2024 22:10)  POCT Blood Glucose.: 151 mg/dL (18 Aug 2024 16:57)  POCT Blood Glucose.: 132 mg/dL (18 Aug 2024 11:31)        Urinalysis Basic - ( 19 Aug 2024 06:29 )    Color: x / Appearance: x / SG: x / pH: x  Gluc: 180 mg/dL / Ketone: x  / Bili: x / Urobili: x   Blood: x / Protein: x / Nitrite: x   Leuk Esterase: x / RBC: x / WBC x   Sq Epi: x / Non Sq Epi: x / Bacteria: x        Arterial Blood Gas:  24 @ 22:35 on FiO2 --  7.52/35/88/29/98.8/5.5  ABG lactate: --        RADIOLOGY & ADDITIONAL TESTS:  Results Reviewed: Bilateral pleural effusions, improved from prior. No obvious consolidation      ------------------------------------------------------------------------------------------------------------  MEDICATIONS  (STANDING):  AQUAPHOR (petrolatum Ointment) 1 Application(s) Topical two times a day  chlorhexidine 2% Cloths 1 Application(s) Topical <User Schedule>  dextrose 50% Injectable 25 Gram(s) IV Push once  folic acid Injectable 1 milliGRAM(s) IV Push daily  insulin lispro (ADMELOG) corrective regimen sliding scale   SubCutaneous at bedtime  insulin lispro (ADMELOG) corrective regimen sliding scale   SubCutaneous three times a day before meals  lactated ringers Bolus 500 milliLiter(s) IV Bolus once  magnesium sulfate  IVPB 2 Gram(s) IV Intermittent once  metoprolol tartrate Injectable 5 milliGRAM(s) IV Push every 6 hours  midodrine 10 milliGRAM(s) Oral every 8 hours  mirtazapine 7.5 milliGRAM(s) Oral at bedtime  octreotide  Injectable 250 MICROGram(s) IV Push three times a day  pantoprazole  Injectable 40 milliGRAM(s) IV Push every 12 hours  piperacillin/tazobactam IVPB.- 3.375 Gram(s) IV Intermittent once  piperacillin/tazobactam IVPB.- 3.375 Gram(s) IV Intermittent once  piperacillin/tazobactam IVPB.. 3.375 Gram(s) IV Intermittent every 8 hours  sodium chloride 0.9%. 1000 milliLiter(s) (75 mL/Hr) IV Continuous <Continuous>  sodium chloride 3%  Inhalation 4 milliLiter(s) Inhalation every 12 hours  thiamine Injectable 100 milliGRAM(s) IV Push daily    MEDICATIONS  (PRN):  ondansetron Injectable 4 milliGRAM(s) IV Push once PRN Nausea and/or Vomiting    ------------------------------------------------------------------------------------------------------------  COORDINATION OF CARE:  Care discussed with consultants/other providers and notes reviewed [Y]

## 2024-08-19 NOTE — PROGRESS NOTE ADULT - PROBLEM SELECTOR PLAN 4
Patient's profound edema likely 2/2 large volume in during this admission as well as malnutrition. Previous POCUS have not identified a cardiac cause. Patient currently with cough. XR today with similar bilateral pleural effusions. Albumin improved to 3.1 8/14. Patient with appropriate output and clinically improving.    Plan:  - Hold albumin given improvement in labs  - Give Bumex 2mg IV only daily given increase in SCr since admission; give diuretic holiday tomorrow given hypernatremia  - Strict I/Os Small L apical PTX noted on CXR 8/19, unclear etiology. No recent procedures  -CT Chest May 2024 did not show pulmonary blebs or emphysema   -Repeat CXR in AM for stability  -100% NRB to aide in reabsorption   -No indication for chest tube

## 2024-08-19 NOTE — PROGRESS NOTE ADULT - PROBLEM SELECTOR PLAN 8
Likely 2/2 to GI bleeding.  Iron 89 (WNL), UIBC 48 (low), TIBC 138 (low), Sat 65% (high), Ferritin 998 (high)  Vitamin B12 WNL, Haptoglobin WNL    Plan:  - Continue to monitor Patient with known pancreatic neuroendocrine tumor and was on lanreotide. Was later on radiotherapy but due to multiple hospitalizations, was unable to receive follow-up doses. Patient did have PET done in May that showed lesions suspicious for mets. CT also in May showed increased hepatic metastases.     Plan:  - Continue octreotide 250mcg TID

## 2024-08-19 NOTE — PROGRESS NOTE ADULT - ATTENDING COMMENTS
78 yo M PMH metastatic neuroendocrine pancreatic cancer, hx of CAD s/p PCI x3 with stents placed on 6/12/24, chronic afib, orthostatic hypotension on midodrine, and PAD who presented 7/2 with hypotension found to have UGIB c/b hemorrhagic shock requiring MICU for pressor support with course complicated by C Diff, afib RVR, and RAZIA iso hypotension, transferred to floors with intermittent melena and drop in Hb with no acute surgical nor GI intervention recommended, now with sepsis concern for aspiration pna vs abdominal source on empiric zosyn, guarded prognosis, GOC discussions ongoing family meeting planned for tomorrow.    1. Severe Sepsis: fevers 8/18 and now bandemia 14%!  RVP negative.  CXR appears improved from prior.  Tachy/hypotensive.  S/p 250 albumin x 2 overnight.  Received 500ml LR today, plan to repeat.  Suspect 2/2 aspiration PNA, given clinical presentation of poor secretion clearance, toxic metabolic encephalopathy, wet gurgly cough.  However also has abdominal pain, with bladder scan showing 200cc.  Did have UTI earlier during admission and completed course with cefepime.  -check abdominal xray to r/o free air  -c/w empiric Zosyn  -f/u blood cultures  -f/u urine studies  -NPO  - chest vest to help clear secretions    2. Hemorrhagic shock: Patient with persistent UGIB with coffee ground emesis and melanotic stools multiple times requiring IR embolization initially and then EGD with clipping for duodenal ulcer. Patient has been off of his aspirin and plavix for his very recent stent and Eliquis for his chronic afib since he has required so many repeat transfusions due to this active blood loss.    - Hold all AC and anti-platelet agents  - Protonix BID per GI  - Transfuse prn Hgb <8    3. Chronic atrial fibrillation: unable to be on AC for this and seemingly minimally responsive to amiodarone. Cardiology following, no obvious underlying cause aside from hemorrhage, which is difficult to control.   - Continue digoxin per cardiology-change to iv while npo  - Holding eliquis.    4. CAD: multiple stents done as recently as 6/2024 but unable to be on DAPT due to persistent GIB.  - At risk for stent closure, but difficult to manage due to persistent GIB and recent hemorrhagic shock with multiple events requiring frequent transfusions in just the past several weeks.    5. Neuroendocrine carcinoma: Patient with known pancreatic neuroendocrine tumor and was on lanreotide. Was later on radiotherapy but due to multiple hospitalizations, was unable to receive follow-up doses. Patient did have PET done in May that showed lesions suspicious for mets. CT also in May showed increased hepatic metastases.   - Continue octreotide 250mcg TID.    6. GOC:  Palliative family meeting planned for tomorrow. Per patient he lives with wife, has no HHAs, prior to hospitalization walked ~1 block.  Gets most yennifer from seeing his grandkids on the weekends.  Family meeting scheduled this afternoon    7. Anasarca  -patient anasarca, suspect combination of volume overload from transfusions and low albumin.   -limited diuresis 2/2 low BPs on midodrine.    8. DM2: controlled on slide scale    9. cdiff: s/p oral vanc     contact: TEAMS 80 yo M PMH metastatic neuroendocrine pancreatic cancer, hx of CAD s/p PCI x3 with stents placed on 6/12/24, chronic afib, orthostatic hypotension on midodrine, and PAD who presented 7/2 with hypotension found to have UGIB c/b hemorrhagic shock requiring MICU for pressor support with course complicated by C Diff, afib RVR, and RAZIA iso hypotension, transferred to floors with intermittent melena and drop in Hb with no acute surgical nor GI intervention recommended, now with sepsis concern for aspiration pna vs abdominal source on empiric zosyn, guarded prognosis, GOC discussions ongoing family meeting planned for tomorrow.    1. Severe Sepsis: fevers 8/18 and now bandemia 14%!  RVP negative.  CXR appears improved from prior.  Tachy/hypotensive.  S/p 250 albumin x 2 overnight.  Received 500ml LR today, plan to repeat.  Suspect 2/2 aspiration PNA, given clinical presentation of poor secretion clearance, toxic metabolic encephalopathy, wet gurgly cough.  However also has abdominal pain, with bladder scan showing 200cc.  Did have UTI earlier during admission and completed course with cefepime.  -check abdominal xray to r/o free air  -c/w empiric Zosyn  -f/u blood cultures  -f/u urine studies  -NPO  - chest vest to help clear secretions    2. trace apical left PTX: read on cxr.  Not hypoxic. Repeat in AM for progression.  NRB to help with resorption.  Seems hypotension is fluid responsive and more likely related to infectious source than ptx.    3. Hemorrhagic shock: Patient with persistent UGIB with coffee ground emesis and melanotic stools multiple times requiring IR embolization initially and then EGD with clipping for duodenal ulcer. Patient has been off of his aspirin and plavix for his very recent stent and Eliquis for his chronic afib since he has required so many repeat transfusions due to this active blood loss.    - Hold all AC and anti-platelet agents  - Protonix BID per GI  - Transfuse prn Hgb <8    4. Chronic atrial fibrillation: unable to be on AC for this and seemingly minimally responsive to amiodarone. Cardiology following, no obvious underlying cause aside from hemorrhage, which is difficult to control.   - Continue digoxin per cardiology-change to iv while npo  - Holding eliquis.    5. CAD: multiple stents done as recently as 6/2024 but unable to be on DAPT due to persistent GIB.  - At risk for stent closure, but difficult to manage due to persistent GIB and recent hemorrhagic shock with multiple events requiring frequent transfusions in just the past several weeks.    6. Neuroendocrine carcinoma: Patient with known pancreatic neuroendocrine tumor and was on lanreotide. Was later on radiotherapy but due to multiple hospitalizations, was unable to receive follow-up doses. Patient did have PET done in May that showed lesions suspicious for mets. CT also in May showed increased hepatic metastases.   - Continue octreotide 250mcg TID.    7. GOC:  Palliative family meeting planned for tomorrow. Per patient he lives with wife, has no HHAs, prior to hospitalization walked ~1 block.  Gets most yennifer from seeing his grandkids on the weekends.  Family meeting scheduled this afternoon    8. Anasarca  -patient anasarca, suspect combination of volume overload from transfusions and low albumin.   -holding diuresis 2/2 low BP.    9. DM2: controlled on slide scale    10. cdiff: s/p oral vanc     contact: TEAMS

## 2024-08-19 NOTE — PROGRESS NOTE ADULT - PROBLEM SELECTOR PLAN 3
Patient with persistent UGIB with coffee ground emesis and melanotic stools multiple times requiring IR GDA embolization initially and then EGD s/p 3 clips for duodenal ulcer. Patient has been off of his aspirin and plavix for his very recent stent and Eliquis for his chronic afib since he has required so many repeat transfusions due to this active blood loss. The hemorrhagic shock seems to have temporarily resolved. Fibrinogen and PT/INR in normal ranges. CTAP 8/4 and 8/8 showed no strong indications of active bleeding. Hgb currently stable for several days.    Plan:  - Continue midodrine 10mg q8 as necessary  - Protonix BID per GI; consider switching to PO if patient can tolerate pills  - Hold all AC and anti-platelet agents  - Continue CBC q24 unless having active bleeding, transfuse Hgb <8  - Maintain T&S q48 hours  - Can consider iron chelation therapy given many transfusions Previously evaluated by speech therapy  -MBS on 8/6 recommended puree with thickened liquids but patient is grossly aspirating with very weak cough and now with likely recurrent PNA  -d/c Hypersal nebs  -d/c guaifenesin   -NPO pending GOC discussion with family tomorrow at 3pm

## 2024-08-19 NOTE — PROGRESS NOTE ADULT - PROBLEM SELECTOR PLAN 9
Patient with CAD with multiple stents done as recently as 6/2024 but unable to be on DAPT due to persistent GIB.    Plan:  - At risk for stent closure, but difficult to manage due to persistent GIB and recent hemorrhagic shock with multiple events requiring frequent transfusions in just the past several weeks.  - Continue to hold anti-platelet agents Patient with T2DM  - SSI

## 2024-08-19 NOTE — PROVIDER CONTACT NOTE (SEPSIS SCREENING) - BACKGROUND:
Initially p/w symptomatic hypotension. FTT, chronic afib, CAD s/p PCI x 3. GIB s/p scope, clips, embolization
Patient being treated for aspiration pneumonia 
(Admit Diagnosis) Acute renal failure  79 yo male with metastatic neuroendocrine pancreatic cancer (tx on hold) and PMH of CAD s/p PCI x3 with most recent stent 6/12/24 on Plavix and eliquis

## 2024-08-19 NOTE — CHART NOTE - NSCHARTNOTEFT_GEN_A_CORE
Discussed with wife and daughter at bedside tonight that patient meets criteria for severe sepsis, likely source is aspiration PNA. He has been hypotensive today that has been, thus far, fluid responsive. Required 1.5L LR total today and 500cc albumin last night. He is now NPO due to gross clinical aspiration, inability to clear his secretions. Unable to place NGT due to patient agitation, he is encephalopathic and will likely pull it out leading to further aspiration. This occurring in the setting of baseline poor functional status, metastatic pancreatic neuroendocrine tumor, recurrent GIB.     We discussed that he is extremely high risk of decompensation and requiring ICU transfer for pressors tonight or in the near future. They are aware of the fact that if he went into cardiac arrest, CPR and intubation would be unlikely to change is overall outcome. Family seems divided on next step approach but is aware that he is very ill overall. He remains full code. Ongoing goals of care, meeting with palliative at 3pm tomorrow.     Christal Connolly, PGY-1  Internal Medicine

## 2024-08-19 NOTE — PROGRESS NOTE ADULT - PROBLEM SELECTOR PLAN 6
Patient with failure to thrive iso metastatic disease as well as repeated intubation and difficulty swallowing.  MBS 8/6 showing no gross aspiration, but still high-risk. Recommended pureed diet with mildly thickened liquids and aspiration precautions given patient and family desire PO diet. NG tube removed 8/6 after study.    Plan:  - Continue diet, to be supplemented with RD recs  - Start mirtazepine 7.5mg qhs for appetite stimulation, uptitrate as necessary  - Monitor electrolytes as high risk for refeeding syndrome  - GOC meeting with family (wife, daughter) and palliative last week; family meeting this Tuesday 3pm 8/20; wife wonders if heme/onc can be involved in this meeting either in person or over the phone  - Plan for out of bed into chair once recliner brought into room Patient with persistent UGIB with coffee ground emesis and melanotic stools multiple times requiring IR GDA embolization initially and then EGD s/p 3 clips for duodenal ulcer. Patient has been off of his aspirin and plavix for his very recent stent and Eliquis for his chronic afib since he has required so many repeat transfusions due to this active blood loss.     Plan:  - Continue midodrine 10mg q8 as necessary  - Protonix BID per GI; consider switching to PO if patient can tolerate pills  - Hold all AC and anti-platelet agents  - Continue CBC q24 unless having active bleeding, transfuse Hgb <8  - Maintain T&S q48 hours

## 2024-08-19 NOTE — PROVIDER CONTACT NOTE (SEPSIS SCREENING) - SEPSIS CRITERIA TO COINCIDE WITH MEWS
Heart Rate greater than 90/Temperature less than 96.8 or greater than 100.4/Acute Altered Mental Status
Heart Rate greater than 90
Heart Rate greater than 90/Temperature less than 96.8 or greater than 100.4/Acute Altered Mental Status

## 2024-08-19 NOTE — PROVIDER CONTACT NOTE (SEPSIS SCREENING) - ACTION/TREATMENT ORDERED:
Blood cultures and lactate sent last night. Patient receiving bolus of fluids and antibiotics. Pending family meeting.    
RRT called. See RRT sheet 
ABG, Stat labs (BCx, Lactate), Abx's, IV fluids (cold), Albumin

## 2024-08-19 NOTE — PROGRESS NOTE ADULT - ASSESSMENT
77 yo male with PMH of CAD s/p PCI x3 with most recent stent 6/12/24 on Plavix, chronic Afib on eliquis, orthostatic hypotension on midodrine, PAD, neuroendocrine tumor with RT currently on hold, recent admission for dx cath on 6/24/24 who presented for hypotension, admitted for GIB and hemorrhagic shock. Found to be bleeding from duodenum. Transferred from MICU to floors, off pressors.     1. Pancreatic NET- metastatic   -- was on lanreotide then had POD in liver and started on peptide receptor radiotherapy (PRRT)- typically given every 8 weeks for 4 doses. He received one dose on 10/20/2023 and planned to get his 2nd dose at the end of December however his course was complicated by multiple hospitalizations that were noncancer related (decompensated heart failure).   -- 5/28/24 PET Dotatate (compared to 9/2023): several new somatostatin avid osseous lesions, some faintly sclerotic, suspicious for mets. Increased upper RP nodes, probably metastatic. Decreased intensely tracer avid right supradiaphragmatic and upper abdominal nodes, suspicious for metastasis. Redemonstrated intensely somatostatin avid bilobar hepatic lesions, consistent with metastases again morphologically difficult to delineate.   -- CT reviewed by [facility]: SINCE MAY 8 2024 INCREASED HEPATIC METASTASES, NEWLY SEEN PRIMARY TUMOR IN THE PANCREAS, and active bleeding within the duodenum with associated intraluminal hematoma.   -- chromogranin level is elevated at 2058, but no prior level at [facility] for review   -- [place] [facility] [place], [place] plan for chemotherapy inpatient, if clinically improves/stabilizes then can be considered for treatment outpatient  --Continue octreotide 250mcg TID  --Goals of care meeting next week as wife would like other family members involved    2. Duodenal bleed, Hemorrhagic shock  - Now transferred to medicine from MICU, s/p extubation 7/22. NG tube in place.  - CT w/ bleed in duodenum. GI called, EGD 7/9 -> S/p  IR embolization 7/9, intubated in MICU -> s/p extubation 7/15 -> intubated 7/18  - s/p multiple transfusions, fibrinogen low would recommend Cryo for < 100, but coags have improved as are essentially normal now so unlikely, probably was related to liver dysfunction.   - s/p repeat EGD 7/12 showing duodenal lesions w/clots and oozing, no clear targets for intervention and no active bleeding, purastat applied to all areas of oozing.   - S/p EGD 7/18: duodenal ulcer with active oozing, ulcer with visible vessel. Bleeding treated.   - s/p multiple transfusions, per GI, high risk for rebleed, will continue PPI.   - Repeat CT AP on w/ No evidence of GI bleed.  Interval endoscopic versus surgical intervention with metallic streak artifact suggestive of surgical clips in the region of the proximal one third portions of the duodenum. Evaluation of the previously seen hematoma is limited secondary to this artifact. Moderate bilateral pleural effusions and associated compressive atelectasis, right greater than left, overall slightly increased from previous CT. Anasarca.  - Per IR, In the absences of any active extravasation on CTA there's no place to target for an embolization  - 7/29 sputum culture + Pseudomonas; RRT on 8/2 due to tachycardia, hypotension; febrile to 101. Was on cefepime x 7 days  - Continue octreotide 250 mcg TID  - CT AP 8/4: No evidence of active intraluminal extravasation of contrast. No evidence of acute intraperitoneal or retroperitoneal hemorrhage  - 8/8 CTA a/p d/t reports of melena, H/H stable--> Limited evaluation for active gastrointestinal hemorrhage due to high density oral contrast within the colon and streak artifact from orthopedic hardware, embolization material, and duodenal clips. Question a focus of enhancement in the duodenum adjacent to the duodenal clips versus streak artifact, with active hemorrhage within the duodenum not excluded.  - 8/19 Overnight tachycardic, febrile and melena - started zosyn, cultures sent.     3. C. diff   - diarrhea resolved w/ ongoing melena, PO vanco discontinued today    Will continue to follow.    Hugo Topete PA-C  Hematology/Oncology  [facility]  [phone] (office)

## 2024-08-19 NOTE — PROGRESS NOTE ADULT - PROBLEM SELECTOR PLAN 2
Patient with chronic afib unable to be on AC for this and seemingly minimally responsive to amiodarone. Cardiology following, no obvious underlying cause aside from hemorrhage, which is difficult to control. Rates in 130s on tele.    Plan:  - Dig level 0.6 (low) 8/12. 0.7 (low) 8/16  - Continue digoxin to 125mcg every day  - Increase metoprolol to 25mg q8  - Check trough tomorrow (not within 6 hours of giving dose); ordered  - Holding eliquis Shunt through atelectasis from bilateral pleural effusions and chronic aspiration    -CXR 8/19 shows slightly improved pleural effusions after bumex dosing for several days  -Chronically aspirating, now NPO

## 2024-08-19 NOTE — PROGRESS NOTE ADULT - ASSESSMENT
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 Transferred to floors with intermittent melena and Hgb drops; currently no acute surgical or GI intervention recommended. GOC discussions ongoing with palliative. Currently continuing to diurese with overall improvement. 80 y/o M with PMH of metastatic neuroendocrine pancreatic cancer, hx of CAD s/p recent PCI x3 (6/12/24), chronic afib, orthostatic hypotension on midodrine, and PAD admitted 7/2 for UGIB and hemorrhagic shock s/p IR for GDA embolization, duodenal ulcer s/p 3 clips. He was re-challenged with aspirin and re-bled. Now off all AC. Complicated hospital course with multiple intubations, infections, C Diff, Afib with RVR, NSVT, and RAZIA. He developed AHRF due to pleural effusions and was in the process of diuresis. On 8/18 he developed sepsis-fever, rapid Afib with bandemia most likely due to aspiration PNA.

## 2024-08-19 NOTE — PROGRESS NOTE ADULT - PROBLEM SELECTOR PLAN 5
Patient with cough over past 2 weeks and unable to bring up secretions. CXR with bilateral pleural effusions. Previous sputum 7/29 with carbapenem resistant pseudomonas.    Plan:  - F/u sputum culture once collected  - Continue Mucinex  - Continue chest vest PT  - Continue 3% NaCl neb q12  - Consider duonebs Now with Afib with RVR in the setting of sepsis.    Plan:  -Dig 125mcg IV daily   -Metoprolol 5mg q6 standing while NPO  -Metoprolol 5mg PRN for rates >140  -No AC due to GIB

## 2024-08-19 NOTE — PROGRESS NOTE ADULT - PROBLEM SELECTOR PLAN 7
Patient with episodes of NSVT throughout admission.    Plan:  - Increase metoprolol to 25mg q8  - Replete electrolytes as necessary  - Continue to monitor Patient with CAD with multiple stents done as recently as 6/2024 but unable to be on DAPT due to persistent GIB.    Plan:  - At risk for stent closure, but difficult to manage due to persistent GIB and recent hemorrhagic shock with multiple events requiring frequent transfusions in just the past several weeks.  - Continue to hold anti-platelet agents

## 2024-08-19 NOTE — CHART NOTE - NSCHARTNOTEFT_GEN_A_CORE
Plan for IDT family meeting tomorrow Tuesday 8/20 at 3p at bedside. Can be reached by TEAMS M-F 9-5 Marium Weiner Any other time please page [phone] if needed

## 2024-08-20 NOTE — PROGRESS NOTE ADULT - SUBJECTIVE AND OBJECTIVE BOX
SUBJECTIVE AND OBJECTIVE: pt seen and examined at bedside. pt lethargic on exam.   Indication for Geriatrics and Palliative Care Services/INTERVAL HPI: goc     OVERNIGHT EVENTS: pt h&h low overnight requiring transfusion     DNR on chart:  Allergies    No Known Allergies    Intolerances    MEDICATIONS  (STANDING):  AQUAPHOR (petrolatum Ointment) 1 Application(s) Topical two times a day  cefepime   IVPB 2000 milliGRAM(s) IV Intermittent every 12 hours  cefepime   IVPB      chlorhexidine 2% Cloths 1 Application(s) Topical <User Schedule>  dextrose 50% Injectable 25 Gram(s) IV Push once  folic acid Injectable 1 milliGRAM(s) IV Push daily  insulin lispro (ADMELOG) corrective regimen sliding scale   SubCutaneous three times a day before meals  insulin lispro (ADMELOG) corrective regimen sliding scale   SubCutaneous at bedtime  metoprolol tartrate Injectable 5 milliGRAM(s) IV Push every 6 hours  mupirocin 2% Nasal 1 Application(s) Both Nostrils two times a day  octreotide  Injectable 250 MICROGram(s) IV Push three times a day  pantoprazole  Injectable 40 milliGRAM(s) IV Push every 12 hours  sodium chloride 0.45%. 1000 milliLiter(s) (50 mL/Hr) IV Continuous <Continuous>  thiamine Injectable 100 milliGRAM(s) IV Push daily    MEDICATIONS  (PRN):  ondansetron Injectable 4 milliGRAM(s) IV Push once PRN Nausea and/or Vomiting      ITEMS UNCHECKED ARE NOT PRESENT    PRESENT SYMPTOMS: [x ]Unable to self-report - see [ ] CPOT [x ] PAINADS [x ] RDOS  Source if other than patient:  [ ]Family   [ x]Team     Pain:  [ ]yes [ ]no  QOL impact -   Location -                    Aggravating factors -  Quality -  Radiation -  Timing-  Severity (0-10 scale):  Minimal acceptable level (0-10 scale):     CPOT:    https://www.sccm.org/getattachment/nmw60j17-1j9l-3m6o-5u9k-2833q1811n8f/Critical-Care-Pain-Observation-Tool-(CPOT)    PAINAD Score: See PAINAD tool and score below       Dyspnea:                           [ ]Mild [ ]Moderate [ ]Severe    RDOS: See RDOS tool and score below   0 to 2  minimal or no respiratory distress   3  mild distress  4 to 6 moderate distress  >7 severe distress      Anxiety:                             [ ]Mild [ ]Moderate [ ]Severe  Fatigue:                             [ ]Mild [ ]Moderate [ ]Severe  Nausea:                             [ ]Mild [ ]Moderate [ ]Severe  Loss of appetite:              [ ]Mild [ ]Moderate [ ]Severe  Constipation:                    [ ]Mild [ ]Moderate [ ]Severe    PCSSQ[Palliative Care Spiritual Screening Question]   Severity (0-10):  Score of 4 or > indicate consideration of Chaplaincy referral.  Chaplaincy Referral: [ ] yes [ ] refused [ ] following [ ] Deferred     Caregiver Sheridan? : [ ] yes [ ] no [ ] Deferred [ ] Declined             Social work referral [ ] Patient & Family Centered Care Referral [ ]     Anticipatory Grief present?:  [ ] yes [ ] no  [ ] Deferred                  Social work referral [ ] Chaplaincy Referral [ ]    		  Other Symptoms:  [ x]All other review of systems negative     Palliative Performance Status Version 2:   See PPSv2 tool and score below         PHYSICAL EXAM:  Vital Signs Last 24 Hrs  T(C): 36.7 (20 Aug 2024 12:32), Max: 37.7 (19 Aug 2024 17:50)  T(F): 98 (20 Aug 2024 12:32), Max: 99.9 (19 Aug 2024 17:50)  HR: 89 (20 Aug 2024 12:32) (70 - 123)  BP: 96/70 (20 Aug 2024 12:32) (77/51 - 144/68)  BP(mean): --  RR: 18 (20 Aug 2024 12:32) (18 - 18)  SpO2: 100% (20 Aug 2024 12:32) (98% - 100%)    Parameters below as of 20 Aug 2024 12:32  Patient On (Oxygen Delivery Method): mask, nonrebreather     I&O's Summary    19 Aug 2024 07:01  -  20 Aug 2024 07:00  --------------------------------------------------------  IN: 0 mL / OUT: 0 mL / NET: 0 mL       GENERAL: [ ]Cachexia    [ ]Alert  [ ]Oriented x   [ x]Lethargic  [ ]Unarousable  [ ]Verbal  [ ]Non-Verbal  Behavioral:   [ ]Anxiety  [ ]Delirium [ ]Agitation [ ]Other  HEENT:  [ ]Normal   [x ]Dry mouth   [ ]ET Tube/Trach  [ ]Oral lesions  PULMONARY:   [ ]Clear [ ]Tachypnea  [ x]Audible excessive secretions   [ ]Rhonchi        [ ]Right [ ]Left [ ]Bilateral  [ ]Crackles        [ ]Right [ ]Left [ ]Bilateral  [ ]Wheezing     [ ]Right [ ]Left [ ]Bilateral  [ ]Diminished BS [ ] Right [ ]Left [ ]Bilateral  CARDIOVASCULAR:    [x ]Regular [ ]Irregular [ ]Tachy  [ ]Lavelle [ ]Murmur [ ]Other  GASTROINTESTINAL:  [x ]Soft  [ ]Distended   [x ]+BS  [ ]Non tender [ ]Tender  [ ]Other [ ]PEG [ ]OGT/ NGT   Last BM:   GENITOURINARY:  [ ]Normal [ ]Incontinent   [ ]Oliguria/Anuria   [ ]Ivory  MUSCULOSKELETAL:   [ ]Normal   [x ]Weakness  [x ]Bed/Wheelchair bound [ ]Edema  NEUROLOGIC:   [ ]No focal deficits  [x ] Cognitive impairment  [ ] Dysphagia [ ]Dysarthria [ ] Paresis [ ]Other   SKIN:   [ ]Normal  [ ]Rash  [ ]Other  [ ]Pressure ulcer(s) [ ]y [ ]n present on admission    CRITICAL CARE:  [ ]Shock Present  [ ]Septic [ ]Cardiogenic [ ]Neurologic [ ]Hypovolemic  [ ]Vasopressors [ ]Inotropes  [ ]Respiratory failure present [ ]Mechanical Ventilation [ ]Non-invasive ventilatory support [ ]High-Flow   [ ]Acute  [ ]Chronic [ ]Hypoxic  [ ]Hypercarbic [ ]Other  [ ]Other organ failure     LABS:                        8.6    9.83  )-----------( 106      ( 20 Aug 2024 04:21 )             26.3   08-20    150<H>  |  109<H>  |  35<H>  ----------------------------<  155<H>  3.6   |  26  |  1.35<H>    Ca    7.4<L>      20 Aug 2024 04:21  Phos  4.4     08-20  Mg     1.7     08-20    TPro  4.5<L>  /  Alb  2.6<L>  /  TBili  1.0  /  DBili  x   /  AST  415<H>  /  ALT  188<H>  /  AlkPhos  138<H>  08-20      Urinalysis Basic - ( 20 Aug 2024 04:21 )    Color: x / Appearance: x / SG: x / pH: x  Gluc: 155 mg/dL / Ketone: x  / Bili: x / Urobili: x   Blood: x / Protein: x / Nitrite: x   Leuk Esterase: x / RBC: x / WBC x   Sq Epi: x / Non Sq Epi: x / Bacteria: x      RADIOLOGY & ADDITIONAL STUDIES:  < from: CT Angio Abdomen and Pelvis w/ IV Cont (08.08.24 @ 14:03) >  ACC: [id number] EXAM:  CT ANGIO ABD PELV (W)AW IC   ORDERED BY:  HEATHER CALLAHAN     PROCEDURE DATE:  08/08/2024          INTERPRETATION:  CLINICAL INFORMATION: New melena. Metastatic pancreatic   neuroendocrine tumor.    COMPARISON: CT abdomen and pelvis8/4/2024.    CONTRAST/COMPLICATIONS:  IV Contrast: Omnipaque 350  90 cc administered   10 cc discarded  Oral Contrast: NONE  Complications: None reported at time of study completion    PROCEDURE:  CT of the Abdomen and Pelvis was performed.  Precontrast, Arterial and Delayed phases were performed.  Sagittal and coronal reformats were performed.    FINDINGS:  LOWER CHEST: Moderate bilateral pleural effusions. Complete atelectasis   of the imaged left lower lobe. Partial atelectasis in the left upper and   right lower lobes. Coronary artery and aortic valve calcifications.   Cardiomegaly. Partially imaged soft tissue gas tracking between the left   pectoralis major and minor muscles.    LIVER: Nodular liver contour. Again seen are numerous ill-defined   hypoattenuating lesions throughout the liver, the largest in the right   lobe measuring approximately 7.0 cm.  BILE DUCTS: Normal caliber.  GALLBLADDER: Layering hyperdense material, which may reflect vicarious   excretion of contrast, sludge, or stones.  SPLEEN: Within normal limits.  PANCREAS: Within normal limits.  ADRENALS: Within normal limits.  KIDNEYS/URETERS: No hydronephrosis. Nonobstructing left renal calculus   measuring 5 mm. Bilateral renal cortical thinning. Bilateral hypodense   renal lesions.    BLADDER: Hyperdense fluid within the bladder could reflect renally   excreted contrast. Small amount of gas within the bladder, slightly   decreased since the prior CT. Several bladder stones with example   measuring 3.5 x 2.3cm.  REPRODUCTIVE ORGANS: The prostate is normal in size and contains coarse   calcification.    BOWEL: Residual high density oral contrast within the colon from recent   fluoroscopic barium swallow study, which markedly limits evaluation of   the colon for active hemorrhage, and also causes streak artifact that   obscures adjacent structures. There is also streak artifact from spinal   hardware, embolization material in the GDA distribution, and clips within   the duodenum, which also limits assessment for active gastrointestinal   hemorrhage. Question a focus of arterial enhancement immediately adjacent   to the duodenal clips (series 6 image 100), without definite pooling   appreciated on the delayed phase. No bowel obstruction.  PERITONEUM/RETROPERITONEUM: Small amount of peritoneal fluid and edema of   the peritoneum.  VESSELS: Atherosclerotic changes. Embolization material in the GDA   distribution.  LYMPH NODES: No lymphadenopathy.  ABDOMINAL WALL: Diffuse subcutaneous edema. Tiny fat-containing umbilical   hernia.  BONES: Right hip arthroplasty with associated streak artifact obscuring   portions of the pelvis. There is also extensive spinal hardware with   streak artifact obscuring adjacent structures. Posterior fusion spanning   T10-S1 with bilateral sacroiliac extension. Interbody fusion at L5-S1.   Multilevel lumbar laminectomies.    IMPRESSION:  *  Limited evaluation for active gastrointestinal hemorrhage due to high   density oral contrast within the colon and streakartifact from   orthopedic hardware, embolization material, and duodenal clips. Question   a focus of enhancement in the duodenum adjacent to the duodenal clips   versus streak artifact, with active hemorrhage within the duodenum not   excluded.  *  Moderate bilateral pleural effusions. Marked bibasilar atelectasis.  *  Partially imaged soft tissue gas tracking between the left pectoralis   major and minor muscles of uncertain etiology.  *  Additional findings as described in the report.    Findings were discussed with Dr. Jodie Okeefe 8/8/2024 3:12 PM by Dr. Almonte with read back confirmation.    --- End of Report ---            DEBBY ALMONTE MD; Attending Radiologist  This document has been electronically signed. Aug  8 2024  3:16PM    < end of copied text >    Protein Calorie Malnutrition Present: [ ]mild [ ]moderate [ ]severe [ ]underweight [ ]morbid obesity  https://www.andeal.org/vault/2440/web/files/ONC/Table_Clinical%20Characteristics%20to%20Document%20Malnutrition-White%20JV%20et%20al%202012.pdf    Height (cm): 188 (07-09-24 @ 13:27), 188 (06-24-24 @ 13:43), 188 (06-12-24 @ 11:58)  Weight (kg): 125.6 (07-21-24 @ 00:00), 102.5 (06-24-24 @ 13:43), 102.1 (06-12-24 @ 11:58)  BMI (kg/m2): 35.5 (07-21-24 @ 00:00), 29 (07-09-24 @ 13:27), 29 (06-24-24 @ 13:43)    [x ]PPSV2 < or = 30%  [ ]significant weight loss [ ]poor nutritional intake [ ]anasarca[ ]Artificial Nutrition    Other REFERRALS:  [ ]Hospice  [ ]Child Life  [ ]Social Work  [ ]Case management [ ]Holistic Therapy     Goals of Care Document:

## 2024-08-20 NOTE — PROGRESS NOTE ADULT - SUBJECTIVE AND OBJECTIVE BOX
DATE OF SERVICE: 08-20-24 @ 18:03    Patient is a 79y old  Male who presents with a chief complaint of hypotension (20 Aug 2024 14:54)      INTERVAL HISTORY: in no acute distress    REVIEW OF SYSTEMS:  CONSTITUTIONAL: No weakness  EYES/ENT: No visual changes;  No throat pain   NECK: No pain or stiffness  RESPIRATORY: No cough, wheezing; No shortness of breath  CARDIOVASCULAR: No chest pain or palpitations  GASTROINTESTINAL: No abdominal  pain. No nausea, vomiting, or hematemesis  GENITOURINARY: No dysuria, frequency or hematuria  NEUROLOGICAL: No stroke like symptoms  SKIN: No rashes    TELEMETRY Personally reviewed: Afib 100s  	  MEDICATIONS:  metoprolol tartrate Injectable 5 milliGRAM(s) IV Push every 6 hours        PHYSICAL EXAM:  T(C): 36.9 (08-20-24 @ 16:27), Max: 37.1 (08-20-24 @ 04:03)  HR: 106 (08-20-24 @ 16:27) (89 - 106)  BP: 109/67 (08-20-24 @ 16:27) (90/55 - 110/66)  RR: 18 (08-20-24 @ 16:27) (18 - 20)  SpO2: 98% (08-20-24 @ 16:27) (80% - 100%)  Wt(kg): --  I&O's Summary    19 Aug 2024 07:01  -  20 Aug 2024 07:00  --------------------------------------------------------  IN: 0 mL / OUT: 0 mL / NET: 0 mL          Appearance: In no distress	  HEENT:    PERRL, EOMI	  Cardiovascular:  S1 S2, No JVD  Respiratory: Lungs clear to auscultation	  Gastrointestinal:  Soft, Non-tender, + BS	  Vascularature:  No edema of LE  Psychiatric: Appropriate affect   Neuro: no acute focal deficits                               8.6    9.83  )-----------( 106      ( 20 Aug 2024 04:21 )             26.3     08-20    150<H>  |  109<H>  |  35<H>  ----------------------------<  155<H>  3.6   |  26  |  1.35<H>    Ca    7.4<L>      20 Aug 2024 04:21  Phos  4.4     08-20  Mg     1.7     08-20    TPro  4.5<L>  /  Alb  2.6<L>  /  TBili  1.0  /  DBili  x   /  AST  415<H>  /  ALT  188<H>  /  AlkPhos  138<H>  08-20        Labs personally reviewed      ASSESSMENT/PLAN: 	    78-year-old male multiple medical problems history of hepatocellular carcinoma status post radiation therapy, AF s/p DCCV on Eliquis who was found to be hypotensive following NST. Patient has reported general weakness, fatigue, lightheadedness since being discharged from hospital. Reports mild chest discomfort in midsternal region. Reports dyspnea with minimal exertion. Also reports significant lack of appetite and poor PO intake. No dark or bloody stool, nausea or vomiting.       Problem/Plan - 1:  ·  Problem: Hypotension  ·  Plan: s/p pressors in MICU.  Now transferred to [facility]  - Patient presents with hypotension and also RAZIA. Has known orthostatic hypotension.   - Patient and wife report decreased PO intake likely 2/2 malignancy.  - GIB 7/9, s/p multiple units prbc, IR for mesenteric embolization; Repeat CT A/P with no extravazation  - c/w Midodrine 10mg PO [id number]  - CT A/P inconclusive     Problem/Plan - 2:  ·  Problem: CAD.   ·  Plan: Recent PCI to pLAD in June 2023  - ECG non-ischemic  - Plavix held given large melena; ideally should be on Plavix but high risk to resume      Problem/Plan - 3:  ·  Problem: Atrial Fibrillation  - s/p succesful DCCV 10/31  - Hold Eliquis 5mg BID given GIB  - s/p Amio  - c/w Dig 125mcg PO QOD  - Metoprolol 25mg BID  - 8/19 Afib with RVR and fevers. IV dig x 1 given     Problem/Plan - 4:  ·  Problem: Acute Hypoxic Respiratory Failure  - Now requiring 3L NC  - CT Chest shows B/L pleural effusion and atelectasis  - BNP 12K  - s/p Lasix 40mg IV once with albumin 8/9  - s/p IV administration of albumin 8/12  - Cont with Bumex 2mg IV daily; Transition to 2mg PO daily when no longer hypoxic      Dr Orville Mock will be covering for Dr. Hayes 8/19-8/22.            Iolani Behrbom, AG-ESTEFANI Hayes,  [facility]  Cardiovascular Medicine  [address] [place] [address], [address]  Available through call or text on [employer] TEAM[employer]  Office: [phone]   DATE OF SERVICE: 08-20-24 @ 18:03    Patient is a 79y old  Male who presents with a chief complaint of hypotension (20 Aug 2024 14:54)      INTERVAL HISTORY: SOB on non rebreather         TELEMETRY Personally reviewed: Afib 100s  	  MEDICATIONS:  metoprolol tartrate Injectable 5 milliGRAM(s) IV Push every 6 hours        PHYSICAL EXAM:  T(C): 36.9 (08-20-24 @ 16:27), Max: 37.1 (08-20-24 @ 04:03)  HR: 106 (08-20-24 @ 16:27) (89 - 106)  BP: 109/67 (08-20-24 @ 16:27) (90/55 - 110/66)  RR: 18 (08-20-24 @ 16:27) (18 - 20)  SpO2: 98% (08-20-24 @ 16:27) (80% - 100%)  Wt(kg): --  I&O's Summary    19 Aug 2024 07:01  -  20 Aug 2024 07:00  --------------------------------------------------------  IN: 0 mL / OUT: 0 mL / NET: 0 mL          Appearance: on NRB lethargic  HEENT:    PERRL, EOMI	  Cardiovascular:  S1 S2, No JVD  Respiratory: b/l rhonchi   Gastrointestinal:  Soft, Non-tender, + BS	  Vascularature:  No edema of LE  Psychiatric: Appropriate affect   Neuro: no acute focal deficits                               8.6    9.83  )-----------( 106      ( 20 Aug 2024 04:21 )             26.3     08-20    150<H>  |  109<H>  |  35<H>  ----------------------------<  155<H>  3.6   |  26  |  1.35<H>    Ca    7.4<L>      20 Aug 2024 04:21  Phos  4.4     08-20  Mg     1.7     08-20    TPro  4.5<L>  /  Alb  2.6<L>  /  TBili  1.0  /  DBili  x   /  AST  415<H>  /  ALT  188<H>  /  AlkPhos  138<H>  08-20        Labs personally reviewed      ASSESSMENT/PLAN: 	    78-year-old male multiple medical problems history of hepatocellular carcinoma status post radiation therapy, AF s/p DCCV on Eliquis who was found to be hypotensive following NST. Patient has reported general weakness, fatigue, lightheadedness since being discharged from hospital. Reports mild chest discomfort in midsternal region. Reports dyspnea with minimal exertion. Also reports significant lack of appetite and poor PO intake. No dark or bloody stool, nausea or vomiting.       Problem/Plan - 1:  ·  Problem: Hypotension  ·  Plan: s/p pressors in MICU.  Now transferred to [facility]  - Patient presents with hypotension and also RAZIA. Has known orthostatic hypotension.   - Patient and wife report decreased PO intake likely 2/2 malignancy.  - GIB 7/9, s/p multiple units prbc, IR for mesenteric embolization; Repeat CT A/P with no extravazation  - c/w Midodrine 10mg PO [id number]  - CT A/P inconclusive  - - palliative care poor prognosis     Problem/Plan - 2:  ·  Problem: CAD.   ·  Plan: Recent PCI to pLAD in June 2023  - ECG non-ischemic  - Plavix held given large melena; ideally should be on Plavix but high risk to resume      Problem/Plan - 3:  ·  Problem: Atrial Fibrillation  - s/p succesful DCCV 10/31  - Hold Eliquis 5mg BID given GIB  - s/p Amio  - c/w Dig 125mcg IV  - Metoprolol IV  - 8/19 Afib with RVR and fevers. IV dig x 1 given     Problem/Plan - 4:  ·  Problem: Acute Hypoxic Respiratory Failure  - Now requiring NRB  - CT Chest shows B/L pleural effusion and atelectasis  - BNP 12K  - s/p Lasix 40mg IV once with albumin 8/9  - s/p IV administration of albumin 8/12  - s/p Bumex   - palliative care poor prognosis      Dr Orville Mock will be covering for Dr. Hayes 8/19-8/22.            Iolani Behrbom, ROSIO-NP   Hank Hayes DO [facility]  Cardiovascular Medicine  [address], [address]  Available through call or text on [employer] TEAM[employer]  Office: [phone]

## 2024-08-20 NOTE — PROGRESS NOTE ADULT - ASSESSMENT
78 y/o M with PMH of metastatic neuroendocrine pancreatic cancer, hx of CAD s/p recent PCI x3 (6/12/24), chronic afib, orthostatic hypotension on midodrine, and PAD admitted 7/2 for UGIB and hemorrhagic shock s/p IR for GDA embolization, duodenal ulcer s/p 3 clips. He was re-challenged with aspirin and re-bled. Now off all AC. Complicated hospital course with multiple intubations, infections, C Diff, Afib with RVR, NSVT, and RAZIA. He developed AHRF due to pleural effusions and was in the process of diuresis. On 8/18 he developed sepsis-fever, rapid Afib with bandemia most likely due to aspiration PNA. Now with RAZIA, transaminitis likely due to hypotension.

## 2024-08-20 NOTE — PROGRESS NOTE ADULT - ATTENDING SUPERVISION STATEMENT
Fellow
Resident
Fellow
Resident
Resident/Student
Resident
Fellow
Resident
Resident/Student
Resident
Resident/Student
Resident
Resident/Student
Resident
Resident
Fellow
Resident

## 2024-08-20 NOTE — PROGRESS NOTE ADULT - PROBLEM SELECTOR PLAN 2
pt with intermittent NSVT throughout admission  defer to primary team for ongoing management pt with episode of melena  received 1u PRBC today   no further work up as goals transitioned to comfort

## 2024-08-20 NOTE — PROGRESS NOTE ADULT - PROBLEM SELECTOR PLAN 4
Shunt through atelectasis from bilateral pleural effusions and chronic aspiration    -CXR 8/19 shows slightly improved pleural effusions after bumex dosing for several days  -Chronically aspirating, now NPO

## 2024-08-20 NOTE — PROGRESS NOTE ADULT - PROBLEM SELECTOR PLAN 6
Patient with known pancreatic neuroendocrine tumor and was on lanreotide. Was later on radiotherapy but due to multiple hospitalizations, was unable to receive follow-up doses. Patient did have PET done in May that showed lesions suspicious for mets. CT also in May showed increased hepatic metastases.   - Continue octreotide 250mcg TID will continue to follow for symptoms/goc  started  Dilaudid 0.5 mg IV q2h prn dyspnea or tachypnea  Dilaudid 0.5 mg IV qh prn severe pain and Dilaudid 0.3 mg IV q2h prn moderate pain  Ativan 0.25mg IV q2h prn agitation   Glycopyrrolate 0.4 mg IV q6h prn copious oral secretions   Dulcolax suppository daily prn constipation   page palliative if symptoms unmanaged  Can be reached by TEAMS M-F 9-5 Marium Weiner Any other time please page [phone] if needed

## 2024-08-20 NOTE — PROGRESS NOTE ADULT - PROBLEM SELECTOR PLAN 9
Hospice vs GAIL  will continue to follow  case discussed with primary team  Can be reached by TEAMS M-F 9-5 Marium Weiner Any other time please page [phone] if needed

## 2024-08-20 NOTE — PROGRESS NOTE ADULT - PROBLEM SELECTOR PLAN 1
Likely 2/2 aspiration PNA, given clinical presentation of poor secretion clearance, toxic metabolic encephalopathy, wet gurgly cough    -c/w empiric Zosyn and PO Vanco given CDiff history  -Sputum culture 7/28: Carbapenem resistant pseudomonas   -Received 1.5L total LR and 500cc albumin bolus  -MRSA negative  -Blood culture prelim negative   -Urine studies not sent  -CXR without clear consolidation  -NPO. Hold off on NGT as he is encephalopathic-concern that he would not be cooperative   -Refer to chart event note yesterday for GOC discussion Likely 2/2 aspiration PNA, given clinical presentation of poor secretion clearance, toxic metabolic encephalopathy, wet gurgly cough    -Change abx to Cefepime (less salt load and nephrotoxic compared to zosyn) and PO Vanco given CDiff history  -Sputum culture 7/28: Carbapenem resistant pseudomonas   -Received 1.5L total LR and 500cc albumin bolus  -MRSA negative  -Blood culture prelim negative   -Urine studies not sent  -CXR without clear consolidation  -NPO. Hold off on NGT as he is encephalopathic-concern that he would not be cooperative   -Refer to chart event note yesterday for GOC discussion

## 2024-08-20 NOTE — PROGRESS NOTE ADULT - REASON FOR ADMISSION
hypotension

## 2024-08-20 NOTE — PROGRESS NOTE ADULT - NS ATTEND OPT1 GEN_ALL_CORE

## 2024-08-20 NOTE — PROGRESS NOTE ADULT - NSPROGADDITIONALINFOA_GEN_ALL_CORE
I reviewed the overnight course of events on the unit, re-confirming the patient history. I discussed the care with the patient and their family. The plan of care was discussed with the ACP team and modifications were made to the notation where appropriate. Differential diagnosis and plan of care discussed with patient after the evaluation. Advanced care planning was discussed with patient and family.  Advanced care planning forms were reviewed and discussed.  Risks, benefits and alternatives of cardiac procedures were discussed in detail and all questions were answered. 35 minutes spent on total encounter of which more than fifty percent of the encounter was spent counseling and/or coordinating care by the attending physician.
I reviewed the overnight course of events on the unit, re-confirming the patient history. I discussed the care with the patient and their family. The plan of care was discussed with the ACP team and modifications were made to the notation where appropriate. Differential diagnosis and plan of care discussed with patient after the evaluation. Advanced care planning was discussed with patient and family.  Advanced care planning forms were reviewed and discussed.  Risks, benefits and alternatives of cardiac procedures were discussed in detail and all questions were answered. 35 minutes spent on total encounter of which more than fifty percent of the encounter was spent counseling and/or coordinating care by the attending physician.
time spent reviewing prior charts, meds, discussing plan with patient= 80 min     d/w Medicine ACP Charis
time spent reviewing prior charts, meds, discussing plan with patient= 50 minutes    d/w ACP Flor
time spent reviewing prior charts, meds, discussing plan with patient= 70 min     d/w Medicine ACP Shakira
DVT PPx: SCDs only given GI bleed  Diet: Pureed, thickened liquids  Dispo: pending improvement with diuresis, GOC discussions with family
time spent reviewing prior charts, meds, discussing plan with patient= 60 min     d/w Medicine ACP Flor
These recommendations are preliminary. Please see attending attestation for final recommendations.
DVT PPx: SCDs only given GI bleed  Diet: Pureed, thickened liquids  Dispo: pending improvement with diuresis, GOC discussions with family

## 2024-08-20 NOTE — PROGRESS NOTE ADULT - PROBLEM SELECTOR PLAN 1
Patient with persistent UGIB with coffee ground emesis and melanotic stools multiple times requiring IR embolization initially and then EGD with clipping for duodenal ulcer.    Hemorrhagic shock seems to have temporarily resolved. Hgb seems to be stable as well.   CTAP 8/4 shows no active bleeding, CTAP 8/8 no strong indication of active bleeding.  Management per primary team pt with difficulty managing secretions  started 0.4mg IVP Glycopyrrolate q6 hours prn for excessive audible secretions   o2 for comfort as goals have transitioned to comfort measures only

## 2024-08-20 NOTE — PROGRESS NOTE ADULT - ATTENDING COMMENTS
80yo M PMH metastatic neuroendocrine pancreatic cancer, hx of CAD s/p PCI x3 with stents placed on 6/12/24, chronic afib, orthostatic hypotension on midodrine, and PAD who presented 7/2 with hypotension found to have UGIB c/b hemorrhagic shock requiring MICU for pressor support with course complicated by C Diff, afib RVR, and RAZIA iso hypotension, transferred to floors with intermittent melena and drop in Hb with no acute surgical nor GI intervention recommended, now with sepsis concern for aspiration pna vs abdominal source on empiric zosyn, guarded prognosis now with hypernatremia, razia, transaminitis, GOC discussions ongoing family meeting planned for this afternoon.    1. Severe Sepsis: fevers 8/18 and now bandemia 14%!  RVP negative.  CXR appears improved from prior.  Tachy/hypotensive.  S/p 250 albumin x 2 overnight.  Fluid responsive yesterday  Suspect 2/2 aspiration PNA, given clinical presentation of poor secretion clearance, toxic metabolic encephalopathy, wet gurgly cough.  However also has abdominal pain, with bladder scan showing 200cc.  Did have UTI earlier during admission and completed course with cefepime.  On exam, he does NOT tolerate suctioning and fights it due to discomfort  -f/u abdominal xray to r/o free air, doubtful as abdomen remains tender but soft (no rigidity)  -change zosyn to cefepime, discussed with clinical pharmacist Barry Browning, will give reduced dose 2gm q12 given RAZIA  -f/u blood cultures  -f/u urine studies  -NPO  - chest vest to help clear secretions    2. trace apical left PTX: read on cxr 8/18.  F/u repeat CXR from this AM for progression.  NRB to help with resorption.  Seems hypotension is fluid responsive and more likely related to infectious source than ptx.    3. Hypernatremia: due to npo.  Start 1/2NS@ 50cc and trend daily    4. RAZIA: suspect ATN from hypotension yesterday.  Trend daily, renally dose meds    5. transaminitis: suspect from hypotension.  Trend daily    6. Hemorrhagic shock: Patient with persistent UGIB with coffee ground emesis and melanotic stools multiple times requiring IR embolization initially and then EGD with clipping for duodenal ulcer. Patient has been off of his aspirin and plavix for his very recent stent and Eliquis for his chronic afib since he has required so many repeat transfusions due to this active blood loss.    - Hold all AC and anti-platelet agents  - Protonix BID per GI  - Transfuse prn Hgb <8    7. Chronic atrial fibrillation: unable to be on AC for this and seemingly minimally responsive to amiodarone. Cardiology following, no obvious underlying cause aside from hemorrhage, which is difficult to control.   - Continue digoxin per cardiology-change to iv while npo. Per discussion with pharmacy will change to every other day dosing  - Holding eliquis.    8. CAD: multiple stents done as recently as 6/2024 but unable to be on DAPT due to persistent GIB.  - At risk for stent closure, but difficult to manage due to persistent GIB and recent hemorrhagic shock with multiple events requiring frequent transfusions in just the past several weeks.    9. Neuroendocrine carcinoma: Patient with known pancreatic neuroendocrine tumor and was on lanreotide. Was later on radiotherapy but due to multiple hospitalizations, was unable to receive follow-up doses. Patient did have PET done in May that showed lesions suspicious for mets. CT also in May showed increased hepatic metastases.   - Continue octreotide 250mcg TID.    10. GOC:  Palliative family meeting planned for today. Per patient he lives with wife, has no HHAs, prior to hospitalization walked ~1 block.  Gets most yennifer from seeing his grandkids on the weekends.  Family meeting scheduled this afternoon.  Would benefit from pcu vs home hospice if family agreeable    11. Anasarca  -patient anasarca, suspect combination of volume overload from transfusions and low albumin.   -holding diuresis 2/2 low BP.    12. DM2: controlled on slide scale    13. cdiff: s/p oral vanc     contact: TEAMS.

## 2024-08-20 NOTE — PROGRESS NOTE ADULT - NUTRITIONAL ASSESSMENT
This patient has been assessed with a concern for Malnutrition and has been determined to have a diagnosis/diagnoses of Severe protein-calorie malnutrition.    This patient is being managed with:   Diet NPO with Tube Feed-  Tube Feeding Modality: Nasogastric  Vital 1.5 Sebastian (VITAL1.5RTH)  Total Volume for 24 Hours (mL): 1560  Continuous  Starting Tube Feed Rate {mL per Hour}: 10  Increase Tube Feed Rate by (mL): 5     Every hour  Until Goal Tube Feed Rate (mL per Hour): 65  Tube Feed Duration (in Hours): 24  Tube Feed Start Time: 12:00  No Carb Prosource TF     Qty per Day:  1  Supplement Feeding Modality:  Nasogastric  Entered: Jul 26 2024 10:46AM  
This patient has been assessed with a concern for Malnutrition and has been determined to have a diagnosis/diagnoses of Severe protein-calorie malnutrition.    This patient is being managed with:   Diet NPO with Tube Feed-  Tube Feeding Modality: Nasogastric  Vital 1.5 Sebastian (VITAL1.5RTH)  Total Volume for 24 Hours (mL): 1560  Continuous  Starting Tube Feed Rate {mL per Hour}: 10  Increase Tube Feed Rate by (mL): 5     Every hour  Until Goal Tube Feed Rate (mL per Hour): 65  Tube Feed Duration (in Hours): 24  Tube Feed Start Time: 12:00  No Carb Prosource TF     Qty per Day:  1  Supplement Feeding Modality:  Nasogastric  Entered: Jul 26 2024 10:46AM  
This patient has been assessed with a concern for Malnutrition and has been determined to have a diagnosis/diagnoses of Severe protein-calorie malnutrition.    This patient is being managed with:   Diet NPO-  Entered: Jul 31 2024  5:54PM  
This patient has been assessed with a concern for Malnutrition and has been determined to have a diagnosis/diagnoses of Severe protein-calorie malnutrition.    This patient is being managed with:   Diet NPO-  Entered: Jul 31 2024  5:54PM  
This patient has been assessed with a concern for Malnutrition and has been determined to have a diagnosis/diagnoses of Severe protein-calorie malnutrition.    This patient is being managed with:   Diet NPO-  Except Medications  Entered: Jul 18 2024  4:40PM  
This patient has been assessed with a concern for Malnutrition and has been determined to have a diagnosis/diagnoses of Severe protein-calorie malnutrition.    This patient is being managed with:   Diet NPO-  Except Medications  Entered: Jul 18 2024  4:40PM  
This patient has been assessed with a concern for Malnutrition and has been determined to have a diagnosis/diagnoses of Severe protein-calorie malnutrition.    This patient is being managed with:   Diet NPO with Tube Feed-  Tube Feeding Modality: Nasogastric  Ensure Clear  Total Volume for 24 Hours (mL): 1170  Continuous  Starting Tube Feed Rate {mL per Hour}: 10  Increase Tube Feed Rate by (mL): 10     Every 6 hours  Until Goal Tube Feed Rate (mL per Hour): 65  Tube Feed Duration (in Hours): 18  Tube Feed Start Time: 11:00  Tube Feed Stop Time: 05:00  Entered: Jul 16 2024  2:53AM  
This patient has been assessed with a concern for Malnutrition and has been determined to have a diagnosis/diagnoses of Severe protein-calorie malnutrition.    This patient is being managed with:   Diet NPO with Tube Feed-  Tube Feeding Modality: Nasogastric  Ensure Clear  Total Volume for 24 Hours (mL): 1170  Continuous  Starting Tube Feed Rate {mL per Hour}: 10  Increase Tube Feed Rate by (mL): 10     Every 6 hours  Until Goal Tube Feed Rate (mL per Hour): 65  Tube Feed Duration (in Hours): 18  Tube Feed Start Time: 11:00  Tube Feed Stop Time: 05:00  Entered: Jul 16 2024  2:53AM  
This patient has been assessed with a concern for Malnutrition and has been determined to have a diagnosis/diagnoses of Severe protein-calorie malnutrition.    This patient is being managed with:   Diet NPO with Tube Feed-  Tube Feeding Modality: Nasogastric  Vital 1.5 Sebastian (VITAL1.5RTH)  Total Volume for 24 Hours (mL): 1560  Continuous  Starting Tube Feed Rate {mL per Hour}: 10  Increase Tube Feed Rate by (mL): 5     Every hour  Until Goal Tube Feed Rate (mL per Hour): 65  Tube Feed Duration (in Hours): 24  Tube Feed Start Time: 12:00  No Carb Prosource TF     Qty per Day:  1  Supplement Feeding Modality:  Nasogastric  Entered: Jul 26 2024 10:46AM  
This patient has been assessed with a concern for Malnutrition and has been determined to have a diagnosis/diagnoses of Severe protein-calorie malnutrition.    This patient is being managed with:   Diet NPO-  Entered: Jul 31 2024  5:54PM  
This patient has been assessed with a concern for Malnutrition and has been determined to have a diagnosis/diagnoses of Severe protein-calorie malnutrition.    This patient is being managed with:   Diet NPO-  Entered: Jul 8 2024 11:50PM  
This patient has been assessed with a concern for Malnutrition and has been determined to have a diagnosis/diagnoses of Severe protein-calorie malnutrition.    This patient is being managed with:   Diet NPO-  Entered: Jul 9 2024  5:27PM  
This patient has been assessed with a concern for Malnutrition and has been determined to have a diagnosis/diagnoses of Severe protein-calorie malnutrition.    This patient is being managed with:   Diet NPO-  Entered: Jul 9 2024  5:27PM  
This patient has been assessed with a concern for Malnutrition and has been determined to have a diagnosis/diagnoses of Severe protein-calorie malnutrition.    This patient is being managed with:   Diet NPO with Tube Feed-  Supplement Feeding Modality:  Orogastric  Ensure Clear Cans or Servings Per Day:  2       Frequency:  Two Times a day  Entered: Jul 13 2024  3:02PM    The following pending diet order is being considered for treatment of Severe protein-calorie malnutrition:  Diet NPO with Tube Feed-  Tube Feeding Modality: Orogastric  Ensure Clear  Total Volume for 24 Hours (mL): 1170  Continuous  Starting Tube Feed Rate {mL per Hour}: 10  Increase Tube Feed Rate by (mL): 10     Every 6 hours  Until Goal Tube Feed Rate (mL per Hour): 65  Tube Feed Duration (in Hours): 18  Tube Feed Start Time: 11:00  Entered: Jul 13 2024  3:24PM  
This patient has been assessed with a concern for Malnutrition and has been determined to have a diagnosis/diagnoses of Severe protein-calorie malnutrition.    This patient is being managed with:   Diet NPO with Tube Feed-  Tube Feeding Modality: Nasogastric  Jevity 1.5 Sebastian (JEVITY1.5RTH)  Total Volume for 24 Hours (mL): 180  Continuous  Starting Tube Feed Rate {mL per Hour}: 10  Until Goal Tube Feed Rate (mL per Hour): 10  Tube Feed Duration (in Hours): 18  Tube Feed Start Time: 12:00  Entered: Jul 24 2024 11:40AM  
This patient has been assessed with a concern for Malnutrition and has been determined to have a diagnosis/diagnoses of Severe protein-calorie malnutrition.    This patient is being managed with:   Diet NPO with Tube Feed-  Tube Feeding Modality: Nasogastric  Vital 1.5 Sebastian (VITAL1.5RTH)  Total Volume for 24 Hours (mL): 1560  Continuous  Starting Tube Feed Rate {mL per Hour}: 10  Increase Tube Feed Rate by (mL): 5     Every hour  Until Goal Tube Feed Rate (mL per Hour): 65  Tube Feed Duration (in Hours): 24  Tube Feed Start Time: 12:00  No Carb Prosource TF     Qty per Day:  1  Supplement Feeding Modality:  Nasogastric  Entered: Jul 26 2024 10:46AM  
This patient has been assessed with a concern for Malnutrition and has been determined to have a diagnosis/diagnoses of Severe protein-calorie malnutrition.    This patient is being managed with:   Diet NPO-  Entered: Jul 9 2024  5:27PM  
This patient has been assessed with a concern for Malnutrition and has been determined to have a diagnosis/diagnoses of Severe protein-calorie malnutrition.    This patient is being managed with:   Diet NPO-  Except Medications  Entered: Jul 18 2024  4:40PM  
This patient has been assessed with a concern for Malnutrition and has been determined to have a diagnosis/diagnoses of Severe protein-calorie malnutrition.    This patient is being managed with:   Diet Pureed-  Mildly Thick Liquids (MILDTHICKLIQS)  Liquid Protein Supplement     Qty per Day:  1  Supplement Feeding Modality:  Oral  Ensure Clear Cans or Servings Per Day:  1       Frequency:  Daily  Ensure Plus High Protein Cans or Servings Per Day:  1       Frequency:  Daily  Entered: Aug 16 2024  3:11PM  
This patient has been assessed with a concern for Malnutrition and has been determined to have a diagnosis/diagnoses of Severe protein-calorie malnutrition.    This patient is being managed with:   Diet NPO with Tube Feed-  Supplement Feeding Modality:  Orogastric  Ensure Clear Cans or Servings Per Day:  2       Frequency:  Two Times a day  Entered: Jul 13 2024  3:02PM    The following pending diet order is being considered for treatment of Severe protein-calorie malnutrition:  Diet NPO with Tube Feed-  Tube Feeding Modality: Orogastric  Ensure Clear  Total Volume for 24 Hours (mL): 1170  Continuous  Starting Tube Feed Rate {mL per Hour}: 10  Increase Tube Feed Rate by (mL): 10     Every 6 hours  Until Goal Tube Feed Rate (mL per Hour): 65  Tube Feed Duration (in Hours): 18  Tube Feed Start Time: 11:00  Entered: Jul 13 2024  3:24PM  
This patient has been assessed with a concern for Malnutrition and has been determined to have a diagnosis/diagnoses of Severe protein-calorie malnutrition.    This patient is being managed with:   Diet NPO with Tube Feed-  Tube Feeding Modality: Nasogastric  Ensure Clear  Total Volume for 24 Hours (mL): 1170  Continuous  Starting Tube Feed Rate {mL per Hour}: 10  Increase Tube Feed Rate by (mL): 10     Every 6 hours  Until Goal Tube Feed Rate (mL per Hour): 65  Tube Feed Duration (in Hours): 18  Tube Feed Start Time: 11:00  Tube Feed Stop Time: 05:00  Entered: Jul 16 2024  2:53AM  
This patient has been assessed with a concern for Malnutrition and has been determined to have a diagnosis/diagnoses of Severe protein-calorie malnutrition.    This patient is being managed with:   Diet NPO with Tube Feed-  Tube Feeding Modality: Nasogastric  Jevity 1.5 Sebastian (JEVITY1.5RTH)  Total Volume for 24 Hours (mL): 180  Continuous  Starting Tube Feed Rate {mL per Hour}: 10  Until Goal Tube Feed Rate (mL per Hour): 10  Tube Feed Duration (in Hours): 18  Tube Feed Start Time: 12:00  Entered: Jul 24 2024 11:40AM  
This patient has been assessed with a concern for Malnutrition and has been determined to have a diagnosis/diagnoses of Severe protein-calorie malnutrition.    This patient is being managed with:   Diet NPO with Tube Feed-  Tube Feeding Modality: Nasogastric  Vital 1.5 Sebastian (VITAL1.5RTH)  Total Volume for 24 Hours (mL): 1530  Continuous  Starting Tube Feed Rate {mL per Hour}: 30  Increase Tube Feed Rate by (mL): 10     Every 6 hours  Until Goal Tube Feed Rate (mL per Hour): 85  Tube Feed Duration (in Hours): 18  Tube Feed Start Time: 14:00  No Carb Prosource TF     Qty per Day:  1  Entered: Jul 17 2024  2:09PM  
This patient has been assessed with a concern for Malnutrition and has been determined to have a diagnosis/diagnoses of Severe protein-calorie malnutrition.    This patient is being managed with:   Diet NPO with Tube Feed-  Tube Feeding Modality: Orogastric  Ensure Clear  Total Volume for 24 Hours (mL): 1170  Continuous  Starting Tube Feed Rate {mL per Hour}: 10  Increase Tube Feed Rate by (mL): 10     Every 6 hours  Until Goal Tube Feed Rate (mL per Hour): 65  Tube Feed Duration (in Hours): 18  Tube Feed Start Time: 11:00  Tube Feed Stop Time: 05:00  Entered: Jul 14 2024 12:07PM  
This patient has been assessed with a concern for Malnutrition and has been determined to have a diagnosis/diagnoses of Severe protein-calorie malnutrition.    This patient is being managed with:   Diet NPO-  Entered: Jul 31 2024  5:54PM  
This patient has been assessed with a concern for Malnutrition and has been determined to have a diagnosis/diagnoses of Severe protein-calorie malnutrition.    This patient is being managed with:   Diet NPO-  Except Medications  Entered: Jul 18 2024  4:40PM  
This patient has been assessed with a concern for Malnutrition and has been determined to have a diagnosis/diagnoses of Severe protein-calorie malnutrition.    This patient is being managed with:   Diet Regular-  Entered: Jul 5 2024  1:13PM  
MEDICATIONS  (STANDING):  atorvastatin 80 milliGRAM(s) Oral at bedtime  chlorhexidine 0.12% Liquid 15 milliLiter(s) Oral Mucosa every 12 hours  chlorhexidine 2% Cloths 1 Application(s) Topical <User Schedule>  clopidogrel Tablet 75 milliGRAM(s) Enteral Tube daily  dexMEDEtomidine Infusion 0.3 MICROgram(s)/kG/Hr (7.4 mL/Hr) IV Continuous <Continuous>  droxidopa 100 milliGRAM(s) Oral three times a day  folic acid Injectable 1 milliGRAM(s) IV Push daily  midodrine 20 milliGRAM(s) Oral every 8 hours  pantoprazole  Injectable 40 milliGRAM(s) IV Push every 12 hours  phenylephrine    Infusion 0.25 MICROgram(s)/kG/Min (9.25 mL/Hr) IV Continuous <Continuous>  piperacillin/tazobactam IVPB.. 3.375 Gram(s) IV Intermittent every 12 hours  propofol Infusion 10 MICROgram(s)/kG/Min (5.92 mL/Hr) IV Continuous <Continuous>  thiamine Injectable 100 milliGRAM(s) IV Push daily  vancomycin    Solution 125 milliGRAM(s) Oral every 6 hours  vasopressin Infusion 0.04 Unit(s)/Min (6 mL/Hr) IV Continuous <Continuous>
This patient has been assessed with a concern for Malnutrition and has been determined to have a diagnosis/diagnoses of Severe protein-calorie malnutrition.    This patient is being managed with:   Diet NPO with Tube Feed-  Tube Feeding Modality: Nasogastric  Ensure Clear  Total Volume for 24 Hours (mL): 1170  Continuous  Starting Tube Feed Rate {mL per Hour}: 10  Increase Tube Feed Rate by (mL): 10     Every 6 hours  Until Goal Tube Feed Rate (mL per Hour): 65  Tube Feed Duration (in Hours): 18  Tube Feed Start Time: 11:00  Tube Feed Stop Time: 05:00  Entered: Jul 16 2024  2:53AM  
This patient has been assessed with a concern for Malnutrition and has been determined to have a diagnosis/diagnoses of Severe protein-calorie malnutrition.    This patient is being managed with:   Diet NPO with Tube Feed-  Tube Feeding Modality: Nasogastric  Vital 1.5 Sebastian (VITAL1.5RTH)  Total Volume for 24 Hours (mL): 1560  Continuous  Starting Tube Feed Rate {mL per Hour}: 10  Increase Tube Feed Rate by (mL): 5     Every hour  Until Goal Tube Feed Rate (mL per Hour): 65  Tube Feed Duration (in Hours): 24  Tube Feed Start Time: 12:00  No Carb Prosource TF     Qty per Day:  1  Supplement Feeding Modality:  Nasogastric  Entered: Jul 26 2024 10:46AM  
This patient has been assessed with a concern for Malnutrition and has been determined to have a diagnosis/diagnoses of Severe protein-calorie malnutrition.    This patient is being managed with:   Diet NPO with Tube Feed-  Tube Feeding Modality: Nasogastric  Vital 1.5 Sebastian (VITAL1.5RTH)  Total Volume for 24 Hours (mL): 1560  Continuous  Starting Tube Feed Rate {mL per Hour}: 10  Increase Tube Feed Rate by (mL): 5     Every hour  Until Goal Tube Feed Rate (mL per Hour): 65  Tube Feed Duration (in Hours): 24  Tube Feed Start Time: 12:00  No Carb Prosource TF     Qty per Day:  1  Supplement Feeding Modality:  Nasogastric  Entered: Jul 26 2024 10:46AM  
This patient has been assessed with a concern for Malnutrition and has been determined to have a diagnosis/diagnoses of Severe protein-calorie malnutrition.    This patient is being managed with:   Diet NPO-  Entered: Jul 9 2024  5:27PM  
This patient has been assessed with a concern for Malnutrition and has been determined to have a diagnosis/diagnoses of Severe protein-calorie malnutrition.    This patient is being managed with:   Diet NPO-  Except Medications  Entered: Jul 18 2024  4:40PM  
This patient has been assessed with a concern for Malnutrition and has been determined to have a diagnosis/diagnoses of Severe protein-calorie malnutrition.    This patient is being managed with:   Diet NPO-  Except Medications  Entered: Jul 18 2024  4:40PM  
This patient has been assessed with a concern for Malnutrition and has been determined to have a diagnosis/diagnoses of Severe protein-calorie malnutrition.    This patient is being managed with:   Diet NPO-  Entered: Aug 19 2024  9:17AM  
This patient has been assessed with a concern for Malnutrition and has been determined to have a diagnosis/diagnoses of Severe protein-calorie malnutrition.    This patient is being managed with:   Diet Pureed-  Mildly Thick Liquids (MILDTHICKLIQS)  Entered: Aug  6 2024  4:27PM  
This patient has been assessed with a concern for Malnutrition and has been determined to have a diagnosis/diagnoses of Severe protein-calorie malnutrition.    This patient is being managed with:   Diet Regular-  Entered: Jul 5 2024  1:13PM  
This patient has been assessed with a concern for Malnutrition and has been determined to have a diagnosis/diagnoses of Severe protein-calorie malnutrition.    This patient is being managed with:   Diet NPO-  Entered: Jul 31 2024  5:54PM  
This patient has been assessed with a concern for Malnutrition and has been determined to have a diagnosis/diagnoses of Severe protein-calorie malnutrition.    This patient is being managed with:   Diet Pureed-  Mildly Thick Liquids (MILDTHICKLIQS)  Entered: Aug  6 2024  4:27PM  
This patient has been assessed with a concern for Malnutrition and has been determined to have a diagnosis/diagnoses of Severe protein-calorie malnutrition.    This patient is being managed with:   Diet Regular-  Entered: Jul 5 2024  1:13PM  
This patient has been assessed with a concern for Malnutrition and has been determined to have a diagnosis/diagnoses of Severe protein-calorie malnutrition.    This patient is being managed with:   Diet Pureed-  Mildly Thick Liquids (MILDTHICKLIQS)  Entered: Aug  6 2024  4:27PM  
This patient has been assessed with a concern for Malnutrition and has been determined to have a diagnosis/diagnoses of Severe protein-calorie malnutrition.    This patient is being managed with:   Diet NPO-  Entered: Aug 19 2024  9:17AM  
This patient has been assessed with a concern for Malnutrition and has been determined to have a diagnosis/diagnoses of Severe protein-calorie malnutrition.    This patient is being managed with:   Diet Pureed-  Mildly Thick Liquids (MILDTHICKLIQS)  Entered: Aug  6 2024  4:27PM  
This patient has been assessed with a concern for Malnutrition and has been determined to have a diagnosis/diagnoses of Severe protein-calorie malnutrition.    This patient is being managed with:   Diet NPO-  Entered: Jul 31 2024  5:54PM  
This patient has been assessed with a concern for Malnutrition and has been determined to have a diagnosis/diagnoses of Severe protein-calorie malnutrition.    This patient is being managed with:   Diet NPO-  Entered: Jul 31 2024  5:54PM  
This patient has been assessed with a concern for Malnutrition and has been determined to have a diagnosis/diagnoses of Severe protein-calorie malnutrition.    This patient is being managed with:   Diet Pureed-  Mildly Thick Liquids (MILDTHICKLIQS)  Entered: Aug  6 2024  4:27PM  
This patient has been assessed with a concern for Malnutrition and has been determined to have a diagnosis/diagnoses of Severe protein-calorie malnutrition.    This patient is being managed with:   Diet Pureed-  Mildly Thick Liquids (MILDTHICKLIQS)  Liquid Protein Supplement     Qty per Day:  1  Supplement Feeding Modality:  Oral  Ensure Clear Cans or Servings Per Day:  1       Frequency:  Daily  Ensure Plus High Protein Cans or Servings Per Day:  1       Frequency:  Daily  Entered: Aug 16 2024  3:11PM  
This patient has been assessed with a concern for Malnutrition and has been determined to have a diagnosis/diagnoses of Severe protein-calorie malnutrition.    This patient is being managed with:   Diet Pureed-  Mildly Thick Liquids (MILDTHICKLIQS)  Supplement Feeding Modality:  Oral  Ensure Enlive Cans or Servings Per Day:  1       Frequency:  Three Times a day  Entered: Aug 15 2024  4:00PM  
This patient has been assessed with a concern for Malnutrition and has been determined to have a diagnosis/diagnoses of Severe protein-calorie malnutrition.    This patient is being managed with:   Diet Pureed-  Mildly Thick Liquids (MILDTHICKLIQS)  Entered: Aug  6 2024  4:27PM

## 2024-08-20 NOTE — PROGRESS NOTE ADULT - PROBLEM SELECTOR PLAN 7
Worse today    -D5W @ 50cc/hr x24 hrs  -Change antibiotics to cefepime, zosyn has large salt load Worse today    -1/22 NS @ 50cc/hr x24 hrs  -Change antibiotics to cefepime, zosyn has large salt load  -Repeat BMP later today

## 2024-08-20 NOTE — PROGRESS NOTE ADULT - PROBLEM SELECTOR PLAN 2
likely ATN due to sepsis/hypotension    -Bladder scan x1  -Avoid nephrotoxins  -Check dig level in AM likely ATN due to sepsis/hypotension    -Bladder scan x1  -Avoid nephrotoxins  -Check dig level in AM  -Holding dig for now

## 2024-08-20 NOTE — DISCHARGE NOTE FOR THE EXPIRED PATIENT - HOSPITAL COURSE
80 yo male with metastatic neuroendocrine pancreatic cancer (tx on hold) and PMH of CAD s/p PCI x3 (recent stent 6/12/24) on Plavix and eliquis, chronic Afib on eliquis, orthostatic hypotension on midodrine, PAD presented to ED on 7/2 from PCP's office for persistent hypotension and admitted to medicine for symptomatic hypotension and RAZIA. While initially on floors , pt was noted to have acute onset of melena x5 and coffee ground emesis x2-3 (7/8). RRT was called on 7/9 AM for hypotension and hgb drop from 9.2 to 7.4 (admission baseline 10-11), and 1 unit pRBC given. GI was consulted and patient underwent EGD on 7/9 with uncontrolled bleeding and hypotension requiring pressors. IR performed GDA embolization, and patient was admitted to MICU for further monitoring i.s.o hemorrhagic shock 2/2 GI bleed. On repeat EGD, oozing but no active bleeding was noted. However, patient had another melanotic stool on 7/18 with Hgb drop (10.1 to 7.1), requiring multiple units of pRBCs. On this EGD, GI found duodenal bleed, intervening with 3x clips and epi. Eventually, patient able to be weaned off pressors and stable for floors.    On the floors, patient continued to have intermittent melanotic stools with hemoglobin drops, but had multiple CTAPs with no active bleeds. Per GI and surgery, no acute interventions recommended. Patient has remained stable for the past several days***. Patient received total transfusions of 22u pRBCs during this admission.     Hospital course c/b multiple infections including C. diff (Vancomycin 7/14-7/25), Klebsiella/Raoultella orthinolytica ET tube (Zosyn 7/11-7/18, Meropenem 7/18-7/25), carbapenem-resistent Pseudomonas sputum (cefepime 8/2-8/8), and Klebsiella UTI (cefepime 8/2-8/8).     Hospital course c/b afib with RVR into 150s. Patient started on amiodarone and digoxin in the MICU and transitioned to digoxin on the floors with good response. Cardiology following throughout admission.    Hospital course c/b anasarca secondary to significant volume given during admission. Patient given albumin and bumex pushes with appropriate response.    Goals of care discussions were held throughout admission. Family decided that they would like to pursue comfort care measures. Provider was notified for hypotension 70/40, 1L fluid bolus was administered and patient ceased spontaneous respiration with asystole. Pt pronounced dead at 09:47PM on 08/20/2024. Cause of death is cardiopulmonary arrest 2/2 acute hypoxemic respiratory failure 2/2 aspiration pneumonia    Grand Total RBCs: 22 units received (incl. 1x Washed RBC)   Grand Total Plt: 5u received  Grand Total Plasma: 5u received   78 yo male with metastatic neuroendocrine pancreatic cancer (tx on hold) and PMH of CAD s/p PCI x3 (recent stent 6/12/24) on Plavix and eliquis, chronic Afib on eliquis, orthostatic hypotension on midodrine, PAD presented to ED on 7/2 from PCP's office for persistent hypotension and admitted to medicine for symptomatic hypotension and RAZIA. While initially on floors , pt was noted to have acute onset of melena x5 and coffee ground emesis x2-3 (7/8). RRT was called on 7/9 AM for hypotension and hgb drop from 9.2 to 7.4 (admission baseline 10-11), and 1 unit pRBC given. GI was consulted and patient underwent EGD on 7/9 with uncontrolled bleeding and hypotension requiring pressors. IR performed GDA embolization, and patient was admitted to MICU for further monitoring i.s.o hemorrhagic shock 2/2 GI bleed. On repeat EGD, oozing but no active bleeding was noted. However, patient had another melanotic stool on 7/18 with Hgb drop (10.1 to 7.1), requiring multiple units of pRBCs. On this EGD, GI found duodenal bleed, intervening with 3x clips and epi. Eventually, patient able to be weaned off pressors and stable for floors.    On the floors, patient continued to have intermittent melanotic stools with hemoglobin drops, but had multiple CTAPs with no active bleeds. Per GI and surgery, no acute interventions recommended. Patient has remained stable for the past several days***. Patient received total transfusions of 22u pRBCs during this admission.     Hospital course c/b multiple infections including C. diff (Vancomycin 7/14-7/25), Klebsiella/Raoultella orthinolytica ET tube (Zosyn 7/11-7/18, Meropenem 7/18-7/25), carbapenem-resistent Pseudomonas sputum (cefepime 8/2-8/8), and Klebsiella UTI (cefepime 8/2-8/8).     Hospital course c/b afib with RVR into 150s. Patient started on amiodarone and digoxin in the MICU and transitioned to digoxin on the floors with good response. Cardiology following throughout admission.    Hospital course c/b anasarca secondary to significant volume given during admission. Patient given albumin and bumex pushes with appropriate response.    Goals of care discussions were held throughout admission. Family decided that they would like to pursue comfort care measures. Provider was notified for hypotension 70/40, 1L fluid bolus was administered and patient ceased spontaneous respiration with asystole. Pt pronounced dead at 09:47PM on 08/20/2024. Cause of death is respiratory arrest 2/2 aspiration pneumonia 2/2 metabolic encephalopathy 2/2 pancreatic neuroendocrine tumor    Grand Total RBCs: 22 units received (incl. 1x Washed RBC)   Grand Total Plt: 5u received  Grand Total Plasma: 5u received

## 2024-08-20 NOTE — PROGRESS NOTE ADULT - PROBLEM SELECTOR PLAN 5
Afebrile.   UCx 8/2 showing Klebsiella and sensitivity to cefepime.  Recent blood cultures showing no growth.  - S/p cefepime 2g q8 for 7d (8/2-8/8) see goc note above  DNR/I  comfort measures only  pending transfer to PCU when bed is available  surrogate is pt's wife Yamilet

## 2024-08-20 NOTE — PROGRESS NOTE ADULT - PROBLEM SELECTOR PLAN 9
Patient with persistent UGIB with coffee ground emesis and melanotic stools multiple times requiring IR GDA embolization initially and then EGD s/p 3 clips for duodenal ulcer. Patient has been off of his aspirin and plavix for his very recent stent and Eliquis for his chronic afib since he has required so many repeat transfusions due to this active blood loss.     Plan:  - Continue midodrine 10mg q8 as necessary  - Protonix BID per GI; consider switching to PO if patient can tolerate pills  - Hold all AC and anti-platelet agents  - Continue CBC q24 unless having active bleeding, transfuse Hgb <8  - Maintain T&S q48 hours Now with Afib with RVR in the setting of sepsis.    Plan:  -Dig 125mcg IV daily   -Metoprolol 5mg q6 standing while NPO  -Metoprolol 5mg PRN for rates >140  -No AC due to GIB Now with Afib with RVR in the setting of sepsis.    Plan:  -Dig on hold due to RAZIA, will need reduced dosing. f/u level in AM  -Metoprolol 5mg q6 standing while NPO  -Metoprolol 5mg PRN for rates >140  -No AC due to GIB

## 2024-08-20 NOTE — PROGRESS NOTE ADULT - NS_MD_PANP_GEN_ALL_CORE

## 2024-08-20 NOTE — PROGRESS NOTE ADULT - PROBLEM SELECTOR PLAN 5
Previously evaluated by speech therapy  -MBS on 8/6 recommended puree with thickened liquids but patient is grossly aspirating with very weak cough and now with likely recurrent PNA  -NPO pending GOC discussion with family tomorrow at 3pm Previously evaluated by speech therapy  -MBS on 8/6 recommended puree with thickened liquids but patient is grossly aspirating with very weak cough and now with likely recurrent PNA  -NPO pending GOC discussion with family today at 3pm

## 2024-08-20 NOTE — PROGRESS NOTE ADULT - PROBLEM SELECTOR PLAN 3
Patient's profound edema likely 2/2 large volume in during this admission as well as malnutrition.  appreciate plan:   - Consider further diuresis when Hgb >8 and not currently transfusing  - Strict I/Os. discussed with outpt onc Dr. Prasad: no further DMT due to severe debility   - Continue octreotide 250mcg TID

## 2024-08-20 NOTE — PROGRESS NOTE ADULT - ASSESSMENT
Impression:    Left upper back wound  Left buttock stage 2 pressure injury  Incontinence of bowel and bladder  Incontinence Dermatitis    Recommend:  1.) topical therapy: Left upper back wound – cleanse with NS, pat dry, apply Medihoney paste, cover with an allevyn foam every other day  Sacrum/bilateral buttocks skin - cleanse with incontinence cleanser, pat dry, apply Triad ointment BID and PRN for incontinent episodes  2.) Incontinence Management - incontinence cleanser, pads, pericare BID  3.) Maintain on an alternating air with low air loss surface  4.) Turn and reposition Q 2 hours  5.) Nutrition optimization - please add Cordell  6.) Offload heels/feet with complete cair air fluidized boots/pillows; ensure that the soles of the feet are not resting on the foot board of the bed.  7.) chair cushion for chair sitting    Care as per medicine. Will not actively follow but will remain available. Please recall for new issues or deterioration.  Upon [facility] [address] [place] [address] [phone]  Thank you for this consult  Katt Ma, ESTEFANI-C, CWOCN via TEAMS   Impression:    Right upper back wound  Left buttock stage 2 pressure injury  Incontinence of bowel and bladder  Incontinence Dermatitis    Recommend:  1.) topical therapy: Right upper back wound – cleanse with NS, pat dry, apply Medihoney paste, cover with an allevyn foam every other day  Sacrum/bilateral buttocks skin - cleanse with incontinence cleanser, pat dry, apply Triad ointment BID and PRN for incontinent episodes  2.) Incontinence Management - incontinence cleanser, pads, pericare BID  3.) Maintain on an alternating air with low air loss surface  4.) Turn and reposition Q 2 hours  5.) Nutrition optimization - please add Cordell  6.) Offload heels/feet with complete cair air fluidized boots/pillows; ensure that the soles of the feet are not resting on the foot board of the bed.  7.) chair cushion for chair sitting    Care as per medicine. Will not actively follow but will remain available. Please recall for new issues or deterioration.  Upon discharge f/u as outpatient at [facility] [address] [place] [address] [phone]  Thank you for this consult  Katt Ma, ESTEFANI-C, CWOCN via TEAMS

## 2024-08-20 NOTE — PROGRESS NOTE ADULT - NS ATTEND AMEND GEN_ALL_CORE FT
Patient seen and examined with PA,   I agree with the above assessment and plan    critically ill, no improvement despite aggressive measures  not a candidate for cancer directed now and possibly in the future as he failed to recover thus far  family meeting planned today, where private [facility] oncologist will discuss GOC from med Onc perspsective  appreciate palliative care follow up

## 2024-08-20 NOTE — PROGRESS NOTE ADULT - TIME BILLING
chart and data review, clinical assessment, and coordination of care. This excludes any time spent on separate procedures or teaching.
Symptom assessment and management, supportive counseling, coordination of care
- Ordering, reviewing, and/or interpreting labs, testing, and/or imaging  - Independently obtaining a review of systems and performing a physical exam  - Reviewing prior records and where necessary, outpatient records  - Counselling and educating patient and/or family regarding interpretation of aforementioned items and plan of care
Symptom assessment and management, supportive counseling, coordination of care
- Ordering, reviewing, and/or interpreting labs, testing, and/or imaging  - Independently obtaining a review of systems and performing a physical exam  - Reviewing prior records and where necessary, outpatient records  - Counselling and educating patient and/or family regarding interpretation of aforementioned items and plan of care
Symptom assessment and management, supportive counseling, coordination of care
Symptom assessment and management, supportive counseling, coordination of care
Time-based billing (NON-critical care).     The necessity of the time spent during the encounter on this date of service was due to:     - Ordering, reviewing, and interpreting labs, testing, and imaging.  - Independently obtaining a review of systems and performing a physical exam  - Reviewing prior hospitalization and where necessary, outpatient records.  - Counselling and educating patient and/or family regarding interpretation of aforementioned items and plan of care.
Time-based billing (NON-critical care).     The necessity of the time spent during the encounter on this date of service was due to:     - Ordering, reviewing, and interpreting labs, testing, and imaging.  - Independently obtaining a review of systems and performing a physical exam  - Reviewing prior hospitalization and where necessary, outpatient records.  - Counselling and educating patient and/or family regarding interpretation of aforementioned items and plan of care.
- Ordering, reviewing, and interpreting labs, testing, and imaging.  - Independently obtaining a review of systems and performing a physical exam  - Reviewing consultant documentation/recommendations.  - Counselling and educating patient regarding interpretation of aforementioned items and plan of care.
- Ordering, reviewing, and interpreting labs, testing, and imaging.  - Independently obtaining a review of systems and performing a physical exam  - Reviewing consultant documentation/recommendations.  - Counselling and educating patient regarding interpretation of aforementioned items and plan of care.
Time-based billing (NON-critical care).   The documented time excludes time spent with resident teaching activities or time spent by the resident in taking care of the patient.   The necessity of the time spent during the encounter on this date of service was due to:     - Ordering, reviewing, and interpreting labs, testing, and imaging.  - Independently obtaining a review of systems and performing a physical exam  - Reviewing prior hospitalization and where necessary, outpatient records.  - Counselling and educating patient and family regarding interpretation of aforementioned items and plan of care.
Symptom assessment and management, supportive counseling, coordination of care

## 2024-08-20 NOTE — PROGRESS NOTE ADULT - CONVERSATION DETAILS
Spoke with family in private room. Each team member introduced ourselves and our role. Discussed ongoing complications related to comorbidities. Discussed worsening secretions, refusing suctioning, ongoing need for transfusions, and limited interventions to try and resolve these issues. Discussed overall guarded prognosis in the setting of multiple complications. Provided oncology f/u from outpt onc who shared at this stage of illness no further DMT being offered.  Shared IDT concern that ICU admission and interventions such as CPR and intubation would not change the overall clinical picture. Discussed a comfort based approach to care prioritizing symptom management and pt needs over the lab values and vital signs. Yamilet shared the difficulty of the situation having been  for 54 years and he had walked into the hospital. Extensive emotional support provided. Yamilet is in agreement with a comfort based plan of care, DNR/I, and transfer to the PCU pending bed availability. Discussed initiating comfort measures on 4 Hector to which family was amenable.

## 2024-08-20 NOTE — PROGRESS NOTE ADULT - PROVIDER SPECIALTY LIST ADULT
Cardiology
Critical Care
Gastroenterology
Gastroenterology
Heme/Onc
Hospitalist
Hospitalist
Internal Medicine
Internal Medicine
MICU
Nephrology
Surgery
Cardiology
Critical Care
Gastroenterology
Geriatrics
Heme/Onc
Intervent Radiology
Intervent Radiology
MICU
Cardiology
Endocrinology
Heme/Onc
Internal Medicine
MICU
Nephrology
Surgery
Gastroenterology
Heme/Onc
Intervent Radiology
MICU
Nephrology
Surgery
Wound Care
Hospitalist
Internal Medicine
MICU
MICU
Palliative Care
Heme/Onc
Hospitalist
Hospitalist
Internal Medicine
Nephrology
Palliative Care
Internal Medicine
Hospitalist
Internal Medicine
Palliative Care
Internal Medicine
Palliative Care
Palliative Care
Internal Medicine
Palliative Care
Internal Medicine
Internal Medicine

## 2024-08-20 NOTE — PROGRESS NOTE ADULT - CONVERSATION DETAILS
Spoke with wife, 2 daughters and son with palliative care NP regarding his current condition and next step planning. He has AHRF, chronic aspiration, severe sepsis 2/2 PNA, RAZIA, transaminitis with ongoing slow GIB and metastatic neuroendocrine pancreatic CA.     Family wants to focus on quality of life and making him comfortable. Understands that his time may be limited to hours. He is DNR/DNI with symptom mgmt for dyspnea, pain and secretions. Transfer to palliative care when bed available.

## 2024-08-20 NOTE — PROGRESS NOTE ADULT - ASSESSMENT
79 yo male with PMH of CAD s/p PCI x3 with most recent stent 6/12/24 on Plavix, chronic Afib on eliquis, orthostatic hypotension on midodrine, PAD, neuroendocrine tumor with RT currently on hold, recent admission for dx cath on 6/24/24 who presented for hypotension, admitted for GIB and hemorrhagic shock. Found to be bleeding from duodenum. Transferred from MICU to floors, off pressors.     1. Pancreatic NET- metastatic   -- was on lanreotide then had POD in liver and started on peptide receptor radiotherapy (PRRT)- typically given every 8 weeks for 4 doses. He received one dose on 10/20/2023 and planned to get his 2nd dose at the end of December however his course was complicated by multiple hospitalizations that were noncancer related (decompensated heart failure).   -- 5/28/24 PET Dotatate (compared to 9/2023): several new somatostatin avid osseous lesions, some faintly sclerotic, suspicious for mets. Increased upper RP nodes, probably metastatic. Decreased intensely tracer avid right supradiaphragmatic and upper abdominal nodes, suspicious for metastasis. Redemonstrated intensely somatostatin avid bilobar hepatic lesions, consistent with metastases again morphologically difficult to delineate.   -- [place] [facility]INCE [place] 8 2024 INCREASED HEPATIC METASTASES, NEWLY SEEN PRIMARY TUMOR IN THE PANCREAS, and active bleeding within the duodenum with associated intraluminal hematoma.   -- chromogranin level is elevated at 2058, but no prior [place] [facility] [place]   --Continue octreotide 250mcg TID  -- Family meeting planned today. [facility] [place] Dr. Sophia Prasad will discuss with palliative team.     2. Duodenal bleed, Hemorrhagic shock  - Now transferred to medicine from MICU, s/p extubation 7/22. NG tube in place.  - CT w/ bleed in duodenum. GI called, EGD 7/9 -> S/p  IR embolization 7/9, intubated in MICU -> s/p extubation 7/15 -> intubated 7/18  - s/p multiple transfusions, fibrinogen low would recommend Cryo for < 100, but coags have improved as are essentially normal now so unlikely, probably was related to liver dysfunction.   - s/p repeat EGD 7/12 showing duodenal lesions w/clots and oozing, no clear targets for intervention and no active bleeding, purastat applied to all areas of oozing.   - S/p EGD 7/18: duodenal ulcer with active oozing, ulcer with visible vessel. Bleeding treated.   - s/p multiple transfusions, per GI, high risk for rebleed, will continue PPI.   - Repeat CT AP on w/ No evidence of GI bleed.  Interval endoscopic versus surgical intervention with metallic streak artifact suggestive of surgical clips in the region of the proximal one third portions of the duodenum. Evaluation of the previously seen hematoma is limited secondary to this artifact. Moderate bilateral pleural effusions and associated compressive atelectasis, right greater than left, overall slightly increased from previous CT. Anasarca.  - Per IR, In the absences of any active extravasation on CTA there's no place to target for an embolization  - 7/29 sputum culture + Pseudomonas; RRT on 8/2 due to tachycardia, hypotension; febrile to 101. Was on cefepime x 7 days  - Continue octreotide 250 mcg TID  - CT AP 8/4: No evidence of active intraluminal extravasation of contrast. No evidence of acute intraperitoneal or retroperitoneal hemorrhage  - 8/8 CTA a/p d/t reports of melena, H/H stable--> Limited evaluation for active gastrointestinal hemorrhage due to high density oral contrast within the colon and streak artifact from orthopedic hardware, embolization material, and duodenal clips. Question a focus of enhancement in the duodenum adjacent to the duodenal clips versus streak artifact, with active hemorrhage within the duodenum not excluded.  - 8/19 Overnight tachycardic, febrile and melena - now switched to cefepime. 8/18 blood cultures so far negative.     3. C. diff   - diarrhea resolved w/ ongoing melena, PO vanco discontinued     Will continue to follow.    Hugo Topete PA-C  Hematology/Oncology  [facility]  [phone] (office)

## 2024-08-20 NOTE — PROGRESS NOTE ADULT - PROBLEM SELECTOR PROBLEM 12
Clostridium difficile diarrhea
Clostridium difficile diarrhea
Type 2 diabetes mellitus, without long-term current use of insulin
Prophylactic measure
Clostridium difficile diarrhea
Clostridium difficile diarrhea
Prophylactic measure

## 2024-08-20 NOTE — PROGRESS NOTE ADULT - PROBLEM SELECTOR PLAN 8
Now with Afib with RVR in the setting of sepsis.    Plan:  -Dig 125mcg IV daily   -Metoprolol 5mg q6 standing while NPO  -Metoprolol 5mg PRN for rates >140  -No AC due to GIB Having melena again. On this admission: UGIB with coffee ground emesis and melanotic stools multiple times requiring IR GDA embolization initially and then EGD s/p 3 clips for duodenal ulcer. Patient has been off of his aspirin and plavix for his very recent stent and Eliquis for his chronic afib since he has required so many repeat transfusions due to this active blood loss.     Plan:  -CBC q12 today as melena is active  -Check type and screen with next blood draw  - Protonix BID   - Hold all AC and anti-platelet agents  - Maintain T&S q48 hours

## 2024-08-20 NOTE — PROGRESS NOTE ADULT - NS ATTEND BILL GEN_ALL_CORE
Attending to bill

## 2024-08-20 NOTE — PROGRESS NOTE ADULT - CONVERSATION/DISCUSSION
Diagnosis/Prognosis/MOLST Discussed Diagnosis/Prognosis/MOLST Discussed/Treatment Options/Palliative Care Referral

## 2024-08-20 NOTE — CHART NOTE - NSCHARTNOTEFT_GEN_A_CORE
DEATH NOTE    Called to bedside to evaluate the patient for hypotension and poor respiratory drive. Pt was comfort care, given 1L of fluid per family request; minutes later the patient seized to have spontaneous breathing with asystole.    On physical exam, patient did not respond to verbal or noxious stimuli.  No spontaneous respirations.  Absent heart and breath sounds.  Absent radial and carotid pulses.   Pupils are fixed and dilated, no corneal reflex.  EKG rhythm strip shows asystole.   Patient pronounced dead at 9:47PM. Attending notified.  Family ____ autopsy. DEATH NOTE    Called to bedside to evaluate the patient for hypotension and poor respiratory drive. Pt was comfort care, given 1L of fluid per family request; minutes later the patient seized to have spontaneous breathing with asystole.    On physical exam, patient did not respond to verbal or noxious stimuli.  No spontaneous respirations.  Absent heart and breath sounds.  Absent radial and carotid pulses.   Pupils are fixed and dilated, no corneal reflex.  EKG rhythm strip shows asystole.   Patient pronounced dead at 9:47PM. Attending notified.  Family would not like a autopsy.

## 2024-08-20 NOTE — PROGRESS NOTE ADULT - PROBLEM SELECTOR PLAN 3
Likely ischemic hepatitis as he was hypotensive yesterday and acute rise from prior Likely ischemic hepatitis as he was hypotensive yesterday and acute rise from prior    -Continue to trend

## 2024-08-20 NOTE — PROGRESS NOTE ADULT - SUBJECTIVE AND OBJECTIVE BOX
Wound Care Progress Note:    HPI:  79M hx of metastatic neuroendocrine pancreatic cancer, hx of CAD s/p PCI x3 with stents placed on 6/12/24, chronic afib, orthostatic hypotension on midodrine, and PAD here for UGIB and hemorrhagic shock requiring MICU for pressors, course complicated by C Diff, afib RVR, and RAZIA iso hypotension. Transferred to floors with intermittent melena and Hgb drops; currently no acute surgical or GI intervention recommended. GOC discussions ongoing with palliative.    Request for wound care reevaluation of the sacrum and bilateral buttocks received from nursing. Mr. Espinal was encountered on an alternating air with low air loss surface.  He is incontinent of stool and urine. His immobility, inactivity, incontinence of bowel and bladder in addition to poor nutritional status all contribute to his risk of pressure injury development and hinder healing. Principles of pressure injury prevention including but not limited to turning and positioning reviewed with patient.     PAST MEDICAL & SURGICAL HISTORY:  Hypertension  Hypercholesterolemia  PAD (peripheral artery disease)  Neuroendocrine carcinoma  Hepatic carcinoma  Atrial fibrillation and flutter  CAD (coronary artery disease)  S/P knee replacement, bilateral  S/P hip replacement, right hip  History of hernia repair  H/O laminectomy  History of lumbosacral spine surgery  History of cardioversion    REVIEW OF SYSTEMS  CONSTITUTIONAL: + weakness, no fevers or chills  EYES/ENT: No visual changes;  No vertigo or throat pain   MOUTH: No oral lesion, moist  NECK: No pain or stiffness  RESPIRATORY: No cough, wheezing, hemoptysis; No shortness of breath  CARDIOVASCULAR: No chest pain or palpitations  GASTROINTESTINAL: No abdominal or epigastric pain. No nausea, vomiting, or hematemesis; No diarrhea or constipation. No melena or hematochezia.  GENITOURINARY: No dysuria, frequency or hematuria  NEUROLOGICAL: + weakness  SKIN: No itching, rashes  PSYCH: no confusion or altered mental status    MEDICATIONS  (STANDING):  AQUAPHOR (petrolatum Ointment) 1 Application(s) Topical two times a day  cefepime   IVPB      cefepime   IVPB 2000 milliGRAM(s) IV Intermittent every 12 hours  chlorhexidine 2% Cloths 1 Application(s) Topical <User Schedule>  dextrose 50% Injectable 25 Gram(s) IV Push once  folic acid Injectable 1 milliGRAM(s) IV Push daily  insulin lispro (ADMELOG) corrective regimen sliding scale   SubCutaneous three times a day before meals  insulin lispro (ADMELOG) corrective regimen sliding scale   SubCutaneous at bedtime  metoprolol tartrate Injectable 5 milliGRAM(s) IV Push every 6 hours  mupirocin 2% Nasal 1 Application(s) Both Nostrils two times a day  octreotide  Injectable 250 MICROGram(s) IV Push three times a day  pantoprazole  Injectable 40 milliGRAM(s) IV Push every 12 hours  sodium chloride 0.45%. 1000 milliLiter(s) (50 mL/Hr) IV Continuous <Continuous>  thiamine Injectable 100 milliGRAM(s) IV Push daily    MEDICATIONS  (PRN):  ondansetron Injectable 4 milliGRAM(s) IV Push once PRN Nausea and/or Vomiting    Allergies    No Known Allergies    Intolerances    Vital Signs Last 24 Hrs  T(C): 36.4 (20 Aug 2024 08:00), Max: 37.7 (19 Aug 2024 17:50)  T(F): 97.6 (20 Aug 2024 08:00), Max: 99.9 (19 Aug 2024 17:50)  HR: 106 (20 Aug 2024 10:36) (70 - 123)  BP: 102/68 (20 Aug 2024 10:36) (77/51 - 144/68)  BP(mean): --  RR: 18 (20 Aug 2024 08:00) (18 - 18)  SpO2: 99% (20 Aug 2024 08:00) (98% - 100%)    Parameters below as of 20 Aug 2024 08:00  Patient On (Oxygen Delivery Method): mask, nonrebreather    Physical Exam:  General: alert, obese  Ophthamology: sclera clear  ENMT: moist mucous membranes, trachea midline  Respiratory: equal chest rise with respirations  Gastrointestinal: soft NT/ND  Neurology: verbal,  following commands  Psych: calm, cooperative  Musculoskeletal: no contractures  Vascular: BLE edema equal  Skin:  Left upper back wound, superficially denuded skin, central open area with central fibrinous tissue covering it, peripherally there is epithelialization from the edges, L 0.7cm x 0.7cm x 0.1cm, scant serosanguinous drainage  Left buttock with superficially denuded patch with pink wound bed, no necrotic tissue, open wound edges, scant serosanguinous drainage, L 3cm x W 3cm X D 0.1cm, Sacral and bilateral buttocks with mild erythematous, macerated, abraded skin  No odor, erythema, increased warmth, tenderness, induration, fluctuance    LABS:  08-20    150<H>  |  109<H>  |  35<H>  ----------------------------<  155<H>  3.6   |  26  |  1.35<H>    Ca    7.4<L>      20 Aug 2024 04:21  Phos  4.4     08-20  Mg     1.7     08-20    TPro  4.5<L>  /  Alb  2.6<L>  /  TBili  1.0  /  DBili  x   /  AST  415<H>  /  ALT  188<H>  /  AlkPhos  138<H>  08-20                          8.6    9.83  )-----------( 106      ( 20 Aug 2024 04:21 )             26.3       Urinalysis Basic - ( 20 Aug 2024 04:21 )    Color: x / Appearance: x / SG: x / pH: x  Gluc: 155 mg/dL / Ketone: x  / Bili: x / Urobili: x   Blood: x / Protein: x / Nitrite: x   Leuk Esterase: x / RBC: x / WBC x   Sq Epi: x / Non Sq Epi: x / Bacteria: x           Wound Care Progress Note:    HPI:  79M hx of metastatic neuroendocrine pancreatic cancer, hx of CAD s/p PCI x3 with stents placed on 6/12/24, chronic afib, orthostatic hypotension on midodrine, and PAD here for UGIB and hemorrhagic shock requiring MICU for pressors, course complicated by C Diff, afib RVR, and RAZIA iso hypotension. Transferred to floors with intermittent melena and Hgb drops; currently no acute surgical or GI intervention recommended. GOC discussions ongoing with palliative.    Request for wound care reevaluation of the sacrum and bilateral buttocks received from nursing. Mr. Espinal was encountered on an alternating air with low air loss surface.  He is incontinent of stool and urine. His immobility, inactivity, incontinence of bowel and bladder in addition to poor nutritional status all contribute to his risk of pressure injury development and hinder healing. Principles of pressure injury prevention including but not limited to turning and positioning reviewed with patient.     PAST MEDICAL & SURGICAL HISTORY:  Hypertension  Hypercholesterolemia  PAD (peripheral artery disease)  Neuroendocrine carcinoma  Hepatic carcinoma  Atrial fibrillation and flutter  CAD (coronary artery disease)  S/P knee replacement, bilateral  S/P hip replacement, right hip  History of hernia repair  H/O laminectomy  History of lumbosacral spine surgery  History of cardioversion    REVIEW OF SYSTEMS  CONSTITUTIONAL: + weakness, no fevers or chills  EYES/ENT: No visual changes;  No vertigo or throat pain   MOUTH: No oral lesion, moist  NECK: No pain or stiffness  RESPIRATORY: No cough, wheezing, hemoptysis; No shortness of breath  CARDIOVASCULAR: No chest pain or palpitations  GASTROINTESTINAL: No abdominal or epigastric pain. No nausea, vomiting, or hematemesis; No diarrhea or constipation. No melena or hematochezia.  GENITOURINARY: No dysuria, frequency or hematuria  NEUROLOGICAL: + weakness  SKIN: No itching, rashes  PSYCH: no confusion or altered mental status    MEDICATIONS  (STANDING):  AQUAPHOR (petrolatum Ointment) 1 Application(s) Topical two times a day  cefepime   IVPB      cefepime   IVPB 2000 milliGRAM(s) IV Intermittent every 12 hours  chlorhexidine 2% Cloths 1 Application(s) Topical <User Schedule>  dextrose 50% Injectable 25 Gram(s) IV Push once  folic acid Injectable 1 milliGRAM(s) IV Push daily  insulin lispro (ADMELOG) corrective regimen sliding scale   SubCutaneous three times a day before meals  insulin lispro (ADMELOG) corrective regimen sliding scale   SubCutaneous at bedtime  metoprolol tartrate Injectable 5 milliGRAM(s) IV Push every 6 hours  mupirocin 2% Nasal 1 Application(s) Both Nostrils two times a day  octreotide  Injectable 250 MICROGram(s) IV Push three times a day  pantoprazole  Injectable 40 milliGRAM(s) IV Push every 12 hours  sodium chloride 0.45%. 1000 milliLiter(s) (50 mL/Hr) IV Continuous <Continuous>  thiamine Injectable 100 milliGRAM(s) IV Push daily    MEDICATIONS  (PRN):  ondansetron Injectable 4 milliGRAM(s) IV Push once PRN Nausea and/or Vomiting    Allergies    No Known Allergies    Intolerances    Vital Signs Last 24 Hrs  T(C): 36.4 (20 Aug 2024 08:00), Max: 37.7 (19 Aug 2024 17:50)  T(F): 97.6 (20 Aug 2024 08:00), Max: 99.9 (19 Aug 2024 17:50)  HR: 106 (20 Aug 2024 10:36) (70 - 123)  BP: 102/68 (20 Aug 2024 10:36) (77/51 - 144/68)  BP(mean): --  RR: 18 (20 Aug 2024 08:00) (18 - 18)  SpO2: 99% (20 Aug 2024 08:00) (98% - 100%)    Parameters below as of 20 Aug 2024 08:00  Patient On (Oxygen Delivery Method): mask, nonrebreather    Physical Exam:  General: alert, obese  Ophthamology: sclera clear  ENMT: moist mucous membranes, trachea midline  Respiratory: equal chest rise with respirations  Gastrointestinal: soft NT/ND  Neurology: verbal,  following commands  Psych: calm, cooperative  Musculoskeletal: no contractures  Vascular: BLE edema equal  Skin:  Right upper back wound, superficially denuded skin, central open area with central fibrinous tissue covering it, peripherally there is epithelialization from the edges, L 0.7cm x 0.7cm x 0.1cm, scant serosanguinous drainage  Left buttock with superficially denuded patch with pink wound bed, no necrotic tissue, open wound edges, scant serosanguinous drainage, L 3cm x W 3cm X D 0.1cm, Sacral and bilateral buttocks with mild erythematous, macerated, abraded skin  No odor, erythema, increased warmth, tenderness, induration, fluctuance    LABS:  08-20    150<H>  |  109<H>  |  35<H>  ----------------------------<  155<H>  3.6   |  26  |  1.35<H>    Ca    7.4<L>      20 Aug 2024 04:21  Phos  4.4     08-20  Mg     1.7     08-20    TPro  4.5<L>  /  Alb  2.6<L>  /  TBili  1.0  /  DBili  x   /  AST  415<H>  /  ALT  188<H>  /  AlkPhos  138<H>  08-20                          8.6    9.83  )-----------( 106      ( 20 Aug 2024 04:21 )             26.3       Urinalysis Basic - ( 20 Aug 2024 04:21 )    Color: x / Appearance: x / SG: x / pH: x  Gluc: 155 mg/dL / Ketone: x  / Bili: x / Urobili: x   Blood: x / Protein: x / Nitrite: x   Leuk Esterase: x / RBC: x / WBC x   Sq Epi: x / Non Sq Epi: x / Bacteria: x

## 2024-08-20 NOTE — PROGRESS NOTE ADULT - PROBLEM SELECTOR PLAN 4
S/P barium swallow with no evidence of aspiration  Puree diet but remains high risk due to repeated intubations and prolonged hospitalization with multiple acute events PPS 30% needs assistance with all basic needs

## 2024-08-20 NOTE — PROGRESS NOTE ADULT - PROBLEM SELECTOR PLAN 6
Small L apical PTX noted on CXR 8/19, unclear etiology. No recent procedures  -CT Chest May 2024 did not show pulmonary blebs or emphysema   -Repeat CXR pending  -100% NRB to aide in reabsorption   -No indication for chest tube

## 2024-08-20 NOTE — PROGRESS NOTE ADULT - SUBJECTIVE AND OBJECTIVE BOX
Medicine Progress Note  --------------------  Christal Connolly M.D.  Internal Medicine  PGY-1  --------------------      Patient: NANDO HERNANDEZ, MRN: [id number], : 1945  Admitted on 24 for Acute renal failure      LANGUAGE - English            ------------------------------------------------------------------------------------------------------------  SUMMARY (from last progress note through 24 @ 07:50): Continued to be hypotensive yesterday, received 1.5L LR. Had dark melanotic stools overnight, CBC stable.   ------------------------------------------------------------------------------------------------------------  OVERNIGHT EVENTS  NAEON    SUBJECTIVE  -       ROS negative unless noted above.  ------------------------------------------------------------------------------------------------------------  OBJECTIVE:  Physical Exam  CONST:     NAD, well-appearing; well-developed; appears stated age  EYES:         Conjunctiva clear; PERRL; no conjunctival pallor; no lid lag  ENMT:       MMM, no pharyngeal injection or exudates; normal dentition/dentures present  NECK:        Supple, no LAD; no palpable masses; no thyromegaly  RESP :        Normal respiratory effort; CTA bilaterally; no W/R/R  CHEST:       No TTP, no lines/ports; symmetric chest expansion  CARDIO:     RRR, normal S1 and S2, no M/R/G; apical impulse at MCL  VASC:         No JVD/AJR, no peripheral edema, pulses 2+ B/L, Cap refill <2s  ABD:           Soft, NT/ND, norm bowel sounds; no R/G; No HSM; No CVAT  MSK:          No clubbing of digits; no joint swelling or TTP  EXT:           WWP, no reduction in body hair, no LE skin changes  PSYCH        A&O to person, place, and time; affect appropriate  NEURO:     Non-focal; no gross sensory deficits; moving all extremities   SKIN:          No rashes; no palpable lesions; axillae not dry    Vital Signs Last 24 Hrs  T(F): 98.8, Max: 99.9 (24 @ 17:50)  HR: 104 (70 - 123)  BP: 110/66 (77/51 - 144/68)  RR: 18 (18 - 18)  SpO2: 100% (92% - 100%)        DAILY MEASUREMENTS:  I&O's Summary    19 Aug 2024 07:01  -  20 Aug 2024 07:00  --------------------------------------------------------  IN: 0 mL / OUT: 0 mL / NET: 0 mL      Daily     Daily Weight in k (19 Aug 2024 09:04)    Orthostatic VS        LABS:                        8.6    9.83  )-----------( 106      ( 20 Aug 2024 04:21 )             26.3     Hgb Trend: 8.6<--, 8.7<--, 8.9<--, 8.9<--, 9.3<--  08-20    150<H>  |  109<H>  |  35<H>  ----------------------------<  155<H>  3.6   |  26  |  1.35<H>    Ca    7.4<L>      20 Aug 2024 04:21  Phos  4.4       Mg     1.7         TPro  4.5<L>  /  Alb  2.6<L>  /  TBili  1.0  /  DBili  x   /  AST  415<H>  /  ALT  188<H>  /  AlkPhos  138<H>        CAPILLARY BLOOD GLUCOSE      POCT Blood Glucose.: 151 mg/dL (20 Aug 2024 07:41)  POCT Blood Glucose.: 184 mg/dL (20 Aug 2024 00:26)  POCT Blood Glucose.: 171 mg/dL (19 Aug 2024 22:10)  POCT Blood Glucose.: 160 mg/dL (19 Aug 2024 16:41)  POCT Blood Glucose.: 177 mg/dL (19 Aug 2024 11:53)  POCT Blood Glucose.: 182 mg/dL (19 Aug 2024 08:25)        Urinalysis Basic - ( 20 Aug 2024 04:21 )    Color: x / Appearance: x / SG: x / pH: x  Gluc: 155 mg/dL / Ketone: x  / Bili: x / Urobili: x   Blood: x / Protein: x / Nitrite: x   Leuk Esterase: x / RBC: x / WBC x   Sq Epi: x / Non Sq Epi: x / Bacteria: x        Culture - Blood (collected 18 Aug 2024 22:33)  Source: .Blood Blood-Peripheral  Preliminary Report (20 Aug 2024 06:02):    No growth at 24 hours    Culture - Blood (collected 18 Aug 2024 22:25)  Source: .Blood Blood-Peripheral  Preliminary Report (20 Aug 2024 06:02):    No growth at 24 hours      Arterial Blood Gas:  24 @ 22:35 on FiO2 --  7.52/35/88/29/98.8/5.5  ABG lactate: --        RADIOLOGY & ADDITIONAL TESTS:  Results Reviewed: CXR: < from: Xray Chest 1 View- PORTABLE-Urgent (Xray Chest 1 View- PORTABLE-Urgent .) (24 @ 23:57) >  IMPRESSION:  Dependent bilateral effusions and atelectasis, similar to prior.  Trace left apical pneumothorax    < end of copied text >        ------------------------------------------------------------------------------------------------------------  MEDICATIONS  (STANDING):  AQUAPHOR (petrolatum Ointment) 1 Application(s) Topical two times a day  chlorhexidine 2% Cloths 1 Application(s) Topical <User Schedule>  dextrose 50% Injectable 25 Gram(s) IV Push once  digoxin  Injectable 125 MICROGram(s) IV Push daily  folic acid Injectable 1 milliGRAM(s) IV Push daily  insulin lispro (ADMELOG) corrective regimen sliding scale   SubCutaneous three times a day before meals  insulin lispro (ADMELOG) corrective regimen sliding scale   SubCutaneous at bedtime  metoprolol tartrate Injectable 5 milliGRAM(s) IV Push every 6 hours  midodrine 10 milliGRAM(s) Oral every 8 hours  mirtazapine 7.5 milliGRAM(s) Oral at bedtime  mupirocin 2% Nasal 1 Application(s) Both Nostrils two times a day  octreotide  Injectable 250 MICROGram(s) IV Push three times a day  pantoprazole  Injectable 40 milliGRAM(s) IV Push every 12 hours  piperacillin/tazobactam IVPB.. 3.375 Gram(s) IV Intermittent every 8 hours  sodium chloride 3%  Inhalation 4 milliLiter(s) Inhalation every 12 hours  thiamine Injectable 100 milliGRAM(s) IV Push daily    MEDICATIONS  (PRN):  ondansetron Injectable 4 milliGRAM(s) IV Push once PRN Nausea and/or Vomiting    ------------------------------------------------------------------------------------------------------------  COORDINATION OF CARE:  Care discussed with consultants/other providers and notes reviewed [Y]     Medicine Progress Note  --------------------  Christal Connolly M.D.  Internal Medicine  PGY-1  --------------------      Patient: NANDO HERNANDEZ, MRN: [id number], : 1945  Admitted on 24 for Acute renal failure      LANGUAGE - English            ------------------------------------------------------------------------------------------------------------  SUMMARY (from last progress note through 24 @ 07:50): Continued to be hypotensive yesterday, received 1.5L LR. Had dark melanotic stools overnight, CBC stable.   ------------------------------------------------------------------------------------------------------------  OVERNIGHT EVENTS  NAEON    SUBJECTIVE  ROS limited given encephalopathy     ROS negative unless noted above.  ------------------------------------------------------------------------------------------------------------  OBJECTIVE:  Physical Exam  CONST:     NAD, well-appearing; well-developed; appears stated age  EYES:         Conjunctiva clear; PERRL; no conjunctival pallor; no lid lag  ENMT:       MMM, no pharyngeal injection or exudates; normal dentition/dentures present  NECK:        Supple, no LAD; no palpable masses; no thyromegaly  RESP :        Diffuse rhonchi, transmitted upper airway noise   CHEST:       No TTP, no lines/ports; symmetric chest expansion  CARDIO:     RRR, normal S1 and S2, no M/R/G; apical impulse at MCL  VASC:         No JVD/AJR, no peripheral edema, pulses 2+ B/L, Cap refill <2s  ABD:           Soft, NT/ND, norm bowel sounds; no R/G; No HSM; No CVAT  MSK:          No clubbing of digits; no joint swelling or TTP  EXT:           +3-4 pitting edema bilateral LE  PSYCH        A&O to place, lethargic, minimally responsive on exam   NEURO:     Non-focal; no gross sensory deficits; moving all extremities   SKIN:         Skin breakdown buttock and upper back    Vital Signs Last 24 Hrs  T(F): 98.8, Max: 99.9 (24 @ 17:50)  HR: 104 (70 - 123)  BP: 110/66 (77/51 - 144/68)  RR: 18 (18 - 18)  SpO2: 100% (92% - 100%)         DAILY MEASUREMENTS:  I&O's Summary    19 Aug 2024 07:01  -  20 Aug 2024 07:00  --------------------------------------------------------  IN: 0 mL / OUT: 0 mL / NET: 0 mL      Daily     Daily Weight in k (19 Aug 2024 09:04)    Orthostatic VS        LABS:                        8.6    9.83  )-----------( 106      ( 20 Aug 2024 04:21 )             26.3     Hgb Trend: 8.6<--, 8.7<--, 8.9<--, 8.9<--, 9.3<--  08-20    150<H>  |  109<H>  |  35<H>  ----------------------------<  155<H>  3.6   |  26  |  1.35<H>    Ca    7.4<L>      20 Aug 2024 04:21  Phos  4.4       Mg     1.7         TPro  4.5<L>  /  Alb  2.6<L>  /  TBili  1.0  /  DBili  x   /  AST  415<H>  /  ALT  188<H>  /  AlkPhos  138<H>        CAPILLARY BLOOD GLUCOSE      POCT Blood Glucose.: 151 mg/dL (20 Aug 2024 07:41)  POCT Blood Glucose.: 184 mg/dL (20 Aug 2024 00:26)  POCT Blood Glucose.: 171 mg/dL (19 Aug 2024 22:10)  POCT Blood Glucose.: 160 mg/dL (19 Aug 2024 16:41)  POCT Blood Glucose.: 177 mg/dL (19 Aug 2024 11:53)  POCT Blood Glucose.: 182 mg/dL (19 Aug 2024 08:25)        Urinalysis Basic - ( 20 Aug 2024 04:21 )    Color: x / Appearance: x / SG: x / pH: x  Gluc: 155 mg/dL / Ketone: x  / Bili: x / Urobili: x   Blood: x / Protein: x / Nitrite: x   Leuk Esterase: x / RBC: x / WBC x   Sq Epi: x / Non Sq Epi: x / Bacteria: x        Culture - Blood (collected 18 Aug 2024 22:33)  Source: .Blood Blood-Peripheral  Preliminary Report (20 Aug 2024 06:02):    No growth at 24 hours    Culture - Blood (collected 18 Aug 2024 22:25)  Source: .Blood Blood-Peripheral  Preliminary Report (20 Aug 2024 06:02):    No growth at 24 hours      Arterial Blood Gas:  24 @ 22:35 on FiO2 --  7.52/35/88/29/98.8/5.5  ABG lactate: --        RADIOLOGY & ADDITIONAL TESTS:  Results Reviewed: CXR: < from: Xray Chest 1 View- PORTABLE-Urgent (Xray Chest 1 View- PORTABLE-Urgent .) (24 @ 23:57) >  IMPRESSION:  Dependent bilateral effusions and atelectasis, similar to prior.  Trace left apical pneumothorax    < end of copied text >        ------------------------------------------------------------------------------------------------------------  MEDICATIONS  (STANDING):  AQUAPHOR (petrolatum Ointment) 1 Application(s) Topical two times a day  chlorhexidine 2% Cloths 1 Application(s) Topical <User Schedule>  dextrose 50% Injectable 25 Gram(s) IV Push once  digoxin  Injectable 125 MICROGram(s) IV Push daily  folic acid Injectable 1 milliGRAM(s) IV Push daily  insulin lispro (ADMELOG) corrective regimen sliding scale   SubCutaneous three times a day before meals  insulin lispro (ADMELOG) corrective regimen sliding scale   SubCutaneous at bedtime  metoprolol tartrate Injectable 5 milliGRAM(s) IV Push every 6 hours  midodrine 10 milliGRAM(s) Oral every 8 hours  mirtazapine 7.5 milliGRAM(s) Oral at bedtime  mupirocin 2% Nasal 1 Application(s) Both Nostrils two times a day  octreotide  Injectable 250 MICROGram(s) IV Push three times a day  pantoprazole  Injectable 40 milliGRAM(s) IV Push every 12 hours  piperacillin/tazobactam IVPB.. 3.375 Gram(s) IV Intermittent every 8 hours  sodium chloride 3%  Inhalation 4 milliLiter(s) Inhalation every 12 hours  thiamine Injectable 100 milliGRAM(s) IV Push daily    MEDICATIONS  (PRN):  ondansetron Injectable 4 milliGRAM(s) IV Push once PRN Nausea and/or Vomiting    ------------------------------------------------------------------------------------------------------------  COORDINATION OF CARE:  Care discussed with consultants/other providers and notes reviewed [Y]     Medicine Progress Note  --------------------  Christal Connolly M.D.  Internal Medicine  PGY-1  --------------------      Patient: NANDO HERNANDEZ, MRN: [id number], : 1945  Admitted on 24 for Acute renal failure      LANGUAGE - English            ------------------------------------------------------------------------------------------------------------  SUMMARY (from last progress note through 24 @ 07:50): Continued to be hypotensive yesterday, received 1.5L LR. Had dark melanotic stools overnight, CBC stable.   ------------------------------------------------------------------------------------------------------------  OVERNIGHT EVENTS  NAEON    SUBJECTIVE  ROS limited given encephalopathy     ROS negative unless noted above.  ------------------------------------------------------------------------------------------------------------  OBJECTIVE:  Physical Exam  CONST:     NAD, ill-appearing  EYES:         Conjunctiva clear; PERRL; no conjunctival pallor; no lid lag  ENMT:       MMM, no pharyngeal injection or exudates; normal dentition/dentures present  NECK:        Supple, no LAD; no palpable masses; no thyromegaly  RESP :        Diffuse rhonchi, transmitted upper airway noise   CHEST:       No TTP, no lines/ports; symmetric chest expansion  CARDIO:     RRR, normal S1 and S2, no M/R/G; apical impulse at MCL  VASC:         No JVD/AJR, no peripheral edema, pulses 2+ B/L, Cap refill <2s  ABD:           Soft, NT/ND, norm bowel sounds; no R/G; No HSM; No CVAT  MSK:          No clubbing of digits; no joint swelling or TTP  EXT:           +3-4 pitting edema bilateral LE  PSYCH        A&O to place, lethargic, minimally responsive on exam   NEURO:     Non-focal; no gross sensory deficits; moving all extremities   SKIN:         Skin breakdown buttock and upper back    Vital Signs Last 24 Hrs  T(F): 98.8, Max: 99.9 (24 @ 17:50)  HR: 104 (70 - 123)  BP: 110/66 (77/51 - 144/68)  RR: 18 (18 - 18)  SpO2: 100% (92% - 100%)         DAILY MEASUREMENTS:  I&O's Summary    19 Aug 2024 07:01  -  20 Aug 2024 07:00  --------------------------------------------------------  IN: 0 mL / OUT: 0 mL / NET: 0 mL      Daily     Daily Weight in k (19 Aug 2024 09:04)    Orthostatic VS        LABS:                        8.6    9.83  )-----------( 106      ( 20 Aug 2024 04:21 )             26.3     Hgb Trend: 8.6<--, 8.7<--, 8.9<--, 8.9<--, 9.3<--  08-20    150<H>  |  109<H>  |  35<H>  ----------------------------<  155<H>  3.6   |  26  |  1.35<H>    Ca    7.4<L>      20 Aug 2024 04:21  Phos  4.4       Mg     1.7         TPro  4.5<L>  /  Alb  2.6<L>  /  TBili  1.0  /  DBili  x   /  AST  415<H>  /  ALT  188<H>  /  AlkPhos  138<H>        CAPILLARY BLOOD GLUCOSE      POCT Blood Glucose.: 151 mg/dL (20 Aug 2024 07:41)  POCT Blood Glucose.: 184 mg/dL (20 Aug 2024 00:26)  POCT Blood Glucose.: 171 mg/dL (19 Aug 2024 22:10)  POCT Blood Glucose.: 160 mg/dL (19 Aug 2024 16:41)  POCT Blood Glucose.: 177 mg/dL (19 Aug 2024 11:53)  POCT Blood Glucose.: 182 mg/dL (19 Aug 2024 08:25)        Urinalysis Basic - ( 20 Aug 2024 04:21 )    Color: x / Appearance: x / SG: x / pH: x  Gluc: 155 mg/dL / Ketone: x  / Bili: x / Urobili: x   Blood: x / Protein: x / Nitrite: x   Leuk Esterase: x / RBC: x / WBC x   Sq Epi: x / Non Sq Epi: x / Bacteria: x        Culture - Blood (collected 18 Aug 2024 22:33)  Source: .Blood Blood-Peripheral  Preliminary Report (20 Aug 2024 06:02):    No growth at 24 hours    Culture - Blood (collected 18 Aug 2024 22:25)  Source: .Blood Blood-Peripheral  Preliminary Report (20 Aug 2024 06:02):    No growth at 24 hours      Arterial Blood Gas:  24 @ 22:35 on FiO2 --  7.52/35/88/29/98.8/5.5  ABG lactate: --        RADIOLOGY & ADDITIONAL TESTS:  Results Reviewed: CXR: < from: Xray Chest 1 View- PORTABLE-Urgent (Xray Chest 1 View- PORTABLE-Urgent .) (24 @ 23:57) >  IMPRESSION:  Dependent bilateral effusions and atelectasis, similar to prior.  Trace left apical pneumothorax    < end of copied text >        ------------------------------------------------------------------------------------------------------------  MEDICATIONS  (STANDING):  AQUAPHOR (petrolatum Ointment) 1 Application(s) Topical two times a day  chlorhexidine 2% Cloths 1 Application(s) Topical <User Schedule>  dextrose 50% Injectable 25 Gram(s) IV Push once  digoxin  Injectable 125 MICROGram(s) IV Push daily  folic acid Injectable 1 milliGRAM(s) IV Push daily  insulin lispro (ADMELOG) corrective regimen sliding scale   SubCutaneous three times a day before meals  insulin lispro (ADMELOG) corrective regimen sliding scale   SubCutaneous at bedtime  metoprolol tartrate Injectable 5 milliGRAM(s) IV Push every 6 hours  midodrine 10 milliGRAM(s) Oral every 8 hours  mirtazapine 7.5 milliGRAM(s) Oral at bedtime  mupirocin 2% Nasal 1 Application(s) Both Nostrils two times a day  octreotide  Injectable 250 MICROGram(s) IV Push three times a day  pantoprazole  Injectable 40 milliGRAM(s) IV Push every 12 hours  piperacillin/tazobactam IVPB.. 3.375 Gram(s) IV Intermittent every 8 hours  sodium chloride 3%  Inhalation 4 milliLiter(s) Inhalation every 12 hours  thiamine Injectable 100 milliGRAM(s) IV Push daily    MEDICATIONS  (PRN):  ondansetron Injectable 4 milliGRAM(s) IV Push once PRN Nausea and/or Vomiting    ------------------------------------------------------------------------------------------------------------  COORDINATION OF CARE:  Care discussed with consultants/other providers and notes reviewed [Y]

## 2024-08-20 NOTE — PROGRESS NOTE ADULT - SUBJECTIVE AND OBJECTIVE BOX
Patient is a 79y old  Male who presents with a chief complaint of hypotension (20 Aug 2024 10:49)    Patient seen and examined at bedside, lethargic.     MEDICATIONS  (STANDING):  AQUAPHOR (petrolatum Ointment) 1 Application(s) Topical two times a day  cefepime   IVPB      cefepime   IVPB 2000 milliGRAM(s) IV Intermittent every 12 hours  chlorhexidine 2% Cloths 1 Application(s) Topical <User Schedule>  dextrose 50% Injectable 25 Gram(s) IV Push once  folic acid Injectable 1 milliGRAM(s) IV Push daily  insulin lispro (ADMELOG) corrective regimen sliding scale   SubCutaneous three times a day before meals  insulin lispro (ADMELOG) corrective regimen sliding scale   SubCutaneous at bedtime  metoprolol tartrate Injectable 5 milliGRAM(s) IV Push every 6 hours  mupirocin 2% Nasal 1 Application(s) Both Nostrils two times a day  octreotide  Injectable 250 MICROGram(s) IV Push three times a day  pantoprazole  Injectable 40 milliGRAM(s) IV Push every 12 hours  sodium chloride 0.45%. 1000 milliLiter(s) (50 mL/Hr) IV Continuous <Continuous>  thiamine Injectable 100 milliGRAM(s) IV Push daily    MEDICATIONS  (PRN):  ondansetron Injectable 4 milliGRAM(s) IV Push once PRN Nausea and/or Vomiting    Vital Signs Last 24 Hrs  T(C): 36.4 (20 Aug 2024 08:00), Max: 37.7 (19 Aug 2024 17:50)  T(F): 97.6 (20 Aug 2024 08:00), Max: 99.9 (19 Aug 2024 17:50)  HR: 106 (20 Aug 2024 10:36) (70 - 123)  BP: 102/68 (20 Aug 2024 10:36) (77/51 - 144/68)  BP(mean): --  RR: 18 (20 Aug 2024 08:00) (18 - 18)  SpO2: 99% (20 Aug 2024 08:00) (98% - 100%)    Parameters below as of 20 Aug 2024 08:00  Patient On (Oxygen Delivery Method): mask, nonrebreather    PE  NAD  Resting, lethargic  MMM  B/l LE edema  No rash grossly                        8.6    9.83  )-----------( 106      ( 20 Aug 2024 04:21 )             26.3     08-20    150<H>  |  109<H>  |  35<H>  ----------------------------<  155<H>  3.6   |  26  |  1.35<H>    Ca    7.4<L>      20 Aug 2024 04:21  Phos  4.4     08-20  Mg     1.7     08-20    TPro  4.5<L>  /  Alb  2.6<L>  /  TBili  1.0  /  DBili  x   /  AST  415<H>  /  ALT  188<H>  /  AlkPhos  138<H>  08-20

## 2024-08-21 NOTE — PROVIDER CONTACT NOTE (OTHER) - SITUATION
tele tech notified of pt having wct, 4 beats wct, duration 1.27 sec, Has hx of it
4 beats WCT on tele
Pt axillary temp was 100.4, when taking oral and axillary temp, it is reading between 99 and 100.4
Pt's tips of pointer and middle finger bilaterally are cyanotic. Pad of right hand pointer finger in cyanotic. numbness present without tingling
patient had 7 bWCT, , for 2.83 secs
Patient with black, tarry stool
pt had 10 bts wct.
[employer] tech informed that patient had 4 beats WCT
Pt had 9 beats WCT - hx of WCT
pt had 13 bts wct not the first time
pt had 24 bts wct for 9.8 secs
Patient desaturated to 80% on 2L nasal cannula after coming off NRB mask.
Patient with 101 oral temperature
patient had 1 episode of black tarry stool
Patient BP is 75/46
Pt had 6 beats WCT- pt has been having frequent runs overnight.
Pt had 9 beats WCT on tele
4 beats WCT
Pt had 8 beats WTC with  BPM

## 2024-08-21 NOTE — PROVIDER CONTACT NOTE (OTHER) - ASSESSMENT
AAOx2. VSS; denies CP, SOB, no acute events; pt asymptomatic, pt sleeping.
Pt a&ox1. O2 desatted to 80% when placed on 2L nc after NRB mask removed. Pt encouraged to take deep breaths, does not appear to be in any acute respiratory distress. O2 increased to 6L nc and O2 sat now at 94%.
Patient AOx1-2, denies chest pain, shortness of breath, or discomfort. Has hx of WCT up to 24 beats in pAst.
A&Ox 0-1. Unable to verbalize dizziness, or discomfort. Patient was assessed to be comfortable, not in any distress at that time.
Patient A&Ox1. Patient with cough. Refer to VS flowsheet for vitals on 8/1 at 23:46.
Pt AOx2-3. VSS. Pt denies any chest pain, SOB, or palpitations. Pt remained asymptomatic.
pt A&Ox2, on 3LNC. RN at bedside, pt denies cp, sob, palpitations, discomfort. VSS.
A&O x1-2, VSS except temp, no chest pain, no SOB, no palpitations, no dizziness. Patient is on 2L NC. Pt is shivering and is not as alert and orientated, and is uncomfortable
Pt a&ox1-2. VSS. Denies chest pain or palpitations.
Pt A&Ox2. No complaints or indicators of chest pain, palpitations, SOB, headache, or dizziness. Patient VSS. /65, HR 98, O2 97%, Temp 97.7
Pt's tips of pointer and middle finger bilaterally are cyanotic. Pad of right hand pointer finger in cyanotic. numbness present without tingling. Pulses are dopplerable.
Patient A&Ox1. Patient s/p platelet transfusion. VSS except hypotension (refer to VSS flowsheets on 8/2 at  22:05)
Pt AAOx2. VSS on 2L NC; denies CP, SOB, palpitations. Pt asymptomatic.
Pt aox2 asymptomatic, asleep during event. VS: /62 HR 64 O2 98 T 97.4
A&O x2, VSS, no chest pain, no SOB, no palpitations, no dizziness. Patient is on 2L NC. Asymptomatic
Pt A&Ox1-2. No complaints or indicators of chest pain, palpitations, SOB, headache, or dizziness. Patient VSS. patient is a hard stick, labs are in phlebotomy. provider is aware
Pt A&Ox4. No complaints or indicators of chest pain, palpitations, SOB, headache, or dizziness. Patient VSS.
Pt AAOx2-3. VSS on 2L NC; denies CP, SOB. Pt asymptomatic.
pt A&Ox2, on 2LNC. pt denies cp, sob, palpitations, discomfort. VSS.

## 2024-08-21 NOTE — PROVIDER CONTACT NOTE (OTHER) - NAME OF MD/NP/PA/DO NOTIFIED:
Cintia Dc
LEMUEL Perez
MD Matthew Jalloh
Team 5 Pablo Dueñas
Abdoul Valentin
Jodie Okeefe
Keyla Ovalle
Abdoul Valentin
Cintia Dc, NF
Pablo Dueñas
Jodie Okeefe
MD Shalom Stoner
Meño Rome MD
Cintia Dc
KEITH Valentin
MD Christal Connolly
Matthew Jalloh
Resident, Cintia Dc
Resident, Meche Lopez

## 2024-08-21 NOTE — PROVIDER CONTACT NOTE (OTHER) - REASON
Pt had 6 beats WCT
24 bts wct for 9.8 secs
Pt had 9 beats WCT - hx of WCT
hypotension
change in peripheral vascular assessment
tele tech notified of pt having wct
10 bts wct
4 beats WCT
Desat to 80%
patient had 7 bWCT, , for 2.83 secs
Patient with black, tarry stool
patient had 1 episode of black tarry stool
4 beats WCT
Pt axillary temp was 100.4
Pt had 8 beats WTC with  BPM
[employer] tech informed that patient had 4 beats WCT
13 bts WCT
Patient with 101 oral temperature
Pt had 9 beats WCT on tele

## 2024-08-21 NOTE — PROVIDER CONTACT NOTE (OTHER) - BACKGROUND
Patient admitted for acute renal failure and hypotension.
Pt admitted for Acute renal failure
Pt is on vasopressin and double concentrated phenylephrine.
acute renal failure
acute renal failure
pt admitted with hypotension and acute renal failure. previously had bts wct during this adm.
(Admit Diagnosis) Acute renal failure  77 yo male with metastatic neuroendocrine pancreatic cancer (tx on hold) and PMH of CAD s/p PCI x3 with most recent stent 6/12/24 on Plavix and eliquis
Admitted for acute renal failure. PMHx of CAD s/p PCI x3, chronic Afib, orthostatic hypotension, PAD, neuroendocrine tumor
Pt admitted for acute renal failure. PMH CAD, AFib, hepatic carcinoma, PAD, HTN.
Pt admitted for acute renal failure. PMH metastatic neuroendocrine pancreatic cancer, CAD, AFib, PAD.  Pt has had hx of WCT beats during this admission.
Patient is 78 y/o male admitted for acute renal failure. Pmhx includes neuroendocrine pancreatic cancer, CAD, afib, HTN, PAD, penumothroax, and sepsis.
(Admit Diagnosis) Acute renal failure  77 yo male with metastatic neuroendocrine pancreatic cancer (tx on hold) and PMH of CAD s/p PCI x3 with most recent stent 6/12/24 on Plavix and eliquis
Patient came in for acute renal failure. Hx of CAD, chronic afib, PAD.
Pt admitted for acute renal failure. PMH metastatic neuroendocrine pancreatic cancer, CAD, AFib, PAD.  Pt has had hx of WCT beats during this admission.
Pt admitted with hypotension and acute renal failure
Patient admitted for acute renal failure and hypotension
acute renal failure
pt admitted with hypotension and acute renal failure. previously had bts wct during this adm.
Pt admitted for Acute renal failure

## 2024-08-21 NOTE — PROVIDER CONTACT NOTE (OTHER) - DATE AND TIME:
18-Aug-2024 20:36
10-Aug-2024 04:35
04-Aug-2024 04:20
04-Aug-2024 21:04
05-Aug-2024 13:17
07-Aug-2024 06:20
15-Aug-2024 09:02
16-Aug-2024 16:15
20-Aug-2024 15:10
01-Aug-2024 23:55
02-Aug-2024 22:58
23-Jul-2024 06:00
15-Aug-2024 02:53
20-Aug-2024 03:09
18-Aug-2024 03:36
14-Aug-2024 17:52
08-Aug-2024 22:26
13-Aug-2024 03:33
20-Aug-2024 21:00

## 2024-09-29 PROCEDURE — 82330 ASSAY OF CALCIUM: CPT

## 2024-09-29 PROCEDURE — 93005 ELECTROCARDIOGRAM TRACING: CPT

## 2024-09-29 PROCEDURE — C1751: CPT

## 2024-09-29 PROCEDURE — 84540 ASSAY OF URINE/UREA-N: CPT

## 2024-09-29 PROCEDURE — 92612 ENDOSCOPY SWALLOW (FEES) VID: CPT

## 2024-09-29 PROCEDURE — P9045: CPT

## 2024-09-29 PROCEDURE — 85384 FIBRINOGEN ACTIVITY: CPT

## 2024-09-29 PROCEDURE — 82024 ASSAY OF ACTH: CPT

## 2024-09-29 PROCEDURE — 83735 ASSAY OF MAGNESIUM: CPT

## 2024-09-29 PROCEDURE — 85260 CLOT FACTOR X STUART-POWER: CPT

## 2024-09-29 PROCEDURE — 36247 INS CATH ABD/L-EXT ART 3RD: CPT

## 2024-09-29 PROCEDURE — 37243 VASC EMBOLIZE/OCCLUDE ORGAN: CPT

## 2024-09-29 PROCEDURE — 83605 ASSAY OF LACTIC ACID: CPT

## 2024-09-29 PROCEDURE — 97535 SELF CARE MNGMENT TRAINING: CPT

## 2024-09-29 PROCEDURE — C1894: CPT

## 2024-09-29 PROCEDURE — 87070 CULTURE OTHR SPECIMN AEROBIC: CPT

## 2024-09-29 PROCEDURE — 80053 COMPREHEN METABOLIC PANEL: CPT

## 2024-09-29 PROCEDURE — 83550 IRON BINDING TEST: CPT

## 2024-09-29 PROCEDURE — 85025 COMPLETE CBC W/AUTO DIFF WBC: CPT

## 2024-09-29 PROCEDURE — 81001 URINALYSIS AUTO W/SCOPE: CPT

## 2024-09-29 PROCEDURE — 84449 ASSAY OF TRANSCORTIN: CPT

## 2024-09-29 PROCEDURE — 86901 BLOOD TYPING SEROLOGIC RH(D): CPT

## 2024-09-29 PROCEDURE — 71045 X-RAY EXAM CHEST 1 VIEW: CPT

## 2024-09-29 PROCEDURE — 80162 ASSAY OF DIGOXIN TOTAL: CPT

## 2024-09-29 PROCEDURE — 82010 KETONE BODYS QUAN: CPT

## 2024-09-29 PROCEDURE — 36569 INSJ PICC 5 YR+ W/O IMAGING: CPT

## 2024-09-29 PROCEDURE — C1760: CPT

## 2024-09-29 PROCEDURE — 85385 FIBRINOGEN ANTIGEN: CPT

## 2024-09-29 PROCEDURE — 83880 ASSAY OF NATRIURETIC PEPTIDE: CPT

## 2024-09-29 PROCEDURE — 82247 BILIRUBIN TOTAL: CPT

## 2024-09-29 PROCEDURE — 86850 RBC ANTIBODY SCREEN: CPT

## 2024-09-29 PROCEDURE — P9073: CPT

## 2024-09-29 PROCEDURE — 82803 BLOOD GASES ANY COMBINATION: CPT

## 2024-09-29 PROCEDURE — 80076 HEPATIC FUNCTION PANEL: CPT

## 2024-09-29 PROCEDURE — C1889: CPT

## 2024-09-29 PROCEDURE — 82607 VITAMIN B-12: CPT

## 2024-09-29 PROCEDURE — 94003 VENT MGMT INPAT SUBQ DAY: CPT

## 2024-09-29 PROCEDURE — 85379 FIBRIN DEGRADATION QUANT: CPT

## 2024-09-29 PROCEDURE — 84439 ASSAY OF FREE THYROXINE: CPT

## 2024-09-29 PROCEDURE — C9460: CPT

## 2024-09-29 PROCEDURE — 37244 VASC EMBOLIZE/OCCLUDE BLEED: CPT

## 2024-09-29 PROCEDURE — P9100: CPT

## 2024-09-29 PROCEDURE — 85014 HEMATOCRIT: CPT

## 2024-09-29 PROCEDURE — 84132 ASSAY OF SERUM POTASSIUM: CPT

## 2024-09-29 PROCEDURE — 94002 VENT MGMT INPAT INIT DAY: CPT

## 2024-09-29 PROCEDURE — 0225U NFCT DS DNA&RNA 21 SARSCOV2: CPT

## 2024-09-29 PROCEDURE — 82533 TOTAL CORTISOL: CPT

## 2024-09-29 PROCEDURE — 84300 ASSAY OF URINE SODIUM: CPT

## 2024-09-29 PROCEDURE — 87186 SC STD MICRODIL/AGAR DIL: CPT

## 2024-09-29 PROCEDURE — 82553 CREATINE MB FRACTION: CPT

## 2024-09-29 PROCEDURE — 82962 GLUCOSE BLOOD TEST: CPT

## 2024-09-29 PROCEDURE — 87324 CLOSTRIDIUM AG IA: CPT

## 2024-09-29 PROCEDURE — 87641 MR-STAPH DNA AMP PROBE: CPT

## 2024-09-29 PROCEDURE — 80048 BASIC METABOLIC PNL TOTAL CA: CPT

## 2024-09-29 PROCEDURE — P9022: CPT

## 2024-09-29 PROCEDURE — 87637 SARSCOV2&INF A&B&RSV AMP PRB: CPT

## 2024-09-29 PROCEDURE — 83615 LACTATE (LD) (LDH) ENZYME: CPT

## 2024-09-29 PROCEDURE — 85220 BLOOC CLOT FACTOR V TEST: CPT

## 2024-09-29 PROCEDURE — 76376 3D RENDER W/INTRP POSTPROCES: CPT

## 2024-09-29 PROCEDURE — C1887: CPT

## 2024-09-29 PROCEDURE — 87086 URINE CULTURE/COLONY COUNT: CPT

## 2024-09-29 PROCEDURE — 76705 ECHO EXAM OF ABDOMEN: CPT

## 2024-09-29 PROCEDURE — 93970 EXTREMITY STUDY: CPT

## 2024-09-29 PROCEDURE — 87507 IADNA-DNA/RNA PROBE TQ 12-25: CPT

## 2024-09-29 PROCEDURE — 85027 COMPLETE CBC AUTOMATED: CPT

## 2024-09-29 PROCEDURE — 83540 ASSAY OF IRON: CPT

## 2024-09-29 PROCEDURE — 86900 BLOOD TYPING SEROLOGIC ABO: CPT

## 2024-09-29 PROCEDURE — 84484 ASSAY OF TROPONIN QUANT: CPT

## 2024-09-29 PROCEDURE — P9047: CPT

## 2024-09-29 PROCEDURE — 85018 HEMOGLOBIN: CPT

## 2024-09-29 PROCEDURE — 82040 ASSAY OF SERUM ALBUMIN: CPT

## 2024-09-29 PROCEDURE — 87077 CULTURE AEROBIC IDENTIFY: CPT

## 2024-09-29 PROCEDURE — 97110 THERAPEUTIC EXERCISES: CPT

## 2024-09-29 PROCEDURE — 93356 MYOCRD STRAIN IMG SPCKL TRCK: CPT

## 2024-09-29 PROCEDURE — 74230 X-RAY XM SWLNG FUNCJ C+: CPT

## 2024-09-29 PROCEDURE — 74174 CTA ABD&PLVS W/CONTRAST: CPT | Mod: MC

## 2024-09-29 PROCEDURE — 84295 ASSAY OF SERUM SODIUM: CPT

## 2024-09-29 PROCEDURE — 82248 BILIRUBIN DIRECT: CPT

## 2024-09-29 PROCEDURE — 76937 US GUIDE VASCULAR ACCESS: CPT

## 2024-09-29 PROCEDURE — 82550 ASSAY OF CK (CPK): CPT

## 2024-09-29 PROCEDURE — P9059: CPT

## 2024-09-29 PROCEDURE — 82947 ASSAY GLUCOSE BLOOD QUANT: CPT

## 2024-09-29 PROCEDURE — 82140 ASSAY OF AMMONIA: CPT

## 2024-09-29 PROCEDURE — 97167 OT EVAL HIGH COMPLEX 60 MIN: CPT

## 2024-09-29 PROCEDURE — 84100 ASSAY OF PHOSPHORUS: CPT

## 2024-09-29 PROCEDURE — 76700 US EXAM ABDOM COMPLETE: CPT

## 2024-09-29 PROCEDURE — 82941 ASSAY OF GASTRIN: CPT

## 2024-09-29 PROCEDURE — C1769: CPT

## 2024-09-29 PROCEDURE — 87449 NOS EACH ORGANISM AG IA: CPT

## 2024-09-29 PROCEDURE — 83010 ASSAY OF HAPTOGLOBIN QUANT: CPT

## 2024-09-29 PROCEDURE — 82728 ASSAY OF FERRITIN: CPT

## 2024-09-29 PROCEDURE — 84443 ASSAY THYROID STIM HORMONE: CPT

## 2024-09-29 PROCEDURE — 74177 CT ABD & PELVIS W/CONTRAST: CPT | Mod: MC

## 2024-09-29 PROCEDURE — P9037: CPT

## 2024-09-29 PROCEDURE — 97530 THERAPEUTIC ACTIVITIES: CPT

## 2024-09-29 PROCEDURE — 92610 EVALUATE SWALLOWING FUNCTION: CPT

## 2024-09-29 PROCEDURE — 86923 COMPATIBILITY TEST ELECTRIC: CPT

## 2024-09-29 PROCEDURE — 93306 TTE W/DOPPLER COMPLETE: CPT

## 2024-09-29 PROCEDURE — 82570 ASSAY OF URINE CREATININE: CPT

## 2024-09-29 PROCEDURE — 74018 RADEX ABDOMEN 1 VIEW: CPT

## 2024-09-29 PROCEDURE — 97161 PT EVAL LOW COMPLEX 20 MIN: CPT

## 2024-09-29 PROCEDURE — 36415 COLL VENOUS BLD VENIPUNCTURE: CPT

## 2024-09-29 PROCEDURE — 83036 HEMOGLOBIN GLYCOSYLATED A1C: CPT

## 2024-09-29 PROCEDURE — 87040 BLOOD CULTURE FOR BACTERIA: CPT

## 2024-09-29 PROCEDURE — 85240 CLOT FACTOR VIII AHG 1 STAGE: CPT

## 2024-09-29 PROCEDURE — 87338 HPYLORI STOOL AG IA: CPT

## 2024-09-29 PROCEDURE — 85045 AUTOMATED RETICULOCYTE COUNT: CPT

## 2024-09-29 PROCEDURE — P9016: CPT

## 2024-09-29 PROCEDURE — 87640 STAPH A DNA AMP PROBE: CPT

## 2024-09-29 PROCEDURE — 82272 OCCULT BLD FECES 1-3 TESTS: CPT

## 2024-09-29 PROCEDURE — 85396 CLOTTING ASSAY WHOLE BLOOD: CPT

## 2024-09-29 PROCEDURE — 85730 THROMBOPLASTIN TIME PARTIAL: CPT

## 2024-09-29 PROCEDURE — 82435 ASSAY OF BLOOD CHLORIDE: CPT

## 2024-09-29 PROCEDURE — 36430 TRANSFUSION BLD/BLD COMPNT: CPT

## 2024-09-29 PROCEDURE — 85610 PROTHROMBIN TIME: CPT

## 2024-09-29 PROCEDURE — 99285 EMERGENCY DEPT VISIT HI MDM: CPT | Mod: 25

## 2024-09-29 PROCEDURE — 86316 IMMUNOASSAY TUMOR OTHER: CPT

## 2024-09-29 PROCEDURE — 94640 AIRWAY INHALATION TREATMENT: CPT

## 2024-09-29 PROCEDURE — 82746 ASSAY OF FOLIC ACID SERUM: CPT

## 2024-10-16 NOTE — CHART NOTE - NSCHARTNOTEFT_GEN_A_CORE
MD notified pt tachy to 130s and febrile. MD examined pt bedside. Pt given albumin and fluid, GIven Tylenol IV. As per nurse, bowel movement looked like melena. Stat CBC, BMP, abg. New blood cultures sent. Sepsis criteria met MD notified pt tachy to 130s and febrile. MD examined pt bedside. Pt given albumin and fluid, GIven Tylenol IV. As per nurse, bowel movement looked like melena. Stat CBC, BMP, abg. New blood cultures sent. Sepsis criteria met. Pt started on oral and IV vanc [Follow-Up: _____] : a [unfilled] follow-up visit

## 2024-11-11 NOTE — H&P ADULT - PROBLEM/PLAN-1
Detail Level: Simple Price (Do Not Change): 0.00 Instructions: This plan will send the code FBSE to the PM system.  DO NOT or CHANGE the price. DISPLAY PLAN FREE TEXT

## 2024-11-12 NOTE — ASU PREOP CHECKLIST - TEMPERATURE IN FAHRENHEIT (DEGREES F)
Clinic hours for Dr. Mathur:  97.9

## 2024-12-02 NOTE — AIRWAY PLACEMENT NOTE ADULT - DATE /TIME:
"     12/1/24 patient and wife called in to report pt feeling more fatigued today, and increased leg swelling. Pt reports he ate \"thanksgiving food\" Thursday, Friday and Saturday, and has had a 3 lb increase in his weight since Friday. Reports he is wearing his compression stockings. Pt reports no acute SOB, no acute CP. Pt reports VSS. Pt reports he has not been taking his torsemide since last week. Reviewing Kathy Black MD and Dr Carbajal's notes there is a question of whether we are to be doing the torsemide daily or as needed. Pt reports he was told to hold it. Pt states he does not want to take the torsemide tonight, even if provider would like him to,  He is agreeable to checking back in in the am with updated VS and weight and if no improvement having us clarify with the provider for a plan.   "
09-Jul-2024 18:23
18-Jul-2024 12:01

## 2025-02-13 NOTE — DIETITIAN INITIAL EVALUATION ADULT - PERTINENT MEDS FT
denies pain/discomfort (Rating = 0)
MEDICATIONS  (STANDING):  allopurinol 100 milliGRAM(s) Oral two times a day  apixaban 5 milliGRAM(s) Oral every 12 hours  ascorbic acid 500 milliGRAM(s) Oral daily  atorvastatin 80 milliGRAM(s) Oral at bedtime  clopidogrel Tablet 75 milliGRAM(s) Oral daily  colchicine 0.6 milliGRAM(s) Oral daily  folic acid 1 milliGRAM(s) Oral daily  lactated ringers. 1000 milliLiter(s) (75 mL/Hr) IV Continuous <Continuous>  midodrine. 20 milliGRAM(s) Oral three times a day  multivitamin 1 Tablet(s) Oral daily  sodium chloride 0.9%. 1000 milliLiter(s) (50 mL/Hr) IV Continuous <Continuous>  sotalol. 40 milliGRAM(s) Oral every 24 hours    MEDICATIONS  (PRN):  acetaminophen     Tablet .. 650 milliGRAM(s) Oral every 6 hours PRN Temp greater or equal to 38C (100.4F), Mild Pain (1 - 3)  ondansetron Injectable 4 milliGRAM(s) IV Push every 8 hours PRN Nausea and/or Vomiting

## 2025-04-14 NOTE — PATIENT PROFILE ADULT. - PATIENT REPRESENTATIVE NAME
Care Transitions Initial Follow Up Call    Outreach made within 2 business days of discharge: Yes    Patient: Bassam Huerta Patient : 1957   MRN: [id number]  Reason for Admission: Scrotum edema  Discharge Date:         Spoke with: Pt     Discharge department/facility: [facility]     TCM Interactive Patient Contact:  Was patient able to fill all prescriptions: Yes  Was patient instructed to bring all medications to the follow-up visit: Yes  Is patient taking all medications as directed in the discharge summary? Yes  Does patient understand their discharge instructions: Yes  Does patient have questions or concerns that need addressed prior to 7-14 day follow up office visit: no    Additional needs identified to be addressed with provider                Scheduled appointment with PCP within 7-14 days    Follow Up  Future Appointments   Date Time Provider Department Center   2025 10:30 AM Callie Garrido APRN - CNP [facility]       Aleida Stiles MA     as above Yamilet Espinal

## 2025-05-07 NOTE — ASU PREOP CHECKLIST - TAMPON REMOVED
Sleep Apnea Dentists      [facility]  [address]  [place], [place] [address]  [phone]  [email or web address]      [facility]  [address]  [place] [address] [phone]       [facility] (only for Medicare insurance for now)  [address], [address]  [place], [place] [id number] [phone]      Ricky Herbert DDS  Orthodontics and Sleep [facility]  Office numbers:  [place] - [phone]  [place] - [phone]  [facility] - [phone]      [facility]  Dr. Gerardo Vogel, DMD  [address] [place], [place] [id number] [phone]      [facility]  [address] [place] [address]  [phone]      [facility] D[facility]  [address] [place] [address] [phone]      [facility]  [address]  [place], [place] [id number] [phone]      [facility]  [address] [place] [address] [phone]   
n/a
